# Patient Record
Sex: MALE | Race: WHITE | NOT HISPANIC OR LATINO | Employment: FULL TIME | URBAN - METROPOLITAN AREA
[De-identification: names, ages, dates, MRNs, and addresses within clinical notes are randomized per-mention and may not be internally consistent; named-entity substitution may affect disease eponyms.]

---

## 2017-06-14 ENCOUNTER — GENERIC CONVERSION - ENCOUNTER (OUTPATIENT)
Dept: OTHER | Facility: OTHER | Age: 57
End: 2017-06-14

## 2017-06-14 ENCOUNTER — TRANSCRIBE ORDERS (OUTPATIENT)
Dept: ADMINISTRATIVE | Facility: HOSPITAL | Age: 57
End: 2017-06-14

## 2017-06-14 ENCOUNTER — ALLSCRIPTS OFFICE VISIT (OUTPATIENT)
Dept: OTHER | Facility: OTHER | Age: 57
End: 2017-06-14

## 2017-06-14 DIAGNOSIS — E78.5 HYPERLIPIDEMIA: ICD-10-CM

## 2017-06-14 DIAGNOSIS — I31.9 DISEASE OF PERICARDIUM: ICD-10-CM

## 2017-06-14 DIAGNOSIS — I35.0 NODULAR CALCIFIC AORTIC VALVE STENOSIS: Primary | ICD-10-CM

## 2017-06-14 DIAGNOSIS — I35.0 NONRHEUMATIC AORTIC VALVE STENOSIS: ICD-10-CM

## 2017-06-14 DIAGNOSIS — I44.7 LEFT BUNDLE-BRANCH BLOCK: ICD-10-CM

## 2017-06-14 DIAGNOSIS — Z95.3 PRESENCE OF XENOGENIC HEART VALVE: ICD-10-CM

## 2017-06-14 DIAGNOSIS — I10 ESSENTIAL (PRIMARY) HYPERTENSION: ICD-10-CM

## 2017-06-21 ENCOUNTER — HOSPITAL ENCOUNTER (OUTPATIENT)
Dept: NON INVASIVE DIAGNOSTICS | Facility: HOSPITAL | Age: 57
Discharge: HOME | End: 2017-06-21
Attending: INTERNAL MEDICINE
Payer: COMMERCIAL

## 2017-06-21 DIAGNOSIS — I35.0 NODULAR CALCIFIC AORTIC VALVE STENOSIS: ICD-10-CM

## 2017-06-21 PROCEDURE — 93306 TTE W/DOPPLER COMPLETE: CPT

## 2017-06-27 ENCOUNTER — TRANSCRIBE ORDERS (OUTPATIENT)
Dept: LAB | Facility: MEDICAL CENTER | Age: 57
End: 2017-06-27

## 2017-06-27 ENCOUNTER — APPOINTMENT (OUTPATIENT)
Dept: LAB | Facility: MEDICAL CENTER | Age: 57
End: 2017-06-27
Payer: COMMERCIAL

## 2017-06-27 DIAGNOSIS — I35.0 NODULAR CALCIFIC AORTIC VALVE STENOSIS: ICD-10-CM

## 2017-06-27 DIAGNOSIS — I35.0 NODULAR CALCIFIC AORTIC VALVE STENOSIS: Primary | ICD-10-CM

## 2017-06-27 DIAGNOSIS — E78.5 OTHER AND UNSPECIFIED HYPERLIPIDEMIA: ICD-10-CM

## 2017-06-27 DIAGNOSIS — I44.7 OTHER LEFT BUNDLE BRANCH BLOCK: ICD-10-CM

## 2017-06-27 DIAGNOSIS — Z95.3 STATUS POST AORTIC VALVE REPLACEMENT WITH TISSUE: ICD-10-CM

## 2017-06-27 DIAGNOSIS — I31.9 UNSPECIFIED DISEASE OF PERICARDIUM: ICD-10-CM

## 2017-06-27 DIAGNOSIS — I10 UNSPECIFIED ESSENTIAL HYPERTENSION: ICD-10-CM

## 2017-06-27 PROCEDURE — 85027 COMPLETE CBC AUTOMATED: CPT | Performed by: INTERNAL MEDICINE

## 2017-06-27 PROCEDURE — 36415 COLL VENOUS BLD VENIPUNCTURE: CPT | Performed by: INTERNAL MEDICINE

## 2017-06-27 PROCEDURE — 80061 LIPID PANEL: CPT | Performed by: INTERNAL MEDICINE

## 2017-06-27 PROCEDURE — 80053 COMPREHEN METABOLIC PANEL: CPT | Performed by: INTERNAL MEDICINE

## 2017-07-05 ENCOUNTER — GENERIC CONVERSION - ENCOUNTER (OUTPATIENT)
Dept: OTHER | Facility: OTHER | Age: 57
End: 2017-07-05

## 2017-07-17 ENCOUNTER — GENERIC CONVERSION - ENCOUNTER (OUTPATIENT)
Dept: OTHER | Facility: OTHER | Age: 57
End: 2017-07-17

## 2017-07-24 ENCOUNTER — ALLSCRIPTS OFFICE VISIT (OUTPATIENT)
Dept: OTHER | Facility: OTHER | Age: 57
End: 2017-07-24

## 2017-09-14 ENCOUNTER — ALLSCRIPTS OFFICE VISIT (OUTPATIENT)
Dept: OTHER | Facility: OTHER | Age: 57
End: 2017-09-14

## 2017-12-20 ENCOUNTER — GENERIC CONVERSION - ENCOUNTER (OUTPATIENT)
Dept: OTHER | Facility: OTHER | Age: 57
End: 2017-12-20

## 2017-12-20 ENCOUNTER — ALLSCRIPTS OFFICE VISIT (OUTPATIENT)
Dept: OTHER | Facility: OTHER | Age: 57
End: 2017-12-20

## 2018-01-12 VITALS
HEART RATE: 83 BPM | WEIGHT: 309 LBS | BODY MASS INDEX: 40.95 KG/M2 | DIASTOLIC BLOOD PRESSURE: 71 MMHG | SYSTOLIC BLOOD PRESSURE: 133 MMHG | HEIGHT: 73 IN

## 2018-01-14 VITALS
WEIGHT: 307 LBS | SYSTOLIC BLOOD PRESSURE: 142 MMHG | BODY MASS INDEX: 40.69 KG/M2 | HEIGHT: 73 IN | DIASTOLIC BLOOD PRESSURE: 80 MMHG

## 2018-01-15 VITALS
SYSTOLIC BLOOD PRESSURE: 130 MMHG | WEIGHT: 218.38 LBS | HEIGHT: 73 IN | DIASTOLIC BLOOD PRESSURE: 68 MMHG | BODY MASS INDEX: 28.94 KG/M2

## 2018-01-22 VITALS
SYSTOLIC BLOOD PRESSURE: 158 MMHG | HEIGHT: 73 IN | BODY MASS INDEX: 41.75 KG/M2 | WEIGHT: 315 LBS | DIASTOLIC BLOOD PRESSURE: 78 MMHG | HEART RATE: 85 BPM

## 2018-02-14 DIAGNOSIS — I10 HYPERTENSION, UNSPECIFIED TYPE: Primary | ICD-10-CM

## 2018-02-14 DIAGNOSIS — E87.6 HYPOKALEMIA: ICD-10-CM

## 2018-02-14 DIAGNOSIS — E78.5 HYPERLIPIDEMIA, UNSPECIFIED HYPERLIPIDEMIA TYPE: ICD-10-CM

## 2018-02-14 RX ORDER — TORSEMIDE 20 MG/1
1 TABLET ORAL DAILY
COMMUNITY
End: 2018-02-14 | Stop reason: SDUPTHER

## 2018-02-14 RX ORDER — TORSEMIDE 20 MG/1
20 TABLET ORAL DAILY
Qty: 30 TABLET | Refills: 5 | Status: SHIPPED | OUTPATIENT
Start: 2018-02-14 | End: 2018-06-21 | Stop reason: DRUGHIGH

## 2018-02-14 RX ORDER — POTASSIUM CHLORIDE 20 MEQ/1
20 TABLET, EXTENDED RELEASE ORAL DAILY
Qty: 30 TABLET | Refills: 5 | Status: SHIPPED | OUTPATIENT
Start: 2018-02-14 | End: 2018-11-16 | Stop reason: SDUPTHER

## 2018-02-14 RX ORDER — AMLODIPINE BESYLATE 5 MG/1
5 TABLET ORAL DAILY
Refills: 11 | COMMUNITY
Start: 2018-02-01 | End: 2018-02-14 | Stop reason: SDUPTHER

## 2018-02-14 RX ORDER — METOPROLOL TARTRATE 100 MG/1
1 TABLET ORAL 2 TIMES DAILY
COMMUNITY
Start: 2014-08-04 | End: 2018-02-14 | Stop reason: SDUPTHER

## 2018-02-14 RX ORDER — METOPROLOL TARTRATE 100 MG/1
100 TABLET ORAL 2 TIMES DAILY
Qty: 60 TABLET | Refills: 5 | Status: SHIPPED | OUTPATIENT
Start: 2018-02-14 | End: 2018-10-09 | Stop reason: SDUPTHER

## 2018-02-14 RX ORDER — PRAVASTATIN SODIUM 40 MG
40 TABLET ORAL DAILY
Refills: 6 | COMMUNITY
Start: 2018-02-01 | End: 2018-02-14 | Stop reason: SDUPTHER

## 2018-02-14 RX ORDER — AMLODIPINE BESYLATE 5 MG/1
5 TABLET ORAL DAILY
Qty: 30 TABLET | Refills: 5 | Status: SHIPPED | OUTPATIENT
Start: 2018-02-14 | End: 2018-11-16 | Stop reason: SDUPTHER

## 2018-02-14 RX ORDER — POTASSIUM CHLORIDE 20 MEQ/1
1 TABLET, EXTENDED RELEASE ORAL DAILY
COMMUNITY
Start: 2014-08-06 | End: 2018-02-14 | Stop reason: SDUPTHER

## 2018-02-14 RX ORDER — PRAVASTATIN SODIUM 40 MG
40 TABLET ORAL DAILY
Qty: 30 TABLET | Refills: 5 | Status: SHIPPED | OUTPATIENT
Start: 2018-02-14 | End: 2018-11-16 | Stop reason: SDUPTHER

## 2018-04-06 RX ORDER — ASCORBIC ACID 500 MG
1 TABLET ORAL 2 TIMES DAILY
COMMUNITY
End: 2019-03-16 | Stop reason: HOSPADM

## 2018-04-06 RX ORDER — ASPIRIN 325 MG
1 TABLET ORAL DAILY
COMMUNITY
Start: 2014-08-04 | End: 2018-11-16 | Stop reason: SDUPTHER

## 2018-04-06 RX ORDER — FLUTICASONE PROPIONATE 50 MCG
1 SPRAY, SUSPENSION (ML) NASAL 2 TIMES DAILY
COMMUNITY
Start: 2015-02-19 | End: 2019-03-16 | Stop reason: HOSPADM

## 2018-04-06 RX ORDER — LORATADINE 10 MG/1
1 TABLET ORAL
COMMUNITY
End: 2019-03-16 | Stop reason: HOSPADM

## 2018-04-11 ENCOUNTER — OFFICE VISIT (OUTPATIENT)
Dept: CARDIOLOGY CLINIC | Facility: HOSPITAL | Age: 58
End: 2018-04-11
Payer: COMMERCIAL

## 2018-04-11 VITALS
DIASTOLIC BLOOD PRESSURE: 82 MMHG | WEIGHT: 315 LBS | SYSTOLIC BLOOD PRESSURE: 136 MMHG | BODY MASS INDEX: 41.75 KG/M2 | HEART RATE: 72 BPM | HEIGHT: 73 IN

## 2018-04-11 DIAGNOSIS — I10 ESSENTIAL HYPERTENSION: ICD-10-CM

## 2018-04-11 DIAGNOSIS — I35.0 AORTIC STENOSIS, MILD: Primary | ICD-10-CM

## 2018-04-11 DIAGNOSIS — I44.7 LEFT BUNDLE BRANCH BLOCK: ICD-10-CM

## 2018-04-11 DIAGNOSIS — E78.2 MIXED HYPERLIPIDEMIA: ICD-10-CM

## 2018-04-11 PROCEDURE — 99214 OFFICE O/P EST MOD 30 MIN: CPT | Performed by: INTERNAL MEDICINE

## 2018-04-11 NOTE — PROGRESS NOTES
Cardiology Follow Up    Nona Beckman  1960  [id number]  [address] [address]    1  Aortic stenosis, mild  Echo complete with contrast if indicated    CBC and Platelet    Comprehensive metabolic panel    Lipid panel   2  Essential hypertension  CBC and Platelet    Comprehensive metabolic panel    Lipid panel   3  Left bundle branch block     4  Mixed hyperlipidemia  CBC and Platelet    Comprehensive metabolic panel    Lipid panel       Discussion/Summary:  Overall he is doing well from a cardiac standpoint  He is due for a yearly echocardiogram to follow mean gradients across his bioprosthetic AVR  Gradients have been rising slowly over time  Blood pressure is well controlled  He is due for a fasting lipid profile to assess the adequacy of his statin dose  He does have some mild edema on exam I have asked him to take an extra torsemide 1 day a week  Interval History:  Routine follow-up visit  Denies any chest pain, shortness of breath, palpitations, lightheadedness, dizziness, or syncope  Functional capacity is good he is still working 5 days a week at [employer]  Needs yearly echoes to follow elevated mean gradient on bioprosthetic valve  Denies PND orthopnea  He has had some mild lower extremity edema  Dietary intake of salt has been slightly increased  Problem List     Aortic stenosis, mild    Overview Signed 4/11/2018  7:59 AM by Rafa Coley DO     Description: Severe  ECHO DONE 6-2014  SEVERE AS  PEAK GRADIENT-65mmHg  mean gradient was 38mmHg  MARY-0 7cm2  mild AI           Essential hypertension    Hyperlipidemia    Left bundle branch block        Past Medical History:   Diagnosis Date    Hyperlipidemia     Hypertension      Social History     Social History    Marital status: /Civil Union     Spouse name: N/A    Number of children: N/A    Years of education: N/A     Occupational History    Not on file  Social History Main Topics    Smoking status: Never Smoker    Smokeless tobacco: Never Used    Alcohol use Yes      Comment: ocassion    Drug use: No    Sexual activity: Not on file     Other Topics Concern    Not on file     Social History Narrative    No narrative on file      No family history on file  No past surgical history on file      Current Outpatient Prescriptions:     amLODIPine (NORVASC) 5 mg tablet, Take 1 tablet (5 mg total) by mouth daily, Disp: 30 tablet, Rfl: 5    ascorbic acid (VITAMIN C) 500 MG tablet, Take 1 tablet by mouth 2 (two) times a day, Disp: , Rfl:     aspirin 325 mg tablet, Take 1 tablet by mouth daily, Disp: , Rfl:     metoprolol tartrate (LOPRESSOR) 100 mg tablet, Take 1 tablet (100 mg total) by mouth 2 (two) times a day, Disp: 60 tablet, Rfl: 5    potassium chloride (K-DUR,KLOR-CON) 20 mEq tablet, Take 1 tablet (20 mEq total) by mouth daily, Disp: 30 tablet, Rfl: 5    pravastatin (PRAVACHOL) 40 mg tablet, Take 1 tablet (40 mg total) by mouth daily, Disp: 30 tablet, Rfl: 5    torsemide (DEMADEX) 20 mg tablet, Take 1 tablet (20 mg total) by mouth daily, Disp: 30 tablet, Rfl: 5    fluticasone (FLONASE) 50 mcg/act nasal spray, 1 spray into each nostril 2 (two) times a day, Disp: , Rfl:     loratadine (CLARITIN) 10 mg tablet, Take 1 tablet by mouth, Disp: , Rfl:   Allergies   Allergen Reactions    Penicillins Rash       Labs:     Chemistry        Component Value Date/Time     06/27/2017 0920     04/09/2015 1141    K 4 6 06/27/2017 0920    K 4 3 04/09/2015 1141     06/27/2017 0920     04/09/2015 1141    CO2 29 06/27/2017 0920    CO2 27 3 04/09/2015 1141    BUN 16 06/27/2017 0920    BUN 17 04/09/2015 1141    CREATININE 0 69 06/27/2017 0920    CREATININE 0 67 04/09/2015 1141        Component Value Date/Time    CALCIUM 8 8 06/27/2017 0920    CALCIUM 8 4 04/09/2015 1141    ALKPHOS 86 06/27/2017 0920    ALKPHOS 104 10/30/2014 1600    AST 26 06/27/2017 0920    AST 15 10/30/2014 1600    ALT 50 06/27/2017 0920    ALT 22 10/30/2014 1600    BILITOT 0 46 06/27/2017 0920    BILITOT 0 4 10/30/2014 1600            Lab Results   Component Value Date    CHOL 195 06/27/2017    CHOL 146 07/18/2014    CHOL 145 02/21/2014     Lab Results   Component Value Date    HDL 52 06/27/2017    HDL 51 07/18/2014    HDL 42 02/21/2014     Lab Results   Component Value Date    LDLCALC 129 (H) 06/27/2017    LDLCALC 81 07/18/2014    LDLCALC 85 02/21/2014     Lab Results   Component Value Date    TRIG 71 06/27/2017    TRIG 70 07/18/2014    TRIG 91 02/21/2014     No components found for: CHOLHDL    Imaging: No results found  ECG:        Review of Systems   Constitution: Negative  HENT: Negative  Eyes: Negative  Cardiovascular: Positive for leg swelling  Respiratory: Negative  Endocrine: Negative  Hematologic/Lymphatic: Negative  Skin: Negative  Musculoskeletal: Negative  Gastrointestinal: Negative  Genitourinary: Negative  Neurological: Negative  Psychiatric/Behavioral: Negative  Vitals:    04/11/18 1328   BP: 136/82   Pulse: 72     Vitals:    04/11/18 1328   Weight: (!) 155 kg (341 lb)     Height: 6' 1" (185 4 cm)   Body mass index is 44 99 kg/m²  Physical Exam:  Vital signs reviewed    General appearance:  Appears stated age, alert, well appearing and in no distress  HEENT:  PERRLA, EOMI, no scleral icterus, no conjunctival pallor  NECK:  Supple, No elevated JVP, no thyromegaly, no carotid bruits  HEART:  Regular rate and rhythm, normal S1/S2, no S3/S4, no murmur or rub  LUNGS:  Clear to auscultation bilaterally, no wheezes rales or rhonchi  ABDOMEN:  Soft, non-tender, positive bowel sounds, no rebound or guarding, no organomegaly   EXTREMITIES:  +1 edema, normal range of motion  VASCULAR:  Normal pedal pulses, good pulse volume   SKIN: No lesions or rashes on exposed skin  NEURO:  CN II-XII intact, no focal deficits

## 2018-05-02 ENCOUNTER — CLINICAL SUPPORT (OUTPATIENT)
Dept: FAMILY MEDICINE CLINIC | Facility: CLINIC | Age: 58
End: 2018-05-02
Payer: COMMERCIAL

## 2018-05-02 DIAGNOSIS — Z23 NEED FOR IMMUNIZATION AGAINST TYPHOID: Primary | ICD-10-CM

## 2018-05-02 PROCEDURE — 90471 IMMUNIZATION ADMIN: CPT | Performed by: FAMILY MEDICINE

## 2018-05-02 PROCEDURE — 90691 TYPHOID VACCINE IM: CPT

## 2018-06-02 ENCOUNTER — TRANSCRIBE ORDERS (OUTPATIENT)
Dept: LAB | Facility: MEDICAL CENTER | Age: 58
End: 2018-06-02

## 2018-06-02 ENCOUNTER — APPOINTMENT (OUTPATIENT)
Dept: LAB | Facility: MEDICAL CENTER | Age: 58
End: 2018-06-02
Payer: COMMERCIAL

## 2018-06-02 DIAGNOSIS — E78.5 HYPERLIPIDEMIA, UNSPECIFIED HYPERLIPIDEMIA TYPE: ICD-10-CM

## 2018-06-02 DIAGNOSIS — E78.2 MIXED HYPERLIPIDEMIA: ICD-10-CM

## 2018-06-02 DIAGNOSIS — E78.5 HYPERLIPIDEMIA, UNSPECIFIED HYPERLIPIDEMIA TYPE: Primary | ICD-10-CM

## 2018-06-02 LAB
ALBUMIN SERPL BCP-MCNC: 3.6 G/DL (ref 3.5–5)
ALP SERPL-CCNC: 90 U/L (ref 46–116)
ALT SERPL W P-5'-P-CCNC: 25 U/L (ref 12–78)
ANION GAP SERPL CALCULATED.3IONS-SCNC: 6 MMOL/L (ref 4–13)
AST SERPL W P-5'-P-CCNC: 12 U/L (ref 5–45)
BASOPHILS # BLD AUTO: 0.08 THOUSANDS/ΜL (ref 0–0.1)
BASOPHILS NFR BLD AUTO: 2 % (ref 0–1)
BILIRUB SERPL-MCNC: 0.41 MG/DL (ref 0.2–1)
BUN SERPL-MCNC: 20 MG/DL (ref 5–25)
CALCIUM SERPL-MCNC: 8.4 MG/DL (ref 8.3–10.1)
CHLORIDE SERPL-SCNC: 105 MMOL/L (ref 100–108)
CHOLEST SERPL-MCNC: 118 MG/DL (ref 50–200)
CO2 SERPL-SCNC: 27 MMOL/L (ref 21–32)
CREAT SERPL-MCNC: 0.75 MG/DL (ref 0.6–1.3)
EOSINOPHIL # BLD AUTO: 0.15 THOUSAND/ΜL (ref 0–0.61)
EOSINOPHIL NFR BLD AUTO: 3 % (ref 0–6)
ERYTHROCYTE [DISTWIDTH] IN BLOOD BY AUTOMATED COUNT: 16.3 % (ref 11.6–15.1)
GFR SERPL CREATININE-BSD FRML MDRD: 100 ML/MIN/1.73SQ M
GLUCOSE P FAST SERPL-MCNC: 102 MG/DL (ref 65–99)
HCT VFR BLD AUTO: 42.5 % (ref 36.5–49.3)
HDLC SERPL-MCNC: 41 MG/DL (ref 40–60)
HGB BLD-MCNC: 12.9 G/DL (ref 12–17)
IMM GRANULOCYTES # BLD AUTO: 0.03 THOUSAND/UL (ref 0–0.2)
IMM GRANULOCYTES NFR BLD AUTO: 1 % (ref 0–2)
LDLC SERPL CALC-MCNC: 57 MG/DL (ref 0–100)
LYMPHOCYTES # BLD AUTO: 1.05 THOUSANDS/ΜL (ref 0.6–4.47)
LYMPHOCYTES NFR BLD AUTO: 20 % (ref 14–44)
MCH RBC QN AUTO: 25.7 PG (ref 26.8–34.3)
MCHC RBC AUTO-ENTMCNC: 30.4 G/DL (ref 31.4–37.4)
MCV RBC AUTO: 85 FL (ref 82–98)
MONOCYTES # BLD AUTO: 0.54 THOUSAND/ΜL (ref 0.17–1.22)
MONOCYTES NFR BLD AUTO: 10 % (ref 4–12)
NEUTROPHILS # BLD AUTO: 3.39 THOUSANDS/ΜL (ref 1.85–7.62)
NEUTS SEG NFR BLD AUTO: 64 % (ref 43–75)
NONHDLC SERPL-MCNC: 77 MG/DL
NRBC BLD AUTO-RTO: 0 /100 WBCS
PLATELET # BLD AUTO: 187 THOUSANDS/UL (ref 149–390)
PMV BLD AUTO: 11.9 FL (ref 8.9–12.7)
POTASSIUM SERPL-SCNC: 4.2 MMOL/L (ref 3.5–5.3)
PROT SERPL-MCNC: 7 G/DL (ref 6.4–8.2)
RBC # BLD AUTO: 5.01 MILLION/UL (ref 3.88–5.62)
SODIUM SERPL-SCNC: 138 MMOL/L (ref 136–145)
TRIGL SERPL-MCNC: 102 MG/DL
WBC # BLD AUTO: 5.24 THOUSAND/UL (ref 4.31–10.16)

## 2018-06-02 PROCEDURE — 80061 LIPID PANEL: CPT

## 2018-06-02 PROCEDURE — 85025 COMPLETE CBC W/AUTO DIFF WBC: CPT

## 2018-06-02 PROCEDURE — 80053 COMPREHEN METABOLIC PANEL: CPT

## 2018-06-02 PROCEDURE — 36415 COLL VENOUS BLD VENIPUNCTURE: CPT

## 2018-06-07 ENCOUNTER — HOSPITAL ENCOUNTER (OUTPATIENT)
Dept: NON INVASIVE DIAGNOSTICS | Facility: HOSPITAL | Age: 58
Discharge: HOME | End: 2018-06-07
Attending: INTERNAL MEDICINE
Payer: COMMERCIAL

## 2018-06-07 DIAGNOSIS — I35.0 AORTIC STENOSIS, MILD: ICD-10-CM

## 2018-06-07 PROCEDURE — 93306 TTE W/DOPPLER COMPLETE: CPT

## 2018-06-07 PROCEDURE — 93306 TTE W/DOPPLER COMPLETE: CPT | Performed by: INTERNAL MEDICINE

## 2018-06-11 ENCOUNTER — OFFICE VISIT (OUTPATIENT)
Dept: FAMILY MEDICINE CLINIC | Facility: CLINIC | Age: 58
End: 2018-06-11
Payer: COMMERCIAL

## 2018-06-11 ENCOUNTER — TELEPHONE (OUTPATIENT)
Dept: FAMILY MEDICINE CLINIC | Facility: CLINIC | Age: 58
End: 2018-06-11

## 2018-06-11 ENCOUNTER — HOSPITAL ENCOUNTER (OUTPATIENT)
Dept: ULTRASOUND IMAGING | Facility: HOSPITAL | Age: 58
Discharge: HOME | End: 2018-06-11
Payer: COMMERCIAL

## 2018-06-11 VITALS
SYSTOLIC BLOOD PRESSURE: 140 MMHG | WEIGHT: 315 LBS | DIASTOLIC BLOOD PRESSURE: 80 MMHG | BODY MASS INDEX: 41.75 KG/M2 | HEIGHT: 73 IN

## 2018-06-11 DIAGNOSIS — I87.2 VENOUS INSUFFICIENCY: ICD-10-CM

## 2018-06-11 DIAGNOSIS — M79.604 BILATERAL LEG PAIN: ICD-10-CM

## 2018-06-11 DIAGNOSIS — R60.0 BILATERAL LEG EDEMA: ICD-10-CM

## 2018-06-11 DIAGNOSIS — M79.605 BILATERAL LEG PAIN: ICD-10-CM

## 2018-06-11 DIAGNOSIS — R60.0 BILATERAL LEG EDEMA: Primary | ICD-10-CM

## 2018-06-11 PROBLEM — R63.8 INCREASED BMI: Status: ACTIVE | Noted: 2017-09-14

## 2018-06-11 PROBLEM — H25.9 AGE-RELATED CATARACT OF RIGHT EYE: Status: ACTIVE | Noted: 2017-09-14

## 2018-06-11 PROCEDURE — 1036F TOBACCO NON-USER: CPT | Performed by: PHYSICIAN ASSISTANT

## 2018-06-11 PROCEDURE — 93970 EXTREMITY STUDY: CPT

## 2018-06-11 PROCEDURE — 93970 EXTREMITY STUDY: CPT | Performed by: SURGERY

## 2018-06-11 PROCEDURE — 3008F BODY MASS INDEX DOCD: CPT | Performed by: PHYSICIAN ASSISTANT

## 2018-06-11 PROCEDURE — 99214 OFFICE O/P EST MOD 30 MIN: CPT | Performed by: PHYSICIAN ASSISTANT

## 2018-06-11 NOTE — PROGRESS NOTES
Assessment/Plan:    Will send Michel for stat bilateral venous dopplers today at his request  If negative, would consider compression hose  Diagnoses and all orders for this visit:    Bilateral leg edema  -     VAS lower limb venous duplex study, complete bilateral; Future    Bilateral leg pain  -     VAS lower limb venous duplex study, complete bilateral; Future    Venous insufficiency  -     VAS lower limb venous duplex study, complete bilateral; Future          Subjective:      Patient ID: Roxie Montaño is a 60 y o  male  Fletcher Wong is here today complaining of pain and swelling in bilateral legs  He is a poor historian  He tells me pain in his ankles started 20+ years ago after a fall  Recently, however, legs have become more swollen and "hard " He is requesting ultrasounds on his legs today as he states he is worried he may have DVTs  He has a history of venous insufficiency and is morbidly obese  The following portions of the patient's history were reviewed and updated as appropriate:   He  has a past medical history of Allergic rhinitis; Finger fracture, right; Hemorrhoids; Hyperlipidemia; and Hypertension  He   Patient Active Problem List    Diagnosis Date Noted    Bilateral leg edema 06/11/2018    Bilateral leg pain 06/11/2018    Increased BMI 09/14/2017    Age-related cataract of right eye 09/14/2017    Hypokalemia 03/04/2016    Pericardial disease 11/03/2014    Left bundle branch block 08/20/2014    Venous insufficiency 08/11/2014    Sciatica 02/10/2014    Osteoarthritis of both knees 01/07/2014    Aortic stenosis, mild 12/17/2013    Murmur 11/26/2013    Essential hypertension 06/28/2012    Hyperlipidemia 06/28/2012     He  has a past surgical history that includes Aortic valve replacement (07/30/2014); Cardiac catheterization (07/18/2014); Knee cartilage surgery (Left); Tooth extraction; Testicle surgery; and Tonsillectomy and adenoidectomy    His family history includes Cancer in his family; Coronary artery disease in his father; Heart attack in his father; Heart disease in his mother; Hypertension in his father; Lung cancer in his mother  He  reports that he has never smoked  He has never used smokeless tobacco  He reports that he drinks alcohol  He reports that he does not use drugs  Current Outpatient Prescriptions   Medication Sig Dispense Refill    amLODIPine (NORVASC) 5 mg tablet Take 1 tablet (5 mg total) by mouth daily 30 tablet 5    ascorbic acid (VITAMIN C) 500 MG tablet Take 1 tablet by mouth 2 (two) times a day      aspirin 325 mg tablet Take 1 tablet by mouth daily      fluticasone (FLONASE) 50 mcg/act nasal spray 1 spray into each nostril 2 (two) times a day      loratadine (CLARITIN) 10 mg tablet Take 1 tablet by mouth      metoprolol tartrate (LOPRESSOR) 100 mg tablet Take 1 tablet (100 mg total) by mouth 2 (two) times a day 60 tablet 5    potassium chloride (K-DUR,KLOR-CON) 20 mEq tablet Take 1 tablet (20 mEq total) by mouth daily 30 tablet 5    pravastatin (PRAVACHOL) 40 mg tablet Take 1 tablet (40 mg total) by mouth daily 30 tablet 5    torsemide (DEMADEX) 20 mg tablet Take 1 tablet (20 mg total) by mouth daily 30 tablet 5     No current facility-administered medications for this visit        Current Outpatient Prescriptions on File Prior to Visit   Medication Sig    amLODIPine (NORVASC) 5 mg tablet Take 1 tablet (5 mg total) by mouth daily    ascorbic acid (VITAMIN C) 500 MG tablet Take 1 tablet by mouth 2 (two) times a day    aspirin 325 mg tablet Take 1 tablet by mouth daily    fluticasone (FLONASE) 50 mcg/act nasal spray 1 spray into each nostril 2 (two) times a day    loratadine (CLARITIN) 10 mg tablet Take 1 tablet by mouth    metoprolol tartrate (LOPRESSOR) 100 mg tablet Take 1 tablet (100 mg total) by mouth 2 (two) times a day    potassium chloride (K-DUR,KLOR-CON) 20 mEq tablet Take 1 tablet (20 mEq total) by mouth daily    pravastatin (PRAVACHOL) 40 mg tablet Take 1 tablet (40 mg total) by mouth daily    torsemide (DEMADEX) 20 mg tablet Take 1 tablet (20 mg total) by mouth daily     No current facility-administered medications on file prior to visit  He is allergic to penicillins       Review of Systems   Constitutional: Negative for chills and fever  HENT: Negative for congestion, rhinorrhea, sinus pain, sinus pressure and sore throat  Respiratory: Positive for shortness of breath (baseline)  Negative for cough, chest tightness and wheezing  Cardiovascular: Positive for leg swelling  Negative for chest pain and palpitations  Gastrointestinal: Negative for abdominal pain, constipation, diarrhea, nausea and vomiting  Musculoskeletal: Positive for gait problem  Skin: Negative for rash  Objective:      /80 (BP Location: Left arm, Patient Position: Sitting)   Ht 6' 1" (1 854 m)   Wt (!) 155 kg (341 lb)   BMI 44 99 kg/m²          Physical Exam   Constitutional: He is oriented to person, place, and time  He appears well-developed and well-nourished  No distress  HENT:   Head: Normocephalic and atraumatic  Right Ear: Hearing, tympanic membrane, external ear and ear canal normal    Left Ear: Hearing, tympanic membrane, external ear and ear canal normal    Mouth/Throat: Uvula is midline, oropharynx is clear and moist and mucous membranes are normal    Eyes: Right eye exhibits no discharge  Left eye exhibits no discharge  No scleral icterus  Cardiovascular: Normal rate, regular rhythm and normal heart sounds  Pulmonary/Chest: Effort normal and breath sounds normal    Abdominal:   Morbidly obese   Musculoskeletal: He exhibits edema  biltateral LE ++edema, pain, skin pink, no increased warmth  Bilateral LE are TTP, negative Lars's test bilaterally  Neurological: He is alert and oriented to person, place, and time  Skin: Skin is warm and dry  He is not diaphoretic  No erythema     Psychiatric: He has a normal mood and affect  His behavior is normal  Judgment and thought content normal    Vitals reviewed

## 2018-06-21 ENCOUNTER — TRANSCRIBE ORDERS (OUTPATIENT)
Dept: LAB | Facility: MEDICAL CENTER | Age: 58
End: 2018-06-21

## 2018-06-21 ENCOUNTER — APPOINTMENT (OUTPATIENT)
Dept: LAB | Facility: MEDICAL CENTER | Age: 58
End: 2018-06-21
Payer: COMMERCIAL

## 2018-06-21 DIAGNOSIS — R60.0 LOCALIZED EDEMA: Primary | ICD-10-CM

## 2018-06-21 DIAGNOSIS — E87.6 HYPOKALEMIA: ICD-10-CM

## 2018-06-21 DIAGNOSIS — E87.6 HYPOKALEMIA: Primary | ICD-10-CM

## 2018-06-21 LAB
ANION GAP SERPL CALCULATED.3IONS-SCNC: 6 MMOL/L (ref 4–13)
BUN SERPL-MCNC: 25 MG/DL (ref 5–25)
CALCIUM SERPL-MCNC: 8.7 MG/DL (ref 8.3–10.1)
CHLORIDE SERPL-SCNC: 104 MMOL/L (ref 100–108)
CO2 SERPL-SCNC: 29 MMOL/L (ref 21–32)
CREAT SERPL-MCNC: 0.98 MG/DL (ref 0.6–1.3)
GFR SERPL CREATININE-BSD FRML MDRD: 84 ML/MIN/1.73SQ M
GLUCOSE SERPL-MCNC: 84 MG/DL (ref 65–140)
POTASSIUM SERPL-SCNC: 4 MMOL/L (ref 3.5–5.3)
SODIUM SERPL-SCNC: 139 MMOL/L (ref 136–145)

## 2018-06-21 PROCEDURE — 80048 BASIC METABOLIC PNL TOTAL CA: CPT

## 2018-06-21 PROCEDURE — 36415 COLL VENOUS BLD VENIPUNCTURE: CPT

## 2018-06-22 RX ORDER — TORSEMIDE 20 MG/1
20 TABLET ORAL DAILY
Qty: 30 TABLET | Refills: 3 | Status: SHIPPED | OUTPATIENT
Start: 2018-06-22 | End: 2018-09-09 | Stop reason: SDUPTHER

## 2018-09-09 DIAGNOSIS — R60.0 LOCALIZED EDEMA: ICD-10-CM

## 2018-09-10 RX ORDER — TORSEMIDE 20 MG/1
TABLET ORAL
Qty: 90 TABLET | Refills: 2 | Status: SHIPPED | OUTPATIENT
Start: 2018-09-10 | End: 2018-11-16 | Stop reason: SDUPTHER

## 2018-10-09 DIAGNOSIS — I10 HYPERTENSION, UNSPECIFIED TYPE: ICD-10-CM

## 2018-10-09 RX ORDER — METOPROLOL TARTRATE 100 MG/1
100 TABLET ORAL 2 TIMES DAILY
Qty: 60 TABLET | Refills: 5 | Status: SHIPPED | OUTPATIENT
Start: 2018-10-09 | End: 2018-11-16 | Stop reason: SDUPTHER

## 2018-11-16 DIAGNOSIS — E87.6 HYPOKALEMIA: ICD-10-CM

## 2018-11-16 DIAGNOSIS — I10 HYPERTENSION, UNSPECIFIED TYPE: ICD-10-CM

## 2018-11-16 DIAGNOSIS — R60.0 LOCALIZED EDEMA: ICD-10-CM

## 2018-11-16 DIAGNOSIS — E78.5 HYPERLIPIDEMIA, UNSPECIFIED HYPERLIPIDEMIA TYPE: ICD-10-CM

## 2018-11-16 DIAGNOSIS — I35.0 AORTIC VALVE STENOSIS, ETIOLOGY OF CARDIAC VALVE DISEASE UNSPECIFIED: Primary | ICD-10-CM

## 2018-11-16 RX ORDER — TORSEMIDE 20 MG/1
20 TABLET ORAL DAILY
Qty: 30 TABLET | Refills: 11 | Status: SHIPPED | OUTPATIENT
Start: 2018-11-16 | End: 2019-03-16 | Stop reason: HOSPADM

## 2018-11-16 RX ORDER — METOPROLOL TARTRATE 100 MG/1
100 TABLET ORAL 2 TIMES DAILY
Qty: 60 TABLET | Refills: 11 | Status: SHIPPED | OUTPATIENT
Start: 2018-11-16 | End: 2019-03-16 | Stop reason: HOSPADM

## 2018-11-16 RX ORDER — PRAVASTATIN SODIUM 40 MG
40 TABLET ORAL
Qty: 30 TABLET | Refills: 11 | Status: SHIPPED | OUTPATIENT
Start: 2018-11-16 | End: 2019-03-16 | Stop reason: HOSPADM

## 2018-11-16 RX ORDER — ASPIRIN 325 MG
325 TABLET, DELAYED RELEASE (ENTERIC COATED) ORAL DAILY
Qty: 30 TABLET | Refills: 11 | Status: SHIPPED | OUTPATIENT
Start: 2018-11-16 | End: 2019-03-16 | Stop reason: HOSPADM

## 2018-11-16 RX ORDER — POTASSIUM CHLORIDE 20 MEQ/1
20 TABLET, EXTENDED RELEASE ORAL DAILY
Qty: 30 TABLET | Refills: 11 | Status: SHIPPED | OUTPATIENT
Start: 2018-11-16 | End: 2019-03-16 | Stop reason: HOSPADM

## 2018-11-16 RX ORDER — AMLODIPINE BESYLATE 5 MG/1
5 TABLET ORAL DAILY
Qty: 30 TABLET | Refills: 11 | Status: SHIPPED | OUTPATIENT
Start: 2018-11-16 | End: 2019-03-16 | Stop reason: HOSPADM

## 2019-01-23 ENCOUNTER — HOSPITAL ENCOUNTER (INPATIENT)
Facility: HOSPITAL | Age: 59
LOS: 1 days | DRG: 871 | End: 2019-01-24
Attending: EMERGENCY MEDICINE | Admitting: INTERNAL MEDICINE
Payer: COMMERCIAL

## 2019-01-23 ENCOUNTER — APPOINTMENT (EMERGENCY)
Dept: CT IMAGING | Facility: HOSPITAL | Age: 59
DRG: 871 | End: 2019-01-23
Payer: COMMERCIAL

## 2019-01-23 ENCOUNTER — APPOINTMENT (INPATIENT)
Dept: RADIOLOGY | Facility: HOSPITAL | Age: 59
DRG: 871 | End: 2019-01-23
Payer: COMMERCIAL

## 2019-01-23 ENCOUNTER — APPOINTMENT (INPATIENT)
Dept: CT IMAGING | Facility: HOSPITAL | Age: 59
DRG: 871 | End: 2019-01-23
Payer: COMMERCIAL

## 2019-01-23 ENCOUNTER — APPOINTMENT (EMERGENCY)
Dept: RADIOLOGY | Facility: HOSPITAL | Age: 59
DRG: 871 | End: 2019-01-23
Payer: COMMERCIAL

## 2019-01-23 DIAGNOSIS — R93.0 ABNORMAL HEAD CT: ICD-10-CM

## 2019-01-23 DIAGNOSIS — R77.8 ELEVATED TROPONIN: ICD-10-CM

## 2019-01-23 DIAGNOSIS — A41.9 SEVERE SEPSIS (HCC): Primary | ICD-10-CM

## 2019-01-23 DIAGNOSIS — R65.20 SEVERE SEPSIS (HCC): Primary | ICD-10-CM

## 2019-01-23 DIAGNOSIS — I21.4 NSTEMI (NON-ST ELEVATION MYOCARDIAL INFARCTION) (HCC): ICD-10-CM

## 2019-01-23 DIAGNOSIS — R79.89 ELEVATED BRAIN NATRIURETIC PEPTIDE (BNP) LEVEL: ICD-10-CM

## 2019-01-23 DIAGNOSIS — I47.1 SVT (SUPRAVENTRICULAR TACHYCARDIA) (HCC): ICD-10-CM

## 2019-01-23 DIAGNOSIS — G93.40 ENCEPHALOPATHY ACUTE: ICD-10-CM

## 2019-01-23 DIAGNOSIS — J96.90 RESPIRATORY FAILURE (HCC): ICD-10-CM

## 2019-01-23 DIAGNOSIS — N17.9 AKI (ACUTE KIDNEY INJURY) (HCC): ICD-10-CM

## 2019-01-23 DIAGNOSIS — R74.01 TRANSAMINITIS: ICD-10-CM

## 2019-01-23 DIAGNOSIS — N39.0 UTI (URINARY TRACT INFECTION): ICD-10-CM

## 2019-01-23 PROBLEM — R06.03 RESPIRATORY DISTRESS: Status: ACTIVE | Noted: 2019-01-23

## 2019-01-23 LAB
ALBUMIN SERPL BCP-MCNC: 3.3 G/DL (ref 3.5–5)
ALP SERPL-CCNC: 89 U/L (ref 46–116)
ALT SERPL W P-5'-P-CCNC: 90 U/L (ref 12–78)
AMMONIA PLAS-SCNC: 17 UMOL/L (ref 11–35)
ANION GAP SERPL CALCULATED.3IONS-SCNC: 13 MMOL/L (ref 4–13)
ANION GAP SERPL CALCULATED.3IONS-SCNC: 14 MMOL/L (ref 4–13)
ANION GAP SERPL CALCULATED.3IONS-SCNC: 17 MMOL/L (ref 4–13)
APTT PPP: 34 SECONDS (ref 26–38)
ARTERIAL PATENCY WRIST A: YES
ARTERIAL PATENCY WRIST A: YES
AST SERPL W P-5'-P-CCNC: 242 U/L (ref 5–45)
BACTERIA UR QL AUTO: ABNORMAL /HPF
BASE EXCESS BLDA CALC-SCNC: -2.1 MMOL/L
BASE EXCESS BLDA CALC-SCNC: -2.8 MMOL/L
BASOPHILS # BLD AUTO: 0.03 THOUSANDS/ΜL (ref 0–0.1)
BASOPHILS NFR BLD AUTO: 0 % (ref 0–1)
BILIRUB SERPL-MCNC: 1.4 MG/DL (ref 0.2–1)
BILIRUB UR QL STRIP: ABNORMAL
BUN SERPL-MCNC: 34 MG/DL (ref 5–25)
BUN SERPL-MCNC: 36 MG/DL (ref 5–25)
BUN SERPL-MCNC: 41 MG/DL (ref 5–25)
CALCIUM SERPL-MCNC: 7.4 MG/DL (ref 8.3–10.1)
CALCIUM SERPL-MCNC: 8 MG/DL (ref 8.3–10.1)
CALCIUM SERPL-MCNC: 8.2 MG/DL (ref 8.3–10.1)
CHLORIDE SERPL-SCNC: 102 MMOL/L (ref 100–108)
CHLORIDE SERPL-SCNC: 103 MMOL/L (ref 100–108)
CHLORIDE SERPL-SCNC: 99 MMOL/L (ref 100–108)
CLARITY UR: ABNORMAL
CO2 SERPL-SCNC: 21 MMOL/L (ref 21–32)
CO2 SERPL-SCNC: 25 MMOL/L (ref 21–32)
CO2 SERPL-SCNC: 25 MMOL/L (ref 21–32)
COLOR UR: ABNORMAL
CREAT SERPL-MCNC: 2.29 MG/DL (ref 0.6–1.3)
CREAT SERPL-MCNC: 2.63 MG/DL (ref 0.6–1.3)
CREAT SERPL-MCNC: 2.87 MG/DL (ref 0.6–1.3)
EOSINOPHIL # BLD AUTO: 0 THOUSAND/ΜL (ref 0–0.61)
EOSINOPHIL NFR BLD AUTO: 0 % (ref 0–6)
ERYTHROCYTE [DISTWIDTH] IN BLOOD BY AUTOMATED COUNT: 15.8 % (ref 11.6–15.1)
ERYTHROCYTE [DISTWIDTH] IN BLOOD BY AUTOMATED COUNT: 16 % (ref 11.6–15.1)
GFR SERPL CREATININE-BSD FRML MDRD: 23 ML/MIN/1.73SQ M
GFR SERPL CREATININE-BSD FRML MDRD: 25 ML/MIN/1.73SQ M
GFR SERPL CREATININE-BSD FRML MDRD: 30 ML/MIN/1.73SQ M
GLUCOSE SERPL-MCNC: 133 MG/DL (ref 65–140)
GLUCOSE SERPL-MCNC: 136 MG/DL (ref 65–140)
GLUCOSE SERPL-MCNC: 146 MG/DL (ref 65–140)
GLUCOSE UR STRIP-MCNC: NEGATIVE MG/DL
HCO3 BLDA-SCNC: 21.3 MMOL/L (ref 22–28)
HCO3 BLDA-SCNC: 21.9 MMOL/L (ref 22–28)
HCT VFR BLD AUTO: 46.4 % (ref 36.5–49.3)
HCT VFR BLD AUTO: 47 % (ref 36.5–49.3)
HGB BLD-MCNC: 14.4 G/DL (ref 12–17)
HGB BLD-MCNC: 14.6 G/DL (ref 12–17)
HGB UR QL STRIP.AUTO: ABNORMAL
HOROWITZ INDEX BLDA+IHG-RTO: 100 MM[HG]
IMM GRANULOCYTES # BLD AUTO: 0.19 THOUSAND/UL (ref 0–0.2)
IMM GRANULOCYTES NFR BLD AUTO: 1 % (ref 0–2)
INR PPP: 1.45 (ref 0.86–1.17)
KETONES UR STRIP-MCNC: NEGATIVE MG/DL
LACTATE SERPL-SCNC: 2.2 MMOL/L (ref 0.5–2)
LACTATE SERPL-SCNC: 3.3 MMOL/L (ref 0.5–2)
LACTATE SERPL-SCNC: 5.5 MMOL/L (ref 0.5–2)
LEUKOCYTE ESTERASE UR QL STRIP: NEGATIVE
LYMPHOCYTES # BLD AUTO: 0.25 THOUSANDS/ΜL (ref 0.6–4.47)
LYMPHOCYTES NFR BLD AUTO: 2 % (ref 14–44)
MAGNESIUM SERPL-MCNC: 2 MG/DL (ref 1.6–2.6)
MCH RBC QN AUTO: 26.4 PG (ref 26.8–34.3)
MCH RBC QN AUTO: 26.5 PG (ref 26.8–34.3)
MCHC RBC AUTO-ENTMCNC: 31 G/DL (ref 31.4–37.4)
MCHC RBC AUTO-ENTMCNC: 31.1 G/DL (ref 31.4–37.4)
MCV RBC AUTO: 85 FL (ref 82–98)
MCV RBC AUTO: 85 FL (ref 82–98)
MONOCYTES # BLD AUTO: 0.51 THOUSAND/ΜL (ref 0.17–1.22)
MONOCYTES NFR BLD AUTO: 3 % (ref 4–12)
NEUTROPHILS # BLD AUTO: 15.03 THOUSANDS/ΜL (ref 1.85–7.62)
NEUTS SEG NFR BLD AUTO: 94 % (ref 43–75)
NITRITE UR QL STRIP: POSITIVE
NON VENT TYPE- NRB: 100
NON-SQ EPI CELLS URNS QL MICRO: ABNORMAL /HPF
NRBC BLD AUTO-RTO: 0 /100 WBCS
NT-PROBNP SERPL-MCNC: ABNORMAL PG/ML
O2 CT BLDA-SCNC: 20.2 ML/DL (ref 16–23)
O2 CT BLDA-SCNC: 21.5 ML/DL (ref 16–23)
OXYHGB MFR BLDA: 97.9 % (ref 94–97)
OXYHGB MFR BLDA: 98.7 % (ref 94–97)
PCO2 BLDA: 35.1 MM HG (ref 36–44)
PCO2 BLDA: 35.5 MM HG (ref 36–44)
PEEP RESPIRATORY: 7 CM[H2O]
PH BLDA: 7.4 [PH] (ref 7.35–7.45)
PH BLDA: 7.41 [PH] (ref 7.35–7.45)
PH UR STRIP.AUTO: 5.5 [PH] (ref 4.5–8)
PLATELET # BLD AUTO: 112 THOUSANDS/UL (ref 149–390)
PLATELET # BLD AUTO: 83 THOUSANDS/UL (ref 149–390)
PMV BLD AUTO: 11.4 FL (ref 8.9–12.7)
PMV BLD AUTO: 11.7 FL (ref 8.9–12.7)
PO2 BLDA: 148.2 MM HG (ref 75–129)
PO2 BLDA: 261.7 MM HG (ref 75–129)
POTASSIUM SERPL-SCNC: 3.9 MMOL/L (ref 3.5–5.3)
POTASSIUM SERPL-SCNC: 3.9 MMOL/L (ref 3.5–5.3)
POTASSIUM SERPL-SCNC: 4 MMOL/L (ref 3.5–5.3)
PROT SERPL-MCNC: 7.4 G/DL (ref 6.4–8.2)
PROT UR STRIP-MCNC: >=300 MG/DL
PROTHROMBIN TIME: 16.9 SECONDS (ref 11.8–14.2)
RBC # BLD AUTO: 5.45 MILLION/UL (ref 3.88–5.62)
RBC # BLD AUTO: 5.51 MILLION/UL (ref 3.88–5.62)
RBC #/AREA URNS AUTO: ABNORMAL /HPF
SODIUM SERPL-SCNC: 138 MMOL/L (ref 136–145)
SODIUM SERPL-SCNC: 140 MMOL/L (ref 136–145)
SODIUM SERPL-SCNC: 141 MMOL/L (ref 136–145)
SP GR UR STRIP.AUTO: 1.02 (ref 1–1.03)
SPECIMEN SOURCE: ABNORMAL
SPECIMEN SOURCE: ABNORMAL
TROPONIN I SERPL-MCNC: 14.2 NG/ML
TROPONIN I SERPL-MCNC: 24.29 NG/ML
TROPONIN I SERPL-MCNC: 36.61 NG/ML
UROBILINOGEN UR QL STRIP.AUTO: 2 E.U./DL
VENT AC: 18
VENT- AC: AC
VT SETTING VENT: 500 ML
WBC # BLD AUTO: 12.27 THOUSAND/UL (ref 4.31–10.16)
WBC # BLD AUTO: 16.01 THOUSAND/UL (ref 4.31–10.16)
WBC #/AREA URNS AUTO: ABNORMAL /HPF

## 2019-01-23 PROCEDURE — 99291 CRITICAL CARE FIRST HOUR: CPT

## 2019-01-23 PROCEDURE — 85025 COMPLETE CBC W/AUTO DIFF WBC: CPT | Performed by: PHYSICIAN ASSISTANT

## 2019-01-23 PROCEDURE — 80053 COMPREHEN METABOLIC PANEL: CPT | Performed by: PHYSICIAN ASSISTANT

## 2019-01-23 PROCEDURE — 93005 ELECTROCARDIOGRAM TRACING: CPT

## 2019-01-23 PROCEDURE — 83605 ASSAY OF LACTIC ACID: CPT | Performed by: PHYSICIAN ASSISTANT

## 2019-01-23 PROCEDURE — 87186 SC STD MICRODIL/AGAR DIL: CPT | Performed by: PHYSICIAN ASSISTANT

## 2019-01-23 PROCEDURE — 71045 X-RAY EXAM CHEST 1 VIEW: CPT

## 2019-01-23 PROCEDURE — 84484 ASSAY OF TROPONIN QUANT: CPT | Performed by: PHYSICIAN ASSISTANT

## 2019-01-23 PROCEDURE — 83605 ASSAY OF LACTIC ACID: CPT | Performed by: NURSE PRACTITIONER

## 2019-01-23 PROCEDURE — 96375 TX/PRO/DX INJ NEW DRUG ADDON: CPT

## 2019-01-23 PROCEDURE — 36415 COLL VENOUS BLD VENIPUNCTURE: CPT | Performed by: PHYSICIAN ASSISTANT

## 2019-01-23 PROCEDURE — 71250 CT THORAX DX C-: CPT

## 2019-01-23 PROCEDURE — 5A1935Z RESPIRATORY VENTILATION, LESS THAN 24 CONSECUTIVE HOURS: ICD-10-PCS | Performed by: EMERGENCY MEDICINE

## 2019-01-23 PROCEDURE — 85730 THROMBOPLASTIN TIME PARTIAL: CPT | Performed by: PHYSICIAN ASSISTANT

## 2019-01-23 PROCEDURE — 83735 ASSAY OF MAGNESIUM: CPT | Performed by: PHYSICIAN ASSISTANT

## 2019-01-23 PROCEDURE — 94760 N-INVAS EAR/PLS OXIMETRY 1: CPT

## 2019-01-23 PROCEDURE — 87147 CULTURE TYPE IMMUNOLOGIC: CPT | Performed by: PHYSICIAN ASSISTANT

## 2019-01-23 PROCEDURE — 94660 CPAP INITIATION&MGMT: CPT

## 2019-01-23 PROCEDURE — 87086 URINE CULTURE/COLONY COUNT: CPT | Performed by: PHYSICIAN ASSISTANT

## 2019-01-23 PROCEDURE — 87631 RESP VIRUS 3-5 TARGETS: CPT | Performed by: INTERNAL MEDICINE

## 2019-01-23 PROCEDURE — 84484 ASSAY OF TROPONIN QUANT: CPT | Performed by: INTERNAL MEDICINE

## 2019-01-23 PROCEDURE — 82805 BLOOD GASES W/O2 SATURATION: CPT | Performed by: PHYSICIAN ASSISTANT

## 2019-01-23 PROCEDURE — 99291 CRITICAL CARE FIRST HOUR: CPT | Performed by: INTERNAL MEDICINE

## 2019-01-23 PROCEDURE — 87040 BLOOD CULTURE FOR BACTERIA: CPT | Performed by: PHYSICIAN ASSISTANT

## 2019-01-23 PROCEDURE — 96374 THER/PROPH/DIAG INJ IV PUSH: CPT

## 2019-01-23 PROCEDURE — 74176 CT ABD & PELVIS W/O CONTRAST: CPT

## 2019-01-23 PROCEDURE — 70450 CT HEAD/BRAIN W/O DYE: CPT

## 2019-01-23 PROCEDURE — 94002 VENT MGMT INPAT INIT DAY: CPT

## 2019-01-23 PROCEDURE — 85610 PROTHROMBIN TIME: CPT | Performed by: PHYSICIAN ASSISTANT

## 2019-01-23 PROCEDURE — 96365 THER/PROPH/DIAG IV INF INIT: CPT

## 2019-01-23 PROCEDURE — 36600 WITHDRAWAL OF ARTERIAL BLOOD: CPT

## 2019-01-23 PROCEDURE — 80048 BASIC METABOLIC PNL TOTAL CA: CPT | Performed by: NURSE PRACTITIONER

## 2019-01-23 PROCEDURE — 85027 COMPLETE CBC AUTOMATED: CPT | Performed by: NURSE PRACTITIONER

## 2019-01-23 PROCEDURE — 81001 URINALYSIS AUTO W/SCOPE: CPT | Performed by: PHYSICIAN ASSISTANT

## 2019-01-23 PROCEDURE — 80048 BASIC METABOLIC PNL TOTAL CA: CPT | Performed by: INTERNAL MEDICINE

## 2019-01-23 PROCEDURE — 96361 HYDRATE IV INFUSION ADD-ON: CPT

## 2019-01-23 PROCEDURE — 84484 ASSAY OF TROPONIN QUANT: CPT | Performed by: NURSE PRACTITIONER

## 2019-01-23 PROCEDURE — 87449 NOS EACH ORGANISM AG IA: CPT | Performed by: NURSE PRACTITIONER

## 2019-01-23 PROCEDURE — 83605 ASSAY OF LACTIC ACID: CPT | Performed by: INTERNAL MEDICINE

## 2019-01-23 PROCEDURE — 0BH18EZ INSERTION OF ENDOTRACHEAL AIRWAY INTO TRACHEA, VIA NATURAL OR ARTIFICIAL OPENING ENDOSCOPIC: ICD-10-PCS | Performed by: EMERGENCY MEDICINE

## 2019-01-23 PROCEDURE — 83880 ASSAY OF NATRIURETIC PEPTIDE: CPT | Performed by: PHYSICIAN ASSISTANT

## 2019-01-23 PROCEDURE — 82140 ASSAY OF AMMONIA: CPT | Performed by: INTERNAL MEDICINE

## 2019-01-23 RX ORDER — HEPARIN SODIUM 1000 [USP'U]/ML
4000 INJECTION, SOLUTION INTRAVENOUS; SUBCUTANEOUS ONCE
Status: COMPLETED | OUTPATIENT
Start: 2019-01-23 | End: 2019-01-23

## 2019-01-23 RX ORDER — SUCCINYLCHOLINE CHLORIDE 20 MG/ML
INJECTION INTRAMUSCULAR; INTRAVENOUS CODE/TRAUMA/SEDATION MEDICATION
Status: COMPLETED | OUTPATIENT
Start: 2019-01-23 | End: 2019-01-23

## 2019-01-23 RX ORDER — HEPARIN SODIUM 10000 [USP'U]/100ML
3-20 INJECTION, SOLUTION INTRAVENOUS
Status: DISCONTINUED | OUTPATIENT
Start: 2019-01-23 | End: 2019-01-24 | Stop reason: HOSPADM

## 2019-01-23 RX ORDER — HEPARIN SODIUM 1000 [USP'U]/ML
2000 INJECTION, SOLUTION INTRAVENOUS; SUBCUTANEOUS AS NEEDED
Status: DISCONTINUED | OUTPATIENT
Start: 2019-01-23 | End: 2019-01-24 | Stop reason: HOSPADM

## 2019-01-23 RX ORDER — CEFEPIME HYDROCHLORIDE 2 G/50ML
2000 INJECTION, SOLUTION INTRAVENOUS EVERY 12 HOURS
Status: DISCONTINUED | OUTPATIENT
Start: 2019-01-23 | End: 2019-01-23

## 2019-01-23 RX ORDER — ASPIRIN 325 MG
325 TABLET, DELAYED RELEASE (ENTERIC COATED) ORAL DAILY
Status: DISCONTINUED | OUTPATIENT
Start: 2019-01-24 | End: 2019-01-24 | Stop reason: HOSPADM

## 2019-01-23 RX ORDER — PROPOFOL 10 MG/ML
INJECTION, EMULSION INTRAVENOUS
Status: COMPLETED
Start: 2019-01-23 | End: 2019-01-23

## 2019-01-23 RX ORDER — LORAZEPAM 2 MG/ML
2 INJECTION INTRAMUSCULAR ONCE
Status: COMPLETED | OUTPATIENT
Start: 2019-01-23 | End: 2019-01-23

## 2019-01-23 RX ORDER — ACETAMINOPHEN 650 MG/1
650 SUPPOSITORY RECTAL EVERY 6 HOURS PRN
Status: DISCONTINUED | OUTPATIENT
Start: 2019-01-23 | End: 2019-01-24 | Stop reason: HOSPADM

## 2019-01-23 RX ORDER — OSELTAMIVIR PHOSPHATE 6 MG/ML
30 FOR SUSPENSION ORAL EVERY 12 HOURS SCHEDULED
Status: DISCONTINUED | OUTPATIENT
Start: 2019-01-23 | End: 2019-01-24

## 2019-01-23 RX ORDER — OSELTAMIVIR PHOSPHATE 30 MG/1
30 CAPSULE ORAL EVERY 12 HOURS SCHEDULED
Status: DISCONTINUED | OUTPATIENT
Start: 2019-01-23 | End: 2019-01-23

## 2019-01-23 RX ORDER — FUROSEMIDE 10 MG/ML
60 INJECTION INTRAMUSCULAR; INTRAVENOUS ONCE
Status: COMPLETED | OUTPATIENT
Start: 2019-01-23 | End: 2019-01-23

## 2019-01-23 RX ORDER — PROPOFOL 10 MG/ML
5-50 INJECTION, EMULSION INTRAVENOUS
Status: DISCONTINUED | OUTPATIENT
Start: 2019-01-23 | End: 2019-01-24

## 2019-01-23 RX ORDER — HEPARIN SODIUM 1000 [USP'U]/ML
4000 INJECTION, SOLUTION INTRAVENOUS; SUBCUTANEOUS AS NEEDED
Status: DISCONTINUED | OUTPATIENT
Start: 2019-01-23 | End: 2019-01-24 | Stop reason: HOSPADM

## 2019-01-23 RX ORDER — ETOMIDATE 2 MG/ML
INJECTION INTRAVENOUS CODE/TRAUMA/SEDATION MEDICATION
Status: COMPLETED | OUTPATIENT
Start: 2019-01-23 | End: 2019-01-23

## 2019-01-23 RX ORDER — METOPROLOL TARTRATE 5 MG/5ML
5 INJECTION INTRAVENOUS ONCE
Status: COMPLETED | OUTPATIENT
Start: 2019-01-23 | End: 2019-01-23

## 2019-01-23 RX ADMIN — ACETAMINOPHEN 650 MG: 650 SUPPOSITORY RECTAL at 23:35

## 2019-01-23 RX ADMIN — ETOMIDATE 20 MG: 2 INJECTION INTRAVENOUS at 19:51

## 2019-01-23 RX ADMIN — CEFEPIME 2000 MG: 2 INJECTION, POWDER, FOR SOLUTION INTRAVENOUS at 17:42

## 2019-01-23 RX ADMIN — METOPROLOL TARTRATE 5 MG: 5 INJECTION, SOLUTION INTRAVENOUS at 17:49

## 2019-01-23 RX ADMIN — LORAZEPAM 2 MG: 2 INJECTION, SOLUTION INTRAMUSCULAR; INTRAVENOUS at 19:57

## 2019-01-23 RX ADMIN — VANCOMYCIN HYDROCHLORIDE 1500 MG: 1 INJECTION, POWDER, LYOPHILIZED, FOR SOLUTION INTRAVENOUS at 23:39

## 2019-01-23 RX ADMIN — HEPARIN SODIUM 4000 UNITS: 1000 INJECTION, SOLUTION INTRAVENOUS; SUBCUTANEOUS at 18:17

## 2019-01-23 RX ADMIN — SUCCINYLCHOLINE CHLORIDE 100 MG: 20 INJECTION, SOLUTION INTRAMUSCULAR; INTRAVENOUS at 19:52

## 2019-01-23 RX ADMIN — FUROSEMIDE 60 MG: 10 INJECTION, SOLUTION INTRAMUSCULAR; INTRAVENOUS at 17:04

## 2019-01-23 RX ADMIN — HEPARIN SODIUM AND DEXTROSE 11.1 UNITS/KG/HR: 10000; 5 INJECTION INTRAVENOUS at 18:15

## 2019-01-23 RX ADMIN — LORAZEPAM 2 MG: 2 INJECTION, SOLUTION INTRAMUSCULAR; INTRAVENOUS at 22:05

## 2019-01-23 RX ADMIN — SODIUM CHLORIDE 2397 ML: 0.9 INJECTION, SOLUTION INTRAVENOUS at 17:02

## 2019-01-23 RX ADMIN — PROPOFOL 5 MCG/KG/MIN: 10 INJECTION, EMULSION INTRAVENOUS at 20:04

## 2019-01-23 NOTE — ED PROVIDER NOTES
History  Chief Complaint   Patient presents with    Chest Pain     Patient states chest pain since yesterday  EMS states patient was in SVT at arrival, gave 6mg of Adenosine IV, and SVT broke  Patient denies any pain at this time  A/P:  Critically ill 60 yr white male  1  Severe sepsis- uti +/- influenza rule out  IVF ideal weight 30ml/kg bolus  Cefepime 2g  Normotensive in ED however pt with high likelihood to develop shock (septic vs cardiogenic)  2  Respiratory failure- ABG is OK  On vapotherm, though without hypoxia continued WOB did not improve, Pt intubated by me and mechanically ventilated in ED prior to admission  3  PATRICK- bo in for I&Os  Baseline normal renal function  Minimal UOP in ED  UA is nitrite positive, possible source, cefepime should cover typical organisms  CK sent and pending also  4  NSTEMI- s/t sepsis or SVT or CHF not likely to be primary AMI  SVT s/p adenosine 6mg prehospital without recurrence of svt  In ED- Heparin, Lopressor, Lasix, trend CXR and EKGs  Maintain telemetry  LBBB is chronic  AVR in 2014 but normal coronaries  5  Transaminitis- denies etoh  Possible s/t sepsis or s/t MI  6  Encephalopathy - s/t acute illness  Possible baseline cognitive impairment  Ammonia negative  CTH no convincing abnormality      History:  60 yr male BIBA from home for evaluation of chest pain sob  Pt not feeling well since yesterday, left work early because suspected fever (felt warm and skin was red) per wife  Felt fine after taking a nap yesterday evening, ate dinner normal  Did not c/o any pain yesterday however pt states he had chest pain yesterday too just did not say anything  Today c/o chest pain and trouble breathing  Called EMS  Per EMS pt was kneeling forward on all 4s on his front porch upon their arrival in the cold for about 5-10 minutes when they got him inside the ambulance his pulse ox was 80% on room air and HR was 170s   ALS met them, 12 lead confirmed SVT, was treated with 6mg adenosine x 1 with resolution of SVT rate 174 to a sinus tachycardia rate 120  BPs were good 140s/80s throughout  Continued to c/o chest pain, repeat EKG showed no ST changes, just LBBB  Given 1 NTG sublingual  Pt hypoxia resolved quickly with application of NRB oxygen high flow  On arrival to ED patient now reports chest pain is resolved  Still sob  Denies cough or congestion  Denies difficulty swallowing however repeatedly states his mouth is so dry he can't talk  Denies any pain  Wife then arrived to give additional history only thing she adds is that thought had fever yesterday because his face was flushed and skin felt warm, did not measure temperature  Wife cannot give clear answer as to whether patient's mental status is at his baseline  She does say his voice/speech sounds normal and that he always stutters and she seems to be able to understand what he is saying and that it sounds normal  Pt had AVR 4-5 yrs ago at [facility], no cabg or stents  Follows annually with dr Rich Schreiber here at [facility] cardiology, last echo was  was told all "OK " PCP sridevi but hasn't had any recent visits  Never been hospitalized before other than his valve surgery  He works fulltime as a [employer] for [employer]   History provided by:  Patient, medical records, EMS personnel and spouse      Prior to Admission Medications   Prescriptions Last Dose Informant Patient Reported? Taking?    amLODIPine (NORVASC) 5 mg tablet   No Yes   Sig: Take 1 tablet (5 mg total) by mouth daily   ascorbic acid (VITAMIN C) 500 MG tablet   Yes Yes   Sig: Take 1 tablet by mouth 2 (two) times a day   aspirin (ECOTRIN) 325 mg EC tablet   No Yes   Sig: Take 1 tablet (325 mg total) by mouth daily   fluticasone (FLONASE) 50 mcg/act nasal spray   Yes Yes   Si spray into each nostril 2 (two) times a day   loratadine (CLARITIN) 10 mg tablet   Yes Yes   Sig: Take 1 tablet by mouth   metoprolol tartrate (LOPRESSOR) 100 mg tablet   No Yes   Sig: Take 1 tablet (100 mg total) by mouth 2 (two) times a day   potassium chloride (K-DUR,KLOR-CON) 20 mEq tablet   No Yes   Sig: Take 1 tablet (20 mEq total) by mouth daily   pravastatin (PRAVACHOL) 40 mg tablet   No Yes   Sig: Take 1 tablet (40 mg total) by mouth daily at bedtime   torsemide (DEMADEX) 20 mg tablet   No Yes   Sig: Take 1 tablet (20 mg total) by mouth daily      Facility-Administered Medications: None       Past Medical History:   Diagnosis Date    Allergic rhinitis     last assessed 9/12/12    Finger fracture, right     Closed fx of the middle phalanx of the right 5th finger  last assessed 1/30/14    Hemorrhoids     last assessed 2/10/14    Hyperlipidemia     Hypertension        Past Surgical History:   Procedure Laterality Date    AORTIC VALVE REPLACEMENT  07/30/2014    AVR with 25mm [facility] Ease bovine pericardial valve    CARDIAC CATHETERIZATION  07/18/2014    SLB left main-normal, circumflex-normal, ramus intermedius-normal, RCA was large and dominant giving rise to the PDA & a large posterolateral branch  No disease  last assessed 8/19/14     KNEE CARTILAGE SURGERY Left     medial meniscus tear     TESTICLE SURGERY      TONSILLECTOMY AND ADENOIDECTOMY      TOOTH EXTRACTION         Family History   Problem Relation Age of Onset    Lung cancer Mother     Heart disease Mother         pacemaker placement     Coronary artery disease Father     Hypertension Father     Heart attack Father     Cancer Family         bladder      I have reviewed and agree with the history as documented  Social History   Substance Use Topics    Smoking status: Never Smoker    Smokeless tobacco: Never Used    Alcohol use No      Comment: ocassion /never drank alcohol per [employer]         Review of Systems   Constitutional: Positive for activity change, fatigue and fever  Negative for chills and diaphoresis  HENT: Negative for congestion, ear pain, postnasal drip, rhinorrhea and sore throat  Respiratory: Positive for chest tightness and shortness of breath  Negative for apnea, cough and wheezing  Cardiovascular: Positive for leg swelling (chronic 20 yrs)  Negative for chest pain and palpitations  Gastrointestinal: Negative for abdominal pain, constipation, diarrhea, nausea and vomiting  Genitourinary: Negative for decreased urine volume, difficulty urinating, flank pain, frequency, hematuria and testicular pain  Musculoskeletal: Negative for arthralgias, back pain, myalgias and neck pain  Skin: Negative for rash and wound  Allergic/Immunologic: Negative for immunocompromised state  Neurological: Positive for speech difficulty ("chronic stuttering" per wife)  Negative for dizziness, tremors, seizures, syncope, facial asymmetry, weakness, light-headedness, numbness and headaches  Hematological: Does not bruise/bleed easily  Psychiatric/Behavioral: Negative for confusion and hallucinations  Physical Exam  Physical Exam   Constitutional: He is oriented to person, place, and time  He appears well-developed and well-nourished  He is cooperative  He has a sickly appearance  He appears ill  He appears distressed  Nasal cannula in place  HENT:   Head: Normocephalic and atraumatic  Right Ear: Tympanic membrane and external ear normal    Left Ear: Tympanic membrane and external ear normal    Nose: Nose normal  No mucosal edema  Mouth/Throat: Oropharynx is clear and moist  Mucous membranes are dry  No posterior oropharyngeal erythema  Eyes: Pupils are equal, round, and reactive to light  Conjunctivae are normal  Right eye exhibits no discharge  Left eye exhibits no discharge  Neck: Neck supple  Cardiovascular: Regular rhythm, normal heart sounds and intact distal pulses  Tachycardia present  Pulses:       Dorsalis pedis pulses are 1+ on the right side, and 1+ on the left side  Posterior tibial pulses are 1+ on the right side, and 1+ on the left side  Pulmonary/Chest: Accessory muscle usage present  Tachypnea noted  He is in respiratory distress  He has decreased breath sounds  He has no wheezes  He has no rhonchi  He has no rales  He exhibits no tenderness  Abdominal: Soft  Bowel sounds are normal  There is no tenderness  There is no CVA tenderness  Musculoskeletal: He exhibits edema  He exhibits no tenderness  Feet:   Right Foot:   Skin Integrity: Positive for dry skin  Negative for skin breakdown or erythema  Left Foot:   Skin Integrity: Positive for dry skin  Negative for skin breakdown or erythema  Neurological: He is alert and oriented to person, place, and time  He has normal strength  He displays no tremor  No cranial nerve deficit or sensory deficit  He displays no seizure activity  GCS eye subscore is 4  GCS verbal subscore is 4  GCS motor subscore is 6  Skin: Skin is warm and dry  Capillary refill takes less than 2 seconds  Rash (scabs on right shoulder, lower abdominal wall) noted  He is not diaphoretic  No erythema  No pallor  Nursing note and vitals reviewed        Vital Signs  ED Triage Vitals   Temperature Pulse Respirations Blood Pressure SpO2   01/23/19 1533 01/23/19 1533 01/23/19 1533 01/23/19 1533 01/23/19 1533   99 °F (37 2 °C) (!) 116 (!) 26 128/79 94 %      Temp Source Heart Rate Source Patient Position - Orthostatic VS BP Location FiO2 (%)   01/23/19 1533 01/23/19 1533 01/23/19 1600 01/23/19 1600 01/23/19 1750   Temporal Monitor Sitting Right arm 50      Pain Score       01/23/19 1533       No Pain           Vitals:    01/24/19 0925 01/24/19 1014 01/24/19 1110 01/24/19 1200   BP: 98/65 (!) 86/58 114/70 93/54   Pulse: 92 86 91 90   Patient Position - Orthostatic VS: Lying Lying Lying        Visual Acuity  Visual Acuity      Most Recent Value   L Pupil Size (mm)  2   R Pupil Size (mm)  2   L Pupil Shape  Round   R Pupil Shape  Round          ED Medications  Medications   heparin (porcine) 25,000 units in 250 mL infusion (premix) (15 1 Units/kg/hr × 90 kg (Order-Specific) Intravenous Rate/Dose Change 1/24/19 0847)   heparin (porcine) injection 4,000 Units (not administered)   heparin (porcine) injection 2,000 Units (2,000 Units Intravenous Given 1/24/19 0846)   acetaminophen (TYLENOL) rectal suppository 650 mg (650 mg Rectal Given 1/24/19 0652)   aspirin (ECOTRIN) EC tablet 325 mg (325 mg Oral Not Given 1/24/19 1011)   thiamine (VITAMIN B1) 200 mg in sodium chloride 0 9 % 50 mL IVPB (200 mg Intravenous New Bag 1/24/19 0921)   dexmedetomidine (PRECEDEX) 200 mcg in sodium chloride 0 9 % 50 mL infusion (0 7 mcg/kg/hr × 154 kg Intravenous New Bag 1/24/19 1057)   albuterol inhalation solution 2 5 mg (not administered)   norepinephrine (LEVOPHED) 4 mg (STANDARD CONCENTRATION) IV in sodium chloride 0 9% 250 mL (24 mcg/min Intravenous New Bag 1/24/19 1022)   vasopressin (PITRESSIN) 20 Units in sodium chloride 0 9 % 100 mL infusion (0 04 Units/min Intravenous New Bag 1/24/19 1018)   vancomycin (VANCOCIN) 2,000 mg in sodium chloride 0 9 % 500 mL IVPB (not administered)   ceFAZolin (ANCEF) IVPB (premix) 1,000 mg (not administered)   metroNIDAZOLE (FLAGYL) IVPB (premix) 500 mg (not administered)   sodium bicarbonate 150 mEq in dextrose 5 % 1,000 mL infusion (not administered)    EMS REPLENISHMENT MED ( Does not apply Given to EMS 1/23/19 1548)   sodium chloride 0 9 % bolus 2,397 mL (0 mL/kg × 79 9 kg (Ideal) Intravenous Stopped 1/23/19 1950)   furosemide (LASIX) injection 60 mg (60 mg Intravenous Given 1/23/19 1704)   heparin (porcine) injection 4,000 Units (4,000 Units Intravenous Given 1/23/19 1817)   cefepime (MAXIPIME) 2,000 mg in dextrose 5 % 50 mL IVPB (0 mg Intravenous Stopped 1/23/19 1812)   metoprolol (LOPRESSOR) injection 5 mg (5 mg Intravenous Given 1/23/19 1749)   etomidate (AMIDATE) 2 mg/mL injection (20 mg Intravenous Given 1/23/19 1951)   succinylcholine (ANECTINE) 20 mg/mL injection (100 mg Intravenous Given 1/23/19 1952) LORazepam (ATIVAN) 2 mg/mL injection 2 mg (2 mg Intravenous Given 1/23/19 1957)   LORazepam (ATIVAN) 2 mg/mL injection 2 mg (2 mg Intravenous Given 1/23/19 2205)   sodium chloride 0 9 % bolus 500 mL (0 mL Intravenous Stopped 1/24/19 0300)   fentanyl citrate (PF) 100 MCG/2ML 50 mcg (50 mcg Intravenous Given 1/24/19 0252)   sodium bicarbonate 8 4 % injection 50 mEq (50 mEq Intravenous Given 1/24/19 0532)   diltiazem (CARDIZEM) injection 10 mg (10 mg Intravenous Given 1/24/19 0716)   sodium chloride 0 9 % bolus 500 mL (0 mL Intravenous Stopped 1/24/19 0425)   sodium chloride 0 9 % bolus 1,000 mL (0 mL Intravenous Stopped 1/24/19 0425)   perflutren lipid microsphere (DEFINITY) injection (8 mL/min Intravenous Given 1/24/19 0906)       Diagnostic Studies  Results Reviewed     Procedure Component Value Units Date/Time    Sputum culture and Gram stain [[id number]] Collected:  01/24/19 1008    Lab Status:   In process Specimen:  Sputum from Induced Sputum Updated:  01/24/19 1015    Blood culture [[id number]] Collected:  01/23/19 1654    Lab Status:  Preliminary result Specimen:  Blood from Arm, Left Updated:  01/24/19 0958     Gram Stain Result Gram positive cocci in clusters    Blood culture [[id number]] Collected:  01/23/19 1600    Lab Status:  Preliminary result Specimen:  Blood from Arm, Right Updated:  01/24/19 0515     Gram Stain Result Gram positive cocci in clusters    Influenza A/B and RSV by PCR [[id number]]  (Normal) Collected:  01/23/19 1850    Lab Status:  Final result Specimen:  Nasopharyngeal from Nasopharyngeal Swab Updated:  01/24/19 0332     INFLU A PCR None Detected     INFLU B PCR None Detected     RSV PCR None Detected    Blood gas, arterial [[id number]]  (Abnormal) Collected:  01/23/19 2109    Lab Status:  Final result Specimen:  Blood, Arterial from Radial, Left Updated:  01/23/19 2115     pH, Arterial 7 400     pCO2, Arterial 35 1 (L) mm Hg      pO2, Arterial 261 7 (H) mm Hg      HCO3, Arterial 21 3 (L) mmol/L      Base Excess, Arterial -2 8 mmol/L      O2 Content, Arterial 21 5 mL/dL      O2 HGB,Arterial  98 7 (H) %      SOURCE Radial, Left     MARLON TEST Yes     Vent Type- AC AC     AC Rate 18     Tidal Volume 500 ml      Inspired Air (FIO2) 100     PEEP 7    Troponin I [[id number]]  (Abnormal) Collected:  01/23/19 1850    Lab Status:  Final result Specimen:  Blood from Hand, Left Updated:  01/23/19 1942     Troponin I 24 29 (HH) ng/mL     Ammonia [[id number]]  (Normal) Collected:  01/23/19 1850    Lab Status:  Final result Specimen:  Blood from Hand, Left Updated:  01/23/19 1924     Ammonia 17 umol/L     Lactic acid, plasma [[id number]]  (Abnormal) Collected:  01/23/19 1850    Lab Status:  Final result Specimen:  Blood from Hand, Left Updated:  01/23/19 1923     LACTIC ACID 3 3 (HH) mmol/L     Narrative:         Result may be elevated if tourniquet was used during collection  Basic metabolic panel [[id number]]  (Abnormal) Collected:  01/23/19 1850    Lab Status:  Final result Specimen:  Blood from Hand, Left Updated:  01/23/19 1921     Sodium 140 mmol/L      Potassium 3 9 mmol/L      Chloride 102 mmol/L      CO2 25 mmol/L      ANION GAP 13 mmol/L      BUN 36 (H) mg/dL      Creatinine 2 29 (H) mg/dL      Glucose 136 mg/dL      Calcium 8 2 (L) mg/dL      eGFR 30 ml/min/1 73sq m     Narrative:         National Kidney Disease Education Program recommendations are as follows:  GFR calculation is accurate only with a steady state creatinine  Chronic Kidney disease less than 60 ml/min/1 73 sq  meters  Kidney failure less than 15 ml/min/1 73 sq  meters  [place] draw [[id number]] Collected:  01/23/19 1600    Lab Status:  Final result Specimen:  Blood Updated:  01/23/19 1801    Narrative: The following orders were created for panel order [place] draw    Procedure                               Abnormality         Status                     ---------                               -----------         ------ Gold top on UKQT[[id number]]                                 Final result               Lavender Top 7ml on hold[[id number]]                         Final result                 Please view results for these tests on the individual orders  Blood gas, arterial [[id number]]  (Abnormal) Collected:  01/23/19 1736    Lab Status:  Final result Specimen:  Blood, Arterial from Radial, Left Updated:  01/23/19 1751     pH, Arterial 7 408     pCO2, Arterial 35 5 (L) mm Hg      pO2, Arterial 148 2 (H) mm Hg      HCO3, Arterial 21 9 (L) mmol/L      Base Excess, Arterial -2 1 mmol/L      O2 Content, Arterial 20 2 mL/dL      O2 HGB,Arterial  97 9 (H) %      SOURCE Radial, Left     MARLON TEST Yes     Non Vent type-     Urine Microscopic [[id number]]  (Abnormal) Collected:  01/23/19 1730    Lab Status:  Final result Specimen:  Urine from Urine, Indwelling Turner Catheter Updated:  01/23/19 1744     RBC, UA Innumerable (A) /hpf      WBC, UA       Field obscured, unable to enumerate (A)     /hpf     Epithelial Cells       Field obscured, unable to enumerate (A)     /hpf     Bacteria, UA       Field obscured, unable to enumerate (A)     /hpf    Urine culture [[id number]] Collected:  01/23/19 1730    Lab Status:   In process Specimen:  Urine from Urine, Indwelling Turner Catheter Updated:  01/23/19 1744    UA w Reflex to Microscopic w Reflex to Culture [[id number]]  (Abnormal) Collected:  01/23/19 1730    Lab Status:  Final result Specimen:  Urine from Urine, Indwelling Turner Catheter Updated:  01/23/19 1742     Color, UA Neda     Clarity, UA Cloudy     Specific Gravity, UA 1 025     pH, UA 5 5     Leukocytes, UA Negative     Nitrite, UA Positive (A)     Protein, UA >=300 (A) mg/dl      Glucose, UA Negative mg/dl      Ketones, UA Negative mg/dl      Urobilinogen, UA 2 0 (A) E U /dl      Bilirubin, UA Interference- unable to analyze (A)     Blood, UA Large (A)    Troponin I [[id number]]  (Abnormal) Collected:  01/23/19 1600 Lab Status:  Final result Specimen:  Blood from Arm, Right Updated:  01/23/19 1650     Troponin I 14 20 (HH) ng/mL     Comprehensive metabolic panel [[id number]]  (Abnormal) Collected:  01/23/19 1600    Lab Status:  Final result Specimen:  Blood from Arm, Right Updated:  01/23/19 1636     Sodium 138 mmol/L      Potassium 3 9 mmol/L      Chloride 99 (L) mmol/L      CO2 25 mmol/L      ANION GAP 14 (H) mmol/L      BUN 34 (H) mg/dL      Creatinine 2 63 (H) mg/dL      Glucose 146 (H) mg/dL      Calcium 8 0 (L) mg/dL       (H) U/L      ALT 90 (H) U/L      Alkaline Phosphatase 89 U/L      Total Protein 7 4 g/dL      Albumin 3 3 (L) g/dL      Total Bilirubin 1 40 (H) mg/dL      eGFR 25 ml/min/1 73sq m     Narrative:         National Kidney Disease Education Program recommendations are as follows:  GFR calculation is accurate only with a steady state creatinine  Chronic Kidney disease less than 60 ml/min/1 73 sq  meters  Kidney failure less than 15 ml/min/1 73 sq  meters      Magnesium [[id number]]  (Normal) Collected:  01/23/19 1600    Lab Status:  Final result Specimen:  Blood from Arm, Right Updated:  01/23/19 1636     Magnesium 2 0 mg/dL     B-type natriuretic peptide [[id number]]  (Abnormal) Collected:  01/23/19 1600    Lab Status:  Final result Specimen:  Blood from Arm, Right Updated:  01/23/19 1636     NT-proBNP 25,548 (H) pg/mL     CBC and differential [[id number]]  (Abnormal) Collected:  01/23/19 1600    Lab Status:  Final result Specimen:  Blood from Arm, Right Updated:  01/23/19 1635     WBC 16 01 (H) Thousand/uL      RBC 5 51 Million/uL      Hemoglobin 14 6 g/dL      Hematocrit 47 0 %      MCV 85 fL      MCH 26 5 (L) pg      MCHC 31 1 (L) g/dL      RDW 15 8 (H) %      MPV 11 7 fL      Platelets 991 (L) Thousands/uL      nRBC 0 /100 WBCs      Neutrophils Relative 94 (H) %      Immat GRANS % 1 %      Lymphocytes Relative 2 (L) %      Monocytes Relative 3 (L) %      Eosinophils Relative 0 %      Basophils Relative 0 %      Neutrophils Absolute 15 03 (H) Thousands/µL      Immature Grans Absolute 0 19 Thousand/uL      Lymphocytes Absolute 0 25 (L) Thousands/µL      Monocytes Absolute 0 51 Thousand/µL      Eosinophils Absolute 0 00 Thousand/µL      Basophils Absolute 0 03 Thousands/µL     Narrative: This is an appended report  These results have been appended to a previously verified report  Lactic acid, plasma [[id number]]  (Abnormal) Collected:  01/23/19 1600    Lab Status:  Final result Specimen:  Blood from Arm, Right Updated:  01/23/19 1633     LACTIC ACID 5 5 (HH) mmol/L     Narrative:         Result may be elevated if tourniquet was used during collection  Protime-INR [[id number]]  (Abnormal) Collected:  01/23/19 1600    Lab Status:  Final result Specimen:  Blood from Arm, Right Updated:  01/23/19 1630     Protime 16 9 (H) seconds      INR 1 45 (H)    APTT [[id number]]  (Normal) Collected:  01/23/19 1600    Lab Status:  Final result Specimen:  Blood from Arm, Right Updated:  01/23/19 1630     PTT 34 seconds                  XR chest portable   Final Result by Jordyn Roach MD (01/24 0038)      1  Appropriately positioned left IJ catheter  No pneumothorax on either side  2   Otherwise, unchanged lines and tubes  3   Persistent pulmonary vascular congestion and atelectasis in the lingula and left lower lobe  Workstation performed: [id number]         XR chest portable   Final Result by Jordyn Roach MD (01/24 0378)   FINDINGS/IMPRESSION:      1  Right IJ catheter tip is superficial in location projecting over the region of the inferior right jugular vein  Recommend replacement  2   Appropriately positioned endotracheal tube  3   Unchanged enteric tube with side-port at the distal esophagus  4   Unchanged low-grade alveolar edema and left midlung platelike atelectasis  5   Unchanged heart and megaly and pulmonary vascular congestion        The study was marked in EPIC for significant notification  Workstation performed: [id number]         XR chest portable   Final Result by Elton Gilliland MD (01/24 0871)      1  Endotracheal tube tip appears slightly retracted from prior  However, tip remains below the clavicles in the thoracic trachea  2   Alveolar edema appears decreased, but there are new platelike opacities in the left midlung field with atelectasis  3   Enteric tube tip in the stomach with side-port in distal esophagus  If the tube is being used for decompression, positioning is adequate  If the tube is being used for feeding, recommend advancing by 12 cm  4   No pneumothorax  Workstation performed: [id number]         XR chest 1 view portable   Final Result by Elton Gilliland MD (01/24 1152)      1  Increasing symmetric consolidative opacities  This most likely represents increasing alveolar edema  Correlate with BNP, if needed  Multifocal pneumonia is felt to be less likely given presence of pulmonary vascular congestion  2   Appropriately positioned endotracheal tube  Workstation performed: [id number]         CT chest abdomen pelvis wo contrast   Final Result by Juanita Calix MD (01/23 2230)      Patchy consolidative changes at the bilateral lung bases, likely secondary to atelectasis, or alternatively, in the appropriate clinical context, acute infiltrate, possibly secondary to aspiration                Workstation performed: [id number]         CT head without contrast   Final Result by Carlyle White MD (01/23 8941)      Area of low density/diminished gray-white distinction in the inferior right frontal lobe which is probably artifactual, however, 12 hour follow-up reassessment suggested as this could be subacute ischemia  No hemorrhage  Dilated intraorbital vessels bilaterally perhaps venous varicosities  No gross evidence of pathology of the cavernous sinus  Workstation performed: [id number]         XR chest 1 view portable   Final Result by Nicholas Munguia MD (01/23 1620)      Findings of volume overload including pulmonary vascular congestion and mild alveolar edema  No effusions demonstrated              Workstation performed: [id number]                    Procedures  ECG 12 Lead Documentation  Date/Time: 1/23/2019 3:27 PM  Performed by: Masha Garcia  Authorized by: Masha Garcia     Indications / Diagnosis:  Cp/sob  ECG reviewed by me, the ED Provider: yes    Patient location:  ED  Rate:     ECG rate:  113  Rhythm:     Rhythm: sinus tachycardia    Ectopy:     Ectopy: none    QRS:     QRS axis:  Normal  Conduction:     Conduction: abnormal      Abnormal conduction: complete LBBB    ST segments:     ST segments:  Normal  T waves:     T waves: normal    CriticalCare Time  Performed by: Masha Garcia  Authorized by: Masha Garcia     Critical care provider statement:     Critical care time (minutes):  75    Critical care was necessary to treat or prevent imminent or life-threatening deterioration of the following conditions:  Cardiac failure, dehydration, renal failure, respiratory failure and sepsis    Critical care was time spent personally by me on the following activities:  Obtaining history from patient or surrogate, development of treatment plan with patient or surrogate, discussions with consultants, evaluation of patient's response to treatment, examination of patient, review of old charts, re-evaluation of patient's condition, ordering and review of radiographic studies, ordering and review of laboratory studies and ordering and performing treatments and interventions    I assumed direction of critical care for this patient from another provider in my specialty: yes    Comments:      I have discussed this case in its entirety with my cosigning/attending ED physician  [facility] including H&P, Imaging studies, Lab Values, procedures, any consults and final disposition including discussion of critical care assessment and plans as above  Phone Contacts  ED Phone Contact    ED Course  ED Course as of Jan 24 1327 Wed Jan 23, 2019   1644 NT-proBNP: (!) 63,874   1644 Magnesium: 2 0   1644 WBC: (!) 16 01   1644 Hemoglobin: 14 6   1644 HCT: 47 0   1644 Platelet Count: (!) 755   1644 Sodium: 138   1644 Potassium: 3 9   1644 Chloride: (!) 99   1644 CO2: 25   1644 Anion Gap: (!) 14   1644 BUN: (!) 34   1644 Creatinine: (!) 2 63   1644 Glucose, Random: (!) 146   1644 eGFR: 25   1644 LACTIC ACID: (!!) 5 5   1652 Cefepime, ideal weight fluid bolus for sepsis measures; also heparin for nstemi pending negative head CT  New heart failure cxr congestion and bnp 25k  Lasix 60mg IV x1  Fluids for renal failure  BIPAP if need  1655 Chest pain sob since yesterday, near syncope with svt pre-hospital is s/p adenosine  HR 120s sinus now  /80s     1655 Paged cardiology    1744 Color, UA: Neda   1746 Clarity, UA: Cloudy   1746 Nitrite, UA: (!) Positive   1746 Leukocytes, UA: Negative   1746 POCT URINE PROTEIN: (!) >=300   1746 Blood, UA: (!) Large   1754 pH, Arterial: 7 408   1754 pCO2, Arterial: (!) 35 5   1754 pO2, Arterial: (!) 148  2   1754 D/w SLIM dr samuel will come see patient at bedside to help decide pt admission destination/transfer  1756 Hold remaining fluid bolus, completed 1L, lasix given, watch urine output and work of breathing as complicated by heart failure    1800 Dr Yohan Marcelino at bedside  New orders in  [facility]  1813 Proceed with IVF sepsis bolus  Further discussion likely sepsis driven illness not primary cardiac     1821 Called to pacs can cancel transfer pt staying at [address] at bedside WOB continues with tachypnea despite vapotherm  Sats OK  HR remains elevated despite vapotherm and lopressor   Mentation still confused but awake and eyes open and will answer and follow commands mostly saying "yes OK yes" to questions  Decision to intubate patient at this time, discussed again with wife and patient including risks benefits and how it will alter treatment plan, both agree to same  RT called to bedside  RN x 2 present  Myself and attending present and ready for procedure  See procedure note 19:51 for endotracheal intubation for respiratory  failure    2017 Updated CARLOS samuel of status update  He will be down to see patient  ICU bed assignment still not ready yet  House supervisor aware  2019 Dr Peter Burgess at bedside    2019 Wife leaving now to go home will return in AM  Will be reachable by phone  #s listed  [id number] Verbal report given to brandyn GONZALEZ by me at pt's bedside at this time          HEART Risk Score      Most Recent Value   History  1 Filed at: 01/23/2019 1821   ECG  1 Filed at: 01/23/2019 1821   Age  1 Filed at: 01/23/2019 1821   Risk Factors  2 Filed at: 01/23/2019 1821   Troponin  2 Filed at: 01/23/2019 1821   Heart Score Risk Calculator   History  1 Filed at: 01/23/2019 1821   ECG  1 Filed at: 01/23/2019 1821   Age  1 Filed at: 01/23/2019 1821   Risk Factors  2 Filed at: 01/23/2019 1821   Troponin  2 Filed at: 01/23/2019 1821   HEART Score  7 Filed at: 01/23/2019 1821   HEART Score  7 Filed at: 01/23/2019 1821        MDM  Number of Diagnoses or Management Options  Abnormal head CT: new and requires workup  PATRICK (acute kidney injury) ([facility] ): new and requires workup  Elevated brain natriuretic peptide (BNP) level: new and requires workup  Elevated troponin: new and requires workup  Encephalopathy acute: new and requires workup  Respiratory failure ([facility] ): new and requires workup  Severe sepsis [facility]): new and requires workup  SVT (supraventricular tachycardia) ([facility] ): new and requires workup  Transaminitis: new and requires workup  UTI (urinary tract infection): new and requires workup  Diagnosis management comments:     1  Severe sepsis- uti +/- influenza rule out   IVF ideal weight 30ml/kg bolus  Cefepime 2g  Normotensive in ED however pt with high likelihood to develop shock (septic vs cardiogenic)  2  Respiratory failure- ABG is OK  On vapotherm, though without hypoxia continued WOB did not improve, Pt intubated by me and mechanically ventilated in ED prior to admission  3  PATRICK- bo in for I&Os  Baseline normal renal function  Minimal UOP in ED  UA is nitrite positive, possible source, cefepime should cover typical organisms  4  NSTEMI- s/t sepsis or SVT or CHF not likely to be primary AMI  SVT s/p adenosine 6mg prehospital without recurrence of svt  In ED- Heparin, Lopressor, Lasix, trend CXR and EKGs  Maintain telemetry  LBBB is chronic  AVR in 2014 but normal coronaries  5  Transaminitis- denies etoh  Possible s/t sepsis or s/t MI  6  Encephalopathy - s/t acute illness  Possible baseline cognitive impairment  Total time providing critical care: 75 minutes- see attached procedure note          Disposition  Final diagnoses:   Severe sepsis ([facility] )   PATRICK (acute kidney injury) ([facility] 75 )   Elevated troponin   Respiratory failure (HCC)   Elevated brain natriuretic peptide (BNP) level   Transaminitis   UTI (urinary tract infection)   Abnormal head CT   SVT (supraventricular tachycardia) (HCC)   Encephalopathy acute     Time reflects when diagnosis was documented in both MDM as applicable and the Disposition within this note     Time User Action Codes Description Comment    1/23/2019  6:23 PM Cam Foots Add [A41 9,  R65 20] Severe sepsis ([facility] )     1/23/2019  6:24 PM Cam Foots Add [N17 9] PATRICK (acute kidney injury) ([facility] )     1/23/2019  6:25 PM Cam Foots Add [R74 8] Elevated troponin     1/23/2019  6:25 PM Cam Foots Add [J96 90] Respiratory failure ([facility] )     1/23/2019  6:25 PM Cam Foots Add [R79 89] Elevated brain natriuretic peptide (BNP) level     1/23/2019  6:25 PM Cam Foots Add [R74 0] Transaminitis     1/23/2019  6:25 PM Cam Foots Add [N39 0] UTI (urinary tract infection)     1/23/2019  6:25 PM Yolande Rhea Add [R93 0] Abnormal head CT     1/23/2019  6:26 PM Yolande Rhea Add [I47 1] SVT (supraventricular tachycardia) ([address] )     1/23/2019  6:28 PM Yolande Rhea Add [G93 40] Encephalopathy acute     1/23/2019 10:52 PM Miroslava Nam Tunde Janinaallison Add [I21 4] NSTEMI (non-ST elevation myocardial infarction) [facility])       ED Disposition     ED Disposition Condition Comment    Admit  Case was discussed with CARLOS and the patient's admission status was agreed to be Admission Status: inpatient status to the service of Dr Lesley Holley   Follow-up Information    None         Discharge Medication List as of 1/24/2019  1:22 PM      CONTINUE these medications which have NOT CHANGED    Details   amLODIPine (NORVASC) 5 mg tablet Take 1 tablet (5 mg total) by mouth daily, Starting Fri 11/16/2018, Normal      ascorbic acid (VITAMIN C) 500 MG tablet Take 1 tablet by mouth 2 (two) times a day, Historical Med      aspirin (ECOTRIN) 325 mg EC tablet Take 1 tablet (325 mg total) by mouth daily, Starting Fri 11/16/2018, Normal      fluticasone (FLONASE) 50 mcg/act nasal spray 1 spray into each nostril 2 (two) times a day, Starting Thu 2/19/2015, Historical Med      loratadine (CLARITIN) 10 mg tablet Take 1 tablet by mouth, Historical Med      metoprolol tartrate (LOPRESSOR) 100 mg tablet Take 1 tablet (100 mg total) by mouth 2 (two) times a day, Starting Fri 11/16/2018, Normal      potassium chloride (K-DUR,KLOR-CON) 20 mEq tablet Take 1 tablet (20 mEq total) by mouth daily, Starting Fri 11/16/2018, Normal      pravastatin (PRAVACHOL) 40 mg tablet Take 1 tablet (40 mg total) by mouth daily at bedtime, Starting Fri 11/16/2018, Normal      torsemide (DEMADEX) 20 mg tablet Take 1 tablet (20 mg total) by mouth daily, Starting Fri 11/16/2018, Normal           No discharge procedures on file      ED Provider  Electronically Signed by           Melania Hoffman PA-C  01/23/19 Addi Mckeon Jose Miguel Turner PA-C  01/23/19 9099       Gabriela Mahmood PA-C  01/24/19 [address], [place]  01/24/19 1325

## 2019-01-23 NOTE — ED NOTES
Patient transported to [address] [place] [address], [address] [place] [address]  01/23/19 7313

## 2019-01-24 ENCOUNTER — APPOINTMENT (INPATIENT)
Dept: RADIOLOGY | Facility: HOSPITAL | Age: 59
DRG: 871 | End: 2019-01-24
Payer: COMMERCIAL

## 2019-01-24 ENCOUNTER — HOSPITAL ENCOUNTER (INPATIENT)
Facility: HOSPITAL | Age: 59
LOS: 51 days | Discharge: NURSING, REHAB OR LONG-TERM CARE | DRG: 871 | End: 2019-03-16
Attending: EMERGENCY MEDICINE | Admitting: INTERNAL MEDICINE
Payer: COMMERCIAL

## 2019-01-24 ENCOUNTER — APPOINTMENT (INPATIENT)
Dept: NON INVASIVE DIAGNOSTICS | Facility: HOSPITAL | Age: 59
DRG: 871 | End: 2019-01-24
Payer: COMMERCIAL

## 2019-01-24 VITALS
DIASTOLIC BLOOD PRESSURE: 54 MMHG | SYSTOLIC BLOOD PRESSURE: 93 MMHG | BODY MASS INDEX: 44.79 KG/M2 | TEMPERATURE: 99.8 F | OXYGEN SATURATION: 94 % | RESPIRATION RATE: 21 BRPM | WEIGHT: 315 LBS | HEART RATE: 90 BPM

## 2019-01-24 DIAGNOSIS — R21 SKIN RASH: ICD-10-CM

## 2019-01-24 DIAGNOSIS — R57.0 CARDIOGENIC SHOCK (HCC): ICD-10-CM

## 2019-01-24 DIAGNOSIS — R65.21 SEVERE SEPSIS WITH SEPTIC SHOCK (CODE) (HCC): ICD-10-CM

## 2019-01-24 DIAGNOSIS — N17.9 AKI (ACUTE KIDNEY INJURY) (HCC): ICD-10-CM

## 2019-01-24 DIAGNOSIS — G92.8 TOXIC METABOLIC ENCEPHALOPATHY: ICD-10-CM

## 2019-01-24 DIAGNOSIS — M72.6: ICD-10-CM

## 2019-01-24 DIAGNOSIS — R65.21 SEPTIC SHOCK (HCC): ICD-10-CM

## 2019-01-24 DIAGNOSIS — N18.9 ACUTE RENAL FAILURE WITH ACUTE TUBULAR NECROSIS SUPERIMPOSED ON CHRONIC KIDNEY DISEASE, ON CHRONIC DIALYSIS (HCC): ICD-10-CM

## 2019-01-24 DIAGNOSIS — M60.022: ICD-10-CM

## 2019-01-24 DIAGNOSIS — Z99.2 ACUTE RENAL FAILURE WITH ACUTE TUBULAR NECROSIS SUPERIMPOSED ON CHRONIC KIDNEY DISEASE, ON CHRONIC DIALYSIS (HCC): ICD-10-CM

## 2019-01-24 DIAGNOSIS — M79.89 LEFT ARM SWELLING: ICD-10-CM

## 2019-01-24 DIAGNOSIS — R78.81 STAPHYLOCOCCUS AUREUS BACTEREMIA: ICD-10-CM

## 2019-01-24 DIAGNOSIS — N17.0 ACUTE RENAL FAILURE WITH ACUTE TUBULAR NECROSIS SUPERIMPOSED ON CHRONIC KIDNEY DISEASE, ON CHRONIC DIALYSIS (HCC): ICD-10-CM

## 2019-01-24 DIAGNOSIS — J96.01 ACUTE RESPIRATORY FAILURE WITH HYPOXIA (HCC): Primary | ICD-10-CM

## 2019-01-24 DIAGNOSIS — K92.2 GI BLEEDING: ICD-10-CM

## 2019-01-24 DIAGNOSIS — A41.9 SEPTIC SHOCK (HCC): ICD-10-CM

## 2019-01-24 DIAGNOSIS — T14.8XXA DEEP TISSUE INJURY: ICD-10-CM

## 2019-01-24 DIAGNOSIS — R41.82 CHANGE IN MENTAL STATUS: ICD-10-CM

## 2019-01-24 DIAGNOSIS — R57.1 HYPOVOLEMIC SHOCK (HCC): ICD-10-CM

## 2019-01-24 DIAGNOSIS — I21.4 NSTEMI (NON-ST ELEVATION MYOCARDIAL INFARCTION) (HCC): ICD-10-CM

## 2019-01-24 DIAGNOSIS — Z09 FOLLOW UP: ICD-10-CM

## 2019-01-24 DIAGNOSIS — L60.8 BLACK NAILS: ICD-10-CM

## 2019-01-24 DIAGNOSIS — B95.61 STAPHYLOCOCCUS AUREUS BACTEREMIA: ICD-10-CM

## 2019-01-24 DIAGNOSIS — F32.A DEPRESSION: ICD-10-CM

## 2019-01-24 DIAGNOSIS — S99.922D TOE INJURY, LEFT, SUBSEQUENT ENCOUNTER: ICD-10-CM

## 2019-01-24 PROBLEM — R34 OLIGURIA: Status: ACTIVE | Noted: 2019-01-24

## 2019-01-24 PROBLEM — I51.81 STRESS-INDUCED CARDIOMYOPATHY: Status: ACTIVE | Noted: 2019-01-24

## 2019-01-24 PROBLEM — M62.82 NON-TRAUMATIC RHABDOMYOLYSIS: Status: ACTIVE | Noted: 2019-01-24

## 2019-01-24 PROBLEM — I48.91 ATRIAL FIBRILLATION (HCC): Status: ACTIVE | Noted: 2019-01-24

## 2019-01-24 PROBLEM — Z95.3 HISTORY OF AORTIC VALVE REPLACEMENT WITH BIOPROSTHETIC VALVE: Chronic | Status: ACTIVE | Noted: 2019-01-24

## 2019-01-24 LAB
ALBUMIN SERPL BCP-MCNC: 2.4 G/DL (ref 3.5–5)
ALP SERPL-CCNC: 73 U/L (ref 46–116)
ALT SERPL W P-5'-P-CCNC: 344 U/L (ref 12–78)
ANION GAP SERPL CALCULATED.3IONS-SCNC: 14 MMOL/L (ref 4–13)
APTT PPP: 46 SECONDS (ref 26–38)
APTT PPP: 48 SECONDS (ref 26–38)
APTT PPP: 51 SECONDS (ref 26–38)
ARTERIAL PATENCY WRIST A: YES
AST SERPL W P-5'-P-CCNC: 1513 U/L (ref 5–45)
ATRIAL RATE: 103 BPM
ATRIAL RATE: 117 BPM
ATRIAL RATE: 125 BPM
ATRIAL RATE: 132 BPM
ATRIAL RATE: 97 BPM
BACTERIA UR CULT: ABNORMAL
BASE EX.OXY STD BLDV CALC-SCNC: 52.5 % (ref 60–80)
BASE EX.OXY STD BLDV CALC-SCNC: 84.1 % (ref 60–80)
BASE EXCESS BLDA CALC-SCNC: -6.9 MMOL/L
BASE EXCESS BLDA CALC-SCNC: -7.7 MMOL/L
BASE EXCESS BLDV CALC-SCNC: -7.4 MMOL/L
BASE EXCESS BLDV CALC-SCNC: -7.6 MMOL/L
BILIRUB SERPL-MCNC: 0.73 MG/DL (ref 0.2–1)
BLD SMEAR INTERP: NORMAL
BUN SERPL-MCNC: 44 MG/DL (ref 5–25)
BUN SERPL-MCNC: 45 MG/DL (ref 5–25)
BUN SERPL-MCNC: 52 MG/DL (ref 5–25)
CA-I BLD-SCNC: 0.91 MMOL/L (ref 1.12–1.32)
CALCIUM SERPL-MCNC: 6.6 MG/DL (ref 8.3–10.1)
CALCIUM SERPL-MCNC: 7 MG/DL (ref 8.3–10.1)
CALCIUM SERPL-MCNC: 7.5 MG/DL (ref 8.3–10.1)
CHLORIDE SERPL-SCNC: 104 MMOL/L (ref 100–108)
CHLORIDE SERPL-SCNC: 106 MMOL/L (ref 100–108)
CHLORIDE SERPL-SCNC: 107 MMOL/L (ref 100–108)
CK MB SERPL-MCNC: 273.4 NG/ML (ref 0–5)
CK MB SERPL-MCNC: 341.2 NG/ML (ref 0–5)
CK MB SERPL-MCNC: <1 % (ref 0–2.5)
CK MB SERPL-MCNC: <1 % (ref 0–2.5)
CK SERPL-CCNC: ABNORMAL U/L (ref 39–308)
CK SERPL-CCNC: ABNORMAL U/L (ref 39–308)
CO2 SERPL-SCNC: 17 MMOL/L (ref 21–32)
CO2 SERPL-SCNC: 22 MMOL/L (ref 21–32)
CO2 SERPL-SCNC: 22 MMOL/L (ref 21–32)
CREAT SERPL-MCNC: 3.06 MG/DL (ref 0.6–1.3)
CREAT SERPL-MCNC: 3.7 MG/DL (ref 0.6–1.3)
CREAT SERPL-MCNC: 4.03 MG/DL (ref 0.6–1.3)
ERYTHROCYTE [DISTWIDTH] IN BLOOD BY AUTOMATED COUNT: 16.4 % (ref 11.6–15.1)
ERYTHROCYTE [DISTWIDTH] IN BLOOD BY AUTOMATED COUNT: 16.8 % (ref 11.6–15.1)
FIBRINOGEN PPP-MCNC: 709 MG/DL (ref 227–495)
FLUAV AG SPEC QL: NORMAL
FLUBV AG SPEC QL: NORMAL
GFR SERPL CREATININE-BSD FRML MDRD: 15 ML/MIN/1.73SQ M
GFR SERPL CREATININE-BSD FRML MDRD: 17 ML/MIN/1.73SQ M
GFR SERPL CREATININE-BSD FRML MDRD: 21 ML/MIN/1.73SQ M
GLUCOSE SERPL-MCNC: 109 MG/DL (ref 65–140)
GLUCOSE SERPL-MCNC: 111 MG/DL (ref 65–140)
GLUCOSE SERPL-MCNC: 206 MG/DL (ref 65–140)
HCO3 BLDA-SCNC: 16.9 MMOL/L (ref 22–28)
HCO3 BLDA-SCNC: 17 MMOL/L (ref 22–28)
HCO3 BLDV-SCNC: 18 MMOL/L (ref 24–30)
HCO3 BLDV-SCNC: 20.4 MMOL/L (ref 24–30)
HCT VFR BLD AUTO: 43.4 % (ref 36.5–49.3)
HCT VFR BLD AUTO: 44.2 % (ref 36.5–49.3)
HGB BLD-MCNC: 13.3 G/DL (ref 12–17)
HGB BLD-MCNC: 13.8 G/DL (ref 12–17)
HOROWITZ INDEX BLDA+IHG-RTO: 40 MM[HG]
HOROWITZ INDEX BLDA+IHG-RTO: 40 MM[HG]
INR PPP: 1.04 (ref 0.86–1.17)
L PNEUMO1 AG UR QL IA.RAPID: NEGATIVE
LACTATE SERPL-SCNC: 1.3 MMOL/L (ref 0.5–2)
LACTATE SERPL-SCNC: 2.2 MMOL/L (ref 0.5–2)
LACTATE SERPL-SCNC: 2.5 MMOL/L (ref 0.5–2)
MAGNESIUM SERPL-MCNC: 2 MG/DL (ref 1.6–2.6)
MAGNESIUM SERPL-MCNC: 2.2 MG/DL (ref 1.6–2.6)
MCH RBC QN AUTO: 26.4 PG (ref 26.8–34.3)
MCH RBC QN AUTO: 26.5 PG (ref 26.8–34.3)
MCHC RBC AUTO-ENTMCNC: 30.6 G/DL (ref 31.4–37.4)
MCHC RBC AUTO-ENTMCNC: 31.2 G/DL (ref 31.4–37.4)
MCV RBC AUTO: 85 FL (ref 82–98)
MCV RBC AUTO: 87 FL (ref 82–98)
O2 CT BLDA-SCNC: 19 ML/DL (ref 16–23)
O2 CT BLDA-SCNC: 19.4 ML/DL (ref 16–23)
O2 CT BLDV-SCNC: 11.1 ML/DL
O2 CT BLDV-SCNC: 17.4 ML/DL
OXYHGB MFR BLDA: 95 % (ref 94–97)
OXYHGB MFR BLDA: 96.1 % (ref 94–97)
P AXIS: -4 DEGREES
P AXIS: 38 DEGREES
P AXIS: 40 DEGREES
PCO2 BLDA: 30.1 MM HG (ref 36–44)
PCO2 BLDA: 32.1 MM HG (ref 36–44)
PCO2 BLDV: 37.1 MM HG (ref 42–50)
PCO2 BLDV: 49.8 MM HG (ref 42–50)
PEEP RESPIRATORY: 5 CM[H2O]
PEEP RESPIRATORY: 5 CM[H2O]
PH BLDA: 7.34 [PH] (ref 7.35–7.45)
PH BLDA: 7.37 [PH] (ref 7.35–7.45)
PH BLDV: 7.23 [PH] (ref 7.3–7.4)
PH BLDV: 7.3 [PH] (ref 7.3–7.4)
PHOSPHATE SERPL-MCNC: 4.4 MG/DL (ref 2.7–4.5)
PHOSPHATE SERPL-MCNC: 4.6 MG/DL (ref 2.7–4.5)
PLATELET # BLD AUTO: 64 THOUSANDS/UL (ref 149–390)
PLATELET # BLD AUTO: 81 THOUSANDS/UL (ref 149–390)
PMV BLD AUTO: 12.6 FL (ref 8.9–12.7)
PMV BLD AUTO: 13.3 FL (ref 8.9–12.7)
PO2 BLDA: 83.8 MM HG (ref 75–129)
PO2 BLDA: 92.9 MM HG (ref 75–129)
PO2 BLDV: 31.9 MM HG (ref 35–45)
PO2 BLDV: 53.9 MM HG (ref 35–45)
POTASSIUM SERPL-SCNC: 3.6 MMOL/L (ref 3.5–5.3)
POTASSIUM SERPL-SCNC: 3.7 MMOL/L (ref 3.5–5.3)
POTASSIUM SERPL-SCNC: 3.9 MMOL/L (ref 3.5–5.3)
PR INTERVAL: 156 MS
PR INTERVAL: 176 MS
PROCALCITONIN SERPL-MCNC: 340.67 NG/ML
PROCALCITONIN SERPL-MCNC: 369.91 NG/ML
PROT SERPL-MCNC: 6.1 G/DL (ref 6.4–8.2)
PROTHROMBIN TIME: 13.7 SECONDS (ref 11.8–14.2)
QRS AXIS: 105 DEGREES
QRS AXIS: 125 DEGREES
QRS AXIS: 41 DEGREES
QRS AXIS: 48 DEGREES
QRS AXIS: 55 DEGREES
QRSD INTERVAL: 138 MS
QRSD INTERVAL: 142 MS
QRSD INTERVAL: 144 MS
QRSD INTERVAL: 144 MS
QRSD INTERVAL: 146 MS
QT INTERVAL: 325 MS
QT INTERVAL: 358 MS
QT INTERVAL: 376 MS
QT INTERVAL: 384 MS
QT INTERVAL: 388 MS
QTC INTERVAL: 482 MS
QTC INTERVAL: 492 MS
QTC INTERVAL: 493 MS
QTC INTERVAL: 499 MS
QTC INTERVAL: 554 MS
RBC # BLD AUTO: 5.02 MILLION/UL (ref 3.88–5.62)
RBC # BLD AUTO: 5.23 MILLION/UL (ref 3.88–5.62)
RSV B RNA SPEC QL NAA+PROBE: NORMAL
S PNEUM AG UR QL: NEGATIVE
SODIUM SERPL-SCNC: 138 MMOL/L (ref 136–145)
SODIUM SERPL-SCNC: 140 MMOL/L (ref 136–145)
SODIUM SERPL-SCNC: 142 MMOL/L (ref 136–145)
SPECIMEN SOURCE: ABNORMAL
T WAVE AXIS: -80 DEGREES
T WAVE AXIS: -89 DEGREES
T WAVE AXIS: 165 DEGREES
T WAVE AXIS: 176 DEGREES
T WAVE AXIS: 202 DEGREES
TROPONIN I SERPL-MCNC: 34.9 NG/ML
TROPONIN I SERPL-MCNC: 36.2 NG/ML
TROPONIN I SERPL-MCNC: 36.3 NG/ML
TROPONIN I SERPL-MCNC: >40 NG/ML
TROPONIN I SERPL-MCNC: >40 NG/ML
VENT AC: 20
VENT AC: 20
VENT- AC: AC
VENT- AC: AC
VENTRICULAR RATE: 103 BPM
VENTRICULAR RATE: 117 BPM
VENTRICULAR RATE: 125 BPM
VENTRICULAR RATE: 132 BPM
VENTRICULAR RATE: 97 BPM
VT SETTING VENT: 500 ML
VT SETTING VENT: 500 ML
WBC # BLD AUTO: 12.1 THOUSAND/UL (ref 4.31–10.16)
WBC # BLD AUTO: 12.29 THOUSAND/UL (ref 4.31–10.16)

## 2019-01-24 PROCEDURE — 87077 CULTURE AEROBIC IDENTIFY: CPT | Performed by: PHYSICIAN ASSISTANT

## 2019-01-24 PROCEDURE — 4A133J1 MONITORING OF ARTERIAL PULSE, PERIPHERAL, PERCUTANEOUS APPROACH: ICD-10-PCS | Performed by: INTERNAL MEDICINE

## 2019-01-24 PROCEDURE — 83735 ASSAY OF MAGNESIUM: CPT | Performed by: INTERNAL MEDICINE

## 2019-01-24 PROCEDURE — 87040 BLOOD CULTURE FOR BACTERIA: CPT | Performed by: INTERNAL MEDICINE

## 2019-01-24 PROCEDURE — 87633 RESP VIRUS 12-25 TARGETS: CPT | Performed by: INTERNAL MEDICINE

## 2019-01-24 PROCEDURE — 82805 BLOOD GASES W/O2 SATURATION: CPT | Performed by: PHYSICIAN ASSISTANT

## 2019-01-24 PROCEDURE — 84100 ASSAY OF PHOSPHORUS: CPT | Performed by: NURSE PRACTITIONER

## 2019-01-24 PROCEDURE — 03HY32Z INSERTION OF MONITORING DEVICE INTO UPPER ARTERY, PERCUTANEOUS APPROACH: ICD-10-PCS | Performed by: INTERNAL MEDICINE

## 2019-01-24 PROCEDURE — 84484 ASSAY OF TROPONIN QUANT: CPT | Performed by: NURSE PRACTITIONER

## 2019-01-24 PROCEDURE — 05HM33Z INSERTION OF INFUSION DEVICE INTO RIGHT INTERNAL JUGULAR VEIN, PERCUTANEOUS APPROACH: ICD-10-PCS | Performed by: SURGERY

## 2019-01-24 PROCEDURE — 85730 THROMBOPLASTIN TIME PARTIAL: CPT | Performed by: INTERNAL MEDICINE

## 2019-01-24 PROCEDURE — 85610 PROTHROMBIN TIME: CPT | Performed by: PHYSICIAN ASSISTANT

## 2019-01-24 PROCEDURE — 4A133B1 MONITORING OF ARTERIAL PRESSURE, PERIPHERAL, PERCUTANEOUS APPROACH: ICD-10-PCS | Performed by: INTERNAL MEDICINE

## 2019-01-24 PROCEDURE — 99292 CRITICAL CARE ADDL 30 MIN: CPT | Performed by: INTERNAL MEDICINE

## 2019-01-24 PROCEDURE — 82553 CREATINE MB FRACTION: CPT | Performed by: PHYSICIAN ASSISTANT

## 2019-01-24 PROCEDURE — 83735 ASSAY OF MAGNESIUM: CPT | Performed by: NURSE PRACTITIONER

## 2019-01-24 PROCEDURE — 99291 CRITICAL CARE FIRST HOUR: CPT | Performed by: INTERNAL MEDICINE

## 2019-01-24 PROCEDURE — 86665 EPSTEIN-BARR CAPSID VCA: CPT | Performed by: INTERNAL MEDICINE

## 2019-01-24 PROCEDURE — 94003 VENT MGMT INPAT SUBQ DAY: CPT

## 2019-01-24 PROCEDURE — 84100 ASSAY OF PHOSPHORUS: CPT | Performed by: INTERNAL MEDICINE

## 2019-01-24 PROCEDURE — 86663 EPSTEIN-BARR ANTIBODY: CPT | Performed by: INTERNAL MEDICINE

## 2019-01-24 PROCEDURE — 93005 ELECTROCARDIOGRAM TRACING: CPT

## 2019-01-24 PROCEDURE — 87186 SC STD MICRODIL/AGAR DIL: CPT | Performed by: PHYSICIAN ASSISTANT

## 2019-01-24 PROCEDURE — 71045 X-RAY EXAM CHEST 1 VIEW: CPT

## 2019-01-24 PROCEDURE — 87070 CULTURE OTHR SPECIMN AEROBIC: CPT | Performed by: PHYSICIAN ASSISTANT

## 2019-01-24 PROCEDURE — 99254 IP/OBS CNSLTJ NEW/EST MOD 60: CPT | Performed by: INTERNAL MEDICINE

## 2019-01-24 PROCEDURE — 94760 N-INVAS EAR/PLS OXIMETRY 1: CPT

## 2019-01-24 PROCEDURE — 36556 INSERT NON-TUNNEL CV CATH: CPT | Performed by: INTERNAL MEDICINE

## 2019-01-24 PROCEDURE — 80048 BASIC METABOLIC PNL TOTAL CA: CPT | Performed by: NURSE PRACTITIONER

## 2019-01-24 PROCEDURE — 99253 IP/OBS CNSLTJ NEW/EST LOW 45: CPT | Performed by: INTERNAL MEDICINE

## 2019-01-24 PROCEDURE — 87147 CULTURE TYPE IMMUNOLOGIC: CPT | Performed by: PHYSICIAN ASSISTANT

## 2019-01-24 PROCEDURE — 84145 PROCALCITONIN (PCT): CPT | Performed by: PHYSICIAN ASSISTANT

## 2019-01-24 PROCEDURE — 93010 ELECTROCARDIOGRAM REPORT: CPT | Performed by: INTERNAL MEDICINE

## 2019-01-24 PROCEDURE — 36556 INSERT NON-TUNNEL CV CATH: CPT | Performed by: PHYSICIAN ASSISTANT

## 2019-01-24 PROCEDURE — 36556 INSERT NON-TUNNEL CV CATH: CPT | Performed by: SURGERY

## 2019-01-24 PROCEDURE — 90945 DIALYSIS ONE EVALUATION: CPT

## 2019-01-24 PROCEDURE — 86664 EPSTEIN-BARR NUCLEAR ANTIGEN: CPT | Performed by: INTERNAL MEDICINE

## 2019-01-24 PROCEDURE — 93306 TTE W/DOPPLER COMPLETE: CPT

## 2019-01-24 PROCEDURE — 82550 ASSAY OF CK (CPK): CPT | Performed by: INTERNAL MEDICINE

## 2019-01-24 PROCEDURE — 87205 SMEAR GRAM STAIN: CPT | Performed by: PHYSICIAN ASSISTANT

## 2019-01-24 PROCEDURE — 94002 VENT MGMT INPAT INIT DAY: CPT

## 2019-01-24 PROCEDURE — 85027 COMPLETE CBC AUTOMATED: CPT | Performed by: NURSE PRACTITIONER

## 2019-01-24 PROCEDURE — 82553 CREATINE MB FRACTION: CPT | Performed by: INTERNAL MEDICINE

## 2019-01-24 PROCEDURE — 82550 ASSAY OF CK (CPK): CPT | Performed by: PHYSICIAN ASSISTANT

## 2019-01-24 PROCEDURE — 85730 THROMBOPLASTIN TIME PARTIAL: CPT | Performed by: PHYSICIAN ASSISTANT

## 2019-01-24 PROCEDURE — 85730 THROMBOPLASTIN TIME PARTIAL: CPT | Performed by: NURSE PRACTITIONER

## 2019-01-24 PROCEDURE — 85027 COMPLETE CBC AUTOMATED: CPT | Performed by: PHYSICIAN ASSISTANT

## 2019-01-24 PROCEDURE — 93306 TTE W/DOPPLER COMPLETE: CPT | Performed by: INTERNAL MEDICINE

## 2019-01-24 PROCEDURE — 82805 BLOOD GASES W/O2 SATURATION: CPT | Performed by: INTERNAL MEDICINE

## 2019-01-24 PROCEDURE — 87147 CULTURE TYPE IMMUNOLOGIC: CPT | Performed by: INTERNAL MEDICINE

## 2019-01-24 PROCEDURE — G0427 INPT/ED TELECONSULT70: HCPCS | Performed by: INTERNAL MEDICINE

## 2019-01-24 PROCEDURE — 83605 ASSAY OF LACTIC ACID: CPT | Performed by: PHYSICIAN ASSISTANT

## 2019-01-24 PROCEDURE — 84145 PROCALCITONIN (PCT): CPT | Performed by: NURSE PRACTITIONER

## 2019-01-24 PROCEDURE — 84484 ASSAY OF TROPONIN QUANT: CPT | Performed by: PHYSICIAN ASSISTANT

## 2019-01-24 PROCEDURE — 80074 ACUTE HEPATITIS PANEL: CPT | Performed by: INTERNAL MEDICINE

## 2019-01-24 PROCEDURE — 80053 COMPREHEN METABOLIC PANEL: CPT | Performed by: PHYSICIAN ASSISTANT

## 2019-01-24 PROCEDURE — 02HV33Z INSERTION OF INFUSION DEVICE INTO SUPERIOR VENA CAVA, PERCUTANEOUS APPROACH: ICD-10-PCS | Performed by: INTERNAL MEDICINE

## 2019-01-24 PROCEDURE — 85384 FIBRINOGEN ACTIVITY: CPT | Performed by: PHYSICIAN ASSISTANT

## 2019-01-24 PROCEDURE — 82330 ASSAY OF CALCIUM: CPT | Performed by: NURSE PRACTITIONER

## 2019-01-24 PROCEDURE — 83605 ASSAY OF LACTIC ACID: CPT | Performed by: NURSE PRACTITIONER

## 2019-01-24 RX ORDER — SODIUM CHLORIDE 9 MG/ML
200 INJECTION, SOLUTION INTRAVENOUS CONTINUOUS
Status: DISCONTINUED | OUTPATIENT
Start: 2019-01-24 | End: 2019-01-24

## 2019-01-24 RX ORDER — CEFAZOLIN SODIUM 1 G/50ML
1000 SOLUTION INTRAVENOUS EVERY 12 HOURS
Status: DISCONTINUED | OUTPATIENT
Start: 2019-01-24 | End: 2019-01-24 | Stop reason: HOSPADM

## 2019-01-24 RX ORDER — NYSTATIN 100000 [USP'U]/G
POWDER TOPICAL 2 TIMES DAILY
Status: DISCONTINUED | OUTPATIENT
Start: 2019-01-24 | End: 2019-03-16 | Stop reason: HOSPADM

## 2019-01-24 RX ORDER — FENTANYL CITRATE 50 UG/ML
25 INJECTION, SOLUTION INTRAMUSCULAR; INTRAVENOUS
Status: DISCONTINUED | OUTPATIENT
Start: 2019-01-24 | End: 2019-01-24

## 2019-01-24 RX ORDER — ALBUTEROL SULFATE 2.5 MG/3ML
2.5 SOLUTION RESPIRATORY (INHALATION) EVERY 4 HOURS PRN
Status: DISCONTINUED | OUTPATIENT
Start: 2019-01-24 | End: 2019-01-24 | Stop reason: HOSPADM

## 2019-01-24 RX ORDER — HEPARIN SODIUM 1000 [USP'U]/ML
2000 INJECTION, SOLUTION INTRAVENOUS; SUBCUTANEOUS AS NEEDED
Status: DISCONTINUED | OUTPATIENT
Start: 2019-01-24 | End: 2019-01-24

## 2019-01-24 RX ORDER — HEPARIN SODIUM 10000 [USP'U]/100ML
3-20 INJECTION, SOLUTION INTRAVENOUS
Status: DISCONTINUED | OUTPATIENT
Start: 2019-01-24 | End: 2019-01-24

## 2019-01-24 RX ORDER — ACETAMINOPHEN 160 MG/5ML
650 SUSPENSION, ORAL (FINAL DOSE FORM) ORAL EVERY 6 HOURS PRN
Status: DISCONTINUED | OUTPATIENT
Start: 2019-01-24 | End: 2019-02-07

## 2019-01-24 RX ORDER — ALBUTEROL SULFATE 2.5 MG/3ML
2.5 SOLUTION RESPIRATORY (INHALATION) EVERY 4 HOURS PRN
Status: CANCELLED | OUTPATIENT
Start: 2019-01-24

## 2019-01-24 RX ORDER — DILTIAZEM HYDROCHLORIDE 5 MG/ML
INJECTION INTRAVENOUS
Status: COMPLETED
Start: 2019-01-24 | End: 2019-01-24

## 2019-01-24 RX ORDER — ALBUTEROL SULFATE 2.5 MG/3ML
2.5 SOLUTION RESPIRATORY (INHALATION) EVERY 4 HOURS PRN
Status: DISCONTINUED | OUTPATIENT
Start: 2019-01-24 | End: 2019-02-10

## 2019-01-24 RX ORDER — ASPIRIN 325 MG
325 TABLET, DELAYED RELEASE (ENTERIC COATED) ORAL DAILY
Status: DISCONTINUED | OUTPATIENT
Start: 2019-01-25 | End: 2019-01-25

## 2019-01-24 RX ORDER — HEPARIN SODIUM 1000 [USP'U]/ML
4000 INJECTION, SOLUTION INTRAVENOUS; SUBCUTANEOUS AS NEEDED
Status: CANCELLED | OUTPATIENT
Start: 2019-01-24

## 2019-01-24 RX ORDER — HEPARIN SODIUM 5000 [USP'U]/ML
5000 INJECTION, SOLUTION INTRAVENOUS; SUBCUTANEOUS EVERY 8 HOURS SCHEDULED
Status: DISCONTINUED | OUTPATIENT
Start: 2019-01-24 | End: 2019-01-24

## 2019-01-24 RX ORDER — HEPARIN SODIUM 1000 [USP'U]/ML
4000 INJECTION, SOLUTION INTRAVENOUS; SUBCUTANEOUS AS NEEDED
Status: DISCONTINUED | OUTPATIENT
Start: 2019-01-24 | End: 2019-01-24

## 2019-01-24 RX ORDER — FUROSEMIDE 10 MG/ML
80 INJECTION INTRAMUSCULAR; INTRAVENOUS ONCE
Status: COMPLETED | OUTPATIENT
Start: 2019-01-24 | End: 2019-01-24

## 2019-01-24 RX ORDER — HEPARIN SODIUM 1000 [USP'U]/ML
2000 INJECTION, SOLUTION INTRAVENOUS; SUBCUTANEOUS AS NEEDED
Status: CANCELLED | OUTPATIENT
Start: 2019-01-24

## 2019-01-24 RX ORDER — DILTIAZEM HYDROCHLORIDE 5 MG/ML
10 INJECTION INTRAVENOUS ONCE
Status: COMPLETED | OUTPATIENT
Start: 2019-01-24 | End: 2019-01-24

## 2019-01-24 RX ORDER — MILRINONE LACTATE 0.2 MG/ML
0.13 INJECTION, SOLUTION INTRAVENOUS CONTINUOUS
Status: DISCONTINUED | OUTPATIENT
Start: 2019-01-24 | End: 2019-01-28

## 2019-01-24 RX ORDER — DEXTROSE MONOHYDRATE 50 MG/ML
125 INJECTION, SOLUTION INTRAVENOUS CONTINUOUS
Status: DISCONTINUED | OUTPATIENT
Start: 2019-01-24 | End: 2019-01-24

## 2019-01-24 RX ORDER — CEFAZOLIN SODIUM 1 G/50ML
1000 SOLUTION INTRAVENOUS EVERY 12 HOURS
Status: CANCELLED | OUTPATIENT
Start: 2019-01-24

## 2019-01-24 RX ORDER — FENTANYL CITRATE 50 UG/ML
50 INJECTION, SOLUTION INTRAMUSCULAR; INTRAVENOUS ONCE
Status: COMPLETED | OUTPATIENT
Start: 2019-01-24 | End: 2019-01-24

## 2019-01-24 RX ORDER — ACETAMINOPHEN 650 MG/1
650 SUPPOSITORY RECTAL EVERY 6 HOURS PRN
Status: CANCELLED | OUTPATIENT
Start: 2019-01-24

## 2019-01-24 RX ORDER — HEPARIN SODIUM 10000 [USP'U]/100ML
3-20 INJECTION, SOLUTION INTRAVENOUS
Status: CANCELLED | OUTPATIENT
Start: 2019-01-24

## 2019-01-24 RX ORDER — FENTANYL CITRATE 50 UG/ML
50 INJECTION, SOLUTION INTRAMUSCULAR; INTRAVENOUS
Status: DISCONTINUED | OUTPATIENT
Start: 2019-01-24 | End: 2019-02-01

## 2019-01-24 RX ORDER — CEFAZOLIN SODIUM 2 G/50ML
2000 SOLUTION INTRAVENOUS EVERY 8 HOURS
Status: DISCONTINUED | OUTPATIENT
Start: 2019-01-24 | End: 2019-01-25

## 2019-01-24 RX ORDER — CHLORHEXIDINE GLUCONATE 0.12 MG/ML
15 RINSE ORAL EVERY 12 HOURS SCHEDULED
Status: DISCONTINUED | OUTPATIENT
Start: 2019-01-24 | End: 2019-02-07

## 2019-01-24 RX ORDER — ASPIRIN 325 MG
325 TABLET, DELAYED RELEASE (ENTERIC COATED) ORAL DAILY
Status: CANCELLED | OUTPATIENT
Start: 2019-01-25

## 2019-01-24 RX ORDER — LORAZEPAM 2 MG/ML
0.5 INJECTION INTRAMUSCULAR ONCE
Status: DISCONTINUED | OUTPATIENT
Start: 2019-01-24 | End: 2019-01-24

## 2019-01-24 RX ORDER — ACETAMINOPHEN 650 MG/1
650 SUPPOSITORY RECTAL EVERY 6 HOURS PRN
Status: DISCONTINUED | OUTPATIENT
Start: 2019-01-24 | End: 2019-01-24

## 2019-01-24 RX ADMIN — DILTIAZEM HYDROCHLORIDE 10 MG: 5 INJECTION INTRAVENOUS at 07:16

## 2019-01-24 RX ADMIN — NOREPINEPHRINE BITARTRATE 24 MCG/MIN: 1 INJECTION INTRAVENOUS at 10:22

## 2019-01-24 RX ADMIN — Medication 20000 ML: at 22:30

## 2019-01-24 RX ADMIN — CHLORHEXIDINE GLUCONATE 0.12% ORAL RINSE 15 ML: 1.2 LIQUID ORAL at 20:40

## 2019-01-24 RX ADMIN — CEFAZOLIN SODIUM 2000 MG: 2 SOLUTION INTRAVENOUS at 16:25

## 2019-01-24 RX ADMIN — PERFLUTREN 8 ML/MIN: 6.52 INJECTION, SUSPENSION INTRAVENOUS at 09:06

## 2019-01-24 RX ADMIN — Medication 0.7 MCG/KG/HR: at 10:57

## 2019-01-24 RX ADMIN — VASOPRESSIN 0.04 UNITS/MIN: 20 INJECTION INTRAVENOUS at 15:00

## 2019-01-24 RX ADMIN — DEXMEDETOMIDINE 0.6 MCG/KG/HR: 100 INJECTION, SOLUTION, CONCENTRATE INTRAVENOUS at 22:55

## 2019-01-24 RX ADMIN — SODIUM CHLORIDE 500 ML: 0.9 INJECTION, SOLUTION INTRAVENOUS at 03:25

## 2019-01-24 RX ADMIN — FUROSEMIDE 80 MG: 10 INJECTION, SOLUTION INTRAMUSCULAR; INTRAVENOUS at 20:45

## 2019-01-24 RX ADMIN — DEXMEDETOMIDINE 0.6 MCG/KG/HR: 100 INJECTION, SOLUTION, CONCENTRATE INTRAVENOUS at 20:10

## 2019-01-24 RX ADMIN — NOREPINEPHRINE BITARTRATE 24 MCG/MIN: 1 INJECTION INTRAVENOUS at 15:00

## 2019-01-24 RX ADMIN — ACETAMINOPHEN 650 MG: 160 SUSPENSION ORAL at 19:46

## 2019-01-24 RX ADMIN — VASOPRESSIN 0.04 UNITS/MIN: 20 INJECTION INTRAVENOUS at 10:18

## 2019-01-24 RX ADMIN — MILRINONE LACTATE IN DEXTROSE 0.25 MCG/KG/MIN: 200 INJECTION, SOLUTION INTRAVENOUS at 17:28

## 2019-01-24 RX ADMIN — DEXMEDETOMIDINE 0.4 MCG/KG/HR: 100 INJECTION, SOLUTION, CONCENTRATE INTRAVENOUS at 15:00

## 2019-01-24 RX ADMIN — SODIUM CHLORIDE 1000 ML: 0.9 INJECTION, SOLUTION INTRAVENOUS at 03:25

## 2019-01-24 RX ADMIN — HEPARIN SODIUM 2000 UNITS: 1000 INJECTION, SOLUTION INTRAVENOUS; SUBCUTANEOUS at 08:46

## 2019-01-24 RX ADMIN — Medication 0.6 MCG/KG/HR: at 06:10

## 2019-01-24 RX ADMIN — SODIUM BICARBONATE 100 ML/HR: 84 INJECTION, SOLUTION INTRAVENOUS at 19:23

## 2019-01-24 RX ADMIN — NOREPINEPHRINE BITARTRATE 17 MCG/MIN: 1 INJECTION INTRAVENOUS at 22:25

## 2019-01-24 RX ADMIN — SODIUM BICARBONATE 100 ML/HR: 84 INJECTION, SOLUTION INTRAVENOUS at 18:30

## 2019-01-24 RX ADMIN — SODIUM CHLORIDE 150 ML/HR: 0.9 INJECTION, SOLUTION INTRAVENOUS at 02:18

## 2019-01-24 RX ADMIN — HEPARIN SODIUM 2000 UNITS: 1000 INJECTION, SOLUTION INTRAVENOUS; SUBCUTANEOUS at 01:37

## 2019-01-24 RX ADMIN — Medication 0.2 MCG/KG/HR: at 02:06

## 2019-01-24 RX ADMIN — HYDROCORTISONE SODIUM SUCCINATE 100 MG: 100 INJECTION, POWDER, FOR SOLUTION INTRAMUSCULAR; INTRAVENOUS at 19:04

## 2019-01-24 RX ADMIN — NOREPINEPHRINE BITARTRATE 5 MCG/MIN: 1 INJECTION INTRAVENOUS at 03:39

## 2019-01-24 RX ADMIN — Medication 0.7 MCG/KG/HR: at 08:28

## 2019-01-24 RX ADMIN — METRONIDAZOLE 500 MG: 500 INJECTION, SOLUTION INTRAVENOUS at 17:21

## 2019-01-24 RX ADMIN — FENTANYL CITRATE 25 MCG: 50 INJECTION INTRAMUSCULAR; INTRAVENOUS at 19:20

## 2019-01-24 RX ADMIN — NOREPINEPHRINE BITARTRATE 21 MCG/MIN: 1 INJECTION INTRAVENOUS at 07:19

## 2019-01-24 RX ADMIN — METRONIDAZOLE 500 MG: 500 INJECTION, SOLUTION INTRAVENOUS at 23:15

## 2019-01-24 RX ADMIN — FENTANYL CITRATE 50 MCG: 50 INJECTION INTRAMUSCULAR; INTRAVENOUS at 02:52

## 2019-01-24 RX ADMIN — SODIUM CHLORIDE 200 ML/HR: 0.9 INJECTION, SOLUTION INTRAVENOUS at 10:22

## 2019-01-24 RX ADMIN — THIAMINE HYDROCHLORIDE 200 MG: 100 INJECTION, SOLUTION INTRAMUSCULAR; INTRAVENOUS at 09:21

## 2019-01-24 RX ADMIN — Medication 0.6 MCG/KG/HR: at 03:36

## 2019-01-24 RX ADMIN — VANCOMYCIN HYDROCHLORIDE 2000 MG: 1 INJECTION, POWDER, LYOPHILIZED, FOR SOLUTION INTRAVENOUS at 18:00

## 2019-01-24 RX ADMIN — NYSTATIN: 100000 POWDER TOPICAL at 17:22

## 2019-01-24 RX ADMIN — SODIUM BICARBONATE 50 MEQ: 84 INJECTION, SOLUTION INTRAVENOUS at 05:32

## 2019-01-24 RX ADMIN — NOREPINEPHRINE BITARTRATE 20 MCG/MIN: 1 INJECTION INTRAVENOUS at 18:03

## 2019-01-24 RX ADMIN — ACETAMINOPHEN 650 MG: 650 SUPPOSITORY RECTAL at 06:52

## 2019-01-24 RX ADMIN — CALCIUM CHLORIDE 1 G: 100 INJECTION, SOLUTION INTRAVENOUS; INTRAVENTRICULAR at 19:22

## 2019-01-24 RX ADMIN — FENTANYL CITRATE 50 MCG: 50 INJECTION, SOLUTION INTRAMUSCULAR; INTRAVENOUS at 20:21

## 2019-01-24 RX ADMIN — SODIUM CHLORIDE 500 ML: 0.9 INJECTION, SOLUTION INTRAVENOUS at 00:59

## 2019-01-24 NOTE — H&P
H&P- Yulissa Riana 1960, 60 y o  male MRN: [id number]    Unit/Bed#:  Encounter: [id number]    Primary Care Provider: Matt Persaud DO   Date and time admitted to hospital: 1/23/2019  3:26 PM        PATRICK (acute kidney injury) [facility])   Assessment & Plan    Creatinine currently elevated at 2 29 baseline 0 98  Trend BMP  Provide Nephro toxic agents  Folding prior to admission Demadex     NSTEMI (non-ST elevated myocardial infarction) [facility])   Assessment & Plan    NSTEMI type 2  Troponin elevated 14 20 trend troponin with EKG  EKG sinus tachycardia with a left bundle-branch block-consider Cardizem drip  Heparin drip infusing per protocol  Cardiology consulted       Acute encephalopathy   Assessment & Plan    Acute confusion prior to intubation  Most likely secondary to sepsis  Ct Head Without Contrast-- 1/23/2019-"Impression: Area of low density/diminished gray-white distinction in the inferior right frontal lobe which is probably artifactual, however, 12 hour follow-up reassessment suggested as this could be subacute ischemia  No hemorrhage  Dilated intraorbital vessels bilaterally perhaps venous varicosities  No gross evidence of pathology of the cavernous sinus "  Ammonia level - WDL       Sepsis (HCC)   Assessment & Plan    Elevated lactic acid  WBC 16 01  Blood cultures pending  Urine cultures pending  Urine antigens ordered  Flu swab sent  Vancomycin and cefepime ordered  Tamiflu ordered  Admit to ICU-continuous cardiopulmonary monitoring      Essential hypertension   Assessment & Plan    Blood pressure currently stable  Monitor per protocol  Admitted to the ICU  Holding prior to admission medications           VTE Prophylaxis: Heparin Drip  / sequential compression device   Code Status: FULL  POLST: POLST is not applicable to this patient    Anticipated Length of Stay:  Patient will be admitted on an Inpatient basis with an anticipated length of stay of  Greater than  2 midnights     Justification for Hospital Stay:  Sepsis, acute encephalopathy, non STEMI, PATRICK and respiratory distress requiring mechanical ventilation    Total Time for Visit, including Counseling / Coordination of Care: 1 hour  Greater than 50% of this total time spent on direct patient counseling and coordination of care  Chief Complaint:   Chest pain    History of Present Illness:    Mervat Galloway is a 60 y o  male who presents via EMS for evaluation of chest pain that began Tuesday  Chart indicates that when EMS arrived the patient was in SVT and was given adenosine broke to sinus tachycardia  Upon my assessment of the patient he was intubated and sedated and no family present at bedside the following HPI was gathered from information provided by chart review  Emergency per room patient continued to have chest pain repeat EKGs showed no ST changes just lunch left bundle branch block  Patient was given 1 nitro sublingual   Patient was hypoxic and hypoxia improved with non-rebreather  Patient was experience shortness of breath  Patient's mental status was not at his base line and was thought to be from Respiratory distress, ultimately through the process patient was intubated and was mechanically ventilated  Blood cultures urine cultures empiric antibiotics were initiated fluid boluses were initiated  Patient did have a history of aortic valve repair in 2014  Images and labs were collected in the emergency room patient was found to have leukocytosis and elevated troponin  Heparin drip was initiated in the emergency room  Last stress test was in 2014 prior to valve replacement  Patient was admitted by emergency room doctor  Patient will be admitted to the ICU          Review of Systems:    Review of Systems   Unable to perform ROS: Acuity of condition       Past Medical and Surgical History:     Past Medical History:   Diagnosis Date    Allergic rhinitis     last assessed 9/12/12    Finger fracture, right     Closed fx of the middle phalanx of the right 5th finger  last assessed 1/30/14    Hemorrhoids     last assessed 2/10/14    Hyperlipidemia     Hypertension        Past Surgical History:   Procedure Laterality Date    AORTIC VALVE REPLACEMENT  07/30/2014    AVR with 25mm [facility] Ease bovine pericardial valve    CARDIAC CATHETERIZATION  07/18/2014    SLB left main-normal, circumflex-normal, ramus intermedius-normal, RCA was large and dominant giving rise to the PDA & a large posterolateral branch  No disease  last assessed 8/19/14     KNEE CARTILAGE SURGERY Left     medial meniscus tear     TESTICLE SURGERY      TONSILLECTOMY AND ADENOIDECTOMY      TOOTH EXTRACTION         Meds/Allergies:    Prior to Admission medications    Medication Sig Start Date End Date Taking? Authorizing Provider   amLODIPine (NORVASC) 5 mg tablet Take 1 tablet (5 mg total) by mouth daily 11/16/18  Yes Steve Gupta MD   ascorbic acid (VITAMIN C) 500 MG tablet Take 1 tablet by mouth 2 (two) times a day   Yes Historical Provider, MD   aspirin (ECOTRIN) 325 mg EC tablet Take 1 tablet (325 mg total) by mouth daily 11/16/18  Yes Steve Gupta MD   fluticasone (FLONASE) 50 mcg/act nasal spray 1 spray into each nostril 2 (two) times a day 2/19/15  Yes Historical Provider, MD   loratadine (CLARITIN) 10 mg tablet Take 1 tablet by mouth   Yes Historical Provider, MD   metoprolol tartrate (LOPRESSOR) 100 mg tablet Take 1 tablet (100 mg total) by mouth 2 (two) times a day 11/16/18  Yes Steve Gupta MD   potassium chloride (K-DUR,KLOR-CON) 20 mEq tablet Take 1 tablet (20 mEq total) by mouth daily 11/16/18  Yes Steve Gupta MD   pravastatin (PRAVACHOL) 40 mg tablet Take 1 tablet (40 mg total) by mouth daily at bedtime 11/16/18  Yes Steve Gupta MD   torsemide (DEMADEX) 20 mg tablet Take 1 tablet (20 mg total) by mouth daily 11/16/18  Yes Steve Gupta MD     I have reviewed home medications using allscripts  Allergies:    Allergies   Allergen Reactions    Penicillins Rash       Social History:     Marital Status: /Civil Union   Occupation:   Patient Pre-hospital Living Situation: independent  Patient Pre-hospital Level of Mobility: independent  Patient Pre-hospital Diet Restrictions: none  Substance Use History:   History   Alcohol Use No     Comment: ocassion /never drank alcohol per Allscripts      History   Smoking Status    Never Smoker   Smokeless Tobacco    Never Used     History   Drug Use No       Family History:    Family History   Problem Relation Age of Onset    Lung cancer Mother     Heart disease Mother         pacemaker placement     Coronary artery disease Father     Hypertension Father     Heart attack Father     Cancer Family         bladder        Physical Exam:     Vitals:   Blood Pressure: 91/56 (01/23/19 2253)  Pulse: (!) 125 (01/23/19 2253)  Temperature: (!) 103 1 °F (39 5 °C) (01/23/19 2253)  Temp Source: Tympanic (01/23/19 2253)  Respirations: (!) 26 (01/23/19 2253)  Weight - Scale: (!) 154 kg (338 lb 10 oz) (01/23/19 1533)  SpO2: 98 % (01/23/19 2253)    Physical Exam   Constitutional: He appears well-developed and well-nourished  He has a sickly appearance  HENT:   Head: Normocephalic and atraumatic  Eyes: Pupils are equal, round, and reactive to light  Right eye exhibits no discharge  Left eye exhibits no discharge  No scleral icterus  Neck: Normal range of motion  Neck supple  No JVD present  No tracheal deviation present  No thyromegaly present  Cardiovascular: S1 normal and S2 normal   Tachycardia present  Exam reveals no gallop, no distant heart sounds and no friction rub  Pulmonary/Chest: No stridor  He is in respiratory distress  Abdominal: Soft  Bowel sounds are normal  He exhibits no distension  There is no tenderness  There is no rebound  Musculoskeletal: He exhibits edema  He exhibits no tenderness or deformity     Neurological:   Patient is sedated and intubated unable to assess fully   Skin: Skin is warm and dry  Psychiatric:   Patient is sedated intubated unable to fully assess right now           Additional Data:     Lab Results: I have personally reviewed pertinent reports  Results from last 7 days  Lab Units 01/23/19  2303 01/23/19  1600   WBC Thousand/uL 12 27* 16 01*   HEMOGLOBIN g/dL 14 4 14 6   HEMATOCRIT % 46 4 47 0   PLATELETS Thousands/uL 83* 112*   NEUTROS PCT %  --  94*   LYMPHS PCT %  --  2*   MONOS PCT %  --  3*   EOS PCT %  --  0       Results from last 7 days  Lab Units 01/23/19  2251  01/23/19  1600   POTASSIUM mmol/L 4 0  < > 3 9   CHLORIDE mmol/L 103  < > 99*   CO2 mmol/L 21  < > 25   BUN mg/dL 41*  < > 34*   CREATININE mg/dL 2 87*  < > 2 63*   CALCIUM mg/dL 7 4*  < > 8 0*   ALK PHOS U/L  --   --  89   ALT U/L  --   --  90*   AST U/L  --   --  242*   < > = values in this interval not displayed  Results from last 7 days  Lab Units 01/23/19  1600   INR  1 45*       Imaging: I have personally reviewed pertinent reports  Ct Chest Abdomen Pelvis Wo Contrast    Result Date: 1/23/2019  Narrative: CT CHEST, ABDOMEN AND PELVIS WITHOUT IV CONTRAST INDICATION:   Sepsis  COMPARISON:  None  TECHNIQUE: CT examination of the chest, abdomen and pelvis was performed without intravenous contrast   Axial, sagittal, and coronal 2D reformatted images were created from the source data and submitted for interpretation  Radiation dose length product (DLP) for this visit:  2085 mGy-cm   This examination, like all CT scans performed in the [facility], was performed utilizing techniques to minimize radiation dose exposure, including the use of iterative reconstruction and automated exposure control  Enteric contrast was administered  FINDINGS: CHEST LUNGS:  Patchy consolidative changes seen at the bilateral lung bases, consistent with atelectasis although infiltrate would have to be excluded clinically    Additional segmental atelectasis along the right major fissure     There is no tracheal or endobronchial lesion  PLEURA:  Unremarkable  HEART/GREAT VESSELS:  Atherosclerotic changes are noted in thoracic aorta noted  Status post valve replacement  MEDIASTINUM AND JAMAR:  Unremarkable  CHEST WALL AND LOWER NECK:   Unremarkable  ABDOMEN LIVER/BILIARY TREE:  Indeterminate 3 3 cm hypodensity in the left hepatic lobe  Additional vague 1 6 cm hypodense focus more inferiorly within the left hepatic lobe  GALLBLADDER:  No calcified gallstones  No pericholecystic inflammatory change  SPLEEN:  Unremarkable  PANCREAS:  Unremarkable  ADRENAL GLANDS:  Unremarkable  KIDNEYS/URETERS:  One or more simple renal cyst(s) is noted  Otherwise unremarkable kidneys  No hydronephrosis  STOMACH AND BOWEL:  Unremarkable  APPENDIX:  No findings to suggest appendicitis  ABDOMINOPELVIC CAVITY:  No ascites or free intraperitoneal air  No lymphadenopathy  VESSELS:  Atherosclerotic changes are present  No evidence of aneurysm  PELVIS REPRODUCTIVE ORGANS:  Unremarkable for patient's age  URINARY BLADDER:  Turner catheter is in place, which limits evaluation    ABDOMINAL WALL/INGUINAL REGIONS:  Unremarkable  OSSEOUS STRUCTURES:  No acute fracture or destructive osseous lesion  Impression: Patchy consolidative changes at the bilateral lung bases, likely secondary to atelectasis, or alternatively, in the appropriate clinical context, acute infiltrate, possibly secondary to aspiration Workstation performed: [id number]     Xr Chest 1 View Portable    Result Date: 1/23/2019  Narrative: CHEST INDICATION:   Chest pain, shortness of breath, and SVT  COMPARISON:  10/31/2014 EXAM PERFORMED/VIEWS:  XR CHEST PORTABLE FINDINGS:  Intact median sternotomy wires without dehiscence  Dilated cardiomyopathy  Pulmonary vascular engorgement and cephalization  Patchy central opacities suggesting alveolar edema  No pneumothorax or effusion  Bones are unremarkable       Impression: Findings of volume overload including pulmonary vascular congestion and mild alveolar edema  No effusions demonstrated  Workstation performed: [id number]     Ct Head Without Contrast    Result Date: 1/23/2019  Narrative: CT BRAIN - WITHOUT CONTRAST INDICATION:   abnormal mental status  COMPARISON:  None  TECHNIQUE:  CT examination of the brain was performed  In addition to axial images, coronal 2D reformatted images were created and submitted for interpretation  Radiation dose length product (DLP) for this visit:  966 93 mGy-cm   This examination, like all CT scans performed in the [facility], was performed utilizing techniques to minimize radiation dose exposure, including the use of iterative  reconstruction and automated exposure control  IMAGE QUALITY:  Image quality is limited through the posterior fossa  FINDINGS: PARENCHYMA:  There is no visible parenchymal mass or hemorrhage or midline shift  In the right frontal lobe, inferiorly, on image 27 series 2 there seems to be an area of diminished distinction between gray matter and white matter which might signify an  area of subacute ischemia, however, in this location it might simply be artifact  Suggest short interval follow-up in about 12 hours for reassessment  Overall, the finding is favored to be an artifact  An MRI, if possible, would presumably be much more definitive  VENTRICLES AND EXTRA-AXIAL SPACES:  Normal for the patient's age  VISUALIZED ORBITS AND PARANASAL SINUSES:  There is prominence of the intraorbital vessels bilaterally  These may simply be varicosities  There does not appear to be any evidence of acute pathology in the cavernous sinus  Sinuses are clear  CALVARIUM AND EXTRACRANIAL SOFT TISSUES:  No acute findings  Small amount of right mastoid fluid noted incidentally       Impression: Area of low density/diminished gray-white distinction in the inferior right frontal lobe which is probably artifactual, however, 12 hour follow-up reassessment suggested as this could be subacute ischemia  No hemorrhage  Dilated intraorbital vessels bilaterally perhaps venous varicosities  No gross evidence of pathology of the cavernous sinus  Workstation performed: [id number]       EKG, Pathology, and Other Studies Reviewed on Admission:   · EKG: reviewed    Allscripts / Epic Records Reviewed: Yes     ** Please Note: This note has been constructed using a voice recognition system   **

## 2019-01-24 NOTE — ED PROCEDURE NOTE
Procedure  Intubation  Date/Time: 1/23/2019 7:51 PM  Performed by: Joanne Bonds  Authorized by: Joanne Bodns     Patient location:  ED  Other Assisting Provider: Yes (comment) (dr swartz present at bedside for procedure)    Consent:     Consent obtained:  Verbal    Consent given by:  Patient and spouse    Risks discussed:  Aspiration, bleeding, brain injury, death and hypoxia    Alternatives discussed:  Alternative treatment (pt not responding/improving on alternative tx including supplemental oxygen and vapotherm hi/flow)  Universal protocol:     Procedure explained and questions answered to patient or proxy's satisfaction: yes      Patient identity confirmed:  Verbally with patient and arm band  Pre-procedure details:     Patient status:  Altered mental status    Mallampati score:  3    Pretreatment medications:  Etomidate    Paralytics:  Succinylcholine  Procedure details:     Preoxygenation:  Nonrebreather mask    CPR in progress: no      Intubation method:  Oral    Oral intubation technique:  Glidescope    Laryngoscope blade: Mac 4    Tube size (mm):  7 5    Tube type:  Cuffed    Number of attempts:  1    Cricoid pressure: no      Tube visualized through cords: yes    Placement assessment:     ETT to lip:  24cm    Tube secured with:  ETT rosales    Breath sounds:  Equal and absent over the epigastrium    Placement verification: chest rise, condensation, CXR verification, direct visualization, equal breath sounds and ETCO2 detector      CXR findings:  ETT in proper place    Ventilator settings:  18/500/100%/7 peep  Post-procedure details:     Patient tolerance of procedure:   Tolerated well, no immediate complications                     Mraleni Gutiérrez PA-C  01/23/19 [id number]

## 2019-01-24 NOTE — ASSESSMENT & PLAN NOTE
Possible component of ATN, patient also with markedly elevated CPK level consistent with non traumatic rhabdomyolysis

## 2019-01-24 NOTE — PROGRESS NOTES
Norepinephrine drip initiated at 5 mcg per/min per verbal order from [address]  Continued titrating per order until 0402  Pt BP dropped with a MAP in the 50's  Per verbal order from [address] made the following changes to drip rate: at 0402 increased drip rate to 30 mcg/min, at  0408 decreased to 10 mcg/min, at 0414 increased drip rate to 15 mcg/min, at 0423 increased drip rate to 20 mcg/min  Subsequent titrations administered per order in [facility]

## 2019-01-24 NOTE — ASSESSMENT & PLAN NOTE
Patient developed increasing confusion, increased work of breathing in the emergency room and was intubated yesterday evening on 1/23/2019  Continue ventilator support at current settings

## 2019-01-24 NOTE — SOCIAL WORK
Pt is going to be transferred to [facility] to the Service of Dr Keegan Desai  Family was notified by physician

## 2019-01-24 NOTE — ASSESSMENT & PLAN NOTE
Creatinine currently elevated at 2 29 baseline 0 98  Trend BMP  Provide Nephro toxic agents  Folding prior to admission Demadex

## 2019-01-24 NOTE — PROGRESS NOTES
Vancomycin Assessment    Veronica Burkett is a 60 y o  male who is currently receiving vancomycin 2000mg IV Q24H for MRSA suspected, bacteremia     Relevant clinical data and objective history reviewed:  Creatinine   Date Value Ref Range Status   01/24/2019 3 70 (H) 0 60 - 1 30 mg/dL Final     Comment:     Standardized to IDMS reference method   01/24/2019 3 06 (H) 0 60 - 1 30 mg/dL Final     Comment:     Standardized to IDMS reference method   01/23/2019 2 87 (H) 0 60 - 1 30 mg/dL Final     Comment:     Standardized to IDMS reference method   04/09/2015 0 67 0 60 - 1 30 mg/dL Final     Comment:     Standardized to IDMS reference method   11/15/2014 0 70 0 60 - 1 30 mg/dL Final     Comment:     Standardized to IDMS reference method   11/02/2014 0 86 0 60 - 1 30 mg/dL Final     Comment:     Standardized to IDMS reference method     SpO2 98%   No intake/output data recorded  Lab Results   Component Value Date/Time    BUN 45 (H) 01/24/2019 05:02 AM    BUN 17 04/09/2015 11:41 AM    WBC 12 10 (H) 01/24/2019 05:02 AM    WBC 6 71 11/02/2014 05:56 AM    HGB 13 3 01/24/2019 05:02 AM    HGB 11 9 (L) 11/02/2014 05:56 AM    HCT 43 4 01/24/2019 05:02 AM    HCT 39 3 11/02/2014 05:56 AM    MCV 87 01/24/2019 05:02 AM    MCV 81 (L) 11/02/2014 05:56 AM    PLT 81 (L) 01/24/2019 05:02 AM     11/02/2014 05:56 AM     Temp Readings from Last 3 Encounters:   01/24/19 99 8 °F (37 7 °C) (Tympanic)   06/23/15 97 8 °F (36 6 °C)   02/19/15 97 8 °F (36 6 °C)     Vancomycin Days of Therapy: 2    Assessment/Plan  The patient is currently on vancomycin utilizing scheduled dosing based on adjusted body weight (due to obesity)  Baseline risks associated with therapy include: PATRICK  Patient has PATRICK (Baseline Scr 0 98)  The patient is currently receiving 2000mg IV Q24H and is clinically appropriate and dose will be continued          Pharmacy will also follow closely for s/sx of nephrotoxicity, infusion reactions and appropriateness of therapy  BMP and CBC will be ordered per protocol  Plan for trough as patient approaches steady state, prior to the 3rd  dose tomorrow, 1/25/19 (received 1500mg last night x 1 dose)  at approximately 15:30 due to poor renal function  If level >20 will hold dose  Due to infection severity, will target a trough of 15-20 (appropriate for most indications)   Pharmacy will continue to follow the patients culture results and clinical progress daily      Suresh Heard, Pharmacist

## 2019-01-24 NOTE — ASSESSMENT & PLAN NOTE
Blood pressure currently stable  Monitor per protocol  Admitted to the ICU  Holding prior to admission medications

## 2019-01-24 NOTE — PROCEDURES
Ultrasound guided placement of right internal jugular triple-lumen catheter  Verbal consent was obtained from the patient's significant other Sherin Ho via telephone, please refer to medical chart for documentation  Ultrasound guidance was used to obtain access into the right internal jugular vein, triple-lumen catheter placed after 1 needle stick, good blood return from all 3 ports, all 3 ports flushed  Maximal sterile precautions were followed during placement  Postprocedure chest x-ray pending  Minimal blood loss  No complications

## 2019-01-24 NOTE — ASSESSMENT & PLAN NOTE
Acute confusion prior to intubation    Ct Head Without Contrast-- 1/23/2019-"Impression: Area of low density/diminished gray-white distinction in the inferior right frontal lobe which is probably artifactual, however, 12 hour follow-up reassessment suggested as this could be subacute ischemia  No hemorrhage  Dilated intraorbital vessels bilaterally perhaps venous varicosities    No gross evidence of pathology of the cavernous sinus "  Ammonia level is normal

## 2019-01-24 NOTE — CONSULTS
Consultation - Cardiology   Lilia Pace 60 y o  male MRN: [id number]  [facility] Encounter: [id number]      Assessment:  Active Problems:    * No active hospital problems  *      Plan:  1  HFrEF- LVEF-30%, LVIDd-6 5 cm- Cardiomyopathy in the setting of Septic shock with possible component of cardiogenic shock  - Clinically does not appear to be hypervolemic and is cold  - SVO2- on VBG at [address]  5- will repeat one here and if still low- start Milrinone-0 25 mcg/kg/min  - on levophed and vasopressin for sepsis  - Echo reviewed- Upper normal LV size with LVEF-30%, Concentric hypertrophy, Dilated RV with reduced function, Dyssynergic septum  Bioprosthetic aortic valve- not very well visualized- mean gradient of 13  - His cardiomyopathy is predominantly sepsis mediated myocardial dysfunction    2  Septic shock- with gram positive bacteremia  - On broad spectrum antibiotics- Cefazolin, Flagyl and Vancomycin  - CT chest reviewed- basilar consolidative lung changes not impressive to explain the degree of his septic shock and he is not high requirements on the vent itself  - He does have coin shaped excoriations on the upper back- wife reports scratching his upper back- possible seeding from this to the aortic valve  No recent dental work or respiratory workup or recent travel  - Will get a CHON tomorrow if stable enough to evaluate for bioprosthetic aortic valve endocarditis  - On Vasopressin and Levophed to maintain MAP>65    3  Bioprosthetic AVR for severe degenerative AS- 25 mm Champagne Magna in July 2014  - Gradients across the valve in the past have been around 21, current gradients are 13- CHON to evaluate for endocarditis    4  Troponin elevation  - Troponins elevated to above 40, this is in the setting of septic shock  - EKG with no ischemic changes  - Normal cath in 2014  - This is not a NSTEMI- discontinue IV heparin  Platelet count is also low- with ongoing Sepsis    5   PATRICK- in the setting of septic shock    6  Transaminitis      History of Present Illness   Physician Requesting Consult: Josselyn Page DO  Reason for Consult / Principal Problem: Cardiomyopathy  HPI: Delfino Raymond is a 60y o  year old male with severe aortic stenosis (biopsy-degenerative) s/p Bioprosthetic AVR with 25 mm Deitra Baptise in July 2014 with post op pericardial effusion requiring drainage in October 2014, Hypertension, Dyslipidemia  Patient was in his usual state of health, was bought in by ambulance yesterday evening for symptoms of shortness of breath, fevers and chest pain  EMS at home found him cold, SpO2-80% on RA and tachycardic- given 6mg Adenosine with repeat sinus tachycardia in 120s  Put on NRB and transported to the ED at [facility]  Speaking to his wife, he has been doing well recently- reports she though he appeared to have a fever in the past two days  No symptoms of chest pain or pressure or shortness of breath in the past few days that she could recall of  No recent travels or sick contacts  No recent dental procedures  Patient with excoriations in the back- wife reports she scratches him at times  In the ED at [facility], he was given IV fluids, broad spectrum antibiotics, one time dose of Cardizem, Troponins were elevated at 14 and was also started on heparin drip  He quickly decompensated and was intubated in the ED and became hypotensive- started on pressors   Echo at [facility] showed depressed LVEF at 30%, blood cultures with gram positive cocci and patient was transferred      Outpatient Cardiologist- Dr Sol Mcguire    Cath- July 2014- Normal coronaries      Multiple EKGs since yesterday reviewed- Sinus tachycardia with LBBB (old), no Sgarbossa criteria       Inpatient consult to Cardiology     Performed by  Julisa Donis by Alma ROBIN              Review of Systems:  Review of Systems   Unable to perform ROS: Intubated       14 systems reviewed and negative with the exception of the above and the following    Historical Information   Past Medical History:   Diagnosis Date    Allergic rhinitis     last assessed 9/12/12    Finger fracture, right     Closed fx of the middle phalanx of the right 5th finger  last assessed 1/30/14    Hemorrhoids     last assessed 2/10/14    Hyperlipidemia     Hypertension      Past Surgical History:   Procedure Laterality Date    AORTIC VALVE REPLACEMENT  07/30/2014    AVR with 25mm [facility] Ease bovine pericardial valve    CARDIAC CATHETERIZATION  07/18/2014    SLB left main-normal, circumflex-normal, ramus intermedius-normal, RCA was large and dominant giving rise to the PDA & a large posterolateral branch  No disease  last assessed 8/19/14     KNEE CARTILAGE SURGERY Left     medial meniscus tear     TESTICLE SURGERY      TONSILLECTOMY AND ADENOIDECTOMY      TOOTH EXTRACTION       History   Alcohol Use No     Comment: ocassion /never drank alcohol per [employer]      History   Drug Use No     History   Smoking Status    Never Smoker   Smokeless Tobacco    Never Used     Family History: non-contributory    Meds/Allergies   all current active meds have been reviewed  Allergies   Allergen Reactions    Penicillins Rash       Objective   Vitals: SpO2 98 %  , There is no height or weight on file to calculate BMI ,     No intake or output data in the 24 hours ending 01/24/19 1544    Invasive Devices     Central Venous Catheter Line            CVC Central Lines 01/24/19 Triple Left Internal jugular less than 1 day          Peripheral Intravenous Line            Peripheral IV 01/23/19 Left Antecubital less than 1 day    Peripheral IV 01/23/19 Right Antecubital less than 1 day    Peripheral IV 01/24/19 Right Hand less than 1 day          Drain            NG/OG/Enteral Tube Orogastric 14 Fr Right mouth less than 1 day    Urethral Catheter Latex less than 1 day          Airway            ETT  Cuffed 7 5 mm less than 1 day                    Physical Exam:  Physical Exam   Constitutional:   Obese  Intubated and sedated   HENT:   Head: Normocephalic and atraumatic  Eyes: Conjunctivae are normal  No scleral icterus  Pupils- small, reactive    Neck: Neck supple  No JVD present  Cardiovascular: Normal rate, regular rhythm, normal heart sounds and intact distal pulses  Exam reveals no gallop and no friction rub  No murmur heard  Pulmonary/Chest: He has no wheezes  He has rales  Intubated, limited anterior exam  Diminished at the bases   Abdominal: Soft  Bowel sounds are normal  There is no tenderness  Musculoskeletal: He exhibits no edema     Bluish discoloration of the toes   Skin:   Cold upper and lower extremities  Multiple (6-8) coin shaped excoriations on the upper back           Lab Results:     Lab Results   Component Value Date    CKTOTAL 60,717 (H) 01/24/2019    CKMB 341 2 (H) 01/24/2019    CKMBINDEX <1 0 01/24/2019    TROPONINI >40 00 (HH) 01/24/2019    TROPONINI >40 00 (HH) 01/24/2019    TROPONINI 36 61 (HH) 01/23/2019       Lab Results   Component Value Date    GLUCOSE 92 04/09/2015    CALCIUM 7 0 (L) 01/24/2019     04/09/2015    K 3 7 01/24/2019    CO2 22 01/24/2019     01/24/2019    BUN 45 (H) 01/24/2019    CREATININE 3 70 (H) 01/24/2019       Lab Results   Component Value Date    WBC 12 10 (H) 01/24/2019    HGB 13 3 01/24/2019    HCT 43 4 01/24/2019    MCV 87 01/24/2019    PLT 81 (L) 01/24/2019       Lab Results   Component Value Date    CHOL 146 07/18/2014    CHOL 145 02/21/2014     Lab Results   Component Value Date    HDL 41 06/02/2018    HDL 52 06/27/2017    HDL 51 07/18/2014     Lab Results   Component Value Date    LDLCALC 57 06/02/2018    LDLCALC 129 (H) 06/27/2017    LDLCALC 81 07/18/2014     Lab Results   Component Value Date    TRIG 102 06/02/2018    TRIG 71 06/27/2017    TRIG 70 07/18/2014       Lab Results   Component Value Date    ALT 90 (H) 01/23/2019     (H) 01/23/2019         Results from last 7 days  Lab Units 19  1600   INR  1 45*       Cardiac testing:   Results for orders placed during the hospital encounter of 19   Echo complete with contrast if indicated    Narrative [address]  [address]  [phone]    Transthoracic Echocardiogram  2D, Doppler, and Color Doppler    Study date:  2019    Patient: Arcadio Villanueva  MR number: [id number]  Account number: [de-identified]  : 1960  Age: 60 years  Gender: Male  Status: Inpatient  Location: Bedside  Height: 72 in  Weight: 339 lb  BP: 89/ 54 mmHg    Indications: chest pain    Diagnoses: R07 9 - Chest pain, unspecified    Sonographer:  Natalee Dueñas RDCS, CCT  Primary Physician:  Erma Marrero DO  Referring Physician:  Maurisio Jimenez DO  Group:  [id number]  Interpreting Physician:  Vamshi Cool DO    SUMMARY    PROCEDURE INFORMATION:  This was a technically difficult study despite the administration of echo contrast     LEFT VENTRICLE:  Systolic function was markedly reduced  Ejection fraction was estimated to be 30 %  There was severe diffuse hypokinesis with no obvious identifiable regional variations  Wall thickness was moderately increased  Concentric hypertrophy was present  VENTRICULAR SEPTUM:  There was dyssynergic motion  RIGHT VENTRICLE:  The ventricle was mildly dilated  Systolic function was moderately to markedly reduced  LEFT ATRIUM:  The atrium was mildly dilated  RIGHT ATRIUM:  The atrium was mildly dilated  AORTIC VALVE:  A bioprosthesis was present  It was not well visualized  Mean transvalvular gradient 13 mmHg  TRICUSPID VALVE:  There was mild regurgitation  PERICARDIUM:  A small, partially loculated pericardial effusion was identified along the left ventricular free wall  HISTORY: PRIOR HISTORY: Aortic valve prosthesis    PROCEDURE: The procedure was performed at the bedside  This was a routine study   The transthoracic approach was used  The study included complete 2D imaging, complete spectral Doppler, and color Doppler  The heart rate was 80 bpm, at the  start of the study  Intravenous contrast (Definity solution [1 3 ml Definity/8 7ml normal saline solution]) was administered  Intravenous contrast (Definity solution [1 3 ml Definity/8 7ml normal saline solution]) was administered to  opacify the left ventricle and enhance Doppler signals  Echocardiographic views were limited due to low windows and patient on mechanical ventilator  This was a technically difficult study despite the administration of echo contrast     LEFT VENTRICLE: Size was normal  Systolic function was markedly reduced  Ejection fraction was estimated to be 30 %  There was severe diffuse hypokinesis with no obvious identifiable regional variations  Wall thickness was moderately  increased  Concentric hypertrophy was present  DOPPLER: Normal sinus rhythm was absent  The study was not technically sufficient to allow evaluation of LV diastolic function  VENTRICULAR SEPTUM: There was dyssynergic motion  RIGHT VENTRICLE: The ventricle was mildly dilated  Systolic function was moderately to markedly reduced  LEFT ATRIUM: The atrium was mildly dilated  RIGHT ATRIUM: The atrium was mildly dilated  MITRAL VALVE: Not well visualized  DOPPLER: The transmitral velocity was within the normal range  There was no evidence for stenosis  There was no significant regurgitation  AORTIC VALVE: A bioprosthesis was present  It was not well visualized  Mean transvalvular gradient 13 mmHg  DOPPLER: There was no significant regurgitation  TRICUSPID VALVE: The valve structure was normal  There was normal leaflet separation  DOPPLER: The transtricuspid velocity was within the normal range  There was no evidence for stenosis  There was mild regurgitation   The tricuspid jet  envelope definition was inadequate for estimation of RV systolic pressure  PULMONIC VALVE: Leaflets exhibited normal thickness, no calcification, and normal cuspal separation  DOPPLER: The transpulmonic velocity was within the normal range  There was no significant regurgitation  PERICARDIUM: A small, partially loculated pericardial effusion was identified along the left ventricular free wall  The pericardium was normal in appearance  AORTA: The root exhibited normal size  SYSTEM MEASUREMENT TABLES    2D  %FS: 15 83 %  Ao Diam: 3 09 cm  EDV(Teich): 221 88 ml  EF(Teich): 32 54 %  ESV(Teich): 149 69 ml  IVSd: 1 59 cm  LA Diam: 5 18 cm  LVIDd: 6 58 cm  LVIDs: 5 54 cm  LVPWd: 1 43 cm  SV(Teich): 72 19 ml    CW  AV Env  Ti: 233 56 ms  AV VTI: 49 77 cm  AV Vmax: 2 64 m/s  AV Vmax: 2 67 m/s  AV Vmean: 2 13 m/s  AV maxP 95 mmHg  AV maxP 53 mmHg  AV meanP 73 mmHg    PW  LVOT Env  Ti: 215 22 ms  LVOT VTI: 11 41 cm  LVOT Vmax: 0 59 m/s  LVOT Vmax: 0 7 m/s  LVOT Vmean: 0 52 m/s  LVOT maxP 78 mmHg  LVOT maxP mmHg  LVOT meanP 24 mmHg  MV E Deep: 1 01 m/s    [employer] Accredited Echocardiography Laboratory    Prepared and electronically signed by    Gardenia Harper DO  Signed 2019 10:14:55       No results found for this or any previous visit  No procedure found  No results found for this or any previous visit  Imaging: I have personally reviewed pertinent reports  Ct Chest Abdomen Pelvis Wo Contrast    Result Date: 2019  Narrative: CT CHEST, ABDOMEN AND PELVIS WITHOUT IV CONTRAST INDICATION:   Sepsis  COMPARISON:  None  TECHNIQUE: CT examination of the chest, abdomen and pelvis was performed without intravenous contrast   Axial, sagittal, and coronal 2D reformatted images were created from the source data and submitted for interpretation  Radiation dose length product (DLP) for this visit:  2085 mGy-cm     This examination, like all CT scans performed in the [place], was performed utilizing techniques to minimize radiation dose exposure, including the use of iterative reconstruction and automated exposure control  Enteric contrast was administered  FINDINGS: CHEST LUNGS:  Patchy consolidative changes seen at the bilateral lung bases, consistent with atelectasis although infiltrate would have to be excluded clinically  Additional segmental atelectasis along the right major fissure     There is no tracheal or endobronchial lesion  PLEURA:  Unremarkable  HEART/GREAT VESSELS:  Atherosclerotic changes are noted in thoracic aorta noted  Status post valve replacement  MEDIASTINUM AND JAMAR:  Unremarkable  CHEST WALL AND LOWER NECK:   Unremarkable  ABDOMEN LIVER/BILIARY TREE:  Indeterminate 3 3 cm hypodensity in the left hepatic lobe  Additional vague 1 6 cm hypodense focus more inferiorly within the left hepatic lobe  GALLBLADDER:  No calcified gallstones  No pericholecystic inflammatory change  SPLEEN:  Unremarkable  PANCREAS:  Unremarkable  ADRENAL GLANDS:  Unremarkable  KIDNEYS/URETERS:  One or more simple renal cyst(s) is noted  Otherwise unremarkable kidneys  No hydronephrosis  STOMACH AND BOWEL:  Unremarkable  APPENDIX:  No findings to suggest appendicitis  ABDOMINOPELVIC CAVITY:  No ascites or free intraperitoneal air  No lymphadenopathy  VESSELS:  Atherosclerotic changes are present  No evidence of aneurysm  PELVIS REPRODUCTIVE ORGANS:  Unremarkable for patient's age  URINARY BLADDER:  Turner catheter is in place, which limits evaluation    ABDOMINAL WALL/INGUINAL REGIONS:  Unremarkable  OSSEOUS STRUCTURES:  No acute fracture or destructive osseous lesion  Impression: Patchy consolidative changes at the bilateral lung bases, likely secondary to atelectasis, or alternatively, in the appropriate clinical context, acute infiltrate, possibly secondary to aspiration Workstation performed: [id number]     Xr Chest Portable    Result Date: 1/24/2019  Narrative: CHEST INDICATION:   Line placement   COMPARISON: 1/24/2019  EXAM PERFORMED/VIEWS:  XR CHEST PORTABLE FINDINGS:  Right IJ with tip inferior aspect of the jugular vein--unchanged  Soft tissue capping at the right apex is smooth and was present before right IJ placement  New left IJ catheter tip is positioned appropriately with tip at the cavoatrial junction  Unchanged endotracheal tube, intact median sternotomy wires, and aortic valvular replacement  Unchanged enteric tube with side-port at the distal esophagus  Unchanged cardiomegaly and pulmonary vascular congestion  Platelike atelectasis in the left midlung and base  No new consolidations  Bones are unremarkable  Impression: 1  Appropriately positioned left IJ catheter  No pneumothorax on either side  2   Otherwise, unchanged lines and tubes  3   Persistent pulmonary vascular congestion and atelectasis in the lingula and left lower lobe  Workstation performed: [id number]     Xr Chest Portable    Result Date: 1/24/2019  Narrative: CHEST INDICATION:   line placement  COMPARISON:  1/24/2019 EXAM PERFORMED/VIEWS:  XR CHEST PORTABLE     Impression: FINDINGS/IMPRESSION: 1  Right IJ catheter tip is superficial in location projecting over the region of the inferior right jugular vein  Recommend replacement  2   Appropriately positioned endotracheal tube  3   Unchanged enteric tube with side-port at the distal esophagus  4   Unchanged low-grade alveolar edema and left midlung platelike atelectasis  5   Unchanged heart and megaly and pulmonary vascular congestion  The study was marked in EPIC for significant notification  Workstation performed: [id number]     Xr Chest Portable    Result Date: 1/24/2019  Narrative: CHEST INDICATION:   pneumothorax r/o  COMPARISON:  1/23/2019 EXAM PERFORMED/VIEWS:  XR CHEST PORTABLE FINDINGS:  Endotracheal tube tip appears to have been slightly retracted, measuring 5 6 cm above the raad  The tip is still below the level of the clavicles    Difference could be accentuated by increasingly lordotic position  Intact median sternotomy  wires  Aortic valvular prosthesis  New nasogastric tube with tip at the stomach and side-port at the distal esophagus  Heart shadow is enlarged but unchanged from prior exam  Central patchy opacities appear decreased  However, there is no platelike consolidation in the left midlung field  No pneumothorax or effusion  Bones are unremarkable  Impression: 1  Endotracheal tube tip appears slightly retracted from prior  However, tip remains below the clavicles in the thoracic trachea  2   Alveolar edema appears decreased, but there are new platelike opacities in the left midlung field with atelectasis  3   Enteric tube tip in the stomach with side-port in distal esophagus  If the tube is being used for decompression, positioning is adequate  If the tube is being used for feeding, recommend advancing by 12 cm  4   No pneumothorax  Workstation performed: [id number]     Xr Chest 1 View Portable    Result Date: 1/24/2019  Narrative: CHEST INDICATION:   Postintubation  COMPARISON:  1/23/2019 EXAM PERFORMED/VIEWS:  XR CHEST PORTABLE FINDINGS:  Appropriately positioned endotracheal tube tip measuring 3 cm from the raad  Intact median sternotomy wires  Aortic valvular replacement  Heart shadow is enlarged but unchanged from prior exam   Persistent pulmonary vascular engorgement and cephalization  Increasing patchy bilateral consolidations  No pneumothorax or effusion  Bones are unremarkable  Impression: 1  Increasing symmetric consolidative opacities  This most likely represents increasing alveolar edema  Correlate with BNP, if needed  Multifocal pneumonia is felt to be less likely given presence of pulmonary vascular congestion  2   Appropriately positioned endotracheal tube  Workstation performed: [id number]     Xr Chest 1 View Portable    Result Date: 1/23/2019  Narrative: CHEST INDICATION:   Chest pain, shortness of breath, and SVT  COMPARISON:  10/31/2014 EXAM PERFORMED/VIEWS:  XR CHEST PORTABLE FINDINGS:  Intact median sternotomy wires without dehiscence  Dilated cardiomyopathy  Pulmonary vascular engorgement and cephalization  Patchy central opacities suggesting alveolar edema  No pneumothorax or effusion  Bones are unremarkable  Impression: Findings of volume overload including pulmonary vascular congestion and mild alveolar edema  No effusions demonstrated  Workstation performed: [id number]     Ct Head Without Contrast    Result Date: 1/23/2019  Narrative: CT BRAIN - WITHOUT CONTRAST INDICATION:   abnormal mental status  COMPARISON:  None  TECHNIQUE:  CT examination of the brain was performed  In addition to axial images, coronal 2D reformatted images were created and submitted for interpretation  Radiation dose length product (DLP) for this visit:  966 93 mGy-cm   This examination, like all CT scans performed in the [facility], was performed utilizing techniques to minimize radiation dose exposure, including the use of iterative  reconstruction and automated exposure control  IMAGE QUALITY:  Image quality is limited through the posterior fossa  FINDINGS: PARENCHYMA:  There is no visible parenchymal mass or hemorrhage or midline shift  In the right frontal lobe, inferiorly, on image 27 series 2 there seems to be an area of diminished distinction between gray matter and white matter which might signify an  area of subacute ischemia, however, in this location it might simply be artifact  Suggest short interval follow-up in about 12 hours for reassessment  Overall, the finding is favored to be an artifact  An MRI, if possible, would presumably be much more definitive  VENTRICLES AND EXTRA-AXIAL SPACES:  Normal for the patient's age  VISUALIZED ORBITS AND PARANASAL SINUSES:  There is prominence of the intraorbital vessels bilaterally  These may simply be varicosities    There does not appear to be any evidence of acute pathology in the cavernous sinus  Sinuses are clear  CALVARIUM AND EXTRACRANIAL SOFT TISSUES:  No acute findings  Small amount of right mastoid fluid noted incidentally  Impression: Area of low density/diminished gray-white distinction in the inferior right frontal lobe which is probably artifactual, however, 12 hour follow-up reassessment suggested as this could be subacute ischemia  No hemorrhage  Dilated intraorbital vessels bilaterally perhaps venous varicosities  No gross evidence of pathology of the cavernous sinus   Workstation performed: [id number]         EKG: Multiple EKGs since yesterday reviewed- Sinus tachycardia with LBBB (old), no Sgarbossa criteria

## 2019-01-24 NOTE — PLAN OF CARE
CARDIOVASCULAR - ADULT     Maintains optimal cardiac output and hemodynamic stability Not Progressing     Absence of cardiac dysrhythmias or at baseline rhythm Not Progressing        DISCHARGE PLANNING     Discharge to home or other facility with appropriate resources Not Progressing        GENITOURINARY - ADULT     Maintains or returns to baseline urinary function Not Progressing        INFECTION - ADULT     Absence or prevention of progression during hospitalization Not Progressing        Knowledge Deficit     Patient/family/caregiver demonstrates understanding of disease process, treatment plan, medications, and discharge instructions Not Progressing        SAFETY ADULT     Maintain or return to baseline ADL function Not Progressing     Maintain or return mobility status to optimal level Not Progressing          Pt admitted to room 207, unable to complete admission navigator at this time due to intubation and sedation

## 2019-01-24 NOTE — RESPIRATORY THERAPY NOTE
RT Protocol Note  Greta Nolan 60 y o  male MRN: [id number]  Unit/Bed#:  Encounter: [id number]    Assessment    Principal Problem:    NSTEMI (non-ST elevated myocardial infarction) [facility])  Active Problems:    Essential hypertension    Sepsis ([facility] [address] )    Acute encephalopathy    PATRICK (acute kidney injury) ([facility] )    Respiratory distress      Home Pulmonary Medications:  Patient does not take any respiratory medicines       Past Medical History:   Diagnosis Date    Allergic rhinitis     last assessed 9/12/12    Finger fracture, right     Closed fx of the middle phalanx of the right 5th finger  last assessed 1/30/14    Hemorrhoids     last assessed 2/10/14    Hyperlipidemia     Hypertension      Social History     Social History    Marital status: /Civil Union     Spouse name: N/A    Number of children: N/A    Years of education: N/A     Social History Main Topics    Smoking status: Never Smoker    Smokeless tobacco: Never Used    Alcohol use No      Comment: ocassion /never drank alcohol per [employer]     Drug use: No    Sexual activity: Not Asked     Other Topics Concern    None     Social History Narrative    None       Subjective    Subjective Data: patient unable to answer questions at this time, due to being sedated    Objective  Continue mechanical ventilation as needed, with suctioning to improve bronchial hygiene, prn albuteral treatment    Physical Exam:   Assessment Type: During-treatment  General Appearance: Sedated  Respiratory Pattern: Assisted  Chest Assessment: Chest expansion symmetrical  Bilateral Breath Sounds: Diminished  Suction: ET Tube  O2 Device: mechanical ventilation    Vitals:  Blood pressure (!) 74/48, pulse (!) 114, temperature (!) 101 6 °F (38 7 °C), temperature source Tympanic, resp  rate 22, weight (!) 154 kg (338 lb 10 oz), SpO2 94 %        Results from last 7 days  Lab Units 01/23/19  2109   PH ART  7 400   PCO2 ART mm Hg 35 1*   PO2 ART mm Hg 261 7*   HCO3 ART mmol/L 21 3*   BASE EXC ART mmol/L -2 8   O2 CONTENT ART mL/dL 21 5   O2 HGB, ARTERIAL % 98 7*   ABG SOURCE  Radial, Left   MARLON TEST  Yes       Imaging and other studies: I have personally reviewed pertinent reports  O2 Device: mechanical ventilation     Plan    Respiratory Plan: Vent/NIV/HFNC  Airway Clearance Plan: Incentive Spirometer     Resp Comments: Received patient in the ED, he was on Vapotherm, he then got intubated and placed on ventilator  We transported him to CAT scan and then to the ICU  He is presently sedated  Suctioning of his ETT tube, thick, yellow secretions, sputum sent for culture  ETT tube placement checked via auscultation, end tidal CO2 adapter, and xray and cat scan  ABGs were drawn on the ventilator

## 2019-01-24 NOTE — ASSESSMENT & PLAN NOTE
NSTEMI type 2 in the setting of severe sepsis/septic shock, possible cardiogenic shock  Continue with IV heparin  Patient had normal coronary anatomy in 2014 prior to aortic valve replacement

## 2019-01-24 NOTE — TELEMEDICINE
Consultation - Infectious Disease   Veronica Burkett 60 y o  male MRN: [id number]  Unit/Bed#:  Encounter: [id number]      Inpatient consult to Infectious Diseases  Consult performed by: Peri Roa ordered by: Cole Case DOCUMENTATION:     1  This service was provided via Telemedicine  2  Provider located at Home  3  TeleMed provider: Tayla Yadav MD   4  Identify all parties in room with patient during tele consult:  n/a  5  After connecting through [employer]ht[employer], patient was identified by name and date of birth and assistant checked wristband  Patient was then informed that this was a Telemedicine visit and that the exam was being conducted confidentially over secure lines  My office door was closed  No one else was in the room  Patient acknowledged consent and understanding of privacy and security of the Telemedicine visit, and gave us permission to have the assistant stay in the room in order to assist with the history and to conduct the exam   I informed the patient that I have reviewed their record in Epic and presented the opportunity for them to ask any questions regarding the visit today  The patient agreed to participate  Assessment/Recommendations     50-year-old male presents with septic shock, gram positive bacteremia, NSTEMI, acute kidney injury and acute encephalopathy    1  Septic shock, present on admission  - Suspect cause of sepsis to be gram positive bacteremia   Less likely to be pneumonia as patient had no preceding respiratory symptoms but cannot rule out aspiration pneumonia given lung base opacities on chest imaging    - Remains critically ill, febrile, on pressors    · Continue Vancomycin, dosing per pharmacy protocol  · Follow blood cultures, repeat blood cultures  · Discontinue Cefepime and switch to Cefazolin, renally dosed, 1g q12h for the possibility of MSSA sepsis  · Add metronidazole 500 mg iv q8h  · Recommend CHON when feasible  · Continue supportive management per critical care team    2  Gram positive bacteremia, suspect S aureus  - Portal of entry may have been skin wounds over upper back, although none are open at present or appear acutely infected  - CT chest/abdomen shows no metastatic focus of infection  TTE notes no vegetation    · Management as above    3  Possible aspiration pneumonia    · Recommend adding metronidazole    4  NSTEMI, bioprosthetic AVR, acute cardiomyopathy, new onset a fib  - NSTEMI thought to be secondary to acute illness, patient had a normal cardiac cath in 2014  No evidence of vegetation on TTE   - Cause of cardiomyopathy may be acute myocarditis (bacterial myocarditis would be rare and a concomitant viral myocarditis would be unusual) v/s stress cardiomyopathy     · Continue supportive care, management per cardiology team    5  Acute kidney injury, likely septic ATN  - Creatinine continues to rise and urine output is declining    · Supportive care, recommend nephrology consult    6  Acute encephalopathy, present on admission, likely multifactorial  - No focal neurologic or meningeal signs    · Continue supportive care    Given patient's critical illness, agree with decision to transfer for higher level critical care management  Discussed with the primary service  History     Reason for Consult: Severe sepsis  HPI: Lilia Pace is a 60y o  year old male with a history of hyperlipidemia and hypertension and is s/p bioprosthetic AVR in 2014 with an episode of pericarditis/pericardial effusion  Patient is currently intubated and history is obtained mainly from the medical records  He was in his usual state of health until 2 days ago when his wife noticed that he appeared flushed and hot to touch  One day prior to presentation he also began to note some nonspecific chest pain  On 01/23 he had acute onset chest pain and shortness of breath    EMS was summoned, he was noted to be hypoxic with SVT, heart rate in the 170s but normotensive  In the ER, he appeared acutely ill, tachycardic and in respiratory distress  He was confused and not responding to questioning  Labs were notable for lactic acidosis, leukocytosis, elevated troponin and elevated creatinine  EKG noted no acute ST changes  CXR showed no infiltrate but noted pulmonary vascular congestion and CT chest/abdomen showed no focus of infection except for patchy opacities in the bilateral lung bases which could represent atelectasis or aspiration pneumonia  He was admitted and started on vancomycin and cefepime  He remains febrile  Cultures are pending  He required intubation and is on pressors  TTE was a limited study but notes significant biventricular dysfunction, ER of 30 % with severe diffuse wall motion abnormalities  Blood cultures from admission, both sets, are growing gram positive cocci in clusters  Flu panel was negative  Infectious disease is being consulted for diagnostic work up and antibiotic management  Review of Systems  A [id number] point system-based review of systems is otherwise negative  PAST MEDICAL HISTORY:  Past Medical History:   Diagnosis Date    Allergic rhinitis     last assessed 9/12/12    Finger fracture, right     Closed fx of the middle phalanx of the right 5th finger  last assessed 1/30/14    Hemorrhoids     last assessed 2/10/14    Hyperlipidemia     Hypertension      Past Surgical History:   Procedure Laterality Date    AORTIC VALVE REPLACEMENT  07/30/2014    AVR with 25mm [facility] Ease bovine pericardial valve    CARDIAC CATHETERIZATION  07/18/2014    SLB left main-normal, circumflex-normal, ramus intermedius-normal, RCA was large and dominant giving rise to the PDA & a large posterolateral branch  No disease    last assessed 8/19/14     KNEE CARTILAGE SURGERY Left     medial meniscus tear     TESTICLE SURGERY      TONSILLECTOMY AND ADENOIDECTOMY      TOOTH EXTRACTION FAMILY HISTORY:  Non-contributory    SOCIAL HISTORY:  Social History   /Civil Union  History   Alcohol Use No     Comment: ocassion /never drank alcohol per [employer]      History   Drug Use No     History   Smoking Status    Never Smoker   Smokeless Tobacco    Never Used       ALLERGIES:  Allergies   Allergen Reactions    Penicillins Rash       MEDICATIONS:  All current active medications have been reviewed  Physical Exam     Temp:  [99 °F (37 2 °C)-103 5 °F (39 7 °C)] 100 2 °F (37 9 °C)  HR:  [] 86  Resp:  [18-59] 20  BP: ()/() 86/58  SpO2:  [93 %-100 %] 95 %  Temp (24hrs), Av 5 °F (38 6 °C), Min:99 °F (37 2 °C), Max:103 5 °F (39 7 °C)  Current: Temperature: 100 2 °F (37 9 °C)    Intake/Output Summary (Last 24 hours) at 19 1104  Last data filed at 19 1022   Gross per 24 hour   Intake          4012 83 ml   Output              471 ml   Net          3541 83 ml       Physical exam findings reported by bedside and primary medical team staff    General Appearance:  Intubated, sedated   Head:  Normocephalic, without obvious abnormality, atraumatic   Eyes:  PERRL, conjunctiva pink and sclera anicteric, both eyes   Nose: Nares normal, mucosa normal, no drainage   Throat: Oropharynx moist without lesions; lips, mucosa, and tongue normal; teeth and gums normal   Neck: Supple, symmetrical, trachea midline, no adenopathy, no tenderness/mass/nodules   Back:   Symmetric, no curvature, ROM normal, no CVA tenderness   Lungs:   Coarse breath sounds   Chest Wall:  No tenderness or deformity   Heart:  Tachycardic, no murmurs   Abdomen:   Soft, non-tender, non-distended, positive bowel sounds, no masses, no organomegaly    No CVA tenderness   Extremities: B/L pedal edema   Skin: Scabbed lesions over upper back, arms and abdomen, no open wounds     Lymph nodes: Cervical, supraclavicular, and axillary nodes normal   Neurologic: Non focal       Invasive Devices:   CVC Central Lines 01/24/19 Triple Right Internal jugular (Active)       Peripheral IV 01/23/19 Right Antecubital (Active)   Site Assessment Clean;Dry; Intact 1/23/2019  4:01 PM   Dressing Type Transparent 1/23/2019  4:01 PM   Line Status Blood return noted; Flushed;Saline locked 1/23/2019  4:01 PM   Dressing Status Clean;Dry; Intact 1/23/2019  4:01 PM       Peripheral IV 01/23/19 Left Antecubital (Active)   Site Assessment Clean;Dry; Intact 1/23/2019 11:00 PM   Dressing Type Transparent;Securing device 1/23/2019 11:00 PM   Line Status Blood return noted; Flushed;Saline locked 1/23/2019 11:00 PM   Dressing Status Clean;Dry; Intact 1/23/2019 11:00 PM       Peripheral IV 01/24/19 Right Hand (Active)   Site Assessment Clean;Dry; Intact 1/24/2019  2:00 AM   Dressing Type Transparent;Securing device 1/24/2019  2:00 AM   Line Status Blood return noted; Flushed;Saline locked 1/24/2019  2:00 AM   Dressing Status Clean;Dry; Intact 1/24/2019  2:00 AM       NG/OG/Enteral Tube Orogastric 14 Fr Right mouth (Active)   Placement Reverification Auscultation 1/24/2019  3:00 AM   Site Assessment Clean;Dry; Intact;Drainage 1/23/2019  8:28 PM   Status Suction-low intermittent 1/23/2019  8:28 PM   Drainage Appearance Bile 1/24/2019  3:00 AM       Urethral Catheter Latex (Active)   Amt returned on insertion(mL) 80 mL 1/23/2019  5:24 PM   Site Assessment Clean;Skin intact 1/23/2019  5:24 PM   Collection Container Standard drainage bag 1/23/2019  5:24 PM   Securement Method Securing device (Describe) 1/23/2019  5:24 PM   Output (mL) 11 mL 1/24/2019  6:20 AM       ETT  Cuffed 7 5 mm (Active)   Secured at (cm) 24 1/24/2019  7:38 AM   Measured from Lips 1/24/2019  7:38 AM   Secured Location Left 1/24/2019  7:38 AM   Repositioned Right to Left 1/24/2019  7:38 AM   Secured by Commercial tube rosales 1/24/2019  7:38 AM   Site Condition Dry 1/24/2019  7:38 AM   Cuff Pressure (cm H2O) 24 cm H2O 1/24/2019  7:38 AM   HI-KESHAWN Suction  Intermittent suction 1/24/2019  7:38 AM   CECILY Secretions Moderate; Thick; Yellow 1/24/2019  2:46 AM   HI-LO Intervention Patent 1/24/2019  7:38 AM       Labs, Imaging, & Other Studies     Lab Results:    I have personally reviewed pertinent labs  Results from last 7 days  Lab Units 01/24/19  0502 01/23/19  2303 01/23/19  1600   WBC Thousand/uL 12 10* 12 27* 16 01*   HEMOGLOBIN g/dL 13 3 14 4 14 6   PLATELETS Thousands/uL 81* 83* 112*       Results from last 7 days  Lab Units 01/24/19  0502  01/23/19  1600   POTASSIUM mmol/L 3 7  < > 3 9   CHLORIDE mmol/L 106  < > 99*   CO2 mmol/L 22  < > 25   BUN mg/dL 45*  < > 34*   CREATININE mg/dL 3 70*  < > 2 63*   EGFR ml/min/1 73sq m 17  < > 25   CALCIUM mg/dL 7 0*  < > 8 0*   AST U/L  --   --  242*   ALT U/L  --   --  90*   ALK PHOS U/L  --   --  89   < > = values in this interval not displayed  Results from last 7 days  Lab Units 01/23/19  2301 01/23/19  1850 01/23/19  1654 01/23/19  1600   GRAM STAIN RESULT   --   --  Gram positive cocci in clusters Gram positive cocci in clusters   INFLUENZA B PCR   --  None Detected  --   --    RSV PCR   --  None Detected  --   --    LEGIONELLA URINARY ANTIGEN  Negative  --   --   --        Imaging Studies:   I have personally reviewed pertinent imaging study reports and images in PACS  EKG, Pathology, and Other Studies:   I have personally reviewed pertinent reports  Counseling/Coordination of care: Total 70 minutes communication with the patient via telehealth  Labs, medical tests and imaging studies were independently reviewed by me as noted above in HPI and old records were obtained and summarized as noted above in HPI

## 2019-01-24 NOTE — ASSESSMENT & PLAN NOTE
Patient with reduced ejection fraction of approximately 20% compared to baseline of 50% on prior echo  Case discussed with Cardiology, continue supportive care, no inotropic agents are recommended at this time  Continue with heparin drip  This is most likely a stress-induced cardiomyopathy from underlying infection, plan is transfer to higher level of care, continue supportive care, IV pressors to maintain mean arterial pressure greater than 65  Possible component of cardiogenic shock

## 2019-01-24 NOTE — ASSESSMENT & PLAN NOTE
Patient with preliminary blood culture showing gram-positive cocci in clusters, suspicious for Staphylococcus aureus  Case discussed with Infectious Disease, change antibiotics to IV cefazolin 1 g Q 12, add Flagyl 500 mg IV Q 8  Continue vancomycin  Acute hepatitis panel ordered, check EBV serology

## 2019-01-24 NOTE — H&P
History and Physical - Critical Care   Sander Langford 60 y o  male MRN: [id number]  Unit/Bed#: MICU 11 Encounter: [id number]    Reason for Admission / Chief Complaint: SOB and Chest Pain    History of Present Illness: PER WIFE  Sander Langford is a 60 y o  male who called EMS after experiencing 2 days of worsening malaise and weakness, Wednesday afternoon the patient became extremely confused  EMS arrived and once evaluated realized the patient was in SVT, given adenosine which was successful  The patient was taken to [facility] ED where he was seen having shortness of breath, EKGs showed no ST changes just a known left bundle branch block  Patient was given 1 nitro sublingual  Pt developed severe respiratory distress and was intubated  Pt was pan cultured and empiric antibiotics were initiated along with fluid boluses  Labs at [facility] showed leukocytosis, troponin spill >14 x 2 and metabolic acidosis  Pt became hypotensive started on pressors and bicarb infusion  Bedside echo showed EF of 30%  H/o LBBB and an Aortic valve repair in 2014, recent Echo in 2018 showed EF of 50%  Patient was then transferred to [facility] MICU for further evaluation  Of note pts wife described the patients posterior neck and scalp as being bright red and hot on Monday  Today he has well defined scratches in those areas that broke skin  History obtained from chart review and the patients wife      Past Medical History:  Past Medical History:   Diagnosis Date    Allergic rhinitis     last assessed 9/12/12    Finger fracture, right     Closed fx of the middle phalanx of the right 5th finger  last assessed 1/30/14    Hemorrhoids     last assessed 2/10/14    Hyperlipidemia     Hypertension        Past Surgical History:  Past Surgical History:   Procedure Laterality Date    AORTIC VALVE REPLACEMENT  07/30/2014    AVR with 25mm [facility] Ease bovine pericardial valve    CARDIAC CATHETERIZATION  07/18/2014    SLB left main-normal, circumflex-normal, ramus intermedius-normal, RCA was large and dominant giving rise to the PDA & a large posterolateral branch  No disease    last assessed 8/19/14     KNEE CARTILAGE SURGERY Left     medial meniscus tear     TESTICLE SURGERY      TONSILLECTOMY AND ADENOIDECTOMY      TOOTH EXTRACTION         Past Family History:  Family History   Problem Relation Age of Onset    Lung cancer Mother     Heart disease Mother         pacemaker placement     Coronary artery disease Father     Hypertension Father     Heart attack Father     Cancer Family         bladder        Social History:  History   Smoking Status    Never Smoker   Smokeless Tobacco    Never Used     History   Alcohol Use No     Comment: ocassion /never drank alcohol per [employer]      History   Drug Use No     Marital Status: /Civil Union  Exercise History: able to walk up a flight of stairs    Medications:  Current Facility-Administered Medications   Medication Dose Route Frequency    acetaminophen (TYLENOL) rectal suppository 650 mg  650 mg Rectal Q6H PRN    albuterol inhalation solution 2 5 mg  2 5 mg Nebulization Q4H PRN    [START ON 1/25/2019] aspirin (ECOTRIN) EC tablet 325 mg  325 mg Oral Daily    ceFAZolin (ANCEF) IVPB (premix) 2,000 mg  2,000 mg Intravenous Q8H    chlorhexidine (PERIDEX) 0 12 % oral rinse 15 mL  15 mL Swish & Spit Q12H [facility]    dexmedetomidine (PRECEDEX) 200 mcg in sodium chloride 0 9 % 50 mL infusion  0 1-0 7 mcg/kg/hr Intravenous Titrated    metroNIDAZOLE (FLAGYL) IVPB (premix) 500 mg  500 mg Intravenous Q8H    norepinephrine (LEVOPHED) 4 mg (STANDARD CONCENTRATION) IV in sodium chloride 0 9% 250 mL  1-30 mcg/min Intravenous Titrated    nystatin (MYCOSTATIN) powder   Topical BID    [START ON 1/25/2019] thiamine (VITAMIN B1) 200 mg in sodium chloride 0 9 % 50 mL IVPB  200 mg Intravenous Daily    vancomycin (VANCOCIN) 2,000 mg in sodium chloride 0 9 % 500 mL IVPB  2,000 mg Intravenous Q24H    vasopressin (PITRESSIN) 20 Units in sodium chloride 0 9 % 100 mL infusion  0 04 Units/min Intravenous Continuous     Home medications:  Prior to Admission medications    Medication Sig Start Date End Date Taking? Authorizing Provider   amLODIPine (NORVASC) 5 mg tablet Take 1 tablet (5 mg total) by mouth daily 18   Omaira Deluna MD   ascorbic acid (VITAMIN C) 500 MG tablet Take 1 tablet by mouth 2 (two) times a day    Historical Provider, MD   aspirin (ECOTRIN) 325 mg EC tablet Take 1 tablet (325 mg total) by mouth daily 18   Omaira Deluna MD   fluticasone (FLONASE) 50 mcg/act nasal spray 1 spray into each nostril 2 (two) times a day 2/19/15   Historical Provider, MD   loratadine (CLARITIN) 10 mg tablet Take 1 tablet by mouth    Historical Provider, MD   metoprolol tartrate (LOPRESSOR) 100 mg tablet Take 1 tablet (100 mg total) by mouth 2 (two) times a day 18   Omaira Deluna MD   potassium chloride (K-DUR,KLOR-CON) 20 mEq tablet Take 1 tablet (20 mEq total) by mouth daily 18   Omaira Deluna MD   pravastatin (PRAVACHOL) 40 mg tablet Take 1 tablet (40 mg total) by mouth daily at bedtime 18   Omaira Deluna MD   torsemide (DEMADEX) 20 mg tablet Take 1 tablet (20 mg total) by mouth daily 18   Omaira Deluna MD     Allergies: Allergies   Allergen Reactions    Penicillins Rash       ROS:   Review of Systems   Unable to perform ROS: Intubated       Vitals:  Vitals:    19 1500   SpO2: 98%     Temperature:   Temp (24hrs), Av 5 °F (38 6 °C), Min:99 8 °F (37 7 °C), Max:103 5 °F (39 7 °C)    Current      Weights: There is no height or weight on file to calculate BMI      Hemodynamic Monitoring:  PAP:  , CVP:       Non-Invasive/Invasive Ventilation Settings:  Respiratory    Lab Data (Last 4 hours)    None         O2/Vent Data (Last 4 hours)       1500           Vent Mode AC/VC       Resp Rate (BPM) (BPM) 20       Vt (mL) (mL) 500       FIO2 (%) (%) 40       PEEP (cmH2O) (cmH2O) 5       MV 10 1                 Lab Results   Component Value Date    PHART 7 371 01/24/2019    [id number] 30 1 (L) 01/24/2019    PO2ART 92 9 01/24/2019    TAS5BOF 17 0 (L) 01/24/2019    BEART -6 9 01/24/2019    SOURCE Radial, Left 01/23/2019     SpO2: SpO2: 98 %     Physical Exam:  Physical Exam   Constitutional: He appears well-developed  No distress  HENT:   Head: Normocephalic  Eyes: Pupils are equal, round, and reactive to light  Conjunctivae are normal  Right eye exhibits no discharge  Left eye exhibits no discharge  Neck: Normal range of motion  Neck supple  Cardiovascular: Normal rate and normal heart sounds  Heart sounds distant   Pulmonary/Chest: Effort normal and breath sounds normal  No respiratory distress  He has no wheezes  CTA B/L   Abdominal: Soft  He exhibits no distension     No bowel sounds heard   Genitourinary:   Genitourinary Comments: anupam   Neurological:   Sedated intubated   Skin:            Labs:    Results from last 7 days  Lab Units 01/24/19  0502 01/23/19  2303 01/23/19  1600   WBC Thousand/uL 12 10* 12 27* 16 01*   HEMOGLOBIN g/dL 13 3 14 4 14 6   HEMATOCRIT % 43 4 46 4 47 0   PLATELETS Thousands/uL 81* 83* 112*   NEUTROS PCT %  --   --  94*   MONOS PCT %  --   --  3*       Results from last 7 days  Lab Units 01/24/19  0502 01/24/19  0207 01/23/19  2251 01/23/19  1850 01/23/19  1600   SODIUM mmol/L 142 140 141 140 138   POTASSIUM mmol/L 3 7 3 6 4 0 3 9 3 9   CHLORIDE mmol/L 106 104 103 102 99*   CO2 mmol/L 22 22 21 25 25   ANION GAP mmol/L 14* 14* 17* 13 14*   BUN mg/dL 45* 44* 41* 36* 34*   CREATININE mg/dL 3 70* 3 06* 2 87* 2 29* 2 63*   CALCIUM mg/dL 7 0* 7 5* 7 4* 8 2* 8 0*   ALT U/L  --   --   --   --  90*   AST U/L  --   --   --   --  242*   ALK PHOS U/L  --   --   --   --  89   ALBUMIN g/dL  --   --   --   --  3 3*   TOTAL BILIRUBIN mg/dL  --   --   --   --  1 40*       Results from last 7 days  Lab Units 01/24/19  0502 01/23/19  1600   MAGNESIUM mg/dL 2 0 2 0   PHOSPHORUS mg/dL 4 4  --         Results from last 7 days  Lab Units 19  0753 19  0041 19  1600   INR   --   --  1 45*   PTT seconds 51* 48* 34       Results from last 7 days  Lab Units 19  0502 19  0207 19  2251 19  1850 19  1600   TROPONIN I ng/mL >40 00* >40 00* 36 61* 24 29* 14 20*       Results from last 7 days  Lab Units 19  0502 19  0132 19  2251 19  1850 19  1600   LACTIC ACID mmol/L 2 5* 2 2* 2 2* 3 3* 5 5*     ABG:  Lab Results   Component Value Date    PHART 7 371 2019    WKU2UFB 30 1 (L) 2019    PO2ART 92 9 2019    MKX8WMZ 17 0 (L) 2019    BEART -6 9 2019    SOURCE Radial, Left 2019     VBG:  Results from last 7 days  Lab Units 19  1115 19  2109   PH IVONNE  7 230*  --    PCO2 IVONNE mm Hg 49 8  --    PO2 IVONNE mm Hg 31 9*  --    HCO3 IVONNE mmol/L 20 4*  --    BASE EXC IVONNE mmol/L -7 4  --    ABG SOURCE   --  Radial, Left             Imagin/24:   CXR: Vascular congestion   CT head: No hemorrhage, Diminished gray/white distinction in frontal lobe  Recommended 12 hour follow up  Could mean ischemic changes  CT C/A/P: Pulmonary vascular congestion      I have personally reviewed pertinent reports      Micro:   :   Bcx x 2: Gram positive cocci    Ucx: pending   Influenza/lgeionella/Strep Pneumo Ag: negative   Resp Panel: pending   Scx: pending   Bcx x2: pending    Results from last 7 days  Lab Units 19  2301 19  1850 19  1654 19  1600   GRAM STAIN RESULT   --   --  Gram positive cocci in clusters Gram positive cocci in clusters   INFLUENZA B PCR   --  None Detected  --   --    RSV PCR   --  None Detected  --   --    LEGIONELLA URINARY ANTIGEN  Negative  --   --   --    STREP PNEUMONIAE ANTIGEN, URINE  Negative  --   --   --        ______________________________________________________________________    Assessment:   Septic Shock  Cardiogenic Shock  PATRICK  Non-oliguric renal failure  H/O AVR  Elevated Troponins  Rhabdomylosis  CHF LVEF 30%  Transaminitis      Plan:      See attending attestatione    Counseling / Coordination of Care  Total Critical Care time spent 120 minutes excluding procedures, teaching and family updates  ______________________________________________________________________    Invasive lines and devices: Invasive Devices     Central Venous Catheter Line            CVC Central Lines 01/24/19 Triple Left Internal jugular less than 1 day          Peripheral Intravenous Line            Peripheral IV 01/23/19 Right Antecubital 1 day    Peripheral IV 01/23/19 Left Antecubital less than 1 day    Peripheral IV 01/24/19 Right Hand less than 1 day          Drain            NG/OG/Enteral Tube Orogastric 14 Fr Right mouth less than 1 day    Urethral Catheter Latex less than 1 day          Airway            ETT  Cuffed 7 5 mm less than 1 day                Code Status: Level 1 - Full Code  POA:    POLST:      Given critical illness, patient length of stay will require greater than two midnights  Portions of the record may have been created with voice recognition software  Occasional wrong word or "sound a like" substitutions may have occurred due to the inherent limitations of voice recognition software  Read the chart carefully and recognize, using context, where substitutions have occurred        HERBERT Estes

## 2019-01-24 NOTE — ASSESSMENT & PLAN NOTE
Elevated lactic acid  WBC 16 01  Blood cultures pending  Urine cultures pending  Urine antigens ordered  Flu swab sent  Vancomycin and cefepime ordered  Tamiflu ordered  Admit to ICU-continuous cardiopulmonary monitoring

## 2019-01-24 NOTE — RESPIRATORY THERAPY NOTE
RT Ventilator Management Note  Georges Recio 60 y o  male MRN: [id number]  Unit/Bed#: [facility] 207 Encounter: [id number]      Daily Screen       1/23/2019 2002 1/24/2019 4747          Patient safety screen outcome[de-identified] Failed Failed      Not Ready for Weaning due to[de-identified] Hemodynamically unstable;Recieving paralytics Hemodynamically unstable; Underline problem not resolved;Poor inspiratory effort              Physical Exam:   Pt taken off the vent by flight crew, pt being transferred to [facility]

## 2019-01-24 NOTE — RESPIRATORY THERAPY NOTE
RT Ventilator Management Note  Delfino Raymond 60 y o  male MRN: [id number]  Unit/Bed#: [facility] 207 Encounter: [id number]      Daily Screen       1/23/2019 2002 1/24/2019 4699          Patient safety screen outcome[de-identified] Failed Failed      Not Ready for Weaning due to[de-identified] Hemodynamically unstable;Recieving paralytics Hemodynamically unstable; Underline problem not resolved;Poor inspiratory effort              Physical Exam:   Respiratory called to pt's bedside due to the "vent alarming"  Respiratory entered the room to find the pt's RR 40-50 on AC settings  CRNP notified when arrived to the bedside and ordered more medications to help calm the pt down  Pt remains on AC settings of [id number]%  Pt's inspiratory effort changed from flow trigger to pressure trigger  Plan to maintain on current vent settings  Will continue to monitor

## 2019-01-24 NOTE — ASSESSMENT & PLAN NOTE
Acute confusion prior to intubation  Most likely secondary to sepsis  Ct Head Without Contrast-- 1/23/2019-"Impression: Area of low density/diminished gray-white distinction in the inferior right frontal lobe which is probably artifactual, however, 12 hour follow-up reassessment suggested as this could be subacute ischemia  No hemorrhage  Dilated intraorbital vessels bilaterally perhaps venous varicosities    No gross evidence of pathology of the cavernous sinus "  Ammonia level - WDL

## 2019-01-24 NOTE — PROGRESS NOTES
Initial 500 ml NSS bolus started at 0059 (bag #1)  At 0218 a 2nd bag was hung at 150 ml/hr per verbal order from CRNP which was changed to 200 ml/hr by verbal order from CRNP (bag #2)  At 0325 CRNP ordered 1st bag to be increased to 950 ml/hr and to also increase bag #2 to 950 ml/hr  This was done  Approx 0425 both bags were administered and a continuous NSS infusion was initiated at 200 ml/hr

## 2019-01-24 NOTE — RESPIRATORY THERAPY NOTE
RT Protocol Note  Maryjo Mezar 60 y o  male MRN: [id number]  Unit/Bed#: MICU 11 Encounter: [id number]    Assessment    Active Problems:    * No active hospital problems  *      Home Pulmonary Medications:  none       Past Medical History:   Diagnosis Date    Allergic rhinitis     last assessed 9/12/12    Finger fracture, right     Closed fx of the middle phalanx of the right 5th finger  last assessed 1/30/14    Hemorrhoids     last assessed 2/10/14    Hyperlipidemia     Hypertension      Social History     Social History    Marital status: /Civil Union     Spouse name: N/A    Number of children: N/A    Years of education: N/A     Social History Main Topics    Smoking status: Never Smoker    Smokeless tobacco: Never Used    Alcohol use No      Comment: ocassion /never drank alcohol per [employer]     Drug use: No    Sexual activity: Not on file     Other Topics Concern    Not on file     Social History Narrative    No narrative on file       Subjective         Objective    Physical Exam:   Assessment Type: (P) Assess only  General Appearance: (P) Sedated  Respiratory Pattern: (P) Assisted  Chest Assessment: (P) Chest expansion symmetrical  Bilateral Breath Sounds: (P) Diminished, Clear  R Breath Sounds: (P) Diminished  L Breath Sounds: (P) Diminished    Vitals:  SpO2 98 %  Results from last 7 days  Lab Units 01/24/19  0504 01/23/19  2109   PH ART  7 371 7 400   PCO2 ART mm Hg 30 1* 35 1*   PO2 ART mm Hg 92 9 261 7*   HCO3 ART mmol/L 17 0* 21 3*   BASE EXC ART mmol/L -6 9 -2 8   O2 CONTENT ART mL/dL 19 0 21 5   O2 HGB, ARTERIAL % 96 1 98 7*   ABG SOURCE   --  Radial, Left   MARLON TEST  Yes Yes       Imaging and other studies: I have personally reviewed pertinent reports  Plan    Respiratory Plan: (P) Vent/NIV/HFNC        Resp Comments: (P) Pt admitted as [facility] and placed on vent on AC  BS are clear and diminshed @ this time   There is no pulmonary hx found in pt's med rec  Currently bronchodilators are not indicated  Will keep pt on resp protocol as per our vented pt policy

## 2019-01-24 NOTE — PROGRESS NOTES
Vancomycin Assessment    Gisela Leary is a 60 y o  male who is currently receiving vancomycin 1500mg Q12H for bacteremia     Relevant clinical data and objective history reviewed:  Creatinine   Date Value Ref Range Status   01/24/2019 3 70 (H) 0 60 - 1 30 mg/dL Final     Comment:     Standardized to IDMS reference method   01/24/2019 3 06 (H) 0 60 - 1 30 mg/dL Final     Comment:     Standardized to IDMS reference method   01/23/2019 2 87 (H) 0 60 - 1 30 mg/dL Final     Comment:     Standardized to IDMS reference method   04/09/2015 0 67 0 60 - 1 30 mg/dL Final     Comment:     Standardized to IDMS reference method   11/15/2014 0 70 0 60 - 1 30 mg/dL Final     Comment:     Standardized to IDMS reference method   11/02/2014 0 86 0 60 - 1 30 mg/dL Final     Comment:     Standardized to IDMS reference method     /70 (BP Location: Left arm)   Pulse 91   Temp 99 8 °F (37 7 °C) (Tympanic)   Resp 21   Wt (!) 154 kg (339 lb 8 1 oz)   SpO2 96%   BMI 44 79 kg/m²   I/O last 3 completed shifts: In: 2447 [IV Piggyback:2447]  Out: 471 [Urine:471]  Lab Results   Component Value Date/Time    BUN 45 (H) 01/24/2019 05:02 AM    BUN 17 04/09/2015 11:41 AM    WBC 12 10 (H) 01/24/2019 05:02 AM    WBC 6 71 11/02/2014 05:56 AM    HGB 13 3 01/24/2019 05:02 AM    HGB 11 9 (L) 11/02/2014 05:56 AM    HCT 43 4 01/24/2019 05:02 AM    HCT 39 3 11/02/2014 05:56 AM    MCV 87 01/24/2019 05:02 AM    MCV 81 (L) 11/02/2014 05:56 AM    PLT 81 (L) 01/24/2019 05:02 AM     11/02/2014 05:56 AM     Temp Readings from Last 3 Encounters:   01/24/19 99 8 °F (37 7 °C) (Tympanic)   06/23/15 97 8 °F (36 6 °C)   02/19/15 97 8 °F (36 6 °C)     Vancomycin Days of Therapy: 1    Assessment/Plan  The patient is currently on vancomycin utilizing scheduled dosing based on adjusted body weight (due to obesity)  Baseline risks associated with therapy include: pre-existing renal impairment and dehydration    The patient is currently receiving 1500mg Q12H and after clinical evaluation will be changed to 2000mg Q24H  Pharmacy will also follow closely for s/sx of nephrotoxicity, infusion reactions and appropriateness of therapy  BMP and CBC will be ordered per protocol  Plan for trough as patient approaches steady state, prior to the 3rd  dose at approximately 17:30 on 01/26/2019  Due to infection severity, will target a trough of 15-20 (appropriate for most indications)   Pharmacy will continue to follow the patients culture results and clinical progress daily      Rafa France, Pharmacist

## 2019-01-24 NOTE — DISCHARGE SUMMARY
Discharge- Wilberto Suggs 1960, 60 y o  male MRN: [id number]    Unit/Bed#:  Encounter: [id number]    Primary Care Provider: Kirstin Shen DO   Date and time admitted to hospital: 1/23/2019  3:26 PM        * Severe sepsis with septic shock (CODE) [facility])   Assessment & Plan    Patient with preliminary blood culture showing gram-positive cocci in clusters, suspicious for Staphylococcus aureus  Case discussed with Infectious Disease, change antibiotics to IV cefazolin 1 g Q 12, add Flagyl 500 mg IV Q 8  Continue vancomycin  Acute hepatitis panel ordered, check EBV serology  Stress-induced cardiomyopathy   Assessment & Plan    Patient with reduced ejection fraction of approximately 20% compared to baseline of 50% on prior echo  Case discussed with Cardiology, continue supportive care, no inotropic agents are recommended at this time  Continue with heparin drip  This is most likely a stress-induced cardiomyopathy from underlying infection, plan is transfer to higher level of care, continue supportive care, IV pressors to maintain mean arterial pressure greater than 65  Possible component of cardiogenic shock  Acute respiratory failure with hypoxia [facility])   Assessment & Plan    Patient developed increasing confusion, increased work of breathing in the emergency room and was intubated yesterday evening on 1/23/2019  Continue ventilator support at current settings  NSTEMI (non-ST elevated myocardial infarction) [facility])   Assessment & Plan    NSTEMI type 2 in the setting of severe sepsis/septic shock, possible cardiogenic shock  Continue with IV heparin  Patient had normal coronary anatomy in 2014 prior to aortic valve replacement  Non-traumatic rhabdomyolysis   Assessment & Plan    Supportive care, IV fluid hydration, monitor renal status, Nephrology consult when available       APTRICK (acute kidney injury) [facility])   Assessment & Plan    Possible component of ATN, patient also with markedly elevated CPK level consistent with non traumatic rhabdomyolysis  Acute encephalopathy   Assessment & Plan    Acute confusion prior to intubation    Ct Head Without Contrast-- 1/23/2019-"Impression: Area of low density/diminished gray-white distinction in the inferior right frontal lobe which is probably artifactual, however, 12 hour follow-up reassessment suggested as this could be subacute ischemia  No hemorrhage  Dilated intraorbital vessels bilaterally perhaps venous varicosities  No gross evidence of pathology of the cavernous sinus "  Ammonia level is normal        Atrial fibrillation [facility])   Assessment & Plan    New diagnosis of AFib, no prior history, patient currently on heparin drip due to NSTEMI type 2, continue with heparin  History of aortic valve replacement with bioprosthetic valve   Assessment & Plan    Follow-up with Cardiology recs     Increased BMI   Assessment & Plan    BMI 44 79     Left bundle branch block   Assessment & Plan    Patient with prior history of left bundle branch block         Discharging Physician / Practitioner: Adalgisa Castañeda DO  PCP: Vj Hardin DO  Admission Date:   Admission Orders     Ordered        01/23/19 1828  Inpatient Admission (expected length of stay for this patient is greater than two midnights)  Once             Discharge Date: 01/24/19    Resolved Problems  Date Reviewed: 1/24/2019    None        VTE Pharmacologic Prophylaxis:   Pharmacologic: Heparin Drip  Mechanical VTE Prophylaxis in Place: Yes    Patient Centered Rounds: I have performed bedside rounds with nursing staff today      Discussions with Specialists or Other Care Team Provider:  Case discussed today with Infectious Disease, General surgical team, plan of care discussed with Cardiology, plan of care discussed with excepting physician Dr Tavo Alvarado    Education and Discussions with Family / Patient:  Family updated today via telephone    Time Spent for Care: Critical care time today 70 min, not including time for procedures  Current Length of Stay: 1 day(s)    Current Patient Status: Inpatient         Reason for Admission:  Please refer to admitting history physical    Hospital Course:     Kofi Heath is a 60 y o  male patient who originally presented to the hospital on 1/23/2019 due to acute illness as described in admitting history and physical     Please see above list of diagnoses and related plan for additional information  Condition at Discharge: critical     Discharge Day Visit / Exam:     Subjective:  Patient currently sedated on ventilator  Vitals: Blood Pressure: 114/70 (01/24/19 1110)  Pulse: 91 (01/24/19 1110)  Temperature: 99 8 °F (37 7 °C) (01/24/19 1110)  Temp Source: Tympanic (01/24/19 1110)  Respirations: 21 (01/24/19 1110)  Weight - Scale: (!) 154 kg (339 lb 8 1 oz) (01/24/19 0542)  SpO2: 96 % (01/24/19 1110)  Exam:   Physical Exam   Constitutional: He appears well-developed and well-nourished  No distress  Pulmonary/Chest: Effort normal    Currently sedated on ventilator, lungs slightly diminished but no wheezes rales or rhonchi   Abdominal: Soft  Bowel sounds are normal  He exhibits no distension  There is no tenderness  Musculoskeletal: He exhibits edema  He exhibits no tenderness or deformity  Skin: No rash noted  No erythema  There is pallor     Patient has some scabbed areas on bilateral upper arms, also with scabbed areas without erythema purulent drainage on posterior thorax     Family notified of transfer    Disposition:     [address] Transfer to [facility], excepting physician Dr Basurto Self    For Discharges to [facility]:   · Not Applicable to this Patient - Not Applicable to this Patient    Planned Readmission: no     ** Please Note: This note has been constructed using a voice recognition system **

## 2019-01-24 NOTE — PROCEDURES
Assistants: n/s     Anesthesia: Meds; anesthesia local: 1% lidocaine     Procedure:  Ultrasound-guided left IJ triple-lumen catheter placement     Blood Loss:  5 cc     Complications: none     Specimen: none     Description of Procedure:     General surgery was consulted to place a triple-lumen central lying due to the need for pressors and a patient with septic shock  A previous right IJ catheter was attempted by the medical service but unfortunately line was not working and on x-ray appeared to be too superficial      At bedside the patient was placed in  Trendelenburg position and ultrasound probe was used on the left side of the neck which clearly visualized the left internal jugular  This area was marked with a surgical marking pen  The patient was prepped and draped in usual sterile fashion  A timeout was performed verifying the correct patient, procedure, position, and site  Under ultrasonic guidance a finder needle was inserted the right internal jugular vein and dark venous blood was aspirated  A guidewire was inserted and the needle removed  A 11 blade scalpel was then used to make a small skin incision above the wire  A dilator was then placed to dilate the tract  The central venous catheter triple-lumen was then placed using Seldinger technique over the guidewire  All ports were then checked to ensure the aspirate and flush appropriately  The triple lumen catheter was then sewn to the skin using nylon suture  A dry sterile dressing was then placed  Patient tolerated procedure well  A chest x-ray was then ordered to verify correct position and rule out possible pneumothorax

## 2019-01-24 NOTE — ASSESSMENT & PLAN NOTE
New diagnosis of AFib, no prior history, patient currently on heparin drip due to NSTEMI type 2, continue with heparin

## 2019-01-24 NOTE — ASSESSMENT & PLAN NOTE
NSTEMI type 2  Troponin elevated 14 20 trend troponin with EKG  EKG sinus tachycardia with a left bundle-branch block-consider Cardizem drip  Heparin drip infusing per protocol  Cardiology consulted

## 2019-01-24 NOTE — UTILIZATION REVIEW
Initial Clinical Review  Admission: Date/Time/Statement: 1/23/19 @ 1828   Orders Placed This Encounter   Procedures    Inpatient Admission (expected length of stay for this patient is greater than two midnights)     Standing Status:   Standing     Number of Occurrences:   1     Order Specific Question:   Admitting Physician     Answer:   Giorgio Swanson     Order Specific Question:   Level of Care     Answer:   Critical Care [15]     Order Specific Question:   Estimated length of stay     Answer:   More than 2 Midnights     Order Specific Question:   Certification     Answer:   I certify that inpatient services are medically necessary for this patient for a duration of greater than two midnights  See H&P and MD Progress Notes for additional information about the patient's course of treatment  ED: Date/Time/Mode of Arrival:   ED Arrival Information     Expected Arrival Acuity Means of Arrival Escorted By Service Admission Type    1/23/2019 1/23/2019 15:26 Emergent [address] [place] [address] Drive Ambulance General Medicine Emergency    Arrival Complaint    shortness of breath      Chief Complaint:   Chief Complaint   Patient presents with    Chest Pain     Patient states chest pain since yesterday  EMS states patient was in SVT at arrival, gave 6mg of Adenosine IV, and SVT broke  Patient denies any pain at this time  History of Illness:   60 yr male BIBA from home for evaluation of chest pain sob  Pt not feeling well since yesterday, left work early because suspected fever (felt warm and skin was red) per wife  Felt fine after taking a nap yesterday evening, ate dinner normal  Did not c/o any pain yesterday however pt states he had chest pain yesterday too just did not say anything  Today c/o chest pain and trouble breathing  Called EMS   Per EMS pt was kneeling forward on all 4s on his front porch upon their arrival in the cold for about 5-10 minutes when they got him inside the ambulance his pulse ox was 80% on room air and HR was 170s  ALS met them, 12 lead confirmed SVT, was treated with 6mg adenosine x 1 with resolution of SVT rate 174 to a sinus tachycardia rate 120  ED Vital Signs:   ED Triage Vitals   Temperature Pulse Respirations Blood Pressure SpO2   01/23/19 1533 01/23/19 1533 01/23/19 1533 01/23/19 1533 01/23/19 1533   99 °F (37 2 °C) (!) 116 (!) 26 128/79 94 %      Temp Source Heart Rate Source Patient Position - Orthostatic VS BP Location FiO2 (%)   01/23/19 1533 01/23/19 1533 01/23/19 1600 01/23/19 1600 01/23/19 1750   Temporal Monitor Sitting Right arm 50      Pain Score       01/23/19 1533       No Pain        Wt Readings from Last 1 Encounters:   01/24/19 (!) 154 kg (339 lb 8 1 oz)   Vital Signs (abnormal):   T 103 1  , 123, 125, 125, 129  RR 26, 20, 24, 22, 24, 59  Pertinent Labs/Diagnostic Test Results:   WBC 16 01  PT 16 9 INR 1 45 ANION GAP 14 BUN 34 CR 2 63 GLUC 146 CA 8 0  ALT 90 ALB 3 3 TBILI 1 40 GFR 25 LACTIC ACID 5 5 BNP 25,548  TROP 14 20, 24 29, 36 61, >40  U/A+NITRITE, PROT, UROBILINOGEN, BLOOD, BACTERIA  pH, Arterial 7 350 - 7 450 7 400     pCO2, Arterial 36 0 - 44 0 mm Hg 35 1   L    pO2, Arterial 75 0 - 129 0 mm Hg 261 7   H    HCO3, Arterial 22 0 - 28 0 mmol/L 21 3   L    Base Excess, Arterial mmol/L -2 8     O2 Content, Arterial 16 0 - 23 0 mL/dL 21 5     O2 HGB,Arterial  94 0 - 97 0 % 98 7   H    SOURCE  Radial, Left     MARLON TEST  Yes     Vent Type- AC  AC     AC Rate  18     Tidal Volume ml 500     Inspired Air (FIO2)  100     PEEP  7     CXR=Findings of volume overload including pulmonary vascular congestion and mild alveolar edema  No effusions demonstrated  CT HEAD=Dilated intraorbital vessels bilaterally perhaps venous varicosities  No gross evidence of pathology of the cavernous sinus  CXR=1  Increasing symmetric consolidative opacities  This most likely represents increasing alveolar edema  Correlate with BNP, if needed    Multifocal pneumonia is felt to be less likely given presence of pulmonary vascular congestion  2   Appropriately positioned endotracheal tube    CT CHEST, A/P=Patchy consolidative changes at the bilateral lung bases, likely secondary to atelectasis, or alternatively, in the appropriate clinical context, acute infiltrate, possibly secondary to aspiration   EKG=Rhythm: sinus tachycardia    Ectopy:     Ectopy: none    QRS:     QRS axis:  Normal  Conduction:     Conduction: abnormal      Abnormal conduction: complete LBBB    ST segments:     ST segments:  Normal  T waves:     T waves: normal    ED Treatment:   Medication Administration from 01/23/2019 1525 to 01/23/2019 2214       Date/Time Order Dose Route Action Action by Comments     01/23/2019 1548  EMS REPLENISHMENT MED 0  Does not apply Given to EMS Dea Burciaga RN      01/23/2019 1807 cefepime (MAXIPIME) IVPB (premix) 2,000 mg   Intravenous Canceled Entry Dea Burciaga RN      01/23/2019 1950 sodium chloride 0 9 % bolus 2,397 mL 0 mL/kg Intravenous Stopped Dea Burciaga RN      01/23/2019 1702 sodium chloride 0 9 % bolus 2,397 mL 2,397 mL Intravenous New [address] [facility]      01/23/2019 1704 furosemide (LASIX) injection 60 mg 60 mg Intravenous Given Eliel Moffett RN      01/23/2019 1817 heparin (porcine) injection 4,000 Units 4,000 Units Intravenous Given Dea Burciaga RN      01/23/2019 1815 heparin (porcine) 25,000 units in 250 mL infusion (premix) 11 1 Units/kg/hr Intravenous [address] Dea Burciaga RN      01/23/2019 1812 cefepime (MAXIPIME) 2,000 mg in dextrose 5 % 50 mL IVPB 0 mg Intravenous Stopped Dea Burciaga RN      01/23/2019 1742 cefepime (MAXIPIME) 2,000 mg in dextrose 5 % 50 mL IVPB 2,000 mg Intravenous New Bag Eliel Moffett RN      01/23/2019 1749 metoprolol (LOPRESSOR) injection 5 mg 5 mg Intravenous Given Eliel Moffett RN      01/23/2019 1951 etomidate (AMIDATE) 2 mg/mL injection 20 mg Intravenous Given Germain Gitelman, RN      01/23/2019 1952 succinylcholine (ANECTINE) 20 mg/mL injection 100 mg Intravenous Given Gerry Moran RN      01/23/2019 1957 LORazepam (ATIVAN) 2 mg/mL injection 2 mg 2 mg Intravenous Given Jeannette Schuster RN      01/23/2019 2020 propofol (DIPRIVAN) 1000 mg in 100 mL infusion (premix) 15 mcg/kg/min Intravenous Rate/Dose Change Gerry Moran RN      01/23/2019 2010 propofol (DIPRIVAN) 1000 mg in 100 mL infusion (premix) 10 mcg/kg/min Intravenous Rate/Dose Change Gerry Moran RN      01/23/2019 2004 propofol (DIPRIVAN) 1000 mg in 100 mL infusion (premix) 5 mcg/kg/min Intravenous New [address] [place] [address]      01/23/2019 2205 LORazepam (ATIVAN) 2 mg/mL injection 2 mg 2 mg Intravenous Given Willie Card RN       Past Medical/Surgical History:   Past Medical History:   Diagnosis Date    Allergic rhinitis     Finger fracture, right     Hemorrhoids     Hyperlipidemia     Hypertension    Admitting Diagnosis: Res[facility] failure ([facility] ) [J96 90]  SVT (supraventricular tachycardia) ([facility]) [I47 1]  UTI (urinary tract infection) [N39 0]  SOB (shortness of breath) [R06 02]  Encephalopathy acute [G93 40]  Transaminitis [R74 0]  Elevated troponin [R74 8]  Abnormal head CT [R93 0]  PATRICK (acute kidney injury) ([facility] ) [N17 9]  Elevated brain natriuretic peptide (BNP) level [R79 89]  Severe sepsis ([place] [address] ) [A41 9, R65 20]  Age/Sex: 60 y o  male  Assessment/Plan:   NSTEMI (non-ST elevated myocardial infarction) ([facility] )   Assessment & Plan     NSTEMI type 2  Troponin elevated 14 20 trend troponin with EKG  EKG sinus tachycardia with a left bundle-branch block-consider Cardizem drip  Heparin drip infusing per protocol  Cardiology consulted     Acute encephalopathy   Assessment & Plan     Acute confusion prior to intubation  Most likely secondary to sepsis  Ammonia level - WDL     Sepsis ([facility])   Assessment & Plan     Elevated lactic acid  WBC 16 01  Blood cultures pending  Urine cultures pending  Urine antigens ordered  Flu swab sent  Vancomycin and cefepime ordered  Tamiflu ordered  Admit to ICU-continuous cardiopulmonary monitoring    Admission Orders:  ICU  VENTED  LABS PER IV HEPARIN GTT PROTOCOL  CONSULT CARDIO  ASPIRATION PRECAUTIONS  DROPLET ISOLATION  VENODYNES  MACIAS CATH  CONSULT SURGERY  Scheduled Meds:   Current Facility-Administered Medications:  acetaminophen 650 mg Rectal Q6H PRN   albuterol 2 5 mg Nebulization Q4H PRN   aspirin 325 mg Oral Daily   cefepime 2 g (MAXIPIME) 50 mL IVPB 2,000 mg Intravenous Q24H   LORazepam 0 5 mg Intravenous Once   oseltamivir 30 mg Oral Q12H [address]   thiamine 200 mg Intravenous Daily   vancomycin 12 5 mg/kg (Adjusted) Intravenous Q12H   Continuous Infusions:   dexmedetomidine 0 1-0 7 mcg/kg/hr Last Rate: 0 7 mcg/kg/hr (01/24/19 0828)   heparin (porcine) 3-20 Units/kg/hr (Order-Specific) Last Rate: 13 1 Units/kg/hr (01/24/19 0743)   norepinephrine 1-30 mcg/min Last Rate: 24 mcg/min (01/24/19 0743)   sodium chloride 200 mL/hr Last Rate: 200 mL/hr (01/24/19 0315)   PRN Meds:   acetaminophen    albuterol    heparin (porcine)    heparin (porcine)  Network Utilization Review Department  Phone: [phone]; Fax [phone]  [email or web address]  ATTENTION: Please call with any questions or concerns to [phone] and carefully listen to the prompts so that you are directed to the right person  Send all requests for admission clinical reviews, approved or denied determinations and any other requests to fax [phone]   All voicemails are confidential

## 2019-01-24 NOTE — PROCEDURES
Temporary HD Catheter  Date/Time: 1/24/2019 6:19 PM  Performed by: Brian Urena  Authorized by: Silvia ROBIN     Patient location:  Bedside  Other Assisting Provider: Yes (comment)    Consent:     Consent obtained:  Written    Consent given by:  Spouse    Risks discussed:  Arterial puncture, bleeding, infection and pneumothorax    Alternatives discussed:  No treatment  Universal protocol:     Procedure explained and questions answered to patient or proxy's satisfaction: yes      Relevant documents present and verified: yes      Test results available and properly labeled: yes      Radiology Images displayed and confirmed  If images not available, report reviewed: yes      Required blood products, implants, devices, and special equipment available: yes      Site/side marked: yes      Immediately prior to procedure, a time out was called: yes      Patient identity confirmed:  Arm band and hospital-assigned identification number  Pre-procedure details:     Hand hygiene: Hand hygiene performed prior to insertion      Sterile barrier technique: All elements of maximal sterile technique followed      Skin preparation:  2% chlorhexidine, alcohol and ChloraPrep    Skin preparation agent: Skin preparation agent completely dried prior to procedure    Indications:     Central line indications: dialysis      Site selection rationale:  Open  Sedation:     Sedation type: Anxiolysis  Anesthesia (see MAR for exact dosages):      Anesthesia method:  Local infiltration    Local anesthetic:  Lidocaine 1% w/o epi  Procedure details:     Location:  Right internal jugular    Vessel type: vein      Laterality:  Right    Approach: percutaneous technique used      Patient position:  Trendelenburg    Catheter type:  Double lumen    Catheter size:  13 5 Fr    Catheter length:  16 cm    Landmarks identified: yes      Ultrasound guidance: yes      Sterile ultrasound techniques: Sterile gel and sterile probe covers were used      Number of attempts:  1    Successful placement: yes      Vessel of catheter tip end:  SVC  Post-procedure details:     Post-procedure:  Line sutured and dressing applied    Assessment:  Blood return through all ports, free fluid flow, no pneumothorax on x-ray and placement verified by x-ray    Post-procedure complications: none      Patient tolerance of procedure:   Tolerated well, no immediate complications    Observer: Yes      Observer name:  [facility]

## 2019-01-24 NOTE — CONSULTS
Consultation - Cardiology   Maryjo Alfredo 60 y o  male MRN: [id number]  Unit/Bed#:  Encounter: [id number]    Consults      Physician Requesting Consult: Kate Araujo DO  Reason for Consult / Principal Problem: NSTEMI    1  Type II Myocardial infarction  2  Shock, likely multifactorial: septic and cardiogenic  3  Biventricular failure   4  Sepsis, unclear etiology with gram positive bacteremia   5  New onset Atrial fibrillation  6  PATRICK  7  Acute hypoxemic respiratory failure   8  LBBB  9  Aortic stenosis S/p bioprosthetic AVR  10  HTN  11  Hyperlipidemia   12  Normal coronary arteries by left heart cath 2014    Suspect this is likely a type II MI given his acute illness  He had normal coronary arteries on cardiac catheterization in 2014  He had no EKG changes that were suggestive of ischemia  His LBBB is chronic  An echo was performed and read at bedside by cardiologist Dr Prasanna Corderor  There is significant left ventricular and right ventricular dysfunction  There was diffuse hypokinesis which suggests an ischemic origin is less likely  It does not appear that the gradient across his aortic valve has changed much since last echo in June 2018  It is unclear if his shock is cardiac or septic in origin  His estimated cardiac output via echo was determined to be somewhat low  His extremities are also cool and his urine output has been poor overnight  He does have gram positive cocci on preliminary blood cultures for which he is receiving IV antibiotics  Due to his history of a bioprosthetic valve, he may require CHON to rule out endocarditis as the source of sepsis  He has no prior history of atrial fibrillation  Onset likely in the setting of acute illness which may resolve  Further management for this can be discussed once patient is stabilized  He is currently maintaining a systolic BP of 879  He has been receiving levophed       Given his multifactorial illness, he should be transferred to [facility]  [facility]  History of Present Illness   HPI: Pauline Dos Santos is a 60y o  year old male who has a history of aortic stenosis s/p bioprosthetic AVR, HTN, hyperlipidemia, and LBBB who follows with cardiologist Dr Bob Heredia at [facility]  The following history is based prior notes as the patient is currently intubated due to respiratory failure  Patient apparently presented to the emergency department yesterday via EMS with a chief complaint of chest pain and shortness of breath  The day prior patient left work early due to suspected fever  He apparently began to develop chest 2 days ago as well once he got home  According take EMS on arrival the patient was kneeling forward on all fours outside on his porch " He was found to be in SVT and was treated with 6 mg of adenosine once and converted to sinus tachycardia  EKG at this time showed a left bundle branch block with no ST-T changes  He was given high-flow oxygen and sublingual nitroglycerin with some improvement in his breathing and resolution of his chest pain  Once admitted the patient was found to be septic and was placed on empiric antibiotics  Overnight the patient's respiratory status continued to decline and he was placed on a ventilator  He became hypotensive and now requires vasopressors  His troponin was markedly elevated and the last 2 sets have been above 40  He was also found to be in atrial fibrillation for which he does not have a previous history  He follows regularly with [facility] [address] [facility] for his history of aortic stenosis and prior bioprosthetic valve replacement  Last echocardiogram showed grade 2 diastolic dysfunction and an EF of 50%  He had a cardiac catheterization in 2014 which revealed no coronary artery disease        Review of Systems   Unable to perform ROS: intubated     Historical Information   Past Medical History:   Diagnosis Date    Allergic rhinitis last assessed 9/12/12    Finger fracture, right     Closed fx of the middle phalanx of the right 5th finger  last assessed 1/30/14    Hemorrhoids     last assessed 2/10/14    Hyperlipidemia     Hypertension      Past Surgical History:   Procedure Laterality Date    AORTIC VALVE REPLACEMENT  07/30/2014    AVR with 25mm [facility] Ease bovine pericardial valve    CARDIAC CATHETERIZATION  07/18/2014    SLB left main-normal, circumflex-normal, ramus intermedius-normal, RCA was large and dominant giving rise to the PDA & a large posterolateral branch  No disease  last assessed 8/19/14     KNEE CARTILAGE SURGERY Left     medial meniscus tear     TESTICLE SURGERY      TONSILLECTOMY AND ADENOIDECTOMY      TOOTH EXTRACTION       History   Alcohol Use No     Comment: ocassion /never drank alcohol per [employer]      History   Drug Use No     History   Smoking Status    Never Smoker   Smokeless Tobacco    Never Used     Family History: non-contributory    Meds/Allergies   all current active meds have been reviewed    dexmedetomidine 0 1-0 7 mcg/kg/hr Last Rate: 0 7 mcg/kg/hr (01/24/19 0828)   heparin (porcine) 3-20 Units/kg/hr (Order-Specific) Last Rate: 13 1 Units/kg/hr (01/24/19 0743)   norepinephrine 1-30 mcg/min Last Rate: 24 mcg/min (01/24/19 0743)   sodium chloride 200 mL/hr Last Rate: 200 mL/hr (01/24/19 0315)       Allergies   Allergen Reactions    Penicillins Rash       Objective   Vitals: Blood pressure (!) 89/55, pulse 97, temperature (!) 102 8 °F (39 3 °C), temperature source Tympanic, resp  rate (!) 23, weight (!) 154 kg (339 lb 8 1 oz), SpO2 95 %  , Body mass index is 44 79 kg/m² , Orthostatic Blood Pressures      Most Recent Value   Blood Pressure   89/55 filed at 01/24/2019 0800   Patient Position - Orthostatic VS  Lying filed at 01/24/2019 4849        Systolic (99BPC), AXC:806 , Min:58 , BYN:266     Diastolic (55JOH), TWE:98, Min:38, Max:162      Intake/Output Summary (Last 24 hours) at 01/24/19 0841  Last data filed at 01/24/19 0620   Gross per 24 hour   Intake             2447 ml   Output              471 ml   Net             1976 ml       Weight (last 2 days)     Date/Time   Weight    01/24/19 0542  (!)  154 (339 51)    01/23/19 1533  (!)  154 (442 63)              Invasive Devices     Peripheral Intravenous Line            Peripheral IV 01/23/19 Left Antecubital less than 1 day    Peripheral IV 01/23/19 Right Antecubital less than 1 day    Peripheral IV 01/24/19 Right Hand less than 1 day          Drain            NG/OG/Enteral Tube Orogastric 14 Fr Right mouth less than 1 day    Urethral Catheter Latex less than 1 day          Airway            ETT  Cuffed 7 5 mm less than 1 day                  Physical Exam   Constitutional: He appears well-developed  No distress  Ill appearing, intubated   HENT:   Head: Normocephalic and atraumatic  Neck: Normal range of motion  Carotid bruit is not present  Cardiovascular: Normal rate, S1 normal and S2 normal   An irregularly irregular rhythm present  Exam reveals decreased pulses  No murmur heard  Pulses:       Radial pulses are 1+ on the left side  Dorsalis pedis pulses are 2+ on the right side, and 1+ on the left side  Pulmonary/Chest: No respiratory distress  He has no wheezes  He has no rales  Intubated    Musculoskeletal: He exhibits no edema  Skin: Skin is dry  He is not diaphoretic  No erythema  Lower extremities cool to touch  Patient is actively being cooled due to fever  Psychiatric: He has a normal mood and affect  His behavior is normal    Vitals reviewed            Laboratory Results:    Results from last 7 days  Lab Units 01/24/19  0502 01/24/19  0207 01/23/19  2251   TROPONIN I ng/mL >40 00* >40 00* 36 61*       CBC with diff:   Results from last 7 days  Lab Units 01/24/19  0502 01/23/19  2303 01/23/19  1600   WBC Thousand/uL 12 10* 12 27* 16 01*   HEMOGLOBIN g/dL 13 3 14 4 14 6   HEMATOCRIT % 43 4 46 4 47 0   MCV fL 87 85 85   PLATELETS Thousands/uL 81* 83* 112*   MCH pg 26 5* 26 4* 26 5*   MCHC g/dL 30 6* 31 0* 31 1*   RDW % 16 4* 16 0* 15 8*   MPV fL 13 3* 11 4 11 7   NRBC AUTO /100 WBCs  --   --  0         CMP:  Results from last 7 days  Lab Units 19  0502 19  02019  1600   POTASSIUM mmol/L 3 7 3 6 4 0 3 9 3 9   CHLORIDE mmol/L 106 104 103 102 99*   CO2 mmol/L 22 22 21 25 25   BUN mg/dL 45* 44* 41* 36* 34*   CREATININE mg/dL 3 70* 3 06* 2 87* 2 29* 2 63*   CALCIUM mg/dL 7 0* 7 5* 7 4* 8 2* 8 0*   AST U/L  --   --   --   --  242*   ALT U/L  --   --   --   --  90*   ALK PHOS U/L  --   --   --   --  89   EGFR ml/min/1 73sq m 17 21 23 30 25         BMP:  Results from last 7 days  Lab Units 19  0502 19  02019  1600   POTASSIUM mmol/L 3 7 3 6 4 0 3 9 3 9   CHLORIDE mmol/L 106 104 103 102 99*   CO2 mmol/L 22 22 21 25 25   BUN mg/dL 45* 44* 41* 36* 34*   CREATININE mg/dL 3 70* 3 06* 2 87* 2 29* 2 63*   CALCIUM mg/dL 7 0* 7 5* 7 4* 8 2* 8 0*       BNP:  No results for input(s): BNP in the last 72 hours      Magnesium:   Results from last 7 days  Lab Units 19  0502 19  1600   MAGNESIUM mg/dL 2 0 2 0       Coags:   Results from last 7 days  Lab Units 19  0753 19  0041 19  1600   PTT seconds 51* 48* 34   INR   --   --  1 45*       TSH:       Hemoglobin A1C       Lipid Profile:         Cardiac testing:   Results for orders placed during the hospital encounter of 18   Echo complete with contrast if indicated    Narrative [address] [address]  [phone]    Transthoracic Echocardiogram  2D, M-mode, Doppler, and Color Doppler    Study date:  2018    Patient: Dakotah Coleman  MR number: [id number]  Account number: [de-identified]  : 1960  Age: 60 years  Gender: Male  Status: Outpatient  Location: Echo lab  Height: 73 in  Weight: 340 3 lb  BP: 72/ 341 mmHg    Indications: aortic valve disorder Shortness of breath  Diagnoses: 424 1 - AORTIC VALVE DISORDER    Sonographer:  Cecille Doss RDCS  Primary Physician:  Juan J Pacheco DO  Referring Physician:  Remberto Harper DO  Group:  [id number]  Interpreting Physician:  Remberto Harper DO    SUMMARY    LEFT VENTRICLE:  Systolic function was at the lower limits of normal  Ejection fraction was estimated to be 50 %  This study was inadequate for the evaluation of regional wall motion  Features were consistent with a pseudonormal left ventricular filling pattern, with concomitant abnormal relaxation and increased filling pressure (grade 2 diastolic dysfunction)  VENTRICULAR SEPTUM:  There was "bounce" motion  RIGHT VENTRICLE:  The ventricle was mildly to moderately dilated  LEFT ATRIUM:  The atrium was mildly dilated  RIGHT ATRIUM:  The atrium was mildly dilated  MITRAL VALVE:  There was mild regurgitation  AORTIC VALVE:  A bioprosthesis was present  Mean gradient 21mmHg, simialr to 2017    TRICUSPID VALVE:  There was mild regurgitation  Estimated peak PA pressure was 44 mmHg  HISTORY: PRIOR HISTORY: AVR    PROCEDURE: The procedure was performed in the echo lab  This was a routine study  The transthoracic approach was used  The study included complete 2D imaging, M-mode, complete spectral Doppler, and color Doppler  The heart rate was 80 bpm,  at the start of the study  Image quality was adequate  LEFT VENTRICLE: Size was normal  Systolic function was at the lower limits of normal  Ejection fraction was estimated to be 50 %  This study was inadequate for the evaluation of regional wall motion  DOPPLER: Features were consistent with  a pseudonormal left ventricular filling pattern, with concomitant abnormal relaxation and increased filling pressure (grade 2 diastolic dysfunction)  VENTRICULAR SEPTUM: There was "bounce" motion      RIGHT VENTRICLE: The ventricle was mildly to moderately dilated  Systolic function was normal  Wall thickness was normal     LEFT ATRIUM: The atrium was mildly dilated  RIGHT ATRIUM: The atrium was mildly dilated  MITRAL VALVE: Valve structure was normal  There was normal leaflet separation  DOPPLER: The transmitral velocity was within the normal range  There was no evidence for stenosis  There was mild regurgitation  AORTIC VALVE: A bioprosthesis was present  Mean gradient 21mmHg, simialr to 2017 DOPPLER: There was no significant regurgitation  TRICUSPID VALVE: The valve structure was normal  There was normal leaflet separation  DOPPLER: The transtricuspid velocity was within the normal range  There was no evidence for stenosis  There was mild regurgitation  Estimated peak PA  pressure was 44 mmHg  PULMONIC VALVE: Leaflets exhibited normal thickness, no calcification, and normal cuspal separation  DOPPLER: The transpulmonic velocity was within the normal range  There was no regurgitation  PERICARDIUM: There was no pericardial effusion  The pericardium was normal in appearance  AORTA: The root exhibited normal size  SYSTEMIC VEINS: IVC: The inferior vena cava was not well visualized      SYSTEM MEASUREMENT TABLES    2D  %FS: 43 08 %  AV Diam: 3 38 cm  EDV(Teich): 144 72 ml  EF(Teich): 73 68 %  ESV(Teich): 38 08 ml  IVSd: 1 65 cm  LA Area: 25 05 cm2  LA Diam: 4 3 cm  LVEDV MOD A4C: 215 06 ml  LVEF MOD A4C: 44 93 %  LVESV MOD A4C: 118 43 ml  LVIDd: 5 46 cm  LVIDs: 3 11 cm  LVLd A4C: 9 93 cm  LVLs A4C: 8 33 cm  LVOT Diam: 2 15 cm  LVPWd: 1 44 cm  RA Area: 22 59 cm2  RV DIAM: 4 66 cm  SV MOD A4C: 96 63 ml  SV(Teich): 106 63 ml    CW  AV VTI: 68 41 cm  AV Vmax: 3 06 m/s  AV Vmax: 3 m/s  AV Vmean: 2 09 m/s  AV maxP 45 mmHg  AV maxP 05 mmHg  AV meanP 89 mmHg  PV Vmax: 1 23 m/s  PV maxP 05 mmHg  TR Vmax: 3 37 m/s  TR maxP 38 mmHg    MM  TAPSE: 1 87 cm    PW  MARY (VTI): 0 91 cm2  MARY Vmax: 1 cm2  MARY Vmax: 0 98 cm2  MARY Vmax, Pt: 0 98 cm2  MARY Vmax, Pt: 0 96 cm2  E': 0 09 m/s  E/E': 13 28  LVOT VTI: 17 22 cm  LVOT Vmax: 0 83 m/s  LVOT Vmax: 0 81 m/s  LVOT Vmean: 0 57 m/s  LVOT maxP 76 mmHg  LVOT maxP 62 mmHg  LVOT meanP 5 mmHg  MV A Deep: 1 03 m/s  MV Dec Armstrong: 5 27 m/s2  MV DecT: 216 84 ms  MV E Deep: 1 14 m/s  MV E/A Ratio: 1 11  RVOT Vmax: 0 52 m/s  RVOT maxP 08 mmHg    [facility] Accredited Echocardi[facility] Laboratory    Prepared and electronically signed by    Jayne Jasso DO  Signed 2018 14:50:13       No results found for this or any previous visit  No results found for this or any previous visit  No results found for this or any previous visit  Imaging: I have personally reviewed pertinent reports  Ct Chest Abdomen Pelvis Wo Contrast    Result Date: 2019  Narrative: CT CHEST, ABDOMEN AND PELVIS WITHOUT IV CONTRAST INDICATION:   Sepsis  COMPARISON:  None  TECHNIQUE: CT examination of the chest, abdomen and pelvis was performed without intravenous contrast   Axial, sagittal, and coronal 2D reformatted images were created from the source data and submitted for interpretation  Radiation dose length product (DLP) for this visit:  2085 mGy-cm   This examination, like all CT scans performed in the [facility], was performed utilizing techniques to minimize radiation dose exposure, including the use of iterative reconstruction and automated exposure control  Enteric contrast was administered  FINDINGS: CHEST LUNGS:  Patchy consolidative changes seen at the bilateral lung bases, consistent with atelectasis although infiltrate would have to be excluded clinically  Additional segmental atelectasis along the right major fissure     There is no tracheal or endobronchial lesion  PLEURA:  Unremarkable  HEART/GREAT VESSELS:  Atherosclerotic changes are noted in thoracic aorta noted  Status post valve replacement   MEDIASTINUM AND JAMAR:  Unremarkable  CHEST WALL AND LOWER NECK:   Unremarkable  ABDOMEN LIVER/BILIARY TREE:  Indeterminate 3 3 cm hypodensity in the left hepatic lobe  Additional vague 1 6 cm hypodense focus more inferiorly within the left hepatic lobe  GALLBLADDER:  No calcified gallstones  No pericholecystic inflammatory change  SPLEEN:  Unremarkable  PANCREAS:  Unremarkable  ADRENAL GLANDS:  Unremarkable  KIDNEYS/URETERS:  One or more simple renal cyst(s) is noted  Otherwise unremarkable kidneys  No hydronephrosis  STOMACH AND BOWEL:  Unremarkable  APPENDIX:  No findings to suggest appendicitis  ABDOMINOPELVIC CAVITY:  No ascites or free intraperitoneal air  No lymphadenopathy  VESSELS:  Atherosclerotic changes are present  No evidence of aneurysm  PELVIS REPRODUCTIVE ORGANS:  Unremarkable for patient's age  URINARY BLADDER:  Turner catheter is in place, which limits evaluation    ABDOMINAL WALL/INGUINAL REGIONS:  Unremarkable  OSSEOUS STRUCTURES:  No acute fracture or destructive osseous lesion  Impression: Patchy consolidative changes at the bilateral lung bases, likely secondary to atelectasis, or alternatively, in the appropriate clinical context, acute infiltrate, possibly secondary to aspiration Workstation performed: [id number]     Xr Chest 1 View Portable    Result Date: 1/24/2019  Narrative: CHEST INDICATION:   Postintubation  COMPARISON:  1/23/2019 EXAM PERFORMED/VIEWS:  XR CHEST PORTABLE FINDINGS:  Appropriately positioned endotracheal tube tip measuring 3 cm from the raad  Intact median sternotomy wires  Aortic valvular replacement  Heart shadow is enlarged but unchanged from prior exam   Persistent pulmonary vascular engorgement and cephalization  Increasing patchy bilateral consolidations  No pneumothorax or effusion  Bones are unremarkable  Impression: 1  Increasing symmetric consolidative opacities  This most likely represents increasing alveolar edema  Correlate with BNP, if needed  Multifocal pneumonia is felt to be less likely given presence of pulmonary vascular congestion  2   Appropriately positioned endotracheal tube  Workstation performed: [id number]     Xr Chest 1 View Portable    Result Date: 1/23/2019  Narrative: CHEST INDICATION:   Chest pain, shortness of breath, and SVT  COMPARISON:  10/31/2014 EXAM PERFORMED/VIEWS:  XR CHEST PORTABLE FINDINGS:  Intact median sternotomy wires without dehiscence  Dilated cardiomyopathy  Pulmonary vascular engorgement and cephalization  Patchy central opacities suggesting alveolar edema  No pneumothorax or effusion  Bones are unremarkable  Impression: Findings of volume overload including pulmonary vascular congestion and mild alveolar edema  No effusions demonstrated  Workstation performed: [id number]     Ct Head Without Contrast    Result Date: 1/23/2019  Narrative: CT BRAIN - WITHOUT CONTRAST INDICATION:   abnormal mental status  COMPARISON:  None  TECHNIQUE:  CT examination of the brain was performed  In addition to axial images, coronal 2D reformatted images were created and submitted for interpretation  Radiation dose length product (DLP) for this visit:  966 93 mGy-cm   This examination, like all CT scans performed in the [facility], was performed utilizing techniques to minimize radiation dose exposure, including the use of iterative  reconstruction and automated exposure control  IMAGE QUALITY:  Image quality is limited through the posterior fossa  FINDINGS: PARENCHYMA:  There is no visible parenchymal mass or hemorrhage or midline shift  In the right frontal lobe, inferiorly, on image 27 series 2 there seems to be an area of diminished distinction between gray matter and white matter which might signify an  area of subacute ischemia, however, in this location it might simply be artifact  Suggest short interval follow-up in about 12 hours for reassessment  Overall, the finding is favored to be an artifact  An MRI, if possible, would presumably be much more definitive  VENTRICLES AND EXTRA-AXIAL SPACES:  Normal for the patient's age  VISUALIZED ORBITS AND PARANASAL SINUSES:  There is prominence of the intraorbital vessels bilaterally  These may simply be varicosities  There does not appear to be any evidence of acute pathology in the cavernous sinus  Sinuses are clear  CALVARIUM AND EXTRACRANIAL SOFT TISSUES:  No acute findings  Small amount of right mastoid fluid noted incidentally  Impression: Area of low density/diminished gray-white distinction in the inferior right frontal lobe which is probably artifactual, however, 12 hour follow-up reassessment suggested as this could be subacute ischemia  No hemorrhage  Dilated intraorbital vessels bilaterally perhaps venous varicosities  No gross evidence of pathology of the cavernous sinus   Workstation performed: [id number]       EKG reviewed personally: unable to review EKG from ED  Telemetry reviewed personally: atrial fibrillation, rate controlled    Assessment:  Principal Problem:    NSTEMI (non-ST elevated myocardial infarction) ([facility] [address] )  Active Problems:    Essential hypertension    Sepsis ([facility] [address] )    Acute encephalopathy    PATRICK (acute kidney injury) ([facility] [address] )    Respiratory distress        Code Status: Level 1 - Full Code

## 2019-01-25 ENCOUNTER — APPOINTMENT (INPATIENT)
Dept: NON INVASIVE DIAGNOSTICS | Facility: HOSPITAL | Age: 59
DRG: 871 | End: 2019-01-25
Payer: COMMERCIAL

## 2019-01-25 PROBLEM — R78.81 GRAM-POSITIVE BACTEREMIA: Status: ACTIVE | Noted: 2019-01-25

## 2019-01-25 PROBLEM — R74.01 TRANSAMINITIS: Status: ACTIVE | Noted: 2019-01-25

## 2019-01-25 PROBLEM — N17.9 ACUTE RENAL FAILURE (ARF) (HCC): Status: ACTIVE | Noted: 2019-01-25

## 2019-01-25 LAB
ADENOVIRUS: NOT DETECTED
ALBUMIN SERPL BCP-MCNC: 2.5 G/DL (ref 3.5–5)
ALP SERPL-CCNC: 80 U/L (ref 46–116)
ALT SERPL W P-5'-P-CCNC: 323 U/L (ref 12–78)
ANION GAP SERPL CALCULATED.3IONS-SCNC: 12 MMOL/L (ref 4–13)
ANION GAP SERPL CALCULATED.3IONS-SCNC: 13 MMOL/L (ref 4–13)
ANION GAP SERPL CALCULATED.3IONS-SCNC: 9 MMOL/L (ref 4–13)
AST SERPL W P-5'-P-CCNC: 1114 U/L (ref 5–45)
ATRIAL RATE: 113 BPM
BACTERIA BLD CULT: ABNORMAL
BACTERIA BLD CULT: ABNORMAL
BASE EX.OXY STD BLDV CALC-SCNC: 74.6 % (ref 60–80)
BASE EXCESS BLDA CALC-SCNC: -2.7 MMOL/L
BASE EXCESS BLDV CALC-SCNC: -4.4 MMOL/L
BASOPHILS # BLD AUTO: 0.03 THOUSANDS/ΜL (ref 0–0.1)
BASOPHILS NFR BLD AUTO: 0 % (ref 0–1)
BILIRUB DIRECT SERPL-MCNC: 0.39 MG/DL (ref 0–0.2)
BILIRUB SERPL-MCNC: 0.71 MG/DL (ref 0.2–1)
BODY TEMPERATURE: 98 DEGREES FEHRENHEIT
BODY TEMPERATURE: 99.5 DEGREES FEHRENHEIT
BUN SERPL-MCNC: 39 MG/DL (ref 5–25)
BUN SERPL-MCNC: 40 MG/DL (ref 5–25)
BUN SERPL-MCNC: 45 MG/DL (ref 5–25)
C PNEUM DNA SPEC QL NAA+PROBE: NOT DETECTED
CA-I BLD-SCNC: 1.01 MMOL/L (ref 1.12–1.32)
CA-I BLD-SCNC: 1.1 MMOL/L (ref 1.12–1.32)
CALCIUM SERPL-MCNC: 7.5 MG/DL (ref 8.3–10.1)
CALCIUM SERPL-MCNC: 7.9 MG/DL (ref 8.3–10.1)
CALCIUM SERPL-MCNC: 8.2 MG/DL (ref 8.3–10.1)
CHLORIDE SERPL-SCNC: 104 MMOL/L (ref 100–108)
CHLORIDE SERPL-SCNC: 105 MMOL/L (ref 100–108)
CHLORIDE SERPL-SCNC: 106 MMOL/L (ref 100–108)
CK MB SERPL-MCNC: 139.8 NG/ML (ref 0–5)
CK MB SERPL-MCNC: <1 % (ref 0–2.5)
CK SERPL-CCNC: ABNORMAL U/L (ref 39–308)
CO2 SERPL-SCNC: 20 MMOL/L (ref 21–32)
CO2 SERPL-SCNC: 20 MMOL/L (ref 21–32)
CO2 SERPL-SCNC: 21 MMOL/L (ref 21–32)
CREAT SERPL-MCNC: 3.14 MG/DL (ref 0.6–1.3)
CREAT SERPL-MCNC: 3.55 MG/DL (ref 0.6–1.3)
CREAT SERPL-MCNC: 3.64 MG/DL (ref 0.6–1.3)
EBV EA IGG SER-ACNC: <9 U/ML (ref 0–8.9)
EBV NA IGG SER IA-ACNC: <18 U/ML (ref 0–17.9)
EBV PATRN SPEC IB-IMP: ABNORMAL
EBV VCA IGG SER IA-ACNC: 187 U/ML (ref 0–17.9)
EBV VCA IGM SER IA-ACNC: <36 U/ML (ref 0–35.9)
EOSINOPHIL # BLD AUTO: 0 THOUSAND/ΜL (ref 0–0.61)
EOSINOPHIL NFR BLD AUTO: 0 % (ref 0–6)
ERYTHROCYTE [DISTWIDTH] IN BLOOD BY AUTOMATED COUNT: 16.4 % (ref 11.6–15.1)
FLUAV H1 RNA SPEC QL NAA+PROBE: NOT DETECTED
FLUAV H3 RNA SPEC QL NAA+PROBE: NOT DETECTED
FLUAV RNA SPEC QL NAA+PROBE: NOT DETECTED
FLUBV RNA SPEC QL NAA+PROBE: NOT DETECTED
GFR SERPL CREATININE-BSD FRML MDRD: 17 ML/MIN/1.73SQ M
GFR SERPL CREATININE-BSD FRML MDRD: 18 ML/MIN/1.73SQ M
GFR SERPL CREATININE-BSD FRML MDRD: 21 ML/MIN/1.73SQ M
GLUCOSE SERPL-MCNC: 155 MG/DL (ref 65–140)
GLUCOSE SERPL-MCNC: 155 MG/DL (ref 65–140)
GLUCOSE SERPL-MCNC: 173 MG/DL (ref 65–140)
GLUCOSE SERPL-MCNC: 188 MG/DL (ref 65–140)
GLUCOSE SERPL-MCNC: 197 MG/DL (ref 65–140)
GLUCOSE SERPL-MCNC: 205 MG/DL (ref 65–140)
GRAM STN SPEC: ABNORMAL
GRAM STN SPEC: ABNORMAL
HAV IGM SER QL: NORMAL
HBOV DNA SPEC QL NAA+PROBE: NOT DETECTED
HBV CORE IGM SER QL: NORMAL
HBV SURFACE AG SER QL: NORMAL
HCO3 BLDA-SCNC: 21.3 MMOL/L (ref 22–28)
HCO3 BLDV-SCNC: 19.5 MMOL/L (ref 24–30)
HCOV 229E RNA SPEC QL NAA+PROBE: NOT DETECTED
HCOV HKU1 RNA SPEC QL NAA+PROBE: NOT DETECTED
HCOV NL63 RNA SPEC QL NAA+PROBE: NOT DETECTED
HCOV OC43 RNA SPEC QL NAA+PROBE: NOT DETECTED
HCT VFR BLD AUTO: 42.5 % (ref 36.5–49.3)
HCV AB SER QL: NORMAL
HGB BLD-MCNC: 13.5 G/DL (ref 12–17)
HOROWITZ INDEX BLDA+IHG-RTO: 40 MM[HG]
HOROWITZ INDEX BLDA+IHG-RTO: 40 MM[HG]
HPIV1 RNA SPEC QL NAA+PROBE: NOT DETECTED
HPIV2 RNA SPEC QL NAA+PROBE: NOT DETECTED
HPIV3 RNA SPEC QL NAA+PROBE: NOT DETECTED
HPIV4 RNA SPEC QL NAA+PROBE: NOT DETECTED
IMM GRANULOCYTES # BLD AUTO: 0.2 THOUSAND/UL (ref 0–0.2)
IMM GRANULOCYTES NFR BLD AUTO: 2 % (ref 0–2)
INR PPP: 0.94 (ref 0.86–1.17)
LYMPHOCYTES # BLD AUTO: 0.35 THOUSANDS/ΜL (ref 0.6–4.47)
LYMPHOCYTES NFR BLD AUTO: 3 % (ref 14–44)
M PNEUMO DNA SPEC QL NAA+PROBE: NOT DETECTED
MAGNESIUM SERPL-MCNC: 2 MG/DL (ref 1.6–2.6)
MAGNESIUM SERPL-MCNC: 2.1 MG/DL (ref 1.6–2.6)
MAGNESIUM SERPL-MCNC: 2.1 MG/DL (ref 1.6–2.6)
MCH RBC QN AUTO: 26.3 PG (ref 26.8–34.3)
MCHC RBC AUTO-ENTMCNC: 31.8 G/DL (ref 31.4–37.4)
MCV RBC AUTO: 83 FL (ref 82–98)
METAPNEUMOVIRUS: NOT DETECTED
MONOCYTES # BLD AUTO: 0.95 THOUSAND/ΜL (ref 0.17–1.22)
MONOCYTES NFR BLD AUTO: 9 % (ref 4–12)
NEUTROPHILS # BLD AUTO: 9.59 THOUSANDS/ΜL (ref 1.85–7.62)
NEUTS SEG NFR BLD AUTO: 86 % (ref 43–75)
NRBC BLD AUTO-RTO: 0 /100 WBCS
O2 CT BLDA-SCNC: 18.5 ML/DL (ref 16–23)
O2 CT BLDV-SCNC: 14.7 ML/DL
OXYHGB MFR BLDA: 93.3 % (ref 94–97)
P AXIS: 49 DEGREES
PCO2 BLDA: 34.8 MM HG (ref 36–44)
PCO2 BLDV: 32.8 MM HG (ref 42–50)
PEEP RESPIRATORY: 5 CM[H2O]
PEEP RESPIRATORY: 5 CM[H2O]
PH BLDA: 7.41 [PH] (ref 7.35–7.45)
PH BLDV: 7.39 [PH] (ref 7.3–7.4)
PHOSPHATE SERPL-MCNC: 2.3 MG/DL (ref 2.7–4.5)
PHOSPHATE SERPL-MCNC: 2.9 MG/DL (ref 2.7–4.5)
PHOSPHATE SERPL-MCNC: 3.1 MG/DL (ref 2.7–4.5)
PLATELET # BLD AUTO: 57 THOUSANDS/UL (ref 149–390)
PO2 BLDA: 70 MM HG (ref 75–129)
PO2 BLDV: 40.4 MM HG (ref 35–45)
POTASSIUM SERPL-SCNC: 3.7 MMOL/L (ref 3.5–5.3)
POTASSIUM SERPL-SCNC: 3.9 MMOL/L (ref 3.5–5.3)
POTASSIUM SERPL-SCNC: 4.7 MMOL/L (ref 3.5–5.3)
PR INTERVAL: 170 MS
PROCALCITONIN SERPL-MCNC: 255.4 NG/ML
PROT SERPL-MCNC: 6.1 G/DL (ref 6.4–8.2)
PROTHROMBIN TIME: 12.7 SECONDS (ref 11.8–14.2)
QRS AXIS: 50 DEGREES
QRSD INTERVAL: 156 MS
QT INTERVAL: 350 MS
QTC INTERVAL: 480 MS
RBC # BLD AUTO: 5.14 MILLION/UL (ref 3.88–5.62)
RHINOVIRUS RNA SPEC QL NAA+PROBE: NOT DETECTED
RSV A RNA SPEC QL NAA+PROBE: NOT DETECTED
RSV B RNA SPEC QL NAA+PROBE: NOT DETECTED
SODIUM SERPL-SCNC: 136 MMOL/L (ref 136–145)
SODIUM SERPL-SCNC: 136 MMOL/L (ref 136–145)
SODIUM SERPL-SCNC: 138 MMOL/L (ref 136–145)
SPECIMEN SOURCE: ABNORMAL
T WAVE AXIS: 88 DEGREES
VENT AC: 20
VENT AC: 20
VENT- AC: AC
VENT- AC: AC
VENTRICULAR RATE: 113 BPM
VT SETTING VENT: 500 ML
VT SETTING VENT: 500 ML
WBC # BLD AUTO: 11.12 THOUSAND/UL (ref 4.31–10.16)

## 2019-01-25 PROCEDURE — 84100 ASSAY OF PHOSPHORUS: CPT | Performed by: INTERNAL MEDICINE

## 2019-01-25 PROCEDURE — 82553 CREATINE MB FRACTION: CPT | Performed by: PHYSICIAN ASSISTANT

## 2019-01-25 PROCEDURE — 99254 IP/OBS CNSLTJ NEW/EST MOD 60: CPT | Performed by: INTERNAL MEDICINE

## 2019-01-25 PROCEDURE — 90945 DIALYSIS ONE EVALUATION: CPT

## 2019-01-25 PROCEDURE — 82948 REAGENT STRIP/BLOOD GLUCOSE: CPT

## 2019-01-25 PROCEDURE — 80076 HEPATIC FUNCTION PANEL: CPT | Performed by: ANESTHESIOLOGY

## 2019-01-25 PROCEDURE — 84145 PROCALCITONIN (PCT): CPT | Performed by: PHYSICIAN ASSISTANT

## 2019-01-25 PROCEDURE — 93010 ELECTROCARDIOGRAM REPORT: CPT | Performed by: INTERNAL MEDICINE

## 2019-01-25 PROCEDURE — 83735 ASSAY OF MAGNESIUM: CPT | Performed by: INTERNAL MEDICINE

## 2019-01-25 PROCEDURE — 85025 COMPLETE CBC W/AUTO DIFF WBC: CPT | Performed by: ANESTHESIOLOGY

## 2019-01-25 PROCEDURE — 90945 DIALYSIS ONE EVALUATION: CPT | Performed by: INTERNAL MEDICINE

## 2019-01-25 PROCEDURE — 93312 ECHO TRANSESOPHAGEAL: CPT

## 2019-01-25 PROCEDURE — 93320 DOPPLER ECHO COMPLETE: CPT | Performed by: INTERNAL MEDICINE

## 2019-01-25 PROCEDURE — 85610 PROTHROMBIN TIME: CPT | Performed by: ANESTHESIOLOGY

## 2019-01-25 PROCEDURE — 82330 ASSAY OF CALCIUM: CPT | Performed by: INTERNAL MEDICINE

## 2019-01-25 PROCEDURE — 99291 CRITICAL CARE FIRST HOUR: CPT | Performed by: INTERNAL MEDICINE

## 2019-01-25 PROCEDURE — 93312 ECHO TRANSESOPHAGEAL: CPT | Performed by: INTERNAL MEDICINE

## 2019-01-25 PROCEDURE — 82805 BLOOD GASES W/O2 SATURATION: CPT | Performed by: ANESTHESIOLOGY

## 2019-01-25 PROCEDURE — 80048 BASIC METABOLIC PNL TOTAL CA: CPT | Performed by: INTERNAL MEDICINE

## 2019-01-25 PROCEDURE — 93325 DOPPLER ECHO COLOR FLOW MAPG: CPT | Performed by: INTERNAL MEDICINE

## 2019-01-25 PROCEDURE — 82805 BLOOD GASES W/O2 SATURATION: CPT | Performed by: PHYSICIAN ASSISTANT

## 2019-01-25 PROCEDURE — 99292 CRITICAL CARE ADDL 30 MIN: CPT | Performed by: INTERNAL MEDICINE

## 2019-01-25 PROCEDURE — 94003 VENT MGMT INPAT SUBQ DAY: CPT

## 2019-01-25 PROCEDURE — 82550 ASSAY OF CK (CPK): CPT | Performed by: PHYSICIAN ASSISTANT

## 2019-01-25 PROCEDURE — 99233 SBSQ HOSP IP/OBS HIGH 50: CPT | Performed by: INTERNAL MEDICINE

## 2019-01-25 PROCEDURE — 94760 N-INVAS EAR/PLS OXIMETRY 1: CPT

## 2019-01-25 PROCEDURE — 36620 INSERTION CATHETER ARTERY: CPT | Performed by: INTERNAL MEDICINE

## 2019-01-25 RX ORDER — FAMOTIDINE 40 MG/5ML
20 POWDER, FOR SUSPENSION ORAL 2 TIMES DAILY
Status: DISCONTINUED | OUTPATIENT
Start: 2019-01-25 | End: 2019-01-25

## 2019-01-25 RX ORDER — PROPOFOL 10 MG/ML
5-50 INJECTION, EMULSION INTRAVENOUS
Status: DISCONTINUED | OUTPATIENT
Start: 2019-01-25 | End: 2019-01-25

## 2019-01-25 RX ORDER — POTASSIUM CHLORIDE 29.8 MG/ML
40 INJECTION INTRAVENOUS ONCE
Status: COMPLETED | OUTPATIENT
Start: 2019-01-25 | End: 2019-01-25

## 2019-01-25 RX ORDER — SENNOSIDES 8.8 MG/5ML
8.8 LIQUID ORAL
Status: DISCONTINUED | OUTPATIENT
Start: 2019-01-25 | End: 2019-02-03 | Stop reason: ALTCHOICE

## 2019-01-25 RX ORDER — CEFAZOLIN SODIUM 2 G/50ML
2000 SOLUTION INTRAVENOUS EVERY 12 HOURS
Status: DISCONTINUED | OUTPATIENT
Start: 2019-01-25 | End: 2019-02-01

## 2019-01-25 RX ORDER — BISACODYL 10 MG
10 SUPPOSITORY, RECTAL RECTAL DAILY PRN
Status: DISCONTINUED | OUTPATIENT
Start: 2019-01-25 | End: 2019-03-16 | Stop reason: HOSPADM

## 2019-01-25 RX ORDER — FAMOTIDINE 40 MG/5ML
20 POWDER, FOR SUSPENSION ORAL DAILY
Status: DISCONTINUED | OUTPATIENT
Start: 2019-01-25 | End: 2019-01-27

## 2019-01-25 RX ADMIN — HYDROCORTISONE SODIUM SUCCINATE 50 MG: 100 INJECTION, POWDER, FOR SOLUTION INTRAMUSCULAR; INTRAVENOUS at 12:35

## 2019-01-25 RX ADMIN — Medication 20000 ML: at 05:12

## 2019-01-25 RX ADMIN — NOREPINEPHRINE BITARTRATE 17 MCG/MIN: 1 INJECTION INTRAVENOUS at 10:48

## 2019-01-25 RX ADMIN — DEXMEDETOMIDINE 0.6 MCG/KG/HR: 100 INJECTION, SOLUTION, CONCENTRATE INTRAVENOUS at 11:11

## 2019-01-25 RX ADMIN — NYSTATIN: 100000 POWDER TOPICAL at 08:35

## 2019-01-25 RX ADMIN — CHLORHEXIDINE GLUCONATE 0.12% ORAL RINSE 15 ML: 1.2 LIQUID ORAL at 08:35

## 2019-01-25 RX ADMIN — MILRINONE LACTATE IN DEXTROSE 0.25 MCG/KG/MIN: 200 INJECTION, SOLUTION INTRAVENOUS at 10:02

## 2019-01-25 RX ADMIN — INSULIN LISPRO 5 UNITS: 100 INJECTION, SOLUTION INTRAVENOUS; SUBCUTANEOUS at 18:11

## 2019-01-25 RX ADMIN — VASOPRESSIN 0.04 UNITS/MIN: 20 INJECTION INTRAVENOUS at 20:03

## 2019-01-25 RX ADMIN — DEXMEDETOMIDINE 0.6 MCG/KG/HR: 100 INJECTION, SOLUTION, CONCENTRATE INTRAVENOUS at 06:21

## 2019-01-25 RX ADMIN — PROPOFOL 5 MCG/KG/MIN: 10 INJECTION, EMULSION INTRAVENOUS at 08:45

## 2019-01-25 RX ADMIN — DEXMEDETOMIDINE 0.6 MCG/KG/HR: 100 INJECTION, SOLUTION, CONCENTRATE INTRAVENOUS at 03:45

## 2019-01-25 RX ADMIN — DEXMEDETOMIDINE 0.6 MCG/KG/HR: 100 INJECTION, SOLUTION, CONCENTRATE INTRAVENOUS at 20:03

## 2019-01-25 RX ADMIN — DEXMEDETOMIDINE 0.6 MCG/KG/HR: 100 INJECTION, SOLUTION, CONCENTRATE INTRAVENOUS at 20:52

## 2019-01-25 RX ADMIN — MILRINONE LACTATE IN DEXTROSE 0.25 MCG/KG/MIN: 200 INJECTION, SOLUTION INTRAVENOUS at 20:04

## 2019-01-25 RX ADMIN — DEXMEDETOMIDINE 0.6 MCG/KG/HR: 100 INJECTION, SOLUTION, CONCENTRATE INTRAVENOUS at 19:02

## 2019-01-25 RX ADMIN — DEXMEDETOMIDINE 0.6 MCG/KG/HR: 100 INJECTION, SOLUTION, CONCENTRATE INTRAVENOUS at 09:12

## 2019-01-25 RX ADMIN — NOREPINEPHRINE BITARTRATE 17 MCG/MIN: 1 INJECTION INTRAVENOUS at 05:50

## 2019-01-25 RX ADMIN — FENTANYL CITRATE 50 MCG: 50 INJECTION, SOLUTION INTRAMUSCULAR; INTRAVENOUS at 20:16

## 2019-01-25 RX ADMIN — CEFAZOLIN SODIUM 2000 MG: 2 SOLUTION INTRAVENOUS at 00:00

## 2019-01-25 RX ADMIN — Medication 20000 ML: at 21:28

## 2019-01-25 RX ADMIN — DEXMEDETOMIDINE 0.6 MCG/KG/HR: 100 INJECTION, SOLUTION, CONCENTRATE INTRAVENOUS at 16:20

## 2019-01-25 RX ADMIN — NOREPINEPHRINE BITARTRATE 19 MCG/MIN: 1 INJECTION INTRAVENOUS at 02:09

## 2019-01-25 RX ADMIN — VASOPRESSIN 0.04 UNITS/MIN: 20 INJECTION INTRAVENOUS at 10:04

## 2019-01-25 RX ADMIN — SENNOSIDES A AND B 8.8 MG: 415.36 LIQUID ORAL at 23:00

## 2019-01-25 RX ADMIN — NOREPINEPHRINE BITARTRATE 14.13 MCG/MIN: 1 INJECTION INTRAVENOUS at 15:00

## 2019-01-25 RX ADMIN — HYDROCORTISONE SODIUM SUCCINATE 50 MG: 100 INJECTION, POWDER, FOR SOLUTION INTRAMUSCULAR; INTRAVENOUS at 00:30

## 2019-01-25 RX ADMIN — Medication 20000 ML: at 15:12

## 2019-01-25 RX ADMIN — INSULIN LISPRO 5 UNITS: 100 INJECTION, SOLUTION INTRAVENOUS; SUBCUTANEOUS at 08:53

## 2019-01-25 RX ADMIN — CHLORHEXIDINE GLUCONATE 0.12% ORAL RINSE 15 ML: 1.2 LIQUID ORAL at 20:01

## 2019-01-25 RX ADMIN — NYSTATIN: 100000 POWDER TOPICAL at 17:39

## 2019-01-25 RX ADMIN — ALTEPLASE 2 MG: 2.2 INJECTION, POWDER, LYOPHILIZED, FOR SOLUTION INTRAVENOUS at 10:40

## 2019-01-25 RX ADMIN — MILRINONE LACTATE IN DEXTROSE 0.25 MCG/KG/MIN: 200 INJECTION, SOLUTION INTRAVENOUS at 00:25

## 2019-01-25 RX ADMIN — METRONIDAZOLE 500 MG: 500 INJECTION, SOLUTION INTRAVENOUS at 07:56

## 2019-01-25 RX ADMIN — CALCIUM GLUCONATE 2 G: 98 INJECTION, SOLUTION INTRAVENOUS at 14:22

## 2019-01-25 RX ADMIN — VASOPRESSIN 0.04 UNITS/MIN: 20 INJECTION INTRAVENOUS at 00:10

## 2019-01-25 RX ADMIN — DEXMEDETOMIDINE 0.6 MCG/KG/HR: 100 INJECTION, SOLUTION, CONCENTRATE INTRAVENOUS at 01:23

## 2019-01-25 RX ADMIN — NOREPINEPHRINE BITARTRATE 12 MCG/MIN: 1 INJECTION INTRAVENOUS at 20:49

## 2019-01-25 RX ADMIN — FAMOTIDINE 20 MG: 40 POWDER, FOR SUSPENSION ORAL at 11:12

## 2019-01-25 RX ADMIN — HYDROCORTISONE SODIUM SUCCINATE 50 MG: 100 INJECTION, POWDER, FOR SOLUTION INTRAMUSCULAR; INTRAVENOUS at 17:31

## 2019-01-25 RX ADMIN — CALCIUM GLUCONATE 1 G: 98 INJECTION, SOLUTION INTRAVENOUS at 20:01

## 2019-01-25 RX ADMIN — INSULIN LISPRO 5 UNITS: 100 INJECTION, SOLUTION INTRAVENOUS; SUBCUTANEOUS at 12:32

## 2019-01-25 RX ADMIN — POTASSIUM CHLORIDE 40 MEQ: 400 INJECTION, SOLUTION INTRAVENOUS at 04:55

## 2019-01-25 RX ADMIN — SODIUM PHOSPHATE, MONOBASIC, MONOHYDRATE 6 MMOL: 276; 142 INJECTION, SOLUTION INTRAVENOUS at 20:01

## 2019-01-25 RX ADMIN — INSULIN LISPRO 5 UNITS: 100 INJECTION, SOLUTION INTRAVENOUS; SUBCUTANEOUS at 23:59

## 2019-01-25 RX ADMIN — CEFAZOLIN SODIUM 2000 MG: 2 SOLUTION INTRAVENOUS at 21:08

## 2019-01-25 RX ADMIN — CEFAZOLIN SODIUM 2000 MG: 2 SOLUTION INTRAVENOUS at 08:35

## 2019-01-25 RX ADMIN — CALCIUM GLUCONATE 2 G: 98 INJECTION, SOLUTION INTRAVENOUS at 05:05

## 2019-01-25 RX ADMIN — POTASSIUM CHLORIDE 40 MEQ: 400 INJECTION, SOLUTION INTRAVENOUS at 14:11

## 2019-01-25 RX ADMIN — HYDROCORTISONE SODIUM SUCCINATE 50 MG: 100 INJECTION, POWDER, FOR SOLUTION INTRAMUSCULAR; INTRAVENOUS at 06:29

## 2019-01-25 RX ADMIN — FENTANYL CITRATE 50 MCG: 50 INJECTION, SOLUTION INTRAMUSCULAR; INTRAVENOUS at 03:20

## 2019-01-25 RX ADMIN — HYDROCORTISONE SODIUM SUCCINATE 50 MG: 100 INJECTION, POWDER, FOR SOLUTION INTRAMUSCULAR; INTRAVENOUS at 23:20

## 2019-01-25 RX ADMIN — DEXMEDETOMIDINE 0.6 MCG/KG/HR: 100 INJECTION, SOLUTION, CONCENTRATE INTRAVENOUS at 13:45

## 2019-01-25 NOTE — RESPIRATORY THERAPY NOTE
RT Ventilator Management Note  Jenifer Mon 60 y o  male MRN: [id number]  Unit/Bed#: [facility] 11 Encounter: [id number]      Daily Screen       1/23/2019 2002 1/24/2019 3345          Patient safety screen outcome[de-identified] Failed Failed      Not Ready for Weaning due to[de-identified] Hemodynamically unstable;Recieving paralytics Hemodynamically unstable; Underline problem not resolved;Poor inspiratory effort              Physical Exam:   Assessment Type: Assess only  General Appearance: Eyes open/responds to stimulus  Respiratory Pattern: Assisted, Normal  Chest Assessment: Chest expansion symmetrical  Bilateral Breath Sounds: Diminished, Clear  R Breath Sounds: Diminished, Clear  L Breath Sounds: Diminished, Clear  Cough: None  Suction: ET Tube  O2 Device: ventilator      Resp Comments: Pt tolerating current vent settings and appears to be resting comfortably  No vent changes at this time  Will continue to monitor and assess per respiratory protocol

## 2019-01-25 NOTE — PHYSICAL THERAPY NOTE
Physical Therapy Cancellation Note    PT CONSULT RECEIVED  PATIENT IS INTUBATED/SEDATED AT THIS TIME AND UNABLE TO PARTICIPATE IN SKILLED MOBILITY  PT WILL CONTINUE TO FOLLOW ON CASELOAD AND PERFORM INITIAL EVALUATION AS MEDICALLY APPROPRIATE      Lesley Hogan, PT

## 2019-01-25 NOTE — PROGRESS NOTES
Cardiology Progress Note - Sander Langford 60 y o  male MRN: [id number]    Unit/Bed#: [facility] 11 Encounter: [id number]      Assessment:  Principal Problem:    Cardiogenic shock ([facility] )  Active Problems:    Acute encephalopathy    NSTEMI (non-ST elevated myocardial infarction) ([facility] )    PATRICK (acute kidney injury) ([facility] 75 )    Stress-induced cardiomyopathy    Acute respiratory failure with hypoxia ([facility] 75 )    History of aortic valve replacement with bioprosthetic valve    Septic shock (HCC)    Oliguria    Gram-positive bacteremia    Acute renal failure (ARF) ([facility])    Transaminitis      Plan:  1  HFrEF- LVEF-30%, LVIDd-6 5 cm- Cardiomyopathy in the setting of Septic shock with possible component of cardiogenic shock- Biventricular failure  - Clinically does not appear to be hypervolemic and is warm  - On Milrinone 0 25 mcg/kg/min to aid with cardiac contractility  SVO2- on VBG this AM-74 6  - on levophed and vasopressin for sepsis  - Echo 1/25- Upper normal LV size with LVEF-30%, Concentric hypertrophy, Dilated RV with reduced function, Dyssynergic septum  Bioprosthetic aortic valve- not very well visualized- mean gradient of 13  - His cardiomyopathy is predominantly sepsis mediated myocardial dysfunction     2  Septic shock- with gram positive bacteremia  - On broad spectrum antibiotics- Cefazolin, Flagyl and Vancomycin and stress dose steroids  - CT chest 1/23- basilar consolidative lung changes not impressive to explain the degree of his septic shock and he is not high requirements on the vent itself  - He does have coin shaped excoriations on the upper back- wife reports scratching his upper back- possible seeding from this to the aortic valve  No recent dental work or respiratory workup or recent travel  - Plan for CHON at 10 AM  - On Vasopressin and Levophed to maintain MAP>65     3   Bioprosthetic AVR for severe degenerative AS- 25 mm [facility] in July 2014  - Gradients across the valve in the past have been around 21, current gradients are 13- CHON to evaluate for endocarditis     4  Troponin elevation  - Troponins elevated to above 40, this is in the setting of septic shock  - EKG with no ischemic changes  - Normal cath in 2014  - This is not a NSTEMI- discontinue IV heparin  Platelet count is also low- with ongoing Sepsis    5  New onset Atrial fibrillation in the setting of sepsis  - Hold anticoagulation for now as well given dropping platelets     6  PATRICK- in the setting of septic shock  - on CRRT     7  Transaminitis          Subjective:   Patient seen and examined  Initiated on CRRT and Milrinone to aid with cardiac contractility  Pressor requirements have been improving    Telemetry- Atrial fibrillation with ventricular rates of 100-125    Objective:     Vitals: Blood pressure 124/72, pulse (!) 114, temperature 97 9 °F (36 6 °C), resp  rate (!) 23, height 5' 9" (1 753 m), weight (!) 145 kg (319 lb 3 6 oz), SpO2 96 %  , Body mass index is 47 14 kg/m² , Orthostatic Blood Pressures      Most Recent Value   Blood Pressure  124/72 filed at 01/24/2019 1530   Patient Position - Orthostatic VS  Lying filed at 01/24/2019 1530            Intake/Output Summary (Last 24 hours) at 01/25/19 0856  Last data filed at 01/25/19 0800   Gross per 24 hour   Intake          4238 39 ml   Output             2036 ml   Net          2202 39 ml         Physical Exam:    GEN: Abel Almeida intubated and sedated  HEENT: anicteric, mucous membranes moist  NECK: no jvd, no carotid bruits, right and left central lines  HEART: Irregular rhythm, normal S1 and S2, no murmurs, clicks, gallops or rubs   LUNGS: clear to auscultation bilaterally in the anterior lung fields   ABDOMEN: normal bowel sounds, soft, no tenderness, no distention  EXTREMITIES: peripheral pulses hard to feel; bluish discoloration of the toes, cold feet  NEURO: no focal findings   SKIN: coin shaped excoriations of the upper back      Current Facility-Administered Medications:    acetaminophen (TYLENOL) oral suspension 650 mg, 650 mg, Oral, Q6H PRN, Amberly Teresa MD, 650 mg at 01/24/19 1946    albuterol inhalation solution 2 5 mg, 2 5 mg, Nebulization, Q4H PRN, Wilhemenia Spatz, PA-C    bisacodyl (DULCOLAX) rectal suppository 10 mg, 10 mg, Rectal, Daily PRN, Wilhemenia Spatz, PA-C    ceFAZolin (ANCEF) IVPB (premix) 2,000 mg, 2,000 mg, Intravenous, Q12H, Donny Mejia PA-C    chlorhexidine (PERIDEX) 0 12 % oral rinse 15 mL, 15 mL, [employer] [place], Q12H [facility], Wilhemenia Spatz, PA-C, 15 mL at 01/25/19 2226    dexmedetomidine (PRECEDEX) 200 mcg in sodium chloride 0 9 % 50 mL infusion, 0 1-0 7 mcg/kg/hr, Intravenous, Titrated, Wilhemenia Spatz, PA-C, Last Rate: 23 1 mL/hr at 01/25/19 0621, 0 6 mcg/kg/hr at 01/25/19 0621    famotidine (PEPCID) oral suspension 20 mg, 20 mg, Oral, Daily, Donny Mejia PA-C    fentanyl citrate (PF) 100 MCG/2ML 50 mcg, 50 mcg, Intravenous, Q1H PRN, Amberly Teresa MD, 50 mcg at 01/25/19 0320    hydrocortisone sodium succinate (PF) (Solu-CORTEF) injection 50 mg, 50 mg, Intravenous, Q6H [facility], Margarita Kelly DO, 50 mg at 01/25/19 0629    insulin lispro (HumaLOG) 100 units/mL subcutaneous injection 5-25 Units, 5-25 Units, Subcutaneous, Q6H [facility], 5 Units at 01/25/19 0853 **AND** Fingerstick Glucose (POCT), , , Q6H, Wilhemenia Spatz, PA-C    metroNIDAZOLE (FLAGYL) IVPB (premix) 500 mg, 500 mg, Intravenous, Q8H, Wilhemenia Spatz, PA-C, Last Rate: 200 mL/hr at 01/25/19 0756, 500 mg at 01/25/19 0756    Milrinone Lactate in Dextrose (PRIMACOR) 20 MG/100 ML infusion (premix), 0 25 mcg/kg/min, Intravenous, Continuous, Wilhemenia Spatz, PA-C, Last Rate: 10 9 mL/hr at 01/25/19 0025, 0 25 mcg/kg/min at 01/25/19 0025    norepinephrine (LEVOPHED) 4 mg (STANDARD CONCENTRATION) IV in sodium chloride 0 9% 250 mL, 1-30 mcg/min, Intravenous, Titrated, Wilhemenia Spatz, PA-C, Last Rate: 56 3 mL/hr at 01/25/19 0635, 15 mcg/min at 01/25/19 8355    NxStage K 4/Ca 3 dialysis solution (RFP-401) 20,000 mL, 20,000 mL, Dialysis, Continuous, Elisa El MD, Last Rate: 0 mL/hr at 01/25/19 0511, 20,000 mL at 01/25/19 7461    nystatin (MYCOSTATIN) powder, , Topical, BID, Alford , PA-C    propofol (DIPRIVAN) 1000 mg in 100 mL infusion (premix), 5-50 mcg/kg/min, Intravenous, Titrated, Niko Milner PA-C, Last Rate: 4 4 mL/hr at 01/25/19 0845, 5 mcg/kg/min at 01/25/19 0845    senna 8 8 mg/5 mL oral syrup 8 8 mg, 8 8 mg, Oral, HS, Alford , PA-C    vancomycin (VANCOCIN) 2,000 mg in sodium chloride 0 9 % 500 mL IVPB, 2,000 mg, Intravenous, Q24H, Alford , PA-C, Stopped at 01/24/19 2005    vasopressin (PITRESSIN) 20 Units in sodium chloride 0 9 % 100 mL infusion, 0 04 Units/min, Intravenous, Continuous, Alford , PA-C, Last Rate: 12 mL/hr at 01/25/19 0010, 0 04 Units/min at 01/25/19 0010    Labs & Results:    Lab Results   Component Value Date    CKTOTAL 49,175 (H) 01/24/2019    CKTOTAL 60,717 (H) 01/24/2019    CKMB 273 4 (H) 01/24/2019    CKMB 341 2 (H) 01/24/2019    CKMBINDEX <1 0 01/24/2019    CKMBINDEX <1 0 01/24/2019    TROPONINI 36 20 (H) 01/24/2019    TROPONINI 36 30 (H) 01/24/2019    TROPONINI 34 90 (H) 01/24/2019       Lab Results   Component Value Date    GLUCOSE 92 04/09/2015    CALCIUM 7 5 (L) 01/25/2019     04/09/2015    K 3 7 01/25/2019    CO2 20 (L) 01/25/2019     01/25/2019    BUN 45 (H) 01/25/2019    CREATININE 3 64 (H) 01/25/2019       Lab Results   Component Value Date    WBC 11 12 (H) 01/25/2019    HGB 13 5 01/25/2019    HCT 42 5 01/25/2019    MCV 83 01/25/2019    PLT 57 (L) 01/25/2019       Results from last 7 days  Lab Units 01/25/19  0410   INR  0 94       Lab Results   Component Value Date    CHOL 146 07/18/2014    CHOL 145 02/21/2014     Lab Results   Component Value Date    HDL 41 06/02/2018    HDL 52 06/27/2017     Lab Results   Component Value Date    LDLCALC 57 06/02/2018    LDLCALC 129 (H) 06/27/2017     Lab Results   Component Value Date TRIG 102 06/02/2018    TRIG 71 06/27/2017       Lab Results   Component Value Date     (H) 01/25/2019    AST 1,114 (H) 01/25/2019

## 2019-01-25 NOTE — PROCEDURES
Arterial Line Insertion  Date/Time: 1/24/2019 5:30 PM  Performed by: Jamarcus Jane by: Sheryle Kil     Patient location:  Bedside  Consent:     Consent obtained:  Emergent situation  Universal protocol:     Relevant documents present and verified: yes      Test results available and properly labeled: yes      Required blood products, implants, devices, and special equipment available: yes      Immediately prior to procedure a time out was called: yes      Patient identity confirmed:  Arm band  Indications:     Indications: hemodynamic monitoring and multiple ABGs    Pre-procedure details:     Skin preparation:  Chlorhexidine    Preparation: Patient was prepped and draped in sterile fashion    Anesthesia (see MAR for exact dosages): Anesthesia method:  None  Procedure details:     Location / Tip of Catheter:  Radial    Laterality:  Right    Jeison's test performed: no (cold, mottled, unable to perform)      Needle gauge:  20 G    Placement technique:  Hanna    Number of attempts:  2    Successful placement: yes      Transducer: waveform confirmed    Post-procedure details:     Post-procedure:  Sutured and sterile dressing applied    CMS:  Unchanged    Patient tolerance of procedure: Tolerated well, no immediate complications  Comments:      Initial attempt by Gia Stephen unsuccessful, placed successfully by me

## 2019-01-25 NOTE — UTILIZATION REVIEW
Initial Clinical Review    Admission: Date/Time/Statement: 1/24/19 @ 1442   Orders Placed This Encounter   Procedures    Inpatient Admission     Standing Status:   Standing     Number of Occurrences:   1     Order Specific Question:   Admitting Physician     Answer:   Hattie Kc [[id number]]     Order Specific Question:   Level of Care     Answer:   Critical Care [15]     Order Specific Question:   Bed Type     Answer:   Bariatric [1]     Order Specific Question:   Estimated length of stay     Answer:   More than 2 Midnights     Order Specific Question:   Certification     Answer:   I certify that inpatient services are medically necessary for this patient for a duration of greater than two midnights  See H&P and MD Progress Notes for additional information about the patient's course of treatment  [address] ICU  Chief Complaint: Rachid Shore is a 60 y o  male who called EMS after experiencing 2 days of worsening malaise and weakness, Wednesday afternoon the patient became extremely confused  EMS arrived and once evaluated realized the patient was in SVT, given adenosine which was successful  The patient was taken to [facility] ED where he was seen having shortness of breath, EKGs showed no ST changes just a known left bundle branch block   Patient was given 1 nitro sublingual  Pt developed severe respiratory distress and was intubated  Pt was pan cultured and empiric antibiotics were initiated along with fluid boluses  Labs at [facility] showed leukocytosis, troponin spill >24 x 2 and metabolic acidosis  Pt became hypotensive started on pressors and bicarb infusion  Bedside echo showed EF of 30%  H/o LBBB and an Aortic valve repair in 2014, recent Echo in 2018 showed EF of 50%  Patient was then transferred to [facility] for further evaluation    TEMPORARY HD CATHETER INSERTED        ED Vital Signs:   ED Triage Vitals   Temperature Pulse Respirations Blood Pressure SpO2   01/24/19 1530 01/24/19 1530 01/24/19 1530 01/24/19 1530 01/24/19 1500   99 °F (37 2 °C) 94 20 124/72 98 %      Temp Source Heart Rate Source Patient Position - Orthostatic VS BP Location FiO2 (%)   01/24/19 1530 -- 01/24/19 1530 01/24/19 1530 --   Rectal  Lying Left arm       Pain Score       01/24/19 1631       No Pain        Wt Readings from Last 1 Encounters:   01/24/19 (!) 145 kg (319 lb 3 6 oz)       ) Acute kidney injury  -most likely secondary to ischemic acute tubular necrosis in the setting of septic shock  -no history of chronic kidney disease  Baseline creatinine appears to be around 0 7-0 8 mg/dL  -currently oligo-anuric  -renal function continues to worsen  -currently on a sodium bicarbonate drip, there is some evidence of pulmonary congestion on chest x-ray  -patient was having a worsening metabolic acidosis but the pH was still acceptable at 7 3 done on blood work at 4:00 p m  Earlier today  -currently being treated for septic shock, resulting in a degree of cardiomyopathy  -maintain mean arterial pressure greater than 65  -given furosemide 80 mg IV x1  -consent for CVVHD was obtained by me and discussed with the wife, all questions were answered   -patient already has an hemodialysis catheter in place  -will plan to start CVVHD given the patient's critical condition  -will monitor the urine output closely, if the patient does have a response to the Lasix bolus, we could start a continuous furosemide infusion and monitor even off CVVHD if needed  -will start a 4 K/3 calcium bath, 2 9 L/hr therapy fluid, blood flow rate 250 cc/minute, and run even at this time  -monitor urine output closely after the Lasix bolus     Past Medical/Surgical History:    Active Ambulatory Problems     Diagnosis Date Noted    Aortic stenosis, mild 12/17/2013    Essential hypertension 06/28/2012    Hyperlipidemia 06/28/2012    Left bundle branch block 08/20/2014    Hypokalemia 03/04/2016    Increased BMI 09/14/2017    Murmur 11/26/2013    Osteoarthritis of both knees 01/07/2014    Pericardial disease 11/03/2014    Sciatica 02/10/2014    Age-related cataract of right eye 09/14/2017    Venous insufficiency 08/11/2014    Bilateral leg edema 06/11/2018    Bilateral leg pain 06/11/2018    Severe sepsis with septic shock (CODE) ([facility] ) 01/23/2019    Acute encephalopathy 01/23/2019    NSTEMI (non-ST elevated myocardial infarction) ([facility] ) 01/23/2019    PATRICK (acute kidney injury) ([place] [id number] ) 01/23/2019    Respiratory distress 01/23/2019    Stress-induced cardiomyopathy 01/24/2019    Acute respiratory failure with hypoxia (HCC) 01/24/2019    History of aortic valve replacement with bioprosthetic valve 01/24/2019    Rhabdomyolysis 01/24/2019    Atrial fibrillation ([facility] ) 01/24/2019     Resolved Ambulatory Problems     Diagnosis Date Noted    No Resolved Ambulatory Problems     Past Medical History:   Diagnosis Date    Allergic rhinitis     Finger fracture, right     Hemorrhoids     Hyperlipidemia     Hypertension      Admitting Diagnosis: Cardiogenic shock [R57 [id number]]  Age/Sex: 60 y o  male  BUN 45  CREATININE 3 64  WBC 11 12 PLATELETS 57  Admission Orders:  Scheduled Meds:   Current Facility-Administered Medications:  acetaminophen 650 mg Oral Q6H PRN Arnold Hurtado MD    albuterol 2 5 mg Nebulization Q4H PRN HERBERT Estes    bisacodyl 10 mg Rectal Daily PRN HERBERT Estes    cefazolin 2,000 mg Intravenous Q12H Donny Mejia PA-C    chlorhexidine 15 mL [employer] Q12H [address] HERBERT Estes    dexmedetomidine 0 1-0 7 mcg/kg/hr Intravenous Titrated HERBERT Estes Last Rate: 0 6 mcg/kg/hr (01/25/19 0912)   famotidine 20 mg Oral Daily Juan Manuel Cata Mejia PA-C    fentanyl citrate (PF) 50 mcg Intravenous Q1H PRN Arnold Hurtado MD    hydrocortisone sodium succinate 50 mg Intravenous Q6H [address] Yrn Santos DO    insulin lispro 5-25 Units Subcutaneous Q6H [address] Dena Ellington PA-C    metroNIDAZOLE 500 mg Intravenous Q8H Franklyn, PA-C Last Rate: 500 mg (01/25/19 0756)   milrinone (PRIMACOR) infusion 0 25 mcg/kg/min Intravenous Continuous Franklyn, PA-C Last Rate: 0 25 mcg/kg/min (01/25/19 0025)   norepinephrine 1-30 mcg/min Intravenous Titrated Franklyn, PA-C Last Rate: 15 mcg/min (01/25/19 0635)   NxStage K 4/Ca 3 20,000 mL Dialysis Continuous Zayda Mcintosh MD Last Rate: 0 mL (01/25/19 0511)   nystatin  Topical BID Franklyn, PA-C    propofol 5-50 mcg/kg/min Intravenous Titrated Shila Ore Cross, PA-C Last Rate: 10 mcg/kg/min (01/25/19 0911)   senna 8 8 mg Oral HS Franklyn, PA-C    vancomycin 2,000 mg Intravenous Q24H Franklyn, PA-C Last Rate: Stopped (01/24/19 2005)   vasopressin (PITRESSIN) in 0 9 % sodium chloride 100 mL 0 04 Units/min Intravenous Continuous Franklyn, PA-C Last Rate: 0 04 Units/min (01/25/19 0010)     Continuous Infusions:   dexmedetomidine 0 1-0 7 mcg/kg/hr Last Rate: 0 6 mcg/kg/hr (01/25/19 0912)   milrinone (PRIMACOR) infusion 0 25 mcg/kg/min Last Rate: 0 25 mcg/kg/min (01/25/19 0025)   norepinephrine 1-30 mcg/min Last Rate: 15 mcg/min (01/25/19 0635)   NxStage K 4/Ca 3 20,000 mL Last Rate: 0 mL (01/25/19 0511)   propofol 5-50 mcg/kg/min Last Rate: 10 mcg/kg/min (01/25/19 0911)   vasopressin (PITRESSIN) in 0 9 % sodium chloride 100 mL 0 04 Units/min Last Rate: 0 04 Units/min (01/25/19 0010)     PRN Meds:   acetaminophen    albuterol    bisacodyl    fentanyl citrate (PF)

## 2019-01-25 NOTE — CONSULTS
Full evaluation documented in flow sheet;  Recommendations: advance TF as medically approrpriate, progressing to goal of 52ml/hr to provide 2246 total kcal; 101 gm pro, 911ml free water

## 2019-01-25 NOTE — CONSULTS
[address] 60 y o  male MRN: [id number]  Unit/Bed#: MICU 11 Encounter: [id number]    ASSESSMENT and PLAN:    1 ) Acute kidney injury  -most likely secondary to ischemic acute tubular necrosis in the setting of septic shock  -no history of chronic kidney disease  Baseline creatinine appears to be around 0 7-0 8 mg/dL  -currently oligo-anuric  -renal function continues to worsen  -currently on a sodium bicarbonate drip, there is some evidence of pulmonary congestion on chest x-ray  -patient was having a worsening metabolic acidosis but the pH was still acceptable at 7 3 done on blood work at 4:00 p m   Earlier today  -currently being treated for septic shock, resulting in a degree of cardiomyopathy  -maintain mean arterial pressure greater than 65  -given furosemide 80 mg IV x1  -consent for CVVHD was obtained by me and discussed with the wife, all questions were answered   -patient already has an hemodialysis catheter in place  -will plan to start CVVHD given the patient's critical condition  -will monitor the urine output closely, if the patient does have a response to the Lasix bolus, we could start a continuous furosemide infusion and monitor even off CVVHD if needed  -will start a 4 K/3 calcium bath, 2 9 L/hr therapy fluid, blood flow rate 250 cc/minute, and run even at this time  -monitor urine output closely after the Lasix bolus    2 ) Blood pressure/volume status  -septic shock, currently on 2 pressors in the form of vasopressin and norepinephrine  -multifactorial shock, primarily septic shock with now underlying cardiogenic shock  -shock also lead to a degree of cardiomyopathy, call at least started on Milrinone 0 25 micrograms/kilogram per minute  -maintain mean arterial pressure greater than 65  -will run even on CVVHD  -I have given a dose of IV Lasix 80 mg x1  -chest x-ray shows so evidence of pulmonary edema  -would recommend discontinuing IV fluids after he has 1 hour into CVVHD  As not to compromise his volume status any further specially since he is now oliguric     3 ) Metabolic acidosis  -patient has a mild anion gap when corrected for the albumin, as well as a non gap metabolic acidosis  -appropriate respiratory compensation based on the last arterial blood gas  -lactic acid was elevated but trending down  -likely a type A lactic acidosis with superimposed acute kidney injury    4 ) Ventilatory dependent respiratory failure    5 ) Congestive heart failure  -ejection fraction 30%  -cardiomyopathy in the setting of septic shock with possible component of underlying cardiogenic shock  -started on a milrinone infusion  -currently on norepinephrine and vasopressin  -elevated troponins in the setting of sepsis    6 ) Gram-positive bacteremia/septic shock  -currently on broad-spectrum antibiotics  -plan for possible CHON to rule out a bioprosthetic aortic valve endocarditis    7 ) Severe aortic stenosis  -status post bioprosthetic AVR in July of 2014    SUMMARY OF RECOMMENDATIONS:    · Consent for CVVHD was obtained by the wife Leroy Street over the phone all risks were discussed  Patient is wife agrees to CVVHD  · Dialysis catheter has already been placed earlier today  · Discontinue sodium bicarbonate drip 1 hour after start of CVVHD  · Check lab work every 8 hours starting at 4:00 a m    · Electrolyte replacement protocol  · Repeat an arterial blood gas in the morning  · Furosemide 80 mg IV x1  · Monitor urine response to the IV Lasix  · Maintain mean arterial pressure greater than 65  · If there is improvement in the urine output and hemodynamics are stable would recommend starting a continuous furosemide infusion and possibly even holding CVVHD and monitoring    HISTORY OF PRESENT ILLNESS:  Requesting Physician: Charmaine Baekr DO  Reason for Consult:  Acute renal failure    Terrence Hawkins is a 60 y o  male who was admitted to [facility] on January 24th after presenting with shortness of breath, fever and chest pain  A renal consultation is requested today for assistance in the management of acute renal failure metabolic acidosis  Most history is obtained from the chart  Was reported the patient was in his usual state of health when he presented for shortness of breath fevers and chest pain  He was found to be cold and clammy and hypoxic as well as tachycardic  He was given 6 mg of adenosine which again showed sinus tachycardia with a heart rate in the 120s  He was found to be hypotensive and is currently in the intensive care unit  He has started on 2 pressors  Complicated by cardiomyopathy and now on milrinone infusion as well  Patient has had worsening renal function during the course of the last 24 hours  His baseline creatinine is normal     PAST MEDICAL HISTORY:  Past Medical History:   Diagnosis Date    Allergic rhinitis     last assessed 9/12/12    Finger fracture, right     Closed fx of the middle phalanx of the right 5th finger  last assessed 1/30/14    Hemorrhoids     last assessed 2/10/14    Hyperlipidemia     Hypertension        PAST SURGICAL HISTORY:  Past Surgical History:   Procedure Laterality Date    AORTIC VALVE REPLACEMENT  07/30/2014    AVR with 25mm [facility] Ease bovine pericardial valve    CARDIAC CATHETERIZATION  07/18/2014    SLB left main-normal, circumflex-normal, ramus intermedius-normal, RCA was large and dominant giving rise to the PDA & a large posterolateral branch  No disease    last assessed 8/19/14     KNEE CARTILAGE SURGERY Left     medial meniscus tear     TESTICLE SURGERY      TONSILLECTOMY AND ADENOIDECTOMY      TOOTH EXTRACTION         ALLERGIES:  Allergies   Allergen Reactions    Penicillins Rash       SOCIAL HISTORY:  History   Alcohol Use No     Comment: ocassion /never drank alcohol per [employer]      History   Drug Use No     History   Smoking Status    Never Smoker   Smokeless Tobacco    Never Used       FAMILY HISTORY:  Family History   Problem Relation Age of Onset    Lung cancer Mother     Heart disease Mother         pacemaker placement     Coronary artery disease Father     Hypertension Father     Heart attack Father     Cancer Family         bladder        MEDICATIONS:    Current Facility-Administered Medications:     acetaminophen (TYLENOL) oral suspension 650 mg, 650 mg, Oral, Q6H PRN, Sterling Herrera MD, 650 mg at 01/24/19 1946    albuterol inhalation solution 2 5 mg, 2 5 mg, Nebulization, Q4H PRN, Francy Landa PA-C    [START ON 1/25/2019] aspirin (ECOTRIN) EC tablet 325 mg, 325 mg, Oral, Daily, Francy Landa PA-C    ceFAZolin (ANCEF) IVPB (premix) 2,000 mg, 2,000 mg, Intravenous, Q8H, Vimal Melvin DO, Last Rate: 100 mL/hr at 01/24/19 1625, 2,000 mg at 01/24/19 1625    chlorhexidine (PERIDEX) 0 12 % oral rinse 15 mL, 15 mL, Swish & Spit, Q12H [address], JIM StuartC, 15 mL at 01/24/19 2040    dexmedetomidine (PRECEDEX) 200 mcg in sodium chloride 0 9 % 50 mL infusion, 0 1-0 7 mcg/kg/hr, Intravenous, Titrated, Francy Landa PA-C, Last Rate: 23 1 mL/hr at 01/24/19 2010, 0 6 mcg/kg/hr at 01/24/19 2010    fentanyl citrate (PF) 100 MCG/2ML 50 mcg, 50 mcg, Intravenous, Q1H PRN, Sterling Herrera MD, 50 mcg at 01/24/19 2021    [START ON 1/25/2019] hydrocortisone sodium succinate (PF) (Solu-CORTEF) injection 50 mg, 50 mg, Intravenous, Q6H [address], Get Evans DO    metroNIDAZOLE (FLAGYL) IVPB (premix) 500 mg, 500 mg, Intravenous, Q8H, Francy Landa PA-C, Last Rate: 200 mL/hr at 01/24/19 1721, 500 mg at 01/24/19 1721    Milrinone Lactate in Dextrose (PRIMACOR) 20 MG/100 ML infusion (premix), 0 25 mcg/kg/min, Intravenous, Continuous, Francy Landa PA-C, Last Rate: 10 9 mL/hr at 01/24/19 1855, 0 25 mcg/kg/min at 01/24/19 1855    norepinephrine (LEVOPHED) 4 mg (STANDARD CONCENTRATION) IV in sodium chloride 0 9% 250 mL, 1-30 mcg/min, Intravenous, Titrated, Franklyn PA-C, Last Rate: 67 5 mL/hr at 01/24/19 2050, 18 mcg/min at 01/24/19 2050    NxStage K 4/Ca 3 dialysis solution (RFP-401) 20,000 mL, 20,000 mL, Dialysis, Continuous, Pillo Duckworth MD    nystatin (MYCOSTATIN) powder, , Topical, BID, CECILE Estes-ANA    sodium bicarbonate 150 mEq in dextrose 5 % 1,000 mL infusion, 100 mL/hr, Intravenous, Continuous, CECILE Estes-C, Last Rate: 100 mL/hr at 01/24/19 1923, 100 mL/hr at 01/24/19 1923    [START ON 1/25/2019] thiamine (VITAMIN B1) 200 mg in sodium chloride 0 9 % 50 mL IVPB, 200 mg, Intravenous, Daily, HERBERT Estes    vancomycin (VANCOCIN) 2,000 mg in sodium chloride 0 9 % 500 mL IVPB, 2,000 mg, Intravenous, Q24H, CECILE Estes-C, Last Rate: 250 mL/hr at 01/24/19 1800, 2,000 mg at 01/24/19 1800    vasopressin (PITRESSIN) 20 Units in sodium chloride 0 9 % 100 mL infusion, 0 04 Units/min, Intravenous, Continuous, CECILE Estes-C, Last Rate: 12 mL/hr at 01/24/19 1500, 0 04 Units/min at 01/24/19 1500    REVIEW OF SYSTEMS:  Unable to obtain    PHYSICAL EXAM:  Current Weight: Weight - Scale: (!) 145 kg (319 lb 3 6 oz)  First Weight: Weight - Scale: (!) 145 kg (319 lb 3 6 oz)  Vitals:    01/24/19 2040 01/24/19 2050 01/24/19 2100 01/24/19 2115   BP:       BP Location:       Pulse: (!) 120 (!) 108 (!) 114 (!) 112   Resp: (!) 24 (!) 24 (!) 24 (!) 25   Temp: (!) 102 2 °F (39 °C) (!) 102 1 °F (38 9 °C) (!) 102 2 °F (39 °C) (!) 102 1 °F (38 9 °C)   TempSrc:       SpO2: 94% 94% 94%    Weight:       Height:           Intake/Output Summary (Last 24 hours) at 01/24/19 2146  Last data filed at 01/24/19 2100   Gross per 24 hour   Intake          1039 28 ml   Output               30 ml   Net          1009 28 ml     Physical Exam   Constitutional: No distress  HENT:   Head: Normocephalic and atraumatic  Eyes: Pupils are equal, round, and reactive to light  No scleral icterus  Neck: Normal range of motion  Neck supple     Cardiovascular: Normal rate, regular rhythm and normal heart sounds  Exam reveals no gallop and no friction rub  No murmur heard  Pulmonary/Chest: Effort normal  No respiratory distress  He has no wheezes  He has rales  He exhibits no tenderness  Abdominal: Soft  Bowel sounds are normal  He exhibits no distension  There is no tenderness  There is no rebound  Musculoskeletal: Normal range of motion  He exhibits edema  Skin: No rash noted  He is not diaphoretic  Psychiatric: He has a normal mood and affect  Nursing note and vitals reviewed  Invasive Devices:   Urethral Catheter Latex (Active)   Amt returned on insertion(mL) 80 mL 1/23/2019  5:24 PM   Site Assessment Clean;Skin intact; Patent 1/24/2019  7:20 PM   Collection Container Standard drainage bag 1/24/2019  7:20 PM   Securement Method Securing device (Describe) 1/24/2019  7:20 PM   Output (mL) 5 mL 1/24/2019  7:20 PM     Lab Results:     Results from last 7 days  Lab Units 01/24/19  1613 01/24/19  0502 01/24/19  0207 01/23/19  2303  01/23/19  1600   WBC Thousand/uL 12 29* 12 10*  --  12 27*  --  16 01*   HEMOGLOBIN g/dL 13 8 13 3  --  14 4  --  14 6   HEMATOCRIT % 44 2 43 4  --  46 4  --  47 0   PLATELETS Thousands/uL 64* 81*  --  83*  --  112*   POTASSIUM mmol/L 3 9 3 7 3 6  --   < > 3 9   CHLORIDE mmol/L 107 106 104  --   < > 99*   CO2 mmol/L 17* 22 22  --   < > 25   BUN mg/dL 52* 45* 44*  --   < > 34*   CREATININE mg/dL 4 03* 3 70* 3 06*  --   < > 2 63*   CALCIUM mg/dL 6 6* 7 0* 7 5*  --   < > 8 0*   MAGNESIUM mg/dL 2 2 2 0  --   --   --  2 0   PHOSPHORUS mg/dL 4 6* 4 4  --   --   --   --    ALK PHOS U/L 73  --   --   --   --  89   ALT U/L 344*  --   --   --   --  90*   AST U/L 1,513*  --   --   --   --  242*   < > = values in this interval not displayed

## 2019-01-25 NOTE — PLAN OF CARE
CARDIOVASCULAR - ADULT     Maintains optimal cardiac output and hemodynamic stability Progressing     Absence of cardiac dysrhythmias or at baseline rhythm Progressing        METABOLIC, FLUID AND ELECTROLYTES - ADULT     Electrolytes maintained within normal limits Progressing     Fluid balance maintained Progressing        Potential for Falls     Patient will remain free of falls Progressing        Prexisting or High Potential for Compromised Skin Integrity     Skin integrity is maintained or improved Progressing        SAFETY,RESTRAINT: NV/NON-SELF DESTRUCTIVE BEHAVIOR     Remains free of harm/injury (restraint for non violent/non self-detsructive behavior) Progressing     Returns to optimal restraint-free functioning Progressing          Nutrition/Hydration-ADULT     Nutrient/Hydration intake appropriate for improving, restoring or maintaining nutritional needs Not Progressing

## 2019-01-25 NOTE — CONSULTS
Consultation - Infectious Disease   Juan Alberto Ch 60 y o  male MRN: [id number]  [facility]: [id number]      IMPRESSION & RECOMMENDATIONS:   Impression/Recommendations:  1  Septic shock  Fever, tachycardia, leukocytosis, hypotension  Also with component of cardiogenic shock  Likely precipitated by MSSA bacteremia  No other clear evidence of acute infection identified  Fevers and leukocytosis trending downward  Patient remains on multiple vasopressors     -antibiotic plan as below  -monitor temperatures and hemodynamics  -check CBC in a m   -supportive care    2  MSSA bacteremia  Strong possibility of endocarditis in the setting of bioprosthetic AVR  CHON with no evidence of valvular vegetations  Initial portal of entry may have been related to multiple upper back wounds, which appear scabbed over with no overt evidence of acute infection on exam   CT chest/abdomen/pelvis with no other evidence of acute infection  Repeat blood cultures drawn on 01/24 remain positive     -continue with renally dosed IV cefazolin  -no further need for vancomycin  -recheck blood cultures x2 sets in the a m     3  History of bioprosthetic AVR  Secondary to degenerative AS  Placed in July 2014  4  Acute kidney injury  Likely ischemic/septic ATN  Currently on CRRT  Nephrology following closely  Will dose adjust antibiotic with CRRT  5  Acute hypoxic respiratory failure  Likely in the setting of septic shock as well as acute systolic cardiomyopathy/volume overload  Recent CT chest with no evidence to suggest pulmonary infection  -wean off ventilator as able  -monitor secretions  -monitor O2 requirements    6  Elevated CPK  Unclear etiology  Patient with no reported recent fall or traumatic injury  CPK level trending down  Continue to monitor for now  7  Abnormal LFTs  Likely secondary to shock state  LFT slowly trending down    Recheck CMP in a m       Antibiotics:  Vancomycin/cefazolin/Flagyl    Discussed above plan with critical care team   Thank you for this consultation  We will follow along with you  HISTORY OF PRESENT ILLNESS:  Reason for Consult:  Bacteremia    HPI: Juan Alberto Ch is a 60 y o  male with history of bioprosthetic AVR in 2014 who initially presented to [address] [place] [address],[address] with complain have nonspecific chest pain, shortness of breath  Noted to be hypoxic with SVT  Patient was started empirically on vancomycin and cefepime  Ultimately required intubation and initiation have vasopressors  Also noted to have acute renal failure requiring CVVH  Ultimately blood cultures drawn on admission were positive for GPCs in clusters  Antibiotic was changed to vancomycin and cefazolin  Patient was transferred to [facility] for higher level of care  He remains intubated, sedated, on multiple vasopressors  Remains on CVVH  Preliminary CHON checked today with no valvular vegetations  CPK was noted to be elevated at 60,000 thousand and now trending downward  Patient was not found down  Reportedly patient's wife states that he was stumbling around and appeared to have lower extremity weakness prior to hospitalization  REVIEW OF SYSTEMS:  A complete system-based review of systems is otherwise negative      PAST MEDICAL HISTORY:  Past Medical History:   Diagnosis Date    Allergic rhinitis     last assessed 9/12/12    Finger fracture, right     Closed fx of the middle phalanx of the right 5th finger  last assessed 1/30/14    Hemorrhoids     last assessed 2/10/14    Hyperlipidemia     Hypertension      Past Surgical History:   Procedure Laterality Date    AORTIC VALVE REPLACEMENT  07/30/2014    AVR with 25mm [facility] Ease bovine pericardial valve    CARDIAC CATHETERIZATION  07/18/2014    SLB left main-normal, circumflex-normal, ramus intermedius-normal, RCA was large and dominant giving rise to the PDA & a large posterolateral branch  No disease  last assessed 14     KNEE CARTILAGE SURGERY Left     medial meniscus tear     TESTICLE SURGERY      TONSILLECTOMY AND ADENOIDECTOMY      TOOTH EXTRACTION         FAMILY HISTORY:  Non-contributory    SOCIAL HISTORY:  History   Alcohol Use No     Comment: ocassion /never drank alcohol per [employer]      History   Drug Use No     History   Smoking Status    Never Smoker   Smokeless Tobacco    Never Used       ALLERGIES:  Allergies   Allergen Reactions    Penicillins Rash     However, has subsequently tolerated Cefazolin and Cefepime       MEDICATIONS:  All current active medications have been reviewed  PHYSICAL EXAM:  Vitals:  Temp:  [97 9 °F (36 6 °C)-102 2 °F (39 °C)] 98 2 °F (36 8 °C)  HR:  [] 112  Resp:  [18-34] 20  BP: ()/(54-72) 124/72  SpO2:  [93 %-98 %] 96 %  Temp (24hrs), Av 6 °F (37 6 °C), Min:97 9 °F (36 6 °C), Max:102 2 °F (39 °C)  Current: Temperature: 98 2 °F (36 8 °C)     Physical Exam:  General:  Intubated, sedated  Eyes:  Conjunctive clear with no hemorrhages or effusions  Oropharynx:  ET tube in place  Neck:  Supple, no lymphadenopathy  Lungs:  Scattered coarse bilateral breath sounds  Cardiac:  Regular rate and rhythm, systolic click  Abdomen:  Soft, non-tender, non-distended  Extremities:  No peripheral cyanosis, clubbing, or edema  Skin:  Multiple scabbed over lesions on upper back  Neurological:  Sedated      LABS, IMAGING, & OTHER STUDIES:  Lab Results:  I have personally reviewed pertinent labs      Results from last 7 days  Lab Units 19  0410 19  1613 19  0502  19  1600   POTASSIUM mmol/L 3 7 3 9 3 7  < > 3 9   CHLORIDE mmol/L 104 107 106  < > 99*   CO2 mmol/L 20* 17* 22  < > 25   BUN mg/dL 45* 52* 45*  < > 34*   CREATININE mg/dL 3 64* 4 03* 3 70*  < > 2 63*   EGFR ml/min/1 73sq m 17 15 17  < > 25   CALCIUM mg/dL 7 5* 6 6* 7 0*  < > 8 0*   AST U/L 1,114* 1,513*  --   --  242*   ALT U/L 323* 344*  --   -- 90*   ALK PHOS U/L 80 73  --   --  89   < > = values in this interval not displayed  Results from last 7 days  Lab Units 01/25/19  0410 01/24/19  1613 01/24/19  0502   WBC Thousand/uL 11 12* 12 29* 12 10*   HEMOGLOBIN g/dL 13 5 13 8 13 3   PLATELETS Thousands/uL 57* 64* 81*       Results from last 7 days  Lab Units 01/24/19  1255 01/24/19  1008 01/23/19  2301 01/23/19  1850 01/23/19  1730 01/23/19  1654 01/23/19  1600   BLOOD CULTURE   --   --   --   --   --  Staphylococcus aureus* Staphylococcus aureus*   SPUTUM CULTURE   --  Culture too young- will reincubate  --   --   --   --   --    GRAM STAIN RESULT  Gram positive cocci in clusters  Gram positive cocci in clusters No Polys or Bacteria seen  --   --   --  Gram positive cocci in clusters Gram positive cocci in clusters   URINE CULTURE   --   --   --   --  7267-9030 cfu/ml Gram Positive Cocci*  --   --    INFLUENZA B PCR   --   --   --  None Detected  --   --   --    RSV PCR   --   --   --  None Detected  --   --   --    LEGIONELLA URINARY ANTIGEN   --   --  Negative  --   --   --   --          Imaging Studies:   I have personally reviewed pertinent imaging study reports and images in PACS  CT chest/abdomen/pelvis shows patchy consolidative changes at bilateral lung bases likely atelectasis    CT head shows area of low density/diminished gray-white distinction in the inferior right frontal lobe likely artifactual    EKG, Pathology, and Other Studies:   I have personally reviewed pertinent reports  2D echocardiogram with EF of 30%  No overt valvular vegetations  Neeraj with no evidence of endocarditis

## 2019-01-25 NOTE — MALNUTRITION/BMI
This medical record reflects one or more clinical indicators suggestive of  morbid obesity  Body mass index is 47 14 kg/m²  See Nutrition note dated 1/25/19 for additional details  Completed nutrition assessment is viewable in the nutrition documentation

## 2019-01-25 NOTE — PROGRESS NOTES
NEPHROLOGY PROGRESS NOTE   Juan Alberto Ch 60 y o  male MRN: [id number]  [facility]: [id number]  Reason for Consult: PATRICK    ASSESSMENT AND PLAN:  Patient is 50-year-old male with CHF, EF 30%, status post bioprosthetic AVR initially presented after having episode of fever, becoming confused and significant dyspnea  Was transferred to [facility] due to further do tried eating medical condition requiring intubation, multiple pressors due to significant hypotension, septic shock  We are consulted for ST  DAVID ADAM  Oligo anuric PATRICK, baseline serum creatinine 0 7 to 0 8  -PATRICK most likely secondary to ischemic/septic ATN  -due to progressive worsening renal failure, concern for mild volume overload, worsening acidosis, patient was started on CVVHD on 1/24/19  Patient seen and examined on CVVHD at 7:50 a m  [place] Patient had one time filter change overnight  Currently otherwise tolerating CRRT well at the time of my encounter  Remaining even UF  -continue current CRRT prescription  Goal UF removal 0 to 50 mL/hour as blood pressure tolerated  -continue to closely monitor intake and output  Avoid nephrotoxins or NSAIDs  -urinalysis showed very concentrated sample, greater than 3+ proteinuria, innumerable RBCs, filled obscured four WBCs/bacteria  Positive nitrate   -recent CT scan without contrast shows no hydronephrosis, otherwise unremarkable kidneys    Septic shock/component of cardiogenic shock, MSSA bacteremia  -plan for CHON to evaluate for endocarditis in the setting of bioprosthetic aortic valve  0 antibiotic as per ICU team   Currently remains on Levophed and vasopressin  Metabolic acidosis  -lactic acid improved to 1 3   -likely secondary to worsened renal failure in the setting of septic shock   -bicarb is slowly improving at 20  PH 7 40  Continue to monitor closely on CRRT  Now remains off bicarb drip      Mild volume overload,  -UF removal as above as blood pressure tolerated  -CHF with EF 30%  Close cardiology follow-up  -urine output remains poor  History of aortic stenosis, status post bioprosthetic AVR in 2014  VDRF, 40% FiO2    Discussed with ICU team    SUBJECTIVE:  Patient seen and examined at bedside  Remains critically ill and intubated  Patient had one filter changed on CRRT although otherwise tolerating CRRT well  Still requiring Levophed and vasopressin  40% FiO2 on ventilator       OBJECTIVE:  Current Weight: Weight - Scale: (!) 145 kg (319 lb 3 6 oz)  Vitals:    01/25/19 0915   BP:    Pulse:    Resp:    Temp:    SpO2: 96%       Intake/Output Summary (Last 24 hours) at 01/25/19 7816  Last data filed at 01/25/19 0800   Gross per 24 hour   Intake          4238 39 ml   Output             2036 ml   Net          2202 39 ml     Physical Examination:  General:  Lying in bed, intubated   Eyes:  Mild conjunctival pallor present  ENT:  ET tube present  Neck:  Supple  Respiratory:  Bilateral coarse breath sound present  CVS:  S1, S2 present  GI:  Soft, nontender, nondistended  CNS:  Intubated, sedated, does not follow commands  Extremities:  Trace to 1+ edema in legs  Skin:  No new rash in legs    Medications:    Current Facility-Administered Medications:     acetaminophen (TYLENOL) oral suspension 650 mg, 650 mg, Oral, Q6H PRN, Oxana Jefferson MD, 650 mg at 01/24/19 1946    albuterol inhalation solution 2 5 mg, 2 5 mg, Nebulization, Q4H PRN, Winsome Ruiz, PA-C    bisacodyl (DULCOLAX) rectal suppository 10 mg, 10 mg, Rectal, Daily PRN, Winsome Ruiz, PA-C    ceFAZolin (ANCEF) IVPB (premix) 2,000 mg, 2,000 mg, Intravenous, Q12H, Donny Mejia PA-C    chlorhexidine (PERIDEX) 0 12 % oral rinse 15 mL, 15 mL, [place], Q12H [address], Winsome Ruiz, PA-C, 15 mL at 01/25/19 0835    dexmedetomidine (PRECEDEX) 200 mcg in sodium chloride 0 9 % 50 mL infusion, 0 1-0 7 mcg/kg/hr, Intravenous, Titrated, Winsome Ruiz, PA-C, Last Rate: 23 1 mL/hr at 01/25/19 0912, 0 6 mcg/kg/hr at 01/25/19 0912    famotidine (PEPCID) oral suspension 20 mg, 20 mg, Oral, Daily, Donny Mejia PA-C    fentanyl citrate (PF) 100 MCG/2ML 50 mcg, 50 mcg, Intravenous, Q1H PRN, Horacio Benton MD, 50 mcg at 01/25/19 0320    hydrocortisone sodium succinate (PF) (Solu-CORTEF) injection 50 mg, 50 mg, Intravenous, Q6H [facility], Karyle Horns, DO, 50 mg at 01/25/19 0629    insulin lispro (HumaLOG) 100 units/mL subcutaneous injection 5-25 Units, 5-25 Units, Subcutaneous, Q6H [facility], 5 Units at 01/25/19 0853 **AND** Fingerstick Glucose (POCT), , , Q6H, Vita Raines PA-C    metroNIDAZOLE (FLAGYL) IVPB (premix) 500 mg, 500 mg, Intravenous, Q8H, Vita Raines PA-C, Last Rate: 200 mL/hr at 01/25/19 0756, 500 mg at 01/25/19 0756    Milrinone Lactate in Dextrose (PRIMACOR) 20 MG/100 ML infusion (premix), 0 25 mcg/kg/min, Intravenous, Continuous, Vita Raines PA-C, Last Rate: 10 9 mL/hr at 01/25/19 0025, 0 25 mcg/kg/min at 01/25/19 0025    norepinephrine (LEVOPHED) 4 mg (STANDARD CONCENTRATION) IV in sodium chloride 0 9% 250 mL, 1-30 mcg/min, Intravenous, Titrated, Vita Raines PA-C, Last Rate: 56 3 mL/hr at 01/25/19 0635, 15 mcg/min at 01/25/19 1051    NxStage K 4/Ca 3 dialysis solution (RFP-401) 20,000 mL, 20,000 mL, Dialysis, Continuous, Mannie Jerez MD, Last Rate: 0 mL/hr at 01/25/19 0511, 20,000 mL at 01/25/19 9331    nystatin (MYCOSTATIN) powder, , Topical, BID, Vita Raines PA-C    propofol (DIPRIVAN) 1000 mg in 100 mL infusion (premix), 5-50 mcg/kg/min, Intravenous, Titrated, Leanord Councilman, PA-C, Last Rate: 17 4 mL/hr at 01/25/19 0941, 20 mcg/kg/min at 01/25/19 0941    senna 8 8 mg/5 mL oral syrup 8 8 mg, 8 8 mg, Oral, HS, Vita Raines PA-C    vancomycin (VANCOCIN) 2,000 mg in sodium chloride 0 9 % 500 mL IVPB, 2,000 mg, Intravenous, Q24H, Vita Raines PA-C, Stopped at 01/24/19 2005    vasopressin (PITRESSIN) 20 Units in sodium chloride 0 9 % 100 mL infusion, 0 04 Units/min, Intravenous, Continuous, [place], [place], Last Rate: 12 mL/hr at 01/25/19 0010, 0 04 Units/min at 01/25/19 0010    Laboratory Results:    Results from last 7 days  Lab Units 01/25/19  0410 01/24/19  1613 01/24/19  0502 01/24/19  0207 01/23/19  2303 01/23/19  2251 01/23/19  1850 01/23/19  1600   WBC Thousand/uL 11 12* 12 29* 12 10*  --  12 27*  --   --  16 01*   HEMOGLOBIN g/dL 13 5 13 8 13 3  --  14 4  --   --  14 6   HEMATOCRIT % 42 5 44 2 43 4  --  46 4  --   --  47 0   PLATELETS Thousands/uL 57* 64* 81*  --  83*  --   --  112*   POTASSIUM mmol/L 3 7 3 9 3 7 3 6  --  4 0 3 9 3 9   CHLORIDE mmol/L 104 107 106 104  --  103 102 99*   CO2 mmol/L 20* 17* 22 22  --  21 25 25   BUN mg/dL 45* 52* 45* 44*  --  41* 36* 34*   CREATININE mg/dL 3 64* 4 03* 3 70* 3 06*  --  2 87* 2 29* 2 63*   CALCIUM mg/dL 7 5* 6 6* 7 0* 7 5*  --  7 4* 8 2* 8 0*   MAGNESIUM mg/dL 2 0 2 2 2 0  --   --   --   --  2 0   PHOSPHORUS mg/dL 3 1 4 6* 4 4  --   --   --   --   --        Results for orders placed during the hospital encounter of 01/24/19   XR chest portable    Narrative CHEST     INDICATION:   ETT check  COMPARISON:  January 24, 2019  EXAM PERFORMED/VIEWS:  XR CHEST PORTABLE      FINDINGS:      Endotracheal tube is present, in satisfactory position with its tip above the level of the raad  Enteric tube is present with its tip extending below the left hemidiaphragm  Right and left internal jugular central venous catheters are noted  Sternotomy wires are present  Arch is mildly enlarged  Prosthetic cardiac valve is noted  Mild pulmonary vascular congestive changes are seen  No significant pleural effusion  No pneumothorax  No acute osseous abnormality  Impression Expected positioning of endotracheal and enteric tubes  A right internal jugular central venous catheter has been changed since previous examination with a new one being larger in caliber  No pneumothorax          Workstation performed: [id number]       No results found for this or any previous visit  No results found for this or any previous visit  No results found for this or any previous visit  No results found for this or any previous visit  No results found for this or any previous visit  Portions of the record may have been created with voice recognition software  Occasional wrong word or "sound a like" substitutions may have occurred due to the inherent limitations of voice recognition software  Read the chart carefully and recognize, using context, where substitutions have occurred

## 2019-01-25 NOTE — PROGRESS NOTES
Progress Note - Critical Care   Juan Alberto Ch 60 y o  male MRN: [id number]  [facility]: [id number]    Assessment:   Principal Problem:    Cardi[facility] ([facility] )  Active Problems:    Acute encephalopathy    NSTEMI (non-ST elevated myocardial infarction) ([facility] )    PATRICK (acute kidney injury) ([facility] )    Stress-induced cardiomyopathy    Acute respiratory failure with hypoxia (HCC)    History of aortic valve replacement with bioprosthetic valve    Septic shock ([facility])    Oliguria    Gram-positive bacteremia    Acute renal failure (ARF) ([facility])    Transaminitis  Resolved Problems:    * No resolved hospital problems  *      Plan  Neuro:   · PAD  · Pain controlled with: prn Fentanyl  · Sedation plan/Daily sedation holiday: Continue Dex  · RASS goal: -5 Unarousable and -2 Light Sedation  · Delirium Precautions  · CAM ICU per protocol  · Regulate sleep/wake cycle+  · Trend neuro exam  · Repeat Head CT when pt more HD stable    CV:   · Hemodynamic infusions: Primacor,  25 mcg/kg/min, Levophed, 16 mcg/min, Vasopressin,  04 Units/min  · Wean Levo as able  · MAP goal > 65  · Rhythm: Atrial Fibrillation  · Follow rhythm on telemetry  · Continue stress dose steroids Hydrocortisone 50 q6h  · Cardiology planning CHON for today    Lung:   · Daily SBT assessment in coordination with SAT per protocol  ·  Chlorhexidine/HOB>30degrees ordered: yes  · Pulmonary toileting  · Wean ventsupport as able    GI:   · Stress ulcer prophylaxis: for stress ulcer prophylaxis PO Pepcid   · Bowel regimen: Sennakot, Miralax and Dulcolax Suppository  · Transaminitis: trend LFT's  · Continue NPO until pressors requirements reduced      FEN:   · Goal 24 hour fluid balance: Keep pt even until pressor requirements reduced   Fluid/Diuretic plan: D/C Bicarb infusion  · Nutrition/diet plan: NPO  · Replete electrolytes with goals: K >4 0, Mag >2 0, and Phos >3 0  · K repleted, Ca repleted  :   · Indwelling Turner present: yes   · Trend UOP and BUN/creat  · Strict I and O  · ARF: CRRT initiated and running, UF: run even    ID:   · Abx ordered: Ancef, vancomycin and metronidazole  · 1/24 Bcx x2: (+) GPC, Ucx: (+) GPC  · Trend temps and WBC count  · Continue to follow cultures and resp  Panel  Heme:   · Trend hgb and plts  · Transfuse as needed for goal hgb >7  · Thrombocytopenia: trend cbc    Endo:   · Glycemic control plan: Blood glucose controlled on current regimen and Blood glucose not controlled  Start SQ insulin  Algorithm 6  · Monitor POCT BS q6h    MSK/Skin:  · Mobility goal: Bedrest  · PT consult: yes  · OT consult: yes  · Frequent turning and pressure off-loading  · Neck/Back excoriations/scratches: mupirocin ointment qd    Family:  · Family updated within 24 hours: yes   · Family meeting planned today: yes   Lines:  · Central venous access: LIJ triple lumen catheter - 20 cm RIJ Dialysis Catheter 16 cm  · Keep central line today for meds requiring central line, hemodynamic monitoring, dialysis  · Arterial line: Assessed  Continued for the following reasons HD monitoring, frequent ABG  VTE Prophylaxis:  · Pharmacologic Prophylaxis:Heparin  · Mechanical Prophylaxis: sequential compression device    Disposition: Continue ICU care      ______________________________________________________________________  Chief Complaint: AMS, Fevers    HPI  Yue Martínez is a 60 y o  male who called EMS after experiencing 2 days of worsening malaise and weakness, Wednesday afternoon the patient became extremely confused  EMS arrived and once evaluated realized the patient was in SVT, given adenosine which was successful  The patient was taken to [facility] [place] [facility] where he was seen having shortness of breath, EKGs showed no ST changes just a known left bundle branch block   Patient was given 1 nitro sublingual  Pt developed severe respiratory distress and was intubated  Pt was pan cultured and empiric antibiotics were initiated along with fluid boluses   Labs at [facility] showed leukocytosis, troponin spill >30 x 2 and metabolic acidosis  Pt became hypotensive started on pressors and bicarb infusion  Bedside echo showed EF of 30%  H/o LBBB and an Aortic valve repair in , recent Echo in 2018 showed EF of 50%  Patient was then [facility] for further evaluation      Of note pts wife described the patients posterior neck and scalp as being bright red and hot on Monday  Today he has well defined scratches in those areas that broke skin  24hr events: Cardiology, Renal consulted  Renal gave 80 mg Lasix without effect, CRRT initiated and pt kept even  Bicarb infusion continued at 100 ml/hr through the night, Levo titrated down remained on Milrinone and Vaso  Tmax of 102 2 and pt has been in A  Fib with RVR  Review of Systems   Unable to perform ROS: Intubated     ______________________________________________________________________  Temperature:   Temp (24hrs), Av 9 °F (37 7 °C), Min:97 9 °F (36 6 °C), Max:102 8 °F (39 3 °C)    Current Temperature: 98 °F (36 7 °C)    Vitals:    19 0600 19 0615 19 0625 19 0635   BP:       BP Location:       Pulse: (!) 120 (!) 106 104 103   Resp: 22 22 (!) 24 (!) 24   Temp: 97 9 °F (36 6 °C) 97 9 °F (36 6 °C) 97 9 °F (36 6 °C) 98 °F (36 7 °C)   TempSrc:       SpO2: 98% 98% 97% 96%   Weight:       Height:         Arterial Line BP: 116/60  Arterial Line MAP (mmHg): 76 mmHg     Weights:   IBW: 70 7 kg    Body mass index is 47 14 kg/m²    Weight (last 2 days)     Date/Time   Weight    19 1530  (!)  145 (319 23)            Height: 5' 9" (175 3 cm)  Hemodynamic Monitoring:  PAP:  , CVP:  , SvO2:        Noninvasive/Ventilator Settings:  Ventilator Settings:   Vent Mode: AC/VC    Settings  Resp Rate (BPM): 20 BPM  Vt (mL): 500 mL  FIO2 (%): 40 %  PEEP (cmH2O): 5 cmH2O  Flow (LPM): 60 L/min    Observed  Resp Rate (BPM): 24 BPM  VT (mL): 569  MV: 12 8  Peak Pressure (cmH2O): 22 cmH2O  Plateau Pressure (cm H2O): 20 cm H2O  I/E Ratio (Obs): 1/2   Static Compliance (mL/cmH20): 0 mL/cmH2O  Lab Results   Component Value Date    PHART 7 405 01/25/2019    XCS2ZTJ 34 8 (L) 01/25/2019    PO2ART 70 0 (L) 01/25/2019    WJV0SRL 21 3 (L) 01/25/2019    BEART -2 7 01/25/2019    SOURCE Line, Arterial 01/25/2019     SpO2: SpO2: 96 %  ______________________________________________________________________  Physical Exam:  Sheikh Agitation Sedation Scale (RASS): Light sedation  Physical Exam   Constitutional: He appears well-developed  No distress  Morbidly obese   HENT:   Head: Normocephalic and atraumatic  Eyes: Pupils are equal, round, and reactive to light  Conjunctivae are normal  Right eye exhibits no discharge  Left eye exhibits no discharge  Neck: Normal range of motion  Neck supple  Cardiovascular:   afib w/ RVR, -130   Abdominal: Soft  He exhibits no distension  No bowel sounds heard   Genitourinary:   Genitourinary Comments: bo   Musculoskeletal: He exhibits edema  Neurological:   Sedated/intubated   Skin:          ______________________________________________________________________  Intake and Outputs:  I/O       01/23 0701 - 01/24 0700 01/24 0701 - 01/25 0700    I V  (mL/kg)  2759 5 (19)    NG/GT  50    IV Piggyback  965    Total Intake(mL/kg)  3774 5 (26)    Urine (mL/kg/hr)  40    Other  1711    Total Output   1751    Net   +2023 5              UOP: anuric   Nutrition:        Diet Orders            Start     Ordered    01/24/19 1512  Diet NPO  Diet effective now     Question Answer Comment   Diet Type NPO    RD to adjust diet per protocol?  Yes        01/24/19 1511        Labs:     Results from last 7 days  Lab Units 01/24/19  1613 01/24/19  0502 01/23/19  2303 01/23/19  1600   WBC Thousand/uL 12 29* 12 10* 12 27* 16 01*   HEMOGLOBIN g/dL 13 8 13 3 14 4 14 6   HEMATOCRIT % 44 2 43 4 46 4 47 0   PLATELETS Thousands/uL 64* 81* 83* 112*   NEUTROS PCT %  --   --   --  94*   MONOS PCT %  -- --   --  3*      Results from last 7 days  Lab Units 01/25/19  0410 01/24/19  1613 01/24/19  0502  01/23/19  1600   SODIUM mmol/L 136 138 142  < > 138   POTASSIUM mmol/L 3 7 3 9 3 7  < > 3 9   CHLORIDE mmol/L 104 107 106  < > 99*   CO2 mmol/L 20* 17* 22  < > 25   BUN mg/dL 45* 52* 45*  < > 34*   CREATININE mg/dL 3 64* 4 03* 3 70*  < > 2 63*   CALCIUM mg/dL 7 5* 6 6* 7 0*  < > 8 0*   ALK PHOS U/L 80 73  --   --  89   ALT U/L 323* 344*  --   --  90*   AST U/L 1,114* 1,513*  --   --  242*   < > = values in this interval not displayed  Results from last 7 days  Lab Units 01/25/19  0410 01/24/19  1613 01/24/19  0502   MAGNESIUM mg/dL 2 0 2 2 2 0       Results from last 7 days  Lab Units 01/25/19  0410 01/24/19  1613 01/24/19  0502   PHOSPHORUS mg/dL 3 1 4 6* 4 4        Results from last 7 days  Lab Units 01/25/19  0410 01/24/19  1613 01/24/19  0753 01/24/19  0041 01/23/19  1600   INR  0 94 1 04  --   --  1 45*   PTT seconds  --  46* 51* 48* 34       Results from last 7 days  Lab Units 01/24/19  1613   LACTIC ACID mmol/L 1 3       0  Lab Value Date/Time   TROPONINI 36 20 (H) 01/24/2019 2137   TROPONINI 36 30 (H) 01/24/2019 1831   TROPONINI 34 90 (H) 01/24/2019 1613   TROPONINI >40 00 (HH) 01/24/2019 0502   TROPONINI >40 00 () 01/24/2019 0207   TROPONINI 36 61 () 01/23/2019 2251   TROPONINI 24 29 ([facility]) 01/23/2019 1850   TROPONINI 14 20 () 01/23/2019 1600     Imaging: CXR - 01/24 - T/L good position, no PTX, bilateral central alveolar infiltrates, blunted CP angles, cardiomegaly, sternal wires     CT Head 1/23 - no acute mass, bleed, shift, artifactual vs subacute ischemic region in right frontal low density region     CT C/A/P - patchy GGO and regions of basilar atelectasis or infiltrate, no PTX, no acute intraabdominal pathology, no soft tissue fluid collections     TTE - EF 30%, severe diffuse hypokinesis, mod RV dilation I have personally reviewed pertinent reports      EKG: Atrial fibrillation with rapid ventricular response  Right axis deviation  Non-specific intra-ventricular conduction block  T wave abnormality, consider inferior ischemia or digitalis effect    Micro:  1/24:              Bcx x 2: Gram positive cocci               Ucx: GPC (+)              Influenza/lgeionella/Strep Pneumo Ag: negative              Resp Panel: pending              Scx: pending              Bcx x2: pending  Lab Results   Component Value Date    URINECX 9004-6855 cfu/ml Gram Positive Cocci (A) 01/23/2019     Allergies:    Allergies   Allergen Reactions    Penicillins Rash     Medications:   Scheduled Meds:  Current Facility-Administered Medications:  acetaminophen 650 mg Oral Q6H PRN Cristofer Powell MD    albuterol 2 5 mg Nebulization Q4H PRN Tara Martin PA-C    aspirin 325 mg Oral Daily Tara Martin PA-C    calcium gluconate 2 G 100 mL IVPB 2 g Intravenous Once Brice Flores MD Last Rate: 2 g (01/25/19 0505)   cefazolin 2,000 mg Intravenous Q8H Vimal Melvin DO Last Rate: Stopped (01/25/19 0035)   chlorhexidine 15 mL Swish & [place]it Q12H [address] Tara Martin PA-C    dexmedetomidine 0 1-0 7 mcg/kg/hr Intravenous Titrated Tara Martin PA-C Last Rate: 0 6 mcg/kg/hr (01/25/19 3143)   famotidine 20 mg Oral BID HERBERT LERMA    fentanyl citrate (PF) 50 mcg Intravenous Q1H PRN Cristofer Powell MD    hydrocortisone sodium succinate 50 mg Intravenous Q6H [address] Yrn Santos DO    insulin lispro 5-25 Units Subcutaneous Q6H [address] Patrick Ellington PA-C    metroNIDAZOLE 500 mg Intravenous Q8H Tara Martin PA-C Last Rate: Stopped (01/24/19 2350)   milrinone (PRIMACOR) infusion 0 25 mcg/kg/min Intravenous Continuous Tara Martin PA-C Last Rate: 0 25 mcg/kg/min (01/25/19 0025)   norepinephrine 1-30 mcg/min Intravenous Titrated Tara Martin PA-C Last Rate: 15 mcg/min (01/25/19 0635)   NxStage K 4/Ca 3 20,000 mL Dialysis Continuous Brice Flores MD Last Rate: 0 mL (01/25/19 0511)   nystatin  Topical BID Tara Martin, HERBERT    potassium chloride 40 mEq Intravenous Once Elisa El MD Last Rate: 40 mEq (01/25/19 0455)   thiamine 200 mg Intravenous Daily Prashanth Kiser PA-C    vancomycin 2,000 mg Intravenous Q24H Prashanth Kiser PA-C Last Rate: Stopped (01/24/19 2005)   vasopressin (PITRESSIN) in 0 9 % sodium chloride 100 mL 0 04 Units/min Intravenous Continuous Prashanth Kiser PA-C Last Rate: 0 04 Units/min (01/25/19 0010)     Continuous Infusions:  dexmedetomidine 0 1-0 7 mcg/kg/hr Last Rate: 0 6 mcg/kg/hr (01/25/19 0621)   milrinone (PRIMACOR) infusion 0 25 mcg/kg/min Last Rate: 0 25 mcg/kg/min (01/25/19 0025)   norepinephrine 1-30 mcg/min Last Rate: 15 mcg/min (01/25/19 0635)   NxStage K 4/Ca 3 20,000 mL Last Rate: 0 mL (01/25/19 0511)   vasopressin (PITRESSIN) in 0 9 % sodium chloride 100 mL 0 04 Units/min Last Rate: 0 04 Units/min (01/25/19 0010)     PRN Meds:    acetaminophen 650 mg Q6H PRN   albuterol 2 5 mg Q4H PRN   fentanyl citrate (PF) 50 mcg Q1H PRN       Invasive lines and devices: Invasive Devices     Central Venous Catheter Line            CVC Central Lines 01/24/19 Triple Left Internal jugular less than 1 day          Peripheral Intravenous Line            Peripheral IV 01/23/19 Left Antecubital 1 day    Peripheral IV 01/23/19 Right Antecubital 1 day    Peripheral IV 01/24/19 Right Hand 1 day          Arterial Line            Arterial Line 01/24/19 Right Radial less than 1 day          Line            Temporary HD Catheter less than 1 day          Drain            NG/OG/Enteral Tube Orogastric 14 Fr Right mouth 1 day    Urethral Catheter Latex 1 day          Airway            ETT  Cuffed 7 5 mm 1 day                   Counseling / Coordination of Care  Total Critical Care time spent 120 minutes excluding procedures, teaching and family updates  Code Status: Level 1 - Full Code    Portions of the record may have been created with voice recognition software    Occasional wrong word or "sound a like" substitutions may have occurred due to the inherent limitations of voice recognition software  Read the chart carefully and recognize, using context, where substitutions have occurred      Lisa Santos PA-C

## 2019-01-25 NOTE — RESPIRATORY THERAPY NOTE
RT Ventilator Management Note  Yue Martínez 60 y o  male MRN: [id number]  Unit/Bed#: [facility] 11 Encounter: [id number]      Daily Screen       1/23/2019 2002 1/24/2019 8065          Patient safety screen outcome[de-identified] Failed Failed      Not Ready for Weaning due to[de-identified] Hemodynamically unstable;Recieving paralytics Hemodynamically unstable; Underline problem not resolved;Poor inspiratory effort              Physical Exam:   Assessment Type: Assess only  General Appearance: Sedated  Respiratory Pattern: Assisted  Chest Assessment: Chest expansion symmetrical  Bilateral Breath Sounds: Clear, Diminished  Cough: None  Suction: ET Tube  O2 Device: vent      Resp Comments: con't on a/c settings 20/500/40%/+5 as ordered, blanca well, no resp distress, VS stable, no changes at this time, will con't to monitor

## 2019-01-25 NOTE — CONSULTS
Vancomycin Sign Off Note    Pauline Dos Santos is a 60 y o  male who was receiving vancomycin therapy  Vancomycin has been discontinued by Infectious Disease  Patient with MSSA bacteremia  Pharmacy will sign off at this point  Please call pharmacy with additional questions  Thank you,  Julia Finn, PharmD, [facility] Pharmacist

## 2019-01-25 NOTE — RESPIRATORY THERAPY NOTE
RT Ventilator Management Note  Caleb [address] 60 y o  male MRN: [id number]  Unit/Bed#: [facility] 11 Encounter: [id number]      Daily Screen       1/24/2019 0738 1/25/2019 0915          Patient safety screen outcome[de-identified] Failed Failed      Not Ready for Weaning due to[de-identified] Hemodynamically unstable; Underline problem not resolved;Poor inspiratory effort Underline problem not resolved              Physical Exam:   Assessment Type: Assess only  General Appearance: Sedated  Respiratory Pattern: Assisted  Chest Assessment: Chest expansion symmetrical  Bilateral Breath Sounds: Clear, Diminished  Suction: ET Tube  O2 Device: vent      Resp Comments: con't on a/c settings 20/500/40%/+5 as ordered, blanca well, no resp distress, VS stable, no wean at this time, underlying condtion not resolved  Will con;'t to monitor as per resp protocol  No prn tx needed at this time

## 2019-01-26 ENCOUNTER — APPOINTMENT (INPATIENT)
Dept: RADIOLOGY | Facility: HOSPITAL | Age: 59
DRG: 871 | End: 2019-01-26
Payer: COMMERCIAL

## 2019-01-26 PROBLEM — M79.605 BILATERAL LEG PAIN: Status: RESOLVED | Noted: 2018-06-11 | Resolved: 2019-01-26

## 2019-01-26 PROBLEM — R34 OLIGURIA: Status: RESOLVED | Noted: 2019-01-24 | Resolved: 2019-01-26

## 2019-01-26 PROBLEM — R65.21 SEVERE SEPSIS WITH SEPTIC SHOCK (CODE) (HCC): Status: RESOLVED | Noted: 2019-01-23 | Resolved: 2019-01-26

## 2019-01-26 PROBLEM — R06.03 RESPIRATORY DISTRESS: Status: RESOLVED | Noted: 2019-01-23 | Resolved: 2019-01-26

## 2019-01-26 PROBLEM — R63.8 INCREASED BMI: Chronic | Status: ACTIVE | Noted: 2017-09-14

## 2019-01-26 PROBLEM — D69.6 THROMBOCYTOPENIA (HCC): Status: ACTIVE | Noted: 2019-01-26

## 2019-01-26 PROBLEM — R60.0 BILATERAL LEG EDEMA: Chronic | Status: ACTIVE | Noted: 2018-06-11

## 2019-01-26 PROBLEM — N17.9 ACUTE RENAL FAILURE (ARF) (HCC): Status: RESOLVED | Noted: 2019-01-25 | Resolved: 2019-01-26

## 2019-01-26 PROBLEM — G93.40 ACUTE ENCEPHALOPATHY: Status: RESOLVED | Noted: 2019-01-23 | Resolved: 2019-01-26

## 2019-01-26 PROBLEM — M79.604 BILATERAL LEG PAIN: Status: RESOLVED | Noted: 2018-06-11 | Resolved: 2019-01-26

## 2019-01-26 PROBLEM — H25.9 AGE-RELATED CATARACT OF RIGHT EYE: Chronic | Status: ACTIVE | Noted: 2017-09-14

## 2019-01-26 LAB
ALBUMIN SERPL BCP-MCNC: 2.2 G/DL (ref 3.5–5)
ALP SERPL-CCNC: 79 U/L (ref 46–116)
ALT SERPL W P-5'-P-CCNC: 159 U/L (ref 12–78)
ANION GAP SERPL CALCULATED.3IONS-SCNC: 8 MMOL/L (ref 4–13)
ANION GAP SERPL CALCULATED.3IONS-SCNC: 9 MMOL/L (ref 4–13)
APTT PPP: 27 SECONDS (ref 26–38)
AST SERPL W P-5'-P-CCNC: 551 U/L (ref 5–45)
BACTERIA BLD CULT: ABNORMAL
BACTERIA BLD CULT: ABNORMAL
BASE EX.OXY STD BLDV CALC-SCNC: 88.5 % (ref 60–80)
BASE EX.OXY STD BLDV CALC-SCNC: 88.7 % (ref 60–80)
BASE EXCESS BLDA CALC-SCNC: -1.1 MMOL/L
BASE EXCESS BLDV CALC-SCNC: -0.5 MMOL/L
BASE EXCESS BLDV CALC-SCNC: 0 MMOL/L
BASOPHILS # BLD AUTO: 0.02 THOUSANDS/ΜL (ref 0–0.1)
BASOPHILS NFR BLD AUTO: 0 % (ref 0–1)
BILIRUB DIRECT SERPL-MCNC: 0.24 MG/DL (ref 0–0.2)
BILIRUB SERPL-MCNC: 0.56 MG/DL (ref 0.2–1)
BODY TEMPERATURE: 99.1 DEGREES FEHRENHEIT
BUN SERPL-MCNC: 32 MG/DL (ref 5–25)
BUN SERPL-MCNC: 34 MG/DL (ref 5–25)
BUN SERPL-MCNC: 35 MG/DL (ref 5–25)
BUN SERPL-MCNC: 36 MG/DL (ref 5–25)
CA-I BLD-SCNC: 1.11 MMOL/L (ref 1.12–1.32)
CA-I BLD-SCNC: 1.13 MMOL/L (ref 1.12–1.32)
CA-I BLD-SCNC: 1.14 MMOL/L (ref 1.12–1.32)
CA-I BLD-SCNC: 1.15 MMOL/L (ref 1.12–1.32)
CALCIUM SERPL-MCNC: 8.1 MG/DL (ref 8.3–10.1)
CALCIUM SERPL-MCNC: 8.2 MG/DL (ref 8.3–10.1)
CALCIUM SERPL-MCNC: 8.2 MG/DL (ref 8.3–10.1)
CALCIUM SERPL-MCNC: 8.4 MG/DL (ref 8.3–10.1)
CHLORIDE SERPL-SCNC: 105 MMOL/L (ref 100–108)
CHLORIDE SERPL-SCNC: 105 MMOL/L (ref 100–108)
CHLORIDE SERPL-SCNC: 107 MMOL/L (ref 100–108)
CHLORIDE SERPL-SCNC: 107 MMOL/L (ref 100–108)
CK MB SERPL-MCNC: 38.7 NG/ML (ref 0–5)
CK MB SERPL-MCNC: <1 % (ref 0–2.5)
CK SERPL-CCNC: ABNORMAL U/L (ref 39–308)
CO2 SERPL-SCNC: 21 MMOL/L (ref 21–32)
CO2 SERPL-SCNC: 22 MMOL/L (ref 21–32)
CO2 SERPL-SCNC: 23 MMOL/L (ref 21–32)
CO2 SERPL-SCNC: 23 MMOL/L (ref 21–32)
CREAT SERPL-MCNC: 2.68 MG/DL (ref 0.6–1.3)
CREAT SERPL-MCNC: 2.68 MG/DL (ref 0.6–1.3)
CREAT SERPL-MCNC: 2.88 MG/DL (ref 0.6–1.3)
CREAT SERPL-MCNC: 2.89 MG/DL (ref 0.6–1.3)
EOSINOPHIL # BLD AUTO: 0 THOUSAND/ΜL (ref 0–0.61)
EOSINOPHIL NFR BLD AUTO: 0 % (ref 0–6)
ERYTHROCYTE [DISTWIDTH] IN BLOOD BY AUTOMATED COUNT: 16.3 % (ref 11.6–15.1)
GFR SERPL CREATININE-BSD FRML MDRD: 23 ML/MIN/1.73SQ M
GFR SERPL CREATININE-BSD FRML MDRD: 23 ML/MIN/1.73SQ M
GFR SERPL CREATININE-BSD FRML MDRD: 25 ML/MIN/1.73SQ M
GFR SERPL CREATININE-BSD FRML MDRD: 25 ML/MIN/1.73SQ M
GLUCOSE SERPL-MCNC: 158 MG/DL (ref 65–140)
GLUCOSE SERPL-MCNC: 160 MG/DL (ref 65–140)
GLUCOSE SERPL-MCNC: 163 MG/DL (ref 65–140)
GLUCOSE SERPL-MCNC: 167 MG/DL (ref 65–140)
GLUCOSE SERPL-MCNC: 170 MG/DL (ref 65–140)
GLUCOSE SERPL-MCNC: 186 MG/DL (ref 65–140)
GLUCOSE SERPL-MCNC: 186 MG/DL (ref 65–140)
GLUCOSE SERPL-MCNC: 194 MG/DL (ref 65–140)
GRAM STN SPEC: ABNORMAL
GRAM STN SPEC: ABNORMAL
HCO3 BLDA-SCNC: 21.4 MMOL/L (ref 22–28)
HCO3 BLDV-SCNC: 23.5 MMOL/L (ref 24–30)
HCO3 BLDV-SCNC: 24.3 MMOL/L (ref 24–30)
HCT VFR BLD AUTO: 36.8 % (ref 36.5–49.3)
HGB BLD-MCNC: 11.8 G/DL (ref 12–17)
HOROWITZ INDEX BLDA+IHG-RTO: 40 MM[HG]
IMM GRANULOCYTES # BLD AUTO: 0.18 THOUSAND/UL (ref 0–0.2)
IMM GRANULOCYTES NFR BLD AUTO: 1 % (ref 0–2)
LYMPHOCYTES # BLD AUTO: 0.45 THOUSANDS/ΜL (ref 0.6–4.47)
LYMPHOCYTES NFR BLD AUTO: 3 % (ref 14–44)
MAGNESIUM SERPL-MCNC: 2 MG/DL (ref 1.6–2.6)
MAGNESIUM SERPL-MCNC: 2.1 MG/DL (ref 1.6–2.6)
MCH RBC QN AUTO: 25.9 PG (ref 26.8–34.3)
MCHC RBC AUTO-ENTMCNC: 32.1 G/DL (ref 31.4–37.4)
MCV RBC AUTO: 81 FL (ref 82–98)
MONOCYTES # BLD AUTO: 1.36 THOUSAND/ΜL (ref 0.17–1.22)
MONOCYTES NFR BLD AUTO: 10 % (ref 4–12)
NEUTROPHILS # BLD AUTO: 11.47 THOUSANDS/ΜL (ref 1.85–7.62)
NEUTS SEG NFR BLD AUTO: 86 % (ref 43–75)
NRBC BLD AUTO-RTO: 0 /100 WBCS
O2 CT BLDA-SCNC: 17.1 ML/DL (ref 16–23)
O2 CT BLDV-SCNC: 16.2 ML/DL
O2 CT BLDV-SCNC: 16.3 ML/DL
OXYHGB MFR BLDA: 94 % (ref 94–97)
PCO2 BLDA: 29.6 MM HG (ref 36–44)
PCO2 BLDV: 36.4 MM HG (ref 42–50)
PCO2 BLDV: 38.4 MM HG (ref 42–50)
PCO2 TEMP ADJ BLDA: 30 MM HG (ref 36–44)
PEEP RESPIRATORY: 5 CM[H2O]
PH BLD: 7.47 [PH] (ref 7.35–7.45)
PH BLDA: 7.48 [PH] (ref 7.35–7.45)
PH BLDV: 7.42 [PH] (ref 7.3–7.4)
PH BLDV: 7.43 [PH] (ref 7.3–7.4)
PHOSPHATE SERPL-MCNC: 2.2 MG/DL (ref 2.7–4.5)
PHOSPHATE SERPL-MCNC: 2.4 MG/DL (ref 2.7–4.5)
PHOSPHATE SERPL-MCNC: 2.4 MG/DL (ref 2.7–4.5)
PHOSPHATE SERPL-MCNC: 2.5 MG/DL (ref 2.7–4.5)
PLATELET # BLD AUTO: 46 THOUSANDS/UL (ref 149–390)
PO2 BLD: 73.2 MM HG (ref 75–129)
PO2 BLDA: 71.7 MM HG (ref 75–129)
PO2 BLDV: 59 MM HG (ref 35–45)
PO2 BLDV: 59.5 MM HG (ref 35–45)
POTASSIUM SERPL-SCNC: 3.9 MMOL/L (ref 3.5–5.3)
POTASSIUM SERPL-SCNC: 3.9 MMOL/L (ref 3.5–5.3)
POTASSIUM SERPL-SCNC: 4.1 MMOL/L (ref 3.5–5.3)
POTASSIUM SERPL-SCNC: 4.2 MMOL/L (ref 3.5–5.3)
PROT SERPL-MCNC: 5.7 G/DL (ref 6.4–8.2)
RBC # BLD AUTO: 4.55 MILLION/UL (ref 3.88–5.62)
SODIUM SERPL-SCNC: 136 MMOL/L (ref 136–145)
SODIUM SERPL-SCNC: 138 MMOL/L (ref 136–145)
SPECIMEN SOURCE: ABNORMAL
VENT AC: 16
VENT AC: 20
VENT AC: 20
VENT- AC: AC
VT SETTING VENT: 500 ML
WBC # BLD AUTO: 13.48 THOUSAND/UL (ref 4.31–10.16)

## 2019-01-26 PROCEDURE — 82805 BLOOD GASES W/O2 SATURATION: CPT | Performed by: NURSE PRACTITIONER

## 2019-01-26 PROCEDURE — 87147 CULTURE TYPE IMMUNOLOGIC: CPT | Performed by: INTERNAL MEDICINE

## 2019-01-26 PROCEDURE — 94003 VENT MGMT INPAT SUBQ DAY: CPT

## 2019-01-26 PROCEDURE — 85730 THROMBOPLASTIN TIME PARTIAL: CPT | Performed by: INTERNAL MEDICINE

## 2019-01-26 PROCEDURE — 90945 DIALYSIS ONE EVALUATION: CPT

## 2019-01-26 PROCEDURE — 71045 X-RAY EXAM CHEST 1 VIEW: CPT

## 2019-01-26 PROCEDURE — 99232 SBSQ HOSP IP/OBS MODERATE 35: CPT | Performed by: INTERNAL MEDICINE

## 2019-01-26 PROCEDURE — 82805 BLOOD GASES W/O2 SATURATION: CPT | Performed by: PHYSICIAN ASSISTANT

## 2019-01-26 PROCEDURE — 82330 ASSAY OF CALCIUM: CPT | Performed by: INTERNAL MEDICINE

## 2019-01-26 PROCEDURE — 87040 BLOOD CULTURE FOR BACTERIA: CPT | Performed by: INTERNAL MEDICINE

## 2019-01-26 PROCEDURE — 80048 BASIC METABOLIC PNL TOTAL CA: CPT | Performed by: INTERNAL MEDICINE

## 2019-01-26 PROCEDURE — 90945 DIALYSIS ONE EVALUATION: CPT | Performed by: INTERNAL MEDICINE

## 2019-01-26 PROCEDURE — 94760 N-INVAS EAR/PLS OXIMETRY 1: CPT

## 2019-01-26 PROCEDURE — 99291 CRITICAL CARE FIRST HOUR: CPT | Performed by: NURSE PRACTITIONER

## 2019-01-26 PROCEDURE — 82553 CREATINE MB FRACTION: CPT | Performed by: PHYSICIAN ASSISTANT

## 2019-01-26 PROCEDURE — 82948 REAGENT STRIP/BLOOD GLUCOSE: CPT

## 2019-01-26 PROCEDURE — 84100 ASSAY OF PHOSPHORUS: CPT | Performed by: INTERNAL MEDICINE

## 2019-01-26 PROCEDURE — 80076 HEPATIC FUNCTION PANEL: CPT | Performed by: PHYSICIAN ASSISTANT

## 2019-01-26 PROCEDURE — 85025 COMPLETE CBC W/AUTO DIFF WBC: CPT | Performed by: PHYSICIAN ASSISTANT

## 2019-01-26 PROCEDURE — 83735 ASSAY OF MAGNESIUM: CPT | Performed by: INTERNAL MEDICINE

## 2019-01-26 PROCEDURE — 82550 ASSAY OF CK (CPK): CPT | Performed by: PHYSICIAN ASSISTANT

## 2019-01-26 RX ORDER — POTASSIUM CHLORIDE 29.8 MG/ML
40 INJECTION INTRAVENOUS ONCE
Status: COMPLETED | OUTPATIENT
Start: 2019-01-26 | End: 2019-01-26

## 2019-01-26 RX ORDER — POLYETHYLENE GLYCOL 3350 17 G/17G
17 POWDER, FOR SOLUTION ORAL DAILY
Status: DISCONTINUED | OUTPATIENT
Start: 2019-01-26 | End: 2019-02-21

## 2019-01-26 RX ORDER — HEPARIN SODIUM 10000 [USP'U]/100ML
1000 INJECTION, SOLUTION INTRAVENOUS CONTINUOUS
Status: DISCONTINUED | OUTPATIENT
Start: 2019-01-26 | End: 2019-01-29

## 2019-01-26 RX ADMIN — DEXMEDETOMIDINE 0.7 MCG/KG/HR: 100 INJECTION, SOLUTION, CONCENTRATE INTRAVENOUS at 17:20

## 2019-01-26 RX ADMIN — CEFAZOLIN SODIUM 2000 MG: 2 SOLUTION INTRAVENOUS at 20:00

## 2019-01-26 RX ADMIN — INSULIN LISPRO 5 UNITS: 100 INJECTION, SOLUTION INTRAVENOUS; SUBCUTANEOUS at 12:55

## 2019-01-26 RX ADMIN — HYDROCORTISONE SODIUM SUCCINATE 50 MG: 100 INJECTION, POWDER, FOR SOLUTION INTRAMUSCULAR; INTRAVENOUS at 11:58

## 2019-01-26 RX ADMIN — DEXMEDETOMIDINE 0.7 MCG/KG/HR: 100 INJECTION, SOLUTION, CONCENTRATE INTRAVENOUS at 12:54

## 2019-01-26 RX ADMIN — FENTANYL CITRATE 50 MCG/HR: 50 INJECTION, SOLUTION INTRAMUSCULAR; INTRAVENOUS at 09:11

## 2019-01-26 RX ADMIN — POLYETHYLENE GLYCOL 3350 17 G: 17 POWDER, FOR SOLUTION ORAL at 11:58

## 2019-01-26 RX ADMIN — FENTANYL CITRATE 50 MCG: 50 INJECTION, SOLUTION INTRAMUSCULAR; INTRAVENOUS at 23:12

## 2019-01-26 RX ADMIN — DEXMEDETOMIDINE 0.7 MCG/KG/HR: 100 INJECTION, SOLUTION, CONCENTRATE INTRAVENOUS at 02:52

## 2019-01-26 RX ADMIN — POTASSIUM CHLORIDE 40 MEQ: 400 INJECTION, SOLUTION INTRAVENOUS at 15:00

## 2019-01-26 RX ADMIN — CALCIUM GLUCONATE 1 G: 98 INJECTION, SOLUTION INTRAVENOUS at 21:00

## 2019-01-26 RX ADMIN — INSULIN LISPRO 5 UNITS: 100 INJECTION, SOLUTION INTRAVENOUS; SUBCUTANEOUS at 17:31

## 2019-01-26 RX ADMIN — DEXMEDETOMIDINE 0.7 MCG/KG/HR: 100 INJECTION, SOLUTION, CONCENTRATE INTRAVENOUS at 07:51

## 2019-01-26 RX ADMIN — CEFAZOLIN SODIUM 2000 MG: 2 SOLUTION INTRAVENOUS at 07:34

## 2019-01-26 RX ADMIN — FENTANYL CITRATE 50 MCG: 50 INJECTION, SOLUTION INTRAMUSCULAR; INTRAVENOUS at 07:56

## 2019-01-26 RX ADMIN — FENTANYL CITRATE 50 MCG: 50 INJECTION, SOLUTION INTRAMUSCULAR; INTRAVENOUS at 08:47

## 2019-01-26 RX ADMIN — SODIUM PHOSPHATE, MONOBASIC, MONOHYDRATE 6 MMOL: 276; 142 INJECTION, SOLUTION INTRAVENOUS at 01:54

## 2019-01-26 RX ADMIN — NYSTATIN: 100000 POWDER TOPICAL at 09:15

## 2019-01-26 RX ADMIN — SODIUM PHOSPHATE, MONOBASIC, MONOHYDRATE 6 MMOL: 276; 142 INJECTION, SOLUTION INTRAVENOUS at 21:00

## 2019-01-26 RX ADMIN — CALCIUM GLUCONATE 1 G: 98 INJECTION, SOLUTION INTRAVENOUS at 15:49

## 2019-01-26 RX ADMIN — CHLORHEXIDINE GLUCONATE 0.12% ORAL RINSE 15 ML: 1.2 LIQUID ORAL at 20:00

## 2019-01-26 RX ADMIN — DEXMEDETOMIDINE 0.7 MCG/KG/HR: 100 INJECTION, SOLUTION, CONCENTRATE INTRAVENOUS at 23:45

## 2019-01-26 RX ADMIN — DEXMEDETOMIDINE 0.7 MCG/KG/HR: 100 INJECTION, SOLUTION, CONCENTRATE INTRAVENOUS at 19:12

## 2019-01-26 RX ADMIN — Medication 20000 ML: at 19:44

## 2019-01-26 RX ADMIN — MILRINONE LACTATE IN DEXTROSE 0.25 MCG/KG/MIN: 200 INJECTION, SOLUTION INTRAVENOUS at 06:23

## 2019-01-26 RX ADMIN — DEXMEDETOMIDINE 0.7 MCG/KG/HR: 100 INJECTION, SOLUTION, CONCENTRATE INTRAVENOUS at 10:19

## 2019-01-26 RX ADMIN — FAMOTIDINE 20 MG: 40 POWDER, FOR SUSPENSION ORAL at 09:15

## 2019-01-26 RX ADMIN — DEXMEDETOMIDINE 0.7 MCG/KG/HR: 100 INJECTION, SOLUTION, CONCENTRATE INTRAVENOUS at 00:15

## 2019-01-26 RX ADMIN — CALCIUM GLUCONATE 1 G: 98 INJECTION, SOLUTION INTRAVENOUS at 01:54

## 2019-01-26 RX ADMIN — NYSTATIN: 100000 POWDER TOPICAL at 17:30

## 2019-01-26 RX ADMIN — NOREPINEPHRINE BITARTRATE 11 MCG/MIN: 1 INJECTION INTRAVENOUS at 10:47

## 2019-01-26 RX ADMIN — CHLORHEXIDINE GLUCONATE 0.12% ORAL RINSE 15 ML: 1.2 LIQUID ORAL at 08:41

## 2019-01-26 RX ADMIN — MILRINONE LACTATE IN DEXTROSE 0.25 MCG/KG/MIN: 200 INJECTION, SOLUTION INTRAVENOUS at 19:19

## 2019-01-26 RX ADMIN — ALTEPLASE 2 MG: 2.2 INJECTION, POWDER, LYOPHILIZED, FOR SOLUTION INTRAVENOUS at 13:09

## 2019-01-26 RX ADMIN — DEXMEDETOMIDINE 0.7 MCG/KG/HR: 100 INJECTION, SOLUTION, CONCENTRATE INTRAVENOUS at 15:40

## 2019-01-26 RX ADMIN — HYDROCORTISONE SODIUM SUCCINATE 50 MG: 100 INJECTION, POWDER, FOR SOLUTION INTRAMUSCULAR; INTRAVENOUS at 05:21

## 2019-01-26 RX ADMIN — Medication 20000 ML: at 03:36

## 2019-01-26 RX ADMIN — HYDROCORTISONE SODIUM SUCCINATE 50 MG: 100 INJECTION, POWDER, FOR SOLUTION INTRAMUSCULAR; INTRAVENOUS at 17:30

## 2019-01-26 RX ADMIN — MILRINONE LACTATE IN DEXTROSE 0.25 MCG/KG/MIN: 200 INJECTION, SOLUTION INTRAVENOUS at 15:45

## 2019-01-26 RX ADMIN — CALCIUM GLUCONATE 1 G: 98 INJECTION, SOLUTION INTRAVENOUS at 07:46

## 2019-01-26 RX ADMIN — DEXMEDETOMIDINE 0.7 MCG/KG/HR: 100 INJECTION, SOLUTION, CONCENTRATE INTRAVENOUS at 05:23

## 2019-01-26 RX ADMIN — SODIUM PHOSPHATE, MONOBASIC, MONOHYDRATE 6 MMOL: 276; 142 INJECTION, SOLUTION INTRAVENOUS at 08:42

## 2019-01-26 RX ADMIN — Medication 20000 ML: at 10:48

## 2019-01-26 RX ADMIN — INSULIN LISPRO 5 UNITS: 100 INJECTION, SOLUTION INTRAVENOUS; SUBCUTANEOUS at 05:21

## 2019-01-26 RX ADMIN — HEPARIN SODIUM AND DEXTROSE 300 UNITS/HR: 10000; 5 INJECTION INTRAVENOUS at 14:19

## 2019-01-26 RX ADMIN — DEXMEDETOMIDINE 0.7 MCG/KG/HR: 100 INJECTION, SOLUTION, CONCENTRATE INTRAVENOUS at 21:16

## 2019-01-26 RX ADMIN — HYDROCORTISONE SODIUM SUCCINATE 50 MG: 100 INJECTION, POWDER, FOR SOLUTION INTRAMUSCULAR; INTRAVENOUS at 23:53

## 2019-01-26 RX ADMIN — SODIUM PHOSPHATE, MONOBASIC, MONOHYDRATE 6 MMOL: 276; 142 INJECTION, SOLUTION INTRAVENOUS at 15:50

## 2019-01-26 RX ADMIN — VASOPRESSIN 0.04 UNITS/MIN: 20 INJECTION INTRAVENOUS at 19:20

## 2019-01-26 RX ADMIN — POTASSIUM CHLORIDE 40 MEQ: 400 INJECTION, SOLUTION INTRAVENOUS at 01:54

## 2019-01-26 NOTE — PROGRESS NOTES
Renal addendum:    CRRT filter clotting again  Will start patient on heparin pre filter with CRR T  Will give 500 units of heparin bolus followed by 300 units/hour continuous infusion pre filter  Continue to monitor APTT every 6 hr with goal APTT around 45  Noted progressive worsening thrombocytopenia  Discussed with Margaret Amado from ICU team and okay to start heparin with CRRT

## 2019-01-26 NOTE — PROGRESS NOTES
Progress Note - Critical Care   Yulissa Mayers 60 y o  male MRN: [id number]  [facility] Encounter: [id number]    Attending Physician: Jose Sprague, DO      ______________________________________________________________________  Assessment and Plan:   Principal Problem:    [facility]  Active Problems:    Increased BMI    NSTEMI (non-ST elevated myocardial infarction) ([facility] 75 )    PATRICK (acute kidney injury) ([place] [address] )    Stress-induced cardiomyopathy    Acute respiratory failure with hypoxia ([place] [address] )    History of aortic valve replacement with bioprosthetic valve    Rhabdomyolysis    Atrial fibrillation (HCC)    Septic shock (HCC)    Gram-positive bacteremia    Transaminitis    Thrombocytopenia (HCC)  Resolved Problems:    Acute encephalopathy    Neuro:   · Continue Precedex drip 0 7  · Goal RASS 0 to -1 currently 0  · CAM ICU, sleep hygiene, delirium precautions  · P r n   Fentanyl x4 doses in the last 24 hours    · CV:  Shock septic/cardiogenic/acute systolic CHF with acute on chronic diastolic CHF/ PFO/AFib/NSTEMI/bioprosthetic AVR 07/2014/left bundle branch blockTee 1/25 with an EF of 40% hypertrophy, diffuse hypokinesis, mild RV decreased function, small PFO, no vegetation  · Troponin peaked greater than 40, no EKG changes, likely will need eventual ischemic evaluation  · Milrinone added 1/24 VBG SVO2 increased from 52-84 than 74  · Maintain a line day 1/triple-lumen catheter day 2  · Wean Levophed for goal map greater than 65, continue vasopressin if increasing pressor requirements would increase vasopressin back to 0 04  · Continue stress dose steroids  · AFib is not rate controlled however patient is clinically volume overloaded on pressors will attempt -25 with advancement to -50 cc and monitor vasopressor requirements, goal would be -100 cc given patient is grossly overloaded and see if that improves with rate control if not may need to consider amiodarone with transaminitis improving    Pulm: Acute hypoxic respiratory failure  · Maintain mechanical ventilation  · Attempt pressure support ventilation with no plans for extubation due to grossly volume overloaded/pressor requirements  · VAP bundle  · Oxygen for saturations greater than 92%  · ET tube day 3    GI:  Transaminitis  · Improving likely secondary to shock liver  · No acute pathology on CT of the abdomen  · Patted function panel in the a m     :  A KI on CRT/rhabdo  · Nephrology following  · CRT changed to CVVH from CVVHD secondary to clotting yesterday  · Filter down x1 this a m  Due to clot  · If continues consider heparin given negative workup and potentially consumptive with CRT  · Attempt negative fluid balance given gross volume overload  · CRT protocol  · Daily CKs trending down unclear cause  · DC Turner catheter bladder scans daily    F/E/N:   · Advance tube feeds to goal  · Continue Pepcid  · Escalate bowel regimen    ID:  MSSA bacteremia persistent  · Id following  · Neeraj negative for vegetation yesterday  · First 2 sets of blood cultures 4/4 positive for MSSA  · Third set of repeat blood cultures pending  · Procalcitonin 369-3  down trending repeat in a m  · Continue Ancef day 3 antibiotic day 4 unknown length at this time    Heme:  Thrombocytopenia  · Hemolysis smear negative, fibrinogen 709  · Likely consumptive with CRT  · Consider resuming DVT prophylaxis    Endo:  Hyperglycemia on steroids  · A1c 5 8  · Continue sliding scale insulin     Msk/Skin:  T/P q 2 hours PT    Disposition:  Maintain medical critical care, attempt volume removal with CRT consider pressure support ventilation with no plans for extubation given volume overload/pressor requirements at this time    Code Status: Level 1 - Full Code    Counseling / Coordination of Care  Total Critical Care time spent 45 minutes excluding procedures, teaching and family updates      ______________________________________________________________________    Chief Complaint: Patient denies complaints of pain/chest pain  Shakes head yes when asked if feeling some shortness of breath  Unable to expand given intubated status    24 Hour Events:  Filter went down patient change from CVVHD to CVVH  This a m  Filter with clotting x1  Levophed titrated down to 7 and vasopressin cut in half to 0 02    Review of Systems   Unable to perform ROS: Intubated     ______________________________________________________________________    Physical Exam:     Physical Exam   Constitutional: He is intubated and restrained  HENT:   Head: Normocephalic and atraumatic  Mouth/Throat: Oropharynx is clear and moist    Eyes: Pupils are equal, round, and reactive to light  No scleral icterus  Neck: Neck supple  JVD present  Cardiovascular: Normal heart sounds and intact distal pulses  An irregular rhythm present  Tachycardia present  Exam reveals no gallop and no friction rub  No murmur heard  Pulmonary/Chest: Effort normal  Tachypnea noted  He is intubated  No respiratory distress  He has decreased breath sounds  He has no wheezes  He has no rales  Abdominal: Soft  He exhibits no distension  Bowel sounds are decreased  There is no tenderness  obese   Musculoskeletal: He exhibits edema  ue 3/5 le 2/5 strength   Neurological: GCS eye subscore is 4  GCS verbal subscore is 1  GCS motor subscore is 6  Skin: Skin is warm and dry  No rash noted  No erythema  No pallor       ______________________________________________________________________  Vitals:    19 0557 19 0600 19 0700 19 0746   BP:       BP Location:       Pulse: (!) 108 (!) 110 (!) 116    Resp: (!) 24 (!) 24 (!) 25    Temp: 99 °F (37 2 °C) 99 °F (37 2 °C) 98 6 °F (37 °C)    TempSrc:       SpO2: 95% 95% 94% 95%   Weight:       Height:           Temperature:   Temp (24hrs), Av 6 °F (37 °C), Min:97 9 °F (36 6 °C), Max:99 3 °F (37 4 °C)    Current Temperature: 98 6 °F (37 °C)  Weights:   IBW: 70 7 kg    Body mass index is 47 14 kg/m²  Weight (last 2 days)     Date/Time   Weight    01/24/19 1530  (!)  145 (319 23)            Hemodynamic Monitoring:  N/A     Non-Invasive/Invasive Ventilation Settings:  Respiratory    Lab Data (Last 4 hours)      01/26 0515            pH, Arterial       (!)7 477           pCO2, Arterial       (!)29 6           pO2, Arterial       (!)71 7           HCO3, Arterial       (!)21 4           Base Excess, Arterial       -1 1                O2/Vent Data       01/26 0502   Most Recent         Vent Mode AC/VC  AC/VC      Resp Rate (BPM) (BPM) 20  20      Vt (mL) (mL) 500  500      FIO2 (%) (%) 40  40      PEEP (cmH2O) (cmH2O) 5  5      MV 12 2  13 6                Lab Results   Component Value Date    PHART 7 477 (H) 01/26/2019    TYA3SQU 29 6 (LL) 01/26/2019    PO2ART 71 7 (L) 01/26/2019    KQQ8LUI 21 4 (L) 01/26/2019    BEART -1 1 01/26/2019    SOURCE Line, Arterial 01/26/2019     SpO2: SpO2: 95 %  Intake and Outputs:  I/O       01/24 0701 - 01/25 0700 01/25 0701 - 01/26 0700 01/26 0701 - 01/27 0700    I V  (mL/kg) 2920 2 (20 1) 2135 6 (14 7)     NG/GT 50 25     IV Piggyback 1100 3 928 9     Feedings  266     Total Intake(mL/kg) 4070 5 (28 1) 3355 5 (23 1)     Urine (mL/kg/hr) 40 20 (0)     Other 1720 3359     Total Output 1760 3379      Net +2310 5 -23 5                   Nutrition:        Diet Orders            Start     Ordered    01/25/19 1139  Diet Enteral/Parenteral; Tube Feeding No Oral Diet; Nepro; Continuous; 20  Diet effective now     Question Answer Comment   Diet Type Enteral/Parenteral    Enteral/Parenteral Tube Feeding No Oral Diet    Tube Feeding Formula: Nepro    Bolus/Cyclic/Continuous Continuous    Tube Feeding Goal Rate (mL/hr): 20    RD to adjust diet per protocol?  Yes        01/25/19 1138          Labs:     Results from last 7 days  Lab Units 01/26/19  0515 01/25/19  0410 01/24/19  1613 01/24/19  0502 01/23/19  2303 01/23/19  1600   WBC Thousand/uL 13 48* 11 12* 12 29* 12 10* 12 27* 16 01*   HEMOGLOBIN g/dL 11 8* 13 5 13 8 13 3 14 4 14 6   HEMATOCRIT % 36 8 42 5 44 2 43 4 46 4 47 0   PLATELETS Thousands/uL 46* 57* 64* 81* 83* 112*   NEUTROS PCT % 86* 86*  --   --   --  94*   MONOS PCT % 10 9  --   --   --  3*       Results from last 7 days  Lab Units 01/26/19  0601 01/26/19  0000 01/25/19  1801 01/25/19  1222 01/25/19  0410 01/24/19  1613 01/24/19  0502  01/23/19  1600   SODIUM mmol/L 136 136 136 138 136 138 142  < > 138   POTASSIUM mmol/L 4 2 3 9 4 7 3 9 3 7 3 9 3 7  < > 3 9   CHLORIDE mmol/L 107 105 106 105 104 107 106  < > 99*   CO2 mmol/L 21 23 21 20* 20* 17* 22  < > 25   ANION GAP mmol/L 8 8 9 13 12 14* 14*  < > 14*   BUN mg/dL 36* 34* 39* 40* 45* 52* 45*  < > 34*   CREATININE mg/dL 2 89* 2 88* 3 14* 3 55* 3 64* 4 03* 3 70*  < > 2 63*   CALCIUM mg/dL 8 2* 8 1* 8 2* 7 9* 7 5* 6 6* 7 0*  < > 8 0*   ALT U/L 159*  --   --   --  323* 344*  --   --  90*   AST U/L 551*  --   --   --  1,114* 1,513*  --   --  242*   ALK PHOS U/L 79  --   --   --  80 73  --   --  89   ALBUMIN g/dL 2 2*  --   --   --  2 5* 2 4*  --   --  3 3*   TOTAL BILIRUBIN mg/dL 0 56  --   --   --  0 71 0 73  --   --  1 40*   < > = values in this interval not displayed      Results from last 7 days  Lab Units 01/26/19  0601 01/26/19  0000 01/25/19  1801 01/25/19  1222 01/25/19  0410 01/24/19  1613 01/24/19  0502   MAGNESIUM mg/dL 2 1 2 0 2 1 2 1 2 0 2 2 2 0   PHOSPHORUS mg/dL 2 4* 2 5* 2 3* 2 9 3 1 4 6* 4 4        Results from last 7 days  Lab Units 01/25/19  0410 01/24/19  1613 01/24/19  0753 01/24/19  0041 01/23/19  1600   INR  0 94 1 04  --   --  1 45*   PTT seconds  --  46* 51* 48* 34       Results from last 7 days  Lab Units 01/24/19  2137 01/24/19  1831 01/24/19  1613 01/24/19  0502 01/24/19  0207 01/23/19  2251 01/23/19  1850   TROPONIN I ng/mL 36 20* 36 30* 34 90* >40 00* >40 00* 36 61* 24 29*       Results from last 7 days  Lab Units 01/24/19  1613 01/24/19  0502 01/24/19  0132 01/23/19  2251 01/23/19  1850 01/23/19  1600   LACTIC ACID mmol/L 1 3 2 5* 2 2* 2 2* 3 3* 5 5*     ABG:  Lab Results   Component Value Date    PHART 7 477 (H) 01/26/2019    XOP5TWM 29 6 (LL) 01/26/2019    PO2ART 71 7 (L) 01/26/2019    AJH5TED 21 4 (L) 01/26/2019    BEART -1 1 01/26/2019    SOURCE Line, Arterial 01/26/2019     VBG:  Results from last 7 days  Lab Units 01/26/19  0515  01/25/19  0048   PH IVONNE   --   --  7 393   PCO2 IVONNE mm Hg  --   --  32 8*   PO2 IVONNE mm Hg  --   --  40 4   HCO3 IVONNE mmol/L  --   --  19 5*   BASE EXC IVONNE mmol/L  --   --  -4 4   ABG SOURCE  Line, Arterial  < >  --    < > = values in this interval not displayed  Results from last 7 days  Lab Units 01/25/19  0410 01/24/19  1613 01/24/19  0335   PROCALCITONIN ng/ml 255 40* 340 67* 369 91*     No results found for: [facility]   Imaging:  I have personally reviewed pertinent reports  and I have personally reviewed pertinent films in PACS  EKG: tele reviewed    Micro:    Results from last 7 days  Lab Units 01/24/19  1255 01/24/19  1008 01/23/19  2301 01/23/19  1850 01/23/19  1730 01/23/19  1654 01/23/19  1600   BLOOD CULTURE  Staphylococcus aureus*  Staphylococcus aureus*  --   --   --   --  Staphylococcus aureus* Staphylococcus aureus*   SPUTUM CULTURE   --  Culture too young- will reincubate  --   --   --   --   --    GRAM STAIN RESULT  Gram positive cocci in clusters  Gram positive cocci in clusters No Polys or Bacteria seen  --   --   --  Gram positive cocci in clusters Gram positive cocci in clusters   URINE CULTURE   --   --   --   --  8007-7836 cfu/ml Gram Positive Cocci*  --   --    INFLUENZA B PCR   --   --   --  None Detected  --   --   --    RSV PCR   --   --   --  None Detected  --   --   --    LEGIONELLA URINARY ANTIGEN   --   --  Negative  --   --   --   --    STREP PNEUMONIAE ANTIGEN, URINE   --   --  Negative  --   --   --   --      Allergies:    Allergies   Allergen Reactions    Penicillins Rash     However, has subsequently tolerated Cefazolin and Cefepime     Medications:   Scheduled Meds:    Current Facility-Administered Medications:  acetaminophen 650 mg Oral Q6H PRN Randy Sidhu MD    albuterol 2 5 mg Nebulization Q4H PRN HERBERT Estes    bisacodyl 10 mg Rectal Daily PRN HERBERT Estes    calcium gluconate 1 G  100 mL IVPB 1 g Intravenous Once Trevin Roy MD Last Rate: 1 g (01/26/19 0709)   cefazolin 2,000 mg Intravenous Q12H Tony Mejia PA-C Last Rate: 2,000 mg (01/26/19 0744)   chlorhexidine 15 mL Swish [employer] Spit Q12H [address] HERBERT Estes    dexmedetomidine 0 1-0 7 mcg/kg/hr Intravenous Titrated Randy Sidhu MD Last Rate: 0 7 mcg/kg/hr (01/26/19 5072)   famotidine 20 mg Oral Daily Tony Mejia PA-C    fentanyl citrate (PF) 50 mcg Intravenous Q1H PRN Randy Sidhu MD    hydrocortisone sodium succinate 50 mg Intravenous Q6H [address] Yrn Santos DO    insulin lispro 5-25 Units Subcutaneous Q6H [address] Priyank Ellington PA-C    [facility] infusion 0 25 mcg/kg/min Intravenous Continuous Randy Sidhu MD Last Rate: 0 25 mcg/kg/min (01/26/19 0508)   norepinephrine 1-30 mcg/min Intravenous Titrated HERBERT Estes Last Rate: 8 mcg/min (01/26/19 0558)   NxStage K 4/Ca 3 20,000 mL Dialysis Continuous Ismael Cain MD Last Rate: 0 mL (01/25/19 6460)   nystatin  Topical BID HERBERT Estes    senna 8 8 mg Oral HS Priyank Ellington PA-C    sodium phosphate 6 mmol Intravenous Once Trevin Roy MD    vasopressin (PITRESSIN) in 0 9 % sodium chloride 100 mL 0 02 Units/min Intravenous Continuous Randy Sidhu MD Last Rate: 0 02 Units/min (01/25/19 0681)     Continuous Infusions:    dexmedetomidine 0 1-0 7 mcg/kg/hr Last Rate: 0 7 mcg/kg/hr (01/26/19 0751)   milrinone (PRIMACOR) infusion 0 25 mcg/kg/min Last Rate: 0 25 mcg/kg/min (01/26/19 0697)   norepinephrine 1-30 mcg/min Last Rate: 8 mcg/min (01/26/19 8276)   NxStage K 4/Ca 3 20,000 mL Last Rate: 0 mL (01/25/19 6305)   vasopressin (PITRESSIN) in 0 9 % sodium chloride 100 mL 0 02 Units/min Last Rate: 0 02 Units/min (01/25/19 0807)     PRN Meds:    acetaminophen 650 mg Q6H PRN   albuterol 2 5 mg Q4H PRN   bisacodyl 10 mg Daily PRN   fentanyl citrate (PF) 50 mcg Q1H PRN     VTE Pharmacologic Prophylaxis: None secondary to thrombocytopenia/shock liver  VTE Mechanical Prophylaxis: sequential compression device  Invasive lines and devices: Invasive Devices     Central Venous Catheter Line            CVC Central Lines 01/24/19 Triple Left Internal jugular 1 day          Peripheral Intravenous Line            Peripheral IV 01/23/19 Left Antecubital 2 days    Peripheral IV 01/23/19 Right Antecubital 2 days    Peripheral IV 01/24/19 Right Hand 2 days          Arterial Line            Arterial Line 01/24/19 Right Radial 1 day          Line            Temporary HD Catheter 1 day          Drain            NG/OG/Enteral Tube Orogastric 14 Fr Right mouth 2 days    Urethral Catheter Latex 2 days          Airway            ETT  Cuffed 7 5 mm 2 days                     Portions of the record may have been created with voice recognition software  Occasional wrong word or "sound a like" substitutions may have occurred due to the inherent limitations of voice recognition software  Read the chart carefully and recognize, using context, where substitutions have occurred      KATHY Bates

## 2019-01-26 NOTE — RESPIRATORY THERAPY NOTE
RT Ventilator Management Note  Gisela Leary 60 y o  male MRN: [id number]  Unit/Bed#: [facility] 11 Encounter: [id number]      Daily Screen       1/25/2019 0915 1/26/2019 0746          Patient safety screen outcome[de-identified] Failed -      Not Ready for Weaning due to[de-identified] Underline problem not resolved Underline problem not resolved              Physical Exam:   Assessment Type: Assess only  General Appearance: Awake, Alert  Respiratory Pattern: Assisted  Chest Assessment: Chest expansion symmetrical  Bilateral Breath Sounds: Diminished  R Breath Sounds: Diminished, Clear  L Breath Sounds: Diminished, Clear  Suction: ET Tube  O2 Device: vent      Resp Comments: pt cont to blanca current AC settings no distress noted no changes at this time will cont to monitor

## 2019-01-26 NOTE — PROGRESS NOTES
Cardiology Progress Note - Riley Espinoza 60 y o  male MRN: [id number]    [facility]: [id number]      Assessment:  Principal Problem:    Cardiogenic shock ([facility] )  Active Problems:    Increased BMI    NSTEMI (non-ST elevated myocardial infarction) ([facility] )    PATRICK (acute kidney injury) ([place] [address] )    Stress-induced cardiomyopathy    Acute respiratory failure with hypoxia ([place] [address] )    History of aortic valve replacement with bioprosthetic valve    Rhabdomyolysis    Atrial fibrillation (HCC)    Septic shock (HCC)    Gram-positive bacteremia    Transaminitis    Thrombocytopenia ([place] [address] )      Plan:  Patient continues on a ventilator  Transesophageal echocardiogram done Friday was negative for vegetation  He is receiving full supportive therapy  Decline in ejection fraction felt to be likely related to sepsis given prior documentation of normal coronary anatomy at the time of cardiac catheterization 2014  Potassium today is 4 2 with a creatinine of 2 89  I agree with his current medical regimen  Subjective:   Patient seen and examined  No significant events overnight   negative  Objective:     Vitals: Blood pressure 124/72, pulse (!) 108, temperature 99 °F (37 2 °C), resp  rate 22, height 5' 9" (1 753 m), weight (!) 145 kg (319 lb 3 6 oz), SpO2 95 %  , Body mass index is 47 14 kg/m² , Orthostatic Blood Pressures      Most Recent Value   Blood Pressure  124/72 filed at 01/24/2019 1530   Patient Position - Orthostatic VS  Lying filed at 01/24/2019 1530      ,      Intake/Output Summary (Last 24 hours) at 01/26/19 1014  Last data filed at 01/26/19 1001   Gross per 24 hour   Intake          3501 81 ml   Output             3049 ml   Net           452 81 ml       No significant arrhythmias seen on telemetry review         Physical Exam:    GEN: Riley Espinoza    NECK: supple, no carotid bruits, no JVD or HJR  HEART: normal rate, regular rhythm, normal S1 and S2, no murmurs, clicks, gallops or rubs   LUNGS: clear to auscultation bilaterally; no wheezes, rales, or rhonchi   ABDOMEN: normal bowel sounds, soft, no tenderness, no distention  EXTREMITIES:edema    Labs & Results:    Admission on 01/24/2019   No results displayed because visit has over 200 results  Ct Chest Abdomen Pelvis Wo Contrast    Result Date: 1/23/2019  Narrative: CT CHEST, ABDOMEN AND PELVIS WITHOUT IV CONTRAST INDICATION:   Sepsis  COMPARISON:  None  TECHNIQUE: CT examination of the chest, abdomen and pelvis was performed without intravenous contrast   Axial, sagittal, and coronal 2D reformatted images were created from the source data and submitted for interpretation  Radiation dose length product (DLP) for this visit:  2085 mGy-cm   This examination, like all CT scans performed in the [facility], was performed utilizing techniques to minimize radiation dose exposure, including the use of iterative reconstruction and automated exposure control  Enteric contrast was administered  FINDINGS: CHEST LUNGS:  Patchy consolidative changes seen at the bilateral lung bases, consistent with atelectasis although infiltrate would have to be excluded clinically  Additional segmental atelectasis along the right major fissure     There is no tracheal or endobronchial lesion  PLEURA:  Unremarkable  HEART/GREAT VESSELS:  Atherosclerotic changes are noted in thoracic aorta noted  Status post valve replacement  MEDIASTINUM AND JAMAR:  Unremarkable  CHEST WALL AND LOWER NECK:   Unremarkable  ABDOMEN LIVER/BILIARY TREE:  Indeterminate 3 3 cm hypodensity in the left hepatic lobe  Additional vague 1 6 cm hypodense focus more inferiorly within the left hepatic lobe  GALLBLADDER:  No calcified gallstones  No pericholecystic inflammatory change  SPLEEN:  Unremarkable  PANCREAS:  Unremarkable  ADRENAL GLANDS:  Unremarkable  KIDNEYS/URETERS:  One or more simple renal cyst(s) is noted  Otherwise unremarkable kidneys  No hydronephrosis   STOMACH AND BOWEL: Unremarkable  APPENDIX:  No findings to suggest appendicitis  ABDOMINOPELVIC CAVITY:  No ascites or free intraperitoneal air  No lymphadenopathy  VESSELS:  Atherosclerotic changes are present  No evidence of aneurysm  PELVIS REPRODUCTIVE ORGANS:  Unremarkable for patient's age  URINARY BLADDER:  Turner catheter is in place, which limits evaluation    ABDOMINAL WALL/INGUINAL REGIONS:  Unremarkable  OSSEOUS STRUCTURES:  No acute fracture or destructive osseous lesion  Impression: Patchy consolidative changes at the bilateral lung bases, likely secondary to atelectasis, or alternatively, in the appropriate clinical context, acute infiltrate, possibly secondary to aspiration Workstation performed: [id number]     Xr Chest Portable    Result Date: 1/25/2019  Narrative: CHEST INDICATION:   ETT check  COMPARISON:  January 24, 2019  EXAM PERFORMED/VIEWS:  XR CHEST PORTABLE FINDINGS:  Endotracheal tube is present, in satisfactory position with its tip above the level of the raad  Enteric tube is present with its tip extending below the left hemidiaphragm  Right and left internal jugular central venous catheters are noted  Sternotomy wires are present  Arch is mildly enlarged  Prosthetic cardiac valve is noted  Mild pulmonary vascular congestive changes are seen  No significant pleural effusion  No pneumothorax  No acute osseous abnormality  Impression: Expected positioning of endotracheal and enteric tubes  A right internal jugular central venous catheter has been changed since previous examination with a new one being larger in caliber  No pneumothorax  Workstation performed: [id number]     Xr Chest Portable    Result Date: 1/24/2019  Narrative: CHEST INDICATION:   Line placement  COMPARISON:  1/24/2019  EXAM PERFORMED/VIEWS:  XR CHEST PORTABLE FINDINGS:  Right IJ with tip inferior aspect of the jugular vein--unchanged    Soft tissue capping at the right apex is smooth and was present before right IJ placement  New left IJ catheter tip is positioned appropriately with tip at the cavoatrial junction  Unchanged endotracheal tube, intact median sternotomy wires, and aortic valvular replacement  Unchanged enteric tube with side-port at the distal esophagus  Unchanged cardiomegaly and pulmonary vascular congestion  Platelike atelectasis in the left midlung and base  No new consolidations  Bones are unremarkable  Impression: 1  Appropriately positioned left IJ catheter  No pneumothorax on either side  2   Otherwise, unchanged lines and tubes  3   Persistent pulmonary vascular congestion and atelectasis in the lingula and left lower lobe  Workstation performed: [id number]     Xr Chest Portable    Result Date: 1/24/2019  Narrative: CHEST INDICATION:   line placement  COMPARISON:  1/24/2019 EXAM PERFORMED/VIEWS:  XR CHEST PORTABLE     Impression: FINDINGS/IMPRESSION: 1  Right IJ catheter tip is superficial in location projecting over the region of the inferior right jugular vein  Recommend replacement  2   Appropriately positioned endotracheal tube  3   Unchanged enteric tube with side-port at the distal esophagus  4   Unchanged low-grade alveolar edema and left midlung platelike atelectasis  5   Unchanged heart and megaly and pulmonary vascular congestion  The study was marked in EPIC for significant notification  Workstation performed: [id number]     Xr Chest Portable    Result Date: 1/24/2019  Narrative: CHEST INDICATION:   pneumothorax r/o  COMPARISON:  1/23/2019 EXAM PERFORMED/VIEWS:  XR CHEST PORTABLE FINDINGS:  Endotracheal tube tip appears to have been slightly retracted, measuring 5 6 cm above the raad  The tip is still below the level of the clavicles  Difference could be accentuated by increasingly lordotic position  Intact median sternotomy  wires  Aortic valvular prosthesis  New nasogastric tube with tip at the stomach and side-port at the distal esophagus   Heart shadow is enlarged but unchanged from prior exam  Central patchy opacities appear decreased  However, there is no platelike consolidation in the left midlung field  No pneumothorax or effusion  Bones are unremarkable  Impression: 1  Endotracheal tube tip appears slightly retracted from prior  However, tip remains below the clavicles in the thoracic trachea  2   Alveolar edema appears decreased, but there are new platelike opacities in the left midlung field with atelectasis  3   Enteric tube tip in the stomach with side-port in distal esophagus  If the tube is being used for decompression, positioning is adequate  If the tube is being used for feeding, recommend advancing by 12 cm  4   No pneumothorax  Workstation performed: [id number]     Xr Chest 1 View Portable    Result Date: 1/24/2019  Narrative: CHEST INDICATION:   Postintubation  COMPARISON:  1/23/2019 EXAM PERFORMED/VIEWS:  XR CHEST PORTABLE FINDINGS:  Appropriately positioned endotracheal tube tip measuring 3 cm from the raad  Intact median sternotomy wires  Aortic valvular replacement  Heart shadow is enlarged but unchanged from prior exam   Persistent pulmonary vascular engorgement and cephalization  Increasing patchy bilateral consolidations  No pneumothorax or effusion  Bones are unremarkable  Impression: 1  Increasing symmetric consolidative opacities  This most likely represents increasing alveolar edema  Correlate with BNP, if needed  Multifocal pneumonia is felt to be less likely given presence of pulmonary vascular congestion  2   Appropriately positioned endotracheal tube  Workstation performed: [id number]     Xr Chest 1 View Portable    Result Date: 1/23/2019  Narrative: CHEST INDICATION:   Chest pain, shortness of breath, and SVT  COMPARISON:  10/31/2014 EXAM PERFORMED/VIEWS:  XR CHEST PORTABLE FINDINGS:  Intact median sternotomy wires without dehiscence  Dilated cardiomyopathy  Pulmonary vascular engorgement and cephalization    Patchy central opacities suggesting alveolar edema  No pneumothorax or effusion  Bones are unremarkable  Impression: Findings of volume overload including pulmonary vascular congestion and mild alveolar edema  No effusions demonstrated  Workstation performed: [id number]     Ct Head Without Contrast    Result Date: 1/23/2019  Narrative: CT BRAIN - WITHOUT CONTRAST INDICATION:   abnormal mental status  COMPARISON:  None  TECHNIQUE:  CT examination of the brain was performed  In addition to axial images, coronal 2D reformatted images were created and submitted for interpretation  Radiation dose length product (DLP) for this visit:  966 93 mGy-cm   This examination, like all CT scans performed in the [facility], was performed utilizing techniques to minimize radiation dose exposure, including the use of iterative  reconstruction and automated exposure control  IMAGE QUALITY:  Image quality is limited through the posterior fossa  FINDINGS: PARENCHYMA:  There is no visible parenchymal mass or hemorrhage or midline shift  In the right frontal lobe, inferiorly, on image 27 series 2 there seems to be an area of diminished distinction between gray matter and white matter which might signify an  area of subacute ischemia, however, in this location it might simply be artifact  Suggest short interval follow-up in about 12 hours for reassessment  Overall, the finding is favored to be an artifact  An MRI, if possible, would presumably be much more definitive  VENTRICLES AND EXTRA-AXIAL SPACES:  Normal for the patient's age  VISUALIZED ORBITS AND PARANASAL SINUSES:  There is prominence of the intraorbital vessels bilaterally  These may simply be varicosities  There does not appear to be any evidence of acute pathology in the cavernous sinus  Sinuses are clear  CALVARIUM AND EXTRACRANIAL SOFT TISSUES:  No acute findings  Small amount of right mastoid fluid noted incidentally       Impression: Area of low density/diminished gray-white distinction in the inferior right frontal lobe which is probably artifactual, however, 12 hour follow-up reassessment suggested as this could be subacute ischemia  No hemorrhage  Dilated intraorbital vessels bilaterally perhaps venous varicosities  No gross evidence of pathology of the cavernous sinus  Workstation performed: [id number]       EKG personally reviewed by Beny Poe MD      Counseling / Coordination of Care  Total floor / unit time spent today 30 minutes  Greater than 50% of total time was spent with the patient and / or family counseling and / or coordination of care

## 2019-01-26 NOTE — RESPIRATORY THERAPY NOTE
RT Ventilator Management Note  Kira Borja 60 y o  male MRN: [id number]  Unit/Bed#: [facility] 11 Encounter: [id number]      Daily Screen       1/24/2019 0738 1/25/2019 0915          Patient safety screen outcome[de-identified] Failed Failed      Not Ready for Weaning due to[de-identified] Hemodynamically unstable; Underline problem not resolved;Poor inspiratory effort Underline problem not resolved              Physical Exam:   Assessment Type: (P) Assess only  General Appearance: (P) Awake  Respiratory Pattern: (P) Assisted  Chest Assessment: (P) Chest expansion symmetrical  Bilateral Breath Sounds: (P) Diminished  R Breath Sounds: (P) Diminished, Clear  L Breath Sounds: (P) Diminished, Clear  Suction: ET Tube  O2 Device: vent      Resp Comments: (P) Pt remains on AC settings and is awake and able to follow commands  VS are stable and pt appears comfortable  Will continue to monitor

## 2019-01-26 NOTE — PROGRESS NOTES
Progress Note - Infectious Disease   Veronica Burkett 60 y o  male MRN: [id number]  Unit/Bed#: [facility] 11 Encounter: [id number]      Impression/Recommendations:  1  Septic shock  Fever, tachycardia, leukocytosis, hypotension  Also with component of cardiogenic shock  Likely precipitated by MSSA bacteremia  No other clear evidence of acute infection identified  Fevers and leukocytosis much improved  Patient remains on multiple vasopressors, which are slowly being weaned down      -antibiotic plan as below  -monitor temperatures and hemodynamics  -check CBC in a m   -supportive care     2  MSSA bacteremia  Strong possibility of endocarditis in the setting of bioprosthetic AVR  CHON with no evidence of valvular vegetations  Initial portal of entry may have been related to multiple upper back wounds, which appear scabbed over with no overt evidence of acute infection on exam   CT chest/abdomen/pelvis with no other evidence of acute infection  Repeat blood cultures drawn on 01/24 remain positive       -continue with renally dosed IV cefazolin  -followup repeat blood cultures drawn this morning to ensure clearance of bacteremia     3  History of bioprosthetic AVR  Secondary to degenerative AS  Placed in July 2014       4  Acute kidney injury  Likely ischemic/septic ATN  Currently on CRRT  Nephrology following closely  Will dose adjust antibiotic with CRRT      5  Acute hypoxic respiratory failure  Likely in the setting of septic shock as well as acute systolic cardiomyopathy/volume overload  Recent CT chest with no evidence to suggest pulmonary infection      -wean off ventilator as able  -monitor secretions  -monitor O2 requirements     6  Elevated CPK  Unclear etiology  Patient with no reported recent fall or traumatic injury  CPK level continues to improve  Continue to monitor for now      7  Abnormal LFTs  Likely secondary to shock state  LFTs much improved        Antibiotics:  Cefazolin     Discussed above plan with critical care team and with patient's family at bedside  Subjective:  No fevers  Vasopressors being slowly weaned off  Remains on CRRT  Remains intubated  No other acute overnight events  Objective:  Vitals:  Temp:  [97 9 °F (36 6 °C)-99 3 °F (37 4 °C)] 98 1 °F (36 7 °C)  HR:  [102-120] 110  Resp:  [22-34] 22  SpO2:  [94 %-97 %] 95 %  Temp (24hrs), Av 6 °F (37 °C), Min:97 9 °F (36 6 °C), Max:99 3 °F (37 4 °C)  Current: Temperature: 98 1 °F (36 7 °C)    Physical Exam:   General:  Awake on ventilator  Eyes:  Conjunctive clear with no hemorrhages or effusions  Oropharynx:  ET tube in place  Neck:  Supple, no lymphadenopathy  Lungs:  Ventilated breath sounds  Cardiac:  Regular rate and rhythm, systolic click  Abdomen:  Soft, non-tender, non-distented  Extremities:  Stable edema  Skin:  Stable scabbed over lesions on upper back  Neurological:  Follow some commands    Lab Results:  I have personally reviewed pertinent labs  Results from last 7 days  Lab Units 19  0601 19  0000 19  1801  19  0410 19  1613   POTASSIUM mmol/L 4 2 3 9 4 7  < > 3 7 3 9   CHLORIDE mmol/L 107 105 106  < > 104 107   CO2 mmol/L 21 23 21  < > 20* 17*   BUN mg/dL 36* 34* 39*  < > 45* 52*   CREATININE mg/dL 2 89* 2 88* 3 14*  < > 3 64* 4 03*   EGFR ml/min/1 73sq m 23 23 21  < > 17 15   CALCIUM mg/dL 8 2* 8 1* 8 2*  < > 7 5* 6 6*   AST U/L 551*  --   --   --  1,114* 1,513*   ALT U/L 159*  --   --   --  323* 344*   ALK PHOS U/L 79  --   --   --  80 73   < > = values in this interval not displayed      Results from last 7 days  Lab Units 19  0515 19  0410 19  1613   WBC Thousand/uL 13 48* 11 12* 12 29*   HEMOGLOBIN g/dL 11 8* 13 5 13 8   PLATELETS Thousands/uL 46* 57* 64*       Results from last 7 days  Lab Units 19  1255 19  1008 19  2301 19  1850 19  1730 19  1654 19  1600   BLOOD CULTURE  Staphylococcus aureus*  Staphylococcus aureus*  --   --   --   --  Staphylococcus aureus* Staphylococcus aureus*   SPUTUM CULTURE   --  Culture results to follow  --   --   --   --   --    GRAM STAIN RESULT  Gram positive cocci in clusters  Gram positive cocci in clusters No Polys or Bacteria seen  --   --   --  Gram positive cocci in clusters Gram positive cocci in clusters   URINE CULTURE   --   --   --   --  5839-8305 cfu/ml Gram Positive Cocci*  --   --    INFLUENZA B PCR   --   --   --  None Detected  --   --   --    RSV PCR   --   --   --  None Detected  --   --   --    LEGIONELLA URINARY ANTIGEN   --   --  Negative  --   --   --   --        Imaging Studies:   I have personally reviewed pertinent imaging study reports and images in PACS  Chest x-ray shows stable mild vascular congestion    EKG, Pathology, and Other Studies:   I have personally reviewed pertinent reports

## 2019-01-26 NOTE — PLAN OF CARE
GENITOURINARY - ADULT     Maintains or returns to baseline urinary function Not Progressing          CARDIOVASCULAR - ADULT     Maintains optimal cardiac output and hemodynamic stability Progressing     Absence of cardiac dysrhythmias or at baseline rhythm Progressing        GENITOURINARY - ADULT     Absence of urinary retention Progressing     Urinary catheter remains patent Progressing        INFECTION - ADULT     Absence or prevention of progression during hospitalization Progressing        Knowledge Deficit     Patient/family/caregiver demonstrates understanding of disease process, treatment plan, medications, and discharge instructions Progressing        METABOLIC, FLUID AND ELECTROLYTES - ADULT     Electrolytes maintained within normal limits Progressing     Fluid balance maintained Progressing        Nutrition/Hydration-ADULT     Nutrient/Hydration intake appropriate for improving, restoring or maintaining nutritional needs Progressing        PAIN - ADULT     Verbalizes/displays adequate comfort level or baseline comfort level Progressing        Potential for Falls     Patient will remain free of falls Progressing        Prexisting or High Potential for Compromised Skin Integrity     Skin integrity is maintained or improved Progressing        SAFETY ADULT     Maintain or return to baseline ADL function Progressing     Maintain or return mobility status to optimal level Progressing     Patient will remain free of falls Progressing        SAFETY,RESTRAINT: NV/NON-SELF DESTRUCTIVE BEHAVIOR     Remains free of harm/injury (restraint for non violent/non self-detsructive behavior) Progressing     Returns to optimal restraint-free functioning Progressing

## 2019-01-26 NOTE — PROGRESS NOTES
NEPHROLOGY PROGRESS NOTE   Yue Martínez 60 y o  male MRN: [id number]  [facility] En[facility]: [id number]  Reason for Consult: PATRICK    ASSESSMENT AND PLAN:  Patient is 59-year-old male with CHF, EF 30%, status post bioprosthetic AVR initially presented after having episode of fever, becoming confused and significant dyspnea  Was transferred to [facility] [place] [facility] [place] due to further do tried eating medical condition requiring intubation, multiple pressors due to significant hypotension, septic shock  We are consulted for [facility] management      Oligo anuric PATRICK, baseline serum creatinine 0 7 to 0 8  -PATRICK most likely secondary to ischemic/septic ATN  -due to progressive worsening renal failure, concern for mild volume overload, worsening acidosis, patient was started on CVVHD on 1/24/19  CVVHD was changed to CVVH pre filter yesterday  Patient seen and examined on CVVH at 6:20 a m  See Velasquez Patient had one time filter change overnight  Currently otherwise tolerating CRRT well at the time of my encounter  -FF 18, will increase blood flow 330 mL/minute  -Goal UF removal 0 to 50 mL/hour as blood pressure tolerated  -continue to closely monitor intake and output  Avoid nephrotoxins or NSAIDs  -urinalysis showed very concentrated sample, greater than 3+ proteinuria, innumerable RBCs, filled obscured four WBCs/bacteria  Positive nitrate   -recent CT scan without contrast shows no hydronephrosis, otherwise unremarkable kidneys     Septic shock/component of cardiogenic shock, MSSA bacteremia  -CHON did not show evidence of valvular vegetations    -antibiotic as per ICU team   Currently remains on Levophed and vasopressin      Metabolic acidosis  -lactic acid improved to 1 3   -likely secondary to worsened renal failure in the setting of septic shock   -bicarb is overall improved and stable  Continue to monitor closely on CRRT    Now remains off bicarb drip      Mild volume overload,  -UF removal as above as blood pressure tolerated  -CHF with EF 30%  Close cardiology follow-up  -urine output remains poor      History of aortic stenosis, status post bioprosthetic AVR in 2014  VDRF, 40% FiO2     Discussed with ICU team    SUBJECTIVE:  Patient seen and examined at bedside  Remains critically ill and intubated  Continue to require Levophed and vasopressin  Patient had one time filter clotted overnight       OBJECTIVE:  Current Weight: Weight - Scale: (!) 145 kg (319 lb 3 6 oz)  Vitals:    01/26/19 0600   BP:    Pulse: (!) 110   Resp: (!) 24   Temp: 99 °F (37 2 °C)   SpO2: 95%       Intake/Output Summary (Last 24 hours) at 01/26/19 1854  Last data filed at 01/26/19 0600   Gross per 24 hour   Intake          3291 51 ml   Output             3080 ml   Net           211 51 ml       Physical Examination:  General:  Lying in bed, intubated   Eyes:  Mild conjunctival pallor present  ENT:  ET tube present  Neck:  Supple  Respiratory:  Bilateral coarse breath sound  CVS:  S1, S2 present  GI:  Soft, nontender, nondistended  CNS:  Intubated, sedated, does not follow commands  Extremities:  Trace to 1+ edema in lower legs  Skin:  No new rash in legs    Medications:    Current Facility-Administered Medications:     acetaminophen (TYLENOL) oral suspension 650 mg, 650 mg, Oral, Q6H PRN, Yunior Aguilar MD, 650 mg at 01/24/19 1946    albuterol inhalation solution 2 5 mg, 2 5 mg, Nebulization, Q4H PRN, Yany Paul PA-C    bisacodyl (DULCOLAX) rectal suppository 10 mg, 10 mg, Rectal, Daily PRN, Yany Paul PA-C    calcium gluconate 1 g in sodium chloride 0 9 % 100 mL IVPB, 1 g, Intravenous, Once, Nic Espino MD    ceFAZolin (ANCEF) IVPB (premix) 2,000 mg, 2,000 mg, Intravenous, Q12H, Donny Mejia PA-C, Stopped at 01/25/19 2211    chlorhexidine (PERIDEX) 0 12 % oral rinse 15 mL, 15 mL, Swish & Spit, Q12H [facility], Yany Paul PA-C, 15 mL at 01/25/19 2001    dexmedetomidine (PRECEDEX) 200 mcg in sodium chloride 0 9 % 50 mL infusion, 0 1-0 7 mcg/kg/hr, Intravenous, Titrated, Randy Sidhu MD, Last Rate: 25 4 mL/hr at 01/26/19 0523, 0 7 mcg/kg/hr at 01/26/19 0523    famotidine (PEPCID) oral suspension 20 mg, 20 mg, Oral, Daily, Tony Mejia PA-C, 20 mg at 01/25/19 1112    fentanyl citrate (PF) 100 MCG/2ML 50 mcg, 50 mcg, Intravenous, Q1H PRN, Randy Sidhu MD, 50 mcg at 01/25/19 2016    hydrocortisone sodium succinate (PF) (Solu-CORTEF) injection 50 mg, 50 mg, Intravenous, Q6H [address], Yrn Tom, DO, 50 mg at 01/26/19 0521    insulin lispro (HumaLOG) 100 units/mL subcutaneous injection 5-25 Units, 5-25 Units, Subcutaneous, Q6H [address], 5 Units at 01/26/19 0521 **AND** Fingerstick Glucose (POCT), , , Q6H, Riley Osuna PA-C    Milrinone Lactate in Dextrose (PRIMACOR) 20 MG/100 ML infusion (premix), 0 25 mcg/kg/min, Intravenous, Continuous, Randy Sidhu MD, Last Rate: 10 9 mL/hr at 01/26/19 0623, 0 25 mcg/kg/min at 01/26/19 9286    norepinephrine (LEVOPHED) 8 mg (DOUBLE CONCENTRATION) IV in sodium chloride 0 9% 250 mL, 1-30 mcg/min, Intravenous, Titrated, Riley Osuna PA-C, Last Rate: 15 mL/hr at 01/26/19 0558, 8 mcg/min at 01/26/19 0558    NxStage K 4/Ca 3 dialysis solution (RFP-401) 20,000 mL, 20,000 mL, Dialysis, Continuous, Ismael Cain MD, Last Rate: 0 mL/hr at 01/25/19 1510, 20,000 mL at 01/26/19 7081    nystatin (MYCOSTATIN) powder, , Topical, BID, Riley Osuna PA-C    senna 8 8 mg/5 mL oral syrup 8 8 mg, 8 8 mg, Oral, HS, Riley Osuna PA-C, 8 8 mg at 01/25/19 2300    sodium phosphate 6 mmol in sodium chloride 0 9 % 100 mL Infusion, 6 mmol, Intravenous, Once, Trevin Roy MD    vasopressin (PITRESSIN) 20 Units in sodium chloride 0 9 % 100 mL infusion, 0 02 Units/min, Intravenous, Continuous, Randy Sidhu MD, Last Rate: 6 mL/hr at 01/25/19 2156, 0 02 Units/min at 01/25/19 2156    Laboratory Results:    Results from last 7 days  Lab Units 01/26/19  0601 01/26/19  0000 01/25/19  1801 01/25/19  1222 01/25/19  0410 01/24/19  1613 01/24/19  0502  01/23/19  2303  01/23/19  1600   WBC Thousand/uL  --   --   --   --  11 12* 12 29* 12 10*  --  12 27*  --  16 01*   HEMOGLOBIN g/dL  --   --   --   --  13 5 13 8 13 3  --  14 4  --  14 6   HEMATOCRIT %  --   --   --   --  42 5 44 2 43 4  --  46 4  --  47 0   PLATELETS Thousands/uL  --   --   --   --  57* 64* 81*  --  83*  --  112*   POTASSIUM mmol/L 4 2 3 9 4 7 3 9 3 7 3 9 3 7  < >  --   < > 3 9   CHLORIDE mmol/L 107 105 106 105 104 107 106  < >  --   < > 99*   CO2 mmol/L 21 23 21 20* 20* 17* 22  < >  --   < > 25   BUN mg/dL 36* 34* 39* 40* 45* 52* 45*  < >  --   < > 34*   CREATININE mg/dL 2 89* 2 88* 3 14* 3 55* 3 64* 4 03* 3 70*  < >  --   < > 2 63*   CALCIUM mg/dL 8 2* 8 1* 8 2* 7 9* 7 5* 6 6* 7 0*  < >  --   < > 8 0*   MAGNESIUM mg/dL 2 1 2 0 2 1 2 1 2 0 2 2 2 0  --   --   --  2 0   PHOSPHORUS mg/dL 2 4* 2 5* 2 3* 2 9 3 1 4 6* 4 4  --   --   --   --    < > = values in this interval not displayed  Results for orders placed during the hospital encounter of 01/24/19   XR chest portable    Narrative CHEST     INDICATION:   ETT check  COMPARISON:  January 24, 2019  EXAM PERFORMED/VIEWS:  XR CHEST PORTABLE      FINDINGS:      Endotracheal tube is present, in satisfactory position with its tip above the level of the raad  Enteric tube is present with its tip extending below the left hemidiaphragm  Right and left internal jugular central venous catheters are noted  Sternotomy wires are present  Arch is mildly enlarged  Prosthetic cardiac valve is noted  Mild pulmonary vascular congestive changes are seen  No significant pleural effusion  No pneumothorax  No acute osseous abnormality  Impression Expected positioning of endotracheal and enteric tubes  A right internal jugular central venous catheter has been changed since previous examination with a new one being larger in caliber  No pneumothorax          Workstation performed: [id number] No results found for this or any previous visit  No results found for this or any previous visit  No results found for this or any previous visit  No results found for this or any previous visit  No results found for this or any previous visit  Portions of the record may have been created with voice recognition software  Occasional wrong word or "sound a like" substitutions may have occurred due to the inherent limitations of voice recognition software  Read the chart carefully and recognize, using context, where substitutions have occurred

## 2019-01-27 LAB
ALBUMIN SERPL BCP-MCNC: 2.2 G/DL (ref 3.5–5)
ALBUMIN SERPL BCP-MCNC: 2.3 G/DL (ref 3.5–5)
ALP SERPL-CCNC: 88 U/L (ref 46–116)
ALP SERPL-CCNC: 90 U/L (ref 46–116)
ALT SERPL W P-5'-P-CCNC: 59 U/L (ref 12–78)
ALT SERPL W P-5'-P-CCNC: 64 U/L (ref 12–78)
ANION GAP SERPL CALCULATED.3IONS-SCNC: 5 MMOL/L (ref 4–13)
ANION GAP SERPL CALCULATED.3IONS-SCNC: 7 MMOL/L (ref 4–13)
ANION GAP SERPL CALCULATED.3IONS-SCNC: 8 MMOL/L (ref 4–13)
ANION GAP SERPL CALCULATED.3IONS-SCNC: 9 MMOL/L (ref 4–13)
APTT PPP: 27 SECONDS (ref 26–38)
APTT PPP: 28 SECONDS (ref 26–38)
APTT PPP: 28 SECONDS (ref 26–38)
AST SERPL W P-5'-P-CCNC: 277 U/L (ref 5–45)
AST SERPL W P-5'-P-CCNC: 279 U/L (ref 5–45)
BACTERIA SPT RESP CULT: ABNORMAL
BASE EX.OXY STD BLDV CALC-SCNC: 62 % (ref 60–80)
BASE EX.OXY STD BLDV CALC-SCNC: 83.1 % (ref 60–80)
BASE EX.OXY STD BLDV CALC-SCNC: 85.6 % (ref 60–80)
BASE EXCESS BLDV CALC-SCNC: -0.1 MMOL/L
BASE EXCESS BLDV CALC-SCNC: -1.7 MMOL/L
BASE EXCESS BLDV CALC-SCNC: -2.8 MMOL/L
BASOPHILS # BLD AUTO: 0.04 THOUSANDS/ΜL (ref 0–0.1)
BASOPHILS NFR BLD AUTO: 0 % (ref 0–1)
BILIRUB DIRECT SERPL-MCNC: 0.17 MG/DL (ref 0–0.2)
BILIRUB SERPL-MCNC: 0.4 MG/DL (ref 0.2–1)
BILIRUB SERPL-MCNC: 0.42 MG/DL (ref 0.2–1)
BUN SERPL-MCNC: 32 MG/DL (ref 5–25)
BUN SERPL-MCNC: 33 MG/DL (ref 5–25)
CA-I BLD-SCNC: 1.14 MMOL/L (ref 1.12–1.32)
CA-I BLD-SCNC: 1.15 MMOL/L (ref 1.12–1.32)
CA-I BLD-SCNC: 1.17 MMOL/L (ref 1.12–1.32)
CA-I BLD-SCNC: 1.18 MMOL/L (ref 1.12–1.32)
CA-I BLD-SCNC: 1.24 MMOL/L (ref 1.12–1.32)
CALCIUM SERPL-MCNC: 7.9 MG/DL (ref 8.3–10.1)
CALCIUM SERPL-MCNC: 8.2 MG/DL (ref 8.3–10.1)
CALCIUM SERPL-MCNC: 8.3 MG/DL (ref 8.3–10.1)
CALCIUM SERPL-MCNC: 8.5 MG/DL (ref 8.3–10.1)
CHLORIDE SERPL-SCNC: 106 MMOL/L (ref 100–108)
CHLORIDE SERPL-SCNC: 106 MMOL/L (ref 100–108)
CHLORIDE SERPL-SCNC: 107 MMOL/L (ref 100–108)
CHLORIDE SERPL-SCNC: 108 MMOL/L (ref 100–108)
CK MB SERPL-MCNC: 14.9 NG/ML (ref 0–5)
CK MB SERPL-MCNC: <1 % (ref 0–2.5)
CK SERPL-CCNC: 3180 U/L (ref 39–308)
CO2 SERPL-SCNC: 22 MMOL/L (ref 21–32)
CO2 SERPL-SCNC: 24 MMOL/L (ref 21–32)
CO2 SERPL-SCNC: 24 MMOL/L (ref 21–32)
CO2 SERPL-SCNC: 25 MMOL/L (ref 21–32)
CREAT SERPL-MCNC: 2.41 MG/DL (ref 0.6–1.3)
CREAT SERPL-MCNC: 2.48 MG/DL (ref 0.6–1.3)
CREAT SERPL-MCNC: 2.54 MG/DL (ref 0.6–1.3)
CREAT SERPL-MCNC: 2.58 MG/DL (ref 0.6–1.3)
EOSINOPHIL # BLD AUTO: 0 THOUSAND/ΜL (ref 0–0.61)
EOSINOPHIL NFR BLD AUTO: 0 % (ref 0–6)
ERYTHROCYTE [DISTWIDTH] IN BLOOD BY AUTOMATED COUNT: 16.5 % (ref 11.6–15.1)
GFR SERPL CREATININE-BSD FRML MDRD: 26 ML/MIN/1.73SQ M
GFR SERPL CREATININE-BSD FRML MDRD: 27 ML/MIN/1.73SQ M
GFR SERPL CREATININE-BSD FRML MDRD: 27 ML/MIN/1.73SQ M
GFR SERPL CREATININE-BSD FRML MDRD: 28 ML/MIN/1.73SQ M
GLUCOSE SERPL-MCNC: 183 MG/DL (ref 65–140)
GLUCOSE SERPL-MCNC: 191 MG/DL (ref 65–140)
GLUCOSE SERPL-MCNC: 195 MG/DL (ref 65–140)
GLUCOSE SERPL-MCNC: 198 MG/DL (ref 65–140)
GLUCOSE SERPL-MCNC: 201 MG/DL (ref 65–140)
GLUCOSE SERPL-MCNC: 202 MG/DL (ref 65–140)
GLUCOSE SERPL-MCNC: 204 MG/DL (ref 65–140)
GLUCOSE SERPL-MCNC: 207 MG/DL (ref 65–140)
GLUCOSE SERPL-MCNC: 208 MG/DL (ref 65–140)
GRAM STN SPEC: ABNORMAL
HCO3 BLDV-SCNC: 21.7 MMOL/L (ref 24–30)
HCO3 BLDV-SCNC: 22.8 MMOL/L (ref 24–30)
HCO3 BLDV-SCNC: 24.9 MMOL/L (ref 24–30)
HCT VFR BLD AUTO: 35.1 % (ref 36.5–49.3)
HGB BLD-MCNC: 11.1 G/DL (ref 12–17)
HOROWITZ INDEX BLDA+IHG-RTO: 40 MM[HG]
IMM GRANULOCYTES # BLD AUTO: >0.5 THOUSAND/UL (ref 0–0.2)
IMM GRANULOCYTES NFR BLD AUTO: 5 % (ref 0–2)
INR PPP: 0.95 (ref 0.86–1.17)
LYMPHOCYTES # BLD AUTO: 0.62 THOUSANDS/ΜL (ref 0.6–4.47)
LYMPHOCYTES NFR BLD AUTO: 4 % (ref 14–44)
MAGNESIUM SERPL-MCNC: 2 MG/DL (ref 1.6–2.6)
MAGNESIUM SERPL-MCNC: 2.1 MG/DL (ref 1.6–2.6)
MCH RBC QN AUTO: 25.9 PG (ref 26.8–34.3)
MCHC RBC AUTO-ENTMCNC: 31.6 G/DL (ref 31.4–37.4)
MCV RBC AUTO: 82 FL (ref 82–98)
MONOCYTES # BLD AUTO: 1.4 THOUSAND/ΜL (ref 0.17–1.22)
MONOCYTES NFR BLD AUTO: 9 % (ref 4–12)
NEUTROPHILS # BLD AUTO: 12.62 THOUSANDS/ΜL (ref 1.85–7.62)
NEUTS SEG NFR BLD AUTO: 82 % (ref 43–75)
NRBC BLD AUTO-RTO: 0 /100 WBCS
O2 CT BLDV-SCNC: 10.7 ML/DL
O2 CT BLDV-SCNC: 14.4 ML/DL
O2 CT BLDV-SCNC: 14.9 ML/DL
PCO2 BLDV: 36.8 MM HG (ref 42–50)
PCO2 BLDV: 37.5 MM HG (ref 42–50)
PCO2 BLDV: 41.8 MM HG (ref 42–50)
PEEP RESPIRATORY: 5 CM[H2O]
PH BLDV: 7.39 [PH] (ref 7.3–7.4)
PH BLDV: 7.39 [PH] (ref 7.3–7.4)
PH BLDV: 7.4 [PH] (ref 7.3–7.4)
PHOSPHATE SERPL-MCNC: 2.3 MG/DL (ref 2.7–4.5)
PHOSPHATE SERPL-MCNC: 2.4 MG/DL (ref 2.7–4.5)
PHOSPHATE SERPL-MCNC: 2.5 MG/DL (ref 2.7–4.5)
PHOSPHATE SERPL-MCNC: 2.5 MG/DL (ref 2.7–4.5)
PLATELET # BLD AUTO: 46 THOUSANDS/UL (ref 149–390)
PO2 BLDV: 33 MM HG (ref 35–45)
PO2 BLDV: 51.1 MM HG (ref 35–45)
PO2 BLDV: 54.2 MM HG (ref 35–45)
POTASSIUM SERPL-SCNC: 4 MMOL/L (ref 3.5–5.3)
POTASSIUM SERPL-SCNC: 4.1 MMOL/L (ref 3.5–5.3)
POTASSIUM SERPL-SCNC: 4.2 MMOL/L (ref 3.5–5.3)
POTASSIUM SERPL-SCNC: 4.2 MMOL/L (ref 3.5–5.3)
PROCALCITONIN SERPL-MCNC: 78.86 NG/ML
PROT SERPL-MCNC: 5.6 G/DL (ref 6.4–8.2)
PROT SERPL-MCNC: 5.8 G/DL (ref 6.4–8.2)
PROTHROMBIN TIME: 12.8 SECONDS (ref 11.8–14.2)
RBC # BLD AUTO: 4.29 MILLION/UL (ref 3.88–5.62)
SODIUM SERPL-SCNC: 136 MMOL/L (ref 136–145)
SODIUM SERPL-SCNC: 138 MMOL/L (ref 136–145)
SODIUM SERPL-SCNC: 138 MMOL/L (ref 136–145)
SODIUM SERPL-SCNC: 139 MMOL/L (ref 136–145)
VENT AC: 16
VENT- AC: AC
VT SETTING VENT: 500 ML
WBC # BLD AUTO: 15.47 THOUSAND/UL (ref 4.31–10.16)

## 2019-01-27 PROCEDURE — 87040 BLOOD CULTURE FOR BACTERIA: CPT | Performed by: PHYSICIAN ASSISTANT

## 2019-01-27 PROCEDURE — 84145 PROCALCITONIN (PCT): CPT | Performed by: PHYSICIAN ASSISTANT

## 2019-01-27 PROCEDURE — 83735 ASSAY OF MAGNESIUM: CPT | Performed by: INTERNAL MEDICINE

## 2019-01-27 PROCEDURE — 94003 VENT MGMT INPAT SUBQ DAY: CPT

## 2019-01-27 PROCEDURE — 82948 REAGENT STRIP/BLOOD GLUCOSE: CPT

## 2019-01-27 PROCEDURE — 85025 COMPLETE CBC W/AUTO DIFF WBC: CPT | Performed by: PHYSICIAN ASSISTANT

## 2019-01-27 PROCEDURE — 82330 ASSAY OF CALCIUM: CPT | Performed by: INTERNAL MEDICINE

## 2019-01-27 PROCEDURE — 80048 BASIC METABOLIC PNL TOTAL CA: CPT | Performed by: INTERNAL MEDICINE

## 2019-01-27 PROCEDURE — 85730 THROMBOPLASTIN TIME PARTIAL: CPT | Performed by: INTERNAL MEDICINE

## 2019-01-27 PROCEDURE — 84100 ASSAY OF PHOSPHORUS: CPT | Performed by: INTERNAL MEDICINE

## 2019-01-27 PROCEDURE — 85610 PROTHROMBIN TIME: CPT | Performed by: NURSE PRACTITIONER

## 2019-01-27 PROCEDURE — 80053 COMPREHEN METABOLIC PANEL: CPT | Performed by: NURSE PRACTITIONER

## 2019-01-27 PROCEDURE — 90945 DIALYSIS ONE EVALUATION: CPT | Performed by: INTERNAL MEDICINE

## 2019-01-27 PROCEDURE — 82805 BLOOD GASES W/O2 SATURATION: CPT | Performed by: NURSE PRACTITIONER

## 2019-01-27 PROCEDURE — 99232 SBSQ HOSP IP/OBS MODERATE 35: CPT | Performed by: INTERNAL MEDICINE

## 2019-01-27 PROCEDURE — 82805 BLOOD GASES W/O2 SATURATION: CPT | Performed by: PHYSICIAN ASSISTANT

## 2019-01-27 PROCEDURE — 80076 HEPATIC FUNCTION PANEL: CPT | Performed by: PHYSICIAN ASSISTANT

## 2019-01-27 PROCEDURE — 82805 BLOOD GASES W/O2 SATURATION: CPT | Performed by: ANESTHESIOLOGY

## 2019-01-27 PROCEDURE — 90945 DIALYSIS ONE EVALUATION: CPT

## 2019-01-27 PROCEDURE — 93005 ELECTROCARDIOGRAM TRACING: CPT

## 2019-01-27 PROCEDURE — 82553 CREATINE MB FRACTION: CPT | Performed by: PHYSICIAN ASSISTANT

## 2019-01-27 PROCEDURE — 94760 N-INVAS EAR/PLS OXIMETRY 1: CPT

## 2019-01-27 PROCEDURE — 82550 ASSAY OF CK (CPK): CPT | Performed by: PHYSICIAN ASSISTANT

## 2019-01-27 PROCEDURE — 99291 CRITICAL CARE FIRST HOUR: CPT | Performed by: INTERNAL MEDICINE

## 2019-01-27 RX ORDER — INSULIN GLARGINE 100 [IU]/ML
15 INJECTION, SOLUTION SUBCUTANEOUS ONCE
Status: COMPLETED | OUTPATIENT
Start: 2019-01-27 | End: 2019-01-27

## 2019-01-27 RX ADMIN — INSULIN LISPRO 5 UNITS: 100 INJECTION, SOLUTION INTRAVENOUS; SUBCUTANEOUS at 18:04

## 2019-01-27 RX ADMIN — INSULIN LISPRO 10 UNITS: 100 INJECTION, SOLUTION INTRAVENOUS; SUBCUTANEOUS at 00:15

## 2019-01-27 RX ADMIN — Medication 20000 ML: at 21:29

## 2019-01-27 RX ADMIN — NOREPINEPHRINE BITARTRATE 6 MCG/MIN: 1 INJECTION INTRAVENOUS at 04:23

## 2019-01-27 RX ADMIN — SODIUM PHOSPHATE, MONOBASIC, MONOHYDRATE 6 MMOL: 276; 142 INJECTION, SOLUTION INTRAVENOUS at 21:04

## 2019-01-27 RX ADMIN — CHLORHEXIDINE GLUCONATE 0.12% ORAL RINSE 15 ML: 1.2 LIQUID ORAL at 08:11

## 2019-01-27 RX ADMIN — DEXMEDETOMIDINE 0.7 MCG/KG/HR: 100 INJECTION, SOLUTION, CONCENTRATE INTRAVENOUS at 15:30

## 2019-01-27 RX ADMIN — MILRINONE LACTATE IN DEXTROSE 0.13 MCG/KG/MIN: 200 INJECTION, SOLUTION INTRAVENOUS at 19:20

## 2019-01-27 RX ADMIN — INSULIN LISPRO 10 UNITS: 100 INJECTION, SOLUTION INTRAVENOUS; SUBCUTANEOUS at 05:13

## 2019-01-27 RX ADMIN — VASOPRESSIN 0.04 UNITS/MIN: 20 INJECTION INTRAVENOUS at 21:09

## 2019-01-27 RX ADMIN — DEXMEDETOMIDINE 0.7 MCG/KG/HR: 100 INJECTION, SOLUTION, CONCENTRATE INTRAVENOUS at 13:59

## 2019-01-27 RX ADMIN — INSULIN GLARGINE 15 UNITS: 100 INJECTION, SOLUTION SUBCUTANEOUS at 10:18

## 2019-01-27 RX ADMIN — FENTANYL CITRATE 50 MCG/HR: 50 INJECTION, SOLUTION INTRAMUSCULAR; INTRAVENOUS at 06:17

## 2019-01-27 RX ADMIN — SODIUM PHOSPHATE, MONOBASIC, MONOHYDRATE 6 MMOL: 276; 142 INJECTION, SOLUTION INTRAVENOUS at 15:00

## 2019-01-27 RX ADMIN — NYSTATIN: 100000 POWDER TOPICAL at 08:11

## 2019-01-27 RX ADMIN — SODIUM PHOSPHATE, MONOBASIC, MONOHYDRATE 6 MMOL: 276; 142 INJECTION, SOLUTION INTRAVENOUS at 07:37

## 2019-01-27 RX ADMIN — VASOPRESSIN 0.04 UNITS/MIN: 20 INJECTION INTRAVENOUS at 02:33

## 2019-01-27 RX ADMIN — CHLORHEXIDINE GLUCONATE 0.12% ORAL RINSE 15 ML: 1.2 LIQUID ORAL at 20:00

## 2019-01-27 RX ADMIN — HYDROCORTISONE SODIUM SUCCINATE 50 MG: 100 INJECTION, POWDER, FOR SOLUTION INTRAMUSCULAR; INTRAVENOUS at 17:44

## 2019-01-27 RX ADMIN — SODIUM PHOSPHATE, MONOBASIC, MONOHYDRATE 6 MMOL: 276; 142 INJECTION, SOLUTION INTRAVENOUS at 01:44

## 2019-01-27 RX ADMIN — HYDROCORTISONE SODIUM SUCCINATE 50 MG: 100 INJECTION, POWDER, FOR SOLUTION INTRAMUSCULAR; INTRAVENOUS at 23:11

## 2019-01-27 RX ADMIN — CEFAZOLIN SODIUM 2000 MG: 2 SOLUTION INTRAVENOUS at 08:35

## 2019-01-27 RX ADMIN — DEXMEDETOMIDINE 0.7 MCG/KG/HR: 100 INJECTION, SOLUTION, CONCENTRATE INTRAVENOUS at 19:15

## 2019-01-27 RX ADMIN — Medication 20000 ML: at 00:44

## 2019-01-27 RX ADMIN — MILRINONE LACTATE IN DEXTROSE 0.13 MCG/KG/MIN: 200 INJECTION, SOLUTION INTRAVENOUS at 04:46

## 2019-01-27 RX ADMIN — DEXMEDETOMIDINE 0.7 MCG/KG/HR: 100 INJECTION, SOLUTION, CONCENTRATE INTRAVENOUS at 04:23

## 2019-01-27 RX ADMIN — DEXMEDETOMIDINE 0.7 MCG/KG/HR: 100 INJECTION, SOLUTION, CONCENTRATE INTRAVENOUS at 11:39

## 2019-01-27 RX ADMIN — NYSTATIN: 100000 POWDER TOPICAL at 17:44

## 2019-01-27 RX ADMIN — CALCIUM GLUCONATE 1 G: 98 INJECTION, SOLUTION INTRAVENOUS at 21:04

## 2019-01-27 RX ADMIN — FENTANYL CITRATE 50 MCG: 50 INJECTION, SOLUTION INTRAMUSCULAR; INTRAVENOUS at 22:45

## 2019-01-27 RX ADMIN — DEXMEDETOMIDINE 0.7 MCG/KG/HR: 100 INJECTION, SOLUTION, CONCENTRATE INTRAVENOUS at 07:17

## 2019-01-27 RX ADMIN — VASOPRESSIN 0.04 UNITS/MIN: 20 INJECTION INTRAVENOUS at 11:40

## 2019-01-27 RX ADMIN — HYDROCORTISONE SODIUM SUCCINATE 50 MG: 100 INJECTION, POWDER, FOR SOLUTION INTRAMUSCULAR; INTRAVENOUS at 11:54

## 2019-01-27 RX ADMIN — CEFAZOLIN SODIUM 2000 MG: 2 SOLUTION INTRAVENOUS at 19:33

## 2019-01-27 RX ADMIN — FAMOTIDINE 20 MG: 40 POWDER, FOR SUSPENSION ORAL at 08:11

## 2019-01-27 RX ADMIN — INSULIN LISPRO 5 UNITS: 100 INJECTION, SOLUTION INTRAVENOUS; SUBCUTANEOUS at 11:54

## 2019-01-27 RX ADMIN — DEXMEDETOMIDINE 0.7 MCG/KG/HR: 100 INJECTION, SOLUTION, CONCENTRATE INTRAVENOUS at 23:00

## 2019-01-27 RX ADMIN — Medication 20000 ML: at 08:26

## 2019-01-27 RX ADMIN — DEXMEDETOMIDINE 0.7 MCG/KG/HR: 100 INJECTION, SOLUTION, CONCENTRATE INTRAVENOUS at 02:09

## 2019-01-27 RX ADMIN — CALCIUM GLUCONATE 1 G: 98 INJECTION, SOLUTION INTRAVENOUS at 07:37

## 2019-01-27 RX ADMIN — HYDROCORTISONE SODIUM SUCCINATE 50 MG: 100 INJECTION, POWDER, FOR SOLUTION INTRAMUSCULAR; INTRAVENOUS at 05:15

## 2019-01-27 RX ADMIN — DEXMEDETOMIDINE 0.7 MCG/KG/HR: 100 INJECTION, SOLUTION, CONCENTRATE INTRAVENOUS at 09:40

## 2019-01-27 RX ADMIN — CALCIUM GLUCONATE 1 G: 98 INJECTION, SOLUTION INTRAVENOUS at 01:44

## 2019-01-27 RX ADMIN — DEXMEDETOMIDINE 0.7 MCG/KG/HR: 100 INJECTION, SOLUTION, CONCENTRATE INTRAVENOUS at 20:27

## 2019-01-27 NOTE — RESPIRATORY THERAPY NOTE
RT Ventilator Management Note  Rachid Shore 60 y o  male MRN: [id number]  Unit/Bed#: [facility] 11 Encounter: [id number]      Daily Screen       1/25/2019 0915 1/26/2019 0746          Patient safety screen outcome[de-identified] Failed -      Not Ready for Weaning due to[de-identified] Underline problem not resolved Underline problem not resolved              Physical Exam:   Assessment Type: Assess only  Respiratory Pattern: Assisted  Chest Assessment: Chest expansion symmetrical  Bilateral Breath Sounds: Clear  Suction: ET Tube      Resp Comments: Pt continues on AC mode  No events noted this shift  Continues on same vent settings  BS essentially clear  SpO2 94%  Will continue full vent support

## 2019-01-27 NOTE — PROGRESS NOTES
NEPHROLOGY PROGRESS NOTE   Rell Moran 60 y o  male MRN: [id number]  [facility]: [id number]  Reason for Consult: PATRICK    ASSESSMENT AND PLAN:  Patient is 30-year-old male with CHF, EF 30%, status post bioprosthetic AVR initially presented after having episode of fever, becoming confused and significant dyspnea   Was transferred to [facility] [place] due to further do tried eating medical condition requiring intubation, multiple pressors due to significant hypotension, septic shock   We are consulted for [facility] management      anuric PATRICK, baseline serum creatinine 0 7 to 0 8  -PATRICK most likely secondary to ischemic/septic ATN, rhabdomyolysis  -due to progressive worsening renal failure, concern for mild volume overload, worsening acidosis, patient was started on CVVHD on 1/24/19    CVVHD was changed to CVVH pre filter  Patient seen and examined on CVVH at 6:20 a m  [facility] Due to recurrent filter clotting issues, patient was started on pre filter heparin drip yesterday  Last PTT 28 and despite being on heparin drip patient had one time filter clotting again overnight   -will increase heparin drip to 500 units/hour  With goal PTT in mid 40s     -FF 15  -Goal UF removal 0 to 50 mL/hour as blood pressure tolerated    -continue to closely monitor intake and output   Avoid nephrotoxins or NSAIDs  -urinalysis showed very concentrated sample, greater than 3+ proteinuria, innumerable RBCs, filled obscured four WBCs/bacteria   Positive nitrate   -recent CT scan without contrast shows no hydronephrosis, otherwise unremarkable kidneys  -CK level continued to improve at 3180 from peak level 60,717     Septic shock/component of cardiogenic shock, MSSA bacteremia  -CHON did not show evidence of valvular vegetations    -antibiotic as per ICU team   Currently remains on Levophed, Milrinone and vasopressin      Metabolic acidosis  -lactic acid improved to 1 3   -likely secondary to worsened renal failure in the setting of septic shock   -bicarb is overall improved and stable   Continue to monitor closely on CRRT   Now remains off bicarb drip      Mild volume overload,  -UF removal as above as blood pressure tolerated  -CHF with EF 30%   Close cardiology follow-up  -urine output remains poor      History of aortic stenosis, status post bioprosthetic AVR in 2014  VDRF, 40% FiO2     Discussed with ICU team    SUBJECTIVE:  Patient seen and examined at bedside  He remains critical urine remains on multiple vasopressors  Now started on heparin pre filter drip  Patient had one time filter clotting despite being on heparin drip  Suri Thakur Urine output remains poor       OBJECTIVE:  Current Weight: Weight - Scale: (!) 145 kg (319 lb 3 6 oz)  Vitals:    01/27/19 0454   BP:    Pulse: 98   Resp: 20   Temp:    SpO2: 93%       Intake/Output Summary (Last 24 hours) at 01/27/19 9406  Last data filed at 01/27/19 0600   Gross per 24 hour   Intake           3867 2 ml   Output             4283 ml   Net           -415 8 ml       Physical Examination:  General:  Lying in bed, intubated   Eyes:  Mild conjunctival pallor present  ENT:  ET tube present  Neck:  Supple  Respiratory:  Bilateral coarse breath sound present  CVS:  S1, S2 present  GI:  Soft, nondistended  CNS:  Intubated, sedated  Extremities:  Trace edema in legs  Skin:  No new rash in legs    Medications:    Current Facility-Administered Medications:     acetaminophen (TYLENOL) oral suspension 650 mg, 650 mg, Oral, Q6H PRN, Dilshad Ayers MD, 650 mg at 01/24/19 1946    albuterol inhalation solution 2 5 mg, 2 5 mg, Nebulization, Q4H PRN, Guzman Izquierdo PA-C    bisacodyl (DULCOLAX) rectal suppository 10 mg, 10 mg, Rectal, Daily PRN, Guzman Izquierdo PA-C    calcium gluconate 1 g in sodium chloride 0 9 % 100 mL IVPB, 1 g, Intravenous, Once, Yuli Bullock MD    ceFAZolin (ANCEF) IVPB (premix) 2,000 mg, 2,000 mg, Intravenous, Q12H, Dominique Walker PA-C, Stopped at 01/26/19 2100    chlorhexidine (PERIDEX) 0 12 % oral rinse 15 mL, 15 mL, Swish & Spit, Q12H [facility], HERBERT Estes, 15 mL at 01/26/19 2000    dexmedetomidine (PRECEDEX) 200 mcg in sodium chloride 0 9 % 50 mL infusion, 0 1-0 7 mcg/kg/hr, Intravenous, Titrated, Rhianna Low MD, Last Rate: 25 4 mL/hr at 01/27/19 0423, 0 7 mcg/kg/hr at 01/27/19 0423    famotidine (PEPCID) oral suspension 20 mg, 20 mg, Oral, Daily, Annia Mejia PA-C, 20 mg at 01/26/19 0915    fentaNYL 1250 mcg in sodium chloride 0 9% 125mL drip, 50 mcg/hr, Intravenous, Titrated, KATHY Mathias, Last Rate: 5 mL/hr at 01/27/19 0617, 50 mcg/hr at 01/27/19 0617    fentanyl citrate (PF) 100 MCG/2ML 50 mcg, 50 mcg, Intravenous, Q1H PRN, Rhianna Low MD, 50 mcg at 01/26/19 2312    heparin (porcine) 25,000 units in 250 mL infusion (premix), 400 Units/hr, Intravenous, Continuous, Endy Gautam MD, Last Rate: 4 mL/hr at 01/26/19 2240, 400 Units/hr at 01/26/19 2240    hydrocortisone sodium succinate (PF) (Solu-CORTEF) injection 50 mg, 50 mg, Intravenous, Q6H [facility], Daniel Mahoney DO, 50 mg at 01/27/19 0515    insulin lispro (HumaLOG) 100 units/mL subcutaneous injection 5-25 Units, 5-25 Units, Subcutaneous, Q6H [facility], 10 Units at 01/27/19 0513 **AND** Fingerstick Glucose (POCT), , , Q6H, HERBERT Estes    Milrinone Lactate in Dextrose (PRIMACOR) 20 MG/100 ML infusion (premix), 0 13 mcg/kg/min, Intravenous, Continuous, Javad May DO, Last Rate: 5 7 mL/hr at 01/27/19 0446, 0 13 mcg/kg/min at 01/27/19 0446    norepinephrine (LEVOPHED) 8 mg (DOUBLE CONCENTRATION) IV in sodium chloride 0 9% 250 mL, 1-30 mcg/min, Intravenous, Titrated, HERBERT Estes, Last Rate: 9 4 mL/hr at 01/27/19 0454, 5 mcg/min at 01/27/19 0454    NxStage K 4/Ca 3 dialysis solution (RFP-401) 20,000 mL, 20,000 mL, Dialysis, Continuous, Giorgi Jackman MD, Last Rate: 0 mL/hr at 01/25/19 1510, 20,000 mL at 01/27/19 0044    nystatin (MYCOSTATIN) powder, , Topical, BID, Lorice Mercy Leopoldo Grumbling, PA-C    polyethylene glycol (MIRALAX) packet 17 g, 17 g, Oral, Daily, KATHY Mathias, 17 g at 01/26/19 1158    senna 8 8 mg/5 mL oral syrup 8 8 mg, 8 8 mg, Oral, HS, Manisha Tolentino PA-C, 8 8 mg at 01/25/19 2300    vasopressin (PITRESSIN) 20 Units in sodium chloride 0 9 % 100 mL infusion, 0 04 Units/min, Intravenous, Continuous, KATHY Mathias, Last Rate: 12 mL/hr at 01/27/19 0233, 0 04 Units/min at 01/27/19 0233    Laboratory Results:    Results from last 7 days  Lab Units 01/27/19  0555 01/27/19  0512 01/27/19  0000 01/26/19  1914 01/26/19  1202 01/26/19  0601 01/26/19  0515 01/26/19  0000 01/25/19  1801  01/25/19  0410 01/24/19  1613 01/24/19  0502  01/23/19  2303  01/23/19  1600   WBC Thousand/uL  --  15 47*  --   --   --   --  13 48*  --   --   --  11 12* 12 29* 12 10*  --  12 27*  --  16 01*   HEMOGLOBIN g/dL  --  11 1*  --   --   --   --  11 8*  --   --   --  13 5 13 8 13 3  --  14 4  --  14 6   HEMATOCRIT %  --  35 1*  --   --   --   --  36 8  --   --   --  42 5 44 2 43 4  --  46 4  --  47 0   PLATELETS Thousands/uL  --  46*  --   --   --   --  46*  --   --   --  57* 64* 81*  --  83*  --  112*   POTASSIUM mmol/L  --  4 1 4 0 4 1 3 9 4 2  --  3 9 4 7  < > 3 7 3 9 3 7  < >  --   < > 3 9   CHLORIDE mmol/L  --  107 108 107 105 107  --  105 106  < > 104 107 106  < >  --   < > 99*   CO2 mmol/L  --  22 24 22 23 21  --  23 21  < > 20* 17* 22  < >  --   < > 25   BUN mg/dL  --  33* 33* 32* 35* 36*  --  34* 39*  < > 45* 52* 45*  < >  --   < > 34*   CREATININE mg/dL  --  2 54* 2 58* 2 68* 2 68* 2 89*  --  2 88* 3 14*  < > 3 64* 4 03* 3 70*  < >  --   < > 2 63*   CALCIUM mg/dL  --  8 3 8 2* 8 2* 8 4 8 2*  --  8 1* 8 2*  < > 7 5* 6 6* 7 0*  < >  --   < > 8 0*   MAGNESIUM mg/dL 2 1  --  2 1 2 1 2 1 2 1  --  2 0 2 1  < > 2 0 2 2 2 0  --   --   --  2 0   PHOSPHORUS mg/dL 2 5*  --  2 5* 2 2* 2 4* 2 4*  --  2 5* 2 3*  < > 3 1 4 6* 4 4  < >  --   --   --    < > = values in this interval not displayed  Results for orders placed during the hospital encounter of 01/24/19   XR chest portable    Narrative CHEST     INDICATION:   ETT check  COMPARISON:  January 24, 2019  EXAM PERFORMED/VIEWS:  XR CHEST PORTABLE      FINDINGS:      Endotracheal tube is present, in satisfactory position with its tip above the level of the raad  Enteric tube is present with its tip extending below the left hemidiaphragm  Right and left internal jugular central venous catheters are noted  Sternotomy wires are present  Arch is mildly enlarged  Prosthetic cardiac valve is noted  Mild pulmonary vascular congestive changes are seen  No significant pleural effusion  No pneumothorax  No acute osseous abnormality  Impression Expected positioning of endotracheal and enteric tubes  A right internal jugular central venous catheter has been changed since previous examination with a new one being larger in caliber  No pneumothorax  Workstation performed: [id number]       No results found for this or any previous visit  No results found for this or any previous visit  No results found for this or any previous visit  No results found for this or any previous visit  No results found for this or any previous visit  Portions of the record may have been created with voice recognition software  Occasional wrong word or "sound a like" substitutions may have occurred due to the inherent limitations of voice recognition software  Read the chart carefully and recognize, using context, where substitutions have occurred

## 2019-01-27 NOTE — PROGRESS NOTES
Progress Note - Critical Care   [address]aleb [address] 60 y o  male MRN: [id number]  [facility]: [id number]    Assessment:   Principal Problem:    Cardiogenic shock ([facility] )  Active Problems:    Increased BMI    NSTEMI (non-ST elevated myocardial infarction) ([facility] )    PATRICK (acute kidney injury) ([facility] )    Stress-induced cardiomyopathy    Acute respiratory failure with hypoxia (HCC)    History of aortic valve replacement with bioprosthetic valve    Rhabdomyolysis    Atrial fibrillation (HCC)    Septic shock (HCC)    Gram-positive bacteremia    Transaminitis    Thrombocytopenia (HCC)  Resolved Problems:    Acute encephalopathy    Plan:     Neuro:   · Toxic metabolic encephalopathy  · Sedation: Precedex  · RASS goal: 0 Alert and Calm  · Analgesia with: fentanyl gtt with PRN fentanyl (3/24hrs)  · Delirium Precautions: CAM ICU daily, regulate sleep/wake cycle, avoid benzos and opiates as able  · Trend neuro exam    CV:   · Multifactorial shock- Cardiogenic/septic  · Cardiac infusions: Levophed, 5 mcg/min, Vasopressin, 0 04 Units/min, Milrinone 0 13  · Continue hydocortisone 50mg Q6  · Wean as able  · MAP goal > 65   · Keep Arterial line  · New onset afib with RVR  · Rhythm: Atrial Fibrillation  · No AC secondary to thrombocytopenia   · Follow rhythm on telemetry  · NSTEMI type 2 secondary to shock state  · ASA/statin/BBlocker on hold secondary to shock/thrombocytopenia   · Bioprostetic AVR  Lung:   · Acute hypoxic resp failure  · SBT plan: Trial today  · Chlorhexidine ordered: yes  · SpO2 goal >92%    GI:   · Shock liver vs hepatic congestion  · Nearly resolved   · PPI Not Indicated  · Bowel regiment Colace    FEN:   · Nutrition/diet plan: TF at goal  · Replete electrolytes with goals: K >4 0, Mag >2 0, and Phos >3 0    :   · Acute renal failure on CKD/ rhabdo on CVVH  · CVVH running -25 with heparin infusion secondary to filter clotting goal PTT 40 per renal  · Indwelling Turner present: no   · Trend UOP and BUN/creat  · Bladder scan Q shift for renal recovery  · CK improving    ID:   · Source of infection: MSSA bacteremia, unclear source possible upper back wounds  · Abx ordered: Ancef  · CHON negative for vegetation, continues with persistent bacteremia  · Day # 4 of unclear course timing   · ID following   · Trend temps and WBC count    Heme:   · Thrombocyotpenia- likely consumptive   · Trend hgb and plts  · Transfuse as needed for goal hgb >7 0  · VTE Pharmacologic Prophylaxis: Reason for no pharmacologic prophylaxis thrombocytopenia  · VTE Mechanical Prophylaxis: sequential compression device    Endo:   · Hyperglycemia on steroids  · Glycemic control plan: SSI      MSK/Skin:  · Frequent turning and pressure off-loading  · Local wound care as needed    Disposition:  Continue ICU care    ______________________________________________________________________    HPI/24hr events: Overnight milrinone decreased and were able to wean levo  Opens eyes this morning and shakes head no to pain/nausea/shorntess of breath  CVVH filter continues to go down every 12 hours  ______________________________________________________________________  Physical Exam:  Sheikh Agitation Sedation Scale (RASS): Drowsy  Physical Exam   Constitutional: He appears well-nourished  No distress  HENT:   Head: Normocephalic and atraumatic  Eyes: Pupils are equal, round, and reactive to light  Neck: No JVD present  No tracheal deviation present  Cardiovascular: Normal rate  Irregular rhythm    Pulmonary/Chest: Effort normal and breath sounds normal  No respiratory distress  He has no wheezes  He has no rales  He exhibits no tenderness  Abdominal: Bowel sounds are normal  There is no tenderness  Obese   Musculoskeletal: He exhibits edema  Neurological:   Examined on sedation  Lethargic  Follows 1 step commands slowly  Nonfocal exam    Skin: Skin is warm and dry  ______________________________________________________________________  Temperature:   Temp (24hrs), Av 3 °F (36 8 °C), Min:97 5 °F (36 4 °C), Max:99 °F (37 2 °C)    Current      Vitals:    19 0500 19 0600 19 0700 19 0705   BP:       BP Location:       Pulse: 94 (!) 112 90    Resp: 18 (!) 23 21    Temp:       TempSrc:       SpO2: 93% 92% 93% 92%   Weight:       Height:         Arterial Line BP: 110/56       Weights:   IBW: 70 7 kg    Body mass index is 47 14 kg/m²  Weight (last 2 days)     None        Height: 5' 9" (175 3 cm)  Hemodynamic Monitoring:  N/A       Ventilator Settings:   Vent Mode: AC/VC    Settings  Resp Rate (BPM): 16 BPM  Vt (mL): 500 mL  FIO2 (%): 40 %  PEEP (cmH2O): 5 cmH2O  Flow (LPM): 60 L/min    Observed  Resp Rate (BPM): 20 BPM  VT (mL): 510  MV: 10 9  Peak Pressure (cmH2O): 29 cmH2O  Plateau Pressure (cm H2O): 19 cm H2O  I/E Ratio (Obs): 1/2 6   Static Compliance (mL/cmH20): 30 mL/cmH2O      No results found for: PHART, ZOH2LLE, PO2ART, CYW3VVD, H0YSIFMY, BEART, SOURCE    Intake and Outputs:  Intake/Output Summary (Last 24 hours) at 19 0720  Last data filed at 19 0700   Gross per 24 hour   Intake           3866 2 ml   Output             4102 ml   Net           -235 8 ml     I/O last 24 hours: In: 3866 2 [I V :1700 6; NG/GT:180; IV Piggyback:1134 6; Feedings:851]  Out: 4102 [Other:4102]    UOP: 0/hour     Nutrition:        Diet Orders            Start     Ordered    19 0848  Diet Enteral/Parenteral; Tube Feeding No Oral Diet; Nepro; Continuous; 52  Diet effective now     Question Answer Comment   Diet Type Enteral/Parenteral    Enteral/Parenteral Tube Feeding No Oral Diet    Tube Feeding Formula: Nepro    Bolus/Cyclic/Continuous Continuous    Tube Feeding Goal Rate (mL/hr): 52    RD to adjust diet per protocol?  Yes        19 0848          Labs:     Results from last 7 days  Lab Units 19  0512 19  0515 19  0410 01/24/19  1613 01/24/19  0502 01/23/19  2303 01/23/19  1600   WBC Thousand/uL 15 47* 13 48* 11 12* 12 29* 12 10* 12 27* 16 01*   HEMOGLOBIN g/dL 11 1* 11 8* 13 5 13 8 13 3 14 4 14 6   HEMATOCRIT % 35 1* 36 8 42 5 44 2 43 4 46 4 47 0   PLATELETS Thousands/uL 46* 46* 57* 64* 81* 83* 112*   NEUTROS PCT %  --  86* 86*  --   --   --  94*   MONOS PCT %  --  10 9  --   --   --  3*       Results from last 7 days  Lab Units 01/27/19  0512 01/27/19  0000 01/26/19  1914  01/26/19  0601  01/25/19  0410   SODIUM mmol/L 138 139 138  < > 136  < > 136   POTASSIUM mmol/L 4 1 4 0 4 1  < > 4 2  < > 3 7   CHLORIDE mmol/L 107 108 107  < > 107  < > 104   CO2 mmol/L 22 24 22  < > 21  < > 20*   BUN mg/dL 33* 33* 32*  < > 36*  < > 45*   CREATININE mg/dL 2 54* 2 58* 2 68*  < > 2 89*  < > 3 64*   CALCIUM mg/dL 8 3 8 2* 8 2*  < > 8 2*  < > 7 5*   ALBUMIN g/dL 2 2*  2 3*  --   --   --  2 2*  --  2 5*   ALK PHOS U/L 90  88  --   --   --  79  --  80   ALT U/L 59  64  --   --   --  159*  --  323*   AST U/L 277*  279*  --   --   --  551*  --  1,114*   < > = values in this interval not displayed      Results from last 7 days  Lab Units 01/27/19  0555 01/27/19  0000 01/26/19  1914   MAGNESIUM mg/dL 2 1 2 1 2 1   PHOSPHORUS mg/dL 2 5* 2 5* 2 2*        Results from last 7 days  Lab Units 01/27/19  0555 01/26/19  2100 01/25/19  0410 01/24/19  1613   INR  0 95  --  0 94 1 04   PTT seconds 28 27  --  46*       Results from last 7 days  Lab Units 01/24/19  2137 01/24/19  1831 01/24/19  1613   TROPONIN I ng/mL 36 20* 36 30* 34 90*       Results from last 7 days  Lab Units 01/24/19  1613 01/24/19  0502 01/24/19  0132   LACTIC ACID mmol/L 1 3 2 5* 2 2*     ABG:  Lab Results   Component Value Date    PHART 7 477 (H) 01/26/2019    TOL7VQT 29 6 (LL) 01/26/2019    PO2ART 71 7 (L) 01/26/2019    YAT4LNR 21 4 (L) 01/26/2019    BEART -1 1 01/26/2019    SOURCE Line, Arterial 01/26/2019     VBG:  Results from last 7 days  Lab Units 01/27/19  0555  01/26/19  0515 PH IVONNE  7 388  < >  --    PCO2 IVONNE mm Hg 36 8*  < >  --    PO2 IVONNE mm Hg 51 1*  < >  --    HCO3 IVONNE mmol/L 21 7*  < >  --    BASE EXC IVONNE mmol/L -2 8  < >  --    ABG SOURCE   --   --  Line, Arterial   < > = values in this interval not displayed  Imaging: No new     EKG: No new      Micro:  Blood Culture:   Lab Results   Component Value Date    BLOODCX Staphylococcus aureus (A) 01/24/2019    BLOODCX Staphylococcus aureus (A) 01/24/2019    BLOODCX Staphylococcus aureus (A) 01/23/2019     Urine Culture:   Lab Results   Component Value Date    URINECX 4105-2963 cfu/ml Gram Positive Cocci (A) 01/23/2019     Sputum Culture: No components found for: SPUTUMCX  Wound Culure: No results found for: [facility]     Lab Results   Component Value Date    BLOODCX Staphylococcus aureus (A) 01/24/2019    BLOODCX Staphylococcus aureus (A) 01/24/2019    BLOODCX Staphylococcus aureus (A) 01/23/2019    URINECX 6480-3095 cfu/ml Gram Positive Cocci (A) 01/23/2019    SPUTUMCULTUR Culture results to follow  01/24/2019       Allergies:    Allergies   Allergen Reactions    Penicillins Rash     However, has subsequently tolerated Cefazolin and Cefepime       Medications:   Scheduled Meds:  Current Facility-Administered Medications:  acetaminophen 650 mg Oral Q6H PRN Blane Wood MD    albuterol 2 5 mg Nebulization Q4H PRN Jill Acharya PA-C    bisacodyl 10 mg Rectal Daily PRN Jill HERBERT Acharya    calcium gluconate 1 G  100 mL IVPB 1 g Intravenous Once Niecy Brown MD    cefazolin 2,000 mg Intravenous Q12H Bruna Mejia PA-C Last Rate: Stopped (01/26/19 2100)   chlorhexidine 15 mL Swish & Spit Q12H [facility] Jill Acharya PA-C    dexmedetomidine 0 1-0 7 mcg/kg/hr Intravenous Titrated Blane Wood MD Last Rate: 0 7 mcg/kg/hr (01/27/19 0717)   famotidine 20 mg Oral Daily Bruna Mejia PA-C    fentaNYL 50 mcg/hr Intravenous Titrated KATHY Segura Last Rate: 50 mcg/hr (01/27/19 0617)   fentanyl citrate (PF) 50 mcg Intravenous Q1H PRN Angi Giron MD    heparin (porcine) 500 Units/hr Intravenous Continuous Caroline Camarena MD Last Rate: 500 Units/hr (01/27/19 0700)   hydrocortisone sodium succinate 50 mg Intravenous Q6H [address] Yrn Santos DO    insulin lispro 5-25 Units Subcutaneous Q6H [address] Priyank Ellington PA-C    milrinone [facility]) infusion 0 13 mcg/kg/min Intravenous Continuous Vinetta Grapes, DO Last Rate: 0 13 mcg/kg/min (01/27/19 0446)   norepinephrine 1-30 mcg/min Intravenous Titrated JIM LiC Last Rate: 5 mcg/min (01/27/19 0454)   NxStage K 4/Ca 3 20,000 mL Dialysis Continuous Caroline Camarena MD Last Rate: 0 mL (01/25/19 1510)   nystatin  Topical BID Masha Bahena PA-C    polyethylene glycol 17 g Oral Daily KATHY Mathias    senna 8 8 mg Oral HS Priyank Ellington PA-C    sodium phosphate 6 mmol Intravenous Once Caroline Camarena MD    vasopressin (PITRESSIN) in 0 9 % sodium chloride 100 mL 0 04 Units/min Intravenous Continuous KATHY Aguero Last Rate: 0 04 Units/min (01/27/19 0233)     Continuous Infusions:  dexmedetomidine 0 1-0 7 mcg/kg/hr Last Rate: 0 7 mcg/kg/hr (01/27/19 0717)   fentaNYL 50 mcg/hr Last Rate: 50 mcg/hr (01/27/19 0617)   heparin (porcine) 500 Units/hr Last Rate: 500 Units/hr (01/27/19 0700)   milrinone (PRIMACOR) infusion 0 13 mcg/kg/min Last Rate: 0 13 mcg/kg/min (01/27/19 0446)   norepinephrine 1-30 mcg/min Last Rate: 5 mcg/min (01/27/19 0454)   NxStage K 4/Ca 3 20,000 mL Last Rate: 0 mL (01/25/19 1510)   vasopressin (PITRESSIN) in 0 9 % sodium chloride 100 mL 0 04 Units/min Last Rate: 0 04 Units/min (01/27/19 0233)     PRN Meds:    acetaminophen 650 mg Q6H PRN   albuterol 2 5 mg Q4H PRN   bisacodyl 10 mg Daily PRN   fentanyl citrate (PF) 50 mcg Q1H PRN       Invasive lines and devices:   Invasive Devices     Central Venous Catheter Line            CVC Central Lines 01/24/19 Triple Left Internal jugular 2 days          Peripheral Intravenous Line Peripheral IV 01/23/19 Left Antecubital 3 days    Peripheral IV 01/23/19 Right Antecubital 3 days    Peripheral IV 01/24/19 Right Hand 3 days          Arterial Line            Arterial Line 01/24/19 Right Radial 2 days          Line            Temporary HD Catheter 2 days          Drain            NG/OG/Enteral Tube Orogastric 14 Fr Right mouth 3 days          Airway            ETT  Cuffed 7 5 mm 3 days                   Code Status: Level 1 - Full Code    Portions of the record may have been created with voice recognition software  Occasional wrong word or "sound a like" substitutions may have occurred due to the inherent limitations of voice recognition software  Read the chart carefully and recognize, using context, where substitutions have occurred      Mary Evans PA-C

## 2019-01-27 NOTE — PROGRESS NOTES
Cardiology Progress Note - Rell Moran 60 y o  male MRN: [id number]    [facility] Encounter: [id number]      Assessment:  Principal Problem:    Cardiogenic shock ([place] [id number] )  Active Problems:    Increased BMI    NSTEMI (non-ST elevated myocardial infarction) ([place] [address] )    PATRICK (acute kidney injury) ([place] [address] )    Stress-induced cardiomyopathy    Acute respiratory failure with hypoxia ([facility])    History of aortic valve replacement with bioprosthetic valve    Rhabdomyolysis    Atrial fibrillation (HCC)    Septic shock ([facility])    Gram-positive bacteremia    Transaminitis    Thrombocytopenia ([place] [address] )      Plan:  Patient continues on ventilator care  He is in atrial fibrillation with a controlled ventricular response  The nephrology note is appreciated  Patient has ongoing supportive care in reference to septic shock with probable component of cardiogenic shock  Transesophageal echo showed no signs of valvular vegetation  Patient continues on CVVH  Potassium today is 4 1 with a creatinine of 2 54  Caution with anticoagulation given thrombocytopenia  Subjective:   Patient seen and examined  No significant events overnight  Objective:     Vitals: Blood pressure 124/72, pulse 90, temperature 97 6 °F (36 4 °C), temperature source Oral, resp  rate 21, height 5' 9" (1 753 m), weight (!) 145 kg (319 lb 3 6 oz), SpO2 92 %  , Body mass index is 47 14 kg/m² , Orthostatic Blood Pressures      Most Recent Value   Blood Pressure  124/72 filed at 01/24/2019 1530   Patient Position - Orthostatic VS  Lying filed at 01/24/2019 1530      ,      Intake/Output Summary (Last 24 hours) at 01/27/19 3419  Last data filed at 01/27/19 0700   Gross per 24 hour   Intake           3866 2 ml   Output             4102 ml   Net           -235 8 ml       No significant arrhythmias seen on telemetry review    Atrial fibrillation with a controlled ventricular response      Physical Exam:    GEN: Rell Moran   NECK: supple, no carotid bruits, no JVD or HJR  HEART: normal rate, regular rhythm, normal S1 and S2, no murmurs, clicks, gallops or rubs   LUNGS: clear to auscultation bilaterally; no wheezes, rales, or rhonchi   ABDOMEN: normal bowel sounds, soft, no tenderness, no distention  EXTREMITIES:  edema      Labs & Results:    Admission on 01/24/2019   No results displayed because visit has over 200 results  Ct Chest Abdomen Pelvis Wo Contrast    Result Date: 1/23/2019  Narrative: CT CHEST, ABDOMEN AND PELVIS WITHOUT IV CONTRAST INDICATION:   Sepsis  COMPARISON:  None  TECHNIQUE: CT examination of the chest, abdomen and pelvis was performed without intravenous contrast   Axial, sagittal, and coronal 2D reformatted images were created from the source data and submitted for interpretation  Radiation dose length product (DLP) for this visit:  2085 mGy-cm   This examination, like all CT scans performed in the [facility], was performed utilizing techniques to minimize radiation dose exposure, including the use of iterative reconstruction and automated exposure control  Enteric contrast was administered  FINDINGS: CHEST LUNGS:  Patchy consolidative changes seen at the bilateral lung bases, consistent with atelectasis although infiltrate would have to be excluded clinically  Additional segmental atelectasis along the right major fissure     There is no tracheal or endobronchial lesion  PLEURA:  Unremarkable  HEART/GREAT VESSELS:  Atherosclerotic changes are noted in thoracic aorta noted  Status post valve replacement  MEDIASTINUM AND JAMAR:  Unremarkable  CHEST WALL AND LOWER NECK:   Unremarkable  ABDOMEN LIVER/BILIARY TREE:  Indeterminate 3 3 cm hypodensity in the left hepatic lobe  Additional vague 1 6 cm hypodense focus more inferiorly within the left hepatic lobe  GALLBLADDER:  No calcified gallstones  No pericholecystic inflammatory change  SPLEEN:  Unremarkable  PANCREAS:  Unremarkable  ADRENAL GLANDS:  Unremarkable   KIDNEYS/URETERS: One or more simple renal cyst(s) is noted  Otherwise unremarkable kidneys  No hydronephrosis  STOMACH AND BOWEL:  Unremarkable  APPENDIX:  No findings to suggest appendicitis  ABDOMINOPELVIC CAVITY:  No ascites or free intraperitoneal air  No lymphadenopathy  VESSELS:  Atherosclerotic changes are present  No evidence of aneurysm  PELVIS REPRODUCTIVE ORGANS:  Unremarkable for patient's age  URINARY BLADDER:  Turner catheter is in place, which limits evaluation    ABDOMINAL WALL/INGUINAL REGIONS:  Unremarkable  OSSEOUS STRUCTURES:  No acute fracture or destructive osseous lesion  Impression: Patchy consolidative changes at the bilateral lung bases, likely secondary to atelectasis, or alternatively, in the appropriate clinical context, acute infiltrate, possibly secondary to aspiration Workstation performed: [id number]     Xr Chest Portable    Result Date: 1/25/2019  Narrative: CHEST INDICATION:   ETT check  COMPARISON:  January 24, 2019  EXAM PERFORMED/VIEWS:  XR CHEST PORTABLE FINDINGS:  Endotracheal tube is present, in satisfactory position with its tip above the level of the raad  Enteric tube is present with its tip extending below the left hemidiaphragm  Right and left internal jugular central venous catheters are noted  Sternotomy wires are present  Arch is mildly enlarged  Prosthetic cardiac valve is noted  Mild pulmonary vascular congestive changes are seen  No significant pleural effusion  No pneumothorax  No acute osseous abnormality  Impression: Expected positioning of endotracheal and enteric tubes  A right internal jugular central venous catheter has been changed since previous examination with a new one being larger in caliber  No pneumothorax  Workstation performed: [id number]     Xr Chest Portable    Result Date: 1/24/2019  Narrative: CHEST INDICATION:   Line placement  COMPARISON:  1/24/2019   EXAM PERFORMED/VIEWS:  XR CHEST PORTABLE FINDINGS:  Right IJ with tip inferior aspect of the jugular vein--unchanged  Soft tissue capping at the right apex is smooth and was present before right IJ placement  New left IJ catheter tip is positioned appropriately with tip at the cavoatrial junction  Unchanged endotracheal tube, intact median sternotomy wires, and aortic valvular replacement  Unchanged enteric tube with side-port at the distal esophagus  Unchanged cardiomegaly and pulmonary vascular congestion  Platelike atelectasis in the left midlung and base  No new consolidations  Bones are unremarkable  Impression: 1  Appropriately positioned left IJ catheter  No pneumothorax on either side  2   Otherwise, unchanged lines and tubes  3   Persistent pulmonary vascular congestion and atelectasis in the lingula and left lower lobe  Workstation performed: [id number]     Xr Chest Portable    Result Date: 1/24/2019  Narrative: CHEST INDICATION:   line placement  COMPARISON:  1/24/2019 EXAM PERFORMED/VIEWS:  XR CHEST PORTABLE     Impression: FINDINGS/IMPRESSION: 1  Right IJ catheter tip is superficial in location projecting over the region of the inferior right jugular vein  Recommend replacement  2   Appropriately positioned endotracheal tube  3   Unchanged enteric tube with side-port at the distal esophagus  4   Unchanged low-grade alveolar edema and left midlung platelike atelectasis  5   Unchanged heart and megaly and pulmonary vascular congestion  The study was marked in EPIC for significant notification  Workstation performed: [id number]     Xr Chest Portable    Result Date: 1/24/2019  Narrative: CHEST INDICATION:   pneumothorax r/o  COMPARISON:  1/23/2019 EXAM PERFORMED/VIEWS:  XR CHEST PORTABLE FINDINGS:  Endotracheal tube tip appears to have been slightly retracted, measuring 5 6 cm above the raad  The tip is still below the level of the clavicles  Difference could be accentuated by increasingly lordotic position  Intact median sternotomy  wires  Aortic valvular prosthesis  New nasogastric tube with tip at the stomach and side-port at the distal esophagus  Heart shadow is enlarged but unchanged from prior exam  Central patchy opacities appear decreased  However, there is no platelike consolidation in the left midlung field  No pneumothorax or effusion  Bones are unremarkable  Impression: 1  Endotracheal tube tip appears slightly retracted from prior  However, tip remains below the clavicles in the thoracic trachea  2   Alveolar edema appears decreased, but there are new platelike opacities in the left midlung field with atelectasis  3   Enteric tube tip in the stomach with side-port in distal esophagus  If the tube is being used for decompression, positioning is adequate  If the tube is being used for feeding, recommend advancing by 12 cm  4   No pneumothorax  Workstation performed: [id number]     Xr Chest 1 View Portable    Result Date: 1/24/2019  Narrative: CHEST INDICATION:   Postintubation  COMPARISON:  1/23/2019 EXAM PERFORMED/VIEWS:  XR CHEST PORTABLE FINDINGS:  Appropriately positioned endotracheal tube tip measuring 3 cm from the raad  Intact median sternotomy wires  Aortic valvular replacement  Heart shadow is enlarged but unchanged from prior exam   Persistent pulmonary vascular engorgement and cephalization  Increasing patchy bilateral consolidations  No pneumothorax or effusion  Bones are unremarkable  Impression: 1  Increasing symmetric consolidative opacities  This most likely represents increasing alveolar edema  Correlate with BNP, if needed  Multifocal pneumonia is felt to be less likely given presence of pulmonary vascular congestion  2   Appropriately positioned endotracheal tube  Workstation performed: [id number]     Xr Chest 1 View Portable    Result Date: 1/23/2019  Narrative: CHEST INDICATION:   Chest pain, shortness of breath, and SVT   COMPARISON:  10/31/2014 EXAM PERFORMED/VIEWS:  XR CHEST PORTABLE FINDINGS:  Intact median sternotomy wires without dehiscence  Dilated cardiomyopathy  Pulmonary vascular engorgement and cephalization  Patchy central opacities suggesting alveolar edema  No pneumothorax or effusion  Bones are unremarkable  Impression: Findings of volume overload including pulmonary vascular congestion and mild alveolar edema  No effusions demonstrated  Workstation performed: [id number]     Ct Head Without Contrast    Result Date: 1/23/2019  Narrative: CT BRAIN - WITHOUT CONTRAST INDICATION:   abnormal mental status  COMPARISON:  None  TECHNIQUE:  CT examination of the brain was performed  In addition to axial images, coronal 2D reformatted images were created and submitted for interpretation  Radiation dose length product (DLP) for this visit:  966 93 mGy-cm   This examination, like all CT scans performed in the [facility], was performed utilizing techniques to minimize radiation dose exposure, including the use of iterative  reconstruction and automated exposure control  IMAGE QUALITY:  Image quality is limited through the posterior fossa  FINDINGS: PARENCHYMA:  There is no visible parenchymal mass or hemorrhage or midline shift  In the right frontal lobe, inferiorly, on image 27 series 2 there seems to be an area of diminished distinction between gray matter and white matter which might signify an  area of subacute ischemia, however, in this location it might simply be artifact  Suggest short interval follow-up in about 12 hours for reassessment  Overall, the finding is favored to be an artifact  An MRI, if possible, would presumably be much more definitive  VENTRICLES AND EXTRA-AXIAL SPACES:  Normal for the patient's age  VISUALIZED ORBITS AND PARANASAL SINUSES:  There is prominence of the intraorbital vessels bilaterally  These may simply be varicosities  There does not appear to be any evidence of acute pathology in the cavernous sinus  Sinuses are clear   CALVARIUM AND EXTRACRANIAL SOFT TISSUES:  No acute findings  Small amount of right mastoid fluid noted incidentally  Impression: Area of low density/diminished gray-white distinction in the inferior right frontal lobe which is probably artifactual, however, 12 hour follow-up reassessment suggested as this could be subacute ischemia  No hemorrhage  Dilated intraorbital vessels bilaterally perhaps venous varicosities  No gross evidence of pathology of the cavernous sinus  Workstation performed: [id number]       EKG personally reviewed by Lola Porter MD      Counseling / Coordination of Care  Total floor / unit time spent today 30 minutes  Greater than 50% of total time was spent with the patient and / or family counseling and / or coordination of care

## 2019-01-27 NOTE — PLAN OF CARE
GENITOURINARY - ADULT     Urinary catheter remains patent Completed          GENITOURINARY - ADULT     Maintains or returns to baseline urinary function Not Progressing          CARDIOVASCULAR - ADULT     Maintains optimal cardiac output and hemodynamic stability Progressing     Absence of cardiac dysrhythmias or at baseline rhythm Progressing        INFECTION - ADULT     Absence or prevention of progression during hospitalization Progressing        Knowledge Deficit     Patient/family/caregiver demonstrates understanding of disease process, treatment plan, medications, and discharge instructions Progressing        METABOLIC, FLUID AND ELECTROLYTES - ADULT     Electrolytes maintained within normal limits Progressing     Fluid balance maintained Progressing        Nutrition/Hydration-ADULT     Nutrient/Hydration intake appropriate for improving, restoring or maintaining nutritional needs Progressing        PAIN - ADULT     Verbalizes/displays adequate comfort level or baseline comfort level Progressing        Potential for Falls     Patient will remain free of falls Progressing        Prexisting or High Potential for Compromised Skin Integrity     Skin integrity is maintained or improved Progressing        SAFETY ADULT     Maintain or return to baseline ADL function Progressing     Maintain or return mobility status to optimal level Progressing     Patient will remain free of falls Progressing        SAFETY,RESTRAINT: NV/NON-SELF DESTRUCTIVE BEHAVIOR     Remains free of harm/injury (restraint for non violent/non self-detsructive behavior) Progressing     Returns to optimal restraint-free functioning Progressing

## 2019-01-27 NOTE — PROGRESS NOTES
Progress Note - Infectious Disease   Deepak Reeves 60 y o  male MRN: [id number]  Unit/Bed#: [facility] 11 Encounter: [id number]      Impression/Recommendations:  1  Septic shock   Fever, tachycardia, leukocytosis, hypotension   Also with component of cardiogenic shock  Likely precipitated by MSSA bacteremia   No other clear evidence of acute infection identified   Fevers, leukocytosis, procalcitonin  much improved    Patient remains on multiple vasopressors, which are slowly being weaned down      -antibiotic plan as below  -monitor temperatures and hemodynamics  -check CBC in a m   -supportive care     2  MSSA bacteremia   Strong possibility of endocarditis in the setting of bioprosthetic AVR   CHON with no evidence of valvular vegetations   Initial portal of entry may have been related to multiple upper back wounds, which appear scabbed over with no overt evidence of acute infection on exam   CT chest/abdomen/pelvis with no other evidence of acute infection   Repeat blood cultures drawn on 01/24 remain positive  Again 1 of 2 blood cultures from 01/26 are positive      -continue with renally dosed IV cefazolin as patient continues to clinically improve  -follow-up repeat blood cultures from 01/27  If remain positive, will plan to switch to IV nafcillin   -may require further imaging including spinal imaging, however, no focal neurologic deficits on exam      3  History of bioprosthetic AVR   Secondary to degenerative AS   Placed in July 2014       4  Acute kidney injury   Likely ischemic/septic ATN   Currently on CRRT   Nephrology following closely   Will dose adjust antibiotic with CRRT      5  Acute hypoxic respiratory failure   Likely in the setting of septic shock as well as acute systolic cardiomyopathy/volume overload   Recent CT chest with no evidence to suggest pulmonary infection      -wean off ventilator as able  -monitor secretions  -monitor O2 requirements     6  Elevated CPK    Unclear etiology   Patient with no reported recent fall or traumatic injury   CPK level continues to improve   Continue to monitor for now      7  Abnormal LFTs   Likely secondary to shock state   LFTs much improved          Antibiotics:  Cefazolin     Discussed above plan with critical care service  Subjective:  Currently sedated and intubated  Off sedation, patient is following commands and moving all of his extremities  No fevers  Remains on CRRT  Vasopressors slowly weaning down  Objective:  Vitals:  Temp:  [96 8 °F (36 °C)-98 6 °F (37 °C)] 97 6 °F (36 4 °C)  HR:  [] 98  Resp:  [18-26] 21  BP: (113)/(65) 113/65  SpO2:  [87 %-96 %] 96 %  Temp (24hrs), Av 2 °F (36 8 °C), Min:96 8 °F (36 °C), Max:98 6 °F (37 °C)  Current: Temperature: 97 6 °F (36 4 °C)    Physical Exam:   General:  Currently sedated on ventilator  Eyes:  Conjunctive clear with no hemorrhages or effusions  Oropharynx:  ET tube in place  Neck:  Supple, no lymphadenopathy  Lungs:  Ventilated breath sounds  Cardiac:  Regular rate and rhythm, systolic click  Abdomen:  Soft, non-tender, non-distented  Extremities:  Stable edema  Skin:  No rashes, no ulcers  Neurological:  Sedated      Lab Results:  I have personally reviewed pertinent labs  Results from last 7 days  Lab Units 19  1148 19  0512 19  0000  19  0601  19  0410   POTASSIUM mmol/L 4 2 4 1 4 0  < > 4 2  < > 3 7   CHLORIDE mmol/L 106 107 108  < > 107  < > 104   CO2 mmol/L 25 22 24  < > 21  < > 20*   BUN mg/dL 32* 33* 33*  < > 36*  < > 45*   CREATININE mg/dL 2 41* 2 54* 2 58*  < > 2 89*  < > 3 64*   EGFR ml/min/1 73sq m 28 27 26  < > 23  < > 17   CALCIUM mg/dL 8 5 8 3 8 2*  < > 8 2*  < > 7 5*   AST U/L  --  277*  279*  --   --  551*  --  1,114*   ALT U/L  --  59  64  --   --  159*  --  323*   ALK PHOS U/L  --  90  88  --   --  79  --  80   < > = values in this interval not displayed      Results from last 7 days  Lab Units 19  0512 19  0515 19  0414 WBC Thousand/uL 15 47* 13 48* 11 12*   HEMOGLOBIN g/dL 11 1* 11 8* 13 5   PLATELETS Thousands/uL 46* 46* 57*       Results from last 7 days  Lab Units 01/26/19  0502 01/24/19  1255 01/24/19  1008 01/23/19  2301 01/23/19  1850 01/23/19  1730 01/23/19  1654 01/23/19  1600   BLOOD CULTURE  No Growth at 24 hrs  Staphylococcus aureus*  Staphylococcus aureus*  --   --   --   --  Staphylococcus aureus* Staphylococcus aureus*   SPUTUM CULTURE   --   --  1+ Growth of Staphylococcus aureus*  --   --   --   --   --    GRAM STAIN RESULT  Gram positive cocci in clusters Gram positive cocci in clusters  Gram positive cocci in clusters No Polys or Bacteria seen  --   --   --  Gram positive cocci in clusters Gram positive cocci in clusters   URINE CULTURE   --   --   --   --   --  9626-1423 cfu/ml Gram Positive Cocci*  --   --    INFLUENZA B PCR   --   --   --   --  None Detected  --   --   --    RSV PCR   --   --   --   --  None Detected  --   --   --    LEGIONELLA URINARY ANTIGEN   --   --   --  Negative  --   --   --   --        Imaging Studies:   I have personally reviewed pertinent imaging study reports and images in PACS  Chest x-ray shows stable vascular congestion  EKG, Pathology, and Other Studies:   I have personally reviewed pertinent reports

## 2019-01-27 NOTE — RESPIRATORY THERAPY NOTE
RT Ventilator Management Note  Maryjo Alfredo 60 y o  male MRN: [id number]  Unit/Bed#: [facility] 11 Encounter: [id number]      Daily Screen       1/26/2019 0746 1/27/2019 0705          Patient safety screen outcome[de-identified] - Failed      Not Ready for Weaning due to[de-identified] Underline problem not resolved Underline problem not resolved              Physical Exam:   Assessment Type: Assess only  General Appearance: Sleeping  Respiratory Pattern: Assisted  Chest Assessment: Chest expansion symmetrical  Bilateral Breath Sounds: Diminished  Suction: ET Tube  O2 Device: vent      Resp Comments: pt cont to blanca current AC settings pt had no incidents during the night, no changes at this time will cont to monitor

## 2019-01-28 LAB
ANION GAP SERPL CALCULATED.3IONS-SCNC: 7 MMOL/L (ref 4–13)
ANION GAP SERPL CALCULATED.3IONS-SCNC: 7 MMOL/L (ref 4–13)
ANION GAP SERPL CALCULATED.3IONS-SCNC: 8 MMOL/L (ref 4–13)
ANION GAP SERPL CALCULATED.3IONS-SCNC: 8 MMOL/L (ref 4–13)
ANISOCYTOSIS BLD QL SMEAR: PRESENT
APTT PPP: 29 SECONDS (ref 26–38)
APTT PPP: 32 SECONDS (ref 26–38)
APTT PPP: 33 SECONDS (ref 26–38)
ATRIAL RATE: 145 BPM
ATRIAL RATE: 148 BPM
BACTERIA BLD CULT: ABNORMAL
BASE EX.OXY STD BLDV CALC-SCNC: 60.9 % (ref 60–80)
BASE EX.OXY STD BLDV CALC-SCNC: 79.9 % (ref 60–80)
BASE EXCESS BLDV CALC-SCNC: -0.6 MMOL/L
BASE EXCESS BLDV CALC-SCNC: 1.6 MMOL/L
BASOPHILS # BLD MANUAL: 0 THOUSAND/UL (ref 0–0.1)
BASOPHILS NFR MAR MANUAL: 0 % (ref 0–1)
BUN SERPL-MCNC: 31 MG/DL (ref 5–25)
BUN SERPL-MCNC: 31 MG/DL (ref 5–25)
BUN SERPL-MCNC: 32 MG/DL (ref 5–25)
BUN SERPL-MCNC: 32 MG/DL (ref 5–25)
CA-I BLD-SCNC: 1.16 MMOL/L (ref 1.12–1.32)
CA-I BLD-SCNC: 1.17 MMOL/L (ref 1.12–1.32)
CA-I BLD-SCNC: 1.18 MMOL/L (ref 1.12–1.32)
CALCIUM SERPL-MCNC: 8.2 MG/DL (ref 8.3–10.1)
CALCIUM SERPL-MCNC: 8.3 MG/DL (ref 8.3–10.1)
CALCIUM SERPL-MCNC: 8.4 MG/DL (ref 8.3–10.1)
CALCIUM SERPL-MCNC: 8.4 MG/DL (ref 8.3–10.1)
CHLORIDE SERPL-SCNC: 105 MMOL/L (ref 100–108)
CHLORIDE SERPL-SCNC: 106 MMOL/L (ref 100–108)
CO2 SERPL-SCNC: 24 MMOL/L (ref 21–32)
CO2 SERPL-SCNC: 24 MMOL/L (ref 21–32)
CO2 SERPL-SCNC: 25 MMOL/L (ref 21–32)
CO2 SERPL-SCNC: 25 MMOL/L (ref 21–32)
CREAT SERPL-MCNC: 2.23 MG/DL (ref 0.6–1.3)
CREAT SERPL-MCNC: 2.29 MG/DL (ref 0.6–1.3)
CREAT SERPL-MCNC: 2.31 MG/DL (ref 0.6–1.3)
CREAT SERPL-MCNC: 2.32 MG/DL (ref 0.6–1.3)
EOSINOPHIL # BLD MANUAL: 0 THOUSAND/UL (ref 0–0.4)
EOSINOPHIL NFR BLD MANUAL: 0 % (ref 0–6)
ERYTHROCYTE [DISTWIDTH] IN BLOOD BY AUTOMATED COUNT: 16.7 % (ref 11.6–15.1)
GFR SERPL CREATININE-BSD FRML MDRD: 30 ML/MIN/1.73SQ M
GFR SERPL CREATININE-BSD FRML MDRD: 31 ML/MIN/1.73SQ M
GIANT PLATELETS BLD QL SMEAR: PRESENT
GLUCOSE SERPL-MCNC: 158 MG/DL (ref 65–140)
GLUCOSE SERPL-MCNC: 167 MG/DL (ref 65–140)
GLUCOSE SERPL-MCNC: 181 MG/DL (ref 65–140)
GLUCOSE SERPL-MCNC: 183 MG/DL (ref 65–140)
GLUCOSE SERPL-MCNC: 187 MG/DL (ref 65–140)
GLUCOSE SERPL-MCNC: 189 MG/DL (ref 65–140)
GLUCOSE SERPL-MCNC: 195 MG/DL (ref 65–140)
GRAM STN SPEC: ABNORMAL
HCO3 BLDV-SCNC: 24.8 MMOL/L (ref 24–30)
HCO3 BLDV-SCNC: 27.3 MMOL/L (ref 24–30)
HCT VFR BLD AUTO: 33.2 % (ref 36.5–49.3)
HGB BLD-MCNC: 10.7 G/DL (ref 12–17)
HOROWITZ INDEX BLDA+IHG-RTO: 40 MM[HG]
HOROWITZ INDEX BLDA+IHG-RTO: 40 MM[HG]
LYMPHOCYTES # BLD AUTO: 0.16 THOUSAND/UL (ref 0.6–4.47)
LYMPHOCYTES # BLD AUTO: 1 % (ref 14–44)
MAGNESIUM SERPL-MCNC: 2 MG/DL (ref 1.6–2.6)
MAGNESIUM SERPL-MCNC: 2.1 MG/DL (ref 1.6–2.6)
MCH RBC QN AUTO: 26.7 PG (ref 26.8–34.3)
MCHC RBC AUTO-ENTMCNC: 32.2 G/DL (ref 31.4–37.4)
MCV RBC AUTO: 83 FL (ref 82–98)
METAMYELOCYTES NFR BLD MANUAL: 4 % (ref 0–1)
MONOCYTES # BLD AUTO: 0.65 THOUSAND/UL (ref 0–1.22)
MONOCYTES NFR BLD: 4 % (ref 4–12)
NEUTROPHILS # BLD MANUAL: 14.69 THOUSAND/UL (ref 1.85–7.62)
NEUTS BAND NFR BLD MANUAL: 3 % (ref 0–8)
NEUTS SEG NFR BLD AUTO: 88 % (ref 43–75)
NRBC BLD AUTO-RTO: 0 /100 WBCS
O2 CT BLDV-SCNC: 13 ML/DL
O2 CT BLDV-SCNC: 9.8 ML/DL
PCO2 BLDV: 43.8 MM HG (ref 42–50)
PCO2 BLDV: 47.7 MM HG (ref 42–50)
PEEP RESPIRATORY: 5 CM[H2O]
PEEP RESPIRATORY: 5 CM[H2O]
PH BLDV: 7.37 [PH] (ref 7.3–7.4)
PH BLDV: 7.38 [PH] (ref 7.3–7.4)
PHOSPHATE SERPL-MCNC: 2.3 MG/DL (ref 2.7–4.5)
PHOSPHATE SERPL-MCNC: 2.4 MG/DL (ref 2.7–4.5)
PHOSPHATE SERPL-MCNC: 2.5 MG/DL (ref 2.7–4.5)
PHOSPHATE SERPL-MCNC: 2.5 MG/DL (ref 2.7–4.5)
PLATELET # BLD AUTO: 57 THOUSANDS/UL (ref 149–390)
PLATELET BLD QL SMEAR: ABNORMAL
PO2 BLDV: 32.7 MM HG (ref 35–45)
PO2 BLDV: 47.8 MM HG (ref 35–45)
POIKILOCYTOSIS BLD QL SMEAR: PRESENT
POTASSIUM SERPL-SCNC: 4 MMOL/L (ref 3.5–5.3)
POTASSIUM SERPL-SCNC: 4 MMOL/L (ref 3.5–5.3)
POTASSIUM SERPL-SCNC: 4.1 MMOL/L (ref 3.5–5.3)
POTASSIUM SERPL-SCNC: 4.2 MMOL/L (ref 3.5–5.3)
QRS AXIS: 91 DEGREES
QRS AXIS: 99 DEGREES
QRSD INTERVAL: 154 MS
QRSD INTERVAL: 154 MS
QT INTERVAL: 325 MS
QT INTERVAL: 333 MS
QTC INTERVAL: 510 MS
QTC INTERVAL: 518 MS
RBC # BLD AUTO: 4.01 MILLION/UL (ref 3.88–5.62)
RBC MORPH BLD: PRESENT
SODIUM SERPL-SCNC: 137 MMOL/L (ref 136–145)
SODIUM SERPL-SCNC: 138 MMOL/L (ref 136–145)
T WAVE AXIS: -76 DEGREES
T WAVE AXIS: -77 DEGREES
TARGETS BLD QL SMEAR: PRESENT
VENT AC: 16
VENT AC: 16
VENT- AC: AC
VENT- AC: AC
VENTRICULAR RATE: 145 BPM
VENTRICULAR RATE: 148 BPM
VT SETTING VENT: 500 ML
VT SETTING VENT: 500 ML
WBC # BLD AUTO: 16.14 THOUSAND/UL (ref 4.31–10.16)

## 2019-01-28 PROCEDURE — 85730 THROMBOPLASTIN TIME PARTIAL: CPT | Performed by: INTERNAL MEDICINE

## 2019-01-28 PROCEDURE — 93005 ELECTROCARDIOGRAM TRACING: CPT

## 2019-01-28 PROCEDURE — 99233 SBSQ HOSP IP/OBS HIGH 50: CPT | Performed by: INTERNAL MEDICINE

## 2019-01-28 PROCEDURE — 90945 DIALYSIS ONE EVALUATION: CPT

## 2019-01-28 PROCEDURE — 82805 BLOOD GASES W/O2 SATURATION: CPT | Performed by: PHYSICIAN ASSISTANT

## 2019-01-28 PROCEDURE — 99291 CRITICAL CARE FIRST HOUR: CPT | Performed by: INTERNAL MEDICINE

## 2019-01-28 PROCEDURE — 84100 ASSAY OF PHOSPHORUS: CPT | Performed by: INTERNAL MEDICINE

## 2019-01-28 PROCEDURE — 80048 BASIC METABOLIC PNL TOTAL CA: CPT | Performed by: INTERNAL MEDICINE

## 2019-01-28 PROCEDURE — 94003 VENT MGMT INPAT SUBQ DAY: CPT

## 2019-01-28 PROCEDURE — 93010 ELECTROCARDIOGRAM REPORT: CPT | Performed by: INTERNAL MEDICINE

## 2019-01-28 PROCEDURE — 82948 REAGENT STRIP/BLOOD GLUCOSE: CPT

## 2019-01-28 PROCEDURE — 83735 ASSAY OF MAGNESIUM: CPT | Performed by: INTERNAL MEDICINE

## 2019-01-28 PROCEDURE — 99232 SBSQ HOSP IP/OBS MODERATE 35: CPT | Performed by: INTERNAL MEDICINE

## 2019-01-28 PROCEDURE — 94760 N-INVAS EAR/PLS OXIMETRY 1: CPT

## 2019-01-28 PROCEDURE — 85007 BL SMEAR W/DIFF WBC COUNT: CPT | Performed by: PHYSICIAN ASSISTANT

## 2019-01-28 PROCEDURE — 82330 ASSAY OF CALCIUM: CPT | Performed by: INTERNAL MEDICINE

## 2019-01-28 PROCEDURE — 82805 BLOOD GASES W/O2 SATURATION: CPT | Performed by: INTERNAL MEDICINE

## 2019-01-28 PROCEDURE — 90945 DIALYSIS ONE EVALUATION: CPT | Performed by: INTERNAL MEDICINE

## 2019-01-28 PROCEDURE — 85027 COMPLETE CBC AUTOMATED: CPT | Performed by: PHYSICIAN ASSISTANT

## 2019-01-28 RX ORDER — MIDODRINE HYDROCHLORIDE 5 MG/1
5 TABLET ORAL
Status: DISCONTINUED | OUTPATIENT
Start: 2019-01-28 | End: 2019-01-29

## 2019-01-28 RX ORDER — METOPROLOL TARTRATE 5 MG/5ML
2.5 INJECTION INTRAVENOUS ONCE
Status: COMPLETED | OUTPATIENT
Start: 2019-01-28 | End: 2019-01-28

## 2019-01-28 RX ADMIN — HEPARIN SODIUM AND DEXTROSE 1000 UNITS/HR: 10000; 5 INJECTION INTRAVENOUS at 20:15

## 2019-01-28 RX ADMIN — FENTANYL CITRATE 50 MCG: 50 INJECTION, SOLUTION INTRAMUSCULAR; INTRAVENOUS at 02:38

## 2019-01-28 RX ADMIN — DEXMEDETOMIDINE 0.7 MCG/KG/HR: 100 INJECTION, SOLUTION, CONCENTRATE INTRAVENOUS at 06:03

## 2019-01-28 RX ADMIN — AMIODARONE HYDROCHLORIDE 150 MG: 50 INJECTION, SOLUTION INTRAVENOUS at 15:04

## 2019-01-28 RX ADMIN — HYDROCORTISONE SODIUM SUCCINATE 50 MG: 100 INJECTION, POWDER, FOR SOLUTION INTRAMUSCULAR; INTRAVENOUS at 23:33

## 2019-01-28 RX ADMIN — DEXMEDETOMIDINE 0.7 MCG/KG/HR: 100 INJECTION, SOLUTION, CONCENTRATE INTRAVENOUS at 20:50

## 2019-01-28 RX ADMIN — Medication 20000 ML: at 09:10

## 2019-01-28 RX ADMIN — DEXMEDETOMIDINE 0.7 MCG/KG/HR: 100 INJECTION, SOLUTION, CONCENTRATE INTRAVENOUS at 03:37

## 2019-01-28 RX ADMIN — CHLORHEXIDINE GLUCONATE 0.12% ORAL RINSE 15 ML: 1.2 LIQUID ORAL at 08:47

## 2019-01-28 RX ADMIN — Medication 20000 ML: at 15:55

## 2019-01-28 RX ADMIN — INSULIN LISPRO 5 UNITS: 100 INJECTION, SOLUTION INTRAVENOUS; SUBCUTANEOUS at 18:11

## 2019-01-28 RX ADMIN — METOPROLOL TARTRATE 2.5 MG: 1 INJECTION, SOLUTION INTRAVENOUS at 09:23

## 2019-01-28 RX ADMIN — SODIUM PHOSPHATE, MONOBASIC, MONOHYDRATE 6 MMOL: 276; 142 INJECTION, SOLUTION INTRAVENOUS at 21:30

## 2019-01-28 RX ADMIN — CALCIUM GLUCONATE 1 G: 98 INJECTION, SOLUTION INTRAVENOUS at 20:00

## 2019-01-28 RX ADMIN — CALCIUM GLUCONATE 1 G: 98 INJECTION, SOLUTION INTRAVENOUS at 07:26

## 2019-01-28 RX ADMIN — CEFAZOLIN SODIUM 2000 MG: 2 SOLUTION INTRAVENOUS at 20:50

## 2019-01-28 RX ADMIN — FENTANYL CITRATE 75 MCG/HR: 50 INJECTION, SOLUTION INTRAMUSCULAR; INTRAVENOUS at 06:04

## 2019-01-28 RX ADMIN — DEXMEDETOMIDINE 0.7 MCG/KG/HR: 100 INJECTION, SOLUTION, CONCENTRATE INTRAVENOUS at 23:10

## 2019-01-28 RX ADMIN — DEXMEDETOMIDINE 0.7 MCG/KG/HR: 100 INJECTION, SOLUTION, CONCENTRATE INTRAVENOUS at 18:19

## 2019-01-28 RX ADMIN — CALCIUM GLUCONATE 1 G: 98 INJECTION, SOLUTION INTRAVENOUS at 14:55

## 2019-01-28 RX ADMIN — NOREPINEPHRINE BITARTRATE 5 MCG/MIN: 1 INJECTION INTRAVENOUS at 15:56

## 2019-01-28 RX ADMIN — SODIUM PHOSPHATE, MONOBASIC, MONOHYDRATE 6 MMOL: 276; 142 INJECTION, SOLUTION INTRAVENOUS at 08:15

## 2019-01-28 RX ADMIN — HYDROCORTISONE SODIUM SUCCINATE 50 MG: 100 INJECTION, POWDER, FOR SOLUTION INTRAMUSCULAR; INTRAVENOUS at 12:20

## 2019-01-28 RX ADMIN — VASOPRESSIN 0.04 UNITS/MIN: 20 INJECTION INTRAVENOUS at 15:56

## 2019-01-28 RX ADMIN — CHLORHEXIDINE GLUCONATE 0.12% ORAL RINSE 15 ML: 1.2 LIQUID ORAL at 21:26

## 2019-01-28 RX ADMIN — DEXMEDETOMIDINE 0.7 MCG/KG/HR: 100 INJECTION, SOLUTION, CONCENTRATE INTRAVENOUS at 01:15

## 2019-01-28 RX ADMIN — CEFAZOLIN SODIUM 2000 MG: 2 SOLUTION INTRAVENOUS at 08:47

## 2019-01-28 RX ADMIN — VASOPRESSIN 0.04 UNITS/MIN: 20 INJECTION INTRAVENOUS at 07:08

## 2019-01-28 RX ADMIN — HYDROCORTISONE SODIUM SUCCINATE 50 MG: 100 INJECTION, POWDER, FOR SOLUTION INTRAMUSCULAR; INTRAVENOUS at 17:44

## 2019-01-28 RX ADMIN — DEXMEDETOMIDINE 0.7 MCG/KG/HR: 100 INJECTION, SOLUTION, CONCENTRATE INTRAVENOUS at 11:27

## 2019-01-28 RX ADMIN — FENTANYL CITRATE 50 MCG: 50 INJECTION, SOLUTION INTRAMUSCULAR; INTRAVENOUS at 06:41

## 2019-01-28 RX ADMIN — MIDODRINE HYDROCHLORIDE 5 MG: 5 TABLET ORAL at 16:12

## 2019-01-28 RX ADMIN — HYDROCORTISONE SODIUM SUCCINATE 50 MG: 100 INJECTION, POWDER, FOR SOLUTION INTRAMUSCULAR; INTRAVENOUS at 05:02

## 2019-01-28 RX ADMIN — DEXMEDETOMIDINE 0.7 MCG/KG/HR: 100 INJECTION, SOLUTION, CONCENTRATE INTRAVENOUS at 16:41

## 2019-01-28 RX ADMIN — INSULIN LISPRO 5 UNITS: 100 INJECTION, SOLUTION INTRAVENOUS; SUBCUTANEOUS at 06:22

## 2019-01-28 RX ADMIN — DEXMEDETOMIDINE 0.7 MCG/KG/HR: 100 INJECTION, SOLUTION, CONCENTRATE INTRAVENOUS at 13:33

## 2019-01-28 RX ADMIN — FENTANYL CITRATE 75 MCG/HR: 50 INJECTION, SOLUTION INTRAMUSCULAR; INTRAVENOUS at 23:45

## 2019-01-28 RX ADMIN — NYSTATIN: 100000 POWDER TOPICAL at 17:58

## 2019-01-28 RX ADMIN — CALCIUM GLUCONATE 1 G: 98 INJECTION, SOLUTION INTRAVENOUS at 01:14

## 2019-01-28 RX ADMIN — AMIODARONE HYDROCHLORIDE 1 MG/MIN: 50 INJECTION, SOLUTION INTRAVENOUS at 15:29

## 2019-01-28 RX ADMIN — SODIUM PHOSPHATE, MONOBASIC, MONOHYDRATE 6 MMOL: 276; 142 INJECTION, SOLUTION INTRAVENOUS at 14:54

## 2019-01-28 RX ADMIN — INSULIN LISPRO 10 UNITS: 100 INJECTION, SOLUTION INTRAVENOUS; SUBCUTANEOUS at 00:19

## 2019-01-28 RX ADMIN — SODIUM PHOSPHATE, MONOBASIC, MONOHYDRATE 6 MMOL: 276; 142 INJECTION, SOLUTION INTRAVENOUS at 01:14

## 2019-01-28 RX ADMIN — NYSTATIN: 100000 POWDER TOPICAL at 08:47

## 2019-01-28 RX ADMIN — Medication 20000 ML: at 01:32

## 2019-01-28 RX ADMIN — INSULIN LISPRO 5 UNITS: 100 INJECTION, SOLUTION INTRAVENOUS; SUBCUTANEOUS at 12:20

## 2019-01-28 RX ADMIN — Medication 20000 ML: at 23:25

## 2019-01-28 NOTE — PROGRESS NOTES
Cardiology Progress Note - Sander Langford 60 y o  male MRN: [id number]    [facility]: [id number]      Assessment:  Principal Problem:    Cardiogenic shock ([facility] )  Active Problems:    Increased BMI    NSTEMI (non-ST elevated myocardial infarction) ([facility] )    [facility]ICK (acute kidney [facility]) ([facility]    Stress-induced cardiomyopathy    Acute respiratory failure with hypoxia ([facility] )    History of aortic valve replacement with bioprosthetic valve    Rhabdomyolysis    Atrial fibrillation (HCC)    Septic shock (HCC)    Gram-positive bacteremia    Transaminitis    Thrombocytopenia (HCC)      Plan:  1  HFrEF- LVEF-30%, LVIDd-6 5 cm- Cardiomyopathy in the setting of Septic shock with possible component of cardiogenic shock- Biventricular failure  - Clinically does not appear to be hypervolemic and is warm  - On Milrinone 0 15 mcg/kg/min to aid with cardiac contractility  SVO2- on VBG this AM-79 9- can consider discontinuing Milrinone as now he is also in Afib with RVR  - on vasopressin for sepsis, off levo since this morning  - Echo 1/25- Upper normal LV size with LVEF-30%, Concentric hypertrophy, Dilated RV with reduced function, Dyssynergic septum  Bioprosthetic aortic valve- not very well visualized- mean gradient of 13  - His cardiomyopathy is predominantly sepsis mediated myocardial dysfunction     2  Septic shock- with gram positive bacteremia- MSSA- unclear source  - On Cefazolin and stress dose steroids  - CT chest 1/23- basilar consolidative lung changes not impressive to explain the degree of his septic shock and he is not high requirements on the vent itself  - CHON with no evidence of endocarditis  - Plan for eventual spine imaging given persistent bacteremia  He does have coin shaped excoriations on the upper back- wife reports scratching his upper back- possible seeding from  - On Vasopressin and Levophed to maintain MAP>65, off levo since this morning     3   Bioprosthetic AVR for severe degenerative AS- 25 mm [facility] in July 2014  - Gradients across the valve in the past have been around 21, current gradients are 13- CHON- no endocarditis     4  Troponin elevation  - Troponins elevated to above 40, this is in the setting of septic shock  - EKG with no ischemic changes  - Normal cath in 2014  - This is not a NSTEMI  Platelet count is also low- with ongoing Sepsis- heparin drip was discontinued    5  New onset Atrial fibrillation in the setting of sepsis  - Hold anticoagulation for now as well given dropping platelets  - Can consider IV Amiodarone bolus and drip given RVR  CHON with no evidence of LUIS FERNANDO thrombus     6  PATRICK- in the setting of septic shock  - on CRRT     7  Transaminitis          Subjective:   Patient seen and examined  On CRRT and Milrinone to aid with cardiac contractility  Pressor requirements have been improving  CHON on Friday with no evidence of endocarditis    Telemetry- Atrial fibrillation with ventricular rates of 130-150    Objective:     Vitals: Blood pressure 113/65, pulse (!) 118, temperature (!) 97 4 °F (36 3 °C), temperature source Oral, resp  rate 18, height 5' 9" (1 753 m), weight (!) 166 kg (366 lb 2 9 oz), SpO2 92 %  , Body mass index is 54 08 kg/m² ,   Orthostatic Blood Pressures      Most Recent Value   Blood Pressure  113/65 filed at 01/27/2019 1130   Patient Position - Orthostatic VS  Lying filed at 01/27/2019 1130            Intake/Output Summary (Last 24 hours) at 01/28/19 5854  Last data filed at 01/28/19 0900   Gross per 24 hour   Intake          3549 48 ml   Output             3816 ml   Net          -266 52 ml         Physical Exam:    GEN: Abel Almeida intubated and sedated, follows commands  HEENT: anicteric, mucous membranes moist  NECK: no jvd, no carotid bruits, right and left central lines  HEART: Irregular rhythm, tachycardic, normal S1 and S2, no murmurs, clicks, gallops or rubs   LUNGS: clear to auscultation bilaterally in the anterior lung fields   ABDOMEN: normal bowel sounds, soft, no tenderness, no distention  EXTREMITIES: peripheral pulses hard to feel; bluish discoloration of the toes, warm feet  NEURO: no focal findings   SKIN: coin shaped excoriations of the upper back      Current Facility-Administered Medications:     acetaminophen (TYLENOL) oral suspension 650 mg, 650 mg, Oral, Q6H PRN, Shana Rowland MD, 650 mg at 01/24/19 1946    albuterol inhalation solution 2 5 mg, 2 5 mg, Nebulization, Q4H PRN, Kayce Jorge PA-C    bisacodyl (DULCOLAX) rectal suppository 10 mg, 10 mg, Rectal, Daily PRN, Kayce Jorge PA-C    ceFAZolin (ANCEF) IVPB (premix) 2,000 mg, 2,000 mg, Intravenous, Q12H, Bonnye Gosselin, PA-C, Last Rate: 100 mL/hr at 01/28/19 0847, 2,000 mg at 01/28/19 0847    chlorhexidine (PERIDEX) 0 12 % oral rinse 15 mL, 15 mL, Swish [employer] [place], Q12H [facility] LOBITO Ellington PA-C, 15 mL at 01/28/19 0847    dexmedetomidine (PRECEDEX) 200 mcg in sodium chloride 0 9 % 50 mL infusion, 0 1-0 7 mcg/kg/hr, Intravenous, Titrated, Shana Rowland MD, Last Rate: 25 4 mL/hr at 01/28/19 0603, 0 7 mcg/kg/hr at 01/28/19 0603    fentaNYL 1250 mcg in sodium chloride 0 9% 125mL drip, 75 mcg/hr, Intravenous, Titrated, Vimal Melvin DO, Last Rate: 7 5 mL/hr at 01/28/19 0604, 75 mcg/hr at 01/28/19 0604    fentanyl citrate (PF) 100 MCG/2ML 50 mcg, 50 mcg, Intravenous, Q1H PRN, Shana Rowland MD, 50 mcg at 01/28/19 0641    heparin (porcine) 25,000 units in 250 mL infusion (premix), 1,000 Units/hr, Intravenous, Continuous, Jos Abel MD, Last Rate: 10 mL/hr at 01/28/19 0600, 1,000 Units/hr at 01/28/19 0600    hydrocortisone sodium succinate (PF) (Solu-CORTEF) injection 50 mg, 50 mg, Intravenous, Q6H [facility], Yrn Santos, , 50 mg at 01/28/19 0502    insulin lispro (HumaLOG) 100 units/mL subcutaneous injection 5-25 Units, 5-25 Units, Subcutaneous, Q6H [facility], 5 Units at 01/28/19 0622 **AND** Fingerstick Glucose (POCT), , , Q6H, Kayce Jorge PA-C   Milrinone Lactate in Dextrose (PRIMACOR) 20 MG/100 ML infusion (premix), 0 13 mcg/kg/min, Intravenous, Continuous, Olive Umanzor PA-C, Last Rate: 5 7 mL/hr at 01/27/19 1920, 0 13 mcg/kg/min at 01/27/19 1920    norepinephrine (LEVOPHED) 8 mg (DOUBLE CONCENTRATION) IV in sodium chloride 0 9% 250 mL, 1-30 mcg/min, Intravenous, Titrated, Lisa Santos PA-C, Stopped at 01/28/19 0800    NxStage K 4/Ca 3 dialysis solution (RFP-401) 20,000 mL, 20,000 mL, Dialysis, Continuous, Kirt Azevedo MD, Last Rate: 0 mL/hr at 01/25/19 1510, 20,000 mL at 01/28/19 0910    nystatin (MYCOSTATIN) powder, , Topical, BID, Lisa Santos PA-C    polyethylene glycol (MIRALAX) packet 17 g, 17 g, Oral, Daily, KATHY Mathias, 17 g at 01/26/19 1158    senna 8 8 mg/5 mL oral syrup 8 8 mg, 8 8 mg, Oral, HS, [place] LOBITO Ellington PA-C, 8 8 mg at 01/25/19 2300    sodium phosphate 6 mmol in sodium chloride 0 9 % 100 mL Infusion, 6 mmol, Intravenous, Once, Jose M Anderson MD, Last Rate: 50 mL/hr at 01/28/19 0815, 6 mmol at 01/28/19 0815    vasopressin (PITRESSIN) 20 Units in sodium chloride 0 9 % 100 mL infusion, 0 04 Units/min, Intravenous, Continuous, KATHY Mathias, Last Rate: 12 mL/hr at 01/28/19 0708, 0 04 Units/min at 01/28/19 0708    Labs & Results:    Lab Results   Component Value Date    CKTOTAL 3,180 (H) 01/27/2019    CKTOTAL 10,421 (H) 01/26/2019    CKTOTAL 38,028 (H) 01/25/2019    CKMB 14 9 (H) 01/27/2019    CKMB 38 7 (H) 01/26/2019    CKMB 139 8 (H) 01/25/2019    CKMBINDEX <1 0 01/27/2019    CKMBINDEX <1 0 01/26/2019    CKMBINDEX <1 0 01/25/2019    TROPONINI 36 20 (H) 01/24/2019    TROPONINI 36 30 (H) 01/24/2019    TROPONINI 34 90 (H) 01/24/2019       Lab Results   Component Value Date    GLUCOSE 92 04/09/2015    CALCIUM 8 2 (L) 01/28/2019     04/09/2015    K 4 0 01/28/2019    CO2 25 01/28/2019     01/28/2019    BUN 31 (H) 01/28/2019    CREATININE 2 32 (H) 01/28/2019       Lab Results   Component Value Date    WBC 16 14 (H) 01/28/2019    HGB 10 7 (L) 01/28/2019    HCT 33 2 (L) 01/28/2019    MCV 83 01/28/2019    PLT 57 (L) 01/28/2019       Results from last 7 days  Lab Units 01/27/19  0555   INR  0 95       Lab Results   Component Value Date    CHOL 146 07/18/2014    CHOL 145 02/21/2014     Lab Results   Component Value Date    HDL 41 06/02/2018    HDL 52 06/27/2017     Lab Results   Component Value Date    LDLCALC 57 06/02/2018    LDLCALC 129 (H) 06/27/2017     Lab Results   Component Value Date    TRIG 102 06/02/2018    TRIG 71 06/27/2017       Lab Results   Component Value Date    ALT 59 01/27/2019    ALT 64 01/27/2019     (H) 01/27/2019     (H) 01/27/2019

## 2019-01-28 NOTE — RESPIRATORY THERAPY NOTE
01/28/19 1524   Vent Information   Vent ID [id number]   Vent type     Vent Mode AC/VC   AC/VC Settings   Resp Rate (BPM) 16 BPM   Vt (mL) 500 mL   FIO2 (%) 40 %   PEEP (cmH2O) 5 cmH2O   Flow (LPM) 60 L/min   Trigger Sensitivity Pressure (cm H2O)  3 %   Humidification Heater   Heater Temperature (Set) 98 4 °F (36 9 °C)   AC/VC Actuals   Resp Rate (BPM) 25 BPM   VT (mL) 562   MV 13   MAP (cmH2O) 12 cmH2O   Peak Pressure (cmH2O) 25 cmH2O   I/E Ratio (Obs) 1:2 3   Heater Temperature (Obs) 98 8 °F (37 1 °C)   Static Compliance (mL/cmH20) 26 mL/cmH2O   Plateau Pressure (cm H2O) 24 cm H2O   AC/VC Alarms   High Peak Pressure (cmH2O) 45   High Resp Rate (BPM) 45 BPM   MV High (L/min) 20 L/min   MV Low (L/min) 4 L/min   Vt High (mL) 1000 mL   Vt Low (mL) 400 mL   AC/VC Apnea Settings   Resp Rate (BPM) 16 BPM   VT (mL) 500 mL   FIO2 (%) 100 %   Apnea Time (s) 20 S   Apnea Flow (L/min) 60 L/min   Maintenance   Alarm (pink) cable attached Yes   Resuscitation bag with peep valve at bedside Yes   Water bag changed No   Circuit changed No   IHI Ventilator Associated Pneumonia Bundle   Head of Bed Elevated HOB 30   ETT  Cuffed 7 5 mm   Placement Date/Time: 01/23/19 1953   Preoxygenated: Yes  Type: Cuffed  Tube Size: 7 5 mm  Laryngoscope:  Mac  Insertion attempts: 1  Secured at (cm): 24 at lip  Placed By: Jose R Guy at (cm) 25   Measured from [facility] by [employer] tube rosales   HI-LO Suction  Intermittent suction   RT Ventilator Management Note  Juan Alberto Blmae 60 y o  male MRN: [id number]  Unit/Bed#: [facility] Encounter: [id number]      Daily Screen       1/26/2019 0746 1/27/2019 0705          Patient safety screen outcome[de-identified] - Failed      Not Ready for Weaning due to[de-identified] Underline problem not resolved Underline problem not resolved              Physical Exam:   Assessment Type: Assess only  General Appearance: Sedated  Respiratory Pattern: Assisted  Chest Assessment: Chest expansion symmetrical  Bilateral Breath Sounds: Clear, Diminished  Cough: None      Resp Comments: pt  remained on A/C 16/500/40%O2/+5 Peep settings,,no wean today,resting comfortably, will continue to monitor

## 2019-01-28 NOTE — PROGRESS NOTES
Progress Note - Infectious Disease   Deepak Reeves 60 y o  male MRN: [id number]  Unit/Bed#: [facility] 11 Encounter: [id number]      Impression/Recommendations:  1  Septic shock   Fever, tachycardia, leukocytosis, hypotension   Also with component of cardiogenic shock  Likely precipitated by MSSA bacteremia   No other clear evidence of acute infection identified   Fevers, procalcitonin  much improved  WBC count remains elevated at 16  Remains on vasopressors but pressor requirements continue to improve      -antibiotic plan as below  -monitor temperatures and hemodynamics  -check CBC in a m   -supportive care     2  MSSA bacteremia   Strong possibility of endocarditis in the setting of bioprosthetic AVR   CHON with no evidence of valvular vegetations   Initial portal of entry may have been related to multiple upper back wounds, which appear scabbed over with no overt evidence of acute infection on exam   CT chest/abdomen/pelvis with no other evidence of acute infection   Repeat blood cultures drawn on 01/24 remain positive  Again 1 of 2 blood cultures from 01/26 are positive      -continue with renally dosed IV cefazolin as patient continues to clinically improve  -follow-up the most recent blood cultures drawn on 01/27 to ensure clearance of bacteremia  If remain positive, will plan to switch to IV nafcillin   -may require further imaging including spinal imaging, however, no focal neurologic deficits on exam      3  History of bioprosthetic AVR   Secondary to degenerative AS   Placed in July 2014  CHON with no evidence of endocarditis      4  Acute kidney injury   Likely ischemic/septic ATN   Currently on CRRT   Nephrology following closely   Will continue with dose adjusted antibiotic with CRRT      5   Acute hypoxic respiratory failure   Likely in the setting of septic shock as well as acute systolic cardiomyopathy/volume overload   Recent CT chest with no evidence to suggest pulmonary infection      -wean off ventilator as able  -monitor secretions  -monitor O2 requirements     6  Elevated CPK   Unclear etiology   Patient with no reported recent fall or traumatic injury  CPK level much improved      7  Abnormal LFTs   Likely secondary to shock state   LFTs much improved          Antibiotics:  Cefazolin     Discussed above plan with critical care service  Subjective:  Remains on very low-dose Levophed and vasopressin but overall improving pressor requirement  No fevers  Remains on CRRT  Looser bowel movements  No fevers  WBC count up to 16  Objective:  Vitals:  Temp:  [97 4 °F (36 3 °C)-98 2 °F (36 8 °C)] 98 2 °F (36 8 °C)  HR:  [] 108  Resp:  [17-28] 21  BP: (87)/(49) 87/49  SpO2:  [91 %-97 %] 93 %  Temp (24hrs), Av 7 °F (36 5 °C), Min:97 4 °F (36 3 °C), Max:98 2 °F (36 8 °C)  Current: Temperature: 98 2 °F (36 8 °C)    Physical Exam:   General:  Sedated, intubated  Eyes:  Conjunctive clear with no hemorrhages or effusions  Oropharynx:  ET tube in place  Neck:  Supple, no lymphadenopathy  Lungs:  Ventilated breath sounds  Cardiac:  Regular rate and rhythm, systolic click  Abdomen:  Soft, non-tender, non-distented  Extremities:  Stable edema  Skin:  No rashes, no ulcers  Neurological:  Sedated    Lab Results:  I have personally reviewed pertinent labs      Results from last 7 days  Lab Units 19  0615 19  2345 19  1801  19  0512  19  0601  19  0410   POTASSIUM mmol/L 4 0 4 0 4 2  < > 4 1  < > 4 2  < > 3 7   CHLORIDE mmol/L 105 106 106  < > 107  < > 107  < > 104   CO2 mmol/L 25 25 24  < > 22  < > 21  < > 20*   BUN mg/dL 31* 32* 33*  < > 33*  < > 36*  < > 45*   CREATININE mg/dL 2 32* 2 31* 2 48*  < > 2 54*  < > 2 89*  < > 3 64*   EGFR ml/min/1 73sq m 30 30 27  < > 27  < > 23  < > 17   CALCIUM mg/dL 8 2* 8 3 7 9*  < > 8 3  < > 8 2*  < > 7 5*   AST U/L  --   --   --   --  277*  279*  --  551*  --  1,114*   ALT U/L  --   --   --   --  59  64  --  159*  --  323*   ALK PHOS U/L  --   --   --   --  90  88  --  79  --  80   < > = values in this interval not displayed  Results from last 7 days  Lab Units 01/28/19  0500 01/27/19  0512 01/26/19  0515   WBC Thousand/uL 16 14* 15 47* 13 48*   HEMOGLOBIN g/dL 10 7* 11 1* 11 8*   PLATELETS Thousands/uL 57* 46* 46*       Results from last 7 days  Lab Units 01/26/19  0502 01/24/19  1255 01/24/19  1008 01/23/19  2301 01/23/19  1850 01/23/19  1730 01/23/19  1654 01/23/19  1600   BLOOD CULTURE  No Growth at 48 hrs  Staphylococcus aureus* Staphylococcus aureus*  Staphylococcus aureus*  --   --   --   --  Staphylococcus aureus* Staphylococcus aureus*   SPUTUM CULTURE   --   --  1+ Growth of Staphylococcus aureus*  --   --   --   --   --    GRAM STAIN RESULT  Gram positive cocci in clusters Gram positive cocci in clusters  Gram positive cocci in clusters No Polys or Bacteria seen  --   --   --  Gram positive cocci in clusters Gram positive cocci in clusters   URINE CULTURE   --   --   --   --   --  6593-0593 cfu/ml Gram Positive Cocci*  --   --    INFLUENZA B PCR   --   --   --   --  None Detected  --   --   --    RSV PCR   --   --   --   --  None Detected  --   --   --    LEGIONELLA URINARY ANTIGEN   --   --   --  Negative  --   --   --   --        Imaging Studies:   I have personally reviewed pertinent imaging study reports and images in PACS  Chest x-ray shows cardiomegaly with stable vascular congestion and mildly increased left basilar opacity    EKG, Pathology, and Other Studies:   I have personally reviewed pertinent reports

## 2019-01-28 NOTE — PLAN OF CARE
GENITOURINARY - ADULT     Maintains or returns to baseline urinary function Not Progressing          CARDIOVASCULAR - ADULT     Maintains optimal cardiac output and hemodynamic stability Progressing     Absence of cardiac dysrhythmias or at baseline rhythm Progressing        INFECTION - ADULT     Absence or prevention of progression during hospitalization Progressing        Knowledge Deficit     Patient/family/caregiver demonstrates understanding of disease process, treatment plan, medications, and discharge instructions Progressing        METABOLIC, FLUID AND ELECTROLYTES - ADULT     Electrolytes maintained within normal limits Progressing     Fluid balance maintained Progressing        Nutrition/Hydration-ADULT     Nutrient/Hydration intake appropriate for improving, restoring or maintaining nutritional needs Progressing        PAIN - ADULT     Verbalizes/displays adequate comfort level or baseline comfort level Progressing        Potential for Falls     Patient will remain free of falls Progressing        Prexisting or High Potential for Compromised Skin Integrity     Skin integrity is maintained or improved Progressing        SAFETY ADULT     Maintain or return to baseline ADL function Progressing     Maintain or return mobility status to optimal level Progressing     Patient will remain free of falls Progressing        SAFETY,RESTRAINT: NV/NON-SELF DESTRUCTIVE BEHAVIOR     Remains free of harm/injury (restraint for non violent/non self-detsructive behavior) Progressing     Returns to optimal restraint-free functioning Progressing

## 2019-01-28 NOTE — SOCIAL WORK
Pt intubated  CM spoke with pt wife, Vincenzo Garrison, via phone  CM introduced self and role with dcp  Pt resides with his wife and mother in law in a 2 story home with 5 BRENT and flight of stairs to bedroom  Pt has half bathroom on 2nd floor and full bathroom is on first  Pt independent with ADLs  As per yvonne she would help pt put his boots on  As per Vincenzo Heart pt is independent with ambulation but will use a RW as needed  Vincenzo Heart reports pt works full time and able to drive  Vincenzo Garrison reports she does not drive and is home but helps her mother  Pt has hx of SL VNA    No hx of STR, MH, or drug/alcohol abuse  Pharmacy - [facility] in [place]  No POA  CM to follow for dcp  CM reviewed d/c planning process including the following: identifying help at home, patient preference for d/c planning needs, Discharge Lounge, [facility] Meds to Bed program, availability of treatment team to discuss questions or concerns patient and/or family may have regarding understanding medications and recognizing signs and symptoms once discharged  CM also encouraged patient to follow up with all recommended appointments after discharge  Patient advised of importance for patient and family to participate in managing patients medical well being

## 2019-01-28 NOTE — RESPIRATORY THERAPY NOTE
RT Ventilator Management Note  Simi Osorio 60 y o  male MRN: [id number]  Unit/Bed#: [facility] 11 Encounter: [id number]      Daily Screen       1/26/2019 0746 1/27/2019 0705          Patient safety screen outcome[de-identified] - Failed      Not Ready for Weaning due to[de-identified] Underline problem not resolved Underline problem not resolved              Physical Exam:   Assessment Type: Assess only  General Appearance: Sedated  Respiratory Pattern: Assisted  Bilateral Breath Sounds: Coarse (Slightly )  Suction: ET Tube      Resp Comments: Pt continues on AC mode  Kailey well  Stable on vent  No events this evening  Will continue full vent support

## 2019-01-28 NOTE — PROGRESS NOTES
Progress Note - Critical Care   Sander Langford 60 y o  male MRN: [id number]  [facility] Encounter: [id number]    Attending Physician: Sid Phelps DO      ______________________________________________________________________  Assessment and Plan:   Principal Problem:    Cardiogenic shock ([facility] 75 )  Active Problems:    Increased BMI    NSTEMI (non-ST elevated myocardial infarction) ([facility] )    PATRICK (acute kidney injury) ([place] [address] )    Stress-induced cardiomyopathy    Acute respiratory failure with hypoxia ([place] [address] )    History of aortic valve replacement with bioprosthetic valve    Rhabdomyolysis    Atrial fibrillation ([facility] )    Septic shock (HCC)    Gram-positive bacteremia    Transaminitis    Thrombocytopenia (HCC)  Resolved Problems:    Acute encephalopathy        Neuro:   Toxic metabolic encephalopathy      -Sedation & Analgesia: Precedex and Fentanyl   Neuro checks as per unit protocol  Take precautions to prevent ICU delirium  Monitor GCS      CV:   Shock, likely multifactorial  -Currently on levophed 2 mcg/min, Vasopressin 0 04, Milrinone 0 13  -Continue Hydrocortisone  -Wean as tolerated, with MAP goal of > 65    New onset A fib with RVR   - A fib   - No AC 2/2 low plts @ 57 today  - continue tele monitoring    NSTEMI type 2 2/2 shock  - Continue to hold ASA, statin, BB 2/2 shock and thrombocytopenia    Continuous telemetry  Pulm:   Acute hypoxic respiratory failure  - Intubated on vent settings 16/500/40/5  - SPO2 goal > 92%        GI:   Shock liver vs hepatic congestion  · GI ppx: not indicated, pt on TF  · Bowel regimen colace    :   Acute renal failure on CKD and Rhabdo on CVVH  CVVH running even with heaprin infusion @500 units/hr per nephrowith goal of PTT 29  · UOP -anuric   · Monitor I&Os, net negative 268 5mL over past 24H  · Cr 2 32, GFR 30  · CK improving    F/E/N:  1   Fluids/Electrolytes  · Will continue  Electrolytes and Replete as needed       2   Nutrition: TF at goal       ID:   Unknown source of infection: MSSA bacteremia , likely from wounds on the back  Bcx pending  CHON negative for vegetation, continues with persistent bacteremia  Day # 5 of Ancef   Trend fever curve and WBC        Heme:   Thrombocytopenia likely consumptive, improved slightly  Hbg- 10 7, will continue to trend CBC  tranfuse as needed with goal >7  DVT ppx: Held 2/2 low plts  Continue SCDs    Endo:   Blood sugars not well controlled, on steroids  SSI     · Msk/Skin:   · Turn/position 2 hourly  · Wound care   · PT/OT when appropriate    Disposition:Continue ICU stay    Code Status: Level 1 - Full Code    Counseling / Coordination of Care  Total Critical Care time spent 30 minutes excluding procedures, teaching and family updates  ______________________________________________________________________    Chief Complaint: CVVH flite went down overnight    24 Hour Events:   Milirinone dose at 0 13, still on Levo and vasopressin     ______________________________________________________________________    Physical Exam:     Physical Exam   HENT:   Head: Normocephalic and atraumatic  Eyes: Pupils are equal, round, and reactive to light  Cardiovascular:   Tachycardia, irregular   Pulmonary/Chest: Breath sounds normal  He has no wheezes  He has no rales  Intubated on vent 16/500/40/5    Decreased BS   Abdominal: Soft  Bowel sounds are normal    Musculoskeletal: He exhibits edema  Neurological:   GCS  Sedated, barely follows commands  RASS -3 , reaction but not eye contact to voice   Skin: Skin is warm and dry  Vitals reviewed        ______________________________________________________________________  Vitals:    19 0430 19 0500 19 0600 19 0700   BP:       BP Location:       Pulse: 104 (!) 112 100 100   Resp: (!) 23 19 20 18   Temp: (!) 97 4 °F (36 3 °C)      TempSrc: Oral      SpO2: 93% 94% 93% 92%   Weight:       Height:           Temperature:   Temp (24hrs), Av 5 °F (36 4 °C), Min:96 8 °F (36 °C), Max:98 °F (36 7 °C)    Current Temperature: (!) 97 4 °F (36 3 °C)  Weights:   IBW: 70 7 kg    Body mass index is 47 14 kg/m²  Weight (last 2 days)     None        Hemodynamic Monitoring:  SvO2:        Non-Invasive/Invasive Ventilation Settings:  Respiratory    Lab Data (Last 4 hours)    None         O2/Vent Data (Last 4 hours)    None              No results found for: PHART, OQV5MYH, PO2ART, XFO8WWF, O4KRUPFM, BEART, SOURCE    Intake and Outputs:  I/O       01/26 0701 - 01/27 0700 01/27 0701 - 01/28 0700 01/28 0701 - 01/29 0700    I V  (mL/kg) 1696 6 (11 7) 1524 9 (10 5)     NG/ 40     IV Piggyback 1134 6 1020 6     Feedings 903 1052     Total Intake(mL/kg) 3914 2 (27) 3637 5 (25 1)     Other 4102 3906     Total Output 4102 3906      Net -187 8 -268 5             Unmeasured Stool Occurrence 4 x 5 x         UOP: anuric  Nutrition:        Diet Orders            Start     Ordered    01/26/19 0848  Diet Enteral/Parenteral; Tube Feeding No Oral Diet; Nepro; Continuous; 52  Diet effective now     Question Answer Comment   Diet Type Enteral/Parenteral    Enteral/Parenteral Tube Feeding No Oral Diet    Tube Feeding Formula: Nepro    Bolus/Cyclic/Continuous Continuous    Tube Feeding Goal Rate (mL/hr): 52    RD to adjust diet per protocol?  Yes        01/26/19 0848          Labs:     Results from last 7 days  Lab Units 01/28/19  0500 01/27/19  0512 01/26/19  0515 01/25/19  0410   WBC Thousand/uL 16 14* 15 47* 13 48* 11 12*   HEMOGLOBIN g/dL 10 7* 11 1* 11 8* 13 5   HEMATOCRIT % 33 2* 35 1* 36 8 42 5   PLATELETS Thousands/uL 57* 46* 46* 57*   NEUTROS PCT %  --  82* 86* 86*   MONOS PCT %  --  9 10 9   MONO PCT % 4  --   --   --        Results from last 7 days  Lab Units 01/28/19  0615 01/27/19  2345 01/27/19  1801  01/27/19  0512  01/26/19  0601  01/25/19  0410   POTASSIUM mmol/L 4 0 4 0 4 2  < > 4 1  < > 4 2  < > 3 7   CHLORIDE mmol/L 105 106 106  < > 107  < > 107  < > 104   CO2 mmol/L 25 25 24  < > 22  < > 21  < > 20* BUN mg/dL 31* 32* 33*  < > 33*  < > 36*  < > 45*   CREATININE mg/dL 2 32* 2 31* 2 48*  < > 2 54*  < > 2 89*  < > 3 64*   CALCIUM mg/dL 8 2* 8 3 7 9*  < > 8 3  < > 8 2*  < > 7 5*   ALK PHOS U/L  --   --   --   --  90  88  --  79  --  80   ALT U/L  --   --   --   --  59  64  --  159*  --  323*   AST U/L  --   --   --   --  277*  279*  --  551*  --  1,114*   < > = values in this interval not displayed  Results from last 7 days  Lab Units 01/28/19  0615 01/27/19  2345 01/27/19  1801   MAGNESIUM mg/dL 2 1 2 0 2 0     Lab Results   Component Value Date    PHOS 2 5 (L) 01/28/2019    PHOS 2 5 (L) 01/27/2019    PHOS 2 3 (L) 01/27/2019        Results from last 7 days  Lab Units 01/28/19  0500 01/27/19  2120 01/27/19  1306 01/27/19  0555  01/25/19  0410 01/24/19  1613   INR   --   --   --  0 95  --  0 94 1 04   PTT seconds 29 28 27 28  < >  --  46*   < > = values in this interval not displayed      0  Lab Value Date/Time   TROPONINI 36 20 (H) 01/24/2019 2137   TROPONINI 36 30 (H) 01/24/2019 1831   TROPONINI 34 90 (H) 01/24/2019 1613   TROPONINI >40 00 (HH) 01/24/2019 0502   TROPONINI >40 00 (HH) 01/24/2019 0207   TROPONINI 36 61 (HH) 01/23/2019 2251   TROPONINI 24 29 (HH) 01/23/2019 1850   TROPONINI 14 20 (HH) 01/23/2019 1600       Results from last 7 days  Lab Units 01/24/19  1613 01/24/19  0502 01/24/19  0132   LACTIC ACID mmol/L 1 3 2 5* 2 2*     ABG:  Lab Results   Component Value Date    PHART 7 477 (H) 01/26/2019    UPV8JLO 29 6 (LL) 01/26/2019    PO2ART 71 7 (L) 01/26/2019    XCQ6ICA 21 4 (L) 01/26/2019    BEART -1 1 01/26/2019    SOURCE Line, Arterial 01/26/2019     Imaging: CXR 1/26:Cardiomegaly with stable vascular congestion and mildly increased left basilar opacity, likely atelectasis/pleural effusion        Micro:  Lab Results   Component Value Date    BLOODCX No Growth at 24 hrs  01/26/2019    BLOODCX Staphylococcus aureus (A) 01/26/2019    BLOODCX Staphylococcus aureus (A) 01/24/2019    BLOODCX Staphylococcus aureus (A) 01/24/2019    URINECX 1766-6307 cfu/ml Gram Positive Cocci (A) 01/23/2019    SPUTUMCULTUR 1+ Growth of Staphylococcus aureus (A) 01/24/2019     Allergies:    Allergies   Allergen Reactions    Penicillins Rash     However, has subsequently tolerated Cefazolin and Cefepime     Medications:   Scheduled Meds:  Current Facility-Administered Medications:  acetaminophen 650 mg Oral Q6H PRN Alba Vogel MD    albuterol 2 5 mg Nebulization Q4H PRN Prashanth Kiser PA-C    bisacodyl 10 mg Rectal Daily PRN Prashanth Kiser PA-C    calcium gluconate 1 G  100 mL IVPB 1 g Intravenous Once Elisa El MD Last Rate: 1 g (01/28/19 0726)   cefazolin 2,000 mg Intravenous Q12H [place] Rashid Mejia PA-C Last Rate: Stopped (01/27/19 2100)   chlorhexidine 15 mL Swish & Spit Q12H [facility] Prashanth Kiser PA-C    dexmedetomidine 0 1-0 7 mcg/kg/hr Intravenous Titrated Alba Vogel MD Last Rate: 0 7 mcg/kg/hr (01/28/19 0603)   fentaNYL 75 mcg/hr Intravenous Titrated Doneta PolDO coty Last Rate: 75 mcg/hr (01/28/19 0604)   fentanyl citrate (PF) 50 mcg Intravenous Q1H PRN Alba Vogel MD    heparin (porcine) 1,000 Units/hr Intravenous Continuous Minetta Romberg, MD Last Rate: 1,000 Units/hr (01/28/19 0600)   hydrocortisone sodium succinate 50 mg Intravenous Q6H [facility] Yrn Santos DO    insulin lispro 5-25 Units Subcutaneous Q6H [facility] Priyank Ellington PA-C    milrinone [facility]) infusion 0 13 mcg/kg/min Intravenous Continuous Early HERBERT Fernandes Last Rate: 0 13 mcg/kg/min (01/27/19 1920)   norepinephrine 1-30 mcg/min Intravenous Titrated Prashanth Kiser PA-C Last Rate: 2 mcg/min (01/28/19 0430)   NxStage K 4/Ca 3 20,000 mL Dialysis Continuous Giorgi Jackman MD Last Rate: 0 mL (01/25/19 1510)   nystatin  Topical BID Prashanth Kiser PA-C    polyethylene glycol 17 g Oral Daily KATHY Mathias    senna 8 8 mg Oral HS Priyank Ellington PA-C    sodium phosphate 6 mmol Intravenous Once Elisa El MD vasopressin (PITRESSIN) in 0 9 % sodium chloride 100 mL 0 04 Units/min Intravenous Continuous KATHY Zamarripa Last Rate: 0 04 Units/min (01/28/19 0708)     Continuous Infusions:  dexmedetomidine 0 1-0 7 mcg/kg/hr Last Rate: 0 7 mcg/kg/hr (01/28/19 0603)   fentaNYL 75 mcg/hr Last Rate: 75 mcg/hr (01/28/19 0604)   heparin (porcine) 1,000 Units/hr Last Rate: 1,000 Units/hr (01/28/19 0600)   milrinone (PRIMACOR) infusion 0 13 mcg/kg/min Last Rate: 0 13 mcg/kg/min (01/27/19 1920)   norepinephrine 1-30 mcg/min Last Rate: 2 mcg/min (01/28/19 0430)   NxStage K 4/Ca 3 20,000 mL Last Rate: 0 mL (01/25/19 1510)   vasopressin (PITRESSIN) in 0 9 % sodium chloride 100 mL 0 04 Units/min Last Rate: 0 04 Units/min (01/28/19 0708)     PRN Meds:    acetaminophen 650 mg Q6H PRN   albuterol 2 5 mg Q4H PRN   bisacodyl 10 mg Daily PRN   fentanyl citrate (PF) 50 mcg Q1H PRN     VTE Pharmacologic Prophylaxis: None  VTE Mechanical Prophylaxis: sequential compression device  Invasive lines and devices: Invasive Devices     Central Venous Catheter Line            CVC Central Lines 01/24/19 Triple Left Internal jugular 3 days          Peripheral Intravenous Line            Peripheral IV 01/27/19 Left;Upper Arm less than 1 day    Peripheral IV 01/27/19 Right;Medial Antecubital less than 1 day          Arterial Line            Arterial Line 01/24/19 Right Radial 3 days          Line            Temporary HD Catheter 3 days          Drain            NG/OG/Enteral Tube Orogastric 14 Fr Right mouth 4 days          Airway            ETT  Cuffed 7 5 mm 4 days                     Portions of the record may have been created with voice recognition software  Occasional wrong word or "sound a like" substitutions may have occurred due to the inherent limitations of voice recognition software  Read the chart carefully and recognize, using context, where substitutions have occurred      Javier Smith MD

## 2019-01-28 NOTE — PROGRESS NOTES
Progress Note - Nephrology   Pauline Dos Santos 60 y o  male MRN: [id number]  Unit/Bed#: [facility] 11 Encounter: [id number]      Assessment / Plan:  1  anuric PATRICK, baseline serum creatinine 0 7 -0 8  -PATRICK most likely secondary to ischemic/septic ATN, rhabdomyolysis  -due to progressive worsening renal failure, concern for mild volume overload, worsening acidosis, patient was started on CVVHD on 1/24/19  Price Remak was changed to CVVH pre filter   Patient seen and examined on CVVH by me today    - Due to recurrent filter clotting issues, patient was started on pre filter heparin drip 1/26/19  -will continue heparin drip to 1000 units/hour with goal PTT in mid 40s     -FF improved  -continue CVVH 4 potassium/3 calcium, Q sub 2 9 L/hr, 0 to 50 mL/hr UF as tolerated, Qb 330ml/min  -continue to closely monitor intake and output   Avoid nephrotoxins or NSAIDs  -urinalysis showed very concentrated sample, greater than 3+ proteinuria, innumerable RBCs, filled obscured four WBCs/bacteria   Positive nitrate   -recent CT scan without contrast shows no hydronephrosis, otherwise unremarkable kidneys  -CK improving     2  Septic shock/component of cardiogenic shock, MSSA bacteremia  -CHON did not show evidence of valvular vegetations    -antibiotic as per ICU team   Currently remains on Levophed, Milrinone and vasopressin  Could start midodrine if able to wean off levophed for BP support      3  Metabolic acidosis/lactic acidosis - resolving on CRRT  -likely secondary to worsened renal failure in the setting of septic shock      4  Mild volume overload  -UF removal as above as blood pressure tolerated  -CHF with EF 30%   Close cardiology follow-up  -urine output remains poor  On milrinone gtt      5  History of aortic stenosis, status post bioprosthetic AVR in 2014    6  VDRF s/p intubation - 40% FiO2    7  Hyperphosphatemia-likely due to CRRT, replete per protocol    8   Anemia of critical illness - Hgb stable in high 10s, continue to monitor  Platelets improving a bit today  Doubt TMA      Discussed with ICU team        Subjective:   Patient is intubated but awake  He is on Levophed, vasopressin, Milrinone drip  He is also on heparin drip  He is tachycardic  Not making much urine  Patient seen examined by me on CRRT at approximately 9:15 a m  Balwinder Manifold UF goal even  Objective:     Vitals: Blood pressure (!) 87/49, pulse 100, temperature 98 2 °F (36 8 °C), temperature source Oral, resp  rate 19, height 5' 9" (1 753 m), weight (!) 166 kg (366 lb 2 9 oz), SpO2 94 %  ,Body mass index is 54 08 kg/m²  Temp (24hrs), Av 7 °F (36 5 °C), Min:97 4 °F (36 3 °C), Max:98 2 °F (36 8 °C)      Weight (last 2 days)     Date/Time   Weight    19 1130  (!)  166 (366 18)                Intake/Output Summary (Last 24 hours) at 19 1159  Last data filed at 19 1100   Gross per 24 hour   Intake          3534 48 ml   Output             3684 ml   Net          -149 52 ml     I/O last 24 hours: In: 4316 5 [I V :1753 9; NG/GT:40; IV Piggyback:1277 6; Feedings:1245]  Out: 4481 [Other:4481]        Physical Exam:   Physical Exam   Constitutional: He appears well-developed and well-nourished  No distress  HENT:   Head: Normocephalic and atraumatic  Mouth/Throat: No oropharyngeal exudate  +ETT   Eyes: Right eye exhibits no discharge  Left eye exhibits no discharge  No scleral icterus  Neck: Neck supple  Cardiovascular: Regular rhythm and normal heart sounds  tachycardic   Pulmonary/Chest: Effort normal  He has no wheezes  He has rales  Abdominal: Soft  Bowel sounds are normal  He exhibits no distension  There is no tenderness  Musculoskeletal: He exhibits edema  Neurological: He is alert  awake   Skin: Skin is warm and dry  No rash noted  He is not diaphoretic  Psychiatric:   Flat affect   Vitals reviewed        Invasive Devices     Central Venous Catheter Line            CVC Central Lines 19 Triple Left Internal jugular 3 days Peripheral Intravenous Line            Peripheral IV 01/27/19 Left;Upper Arm 1 day    Peripheral IV 01/27/19 Right;Medial Antecubital 1 day          Arterial Line            Arterial Line 01/24/19 Right Radial 3 days          Line            Temporary HD Catheter 3 days          Drain            NG/OG/Enteral Tube Orogastric 14 Fr Right mouth 4 days          Airway            ETT  Cuffed 7 5 mm 4 days                Medications:    Scheduled Meds:  Current Facility-Administered Medications:  acetaminophen 650 mg Oral Q6H PRN Blane Wood MD    albuterol 2 5 mg Nebulization Q4H PRN Jill Acharya PA-C    bisacodyl 10 mg Rectal Daily PRN Jill Acharya PA-C    cefazolin 2,000 mg Intravenous Q12H Bruna Mejia PA-C Last Rate: Stopped (01/28/19 1059)   chlorhexidine 15 mL Swish & Spit Q12H [address] Jillnatalie Acharya PA-C    dexmedetomidine 0 1-0 7 mcg/kg/hr Intravenous Titrated Blane Wood MD Last Rate: 0 7 mcg/kg/hr (01/28/19 1127)   fentaNYL 75 mcg/hr Intravenous Titrated Milton Shaikh DO Last Rate: 75 mcg/hr (01/28/19 0604)   fentanyl citrate (PF) 50 mcg Intravenous Q1H PRN Blane Wood MD    heparin (porcine) 1,000 Units/hr Intravenous Continuous Paola Ramírez MD Last Rate: 1,000 Units/hr (01/28/19 0600)   hydrocortisone sodium succinate 50 mg Intravenous Q6H [address] Yrn Santos DO    insulin lispro 5-25 Units Subcutaneous Q6H [address] Priyank Ellington PA-C    milrinone [facility]) infusion 0 13 mcg/kg/min Intravenous Continuous Ileene CanHERBERT claudio Last Rate: 0 13 mcg/kg/min (01/27/19 1920)   norepinephrine 1-30 mcg/min Intravenous Titrated Jill Acharya PA-C Last Rate: 2 mcg/min (01/28/19 0950)   NxStage K 4/Ca 3 20,000 mL Dialysis Continuous Giorgi Jackman MD Last Rate: 0 mL (01/25/19 1510)   nystatin  Topical BID Jill Acharya PA-C    polyethylene glycol 17 g Oral Daily KATHY Mathias 8 8 mg Oral HS Priyank Ellington PA-C    vasopressin (PITRESSIN) in 0 9 % sodium chloride 100 mL 0 04 Units/min Intravenous Continuous KATHY Patel Last Rate: 0 04 Units/min (01/28/19 0708)       PRN Meds:   acetaminophen    albuterol    bisacodyl    fentanyl citrate (PF)    Continuous Infusions:  dexmedetomidine 0 1-0 7 mcg/kg/hr Last Rate: 0 7 mcg/kg/hr (01/28/19 1127)   fentaNYL 75 mcg/hr Last Rate: 75 mcg/hr (01/28/19 0604)   heparin (porcine) 1,000 Units/hr Last Rate: 1,000 Units/hr (01/28/19 0600)   milrinone (PRIMACOR) infusion 0 13 mcg/kg/min Last Rate: 0 13 mcg/kg/min (01/27/19 1920)   norepinephrine 1-30 mcg/min Last Rate: 2 mcg/min (01/28/19 0950)   NxStage K 4/Ca 3 20,000 mL Last Rate: 0 mL (01/25/19 1510)   vasopressin (PITRESSIN) in 0 9 % sodium chloride 100 mL 0 04 Units/min Last Rate: 0 04 Units/min (01/28/19 0708)           LAB RESULTS:        Results from last 7 days  Lab Units 01/28/19  0615 01/28/19  0500 01/27/19  2345 01/27/19  1801 01/27/19  1148 01/27/19  0555 01/27/19  0512 01/27/19  0000 01/26/19  1914  01/26/19  0601 01/26/19  0515  01/25/19  0410 01/24/19  1613 01/24/19  0502  01/23/19  2303  01/23/19  1600   WBC Thousand/uL  --  16 14*  --   --   --   --  15 47*  --   --   --   --  13 48*  --  11 12* 12 29* 12 10*  --  12 27*  --  16 01*   HEMOGLOBIN g/dL  --  10 7*  --   --   --   --  11 1*  --   --   --   --  11 8*  --  13 5 13 8 13 3  --  14 4  --  14 6   HEMATOCRIT %  --  33 2*  --   --   --   --  35 1*  --   --   --   --  36 8  --  42 5 44 2 43 4  --  46 4  --  47 0   PLATELETS Thousands/uL  --  57*  --   --   --   --  46*  --   --   --   --  46*  --  57* 64* 81*  --  83*  --  112*   NEUTROS PCT %  --   --   --   --   --   --  82*  --   --   --   --  86*  --  86*  --   --   --   --   --  94*   LYMPHS PCT %  --   --   --   --   --   --  4*  --   --   --   --  3*  --  3*  --   --   --   --   --  2*   LYMPHO PCT %  --  1*  --   --   --   --   --   --   --   --   --   --   --   --   --   --   --   --   --   --    MONOS PCT %  --   --   --   --   -- --  9  --   --   --   --  10  --  9  --   --   --   --   --  3*   MONO PCT %  --  4  --   --   --   --   --   --   --   --   --   --   --   --   --   --   --   --   --   --    EOS PCT %  --  0  --   --   --   --  0  --   --   --   --  0  --  0  --   --   --   --   --  0   POTASSIUM mmol/L 4 0  --  4 0 4 2 4 2  --  4 1 4 0 4 1  < > 4 2  --   < > 3 7 3 9 3 7  < >  --   < > 3 9   CHLORIDE mmol/L 105  --  106 106 106  --  107 108 107  < > 107  --   < > 104 107 106  < >  --   < > 99*   CO2 mmol/L 25  --  25 24 25  --  22 24 22  < > 21  --   < > 20* 17* 22  < >  --   < > 25   BUN mg/dL 31*  --  32* 33* 32*  --  33* 33* 32*  < > 36*  --   < > 45* 52* 45*  < >  --   < > 34*   CREATININE mg/dL 2 32*  --  2 31* 2 48* 2 41*  --  2 54* 2 58* 2 68*  < > 2 89*  --   < > 3 64* 4 03* 3 70*  < >  --   < > 2 63*   CALCIUM mg/dL 8 2*  --  8 3 7 9* 8 5  --  8 3 8 2* 8 2*  < > 8 2*  --   < > 7 5* 6 6* 7 0*  < >  --   < > 8 0*   ALK PHOS U/L  --   --   --   --   --   --  90  88  --   --   --  79  --   --  80 73  --   --   --   --  89   ALT U/L  --   --   --   --   --   --  59  64  --   --   --  159*  --   --  323* 344*  --   --   --   --  90*   AST U/L  --   --   --   --   --   --  277*  279*  --   --   --  551*  --   --  1,114* 1,513*  --   --   --   --  242*   MAGNESIUM mg/dL 2 1  --  2 0 2 0 2 1 2 1  --  2 1 2 1  < > 2 1  --   < > 2 0 2 2 2 0  --   --   --  2 0   PHOSPHORUS mg/dL 2 5*  --  2 5* 2 3* 2 4* 2 5*  --  2 5* 2 2*  < > 2 4*  --   < > 3 1 4 6* 4 4  < >  --   --   --    < > = values in this interval not displayed      CUTURES:  Lab Results   Component Value Date    BLOODCX No Growth at 48 hrs  01/26/2019    BLOODCX Staphylococcus aureus (A) 01/26/2019    BLOODCX Staphylococcus aureus (A) 01/24/2019    BLOODCX Staphylococcus aureus (A) 01/24/2019    BLOODCX Staphylococcus aureus (A) 01/23/2019    BLOODCX Staphylococcus aureus (A) 01/23/2019    URINECX 9945-7713 cfu/ml Gram Positive Cocci (A) 01/23/2019 Portions of the record may have been created with voice recognition software  Occasional wrong word or "sound a like" substitutions may have occurred due to the inherent limitations of voice recognition software  Read the chart carefully and recognize, using context, where substitutions have occurred  If you have any questions, please contact the dictating provider

## 2019-01-28 NOTE — TREATMENT PLAN
The patient's PTT is 33 and there have been no recent issues with clotting of the filter  Will change PTT goal 30-35 and continue current rate of heparin gtt at 1000u/hr  Have d/w nursing  Continue to monitor PTT q6hr

## 2019-01-28 NOTE — RESPIRATORY THERAPY NOTE
RT Ventilator Management Note  Wilberto Suggs 60 y o  male MRN: [id number]  Unit/Bed#: [facility] 11 Encounter: [id number]      Daily Screen       1/26/2019 0746 1/27/2019 0705          Patient safety screen outcome[de-identified] - Failed      Not Ready for Weaning due to[de-identified] Underline problem not resolved Underline problem not resolved              Physical Exam:   Assessment Type: Assess only  General Appearance: Sedated  Respiratory Pattern: Assisted  Chest Assessment: Chest expansion symmetrical  Suction: ET Tube  O2 Device: vent      Resp Comments: con't on A/C settings 16/500/40%/+5 as ordered, blanca well, no resp distress, VS stable, no changes at this itme, will con't to monitor

## 2019-01-29 ENCOUNTER — APPOINTMENT (INPATIENT)
Dept: RADIOLOGY | Facility: HOSPITAL | Age: 59
DRG: 871 | End: 2019-01-29
Payer: COMMERCIAL

## 2019-01-29 PROBLEM — M62.82 RHABDOMYOLYSIS: Status: RESOLVED | Noted: 2019-01-24 | Resolved: 2019-01-29

## 2019-01-29 PROBLEM — D64.9 ANEMIA: Status: ACTIVE | Noted: 2019-01-29

## 2019-01-29 PROBLEM — B95.61 STAPHYLOCOCCUS AUREUS BACTEREMIA: Status: ACTIVE | Noted: 2019-01-25

## 2019-01-29 LAB
ANION GAP SERPL CALCULATED.3IONS-SCNC: 7 MMOL/L (ref 4–13)
ANION GAP SERPL CALCULATED.3IONS-SCNC: 9 MMOL/L (ref 4–13)
APTT PPP: 26 SECONDS (ref 26–38)
APTT PPP: 30 SECONDS (ref 26–38)
APTT PPP: 31 SECONDS (ref 26–38)
APTT PPP: 33 SECONDS (ref 26–38)
BASE EX.OXY STD BLDV CALC-SCNC: 54.7 % (ref 60–80)
BASE EXCESS BLDV CALC-SCNC: 1.3 MMOL/L
BASOPHILS # BLD MANUAL: 0 THOUSAND/UL (ref 0–0.1)
BASOPHILS NFR MAR MANUAL: 0 % (ref 0–1)
BUN SERPL-MCNC: 32 MG/DL (ref 5–25)
BUN SERPL-MCNC: 33 MG/DL (ref 5–25)
BUN SERPL-MCNC: 33 MG/DL (ref 5–25)
BUN SERPL-MCNC: 34 MG/DL (ref 5–25)
CA-I BLD-SCNC: 1.15 MMOL/L (ref 1.12–1.32)
CA-I BLD-SCNC: 1.15 MMOL/L (ref 1.12–1.32)
CA-I BLD-SCNC: 1.16 MMOL/L (ref 1.12–1.32)
CALCIUM SERPL-MCNC: 8.1 MG/DL (ref 8.3–10.1)
CALCIUM SERPL-MCNC: 8.1 MG/DL (ref 8.3–10.1)
CALCIUM SERPL-MCNC: 8.2 MG/DL (ref 8.3–10.1)
CALCIUM SERPL-MCNC: 8.2 MG/DL (ref 8.3–10.1)
CHLORIDE SERPL-SCNC: 105 MMOL/L (ref 100–108)
CHLORIDE SERPL-SCNC: 106 MMOL/L (ref 100–108)
CHLORIDE SERPL-SCNC: 106 MMOL/L (ref 100–108)
CHLORIDE SERPL-SCNC: 107 MMOL/L (ref 100–108)
CO2 SERPL-SCNC: 24 MMOL/L (ref 21–32)
CO2 SERPL-SCNC: 24 MMOL/L (ref 21–32)
CO2 SERPL-SCNC: 25 MMOL/L (ref 21–32)
CO2 SERPL-SCNC: 25 MMOL/L (ref 21–32)
CREAT SERPL-MCNC: 2.22 MG/DL (ref 0.6–1.3)
CREAT SERPL-MCNC: 2.24 MG/DL (ref 0.6–1.3)
CREAT SERPL-MCNC: 2.24 MG/DL (ref 0.6–1.3)
CREAT SERPL-MCNC: 2.26 MG/DL (ref 0.6–1.3)
EOSINOPHIL # BLD MANUAL: 0 THOUSAND/UL (ref 0–0.4)
EOSINOPHIL NFR BLD MANUAL: 0 % (ref 0–6)
ERYTHROCYTE [DISTWIDTH] IN BLOOD BY AUTOMATED COUNT: 16.9 % (ref 11.6–15.1)
GFR SERPL CREATININE-BSD FRML MDRD: 31 ML/MIN/1.73SQ M
GLUCOSE SERPL-MCNC: 124 MG/DL (ref 65–140)
GLUCOSE SERPL-MCNC: 132 MG/DL (ref 65–140)
GLUCOSE SERPL-MCNC: 136 MG/DL (ref 65–140)
GLUCOSE SERPL-MCNC: 141 MG/DL (ref 65–140)
GLUCOSE SERPL-MCNC: 168 MG/DL (ref 65–140)
GLUCOSE SERPL-MCNC: 169 MG/DL (ref 65–140)
GLUCOSE SERPL-MCNC: 171 MG/DL (ref 65–140)
GLUCOSE SERPL-MCNC: 173 MG/DL (ref 65–140)
HCO3 BLDV-SCNC: 27.9 MMOL/L (ref 24–30)
HCT VFR BLD AUTO: 32.5 % (ref 36.5–49.3)
HGB BLD-MCNC: 10.2 G/DL (ref 12–17)
INR PPP: 1.07 (ref 0.86–1.17)
LYMPHOCYTES # BLD AUTO: 0.21 THOUSAND/UL (ref 0.6–4.47)
LYMPHOCYTES # BLD AUTO: 1 % (ref 14–44)
MAGNESIUM SERPL-MCNC: 1.9 MG/DL (ref 1.6–2.6)
MAGNESIUM SERPL-MCNC: 2 MG/DL (ref 1.6–2.6)
MAGNESIUM SERPL-MCNC: 2.1 MG/DL (ref 1.6–2.6)
MAGNESIUM SERPL-MCNC: 2.1 MG/DL (ref 1.6–2.6)
MCH RBC QN AUTO: 26.3 PG (ref 26.8–34.3)
MCHC RBC AUTO-ENTMCNC: 31.4 G/DL (ref 31.4–37.4)
MCV RBC AUTO: 84 FL (ref 82–98)
METAMYELOCYTES NFR BLD MANUAL: 5 % (ref 0–1)
MONOCYTES # BLD AUTO: 1.04 THOUSAND/UL (ref 0–1.22)
MONOCYTES NFR BLD: 5 % (ref 4–12)
MYELOCYTES NFR BLD MANUAL: 7 % (ref 0–1)
NEUTROPHILS # BLD MANUAL: 16.77 THOUSAND/UL (ref 1.85–7.62)
NEUTS BAND NFR BLD MANUAL: 6 % (ref 0–8)
NEUTS SEG NFR BLD AUTO: 75 % (ref 43–75)
NRBC BLD AUTO-RTO: 0 /100 WBCS
O2 CT BLDV-SCNC: 8.4 ML/DL
PCO2 BLDV: 53.6 MM HG (ref 42–50)
PH BLDV: 7.33 [PH] (ref 7.3–7.4)
PHOSPHATE SERPL-MCNC: 2.4 MG/DL (ref 2.7–4.5)
PHOSPHATE SERPL-MCNC: 2.4 MG/DL (ref 2.7–4.5)
PHOSPHATE SERPL-MCNC: 2.5 MG/DL (ref 2.7–4.5)
PHOSPHATE SERPL-MCNC: 2.6 MG/DL (ref 2.7–4.5)
PLATELET # BLD AUTO: 87 THOUSANDS/UL (ref 149–390)
PLATELET BLD QL SMEAR: ABNORMAL
PMV BLD AUTO: 13.6 FL (ref 8.9–12.7)
PO2 BLDV: 30.4 MM HG (ref 35–45)
POIKILOCYTOSIS BLD QL SMEAR: PRESENT
POLYCHROMASIA BLD QL SMEAR: PRESENT
POTASSIUM SERPL-SCNC: 3.7 MMOL/L (ref 3.5–5.3)
POTASSIUM SERPL-SCNC: 4 MMOL/L (ref 3.5–5.3)
POTASSIUM SERPL-SCNC: 4.1 MMOL/L (ref 3.5–5.3)
POTASSIUM SERPL-SCNC: 4.3 MMOL/L (ref 3.5–5.3)
PROMYELOCYTES NFR BLD MANUAL: 1 % (ref 0–0)
PROTHROMBIN TIME: 14 SECONDS (ref 11.8–14.2)
RBC # BLD AUTO: 3.88 MILLION/UL (ref 3.88–5.62)
RBC MORPH BLD: PRESENT
SODIUM SERPL-SCNC: 137 MMOL/L (ref 136–145)
SODIUM SERPL-SCNC: 137 MMOL/L (ref 136–145)
SODIUM SERPL-SCNC: 139 MMOL/L (ref 136–145)
SODIUM SERPL-SCNC: 139 MMOL/L (ref 136–145)
WBC # BLD AUTO: 20.7 THOUSAND/UL (ref 4.31–10.16)

## 2019-01-29 PROCEDURE — 85027 COMPLETE CBC AUTOMATED: CPT | Performed by: PHYSICIAN ASSISTANT

## 2019-01-29 PROCEDURE — 85007 BL SMEAR W/DIFF WBC COUNT: CPT | Performed by: PHYSICIAN ASSISTANT

## 2019-01-29 PROCEDURE — 82948 REAGENT STRIP/BLOOD GLUCOSE: CPT

## 2019-01-29 PROCEDURE — 85610 PROTHROMBIN TIME: CPT | Performed by: PHYSICIAN ASSISTANT

## 2019-01-29 PROCEDURE — 80048 BASIC METABOLIC PNL TOTAL CA: CPT | Performed by: INTERNAL MEDICINE

## 2019-01-29 PROCEDURE — 99233 SBSQ HOSP IP/OBS HIGH 50: CPT | Performed by: INTERNAL MEDICINE

## 2019-01-29 PROCEDURE — 85730 THROMBOPLASTIN TIME PARTIAL: CPT | Performed by: INTERNAL MEDICINE

## 2019-01-29 PROCEDURE — 82330 ASSAY OF CALCIUM: CPT | Performed by: INTERNAL MEDICINE

## 2019-01-29 PROCEDURE — 71045 X-RAY EXAM CHEST 1 VIEW: CPT

## 2019-01-29 PROCEDURE — 83735 ASSAY OF MAGNESIUM: CPT | Performed by: INTERNAL MEDICINE

## 2019-01-29 PROCEDURE — 94760 N-INVAS EAR/PLS OXIMETRY 1: CPT

## 2019-01-29 PROCEDURE — 90945 DIALYSIS ONE EVALUATION: CPT

## 2019-01-29 PROCEDURE — 94003 VENT MGMT INPAT SUBQ DAY: CPT

## 2019-01-29 PROCEDURE — 84100 ASSAY OF PHOSPHORUS: CPT | Performed by: INTERNAL MEDICINE

## 2019-01-29 PROCEDURE — 99291 CRITICAL CARE FIRST HOUR: CPT | Performed by: INTERNAL MEDICINE

## 2019-01-29 PROCEDURE — 90945 DIALYSIS ONE EVALUATION: CPT | Performed by: INTERNAL MEDICINE

## 2019-01-29 PROCEDURE — 82805 BLOOD GASES W/O2 SATURATION: CPT | Performed by: INTERNAL MEDICINE

## 2019-01-29 PROCEDURE — 99232 SBSQ HOSP IP/OBS MODERATE 35: CPT | Performed by: INTERNAL MEDICINE

## 2019-01-29 RX ORDER — MAGNESIUM SULFATE 1 G/100ML
1 INJECTION INTRAVENOUS ONCE
Status: COMPLETED | OUTPATIENT
Start: 2019-01-29 | End: 2019-01-29

## 2019-01-29 RX ORDER — POTASSIUM CHLORIDE 29.8 MG/ML
40 INJECTION INTRAVENOUS ONCE
Status: COMPLETED | OUTPATIENT
Start: 2019-01-29 | End: 2019-01-29

## 2019-01-29 RX ORDER — HEPARIN SODIUM 10000 [USP'U]/100ML
3-30 INJECTION, SOLUTION INTRAVENOUS
Status: DISCONTINUED | OUTPATIENT
Start: 2019-01-29 | End: 2019-02-09

## 2019-01-29 RX ADMIN — CHLORHEXIDINE GLUCONATE 0.12% ORAL RINSE 15 ML: 1.2 LIQUID ORAL at 08:51

## 2019-01-29 RX ADMIN — DEXMEDETOMIDINE 0.7 MCG/KG/HR: 100 INJECTION, SOLUTION, CONCENTRATE INTRAVENOUS at 16:39

## 2019-01-29 RX ADMIN — FENTANYL CITRATE 50 MCG: 50 INJECTION, SOLUTION INTRAMUSCULAR; INTRAVENOUS at 20:00

## 2019-01-29 RX ADMIN — SODIUM PHOSPHATE, MONOBASIC, MONOHYDRATE 6 MMOL: 276; 142 INJECTION, SOLUTION INTRAVENOUS at 19:43

## 2019-01-29 RX ADMIN — CALCIUM GLUCONATE 1 G: 98 INJECTION, SOLUTION INTRAVENOUS at 13:24

## 2019-01-29 RX ADMIN — MIDODRINE HYDROCHLORIDE 5 MG: 5 TABLET ORAL at 06:00

## 2019-01-29 RX ADMIN — DEXMEDETOMIDINE 0.7 MCG/KG/HR: 100 INJECTION, SOLUTION, CONCENTRATE INTRAVENOUS at 09:14

## 2019-01-29 RX ADMIN — AMIODARONE HYDROCHLORIDE 0.5 MG/MIN: 50 INJECTION, SOLUTION INTRAVENOUS at 15:44

## 2019-01-29 RX ADMIN — CHLORHEXIDINE GLUCONATE 0.12% ORAL RINSE 15 ML: 1.2 LIQUID ORAL at 20:06

## 2019-01-29 RX ADMIN — DEXMEDETOMIDINE 0.7 MCG/KG/HR: 100 INJECTION, SOLUTION, CONCENTRATE INTRAVENOUS at 04:12

## 2019-01-29 RX ADMIN — Medication 20000 ML: at 05:52

## 2019-01-29 RX ADMIN — INSULIN LISPRO 5 UNITS: 100 INJECTION, SOLUTION INTRAVENOUS; SUBCUTANEOUS at 00:15

## 2019-01-29 RX ADMIN — FENTANYL CITRATE 50 MCG: 50 INJECTION, SOLUTION INTRAMUSCULAR; INTRAVENOUS at 17:40

## 2019-01-29 RX ADMIN — FENTANYL CITRATE 50 MCG/HR: 50 INJECTION, SOLUTION INTRAMUSCULAR; INTRAVENOUS at 17:15

## 2019-01-29 RX ADMIN — NYSTATIN: 100000 POWDER TOPICAL at 08:51

## 2019-01-29 RX ADMIN — CEFAZOLIN SODIUM 2000 MG: 2 SOLUTION INTRAVENOUS at 08:49

## 2019-01-29 RX ADMIN — VASOPRESSIN 0.04 UNITS/MIN: 20 INJECTION INTRAVENOUS at 01:20

## 2019-01-29 RX ADMIN — FENTANYL CITRATE 50 MCG: 50 INJECTION, SOLUTION INTRAMUSCULAR; INTRAVENOUS at 23:30

## 2019-01-29 RX ADMIN — HEPARIN SODIUM AND DEXTROSE 11.1 UNITS/KG/HR: 10000; 5 INJECTION INTRAVENOUS at 11:10

## 2019-01-29 RX ADMIN — DIBASIC SODIUM PHOSPHATE, MONOBASIC POTASSIUM PHOSPHATE AND MONOBASIC SODIUM PHOSPHATE 2 TABLET: 852; 155; 130 TABLET ORAL at 11:40

## 2019-01-29 RX ADMIN — SENNOSIDES A AND B 8.8 MG: 415.36 LIQUID ORAL at 21:32

## 2019-01-29 RX ADMIN — HYDROCORTISONE SODIUM SUCCINATE 50 MG: 100 INJECTION, POWDER, FOR SOLUTION INTRAMUSCULAR; INTRAVENOUS at 21:33

## 2019-01-29 RX ADMIN — INSULIN LISPRO 5 UNITS: 100 INJECTION, SOLUTION INTRAVENOUS; SUBCUTANEOUS at 06:07

## 2019-01-29 RX ADMIN — CALCIUM GLUCONATE 1 G: 98 INJECTION, SOLUTION INTRAVENOUS at 08:55

## 2019-01-29 RX ADMIN — CEFAZOLIN SODIUM 2000 MG: 2 SOLUTION INTRAVENOUS at 22:00

## 2019-01-29 RX ADMIN — Medication 20000 ML: at 13:21

## 2019-01-29 RX ADMIN — Medication 20000 ML: at 21:00

## 2019-01-29 RX ADMIN — CALCIUM GLUCONATE 1 G: 98 INJECTION, SOLUTION INTRAVENOUS at 01:15

## 2019-01-29 RX ADMIN — SODIUM PHOSPHATE, MONOBASIC, MONOHYDRATE 6 MMOL: 276; 142 INJECTION, SOLUTION INTRAVENOUS at 13:30

## 2019-01-29 RX ADMIN — MAGNESIUM SULFATE HEPTAHYDRATE 1 G: 1 INJECTION, SOLUTION INTRAVENOUS at 13:24

## 2019-01-29 RX ADMIN — CALCIUM GLUCONATE 1 G: 98 INJECTION, SOLUTION INTRAVENOUS at 19:43

## 2019-01-29 RX ADMIN — POTASSIUM CHLORIDE 40 MEQ: 400 INJECTION, SOLUTION INTRAVENOUS at 19:43

## 2019-01-29 RX ADMIN — DEXMEDETOMIDINE 0.7 MCG/KG/HR: 100 INJECTION, SOLUTION, CONCENTRATE INTRAVENOUS at 22:00

## 2019-01-29 RX ADMIN — DEXMEDETOMIDINE 0.7 MCG/KG/HR: 100 INJECTION, SOLUTION, CONCENTRATE INTRAVENOUS at 06:36

## 2019-01-29 RX ADMIN — DEXMEDETOMIDINE 0.7 MCG/KG/HR: 100 INJECTION, SOLUTION, CONCENTRATE INTRAVENOUS at 01:45

## 2019-01-29 RX ADMIN — HYDROCORTISONE SODIUM SUCCINATE 50 MG: 100 INJECTION, POWDER, FOR SOLUTION INTRAMUSCULAR; INTRAVENOUS at 14:27

## 2019-01-29 RX ADMIN — DEXMEDETOMIDINE 0.7 MCG/KG/HR: 100 INJECTION, SOLUTION, CONCENTRATE INTRAVENOUS at 11:17

## 2019-01-29 RX ADMIN — NYSTATIN 1 APPLICATION: 100000 POWDER TOPICAL at 17:11

## 2019-01-29 RX ADMIN — HYDROCORTISONE SODIUM SUCCINATE 50 MG: 100 INJECTION, POWDER, FOR SOLUTION INTRAMUSCULAR; INTRAVENOUS at 05:23

## 2019-01-29 RX ADMIN — DIBASIC SODIUM PHOSPHATE, MONOBASIC POTASSIUM PHOSPHATE AND MONOBASIC SODIUM PHOSPHATE 2 TABLET: 852; 155; 130 TABLET ORAL at 09:21

## 2019-01-29 RX ADMIN — DEXMEDETOMIDINE 0.7 MCG/KG/HR: 100 INJECTION, SOLUTION, CONCENTRATE INTRAVENOUS at 14:09

## 2019-01-29 NOTE — PHYSICAL THERAPY NOTE
Physical Therapy Cancellation Note- pt not appropriate for PT due to cvvh lines and intubation  Will follow    Liliana Lezama PT

## 2019-01-29 NOTE — UTILIZATION REVIEW
Continued Stay Review    Date: 1/29/19   REMAINS IN MICU AND IS MORE TACHYPNIC TODAY AND REMAINS ON THE VENTILATOR  IS ON PRESSORS AND ON CVVH  REMAINS ON IV CEFAZOLIN   NOW IN A FIB    Vital Signs: BP (!) 87/49 (BP Location: Left arm)   Pulse 100   Temp 98 1 °F (36 7 °C) (Oral)   Resp 20   Ht 5' 9" (1 753 m)   Wt (!) 166 kg (366 lb 2 9 oz)   SpO2 93%   BMI 54 08 kg/m²      Assessment/Plan:   1/29 Nephrology Progress Note  1  anuric PATRICK, baseline serum creatinine 0 7 -0 8  -PATRICK most likely secondary to ischemic/septic ATN, rhabdomyolysis  -due to progressive worsening renal failure, concern for mild volume overload, worsening acidosis, patient was started on CVVHD on 1/24/19  Dorthey Oiler was changed to CVVH pre filter   Patient seen and examined on CVVH by me today   - Due to recurrent filter clotting issues, patient was started on pre filter heparin drip 1/26/19  -recommend systemic heparin gtt with discontinuation of prefilter heparin  Have discussed with critical care team  -FF 18%  -continue CVVH 4 potassium/3 calcium, Q sub 2 9 L/hr, 0 to 50 mL/hr UF as tolerated, Qb 330ml/min  -continue to closely monitor intake and output   Avoid nephrotoxins or NSAIDs  -urinalysis showed very concentrated sample, greater than 3+ proteinuria, innumerable RBCs, filled obscured four WBCs/bacteria   Positive nitrate   -recent CT scan without contrast shows no hydronephrosis, otherwise unremarkable kidneys  -CK improving  -as patient anuric, I doubt diuretic would be helpful at this time      2  Septic shock/component of cardiogenic shock, MSSA bacteremia  -CHON did not show evidence of valvular vegetations    -antibiotic as per ICU team   Currently remains on vasopressin  BP improved on midodrine, off levophed gtt     3   Metabolic acidosis/lactic acidosis - resolving on CRRT  -likely secondary to worsened renal failure in the setting of septic shock      4  Mild volume overload  -UF removal as above as blood pressure tolerated  -CHF with EF 30%   Close cardiology follow-up  -urine output remains poor  __________________________  1/29 Cardiology Progress Note  Plan:  --Continue to hold milrinone  --Wean inotropes as able w/ goal MAP>65 mmHg; consider holding midodrine  --Keep CI>2 2     # Mixed shock: Primarily driven by sepsis w/ cardiogenic component  MvO2 60% from SVC (likely underestimating MvO2 given location) -> correlates to CO/CI of 6 6/2 3  Actual CO/CI likely slightly higher  CvO2 decreased today to 54 7%; now off levophed, on midodrine, and vasopressin      # BiV HFrEF, LVEF 30%, LVIDd 6 5 cm: TTE shows LVEF 25-30%, cLVH, dilated RV with reduced RVSF  BioAVR with normal function  ? myocardial depression from sepsis  Unclear at this time  Once clinically improved, would consider repeat TTE  If continues to be low, then will consider further workup for alternative etiologies at that time  --Plan as per fellow note below     # Bio AVR: CHON without vegetation  # NSTEMI type II  # New onset AFib on long term AC for CVA ppx  # PATRICK on CVVH  # Shock liver  _____________________  1/29 Critical Care Progress Note  Neuro:   Toxic metabolic encephalopathy  - patient is more awake today, follows commands    -Sedation & Analgesia: Precedex and Fentanyl   Neuro checks as per unit protocol  Take precautions to prevent ICU delirium   Monitor GCS      CV:   Shock, likely multifactorial, pressor requirements improving  - MIlrinone discontinued yesterday  -Currently weaned of Levophed, on Vasopressin 0 04  -Continue Hydrocortisone  -Wean as tolerated, with MAP goal of > 65      New onset A fib with RVR   - Amiodarone drip started yesterday  - A fib   - No AC 2/2 low plts @ 87 today, improved from yesterday  - Will consider start of anticoagulation  - per Cardiology, consider a repeat TTE when clinically improved  - continue tele monitoring     NSTEMI type 2 2/2 shock  - Continue to hold ASA, statin, BB 2/2 shock and thrombocytopenia   Continuous telemetry      Pulm:   Acute hypoxic respiratory failure  - Intubated on vent settings 16/500/40/5  - SPO2 goal > 92%   -will try to wean off ventilator as tolerated  -monitor secretions      GI:   Shock liver vs hepatic congestion  · GI ppx: not indicated, pt on TF  · Bowel regimen: colace, senna     : Acute renal failure on CKD and Rhabdo on CVVH  CVVH running even with heaprin infusion @1000 units/hr per nephro with goal of PTT 30-35  · UOP -anuric   · Monitor I&Os, net negative 268 5mL over past 24H  · Cr 2 32, GFR 30  · CK improving     F/E/N:  1   Fluids/Electrolytes     Medications:   Scheduled Meds:   Current Facility-Administered Medications:  acetaminophen 650 mg Oral Q6H PRN    albuterol 2 5 mg Nebulization Q4H PRN    amiodarone 0 5 mg/min Intravenous Continuous Last Rate: 0 5 mg/min (01/29/19 0740)   bisacodyl 10 mg Rectal Daily PRN    calcium gluconate 1 G  100 mL IVPB 1 g Intravenous Once    cefazolin 2,000 mg Intravenous Q12H Last Rate: Stopped (01/29/19 1000)   chlorhexidine 15 mL Swish & Spit Q12H [facility]    dexmedetomidine 0 1-0 7 mcg/kg/hr Intravenous Titrated Last Rate: 0 5 mcg/kg/hr (01/29/19 1244)   fentaNYL 50 mcg/hr Intravenous Titrated Last Rate: 50 mcg/hr (01/29/19 1255)   fentanyl citrate (PF) 50 mcg Intravenous Q1H PRN    heparin (porcine) 3-20 Units/kg/hr (Order-Specific) Intravenous Titrated Last Rate: 11 1 Units/kg/hr (01/29/19 1110)   hydrocortisone sodium succinate 50 mg Intravenous Q8H [facility]    insulin lispro 5-25 Units Subcutaneous Q6H [facility]    magnesium sulfate 1 g Intravenous Once    norepinephrine 1-30 mcg/min Intravenous Titrated Last Rate: Stopped (01/29/19 5665)   NxStage K 4/Ca 3 20,000 mL Dialysis Continuous Last Rate: 0 mL (01/29/19 4158)   nystatin  Topical BID    polyethylene glycol 17 g Oral Daily    potassium-sodium phosphateS 2 tablet Oral BID With Meals    senna 8 8 mg Oral HS    sodium phosphate 6 mmol Intravenous Once    vasopressin (PITRESSIN) in 0 9 % sodium chloride 100 mL 0 04 Units/min Intravenous Continuous Last Rate: Stopped (01/29/19 1000)     Continuous Infusions:   amiodarone 0 5 mg/min Last Rate: 0 5 mg/min (01/29/19 0740)   dexmedetomidine 0 1-0 7 mcg/kg/hr Last Rate: 0 5 mcg/kg/hr (01/29/19 1244)   fentaNYL 50 mcg/hr Last Rate: 50 mcg/hr (01/29/19 1255)   heparin (porcine) 3-20 Units/kg/hr (Order-Specific) Last Rate: 11 1 Units/kg/hr (01/29/19 1110)   norepinephrine 1-30 mcg/min Last Rate: Stopped (01/29/19 0435)   NxStage K 4/Ca 3 20,000 mL Last Rate: 0 mL (01/29/19 0551)   vasopressin (PITRESSIN) in 0 9 % sodium chloride 100 mL 0 04 Units/min Last Rate: Stopped (01/29/19 1000)     PRN Meds:   acetaminophen    albuterol    bisacodyl    fentanyl citrate (PF)     Pertinent Labs/Diagnostic Results:    01/29/19 0908   pH, Landon 7 334    pCO2, Landon 53 6     pO2, Landon 30 4     HCO3, Landon 27 9    Base Excess, Landon 1 3    O2 Content, Landon 8 4    O2 HGB, VENOUS 54 7       BUN/Cr 33/2 26  GLUCOSE 169  KIM 8 1  PHOS 2 6  WBC 20 70 TRENDING UP  H/H 10 2/32 5  PLATELETS 87  POC GLUCOSE RANGE 173-132    1/28 ECG - Atrial fibrillation with rapid ventricular response  Rightward axis  Left bundle branch block  Abnormal ECG  When compared with ECG of 27-JAN-2019 22:44, (unconfirmed)  No significant change was found    Age/Sex: 60 y o  male     Discharge Plan: D     Network Utilization Review Department  Phone: [phone]; Fax [phone]  [email or web address]  ATTENTION: Please call with any questions or concerns to [phone]  and carefully listen to the prompts so that you are directed to the right person  Send all requests for admission clinical reviews, approved or denied determinations and any other requests to fax [phone]   All voicemails are confidential

## 2019-01-29 NOTE — PROGRESS NOTES
Cardiology Progress Note - [address] 60 y o  male MRN: [id number]    [facility]: [id number]      Assessment:  Principal Problem:    Cardiogenic shock ([facility] )  Active Problems:    Increased BMI    NSTEMI (non-ST elevated myocardial infarction) ([facility] )    [facility]RICK (acute kidney injury) ([facility] )    Stress-induced cardiomyopathy    Acute respiratory failure with hypoxia ([facility] )    History of aortic valve replacement with bioprosthetic valve    Atrial fibrillation (HCC)    Septic shock (HCC)    Staphylococcus aureus bacteremia    Transaminitis    Thrombocytopenia (HCC)    Anemia      Plan:  1  HFrEF- LVEF-35%, LVIDd-6 5 cm- Cardiomyopathy in the setting of Septic shock with possible component of cardiogenic shock- Biventricular failure  - Clinically does not appear to be hypervolemic and is warm  - Been off Milrinone since yesterday morning  - SVO2-54 7 from 60 9 yesterday- CO/CI by Duncan-6 34/2 23  - on vasopressin for sepsis, off levo since this morning  Recommend wean off Midodrine to discontinuing it - as this is an alpha agonist which will increase SVR and reduce CO/CI  - [id number]/[id number] with negative 111  - Echo 1/25- Upper normal LV size with LVEF-30%, Concentric hypertrophy, Dilated RV with reduced function, Dyssynergic septum  Bioprosthetic aortic valve- not very well visualized- mean gradient of 13  - His cardiomyopathy is predominantly sepsis mediated myocardial dysfunction     2  Septic shock- with gram positive bacteremia- MSSA- unclear source  - On Cefazolin and stress dose steroids  - CT chest 1/23- basilar consolidative lung changes not impressive to explain the degree of his septic shock and he is not high requirements on the vent itself  - CHON with no evidence of endocarditis  - Plan for eventual spine imaging given persistent bacteremia  He does have coin shaped excoriations on the upper back- wife reports scratching his upper back- possible seeding from    - On Vasopressin to maintain MAP>65, off levo since this morning     3  Bioprosthetic AVR for severe degenerative AS- 25 mm Champagne Magna in July 2014  - Gradients across the valve in the past have been around 21, current gradients are 13- CHON- no endocarditis     4  Troponin elevation  - Troponins elevated to above 40, this is in the setting of septic shock  - EKG with no ischemic changes  - Normal cath in 2014  - This is not a NSTEMI  Platelet count is also low- with ongoing Sepsis- heparin drip was discontinued    5  New onset Atrial fibrillation in the setting of sepsis  - Hold anticoagulation for now, will consider anticoagulation once platelets stabilized  - On IV Amiodarone at 0 5mg/hr     6  PATRICK- in the setting of septic shock  - on CRRT     7  Transaminitis          Subjective:   Patient seen and examined  On CRRT   Off Levophed since this morning, on vaso 0 04, was started on Midodrine 5mg TID    Follows commands off sedation    Telemetry- Atrial fibrillation with ventricular rates of 70-90, intermittently upto 130-150    Objective:     Vitals: Blood pressure (!) 87/49, pulse 80, temperature 98 1 °F (36 7 °C), temperature source Oral, resp  rate (!) 24, height 5' 9" (1 753 m), weight (!) 166 kg (366 lb 2 9 oz), SpO2 94 %  , Body mass index is 54 08 kg/m² ,   Orthostatic Blood Pressures      Most Recent Value   Blood Pressure   87/49 filed at 01/28/2019 0941   Patient Position - Orthostatic VS  Lying filed at 01/28/2019 0941            Intake/Output Summary (Last 24 hours) at 01/29/19 1033  Last data filed at 01/29/19 1000   Gross per 24 hour   Intake          3979 14 ml   Output             3676 ml   Net           303 14 ml         Physical Exam:    GEN: Yue Martínez intubated and sedated, follows commands  HEENT: anicteric, mucous membranes moist  NECK: no jvd, no carotid bruits, right and left central lines  HEART: Irregular rhythm, normal S1 and S2, no murmurs, clicks, gallops or rubs   LUNGS: clear to auscultation bilaterally in the anterior lung fields   ABDOMEN: normal bowel sounds, soft, no tenderness, no distention  EXTREMITIES: peripheral pulses 1+; bluish discoloration of the toes, warm feet  NEURO: no focal findings   SKIN: coin shaped excoriations of the upper back      Current Facility-Administered Medications:     acetaminophen (TYLENOL) oral suspension 650 mg, 650 mg, Oral, Q6H PRN, Randy Sidhu MD, 650 mg at 19 1946    albuterol inhalation solution 2 5 mg, 2 5 mg, Nebulization, Q4H PRN, Riley Osuna PA-C    [COMPLETED] amiodarone 150 mg in dextrose 5 % 100 mL IV bolus, 150 mg, Intravenous, Once, Stopped at 19 1519 **FOLLOWED BY** [] amiodarone (CORDARONE) 900 mg in dextrose 5 % 500 mL infusion, 1 mg/min, Intravenous, Continuous, Stopped at 199 **FOLLOWED BY** amiodarone (CORDARONE) 900 mg in dextrose 5 % 500 mL infusion, 0 5 mg/min, Intravenous, Continuous, Olive Umanzor PA-C, Last Rate: 16 7 mL/hr at 19 0740, 0 5 mg/min at 19 0740    bisacodyl (DULCOLAX) rectal suppository 10 mg, 10 mg, Rectal, Daily PRN, Riley Osuna PA-C    calcium gluconate 1 g in sodium chloride 0 9 % 100 mL IVPB, 1 g, Intravenous, Once, Trevin Roy MD, Last Rate: 50 mL/hr at 19 0855, 1 g at 19 0855    ceFAZolin (ANCEF) IVPB (premix) 2,000 mg, 2,000 mg, Intravenous, Q12H, Tony Mejia PA-C, Stopped at 19 1000    chlorhexidine (PERIDEX) 0 12 % oral rinse 15 mL, 15 mL, Swish & Spit, Q12H [address], [place] LOBITO Ellington PA-C, 15 mL at 19 0851    dexmedetomidine (PRECEDEX) 200 mcg in sodium chloride 0 9 % 50 mL infusion, 0 1-0 7 mcg/kg/hr, Intravenous, Titrated, Randy Sidhu MD, Last Rate: 25 4 mL/hr at 1914, 0 7 mcg/kg/hr at 1914    fentaNYL 1250 mcg in sodium chloride 0 9% 125mL drip, 75 mcg/hr, Intravenous, Titrated, Vimal Melvin DO, Last Rate: 7 5 mL/hr at 19 0740, 75 mcg/hr at 19    fentanyl citrate (PF) 100 MCG/2ML 50 mcg, 50 mcg, Intravenous, Q1H PRN, Wilian Vo MD, 50 mcg at 01/28/19 0641    heparin (porcine) 25,000 units in 250 mL infusion (premix), 3-20 Units/kg/hr (Order-Specific), Intravenous, Titrated, Dat Powell PA-C    hydrocortisone sodium succinate (PF) (Solu-CORTEF) injection 50 mg, 50 mg, Intravenous, Q8H [facility], Olive Umanzor PA-C    insulin lispro (HumaLOG) 100 units/mL subcutaneous injection 5-25 Units, 5-25 Units, Subcutaneous, Q6H [facility], 5 Units at 01/29/19 0607 **AND** Fingerstick Glucose (POCT), , , Q6H, Sara Blanton PA-C    midodrine (PROAMATINE) tablet 5 mg, 5 mg, Oral, TID AC, Olive Umanzor PA-C, 5 mg at 01/29/19 0600    norepinephrine (LEVOPHED) 8 mg (DOUBLE CONCENTRATION) IV in sodium chloride 0 9% 250 mL, 1-30 mcg/min, Intravenous, Titrated, Sara Blanton PA-C, Stopped at 01/29/19 9743    NxStage K 4/Ca 3 dialysis solution (RFP-401) 20,000 mL, 20,000 mL, Dialysis, Continuous, Marilia Man DO, Last Rate: 0 mL/hr at 01/29/19 0551, 20,000 mL at 01/29/19 4852    nystatin (MYCOSTATIN) powder, , Topical, BID, Sara Blanton PA-C    polyethylene glycol (MIRALAX) packet 17 g, 17 g, Oral, Daily, KATHY Mathias, 17 g at 01/26/19 1158    potassium-sodium phosphateS (K-PHOS,PHOSPHA 250) -250 mg tablet 2 tablet, 2 tablet, Oral, BID With Meals, Aylin Rosado MD, 2 tablet at 01/29/19 0921    senna 8 8 mg/5 mL oral syrup 8 8 mg, 8 8 mg, Oral, HS, [place] LOBITO Ellington PA-C, 8 8 mg at 01/25/19 2300    vasopressin (PITRESSIN) 20 Units in sodium chloride 0 9 % 100 mL infusion, 0 04 Units/min, Intravenous, Continuous, KATHY Mathias, Stopped at 01/29/19 1000    Labs & Results:    Lab Results   Component Value Date    CKTOTAL 3,180 (H) 01/27/2019    CKTOTAL 10,421 (H) 01/26/2019    CKTOTAL 38,028 (H) 01/25/2019    CKMB 14 9 (H) 01/27/2019    CKMB 38 7 (H) 01/26/2019    CKMB 139 8 (H) 01/25/2019    CKMBINDEX <1 0 01/27/2019    CKMBINDEX <1 0 01/26/2019    CKMBINDEX <1 0 01/25/2019 TROPONINI 36 20 (H) 01/24/2019    TROPONINI 36 30 (H) 01/24/2019    TROPONINI 34 90 (H) 01/24/2019       Lab Results   Component Value Date    GLUCOSE 92 04/09/2015    CALCIUM 8 2 (L) 01/29/2019     04/09/2015    K 4 3 01/29/2019    CO2 25 01/29/2019     01/29/2019    BUN 34 (H) 01/29/2019    CREATININE 2 24 (H) 01/29/2019       Lab Results   Component Value Date    WBC 20 70 (H) 01/29/2019    HGB 10 2 (L) 01/29/2019    HCT 32 5 (L) 01/29/2019    MCV 84 01/29/2019    PLT 87 (L) 01/29/2019       Results from last 7 days  Lab Units 01/27/19  0555   INR  0 95       Lab Results   Component Value Date    CHOL 146 07/18/2014    CHOL 145 02/21/2014     Lab Results   Component Value Date    HDL 41 06/02/2018    HDL 52 06/27/2017     Lab Results   Component Value Date    LDLCALC 57 06/02/2018    LDLCALC 129 (H) 06/27/2017     Lab Results   Component Value Date    TRIG 102 06/02/2018    TRIG 71 06/27/2017       Lab Results   Component Value Date    ALT 59 01/27/2019    ALT 64 01/27/2019     (H) 01/27/2019     (H) 01/27/2019

## 2019-01-29 NOTE — NUTRITION
Pt will continue to tolerate current TF Rx goal rate of Nepro at 52 ml/hr  Replete Phosphorus  Monitor renal parameters

## 2019-01-29 NOTE — PROGRESS NOTES
Progress Note - Nephrology   Rell Moran 60 y o  male MRN: [id number]  [facility] Encounter: [id number]      Assessment / Plan:  1  anuric PATRICK, baseline serum creatinine 0 7 -0 8  -PATRICK most likely secondary to ischemic/septic ATN, rhabdomyolysis  -due to progressive worsening renal failure, concern for mild volume overload, worsening acidosis, patient was started on CVVHD on 1/24/19  Ebbie Popeye was changed to CVVH pre filter   Patient seen and examined on CVVH by me today   - Due to recurrent filter clotting issues, patient was started on pre filter heparin drip 1/26/19  -recommend systemic heparin gtt with discontinuation of prefilter heparin  Have discussed with critical care team  -FF 18%  -continue CVVH 4 potassium/3 calcium, Q sub 2 9 L/hr, 0 to 50 mL/hr UF as tolerated, Qb 330ml/min  -continue to closely monitor intake and output   Avoid nephrotoxins or NSAIDs  -urinalysis showed very concentrated sample, greater than 3+ proteinuria, innumerable RBCs, filled obscured four WBCs/bacteria   Positive nitrate   -recent CT scan without contrast shows no hydronephrosis, otherwise unremarkable kidneys  -CK improving  -as patient anuric, I doubt diuretic would be helpful at this time      2  Septic shock/component of cardiogenic shock, MSSA bacteremia  -CHON did not show evidence of valvular vegetations    -antibiotic as per ICU team   Currently remains on vasopressin  BP improved on midodrine, off levophed gtt     3  Metabolic acidosis/lactic acidosis - resolving on CRRT  -likely secondary to worsened renal failure in the setting of septic shock      4  Mild volume overload  -UF removal as above as blood pressure tolerated  -CHF with EF 30%   Close cardiology follow-up  -urine output remains poor       5  History of aortic stenosis, status post bioprosthetic AVR in 2014     6  VDRF s/p intubation      7  Hypophosphatemia-likely due to CRRT, replete per protocol     8   Anemia of critical illness - Hgb stable in high 10s, continue to monitor  Platelets improving today  Doubt TMA      Discussed with ICU team        Subjective:   Unable to elicit HPI or review of systems as patient is intubated and sedated  Patient seen examined by me on CRRT this morning at 9:50 a m  Ulisses Moon Patient's target UF -50 mL/hr  He is on heparin drip, vasopressin drip, amiodarone drip as well as midodrine  I note that the patient's filtration fraction is 18%  The nurse states that the filter lasted for only 7 hr   He is on 1000 units heparin per hour  No urine output noted  Objective:     Vitals: Blood pressure (!) 87/49, pulse 100, temperature 98 1 °F (36 7 °C), temperature source Oral, resp  rate 20, height 5' 9" (1 753 m), weight (!) 166 kg (366 lb 2 9 oz), SpO2 93 %  ,Body mass index is 54 08 kg/m²  Temp (24hrs), Av 4 °F (36 9 °C), Min:97 5 °F (36 4 °C), Max:99 1 °F (37 3 °C)      Weight (last 2 days)     Date/Time   Weight    19 1130  (!)  166 (366 18)                Intake/Output Summary (Last 24 hours) at 19 1235  Last data filed at 19 1200   Gross per 24 hour   Intake          3849 14 ml   Output             4182 ml   Net          -332 86 ml     I/O last 24 hours: In: 4645 1 [I V :2106  1; NG/GT:50; IV Piggyback:1172; Feedings:1317]  Out: 4986 [Other:4986]        Physical Exam:   Physical Exam   Constitutional: He appears well-developed and well-nourished  No distress  HENT:   Head: Normocephalic and atraumatic  Mouth/Throat: No oropharyngeal exudate  +ETT   Eyes: Right eye exhibits no discharge  Left eye exhibits no discharge  No scleral icterus  Neck: Neck supple  Cardiovascular: Normal rate, regular rhythm and normal heart sounds  Pulmonary/Chest: Effort normal  He has no wheezes  He has no rales  Coarse BS b/l   Abdominal: Soft  Bowel sounds are normal  He exhibits no distension  There is no tenderness  Musculoskeletal: He exhibits edema  Neurological: He is alert     awake   Skin: Skin is warm and dry  No rash noted  He is not diaphoretic  Psychiatric: He has a normal mood and affect  His behavior is normal    Vitals reviewed        Invasive Devices     Central Venous Catheter Line            CVC Central Lines 01/24/19 Triple Left Internal jugular 5 days          Peripheral Intravenous Line            Peripheral IV 01/27/19 Left;Upper Arm 2 days    Peripheral IV 01/27/19 Right;Medial Antecubital 2 days          Arterial Line            Arterial Line 01/24/19 Right Radial 4 days          Line            Temporary HD Catheter 4 days          Drain            NG/OG/Enteral Tube Orogastric 14 Fr Right mouth 5 days          Airway            ETT  Cuffed 7 5 mm 5 days                Medications:    Scheduled Meds:  Current Facility-Administered Medications:  acetaminophen 650 mg Oral Q6H PRN Noe Salgado MD    albuterol 2 5 mg Nebulization Q4H PRN Alexandra Kenney PA-C    amiodarone 0 5 mg/min Intravenous Continuous Celestine Hassan PA-C Last Rate: 0 5 mg/min (01/29/19 0740)   bisacodyl 10 mg Rectal Daily PRN Alexandra Kenney PA-C    cefazolin 2,000 mg Intravenous Q12H Nick Mejia PA-C Last Rate: Stopped (01/29/19 1000)   chlorhexidine 15 mL Swish & Spit Q12H [facility] Alexandra Kenney PA-C    dexmedetomidine 0 1-0 7 mcg/kg/hr Intravenous Titrated Noe Salgado MD Last Rate: 0 7 mcg/kg/hr (01/29/19 1117)   fentaNYL 75 mcg/hr Intravenous Titrated Vimal Melvin DO Last Rate: 75 mcg/hr (01/29/19 0740)   fentanyl citrate (PF) 50 mcg Intravenous Q1H PRN Noe Salgado MD    heparin (porcine) 3-20 Units/kg/hr (Order-Specific) Intravenous Titrated Celestine Hassan PA-C Last Rate: 11 1 Units/kg/hr (01/29/19 1110)   hydrocortisone sodium succinate 50 mg Intravenous Q8H [facility] Olive Umanzor PA-C    insulin lispro 5-25 Units Subcutaneous Q6H [facility] Alexandra Kenney PA-C    norepinephrine 1-30 mcg/min Intravenous Titrated Alexandra Kenney PA-C Last Rate: Stopped (01/29/19 0435)   NxStage K 4/Ca 3 20,000 mL Dialysis Continuous Marilia Andreina Davenport DO Last Rate: 0 mL (01/29/19 0551)   nystatin  Topical BID Juvencio Teran PA-C    polyethylene glycol 17 g Oral Daily Malu Nailer, CRNP    potassium-sodium phosphateS 2 tablet Oral BID With Meals Tarik Ruffin MD    senruby 8 8 mg Oral HS Juvencio Teran PA-C    vasopressin (PITRESSIN) in 0 9 % sodium chloride 100 mL 0 04 Units/min Intravenous Continuous Malu Nailer, CRNP Last Rate: Stopped (01/29/19 1000)       PRN Meds:   acetaminophen    albuterol    bisacodyl    fentanyl citrate (PF)    Continuous Infusions:  amiodarone 0 5 mg/min Last Rate: 0 5 mg/min (01/29/19 0740)   dexmedetomidine 0 1-0 7 mcg/kg/hr Last Rate: 0 7 mcg/kg/hr (01/29/19 1117)   fentaNYL 75 mcg/hr Last Rate: 75 mcg/hr (01/29/19 0740)   heparin (porcine) 3-20 Units/kg/hr (Order-Specific) Last Rate: 11 1 Units/kg/hr (01/29/19 1110)   norepinephrine 1-30 mcg/min Last Rate: Stopped (01/29/19 0435)   NxStage K 4/Ca 3 20,000 mL Last Rate: 0 mL (01/29/19 0551)   vasopressin (PITRESSIN) in 0 9 % sodium chloride 100 mL 0 04 Units/min Last Rate: Stopped (01/29/19 1000)           LAB RESULTS:        Results from last 7 days  Lab Units 01/29/19  1136 01/29/19  0605 01/29/19  0425 01/29/19  0005 01/28/19  1802 01/28/19  1153 01/28/19  0615 01/28/19  0500 01/27/19  2345  01/27/19  0512  01/26/19  0601 01/26/19  0515  01/25/19  0410 01/24/19  1613 01/24/19  0502  01/23/19  1600   WBC Thousand/uL  --   --  20 70*  --   --   --   --  16 14*  --   --  15 47*  --   --  13 48*  --  11 12* 12 29* 12 10*  < > 16 01*   HEMOGLOBIN g/dL  --   --  10 2*  --   --   --   --  10 7*  --   --  11 1*  --   --  11 8*  --  13 5 13 8 13 3  < > 14 6   HEMATOCRIT %  --   --  32 5*  --   --   --   --  33 2*  --   --  35 1*  --   --  36 8  --  42 5 44 2 43 4  < > 47 0   PLATELETS Thousands/uL  --   --  87*  --   --   --   --  57*  --   --  46*  --   --  46*  --  57* 64* 81*  < > 112*   NEUTROS PCT %  --   --   --   -- --   --   --   --   --   --  82*  --   --  86*  --  86*  --   --   --  94*   LYMPHS PCT %  --   --   --   --   --   --   --   --   --   --  4*  --   --  3*  --  3*  --   --   --  2*   LYMPHO PCT %  --   --  1*  --   --   --   --  1*  --   --   --   --   --   --   --   --   --   --   --   --    MONOS PCT %  --   --   --   --   --   --   --   --   --   --  9  --   --  10  --  9  --   --   --  3*   MONO PCT %  --   --  5  --   --   --   --  4  --   --   --   --   --   --   --   --   --   --   --   --    EOS PCT %  --   --  0  --   --   --   --  0  --   --  0  --   --  0  --  0  --   --   --  0   POTASSIUM mmol/L 4 0 4 3  --  4 1 4 2 4 1 4 0  --  4 0  < > 4 1  < > 4 2  --   < > 3 7 3 9 3 7  < > 3 9   CHLORIDE mmol/L 106 107  --  105 106 106 105  --  106  < > 107  < > 107  --   < > 104 107 106  < > 99*   CO2 mmol/L 24 25  --  25 24 24 25  --  25  < > 22  < > 21  --   < > 20* 17* 22  < > 25   BUN mg/dL 33* 34*  --  33* 31* 32* 31*  --  32*  < > 33*  < > 36*  --   < > 45* 52* 45*  < > 34*   CREATININE mg/dL 2 22* 2 24*  --  2 26* 2 23* 2 29* 2 32*  --  2 31*  < > 2 54*  < > 2 89*  --   < > 3 64* 4 03* 3 70*  < > 2 63*   CALCIUM mg/dL 8 2* 8 2*  --  8 1* 8 4 8 4 8 2*  --  8 3  < > 8 3  < > 8 2*  --   < > 7 5* 6 6* 7 0*  < > 8 0*   ALK PHOS U/L  --   --   --   --   --   --   --   --   --   --  90  88  --  79  --   --  80 73  --   --  89   ALT U/L  --   --   --   --   --   --   --   --   --   --  59  64  --  159*  --   --  323* 344*  --   --  90*   AST U/L  --   --   --   --   --   --   --   --   --   --  277*  279*  --  551*  --   --  1,114* 1,513*  --   --  242*   MAGNESIUM mg/dL 1 9 2 1  --  2 0 2 1 2 1 2 1  --  2 0  < >  --   < > 2 1  --   < > 2 0 2 2 2 0  --  2 0   PHOSPHORUS mg/dL 2 4* 2 4*  --  2 6* 2 4* 2 3* 2 5*  --  2 5*  < >  --   < > 2 4*  --   < > 3 1 4 6* 4 4  < >  --    < > = values in this interval not displayed      CUTURES:  Lab Results   Component Value Date    BLOODCX No Growth at 24 hrs  01/27/2019 BLOODCX No Growth at 24 hrs  01/27/2019    BLOODCX No Growth at 72 hrs  01/26/2019    BLOODCX Staphylococcus aureus (A) 01/26/2019    BLOODCX Staphylococcus aureus (A) 01/24/2019    BLOODCX Staphylococcus aureus (A) 01/24/2019    BLOODCX Staphylococcus aureus (A) 01/23/2019    URINECX 6765-3423 cfu/ml Gram Positive Cocci (A) 01/23/2019                 Portions of the record may have been created with voice recognition software  Occasional wrong word or "sound a like" substitutions may have occurred due to the inherent limitations of voice recognition software  Read the chart carefully and recognize, using context, where substitutions have occurred  If you have any questions, please contact the dictating provider

## 2019-01-29 NOTE — RESPIRATORY THERAPY NOTE
RT Ventilator Management Note  Juan Alberto Ch 60 y o  male MRN: [id number]  Unit/Bed#: [facility] 11 Encounter: [id number]      Daily Screen       1/26/2019 0746 1/27/2019 0705          Patient safety screen outcome[de-identified] - Failed      Not Ready for Weaning due to[de-identified] Underline problem not resolved Underline problem not resolved              Physical Exam:   Assessment Type: (P) Assess only  Respiratory Pattern: (P) Assisted  Chest Assessment: (P) Chest expansion symmetrical  Bilateral Breath Sounds: (P) Clear, Diminished  R Breath Sounds: (P) Diminished, Clear  L Breath Sounds: (P) Diminished, Clear      Resp Comments: (P) Pt remains on AC settings with no changes @ this time  VS are currently stable and pt appears comfortable  Will continue to monitor

## 2019-01-29 NOTE — PROGRESS NOTES
Progress Note - Critical Care   Juju Thomas 60 y o  male MRN: [id number]  Unit/Bed#: [facility] 11 Encounter: [id number]    Attending Physician: Margarita Kelly, DO      ______________________________________________________________________  Assessment and Plan:   Principal Problem:    Cardiogenic shock ([facility] [address] )  Active Problems:    Increased BMI    [facility]non[facility]    PATRICK (acute kidney injury) ([facility] )    Stress-induced cardiomyopathy    Acute respiratory failure with hypoxia ([facility] )    History of aortic valve replacement with bioprosthetic valve    Rhabdomyolysis    Atrial fibrillation ([facility] [address] )    Septic shock (HCC)    Gram-positive bacteremia    Transaminitis    Thrombocytopenia (HCC)  Resolved Problems:    Acute encephalopathy    48-year-old admitted with shock of multifactorial etiology and newly diagnosed AFib with RVR    Neuro:   Toxic metabolic encephalopathy  - patient is more awake today, follows commands    -Sedation & Analgesia: Precedex and Fentanyl   Neuro checks as per unit protocol  Take precautions to prevent ICU delirium   Monitor GCS        CV:   Shock, likely multifactorial, pressor requirements improving  - MIlrinone discontinued yesterday  -Currently weaned of Levophed, on Vasopressin 0 04  -Continue Hydrocortisone  -Wean as tolerated, with MAP goal of > 65       New onset A fib with RVR   - Amiodarone drip started yesterday  - A fib   - No AC 2/2 low plts @ 87 today, improved from yesterday  - Will consider start of anticoagulation  - per Cardiology, consider a repeat TTE when clinically improved  - continue tele monitoring     NSTEMI type 2 2/2 shock  - Continue to hold ASA, statin, BB 2/2 shock and thrombocytopenia   Continuous telemetry      Pulm:   Acute hypoxic respiratory failure  - Intubated on vent settings 16/500/40/5  - SPO2 goal > 92%   -will try to wean off ventilator as tolerated  -monitor secretions        GI:   Shock liver vs hepatic congestion  · GI ppx: not indicated, pt on TF  · Bowel regimen: colace, senna     : Acute renal failure on CKD and Rhabdo on CVVH  CVVH running even with heaprin infusion @1000 units/hr per nephro with goal of PTT 30-35  · UOP -anuric   · Monitor I&Os, net negative 268 5mL over past 24H  · Cr 2 32, GFR 30  · CK improving     F/E/N:  1  Fluids/Electrolytes  · Will continue  Electrolytes and Replete as needed       2   Nutrition: TF at goal         ID:   Unknown source of infection: MSSA bacteremia , likely from wounds on the back  WBC slightly increased likely in the setting of infection versus steroid use  Bcx 1/27 negative at 24 hr  CHON negative for vegetation, continues with persistent bacteremia     Day # 6 of Ancef, renally dosed   Trend fever curve and WBC           Heme:   Thrombocytopenia likely consumptive, improving  Hbg- 10 2, will continue to trend CBC  tranfuse as needed with goal >7  DVT ppx: Held 2/2 low plts, will consider start of anticoagulation  Continue SCDs     Endo:   Blood sugars 158-187 over the last 24 hrs  Blood sugars not well controlled, on steroids  Goal of 140-180  Will continue to monitor  SSI                · Msk/Skin:   · Turn/position 2 hourly  · Wound care   · PT/OT when appropriate      Disposition:  Continued ICU stay    Code Status: Level 1 - Full Code    Counseling / Coordination of Care  Total Critical Care time spent 30 minutes excluding procedures, teaching and family updates  ______________________________________________________________________    Chief Complaint:  Intubated    24 Hour Events:   No overnight events     ______________________________________________________________________    Physical Exam:   Physical Exam   Constitutional: He appears well-developed and well-nourished  HENT:   Head: Normocephalic and atraumatic  Eyes: Pupils are equal, round, and reactive to light  Cardiovascular:   Irregularly irregular   Pulmonary/Chest: Breath sounds normal    Abdominal: Soft  Bowel sounds are normal    Neurological: He is alert  RASS of 0  Follows commands   Skin: Skin is warm and dry  Vitals reviewed  ______________________________________________________________________  Vitals:    19 0430 19 0435 19 0445 19 0500   BP:       BP Location:       Pulse: 88 98 90 102   Resp:  20 18   Temp:       TempSrc:       SpO2: 98% 98% 98% 98%   Weight:       Height:           Temperature:   Temp (24hrs), Av 4 °F (36 9 °C), Min:97 5 °F (36 4 °C), Max:99 1 °F (37 3 °C)    Current Temperature: 98 5 °F (36 9 °C)  Weights:   IBW: 70 7 kg    Body mass index is 54 08 kg/m²  Weight (last 2 days)     Date/Time   Weight    19 1130  (!)  166 (366 18)            Hemodynamic Monitoring:  N/A     Non-Invasive/Invasive Ventilation Settings:  Respiratory    Lab Data (Last 4 hours)    None         O2/Vent Data (Last 4 hours)       0335           Vent Mode AC/VC       Resp Rate (BPM) (BPM) 16       Vt (mL) (mL) 500       FIO2 (%) (%) 40       PEEP (cmH2O) (cmH2O) 5       MV 10 5                 No results found for: PHART, IHE4ZTE, PO2ART, SAW1MNG, B9CSMVQU, BEART, SOURCE    Intake and Outputs:  I/O        07 -  0700  07 -  0700    I V  (mL/kg) 1524 9 (9 2) 1650 3 (9 9)    NG/GT 40     IV Piggyback 1020 6 994    Feedings 1052 1028    Total Intake(mL/kg) 3637 5 (21 9) 3672 3 (22 1)    Other 3906 3803    Total Output 3906 3803    Net -268 5 -130 7          Unmeasured Stool Occurrence 5 x 4 x          Nutrition:        Diet Orders            Start     Ordered    19 0848  Diet Enteral/Parenteral; Tube Feeding No Oral Diet; Nepro; Continuous; 52  Diet effective now     Question Answer Comment   Diet Type Enteral/Parenteral    Enteral/Parenteral Tube Feeding No Oral Diet    Tube Feeding Formula: Nepro    Bolus/Cyclic/Continuous Continuous    Tube Feeding Goal Rate (mL/hr): 52    RD to adjust diet per protocol?  Yes        19 0848 Labs:     Results from last 7 days  Lab Units 01/29/19  0425 01/28/19  0500 01/27/19  0512 01/26/19  0515 01/25/19  0410   WBC Thousand/uL 20 70* 16 14* 15 47* 13 48* 11 12*   HEMOGLOBIN g/dL 10 2* 10 7* 11 1* 11 8* 13 5   HEMATOCRIT % 32 5* 33 2* 35 1* 36 8 42 5   PLATELETS Thousands/uL 87* 57* 46* 46* 57*   NEUTROS PCT %  --   --  82* 86* 86*   MONOS PCT %  --   --  9 10 9   MONO PCT %  --  4  --   --   --        Results from last 7 days  Lab Units 01/29/19 0005 01/28/19 1802 01/28/19  1153  01/27/19  0512  01/26/19  0601  01/25/19  0410   POTASSIUM mmol/L 4 1 4 2 4 1  < > 4 1  < > 4 2  < > 3 7   CHLORIDE mmol/L 105 106 106  < > 107  < > 107  < > 104   CO2 mmol/L 25 24 24  < > 22  < > 21  < > 20*   BUN mg/dL 33* 31* 32*  < > 33*  < > 36*  < > 45*   CREATININE mg/dL 2 26* 2 23* 2 29*  < > 2 54*  < > 2 89*  < > 3 64*   CALCIUM mg/dL 8 1* 8 4 8 4  < > 8 3  < > 8 2*  < > 7 5*   ALK PHOS U/L  --   --   --   --  90  88  --  79  --  80   ALT U/L  --   --   --   --  59  64  --  159*  --  323*   AST U/L  --   --   --   --  277*  279*  --  551*  --  1,114*   < > = values in this interval not displayed  Results from last 7 days  Lab Units 01/29/19 0005 01/28/19 1802 01/28/19  1153   MAGNESIUM mg/dL 2 0 2 1 2 1     Lab Results   Component Value Date    PHOS 2 6 (L) 01/29/2019    PHOS 2 4 (L) 01/28/2019    PHOS 2 3 (L) 01/28/2019        Results from last 7 days  Lab Units 01/29/19 0005 01/28/19 1802 01/28/19  1153  01/27/19  0555  01/25/19  0410 01/24/19  1613   INR   --   --   --   --  0 95  --  0 94 1 04   PTT seconds 30 32 33  < > 28  < >  --  46*   < > = values in this interval not displayed      0  Lab Value Date/Time   TROPONINI 36 20 (H) 01/24/2019 2137   TROPONINI 36 30 (H) 01/24/2019 1831   TROPONINI 34 90 (H) 01/24/2019 1613   TROPONINI >40 00 () 01/24/2019 0502   TROPONINI >40 00 () 01/24/2019 0207   TROPONINI 36 61 () 01/23/2019 2251   TROPONINI 24 29 () 01/23/2019 1850   TROPONINI 14 20 (formerly [facility]) 01/23/2019 1600       Results from last 7 days  Lab Units 01/24/19  1613 01/24/19  0502 01/24/19  0132   LACTIC ACID mmol/L 1 3 2 5* 2 2*     ABG:  Lab Results   Component Value Date    PHART 7 477 (H) 01/26/2019    WKU3JHY 29 6 (LL) 01/26/2019    PO2ART 71 7 (L) 01/26/2019    QBM9CAP 21 4 (L) 01/26/2019    BEART -1 1 01/26/2019    SOURCE Line, Arterial 01/26/2019     Imaging:  No new imaging      Micro:  Lab Results   Component Value Date    BLOODCX No Growth at 24 hrs  01/27/2019    BLOODCX No Growth at 24 hrs  01/27/2019    BLOODCX No Growth at 48 hrs  01/26/2019    BLOODCX Staphylococcus aureus (A) 01/26/2019    URINECX 9525-0811 cfu/ml Gram Positive Cocci (A) 01/23/2019    SPUTUMCULTUR 1+ Growth of Staphylococcus aureus (A) 01/24/2019     Allergies:    Allergies   Allergen Reactions    Penicillins Rash     However, has subsequently tolerated Cefazolin and Cefepime     Medications:   Scheduled Meds:  Current Facility-Administered Medications:  acetaminophen 650 mg Oral Q6H PRN Kiet Kim MD    albuterol 2 5 mg Nebulization Q4H PRN HERBERT Estes    amiodarone 0 5 mg/min Intravenous Continuous Angie Malone PA-C Last Rate: 0 5 mg/min (01/28/19 2130)   bisacodyl 10 mg Rectal Daily PRN HERBERT Estes    cefazolin 2,000 mg Intravenous Q12H Vilma Mejia PA-C Last Rate: Stopped (01/28/19 2135)   chlorhexidine 15 mL Swish & Spit Q12H [address] HERBERT Estes    dexmedetomidine 0 1-0 7 mcg/kg/hr Intravenous Titrated Kiet Kim MD Last Rate: 0 7 mcg/kg/hr (01/29/19 0412)   fentaNYL 75 mcg/hr Intravenous Titrated Monserrat Chavez DO Last Rate: 75 mcg/hr (01/28/19 9914)   fentanyl citrate (PF) 50 mcg Intravenous Q1H PRN Kiet Kim MD    heparin (porcine) 1,000 Units/hr Intravenous Continuous Marilia K DO Magda Last Rate: 1,000 Units/hr (01/28/19 2015)   hydrocortisone sodium succinate 50 mg Intravenous Q6H [address] Yrn Santos DO    insulin lispro 5-25 Units Subcutaneous Q6H [address] J Luis Blackman PA-C    midodrine 5 mg Oral TID AC Kae Lewis PA-C    norepinephrine 1-30 mcg/min Intravenous Titrated J Luis Blackman PA-C Last Rate: Stopped (01/29/19 0435)   NxStage K 4/Ca 3 20,000 mL Dialysis Continuous Marilia K Magda, DO Last Rate: 0 mL (01/28/19 2324)   nystatin  Topical BID J Luis Blackman PA-C    polyethylene glycol 17 g Oral Daily KATHY Mathias    senna 8 8 mg Oral HS J Luis Blackman PA-C    vasopressin (PITRESSIN) in 0 9 % sodium chloride 100 mL 0 04 Units/min Intravenous Continuous KATHY Merrill Last Rate: 0 04 Units/min (01/29/19 0120)     Continuous Infusions:  amiodarone 0 5 mg/min Last Rate: 0 5 mg/min (01/28/19 2130)   dexmedetomidine 0 1-0 7 mcg/kg/hr Last Rate: 0 7 mcg/kg/hr (01/29/19 0412)   fentaNYL 75 mcg/hr Last Rate: 75 mcg/hr (01/28/19 2345)   heparin (porcine) 1,000 Units/hr Last Rate: 1,000 Units/hr (01/28/19 2015)   norepinephrine 1-30 mcg/min Last Rate: Stopped (01/29/19 0435)   NxStage K 4/Ca 3 20,000 mL Last Rate: 0 mL (01/28/19 2324)   vasopressin (PITRESSIN) in 0 9 % sodium chloride 100 mL 0 04 Units/min Last Rate: 0 04 Units/min (01/29/19 0120)     PRN Meds:    acetaminophen 650 mg Q6H PRN   albuterol 2 5 mg Q4H PRN   bisacodyl 10 mg Daily PRN   fentanyl citrate (PF) 50 mcg Q1H PRN     VTE Pharmacologic Prophylaxis: None  VTE Mechanical Prophylaxis: sequential compression device  Invasive lines and devices:   Invasive Devices     Central Venous Catheter Line            CVC Central Lines 01/24/19 Triple Left Internal jugular 4 days          Peripheral Intravenous Line            Peripheral IV 01/27/19 Left;Upper Arm 1 day    Peripheral IV 01/27/19 Right;Medial Antecubital 1 day          Arterial Line            Arterial Line 01/24/19 Right Radial 4 days          Line            Temporary HD Catheter 4 days          Drain            NG/OG/Enteral Tube Orogastric 14 Fr Right mouth 5 days          Airway            ETT  Cuffed 7 5 mm 5 days                     Portions of the record may have been created with voice recognition software  Occasional wrong word or "sound a like" substitutions may have occurred due to the inherent limitations of voice recognition software  Read the chart carefully and recognize, using context, where substitutions have occurred      Brook Howard MD

## 2019-01-29 NOTE — PROGRESS NOTES
Progress Note - Infectious Disease   Pauline Dos Santos 60 y o  male MRN: [id number]  [facility] Encounter: [id number]         Impression/Recommendations:  1  Septic shock   Fever, tachycardia, leukocytosis, hypotension   Also with component of cardiogenic shock  Likely precipitated by MSSA bacteremia   No other clear evidence of acute infection identified   Fevers, procalcitonin  much improved  WBC count remains elevated but otherwise patient clinically seems to be improving  Off of vasopressors this morning      -antibiotic plan as below  -monitor temperatures and hemodynamics  -check CBC in a m   -supportive care     2  MSSA bacteremia   Strong possibility of endocarditis in the setting of bioprosthetic AVR   CHON with no evidence of valvular vegetations   Initial portal of entry may have been related to multiple upper back wounds, which appear scabbed over with no overt evidence of acute infection on exam   CT chest/abdomen/pelvis with no other evidence of acute infection   Bacteremia has been prolonged above fortunately repeat blood cultures show no growth      -continue with renally dosed IV cefazolin as patient continues to clinically improve  -follow-up the most recent blood cultures drawn on 01/27 to ensure clearance of bacteremia   -may require further imaging including spinal imaging, however, no focal neurologic deficits on exam      3  History of bioprosthetic AVR   Secondary to degenerative AS   Placed in July 2014  CHON with no evidence of endocarditis      4  Acute kidney injury   Likely ischemic/septic ATN   Currently on CRRT   Nephrology following closely   Will continue with dose adjusted antibiotic with CRRT      5  Acute hypoxic respiratory failure   Likely in the setting of septic shock as well as acute systolic cardiomyopathy/volume overload   Recent CT chest with no evidence to suggest pulmonary infection    Most recent chest x-ray continues to suggest significant volume overload      -wean off ventilator as able  -monitor secretions  -monitor O2 requirements     6  Elevated CPK   Unclear etiology   Patient with no reported recent fall or traumatic injury  CPK level much improved      7  Abnormal LFTs   Likely secondary to shock state   LFTs much improved          Antibiotics:  Cefazolin     Discussed above plan with critical care service  Subjective:  Patient remains sedated and intubated  No fevers  WBC count up to 20  Off of vasopressors currently  Tolerating volume removal on CRRT  Objective:  Vitals:  Temp:  [97 5 °F (36 4 °C)-99 1 °F (37 3 °C)] 98 1 °F (36 7 °C)  HR:  [] 90  Resp:  [16-33] 22  SpO2:  [91 %-98 %] 93 %  Temp (24hrs), Av 4 °F (36 9 °C), Min:97 5 °F (36 4 °C), Max:99 1 °F (37 3 °C)  Current: Temperature: 98 1 °F (36 7 °C)    Physical Exam:   General:  Sedated, intubated  Eyes:  Conjunctive clear with no hemorrhages or effusions  Oropharynx:  ET tube in place  Neck:  Supple, no lymphadenopathy  Lungs:  Ventilated breath  Cardiac:  Regular rate and rhythm, no murmurs  Abdomen:  Soft, non-tender, non-distented  Extremities:  Stable edema  Skin:  No rashes, no ulcers  Neurological:  Sedated    Lab Results:  I have personally reviewed pertinent labs      Results from last 7 days  Lab Units 19  0605 19  0005 19  1802  19  0512  19  0601  19  0410   POTASSIUM mmol/L 4 3 4 1 4 2  < > 4 1  < > 4 2  < > 3 7   CHLORIDE mmol/L 107 105 106  < > 107  < > 107  < > 104   CO2 mmol/L 25 25 24  < > 22  < > 21  < > 20*   BUN mg/dL 34* 33* 31*  < > 33*  < > 36*  < > 45*   CREATININE mg/dL 2 24* 2 26* 2 23*  < > 2 54*  < > 2 89*  < > 3 64*   EGFR ml/min/1 73sq m 31 31 31  < > 27  < > 23  < > 17   CALCIUM mg/dL 8 2* 8 1* 8 4  < > 8 3  < > 8 2*  < > 7 5*   AST U/L  --   --   --   --  277*  279*  --  551*  --  1,114*   ALT U/L  --   --   --   --  59  64  --  159*  --  323*   ALK PHOS U/L  --   --   --   --  90  88  --  79  --  80 < > = values in this interval not displayed  Results from last 7 days  Lab Units 01/29/19  0425 01/28/19  0500 01/27/19  0512   WBC Thousand/uL 20 70* 16 14* 15 47*   HEMOGLOBIN g/dL 10 2* 10 7* 11 1*   PLATELETS Thousands/uL 87* 57* 46*       Results from last 7 days  Lab Units 01/27/19  1055 01/27/19  1039 01/26/19  0502 01/24/19  1255 01/24/19  1008 01/23/19  2301 01/23/19  1850 01/23/19  1730 01/23/19  1654 01/23/19  1600   BLOOD CULTURE  No Growth at 24 hrs  No Growth at 24 hrs  No Growth at 72 hrs  Staphylococcus aureus* Staphylococcus aureus*  Staphylococcus aureus*  --   --   --   --  Staphylococcus aureus* Staphylococcus aureus*   SPUTUM CULTURE   --   --   --   --  1+ Growth of Staphylococcus aureus*  --   --   --   --   --    GRAM STAIN RESULT   --   --  Gram positive cocci in clusters Gram positive cocci in clusters  Gram positive cocci in clusters No Polys or Bacteria seen  --   --   --  Gram positive cocci in clusters Gram positive cocci in clusters   URINE CULTURE   --   --   --   --   --   --   --  6623-7473 cfu/ml Gram Positive Cocci*  --   --    INFLUENZA B PCR   --   --   --   --   --   --  None Detected  --   --   --    RSV PCR   --   --   --   --   --   --  None Detected  --   --   --    LEGIONELLA URINARY ANTIGEN   --   --   --   --   --  Negative  --   --   --   --        Imaging Studies:   I have personally reviewed pertinent imaging study reports and images in PACS  Chest x-ray from today shows bilateral pulmonary edema    EKG, Pathology, and Other Studies:   I have personally reviewed pertinent reports

## 2019-01-29 NOTE — PLAN OF CARE
CARDIOVASCULAR - ADULT     Maintains optimal cardiac output and hemodynamic stability Progressing     Absence of cardiac dysrhythmias or at baseline rhythm Progressing        GENITOURINARY - ADULT     Absence of urinary retention Progressing        INFECTION - ADULT     Absence or prevention of progression during hospitalization Progressing     Absence of fever/infection during neutropenic period Progressing        Knowledge Deficit     Patient/family/caregiver demonstrates understanding of disease process, treatment plan, medications, and discharge instructions Progressing        METABOLIC, FLUID AND ELECTROLYTES - ADULT     Electrolytes maintained within normal limits Progressing     Fluid balance maintained Progressing        Nutrition/Hydration-ADULT     Nutrient/Hydration intake appropriate for improving, restoring or maintaining nutritional needs Progressing        PAIN - ADULT     Verbalizes/displays adequate comfort level or baseline comfort level Progressing        Potential for Falls     Patient will remain free of falls Progressing        Prexisting or High Potential for Compromised Skin Integrity     Skin integrity is maintained or improved Progressing        SAFETY,RESTRAINT: NV/NON-SELF DESTRUCTIVE BEHAVIOR     Remains free of harm/injury (restraint for non violent/non self-detsructive behavior) Progressing     Returns to optimal restraint-free functioning Progressing          GENITOURINARY - ADULT     Maintains or returns to baseline urinary function Not Progressing        SAFETY ADULT     Maintain or return to baseline ADL function Not Progressing     Maintain or return mobility status to optimal level Not Progressing

## 2019-01-30 PROBLEM — D72.829 LEUKOCYTOSIS: Status: ACTIVE | Noted: 2019-01-30

## 2019-01-30 LAB
ANION GAP SERPL CALCULATED.3IONS-SCNC: 7 MMOL/L (ref 4–13)
ANION GAP SERPL CALCULATED.3IONS-SCNC: 7 MMOL/L (ref 4–13)
ANION GAP SERPL CALCULATED.3IONS-SCNC: 9 MMOL/L (ref 4–13)
ANION GAP SERPL CALCULATED.3IONS-SCNC: 9 MMOL/L (ref 4–13)
APTT PPP: 42 SECONDS (ref 26–38)
APTT PPP: 50 SECONDS (ref 26–38)
APTT PPP: 58 SECONDS (ref 26–38)
APTT PPP: 65 SECONDS (ref 26–38)
BACTERIA BLD CULT: ABNORMAL
BASOPHILS # BLD MANUAL: 0 THOUSAND/UL (ref 0–0.1)
BASOPHILS NFR MAR MANUAL: 0 % (ref 0–1)
BUN SERPL-MCNC: 30 MG/DL (ref 5–25)
BUN SERPL-MCNC: 30 MG/DL (ref 5–25)
BUN SERPL-MCNC: 31 MG/DL (ref 5–25)
BUN SERPL-MCNC: 32 MG/DL (ref 5–25)
CA-I BLD-SCNC: 1.14 MMOL/L (ref 1.12–1.32)
CA-I BLD-SCNC: 1.14 MMOL/L (ref 1.12–1.32)
CA-I BLD-SCNC: 1.17 MMOL/L (ref 1.12–1.32)
CA-I BLD-SCNC: 1.17 MMOL/L (ref 1.12–1.32)
CALCIUM SERPL-MCNC: 8.2 MG/DL (ref 8.3–10.1)
CALCIUM SERPL-MCNC: 8.3 MG/DL (ref 8.3–10.1)
CALCIUM SERPL-MCNC: 8.4 MG/DL (ref 8.3–10.1)
CALCIUM SERPL-MCNC: 8.5 MG/DL (ref 8.3–10.1)
CHLORIDE SERPL-SCNC: 104 MMOL/L (ref 100–108)
CHLORIDE SERPL-SCNC: 105 MMOL/L (ref 100–108)
CHLORIDE SERPL-SCNC: 106 MMOL/L (ref 100–108)
CHLORIDE SERPL-SCNC: 106 MMOL/L (ref 100–108)
CO2 SERPL-SCNC: 24 MMOL/L (ref 21–32)
CO2 SERPL-SCNC: 25 MMOL/L (ref 21–32)
CREAT SERPL-MCNC: 2.15 MG/DL (ref 0.6–1.3)
CREAT SERPL-MCNC: 2.18 MG/DL (ref 0.6–1.3)
CREAT SERPL-MCNC: 2.23 MG/DL (ref 0.6–1.3)
CREAT SERPL-MCNC: 2.23 MG/DL (ref 0.6–1.3)
EOSINOPHIL # BLD MANUAL: 0 THOUSAND/UL (ref 0–0.4)
EOSINOPHIL NFR BLD MANUAL: 0 % (ref 0–6)
ERYTHROCYTE [DISTWIDTH] IN BLOOD BY AUTOMATED COUNT: 17.6 % (ref 11.6–15.1)
GFR SERPL CREATININE-BSD FRML MDRD: 31 ML/MIN/1.73SQ M
GFR SERPL CREATININE-BSD FRML MDRD: 31 ML/MIN/1.73SQ M
GFR SERPL CREATININE-BSD FRML MDRD: 32 ML/MIN/1.73SQ M
GFR SERPL CREATININE-BSD FRML MDRD: 33 ML/MIN/1.73SQ M
GLUCOSE SERPL-MCNC: 116 MG/DL (ref 65–140)
GLUCOSE SERPL-MCNC: 123 MG/DL (ref 65–140)
GLUCOSE SERPL-MCNC: 127 MG/DL (ref 65–140)
GLUCOSE SERPL-MCNC: 128 MG/DL (ref 65–140)
GLUCOSE SERPL-MCNC: 129 MG/DL (ref 65–140)
GLUCOSE SERPL-MCNC: 132 MG/DL (ref 65–140)
GLUCOSE SERPL-MCNC: 135 MG/DL (ref 65–140)
GLUCOSE SERPL-MCNC: 152 MG/DL (ref 65–140)
GRAM STN SPEC: ABNORMAL
HCT VFR BLD AUTO: 35.6 % (ref 36.5–49.3)
HGB BLD-MCNC: 11.1 G/DL (ref 12–17)
LYMPHOCYTES # BLD AUTO: 1.04 THOUSAND/UL (ref 0.6–4.47)
LYMPHOCYTES # BLD AUTO: 4 % (ref 14–44)
MAGNESIUM SERPL-MCNC: 1.9 MG/DL (ref 1.6–2.6)
MAGNESIUM SERPL-MCNC: 2 MG/DL (ref 1.6–2.6)
MAGNESIUM SERPL-MCNC: 2.1 MG/DL (ref 1.6–2.6)
MAGNESIUM SERPL-MCNC: 2.2 MG/DL (ref 1.6–2.6)
MCH RBC QN AUTO: 26.3 PG (ref 26.8–34.3)
MCHC RBC AUTO-ENTMCNC: 31.2 G/DL (ref 31.4–37.4)
MCV RBC AUTO: 84 FL (ref 82–98)
METAMYELOCYTES NFR BLD MANUAL: 2 % (ref 0–1)
MONOCYTES # BLD AUTO: 0.26 THOUSAND/UL (ref 0–1.22)
MONOCYTES NFR BLD: 1 % (ref 4–12)
MYELOCYTES NFR BLD MANUAL: 3 % (ref 0–1)
NEUTROPHILS # BLD MANUAL: 22.45 THOUSAND/UL (ref 1.85–7.62)
NEUTS BAND NFR BLD MANUAL: 2 % (ref 0–8)
NEUTS SEG NFR BLD AUTO: 84 % (ref 43–75)
NRBC BLD AUTO-RTO: 0 /100 WBCS
PHOSPHATE SERPL-MCNC: 2.6 MG/DL (ref 2.7–4.5)
PHOSPHATE SERPL-MCNC: 2.8 MG/DL (ref 2.7–4.5)
PLATELET # BLD AUTO: 131 THOUSANDS/UL (ref 149–390)
PLATELET BLD QL SMEAR: ABNORMAL
PMV BLD AUTO: 12.3 FL (ref 8.9–12.7)
POLYCHROMASIA BLD QL SMEAR: PRESENT
POTASSIUM SERPL-SCNC: 4 MMOL/L (ref 3.5–5.3)
POTASSIUM SERPL-SCNC: 4.1 MMOL/L (ref 3.5–5.3)
POTASSIUM SERPL-SCNC: 4.2 MMOL/L (ref 3.5–5.3)
POTASSIUM SERPL-SCNC: 4.4 MMOL/L (ref 3.5–5.3)
RBC # BLD AUTO: 4.22 MILLION/UL (ref 3.88–5.62)
RBC MORPH BLD: PRESENT
SODIUM SERPL-SCNC: 136 MMOL/L (ref 136–145)
SODIUM SERPL-SCNC: 137 MMOL/L (ref 136–145)
SODIUM SERPL-SCNC: 138 MMOL/L (ref 136–145)
SODIUM SERPL-SCNC: 139 MMOL/L (ref 136–145)
VARIANT LYMPHS # BLD AUTO: 4 %
WBC # BLD AUTO: 26.1 THOUSAND/UL (ref 4.31–10.16)

## 2019-01-30 PROCEDURE — 86706 HEP B SURFACE ANTIBODY: CPT | Performed by: INTERNAL MEDICINE

## 2019-01-30 PROCEDURE — 94760 N-INVAS EAR/PLS OXIMETRY 1: CPT

## 2019-01-30 PROCEDURE — 82948 REAGENT STRIP/BLOOD GLUCOSE: CPT

## 2019-01-30 PROCEDURE — 80048 BASIC METABOLIC PNL TOTAL CA: CPT | Performed by: INTERNAL MEDICINE

## 2019-01-30 PROCEDURE — 84100 ASSAY OF PHOSPHORUS: CPT | Performed by: INTERNAL MEDICINE

## 2019-01-30 PROCEDURE — 83735 ASSAY OF MAGNESIUM: CPT | Performed by: INTERNAL MEDICINE

## 2019-01-30 PROCEDURE — 90945 DIALYSIS ONE EVALUATION: CPT

## 2019-01-30 PROCEDURE — 87340 HEPATITIS B SURFACE AG IA: CPT | Performed by: INTERNAL MEDICINE

## 2019-01-30 PROCEDURE — 90945 DIALYSIS ONE EVALUATION: CPT | Performed by: INTERNAL MEDICINE

## 2019-01-30 PROCEDURE — 99231 SBSQ HOSP IP/OBS SF/LOW 25: CPT | Performed by: INTERNAL MEDICINE

## 2019-01-30 PROCEDURE — 85007 BL SMEAR W/DIFF WBC COUNT: CPT | Performed by: PHYSICIAN ASSISTANT

## 2019-01-30 PROCEDURE — 86704 HEP B CORE ANTIBODY TOTAL: CPT | Performed by: INTERNAL MEDICINE

## 2019-01-30 PROCEDURE — 85027 COMPLETE CBC AUTOMATED: CPT | Performed by: PHYSICIAN ASSISTANT

## 2019-01-30 PROCEDURE — 85730 THROMBOPLASTIN TIME PARTIAL: CPT | Performed by: INTERNAL MEDICINE

## 2019-01-30 PROCEDURE — 86803 HEPATITIS C AB TEST: CPT | Performed by: INTERNAL MEDICINE

## 2019-01-30 PROCEDURE — 86705 HEP B CORE ANTIBODY IGM: CPT | Performed by: INTERNAL MEDICINE

## 2019-01-30 PROCEDURE — 90945 DIALYSIS ONE EVALUATION: CPT | Performed by: NURSE ANESTHETIST, CERTIFIED REGISTERED

## 2019-01-30 PROCEDURE — 94003 VENT MGMT INPAT SUBQ DAY: CPT

## 2019-01-30 PROCEDURE — 82330 ASSAY OF CALCIUM: CPT | Performed by: INTERNAL MEDICINE

## 2019-01-30 PROCEDURE — 99233 SBSQ HOSP IP/OBS HIGH 50: CPT | Performed by: INTERNAL MEDICINE

## 2019-01-30 PROCEDURE — 99291 CRITICAL CARE FIRST HOUR: CPT | Performed by: INTERNAL MEDICINE

## 2019-01-30 RX ORDER — MAGNESIUM SULFATE 1 G/100ML
1 INJECTION INTRAVENOUS ONCE
Status: COMPLETED | OUTPATIENT
Start: 2019-01-30 | End: 2019-01-30

## 2019-01-30 RX ORDER — ALBUMIN, HUMAN INJ 5% 5 %
12.5 SOLUTION INTRAVENOUS ONCE
Status: COMPLETED | OUTPATIENT
Start: 2019-01-30 | End: 2019-01-30

## 2019-01-30 RX ORDER — OXYCODONE HCL 5 MG/5 ML
5 SOLUTION, ORAL ORAL EVERY 4 HOURS PRN
Status: DISCONTINUED | OUTPATIENT
Start: 2019-01-30 | End: 2019-02-01

## 2019-01-30 RX ORDER — GABAPENTIN 100 MG/1
100 CAPSULE ORAL
Status: DISCONTINUED | OUTPATIENT
Start: 2019-01-30 | End: 2019-02-06

## 2019-01-30 RX ORDER — GABAPENTIN 250 MG/5ML
300 SOLUTION ORAL DAILY
Status: DISCONTINUED | OUTPATIENT
Start: 2019-01-30 | End: 2019-01-30

## 2019-01-30 RX ORDER — OXYCODONE HCL 5 MG/5 ML
10 SOLUTION, ORAL ORAL ONCE
Status: COMPLETED | OUTPATIENT
Start: 2019-01-30 | End: 2019-01-30

## 2019-01-30 RX ADMIN — CALCIUM GLUCONATE 1 G: 98 INJECTION, SOLUTION INTRAVENOUS at 12:46

## 2019-01-30 RX ADMIN — AMIODARONE HYDROCHLORIDE 0.5 MG/MIN: 50 INJECTION, SOLUTION INTRAVENOUS at 18:46

## 2019-01-30 RX ADMIN — DEXMEDETOMIDINE 0.6 MCG/KG/HR: 100 INJECTION, SOLUTION, CONCENTRATE INTRAVENOUS at 11:29

## 2019-01-30 RX ADMIN — MAGNESIUM SULFATE HEPTAHYDRATE 1 G: 1 INJECTION, SOLUTION INTRAVENOUS at 01:24

## 2019-01-30 RX ADMIN — DEXMEDETOMIDINE 0.7 MCG/KG/HR: 100 INJECTION, SOLUTION, CONCENTRATE INTRAVENOUS at 19:23

## 2019-01-30 RX ADMIN — NYSTATIN: 100000 POWDER TOPICAL at 08:04

## 2019-01-30 RX ADMIN — FENTANYL CITRATE 50 MCG: 50 INJECTION, SOLUTION INTRAMUSCULAR; INTRAVENOUS at 04:30

## 2019-01-30 RX ADMIN — Medication 20000 ML: at 12:48

## 2019-01-30 RX ADMIN — CALCIUM GLUCONATE 1 G: 98 INJECTION, SOLUTION INTRAVENOUS at 20:00

## 2019-01-30 RX ADMIN — DEXMEDETOMIDINE 0.7 MCG/KG/HR: 100 INJECTION, SOLUTION, CONCENTRATE INTRAVENOUS at 02:47

## 2019-01-30 RX ADMIN — DIBASIC SODIUM PHOSPHATE, MONOBASIC POTASSIUM PHOSPHATE AND MONOBASIC SODIUM PHOSPHATE 2 TABLET: 852; 155; 130 TABLET ORAL at 07:51

## 2019-01-30 RX ADMIN — NYSTATIN: 100000 POWDER TOPICAL at 20:00

## 2019-01-30 RX ADMIN — HEPARIN SODIUM AND DEXTROSE 19.1 UNITS/KG/HR: 10000; 5 INJECTION INTRAVENOUS at 05:00

## 2019-01-30 RX ADMIN — HYDROCORTISONE SODIUM SUCCINATE 50 MG: 100 INJECTION, POWDER, FOR SOLUTION INTRAMUSCULAR; INTRAVENOUS at 17:14

## 2019-01-30 RX ADMIN — CHLORHEXIDINE GLUCONATE 0.12% ORAL RINSE 15 ML: 1.2 LIQUID ORAL at 21:30

## 2019-01-30 RX ADMIN — DEXMEDETOMIDINE 0.7 MCG/KG/HR: 100 INJECTION, SOLUTION, CONCENTRATE INTRAVENOUS at 23:59

## 2019-01-30 RX ADMIN — HEPARIN SODIUM AND DEXTROSE 23.1 UNITS/KG/HR: 10000; 5 INJECTION INTRAVENOUS at 19:25

## 2019-01-30 RX ADMIN — FENTANYL CITRATE 50 MCG: 50 INJECTION, SOLUTION INTRAMUSCULAR; INTRAVENOUS at 21:00

## 2019-01-30 RX ADMIN — CALCIUM GLUCONATE 1 G: 98 INJECTION, SOLUTION INTRAVENOUS at 07:51

## 2019-01-30 RX ADMIN — Medication 20000 ML: at 04:00

## 2019-01-30 RX ADMIN — GABAPENTIN 100 MG: 100 CAPSULE ORAL at 21:40

## 2019-01-30 RX ADMIN — DIBASIC SODIUM PHOSPHATE, MONOBASIC POTASSIUM PHOSPHATE AND MONOBASIC SODIUM PHOSPHATE 2 TABLET: 852; 155; 130 TABLET ORAL at 11:19

## 2019-01-30 RX ADMIN — FENTANYL CITRATE 50 MCG/HR: 50 INJECTION, SOLUTION INTRAMUSCULAR; INTRAVENOUS at 13:30

## 2019-01-30 RX ADMIN — INSULIN LISPRO 5 UNITS: 100 INJECTION, SOLUTION INTRAVENOUS; SUBCUTANEOUS at 23:55

## 2019-01-30 RX ADMIN — CHLORHEXIDINE GLUCONATE 0.12% ORAL RINSE 15 ML: 1.2 LIQUID ORAL at 08:04

## 2019-01-30 RX ADMIN — ALBUMIN (HUMAN) 12.5 G: 12.5 SOLUTION INTRAVENOUS at 20:10

## 2019-01-30 RX ADMIN — DEXMEDETOMIDINE 0.7 MCG/KG/HR: 100 INJECTION, SOLUTION, CONCENTRATE INTRAVENOUS at 05:00

## 2019-01-30 RX ADMIN — DEXMEDETOMIDINE 0.7 MCG/KG/HR: 100 INJECTION, SOLUTION, CONCENTRATE INTRAVENOUS at 21:37

## 2019-01-30 RX ADMIN — CALCIUM GLUCONATE 1 G: 98 INJECTION, SOLUTION INTRAVENOUS at 01:24

## 2019-01-30 RX ADMIN — DEXMEDETOMIDINE 0.6 MCG/KG/HR: 100 INJECTION, SOLUTION, CONCENTRATE INTRAVENOUS at 16:17

## 2019-01-30 RX ADMIN — FENTANYL CITRATE 50 MCG: 50 INJECTION, SOLUTION INTRAMUSCULAR; INTRAVENOUS at 12:43

## 2019-01-30 RX ADMIN — POLYETHYLENE GLYCOL 3350 17 G: 17 POWDER, FOR SOLUTION ORAL at 08:04

## 2019-01-30 RX ADMIN — DEXMEDETOMIDINE 0.6 MCG/KG/HR: 100 INJECTION, SOLUTION, CONCENTRATE INTRAVENOUS at 13:29

## 2019-01-30 RX ADMIN — DEXMEDETOMIDINE 0.7 MCG/KG/HR: 100 INJECTION, SOLUTION, CONCENTRATE INTRAVENOUS at 00:00

## 2019-01-30 RX ADMIN — FENTANYL CITRATE 50 MCG: 50 INJECTION, SOLUTION INTRAMUSCULAR; INTRAVENOUS at 17:16

## 2019-01-30 RX ADMIN — CEFAZOLIN SODIUM 2000 MG: 2 SOLUTION INTRAVENOUS at 19:32

## 2019-01-30 RX ADMIN — Medication 20000 ML: at 16:45

## 2019-01-30 RX ADMIN — DEXMEDETOMIDINE 0.7 MCG/KG/HR: 100 INJECTION, SOLUTION, CONCENTRATE INTRAVENOUS at 08:05

## 2019-01-30 RX ADMIN — Medication 20000 ML: at 10:09

## 2019-01-30 RX ADMIN — SENNOSIDES A AND B 8.8 MG: 415.36 LIQUID ORAL at 21:30

## 2019-01-30 RX ADMIN — OXYCODONE HYDROCHLORIDE 10 MG: 5 SOLUTION ORAL at 01:45

## 2019-01-30 RX ADMIN — Medication 20000 ML: at 23:03

## 2019-01-30 RX ADMIN — HYDROCORTISONE SODIUM SUCCINATE 50 MG: 100 INJECTION, POWDER, FOR SOLUTION INTRAMUSCULAR; INTRAVENOUS at 05:00

## 2019-01-30 RX ADMIN — CEFAZOLIN SODIUM 2000 MG: 2 SOLUTION INTRAVENOUS at 07:51

## 2019-01-30 NOTE — RESPIRATORY THERAPY NOTE
01/30/19 0713   AC/VC Settings   Resp Rate (BPM) 16 BPM   Vt (mL) 500 mL   FIO2 (%) 40 %   PEEP (cmH2O) 5 cmH2O   Flow (LPM) 60 L/min   Trigger Sensitivity Pressure (cm H2O)  3 %   Humidification Heater   Heater Temperature (Set) 98 6 °F (37 °C)   AC/VC Actuals   Resp Rate (BPM) 22 BPM   VT (mL) 528   MV 12 5   MAP (cmH2O) 17 cmH2O   Peak Pressure (cmH2O) 35 cmH2O   I/E Ratio (Obs) 1:1 5   Heater Temperature (Obs) 98 4 °F (36 9 °C)   AC/VC Alarms   High Peak Pressure (cmH2O) 50   High Resp Rate (BPM) 45 BPM   MV High (L/min) 20 L/min   MV Low (L/min) 4 L/min   Vt High (mL) 1000 mL   Vt Low (mL) 400 mL   AC/VC Apnea Settings   Resp Rate (BPM) 16 BPM   VT (mL) 500 mL   FIO2 (%) 100 %   Apnea Time (s) 20 S   Apnea Flow (L/min) 60 L/min   Maintenance   Alarm (pink) cable attached Yes   Resuscitation bag with peep valve at bedside Yes   Water bag changed No   Circuit changed No   Daily Screen   Spont breathing trial outcome: Failed   Spont breathing trial reason failed RR > 35 bpm   Previous settings resumed Yes   Name of Medical Team Notified: R N  NOTIFIED  IHI Ventilator Associated Pneumonia Bundle   Head of Bed Elevated HOB 30   ETT  Cuffed 7 5 mm   Placement Date/Time: 01/23/19 1953   Preoxygenated: Yes  Type: Cuffed  Tube Size: 7 5 mm  Laryngoscope:  Mac  Insertion attempts: 1  Secured at (cm): 24 at lip  Placed By: [employer]   Secured at (cm) 25   Measured from [address] [place] [address] Location Left   Secured by [employer] tube [employer]sales   Site Condition Dry   HI-LO Suction  Intermittent suction   HI-LO Intervention Patent   RT Ventilator Management Note  Juju Thomas 60 y o  male MRN: [id number]  [facility] [id number]      Daily Screen       1/30/2019 0730 1/30/2019 3905          Patient safety screen outcome[de-identified] Passed -      Spont breathing trial outcome[de-identified] - (P)  Failed      Spont breathing trial reason failed: - (P)  RR > 35 bpm      Previous settings resumed: - (P)  Yes      Name of Medical Team Notified[de-identified] - (P)  R N  NOTIFIED  Physical Exam:   Assessment Type: Assess only  General Appearance: Sedated  Respiratory Pattern: Assisted  Chest Assessment: Chest expansion symmetrical  Bilateral Breath Sounds: Clear  Cough: None  Suction: ET Tube  O2 Device: vent      Resp Comments: pt  RR @ 40  placed pt  back on A/C VENT   settings

## 2019-01-30 NOTE — PHYSICAL THERAPY NOTE
PT Cancellation    PT orders received and chart reviewed  Per RN, pt is currently on CVVH and not appropriate to participate in PT evaluation at this time  Will follow and see as able      Thea Snider, PT, DPT

## 2019-01-30 NOTE — PROGRESS NOTES
Progress Note - Infectious Disease   Juan Alberto Ch 60 y o  male MRN: [id number]  Unit/Bed#: [facility] 11 Encounter: [id number]      Impression/Recommendations:  1  Septic shock   Fever, tachycardia, leukocytosis, hypotension   Also with component of cardiogenic shock  Likely precipitated by MSSA bacteremia   No other clear evidence of acute infection identified   Fevers, procalcitonin  much improved  Leukocytosis remains persistent but otherwise patient clinically much improved with clearance of bacteremia  Remains off of vasopressors  No fevers     -antibiotic plan as below  -monitor temperatures and hemodynamics  -check CBC in a m   -supportive care     2  MSSA bacteremia   Strong possibility of endocarditis in the setting of bioprosthetic AVR   CHON with no evidence of valvular vegetations   Initial portal of entry may have been related to multiple upper back wounds, which appear scabbed over with no overt evidence of acute infection on exam   CT chest/abdomen/pelvis with no other evidence of acute infection   Bacteremia has been prolonged  Fortunately, most recent blood cultures remain negative      -continue with renally dosed IV cefazolin as patient continues to clinically improve  -follow-up the most recent blood cultures drawn on 01/27 to ensure clearance of bacteremia   -may require further imaging including spinal imaging, however, no focal neurologic deficits on exam   -will require prolonged course of IV antibiotic of likely 6 weeks in the setting of prolonged bacteremia and bioprosthetic AVR      3  History of bioprosthetic AVR   Secondary to degenerative AS   Placed in July 2014  Mal Early no evidence of endocarditis      4  Acute kidney injury   Likely ischemic/septic ATN   Currently on CRRT which is running negative   Nephrology following closely  Kanu Ray continue with dose adjusted antibiotic with CRRT      5   Acute hypoxic respiratory failure   Likely in the setting of septic shock as well as acute systolic cardiomyopathy/volume overload   Recent CT chest with no evidence to suggest pulmonary infection  Most recent chest x-ray continues to suggest significant volume overload      -wean off ventilator as able  -monitor secretions  -monitor O2 requirements  -continue with volume removal     6  Elevated CPK   Unclear etiology   Patient with no reported recent fall or traumatic injury   CPK level much improved      7  Abnormal LFTs   Likely secondary to shock state   LFTs much improved          Antibiotics:  Cefazolin     Discussed above plan with critical care service  Subjective:  Patient awake on ventilator and answering questions appropriately  Does have burning sensation in the lower extremities  Has been weaned off of vasopressors  Remains on CRRT  No fevers  No diarrhea  WBC count up to 26 today  Objective:  Vitals:  Temp:  [97 9 °F (36 6 °C)-98 8 °F (37 1 °C)] 98 8 °F (37 1 °C)  HR:  [] 92  Resp:  [17-39] 19  SpO2:  [92 %-100 %] 98 %  Temp (24hrs), Av 4 °F (36 9 °C), Min:97 9 °F (36 6 °C), Max:98 8 °F (37 1 °C)  Current: Temperature: 98 8 °F (37 1 °C)    Physical Exam:   General:  Awake on ventilator  Eyes:  Conjunctive clear with no hemorrhages or effusions  Oropharynx:  ET tube in place  Neck:  Supple, no lymphadenopathy  Lungs:  Ventilated breath sounds  Cardiac:  Regular rate and rhythm, no murmurs  Abdomen:  Soft, non-tender, non-distented  Extremities:  Stable edema  Skin:  No rashes, no ulcers  Neurological:  Awake on ventilator, moves all extremities    Lab Results:  I have personally reviewed pertinent labs      Results from last 7 days  Lab Units 19  0607 19  2335 19  1749  19  0512  19  0601  19  0410   POTASSIUM mmol/L 4 4 4 1 3 7  < > 4 1  < > 4 2  < > 3 7   CHLORIDE mmol/L 106 106 106  < > 107  < > 107  < > 104   CO2 mmol/L 24 24 24  < > 22  < > 21  < > 20*   BUN mg/dL 32* 30* 32*  < > 33*  < > 36*  < > 45*   CREATININE mg/dL 2 23* 2 23* 2 24*  < > 2 54*  < > 2 89*  < > 3 64*   EGFR ml/min/1 73sq m 31 31 31  < > 27  < > 23  < > 17   CALCIUM mg/dL 8 4 8 2* 8 1*  < > 8 3  < > 8 2*  < > 7 5*   AST U/L  --   --   --   --  277*  279*  --  551*  --  1,114*   ALT U/L  --   --   --   --  59  64  --  159*  --  323*   ALK PHOS U/L  --   --   --   --  90  88  --  79  --  80   < > = values in this interval not displayed  Results from last 7 days  Lab Units 01/30/19  0607 01/29/19  0425 01/28/19  0500   WBC Thousand/uL 26 10* 20 70* 16 14*   HEMOGLOBIN g/dL 11 1* 10 2* 10 7*   PLATELETS Thousands/uL 131* 87* 57*       Results from last 7 days  Lab Units 01/27/19  1055 01/27/19  1039 01/26/19  0502 01/24/19  1255 01/24/19  1008 01/23/19  2301 01/23/19  1850 01/23/19  1730 01/23/19  1654 01/23/19  1600   BLOOD CULTURE  No Growth at 48 hrs  No Growth at 48 hrs  Staphylococcus aureus*  Staphylococcus aureus* Staphylococcus aureus*  Staphylococcus aureus*  --   --   --   --  Staphylococcus aureus* Staphylococcus aureus*   SPUTUM CULTURE   --   --   --   --  1+ Growth of Staphylococcus aureus*  --   --   --   --   --    GRAM STAIN RESULT   --   --  Gram positive cocci in clusters  Gram positive cocci in clusters Gram positive cocci in clusters  Gram positive cocci in clusters No Polys or Bacteria seen  --   --   --  Gram positive cocci in clusters Gram positive cocci in clusters   URINE CULTURE   --   --   --   --   --   --   --  1533-2102 cfu/ml Gram Positive Cocci*  --   --    INFLUENZA B PCR   --   --   --   --   --   --  None Detected  --   --   --    RSV PCR   --   --   --   --   --   --  None Detected  --   --   --    LEGIONELLA URINARY ANTIGEN   --   --   --   --   --  Negative  --   --   --   --        Imaging Studies:   I have personally reviewed pertinent imaging study reports and images in PACS  Chest x-ray shows slight progression of congestive heart failure      EKG, Pathology, and Other Studies:   I have personally reviewed pertinent reports

## 2019-01-30 NOTE — RESPIRATORY THERAPY NOTE
RT Ventilator Management Note  Juan Alberto Ch 60 y o  male MRN: [id number]  Unit/Bed#: [facility] 11 Encounter: [id number]      Daily Screen       1/27/2019 0705 1/29/2019 0850          Patient safety screen outcome[de-identified] Failed Passed      Not Ready for Weaning due to[de-identified] Underline problem not resolved -      Spont breathing trial % for 30 min: - -      RSBI: - 65              Physical Exam:   Assessment Type: Assess only  General Appearance: Sedated  Respiratory Pattern: Assisted  Chest Assessment: Chest expansion symmetrical  Bilateral Breath Sounds: Diminished  Suction: ET Tube  O2 Device: vent      Resp Comments: con't on a/c settings 16/500/40%/+5 as ordered, blanca well, no resp distress, VS stable, will con't to monitor

## 2019-01-30 NOTE — PROGRESS NOTES
Cardiology Progress Note - Lilia Pace 60 y o  male MRN: [id number]    [facility] En[facility]: [id number]      Assessment:  Principal Problem:    Cardiogenic shock ([facility] 75 )  Active Problems:    Increased BMI    NSTEMI (non-ST elevated myocardial infarction) ([facility] )    PATRICK (acute kidney injury) ([facility] 75 )    Stress-induced cardiomyopathy    Acute respiratory failure with hypoxia ([place] [address] )    History of aortic valve replacement with bioprosthetic valve    Atrial fibrillation (HCC)    Septic shock (HCC)    Staphylococcus aureus bacteremia    Transaminitis    Thrombocytopenia (HCC)    Anemia    Leukocytosis    Plan for today  - Continue IV Amiodaron 0 5mg/hr and IV Heparin drip      Plan:  1  HFrEF- LVEF-35%, LVIDd-6 5 cm- Cardiomyopathy in the setting of Septic shock with possible component of cardiogenic shock- Biventricular failure  - Clinically does not appear to be hypervolemic and is warm  - Stable off Milrinone  - I/O-3869/5255 with net negative 1386  - Echo 1/25- Upper normal LV size with LVEF-30%, Concentric hypertrophy, Dilated RV with reduced function, Dyssynergic septum  Bioprosthetic aortic valve- not very well visualized- mean gradient of 13  - His cardiomyopathy is predominantly sepsis mediated myocardial dysfunction     2  Septic shock- with gram positive bacteremia- MSSA- unclear source  - On Cefazolin and stress dose steroids- being weaned down  - CT chest 1/23- basilar consolidative lung changes not impressive to explain the degree of his septic shock and he is not high requirements on the vent itself  - CHON with no evidence of endocarditis  - Plan for eventual spine imaging given persistent bacteremia  He does have coin shaped excoriations on the upper back- wife reports scratching his upper back- possible seeding from      3   Bioprosthetic AVR for severe degenerative AS- 25 mm Champagne Magna in July 2014  - Gradients across the valve in the past have been around 21, current gradients are 13- CHON- no endocarditis     4  Troponin elevation  - Troponins elevated to above 40, this is in the setting of septic shock  - EKG with no ischemic changes  - Normal cath in 2014  5  New onset Atrial fibrillation in the setting of sepsis  - on IV Amiodarone 0 5mg/hr- continue for today- will change to oral dosing tomorrow  - on anticoagulation with IV Heparin       6  PATRICK- in the setting of septic shock  - on CRRT     7  Transaminitis          Subjective:   Patient seen and examined  On CRRT   Off pressors since yesterday morning  Failed SBT this morning    Follows commands off sedation    Telemetry- Atrial fibrillation with ventricular rates of 70-90, intermittently upto 130-150    Objective:     Vitals: Blood pressure (!) 87/49, pulse 92, temperature 98 8 °F (37 1 °C), temperature source Oral, resp  rate 19, height 5' 9" (1 753 m), weight (!) 168 kg (371 lb 7 6 oz), SpO2 98 %  , Body mass index is 54 86 kg/m² ,   Orthostatic Blood Pressures      Most Recent Value   Blood Pressure   87/49 filed at 01/28/2019 0941   Patient Position - Orthostatic VS  Lying filed at 01/28/2019 0941            Intake/Output Summary (Last 24 hours) at 01/30/19 1116  Last data filed at 01/30/19 1100   Gross per 24 hour   Intake           3830 5 ml   Output             5742 ml   Net          -1911 5 ml         Physical Exam:    GEN: Terrence Hawkins intubated and sedated, follows commands  HEENT: anicteric, mucous membranes moist  NECK: no jvd, no carotid bruits, right and left central lines  HEART: Irregular rhythm, normal S1 and S2, no murmurs, clicks, gallops or rubs   LUNGS: clear to auscultation bilaterally in the anterior lung fields   ABDOMEN: normal bowel sounds, soft, no tenderness, no distention  EXTREMITIES: peripheral pulses 1+; bluish discoloration of the toes, warm feet  NEURO: no focal findings   SKIN: coin shaped excoriations of the upper back      Current Facility-Administered Medications:     acetaminophen (TYLENOL) oral suspension 650 mg, 650 mg, Oral, Q6H PRN, Sterling Herrera MD, 650 mg at 19 1946    albuterol inhalation solution 2 5 mg, 2 5 mg, Nebulization, Q4H PRN, Francy Landa PA-C    [COMPLETED] amiodarone 150 mg in dextrose 5 % 100 mL IV bolus, 150 mg, Intravenous, Once, Stopped at 19 1519 **FOLLOWED BY** [] amiodarone (CORDARONE) 900 mg in dextrose 5 % 500 mL infusion, 1 mg/min, Intravenous, Continuous, Stopped at 199 **FOLLOWED BY** amiodarone (CORDARONE) 900 mg in dextrose 5 % 500 mL infusion, 0 5 mg/min, Intravenous, Continuous, Alma Umanzor PA-C, Last Rate: 16 7 mL/hr at 19 0740, 0 5 mg/min at 19 0740    bisacodyl (DULCOLAX) rectal suppository 10 mg, 10 mg, Rectal, Daily PRN, Francy Landa PA-C    ceFAZolin (ANCEF) IVPB (premix) 2,000 mg, 2,000 mg, Intravenous, Q12H, Rafael Gudino PA-C, Stopped at 19 8888    chlorhexidine (PERIDEX) 0 12 % oral rinse 15 mL, 15 mL, Swish & Spit, Q12H [address], Francy Landa PA-C, 15 mL at 19 0804    dexmedetomidine (PRECEDEX) 200 mcg in sodium chloride 0 9 % 50 mL infusion, 0 1-0 7 mcg/kg/hr, Intravenous, Titrated, Sterling Herrera MD, Last Rate: 21 8 mL/hr at 19 0933, 0 6 mcg/kg/hr at 19 0933    fentaNYL 1250 mcg in sodium chloride 0 9% 125mL drip, 50 mcg/hr, Intravenous, Titrated, Olive Umanzor PA-C, Last Rate: 5 mL/hr at 19 0740, 50 mcg/hr at 19 0740    fentanyl citrate (PF) 100 MCG/2ML 50 mcg, 50 mcg, Intravenous, Q1H PRN, Sterling Herrera MD, 50 mcg at 19 0430    gabapentin (NEURONTIN) capsule 100 mg, 100 mg, Oral, HS, Donny Mejia PA-C    heparin (porcine) 25,000 units in 250 mL infusion (premix), 3-30 Units/kg/hr (Order-Specific), Intravenous, Titrated, Rafael Gudino PA-C, Last Rate: 19 mL/hr at 19 0951, 21 1 Units/kg/hr at 19 0951    hydrocortisone sodium succinate (PF) (Solu-CORTEF) injection 50 mg, 50 mg, Intravenous, Q8H [address], Olive Umanzor PA-C, 50 mg at 01/30/19 0500    hydrocortisone sodium succinate (PF) (Solu-CORTEF) injection 50 mg, 50 mg, Intravenous, Once, Donny Mejia PA-C    [START ON 1/31/2019] hydrocortisone sodium succinate (PF) (Solu-CORTEF) injection 50 mg, 50 mg, Intravenous, Q12H [address], Donny Mejia PA-C    insulin lispro (HumaLOG) 100 units/mL subcutaneous injection 5-25 Units, 5-25 Units, Subcutaneous, Q6H [address], 5 Units at 01/29/19 0607 **AND** Fingerstick Glucose (POCT), , , Q6H, Lisa Santos PA-C    NxStage K 4/Ca 3 dialysis solution (RFP-401) 20,000 mL, 20,000 mL, Dialysis, Continuous, Marilia K Magda, DO, Last Rate: 0 mL/hr at 01/30/19 1008, 20,000 mL at 01/30/19 1009    nystatin (MYCOSTATIN) powder, , Topical, BID, Lisa Santos PA-C    polyethylene glycol (MIRALAX) packet 17 g, 17 g, Oral, Daily, KATHY Mathias, 17 g at 01/30/19 0706    potassium-sodium phosphateS (K-PHOS,PHOSPHA 250) -250 mg tablet 2 tablet, 2 tablet, Oral, BID With Meals, Amanda Frazier MD, 2 tablet at 01/30/19 0751    senna 8 8 mg/5 mL oral syrup 8 8 mg, 8 8 mg, Oral, HS, Lisa Santos PA-C, 8 8 mg at 01/29/19 2132    Labs & Results:    Lab Results   Component Value Date    CKTOTAL 3,180 (H) 01/27/2019    CKTOTAL 10,421 (H) 01/26/2019    CKTOTAL 38,028 (H) 01/25/2019    CKMB 14 9 (H) 01/27/2019    CKMB 38 7 (H) 01/26/2019    CKMB 139 8 (H) 01/25/2019    CKMBINDEX <1 0 01/27/2019    CKMBINDEX <1 0 01/26/2019    CKMBINDEX <1 0 01/25/2019    TROPONINI 36 20 (H) 01/24/2019    TROPONINI 36 30 (H) 01/24/2019    TROPONINI 34 90 (H) 01/24/2019       Lab Results   Component Value Date    GLUCOSE 92 04/09/2015    CALCIUM 8 4 01/30/2019     04/09/2015    K 4 4 01/30/2019    CO2 24 01/30/2019     01/30/2019    BUN 32 (H) 01/30/2019    CREATININE 2 23 (H) 01/30/2019       Lab Results   Component Value Date    WBC 26 10 (H) 01/30/2019    HGB 11 1 (L) 01/30/2019    HCT 35 6 (L) 01/30/2019    MCV 84 01/30/2019     (L) 01/30/2019 Results from last 7 days  Lab Units 01/29/19  1057   INR  1 07       Lab Results   Component Value Date    CHOL 146 07/18/2014    CHOL 145 02/21/2014     Lab Results   Component Value Date    HDL 41 06/02/2018    HDL 52 06/27/2017     Lab Results   Component Value Date    LDLCALC 57 06/02/2018    LDLCALC 129 (H) 06/27/2017     Lab Results   Component Value Date    TRIG 102 06/02/2018    TRIG 71 06/27/2017       Lab Results   Component Value Date    ALT 59 01/27/2019    ALT 64 01/27/2019     (H) 01/27/2019     (H) 01/27/2019

## 2019-01-30 NOTE — PROGRESS NOTES
Progress Note - Nephrology   Lilia Pace 60 y o  male MRN: [id number]  [facility] En[facility]: [id number]      Assessment / Plan:  1  anuric PATRICK, baseline serum creatinine 0 7 -0 8  -PATRICK most likely secondary to ischemic/septic ATN, rhabdomyolysis  -due to progressive worsening renal failure, concern for mild volume overload, worsening acidosis, patient was started on CVVHD on 1/24/19  Hosie Hilts was changed to CVVH pre filter    -Patient seen and examined on CVVH by me today   -no further clotting issues now that patient is on systemic heparin gtt  -continue CVVH 4 potassium/3 calcium, Q sub 2 9 L/hr, 150ml/hr UF as tolerated, Qb 330ml/min  -Avoid nephrotoxins or NSAIDs  -as patient anuric, I doubt diuretic would be helpful at this time      2  Septic shock/component of cardiogenic shock, MSSA bacteremia  -CHON did not show evidence of valvular vegetations    -antibiotic as per ICU team   Now off pressors  BP improved on midodrine     3  Metabolic acidosis/lactic acidosis - resolving on CRRT  -likely secondary to worsened renal failure in the setting of septic shock      4  Mild volume overload  -UF removal as above as blood pressure tolerates  -CHF with EF 30%   Close cardiology follow-up  -urine output remains poor       5  History of aortic stenosis, status post bioprosthetic AVR in 2014     6  VDRF s/p intubation      7  Hypophosphatemia-likely due to CRRT, replete per protocol     8  Anemia of critical illness - Hgb stable in 11s, continue to monitor  Platelets improving today  Doubt TMA  Subjective:   Unable to elicit HPI or review of systems as patient is intubated  Patient seen and examined by me on CRRT at approximately 9:55 a m     Blood pressure 104/61, UF goal 150 mL/hr  Patient is on amiodarone and heparin drip  There have been no further issues with clotting on the CRRT machine        Objective:     Vitals: Blood pressure (!) 87/49, pulse 92, temperature 98 8 °F (37 1 °C), temperature source Oral, resp  rate 19, height 5' 9" (1 753 m), weight (!) 168 kg (371 lb 7 6 oz), SpO2 98 %  ,Body mass index is 54 86 kg/m²  Temp (24hrs), Av 4 °F (36 9 °C), Min:97 9 °F (36 6 °C), Max:98 8 °F (37 1 °C)      Weight (last 2 days)     Date/Time   Weight    19 0600  (!)  168 (371 48)                Intake/Output Summary (Last 24 hours) at 19 1200  Last data filed at 19 1100   Gross per 24 hour   Intake           3695 5 ml   Output             5499 ml   Net          -1803 5 ml     I/O last 24 hours: In: 4369 5 [I V :1593 5; IV Piggyback:1510; Feedings:1266]  Out: 6403 [ABOEQ:8009]        Physical Exam:   Physical Exam   Constitutional: He appears well-developed and well-nourished  No distress  HENT:   Head: Normocephalic and atraumatic  Mouth/Throat: No oropharyngeal exudate  +ETT   Eyes: Right eye exhibits no discharge  Left eye exhibits no discharge  No scleral icterus  Neck: Neck supple  Cardiovascular: Normal rate, regular rhythm and normal heart sounds  Pulmonary/Chest: Effort normal  He has no wheezes  He has no rales  Coarse BS b/l   Abdominal: Soft  Bowel sounds are normal  He exhibits no distension  There is no tenderness  Musculoskeletal: Normal range of motion  He exhibits edema  Neurological: He is alert  awake   Skin: Skin is warm and dry  No rash noted  He is not diaphoretic  Psychiatric: He has a normal mood and affect  His behavior is normal    Vitals reviewed        Invasive Devices     Central Venous Catheter Line            CVC Central Lines 19 Triple Left Internal jugular 5 days          Peripheral Intravenous Line            Peripheral IV 19 Left;Upper Arm 3 days          Arterial Line            Arterial Line 19 Right Radial 5 days          Line            Temporary HD Catheter 5 days          Drain            NG/OG/Enteral Tube Orogastric 14 Fr Right mouth 6 days          Airway            ETT  Cuffed 7 5 mm 6 days Medications:    Scheduled Meds:  Current Facility-Administered Medications:  acetaminophen 650 mg Oral Q6H PRN Cristina Smith MD    albuterol 2 5 mg Nebulization Q4H PRN Saba Peterson PA-C    amiodarone 0 5 mg/min Intravenous Continuous Robyn Dunne PA-C Last Rate: 0 5 mg/min (01/30/19 0740)   bisacodyl 10 mg Rectal Daily PRN Saba Peterson PA-C    calcium gluconate 1 G  100 mL IVPB 1 g Intravenous Once Heather Gee MD    cefazolin 2,000 mg Intravenous Q12H Rociogrady Etienne PA-C Last Rate: Stopped (01/30/19 5604)   chlorhexidine 15 mL Swish & Spit Q12H [address] Saba Peterson PA-C    dexmedetomidine 0 1-0 7 mcg/kg/hr Intravenous Titrated Cristina Smith MD Last Rate: 0 6 mcg/kg/hr (01/30/19 1129)   fentaNYL 50 mcg/hr Intravenous Titrated Robyn Dunne PA-C Last Rate: 50 mcg/hr (01/30/19 0740)   fentanyl citrate (PF) 50 mcg Intravenous Q1H PRN Cristina Smith MD    gabapentin 100 mg Oral HS Aimee Mejia PA-C    heparin (porcine) 3-30 Units/kg/hr (Order-Specific) Intravenous Titrated Rocio Etienne PA-C Last Rate: 21 1 Units/kg/hr (01/30/19 0951)   hydrocortisone sodium succinate 50 mg Intravenous Q8H [address] Olive Umanzor PA-C    hydrocortisone sodium succinate 50 mg Intravenous Once Rocio Etienne PA-C    [START ON 1/31/2019] hydrocortisone sodium succinate 50 mg Intravenous Q12H [address] Donny Mejia PA-C    insulin lispro 5-25 Units Subcutaneous Q6H [address] Alba Butler    NxStage K 4/Ca 3 20,000 mL Dialysis Continuous Ramsey Victoria DO Last Rate: 0 mL (01/30/19 1008)   nystatin  Topical BID Saba Peterson PA-C    polyethylene glycol 17 g Oral Daily KATHY Burroughs    potassium-sodium phosphateS 2 tablet Oral BID With Meals Merleen Jeans, MD senna 8 8 mg Oral HS Saba Peterson PA-C        PRN Meds:   acetaminophen    albuterol    bisacodyl    fentanyl citrate (PF)    Continuous Infusions:  amiodarone 0 5 mg/min Last Rate: 0 5 mg/min (01/30/19 0740)   dexmedetomidine 0 1-0 7 mcg/kg/hr Last Rate: 0 6 mcg/kg/hr (01/30/19 1129)   fentaNYL 50 mcg/hr Last Rate: 50 mcg/hr (01/30/19 0740)   heparin (porcine) 3-30 Units/kg/hr (Order-Specific) Last Rate: 21 1 Units/kg/hr (01/30/19 0951)   NxStage K 4/Ca 3 20,000 mL Last Rate: 0 mL (01/30/19 1008)           LAB RESULTS:        Results from last 7 days  Lab Units 01/30/19  1114 01/30/19  0607 01/29/19  2335 01/29/19  1749 01/29/19  1136 01/29/19  0605 01/29/19  0425 01/29/19  0005  01/28/19  0500  01/27/19  0512  01/26/19  0601 01/26/19  0515  01/25/19  0410 01/24/19  1613  01/23/19  1600   WBC Thousand/uL  --  26 10*  --   --   --   --  20 70*  --   --  16 14*  --  15 47*  --   --  13 48*  --  11 12* 12 29*  < > 16 01*   HEMOGLOBIN g/dL  --  11 1*  --   --   --   --  10 2*  --   --  10 7*  --  11 1*  --   --  11 8*  --  13 5 13 8  < > 14 6   HEMATOCRIT %  --  35 6*  --   --   --   --  32 5*  --   --  33 2*  --  35 1*  --   --  36 8  --  42 5 44 2  < > 47 0   PLATELETS Thousands/uL  --  131*  --   --   --   --  87*  --   --  57*  --  46*  --   --  46*  --  57* 64*  < > 112*   NEUTROS PCT %  --   --   --   --   --   --   --   --   --   --   --  82*  --   --  86*  --  86*  --   --  94*   LYMPHS PCT %  --   --   --   --   --   --   --   --   --   --   --  4*  --   --  3*  --  3*  --   --  2*   LYMPHO PCT %  --  4*  --   --   --   --  1*  --   --  1*  --   --   --   --   --   --   --   --   --   --    MONOS PCT %  --   --   --   --   --   --   --   --   --   --   --  9  --   --  10  --  9  --   --  3*   MONO PCT %  --  1*  --   --   --   --  5  --   --  4  --   --   --   --   --   --   --   --   --   --    EOS PCT %  --  0  --   --   --   --  0  --   --  0  --  0  --   --  0  --  0  --   --  0   POTASSIUM mmol/L 4 2 4 4 4 1 3 7 4 0 4 3  --  4 1  < >  --   < > 4 1  < > 4 2  --   < > 3 7 3 9  < > 3 9   CHLORIDE mmol/L 105 106 106 106 106 107  --  105  < >  --   < > 107  < > 107  --   < > 104 107  < > 99*   CO2 mmol/L 24 24 24 24 24 25  --  25  < >  --   < > 22  < > 21  --   < > 20* 17*  < > 25   BUN mg/dL 31* 32* 30* 32* 33* 34*  --  33*  < >  --   < > 33*  < > 36*  --   < > 45* 52*  < > 34*   CREATININE mg/dL 2 18* 2 23* 2 23* 2 24* 2 22* 2 24*  --  2 26*  < >  --   < > 2 54*  < > 2 89*  --   < > 3 64* 4 03*  < > 2 63*   CALCIUM mg/dL 8 3 8 4 8 2* 8 1* 8 2* 8 2*  --  8 1*  < >  --   < > 8 3  < > 8 2*  --   < > 7 5* 6 6*  < > 8 0*   ALK PHOS U/L  --   --   --   --   --   --   --   --   --   --   --  90  88  --  79  --   --  80 73  --  89   ALT U/L  --   --   --   --   --   --   --   --   --   --   --  59  64  --  159*  --   --  323* 344*  --  90*   AST U/L  --   --   --   --   --   --   --   --   --   --   --  277*  279*  --  551*  --   --  1,114* 1,513*  --  242*   MAGNESIUM mg/dL 2 1 2 2 1 9 2 1 1 9 2 1  --  2 0  < >  --   < >  --   < > 2 1  --   < > 2 0 2 2  < > 2 0   PHOSPHORUS mg/dL 2 8 2 8 2 6* 2 5* 2 4* 2 4*  --  2 6*  < >  --   < >  --   < > 2 4*  --   < > 3 1 4 6*  < >  --    < > = values in this interval not displayed  CUTURES:  Lab Results   Component Value Date    BLOODCX No Growth at 48 hrs  01/27/2019    BLOODCX No Growth at 48 hrs  01/27/2019    BLOODCX Staphylococcus aureus (A) 01/26/2019    BLOODCX Staphylococcus aureus (A) 01/26/2019    BLOODCX Staphylococcus aureus (A) 01/24/2019    BLOODCX Staphylococcus aureus (A) 01/24/2019    BLOODCX Staphylococcus aureus (A) 01/23/2019    URINECX 6511-1039 cfu/ml Gram Positive Cocci (A) 01/23/2019                 Portions of the record may have been created with voice recognition software  Occasional wrong word or "sound a like" substitutions may have occurred due to the inherent limitations of voice recognition software  Read the chart carefully and recognize, using context, where substitutions have occurred  If you have any questions, please contact the dictating provider

## 2019-01-30 NOTE — RESPIRATORY THERAPY NOTE
RT Ventilator Management Note  Terrence Hawkins 60 y o  male MRN: [id number]  Unit/Bed#: [facility] 11 Encounter: [id number]      Daily Screen       1/27/2019 0705 1/29/2019 0850          Patient safety screen outcome[de-identified] Failed Passed      Not Ready for Weaning due to[de-identified] Underline problem not resolved -      Spont breathing trial % for 30 min: - -      RSBI: - 65              Physical Exam:   Assessment Type: (P) Assess only  General Appearance: (P) Sedated  Respiratory Pattern: (P) Assisted  Chest Assessment: (P) Chest expansion symmetrical  Bilateral Breath Sounds: (P) Clear, Diminished  R Breath Sounds: (P) Clear  L Breath Sounds: Diminished, Clear      Resp Comments: (P) Pt remains on AC settings and appears to be comfortable  VS are currently stable  Will continue to monitor

## 2019-01-30 NOTE — RESPIRATORY THERAPY NOTE
RT Ventilator Management Note  Lilia Pace 60 y o  male MRN: [id number]  Unit/Bed#: [facility] 11 Encounter: [id number]      Daily Screen       1/30/2019 0730 1/30/2019 4036          Patient safety screen outcome[de-identified] Passed -      Spont breathing trial outcome[de-identified] - Failed      Spont breathing trial reason failed: - RR > 35 bpm      Previous settings resumed: - Yes      Name of Medical Team Notified[de-identified] - R N  NOTIFIED  Physical Exam:   Assessment Type: Assess only  General Appearance: Awake  Respiratory Pattern: Assisted, Tachypneic  Chest Assessment: Chest expansion symmetrical  Bilateral Breath Sounds: Diminished, Coarse  R Breath Sounds: Diminished, Coarse  L Breath Sounds: Diminished, Coarse  Cough: None  Suction: ET Tube  O2 Device: ventilator      Resp Comments: Pt tachypneic at times on current vent settings  No vent changes at this time  Will continue to monitor and assess per respiratory protocol

## 2019-01-30 NOTE — PROGRESS NOTES
Progress Note - Critical Care   Juju Thomas 60 y o  male MRN: [id number]  [facility] En[facility]: [id number]    Attending Physician: Margarita Kelly, DO      ______________________________________________________________________  Assessment and Plan:   Principal Problem:    Cardiogenic shock ([facility] )  Active Problems:    Increased BMI    NSTEMI (non-ST elevated myocardial infarction) ([facility] )    PATRICK (acute kidney injury) ([facility] )    Stress-induced cardiomyopathy    [facility]    History of aortic valve replacement with bioprosthetic valve    Atrial fibrillation ([facility] )    Septic shock (HCC)    Staphylococcus aureus bacteremia    Transaminitis    Thrombocytopenia (HCC)    Anemia  Resolved Problems:    Acute encephalopathy    Rhabdomyolysis    48-year-old admitted with shock of multifactorial etiology with PATRICK and newly diagnosed AFib with RVR     Neuro:   Toxic metabolic encephalopathy  - patient is awake today, following commands    -Sedation & Analgesia: Precedex and Fentanyl   Will try weaning of fentanyl and continue sedation with precedex  Neuro checks as per unit protocol  Take precautions to prevent ICU delirium   Monitor GCS        CV:   Shock, likely multifactorial, pressor requirements improved  - Off Milrinone, Levophed and Vasopressin  - Hydrocortisone decreased to 50 mg q 8 with MAP  > 65   -Continue to monitor with MAP goal >65      New onset A fib with RVR   - Amiodarone drip   - A fib with HR is the mid 90's to 120's   - On Heparin drip for [facility]  - per Cardiology, consider a repeat TTE when clinically improved  - continue tele monitoring     NSTEMI type 2 2/2 shock  - Continue to hold ASA, statin, BB 2/2 shock and thrombocytopenia   Continuous telemetry      Pulm:   Acute hypoxic respiratory failure  - Intubated on vent settings 16/500/40%/5  - SPO2 goal > 92%   -will try to wean off ventilator as tolerated  -monitor secretions        GI:   Shock liver vs hepatic congestion  · GI ppx: not indicated, pt on TF  · Bowel regimen: Dulcolax, senna     :   PATRICK likely 2/2 ischemia vs Septic ATN vs Rhabdo  · CVVH running @ -100   · UOP -anuric   · Monitor I&Os, net positive 340 over past 24H  · Cr 2 23, GFR 31  · CK improving will repeat in 3 days  · Nephrology following        F/E/N:  1  Fluids/Electrolytes  · Will continue  Electrolytes and Replete as needed       2   Nutrition: TF at goal of 52        ID:   Unknown source of infection: MSSA bacteremia , likely from wounds on the back  WBC  increased likely in the setting of steroid use vs infection  Bcx 1/27 negative at 48 hr  CHON negative for vegetation, EF  30%  Day # 7 of Ancef, renally dosed   Trend fever curve and WBC   Per ID, will consider spine imaging         Heme:   Thrombocytopenia likely consumptive, improving  Hbg- 11 1, will continue to trend CBC  tranfuse as needed with goal >7  DVT ppx: On heparin drip for AC  Continue SCDs     Endo:   Blood sugars well controlled, 116-141 over the last 24 hrs  Goal of 140-180  Will continue to monitor  SSI     · Msk/Skin:   · Turn/position 2 hourly  · Wound care   · PT/OT when appropriate      Disposition:Continue ICU stay    Code Status: Level 1 - Full Code    Counseling / Coordination of Care  Total Critical Care time spent 30 minutes excluding procedures, teaching and family updates  ______________________________________________________________________    Chief Complaint: Intubated    24 Hour Events:   Pt complained of pain and required a dose of Oxycodone  He also reports burning pain in his extremities  ______________________________________________________________________    Physical Exam:   Physical Exam   Constitutional: He appears well-developed and well-nourished  HENT:   Head: Normocephalic and atraumatic  Eyes: Pupils are equal, round, and reactive to light   Conjunctivae are normal    Neck:   R IJ   Cardiovascular:   Irregularly irregular Pulmonary/Chest:   Decreased BS   Abdominal: Soft  Bowel sounds are normal  There is no tenderness  Musculoskeletal: He exhibits edema  Neurological: He is alert  Follows commands  GCS 11- E4/V1/M6   Skin: Skin is warm and dry  scabbed wounds lateral R leg, ecchymosis left arm   Vitals reviewed  ______________________________________________________________________  Vitals:    19 0400 19 0500 19 0600 19 0700   BP:       BP Location:       Pulse: 88 92 104 92   Resp:    Temp: 98 7 °F (37 1 °C)      TempSrc: Oral      SpO2: 98% 94% 92% 96%   Weight:   (!) 168 kg (371 lb 7 6 oz)    Height:           Temperature:   Temp (24hrs), Av 2 °F (36 8 °C), Min:97 9 °F (36 6 °C), Max:98 7 °F (37 1 °C)    Current Temperature: 98 7 °F (37 1 °C)  Weights:   IBW: 70 7 kg    Body mass index is 54 86 kg/m²    Weight (last 2 days)     Date/Time   Weight    19 0600  (!)  168 (371 48)            Hemodynamic Monitoring:  N/A     Non-Invasive/Invasive Ventilation Settings:  Respiratory    Lab Data (Last 4 hours)    None         O2/Vent Data (Last 4 hours)       0349           Vent Mode AC/VC       Resp Rate (BPM) (BPM) 16       Vt (mL) (mL) 500       FIO2 (%) (%) 40       PEEP (cmH2O) (cmH2O) 5       MV 10 6                 No results found for: PHART, OJV5WVE, PO2ART, EKL2ETJ, S9OYBRBQ, BEART, SOURCE    Intake and Outputs:  I/O       701 -  07 -  07    I V  (mL/kg) 1794 1 (10 8) 1340 5 (8)    NG/GT 50     IV Piggyback 994 1339    Feedings 1133 479    Total Intake(mL/kg) 3971 1 (23 9) 3158 5 (18 8)    Other 4082 5045    Total Output 4082 5045    Net -110 9 -1886 5          Unmeasured Stool Occurrence 4 x 1 x          Nutrition:        Diet Orders            Start     Ordered    19 0848  Diet Enteral/Parenteral; Tube Feeding No Oral Diet; Nepro; Continuous; 52  Diet effective now     Question Answer Comment   Diet Type Enteral/Parenteral    Enteral/Parenteral Tube Feeding No Oral Diet    Tube Feeding Formula: Nepro    Bolus/Cyclic/Continuous Continuous    Tube Feeding Goal Rate (mL/hr): 52    RD to adjust diet per protocol? Yes        01/26/19 0848          Labs:     Results from last 7 days  Lab Units 01/30/19  0607 01/29/19  0425 01/28/19  0500 01/27/19  0512 01/26/19  0515 01/25/19  0410   WBC Thousand/uL 26 10* 20 70* 16 14* 15 47* 13 48* 11 12*   HEMOGLOBIN g/dL 11 1* 10 2* 10 7* 11 1* 11 8* 13 5   HEMATOCRIT % 35 6* 32 5* 33 2* 35 1* 36 8 42 5   PLATELETS Thousands/uL 131* 87* 57* 46* 46* 57*   NEUTROS PCT %  --   --   --  82* 86* 86*   MONOS PCT %  --   --   --  9 10 9   MONO PCT %  --  5 4  --   --   --        Results from last 7 days  Lab Units 01/30/19  0607 01/29/19  2335 01/29/19  1749  01/27/19  0512  01/26/19  0601  01/25/19  0410   POTASSIUM mmol/L 4 4 4 1 3 7  < > 4 1  < > 4 2  < > 3 7   CHLORIDE mmol/L 106 106 106  < > 107  < > 107  < > 104   CO2 mmol/L 24 24 24  < > 22  < > 21  < > 20*   BUN mg/dL 32* 30* 32*  < > 33*  < > 36*  < > 45*   CREATININE mg/dL 2 23* 2 23* 2 24*  < > 2 54*  < > 2 89*  < > 3 64*   CALCIUM mg/dL 8 4 8 2* 8 1*  < > 8 3  < > 8 2*  < > 7 5*   ALK PHOS U/L  --   --   --   --  90  88  --  79  --  80   ALT U/L  --   --   --   --  59  64  --  159*  --  323*   AST U/L  --   --   --   --  277*  279*  --  551*  --  1,114*   < > = values in this interval not displayed  Results from last 7 days  Lab Units 01/30/19  0607 01/29/19  2335 01/29/19  1749   MAGNESIUM mg/dL 2 2 1 9 2 1     Lab Results   Component Value Date    PHOS 2 8 01/30/2019    PHOS 2 6 (L) 01/29/2019    PHOS 2 5 (L) 01/29/2019        Results from last 7 days  Lab Units 01/30/19  0607 01/29/19  2335 01/29/19  1652 01/29/19  1057  01/27/19  0555  01/25/19  0410   INR   --   --   --  1 07  --  0 95  --  0 94   PTT seconds 50* 42* 31 33  < > 28  < >  --    < > = values in this interval not displayed      0  Lab Value Date/Time TROPONINI 36 20 (H) 01/24/2019 2137   TROPONINI 36 30 (H) 01/24/2019 1831   TROPONINI 34 90 (H) 01/24/2019 1613   TROPONINI >40 00 (HH) 01/24/2019 0502   TROPONINI >40 00 (HH) 01/24/2019 0207   TROPONINI 36 61 (HH) 01/23/2019 2251   TROPONINI 24 29 (HH) 01/23/2019 1850   TROPONINI 14 20 (HH) 01/23/2019 1600       Results from last 7 days  Lab Units 01/24/19  1613 01/24/19  0502 01/24/19  0132   LACTIC ACID mmol/L 1 3 2 5* 2 2*     ABG:  Lab Results   Component Value Date    PHART 7 477 (H) 01/26/2019    ELA0HRE 29 6 (LL) 01/26/2019    PO2ART 71 7 (L) 01/26/2019    BTF7CJQ 21 4 (L) 01/26/2019    BEART -1 1 01/26/2019    SOURCE Line, Arterial 01/26/2019     Imaging:  CXR 1/29:Slight progression of congestive heart failure    Fluid and/or infiltrate left lower lobe  Micro:  Lab Results   Component Value Date    BLOODCX No Growth at 48 hrs  01/27/2019    BLOODCX No Growth at 48 hrs  01/27/2019    BLOODCX Staphylococcus aureus (A) 01/26/2019    URINECX 5330-2216 cfu/ml Gram Positive Cocci (A) 01/23/2019    SPUTUMCULTUR 1+ Growth of Staphylococcus aureus (A) 01/24/2019     Allergies:    Allergies   Allergen Reactions    Penicillins Rash     However, has subsequently tolerated Cefazolin and Cefepime     Medications:   Scheduled Meds:    Current Facility-Administered Medications:  acetaminophen 650 mg Oral Q6H PRN Wilian Vo MD    albuterol 2 5 mg Nebulization Q4H PRN CECILE Dacosta-C    amiodarone 0 5 mg/min Intravenous Continuous Dat Powell PA-ANA Last Rate: 0 5 mg/min (01/29/19 1900)   bisacodyl 10 mg Rectal Daily PRN CECILE Dacosta-ANA    calcium gluconate 1 G  100 mL IVPB 1 g Intravenous Once Vignesh Guo MD    cefazolin 2,000 mg Intravenous Q12H [place] Jerad PA-C Last Rate: Stopped (01/29/19 7665)   chlorhexidine 15 mL Swish & Spit Q12H [facility] Sara Blanton PA-C    dexmedetomidine 0 1-0 7 mcg/kg/hr Intravenous Titrated Wilian Vo MD Last Rate: 0 7 mcg/kg/hr (01/30/19 4967) fentaNYL 50 mcg/hr Intravenous Titrated Tammie Thurman PA-C Last Rate: 50 mcg/hr (01/29/19 1900)   fentanyl citrate (PF) 50 mcg Intravenous Q1H PRN Horacio Benton MD    heparin (porcine) 3-20 Units/kg/hr (Order-Specific) Intravenous Titrated Tammie Thurman PA-C Last Rate: 19 1 Units/kg/hr (01/30/19 0500)   hydrocortisone sodium succinate 50 mg Intravenous Q8H [address] Olive Umanzor PA-C    insulin lispro 5-25 Units Subcutaneous Q6H [address] Vita Raines PA-C    norepinephrine 1-30 mcg/min Intravenous Titrated Vita Raines PA-C Last Rate: Stopped (01/29/19 0435)   NxStage K 4/Ca 3 20,000 mL Dialysis Continuous Marilia K Magda, DO Last Rate: 0 mL (01/29/19 1320)   nystatin  Topical BID Vita Raines PA-C    polyethylene glycol 17 g Oral Daily KATHY Li    potassium-sodium phosphateS 2 tablet Oral BID With Meals Grabiel Niño MD    senna 8 8 mg Oral HS Vita Raines PA-C    vasopressin (PITRESSIN) in 0 9 % sodium chloride 100 mL 0 04 Units/min Intravenous Continuous KATHY Li Last Rate: Stopped (01/29/19 1000)     Continuous Infusions:    amiodarone 0 5 mg/min Last Rate: 0 5 mg/min (01/29/19 1900)   dexmedetomidine 0 1-0 7 mcg/kg/hr Last Rate: 0 7 mcg/kg/hr (01/30/19 0500)   fentaNYL 50 mcg/hr Last Rate: 50 mcg/hr (01/29/19 1900)   heparin (porcine) 3-20 Units/kg/hr (Order-Specific) Last Rate: 19 1 Units/kg/hr (01/30/19 0500)   norepinephrine 1-30 mcg/min Last Rate: Stopped (01/29/19 0435)   NxStage K 4/Ca 3 20,000 mL Last Rate: 0 mL (01/29/19 1320)   vasopressin (PITRESSIN) in 0 9 % sodium chloride 100 mL 0 04 Units/min Last Rate: Stopped (01/29/19 1000)     PRN Meds:    acetaminophen 650 mg Q6H PRN   albuterol 2 5 mg Q4H PRN   bisacodyl 10 mg Daily PRN   fentanyl citrate (PF) 50 mcg Q1H PRN     VTE Pharmacologic Prophylaxis: Heparin Drip  VTE Mechanical Prophylaxis: sequential compression device  Invasive lines and devices:   Invasive Devices     Central Venous Catheter Line            CVC Central Lines 01/24/19 Triple Left Internal jugular 5 days          Peripheral Intravenous Line            Peripheral IV 01/27/19 Left;Upper Arm 2 days    Peripheral IV 01/27/19 Right;Medial Antecubital 2 days          Arterial Line            Arterial Line 01/24/19 Right Radial 5 days          Line            Temporary HD Catheter 5 days          Drain            NG/OG/Enteral Tube Orogastric 14 Fr Right mouth 6 days          Airway            ETT  Cuffed 7 5 mm 6 days                     Portions of the record may have been created with voice recognition software  Occasional wrong word or "sound a like" substitutions may have occurred due to the inherent limitations of voice recognition software  Read the chart carefully and recognize, using context, where substitutions have occurred      Anna Mai MD

## 2019-01-30 NOTE — RESPIRATORY THERAPY NOTE
01/30/19 0730   Vent Information   Vent ID [id number]   Vent type     Vent Mode CPAP/PS Spont  (M D PLACED PT  ON PS8)   $ Vent Daily Charge-Subsequent Yes   CPAP/PS Spont Settings   FIO2 (%) 40 %   PEEP (cmH2O) 5 cmH2O   Pressure Support (cmH2O) 8 cmH20   Trigger Sensitivity Pressure (cm H2O)  3 cm H2O   Rise Time (%) 50 %   Esens % 25 %   Humidification Heater   Heater Temp 98 6 °F (37 °C)   CPAP/PS Spont Actuals   Resp Rate (BPM) 20 BPM   VT (mL) 509 mL   MV (Obs) 10 4   MAP (cmH2O) 7 4 cmH2O   Peak Pressure (cmH2O) 14 cmH2O   I/E Ratio (Obs) 1:2 7   Heater Temperature (Obs) 98 6 °F (37 °C)   CPAP/PS Spont Alarms   High Peak Pressure (cmH20) 50 cmH2O   High Resp Rate (BPM) 45 BPM   High MV (L/min) 20 L/min   Low MV (L/min) 4 L/min   High Aster VTE (mL) 1000 mL   Low Aster VTE (mL) 400 mL   High SPONT VTE (mL) 1000 mL   Low Spont VTE (mL) 300 mL   CPAP/PS Spont Apnea Settings   Resp Rate (BPM) 16 BPM   VT (mL) 500 mL   FIO2 (%) 100 %   Apnea Time (s) 20 S   Apnea Flow (LPM) 60 LPM   Maintenance   Alarm (pink) cable attached Yes   Resuscitation bag with peep valve at bedside No   Water bag changed No   Circuit changed No   Daily Screen   Patient safety screen outcome: Passed   IHI Ventilator Associated Pneumonia Bundle   Daily Assessment of Readiness to Extubate Yes   Head of Bed Elevated HOB 30   ETT  Cuffed 7 5 mm   Placement Date/Time: 01/23/19 1953   Preoxygenated: Yes  Type: Cuffed  Tube Size: 7 5 mm  Laryngoscope:  Mac  Insertion attempts: 1  Secured at (cm): 24 at lip  Placed By: [employer]er   Secured at (cm) 25   Measured from [facility] Location Left   Repositioned Center to Left   Secured by Commercial tube rosales   Site Condition Dry   HI-LO Suction  Intermittent suction   HI-LO Secretions Scant   HI-LO Intervention Patent   RT Ventilator Management Note  Ancil Hams 60 y o  male MRN: [id number]  Unit/Bed#: [facility] 11 Encounter: [id number]      Daily Screen       1/29/2019 0850 1/30/2019 0730          Patient safety screen outcome[de-identified] Passed Passed      Spont breathing trial % for 30 min: - -      RSBI: 65 -              Physical Exam:   Assessment Type: Assess only  General Appearance: Sedated  Respiratory Pattern: Assisted  Chest Assessment: Chest expansion symmetrical  Bilateral Breath Sounds: Clear  Cough: None  Suction: ET Tube  O2 Device: vent      Resp Comments: M D  PLACED PT  ON PS8

## 2019-01-31 LAB
ANION GAP SERPL CALCULATED.3IONS-SCNC: 6 MMOL/L (ref 4–13)
ANION GAP SERPL CALCULATED.3IONS-SCNC: 7 MMOL/L (ref 4–13)
ANION GAP SERPL CALCULATED.3IONS-SCNC: 7 MMOL/L (ref 4–13)
ANION GAP SERPL CALCULATED.3IONS-SCNC: 8 MMOL/L (ref 4–13)
ANISOCYTOSIS BLD QL SMEAR: PRESENT
APTT PPP: 82 SECONDS (ref 26–38)
APTT PPP: 89 SECONDS (ref 26–38)
ATRIAL RATE: 146 BPM
ATRIAL RATE: 92 BPM
BASOPHILS # BLD MANUAL: 0 THOUSAND/UL (ref 0–0.1)
BASOPHILS NFR MAR MANUAL: 0 % (ref 0–1)
BUN SERPL-MCNC: 30 MG/DL (ref 5–25)
BUN SERPL-MCNC: 31 MG/DL (ref 5–25)
BUN SERPL-MCNC: 33 MG/DL (ref 5–25)
BUN SERPL-MCNC: 33 MG/DL (ref 5–25)
CA-I BLD-SCNC: 1.1 MMOL/L (ref 1.12–1.32)
CA-I BLD-SCNC: 1.13 MMOL/L (ref 1.12–1.32)
CA-I BLD-SCNC: 1.14 MMOL/L (ref 1.12–1.32)
CALCIUM SERPL-MCNC: 8.1 MG/DL (ref 8.3–10.1)
CALCIUM SERPL-MCNC: 8.2 MG/DL (ref 8.3–10.1)
CALCIUM SERPL-MCNC: 8.3 MG/DL (ref 8.3–10.1)
CALCIUM SERPL-MCNC: 8.5 MG/DL (ref 8.3–10.1)
CHLORIDE SERPL-SCNC: 105 MMOL/L (ref 100–108)
CHLORIDE SERPL-SCNC: 105 MMOL/L (ref 100–108)
CHLORIDE SERPL-SCNC: 106 MMOL/L (ref 100–108)
CHLORIDE SERPL-SCNC: 106 MMOL/L (ref 100–108)
CO2 SERPL-SCNC: 21 MMOL/L (ref 21–32)
CO2 SERPL-SCNC: 24 MMOL/L (ref 21–32)
CO2 SERPL-SCNC: 25 MMOL/L (ref 21–32)
CO2 SERPL-SCNC: 25 MMOL/L (ref 21–32)
CREAT SERPL-MCNC: 2.1 MG/DL (ref 0.6–1.3)
CREAT SERPL-MCNC: 2.13 MG/DL (ref 0.6–1.3)
CREAT SERPL-MCNC: 2.2 MG/DL (ref 0.6–1.3)
CREAT SERPL-MCNC: 2.25 MG/DL (ref 0.6–1.3)
EOSINOPHIL # BLD MANUAL: 0 THOUSAND/UL (ref 0–0.4)
EOSINOPHIL NFR BLD MANUAL: 0 % (ref 0–6)
ERYTHROCYTE [DISTWIDTH] IN BLOOD BY AUTOMATED COUNT: 18.1 % (ref 11.6–15.1)
GFR SERPL CREATININE-BSD FRML MDRD: 31 ML/MIN/1.73SQ M
GFR SERPL CREATININE-BSD FRML MDRD: 32 ML/MIN/1.73SQ M
GFR SERPL CREATININE-BSD FRML MDRD: 33 ML/MIN/1.73SQ M
GFR SERPL CREATININE-BSD FRML MDRD: 33 ML/MIN/1.73SQ M
GLUCOSE SERPL-MCNC: 106 MG/DL (ref 65–140)
GLUCOSE SERPL-MCNC: 113 MG/DL (ref 65–140)
GLUCOSE SERPL-MCNC: 113 MG/DL (ref 65–140)
GLUCOSE SERPL-MCNC: 128 MG/DL (ref 65–140)
GLUCOSE SERPL-MCNC: 130 MG/DL (ref 65–140)
GLUCOSE SERPL-MCNC: 131 MG/DL (ref 65–140)
GLUCOSE SERPL-MCNC: 136 MG/DL (ref 65–140)
GLUCOSE SERPL-MCNC: 154 MG/DL (ref 65–140)
GLUCOSE SERPL-MCNC: 156 MG/DL (ref 65–140)
HCT VFR BLD AUTO: 34.1 % (ref 36.5–49.3)
HGB BLD-MCNC: 10.5 G/DL (ref 12–17)
HYPERCHROMIA BLD QL SMEAR: PRESENT
LYMPHOCYTES # BLD AUTO: 10 % (ref 14–44)
LYMPHOCYTES # BLD AUTO: 2.1 THOUSAND/UL (ref 0.6–4.47)
MAGNESIUM SERPL-MCNC: 1.9 MG/DL (ref 1.6–2.6)
MAGNESIUM SERPL-MCNC: 1.9 MG/DL (ref 1.6–2.6)
MAGNESIUM SERPL-MCNC: 2 MG/DL (ref 1.6–2.6)
MAGNESIUM SERPL-MCNC: 2.2 MG/DL (ref 1.6–2.6)
MCH RBC QN AUTO: 26.2 PG (ref 26.8–34.3)
MCHC RBC AUTO-ENTMCNC: 30.8 G/DL (ref 31.4–37.4)
MCV RBC AUTO: 85 FL (ref 82–98)
METAMYELOCYTES NFR BLD MANUAL: 3 % (ref 0–1)
MICROCYTES BLD QL AUTO: PRESENT
MONOCYTES # BLD AUTO: 1.47 THOUSAND/UL (ref 0–1.22)
MONOCYTES NFR BLD: 7 % (ref 4–12)
MYELOCYTES NFR BLD MANUAL: 3 % (ref 0–1)
NEUTROPHILS # BLD MANUAL: 16.2 THOUSAND/UL (ref 1.85–7.62)
NEUTS BAND NFR BLD MANUAL: 3 % (ref 0–8)
NEUTS SEG NFR BLD AUTO: 74 % (ref 43–75)
NRBC BLD AUTO-RTO: 0 /100 WBCS
P AXIS: 43 DEGREES
PHOSPHATE SERPL-MCNC: 2.8 MG/DL (ref 2.7–4.5)
PHOSPHATE SERPL-MCNC: 2.9 MG/DL (ref 2.7–4.5)
PHOSPHATE SERPL-MCNC: 3.1 MG/DL (ref 2.7–4.5)
PHOSPHATE SERPL-MCNC: 3.3 MG/DL (ref 2.7–4.5)
PLATELET # BLD AUTO: 149 THOUSANDS/UL (ref 149–390)
PLATELET BLD QL SMEAR: ADEQUATE
PMV BLD AUTO: 12.3 FL (ref 8.9–12.7)
POTASSIUM SERPL-SCNC: 4.3 MMOL/L (ref 3.5–5.3)
POTASSIUM SERPL-SCNC: 4.5 MMOL/L (ref 3.5–5.3)
POTASSIUM SERPL-SCNC: 4.5 MMOL/L (ref 3.5–5.3)
POTASSIUM SERPL-SCNC: 4.6 MMOL/L (ref 3.5–5.3)
PR INTERVAL: 213 MS
QRS AXIS: 102 DEGREES
QRS AXIS: 95 DEGREES
QRSD INTERVAL: 158 MS
QRSD INTERVAL: 158 MS
QT INTERVAL: 358 MS
QT INTERVAL: 571 MS
QTC INTERVAL: 558 MS
QTC INTERVAL: 707 MS
RBC # BLD AUTO: 4.01 MILLION/UL (ref 3.88–5.62)
RBC MORPH BLD: PRESENT
SODIUM SERPL-SCNC: 135 MMOL/L (ref 136–145)
SODIUM SERPL-SCNC: 136 MMOL/L (ref 136–145)
SODIUM SERPL-SCNC: 136 MMOL/L (ref 136–145)
SODIUM SERPL-SCNC: 138 MMOL/L (ref 136–145)
T WAVE AXIS: -65 DEGREES
T WAVE AXIS: 69 DEGREES
VENTRICULAR RATE: 146 BPM
VENTRICULAR RATE: 92 BPM
WBC # BLD AUTO: 21.04 THOUSAND/UL (ref 4.31–10.16)

## 2019-01-31 PROCEDURE — 94760 N-INVAS EAR/PLS OXIMETRY 1: CPT

## 2019-01-31 PROCEDURE — 90945 DIALYSIS ONE EVALUATION: CPT | Performed by: INTERNAL MEDICINE

## 2019-01-31 PROCEDURE — 85007 BL SMEAR W/DIFF WBC COUNT: CPT | Performed by: PHYSICIAN ASSISTANT

## 2019-01-31 PROCEDURE — 83735 ASSAY OF MAGNESIUM: CPT | Performed by: INTERNAL MEDICINE

## 2019-01-31 PROCEDURE — 85027 COMPLETE CBC AUTOMATED: CPT | Performed by: PHYSICIAN ASSISTANT

## 2019-01-31 PROCEDURE — 82948 REAGENT STRIP/BLOOD GLUCOSE: CPT

## 2019-01-31 PROCEDURE — 85730 THROMBOPLASTIN TIME PARTIAL: CPT | Performed by: INTERNAL MEDICINE

## 2019-01-31 PROCEDURE — 93005 ELECTROCARDIOGRAM TRACING: CPT

## 2019-01-31 PROCEDURE — 90945 DIALYSIS ONE EVALUATION: CPT

## 2019-01-31 PROCEDURE — 99291 CRITICAL CARE FIRST HOUR: CPT | Performed by: INTERNAL MEDICINE

## 2019-01-31 PROCEDURE — 99232 SBSQ HOSP IP/OBS MODERATE 35: CPT | Performed by: INTERNAL MEDICINE

## 2019-01-31 PROCEDURE — 94003 VENT MGMT INPAT SUBQ DAY: CPT

## 2019-01-31 PROCEDURE — 93010 ELECTROCARDIOGRAM REPORT: CPT | Performed by: INTERNAL MEDICINE

## 2019-01-31 PROCEDURE — 99233 SBSQ HOSP IP/OBS HIGH 50: CPT | Performed by: INTERNAL MEDICINE

## 2019-01-31 PROCEDURE — 84100 ASSAY OF PHOSPHORUS: CPT | Performed by: INTERNAL MEDICINE

## 2019-01-31 PROCEDURE — 82330 ASSAY OF CALCIUM: CPT | Performed by: INTERNAL MEDICINE

## 2019-01-31 PROCEDURE — 80048 BASIC METABOLIC PNL TOTAL CA: CPT | Performed by: INTERNAL MEDICINE

## 2019-01-31 RX ORDER — METOPROLOL TARTRATE 5 MG/5ML
5 INJECTION INTRAVENOUS EVERY 6 HOURS
Status: DISCONTINUED | OUTPATIENT
Start: 2019-01-31 | End: 2019-01-31

## 2019-01-31 RX ORDER — POLYVINYL ALCOHOL 14 MG/ML
1 SOLUTION/ DROPS OPHTHALMIC
Status: DISCONTINUED | OUTPATIENT
Start: 2019-01-31 | End: 2019-03-16 | Stop reason: HOSPADM

## 2019-01-31 RX ORDER — METOPROLOL TARTRATE 5 MG/5ML
2.5 INJECTION INTRAVENOUS EVERY 6 HOURS
Status: DISCONTINUED | OUTPATIENT
Start: 2019-01-31 | End: 2019-01-31

## 2019-01-31 RX ORDER — MAGNESIUM SULFATE 1 G/100ML
1 INJECTION INTRAVENOUS ONCE
Status: COMPLETED | OUTPATIENT
Start: 2019-01-31 | End: 2019-01-31

## 2019-01-31 RX ORDER — METOPROLOL TARTRATE 5 MG/5ML
2.5 INJECTION INTRAVENOUS EVERY 6 HOURS
Status: DISCONTINUED | OUTPATIENT
Start: 2019-01-31 | End: 2019-02-01

## 2019-01-31 RX ADMIN — CALCIUM GLUCONATE 1 G: 98 INJECTION, SOLUTION INTRAVENOUS at 12:59

## 2019-01-31 RX ADMIN — FENTANYL CITRATE 50 MCG: 50 INJECTION, SOLUTION INTRAMUSCULAR; INTRAVENOUS at 20:50

## 2019-01-31 RX ADMIN — CHLORHEXIDINE GLUCONATE 0.12% ORAL RINSE 15 ML: 1.2 LIQUID ORAL at 08:11

## 2019-01-31 RX ADMIN — DIBASIC SODIUM PHOSPHATE, MONOBASIC POTASSIUM PHOSPHATE AND MONOBASIC SODIUM PHOSPHATE 2 TABLET: 852; 155; 130 TABLET ORAL at 11:10

## 2019-01-31 RX ADMIN — FENTANYL CITRATE 50 MCG: 50 INJECTION, SOLUTION INTRAMUSCULAR; INTRAVENOUS at 22:35

## 2019-01-31 RX ADMIN — CEFAZOLIN SODIUM 2000 MG: 2 SOLUTION INTRAVENOUS at 20:35

## 2019-01-31 RX ADMIN — NYSTATIN 1 APPLICATION: 100000 POWDER TOPICAL at 08:11

## 2019-01-31 RX ADMIN — POLYETHYLENE GLYCOL 3350 17 G: 17 POWDER, FOR SOLUTION ORAL at 08:11

## 2019-01-31 RX ADMIN — AMIODARONE HYDROCHLORIDE 1 MG/MIN: 50 INJECTION, SOLUTION INTRAVENOUS at 16:36

## 2019-01-31 RX ADMIN — Medication 20000 ML: at 17:19

## 2019-01-31 RX ADMIN — CALCIUM GLUCONATE 1 G: 98 INJECTION, SOLUTION INTRAVENOUS at 07:12

## 2019-01-31 RX ADMIN — DIBASIC SODIUM PHOSPHATE, MONOBASIC POTASSIUM PHOSPHATE AND MONOBASIC SODIUM PHOSPHATE 2 TABLET: 852; 155; 130 TABLET ORAL at 07:34

## 2019-01-31 RX ADMIN — NYSTATIN: 100000 POWDER TOPICAL at 18:04

## 2019-01-31 RX ADMIN — AMIODARONE HYDROCHLORIDE 150 MG: 50 INJECTION, SOLUTION INTRAVENOUS at 09:38

## 2019-01-31 RX ADMIN — METOPROLOL TARTRATE 2.5 MG: 5 INJECTION, SOLUTION INTRAVENOUS at 11:19

## 2019-01-31 RX ADMIN — DEXMEDETOMIDINE 0.5 MCG/KG/HR: 100 INJECTION, SOLUTION, CONCENTRATE INTRAVENOUS at 03:25

## 2019-01-31 RX ADMIN — CEFAZOLIN SODIUM 2000 MG: 2 SOLUTION INTRAVENOUS at 07:15

## 2019-01-31 RX ADMIN — Medication 20000 ML: at 10:38

## 2019-01-31 RX ADMIN — DEXMEDETOMIDINE 0.7 MCG/KG/HR: 100 INJECTION, SOLUTION, CONCENTRATE INTRAVENOUS at 19:15

## 2019-01-31 RX ADMIN — DEXMEDETOMIDINE 0.7 MCG/KG/HR: 100 INJECTION, SOLUTION, CONCENTRATE INTRAVENOUS at 16:37

## 2019-01-31 RX ADMIN — FENTANYL CITRATE 50 MCG: 50 INJECTION, SOLUTION INTRAMUSCULAR; INTRAVENOUS at 03:44

## 2019-01-31 RX ADMIN — HEPARIN SODIUM AND DEXTROSE 23.1 UNITS/KG/HR: 10000; 5 INJECTION INTRAVENOUS at 08:35

## 2019-01-31 RX ADMIN — CALCIUM GLUCONATE 1 G: 98 INJECTION, SOLUTION INTRAVENOUS at 01:00

## 2019-01-31 RX ADMIN — DEXMEDETOMIDINE 0.7 MCG/KG/HR: 100 INJECTION, SOLUTION, CONCENTRATE INTRAVENOUS at 13:45

## 2019-01-31 RX ADMIN — Medication 20000 ML: at 05:13

## 2019-01-31 RX ADMIN — GABAPENTIN 100 MG: 100 CAPSULE ORAL at 21:17

## 2019-01-31 RX ADMIN — DEXMEDETOMIDINE 0.7 MCG/KG/HR: 100 INJECTION, SOLUTION, CONCENTRATE INTRAVENOUS at 20:36

## 2019-01-31 RX ADMIN — HYDROCORTISONE SODIUM SUCCINATE 50 MG: 100 INJECTION, POWDER, FOR SOLUTION INTRAMUSCULAR; INTRAVENOUS at 08:14

## 2019-01-31 RX ADMIN — DEXMEDETOMIDINE 0.7 MCG/KG/HR: 100 INJECTION, SOLUTION, CONCENTRATE INTRAVENOUS at 06:06

## 2019-01-31 RX ADMIN — CALCIUM GLUCONATE 1 G: 98 INJECTION, SOLUTION INTRAVENOUS at 19:10

## 2019-01-31 RX ADMIN — DEXMEDETOMIDINE 0.7 MCG/KG/HR: 100 INJECTION, SOLUTION, CONCENTRATE INTRAVENOUS at 11:07

## 2019-01-31 RX ADMIN — DEXMEDETOMIDINE 0.7 MCG/KG/HR: 100 INJECTION, SOLUTION, CONCENTRATE INTRAVENOUS at 23:14

## 2019-01-31 RX ADMIN — FENTANYL CITRATE 50 MCG/HR: 50 INJECTION, SOLUTION INTRAMUSCULAR; INTRAVENOUS at 11:07

## 2019-01-31 RX ADMIN — Medication 20000 ML: at 10:56

## 2019-01-31 RX ADMIN — HEPARIN SODIUM AND DEXTROSE 23.1 UNITS/KG/HR: 10000; 5 INJECTION INTRAVENOUS at 21:00

## 2019-01-31 RX ADMIN — SENNOSIDES A AND B 8.8 MG: 415.36 LIQUID ORAL at 21:17

## 2019-01-31 RX ADMIN — FENTANYL CITRATE 50 MCG: 50 INJECTION, SOLUTION INTRAMUSCULAR; INTRAVENOUS at 16:20

## 2019-01-31 RX ADMIN — MAGNESIUM SULFATE HEPTAHYDRATE 1 G: 1 INJECTION, SOLUTION INTRAVENOUS at 19:15

## 2019-01-31 RX ADMIN — FENTANYL CITRATE 50 MCG: 50 INJECTION, SOLUTION INTRAMUSCULAR; INTRAVENOUS at 08:44

## 2019-01-31 RX ADMIN — POLYVINYL ALCOHOL 1 DROP: 14 SOLUTION/ DROPS OPHTHALMIC at 16:49

## 2019-01-31 RX ADMIN — DEXMEDETOMIDINE 0.7 MCG/KG/HR: 100 INJECTION, SOLUTION, CONCENTRATE INTRAVENOUS at 08:34

## 2019-01-31 RX ADMIN — CHLORHEXIDINE GLUCONATE 0.12% ORAL RINSE 15 ML: 1.2 LIQUID ORAL at 20:04

## 2019-01-31 RX ADMIN — OXYCODONE HYDROCHLORIDE 5 MG: 5 SOLUTION ORAL at 12:53

## 2019-01-31 NOTE — PROGRESS NOTES
Cardiology Progress Note - Yue Martínez 60 y o  male MRN: [id number]    [facility] [id number]      Assessment:  Principal Problem:    Cardiogenic shock ([facility] )  Active Problems:    Increased BMI    NSTEMI (non-ST elevated myocardial infarction) ([facility] )    PATRICK (acute kidney injury) ([place] [address] )    Stress-induced cardiomyopathy    Acute respiratory failure with hypoxia ([place] [address] )    History of aortic valve replacement with bioprosthetic valve    Atrial fibrillation (HCC)    Septic shock (HCC)    Staphylococcus aureus bacteremia    Transaminitis    Thrombocytopenia (HCC)    Anemia    Leukocytosis    Plan for today  - on IV Amiodarone 1mg/hr- given another 150mg bolus and drip increased to 1mg/hr- continue this  - Add low dose IV Metoprolol 2 5mg q8h prn  - on anticoagulation with IV Heparin  - recommend weaning off the steroids      Plan:  1  HFrEF- LVEF-35%, LVIDd-6 5 cm- Cardiomyopathy in the setting of Septic shock with possible component of cardiogenic shock- Biventricular failure  - Clinically warm  - Stable off Milrinone  - I/O-3489/5199 with net negative 1710  - Echo 1/25- Upper normal LV size with LVEF-30%, Concentric hypertrophy, Dilated RV with reduced function, Dyssynergic septum  Bioprosthetic aortic valve- not very well visualized- mean gradient of 13  - His cardiomyopathy is predominantly sepsis mediated myocardial dysfunction     2  Septic shock- with gram positive bacteremia- MSSA- unclear source  - On Cefazolin and stress dose steroids- being weaned down  - CT chest 1/23- basilar consolidative lung changes not impressive to explain the degree of his septic shock and he is not high requirements on the vent itself  - CHON with no evidence of endocarditis  - Plan for eventual spine imaging given persistent bacteremia  He does have coin shaped excoriations on the upper back- wife reports scratching his upper back- possible seeding from      3   Bioprosthetic AVR for severe degenerative AS- 25 mm Michael Finley Magna in July 2014  - Gradients across the valve in the past have been around 21, current gradients are 13- CHON- no endocarditis     4  Troponin elevation  - Troponins elevated to above 40, this is in the setting of septic shock  - EKG with no ischemic changes  - Normal cath in 2014  5  New onset Atrial fibrillation in the setting of sepsis  - on IV Amiodarone 1mg/hr- given another 150mg bolus and drip increased to 1mg/hr- continue this  - Add low dose IV Metoprolol 2 5mg q8h prn  - on anticoagulation with IV Heparin  - recommend weaning off the steroids       6  PATRICK- in the setting of septic shock  - on CRRT- transition to HD tomorrow     7  Transaminitis          Subjective:   Patient seen and examined  On CRRT   Off pressors, failed SBT  Afib with RVR this morning which prompted another 150mg IV bolus and drip increased to 1mg/hr    Follows commands off sedation    Telemetry- Atrial fibrillation with ventricular rates 110-130 with intermittently upto 150    Objective:     Vitals: Blood pressure (!) 87/49, pulse 104, temperature 98 9 °F (37 2 °C), temperature source Oral, resp  rate (!) 25, height 5' 9" (1 753 m), weight (!) 167 kg (368 lb 13 3 oz), SpO2 96 %  , Body mass index is 54 47 kg/m² ,   Orthostatic Blood Pressures      Most Recent Value   Blood Pressure   87/49 filed at 01/28/2019 0941   Patient Position - Orthostatic VS  Lying filed at 01/28/2019 0941            Intake/Output Summary (Last 24 hours) at 01/31/19 1141  Last data filed at 01/31/19 1100   Gross per 24 hour   Intake           3839 9 ml   Output             4317 ml   Net           -477 1 ml         Physical Exam:    GEN: Greta Nolan intubated and sedated, follows commands  HEENT: anicteric, mucous membranes moist  NECK: no jvd, no carotid bruits, right and left central lines  HEART: Irregular rhythm, normal S1 and S2, tachycardic, no murmurs, clicks, gallops or rubs   LUNGS: clear to auscultation bilaterally in the anterior lung fields   ABDOMEN: normal bowel sounds, soft, no tenderness, no distention  EXTREMITIES: peripheral pulses 1+; bluish discoloration of the toes, warm feet  NEURO: no focal findings   SKIN: coin shaped excoriations of the upper back      Current Facility-Administered Medications:     acetaminophen (TYLENOL) oral suspension 650 mg, 650 mg, Oral, Q6H PRN, Forrest Grimaldo MD, 650 mg at 19 1946    albuterol inhalation solution 2 5 mg, 2 5 mg, Nebulization, Q4H PRN, Brett Kaur PA-C    [COMPLETED] amiodarone 150 mg in dextrose 5 % 100 mL IV bolus, 150 mg, Intravenous, Once, Stopped at 19 1519 **FOLLOWED BY** [] amiodarone (CORDARONE) 900 mg in dextrose 5 % 500 mL infusion, 1 mg/min, Intravenous, Continuous, Stopped at 19 2129 **FOLLOWED BY** amiodarone (CORDARONE) 900 mg in dextrose 5 % 500 mL infusion, 1 mg/min, Intravenous, Continuous, Yamila Olea MD, Last Rate: 33 3 mL/hr at 19 0744, 1 mg/min at 19 0744    bisacodyl (DULCOLAX) rectal suppository 10 mg, 10 mg, Rectal, Daily PRN, Brett Kaur PA-C    ceFAZolin (ANCEF) IVPB (premix) 2,000 mg, 2,000 mg, Intravenous, Q12H, Zachery Mejia PA-C, Stopped at 19 6947    chlorhexidine (PERIDEX) 0 12 % oral rinse 15 mL, 15 mL, Swish & Spit, Q12H [address], Brett Kaur PA-C, 15 mL at 19 8255    dexmedetomidine (PRECEDEX) 200 mcg in sodium chloride 0 9 % 50 mL infusion, 0 1-0 7 mcg/kg/hr, Intravenous, Titrated, Forrest Grimaldo MD, Last Rate: 25 4 mL/hr at 19 1107, 0 7 mcg/kg/hr at 19 1107    fentaNYL 1250 mcg in sodium chloride 0 9% 125mL drip, 50 mcg/hr, Intravenous, Titrated, Olive Umanzor PA-C, Last Rate: 5 mL/hr at 19 1107, 50 mcg/hr at 19 1107    fentanyl citrate (PF) 100 MCG/2ML 50 mcg, 50 mcg, Intravenous, Q1H PRN, Forrest Grimaldo MD, 50 mcg at 19 0843    gabapentin (NEURONTIN) capsule 100 mg, 100 mg, Oral, HS, Zachery Mejia PA-C, 100 mg at 19 2140    heparin (porcine) 25,000 units in 250 mL infusion (premix), 3-30 Units/kg/hr (Order-Specific), Intravenous, Titrated, Damien Smyth PA-C, Last Rate: 20 8 mL/hr at 01/31/19 0855, 23 1 Units/kg/hr at 01/31/19 0855    hydrocortisone sodium succinate (PF) (Solu-CORTEF) injection 50 mg, 50 mg, Intravenous, Q12H [facility], 50 mg at 01/31/19 0814    insulin lispro (HumaLOG) 100 units/mL subcutaneous injection 5-25 Units, 5-25 Units, Subcutaneous, Q6H [facility], 5 Units at 01/30/19 2355 **AND** Fingerstick Glucose (POCT), , , Q6H, HREBERT Estes    metoprolol (LOPRESSOR) injection 2 5 mg, 2 5 mg, Intravenous, Q6H, Michael Kendrick MD, 2 5 mg at 01/31/19 1119    NxStage K 4/Ca 3 dialysis solution (RFP-401) 20,000 mL, 20,000 mL, Dialysis, Continuous, Marilia K Magda, DO, 20,000 mL at 01/31/19 1056    nystatin (MYCOSTATIN) powder, , Topical, BID, HERBERT Estes, 1 application at 27/11/52 6101    oxyCODONE (ROXICODONE) oral solution 5 mg, 5 mg, Oral, Q4H PRN, Michael Kendrick MD    polyethylene glycol (MIRALAX) packet 17 g, 17 g, Oral, Daily, KATHY Mathias, 17 g at 01/31/19 2413    potassium-sodium phosphateS (K-PHOS,PHOSPHA 250) -250 mg tablet 2 tablet, 2 tablet, Oral, BID With Meals, Michael Kendrick MD, 2 tablet at 01/31/19 1110    senna 8 8 mg/5 mL oral syrup 8 8 mg, 8 8 mg, Oral, HS, HERBERT Estes, 8 8 mg at 01/30/19 2130    Labs & Results:    Lab Results   Component Value Date    CKTOTAL 3,180 (H) 01/27/2019    CKTOTAL 10,421 (H) 01/26/2019    CKTOTAL 38,028 (H) 01/25/2019    CKMB 14 9 (H) 01/27/2019    CKMB 38 7 (H) 01/26/2019    CKMB 139 8 (H) 01/25/2019    CKMBINDEX <1 0 01/27/2019    CKMBINDEX <1 0 01/26/2019    CKMBINDEX <1 0 01/25/2019    TROPONINI 36 20 (H) 01/24/2019    TROPONINI 36 30 (H) 01/24/2019    TROPONINI 34 90 (H) 01/24/2019       Lab Results   Component Value Date    GLUCOSE 92 04/09/2015    CALCIUM 8 5 01/31/2019     04/09/2015    K 4 5 01/31/2019    CO2 21 01/31/2019     01/31/2019    BUN 31 (H) 01/31/2019    CREATININE 2 10 (H) 01/31/2019       Lab Results   Component Value Date    WBC 21 04 (H) 01/31/2019    HGB 10 5 (L) 01/31/2019    HCT 34 1 (L) 01/31/2019    MCV 85 01/31/2019     01/31/2019       Results from last 7 days  Lab Units 01/29/19  1057   INR  1 07       Lab Results   Component Value Date    CHOL 146 07/18/2014    CHOL 145 02/21/2014     Lab Results   Component Value Date    HDL 41 06/02/2018    HDL 52 06/27/2017     Lab Results   Component Value Date    LDLCALC 57 06/02/2018    LDLCALC 129 (H) 06/27/2017     Lab Results   Component Value Date    TRIG 102 06/02/2018    TRIG 71 06/27/2017       Lab Results   Component Value Date    ALT 59 01/27/2019    ALT 64 01/27/2019     (H) 01/27/2019     (H) 01/27/2019

## 2019-01-31 NOTE — PROGRESS NOTES
Progress Note - Nephrology   Wilberto Suggs 60 y o  male MRN: [id number]  [facility]: [id number]      Assessment / Plan:  1  anuric PATRICK, baseline serum creatinine 0 7 -0 8  -PATRICK most likely secondary to ischemic/septic ATN, rhabdomyolysis  -due to progressive worsening renal failure, concern for mild volume overload, worsening acidosis, patient was started on CVVHD on 1/24/19  Skye Goo was changed to CVVH pre filter    -Patient seen and examined on CVVH by me today   -no further clotting issues now that patient is on systemic heparin gtt  -continue CVVH 4 potassium/3 calcium, Q sub 2 9 L/hr, even to -25ml/hr UF as tolerated, Qb 330ml/min  -Avoid nephrotoxins or NSAIDs  -patient anuric  Monitor for signs of renal recovery  -plan for transition to IHD tomorrow, 2/1     2  Septic shock/component of cardiogenic shock, MSSA bacteremia  -CHON did not show evidence of valvular vegetations    -antibiotic as per ICU team   Now off pressors  BP improved on midodrine  Off pressors      3  Metabolic acidosis/lactic acidosis - resolving on CRRT  -likely secondary to worsened renal failure in the setting of septic shock      4  Mild volume overload  -UF removal as above as blood pressure tolerates  Did have tachycardia today  May need pressor added back on if BP remains low  -CHF with EF 30%   Close cardiology follow-up  -urine output remains poor       5  History of aortic stenosis, status post bioprosthetic AVR in 2014     6  VDRF s/p intubation      7  Hyponatremia, mild - likely dilutional, correct on CRRT     8  Anemia of critical illness - Hgb stable in 10s, continue to monitor  Platelets normal range    Subjective:   Patient seen and examined by me at approximately 9:25 a m  On CRRT  UF goal even as patient's BP time tachycardic this morning with heart rate in the 150s  SBP 80s-90s  Still anuric        Objective:     Vitals: Blood pressure (!) 87/49, pulse (!) 124, temperature 98 3 °F (36 8 °C), temperature source Oral, resp  rate (!) 26, height 5' 9" (1 753 m), weight (!) 167 kg (368 lb 13 3 oz), SpO2 100 %  ,Body mass index is 54 47 kg/m²  Temp (24hrs), Av 5 °F (36 9 °C), Min:98 1 °F (36 7 °C), Max:98 9 °F (37 2 °C)      Weight (last 2 days)     Date/Time   Weight    19 0600  (!)  167 (368 83)    19 0600  (!)  168 (371 48)                Intake/Output Summary (Last 24 hours) at 19 1038  Last data filed at 19 1020   Gross per 24 hour   Intake           3849 9 ml   Output             4608 ml   Net           -758 1 ml     I/O last 24 hours: In: 4199 9 [I V :1710 9; NG/GT:30; IV Piggyback:1231; Feedings:1228]  Out: 5465 [Other:5465]        Physical Exam:   Physical Exam   Constitutional: He appears well-developed and well-nourished  No distress  obese   HENT:   Head: Normocephalic and atraumatic  Mouth/Throat: No oropharyngeal exudate  +ETT   Eyes: Right eye exhibits no discharge  Left eye exhibits no discharge  No scleral icterus  Neck: Neck supple  Cardiovascular: Regular rhythm and normal heart sounds  tachycardic   Pulmonary/Chest: Effort normal and breath sounds normal  He has no wheezes  He has no rales  Abdominal: Soft  Bowel sounds are normal  He exhibits no distension  There is no tenderness  Musculoskeletal: He exhibits edema  Neurological:   Intubated/sedated   Skin: Skin is warm and dry  No rash noted  He is not diaphoretic  Psychiatric:   Unable to assess as patient Intubated/sedated   Vitals reviewed        Invasive Devices     Central Venous Catheter Line            CVC Central Lines 19 Triple Left Internal jugular 6 days          Peripheral Intravenous Line            Peripheral IV 19 Left;Upper Arm 3 days    Peripheral IV 19 Right Antecubital less than 1 day          Arterial Line            Arterial Line 19 Right Radial 6 days          Line            Temporary HD Catheter 6 days          Drain            NG/OG/Enteral Tube Orogastric 14 Fr Right mouth 7 days          Airway            ETT  Cuffed 7 5 mm 7 days                Medications:    Scheduled Meds:  Current Facility-Administered Medications:  acetaminophen 650 mg Oral Q6H PRN Temo Uribe MD    albuterol 2 5 mg Nebulization Q4H PRN HERBERT Estes    amiodarone 1 mg/min Intravenous Continuous Lilia Masters MD Last Rate: 1 mg/min (01/31/19 0744)   bisacodyl 10 mg Rectal Daily PRN HERBERT Estes    cefazolin 2,000 mg Intravenous Q12H Lavinia Rosa PA-C Last Rate: Stopped (01/31/19 9131)   chlorhexidine 15 mL Swish & Spit Q12H [address] HERBERT Estes    dexmedetomidine 0 1-0 7 mcg/kg/hr Intravenous Titrated Temo Uribe MD Last Rate: 0 7 mcg/kg/hr (01/31/19 0834)   fentaNYL 50 mcg/hr Intravenous Titrated Gi Hartley PA-C Last Rate: 50 mcg/hr (01/31/19 0744)   fentanyl citrate (PF) 50 mcg Intravenous Q1H PRN Temo Uribe MD    gabapentin 100 mg Oral HS Darby Mejia PA-C    heparin (porcine) 3-30 Units/kg/hr (Order-Specific) Intravenous Titrated Darby Mejia PA-C Last Rate: 23 1 Units/kg/hr (01/31/19 0855)   hydrocortisone sodium succinate 50 mg Intravenous Q12H [address] Donny Mejia PA-C    insulin lispro 5-25 Units Subcutaneous Q6H [address] HERBERT Estes    metoprolol 5 mg Intravenous Q6H Ángela HERBERT Ybarra    NxStage K 4/Ca 3 20,000 mL Dialysis Continuous Marilia Jansen DO Last Rate: Stopped (01/31/19 1002)   nystatin  Topical BID HERBERT Estes    oxyCODONE 5 mg Oral Q4H PRN Lilia Masters MD    polyethylene glycol 17 g Oral Daily KATHY Gage    potassium-sodium phosphateS 2 tablet Oral BID With Meals Lilia Masters MD    senna 8 8 mg Oral HS HERBERT Estes        PRN Meds:   acetaminophen    albuterol    bisacodyl    fentanyl citrate (PF)    oxyCODONE    Continuous Infusions:  amiodarone 1 mg/min Last Rate: 1 mg/min (01/31/19 0744)   dexmedetomidine 0 1-0 7 mcg/kg/hr Last Rate: 0 7 mcg/kg/hr (01/31/19 1187)   fentaNYL 50 mcg/hr Last Rate: 50 mcg/hr (01/31/19 0744)   heparin (porcine) 3-30 Units/kg/hr (Order-Specific) Last Rate: 23 1 Units/kg/hr (01/31/19 0855)   NxStage K 4/Ca 3 20,000 mL Last Rate: Stopped (01/31/19 1002)           LAB RESULTS:        Results from last 7 days  Lab Units 01/31/19  0545 01/30/19  2345 01/30/19  1741 01/30/19  1114 01/30/19  0607 01/29/19  2335 01/29/19  1749  01/29/19  0425  01/28/19  0500  01/27/19  0512  01/26/19  0601 01/26/19  0515  01/25/19  0410 01/24/19  1613   WBC Thousand/uL 21 04*  --   --   --  26 10*  --   --   --  20 70*  --  16 14*  --  15 47*  --   --  13 48*  --  11 12* 12 29*   HEMOGLOBIN g/dL 10 5*  --   --   --  11 1*  --   --   --  10 2*  --  10 7*  --  11 1*  --   --  11 8*  --  13 5 13 8   HEMATOCRIT % 34 1*  --   --   --  35 6*  --   --   --  32 5*  --  33 2*  --  35 1*  --   --  36 8  --  42 5 44 2   PLATELETS Thousands/uL 149  --   --   --  131*  --   --   --  87*  --  57*  --  46*  --   --  46*  --  57* 64*   NEUTROS PCT %  --   --   --   --   --   --   --   --   --   --   --   --  82*  --   --  86*  --  86*  --    LYMPHS PCT %  --   --   --   --   --   --   --   --   --   --   --   --  4*  --   --  3*  --  3*  --    LYMPHO PCT % 10*  --   --   --  4*  --   --   --  1*  --  1*  --   --   --   --   --   --   --   --    MONOS PCT %  --   --   --   --   --   --   --   --   --   --   --   --  9  --   --  10  --  9  --    MONO PCT % 7  --   --   --  1*  --   --   --  5  --  4  --   --   --   --   --   --   --   --    EOS PCT % 0  --   --   --  0  --   --   --  0  --  0  --  0  --   --  0  --  0  --    POTASSIUM mmol/L 4 5 4 3 4 0 4 2 4 4 4 1 3 7  < >  --   < >  --   < > 4 1  < > 4 2  --   < > 3 7 3 9   CHLORIDE mmol/L 106 106 104 105 106 106 106  < >  --   < >  --   < > 107  < > 107  --   < > 104 107   CO2 mmol/L 21 25 25 24 24 24 24  < >  --   < >  --   < > 22  < > 21  --   < > 20* 17*   BUN mg/dL 31* 30* 30* 31* 32* 30* 32*  < >  --   < >  --   < > 33*  < > 36*  --   < > 45* 52*   CREATININE mg/dL 2 10* 2 13* 2 15* 2 18* 2 23* 2 23* 2 24*  < >  --   < >  --   < > 2 54*  < > 2 89*  --   < > 3 64* 4 03*   CALCIUM mg/dL 8 5 8 1* 8 5 8 3 8 4 8 2* 8 1*  < >  --   < >  --   < > 8 3  < > 8 2*  --   < > 7 5* 6 6*   ALK PHOS U/L  --   --   --   --   --   --   --   --   --   --   --   --  90  88  --  79  --   --  80 73   ALT U/L  --   --   --   --   --   --   --   --   --   --   --   --  59  64  --  159*  --   --  323* 344*   AST U/L  --   --   --   --   --   --   --   --   --   --   --   --  277*  279*  --  551*  --   --  1,114* 1,513*   MAGNESIUM mg/dL 2 2 1 9 2 0 2 1 2 2 1 9 2 1  < >  --   < >  --   < >  --   < > 2 1  --   < > 2 0 2 2   PHOSPHORUS mg/dL 2 8 2 9 2 8 2 8 2 8 2 6* 2 5*  < >  --   < >  --   < >  --   < > 2 4*  --   < > 3 1 4 6*   < > = values in this interval not displayed  CUTURES:  Lab Results   Component Value Date    BLOODCX No Growth at 72 hrs  01/27/2019    BLOODCX No Growth at 72 hrs  01/27/2019    BLOODCX Staphylococcus aureus (A) 01/26/2019    BLOODCX Staphylococcus aureus (A) 01/26/2019    BLOODCX Staphylococcus aureus (A) 01/24/2019    BLOODCX Staphylococcus aureus (A) 01/24/2019    BLOODCX Staphylococcus aureus (A) 01/23/2019    URINECX 1581-4795 cfu/ml Gram Positive Cocci (A) 01/23/2019                 Portions of the record may have been created with voice recognition software  Occasional wrong word or "sound a like" substitutions may have occurred due to the inherent limitations of voice recognition software  Read the chart carefully and recognize, using context, where substitutions have occurred  If you have any questions, please contact the dictating provider

## 2019-01-31 NOTE — PROGRESS NOTES
Progress Note - Critical Care   Naseem Lombardi 60 y o  male MRN: [id number]  Unit/Bed#: [facility] 11 Encounter: [id number]    Attending Physician: Alanna Pham, DO      ______________________________________________________________________  Assessment and Plan:   Principal Problem:    Cardiogenic shock ([facility] [place] [address] )  Active Problems:    Increased BMI    NSTEMI (non-ST elevated myocardial infarction) ([facility] [place] [address] )    [facility]ICK [facility]acute [facility]) [facility]    Stress-induced cardiomyopathy    Acute respiratory failure with hypoxia [facility] )    History of aortic valve replacement with bioprosthetic valve    Atrial fibrillation ([facility] [place] [address] )    Septic shock (HCC)    Staphylococcus aureus bacteremia    Transaminitis    Thrombocytopenia (HCC)    Anemia    Leukocytosis  Resolved Problems:    Acute encephalopathy    Rhabdomyolysis    68-year-old admitted with shock of multifactorial etiology with PATRICK and newly diagnosed AFib with RVR     Neuro:   Toxic metabolic encephalopathy  - patient is somnolent taoday    -Sedation & Analgesia: Precedex and Fentanyl   Will try weaning off fentanyl and continue sedation with precedex  Neuro checks as per unit protocol  Take precautions to prevent ICU delirium   Monitor GCS        CV:   Shock, likely multifactorial, pressor requirements improved  - Off pressors  - Hydrocortisone decreased to 50 mg q 12 with MAP  > 65   -Continue to monitor with MAP goal >65      New onset A fib with RVR   - Amiodarone drip   - A fib with HR is the mid 90's to 140's   - On Heparin drip for [facility]  - per Cardiology, consider a repeat TTE when clinically improved, consider switching to Oral Amiodarone today  - continue tele monitoring     NSTEMI type 2 2/2 shock  - Continue to hold ASA, statin, BB 2/2 shock and thrombocytopenia   Continuous telemetry      Pulm:   Acute hypoxic respiratory failure  - Intubated on vent settings 16/500/40%/5  - SPO2 goal > 92%   -will try to wean off ventilator as tolerated  -monitor secretions        GI:   Shock liver vs hepatic congestion  · GI ppx: not indicated, pt on TF  · Bowel regimen: Dulcolax, senna     :   PATRICK likely 2/2 ischemia vs Septic ATN vs Rhabdo  · CVVH running even  · UOP -anuric   · Monitor I&Os, net -1 6L over past 24H and -1 3L since admission  · Cr stable at 2  1(BL 0 7-0 8), GFR 33  · CK improving will repeat in 2 days  · Nephrology following         F/E/N:  1  Fluids/Electrolytes  · Will monitor  Electrolytes and Replete as needed       2   Nutrition: TF at goal of 52        ID:   Unknown source of infection: MSSA bacteremia , likely from wounds on the back  WBC  increased likely in the setting of steroid use vs infection  Bcx 1/27 negative at 48 hr  CHON negative for vegetation, EF  30%  Day # 7 of Ancef, renally dosed ,will treat for at least 6 weeks  Trend fever curve and WBC   Per ID, will consider spine imaging         Heme:   Thrombocytopenia likely consumptive, improving  Hbg- 11 1, will continue to trend CBC  tranfuse as needed with goal >7  DVT ppx: On heparin drip for AC, PTT at goal at 89  Continue SCDs     Endo:   Blood sugars well controlled, 106-156 over the last 24 hrs  Goal of 140-180  Will continue to monitor  SSI     · Msk/Skin:   · Turn/position 2 hourly  · Wound care   · PT/OT when appropriate            Disposition: Continued ICU stay  Code Status: Level 1 - Full Code    Counseling / Coordination of Care  Total Critical Care time spent 30 minutes excluding procedures, teaching and family updates  ______________________________________________________________________    Chief Complaint: Intubated    24 Hour Events:   Pt noted to be tachy in the 140's overnight   CVVH decreased to -25 cc/hr    ______________________________________________________________________    Physical Exam:     Physical Exam   HENT:   intubated   Eyes: Conjunctivae are normal    Cardiovascular:   Irregularly irregular   Pulmonary/Chest:   Decreased BS b/l   Abdominal: Bowel sounds are normal  There is no tenderness  Tense on palpation upper adbomen   Neurological:   Somnolent, follows commands   Skin:   Scabbed wounds left arm, lateral R leg  Vitals reviewed  ______________________________________________________________________  Vitals:    19 0500 19 0530 19 0600 19 0610   BP:       BP Location:       Pulse: (!) 108 (!) 114 (!) 140 (!) 118   Resp: (!) 24 (!) 23 (!) 34 22   Temp:       TempSrc:       SpO2: 97% 97% 91% 99%   Weight:   (!) 167 kg (368 lb 13 3 oz)    Height:           Temperature:   Temp (24hrs), Av 5 °F (36 9 °C), Min:98 1 °F (36 7 °C), Max:98 9 °F (37 2 °C)    Current Temperature: 98 7 °F (37 1 °C)  Weights:   IBW: 70 7 kg    Body mass index is 54 47 kg/m²    Weight (last 2 days)     Date/Time   Weight    19 06  (!)  167 (368 83)    19 06  (!)  168 (371 48)            Hemodynamic Monitoring:  N/A     Non-Invasive/Invasive Ventilation Settings:  Respiratory    Lab Data (Last 4 hours)    None         O2/Vent Data (Last 4 hours)       0344           Vent Mode AC/VC       Resp Rate (BPM) (BPM) 16       Vt (mL) (mL) 500       FIO2 (%) (%) 40       PEEP (cmH2O) (cmH2O) 5       MV 12 5       FIO2 (%) (%) 40       PEEP (cmH2O) (cmH2O) 5       Pressure Support (cmH2O) (cmH20) 8                 No results found for: PHART, IOS8TEM, PO2ART, JCH7FEV, B8XWUHRY, BEART, SOURCE    Intake and Outputs:  I/O       701 -  0700 701 -  0700    I V  (mL/kg) 1402 5 (8 3) 1414 9 (8 5)    NG/GT 0 30    IV Piggyback 1339 898    Feedings 1128 1028    Total Intake(mL/kg) 3869 5 (23) 3370 9 (20 2)    Emesis/NG output 0     Other 5255 5041    Total Output 5255 5041    Net -1385 5 -1670 1          Unmeasured Stool Occurrence 1 x 2 x          Nutrition:        Diet Orders            Start     Ordered    19 0848  Diet Enteral/Parenteral; Tube Feeding No Oral Diet; Nepro; Continuous; 52  Diet effective now     Question Answer Comment   Diet Type Enteral/Parenteral    Enteral/Parenteral Tube Feeding No Oral Diet    Tube Feeding Formula: Nepro    Bolus/Cyclic/Continuous Continuous    Tube Feeding Goal Rate (mL/hr): 52    RD to adjust diet per protocol? Yes        01/26/19 0848          Labs:     Results from last 7 days  Lab Units 01/31/19  0545 01/30/19  0607 01/29/19  0425 01/28/19  0500 01/27/19  0512 01/26/19  0515 01/25/19  0410   WBC Thousand/uL 21 04* 26 10* 20 70* 16 14* 15 47* 13 48* 11 12*   HEMOGLOBIN g/dL 10 5* 11 1* 10 2* 10 7* 11 1* 11 8* 13 5   HEMATOCRIT % 34 1* 35 6* 32 5* 33 2* 35 1* 36 8 42 5   PLATELETS Thousands/uL 149 131* 87* 57* 46* 46* 57*   NEUTROS PCT %  --   --   --   --  82* 86* 86*   MONOS PCT %  --   --   --   --  9 10 9   MONO PCT %  --  1* 5 4  --   --   --        Results from last 7 days  Lab Units 01/31/19  0545 01/30/19  2345 01/30/19  1741  01/27/19  0512  01/26/19  0601  01/25/19  0410   POTASSIUM mmol/L 4 5 4 3 4 0  < > 4 1  < > 4 2  < > 3 7   CHLORIDE mmol/L 106 106 104  < > 107  < > 107  < > 104   CO2 mmol/L 21 25 25  < > 22  < > 21  < > 20*   BUN mg/dL 31* 30* 30*  < > 33*  < > 36*  < > 45*   CREATININE mg/dL 2 10* 2 13* 2 15*  < > 2 54*  < > 2 89*  < > 3 64*   CALCIUM mg/dL 8 5 8 1* 8 5  < > 8 3  < > 8 2*  < > 7 5*   ALK PHOS U/L  --   --   --   --  90  88  --  79  --  80   ALT U/L  --   --   --   --  59  64  --  159*  --  323*   AST U/L  --   --   --   --  277*  279*  --  551*  --  1,114*   < > = values in this interval not displayed      Results from last 7 days  Lab Units 01/31/19  0545 01/30/19  2345 01/30/19  1741   MAGNESIUM mg/dL 2 2 1 9 2 0     Lab Results   Component Value Date    PHOS 2 8 01/31/2019    PHOS 2 9 01/30/2019    PHOS 2 8 01/30/2019        Results from last 7 days  Lab Units 01/31/19  0205 01/30/19  2010 01/30/19  1315  01/29/19  1057  01/27/19  0555  01/25/19  0410   INR   --   --   --   --  1 07  --  0 95  --  0 94   PTT seconds 89* 65* 58*  < > 33  < > 28  < >  --    < > = values in this interval not displayed  0  Lab Value Date/Time   TROPONINI 36 20 (H) 01/24/2019 2137   TROPONINI 36 30 (H) 01/24/2019 1831   TROPONINI 34 90 (H) 01/24/2019 1613   TROPONINI >40 00 (HH) 01/24/2019 0502   TROPONINI >40 00 (HH) 01/24/2019 0207   TROPONINI 36 61 (HH) 01/23/2019 2251   TROPONINI 24 29 (HH) 01/23/2019 1850   TROPONINI 14 20 (HH) 01/23/2019 1600       Results from last 7 days  Lab Units 01/24/19  1613   LACTIC ACID mmol/L 1 3     ABG:  Lab Results   Component Value Date    PHART 7 477 (H) 01/26/2019    CWP6RXD 29 6 (LL) 01/26/2019    PO2ART 71 7 (L) 01/26/2019    ALQ7YKA 21 4 (L) 01/26/2019    BEART -1 1 01/26/2019    SOURCE Line, Arterial 01/26/2019     Imaging: No new imaging    Micro:  Lab Results   Component Value Date    BLOODCX No Growth at 72 hrs  01/27/2019    BLOODCX No Growth at 72 hrs  01/27/2019    BLOODCX Staphylococcus aureus (A) 01/26/2019    BLOODCX Staphylococcus aureus (A) 01/26/2019    URINECX 4619-2812 cfu/ml Gram Positive Cocci (A) 01/23/2019    SPUTUMCULTUR 1+ Growth of Staphylococcus aureus (A) 01/24/2019     Allergies:    Allergies   Allergen Reactions    Penicillins Rash     However, has subsequently tolerated Cefazolin and Cefepime     Medications:   Scheduled Meds:  Current Facility-Administered Medications:  acetaminophen 650 mg Oral Q6H PRN Cristina Smith MD    albuterol 2 5 mg Nebulization Q4H PRN HERBERT Estes    amiodarone 0 5 mg/min Intravenous Continuous Robyn Dunne PA-C Last Rate: 0 5 mg/min (01/30/19 1846)   bisacodyl 10 mg Rectal Daily PRN HERBERT Estes    calcium gluconate 1 G  100 mL IVPB 1 g Intravenous Once Liana Win MD    cefazolin 2,000 mg Intravenous Q12H Aimee Mejia PA-C Last Rate: Stopped (01/30/19 2018)   chlorhexidine 15 mL Swish & Spit Q12H [address] HERBERT Estes    dexmedetomidine 0 1-0 7 mcg/kg/hr Intravenous Titrated Cristina Smith MD Last Rate: 0 7 mcg/kg/hr (01/31/19 0606)   fentaNYL 50 mcg/hr Intravenous Titrated Temo García PA-C Last Rate: 50 mcg/hr (01/30/19 1330)   fentanyl citrate (PF) 50 mcg Intravenous Q1H PRN Blane Wood MD    gabapentin 100 mg Oral HS Bruna Mejia PA-C    heparin (porcine) 3-30 Units/kg/hr (Order-Specific) Intravenous Titrated Bruna Hunt PA-C Last Rate: 23 1 Units/kg/hr (01/30/19 1925)   hydrocortisone sodium succinate 50 mg Intravenous Q12H [address] Donny Mejia PA-C    insulin lispro 5-25 Units Subcutaneous Q6H [address] [place], [place]    NxStage K 4/Ca 3 20,000 mL Dialysis Continuous Mila Larger, DO Last Rate: 0 mL (01/31/19 0512)   nystatin  Topical BID Jill Acharya PA-C    oxyCODONE 5 mg Oral Q4H PRN Chantal Delgado MD    polyethylene glycol 17 g Oral Daily KATHY Segura    potassium-sodium phosphateS 2 tablet Oral BID With Meals Chantal Delgado MD    senna 8 8 mg Oral HS Jill Acharya PA-C      Continuous Infusions:  amiodarone 0 5 mg/min Last Rate: 0 5 mg/min (01/30/19 1846)   dexmedetomidine 0 1-0 7 mcg/kg/hr Last Rate: 0 7 mcg/kg/hr (01/31/19 0606)   fentaNYL 50 mcg/hr Last Rate: 50 mcg/hr (01/30/19 1330)   heparin (porcine) 3-30 Units/kg/hr (Order-Specific) Last Rate: 23 1 Units/kg/hr (01/30/19 1925)   NxStage K 4/Ca 3 20,000 mL Last Rate: 0 mL (01/31/19 0512)     PRN Meds:    acetaminophen 650 mg Q6H PRN   albuterol 2 5 mg Q4H PRN   bisacodyl 10 mg Daily PRN   fentanyl citrate (PF) 50 mcg Q1H PRN   oxyCODONE 5 mg Q4H PRN     VTE Pharmacologic Prophylaxis: Heparin Drip  VTE Mechanical Prophylaxis: sequential compression device  Invasive lines and devices:   Invasive Devices     Central Venous Catheter Line            CVC Central Lines 01/24/19 Triple Left Internal jugular 6 days          Peripheral Intravenous Line            Peripheral IV 01/27/19 Left;Upper Arm 3 days    Peripheral IV 01/31/19 Right Antecubital less than 1 day          Arterial Line            Arterial Line 01/24/19 Right Radial 6 days          Line            Temporary HD Catheter 6 days          Drain            NG/OG/Enteral Tube Orogastric 14 Fr Right mouth 7 days          Airway            ETT  Cuffed 7 5 mm 7 days                     Portions of the record may have been created with voice recognition software  Occasional wrong word or "sound a like" substitutions may have occurred due to the inherent limitations of voice recognition software  Read the chart carefully and recognize, using context, where substitutions have occurred      Michael Kendrick MD

## 2019-01-31 NOTE — PROGRESS NOTES
Progress Note - Infectious Disease   Wilberto Suggs 60 y o  male MRN: [id number]  Unit/Bed#: MICU 11 Encounter: [id number]      Impression/Recommendations:  1  Severe sepsis/septic shock   Fever, tachycardia, leukocytosis, hypotension   Also with component of cardiogenic shock  Likely precipitated by MSSA bacteremia   No other clear evidence of acute infection identified   Fevers, procalcitonin  much improved  Leukocytosis remains persistent but otherwise patient clinically much improved with clearance of bacteremia  Remains off of vasopressors  No fevers      -antibiotic plan as below  -monitor temperatures and hemodynamics  -check CBC in a m   -supportive care     2  MSSA bacteremia   Strong possibility of endocarditis in the setting of bioprosthetic AVR   CHON with no evidence of valvular vegetations   Initial portal of entry may have been related to multiple upper back wounds, which appear scabbed over with no overt evidence of acute infection on exam   CT chest/abdomen/pelvis with no other evidence of acute infection  Possible developing pneumonia as sputum culture was positive for MSSA   Bacteremia has been prolonged but fortunately, most recent blood cultures remain negative     -continue with renally dosed IV cefazolin as patient continues to clinically improve  -may require further imaging including spinal imaging, however, no focal neurologic deficits on exam   -will require prolonged course of IV antibiotic of likely 6 weeks in the setting of prolonged bacteremia and bioprosthetic AVR      3  History of bioprosthetic AVR   Secondary to degenerative AS   Placed in July 2014  Melanie Roach no evidence of endocarditis      4  Acute kidney injury   Likely ischemic/septic ATN   Currently on CRRT which is running negative   Nephrology following closely  Landon Huerta continue with dose adjusted antibiotic with CRRT      5   Acute hypoxic respiratory failure   Likely in the setting of septic shock as well as acute systolic cardiomyopathy/volume overload   Recent CT chest with no evidence to suggest pulmonary infection   Most recent chest x-ray continues to suggest significant volume overload      -wean off ventilator as able  -monitor secretions  -monitor O2 requirements  -continue with volume removal     6  Elevated CPK   Unclear etiology   Patient with no reported recent fall or traumatic injury   CPK level much improved      7  Abnormal LFTs   Likely secondary to shock state   LFTs much improved          Antibiotics:  Cefazolin     Discussed above plan with Dr Jovita Espino from [address] [place] [address]  Subjective:  Patient remains awake on ventilator  Remains off of vasopressors  Remains on CRRT  No fevers  WBC count down to 21 today  Objective:  Vitals:  Temp:  [98 1 °F (36 7 °C)-98 9 °F (37 2 °C)] 98 9 °F (37 2 °C)  HR:  [] 142  Resp:  [18-37] 33  SpO2:  [91 %-100 %] 94 %  Temp (24hrs), Av 5 °F (36 9 °C), Min:98 1 °F (36 7 °C), Max:98 9 °F (37 2 °C)  Current: Temperature: 98 9 °F (37 2 °C)    Physical Exam:   General:  Remains awake on ventilator  Eyes:  Conjunctive clear with no hemorrhages or effusions  Oropharynx:  ET tube in place  Neck:  Supple, no lymphadenopathy  Lungs:  Ventilated breath sounds  Cardiac:  Regular rate and rhythm, no murmurs  Abdomen:  Soft, non-tender, non-distented  Extremities:  Stable edema  Skin:  No rashes, no ulcers  Neurological:  Awake on ventilator, moves all extremities    Lab Results:  I have personally reviewed pertinent labs      Results from last 7 days  Lab Units 19  1136 19  0545 19  2345  19  0512  19  0601  19  0410   POTASSIUM mmol/L 4 5 4 5 4 3  < > 4 1  < > 4 2  < > 3 7   CHLORIDE mmol/L 105 106 106  < > 107  < > 107  < > 104   CO2 mmol/L 25 21 25  < > 22  < > 21  < > 20*   BUN mg/dL 33* 31* 30*  < > 33*  < > 36*  < > 45*   CREATININE mg/dL 2 25* 2 10* 2 13*  < > 2 54*  < > 2 89*  < > 3 64*   EGFR ml/min/1 73sq m 31 33 33  < > 27  < > 23 < > 17   CALCIUM mg/dL 8 2* 8 5 8 1*  < > 8 3  < > 8 2*  < > 7 5*   AST U/L  --   --   --   --  277*  279*  --  551*  --  1,114*   ALT U/L  --   --   --   --  59  64  --  159*  --  323*   ALK PHOS U/L  --   --   --   --  90  88  --  79  --  80   < > = values in this interval not displayed  Results from last 7 days  Lab Units 01/31/19  0545 01/30/19  0607 01/29/19  0425   WBC Thousand/uL 21 04* 26 10* 20 70*   HEMOGLOBIN g/dL 10 5* 11 1* 10 2*   PLATELETS Thousands/uL 149 131* 87*       Results from last 7 days  Lab Units 01/27/19  1055 01/27/19  1039 01/26/19  0502   BLOOD CULTURE  No Growth After 4 Days  No Growth After 4 Days  Staphylococcus aureus*  Staphylococcus aureus*   GRAM STAIN RESULT   --   --  Gram positive cocci in clusters  Gram positive cocci in clusters       Imaging Studies:   I have personally reviewed pertinent imaging study reports and images in PACS  Chest x-ray shows slight progression of congestive changes    EKG, Pathology, and Other Studies:   I have personally reviewed pertinent reports

## 2019-01-31 NOTE — PHYSICAL THERAPY NOTE
Physical Therapy Cancellation Note    PT CONSULT RECEIVED  PATIENT IS INTUBATED/SEDATED AND NOT MEDICALLY APPROPRIATE FOR PARTICIPATION IN SKILLED PT  PT WILL CONTINUE TO FOLLOW ON CASELOAD AND PERFORM INITIAL EVALUATION AS MEDICALLY APPROPRIATE      Paul Villagran, PT

## 2019-02-01 ENCOUNTER — APPOINTMENT (INPATIENT)
Dept: DIALYSIS | Facility: HOSPITAL | Age: 59
DRG: 871 | End: 2019-02-01
Payer: COMMERCIAL

## 2019-02-01 LAB
ALBUMIN SERPL BCP-MCNC: 2.2 G/DL (ref 3.5–5)
ALP SERPL-CCNC: 108 U/L (ref 46–116)
ALT SERPL W P-5'-P-CCNC: 19 U/L (ref 12–78)
ANION GAP SERPL CALCULATED.3IONS-SCNC: 5 MMOL/L (ref 4–13)
ANION GAP SERPL CALCULATED.3IONS-SCNC: 7 MMOL/L (ref 4–13)
ANION GAP SERPL CALCULATED.3IONS-SCNC: 8 MMOL/L (ref 4–13)
ANISOCYTOSIS BLD QL SMEAR: PRESENT
APTT PPP: 77 SECONDS (ref 26–38)
APTT PPP: 88 SECONDS (ref 26–38)
APTT PPP: 92 SECONDS (ref 26–38)
AST SERPL W P-5'-P-CCNC: 80 U/L (ref 5–45)
BACTERIA BLD CULT: NORMAL
BACTERIA BLD CULT: NORMAL
BASOPHILS # BLD MANUAL: 0 THOUSAND/UL (ref 0–0.1)
BASOPHILS NFR MAR MANUAL: 0 % (ref 0–1)
BILIRUB DIRECT SERPL-MCNC: 0.19 MG/DL (ref 0–0.2)
BILIRUB SERPL-MCNC: 0.45 MG/DL (ref 0.2–1)
BUN SERPL-MCNC: 32 MG/DL (ref 5–25)
BUN SERPL-MCNC: 35 MG/DL (ref 5–25)
BUN SERPL-MCNC: 36 MG/DL (ref 5–25)
CA-I BLD-SCNC: 1.08 MMOL/L (ref 1.12–1.32)
CA-I BLD-SCNC: 1.16 MMOL/L (ref 1.12–1.32)
CA-I BLD-SCNC: 1.16 MMOL/L (ref 1.12–1.32)
CALCIUM SERPL-MCNC: 8.1 MG/DL (ref 8.3–10.1)
CALCIUM SERPL-MCNC: 8.3 MG/DL (ref 8.3–10.1)
CALCIUM SERPL-MCNC: 8.4 MG/DL (ref 8.3–10.1)
CHLORIDE SERPL-SCNC: 104 MMOL/L (ref 100–108)
CHLORIDE SERPL-SCNC: 105 MMOL/L (ref 100–108)
CHLORIDE SERPL-SCNC: 105 MMOL/L (ref 100–108)
CK MB SERPL-MCNC: 3.9 NG/ML (ref 0–5)
CK MB SERPL-MCNC: <1 % (ref 0–2.5)
CK SERPL-CCNC: 463 U/L (ref 39–308)
CO2 SERPL-SCNC: 23 MMOL/L (ref 21–32)
CO2 SERPL-SCNC: 24 MMOL/L (ref 21–32)
CO2 SERPL-SCNC: 25 MMOL/L (ref 21–32)
CREAT SERPL-MCNC: 2.13 MG/DL (ref 0.6–1.3)
CREAT SERPL-MCNC: 2.13 MG/DL (ref 0.6–1.3)
CREAT SERPL-MCNC: 2.26 MG/DL (ref 0.6–1.3)
EOSINOPHIL # BLD MANUAL: 0 THOUSAND/UL (ref 0–0.4)
EOSINOPHIL NFR BLD MANUAL: 0 % (ref 0–6)
ERYTHROCYTE [DISTWIDTH] IN BLOOD BY AUTOMATED COUNT: 18.1 % (ref 11.6–15.1)
GFR SERPL CREATININE-BSD FRML MDRD: 31 ML/MIN/1.73SQ M
GFR SERPL CREATININE-BSD FRML MDRD: 33 ML/MIN/1.73SQ M
GFR SERPL CREATININE-BSD FRML MDRD: 33 ML/MIN/1.73SQ M
GLUCOSE SERPL-MCNC: 113 MG/DL (ref 65–140)
GLUCOSE SERPL-MCNC: 121 MG/DL (ref 65–140)
GLUCOSE SERPL-MCNC: 126 MG/DL (ref 65–140)
GLUCOSE SERPL-MCNC: 126 MG/DL (ref 65–140)
GLUCOSE SERPL-MCNC: 127 MG/DL (ref 65–140)
GLUCOSE SERPL-MCNC: 128 MG/DL (ref 65–140)
HBV CORE AB SER QL: NORMAL
HBV CORE IGM SER QL: NORMAL
HBV SURFACE AB SER-ACNC: <3.1 MIU/ML
HBV SURFACE AG SER QL: NORMAL
HCT VFR BLD AUTO: 32.8 % (ref 36.5–49.3)
HCV AB SER QL: NORMAL
HGB BLD-MCNC: 10.1 G/DL (ref 12–17)
LYMPHOCYTES # BLD AUTO: 0.21 THOUSAND/UL (ref 0.6–4.47)
LYMPHOCYTES # BLD AUTO: 1 % (ref 14–44)
MAGNESIUM SERPL-MCNC: 2 MG/DL (ref 1.6–2.6)
MAGNESIUM SERPL-MCNC: 2 MG/DL (ref 1.6–2.6)
MAGNESIUM SERPL-MCNC: 2.1 MG/DL (ref 1.6–2.6)
MCH RBC QN AUTO: 26.6 PG (ref 26.8–34.3)
MCHC RBC AUTO-ENTMCNC: 30.8 G/DL (ref 31.4–37.4)
MCV RBC AUTO: 86 FL (ref 82–98)
METAMYELOCYTES NFR BLD MANUAL: 4 % (ref 0–1)
MONOCYTES # BLD AUTO: 0.63 THOUSAND/UL (ref 0–1.22)
MONOCYTES NFR BLD: 3 % (ref 4–12)
MYELOCYTES NFR BLD MANUAL: 6 % (ref 0–1)
NEUTROPHILS # BLD MANUAL: 18.04 THOUSAND/UL (ref 1.85–7.62)
NEUTS SEG NFR BLD AUTO: 86 % (ref 43–75)
NRBC BLD AUTO-RTO: 0 /100 WBCS
PHOSPHATE SERPL-MCNC: 3 MG/DL (ref 2.7–4.5)
PHOSPHATE SERPL-MCNC: 3.2 MG/DL (ref 2.7–4.5)
PHOSPHATE SERPL-MCNC: 3.4 MG/DL (ref 2.7–4.5)
PLATELET # BLD AUTO: 140 THOUSANDS/UL (ref 149–390)
PLATELET BLD QL SMEAR: ABNORMAL
PMV BLD AUTO: 12.6 FL (ref 8.9–12.7)
POIKILOCYTOSIS BLD QL SMEAR: PRESENT
POLYCHROMASIA BLD QL SMEAR: PRESENT
POTASSIUM SERPL-SCNC: 4.5 MMOL/L (ref 3.5–5.3)
POTASSIUM SERPL-SCNC: 4.6 MMOL/L (ref 3.5–5.3)
POTASSIUM SERPL-SCNC: 4.7 MMOL/L (ref 3.5–5.3)
PROT SERPL-MCNC: 5.9 G/DL (ref 6.4–8.2)
RBC # BLD AUTO: 3.8 MILLION/UL (ref 3.88–5.62)
RBC MORPH BLD: PRESENT
SODIUM SERPL-SCNC: 134 MMOL/L (ref 136–145)
SODIUM SERPL-SCNC: 135 MMOL/L (ref 136–145)
SODIUM SERPL-SCNC: 137 MMOL/L (ref 136–145)
WBC # BLD AUTO: 20.98 THOUSAND/UL (ref 4.31–10.16)

## 2019-02-01 PROCEDURE — 82948 REAGENT STRIP/BLOOD GLUCOSE: CPT

## 2019-02-01 PROCEDURE — 80048 BASIC METABOLIC PNL TOTAL CA: CPT | Performed by: INTERNAL MEDICINE

## 2019-02-01 PROCEDURE — 85007 BL SMEAR W/DIFF WBC COUNT: CPT | Performed by: PHYSICIAN ASSISTANT

## 2019-02-01 PROCEDURE — 84100 ASSAY OF PHOSPHORUS: CPT | Performed by: INTERNAL MEDICINE

## 2019-02-01 PROCEDURE — 90945 DIALYSIS ONE EVALUATION: CPT

## 2019-02-01 PROCEDURE — 0HBRXZZ EXCISION OF TOE NAIL, EXTERNAL APPROACH: ICD-10-PCS | Performed by: STUDENT IN AN ORGANIZED HEALTH CARE EDUCATION/TRAINING PROGRAM

## 2019-02-01 PROCEDURE — 82550 ASSAY OF CK (CPK): CPT | Performed by: PHYSICIAN ASSISTANT

## 2019-02-01 PROCEDURE — 90945 DIALYSIS ONE EVALUATION: CPT | Performed by: INTERNAL MEDICINE

## 2019-02-01 PROCEDURE — 99233 SBSQ HOSP IP/OBS HIGH 50: CPT | Performed by: INTERNAL MEDICINE

## 2019-02-01 PROCEDURE — 94003 VENT MGMT INPAT SUBQ DAY: CPT

## 2019-02-01 PROCEDURE — 83735 ASSAY OF MAGNESIUM: CPT | Performed by: INTERNAL MEDICINE

## 2019-02-01 PROCEDURE — 99291 CRITICAL CARE FIRST HOUR: CPT | Performed by: INTERNAL MEDICINE

## 2019-02-01 PROCEDURE — 82553 CREATINE MB FRACTION: CPT | Performed by: PHYSICIAN ASSISTANT

## 2019-02-01 PROCEDURE — 85730 THROMBOPLASTIN TIME PARTIAL: CPT | Performed by: INTERNAL MEDICINE

## 2019-02-01 PROCEDURE — 80076 HEPATIC FUNCTION PANEL: CPT | Performed by: PHYSICIAN ASSISTANT

## 2019-02-01 PROCEDURE — 94760 N-INVAS EAR/PLS OXIMETRY 1: CPT

## 2019-02-01 PROCEDURE — 82330 ASSAY OF CALCIUM: CPT | Performed by: INTERNAL MEDICINE

## 2019-02-01 PROCEDURE — 85027 COMPLETE CBC AUTOMATED: CPT | Performed by: PHYSICIAN ASSISTANT

## 2019-02-01 PROCEDURE — 93005 ELECTROCARDIOGRAM TRACING: CPT

## 2019-02-01 PROCEDURE — 99232 SBSQ HOSP IP/OBS MODERATE 35: CPT | Performed by: INTERNAL MEDICINE

## 2019-02-01 RX ORDER — HYDROMORPHONE HCL/PF 1 MG/ML
1 SYRINGE (ML) INJECTION
Status: DISCONTINUED | OUTPATIENT
Start: 2019-02-01 | End: 2019-02-21

## 2019-02-01 RX ORDER — ALBUMIN (HUMAN) 12.5 G/50ML
25 SOLUTION INTRAVENOUS EVERY 6 HOURS
Status: COMPLETED | OUTPATIENT
Start: 2019-02-01 | End: 2019-02-02

## 2019-02-01 RX ORDER — MIDODRINE HYDROCHLORIDE 5 MG/1
10 TABLET ORAL
Status: DISCONTINUED | OUTPATIENT
Start: 2019-02-01 | End: 2019-02-03

## 2019-02-01 RX ORDER — METOPROLOL TARTRATE 5 MG/5ML
2.5 INJECTION INTRAVENOUS EVERY 6 HOURS PRN
Status: DISCONTINUED | OUTPATIENT
Start: 2019-02-01 | End: 2019-02-04

## 2019-02-01 RX ORDER — OXYCODONE HCL 5 MG/5 ML
10 SOLUTION, ORAL ORAL EVERY 4 HOURS PRN
Status: DISCONTINUED | OUTPATIENT
Start: 2019-02-01 | End: 2019-02-07

## 2019-02-01 RX ADMIN — DEXMEDETOMIDINE 0.7 MCG/KG/HR: 100 INJECTION, SOLUTION, CONCENTRATE INTRAVENOUS at 16:42

## 2019-02-01 RX ADMIN — CALCIUM GLUCONATE 2 G: 98 INJECTION, SOLUTION INTRAVENOUS at 01:10

## 2019-02-01 RX ADMIN — DEXMEDETOMIDINE 0.7 MCG/KG/HR: 100 INJECTION, SOLUTION, CONCENTRATE INTRAVENOUS at 19:21

## 2019-02-01 RX ADMIN — DEXMEDETOMIDINE 0.7 MCG/KG/HR: 100 INJECTION, SOLUTION, CONCENTRATE INTRAVENOUS at 08:35

## 2019-02-01 RX ADMIN — HYDROMORPHONE HYDROCHLORIDE 1 MG: 1 INJECTION, SOLUTION INTRAMUSCULAR; INTRAVENOUS; SUBCUTANEOUS at 17:00

## 2019-02-01 RX ADMIN — HEPARIN SODIUM AND DEXTROSE 21.1 UNITS/KG/HR: 10000; 5 INJECTION INTRAVENOUS at 22:00

## 2019-02-01 RX ADMIN — GABAPENTIN 100 MG: 100 CAPSULE ORAL at 22:00

## 2019-02-01 RX ADMIN — DEXMEDETOMIDINE 0.7 MCG/KG/HR: 100 INJECTION, SOLUTION, CONCENTRATE INTRAVENOUS at 13:41

## 2019-02-01 RX ADMIN — Medication 20000 ML: at 07:11

## 2019-02-01 RX ADMIN — AMIODARONE HYDROCHLORIDE 1 MG/MIN: 50 INJECTION, SOLUTION INTRAVENOUS at 08:34

## 2019-02-01 RX ADMIN — NYSTATIN: 100000 POWDER TOPICAL at 08:19

## 2019-02-01 RX ADMIN — CALCIUM GLUCONATE 1 G: 98 INJECTION, SOLUTION INTRAVENOUS at 13:38

## 2019-02-01 RX ADMIN — DEXMEDETOMIDINE 0.7 MCG/KG/HR: 100 INJECTION, SOLUTION, CONCENTRATE INTRAVENOUS at 11:32

## 2019-02-01 RX ADMIN — DEXMEDETOMIDINE 0.7 MCG/KG/HR: 100 INJECTION, SOLUTION, CONCENTRATE INTRAVENOUS at 01:10

## 2019-02-01 RX ADMIN — FENTANYL CITRATE 50 MCG: 50 INJECTION, SOLUTION INTRAMUSCULAR; INTRAVENOUS at 05:05

## 2019-02-01 RX ADMIN — FENTANYL CITRATE 50 MCG/HR: 50 INJECTION, SOLUTION INTRAMUSCULAR; INTRAVENOUS at 13:57

## 2019-02-01 RX ADMIN — ALBUMIN (HUMAN) 25 G: 0.25 INJECTION, SOLUTION INTRAVENOUS at 17:51

## 2019-02-01 RX ADMIN — MIDODRINE HYDROCHLORIDE 10 MG: 5 TABLET ORAL at 17:51

## 2019-02-01 RX ADMIN — SENNOSIDES A AND B 8.8 MG: 415.36 LIQUID ORAL at 22:00

## 2019-02-01 RX ADMIN — CEFAZOLIN SODIUM 2000 MG: 2 SOLUTION INTRAVENOUS at 08:11

## 2019-02-01 RX ADMIN — DEXMEDETOMIDINE 0.7 MCG/KG/HR: 100 INJECTION, SOLUTION, CONCENTRATE INTRAVENOUS at 22:01

## 2019-02-01 RX ADMIN — HEPARIN SODIUM AND DEXTROSE 21.1 UNITS/KG/HR: 10000; 5 INJECTION INTRAVENOUS at 08:50

## 2019-02-01 RX ADMIN — ALBUMIN (HUMAN) 25 G: 0.25 INJECTION, SOLUTION INTRAVENOUS at 23:52

## 2019-02-01 RX ADMIN — CHLORHEXIDINE GLUCONATE 0.12% ORAL RINSE 15 ML: 1.2 LIQUID ORAL at 08:19

## 2019-02-01 RX ADMIN — NYSTATIN: 100000 POWDER TOPICAL at 22:01

## 2019-02-01 RX ADMIN — Medication 20000 ML: at 00:12

## 2019-02-01 RX ADMIN — METOPROLOL TARTRATE 2.5 MG: 1 INJECTION, SOLUTION INTRAVENOUS at 17:54

## 2019-02-01 RX ADMIN — CHLORHEXIDINE GLUCONATE 0.12% ORAL RINSE 15 ML: 1.2 LIQUID ORAL at 22:01

## 2019-02-01 RX ADMIN — DEXMEDETOMIDINE 0.7 MCG/KG/HR: 100 INJECTION, SOLUTION, CONCENTRATE INTRAVENOUS at 03:55

## 2019-02-01 RX ADMIN — CALCIUM GLUCONATE 1 G: 98 INJECTION, SOLUTION INTRAVENOUS at 08:11

## 2019-02-01 RX ADMIN — DEXMEDETOMIDINE 0.7 MCG/KG/HR: 100 INJECTION, SOLUTION, CONCENTRATE INTRAVENOUS at 06:19

## 2019-02-01 NOTE — PROGRESS NOTES
Progress Note - Nephrology   Georges Recio 60 y o  male MRN: [id number]  [facility]: [id number]      Assessment / Plan:  1  anuric PATRICK, baseline serum creatinine 0 7 -0 8  -PATRICK most likely secondary to ischemic/septic ATN, rhabdomyolysis  -due to progressive worsening renal failure, concern for mild volume overload, worsening acidosis, patient was started on CRRT on 19      -Patient seen and examined on CVVH by me today   -will d/c CRRT and transition to IHD for volume management today in setting of anuric renal failure and volume overload  -Avoid nephrotoxins or NSAIDs  -patient anuric  Monitor for signs of renal recovery   -assess daily for IHD needs  -transition temporary HD catheter to permacath by IR when able     2  Septic shock/component of cardiogenic shock, MSSA bacteremia  -CHON did not show evidence of valvular vegetations    -antibiotic as per ICU team   Now off pressors  BP improved on midodrine       3  Metabolic acidosis/lactic acidosis - resolving on CRRT  -likely secondary to worsened renal failure in the setting of septic shock      4  Mild volume overload  -UF removal as tolerated with HD today  -CHF with EF 30%  [facility] cardiology follow-up  -still anuric     5  History of aortic stenosis, status post bioprosthetic AVR in      6  VDRF s/p intubation      7  Hyponatremia, mild - resolved     8  Anemia of critical illness - Hgb stable in 10s, continue to monitor  Subjective:   Patient seen and examined by me on CRRT at approximately 9:30 a m  Rafaela Shah UF goal -25 mL/hr  Patient is on amiodarone drip  No significant urine output  On 40% FiO2  BP improved  Off pressors  Objective:     Vitals: Blood pressure (!) 87/49, pulse 88, temperature 98 2 °F (36 8 °C), temperature source Oral, resp  rate (!) 24, height 5' 9" (1 753 m), weight (!) 167 kg (368 lb 6 2 oz), SpO2 97 %  ,Body mass index is 54 4 kg/m²  Temp (24hrs), Av 9 °F (37 2 °C), Min:98 2 °F (36 8 °C), Max:99 5 °F (37 5 °C)      Weight (last 2 days)     Date/Time   Weight    02/01/19 0500  (!)  167 (368 39)    01/31/19 0600  (!)  167 (368 83)    01/30/19 0600  (!)  168 (371 48)                Intake/Output Summary (Last 24 hours) at 02/01/19 1323  Last data filed at 02/01/19 1200   Gross per 24 hour   Intake           3811 8 ml   Output             3971 ml   Net           -159 2 ml     I/O last 24 hours: In: 4869 8 [I V :2430 8; NG/GT:30; IV Piggyback:1033; Feedings:1376]  Out: 6731 [Other:4530]        Physical Exam:   Physical Exam   Constitutional: He appears well-developed and well-nourished  No distress  obese   HENT:   Head: Normocephalic and atraumatic  Mouth/Throat: No oropharyngeal exudate  +ETT   Eyes: Right eye exhibits no discharge  Left eye exhibits no discharge  No scleral icterus  Neck: Neck supple  Cardiovascular: Regular rhythm and normal heart sounds  tachycardic   Pulmonary/Chest: Effort normal  He has no wheezes  He has no rales  Coarse BS b/l   Abdominal: Soft  Bowel sounds are normal  He exhibits no distension  There is no tenderness  Musculoskeletal: He exhibits edema  Neurological:   intubated/sedated   Skin: Skin is warm and dry  No rash noted  He is not diaphoretic  Psychiatric:   Unable to assess as patient intubated/sedated   Vitals reviewed        Invasive Devices     Central Venous Catheter Line            CVC Central Lines 01/24/19 Triple Left Internal jugular 8 days          Peripheral Intravenous Line            Peripheral IV 01/31/19 Right Antecubital 1 day          Arterial Line            Arterial Line 01/24/19 Right Radial 7 days          Line            Temporary HD Catheter 7 days          Drain            NG/OG/Enteral Tube Orogastric 14 Fr Right mouth 8 days          Airway            ETT  Cuffed 7 5 mm 8 days                Medications:    Scheduled Meds:  Current Facility-Administered Medications:  acetaminophen 650 mg Oral Q6H PRN Forrest Grimaldo MD    albuterol 2 5 mg Nebulization Q4H PRN Saba ePterson PA-C    amiodarone 1 mg/min Intravenous Continuous Merleen Jeans, MD Last Rate: 1 mg/min (02/01/19 0834)   bisacodyl 10 mg Rectal Daily PRN Saba Peterson PA-C    calcium gluconate 1 G  100 mL IVPB 1 g Intravenous Once Manus MD Audelia    [START ON 2/2/2019] cefazolin 1,000 mg Intravenous Q24H Merleen Jeans, MD    chlorhexidine 15 mL Swish & Spit Q12H [address] Saba Peterson PA-C    dexmedetomidine 0 1-0 7 mcg/kg/hr Intravenous Titrated Cristina Smith MD Last Rate: 0 7 mcg/kg/hr (02/01/19 1132)   fentaNYL 50 mcg/hr Intravenous Titrated Robyn Dunne PA-C Last Rate: 50 mcg/hr (02/01/19 0811)   gabapentin 100 mg Oral HS Rociojerri Etienne PA-C    heparin (porcine) 3-30 Units/kg/hr (Order-Specific) Intravenous Titrated Rocio Lowell, PA-C Last Rate: 21 1 Units/kg/hr (02/01/19 0850)   HYDROmorphone 1 mg Intravenous Q3H PRN Lidya Martinez    insulin lispro 5-25 Units Subcutaneous Q6H [address] Saba Peterson PA-C    metoprolol 2 5 mg Intravenous Q6H PRN Merleen Jeans, MD    NxStage K 4/Ca 3 20,000 mL Dialysis Continuous Marilia Letitia Huerta DO Last Rate: 0 mL (02/01/19 0011)   nystatin  Topical BID Saba Peterson PA-C    oxyCODONE 10 mg Oral Q4H PRN Lidya Martinez    polyethylene glycol 17 g Oral Daily KATHY Mathias    polyvinyl alcohol 1 drop Both Eyes Q3H PRN Birtha Skiff, PA-C    potassium-sodium phosphateS 2 tablet Oral BID With Meals Merleen Jeans, MD    senna 8 8 mg Oral HS Saba Peterson PA-C        PRN Meds:   acetaminophen    albuterol    bisacodyl    HYDROmorphone    metoprolol    oxyCODONE    polyvinyl alcohol    Continuous Infusions:  amiodarone 1 mg/min Last Rate: 1 mg/min (02/01/19 0834)   dexmedetomidine 0 1-0 7 mcg/kg/hr Last Rate: 0 7 mcg/kg/hr (02/01/19 1132)   fentaNYL 50 mcg/hr Last Rate: 50 mcg/hr (02/01/19 0811)   heparin (porcine) 3-30 Units/kg/hr (Order-Specific) Last Rate: 21 1 Units/kg/hr (02/01/19 0850) NxStage K 4/Ca 3 20,000 mL Last Rate: 0 mL (02/01/19 0011)           LAB RESULTS:        Results from last 7 days  Lab Units 02/01/19  1139 02/01/19  0605 02/01/19  0005 01/31/19  1802 01/31/19  1136 01/31/19  0545 01/30/19  2345  01/30/19  0607  01/29/19  0425  01/28/19  0500  01/27/19  0512  01/26/19  0601 01/26/19  0515   WBC Thousand/uL  --  20 98*  --   --   --  21 04*  --   --  26 10*  --  20 70*  --  16 14*  --  15 47*  --   --  13 48*   HEMOGLOBIN g/dL  --  10 1*  --   --   --  10 5*  --   --  11 1*  --  10 2*  --  10 7*  --  11 1*  --   --  11 8*   HEMATOCRIT %  --  32 8*  --   --   --  34 1*  --   --  35 6*  --  32 5*  --  33 2*  --  35 1*  --   --  36 8   PLATELETS Thousands/uL  --  140*  --   --   --  149  --   --  131*  --  87*  --  57*  --  46*  --   --  46*   NEUTROS PCT %  --   --   --   --   --   --   --   --   --   --   --   --   --   --  82*  --   --  86*   LYMPHS PCT %  --   --   --   --   --   --   --   --   --   --   --   --   --   --  4*  --   --  3*   LYMPHO PCT %  --  1*  --   --   --  10*  --   --  4*  --  1*  --  1*  --   --   --   --   --    MONOS PCT %  --   --   --   --   --   --   --   --   --   --   --   --   --   --  9  --   --  10   MONO PCT %  --  3*  --   --   --  7  --   --  1*  --  5  --  4  --   --   --   --   --    EOS PCT %  --  0  --   --   --  0  --   --  0  --  0  --  0  --  0  --   --  0   POTASSIUM mmol/L 4 6 4 5 4 7 4 6 4 5 4 5 4 3  < > 4 4  < >  --   < >  --   < > 4 1  < > 4 2  --    CHLORIDE mmol/L 105 104 105 105 105 106 106  < > 106  < >  --   < >  --   < > 107  < > 107  --    CO2 mmol/L 25 23 24 24 25 21 25  < > 24  < >  --   < >  --   < > 22  < > 21  --    BUN mg/dL 36* 35* 32* 33* 33* 31* 30*  < > 32*  < >  --   < >  --   < > 33*  < > 36*  --    CREATININE mg/dL 2 13* 2 13* 2 26* 2 20* 2 25* 2 10* 2 13*  < > 2 23*  < >  --   < >  --   < > 2 54*  < > 2 89*  --    CALCIUM mg/dL 8 3 8 4 8 1* 8 3 8 2* 8 5 8 1*  < > 8 4  < >  --   < >  --   < > 8 3  < > 8 2*  -- ALK PHOS U/L  --  108  --   --   --   --   --   --   --   --   --   --   --   --  90  88  --  79  --    ALT U/L  --  19  --   --   --   --   --   --   --   --   --   --   --   --  59  64  --  159*  --    AST U/L  --  80*  --   --   --   --   --   --   --   --   --   --   --   --  277*  279*  --  551*  --    MAGNESIUM mg/dL 2 0 2 1 2 0 1 9 2 0 2 2 1 9  < > 2 2  < >  --   < >  --   < >  --   < > 2 1  --    PHOSPHORUS mg/dL 3 0 3 2 3 4 3 3 3 1 2 8 2 9  < > 2 8  < >  --   < >  --   < >  --   < > 2 4*  --    < > = values in this interval not displayed  CUTURES:  Lab Results   Component Value Date    BLOODCX No Growth After 4 Days  01/27/2019    BLOODCX No Growth After 4 Days  01/27/2019    BLOODCX Staphylococcus aureus (A) 01/26/2019    BLOODCX Staphylococcus aureus (A) 01/26/2019    BLOODCX Staphylococcus aureus (A) 01/24/2019    BLOODCX Staphylococcus aureus (A) 01/24/2019    BLOODCX Staphylococcus aureus (A) 01/23/2019    URINECX 5124-1170 cfu/ml Gram Positive Cocci (A) 01/23/2019                 Portions of the record may have been created with voice recognition software  Occasional wrong word or "sound a like" substitutions may have occurred due to the inherent limitations of voice recognition software  Read the chart carefully and recognize, using context, where substitutions have occurred  If you have any questions, please contact the dictating provider

## 2019-02-01 NOTE — RESPIRATORY THERAPY NOTE
RT Ventilator Management Note  Simi Osorio 60 y o  male MRN: [id number]  Unit/Bed#: [facility] 11 Encounter: [id number]      Daily Screen       1/30/2019 0822 1/31/2019 0710          Patient safety screen outcome[de-identified] - Failed      Not Ready for Weaning due to[de-identified] - Underline problem not resolved      Spont breathing trial outcome[de-identified] Failed -      Spont breathing trial reason failed: RR > 35 bpm -      Previous settings resumed: Yes -      Name of Medical Team Notified[de-identified] R N  NOTIFIED  -              Physical Exam:   Assessment Type: Assess only  General Appearance: Sedated  Respiratory Pattern: Assisted  Bilateral Breath Sounds: Coarse (slightly)  Suction: ET Tube      Resp Comments: Pt back at baseline prior to recent episode  Airway pressure stable  SpO2 97%  BS slightly coarse

## 2019-02-01 NOTE — CONSULTS
Consult - [address] [place] [address] Veronica 60 y o  male MRN: [id number]  Unit/Bed#: [facility] 11 Encounter: [id number]    Assessment/Plan     Assessment:  1  Onychomycosis  2  DTI to left hallux    Plan:  - Toenails times 10 trimmed with N[employer]pper  No incidents noted  - recommend strict offloading of heels to bed  - spoke with nurse about left hallux; instructed to notify Podiatry if changes are noted at any time during treatment  - thank you for consultation podiatry will sign off at this time  Patient podiatry pager for any questions or concerns, or if DTI to left hallux worsens    History of Present Illness     HPI:  Rachid Shore is a 60 y o  male who presents as consult for elongated toenails  Toenails times 10 are elongated, thickened, dystrophic  Patient is currently intubated thus verbal history is not obtained  Consults  Review of Systems   Constitutional: Negative  HENT: Negative  Eyes: Negative  Respiratory: Negative  Cardiovascular: Negative  Gastrointestinal: Negative  Musculoskeletal:  Negative  Skin:  Toenails times 10 elongated   Neurological: Negative  Psych: negative  Historical Information   Past Medical History:   Diagnosis Date    Allergic rhinitis     last assessed 9/12/12    Finger fracture, right     Closed fx of the middle phalanx of the right 5th finger  last assessed 1/30/14    Hemorrhoids     last assessed 2/10/14    Hyperlipidemia     Hypertension      Past Surgical History:   Procedure Laterality Date    AORTIC VALVE REPLACEMENT  07/30/2014    AVR with 25mm [facility] Ease bovine pericardial valve    CARDIAC CATHETERIZATION  07/18/2014    SLB left main-normal, circumflex-normal, ramus intermedius-normal, RCA was large and dominant giving rise to the PDA & a large posterolateral branch  No disease    last assessed 8/19/14     KNEE CARTILAGE SURGERY Left     medial meniscus tear     TESTICLE SURGERY      TONSILLECTOMY AND ADENOIDECTOMY      TOOTH EXTRACTION       Social History   History   Alcohol Use No     Comment: ocassion /never drank alcohol per [employer]      History   Drug Use No     History   Smoking Status    Never Smoker   Smokeless Tobacco    Never Used     Family History:   Family History   Problem Relation Age of Onset    Lung cancer Mother     Heart disease Mother         pacemaker placement     Coronary artery disease Father     Hypertension Father     Heart attack Father     Cancer Family         bladder        Meds/Allergies   Prescriptions Prior to Admission   Medication    amLODIPine (NORVASC) 5 mg tablet    ascorbic acid (VITAMIN C) 500 MG tablet    aspirin (ECOTRIN) 325 mg EC tablet    fluticasone (FLONASE) 50 mcg/act nasal spray    loratadine (CLARITIN) 10 mg tablet    metoprolol tartrate (LOPRESSOR) 100 mg tablet    potassium chloride (K-DUR,KLOR-CON) 20 mEq tablet    pravastatin (PRAVACHOL) 40 mg tablet    torsemide (DEMADEX) 20 mg tablet     Allergies   Allergen Reactions    Penicillins Rash     However, has subsequently tolerated Cefazolin and Cefepime       Objective   First Vitals:   Blood Pressure: 124/72 (01/24/19 1530)  Pulse: 94 (01/24/19 1530)  Temperature: 99 °F (37 2 °C) (01/24/19 1530)  Temp Source: Rectal (01/24/19 1530)  Respirations: 20 (01/24/19 1530)  Height: 5' 9" (175 3 cm) (01/24/19 1530)  Weight - Scale: (!) 145 kg (319 lb 3 6 oz) (01/24/19 1530)  SpO2: 98 % (01/24/19 1500)    Current Vitals:   Blood Pressure: (!) 87/49 (01/28/19 0941)  Pulse: 94 (02/01/19 1500)  Temperature: 98 2 °F (36 8 °C) (02/01/19 0800)  Temp Source: Oral (02/01/19 0800)  Respirations: (!) 24 (02/01/19 1500)  Height: 5' 9" (175 3 cm) (01/24/19 1530)  Weight - Scale: (!) 167 kg (368 lb 6 2 oz) (02/01/19 0500)  SpO2: 99 % (02/01/19 1453)        BP (!) 87/49 (BP Location: Left arm)   Pulse 94   Temp 98 2 °F (36 8 °C) (Oral)   Resp (!) 24   Ht 5' 9" (1 753 m)   Wt (!) 167 kg (368 lb 6 2 oz)   SpO2 99%   BMI 54 40 kg/m² Abdomen:     Soft, non-tender   Extremities:   Manual muscle testing unable to be assessed  Bilateral lower extremities edematous, toes times 10 and foot mildly erythematous due to reperfusion   Pulses:  Nonpalpable DP and PT bilaterally however dopplerable  Toes are bluish-red however warm  Skin:  Toenails times 10 elongated, thickened, with notable subungual debris  Left hallux with distal deep tissue injury  No open lesions noted  Neurologic:   Gross sensation is intact, protective sensation unable to be assessed  Lab Results:   Admission on 01/24/2019   No results displayed because visit has over 200 results  Results from last 7 days  Lab Units 01/26/19  0502   GRAM STAIN RESULT  Gram positive cocci in clusters  Gram positive cocci in clusters         Results from last 7 days  Lab Units 01/27/19  1055 01/27/19  1039 01/26/19  0502   BLOOD CULTURE  No Growth After 5 Days  No Growth After 5 Days  Staphylococcus aureus*  Staphylococcus aureus*       Invalid input(s): LABAEARO            Imaging: I have personally reviewed pertinent films in PACS  EKG, Pathology, and Other Studies: I have personally reviewed pertinent reports        Code Status: Level 1 - Full Code  Advance Directive and Living Will:      Power of :    POLST:

## 2019-02-01 NOTE — RESPIRATORY THERAPY NOTE
RT Ventilator Management Note  Riley Espinoza 60 y o  male MRN: [id number]  Unit/Bed#: [facility] 11 Encounter: [id number]      Daily Screen       1/30/2019 0822 1/31/2019 0710          Patient safety screen outcome[de-identified] - Failed      Not Ready for Weaning due to[de-identified] - Underline problem not resolved      Spont breathing trial outcome[de-identified] Failed -      Spont breathing trial reason failed: RR > 35 bpm -      Previous settings resumed: Yes -      Name of Medical Team Notified[de-identified] R N  NOTIFIED  -              Physical Exam:   Assessment Type: (P) Assess only  General Appearance: (P) Awake  Respiratory Pattern: (P) Assisted, Normal  Chest Assessment: (P) Chest expansion symmetrical  Bilateral Breath Sounds: (P) Clear  R Breath Sounds: (P) Clear  L Breath Sounds: (P) Clear  Cough: (P) None  Suction: (P) ET Tube  O2 Device: (P) ventilator      Resp Comments: (P) Pt tolerating current vent settings and appears to be resting comfortably  No vent changes at this time  Will continue to monitor and assess per respiratory protocol

## 2019-02-01 NOTE — PROGRESS NOTES
Progress Note - Critical Care   Lilia Pace 60 y o  male MRN: [id number]  [facility]: [id number]    Attending Physician: Elida Colon, DO      ______________________________________________________________________  Assessment and Plan:   Principal Problem:    Cardiogenic shock ([facility] )  Active Problems:    Increased BMI    [facility] (non-ST elevated [facility] infarction) ([facility]    PATRICK (acute kidney injury) ([facility] )    Stress-induced cardiomyopathy    Acute respiratory failure with hypoxia ([facility] )    History of aortic valve replacement with bioprosthetic valve    Atrial fibrillation ([facility] )    Septic shock (HCC)    Staphylococcus aureus bacteremia    Transaminitis    Thrombocytopenia (HCC)    Anemia    Leukocytosis  Resolved Problems:    Acute encephalopathy    Rhabdomyolysis    53-year-old admitted with shock of multifactorial etiology with PATRICK and newly diagnosed AFib with RVR     Neuro:   · Patient is alert, following commands today  · Sedation & Analgesia: Precedex, Fentanyl, Oxycodone  · Neuro checks as per unit protocol  · Take precautions to prevent ICU delirium   Monitor GCS     Peripheral Neuropathy  · Gabapentin 100 daily    Conjunctivitis  · Artificial tears    CV:   Shock, likely multifactorial,off pressors  Hydrocortisone d/c'd  Continue to monitor with MAP goal >65      New onset A fib with RVR   - Amiodarone drip s/p Amiodarone bolus   - A fib with HR is the mid 80's to 130's   - Metoprolol 2 5 mg Q6 prn   - On Heparin drip for [facility]  - per Cardiology, consider a repeat TTE when clinically improved  - continue tele monitoring  - Echo 1/25: EF 40% with moderate diffuse hypokinesis, no evidence of vegetations  - History of Bioprosthetic AoVR     NSTEMI type 2 2/2 shock   Continuous telemetry      Pulm:   Acute hypoxic respiratory failure  - Intubated on vent settings 16/450/40%/5  - SPO2 goal > 92%   -will try to wean off ventilator as tolerated  -monitor secretions        GI:   Shock liver vs hepatic congestion  · GI ppx: not indicated, pt on TF  · Bowel regimen: Miralax, senna     :   PATRICK likely 2/2 ischemia vs Septic ATN vs Rhabdo  · CRRT running even  · UOP -anuric   · Monitor I&Os, net +459 mls over past 24H and -916 mls since admission  · Cr stable at 2  2(BL 0 7-0 8), GFR 31  · CK improved markedly  · Nephrology following and will transition to IHD today        F/E/N:  1  Fluids/Electrolytes  · Will monitor  Electrolytes and Replete as needed       2   Nutrition: TF at goal of 52        ID:   MSSA bacteremia , likely 2/2 MSSA PNA in the setting of positive Sputum Culture  WBC gradually improving  Bcx 1/27 negative at 4 days  CHON negative for vegetation, EF  30%  Day # 8 of Ancef, renally dosed ,will treat for at least 6 weeks, per ID  Trend fever curve and WBC   Per ID, will consider spine imaging         Heme:   Thrombocytopenia likely consumptive, improving  Hbg- stable at 10 1, will continue to trend CBC  tranfuse as needed with goal >7  DVT ppx: On heparin drip for AC, PTT- 92, at therapeutic goal   Continue SCDs     Endo:   Blood sugars well controlled, 113-136 over the last 24 hrs  Goal of 140-180  Will continue to monitor  SSI     · Msk/Skin:   · Turn/position 2 hourly  · Wound care   · PT/OT when appropriate             Disposition: Continued ICU stay    Code Status: Level 1 - Full Code    Counseling / Coordination of Care  Total Critical Care time spent 30 minutes excluding procedures, teaching and family updates  ______________________________________________________________________    Chief Complaint: Intubated    24 Hour Events:   Overnight, pt had an episode of airway obstruction from mucous in ETT  Pt lavaged and bagged several times  BP have been less labile, HR in the 90's      ______________________________________________________________________    Physical Exam:   Physical Exam   HENT:   Head: Normocephalic and atraumatic     Eyes: Conjunctivae are normal    Cardiovascular: Irregularly irregular   Pulmonary/Chest:   Diminished BS bilaterally   Abdominal: Bowel sounds are normal    Neurological: He is alert  Intubated, follows commands   Skin:   Scabbed wound left forearm   Vitals reviewed  ______________________________________________________________________  Vitals:    19 0500 19 0505 19 0530 19 0600   BP:       BP Location:       Pulse: (!) 132 (!) 132 94 86   Resp: (!) 30 (!) 35 (!) 24 22   Temp:       TempSrc:       SpO2: 97% 95% 97% 98%   Weight: (!) 167 kg (368 lb 6 2 oz)      Height:           Temperature:   Temp (24hrs), Av 9 °F (37 2 °C), Min:98 3 °F (36 8 °C), Max:99 5 °F (37 5 °C)    Current Temperature: 98 7 °F (37 1 °C)  Weights:   IBW: 70 7 kg    Body mass index is 54 4 kg/m²    Weight (last 2 days)     Date/Time   Weight    19 0500  (!)  167 (368 39)    19 0600  (!)  167 (368 83)    19 0600  (!)  168 (371 48)            Hemodynamic Monitoring:  N/A     Non-Invasive/Invasive Ventilation Settings:  Respiratory    Lab Data (Last 4 hours)    None         O2/Vent Data (Last 4 hours)       0338           Vent Mode AC/VC       Resp Rate (BPM) (BPM) 16       Vt (mL) (mL) 450       FIO2 (%) (%) 40       PEEP (cmH2O) (cmH2O) 5       MV 13 5       FIO2 (%) (%) 40       PEEP (cmH2O) (cmH2O) 5       Pressure Support (cmH2O) (cmH20) 8                 No results found for: PHART, YYM4UQF, PO2ART, VTB0DLL, Q6BJOBIZ, BEART, SOURCE    Intake and Outputs:  I/O       701 -  0700  07 -  0700    I V  (mL/kg) 1480 9 (8 9) 1884 4 (11 3)    NG/GT 30 30    IV Piggyback 898 886    Feedings 1080 1069    Total Intake(mL/kg) 3488 9 (20 9) 3869 4 (23 2)    Other 5199 3410    Total Output 5199 3410    Net -1710 1 +459 4          Unmeasured Stool Occurrence 2 x 1 x          Nutrition:        Diet Orders            Start     Ordered    19 0848  Diet Enteral/Parenteral; Tube Feeding No Oral Diet; Nepro; Continuous; 52  Diet effective now     Question Answer Comment   Diet Type Enteral/Parenteral    Enteral/Parenteral Tube Feeding No Oral Diet    Tube Feeding Formula: Nepro    Bolus/Cyclic/Continuous Continuous    Tube Feeding Goal Rate (mL/hr): 52    RD to adjust diet per protocol? Yes        01/26/19 0848          Labs:     Results from last 7 days  Lab Units 01/31/19  0545 01/30/19  0607 01/29/19  0425  01/27/19  0512 01/26/19  0515   WBC Thousand/uL 21 04* 26 10* 20 70*  < > 15 47* 13 48*   HEMOGLOBIN g/dL 10 5* 11 1* 10 2*  < > 11 1* 11 8*   HEMATOCRIT % 34 1* 35 6* 32 5*  < > 35 1* 36 8   PLATELETS Thousands/uL 149 131* 87*  < > 46* 46*   NEUTROS PCT %  --   --   --   --  82* 86*   MONOS PCT %  --   --   --   --  9 10   MONO PCT % 7 1* 5  < >  --   --    < > = values in this interval not displayed  Results from last 7 days  Lab Units 02/01/19 0005 01/31/19 1802 01/31/19  1136  01/27/19  0512  01/26/19  0601   POTASSIUM mmol/L 4 7 4 6 4 5  < > 4 1  < > 4 2   CHLORIDE mmol/L 105 105 105  < > 107  < > 107   CO2 mmol/L 24 24 25  < > 22  < > 21   BUN mg/dL 32* 33* 33*  < > 33*  < > 36*   CREATININE mg/dL 2 26* 2 20* 2 25*  < > 2 54*  < > 2 89*   CALCIUM mg/dL 8 1* 8 3 8 2*  < > 8 3  < > 8 2*   ALK PHOS U/L  --   --   --   --  90  88  --  79   ALT U/L  --   --   --   --  59  64  --  159*   AST U/L  --   --   --   --  277*  279*  --  551*   < > = values in this interval not displayed  Results from last 7 days  Lab Units 02/01/19  0005 01/31/19  1802 01/31/19  1136   MAGNESIUM mg/dL 2 0 1 9 2 0     Lab Results   Component Value Date    PHOS 3 4 02/01/2019    PHOS 3 3 01/31/2019    PHOS 3 1 01/31/2019        Results from last 7 days  Lab Units 01/31/19  0808 01/31/19  0205 01/30/19 2010 01/29/19  1057  01/27/19  0555   INR   --   --   --   --  1 07  --  0 95   PTT seconds 82* 89* 65*  < > 33  < > 28   < > = values in this interval not displayed      0  Lab Value Date/Time   TROPONINI 36 20 (H) 01/24/2019 2137   TROPONINI 36 30 (H) 01/24/2019 1831   TROPONINI 34 90 (H) 01/24/2019 1613   TROPONINI >40 00 (HH) 01/24/2019 0502   TROPONINI >40 00 (HH) 01/24/2019 0207   TROPONINI 36 61 (HH) 01/23/2019 2251   TROPONINI 24 29 (HH) 01/23/2019 1850   TROPONINI 14 20 (HH) 01/23/2019 1600         ABG:  Lab Results   Component Value Date    PHART 7 477 (H) 01/26/2019    SJW6TRY 29 6 (LL) 01/26/2019    PO2ART 71 7 (L) 01/26/2019    CNZ7DQT 21 4 (L) 01/26/2019    BEART -1 1 01/26/2019    SOURCE Line, Arterial 01/26/2019     Imaging: No new imaging        Micro:  Lab Results   Component Value Date    BLOODCX No Growth After 4 Days  01/27/2019    BLOODCX No Growth After 4 Days  01/27/2019    BLOODCX Staphylococcus aureus (A) 01/26/2019    BLOODCX Staphylococcus aureus (A) 01/26/2019    URINECX 9437-7414 cfu/ml Gram Positive Cocci (A) 01/23/2019    SPUTUMCULTUR 1+ Growth of Staphylococcus aureus (A) 01/24/2019     Allergies:    Allergies   Allergen Reactions    Penicillins Rash     However, has subsequently tolerated Cefazolin and Cefepime     Medications:   Scheduled Meds:  Current Facility-Administered Medications:  acetaminophen 650 mg Oral Q6H PRN Lisa Milner MD    albuterol 2 5 mg Nebulization Q4H PRN Drew Tang PA-C    amiodarone 1 mg/min Intravenous Continuous Robson Lr MD Last Rate: 1 mg/min (01/31/19 1636)   bisacodyl 10 mg Rectal Daily PRN Drew Tang PA-C    cefazolin 2,000 mg Intravenous Q12H Sarabjit Hunt PA-C Last Rate: Stopped (01/31/19 2110)   chlorhexidine 15 mL Swish & Spit Q12H [facility] Drew Tang PA-C    dexmedetomidine 0 1-0 7 mcg/kg/hr Intravenous Titrated Lisa Milner MD Last Rate: 0 7 mcg/kg/hr (02/01/19 0494)   fentaNYL 50 mcg/hr Intravenous Titrated Lonnie Reese PA-C Last Rate: 50 mcg/hr (01/31/19 1107)   fentanyl citrate (PF) 50 mcg Intravenous Q1H PRN Lisa Milner MD    gabapentin 100 mg Oral HS Sarabjit Mejia PA-C    heparin (porcine) 3-30 Units/kg/hr (Order-Specific) Intravenous Titrated Talita Elizabeth PA-C Last Rate: 23 1 Units/kg/hr (01/31/19 2100)   insulin lispro 5-25 Units Subcutaneous Q6H [address] Riley Osuna PA-C    metoprolol 2 5 mg Intravenous Q6H PRN Javier Smith MD    NxStage K 4/Ca 3 20,000 mL Dialysis Continuous Marilia Diane Brownlee DO Last Rate: 0 mL (02/01/19 0011)   nystatin  Topical BID Riley sOuna PA-C    oxyCODONE 5 mg Oral Q4H PRN Javier Smith MD    polyethylene glycol 17 g Oral Daily KATHY Mathias    polyvinyl alcohol 1 drop Both Eyes Q3H PRN Jeanne Romo PA-C    potassium-sodium phosphateS 2 tablet Oral BID With Meals Javier Smith MD    senna 8 8 mg Oral HS Riley Osuna PA-C      Continuous Infusions:  amiodarone 1 mg/min Last Rate: 1 mg/min (01/31/19 1636)   dexmedetomidine 0 1-0 7 mcg/kg/hr Last Rate: 0 7 mcg/kg/hr (02/01/19 0619)   fentaNYL 50 mcg/hr Last Rate: 50 mcg/hr (01/31/19 1107)   heparin (porcine) 3-30 Units/kg/hr (Order-Specific) Last Rate: 23 1 Units/kg/hr (01/31/19 2100)   NxStage K 4/Ca 3 20,000 mL Last Rate: 0 mL (02/01/19 0011)     PRN Meds:    acetaminophen 650 mg Q6H PRN   albuterol 2 5 mg Q4H PRN   bisacodyl 10 mg Daily PRN   fentanyl citrate (PF) 50 mcg Q1H PRN   metoprolol 2 5 mg Q6H PRN   oxyCODONE 5 mg Q4H PRN   polyvinyl alcohol 1 drop Q3H PRN     VTE Pharmacologic Prophylaxis: Heparin Drip  VTE Mechanical Prophylaxis: sequential compression device     Invasive lines and devices:   Invasive Devices     Central Venous Catheter Line            CVC Central Lines 01/24/19 Triple Left Internal jugular 7 days          Peripheral Intravenous Line            Peripheral IV 01/31/19 Right Antecubital 1 day          Arterial Line            Arterial Line 01/24/19 Right Radial 7 days          Line            Temporary HD Catheter 7 days          Drain            NG/OG/Enteral Tube Orogastric 14 Fr Right mouth 8 days          Airway            ETT  Cuffed 7 5 mm 8 days                     Portions of the record may have been created with voice recognition software  Occasional wrong word or "sound a like" substitutions may have occurred due to the inherent limitations of voice recognition software  Read the chart carefully and recognize, using context, where substitutions have occurred      Allan Mayers MD

## 2019-02-01 NOTE — PROGRESS NOTES
Progress Note - Infectious Disease   Caleb Street 60 y o  male MRN: [id number]  Unit/Bed#: [facility] 11 Encounter: [id number]      Impression/Plan:  1  Severe sepsis/septic shock   Fever, tachycardia, leukocytosis, hypotension   Also with component of cardiogenic shock  Likely precipitated by MSSA bacteremia   No other clear evidence of acute infection identified   Fevers, procalcitonin  much improved   Leukocytosis remains persistent, but patient is also on stress dose steroids  Otherwise patient clinically much improved with clearance of bacteremia   Remains off of vasopressors   No fevers      -antibiotic plan as below  -monitor temperatures and hemodynamics  -check CBC in a m   -supportive care      2  MSSA bacteremia   Strong possibility of endocarditis in the setting of bioprosthetic AVR   CHON with no evidence of valvular vegetations   Initial portal of entry may have been related to multiple upper back wounds, which appear scabbed over with no overt evidence of acute infection on exam   CT chest/abdomen/pelvis with no other evidence of acute infection  Possible developing pneumonia as sputum culture was positive for MSSA   Bacteremia has been prolonged but fortunately, most recent blood cultures remain negative      -continue with renally dosed IV cefazolin as patient continues to clinically improve  -will require prolonged course of IV antibiotic of likely 6 weeks in the setting of prolonged bacteremia and bioprosthetic AVR, which can be dosed with hemodialysis if he remains on it  -given repeat cultures are without growth and patient has clinically improved there is no ID contraindication at this time for PermCath placement for continued dialysis needs      3  History of bioprosthetic AVR   Secondary to degenerative AS   Placed in July 2014  Edith Link no evidence of endocarditis      4  Acute kidney injury   Likely ischemic/septic ATN   Currently on CRRT and is now plan for transitioned to hemodialysis   Nephrology following closely  Antibiotics have been dose adjusted for HD and patient is cleared from an ID standpoint for PermCath placement      5  Acute hypoxic respiratory failure   Likely in the setting of septic shock as well as acute systolic cardiomyopathy/volume overload   Recent CT chest with no evidence to suggest pulmonary infection   Most recent chest x-ray continues to suggest significant volume overload      -wean off ventilator as able  -monitor secretions  -monitor O2 requirements  -continue with volume removal     6  Elevated CPK   Unclear etiology   Patient with no reported recent fall or traumatic injury   CPK level much improved      7  Abnormal LFTs   Likely secondary to shock state   LFTs much improved  Above plan discussed in detail with critical care resident  ID consult service will formally re-evaluate the patient again on Monday  Please contact on-call attending if any additional questions or concerns over the weekend  Antibiotics:  Cefazolin    24 hr events:  Patient episode of airway obstruction from mucus plugging overnight  Currently afebrile  White blood cell count 20 9  Repeat cultures without growth  No new images overnight  Patient's other vitals stable    Subjective:  Patient remains intubated  Eyes are open on the vent and he follows simple commands  He remains on CRRT with plans transitioned to hemodialysis  Discussed with ICU resident and major issues overnight have been controlling his heart rate  Otherwise he has been relatively stable  Objective:  Vitals:  Temp:  [98 3 °F (36 8 °C)-99 5 °F (37 5 °C)] 98 7 °F (37 1 °C)  HR:  [] 84  Resp:  [20-47] 25  SpO2:  [93 %-100 %] 96 %  Temp (24hrs), Av 9 °F (37 2 °C), Min:98 3 °F (36 8 °C), Max:99 5 °F (37 5 °C)  Current: Temperature: 98 7 °F (37 1 °C)    Physical Exam:   General Appearance:  He is intubated but is awake and will follow simple commands    He appears older than stated age and has diffuse edema in his extremities  Throat: ET tube in place with only thin secretions noted  Lungs:   Vented breath sounds appreciated throughout; no wheezes, rhonchi or rales; respirations unlabored on ventilator   Heart:  Irregular rhythm; no murmur, rub or gallop though distant heart sounds   Abdomen:   Soft, non-tender, non-distended, minimal bowel sounds  Obese abdomen  Extremities: Patient has nonpitting edema in both the upper and lower extremities  There is no overt cyanosis or clubbing  Neuro: Again patient is awake and is able to nod yes or no to certain questions  He is able to move his fingers and wiggle his toes on command  Skin: No new rashes or lesions  No new draining wounds noted  Patient's central line sites appear clear and intact  He has ongoing wounds noted on his feet bilaterally and toes are unkept  Labs, Imaging, & Other studies:   All pertinent labs and imaging studies were personally reviewed    Results from last 7 days  Lab Units 02/01/19  0605 01/31/19  0545 01/30/19  0607   WBC Thousand/uL 20 98* 21 04* 26 10*   HEMOGLOBIN g/dL 10 1* 10 5* 11 1*   PLATELETS Thousands/uL 140* 149 131*       Results from last 7 days  Lab Units 02/01/19  0605  01/27/19  0512  01/26/19  0601   POTASSIUM mmol/L 4 5  < > 4 1  < > 4 2   CHLORIDE mmol/L 104  < > 107  < > 107   CO2 mmol/L 23  < > 22  < > 21   BUN mg/dL 35*  < > 33*  < > 36*   CREATININE mg/dL 2 13*  < > 2 54*  < > 2 89*   EGFR ml/min/1 73sq m 33  < > 27  < > 23   CALCIUM mg/dL 8 4  < > 8 3  < > 8 2*   AST U/L 80*  --  277*  279*  --  551*   ALT U/L 19  --  59  64  --  159*   ALK PHOS U/L 108  --  90  88  --  79   < > = values in this interval not displayed  Results from last 7 days  Lab Units 01/27/19  1055 01/27/19  1039 01/26/19  0502   BLOOD CULTURE  No Growth After 4 Days  No Growth After 4 Days   Staphylococcus aureus*  Staphylococcus aureus*   GRAM STAIN RESULT   --   --  Gram positive cocci in clusters  Gram positive cocci in clusters

## 2019-02-01 NOTE — RESPIRATORY THERAPY NOTE
Respiratory Care      01/31/19 3666   Respiratory Assessment   Assessment Type Assess only   General Appearance Sedated   Respiratory Pattern Assisted   Bilateral Breath Sounds Rhonchi   Suction ET Tube   Resp Comments Pt w/episode of airway obstruction from mucous in ETT  Pt lavaged and bagged several times  Clear to white secretions removed  No obvious mucous 'plug' seen  Vent variables returned to baseline  No desaturations during episode     Airway Suctioning/Secretions   Suction Type Endotracheal tube   Suction Device Inline   Secretion Amount Large   Secretion Color White   Secretion Consistency Thick

## 2019-02-01 NOTE — MALNUTRITION/BMI
This medical record reflects one or more clinical indicators suggestive of morbid obesity  Malnutrition Findings:              BMI Findings:  BMI Classifications: Morbid Obesity 50-59 9     Body mass index is 54 4 kg/m²  See Nutrition note dated 2/1/2019 for additional details  Completed nutrition assessment is viewable in the nutrition documentation

## 2019-02-01 NOTE — PROGRESS NOTES
Cardiology Progress Note - Janelle Kim 60 y o  male MRN: [id number]    [facility] En[facility]: [id number]      Assessment:  Principal Problem:    Cardiogenic shock ([facility] 75 )  Active Problems:    Increased BMI    NSTEMI (non-ST elevated myocardial infarction) ([facility] )    PATRICK (acute kidney injury) ([facility] 75 )    Stress-induced cardiomyopathy    Acute respiratory failure with hypoxia ([place] [address] )    History of aortic valve replacement with bioprosthetic valve    Atrial fibrillation (HCC)    Septic shock (HCC)    Staphylococcus aureus bacteremia    Transaminitis    Thrombocytopenia (HCC)    Anemia    Leukocytosis    Plan for today  - Continue IV Amiodarone 1mg/hr- transition to oral tomorrow  - On IV Metoprolol 2 5mg q6h prn  - on anticoagulation with IV Heparin        Plan:  1  HFrEF- LVEF-35%, LVIDd-6 5 cm- Cardiomyopathy in the setting of Septic shock with possible component of cardiogenic shock- Biventricular failure  - Clinically warm  - Stable off Milrinone  - I/O-4014/3536 with net positive 478  - Echo 1/25- Upper normal LV size with LVEF-30%, Concentric hypertrophy, Dilated RV with reduced function, Dyssynergic septum  Bioprosthetic aortic valve- not very well visualized- mean gradient of 13  - His cardiomyopathy is predominantly sepsis mediated myocardial dysfunction     2  Septic shock- with gram positive bacteremia- MSSA- unclear source  - On Cefazolin   - CT chest 1/23- basilar consolidative lung changes not impressive to explain the degree of his septic shock and he is not high requirements on the vent itself  - CHON with no evidence of endocarditis  - Plan for eventual spine imaging given persistent bacteremia  He does have coin shaped excoriations on the upper back- wife reports scratching his upper back- possible seeding from      3   Bioprosthetic AVR for severe degenerative AS- 25 mm Champagne Magna in July 2014  - Gradients across the valve in the past have been around 21, current gradients are 13- CHON- no endocarditis     4  Troponin elevation  - Troponins elevated to above 40, this is in the setting of septic shock  - EKG with no ischemic changes  - Normal cath in 2014  5  New onset Atrial fibrillation in the setting of sepsis  - continue IV amiodarone -1 milligram/hour  - on p r n  metoprolol 2 5 mg q 6 hours  - on anticoagulation with IV Heparin       6  PATRICK- in the setting of septic shock  - on CRRT- being transitioned to HD     7  Transaminitis          Subjective:   Patient seen and examined  On CRRT- being transitioned to HD  Off pressors, failed SBT- became tachypneic and tachycardic  Off sedation, following commands  Telemetry- Atrial fibrillation with ventricular rates of 70-90 with intermittent episodes of heart rate up to 130    Objective:     Vitals: Blood pressure (!) 87/49, pulse 72, temperature 98 2 °F (36 8 °C), temperature source Oral, resp  rate (!) 24, height 5' 9" (1 753 m), weight (!) 167 kg (368 lb 6 2 oz), SpO2 97 %  , Body mass index is 54 4 kg/m² ,   Orthostatic Blood Pressures      Most Recent Value   Blood Pressure   87/49 filed at 01/28/2019 0941   Patient Position - Orthostatic VS  Lying filed at 02/01/2019 0800            Intake/Output Summary (Last 24 hours) at 02/01/19 1422  Last data filed at 02/01/19 1400   Gross per 24 hour   Intake           3910 2 ml   Output             4158 ml   Net           -247 8 ml         Physical Exam:    GEN: Juan Alberto Blush intubated and sedated, follows commands  HEENT: anicteric, mucous membranes moist  NECK: no jvd, no carotid bruits, right and left central lines  HEART: Irregular rhythm, normal S1 and S2, tachycardic, no murmurs, clicks, gallops or rubs   LUNGS: rhonchi in the anterior lung fields   ABDOMEN: normal bowel sounds, soft, no tenderness, no distention  EXTREMITIES: peripheral pulses 1+; bluish discoloration of the toes, warm feet  NEURO: no focal findings   SKIN: coin shaped excoriations of the upper back      Current Facility-Administered Medications:     acetaminophen (TYLENOL) oral suspension 650 mg, 650 mg, Oral, Q6H PRN, Era Saenz MD, 650 mg at 19 194    albuterol inhalation solution 2 5 mg, 2 5 mg, Nebulization, Q4H PRN, Rivas Negron PA-C    [COMPLETED] amiodarone 150 mg in dextrose 5 % 100 mL IV bolus, 150 mg, Intravenous, Once, Stopped at 19 1519 **FOLLOWED BY** [] amiodarone (CORDARONE) 900 mg in dextrose 5 % 500 mL infusion, 1 mg/min, Intravenous, Continuous, Stopped at 19 2129 **FOLLOWED BY** amiodarone (CORDARONE) 900 mg in dextrose 5 % 500 mL infusion, 1 mg/min, Intravenous, Continuous, Olga Gallardo MD, Last Rate: 33 3 mL/hr at 19 0834, 1 mg/min at 19 0834    bisacodyl (DULCOLAX) rectal suppository 10 mg, 10 mg, Rectal, Daily PRN, Rivas Negron PA-C    calcium gluconate 1 g in sodium chloride 0 9 % 100 mL IVPB, 1 g, Intravenous, Once, Veronica Porras MD, Last Rate: 50 mL/hr at 19 1338, 1 g at 19 1338    [START ON 2019] ceFAZolin (ANCEF) 1 g in sodium chloride 0 9% 50 ml IVPB, 1,000 mg, Intravenous, Q24H, Olga Gallardo MD    chlorhexidine (PERIDEX) 0 12 % oral rinse 15 mL, 15 mL, Swish & Spit, Q12H [address], Rivas Negron PA-C, 15 mL at 19 7581    dexmedetomidine (PRECEDEX) 200 mcg in sodium chloride 0 9 % 50 mL infusion, 0 1-0 7 mcg/kg/hr, Intravenous, Titrated, Era Saenz MD, Last Rate: 25 4 mL/hr at 19 1341, 0 7 mcg/kg/hr at 19 1341    fentaNYL 1250 mcg in sodium chloride 0 9% 125mL drip, 50 mcg/hr, Intravenous, Titrated, Olive Umanzor PA-C, Last Rate: 5 mL/hr at 19 1357, 50 mcg/hr at 19 1357    gabapentin (NEURONTIN) capsule 100 mg, 100 mg, Oral, HS, Eduardo Mejia PA-C, 100 mg at 19 2117    heparin (porcine) 25,000 units in 250 mL infusion (premix), 3-30 Units/kg/hr (Order-Specific), Intravenous, Titrated, Eduardo Mejia PA-C, Last Rate: 19 mL/hr at 19 0850, 21 1 Units/kg/hr at 02/01/19 0850    HYDROmorphone (DILAUDID) injection 1 mg, 1 mg, Intravenous, Q3H PRN, Lidya Martinez    insulin lispro (HumaLOG) 100 units/mL subcutaneous injection 5-25 Units, 5-25 Units, Subcutaneous, Q6H [address], 5 Units at 01/30/19 2355 **AND** Fingerstick Glucose (POCT), , , Q6H, Winsome Melchor PA-C    metoprolol (LOPRESSOR) injection 2 5 mg, 2 5 mg, Intravenous, Q6H PRN, Brook Howard MD    nystatin (MYCOSTATIN) powder, , Topical, BID, Winsome Melchor PA-C    oxyCODONE (ROXICODONE) oral solution 10 mg, 10 mg, Oral, Q4H PRN, Lidya Martinez    polyethylene glycol (MIRALAX) packet 17 g, 17 g, Oral, Daily, KATHY Mathias, 17 g at 01/31/19 9414    polyvinyl alcohol (LIQUIFILM TEARS) 1 4 % ophthalmic solution 1 drop, 1 drop, Both Eyes, Q3H PRN, Annia Durán PA-C, 1 drop at 01/31/19 1649    potassium-sodium phosphateS (K-PHOS,PHOSPHA 250) -250 mg tablet 2 tablet, 2 tablet, Oral, BID With Meals, Brook Howard MD, 2 tablet at 01/31/19 1110    senna 8 8 mg/5 mL oral syrup 8 8 mg, 8 8 mg, Oral, HS, Winsome Melchor PA-C, 8 8 mg at 01/31/19 2117    Labs & Results:    Lab Results   Component Value Date    CKTOTAL 463 (H) 02/01/2019    CKTOTAL 3,180 (H) 01/27/2019    CKTOTAL 10,421 (H) 01/26/2019    CKMB 3 9 02/01/2019    CKMB 14 9 (H) 01/27/2019    CKMB 38 7 (H) 01/26/2019    CKMBINDEX <1 0 02/01/2019    CKMBINDEX <1 0 01/27/2019    CKMBINDEX <1 0 01/26/2019    TROPONINI 36 20 (H) 01/24/2019    TROPONINI 36 30 (H) 01/24/2019    TROPONINI 34 90 (H) 01/24/2019       Lab Results   Component Value Date    GLUCOSE 92 04/09/2015    CALCIUM 8 3 02/01/2019     04/09/2015    K 4 6 02/01/2019    CO2 25 02/01/2019     02/01/2019    BUN 36 (H) 02/01/2019    CREATININE 2 13 (H) 02/01/2019       Lab Results   Component Value Date    WBC 20 98 (H) 02/01/2019    HGB 10 1 (L) 02/01/2019    HCT 32 8 (L) 02/01/2019    MCV 86 02/01/2019     (L) 02/01/2019       Results from last 7 days  Lab Units 01/29/19  1057   INR  1 07       Lab Results   Component Value Date    CHOL 146 07/18/2014    CHOL 145 02/21/2014     Lab Results   Component Value Date    HDL 41 06/02/2018    HDL 52 06/27/2017     Lab Results   Component Value Date    LDLCALC 57 06/02/2018    LDLCALC 129 (H) 06/27/2017     Lab Results   Component Value Date    TRIG 102 06/02/2018    TRIG 71 06/27/2017       Lab Results   Component Value Date    ALT 19 02/01/2019    AST 80 (H) 02/01/2019

## 2019-02-01 NOTE — NUTRITION
Patient will continue to tolerate current TF Rx goal rate of Nepro @ 52 ml/hr  Monitor LFTs and renal parameters

## 2019-02-02 ENCOUNTER — APPOINTMENT (INPATIENT)
Dept: DIALYSIS | Facility: HOSPITAL | Age: 59
DRG: 871 | End: 2019-02-02
Payer: COMMERCIAL

## 2019-02-02 LAB
ANION GAP SERPL CALCULATED.3IONS-SCNC: 7 MMOL/L (ref 4–13)
ANISOCYTOSIS BLD QL SMEAR: PRESENT
APTT PPP: 86 SECONDS (ref 26–38)
ATRIAL RATE: 132 BPM
BASOPHILS # BLD MANUAL: 0 THOUSAND/UL (ref 0–0.1)
BASOPHILS NFR MAR MANUAL: 0 % (ref 0–1)
BUN SERPL-MCNC: 43 MG/DL (ref 5–25)
CA-I BLD-SCNC: 1.1 MMOL/L (ref 1.12–1.32)
CALCIUM SERPL-MCNC: 8 MG/DL (ref 8.3–10.1)
CHLORIDE SERPL-SCNC: 102 MMOL/L (ref 100–108)
CO2 SERPL-SCNC: 24 MMOL/L (ref 21–32)
CREAT SERPL-MCNC: 2.61 MG/DL (ref 0.6–1.3)
EOSINOPHIL # BLD MANUAL: 0 THOUSAND/UL (ref 0–0.4)
EOSINOPHIL NFR BLD MANUAL: 0 % (ref 0–6)
ERYTHROCYTE [DISTWIDTH] IN BLOOD BY AUTOMATED COUNT: 18.2 % (ref 11.6–15.1)
GFR SERPL CREATININE-BSD FRML MDRD: 26 ML/MIN/1.73SQ M
GLUCOSE SERPL-MCNC: 107 MG/DL (ref 65–140)
GLUCOSE SERPL-MCNC: 119 MG/DL (ref 65–140)
GLUCOSE SERPL-MCNC: 131 MG/DL (ref 65–140)
GLUCOSE SERPL-MCNC: 133 MG/DL (ref 65–140)
HCT VFR BLD AUTO: 28.3 % (ref 36.5–49.3)
HGB BLD-MCNC: 8.7 G/DL (ref 12–17)
HYPERCHROMIA BLD QL SMEAR: PRESENT
LG PLATELETS BLD QL SMEAR: PRESENT
LYMPHOCYTES # BLD AUTO: 0.72 THOUSAND/UL (ref 0.6–4.47)
LYMPHOCYTES # BLD AUTO: 4 % (ref 14–44)
MAGNESIUM SERPL-MCNC: 2.1 MG/DL (ref 1.6–2.6)
MCH RBC QN AUTO: 26.4 PG (ref 26.8–34.3)
MCHC RBC AUTO-ENTMCNC: 30.7 G/DL (ref 31.4–37.4)
MCV RBC AUTO: 86 FL (ref 82–98)
METAMYELOCYTES NFR BLD MANUAL: 2 % (ref 0–1)
MONOCYTES # BLD AUTO: 0.72 THOUSAND/UL (ref 0–1.22)
MONOCYTES NFR BLD: 4 % (ref 4–12)
MYELOCYTES NFR BLD MANUAL: 1 % (ref 0–1)
NEUTROPHILS # BLD MANUAL: 16.09 THOUSAND/UL (ref 1.85–7.62)
NEUTS SEG NFR BLD AUTO: 89 % (ref 43–75)
NRBC BLD AUTO-RTO: 0 /100 WBCS
PHOSPHATE SERPL-MCNC: 4.1 MG/DL (ref 2.7–4.5)
PLATELET # BLD AUTO: 131 THOUSANDS/UL (ref 149–390)
PLATELET BLD QL SMEAR: ABNORMAL
PMV BLD AUTO: 12.2 FL (ref 8.9–12.7)
POLYCHROMASIA BLD QL SMEAR: PRESENT
POTASSIUM SERPL-SCNC: 4.3 MMOL/L (ref 3.5–5.3)
QRS AXIS: 108 DEGREES
QRSD INTERVAL: 163 MS
QT INTERVAL: 400 MS
QTC INTERVAL: 593 MS
RBC # BLD AUTO: 3.3 MILLION/UL (ref 3.88–5.62)
RBC MORPH BLD: PRESENT
SODIUM SERPL-SCNC: 133 MMOL/L (ref 136–145)
T WAVE AXIS: -52 DEGREES
TARGETS BLD QL SMEAR: PRESENT
VENTRICULAR RATE: 132 BPM
WBC # BLD AUTO: 18.08 THOUSAND/UL (ref 4.31–10.16)

## 2019-02-02 PROCEDURE — 94760 N-INVAS EAR/PLS OXIMETRY 1: CPT

## 2019-02-02 PROCEDURE — 93010 ELECTROCARDIOGRAM REPORT: CPT | Performed by: INTERNAL MEDICINE

## 2019-02-02 PROCEDURE — 85730 THROMBOPLASTIN TIME PARTIAL: CPT | Performed by: INTERNAL MEDICINE

## 2019-02-02 PROCEDURE — 82330 ASSAY OF CALCIUM: CPT | Performed by: INTERNAL MEDICINE

## 2019-02-02 PROCEDURE — 83735 ASSAY OF MAGNESIUM: CPT | Performed by: INTERNAL MEDICINE

## 2019-02-02 PROCEDURE — 85007 BL SMEAR W/DIFF WBC COUNT: CPT | Performed by: EMERGENCY MEDICINE

## 2019-02-02 PROCEDURE — 99291 CRITICAL CARE FIRST HOUR: CPT | Performed by: INTERNAL MEDICINE

## 2019-02-02 PROCEDURE — 84100 ASSAY OF PHOSPHORUS: CPT | Performed by: INTERNAL MEDICINE

## 2019-02-02 PROCEDURE — 82948 REAGENT STRIP/BLOOD GLUCOSE: CPT

## 2019-02-02 PROCEDURE — 99233 SBSQ HOSP IP/OBS HIGH 50: CPT | Performed by: INTERNAL MEDICINE

## 2019-02-02 PROCEDURE — 94003 VENT MGMT INPAT SUBQ DAY: CPT

## 2019-02-02 PROCEDURE — 80048 BASIC METABOLIC PNL TOTAL CA: CPT | Performed by: INTERNAL MEDICINE

## 2019-02-02 PROCEDURE — 85027 COMPLETE CBC AUTOMATED: CPT | Performed by: EMERGENCY MEDICINE

## 2019-02-02 RX ORDER — AMIODARONE HYDROCHLORIDE 200 MG/1
200 TABLET ORAL
Status: DISCONTINUED | OUTPATIENT
Start: 2019-02-02 | End: 2019-02-07

## 2019-02-02 RX ADMIN — DEXMEDETOMIDINE 0.7 MCG/KG/HR: 100 INJECTION, SOLUTION, CONCENTRATE INTRAVENOUS at 15:49

## 2019-02-02 RX ADMIN — DEXMEDETOMIDINE 0.7 MCG/KG/HR: 100 INJECTION, SOLUTION, CONCENTRATE INTRAVENOUS at 13:17

## 2019-02-02 RX ADMIN — DEXMEDETOMIDINE 0.7 MCG/KG/HR: 100 INJECTION, SOLUTION, CONCENTRATE INTRAVENOUS at 23:13

## 2019-02-02 RX ADMIN — DEXMEDETOMIDINE 0.7 MCG/KG/HR: 100 INJECTION, SOLUTION, CONCENTRATE INTRAVENOUS at 08:00

## 2019-02-02 RX ADMIN — GABAPENTIN 100 MG: 100 CAPSULE ORAL at 21:30

## 2019-02-02 RX ADMIN — MIDODRINE HYDROCHLORIDE 10 MG: 5 TABLET ORAL at 18:22

## 2019-02-02 RX ADMIN — POLYETHYLENE GLYCOL 3350 17 G: 17 POWDER, FOR SOLUTION ORAL at 08:45

## 2019-02-02 RX ADMIN — DEXMEDETOMIDINE 0.7 MCG/KG/HR: 100 INJECTION, SOLUTION, CONCENTRATE INTRAVENOUS at 18:22

## 2019-02-02 RX ADMIN — DEXMEDETOMIDINE 0.7 MCG/KG/HR: 100 INJECTION, SOLUTION, CONCENTRATE INTRAVENOUS at 02:50

## 2019-02-02 RX ADMIN — DEXMEDETOMIDINE 0.7 MCG/KG/HR: 100 INJECTION, SOLUTION, CONCENTRATE INTRAVENOUS at 20:54

## 2019-02-02 RX ADMIN — AMIODARONE HYDROCHLORIDE 1 MG/MIN: 50 INJECTION, SOLUTION INTRAVENOUS at 00:12

## 2019-02-02 RX ADMIN — DEXMEDETOMIDINE 0.7 MCG/KG/HR: 100 INJECTION, SOLUTION, CONCENTRATE INTRAVENOUS at 00:21

## 2019-02-02 RX ADMIN — DEXMEDETOMIDINE 0.7 MCG/KG/HR: 100 INJECTION, SOLUTION, CONCENTRATE INTRAVENOUS at 10:39

## 2019-02-02 RX ADMIN — NYSTATIN: 100000 POWDER TOPICAL at 21:30

## 2019-02-02 RX ADMIN — AMIODARONE HYDROCHLORIDE 200 MG: 200 TABLET ORAL at 11:10

## 2019-02-02 RX ADMIN — OXYCODONE HYDROCHLORIDE 10 MG: 5 SOLUTION ORAL at 08:45

## 2019-02-02 RX ADMIN — HEPARIN SODIUM AND DEXTROSE 21.1 UNITS/KG/HR: 10000; 5 INJECTION INTRAVENOUS at 11:10

## 2019-02-02 RX ADMIN — DEXMEDETOMIDINE 0.7 MCG/KG/HR: 100 INJECTION, SOLUTION, CONCENTRATE INTRAVENOUS at 05:07

## 2019-02-02 RX ADMIN — MIDODRINE HYDROCHLORIDE 10 MG: 5 TABLET ORAL at 06:10

## 2019-02-02 RX ADMIN — CHLORHEXIDINE GLUCONATE 0.12% ORAL RINSE 15 ML: 1.2 LIQUID ORAL at 21:30

## 2019-02-02 RX ADMIN — NYSTATIN: 100000 POWDER TOPICAL at 08:45

## 2019-02-02 RX ADMIN — MIDODRINE HYDROCHLORIDE 10 MG: 5 TABLET ORAL at 11:10

## 2019-02-02 RX ADMIN — CHLORHEXIDINE GLUCONATE 0.12% ORAL RINSE 15 ML: 1.2 LIQUID ORAL at 08:45

## 2019-02-02 RX ADMIN — CEFAZOLIN SODIUM 1000 MG: 10 INJECTION, POWDER, FOR SOLUTION INTRAVENOUS at 18:22

## 2019-02-02 NOTE — PLAN OF CARE
GENITOURINARY - ADULT     Maintains or returns to baseline urinary function Not Progressing        SAFETY ADULT     Maintain or return to baseline ADL function Not Progressing     Maintain or return mobility status to optimal level Not Progressing          CARDIOVASCULAR - ADULT     Maintains optimal cardiac output and hemodynamic stability Progressing     Absence of cardiac dysrhythmias or at baseline rhythm Progressing        DISCHARGE PLANNING     Discharge to home or other facility with appropriate resources Progressing        DISCHARGE PLANNING - CARE MANAGEMENT     Discharge to post-acute care or home with appropriate resources Progressing        GENITOURINARY - ADULT     Absence of urinary retention Progressing        INFECTION - ADULT     Absence or prevention of progression during hospitalization Progressing        Knowledge Deficit     Patient/family/caregiver demonstrates understanding of disease process, treatment plan, medications, and discharge instructions Progressing        METABOLIC, FLUID AND ELECTROLYTES - ADULT     Electrolytes maintained within normal limits Progressing     Fluid balance maintained Progressing        Nutrition/Hydration-ADULT     Nutrient/Hydration intake appropriate for improving, restoring or maintaining nutritional needs Progressing        PAIN - ADULT     Verbalizes/displays adequate comfort level or baseline comfort level Progressing        Potential for Falls     Patient will remain free of falls Progressing        Prexisting or High Potential for Compromised Skin Integrity     Skin integrity is maintained or improved Progressing        SAFETY ADULT     Patient will remain free of falls Progressing        SAFETY,RESTRAINT: NV/NON-SELF DESTRUCTIVE BEHAVIOR     Remains free of harm/injury (restraint for non violent/non self-detsructive behavior) Progressing     Returns to optimal restraint-free functioning Progressing

## 2019-02-02 NOTE — RESPIRATORY THERAPY NOTE
RT Ventilator Management Note  Mervat Galloway 60 y o  male MRN: [id number]  [facility] [id number]      Daily Screen       1/30/2019 0822 1/31/2019 0710          Patient safety screen outcome[de-identified] - Failed      Not Ready for Weaning due to[de-identified] - Underline problem not resolved      Spont breathing trial outcome[de-identified] Failed -      Spont breathing trial reason failed: RR > 35 bpm -      Previous settings resumed: Yes -      Name of Medical Team Notified[de-identified] R N  NOTIFIED  -              Physical Exam:   Assessment Type: Assess only  General Appearance: Sleeping  Respiratory Pattern: Assisted  Chest Assessment: Chest expansion symmetrical  Bilateral Breath Sounds: Diminished, Coarse  Cough: None  Suction: ET Tube  O2 Device: vent      Resp Comments: con't on a/c settings 16/450/40%/+5 as ordered, blanca well, no resp distress, vs stable, will con't to monitor

## 2019-02-02 NOTE — PROGRESS NOTES
Progress Note - Nephrology   Deepak Reeves 60 y o  male MRN: [id number]  Unit/Bed#: [facility] 11 Encounter: [id number]      Assessment / Plan:  1  anuric PATRICK, baseline serum creatinine 0 7 -0 8  -PATRICK most likely secondary to ischemic/septic ATN, rhabdomyolysis  -due to progressive worsening renal failure, concern for mild volume overload, worsening acidosis, patient was started on CRRT on 19      -off CRRT , plan for IHD today  Did receive IHD  as well  -Avoid nephrotoxins or NSAIDs  -patient anuric  Monitor for signs of renal recovery   -assess daily for IHD needs  -transition temporary HD catheter to permacath by IR when able     2  Septic shock/component of cardiogenic shock, MSSA bacteremia  -CHON did not show evidence of valvular vegetations    -antibiotic as per ICU team   Now off pressors  BP improved on midodrine       3  Metabolic acidosis/lactic acidosis - resolving on CRRT  -likely secondary to worsened renal failure in the setting of septic shock      4  Volume overload  -UF removal as tolerated with HD today  -CHF with EF 30%  [facility] cardiology follow-up  -still anuric     5  History of aortic stenosis, status post bioprosthetic AVR in      6  VDRF s/p intubation - on 40% FIO2     7  Hyponatremia, mild - likely dilutional in setting of volume overload, correct with UF on HD     8  Anemia of critical illness - Hgb stable in 10-->8s, ? Dilutional, continue to monitor  Have discussed with critical care  Subjective:   Patient denies any pain  He is intubated state HPI review of systems is limited  He is on amiodarone drip  Objective:     Vitals: Blood pressure 97/57, pulse 92, temperature 98 8 °F (37 1 °C), temperature source Oral, resp  rate (!) 26, height 5' 9" (1 753 m), weight (!) 168 kg (370 lb 2 4 oz), SpO2 94 %  ,Body mass index is 54 66 kg/m²  Temp (24hrs), Av 1 °F (37 3 °C), Min:98 8 °F (37 1 °C), Max:99 6 °F (37 6 °C)      Weight (last 2 days)     Date/Time Weight    02/02/19 0541  (!)  168 (370 15)    02/01/19 0500  (!)  167 (368 39)    01/31/19 0600  (!)  167 (368 83)                Intake/Output Summary (Last 24 hours) at 02/02/19 1003  Last data filed at 02/02/19 8956   Gross per 24 hour   Intake          3477 63 ml   Output             1086 ml   Net          2391 63 ml     I/O last 24 hours: In: 4036 5 [I V :2488 4; IV Piggyback:476 1; Feedings:1072]  Out: 1651 [Other:1651]        Physical Exam:   Physical Exam   Constitutional: He appears well-developed and well-nourished  No distress  obese   HENT:   Head: Normocephalic and atraumatic  Mouth/Throat: No oropharyngeal exudate  +ETT   Eyes: Right eye exhibits no discharge  Left eye exhibits no discharge  No scleral icterus  Neck: Neck supple  Cardiovascular: Normal rate and normal heart sounds  Irregular rhythm   Pulmonary/Chest: Effort normal and breath sounds normal  He has no wheezes  He has no rales  Abdominal: Soft  Bowel sounds are normal  He exhibits no distension  There is no tenderness  Musculoskeletal: He exhibits edema (+anasarca)  Neurological: He is alert  awake   Skin: Skin is warm and dry  No rash noted  He is not diaphoretic  Psychiatric:   Flat affect   Vitals reviewed        Invasive Devices     Central Venous Catheter Line            CVC Central Lines 01/24/19 Triple Left Internal jugular 8 days          Peripheral Intravenous Line            Peripheral IV 01/31/19 Right Antecubital 2 days          Arterial Line            Arterial Line 01/24/19 Right Radial 8 days          Line            Temporary HD Catheter 8 days          Drain            NG/OG/Enteral Tube Orogastric less than 1 day          Airway            ETT  Cuffed 7 5 mm 9 days                Medications:    Scheduled Meds:  Current Facility-Administered Medications:  acetaminophen 650 mg Oral Q6H PRN Yunior Aguilar MD    albuterol 2 5 mg Nebulization Q4H PRN Yany Paul PA-C    amiodarone 1 mg/min Intravenous Continuous Olga Gallardo MD Last Rate: 1 mg/min (02/02/19 0012)   bisacodyl 10 mg Rectal Daily PRN Rivas Negron PA-C    cefazolin 1,000 mg Intravenous Q24H Olga Gallardo MD    chlorhexidine 15 mL Swish & Spit Q12H [place], [place]    dexmedetomidine 0 1-0 7 mcg/kg/hr Intravenous Titrated Era Saenz MD Last Rate: 0 7 mcg/kg/hr (02/02/19 0800)   fentaNYL 50 mcg/hr Intravenous Titrated Clary Rich PA-C Last Rate: 50 mcg/hr (02/01/19 1357)   gabapentin 100 mg Oral HS Eduardo Mejia PA-C    heparin (porcine) 3-30 Units/kg/hr (Order-Specific) Intravenous Titrated Eduardo Mejia PA-C Last Rate: 21 1 Units/kg/hr (02/01/19 2200)   HYDROmorphone 1 mg Intravenous Q3H PRN Lidya Martinez    insulin lispro 5-25 Units Subcutaneous Q6H [facility] Rivas Negron PA-C    metoprolol 2 5 mg Intravenous Q6H PRN Olga Gallardo MD    midodrine 10 mg Oral TID AC Myriam Rosa PA-C    nystatin  Topical BID Rivas Negron PA-C    oxyCODONE 10 mg Oral Q4H PRN Lidya Martinez    polyethylene glycol 17 g Oral Daily KATHY Mathias    polyvinyl alcohol 1 drop Both Eyes Q3H PRN Myriam Rosa PA-C    potassium-sodium phosphateS 2 tablet Oral BID With Meals Olga Gallardo MD    senna 8 8 mg Oral HS Rivas Negron PA-C        PRN Meds:   acetaminophen    albuterol    bisacodyl    HYDROmorphone    metoprolol    oxyCODONE    polyvinyl alcohol    Continuous Infusions:  amiodarone 1 mg/min Last Rate: 1 mg/min (02/02/19 0012)   dexmedetomidine 0 1-0 7 mcg/kg/hr Last Rate: 0 7 mcg/kg/hr (02/02/19 0800)   fentaNYL 50 mcg/hr Last Rate: 50 mcg/hr (02/01/19 1357)   heparin (porcine) 3-30 Units/kg/hr (Order-Specific) Last Rate: 21 1 Units/kg/hr (02/01/19 2200)           LAB RESULTS:        Results from last 7 days  Lab Units 02/02/19  0507 02/01/19  1139 02/01/19  0605 02/01/19  0005 01/31/19  1802 01/31/19  1136 01/31/19  0545  01/30/19  0607  01/29/19  0425  01/28/19  0500 01/27/19  0512   WBC Thousand/uL 18 08*  --  20 98*  --   --   --  21 04*  --  26 10*  --  20 70*  --  16 14*  --  15 47*   HEMOGLOBIN g/dL 8 7*  --  10 1*  --   --   --  10 5*  --  11 1*  --  10 2*  --  10 7*  --  11 1*   HEMATOCRIT % 28 3*  --  32 8*  --   --   --  34 1*  --  35 6*  --  32 5*  --  33 2*  --  35 1*   PLATELETS Thousands/uL 131*  --  140*  --   --   --  149  --  131*  --  87*  --  57*  --  46*   NEUTROS PCT %  --   --   --   --   --   --   --   --   --   --   --   --   --   --  82*   LYMPHS PCT %  --   --   --   --   --   --   --   --   --   --   --   --   --   --  4*   LYMPHO PCT % 4*  --  1*  --   --   --  10*  --  4*  --  1*  --  1*  --   --    MONOS PCT %  --   --   --   --   --   --   --   --   --   --   --   --   --   --  9   MONO PCT % 4  --  3*  --   --   --  7  --  1*  --  5  --  4  --   --    EOS PCT % 0  --  0  --   --   --  0  --  0  --  0  --  0  --  0   POTASSIUM mmol/L 4 3 4 6 4 5 4 7 4 6 4 5 4 5  < > 4 4  < >  --   < >  --   < > 4 1   CHLORIDE mmol/L 102 105 104 105 105 105 106  < > 106  < >  --   < >  --   < > 107   CO2 mmol/L 24 25 23 24 24 25 21  < > 24  < >  --   < >  --   < > 22   BUN mg/dL 43* 36* 35* 32* 33* 33* 31*  < > 32*  < >  --   < >  --   < > 33*   CREATININE mg/dL 2 61* 2 13* 2 13* 2 26* 2 20* 2 25* 2 10*  < > 2 23*  < >  --   < >  --   < > 2 54*   CALCIUM mg/dL 8 0* 8 3 8 4 8 1* 8 3 8 2* 8 5  < > 8 4  < >  --   < >  --   < > 8 3   ALK PHOS U/L  --   --  108  --   --   --   --   --   --   --   --   --   --   --  90  88   ALT U/L  --   --  19  --   --   --   --   --   --   --   --   --   --   --  59  64   AST U/L  --   --  80*  --   --   --   --   --   --   --   --   --   --   --  277*  279*   MAGNESIUM mg/dL 2 1 2 0 2 1 2 0 1 9 2 0 2 2  < > 2 2  < >  --   < >  --   < >  --    PHOSPHORUS mg/dL 4 1 3 0 3 2 3 4 3 3 3 1 2 8  < > 2 8  < >  --   < >  --   < >  --    < > = values in this interval not displayed      CUTURES:  Lab Results   Component Value Date    BLOODCX No Growth After 5 Days  01/27/2019    BLOODCX No Growth After 5 Days  01/27/2019    BLOODCX Staphylococcus aureus (A) 01/26/2019    BLOODCX Staphylococcus aureus (A) 01/26/2019    BLOODCX Staphylococcus aureus (A) 01/24/2019    BLOODCX Staphylococcus aureus (A) 01/24/2019    BLOODCX Staphylococcus aureus (A) 01/23/2019    URINECX 3783-7797 cfu/ml Gram Positive Cocci (A) 01/23/2019                 Portions of the record may have been created with voice recognition software  Occasional wrong word or "sound a like" substitutions may have occurred due to the inherent limitations of voice recognition software  Read the chart carefully and recognize, using context, where substitutions have occurred  If you have any questions, please contact the dictating provider

## 2019-02-02 NOTE — PROGRESS NOTES
Progress Note - Critical Care   Pauline Dos Santos 60 y o  male MRN: [id number]  Unit/Bed#: [facility] 11 Encounter: [id number]    Attending Physician: Cristian Cardozo, DO      ______________________________________________________________________  Assessment and Plan:   Principal Problem:    Cardiogenic shock ([facility] )  Active Problems:    Increased BMI    NSTEMI (non-ST elevated myocardial infarction) ([facility] )    PATRICK (acute kidney injury) ([facility] )    Stress-induced cardiomyopathy    Acute respiratory failure with hypoxia ([facility] )    History of aortic valve replacement with bioprosthetic valve    Atrial fibrillation ([facility] )    Septic shock (HCC)    Staphylococcus aureus bacteremia    Transaminitis    Thrombocytopenia (HCC)    Anemia    Leukocytosis  Resolved Problems:    Acute encephalopathy    Rhabdomyolysis    60year-old, PMHx of  HTN, bioprosthetic valve admitted with shock of multifactorial etiology with PATRICK and newly diagnosed AFib with RVR     Neuro:   · Patient is alert, following commands today  · Sedation & Analgesia: Precedex, Fentanyl, Dilaudid  · Neuro checks as per unit protocol  · Take precautions to prevent ICU delirium   Monitor GCS     Peripheral Neuropathy  · Gabapentin 100 daily     Conjunctivitis  · Artificial tears     CV:   Shock, likely multifactorial,off pressors  Hydrocortisone d/c'd  Continue to monitor with MAP goal >65      New onset A fib with RVR   - Amiodarone drip    - A fib with HR is the mid 68 to 100, with intermittent HR in the 120's - 140's  - Metoprolol 2 5 mg Q6 prn   - On Heparin drip for [facility]  - per Cardiology, consider a repeat TTE when clinically improved  - continue tele monitoring  - Echo 1/25: EF 40% with moderate diffuse hypokinesis, no evidence of vegetations  - History of Bioprosthetic AoVR     NSTEMI type 2 2/2 shock   Continuous telemetry      Pulm:   Acute hypoxic respiratory failure  - Intubated on vent settings 16/450/40%/5  - SPO2 goal > 92%   -will try to wean off ventilator as tolerated  -monitor secretions        GI:   Shock liver vs hepatic congestion  · GI ppx: not indicated, pt on TF  · Bowel regimen: Miralax, senna     :   PATRICK likely 2/2 ischemia vs Septic ATN vs Rhabdo  · CRRT dc'd transitioned to IHD yesterday  · UOP -anuric   · Monitor I&Os, net + 2 3L over past 24H and  + 1 4 L since admission  · Cr stable at 2 6 (BL 0 7-0 8), GFR 26  · CK improved markedly, will stop trending  · Nephrology following         F/E/N:  1  Fluids/Electrolytes  · Will monitor  Electrolytes and Replete as needed       2   Nutrition: TF Nepro at goal of 52        ID:   MSSA bacteremia , likely 2/2 MSSA PNA in the setting of positive Sputum Culture  WBC gradually improving  Bcx 1/27 negative at  5 days  CHON negative for vegetation, EF  30%  Day # 9 of Ancef, renally dosed ,will treat for at least 6 weeks, per ID  Trend fever curve and WBC   Per ID, may consider spine imaging         Heme:   Thrombocytopenia likely consumptive, improving  Hbg- stable at 8 7, dropped from 10 1, pt has no acute bleeding, will continue to trend CBC  tranfuse as needed with goal >7  DVT ppx: On heparin drip for AC, PTT- 86, at therapeutic goal   Continue SCDs     Endo:   Blood sugars well controlled, 119-133 over the last 24 hrs  Goal of 140-180  Will continue to monitor  SSI     · Msk/Skin:  · Podiatry: trimmed toe nails and recommends strict off loading of heels to bed  Recommends reconsulting if DTI to L hallux worsens  · Turn/position 2 hourly  · Wound care   · PT/OT when appropriate      Disposition: Continued ICU stay  Code Status: Level 1 - Full Code    Counseling / Coordination of Care  Total Critical Care time spent 30 minutes excluding procedures, teaching and family updates  ______________________________________________________________________    Chief Complaint: Intubated    24 Hour Events:   Noted to be hypotensive with MAPs in the 60's  Albumin given that helped    HD with even exchange yesterday      ______________________________________________________________________    Physical Exam:   Physical Exam   Constitutional:   obese   HENT:   Head: Normocephalic and atraumatic  Eyes: EOM are normal    Cardiovascular:   Irregularly irregular   Pulmonary/Chest:   Decreased b/l   Abdominal: Bowel sounds are normal    [place] abdomen, non tender to palpation   Musculoskeletal: He exhibits edema  Neurological: He is alert  Follows commands    RASS 0    GCS 11(E4/V1/M6)   Skin:   Scabbed wound left fore arm, Left great to hallux erythema, non tender to palpation   Vitals reviewed  ______________________________________________________________________  Vitals:    19 0400 19 0500 19 0541 19 0600   BP:       BP Location:       Pulse: 80 78  92   Resp: (!) 28 (!) 24  (!) 26   Temp: 98 8 °F (37 1 °C)      TempSrc: Oral      SpO2: 96% 96%  96%   Weight:   (!) 168 kg (370 lb 2 4 oz)    Height:           Temperature:   Temp (24hrs), Av 9 °F (37 2 °C), Min:98 2 °F (36 8 °C), Max:99 6 °F (37 6 °C)    Current Temperature: 98 8 °F (37 1 °C)  Weights:   IBW: 70 7 kg    Body mass index is 54 66 kg/m²    Weight (last 2 days)     Date/Time   Weight    19 0541  (!)  168 (370 15)    19 0500  (!)  167 (368 39)    19 0600  (!)  167 (368 83)            Hemodynamic Monitoring:  N/A     Non-Invasive/Invasive Ventilation Settings:  Respiratory    Lab Data (Last 4 hours)    None         O2/Vent Data (Last 4 hours)       0347           Vent Mode AC/VC       Resp Rate (BPM) (BPM) 16       Vt (mL) (mL) 450       FIO2 (%) (%) 40       PEEP (cmH2O) (cmH2O) 5       MV 12 4                 No results found for: PHART, BFX8GJN, PO2ART, IIT1UMH, F0INTXGL, BEART, SOURCE    Intake and Outputs:  I/O       701 -  0700 701 -  0700    I V  (mL/kg) 1972 8 (11 8) 2488 4 (14 8)    NG/GT 30     IV Piggyback 886 476 1    Feedings 1125 1072    Total Intake(mL/kg) 4013 8 (24) 4036 5 (24)    Urine (mL/kg/hr)  0 (0)    Other 3536 1651    Total Output 3536 1651    Net +477 8 +2385 5          Unmeasured Stool Occurrence 1 x           Nutrition:        Diet Orders            Start     Ordered    01/26/19 0848  Diet Enteral/Parenteral; Tube Feeding No Oral Diet; Nepro; Continuous; 52  Diet effective now     Question Answer Comment   Diet Type Enteral/Parenteral    Enteral/Parenteral Tube Feeding No Oral Diet    Tube Feeding Formula: Nepro    Bolus/Cyclic/Continuous Continuous    Tube Feeding Goal Rate (mL/hr): 52    RD to adjust diet per protocol? Yes        01/26/19 0848          Labs:     Results from last 7 days  Lab Units 02/02/19  0507 02/01/19  0605 01/31/19  0545 01/30/19  0607  01/27/19  0512   WBC Thousand/uL 18 08* 20 98* 21 04* 26 10*  < > 15 47*   HEMOGLOBIN g/dL 8 7* 10 1* 10 5* 11 1*  < > 11 1*   HEMATOCRIT % 28 3* 32 8* 34 1* 35 6*  < > 35 1*   PLATELETS Thousands/uL 131* 140* 149 131*  < > 46*   NEUTROS PCT %  --   --   --   --   --  82*   MONOS PCT %  --   --   --   --   --  9   MONO PCT %  --  3* 7 1*  < >  --    < > = values in this interval not displayed  Results from last 7 days  Lab Units 02/02/19  0507 02/01/19  1139 02/01/19  0605  01/27/19  0512   POTASSIUM mmol/L 4 3 4 6 4 5  < > 4 1   CHLORIDE mmol/L 102 105 104  < > 107   CO2 mmol/L 24 25 23  < > 22   BUN mg/dL 43* 36* 35*  < > 33*   CREATININE mg/dL 2 61* 2 13* 2 13*  < > 2 54*   CALCIUM mg/dL 8 0* 8 3 8 4  < > 8 3   ALK PHOS U/L  --   --  108  --  90  88   ALT U/L  --   --  19  --  59  64   AST U/L  --   --  80*  --  277*  279*   < > = values in this interval not displayed      Results from last 7 days  Lab Units 02/02/19  0507 02/01/19  1139 02/01/19  0605   MAGNESIUM mg/dL 2 1 2 0 2 1     Lab Results   Component Value Date    PHOS 4 1 02/02/2019    PHOS 3 0 02/01/2019    PHOS 3 2 02/01/2019        Results from last 7 days  Lab Units 02/02/19  0507 02/01/19 2008 02/01/19  1341 01/29/19  1057  01/27/19  0555   INR   --   --   --   --  1 07  --  0 95   PTT seconds 86* 77* 88*  < > 33  < > 28   < > = values in this interval not displayed  0  Lab Value Date/Time   TROPONINI 36 20 (H) 01/24/2019 2137   TROPONINI 36 30 (H) 01/24/2019 1831   TROPONINI 34 90 (H) 01/24/2019 1613   TROPONINI >40 00 (HH) 01/24/2019 0502   TROPONINI >40 00 (HH) 01/24/2019 0207   TROPONINI 36 61 (HH) 01/23/2019 2251   TROPONINI 24 29 (HH) 01/23/2019 1850   TROPONINI 14 20 (HH) 01/23/2019 1600         ABG:  Lab Results   Component Value Date    PHART 7 477 (H) 01/26/2019    CVG2JUB 29 6 (LL) 01/26/2019    PO2ART 71 7 (L) 01/26/2019    JLK4NOQ 21 4 (L) 01/26/2019    BEART -1 1 01/26/2019    SOURCE Line, Arterial 01/26/2019     Imaging: No new imaging,       Micro:  Lab Results   Component Value Date    BLOODCX No Growth After 5 Days  01/27/2019    BLOODCX No Growth After 5 Days  01/27/2019    BLOODCX Staphylococcus aureus (A) 01/26/2019    BLOODCX Staphylococcus aureus (A) 01/26/2019    URINECX 1856-3920 cfu/ml Gram Positive Cocci (A) 01/23/2019    SPUTUMCULTUR 1+ Growth of Staphylococcus aureus (A) 01/24/2019     Allergies:    Allergies   Allergen Reactions    Penicillins Rash     However, has subsequently tolerated Cefazolin and Cefepime     Medications:   Scheduled Meds:    Current Facility-Administered Medications:  acetaminophen 650 mg Oral Q6H PRN Randy Sidhu MD    albuterol 2 5 mg Nebulization Q4H PRN Riley Osuna PA-C    amiodarone 1 mg/min Intravenous Continuous Javier Smith MD Last Rate: 1 mg/min (02/02/19 0012)   bisacodyl 10 mg Rectal Daily PRN Riley Osuna PA-C    cefazolin 1,000 mg Intravenous Q24H Javier Smith MD    chlorhexidine 15 mL Swish & Spit Q12H [address] Riley Osuna PA-C    dexmedetomidine 0 1-0 7 mcg/kg/hr Intravenous Titrated Randy Sidhu MD Last Rate: 0 7 mcg/kg/hr (02/02/19 0507)   fentaNYL 50 mcg/hr Intravenous Titrated Alexys Holloway PA-C Last Rate: 50 mcg/hr (02/01/19 1357)   gabapentin 100 mg Oral HS Kaveh Mejia PA-C    heparin (porcine) 3-30 Units/kg/hr (Order-Specific) Intravenous Titrated Kaveh Mejia PA-C Last Rate: 21 1 Units/kg/hr (02/01/19 2200)   HYDROmorphone 1 mg Intravenous Q3H PRN Lidya Martinez    insulin lispro 5-25 Units Subcutaneous Q6H [address] Sara Blanton PA-C    metoprolol 2 5 mg Intravenous Q6H PRN Aylin Rosado MD    midodrine 10 mg Oral TID AC Fabiana Nguyen PA-C    nystatin  Topical BID Sara Blanton PA-C    oxyCODONE 10 mg Oral Q4H PRN Lidya Martinez    polyethylene glycol 17 g Oral Daily KATHY Mathias    polyvinyl alcohol 1 drop Both Eyes Q3H PRN Fabiana Nguyen PA-C    potassium-sodium phosphateS 2 tablet Oral BID With Meals Aylin Rosado MD    senna 8 8 mg Oral HS Sara Blanton PA-C      Continuous Infusions:    amiodarone 1 mg/min Last Rate: 1 mg/min (02/02/19 0012)   dexmedetomidine 0 1-0 7 mcg/kg/hr Last Rate: 0 7 mcg/kg/hr (02/02/19 0507)   fentaNYL 50 mcg/hr Last Rate: 50 mcg/hr (02/01/19 1357)   heparin (porcine) 3-30 Units/kg/hr (Order-Specific) Last Rate: 21 1 Units/kg/hr (02/01/19 2200)     PRN Meds:    acetaminophen 650 mg Q6H PRN   albuterol 2 5 mg Q4H PRN   bisacodyl 10 mg Daily PRN   HYDROmorphone 1 mg Q3H PRN   metoprolol 2 5 mg Q6H PRN   oxyCODONE 10 mg Q4H PRN   polyvinyl alcohol 1 drop Q3H PRN     VTE Pharmacologic Prophylaxis: Heparin Drip  VTE Mechanical Prophylaxis: sequential compression device  Invasive lines and devices:   Invasive Devices     Central Venous Catheter Line            CVC Central Lines 01/24/19 Triple Left Internal jugular 8 days          Peripheral Intravenous Line            Peripheral IV 01/31/19 Right Antecubital 2 days          Arterial Line            Arterial Line 01/24/19 Right Radial 8 days          Line            Temporary HD Catheter 8 days          Drain            NG/OG/Enteral Tube Orogastric less than 1 day          Airway            ETT  Cuffed 7 5 mm 9 days                     Portions of the record may have been created with voice recognition software  Occasional wrong word or "sound a like" substitutions may have occurred due to the inherent limitations of voice recognition software  Read the chart carefully and recognize, using context, where substitutions have occurred      Anna Mai MD

## 2019-02-02 NOTE — RESPIRATORY THERAPY NOTE
RT Ventilator Management Note  Mervat Galloway 60 y o  male MRN: [id number]  Unit/Bed#: [facility] 11 Encounter: [id number]      Daily Screen       2/2/2019 0931 2/2/2019 1248          Patient safety screen outcome[de-identified] Passed -      Spont breathing trial % for 30 min: Yes -      Spont breathing trial outcome[de-identified] - Failed      Spont breathing trial reason failed: - RR > 35 bpm;Tidal volume < 4ml/Kg of ideal body weight or VE <5 or  >15 LPM      Previous settings resumed: - Yes              Physical Exam:   Assessment Type: (P) Assess only  General Appearance: (P) Awake  Respiratory Pattern: (P) Assisted, Normal  Chest Assessment: (P) Chest expansion symmetrical  Bilateral Breath Sounds: (P) Diminished, Clear  R Breath Sounds: (P) Diminished, Clear  L Breath Sounds: (P) Diminished, Clear  Cough: (P) None  Suction: (P) ET Tube  O2 Device: (P) ventilator      Resp Comments: (P) Pt tolerating current vent settings and appears to be resting comfortably  No vent changes at this time  Will continue to monitor and assess per respiratory protocol

## 2019-02-03 ENCOUNTER — APPOINTMENT (INPATIENT)
Dept: DIALYSIS | Facility: HOSPITAL | Age: 59
DRG: 871 | End: 2019-02-03
Payer: COMMERCIAL

## 2019-02-03 LAB
ANION GAP SERPL CALCULATED.3IONS-SCNC: 9 MMOL/L (ref 4–13)
ANISOCYTOSIS BLD QL SMEAR: PRESENT
APTT PPP: 54 SECONDS (ref 26–38)
APTT PPP: 57 SECONDS (ref 26–38)
APTT PPP: 61 SECONDS (ref 26–38)
BASOPHILS # BLD MANUAL: 0.48 THOUSAND/UL (ref 0–0.1)
BASOPHILS NFR MAR MANUAL: 3 % (ref 0–1)
BUN SERPL-MCNC: 51 MG/DL (ref 5–25)
CALCIUM SERPL-MCNC: 7.9 MG/DL (ref 8.3–10.1)
CHLORIDE SERPL-SCNC: 100 MMOL/L (ref 100–108)
CO2 SERPL-SCNC: 24 MMOL/L (ref 21–32)
CREAT SERPL-MCNC: 3.28 MG/DL (ref 0.6–1.3)
EOSINOPHIL # BLD MANUAL: 0 THOUSAND/UL (ref 0–0.4)
EOSINOPHIL NFR BLD MANUAL: 0 % (ref 0–6)
ERYTHROCYTE [DISTWIDTH] IN BLOOD BY AUTOMATED COUNT: 18.3 % (ref 11.6–15.1)
GFR SERPL CREATININE-BSD FRML MDRD: 20 ML/MIN/1.73SQ M
GLUCOSE SERPL-MCNC: 113 MG/DL (ref 65–140)
GLUCOSE SERPL-MCNC: 118 MG/DL (ref 65–140)
GLUCOSE SERPL-MCNC: 125 MG/DL (ref 65–140)
GLUCOSE SERPL-MCNC: 127 MG/DL (ref 65–140)
GLUCOSE SERPL-MCNC: 133 MG/DL (ref 65–140)
HCT VFR BLD AUTO: 26.7 % (ref 36.5–49.3)
HGB BLD-MCNC: 8.5 G/DL (ref 12–17)
LYMPHOCYTES # BLD AUTO: 0 % (ref 14–44)
LYMPHOCYTES # BLD AUTO: 0 THOUSAND/UL (ref 0.6–4.47)
MCH RBC QN AUTO: 27 PG (ref 26.8–34.3)
MCHC RBC AUTO-ENTMCNC: 31.8 G/DL (ref 31.4–37.4)
MCV RBC AUTO: 85 FL (ref 82–98)
MONOCYTES # BLD AUTO: 0.79 THOUSAND/UL (ref 0–1.22)
MONOCYTES NFR BLD: 5 % (ref 4–12)
MYELOCYTES NFR BLD MANUAL: 1 % (ref 0–1)
NEUTROPHILS # BLD MANUAL: 14.46 THOUSAND/UL (ref 1.85–7.62)
NEUTS BAND NFR BLD MANUAL: 1 % (ref 0–8)
NEUTS SEG NFR BLD AUTO: 90 % (ref 43–75)
NRBC BLD AUTO-RTO: 0 /100 WBCS
PLATELET # BLD AUTO: 131 THOUSANDS/UL (ref 149–390)
PLATELET BLD QL SMEAR: ABNORMAL
PMV BLD AUTO: 12.1 FL (ref 8.9–12.7)
POIKILOCYTOSIS BLD QL SMEAR: PRESENT
POLYCHROMASIA BLD QL SMEAR: PRESENT
POTASSIUM SERPL-SCNC: 4.6 MMOL/L (ref 3.5–5.3)
RBC # BLD AUTO: 3.15 MILLION/UL (ref 3.88–5.62)
RBC MORPH BLD: PRESENT
SODIUM SERPL-SCNC: 133 MMOL/L (ref 136–145)
TARGETS BLD QL SMEAR: PRESENT
WBC # BLD AUTO: 15.89 THOUSAND/UL (ref 4.31–10.16)

## 2019-02-03 PROCEDURE — 82948 REAGENT STRIP/BLOOD GLUCOSE: CPT

## 2019-02-03 PROCEDURE — 94760 N-INVAS EAR/PLS OXIMETRY 1: CPT

## 2019-02-03 PROCEDURE — 99233 SBSQ HOSP IP/OBS HIGH 50: CPT | Performed by: INTERNAL MEDICINE

## 2019-02-03 PROCEDURE — 94003 VENT MGMT INPAT SUBQ DAY: CPT

## 2019-02-03 PROCEDURE — 85730 THROMBOPLASTIN TIME PARTIAL: CPT | Performed by: INTERNAL MEDICINE

## 2019-02-03 PROCEDURE — 99291 CRITICAL CARE FIRST HOUR: CPT | Performed by: INTERNAL MEDICINE

## 2019-02-03 PROCEDURE — 85027 COMPLETE CBC AUTOMATED: CPT | Performed by: INTERNAL MEDICINE

## 2019-02-03 PROCEDURE — 85007 BL SMEAR W/DIFF WBC COUNT: CPT | Performed by: INTERNAL MEDICINE

## 2019-02-03 PROCEDURE — 80048 BASIC METABOLIC PNL TOTAL CA: CPT | Performed by: INTERNAL MEDICINE

## 2019-02-03 RX ORDER — SENNOSIDES 8.8 MG/5ML
17.6 LIQUID ORAL 2 TIMES DAILY
Status: DISCONTINUED | OUTPATIENT
Start: 2019-02-03 | End: 2019-02-07

## 2019-02-03 RX ORDER — MIDODRINE HYDROCHLORIDE 5 MG/1
5 TABLET ORAL
Status: DISCONTINUED | OUTPATIENT
Start: 2019-02-03 | End: 2019-02-08

## 2019-02-03 RX ADMIN — OXYCODONE HYDROCHLORIDE 10 MG: 5 SOLUTION ORAL at 13:06

## 2019-02-03 RX ADMIN — HEPARIN SODIUM AND DEXTROSE 23.1 UNITS/KG/HR: 10000; 5 INJECTION INTRAVENOUS at 14:57

## 2019-02-03 RX ADMIN — DEXMEDETOMIDINE 0.7 MCG/KG/HR: 100 INJECTION, SOLUTION, CONCENTRATE INTRAVENOUS at 01:47

## 2019-02-03 RX ADMIN — OXYCODONE HYDROCHLORIDE 10 MG: 5 SOLUTION ORAL at 09:07

## 2019-02-03 RX ADMIN — CHLORHEXIDINE GLUCONATE 0.12% ORAL RINSE 15 ML: 1.2 LIQUID ORAL at 09:06

## 2019-02-03 RX ADMIN — MIDODRINE HYDROCHLORIDE 10 MG: 5 TABLET ORAL at 06:31

## 2019-02-03 RX ADMIN — NYSTATIN: 100000 POWDER TOPICAL at 09:07

## 2019-02-03 RX ADMIN — DEXMEDETOMIDINE 0.7 MCG/KG/HR: 100 INJECTION, SOLUTION, CONCENTRATE INTRAVENOUS at 06:27

## 2019-02-03 RX ADMIN — GABAPENTIN 100 MG: 100 CAPSULE ORAL at 21:51

## 2019-02-03 RX ADMIN — SENNOSIDES A AND B 17.6 MG: 415.36 LIQUID ORAL at 11:59

## 2019-02-03 RX ADMIN — POLYETHYLENE GLYCOL 3350 17 G: 17 POWDER, FOR SOLUTION ORAL at 09:07

## 2019-02-03 RX ADMIN — DEXMEDETOMIDINE 0.7 MCG/KG/HR: 100 INJECTION, SOLUTION, CONCENTRATE INTRAVENOUS at 09:08

## 2019-02-03 RX ADMIN — DEXMEDETOMIDINE 0.7 MCG/KG/HR: 100 INJECTION, SOLUTION, CONCENTRATE INTRAVENOUS at 04:15

## 2019-02-03 RX ADMIN — NYSTATIN: 100000 POWDER TOPICAL at 20:16

## 2019-02-03 RX ADMIN — DEXMEDETOMIDINE 0.7 MCG/KG/HR: 100 INJECTION, SOLUTION, CONCENTRATE INTRAVENOUS at 14:54

## 2019-02-03 RX ADMIN — HEPARIN SODIUM AND DEXTROSE 21.1 UNITS/KG/HR: 10000; 5 INJECTION INTRAVENOUS at 01:45

## 2019-02-03 RX ADMIN — AMIODARONE HYDROCHLORIDE 200 MG: 200 TABLET ORAL at 09:07

## 2019-02-03 RX ADMIN — DEXMEDETOMIDINE 0.5 MCG/KG/HR: 100 INJECTION, SOLUTION, CONCENTRATE INTRAVENOUS at 23:43

## 2019-02-03 RX ADMIN — SENNOSIDES A AND B 17.6 MG: 415.36 LIQUID ORAL at 18:06

## 2019-02-03 RX ADMIN — DEXMEDETOMIDINE 0.7 MCG/KG/HR: 100 INJECTION, SOLUTION, CONCENTRATE INTRAVENOUS at 12:21

## 2019-02-03 RX ADMIN — MIDODRINE HYDROCHLORIDE 5 MG: 5 TABLET ORAL at 12:08

## 2019-02-03 RX ADMIN — OXYCODONE HYDROCHLORIDE 10 MG: 5 SOLUTION ORAL at 00:25

## 2019-02-03 RX ADMIN — DEXMEDETOMIDINE 0.6 MCG/KG/HR: 100 INJECTION, SOLUTION, CONCENTRATE INTRAVENOUS at 16:34

## 2019-02-03 RX ADMIN — HYDROMORPHONE HYDROCHLORIDE 1 MG: 1 INJECTION, SOLUTION INTRAMUSCULAR; INTRAVENOUS; SUBCUTANEOUS at 20:06

## 2019-02-03 RX ADMIN — DEXMEDETOMIDINE 0.5 MCG/KG/HR: 100 INJECTION, SOLUTION, CONCENTRATE INTRAVENOUS at 20:23

## 2019-02-03 RX ADMIN — CEFAZOLIN SODIUM 1000 MG: 10 INJECTION, POWDER, FOR SOLUTION INTRAVENOUS at 18:06

## 2019-02-03 RX ADMIN — CHLORHEXIDINE GLUCONATE 0.12% ORAL RINSE 15 ML: 1.2 LIQUID ORAL at 20:20

## 2019-02-03 RX ADMIN — MIDODRINE HYDROCHLORIDE 5 MG: 5 TABLET ORAL at 16:32

## 2019-02-03 NOTE — PROGRESS NOTES
Progress Note - Nephrology   Santy Wasserman 60 y o  male MRN: [id number]  [facility] Encounter: [id number]      Assessment / Plan:  1  anuric PATRICK, baseline serum creatinine 0 7 -0 8  -PATRICK most likely secondary to ischemic/septic ATN, rhabdomyolysis  -due to progressive worsening renal failure, concern for mild volume overload, worsening acidosis, patient was started on CRRT on 1/24/19      -off CRRT 2/1, s/p IHD 2/1, 2/2 and will plan for further UF on HD today, 2/3  -Avoid nephrotoxins or NSAIDs  -patient anuric  Monitor for signs of renal recovery  -plan for next HD Monday, 2/4  -transition temporary HD catheter to permacath by IR when able  Consult for IR already ordered      2  Septic shock/component of cardiogenic shock, MSSA bacteremia  -CHON did not show evidence of valvular vegetations    -antibiotic as per ICU team   Now off pressors  BP improved on midodrine       3  Metabolic acidosis/lactic acidosis - resolved on IHD  -likely secondary to worsened renal failure in the setting of septic shock      4  Volume overload  -UF removal as tolerated with HD today  -CHF with EF 30%  [facility] cardiology follow-up  -still anuric     5  History of aortic stenosis, status post bioprosthetic AVR in 2014     6  VDRF s/p intubation - on 40% FIO2     7  Hyponatremia, mild - likely dilutional in setting of volume overload, correct with UF on HD     8  Anemia of critical illness - Hgb stable in 10-->8s, ? Dilutional, continue to monitor  Check CBC prior to HD today       Have discussed with critical care  Plan for IHD today at bedside  Goal UF 2L as tolerated over 2 5hours  Subjective:   He denies any chest pain but motions to have his ET tube removed  He is on 40% FiO2  He is off pressors  He is still anuric  Objective:     Vitals: Blood pressure (!) 106/49, pulse (!) 116, temperature 99 9 °F (37 7 °C), temperature source Oral, resp   rate (!) 29, height 5' 9" (1 753 m), weight (!) 168 kg (370 lb 2 4 oz), SpO2 94 %  ,Body mass index is 54 66 kg/m²  Temp (24hrs), Av 5 °F (37 5 °C), Min:98 9 °F (37 2 °C), Max:99 9 °F (37 7 °C)      Weight (last 2 days)     Date/Time   Weight    19 0541  (!)  168 (370 15)    19 0500  (!)  167 (368 39)                Intake/Output Summary (Last 24 hours) at 19 1101  Last data filed at 19 0800   Gross per 24 hour   Intake          2578 27 ml   Output             2390 ml   Net           188 27 ml     I/O last 24 hours: In: 2880 1 [I V :1634 1; Feedings:1246]  Out: 2390 [Other:2390]        Physical Exam:   Physical Exam   Constitutional: He appears well-developed and well-nourished  No distress  obese   HENT:   Head: Normocephalic and atraumatic  Mouth/Throat: No oropharyngeal exudate  +ETT   Eyes: Right eye exhibits no discharge  Left eye exhibits no discharge  No scleral icterus  Neck: Neck supple  Cardiovascular: Normal rate, regular rhythm and normal heart sounds  Pulmonary/Chest: Effort normal and breath sounds normal  He has no wheezes  He has no rales  Abdominal: Soft  Bowel sounds are normal  He exhibits no distension  There is no tenderness  Musculoskeletal: He exhibits edema  Neurological: He is alert  awake   Skin: Skin is warm and dry  No rash noted  He is not diaphoretic  Psychiatric:   Unable to elicit as patient intubated   Vitals reviewed        Invasive Devices     Central Venous Catheter Line            CVC Central Lines 19 Triple Left Internal jugular 9 days          Peripheral Intravenous Line            Peripheral IV 19 Right Antecubital 3 days          Arterial Line            Arterial Line 19 Right Radial 9 days          Line            Temporary HD Catheter 9 days          Drain            NG/OG/Enteral Tube Orogastric 1 day          Airway            ETT  Cuffed 7 5 mm 10 days                Medications:    Scheduled Meds:  Current Facility-Administered Medications:  acetaminophen 650 mg Oral Q6H PRN Kiet Kim MD    albuterol 2 5 mg Nebulization Q4H PRN Garrick Reaves PA-C    amiodarone 200 mg Oral Daily With Breakfast Garrick Reaves PA-C    bisacodyl 10 mg Rectal Daily PRN Garrick Reaves PA-C    cefazolin 1,000 mg Intravenous Q24H [address], [place] Last Rate: 1,000 mg (02/02/19 1822)   chlorhexidine 15 mL Swish & Spit Q12H [facility] Garrick Reaves PA-C    dexmedetomidine 0 1-0 7 mcg/kg/hr Intravenous Titrated Kiet Kim MD Last Rate: 0 7 mcg/kg/hr (02/03/19 0908)   gabapentin 100 mg Oral HS Vilma Mejia PA-C    heparin (porcine) 3-30 Units/kg/hr (Order-Specific) Intravenous Titrated Janeth Martinez PA-C Last Rate: 23 1 Units/kg/hr (02/03/19 0702)   HYDROmorphone 1 mg Intravenous Q3H PRN Lidya Martinez    insulin lispro 5-25 Units Subcutaneous Q6H [facility] Garrick Reaves PA-C    metoprolol 2 5 mg Intravenous Q6H PRN [address], [place]    midodrine 5 mg Oral TID AC KATHY Palencia    nystatin  Topical BID Garrick Reaves PA-C    oxyCODONE 10 mg Oral Q4H PRN Lidya Martinez    polyethylene glycol 17 g Oral Daily KATHY Mathias    polyvinyl alcohol 1 drop Both Eyes Q3H PRN Ashkan Birmingham PA-C    potassium-sodium phosphateS 2 tablet Oral BID With Meals [address], [place]    senna 17 6 mg Oral BID KATHY Palencia        PRN Meds:   acetaminophen    albuterol    bisacodyl    HYDROmorphone    metoprolol    oxyCODONE    polyvinyl alcohol    Continuous Infusions:  dexmedetomidine 0 1-0 7 mcg/kg/hr Last Rate: 0 7 mcg/kg/hr (02/03/19 0908)   heparin (porcine) 3-30 Units/kg/hr (Order-Specific) Last Rate: 23 1 Units/kg/hr (02/03/19 0702)           LAB RESULTS:        Results from last 7 days  Lab Units 02/03/19  0449 02/02/19  0507 02/01/19  1139 02/01/19  0605 02/01/19  0005 01/31/19  1802 01/31/19  1136 01/31/19  0545  01/30/19  0607  01/29/19  0425  01/28/19  0500   WBC Thousand/uL  --  18 08*  --  20 98*  --   --   --  21 04*  --  26 10*  --  20 70*  -- 16 14*   HEMOGLOBIN g/dL  --  8 7*  --  10 1*  --   --   --  10 5*  --  11 1*  --  10 2*  --  10 7*   HEMATOCRIT %  --  28 3*  --  32 8*  --   --   --  34 1*  --  35 6*  --  32 5*  --  33 2*   PLATELETS Thousands/uL  --  131*  --  140*  --   --   --  149  --  131*  --  87*  --  57*   LYMPHO PCT %  --  4*  --  1*  --   --   --  10*  --  4*  --  1*  --  1*   MONO PCT %  --  4  --  3*  --   --   --  7  --  1*  --  5  --  4   EOS PCT %  --  0  --  0  --   --   --  0  --  0  --  0  --  0   POTASSIUM mmol/L 4 6 4 3 4 6 4 5 4 7 4 6 4 5 4 5  < > 4 4  < >  --   < >  --    CHLORIDE mmol/L 100 102 105 104 105 105 105 106  < > 106  < >  --   < >  --    CO2 mmol/L 24 24 25 23 24 24 25 21  < > 24  < >  --   < >  --    BUN mg/dL 51* 43* 36* 35* 32* 33* 33* 31*  < > 32*  < >  --   < >  --    CREATININE mg/dL 3 28* 2 61* 2 13* 2 13* 2 26* 2 20* 2 25* 2 10*  < > 2 23*  < >  --   < >  --    CALCIUM mg/dL 7 9* 8 0* 8 3 8 4 8 1* 8 3 8 2* 8 5  < > 8 4  < >  --   < >  --    ALK PHOS U/L  --   --   --  108  --   --   --   --   --   --   --   --   --   --    ALT U/L  --   --   --  19  --   --   --   --   --   --   --   --   --   --    AST U/L  --   --   --  80*  --   --   --   --   --   --   --   --   --   --    MAGNESIUM mg/dL  --  2 1 2 0 2 1 2 0 1 9 2 0 2 2  < > 2 2  < >  --   < >  --    PHOSPHORUS mg/dL  --  4 1 3 0 3 2 3 4 3 3 3 1 2 8  < > 2 8  < >  --   < >  --    < > = values in this interval not displayed  CUTURES:  Lab Results   Component Value Date    BLOODCX No Growth After 5 Days  01/27/2019    BLOODCX No Growth After 5 Days  01/27/2019    BLOODCX Staphylococcus aureus (A) 01/26/2019    BLOODCX Staphylococcus aureus (A) 01/26/2019    BLOODCX Staphylococcus aureus (A) 01/24/2019    BLOODCX Staphylococcus aureus (A) 01/24/2019    BLOODCX Staphylococcus aureus (A) 01/23/2019    URINECX 4199-6603 cfu/ml Gram Positive Cocci (A) 01/23/2019                 Portions of the record may have been created with voice recognition software  Occasional wrong word or "sound a like" substitutions may have occurred due to the inherent limitations of voice recognition software  Read the chart carefully and recognize, using context, where substitutions have occurred  If you have any questions, please contact the dictating provider

## 2019-02-03 NOTE — RESPIRATORY THERAPY NOTE
RT Ventilator Management Note  Jenifer Mon 60 y o  male MRN: [id number]  [facility]: [id number]      Daily Screen       2/2/2019 1248 2/3/2019 0921          Patient safety screen outcome[de-identified] - Passed      Spont breathing trial % for 30 min: - Yes      Spont breathing trial outcome[de-identified] Failed Failed      Spont breathing trial reason failed: RR > 35 bpm;Tidal volume < 4ml/Kg of ideal body weight or VE <5 or  >15 LPM RR > 35 bpm;Tidal volume < 4ml/Kg of ideal body weight or VE <5 or  >15 LPM      Previous settings resumed: Yes Yes      Name of Medical Team Notified[de-identified] - rn aware              Physical Exam:   Assessment Type: Assess only  General Appearance: Sedated  Respiratory Pattern: Assisted  Chest Assessment: Chest expansion symmetrical  Bilateral Breath Sounds: Diminished, Clear  R Breath Sounds: Diminished, Clear  L Breath Sounds: Diminished, Clear  Cough: None  Suction: ET Tube  O2 Device: ventilator      Resp Comments: Pt tolerating current vent settings and appeas to be resting comfortably  No vent changes at this time  Will continue to monitor and assess per resp protocol

## 2019-02-03 NOTE — PROGRESS NOTES
Progress Note - Critical Care   Ginny Bellamy 60 y o  male MRN: [id number]  Unit/Bed#: MICU 11 Encounter: [id number]    Attending Physician: Evelyne Lezama, DO  ______________________________________________________________________  Assessment and Plan:   1  Acute hypoxic respiratory failure  2  Severe sepsis  3  MSSA bacteremia  4  Acute on chronic systolic heart failure  5  PATRICK requiring hemodialysis  6  Atrial fibrillation  7  Anemia of critical illness  8  DTI of left foot  9  Type 2 NSTEMI    Neuro: Frequent neurologic monitoring, continue fentanyl/Precedex for ventilatory comfort with PRN oxycodone and Dilaudid    CV: Close hemodynamic monitoring, continue oral amiodarone with PRN metoprolol, gently reduce midodrine, appreciate cardiology recommendations     Pulm: Perform daily spontaneous breathing trial, if unable to liberate may need to begin discussing tracheostomy    GI: Serial abdominal exams, continue bowel regimen    : Close intake and output, daily weights, trend serum creatinine, will reassess need for dialysis today per nephrology, will need to discuss with ID placement of permacath    F/E/N: Continue enteral nutrition, replete electrolytes as needed    ID: Day 12 total of antibiotics, presently on cefazolin, follow temperature/white count, appreciate ID recommendations    Heme: Continue heparin infusion    Endo: Continue sliding scale insulin     Msk/Skin: Frequent turning and repositioning    Disposition: Remain ICU    Code Status: Level 1 - Full Code    Counseling / Coordination of Care  Total Critical Care time spent 33 minutes excluding procedures, teaching and family updates      ______________________________________________________________________    Chief Complaint: Reports shortness of breath secondary to ETT, denies pain    24 Hour Events: Patient tolerated HD yesterday with removal of 2 4 L, transitioned to oral amiodarone  ______________________________________________________________________    Physical Exam:   Gen: Arousable, sedated/intubated  Neuro: GCS 10T E3 V1T M6  HEENT: Atraumatic, mild periorbital edema, PERRL, conjugate gaze, trachea midline, no JVD  CV: Regular rate and rhythm  Pulm: Lung sounds diminished at bases, no inline secretions  GI: Abdomen soft, non-tender, non-distended with bowel sounds present  MSK: 3+ lower extremity, left large toe with DTI  Skin: Warm, dry  ______________________________________________________________________  Vitals:    19 0300 19 0347 19 0400 19 0500   BP:       Pulse: 70  70 72   Resp: (!) 26  (!) 25 22   Temp:       TempSrc:       SpO2: 94% 94% 94% 93%   Weight:       Height:           Temperature:   Temp (24hrs), Av 4 °F (37 4 °C), Min:98 9 °F (37 2 °C), Max:99 9 °F (37 7 °C)    Current Temperature: 99 9 °F (37 7 °C)  Weights:   IBW: 70 7 kg    Body mass index is 54 66 kg/m²    Weight (last 2 days)     Date/Time   Weight    19 0541  (!)  168 (370 15)    19 0500  (!)  167 (368 39)            Hemodynamic Monitoring:  N/A     Non-Invasive/Invasive Ventilation Settings:  Respiratory    Lab Data (Last 4 hours)    None         O2/Vent Data (Last 4 hours)       0347           Vent Mode AC/VC       Resp Rate (BPM) (BPM) 16       Vt (mL) (mL) 450       FIO2 (%) (%) 40       PEEP (cmH2O) (cmH2O) 5       MV 11                 No results found for: PHART, AWX4ONF, PO2ART, ZKS6WUH, F9YUSZKI, BEART, SOURCE  SpO2: SpO2: 94 %, SpO2 Activity: SpO2 Activity: At Rest, SpO2 Device: O2 Device: Other (comment) (ac on vent)  Intake and Outputs:  I/O        07 -  0700  07 -  0700    I V  (mL/kg) 2488 4 (14 8) 1455 6 (8 7)    IV Piggyback 476 1     Feedings 1072 1038    Total Intake(mL/kg) 4036 5 (24) 2493 6 (14 8)    Urine (mL/kg/hr) 0 (0) 0 (0)    Other 1651 2390    Total Output 1651 2390    Net +2385 5 +103 6 Unmeasured Stool Occurrence  1 x          UOP: 0/hour     Nutrition:        Diet Orders            Start     Ordered    01/26/19 0848  Diet Enteral/Parenteral; Tube Feeding No Oral Diet; Nepro; Continuous; 52  Diet effective now     Question Answer Comment   Diet Type Enteral/Parenteral    Enteral/Parenteral Tube Feeding No Oral Diet    Tube Feeding Formula: Nepro    Bolus/Cyclic/Continuous Continuous    Tube Feeding Goal Rate (mL/hr): 52    RD to adjust diet per protocol? Yes        01/26/19 0848          TF currently running at 52/hour with a goal of 52  Formula: Nepro    Labs:     Results from last 7 days  Lab Units 02/02/19  0507 02/01/19  0605 01/31/19  0545   WBC Thousand/uL 18 08* 20 98* 21 04*   HEMOGLOBIN g/dL 8 7* 10 1* 10 5*   HEMATOCRIT % 28 3* 32 8* 34 1*   PLATELETS Thousands/uL 131* 140* 149   MONO PCT % 4 3* 7       Results from last 7 days  Lab Units 02/03/19  0449 02/02/19  0507 02/01/19  1139 02/01/19  0605   POTASSIUM mmol/L 4 6 4 3 4 6 4 5   CHLORIDE mmol/L 100 102 105 104   CO2 mmol/L 24 24 25 23   BUN mg/dL 51* 43* 36* 35*   CREATININE mg/dL 3 28* 2 61* 2 13* 2 13*   CALCIUM mg/dL 7 9* 8 0* 8 3 8 4   ALK PHOS U/L  --   --   --  108   ALT U/L  --   --   --  19   AST U/L  --   --   --  80*       Results from last 7 days  Lab Units 02/02/19  0507 02/01/19  1139 02/01/19  0605   MAGNESIUM mg/dL 2 1 2 0 2 1     Lab Results   Component Value Date    PHOS 4 1 02/02/2019    PHOS 3 0 02/01/2019    PHOS 3 2 02/01/2019        Results from last 7 days  Lab Units 02/03/19 0449 02/02/19  0507 02/01/19 2008 01/29/19  1057   INR   --   --   --   --  1 07   PTT seconds 54* 86* 77*  < > 33   < > = values in this interval not displayed      0  Lab Value Date/Time   TROPONINI 36 20 (H) 01/24/2019 2137   TROPONINI 36 30 (H) 01/24/2019 1831   TROPONINI 34 90 (H) 01/24/2019 1613   TROPONINI >40 00 () 01/24/2019 0502   TROPONINI >40 00 () 01/24/2019 0207   TROPONINI 36 61 () 01/23/2019 2251   TROPONINI 24 29 (HH) 01/23/2019 1850   TROPONINI 14 20 (HH) 01/23/2019 1600         ABG:  Lab Results   Component Value Date    PHART 7 477 (H) 01/26/2019    WEH1WSU 29 6 (LL) 01/26/2019    PO2ART 71 7 (L) 01/26/2019    VXK5TIO 21 4 (L) 01/26/2019    BEART -1 1 01/26/2019    SOURCE Line, Arterial 01/26/2019       Imaging:  I have personally reviewed pertinent reports  EKG: Review of telemetry demonstrates atrial flutter    Micro:  Lab Results   Component Value Date    BLOODCX No Growth After 5 Days  01/27/2019    BLOODCX No Growth After 5 Days  01/27/2019    BLOODCX Staphylococcus aureus (A) 01/26/2019    BLOODCX Staphylococcus aureus (A) 01/26/2019    URINECX 9910-4819 cfu/ml Gram Positive Cocci (A) 01/23/2019    SPUTUMCULTUR 1+ Growth of Staphylococcus aureus (A) 01/24/2019       Allergies:    Allergies   Allergen Reactions    Penicillins Rash     However, has subsequently tolerated Cefazolin and Cefepime       Medications:   Scheduled Meds:  Current Facility-Administered Medications:  acetaminophen 650 mg Oral Q6H PRN Eugenia Cisneros MD    albuterol 2 5 mg Nebulization Q4H PRN Juvencio Teran PA-C    amiodarone 200 mg Oral Daily With Breakfast CECILE Grace-ANA    bisacodyl 10 mg Rectal Daily PRN CECILE Grace-ANA    cefazolin 1,000 mg Intravenous Q24H Tarik Ruffin MD Last Rate: 1,000 mg (02/02/19 1822)   chlorhexidine 15 mL Swish & Spit Q12H [address] Juvencio Teran PA-C    dexmedetomidine 0 1-0 7 mcg/kg/hr Intravenous Titrated Eugenia Cisneros MD Last Rate: 0 7 mcg/kg/hr (02/03/19 2457)   fentaNYL 50 mcg/hr Intravenous Titrated Thermon Tammy PA-C Last Rate: Stopped (02/02/19 1110)   gabapentin 100 mg Oral HS Kourtney Castor Cross, PA-C    heparin (porcine) 3-30 Units/kg/hr (Order-Specific) Intravenous Titrated Kourtney Castor Cross, PA-C Last Rate: 21 1 Units/kg/hr (02/03/19 0145)   HYDROmorphone 1 mg Intravenous Q3H PRN Lidya Martinez    insulin lispro 5-25 Units Subcutaneous Q6H [address] Juvencio Teran PA-C    metoprolol 2 5 mg Intravenous Q6H PRN Olga Gallardo MD    midodrine 10 mg Oral TID AC Myriam Rosa PA-C    nystatin  Topical BID Rivas Negron PA-C    oxyCODONE 10 mg Oral Q4H PRN Lidya Martinez    polyethylene glycol 17 g Oral Daily KATHY Mathias    polyvinyl alcohol 1 drop Both Eyes Q3H PRN Myriam Rosa PA-C    potassium-sodium phosphateS 2 tablet Oral BID With Meals Olga Gallardo MD    senna 8 8 mg Oral HS Rivas Negron PA-C      Continuous Infusions:  dexmedetomidine 0 1-0 7 mcg/kg/hr Last Rate: 0 7 mcg/kg/hr (02/03/19 0627)   fentaNYL 50 mcg/hr Last Rate: Stopped (02/02/19 1110)   heparin (porcine) 3-30 Units/kg/hr (Order-Specific) Last Rate: 21 1 Units/kg/hr (02/03/19 0145)     PRN Meds:    acetaminophen 650 mg Q6H PRN   albuterol 2 5 mg Q4H PRN   bisacodyl 10 mg Daily PRN   HYDROmorphone 1 mg Q3H PRN   metoprolol 2 5 mg Q6H PRN   oxyCODONE 10 mg Q4H PRN   polyvinyl alcohol 1 drop Q3H PRN       VTE Pharmacologic Prophylaxis: Heparin  VTE Mechanical Prophylaxis: sequential compression device    Invasive lines and devices: Invasive Devices     Central Venous Catheter Line            CVC Central Lines 01/24/19 Triple Left Internal jugular 9 days          Peripheral Intravenous Line            Peripheral IV 01/31/19 Right Antecubital 3 days          Arterial Line            Arterial Line 01/24/19 Right Radial 9 days          Line            Temporary HD Catheter 9 days          Drain            NG/OG/Enteral Tube Orogastric 1 day          Airway            ETT  Cuffed 7 5 mm 10 days                     Portions of the record may have been created with voice recognition software  Occasional wrong word or "sound a like" substitutions may have occurred due to the inherent limitations of voice recognition software  Read the chart carefully and recognize, using context, where substitutions have occurred      KATHY Kimble

## 2019-02-03 NOTE — RESPIRATORY THERAPY NOTE
RT Ventilator Management Note  Ginny Bellamy 60 y o  male MRN: [id number]  Unit/Bed#: [facility] 11 Encounter: [id number]      Daily Screen       2/2/2019 1248 2/3/2019 0921          Patient safety screen outcome[de-identified] - Passed      Spont breathing trial % for 30 min: - Yes      Spont breathing trial outcome[de-identified] Failed Failed      Spont breathing trial reason failed: RR > 35 bpm;Tidal volume < 4ml/Kg of ideal body weight or VE <5 or  >15 LPM RR > 35 bpm;Tidal volume < 4ml/Kg of ideal body weight or VE <5 or  >15 LPM      Previous settings resumed: Yes Yes      Name of Medical Team Notified[de-identified] - rn aware              Physical Exam:   Assessment Type: Assess only  General Appearance: Alert, Awake  Respiratory Pattern: Assisted  Chest Assessment: Chest expansion symmetrical  Bilateral Breath Sounds: Diminished  Cough: Non-productive  Suction: ET Tube  O2 Device: vent      Resp Comments: attempted to wean on PSV- RR in high 40's with low VT's  Will try again later in the day  Con't on A/C settings 16/450/40%/+5 as ordered, blanca well, no resp distress, VS stable, will con't to monitor

## 2019-02-03 NOTE — RESPIRATORY THERAPY NOTE
RT Ventilator Management Note  Greta Nolan 60 y o  male MRN: [id number]  Unit/Bed#: [facility] 11 Encounter: [id number]      Daily Screen       2/2/2019 0931 2/2/2019 1248          Patient safety screen outcome[de-identified] Passed -      Spont breathing trial % for 30 min: Yes -      Spont breathing trial outcome[de-identified] - Failed      Spont breathing trial reason failed: - RR > 35 bpm;Tidal volume < 4ml/Kg of ideal body weight or VE <5 or  >15 LPM      Previous settings resumed: - Yes              Physical Exam:   Assessment Type: Assess only  General Appearance: Sleeping  Respiratory Pattern: Assisted  Chest Assessment: Chest expansion symmetrical  Bilateral Breath Sounds: Diminished, Clear  Cough: None  Suction: ET Tube  O2 Device: vent      Resp Comments: con't on a/c settings 16/450/40%/+5 as ordered, blanca well, no resp distress, Vs remain stable, no changes at this time, will con't to monitor

## 2019-02-04 ENCOUNTER — APPOINTMENT (INPATIENT)
Dept: RADIOLOGY | Facility: HOSPITAL | Age: 59
DRG: 871 | End: 2019-02-04
Payer: COMMERCIAL

## 2019-02-04 ENCOUNTER — APPOINTMENT (INPATIENT)
Dept: DIALYSIS | Facility: HOSPITAL | Age: 59
DRG: 871 | End: 2019-02-04
Payer: COMMERCIAL

## 2019-02-04 LAB
ANION GAP SERPL CALCULATED.3IONS-SCNC: 8 MMOL/L (ref 4–13)
ANISOCYTOSIS BLD QL SMEAR: PRESENT
APTT PPP: 48 SECONDS (ref 26–38)
APTT PPP: 58 SECONDS (ref 26–38)
APTT PPP: 71 SECONDS (ref 26–38)
ATRIAL RATE: 143 BPM
BASOPHILS # BLD MANUAL: 0.16 THOUSAND/UL (ref 0–0.1)
BASOPHILS NFR MAR MANUAL: 1 % (ref 0–1)
BUN SERPL-MCNC: 56 MG/DL (ref 5–25)
CA-I BLD-SCNC: 1.05 MMOL/L (ref 1.12–1.32)
CALCIUM SERPL-MCNC: 7.9 MG/DL (ref 8.3–10.1)
CHLORIDE SERPL-SCNC: 99 MMOL/L (ref 100–108)
CO2 SERPL-SCNC: 25 MMOL/L (ref 21–32)
CREAT SERPL-MCNC: 3.94 MG/DL (ref 0.6–1.3)
EOSINOPHIL # BLD MANUAL: 0 THOUSAND/UL (ref 0–0.4)
EOSINOPHIL NFR BLD MANUAL: 0 % (ref 0–6)
ERYTHROCYTE [DISTWIDTH] IN BLOOD BY AUTOMATED COUNT: 18.2 % (ref 11.6–15.1)
GFR SERPL CREATININE-BSD FRML MDRD: 16 ML/MIN/1.73SQ M
GLUCOSE SERPL-MCNC: 109 MG/DL (ref 65–140)
GLUCOSE SERPL-MCNC: 114 MG/DL (ref 65–140)
GLUCOSE SERPL-MCNC: 134 MG/DL (ref 65–140)
GLUCOSE SERPL-MCNC: 156 MG/DL (ref 65–140)
GLUCOSE SERPL-MCNC: 93 MG/DL (ref 65–140)
GLUCOSE SERPL-MCNC: 97 MG/DL (ref 65–140)
HCT VFR BLD AUTO: 26 % (ref 36.5–49.3)
HGB BLD-MCNC: 8.2 G/DL (ref 12–17)
HYPERCHROMIA BLD QL SMEAR: PRESENT
LYMPHOCYTES # BLD AUTO: 1.25 THOUSAND/UL (ref 0.6–4.47)
LYMPHOCYTES # BLD AUTO: 8 % (ref 14–44)
MAGNESIUM SERPL-MCNC: 2.2 MG/DL (ref 1.6–2.6)
MCH RBC QN AUTO: 27.1 PG (ref 26.8–34.3)
MCHC RBC AUTO-ENTMCNC: 31.5 G/DL (ref 31.4–37.4)
MCV RBC AUTO: 86 FL (ref 82–98)
METAMYELOCYTES NFR BLD MANUAL: 1 % (ref 0–1)
MONOCYTES # BLD AUTO: 0.78 THOUSAND/UL (ref 0–1.22)
MONOCYTES NFR BLD: 5 % (ref 4–12)
MYELOCYTES NFR BLD MANUAL: 2 % (ref 0–1)
NEUTROPHILS # BLD MANUAL: 12.98 THOUSAND/UL (ref 1.85–7.62)
NEUTS BAND NFR BLD MANUAL: 1 % (ref 0–8)
NEUTS SEG NFR BLD AUTO: 82 % (ref 43–75)
NRBC BLD AUTO-RTO: 0 /100 WBCS
PHOSPHATE SERPL-MCNC: 5.1 MG/DL (ref 2.7–4.5)
PHOSPHATE SERPL-MCNC: 6.2 MG/DL (ref 2.7–4.5)
PLATELET # BLD AUTO: 140 THOUSANDS/UL (ref 149–390)
PLATELET BLD QL SMEAR: ABNORMAL
PMV BLD AUTO: 12.2 FL (ref 8.9–12.7)
POTASSIUM SERPL-SCNC: 4.7 MMOL/L (ref 3.5–5.3)
QRS AXIS: 66 DEGREES
QRSD INTERVAL: 158 MS
QT INTERVAL: 333 MS
QTC INTERVAL: 514 MS
RBC # BLD AUTO: 3.03 MILLION/UL (ref 3.88–5.62)
RBC MORPH BLD: PRESENT
SODIUM SERPL-SCNC: 132 MMOL/L (ref 136–145)
T WAVE AXIS: 252 DEGREES
VENTRICULAR RATE: 143 BPM
WBC # BLD AUTO: 15.64 THOUSAND/UL (ref 4.31–10.16)

## 2019-02-04 PROCEDURE — 99152 MOD SED SAME PHYS/QHP 5/>YRS: CPT

## 2019-02-04 PROCEDURE — 99232 SBSQ HOSP IP/OBS MODERATE 35: CPT | Performed by: INTERNAL MEDICINE

## 2019-02-04 PROCEDURE — 85027 COMPLETE CBC AUTOMATED: CPT | Performed by: INTERNAL MEDICINE

## 2019-02-04 PROCEDURE — 94760 N-INVAS EAR/PLS OXIMETRY 1: CPT

## 2019-02-04 PROCEDURE — 85007 BL SMEAR W/DIFF WBC COUNT: CPT | Performed by: INTERNAL MEDICINE

## 2019-02-04 PROCEDURE — 84100 ASSAY OF PHOSPHORUS: CPT | Performed by: INTERNAL MEDICINE

## 2019-02-04 PROCEDURE — 36580 REPLACE CVAD CATH: CPT

## 2019-02-04 PROCEDURE — 77001 FLUOROGUIDE FOR VEIN DEVICE: CPT

## 2019-02-04 PROCEDURE — 77001 FLUOROGUIDE FOR VEIN DEVICE: CPT | Performed by: RADIOLOGY

## 2019-02-04 PROCEDURE — 02H633Z INSERTION OF INFUSION DEVICE INTO RIGHT ATRIUM, PERCUTANEOUS APPROACH: ICD-10-PCS | Performed by: RADIOLOGY

## 2019-02-04 PROCEDURE — 93005 ELECTROCARDIOGRAM TRACING: CPT

## 2019-02-04 PROCEDURE — 02HV33Z INSERTION OF INFUSION DEVICE INTO SUPERIOR VENA CAVA, PERCUTANEOUS APPROACH: ICD-10-PCS | Performed by: RADIOLOGY

## 2019-02-04 PROCEDURE — 99152 MOD SED SAME PHYS/QHP 5/>YRS: CPT | Performed by: RADIOLOGY

## 2019-02-04 PROCEDURE — C1751 CATH, INF, PER/CENT/MIDLINE: HCPCS

## 2019-02-04 PROCEDURE — 99153 MOD SED SAME PHYS/QHP EA: CPT

## 2019-02-04 PROCEDURE — 93010 ELECTROCARDIOGRAM REPORT: CPT | Performed by: INTERNAL MEDICINE

## 2019-02-04 PROCEDURE — 36558 INSERT TUNNELED CV CATH: CPT

## 2019-02-04 PROCEDURE — 71045 X-RAY EXAM CHEST 1 VIEW: CPT

## 2019-02-04 PROCEDURE — 5A1D70Z PERFORMANCE OF URINARY FILTRATION, INTERMITTENT, LESS THAN 6 HOURS PER DAY: ICD-10-PCS | Performed by: INTERNAL MEDICINE

## 2019-02-04 PROCEDURE — 76937 US GUIDE VASCULAR ACCESS: CPT | Performed by: RADIOLOGY

## 2019-02-04 PROCEDURE — 82948 REAGENT STRIP/BLOOD GLUCOSE: CPT

## 2019-02-04 PROCEDURE — 83735 ASSAY OF MAGNESIUM: CPT | Performed by: INTERNAL MEDICINE

## 2019-02-04 PROCEDURE — 85730 THROMBOPLASTIN TIME PARTIAL: CPT | Performed by: INTERNAL MEDICINE

## 2019-02-04 PROCEDURE — 84100 ASSAY OF PHOSPHORUS: CPT | Performed by: FAMILY MEDICINE

## 2019-02-04 PROCEDURE — NC001 PR NO CHARGE: Performed by: RADIOLOGY

## 2019-02-04 PROCEDURE — 82330 ASSAY OF CALCIUM: CPT | Performed by: INTERNAL MEDICINE

## 2019-02-04 PROCEDURE — 36558 INSERT TUNNELED CV CATH: CPT | Performed by: RADIOLOGY

## 2019-02-04 PROCEDURE — 36580 REPLACE CVAD CATH: CPT | Performed by: RADIOLOGY

## 2019-02-04 PROCEDURE — C1750 CATH, HEMODIALYSIS,LONG-TERM: HCPCS

## 2019-02-04 PROCEDURE — 94003 VENT MGMT INPAT SUBQ DAY: CPT

## 2019-02-04 PROCEDURE — 99291 CRITICAL CARE FIRST HOUR: CPT | Performed by: INTERNAL MEDICINE

## 2019-02-04 PROCEDURE — 80048 BASIC METABOLIC PNL TOTAL CA: CPT | Performed by: INTERNAL MEDICINE

## 2019-02-04 PROCEDURE — 90935 HEMODIALYSIS ONE EVALUATION: CPT | Performed by: INTERNAL MEDICINE

## 2019-02-04 PROCEDURE — C1894 INTRO/SHEATH, NON-LASER: HCPCS

## 2019-02-04 RX ORDER — LEVALBUTEROL INHALATION SOLUTION 0.63 MG/3ML
0.63 SOLUTION RESPIRATORY (INHALATION) EVERY 6 HOURS
Status: DISCONTINUED | OUTPATIENT
Start: 2019-02-04 | End: 2019-02-04

## 2019-02-04 RX ORDER — METOPROLOL TARTRATE 5 MG/5ML
2.5 INJECTION INTRAVENOUS EVERY 6 HOURS PRN
Status: DISCONTINUED | OUTPATIENT
Start: 2019-02-04 | End: 2019-02-07

## 2019-02-04 RX ORDER — MIDAZOLAM HYDROCHLORIDE 1 MG/ML
INJECTION INTRAMUSCULAR; INTRAVENOUS CODE/TRAUMA/SEDATION MEDICATION
Status: COMPLETED | OUTPATIENT
Start: 2019-02-04 | End: 2019-02-04

## 2019-02-04 RX ORDER — FENTANYL CITRATE 50 UG/ML
INJECTION, SOLUTION INTRAMUSCULAR; INTRAVENOUS CODE/TRAUMA/SEDATION MEDICATION
Status: COMPLETED | OUTPATIENT
Start: 2019-02-04 | End: 2019-02-04

## 2019-02-04 RX ORDER — LORAZEPAM 2 MG/ML
2 INJECTION INTRAMUSCULAR ONCE
Status: COMPLETED | OUTPATIENT
Start: 2019-02-04 | End: 2019-02-04

## 2019-02-04 RX ORDER — PREDNISONE 20 MG/1
40 TABLET ORAL DAILY
Status: COMPLETED | OUTPATIENT
Start: 2019-02-04 | End: 2019-02-08

## 2019-02-04 RX ADMIN — MIDAZOLAM 0.5 MG: 1 INJECTION INTRAMUSCULAR; INTRAVENOUS at 11:43

## 2019-02-04 RX ADMIN — FENTANYL CITRATE 50 MCG: 50 INJECTION INTRAMUSCULAR; INTRAVENOUS at 11:59

## 2019-02-04 RX ADMIN — MIDODRINE HYDROCHLORIDE 5 MG: 5 TABLET ORAL at 17:19

## 2019-02-04 RX ADMIN — NYSTATIN: 100000 POWDER TOPICAL at 17:20

## 2019-02-04 RX ADMIN — MIDAZOLAM 1 MG: 1 INJECTION INTRAMUSCULAR; INTRAVENOUS at 11:31

## 2019-02-04 RX ADMIN — DEXMEDETOMIDINE 0.6 MCG/KG/HR: 100 INJECTION, SOLUTION, CONCENTRATE INTRAVENOUS at 14:39

## 2019-02-04 RX ADMIN — LORAZEPAM 2 MG: 2 INJECTION, SOLUTION INTRAMUSCULAR; INTRAVENOUS at 12:58

## 2019-02-04 RX ADMIN — PREDNISONE 40 MG: 20 TABLET ORAL at 17:19

## 2019-02-04 RX ADMIN — DEXMEDETOMIDINE 0.6 MCG/KG/HR: 100 INJECTION, SOLUTION, CONCENTRATE INTRAVENOUS at 20:35

## 2019-02-04 RX ADMIN — HEPARIN SODIUM AND DEXTROSE 25.1 UNITS/KG/HR: 10000; 5 INJECTION INTRAVENOUS at 02:09

## 2019-02-04 RX ADMIN — DEXMEDETOMIDINE 0.5 MCG/KG/HR: 100 INJECTION, SOLUTION, CONCENTRATE INTRAVENOUS at 03:27

## 2019-02-04 RX ADMIN — MIDAZOLAM 0.5 MG: 1 INJECTION INTRAMUSCULAR; INTRAVENOUS at 11:55

## 2019-02-04 RX ADMIN — HYDROMORPHONE HYDROCHLORIDE 1 MG: 1 INJECTION, SOLUTION INTRAMUSCULAR; INTRAVENOUS; SUBCUTANEOUS at 04:10

## 2019-02-04 RX ADMIN — METOPROLOL TARTRATE 12.5 MG: 25 TABLET ORAL at 08:54

## 2019-02-04 RX ADMIN — DEXMEDETOMIDINE 0.6 MCG/KG/HR: 100 INJECTION, SOLUTION, CONCENTRATE INTRAVENOUS at 13:09

## 2019-02-04 RX ADMIN — NYSTATIN: 100000 POWDER TOPICAL at 08:17

## 2019-02-04 RX ADMIN — MIDAZOLAM 0.5 MG: 1 INJECTION INTRAMUSCULAR; INTRAVENOUS at 12:01

## 2019-02-04 RX ADMIN — MIDODRINE HYDROCHLORIDE 5 MG: 5 TABLET ORAL at 12:58

## 2019-02-04 RX ADMIN — DEXMEDETOMIDINE 0.6 MCG/KG/HR: 100 INJECTION, SOLUTION, CONCENTRATE INTRAVENOUS at 17:21

## 2019-02-04 RX ADMIN — POLYETHYLENE GLYCOL 3350 17 G: 17 POWDER, FOR SOLUTION ORAL at 08:16

## 2019-02-04 RX ADMIN — FENTANYL CITRATE 50 MCG: 50 INJECTION INTRAMUSCULAR; INTRAVENOUS at 11:31

## 2019-02-04 RX ADMIN — OXYCODONE HYDROCHLORIDE 10 MG: 5 SOLUTION ORAL at 08:21

## 2019-02-04 RX ADMIN — CHLORHEXIDINE GLUCONATE 0.12% ORAL RINSE 15 ML: 1.2 LIQUID ORAL at 20:18

## 2019-02-04 RX ADMIN — MIDODRINE HYDROCHLORIDE 5 MG: 5 TABLET ORAL at 06:13

## 2019-02-04 RX ADMIN — DEXMEDETOMIDINE 0.6 MCG/KG/HR: 100 INJECTION, SOLUTION, CONCENTRATE INTRAVENOUS at 23:20

## 2019-02-04 RX ADMIN — AMIODARONE HYDROCHLORIDE 200 MG: 200 TABLET ORAL at 08:16

## 2019-02-04 RX ADMIN — SENNOSIDES A AND B 17.6 MG: 415.36 LIQUID ORAL at 17:20

## 2019-02-04 RX ADMIN — GABAPENTIN 100 MG: 100 CAPSULE ORAL at 21:06

## 2019-02-04 RX ADMIN — CEFAZOLIN SODIUM 1000 MG: 10 INJECTION, POWDER, FOR SOLUTION INTRAVENOUS at 17:19

## 2019-02-04 RX ADMIN — DEXMEDETOMIDINE 0.5 MCG/KG/HR: 100 INJECTION, SOLUTION, CONCENTRATE INTRAVENOUS at 06:50

## 2019-02-04 RX ADMIN — HEPARIN SODIUM AND DEXTROSE 25.1 UNITS/KG/HR: 10000; 5 INJECTION INTRAVENOUS at 14:40

## 2019-02-04 RX ADMIN — METOPROLOL TARTRATE 12.5 MG: 25 TABLET ORAL at 21:06

## 2019-02-04 RX ADMIN — CHLORHEXIDINE GLUCONATE 0.12% ORAL RINSE 15 ML: 1.2 LIQUID ORAL at 08:16

## 2019-02-04 RX ADMIN — DEXMEDETOMIDINE 0.6 MCG/KG/HR: 100 INJECTION, SOLUTION, CONCENTRATE INTRAVENOUS at 10:15

## 2019-02-04 RX ADMIN — HYDROMORPHONE HYDROCHLORIDE 1 MG: 1 INJECTION, SOLUTION INTRAMUSCULAR; INTRAVENOUS; SUBCUTANEOUS at 18:19

## 2019-02-04 RX ADMIN — SENNOSIDES A AND B 17.6 MG: 415.36 LIQUID ORAL at 08:21

## 2019-02-04 NOTE — PROGRESS NOTES
Heart Failure Service Progress Note - Janelle Kim 60 y o  male MRN: [id number]    Unit/Bed#: [facility] 11 Encounter: [id number]      Assessment:    Principal Problem:    Cardiogenic shock ([facility] 75 )  Active Problems:    Increased BMI    NSTEMI (non-ST elevated myocardial infarction) ([facility] )    PATRICK (acute kidney injury) ([facility] 75 )    Stress-induced cardiomyopathy    Acute respiratory failure with hypoxia ([place] [address] )    History of aortic valve replacement with bioprosthetic valve    Atrial fibrillation (HCC)    Septic shock (HCC)    Staphylococcus aureus bacteremia    Transaminitis    Thrombocytopenia (HCC)    Anemia    Leukocytosis      Subjective:   Patient seen and examined  No significant events overnight  Objective: Intake/ Output: 2359/2500 (HD)  Weight:   Tele:     # Mixed shock: Primarily driven by sepsis w/ cardiogenic component  MSSA bacteremia  Off vasopressors  # Hypoxic respiratory failure, Day 11 of intubation    # BiV Systolic HF    Echocardiogram 1/24/19  LVEF: 30%  LVIDd: 6 6 cm  RV: moderately reduced  MR:  PASP:  RVOT:   Other: bioprosthetic AV, mean gradient of 13 mmHg    Neurohormonal Blockade: BP precludes  --Beta-Blocker:  --ACEi, ARB or ARNi:    (or SVR reduction)  --Aldosterone Receptor Blocker:  --Diuretic:    Sudden Cardiac Death Risk Reduction:  --ICD:   Interrogation:     # Bio AVR: CHON without vegetation  # NSTEMI type II  # hypotension, on midodrine  # New onset AFib on long term AC for CVA ppx: amiodarone 200 mg daily  # PATRICK on CVVH  # Shock liver  # anemia of chronic disease    Plan:   this am, paf, flutter  Start metoprolol tartrate 12 5 mg BID- should be able to tolerate    [employer] (day, reason): Turner catheter (day, reason):    Vitals: Blood pressure 111/67, pulse 72, temperature 99 9 °F (37 7 °C), temperature source Oral, resp  rate 20, height 5' 9" (1 753 m), weight (!) 168 kg (371 lb 0 6 oz), SpO2 97 %  , Body mass index is 54 79 kg/m² , I/O last 3 completed shifts:   In: 3394 5 [I V :1959 5; NG/GT:60; IV Piggyback:50; Feedings:1325]  Out: 2500 [Other:2500]  No intake/output data recorded  Wt Readings from Last 3 Encounters:   02/04/19 (!) 168 kg (371 lb 0 6 oz)   01/24/19 (!) 154 kg (339 lb 8 1 oz)   06/11/18 (!) 155 kg (341 lb)       Intake/Output Summary (Last 24 hours) at 02/04/19 0834  Last data filed at 02/04/19 0400   Gross per 24 hour   Intake          2165 44 ml   Output             2500 ml   Net          -334 56 ml     I/O last 3 completed shifts: In: 3394 5 [I V :1959 5; NG/GT:60; IV Piggyback:50; Feedings:1325]  Out: 2500 [Other:2500]    No significant arrhythmias seen on telemetry review         Physical Exam:  Vitals:    02/04/19 0500 02/04/19 0600 02/04/19 0700 02/04/19 0800   BP:  117/59 111/60 111/67   Pulse: 82 98 96 72   Resp: 18 20 21 20   Temp:       TempSrc:       SpO2: 96% 96% 96% 97%   Weight:  (!) 168 kg (371 lb 0 6 oz)     Height:           GEN: Santy Razor intubated  HEENT: pupils equal, round, and reactive to light; extraocular muscles intact  NECK: supple, no carotid bruits   HEART: irregular rhythm, normal S1 and S2, no murmurs, clicks, gallops or rubs, JVP is  Difficult to assess  LUNGS: clear to auscultation bilaterally; no wheezes, rales, or rhonchi   ABDOMEN: morbidly obese normal bowel sounds, soft, no tenderness, no distention  EXTREMITIES: peripheral pulses normal; no clubbing, cyanosis, positive for diffuse edema  NEURO: no focal findings   SKIN: normal without suspicious lesions on exposed skin      Current Facility-Administered Medications:     acetaminophen (TYLENOL) oral suspension 650 mg, 650 mg, Oral, Q6H PRN, Eugenia Cisneros MD, 650 mg at 01/24/19 1946    albuterol inhalation solution 2 5 mg, 2 5 mg, Nebulization, Q4H PRN, Juevncio Teran PA-C    amiodarone tablet 200 mg, 200 mg, Oral, Daily With Breakfast, Juvencio Teran PA-C, 200 mg at 02/04/19 0543    bisacodyl (DULCOLAX) rectal suppository 10 mg, 10 mg, Rectal, Daily PRN, [facility] LOBITO Ellington PA-C    ceFAZolin (ANCEF) 1 g in sodium chloride 0 9% 50 ml IVPB, 1,000 mg, Intravenous, Q24H, Tonya Luna MD, Stopped at 02/03/19 1835    chlorhexidine (PERIDEX) 0 12 % oral rinse 15 mL, 15 mL, Swish & Spit, Q12H [address], HERBERT Estes, 15 mL at 02/04/19 0816    dexmedetomidine (PRECEDEX) 200 mcg in sodium chloride 0 9 % 50 mL infusion, 0 1-0 7 mcg/kg/hr, Intravenous, Titrated, Shirlene Sandoval MD, Last Rate: 18 1 mL/hr at 02/04/19 0650, 0 5 mcg/kg/hr at 02/04/19 0650    gabapentin (NEURONTIN) capsule 100 mg, 100 mg, Oral, HS, Franky Mejia PA-C, 100 mg at 02/03/19 2151    heparin (porcine) 25,000 units in 250 mL infusion (premix), 3-30 Units/kg/hr (Order-Specific), Intravenous, Titrated, Jackie Shaw PA-C, Last Rate: 22 6 mL/hr at 02/04/19 0209, 25 1 Units/kg/hr at 02/04/19 0209    HYDROmorphone (DILAUDID) injection 1 mg, 1 mg, Intravenous, Q3H PRN, Lidya Martinez, 1 mg at 02/04/19 0410    insulin lispro (HumaLOG) 100 units/mL subcutaneous injection 5-25 Units, 5-25 Units, Subcutaneous, Q6H [address], 5 Units at 01/30/19 2355 **AND** Fingerstick Glucose (POCT), , , Q6H, HERBERT Estes    metoprolol (LOPRESSOR) injection 2 5 mg, 2 5 mg, Intravenous, Q6H PRN, Tonya Luna MD, 2 5 mg at 02/01/19 1754    midodrine (PROAMATINE) tablet 5 mg, 5 mg, Oral, TID AC, Jodene Severance, KATHY, 5 mg at 02/04/19 7684    nystatin (MYCOSTATIN) powder, , Topical, BID, Franklyn, PA-C    oxyCODONE (ROXICODONE) oral solution 10 mg, 10 mg, Oral, Q4H PRN, Lidya Martinez, 10 mg at 02/04/19 0821    polyethylene glycol (MIRALAX) packet 17 g, 17 g, Oral, Daily, KATHY Mathias, 17 g at 02/04/19 0816    polyvinyl alcohol (LIQUIFILM TEARS) 1 4 % ophthalmic solution 1 drop, 1 drop, Both Eyes, Q3H PRN, Burt Easley PA-C, 1 drop at 01/31/19 1649    potassium-sodium phosphateS (K-PHOS,PHOSPHA 250) -250 mg tablet 2 tablet, 2 tablet, Oral, BID With Meals, Tonya Luna MD, 2 tablet at 01/31/19 1110    senna 8 8 mg/5 mL oral syrup 17 6 mg, 17 6 mg, Oral, BID, KATHY Kimble, 17 6 mg at 02/04/19 1762      Labs & Results:      Results from last 7 days  Lab Units 02/01/19  0605   CK TOTAL U/L 463*   CK MB INDEX % <1 0     Results from last 7 days  Lab Units 02/04/19  0514 02/03/19  1208 02/02/19  0507   WBC Thousand/uL 15 64* 15 89* 18 08*   HEMOGLOBIN g/dL 8 2* 8 5* 8 7*   HEMATOCRIT % 26 0* 26 7* 28 3*   PLATELETS Thousands/uL 140* 131* 131*           Results from last 7 days  Lab Units 02/04/19  0514 02/03/19  0449 02/02/19  0507  02/01/19  0605   POTASSIUM mmol/L 4 7 4 6 4 3  < > 4 5   CHLORIDE mmol/L 99* 100 102  < > 104   CO2 mmol/L 25 24 24  < > 23   BUN mg/dL 56* 51* 43*  < > 35*   CREATININE mg/dL 3 94* 3 28* 2 61*  < > 2 13*   CALCIUM mg/dL 7 9* 7 9* 8 0*  < > 8 4   ALK PHOS U/L  --   --   --   --  108   ALT U/L  --   --   --   --  19   AST U/L  --   --   --   --  80*   < > = values in this interval not displayed  Results from last 7 days  Lab Units 01/29/19  1057   INR  1 07       Counseling / Coordination of Care  Total floor / unit time spent today 25 minutes  Greater than 50% of total time was spent with the patient and / or family counseling and / or coordination of care  A description of the counseling / coordination of care: 15      Thank you for the opportunity to participate in the care of this patient  Cristin CAUSEY    Director Heart Failure/ Medical Director [address] [place] [address]

## 2019-02-04 NOTE — RESPIRATORY THERAPY NOTE
RT Protocol Note  Juan Alberto Ch 60 y o  male MRN: [id number]  Unit/Bed#: [facility] 11 Encounter: [id number]    Assessment    Principal Problem:    Cardiogenic shock ([facility] )  Active Problems:    Increased BMI    NSTEMI (non-ST elevated myocardial infarction) ([facility] )    PATRICK (acute kidney injury) ([facility] 75 )    Stress-induced cardiomyopathy    Acute respiratory failure with hypoxia ([facility] )    History of aortic valve replacement with bioprosthetic valve    Atrial fibrillation (HCC)    Septic shock (HCC)    Staphylococcus aureus bacteremia    Transaminitis    Thrombocytopenia (HCC)    Anemia    Leukocytosis      Home Pulmonary Medications:  none       Past Medical History:   Diagnosis Date    Allergic rhinitis     last assessed 9/12/12    Finger fracture, right     Closed fx of the middle phalanx of the right 5th finger  last assessed 1/30/14    Hemorrhoids     last assessed 2/10/14    Hyperlipidemia     Hypertension      Social History     Social History    Marital status: /Civil Union     Spouse name: N/A    Number of children: N/A    Years of education: N/A     Social History Main Topics    Smoking status: Never Smoker    Smokeless tobacco: Never Used    Alcohol use No      Comment: ocassion /never drank alcohol per [employer]     Drug use: No    Sexual activity: Not Asked     Other Topics Concern    None     Social History Narrative    None       Subjective         Objective    Physical Exam:   Assessment Type: Assess only  General Appearance: Alert, Awake  Respiratory Pattern: Assisted  Chest Assessment: Chest expansion symmetrical  Bilateral Breath Sounds: Diminished  R Breath Sounds: Coarse  Suction: ET Tube  O2 Device: vent    Vitals:  Blood pressure 117/71, pulse 72, temperature 100 5 °F (38 1 °C), resp  rate 20, height 5' 9" (1 753 m), weight (!) 168 kg (371 lb 0 6 oz), SpO2 94 %  Imaging and other studies: I have personally reviewed pertinent reports        O2 Device: vent     Plan    Respiratory Plan: Vent/NIV/HFNC        Resp Comments: (P) Patient with no pulmonary history and takes no respiratory meds at home  CXR N/A  Patient was previously protocol'd  Bronchodilators will be D/C'd at this time

## 2019-02-04 NOTE — PRE-PROCEDURE INSTRUCTIONS
Treatment terminated after 3:30 hours, high venous pressure, clots noted in venous chamber  Total fluid removed 2673 ml  Dr Hernandez made aware  ICU primary nurse updated

## 2019-02-04 NOTE — PROGRESS NOTES
H and P from admission reviewed  Admitted with multifactorial shock  Still intubated though off pressors  Acute kidney injury for which she has been on hemodialysis with a temporary line  Referred for tunneled hemodialysis catheter placement and left triple-lumen catheter line exchange  Prior imaging was reviewed  Consent obtained from the patient's wife by telephone  All questions answered  Will plan to removed a right non tunneled line and place a right-sided tunneled hemodialysis catheter  Will plan to exchange left triple-lumen over wire under image guidance        Mayito Bliss MD  02/04/19  11:12 AM

## 2019-02-04 NOTE — UTILIZATION REVIEW
Continued Stay Review    Date: 2/3/19   REMAINS IN MICU ON THE VENTILATOR WITH PEEP AFTER PSV TRIAL  DEVELOPED TACHYPNEA AND TACHYCARDIA    Vital Signs:   02/03/19 2100  --   108   23  --  --  130/52  72 mmHg  95 %    02/03/19 2000  100 2 °F (37 9 °C)   112   29  --  --  140/58  88 mmHg  98 %    02/03/19 1900  --  100   23  --  --  158/60  82 mmHg  93 %    02/03/19 1300  --  98   25  --  --  134/56  76 mmHg  94 %    02/03/19 1200  99 9 °F (37 7 °C)  100   25  --  --  132/62  80 mmHg  94 %    02/03/19 1000  --  90   23  --  --  136/64  82 mmHg  95 %    02/03/19 0900  --   116   29  --  --  128/66  88 mmHg  93 %    02/03/19 0800  99 9 °F (37 7 °C)  102   35  --  --  142/64  86 mmHg  92 %    02/03/19 0600  --  96   29  --  --  140/64  84 mmHg  94 %    02/03/19 0400  --  70   25  --  --  124/68  84 mmHg  94 %    02/03/19 0300  --  70   26  --  --  100/58  74 mmHg  94 %    02/03/19 0200  --  70   24  --  --  104/58  74 mmHg  93 %    02/03/19 0000  99 9 °F (37 7 °C)  70   26  --  --  128/66  86 mmHg  94 %      Assessment/Plan:   2/3 Critical Care Progress Note  1  Acute hypoxic respiratory failure currently on mechanical ventilation with volume assist control   Patient had mucus plugs this morning with hypoxia that improved with suctioning   Day# 10 of intubation  2  Severe sepsis/septic shock POA secondary to bacteremia,  off vasopressors  3  MSSA bacteremia POA,  Suspect secondary to MSSA pneumonia POA based on sputum culture from 01/24  4  Status post bioprosthetic AVR with no evidence of agitation on CHON  5  Acute on chronic systolic CHF with EF 39%, still with severe volume overload/anasarca  6  PATRICK, on hemodialysis currently  7  Improved rhabdomyolysis, POA  8  Elevated transaminitis/shock liver  9  Atrial fibrillation with RVR, recent CHON did not show appendage thrombus, on amiodarone  10   left bundle branch block at baseline  11  Improved thrombocytopenia secondary to sepsis  12   Anemia with drop of hemoglobin     P/  · Again I tried patient on pressure support 12/5, he tolerated for brief time but had some tachypnea and tachycardia  Switch back to volume assist control and continue SBT every day  · Continue mild sedation with Precedex  · Decreased midodrine to 5 mg t i d  And monitor blood pressure  · Discussed with Nephrology, patient will need volume removal today either with hemodialysis but if not possible then will start again on CRRT until tomorrow with goal of volume removal more than 150 mL/hour  · Consult IR for PermCath and PICC line placement tomorrow  · Continue antibiotics as per ID, he will need long-term antibiotics  · Continue amiodarone p o   · Continue heparin infusion for now  _______________________  2/3 Nephrology Progress Note  1  anuric PATRICK, baseline serum creatinine 0 7 -0 8  -PATRICK most likely secondary to ischemic/septic ATN, rhabdomyolysis  -due to progressive worsening renal failure, concern for mild volume overload, worsening acidosis, patient was started on CRRT on 1/24/19      -off CRRT 2/1, s/p IHD 2/1, 2/2 and will plan for further UF on HD today, 2/3  -Avoid nephrotoxins or NSAIDs  -patient anuric  Monitor for signs of renal recovery  -plan for next HD Monday, 2/4  -transition temporary HD catheter to permacath by IR when able  Consult for IR already ordered      2  Septic shock/component of cardiogenic shock, MSSA bacteremia  -CHON did not show evidence of valvular vegetations    -antibiotic as per ICU team   Now off pressors  BP improved on midodrine       3   Metabolic acidosis/lactic acidosis - resolved on IHD  -likely secondary to worsened renal failure in the setting of septic shock      Medications:   Scheduled Meds:   Current Facility-Administered Medications:  acetaminophen 650 mg Oral Q6H PRN    albuterol 2 5 mg Nebulization Q4H PRN    amiodarone 200 mg Oral Daily With Breakfast    bisacodyl 10 mg Rectal Daily PRN    cefazolin 1,000 mg Intravenous Q24H Last Rate: Stopped (02/03/19 1835)   chlorhexidine 15 mL Swish & Spit Q12H [address]    dexmedetomidine 0 1-0 7 mcg/kg/hr Intravenous Titrated Last Rate: 0 6 mcg/kg/hr (02/04/19 1309)   gabapentin 100 mg Oral HS    heparin (porcine) 3-30 Units/kg/hr (Order-Specific) Intravenous Titrated Last Rate: Stopped (02/04/19 0856)   HYDROmorphone 1 mg Intravenous Q3H PRN    insulin lispro 5-25 Units Subcutaneous Q6H [address]    metoprolol 2 5 mg Intravenous Q6H PRN    metoprolol tartrate 12 5 mg Oral Q12H ARACELIS    midodrine 5 mg Oral TID AC    nystatin  Topical BID    oxyCODONE 10 mg Oral Q4H PRN    polyethylene glycol 17 g Oral Daily    polyvinyl alcohol 1 drop Both Eyes Q3H PRN    senna 17 6 mg Oral BID      Continuous Infusions:   dexmedetomidine 0 1-0 7 mcg/kg/hr Last Rate: 0 6 mcg/kg/hr (02/04/19 1309)   heparin (porcine) 3-30 Units/kg/hr (Order-Specific) Last Rate: Stopped (02/04/19 0856)     PRN Meds:   acetaminophen    albuterol    bisacodyl    HYDROmorphone x1    metoprolol    oxyCODONE x3    polyvinyl alcohol     Pertinent Labs/Diagnostic Results:   Na 133  BUN/Cr 51/3 28  Denny 7 9  WBC 15 89  H/H 8 5/26 7  Platelets 639  PTT 54, 61    Age/Sex: 60 y o  male     Discharge Plan: D     Network Utilization Review Department  Phone: [phone]; Fax [phone]  [email or web address]  ATTENTION: Please call with any questions or concerns to [phone]  and carefully listen to the prompts so that you are directed to the right person  Send all requests for admission clinical reviews, approved or denied determinations and any other requests to fax [phone]   All voicemails are confidential

## 2019-02-04 NOTE — PLAN OF CARE
Problem: Potential for Falls  Goal: Patient will remain free of falls  INTERVENTIONS:  - Assess patient frequently for physical needs  -  Identify cognitive and physical deficits and behaviors that affect risk of falls    -  [place] fall precautions as indicated by assessment   - Educate patient/family on patient safety including physical limitations  - Instruct patient to call for assistance with activity based on assessment  - Modify environment to reduce risk of injury  - Consider OT/PT consult to assist with strengthening/mobility    Outcome: Progressing      Problem: Prexisting or High Potential for Compromised Skin Integrity  Goal: Skin integrity is maintained or improved  INTERVENTIONS:  - Identify patients at risk for skin breakdown  - Assess and monitor skin integrity  - Assess and monitor nutrition and hydration status  - Monitor labs (i e  albumin)  - Assess for incontinence   - Turn and reposition patient  - Assist with mobility/ambulation  - Relieve pressure over bony prominences  - Avoid friction and shearing  - Provide appropriate hygiene as needed including keeping skin clean and dry  - Evaluate need for skin moisturizer/barrier cream  - Collaborate with interdisciplinary team (i e  Nutrition, Rehabilitation, etc )   - Patient/family teaching   Outcome: Progressing      Problem: SAFETY,RESTRAINT: NV/NON-SELF DESTRUCTIVE BEHAVIOR  Goal: Remains free of harm/injury (restraint for non violent/non self-detsructive behavior)  INTERVENTIONS:  - Instruct patient/family regarding restraint use   - Assess and monitor physiologic and psychological status   - Provide interventions and comfort measures to meet assessed patient needs   - Identify and implement measures to help patient regain control  - Assess readiness for release of restraint    Outcome: Progressing    Goal: Returns to optimal restraint-free functioning  INTERVENTIONS:  - Assess the patient's behavior and symptoms that indicate continued need for restraint  - Identify and implement measures to help patient regain control  - Assess readiness for release of restraint    Outcome: Progressing      Problem: Nutrition/Hydration-ADULT  Goal: Nutrient/Hydration intake appropriate for improving, restoring or maintaining nutritional needs  Monitor and assess patient's nutrition/hydration status for malnutrition (ex- brittle hair, bruises, dry skin, pale skin and conjunctiva, muscle wasting, smooth red tongue, and disorientation)  Collaborate with interdisciplinary team and initiate plan and interventions as ordered  Monitor patient's weight and dietary intake as ordered or per policy  Utilize nutrition screening tool and intervene per policy  Determine patient's food preferences and provide high-protein, high-caloric foods as appropriate       INTERVENTIONS:  - Monitor oral intake, urinary output, labs, and treatment plans  - Assess nutrition and hydration status and recommend course of action  - Evaluate amount of meals eaten  - Assist patient with eating if necessary   - Allow adequate time for meals  - Recommend/ encourage appropriate diets, oral nutritional supplements, and vitamin/mineral supplements  - Order, calculate, and assess calorie counts as needed  - Recommend, monitor, and adjust tube feedings and TPN/PPN based on assessed needs  - Assess need for intravenous fluids  - Provide specific nutrition/hydration education as appropriate  - Include patient/family/caregiver in decisions related to nutrition   Outcome: Progressing      Problem: CARDIOVASCULAR - ADULT  Goal: Maintains optimal cardiac output and hemodynamic stability  INTERVENTIONS:  - Monitor I/O, vital signs and rhythm  - Monitor for S/S and trends of decreased cardiac output i e  bleeding, hypotension  - Administer and titrate ordered vasoactive medications to optimize hemodynamic stability  - Assess quality of pulses, skin color and temperature  - Assess for signs of decreased coronary artery perfusion - ex   Angina  - Instruct patient to report change in severity of symptoms   Outcome: Progressing    Goal: Absence of cardiac dysrhythmias or at baseline rhythm  INTERVENTIONS:  - Continuous cardiac monitoring, monitor vital signs, obtain 12 lead EKG if indicated  - Administer antiarrhythmic and heart rate control medications as ordered  - Monitor electrolytes and administer replacement therapy as ordered   Outcome: Progressing      Problem: METABOLIC, FLUID AND ELECTROLYTES - ADULT  Goal: Electrolytes maintained within normal limits  INTERVENTIONS:  - Monitor labs and assess patient for signs and symptoms of electrolyte imbalances  - Administer electrolyte replacement as ordered  - Monitor response to electrolyte replacements, including repeat lab results as appropriate  - Instruct patient on fluid and nutrition as appropriate   Outcome: Progressing    Goal: Fluid balance maintained  INTERVENTIONS:  - Monitor labs and assess for signs and symptoms of volume excess or deficit  - Monitor I/O and WT  - Instruct patient on fluid and nutrition as appropriate   Outcome: Progressing      Problem: PAIN - ADULT  Goal: Verbalizes/displays adequate comfort level or baseline comfort level  Interventions:  - Encourage patient to monitor pain and request assistance  - Assess pain using appropriate pain scale  - Administer analgesics based on type and severity of pain and evaluate response  - Implement non-pharmacological measures as appropriate and evaluate response  - Consider cultural and social influences on pain and pain management  - Notify physician/advanced practitioner if interventions unsuccessful or patient reports new pain   Outcome: Progressing      Problem: INFECTION - ADULT  Goal: Absence or prevention of progression during hospitalization  INTERVENTIONS:  - Assess and monitor for signs and symptoms of infection  - Monitor lab/diagnostic results  - Monitor all insertion sites, i e  indwelling lines, tubes, and drains  - Monitor endotracheal (as able) and nasal secretions for changes in amount and color  - [place] appropriate cooling/warming therapies per order  - Administer medications as ordered  - Instruct and encourage patient and family to use good hand hygiene technique  - Identify and instruct in appropriate isolation precautions for identified infection/condition    Outcome: Progressing    Goal: Absence of fever/infection during neutropenic period  INTERVENTIONS:  - Monitor WBC  - Implement neutropenic guidelines   Outcome: Progressing      Problem: SAFETY ADULT  Goal: Maintain or return to baseline ADL function  INTERVENTIONS:  -  Assess patient's ability to carry out ADLs; assess patient's baseline for ADL function and identify physical deficits which impact ability to perform ADLs (bathing, care of mouth/teeth, toileting, grooming, dressing, etc )  - Assess/evaluate cause of self-care deficits   - Assess range of motion  - Assess patient's mobility; develop plan if impaired  - Assess patient's need for assistive devices and provide as appropriate  - Encourage maximum independence but intervene and supervise when necessary  ¯ Involve family in performance of ADLs  ¯ Assess for home care needs following discharge   ¯ Request OT consult to assist with ADL evaluation and planning for discharge  ¯ Provide patient education as appropriate    Outcome: Progressing    Goal: Maintain or return mobility status to optimal level  INTERVENTIONS:  - Assess patient's baseline mobility status (ambulation, transfers, stairs, etc )    - Identify cognitive and physical deficits and behaviors that affect mobility  - Identify mobility aids required to assist with transfers and/or ambulation (gait belt, sit-to-stand, lift, walker, cane, etc )  - [place] fall precautions as indicated by assessment  - Record patient progress and toleration of activity level on Mobility SBAR; progress patient to next Phase/Stage  - Instruct patient to call for assistance with activity based on assessment  - Request Rehabilitation consult to assist with strengthening/weightbearing, etc     Outcome: Progressing    Goal: Patient will remain free of falls  INTERVENTIONS:  - Assess patient frequently for physical needs  -  Identify cognitive and physical deficits and behaviors that affect risk of falls  -  [place] fall precautions as indicated by assessment   - Educate patient/family on patient safety including physical limitations  - Instruct patient to call for assistance with activity based on assessment  - Modify environment to reduce risk of injury  - Consider OT/PT consult to assist with strengthening/mobility    Outcome: Progressing      Problem: DISCHARGE PLANNING  Goal: Discharge to home or other facility with appropriate resources  INTERVENTIONS:  - Identify barriers to discharge w/patient and caregiver  - Arrange for needed discharge resources and transportation as appropriate  - Identify discharge learning needs (meds, wound care, etc )  - Arrange for interpretive services to assist at discharge as needed  - Refer to Case Management Department for coordinating discharge planning if the patient needs post-hospital services based on physician/advanced practitioner order or complex needs related to functional status, cognitive ability, or social support system   Outcome: Progressing      Problem: Knowledge Deficit  Goal: Patient/family/caregiver demonstrates understanding of disease process, treatment plan, medications, and discharge instructions  Complete learning assessment and assess knowledge base    Interventions:  - Provide teaching at level of understanding  - Provide teaching via preferred learning methods   Outcome: Progressing      Problem: GENITOURINARY - ADULT  Goal: Maintains or returns to baseline urinary function  INTERVENTIONS:  - Assess urinary function  - Encourage oral fluids to ensure adequate hydration  - Administer IV fluids as ordered to ensure adequate hydration  - Administer ordered medications as needed  - Offer frequent toileting  - Follow urinary retention protocol if ordered   Outcome: Progressing    Goal: Absence of urinary retention  INTERVENTIONS:  - Assess patients ability to void and empty bladder  - Monitor I/O  - Bladder scan as needed  - Discuss with physician/AP medications to alleviate retention as needed  - Discuss catheterization for long term situations as appropriate   Outcome: Progressing      Problem: DISCHARGE PLANNING - CARE MANAGEMENT  Goal: Discharge to post-acute care or home with appropriate resources  INTERVENTIONS:  - Conduct assessment to determine patient/family and health care team treatment goals, and need for post-acute services based on payer coverage, community resources, and patient preferences, and barriers to discharge  - Address psychosocial, clinical, and financial barriers to discharge as identified in assessment in conjunction with the patient/family and health care team  - Arrange appropriate level of post-acute services according to patient's   needs and preference and payer coverage in collaboration with the physician and health care team  - Communicate with and update the patient/family, physician, and health care team regarding progress on the discharge plan  - Arrange appropriate transportation to post-acute venues  - Pt to d/c with appropriate resources when medically stable     Outcome: Progressing

## 2019-02-04 NOTE — PROCEDURES
Central Line Insertion  Date/Time: 2/4/2019 4:59 PM  Performed by: Zari Lezama  Authorized by: Zari Lezama     Patient location:  [facility] protocol:     Procedure explained and questions answered to patient or proxy's satisfaction: yes      Required blood products, implants, devices, and special equipment available: yes      Immediately prior to procedure, a time out was called: yes      Patient identity confirmed:  Verbally with patient, arm band and provided demographic data  Pre-procedure details:     Hand hygiene: Hand hygiene performed prior to insertion      Sterile barrier technique: All elements of maximal sterile technique followed      Skin preparation:  2% chlorhexidine    Skin preparation agent: Skin preparation agent completely dried prior to procedure    Indications:     Central line indications: medications requiring central line    Anesthesia (see MAR for exact dosages): Anesthesia method:  Local infiltration    Local anesthetic:  Lidocaine 1% w/o epi  Procedure details:     Location:  Left internal jugular    Approach: percutaneous technique used      Catheter type:  Triple lumen 20cm    Catheter size:  7 Fr    Successful placement: yes      Vessel of catheter tip end:  SVC  Post-procedure details:     Post-procedure:  Line sutured and dressing applied    Assessment:  Blood return through all ports    Post-procedure complications: none      Patient tolerance of procedure:   Tolerated well, no immediate complications  Comments:      Image guided exchange of existing left IJ central venous line

## 2019-02-04 NOTE — PROCEDURES
Central Line Insertion  Date/Time: 2/4/2019 4:58 PM  Performed by: Bernard Kinsey  Authorized by: Valeria FIELD     Patient location:  IR  Consent:     Consent obtained:  Verbal    Consent given by:  Spouse  Universal protocol:     Procedure explained and questions answered to patient or proxy's satisfaction: yes      Relevant documents present and verified: yes      Immediately prior to procedure, a time out was called: yes      Patient identity confirmed:  Verbally with patient, arm band and provided demographic data  Pre-procedure details:     Hand hygiene: Hand hygiene performed prior to insertion      Sterile barrier technique: All elements of maximal sterile technique followed      Skin preparation:  2% chlorhexidine    Skin preparation agent: Skin preparation agent completely dried prior to procedure    Sedation:     Sedation type: Moderate (conscious) sedation  Anesthesia (see MAR for exact dosages): Anesthesia method:  Local infiltration    Local anesthetic:  Lidocaine 1% WITH epi  Procedure details:     Location:  Right internal jugular    Vessel type: vein      Approach: percutaneous technique used      Catheter type:  Double lumen    Catheter size:  14 Fr    Ultrasound guidance: yes      Sterile ultrasound techniques: Sterile gel and sterile probe covers were used      Successful placement: yes      Vessel of catheter tip end:  RA  Post-procedure details:     Post-procedure:  Dressing applied and line sutured    Assessment:  Blood return through all ports    Post-procedure complications: none      Patient tolerance of procedure:   Tolerated well, no immediate complications  Comments:      32 cm

## 2019-02-04 NOTE — RESPIRATORY THERAPY NOTE
RT Ventilator Management Note  Juan Alberto Ch 60 y o  male MRN: [id number]  Unit/Bed#: [facility] 11 Encounter: [id number]      Daily Screen       2/3/2019 0921 2/4/2019 0832          Patient safety screen outcome[de-identified] Passed Passed      Not Ready for Weaning due to[de-identified] - Going on Transport intubated      Spont breathing trial % for 30 min: Yes -      Spont breathing trial outcome[de-identified] Failed -      Spont breathing trial reason failed: RR > 35 bpm;Tidal volume < 4ml/Kg of ideal body weight or VE <5 or  >15 LPM -      Previous settings resumed: Yes -      Name of Medical Team Notified[de-identified] rn aware -              Physical Exam:   Assessment Type: Assess only  General Appearance: Alert, Awake  Respiratory Pattern: Assisted  Chest Assessment: Chest expansion symmetrical  Bilateral Breath Sounds: Diminished  R Breath Sounds: Coarse  Suction: ET Tube  O2 Device: vent      Resp Comments: pt remains on ventilator at this time on ac/vc settings no changes made will continue to monitor

## 2019-02-04 NOTE — RESPIRATORY THERAPY NOTE
RT Ventilator Management Note  Wilberto Suggs 60 y o  male MRN: [id number]  Unit/Bed#: [facility] 11 Encounter: [id number]      Daily Screen       2/3/2019 0921 2/4/2019 0832          Patient safety screen outcome[de-identified] Passed Passed      Not Ready for Weaning due to[de-identified] - Going on Transport intubated      Spont breathing trial % for 30 min: Yes -      Spont breathing trial outcome[de-identified] Failed -      Spont breathing trial reason failed: RR > 35 bpm;Tidal volume < 4ml/Kg of ideal body weight or VE <5 or  >15 LPM -      Previous settings resumed: Yes -      Name of Medical Team Notified[de-identified] rn aware -              Physical Exam:   Assessment Type: Assess only  General Appearance: Alert, Awake  Respiratory Pattern: Assisted  Chest Assessment: Chest expansion symmetrical  Bilateral Breath Sounds: Diminished  R Breath Sounds: Coarse  L Breath Sounds: Diminished, Clear  Cough: None  Suction: ET Tube  O2 Device: vent      Resp Comments: Patient remains on AC/VC with RR in high 20s-30s  Patient is going for procedure today so weaning was not attempted at this time

## 2019-02-04 NOTE — PROGRESS NOTES
NEPHROLOGY PROGRESS NOTE    Marielena Sanches 60 y o  male MRN: [id number]  [facility] Encounter: [id number]  Reason for Consult:  Acute renal failure    The patient presently is intubated but awake and alert and nods appropriately to questioning  Other than that patient is off pressor support  Remains on ventilator  ASSESSMENT/PLAN:  1  Renal  The patient has acute renal failure due to ATN with baseline normal renal function  He has been undergoing hemodialysis since he has been taken off continuous renal replacement therapy  He also underwent extra ultrafiltration yesterday  At this point will plan for dialysis today and continue  treatment outline below  Patient will be given a PermCath in interventional radiology  No significant urine output recorded so no signs of recovery at the present time  HEMODIALYSIS PROCEDURE NOTE  The patient was seen and examined on hemodialysis  Time: 4 hours  Sodium: 138 Blood flow: 400   Dialyzer: F160 Potassium: 3 Dialysate flow: 600   Access: catheter Bicarbonate: 30 Ultrafiltration goal: 3          SUBJECTIVE:  ROS  Patient will nod when asked some questions but unable to get complete review of systems due to his current clinical state  OBJECTIVE:  Current Weight: Weight - Scale: (!) 168 kg (371 lb 0 6 oz)  Vitals:Temp (24hrs), Av 1 °F (37 8 °C), Min:99 8 °F (37 7 °C), Max:100 5 °F (38 1 °C)  Current: Temperature: 100 5 °F (38 1 °C)   Blood pressure 127/57, pulse (!) 106, temperature 100 5 °F (38 1 °C), resp  rate 22, height 5' 9" (1 753 m), weight (!) 168 kg (371 lb 0 6 oz), SpO2 95 %  , Body mass index is 54 79 kg/m²        Intake/Output Summary (Last 24 hours) at 19 0926  Last data filed at 19 0400   Gross per 24 hour   Intake          2165 44 ml   Output             2500 ml   Net          -334 56 ml       Physical Exam: /57   Pulse (!) 106   Temp 100 5 °F (38 1 °C)   Resp 22   Ht 5' 9" (1 753 m)   Wt (!) 168 kg (371 lb 0 6 oz)   SpO2 95%   BMI 54 79 kg/m²   Physical Exam   Constitutional: No distress  HENT:   Intubated  Neck: No JVD present  Cardiovascular: Normal rate  Exam reveals no friction rub  Irregular rhythm  Pulmonary/Chest: No respiratory distress  He has no rales  Abdominal: Soft  He exhibits distension  There is no tenderness         Medications:    Current Facility-Administered Medications:     acetaminophen (TYLENOL) oral suspension 650 mg, 650 mg, Oral, Q6H PRN, Loreta Curry MD, 650 mg at 01/24/19 1946    albuterol inhalation solution 2 5 mg, 2 5 mg, Nebulization, Q4H PRN, Tatianna Dubon PA-C    amiodarone tablet 200 mg, 200 mg, Oral, Daily With Breakfast, Tatianna Dubon PA-C, 200 mg at 02/04/19 0494    bisacodyl (DULCOLAX) rectal suppository 10 mg, 10 mg, Rectal, Daily PRN, Tatianna Dubon PA-C    ceFAZolin (ANCEF) 1 g in sodium chloride 0 9% 50 ml IVPB, 1,000 mg, Intravenous, Q24H, Lorena Ryan MD, Stopped at 02/03/19 1835    chlorhexidine (PERIDEX) 0 12 % oral rinse 15 mL, 15 mL, Swish & Spit, Q12H [address], Tatianna Dubon PA-C, 15 mL at 02/04/19 0816    dexmedetomidine (PRECEDEX) 200 mcg in sodium chloride 0 9 % 50 mL infusion, 0 1-0 7 mcg/kg/hr, Intravenous, Titrated, Loreta Curry MD, Last Rate: 21 8 mL/hr at 02/04/19 0856, 0 6 mcg/kg/hr at 02/04/19 0856    gabapentin (NEURONTIN) capsule 100 mg, 100 mg, Oral, HS, Jazzy Mejia PA-C, 100 mg at 02/03/19 2151    heparin (porcine) 25,000 units in 250 mL infusion (premix), 3-30 Units/kg/hr (Order-Specific), Intravenous, Titrated, Gm Oh PA-C, Stopped at 02/04/19 5173    HYDROmorphone (DILAUDID) injection 1 mg, 1 mg, Intravenous, Q3H PRN, Lidya Martinez, 1 mg at 02/04/19 0410    insulin lispro (HumaLOG) 100 units/mL subcutaneous injection 5-25 Units, 5-25 Units, Subcutaneous, Q6H [address], 5 Units at 01/30/19 7005 **AND** Fingerstick Glucose (POCT), , , Q6H, Tatianna Dubon PA-C    metoprolol (LOPRESSOR) injection 2 5 mg, 2 5 mg, Intravenous, Q6H PRN, Michael Kendrick MD, 2 5 mg at 02/01/19 1754    metoprolol tartrate (LOPRESSOR) partial tablet 12 5 mg, 12 5 mg, Oral, Q12H [address], Clement Dach, DO, 12 5 mg at 02/04/19 0854    midodrine (PROAMATINE) tablet 5 mg, 5 mg, Oral, TID AC, KATHY Amado, 5 mg at 02/04/19 9862    nystatin (MYCOSTATIN) powder, , Topical, BID, Alexandra Kenney PA-C    oxyCODONE (ROXICODONE) oral solution 10 mg, 10 mg, Oral, Q4H PRN, Lidya Martinez, 10 mg at 02/04/19 0821    polyethylene glycol (MIRALAX) packet 17 g, 17 g, Oral, Daily, KATHY Mathias, 17 g at 02/04/19 0816    polyvinyl alcohol (LIQUIFILM TEARS) 1 4 % ophthalmic solution 1 drop, 1 drop, Both Eyes, Q3H PRN, Janalyn Oppenheim, PA-C, 1 drop at 01/31/19 1649    potassium-sodium phosphateS (K-PHOS,PHOSPHA 250) -250 mg tablet 2 tablet, 2 tablet, Oral, BID With Meals, Michael Kendrick MD, 2 tablet at 01/31/19 1110    senna 8 8 mg/5 mL oral syrup 17 6 mg, 17 6 mg, Oral, BID, KATHY Amado, 17 6 mg at 02/04/19 8941    Laboratory Results:  Lab Results   Component Value Date    WBC 15 64 (H) 02/04/2019    HGB 8 2 (L) 02/04/2019    HCT 26 0 (L) 02/04/2019    MCV 86 02/04/2019     (L) 02/04/2019     Lab Results   Component Value Date    GLUCOSE 92 04/09/2015    CALCIUM 7 9 (L) 02/04/2019     04/09/2015    K 4 7 02/04/2019    CO2 25 02/04/2019    CL 99 (L) 02/04/2019    BUN 56 (H) 02/04/2019    CREATININE 3 94 (H) 02/04/2019     Lab Results   Component Value Date    CALCIUM 7 9 (L) 02/04/2019    PHOS 6 2 (H) 02/04/2019     No results found for: LABPROT

## 2019-02-04 NOTE — PROGRESS NOTES
Progress Note - Critical Care   Deepak Reeves 60 y o  male MRN: [id number]  Unit/Bed#: [facility] 11 Encounter: [id number]    Attending Physician: Daniel Mahoney DO      ______________________________________________________________________  Assessment and Plan:   Principal Problem:    Cardiogenic shock ([facility] [address] )  Active Problems:    Increased BMI    NSTEMI (non-ST elevated myocardial infarction) ([email or web address] )    PATRICK (acute kidney injury) ([email or web address] )    Stress-induced cardiomyopathy    Acute respiratory failure with hypoxia ([facility] )    History of aortic valve replacement with bioprosthetic valve    Atrial fibrillation ([email or web address] )    Septic shock (HCC)    Staphylococcus aureus bacteremia    Transaminitis    Thrombocytopenia (HCC)    Anemia    Leukocytosis  Resolved Problems:    Acute encephalopathy    Rhabdomyolysis    60year-old, PMHx of  HTN, bioprosthetic valve admitted with shock of multifactorial etiology with PATRICK and newly diagnosed AFib with RVR     Neuro:   · Patient is alert, following commands today  · Sedation & Analgesia: Precedex  · Neuro checks as per unit protocol  · Take precautions to prevent ICU delirium   Monitor GCS     Peripheral Neuropathy  · Gabapentin 100 daily     Conjunctivitis  · Artificial tears     CV:   Shock, likely multifactorial,off pressors  Midodrine decreased 5 mg 3x daily  Continue to monitor with MAP goal >65      New onset A fib with RVR   - Amiodarone oral   - A fib with HR is the mid 80 to 100, with intermittent HR in the 108-116  - Metoprolol 12 5 mg bid , per HF team  - On Heparin drip for [facility]  - per Cardiology, consider a repeat TTE when clinically improved  - continue tele monitoring  - Echo 1/25: EF 30% with moderate diffuse hypokinesis, no evidence of vegetations  - History of Bioprosthetic AoVR     NSTEMI type 2 2/2 shock   Continuous telemetry      Pulm:   Acute hypoxic respiratory failure  - Intubated on vent settings 16/450/40%/5  - SPO2 goal > 92%   -will try to wean off ventilator as tolerated  -monitor secretions        GI:   Shock liver vs hepatic congestion  · GI ppx: not indicated, pt on TF  · Bowel regimen: Miralax, senna     :   PATRICK likely 2/2 ischemia vs Septic ATN vs Rhabdo  · CRRT dc'd transitioned to IHD 02/01  · UOP -anuric   · Monitor I&Os, net + 2 3L over past 24H and  + 1 4 L since admission  · Cr stable at 3 9 (BL 0 7-0 8), GFR 16  · CK improved markedly, will stop trending  · PermaCath today by IR  · Nephrology following         F/E/N:  1  Fluids/Electrolytes  · Will monitor  Electrolytes and Replete as needed       2   Nutrition: TF Nepro at goal of 52        ID:   MSSA bacteremia , likely 2/2 MSSA PNA in the setting of positive Sputum Culture  WBC gradually improving  Bcx 1/27 negative so far  CHON negative for vegetation, EF  30%  Day # 11 of Ancef, renally dosed ,will treat for at least 6 weeks, per ID  Trend fever curve and WBC   Per ID, may consider spine imaging         Heme:   Thrombocytopenia likely consumptive, improving  Hbg- stable at 8 2, likely dilutional, pt has no acute bleeding, will continue to trend CBC  tranfuse as needed with goal >7  DVT ppx: On heparin drip for AC, PTT- 71, at therapeutic goal   Continue SCDs     Endo:   Blood sugars well controlled, 109-134 over the last 24 hrs  Goal of 140-180  Will continue to monitor  SSI     · Msk/Skin:  · Podiatry: trimmed toe nails and recommends strict off loading of heels to bed  Recommends reconsulting if DTI to L hallux worsens  · Turn/position 2 hourly  · Wound care   · PT/OT when appropriate            Disposition: Continued ICU stay    Code Status: Level 1 - Full Code    Counseling / Coordination of Care  Total Critical Care time spent 30 minutes excluding procedures, teaching and family updates      ______________________________________________________________________    Chief Complaint: Intubated    24 Hour Events:   No overnight events    ______________________________________________________________________    Physical Exam:   Physical Exam   HENT:   Head: Normocephalic and atraumatic  Eyes: EOM are normal    Cardiovascular:   Irregular irregular   Pulmonary/Chest:   Decreased BS   Abdominal: Bowel sounds are normal    Neurological: He is alert  GCS-11   Skin: Skin is warm and dry          ______________________________________________________________________  Vitals:    19 0410 19 0436 19 0500 19 0600   BP:    117/59   Pulse: 86 92 82 98   Resp: 21 18 18 20   Temp:       TempSrc:       SpO2: 96% 96% 96% 96%   Weight:    (!) 168 kg (371 lb 0 6 oz)   Height:           Temperature:   Temp (24hrs), Av °F (37 8 °C), Min:99 8 °F (37 7 °C), Max:100 2 °F (37 9 °C)    Current Temperature: 99 9 °F (37 7 °C)  Weights:   IBW: 70 7 kg    Body mass index is 54 79 kg/m²    Weight (last 2 days)     Date/Time   Weight    19 0600  (!)  168 (371 03)    19 0541  (!)  168 (370 15)            Hemodynamic Monitoring:  N/A     Non-Invasive/Invasive Ventilation Settings:  Respiratory    Lab Data (Last 4 hours)    None         O2/Vent Data (Last 4 hours)       0350           Vent Mode AC/VC       Resp Rate (BPM) (BPM) 16       Vt (mL) (mL) 450       FIO2 (%) (%) 40       PEEP (cmH2O) (cmH2O) 5       MV 10 6                 No results found for: PHART, OGV9IJW, PO2ART, QJH2ENP, N9ZEXDNU, BEART, SOURCE    Intake and Outputs:  I/O        07 -  0700  07 -  0700    I V  (mL/kg) 1544 (9 2) 1444 6 (8 6)    NG/GT  60    IV Piggyback  50    Feedings 1142 805    Total Intake(mL/kg) 2686 (16) 2359 6 (14)    Urine (mL/kg/hr) 0 (0)     Other 2390 2500    Total Output 2390 2500    Net +296 -140 4          Unmeasured Stool Occurrence 1 x           Nutrition:        Diet Orders            Start     Ordered    19 0848  Diet Enteral/Parenteral; Tube Feeding No Oral Diet; Nepro; Continuous; 52  Diet effective now     Question Answer Comment   Diet Type Enteral/Parenteral    Enteral/Parenteral Tube Feeding No Oral Diet    Tube Feeding Formula: Nepro    Bolus/Cyclic/Continuous Continuous    Tube Feeding Goal Rate (mL/hr): 52    RD to adjust diet per protocol? Yes        01/26/19 0848          Labs:     Results from last 7 days  Lab Units 02/04/19  0514 02/03/19  1208 02/02/19  0507 02/01/19  0605   WBC Thousand/uL 15 64* 15 89* 18 08* 20 98*   HEMOGLOBIN g/dL 8 2* 8 5* 8 7* 10 1*   HEMATOCRIT % 26 0* 26 7* 28 3* 32 8*   PLATELETS Thousands/uL 140* 131* 131* 140*   MONO PCT %  --  5 4 3*       Results from last 7 days  Lab Units 02/04/19  0514 02/03/19  0449 02/02/19  0507  02/01/19  0605   POTASSIUM mmol/L 4 7 4 6 4 3  < > 4 5   CHLORIDE mmol/L 99* 100 102  < > 104   CO2 mmol/L 25 24 24  < > 23   BUN mg/dL 56* 51* 43*  < > 35*   CREATININE mg/dL 3 94* 3 28* 2 61*  < > 2 13*   CALCIUM mg/dL 7 9* 7 9* 8 0*  < > 8 4   ALK PHOS U/L  --   --   --   --  108   ALT U/L  --   --   --   --  19   AST U/L  --   --   --   --  80*   < > = values in this interval not displayed  Results from last 7 days  Lab Units 02/04/19  0514 02/02/19  0507 02/01/19  1139   MAGNESIUM mg/dL 2 2 2 1 2 0     Lab Results   Component Value Date    PHOS 6 2 (H) 02/04/2019    PHOS 4 1 02/02/2019    PHOS 3 0 02/01/2019        Results from last 7 days  Lab Units 02/04/19  0211 02/03/19  1916 02/03/19  1208  01/29/19  1057   INR   --   --   --   --  1 07   PTT seconds 71* 57* 61*  < > 33   < > = values in this interval not displayed      0  Lab Value Date/Time   TROPONINI 36 20 (H) 01/24/2019 2137   TROPONINI 36 30 (H) 01/24/2019 1831   TROPONINI 34 90 (H) 01/24/2019 1613   TROPONINI >40 00 (HH) 01/24/2019 0502   TROPONINI >40 00 (HH) 01/24/2019 0207   TROPONINI 36 61 () 01/23/2019 2251   TROPONINI 24 29 (HH) 01/23/2019 1850   TROPONINI 14 20 () 01/23/2019 1600         ABG:  Lab Results   Component Value Date    PHART 7 477 (H) 01/26/2019 [id number] 29 6 (LL) 01/26/2019    PO2ART 71 7 (L) 01/26/2019    [id number] 21 4 (L) 01/26/2019    BEART -1 1 01/26/2019    SOURCE Line, Arterial 01/26/2019     Imaging:No new imaging    Micro:  Lab Results   Component Value Date    BLOODCX No Growth After 5 Days  01/27/2019    BLOODCX No Growth After 5 Days  01/27/2019    BLOODCX Staphylococcus aureus (A) 01/26/2019    BLOODCX Staphylococcus aureus (A) 01/26/2019    URINECX 8174-3622 cfu/ml Gram Positive Cocci (A) 01/23/2019    SPUTUMCULTUR 1+ Growth of Staphylococcus aureus (A) 01/24/2019     Allergies:    Allergies   Allergen Reactions    Penicillins Rash     However, has subsequently tolerated Cefazolin and Cefepime     Medications:   Scheduled Meds:  Current Facility-Administered Medications:  acetaminophen 650 mg Oral Q6H PRN Cristofer Powell MD    albuterol 2 5 mg Nebulization Q4H PRN HERBERT Estes    amiodarone 200 mg Oral Daily With Breakfast FranklynHERBERT moscoso    bisacodyl 10 mg Rectal Daily PRN HERBERT Estes    cefazolin 1,000 mg Intravenous Q24H Shemar Lobo MD Last Rate: Stopped (02/03/19 1835)   chlorhexidine 15 mL Swish & Spit Q12H [address] FranklynHERBERT moscoso    dexmedetomidine 0 1-0 7 mcg/kg/hr Intravenous Titrated Cristofer Powell MD Last Rate: 0 5 mcg/kg/hr (02/04/19 0327)   gabapentin 100 mg Oral HS Dotson Kenji HERBERT Mejia    heparin (porcine) 3-30 Units/kg/hr (Order-Specific) Intravenous Titrated Elayne Mei PA-C Last Rate: 25 1 Units/kg/hr (02/04/19 0209)   HYDROmorphone 1 mg Intravenous Q3H PRN Lidya Martinez    insulin lispro 5-25 Units Subcutaneous Q6H [address] FranklynHERBERT moscoso    metoprolol 2 5 mg Intravenous Q6H PRN Shemar oLbo MD    midodrine 5 mg Oral TID AC Colan Second, CRNP    nystatin  Topical BID FranklynCECILE moscoso-ANA    oxyCODONE 10 mg Oral Q4H PRN Lidya Martinez    polyethylene glycol 17 g Oral Daily KATHY Mathias    polyvinyl alcohol 1 drop Both Eyes Q3H PRN Petar Rivero PA-C potassium-sodium phosphateS 2 tablet Oral BID With Meals Michael Kendrick MD    senna 17 6 mg Oral BID Puentes KATHY Sawyer      Continuous Infusions:  dexmedetomidine 0 1-0 7 mcg/kg/hr Last Rate: 0 5 mcg/kg/hr (02/04/19 0327)   heparin (porcine) 3-30 Units/kg/hr (Order-Specific) Last Rate: 25 1 Units/kg/hr (02/04/19 0209)     PRN Meds:    acetaminophen 650 mg Q6H PRN   albuterol 2 5 mg Q4H PRN   bisacodyl 10 mg Daily PRN   HYDROmorphone 1 mg Q3H PRN   metoprolol 2 5 mg Q6H PRN   oxyCODONE 10 mg Q4H PRN   polyvinyl alcohol 1 drop Q3H PRN     VTE Pharmacologic Prophylaxis: Heparin Drip  VTE Mechanical Prophylaxis: sequential compression device  Invasive lines and devices: Invasive Devices     Central Venous Catheter Line            CVC Central Lines 01/24/19 Triple Left Internal jugular 10 days          Peripheral Intravenous Line            Peripheral IV 01/31/19 Right Antecubital 4 days          Line            Temporary HD Catheter 10 days          Drain            NG/OG/Enteral Tube Orogastric 2 days          Airway            ETT  Cuffed 7 5 mm 11 days                     Portions of the record may have been created with voice recognition software  Occasional wrong word or "sound a like" substitutions may have occurred due to the inherent limitations of voice recognition software  Read the chart carefully and recognize, using context, where substitutions have occurred      Michael Kendrick MD

## 2019-02-05 LAB
ANION GAP SERPL CALCULATED.3IONS-SCNC: 8 MMOL/L (ref 4–13)
APTT PPP: 73 SECONDS (ref 26–38)
APTT PPP: 77 SECONDS (ref 26–38)
ATRIAL RATE: 72 BPM
BASOPHILS # BLD MANUAL: 0 THOUSAND/UL (ref 0–0.1)
BASOPHILS NFR MAR MANUAL: 0 % (ref 0–1)
BUN SERPL-MCNC: 58 MG/DL (ref 5–25)
CA-I BLD-SCNC: 1.05 MMOL/L (ref 1.12–1.32)
CALCIUM SERPL-MCNC: 7.8 MG/DL (ref 8.3–10.1)
CHLORIDE SERPL-SCNC: 100 MMOL/L (ref 100–108)
CO2 SERPL-SCNC: 25 MMOL/L (ref 21–32)
CREAT SERPL-MCNC: 4.06 MG/DL (ref 0.6–1.3)
EOSINOPHIL # BLD MANUAL: 0 THOUSAND/UL (ref 0–0.4)
EOSINOPHIL NFR BLD MANUAL: 0 % (ref 0–6)
ERYTHROCYTE [DISTWIDTH] IN BLOOD BY AUTOMATED COUNT: 18 % (ref 11.6–15.1)
GFR SERPL CREATININE-BSD FRML MDRD: 15 ML/MIN/1.73SQ M
GIANT PLATELETS BLD QL SMEAR: PRESENT
GLUCOSE SERPL-MCNC: 141 MG/DL (ref 65–140)
GLUCOSE SERPL-MCNC: 143 MG/DL (ref 65–140)
GLUCOSE SERPL-MCNC: 170 MG/DL (ref 65–140)
GLUCOSE SERPL-MCNC: 181 MG/DL (ref 65–140)
HCT VFR BLD AUTO: 25 % (ref 36.5–49.3)
HGB BLD-MCNC: 7.7 G/DL (ref 12–17)
LYMPHOCYTES # BLD AUTO: 0.1 THOUSAND/UL (ref 0.6–4.47)
LYMPHOCYTES # BLD AUTO: 1 % (ref 14–44)
MAGNESIUM SERPL-MCNC: 2.4 MG/DL (ref 1.6–2.6)
MCH RBC QN AUTO: 26.7 PG (ref 26.8–34.3)
MCHC RBC AUTO-ENTMCNC: 30.8 G/DL (ref 31.4–37.4)
MCV RBC AUTO: 87 FL (ref 82–98)
METAMYELOCYTES NFR BLD MANUAL: 2 % (ref 0–1)
MONOCYTES # BLD AUTO: 0.31 THOUSAND/UL (ref 0–1.22)
MONOCYTES NFR BLD: 3 % (ref 4–12)
NEUTROPHILS # BLD MANUAL: 9.49 THOUSAND/UL (ref 1.85–7.62)
NEUTS SEG NFR BLD AUTO: 93 % (ref 43–75)
NRBC BLD AUTO-RTO: 0 /100 WBCS
P AXIS: 59 DEGREES
PHOSPHATE SERPL-MCNC: 6.9 MG/DL (ref 2.7–4.5)
PLATELET # BLD AUTO: 145 THOUSANDS/UL (ref 149–390)
PLATELET BLD QL SMEAR: ABNORMAL
PMV BLD AUTO: 12.8 FL (ref 8.9–12.7)
POLYCHROMASIA BLD QL SMEAR: PRESENT
POTASSIUM SERPL-SCNC: 4.6 MMOL/L (ref 3.5–5.3)
PR INTERVAL: 192 MS
QRS AXIS: 67 DEGREES
QRSD INTERVAL: 167 MS
QT INTERVAL: 417 MS
QTC INTERVAL: 457 MS
RBC # BLD AUTO: 2.88 MILLION/UL (ref 3.88–5.62)
RBC MORPH BLD: PRESENT
SODIUM SERPL-SCNC: 133 MMOL/L (ref 136–145)
T WAVE AXIS: 247 DEGREES
VARIANT LYMPHS # BLD AUTO: 1 %
VENTRICULAR RATE: 72 BPM
WBC # BLD AUTO: 10.2 THOUSAND/UL (ref 4.31–10.16)

## 2019-02-05 PROCEDURE — 80048 BASIC METABOLIC PNL TOTAL CA: CPT | Performed by: FAMILY MEDICINE

## 2019-02-05 PROCEDURE — 85027 COMPLETE CBC AUTOMATED: CPT | Performed by: FAMILY MEDICINE

## 2019-02-05 PROCEDURE — 93010 ELECTROCARDIOGRAM REPORT: CPT | Performed by: INTERNAL MEDICINE

## 2019-02-05 PROCEDURE — 99253 IP/OBS CNSLTJ NEW/EST LOW 45: CPT | Performed by: STUDENT IN AN ORGANIZED HEALTH CARE EDUCATION/TRAINING PROGRAM

## 2019-02-05 PROCEDURE — 99232 SBSQ HOSP IP/OBS MODERATE 35: CPT | Performed by: INTERNAL MEDICINE

## 2019-02-05 PROCEDURE — 85730 THROMBOPLASTIN TIME PARTIAL: CPT | Performed by: INTERNAL MEDICINE

## 2019-02-05 PROCEDURE — G8982 BODY POS GOAL STATUS: HCPCS

## 2019-02-05 PROCEDURE — 82948 REAGENT STRIP/BLOOD GLUCOSE: CPT

## 2019-02-05 PROCEDURE — 82330 ASSAY OF CALCIUM: CPT | Performed by: FAMILY MEDICINE

## 2019-02-05 PROCEDURE — 94760 N-INVAS EAR/PLS OXIMETRY 1: CPT

## 2019-02-05 PROCEDURE — 85007 BL SMEAR W/DIFF WBC COUNT: CPT | Performed by: FAMILY MEDICINE

## 2019-02-05 PROCEDURE — 97163 PT EVAL HIGH COMPLEX 45 MIN: CPT

## 2019-02-05 PROCEDURE — 83735 ASSAY OF MAGNESIUM: CPT | Performed by: FAMILY MEDICINE

## 2019-02-05 PROCEDURE — 94003 VENT MGMT INPAT SUBQ DAY: CPT

## 2019-02-05 PROCEDURE — 84100 ASSAY OF PHOSPHORUS: CPT | Performed by: FAMILY MEDICINE

## 2019-02-05 PROCEDURE — 94640 AIRWAY INHALATION TREATMENT: CPT

## 2019-02-05 PROCEDURE — 93005 ELECTROCARDIOGRAM TRACING: CPT

## 2019-02-05 PROCEDURE — 99291 CRITICAL CARE FIRST HOUR: CPT | Performed by: INTERNAL MEDICINE

## 2019-02-05 PROCEDURE — G8981 BODY POS CURRENT STATUS: HCPCS

## 2019-02-05 RX ORDER — LORAZEPAM 2 MG/ML
INJECTION INTRAMUSCULAR
Status: COMPLETED
Start: 2019-02-05 | End: 2019-02-05

## 2019-02-05 RX ORDER — QUETIAPINE FUMARATE 25 MG/1
25 TABLET, FILM COATED ORAL 2 TIMES DAILY
Status: DISCONTINUED | OUTPATIENT
Start: 2019-02-05 | End: 2019-02-06

## 2019-02-05 RX ORDER — MAGNESIUM SULFATE HEPTAHYDRATE 40 MG/ML
2 INJECTION, SOLUTION INTRAVENOUS ONCE
Status: DISCONTINUED | OUTPATIENT
Start: 2019-02-05 | End: 2019-02-05

## 2019-02-05 RX ORDER — LORAZEPAM 2 MG/ML
1 INJECTION INTRAMUSCULAR ONCE
Status: COMPLETED | OUTPATIENT
Start: 2019-02-05 | End: 2019-02-05

## 2019-02-05 RX ORDER — LEVALBUTEROL 1.25 MG/.5ML
1.25 SOLUTION, CONCENTRATE RESPIRATORY (INHALATION)
Status: DISCONTINUED | OUTPATIENT
Start: 2019-02-05 | End: 2019-02-09

## 2019-02-05 RX ADMIN — MIDODRINE HYDROCHLORIDE 5 MG: 5 TABLET ORAL at 17:43

## 2019-02-05 RX ADMIN — NYSTATIN 1 APPLICATION: 100000 POWDER TOPICAL at 08:22

## 2019-02-05 RX ADMIN — DEXMEDETOMIDINE 0.4 MCG/KG/HR: 100 INJECTION, SOLUTION, CONCENTRATE INTRAVENOUS at 08:23

## 2019-02-05 RX ADMIN — PREDNISONE 40 MG: 20 TABLET ORAL at 08:22

## 2019-02-05 RX ADMIN — QUETIAPINE FUMARATE 25 MG: 25 TABLET ORAL at 17:43

## 2019-02-05 RX ADMIN — CEFAZOLIN SODIUM 1000 MG: 10 INJECTION, POWDER, FOR SOLUTION INTRAVENOUS at 17:43

## 2019-02-05 RX ADMIN — IPRATROPIUM BROMIDE 0.5 MG: 0.5 SOLUTION RESPIRATORY (INHALATION) at 14:09

## 2019-02-05 RX ADMIN — INSULIN LISPRO 5 UNITS: 100 INJECTION, SOLUTION INTRAVENOUS; SUBCUTANEOUS at 05:49

## 2019-02-05 RX ADMIN — DEXMEDETOMIDINE 0.6 MCG/KG/HR: 100 INJECTION, SOLUTION, CONCENTRATE INTRAVENOUS at 02:31

## 2019-02-05 RX ADMIN — INSULIN LISPRO 5 UNITS: 100 INJECTION, SOLUTION INTRAVENOUS; SUBCUTANEOUS at 00:41

## 2019-02-05 RX ADMIN — QUETIAPINE FUMARATE 25 MG: 25 TABLET ORAL at 11:19

## 2019-02-05 RX ADMIN — GABAPENTIN 100 MG: 100 CAPSULE ORAL at 21:01

## 2019-02-05 RX ADMIN — DEXMEDETOMIDINE 0.4 MCG/KG/HR: 100 INJECTION, SOLUTION, CONCENTRATE INTRAVENOUS at 23:36

## 2019-02-05 RX ADMIN — DEXMEDETOMIDINE 0.6 MCG/KG/HR: 100 INJECTION, SOLUTION, CONCENTRATE INTRAVENOUS at 05:23

## 2019-02-05 RX ADMIN — CHLORHEXIDINE GLUCONATE 0.12% ORAL RINSE 15 ML: 1.2 LIQUID ORAL at 20:17

## 2019-02-05 RX ADMIN — DEXMEDETOMIDINE 0.4 MCG/KG/HR: 100 INJECTION, SOLUTION, CONCENTRATE INTRAVENOUS at 19:21

## 2019-02-05 RX ADMIN — DEXMEDETOMIDINE 0.6 MCG/KG/HR: 100 INJECTION, SOLUTION, CONCENTRATE INTRAVENOUS at 11:46

## 2019-02-05 RX ADMIN — MIDODRINE HYDROCHLORIDE 5 MG: 5 TABLET ORAL at 11:19

## 2019-02-05 RX ADMIN — LEVALBUTEROL 1.25 MG: 1.25 SOLUTION, CONCENTRATE RESPIRATORY (INHALATION) at 19:46

## 2019-02-05 RX ADMIN — CHLORHEXIDINE GLUCONATE 0.12% ORAL RINSE 15 ML: 1.2 LIQUID ORAL at 08:21

## 2019-02-05 RX ADMIN — NYSTATIN: 100000 POWDER TOPICAL at 17:44

## 2019-02-05 RX ADMIN — SENNOSIDES A AND B 17.6 MG: 415.36 LIQUID ORAL at 20:19

## 2019-02-05 RX ADMIN — DEXMEDETOMIDINE 0.4 MCG/KG/HR: 100 INJECTION, SOLUTION, CONCENTRATE INTRAVENOUS at 15:13

## 2019-02-05 RX ADMIN — IPRATROPIUM BROMIDE 0.5 MG: 0.5 SOLUTION RESPIRATORY (INHALATION) at 19:46

## 2019-02-05 RX ADMIN — POLYETHYLENE GLYCOL 3350 17 G: 17 POWDER, FOR SOLUTION ORAL at 08:22

## 2019-02-05 RX ADMIN — LEVALBUTEROL 1.25 MG: 1.25 SOLUTION, CONCENTRATE RESPIRATORY (INHALATION) at 14:09

## 2019-02-05 RX ADMIN — HEPARIN SODIUM AND DEXTROSE 27.1 UNITS/KG/HR: 10000; 5 INJECTION INTRAVENOUS at 01:46

## 2019-02-05 RX ADMIN — AMIODARONE HYDROCHLORIDE 200 MG: 200 TABLET ORAL at 06:34

## 2019-02-05 RX ADMIN — LORAZEPAM 1 MG: 2 INJECTION INTRAMUSCULAR at 11:15

## 2019-02-05 RX ADMIN — INSULIN LISPRO 5 UNITS: 100 INJECTION, SOLUTION INTRAVENOUS; SUBCUTANEOUS at 17:44

## 2019-02-05 RX ADMIN — MIDODRINE HYDROCHLORIDE 5 MG: 5 TABLET ORAL at 06:34

## 2019-02-05 RX ADMIN — LORAZEPAM 1 MG: 2 INJECTION, SOLUTION INTRAMUSCULAR; INTRAVENOUS at 11:15

## 2019-02-05 RX ADMIN — METOPROLOL TARTRATE 12.5 MG: 25 TABLET ORAL at 08:22

## 2019-02-05 RX ADMIN — CALCIUM ACETATE 1334 MG: 667 CAPSULE ORAL at 17:43

## 2019-02-05 RX ADMIN — METOPROLOL TARTRATE 12.5 MG: 25 TABLET ORAL at 20:17

## 2019-02-05 RX ADMIN — HEPARIN SODIUM AND DEXTROSE 27.1 UNITS/KG/HR: 10000; 5 INJECTION INTRAVENOUS at 13:21

## 2019-02-05 NOTE — NUTRITION
Patient will continue to tolerate current TF Rx goal rate of Nepro @ 52 ml/hr  Monitor LFTs and renal parameters  Adjust Phosphorus

## 2019-02-05 NOTE — PLAN OF CARE
GENITOURINARY - ADULT     Maintains or returns to baseline urinary function Not Progressing    Bladder scan every shift to prevent urinary retention    SAFETY ADULT     Maintain or return to baseline ADL function Not Progressing     Maintain or return mobility status to optimal level Not Progressing    PT consulted        CARDIOVASCULAR - ADULT     Maintains optimal cardiac output and hemodynamic stability Progressing     Absence of cardiac dysrhythmias or at baseline rhythm Progressing        DISCHARGE PLANNING     Discharge to home or other facility with appropriate resources Progressing        DISCHARGE PLANNING - CARE MANAGEMENT     Discharge to post-acute care or home with appropriate resources Progressing        GENITOURINARY - ADULT     Absence of urinary retention Progressing        INFECTION - ADULT     Absence or prevention of progression during hospitalization Progressing     Absence of fever/infection during neutropenic period Progressing        Knowledge Deficit     Patient/family/caregiver demonstrates understanding of disease process, treatment plan, medications, and discharge instructions Progressing        METABOLIC, FLUID AND ELECTROLYTES - ADULT     Electrolytes maintained within normal limits Progressing     Fluid balance maintained Progressing        Nutrition/Hydration-ADULT     Nutrient/Hydration intake appropriate for improving, restoring or maintaining nutritional needs Progressing        PAIN - ADULT     Verbalizes/displays adequate comfort level or baseline comfort level Progressing        Potential for Falls     Patient will remain free of falls Progressing        Prexisting or High Potential for Compromised Skin Integrity     Skin integrity is maintained or improved Progressing        SAFETY ADULT     Patient will remain free of falls Progressing        SAFETY,RESTRAINT: NV/NON-SELF DESTRUCTIVE BEHAVIOR     Remains free of harm/injury (restraint for non violent/non self-detsructive behavior) Progressing     Returns to optimal restraint-free functioning Progressing

## 2019-02-05 NOTE — PROGRESS NOTES
NEPHROLOGY PROGRESS NOTE    Georges Recio 60 y o  male MRN: [id number]  Unit/Bed#: [facility] [place] 11 Encounter: [id number]  Reason for Consult:  Acute renal failure    Patient remains intubated but is hemodynamically stable  Tolerated dialysis yesterday  Patient remains on the ventilator  He is awake and answers questions  ASSESSMENT/PLAN:  1  Renal    Patient's acute renal failure due to ATN is dialysis dependent with no significant urine output recorded  Hemodialysis is being done on  and he now has PermCath in place  Plan for dialysis tomorrow  Will continue to monitor for any signs of improvement over the ensuing days to weeks  Hemodialysis     2  Cardiac  Patient is status post cardiogenic shock as respiratory failure ventilator dependent  SUBJECTIVE:  ROS  Patient is currently ventilated was awake eyes open not it to some questioning  He is in no distress but unable do complete review of systems  OBJECTIVE:  Current Weight: Weight - Scale: (!) 165 kg (362 lb 14 oz)  Vitals:Temp (24hrs), Av 4 °F (37 4 °C), Min:98 5 °F (36 9 °C), Max:100 1 °F (37 8 °C)  Current: Temperature: 99 1 °F (37 3 °C)   Blood pressure 92/60, pulse 72, temperature 99 1 °F (37 3 °C), temperature source Oral, resp  rate 22, height 5' 9" (1 753 m), weight (!) 165 kg (362 lb 14 oz), SpO2 99 %  , Body mass index is 53 59 kg/m²  Intake/Output Summary (Last 24 hours) at 19 0907  Last data filed at 19 0815   Gross per 24 hour   Intake          2235 83 ml   Output             2673 ml   Net          -437 17 ml       Physical Exam: BP 92/60   Pulse 72   Temp 99 1 °F (37 3 °C) (Oral)   Resp 22   Ht 5' 9" (1 753 m)   Wt (!) 165 kg (362 lb 14 oz)   SpO2 99%   BMI 53 59 kg/m²   Physical Exam   Constitutional: No distress  HENT:   Orally intubated  Neck: No JVD present  Cardiovascular: Normal rate  Exam reveals no friction rub      Pulmonary/Chest: Effort normal  No respiratory distress  He has no rales  Abdominal: Soft  Bowel sounds are normal  There is no tenderness         Medications:    Current Facility-Administered Medications:     acetaminophen (TYLENOL) oral suspension 650 mg, 650 mg, Oral, Q6H PRN, Temo Uribe MD, 650 mg at 01/24/19 1946    albuterol inhalation solution 2 5 mg, 2 5 mg, Nebulization, Q4H PRN, Jose Russell PA-C    amiodarone tablet 200 mg, 200 mg, Oral, Daily With Breakfast, Jose Russell PA-C, 200 mg at 02/05/19 3349    bisacodyl (DULCOLAX) rectal suppository 10 mg, 10 mg, Rectal, Daily PRN, Jose Russell PA-C    ceFAZolin (ANCEF) 1 g in sodium chloride 0 9% 50 ml IVPB, 1,000 mg, Intravenous, Q24H, Lilia Masters MD, Last Rate: 100 mL/hr at 02/04/19 1719, 1,000 mg at 02/04/19 1719    chlorhexidine (PERIDEX) 0 12 % oral rinse 15 mL, 15 mL, Swish & [place], Q12H [facility], Jose Russell PA-C, 15 mL at 02/05/19 8220    dexmedetomidine (PRECEDEX) 200 mcg in sodium chloride 0 9 % 50 mL infusion, 0 1-0 7 mcg/kg/hr, Intravenous, Titrated, Temo Uribe MD, Last Rate: 14 5 mL/hr at 02/05/19 0823, 0 4 mcg/kg/hr at 02/05/19 3178    gabapentin (NEURONTIN) capsule 100 mg, 100 mg, Oral, HS, Darby Mejia PA-C, 100 mg at 02/04/19 2106    heparin (porcine) 25,000 units in 250 mL infusion (premix), 3-30 Units/kg/hr (Order-Specific), Intravenous, Titrated, Lavinia Rosa PA-C, Last Rate: 24 4 mL/hr at 02/05/19 0707, 27 1 Units/kg/hr at 02/05/19 0707    HYDROmorphone (DILAUDID) injection 1 mg, 1 mg, Intravenous, Q3H PRN, Lidya Martinez, 1 mg at 02/04/19 1819    insulin lispro (HumaLOG) 100 units/mL subcutaneous injection 5-25 Units, 5-25 Units, Subcutaneous, Q6H [facility], 5 Units at 02/05/19 0549 **AND** Fingerstick Glucose (POCT), , , Q6H, Jose Russell PA-C    metoprolol (LOPRESSOR) injection 2 5 mg, 2 5 mg, Intravenous, Q6H PRN, Lilia Masters MD    metoprolol tartrate (LOPRESSOR) partial tablet 12 5 mg, 12 5 mg, Oral, Q12H [facility], Randy Aguero DO Jeison, 12 5 mg at 02/05/19 1357    midodrine (PROAMATINE) tablet 5 mg, 5 mg, Oral, TID AC, KATHY Martinez, 5 mg at 02/05/19 2435    nystatin (MYCOSTATIN) powder, , Topical, BID, Lisa HERBERT Santos, 1 application at 96/08/45 0997    oxyCODONE (ROXICODONE) oral solution 10 mg, 10 mg, Oral, Q4H PRN, iLdya Shawkh, 10 mg at 02/04/19 0821    polyethylene glycol (MIRALAX) packet 17 g, 17 g, Oral, Daily, KATHY Mathias, 17 g at 02/05/19 0721    polyvinyl alcohol (LIQUIFILM TEARS) 1 4 % ophthalmic solution 1 drop, 1 drop, Both Eyes, Q3H PRN, Lorrie Hernandez PA-C, 1 drop at 01/31/19 1649    predniSONE tablet 40 mg, 40 mg, Oral, Daily, [employer], PA-C, 40 mg at 02/05/19 8481    senna 8 8 mg/5 mL oral syrup 17 6 mg, 17 6 mg, Oral, BID, KATHY Martinez, 17 6 mg at 02/04/19 1720    Laboratory Results:  Lab Results   Component Value Date    WBC 10 20 (H) 02/05/2019    HGB 7 7 (L) 02/05/2019    HCT 25 0 (L) 02/05/2019    MCV 87 02/05/2019     (L) 02/05/2019     Lab Results   Component Value Date    GLUCOSE 92 04/09/2015    CALCIUM 7 8 (L) 02/05/2019     04/09/2015    K 4 6 02/05/2019    CO2 25 02/05/2019     02/05/2019    BUN 58 (H) 02/05/2019    CREATININE 4 06 (H) 02/05/2019     Lab Results   Component Value Date    CALCIUM 7 8 (L) 02/05/2019    PHOS 6 9 (H) 02/05/2019     No results found for: LABPROT

## 2019-02-05 NOTE — RESPIRATORY THERAPY NOTE
RT Ventilator Management Note  Wes Stallings 60 y o  male MRN: [id number]  Unit/Bed#: [facility] 11 Encounter: [id number]      Daily Screen       2/4/2019 0832 2/5/2019 0740          Patient safety screen outcome[de-identified] Passed Passed      Not Ready for Weaning due to[de-identified] Going on Transport intubated -      Spont breathing trial % for 30 min: - Yes      Spont breathing trial outcome[de-identified] - Passed      Spont breathing trial reason failed: - RR > 35 bpm      Previous settings resumed: - Yes      Name of Medical Team Notified[de-identified] - RN              Physical Exam:   Assessment Type: Assess only  General Appearance: Sedated  Respiratory Pattern: Assisted  Chest Assessment: Chest expansion symmetrical  Bilateral Breath Sounds: Diminished      Resp Comments: Placed back on AC  Pt did not tolerate wean  increased wob, Weaned CPAP/PS for about 45min   Placed back on AC due to increased wob

## 2019-02-05 NOTE — CONSULTS
Consultation - General Surgery  Delgado Amador 60 y o  male MRN: [id number]  Unit/Bed#: [facility] 11 Encounter: [id number]        Assessment/Plan     Assessment:  60year old male with MSSA sepsis and PATRICK, Afib with RVR    Plan:  Plan to proceed with Trach/PEG  Hold heparin gtt 6 hours prior to OR  Will plan on Thursday 2/7 if not extubated by then  Continue supportive care per MICU    History of Present Illness     HPI:  Delgado Amador is a 60 y o  male who is admitted to the hospital with sepsis, MSSA bacteremia, AI, and AFib with RVR  He has a history of obesity, hypertension, and a bioprosthetic aortic valve replacement  He initially presented to moderate with MSSA bacteremia and renal failure  He presented with confusion and dyspnea and was intubated in the ED on 01/24/2019  He was placed on vasopressors for presumed mixed septic and cardiogenic shock  He was transferred to [facility] for further management  He was also found to have an PATRICK with oligo/anuria and started on CVVH  Currently he remains in the medical Intensive Care unit with ventilator dependent respiratory failure  He has been weaned off pressors and transient intermittent hemodialysis  However he has had difficulty weaning from the ventilator  Per report, he becomes tachycardic and tachypneic on sedation trials  Today is day 12 of intubation  He has been tolerating tube feeds at goal and remains on stable ventilator settings  He does have a new diagnosis of Afib for which he is on heparin  Per chart review he does not have any history of prior neck surgery or radiation  No known prior abdominal surgeries        Review of Systems   Unable to perform ROS: Intubated       Historical Information   Past Medical History:   Diagnosis Date    Allergic rhinitis     last assessed 9/12/12    Finger fracture, right     Closed fx of the middle phalanx of the right 5th finger  last assessed 1/30/14    Hemorrhoids     last assessed 2/10/14  Hyperlipidemia     Hypertension      Past Surgical History:   Procedure Laterality Date    AORTIC VALVE REPLACEMENT  2014    AVR with 25mm [facility] Ease bovine pericardial valve    CARDIAC CATHETERIZATION  2014    SLB left main-normal, circumflex-normal, ramus intermedius-normal, RCA was large and dominant giving rise to the PDA & a large posterolateral branch  No disease  last assessed 14     LUPE MINA LAST HSPTL PLACEMENT  2019    KNEE CARTILAGE SURGERY Left     medial meniscus tear     TESTICLE SURGERY      TONSILLECTOMY AND ADENOIDECTOMY      TOOTH EXTRACTION       Social History   History   Alcohol Use No     Comment: ocassion /never drank alcohol per [employer]      History   Drug Use No     History   Smoking Status    Never Smoker   Smokeless Tobacco    Never Used     Family History:   Family History   Problem Relation Age of Onset    Lung cancer Mother     Heart disease Mother         pacemaker placement     Coronary artery disease Father     Hypertension Father     Heart attack Father     Cancer Family         bladder        Meds/Allergies   PTA meds:   Prior to Admission Medications   Prescriptions Last Dose Informant Patient Reported? Taking?    amLODIPine (NORVASC) 5 mg tablet   No No   Sig: Take 1 tablet (5 mg total) by mouth daily   ascorbic acid (VITAMIN C) 500 MG tablet   Yes No   Sig: Take 1 tablet by mouth 2 (two) times a day   aspirin (ECOTRIN) 325 mg EC tablet   No No   Sig: Take 1 tablet (325 mg total) by mouth daily   fluticasone (FLONASE) 50 mcg/act nasal spray   Yes No   Si spray into each nostril 2 (two) times a day   loratadine (CLARITIN) 10 mg tablet   Yes No   Sig: Take 1 tablet by mouth   metoprolol tartrate (LOPRESSOR) 100 mg tablet   No No   Sig: Take 1 tablet (100 mg total) by mouth 2 (two) times a day   potassium chloride (K-DUR,KLOR-CON) 20 mEq tablet   No No   Sig: Take 1 tablet (20 mEq total) by mouth daily   pravastatin (PRAVACHOL) 40 mg tablet   No No   Sig: Take 1 tablet (40 mg total) by mouth daily at bedtime   torsemide (DEMADEX) 20 mg tablet   No No   Sig: Take 1 tablet (20 mg total) by mouth daily      Facility-Administered Medications: None     Allergies   Allergen Reactions    Penicillins Rash     However, has subsequently tolerated Cefazolin and Cefepime       Objective   First Vitals:   Blood Pressure: 124/72 (01/24/19 1530)  Pulse: 94 (01/24/19 1530)  Temperature: 99 °F (37 2 °C) (01/24/19 1530)  Temp Source: Rectal (01/24/19 1530)  Respirations: 20 (01/24/19 1530)  Height: 5' 9" (175 3 cm) (01/24/19 1530)  Weight - Scale: (!) 145 kg (319 lb 3 6 oz) (01/24/19 1530)  SpO2: 98 % (01/24/19 1500)    Current Vitals:   Blood Pressure: 108/68 (02/05/19 1405)  Pulse: 72 (02/05/19 1405)  Temperature: 99 6 °F (37 6 °C) (02/05/19 1130)  Temp Source: Oral (02/05/19 1130)  Respirations: 20 (02/05/19 1405)  Height: 5' 9" (175 3 cm) (01/24/19 1530)  Weight - Scale: (!) 165 kg (362 lb 14 oz) (02/05/19 0415)  SpO2: 95 % (02/05/19 1412)      Intake/Output Summary (Last 24 hours) at 02/05/19 1522  Last data filed at 02/05/19 1340   Gross per 24 hour   Intake          2365 98 ml   Output             2673 ml   Net          -307 02 ml       Invasive Devices     Central Venous Catheter Line            CVC Central Lines 02/04/19 Triple Left Internal jugular 1 day          Hemodialysis Catheter            Permanent HD Catheter  1 day          Drain            NG/OG/Enteral Tube Orogastric 3 days          Airway            ETT  Cuffed 7 5 mm 12 days                Physical Exam   Constitutional: He appears well-developed and well-nourished  HENT:   Head: Normocephalic and atraumatic  Eyes: Pupils are equal, round, and reactive to light  Neck: No tracheal deviation present  Cardiovascular: Normal rate      Pulmonary/Chest:   Mechanically ventilated   Abdominal:   Soft, obese, non-distended, no rebound or guarding   Musculoskeletal:   Generalized 2+ pitting edema   Neurological:   sedated   Skin: Skin is warm and dry  Lab Results:   CBC:   Lab Results   Component Value Date    WBC 10 20 (H) 02/05/2019    HGB 7 7 (L) 02/05/2019    HCT 25 0 (L) 02/05/2019    MCV 87 02/05/2019     (L) 02/05/2019    MCH 26 7 (L) 02/05/2019    MCHC 30 8 (L) 02/05/2019    RDW 18 0 (H) 02/05/2019    MPV 12 8 (H) 02/05/2019    NRBC 0 02/05/2019   , CMP:   Lab Results   Component Value Date    SODIUM 133 (L) 02/05/2019    K 4 6 02/05/2019     02/05/2019    CO2 25 02/05/2019    BUN 58 (H) 02/05/2019    CREATININE 4 06 (H) 02/05/2019    CALCIUM 7 8 (L) 02/05/2019    EGFR 15 02/05/2019     Imaging: I have personally reviewed pertinent reports  EKG, Pathology, and Other Studies: I have personally reviewed pertinent reports        Code Status: Level 1 - Full Code  Advance Directive and Living Will:      Power of :    POLST:

## 2019-02-05 NOTE — PROGRESS NOTES
Cardiology Progress Note - Kofi Heath 60 y o  male MRN: [id number]    [facility] Encounter: [id number]      Assessment:  Principal Problem:    Cardiogenic shock ([facility] )  Active Problems:    Increased BMI    NSTEMI (non-ST elevated myocardial infarction) ([facility] )    PATRICK (acute kidney injury) ([facility] )    Stress-induced cardiomyopathy    Acute respiratory failure with hypoxia ([place] [id number] )    History of aortic valve replacement with bioprosthetic valve    Atrial fibrillation (HCC)    Septic shock (HCC)    Staphylococcus aureus bacteremia    Transaminitis    Thrombocytopenia (HCC)    Anemia    Leukocytosis        Plan:  1  HFrEF- LVEF-35%, LVIDd-6 5 cm- Cardiomyopathy in the setting of Septic shock with possible component of cardiogenic shock- Biventricular failure  - Clinically warm with dependent edema  - Stable off Milrinone  - I/O-2411/2673 with net negative 254, continue volume removal with dialysis  - Echo 1/25- Upper normal LV size with LVEF-30%, Concentric hypertrophy, Dilated RV with reduced function, Dyssynergic septum  Bioprosthetic aortic valve- not very well visualized- mean gradient of 13  - His cardiomyopathy is predominantly sepsis mediated myocardial dysfunction     2  MSSSA bacteremia with possible source being pneumonia  - On Cefazolin   - Sputum with staph aureus, 1/27- blood cultures- no growth  - CHON with no evidence of endocarditis       3  Bioprosthetic AVR for severe degenerative AS- 25 mm Champagne Magna in July 2014  - Gradients across the valve in the past have been around 21, current gradients are 13- CHON- no endocarditis     4  Troponin elevation  - Troponins elevated to above 40, this is in the setting of septic shock  - EKG with no ischemic changes  - Normal cath in 2014  5  New onset Atrial fibrillation in the setting of sepsis  - on oral amiodarone 200 mg daily and metoprolol tartrate 12 5 mg twice daily  - anticoagulation with IV heparin- continue for now       6   PATRICK- in the setting of septic shock  - on  HD    7  Microcytic anemia  - Drop in H&H in the last four days  8  Acute hypoxic respiratory failure with failure to wean         Subjective:   Patient seen and examined  Remains intubated, keeps failing SBT  Following commands, appears comfortable      Telemetry- Atrial fibrillation/flutter with controlled ventricular rates in the 70-80    Objective:     Vitals: Blood pressure 92/60, pulse 72, temperature 99 1 °F (37 3 °C), temperature source Oral, resp  rate 22, height 5' 9" (1 753 m), weight (!) 165 kg (362 lb 14 oz), SpO2 99 %  , Body mass index is 53 59 kg/m² ,   Orthostatic Blood Pressures      Most Recent Value   Blood Pressure  92/60 filed at 02/05/2019 0705   Patient Position - Orthostatic VS  Lying filed at 02/05/2019 0415            Intake/Output Summary (Last 24 hours) at 02/05/19 0912  Last data filed at 02/05/19 0815   Gross per 24 hour   Intake          2235 83 ml   Output             2673 ml   Net          -437 17 ml         Physical Exam:    GEN: Delfino Raymond intubated, follows commands  HEENT: anicteric, mucous membranes moist  NECK: no jvd, no carotid bruits, right and left central lines  HEART: Irregular rhythm, normal S1 and S2,  no murmurs, clicks, gallops or rubs   LUNGS: rhonchi in the anterior lung fields   ABDOMEN: normal bowel sounds, soft, no tenderness, no distention  EXTREMITIES: peripheral pulses 1+; bluish discoloration of the toes, warm feet, dependent edema of bilateral upper and lower extremities  NEURO: no focal findings   SKIN: coin shaped excoriations of the upper back      Current Facility-Administered Medications:     acetaminophen (TYLENOL) oral suspension 650 mg, 650 mg, Oral, Q6H PRN, Cristina Smith MD, 650 mg at 01/24/19 1946    albuterol inhalation solution 2 5 mg, 2 5 mg, Nebulization, Q4H PRN, Saba Peterson PA-C    amiodarone tablet 200 mg, 200 mg, Oral, Daily With Breakfast, Saba Peterson PA-C, 200 mg at 02/05/19 1258    bisacodyl (DULCOLAX) rectal suppository 10 mg, 10 mg, Rectal, Daily PRN, Lianne Perez PA-C    ceFAZolin (ANCEF) 1 g in sodium chloride 0 9% 50 ml IVPB, 1,000 mg, Intravenous, Q24H, Carrington Pompa MD, Last Rate: 100 mL/hr at 02/04/19 1719, 1,000 mg at 02/04/19 1719    chlorhexidine (PERIDEX) 0 12 % oral rinse 15 mL, 15 mL, Swish & [place]it, Q12H [address], Lianne Perez PA-C, 15 mL at 02/05/19 7687    dexmedetomidine (PRECEDEX) 200 mcg in sodium chloride 0 9 % 50 mL infusion, 0 1-0 7 mcg/kg/hr, Intravenous, Titrated, Mar Post MD, Last Rate: 14 5 mL/hr at 02/05/19 0823, 0 4 mcg/kg/hr at 02/05/19 9206    gabapentin (NEURONTIN) capsule 100 mg, 100 mg, Oral, HS, Misael Mejia PA-C, 100 mg at 02/04/19 2106    heparin (porcine) 25,000 units in 250 mL infusion (premix), 3-30 Units/kg/hr (Order-Specific), Intravenous, Titrated, Jaquelin Botello PA-C, Last Rate: 24 4 mL/hr at 02/05/19 0707, 27 1 Units/kg/hr at 02/05/19 0707    HYDROmorphone (DILAUDID) injection 1 mg, 1 mg, Intravenous, Q3H PRN, [facility], 1 mg at 02/04/19 1819    insulin lispro (HumaLOG) 100 units/mL subcutaneous injection 5-25 Units, 5-25 Units, Subcutaneous, Q6H [address], 5 Units at 02/05/19 0549 **AND** Fingerstick Glucose (POCT), , , Q6H, Lianne Perez PA-C    metoprolol (LOPRESSOR) injection 2 5 mg, 2 5 mg, Intravenous, Q6H PRN, Carrington Pompa MD    metoprolol tartrate (LOPRESSOR) partial tablet 12 5 mg, 12 5 mg, Oral, Q12H [address], Rebecca Robins, DO, 12 5 mg at 02/05/19 5990    midodrine (PROAMATINE) tablet 5 mg, 5 mg, Oral, TID AC, KATHY Jennings, 5 mg at 02/05/19 9191    nystatin (MYCOSTATIN) powder, , Topical, BID, Lianne Perez PA-C, 1 application at 87/83/23 0095    oxyCODONE (ROXICODONE) oral solution 10 mg, 10 mg, Oral, Q4H PRN, [facility]id[facility], 10 mg at 02/04/19 0821    polyethylene glycol (MIRALAX) packet 17 g, 17 g, Oral, Daily, KATHY Mathias, 17 g at 02/05/19 8886    polyvinyl alcohol (LIQUIFILM TEARS) 1 4 % ophthalmic solution 1 drop, 1 drop, Both Eyes, Q3H PRN, Ángela Night, PA-C, 1 drop at 01/31/19 1649    predniSONE tablet 40 mg, 40 mg, Oral, Daily, [employer], PA-C, 40 mg at 02/05/19 0946    senna 8 8 mg/5 mL oral syrup 17 6 mg, 17 6 mg, Oral, BID, Sayda Wade, KENIANP, 17 6 mg at 02/04/19 1720    Labs & Results:    Lab Results   Component Value Date    CKTOTAL 463 (H) 02/01/2019    CKTOTAL 3,180 (H) 01/27/2019    CKTOTAL 10,421 (H) 01/26/2019    CKMB 3 9 02/01/2019    CKMB 14 9 (H) 01/27/2019    CKMB 38 7 (H) 01/26/2019    CKMBINDEX <1 0 02/01/2019    CKMBINDEX <1 0 01/27/2019    CKMBINDEX <1 0 01/26/2019    TROPONINI 36 20 (H) 01/24/2019    TROPONINI 36 30 (H) 01/24/2019    TROPONINI 34 90 (H) 01/24/2019       Lab Results   Component Value Date    GLUCOSE 92 04/09/2015    CALCIUM 7 8 (L) 02/05/2019     04/09/2015    K 4 6 02/05/2019    CO2 25 02/05/2019     02/05/2019    BUN 58 (H) 02/05/2019    CREATININE 4 06 (H) 02/05/2019       Lab Results   Component Value Date    WBC 10 20 (H) 02/05/2019    HGB 7 7 (L) 02/05/2019    HCT 25 0 (L) 02/05/2019    MCV 87 02/05/2019     (L) 02/05/2019       Results from last 7 days  Lab Units 01/29/19  1057   INR  1 07       Lab Results   Component Value Date    CHOL 146 07/18/2014    CHOL 145 02/21/2014     Lab Results   Component Value Date    HDL 41 06/02/2018    HDL 52 06/27/2017     Lab Results   Component Value Date    LDLCALC 57 06/02/2018    LDLCALC 129 (H) 06/27/2017     Lab Results   Component Value Date    TRIG 102 06/02/2018    TRIG 71 06/27/2017       Lab Results   Component Value Date    ALT 19 02/01/2019    AST 80 (H) 02/01/2019

## 2019-02-05 NOTE — PLAN OF CARE
Problem: PHYSICAL THERAPY ADULT  Goal: Performs mobility at highest level of function for planned discharge setting  See evaluation for individualized goals  Treatment/Interventions: LE strengthening/ROM, Therapeutic exercise, Endurance training, Patient/family training, Cognitive reorientation, Equipment eval/education, Bed mobility, Spoke to advanced practitioner, Spoke to case management, Spoke to nursing  Equipment Recommended:  (TBD- may benefit from [facility] bed)       See flowsheet documentation for full assessment, interventions and recommendations  Prognosis: Guarded (medical status)  Problem List: Decreased strength, Decreased range of motion, Decreased endurance, Impaired balance, Decreased mobility, Decreased coordination, Decreased cognition, Impaired judgement, Decreased safety awareness, Obesity, Decreased skin integrity  Assessment: Pt is 60 y o  male seen for PT evaluation s/p admit to [facility] on 1/24/2019  Pt was transferred from [place] for higher level of care  Pt where he initially presented w/ fevers and back neck redness; confusion and dyspnea- spouse called EMS  Current dx/ problem list includes: cardiogenic shock; NSTEMI; PATRICK; cardiomyopathy; acute respiratory failure w/ hypoxia; afib; sepsis; thrombocytopenia; anemia; and leukocytosis  PT now consulted for assessment of mobility and d/c needs- slow to wean off vent- cleared for act as tolerated     Comorbidities affecting pt's physical performance at time of assessment listed above and significant for morbid obesity- OA of B/L knees; cataracts; HTN; AS; PAD- pt currently being managed on vent/ not sedated and w/ orders for act as tolerated- cleared for initiation of therapies Personal factors affecting pt at time of IE include: steps to enter environment, limited home support (spouse unable to physically assist pt),  inability to perform IADLs, inability to perform ADLs, inability to ambulate household distances, limited insight into impairments Prior to admission, pt was living at home w/ spouse  And MIL in a [facility] w/ 5 BRENT and main bed and bath on 2nd floor  Pt was  working FT as as  and driving- would occasionally use RW for knee pain- had A w/ LB dressing 2* obesity  (obtained from [employer]); as pt intubated and    Upon evaluation, pt currently is intubated; opens eyes to name and nods yes and no to questioning appropriately- moves all 4 extremities on command w/ delay and is able to give thumbs up on L and R- pt is extremely weak proximally and required DEP Ax3 for attempt at long sit- fatigues quickly and closes eyes/ lethargic by conclusion of minimal activity and AAROM of all limbs- VSS throughout on vent- pt reposition for comfort post session w/ DEP A x3 and [employer] updated- PT tos ee for progression as able and attempt at progression to EOB when more awake and alert- Pt will require extensive rehab on d/c prior to home  Pt presents functioning significantly  below baseline and currently w/ overall mobility deficits 2* to: vented; multiple lines and management in MICU; morbid obesity; LE wounds;  decreased LE strength/AROM; limited flexibility; Severe weakness/ deconditioning; decreased endurance; decreased activity tolerance; decreased coordination; limited insight into current deficitsPlease find additional objective findings from PT assessment regarding body systems outlined above ) Pt        Recommendation: Post acute IP rehab     PT - OK to Discharge: No    See flowsheet documentation for full assessment

## 2019-02-05 NOTE — PROGRESS NOTES
Progress Note - Critical Care   Yulissa Mayers 60 y o  male MRN: [id number]  Unit/Bed#: [facility] Encounter: [id number]    Attending Physician: Jose Sprague, DO      ______________________________________________________________________  Assessment and Plan:   Principal Problem:    Cardiogenic shock ([facility] )  Active Problems:    Increased BMI    NSTEMI (non-ST elevated myocardial infarction) ([facility] )    PATRICK (acute kidney injury) ([facility] )    Stress-induced cardiomyopathy    Acute respiratory failure with hypoxia ([facility] )    History of aortic valve replacement with bioprosthetic valve    Atrial fibrillation ([facility] )    Septic shock (HCC)    Staphylococcus aureus bacteremia    Transaminitis    Thrombocytopenia (HCC)    Anemia    Leukocytosis  Resolved Problems:    Acute encephalopathy    Rhabdomyolysis    79-year-old, PMHx of  HTN, bioprosthetic valve admitted with shock of multifactorial etiology with PATRICK and newly diagnosed AFib with RVR     Neuro:   · Patient is alert, following commands today  · Sedation & Analgesia: Precedex  · Neuro checks as per unit protocol  · Take precautions to prevent ICU delirium   Monitor GCS     Peripheral Neuropathy  · Gabapentin 100 daily     Conjunctivitis  · Artificial tears     CV:   Shock, likely multifactorial,off pressors  Midodrine decreased 5 mg 3x daily  Continue to monitor with MAP goal >65      New onset A fib with RVR   - Amiodarone oral   - A fib with HR is the mid 80 to 100, with intermittent HR in the 108-116  - Metoprolol 12 5 mg bid , per HF team  - On Heparin drip for [facility]  - per Cardiology, consider a repeat TTE when clinically improved  - continue tele monitoring  - Echo 1/25: EF 30% with moderate diffuse hypokinesis, no evidence of vegetations  - History of Bioprosthetic AoVR     NSTEMI type 2 2/2 shock   Continuous telemetry      Pulm:   Acute hypoxic respiratory failure  - Intubated on vent settings 16/450/40%/5- day 12  - will consider Trach and Peg today if pt fails SBT today  - SPO2 goal > 92%   -will try to wean off ventilator as tolerated  - Prednisone 40 mg daily  - Albuterol nebs prn  -monitor secretions        GI:   Shock liver vs hepatic congestion  · GI ppx: not indicated, pt on TF  · Bowel regimen: Miralax, senna, Dulcolax ( will hold) pt had watery stool tyoday     :   PATRICK likely 2/2 ischemia vs Septic ATN vs Rhabdo  · CRRT dc'd transitioned to IHD 02/01  · HD yesterday with 3 6 L removed  · UOP -anuric   · Monitor I&Os, net - 427 mls over past 24H and  + 1 2 L since admission  · Cr 4 06(BL 0 7-0 8),  · CK improved markedly, will stop trending  · PermaCath placed by IR r subclavian ab=nd LIJ exchange  · Nephrology following and recommend HD M/W/F        F/E/N:  1  Fluids/Electrolytes  · Will monitor  Electrolytes and Replete as needed       2   Nutrition: TF Nepro at goal of 52        ID:   MSSA bacteremia , likely 2/2 MSSA PNA in the setting of positive Sputum Culture  WBC gradually improving  Bcx 1/27 negative so far  CHON negative for vegetation, EF  30%  Day # 11 of Ancef, renally dosed ,will treat for at least 6 weeks, per ID  Trend fever curve and WBC   Per ID, may consider spine imaging         Heme:   Thrombocytopenia likely consumptive, improving  Hbg- stable at 8 2, likely dilutional, pt has no acute bleeding, will continue to trend CBC  tranfuse as needed with goal >7  DVT ppx:  On heparin drip for AC, PTT- 71, at therapeutic goal   Continue SCDs     Endo:   Blood sugars well controlled, 109-134 over the last 24 hrs  Goal of 140-180  Will continue to monitor  SSI     · Msk/Skin:  · Podiatry: trimmed toe nails and recommends strict off loading of heels to bed                        Recommends reconsulting if DTI to L hallux worsens    · Turn/position 2 hourly  · Wound care   · PT/OT when appropriate               Disposition: continued ICU stay  Code Status: Level 1 - Full Code    Counseling / Coordination of Care  Total Critical Care time spent 30 minutes excluding procedures, teaching and family updates  ______________________________________________________________________    Chief Complaint: intubated    24 Hour Events:   Concerns with ETT out further, other no major issues  ______________________________________________________________________    Physical Exam:     Physical Exam   Constitutional:   obese   HENT:   Head: Normocephalic and atraumatic  Eyes: Conjunctivae are normal    Cardiovascular:   Irregular irregular   Pulmonary/Chest:   Diminished BS bl   Abdominal: Bowel sounds are normal    Neurological: He is alert  Follows commands   Skin:   Scabbed wounds healing well       ______________________________________________________________________  Vitals:    19 4792 19 0415 19 0500 19 0501   BP:  96/60 93/57    BP Location:  Right arm     Pulse: 70 72 72    Resp: (!) 24 21 20    Temp: 99 5 °F (37 5 °C)      TempSrc: Oral      SpO2: 100% 97% 97% 97%   Weight:  (!) 165 kg (362 lb 14 oz)     Height:           Temperature:   Temp (24hrs), Av 8 °F (37 7 °C), Min:98 5 °F (36 9 °C), Max:100 5 °F (38 1 °C)    Current Temperature: 99 5 °F (37 5 °C)  Weights:   IBW: 70 7 kg    Body mass index is 53 59 kg/m²    Weight (last 2 days)     Date/Time   Weight    19 0415  (!)  165 (362 88)    19 0600  (!)  168 (371 03)            Hemodynamic Monitoring:  N/A     Non-Invasive/Invasive Ventilation Settings:  Respiratory    Lab Data (Last 4 hours)    None         O2/Vent Data (Last 4 hours)       0501           Vent Mode AC/VC       Resp Rate (BPM) (BPM) 16       Vt (mL) (mL) 450       FIO2 (%) (%) 40       PEEP (cmH2O) (cmH2O) 5       MV 9 7                 No results found for: PHART, UGR4PBX, PO2ART, NJD0KTH, Z9LBMCDF, BEART, SOURCE     Intake and Outputs:  I/O        07 -  07 07 -  0700    I V  (mL/kg) 1444 6 (8 6) 1475 3 (8 9)    NG/GT 60 280    IV Piggyback 50     Feedings 805 664 Total Intake(mL/kg) 2359 6 (14) 2419 3 (14 7)    Other 2500 2673    Total Output 2500 2673    Net -140 4 -253 7          Unmeasured Stool Occurrence  1 x        Nutrition:        Diet Orders            Start     Ordered    01/26/19 0848  Diet Enteral/Parenteral; Tube Feeding No Oral Diet; Nepro; Continuous; 52  Diet effective now     Question Answer Comment   Diet Type Enteral/Parenteral    Enteral/Parenteral Tube Feeding No Oral Diet    Tube Feeding Formula: Nepro    Bolus/Cyclic/Continuous Continuous    Tube Feeding Goal Rate (mL/hr): 52    RD to adjust diet per protocol? Yes        01/26/19 0848          Labs:     Results from last 7 days  Lab Units 02/05/19  0541 02/04/19  0514 02/03/19  1208 02/02/19  0507   WBC Thousand/uL 10 20* 15 64* 15 89* 18 08*   HEMOGLOBIN g/dL 7 7* 8 2* 8 5* 8 7*   HEMATOCRIT % 25 0* 26 0* 26 7* 28 3*   PLATELETS Thousands/uL 145* 140* 131* 131*   MONO PCT %  --  5 5 4       Results from last 7 days  Lab Units 02/04/19  0514 02/03/19  0449 02/02/19  0507  02/01/19  0605   POTASSIUM mmol/L 4 7 4 6 4 3  < > 4 5   CHLORIDE mmol/L 99* 100 102  < > 104   CO2 mmol/L 25 24 24  < > 23   BUN mg/dL 56* 51* 43*  < > 35*   CREATININE mg/dL 3 94* 3 28* 2 61*  < > 2 13*   CALCIUM mg/dL 7 9* 7 9* 8 0*  < > 8 4   ALK PHOS U/L  --   --   --   --  108   ALT U/L  --   --   --   --  19   AST U/L  --   --   --   --  80*   < > = values in this interval not displayed  Results from last 7 days  Lab Units 02/04/19  0514 02/02/19  0507 02/01/19  1139   MAGNESIUM mg/dL 2 2 2 1 2 0     Lab Results   Component Value Date    PHOS 5 1 (H) 02/04/2019    PHOS 6 2 (H) 02/04/2019    PHOS 4 1 02/02/2019        Results from last 7 days  Lab Units 02/05/19  0414 02/04/19  2050 02/04/19  0834  01/29/19  1057   INR   --   --   --   --  1 07   PTT seconds 73* 48* 58*  < > 33   < > = values in this interval not displayed      0  Lab Value Date/Time   TROPONINI 36 20 (H) 01/24/2019 2137   TROPONINI 36 30 (H) 01/24/2019 1831 TROPONINI 34 90 (H) 01/24/2019 1613   TROPONINI >40 00 (HH) 01/24/2019 0502   TROPONINI >40 00 (HH) 01/24/2019 0207   TROPONINI 36 61 (HH) 01/23/2019 2251   TROPONINI 24 29 (HH) 01/23/2019 1850   TROPONINI 14 20 (HH) 01/23/2019 1600         ABG:  Lab Results   Component Value Date    PHART 7 477 (H) 01/26/2019    MYV6OZY 29 6 (LL) 01/26/2019    PO2ART 71 7 (L) 01/26/2019    [id number] 21 4 (L) 01/26/2019    BEART -1 1 01/26/2019    SOURCE Line, Arterial 01/26/2019     Imaging: CXR: ET tube projects peripherally  NG tube extends below the GE junction, below the scope of this exam   Left internal jugular central line and right-sided central line are unchanged in position  ;    Micro:  Lab Results   Component Value Date    BLOODCX No Growth After 5 Days  01/27/2019    BLOODCX No Growth After 5 Days  01/27/2019    BLOODCX Staphylococcus aureus (A) 01/26/2019    BLOODCX Staphylococcus aureus (A) 01/26/2019    URINECX 3988-1293 cfu/ml Gram Positive Cocci (A) 01/23/2019    SPUTUMCULTUR 1+ Growth of Staphylococcus aureus (A) 01/24/2019     Allergies:    Allergies   Allergen Reactions    Penicillins Rash     However, has subsequently tolerated Cefazolin and Cefepime     Medications:   Scheduled Meds:  Current Facility-Administered Medications:  acetaminophen 650 mg Oral Q6H PRN Era Saenz MD    albuterol 2 5 mg Nebulization Q4H PRN Rivas Negron PA-C    amiodarone 200 mg Oral Daily With Breakfast Rivas Negron PA-C    bisacodyl 10 mg Rectal Daily PRN Dorhazel Negron PA-C    cefazolin 1,000 mg Intravenous Q24H Olga Gallardo MD Last Rate: 1,000 mg (02/04/19 9565)   chlorhexidine 15 mL Swish & Spit Q12H [facility] Rivas Negron PA-C    dexmedetomidine 0 1-0 7 mcg/kg/hr Intravenous Titrated Era Saenz MD Last Rate: 0 6 mcg/kg/hr (02/05/19 5927)   gabapentin 100 mg Oral HS Eduardo [place], PA-C    heparin (porcine) 3-30 Units/kg/hr (Order-Specific) Intravenous Titrated Olimpia Maria PA-C Last Rate: 27 1 Units/kg/hr (02/05/19 0146)   HYDROmorphone 1 mg Intravenous Q3H PRN Lidya Martinez    insulin lispro 5-25 Units Subcutaneous Q6H [address] Janeth Sánchez PA-C    metoprolol 2 5 mg Intravenous Q6H PRN Allan Mayers MD    metoprolol tartrate 12 5 mg Oral Q12H [address] [place], DO    midodrine 5 mg Oral TID AC KATHY Dejesus    nystatin  Topical BID Janeth Sánchez PA-C    oxyCODONE 10 mg Oral Q4H PRN Lidya Martinez    polyethylene glycol 17 g Oral Daily KATHY Mathias    polyvinyl alcohol 1 drop Both Eyes Q3H PRN Adolph Martin PA-C    predniSONE 40 mg Oral Daily [address] [facility] Gerry Hernandez PA-C    senna 17 6 mg Oral BID KATHY Dejesus      Continuous Infusions:  dexmedetomidine 0 1-0 7 mcg/kg/hr Last Rate: 0 6 mcg/kg/hr (02/05/19 0523)   heparin (porcine) 3-30 Units/kg/hr (Order-Specific) Last Rate: 27 1 Units/kg/hr (02/05/19 0146)     PRN Meds:    acetaminophen 650 mg Q6H PRN   albuterol 2 5 mg Q4H PRN   bisacodyl 10 mg Daily PRN   HYDROmorphone 1 mg Q3H PRN   metoprolol 2 5 mg Q6H PRN   oxyCODONE 10 mg Q4H PRN   polyvinyl alcohol 1 drop Q3H PRN     VTE Pharmacologic Prophylaxis: Heparin Drip  VTE Mechanical Prophylaxis: sequential compression device  Invasive lines and devices: Invasive Devices     Central Venous Catheter Line            CVC Central Lines 02/04/19 Triple Left Internal jugular less than 1 day          Hemodialysis Catheter            Permanent HD Catheter  less than 1 day          Drain            NG/OG/Enteral Tube Orogastric 3 days          Airway            ETT  Cuffed 7 5 mm 12 days                     Portions of the record may have been created with voice recognition software  Occasional wrong word or "sound a like" substitutions may have occurred due to the inherent limitations of voice recognition software  Read the chart carefully and recognize, using context, where substitutions have occurred      Allan Mayers MD

## 2019-02-05 NOTE — PHYSICAL THERAPY NOTE
Physical Therapy Evaluation     Patient Name: Yulissa Mayers    GFOVX'Q Date: 2/5/2019     Problem List  Patient Active Problem List   Diagnosis    Aortic stenosis, mild    Essential hypertension    Hyperlipidemia    Left bundle branch block    Increased BMI    Murmur    Osteoarthritis of both knees    Pericardial disease    Sciatica    Age-related cataract of right eye    Venous insufficiency    Bilateral leg edema    [facility]STEMI [facility]non-ST elevated myocardial infarction) [facility]    PATRICK (acute kidney injury) ([facility]    Stress-induced cardiomyopathy    Acute respiratory failure with hypoxia ([facility]    History of aortic valve replacement with bioprosthetic valve    Atrial fibrillation (HCC)    Cardiogenic shock (HCC)    Septic shock (HCC)    Staphylococcus aureus bacteremia    Transaminitis    Thrombocytopenia (HCC)    Anemia    Leukocytosis        Past Medical History  Past Medical History:   Diagnosis Date    Allergic rhinitis     last assessed 9/12/12    Finger fracture, right     Closed fx of the middle phalanx of the right 5th finger  last assessed 1/30/14    Hemorrhoids     last assessed 2/10/14    Hyperlipidemia     Hypertension         Past Surgical History  Past Surgical History:   Procedure Laterality Date    AORTIC VALVE REPLACEMENT  07/30/2014    [facility]ard[facility]    CARDIAC CATHETERIZATION  07/18/2014    SLB left main-normal, circumflex-normal, ramus intermedius-normal, RCA was large and dominant giving rise to the PDA & a large posterolateral branch  No disease    last assessed 8/19/14     LUPE LAST HSPTL PLACEMENT  2/4/2019    KNEE CARTILAGE SURGERY Left     medial meniscus tear     TESTICLE SURGERY      TONSILLECTOMY AND ADENOIDECTOMY      TOOTH EXTRACTION           02/05/19 1300   Note Type   Note type Eval only   Pain Assessment   Pain Assessment FLACC   Pain Score No Pain  (shakeshead "no" )   Pain Rating: FLACC (Rest) - Face 0   Pain Rating: FLACC (Rest) - Legs 0   Pain Rating: FLACC (Rest) - Activity 0   Pain Rating: FLACC (Rest) - Cry 0   Pain Rating: FLACC (Rest) - Consolability 0   Score: FLACC (Rest) 0   Pain Rating: FLACC (Activity) - Face 0   Pain Rating: FLACC (Activity) - Legs 0   Pain Rating: FLACC (Activity) - Activity 0   Pain Rating: FLACC (Activity) - Cry 0   Pain Rating: FLACC (Activity) - Consolability 0   Score: FLACC (Activity) 0   Home Living   Type of Home House   Home Layout Multi-level  (5 BRENT)   Home Equipment Walker   Additional Comments info taken from CM notes as pt vented and unable to provide    Prior Function   Level of Kin[place] Independent with ADLs and functional mobility  (A w/ donning boots and socks by spouse )   Lives With Spouse  (and MIL- )   Receives Help From Family   ADL Assistance (A w/ LB dressing bathing 2* obesity)   IADLs Independent  (drives and works FT-  )   Vocational Full time employment   Comments occasional use of RW at home- works FT as a  and drives (from [employer] notes[employer] pt unable to provide    Restrictions/Precautions   Other Precautions Cognitive; Bed Alarm; Restraints;Aspiration;Multiple lines;Telemetry; Fall Risk;Pain  (obesity- risk for skin compromise; vent; PEG)   General   Additional Pertinent History cleared for particiaption in PT/OT- vented- intermittantly off sedation    Family/Caregiver Present No   Cognition   Orientation Level Oriented to person;Oriented to place;Oriented to time   Following Commands Follows one step commands with increased time or repetition   Comments pt gives thumbs up on B/L hands and is moving B/L toes/ ankles and B/L hands and elbows on command  -    RUE Assessment   RUE Assessment X  (elbow/ 2-2/5- 1/5 in shoulder- profound weakn proximally)   LUE Assessment   LUE Assessment X  (elbow/ 2-2/5- 1/5 in shoulder- profound weakn proximally)   RLE Assessment   RLE Assessment X   Strength RLE RLE Overall Strength 2-/5  (knee/ ankles- hip limited by body habitus)   LLE Assessment   LLE Assessment X   Strength LLE   LLE Overall Strength 2-/5  (knee/ ankles- hip limited by body habitus)   Coordination   Movements are Fluid and Coordinated 0   Sensation (appears intact- pt shakes head yes" to lt touch B/L LE's )   Bed Mobility   Rolling R 1  Dependent  (x3)   Rolling L 1  Dependent  (x3)   Additional Comments atempt at long sit in be requiring DEP A x3 and use of flat sheet assist- pt attempts to grab at rail w/ L UE- however unable 2* weakness and fatigue- VSS stable on vent throughout- - pt w/ eyes open- tracks PT- cignificant fatigue post attempt - further attempts terminated  Transfers   Sit to Stand Unable to assess   Balance   Static Sitting Zero  (attempted long sit)   Endurance Deficit   Endurance Deficit Yes   Activity Tolerance   Activity Tolerance Patient limited by fatigue;Treatment limited secondary to medical complications (Comment); Other (Comment)  (vent; lines; PEG; morbid obesity)   Nurse Made Aware yes- Tori   Assessment   Prognosis Guarded  (medical status)   Problem List Decreased strength;Decreased range of motion;Decreased endurance; Impaired balance;Decreased mobility; Decreased coordination;Decreased cognition; Impaired judgement;Decreased safety awareness; Obesity; Decreased skin integrity   Assessment Pt is 60 y o  male seen for PT evaluation s/p admit to [facility] on 1/24/2019  Pt was transferred from [facility] for higher level of care  Pt where he initially presented w/ fevers and back neck redness; confusion and dyspnea- spouse called EMS  Current dx/ problem list includes: cardiogenic shock; NSTEMI; PATRICK; cardiomyopathy; acute respiratory failure w/ hypoxia; afib; sepsis; thrombocytopenia; anemia; and leukocytosis  PT now consulted for assessment of mobility and d/c needs- slow to wean off vent- cleared for act as tolerated     Comorbidities affecting pt's physical performance at time of assessment listed above and significant for morbid obesity- OA of B/L knees; cataracts; HTN; AS; PAD- pt currently being managed on vent/ not sedated and w/ orders for act as tolerated- cleared for initiation of therapies Personal factors affecting pt at time of IE include: steps to enter environment, limited home support (spouse unable to physically assist pt),  inability to perform IADLs, inability to perform ADLs, inability to ambulate household distances, limited insight into impairments Prior to admission, pt was living at home w/ spouse  And MIL in a [facility] w/ 5 BRENT and main bed and bath on 2nd floor  Pt was  working FT as as  and driving- would occasionally use RW for knee pain- had A w/ LB dressing 2* obesity  (obtained from [employer]); as pt intubated and    Upon evaluation, pt currently is intubated; opens eyes to name and nods yes and no to questioning appropriately- moves all 4 extremities on command w/ delay and is able to give thumbs up on L and R- pt is extremely weak proximally and required DEP Ax3 for attempt at long sit- fatigues quickly and closes eyes/ lethargic by conclusion of minimal activity and AAROM of all limbs- VSS throughout on vent- pt reposition for comfort post session w/ DEP A x3 and YUDITH Randle updated- PT tos ee for progression as able and attempt at progression to EOB when more awake and alert- Pt will require extensive rehab on d/c prior to home  Pt presents functioning significantly  below baseline and currently w/ overall mobility deficits 2* to: vented; multiple lines and management in MICU; morbid obesity; LE wounds;  decreased LE strength/AROM; limited flexibility;   Severe weakness/ deconditioning; decreased endurance; decreased activity tolerance; decreased coordination; limited insight into current deficitsPlease find additional objective findings from PT assessment regarding body systems outlined above ) Pt   Goals   Patient Goals none expressed- vented    STG Expiration Date 02/19/19   Short Term Goal #1 In 14 days pt will perform rolling and repositioning in bed w/ modA x2; pull to  long sit in bed w/ modA x2; increase LE strength 1/2 grade for increased indep w/ functional mobility; PT tos ee for assessment of supine<>sit and updated STG as appropriate  Treatment Day 0   Plan   Treatment/Interventions LE strengthening/ROM; Therapeutic exercise; Endurance training;Patient/family training;Cognitive reorientation;Equipment eval/education; Bed mobility;Spoke to advanced practitioner;Spoke to case management;Spoke to nursing   PT Frequency (3-5x/wk )   Recommendation   Recommendation Post acute IP rehab   Equipment Recommended (TBD- may benefit from [facility] bed)   PT - OK to Discharge No   Modified Loup Scale   Modified Loup Scale 5   Barthel Index   Feeding 0   Bathing 0   Grooming Score 0   Dressing Score 0   Bladder Score 0   Bowels Score 0   Toilet Use Score 0   Transfers (Bed/Chair) Score 0   Mobility (Level Surface) Score 0   Stairs Score 0   Barthel Index Score 0     John Search, PT

## 2019-02-05 NOTE — PROGRESS NOTES
Progress Note - Infectious Disease   Jenifer Mon 60 y o  male MRN: [id number]  Unit/Bed#: MICU 11 Encounter: [id number]      Impression/Plan:  1  Severe sepsis/septic shock   Fever, tachycardia, leukocytosis, hypotension   Also with component of cardiogenic shock  Likely precipitated by MSSA bacteremia   No other clear evidence of acute infection identified   Fevers, procalcitonin improved   Leukocytosis remains persistent, but patient was also on stress dose steroids  Otherwise patient clinically much improved with clearance of bacteremia   Remains off of vasopressors   No fevers      -antibiotic plan as below  -monitor temperatures and hemodynamics  -check CBC in a m   -supportive care as per ICU     2  MSSA bacteremia   Strong possibility of endocarditis in the setting of bioprosthetic AVR   CHON with no evidence of valvular vegetations   Initial portal of entry may have been related to multiple upper back wounds, which appear scabbed over with no overt evidence of acute infection on exam   CT chest/abdomen/pelvis with no other evidence of acute infection   Possible developing pneumonia as sputum culture was positive for MSSA   Bacteremia has been prolonged but fortunately, most recent blood cultures negative      -continue with renally dosed IV cefazolin   -will require prolonged course of IV antibiotic of likely 6 weeks in the setting of prolonged bacteremia and bioprosthetic AVR, which can be dosed with hemodialysis if he remains on it; through 3/10     3  History of bioprosthetic AVR   Secondary to degenerative AS   Placed in July 2014  Reyna Barrera no evidence of endocarditis      4  Acute kidney injury   Likely ischemic/septic ATN  Patient remains on hemodialysis  He is status post PermCath placement   -antibiotics dosed for renal dysfunction  -ongoing follow-up by Nephrology  -will potentially dose antibiotics with hemodialysis at discharge      5   Acute hypoxic respiratory failure   Likely in the setting of septic shock as well as acute systolic cardiomyopathy/volume overload   Recent CT chest with no evidence to suggest pulmonary infection   Most recent chest x-ray continues to suggest significant volume overload      -surgery consult for trach and PEG  -monitor secretions  -monitor O2 requirements  -ongoing supportive care as per ICU        Above plan discussed in detail with critical care resident  ID consult service will continue to follow  Antibiotics:  Ancef    24 hour events:  Patient is now s/p Permcath placement  He is afebrile  WBC at 10 2  No new culture data  CXR noted today  Patient's other vitals stable  Continues on IHD    Subjective:  Patient remains intubated and on sedation  He does not interact at all on exam   Discussed with ICU resident and patient has had no significant events overnight and remains on hemodialysis  Surgery has been consulted for trach and PEG  Objective:  Vitals:  Temp:  [98 5 °F (36 9 °C)-100 1 °F (37 8 °C)] 99 1 °F (37 3 °C)  HR:  [] 72  Resp:  [18-29] 22  BP: ()/() 92/60  SpO2:  [80 %-100 %] 99 %  Temp (24hrs), Av 4 °F (37 4 °C), Min:98 5 °F (36 9 °C), Max:100 1 °F (37 8 °C)  Current: Temperature: 99 1 °F (37 3 °C)    Physical Exam:   General Appearance:  Patient does not interact on exam   He remains intubated and on sedation  Throat: ET tube in place with thin secretions  Lungs:   Vented breath sounds heard throughout; no wheezes, rhonchi or rales; respirations unlabored on vent   Heart:  RRR; no murmur, rub or gallop though distant heart sounds   Abdomen:   Soft, non-tender, non-distended, minimal bowel sounds  Obese abdomen     Extremities: No clubbing, cyanosis; ongoing edema in the upper lower extremities bilaterally  Skin: No new rashes or lesions  No new draining wounds noted         Labs, Imaging, & Other studies:   All pertinent labs and imaging studies were personally reviewed    Results from last 7 days  Lab Units 02/05/19  0541 02/04/19  0514 02/03/19  1208   WBC Thousand/uL 10 20* 15 64* 15 89*   HEMOGLOBIN g/dL 7 7* 8 2* 8 5*   PLATELETS Thousands/uL 145* 140* 131*       Results from last 7 days  Lab Units 02/05/19  0541  02/01/19  0605   POTASSIUM mmol/L 4 6  < > 4 5   CHLORIDE mmol/L 100  < > 104   CO2 mmol/L 25  < > 23   BUN mg/dL 58*  < > 35*   CREATININE mg/dL 4 06*  < > 2 13*   EGFR ml/min/1 73sq m 15  < > 33   CALCIUM mg/dL 7 8*  < > 8 4   AST U/L  --   --  80*   ALT U/L  --   --  19   ALK PHOS U/L  --   --  108   < > = values in this interval not displayed

## 2019-02-05 NOTE — RESPIRATORY THERAPY NOTE
RT Ventilator Management Note  Delgado Amador 60 y o  male MRN: [id number]  Unit/Bed#: [facility] 11 Encounter: [id number]      Daily Screen       2/3/2019 0921 2/4/2019 0832          Patient safety screen outcome[de-identified] Passed Passed      Not Ready for Weaning due to[de-identified] - Going on Transport intubated      Spont breathing trial % for 30 min: Yes -      Spont breathing trial outcome[de-identified] Failed -      Spont breathing trial reason failed: RR > 35 bpm;Tidal volume < 4ml/Kg of ideal body weight or VE <5 or  >15 LPM -      Previous settings resumed: Yes -      Name of Medical Team Notified[de-identified] rn aware -              Physical Exam:   Assessment Type: (P) Assess only  General Appearance: (P) Sedated  Respiratory Pattern: (P) Assisted  Chest Assessment: (P) Chest expansion symmetrical  Bilateral Breath Sounds: (P) Diminished      Resp Comments: (P) Pt remained oo a/c settings without incident throughout the night

## 2019-02-06 ENCOUNTER — APPOINTMENT (INPATIENT)
Dept: DIALYSIS | Facility: HOSPITAL | Age: 59
DRG: 871 | End: 2019-02-06
Attending: INTERNAL MEDICINE
Payer: COMMERCIAL

## 2019-02-06 LAB
ANION GAP SERPL CALCULATED.3IONS-SCNC: 12 MMOL/L (ref 4–13)
ANISOCYTOSIS BLD QL SMEAR: PRESENT
APTT PPP: 78 SECONDS (ref 26–38)
BASOPHILS # BLD MANUAL: 0 THOUSAND/UL (ref 0–0.1)
BASOPHILS NFR MAR MANUAL: 0 % (ref 0–1)
BUN SERPL-MCNC: 79 MG/DL (ref 5–25)
CA-I BLD-SCNC: 1.04 MMOL/L (ref 1.12–1.32)
CALCIUM SERPL-MCNC: 8 MG/DL (ref 8.3–10.1)
CHLORIDE SERPL-SCNC: 98 MMOL/L (ref 100–108)
CO2 SERPL-SCNC: 23 MMOL/L (ref 21–32)
CREAT SERPL-MCNC: 5.33 MG/DL (ref 0.6–1.3)
EOSINOPHIL # BLD MANUAL: 0 THOUSAND/UL (ref 0–0.4)
EOSINOPHIL NFR BLD MANUAL: 0 % (ref 0–6)
ERYTHROCYTE [DISTWIDTH] IN BLOOD BY AUTOMATED COUNT: 18 % (ref 11.6–15.1)
GFR SERPL CREATININE-BSD FRML MDRD: 11 ML/MIN/1.73SQ M
GIANT PLATELETS BLD QL SMEAR: PRESENT
GLUCOSE SERPL-MCNC: 113 MG/DL (ref 65–140)
GLUCOSE SERPL-MCNC: 131 MG/DL (ref 65–140)
GLUCOSE SERPL-MCNC: 138 MG/DL (ref 65–140)
GLUCOSE SERPL-MCNC: 141 MG/DL (ref 65–140)
GLUCOSE SERPL-MCNC: 142 MG/DL (ref 65–140)
GLUCOSE SERPL-MCNC: 145 MG/DL (ref 65–140)
HCT VFR BLD AUTO: 24.6 % (ref 36.5–49.3)
HGB BLD-MCNC: 7.8 G/DL (ref 12–17)
LYMPHOCYTES # BLD AUTO: 0.66 THOUSAND/UL (ref 0.6–4.47)
LYMPHOCYTES # BLD AUTO: 6 % (ref 14–44)
MAGNESIUM SERPL-MCNC: 2.5 MG/DL (ref 1.6–2.6)
MCH RBC QN AUTO: 27.4 PG (ref 26.8–34.3)
MCHC RBC AUTO-ENTMCNC: 31.7 G/DL (ref 31.4–37.4)
MCV RBC AUTO: 86 FL (ref 82–98)
MONOCYTES # BLD AUTO: 0.88 THOUSAND/UL (ref 0–1.22)
MONOCYTES NFR BLD: 8 % (ref 4–12)
NEUTROPHILS # BLD MANUAL: 9.51 THOUSAND/UL (ref 1.85–7.62)
NEUTS SEG NFR BLD AUTO: 86 % (ref 43–75)
NRBC BLD AUTO-RTO: 0 /100 WBCS
NT-PROBNP SERPL-MCNC: ABNORMAL PG/ML
PHOSPHATE SERPL-MCNC: 8.1 MG/DL (ref 2.7–4.5)
PLATELET # BLD AUTO: 172 THOUSANDS/UL (ref 149–390)
PLATELET BLD QL SMEAR: ADEQUATE
PMV BLD AUTO: 12.6 FL (ref 8.9–12.7)
POTASSIUM SERPL-SCNC: 4.8 MMOL/L (ref 3.5–5.3)
RBC # BLD AUTO: 2.85 MILLION/UL (ref 3.88–5.62)
SODIUM SERPL-SCNC: 133 MMOL/L (ref 136–145)
TOTAL CELLS COUNTED SPEC: 100
WBC # BLD AUTO: 11.06 THOUSAND/UL (ref 4.31–10.16)

## 2019-02-06 PROCEDURE — 85007 BL SMEAR W/DIFF WBC COUNT: CPT | Performed by: PHYSICIAN ASSISTANT

## 2019-02-06 PROCEDURE — 99232 SBSQ HOSP IP/OBS MODERATE 35: CPT | Performed by: INTERNAL MEDICINE

## 2019-02-06 PROCEDURE — 94760 N-INVAS EAR/PLS OXIMETRY 1: CPT

## 2019-02-06 PROCEDURE — 83880 ASSAY OF NATRIURETIC PEPTIDE: CPT | Performed by: FAMILY MEDICINE

## 2019-02-06 PROCEDURE — 83735 ASSAY OF MAGNESIUM: CPT | Performed by: PHYSICIAN ASSISTANT

## 2019-02-06 PROCEDURE — 85730 THROMBOPLASTIN TIME PARTIAL: CPT | Performed by: INTERNAL MEDICINE

## 2019-02-06 PROCEDURE — 94660 CPAP INITIATION&MGMT: CPT

## 2019-02-06 PROCEDURE — 82948 REAGENT STRIP/BLOOD GLUCOSE: CPT

## 2019-02-06 PROCEDURE — 84100 ASSAY OF PHOSPHORUS: CPT | Performed by: FAMILY MEDICINE

## 2019-02-06 PROCEDURE — 5A1D70Z PERFORMANCE OF URINARY FILTRATION, INTERMITTENT, LESS THAN 6 HOURS PER DAY: ICD-10-PCS | Performed by: INTERNAL MEDICINE

## 2019-02-06 PROCEDURE — 82330 ASSAY OF CALCIUM: CPT | Performed by: FAMILY MEDICINE

## 2019-02-06 PROCEDURE — 85027 COMPLETE CBC AUTOMATED: CPT | Performed by: PHYSICIAN ASSISTANT

## 2019-02-06 PROCEDURE — 94640 AIRWAY INHALATION TREATMENT: CPT

## 2019-02-06 PROCEDURE — 90935 HEMODIALYSIS ONE EVALUATION: CPT | Performed by: INTERNAL MEDICINE

## 2019-02-06 PROCEDURE — 99231 SBSQ HOSP IP/OBS SF/LOW 25: CPT | Performed by: SURGERY

## 2019-02-06 PROCEDURE — 94003 VENT MGMT INPAT SUBQ DAY: CPT

## 2019-02-06 PROCEDURE — 99291 CRITICAL CARE FIRST HOUR: CPT | Performed by: INTERNAL MEDICINE

## 2019-02-06 PROCEDURE — 80048 BASIC METABOLIC PNL TOTAL CA: CPT | Performed by: PHYSICIAN ASSISTANT

## 2019-02-06 RX ORDER — OLANZAPINE 10 MG/1
5 INJECTION, POWDER, LYOPHILIZED, FOR SOLUTION INTRAMUSCULAR ONCE
Status: COMPLETED | OUTPATIENT
Start: 2019-02-06 | End: 2019-02-06

## 2019-02-06 RX ORDER — QUETIAPINE FUMARATE 25 MG/1
25 TABLET, FILM COATED ORAL
Status: DISCONTINUED | OUTPATIENT
Start: 2019-02-06 | End: 2019-02-21

## 2019-02-06 RX ADMIN — LEVALBUTEROL 1.25 MG: 1.25 SOLUTION, CONCENTRATE RESPIRATORY (INHALATION) at 01:51

## 2019-02-06 RX ADMIN — METOPROLOL TARTRATE 12.5 MG: 25 TABLET ORAL at 08:11

## 2019-02-06 RX ADMIN — QUETIAPINE FUMARATE 25 MG: 25 TABLET ORAL at 08:11

## 2019-02-06 RX ADMIN — IPRATROPIUM BROMIDE 0.5 MG: 0.5 SOLUTION RESPIRATORY (INHALATION) at 13:27

## 2019-02-06 RX ADMIN — CHLORHEXIDINE GLUCONATE 0.12% ORAL RINSE 15 ML: 1.2 LIQUID ORAL at 21:09

## 2019-02-06 RX ADMIN — WATER 2.1 ML: 1 INJECTION INTRAMUSCULAR; INTRAVENOUS; SUBCUTANEOUS at 12:45

## 2019-02-06 RX ADMIN — HEPARIN SODIUM AND DEXTROSE 27.1 UNITS/KG/HR: 10000; 5 INJECTION INTRAVENOUS at 11:40

## 2019-02-06 RX ADMIN — DEXMEDETOMIDINE 0.4 MCG/KG/HR: 100 INJECTION, SOLUTION, CONCENTRATE INTRAVENOUS at 13:30

## 2019-02-06 RX ADMIN — POLYETHYLENE GLYCOL 3350 17 G: 17 POWDER, FOR SOLUTION ORAL at 08:11

## 2019-02-06 RX ADMIN — HEPARIN SODIUM AND DEXTROSE 27.1 UNITS/KG/HR: 10000; 5 INJECTION INTRAVENOUS at 23:15

## 2019-02-06 RX ADMIN — MIDODRINE HYDROCHLORIDE 5 MG: 5 TABLET ORAL at 06:31

## 2019-02-06 RX ADMIN — CHLORHEXIDINE GLUCONATE 0.12% ORAL RINSE 15 ML: 1.2 LIQUID ORAL at 08:12

## 2019-02-06 RX ADMIN — NYSTATIN: 100000 POWDER TOPICAL at 21:09

## 2019-02-06 RX ADMIN — AMIODARONE HYDROCHLORIDE 200 MG: 200 TABLET ORAL at 06:31

## 2019-02-06 RX ADMIN — HEPARIN SODIUM AND DEXTROSE 27.1 UNITS/KG/HR: 10000; 5 INJECTION INTRAVENOUS at 00:39

## 2019-02-06 RX ADMIN — DEXMEDETOMIDINE 0.4 MCG/KG/HR: 100 INJECTION, SOLUTION, CONCENTRATE INTRAVENOUS at 04:01

## 2019-02-06 RX ADMIN — LEVALBUTEROL 1.25 MG: 1.25 SOLUTION, CONCENTRATE RESPIRATORY (INHALATION) at 19:31

## 2019-02-06 RX ADMIN — OLANZAPINE 5 MG: 10 INJECTION, POWDER, FOR SOLUTION INTRAMUSCULAR at 12:38

## 2019-02-06 RX ADMIN — METOPROLOL TARTRATE 2.5 MG: 1 INJECTION, SOLUTION INTRAVENOUS at 12:21

## 2019-02-06 RX ADMIN — DEXMEDETOMIDINE 0.4 MCG/KG/HR: 100 INJECTION, SOLUTION, CONCENTRATE INTRAVENOUS at 21:51

## 2019-02-06 RX ADMIN — CALCIUM ACETATE 1334 MG: 667 CAPSULE ORAL at 08:11

## 2019-02-06 RX ADMIN — IPRATROPIUM BROMIDE 0.5 MG: 0.5 SOLUTION RESPIRATORY (INHALATION) at 01:51

## 2019-02-06 RX ADMIN — DEXMEDETOMIDINE 0.6 MCG/KG/HR: 100 INJECTION, SOLUTION, CONCENTRATE INTRAVENOUS at 08:26

## 2019-02-06 RX ADMIN — LEVALBUTEROL 1.25 MG: 1.25 SOLUTION, CONCENTRATE RESPIRATORY (INHALATION) at 07:31

## 2019-02-06 RX ADMIN — NYSTATIN: 100000 POWDER TOPICAL at 08:11

## 2019-02-06 RX ADMIN — DEXMEDETOMIDINE 0.5 MCG/KG/HR: 100 INJECTION, SOLUTION, CONCENTRATE INTRAVENOUS at 17:24

## 2019-02-06 RX ADMIN — IPRATROPIUM BROMIDE 0.5 MG: 0.5 SOLUTION RESPIRATORY (INHALATION) at 19:31

## 2019-02-06 RX ADMIN — LEVALBUTEROL 1.25 MG: 1.25 SOLUTION, CONCENTRATE RESPIRATORY (INHALATION) at 13:27

## 2019-02-06 RX ADMIN — CEFAZOLIN SODIUM 1000 MG: 10 INJECTION, POWDER, FOR SOLUTION INTRAVENOUS at 17:24

## 2019-02-06 RX ADMIN — IPRATROPIUM BROMIDE 0.5 MG: 0.5 SOLUTION RESPIRATORY (INHALATION) at 07:31

## 2019-02-06 RX ADMIN — PREDNISONE 40 MG: 20 TABLET ORAL at 08:12

## 2019-02-06 NOTE — OCCUPATIONAL THERAPY NOTE
OT CANCEL NOTE    OT orders received  Chart reviewed  Pt is currently undergoing dialysis  Will hold initial OT evaluation  Will continue to follow pt on caseload and see pt when medically stable and as clinically appropriate      Radha LIZARRAGA, OTR/L

## 2019-02-06 NOTE — HEMODIALYSIS
Patient received 4 hr HD tx today  HR elevated to 140's - 150's at beginning of tx  Patient reinfused Dr Magaly Nguyễn notified critical care team at bedside  Pt was given meds per critical care, HR decreased  Tx resumed per Dr Magaly Nguyễn, UF goal decreased  Pt removed 2 kg

## 2019-02-06 NOTE — PROGRESS NOTES
Pt denied feeling short of breath, having pain, being uncomfortable or having chest pain; did nod "yes" when asked if he was upset and had clenched eyes; Precedex restarted and Zyprexa given

## 2019-02-06 NOTE — SOCIAL WORK
CM spoke with pt wife, Silvia Sheppard, and informed her that [facility] paperwork was completed and faxed  CM briefly discussed dcp with Silvia Sheppard reports that she is aware if pt does not do well extubated he will require a tracheostomy  CM briefly discussed options and will discuss further pending pt needs  Silvia Sheppard aware either way pt will require rehab and preference would be for pt to remain close to home  CM continues to follow

## 2019-02-06 NOTE — PLAN OF CARE
CARDIOVASCULAR - ADULT     Absence of cardiac dysrhythmias or at baseline rhythm Not Progressing        SAFETY ADULT     Maintain or return to baseline ADL function Not Progressing     Maintain or return mobility status to optimal level Not Progressing          CARDIOVASCULAR - ADULT     Maintains optimal cardiac output and hemodynamic stability Progressing        DISCHARGE PLANNING     Discharge to home or other facility with appropriate resources Progressing        DISCHARGE PLANNING - CARE MANAGEMENT     Discharge to post-acute care or home with appropriate resources Progressing        GENITOURINARY - ADULT     Maintains or returns to baseline urinary function Progressing     Absence of urinary retention Progressing        INFECTION - ADULT     Absence or prevention of progression during hospitalization Progressing     Absence of fever/infection during neutropenic period Progressing        Knowledge Deficit     Patient/family/caregiver demonstrates understanding of disease process, treatment plan, medications, and discharge instructions Progressing        METABOLIC, FLUID AND ELECTROLYTES - ADULT     Electrolytes maintained within normal limits Progressing     Fluid balance maintained Progressing        Nutrition/Hydration-ADULT     Nutrient/Hydration intake appropriate for improving, restoring or maintaining nutritional needs Progressing        PAIN - ADULT     Verbalizes/displays adequate comfort level or baseline comfort level Progressing        Potential for Falls     Patient will remain free of falls Progressing        Prexisting or High Potential for Compromised Skin Integrity     Skin integrity is maintained or improved Progressing        SAFETY ADULT     Patient will remain free of falls Progressing        SAFETY,RESTRAINT: NV/NON-SELF DESTRUCTIVE BEHAVIOR     Remains free of harm/injury (restraint for non violent/non self-detsructive behavior) Progressing     Returns to optimal restraint-free functioning Progressing

## 2019-02-06 NOTE — PROGRESS NOTES
NEPHROLOGY PROGRESS NOTE    Wilberto Suggs 60 y o  male MRN: [id number]  Unit/Bed#: MICU 11 Encounter: [id number]  Reason for Consult:  Acute renal failure    The patient is relatively stable  Stable hemodynamically  He has been extubated this morning  ASSESSMENT/PLAN:  1  Renal    The patient has acute renal failure and presently is oliguric with dialysis dependence  Dialysis has been done  and he is scheduled undergo dialysis today as outlined below  Will try to reduce volume as much as tolerated  HEMODIALYSIS PROCEDURE NOTE  The patient was seen and examined on hemodialysis  Time: 4 hours  Sodium: 138 Blood flow: 350   Dialyzer: F160 Potassium: 3 Dialysate flow: 800   Access: catheter Bicarbonate: 35 Ultrafiltration goal: 3        2  Cardiac  Status post cardiogenic shock  Cardiology following  3  Bacteremia  SUBJECTIVE:  ROS  Patient was seen when he was still intubated by myself so no complete review of system available  OBJECTIVE:  Current Weight: Weight - Scale: (!) 167 kg (367 lb 8 1 oz)  Vitals:Temp (24hrs), Av 7 °F (37 1 °C), Min:97 9 °F (36 6 °C), Max:99 6 °F (37 6 °C)  Current: Temperature: 98 °F (36 7 °C)   Blood pressure 115/63, pulse 100, temperature 98 °F (36 7 °C), temperature source Oral, resp  rate (!) 24, height 5' 9" (1 753 m), weight (!) 167 kg (367 lb 8 1 oz), SpO2 96 %  , Body mass index is 54 27 kg/m²  Intake/Output Summary (Last 24 hours) at 19 0945  Last data filed at 19 0850   Gross per 24 hour   Intake          2093 25 ml   Output                0 ml   Net          2093 25 ml       Physical Exam: /63   Pulse 100   Temp 98 °F (36 7 °C) (Oral)   Resp (!) 24   Ht 5' 9" (1 753 m)   Wt (!) 167 kg (367 lb 8 1 oz)   SpO2 96%   BMI 54 27 kg/m²   Physical Exam   Constitutional: No distress  Neck: No JVD present  Cardiovascular: Normal rate  Exam reveals no friction rub      Irregular rhythm   Pulmonary/Chest: No respiratory distress  Abdominal: Soft  Bowel sounds are normal  He exhibits no distension         Medications:    Current Facility-Administered Medications:     acetaminophen (TYLENOL) oral suspension 650 mg, 650 mg, Oral, Q6H PRN, Horacio Benton MD, 650 mg at 01/24/19 1946    albuterol inhalation solution 2 5 mg, 2 5 mg, Nebulization, Q4H PRN, Vita Raines PA-C    amiodarone tablet 200 mg, 200 mg, Oral, Daily With Breakfast, Vita Raines PA-C, 200 mg at 02/06/19 0631    bisacodyl (DULCOLAX) rectal suppository 10 mg, 10 mg, Rectal, Daily PRN, Vita Raines PA-C    calcium acetate (PHOSLO) capsule 1,334 mg, 1,334 mg, Oral, BID, Marce Pallas, MD, 1,334 mg at 02/06/19 1362    ceFAZolin (ANCEF) 1 g in sodium chloride 0 9% 50 ml IVPB, 1,000 mg, Intravenous, Q24H, Grabiel Niño MD, Stopped at 02/05/19 1921    chlorhexidine (PERIDEX) 0 12 % oral rinse 15 mL, 15 mL, [employer], Q12H [facility], Vita Raines PA-C, 15 mL at 02/06/19 8649    dexmedetomidine (PRECEDEX) 200 mcg in sodium chloride 0 9 % 50 mL infusion, 0 1-0 7 mcg/kg/hr, Intravenous, Titrated, Horacio Benton MD, Last Rate: 18 1 mL/hr at 02/06/19 0936, 0 5 mcg/kg/hr at 02/06/19 0936    heparin (porcine) 25,000 units in 250 mL infusion (premix), 3-30 Units/kg/hr (Order-Specific), Intravenous, Titrated, Leanord Councilman, PA-C, Last Rate: 24 4 mL/hr at 02/06/19 0039, 27 1 Units/kg/hr at 02/06/19 0039    HYDROmorphone (DILAUDID) injection 1 mg, 1 mg, Intravenous, Q3H PRN, Lidya Martinez, 1 mg at 02/04/19 1819    insulin lispro (HumaLOG) 100 units/mL subcutaneous injection 5-25 Units, 5-25 Units, Subcutaneous, Q6H [facility], 5 Units at 02/05/19 1744 **AND** Fingerstick Glucose (POCT), , , Q6H, Vita Raines PA-C    ipratropium (ATROVENT) 0 02 % inhalation solution 0 5 mg, 0 5 mg, Nebulization, Q6H, Angelica Doll PA-C, 0 5 mg at 02/06/19 0731    levalbuterol (XOPENEX) inhalation solution 1 25 mg, 1 25 mg, Nebulization, Q6H, Eva Meléndez HERBERT Parrish, 1 25 mg at 02/06/19 0731    metoprolol (LOPRESSOR) injection 2 5 mg, 2 5 mg, Intravenous, Q6H PRN, Epifanio Lawson MD    metoprolol tartrate (LOPRESSOR) partial tablet 12 5 mg, 12 5 mg, Oral, Q12H [address], Trude Hashimoto, DO, 12 5 mg at 02/06/19 0811    midodrine (PROAMATINE) tablet 5 mg, 5 mg, Oral, TID AC, Dwight Pearce, KENIANP, 5 mg at 02/06/19 0631    nystatin (MYCOSTATIN) powder, , Topical, BID, Yany Paul PA-C    OLANZapine (ZyPREXA) IM injection 5 mg, 5 mg, Intramuscular, Once, Toña Lucio PA-C    oxyCODONE (ROXICODONE) oral solution 10 mg, 10 mg, Oral, Q4H PRN, Lidya Shawkh, 10 mg at 02/04/19 0821    polyethylene glycol (MIRALAX) packet 17 g, 17 g, Oral, Daily, KATHY Mathias, 17 g at 02/06/19 4724    polyvinyl alcohol (LIQUIFILM TEARS) 1 4 % ophthalmic solution 1 drop, 1 drop, Both Eyes, Q3H PRN, Toña Lucio PA-C, 1 drop at 01/31/19 1649    predniSONE tablet 40 mg, 40 mg, Oral, Daily, [employer], PA-C, 40 mg at 02/06/19 3916    QUEtiapine (SEROquel) tablet 25 mg, 25 mg, Oral, HS, Toña Lucio PA-C    senna 8 8 mg/5 mL oral syrup 17 6 mg, 17 6 mg, Oral, BID, KATHY Wade, 17 6 mg at 02/05/19 2019    Laboratory Results:  Lab Results   Component Value Date    WBC 11 06 (H) 02/06/2019    HGB 7 8 (L) 02/06/2019    HCT 24 6 (L) 02/06/2019    MCV 86 02/06/2019     02/06/2019     Lab Results   Component Value Date    GLUCOSE 92 04/09/2015    CALCIUM 8 0 (L) 02/06/2019     04/09/2015    K 4 8 02/06/2019    CO2 23 02/06/2019    CL 98 (L) 02/06/2019    BUN 79 (H) 02/06/2019    CREATININE 5 33 (H) 02/06/2019     Lab Results   Component Value Date    CALCIUM 8 0 (L) 02/06/2019    PHOS 8 1 (H) 02/06/2019     No results found for: LABPROT

## 2019-02-06 NOTE — PHYSICAL THERAPY NOTE
PT Cancellation    Pt chart reviewed  Attempted to see  Pt is currently receiving bedside HD  Will follow and see as able      Frankie Moreno, PT, DPT

## 2019-02-06 NOTE — PROGRESS NOTES
Progress Note - Infectious Disease   Juju Thomas 60 y o  male MRN: [id number]  [facility] En[facility]: [id number]      Impression/Plan:  1  Severe sepsis/septic shock   Fever, tachycardia, leukocytosis, hypotension   Also with component of cardiogenic shock  Likely precipitated by MSSA bacteremia   No other clear evidence of acute infection identified   Fevers, procalcitonin improved   Leukocytosis remains persistent, but patient was also on stress dose steroids  Otherwise patient clinically much improved with clearance of bacteremia  No fevers      -antibiotic plan as below  -monitor temperatures and hemodynamics  -check CBC in a m   -supportive care as per ICU      2  MSSA bacteremia   Strong possibility of endocarditis in the setting of bioprosthetic AVR   CHON with no evidence of valvular vegetations   Initial portal of entry may have been related to multiple upper back wounds, which appear scabbed over with no overt evidence of acute infection on exam   CT chest/abdomen/pelvis with no other evidence of acute infection   Possible from pneumonia as sputum culture was positive for MSSA   Bacteremia eventually cleared     -continue with renally dosed IV cefazolin   -will require prolonged course of IV antibiotic of likely 6 weeks in the setting of prolonged bacteremia and bioprosthetic AVR, which can be dosed with hemodialysis if he remains on it; through 3/10     3  History of bioprosthetic AVR   Secondary to degenerative AS   Placed in July 2014  Shonda De Leon no evidence of endocarditis      4  Acute kidney injury   Likely ischemic/septic ATN  Patient remains on hemodialysis  He is status post PermCath placement   -antibiotics dosed for renal dysfunction  -ongoing follow-up by Nephrology  -antibiotics can be dosed with hemodialysis if he remains on it at discharge     5  Acute hypoxic respiratory failure   Likely in the setting of septic shock as well as acute systolic cardiomyopathy/volume overload   Recent CT chest with no evidence to suggest pulmonary infection   Most recent chest x-ray continues to suggest significant volume overload  He continues to be followed closely by surgery for possible trach and PEG     -monitor secretions  -monitor O2 requirements  -ongoing supportive care as per ICU      Above plan discussed in detail with critical care resident      ID consult service will continue to follow  Antibiotics:  Ancef    24 hour events:  No acute events noted overnight on chart review  Patient plan for trach and PEG tomorrow  Patient currently afebrile  White blood cell count 11  No new culture data  Images over    Subjective:  Patient evaluate on BiPAP this morning  He will open his eyes briefly and then fall back asleep  Does not participate in exam are answer many questions at this time  Objective:  Vitals:  Temp:  [97 9 °F (36 6 °C)-99 6 °F (37 6 °C)] 97 9 °F (36 6 °C)  HR:  [] 74  Resp:  [19-36] 22  BP: ()/(60-99) 110/66  SpO2:  [92 %-100 %] 99 %  Temp (24hrs), Av 8 °F (37 1 °C), Min:97 9 °F (36 6 °C), Max:99 6 °F (37 6 °C)  Current: Temperature: 97 9 °F (36 6 °C)    Physical Exam:   General Appearance:  Patient is on BiPAP and does not appear in significant distress but is minimally interactive  Throat: Unable to examine while the patient is on BiPAP  Lungs:   Decreased breath sounds throughout; no wheezes, rhonchi or rales; respirations unlabored on BiPAP   Heart:  RRR; no murmur, rub or gallop though distant heart sounds   Abdomen:   Soft, non-tender, non-distended, positive bowel sounds  Obese abdomen    Extremities: No clubbing, cyanosis; ongoing edema in the upper and lower extremities bilaterally   Skin: No new rashes or lesions  No new draining wounds noted         Labs, Imaging, & Other studies:   All pertinent labs and imaging studies were personally reviewed    Results from last 7 days  Lab Units 19  0458 19  0541 19  0514   WBC Thousand/uL 11 06* 10 20* 15 64*   HEMOGLOBIN g/dL 7 8* 7 7* 8 2*   PLATELETS Thousands/uL 172 145* 140*       Results from last 7 days  Lab Units 02/06/19  0458  02/01/19  0605   POTASSIUM mmol/L 4 8  < > 4 5   CHLORIDE mmol/L 98*  < > 104   CO2 mmol/L 23  < > 23   BUN mg/dL 79*  < > 35*   CREATININE mg/dL 5 33*  < > 2 13*   EGFR ml/min/1 73sq m 11  < > 33   CALCIUM mg/dL 8 0*  < > 8 4   AST U/L  --   --  80*   ALT U/L  --   --  19   ALK PHOS U/L  --   --  108   < > = values in this interval not displayed

## 2019-02-06 NOTE — PROGRESS NOTES
Cardiology Progress Note - Simi Osorio 60 y o  male MRN: [id number]    [facility]: [id number]      Assessment:  Principal Problem:    Cardiogenic shock ([facility] )  Active Problems:    Increased BMI    NSTEMI (non-ST elevated myocardial infarction) ([facility] )    PATRICK (acute kidney injury) ([facility] )    Stress-induced cardiomyopathy    Acute respiratory failure with hypoxia ([place] [address] )    History of aortic valve replacement with bioprosthetic valve    Atrial fibrillation (HCC)    Septic shock (HCC)    Staphylococcus aureus bacteremia    Transaminitis    Thrombocytopenia (HCC)    Anemia    Leukocytosis        Plan:  1  HFrEF- LVEF-35%, LVIDd-6 5 cm- Cardiomyopathy in the setting of Septic shock with possible component of cardiogenic shock- Biventricular failure  - Clinically warm with dependent edema  - Stable off Milrinone  - I/O-0310/2673 with net negative 254, continue volume removal with dialysis  - Echo 1/25- Upper normal LV size with LVEF-30%, Concentric hypertrophy, Dilated RV with reduced function, Dyssynergic septum  Bioprosthetic aortic valve- not very well visualized- mean gradient of 13  - His cardiomyopathy is predominantly sepsis mediated myocardial dysfunction     2  MSSSA bacteremia with possible source being pneumonia  - On Cefazolin   - Sputum with staph aureus, 1/27- blood cultures- no growth  - CHON with no evidence of endocarditis       3  Bioprosthetic AVR for severe degenerative AS- 25 mm Champagne Magna in July 2014  - Gradients across the valve in the past have been around 21, current gradients are 13- CHON- no endocarditis     4  Troponin elevation  - Troponins elevated to above 40, this is in the setting of septic shock  - EKG with no ischemic changes  - Normal cath in 2014  May need outpatient stress test    5   New onset Atrial fibrillation in the setting of sepsis  - on oral amiodarone 200 mg daily and metoprolol tartrate 12 5 mg twice daily  - anticoagulation with IV heparin- continue for now, will transition to coumadin if he remains stable in the next 24 hours       6  PATRICK- in the setting of septic shock  - on  HD    7  Microcytic anemia  - Drop in H&H in the last four days  8  Acute hypoxic respiratory failure   - Extubated this morning         Subjective:   Patient seen and examined  Extubated this morning  Awake, follows commands  Appears tired      Telemetry- Atrial fibrillation/flutter with controlled ventricular rates in the 70-80 with RVR upto 140s this morning    Objective:     Vitals: Blood pressure 115/63, pulse 100, temperature 98 °F (36 7 °C), temperature source Oral, resp  rate (!) 24, height 5' 9" (1 753 m), weight (!) 167 kg (367 lb 8 1 oz), SpO2 96 %  , Body mass index is 54 27 kg/m² ,   Orthostatic Blood Pressures      Most Recent Value   Blood Pressure  115/63 filed at 02/06/2019 0905   Patient Position - Orthostatic VS  Lying filed at 02/06/2019 0400            Intake/Output Summary (Last 24 hours) at 02/06/19 3731  Last data filed at 02/06/19 0850   Gross per 24 hour   Intake          2093 25 ml   Output                0 ml   Net          2093 25 ml         Physical Exam:    GEN: [place] extubated, follows commands  HEENT: anicteric, mucous membranes moist  NECK: no jvd, no carotid bruits, right and left central lines  HEART: Irregular rhythm, normal S1 and S2,  no murmurs, clicks, gallops or rubs   LUNGS: rhonchi in the anterior lung fields, diminished breath sounds in the left lung  ABDOMEN: normal bowel sounds, soft, no tenderness, no distention  EXTREMITIES: peripheral pulses 1+; bluish discoloration of the toes, warm feet, dependent edema of bilateral upper and lower extremities  NEURO: no focal findings   SKIN: coin shaped excoriations of the upper back      Current Facility-Administered Medications:     acetaminophen (TYLENOL) oral suspension 650 mg, 650 mg, Oral, Q6H PRN, Dilshad Ayers MD, 650 mg at 01/24/19 1946    albuterol inhalation solution 2 5 mg, 2 5 mg, Nebulization, Q4H PRN, Guzman Izquierdo PA-C    amiodarone tablet 200 mg, 200 mg, Oral, Daily With Breakfast, Guzman Izquierdo PA-C, 200 mg at 02/06/19 0631    bisacodyl (DULCOLAX) rectal suppository 10 mg, 10 mg, Rectal, Daily PRN, Guzman Izquierdo PA-C    calcium acetate (PHOSLO) capsule 1,334 mg, 1,334 mg, Oral, BID, Tru Wesley MD, 1,334 mg at 02/06/19 0811    ceFAZolin (ANCEF) 1 g in sodium chloride 0 9% 50 ml IVPB, 1,000 mg, Intravenous, Q24H, Behzad Roque MD, Stopped at 02/05/19 1921    chlorhexidine (PERIDEX) 0 12 % oral rinse 15 mL, 15 mL, [employer], Q12H [address], Guzman Izquierdo PA-C, 15 mL at 02/06/19 6188    dexmedetomidine (PRECEDEX) 200 mcg in sodium chloride 0 9 % 50 mL infusion, 0 1-0 7 mcg/kg/hr, Intravenous, Titrated, Dilshad Ayers MD, Last Rate: 21 8 mL/hr at 02/06/19 0826, 0 6 mcg/kg/hr at 02/06/19 0826    heparin (porcine) 25,000 units in 250 mL infusion (premix), 3-30 Units/kg/hr (Order-Specific), Intravenous, Titrated, Dominique Walker PA-C, Last Rate: 24 4 mL/hr at 02/06/19 0039, 27 1 Units/kg/hr at 02/06/19 0039    HYDROmorphone (DILAUDID) injection 1 mg, 1 mg, Intravenous, Q3H PRN, [facility], 1 mg at 02/04/19 1819    insulin lispro (HumaLOG) 100 units/mL subcutaneous injection 5-25 Units, 5-25 Units, Subcutaneous, Q6H [address], 5 Units at 02/05/19 1744 **AND** Fingerstick Glucose (POCT), , , Q6H, Guzman Izquierdo PA-C    ipratropium (ATROVENT) 0 02 % inhalation solution 0 5 mg, 0 5 mg, Nebulization, Q6H, Alverto Whitney PA-C, 0 5 mg at 02/06/19 0731    levalbuterol (XOPENEX) inhalation solution 1 25 mg, 1 25 mg, Nebulization, Q6H, Alverto Whitney PA-C, 1 25 mg at 02/06/19 0731    metoprolol (LOPRESSOR) injection 2 5 mg, 2 5 mg, Intravenous, Q6H PRN, Behzad Roque MD    metoprolol tartrate (LOPRESSOR) partial tablet 12 5 mg, 12 5 mg, Oral, Q12H [address], Damari Saint, DO, 12 5 mg at 02/06/19 0811    midodrine (PROAMATINE) tablet 5 mg, 5 mg, Oral, TID Altagracia Garland KATHY Milan, 5 mg at 02/06/19 0631    nystatin (MYCOSTATIN) powder, , Topical, BID, Saba Peterson PA-C    OLANZapine (ZyPREXA) IM injection 5 mg, 5 mg, Intramuscular, Once, Birtha Skiff, PA-C    oxyCODONE (ROXICODONE) oral solution 10 mg, 10 mg, Oral, Q4H PRN, Lidya Gallardohaikh, 10 mg at 02/04/19 0821    polyethylene glycol (MIRALAX) packet 17 g, 17 g, Oral, Daily, KATHY Mathias, 17 g at 02/06/19 9441    polyvinyl alcohol (LIQUIFILM TEARS) 1 4 % ophthalmic solution 1 drop, 1 drop, Both Eyes, Q3H PRN, Birtha Skiff, PA-C, 1 drop at 01/31/19 1649    predniSONE tablet 40 mg, 40 mg, Oral, Daily, [employer], PA-ANA, 40 mg at 02/06/19 6744    QUEtiapine (SEROquel) tablet 25 mg, 25 mg, Oral, HS, Birtha Skiff, PA-C    senna 8 8 mg/5 mL oral syrup 17 6 mg, 17 6 mg, Oral, BID, KATHY Barker, 17 6 mg at 02/05/19 2019    Labs & Results:    Lab Results   Component Value Date    CKTOTAL 463 (H) 02/01/2019    CKTOTAL 3,180 (H) 01/27/2019    CKTOTAL 10,421 (H) 01/26/2019    CKMB 3 9 02/01/2019    CKMB 14 9 (H) 01/27/2019    CKMB 38 7 (H) 01/26/2019    CKMBINDEX <1 0 02/01/2019    CKMBINDEX <1 0 01/27/2019    CKMBINDEX <1 0 01/26/2019    TROPONINI 36 20 (H) 01/24/2019    TROPONINI 36 30 (H) 01/24/2019    TROPONINI 34 90 (H) 01/24/2019       Lab Results   Component Value Date    GLUCOSE 92 04/09/2015    CALCIUM 8 0 (L) 02/06/2019     04/09/2015    K 4 8 02/06/2019    CO2 23 02/06/2019    CL 98 (L) 02/06/2019    BUN 79 (H) 02/06/2019    CREATININE 5 33 (H) 02/06/2019       Lab Results   Component Value Date    WBC 11 06 (H) 02/06/2019    HGB 7 8 (L) 02/06/2019    HCT 24 6 (L) 02/06/2019    MCV 86 02/06/2019     02/06/2019           Lab Results   Component Value Date    CHOL 146 07/18/2014    CHOL 145 02/21/2014     Lab Results   Component Value Date    HDL 41 06/02/2018    HDL 52 06/27/2017     Lab Results   Component Value Date    LDLCALC 57 06/02/2018    [facility] 129 (H) 06/27/2017     Lab Results   Component Value Date    TRIG 102 06/02/2018    TRIG 71 06/27/2017       Lab Results   Component Value Date    ALT 19 02/01/2019    AST 80 (H) 02/01/2019

## 2019-02-06 NOTE — RESPIRATORY THERAPY NOTE
RT Ventilator Management Note  Greta Nolan 60 y o  male MRN: [id number]  Unit/Bed#: [facility] 11 Encounter: [id number]      Daily Screen       2/5/2019 0740 2/6/2019 0734          Patient safety screen outcome[de-identified] Passed Passed      Spont breathing trial % for 30 min: Yes -      Spont breathing trial outcome[de-identified] Passed -      Spont breathing trial reason failed: RR > 35 bpm -      Previous settings resumed: Yes -      Name of Medical Team Notified[de-identified] RN -              Physical Exam:   Assessment Type: Assess only  General Appearance: Awake, Alert  Respiratory Pattern: Labored  Chest Assessment: Chest expansion symmetrical  Bilateral Breath Sounds: Diminished      Resp Comments: pt extubated without problem  No distress or stridor noted at this time  BIPAP applied Pt tolerating BIPAP   Will cont as tolerated

## 2019-02-06 NOTE — RESPIRATORY THERAPY NOTE
RT Ventilator Management Note  Yulissa Mayers 60 y o  male MRN: [id number]  Unit/Bed#: [facility] 11 Encounter: [id number]      Daily Screen       2/4/2019 0832 2/5/2019 0740          Patient safety screen outcome[de-identified] Passed Passed      Not Ready for Weaning due to[de-identified] Going on Transport intubated -      Spont breathing trial % for 30 min: - Yes      Spont breathing trial outcome[de-identified] - Passed      Spont breathing trial reason failed: - RR > 35 bpm      Previous settings resumed: - Yes      Name of Medical Team Notified[de-identified] - RN              Physical Exam:   Assessment Type: (P) Assess only  General Appearance: (P) Sedated  Respiratory Pattern: (P) Assisted  Chest Assessment: (P) Chest expansion symmetrical  Bilateral Breath Sounds: (P) Diminished      Resp Comments: (P) Pt remained on a/c settings throughout the night without incident

## 2019-02-06 NOTE — PROGRESS NOTES
Progress Note - General Surgery   Greta Nolan 60 y o  male MRN: [id number]  Unit/Bed#: [facility] 11 Encounter: [id number]    Assessment:  61 yo M with Acute Respiratory failure secondary to sepsis and cardiogenic shock    Plan:  Trach/PEG tomorrow 2/7 if unable to be extubated  Continue vent SBT trials per MICU with goal of extubation  TF at goal  If OR tomorrow, heparin gtt will need to be held 6 hrs preop  NPO @ Mn    Subjective/Objective   Chief Complaint:     Subjective: No issues  Stable on vent AC 16/450/40/5  Objective:     Blood pressure 110/66, pulse 74, temperature 97 9 °F (36 6 °C), temperature source Oral, resp  rate 22, height 5' 9" (1 753 m), weight (!) 167 kg (367 lb 8 1 oz), SpO2 98 %  ,Body mass index is 54 27 kg/m²        Intake/Output Summary (Last 24 hours) at 02/06/19 8909  Last data filed at 02/06/19 2721   Gross per 24 hour   Intake          2236 08 ml   Output                0 ml   Net          2236 08 ml       Invasive Devices     Central Venous Catheter Line            CVC Central Lines 02/04/19 Triple Left Internal jugular 1 day          Hemodialysis Catheter            Permanent HD Catheter  1 day          Drain            NG/OG/Enteral Tube Orogastric 4 days          Airway            ETT  Cuffed 7 5 mm 13 days                Physical Exam:   Gen: Intubated, sedated  Cardio: RRR  Lungs: CTAB  Abd: Obese, soft, non distended, non tender      Lab, Imaging and other studies:  CBC:   Lab Results   Component Value Date    WBC 11 06 (H) 02/06/2019    HGB 7 8 (L) 02/06/2019    HCT 24 6 (L) 02/06/2019    MCV 86 02/06/2019     02/06/2019    MCH 27 4 02/06/2019    MCHC 31 7 02/06/2019    RDW 18 0 (H) 02/06/2019    MPV 12 6 02/06/2019   , CMP:   Lab Results   Component Value Date    SODIUM 133 (L) 02/06/2019    K 4 8 02/06/2019    CL 98 (L) 02/06/2019    CO2 23 02/06/2019    BUN 79 (H) 02/06/2019    CREATININE 5 33 (H) 02/06/2019    CALCIUM 8 0 (L) 02/06/2019    EGFR 11 02/06/2019   , Coagulation: No results found for: PT, INR, APTT  VTE Pharmacologic Prophylaxis: Heparin  VTE Mechanical Prophylaxis: sequential compression device

## 2019-02-07 ENCOUNTER — APPOINTMENT (INPATIENT)
Dept: DIALYSIS | Facility: HOSPITAL | Age: 59
DRG: 871 | End: 2019-02-07
Attending: INTERNAL MEDICINE
Payer: COMMERCIAL

## 2019-02-07 LAB
ANION GAP SERPL CALCULATED.3IONS-SCNC: 10 MMOL/L (ref 4–13)
ANISOCYTOSIS BLD QL SMEAR: PRESENT
APTT PPP: 87 SECONDS (ref 26–38)
BASOPHILS # BLD MANUAL: 0 THOUSAND/UL (ref 0–0.1)
BASOPHILS NFR MAR MANUAL: 0 % (ref 0–1)
BUN SERPL-MCNC: 62 MG/DL (ref 5–25)
CA-I BLD-SCNC: 1.03 MMOL/L (ref 1.12–1.32)
CALCIUM SERPL-MCNC: 7.7 MG/DL (ref 8.3–10.1)
CHLORIDE SERPL-SCNC: 99 MMOL/L (ref 100–108)
CO2 SERPL-SCNC: 26 MMOL/L (ref 21–32)
CREAT SERPL-MCNC: 4.43 MG/DL (ref 0.6–1.3)
EOSINOPHIL # BLD MANUAL: 0 THOUSAND/UL (ref 0–0.4)
EOSINOPHIL NFR BLD MANUAL: 0 % (ref 0–6)
ERYTHROCYTE [DISTWIDTH] IN BLOOD BY AUTOMATED COUNT: 18 % (ref 11.6–15.1)
GFR SERPL CREATININE-BSD FRML MDRD: 14 ML/MIN/1.73SQ M
GLUCOSE SERPL-MCNC: 125 MG/DL (ref 65–140)
GLUCOSE SERPL-MCNC: 79 MG/DL (ref 65–140)
GLUCOSE SERPL-MCNC: 97 MG/DL (ref 65–140)
GLUCOSE SERPL-MCNC: 98 MG/DL (ref 65–140)
HCT VFR BLD AUTO: 24.8 % (ref 36.5–49.3)
HGB BLD-MCNC: 7.6 G/DL (ref 12–17)
LYMPHOCYTES # BLD AUTO: 0.19 THOUSAND/UL (ref 0.6–4.47)
LYMPHOCYTES # BLD AUTO: 2 % (ref 14–44)
MAGNESIUM SERPL-MCNC: 2.5 MG/DL (ref 1.6–2.6)
MCH RBC QN AUTO: 26.7 PG (ref 26.8–34.3)
MCHC RBC AUTO-ENTMCNC: 30.6 G/DL (ref 31.4–37.4)
MCV RBC AUTO: 87 FL (ref 82–98)
MONOCYTES # BLD AUTO: 0.94 THOUSAND/UL (ref 0–1.22)
MONOCYTES NFR BLD: 10 % (ref 4–12)
MYELOCYTES NFR BLD MANUAL: 1 % (ref 0–1)
NEUTROPHILS # BLD MANUAL: 8.15 THOUSAND/UL (ref 1.85–7.62)
NEUTS SEG NFR BLD AUTO: 87 % (ref 43–75)
NRBC BLD AUTO-RTO: 0 /100 WBCS
PHOSPHATE SERPL-MCNC: 7.3 MG/DL (ref 2.7–4.5)
PLATELET # BLD AUTO: 191 THOUSANDS/UL (ref 149–390)
PLATELET BLD QL SMEAR: ADEQUATE
PMV BLD AUTO: 12.8 FL (ref 8.9–12.7)
POIKILOCYTOSIS BLD QL SMEAR: PRESENT
POLYCHROMASIA BLD QL SMEAR: PRESENT
POTASSIUM SERPL-SCNC: 4.4 MMOL/L (ref 3.5–5.3)
RBC # BLD AUTO: 2.85 MILLION/UL (ref 3.88–5.62)
RBC MORPH BLD: PRESENT
SODIUM SERPL-SCNC: 135 MMOL/L (ref 136–145)
WBC # BLD AUTO: 9.37 THOUSAND/UL (ref 4.31–10.16)

## 2019-02-07 PROCEDURE — 99232 SBSQ HOSP IP/OBS MODERATE 35: CPT | Performed by: INTERNAL MEDICINE

## 2019-02-07 PROCEDURE — 97167 OT EVAL HIGH COMPLEX 60 MIN: CPT

## 2019-02-07 PROCEDURE — 99233 SBSQ HOSP IP/OBS HIGH 50: CPT | Performed by: INTERNAL MEDICINE

## 2019-02-07 PROCEDURE — 82948 REAGENT STRIP/BLOOD GLUCOSE: CPT

## 2019-02-07 PROCEDURE — 82330 ASSAY OF CALCIUM: CPT | Performed by: PHYSICIAN ASSISTANT

## 2019-02-07 PROCEDURE — 80048 BASIC METABOLIC PNL TOTAL CA: CPT | Performed by: PHYSICIAN ASSISTANT

## 2019-02-07 PROCEDURE — 85007 BL SMEAR W/DIFF WBC COUNT: CPT | Performed by: PHYSICIAN ASSISTANT

## 2019-02-07 PROCEDURE — 84100 ASSAY OF PHOSPHORUS: CPT | Performed by: PHYSICIAN ASSISTANT

## 2019-02-07 PROCEDURE — 94640 AIRWAY INHALATION TREATMENT: CPT

## 2019-02-07 PROCEDURE — 97110 THERAPEUTIC EXERCISES: CPT | Performed by: PHYSICAL THERAPIST

## 2019-02-07 PROCEDURE — 94760 N-INVAS EAR/PLS OXIMETRY 1: CPT

## 2019-02-07 PROCEDURE — 83735 ASSAY OF MAGNESIUM: CPT | Performed by: PHYSICIAN ASSISTANT

## 2019-02-07 PROCEDURE — 90935 HEMODIALYSIS ONE EVALUATION: CPT | Performed by: INTERNAL MEDICINE

## 2019-02-07 PROCEDURE — 85730 THROMBOPLASTIN TIME PARTIAL: CPT | Performed by: INTERNAL MEDICINE

## 2019-02-07 PROCEDURE — 85027 COMPLETE CBC AUTOMATED: CPT | Performed by: PHYSICIAN ASSISTANT

## 2019-02-07 PROCEDURE — 94660 CPAP INITIATION&MGMT: CPT

## 2019-02-07 PROCEDURE — G8988 SELF CARE GOAL STATUS: HCPCS

## 2019-02-07 PROCEDURE — G8987 SELF CARE CURRENT STATUS: HCPCS

## 2019-02-07 RX ORDER — DIGOXIN 0.25 MG/ML
250 INJECTION INTRAMUSCULAR; INTRAVENOUS ONCE
Status: COMPLETED | OUTPATIENT
Start: 2019-02-07 | End: 2019-02-07

## 2019-02-07 RX ORDER — ACETAMINOPHEN 325 MG/1
650 TABLET ORAL EVERY 6 HOURS PRN
Status: DISCONTINUED | OUTPATIENT
Start: 2019-02-07 | End: 2019-03-16 | Stop reason: HOSPADM

## 2019-02-07 RX ORDER — METOPROLOL TARTRATE 5 MG/5ML
5 INJECTION INTRAVENOUS EVERY 6 HOURS PRN
Status: DISCONTINUED | OUTPATIENT
Start: 2019-02-07 | End: 2019-02-21

## 2019-02-07 RX ORDER — METOPROLOL TARTRATE 5 MG/5ML
5 INJECTION INTRAVENOUS ONCE
Status: COMPLETED | OUTPATIENT
Start: 2019-02-07 | End: 2019-02-07

## 2019-02-07 RX ORDER — METOPROLOL TARTRATE 5 MG/5ML
2.5 INJECTION INTRAVENOUS ONCE
Status: COMPLETED | OUTPATIENT
Start: 2019-02-07 | End: 2019-02-07

## 2019-02-07 RX ORDER — OXYCODONE HYDROCHLORIDE 10 MG/1
10 TABLET ORAL EVERY 4 HOURS PRN
Status: DISCONTINUED | OUTPATIENT
Start: 2019-02-07 | End: 2019-02-21

## 2019-02-07 RX ORDER — METOPROLOL TARTRATE 5 MG/5ML
2.5 INJECTION INTRAVENOUS EVERY 6 HOURS PRN
Status: DISCONTINUED | OUTPATIENT
Start: 2019-02-07 | End: 2019-02-07

## 2019-02-07 RX ORDER — GUAIFENESIN 600 MG
600 TABLET, EXTENDED RELEASE 12 HR ORAL EVERY 12 HOURS SCHEDULED
Status: DISCONTINUED | OUTPATIENT
Start: 2019-02-07 | End: 2019-02-27

## 2019-02-07 RX ORDER — LORAZEPAM 2 MG/ML
0.5 INJECTION INTRAMUSCULAR ONCE
Status: COMPLETED | OUTPATIENT
Start: 2019-02-07 | End: 2019-02-07

## 2019-02-07 RX ORDER — METOPROLOL TARTRATE 5 MG/5ML
INJECTION INTRAVENOUS
Status: COMPLETED
Start: 2019-02-07 | End: 2019-02-07

## 2019-02-07 RX ORDER — SENNOSIDES 8.6 MG
1 TABLET ORAL
Status: DISCONTINUED | OUTPATIENT
Start: 2019-02-07 | End: 2019-02-12

## 2019-02-07 RX ORDER — ACETAMINOPHEN 325 MG/1
TABLET ORAL
Status: COMPLETED
Start: 2019-02-07 | End: 2019-02-07

## 2019-02-07 RX ADMIN — QUETIAPINE FUMARATE 25 MG: 25 TABLET ORAL at 22:03

## 2019-02-07 RX ADMIN — IPRATROPIUM BROMIDE 0.5 MG: 0.5 SOLUTION RESPIRATORY (INHALATION) at 19:18

## 2019-02-07 RX ADMIN — HEPARIN SODIUM AND DEXTROSE 27.1 UNITS/KG/HR: 10000; 5 INJECTION INTRAVENOUS at 19:42

## 2019-02-07 RX ADMIN — LEVALBUTEROL 1.25 MG: 1.25 SOLUTION, CONCENTRATE RESPIRATORY (INHALATION) at 00:36

## 2019-02-07 RX ADMIN — MIDODRINE HYDROCHLORIDE 5 MG: 5 TABLET ORAL at 15:21

## 2019-02-07 RX ADMIN — NYSTATIN 1 APPLICATION: 100000 POWDER TOPICAL at 08:10

## 2019-02-07 RX ADMIN — METOPROLOL TARTRATE 5 MG: 5 INJECTION, SOLUTION INTRAVENOUS at 22:04

## 2019-02-07 RX ADMIN — OXYCODONE HYDROCHLORIDE 10 MG: 10 TABLET ORAL at 14:31

## 2019-02-07 RX ADMIN — IPRATROPIUM BROMIDE 0.5 MG: 0.5 SOLUTION RESPIRATORY (INHALATION) at 13:46

## 2019-02-07 RX ADMIN — METOPROLOL TARTRATE 12.5 MG: 25 TABLET ORAL at 08:09

## 2019-02-07 RX ADMIN — METOPROLOL TARTRATE 5 MG: 5 INJECTION, SOLUTION INTRAVENOUS at 15:21

## 2019-02-07 RX ADMIN — MIDODRINE HYDROCHLORIDE 5 MG: 5 TABLET ORAL at 08:02

## 2019-02-07 RX ADMIN — DEXMEDETOMIDINE 0.4 MCG/KG/HR: 100 INJECTION, SOLUTION, CONCENTRATE INTRAVENOUS at 02:08

## 2019-02-07 RX ADMIN — METOPROLOL TARTRATE 2.5 MG: 5 INJECTION, SOLUTION INTRAVENOUS at 08:30

## 2019-02-07 RX ADMIN — METOPROLOL TARTRATE 12.5 MG: 25 TABLET ORAL at 08:33

## 2019-02-07 RX ADMIN — ACETAMINOPHEN 650 MG: 325 TABLET, FILM COATED ORAL at 11:49

## 2019-02-07 RX ADMIN — CEFAZOLIN SODIUM 1000 MG: 10 INJECTION, POWDER, FOR SOLUTION INTRAVENOUS at 17:51

## 2019-02-07 RX ADMIN — METOPROLOL TARTRATE 25 MG: 25 TABLET, FILM COATED ORAL at 20:43

## 2019-02-07 RX ADMIN — HEPARIN SODIUM AND DEXTROSE 27.1 UNITS/KG/HR: 10000; 5 INJECTION INTRAVENOUS at 09:57

## 2019-02-07 RX ADMIN — LEVALBUTEROL 1.25 MG: 1.25 SOLUTION, CONCENTRATE RESPIRATORY (INHALATION) at 07:43

## 2019-02-07 RX ADMIN — IPRATROPIUM BROMIDE 0.5 MG: 0.5 SOLUTION RESPIRATORY (INHALATION) at 00:36

## 2019-02-07 RX ADMIN — GUAIFENESIN 600 MG: 600 TABLET, EXTENDED RELEASE ORAL at 20:42

## 2019-02-07 RX ADMIN — NYSTATIN: 100000 POWDER TOPICAL at 17:49

## 2019-02-07 RX ADMIN — LORAZEPAM 0.5 MG: 2 INJECTION INTRAMUSCULAR; INTRAVENOUS at 10:30

## 2019-02-07 RX ADMIN — LEVALBUTEROL 1.25 MG: 1.25 SOLUTION, CONCENTRATE RESPIRATORY (INHALATION) at 13:46

## 2019-02-07 RX ADMIN — METOPROLOL TARTRATE 2.5 MG: 1 INJECTION, SOLUTION INTRAVENOUS at 08:06

## 2019-02-07 RX ADMIN — CALCIUM GLUCONATE 1 G: 98 INJECTION, SOLUTION INTRAVENOUS at 08:12

## 2019-02-07 RX ADMIN — DIGOXIN 250 MCG: 0.25 INJECTION INTRAMUSCULAR; INTRAVENOUS at 15:21

## 2019-02-07 RX ADMIN — LEVALBUTEROL 1.25 MG: 1.25 SOLUTION, CONCENTRATE RESPIRATORY (INHALATION) at 19:18

## 2019-02-07 RX ADMIN — CALCIUM ACETATE 1334 MG: 667 CAPSULE ORAL at 17:49

## 2019-02-07 RX ADMIN — METOPROLOL TARTRATE 2.5 MG: 5 INJECTION, SOLUTION INTRAVENOUS at 09:39

## 2019-02-07 RX ADMIN — IPRATROPIUM BROMIDE 0.5 MG: 0.5 SOLUTION RESPIRATORY (INHALATION) at 07:43

## 2019-02-07 RX ADMIN — POLYVINYL ALCOHOL 1 DROP: 14 SOLUTION/ DROPS OPHTHALMIC at 08:13

## 2019-02-07 RX ADMIN — AMIODARONE HYDROCHLORIDE 1 MG/MIN: 50 INJECTION, SOLUTION INTRAVENOUS at 11:36

## 2019-02-07 RX ADMIN — PREDNISONE 40 MG: 20 TABLET ORAL at 08:09

## 2019-02-07 RX ADMIN — CALCIUM ACETATE 1334 MG: 667 CAPSULE ORAL at 08:04

## 2019-02-07 RX ADMIN — AMIODARONE HYDROCHLORIDE 200 MG: 200 TABLET ORAL at 08:02

## 2019-02-07 NOTE — PROGRESS NOTES
Progress Note - Critical Care   Rell Moran 60 y o  male MRN: [id number]  Unit/Bed#: MICU 11 Encounter: [id number]    Attending Physician: Deep Dey DO      ______________________________________________________________________  Assessment and Plan:   Principal Problem:    Cardiogenic shock ([facility] )  Active Problems:    Increased BMI    NSTEMI (non-ST elevated myocardial infarction) ([facility] )    PATRICK (acute kidney injury) ([facility] )    Stress-induced cardiomyopathy    Acute respiratory failure with hypoxia ([facility] )    History of aortic valve replacement with bioprosthetic valve    Atrial fibrillation ([facility] )    Septic shock (HCC)    Staphylococcus aureus bacteremia    Transaminitis    Thrombocytopenia (HCC)    Anemia    Leukocytosis  Resolved Problems:    Acute encephalopathy    Rhabdomyolysis    60year-old, PMHx of  HTN, bioprosthetic valve admitted with shock of multifactorial etiology with PATRICK and newly diagnosed AFib with RVR     Neuro:   · Patient is alert and interractive   · Sedation & Analgesia: Precedex, consider gradually weaning off  · Seroquel 25 mg PO QHS  · Neuro checks as per unit protocol  · Take precautions to prevent ICU delirium  Monitor GCS     Peripheral Neuropathy  · Gabapentin 100 daily dc'd 02/06, consider restarting if symtomatic     Conjunctivitis  · Artificial tears prn     CV:   · Shock, likely of multifactorial etiology,off pressors  · BNP elevated  · Midodrine 5 mg 3x daily, consider discontinuing it    · Continue to monitor with MAP goal >65      New onset A fib with RVR   - Amiodarone oral   - A fib rate controlled with HR between 70 to 86, has persistently stayed at 144 this morning, per cardiology start amio drip s/p 7 5 mg total pushes of Lopressor and 25 mg PO    - Metoprolol increased to 25 mg bid , per HF team  - On Heparin drip for [facility]  - per Cardiology, consider a repeat TTE when clinically improved  - May transition to coumadin per Cards, if stable  - continue tele monitoring  - Echo 1/25: EF 30% with moderate diffuse hypokinesis, no evidence of vegetations  - History of Bioprosthetic AoVR 2014     NSTEMI type 2 2/2 shock   Continuous telemetry      Pulm:   Acute hypoxic respiratory failure  - Extubated 02/06  - Did well on Bipap and an hour on HF, will monitor on HF today and consider Bipap at night   - SPO2 goal > 92%  - Prednisone 40 mg daily  - Albuterol nebs Q 6  -monitor secretions        GI:   Shock liver vs hepatic congestion  Extubated yesterday, trial on HF with bedside swallow eval today  Nutrition consult    Bowel regimen: Miralax, senna, Dulcolax         :   PATRICK likely 2/2 ischemia vs Septic ATN vs Rhabdo  · CRRT dc'd transitioned to IHD 02/01  · HD M/W/F  · HD 02/06 with 2 L removed  · Pt is fluid over loaded, to have 2 5L removed through UF, per Nephro  · UOP -anuric   · Monitor I&Os  · Cr 4 43 from 5 3 (BL 0 7-0 8),  · CK improved markedly, will stop trending  · PermaCath placed by IR R subclavian and LIJ exchange (02/04)  · Nephrology following and recommend HD M/W/F        F/E/N:  1  Fluids/Electrolytes  · Will monitor  Electrolytes and Replete as needed  · Persistently high Phos, started PhosLo by Nephro     2   Nutrition: consult         ID:   MSSA bacteremia , likely 2/2 MSSA PNA in the setting of positive Sputum Culture  WBC stable gradually improving  Bcx 1/27 negative so far  CHON negative for vegetation, EF  30%  Day # 13 of Ancef, renally dosed ,will treat for at least 6 weeks, per ID  Trend fever curve and WBC   Per ID, may consider spine imaging         Heme:   Thrombocytopenia likely consumptive, improved  Hbg- stable at 8 2, likely dilutional, pt has no acute bleeding, will continue to trend CBC  tranfuse as needed with goal >7  DVT ppx:  On heparin drip for stroke ppx, PTT- 87,  Continue SCDs     Endo:   Blood sugars well controlled,  over the last 24 hrs  Goal of 140-180  Will continue to monitor  SSI     · Msk/Skin:  · Painful left great toe with DTI, will re consult podiatry  · Turn/position 2 hourly  · Wound care   · PT/OT following, recommends Post acute IP rehab         Disposition: Continued ICU stay    Code Status: Level 1 - Full Code    Counseling / Coordination of Care  Total Critical Care time spent 30 minutes excluding procedures, teaching and family updates  ______________________________________________________________________    Chief Complaint: On bipap, follows commands  24 Hour Events:   No overnight events    ______________________________________________________________________    Physical Exam:     Physical Exam   Constitutional:    obese   HENT:   Head: Normocephalic and atraumatic  Neck: Normal range of motion  Cardiovascular:   irregular   Pulmonary/Chest:   Diminished BS b/l lower L bases   Abdominal: Bowel sounds are normal    [place] abdomen    Musculoskeletal: He exhibits edema  Neurological: He is alert  On bipap   Skin: Skin is warm and dry  Deep tissue injury left great toe   Psychiatric:   Sad affect       ______________________________________________________________________  Vitals:    19 0541 19 0600 19 0705 19 0743   BP: 113/72 116/75 152/67    BP Location:       Pulse: 76 82 (!) 112    Resp: (!) 24 22 (!) 27    Temp:       TempSrc:       SpO2: 100% 100% 100% 100%   Weight: (!) 172 kg (379 lb 3 1 oz)      Height:           Temperature:   Temp (24hrs), Av 1 °F (36 7 °C), Min:97 5 °F (36 4 °C), Max:98 5 °F (36 9 °C)    Current Temperature: 97 5 °F (36 4 °C)  Weights:   IBW: 70 7 kg    Body mass index is 56 kg/m²    Weight (last 2 days)     Date/Time   Weight    19 0541  (!)  172 (379 19)    19 0444  (!)  167 (367 51)    19 0415  (!)  165 (362 88)            Hemodynamic Monitoring:  N/A     Non-Invasive/Invasive Ventilation Settings:  Respiratory    Lab Data (Last 4 hours)    None         O2/Vent Data (Last 4 hours)    None              No results found for: PHART, GDW9FZA, Jose Rafal, X8CGTOBC, BEART, SOURCE    Intake and Outputs:  I/O       02/05 0701 - 02/06 0700 02/06 0701 - 02/07 0700    I V  (mL/kg) 991 1 (5 9) 1410 7 (8 2)    IV Piggyback 50     Feedings 1195 57    Total Intake(mL/kg) 2236 1 (13 4) 1467 7 (8 5)    Other  2000    Total Output   2000    Net +2236 1 -532 3          Unmeasured Stool Occurrence 1 x 1 x           Nutrition:        Diet Orders            Start     Ordered    01/26/19 0848  Diet Enteral/Parenteral; Tube Feeding No Oral Diet; Nepro; Continuous; 52  Diet effective now     Question Answer Comment   Diet Type Enteral/Parenteral    Enteral/Parenteral Tube Feeding No Oral Diet    Tube Feeding Formula: Nepro    Bolus/Cyclic/Continuous Continuous    Tube Feeding Goal Rate (mL/hr): 52    RD to adjust diet per protocol? Yes        01/26/19 0848          Labs:     Results from last 7 days  Lab Units 02/07/19 0455 02/06/19 0458 02/05/19  0541 02/04/19  0514   WBC Thousand/uL 9 37 11 06* 10 20* 15 64*   HEMOGLOBIN g/dL 7 6* 7 8* 7 7* 8 2*   HEMATOCRIT % 24 8* 24 6* 25 0* 26 0*   PLATELETS Thousands/uL 191 172 145* 140*   MONO PCT %  --  8 3* 5       Results from last 7 days  Lab Units 02/07/19 0455 02/06/19 0458 02/05/19  0541  02/01/19  0605   POTASSIUM mmol/L 4 4 4 8 4 6  < > 4 5   CHLORIDE mmol/L 99* 98* 100  < > 104   CO2 mmol/L 26 23 25  < > 23   BUN mg/dL 62* 79* 58*  < > 35*   CREATININE mg/dL 4 43* 5 33* 4 06*  < > 2 13*   CALCIUM mg/dL 7 7* 8 0* 7 8*  < > 8 4   ALK PHOS U/L  --   --   --   --  108   ALT U/L  --   --   --   --  19   AST U/L  --   --   --   --  80*   < > = values in this interval not displayed      Results from last 7 days  Lab Units 02/07/19 0455 02/06/19 0458 02/05/19  0541   MAGNESIUM mg/dL 2 5 2 5 2 4     Lab Results   Component Value Date    PHOS 7 3 (H) 02/07/2019    PHOS 8 1 (H) 02/06/2019    PHOS 6 9 (H) 02/05/2019        Results from last 7 days  Lab Units 02/07/19  0455 02/06/19  0458 02/05/19  1128   PTT seconds 87* 78* 77*       0  Lab Value Date/Time   TROPONINI 36 20 (H) 01/24/2019 2137   TROPONINI 36 30 (H) 01/24/2019 1831   TROPONINI 34 90 (H) 01/24/2019 1613   TROPONINI >40 00 (HH) 01/24/2019 0502   TROPONINI >40 00 (HH) 01/24/2019 0207   TROPONINI 36 61 (HH) 01/23/2019 2251   TROPONINI 24 29 (HH) 01/23/2019 1850   TROPONINI 14 20 (HH) 01/23/2019 1600         ABG:  Lab Results   Component Value Date    PHART 7 477 (H) 01/26/2019    HQE6PPZ 29 6 (LL) 01/26/2019    PO2ART 71 7 (L) 01/26/2019    XGD4EID 21 4 (L) 01/26/2019    BEART -1 1 01/26/2019    SOURCE Line, Arterial 01/26/2019     Imaging: No new imaging      Micro:  Lab Results   Component Value Date    BLOODCX No Growth After 5 Days  01/27/2019    BLOODCX No Growth After 5 Days  01/27/2019    BLOODCX Staphylococcus aureus (A) 01/26/2019    BLOODCX Staphylococcus aureus (A) 01/26/2019    URINECX 9364-2785 cfu/ml Gram Positive Cocci (A) 01/23/2019    SPUTUMCULTUR 1+ Growth of Staphylococcus aureus (A) 01/24/2019     Allergies:    Allergies   Allergen Reactions    Penicillins Rash     However, has subsequently tolerated Cefazolin and Cefepime     Medications:   Scheduled Meds:    Current Facility-Administered Medications:  acetaminophen 650 mg Oral Q6H PRN Temo Uribe MD    albuterol 2 5 mg Nebulization Q4H PRN HERBERT Estes    amiodarone 200 mg Oral Daily With Breakfast HERBERT Estes    bisacodyl 10 mg Rectal Daily PRN HERBERT Estes    calcium acetate 1,334 mg Oral BID Lmua Alvarado MD    calcium gluconate 1 g Intravenous Once Lilia Masters MD    cefazolin 1,000 mg Intravenous Q24H Lilia Masters MD Last Rate: 1,000 mg (02/06/19 1724)   chlorhexidine 15 mL Swish & Spit Q12H [address] HERBERT Estes    dexmedetomidine 0 1-0 7 mcg/kg/hr Intravenous Titrated Temo Uribe MD Last Rate: Stopped (02/07/19 0606)   heparin (porcine) 3-30 Units/kg/hr (Order-Specific) Intravenous Titrated Lavinia Rosa PA-C Last Rate: 27 1 Units/kg/hr (02/06/19 2315)   HYDROmorphone 1 mg Intravenous Q3H PRN Lidya Martinez    insulin lispro 5-25 Units Subcutaneous Q6H [facility] HERBERT Estes    ipratropium 0 5 mg Nebulization Q6H Charmainerey Santana PA-C    levalbuterol 1 25 mg Nebulization Q6H Charmainerey Santana PA-C    metoprolol 2 5 mg Intravenous Q6H PRN Ilene Llamas MD    metoprolol tartrate 12 5 mg Oral Q12H [facility] Earnesteen Peace, DO    midodrine 5 mg Oral TID AC KATHY Javier    nystatin  Topical BID HERBERT Estes    oxyCODONE 10 mg Oral Q4H PRN Lidya Martinez    polyethylene glycol 17 g Oral Daily KATHY Mathias    polyvinyl alcohol 1 drop Both Eyes Q3H PRN Charmaine Santana PA-C    [facility]SONE 40 [facility] Daily [address] [facility]    QUEtiapine 25 mg Oral HS Charmainerey Santana PA-C    senna 17 6 mg Oral BID KATHY Javier      Continuous Infusions:    dexmedetomidine 0 1-0 7 mcg/kg/hr Last Rate: Stopped (02/07/19 0606)   heparin (porcine) 3-30 Units/kg/hr (Order-Specific) Last Rate: 27 1 Units/kg/hr (02/06/19 2315)     PRN Meds:    acetaminophen 650 mg Q6H PRN   albuterol 2 5 mg Q4H PRN   bisacodyl 10 mg Daily PRN   HYDROmorphone 1 mg Q3H PRN   metoprolol 2 5 mg Q6H PRN   oxyCODONE 10 mg Q4H PRN   polyvinyl alcohol 1 drop Q3H PRN     VTE Pharmacologic Prophylaxis: Heparin Drip  VTE Mechanical Prophylaxis: sequential compression device  Invasive lines and devices: Invasive Devices     Central Venous Catheter Line            CVC Central Lines 02/04/19 Triple Left Internal jugular 2 days          Hemodialysis Catheter            Permanent HD Catheter  2 days                     Portions of the record may have been created with voice recognition software  Occasional wrong word or "sound a like" substitutions may have occurred due to the inherent limitations of voice recognition software  Read the chart carefully and recognize, using context, where substitutions have occurred      Ilene Llamas MD

## 2019-02-07 NOTE — PROGRESS NOTES
Heart Failure Service Progress Note - Yue Martínez 60 y o  male MRN: [id number]    [facility] En[facility]: [id number]      Assessment:    Principal Problem:    Cardiogenic shock ([facility] )  Active Problems:    Increased BMI    NSTEMI (non-ST elevated myocardial infarction) ([facility] )    PATRICK (acute kidney injury) ([facility] )    Stress-induced cardiomyopathy    Acute respiratory failure with hypoxia ([place] [address] )    History of aortic valve replacement with bioprosthetic valve    Atrial fibrillation (HCC)    Septic shock (HCC)    Staphylococcus aureus bacteremia    Transaminitis    Thrombocytopenia (HCC)    Anemia    Leukocytosis      Subjective:   Patient seen and examined  No significant events overnight  Objective: Intake/ Output: 1467/2000  Weight:   Tele: heart rates     # Mixed shock: Primarily driven by sepsis w/ cardiogenic component   MSSA bacteremia  Off vasopressors  # Hypoxic respiratory failure, extubated 2/6     # BiV Systolic HF     Echocardiogram 1/24/19  LVEF: 30%  LVIDd: 6 6 cm  RV: moderately reduced  MR:  PASP:  RVOT:   Other: bioprosthetic AV, mean gradient of 13 mmHg     Neurohormonal Blockade: BP precludes  --Beta-Blocker: metoprolol tartrate 25 mg BID  --ACEi, ARB or ARNi:    (or SVR reduction)  --Aldosterone Receptor Blocker:  --Diuretic:     Sudden Cardiac Death Risk Reduction:  --ICD:   Interrogation:      # Bio AVR: CHON without vegetation  # NSTEMI type II  # hypotension, on midodrine  # New onset AFib on long term AC for CVA ppx: amiodarone 200 mg daily, metoprolol 25 mg Q12  # ESRD- on HD  # Shock liver  # anemia of chronic disease    Plan:  Amio  Increase metoprolol (as you are) to 25 mg Q12 to help HR control    [employer] (day, reason): Turner catheter (day, reason):    Vitals: Blood pressure 152/67, pulse (!) 112, temperature 97 5 °F (36 4 °C), temperature source Oral, resp  rate (!) 27, height 5' 9" (1 753 m), weight (!) 172 kg (379 lb 3 1 oz), SpO2 100 %  , Body mass index is 56 kg/m²  , I/O last 3 completed shifts: In: 2926 9 [I V :1993 9; IV Piggyback:50; Feedings:883]  Out: 2000 [Other:2000]  No intake/output data recorded  Wt Readings from Last 3 Encounters:   02/07/19 (!) 172 kg (379 lb 3 1 oz)   01/24/19 (!) 154 kg (339 lb 8 1 oz)   06/11/18 (!) 155 kg (341 lb)       Intake/Output Summary (Last 24 hours) at 02/07/19 0840  Last data filed at 02/07/19 0606   Gross per 24 hour   Intake           1430 7 ml   Output             2000 ml   Net           -569 3 ml     I/O last 3 completed shifts: In: 2926 9 [I V :1993 9; IV Piggyback:50; Feedings:883]  Out: 2000 [Other:2000]    No significant arrhythmias seen on telemetry review         Physical Exam:  Vitals:    02/07/19 0541 02/07/19 0600 02/07/19 0705 02/07/19 0743   BP: 113/72 116/75 152/67    BP Location:       Pulse: 76 82 (!) 112    Resp: (!) 24 22 (!) 27    Temp:       TempSrc:       SpO2: 100% 100% 100% 100%   Weight: (!) 172 kg (379 lb 3 1 oz)      Height:           GEN: Lilia Pace appears well, alert and oriented x 3, pleasant and cooperative   HEENT: pupils equal, round, and reactive to light; extraocular muscles intact  NECK: supple, no carotid bruits   HEART: regular rhythm, normal S1 and S2, no murmurs, clicks, gallops or rubs, JVP is    LUNGS: clear to auscultation bilaterally; no wheezes, rales, or rhonchi   ABDOMEN: normal bowel sounds, soft, no tenderness, no distention  EXTREMITIES: peripheral pulses normal; no clubbing, cyanosis, or edema  NEURO: no focal findings   SKIN: normal without suspicious lesions on exposed skin      Current Facility-Administered Medications:     acetaminophen (TYLENOL) oral suspension 650 mg, 650 mg, Oral, Q6H PRN, Noe Salgado MD, 650 mg at 01/24/19 1946    albuterol inhalation solution 2 5 mg, 2 5 mg, Nebulization, Q4H PRN, Alexandra Kenney PA-C    amiodarone tablet 200 mg, 200 mg, Oral, Daily With Breakfast, Alexandra Kenney PA-C, 200 mg at 02/07/19 0802    bisacodyl (DULCOLAX) rectal suppository 10 mg, 10 mg, Rectal, Daily PRN, Prashanth Kiser PA-C    calcium acetate (PHOSLO) capsule 1,334 mg, 1,334 mg, Oral, BID, Mercedes Mcintosh MD, 1,334 mg at 02/07/19 0804    calcium gluconate 1 g in sodium chloride 0 9 % 100 mL IVPB, 1 g, Intravenous, Once, Clement Pedraza MD, Last Rate: 100 mL/hr at 02/07/19 0812, 1 g at 02/07/19 0812    ceFAZolin (ANCEF) 1 g in sodium chloride 0 9% 50 ml IVPB, 1,000 mg, Intravenous, Q24H, Clement Pedraza MD, Last Rate: 100 mL/hr at 02/06/19 1724, 1,000 mg at 02/06/19 1724    chlorhexidine (PERIDEX) 0 12 % oral rinse 15 mL, 15 mL, [place] [employer] [place], Q12H [address], Prashanth Kiser PA-C, 15 mL at 02/06/19 2109    dexmedetomidine (PRECEDEX) 200 mcg in sodium chloride 0 9 % 50 mL infusion, 0 1-0 7 mcg/kg/hr, Intravenous, Titrated, Alba Vogel MD, Stopped at 02/07/19 0606    heparin (porcine) 25,000 units in 250 mL infusion (premix), 3-30 Units/kg/hr (Order-Specific), Intravenous, Titrated, Niko Milner PA-C, Last Rate: 24 4 mL/hr at 02/06/19 2315, 27 1 Units/kg/hr at 02/06/19 2315    HYDROmorphone (DILAUDID) injection 1 mg, 1 mg, Intravenous, Q3H PRN, Lidya Martinez, 1 mg at 02/04/19 1819    insulin lispro (HumaLOG) 100 units/mL subcutaneous injection 5-25 Units, 5-25 Units, Subcutaneous, Q6H [address], 5 Units at 02/05/19 1744 **AND** Fingerstick Glucose (POCT), , , Q6H, Prashanth Kiser PA-C    ipratropium (ATROVENT) 0 02 % inhalation solution 0 5 mg, 0 5 mg, Nebulization, Q6H, Andrew Hamilton PA-C, 0 5 mg at 02/07/19 0743    levalbuterol (XOPENEX) inhalation solution 1 25 mg, 1 25 mg, Nebulization, Q6H, Andrew Hamilton PA-C, 1 25 mg at 02/07/19 0743    metoprolol (LOPRESSOR) injection 2 5 mg, 2 5 mg, Intravenous, Q6H PRN, Clement Pedraza MD, 2 5 mg at 02/07/19 0806    metoprolol tartrate (LOPRESSOR) tablet 25 mg, 25 mg, Oral, Q12H [address], Olive Umanzor PA-C    midodrine (PROAMATINE) tablet 5 mg, 5 mg, Oral, TID AC, KATHY South, 5 mg at 02/07/19 0802    nystatin (MYCOSTATIN) powder, , Topical, BID, Sara Blanton PA-C, 1 application at 50/60/08 0810    oxyCODONE (ROXICODONE) oral solution 10 mg, 10 mg, Oral, Q4H PRN, Lidya Martinez, 10 mg at 02/04/19 2627    polyethylene glycol (MIRALAX) packet 17 g, 17 g, Oral, Daily, KATHY Mathias, 17 g at 02/06/19 3566    polyvinyl alcohol (LIQUIFILM TEARS) 1 4 % ophthalmic solution 1 drop, 1 drop, Both Eyes, Q3H PRN, Fabiana Nguyen PA-C, 1 drop at 02/07/19 0813    predniSONE tablet 40 mg, 40 mg, Oral, Daily, Fluor CECILE Colon-C, 40 mg at 02/07/19 0809    QUEtiapine (SEROquel) tablet 25 mg, 25 mg, Oral, HS, JIM TerrellC, Stopped at 02/06/19 2106    senna 8 8 mg/5 mL oral syrup 17 6 mg, 17 6 mg, Oral, BID, JusKATHY Valdivia, Stopped at 02/06/19 2106      Labs & Results:      Results from last 7 days  Lab Units 02/01/19  0605   CK TOTAL U/L 463*   CK MB INDEX % <1 0     Results from last 7 days  Lab Units 02/07/19  0455 02/06/19  0458 02/05/19  0541   WBC Thousand/uL 9 37 11 06* 10 20*   HEMOGLOBIN g/dL 7 6* 7 8* 7 7*   HEMATOCRIT % 24 8* 24 6* 25 0*   PLATELETS Thousands/uL 191 172 145*           Results from last 7 days  Lab Units 02/07/19  0455 02/06/19  0458 02/05/19  0541  02/01/19  0605   POTASSIUM mmol/L 4 4 4 8 4 6  < > 4 5   CHLORIDE mmol/L 99* 98* 100  < > 104   CO2 mmol/L 26 23 25  < > 23   BUN mg/dL 62* 79* 58*  < > 35*   CREATININE mg/dL 4 43* 5 33* 4 06*  < > 2 13*   CALCIUM mg/dL 7 7* 8 0* 7 8*  < > 8 4   ALK PHOS U/L  --   --   --   --  108   ALT U/L  --   --   --   --  19   AST U/L  --   --   --   --  80*   < > = values in this interval not displayed  Counseling / Coordination of Care  Total floor / unit time spent today 25 minutes  Greater than 50% of total time was spent with the patient and / or family counseling and / or coordination of care  A description of the counseling / coordination of care: 15        Thank you for the opportunity to participate in the care of this patient  Cherylene Cleaver D O    Director Heart Failure/ Medical Director [address] [place] [address]

## 2019-02-07 NOTE — CONSULTS
Consult - [facility] 60 y o  male MRN: [id number]  [facility] En[facility]: [id number]    Assessment/Plan     Assessment:  1) Deep Tissue Injury of left great toe   - no open wounds, toe is warm to touch, ecchymotic in appearance, triphasic pedal pulses   - likely 2/2 cardiogenic shock due to lack of perfusion    Plan:  - continue to monitor areas of ecchymosis, there are no open wounds, no dressings are needed at this time, if toes become gangrenous or cool to touch, will require further workup  - discussed with primary team, podiatry will sign off for now, please do not hesitate to reconsult or call with questions/concerns    History of Present Illness     HPI:  Greta Nolan is a 60 y o  male is a patient currently admitted for cardiogeic shock  Podiatry is re-consulted to due concern of color changes to left toes  Patient was unable to speak during examination however able to nod during questioning  Patient is currently undergoing hemodialysis  with RNs at bedside  Patient reports pain to left toes upon palpation  He also nodded in response that he may have bumped his toes prior to admission  Consults  Review of Systems   Constitutional: Negative  HENT: Negative  Eyes: Negative  Respiratory: Negative  Cardiovascular: Negative  Gastrointestinal: Negative  Musculoskeletal: negative   Skin:ecchymotic left toes   Neurological: Negative  Psych: negative         Historical Information   Past Medical History:   Diagnosis Date    Allergic rhinitis     last assessed 9/12/12    Finger fracture, right     Closed fx of the middle phalanx of the right 5th finger  last assessed 1/30/14    Hemorrhoids     last assessed 2/10/14    Hyperlipidemia     Hypertension      Past Surgical History:   Procedure Laterality Date    AORTIC VALVE REPLACEMENT  07/30/2014    AVR with 25mm [facility] Ease bovine pericardial valve    CARDIAC CATHETERIZATION  07/18/2014    [facility] left main-normal, circumflex-normal, ramus intermedius-normal, RCA was large and dominant giving rise to the PDA & a large posterolateral branch  No disease    last assessed 8/19/14     LUPE MINA LAST HSPTL PLACEMENT  2/4/2019    KNEE CARTILAGE SURGERY Left     medial meniscus tear     TESTICLE SURGERY      TONSILLECTOMY AND ADENOIDECTOMY      TOOTH EXTRACTION       Social History   History   Alcohol Use No     Comment: ocassion /never drank alcohol per [employer]      History   Drug Use No     History   Smoking Status    Never Smoker   Smokeless Tobacco    Never Used     Family History:   Family History   Problem Relation Age of Onset    Lung cancer Mother     Heart disease Mother         pacemaker placement     Coronary artery disease Father     Hypertension Father     Heart attack Father     Cancer Family         bladder        Meds/Allergies   Prescriptions Prior to Admission   Medication    amLODIPine (NORVASC) 5 mg tablet    ascorbic acid (VITAMIN C) 500 MG tablet    aspirin (ECOTRIN) 325 mg EC tablet    fluticasone (FLONASE) 50 mcg/act nasal spray    loratadine (CLARITIN) 10 mg tablet    metoprolol tartrate (LOPRESSOR) 100 mg tablet    potassium chloride (K-DUR,KLOR-CON) 20 mEq tablet    pravastatin (PRAVACHOL) 40 mg tablet    torsemide (DEMADEX) 20 mg tablet     Allergies   Allergen Reactions    Penicillins Rash     However, has subsequently tolerated Cefazolin and Cefepime       Objective   First Vitals:   Blood Pressure: 124/72 (01/24/19 1530)  Pulse: 94 (01/24/19 1530)  Temperature: 99 °F (37 2 °C) (01/24/19 1530)  Temp Source: Rectal (01/24/19 1530)  Respirations: 20 (01/24/19 1530)  Height: 5' 9" (175 3 cm) (01/24/19 1530)  Weight - Scale: (!) 145 kg (319 lb 3 6 oz) (01/24/19 1530)  SpO2: 98 % (01/24/19 1500)    Current Vitals:   Blood Pressure: 139/87 (02/07/19 1200)  Pulse: (!) 144 (02/07/19 1200)  Temperature: 99 1 °F (37 3 °C) (02/07/19 1158)  Temp Source: Axillary (02/07/19 1158)  Respirations: (!) 32 (02/07/19 1200)  Height: 5' 9" (175 3 cm) (01/24/19 1530)  Weight - Scale: (!) 172 kg (379 lb 3 1 oz) (02/07/19 0541)  SpO2: 100 % (02/07/19 1200)        /87   Pulse (!) 144   Temp 99 1 °F (37 3 °C) (Axillary)   Resp (!) 32   Ht 5' 9" (1 753 m)   Wt (!) 172 kg (379 lb 3 1 oz)   SpO2 100%   BMI 56 00 kg/m²      General Appearance:    Alert, cooperative, no distress   Head:    Normocephalic, without obvious abnormality, atraumatic   Eyes:    PERRL, conjunctiva/corneas clear, EOM's intact        Nose:   Moist mucous membranes   Neck:   Supple, symmetrical, trachea midline   Back:     Symmetric   Lungs:     Respirations unlabored   Heart:    Regular rate and rhythm, S1 and S2 normal, no murmur, rub   or gallop   Abdomen:     Soft, non-tender   Extremities:   MSK function WNL   Vascular:   Pedal pulses triphasic upon doppler; pitting edema noted to bilateral lower extremities   Skin:   Ecchymotic to distal aspect of left hallux   Neurologic:   Gross sensation is intact  Protective sensation is intact  Lab Results:   Admission on 01/24/2019   No results displayed because visit has over 200 results  Invalid input(s): LABAEARO            Imaging: I have personally reviewed pertinent films in PACS  EKG, Pathology, and Other Studies: I have personally reviewed pertinent reports        Code Status: Level 1 - Full Code  Advance Directive and Living Will:      Power of :    POLST:

## 2019-02-07 NOTE — PLAN OF CARE
Problem: OCCUPATIONAL THERAPY ADULT  Goal: Performs self-care activities at highest level of function for planned discharge setting  See evaluation for individualized goals  Treatment Interventions: ADL retraining, Functional transfer training, UE strengthening/ROM, Endurance training, Cognitive reorientation, Patient/family training, Equipment evaluation/education, Fine motor coordination activities, Compensatory technique education, Continued evaluation, Energy conservation, Activityengagement          See flowsheet documentation for full assessment, interventions and recommendations  Limitation: Decreased ADL status, Decreased UE ROM, Decreased UE strength, Decreased Safe judgement during ADL, Decreased cognition, Decreased endurance, Decreased fine motor control, Decreased self-care trans, Decreased high-level ADLs  Prognosis: Fair  Assessment: Pt is a 59 y/o male seen for OT eval s/p adm to [facility] w/ worsening malaise and weakness  Pt developed severe respiratory distress and was intubated  Pt is dx'd w/ cardiogenic shock, NSTEMI, PATRICK, cardiomyopathy, acute respiratory failure w/ hypoxia, afib, sepsis, thrombocytopenia, anemia and leukocytosis  Comorbidities include a h/o allergic rhinitis, finger fracture, hemorrhoids, HLD, HTN  Pt with active OT orders  Pt lives with spouse and mother in law in 1 SH, 5 BRENT, main bath 1st floor, bed 2nd floor w/ 1/2 bath  Pt was I w/ ADLS (except for donning shoes) and IADLS, drove, & required no use of DME PTA but has a RW if needed  Pt is currently demonstrating the following occupational deficits: Max A UB ADLS, Total A LB ADLS, Total A bed mobility  Not appropriate for transfers/functional mobility at this time   These deficits that are impacting pt's baseline areas of occupation are a result of the following impairments: pain, endurance, activity tolerance, functional mobility, forward functional reach, balance, trunk control, functional standing tolerance, unsupportive home environment, decreased I w/ ADLS/IADLS, strength, ROM, cognitive impairments, decreased safety awareness, decreased insight into deficits and impaired fine motor skills  The following Occupational Performance Areas to address include: eating, grooming, bathing/shower, toilet hygiene, dressing, medication management, socialization, health maintenance, functional mobility, community mobility, clothing management, cleaning, household maintenance and job performance/volunteering  Pt scored overall 5/100 on the Barthel Index  Based on the aforementioned OT evaluation, functional performance deficits, and assessments, pt has been identified as a high complexity evaluation  Recommend STR upon D/C, when medically stable   Pt to continue to benefit from acute immediate OT services to address the following goals 3-5x/week to  w/in 10-14 days:      OT Discharge Recommendation: Short Term Rehab  OT - OK to Discharge: Yes (when medically stable)      Comments: Radha Gomez MS, OTR/L

## 2019-02-07 NOTE — PLAN OF CARE
SAFETY ADULT     Maintain or return to baseline ADL function Not Progressing     Maintain or return mobility status to optimal level Not Progressing          CARDIOVASCULAR - ADULT     Maintains optimal cardiac output and hemodynamic stability Progressing     Absence of cardiac dysrhythmias or at baseline rhythm Progressing        DISCHARGE PLANNING     Discharge to home or other facility with appropriate resources Progressing        DISCHARGE PLANNING - CARE MANAGEMENT     Discharge to post-acute care or home with appropriate resources Progressing        GENITOURINARY - ADULT     Maintains or returns to baseline urinary function Progressing     Absence of urinary retention Progressing        INFECTION - ADULT     Absence or prevention of progression during hospitalization Progressing     Absence of fever/infection during neutropenic period Progressing        Knowledge Deficit     Patient/family/caregiver demonstrates understanding of disease process, treatment plan, medications, and discharge instructions Progressing        METABOLIC, FLUID AND ELECTROLYTES - ADULT     Electrolytes maintained within normal limits Progressing     Fluid balance maintained Progressing        Nutrition/Hydration-ADULT     Nutrient/Hydration intake appropriate for improving, restoring or maintaining nutritional needs Progressing        PAIN - ADULT     Verbalizes/displays adequate comfort level or baseline comfort level Progressing        Potential for Falls     Patient will remain free of falls Progressing        Prexisting or High Potential for Compromised Skin Integrity     Skin integrity is maintained or improved Progressing        SAFETY ADULT     Patient will remain free of falls Progressing        SAFETY,RESTRAINT: NV/NON-SELF DESTRUCTIVE BEHAVIOR     Remains free of harm/injury (restraint for non violent/non self-detsructive behavior) Progressing     Returns to optimal restraint-free functioning Progressing

## 2019-02-07 NOTE — PROGRESS NOTES
Progress Note - Infectious Disease   Santy Wasserman 60 y o  male MRN: [id number]  [id number]      Impression/Plan:  1  Severe sepsis/septic shock   Fever, tachycardia, leukocytosis, hypotension   Also with component of cardiogenic shock  Likely precipitated by MSSA bacteremia   No other clear evidence of acute infection identified   Fevers, procalcitonin improved  Otherwise patient clinically much improved with clearance of bacteremia  Leukocytosis normalized on labs today      -antibiotic plan as below  -monitor temperatures and hemodynamics  -supportive care as per ICU       2  MSSA bacteremia   Strong possibility of endocarditis in the setting of bioprosthetic AVR   CHON with no evidence of valvular vegetations   Initial portal of entry may have been related to multiple upper back wounds   CT chest/abdomen/pelvis with no other evidence of acute infection   Possible from pneumonia as sputum culture was positive for MSSA   Bacteremia eventually cleared  Patient's toes noted to be tender to palpation with discoloration on exam   Appreciate podiatry re-evaluation, no concern for ongoing infection in the toes     -continue with renally dosed IV cefazolin   -will require prolonged course of IV antibiotic of likely 6 weeks in the setting of prolonged bacteremia and bioprosthetic AVR, which can be dosed with hemodialysis if he remains on it; through 3/10     3  History of bioprosthetic AVR   Secondary to degenerative AS   Placed in July 2014  Justina Maki no evidence of endocarditis      4  Acute kidney injury   Likely ischemic/septic ATN    Patient remains on hemodialysis   He is status post PermCath placement   -antibiotics dosed for renal dysfunction  -ongoing follow-up by Nephrology  -antibiotics can be dosed with hemodialysis if he remains on it at discharge     5   Acute hypoxic respiratory failure   Likely in the setting of septic shock as well as acute systolic cardiomyopathy/volume overload   Recent CT chest with no evidence to suggest pulmonary infection   Most recent chest x-ray continues to suggest significant volume overload  Patient has been transitioned to high-flow nasal cannula and appears to be doing well      -monitor secretions  -monitor O2 requirements  -ongoing supportive care as per ICU      Above plan discussed in detail with critical care AP     ID consult service will continue to follow  Antibiotics:  Ancef    24 hr events:  Patient remained on BiPAP  No other acute events noted overnight on chart review  White blood cell count has normalized to 9 3  Patient is afebrile  No new micro data  No new images overnight  Patient's other vitals are stable    Subjective:  Patient seen earlier this morning  He is awake and on high-flow nasal cannula  He is tearful on exam and he is concerned that he may be dying  He can't recall how long he has been here but knows that he became very sick and has been in the ICU for very long  Objective:  Vitals:  Temp:  [97 5 °F (36 4 °C)-98 5 °F (36 9 °C)] 97 5 °F (36 4 °C)  HR:  [] 112  Resp:  [20-36] 27  BP: ()/(56-79) 152/67  SpO2:  [48 %-100 %] 100 %  Temp (24hrs), Av 2 °F (36 8 °C), Min:97 5 °F (36 4 °C), Max:98 5 °F (36 9 °C)  Current: Temperature: 97 5 °F (36 4 °C)    Physical Exam:   General Appearance:  Alert, interactive, nontoxic, no acute distress  Throat: Oropharynx moist without lesions  Lungs:   Decreased breath sounds throughout; no wheezes, rhonchi or rales; respirations unlabored on high-flow nasal cannula   Heart:  RRR; no murmur, rub or gallop though distant heart sounds   Abdomen:   Soft, non-tender, non-distended, positive bowel sounds  Obese abdomen   Extremities: No clubbing, cyanosis; ongoing edema in the upper lower extremities  Skin: No new rashes or lesions  No new draining wounds noted         Labs, Imaging, & Other studies:   All pertinent labs and imaging studies were personally reviewed    Results from last 7 days  Lab Units 02/07/19  0455 02/06/19  0458 02/05/19  0541   WBC Thousand/uL 9 37 11 06* 10 20*   HEMOGLOBIN g/dL 7 6* 7 8* 7 7*   PLATELETS Thousands/uL 191 172 145*       Results from last 7 days  Lab Units 02/07/19  0455  02/01/19  0605   POTASSIUM mmol/L 4 4  < > 4 5   CHLORIDE mmol/L 99*  < > 104   CO2 mmol/L 26  < > 23   BUN mg/dL 62*  < > 35*   CREATININE mg/dL 4 43*  < > 2 13*   EGFR ml/min/1 73sq m 14  < > 33   CALCIUM mg/dL 7 7*  < > 8 4   AST U/L  --   --  80*   ALT U/L  --   --  19   ALK PHOS U/L  --   --  108   < > = values in this interval not displayed

## 2019-02-07 NOTE — PHYSICAL THERAPY NOTE
Physical Therapy Progress Note     02/07/19 0905   Pain Assessment   Pain Assessment No/denies pain   Restrictions/Precautions   Other Precautions Cognitive;Multiple lines;Telemetry;O2;Fall Risk   General   Chart Reviewed Yes   Additional Pertinent History /87, pulse range from 110's at rest to 145 with exercise  spO2 100%, resp rate at rest high 20's and with exercise low 40's   Family/Caregiver Present No   Cognition   Overall Cognitive Status Impaired   Orientation Level Oriented to person;Oriented to place   Following Commands Follows one step commands with increased time or repetition   Subjective   Subjective tearful about current medical situation and prolonged illness  Bed Mobility   Rolling R 1  Dependent   Rolling L 1  Dependent   Endurance Deficit   Endurance Deficit Yes   Endurance Deficit Description tachycardia and tachypnea   Activity Tolerance   Activity Tolerance Treatment limited secondary to medical complications (Comment)   Nurse Made Aware yes   Exercises   UE Exercise (sh elevation and biceps/triceps,   3 reps each)   TKR Supine;AAROM; Bilateral  (3 reps with rest between sets)   Neuro re-ed reach across body to initiate rolling   Assessment   Prognosis Guarded   Problem List Decreased strength;Decreased range of motion;Decreased endurance; Impaired balance;Decreased mobility; Decreased cognition; Impaired judgement;Decreased safety awareness; Obesity; Decreased skin integrity   Assessment pt currenlty on hfnc, supine in bed with hob elevated  pt performed 3 reps ther ex bue and ble  pt needed mod to max assist with all exercies  pt noted to have elevatin in heart rate and resp rate with each set of 3 reps requiring prlonged recovery  pt is moderately edematous  pt not ready to attempt sitting on edge of bed due to cardiovascular response to ther ex  pt will need continued PT and rehab     Barriers to Discharge (medical status )   Goals   Patient Goals get strength back   STG Expiration Date 02/19/19   Treatment Day 1   Plan   Treatment/Interventions Functional transfer training;LE strengthening/ROM; Therapeutic exercise; Endurance training;Cognitive reorientation;Patient/family training;Equipment eval/education; Bed mobility;Spoke to nursing   Progress Slow progress, medical status limitations   PT Frequency (3-5x/wk)   Recommendation   Recommendation Post acute IP rehab   PT - OK to Discharge Elise Marquez, PT

## 2019-02-07 NOTE — PLAN OF CARE
Problem: PHYSICAL THERAPY ADULT  Goal: Performs mobility at highest level of function for planned discharge setting  See evaluation for individualized goals  Treatment/Interventions: LE strengthening/ROM, Therapeutic exercise, Endurance training, Patient/family training, Cognitive reorientation, Equipment eval/education, Bed mobility, Spoke to advanced practitioner, Spoke to case management, Spoke to nursing  Equipment Recommended:  (TBD- may benefit from [facility] bed)       See flowsheet documentation for full assessment, interventions and recommendations  Outcome: Not Progressing  Prognosis: Guarded  Problem List: Decreased strength, Decreased range of motion, Decreased endurance, Impaired balance, Decreased mobility, Decreased cognition, Impaired judgement, Decreased safety awareness, Obesity, Decreased skin integrity  Assessment: pt currenlty on hfnc, supine in bed with hob elevated  pt performed 3 reps ther ex bue and ble  pt needed mod to max assist with all exercies  pt noted to have elevatin in heart rate and resp rate with each set of 3 reps requiring prlonged recovery  pt is moderately edematous  pt not ready to attempt sitting on edge of bed due to cardiovascular response to ther ex  pt will need continued PT and rehab  Barriers to Discharge:  (medical status )     Recommendation: Post acute IP rehab     PT - OK to Discharge: No    See flowsheet documentation for full assessment

## 2019-02-07 NOTE — OCCUPATIONAL THERAPY NOTE
[address]  Evaluation     Patient Name: Marielena GALVAN Date: 2/7/2019  Problem List  Patient Active Problem List   Diagnosis    Aortic stenosis, mild    Essential hypertension    Hyperlipidemia    Left bundle branch block    Increased BMI    Murmur    Osteoarthritis of both knees    Pericardial disease    Sciatica    Age-related cataract of right eye    Venous insufficiency    Bilateral leg edema    [facility]STEMI [facility]non-ST elevated [facility] infarction) [facility]    PATRICK (acute kidney injury) ([facility]    Stress-induced cardiomyopathy    Acute respiratory failure with hypoxia ([facility] )    History of aortic valve replacement with bioprosthetic valve    Atrial fibrillation (HCC)    Cardiogenic shock (HCC)    Septic shock (HCC)    Staphylococcus aureus bacteremia    Transaminitis    Thrombocytopenia (HCC)    Anemia    Leukocytosis     Past Medical History  Past Medical History:   Diagnosis Date    Allergic rhinitis     last assessed 9/12/12    Finger fracture, right     Closed fx of the middle phalanx of the right 5th finger  last assessed 1/30/14    Hemorrhoids     last assessed 2/10/14    Hyperlipidemia     Hypertension      Past Surgical History  Past Surgical History:   Procedure Laterality Date    AORTIC VALVE REPLACEMENT  07/30/2014    AVR with 25mm [facility] Ease bovine pericardial valve    CARDIAC CATHETERIZATION  07/18/2014    SLB left main-normal, circumflex-normal, ramus intermedius-normal, RCA was large and dominant giving rise to the PDA & a large posterolateral branch  No disease  last assessed 8/19/14     LUPE LAST HSPTL PLACEMENT  2/4/2019    KNEE CARTILAGE SURGERY Left     medial meniscus tear     TESTICLE SURGERY      TONSILLECTOMY AND ADENOIDECTOMY      TOOTH EXTRACTION             02/07/19 1403   Note Type   Note type Eval/Treat   Restrictions/Precautions   Weight Bearing Precautions Per Order No   Other Precautions Cognitive; Bed Alarm;Multiple lines;Telemetry;O2;Fall Risk;Pain;Aspiration   Pain Assessment   Pain Assessment 0-10   Pain Score 5   Pain Type Acute pain   Pain Location Leg   Pain Orientation Bilateral   Pain Descriptors Aching;Discomfort   Pain Rating: FLACC (Rest) - Face 0   Pain Rating: FLACC (Rest) - Legs 0   Pain Rating: FLACC (Rest) - Activity 0   Pain Rating: FLACC (Rest) - Cry 0   Pain Rating: FLACC (Rest) - Consolability 0   Score: FLACC (Rest) 0   Pain Rating: FLACC (Activity) - Face 0   Pain Rating: FLACC (Activity) - Legs 0   Pain Rating: FLACC (Activity) - Activity 0   Pain Rating: FLACC (Activity) - Cry 0   Pain Rating: FLACC (Activity) - Consolability 0   Score: FLACC (Activity) 0   Home Living   Type of Home House   Home Layout Other (Comment); Multi-level;Bed/bath upstairs  (5 BRENT; full bath on main floor)   Bathroom Shower/Tub Tub/shower unit   Bathroom Toilet Standard   Bathroom Equipment Grab bars in [place] [id number]  (used as needed)   Additional Comments Pt reports living in 2 SH, 5 BRENT, 1/2 bath 2nd floor, main bath 1st floor   Prior Function   Level of [place] Independent with ADLs and functional mobility  (except for B/L LE boots)   Lives With Spouse; Other (Comment)  (MIL)   Receives Help From Family   ADL Assistance Independent   IADLs Independent   Falls in the last 6 months 0   Vocational Full time employment   Comments Pt reports being I w/ ADLS (except boots), I IADLS, and Mod I / I transfers and functional mobility w/ occasional use of RW   Lifestyle   Autonomy I ADLS, IADLS, transfers and functional mobility PTA   Reciprocal Relationships Pt lives w/ spouse and mother in law   Service to Others Pt works full time as a     Intrinsic Gratification pt likes trains   Psychosocial   Psychosocial (WDL) WDL   Length of Time/Family Visitation 16-30 min  (spouse, mother in law, and dght in law)   ADL   Eating Assistance 4  [address]  (spouse feeding pt upon entrance into room)   Grooming Assistance 2  Maximal Assistance   UB Bathing Assistance 2  Maximal Assistance   LB Bathing Assistance 1  Total Assistance   UB Dressing Assistance 2  Maximal Assistance   LB Dressing Assistance 1  Total [address]  1  Total Assistance   Functional Assistance 1  Total Assistance   Bed Mobility   Rolling R 1  Dependent   Rolling L 1  Dependent   Supine to Sit 1  Dependent   Additional Comments Pt rolled L and R w/ total A; and required total A for attempted long sit in bed  Pt attempted to utilize bed rails for assist into sitting but pt unable to reach 2* to weakness/fatigue   Transfers   Sit to Stand Unable to assess   Stand to Sit Unable to assess   Additional Comments not appropriate at this time   Functional Mobility   Additional Comments Unable to assess- not appropriate at this time   Balance   Static Sitting Zero   Activity Tolerance   Activity Tolerance Patient limited by fatigue;Patient limited by pain   Nurse Made Aware yes, [facility]   RUE Assessment   RUE Assessment X  (1/5 shlder; elbow 2-/5,  2/5)   LUE Assessment   LUE Assessment X  (1/5 shlder; elbow 2-/5,  2/5)   Hand Function   Gross Motor Coordination Impaired   Fine Motor Coordination Impaired   Cognition   Overall Cognitive Status Impaired   Arousal/Participation Responsive; Cooperative   Attention Attends with cues to redirect   Orientation Level Oriented to person;Oriented to place;Oriented to time;Disoriented to situation   Memory Decreased short term memory;Decreased recall of recent events;Decreased recall of precautions   Following Commands Follows one step commands with increased time or repetition   Comments pt is pleasant and cooperative; requries +VC for safety and has decreased insight into deficits   Assessment   Limitation Decreased ADL status; Decreased UE ROM; Decreased UE strength;Decreased Safe judgement during ADL;Decreased cognition;Decreased endurance;Decreased fine motor control;Decreased self-care trans;Decreased high-level ADLs   Prognosis Fair   Assessment Pt is a 61 y/o male seen for OT eval s/p adm to SLB w/ worsening malaise and weakness  Pt developed severe respiratory distress and was intubated  Pt is dx'd w/ cardiogenic shock, NSTEMI, PATRICK, cardiomyopathy, acute respiratory failure w/ hypoxia, afib, sepsis, thrombocytopenia, anemia and leukocytosis  Comorbidities include a h/o allergic rhinitis, finger fracture, hemorrhoids, HLD, HTN  Pt with active OT orders  Pt lives with spouse and mother in law in 1 SH, 5 BRENT, main bath 1st floor, bed 2nd floor w/ 1/2 bath  Pt was I w/ ADLS (except for donning shoes) and IADLS, drove, & required no use of DME PTA but has a RW if needed  Pt is currently demonstrating the following occupational deficits: Max A UB ADLS, Total A LB ADLS, Total A bed mobility  Not appropriate for transfers/functional mobility at this time  These deficits that are impacting pt's baseline areas of occupation are a result of the following impairments: pain, endurance, activity tolerance, functional mobility, forward functional reach, balance, trunk control, functional standing tolerance, unsupportive home environment, decreased I w/ ADLS/IADLS, strength, ROM, cognitive impairments, decreased safety awareness, decreased insight into deficits and impaired fine motor skills  The following Occupational Performance Areas to address include: eating, grooming, bathing/shower, toilet hygiene, dressing, medication management, socialization, health maintenance, functional mobility, community mobility, clothing management, cleaning, household maintenance and job performance/volunteering  Pt scored overall 5/100 on the Barthel Index  Based on the aforementioned OT evaluation, functional performance deficits, and assessments, pt has been identified as a high complexity evaluation  Recommend STR upon D/C, when medically stable   Pt to continue to benefit from acute immediate OT services to address the following goals 3-5x/week to  w/in 10-14 days:    Goals   Patient Goals to get stronger to be able to go back to work   LTG Time Frame 10-14   Long Term Goal #1 see below listed goals   Plan   Treatment Interventions ADL retraining;Functional transfer training;UE strengthening/ROM; Endurance training;Cognitive reorientation;Patient/family training;Equipment evaluation/education; Fine motor coordination activities; Compensatory technique education;Continued evaluation; Energy conservation; Activityengagement   Goal Expiration Date 19   OT Frequency 3-5x/wk   Recommendation   OT Discharge Recommendation Short Term Rehab   OT - OK to Discharge Yes  (when medically stable)   Barthel Index   Feeding 5   Bathing 0   Grooming Score 0   Dressing Score 0   Bladder Score 0   Bowels Score 0   Toilet Use Score 0   Transfers (Bed/Chair) Score 0   Mobility (Level Surface) Score 0   Stairs Score 0   Barthel Index Score 5   Modified [place] Scale   Modified [place] Scale 5      GOALS    1) Pt will complete rolling left/right in bed with Mod assist, as prerequisite for further engagement in ADLS   2) Pt will complete supine to sit transfer with Mod A using B/L UEs to initiate bed mobility   3) Pt will tolerate sitting at EOB 20 minutes with Mod assist and stable vital signs, as prerequisite for further engagement in ADLS   4) Pt will complete grooming task with Mod assist and increased time to increase independence in functional tasks  5) Pt will increase B/L UE ROM 1/2 MMT to increase functional UB use during ADLS   6) Pt will complete UB ADLS with Mod A and use of AD/DME as needed to increase independence in functional tasks  7) Pt will complete LB ADLS with Mod A and use of AD/DME as needed to increase independence in functional tasks  8) Pt will follow 100% simple one step verbal commands and be A/Ox4 consistently t/o use of external environmental cues to increased awareness for functional tasks  9) Pt will demonstrate 75% carryover of E C  techniques t/o fx'l I/ADL/ leisure tasks w/o cues s/p skilled education  10) Pt will tolerate PROM/AROM/AAROM in all planes w/ G participation to improve fx'l UB use as prerequisite for I/ADL/leisure to increase strength for functional tasks    ** OTR TO ASSESS TRANSFERS AND FUNCTIONAL MOBILITY AS APPROPRIATE    Radha Gomez MS, OTR/L

## 2019-02-07 NOTE — PROGRESS NOTES
NEPHROLOGY PROGRESS NOTE    Rell Moran 60 y o  male MRN: [id number]  Unit/Bed#: MICU 11 Encounter: [id number]  Reason for Consult:  Acute renal failure    Patient remains extubated and has little bit of anxious  No distress  ASSESSMENT/PLAN:  1  Renal    The patient has acute renal failure due to ATN in the still dialysis dependent  He remains extubated and has some gross volume overload  I am going to plan extra ultrafiltration today for volume removal and continue with dialysis   Hopefully helping reduce volume will help maintain good respiratory status with improvement as well  As of now no signs of improvement with no significant urine output  Plan ultrafiltration for 2 5 L negative as tolerated  Hemodialysis tomorrow    2  Status post cardiogenic shock  SUBJECTIVE:  Review of Systems   Constitution: Positive for weakness  Negative for chills and fever  HENT: Negative  Eyes: Negative  Cardiovascular: Positive for leg swelling and orthopnea  Negative for chest pain  Respiratory: Negative for cough and shortness of breath  Gastrointestinal: Negative  OBJECTIVE:  Current Weight: Weight - Scale: (!) 172 kg (379 lb 3 1 oz)  Vitals:Temp (24hrs), Av 2 °F (36 8 °C), Min:97 5 °F (36 4 °C), Max:98 5 °F (36 9 °C)  Current: Temperature: 97 5 °F (36 4 °C)   Blood pressure 131/84, pulse (!) 144, temperature 97 5 °F (36 4 °C), temperature source Oral, resp  rate (!) 33, height 5' 9" (1 753 m), weight (!) 172 kg (379 lb 3 1 oz), SpO2 100 %  , Body mass index is 56 kg/m²        Intake/Output Summary (Last 24 hours) at 19 1117  Last data filed at 19 1030   Gross per 24 hour   Intake          1947 07 ml   Output             2000 ml   Net           -52 93 ml       Physical Exam: /84   Pulse (!) 144   Temp 97 5 °F (36 4 °C) (Oral)   Resp (!) 33   Ht 5' 9" (1 753 m)   Wt (!) 172 kg (379 lb 3 1 oz)   SpO2 100%   BMI 56 00 kg/m²   Physical Exam Constitutional: No distress  HENT:   Mouth/Throat: No oropharyngeal exudate  Neck: No JVD present  Cardiovascular: Normal rate  Exam reveals no friction rub  Irregular rhythm   Pulmonary/Chest:   Decreased breath sounds bases  Abdominal: Soft  Bowel sounds are normal  He exhibits no distension  There is no tenderness  There is no rebound         Medications:    Current Facility-Administered Medications:     acetaminophen (TYLENOL) oral suspension 650 mg, 650 mg, Oral, Q6H PRN, Cindy George MD, 650 mg at 01/24/19 1946    albuterol inhalation solution 2 5 mg, 2 5 mg, Nebulization, Q4H PRN, Kiki Celis PA-C    amiodarone (CORDARONE) 900 mg in dextrose 5 % 500 mL infusion, 1 mg/min, Intravenous, Continuous, Heather Hernandez DO    bisacodyl (DULCOLAX) rectal suppository 10 mg, 10 mg, Rectal, Daily PRN, Kiki Celis PA-C    calcium acetate (PHOSLO) capsule 1,334 mg, 1,334 mg, Oral, BID, Ayanna Anderson MD, 1,334 mg at 02/07/19 0804    ceFAZolin (ANCEF) 1 g in sodium chloride 0 9% 50 ml IVPB, 1,000 mg, Intravenous, Q24H, Oneyda Anne MD, Last Rate: 100 mL/hr at 02/06/19 1724, 1,000 mg at 02/06/19 1724    chlorhexidine (PERIDEX) 0 12 % oral rinse 15 mL, 15 mL, [employer], Q12H [address], Kiki Celis PA-C, 15 mL at 02/06/19 2109    dexmedetomidine (PRECEDEX) 200 mcg in sodium chloride 0 9 % 50 mL infusion, 0 1-0 7 mcg/kg/hr, Intravenous, Titrated, Cindy George MD, Stopped at 02/07/19 0606    heparin (porcine) 25,000 units in 250 mL infusion (premix), 3-30 Units/kg/hr (Order-Specific), Intravenous, Titrated, Sabra Rogers PA-C, Last Rate: 24 4 mL/hr at 02/07/19 0957, 27 1 Units/kg/hr at 02/07/19 0957    HYDROmorphone (DILAUDID) injection 1 mg, 1 mg, Intravenous, Q3H PRN, [facility], 1 mg at 02/04/19 1819    insulin lispro (HumaLOG) 100 units/mL subcutaneous injection 5-25 Units, 5-25 Units, Subcutaneous, Q6H [address], 5 Units at 02/05/19 1744 **AND** Fingerstick Glucose (POCT), , , Q6H, Yany Paul PA-C    ipratropium (ATROVENT) 0 02 % inhalation solution 0 5 mg, 0 5 mg, Nebulization, Q6H, Toña Lucio PA-C, 0 5 mg at 02/07/19 0743    levalbuterol Kim Guard) inhalation solution 1 25 mg, 1 25 mg, Nebulization, Q6H, Toña Lucio PA-C, 1 25 mg at 02/07/19 0743    metoprolol (LOPRESSOR) injection 2 5 mg, 2 5 mg, Intravenous, Q6H PRN, Epifanio Lawson MD, 2 5 mg at 02/07/19 0806    metoprolol tartrate (LOPRESSOR) tablet 25 mg, 25 mg, Oral, Q12H [address], Nav Ferguson PA-C    midodrine (PROAMATINE) tablet 5 mg, 5 mg, Oral, TID AC, KATHY South, 5 mg at 02/07/19 0802    nystatin (MYCOSTATIN) powder, , Topical, BID, Yany Paul PA-C, 1 application at 22/24/40 0810    oxyCODONE (ROXICODONE) oral solution 10 mg, 10 mg, Oral, Q4H PRN, Lidya Martinez, 10 mg at 02/04/19 0821    polyethylene glycol (MIRALAX) packet 17 g, 17 g, Oral, Daily, KATHY Mathias, 17 g at 02/06/19 7688    polyvinyl alcohol (LIQUIFILM TEARS) 1 4 % ophthalmic solution 1 drop, 1 drop, Both Eyes, Q3H PRN, Toña Lucio PA-C, 1 drop at 02/07/19 0813    predniSONE tablet 40 mg, 40 mg, Oral, Daily, [employer], PA-C, 40 mg at 02/07/19 0809    QUEtiapine (SEROquel) tablet 25 mg, 25 mg, Oral, HS, Toña Lucio PA-C, Stopped at 02/06/19 2106    senna 8 8 mg/5 mL oral syrup 17 6 mg, 17 6 mg, Oral, BID, KATHY Wade, Stopped at 02/06/19 2106    Laboratory Results:  Lab Results   Component Value Date    WBC 9 37 02/07/2019    HGB 7 6 (L) 02/07/2019    HCT 24 8 (L) 02/07/2019    MCV 87 02/07/2019     02/07/2019     Lab Results   Component Value Date    GLUCOSE 92 04/09/2015    CALCIUM 7 7 (L) 02/07/2019     04/09/2015    K 4 4 02/07/2019    CO2 26 02/07/2019    CL 99 (L) 02/07/2019    BUN 62 (H) 02/07/2019    CREATININE 4 43 (H) 02/07/2019     Lab Results   Component Value Date    CALCIUM 7 7 (L) 02/07/2019    PHOS 7 3 (H) 02/07/2019     No results found for:  LABPROT

## 2019-02-07 NOTE — HEMODIALYSIS
Patient had 2 hour UF only HD treatment today  Heart rate elevated in 140's, started on Amiodarone  Removed 2500mL  Patient tolerated treatment well

## 2019-02-08 ENCOUNTER — APPOINTMENT (INPATIENT)
Dept: RADIOLOGY | Facility: HOSPITAL | Age: 59
DRG: 871 | End: 2019-02-08
Payer: COMMERCIAL

## 2019-02-08 ENCOUNTER — APPOINTMENT (INPATIENT)
Dept: DIALYSIS | Facility: HOSPITAL | Age: 59
DRG: 871 | End: 2019-02-08
Attending: INTERNAL MEDICINE
Payer: COMMERCIAL

## 2019-02-08 PROBLEM — R57.0 CARDIOGENIC SHOCK (HCC): Status: RESOLVED | Noted: 2019-01-24 | Resolved: 2019-02-08

## 2019-02-08 PROBLEM — N18.6 ESRD (END STAGE RENAL DISEASE) (HCC): Status: ACTIVE | Noted: 2019-01-23

## 2019-02-08 PROBLEM — A41.9 SEPTIC SHOCK (HCC): Status: RESOLVED | Noted: 2019-01-24 | Resolved: 2019-02-08

## 2019-02-08 PROBLEM — R65.21 SEPTIC SHOCK (HCC): Status: RESOLVED | Noted: 2019-01-24 | Resolved: 2019-02-08

## 2019-02-08 LAB
ANION GAP SERPL CALCULATED.3IONS-SCNC: 13 MMOL/L (ref 4–13)
ANISOCYTOSIS BLD QL SMEAR: PRESENT
APTT PPP: 110 SECONDS (ref 26–38)
APTT PPP: 79 SECONDS (ref 26–38)
APTT PPP: 81 SECONDS (ref 26–38)
BASOPHILS # BLD MANUAL: 0 THOUSAND/UL (ref 0–0.1)
BASOPHILS NFR MAR MANUAL: 0 % (ref 0–1)
BUN SERPL-MCNC: 87 MG/DL (ref 5–25)
CALCIUM SERPL-MCNC: 8.1 MG/DL (ref 8.3–10.1)
CHLORIDE SERPL-SCNC: 94 MMOL/L (ref 100–108)
CO2 SERPL-SCNC: 23 MMOL/L (ref 21–32)
CREAT SERPL-MCNC: 5.84 MG/DL (ref 0.6–1.3)
EOSINOPHIL # BLD MANUAL: 0 THOUSAND/UL (ref 0–0.4)
EOSINOPHIL NFR BLD MANUAL: 0 % (ref 0–6)
ERYTHROCYTE [DISTWIDTH] IN BLOOD BY AUTOMATED COUNT: 18.2 % (ref 11.6–15.1)
GFR SERPL CREATININE-BSD FRML MDRD: 10 ML/MIN/1.73SQ M
GLUCOSE SERPL-MCNC: 102 MG/DL (ref 65–140)
GLUCOSE SERPL-MCNC: 108 MG/DL (ref 65–140)
GLUCOSE SERPL-MCNC: 115 MG/DL (ref 65–140)
GLUCOSE SERPL-MCNC: 122 MG/DL (ref 65–140)
GLUCOSE SERPL-MCNC: 126 MG/DL (ref 65–140)
HCT VFR BLD AUTO: 25.8 % (ref 36.5–49.3)
HGB BLD-MCNC: 7.8 G/DL (ref 12–17)
INR PPP: 1.34 (ref 0.86–1.17)
LYMPHOCYTES # BLD AUTO: 0.26 THOUSAND/UL (ref 0.6–4.47)
LYMPHOCYTES # BLD AUTO: 2 % (ref 14–44)
MAGNESIUM SERPL-MCNC: 2.8 MG/DL (ref 1.6–2.6)
MCH RBC QN AUTO: 26.4 PG (ref 26.8–34.3)
MCHC RBC AUTO-ENTMCNC: 30.2 G/DL (ref 31.4–37.4)
MCV RBC AUTO: 88 FL (ref 82–98)
MONOCYTES # BLD AUTO: 0.39 THOUSAND/UL (ref 0–1.22)
MONOCYTES NFR BLD: 3 % (ref 4–12)
NEUTROPHILS # BLD MANUAL: 12.39 THOUSAND/UL (ref 1.85–7.62)
NEUTS SEG NFR BLD AUTO: 95 % (ref 43–75)
NRBC BLD AUTO-RTO: 0 /100 WBCS
PLATELET # BLD AUTO: 260 THOUSANDS/UL (ref 149–390)
PLATELET BLD QL SMEAR: ADEQUATE
PMV BLD AUTO: 11.8 FL (ref 8.9–12.7)
POTASSIUM SERPL-SCNC: 4.6 MMOL/L (ref 3.5–5.3)
PROTHROMBIN TIME: 16.7 SECONDS (ref 11.8–14.2)
RBC # BLD AUTO: 2.95 MILLION/UL (ref 3.88–5.62)
RBC MORPH BLD: PRESENT
SODIUM SERPL-SCNC: 130 MMOL/L (ref 136–145)
WBC # BLD AUTO: 13.04 THOUSAND/UL (ref 4.31–10.16)

## 2019-02-08 PROCEDURE — 90935 HEMODIALYSIS ONE EVALUATION: CPT | Performed by: INTERNAL MEDICINE

## 2019-02-08 PROCEDURE — 97110 THERAPEUTIC EXERCISES: CPT

## 2019-02-08 PROCEDURE — 71045 X-RAY EXAM CHEST 1 VIEW: CPT

## 2019-02-08 PROCEDURE — 99232 SBSQ HOSP IP/OBS MODERATE 35: CPT | Performed by: INTERNAL MEDICINE

## 2019-02-08 PROCEDURE — 82948 REAGENT STRIP/BLOOD GLUCOSE: CPT

## 2019-02-08 PROCEDURE — 85730 THROMBOPLASTIN TIME PARTIAL: CPT | Performed by: INTERNAL MEDICINE

## 2019-02-08 PROCEDURE — 97112 NEUROMUSCULAR REEDUCATION: CPT | Performed by: PHYSICAL THERAPIST

## 2019-02-08 PROCEDURE — 85027 COMPLETE CBC AUTOMATED: CPT | Performed by: PHYSICIAN ASSISTANT

## 2019-02-08 PROCEDURE — 85610 PROTHROMBIN TIME: CPT | Performed by: PHYSICIAN ASSISTANT

## 2019-02-08 PROCEDURE — 94640 AIRWAY INHALATION TREATMENT: CPT

## 2019-02-08 PROCEDURE — 85007 BL SMEAR W/DIFF WBC COUNT: CPT | Performed by: PHYSICIAN ASSISTANT

## 2019-02-08 PROCEDURE — 97530 THERAPEUTIC ACTIVITIES: CPT | Performed by: PHYSICAL THERAPIST

## 2019-02-08 PROCEDURE — 83735 ASSAY OF MAGNESIUM: CPT | Performed by: PHYSICIAN ASSISTANT

## 2019-02-08 PROCEDURE — 97535 SELF CARE MNGMENT TRAINING: CPT

## 2019-02-08 PROCEDURE — 97110 THERAPEUTIC EXERCISES: CPT | Performed by: PHYSICAL THERAPIST

## 2019-02-08 PROCEDURE — 80048 BASIC METABOLIC PNL TOTAL CA: CPT | Performed by: PHYSICIAN ASSISTANT

## 2019-02-08 PROCEDURE — 94760 N-INVAS EAR/PLS OXIMETRY 1: CPT

## 2019-02-08 RX ORDER — METOPROLOL TARTRATE 50 MG/1
50 TABLET, FILM COATED ORAL EVERY 12 HOURS SCHEDULED
Status: DISCONTINUED | OUTPATIENT
Start: 2019-02-08 | End: 2019-02-09

## 2019-02-08 RX ORDER — WARFARIN SODIUM 5 MG/1
5 TABLET ORAL
Status: COMPLETED | OUTPATIENT
Start: 2019-02-08 | End: 2019-02-08

## 2019-02-08 RX ORDER — AMIODARONE HYDROCHLORIDE 200 MG/1
200 TABLET ORAL
Status: DISCONTINUED | OUTPATIENT
Start: 2019-02-08 | End: 2019-02-20

## 2019-02-08 RX ORDER — WARFARIN SODIUM 5 MG/1
5 TABLET ORAL
Status: DISCONTINUED | OUTPATIENT
Start: 2019-02-08 | End: 2019-02-08

## 2019-02-08 RX ADMIN — OXYCODONE HYDROCHLORIDE 10 MG: 10 TABLET ORAL at 19:47

## 2019-02-08 RX ADMIN — CALCIUM ACETATE 1334 MG: 667 CAPSULE ORAL at 18:22

## 2019-02-08 RX ADMIN — METOPROLOL TARTRATE 5 MG: 5 INJECTION, SOLUTION INTRAVENOUS at 17:06

## 2019-02-08 RX ADMIN — GUAIFENESIN 600 MG: 600 TABLET, EXTENDED RELEASE ORAL at 09:36

## 2019-02-08 RX ADMIN — HEPARIN SODIUM AND DEXTROSE 27.1 UNITS/KG/HR: 10000; 5 INJECTION INTRAVENOUS at 05:21

## 2019-02-08 RX ADMIN — IPRATROPIUM BROMIDE 0.5 MG: 0.5 SOLUTION RESPIRATORY (INHALATION) at 21:09

## 2019-02-08 RX ADMIN — AMIODARONE HYDROCHLORIDE 1 MG/MIN: 50 INJECTION, SOLUTION INTRAVENOUS at 03:20

## 2019-02-08 RX ADMIN — LEVALBUTEROL 1.25 MG: 1.25 SOLUTION, CONCENTRATE RESPIRATORY (INHALATION) at 13:42

## 2019-02-08 RX ADMIN — METOPROLOL TARTRATE 50 MG: 50 TABLET, FILM COATED ORAL at 22:09

## 2019-02-08 RX ADMIN — AMIODARONE HYDROCHLORIDE 200 MG: 200 TABLET ORAL at 10:47

## 2019-02-08 RX ADMIN — HEPARIN SODIUM AND DEXTROSE 25.1 UNITS/KG/HR: 10000; 5 INJECTION INTRAVENOUS at 15:13

## 2019-02-08 RX ADMIN — CEFAZOLIN SODIUM 1000 MG: 10 INJECTION, POWDER, FOR SOLUTION INTRAVENOUS at 17:56

## 2019-02-08 RX ADMIN — LEVALBUTEROL 1.25 MG: 1.25 SOLUTION, CONCENTRATE RESPIRATORY (INHALATION) at 21:08

## 2019-02-08 RX ADMIN — STANDARDIZED SENNA CONCENTRATE 8.6 MG: 8.6 TABLET ORAL at 22:09

## 2019-02-08 RX ADMIN — IPRATROPIUM BROMIDE 0.5 MG: 0.5 SOLUTION RESPIRATORY (INHALATION) at 01:07

## 2019-02-08 RX ADMIN — NYSTATIN: 100000 POWDER TOPICAL at 09:38

## 2019-02-08 RX ADMIN — CALCIUM ACETATE 1334 MG: 667 CAPSULE ORAL at 09:36

## 2019-02-08 RX ADMIN — NYSTATIN 1 APPLICATION: 100000 POWDER TOPICAL at 18:00

## 2019-02-08 RX ADMIN — GUAIFENESIN 600 MG: 600 TABLET, EXTENDED RELEASE ORAL at 22:09

## 2019-02-08 RX ADMIN — PREDNISONE 40 MG: 20 TABLET ORAL at 09:36

## 2019-02-08 RX ADMIN — QUETIAPINE FUMARATE 25 MG: 25 TABLET ORAL at 22:09

## 2019-02-08 RX ADMIN — IPRATROPIUM BROMIDE 0.5 MG: 0.5 SOLUTION RESPIRATORY (INHALATION) at 07:19

## 2019-02-08 RX ADMIN — WARFARIN SODIUM 5 MG: 5 TABLET ORAL at 18:22

## 2019-02-08 RX ADMIN — LEVALBUTEROL 1.25 MG: 1.25 SOLUTION, CONCENTRATE RESPIRATORY (INHALATION) at 01:07

## 2019-02-08 RX ADMIN — POLYETHYLENE GLYCOL 3350 17 G: 17 POWDER, FOR SOLUTION ORAL at 09:36

## 2019-02-08 RX ADMIN — IPRATROPIUM BROMIDE 0.5 MG: 0.5 SOLUTION RESPIRATORY (INHALATION) at 13:42

## 2019-02-08 RX ADMIN — LEVALBUTEROL 1.25 MG: 1.25 SOLUTION, CONCENTRATE RESPIRATORY (INHALATION) at 07:19

## 2019-02-08 NOTE — OCCUPATIONAL THERAPY NOTE
Occupational Therapy Treatment Note      Gisela Leary    2/8/2019    Patient Active Problem List   Diagnosis    Aortic stenosis, mild    Essential hypertension    Hyperlipidemia    Left bundle branch block    Increased BMI    Murmur    Osteoarthritis of both knees    Pericardial disease    Sciatica    Age-related cataract of right eye    Venous insufficiency    Bilateral leg edema    [facility]STEMI [facility]non-ST elevated [facility] infarction) [facility]    ESRD (end stage renal disease) ([facility] )    Stress-induced cardiomyopathy    [facility]    History of aortic valve replacement with bioprosthetic valve    Atrial fibrillation (HCC)    Staphylococcus aureus bacteremia    Transaminitis    Thrombocytopenia (HCC)    Anemia    Leukocytosis       Past Medical History:   Diagnosis Date    Allergic rhinitis     last assessed 9/12/12    Finger fracture, right     Closed fx of the middle phalanx of the right 5th finger  last assessed 1/30/14    Hemorrhoids     last assessed 2/10/14    Hyperlipidemia     Hypertension        Past Surgical History:   Procedure Laterality Date    AORTIC VALVE REPLACEMENT  07/30/2014    AVR with 25mm [facility] Ease bovine pericardial valve    CARDIAC CATHETERIZATION  07/18/2014    SLB left main-normal, circumflex-normal, ramus intermedius-normal, RCA was large and dominant giving rise to the PDA & a large posterolateral branch  No disease  last assessed 8/19/14     LUPE LAST HSPTL PLACEMENT  2/4/2019    KNEE CARTILAGE SURGERY Left     medial meniscus tear     TESTICLE SURGERY      TONSILLECTOMY AND ADENOIDECTOMY      TOOTH EXTRACTION        02/08/19 1417   Restrictions/Precautions   Weight Bearing Precautions Per Order No   Other Precautions Cognitive;Aspiration; Fall Risk;Multiple lines;Telemetry;Pain;O2   Lifestyle   Autonomy I ADLS, IADLS, transfers and functional mobility PTA   Reciprocal Relationships Pt lives w/ spouse and mother in law Service to Others Pt works full time as a     Intrinsic Gratification pt likes trains   Pain Assessment   Pain Assessment FLACC   Pain Type Acute pain   Pain Location Shoulder   Pain Orientation Left   Pain Descriptors Aching;Discomfort   Hospital Pain Intervention(s) Repositioned; Emotional support   Pain Rating: FLACC (Rest) - Face 1   Pain Rating: FLACC (Rest) - Legs 0   Pain Rating: FLACC (Rest) - Activity 0   Pain Rating: FLACC (Rest) - Cry 0   Pain Rating: FLACC (Rest) - Consolability 0   Score: FLACC (Rest) 1   Pain Rating: FLACC (Activity) - Face 0   Pain Rating: FLACC (Activity) - Legs 0   Pain Rating: FLACC (Activity) - Activity 0   Pain Rating: FLACC (Activity) - Cry 0   Pain Rating: FLACC (Activity) - Consolability 0   Score: FLACC (Activity) 0   ADL   Eating Assistance 4  Minimal Assistance   Eating Deficit Setup;Supervision/safety; Increased time to complete; Beverage management   Eating Comments Pt required hand over hand assist to bring soda can w/ straw to mouth to sip w/ RUE   Toileting Assistance  1  Total Assistance   Toileting Deficit Perineal hygiene   Toileting Comments pt incontinent of bowel in bed; required total A for perineal hygiene and total Ax 3for rolling L and R    Bed Mobility   Rolling R 1  Dependent   Additional items Assist x 3; Increased time required;Verbal cues;LE management   Rolling L 1  Dependent   Additional items Assist x 3; Increased time required;Verbal cues;LE management   Supine to Sit 1  Dependent   Additional items Assist x 3; Increased time required;Verbal cues;LE management;HOB elevated   Sit to Supine 1  Dependent   Additional items Assist x 3; Increased time required;Verbal cues;LE management;HOB elevated   Additional Comments pt went from supine to sit w/ total Ax3 for LE management, UB support, HOB elevated for assist  Once seated EOB, pt went into V-tach and rapid A-fib  RN, present and aware  Pt returned to supine safely and monitored   Pt rolled L and R in bed for cleansing of perineal hygiene after BM; required total x3 for initiation into rolling and assist in side lying   Transfers   Sit to Stand Unable to assess   Therapeutic Exercise - ROM   UE-ROM Yes   ROM- Right Upper Extremities   R Shoulder AAROM; Flexion   R Elbow AAROM;Elbow flexion;Elbow extension   R Hand AAROM; Thumb; Index finger; Long finger;Ring finger;Little finger   R Position Supine  (seated upright)   R Weight/Reps/Sets x5   RUE ROM Comment pt completed 1 set of 5 bicep curls and shoulder flexion to increased UE strength for functional tasks; pt also completed 10 hand squeezes  pt able to verbally count exercises but required AAROM and cueing for proper technique   ROM - Left Upper Extremities    L Shoulder AAROM; Flexion   L Elbow AAROM;Elbow flexion;Elbow extension   L Hand AAROM; Index finger; Thumb; Long finger;Ring finger;Little finger   L Position Seated  (HOB elevated)   L Weight/Reps/Sets x5 each   LUE ROM Comment pt performed 1 set of 5 bicep curls and shoulder flexion (AAROM w/ increased time; pt able to count exercises out loud); pt performed 10 hand squeezes; L UE weaker than RUE   Cognition   Overall Cognitive Status Impaired   Arousal/Participation Responsive; Cooperative   Attention Attends with cues to redirect   Orientation Level Oriented to person;Oriented to time; Other (Comment)  (oriented to general place; reported [facility])   Memory Within functional limits   Following Commands Follows one step commands without difficulty   Comments pt is pleasant and cooperative; has decreased understanding of deficits; requires occasional re-direction to task   Activity Tolerance   Activity Tolerance Patient limited by fatigue;Treatment limited secondary to medical complications (Comment)  (a-fib)   Medical Staff Made Aware PT, Anitha Lee and RN, Mitra   Assessment   Assessment Patient participated in Skilled OT session 2/8/19 with interventions consisting of ADL re training with the use of correct body mechnaics, Energy Conservation techniques, deep breathing technique, safety awareness and fall prevention techniques, therapeutic exercise to: increase functional use of BUEs, increase BUE muscle strength ,  therapeutic activities to: increase activity tolerance, increase dynamic sit/ stand balance during functional activity , increase postural control, increase trunk control and increase OOB/ sitting tolerance   Patient agreeable to OT treatment session, upon arrival patient was found supine in bed  In comparison to previous session, patient with improvements in bed mobility, activity tolerance, increased alertness/arousal and UE exercise  Patient requiring verbal cues for safety, verbal cues for correct technique, verbal cues for pacing thru activity steps, cognitive assistance to anticipate next step and frequent rest periods  Patient continues to be functioning below baseline level, occupational performance remains limited secondary to factors listed above and increased risk for falls and injury  From OT standpoint, recommendation at time of d/c would be Short Term Rehab when medically stable  Patient to benefit from continued Occupational Therapy treatment while in the hospital to address deficits as defined above and maximize level of functional independence with ADLs and functional mobility  Plan   Treatment Interventions ADL retraining;Functional transfer training;UE strengthening/ROM; Endurance training;Cognitive reorientation;Patient/family training;Equipment evaluation/education; Fine motor coordination activities; Compensatory technique education;Continued evaluation; Energy conservation; Activityengagement   Goal Expiration Date 02/21/19   Treatment Day 1   OT Frequency 3-5x/wk   Recommendation   OT Discharge Recommendation Short Term Rehab   OT - OK to Discharge Yes  (when medically stable)   Barthel Index   Feeding 5   Bathing 0   Grooming Score 0   Dressing Score 0   Bladder Score 0   Bowels Score 0   Toilet Use Score 0   Transfers (Bed/Chair) Score 0   Mobility (Level Surface) Score 0   Stairs Score 0   Barthel Index Score 5   Modified [place] Scale   Modified [place] Scale 5       Radha Gomez MS, OTR/L

## 2019-02-08 NOTE — UTILIZATION REVIEW
Continued Stay Review    Date: 2/8/19    Vital Signs: /74 (BP Location: Right arm)   Pulse 104   Temp 97 7 °F (36 5 °C) (Oral)   Resp (!) 27   Ht 5' 9" (1 753 m)   Wt (!) 172 kg (379 lb 3 1 oz)   SpO2 96%   BMI 56 00 kg/m² '    Assessment/Plan: 59 YO MALE W/ PMH  HTN, BIOPROSTHETIC VALVE ADMITTED WITH SHOCK OF MULTIFACTORIAL ETIOLOGY WITH PATRICK AND NEWLY DIAGNOSED A FIB W/ RVR  PT IS CURRENTLY AWAKE AND OFF THE VENTILATOR AS OF 2/6  HE IS OFF PRESSORS AND BEING TREATED WITH MIDODRINE 5 MG 3X DAILY  BNP IS ELEVATED  ECG IS SHOWING A FLUTTER WITH HER BETWEEN   RECEIVED AN ADDITIONAL DOSE OF METROPOLOL 5 MG OVERNIGHT AND DAILY DOSE INCREASED TO 25 MG BID  ON HEPARIN DRIP AND LOOKING TO CONVERT TO COUMADIN SOON  CARDIOLOGY WANTS TO REPEAT CHON WHEN PT IS MORE STABLE  RECEIVING HEMODIALYSIS  WBC SLOWING IMPROVING, BLOOD CULTURES NEGATIVE SO FAR  BLOOD SUGARS ARE WELL CONTROLLED  PT HAS A DTI TO L GREAT TOE AND WOUND CARE IS FOLLOWING  WILL NEED ACUTE IP REHAB POST DISCHARGE        Medications:   Scheduled Meds:   Current Facility-Administered Medications:  acetaminophen 650 mg Oral Q6H PRN    albuterol 2 5 mg Nebulization Q4H PRN    amiodarone 200 mg Oral Daily With Breakfast    bisacodyl 10 mg Rectal Daily PRN    calcium acetate 1,334 mg Oral BID    cefazolin 1,000 mg Intravenous Q24H Last Rate: Stopped (02/08/19 0015)   guaiFENesin 600 mg Oral Q12H [address]    heparin (porcine) 3-30 Units/kg/hr (Order-Specific) Intravenous Titrated Last Rate: 25 1 Units/kg/hr (02/08/19 1513)   HYDROmorphone 1 mg Intravenous Q3H PRN    ipratropium 0 5 mg Nebulization Q6H    levalbuterol 1 25 mg Nebulization Q6H    metoprolol 5 mg Intravenous Q6H PRN    metoprolol tartrate 50 mg Oral Q12H ARACELIS    nystatin  Topical BID    oxyCODONE 10 mg Oral Q4H PRN    polyethylene glycol 17 g Oral Daily    polyvinyl alcohol 1 drop Both Eyes Q3H PRN    QUEtiapine 25 mg Oral HS    senna 1 tablet Oral HS    warfarin 5 mg Oral Once (warfarin)      Continuous Infusions:     AMIODARONE DRIP   STOPPED 2/8 @ 0844     heparin (porcine) 3-30 Units/kg/hr (Order-Specific) Last Rate: 25 1 Units/kg/hr (02/08/19 1513)     PRN Meds:   acetaminophen    albuterol    bisacodyl    HYDROmorphone    metoprolol    oxyCODONE    polyvinyl alcohol     Pertinent Labs/Diagnostic Results:     CL 94  BUN/CR 87/5 84  KIM 8 1  MAG 2 8  WBC 13 04 TRENDING UPWARD  H/H 7 8/25 8  PT  INR 16 7/1 34  , 81    Age/Sex: 60 y o  male     Discharge Plan: [address] Utilization Review Department  Phone: [phone]; Fax [phone]  [email or web address]  ATTENTION: Please call with any questions or concerns to [phone]  and carefully listen to the prompts so that you are directed to the right person  Send all requests for admission clinical reviews, approved or denied determinations and any other requests to fax [phone]   All voicemails are confidential

## 2019-02-08 NOTE — PHYSICAL THERAPY NOTE
Physical Therapy Progress Note     02/08/19 1419   Pain Assessment   Pain Assessment No/denies pain   Restrictions/Precautions   Weight Bearing Precautions Per Order No   Other Precautions Cognitive;Multiple lines;Telemetry;O2;Fall Risk   General   Chart Reviewed Yes   Family/Caregiver Present No   Cognition   Overall Cognitive Status Impaired   Orientation Level Oriented to person   Subjective   Subjective happy that he did better today, realizes that it will take a long time to recover   Bed Mobility   Rolling R 1  Dependent   Additional items Assist x 3; Increased time required;Verbal cues;LE management   Rolling L 1  Dependent   Additional items Assist x 3; Increased time required;Verbal cues;LE management   Supine to Sit 1  Dependent   Additional items Assist x 3; Increased time required;Verbal cues;LE management   Sit to Supine 1  Dependent   Additional items Assist x 3; Increased time required;Verbal cues;LE management   Transfers   Sit to Stand Unable to assess   Balance   Static Sitting Poor -   Endurance Deficit   Endurance Deficit Yes   Endurance Deficit Description tachycardia  rapid a fib, HR > 145   Activity Tolerance   Activity Tolerance Treatment limited secondary to medical complications (Comment)   Nurse Made Aware yes- jaqueline   Exercises   TKR Supine;5 reps;AAROM; Bilateral  (slight improvement in strength)   Balance training  sitting edge of bed, less than 1 minute due to rapid escalation in HR, appearing like v tach, nursing present, returned to supine  no distress noted by pt   Assessment   Prognosis Guarded   Problem List Decreased strength;Decreased range of motion;Decreased endurance; Impaired balance;Decreased mobility; Decreased cognition; Impaired judgement;Decreased safety awareness; Obesity; Decreased skin integrity   Assessment pt now on NC O2 at 6L  pt breathing comfortably for duration of PT session   pt continues to be dependent for all mobility, assist of 3  pt sat on edge of bed for < 30 seconds  pt developed high heart rate, returned to supine  pt worked on bed mobiliyt, rolling to repositioning and cleaning  then able to perform ther ex ble, continueing to need maximal assist but demonstrating improved strength  pt will need continued PT, rehab , will likely need slower paced program due to heart and lung compromises  Barriers to Discharge (medical status)   Goals   Patient Goals get strength back   STG Expiration Date 02/19/19   Treatment Day 2   Plan   Treatment/Interventions Functional transfer training;LE strengthening/ROM; Therapeutic exercise; Endurance training;Cognitive reorientation;Patient/family training;Equipment eval/education; Bed mobility;Spoke to nursing   Progress Slow progress, medical status limitations   Recommendation   Recommendation Post acute IP rehab   PT - OK to Discharge Yes  (when medically stable)   Additional Comments rehab     Fernanda Yee, PT

## 2019-02-08 NOTE — PROGRESS NOTES
Cardiology Progress Note - Pradip Barnes 60 y o  male MRN: [id number]    Unit/Bed#: [facility] Encounter: [id number]      Assessment:  Principal Problem:    Cardiogenic shock ([facility] )  Active Problems:    Increased BMI    NSTEMI (non-ST elevated [facility] infarction) ([facility] )    PATRICK (acute kidney injury) ([facility] )    Stress-induced cardiomyopathy    Acute respiratory failure with hypoxia ([place] [address] )    History of aortic valve replacement with bioprosthetic valve    Atrial fibrillation (HCC)    Septic shock (HCC)    Staphylococcus aureus bacteremia    Transaminitis    Thrombocytopenia (HCC)    Anemia    Leukocytosis        Plan:  1  HFrEF- LVEF-35-40%, LVIDd-6 5 cm- Cardiomyopathy in the setting of Septic shock with possible component of cardiogenic shock- Biventricular failure  - Clinically warm with dependent edema  - Stable off Milrinone  - I/O-2753/2500 with net positive 253, recommend volume removal with dialysis  - Echo 1/25- Upper normal LV size with LVEF-30%, Concentric hypertrophy, Dilated RV with reduced function, Dyssynergic septum  Bioprosthetic aortic valve- not very well visualized- mean gradient of 13  - His cardiomyopathy is predominantly sepsis mediated myocardial dysfunction    2  New onset Atrial fibrillation in the setting of sepsis  - Change IV to oral amiodarone 200mg daily  - Increase metoprolol tartrate to 50mg twice daily  - anticoagulation with IV heparin- continue for now, can consider transition to Apixaban (need to check cost with insurance) vs coumadin     3  MSSSA bacteremia with possible source being pneumonia  - On Cefazolin   - Sputum with staph aureus, 1/27- blood cultures- no growth  - CHON with no evidence of endocarditis       4  Bioprosthetic AVR for severe degenerative AS- 25 mm Champagne Magna in July 2014  - Gradients across the valve in the past have been around 21, current gradients are 13- CHON- no endocarditis     5   Troponin elevation  - Troponins elevated to above 40, this is in the setting of septic shock  - EKG with no ischemic changes  - Normal cath in 2014  May need outpatient stress test      6  PATRICK- in the setting of septic shock  - on  HD    7  Microcytic anemia         Subjective:   Patient seen and examined  Awake, follows commands  Appears tired  No chest pain or pressure, no shortness of breath at rest, no palpitations    Telemetry- Atrial fibrillation/flutter with controlled ventricular rates in the 70-80 since yesterday afternoon    Objective:     Vitals: Blood pressure 133/74, pulse 70, temperature 98 9 °F (37 2 °C), temperature source Axillary, resp  rate 22, height 5' 9" (1 753 m), weight (!) 172 kg (379 lb 3 1 oz), SpO2 99 %  , Body mass index is 56 kg/m² ,   Orthostatic Blood Pressures      Most Recent Value   Blood Pressure  133/74 filed at 02/08/2019 0600   Patient Position - Orthostatic VS  Lying filed at 02/07/2019 0431            Intake/Output Summary (Last 24 hours) at 02/08/19 0834  Last data filed at 02/08/19 0656   Gross per 24 hour   Intake          2698 49 ml   Output             2500 ml   Net           198 49 ml         Physical Exam:    GEN: Deepak Reeves on nasal cannula, follows commands, awake and alert  HEENT: anicteric, mucous membranes moist  NECK: no jvd, no carotid bruits, right and left central lines  HEART: Irregular rhythm, normal S1 and S2,  no murmurs, clicks, gallops or rubs   LUNGS: rhonchi in the anterior lung fields, diminished breath sounds in the left lung  ABDOMEN: normal bowel sounds, soft, no tenderness, no distention  EXTREMITIES: peripheral pulses 1+; bluish discoloration of the toes, warm feet, dependent edema of bilateral upper and lower extremities  NEURO: no focal findings   SKIN: coin shaped excoriations of the upper back      Current Facility-Administered Medications:     acetaminophen (TYLENOL) tablet 650 mg, 650 mg, Oral, Q6H PRN, Teresita Crawford MD, 650 mg at 02/07/19 1149    albuterol inhalation solution 2 5 mg, 2 5 mg, Nebulization, Q4H PRN, Lisa Santos PA-C    amiodarone (CORDARONE) 900 mg in dextrose 5 % 500 mL infusion, 1 mg/min, Intravenous, Continuous, Matilda Figueroa DO, Last Rate: 33 3 mL/hr at 02/08/19 0320, 1 mg/min at 02/08/19 0320    bisacodyl (DULCOLAX) rectal suppository 10 mg, 10 mg, Rectal, Daily PRN, Lisa Santos PA-C    calcium acetate (PHOSLO) capsule 1,334 mg, 1,334 mg, Oral, BID, Amy Iqbal MD, 1,334 mg at 02/07/19 1749    ceFAZolin (ANCEF) 1 g in sodium chloride 0 9% 50 ml IVPB, 1,000 mg, Intravenous, Q24H, Amanda Frazier MD, Stopped at 02/08/19 0015    guaiFENesin (MUCINEX) 12 hr tablet 600 mg, 600 mg, Oral, Q12H [address], Hi HERBERT Vega, 600 mg at 02/07/19 2042    heparin (porcine) 25,000 units in 250 mL infusion (premix), 3-30 Units/kg/hr (Order-Specific), Intravenous, Titrated, Luigi Mir PA-C, Last Rate: 22 6 mL/hr at 02/08/19 0656, 25 1 Units/kg/hr at 02/08/19 0656    HYDROmorphone (DILAUDID) injection 1 mg, 1 mg, Intravenous, Q3H PRN, Lidya Martinez, 1 mg at 02/04/19 1819    insulin lispro (HumaLOG) 100 units/mL subcutaneous injection 5-25 Units, 5-25 Units, Subcutaneous, Q6H [address], 5 Units at 02/05/19 1744 **AND** Fingerstick Glucose (POCT), , , Q6H, Lisa Santos PA-C    ipratropium (ATROVENT) 0 02 % inhalation solution 0 5 mg, 0 5 mg, Nebulization, Q6H, Lorrie Hernandez PA-C, 0 5 mg at 02/08/19 0719    levalbuterol (XOPENEX) inhalation solution 1 25 mg, 1 25 mg, Nebulization, Q6H, Lorrie Hernandez PA-C, 1 25 mg at 02/08/19 0719    metoprolol (LOPRESSOR) injection 5 mg, 5 mg, Intravenous, Q6H PRN, Oleg Mcleod, DO, 5 mg at 02/07/19 2204    metoprolol tartrate (LOPRESSOR) tablet 25 mg, 25 mg, Oral, Q12H [address], Olive Umanzor PA-C, 25 mg at 02/07/19 2043    midodrine (PROAMATINE) tablet 5 mg, 5 mg, Oral, TID AC, KATHY South, 5 mg at 02/07/19 1521    nystatin (MYCOSTATIN) powder, , Topical, BID, Lisa Santos PA-C    oxyCODONE (ROXICODONE) immediate release tablet 10 mg, 10 mg, Oral, Q4H PRN, Tarik Ruffin MD, 10 mg at 02/07/19 1431    polyethylene glycol (MIRALAX) packet 17 g, 17 g, Oral, Daily, KATHY Mathias, 17 g at 02/06/19 2867    polyvinyl alcohol (LIQUIFILM TEARS) 1 4 % ophthalmic solution 1 drop, 1 drop, Both Eyes, Q3H PRN, Adenikeailyn Hernandez PA-C, 1 drop at 02/07/19 0813    predniSONE tablet 40 mg, 40 mg, Oral, Daily, [employer], PA-C, 40 mg at 02/07/19 0809    QUEtiapine (SEROquel) tablet 25 mg, 25 mg, Oral, HS, Adenike Clubs, PA-C, 25 mg at 02/07/19 2203    senna (SENOKOT) tablet 8 6 mg, 1 tablet, Oral, HS, Tarik Ruffin MD    Labs & Results:    Lab Results   Component Value Date    CKTOTAL 463 (H) 02/01/2019    CKTOTAL 3,180 (H) 01/27/2019    CKTOTAL 10,421 (H) 01/26/2019    CKMB 3 9 02/01/2019    CKMB 14 9 (H) 01/27/2019    CKMB 38 7 (H) 01/26/2019    CKMBINDEX <1 0 02/01/2019    CKMBINDEX <1 0 01/27/2019    CKMBINDEX <1 0 01/26/2019    TROPONINI 36 20 (H) 01/24/2019    TROPONINI 36 30 (H) 01/24/2019    TROPONINI 34 90 (H) 01/24/2019       Lab Results   Component Value Date    GLUCOSE 92 04/09/2015    CALCIUM 8 1 (L) 02/08/2019     04/09/2015    K 4 6 02/08/2019    CO2 23 02/08/2019    CL 94 (L) 02/08/2019    BUN 87 (H) 02/08/2019    CREATININE 5 84 (H) 02/08/2019       Lab Results   Component Value Date    WBC 13 04 (H) 02/08/2019    HGB 7 8 (L) 02/08/2019    HCT 25 8 (L) 02/08/2019    MCV 88 02/08/2019     02/08/2019           Lab Results   Component Value Date    CHOL 146 07/18/2014    CHOL 145 02/21/2014     Lab Results   Component Value Date    HDL 41 06/02/2018    HDL 52 06/27/2017     Lab Results   Component Value Date    LDLCALC 57 06/02/2018    LDLCALC 129 (H) 06/27/2017     Lab Results   Component Value Date    TRIG 102 06/02/2018    TRIG 71 06/27/2017       Lab Results   Component Value Date    ALT 19 02/01/2019    AST 80 (H) 02/01/2019

## 2019-02-08 NOTE — PLAN OF CARE
Problem: PHYSICAL THERAPY ADULT  Goal: Performs mobility at highest level of function for planned discharge setting  See evaluation for individualized goals  Treatment/Interventions: LE strengthening/ROM, Therapeutic exercise, Endurance training, Patient/family training, Cognitive reorientation, Equipment eval/education, Bed mobility, Spoke to advanced practitioner, Spoke to case management, Spoke to nursing  Equipment Recommended:  (TBD- may benefit from [facility] bed)       See flowsheet documentation for full assessment, interventions and recommendations  Outcome: Progressing  Prognosis: Guarded  Problem List: Decreased strength, Decreased range of motion, Decreased endurance, Impaired balance, Decreased mobility, Decreased cognition, Impaired judgement, Decreased safety awareness, Obesity, Decreased skin integrity  Assessment: pt now on NC O2 at 6L  pt breathing comfortably for duration of PT session  pt continues to be dependent for all mobility, assist of 3  pt sat on edge of bed for < 30 seconds  pt developed high heart rate, returned to supine  pt worked on bed mobiliyt, rolling to repositioning and cleaning  then able to perform ther ex ble, continueing to need maximal assist but demonstrating improved strength  pt will need continued PT, rehab , will likely need slower paced program due to heart and lung compromises  Barriers to Discharge:  (medical status)     Recommendation: Post acute IP rehab     PT - OK to Discharge: Yes (when medically stable)    See flowsheet documentation for full assessment

## 2019-02-08 NOTE — PROGRESS NOTES
Progress Note - Infectious Disease   Wilberto Suggs 60 y o  male MRN: [id number]  Unit/Bed#: MICU 11 Encounter: [id number]      Impression/Plan:  1  Severe sepsis/septic shock   Fever, tachycardia, leukocytosis, hypotension   Also with component of cardiogenic shock  Likely precipitated by MSSA bacteremia   No other clear evidence of acute infection identified   Fevers, procalcitonin improved  Otherwise patient clinically much improved with clearance of bacteremia  Mild leukocytosis noted today      -antibiotic plan as below  -monitor temperatures and hemodynamics  -supportive care as per ICU        2  MSSA bacteremia   Strong possibility of endocarditis in the setting of bioprosthetic AVR   CHON with no evidence of valvular vegetations   Initial portal of entry may have been related to multiple upper back wounds   CT chest/abdomen/pelvis with no other evidence of acute infection   Possible from pneumonia as sputum culture was positive for MSSA   Bacteremia eventually cleared  Podiatry evaluated patient's feet and no acute issues  No new findings on patient's skin exam per nursing notes      -continue with renally dosed IV cefazolin   -will require prolonged course of IV antibiotic of 6 weeks through 3/10  -antibiotics will likely be dosed with hemodialysis at discharge     3  History of bioprosthetic AVR   Secondary to degenerative AS   Placed in July 2014  Melanie Roach no evidence of endocarditis      4  Acute kidney injury   Likely ischemic/septic ATN    Patient remains on hemodialysis   He is status post PermCath placement   -antibiotics dosed for renal dysfunction  -ongoing follow-up by Nephrology  -antibiotics can be dosed with hemodialysis at discharge     5   Acute hypoxic respiratory failure   Likely in the setting of septic shock as well as acute systolic cardiomyopathy/volume overload   Recent CT chest with no evidence to suggest pulmonary infection   Most recent chest x-ray continues to suggest volume overload  Patient appears well on nasal cannula      -monitor secretions  -monitor O2 requirements  -ongoing supportive care as per ICU      ID consult service will formally re-evaluate the patient again on Monday  Please contact on-call attending if any additional questions over the weekend  Antibiotics:  Ancef    24 hour events:  No acute events noted overnight on chart review  Patient currently afebrile  White blood cell count [id number]  No new culture data  Chest x-ray noted this morning  Patient's other vitals appear stable    Subjective:  Patient currently on nasal cannula and appears comfortable  He is also receiving hemodialysis at this time  He reports feeling tired  He denies having any nausea, vomiting, chest pain or shortness of breath  He is reporting feeling constipated  Reviewed with nursing and patient has no significant breakdown on his back or other new wounds  Objective:  Vitals:  Temp:  [98 9 °F (37 2 °C)-99 1 °F (37 3 °C)] 98 9 °F (37 2 °C)  HR:  [] 70  Resp:  [21-40] 22  BP: (100-146)/() 133/74  SpO2:  [93 %-100 %] 99 %  Temp (24hrs), Av °F (37 2 °C), Min:98 9 °F (37 2 °C), Max:99 1 °F (37 3 °C)  Current: Temperature: 98 9 °F (37 2 °C)    Physical Exam:   General Appearance: Will intermittently falls asleep on exam, nontoxic, chronically ill-appearing, obese   Throat: Oropharynx moist without lesions  Lungs:   Decreased breath sounds anteriorly; no wheezes, rhonchi or rales; respirations unlabored on nasal cannula   Heart:  RRR; no murmur, rub or gallop though distant heart sounds   Abdomen:   Soft, non-tender, non-distended, minimal bowel sounds  Obese abdomen    Extremities: No clubbing, cyanosis; 2 to 3+ pitting edema in the upper and lower extremities bilaterally  Skin: No new rashes or lesions on exposed skin  No new draining wounds noted on exposed skin  Per nursing notes the patient has excoriations in the folds of his groin and under his abdomen  Previous site of central line on the patient's right neck has no erythema but the skin is tough         Labs, Imaging, & Other studies:   All pertinent labs and imaging studies were personally reviewed    Results from last 7 days  Lab Units 02/08/19  0525 02/07/19  0455 02/06/19  0458   WBC Thousand/uL 13 04* 9 37 11 06*   HEMOGLOBIN g/dL 7 8* 7 6* 7 8*   PLATELETS Thousands/uL 260 191 172       Results from last 7 days  Lab Units 02/08/19  0525   POTASSIUM mmol/L 4 6   CHLORIDE mmol/L 94*   CO2 mmol/L 23   BUN mg/dL 87*   CREATININE mg/dL 5 84*   EGFR ml/min/1 73sq m 10   CALCIUM mg/dL 8 1*

## 2019-02-08 NOTE — PROGRESS NOTES
Progress Note - Critical Care   Kira Borja 60 y o  male MRN: [id number]  [facility]: [id number]    Attending Physician: Ariadne Burden, DO      ______________________________________________________________________  Assessment and Plan:   Principal Problem:    Cardiogenic shock ([facility] )  Active Problems:    Increased BMI    NSTEMI (non-ST elevated myocardial infarction) ([facility] )    PATRICK (acute kidney injury) ([facility] )    Stress-induced cardiomyopathy    Acute respiratory failure with hypoxia ([facility] )    History of aortic valve replacement with bioprosthetic valve    Atrial fibrillation ([facility] )    Septic shock (HCC)    Staphylococcus aureus bacteremia    Transaminitis    Thrombocytopenia (HCC)    Anemia    Leukocytosis  Resolved Problems:    Acute encephalopathy    Rhabdomyolysis    60year-old, PMHx of  HTN, bioprosthetic valve admitted with shock of multifactorial etiology with PATRICK and newly diagnosed AFib with RVR     Neuro:   · Patient is awake,alert and interractive   · Sedation & Analgesia: Off Sedation  · Seroquel 25 mg PO QHS  · Neuro checks as per unit protocol  · Take precautions to prevent ICU delirium   Monitor GCS     Peripheral Neuropathy  · Gabapentin 100 daily dc'd 02/06, consider restarting if symtomatic     Conjunctivitis  · Artificial tears prn     CV:   · Shock, likely of multifactorial etiology,off pressors  · BNP elevated  · Midodrine 5 mg 3x daily  · Continue to monitor with MAP goal >65      New onset A fib with RVR   · A flutter with HR between 70 to 132 today, was given additional 5 mg metoprolol overnight  · Per cardiology, pt was started on  amio drip s/p 12 5 mg total pushes of Lopressor and 25 mg PO given during the day yesterday and required additional 250 mcg of Digoxin and 5 mg metoprolol  ·  Metoprolol increased to 25 mg bid , per HF team  · On Heparin drip for [facility]  · Per Cardiology, consider a repeat TTE when clinically improved  · May transition to coumadin per Cards, if stable  · Continue tele monitoring  ·  Echo 1/25: EF 30% with moderate diffuse hypokinesis, no evidence of vegetations        History of Bioprosthetic AoVR 2014     NSTEMI type 2 2/2 shock  ·  Continuous telemetry      Pulm:   · Acute hypoxic respiratory failure  · Extubated 02/06  · Did well on Bipap overnight, will monitor on oxygen via NC today and consider Bipap at night  · SPO2 goal > 92%  · Mild wheezing   · Prednisone 40 mg daily( 5/5) today  · Albuterol nebs Q 6  · Mucinex  · Monitor secretions  · CXR 02/08: shows some improvement, pending final read        GI:   · Shock liver vs hepatic congestion  · Extubated 02/06,on a cardiac diet  · Bowel regimen: Miralax, senna, Dulcolax         :   PATRICK likely 2/2 ischemia vs Septic ATN vs Rhabdo  · CRRT dc'd transitioned to IHD 02/01  · HD 02/07 with 2 5 L removed, Had HD today  · UOP -anuric   · Monitor I&Os  · Cr 5 84 from 4 43 (BL 0 7-0 8),  · CK improved markedly, will stop trending  · PermaCath placed by IR R subclavian and LIJ exchange (02/04)  · Nephrology following and recommend HD M/W/F        F/E/N:  1  Fluids/Electrolytes  · Will monitor  Electrolytes and Replete as needed  · HYperphosphatemia, started PhosLo by Nephro     2   Nutrition: On cardiac diet         ID:   · MSSA bacteremia , likely 2/2 MSSA PNA in the setting of positive Sputum Culture  · WBC stable gradually improving  · Bcx 1/27 negative so far  · CHON negative for vegetation, EF  30%  · Day # 14 of Ancef, renally dosed ,will treat for at least 6 weeks, per ID  · Trend fever curve and WBC  ·  Per ID, may consider spine imaging         Heme:   · Thrombocytopenia likely consumptive, improved  · Hbg- stable at 7 8, likely dilutional vs ACD from renal dysfunction, will continue to trend CBC  · tranfuse as needed with goal >7  · DVT ppx:  On heparin drip for stroke ppx, PTT- 110,  · Continue SCDs     Endo:   · Blood sugars well controlled,  over the last 24 hrs  · Goal of 140-180  · Will continue to monitor  · SSI     · Msk/Skin:  · Painful left great toe with DTI,  Podiatry re- consulted recommend monitoring  · Turn/position 2 hourly  · Wound care   · PT/OT following, recommends Post acute IP rehab            Disposition: Continued ICU stay     Code Status: Level 1 - Full Code    Counseling / Coordination of Care  Total Critical Care time spent 30 minutes excluding procedures, teaching and family updates  ______________________________________________________________________    Chief Complaint: Pt reports he is "feeling ok"    24 Hour Events:   No acute events overnight  Pt required additional 5 mg Iv dose of metoprolol 2/2 elevated HR    ______________________________________________________________________    Physical Exam:   Physical Exam   HENT:   Head: Normocephalic and atraumatic  Cardiovascular:   Irregularly irregular   Pulmonary/Chest: He has wheezes  Abdominal: There is no tenderness  Obese abdomen, tensed on palpation   Musculoskeletal: He exhibits edema  Neurological: He is alert  On Bipap   Skin: Skin is warm and dry  Medial aspect of left great toe ecchymosis    Peripheral pulse 2+   Vitals reviewed  ______________________________________________________________________  Vitals:    19 0400 19 0404 19 0500 19 0600   BP: 121/78  134/71 133/74   Pulse: 72  80 70   Resp: 21  (!) 24 22   Temp: 98 9 °F (37 2 °C)      TempSrc: Axillary      SpO2: 98% 98% 98% 99%   Weight:       Height:           Temperature:   Temp (24hrs), Av 9 °F (37 2 °C), Min:98 4 °F (36 9 °C), Max:99 1 °F (37 3 °C)    Current Temperature: 98 9 °F (37 2 °C)  Weights:   IBW: 70 7 kg    Body mass index is 56 kg/m²    Weight (last 2 days)     Date/Time   Weight    19 0541  (!)  172 (379 19)    19 0444  (!)  167 (367 51)            Hemodynamic Monitoring:  N/A     Non-Invasive/Invasive Ventilation Settings:  Respiratory    Lab Data (Last 4 hours)    None O2/Vent Data (Last 4 hours)      02/08 0404          Non-Invasive Ventilation Mode BiPAP                 No results found for: PHART, LUN1VIA, PO2ART, UNY6WEH, P0ONNGSH, BEART, SOURCE    Intake and Outputs:  I/O       02/06 0701 - 02/07 0700 02/07 0701 - 02/08 0700 02/08 0701 - 02/09 0700    P  O   770     I V  (mL/kg) 1410 7 (8 2) 1782 6 (10 4)     IV Piggyback  200     Feedings 57      Total Intake(mL/kg) 1467 7 (8 5) 2752 6 (16)     Other 2000 2500     Total Output 2000 2500      Net -532 3 +252 6             Unmeasured Stool Occurrence 1 x 2 x           Nutrition:        Diet Orders            Start     Ordered    02/07/19 0814  Diet Regular; Regular House; Cardiac  Diet effective now     Question Answer Comment   Diet Type Regular    Regular Regular House    Other Restriction(s): Cardiac    RD to adjust diet per protocol?  Yes        02/07/19 0814          Labs:     Results from last 7 days  Lab Units 02/08/19 0525 02/07/19 0455 02/06/19 0458 02/05/19  0541   WBC Thousand/uL 13 04* 9 37 11 06* 10 20*   HEMOGLOBIN g/dL 7 8* 7 6* 7 8* 7 7*   HEMATOCRIT % 25 8* 24 8* 24 6* 25 0*   PLATELETS Thousands/uL 260 191 172 145*   MONO PCT %  --  10 8 3*       Results from last 7 days  Lab Units 02/08/19 0525 02/07/19 0455 02/06/19  0458   POTASSIUM mmol/L 4 6 4 4 4 8   CHLORIDE mmol/L 94* 99* 98*   CO2 mmol/L 23 26 23   BUN mg/dL 87* 62* 79*   CREATININE mg/dL 5 84* 4 43* 5 33*   CALCIUM mg/dL 8 1* 7 7* 8 0*       Results from last 7 days  Lab Units 02/08/19 0525 02/07/19 0455 02/06/19  0458   MAGNESIUM mg/dL 2 8* 2 5 2 5     Lab Results   Component Value Date    PHOS 7 3 (H) 02/07/2019    PHOS 8 1 (H) 02/06/2019    PHOS 6 9 (H) 02/05/2019        Results from last 7 days  Lab Units 02/08/19 0525 02/07/19  0455 02/06/19  0458   PTT seconds 110* 87* 78*       0  Lab Value Date/Time   TROPONINI 36 20 (H) 01/24/2019 2137   TROPONINI 36 30 (H) 01/24/2019 1831   TROPONINI 34 90 (H) 01/24/2019 1613   TROPONINI >40 00 (HH) 01/24/2019 0502   TROPONINI >40 00 (HH) 01/24/2019 0207   TROPONINI 36 61 (HH) 01/23/2019 2251   TROPONINI 24 29 (HH) 01/23/2019 1850   TROPONINI 14 20 (HH) 01/23/2019 1600         ABG:  Lab Results   Component Value Date    PHART 7 477 (H) 01/26/2019    NUJ9NUT 29 6 (LL) 01/26/2019    PO2ART 71 7 (L) 01/26/2019    RZJ4IOF 21 4 (L) 01/26/2019    BEART -1 1 01/26/2019    SOURCE Line, Arterial 01/26/2019     Imaging: CXR    Micro:  Lab Results   Component Value Date    BLOODCX No Growth After 5 Days  01/27/2019    BLOODCX No Growth After 5 Days  01/27/2019    BLOODCX Staphylococcus aureus (A) 01/26/2019    BLOODCX Staphylococcus aureus (A) 01/26/2019    URINECX 2872-1094 cfu/ml Gram Positive Cocci (A) 01/23/2019    SPUTUMCULTUR 1+ Growth of Staphylococcus aureus (A) 01/24/2019     Allergies:    Allergies   Allergen Reactions    Penicillins Rash     However, has subsequently tolerated Cefazolin and Cefepime     Medications:   Scheduled Meds:  Current Facility-Administered Medications:  acetaminophen 650 mg Oral Q6H PRN Tarik Ruffin MD    albuterol 2 5 mg Nebulization Q4H PRN Juvencio Teran PA-C    amiodarone 1 mg/min Intravenous Continuous Macey East, DO Last Rate: 1 mg/min (02/08/19 0320)   bisacodyl 10 mg Rectal Daily PRN Juvencio Teran PA-C    calcium acetate 1,334 mg Oral BID Niko Amaro MD    cefazolin 1,000 mg Intravenous Q24H Tarik Ruffin MD Last Rate: Stopped (02/08/19 0015)   guaiFENesin 600 mg Oral Q12H [address] Olive Umanzor PA-C    heparin (porcine) 3-30 Units/kg/hr (Order-Specific) Intravenous Titrated Mervat Shaw PA-C Last Rate: 25 1 Units/kg/hr (02/08/19 0656)   HYDROmorphone 1 mg Intravenous Q3H PRN Lidya Martinez    insulin lispro 5-25 Units Subcutaneous Q6H [address] Juvencio Teran, PA-ANA    ipratropium 0 5 mg Nebulization Q6H [employer], PA-C    levalbuterol 1 25 mg Nebulization Q6H [employer], PA-C    metoprolol 5 mg Intravenous Q6H PRN Kendrick Jamil DO metoprolol tartrate 25 mg Oral Q12H [address] Olive Umanzor PA-C    midodrine 5 mg Oral TID AC KATHY Walsh    nystatin  Topical BID HERBERT Estes    oxyCODONE 10 mg Oral Q4H PRN Javier Smith MD    polyethylene glycol 17 g Oral Daily KATHY Mathias    polyvinyl alcohol 1 drop Both Eyes Q3H PRN Jeanne Romo PA-C    predniSONE 40 mg Oral Daily [facility] PA-C    QUEtiapine 25 mg Oral HS Jeanne Romo PA-C    senna 1 tablet Oral HS Javier Smith MD      Continuous Infusions:  amiodarone 1 mg/min Last Rate: 1 mg/min (02/08/19 0320)   heparin (porcine) 3-30 Units/kg/hr (Order-Specific) Last Rate: 25 1 Units/kg/hr (02/08/19 0656)     PRN Meds:    acetaminophen 650 mg Q6H PRN   albuterol 2 5 mg Q4H PRN   bisacodyl 10 mg Daily PRN   HYDROmorphone 1 mg Q3H PRN   metoprolol 5 mg Q6H PRN   oxyCODONE 10 mg Q4H PRN   polyvinyl alcohol 1 drop Q3H PRN     VTE Pharmacologic Prophylaxis: Heparin Drip  VTE Mechanical Prophylaxis: sequential compression device  Invasive lines and devices: Invasive Devices     Central Venous Catheter Line            CVC Central Lines 02/04/19 Triple Left Internal jugular 3 days          Hemodialysis Catheter            Permanent HD Catheter  3 days                     Portions of the record may have been created with voice recognition software  Occasional wrong word or "sound a like" substitutions may have occurred due to the inherent limitations of voice recognition software  Read the chart carefully and recognize, using context, where substitutions have occurred      Javier Smith MD

## 2019-02-08 NOTE — PLAN OF CARE
INFECTION - ADULT     Absence of fever/infection during neutropenic period Completed        SAFETY,RESTRAINT: NV/NON-SELF DESTRUCTIVE BEHAVIOR     Remains free of harm/injury (restraint for non violent/non self-detsructive behavior) Completed     Returns to optimal restraint-free functioning Completed          CARDIOVASCULAR - ADULT     Absence of cardiac dysrhythmias or at baseline rhythm Not Progressing        GENITOURINARY - ADULT     Maintains or returns to baseline urinary function Not Progressing        SAFETY ADULT     Maintain or return to baseline ADL function Not Progressing     Maintain or return mobility status to optimal level Not Progressing          CARDIOVASCULAR - ADULT     Maintains optimal cardiac output and hemodynamic stability Progressing        DISCHARGE PLANNING     Discharge to home or other facility with appropriate resources Progressing        DISCHARGE PLANNING - CARE MANAGEMENT     Discharge to post-acute care or home with appropriate resources Progressing        GENITOURINARY - ADULT     Absence of urinary retention Progressing        INFECTION - ADULT     Absence or prevention of progression during hospitalization Progressing        Knowledge Deficit     Patient/family/caregiver demonstrates understanding of disease process, treatment plan, medications, and discharge instructions Progressing        METABOLIC, FLUID AND ELECTROLYTES - ADULT     Electrolytes maintained within normal limits Progressing     Fluid balance maintained Progressing        Nutrition/Hydration-ADULT     Nutrient/Hydration intake appropriate for improving, restoring or maintaining nutritional needs Progressing        PAIN - ADULT     Verbalizes/displays adequate comfort level or baseline comfort level Progressing        Potential for Falls     Patient will remain free of falls Progressing        Prexisting or High Potential for Compromised Skin Integrity     Skin integrity is maintained or improved Progressing        SAFETY ADULT     Patient will remain free of falls Progressing

## 2019-02-08 NOTE — PROGRESS NOTES
NEPHROLOGY PROGRESS NOTE    [address] 60 y o  male MRN: [id number]  Unit/Bed#: MICU 11 Encounter: [id number]  Reason for Consult:  Acute renal failure    The patient is in bed extubated able to talk a little bit better today little bit stronger  He becomes tearful and emotional when talking to him  He is in no distress  ASSESSMENT/PLAN:  1  Renal  The patient has acute renal failure due to ATN from shock and cardiogenic factors  Still remains dialysis dependent  Had extra ultrafiltration yesterday with volume removal and for dialysis today on normal schedule and will outline below  Patient's is relatively stable so monitor hemodynamics closely as remove volume  HEMODIALYSIS PROCEDURE NOTE  The patient was seen and examined on hemodialysis  Time: 4 hours  Sodium: 138 Blood flow: 350   Dialyzer: F160 Potassium: 3 Dialysate flow: 800   Access: catheter Bicarbonate: 35 Ultrafiltration goal: 3        2  Status post cardiogenic shock  Also with some dysrhythmia on amiodarone cardiology following  SUBJECTIVE:  Review of Systems   Constitution: Negative for chills and fever  HENT: Negative  Eyes: Negative  Cardiovascular: Positive for leg swelling and orthopnea  Negative for chest pain  Respiratory: Negative for cough and shortness of breath  Gastrointestinal: Negative  Psychiatric/Behavioral: The patient is nervous/anxious  Tearful  OBJECTIVE:  Current Weight: Weight - Scale: (!) 172 kg (379 lb 3 1 oz)  Vitals:Temp (24hrs), Av °F (37 2 °C), Min:98 9 °F (37 2 °C), Max:99 1 °F (37 3 °C)  Current: Temperature: 98 9 °F (37 2 °C)   Blood pressure 124/72, pulse 96, temperature 98 9 °F (37 2 °C), temperature source Axillary, resp  rate (!) 26, height 5' 9" (1 753 m), weight (!) 172 kg (379 lb 3 1 oz), SpO2 97 %  , Body mass index is 56 kg/m²        Intake/Output Summary (Last 24 hours) at 19 0957  Last data filed at 19 0656   Gross per 24 hour   Intake 2338 49 ml   Output             2500 ml   Net          -161 51 ml       Physical Exam: /72   Pulse 96   Temp 98 9 °F (37 2 °C) (Axillary)   Resp (!) 26   Ht 5' 9" (1 753 m)   Wt (!) 172 kg (379 lb 3 1 oz)   SpO2 97%   BMI 56 00 kg/m²   Physical Exam   Constitutional: No distress  HENT:   Mouth/Throat: No oropharyngeal exudate  Eyes: No scleral icterus  Neck: No JVD present  Cardiovascular: Normal rate  Exam reveals no friction rub  Pulmonary/Chest: Effort normal  No respiratory distress  He has no wheezes  Decreased breath sounds bases  Abdominal: Soft  Bowel sounds are normal  There is no tenderness         Medications:    Current Facility-Administered Medications:     acetaminophen (TYLENOL) tablet 650 mg, 650 mg, Oral, Q6H PRN, Carrington Pompa MD, 650 mg at 02/07/19 1149    albuterol inhalation solution 2 5 mg, 2 5 mg, Nebulization, Q4H PRN, Lianne Perez PA-C    amiodarone tablet 200 mg, 200 mg, Oral, Daily With Breakfast, Gene Dior MD    bisacodyl (DULCOLAX) rectal suppository 10 mg, 10 mg, Rectal, Daily PRN, Lianne Perez PA-C    calcium acetate (PHOSLO) capsule 1,334 mg, 1,334 mg, Oral, BID, Aura Aviles MD, 1,334 mg at 02/08/19 0936    ceFAZolin (ANCEF) 1 g in sodium chloride 0 9% 50 ml IVPB, 1,000 mg, Intravenous, Q24H, Carrington Pompa MD, Stopped at 02/08/19 0015    guaiFENesin (MUCINEX) 12 hr tablet 600 mg, 600 mg, Oral, Q12H [address], Olive Umanzor PA-C, 600 mg at 02/08/19 0936    heparin (porcine) 25,000 units in 250 mL infusion (premix), 3-30 Units/kg/hr (Order-Specific), Intravenous, Titrated, Jaquelin Botello PA-C, Last Rate: 22 6 mL/hr at 02/08/19 0656, 25 1 Units/kg/hr at 02/08/19 0656    HYDROmorphone (DILAUDID) injection 1 mg, 1 mg, Intravenous, Q3H PRN, Lidya Martinez, 1 mg at 02/04/19 1819    ipratropium (ATROVENT) 0 02 % inhalation solution 0 5 mg, 0 5 mg, Nebulization, Q6H, Etelvina Smyth PA-C, 0 5 mg at 02/08/19 0719   levalbuterol (XOPENEX) inhalation solution 1 25 mg, 1 25 mg, Nebulization, Q6H, Birtha Skiff, PA-C, 1 25 mg at 02/08/19 0719    metoprolol (LOPRESSOR) injection 5 mg, 5 mg, Intravenous, Q6H PRN, Ronaldo Ochoa DO, 5 mg at 02/07/19 2204    metoprolol tartrate (LOPRESSOR) tablet 50 mg, 50 mg, Oral, Q12H [facility], Lucille Cobb MD    nystatin (MYCOSTATIN) powder, , Topical, BID, Saba Peterson PA-C    oxyCODONE (ROXICODONE) immediate release tablet 10 mg, 10 mg, Oral, Q4H PRN, Merleen Jeans, MD, 10 mg at 02/07/19 1431    polyethylene glycol (MIRALAX) packet 17 g, 17 g, Oral, Daily, KATHY Mathias, 17 g at 02/08/19 2424    polyvinyl alcohol (LIQUIFILM TEARS) 1 4 % ophthalmic solution 1 drop, 1 drop, Both Eyes, Q3H PRN, Birtha Skiff, PA-C, 1 drop at 02/07/19 0813    QUEtiapine (SEROquel) tablet 25 mg, 25 mg, Oral, HS, Birtha Skiff, PA-C, 25 mg at 02/07/19 2203    senna (SENOKOT) tablet 8 6 mg, 1 tablet, Oral, HS, Merleen Jeans, MD    warfarin (COUMADIN) tablet 5 mg, 5 mg, Oral, Once (warfarin), Radha Hills PA-C    Laboratory Results:  Lab Results   Component Value Date    WBC 13 04 (H) 02/08/2019    HGB 7 8 (L) 02/08/2019    HCT 25 8 (L) 02/08/2019    MCV 88 02/08/2019     02/08/2019     Lab Results   Component Value Date    GLUCOSE 92 04/09/2015    CALCIUM 8 1 (L) 02/08/2019     04/09/2015    K 4 6 02/08/2019    CO2 23 02/08/2019    CL 94 (L) 02/08/2019    BUN 87 (H) 02/08/2019    CREATININE 5 84 (H) 02/08/2019     Lab Results   Component Value Date    CALCIUM 8 1 (L) 02/08/2019    PHOS 7 3 (H) 02/07/2019     No results found for: LABPROT

## 2019-02-08 NOTE — NUTRITION
As per RD Diet Protocol changed diet to Renal Restrictive  Will re-evaluate Diet Rx on follow-up  Adjust Mg and Phosphorus  Monitor renal parameters

## 2019-02-08 NOTE — PROGRESS NOTES
Progress Note - ICU Transfer to SD/MS tele   Pauline Dos Santos 60 y o  male MRN: [id number]  [facility]   Unit/Bed#: MICU 53 Encounter: [id number]    Code Status: Level 1 - Full Code  POA:    POLST:      Reason for ICU admission: cardiogenic shock with PATRICK    Active problems:   Principal Problem (Resolved):    Cardiogenic shock ([facility] )  Active Problems:    Increased BMI    NSTEMI (non-ST elevated myocardial infarction) ([facility] [address] )    ESRD (end stage renal disease) ([facility] [address] )    Stress-induced cardiomyopathy    Acute respiratory failure with hypoxia ([facility] )    History of aortic valve replacement with bioprosthetic valve    Atrial fibrillation (HCC)    Staphylococcus aureus bacteremia    Transaminitis    Thrombocytopenia (HCC)    Anemia    Leukocytosis  Resolved Problems:    Acute encephalopathy    Rhabdomyolysis    Septic shock ([facility] )      Consultants:   Cardiology/ Heart Failure  Neprology  ID  Podiatry    History of Present Illness:  60year old man with obesity, HTN, bioprosthetic AoVR in 2014 presented from [place] [facility] for shock, bacteremia and renal failure  Per chart review, pt was sent home from work with fever and back/neck redness  Had taken APAP and felt improved the following day, however that evening became confused with significant dyspnea  EMS contacted and taken to [facility] ED  Per report SVT given adenosine, likely afib and respiratory distress requiring intubation  Numerous metabolic abnormalities seen  Multiple vasopressors required, low EF on TTE and progressive renal decline prompted transfer, placed on bicarb gtt prior to transfer, with minimal UOP  Summary of clinical course: On admission, patient required pressors, he had MSSA bacteremia with lung as the likely source of infection due to positive MSSA sputum culture  He was found to be in A fib with LBBB and Cardiology was consulted  A CHON was negative  for vegetations with an EF of 30-40%   He required Midodrine to maintain blood pressures that was gradually weaned off  He was started on Amiodarone, Metoprolol and Heparin drip for stroke prophylaxis  He was found to be in A  Flutter and required a one time dose of Digoxin(per Cardiology recs) that improved his HR  He presented with ATN likely from shock and cardiogenic factors  He was on treatment with CVVH, that currently is transitioned to IHD managed by nephrology  He is on the M/W/F schedule  ID is following and recommends Ancef for a total of 6 weeks s/p negative Bcx on 01/27  Podiatry was consulted for deep tissue injury of his R great toe, with no current acute issues  Acute hypoxic respiratory failure was likely in the setting of septic shock as well as acute systolic cardiomyopathy vs volume overload   Most recent chest x-ray suggests improvement  He was extubated on 02/06 and has been doing well on HFNC during the day and Bipap at night  Assessment/Plan    Neuro:   · Seroquel 25 mg PO QHS (started during this admission)       CV:   New onset A fib with RVR   · Metoprolol 50 mg bid  · Amiodarone 200 mg daily  · Coumadin 5 mg daily  · Consider a repeat TTE at some point  · Continue tele monitoring     NSTEMI type 2 2/2 shock      Pulm:   · Acute hypoxic respiratory failure  · Pt will require Bipap at night   · Albuterol nebs Q 6  · Mucinex     GI:   · Bowel regimen: Miralax, senna, Dulcolax         :   PATRICK likely 2/2 ischemia vs Septic ATN vs Rhabdo  · Nephrology following and recommend HD M/W/F        F/E/N:  1   Hyperphosphatemia, started PhosLo by Nephro     2   Nutrition: On cardiac diet         ID:   · MSSA bacteremia , likely 2/2 MSSA PNA in the setting of positive Sputum Culture  · Day # 14 of Ancef, renally dosed, treat for at least 6 weeks, per ID     Heme:   · Thrombocytopenia improved  · Coumadin 5 mg daily  · Continue SCDs     Endo:   Blood sugars well controlled     Msk/Skin:  · Painful left great toe with DTI  · PT/OT following, recommends Post acute IP rehab      Nutrition Plan:   Cardiac Diet    VTE Pharmacologic Prophylaxis: Warfarin (Coumadin)  VTE Mechanical Prophylaxis: sequential compression device    Discharge Plan:   Per primary team               Spoke with Dr Klaus Shankar regarding transfer  Portions of the record may have been created with voice recognition software  Occasional wrong word or "sound a like" substitutions may have occurred due to the inherent limitations of voice recognition software  Read the chart carefully and recognize, using context, where substitutions have occurred      Amanda Frazier MD

## 2019-02-08 NOTE — HEMODIALYSIS
Dialysis treatment went well  BP 94//58  3L removed without difficulty   Was able to maintain a 350BFR

## 2019-02-08 NOTE — PLAN OF CARE
Problem: OCCUPATIONAL THERAPY ADULT  Goal: Performs self-care activities at highest level of function for planned discharge setting  See evaluation for individualized goals  Treatment Interventions: ADL retraining, Functional transfer training, UE strengthening/ROM, Endurance training, Cognitive reorientation, Patient/family training, Equipment evaluation/education, Fine motor coordination activities, Compensatory technique education, Continued evaluation, Energy conservation, Activityengagement          See flowsheet documentation for full assessment, interventions and recommendations  Outcome: Progressing  Limitation: Decreased ADL status, Decreased UE ROM, Decreased UE strength, Decreased Safe judgement during ADL, Decreased cognition, Decreased endurance, Decreased fine motor control, Decreased self-care trans, Decreased high-level ADLs  Prognosis: Fair  Assessment: Patient participated in Skilled OT session 2/8/19 with interventions consisting of ADL re training with the use of correct body mechnaics, Energy Conservation techniques, deep breathing technique, safety awareness and fall prevention techniques, therapeutic exercise to: increase functional use of BUEs, increase BUE muscle strength ,  therapeutic activities to: increase activity tolerance, increase dynamic sit/ stand balance during functional activity , increase postural control, increase trunk control and increase OOB/ sitting tolerance   Patient agreeable to OT treatment session, upon arrival patient was found supine in bed  In comparison to previous session, patient with improvements in bed mobility, activity tolerance, increased alertness/arousal and UE exercise  Patient requiring verbal cues for safety, verbal cues for correct technique, verbal cues for pacing thru activity steps, cognitive assistance to anticipate next step and frequent rest periods   Patient continues to be functioning below baseline level, occupational performance remains limited secondary to factors listed above and increased risk for falls and injury  From OT standpoint, recommendation at time of d/c would be Short Term Rehab when medically stable  Patient to benefit from continued Occupational Therapy treatment while in the hospital to address deficits as defined above and maximize level of functional independence with ADLs and functional mobility       OT Discharge Recommendation: Short Term Rehab  OT - OK to Discharge: Yes (when medically stable)      Comments: Radha Gomez MS, OTR/L

## 2019-02-09 LAB
ALBUMIN SERPL BCP-MCNC: 2.6 G/DL (ref 3.5–5)
ALP SERPL-CCNC: 71 U/L (ref 46–116)
ALT SERPL W P-5'-P-CCNC: 10 U/L (ref 12–78)
ANION GAP SERPL CALCULATED.3IONS-SCNC: 10 MMOL/L (ref 4–13)
ANION GAP SERPL CALCULATED.3IONS-SCNC: 14 MMOL/L (ref 4–13)
ANISOCYTOSIS BLD QL SMEAR: PRESENT
AST SERPL W P-5'-P-CCNC: 23 U/L (ref 5–45)
BASOPHILS # BLD MANUAL: 0 THOUSAND/UL (ref 0–0.1)
BASOPHILS NFR MAR MANUAL: 0 % (ref 0–1)
BILIRUB SERPL-MCNC: 0.42 MG/DL (ref 0.2–1)
BUN SERPL-MCNC: 73 MG/DL (ref 5–25)
BUN SERPL-MCNC: 82 MG/DL (ref 5–25)
CALCIUM SERPL-MCNC: 8 MG/DL (ref 8.3–10.1)
CALCIUM SERPL-MCNC: 8.1 MG/DL (ref 8.3–10.1)
CHLORIDE SERPL-SCNC: 95 MMOL/L (ref 100–108)
CHLORIDE SERPL-SCNC: 95 MMOL/L (ref 100–108)
CO2 SERPL-SCNC: 24 MMOL/L (ref 21–32)
CO2 SERPL-SCNC: 25 MMOL/L (ref 21–32)
CREAT SERPL-MCNC: 5.29 MG/DL (ref 0.6–1.3)
CREAT SERPL-MCNC: 5.66 MG/DL (ref 0.6–1.3)
EOSINOPHIL # BLD MANUAL: 0 THOUSAND/UL (ref 0–0.4)
EOSINOPHIL NFR BLD MANUAL: 0 % (ref 0–6)
ERYTHROCYTE [DISTWIDTH] IN BLOOD BY AUTOMATED COUNT: 17.9 % (ref 11.6–15.1)
GFR SERPL CREATININE-BSD FRML MDRD: 10 ML/MIN/1.73SQ M
GFR SERPL CREATININE-BSD FRML MDRD: 11 ML/MIN/1.73SQ M
GLUCOSE SERPL-MCNC: 119 MG/DL (ref 65–140)
GLUCOSE SERPL-MCNC: 88 MG/DL (ref 65–140)
GLUCOSE SERPL-MCNC: 96 MG/DL (ref 65–140)
HCT VFR BLD AUTO: 25 % (ref 36.5–49.3)
HGB BLD-MCNC: 7.5 G/DL (ref 12–17)
INR PPP: 1.68 (ref 0.86–1.17)
LYMPHOCYTES # BLD AUTO: 0.46 THOUSAND/UL (ref 0.6–4.47)
LYMPHOCYTES # BLD AUTO: 4 % (ref 14–44)
MAGNESIUM SERPL-MCNC: 2.6 MG/DL (ref 1.6–2.6)
MCH RBC QN AUTO: 26.1 PG (ref 26.8–34.3)
MCHC RBC AUTO-ENTMCNC: 30 G/DL (ref 31.4–37.4)
MCV RBC AUTO: 87 FL (ref 82–98)
MONOCYTES # BLD AUTO: 0.58 THOUSAND/UL (ref 0–1.22)
MONOCYTES NFR BLD: 5 % (ref 4–12)
NEUTROPHILS # BLD MANUAL: 10.44 THOUSAND/UL (ref 1.85–7.62)
NEUTS BAND NFR BLD MANUAL: 1 % (ref 0–8)
NEUTS SEG NFR BLD AUTO: 89 % (ref 43–75)
NRBC BLD AUTO-RTO: 0 /100 WBCS
PHOSPHATE SERPL-MCNC: 8.6 MG/DL (ref 2.7–4.5)
PLATELET # BLD AUTO: 234 THOUSANDS/UL (ref 149–390)
PLATELET BLD QL SMEAR: ADEQUATE
PMV BLD AUTO: 11.9 FL (ref 8.9–12.7)
POLYCHROMASIA BLD QL SMEAR: PRESENT
POTASSIUM SERPL-SCNC: 4.2 MMOL/L (ref 3.5–5.3)
POTASSIUM SERPL-SCNC: 4.2 MMOL/L (ref 3.5–5.3)
PROT SERPL-MCNC: 6.6 G/DL (ref 6.4–8.2)
PROTHROMBIN TIME: 19.9 SECONDS (ref 11.8–14.2)
RBC # BLD AUTO: 2.87 MILLION/UL (ref 3.88–5.62)
RBC MORPH BLD: PRESENT
SODIUM SERPL-SCNC: 130 MMOL/L (ref 136–145)
SODIUM SERPL-SCNC: 133 MMOL/L (ref 136–145)
VARIANT LYMPHS # BLD AUTO: 1 %
WBC # BLD AUTO: 11.6 THOUSAND/UL (ref 4.31–10.16)

## 2019-02-09 PROCEDURE — 99232 SBSQ HOSP IP/OBS MODERATE 35: CPT | Performed by: INTERNAL MEDICINE

## 2019-02-09 PROCEDURE — 94760 N-INVAS EAR/PLS OXIMETRY 1: CPT

## 2019-02-09 PROCEDURE — 84100 ASSAY OF PHOSPHORUS: CPT | Performed by: PHYSICIAN ASSISTANT

## 2019-02-09 PROCEDURE — 85027 COMPLETE CBC AUTOMATED: CPT | Performed by: PHYSICIAN ASSISTANT

## 2019-02-09 PROCEDURE — 85730 THROMBOPLASTIN TIME PARTIAL: CPT | Performed by: INTERNAL MEDICINE

## 2019-02-09 PROCEDURE — 85007 BL SMEAR W/DIFF WBC COUNT: CPT | Performed by: PHYSICIAN ASSISTANT

## 2019-02-09 PROCEDURE — 82948 REAGENT STRIP/BLOOD GLUCOSE: CPT

## 2019-02-09 PROCEDURE — 80053 COMPREHEN METABOLIC PANEL: CPT | Performed by: PHYSICIAN ASSISTANT

## 2019-02-09 PROCEDURE — 94660 CPAP INITIATION&MGMT: CPT

## 2019-02-09 PROCEDURE — 94640 AIRWAY INHALATION TREATMENT: CPT

## 2019-02-09 PROCEDURE — 83735 ASSAY OF MAGNESIUM: CPT | Performed by: PHYSICIAN ASSISTANT

## 2019-02-09 PROCEDURE — 80048 BASIC METABOLIC PNL TOTAL CA: CPT | Performed by: INTERNAL MEDICINE

## 2019-02-09 PROCEDURE — 85610 PROTHROMBIN TIME: CPT | Performed by: PHYSICIAN ASSISTANT

## 2019-02-09 RX ORDER — LEVALBUTEROL 1.25 MG/.5ML
1.25 SOLUTION, CONCENTRATE RESPIRATORY (INHALATION)
Status: DISCONTINUED | OUTPATIENT
Start: 2019-02-10 | End: 2019-02-10

## 2019-02-09 RX ORDER — WARFARIN SODIUM 5 MG/1
5 TABLET ORAL
Status: DISCONTINUED | OUTPATIENT
Start: 2019-02-09 | End: 2019-02-10

## 2019-02-09 RX ADMIN — METOPROLOL TARTRATE 75 MG: 50 TABLET, FILM COATED ORAL at 21:05

## 2019-02-09 RX ADMIN — IPRATROPIUM BROMIDE 0.5 MG: 0.5 SOLUTION RESPIRATORY (INHALATION) at 07:10

## 2019-02-09 RX ADMIN — LEVALBUTEROL 1.25 MG: 1.25 SOLUTION, CONCENTRATE RESPIRATORY (INHALATION) at 01:23

## 2019-02-09 RX ADMIN — QUETIAPINE FUMARATE 25 MG: 25 TABLET ORAL at 21:05

## 2019-02-09 RX ADMIN — CALCIUM ACETATE 1334 MG: 667 CAPSULE ORAL at 17:04

## 2019-02-09 RX ADMIN — NYSTATIN: 100000 POWDER TOPICAL at 11:05

## 2019-02-09 RX ADMIN — GUAIFENESIN 600 MG: 600 TABLET, EXTENDED RELEASE ORAL at 08:29

## 2019-02-09 RX ADMIN — LEVALBUTEROL 1.25 MG: 1.25 SOLUTION, CONCENTRATE RESPIRATORY (INHALATION) at 13:15

## 2019-02-09 RX ADMIN — IPRATROPIUM BROMIDE 0.5 MG: 0.5 SOLUTION RESPIRATORY (INHALATION) at 01:23

## 2019-02-09 RX ADMIN — WARFARIN SODIUM 5 MG: 5 TABLET ORAL at 17:04

## 2019-02-09 RX ADMIN — LEVALBUTEROL 1.25 MG: 1.25 SOLUTION, CONCENTRATE RESPIRATORY (INHALATION) at 07:10

## 2019-02-09 RX ADMIN — IPRATROPIUM BROMIDE 0.5 MG: 0.5 SOLUTION RESPIRATORY (INHALATION) at 19:34

## 2019-02-09 RX ADMIN — NYSTATIN 1 APPLICATION: 100000 POWDER TOPICAL at 17:06

## 2019-02-09 RX ADMIN — STANDARDIZED SENNA CONCENTRATE 8.6 MG: 8.6 TABLET ORAL at 21:04

## 2019-02-09 RX ADMIN — CEFAZOLIN SODIUM 1000 MG: 10 INJECTION, POWDER, FOR SOLUTION INTRAVENOUS at 17:03

## 2019-02-09 RX ADMIN — CALCIUM ACETATE 1334 MG: 667 CAPSULE ORAL at 08:29

## 2019-02-09 RX ADMIN — AMIODARONE HYDROCHLORIDE 200 MG: 200 TABLET ORAL at 08:29

## 2019-02-09 RX ADMIN — METOPROLOL TARTRATE 50 MG: 50 TABLET, FILM COATED ORAL at 08:29

## 2019-02-09 RX ADMIN — METOPROLOL TARTRATE 25 MG: 25 TABLET, FILM COATED ORAL at 11:05

## 2019-02-09 RX ADMIN — LEVALBUTEROL 1.25 MG: 1.25 SOLUTION, CONCENTRATE RESPIRATORY (INHALATION) at 19:34

## 2019-02-09 RX ADMIN — GUAIFENESIN 600 MG: 600 TABLET, EXTENDED RELEASE ORAL at 21:05

## 2019-02-09 RX ADMIN — IPRATROPIUM BROMIDE 0.5 MG: 0.5 SOLUTION RESPIRATORY (INHALATION) at 13:15

## 2019-02-09 RX ADMIN — POLYETHYLENE GLYCOL 3350 17 G: 17 POWDER, FOR SOLUTION ORAL at 08:29

## 2019-02-09 NOTE — SOCIAL WORK
TCF wife Anastacio Jesus, discussed DCP and STR    She would like referral to [facility] and [facility], referrals entered in [facility]

## 2019-02-09 NOTE — PLAN OF CARE
CARDIOVASCULAR - ADULT     Absence of cardiac dysrhythmias or at baseline rhythm Not Progressing        GENITOURINARY - ADULT     Maintains or returns to baseline urinary function Not Progressing        SAFETY ADULT     Maintain or return to baseline ADL function Not Progressing          CARDIOVASCULAR - ADULT     Maintains optimal cardiac output and hemodynamic stability Progressing        DISCHARGE PLANNING     Discharge to home or other facility with appropriate resources Progressing        DISCHARGE PLANNING - CARE MANAGEMENT     Discharge to post-acute care or home with appropriate resources Progressing        GENITOURINARY - ADULT     Absence of urinary retention Progressing        INFECTION - ADULT     Absence or prevention of progression during hospitalization Progressing        Knowledge Deficit     Patient/family/caregiver demonstrates understanding of disease process, treatment plan, medications, and discharge instructions Progressing        METABOLIC, FLUID AND ELECTROLYTES - ADULT     Electrolytes maintained within normal limits Progressing     Fluid balance maintained Progressing        Nutrition/Hydration-ADULT     Nutrient/Hydration intake appropriate for improving, restoring or maintaining nutritional needs Progressing        PAIN - ADULT     Verbalizes/displays adequate comfort level or baseline comfort level Progressing        Potential for Falls     Patient will remain free of falls Progressing        Prexisting or High Potential for Compromised Skin Integrity     Skin integrity is maintained or improved Progressing        SAFETY ADULT     Maintain or return mobility status to optimal level Progressing     Patient will remain free of falls Progressing

## 2019-02-09 NOTE — PROGRESS NOTES
Cardiology Progress Note - Veronica Burkett 60 y o  male MRN: [id number]    Unit/Bed#: [facility] 507-01 Encounter: [id number]      Assessment:  Active Problems:    Increased BMI    NSTEMI (non-ST elevated myocardial infarction) ([address] )    ESRD (end stage renal disease) ([facility])    Stress-induced cardiomyopathy    Acute respiratory failure with hypoxia ([facility])    History of aortic valve replacement with bioprosthetic valve    Atrial fibrillation (HCC)    Staphylococcus aureus bacteremia    Transaminitis    Thrombocytopenia ([facility])    Anemia    Leukocytosis        Plan:  1  HFrEF- LVEF-35-40%, LVIDd-6 5 cm- Cardiomyopathy in the setting of Septic shock with possible component of cardiogenic shock- Biventricular failure  - Clinically warm with dependent edema  - Stable off Milrinone  - no accurate ins and outs  Recommend volume removal with dialysis  3 L removed yesterday  - Echo 1/25- Upper normal LV size with LVEF-30%, Concentric hypertrophy, Dilated RV with reduced function, Dyssynergic septum  Bioprosthetic aortic valve- not very well visualized- mean gradient of 13  - His cardiomyopathy is predominantly sepsis mediated myocardial dysfunction    2  New onset Atrial fibrillation in the setting of sepsis  - On oral amiodarone 200mg daily  - increase metoprolol tartrate to 75 mg twice daily (home doses 100 mg twice daily)  - anticoagulation with IV heparin-with transition to Coumadin- INR yesterday 1 34, recheck INR tomorrow- also had drop in hemoglobin since 02/02- need to monitor closely with workup for anemia    3  MSSSA bacteremia with possible source being pneumonia  - On Cefazolin   - Sputum with staph aureus, 1/27- blood cultures- no growth  - CHON with no evidence of endocarditis       4  Bioprosthetic AVR for severe degenerative AS- 25 mm Champagne Magna in July 2014  - Gradients across the valve in the past have been around 21, current gradients are 13- CHON- no endocarditis     5   Troponin elevation  - Troponins elevated to above 40, this is in the setting of septic shock  - EKG with no ischemic changes  - Normal cath in 2014  May need outpatient stress test      6  PATRICK- in the setting of septic shock  - on  HD    7  Microcytic anemia         Subjective:   Patient seen and examined  Transferred out of the unit to the floor today  Resting in bed  Denies any symptoms of chest pain or pressure  No shortness of breath at rest   No palpitations  Continues to have swelling of the upper and lower extremities  Discussed new onset AFib and requirement of anticoagulation with the patient and risk benefits of anticoagulation in detail  Telemetry- Atrial fibrillation with ventricular rates of 70-80 however this morning had RVR up to 130    Objective:     Vitals: Blood pressure 153/71, pulse (!) 137, temperature 98 1 °F (36 7 °C), temperature source Axillary, resp  rate 16, height 5' 9" (1 753 m), weight (!) 167 kg (367 lb 4 6 oz), SpO2 99 %  , Body mass index is 54 24 kg/m² ,   Orthostatic Blood Pressures      Most Recent Value   Blood Pressure  153/71 filed at 02/09/2019 8735   Patient Position - Orthostatic VS  Lying filed at 02/09/2019 0300            Intake/Output Summary (Last 24 hours) at 02/09/19 1028  Last data filed at 02/09/19 0851   Gross per 24 hour   Intake           885 98 ml   Output                0 ml   Net           885 98 ml         Physical Exam:    GEN: Wilberto Suggs on nasal cannula,  awake and alert, pleasant and cooperative  HEENT: anicteric, mucous membranes moist  NECK: no jvd, no carotid bruits, left central line  HEART: Irregular rhythm, normal S1 and S2,  no murmurs, clicks, gallops or rubs   LUNGS: rhonchi in the anterior lung fields  ABDOMEN: normal bowel sounds, soft, no tenderness, no distention  EXTREMITIES: peripheral pulses 1+; bluish discoloration of the toes, warm feet, 2+ dependent edema of bilateral upper and lower extremities  NEURO: no focal findings   SKIN: warm      Current Facility-Administered Medications:     acetaminophen (TYLENOL) tablet 650 mg, 650 mg, Oral, Q6H PRN, Tarik Ruffin MD, 650 mg at 02/07/19 1149    albuterol inhalation solution 2 5 mg, 2 5 mg, Nebulization, Q4H PRN, Juvencio Teran PA-C    amiodarone tablet 200 mg, 200 mg, Oral, Daily With Breakfast, Katy Leiva MD, 200 mg at 02/09/19 0511    bisacodyl (DULCOLAX) rectal suppository 10 mg, 10 mg, Rectal, Daily PRN, Juvencio Teran PA-C    calcium acetate (PHOSLO) capsule 1,334 mg, 1,334 mg, Oral, BID, Niko Amaro MD, 1,334 mg at 02/09/19 0829    ceFAZolin (ANCEF) 1 g in sodium chloride 0 9% 50 ml IVPB, 1,000 mg, Intravenous, Q24H, Tarik Ruffin MD, Last Rate: 100 mL/hr at 02/08/19 1756, 1,000 mg at 02/08/19 1756    guaiFENesin (MUCINEX) 12 hr tablet 600 mg, 600 mg, Oral, Q12H [facility], Olive Umanzor PA-C, 600 mg at 02/09/19 0829    HYDROmorphone (DILAUDID) injection 1 mg, 1 mg, Intravenous, Q3H PRN, Lidya Martinez, 1 mg at 02/04/19 1819    ipratropium (ATROVENT) 0 02 % inhalation solution 0 5 mg, 0 5 mg, Nebulization, Q6H, Adenike Hernandez PA-C, 0 5 mg at 02/09/19 0710    levalbuterol (XOPENEX) inhalation solution 1 25 mg, 1 25 mg, Nebulization, Q6H, Adenike Hernandez PA-C, 1 25 mg at 02/09/19 0710    metoprolol (LOPRESSOR) injection 5 mg, 5 mg, Intravenous, Q6H PRN, Savannah Oro DO, 5 mg at 02/08/19 1706    metoprolol tartrate (LOPRESSOR) tablet 50 mg, 50 mg, Oral, Q12H [facility], Katy Leiva MD, 50 mg at 02/09/19 9227    nystatin (MYCOSTATIN) powder, , Topical, BID, Juvencio Teran PA-C, 1 application at 87/76/07 1800    oxyCODONE (ROXICODONE) immediate release tablet 10 mg, 10 mg, Oral, Q4H PRN, Tarik Ruffin MD, 10 mg at 02/08/19 1947    polyethylene glycol (MIRALAX) packet 17 g, 17 g, Oral, Daily, KATHY Mathias, 17 g at 02/09/19 9647    polyvinyl alcohol (LIQUIFILM TEARS) 1 4 % ophthalmic solution 1 drop, 1 drop, Both Eyes, Q3H PRN, Francisco Holloway HERBERT Parrish, 1 drop at 02/07/19 0813    QUEtiapine (SEROquel) tablet 25 mg, 25 mg, Oral, HS, Karina Chaves PA-C, 25 mg at 02/08/19 2209    senna (SENOKOT) tablet 8 6 mg, 1 tablet, Oral, HS, Sheldon Ordaz MD, 8 6 mg at 02/08/19 2209    warfarin (COUMADIN) tablet 5 mg, 5 mg, Oral, Daily (warfarin), Paresh Marc MD    Labs & Results:    Lab Results   Component Value Date    CKTOTAL 463 (H) 02/01/2019    CKTOTAL 3,180 (H) 01/27/2019    CKTOTAL 10,421 (H) 01/26/2019    CKMB 3 9 02/01/2019    CKMB 14 9 (H) 01/27/2019    CKMB 38 7 (H) 01/26/2019    CKMBINDEX <1 0 02/01/2019    CKMBINDEX <1 0 01/27/2019    CKMBINDEX <1 0 01/26/2019    TROPONINI 36 20 (H) 01/24/2019    TROPONINI 36 30 (H) 01/24/2019    TROPONINI 34 90 (H) 01/24/2019       Lab Results   Component Value Date    GLUCOSE 92 04/09/2015    CALCIUM 8 0 (L) 02/09/2019     04/09/2015    K 4 2 02/09/2019    CO2 24 02/09/2019    CL 95 (L) 02/09/2019    BUN 73 (H) 02/09/2019    CREATININE 5 29 (H) 02/09/2019       Lab Results   Component Value Date    WBC 11 60 (H) 02/09/2019    HGB 7 5 (L) 02/09/2019    HCT 25 0 (L) 02/09/2019    MCV 87 02/09/2019     02/09/2019       Results from last 7 days  Lab Units 02/09/19  0548   INR  1 68*       Lab Results   Component Value Date    CHOL 146 07/18/2014    CHOL 145 02/21/2014     Lab Results   Component Value Date    HDL 41 06/02/2018    HDL 52 06/27/2017     Lab Results   Component Value Date    LDLCALC 57 06/02/2018    LDLCALC 129 (H) 06/27/2017     Lab Results   Component Value Date    TRIG 102 06/02/2018    TRIG 71 06/27/2017       Lab Results   Component Value Date    ALT 10 (L) 02/09/2019    AST 23 02/09/2019

## 2019-02-09 NOTE — PROGRESS NOTES
NEPHROLOGY PROGRESS NOTE    Rell Moran 60 y o  male MRN: [id number]  Unit/Bed#: [facility] 507-01 Encounter: [id number]  Reason for Consult:  Acute renal failure    Patient has been transferred out of the unit and is relatively stable tolerated dialysis with volume removal yesterday  ASSESSMENT/PLAN:  1  Renal  The patient has acute renal failure due to cardiogenic shock and ATN  At this point is dialysis dependent was done 3 days in a row with volume removal   He remains relatively stable from a respiratory standpoint  Will continue with hemodialysis  with volume removal as tolerated  2  Cardiac  The patient has congestive heart failure and history of aortic valve replacement  Also being managed for atrial fibrillation by Cardiology  3  Staph aureus bacteremia  On antibiotic therapy  SUBJECTIVE:  Review of Systems   Constitution: Positive for weakness  Negative for chills and fever  HENT: Negative  Eyes: Negative  Cardiovascular: Positive for orthopnea  Negative for chest pain  Respiratory: Negative for cough and shortness of breath  Gastrointestinal: Negative  OBJECTIVE:  Current Weight: Weight - Scale: (!) 167 kg (367 lb 4 6 oz)  Vitals:Temp (24hrs), Av 9 °F (36 6 °C), Min:97 4 °F (36 3 °C), Max:98 3 °F (36 8 °C)  Current: Temperature: 98 3 °F (36 8 °C)   Blood pressure 117/68, pulse 67, temperature 98 3 °F (36 8 °C), resp  rate 22, height 5' 9" (1 753 m), weight (!) 167 kg (367 lb 4 6 oz), SpO2 96 %  , Body mass index is 54 24 kg/m²  Intake/Output Summary (Last 24 hours) at 19 1341  Last data filed at 19 1051   Gross per 24 hour   Intake           498 32 ml   Output                0 ml   Net           498 32 ml       Physical Exam: /68   Pulse 67   Temp 98 3 °F (36 8 °C)   Resp 22   Ht 5' 9" (1 753 m)   Wt (!) 167 kg (367 lb 4 6 oz)   SpO2 96%   BMI 54 24 kg/m²   Physical Exam   Constitutional: No distress     HENT: Mouth/Throat: No oropharyngeal exudate  Neck: No JVD present  Cardiovascular: Normal rate  Exam reveals no friction rub  Pulmonary/Chest: Effort normal  No respiratory distress  Decreased breath sounds bases  Abdominal: Soft  Bowel sounds are normal  There is no tenderness         Medications:    Current Facility-Administered Medications:     acetaminophen (TYLENOL) tablet 650 mg, 650 mg, Oral, Q6H PRN, Sheldon Ordaz MD, 650 mg at 02/07/19 1149    albuterol inhalation solution 2 5 mg, 2 5 mg, Nebulization, Q4H PRN, Masha Bahena PA-C    amiodarone tablet 200 mg, 200 mg, Oral, Daily With Breakfast, Adella Cranker, MD, 200 mg at 02/09/19 1967    bisacodyl (DULCOLAX) rectal suppository 10 mg, 10 mg, Rectal, Daily PRN, Masha Bahena PA-C    calcium acetate (PHOSLO) capsule 1,334 mg, 1,334 mg, Oral, BID, Cade Bender MD, 1,334 mg at 02/09/19 0829    ceFAZolin (ANCEF) 1 g in sodium chloride 0 9% 50 ml IVPB, 1,000 mg, Intravenous, Q24H, Sheldon Ordaz MD, Last Rate: 100 mL/hr at 02/08/19 1756, 1,000 mg at 02/08/19 1756    guaiFENesin (MUCINEX) 12 hr tablet 600 mg, 600 mg, Oral, Q12H [facility], Olive Umanzor PA-C, 600 mg at 02/09/19 0829    HYDROmorphone (DILAUDID) injection 1 mg, 1 mg, Intravenous, Q3H PRN, Lidya Martinez, 1 mg at 02/04/19 1819    ipratropium (ATROVENT) 0 02 % inhalation solution 0 5 mg, 0 5 mg, Nebulization, Q6H, Karina Chaves PA-C, 0 5 mg at 02/09/19 1315    levalbuterol (XOPENEX) inhalation solution 1 25 mg, 1 25 mg, Nebulization, Q6H, Karina Chaves PA-C, 1 25 mg at 02/09/19 1315    metoprolol (LOPRESSOR) injection 5 mg, 5 mg, Intravenous, Q6H PRN, Savannah Oro DO, 5 mg at 02/08/19 1706    metoprolol tartrate (LOPRESSOR) tablet 75 mg, 75 mg, Oral, Q12H [facility], Adella Cranker, MD    nystatin (MYCOSTATIN) powder, , Topical, BID, Masha Bahena PA-C    oxyCODONE (ROXICODONE) immediate release tablet 10 mg, 10 mg, Oral, Q4H PRN, Anise Olman MD Rubina, 10 mg at 02/08/19 1947    polyethylene glycol (MIRALAX) packet 17 g, 17 g, Oral, Daily, KATHY Mathias, 17 g at 02/09/19 8914    polyvinyl alcohol (LIQUIFILM TEARS) 1 4 % ophthalmic solution 1 drop, 1 drop, Both Eyes, Q3H PRN, Leonila Fischer PA-C, 1 drop at 02/07/19 0813    QUEtiapine (SEROquel) tablet 25 mg, 25 mg, Oral, HS, Leonila Fischer PA-C, 25 mg at 02/08/19 2209    senna (SENOKOT) tablet 8 6 mg, 1 tablet, Oral, HS, Amilcar Santos MD, 8 6 mg at 02/08/19 2209    warfarin (COUMADIN) tablet 5 mg, 5 mg, Oral, Daily (warfarin), Josee Livingston MD    Laboratory Results:  Lab Results   Component Value Date    WBC 11 60 (H) 02/09/2019    HGB 7 5 (L) 02/09/2019    HCT 25 0 (L) 02/09/2019    MCV 87 02/09/2019     02/09/2019     Lab Results   Component Value Date    GLUCOSE 92 04/09/2015    CALCIUM 8 1 (L) 02/09/2019     04/09/2015    K 4 2 02/09/2019    CO2 25 02/09/2019    CL 95 (L) 02/09/2019    BUN 82 (H) 02/09/2019    CREATININE 5 66 (H) 02/09/2019     Lab Results   Component Value Date    CALCIUM 8 1 (L) 02/09/2019    PHOS 8 6 (H) 02/09/2019     No results found for: LABPROT

## 2019-02-09 NOTE — PROGRESS NOTES
[address] Hospitalist Service - Internal Medicine Progress Note       PATIENT INFORMATION      Patient: Jenifer Mon 60 y o  male   MRN: [id number]  PCP: Eliz Jackson DO  Unit/Bed#: [facility] 507-01 Encounter: [id number]  Date Of Visit: 02/09/19       ASSESSMENTS & PLAN       1  Multifactorial shock (Cardiogenic/Septic)  · S/p transfer out of ICU yesterday - hemodynamically stable   · See plans for cardiomyopathy/bacteremia below  · Initially presented with high-grade fever coupled with tachycardia/leukocytosis, lactic acidosis, and hypotension (component of cardiogenic shock attributed as well) requiring multiple vasopressors for hemodynamic stability - initial troponin was markedly elevated at 369 9 and progressively downtrended    2  MSSA bacteremia  · Last repeat blood cultures from 1/27 were negative - continue IV Ancef regimen per Infectious Disease for a six-week course through 3/10  · CHON negative for evidence of endocarditis     3  Acute renal failure - Hyperphosphatemia  · Currently dialysis dependent - nephrology following  · Continue PhosLo for his hyperphosphatemia  · Limit/avoid nephrotoxins as possible - renally dose antibiotic regimen    4  Acute systolic CHF - Cardiomyopathy  · Status post transfer out of ICU yesterday - currently dialysis dependent for fluid removal   · Titrated off Milrinone drip previously - continue beta-blockade with Lopressor  · EF of 30% with severe diffuse LV hypokinesis and mild TR  · Cardiology following - attributes cardiomyopathy to shock/tachycardia    5  Acute respiratory failure with hypoxia  · Status post extubation currently saturating on 4 L via nasal cannula at rest  · Continue to maintain oxygenation and titrate downwards as tolerated    6  Abnormal LFTs  · Normalized - likely an initial sequela of shock   · Hepatitis panel negative      7  Morbid obesity  · BMI of 54 24  · Lifestyle/diet modifications    8    New onset atrial fibrillation  · Appreciate cardiology input - currently back in normal sinus rhythm on Lopressor/Amiodarone  · Continue Coumadin for anticoagulation   · Likely triggered by septic/cardiogenic shock    9  Non-MI troponin elevation  · Troponin peak was > 40   · Multifactorial secondary to shock coupled with acute renal failure and new onset atrial fibrillation     10  History of bioprosthetic AVR  · CHON negative for vegetation in the setting of septic shock      VTE Prophylaxis:  Coumadin      SUBJECTIVE     Seen/examined earlier in the day  No significant overnight events status post transfer out of ICU yesterday  Remains generally weak/fatigued  The patient self denies any new complaints at this time  He expresses gratitude for the care he has received thus far  OBJECTIVE     Vitals:   Temp (24hrs), Av 9 °F (36 6 °C), Min:97 4 °F (36 3 °C), Max:98 3 °F (36 8 °C)    Temp:  [97 4 °F (36 3 °C)-98 3 °F (36 8 °C)] 98 3 °F (36 8 °C)  HR:  [] 73  Resp:  [16-34] 22  BP: (117-153)/(63-99) 117/68  SpO2:  [95 %-100 %] 96 %  Body mass index is 54 24 kg/m²  Input and Output Summary (last 24 hours):        Intake/Output Summary (Last 24 hours) at 19 1457  Last data filed at 19 1455   Gross per 24 hour   Intake           648 94 ml   Output                0 ml   Net           648 94 ml       Physical Exam:     GENERAL:  Obese  HEAD:  Normocephalic - atraumatic  EYES: PERRL - EOMI   MOUTH:  Mucosa moist  NECK:  Supple - full range of motion  CARDIAC:  Regular rate/rhythm - S1/S2 positive  PULMONARY:  Diminished bibasilar breath sounds - decreased respiratory effort due to body habitus  ABDOMEN:  Soft - nontender/nondistended - active bowel sounds  MUSCULOSKELETAL:  Motor strength/range of motion markedly deconditioned - diffuse extremity pitting edema  NEUROLOGIC:  Alert/awake - weak/fatigued  SKIN:  Chronic wrinkles/blemishes - left hallux deep tissue injury noted      ADDITIONAL DATA       Labs & Recent Cultures:       Results from last 7 days  Lab Units 02/09/19  0548   WBC Thousand/uL 11 60*   HEMOGLOBIN g/dL 7 5*   HEMATOCRIT % 25 0*   PLATELETS Thousands/uL 234   LYMPHO PCT % 4*   MONO PCT % 5   EOS PCT % 0       Results from last 7 days  Lab Units 02/09/19  1113 02/09/19  0548   SODIUM mmol/L 130* 133*   POTASSIUM mmol/L 4 2 4 2   CHLORIDE mmol/L 95* 95*   CO2 mmol/L 25 24   BUN mg/dL 82* 73*   CREATININE mg/dL 5 66* 5 29*   CALCIUM mg/dL 8 1* 8 0*   ALK PHOS U/L  --  71   ALT U/L  --  10*   AST U/L  --  23       Results from last 7 days  Lab Units 02/09/19  0548   INR  1 68*         Last 24 Hours Medication List:     Current Facility-Administered Medications:  acetaminophen 650 mg Oral Q6H PRN Ilene Llamas MD    albuterol 2 5 mg Nebulization Q4H PRN Christiano Kruse PA-C    amiodarone 200 mg Oral Daily With Breakfast Kade Cadena MD    bisacodyl 10 mg Rectal Daily PRN Christiano Kruse PA-C    calcium acetate 1,334 mg Oral BID Eliz Rodriguez MD    cefazolin 1,000 mg Intravenous Q24H Ilene Llamas MD Last Rate: 1,000 mg (02/08/19 1756)   guaiFENesin 600 mg Oral Q12H [facility] Olive Umanzor PA-C    HYDROmorphone 1 mg Intravenous Q3H PRN Lidya Martinez    ipratropium 0 5 mg Nebulization Q6H Charmaine Santana PA-C    levalbuterol 1 25 mg Nebulization Q6H Charmaine Santana PA-C    metoprolol 5 mg Intravenous Q6H PRN Savannah Oro DO    metoprolol tartrate 75 mg Oral Q12H [facility] Kade Cadena MD    nystatin  Topical BID Christiano Kruse PA-C    oxyCODONE 10 mg Oral Q4H PRN Ilene Llamas MD    polyethylene glycol 17 g Oral Daily KATHY Mathias    polyvinyl alcohol 1 drop Both Eyes Q3H PRN Charmaine Max, PA-C    QUEtiapine 25 mg Oral HS CECILE Vogt-C    senna 1 tablet Oral HS Ilene Llamas MD    warfarin 5 mg Oral Daily (warfarin) Karissa oHbbs MD           Time Spent for Care: 33 minutes    More than 50% of total time spent on counseling and coordination of care as described above  Current Length of Stay: 16 day(s)      Code Status: Level 1 - Full Code         ** Please Note: This note is constructed using a voice recognition dictation system   **

## 2019-02-10 ENCOUNTER — APPOINTMENT (INPATIENT)
Dept: RADIOLOGY | Facility: HOSPITAL | Age: 59
DRG: 871 | End: 2019-02-10
Payer: COMMERCIAL

## 2019-02-10 LAB
BASOPHILS # BLD AUTO: 0.02 THOUSANDS/ΜL (ref 0–0.1)
BASOPHILS NFR BLD AUTO: 0 % (ref 0–1)
EOSINOPHIL # BLD AUTO: 0.03 THOUSAND/ΜL (ref 0–0.61)
EOSINOPHIL NFR BLD AUTO: 0 % (ref 0–6)
ERYTHROCYTE [DISTWIDTH] IN BLOOD BY AUTOMATED COUNT: 18.1 % (ref 11.6–15.1)
HCT VFR BLD AUTO: 24.7 % (ref 36.5–49.3)
HGB BLD-MCNC: 7.5 G/DL (ref 12–17)
IMM GRANULOCYTES # BLD AUTO: 0.2 THOUSAND/UL (ref 0–0.2)
IMM GRANULOCYTES NFR BLD AUTO: 2 % (ref 0–2)
INR PPP: 3.61 (ref 0.86–1.17)
LYMPHOCYTES # BLD AUTO: 0.44 THOUSANDS/ΜL (ref 0.6–4.47)
LYMPHOCYTES NFR BLD AUTO: 4 % (ref 14–44)
MAGNESIUM SERPL-MCNC: 2.8 MG/DL (ref 1.6–2.6)
MCH RBC QN AUTO: 26.7 PG (ref 26.8–34.3)
MCHC RBC AUTO-ENTMCNC: 30.4 G/DL (ref 31.4–37.4)
MCV RBC AUTO: 88 FL (ref 82–98)
MONOCYTES # BLD AUTO: 0.88 THOUSAND/ΜL (ref 0.17–1.22)
MONOCYTES NFR BLD AUTO: 8 % (ref 4–12)
NEUTROPHILS # BLD AUTO: 9.45 THOUSANDS/ΜL (ref 1.85–7.62)
NEUTS SEG NFR BLD AUTO: 86 % (ref 43–75)
NRBC BLD AUTO-RTO: 0 /100 WBCS
PHOSPHATE SERPL-MCNC: 8.9 MG/DL (ref 2.7–4.5)
PLATELET # BLD AUTO: 234 THOUSANDS/UL (ref 149–390)
PMV BLD AUTO: 12 FL (ref 8.9–12.7)
PROTHROMBIN TIME: 36 SECONDS (ref 11.8–14.2)
RBC # BLD AUTO: 2.81 MILLION/UL (ref 3.88–5.62)
WBC # BLD AUTO: 11.02 THOUSAND/UL (ref 4.31–10.16)

## 2019-02-10 PROCEDURE — 94660 CPAP INITIATION&MGMT: CPT

## 2019-02-10 PROCEDURE — 83735 ASSAY OF MAGNESIUM: CPT | Performed by: INTERNAL MEDICINE

## 2019-02-10 PROCEDURE — 99232 SBSQ HOSP IP/OBS MODERATE 35: CPT | Performed by: INTERNAL MEDICINE

## 2019-02-10 PROCEDURE — 70450 CT HEAD/BRAIN W/O DYE: CPT

## 2019-02-10 PROCEDURE — 84100 ASSAY OF PHOSPHORUS: CPT | Performed by: INTERNAL MEDICINE

## 2019-02-10 PROCEDURE — 85610 PROTHROMBIN TIME: CPT | Performed by: INTERNAL MEDICINE

## 2019-02-10 PROCEDURE — 85025 COMPLETE CBC W/AUTO DIFF WBC: CPT | Performed by: INTERNAL MEDICINE

## 2019-02-10 RX ORDER — LEVALBUTEROL 1.25 MG/.5ML
1.25 SOLUTION, CONCENTRATE RESPIRATORY (INHALATION) EVERY 6 HOURS PRN
Status: DISCONTINUED | OUTPATIENT
Start: 2019-02-10 | End: 2019-02-18

## 2019-02-10 RX ADMIN — GUAIFENESIN 600 MG: 600 TABLET, EXTENDED RELEASE ORAL at 21:40

## 2019-02-10 RX ADMIN — AMIODARONE HYDROCHLORIDE 200 MG: 200 TABLET ORAL at 08:04

## 2019-02-10 RX ADMIN — NYSTATIN: 100000 POWDER TOPICAL at 17:44

## 2019-02-10 RX ADMIN — STANDARDIZED SENNA CONCENTRATE 8.6 MG: 8.6 TABLET ORAL at 21:40

## 2019-02-10 RX ADMIN — GUAIFENESIN 600 MG: 600 TABLET, EXTENDED RELEASE ORAL at 08:04

## 2019-02-10 RX ADMIN — CALCIUM ACETATE 1334 MG: 667 CAPSULE ORAL at 08:04

## 2019-02-10 RX ADMIN — QUETIAPINE FUMARATE 25 MG: 25 TABLET ORAL at 21:40

## 2019-02-10 RX ADMIN — NYSTATIN: 100000 POWDER TOPICAL at 08:26

## 2019-02-10 RX ADMIN — CEFAZOLIN SODIUM 1000 MG: 10 INJECTION, POWDER, FOR SOLUTION INTRAVENOUS at 17:44

## 2019-02-10 RX ADMIN — CALCIUM ACETATE 1334 MG: 667 CAPSULE ORAL at 17:44

## 2019-02-10 RX ADMIN — POLYETHYLENE GLYCOL 3350 17 G: 17 POWDER, FOR SOLUTION ORAL at 08:06

## 2019-02-10 RX ADMIN — OXYCODONE HYDROCHLORIDE 10 MG: 10 TABLET ORAL at 10:17

## 2019-02-10 RX ADMIN — METOPROLOL TARTRATE 75 MG: 50 TABLET, FILM COATED ORAL at 08:04

## 2019-02-10 RX ADMIN — OXYCODONE HYDROCHLORIDE 10 MG: 10 TABLET ORAL at 19:36

## 2019-02-10 RX ADMIN — METOPROLOL TARTRATE 75 MG: 50 TABLET, FILM COATED ORAL at 21:40

## 2019-02-10 NOTE — PROGRESS NOTES
[address] Hospitalist Service - Internal Medicine Progress Note       PATIENT INFORMATION      Patient: Sander Langford 60 y o  male   MRN: [id number]  PCP: Biagio Closs, DO  Unit/Bed#: [facility] 507-01 Encounter: [id number]  Date Of Visit: 02/10/19       ASSESSMENTS & PLAN       1  Multifactorial shock (Cardiogenic/Septic)  · S/p transfer out of ICU on 2/8 - hemodynamically stable   · See plans for cardiomyopathy/bacteremia below  · Initially presented with high-grade fever coupled with tachycardia/leukocytosis, lactic acidosis, and hypotension (component of cardiogenic shock attributed as well) requiring multiple vasopressors for hemodynamic stability - initial troponin was markedly elevated at 369 9 and progressively downtrended    2  Toxic metabolic encephalopathy  · Confusion waxing/waning over the last day - sluggish to responses - pending CT of head  · Likely multifactorial secondary to multiple medical issues  · Limit/avoid analgesics/sedatives unless absolutely necessary  · Continue clinical monitoring    3  MSSA bacteremia  · Last repeat blood cultures from 1/27 were negative - continue IV Ancef regimen per Infectious Disease for a six-week course through 3/10  · CHON negative for evidence of endocarditis     4  Acute renal failure - Hyperphosphatemia  · Currently dialysis dependent - nephrology following  · Continue PhosLo for his hyperphosphatemia  · Limit/avoid nephrotoxins as possible - renally dose antibiotic regimen    5  Acute systolic CHF - Cardiomyopathy  · Status post transfer out of ICU on 2/8 - currently dialysis dependent for fluid removal   · Titrated off Milrinone drip previously - continue beta-blockade with Lopressor  · EF of 30% with severe diffuse LV hypokinesis and mild TR  · Cardiology following - attributes cardiomyopathy to shock/tachycardia    6    Acute respiratory failure with hypoxia  · Status post extubation currently saturating on 4 L via nasal cannula at rest  · Continue to maintain oxygenation and titrate downwards if possible  · BIPAP QHS as necessary     7  Abnormal LFTs  · Normalized - likely an initial sequela of shock   · Hepatitis panel negative      8  Morbid obesity  · BMI of 54 24  · Lifestyle/diet modifications    9  New onset atrial fibrillation  · Appreciate cardiology input - currently back in normal sinus rhythm on Lopressor/Amiodarone  · Continue Coumadin for anticoagulation for INR target of 2-3 - continue to hold dosing as INR supratherapeutic in the setting of continued antibiotic use (Ancef)   · Likely triggered by septic/cardiogenic shock    10  Non-MI troponin elevation  · Troponin peak was > 40   · Multifactorial secondary to shock coupled with acute renal failure and new onset atrial fibrillation     11  History of bioprosthetic AVR  · CHON negative for vegetation in the setting of septic shock      VTE Prophylaxis:  Supratherapeutic INR      SUBJECTIVE     Seen/examined earlier this morning during nursing rounds  Though he responds appropriately to questions his responses are sluggish/delayed  Complains of generalized weakness/fatigue  OBJECTIVE     Vitals:   Temp (24hrs), Av 2 °F (36 8 °C), Min:97 6 °F (36 4 °C), Max:98 6 °F (37 °C)    Temp:  [97 6 °F (36 4 °C)-98 6 °F (37 °C)] 97 8 °F (36 6 °C)  HR:  [] 78  Resp:  [16-22] 16  BP: (120-151)/(61-97) 131/64  SpO2:  [96 %-99 %] 96 %  Body mass index is 53 98 kg/m²  Input and Output Summary (last 24 hours):        Intake/Output Summary (Last 24 hours) at 2/10/2019 1433  Last data filed at 2/10/2019 1216  Gross per 24 hour   Intake 700 ml   Output 0 ml   Net 700 ml       Physical Exam:     GENERAL:  Obese  HEAD:  Normocephalic - atraumatic  EYES: PERRL - EOMI   MOUTH:  Mucosa moist  NECK:  Supple - full range of motion  CARDIAC:  Regular rate/rhythm - S1/S2 positive  PULMONARY:  Diminished but fairly clear to auscultation bilaterally - decreased respiratory effort due to body habitus  ABDOMEN:  Soft - nontender/nondistended - active bowel sounds  MUSCULOSKELETAL:  Motor strength/range of motion markedly deconditioned - diffuse extremity pitting edema  NEUROLOGIC:  Alert/awake - weak/fatigued  SKIN:  Chronic wrinkles/blemishes - left hallux deep tissue injury present      ADDITIONAL DATA       Labs & Recent Cultures:     Results from last 7 days   Lab Units 02/10/19  0456   WBC Thousand/uL 11 02*   HEMOGLOBIN g/dL 7 5*   HEMATOCRIT % 24 7*   PLATELETS Thousands/uL 234   NEUTROS PCT % 86*   LYMPHS PCT % 4*   MONOS PCT % 8   EOS PCT % 0     Results from last 7 days   Lab Units 02/09/19  1113 02/09/19  0548   SODIUM mmol/L 130* 133*   POTASSIUM mmol/L 4 2 4 2   CHLORIDE mmol/L 95* 95*   CO2 mmol/L 25 24   BUN mg/dL 82* 73*   CREATININE mg/dL 5 66* 5 29*   CALCIUM mg/dL 8 1* 8 0*   ALK PHOS U/L  --  71   ALT U/L  --  10*   AST U/L  --  23     Results from last 7 days   Lab Units 02/10/19  0456   INR  3 61*         Last 24 Hours Medication List:     Current Facility-Administered Medications:  acetaminophen 650 mg Oral Q6H PRN Nahun Evans MD    amiodarone 200 mg Oral Daily With Breakfast Essie Cr MD    bisacodyl 10 mg Rectal Daily PRN Cassandra Werner PA-C    calcium acetate 1,334 mg Oral BID Bianca Glez MD    cefazolin 1,000 mg Intravenous Q24H Nahun Evans MD Last Rate: 1,000 mg (02/09/19 1703)   guaiFENesin 600 mg Oral Q12H [facility] Olive Umanzor PA-C    HYDROmorphone 1 mg Intravenous Q3H PRN Lidya Martinez    ipratropium 0 5 mg Nebulization Q6H PRN Aixa Latham MD    levalbuterol 1 25 mg Nebulization Q6H PRN Aixa Latham MD    metoprolol 5 mg Intravenous Q6H PRN Savannah Oro DO    metoprolol tartrate 75 mg Oral Q12H [facility] Essie Cr MD    nystatin  Topical BID Cassandra Werner PA-C    oxyCODONE 10 mg Oral Q4H PRN Nahun Evans MD    polyethylene glycol 17 g Oral Daily KATHY Mathias    polyvinyl alcohol 1 drop Both Eyes Q3H PRN Janalyn Oppenheim, PA-C    QUEtiapine 25 mg Oral HS Janalyn Oppenheim, PA-C    senna 1 tablet Oral HS Michael Kendrick MD           Time Spent for Care: 33 minutes  More than 50% of total time spent on counseling and coordination of care as described above  Current Length of Stay: 17 day(s)      Code Status: Level 1 - Full Code         ** Please Note: This note is constructed using a voice recognition dictation system   **

## 2019-02-10 NOTE — PROGRESS NOTES
Cardiology Progress Note - Yulissa Mayers 60 y o  male MRN: [id number]    Unit/Bed#: [facility] 507-01 Encounter: [id number]        Subjective:    No significant events overnight  NO chest pain  He complains of fatigue  ROS    Objective:   Vitals: Blood pressure 126/66, pulse 70, temperature 98 5 °F (36 9 °C), temperature source Axillary, resp  rate 22, height 5' 9" (1 753 m), weight (!) 166 kg (365 lb 8 4 oz), SpO2 99 %  , Body mass index is 53 98 kg/m² ,   Orthostatic Blood Pressures      Most Recent Value   Blood Pressure  126/66 filed at 02/10/2019 0805   Patient Position - Orthostatic VS  Lying filed at 02/10/2019 5961         Systolic (49JCI), HOF:732 , Min:117 , WVP:387     Diastolic (52HSQ), WLI:32, Min:61, Max:97      Intake/Output Summary (Last 24 hours) at 2/10/2019 0926  Last data filed at 2/10/2019 0805  Gross per 24 hour   Intake 600 ml   Output 0 ml   Net 600 ml     Weight (last 2 days)     Date/Time   Weight    02/10/19 0600   166 (365 52)  (Abnormal)     02/09/19 0537   167 (367 29)  (Abnormal)                 Telemetry Review: No significant arrhythmias seen on telemetry review  afib    Physical Exam   Constitutional: He is oriented to person, place, and time  No distress  HENT:   Mouth/Throat: No oropharyngeal exudate  Eyes: No scleral icterus  Neck: No JVD present  Cardiovascular: Normal rate and regular rhythm  No murmur heard  Pulmonary/Chest: Effort normal and breath sounds normal  No respiratory distress  He has no wheezes  He has no rales  Abdominal: Soft  Bowel sounds are normal  He exhibits no distension  There is no tenderness  Musculoskeletal: He exhibits edema  Neurological: He is alert and oriented to person, place, and time  No cranial nerve deficit  Skin: Skin is warm and dry  He is not diaphoretic           Laboratory Results:        CBC with diff:   Results from last 7 days   Lab Units 02/10/19  0456 02/09/19  3657 02/08/19  5082 02/07/19  0455 02/06/19  4236 02/05/19  0541 02/04/19  0514   WBC Thousand/uL 11 02* 11 60* 13 04* 9 37 11 06* 10 20* 15 64*   HEMOGLOBIN g/dL 7 5* 7 5* 7 8* 7 6* 7 8* 7 7* 8 2*   HEMATOCRIT % 24 7* 25 0* 25 8* 24 8* 24 6* 25 0* 26 0*   MCV fL 88 87 88 87 86 87 86   PLATELETS Thousands/uL 234 234 260 191 172 145* 140*   MCH pg 26 7* 26 1* 26 4* 26 7* 27 4 26 7* 27 1   MCHC g/dL 30 4* 30 0* 30 2* 30 6* 31 7 30 8* 31 5   RDW % 18 1* 17 9* 18 2* 18 0* 18 0* 18 0* 18 2*   MPV fL 12 0 11 9 11 8 12 8* 12 6 12 8* 12 2   NRBC AUTO /100 WBCs 0 0 0 0 0 0 0         CMP:  Results from last 7 days   Lab Units 02/09/19  1113 02/09/19  0548 02/08/19  0525 02/07/19  0455 02/06/19  0458 02/05/19  0541 02/04/19  0514   POTASSIUM mmol/L 4 2 4 2 4 6 4 4 4 8 4 6 4 7   CHLORIDE mmol/L 95* 95* 94* 99* 98* 100 99*   CO2 mmol/L 25 24 23 26 23 25 25   BUN mg/dL 82* 73* 87* 62* 79* 58* 56*   CREATININE mg/dL 5 66* 5 29* 5 84* 4 43* 5 33* 4 06* 3 94*   CALCIUM mg/dL 8 1* 8 0* 8 1* 7 7* 8 0* 7 8* 7 9*   AST U/L  --  23  --   --   --   --   --    ALT U/L  --  10*  --   --   --   --   --    ALK PHOS U/L  --  71  --   --   --   --   --    EGFR ml/min/1 73sq m 10 11 10 14 11 15 16         BMP:  Results from last 7 days   Lab Units 02/09/19  1113 02/09/19  0548 02/08/19  0525 02/07/19  0455 02/06/19  0458 02/05/19  0541 02/04/19  0514   POTASSIUM mmol/L 4 2 4 2 4 6 4 4 4 8 4 6 4 7   CHLORIDE mmol/L 95* 95* 94* 99* 98* 100 99*   CO2 mmol/L 25 24 23 26 23 25 25   BUN mg/dL 82* 73* 87* 62* 79* 58* 56*   CREATININE mg/dL 5 66* 5 29* 5 84* 4 43* 5 33* 4 06* 3 94*   CALCIUM mg/dL 8 1* 8 0* 8 1* 7 7* 8 0* 7 8* 7 9*       BNP: No results for input(s): BNP in the last 72 hours      Magnesium:   Results from last 7 days   Lab Units 02/10/19  0456 02/09/19  0548 02/08/19  0525 02/07/19  0455 02/06/19  0458 02/05/19  0541 02/04/19  0514   MAGNESIUM mg/dL 2 8* 2 6 2 8* 2 5 2 5 2 4 2 2       Coags:   Results from last 7 days   Lab Units 02/10/19  0456 02/09/19  0548 02/08/19  2248 19  1432 19  0945 19  0525 19  0455 19  0458 19  1128 19  0414   PTT seconds  --   --  79* 81*  --  110* 87* 78* 77* 73*   INR  3 61* 1 68*  --   --  1 34*  --   --   --   --   --        TSH: No results found for: TSH    Hemoglobin A1C       Lipid Profile:       Cardiac testing:   Results for orders placed during the hospital encounter of 19   Echo complete with contrast if indicated    Narrative [address], [place] [address]  [phone]    Transthoracic Echocardiogram  2D, Doppler, and Color Doppler    Study date:  2019    Patient: Alan Rogers  MR number: [id number]  Account number: [de-identified]  : 1960  Age: 60 years  Gender: Male  Status: Inpatient  Location: Bedside  Height: 72 in  Weight: 339 lb  BP: 89/ 54 mmHg    Indications: chest pain    Diagnoses: R07 9 - Chest pain, unspecified    Sonographer:  MADHU Stewart  Primary Physician:  Mildred Gomez DO  Referring Physician:  Aly Cuevas DO  Group:  [facility]  Interpreting Physician:  Daniel French DO    SUMMARY    PROCEDURE INFORMATION:  This was a technically difficult study despite the administration of echo contrast     LEFT VENTRICLE:  Systolic function was markedly reduced  Ejection fraction was estimated to be 30 %  There was severe diffuse hypokinesis with no obvious identifiable regional variations  Wall thickness was moderately increased  Concentric hypertrophy was present  VENTRICULAR SEPTUM:  There was dyssynergic motion  RIGHT VENTRICLE:  The ventricle was mildly dilated  Systolic function was moderately to markedly reduced  LEFT ATRIUM:  The atrium was mildly dilated  RIGHT ATRIUM:  The atrium was mildly dilated  AORTIC VALVE:  A bioprosthesis was present  It was not well visualized  Mean transvalvular gradient 13 mmHg      TRICUSPID VALVE:  There was mild regurgitation  PERICARDIUM:  A small, partially loculated pericardial effusion was identified along the left ventricular free wall  HISTORY: PRIOR HISTORY: Aortic valve prosthesis    PROCEDURE: The procedure was performed at the bedside  This was a routine study  The transthoracic approach was used  The study included complete 2D imaging, complete spectral Doppler, and color Doppler  The heart rate was 80 bpm, at the  start of the study  Intravenous contrast (Definity solution [1 3 ml Definity/8 7ml normal saline solution]) was administered  Intravenous contrast (Definity solution [1 3 ml Definity/8 7ml normal saline solution]) was administered to  opacify the left ventricle and enhance Doppler signals  Echocardiographic views were limited due to low windows and patient on mechanical ventilator  This was a technically difficult study despite the administration of echo contrast     LEFT VENTRICLE: Size was normal  Systolic function was markedly reduced  Ejection fraction was estimated to be 30 %  There was severe diffuse hypokinesis with no obvious identifiable regional variations  Wall thickness was moderately  increased  Concentric hypertrophy was present  DOPPLER: Normal sinus rhythm was absent  The study was not technically sufficient to allow evaluation of LV diastolic function  VENTRICULAR SEPTUM: There was dyssynergic motion  RIGHT VENTRICLE: The ventricle was mildly dilated  Systolic function was moderately to markedly reduced  LEFT ATRIUM: The atrium was mildly dilated  RIGHT ATRIUM: The atrium was mildly dilated  MITRAL VALVE: Not well visualized  DOPPLER: The transmitral velocity was within the normal range  There was no evidence for stenosis  There was no significant regurgitation  AORTIC VALVE: A bioprosthesis was present  It was not well visualized  Mean transvalvular gradient 13 mmHg  DOPPLER: There was no significant regurgitation      TRICUSPID VALVE: The valve structure was normal  There was normal leaflet separation  DOPPLER: The transtricuspid velocity was within the normal range  There was no evidence for stenosis  There was mild regurgitation  The tricuspid jet  envelope definition was inadequate for estimation of RV systolic pressure  PULMONIC VALVE: Leaflets exhibited normal thickness, no calcification, and normal cuspal separation  DOPPLER: The transpulmonic velocity was within the normal range  There was no significant regurgitation  PERICARDIUM: A small, partially loculated pericardial effusion was identified along the left ventricular free wall  The pericardium was normal in appearance  AORTA: The root exhibited normal size  SYSTEM MEASUREMENT TABLES    2D  %FS: 15 83 %  Ao Diam: 3 09 cm  EDV(Teich): 221 88 ml  EF(Teich): 32 54 %  ESV(Teich): 149 69 ml  IVSd: 1 59 cm  LA Diam: 5 18 cm  LVIDd: 6 58 cm  LVIDs: 5 54 cm  LVPWd: 1 43 cm  SV(Teich): 72 19 ml    CW  AV Env  Ti: 233 56 ms  AV VTI: 49 77 cm  AV Vmax: 2 64 m/s  AV Vmax: 2 67 m/s  AV Vmean: 2 13 m/s  AV maxP 95 mmHg  AV maxP 53 mmHg  AV meanP 73 mmHg    PW  LVOT Env  Ti: 215 22 ms  LVOT VTI: 11 41 cm  LVOT Vmax: 0 59 m/s  LVOT Vmax: 0 7 m/s  LVOT Vmean: 0 52 m/s  LVOT maxP 78 mmHg  LVOT maxP mmHg  LVOT meanP 24 mmHg  MV E Deep: 1 01 m/s    [facility] Accredited Echocardiography Laboratory    Prepared and electronically signed by    Daniel French DO  Signed 2019 10:14:55       Results for orders placed during the hospital encounter of 19   CHON    Narrative [address]  [address]  [phone]    Transesophageal Echocardiogram  2D, Doppler, and Color Doppler    Study date:  2019    Patient: Alan Rogers  MR number: [id number]  Account number: [de-identified]  : 1960  Age: 60 years  Gender: Male  Status: Inpatient  Location: Bedside  Height: 69 in  Weight: 319 lb  BP: 101/ 54 mmHg    Indications: Bacteremia  Diagnoses: R78 81 - Bacteremia    Sonographer:  Pankaj Orlando RDCS  Interpreting Physician:  Denise Velasco DO  Primary Physician:  Allyn Hackett DO  Referring Physician:  Kody Raines DO  Group:  [facility]  Cardiology Fellow:  Zayda Garzon MD    IMPRESSIONS:  There was no echocardiographic evidence for valvular vegetation  SUMMARY    LEFT VENTRICLE:  Systolic function was moderately reduced  Ejection fraction was estimated to be 40 %  There was moderate diffuse hypokinesis  Wall thickness was mildly increased  There was mild concentric hypertrophy  RIGHT VENTRICLE:  The size was normal   Systolic function was mildly reduced  LEFT ATRIUM:  The atrium was mildly dilated  ATRIAL SEPTUM:  There was a small patent foramen ovale with left to right shunt identified by color Doppler  RIGHT ATRIUM:  The atrium was mildly dilated  MITRAL VALVE:  There was trace regurgitation  There was no echocardiographic evidence of vegetation  AORTIC VALVE:  A bioprosthesis was present  It exhibited normal function  There was no echocardiographic evidence of vegetation  TRICUSPID VALVE:  There was mild regurgitation  There was no echocardiographic evidence of vegetation  HISTORY: PRIOR HISTORY: Aortic valve replacement, NSTEMI, Cardiomyopathy, Acute kidney injury  PROCEDURE: The procedure was performed at the bedside  This was a routine study  The risks and alternatives of the procedure were explained to the patient's next of kin and informed consent was obtained  The transesophageal approach was  used  The study included complete 2D imaging, complete spectral Doppler, and color Doppler  The heart rate was 113 bpm, at the start of the study  An adult omniplane probe was inserted by the cardiology fellow under direct supervision of  the attending cardiologist  Intubated with ease  One intubation attempt(s)   There was no blood detected on the probe  Image quality was adequate  There were no complications during the procedure  MEDICATIONS: Sedation administered by  bedside nurse  LEFT VENTRICLE: Size was normal  Systolic function was moderately reduced  Ejection fraction was estimated to be 40 %  There was moderate diffuse hypokinesis  Wall thickness was mildly increased  There was mild concentric hypertrophy  DOPPLER: The study was not technically sufficient to allow evaluation of LV diastolic function  RIGHT VENTRICLE: The size was normal  Systolic function was mildly reduced  Wall thickness was normal     LEFT ATRIUM: The atrium was mildly dilated  No thrombus was identified  APPENDAGE: The appendage was small  No thrombus was identified  DOPPLER: The function was normal (normal emptying velocity)  ATRIAL SEPTUM: There was a small patent foramen ovale with left to right shunt identified by color Doppler  RIGHT ATRIUM: The atrium was mildly dilated  No thrombus was identified  MITRAL VALVE: Valve structure was normal  There was normal leaflet separation  There was no echocardiographic evidence of vegetation  DOPPLER: There was trace regurgitation  AORTIC VALVE: A bioprosthesis was present  It exhibited normal function  There was no echocardiographic evidence of vegetation  TRICUSPID VALVE: The valve structure was normal  There was normal leaflet separation  There was no echocardiographic evidence of vegetation  DOPPLER: There was mild regurgitation  PULMONIC VALVE: Leaflets exhibited normal thickness, no calcification, and normal cuspal separation  There was no echocardiographic evidence of vegetation  DOPPLER: There was no significant regurgitation  PERICARDIUM: There was no pericardial effusion seen in the images obtained  AORTA: The root exhibited normal size  There was no atheroma  There was no evidence for dissection  There was no evidence for aneurysm      [facility]    Prepared and electronically signed by    Nargis Barillas   Signed 25-Jan-2019 14:01:14       No results found for this or any previous visit  No results found for this or any previous visit      Meds/Allergies   current meds:   Current Facility-Administered Medications   Medication Dose Route Frequency    acetaminophen (TYLENOL) tablet 650 mg  650 mg Oral Q6H PRN    amiodarone tablet 200 mg  200 mg Oral Daily With Breakfast    bisacodyl (DULCOLAX) rectal suppository 10 mg  10 mg Rectal Daily PRN    calcium acetate (PHOSLO) capsule 1,334 mg  1,334 mg Oral BID    ceFAZolin (ANCEF) 1 g in sodium chloride 0 9% 50 ml IVPB  1,000 mg Intravenous Q24H    guaiFENesin (MUCINEX) 12 hr tablet 600 mg  600 mg Oral Q12H ARACELIS    HYDROmorphone (DILAUDID) injection 1 mg  1 mg Intravenous Q3H PRN    ipratropium (ATROVENT) 0 02 % inhalation solution 0 5 mg  0 5 mg Nebulization Q6H PRN    levalbuterol (XOPENEX) inhalation solution 1 25 mg  1 25 mg Nebulization Q6H PRN    metoprolol (LOPRESSOR) injection 5 mg  5 mg Intravenous Q6H PRN    metoprolol tartrate (LOPRESSOR) tablet 75 mg  75 mg Oral Q12H ARACELIS    nystatin (MYCOSTATIN) powder   Topical BID    oxyCODONE (ROXICODONE) immediate release tablet 10 mg  10 mg Oral Q4H PRN    polyethylene glycol (MIRALAX) packet 17 g  17 g Oral Daily    polyvinyl alcohol (LIQUIFILM TEARS) 1 4 % ophthalmic solution 1 drop  1 drop Both Eyes Q3H PRN    QUEtiapine (SEROquel) tablet 25 mg  25 mg Oral HS    senna (SENOKOT) tablet 8 6 mg  1 tablet Oral HS     Medications Prior to Admission   Medication    amLODIPine (NORVASC) 5 mg tablet    ascorbic acid (VITAMIN C) 500 MG tablet    aspirin (ECOTRIN) 325 mg EC tablet    fluticasone (FLONASE) 50 mcg/act nasal spray    loratadine (CLARITIN) 10 mg tablet    metoprolol tartrate (LOPRESSOR) 100 mg tablet    potassium chloride (K-DUR,KLOR-CON) 20 mEq tablet    pravastatin (PRAVACHOL) 40 mg tablet    torsemide (DEMADEX) 20 mg tablet          Assessment:  Active Problems:    Increased BMI    NSTEMI (non-ST elevated myocardial infarction) ([facility] [address] )    ESRD (end stage renal disease) ([facility])    Stress-induced cardiomyopathy    Acute respiratory failure with hypoxia (HCC)    History of aortic valve replacement with bioprosthetic valve    Atrial fibrillation (HCC)    Staphylococcus aureus bacteremia    Transaminitis    Thrombocytopenia (HCC)    Anemia    Leukocytosis    Plan:    Chronic Systolic CHF / ESRD on HD / Atrial fibrillation     On anticoagulation with warfarin  INR 3 6  Hold tonights coumadin  Currently on metoprolol tartrate for rate control and on amiodarone  Counseling / Coordination of Care  Total floor / unit time spent today 25 minutes  Greater than 50% of total time was spent with the patient and / or family counseling and / or coordination of care  A description of the counseling / coordination of care

## 2019-02-10 NOTE — PROGRESS NOTES
NEPHROLOGY PROGRESS NOTE    Rachid Shore 60 y o  male MRN: [id number]  Unit/Bed#: [facility] 507-01 Encounter: [id number]  Reason for Consult:  Acute renal failure    Patient relatively stable with no acute complaints  Gets emotional when I talk to him but other than that no complaints of shortness of breath  ASSESSMENT/PLAN:  1  Renal  Patient developed acute renal failure and ATN related to shock from cardiogenic shock  Patient is presently dialysis dependent next dialysis is   Hemodialysis   Reduce dry weight as tolerated    2  Cardiac  Cardiology following  History of cardiomyopathy history of aortic valve replacement as well as ischemic cardiomyopathy and MI recently  3  MSSA bacteremia  Treated with cefazolin  Surveillance cultures have been negative  SUBJECTIVE:  Review of Systems   Constitution: Negative for chills  HENT: Negative  Eyes: Negative  Cardiovascular: Negative for chest pain and orthopnea  Respiratory: Negative for cough and shortness of breath  Gastrointestinal: Negative  OBJECTIVE:  Current Weight: Weight - Scale: (!) 166 kg (365 lb 8 4 oz)  Vitals:Temp (24hrs), Av 2 °F (36 8 °C), Min:97 6 °F (36 4 °C), Max:98 6 °F (37 °C)  Current: Temperature: 97 8 °F (36 6 °C)   Blood pressure 131/64, pulse 78, temperature 97 8 °F (36 6 °C), resp  rate 16, height 5' 9" (1 753 m), weight (!) 166 kg (365 lb 8 4 oz), SpO2 96 %  , Body mass index is 53 98 kg/m²  Intake/Output Summary (Last 24 hours) at 2/10/2019 1327  Last data filed at 2/10/2019 1216  Gross per 24 hour   Intake 600 ml   Output 0 ml   Net 600 ml       Physical Exam: /64   Pulse 78   Temp 97 8 °F (36 6 °C)   Resp 16   Ht 5' 9" (1 753 m)   Wt (!) 166 kg (365 lb 8 4 oz)   SpO2 96%   BMI 53 98 kg/m²   Physical Exam   Constitutional: No distress  Neck: No JVD present  Cardiovascular: Normal rate  Exam reveals no friction rub     Pulmonary/Chest: Effort normal and breath sounds normal  No respiratory distress  He has no rales  Abdominal: Soft  Bowel sounds are normal  He exhibits no distension         Medications:    Current Facility-Administered Medications:     acetaminophen (TYLENOL) tablet 650 mg, 650 mg, Oral, Q6H PRN, Patience Barney MD, 650 mg at 02/07/19 1149    amiodarone tablet 200 mg, 200 mg, Oral, Daily With Breakfast, Anil Prescott MD, 200 mg at 02/10/19 0804    bisacodyl (DULCOLAX) rectal suppository 10 mg, 10 mg, Rectal, Daily PRN, Parvin Marquez PA-C    calcium acetate (PHOSLO) capsule 1,334 mg, 1,334 mg, Oral, BID, Edel Suresh MD, 1,334 mg at 02/10/19 0804    ceFAZolin (ANCEF) 1 g in sodium chloride 0 9% 50 ml IVPB, 1,000 mg, Intravenous, Q24H, Patience Barney MD, Last Rate: 100 mL/hr at 02/09/19 1703, 1,000 mg at 02/09/19 1703    guaiFENesin (MUCINEX) 12 hr tablet 600 mg, 600 mg, Oral, Q12H [facility], Olive Umanzor PA-C, 600 mg at 02/10/19 0804    HYDROmorphone (DILAUDID) injection 1 mg, 1 mg, Intravenous, Q3H PRN, Lidya Martinez, 1 mg at 02/04/19 1819    ipratropium (ATROVENT) 0 02 % inhalation solution 0 5 mg, 0 5 mg, Nebulization, Q6H PRN, Sharon Douglas MD    levalbuterol [facility]) inhalation solution 1 25 mg, 1 25 mg, Nebulization, Q6H PRN, Sharon Douglas MD    metoprolol (LOPRESSOR) injection 5 mg, 5 mg, Intravenous, Q6H PRN, Savannah Oro DO, 5 mg at 02/08/19 1706    metoprolol tartrate (LOPRESSOR) tablet 75 mg, 75 mg, Oral, Q12H [facility], Anil Prescott MD, 75 mg at 02/10/19 0804    nystatin (MYCOSTATIN) powder, , Topical, BID, Parvni Marquez PA-C    oxyCODONE (ROXICODONE) immediate release tablet 10 mg, 10 mg, Oral, Q4H PRN, Patience Barney MD, 10 mg at 02/10/19 1017    polyethylene glycol (MIRALAX) packet 17 g, 17 g, Oral, Daily, KATHY Mathias, 17 g at 02/10/19 0806    polyvinyl alcohol (LIQUIFILM TEARS) 1 4 % ophthalmic solution 1 drop, 1 drop, Both Eyes, Q3H PRN, Carlotta Ballesteros HERBERT Parrish, 1 drop at 02/07/19 0813    QUEtiapine (SEROquel) tablet 25 mg, 25 mg, Oral, HS, Jonathan Elder PA-C, 25 mg at 02/09/19 2105    senna (SENOKOT) tablet 8 6 mg, 1 tablet, Oral, HS, Madelin Opitz, MD, 8 6 mg at 02/09/19 2104    Laboratory Results:  Lab Results   Component Value Date    WBC 11 02 (H) 02/10/2019    HGB 7 5 (L) 02/10/2019    HCT 24 7 (L) 02/10/2019    MCV 88 02/10/2019     02/10/2019     Lab Results   Component Value Date    GLUCOSE 92 04/09/2015    CALCIUM 8 1 (L) 02/09/2019     04/09/2015    K 4 2 02/09/2019    CO2 25 02/09/2019    CL 95 (L) 02/09/2019    BUN 82 (H) 02/09/2019    CREATININE 5 66 (H) 02/09/2019     Lab Results   Component Value Date    CALCIUM 8 1 (L) 02/09/2019    PHOS 8 9 (H) 02/10/2019     No results found for: LABPROT

## 2019-02-10 NOTE — QUICK NOTE
ADDENDUM:    Result of CT of head as follows: "Interval development of subtle areas of decreased attenuation along the gray matter white matter interface in the bilateral parieto-occipital regions  Differential considerations include infectious etiologies such as abscess or septic emboli, metastatic disease or ischemia  No intracranial hemorrhage or significant mass effect  Further characterization with brain MRI should be considered "    Neurology input to be appreciated

## 2019-02-10 NOTE — PLAN OF CARE
Problem: Potential for Falls  Goal: Patient will remain free of falls  Description  INTERVENTIONS:  - Assess patient frequently for physical needs  -  Identify cognitive and physical deficits and behaviors that affect risk of falls  -  [place] fall precautions as indicated by assessment   - Educate patient/family on patient safety including physical limitations  - Instruct patient to call for assistance with activity based on assessment  - Modify environment to reduce risk of injury  - Consider OT/PT consult to assist with strengthening/mobility    Outcome: Progressing     Problem: Prexisting or High Potential for Compromised Skin Integrity  Goal: Skin integrity is maintained or improved  Description  INTERVENTIONS:  - Identify patients at risk for skin breakdown  - Assess and monitor skin integrity  - Assess and monitor nutrition and hydration status  - Monitor labs (i e  albumin)  - Assess for incontinence   - Turn and reposition patient  - Assist with mobility/ambulation  - Relieve pressure over bony prominences  - Avoid friction and shearing  - Provide appropriate hygiene as needed including keeping skin clean and dry  - Evaluate need for skin moisturizer/barrier cream  - Collaborate with interdisciplinary team (i e  Nutrition, Rehabilitation, etc )   - Patient/family teaching   Outcome: Progressing     Problem: Nutrition/Hydration-ADULT  Goal: Nutrient/Hydration intake appropriate for improving, restoring or maintaining nutritional needs  Description  Monitor and assess patient's nutrition/hydration status for malnutrition (ex- brittle hair, bruises, dry skin, pale skin and conjunctiva, muscle wasting, smooth red tongue, and disorientation)  Collaborate with interdisciplinary team and initiate plan and interventions as ordered  Monitor patient's weight and dietary intake as ordered or per policy  Utilize nutrition screening tool and intervene per policy   Determine patient's food preferences and provide high-protein, high-caloric foods as appropriate  INTERVENTIONS:  - Monitor oral intake, urinary output, labs, and treatment plans  - Assess nutrition and hydration status and recommend course of action  - Evaluate amount of meals eaten  - Assist patient with eating if necessary   - Allow adequate time for meals  - Recommend/ encourage appropriate diets, oral nutritional supplements, and vitamin/mineral supplements  - Order, calculate, and assess calorie counts as needed  - Recommend, monitor, and adjust tube feedings and TPN/PPN based on assessed needs  - Assess need for intravenous fluids  - Provide specific nutrition/hydration education as appropriate  - Include patient/family/caregiver in decisions related to nutrition   Outcome: Progressing     Problem: CARDIOVASCULAR - ADULT  Goal: Maintains optimal cardiac output and hemodynamic stability  Description  INTERVENTIONS:  - Monitor I/O, vital signs and rhythm  - Monitor for S/S and trends of decreased cardiac output i e  bleeding, hypotension  - Administer and titrate ordered vasoactive medications to optimize hemodynamic stability  - Assess quality of pulses, skin color and temperature  - Assess for signs of decreased coronary artery perfusion - ex   Angina  - Instruct patient to report change in severity of symptoms   Outcome: Progressing  Goal: Absence of cardiac dysrhythmias or at baseline rhythm  Description  INTERVENTIONS:  - Continuous cardiac monitoring, monitor vital signs, obtain 12 lead EKG if indicated  - Administer antiarrhythmic and heart rate control medications as ordered  - Monitor electrolytes and administer replacement therapy as ordered   Outcome: Progressing     Problem: METABOLIC, FLUID AND ELECTROLYTES - ADULT  Goal: Electrolytes maintained within normal limits  Description  INTERVENTIONS:  - Monitor labs and assess patient for signs and symptoms of electrolyte imbalances  - Administer electrolyte replacement as ordered  - Monitor response to electrolyte replacements, including repeat lab results as appropriate  - Instruct patient on fluid and nutrition as appropriate   Outcome: Progressing  Goal: Fluid balance maintained  Description  INTERVENTIONS:  - Monitor labs and assess for signs and symptoms of volume excess or deficit  - Monitor I/O and WT  - Instruct patient on fluid and nutrition as appropriate   Outcome: Progressing     Problem: PAIN - ADULT  Goal: Verbalizes/displays adequate comfort level or baseline comfort level  Description  Interventions:  - Encourage patient to monitor pain and request assistance  - Assess pain using appropriate pain scale  - Administer analgesics based on type and severity of pain and evaluate response  - Implement non-pharmacological measures as appropriate and evaluate response  - Consider cultural and social influences on pain and pain management  - Notify physician/advanced practitioner if interventions unsuccessful or patient reports new pain   Outcome: Progressing     Problem: INFECTION - ADULT  Goal: Absence or prevention of progression during hospitalization  Description  INTERVENTIONS:  - Assess and monitor for signs and symptoms of infection  - Monitor lab/diagnostic results  - Monitor all insertion sites, i e  indwelling lines, tubes, and drains  - Monitor endotracheal (as able) and nasal secretions for changes in amount and color  - Malcolm appropriate cooling/warming therapies per order  - Administer medications as ordered  - Instruct and encourage patient and family to use good hand hygiene technique  - Identify and instruct in appropriate isolation precautions for identified infection/condition    Outcome: Progressing     Problem: SAFETY ADULT  Goal: Patient will remain free of falls  Description  INTERVENTIONS:  - Assess patient frequently for physical needs  -  Identify cognitive and physical deficits and behaviors that affect risk of falls    -  [place] fall precautions as indicated by assessment   - Educate patient/family on patient safety including physical limitations  - Instruct patient to call for assistance with activity based on assessment  - Modify environment to reduce risk of injury  - Consider OT/PT consult to assist with strengthening/mobility    Outcome: Progressing  Goal: Maintain or return to baseline ADL function  Description  INTERVENTIONS:  -  Assess patient's ability to carry out ADLs; assess patient's baseline for ADL function and identify physical deficits which impact ability to perform ADLs (bathing, care of mouth/teeth, toileting, grooming, dressing, etc )  - Assess/evaluate cause of self-care deficits   - Assess range of motion  - Assess patient's mobility; develop plan if impaired  - Assess patient's need for assistive devices and provide as appropriate  - Encourage maximum independence but intervene and supervise when necessary  ¯ Involve family in performance of ADLs  ¯ Assess for home care needs following discharge   ¯ Request OT consult to assist with ADL evaluation and planning for discharge  ¯ Provide patient education as appropriate    Outcome: Progressing  Goal: Maintain or return mobility status to optimal level  Description  INTERVENTIONS:  - Assess patient's baseline mobility status (ambulation, transfers, stairs, etc )    - Identify cognitive and physical deficits and behaviors that affect mobility  - Identify mobility aids required to assist with transfers and/or ambulation (gait belt, sit-to-stand, lift, walker, cane, etc )  - [place] fall precautions as indicated by assessment  - Record patient progress and toleration of activity level on Mobility SBAR; progress patient to next Phase/Stage  - Instruct patient to call for assistance with activity based on assessment  - Request Rehabilitation consult to assist with strengthening/weightbearing, etc     Outcome: Progressing     Problem: DISCHARGE PLANNING  Goal: Discharge to home or other facility with appropriate resources  Description  INTERVENTIONS:  - Identify barriers to discharge w/patient and caregiver  - Arrange for needed discharge resources and transportation as appropriate  - Identify discharge learning needs (meds, wound care, etc )  - Arrange for interpretive services to assist at discharge as needed  - Refer to Case Management Department for coordinating discharge planning if the patient needs post-hospital services based on physician/advanced practitioner order or complex needs related to functional status, cognitive ability, or social support system   Outcome: Progressing     Problem: Knowledge Deficit  Goal: Patient/family/caregiver demonstrates understanding of disease process, treatment plan, medications, and discharge instructions  Description  Complete learning assessment and assess knowledge base    Interventions:  - Provide teaching at level of understanding  - Provide teaching via preferred learning methods   Outcome: Progressing     Problem: GENITOURINARY - ADULT  Goal: Maintains or returns to baseline urinary function  Description  INTERVENTIONS:  - Assess urinary function  - Encourage oral fluids to ensure adequate hydration  - Administer IV fluids as ordered to ensure adequate hydration  - Administer ordered medications as needed  - Offer frequent toileting  - Follow urinary retention protocol if ordered   Outcome: Progressing  Goal: Absence of urinary retention  Description  INTERVENTIONS:  - Assess patient?s ability to void and empty bladder  - Monitor I/O  - Bladder scan as needed  - Discuss with physician/AP medications to alleviate retention as needed  - Discuss catheterization for long term situations as appropriate   Outcome: Progressing     Problem: DISCHARGE PLANNING - CARE MANAGEMENT  Goal: Discharge to post-acute care or home with appropriate resources  Description  INTERVENTIONS:  - Conduct assessment to determine patient/family and health care team treatment goals, and need for post-acute services based on payer coverage, community resources, and patient preferences, and barriers to discharge  - Address psychosocial, clinical, and financial barriers to discharge as identified in assessment in conjunction with the patient/family and health care team  - Arrange appropriate level of post-acute services according to patient's   needs and preference and payer coverage in collaboration with the physician and health care team  - Communicate with and update the patient/family, physician, and health care team regarding progress on the discharge plan  - Arrange appropriate transportation to post-acute venues  - Pt to d/c with appropriate resources when medically stable     Outcome: Progressing

## 2019-02-11 ENCOUNTER — APPOINTMENT (INPATIENT)
Dept: DIALYSIS | Facility: HOSPITAL | Age: 59
DRG: 871 | End: 2019-02-11
Payer: COMMERCIAL

## 2019-02-11 LAB
ANION GAP SERPL CALCULATED.3IONS-SCNC: 14 MMOL/L (ref 4–13)
BASOPHILS # BLD AUTO: 0.02 THOUSANDS/ΜL (ref 0–0.1)
BASOPHILS NFR BLD AUTO: 0 % (ref 0–1)
BUN SERPL-MCNC: 118 MG/DL (ref 5–25)
CALCIUM SERPL-MCNC: 7.9 MG/DL (ref 8.3–10.1)
CHLORIDE SERPL-SCNC: 95 MMOL/L (ref 100–108)
CO2 SERPL-SCNC: 22 MMOL/L (ref 21–32)
CREAT SERPL-MCNC: 7.87 MG/DL (ref 0.6–1.3)
EOSINOPHIL # BLD AUTO: 0.06 THOUSAND/ΜL (ref 0–0.61)
EOSINOPHIL NFR BLD AUTO: 1 % (ref 0–6)
ERYTHROCYTE [DISTWIDTH] IN BLOOD BY AUTOMATED COUNT: 17.6 % (ref 11.6–15.1)
GFR SERPL CREATININE-BSD FRML MDRD: 7 ML/MIN/1.73SQ M
GLUCOSE SERPL-MCNC: 93 MG/DL (ref 65–140)
HCT VFR BLD AUTO: 24.7 % (ref 36.5–49.3)
HGB BLD-MCNC: 7.7 G/DL (ref 12–17)
IMM GRANULOCYTES # BLD AUTO: 0.15 THOUSAND/UL (ref 0–0.2)
IMM GRANULOCYTES NFR BLD AUTO: 1 % (ref 0–2)
INR PPP: 5.71 (ref 0.86–1.17)
LYMPHOCYTES # BLD AUTO: 0.41 THOUSANDS/ΜL (ref 0.6–4.47)
LYMPHOCYTES NFR BLD AUTO: 4 % (ref 14–44)
MAGNESIUM SERPL-MCNC: 2.8 MG/DL (ref 1.6–2.6)
MCH RBC QN AUTO: 27.2 PG (ref 26.8–34.3)
MCHC RBC AUTO-ENTMCNC: 31.2 G/DL (ref 31.4–37.4)
MCV RBC AUTO: 87 FL (ref 82–98)
MONOCYTES # BLD AUTO: 0.95 THOUSAND/ΜL (ref 0.17–1.22)
MONOCYTES NFR BLD AUTO: 8 % (ref 4–12)
NEUTROPHILS # BLD AUTO: 9.83 THOUSANDS/ΜL (ref 1.85–7.62)
NEUTS SEG NFR BLD AUTO: 86 % (ref 43–75)
NRBC BLD AUTO-RTO: 0 /100 WBCS
PHOSPHATE SERPL-MCNC: 10.6 MG/DL (ref 2.7–4.5)
PLATELET # BLD AUTO: 215 THOUSANDS/UL (ref 149–390)
PMV BLD AUTO: 11.6 FL (ref 8.9–12.7)
POTASSIUM SERPL-SCNC: 5.1 MMOL/L (ref 3.5–5.3)
PROTHROMBIN TIME: 51.3 SECONDS (ref 11.8–14.2)
RBC # BLD AUTO: 2.83 MILLION/UL (ref 3.88–5.62)
SODIUM SERPL-SCNC: 131 MMOL/L (ref 136–145)
WBC # BLD AUTO: 11.42 THOUSAND/UL (ref 4.31–10.16)

## 2019-02-11 PROCEDURE — 85610 PROTHROMBIN TIME: CPT | Performed by: INTERNAL MEDICINE

## 2019-02-11 PROCEDURE — 80048 BASIC METABOLIC PNL TOTAL CA: CPT | Performed by: INTERNAL MEDICINE

## 2019-02-11 PROCEDURE — 85025 COMPLETE CBC W/AUTO DIFF WBC: CPT | Performed by: INTERNAL MEDICINE

## 2019-02-11 PROCEDURE — 99232 SBSQ HOSP IP/OBS MODERATE 35: CPT | Performed by: INTERNAL MEDICINE

## 2019-02-11 PROCEDURE — 99233 SBSQ HOSP IP/OBS HIGH 50: CPT | Performed by: INTERNAL MEDICINE

## 2019-02-11 PROCEDURE — 83735 ASSAY OF MAGNESIUM: CPT | Performed by: INTERNAL MEDICINE

## 2019-02-11 PROCEDURE — 90935 HEMODIALYSIS ONE EVALUATION: CPT | Performed by: INTERNAL MEDICINE

## 2019-02-11 PROCEDURE — 84100 ASSAY OF PHOSPHORUS: CPT | Performed by: INTERNAL MEDICINE

## 2019-02-11 PROCEDURE — 99254 IP/OBS CNSLTJ NEW/EST MOD 60: CPT | Performed by: PHYSICIAN ASSISTANT

## 2019-02-11 RX ADMIN — NYSTATIN: 100000 POWDER TOPICAL at 17:06

## 2019-02-11 RX ADMIN — AMIODARONE HYDROCHLORIDE 200 MG: 200 TABLET ORAL at 13:48

## 2019-02-11 RX ADMIN — GUAIFENESIN 600 MG: 600 TABLET, EXTENDED RELEASE ORAL at 21:24

## 2019-02-11 RX ADMIN — CALCIUM ACETATE 1334 MG: 667 CAPSULE ORAL at 17:06

## 2019-02-11 RX ADMIN — NYSTATIN: 100000 POWDER TOPICAL at 08:00

## 2019-02-11 RX ADMIN — CEFAZOLIN SODIUM 1000 MG: 10 INJECTION, POWDER, FOR SOLUTION INTRAVENOUS at 17:06

## 2019-02-11 RX ADMIN — QUETIAPINE FUMARATE 25 MG: 25 TABLET ORAL at 21:24

## 2019-02-11 RX ADMIN — CALCIUM ACETATE 1334 MG: 667 CAPSULE ORAL at 13:47

## 2019-02-11 RX ADMIN — STANDARDIZED SENNA CONCENTRATE 8.6 MG: 8.6 TABLET ORAL at 21:24

## 2019-02-11 RX ADMIN — OXYCODONE HYDROCHLORIDE 10 MG: 10 TABLET ORAL at 21:32

## 2019-02-11 RX ADMIN — METOPROLOL TARTRATE 75 MG: 50 TABLET, FILM COATED ORAL at 21:24

## 2019-02-11 RX ADMIN — HYDROMORPHONE HYDROCHLORIDE 1 MG: 1 INJECTION, SOLUTION INTRAMUSCULAR; INTRAVENOUS; SUBCUTANEOUS at 07:47

## 2019-02-11 RX ADMIN — METOPROLOL TARTRATE 75 MG: 50 TABLET, FILM COATED ORAL at 13:47

## 2019-02-11 RX ADMIN — METOPROLOL TARTRATE 5 MG: 5 INJECTION, SOLUTION INTRAVENOUS at 15:59

## 2019-02-11 RX ADMIN — GUAIFENESIN 600 MG: 600 TABLET, EXTENDED RELEASE ORAL at 13:50

## 2019-02-11 RX ADMIN — HYDROMORPHONE HYDROCHLORIDE 1 MG: 1 INJECTION, SOLUTION INTRAMUSCULAR; INTRAVENOUS; SUBCUTANEOUS at 16:00

## 2019-02-11 NOTE — OCCUPATIONAL THERAPY NOTE
Occupational Therapy Cancellation Note        Patient Name: Caleb WHEATXATJessiJ Date: 2/11/2019    Chart reviewed  Attempted to see pt  Pt off the floor at dialysis  OT will continue to follow to see as able      Ynes Moffett MS, OTR/L

## 2019-02-11 NOTE — PROGRESS NOTES
Heart Failure Service Progress Note - Georges Recio 60 y o  male MRN: [id number]    Unit/Bed#: [facility] 507-01 Encounter: [id number]      Assessment:    Active Problems:    Increased BMI    NSTEMI (non-ST elevated myocardial infarction) ([address] )    ESRD (end stage renal disease) (HCC)    Stress-induced cardiomyopathy    Acute respiratory failure with hypoxia (HCC)    History of aortic valve replacement with bioprosthetic valve    Atrial fibrillation (HCC)    Staphylococcus aureus bacteremia    Transaminitis    Thrombocytopenia (HCC)    Anemia    Leukocytosis    Plan:  --Continue to hold milrinone  --Keep CI>2 2  --Continue heparin gtt  --Continue PO amiodarone  --Uptitrate metoprolol tartrate as tolerated; then transition to succinate prior to D/C  --Hep gtt bridge to coumadin     # Mixed shock: Primarily driven by sepsis w/ cardiogenic component  Now resolved  MvO2 60% from SVC (likely underestimating MvO2 given location) -> correlates to CO/CI of 6 6/2 3  Actual CO/CI likely slightly higher      # BiV HFrEF, LVEF 30%, LVIDd 6 5 cm: TTE shows LVEF 25-30%, cLVH, dilated RV with reduced RVSF  BioAVR with normal function  ? myocardial depression from sepsis  Unclear at this time  Once clinically improved, would consider repeat TTE  If continues to be low, then will consider further workup for alternative etiologies at that time  --Plan as above     # Bio AVR: CHON without vegetation  # NSTEMI type II  # New onset AFib on long term AC for CVA ppx: amiodarone as above  # PATRICK on HD  # Shock liver: Resolved    Subjective:   Patient seen and examined  No significant events overnight  Objective:       [employer] (day, reason): Turner catheter (day, reason):    Vitals: Blood pressure 140/90, pulse 102, temperature 98 1 °F (36 7 °C), temperature source Oral, resp  rate 18, height 5' 9" (1 753 m), weight (!) 140 kg (309 lb 8 4 oz), SpO2 96 %  , Body mass index is 45 71 kg/m² , I/O last 3 completed shifts:   In: 56 [P O :440; IV Piggyback:50]  Out: 0   I/O this shift: In: 760 [P O :460; I V :300]  Out: 3300 [Other:3300]  Wt Readings from Last 3 Encounters:   02/11/19 (!) 140 kg (309 lb 8 4 oz)   01/24/19 (!) 154 kg (339 lb 8 1 oz)   06/11/18 (!) 155 kg (341 lb)       Intake/Output Summary (Last 24 hours) at 2/11/2019 1653  Last data filed at 2/11/2019 1300  Gross per 24 hour   Intake 1050 ml   Output 3300 ml   Net -2250 ml     I/O last 3 completed shifts: In: 56 [P O :440; IV Piggyback:50]  Out: 0     No significant arrhythmias seen on telemetry review         Physical Exam:  Vitals:    02/11/19 1300 02/11/19 1347 02/11/19 1349 02/11/19 1525   BP: 143/73 138/91 138/91 140/90   BP Location: Right arm  Left arm Left arm   Pulse: 57 (!) 129 (!) 145 102   Resp:   (!) 23 18   Temp:   98 2 °F (36 8 °C) 98 1 °F (36 7 °C)   TempSrc:   Oral Oral   SpO2:   96% 96%   Weight:       Height:           GEN: Wes Stallings appears well, alert and oriented x 3, pleasant and cooperative   HEENT: pupils equal, round, and reactive to light; extraocular muscles intact  NECK: supple, no carotid bruits   HEART: regular rhythm, normal S1 and S2, no murmurs, clicks, gallops or rubs, JVP is    LUNGS: clear to auscultation bilaterally; no wheezes, rales, or rhonchi   ABDOMEN: normal bowel sounds, soft, no tenderness, no distention  EXTREMITIES: peripheral pulses normal; no clubbing, cyanosis, or edema  NEURO: no focal findings   SKIN: normal without suspicious lesions on exposed skin      Current Facility-Administered Medications:     acetaminophen (TYLENOL) tablet 650 mg, 650 mg, Oral, Q6H PRN, Tonya Luna MD, 650 mg at 02/07/19 1149    amiodarone tablet 200 mg, 200 mg, Oral, Daily With Breakfast, Romaine Lind MD, 200 mg at 02/11/19 1348    bisacodyl (DULCOLAX) rectal suppository 10 mg, 10 mg, Rectal, Daily PRN, J Luis Blackman PA-C    calcium acetate (PHOSLO) capsule 1,334 mg, 1,334 mg, Oral, BID, Adali Allen MD, 1,334 mg at 02/11/19 1347    ceFAZolin (ANCEF) 1 g in sodium chloride 0 9% 50 ml IVPB, 1,000 mg, Intravenous, Q24H, Anna Mai MD, Stopped at 02/10/19 1901    guaiFENesin (MUCINEX) 12 hr tablet 600 mg, 600 mg, Oral, Q12H [address], Olive Umanzor PA-C, 600 mg at 02/11/19 1350    HYDROmorphone (DILAUDID) injection 1 mg, 1 mg, Intravenous, Q3H PRN, Lidya ElsAdventHealth Manchesterkh, 1 mg at 02/11/19 1600    ipratropium (ATROVENT) 0 02 % inhalation solution 0 5 mg, 0 5 mg, Nebulization, Q6H PRN, Gaby Gold MD    levalbuterol (XOPENEX) inhalation solution 1 25 mg, 1 25 mg, Nebulization, Q6H PRN, Gaby Gold MD    metoprolol (LOPRESSOR) injection 5 mg, 5 mg, Intravenous, Q6H PRN, Savannah Oro, , 5 mg at 02/11/19 1559    metoprolol tartrate (LOPRESSOR) tablet 75 mg, 75 mg, Oral, Q12H [address], Lesa Cantu MD, 75 mg at 02/11/19 1347    nystatin (MYCOSTATIN) powder, , Topical, BID, Wilhemenia Spatz, PA-C    oxyCODONE (ROXICODONE) immediate release tablet 10 mg, 10 mg, Oral, Q4H PRN, Anna Mai MD, 10 mg at 02/10/19 1936    polyethylene glycol (MIRALAX) packet 17 g, 17 g, Oral, Daily, KATHY Mathias, 17 g at 02/10/19 0806    polyvinyl alcohol (LIQUIFILM TEARS) 1 4 % ophthalmic solution 1 drop, 1 drop, Both Eyes, Q3H PRN, Lara Barron PA-C, 1 drop at 02/07/19 0813    QUEtiapine (SEROquel) tablet 25 mg, 25 mg, Oral, HS, Lara Barron PA-C, 25 mg at 02/10/19 2140    senna (SENOKOT) tablet 8 6 mg, 1 tablet, Oral, HS, Anna Mai MD, 8 6 mg at 02/10/19 2140      Labs & Results:        Results from last 7 days   Lab Units 02/11/19  0525 02/10/19  0456 02/09/19  0548   WBC Thousand/uL 11 42* 11 02* 11 60*   HEMOGLOBIN g/dL 7 7* 7 5* 7 5*   HEMATOCRIT % 24 7* 24 7* 25 0*   PLATELETS Thousands/uL 215 234 234         Results from last 7 days   Lab Units 02/11/19  0525 02/09/19  1113 02/09/19  0548   POTASSIUM mmol/L 5 1 4 2 4 2   CHLORIDE mmol/L 95* 95* 95*   CO2 mmol/L 22 25 24   BUN mg/dL 118* 82* 73*   CREATININE mg/dL 7 87* 5 66* 5 29*   CALCIUM mg/dL 7 9* 8 1* 8 0*   ALK PHOS U/L  --   --  71   ALT U/L  --   --  10*   AST U/L  --   --  23     Results from last 7 days   Lab Units 02/11/19  0525 02/10/19  0456 02/09/19  0548   INR  5 71* 3 61* 1 68*     EKG personally reviewed by David Sanchez MD      Counseling / Coordination of Care  Total floor / unit time spent today 40 minutes  Greater than 50% of total time was spent with the patient and / or family counseling and / or coordination of care  A description of the counseling / coordination of care: 20 min  Thank you for the opportunity to participate in the care of this patient      David Sanchez MD, PhD   Leonides Joseph

## 2019-02-11 NOTE — PROGRESS NOTES
[address] Hospitalist Service - Internal Medicine Progress Note       PATIENT INFORMATION      Patient: Gisela Leary 60 y o  male   MRN: [id number]  PCP: Theo Chaudhari DO  Unit/Bed#: [facility] 507-01 Encounter: [id number]  Date Of Visit: 02/11/19       ASSESSMENTS & PLAN       1  Multifactorial shock (Cardiogenic/Septic)  · S/p transfer out of ICU on 2/8 - hemodynamically stable   · See plans for cardiomyopathy/bacteremia below  · Initially presented with high-grade fever coupled with tachycardia/leukocytosis, lactic acidosis, and hypotension (component of cardiogenic shock attributed as well) requiring multiple vasopressors for hemodynamic stability - initial troponin was markedly elevated at 369 9 and progressively downtrended    2  Toxic metabolic encephalopathy  · Confusion waxing/waning improved today - sluggish to verbal responses - CT of head with nonspecific findings (appreciate neurology input)  · Likely multifactorial secondary to multiple medical issues  · Limit/avoid analgesics/sedatives unless absolutely necessary  · Continue clinical monitoring     3  MSSA bacteremia  · Last repeat blood cultures from 1/27 were negative - continue IV Ancef regimen per Infectious Disease for a six-week course through 3/10  · CHON negative for evidence of endocarditis     4  Acute renal failure - Hyperphosphatemia  · Currently dialysis dependent - nephrology following  · Continue PhosLo for his hyperphosphatemia (increased dosing today)  · Limit/avoid nephrotoxins as possible - renally dose antibiotic regimen    5  Acute systolic CHF - Cardiomyopathy  · Status post transfer out of ICU on 2/8 - currently dialysis dependent for fluid removal   · Titrated off Milrinone drip previously - continue beta-blockade with Lopressor  · EF of 30% with severe diffuse LV hypokinesis and mild TR  · Cardiology following - attributes cardiomyopathy to shock/tachycardia    6    Acute respiratory failure with hypoxia  · Status post extubation currently saturating on nasal cannula at rest - continue to wean down to room air as tolerated  · Continue to maintain oxygenation and titrate downwards if possible  · BIPAP QHS as necessary     7  Abnormal LFTs  · Normalized - likely an initial sequela of shock   · Hepatitis panel negative      8  Morbid obesity  · BMI of 54 24  · Lifestyle/diet modifications    9  New onset atrial fibrillation  · Appreciate cardiology input - currently back in normal sinus rhythm on Lopressor/Amiodarone  · Continue Coumadin for anticoagulation for INR target of 2-3 - continue to hold dosing as INR supratherapeutic in the setting of continued antibiotic use (Ancef) - last Coumadin dose was on the night of   · Likely triggered by septic/cardiogenic shock    10  Non-MI troponin elevation  · Troponin peak was > 40   · Multifactorial secondary to shock coupled with acute renal failure and new onset atrial fibrillation     11  History of bioprosthetic AVR  · CHON negative for vegetation in the setting of septic shock      VTE Prophylaxis:  Supratherapeutic INR      SUBJECTIVE     Seen/examined earlier this afternoon  More alert/awake today and states "I miss my dog"  Although he denies fever/chills at this time, his generalized weakness/fatigue persists  OBJECTIVE     Vitals:   Temp (24hrs), Av °F (36 7 °C), Min:97 5 °F (36 4 °C), Max:98 3 °F (36 8 °C)    Temp:  [97 5 °F (36 4 °C)-98 3 °F (36 8 °C)] 98 1 °F (36 7 °C)  HR:  [] 102  Resp:  [15-23] 18  BP: (108-143)/(51-91) 140/90  SpO2:  [95 %-97 %] 96 %  Body mass index is 45 71 kg/m²  Input and Output Summary (last 24 hours):        Intake/Output Summary (Last 24 hours) at 2019 1530  Last data filed at 2019 1300  Gross per 24 hour   Intake 810 ml   Output 3300 ml   Net -2490 ml       Physical Exam:     GENERAL:  Obese  HEAD:  Normocephalic - atraumatic  EYES: PERRL - EOMI   MOUTH:  Mucosa moist  NECK:  Supple - full range of motion  CARDIAC:  Regular rate/rhythm - S1/S2 positive  PULMONARY:  clear to auscultation bilaterally - decreased respiratory effort due to body habitus  ABDOMEN:  Soft - nontender/nondistended - active bowel sounds  MUSCULOSKELETAL:  Motor strength/range of motion markedly deconditioned - diffuse extremity pitting edema  NEUROLOGIC:  Alert/awake - weak/fatigued  SKIN:  Chronic wrinkles/blemishes - left hallux deep tissue injury noted      ADDITIONAL DATA       Labs & Recent Cultures:     Results from last 7 days   Lab Units 02/11/19  0525   WBC Thousand/uL 11 42*   HEMOGLOBIN g/dL 7 7*   HEMATOCRIT % 24 7*   PLATELETS Thousands/uL 215   NEUTROS PCT % 86*   LYMPHS PCT % 4*   MONOS PCT % 8   EOS PCT % 1     Results from last 7 days   Lab Units 02/11/19  0525  02/09/19  0548   SODIUM mmol/L 131*   < > 133*   POTASSIUM mmol/L 5 1   < > 4 2   CHLORIDE mmol/L 95*   < > 95*   CO2 mmol/L 22   < > 24   BUN mg/dL 118*   < > 73*   CREATININE mg/dL 7 87*   < > 5 29*   CALCIUM mg/dL 7 9*   < > 8 0*   ALK PHOS U/L  --   --  71   ALT U/L  --   --  10*   AST U/L  --   --  23    < > = values in this interval not displayed       Results from last 7 days   Lab Units 02/11/19  0525   INR  5 71*         Last 24 Hours Medication List:     Current Facility-Administered Medications:  acetaminophen 650 mg Oral Q6H PRN Mary Kay De Jesus MD    amiodarone 200 mg Oral Daily With Breakfast Fatuma Madden MD    bisacodyl 10 mg Rectal Daily PRN Garrick Reaves PA-C    calcium acetate 1,334 mg Oral BID Oscar Loyd MD    cefazolin 1,000 mg Intravenous Q24H Mary Kay De Jesus MD Last Rate: Stopped (02/10/19 1901)   guaiFENesin 600 mg Oral Q12H [address] Olive Umanzor PA-C    HYDROmorphone 1 mg Intravenous Q3H PRN Lidya Martinez    ipratropium 0 5 mg Nebulization Q6H PRN Jahaira Wright MD    levalbuterol 1 25 mg Nebulization Q6H PRN Jahaira Wright MD    metoprolol 5 mg Intravenous Q6H PRN Savannah Ptakowski,     metoprolol tartrate 75 mg Oral Q12H [address] [address] [facility] [address], MD    nystatin  Topical BID Christiano Kruse PA-C    oxyCODONE 10 mg Oral Q4H PRN Ilene Llamas MD    polyethylene glycol 17 g Oral Daily KATHY Mathias    polyvinyl alcohol 1 drop Both Eyes Q3H PRN Charmaine Santana PA-C    QUEtiapine 25 mg Oral HS Charmaine Santana PA-C    senna 1 tablet Oral HS Ilene Llamas MD           Time Spent for Care: 33 minutes  More than 50% of total time spent on counseling and coordination of care as described above  Current Length of Stay: 18 day(s)      Code Status: Level 1 - Full Code         ** Please Note: This note is constructed using a voice recognition dictation system   **

## 2019-02-11 NOTE — CONSULTS
Consultation - Neurology   Mervat Galloway 60 y o  male MRN: [id number]  Unit/Bed#: [facility] 507-01 Encounter: [id number]      Assessment/Plan   Assessment:  1  Abnormal CT brain which demonstrated decreased attenuation along with gray-white matter interface in the bilateral parietal occipital regions concerning for abscess, septic emboli, metastasis, and or ischemia  Radiology recommends follow-up MRI brain for clarification  2  Waxing/waning mental status, likely multifactorial including MSSA bacteremia, PATRICK with metabolic derangement, and possible contribution from changes demonstrated on his abnormal CT brain  Plan:  1  MRI brain to clarify findings on CT brain  Would like to obtain study with and without contrast   This would need to be set up on a dialysis day  2  Continued supportive care as per primary service  3  Patient also being followed by Cardiology, Infectious Disease, and Nephrology  4  Should patient continue to have fluctuating changes in mental status without identifiable cause, would consider EEG  5  Consider checking TSH  Would also check hemoglobin A1c and lipid panel  Discussed with attending neurologist   Neurology will remain available to advise  History of Present Illness     Reason for Consult / Principal Problem:  Encephalopathy; abnormal CT brain  Hx and PE limited by:  Patient's fatigue and inability to fully cooperate with history and exam   Patient currently undergoing hemodialysis  HPI: Mervat Galloway is a 60 y o  right handed male with past medical history of hypertension and dyslipidemia, status post AVR bioprosthesis, who has had a complicated hospital course thus far including MSSA bacteremia currently on cefazolin, cardiogenic/septic shock requiring the use of pressors, new onset AFib with RVR with currently supratherapeutic INR, PATRICK now on hemodialysis, NSTEMI, and waxing waning mentation    Neurology being asked to further evaluate patient due to abnormal CT brain demonstrating decreased attenuation along the gray-white matter interface in the bilateral parieto-occipital regions  The patient was unable to offer much in the way of history  As result, history was taken from the medical record  Per the record, he has had varied levels of mental orientation during his prolonged hospital stay  He was transferred from the ICU to the medical floor on 02/08/2019  Over the past couple of days, he has had waxing and waning confusion and has been sluggish to answer questions at times  He has been treated for MSSA bacteremia during his hospital stay with negative blood cultures on 01/27/2019 with recommendation to continue Ancef for a 6 week course through 03/10/2019  His CHON from 01/25/2019 did not demonstrate any valvular vegetations and EF was estimated at 40%  There was moderate diffuse hypokinesis  No thrombus identified  Patient does not appear to be reliable historian at this time but denies any prior history of stroke, TIA, or seizure  Inpatient consult to Neurology  Consult performed by: Lisa Hamilton PA-C  Consult ordered by: Daniel Landaverde MD          Review of Systems   Constitutional: Negative  HENT: Negative  Eyes: Negative  Respiratory: Negative  Cardiovascular: Negative  Gastrointestinal: Positive for diarrhea  Endocrine: Negative  Genitourinary: Positive for decreased urine volume (on HD)  Musculoskeletal: Positive for arthralgias  Skin: Negative for rash  Allergic/Immunologic: Positive for environmental allergies (hay fever)  Neurological: Positive for weakness (generalized)  As above  Hematological:        Supratherapeutic INR   Psychiatric/Behavioral: Positive for dysphoric mood         Historical Information   Past Medical History:   Diagnosis Date    Allergic rhinitis     last assessed 9/12/12    Finger fracture, right     Closed fx of the middle phalanx of the right 5th finger  last assessed 1/30/14  Hemorrhoids     last assessed 2/10/14    Hyperlipidemia     Hypertension      Past Surgical History:   Procedure Laterality Date    AORTIC VALVE REPLACEMENT  07/30/2014    AVR with 25mm [facility] Ease bovine pericardial valve    CARDIAC CATHETERIZATION  07/18/2014    SLB left main-normal, circumflex-normal, ramus intermedius-normal, RCA was large and dominant giving rise to the PDA & a large posterolateral branch  No disease  last assessed 8/19/14     IR [address] PLACEMENT  2/4/2019    KNEE CARTILAGE SURGERY Left     medial meniscus tear     TESTICLE SURGERY      TONSILLECTOMY AND ADENOIDECTOMY      TOOTH EXTRACTION       Social History   Social History     Substance and Sexual Activity   Alcohol Use No    Comment: ocassion /never drank alcohol per [employer]      Social History     Substance and Sexual Activity   Drug Use No     Social History     Tobacco Use   Smoking Status Never Smoker   Smokeless Tobacco Never Used     Family History:   Family History   Problem Relation Age of Onset    Lung cancer Mother     Heart disease Mother         pacemaker placement     Coronary artery disease Father     Hypertension Father     Heart attack Father     Cancer Family         bladder        Review of previous medical records was completed       Meds/Allergies   current meds:   Current Facility-Administered Medications   Medication Dose Route Frequency    acetaminophen (TYLENOL) tablet 650 mg  650 mg Oral Q6H PRN    amiodarone tablet 200 mg  200 mg Oral Daily With Breakfast    bisacodyl (DULCOLAX) rectal suppository 10 mg  10 mg Rectal Daily PRN    calcium acetate (PHOSLO) capsule 1,334 mg  1,334 mg Oral BID    ceFAZolin (ANCEF) 1 g in sodium chloride 0 9% 50 ml IVPB  1,000 mg Intravenous Q24H    guaiFENesin (MUCINEX) 12 hr tablet 600 mg  600 mg Oral Q12H ARACELIS    HYDROmorphone (DILAUDID) injection 1 mg  1 mg Intravenous Q3H PRN    ipratropium (ATROVENT) 0 02 % inhalation solution 0 5 mg  0 5 mg Nebulization Q6H PRN    levalbuterol (XOPENEX) inhalation solution 1 25 mg  1 25 mg Nebulization Q6H PRN    metoprolol (LOPRESSOR) injection 5 mg  5 mg Intravenous Q6H PRN    metoprolol tartrate (LOPRESSOR) tablet 75 mg  75 mg Oral Q12H ARACELIS    nystatin (MYCOSTATIN) powder   Topical BID    oxyCODONE (ROXICODONE) immediate release tablet 10 mg  10 mg Oral Q4H PRN    polyethylene glycol (MIRALAX) packet 17 g  17 g Oral Daily    polyvinyl alcohol (LIQUIFILM TEARS) 1 4 % ophthalmic solution 1 drop  1 drop Both Eyes Q3H PRN    QUEtiapine (SEROquel) tablet 25 mg  25 mg Oral HS    senna (SENOKOT) tablet 8 6 mg  1 tablet Oral HS       Allergies   Allergen Reactions    Penicillins Rash     However, has subsequently tolerated Cefazolin and Cefepime       Objective   Vitals:Blood pressure 139/73, pulse (!) 49, temperature 97 5 °F (36 4 °C), temperature source Tympanic, resp  rate 18, height 5' 9" (1 753 m), weight (!) 140 kg (309 lb 8 4 oz), SpO2 96 %  ,Body mass index is 45 71 kg/m²  Intake/Output Summary (Last 24 hours) at 2/11/2019 0945  Last data filed at 2/11/2019 0806  Gross per 24 hour   Intake 610 ml   Output 0 ml   Net 610 ml       Invasive Devices: Invasive Devices     Central Venous Catheter Line            CVC Central Lines 02/04/19 Triple Left Internal jugular 6 days          Hemodialysis Catheter            Permanent HD Catheter  6 days                Physical Exam   Patient is alert but when will fall asleep when left unstimulated  HEENT:  Head normocephalic  Eyes anicteric  Bilateral cataract lenses noted  Heart:  With irregular rhythm  Lungs:  Grossly clear to auscultation with poor inspiratory effort  Abdomen:  Obese soft nontender  Extremities:  Mild pitting edema all 4 distal extremities  Neurologic Exam:  Extremely limited neurologic exam as patient significantly fatigued and unable/unwilling to fully participate  Patient alerts to voice    Oriented to self and location but could not state the year (said I do not know even with encouragement)  Correctly identified the current president  Easily distracted  Could not participate with full mental status testing and had difficulty with simple calculations  Accurate with naming  Pupils equal round reactive to light  Patient tended to stare straight ahead and had difficulty with EOM testing  Did appear to have full leftward gaze and downward gaze but could not or would not participate with right would upward gaze testing  Able to finger count bilaterally and blinked to threat bilaterally  Visual fields appeared full to confrontation  Symmetrical smile  Tongue and palate midline  Gait was not assessed as patient currently undergoing dialysis  Would not or could not participate with finger-to-nose or heel-to-shin maneuvers  Was able to spontaneously move all 4 extremities  Able to lift bilateral upper extremities against gravity  Would not/could not elevate lower extremities against gravity but could wiggle toes bilaterally  Sensory testing unreliable  Did appear intact to light touch throughout  Reflexes 2 throughout the right upper extremity, 1 throughout the left upper extremity, and trace to absent at the bilateral knees and ankles  Toe responses were downgoing bilaterally      Lab Results:   CBC:   Results from last 7 days   Lab Units 02/11/19  0525 02/10/19  0456 02/09/19  0548   WBC Thousand/uL 11 42* 11 02* 11 60*   RBC Million/uL 2 83* 2 81* 2 87*   HEMOGLOBIN g/dL 7 7* 7 5* 7 5*   HEMATOCRIT % 24 7* 24 7* 25 0*   MCV fL 87 88 87   PLATELETS Thousands/uL 215 234 234   , BMP/CMP:   Results from last 7 days   Lab Units 02/11/19  0525 02/09/19  1113 02/09/19  0548   SODIUM mmol/L 131* 130* 133*   POTASSIUM mmol/L 5 1 4 2 4 2   CHLORIDE mmol/L 95* 95* 95*   CO2 mmol/L 22 25 24   BUN mg/dL 118* 82* 73*   CREATININE mg/dL 7 87* 5 66* 5 29*   CALCIUM mg/dL 7 9* 8 1* 8 0*   AST U/L  --   --  23   ALT U/L  --   --  10*   ALK PHOS U/L  --   --  71   EGFR ml/min/1 73sq m 7 10 11   , HgBA1C:   , TSH:   , Coagulation:   Results from last 7 days   Lab Units 02/11/19  0525   INR  5 71*   , Lipid Profile:     Imaging Studies: I have personally reviewed pertinent reports  and I have personally reviewed pertinent films in PACS  EKG, Pathology, and Other Studies: I have personally reviewed pertinent reports  VTE Prophylaxis: Reason for no pharmacologic prophylaxis Supratherapeutic INR    Code Status: Level 1 - Full Code  Advance Directive and Living Will:      Power of :    POLST:      Counseling / Coordination of Care  Total time spent today 55 minutes  Greater than 50% of total time was spent with the patient and / or family counseling and / or coordination of care  A description of the counseling / coordination of care: Discussed with attending neurologist; personally reviewed imaging in laboratory studies; discussed with patient plan of care

## 2019-02-11 NOTE — PLAN OF CARE
Problem: Potential for Falls  Goal: Patient will remain free of falls  Description  INTERVENTIONS:  - Assess patient frequently for physical needs  -  Identify cognitive and physical deficits and behaviors that affect risk of falls  -  [place] fall precautions as indicated by assessment   - Educate patient/family on patient safety including physical limitations  - Instruct patient to call for assistance with activity based on assessment  - Modify environment to reduce risk of injury  - Consider OT/PT consult to assist with strengthening/mobility    Outcome: Progressing     Problem: Prexisting or High Potential for Compromised Skin Integrity  Goal: Skin integrity is maintained or improved  Description  INTERVENTIONS:  - Identify patients at risk for skin breakdown  - Assess and monitor skin integrity  - Assess and monitor nutrition and hydration status  - Monitor labs (i e  albumin)  - Assess for incontinence   - Turn and reposition patient  - Assist with mobility/ambulation  - Relieve pressure over bony prominences  - Avoid friction and shearing  - Provide appropriate hygiene as needed including keeping skin clean and dry  - Evaluate need for skin moisturizer/barrier cream  - Collaborate with interdisciplinary team (i e  Nutrition, Rehabilitation, etc )   - Patient/family teaching   Outcome: Progressing     Problem: Nutrition/Hydration-ADULT  Goal: Nutrient/Hydration intake appropriate for improving, restoring or maintaining nutritional needs  Description  Monitor and assess patient's nutrition/hydration status for malnutrition (ex- brittle hair, bruises, dry skin, pale skin and conjunctiva, muscle wasting, smooth red tongue, and disorientation)  Collaborate with interdisciplinary team and initiate plan and interventions as ordered  Monitor patient's weight and dietary intake as ordered or per policy  Utilize nutrition screening tool and intervene per policy   Determine patient's food preferences and provide high-protein, high-caloric foods as appropriate  INTERVENTIONS:  - Monitor oral intake, urinary output, labs, and treatment plans  - Assess nutrition and hydration status and recommend course of action  - Evaluate amount of meals eaten  - Assist patient with eating if necessary   - Allow adequate time for meals  - Recommend/ encourage appropriate diets, oral nutritional supplements, and vitamin/mineral supplements  - Order, calculate, and assess calorie counts as needed  - Recommend, monitor, and adjust tube feedings and TPN/PPN based on assessed needs  - Assess need for intravenous fluids  - Provide specific nutrition/hydration education as appropriate  - Include patient/family/caregiver in decisions related to nutrition   Outcome: Progressing     Problem: CARDIOVASCULAR - ADULT  Goal: Maintains optimal cardiac output and hemodynamic stability  Description  INTERVENTIONS:  - Monitor I/O, vital signs and rhythm  - Monitor for S/S and trends of decreased cardiac output i e  bleeding, hypotension  - Administer and titrate ordered vasoactive medications to optimize hemodynamic stability  - Assess quality of pulses, skin color and temperature  - Assess for signs of decreased coronary artery perfusion - ex   Angina  - Instruct patient to report change in severity of symptoms   Outcome: Progressing  Goal: Absence of cardiac dysrhythmias or at baseline rhythm  Description  INTERVENTIONS:  - Continuous cardiac monitoring, monitor vital signs, obtain 12 lead EKG if indicated  - Administer antiarrhythmic and heart rate control medications as ordered  - Monitor electrolytes and administer replacement therapy as ordered   Outcome: Progressing     Problem: METABOLIC, FLUID AND ELECTROLYTES - ADULT  Goal: Electrolytes maintained within normal limits  Description  INTERVENTIONS:  - Monitor labs and assess patient for signs and symptoms of electrolyte imbalances  - Administer electrolyte replacement as ordered  - Monitor response to electrolyte replacements, including repeat lab results as appropriate  - Instruct patient on fluid and nutrition as appropriate   Outcome: Progressing  Goal: Fluid balance maintained  Description  INTERVENTIONS:  - Monitor labs and assess for signs and symptoms of volume excess or deficit  - Monitor I/O and WT  - Instruct patient on fluid and nutrition as appropriate   Outcome: Progressing     Problem: PAIN - ADULT  Goal: Verbalizes/displays adequate comfort level or baseline comfort level  Description  Interventions:  - Encourage patient to monitor pain and request assistance  - Assess pain using appropriate pain scale  - Administer analgesics based on type and severity of pain and evaluate response  - Implement non-pharmacological measures as appropriate and evaluate response  - Consider cultural and social influences on pain and pain management  - Notify physician/advanced practitioner if interventions unsuccessful or patient reports new pain   Outcome: Progressing     Problem: INFECTION - ADULT  Goal: Absence or prevention of progression during hospitalization  Description  INTERVENTIONS:  - Assess and monitor for signs and symptoms of infection  - Monitor lab/diagnostic results  - Monitor all insertion sites, i e  indwelling lines, tubes, and drains  - Monitor endotracheal (as able) and nasal secretions for changes in amount and color  - [place] appropriate cooling/warming therapies per order  - Administer medications as ordered  - Instruct and encourage patient and family to use good hand hygiene technique  - Identify and instruct in appropriate isolation precautions for identified infection/condition    Outcome: Progressing     Problem: SAFETY ADULT  Goal: Patient will remain free of falls  Description  INTERVENTIONS:  - Assess patient frequently for physical needs  -  Identify cognitive and physical deficits and behaviors that affect risk of falls    -  [place] fall precautions as indicated by assessment   - Educate patient/family on patient safety including physical limitations  - Instruct patient to call for assistance with activity based on assessment  - Modify environment to reduce risk of injury  - Consider OT/PT consult to assist with strengthening/mobility    Outcome: Progressing  Goal: Maintain or return to baseline ADL function  Description  INTERVENTIONS:  -  Assess patient's ability to carry out ADLs; assess patient's baseline for ADL function and identify physical deficits which impact ability to perform ADLs (bathing, care of mouth/teeth, toileting, grooming, dressing, etc )  - Assess/evaluate cause of self-care deficits   - Assess range of motion  - Assess patient's mobility; develop plan if impaired  - Assess patient's need for assistive devices and provide as appropriate  - Encourage maximum independence but intervene and supervise when necessary  ¯ Involve family in performance of ADLs  ¯ Assess for home care needs following discharge   ¯ Request OT consult to assist with ADL evaluation and planning for discharge  ¯ Provide patient education as appropriate    Outcome: Progressing  Goal: Maintain or return mobility status to optimal level  Description  INTERVENTIONS:  - Assess patient's baseline mobility status (ambulation, transfers, stairs, etc )    - Identify cognitive and physical deficits and behaviors that affect mobility  - Identify mobility aids required to assist with transfers and/or ambulation (gait belt, sit-to-stand, lift, walker, cane, etc )  - [place] fall precautions as indicated by assessment  - Record patient progress and toleration of activity level on Mobility SBAR; progress patient to next Phase/Stage  - Instruct patient to call for assistance with activity based on assessment  - Request Rehabilitation consult to assist with strengthening/weightbearing, etc     Outcome: Progressing     Problem: DISCHARGE PLANNING  Goal: Discharge to home or other facility with appropriate resources  Description  INTERVENTIONS:  - Identify barriers to discharge w/patient and caregiver  - Arrange for needed discharge resources and transportation as appropriate  - Identify discharge learning needs (meds, wound care, etc )  - Arrange for interpretive services to assist at discharge as needed  - Refer to Case Management Department for coordinating discharge planning if the patient needs post-hospital services based on physician/advanced practitioner order or complex needs related to functional status, cognitive ability, or social support system   Outcome: Progressing     Problem: Knowledge Deficit  Goal: Patient/family/caregiver demonstrates understanding of disease process, treatment plan, medications, and discharge instructions  Description  Complete learning assessment and assess knowledge base    Interventions:  - Provide teaching at level of understanding  - Provide teaching via preferred learning methods   Outcome: Progressing     Problem: GENITOURINARY - ADULT  Goal: Maintains or returns to baseline urinary function  Description  INTERVENTIONS:  - Assess urinary function  - Encourage oral fluids to ensure adequate hydration  - Administer IV fluids as ordered to ensure adequate hydration  - Administer ordered medications as needed  - Offer frequent toileting  - Follow urinary retention protocol if ordered   Outcome: Progressing  Goal: Absence of urinary retention  Description  INTERVENTIONS:  - Assess patient?s ability to void and empty bladder  - Monitor I/O  - Bladder scan as needed  - Discuss with physician/AP medications to alleviate retention as needed  - Discuss catheterization for long term situations as appropriate   Outcome: Progressing     Problem: DISCHARGE PLANNING - CARE MANAGEMENT  Goal: Discharge to post-acute care or home with appropriate resources  Description  INTERVENTIONS:  - Conduct assessment to determine patient/family and health care team treatment goals, and need for post-acute services based on payer coverage, community resources, and patient preferences, and barriers to discharge  - Address psychosocial, clinical, and financial barriers to discharge as identified in assessment in conjunction with the patient/family and health care team  - Arrange appropriate level of post-acute services according to patient's   needs and preference and payer coverage in collaboration with the physician and health care team  - Communicate with and update the patient/family, physician, and health care team regarding progress on the discharge plan  - Arrange appropriate transportation to post-acute venues  - Pt to d/c with appropriate resources when medically stable     Outcome: Progressing

## 2019-02-11 NOTE — PHYSICAL THERAPY NOTE
PT Cancellation    Pt chart reviewed  Pt is currently off the floor in HD  Will follow and see as able      Masha Alcantara, PT, DPT

## 2019-02-11 NOTE — PROGRESS NOTES
Progress Note - Infectious Disease   Ginny Bellamy 60 y o  male MRN: [id number]  Unit/Bed#: [facility] 507-01 Encounter: [id number]      Impression/Plan:  1  Severe sepsis/septic shock   Fever, tachycardia, leukocytosis, hypotension   Also with component of cardiogenic shock  Likely precipitated by MSSA bacteremia   No other clear evidence of acute infection identified   Fevers, procalcitonin improved  Leukocytosis persists      -antibiotic plan as below  -monitor temperatures and hemodynamics  -ongoing supportive care as per primary     2  MSSA bacteremia   Possibility of endocarditis considered in the setting of bioprosthetic AVR   CHON with no evidence of valvular vegetations   Initial portal of entry may have been related to multiple upper back wounds   CT chest/abdomen/pelvis with no other evidence of acute infection   Possible from pneumonia as sputum culture was positive for MSSA   Bacteremia eventually cleared  Podiatry evaluated patient's feet and no acute issues  The patient recently though seems to have waxing and waning mental status  CT head was done which was abnormal      -continue with renally dosed IV cefazolin  -neurology evaluation appreciated  -agree with obtaining MRI of the brain, have asked primary service to include imaging of the neck given changes at prior line site  -if findings are consistent with septic emboli then will add rifampin to patient's current regimen  -will also need to consider repeat transesophageal echo in the setting of bioprosthetic valve and to rule out the need of any surgical intervention  -patient has past the 1st 2 weeks of treatment for bacteremia and so if MRI is positive for septic emboli there is no role for gentamicin at this time  -patient will ultimately require prolonged course of IV antibiotics of at least 6 weeks through 3/10  -antibiotics will likely be dosed with hemodialysis at discharge     3   History of bioprosthetic AVR   Secondary to degenerative AS   Placed in July 2014  David Villalobos no evidence of endocarditis      4  Acute kidney injury   Likely ischemic/septic ATN    Patient remains on hemodialysis   He is status post PermCath placement     -antibiotics dosed for renal dysfunction  -ongoing follow-up by Nephrology  -antibiotics can be dosed with hemodialysis at discharge     5  Acute hypoxic respiratory failure   Likely in the setting of septic shock as well as acute systolic cardiomyopathy/volume overload   Recent CT chest with no evidence to suggest pulmonary infection   Patient remains  well on nasal cannula      -monitor secretions  -monitor O2 requirements  -ongoing supportive care as per primary    6  Leukocytosis:  Patient continues to have low level leukocytosis though he remains otherwise hemodynamically stable  As above he has been having recent changes in his mental status and CT of the head was noted to be abnormal   Would also question if there is a developing process in the patient's neck as prior central line site   -continue to monitor fever curve/vitals  -continue to trend CBC  -patient plan for MRI as above, have asked for imaging of the neck as well  -patient may require additional workup as above depending on imaging results  -continue patient on Ancef at this time  -patient spikes fever please send repeat cultures    Above plan discussed in detail with the primary service  ID consult service will continue to follow  Antibiotics:  Ancef    Recent events:  Patient was transferred to medical floor  Patient is dialysis dependent per nephrology note  Patient was noted to respond sluggishly on exam yesterday  CT of the head was done  He is currently afebrile  White blood cell count 11 4  No new culture data  His other vitals remained stable  Neurology evaluation pending    Subjective:  Patient is awake on exam but appears confused    He is able to tell me that he is at [facility] but he does not recall why he is here   He is aware that he has been here for over 2 weeks  He then will intermittently starts rambling about his family, his dog and then other unrelated topics  He has pain in his joints would being adjusted in the bed per nursing  Objective:  Vitals:  Temp:  [97 8 °F (36 6 °C)-98 8 °F (37 1 °C)] 98 3 °F (36 8 °C)  HR:  [] 75  Resp:  [15-20] 15  BP: (108-135)/(56-88) 132/72  SpO2:  [95 %-97 %] 96 %  Temp (24hrs), Av 2 °F (36 8 °C), Min:97 8 °F (36 6 °C), Max:98 8 °F (37 1 °C)  Current: Temperature: 98 3 °F (36 8 °C)    Physical Exam:   General Appearance:  Alert, distracted on exam, nontoxic, no acute distress  Throat: Oropharynx moist without lesions  Lungs:   Decreased breath sounds throughout; no wheezes, rhonchi or rales; respirations unlabored on room air   Heart:  RRR; no murmur, rub or gallop though distant heart sounds   Abdomen:   Soft, non-tender, non-distended, positive bowel sounds  Obese abdomen    Extremities: No clubbing, cyanosis; patient has ongoing edema in all of his extremities  Ecchymotic changes still present in his toes  He is noted to have some erythema along the right neck more so than prior  Neuro: Patient is oriented to self and to place  He is disoriented to time  No cranial nerve deficits on exam   He is able to move his fingers and toes on exam is mobility seems to be limited either by generalized dilatation or swelling  Skin: No new rashes or lesions  No new draining wounds noted         Labs, Imaging, & Other studies:   All pertinent labs and imaging studies were personally reviewed  Results from last 7 days   Lab Units 19  0525 02/10/19  0456 19  0548   WBC Thousand/uL 11 42* 11 02* 11 60*   HEMOGLOBIN g/dL 7 7* 7 5* 7 5*   PLATELETS Thousands/uL 215 234 234     Results from last 7 days   Lab Units 19  0525  19  0548   POTASSIUM mmol/L 5 1   < > 4 2   CHLORIDE mmol/L 95*   < > 95*   CO2 mmol/L 22   < > 24   BUN mg/dL 118*   < > 73* CREATININE mg/dL 7 87*   < > 5 29*   EGFR ml/min/1 73sq m 7   < > 11   CALCIUM mg/dL 7 9*   < > 8 0*   AST U/L  --   --  23   ALT U/L  --   --  10*   ALK PHOS U/L  --   --  71    < > = values in this interval not displayed

## 2019-02-11 NOTE — PROGRESS NOTES
NEPHROLOGY PROGRESS NOTE   Juan Alberto Ch 60 y o  male MRN: [id number]  Unit/Bed#: [facility] 507-01 Encounter: [id number]  Reason for Consult:  Acute kidney injury    ASSESSMENT/PLAN:  1  Acute kidney injury, likely secondary to ATN, remains hemodialysis dependent  2  Recent shock, likely multifactorial with cardiogenic/septic  3  MSSA bacteremia, 6 weeks of antibiotic treatment  4  Encephalopathy, waxing and waning  5  Anemia of chronic disease  6  Hyperphosphatemia, adjust phosphate binders  7  Cardiomyopathy, ejection fraction 30%  8  Atrial fibrillation with history of aortic valve replacement    PLAN:  · Continue with hemodialysis, Monday Wednesday Friday  · Ultrafiltrate 1-2 L  · Increase PhosLo to 3 tablets t i d   · No signs of renal recovery at this time    SUBJECTIVE:  Seen and examined  Patient awake, responsive, currently on hemodialysis  No acute distress  Review of Systems    OBJECTIVE:  Current Weight: Weight - Scale: (!) 140 kg (309 lb 8 4 oz)  Vitals:    02/11/19 0900 02/11/19 0930 02/11/19 1000 02/11/19 1030   BP: 112/70 139/73 137/70 114/68   BP Location: Right arm Right arm     Pulse: 62 (!) 49 (!) 46 66   Resp:       Temp:       TempSrc:       SpO2:       Weight:       Height:           Intake/Output Summary (Last 24 hours) at 2/11/2019 1037  Last data filed at 2/11/2019 0806  Gross per 24 hour   Intake 610 ml   Output 0 ml   Net 610 ml       Physical Exam   Constitutional: Vital signs are normal  No distress  Currently on hemodialysis   HENT:   Head: Normocephalic  Eyes: No scleral icterus  Neck: Neck supple  No JVD present  Cardiovascular: Normal rate  An irregular rhythm present  Pulmonary/Chest: Effort normal and breath sounds normal  No respiratory distress  Abdominal: Soft  He exhibits distension  There is no tenderness  Musculoskeletal: He exhibits edema (2+ edema)  Neurological: He is alert  Skin: Skin is warm         Medications:    Current Facility-Administered Medications:     acetaminophen (TYLENOL) tablet 650 mg, 650 mg, Oral, Q6H PRN, Chantal Delgado MD, 650 mg at 02/07/19 1149    amiodarone tablet 200 mg, 200 mg, Oral, Daily With Breakfast, Bonnie Coley MD, 200 mg at 02/10/19 0804    bisacodyl (DULCOLAX) rectal suppository 10 mg, 10 mg, Rectal, Daily PRN, Jill Acharya PA-C    calcium acetate (PHOSLO) capsule 1,334 mg, 1,334 mg, Oral, BID, Isrrael Heck MD, 1,334 mg at 02/10/19 1744    ceFAZolin (ANCEF) 1 g in sodium chloride 0 9% 50 ml IVPB, 1,000 mg, Intravenous, Q24H, Chantal Delgado MD, Stopped at 02/10/19 1901    guaiFENesin (MUCINEX) 12 hr tablet 600 mg, 600 mg, Oral, Q12H [address], Olive Umanzor PA-C, 600 mg at 02/10/19 2140    HYDROmorphone (DILAUDID) injection 1 mg, 1 mg, Intravenous, Q3H PRN, [facility], 1 mg at 02/11/19 0747    ipratropium (ATROVENT) 0 02 % inhalation solution 0 5 mg, 0 5 mg, Nebulization, Q6H PRN, Kian Varela MD    [facility] inhalation solution 1 25 mg, 1 25 mg, Nebulization, Q6H PRN, Kian Varela MD    metoprolol (LOPRESSOR) injection 5 mg, 5 mg, Intravenous, Q6H PRN, Savannah Oro DO, 5 mg at 02/08/19 1706    metoprolol tartrate (LOPRESSOR) tablet 75 mg, 75 mg, Oral, Q12H [address], Bonnie Coley MD, 75 mg at 02/10/19 2140    nystatin (MYCOSTATIN) powder, , Topical, BID, Jill Acharya PA-C    oxyCODONE (ROXICODONE) immediate release tablet 10 mg, 10 mg, Oral, Q4H PRN, Chantal Delgado MD, 10 mg at 02/10/19 1936    polyethylene glycol (MIRALAX) packet 17 g, 17 g, Oral, Daily, KATHY Mathias, 17 g at 02/10/19 0806    polyvinyl alcohol (LIQUIFILM TEARS) 1 4 % ophthalmic solution 1 drop, 1 drop, Both Eyes, Q3H PRN, Valentino Fordyce, PA-C, 1 drop at 02/07/19 0813    QUEtiapine (SEROquel) tablet 25 mg, 25 mg, Oral, HS, Valentino Fordyce, PA-C, 25 mg at 02/10/19 2140    senna (SENOKOT) tablet 8 6 mg, 1 tablet, Oral, HS, Chantal Delgado MD, 8 6 mg at 02/10/19 2140    Laboratory Results:  Results from last 7 days   Lab Units 02/11/19  0525 02/10/19  0456 02/09/19  1113 02/09/19  0548 02/08/19  0525 02/07/19  0455 02/06/19  0458 02/05/19  0541 02/04/19  1845   WBC Thousand/uL 11 42* 11 02*  --  11 60* 13 04* 9 37 11 06* 10 20*  --    HEMOGLOBIN g/dL 7 7* 7 5*  --  7 5* 7 8* 7 6* 7 8* 7 7*  --    HEMATOCRIT % 24 7* 24 7*  --  25 0* 25 8* 24 8* 24 6* 25 0*  --    PLATELETS Thousands/uL 215 234  --  234 260 191 172 145*  --    POTASSIUM mmol/L 5 1  --  4 2 4 2 4 6 4 4 4 8 4 6  --    CHLORIDE mmol/L 95*  --  95* 95* 94* 99* 98* 100  --    CO2 mmol/L 22  --  25 24 23 26 23 25  --    BUN mg/dL 118*  --  82* 73* 87* 62* 79* 58*  --    CREATININE mg/dL 7 87*  --  5 66* 5 29* 5 84* 4 43* 5 33* 4 06*  --    CALCIUM mg/dL 7 9*  --  8 1* 8 0* 8 1* 7 7* 8 0* 7 8*  --    MAGNESIUM mg/dL 2 8* 2 8*  --  2 6 2 8* 2 5 2 5 2 4  --    PHOSPHORUS mg/dL 10 6* 8 9*  --  8 6*  --  7 3* 8 1* 6 9* 5 1*

## 2019-02-12 PROBLEM — N17.9 AKI (ACUTE KIDNEY INJURY) (HCC): Status: ACTIVE | Noted: 2019-02-12

## 2019-02-12 PROBLEM — N18.6 ESRD (END STAGE RENAL DISEASE) (HCC): Status: RESOLVED | Noted: 2019-01-23 | Resolved: 2019-02-12

## 2019-02-12 PROBLEM — I21.4 NSTEMI (NON-ST ELEVATED MYOCARDIAL INFARCTION) (HCC): Status: RESOLVED | Noted: 2019-01-23 | Resolved: 2019-02-12

## 2019-02-12 LAB
ANION GAP SERPL CALCULATED.3IONS-SCNC: 14 MMOL/L (ref 4–13)
BASOPHILS # BLD AUTO: 0.02 THOUSANDS/ΜL (ref 0–0.1)
BASOPHILS NFR BLD AUTO: 0 % (ref 0–1)
BUN SERPL-MCNC: 86 MG/DL (ref 5–25)
CALCIUM SERPL-MCNC: 7.7 MG/DL (ref 8.3–10.1)
CHLORIDE SERPL-SCNC: 94 MMOL/L (ref 100–108)
CO2 SERPL-SCNC: 24 MMOL/L (ref 21–32)
CREAT SERPL-MCNC: 6.64 MG/DL (ref 0.6–1.3)
EOSINOPHIL # BLD AUTO: 0.08 THOUSAND/ΜL (ref 0–0.61)
EOSINOPHIL NFR BLD AUTO: 1 % (ref 0–6)
ERYTHROCYTE [DISTWIDTH] IN BLOOD BY AUTOMATED COUNT: 17.7 % (ref 11.6–15.1)
GFR SERPL CREATININE-BSD FRML MDRD: 8 ML/MIN/1.73SQ M
GLUCOSE SERPL-MCNC: 91 MG/DL (ref 65–140)
HCT VFR BLD AUTO: 24.3 % (ref 36.5–49.3)
HGB BLD-MCNC: 7.4 G/DL (ref 12–17)
IMM GRANULOCYTES # BLD AUTO: 0.14 THOUSAND/UL (ref 0–0.2)
IMM GRANULOCYTES NFR BLD AUTO: 1 % (ref 0–2)
INR PPP: 3.93 (ref 0.86–1.17)
LYMPHOCYTES # BLD AUTO: 0.37 THOUSANDS/ΜL (ref 0.6–4.47)
LYMPHOCYTES NFR BLD AUTO: 4 % (ref 14–44)
MAGNESIUM SERPL-MCNC: 2.7 MG/DL (ref 1.6–2.6)
MCH RBC QN AUTO: 26.2 PG (ref 26.8–34.3)
MCHC RBC AUTO-ENTMCNC: 30.5 G/DL (ref 31.4–37.4)
MCV RBC AUTO: 86 FL (ref 82–98)
MONOCYTES # BLD AUTO: 0.81 THOUSAND/ΜL (ref 0.17–1.22)
MONOCYTES NFR BLD AUTO: 8 % (ref 4–12)
NEUTROPHILS # BLD AUTO: 8.71 THOUSANDS/ΜL (ref 1.85–7.62)
NEUTS SEG NFR BLD AUTO: 86 % (ref 43–75)
NRBC BLD AUTO-RTO: 0 /100 WBCS
PHOSPHATE SERPL-MCNC: 8.9 MG/DL (ref 2.7–4.5)
PLATELET # BLD AUTO: 212 THOUSANDS/UL (ref 149–390)
PMV BLD AUTO: 11 FL (ref 8.9–12.7)
POTASSIUM SERPL-SCNC: 4.6 MMOL/L (ref 3.5–5.3)
PROTHROMBIN TIME: 38.4 SECONDS (ref 11.8–14.2)
RBC # BLD AUTO: 2.82 MILLION/UL (ref 3.88–5.62)
SODIUM SERPL-SCNC: 132 MMOL/L (ref 136–145)
WBC # BLD AUTO: 10.13 THOUSAND/UL (ref 4.31–10.16)

## 2019-02-12 PROCEDURE — 85610 PROTHROMBIN TIME: CPT | Performed by: INTERNAL MEDICINE

## 2019-02-12 PROCEDURE — 99232 SBSQ HOSP IP/OBS MODERATE 35: CPT | Performed by: INTERNAL MEDICINE

## 2019-02-12 PROCEDURE — 84100 ASSAY OF PHOSPHORUS: CPT | Performed by: INTERNAL MEDICINE

## 2019-02-12 PROCEDURE — 85025 COMPLETE CBC W/AUTO DIFF WBC: CPT | Performed by: INTERNAL MEDICINE

## 2019-02-12 PROCEDURE — 80048 BASIC METABOLIC PNL TOTAL CA: CPT | Performed by: INTERNAL MEDICINE

## 2019-02-12 PROCEDURE — 94760 N-INVAS EAR/PLS OXIMETRY 1: CPT

## 2019-02-12 PROCEDURE — 99232 SBSQ HOSP IP/OBS MODERATE 35: CPT | Performed by: PHYSICIAN ASSISTANT

## 2019-02-12 PROCEDURE — 83735 ASSAY OF MAGNESIUM: CPT | Performed by: INTERNAL MEDICINE

## 2019-02-12 PROCEDURE — 94660 CPAP INITIATION&MGMT: CPT

## 2019-02-12 RX ORDER — TORSEMIDE 20 MG/1
80 TABLET ORAL DAILY
Status: DISCONTINUED | OUTPATIENT
Start: 2019-02-12 | End: 2019-02-21

## 2019-02-12 RX ORDER — AMOXICILLIN 250 MG
1 CAPSULE ORAL 2 TIMES DAILY
Status: DISCONTINUED | OUTPATIENT
Start: 2019-02-12 | End: 2019-02-21

## 2019-02-12 RX ADMIN — CEFAZOLIN SODIUM 1000 MG: 10 INJECTION, POWDER, FOR SOLUTION INTRAVENOUS at 18:00

## 2019-02-12 RX ADMIN — GUAIFENESIN 600 MG: 600 TABLET, EXTENDED RELEASE ORAL at 10:44

## 2019-02-12 RX ADMIN — GUAIFENESIN 600 MG: 600 TABLET, EXTENDED RELEASE ORAL at 22:34

## 2019-02-12 RX ADMIN — SENNOSIDES AND DOCUSATE SODIUM 1 TABLET: 8.6; 5 TABLET ORAL at 10:44

## 2019-02-12 RX ADMIN — OXYCODONE HYDROCHLORIDE 10 MG: 10 TABLET ORAL at 20:11

## 2019-02-12 RX ADMIN — QUETIAPINE FUMARATE 25 MG: 25 TABLET ORAL at 22:34

## 2019-02-12 RX ADMIN — METOPROLOL TARTRATE 75 MG: 50 TABLET, FILM COATED ORAL at 10:43

## 2019-02-12 RX ADMIN — SENNOSIDES AND DOCUSATE SODIUM 1 TABLET: 8.6; 5 TABLET ORAL at 17:57

## 2019-02-12 RX ADMIN — CALCIUM ACETATE 1334 MG: 667 CAPSULE ORAL at 17:57

## 2019-02-12 RX ADMIN — METOPROLOL TARTRATE 75 MG: 50 TABLET, FILM COATED ORAL at 22:33

## 2019-02-12 RX ADMIN — CALCIUM ACETATE 1334 MG: 667 CAPSULE ORAL at 10:44

## 2019-02-12 RX ADMIN — TORSEMIDE 80 MG: 20 TABLET ORAL at 14:50

## 2019-02-12 RX ADMIN — NYSTATIN: 100000 POWDER TOPICAL at 10:51

## 2019-02-12 RX ADMIN — NYSTATIN: 100000 POWDER TOPICAL at 18:00

## 2019-02-12 RX ADMIN — AMIODARONE HYDROCHLORIDE 200 MG: 200 TABLET ORAL at 10:50

## 2019-02-12 RX ADMIN — OXYCODONE HYDROCHLORIDE 10 MG: 10 TABLET ORAL at 10:44

## 2019-02-12 NOTE — NUTRITION
02/12/19 1336   Recommendations/Interventions   Interventions Diet: continued as ordered   Nutrition Recommendations Continue diet as ordered; Other (specify)  (Rec: Nephrocaps and adjust PhosLo)

## 2019-02-12 NOTE — PROGRESS NOTES
Progress Note - Neurology   Juuj Thomas 60 y o  male MRN: [id number]  Unit/Bed#: [facility] 507-01 Encounter: [id number]    Assessment:  1  Abnormal CT brain with decreased attenuation in the bilateral parietal occipital areas, felt to represent ischemic infarcts, likely related to the patient's new onset AFib  Patient now with supratherapeutic INR  Exam does demonstrate weakness, moreso on the left as compared to right  2  Waxing and waning mental status, likely metabolic in origin  Today, patient appears alert and keenly responsive  Plan:  1  Do not feel it is necessary for patient to have MRI brain at this time  Will have patient follow-up with our stroke clinically as an outpatient and consider follow-up CT brain  Will send communication to clerical team to set up outpatient appointment  2  Continued supportive care as per primary service  Discussed with attending neurologist and with Radiology  Neurology will remain available to advise and assist with outpatient follow-up         Subjective:   Patient encountered with CPAP machine on this morning  Did not voice any complaints and was looking forward to eating his breakfast   Dr Krystyna Mae and I were able to review the patient's brain CTs with Radiology  It was felt that the hypodensities in the bilateral parietal occipital areas represented areas of ischemia and not likely abscess or septic emboli  The areas appeared similar when comparing the scans from 02/10/2019 and 01/23/2019  ROS:12 point review of systems reviewed and negative except as indicated above  Vitals: Blood pressure 138/80, pulse 84, temperature 98 8 °F (37 1 °C), temperature source Axillary, resp  rate 22, height 5' 9" (1 753 m), weight (!) 161 kg (354 lb 8 oz), SpO2 91 %  ,Body mass index is 52 35 kg/m²  Physical Exam:   General:  Patient is alert and pleasantly interactive  Appears to be in no acute distress  HEENT:  Head normocephalic  Eyes anicteric    Heart:  Rhythm irregular  Neurologic:  Alert  Accurate with left-right orientation  PERRLA  Bilateral cataract lenses noted  Patient had difficulty following instructions with EOM testing but did appear to have intact EOMs  Symmetrical smile  Tongue and palate midline  Gait not assessed for safety  Able to spontaneously move all 4 extremities but did appear to have increased weakness left upper and left lower extremities as compared to right upper and lower extremities  Sensory testing intact to light touch in the bilateral upper extremities but not in the lower extremities  Reflexes 2 throughout the right upper extremity, 1 throughout the left upper extremity, and trace to absent at the bilateral knees and ankles  Toe response on the right was downgoing on the left appeared to be upgoing      Lab, Imaging and other studies:   CBC:   Results from last 7 days   Lab Units 02/12/19  0501 02/11/19  0525 02/10/19  0456   WBC Thousand/uL 10 13 11 42* 11 02*   RBC Million/uL 2 82* 2 83* 2 81*   HEMOGLOBIN g/dL 7 4* 7 7* 7 5*   HEMATOCRIT % 24 3* 24 7* 24 7*   MCV fL 86 87 88   PLATELETS Thousands/uL 212 215 234   , BMP/CMP:   Results from last 7 days   Lab Units 02/12/19  0501 02/11/19  0525 02/09/19  1113 02/09/19  0548   SODIUM mmol/L 132* 131* 130* 133*   POTASSIUM mmol/L 4 6 5 1 4 2 4 2   CHLORIDE mmol/L 94* 95* 95* 95*   CO2 mmol/L 24 22 25 24   BUN mg/dL 86* 118* 82* 73*   CREATININE mg/dL 6 64* 7 87* 5 66* 5 29*   CALCIUM mg/dL 7 7* 7 9* 8 1* 8 0*   AST U/L  --   --   --  23   ALT U/L  --   --   --  10*   ALK PHOS U/L  --   --   --  71   EGFR ml/min/1 73sq m 8 7 10 11   , Coagulation:   Results from last 7 days   Lab Units 02/12/19  0501   INR  3 93*     VTE Prophylaxis: Sequential compression device (Venodyne)  and Reason for no pharmacologic prophylaxis Supratherapeutic INR

## 2019-02-12 NOTE — PROGRESS NOTES
Cardiology Progress Note - Kofi Heath 60 y o  male MRN: [id number]    Unit/Bed#: [facility] 507-01 Encounter: [id number]      Assessment:  1  Heart failure with reduced EF, LVEF 35-40%, LVIDd 6 5 cm  · [facility] - 7/2014 - normal coronaries  2  New-onset atrial fibrillation  3  Sepsis, possibly from skin ulcer / pneumonia  · Shock - resolved  · Blood culture initially 6/6+ for staph aureus, last blood cx 1/27/19 - 2/2 negative  · Sputum Cx - Staph aureus  4  MSSA bacteremia  5  Bioprosthetic AVR #25 (7/2014) with mild bioprosthetic AS  6  NSTEMI type 2  · Troponins peaked at >40, ECG - old LBBB  7  Acute renal failure/ATN  · new dialysis initiation this admission, currently dependent  8  Anemia, Hb 7 4  9  Morbid Obesity, Body mass index is 52 35 kg/m²  Outpatient cardiologist: Jordan Saxena  Heart failure with reduced EF, LVEF 35-40%  · EF drop from 50 to 35% in the setting of septic shock + atrial fibrillation  · Was on milrinone for a few days, but now remains off it for over 2 weeks  · Hemodynamically stable  · Still very volume overloaded  · Has been anuric, and completely dialysis dependent  · Got a trial dose of torsemide 80 today, to see if he has any response now  · Continue metoprolol 75 bid, amiodarone 200  · Repeat limited echo to evaluate change in LV function    Atrial fibrillation, new onset  · Occurred in setting of sepsis  · Now back in NSR  · On amiodarone 200, metoprolol 75 bid  · On coumadin for AC  · INR supratherapeutic since 2/9 - coumadin dose held        Subjective:   No significant events overnight    Has been mostly bed bound    Review of Systems  No c/o chest pain, No c/o palpitations, No c/o shortness of breath, No c/o dizziness or light-headedness, No c/o abdominal pain, no c/o nausea/vomitting  All other systems negative    Telemetry Review: No significant arrhythmias seen on telemetry review    Objective:   Vitals: Blood pressure 138/80, pulse 84, temperature 98 8 °F (37 1 °C), temperature source Axillary, resp  rate 22, height 5' 9" (1 753 m), weight (!) 161 kg (354 lb 8 oz), SpO2 91 % , Body mass index is 52 35 kg/m² ,   Orthostatic Blood Pressures      Most Recent Value   Blood Pressure  138/80 filed at 02/12/2019 0730   Patient Position - Orthostatic VS  Lying filed at 02/11/2019 9942         Systolic (23BUN), JVO:787 , Min:114 , PYW:712     Diastolic (59OVT), EYW:62, Min:51, Max:91    Wt Readings from Last 5 Encounters:   02/12/19 (!) 161 kg (354 lb 8 oz)   01/24/19 (!) 154 kg (339 lb 8 1 oz)   06/11/18 (!) 155 kg (341 lb)   04/11/18 (!) 155 kg (341 lb)   12/20/17 (!) 148 kg (325 lb 8 oz)     I/O       02/10 0701 - 02/11 0700 02/11 0701 - 02/12 0700 02/12 0701 - 02/13 0700    P  O  440 820 0    I V  (mL/kg)  300 (1 9)     IV Piggyback 50      Total Intake(mL/kg) 490 (3 5) 1120 (7) 0 (0)    Urine (mL/kg/hr) 0 (0) 0 (0)     Other  3300     Total Output 0 3300     Net +490 -2180 0                     Physical Exam    Constitutional:  Dicky Miami appears alert and oriented x 3, pleasant and cooperative  No acute distress   HEENT:    Normocephalic, atraumatic   Neck:  Supple, JVD elevated, obese   Lungs:  Clear to auscultation bilaterally, respirations unlabored    Chest Wall:  No tenderness or deformity    Heart::  Regular rate, Regular rhythm, S1 and S2 normal, no murmur, rub or gallop    Abdomen:  Soft, non-tender, morbidly obese   Neurological:  Awake, alert, oriented x3   Non-focal exam    Extremities:  3-4+ edema all 4 extremities, No calf tenderness         Laboratory Results:        CBC with diff:   Results from last 7 days   Lab Units 02/12/19  0501 02/11/19  0525 02/10/19  0456 02/09/19  0548 02/08/19  0525 02/07/19  0455 02/06/19  0458   WBC Thousand/uL 10 13 11 42* 11 02* 11 60* 13 04* 9 37 11 06*   HEMOGLOBIN g/dL 7 4* 7 7* 7 5* 7 5* 7 8* 7 6* 7 8*   HEMATOCRIT % 24 3* 24 7* 24 7* 25 0* 25 8* 24 8* 24 6*   MCV fL 86 87 88 87 88 87 86   PLATELETS Thousands/uL 212 215 234 234 260 191 172   MCH pg 26 2* 27 2 26 7* 26 1* 26 4* 26 7* 27 4   MCHC g/dL 30 5* 31 2* 30 4* 30 0* 30 2* 30 6* 31 7   RDW % 17 7* 17 6* 18 1* 17 9* 18 2* 18 0* 18 0*   MPV fL 11 0 11 6 12 0 11 9 11 8 12 8* 12 6   NRBC AUTO /100 WBCs 0 0 0 0 0 0 0         CMP:  Results from last 7 days   Lab Units 02/12/19  0501 02/11/19  0525 02/09/19  1113 02/09/19  0548 02/08/19  0525 02/07/19  0455 02/06/19  0458   POTASSIUM mmol/L 4 6 5 1 4 2 4 2 4 6 4 4 4 8   CHLORIDE mmol/L 94* 95* 95* 95* 94* 99* 98*   CO2 mmol/L 24 22 25 24 23 26 23   BUN mg/dL 86* 118* 82* 73* 87* 62* 79*   CREATININE mg/dL 6 64* 7 87* 5 66* 5 29* 5 84* 4 43* 5 33*   CALCIUM mg/dL 7 7* 7 9* 8 1* 8 0* 8 1* 7 7* 8 0*   AST U/L  --   --   --  23  --   --   --    ALT U/L  --   --   --  10*  --   --   --    ALK PHOS U/L  --   --   --  71  --   --   --    EGFR ml/min/1 73sq m 8 7 10 11 10 14 11         BMP:  Results from last 7 days   Lab Units 02/12/19  0501 02/11/19  0525 02/09/19  1113 02/09/19  0548 02/08/19  0525 02/07/19  0455 02/06/19  0458   POTASSIUM mmol/L 4 6 5 1 4 2 4 2 4 6 4 4 4 8   CHLORIDE mmol/L 94* 95* 95* 95* 94* 99* 98*   CO2 mmol/L 24 22 25 24 23 26 23   BUN mg/dL 86* 118* 82* 73* 87* 62* 79*   CREATININE mg/dL 6 64* 7 87* 5 66* 5 29* 5 84* 4 43* 5 33*   CALCIUM mg/dL 7 7* 7 9* 8 1* 8 0* 8 1* 7 7* 8 0*       BNP: No results for input(s): BNP in the last 72 hours      Magnesium:   Results from last 7 days   Lab Units 02/12/19  0501 02/11/19  0525 02/10/19  0456 02/09/19  0548 02/08/19  0525 02/07/19  0455 02/06/19  0458   MAGNESIUM mg/dL 2 7* 2 8* 2 8* 2 6 2 8* 2 5 2 5       Coags:   Results from last 7 days   Lab Units 02/12/19  0501 02/11/19  0525 02/10/19  0456 02/09/19  0548 02/08/19  2248 02/08/19  1432 02/08/19  0945 02/08/19  0525 02/07/19  0455 02/06/19  0458 02/05/19  1128   PTT seconds  --   --   --   --  79* 81*  --  110* 87* 78* 77*   INR  3 93* 5 71* 3 61* 1 68*  --   --  1 34*  --   --   --   --        TSH:        Hemoglobin A1C Lipid Profile:       Meds/Allergies   all current active meds have been reviewed and current meds:   Current Facility-Administered Medications   Medication Dose Route Frequency    acetaminophen (TYLENOL) tablet 650 mg  650 mg Oral Q6H PRN    amiodarone tablet 200 mg  200 mg Oral Daily With Breakfast    bisacodyl (DULCOLAX) rectal suppository 10 mg  10 mg Rectal Daily PRN    calcium acetate (PHOSLO) capsule 1,334 mg  1,334 mg Oral BID    ceFAZolin (ANCEF) 1 g in sodium chloride 0 9% 50 ml IVPB  1,000 mg Intravenous Q24H    guaiFENesin (MUCINEX) 12 hr tablet 600 mg  600 mg Oral Q12H ARACELIS    HYDROmorphone (DILAUDID) injection 1 mg  1 mg Intravenous Q3H PRN    ipratropium (ATROVENT) 0 02 % inhalation solution 0 5 mg  0 5 mg Nebulization Q6H PRN    levalbuterol (XOPENEX) inhalation solution 1 25 mg  1 25 mg Nebulization Q6H PRN    metoprolol (LOPRESSOR) injection 5 mg  5 mg Intravenous Q6H PRN    metoprolol tartrate (LOPRESSOR) tablet 75 mg  75 mg Oral Q12H [facility]    nystatin (MYCOSTATIN) powder   Topical BID    oxyCODONE (ROXICODONE) immediate release tablet 10 mg  10 mg Oral Q4H PRN    polyethylene glycol (MIRALAX) packet 17 g  17 g Oral Daily    polyvinyl alcohol (LIQUIFILM TEARS) 1 4 % ophthalmic solution 1 drop  1 drop Both Eyes Q3H PRN    QUEtiapine (SEROquel) tablet 25 mg  25 mg Oral HS    senna-docusate sodium (SENOKOT S) 8 6-50 mg per tablet 1 tablet  1 tablet Oral BID     Medications Prior to Admission   Medication    amLODIPine (NORVASC) 5 mg tablet    ascorbic acid (VITAMIN C) 500 MG tablet    aspirin (ECOTRIN) 325 mg EC tablet    fluticasone (FLONASE) 50 mcg/act nasal spray    loratadine (CLARITIN) 10 mg tablet    metoprolol tartrate (LOPRESSOR) 100 mg tablet    potassium chloride (K-DUR,KLOR-CON) 20 mEq tablet    pravastatin (PRAVACHOL) 40 mg tablet    torsemide (DEMADEX) 20 mg tablet            Cardiac testing:   Results for orders placed during the hospital encounter of 01/23/19 Echo complete with contrast if indicated    Narrative [address] [facility] [address] [facility] [address]  [phone]    Transthoracic Echocardiogram  2D, Doppler, and Color Doppler    Study date:  2019    Patient: Sindy De La Paz  MR number: [id number]  Account number: [de-identified]  : 1960  Age: 60 years  Gender: Male  Status: Inpatient  Location: Bedside  Height: 72 in  Weight: 339 lb  BP: 89/ 54 mmHg    Indications: chest pain    Diagnoses: R07 9 - Chest pain, unspecified    Sonographer:  MADHU Avila  Primary Physician:  Ovidio Dozier DO  Referring Physician:  Genoveva Lozano DO  Group:  [id number]  Interpreting Physician:  Cristine Bañuelos DO    SUMMARY    PROCEDURE INFORMATION:  This was a technically difficult study despite the administration of echo contrast     LEFT VENTRICLE:  Systolic function was markedly reduced  Ejection fraction was estimated to be 30 %  There was severe diffuse hypokinesis with no obvious identifiable regional variations  Wall thickness was moderately increased  Concentric hypertrophy was present  VENTRICULAR SEPTUM:  There was dyssynergic motion  RIGHT VENTRICLE:  The ventricle was mildly dilated  Systolic function was moderately to markedly reduced  LEFT ATRIUM:  The atrium was mildly dilated  RIGHT ATRIUM:  The atrium was mildly dilated  AORTIC VALVE:  A bioprosthesis was present  It was not well visualized  Mean transvalvular gradient 13 mmHg  TRICUSPID VALVE:  There was mild regurgitation  PERICARDIUM:  A small, partially loculated pericardial effusion was identified along the left ventricular free wall  HISTORY: PRIOR HISTORY: Aortic valve prosthesis    PROCEDURE: The procedure was performed at the bedside  This was a routine study  The transthoracic approach was used  The study included complete 2D imaging, complete spectral Doppler, and color Doppler   The heart rate was 80 bpm, at the  start of the study  Intravenous contrast (Definity solution [1 3 ml Definity/8 7ml normal saline solution]) was administered  Intravenous contrast (Definity solution [1 3 ml Definity/8 7ml normal saline solution]) was administered to  opacify the left ventricle and enhance Doppler signals  Echocardiographic views were limited due to low windows and patient on mechanical ventilator  This was a technically difficult study despite the administration of echo contrast     LEFT VENTRICLE: Size was normal  Systolic function was markedly reduced  Ejection fraction was estimated to be 30 %  There was severe diffuse hypokinesis with no obvious identifiable regional variations  Wall thickness was moderately  increased  Concentric hypertrophy was present  DOPPLER: Normal sinus rhythm was absent  The study was not technically sufficient to allow evaluation of LV diastolic function  VENTRICULAR SEPTUM: There was dyssynergic motion  RIGHT VENTRICLE: The ventricle was mildly dilated  Systolic function was moderately to markedly reduced  LEFT ATRIUM: The atrium was mildly dilated  RIGHT ATRIUM: The atrium was mildly dilated  MITRAL VALVE: Not well visualized  DOPPLER: The transmitral velocity was within the normal range  There was no evidence for stenosis  There was no significant regurgitation  AORTIC VALVE: A bioprosthesis was present  It was not well visualized  Mean transvalvular gradient 13 mmHg  DOPPLER: There was no significant regurgitation  TRICUSPID VALVE: The valve structure was normal  There was normal leaflet separation  DOPPLER: The transtricuspid velocity was within the normal range  There was no evidence for stenosis  There was mild regurgitation  The tricuspid jet  envelope definition was inadequate for estimation of RV systolic pressure  PULMONIC VALVE: Leaflets exhibited normal thickness, no calcification, and normal cuspal separation   DOPPLER: The transpulmonic velocity was within the normal range  There was no significant regurgitation  PERICARDIUM: A small, partially loculated pericardial effusion was identified along the left ventricular free wall  The pericardium was normal in appearance  AORTA: The root exhibited normal size  SYSTEM MEASUREMENT TABLES    2D  %FS: 15 83 %  Ao Diam: 3 09 cm  EDV(Teich): 221 88 ml  EF(Teich): 32 54 %  ESV(Teich): 149 69 ml  IVSd: 1 59 cm  LA Diam: 5 18 cm  LVIDd: 6 58 cm  LVIDs: 5 54 cm  LVPWd: 1 43 cm  SV(Teich): 72 19 ml    CW  AV Env  Ti: 233 56 ms  AV VTI: 49 77 cm  AV Vmax: 2 64 m/s  AV Vmax: 2 67 m/s  AV Vmean: 2 13 m/s  AV maxP 95 mmHg  AV maxP 53 mmHg  AV meanP 73 mmHg    PW  LVOT Env  Ti: 215 22 ms  LVOT VTI: 11 41 cm  LVOT Vmax: 0 59 m/s  LVOT Vmax: 0 7 m/s  LVOT Vmean: 0 52 m/s  LVOT maxP 78 mmHg  LVOT maxP mmHg  LVOT meanP 24 mmHg  MV E Deep: 1 01 m/s    [facility]    Prepared and electronically signed by    Janak Jasmine DO  Signed 2019 10:14:55       Results for orders placed during the hospital encounter of 19   CHON    Narrative [facility]  [address]  [phone]    Transesophageal Echocardiogram  2D, Doppler, and Color Doppler    Study date:  2019    Patient: Delmi House  MR number: [id number]  Account number: [de-identified]  : 1960  Age: 60 years  Gender: Male  Status: Inpatient  Location: Bedside  Height: 69 in  Weight: 319 lb  BP: 101/ 54 mmHg    Indications: Bacteremia  Diagnoses: R78 81 - Bacteremia    Sonographer:  Simi Aguero RDCS  Interpreting Physician:  Eulogio Khoury DO  Primary Physician:  Biagio Closs, DO  Referring Physician:  Cheo Arana DO  Group:  [id number]  Cardiology Fellow:  Yaa Frazier MD    IMPRESSIONS:  There was no echocardiographic evidence for valvular vegetation      SUMMARY    LEFT VENTRICLE:  Systolic function was moderately reduced  Ejection fraction was estimated to be 40 %  There was moderate diffuse hypokinesis  Wall thickness was mildly increased  There was mild concentric hypertrophy  RIGHT VENTRICLE:  The size was normal   Systolic function was mildly reduced  LEFT ATRIUM:  The atrium was mildly dilated  ATRIAL SEPTUM:  There was a small patent foramen ovale with left to right shunt identified by color Doppler  RIGHT ATRIUM:  The atrium was mildly dilated  MITRAL VALVE:  There was trace regurgitation  There was no echocardiographic evidence of vegetation  AORTIC VALVE:  A bioprosthesis was present  It exhibited normal function  There was no echocardiographic evidence of vegetation  TRICUSPID VALVE:  There was mild regurgitation  There was no echocardiographic evidence of vegetation  HISTORY: PRIOR HISTORY: Aortic valve replacement, NSTEMI, Cardiomyopathy, Acute kidney injury  PROCEDURE: The procedure was performed at the bedside  This was a routine study  The risks and alternatives of the procedure were explained to the patient's next of kin and informed consent was obtained  The transesophageal approach was  used  The study included complete 2D imaging, complete spectral Doppler, and color Doppler  The heart rate was 113 bpm, at the start of the study  An adult omniplane probe was inserted by the cardiology fellow under direct supervision of  the attending cardiologist  Intubated with ease  One intubation attempt(s)  There was no blood detected on the probe  Image quality was adequate  There were no complications during the procedure  MEDICATIONS: Sedation administered by  bedside nurse  LEFT VENTRICLE: Size was normal  Systolic function was moderately reduced  Ejection fraction was estimated to be 40 %  There was moderate diffuse hypokinesis  Wall thickness was mildly increased  There was mild concentric hypertrophy    DOPPLER: The study was not technically sufficient to allow evaluation of LV diastolic function  RIGHT VENTRICLE: The size was normal  Systolic function was mildly reduced  Wall thickness was normal     LEFT ATRIUM: The atrium was mildly dilated  No thrombus was identified  APPENDAGE: The appendage was small  No thrombus was identified  DOPPLER: The function was normal (normal emptying velocity)  ATRIAL SEPTUM: There was a small patent foramen ovale with left to right shunt identified by color Doppler  RIGHT ATRIUM: The atrium was mildly dilated  No thrombus was identified  MITRAL VALVE: Valve structure was normal  There was normal leaflet separation  There was no echocardiographic evidence of vegetation  DOPPLER: There was trace regurgitation  AORTIC VALVE: A bioprosthesis was present  It exhibited normal function  There was no echocardiographic evidence of vegetation  TRICUSPID VALVE: The valve structure was normal  There was normal leaflet separation  There was no echocardiographic evidence of vegetation  DOPPLER: There was mild regurgitation  PULMONIC VALVE: Leaflets exhibited normal thickness, no calcification, and normal cuspal separation  There was no echocardiographic evidence of vegetation  DOPPLER: There was no significant regurgitation  PERICARDIUM: There was no pericardial effusion seen in the images obtained  AORTA: The root exhibited normal size  There was no atheroma  There was no evidence for dissection  There was no evidence for aneurysm  [facility]    Prepared and electronically signed by    Jayne Jasso DO  Signed 25-Jan-2019 14:01:14       No results found for this or any previous visit  No results found for this or any previous visit  Counseling / Coordination of Care  Total floor / unit time spent today 30 minutes    Greater than 50% of total time was spent with the patient and / or family counseling and / or coordination of care  A description of the counseling / coordination of care: Obtained history, performed physical examination, discussed laboratory and imaging results, and explained further plan of care  Carlene Hurtado

## 2019-02-12 NOTE — PLAN OF CARE
Problem: Nutrition/Hydration-ADULT  Goal: Nutrient/Hydration intake appropriate for improving, restoring or maintaining nutritional needs  Description  Monitor and assess patient's nutrition/hydration status for malnutrition (ex- brittle hair, bruises, dry skin, pale skin and conjunctiva, muscle wasting, smooth red tongue, and disorientation)  Collaborate with interdisciplinary team and initiate plan and interventions as ordered  Monitor patient's weight and dietary intake as ordered or per policy  Utilize nutrition screening tool and intervene per policy  Determine patient's food preferences and provide high-protein, high-caloric foods as appropriate       INTERVENTIONS:  - Monitor oral intake, urinary output, labs, and treatment plans  - Assess nutrition and hydration status and recommend course of action  - Evaluate amount of meals eaten  - Assist patient with eating if necessary   - Allow adequate time for meals  - Recommend/ encourage appropriate diets, oral nutritional supplements, and vitamin/mineral supplements  - Order, calculate, and assess calorie counts as needed  - Provide specific nutrition/hydration education as appropriate  - Include patient/family/caregiver in decisions related to nutrition   Outcome: Progressing

## 2019-02-12 NOTE — PROGRESS NOTES
[address] Internal Medicine Progress Note  Patient: Yue Martínez 60 y o  male   MRN: [id number]  PCP: Bairon Christianson DO  Unit/Bed#: [facility] 507-01 Encounter: [id number]  Date Of Visit: 02/12/19    Assessment:    Active Problems:    Increased BMI    NSTEMI (non-ST elevated myocardial infarction) ([facility] )    ESRD (end stage renal disease) ([facility] )    Stress-induced cardiomyopathy    Acute respiratory failure with hypoxia ([facility] )    History of aortic valve replacement with bioprosthetic valve    Atrial fibrillation (HCC)    Staphylococcus aureus bacteremia    Transaminitis    Thrombocytopenia (HCC)    Anemia    Leukocytosis      Plan:    1  MSSA bacteremia, negative CHON, negative repeat blood culture, per ID, plan for 6 weeks of antibiotic through 3/10  2  Recent shock, likely multifactorial,  cardiogenic/septic, resolved  3  PATRICK secondary to ATN, no sign of renal recovery,  now HD dependent on MWF, nephrology is following  4  Acute systolic CHF/cardiomyopathy with EF 30%, cardiology is following, on Lopressor   5  S/p  bio AVR, negative CHON for vegetation  6  Acute hypoxic respiratory failure secondary to above, resolved status post extubation, continue supportive care  7  New onset AFib with supratherapeutic INR, on amiodarone,  Coumadin on hold, continue to monitor  8  Non MI- elevated troponin  9  Toxic metabolic encephalopathy with waxing and waning,  abnormal head CT scan, Neurology is following  10  Anemia, check anemia panel, monitor   11  Morbid obesity          VTE Pharmacologic Prophylaxis:   Pharmacologic: Warfarin (Coumadin)  Mechanical VTE Prophylaxis in Place: Yes    Patient Centered Rounds: I have performed bedside rounds with nursing staff today  Discussions with Specialists or Other Care Team Provider:     Education and Discussions with Family / Patient:  Patient    Time Spent for Care: 30 minutes  More than 50% of total time spent on counseling and coordination of care as described above      Current Length of Stay: 19 day(s)    Current Patient Status: Inpatient   Certification Statement: The patient will continue to require additional inpatient hospital stay due to Management of PATRICK    Discharge Plan / Estimated Discharge Date: not ready yet    Code Status: Level 1 - Full Code      Subjective:   Patient seen and examined  Comfortable in bed  Somewhat confused  Denied pain    Objective:     Vitals:   Temp (24hrs), Av 3 °F (36 8 °C), Min:97 9 °F (36 6 °C), Max:99 °F (37 2 °C)    Temp:  [97 9 °F (36 6 °C)-99 °F (37 2 °C)] 98 8 °F (37 1 °C)  HR:  [] 84  Resp:  [15-23] 22  BP: (114-143)/(51-91) 138/80  SpO2:  [91 %-100 %] 91 %  Body mass index is 52 35 kg/m²  Input and Output Summary (last 24 hours): Intake/Output Summary (Last 24 hours) at 2019 0930  Last data filed at 2019 0709  Gross per 24 hour   Intake 900 ml   Output 3300 ml   Net -2400 ml       Physical Exam:     Physical Exam  Patient is awake in no acute distress  Nurse aide is feeding him  Lung with decreased breath sounds bilateral  Heart positive S1-S2 irregular,  no murmur  Abdomen soft obese distended nontender  Lower extremities edema    Additional Data:     Labs:    Results from last 7 days   Lab Units 19  0501   WBC Thousand/uL 10 13   HEMOGLOBIN g/dL 7 4*   HEMATOCRIT % 24 3*   PLATELETS Thousands/uL 212   NEUTROS PCT % 86*   LYMPHS PCT % 4*   MONOS PCT % 8   EOS PCT % 1     Results from last 7 days   Lab Units 19  0501  19  0548   POTASSIUM mmol/L 4 6   < > 4 2   CHLORIDE mmol/L 94*   < > 95*   CO2 mmol/L 24   < > 24   BUN mg/dL 86*   < > 73*   CREATININE mg/dL 6 64*   < > 5 29*   CALCIUM mg/dL 7 7*   < > 8 0*   ALK PHOS U/L  --   --  71   ALT U/L  --   --  10*   AST U/L  --   --  23    < > = values in this interval not displayed  Results from last 7 days   Lab Units 19  0501   INR  3 93*       * I Have Reviewed All Lab Data Listed Above  * Additional Pertinent Lab Tests Reviewed:  All Labs For Current Hospital Admission Reviewed    Imaging:    Imaging Reports Reviewed Today Include:   Imaging Personally Reviewed by Myself Includes:     Recent Cultures (last 7 days):           Last 24 Hours Medication List:     Current Facility-Administered Medications:  acetaminophen 650 mg Oral Q6H PRN Ziyad Aguilar MD    amiodarone 200 mg Oral Daily With Breakfast Syd Botello MD    bisacodyl 10 mg Rectal Daily PRN Lele Borja PA-C    calcium acetate 1,334 mg Oral BID Emily Spencer MD    cefazolin 1,000 mg Intravenous Q24H Ziyad Aguilar MD Last Rate: 1,000 mg (02/11/19 1706)   guaiFENesin 600 mg Oral Q12H [address] Olive Umanzor PA-C    HYDROmorphone 1 mg Intravenous Q3H PRN Lidya Martinez    ipratropium 0 5 mg Nebulization Q6H PRN Leisa Helms MD    levalbuterol 1 25 mg Nebulization Q6H PRN Leisa Helms MD    metoprolol 5 mg Intravenous Q6H PRN Savannah Oro DO    metoprolol tartrate 75 mg Oral Q12H [address] Syd Botello MD    nystatin  Topical BID Lele Borja PA-C    oxyCODONE 10 mg Oral Q4H PRN Ziyad Aguilar MD    polyethylene glycol 17 g Oral Daily KATHY Mathias    polyvinyl alcohol 1 drop Both Eyes Q3H PRN Rj Evangelista PA-C    QUEtiapine 25 mg Oral HS Rj Evangelista PA-C    senna 1 tablet Oral HS Ziyad Aguilar MD         Today, Patient Was Seen By: Adry Teran DO    ** Please Note: This note has been constructed using a voice recognition system   **

## 2019-02-12 NOTE — PROGRESS NOTES
Progress Note - Infectious Disease   Yue Martínez 60 y o  male MRN: [id number]  Unit/Bed#: [facility] 507-01 Encounter: [id number]      Impression/Plan:  1  Severe sepsis/septic shock   Fever, tachycardia, leukocytosis, hypotension   Also with component of cardiogenic shock  Likely precipitated by MSSA bacteremia   No other clear evidence of acute infection identified   Fevers, procalcitonin improved  Leukocytosis normalized today     -antibiotic plan as below   -monitor temperatures and hemodynamics  -ongoing supportive care as per primary     2  MSSA bacteremia   Possibility of endocarditis considered in the setting of bioprosthetic AVR   CHON with no evidence of valvular vegetations   Initial portal of entry may have been related to multiple upper back wounds   CT chest/abdomen/pelvis with no other evidence of acute infection   Possible from pneumonia as sputum culture was positive for MSSA   Bacteremia eventually cleared   Podiatry evaluated patient's feet and no acute issues  Neurology evaluation noted with patient's recent changes in mental status, imaging abnormalities felt to be ischemic and similar compared to prior studies  No plan for MRI      -continue with renally dosed IV cefazolin  -neurology evaluation appreciated  -ongoing follow-up by Cardiology  -patient will ultimately require prolonged course of IV antibiotics of at least 6 weeks through 3/10  -antibiotics will likely be dosed with hemodialysis at discharge     3  History of bioprosthetic AVR   Secondary to degenerative AS   Placed in July 2014  CHON with no evidence of endocarditis  Ongoing follow-up by Cardiology      4  Acute kidney injury   Likely ischemic/septic ATN    Patient remains on hemodialysis   He is status post PermCath placement      -antibiotics dosed for renal dysfunction  -ongoing follow-up by Nephrology  -antibiotics can be dosed with hemodialysis at discharge     5   Acute hypoxic respiratory failure   Likely in the setting of septic shock as well as acute systolic cardiomyopathy/volume overload   Recent CT chest with no evidence to suggest pulmonary infection   Patient remains  well on nasal cannula      -monitor secretions  -monitor O2 requirements  -ongoing supportive care as per primary     6  Leukocytosis:  Patient continues to have low level leukocytosis though he remains otherwise hemodynamically stable  White blood cell count normalized on labs today  Patient remains otherwise hemodynamically stable and neurology evaluation noted above     -continue to monitor fever curve/vitals  -continue to trend CBC  -continue patient on Ancef at this time  -if patient spikes fever please send repeat cultures  -ongoing follow-up by Cardiology     Above plan discussed in detail with the patient      ID consult service will continue to follow  Antibiotics:  Ancef    24 hour events:  No acute events noted overnight on chart review  Patient is currently afebrile  White blood cell count 10 1  No new micro data  MRI pending  Patient's other vitals are stable    Subjective:  Patient currently denies having any nausea, vomiting, chest pain or shortness of breath  He is oriented to person and to place but has difficulty with time  He is able to recall for me that he had penicillin allergy he believes more than 10 years ago that led to rash  He otherwise denies having any new symptoms  He does report pain all over his body with movement  Objective:  Vitals:  Temp:  [97 9 °F (36 6 °C)-99 °F (37 2 °C)] 98 8 °F (37 1 °C)  HR:  [] 69  Resp:  [15-23] 15  BP: (114-143)/(51-91) 138/80  SpO2:  [94 %-100 %] 100 %  Temp (24hrs), Av 3 °F (36 8 °C), Min:97 9 °F (36 6 °C), Max:99 °F (37 2 °C)  Current: Temperature: 98 8 °F (37 1 °C)    Physical Exam:   General Appearance:  Alert, interactive, nontoxic, no acute distress  Throat: Oropharynx moist without lesions      Lungs:   Decreased breath sounds throughout all lung fields anteriorly; no wheezes, rhonchi or rales; respirations unlabored   Heart:  Irregular; no murmur, rub or gallop though distant heart sounds   Abdomen:   Soft, non-tender, non-distended, positive bowel sounds  Extremities: No clubbing, cyanosis; patient has ongoing edema in all of his extremities  Skin: No new rashes or lesions on exposed skin  No new draining wounds noted on exposed skin  Per nursing he has no new lesions on his back or significant breakdown  Labs, Imaging, & Other studies:   All pertinent labs and imaging studies were personally reviewed  Results from last 7 days   Lab Units 02/12/19  0501 02/11/19  0525 02/10/19  0456   WBC Thousand/uL 10 13 11 42* 11 02*   HEMOGLOBIN g/dL 7 4* 7 7* 7 5*   PLATELETS Thousands/uL 212 215 234     Results from last 7 days   Lab Units 02/12/19  0501  02/09/19  0548   POTASSIUM mmol/L 4 6   < > 4 2   CHLORIDE mmol/L 94*   < > 95*   CO2 mmol/L 24   < > 24   BUN mg/dL 86*   < > 73*   CREATININE mg/dL 6 64*   < > 5 29*   EGFR ml/min/1 73sq m 8   < > 11   CALCIUM mg/dL 7 7*   < > 8 0*   AST U/L  --   --  23   ALT U/L  --   --  10*   ALK PHOS U/L  --   --  71    < > = values in this interval not displayed

## 2019-02-12 NOTE — PROGRESS NOTES
NEPHROLOGY PROGRESS NOTE   Greta Nolan 60 y o  male MRN: [id number]  Unit/Bed#: [facility] 507-01 Encounter: [id number]      ASSESSMENT/PLAN:  1  Acute kidney injury:  Most likely due to ATN from shock    -currently hemodialysis dependent as he is volume overloaded and anuric   -continue dialysis Monday, Wednesday, Friday  2  Cardiogenic and septic shock  3  MSSA bacteremia:  On IV Ancef for 6 weeks (last dose 3/10)   4  Anemia of CKD  5  Hyperphosphatemia:  PhosLo was increased to 3 tablets t i d  Yesterday  6  Cardiomyopathy with ejection fraction 30%: will continue to UF with dialysis     -continue low sodium diet and fluid restriction  7  A fib with history AVR         SUBJECTIVE:  Feeling ok today  Complained of diarrhea overnight  Also feeling thirsty but on fluid restriction       OBJECTIVE:  Current Weight: Weight - Scale: (!) 161 kg (354 lb 8 oz)  Vitals:    02/12/19 0921   BP:    Pulse: 84   Resp: 22   Temp:    SpO2: 91%       Intake/Output Summary (Last 24 hours) at 2/12/2019 1166  Last data filed at 2/12/2019 4660  Gross per 24 hour   Intake 900 ml   Output 3300 ml   Net -2400 ml     General: no acute distress   Skin: no rash   Eyes: sclera anicteric   ENT: moist mucous membranes   Neck: supple, symmetric   Chest: decreased breath sounds    CVS: regular rate and rhythm  Abdomen: obese, non-tender   Extremities: bilateral edema   Neuro: awake and alert  Psych: appropriate affect     Medications:  Scheduled Meds:  Current Facility-Administered Medications:  acetaminophen 650 mg Oral Q6H PRN Aylin Rosado MD    amiodarone 200 mg Oral Daily With Breakfast Chelsea Guillen MD    bisacodyl 10 mg Rectal Daily PRN Sara Blanton PA-C    calcium acetate 1,334 mg Oral BID Beny Villareal MD    cefazolin 1,000 mg Intravenous Q24H Aylin Rosado MD Last Rate: 1,000 mg (02/11/19 1706)   guaiFENesin 600 mg Oral Q12H [address] Olive Umanzor PA-C    HYDROmorphone 1 mg Intravenous Q3H PRN Asaf Meneses ipratropium 0 5 mg Nebulization Q6H PRN Ami Griffith MD    levalbuterol 1 25 mg Nebulization Q6H PRN Ami Griffith MD    metoprolol 5 mg Intravenous Q6H PRN Miguelito Mayers DO    metoprolol tartrate 75 mg Oral Q12H [address] Raquel Avendaño MD    nystatin  Topical BID Vita Raines PA-C    oxyCODONE 10 mg Oral Q4H PRN Grabiel Niño MD    polyethylene glycol 17 g Oral Daily KATHY Mathias    polyvinyl alcohol 1 drop Both Eyes Q3H PRN Angelica Doll PA-C    QUEtiapine 25 mg Oral HS Angelica Doll PA-C    senna 1 tablet Oral HS Grabiel Niño MD        PRN Meds:   acetaminophen    bisacodyl    HYDROmorphone    ipratropium    levalbuterol    metoprolol    oxyCODONE    polyvinyl alcohol    Laboratory Results:  Results from last 7 days   Lab Units 02/12/19  0501 02/11/19  0525 02/10/19  0456 02/09/19  1113 02/09/19  0548   WBC Thousand/uL 10 13 11 42* 11 02*  --  11 60*   HEMOGLOBIN g/dL 7 4* 7 7* 7 5*  --  7 5*   HEMATOCRIT % 24 3* 24 7* 24 7*  --  25 0*   PLATELETS Thousands/uL 212 215 234  --  234   POTASSIUM mmol/L 4 6 5 1  --  4 2 4 2   CHLORIDE mmol/L 94* 95*  --  95* 95*   CO2 mmol/L 24 22  --  25 24   BUN mg/dL 86* 118*  --  82* 73*   CREATININE mg/dL 6 64* 7 87*  --  5 66* 5 29*   CALCIUM mg/dL 7 7* 7 9*  --  8 1* 8 0*   MAGNESIUM mg/dL 2 7* 2 8* 2 8*  --  2 6   PHOSPHORUS mg/dL 8 9* 10 6* 8 9*  --  8 6*   ALK PHOS U/L  --   --   --   --  71   ALT U/L  --   --   --   --  10*   AST U/L  --   --   --   --  23

## 2019-02-13 ENCOUNTER — APPOINTMENT (INPATIENT)
Dept: DIALYSIS | Facility: HOSPITAL | Age: 59
DRG: 871 | End: 2019-02-13
Payer: COMMERCIAL

## 2019-02-13 LAB
ANION GAP SERPL CALCULATED.3IONS-SCNC: 13 MMOL/L (ref 4–13)
BUN SERPL-MCNC: 101 MG/DL (ref 5–25)
CALCIUM SERPL-MCNC: 7.6 MG/DL (ref 8.3–10.1)
CHLORIDE SERPL-SCNC: 92 MMOL/L (ref 100–108)
CHOLEST SERPL-MCNC: 147 MG/DL (ref 50–200)
CO2 SERPL-SCNC: 22 MMOL/L (ref 21–32)
CREAT SERPL-MCNC: 7.74 MG/DL (ref 0.6–1.3)
EST. AVERAGE GLUCOSE BLD GHB EST-MCNC: 131 MG/DL
FERRITIN SERPL-MCNC: 107 NG/ML (ref 8–388)
GFR SERPL CREATININE-BSD FRML MDRD: 7 ML/MIN/1.73SQ M
GLUCOSE SERPL-MCNC: 85 MG/DL (ref 65–140)
HBA1C MFR BLD: 6.2 % (ref 4.2–6.3)
HDLC SERPL-MCNC: 44 MG/DL (ref 40–60)
INR PPP: 4.06 (ref 0.86–1.17)
IRON SATN MFR SERPL: 9 %
IRON SERPL-MCNC: 18 UG/DL (ref 65–175)
LDLC SERPL CALC-MCNC: 77 MG/DL (ref 0–100)
POTASSIUM SERPL-SCNC: 4.9 MMOL/L (ref 3.5–5.3)
PROTHROMBIN TIME: 39.4 SECONDS (ref 11.8–14.2)
SODIUM SERPL-SCNC: 127 MMOL/L (ref 136–145)
TIBC SERPL-MCNC: 194 UG/DL (ref 250–450)
TRIGL SERPL-MCNC: 129 MG/DL
TSH SERPL DL<=0.05 MIU/L-ACNC: 4 UIU/ML (ref 0.36–3.74)

## 2019-02-13 PROCEDURE — 99232 SBSQ HOSP IP/OBS MODERATE 35: CPT | Performed by: INTERNAL MEDICINE

## 2019-02-13 PROCEDURE — 82728 ASSAY OF FERRITIN: CPT | Performed by: INTERNAL MEDICINE

## 2019-02-13 PROCEDURE — 84443 ASSAY THYROID STIM HORMONE: CPT | Performed by: INTERNAL MEDICINE

## 2019-02-13 PROCEDURE — 94660 CPAP INITIATION&MGMT: CPT

## 2019-02-13 PROCEDURE — 80048 BASIC METABOLIC PNL TOTAL CA: CPT | Performed by: INTERNAL MEDICINE

## 2019-02-13 PROCEDURE — 80061 LIPID PANEL: CPT | Performed by: PHYSICIAN ASSISTANT

## 2019-02-13 PROCEDURE — G8981 BODY POS CURRENT STATUS: HCPCS

## 2019-02-13 PROCEDURE — 83036 HEMOGLOBIN GLYCOSYLATED A1C: CPT | Performed by: PHYSICIAN ASSISTANT

## 2019-02-13 PROCEDURE — G8982 BODY POS GOAL STATUS: HCPCS

## 2019-02-13 PROCEDURE — 83540 ASSAY OF IRON: CPT | Performed by: INTERNAL MEDICINE

## 2019-02-13 PROCEDURE — 83550 IRON BINDING TEST: CPT | Performed by: INTERNAL MEDICINE

## 2019-02-13 PROCEDURE — 85610 PROTHROMBIN TIME: CPT | Performed by: INTERNAL MEDICINE

## 2019-02-13 PROCEDURE — 97530 THERAPEUTIC ACTIVITIES: CPT

## 2019-02-13 PROCEDURE — 90935 HEMODIALYSIS ONE EVALUATION: CPT | Performed by: INTERNAL MEDICINE

## 2019-02-13 RX ORDER — ASPIRIN 81 MG/1
81 TABLET ORAL DAILY
Status: DISCONTINUED | OUTPATIENT
Start: 2019-02-13 | End: 2019-02-21

## 2019-02-13 RX ORDER — ATORVASTATIN CALCIUM 40 MG/1
40 TABLET, FILM COATED ORAL
Status: DISCONTINUED | OUTPATIENT
Start: 2019-02-13 | End: 2019-03-16 | Stop reason: HOSPADM

## 2019-02-13 RX ADMIN — POLYVINYL ALCOHOL 1 DROP: 14 SOLUTION/ DROPS OPHTHALMIC at 17:24

## 2019-02-13 RX ADMIN — CALCIUM ACETATE 1334 MG: 667 CAPSULE ORAL at 13:07

## 2019-02-13 RX ADMIN — NYSTATIN: 100000 POWDER TOPICAL at 13:09

## 2019-02-13 RX ADMIN — SENNOSIDES AND DOCUSATE SODIUM 1 TABLET: 8.6; 5 TABLET ORAL at 13:08

## 2019-02-13 RX ADMIN — AMIODARONE HYDROCHLORIDE 200 MG: 200 TABLET ORAL at 13:13

## 2019-02-13 RX ADMIN — GUAIFENESIN 600 MG: 600 TABLET, EXTENDED RELEASE ORAL at 21:42

## 2019-02-13 RX ADMIN — METOPROLOL TARTRATE 75 MG: 50 TABLET, FILM COATED ORAL at 21:42

## 2019-02-13 RX ADMIN — METOPROLOL TARTRATE 75 MG: 50 TABLET, FILM COATED ORAL at 13:08

## 2019-02-13 RX ADMIN — ATORVASTATIN CALCIUM 40 MG: 40 TABLET, FILM COATED ORAL at 17:24

## 2019-02-13 RX ADMIN — SENNOSIDES AND DOCUSATE SODIUM 1 TABLET: 8.6; 5 TABLET ORAL at 17:26

## 2019-02-13 RX ADMIN — NYSTATIN: 100000 POWDER TOPICAL at 17:25

## 2019-02-13 RX ADMIN — ACETAMINOPHEN 650 MG: 325 TABLET, FILM COATED ORAL at 17:32

## 2019-02-13 RX ADMIN — TORSEMIDE 80 MG: 20 TABLET ORAL at 13:08

## 2019-02-13 RX ADMIN — OXYCODONE HYDROCHLORIDE 10 MG: 10 TABLET ORAL at 20:22

## 2019-02-13 RX ADMIN — ACETAMINOPHEN 650 MG: 325 TABLET, FILM COATED ORAL at 13:09

## 2019-02-13 RX ADMIN — QUETIAPINE FUMARATE 25 MG: 25 TABLET ORAL at 21:42

## 2019-02-13 RX ADMIN — ASPIRIN 81 MG: 81 TABLET, COATED ORAL at 16:30

## 2019-02-13 RX ADMIN — CALCIUM ACETATE 1334 MG: 667 CAPSULE ORAL at 17:24

## 2019-02-13 RX ADMIN — GUAIFENESIN 600 MG: 600 TABLET, EXTENDED RELEASE ORAL at 13:08

## 2019-02-13 NOTE — UTILIZATION REVIEW
Continued Stay Review    Date:2-13-19    60year old male with blood cultures positive for staphylococcus aureus infection and abnormal Ct brain showing  Decreased attenuation in the bilateral parietal occipital areas  Likely representing ischemic infarcts caused by new onset atrial fibrillation and not infectious  Patient currently has bilateral weakness  Left greater than right and  Waxing and waning mental status         Remains volume overloaded        Vital Signs: /58   Pulse 57   Temp 98 6 °F (37 °C) (Axillary)   Resp 20   Ht 5' 9" (1 753 m)   Wt (!) 165 kg (364 lb 6 7 oz)   SpO2 93%   BMI 53 82 kg/m²      02/12/19 (!) 161 kg (354 lb 8 oz)   01/24/19 (!) 154 kg (339 lb 8 1 oz)   06/11/18 (!) 155 kg (341 lb)   04/11/18 (!) 155 kg (341 lb)   12/20/17 (!) 148 kg (325 lb 8 oz)       Assessment/Plan:    Atrial fibrillation:  Titrate metoprolol to increasing doses as tolerated then transition to succinate prior to dc  Heparin bridge to coumadin  Continue to hole milirinone   Keep cl >2 2   Continue amiodarone      Staph aureus  From skin ulcer or pneumonia   Continue iv antibiotics    Repeat blood cultures  Negative     Acute renal failure    Remains volume overloaded  - had trial dose of torsemide 80 mg  On 2-12   Monitor bmp   Continue dialysis     bipap during the night  Mostly bed bound   2Lo2nc    Renal diet   Telemetry  PT and OT continue to treat when patient medical status permits         Medications:     acetaminophen 650 mg Oral Q6H PRN   amiodarone 200 mg Oral Daily With Breakfast   bisacodyl 10 mg Rectal Daily PRN   calcium acetate 1,334 mg Oral BID   cefazolin 1,000 mg Intravenous Q24H   guaiFENesin 600 mg Oral Q12H ARACELIS   HYDROmorphone 1 mg Intravenous Q3H PRN   ipratropium 0 5 mg Nebulization Q6H PRN   levalbuterol 1 25 mg Nebulization Q6H PRN   metoprolol 5 mg Intravenous Q6H PRN   metoprolol tartrate 75 mg Oral Q12H ARACELIS   nystatin  Topical BID   oxyCODONE 10 mg Oral Q4H PRN polyethylene glycol 17 g Oral Daily   polyvinyl alcohol 1 drop Both Eyes Q3H PRN   QUEtiapine 25 mg Oral HS   senna-docusate sodium 1 tablet Oral BID   torsemide 80 mg Oral Daily       Discharge Plan: to be determined

## 2019-02-13 NOTE — PLAN OF CARE
Problem: Potential for Falls  Goal: Patient will remain free of falls  Description  INTERVENTIONS:  - Assess patient frequently for physical needs  -  Identify cognitive and physical deficits and behaviors that affect risk of falls  -  [place] fall precautions as indicated by assessment   - Educate patient/family on patient safety including physical limitations  - Instruct patient to call for assistance with activity based on assessment  - Modify environment to reduce risk of injury  - Consider OT/PT consult to assist with strengthening/mobility    Outcome: Progressing     Problem: Prexisting or High Potential for Compromised Skin Integrity  Goal: Skin integrity is maintained or improved  Description  INTERVENTIONS:  - Identify patients at risk for skin breakdown  - Assess and monitor skin integrity  - Assess and monitor nutrition and hydration status  - Monitor labs (i e  albumin)  - Assess for incontinence   - Turn and reposition patient  - Assist with mobility/ambulation  - Relieve pressure over bony prominences  - Avoid friction and shearing  - Provide appropriate hygiene as needed including keeping skin clean and dry  - Evaluate need for skin moisturizer/barrier cream  - Collaborate with interdisciplinary team (i e  Nutrition, Rehabilitation, etc )   - Patient/family teaching   Outcome: Progressing     Problem: Nutrition/Hydration-ADULT  Goal: Nutrient/Hydration intake appropriate for improving, restoring or maintaining nutritional needs  Description  Monitor and assess patient's nutrition/hydration status for malnutrition (ex- brittle hair, bruises, dry skin, pale skin and conjunctiva, muscle wasting, smooth red tongue, and disorientation)  Collaborate with interdisciplinary team and initiate plan and interventions as ordered  Monitor patient's weight and dietary intake as ordered or per policy  Utilize nutrition screening tool and intervene per policy   Determine patient's food preferences and provide high-protein, high-caloric foods as appropriate  INTERVENTIONS:  - Monitor oral intake, urinary output, labs, and treatment plans  - Assess nutrition and hydration status and recommend course of action  - Evaluate amount of meals eaten  - Assist patient with eating if necessary   - Allow adequate time for meals  - Recommend/ encourage appropriate diets, oral nutritional supplements, and vitamin/mineral supplements  - Order, calculate, and assess calorie counts as needed  - Recommend, monitor, and adjust tube feedings and TPN/PPN based on assessed needs  - Assess need for intravenous fluids  - Provide specific nutrition/hydration education as appropriate  - Include patient/family/caregiver in decisions related to nutrition   Outcome: Progressing     Problem: CARDIOVASCULAR - ADULT  Goal: Maintains optimal cardiac output and hemodynamic stability  Description  INTERVENTIONS:  - Monitor I/O, vital signs and rhythm  - Monitor for S/S and trends of decreased cardiac output i e  bleeding, hypotension  - Administer and titrate ordered vasoactive medications to optimize hemodynamic stability  - Assess quality of pulses, skin color and temperature  - Assess for signs of decreased coronary artery perfusion - ex   Angina  - Instruct patient to report change in severity of symptoms   Outcome: Progressing  Goal: Absence of cardiac dysrhythmias or at baseline rhythm  Description  INTERVENTIONS:  - Continuous cardiac monitoring, monitor vital signs, obtain 12 lead EKG if indicated  - Administer antiarrhythmic and heart rate control medications as ordered  - Monitor electrolytes and administer replacement therapy as ordered   Outcome: Progressing     Problem: METABOLIC, FLUID AND ELECTROLYTES - ADULT  Goal: Electrolytes maintained within normal limits  Description  INTERVENTIONS:  - Monitor labs and assess patient for signs and symptoms of electrolyte imbalances  - Administer electrolyte replacement as ordered  - Monitor response to electrolyte replacements, including repeat lab results as appropriate  - Instruct patient on fluid and nutrition as appropriate   Outcome: Progressing  Goal: Fluid balance maintained  Description  INTERVENTIONS:  - Monitor labs and assess for signs and symptoms of volume excess or deficit  - Monitor I/O and WT  - Instruct patient on fluid and nutrition as appropriate   Outcome: Progressing     Problem: PAIN - ADULT  Goal: Verbalizes/displays adequate comfort level or baseline comfort level  Description  Interventions:  - Encourage patient to monitor pain and request assistance  - Assess pain using appropriate pain scale  - Administer analgesics based on type and severity of pain and evaluate response  - Implement non-pharmacological measures as appropriate and evaluate response  - Consider cultural and social influences on pain and pain management  - Notify physician/advanced practitioner if interventions unsuccessful or patient reports new pain   Outcome: Progressing     Problem: INFECTION - ADULT  Goal: Absence or prevention of progression during hospitalization  Description  INTERVENTIONS:  - Assess and monitor for signs and symptoms of infection  - Monitor lab/diagnostic results  - Monitor all insertion sites, i e  indwelling lines, tubes, and drains  - Monitor endotracheal (as able) and nasal secretions for changes in amount and color  - [place] appropriate cooling/warming therapies per order  - Administer medications as ordered  - Instruct and encourage patient and family to use good hand hygiene technique  - Identify and instruct in appropriate isolation precautions for identified infection/condition    Outcome: Progressing     Problem: SAFETY ADULT  Goal: Patient will remain free of falls  Description  INTERVENTIONS:  - Assess patient frequently for physical needs  -  Identify cognitive and physical deficits and behaviors that affect risk of falls    -  [place] fall precautions as indicated by assessment   - Educate patient/family on patient safety including physical limitations  - Instruct patient to call for assistance with activity based on assessment  - Modify environment to reduce risk of injury  - Consider OT/PT consult to assist with strengthening/mobility    Outcome: Progressing  Goal: Maintain or return to baseline ADL function  Description  INTERVENTIONS:  -  Assess patient's ability to carry out ADLs; assess patient's baseline for ADL function and identify physical deficits which impact ability to perform ADLs (bathing, care of mouth/teeth, toileting, grooming, dressing, etc )  - Assess/evaluate cause of self-care deficits   - Assess range of motion  - Assess patient's mobility; develop plan if impaired  - Assess patient's need for assistive devices and provide as appropriate  - Encourage maximum independence but intervene and supervise when necessary  ¯ Involve family in performance of ADLs  ¯ Assess for home care needs following discharge   ¯ Request OT consult to assist with ADL evaluation and planning for discharge  ¯ Provide patient education as appropriate    Outcome: Progressing  Goal: Maintain or return mobility status to optimal level  Description  INTERVENTIONS:  - Assess patient's baseline mobility status (ambulation, transfers, stairs, etc )    - Identify cognitive and physical deficits and behaviors that affect mobility  - Identify mobility aids required to assist with transfers and/or ambulation (gait belt, sit-to-stand, lift, walker, cane, etc )  - [place] fall precautions as indicated by assessment  - Record patient progress and toleration of activity level on Mobility SBAR; progress patient to next Phase/Stage  - Instruct patient to call for assistance with activity based on assessment  - Request Rehabilitation consult to assist with strengthening/weightbearing, etc     Outcome: Progressing     Problem: DISCHARGE PLANNING  Goal: Discharge to home or other facility with appropriate resources  Description  INTERVENTIONS:  - Identify barriers to discharge w/patient and caregiver  - Arrange for needed discharge resources and transportation as appropriate  - Identify discharge learning needs (meds, wound care, etc )  - Arrange for interpretive services to assist at discharge as needed  - Refer to Case Management Department for coordinating discharge planning if the patient needs post-hospital services based on physician/advanced practitioner order or complex needs related to functional status, cognitive ability, or social support system   Outcome: Progressing     Problem: Knowledge Deficit  Goal: Patient/family/caregiver demonstrates understanding of disease process, treatment plan, medications, and discharge instructions  Description  Complete learning assessment and assess knowledge base    Interventions:  - Provide teaching at level of understanding  - Provide teaching via preferred learning methods   Outcome: Progressing     Problem: GENITOURINARY - ADULT  Goal: Maintains or returns to baseline urinary function  Description  INTERVENTIONS:  - Assess urinary function  - Encourage oral fluids to ensure adequate hydration  - Administer IV fluids as ordered to ensure adequate hydration  - Administer ordered medications as needed  - Offer frequent toileting  - Follow urinary retention protocol if ordered   Outcome: Progressing  Goal: Absence of urinary retention  Description  INTERVENTIONS:  - Assess patient?s ability to void and empty bladder  - Monitor I/O  - Bladder scan as needed  - Discuss with physician/AP medications to alleviate retention as needed  - Discuss catheterization for long term situations as appropriate   Outcome: Progressing     Problem: DISCHARGE PLANNING - CARE MANAGEMENT  Goal: Discharge to post-acute care or home with appropriate resources  Description  INTERVENTIONS:  - Conduct assessment to determine patient/family and health care team treatment goals, and need for post-acute services based on payer coverage, community resources, and patient preferences, and barriers to discharge  - Address psychosocial, clinical, and financial barriers to discharge as identified in assessment in conjunction with the patient/family and health care team  - Arrange appropriate level of post-acute services according to patient's   needs and preference and payer coverage in collaboration with the physician and health care team  - Communicate with and update the patient/family, physician, and health care team regarding progress on the discharge plan  - Arrange appropriate transportation to post-acute venues  - Pt to d/c with appropriate resources when medically stable     Outcome: Progressing

## 2019-02-13 NOTE — PLAN OF CARE
Problem: PHYSICAL THERAPY ADULT  Goal: Performs mobility at highest level of function for planned discharge setting  See evaluation for individualized goals  Description  Treatment/Interventions: LE strengthening/ROM, Therapeutic exercise, Endurance training, Patient/family training, Cognitive reorientation, Equipment eval/education, Bed mobility, Spoke to advanced practitioner, Spoke to case management, Spoke to nursing  Equipment Recommended:  (TBD- may benefit from [facility] bed)       See flowsheet documentation for full assessment, interventions and recommendations  Note:   Prognosis: Fair  Problem List: Decreased strength, Decreased range of motion, Decreased endurance, Impaired balance, Decreased mobility, Decreased coordination, Decreased cognition, Impaired judgement, Decreased safety awareness, Pain, Obesity  Assessment: Pt was agreeable to PT treatment session  Pt demonstrated knee flexion contracture and external rotation of the LLE and would benefit from a multipodus boot  It was noted throughout session, that he also demonstrated LUE weakness and was unable to grasp or lift against gravity, but tolerated drinking and dynamic reaching with his RUE  RN was notified of potential left sided weakness throughout LUE and LLE  Pt tolerated sitting EOB for 9 minutes with varying degrees of assist from min A for bilateral knee block for safety to max A x1 for retropulsion with fatigue  Pt would continue to benefit from skilled PT to improve strength, endurance, balance, and mobility to return to OF  Barriers to Discharge: (medical status)     Recommendation: Short-term skilled PT(rehab)     PT - OK to Discharge: Yes    See flowsheet documentation for full assessment

## 2019-02-13 NOTE — PHYSICAL THERAPY NOTE
PT Treatment       02/13/19 1746   Pain Assessment   Pain Assessment FLACC   Pain Rating: FLACC (Rest) - Face 0   Pain Rating: FLACC (Rest) - Legs 0   Pain Rating: FLACC (Rest) - Activity 0   Pain Rating: FLACC (Rest) - Cry 0   Pain Rating: FLACC (Rest) - Consolability 0   Score: FLACC (Rest) 0   Pain Rating: FLACC (Activity) - Face 1   Pain Rating: FLACC (Activity) - Legs 1   Pain Rating: FLACC (Activity) - Activity 0   Pain Rating: FLACC (Activity) - Cry 1   Pain Rating: FLACC (Activity) - Consolability 1   Score: FLACC (Activity) 4   Restrictions/Precautions   Weight Bearing Precautions Per Order No   Other Precautions Cognitive; Bed Alarm;Telemetry; Fall Risk;O2;Pain   General   Chart Reviewed Yes   Additional Pertinent History transfer from MICU   Response to Previous Treatment Patient with no complaints from previous session  Family/Caregiver Present No   Cognition   Overall Cognitive Status Impaired   Arousal/Participation Responsive; Cooperative   Attention Attends with cues to redirect   Orientation Level Oriented to person;Oriented to place; Disoriented to time;Disoriented to situation  (Reported April of 2014 and unaware of situation)   Memory Decreased recall of precautions   Following Commands Follows multistep commands with increased time or repetition   Comments Pt is pleasant and cooperative   Subjective   Subjective "I'm going to die"   Bed Mobility   Rolling R 3  Moderate assistance   Additional items Assist x 2; Increased time required;LE management   Rolling L 2  Maximal assistance   Additional items Assist x 2; Increased time required;LE management   Supine to Sit 2  Maximal assistance   Additional items Assist x 2; Increased time required;LE management   Sit to Supine 1  Dependent   Additional items Assist x 3;LE management; Increased time required;Verbal cues   Additional Comments Pt sat EOB for 9 minutes   Transfers   Sit to Stand Unable to assess  (Unsafe at this time)   Ambulation/Elevation Gait pattern Not appropriate   Balance   Static Sitting Fair -   Dynamic Sitting Poor +   Endurance Deficit   Endurance Deficit Yes   Endurance Deficit Description fatigue   Activity Tolerance   Activity Tolerance Patient tolerated treatment well   Nurse Made Aware Yes, RN cleared pt for PT and present during session   Exercises   Balance training  Sitting edge of bed with dynamic reaching of BUE   Assessment   Prognosis Fair   Problem List Decreased strength;Decreased range of motion;Decreased endurance; Impaired balance;Decreased mobility; Decreased coordination;Decreased cognition; Impaired judgement;Decreased safety awareness;Pain;Obesity   Assessment Pt was agreeable to PT treatment session  Pt demonstrated knee flexion contracture and external rotation of the LLE and would benefit from a multipodus boot  It was noted throughout session, that he also demonstrated LUE weakness and was unable to grasp or lift against gravity, but tolerated drinking and dynamic reaching with his RUE  RN was notified of potential left sided weakness throughout LUE and LLE  Pt tolerated sitting EOB for 9 minutes with varying degrees of assist from min A for bilateral knee block for safety to max A x1 for retropulsion with fatigue  Pt would continue to benefit from skilled PT to improve strength, endurance, balance, and mobility to return to PLOF     Goals   Patient Goals To get stronger   [facility] Expiration Date 02/19/19   Short Term Goal #1 In 14 days pt will perform rolling and repositioning in bed w/ modA x2; pull to  long sit in bed w/ modA x2; increase LE strength 1/2 grade for increased indep w/ functional mobility; supine to sit EOB with </=mod A x2; tolerate sitting EOB >/= 20 minutes for ADLs and therex; sit pivot transfer to bedside chair using sliding board or drop arm recliner with </=max A x2; further evaluation required for standing goals   Treatment Day 3   Plan   Treatment/Interventions Functional transfer training;LE strengthening/ROM; Therapeutic exercise; Endurance training;Cognitive reorientation;Patient/family training;Equipment eval/education; Bed mobility;Spoke to nursing;OT   PT Frequency   (4-6x/wk)   Recommendation   Recommendation Short-term skilled PT  (rehab)   Equipment Recommended   (TBD; may benefit from multipodus boot for LLE)   PT - OK to Discharge Yes   Additional Comments When medically stable     Vick Torrez, PT, DPT

## 2019-02-13 NOTE — SOCIAL WORK
Spoke to Dr Igor Sweet and about need for HD at discharge  Patient  Is still acute [place]  He can follow up with Michelle Dumas with Dr Edouard Benson  [facility] admission intake form and clinical records were faxed to [facility] central intake  Spoke to RN and Nissa Godfrey- Inpatient HD unit and patient is transported to HD in bed

## 2019-02-13 NOTE — PROGRESS NOTES
Cardiology Progress Note - Veronica Burkett 60 y o  male MRN: [id number]    Unit/Bed#: [facility] 507-01 Encounter: [id number]      Assessment:  1  Heart failure with reduced EF, LVEF 35-40%, LVIDd 6 5 cm  · [facility] - 7/2014 - normal coronaries  2  New-onset atrial fibrillation  3  Sepsis, possibly from skin ulcer / pneumonia  · Shock - resolved  · Blood culture initially 6/6+ for staph aureus, last blood cx 1/27/19 - 2/2 negative  · Sputum Cx - Staph aureus  4  MSSA bacteremia  5  Bioprosthetic AVR #25 (7/2014) with mild bioprosthetic AS  6  NSTEMI type 2  · Troponins peaked at >40, ECG - old LBBB  7  Acute renal failure/ATN  · new dialysis initiation this admission, currently dependent  8  Anemia, Hb 7 4  9  Morbid Obesity, Body mass index is 53 82 kg/m²  Outpatient cardiologist: Vandana Hankins    Plan  Heart failure with reduced EF, LVEF 35-40%  · EF drop from 50 to 35% in the setting of septic shock + atrial fibrillation  · Was on milrinone for a few days, but now remains off it for over 2 weeks  · Hemodynamically stable  · Still very volume overloaded  · Has been anuric, and completely dialysis dependent  · Got a trial dose of torsemide 80 yesterday, but remained anuric - likely will be long-term dialysis dependent  · Continue metoprolol 75 bid, amiodarone 200  · Repeat limited echo to evaluate change in LV function    Atrial fibrillation, new onset  · Occurred in setting of sepsis  · Now back in NSR  · On amiodarone 200, metoprolol 75 bid  · On coumadin for AC  · INR supratherapeutic since 2/9 - coumadin dose held        Subjective:   No significant events overnight    Has been mostly bed bound    Review of Systems  No c/o chest pain, No c/o palpitations, No c/o shortness of breath, No c/o dizziness or light-headedness, No c/o abdominal pain, no c/o nausea/vomitting  All other systems negative    Telemetry Review: No significant arrhythmias seen on telemetry review    Objective:   Vitals: Blood pressure 120/60, pulse 104, temperature 98 6 °F (37 °C), temperature source Axillary, resp  rate 20, height 5' 9" (1 753 m), weight (!) 165 kg (364 lb 6 7 oz), SpO2 93 %  , Body mass index is 53 82 kg/m² ,   Orthostatic Blood Pressures      Most Recent Value   Blood Pressure  120/60 filed at 02/13/2019 1130   Patient Position - Orthostatic VS  Lying filed at 02/13/2019 2957         Systolic (74CZO), NXH:520 , Min:103 , ZTH:589     Diastolic (14NFL), SPK:08, Min:55, Max:85    Wt Readings from Last 5 Encounters:   02/13/19 (!) 165 kg (364 lb 6 7 oz)   01/24/19 (!) 154 kg (339 lb 8 1 oz)   06/11/18 (!) 155 kg (341 lb)   04/11/18 (!) 155 kg (341 lb)   12/20/17 (!) 148 kg (325 lb 8 oz)     I/O       02/10 0701 - 02/11 0700 02/11 0701 - 02/12 0700 02/12 0701 - 02/13 0700    P  O  440 820 0    I V  (mL/kg)  300 (1 9)     IV Piggyback 50      Total Intake(mL/kg) 490 (3 5) 1120 (7) 0 (0)    Urine (mL/kg/hr) 0 (0) 0 (0)     Other  3300     Total Output 0 3300     Net +490 -2180 0                     Physical Exam   Constitutional: He is oriented to person, place, and time  He appears well-developed and well-nourished  No distress  Neck: Neck supple  JVD present  Cardiovascular: Normal rate, regular rhythm and normal heart sounds  Exam reveals no gallop and no friction rub  No murmur heard  Pulmonary/Chest: Effort normal and breath sounds normal  No respiratory distress  He has no wheezes  He has no rales  He exhibits no tenderness  Abdominal: Soft  He exhibits no distension  There is no tenderness  Musculoskeletal: He exhibits edema  He exhibits no tenderness  Neurological: He is alert and oriented to person, place, and time  Skin: Skin is warm  Psychiatric: He has a normal mood and affect  Vitals reviewed        Laboratory Results:        CBC with diff:   Results from last 7 days   Lab Units 02/12/19  0501 02/11/19  0525 02/10/19  0456 02/09/19  0548 02/08/19  0525 02/07/19  0455   WBC Thousand/uL 10 13 11 42* 11 02* 11 60* 13 04* 9 37 HEMOGLOBIN g/dL 7 4* 7 7* 7 5* 7 5* 7 8* 7 6*   HEMATOCRIT % 24 3* 24 7* 24 7* 25 0* 25 8* 24 8*   MCV fL 86 87 88 87 88 87   PLATELETS Thousands/uL 212 215 234 234 260 191   MCH pg 26 2* 27 2 26 7* 26 1* 26 4* 26 7*   MCHC g/dL 30 5* 31 2* 30 4* 30 0* 30 2* 30 6*   RDW % 17 7* 17 6* 18 1* 17 9* 18 2* 18 0*   MPV fL 11 0 11 6 12 0 11 9 11 8 12 8*   NRBC AUTO /100 WBCs 0 0 0 0 0 0         CMP:  Results from last 7 days   Lab Units 02/13/19  0509 02/12/19  0501 02/11/19  0525 02/09/19  1113 02/09/19  0548 02/08/19  0525 02/07/19  0455   POTASSIUM mmol/L 4 9 4 6 5 1 4 2 4 2 4 6 4 4   CHLORIDE mmol/L 92* 94* 95* 95* 95* 94* 99*   CO2 mmol/L 22 24 22 25 24 23 26   BUN mg/dL 101* 86* 118* 82* 73* 87* 62*   CREATININE mg/dL 7 74* 6 64* 7 87* 5 66* 5 29* 5 84* 4 43*   CALCIUM mg/dL 7 6* 7 7* 7 9* 8 1* 8 0* 8 1* 7 7*   AST U/L  --   --   --   --  23  --   --    ALT U/L  --   --   --   --  10*  --   --    ALK PHOS U/L  --   --   --   --  71  --   --    EGFR ml/min/1 73sq m 7 8 7 10 11 10 14         BMP:  Results from last 7 days   Lab Units 02/13/19  0509 02/12/19  0501 02/11/19  0525 02/09/19  1113 02/09/19  0548 02/08/19  0525 02/07/19  0455   POTASSIUM mmol/L 4 9 4 6 5 1 4 2 4 2 4 6 4 4   CHLORIDE mmol/L 92* 94* 95* 95* 95* 94* 99*   CO2 mmol/L 22 24 22 25 24 23 26   BUN mg/dL 101* 86* 118* 82* 73* 87* 62*   CREATININE mg/dL 7 74* 6 64* 7 87* 5 66* 5 29* 5 84* 4 43*   CALCIUM mg/dL 7 6* 7 7* 7 9* 8 1* 8 0* 8 1* 7 7*       BNP: No results for input(s): BNP in the last 72 hours      Magnesium:   Results from last 7 days   Lab Units 02/12/19  0501 02/11/19  0525 02/10/19  0456 02/09/19  0548 02/08/19  0525 02/07/19  0455   MAGNESIUM mg/dL 2 7* 2 8* 2 8* 2 6 2 8* 2 5       Coags:   Results from last 7 days   Lab Units 02/13/19  0509 02/12/19  0501 02/11/19  0525 02/10/19  0456 02/09/19  0548 02/08/19  2248 02/08/19  1432 02/08/19  0945 02/08/19  0525 02/07/19  0455   PTT seconds  --   --   --   --   --  79* 81*  --  110* 87* INR  4 06* 3 93* 5 71* 3 61* 1 68*  --   --  1 34*  --   --        TSH:        Hemoglobin A1C   Results from last 7 days   Lab Units 02/13/19  0509   HEMOGLOBIN A1C % 6 2       Lipid Profile:   Results from last 7 days   Lab Units 02/13/19  0509   TRIGLYCERIDES mg/dL 129   HDL mg/dL 44       Meds/Allergies   all current active meds have been reviewed and current meds:   Current Facility-Administered Medications   Medication Dose Route Frequency    acetaminophen (TYLENOL) tablet 650 mg  650 mg Oral Q6H PRN    amiodarone tablet 200 mg  200 mg Oral Daily With Breakfast    bisacodyl (DULCOLAX) rectal suppository 10 mg  10 mg Rectal Daily PRN    calcium acetate (PHOSLO) capsule 1,334 mg  1,334 mg Oral BID    ceFAZolin (ANCEF) 1 g in sodium chloride 0 9% 50 ml IVPB  1,000 mg Intravenous Q24H    guaiFENesin (MUCINEX) 12 hr tablet 600 mg  600 mg Oral Q12H ARAECLIS    HYDROmorphone (DILAUDID) injection 1 mg  1 mg Intravenous Q3H PRN    ipratropium (ATROVENT) 0 02 % inhalation solution 0 5 mg  0 5 mg Nebulization Q6H PRN    levalbuterol (XOPENEX) inhalation solution 1 25 mg  1 25 mg Nebulization Q6H PRN    metoprolol (LOPRESSOR) injection 5 mg  5 mg Intravenous Q6H PRN    metoprolol tartrate (LOPRESSOR) tablet 75 mg  75 mg Oral Q12H [address]    nystatin (MYCOSTATIN) powder   Topical BID    oxyCODONE (ROXICODONE) immediate release tablet 10 mg  10 mg Oral Q4H PRN    polyethylene glycol (MIRALAX) packet 17 g  17 g Oral Daily    polyvinyl alcohol (LIQUIFILM TEARS) 1 4 % ophthalmic solution 1 drop  1 drop Both Eyes Q3H PRN    QUEtiapine (SEROquel) tablet 25 mg  25 mg Oral HS    senna-docusate sodium (SENOKOT S) 8 6-50 mg per tablet 1 tablet  1 tablet Oral BID    torsemide (DEMADEX) tablet 80 mg  80 mg Oral Daily     Medications Prior to Admission   Medication    amLODIPine (NORVASC) 5 mg tablet    ascorbic acid (VITAMIN C) 500 MG tablet    aspirin (ECOTRIN) 325 mg EC tablet    fluticasone (FLONASE) 50 mcg/act nasal spray    loratadine (CLARITIN) 10 mg tablet    metoprolol tartrate (LOPRESSOR) 100 mg tablet    potassium chloride (K-DUR,KLOR-CON) 20 mEq tablet    pravastatin (PRAVACHOL) 40 mg tablet    torsemide (DEMADEX) 20 mg tablet            Cardiac testing:   Results for orders placed during the hospital encounter of 19   Echo complete with contrast if indicated    Narrative [address] [address]  [phone]    Transthoracic Echocardiogram  2D, Doppler, and Color Doppler    Study date:  2019    Patient: Liliana Suarez  MR number: [id number]  Account number: [de-identified]  : 1960  Age: 60 years  Gender: Male  Status: Inpatient  Location: Bedside  Height: 72 in  Weight: 339 lb  BP: 89/ 54 mmHg    Indications: chest pain    Diagnoses: R07 9 - Chest pain, unspecified    Sonographer:  Lori Quinones CCT  Primary Physician:  Lois Martínez DO  Referring Physician:  Eladio Trevino DO  Group:  [facility]  Interpreting Physician:  Efren Walton DO    SUMMARY    PROCEDURE INFORMATION:  This was a technically difficult study despite the administration of echo contrast     LEFT VENTRICLE:  Systolic function was markedly reduced  Ejection fraction was estimated to be 30 %  There was severe diffuse hypokinesis with no obvious identifiable regional variations  Wall thickness was moderately increased  Concentric hypertrophy was present  VENTRICULAR SEPTUM:  There was dyssynergic motion  RIGHT VENTRICLE:  The ventricle was mildly dilated  Systolic function was moderately to markedly reduced  LEFT ATRIUM:  The atrium was mildly dilated  RIGHT ATRIUM:  The atrium was mildly dilated  AORTIC VALVE:  A bioprosthesis was present  It was not well visualized  Mean transvalvular gradient 13 mmHg  TRICUSPID VALVE:  There was mild regurgitation      PERICARDIUM:  A small, partially loculated pericardial effusion was identified along the left ventricular free wall  HISTORY: PRIOR HISTORY: Aortic valve prosthesis    PROCEDURE: The procedure was performed at the bedside  This was a routine study  The transthoracic approach was used  The study included complete 2D imaging, complete spectral Doppler, and color Doppler  The heart rate was 80 bpm, at the  start of the study  Intravenous contrast (Definity solution [1 3 ml Definity/8 7ml normal saline solution]) was administered  Intravenous contrast (Definity solution [1 3 ml Definity/8 7ml normal saline solution]) was administered to  opacify the left ventricle and enhance Doppler signals  Echocardiographic views were limited due to low windows and patient on mechanical ventilator  This was a technically difficult study despite the administration of echo contrast     LEFT VENTRICLE: Size was normal  Systolic function was markedly reduced  Ejection fraction was estimated to be 30 %  There was severe diffuse hypokinesis with no obvious identifiable regional variations  Wall thickness was moderately  increased  Concentric hypertrophy was present  DOPPLER: Normal sinus rhythm was absent  The study was not technically sufficient to allow evaluation of LV diastolic function  VENTRICULAR SEPTUM: There was dyssynergic motion  RIGHT VENTRICLE: The ventricle was mildly dilated  Systolic function was moderately to markedly reduced  LEFT ATRIUM: The atrium was mildly dilated  RIGHT ATRIUM: The atrium was mildly dilated  MITRAL VALVE: Not well visualized  DOPPLER: The transmitral velocity was within the normal range  There was no evidence for stenosis  There was no significant regurgitation  AORTIC VALVE: A bioprosthesis was present  It was not well visualized  Mean transvalvular gradient 13 mmHg  DOPPLER: There was no significant regurgitation  TRICUSPID VALVE: The valve structure was normal  There was normal leaflet separation   DOPPLER: The transtricuspid velocity was within the normal range  There was no evidence for stenosis  There was mild regurgitation  The tricuspid jet  envelope definition was inadequate for estimation of RV systolic pressure  PULMONIC VALVE: Leaflets exhibited normal thickness, no calcification, and normal cuspal separation  DOPPLER: The transpulmonic velocity was within the normal range  There was no significant regurgitation  PERICARDIUM: A small, partially loculated pericardial effusion was identified along the left ventricular free wall  The pericardium was normal in appearance  AORTA: The root exhibited normal size  SYSTEM MEASUREMENT TABLES    2D  %FS: 15 83 %  Ao Diam: 3 09 cm  EDV(Teich): 221 88 ml  EF(Teich): 32 54 %  ESV(Teich): 149 69 ml  IVSd: 1 59 cm  LA Diam: 5 18 cm  LVIDd: 6 58 cm  LVIDs: 5 54 cm  LVPWd: 1 43 cm  SV(Teich): 72 19 ml    CW  AV Env  Ti: 233 56 ms  AV VTI: 49 77 cm  AV Vmax: 2 64 m/s  AV Vmax: 2 67 m/s  AV Vmean: 2 13 m/s  AV maxP 95 mmHg  AV maxP 53 mmHg  AV meanP 73 mmHg    PW  LVOT Env  Ti: 215 22 ms  LVOT VTI: 11 41 cm  LVOT Vmax: 0 59 m/s  LVOT Vmax: 0 7 m/s  LVOT Vmean: 0 52 m/s  LVOT maxP 78 mmHg  LVOT maxP mmHg  LVOT meanP 24 mmHg  MV E Deep: 1 01 m/s    [facility] Accredited Echocardiography Laboratory    Prepared and electronically signed by    Gabbie Alcantara DO  Signed 2019 10:14:55       Results for orders placed during the hospital encounter of 19   HCON    Narrative [facility]  [facility], [address] [facility]  [phone]    Transesophageal Echocardiogram  2D, Doppler, and Color Doppler    Study date:  2019    Patient: Tori Fuchs  MR number: [id number]  Account number: [de-identified]  : 1960  Age: 60 years  Gender: Male  Status: Inpatient  Location: Bedside  Height: 69 in  Weight: 319 lb  BP: 101/ 54 mmHg    Indications: Bacteremia      Diagnoses: R78 81 - Bacteremia    Sonographer:  Baltazar Jones RDCS  Interpreting Physician:  Joby Chowdhury DO  Primary Physician:  Simone Greenwood DO  Referring Physician:  Rochelle Lucio DO  Group:  [facility]  Cardiology Fellow:  Ellen Murphy MD    IMPRESSIONS:  There was no echocardiographic evidence for valvular vegetation  SUMMARY    LEFT VENTRICLE:  Systolic function was moderately reduced  Ejection fraction was estimated to be 40 %  There was moderate diffuse hypokinesis  Wall thickness was mildly increased  There was mild concentric hypertrophy  RIGHT VENTRICLE:  The size was normal   Systolic function was mildly reduced  LEFT ATRIUM:  The atrium was mildly dilated  ATRIAL SEPTUM:  There was a small patent foramen ovale with left to right shunt identified by color Doppler  RIGHT ATRIUM:  The atrium was mildly dilated  MITRAL VALVE:  There was trace regurgitation  There was no echocardiographic evidence of vegetation  AORTIC VALVE:  A bioprosthesis was present  It exhibited normal function  There was no echocardiographic evidence of vegetation  TRICUSPID VALVE:  There was mild regurgitation  There was no echocardiographic evidence of vegetation  HISTORY: PRIOR HISTORY: Aortic valve replacement, NSTEMI, Cardiomyopathy, Acute kidney injury  PROCEDURE: The procedure was performed at the bedside  This was a routine study  The risks and alternatives of the procedure were explained to the patient's next of kin and informed consent was obtained  The transesophageal approach was  used  The study included complete 2D imaging, complete spectral Doppler, and color Doppler  The heart rate was 113 bpm, at the start of the study  An adult omniplane probe was inserted by the cardiology fellow under direct supervision of  the attending cardiologist  Intubated with ease  One intubation attempt(s)  There was no blood detected on the probe  Image quality was adequate  There were no complications during the procedure  MEDICATIONS: Sedation administered by  bedside nurse  LEFT VENTRICLE: Size was normal  Systolic function was moderately reduced  Ejection fraction was estimated to be 40 %  There was moderate diffuse hypokinesis  Wall thickness was mildly increased  There was mild concentric hypertrophy  DOPPLER: The study was not technically sufficient to allow evaluation of LV diastolic function  RIGHT VENTRICLE: The size was normal  Systolic function was mildly reduced  Wall thickness was normal     LEFT ATRIUM: The atrium was mildly dilated  No thrombus was identified  APPENDAGE: The appendage was small  No thrombus was identified  DOPPLER: The function was normal (normal emptying velocity)  ATRIAL SEPTUM: There was a small patent foramen ovale with left to right shunt identified by color Doppler  RIGHT ATRIUM: The atrium was mildly dilated  No thrombus was identified  MITRAL VALVE: Valve structure was normal  There was normal leaflet separation  There was no echocardiographic evidence of vegetation  DOPPLER: There was trace regurgitation  AORTIC VALVE: A bioprosthesis was present  It exhibited normal function  There was no echocardiographic evidence of vegetation  TRICUSPID VALVE: The valve structure was normal  There was normal leaflet separation  There was no echocardiographic evidence of vegetation  DOPPLER: There was mild regurgitation  PULMONIC VALVE: Leaflets exhibited normal thickness, no calcification, and normal cuspal separation  There was no echocardiographic evidence of vegetation  DOPPLER: There was no significant regurgitation  PERICARDIUM: There was no pericardial effusion seen in the images obtained  AORTA: The root exhibited normal size  There was no atheroma  There was no evidence for dissection  There was no evidence for aneurysm      [facility] [facility]    Prepared and electronically signed by    DO Tiburcio Keating 25-Jan-2019 14:01:14       No results found for this or any previous visit  No results found for this or any previous visit  Counseling / Coordination of Care  Total floor / unit time spent today 20 minutes  Greater than 50% of total time was spent with the patient and / or family counseling and / or coordination of care  A description of the counseling / coordination of care: Obtained history, performed physical examination, discussed laboratory and imaging results, and explained further plan of care  Jeannette Pacheco

## 2019-02-13 NOTE — PROGRESS NOTES
[address] Internal Medicine Progress Note  Patient: Yue Martínez 60 y o  male   MRN: [id number]  PCP: Bairon Christianson DO  Unit/Bed#: [facility] 507-01 Encounter: [id number]  Date Of Visit: 02/13/19    Assessment:    Principal Problem:    Staphylococcus aureus bacteremia  Active Problems:    Increased BMI    Stress-induced cardiomyopathy    Acute respiratory failure with hypoxia ([facility] )    History of aortic valve replacement with bioprosthetic valve    Atrial fibrillation (HCC)    Transaminitis    Thrombocytopenia (HCC)    Anemia    Leukocytosis    Cerebrovascular accident ([facility] )    PATRICK (acute kidney injury) ([facility] )    Hypervolemia      Plan:    1  MSSA bacteremia, negative CHON, negative repeat blood culture, per ID, plan for 6 weeks of antibiotic through 3/10  2  Recent shock, likely multifactorial,  cardiogenic/septic, resolved  3  PATRICK secondary to ATN, no sign of renal recovery,  now HD dependent on MWF, nephrology is following  4  Acute systolic CHF/cardiomyopathy with EF 30%, cardiology is following, on Lopressor and Demadex, volume status managed by HD  5  S/p bioprosthetic AVR, negative CHON for vegetation  6  Acute hypoxic respiratory failure secondary to above, resolved s/p  extubation, continue supportive care  7  New onset AFib with supratherapeutic INR, continue to hold Coumadin, continue amiodarone  8  Non MI- elevated troponin  9  Toxic metabolic encephalopathy with abnormal head CT scan, evaluated by Neurology likely ischemic event bilaterally recommendation to repeat head CT scan in 2 weeks, add aspirin and  statin   10  Anemia of chronic disease, monitor, check R83 and folic acid  11   Morbid obesity    Discussed with ID, no need for PICC line  Antibiotics will likely be dosed with hemodialysis at discharge  Discussed with nursing staff to find a peripheral site so we can discontinue the central line    VTE Pharmacologic Prophylaxis:   Pharmacologic: Warfarin (Coumadin)  Mechanical VTE Prophylaxis in Place: Yes    Patient Centered Rounds: I have performed bedside rounds with nursing staff today  Discussions with Specialists or Other Care Team Provider:     Education and Discussions with Family / Patient:  Patient    Time Spent for Care: 30 minutes  More than 50% of total time spent on counseling and coordination of care as described above  Current Length of Stay: 20 day(s)    Current Patient Status: Inpatient   Certification Statement: The patient will continue to require additional inpatient hospital stay due to Management of PATRICK    Discharge Plan / Estimated Discharge Date:  Awaiting rehab placement    Code Status: Level 1 - Full Code      Subjective:   Patient seen and examined  Comfortable in bed  Somewhat confused  Denied pain    Objective:     Vitals:   Temp (24hrs), Av 6 °F (37 °C), Min:98 1 °F (36 7 °C), Max:98 8 °F (37 1 °C)    Temp:  [98 1 °F (36 7 °C)-98 8 °F (37 1 °C)] 98 6 °F (37 °C)  HR:  [] 104  Resp:  [18-23] 20  BP: (103-163)/(55-85) 150/64  SpO2:  [93 %-98 %] 93 %  Body mass index is 53 82 kg/m²  Input and Output Summary (last 24 hours):        Intake/Output Summary (Last 24 hours) at 2019 1449  Last data filed at 2019 1323  Gross per 24 hour   Intake 1850 ml   Output 2500 ml   Net -650 ml       Physical Exam:     Physical Exam  Patient is awake in no acute distress  Nurse aide is feeding him  Lung with decreased breath sounds bilateral  Heart positive S1-S2 irregular,  no murmur  Abdomen soft obese distended nontender  Lower extremities edema    Additional Data:     Labs:    Results from last 7 days   Lab Units 19  0501   WBC Thousand/uL 10 13   HEMOGLOBIN g/dL 7 4*   HEMATOCRIT % 24 3*   PLATELETS Thousands/uL 212   NEUTROS PCT % 86*   LYMPHS PCT % 4*   MONOS PCT % 8   EOS PCT % 1     Results from last 7 days   Lab Units 19  0509  19  0548   POTASSIUM mmol/L 4 9   < > 4 2   CHLORIDE mmol/L 92*   < > 95*   CO2 mmol/L 22   < > 24   BUN mg/dL 101*   < > 73*   CREATININE mg/dL 7 74*   < > 5 29*   CALCIUM mg/dL 7 6*   < > 8 0*   ALK PHOS U/L  --   --  71   ALT U/L  --   --  10*   AST U/L  --   --  23    < > = values in this interval not displayed  Results from last 7 days   Lab Units 02/13/19  0509   INR  4 06*       * I Have Reviewed All Lab Data Listed Above  * Additional Pertinent Lab Tests Reviewed: [address] Admission Reviewed    Imaging:    Imaging Reports Reviewed Today Include:   Imaging Personally Reviewed by Myself Includes:     Recent Cultures (last 7 days):           Last 24 Hours Medication List:     Current Facility-Administered Medications:  acetaminophen 650 mg Oral Q6H PRN Patience Barney MD   amiodarone 200 mg Oral Daily With Breakfast Anil Prescott MD   bisacodyl 10 mg Rectal Daily PRN HERBERT Estes   calcium acetate 1,334 mg Oral BID Edel Suresh MD   cefazolin 2,000 mg Intravenous After Dialysis Ladonna Rees MD   cefazolin 3,000 mg Intravenous After Dialysis Ladonna Rees MD   guaiFENesin 600 mg Oral Q12H [address] Lashanda Hall PA-C   HYDROmorphone 1 mg Intravenous Q3H PRN Lidya Martinez   ipratropium 0 5 mg Nebulization Q6H PRN Sharon Douglas MD   levalbuterol 1 25 mg Nebulization Q6H PRN Sharon Douglas MD   metoprolol 5 mg Intravenous Q6H PRN Savannah Oro DO   metoprolol tartrate 75 mg Oral Q12H [address] Anil Prescott MD   nystatin  Topical BID HERBERT Estes   oxyCODONE 10 mg Oral Q4H PRN Patience Barney MD   polyethylene glycol 17 g Oral Daily KATHY Mathias   polyvinyl alcohol 1 drop Both Eyes Q3H PRN Ary Lee PA-C   QUEtiapine 25 mg Oral HS Ary Lee PA-C   senna-docusate sodium 1 tablet Oral BID Alfredo Bella DO   torsemide 80 mg Oral Daily Juan Thomas DO        Today, Patient Was Seen By: Alfredo Bella DO    ** Please Note: This note has been constructed using a voice recognition system   **

## 2019-02-13 NOTE — PROGRESS NOTES
NEPHROLOGY PROGRESS NOTE   Yulissa Mayers 60 y o  male MRN: [id number]  Unit/Bed#: [facility] 507-01 Encounter: [id number]  Reason for Consult:  End-stage renal disease    ASSESSMENT/PLAN:  1  Acute kidney injury, most likely secondary to ATN plus cardiorenal syndrome  2  Recent cardiogenic shock  3  Cardiomyopathy, ejection fraction 30%  4  Anemia of chronic disease  5  Hyperphosphatemia, continue with phosphate binders  6  Atrial fibrillation     PLAN:  · Unfortunate no signs of renal recovery  · Continue with maintenance hemodialysis Monday Wednesday Friday  · Ultrafiltrate approximately 2 L  · Blood pressure appears stable    SUBJECTIVE:  Seen examined  Patient awake alert  Denies any chest pain  At time short of breath expression with exertion  No abdominal pain  Review of Systems    OBJECTIVE:  Current Weight: Weight - Scale: (!) 165 kg (364 lb 6 7 oz)  Vitals:    02/13/19 0825 02/13/19 0900 02/13/19 0930 02/13/19 1000   BP: 122/55 118/58 146/60 141/66   BP Location:       Pulse: 101 57 101 97   Resp:       Temp:       TempSrc:       SpO2:       Weight:       Height:           Intake/Output Summary (Last 24 hours) at 2/13/2019 1035  Last data filed at 2/13/2019 0824  Gross per 24 hour   Intake 1430 ml   Output 0 ml   Net 1430 ml       Physical Exam   Constitutional: Vital signs are normal  No distress  Currently on hemodialysis   HENT:   Head: Normocephalic  Eyes: No scleral icterus  Neck: Neck supple  JVD present  Cardiovascular: Normal rate and regular rhythm  Pulmonary/Chest: Breath sounds normal  No respiratory distress  Abdominal: Soft  He exhibits distension  There is no tenderness  Musculoskeletal: He exhibits edema  Neurological: He is alert  Skin: Skin is warm         Medications:    Current Facility-Administered Medications:     acetaminophen (TYLENOL) tablet 650 mg, 650 mg, Oral, Q6H PRN, Yamila Olea MD, 650 mg at 02/07/19 1149    amiodarone tablet 200 mg, 200 mg, Oral, Daily With Breakfast, Georgie Umana MD, 200 mg at 02/12/19 1050    bisacodyl (DULCOLAX) rectal suppository 10 mg, 10 mg, Rectal, Daily PRN, Winsome Melchor PA-C    calcium acetate (PHOSLO) capsule 1,334 mg, 1,334 mg, Oral, BID, Nilo Jesus MD, 1,334 mg at 02/12/19 1757    ceFAZolin (ANCEF) 1 g in sodium chloride 0 9% 50 ml IVPB, 1,000 mg, Intravenous, Q24H, Brook Howard MD, Stopped at 02/12/19 1900    guaiFENesin (MUCINEX) 12 hr tablet 600 mg, 600 mg, Oral, Q12H [address], Olive Umanzor PA-C, 600 mg at 02/12/19 2234    HYDROmorphone (DILAUDID) injection 1 mg, 1 mg, Intravenous, Q3H PRN, Lidya Martinez, 1 mg at 02/11/19 1600    ipratropium (ATROVENT) 0 02 % inhalation solution 0 5 mg, 0 5 mg, Nebulization, Q6H PRN, Bienvenido Gregory MD    levalbuterol Alvarado Fruits) inhalation solution 1 25 mg, 1 25 mg, Nebulization, Q6H PRN, Bienvenido Gregory MD    metoprolol (LOPRESSOR) injection 5 mg, 5 mg, Intravenous, Q6H PRN, Savannah Oro DO, 5 mg at 02/11/19 1559    metoprolol tartrate (LOPRESSOR) tablet 75 mg, 75 mg, Oral, Q12H [address], Georgie Umana MD, 75 mg at 02/12/19 2233    nystatin (MYCOSTATIN) powder, , Topical, BID, Winsome Melchor PA-C    oxyCODONE (ROXICODONE) immediate release tablet 10 mg, 10 mg, Oral, Q4H PRN, Brook Howard MD, 10 mg at 02/12/19 2011    polyethylene glycol (MIRALAX) packet 17 g, 17 g, Oral, Daily, KATHY Mathias, 17 g at 02/10/19 0806    polyvinyl alcohol (LIQUIFILM TEARS) 1 4 % ophthalmic solution 1 drop, 1 drop, Both Eyes, Q3H PRN, Annia Durán PA-C, 1 drop at 02/07/19 0813    QUEtiapine (SEROquel) tablet 25 mg, 25 mg, Oral, HS, Annia Durán PA-C, 25 mg at 02/12/19 2234    senna-docusate sodium (SENOKOT S) 8 6-50 mg per tablet 1 tablet, 1 tablet, Oral, BID, Darol DO Junito, 1 tablet at 02/12/19 1757    torsemide (DEMADEX) tablet 80 mg, 80 mg, Oral, Daily, Jonny Quintanilla DO, 80 mg at 02/12/19 1450    Laboratory Results:  Results from last 7 days   Lab Units 02/13/19  0509 02/12/19  0501 02/11/19  0525 02/10/19  0456 02/09/19  1113 02/09/19  0548 02/08/19  0525 02/07/19  0455   WBC Thousand/uL  --  10 13 11 42* 11 02*  --  11 60* 13 04* 9 37   HEMOGLOBIN g/dL  --  7 4* 7 7* 7 5*  --  7 5* 7 8* 7 6*   HEMATOCRIT %  --  24 3* 24 7* 24 7*  --  25 0* 25 8* 24 8*   PLATELETS Thousands/uL  --  212 215 234  --  234 260 191   POTASSIUM mmol/L 4 9 4 6 5 1  --  4 2 4 2 4 6 4 4   CHLORIDE mmol/L 92* 94* 95*  --  95* 95* 94* 99*   CO2 mmol/L 22 24 22  --  25 24 23 26   BUN mg/dL 101* 86* 118*  --  82* 73* 87* 62*   CREATININE mg/dL 7 74* 6 64* 7 87*  --  5 66* 5 29* 5 84* 4 43*   CALCIUM mg/dL 7 6* 7 7* 7 9*  --  8 1* 8 0* 8 1* 7 7*   MAGNESIUM mg/dL  --  2 7* 2 8* 2 8*  --  2 6 2 8* 2 5   PHOSPHORUS mg/dL  --  8 9* 10 6* 8 9*  --  8 6*  --  7 3*   '

## 2019-02-13 NOTE — QUICK NOTE
Was unable to evaluate patient earlier today as he was at HD  Discussed case with primary service and plan is for removal of his central line once PIVs are obtained  Will change antibiotics to be dosed with HD going forward on MWF  Contacted patient's nurse to let her know of the change and to plan to give the patient a 2g dose of Ancef today as he has received HD  Will formally re-evaluate the patient again tomorrow

## 2019-02-14 ENCOUNTER — APPOINTMENT (INPATIENT)
Dept: NON INVASIVE DIAGNOSTICS | Facility: HOSPITAL | Age: 59
DRG: 871 | End: 2019-02-14
Payer: COMMERCIAL

## 2019-02-14 DIAGNOSIS — R78.81 MSSA BACTEREMIA: Primary | ICD-10-CM

## 2019-02-14 DIAGNOSIS — B95.61 MSSA BACTEREMIA: Primary | ICD-10-CM

## 2019-02-14 LAB
ATRIAL RATE: 286 BPM
FOLATE SERPL-MCNC: 15.7 NG/ML (ref 3.1–17.5)
FOLATE SERPL-MCNC: 16.1 NG/ML (ref 3.1–17.5)
INR PPP: 3.41 (ref 0.86–1.17)
INR PPP: 3.47 (ref 0.86–1.17)
PROTHROMBIN TIME: 34.4 SECONDS (ref 11.8–14.2)
PROTHROMBIN TIME: 34.9 SECONDS (ref 11.8–14.2)
QRS AXIS: 52 DEGREES
QRSD INTERVAL: 158 MS
QT INTERVAL: 334 MS
QTC INTERVAL: 437 MS
T WAVE AXIS: 192 DEGREES
VENTRICULAR RATE: 103 BPM
VIT B12 SERPL-MCNC: 555 PG/ML (ref 100–900)

## 2019-02-14 PROCEDURE — 97110 THERAPEUTIC EXERCISES: CPT

## 2019-02-14 PROCEDURE — 97530 THERAPEUTIC ACTIVITIES: CPT

## 2019-02-14 PROCEDURE — 99024 POSTOP FOLLOW-UP VISIT: CPT | Performed by: SURGERY

## 2019-02-14 PROCEDURE — 99232 SBSQ HOSP IP/OBS MODERATE 35: CPT | Performed by: INTERNAL MEDICINE

## 2019-02-14 PROCEDURE — 93880 EXTRACRANIAL BILAT STUDY: CPT

## 2019-02-14 PROCEDURE — 93005 ELECTROCARDIOGRAM TRACING: CPT

## 2019-02-14 PROCEDURE — 82607 VITAMIN B-12: CPT | Performed by: INTERNAL MEDICINE

## 2019-02-14 PROCEDURE — 93010 ELECTROCARDIOGRAM REPORT: CPT | Performed by: INTERNAL MEDICINE

## 2019-02-14 PROCEDURE — 85610 PROTHROMBIN TIME: CPT | Performed by: INTERNAL MEDICINE

## 2019-02-14 PROCEDURE — 94660 CPAP INITIATION&MGMT: CPT

## 2019-02-14 PROCEDURE — 82746 ASSAY OF FOLIC ACID SERUM: CPT | Performed by: INTERNAL MEDICINE

## 2019-02-14 RX ADMIN — CEFAZOLIN SODIUM 2000 MG: 10 INJECTION, POWDER, FOR SOLUTION INTRAVENOUS at 16:16

## 2019-02-14 RX ADMIN — CALCIUM ACETATE 1334 MG: 667 CAPSULE ORAL at 09:50

## 2019-02-14 RX ADMIN — GUAIFENESIN 600 MG: 600 TABLET, EXTENDED RELEASE ORAL at 09:50

## 2019-02-14 RX ADMIN — NYSTATIN: 100000 POWDER TOPICAL at 09:52

## 2019-02-14 RX ADMIN — OXYCODONE HYDROCHLORIDE 10 MG: 10 TABLET ORAL at 16:35

## 2019-02-14 RX ADMIN — NYSTATIN 1 APPLICATION: 100000 POWDER TOPICAL at 17:09

## 2019-02-14 RX ADMIN — AMIODARONE HYDROCHLORIDE 200 MG: 200 TABLET ORAL at 09:49

## 2019-02-14 RX ADMIN — GUAIFENESIN 600 MG: 600 TABLET, EXTENDED RELEASE ORAL at 22:42

## 2019-02-14 RX ADMIN — QUETIAPINE FUMARATE 25 MG: 25 TABLET ORAL at 22:42

## 2019-02-14 RX ADMIN — CALCIUM ACETATE 1334 MG: 667 CAPSULE ORAL at 16:35

## 2019-02-14 RX ADMIN — ATORVASTATIN CALCIUM 40 MG: 40 TABLET, FILM COATED ORAL at 16:35

## 2019-02-14 RX ADMIN — ASPIRIN 81 MG: 81 TABLET, COATED ORAL at 09:50

## 2019-02-14 RX ADMIN — METOPROLOL SUCCINATE 75 MG: 50 TABLET, EXTENDED RELEASE ORAL at 11:58

## 2019-02-14 RX ADMIN — TORSEMIDE 80 MG: 20 TABLET ORAL at 09:50

## 2019-02-14 RX ADMIN — METOPROLOL TARTRATE 75 MG: 50 TABLET, FILM COATED ORAL at 09:49

## 2019-02-14 NOTE — PROGRESS NOTES
Progress Note - Infectious Disease   Sushila Martines 60 y o  male MRN: [id number]  Unit/Bed#: [facility] 507-01 Encounter: [id number]      Impression/Plan:  1  Severe sepsis/septic shock   Fever, tachycardia, leukocytosis, hypotension   Also with component of cardiogenic shock  Likely precipitated by MSSA bacteremia   No other clear evidence of acute infection identified   Fevers, procalcitonin improved   Leukocytosis normalized      -antibiotic plan as below   -monitor temperatures and hemodynamics  -ongoing supportive care as per primary     2  MSSA bacteremia   Possibility of endocarditis considered in the setting of bioprosthetic AVR   CHON with no evidence of valvular vegetations   Initial portal of entry may have been related to multiple upper back wounds   CT chest/abdomen/pelvis with no other evidence of acute infection   Possible from pneumonia as sputum culture was positive for MSSA   Bacteremia eventually cleared   Podiatry evaluated patient's feet and no acute issues   Neurology evaluation noted with patient's recent changes in mental status, imaging abnormalities felt to be ischemic and similar compared to prior studies  No plan for MRI      -Ancef dosed with dialysis MWF (2g/2g/3g)  -nursing to request dose today of 2 g  -neurology evaluation appreciated  -ongoing follow-up by Cardiology  -patient will ultimately require prolonged course of IV antibiotics of at least 6 weeks through 3/10  -patient will not require PICC line at discharge his antibiotics can be dosed with dialysis  -will plan to obtain surveillance cultures 1 week after completing antibiotics  -patient will require weekly labs including CBC with differential while on antibiotics  -he will need follow-up in the ID office 2 weeks after discharge  -unclear discharge plans at this time   -would repeat CBC with dialysis tomorrow     3  History of bioprosthetic AVR   Secondary to degenerative AS   Placed in July 2014   Keisha Kelly no evidence of endocarditis  Ongoing follow-up by Cardiology      4  Acute kidney injury   Likely ischemic/septic ATN    Patient remains on hemodialysis   He is status post PermCath placement      -ongoing follow-up by Nephrology  -antibiotics currently dosed with dialysis     5  Demarco Man continues to have low level leukocytosis though he remains otherwise hemodynamically stable  White blood cell count normalized on labs  Patient remains otherwise hemodynamically stable and neurology evaluation noted above      -continue to monitor fever curve/vitals  -repeat CBC with dialysis tomorrow  -continue patient on Ancef as above  -if patient spikes fever please send repeat cultures     ID consult service will continue to follow  Antibiotics:  Ancef dosed with HD    24 hr events:  No acute events noted overnight on chart review  Patient is currently afebrile  No new micro data  Patient's other vitals are stable  No new labs this morning    Subjective:  Patient currently denies any nausea, vomiting, chest pain or shortness of breath  He reports feeling fatigued and having generalized weakness  On exam he has limited range of mobility which he notes this is due to profound weakness  Again he can't tell me where he is an who he is but does not remember the details of his admission or how long he has been here  Objective:  Vitals:  Temp:  [98 5 °F (36 9 °C)-99 3 °F (37 4 °C)] 98 8 °F (37 1 °C)  HR:  [] 83  Resp:  [18-20] 18  BP: (118-150)/(55-77) 118/65  SpO2:  [90 %-96 %] 93 %  Temp (24hrs), Av 8 °F (37 1 °C), Min:98 5 °F (36 9 °C), Max:99 3 °F (37 4 °C)  Current: Temperature: 98 8 °F (37 1 °C)    Physical Exam:   General Appearance:  Alert, interactive, nontoxic, no acute distress  Chronically ill appearing  Throat: Oropharynx moist without lesions      Lungs:   Decreased breath sounds throughout anteriorly; respirations unlabored on nasal cannula   Heart:  Irregular rhythm; no murmur, rub or gallop though distant heart sounds   Abdomen:   Soft, non-tender, non-distended, positive bowel sounds  Extremities: No clubbing, cyanosis; decreasing edema in the upper extremity and ongoing edema lower extremities  Skin: No new rashes or lesions on exposed skin  No new draining wounds noted on exposed  Labs, Imaging, & Other studies:   All pertinent labs and imaging studies were personally reviewed  Results from last 7 days   Lab Units 02/12/19  0501 02/11/19  0525 02/10/19  0456   WBC Thousand/uL 10 13 11 42* 11 02*   HEMOGLOBIN g/dL 7 4* 7 7* 7 5*   PLATELETS Thousands/uL 212 215 234     Results from last 7 days   Lab Units 02/13/19  0509  02/09/19  0548   POTASSIUM mmol/L 4 9   < > 4 2   CHLORIDE mmol/L 92*   < > 95*   CO2 mmol/L 22   < > 24   BUN mg/dL 101*   < > 73*   CREATININE mg/dL 7 74*   < > 5 29*   EGFR ml/min/1 73sq m 7   < > 11   CALCIUM mg/dL 7 6*   < > 8 0*   AST U/L  --   --  23   ALT U/L  --   --  10*   ALK PHOS U/L  --   --  71    < > = values in this interval not displayed

## 2019-02-14 NOTE — OCCUPATIONAL THERAPY NOTE
Occupational Therapy Treatment Note:       02/14/19 1258   Restrictions/Precautions   Other Precautions Cognitive; Bed Alarm;Telemetry;O2;Fall Risk;Pain   Pain Assessment   Pain Assessment FLACC   Pain Rating: FLACC (Rest) - Face 0   Pain Rating: FLACC (Rest) - Legs 0   Pain Rating: FLACC (Rest) - Activity 0   Pain Rating: FLACC (Rest) - Cry 1   Pain Rating: FLACC (Rest) - Consolability 1   Score: FLACC (Rest) 2   Pain Rating: FLACC (Activity) - Face 1   Pain Rating: FLACC (Activity) - Legs 1   Pain Rating: FLACC (Activity) - Activity 1   Pain Rating: FLACC (Activity) - Cry 1   Pain Rating: FLACC (Activity) - Consolability 1   Score: FLACC (Activity) 5   ADL   Where Assessed Supine, bed   Grooming Assistance 3  Moderate Assistance   Grooming Deficit Setup;Verbal cueing; Increased time to complete;Brushing hair   UB Dressing Assistance 2  Maximal Assistance   UB Dressing Deficit Setup;Verbal cueing;Supervision/safety; Increased time to complete; Thread RUE; Thread LUE;Pull around back   UB Dressing Comments   (hospital gown)   LB Dressing Assistance 1  Total Assistance   LB Dressing Deficit Don/doff R sock; Don/doff L sock   Bed Mobility   Rolling R 3  Moderate assistance   Additional items Assist x 2; Increased time required;Verbal cues;LE management   Rolling L 3  Moderate assistance   Additional items Assist x 2; Increased time required;Verbal cues;LE management   Transfers   Other 2  Maximal assistance   Additional items Assist x 3;Bedrails; Increased time required;Verbal cues  (slide board in supine)   Additional Comments   (transfer bed>transport bed to lab)   Functional Mobility   Functional Mobility   (not assessed)   Therapeutic Exercise - ROM   UE-ROM Yes   ROM- Right Upper Extremities   R Shoulder AAROM; Flexion   R Elbow AAROM;Elbow flexion   R Hand AAROM; Thumb; Index finger; Long finger;Ring finger;Little finger   R Weight/Reps/Sets 5 reps   ROM - Left Upper Extremities    L Shoulder AAROM; Flexion   L Elbow AAROM;Elbow flexion   L Hand AAROM; Thumb; Index finger; Long finger;Ring finger;Little finger   L Weight/Reps/Sets 5 reps   Cognition   Overall Cognitive Status Impaired   Arousal/Participation Responsive; Cooperative   Attention Attends with cues to redirect   Orientation Level Oriented to person;Oriented to place;Oriented to time   Memory Decreased recall of precautions   Following Commands Follows multistep commands with increased time or repetition   Comments pleasant and cooperative   Activity Tolerance   Activity Tolerance Patient limited by fatigue;Treatment limited secondary to medical complications (Comment)   Medical Staff Made Aware ok to see per RN Leidy   Assessment   Assessment Patient participated in skilled OT with focus on UE ROM for carryover into functional task performance skills, breathing exercises for energy conservation techniques, dressing, hygiene, ADL skills, transfer from bed to [facility] for transport to cardiology lab  Patient is motivated requiring mod vc's to remain focused due to sometimes closing eyes and verbalizing "being tired"  Patient given sufficient rest periods with consistent education in breathing techniques while on O2  Patient motivated within his physical limitations  Patient performed AAROM both UE's for carryover into functional task performance skills  Patient requires max vc's for awarenss of left UE  Patient provided with education on positioning of both UE's for control of swelling both hands  Patient would benefit from [address] with focus on increasing functional strength, increasing functional use of both UEs' for carryover into her daily routine ADL tasks, increase independence with functional transfer skills for carryover into his daily routine  Plan   Treatment Interventions ADL retraining;Functional transfer training;UE strengthening/ROM; Energy conservation   Goal Expiration Date 02/21/19   Treatment Day 2   OT Frequency 3-5x/wk   Recommendation   OT Discharge Recommendation Short Term Rehab   OT - OK to Discharge   (when medically cleared)   Jovani Queen

## 2019-02-14 NOTE — PROGRESS NOTES
Per PT Thea Snider pt  had notable left sided upper and lower weakness during sit and dangling on side of bed  Intermittently used left arm, but seemed to favor right  Inconsistent with using on request  Made CECILE Porter aware  Up to assess pt reports b/l weak but equal   Will continue to monitor

## 2019-02-14 NOTE — PLAN OF CARE
Problem: OCCUPATIONAL THERAPY ADULT  Goal: Performs self-care activities at highest level of function for planned discharge setting  See evaluation for individualized goals  Description  Treatment Interventions: ADL retraining, Functional transfer training, UE strengthening/ROM, Endurance training, Cognitive reorientation, Patient/family training, Equipment evaluation/education, Fine motor coordination activities, Compensatory technique education, Continued evaluation, Energy conservation, Activityengagement          See flowsheet documentation for full assessment, interventions and recommendations      Outcome: 47 Jerede Ben Umana

## 2019-02-14 NOTE — PHYSICAL THERAPY NOTE
Physical Therapy Progress Note     02/14/19 1425   Pain Assessment   Pain Assessment FLACC   Pain Rating: FLACC (Rest) - Face 0   Pain Rating: FLACC (Rest) - Legs 0   Pain Rating: FLACC (Rest) - Activity 0   Pain Rating: FLACC (Rest) - Cry 0   Pain Rating: FLACC (Rest) - Consolability 0   Score: FLACC (Rest) 0   Pain Rating: FLACC (Activity) - Face 1   Pain Rating: FLACC (Activity) - Legs 1   Pain Rating: FLACC (Activity) - Activity 0   Pain Rating: FLACC (Activity) - Cry 1   Pain Rating: FLACC (Activity) - Consolability 1   Score: FLACC (Activity) 4   Restrictions/Precautions   Other Precautions Cognitive; Bed Alarm;Telemetry;O2;Fall Risk;Pain   Subjective   Subjective The pt  states that he is very tired, and that he is not up for sitting at the edge of the bed  Bed Mobility   Rolling R 3  Moderate assistance   Additional items Assist x 2; Increased time required;Verbal cues;LE management   Rolling L 3  Moderate assistance   Additional items Assist x 2; Increased time required;Verbal cues;LE management   Activity Tolerance   Activity Tolerance Patient tolerated treatment well;Patient limited by fatigue   Exercises   TKR Supine;Bilateral;AAROM;15 reps   Assessment   Prognosis Fair   Problem List Decreased strength;Decreased range of motion;Decreased endurance; Impaired balance;Decreased mobility; Decreased coordination;Decreased cognition; Impaired judgement;Decreased safety awareness;Pain;Obesity   Assessment The pt  was limited due to fatigue, decreased strength, and limited activity tolerance  He was able to complete therapeutic exercise with extensive assistance  He will benefit from inpatient rehab at discharge  Barriers to Discharge Inaccessible home environment;Decreased caregiver support   Goals   Patient Goals To rest    STG Expiration Date 02/19/19   Treatment Day 4   Plan   Treatment/Interventions Functional transfer training;LE strengthening/ROM; Therapeutic exercise; Endurance training;Patient/family training;Bed mobility   Progress Slow progress, decreased activity tolerance   PT Frequency   (4-6x a week )   Recommendation   Recommendation Short-term skilled PT     Sophia Negrete, PTA

## 2019-02-14 NOTE — PROGRESS NOTES
NEPHROLOGY PROGRESS NOTE   Maryjo Mezar 60 y o  male MRN: [id number]  Unit/Bed#: [facility] 507-01 Encounter: [id number]      ASSESSMENT/PLAN:  1  Acute kidney injury:  Most likely due to ATN from shock    -currently hemodialysis dependent as he is volume overloaded and anuric   -continue dialysis Monday, Wednesday, Friday  2  Cardiogenic and septic shock  3  MSSA bacteremia:  On IV Ancef for 6 weeks (last dose 3/10)   4  Anemia of CKD:  Iron saturation 9% and ferritin 107   -avoiding IV iron for now in setting of active infection  5  Hyperphosphatemia:  Continue PhosLo with meals  6  Cardiomyopathy with ejection fraction 30%: will continue to UF with dialysis     -continue low sodium diet and fluid restriction  7  Hyponatremia:  Due to volume overload and sodium should improve with volume removal on dialysis   -continue oral fluid restriction   8  Access:  Right PermCath        SUBJECTIVE:  Feeling well with no chest pain or shortness of Breath  He thinks that his swelling is slowly starting to improve  No urine output yet      OBJECTIVE:  Current Weight: Weight - Scale: (!) 166 kg (365 lb 11 9 oz)  Vitals:    02/14/19 0714   BP: 118/65   Pulse: 83   Resp: 18   Temp: 98 8 °F (37 1 °C)   SpO2: 93%       Intake/Output Summary (Last 24 hours) at 2/14/2019 9940  Last data filed at 2/14/2019 0844  Gross per 24 hour   Intake 950 ml   Output 2500 ml   Net -1550 ml     General:  No acute distress  Skin:  No rash  Eyes:  Sclerae anicteric  ENT:  Moist mucous membranes  Neck:  Supple, symmetric  Chest:  Decreased breath sounds bilaterally   CVS:  Regular rate and rhythm  Abdomen:  Soft, nontender, nondistended  Extremities:  Bilateral edema   Neuro:  Awake and alert  Psych:  Appropriate affect    Medications:  Scheduled Meds:    Current Facility-Administered Medications:  acetaminophen 650 mg Oral Q6H PRN Carrington Pompa MD   amiodarone 200 mg Oral Daily With Breakfast Gene Dior MD   aspirin 81 mg Oral Daily Javad Su, DO   atorvastatin 40 mg Oral Daily With [employer], DO   bisacodyl 10 mg Rectal Daily PRN Carol Ga PA-C   calcium acetate 1,334 mg Oral BID Marco Hayden MD   cefazolin 2,000 mg Intravenous After Dialysis Carole Bush MD   cefazolin 3,000 mg Intravenous After Dialysis Carole Bush MD   guaiFENesin 600 mg Oral Q12H [facility] Mary Evans PA-C   HYDROmorphone 1 mg Intravenous Q3H PRN Lidya Martinez   ipratropium 0 5 mg Nebulization Q6H PRN Michael Greenberg MD   levalbuterol 1 25 mg Nebulization Q6H PRN Michael Greenberg MD   metoprolol 5 mg Intravenous Q6H PRN Kevin Alfaro, DO   metoprolol tartrate 75 mg Oral Q12H [facility] Nidhi Mandujano MD   nystatin  Topical BID Carol Ga PA-C   oxyCODONE 10 mg Oral Q4H PRN Lorrie Duncan MD   polyethylene glycol 17 g Oral Daily KATHY Mathias   polyvinyl alcohol 1 drop Both Eyes Q3H PRN Lucía Soto PA-C   QUEtiapine 25 mg Oral HS Lucía Soto PA-C   senna-docusate sodium 1 tablet Oral BID Javad Su, DO   torsemide 80 mg Oral Daily Andreina Quintanilla, DO       PRN Meds:   acetaminophen    bisacodyl    cefazolin    cefazolin    HYDROmorphone    ipratropium    levalbuterol    metoprolol    oxyCODONE    polyvinyl alcohol    Laboratory Results:  Results from last 7 days   Lab Units 02/13/19  0509 02/12/19  0501 02/11/19  0525 02/10/19  0456  02/09/19  0548   WBC Thousand/uL  --  10 13 11 42* 11 02*  --  11 60*   HEMOGLOBIN g/dL  --  7 4* 7 7* 7 5*  --  7 5*   HEMATOCRIT %  --  24 3* 24 7* 24 7*  --  25 0*   PLATELETS Thousands/uL  --  212 215 234  --  234   POTASSIUM mmol/L 4 9 4 6 5 1  --    < > 4 2   CHLORIDE mmol/L 92* 94* 95*  --    < > 95*   CO2 mmol/L 22 24 22  --    < > 24   BUN mg/dL 101* 86* 118*  --    < > 73*   CREATININE mg/dL 7 74* 6 64* 7 87*  --    < > 5 29*   CALCIUM mg/dL 7 6* 7 7* 7 9*  --    < > 8 0*   MAGNESIUM mg/dL  --  2 7* 2 8* 2 8*  --  2 6   PHOSPHORUS mg/dL  --  8 9* 10 6* 8 9*  --  8 6*   ALK PHOS U/L  --   --   --   --   --  71   ALT U/L  --   --   --   --   --  10*   AST U/L  --   --   --   --   --  23    < > = values in this interval not displayed

## 2019-02-14 NOTE — QUICK NOTE
Called by nursing with some concerns of worsening left-sided weakness suggested by physical therapy  I came to see patient and did modified neurologic exam   I could not appreciate any focal deficits  He was able to raise his left arm to his head to tell me where his head hurt  He was able to follow commands without difficulty  He is weak in all extremities but this seems to be symmetric at this time  I see no acute focal deficits at this time  Nurse was provided with my assessment

## 2019-02-14 NOTE — SOCIAL WORK
Discussed patient with [employer] and Adelina Jones at [facility],  [phone], ext [phone]  Completed high acuity checklist and  faxed it with records to [facility]  They request therapy address if patient can do SPT and can tolerate sitting in recliner chair for 3-4 hours for HD treatment  Sent message to PT and OT

## 2019-02-14 NOTE — PLAN OF CARE
Problem: Potential for Falls  Goal: Patient will remain free of falls  Description  INTERVENTIONS:  - Assess patient frequently for physical needs  -  Identify cognitive and physical deficits and behaviors that affect risk of falls  -  [place] fall precautions as indicated by assessment   - Educate patient/family on patient safety including physical limitations  - Instruct patient to call for assistance with activity based on assessment  - Modify environment to reduce risk of injury  - Consider OT/PT consult to assist with strengthening/mobility    Outcome: Progressing     Problem: Prexisting or High Potential for Compromised Skin Integrity  Goal: Skin integrity is maintained or improved  Description  INTERVENTIONS:  - Identify patients at risk for skin breakdown  - Assess and monitor skin integrity  - Assess and monitor nutrition and hydration status  - Monitor labs (i e  albumin)  - Assess for incontinence   - Turn and reposition patient  - Assist with mobility/ambulation  - Relieve pressure over bony prominences  - Avoid friction and shearing  - Provide appropriate hygiene as needed including keeping skin clean and dry  - Evaluate need for skin moisturizer/barrier cream  - Collaborate with interdisciplinary team (i e  Nutrition, Rehabilitation, etc )   - Patient/family teaching   Outcome: Progressing     Problem: Nutrition/Hydration-ADULT  Goal: Nutrient/Hydration intake appropriate for improving, restoring or maintaining nutritional needs  Description  Monitor and assess patient's nutrition/hydration status for malnutrition (ex- brittle hair, bruises, dry skin, pale skin and conjunctiva, muscle wasting, smooth red tongue, and disorientation)  Collaborate with interdisciplinary team and initiate plan and interventions as ordered  Monitor patient's weight and dietary intake as ordered or per policy  Utilize nutrition screening tool and intervene per policy   Determine patient's food preferences and provide high-protein, high-caloric foods as appropriate  INTERVENTIONS:  - Monitor oral intake, urinary output, labs, and treatment plans  - Assess nutrition and hydration status and recommend course of action  - Evaluate amount of meals eaten  - Assist patient with eating if necessary   - Allow adequate time for meals  - Recommend/ encourage appropriate diets, oral nutritional supplements, and vitamin/mineral supplements  - Order, calculate, and assess calorie counts as needed  - Recommend, monitor, and adjust tube feedings and TPN/PPN based on assessed needs  - Assess need for intravenous fluids  - Provide specific nutrition/hydration education as appropriate  - Include patient/family/caregiver in decisions related to nutrition   Outcome: Progressing     Problem: CARDIOVASCULAR - ADULT  Goal: Maintains optimal cardiac output and hemodynamic stability  Description  INTERVENTIONS:  - Monitor I/O, vital signs and rhythm  - Monitor for S/S and trends of decreased cardiac output i e  bleeding, hypotension  - Administer and titrate ordered vasoactive medications to optimize hemodynamic stability  - Assess quality of pulses, skin color and temperature  - Assess for signs of decreased coronary artery perfusion - ex   Angina  - Instruct patient to report change in severity of symptoms   Outcome: Progressing  Goal: Absence of cardiac dysrhythmias or at baseline rhythm  Description  INTERVENTIONS:  - Continuous cardiac monitoring, monitor vital signs, obtain 12 lead EKG if indicated  - Administer antiarrhythmic and heart rate control medications as ordered  - Monitor electrolytes and administer replacement therapy as ordered   Outcome: Progressing     Problem: METABOLIC, FLUID AND ELECTROLYTES - ADULT  Goal: Electrolytes maintained within normal limits  Description  INTERVENTIONS:  - Monitor labs and assess patient for signs and symptoms of electrolyte imbalances  - Administer electrolyte replacement as ordered  - Monitor response to electrolyte replacements, including repeat lab results as appropriate  - Instruct patient on fluid and nutrition as appropriate   Outcome: Progressing  Goal: Fluid balance maintained  Description  INTERVENTIONS:  - Monitor labs and assess for signs and symptoms of volume excess or deficit  - Monitor I/O and WT  - Instruct patient on fluid and nutrition as appropriate   Outcome: Progressing     Problem: PAIN - ADULT  Goal: Verbalizes/displays adequate comfort level or baseline comfort level  Description  Interventions:  - Encourage patient to monitor pain and request assistance  - Assess pain using appropriate pain scale  - Administer analgesics based on type and severity of pain and evaluate response  - Implement non-pharmacological measures as appropriate and evaluate response  - Consider cultural and social influences on pain and pain management  - Notify physician/advanced practitioner if interventions unsuccessful or patient reports new pain   Outcome: Progressing     Problem: INFECTION - ADULT  Goal: Absence or prevention of progression during hospitalization  Description  INTERVENTIONS:  - Assess and monitor for signs and symptoms of infection  - Monitor lab/diagnostic results  - Monitor all insertion sites, i e  indwelling lines, tubes, and drains  - Monitor endotracheal (as able) and nasal secretions for changes in amount and color  - [place] appropriate cooling/warming therapies per order  - Administer medications as ordered  - Instruct and encourage patient and family to use good hand hygiene technique  - Identify and instruct in appropriate isolation precautions for identified infection/condition    Outcome: Progressing     Problem: SAFETY ADULT  Goal: Patient will remain free of falls  Description  INTERVENTIONS:  - Assess patient frequently for physical needs  -  Identify cognitive and physical deficits and behaviors that affect risk of falls    -  [place] fall precautions as indicated by assessment   - Educate patient/family on patient safety including physical limitations  - Instruct patient to call for assistance with activity based on assessment  - Modify environment to reduce risk of injury  - Consider OT/PT consult to assist with strengthening/mobility    Outcome: Progressing  Goal: Maintain or return to baseline ADL function  Description  INTERVENTIONS:  -  Assess patient's ability to carry out ADLs; assess patient's baseline for ADL function and identify physical deficits which impact ability to perform ADLs (bathing, care of mouth/teeth, toileting, grooming, dressing, etc )  - Assess/evaluate cause of self-care deficits   - Assess range of motion  - Assess patient's mobility; develop plan if impaired  - Assess patient's need for assistive devices and provide as appropriate  - Encourage maximum independence but intervene and supervise when necessary  ¯ Involve family in performance of ADLs  ¯ Assess for home care needs following discharge   ¯ Request OT consult to assist with ADL evaluation and planning for discharge  ¯ Provide patient education as appropriate    Outcome: Progressing  Goal: Maintain or return mobility status to optimal level  Description  INTERVENTIONS:  - Assess patient's baseline mobility status (ambulation, transfers, stairs, etc )    - Identify cognitive and physical deficits and behaviors that affect mobility  - Identify mobility aids required to assist with transfers and/or ambulation (gait belt, sit-to-stand, lift, walker, cane, etc )  - [place] fall precautions as indicated by assessment  - Record patient progress and toleration of activity level on Mobility SBAR; progress patient to next Phase/Stage  - Instruct patient to call for assistance with activity based on assessment  - Request Rehabilitation consult to assist with strengthening/weightbearing, etc     Outcome: Progressing     Problem: DISCHARGE PLANNING  Goal: Discharge to home or other facility with appropriate resources  Description  INTERVENTIONS:  - Identify barriers to discharge w/patient and caregiver  - Arrange for needed discharge resources and transportation as appropriate  - Identify discharge learning needs (meds, wound care, etc )  - Arrange for interpretive services to assist at discharge as needed  - Refer to Case Management Department for coordinating discharge planning if the patient needs post-hospital services based on physician/advanced practitioner order or complex needs related to functional status, cognitive ability, or social support system   Outcome: Progressing     Problem: Knowledge Deficit  Goal: Patient/family/caregiver demonstrates understanding of disease process, treatment plan, medications, and discharge instructions  Description  Complete learning assessment and assess knowledge base    Interventions:  - Provide teaching at level of understanding  - Provide teaching via preferred learning methods   Outcome: Progressing     Problem: GENITOURINARY - ADULT  Goal: Maintains or returns to baseline urinary function  Description  INTERVENTIONS:  - Assess urinary function  - Encourage oral fluids to ensure adequate hydration  - Administer IV fluids as ordered to ensure adequate hydration  - Administer ordered medications as needed  - Offer frequent toileting  - Follow urinary retention protocol if ordered   Outcome: Progressing  Goal: Absence of urinary retention  Description  INTERVENTIONS:  - Assess patient?s ability to void and empty bladder  - Monitor I/O  - Bladder scan as needed  - Discuss with physician/AP medications to alleviate retention as needed  - Discuss catheterization for long term situations as appropriate   Outcome: Progressing     Problem: DISCHARGE PLANNING - CARE MANAGEMENT  Goal: Discharge to post-acute care or home with appropriate resources  Description  INTERVENTIONS:  - Conduct assessment to determine patient/family and health care team treatment goals, and need for post-acute services based on payer coverage, community resources, and patient preferences, and barriers to discharge  - Address psychosocial, clinical, and financial barriers to discharge as identified in assessment in conjunction with the patient/family and health care team  - Arrange appropriate level of post-acute services according to patient's   needs and preference and payer coverage in collaboration with the physician and health care team  - Communicate with and update the patient/family, physician, and health care team regarding progress on the discharge plan  - Arrange appropriate transportation to post-acute venues  - Pt to d/c with appropriate resources when medically stable     Outcome: Progressing

## 2019-02-14 NOTE — PLAN OF CARE
Problem: PHYSICAL THERAPY ADULT  Goal: Performs mobility at highest level of function for planned discharge setting  See evaluation for individualized goals  Description  Treatment/Interventions: LE strengthening/ROM, Therapeutic exercise, Endurance training, Patient/family training, Cognitive reorientation, Equipment eval/education, Bed mobility, Spoke to advanced practitioner, Spoke to case management, Spoke to nursing  Equipment Recommended:  (TBD- may benefit from [facility] bed)       See flowsheet documentation for full assessment, interventions and recommendations     Outcome: Progressing

## 2019-02-14 NOTE — PROGRESS NOTES
[address] Internal Medicine Progress Note  Patient: Naseem Lombardi 60 y o  male   MRN: [id number]  PCP: Vj Hardin DO  Unit/Bed#: [facility] 507-01 Encounter: [id number]  Date Of Visit: 02/14/19    Assessment:    Principal Problem:    Staphylococcus aureus bacteremia  Active Problems:    Increased BMI    Stress-induced cardiomyopathy    Acute respiratory failure with hypoxia ([facility] [address] )    History of aortic valve replacement with bioprosthetic valve    Atrial fibrillation (HCC)    Transaminitis    Thrombocytopenia (HCC)    Anemia    Leukocytosis    Cerebrovascular accident ([facility] [address] )    PTARICK (acute kidney injury) ([facility] )    Hypervolemia      Plan:    1  MSSA bacteremia, negative CHON, negative repeat blood culture, per ID, plan for 6 weeks of antibiotic through 3/10  2  Recent shock, likely multifactorial,  cardiogenic/septic, resolved  3  PATRICK secondary to ATN, no sign of renal recovery,  now HD dependent on MWF, nephrology is following  4  Acute systolic CHF/cardiomyopathy with EF 30%, Still with volume overload,  cardiology is following, on Lopressor and Demadex, volume status managed by HD  5  S/p bioprosthetic AVR, negative CHON for vegetation  6  Acute hypoxic respiratory failure secondary to above, resolved s/p extubation, continue supportive care  7  New onset AFib, Still with supratherapeutic INR,  heart rate controlled, continue to hold Coumadin, continue amiodarone  8  Non MI- elevated troponin  9  Toxic metabolic encephalopathy with abnormal head CT scan, evaluated by Neurology,  likely ischemic event bilaterally recommendation to repeat head CT scan in 2 weeks, continue aspirin and  Statin, check bilateral carotid ultrasound  10  Anemia of chronic disease, monitor  11   Morbid obesity    Discontinue central line  DC tele    VTE Pharmacologic Prophylaxis:   Pharmacologic: Warfarin (Coumadin)  Mechanical VTE Prophylaxis in Place: Yes    Patient Centered Rounds: I have performed bedside rounds with nursing staff today     Discussions with Specialists or Other Care Team Provider:     Education and Discussions with Family / Patient:  Patient,  discussed with patient's wife    Time Spent for Care: 30 minutes  More than 50% of total time spent on counseling and coordination of care as described above  Current Length of Stay: 21 day(s)    Current Patient Status: Inpatient   Certification Statement: The patient will continue to require additional inpatient hospital stay due to Management of PATRICK    Discharge Plan / Estimated Discharge Date:  Awaiting rehab placement    Code Status: Level 1 - Full Code      Subjective:   Patient seen and examined  Comfortable in bed  Somewhat confused  Denied pain  No nausea vomiting or diarrhea    Objective:     Vitals:   Temp (24hrs), Av 8 °F (37 1 °C), Min:98 5 °F (36 9 °C), Max:99 3 °F (37 4 °C)    Temp:  [98 5 °F (36 9 °C)-99 3 °F (37 4 °C)] 98 5 °F (36 9 °C)  HR:  [] 90  Resp:  [18-22] 22  BP: (104-150)/(60-77) 104/63  SpO2:  [90 %-96 %] 90 %  Body mass index is 54 01 kg/m²  Input and Output Summary (last 24 hours):        Intake/Output Summary (Last 24 hours) at 2019 1151  Last data filed at 2019 0844  Gross per 24 hour   Intake 950 ml   Output 2500 ml   Net -1550 ml       Physical Exam:     Physical Exam     Patient is awake in no acute distress  Disoriented  Left IJ central line  Lung with decreased breath sounds bilateral  Heart positive S1-S2 irregular,  no murmur  Abdomen soft obese distended nontender  Lower extremities edema    Additional Data:     Labs:    Results from last 7 days   Lab Units 19  0501   WBC Thousand/uL 10 13   HEMOGLOBIN g/dL 7 4*   HEMATOCRIT % 24 3*   PLATELETS Thousands/uL 212   NEUTROS PCT % 86*   LYMPHS PCT % 4*   MONOS PCT % 8   EOS PCT % 1     Results from last 7 days   Lab Units 19  0509  19  0548   POTASSIUM mmol/L 4 9   < > 4 2   CHLORIDE mmol/L 92*   < > 95*   CO2 mmol/L 22   < > 24   BUN mg/dL 101*   < > 73* CREATININE mg/dL 7 74*   < > 5 29*   CALCIUM mg/dL 7 6*   < > 8 0*   ALK PHOS U/L  --   --  71   ALT U/L  --   --  10*   AST U/L  --   --  23    < > = values in this interval not displayed  Results from last 7 days   Lab Units 02/14/19  0754   INR  3 41*       * I Have Reviewed All Lab Data Listed Above  * Additional Pertinent Lab Tests Reviewed:  [address] Admission Reviewed    Imaging:    Imaging Reports Reviewed Today Include:   Imaging Personally Reviewed by Myself Includes:     Recent Cultures (last 7 days):           Last 24 Hours Medication List:     Current Facility-Administered Medications:  acetaminophen 650 mg Oral Q6H PRN Horacio Yee MD   amiodarone 200 mg Oral Daily With Breakfast Aris Borja MD   aspirin 81 mg Oral Daily Cathryne Sicard, DO   atorvastatin 40 mg Oral Daily With Extend Labs,    bisacodyl 10 mg Rectal Daily PRN Kayce Jorge PA-C   calcium acetate 1,334 mg Oral BID Tony Croft MD   cefazolin 2,000 mg Intravenous After Dialysis Cynthia Gtz MD   cefazolin 3,000 mg Intravenous After Dialysis Cynthia Gtz MD   guaiFENesin 600 mg Oral Q12H [facility] Aissatou Schreiber PA-C   HYDROmorphone 1 mg Intravenous Q3H PRN Lidya Martinez   ipratropium 0 5 mg Nebulization Q6H PRN Ira Galloway MD   levalbuterol 1 25 mg Nebulization Q6H PRN Ira Galloway MD   metoprolol 5 mg Intravenous Q6H PRN Savannah Oro DO   metoprolol succinate 75 mg Oral Q12H Raman Adkins MD   nystatin  Topical BID Kayce Jorge PA-C   oxyCODONE 10 mg Oral Q4H PRN Horacio Yee MD   polyethylene glycol 17 g Oral Daily KATHY Mathias   polyvinyl alcohol 1 drop Both Eyes Q3H PRN Aylin Uriarte PA-C   QUEtiapine 25 mg Oral HS Aylin Uriarte PA-C   senna-docusate sodium 1 tablet Oral BID Cathryne Sicard, DO   torsemide 80 mg Oral Daily Corrina Sheehan DO        Today, Patient Was Seen By: Cathryne Sicard, DO    ** Please Note: This note has been constructed using a voice recognition system   **

## 2019-02-14 NOTE — PROGRESS NOTES
Cardiology Progress Note - Lisa Tang 60 y o  male MRN: [id number]    Unit/Bed#: [facility] 507-01 Encounter: [id number]      Assessment:  1  Heart failure with reduced EF, LVEF 35-40%, LVIDd 6 5 cm, BiV failure  · LHC - 7/2014 - normal coronaries  · Echo 1/2014 - LVEF 30%, severe diffuse hypokinesis, moderate concentric LVH, mildly dilated RV and moderate-severely reduced RV systolic function,         2  New-onset atrial fibrillation / Atrial flutter  3  Sepsis, possibly from skin ulcer / pneumonia  · Shock - resolved  · Blood culture initially 6/6+ for staph aureus, last blood cx 1/27/19 - 2/2 negative  · Sputum Cx - Staph aureus  4  MSSA bacteremia  5  Bioprosthetic AVR #25 (7/2014) with moderate bioprosthetic AS  6  NSTEMI type 2  · Troponins peaked at >40, ECG - old LBBB  7  Acute renal failure/ATN  · new dialysis initiation this admission, currently dependent  8  Anemia, Hb 7 4  9  Morbid Obesity, Body mass index is 54 01 kg/m²       Outpatient cardiologist: Jean Carlos Saxena  Heart failure with reduced EF, LVEF 35-40%  · EF drop from 50 to 35% in the setting of septic shock + atrial fibrillation/flutter  · Was on milrinone for a few days, but now remains off it for over 2 weeks  · Hemodynamically stable  · Still very volume overloaded  · Remains anuric, and completely dialysis dependent  · Got a trial dose of torsemide 80 (2/12/19), but remained anuric --> likely will be long-term dialysis dependent  · Continue metoprolol 75 bid, amiodarone 200  · Repeat limited echo to evaluate change in LV function    Atrial fibrillation/flutter, new onset  · Occurred in setting of sepsis  · On amiodarone 200, metoprolol tartarate 75 bid  · Back in Afib/flutter on telemetry, with short periods of rapid rates in 130s  · Change to metoprolol succinate 75 bid  · Check ECG  · On coumadin for [facility]  · Will benefit from cardioversion once a bit more euvolemic  · INR supratherapeutic since 2/9 - coumadin dose held        Subjective: No significant events overnight  Has been mostly bed bound    Review of Systems  No c/o chest pain, No c/o palpitations, No c/o shortness of breath, No c/o dizziness or light-headedness, No c/o abdominal pain, no c/o nausea/vomitting  All other systems negative    Telemetry Review: No significant arrhythmias seen on telemetry review    Objective:   Vitals: Blood pressure 118/65, pulse 83, temperature 98 8 °F (37 1 °C), temperature source Oral, resp  rate 18, height 5' 9" (1 753 m), weight (!) 166 kg (365 lb 11 9 oz), SpO2 93 %  , Body mass index is 54 01 kg/m² ,   Orthostatic Blood Pressures      Most Recent Value   Blood Pressure  118/65 filed at 02/14/2019 0714   Patient Position - Orthostatic VS  Lying filed at 02/14/2019 3789         Systolic (40JAE), GBJ:122 , Min:118 , UJA:311     Diastolic (53FXM), KCA:91, Min:58, Max:77    Wt Readings from Last 5 Encounters:   02/14/19 (!) 166 kg (365 lb 11 9 oz)   01/24/19 (!) 154 kg (339 lb 8 1 oz)   06/11/18 (!) 155 kg (341 lb)   04/11/18 (!) 155 kg (341 lb)   12/20/17 (!) 148 kg (325 lb 8 oz)     I/O       02/10 0701 - 02/11 0700 02/11 0701 - 02/12 0700 02/12 0701 - 02/13 0700    P  O  440 820 0    I V  (mL/kg)  300 (1 9)     IV Piggyback 50      Total Intake(mL/kg) 490 (3 5) 1120 (7) 0 (0)    Urine (mL/kg/hr) 0 (0) 0 (0)     Other  3300     Total Output 0 3300     Net +490 -2180 0                     Physical Exam   Constitutional: He is oriented to person, place, and time  He appears well-developed and well-nourished  No distress  Neck: Neck supple  JVD present  Cardiovascular: Normal rate, regular rhythm and normal heart sounds  Exam reveals no gallop and no friction rub  No murmur heard  Pulmonary/Chest: Effort normal and breath sounds normal  No respiratory distress  He has no wheezes  He has no rales  He exhibits no tenderness  Abdominal: Soft  He exhibits no distension  There is no tenderness  Musculoskeletal: He exhibits edema   He exhibits no tenderness  Neurological: He is alert and oriented to person, place, and time  Skin: Skin is warm  Psychiatric: He has a normal mood and affect  Vitals reviewed  Laboratory Results:        CBC with diff:   Results from last 7 days   Lab Units 02/12/19  0501 02/11/19  0525 02/10/19  0456 02/09/19  0548 02/08/19  0525   WBC Thousand/uL 10 13 11 42* 11 02* 11 60* 13 04*   HEMOGLOBIN g/dL 7 4* 7 7* 7 5* 7 5* 7 8*   HEMATOCRIT % 24 3* 24 7* 24 7* 25 0* 25 8*   MCV fL 86 87 88 87 88   PLATELETS Thousands/uL 212 215 234 234 260   MCH pg 26 2* 27 2 26 7* 26 1* 26 4*   MCHC g/dL 30 5* 31 2* 30 4* 30 0* 30 2*   RDW % 17 7* 17 6* 18 1* 17 9* 18 2*   MPV fL 11 0 11 6 12 0 11 9 11 8   NRBC AUTO /100 WBCs 0 0 0 0 0         CMP:  Results from last 7 days   Lab Units 02/13/19  0509 02/12/19  0501 02/11/19  0525 02/09/19  1113 02/09/19  0548 02/08/19  0525   POTASSIUM mmol/L 4 9 4 6 5 1 4 2 4 2 4 6   CHLORIDE mmol/L 92* 94* 95* 95* 95* 94*   CO2 mmol/L 22 24 22 25 24 23   BUN mg/dL 101* 86* 118* 82* 73* 87*   CREATININE mg/dL 7 74* 6 64* 7 87* 5 66* 5 29* 5 84*   CALCIUM mg/dL 7 6* 7 7* 7 9* 8 1* 8 0* 8 1*   AST U/L  --   --   --   --  23  --    ALT U/L  --   --   --   --  10*  --    ALK PHOS U/L  --   --   --   --  71  --    EGFR ml/min/1 73sq m 7 8 7 10 11 10         BMP:  Results from last 7 days   Lab Units 02/13/19  0509 02/12/19  0501 02/11/19  0525 02/09/19  1113 02/09/19  0548 02/08/19  0525   POTASSIUM mmol/L 4 9 4 6 5 1 4 2 4 2 4 6   CHLORIDE mmol/L 92* 94* 95* 95* 95* 94*   CO2 mmol/L 22 24 22 25 24 23   BUN mg/dL 101* 86* 118* 82* 73* 87*   CREATININE mg/dL 7 74* 6 64* 7 87* 5 66* 5 29* 5 84*   CALCIUM mg/dL 7 6* 7 7* 7 9* 8 1* 8 0* 8 1*       BNP: No results for input(s): BNP in the last 72 hours      Magnesium:   Results from last 7 days   Lab Units 02/12/19  0501 02/11/19  0525 02/10/19  0456 02/09/19  0548 02/08/19  0525   MAGNESIUM mg/dL 2 7* 2 8* 2 8* 2 6 2 8*       Coags:   Results from last 7 days Lab Units 02/14/19  0754 02/14/19  7793 02/13/19  0509 02/12/19  0501 02/11/19  0525 02/10/19  0456 02/09/19  0548 02/08/19  2248 02/08/19  1432  02/08/19  0525   PTT seconds  --   --   --   --   --   --   --  79* 81*  --  110*   INR  3 41* 3 47* 4 06* 3 93* 5 71* 3 61* 1 68*  --   --    < >  --     < > = values in this interval not displayed         TSH:        Hemoglobin A1C   Results from last 7 days   Lab Units 02/13/19  0509   HEMOGLOBIN A1C % 6 2       Lipid Profile:   Results from last 7 days   Lab Units 02/13/19  0509   TRIGLYCERIDES mg/dL 129   HDL mg/dL 44       Meds/Allergies   all current active meds have been reviewed and current meds:   Current Facility-Administered Medications   Medication Dose Route Frequency    acetaminophen (TYLENOL) tablet 650 mg  650 mg Oral Q6H PRN    amiodarone tablet 200 mg  200 mg Oral Daily With Breakfast    aspirin (ECOTRIN LOW STRENGTH) EC tablet 81 mg  81 mg Oral Daily    atorvastatin (LIPITOR) tablet 40 mg  40 mg Oral Daily With Dinner    bisacodyl (DULCOLAX) rectal suppository 10 mg  10 mg Rectal Daily PRN    calcium acetate (PHOSLO) capsule 1,334 mg  1,334 mg Oral BID    ceFAZolin (ANCEF) 2,000 mg in sodium chloride 0 9 % 50 mL IVPB  2,000 mg Intravenous After Dialysis    ceFAZolin (ANCEF) 3,000 mg in dextrose 5 % 100 mL IVPB  3,000 mg Intravenous After Dialysis    guaiFENesin (MUCINEX) 12 hr tablet 600 mg  600 mg Oral Q12H ARACELIS    HYDROmorphone (DILAUDID) injection 1 mg  1 mg Intravenous Q3H PRN    ipratropium (ATROVENT) 0 02 % inhalation solution 0 5 mg  0 5 mg Nebulization Q6H PRN    levalbuterol (XOPENEX) inhalation solution 1 25 mg  1 25 mg Nebulization Q6H PRN    metoprolol (LOPRESSOR) injection 5 mg  5 mg Intravenous Q6H PRN    metoprolol tartrate (LOPRESSOR) tablet 75 mg  75 mg Oral Q12H ARACELIS    nystatin (MYCOSTATIN) powder   Topical BID    oxyCODONE (ROXICODONE) immediate release tablet 10 mg  10 mg Oral Q4H PRN    polyethylene glycol (MIRALAX) packet 17 g  17 g Oral Daily    polyvinyl alcohol (LIQUIFILM TEARS) 1 4 % ophthalmic solution 1 drop  1 drop Both Eyes Q3H PRN    QUEtiapine (SEROquel) tablet 25 mg  25 mg Oral HS    senna-docusate sodium (SENOKOT S) 8 6-50 mg per tablet 1 tablet  1 tablet Oral BID    torsemide (DEMADEX) tablet 80 mg  80 mg Oral Daily     Medications Prior to Admission   Medication    amLODIPine (NORVASC) 5 mg tablet    ascorbic acid (VITAMIN C) 500 MG tablet    aspirin (ECOTRIN) 325 mg EC tablet    fluticasone (FLONASE) 50 mcg/act nasal spray    loratadine (CLARITIN) 10 mg tablet    metoprolol tartrate (LOPRESSOR) 100 mg tablet    potassium chloride (K-DUR,KLOR-CON) 20 mEq tablet    pravastatin (PRAVACHOL) 40 mg tablet    torsemide (DEMADEX) 20 mg tablet            Cardiac testing:   Results for orders placed during the hospital encounter of 19   Echo complete with contrast if indicated    Narrative [address] [facility] [address] [facility], [facility] 34  [phone]    Transthoracic Echocardiogram  2D, Doppler, and Color Doppler    Study date:  2019    Patient: Alfreda Rodriguez  MR number: [id number]  Account number: [de-identified]  : 1960  Age: 60 years  Gender: Male  Status: Inpatient  Location: Bedside  Height: 72 in  Weight: 339 lb  BP: 89/ 54 mmHg    Indications: chest pain    Diagnoses: R07 9 - Chest pain, unspecified    Sonographer:  Yonathan Noble RDCS, CCT  Primary Physician:  Bairon Christianson DO  Referring Physician:  Anabel Navarro DO  Group:  [facility]  Interpreting Physician:  Elba Madrid DO    SUMMARY    PROCEDURE INFORMATION:  This was a technically difficult study despite the administration of echo contrast     LEFT VENTRICLE:  Systolic function was markedly reduced  Ejection fraction was estimated to be 30 %  There was severe diffuse hypokinesis with no obvious identifiable regional variations    Wall thickness was moderately increased  Concentric hypertrophy was present  VENTRICULAR SEPTUM:  There was dyssynergic motion  RIGHT VENTRICLE:  The ventricle was mildly dilated  Systolic function was moderately to markedly reduced  LEFT ATRIUM:  The atrium was mildly dilated  RIGHT ATRIUM:  The atrium was mildly dilated  AORTIC VALVE:  A bioprosthesis was present  It was not well visualized  Mean transvalvular gradient 13 mmHg  TRICUSPID VALVE:  There was mild regurgitation  PERICARDIUM:  A small, partially loculated pericardial effusion was identified along the left ventricular free wall  HISTORY: PRIOR HISTORY: Aortic valve prosthesis    PROCEDURE: The procedure was performed at the bedside  This was a routine study  The transthoracic approach was used  The study included complete 2D imaging, complete spectral Doppler, and color Doppler  The heart rate was 80 bpm, at the  start of the study  Intravenous contrast (Definity solution [1 3 ml Definity/8 7ml normal saline solution]) was administered  Intravenous contrast (Definity solution [1 3 ml Definity/8 7ml normal saline solution]) was administered to  opacify the left ventricle and enhance Doppler signals  Echocardiographic views were limited due to low windows and patient on mechanical ventilator  This was a technically difficult study despite the administration of echo contrast     LEFT VENTRICLE: Size was normal  Systolic function was markedly reduced  Ejection fraction was estimated to be 30 %  There was severe diffuse hypokinesis with no obvious identifiable regional variations  Wall thickness was moderately  increased  Concentric hypertrophy was present  DOPPLER: Normal sinus rhythm was absent  The study was not technically sufficient to allow evaluation of LV diastolic function  VENTRICULAR SEPTUM: There was dyssynergic motion  RIGHT VENTRICLE: The ventricle was mildly dilated  Systolic function was moderately to markedly reduced      LEFT ATRIUM: The atrium was mildly dilated  RIGHT ATRIUM: The atrium was mildly dilated  MITRAL VALVE: Not well visualized  DOPPLER: The transmitral velocity was within the normal range  There was no evidence for stenosis  There was no significant regurgitation  AORTIC VALVE: A bioprosthesis was present  It was not well visualized  Mean transvalvular gradient 13 mmHg  DOPPLER: There was no significant regurgitation  TRICUSPID VALVE: The valve structure was normal  There was normal leaflet separation  DOPPLER: The transtricuspid velocity was within the normal range  There was no evidence for stenosis  There was mild regurgitation  The tricuspid jet  envelope definition was inadequate for estimation of RV systolic pressure  PULMONIC VALVE: Leaflets exhibited normal thickness, no calcification, and normal cuspal separation  DOPPLER: The transpulmonic velocity was within the normal range  There was no significant regurgitation  PERICARDIUM: A small, partially loculated pericardial effusion was identified along the left ventricular free wall  The pericardium was normal in appearance  AORTA: The root exhibited normal size  SYSTEM MEASUREMENT TABLES    2D  %FS: 15 83 %  Ao Diam: 3 09 cm  EDV(Teich): 221 88 ml  EF(Teich): 32 54 %  ESV(Teich): 149 69 ml  IVSd: 1 59 cm  LA Diam: 5 18 cm  LVIDd: 6 58 cm  LVIDs: 5 54 cm  LVPWd: 1 43 cm  SV(Teich): 72 19 ml    CW  AV Env  Ti: 233 56 ms  AV VTI: 49 77 cm  AV Vmax: 2 64 m/s  AV Vmax: 2 67 m/s  AV Vmean: 2 13 m/s  AV maxP 95 mmHg  AV maxP 53 mmHg  AV meanP 73 mmHg    PW  LVOT Env  Ti: 215 22 ms  LVOT VTI: 11 41 cm  LVOT Vmax: 0 59 m/s  LVOT Vmax: 0 7 m/s  LVOT Vmean: 0 52 m/s  LVOT maxP 78 mmHg  LVOT maxP mmHg  LVOT meanP 24 mmHg  MV E Deep: 1 01 m/s    [employer] Accredited Echocardiography Laboratory    Prepared and electronically signed by    Mine Elaine DO  Signed 2019 10:14:55       Results for orders placed during the hospital encounter of 19   CHON    Narrative [address]  [facility], [address] [facility]  [phone]    Transesophageal Echocardiogram  2D, Doppler, and Color Doppler    Study date:  2019    Patient: Nicole Lemon  MR number: [id number]  Account number: [de-identified]  : 1960  Age: 60 years  Gender: Male  Status: Inpatient  Location: Bedside  Height: 69 in  Weight: 319 lb  BP: 101/ 54 mmHg    Indications: Bacteremia  Diagnoses: R78 81 - Bacteremia    Sonographer:  Funmi Solis RDCS  Interpreting Physician:  Yakelin Montoya DO  Primary Physician:  Thierry Carney DO  Referring Physician:  Subhash Valle DO  Group:  [id number]  Cardiology Fellow:  John Santiago MD    IMPRESSIONS:  There was no echocardiographic evidence for valvular vegetation  SUMMARY    LEFT VENTRICLE:  Systolic function was moderately reduced  Ejection fraction was estimated to be 40 %  There was moderate diffuse hypokinesis  Wall thickness was mildly increased  There was mild concentric hypertrophy  RIGHT VENTRICLE:  The size was normal   Systolic function was mildly reduced  LEFT ATRIUM:  The atrium was mildly dilated  ATRIAL SEPTUM:  There was a small patent foramen ovale with left to right shunt identified by color Doppler  RIGHT ATRIUM:  The atrium was mildly dilated  MITRAL VALVE:  There was trace regurgitation  There was no echocardiographic evidence of vegetation  AORTIC VALVE:  A bioprosthesis was present  It exhibited normal function  There was no echocardiographic evidence of vegetation  TRICUSPID VALVE:  There was mild regurgitation  There was no echocardiographic evidence of vegetation  HISTORY: PRIOR HISTORY: Aortic valve replacement, NSTEMI, Cardiomyopathy, Acute kidney injury  PROCEDURE: The procedure was performed at the bedside  This was a routine study   The risks and alternatives of the procedure were explained to the patient's next of kin and informed consent was obtained  The transesophageal approach was  used  The study included complete 2D imaging, complete spectral Doppler, and color Doppler  The heart rate was 113 bpm, at the start of the study  An adult omniplane probe was inserted by the cardiology fellow under direct supervision of  the attending cardiologist  Intubated with ease  One intubation attempt(s)  There was no blood detected on the probe  Image quality was adequate  There were no complications during the procedure  MEDICATIONS: Sedation administered by  bedside nurse  LEFT VENTRICLE: Size was normal  Systolic function was moderately reduced  Ejection fraction was estimated to be 40 %  There was moderate diffuse hypokinesis  Wall thickness was mildly increased  There was mild concentric hypertrophy  DOPPLER: The study was not technically sufficient to allow evaluation of LV diastolic function  RIGHT VENTRICLE: The size was normal  Systolic function was mildly reduced  Wall thickness was normal     LEFT ATRIUM: The atrium was mildly dilated  No thrombus was identified  APPENDAGE: The appendage was small  No thrombus was identified  DOPPLER: The function was normal (normal emptying velocity)  ATRIAL SEPTUM: There was a small patent foramen ovale with left to right shunt identified by color Doppler  RIGHT ATRIUM: The atrium was mildly dilated  No thrombus was identified  MITRAL VALVE: Valve structure was normal  There was normal leaflet separation  There was no echocardiographic evidence of vegetation  DOPPLER: There was trace regurgitation  AORTIC VALVE: A bioprosthesis was present  It exhibited normal function  There was no echocardiographic evidence of vegetation  TRICUSPID VALVE: The valve structure was normal  There was normal leaflet separation  There was no echocardiographic evidence of vegetation  DOPPLER: There was mild regurgitation      PULMONIC VALVE: Leaflets exhibited normal thickness, no calcification, and normal cuspal separation  There was no echocardiographic evidence of vegetation  DOPPLER: There was no significant regurgitation  PERICARDIUM: There was no pericardial effusion seen in the images obtained  AORTA: The root exhibited normal size  There was no atheroma  There was no evidence for dissection  There was no evidence for aneurysm  [facility]    Prepared and electronically signed by    Alli Perez DO  Signed 25-Jan-2019 14:01:14       No results found for this or any previous visit  No results found for this or any previous visit  Counseling / Coordination of Care  Total floor / unit time spent today 20 minutes  Greater than 50% of total time was spent with the patient and / or family counseling and / or coordination of care  A description of the counseling / coordination of care: Obtained history, performed physical examination, discussed laboratory and imaging results, and explained further plan of care  Rafaela Shah

## 2019-02-15 ENCOUNTER — APPOINTMENT (INPATIENT)
Dept: DIALYSIS | Facility: HOSPITAL | Age: 59
DRG: 871 | End: 2019-02-15
Payer: COMMERCIAL

## 2019-02-15 LAB
ALBUMIN SERPL BCP-MCNC: 2.3 G/DL (ref 3.5–5)
ALP SERPL-CCNC: 72 U/L (ref 46–116)
ALT SERPL W P-5'-P-CCNC: 7 U/L (ref 12–78)
ANION GAP SERPL CALCULATED.3IONS-SCNC: 14 MMOL/L (ref 4–13)
AST SERPL W P-5'-P-CCNC: 21 U/L (ref 5–45)
BILIRUB SERPL-MCNC: 0.6 MG/DL (ref 0.2–1)
BUN SERPL-MCNC: 93 MG/DL (ref 5–25)
CALCIUM SERPL-MCNC: 8.2 MG/DL (ref 8.3–10.1)
CHLORIDE SERPL-SCNC: 93 MMOL/L (ref 100–108)
CO2 SERPL-SCNC: 25 MMOL/L (ref 21–32)
CREAT SERPL-MCNC: 7.14 MG/DL (ref 0.6–1.3)
GFR SERPL CREATININE-BSD FRML MDRD: 8 ML/MIN/1.73SQ M
GLUCOSE SERPL-MCNC: 100 MG/DL (ref 65–140)
INR PPP: 3.29 (ref 0.86–1.17)
POTASSIUM SERPL-SCNC: 4.2 MMOL/L (ref 3.5–5.3)
PROT SERPL-MCNC: 6.5 G/DL (ref 6.4–8.2)
PROTHROMBIN TIME: 33.5 SECONDS (ref 11.8–14.2)
SODIUM SERPL-SCNC: 132 MMOL/L (ref 136–145)

## 2019-02-15 PROCEDURE — 99232 SBSQ HOSP IP/OBS MODERATE 35: CPT | Performed by: INTERNAL MEDICINE

## 2019-02-15 PROCEDURE — 85610 PROTHROMBIN TIME: CPT | Performed by: INTERNAL MEDICINE

## 2019-02-15 PROCEDURE — 80053 COMPREHEN METABOLIC PANEL: CPT | Performed by: INTERNAL MEDICINE

## 2019-02-15 RX ORDER — DIPHENHYDRAMINE HCL 25 MG
25 TABLET ORAL EVERY 6 HOURS PRN
Status: DISCONTINUED | OUTPATIENT
Start: 2019-02-15 | End: 2019-02-21

## 2019-02-15 RX ADMIN — ATORVASTATIN CALCIUM 40 MG: 40 TABLET, FILM COATED ORAL at 18:13

## 2019-02-15 RX ADMIN — CALCIUM ACETATE 1334 MG: 667 CAPSULE ORAL at 18:13

## 2019-02-15 RX ADMIN — OXYCODONE HYDROCHLORIDE 10 MG: 10 TABLET ORAL at 08:01

## 2019-02-15 RX ADMIN — NYSTATIN: 100000 POWDER TOPICAL at 18:13

## 2019-02-15 RX ADMIN — POLYETHYLENE GLYCOL 3350 17 G: 17 POWDER, FOR SOLUTION ORAL at 08:06

## 2019-02-15 RX ADMIN — SENNOSIDES AND DOCUSATE SODIUM 1 TABLET: 8.6; 5 TABLET ORAL at 08:01

## 2019-02-15 RX ADMIN — CALCIUM ACETATE 1334 MG: 667 CAPSULE ORAL at 12:04

## 2019-02-15 RX ADMIN — METOPROLOL SUCCINATE 75 MG: 50 TABLET, EXTENDED RELEASE ORAL at 09:46

## 2019-02-15 RX ADMIN — AMIODARONE HYDROCHLORIDE 200 MG: 200 TABLET ORAL at 08:01

## 2019-02-15 RX ADMIN — GUAIFENESIN 600 MG: 600 TABLET, EXTENDED RELEASE ORAL at 21:38

## 2019-02-15 RX ADMIN — QUETIAPINE FUMARATE 25 MG: 25 TABLET ORAL at 21:38

## 2019-02-15 RX ADMIN — DIPHENHYDRAMINE HCL 25 MG: 25 TABLET ORAL at 18:13

## 2019-02-15 RX ADMIN — NYSTATIN: 100000 POWDER TOPICAL at 08:13

## 2019-02-15 RX ADMIN — SENNOSIDES AND DOCUSATE SODIUM 1 TABLET: 8.6; 5 TABLET ORAL at 18:13

## 2019-02-15 RX ADMIN — CALCIUM ACETATE 1334 MG: 667 CAPSULE ORAL at 08:01

## 2019-02-15 RX ADMIN — ASPIRIN 81 MG: 81 TABLET, COATED ORAL at 08:01

## 2019-02-15 RX ADMIN — TORSEMIDE 80 MG: 20 TABLET ORAL at 08:01

## 2019-02-15 RX ADMIN — CEFAZOLIN SODIUM 3000 MG: 1 INJECTION, POWDER, FOR SOLUTION INTRAMUSCULAR; INTRAVENOUS at 17:04

## 2019-02-15 RX ADMIN — GUAIFENESIN 600 MG: 600 TABLET, EXTENDED RELEASE ORAL at 08:01

## 2019-02-15 NOTE — PROGRESS NOTES
NEPHROLOGY PROGRESS NOTE   Terrence Hawkins 60 y o  male MRN: [id number]  Unit/Bed#: [facility] 507-01 Encounter: [id number]  Reason for Consult:  Acute kidney injury    ASSESSMENT/PLAN:  1  Acute kidney injury, likely secondary to ATN, patient remains oliguric, no signs of recovery, continue with hemodialysis Monday Wednesday Friday  2  MSSA bacteremia to complete 6 weeks of antibiotic treatment  3  History of bioprosthetic valve, aortic, no evidence of vegetation  4  Atrial fibrillation  5  Encephalopathy with waxing and waning features  6  Anemia of chronic disease  7  Cardiomyopathy with an ejection fraction of 30%  8  Mineral bone disorder, continue with PhosLo     PLAN:  · Maintenance hemodialysis Monday Wednesday Friday  · Continue to challenge dry weight with hemodialysis  · No signs of recovery    Addendum  Patient unfortunately had removed his hemodialysis catheter the evening prior  Placed IR consult for replacement of PermCath  SUBJECTIVE:  Seen examined  Patient currently on hemodialysis  Denies any chest pain or shortness of Breath  Appears slightly more awake alert today  No abdominal pain  Review of Systems    OBJECTIVE:  Current Weight: Weight - Scale: (!) 159 kg (350 lb 1 5 oz)  Vitals:    02/15/19 1321 02/15/19 1339 02/15/19 1345 02/15/19 1400   BP: 127/68 132/59 121/65 124/58   BP Location:       Pulse: 65 67 65 72   Resp: (!) 23      Temp: 97 8 °F (36 6 °C)      TempSrc: Tympanic      SpO2:       Weight:       Height:           Intake/Output Summary (Last 24 hours) at 2/15/2019 1420  Last data filed at 2/15/2019 1339  Gross per 24 hour   Intake 773 ml   Output 0 ml   Net 773 ml       Physical Exam   Constitutional: No distress  HENT:   Head: Normocephalic  Eyes: No scleral icterus  Neck: Neck supple  Cardiovascular: Normal rate and regular rhythm  Pulmonary/Chest: Effort normal  He has decreased breath sounds in the right lower field and the left lower field     Musculoskeletal: He exhibits edema (2+ edema)  Neurological: He is alert  Skin: Skin is warm         Medications:    Current Facility-Administered Medications:     acetaminophen (TYLENOL) tablet 650 mg, 650 mg, Oral, Q6H PRN, Concepcion Santoyo MD, 650 mg at 02/13/19 1732    amiodarone tablet 200 mg, 200 mg, Oral, Daily With Breakfast, Miki Shin MD, 200 mg at 02/15/19 0801    aspirin (ECOTRIN LOW STRENGTH) EC tablet 81 mg, 81 mg, Oral, Daily, Doristine Area, DO, 81 mg at 02/15/19 0801    atorvastatin (LIPITOR) tablet 40 mg, 40 mg, Oral, Daily With Kyung Quails, DO, 40 mg at 02/14/19 1635    bisacodyl (DULCOLAX) rectal suppository 10 mg, 10 mg, Rectal, Daily PRN, Francy Landa PA-C    calcium acetate (PHOSLO) capsule 1,334 mg, 1,334 mg, Oral, TID With Meals, Jesus Quintanilla DO, 1,334 mg at 02/15/19 1204    [START ON 2/18/2019] ceFAZolin (ANCEF) 2,000 mg in sodium chloride 0 9 % 50 mL IVPB, 2,000 mg, Intravenous, Once per day on Mon Wed **AND** ceFAZolin (ANCEF) 3,000 mg in dextrose 5 % 100 mL IVPB, 3,000 mg, Intravenous, Once per day on Fri, Kalani Cerrato MD    [facility]) 12 hr tablet 600 mg, 600 mg, Oral, Q12H [address], Olive Umanzor PA-C, 600 mg at 02/15/19 0801    HYDROmorphone (DILAUDID) injection 1 mg, 1 mg, Intravenous, Q3H PRN, Lidya Martinez, 1 mg at 02/11/19 1600    ipratropium (ATROVENT) 0 02 % inhalation solution 0 5 mg, 0 5 mg, Nebulization, Q6H PRN, Antoinette Cabral MD    levalbuterol [facility]) inhalation solution 1 25 mg, 1 25 mg, Nebulization, Q6H PRN, Antoinette Cabral MD    metoprolol (LOPRESSOR) injection 5 mg, 5 mg, Intravenous, Q6H PRN, Savannah Oro DO, 5 mg at 02/11/19 1559    metoprolol succinate (TOPROL-XL) 24 hr tablet 75 mg, 75 mg, Oral, Q12H, Raman Hinojosa MD, 75 mg at 02/15/19 0946    nystatin (MYCOSTATIN) powder, , Topical, BID, Francy Landa PA-C    oxyCODONE (ROXICODONE) immediate release tablet 10 mg, 10 mg, Oral, Q4H PRN, Hoang Angry MD Rubina, 10 mg at 02/15/19 0801    polyethylene glycol (MIRALAX) packet 17 g, 17 g, Oral, Daily, KATHY Mathias, 17 g at 02/15/19 0806    polyvinyl alcohol (LIQUIFILM TEARS) 1 4 % ophthalmic solution 1 drop, 1 drop, Both Eyes, Q3H PRN, Ross Little PA-C, 1 drop at 02/13/19 1724    QUEtiapine (SEROquel) tablet 25 mg, 25 mg, Oral, HS, Ross Little PA-C, 25 mg at 02/14/19 2242    senna-docusate sodium (SENOKOT S) 8 6-50 mg per tablet 1 tablet, 1 tablet, Oral, BID, Gendanny Briones DO, 1 tablet at 02/15/19 0801    torsemide [facility]) tablet 80 mg, 80 mg, Oral, Daily, Nyasia Quintanilla DO, 80 mg at 02/15/19 0801    Laboratory Results:  Results from last 7 days   Lab Units 02/13/19  0509 02/12/19  0501 02/11/19  0525 02/10/19  0456 02/09/19  1113 02/09/19  0548   WBC Thousand/uL  --  10 13 11 42* 11 02*  --  11 60*   HEMOGLOBIN g/dL  --  7 4* 7 7* 7 5*  --  7 5*   HEMATOCRIT %  --  24 3* 24 7* 24 7*  --  25 0*   PLATELETS Thousands/uL  --  212 215 234  --  234   POTASSIUM mmol/L 4 9 4 6 5 1  --  4 2 4 2   CHLORIDE mmol/L 92* 94* 95*  --  95* 95*   CO2 mmol/L 22 24 22  --  25 24   BUN mg/dL 101* 86* 118*  --  82* 73*   CREATININE mg/dL 7 74* 6 64* 7 87*  --  5 66* 5 29*   CALCIUM mg/dL 7 6* 7 7* 7 9*  --  8 1* 8 0*   MAGNESIUM mg/dL  --  2 7* 2 8* 2 8*  --  2 6   PHOSPHORUS mg/dL  --  8 9* 10 6* 8 9*  --  8 6*

## 2019-02-15 NOTE — PLAN OF CARE
Problem: Potential for Falls  Goal: Patient will remain free of falls  Description  INTERVENTIONS:  - Assess patient frequently for physical needs  -  Identify cognitive and physical deficits and behaviors that affect risk of falls  -  [place] fall precautions as indicated by assessment   - Educate patient/family on patient safety including physical limitations  - Instruct patient to call for assistance with activity based on assessment  - Modify environment to reduce risk of injury  - Consider OT/PT consult to assist with strengthening/mobility    Outcome: Progressing     Problem: Prexisting or High Potential for Compromised Skin Integrity  Goal: Skin integrity is maintained or improved  Description  INTERVENTIONS:  - Identify patients at risk for skin breakdown  - Assess and monitor skin integrity  - Assess and monitor nutrition and hydration status  - Monitor labs (i e  albumin)  - Assess for incontinence   - Turn and reposition patient  - Assist with mobility/ambulation  - Relieve pressure over bony prominences  - Avoid friction and shearing  - Provide appropriate hygiene as needed including keeping skin clean and dry  - Evaluate need for skin moisturizer/barrier cream  - Collaborate with interdisciplinary team (i e  Nutrition, Rehabilitation, etc )   - Patient/family teaching   Outcome: Progressing     Problem: Nutrition/Hydration-ADULT  Goal: Nutrient/Hydration intake appropriate for improving, restoring or maintaining nutritional needs  Description  Monitor and assess patient's nutrition/hydration status for malnutrition (ex- brittle hair, bruises, dry skin, pale skin and conjunctiva, muscle wasting, smooth red tongue, and disorientation)  Collaborate with interdisciplinary team and initiate plan and interventions as ordered  Monitor patient's weight and dietary intake as ordered or per policy  Utilize nutrition screening tool and intervene per policy   Determine patient's food preferences and provide high-protein, high-caloric foods as appropriate  INTERVENTIONS:  - Monitor oral intake, urinary output, labs, and treatment plans  - Assess nutrition and hydration status and recommend course of action  - Evaluate amount of meals eaten  - Assist patient with eating if necessary   - Allow adequate time for meals  - Recommend/ encourage appropriate diets, oral nutritional supplements, and vitamin/mineral supplements  - Order, calculate, and assess calorie counts as needed  - Recommend, monitor, and adjust tube feedings and TPN/PPN based on assessed needs  - Assess need for intravenous fluids  - Provide specific nutrition/hydration education as appropriate  - Include patient/family/caregiver in decisions related to nutrition   Outcome: Progressing     Problem: CARDIOVASCULAR - ADULT  Goal: Maintains optimal cardiac output and hemodynamic stability  Description  INTERVENTIONS:  - Monitor I/O, vital signs and rhythm  - Monitor for S/S and trends of decreased cardiac output i e  bleeding, hypotension  - Administer and titrate ordered vasoactive medications to optimize hemodynamic stability  - Assess quality of pulses, skin color and temperature  - Assess for signs of decreased coronary artery perfusion - ex   Angina  - Instruct patient to report change in severity of symptoms   Outcome: Progressing  Goal: Absence of cardiac dysrhythmias or at baseline rhythm  Description  INTERVENTIONS:  - Continuous cardiac monitoring, monitor vital signs, obtain 12 lead EKG if indicated  - Administer antiarrhythmic and heart rate control medications as ordered  - Monitor electrolytes and administer replacement therapy as ordered   Outcome: Progressing     Problem: METABOLIC, FLUID AND ELECTROLYTES - ADULT  Goal: Electrolytes maintained within normal limits  Description  INTERVENTIONS:  - Monitor labs and assess patient for signs and symptoms of electrolyte imbalances  - Administer electrolyte replacement as ordered  - Monitor response to electrolyte replacements, including repeat lab results as appropriate  - Instruct patient on fluid and nutrition as appropriate   Outcome: Progressing  Goal: Fluid balance maintained  Description  INTERVENTIONS:  - Monitor labs and assess for signs and symptoms of volume excess or deficit  - Monitor I/O and WT  - Instruct patient on fluid and nutrition as appropriate   Outcome: Progressing     Problem: PAIN - ADULT  Goal: Verbalizes/displays adequate comfort level or baseline comfort level  Description  Interventions:  - Encourage patient to monitor pain and request assistance  - Assess pain using appropriate pain scale  - Administer analgesics based on type and severity of pain and evaluate response  - Implement non-pharmacological measures as appropriate and evaluate response  - Consider cultural and social influences on pain and pain management  - Notify physician/advanced practitioner if interventions unsuccessful or patient reports new pain   Outcome: Progressing     Problem: INFECTION - ADULT  Goal: Absence or prevention of progression during hospitalization  Description  INTERVENTIONS:  - Assess and monitor for signs and symptoms of infection  - Monitor lab/diagnostic results  - Monitor all insertion sites, i e  indwelling lines, tubes, and drains  - Monitor endotracheal (as able) and nasal secretions for changes in amount and color  - [place] appropriate cooling/warming therapies per order  - Administer medications as ordered  - Instruct and encourage patient and family to use good hand hygiene technique  - Identify and instruct in appropriate isolation precautions for identified infection/condition    Outcome: Progressing     Problem: SAFETY ADULT  Goal: Patient will remain free of falls  Description  INTERVENTIONS:  - Assess patient frequently for physical needs  -  Identify cognitive and physical deficits and behaviors that affect risk of falls    -  [place] fall precautions as indicated by assessment   - Educate patient/family on patient safety including physical limitations  - Instruct patient to call for assistance with activity based on assessment  - Modify environment to reduce risk of injury  - Consider OT/PT consult to assist with strengthening/mobility    Outcome: Progressing  Goal: Maintain or return to baseline ADL function  Description  INTERVENTIONS:  -  Assess patient's ability to carry out ADLs; assess patient's baseline for ADL function and identify physical deficits which impact ability to perform ADLs (bathing, care of mouth/teeth, toileting, grooming, dressing, etc )  - Assess/evaluate cause of self-care deficits   - Assess range of motion  - Assess patient's mobility; develop plan if impaired  - Assess patient's need for assistive devices and provide as appropriate  - Encourage maximum independence but intervene and supervise when necessary  ¯ Involve family in performance of ADLs  ¯ Assess for home care needs following discharge   ¯ Request OT consult to assist with ADL evaluation and planning for discharge  ¯ Provide patient education as appropriate    Outcome: Progressing  Goal: Maintain or return mobility status to optimal level  Description  INTERVENTIONS:  - Assess patient's baseline mobility status (ambulation, transfers, stairs, etc )    - Identify cognitive and physical deficits and behaviors that affect mobility  - Identify mobility aids required to assist with transfers and/or ambulation (gait belt, sit-to-stand, lift, walker, cane, etc )  - [place] fall precautions as indicated by assessment  - Record patient progress and toleration of activity level on Mobility SBAR; progress patient to next Phase/Stage  - Instruct patient to call for assistance with activity based on assessment  - Request Rehabilitation consult to assist with strengthening/weightbearing, etc     Outcome: Progressing     Problem: DISCHARGE PLANNING  Goal: Discharge to home or other facility with appropriate resources  Description  INTERVENTIONS:  - Identify barriers to discharge w/patient and caregiver  - Arrange for needed discharge resources and transportation as appropriate  - Identify discharge learning needs (meds, wound care, etc )  - Arrange for interpretive services to assist at discharge as needed  - Refer to Case Management Department for coordinating discharge planning if the patient needs post-hospital services based on physician/advanced practitioner order or complex needs related to functional status, cognitive ability, or social support system   Outcome: Progressing     Problem: GENITOURINARY - ADULT  Goal: Maintains or returns to baseline urinary function  Description  INTERVENTIONS:  - Assess urinary function  - Encourage oral fluids to ensure adequate hydration  - Administer IV fluids as ordered to ensure adequate hydration  - Administer ordered medications as needed  - Offer frequent toileting  - Follow urinary retention protocol if ordered   Outcome: Progressing  Goal: Absence of urinary retention  Description  INTERVENTIONS:  - Assess patient?s ability to void and empty bladder  - Monitor I/O  - Bladder scan as needed  - Discuss with physician/AP medications to alleviate retention as needed  - Discuss catheterization for long term situations as appropriate   Outcome: Progressing     Problem: DISCHARGE PLANNING - CARE MANAGEMENT  Goal: Discharge to post-acute care or home with appropriate resources  Description  INTERVENTIONS:  - Conduct assessment to determine patient/family and health care team treatment goals, and need for post-acute services based on payer coverage, community resources, and patient preferences, and barriers to discharge  - Address psychosocial, clinical, and financial barriers to discharge as identified in assessment in conjunction with the patient/family and health care team  - Arrange appropriate level of post-acute services according to patient's   needs and preference and payer coverage in collaboration with the physician and health care team  - Communicate with and update the patient/family, physician, and health care team regarding progress on the discharge plan  - Arrange appropriate transportation to post-acute venues  - Pt to d/c with appropriate resources when medically stable  Outcome: Progressing     Problem: Knowledge Deficit  Goal: Patient/family/caregiver demonstrates understanding of disease process, treatment plan, medications, and discharge instructions  Description  Complete learning assessment and assess knowledge base    Interventions:  - Provide teaching at level of understanding  - Provide teaching via preferred learning methods   Outcome: Not Progressing

## 2019-02-15 NOTE — SOCIAL WORK
Discussed patient with Donnell Jean Baptiste at [facility] admission [phone], ext [phone]  Reviewed therapy notes and explained patient is unable to transfer OOB or sit in recliner for HD treatment of 3-4 hours  Michelle Dumas has no bed treatments and case has been sent to Mary Guerrier for review  [facility] from [facility] admissions will follow

## 2019-02-15 NOTE — PROGRESS NOTES
[facility] Internal Medicine Progress Note  Patient: Brennon Baptiste 60 y o  male   MRN: [id number]  PCP: Author Seferino DO  [facility] Encounter: [id number]  Date Of Visit: 02/15/19    Assessment:    Principal Problem:    Staphylococcus aureus bacteremia  Active Problems:    Increased BMI    Stress-induced cardiomyopathy    Acute respiratory failure with hypoxia ([facility] [address] )    History of aortic valve replacement with bioprosthetic valve    Atrial fibrillation (HCC)    Transaminitis    Thrombocytopenia (HCC)    Anemia    Leukocytosis    Cerebrovascular accident ([facility] [address] )    [facility]    Hypervolemia      Plan:    1  MSSA bacteremia, negative CHON, negative repeat blood culture, per ID, plan for 6 weeks of antibiotic through 3/10  2  Recent shock, likely multifactorial,  cardiogenic/septic, resolved  3  PATRICK secondary to ATN, no sign of renal recovery,  now HD dependent on MWF, nephrology is following  4  Acute systolic CHF/cardiomyopathy with EF 30%, Still with volume overload,  cardiology is following, on Lopressor and Demadex, volume status managed by HD  5  S/p bioprosthetic AVR, negative CHON for vegetation  6  Acute hypoxic respiratory failure secondary to above, resolved,  s/p extubation, continue supportive care  7  New onset AFib, with fluctuating heart rate, supratherapeutic INR, coumadin still on hold, cardiology is following, continue amiodarone, follow on today's lab, restart tele  8  Non MI- elevated troponin  9  Toxic metabolic encephalopathy with abnormal head CT scan, evaluated by Neurology,  likely ischemic event bilaterally,  recommendation to repeat head CT scan in 2 weeks, continue aspirin and  Statin, check bilateral carotid ultrasound  10  Anemia of chronic disease, monitor  11   Morbid obesity      VTE Pharmacologic Prophylaxis:   Pharmacologic: Warfarin (Coumadin)  Mechanical VTE Prophylaxis in Place: Yes    Patient Centered Rounds: I have performed bedside rounds with nursing staff today  Discussions with Specialists or Other Care Team Provider:     Education and Discussions with Family / Patient:  Patient    Time Spent for Care: 30 minutes  More than 50% of total time spent on counseling and coordination of care as described above  Current Length of Stay: 22 day(s)    Current Patient Status: Inpatient   Certification Statement: The patient will continue to require additional inpatient hospital stay due to Management of PATRICK    Discharge Plan / Estimated Discharge Date:  Awaiting rehab placement    Code Status: Level 1 - Full Code      Subjective:   Patient seen and examined  Comfortable in bed  Fluctuating heart rate  Denied pain  Had a bowel movement  No nausea vomiting or diarrhea    Objective:     Vitals:   Temp (24hrs), Av 3 °F (36 8 °C), Min:97 2 °F (36 2 °C), Max:99 1 °F (37 3 °C)    Temp:  [97 2 °F (36 2 °C)-99 1 °F (37 3 °C)] 99 1 °F (37 3 °C)  HR:  [] 105  Resp:  [18-22] 18  BP: (104-134)/(59-92) 130/92  SpO2:  [86 %-99 %] 95 %  Body mass index is 51 7 kg/m²  Input and Output Summary (last 24 hours):        Intake/Output Summary (Last 24 hours) at 2/15/2019 0839  Last data filed at 2/15/2019 0805  Gross per 24 hour   Intake 1053 ml   Output 0 ml   Net 1053 ml       Physical Exam:     Physical Exam     Patient is awake in no acute distress  Disoriented  Obese  Lung with decreased breath sounds bilateral  Heart positive S1-S2 irregular,  no murmur  Abdomen soft obese distended nontender  Lower extremities edema    Additional Data:     Labs:    Results from last 7 days   Lab Units 19  0501   WBC Thousand/uL 10 13   HEMOGLOBIN g/dL 7 4*   HEMATOCRIT % 24 3*   PLATELETS Thousands/uL 212   NEUTROS PCT % 86*   LYMPHS PCT % 4*   MONOS PCT % 8   EOS PCT % 1     Results from last 7 days   Lab Units 19  0509  19  0548   POTASSIUM mmol/L 4 9   < > 4 2   CHLORIDE mmol/L 92*   < > 95*   CO2 mmol/L 22   < > 24   BUN mg/dL 101*   < > 73* CREATININE mg/dL 7 74*   < > 5 29*   CALCIUM mg/dL 7 6*   < > 8 0*   ALK PHOS U/L  --   --  71   ALT U/L  --   --  10*   AST U/L  --   --  23    < > = values in this interval not displayed  Results from last 7 days   Lab Units 02/14/19  0754   INR  3 41*       * I Have Reviewed All Lab Data Listed Above  * Additional Pertinent Lab Tests Reviewed:  [facility] Admission Reviewed    Imaging:    Imaging Reports Reviewed Today Include:   Imaging Personally Reviewed by Myself Includes:     Recent Cultures (last 7 days):           Last 24 Hours Medication List:     Current Facility-Administered Medications:  acetaminophen 650 mg Oral Q6H PRN Tarik Ruffin MD    amiodarone 200 mg Oral Daily With Breakfast Katy Leiva MD    aspirin 81 mg Oral Daily Tiny Redo, DO    atorvastatin 40 mg Oral Daily With [employer], DO    bisacodyl 10 mg Rectal Daily PRN Juvencio Teran PA-C    calcium acetate 1,334 mg Oral TID With Meals Jeffrey Quintanilla,     cefazolin 2,000 mg Intravenous After Dialysis Mary Raygoza MD Last Rate: 2,000 mg (02/14/19 1616)   cefazolin 3,000 mg Intravenous After Dialysis Mary Raygoza MD    guaiFENesin 600 mg Oral Q12H [facility] Olive Umanzor PA-C    HYDROmorphone 1 mg Intravenous Q3H PRN Lidya Martinez    ipratropium 0 5 mg Nebulization Q6H PRN Leigh Matos MD    levalbuterol 1 25 mg Nebulization Q6H PRN Leigh Matos MD    metoprolol 5 mg Intravenous Q6H PRN Savannah Oro,     metoprolol succinate 75 mg Oral Q12H Raman Sierra MD    nystatin  Topical BID Juvencio Teran PA-C    oxyCODONE 10 mg Oral Q4H PRN Tarik Ruffin MD    polyethylene glycol 17 g Oral Daily KATHY Mathias    polyvinyl alcohol 1 drop Both Eyes Q3H PRN [employer] ClubsHERBERT    QUEtiapine 25 mg Oral HS [employer], HERBERT    senna-docusate sodium 1 tablet Oral BID Tiny Redo, DO    torsemide 80 mg Oral Daily Morenita Ranks, DO Today, Patient Was Seen By: Ariana Hinton DO    ** Please Note: This note has been constructed using a voice recognition system   **

## 2019-02-15 NOTE — PROGRESS NOTES
Progress Note - Infectious Disease   Georges Recio 60 y o  male MRN: [id number]  Unit/Bed#: [facility] 507-01 Encounter: [id number]      Impression/Plan:  1  Severe sepsis/septic shock   Fever, tachycardia, leukocytosis, hypotension   Also with component of cardiogenic shock  Likely precipitated by MSSA bacteremia   No other clear evidence of acute infection identified   Fevers, procalcitonin improved   Leukocytosis normalized      -antibiotic plan as below   -monitor temperatures and hemodynamics  -ongoing supportive care as per primary     2  MSSA bacteremia   Possibility of endocarditis considered in the setting of bioprosthetic AVR   CHON with no evidence of valvular vegetations   Initial portal of entry may have been related to multiple upper back wounds   CT chest/abdomen/pelvis with no other evidence of acute infection   Possible from pneumonia as sputum culture was positive for MSSA   Bacteremia eventually cleared   Podiatry evaluated patient's feet and no acute issues   Neurology evaluation noted with patient's recent changes in mental status, imaging abnormalities felt to be ischemic and similar compared to prior studies   No plan for MRI      -Ancef dosed with dialysis MWF (2g/2g/3g)  -neurology evaluation appreciated  -ongoing follow-up by Cardiology  -patient will ultimately require prolonged course of IV antibiotics of at least 6 weeks through 3/10  -patient will not require PICC line at discharge his antibiotics can be dosed with dialysis  -will plan to obtain surveillance cultures 1 week after completing antibiotics  -patient will require weekly labs including CBC with differential while on antibiotics  -he will need follow-up in the ID office 2 weeks after discharge  -unclear discharge plans at this time   -would repeat CBC with dialysis today  -office staff has been updated of the above      3  History of bioprosthetic AVR   Secondary to degenerative AS   Placed in July 2014   Joanne Garcia no evidence of endocarditis   Ongoing follow-up by Cardiology      4  Acute kidney injury   Likely ischemic/septic ATN    Patient remains on hemodialysis   He is status post PermCath placement      -ongoing follow-up by Nephrology  -antibiotics currently dosed with dialysis     5  Yash Oropeza had to have low level leukocytosis though he remains otherwise hemodynamically stable   White blood cell count normalized on labs   Patient remains otherwise hemodynamically stable and neurology evaluation noted above      -continue to monitor fever curve/vitals  -repeat CBC with dialysis today  -continue patient on Ancef as above  -if patient spikes fever please send repeat cultures     ID consult service will formally re-evaluate the patient again on Monday  Please call over the weekend if any additional questions or concerns  Antibiotics:  Ancef dosed with HD    24 hr events:  No acute events noted overnight on chart review  Patient is currently afebrile  No new micro data  No new images overnight  Patient noted to have episode of tachycardia and hypoxia  No new labs this morning    Subjective:  Patient seems awake and interactive today  He is with his family who is at bedside  Reviewed his culture results with his family along with course of therapy  Patient again reports inability to bend his knees move his arms and legs to generalized weakness  Objective:  Vitals:  Temp:  [97 2 °F (36 2 °C)-99 1 °F (37 3 °C)] 99 1 °F (37 3 °C)  HR:  [] 131  Resp:  [18-22] 18  BP: (104-134)/(59-92) 130/92  SpO2:  [86 %-99 %] 86 %  Temp (24hrs), Av 3 °F (36 8 °C), Min:97 2 °F (36 2 °C), Max:99 1 °F (37 3 °C)  Current: Temperature: 99 1 °F (37 3 °C)    Physical Exam:   General Appearance:  Alert, interactive, nontoxic, no acute distress  Throat: Oropharynx moist without lesions      Lungs:   Decreased breath sounds throughout all lung fields; no wheezes, rhonchi or rales; respirations unlabored   Heart:  Irregular rhythm; no murmur, rub or gallop though distant heart sound   Abdomen:   Soft, non-tender, non-distended, positive bowel sounds  Extremities: No clubbing, cyanosis; ongoing edema in upper and lower extremities   Skin: No new rashes or lesions  No new draining wounds noted  Central line has been removed  Labs, Imaging, & Other studies:   All pertinent labs and imaging studies were personally reviewed  Results from last 7 days   Lab Units 02/12/19  0501 02/11/19  0525 02/10/19  0456   WBC Thousand/uL 10 13 11 42* 11 02*   HEMOGLOBIN g/dL 7 4* 7 7* 7 5*   PLATELETS Thousands/uL 212 215 234     Results from last 7 days   Lab Units 02/13/19  0509  02/09/19  0548   POTASSIUM mmol/L 4 9   < > 4 2   CHLORIDE mmol/L 92*   < > 95*   CO2 mmol/L 22   < > 24   BUN mg/dL 101*   < > 73*   CREATININE mg/dL 7 74*   < > 5 29*   EGFR ml/min/1 73sq m 7   < > 11   CALCIUM mg/dL 7 6*   < > 8 0*   AST U/L  --   --  23   ALT U/L  --   --  10*   ALK PHOS U/L  --   --  71    < > = values in this interval not displayed

## 2019-02-15 NOTE — HEMODIALYSIS
Patient completed scheduled hemodialysis with no issues  Access function within normal limit  Removed 3328 ml  Patient tolerated tx and ultrafiltration with no issues

## 2019-02-15 NOTE — PROGRESS NOTES
Cardiology Progress Note - Santy Wasserman 60 y o  male MRN: [id number]    Unit/Bed#: [facility] 507-01 Encounter: [id number]      Assessment:  1  Heart failure with reduced EF, LVEF 35-40%, LVIDd 6 5 cm, BiV failure  · LHC - 7/2014 - normal coronaries  · Echo 1/2014 - LVEF 30%, severe diffuse hypokinesis, moderate concentric LVH, mildly dilated RV and moderate-severely reduced RV systolic function,         2  New-onset atrial fibrillation / Atrial flutter  3  Sepsis, possibly from skin ulcer / pneumonia  · Shock - resolved  · Blood culture initially 6/6+ for staph aureus, last blood cx 1/27/19 - 2/2 negative  · Sputum Cx - Staph aureus  4  MSSA bacteremia  5  Bioprosthetic AVR #25 (7/2014) with moderate bioprosthetic AS  6  NSTEMI type 2  · Troponins peaked at >40, ECG - old LBBB  7  Acute renal failure/ATN  · new dialysis initiation this admission, currently dependent  8  Anemia, Hb 7 4  9  Morbid Obesity, Body mass index is 51 7 kg/m²       Outpatient cardiologist: Reyes Simas    Plan  Heart failure with reduced EF, LVEF 35-40%  · EF drop from 50 to 35% in the setting of septic shock + atrial fibrillation/flutter  · Was on milrinone for a few days, but now remains off it for over 2 weeks  · Hemodynamically stable  · Still very volume overloaded  · Remains anuric, and completely dialysis dependent  · Got a trial dose of torsemide 80 (2/12/19), but remained anuric --> likely will be long-term dialysis dependent  · Continue metoprolol 75 bid, amiodarone 200  · For dialysis today    Atrial fibrillation/flutter, new onset  · Occurred in setting of sepsis  · On amiodarone 200, metoprolol succinate 75 bid  · In Afib/flutter on telemetry, with short periods of rapid rates in 130s  · Check ECG  · On coumadin for [facility]  · Will benefit from cardioversion once a bit more euvolemic  · INR supratherapeutic since 2/9 - coumadin dose held  · Dosing per primary team        Subjective:   Appears very weak, restricted to bed    Review of Systems  No c/o chest pain, No c/o palpitations, No c/o shortness of breath, No c/o dizziness or light-headedness, No c/o abdominal pain, no c/o nausea/vomitting  All other systems negative    Telemetry Review: No significant arrhythmias seen on telemetry review    Objective:   Vitals: Blood pressure 136/64, pulse 102, temperature 98 9 °F (37 2 °C), temperature source Axillary, resp  rate 19, height 5' 9" (1 753 m), weight (!) 159 kg (350 lb 1 5 oz), SpO2 91 % , Body mass index is 51 7 kg/m² ,   Orthostatic Blood Pressures      Most Recent Value   Blood Pressure  136/64 filed at 02/15/2019 1050   Patient Position - Orthostatic VS  Lying filed at 02/15/2019 3860         Systolic (19KSI), OLX:660 , Min:108 , SKN:693     Diastolic (89KOX), FKW:94, Min:59, Max:92    Wt Readings from Last 5 Encounters:   02/15/19 (!) 159 kg (350 lb 1 5 oz)   01/24/19 (!) 154 kg (339 lb 8 1 oz)   06/11/18 (!) 155 kg (341 lb)   04/11/18 (!) 155 kg (341 lb)   12/20/17 (!) 148 kg (325 lb 8 oz)     I/O       02/10 0701 - 02/11 0700 02/11 0701 - 02/12 0700 02/12 0701 - 02/13 0700    P  O  440 820 0    I V  (mL/kg)  300 (1 9)     IV Piggyback 50      Total Intake(mL/kg) 490 (3 5) 1120 (7) 0 (0)    Urine (mL/kg/hr) 0 (0) 0 (0)     Other  3300     Total Output 0 3300     Net +490 -2180 0                     Physical Exam   Constitutional: He is oriented to person, place, and time  He appears well-developed and well-nourished  No distress  Neck: Neck supple  JVD present  Cardiovascular: Normal rate, regular rhythm and normal heart sounds  Exam reveals no gallop and no friction rub  No murmur heard  Pulmonary/Chest: Effort normal and breath sounds normal  No respiratory distress  He has no wheezes  He has no rales  He exhibits no tenderness  Abdominal: Soft  He exhibits no distension  There is no tenderness  Musculoskeletal: He exhibits edema  He exhibits no tenderness  Neurological: He is alert and oriented to person, place, and time  Skin: Skin is warm  Psychiatric: He has a normal mood and affect  Vitals reviewed  Laboratory Results:        CBC with diff:   Results from last 7 days   Lab Units 02/12/19  0501 02/11/19  0525 02/10/19  0456 02/09/19  0548   WBC Thousand/uL 10 13 11 42* 11 02* 11 60*   HEMOGLOBIN g/dL 7 4* 7 7* 7 5* 7 5*   HEMATOCRIT % 24 3* 24 7* 24 7* 25 0*   MCV fL 86 87 88 87   PLATELETS Thousands/uL 212 215 234 234   MCH pg 26 2* 27 2 26 7* 26 1*   MCHC g/dL 30 5* 31 2* 30 4* 30 0*   RDW % 17 7* 17 6* 18 1* 17 9*   MPV fL 11 0 11 6 12 0 11 9   NRBC AUTO /100 WBCs 0 0 0 0         CMP:  Results from last 7 days   Lab Units 02/13/19  0509 02/12/19  0501 02/11/19  0525 02/09/19  1113 02/09/19  0548   POTASSIUM mmol/L 4 9 4 6 5 1 4 2 4 2   CHLORIDE mmol/L 92* 94* 95* 95* 95*   CO2 mmol/L 22 24 22 25 24   BUN mg/dL 101* 86* 118* 82* 73*   CREATININE mg/dL 7 74* 6 64* 7 87* 5 66* 5 29*   CALCIUM mg/dL 7 6* 7 7* 7 9* 8 1* 8 0*   AST U/L  --   --   --   --  23   ALT U/L  --   --   --   --  10*   ALK PHOS U/L  --   --   --   --  71   EGFR ml/min/1 73sq m 7 8 7 10 11         BMP:  Results from last 7 days   Lab Units 02/13/19  0509 02/12/19  0501 02/11/19  0525 02/09/19  1113 02/09/19  0548   POTASSIUM mmol/L 4 9 4 6 5 1 4 2 4 2   CHLORIDE mmol/L 92* 94* 95* 95* 95*   CO2 mmol/L 22 24 22 25 24   BUN mg/dL 101* 86* 118* 82* 73*   CREATININE mg/dL 7 74* 6 64* 7 87* 5 66* 5 29*   CALCIUM mg/dL 7 6* 7 7* 7 9* 8 1* 8 0*       BNP: No results for input(s): BNP in the last 72 hours      Magnesium:   Results from last 7 days   Lab Units 02/12/19  0501 02/11/19  0525 02/10/19  0456 02/09/19  0548   MAGNESIUM mg/dL 2 7* 2 8* 2 8* 2 6       Coags:   Results from last 7 days   Lab Units 02/14/19  0754 02/14/19  6738 02/13/19  0509 02/12/19  0501 02/11/19  0525 02/10/19  0456 02/09/19  0548 02/08/19  2248 02/08/19  1432   PTT seconds  --   --   --   --   --   --   --  79* 81*   INR  3 41* 3 47* 4 06* 3 93* 5 71* 3 61* 1 68*  --   -- TSH:        Hemoglobin A1C   Results from last 7 days   Lab Units 02/13/19  0509   HEMOGLOBIN A1C % 6 2       Lipid Profile:   Results from last 7 days   Lab Units 02/13/19  0509   TRIGLYCERIDES mg/dL 129   HDL mg/dL 44       Meds/Allergies   all current active meds have been reviewed and current meds:   Current Facility-Administered Medications   Medication Dose Route Frequency    acetaminophen (TYLENOL) tablet 650 mg  650 mg Oral Q6H PRN    amiodarone tablet 200 mg  200 mg Oral Daily With Breakfast    aspirin (ECOTRIN LOW STRENGTH) EC tablet 81 mg  81 mg Oral Daily    atorvastatin (LIPITOR) tablet 40 mg  40 mg Oral Daily With Dinner    bisacodyl (DULCOLAX) rectal suppository 10 mg  10 mg Rectal Daily PRN    calcium acetate (PHOSLO) capsule 1,334 mg  1,334 mg Oral TID With Meals    [START ON 2/18/2019] ceFAZolin (ANCEF) 2,000 mg in sodium chloride 0 9 % 50 mL IVPB  2,000 mg Intravenous Once per day on Mon Wed    And    ceFAZolin (ANCEF) 3,000 mg in dextrose 5 % 100 mL IVPB  3,000 mg Intravenous Once per day on Fri    guaiFENesin (MUCINEX) 12 hr tablet 600 mg  600 mg Oral Q12H ARACELIS    HYDROmorphone (DILAUDID) injection 1 mg  1 mg Intravenous Q3H PRN    ipratropium (ATROVENT) 0 02 % inhalation solution 0 5 mg  0 5 mg Nebulization Q6H PRN    levalbuterol (XOPENEX) inhalation solution 1 25 mg  1 25 mg Nebulization Q6H PRN    metoprolol (LOPRESSOR) injection 5 mg  5 mg Intravenous Q6H PRN    metoprolol succinate (TOPROL-XL) 24 hr tablet 75 mg  75 mg Oral Q12H    nystatin (MYCOSTATIN) powder   Topical BID    oxyCODONE (ROXICODONE) immediate release tablet 10 mg  10 mg Oral Q4H PRN    polyethylene glycol (MIRALAX) packet 17 g  17 g Oral Daily    polyvinyl alcohol (LIQUIFILM TEARS) 1 4 % ophthalmic solution 1 drop  1 drop Both Eyes Q3H PRN    QUEtiapine (SEROquel) tablet 25 mg  25 mg Oral HS    senna-docusate sodium (SENOKOT S) 8 6-50 mg per tablet 1 tablet  1 tablet Oral BID    torsemide [facility]) tablet 80 mg  80 mg Oral Daily     Medications Prior to Admission   Medication    amLODIPine (NORVASC) 5 mg tablet    ascorbic acid (VITAMIN C) 500 MG tablet    aspirin (ECOTRIN) 325 mg EC tablet    fluticasone (FLONASE) 50 mcg/act nasal spray    loratadine (CLARITIN) 10 mg tablet    metoprolol tartrate (LOPRESSOR) 100 mg tablet    potassium chloride (K-DUR,KLOR-CON) 20 mEq tablet    pravastatin (PRAVACHOL) 40 mg tablet    torsemide (DEMADEX) 20 mg tablet            Cardiac testing:   Results for orders placed during the hospital encounter of 19   Echo complete with contrast if indicated    Narrative [address]  [address], [address]  [phone]    Transthoracic Echocardiogram  2D, Doppler, and Color Doppler    Study date:  2019    Patient: Alfreda Rodriguez  MR number: [id number]  Account number: [de-identified]  : 1960  Age: 60 years  Gender: Male  Status: Inpatient  Location: Bedside  Height: 72 in  Weight: 339 lb  BP: 89/ 54 mmHg    Indications: chest pain    Diagnoses: R07 9 - Chest pain, unspecified    Sonographer:  Yonathan Noble RDCS, CCT  Primary Physician:  Bairon Christianson DO  Referring Physician:  Anabel Navarro DO  Group:  [facility]  Interpreting Physician:  Elba Madrid DO    SUMMARY    PROCEDURE INFORMATION:  This was a technically difficult study despite the administration of echo contrast     LEFT VENTRICLE:  Systolic function was markedly reduced  Ejection fraction was estimated to be 30 %  There was severe diffuse hypokinesis with no obvious identifiable regional variations  Wall thickness was moderately increased  Concentric hypertrophy was present  VENTRICULAR SEPTUM:  There was dyssynergic motion  RIGHT VENTRICLE:  The ventricle was mildly dilated  Systolic function was moderately to markedly reduced  LEFT ATRIUM:  The atrium was mildly dilated      RIGHT ATRIUM:  The atrium was mildly dilated  AORTIC VALVE:  A bioprosthesis was present  It was not well visualized  Mean transvalvular gradient 13 mmHg  TRICUSPID VALVE:  There was mild regurgitation  PERICARDIUM:  A small, partially loculated pericardial effusion was identified along the left ventricular free wall  HISTORY: PRIOR HISTORY: Aortic valve prosthesis    PROCEDURE: The procedure was performed at the bedside  This was a routine study  The transthoracic approach was used  The study included complete 2D imaging, complete spectral Doppler, and color Doppler  The heart rate was 80 bpm, at the  start of the study  Intravenous contrast (Definity solution [1 3 ml Definity/8 7ml normal saline solution]) was administered  Intravenous contrast (Definity solution [1 3 ml Definity/8 7ml normal saline solution]) was administered to  opacify the left ventricle and enhance Doppler signals  Echocardiographic views were limited due to low windows and patient on mechanical ventilator  This was a technically difficult study despite the administration of echo contrast     LEFT VENTRICLE: Size was normal  Systolic function was markedly reduced  Ejection fraction was estimated to be 30 %  There was severe diffuse hypokinesis with no obvious identifiable regional variations  Wall thickness was moderately  increased  Concentric hypertrophy was present  DOPPLER: Normal sinus rhythm was absent  The study was not technically sufficient to allow evaluation of LV diastolic function  VENTRICULAR SEPTUM: There was dyssynergic motion  RIGHT VENTRICLE: The ventricle was mildly dilated  Systolic function was moderately to markedly reduced  LEFT ATRIUM: The atrium was mildly dilated  RIGHT ATRIUM: The atrium was mildly dilated  MITRAL VALVE: Not well visualized  DOPPLER: The transmitral velocity was within the normal range  There was no evidence for stenosis  There was no significant regurgitation  AORTIC VALVE: A bioprosthesis was present   It was not well visualized  Mean transvalvular gradient 13 mmHg  DOPPLER: There was no significant regurgitation  TRICUSPID VALVE: The valve structure was normal  There was normal leaflet separation  DOPPLER: The transtricuspid velocity was within the normal range  There was no evidence for stenosis  There was mild regurgitation  The tricuspid jet  envelope definition was inadequate for estimation of RV systolic pressure  PULMONIC VALVE: Leaflets exhibited normal thickness, no calcification, and normal cuspal separation  DOPPLER: The transpulmonic velocity was within the normal range  There was no significant regurgitation  PERICARDIUM: A small, partially loculated pericardial effusion was identified along the left ventricular free wall  The pericardium was normal in appearance  AORTA: The root exhibited normal size  SYSTEM MEASUREMENT TABLES    2D  %FS: 15 83 %  Ao Diam: 3 09 cm  EDV(Teich): 221 88 ml  EF(Teich): 32 54 %  ESV(Teich): 149 69 ml  IVSd: 1 59 cm  LA Diam: 5 18 cm  LVIDd: 6 58 cm  LVIDs: 5 54 cm  LVPWd: 1 43 cm  SV(Teich): 72 19 ml    CW  AV Env  Ti: 233 56 ms  AV VTI: 49 77 cm  AV Vmax: 2 64 m/s  AV Vmax: 2 67 m/s  AV Vmean: 2 13 m/s  AV maxP 95 mmHg  AV maxP 53 mmHg  AV meanP 73 mmHg    PW  LVOT Env  Ti: 215 22 ms  LVOT VTI: 11 41 cm  LVOT Vmax: 0 59 m/s  LVOT Vmax: 0 7 m/s  LVOT Vmean: 0 52 m/s  LVOT maxP 78 mmHg  LVOT maxP mmHg  LVOT meanP 24 mmHg  MV E Deep: 1 01 m/s    [employer] Accredited Echocardiography Laboratory    Prepared and electronically signed by    Efren Walton DO  Signed 2019 10:14:55       Results for orders placed during the hospital encounter of 19   CHON    Narrative [address]  [facility], [address] [place] [address]  [phone]    Transesophageal Echocardiogram  2D, Doppler, and Color Doppler    Study date:  2019    Patient: Liliana Suarez  MR number: [id number]  Account number: [id number]  : 1960  Age: 60 years  Gender: Male  Status: Inpatient  Location: Bedside  Height: 69 in  Weight: 319 lb  BP: 101/ 54 mmHg    Indications: Bacteremia  Diagnoses: R78 81 - Bacteremia    Sonographer:  Pankaj Orlando RDCS  Interpreting Physician:  Denise Velasco DO  Primary Physician:  Allyn Hackett DO  Referring Physician:  Kody Raines DO  Group:  [facility]  Cardiology Fellow:  Zayda Garzon MD    IMPRESSIONS:  There was no echocardiographic evidence for valvular vegetation  SUMMARY    LEFT VENTRICLE:  Systolic function was moderately reduced  Ejection fraction was estimated to be 40 %  There was moderate diffuse hypokinesis  Wall thickness was mildly increased  There was mild concentric hypertrophy  RIGHT VENTRICLE:  The size was normal   Systolic function was mildly reduced  LEFT ATRIUM:  The atrium was mildly dilated  ATRIAL SEPTUM:  There was a small patent foramen ovale with left to right shunt identified by color Doppler  RIGHT ATRIUM:  The atrium was mildly dilated  MITRAL VALVE:  There was trace regurgitation  There was no echocardiographic evidence of vegetation  AORTIC VALVE:  A bioprosthesis was present  It exhibited normal function  There was no echocardiographic evidence of vegetation  TRICUSPID VALVE:  There was mild regurgitation  There was no echocardiographic evidence of vegetation  HISTORY: PRIOR HISTORY: Aortic valve replacement, NSTEMI, Cardiomyopathy, Acute kidney injury  PROCEDURE: The procedure was performed at the bedside  This was a routine study  The risks and alternatives of the procedure were explained to the patient's next of kin and informed consent was obtained  The transesophageal approach was  used  The study included complete 2D imaging, complete spectral Doppler, and color Doppler  The heart rate was 113 bpm, at the start of the study   An adult omniplane probe was inserted by the cardiology fellow under direct supervision of  the attending cardiologist  Intubated with ease  One intubation attempt(s)  There was no blood detected on the probe  Image quality was adequate  There were no complications during the procedure  MEDICATIONS: Sedation administered by  bedside nurse  LEFT VENTRICLE: Size was normal  Systolic function was moderately reduced  Ejection fraction was estimated to be 40 %  There was moderate diffuse hypokinesis  Wall thickness was mildly increased  There was mild concentric hypertrophy  DOPPLER: The study was not technically sufficient to allow evaluation of LV diastolic function  RIGHT VENTRICLE: The size was normal  Systolic function was mildly reduced  Wall thickness was normal     LEFT ATRIUM: The atrium was mildly dilated  No thrombus was identified  APPENDAGE: The appendage was small  No thrombus was identified  DOPPLER: The function was normal (normal emptying velocity)  ATRIAL SEPTUM: There was a small patent foramen ovale with left to right shunt identified by color Doppler  RIGHT ATRIUM: The atrium was mildly dilated  No thrombus was identified  MITRAL VALVE: Valve structure was normal  There was normal leaflet separation  There was no echocardiographic evidence of vegetation  DOPPLER: There was trace regurgitation  AORTIC VALVE: A bioprosthesis was present  It exhibited normal function  There was no echocardiographic evidence of vegetation  TRICUSPID VALVE: The valve structure was normal  There was normal leaflet separation  There was no echocardiographic evidence of vegetation  DOPPLER: There was mild regurgitation  PULMONIC VALVE: Leaflets exhibited normal thickness, no calcification, and normal cuspal separation  There was no echocardiographic evidence of vegetation  DOPPLER: There was no significant regurgitation  PERICARDIUM: There was no pericardial effusion seen in the images obtained  AORTA: The root exhibited normal size  There was no atheroma  There was no evidence for dissection  There was no evidence for aneurysm  [address] [facility]    Prepared and electronically signed by    Haylee Lewis DO  Signed 25-Jan-2019 14:01:14       No results found for this or any previous visit  No results found for this or any previous visit  Counseling / Coordination of Care  Total floor / unit time spent today 20 minutes  Greater than 50% of total time was spent with the patient and / or family counseling and / or coordination of care  A description of the counseling / coordination of care: Obtained history, performed physical examination, discussed laboratory and imaging results, and explained further plan of care  Tr Kebede

## 2019-02-16 PROBLEM — D69.6 THROMBOCYTOPENIA (HCC): Status: RESOLVED | Noted: 2019-01-26 | Resolved: 2019-02-16

## 2019-02-16 PROBLEM — R21 SKIN RASH: Status: ACTIVE | Noted: 2019-02-16

## 2019-02-16 PROBLEM — I50.21 ACUTE SYSTOLIC CHF (CONGESTIVE HEART FAILURE) (HCC): Status: ACTIVE | Noted: 2019-02-16

## 2019-02-16 PROBLEM — G92.8 TOXIC METABOLIC ENCEPHALOPATHY: Status: ACTIVE | Noted: 2019-02-16

## 2019-02-16 LAB
INR PPP: 3.73 (ref 0.86–1.17)
PROTHROMBIN TIME: 36.9 SECONDS (ref 11.8–14.2)

## 2019-02-16 PROCEDURE — 85610 PROTHROMBIN TIME: CPT | Performed by: INTERNAL MEDICINE

## 2019-02-16 PROCEDURE — 99232 SBSQ HOSP IP/OBS MODERATE 35: CPT | Performed by: INTERNAL MEDICINE

## 2019-02-16 PROCEDURE — 94760 N-INVAS EAR/PLS OXIMETRY 1: CPT

## 2019-02-16 PROCEDURE — 94660 CPAP INITIATION&MGMT: CPT

## 2019-02-16 RX ORDER — DIAPER,BRIEF,INFANT-TODD,DISP
EACH MISCELLANEOUS 2 TIMES DAILY
Status: DISCONTINUED | OUTPATIENT
Start: 2019-02-16 | End: 2019-03-12

## 2019-02-16 RX ADMIN — DIPHENHYDRAMINE HCL 25 MG: 25 TABLET ORAL at 08:54

## 2019-02-16 RX ADMIN — GUAIFENESIN 600 MG: 600 TABLET, EXTENDED RELEASE ORAL at 22:48

## 2019-02-16 RX ADMIN — QUETIAPINE FUMARATE 25 MG: 25 TABLET ORAL at 22:48

## 2019-02-16 RX ADMIN — CALCIUM ACETATE 1334 MG: 667 CAPSULE ORAL at 08:54

## 2019-02-16 RX ADMIN — CALCIUM ACETATE 1334 MG: 667 CAPSULE ORAL at 18:22

## 2019-02-16 RX ADMIN — HYDROCORTISONE: 1 CREAM TOPICAL at 18:23

## 2019-02-16 RX ADMIN — CALCIUM ACETATE 1334 MG: 667 CAPSULE ORAL at 13:06

## 2019-02-16 RX ADMIN — OXYCODONE HYDROCHLORIDE 10 MG: 10 TABLET ORAL at 18:22

## 2019-02-16 RX ADMIN — GUAIFENESIN 600 MG: 600 TABLET, EXTENDED RELEASE ORAL at 08:54

## 2019-02-16 RX ADMIN — METOPROLOL SUCCINATE 75 MG: 50 TABLET, EXTENDED RELEASE ORAL at 08:55

## 2019-02-16 RX ADMIN — AMIODARONE HYDROCHLORIDE 200 MG: 200 TABLET ORAL at 08:54

## 2019-02-16 RX ADMIN — HYDROCORTISONE: 1 CREAM TOPICAL at 13:05

## 2019-02-16 RX ADMIN — TORSEMIDE 80 MG: 20 TABLET ORAL at 08:54

## 2019-02-16 RX ADMIN — SENNOSIDES AND DOCUSATE SODIUM 1 TABLET: 8.6; 5 TABLET ORAL at 08:54

## 2019-02-16 RX ADMIN — ATORVASTATIN CALCIUM 40 MG: 40 TABLET, FILM COATED ORAL at 18:22

## 2019-02-16 RX ADMIN — ASPIRIN 81 MG: 81 TABLET, COATED ORAL at 08:54

## 2019-02-16 RX ADMIN — NYSTATIN: 100000 POWDER TOPICAL at 08:55

## 2019-02-16 RX ADMIN — NYSTATIN: 100000 POWDER TOPICAL at 18:24

## 2019-02-16 RX ADMIN — METOPROLOL SUCCINATE 75 MG: 50 TABLET, EXTENDED RELEASE ORAL at 22:48

## 2019-02-16 RX ADMIN — DIPHENHYDRAMINE HCL 25 MG: 25 TABLET ORAL at 22:48

## 2019-02-16 RX ADMIN — SENNOSIDES AND DOCUSATE SODIUM 1 TABLET: 8.6; 5 TABLET ORAL at 18:22

## 2019-02-16 NOTE — PROGRESS NOTES
NEPHROLOGY PROGRESS NOTE   Simi Osorio 60 y o  male MRN: [id number]  Unit/Bed#: [facility] 507-01 Encounter: [id number]  Reason for Consult: PATRICK    ASSESSMENT/PLAN:  1  Acute kidney injury, likely secondary to ATN, patient remains oliguric, no signs of recovery, continue with hemodialysis Monday Wednesday Friday  2  MSSA bacteremia to complete 6 weeks of antibiotic treatment  3  History of bioprosthetic valve, aortic, no evidence of vegetation  4  Atrial fibrillation  5  Encephalopathy with waxing and waning features  6  Anemia of chronic disease  7  Cardiomyopathy with an ejection fraction of 30%  8  Mineral bone disorder, continue with PhosLo      PLAN:  · Continue maintenance hemodialysis, Monday Wednesday Friday  · Challenge dry weight on hemodialysis  · Start IV iron, saturation of 18%  · No signs of renal recovery, remains hemodialysis dependent    SUBJECTIVE:  Seen examined  Patient appears more awake alert today  Conversive  Does not appear short of breath  No active chest pain  Denies any abdominal pain  Review of Systems    OBJECTIVE:  Current Weight: Weight - Scale: (!) 165 kg (363 lb 5 1 oz)  Vitals:    02/16/19 0500 02/16/19 0711 02/16/19 0828 02/16/19 1100   BP:  135/69  142/76   BP Location:       Pulse: 73 75  77   Resp:  18  18   Temp:  98 5 °F (36 9 °C)  98 5 °F (36 9 °C)   TempSrc:       SpO2:  90% 94% 94%   Weight:       Height:           Intake/Output Summary (Last 24 hours) at 2/16/2019 1131  Last data filed at 2/16/2019 0828  Gross per 24 hour   Intake 1200 ml   Output 3328 ml   Net -2128 ml       Physical Exam   Constitutional: No distress  HENT:   Head: Normocephalic  Neck: Neck supple  Cardiovascular: Normal rate  An irregular rhythm present  Pulmonary/Chest: Effort normal and breath sounds normal    Abdominal: Soft  He exhibits distension  Musculoskeletal: He exhibits edema (2+ edema)  Neurological: He is alert  Skin: Skin is warm and dry         Medications:    Current Facility-Administered Medications:     acetaminophen (TYLENOL) tablet 650 mg, 650 mg, Oral, Q6H PRN, Michael Kendrick MD, 650 mg at 02/13/19 1732    amiodarone tablet 200 mg, 200 mg, Oral, Daily With Breakfast, Charles Rodriguez MD, 200 mg at 02/16/19 0854    aspirin (ECOTRIN LOW STRENGTH) EC tablet 81 mg, 81 mg, Oral, Daily, Ralph Alicea DO, 81 mg at 02/16/19 0854    atorvastatin (LIPITOR) tablet 40 mg, 40 mg, Oral, Daily With Shoozy, DO, 40 mg at 02/15/19 1813    bisacodyl (DULCOLAX) rectal suppository 10 mg, 10 mg, Rectal, Daily PRN, Alexandra Kenney PA-C    calcium acetate (PHOSLO) capsule 1,334 mg, 1,334 mg, Oral, TID With Meals, Daniella Quintanilla DO, 1,334 mg at 02/16/19 0854    [START ON 2/18/2019] ceFAZolin (ANCEF) 2,000 mg in sodium chloride 0 9 % 50 mL IVPB, 2,000 mg, Intravenous, Once per day on Mon Wed **AND** ceFAZolin (ANCEF) 3,000 mg in dextrose 5 % 100 mL IVPB, 3,000 mg, Intravenous, Once per day on Fri, Carmen Cardona MD, Stopped at 02/15/19 1734    diphenhydrAMINE (BENADRYL) tablet 25 mg, 25 mg, Oral, Q6H PRN, Ralph Alicea DO, 25 mg at 02/16/19 0854    guaiFENesin (MUCINEX) 12 hr tablet 600 mg, 600 mg, Oral, Q12H [facility], Olive Umanzor PA-C, 600 mg at 02/16/19 0854    hydrocortisone 1 % cream, , Topical, BID, Ralph Alicea DO    HYDROmorphone (DILAUDID) injection 1 mg, 1 mg, Intravenous, Q3H PRN, Lidya Martinez, 1 mg at 02/11/19 1600    ipratropium (ATROVENT) 0 02 % inhalation solution 0 5 mg, 0 5 mg, Nebulization, Q6H PRN, Jam Johnson MD    levalbuterol Doraine Edjazmin) inhalation solution 1 25 mg, 1 25 mg, Nebulization, Q6H PRN, Jam Johnson MD    metoprolol (LOPRESSOR) injection 5 mg, 5 mg, Intravenous, Q6H PRN, Savannah Oro DO, 5 mg at 02/11/19 1559    metoprolol succinate (TOPROL-XL) 24 hr tablet 75 mg, 75 mg, Oral, Q12H, Raman De Dios MD, 75 mg at 02/16/19 0855    nystatin (MYCOSTATIN) powder, , Topical, BID, Alexandra Kenney, HERBERT    oxyCODONE (ROXICODONE) immediate release tablet 10 mg, 10 mg, Oral, Q4H PRN, Lorrie Duncan MD, 10 mg at 02/15/19 0801    polyethylene glycol (MIRALAX) packet 17 g, 17 g, Oral, Daily, KATHY Mathias, 17 g at 02/15/19 3423    polyvinyl alcohol (LIQUIFILM TEARS) 1 4 % ophthalmic solution 1 drop, 1 drop, Both Eyes, Q3H PRN, Lucía Soto PA-C, 1 drop at 02/13/19 1724    QUEtiapine (SEROquel) tablet 25 mg, 25 mg, Oral, HS, Lucía Soto PA-C, 25 mg at 02/15/19 2138    senna-docusate sodium (SENOKOT S) 8 6-50 mg per tablet 1 tablet, 1 tablet, Oral, BID, Javad Su DO, 1 tablet at 02/16/19 0854    torsemide (DEMADEX) tablet 80 mg, 80 mg, Oral, Daily, Andreina Quintanilla DO, 80 mg at 02/16/19 9398    Laboratory Results:  Results from last 7 days   Lab Units 02/15/19  1358 02/13/19  0509 02/12/19  0501 02/11/19  0525 02/10/19  0456   WBC Thousand/uL  --   --  10 13 11 42* 11 02*   HEMOGLOBIN g/dL  --   --  7 4* 7 7* 7 5*   HEMATOCRIT %  --   --  24 3* 24 7* 24 7*   PLATELETS Thousands/uL  --   --  212 215 234   POTASSIUM mmol/L 4 2 4 9 4 6 5 1  --    CHLORIDE mmol/L 93* 92* 94* 95*  --    CO2 mmol/L 25 22 24 22  --    BUN mg/dL 93* 101* 86* 118*  --    CREATININE mg/dL 7 14* 7 74* 6 64* 7 87*  --    CALCIUM mg/dL 8 2* 7 6* 7 7* 7 9*  --    MAGNESIUM mg/dL  --   --  2 7* 2 8* 2 8*   PHOSPHORUS mg/dL  --   --  8 9* 10 6* 8 9*

## 2019-02-16 NOTE — PROGRESS NOTES
During shift assessment, the patient's HD cath found to be completely removed with catheter intact and no signs of bleeding  [facility]  Dempsey Fabry, RN, from P8 came to bedside to assess HD cath site  Pressure was applied to the area, and a clean gauze and tegaderm was applied  Area is clean, dry, and intact

## 2019-02-16 NOTE — PLAN OF CARE
Problem: Potential for Falls  Goal: Patient will remain free of falls  Description  INTERVENTIONS:  - Assess patient frequently for physical needs  -  Identify cognitive and physical deficits and behaviors that affect risk of falls  -  [place] fall precautions as indicated by assessment   - Educate patient/family on patient safety including physical limitations  - Instruct patient to call for assistance with activity based on assessment  - Modify environment to reduce risk of injury  - Consider OT/PT consult to assist with strengthening/mobility    2/16/2019 0540 by Danyel Lizarraga RN  Outcome: Progressing  2/16/2019 0540 by Danyel Lizarraga RN  Outcome: Progressing     Problem: Prexisting or High Potential for Compromised Skin Integrity  Goal: Skin integrity is maintained or improved  Description  INTERVENTIONS:  - Identify patients at risk for skin breakdown  - Assess and monitor skin integrity  - Assess and monitor nutrition and hydration status  - Monitor labs (i e  albumin)  - Assess for incontinence   - Turn and reposition patient  - Assist with mobility/ambulation  - Relieve pressure over bony prominences  - Avoid friction and shearing  - Provide appropriate hygiene as needed including keeping skin clean and dry  - Evaluate need for skin moisturizer/barrier cream  - Collaborate with interdisciplinary team (i e  Nutrition, Rehabilitation, etc )   - Patient/family teaching   2/16/2019 0540 by Danyel Lizarraga RN  Outcome: Progressing  2/16/2019 0540 by Danyel Lizarraga RN  Outcome: Progressing     Problem: Nutrition/Hydration-ADULT  Goal: Nutrient/Hydration intake appropriate for improving, restoring or maintaining nutritional needs  Description  Monitor and assess patient's nutrition/hydration status for malnutrition (ex- brittle hair, bruises, dry skin, pale skin and conjunctiva, muscle wasting, smooth red tongue, and disorientation)   Collaborate with interdisciplinary team and initiate plan and interventions as ordered  Monitor patient's weight and dietary intake as ordered or per policy  Utilize nutrition screening tool and intervene per policy  Determine patient's food preferences and provide high-protein, high-caloric foods as appropriate  INTERVENTIONS:  - Monitor oral intake, urinary output, labs, and treatment plans  - Assess nutrition and hydration status and recommend course of action  - Evaluate amount of meals eaten  - Assist patient with eating if necessary   - Allow adequate time for meals  - Recommend/ encourage appropriate diets, oral nutritional supplements, and vitamin/mineral supplements  - Order, calculate, and assess calorie counts as needed  - Recommend, monitor, and adjust tube feedings and TPN/PPN based on assessed needs  - Assess need for intravenous fluids  - Provide specific nutrition/hydration education as appropriate  - Include patient/family/caregiver in decisions related to nutrition   2/16/2019 0540 by Osvaldo Brito RN  Outcome: Progressing  2/16/2019 0540 by Osvaldo Brito RN  Outcome: Progressing     Problem: CARDIOVASCULAR - ADULT  Goal: Maintains optimal cardiac output and hemodynamic stability  Description  INTERVENTIONS:  - Monitor I/O, vital signs and rhythm  - Monitor for S/S and trends of decreased cardiac output i e  bleeding, hypotension  - Administer and titrate ordered vasoactive medications to optimize hemodynamic stability  - Assess quality of pulses, skin color and temperature  - Assess for signs of decreased coronary artery perfusion - ex   Angina  - Instruct patient to report change in severity of symptoms   2/16/2019 0540 by Osvaldo Brito RN  Outcome: Progressing  2/16/2019 0540 by Osvaldo Brito RN  Outcome: Progressing  Goal: Absence of cardiac dysrhythmias or at baseline rhythm  Description  INTERVENTIONS:  - Continuous cardiac monitoring, monitor vital signs, obtain 12 lead EKG if indicated  - Administer antiarrhythmic and heart rate control medications as ordered  - Monitor electrolytes and administer replacement therapy as ordered   2/16/2019 0540 by Osvaldo Brito RN  Outcome: Progressing  2/16/2019 0540 by Osvaldo Brito RN  Outcome: Progressing     Problem: METABOLIC, FLUID AND ELECTROLYTES - ADULT  Goal: Electrolytes maintained within normal limits  Description  INTERVENTIONS:  - Monitor labs and assess patient for signs and symptoms of electrolyte imbalances  - Administer electrolyte replacement as ordered  - Monitor response to electrolyte replacements, including repeat lab results as appropriate  - Instruct patient on fluid and nutrition as appropriate   2/16/2019 0540 by Osvaldo Brito RN  Outcome: Progressing  2/16/2019 0540 by Osvaldo Brito RN  Outcome: Progressing  Goal: Fluid balance maintained  Description  INTERVENTIONS:  - Monitor labs and assess for signs and symptoms of volume excess or deficit  - Monitor I/O and WT  - Instruct patient on fluid and nutrition as appropriate   2/16/2019 0540 by Osvaldo Brito RN  Outcome: Progressing  2/16/2019 0540 by Osvaldo Brito RN  Outcome: Progressing     Problem: PAIN - ADULT  Goal: Verbalizes/displays adequate comfort level or baseline comfort level  Description  Interventions:  - Encourage patient to monitor pain and request assistance  - Assess pain using appropriate pain scale  - Administer analgesics based on type and severity of pain and evaluate response  - Implement non-pharmacological measures as appropriate and evaluate response  - Consider cultural and social influences on pain and pain management  - Notify physician/advanced practitioner if interventions unsuccessful or patient reports new pain   2/16/2019 0540 by Osvaldo Brito RN  Outcome: Progressing  2/16/2019 0540 by Osvaldo Brito RN  Outcome: Progressing     Problem: INFECTION - ADULT  Goal: Absence or prevention of progression during hospitalization  Description  INTERVENTIONS:  - Assess and monitor for signs and symptoms of infection  - Monitor lab/diagnostic results  - Monitor all insertion sites, i e  indwelling lines, tubes, and drains  - Monitor endotracheal (as able) and nasal secretions for changes in amount and color  - [place] appropriate cooling/warming therapies per order  - Administer medications as ordered  - Instruct and encourage patient and family to use good hand hygiene technique  - Identify and instruct in appropriate isolation precautions for identified infection/condition    2/16/2019 0540 by Radu Hurtado RN  Outcome: Progressing  2/16/2019 0540 by Radu Hurtado RN  Outcome: Progressing     Problem: SAFETY ADULT  Goal: Patient will remain free of falls  Description  INTERVENTIONS:  - Assess patient frequently for physical needs  -  Identify cognitive and physical deficits and behaviors that affect risk of falls    -  [place] fall precautions as indicated by assessment   - Educate patient/family on patient safety including physical limitations  - Instruct patient to call for assistance with activity based on assessment  - Modify environment to reduce risk of injury  - Consider OT/PT consult to assist with strengthening/mobility    2/16/2019 0540 by Radu Hurtado RN  Outcome: Progressing  2/16/2019 0540 by Radu Hurtado RN  Outcome: Progressing  Goal: Maintain or return to baseline ADL function  Description  INTERVENTIONS:  -  Assess patient's ability to carry out ADLs; assess patient's baseline for ADL function and identify physical deficits which impact ability to perform ADLs (bathing, care of mouth/teeth, toileting, grooming, dressing, etc )  - Assess/evaluate cause of self-care deficits   - Assess range of motion  - Assess patient's mobility; develop plan if impaired  - Assess patient's need for assistive devices and provide as appropriate  - Encourage maximum independence but intervene and supervise when necessary  ¯ Involve family in performance of ADLs  ¯ Assess for home care needs following discharge   ¯ Request OT consult to assist with ADL evaluation and planning for discharge  ¯ Provide patient education as appropriate    2/16/2019 0540 by Stacia Hammond RN  Outcome: Progressing  2/16/2019 0540 by Stacia Hammond RN  Outcome: Progressing  Goal: Maintain or return mobility status to optimal level  Description  INTERVENTIONS:  - Assess patient's baseline mobility status (ambulation, transfers, stairs, etc )    - Identify cognitive and physical deficits and behaviors that affect mobility  - Identify mobility aids required to assist with transfers and/or ambulation (gait belt, sit-to-stand, lift, walker, cane, etc )  - [place] fall precautions as indicated by assessment  - Record patient progress and toleration of activity level on Mobility SBAR; progress patient to next Phase/Stage  - Instruct patient to call for assistance with activity based on assessment  - Request Rehabilitation consult to assist with strengthening/weightbearing, etc     2/16/2019 0540 by Stacia Hammond RN  Outcome: Progressing  2/16/2019 0540 by Stacia Hammond RN  Outcome: Progressing     Problem: DISCHARGE PLANNING  Goal: Discharge to home or other facility with appropriate resources  Description  INTERVENTIONS:  - Identify barriers to discharge w/patient and caregiver  - Arrange for needed discharge resources and transportation as appropriate  - Identify discharge learning needs (meds, wound care, etc )  - Arrange for interpretive services to assist at discharge as needed  - Refer to Case Management Department for coordinating discharge planning if the patient needs post-hospital services based on physician/advanced practitioner order or complex needs related to functional status, cognitive ability, or social support system   2/16/2019 0540 by Stacia Hammond RN  Outcome: Progressing  2/16/2019 0540 by Stacia Hammond RN  Outcome: Progressing     Problem: Knowledge Deficit  Goal: Patient/family/caregiver demonstrates understanding of disease process, treatment plan, medications, and discharge instructions  Description  Complete learning assessment and assess knowledge base    Interventions:  - Provide teaching at level of understanding  - Provide teaching via preferred learning methods   2/16/2019 0540 by Osvaldo Brito RN  Outcome: Progressing  2/16/2019 0540 by Osvaldo Brito RN  Outcome: Progressing     Problem: GENITOURINARY - ADULT  Goal: Maintains or returns to baseline urinary function  Description  INTERVENTIONS:  - Assess urinary function  - Encourage oral fluids to ensure adequate hydration  - Administer IV fluids as ordered to ensure adequate hydration  - Administer ordered medications as needed  - Offer frequent toileting  - Follow urinary retention protocol if ordered   2/16/2019 0540 by Osvaldo Brito RN  Outcome: Progressing  2/16/2019 0540 by Osvaldo Brito RN  Outcome: Progressing  Goal: Absence of urinary retention  Description  INTERVENTIONS:  - Assess patient?s ability to void and empty bladder  - Monitor I/O  - Bladder scan as needed  - Discuss with physician/AP medications to alleviate retention as needed  - Discuss catheterization for long term situations as appropriate   2/16/2019 0540 by Osvaldo Brito RN  Outcome: Progressing  2/16/2019 0540 by Osvaldo Brito RN  Outcome: Progressing     Problem: DISCHARGE PLANNING - CARE MANAGEMENT  Goal: Discharge to post-acute care or home with appropriate resources  Description  INTERVENTIONS:  - Conduct assessment to determine patient/family and health care team treatment goals, and need for post-acute services based on payer coverage, community resources, and patient preferences, and barriers to discharge  - Address psychosocial, clinical, and financial barriers to discharge as identified in assessment in conjunction with the patient/family and health care team  - Arrange appropriate level of post-acute services according to patient's   needs and preference and payer coverage in collaboration with the physician and health care team  - Communicate with and update the patient/family, physician, and health care team regarding progress on the discharge plan  - Arrange appropriate transportation to post-acute venues  - Pt to d/c with appropriate resources when medically stable     2/16/2019 0540 by Christina Lowry RN  Outcome: Progressing  2/16/2019 0540 by Christina Lowry RN  Outcome: Progressing

## 2019-02-16 NOTE — PROGRESS NOTES
[address] Internal Medicine Progress Note  Patient: Yue Martínez 60 y o  male   MRN: [id number]  PCP: Bairon Christianson DO  Unit/Bed#: [facility] 507-01 Encounter: [id number]  Date Of Visit: 02/16/19    Assessment:    Principal Problem:    Staphylococcus aureus bacteremia  Active Problems:    Increased BMI    Stress-induced cardiomyopathy    Acute respiratory failure with hypoxia ([facility] [address] [facility] )    History of aortic valve replacement with bioprosthetic valve    Atrial fibrillation (HCC)    Transaminitis    Thrombocytopenia (HCC)    Anemia    Leukocytosis    Cerebrovascular accident ([facility] [address] )    PATRICK (acute kidney injury) ([facility] )    Hypervolemia      Plan:    1  MSSA bacteremia, negative CHON, negative repeat blood culture, per ID, plan for 6 weeks of antibiotic through 3/10  2  Recent shock, likely multifactorial,  cardiogenic/septic, resolved  3  PATRICK secondary to ATN, no sign of renal recovery,  now HD dependent on MWF, nephrology is following, need a new IV access, patient pulled HD catheter out  4  Acute systolic CHF/cardiomyopathy with EF 30%, Still with volume overload,  cardiology is following, on Lopressor and Demadex, volume status managed by HD  5  S/p bioprosthetic AVR, negative CHON for vegetation  6  Acute hypoxic respiratory failure secondary to above, resolved,  s/p extubation, continue supportive care  7  New onset AFib, with fluctuating heart rate, continue to have supratherapeutic INR, coumadin still on hold, cardiology is following, on amiodarone  8  Non MI- elevated troponin  9  Toxic metabolic encephalopathy with abnormal head CT scan, evaluated by Neurology,  likely ischemic event bilaterally,  recommendation to repeat head CT scan in 2 weeks, continue aspirin and  Statin, bilateral carotid ultrasound without significant stenosis  10  Anemia of chronic disease, stable,  monitor  11  Morbid obesity  12  Skin rash, likely rule out Xerosis/contact irritation, start hydrocortisone cream, p r n     Benadryl, monitor      VTE Pharmacologic Prophylaxis:   Pharmacologic: Warfarin (Coumadin) elevated INR  Mechanical VTE Prophylaxis in Place: Yes    Patient Centered Rounds: I have performed bedside rounds with nursing staff today  Discussions with Specialists or Other Care Team Provider:     Education and Discussions with Family / Patient:  Patient, unable to reach wife by phone    Time Spent for Care: 30 minutes  More than 50% of total time spent on counseling and coordination of care as described above  Current Length of Stay: 23 day(s)    Current Patient Status: Inpatient   Certification Statement: The patient will continue to require additional inpatient hospital stay due to Management of PATRICK    Discharge Plan / Estimated Discharge Date:  Awaiting rehab placement    Code Status: Level 1 - Full Code      Subjective:   Patient seen and examined  Comfortable in bed  Mild itching skin rash on the left side and back  Patient pulled HD catheter yesterday  No bleeding    Objective:     Vitals:   Temp (24hrs), Av 5 °F (36 9 °C), Min:97 8 °F (36 6 °C), Max:98 9 °F (37 2 °C)    Temp:  [97 8 °F (36 6 °C)-98 9 °F (37 2 °C)] 98 5 °F (36 9 °C)  HR:  [] 75  Resp:  [18-23] 18  BP: (107-144)/(57-80) 135/69  SpO2:  [90 %-94 %] 94 %  Body mass index is 53 65 kg/m²  Input and Output Summary (last 24 hours):        Intake/Output Summary (Last 24 hours) at 2019 9455  Last data filed at 2019 8439  Gross per 24 hour   Intake 1200 ml   Output 3328 ml   Net -2128 ml       Physical Exam:     Physical Exam     Patient is awake in no acute distress  Somewhat disoriented  Left-sided back left flank and anterior abdomen diffuse erythema no vesicles  Lung with decreased breath sounds bilateral  Heart positive S1-S2 irregular,  no murmur  Abdomen soft obese distended nontender  Lower extremities edema    Additional Data:     Labs:    Results from last 7 days   Lab Units 19  0501   WBC Thousand/uL 10 13   HEMOGLOBIN g/dL 7 4* HEMATOCRIT % 24 3*   PLATELETS Thousands/uL 212   NEUTROS PCT % 86*   LYMPHS PCT % 4*   MONOS PCT % 8   EOS PCT % 1     Results from last 7 days   Lab Units 02/15/19  1358   POTASSIUM mmol/L 4 2   CHLORIDE mmol/L 93*   CO2 mmol/L 25   BUN mg/dL 93*   CREATININE mg/dL 7 14*   CALCIUM mg/dL 8 2*   ALK PHOS U/L 72   ALT U/L 7*   AST U/L 21     Results from last 7 days   Lab Units 02/16/19  0443   INR  3 73*       * I Have Reviewed All Lab Data Listed Above  * Additional Pertinent Lab Tests Reviewed:  [address] Admission Reviewed    Imaging:    Imaging Reports Reviewed Today Include:   Imaging Personally Reviewed by Myself Includes:     Recent Cultures (last 7 days):           Last 24 Hours Medication List:     Current Facility-Administered Medications:  acetaminophen 650 mg Oral Q6H PRN Lorena Ryan MD    amiodarone 200 mg Oral Daily With Breakfast Ida Matthews MD    aspirin 81 mg Oral Daily Alejandra Figueroa DO    atorvastatin 40 mg Oral Daily With Envysion, DO    bisacodyl 10 mg Rectal Daily PRN Tatianna Dubon PA-C    calcium acetate 1,334 mg Oral TID With Meals Caroline Payne DO    [START ON 2/18/2019] cefazolin 2,000 mg Intravenous Once per day on Mon Wed Caitlin Gaston MD    And        cefazolin 3,000 mg Intravenous Once per day on Fri Caitlin Gaston MD Last Rate: Stopped (02/15/19 9020)   diphenhydrAMINE 25 mg Oral Q6H PRN Alejandra Figueroa DO    guaiFENesin 600 mg Oral Q12H [facility] Olive Umanzor PA-C    HYDROmorphone 1 mg Intravenous Q3H PRN Lidya Martinez    ipratropium 0 5 mg Nebulization Q6H PRN Mehreen Nguyen MD    levalbuterol 1 25 mg Nebulization Q6H PRN Mehreen Nguyne MD    metoprolol 5 mg Intravenous Q6H PRN Savannah Oro DO    metoprolol succinate 75 mg Oral Q12H Elvia Monzon MD    nystatin  Topical BID Tatianna Dubon PA-C    oxyCODONE 10 mg Oral Q4H PRN Lorena Ryan MD    polyethylene glycol 17 g Oral Daily Stefanie Beard, CRNP    polyvinyl alcohol 1 drop Both Eyes Q3H PRN Charles Ledezma PA-C    QUEtiapine 25 mg Oral HS Charles Ledezma PA-C    senna-docusate sodium 1 tablet Oral BID Chinedu Merritt DO    torsemide 80 mg Oral Daily Sara Rosenthal DO         Today, Patient Was Seen By: Chinedu Merritt DO    ** Please Note: This note has been constructed using a voice recognition system   **

## 2019-02-17 LAB
BASOPHILS # BLD AUTO: 0.02 THOUSANDS/ΜL (ref 0–0.1)
BASOPHILS NFR BLD AUTO: 0 % (ref 0–1)
EOSINOPHIL # BLD AUTO: 0.49 THOUSAND/ΜL (ref 0–0.61)
EOSINOPHIL NFR BLD AUTO: 5 % (ref 0–6)
ERYTHROCYTE [DISTWIDTH] IN BLOOD BY AUTOMATED COUNT: 17.2 % (ref 11.6–15.1)
HCT VFR BLD AUTO: 25.3 % (ref 36.5–49.3)
HGB BLD-MCNC: 7.5 G/DL (ref 12–17)
IMM GRANULOCYTES # BLD AUTO: 0.18 THOUSAND/UL (ref 0–0.2)
IMM GRANULOCYTES NFR BLD AUTO: 2 % (ref 0–2)
INR PPP: 4.27 (ref 0.86–1.17)
LYMPHOCYTES # BLD AUTO: 0.43 THOUSANDS/ΜL (ref 0.6–4.47)
LYMPHOCYTES NFR BLD AUTO: 5 % (ref 14–44)
MCH RBC QN AUTO: 25.8 PG (ref 26.8–34.3)
MCHC RBC AUTO-ENTMCNC: 29.6 G/DL (ref 31.4–37.4)
MCV RBC AUTO: 87 FL (ref 82–98)
MONOCYTES # BLD AUTO: 0.7 THOUSAND/ΜL (ref 0.17–1.22)
MONOCYTES NFR BLD AUTO: 7 % (ref 4–12)
NEUTROPHILS # BLD AUTO: 7.6 THOUSANDS/ΜL (ref 1.85–7.62)
NEUTS SEG NFR BLD AUTO: 81 % (ref 43–75)
NRBC BLD AUTO-RTO: 0 /100 WBCS
PLATELET # BLD AUTO: 239 THOUSANDS/UL (ref 149–390)
PMV BLD AUTO: 11.3 FL (ref 8.9–12.7)
PROTHROMBIN TIME: 41 SECONDS (ref 11.8–14.2)
RBC # BLD AUTO: 2.91 MILLION/UL (ref 3.88–5.62)
WBC # BLD AUTO: 9.42 THOUSAND/UL (ref 4.31–10.16)

## 2019-02-17 PROCEDURE — 85025 COMPLETE CBC W/AUTO DIFF WBC: CPT | Performed by: INTERNAL MEDICINE

## 2019-02-17 PROCEDURE — 94760 N-INVAS EAR/PLS OXIMETRY 1: CPT

## 2019-02-17 PROCEDURE — 85610 PROTHROMBIN TIME: CPT | Performed by: INTERNAL MEDICINE

## 2019-02-17 PROCEDURE — 99232 SBSQ HOSP IP/OBS MODERATE 35: CPT | Performed by: INTERNAL MEDICINE

## 2019-02-17 RX ORDER — PHYTONADIONE 5 MG/1
5 TABLET ORAL ONCE
Status: COMPLETED | OUTPATIENT
Start: 2019-02-17 | End: 2019-02-17

## 2019-02-17 RX ADMIN — HYDROCORTISONE: 1 CREAM TOPICAL at 10:36

## 2019-02-17 RX ADMIN — CALCIUM ACETATE 1334 MG: 667 CAPSULE ORAL at 10:34

## 2019-02-17 RX ADMIN — CALCIUM ACETATE 1334 MG: 667 CAPSULE ORAL at 12:49

## 2019-02-17 RX ADMIN — HYDROCORTISONE: 1 CREAM TOPICAL at 17:58

## 2019-02-17 RX ADMIN — DIPHENHYDRAMINE HCL 25 MG: 25 TABLET ORAL at 10:35

## 2019-02-17 RX ADMIN — AMIODARONE HYDROCHLORIDE 200 MG: 200 TABLET ORAL at 10:35

## 2019-02-17 RX ADMIN — NYSTATIN: 100000 POWDER TOPICAL at 10:36

## 2019-02-17 RX ADMIN — OXYCODONE HYDROCHLORIDE 10 MG: 10 TABLET ORAL at 10:36

## 2019-02-17 RX ADMIN — GUAIFENESIN 600 MG: 600 TABLET, EXTENDED RELEASE ORAL at 10:35

## 2019-02-17 RX ADMIN — CALCIUM ACETATE 1334 MG: 667 CAPSULE ORAL at 15:46

## 2019-02-17 RX ADMIN — POLYETHYLENE GLYCOL 3350 17 G: 17 POWDER, FOR SOLUTION ORAL at 10:36

## 2019-02-17 RX ADMIN — GUAIFENESIN 600 MG: 600 TABLET, EXTENDED RELEASE ORAL at 21:53

## 2019-02-17 RX ADMIN — NYSTATIN: 100000 POWDER TOPICAL at 17:58

## 2019-02-17 RX ADMIN — QUETIAPINE FUMARATE 25 MG: 25 TABLET ORAL at 21:52

## 2019-02-17 RX ADMIN — METOPROLOL SUCCINATE 75 MG: 50 TABLET, EXTENDED RELEASE ORAL at 10:35

## 2019-02-17 RX ADMIN — ATORVASTATIN CALCIUM 40 MG: 40 TABLET, FILM COATED ORAL at 15:46

## 2019-02-17 RX ADMIN — SENNOSIDES AND DOCUSATE SODIUM 1 TABLET: 8.6; 5 TABLET ORAL at 10:35

## 2019-02-17 RX ADMIN — OXYCODONE HYDROCHLORIDE 10 MG: 10 TABLET ORAL at 18:07

## 2019-02-17 RX ADMIN — TORSEMIDE 80 MG: 20 TABLET ORAL at 10:35

## 2019-02-17 RX ADMIN — PHYTONADIONE 5 MG: 5 TABLET ORAL at 17:58

## 2019-02-17 RX ADMIN — ASPIRIN 81 MG: 81 TABLET, COATED ORAL at 10:35

## 2019-02-17 RX ADMIN — METOPROLOL SUCCINATE 75 MG: 50 TABLET, EXTENDED RELEASE ORAL at 21:52

## 2019-02-17 NOTE — PROGRESS NOTES
[address] Internal Medicine Progress Note  Patient: Juan Alberto Ch 60 y o  male   MRN: [id number]  PCP: Michi Child DO  Unit/Bed#: [facility] 507-01 Encounter: [id number]  Date Of Visit: 02/17/19    Assessment:    Principal Problem:    Staphylococcus aureus bacteremia  Active Problems:    Increased BMI    Stress-induced cardiomyopathy    Acute respiratory failure with hypoxia ([facility] [address] )    History of aortic valve replacement with bioprosthetic valve    Atrial fibrillation (HCC)    Transaminitis    Anemia    Leukocytosis    Cerebrovascular accident ([facility] [address] )    [facility]    Hypervolemia    Acute systolic CHF (congestive heart failure) ([facility])    Toxic metabolic encephalopathy    Skin rash      Plan:    1  MSSA bacteremia, negative CHON, negative repeat BC, per ID, plan for 6 weeks of antibiotic through 3/10  2  Recent shock, likely multifactorial,  cardiogenic/septic, resolved  3  PATRICK secondary to ATN, no sign of renal recovery,  now HD dependent on MWF, nephrology is following, need a new IV access, patient pulled HD catheter out, IR consulted  4  Acute systolic CHF/cardiomyopathy with EF 30%, Still with volume overload,  cardiology is following, on Lopressor and Demadex, volume status managed by HD  5  S/p bioprosthetic AVR, negative CHON for vegetation  6  Acute hypoxic respiratory failure secondary to above, resolved,  s/p extubation, continue supportive care  7  New onset AFib, with fluctuating heart rate, continue to have supratherapeutic INR, no bleeding,  coumadin still on hold, cardiology is following, on amiodarone  8  Non MI- elevated troponin  9  Toxic metabolic encephalopathy with abnormal head CT scan, evaluated by Neurology,  likely ischemic event bilaterally,  recommendation to repeat head CT scan in 2 weeks, continue aspirin and  Statin, bilateral carotid ultrasound without significant stenosis  10  Anemia of chronic disease, stable,  monitor  11  Morbid obesity  12   Skin rash,  rule out Xerosis/contact irritation, continue hydrocortisone cream, p r n     Benadryl, monitor  13  Worsening abdominal distension, rule out ascites    Check abdominal ultrasound rule out ascites  Vitamin K x1 in preparation for HD  placement by IR  Check INR in a m  VTE Pharmacologic Prophylaxis:   Pharmacologic: Warfarin (Coumadin) elevated INR  Mechanical VTE Prophylaxis in Place: Yes    Patient Centered Rounds: I have performed bedside rounds with nursing staff today  Discussions with Specialists or Other Care Team Provider:     Education and Discussions with Family / Patient:  Patient    Time Spent for Care: 30 minutes  More than 50% of total time spent on counseling and coordination of care as described above  Current Length of Stay: 24 day(s)    Current Patient Status: Inpatient   Certification Statement: The patient will continue to require additional inpatient hospital stay due to Management of PATRICK    Discharge Plan / Estimated Discharge Date:  Awaiting rehab placement    Code Status: Level 1 - Full Code      Subjective:   Patient seen and examined  Comfortable in bed  Mild abdominal bloating and distention  Patient reported having bowel movements and flatus  Worsening INR today, no bleeding    Objective:     Vitals:   Temp (24hrs), Av 4 °F (36 9 °C), Min:98 3 °F (36 8 °C), Max:98 6 °F (37 °C)    Temp:  [98 3 °F (36 8 °C)-98 6 °F (37 °C)] 98 6 °F (37 °C)  HR:  [] 86  Resp:  [18-20] 18  BP: (107-147)/(56-70) 108/67  SpO2:  [91 %-100 %] 95 %  Body mass index is 54 3 kg/m²  Input and Output Summary (last 24 hours):        Intake/Output Summary (Last 24 hours) at 2019 1335  Last data filed at 2019 3295  Gross per 24 hour   Intake 1407 ml   Output 0 ml   Net 1407 ml       Physical Exam:     Physical Exam     Patient is awake in no acute distress  Somewhat disoriented  Lung with decreased breath sounds bilateral  Heart positive S1-S2 irregular, no murmur  Abdomen soft, more distended, tender to deep palpation, positive bowel sounds  Lower extremities edema    Additional Data:     Labs:    Results from last 7 days   Lab Units 02/17/19  0704   WBC Thousand/uL 9 42   HEMOGLOBIN g/dL 7 5*   HEMATOCRIT % 25 3*   PLATELETS Thousands/uL 239   NEUTROS PCT % 81*   LYMPHS PCT % 5*   MONOS PCT % 7   EOS PCT % 5     Results from last 7 days   Lab Units 02/15/19  1358   POTASSIUM mmol/L 4 2   CHLORIDE mmol/L 93*   CO2 mmol/L 25   BUN mg/dL 93*   CREATININE mg/dL 7 14*   CALCIUM mg/dL 8 2*   ALK PHOS U/L 72   ALT U/L 7*   AST U/L 21     Results from last 7 days   Lab Units 02/17/19  1027   INR  4 27*       * I Have Reviewed All Lab Data Listed Above  * Additional Pertinent Lab Tests Reviewed:  [address] Admission Reviewed    Imaging:    Imaging Reports Reviewed Today Include:   Imaging Personally Reviewed by Myself Includes:     Recent Cultures (last 7 days):           Last 24 Hours Medication List:     Current Facility-Administered Medications:  acetaminophen 650 mg Oral Q6H PRN Nahun Evans MD    amiodarone 200 mg Oral Daily With Breakfast Essie Cr MD    aspirin 81 mg Oral Daily Earl Cabrales DO    atorvastatin 40 mg Oral Daily With [employer], DO    bisacodyl 10 mg Rectal Daily PRN Cassandra Werner PA-C    calcium acetate 1,334 mg Oral TID With Meals Bibiana Link DO    [START ON 2/18/2019] cefazolin 2,000 mg Intravenous Once per day on Mon Wed Annmarie Tavera MD    And        cefazolin 3,000 mg Intravenous Once per day on Fri Annmarie Tavera MD Last Rate: Stopped (02/15/19 2626)   diphenhydrAMINE 25 mg Oral Q6H PRN Earl Cabrales DO    guaiFENesin 600 mg Oral Q12H [address] Rj Martinez PA-C    hydrocortisone  Topical BID Earl Cabrales DO    HYDROmorphone 1 mg Intravenous Q3H PRN Lidya Martinez    ipratropium 0 5 mg Nebulization Q6H PRN Aixa Latham MD    [START ON 2/18/2019] iron sucrose 200 mg Intravenous Once per day on Mon Wed Fri Ernie Smith DO [place]    levalbuterol 1 25 mg Nebulization Q6H PRN Bienvenido Gregory MD    metoprolol 5 mg Intravenous Q6H PRN Savannah Oro DO    metoprolol succinate 75 mg Oral Q12H Farhan Trinh MD    nystatin  Topical BID Winsome Melchor PA-C    oxyCODONE 10 mg Oral Q4H PRN Brook Howard MD    phytonadione 5 mg Oral Once Izabela Wild DO    polyethylene glycol 17 g Oral Daily KATHY Mathias    polyvinyl alcohol 1 drop Both Eyes Q3H PRN Anina Durán PA-C    QUEtiapine 25 mg Oral HS Annia Durán PA-C    senna-docusate sodium 1 tablet Oral BID Izabela Wild DO    torsemide 80 mg Oral Daily Neptali Cedeno DO         Today, Patient Was Seen By: Izabela Wild DO    ** Please Note: This note has been constructed using a voice recognition system   **

## 2019-02-18 ENCOUNTER — APPOINTMENT (INPATIENT)
Dept: RADIOLOGY | Facility: HOSPITAL | Age: 59
DRG: 871 | End: 2019-02-18
Attending: INTERNAL MEDICINE
Payer: COMMERCIAL

## 2019-02-18 ENCOUNTER — APPOINTMENT (INPATIENT)
Dept: DIALYSIS | Facility: HOSPITAL | Age: 59
DRG: 871 | End: 2019-02-18
Payer: COMMERCIAL

## 2019-02-18 LAB
ABO GROUP BLD: NORMAL
ANISOCYTOSIS BLD QL SMEAR: PRESENT
BASOPHILS # BLD MANUAL: 0.11 THOUSAND/UL (ref 0–0.1)
BASOPHILS NFR MAR MANUAL: 1 % (ref 0–1)
BLD GP AB SCN SERPL QL: NEGATIVE
BURR CELLS BLD QL SMEAR: PRESENT
EOSINOPHIL # BLD MANUAL: 0.42 THOUSAND/UL (ref 0–0.4)
EOSINOPHIL NFR BLD MANUAL: 4 % (ref 0–6)
ERYTHROCYTE [DISTWIDTH] IN BLOOD BY AUTOMATED COUNT: 17.3 % (ref 11.6–15.1)
HCT VFR BLD AUTO: 23.1 % (ref 36.5–49.3)
HGB BLD-MCNC: 7 G/DL (ref 12–17)
INR PPP: 1.73 (ref 0.86–1.17)
LYMPHOCYTES # BLD AUTO: 0.11 THOUSAND/UL (ref 0.6–4.47)
LYMPHOCYTES # BLD AUTO: 1 % (ref 14–44)
MCH RBC QN AUTO: 26.3 PG (ref 26.8–34.3)
MCHC RBC AUTO-ENTMCNC: 29.9 G/DL (ref 31.4–37.4)
MCV RBC AUTO: 88 FL (ref 82–98)
MONOCYTES # BLD AUTO: 0.32 THOUSAND/UL (ref 0–1.22)
MONOCYTES NFR BLD: 3 % (ref 4–12)
NEUTROPHILS # BLD MANUAL: 9.59 THOUSAND/UL (ref 1.85–7.62)
NEUTS SEG NFR BLD AUTO: 91 % (ref 43–75)
NRBC BLD AUTO-RTO: 0 /100 WBCS
PLATELET # BLD AUTO: 238 THOUSANDS/UL (ref 149–390)
PLATELET BLD QL SMEAR: ADEQUATE
PMV BLD AUTO: 10.8 FL (ref 8.9–12.7)
POIKILOCYTOSIS BLD QL SMEAR: PRESENT
POLYCHROMASIA BLD QL SMEAR: PRESENT
PROTHROMBIN TIME: 20.3 SECONDS (ref 11.8–14.2)
RBC # BLD AUTO: 2.62 MILLION/UL (ref 3.88–5.62)
RBC MORPH BLD: PRESENT
RH BLD: POSITIVE
SPECIMEN EXPIRATION DATE: NORMAL
WBC # BLD AUTO: 10.54 THOUSAND/UL (ref 4.31–10.16)

## 2019-02-18 PROCEDURE — 99233 SBSQ HOSP IP/OBS HIGH 50: CPT | Performed by: INTERNAL MEDICINE

## 2019-02-18 PROCEDURE — 86901 BLOOD TYPING SEROLOGIC RH(D): CPT | Performed by: INTERNAL MEDICINE

## 2019-02-18 PROCEDURE — 99232 SBSQ HOSP IP/OBS MODERATE 35: CPT | Performed by: INTERNAL MEDICINE

## 2019-02-18 PROCEDURE — C1750 CATH, HEMODIALYSIS,LONG-TERM: HCPCS

## 2019-02-18 PROCEDURE — P9016 RBC LEUKOCYTES REDUCED: HCPCS

## 2019-02-18 PROCEDURE — 86900 BLOOD TYPING SEROLOGIC ABO: CPT | Performed by: INTERNAL MEDICINE

## 2019-02-18 PROCEDURE — 77001 FLUOROGUIDE FOR VEIN DEVICE: CPT

## 2019-02-18 PROCEDURE — 85007 BL SMEAR W/DIFF WBC COUNT: CPT | Performed by: INTERNAL MEDICINE

## 2019-02-18 PROCEDURE — C1894 INTRO/SHEATH, NON-LASER: HCPCS

## 2019-02-18 PROCEDURE — 86923 COMPATIBILITY TEST ELECTRIC: CPT

## 2019-02-18 PROCEDURE — 86850 RBC ANTIBODY SCREEN: CPT | Performed by: INTERNAL MEDICINE

## 2019-02-18 PROCEDURE — 36558 INSERT TUNNELED CV CATH: CPT | Performed by: RADIOLOGY

## 2019-02-18 PROCEDURE — 99152 MOD SED SAME PHYS/QHP 5/>YRS: CPT | Performed by: RADIOLOGY

## 2019-02-18 PROCEDURE — 76937 US GUIDE VASCULAR ACCESS: CPT

## 2019-02-18 PROCEDURE — 76937 US GUIDE VASCULAR ACCESS: CPT | Performed by: RADIOLOGY

## 2019-02-18 PROCEDURE — 85610 PROTHROMBIN TIME: CPT | Performed by: INTERNAL MEDICINE

## 2019-02-18 PROCEDURE — 77001 FLUOROGUIDE FOR VEIN DEVICE: CPT | Performed by: RADIOLOGY

## 2019-02-18 PROCEDURE — 94660 CPAP INITIATION&MGMT: CPT

## 2019-02-18 PROCEDURE — 02HV33Z INSERTION OF INFUSION DEVICE INTO SUPERIOR VENA CAVA, PERCUTANEOUS APPROACH: ICD-10-PCS | Performed by: RADIOLOGY

## 2019-02-18 PROCEDURE — 85027 COMPLETE CBC AUTOMATED: CPT | Performed by: INTERNAL MEDICINE

## 2019-02-18 PROCEDURE — 36558 INSERT TUNNELED CV CATH: CPT

## 2019-02-18 PROCEDURE — 99152 MOD SED SAME PHYS/QHP 5/>YRS: CPT

## 2019-02-18 PROCEDURE — 94760 N-INVAS EAR/PLS OXIMETRY 1: CPT

## 2019-02-18 RX ORDER — METOPROLOL SUCCINATE 50 MG/1
100 TABLET, EXTENDED RELEASE ORAL EVERY 12 HOURS
Status: DISCONTINUED | OUTPATIENT
Start: 2019-02-18 | End: 2019-02-21

## 2019-02-18 RX ORDER — FENTANYL CITRATE 50 UG/ML
INJECTION, SOLUTION INTRAMUSCULAR; INTRAVENOUS CODE/TRAUMA/SEDATION MEDICATION
Status: COMPLETED | OUTPATIENT
Start: 2019-02-18 | End: 2019-02-18

## 2019-02-18 RX ORDER — MIDAZOLAM HYDROCHLORIDE 1 MG/ML
INJECTION INTRAMUSCULAR; INTRAVENOUS CODE/TRAUMA/SEDATION MEDICATION
Status: COMPLETED | OUTPATIENT
Start: 2019-02-18 | End: 2019-02-18

## 2019-02-18 RX ORDER — WARFARIN SODIUM 1 MG/1
2 TABLET ORAL
Status: DISCONTINUED | OUTPATIENT
Start: 2019-02-18 | End: 2019-02-21

## 2019-02-18 RX ORDER — VANCOMYCIN HYDROCHLORIDE 1 G/200ML
10 INJECTION, SOLUTION INTRAVENOUS
Status: DISCONTINUED | OUTPATIENT
Start: 2019-02-18 | End: 2019-02-22

## 2019-02-18 RX ADMIN — CALCIUM ACETATE 1334 MG: 667 CAPSULE ORAL at 15:46

## 2019-02-18 RX ADMIN — ASPIRIN 81 MG: 81 TABLET, COATED ORAL at 15:47

## 2019-02-18 RX ADMIN — AMIODARONE HYDROCHLORIDE 200 MG: 200 TABLET ORAL at 11:51

## 2019-02-18 RX ADMIN — METOPROLOL SUCCINATE 100 MG: 100 TABLET, EXTENDED RELEASE ORAL at 23:09

## 2019-02-18 RX ADMIN — FENTANYL CITRATE 25 MCG: 50 INJECTION, SOLUTION INTRAMUSCULAR; INTRAVENOUS at 10:53

## 2019-02-18 RX ADMIN — ATORVASTATIN CALCIUM 40 MG: 40 TABLET, FILM COATED ORAL at 15:47

## 2019-02-18 RX ADMIN — DIPHENHYDRAMINE HCL 25 MG: 25 TABLET ORAL at 15:46

## 2019-02-18 RX ADMIN — TORSEMIDE 80 MG: 20 TABLET ORAL at 15:46

## 2019-02-18 RX ADMIN — MIDAZOLAM 0.5 MG: 1 INJECTION INTRAMUSCULAR; INTRAVENOUS at 10:52

## 2019-02-18 RX ADMIN — CEFAZOLIN SODIUM 2000 MG: 10 INJECTION, POWDER, FOR SOLUTION INTRAVENOUS at 13:18

## 2019-02-18 RX ADMIN — GUAIFENESIN 600 MG: 600 TABLET, EXTENDED RELEASE ORAL at 23:09

## 2019-02-18 RX ADMIN — HYDROCORTISONE: 1 CREAM TOPICAL at 15:46

## 2019-02-18 RX ADMIN — IRON SUCROSE 200 MG: 20 INJECTION, SOLUTION INTRAVENOUS at 12:31

## 2019-02-18 RX ADMIN — MIDAZOLAM 0.5 MG: 1 INJECTION INTRAMUSCULAR; INTRAVENOUS at 11:03

## 2019-02-18 RX ADMIN — WARFARIN SODIUM 2 MG: 2 TABLET ORAL at 18:31

## 2019-02-18 RX ADMIN — OXYCODONE HYDROCHLORIDE 10 MG: 10 TABLET ORAL at 10:06

## 2019-02-18 RX ADMIN — QUETIAPINE FUMARATE 25 MG: 25 TABLET ORAL at 23:09

## 2019-02-18 RX ADMIN — NYSTATIN 1 APPLICATION: 100000 POWDER TOPICAL at 18:31

## 2019-02-18 RX ADMIN — VANCOMYCIN HYDROCHLORIDE 2000 MG: 10 INJECTION, POWDER, LYOPHILIZED, FOR SOLUTION INTRAVENOUS at 23:09

## 2019-02-18 NOTE — UTILIZATION REVIEW
Continued Stay Review    Date: 2-18-19    60year old male underwent tunneled hemodialysis catheter placement on 2/4/2019 which was completely dislodged        ASSESSMEMT AND PLAN     Worsening abdominal distension, rule out ascites  Check abdominal ultrasound rule out ascites  Vitamin K x1 in preparation for HD  placement by IR  Check INR   Return to IR today for  New tunneled hemodialysis catheter placement   MSSA bacteremia, negative CHON, negative repeat BC, per ID, plan for 6 weeks of antibiotic through 5/51  Toxic metabolic encephalopathy with abnormal head CT scan, evaluated by Neurology,  likely ischemic event bilaterally,  recommendation to repeat head CT scan in 2 weeks, continue aspirin and  Statin, bilateral carotid ultrasound without significant stenosis        Vital Signs: BP (!) 103/26   Pulse 70   Temp 98 6 °F (37 °C) (Axillary)   Resp 20   Ht 5' 9" (1 753 m)   Wt (!) 162 kg (357 lb 9 4 oz)   SpO2 96%   BMI 52 81 kg/m²          Medications:     acetaminophen 650 mg Oral Q6H PRN   amiodarone 200 mg Oral Daily With Breakfast   aspirin 81 mg Oral Daily   atorvastatin 40 mg Oral Daily With Dinner   bisacodyl 10 mg Rectal Daily PRN   calcium acetate 1,334 mg Oral TID With Meals   cefazolin 2,000 mg Intravenous Once per day on Mon Wed   And      cefazolin 3,000 mg Intravenous Once per day on Fri   diphenhydrAMINE 25 mg Oral Q6H PRN   guaiFENesin 600 mg Oral Q12H ARACELIS   hydrocortisone  Topical BID   HYDROmorphone 1 mg Intravenous Q3H PRN   iron sucrose 200 mg Intravenous Once per day on Mon Wed Fri   metoprolol 5 mg Intravenous Q6H PRN   metoprolol succinate 75 mg Oral Q12H   nystatin  Topical BID   oxyCODONE 10 mg Oral Q4H PRN   polyethylene glycol 17 g Oral Daily   polyvinyl alcohol 1 drop Both Eyes Q3H PRN   QUEtiapine 25 mg Oral HS   senna-docusate sodium 1 tablet Oral BID   torsemide 80 mg Oral Daily   warfarin 2 mg Oral Daily (warfarin)     Discharge Plan  TO BE DETERMINED

## 2019-02-18 NOTE — PROGRESS NOTES
[address] Internal Medicine Progress Note  Patient: Greta Nolan 60 y o  male   MRN: [id number]  PCP: Jacques Fragoso DO  Unit/Bed#: [facility] 507-01 Encounter: [id number]  Date Of Visit: 02/18/19    Assessment:    Principal Problem:    Staphylococcus aureus bacteremia  Active Problems:    Increased BMI    Stress-induced cardiomyopathy    Acute respiratory failure with hypoxia ([facility] [address] )    History of aortic valve replacement with bioprosthetic valve    Atrial fibrillation (HCC)    Transaminitis    Anemia    Leukocytosis    [facility] )    PATRICK [facility]) ([facility] )    Hypervolemia    Acute systolic CHF (congestive heart failure) ([facility])    Toxic metabolic encephalopathy    Skin rash      Plan:    1  MSSA bacteremia, negative CHON, negative repeat BC, per ID, plan for 6 weeks of antibiotic through 3/10  2  Recent shock, likely multifactorial,  cardiogenic/septic, resolved  3  PATRICK secondary to ATN, no sign of renal recovery,  now HD dependent on MWF, nephrology is following,  patient pulled HD catheter out, status post catheter placement today by IR   4  Acute systolic CHF/cardiomyopathy with EF 30%, Still with volume overload,  cardiology is following, on Lopressor and Demadex, volume status managed by HD  5  S/p bioprosthetic AVR, negative CHON for vegetation  6  Acute hypoxic respiratory failure secondary to above, resolved,  s/p extubation, continue supportive care  7  New onset AFib/ A flutter, with fluctuating heart rate, Toprol XL  was increased today, restart small dose of Coumadin, continue amiodarone, monitor INR, follow on cardiac echo  8  Non MI- elevated troponin  9  Toxic metabolic encephalopathy with abnormal head CT scan, evaluated by Neurology,  likely ischemic event bilaterally,  recommendation to repeat head CT scan in 2 weeks, continue aspirin and  Statin, bilateral carotid ultrasound without significant stenosis  10   Anemia of chronic disease, with drop in hemoglobin, transfuse 1 unit of blood today   monitor  11  Morbid obesity  12  Skin rash,  rule out Xerosis/contact irritation, continue hydrocortisone cream, p r n     Benadryl, monitor, ID is following  13  Worsening abdominal distension, rule out ascites, follow on abdominal ultrasound    Per , in order to accommodate rehab at the facility, need to change HD to TTS  Discussed with Nephrology, plan for half session today and half session tomorrow    VTE Pharmacologic Prophylaxis:   Pharmacologic: Warfarin (Coumadin)   Mechanical VTE Prophylaxis in Place: Yes    Patient Centered Rounds: I have performed bedside rounds with nursing staff today  Discussions with Specialists or Other Care Team Provider: ID, Cardiology    Education and Discussions with Family / Patient:  Patient, discussed with patient's wife    Time Spent for Care: 30 minutes  More than 50% of total time spent on counseling and coordination of care as described above  Current Length of Stay: 25 day(s)    Current Patient Status: Inpatient   Certification Statement: The patient will continue to require additional inpatient hospital stay due to Management of PATRICK    Discharge Plan / Estimated Discharge Date:  Awaiting rehab placement    Code Status: Level 1 - Full Code      Subjective:   Patient seen and examined  Comfortable in bed  Still with skin itching  Hemoglobin down to 7  INR is better      Objective:     Vitals:   Temp (24hrs), Av 3 °F (36 8 °C), Min:97 6 °F (36 4 °C), Max:98 7 °F (37 1 °C)    Temp:  [97 6 °F (36 4 °C)-98 7 °F (37 1 °C)] 98 7 °F (37 1 °C)  HR:  [] 112  Resp:  [18-20] 18  BP: ()/(26-95) 133/73  SpO2:  [90 %-99 %] 97 %  Body mass index is 52 81 kg/m²  Input and Output Summary (last 24 hours):        Intake/Output Summary (Last 24 hours) at 2019 1538  Last data filed at 2019 1400  Gross per 24 hour   Intake 620 ml   Output 2500 ml   Net -1880 ml       Physical Exam:     Physical Exam     Patient is awake in no acute distress  Somewhat disoriented  Lung with decreased breath sounds bilateral  Heart positive S1-S2 irregular, no murmur  Abdomen soft, more distended, tender to deep palpation, positive bowel sounds  Abdominal skin erythema extending to left groin and thigh  Lower extremities edema    Additional Data:     Labs:    Results from last 7 days   Lab Units 02/18/19  0909 02/17/19  0704   WBC Thousand/uL 10 54* 9 42   HEMOGLOBIN g/dL 7 0* 7 5*   HEMATOCRIT % 23 1* 25 3*   PLATELETS Thousands/uL 238 239   NEUTROS PCT %  --  81*   LYMPHS PCT %  --  5*   LYMPHO PCT % 1*  --    MONOS PCT %  --  7   MONO PCT % 3*  --    EOS PCT % 4 5     Results from last 7 days   Lab Units 02/15/19  1358   POTASSIUM mmol/L 4 2   CHLORIDE mmol/L 93*   CO2 mmol/L 25   BUN mg/dL 93*   CREATININE mg/dL 7 14*   CALCIUM mg/dL 8 2*   ALK PHOS U/L 72   ALT U/L 7*   AST U/L 21     Results from last 7 days   Lab Units 02/18/19  0909   INR  1 73*       * I Have Reviewed All Lab Data Listed Above  * Additional Pertinent Lab Tests Reviewed:  [address] Admission Reviewed    Imaging:    Imaging Reports Reviewed Today Include:   Imaging Personally Reviewed by Myself Includes:     Recent Cultures (last 7 days):           Last 24 Hours Medication List:     Current Facility-Administered Medications:  acetaminophen 650 mg Oral Q6H PRN Allan Mayers MD    amiodarone 200 mg Oral Daily With Breakfast Rick Blount MD    aspirin 81 mg Oral Daily Jennifer Button, DO    atorvastatin 40 mg Oral Daily With Aura XM, DO    bisacodyl 10 mg Rectal Daily PRN Janeth Sánchez PA-C    calcium acetate 1,334 mg Oral TID With Meals Gabriel Quintanilla, DO    cefazolin 2,000 mg Intravenous Once per day on Mon Wed Sebastián Mathis MD Last Rate: 2,000 mg (02/18/19 1318)   And        cefazolin 3,000 mg Intravenous Once per day on Fri Sebastián Mathis MD Last Rate: Stopped (02/15/19 8634)   diphenhydrAMINE 25 mg Oral Q6H PRN Jennifer Button, DO guaiFENesin 600 mg Oral Q12H [address] Olive Umanzor PA-C    hydrocortisone  Topical BID Janette Harmon DO    HYDROmorphone 1 mg Intravenous Q3H PRN Lauramichelle Michelle    iron sucrose 200 mg Intravenous Once per day on Mon Wed Fri Ronal Verduzco DO Last Rate: 200 mg (02/18/19 1231)   metoprolol 5 mg Intravenous Q6H PRN Savannah Oro DO    metoprolol succinate 100 mg Oral Q12H Sukhjinder Perry DO    nystatin  Topical BID Wilhemenia Spatz, PA-C    oxyCODONE 10 mg Oral Q4H PRN Anna Mai MD    polyethylene glycol 17 g Oral Daily KATHY Mathias    polyvinyl alcohol 1 drop Both Eyes Q3H PRN Lara Barron PA-C    QUEtiapine 25 mg Oral HS Lara Barron PA-C    senna-docusate sodium 1 tablet Oral BID Janette Harmon DO    torsemide 80 mg Oral Daily Michael Quintanilla DO    warfarin 2 mg Oral Daily (warfarin) Janette Harmon DO         Today, Patient Was Seen By: Janette Harmon DO    ** Please Note: This note has been constructed using a voice recognition system   **

## 2019-02-18 NOTE — PHYSICAL THERAPY NOTE
Physical Therapy Cancellation Note    The pt  Is off of the floor at interventional radiology for HD cath  Will continue to follow and re-attempt as able      Nicole Alston PTA

## 2019-02-18 NOTE — RESPIRATORY THERAPY NOTE
resp care      02/18/19 9727   Respiratory Assessment   Assessment Type Assess only   General Appearance Awake;Drowsy   Respiratory Pattern Normal   Chest Assessment Chest expansion symmetrical   Bilateral Breath Sounds Diminished;Clear   Subjective Data Pt has no resp c/o  bs clear  Pt taken off bipap and placed on nc  Pt uses no pulm meds at home  Has not needed prn udn  Will d/c prn udn and d/c pt from resp protocol

## 2019-02-18 NOTE — PROGRESS NOTES
NEPHROLOGY PROGRESS NOTE   Pradip Barnes 60 y o  male MRN: [id number]  Unit/Bed#: [facility] 507-01 Encounter: [id number]      ASSESSMENT & PLAN:    1  Acute kidney injury secondary to ATN will be in acute as an outpatient Tuesday Thursday Saturday  2  Access: Tunneled dialysis catheter replaced by IR today  3  MSSA bacteremia on 6 weeks of antibiotics  4  Bioprosthetic valve with no evidence of vegetation  5  Atrial fibrillation  6  Anemia of chronic kidney disease  7  Ischemic cardiomyopathy with an EF 30%  8  CKD bone mineral disease    -seen on hemodialysis at approximately 12:30 p m   Tolerating treatment  -blood pressure is acceptable  -will need to ultrafilter further given volume overload  -heart rate increased into the 130s today  -ongoing antibiotics for bacteremia  -status post blood transfusion  -encephalopathy with concerns of a CVA would hold KIMBERLY   -continue Calcium Acetate last phosphorus was 8 9    SUBJECTIVE:    Patient seen denies any chest pain shortness of breath fevers or chills    OBJECTIVE:  Current Weight: Weight - Scale: (!) 162 kg (357 lb 9 4 oz)  Vitals:    02/18/19 1230   BP: 118/63   Pulse: 57   Resp:    Temp:    SpO2:        Intake/Output Summary (Last 24 hours) at 2/18/2019 1244  Last data filed at 2/18/2019 1130  Gross per 24 hour   Intake 320 ml   Output 0 ml   Net 320 ml       General: conscious, cooperative, in not acute distress  Eyes: conjunctivae pink, anicteric sclerae  ENT: lips and mucous membranes moist  Neck: supple, no JVD  Chest: clear breath sounds bilateral, no crackles, ronchus or wheezings  CVS: distinct S1 & S2, normal rate, regular rhythm  Abdomen: soft, non-tender, non-distended, normoactive bowel sounds  Extremities:  2+ edema  Skin: no rash  Neuro: awake, alert, oriented        Medications:    Current Facility-Administered Medications:     acetaminophen (TYLENOL) tablet 650 mg, 650 mg, Oral, Q6H PRN, 4401A Mk Faust MD, 650 mg at 02/13/19 1732    amiodarone tablet 200 mg, 200 mg, Oral, Daily With Breakfast, Bonnie Coley MD, 200 mg at 02/18/19 1151    aspirin (ECOTRIN LOW STRENGTH) EC tablet 81 mg, 81 mg, Oral, Daily, Dorjeison Patel, DO, 81 mg at 02/17/19 1035    atorvastatin (LIPITOR) tablet 40 mg, 40 mg, Oral, Daily With Louann Bamberger, DO, 40 mg at 02/17/19 1546    bisacodyl (DULCOLAX) rectal suppository 10 mg, 10 mg, Rectal, Daily PRN, Jill Acharya PA-C    calcium acetate (PHOSLO) capsule 1,334 mg, 1,334 mg, Oral, TID With Meals, Reg Quintanilla DO, 1,334 mg at 02/17/19 1546    ceFAZolin (ANCEF) 2,000 mg in sodium chloride 0 9 % 50 mL IVPB, 2,000 mg, Intravenous, Once per day on Mon Wed **AND** ceFAZolin (ANCEF) 3,000 mg in dextrose 5 % 100 mL IVPB, 3,000 mg, Intravenous, Once per day on Fri, Lisa Lutz MD, Stopped at 02/15/19 1734    diphenhydrAMINE (BENADRYL) tablet 25 mg, 25 mg, Oral, Q6H PRN, Dorjeison Distance, DO, 25 mg at 02/17/19 1035    guaiFENesin (MUCINEX) 12 hr tablet 600 mg, 600 mg, Oral, Q12H [address], Ileene Canshanon, HERBERT, 600 mg at 02/17/19 2153    hydrocortisone 1 % cream, , Topical, BID, Dorena Distance, DO    HYDROmorphone (DILAUDID) injection 1 mg, 1 mg, Intravenous, Q3H PRN, Lidya Elshaikh, 1 mg at 02/11/19 1600    iron sucrose (VENOFER) 200 mg in sodium chloride 0 9 % 100 mL IVPB, 200 mg, Intravenous, Once per day on Mon Wed Fri, Alexy Jones DO, Last Rate: 110 mL/hr at 02/18/19 1231, 200 mg at 02/18/19 1231    metoprolol (LOPRESSOR) injection 5 mg, 5 mg, Intravenous, Q6H PRN, Savannah Oro DO, 5 mg at 02/11/19 1559    metoprolol succinate (TOPROL-XL) 24 hr tablet 75 mg, 75 mg, Oral, Q12H, Raman Royal MD, 75 mg at 02/17/19 2152    nystatin (MYCOSTATIN) powder, , Topical, BID, Jillnatalie Acharya PA-C    oxyCODONE (ROXICODONE) immediate release tablet 10 mg, 10 mg, Oral, Q4H PRN, Chantal Delgado MD, 10 mg at 02/18/19 1006    polyethylene glycol (MIRALAX) packet 17 g, 17 g, Oral, Daily, KATHY Mathias, 17 g at 02/17/19 1036    polyvinyl alcohol (LIQUIFILM TEARS) 1 4 % ophthalmic solution 1 drop, 1 drop, Both Eyes, Q3H PRN, Fabiana Nguyen PA-C, 1 drop at 02/13/19 1724    QUEtiapine (SEROquel) tablet 25 mg, 25 mg, Oral, HS, Fabiana Nguyen PA-C, 25 mg at 02/17/19 2152    senna-docusate sodium (SENOKOT S) 8 6-50 mg per tablet 1 tablet, 1 tablet, Oral, BID, Zurdo DO Jayda, 1 tablet at 02/17/19 1035    torsemide (DEMADEX) tablet 80 mg, 80 mg, Oral, Daily, Genette Eis Quintanilla DO, 80 mg at 02/17/19 1035    Invasive Devices:      Lab Results:   Results from last 7 days   Lab Units 02/18/19  0909 02/17/19  0704 02/15/19  1358 02/13/19  0509 02/12/19  0501   WBC Thousand/uL 10 54* 9 42  --   --  10 13   HEMOGLOBIN g/dL 7 0* 7 5*  --   --  7 4*   HEMATOCRIT % 23 1* 25 3*  --   --  24 3*   PLATELETS Thousands/uL 238 239  --   --  212   POTASSIUM mmol/L  --   --  4 2 4 9 4 6   CHLORIDE mmol/L  --   --  93* 92* 94*   CO2 mmol/L  --   --  25 22 24   BUN mg/dL  --   --  93* 101* 86*   CREATININE mg/dL  --   --  7 14* 7 74* 6 64*   CALCIUM mg/dL  --   --  8 2* 7 6* 7 7*   MAGNESIUM mg/dL  --   --   --   --  2 7*   PHOSPHORUS mg/dL  --   --   --   --  8 9*   ALK PHOS U/L  --   --  72  --   --    ALT U/L  --   --  7*  --   --    AST U/L  --   --  21  --   --        Previous work up:  Please see previous notes

## 2019-02-18 NOTE — PROGRESS NOTES
Cardiology Progress Note - Yulissa Mayers 60 y o  male MRN: [id number]    Unit/Bed#: [facility] 507-01 Encounter: [id number]  Assessment and plan  1  Mixed shock primarily sepsis  2  New cardiomyopathy suspect stress-induced  3  Bioprosthetic AVR  4  Type 2 myocardial infarction without ST elevation  5  Acute kidney injury now on dialysis  6  Atrial flutter    Recommendations:  Overall patient slowly improving  Heart rates were fast today in atrial flutter increase Toprol  mg q 12  Continue anticoagulation  Patient needs to be more stable before considering cardioversion  Check limited echo      Subjective:    No significant events overnight  Resting comfortably had temporary tunneled catheter placed for    ROS    Objective:   Vitals: Blood pressure 133/73, pulse (!) 40, temperature 98 7 °F (37 1 °C), temperature source Oral, resp  rate 18, height 5' 9" (1 753 m), weight (!) 162 kg (357 lb 9 4 oz), SpO2 97 %  , Body mass index is 52 81 kg/m² ,   Orthostatic Blood Pressures      Most Recent Value   Blood Pressure  133/73 filed at 02/18/2019 1500   Patient Position - Orthostatic VS  Lying filed at 02/18/2019 5979         Systolic (91WFD), FUQ:942 , Min:98 , LFS:701     Diastolic (58DGR), AIL:43, Min:26, Max:95      Intake/Output Summary (Last 24 hours) at 2/18/2019 1536  Last data filed at 2/18/2019 1400  Gross per 24 hour   Intake 620 ml   Output 2500 ml   Net -1880 ml     Weight (last 2 days)     Date/Time   Weight    02/18/19 0437   162 (357 59)  (Abnormal)     02/17/19 0600   167 (367 73)  (Abnormal)                 Telemetry Review: No significant arrhythmias seen on telemetry review  EKG personally reviewed by Samira Thornton DO  Physical Exam   Constitutional: He is oriented to person, place, and time  He appears well-nourished  No distress  HENT:   Head: Atraumatic  Eyes: Pupils are equal, round, and reactive to light  Conjunctivae are normal    Neck: Neck supple     Cardiovascular: Normal rate and regular rhythm  Exam reveals no friction rub  Murmur heard  Pulmonary/Chest: Effort normal  No respiratory distress  He has decreased breath sounds  He has no wheezes  He has no rhonchi  He has no rales  Abdominal: Bowel sounds are normal  He exhibits no distension  There is no tenderness  There is no rebound  Musculoskeletal: He exhibits no edema  Neurological: He is alert and oriented to person, place, and time  No cranial nerve deficit  Skin: Skin is warm and dry  No erythema  Nursing note and vitals reviewed  Laboratory Results:        CBC with diff:   Results from last 7 days   Lab Units 02/18/19  0909 02/17/19  0704 02/12/19  0501   WBC Thousand/uL 10 54* 9 42 10 13   HEMOGLOBIN g/dL 7 0* 7 5* 7 4*   HEMATOCRIT % 23 1* 25 3* 24 3*   MCV fL 88 87 86   PLATELETS Thousands/uL 238 239 212   MCH pg 26 3* 25 8* 26 2*   MCHC g/dL 29 9* 29 6* 30 5*   RDW % 17 3* 17 2* 17 7*   MPV fL 10 8 11 3 11 0   NRBC AUTO /100 WBCs 0 0 0         CMP:  Results from last 7 days   Lab Units 02/15/19  1358 02/13/19  0509 02/12/19  0501   POTASSIUM mmol/L 4 2 4 9 4 6   CHLORIDE mmol/L 93* 92* 94*   CO2 mmol/L 25 22 24   BUN mg/dL 93* 101* 86*   CREATININE mg/dL 7 14* 7 74* 6 64*   CALCIUM mg/dL 8 2* 7 6* 7 7*   AST U/L 21  --   --    ALT U/L 7*  --   --    ALK PHOS U/L 72  --   --    EGFR ml/min/1 73sq m 8 7 8         BMP:  Results from last 7 days   Lab Units 02/15/19  1358 02/13/19  0509 02/12/19  0501   POTASSIUM mmol/L 4 2 4 9 4 6   CHLORIDE mmol/L 93* 92* 94*   CO2 mmol/L 25 22 24   BUN mg/dL 93* 101* 86*   CREATININE mg/dL 7 14* 7 74* 6 64*   CALCIUM mg/dL 8 2* 7 6* 7 7*       BNP: No results for input(s): BNP in the last 72 hours      Magnesium:   Results from last 7 days   Lab Units 02/12/19  0501   MAGNESIUM mg/dL 2 7*       Coags:   Results from last 7 days   Lab Units 02/18/19  0909 02/17/19  1027 02/16/19  0443 02/15/19  1358 02/14/19  0754 02/14/19  0635 02/13/19  0509   INR  1 73* 4 27* 3 73* 3 29* 3 41* 3 47* 4 06*       TSH:        Hemoglobin A1C   Results from last 7 days   Lab Units 19  0509   HEMOGLOBIN A1C % 6 2       Lipid Profile:   Results from last 7 days   Lab Units 19  0509   TRIGLYCERIDES mg/dL 129   HDL mg/dL 44       Cardiac testing:   Results for orders placed during the hospital encounter of 19   Echo complete with contrast if indicated    Narrative [address]  [address], [address]  [phone]    Transthoracic Echocardiogram  2D, Doppler, and Color Doppler    Study date:  2019    Patient: Aleja Kendall  MR number: [id number]  Account number: [de-identified]  : 1960  Age: 60 years  Gender: Male  Status: Inpatient  Location: Bedside  Height: 72 in  Weight: 339 lb  BP: 89/ 54 mmHg    Indications: chest pain    Diagnoses: R07 9 - Chest pain, unspecified    Sonographer:  Sharon 61, CCT  Primary Physician:  Frances Aguilar DO  Referring Physician:  Osorio De Jesus DO  Group:  [id number]  Interpreting Physician:  Greg Painting DO    SUMMARY    PROCEDURE INFORMATION:  This was a technically difficult study despite the administration of echo contrast     LEFT VENTRICLE:  Systolic function was markedly reduced  Ejection fraction was estimated to be 30 %  There was severe diffuse hypokinesis with no obvious identifiable regional variations  Wall thickness was moderately increased  Concentric hypertrophy was present  VENTRICULAR SEPTUM:  There was dyssynergic motion  RIGHT VENTRICLE:  The ventricle was mildly dilated  Systolic function was moderately to markedly reduced  LEFT ATRIUM:  The atrium was mildly dilated  RIGHT ATRIUM:  The atrium was mildly dilated  AORTIC VALVE:  A bioprosthesis was present  It was not well visualized  Mean transvalvular gradient 13 mmHg  TRICUSPID VALVE:  There was mild regurgitation      PERICARDIUM:  A small, partially loculated pericardial effusion was identified along the left ventricular free wall  HISTORY: PRIOR HISTORY: Aortic valve prosthesis    PROCEDURE: The procedure was performed at the bedside  This was a routine study  The transthoracic approach was used  The study included complete 2D imaging, complete spectral Doppler, and color Doppler  The heart rate was 80 bpm, at the  start of the study  Intravenous contrast (Definity solution [1 3 ml Definity/8 7ml normal saline solution]) was administered  Intravenous contrast (Definity solution [1 3 ml Definity/8 7ml normal saline solution]) was administered to  opacify the left ventricle and enhance Doppler signals  Echocardiographic views were limited due to low windows and patient on mechanical ventilator  This was a technically difficult study despite the administration of echo contrast     LEFT VENTRICLE: Size was normal  Systolic function was markedly reduced  Ejection fraction was estimated to be 30 %  There was severe diffuse hypokinesis with no obvious identifiable regional variations  Wall thickness was moderately  increased  Concentric hypertrophy was present  DOPPLER: Normal sinus rhythm was absent  The study was not technically sufficient to allow evaluation of LV diastolic function  VENTRICULAR SEPTUM: There was dyssynergic motion  RIGHT VENTRICLE: The ventricle was mildly dilated  Systolic function was moderately to markedly reduced  LEFT ATRIUM: The atrium was mildly dilated  RIGHT ATRIUM: The atrium was mildly dilated  MITRAL VALVE: Not well visualized  DOPPLER: The transmitral velocity was within the normal range  There was no evidence for stenosis  There was no significant regurgitation  AORTIC VALVE: A bioprosthesis was present  It was not well visualized  Mean transvalvular gradient 13 mmHg  DOPPLER: There was no significant regurgitation  TRICUSPID VALVE: The valve structure was normal  There was normal leaflet separation   DOPPLER: The transtricuspid velocity was within the normal range  There was no evidence for stenosis  There was mild regurgitation  The tricuspid jet  envelope definition was inadequate for estimation of RV systolic pressure  PULMONIC VALVE: Leaflets exhibited normal thickness, no calcification, and normal cuspal separation  DOPPLER: The transpulmonic velocity was within the normal range  There was no significant regurgitation  PERICARDIUM: A small, partially loculated pericardial effusion was identified along the left ventricular free wall  The pericardium was normal in appearance  AORTA: The root exhibited normal size  SYSTEM MEASUREMENT TABLES    2D  %FS: 15 83 %  Ao Diam: 3 09 cm  EDV(Teich): 221 88 ml  EF(Teich): 32 54 %  ESV(Teich): 149 69 ml  IVSd: 1 59 cm  LA Diam: 5 18 cm  LVIDd: 6 58 cm  LVIDs: 5 54 cm  LVPWd: 1 43 cm  SV(Teich): 72 19 ml    CW  AV Env  Ti: 233 56 ms  AV VTI: 49 77 cm  AV Vmax: 2 64 m/s  AV Vmax: 2 67 m/s  AV Vmean: 2 13 m/s  AV maxP 95 mmHg  AV maxP 53 mmHg  AV meanP 73 mmHg    PW  LVOT Env  Ti: 215 22 ms  LVOT VTI: 11 41 cm  LVOT Vmax: 0 59 m/s  LVOT Vmax: 0 7 m/s  LVOT Vmean: 0 52 m/s  LVOT maxP 78 mmHg  LVOT maxP mmHg  LVOT meanP 24 mmHg  MV E Deep: 1 01 m/s    [facility]    Prepared and electronically signed by    Yuliana Alejandra DO  Signed 2019 10:14:55       Results for orders placed during the hospital encounter of 19   CHON    Narrative [address]  [facility], [address] [place] [address]  [phone]    Transesophageal Echocardiogram  2D, Doppler, and Color Doppler    Study date:  2019    Patient: Rita Olivier  MR number: [id number]  Account number: [de-identified]  : 1960  Age: 60 years  Gender: Male  Status: Inpatient  Location: Bedside  Height: 69 in  Weight: 319 lb  BP: 101/ 54 mmHg    Indications: Bacteremia      Diagnoses: R78 81 - Bacteremia    Sonographer: Rickey Lombardi, [facility]  Interpreting Physician:  Levi Kamara DO  Primary Physician:  Eliz [place] DO  Referring Physician:  Berenice Crawford DO  Group:  [id number]  Cardiology Fellow:  Nyla Khanna MD    IMPRESSIONS:  There was no echocardiographic evidence for valvular vegetation  SUMMARY    LEFT VENTRICLE:  Systolic function was moderately reduced  Ejection fraction was estimated to be 40 %  There was moderate diffuse hypokinesis  Wall thickness was mildly increased  There was mild concentric hypertrophy  RIGHT VENTRICLE:  The size was normal   Systolic function was mildly reduced  LEFT ATRIUM:  The atrium was mildly dilated  ATRIAL SEPTUM:  There was a small patent foramen ovale with left to right shunt identified by color Doppler  RIGHT ATRIUM:  The atrium was mildly dilated  MITRAL VALVE:  There was trace regurgitation  There was no echocardiographic evidence of vegetation  AORTIC VALVE:  A bioprosthesis was present  It exhibited normal function  There was no echocardiographic evidence of vegetation  TRICUSPID VALVE:  There was mild regurgitation  There was no echocardiographic evidence of vegetation  HISTORY: PRIOR HISTORY: Aortic valve replacement, NSTEMI, Cardiomyopathy, Acute kidney injury  PROCEDURE: The procedure was performed at the bedside  This was a routine study  The risks and alternatives of the procedure were explained to the patient's next of kin and informed consent was obtained  The transesophageal approach was  used  The study included complete 2D imaging, complete spectral Doppler, and color Doppler  The heart rate was 113 bpm, at the start of the study  An adult omniplane probe was inserted by the cardiology fellow under direct supervision of  the attending cardiologist  Intubated with ease  One intubation attempt(s)  There was no blood detected on the probe  Image quality was adequate   There were no complications during the procedure  MEDICATIONS: Sedation administered by  bedside nurse  LEFT VENTRICLE: Size was normal  Systolic function was moderately reduced  Ejection fraction was estimated to be 40 %  There was moderate diffuse hypokinesis  Wall thickness was mildly increased  There was mild concentric hypertrophy  DOPPLER: The study was not technically sufficient to allow evaluation of LV diastolic function  RIGHT VENTRICLE: The size was normal  Systolic function was mildly reduced  Wall thickness was normal     LEFT ATRIUM: The atrium was mildly dilated  No thrombus was identified  APPENDAGE: The appendage was small  No thrombus was identified  DOPPLER: The function was normal (normal emptying velocity)  ATRIAL SEPTUM: There was a small patent foramen ovale with left to right shunt identified by color Doppler  RIGHT ATRIUM: The atrium was mildly dilated  No thrombus was identified  MITRAL VALVE: Valve structure was normal  There was normal leaflet separation  There was no echocardiographic evidence of vegetation  DOPPLER: There was trace regurgitation  AORTIC VALVE: A bioprosthesis was present  It exhibited normal function  There was no echocardiographic evidence of vegetation  TRICUSPID VALVE: The valve structure was normal  There was normal leaflet separation  There was no echocardiographic evidence of vegetation  DOPPLER: There was mild regurgitation  PULMONIC VALVE: Leaflets exhibited normal thickness, no calcification, and normal cuspal separation  There was no echocardiographic evidence of vegetation  DOPPLER: There was no significant regurgitation  PERICARDIUM: There was no pericardial effusion seen in the images obtained  AORTA: The root exhibited normal size  There was no atheroma  There was no evidence for dissection  There was no evidence for aneurysm      [facility]    Prepared and electronically signed by    Clifford Sorenson DO  Signed 25-Jan-2019 14:01:14       No results found for this or any previous visit  No results found for this or any previous visit      Meds/Allergies   all current active meds have been reviewed  Medications Prior to Admission   Medication    amLODIPine (NORVASC) 5 mg tablet    ascorbic acid (VITAMIN C) 500 MG tablet    aspirin (ECOTRIN) 325 mg EC tablet    fluticasone (FLONASE) 50 mcg/act nasal spray    loratadine (CLARITIN) 10 mg tablet    metoprolol tartrate (LOPRESSOR) 100 mg tablet    potassium chloride (K-DUR,KLOR-CON) 20 mEq tablet    pravastatin (PRAVACHOL) 40 mg tablet    torsemide (DEMADEX) 20 mg tablet          Assessment:  Principal Problem:    Staphylococcus aureus bacteremia  Active Problems:    Increased BMI    Stress-induced cardiomyopathy    Acute respiratory failure with hypoxia (HCC)    History of aortic valve replacement with bioprosthetic valve    Atrial fibrillation ([facility])    Transaminitis    Anemia    Leukocytosis    Cerebrovascular accident ([address] )    PATRICK (acute kidney injury) ([facility] [address] )    Hypervolemia    Acute systolic CHF (congestive heart failure) ([facility])    Toxic metabolic encephalopathy    Skin rash

## 2019-02-18 NOTE — PLAN OF CARE
Problem: Potential for Falls  Goal: Patient will remain free of falls  Description  INTERVENTIONS:  - Assess patient frequently for physical needs  -  Identify cognitive and physical deficits and behaviors that affect risk of falls  -  [place] fall precautions as indicated by assessment   - Educate patient/family on patient safety including physical limitations  - Instruct patient to call for assistance with activity based on assessment  - Modify environment to reduce risk of injury  - Consider OT/PT consult to assist with strengthening/mobility    Outcome: Progressing     Problem: Prexisting or High Potential for Compromised Skin Integrity  Goal: Skin integrity is maintained or improved  Description  INTERVENTIONS:  - Identify patients at risk for skin breakdown  - Assess and monitor skin integrity  - Assess and monitor nutrition and hydration status  - Monitor labs (i e  albumin)  - Assess for incontinence   - Turn and reposition patient  - Assist with mobility/ambulation  - Relieve pressure over bony prominences  - Avoid friction and shearing  - Provide appropriate hygiene as needed including keeping skin clean and dry  - Evaluate need for skin moisturizer/barrier cream  - Collaborate with interdisciplinary team (i e  Nutrition, Rehabilitation, etc )   - Patient/family teaching   Outcome: Progressing     Problem: Nutrition/Hydration-ADULT  Goal: Nutrient/Hydration intake appropriate for improving, restoring or maintaining nutritional needs  Description  Monitor and assess patient's nutrition/hydration status for malnutrition (ex- brittle hair, bruises, dry skin, pale skin and conjunctiva, muscle wasting, smooth red tongue, and disorientation)  Collaborate with interdisciplinary team and initiate plan and interventions as ordered  Monitor patient's weight and dietary intake as ordered or per policy  Utilize nutrition screening tool and intervene per policy   Determine patient's food preferences and provide high-protein, high-caloric foods as appropriate  INTERVENTIONS:  - Monitor oral intake, urinary output, labs, and treatment plans  - Assess nutrition and hydration status and recommend course of action  - Evaluate amount of meals eaten  - Assist patient with eating if necessary   - Allow adequate time for meals  - Recommend/ encourage appropriate diets, oral nutritional supplements, and vitamin/mineral supplements  - Order, calculate, and assess calorie counts as needed  - Recommend, monitor, and adjust tube feedings and TPN/PPN based on assessed needs  - Assess need for intravenous fluids  - Provide specific nutrition/hydration education as appropriate  - Include patient/family/caregiver in decisions related to nutrition   Outcome: Progressing     Problem: CARDIOVASCULAR - ADULT  Goal: Maintains optimal cardiac output and hemodynamic stability  Description  INTERVENTIONS:  - Monitor I/O, vital signs and rhythm  - Monitor for S/S and trends of decreased cardiac output i e  bleeding, hypotension  - Administer and titrate ordered vasoactive medications to optimize hemodynamic stability  - Assess quality of pulses, skin color and temperature  - Assess for signs of decreased coronary artery perfusion - ex   Angina  - Instruct patient to report change in severity of symptoms   Outcome: Progressing  Goal: Absence of cardiac dysrhythmias or at baseline rhythm  Description  INTERVENTIONS:  - Continuous cardiac monitoring, monitor vital signs, obtain 12 lead EKG if indicated  - Administer antiarrhythmic and heart rate control medications as ordered  - Monitor electrolytes and administer replacement therapy as ordered   Outcome: Progressing     Problem: METABOLIC, FLUID AND ELECTROLYTES - ADULT  Goal: Electrolytes maintained within normal limits  Description  INTERVENTIONS:  - Monitor labs and assess patient for signs and symptoms of electrolyte imbalances  - Administer electrolyte replacement as ordered  - Monitor response to electrolyte replacements, including repeat lab results as appropriate  - Instruct patient on fluid and nutrition as appropriate   Outcome: Progressing  Goal: Fluid balance maintained  Description  INTERVENTIONS:  - Monitor labs and assess for signs and symptoms of volume excess or deficit  - Monitor I/O and WT  - Instruct patient on fluid and nutrition as appropriate   Outcome: Progressing     Problem: PAIN - ADULT  Goal: Verbalizes/displays adequate comfort level or baseline comfort level  Description  Interventions:  - Encourage patient to monitor pain and request assistance  - Assess pain using appropriate pain scale  - Administer analgesics based on type and severity of pain and evaluate response  - Implement non-pharmacological measures as appropriate and evaluate response  - Consider cultural and social influences on pain and pain management  - Notify physician/advanced practitioner if interventions unsuccessful or patient reports new pain   Outcome: Progressing     Problem: INFECTION - ADULT  Goal: Absence or prevention of progression during hospitalization  Description  INTERVENTIONS:  - Assess and monitor for signs and symptoms of infection  - Monitor lab/diagnostic results  - Monitor all insertion sites, i e  indwelling lines, tubes, and drains  - Monitor endotracheal (as able) and nasal secretions for changes in amount and color  - [place] appropriate cooling/warming therapies per order  - Administer medications as ordered  - Instruct and encourage patient and family to use good hand hygiene technique  - Identify and instruct in appropriate isolation precautions for identified infection/condition    Outcome: Progressing     Problem: SAFETY ADULT  Goal: Patient will remain free of falls  Description  INTERVENTIONS:  - Assess patient frequently for physical needs  -  Identify cognitive and physical deficits and behaviors that affect risk of falls    -  [place] fall precautions as indicated by assessment   - Educate patient/family on patient safety including physical limitations  - Instruct patient to call for assistance with activity based on assessment  - Modify environment to reduce risk of injury  - Consider OT/PT consult to assist with strengthening/mobility    Outcome: Progressing  Goal: Maintain or return to baseline ADL function  Description  INTERVENTIONS:  -  Assess patient's ability to carry out ADLs; assess patient's baseline for ADL function and identify physical deficits which impact ability to perform ADLs (bathing, care of mouth/teeth, toileting, grooming, dressing, etc )  - Assess/evaluate cause of self-care deficits   - Assess range of motion  - Assess patient's mobility; develop plan if impaired  - Assess patient's need for assistive devices and provide as appropriate  - Encourage maximum independence but intervene and supervise when necessary  ¯ Involve family in performance of ADLs  ¯ Assess for home care needs following discharge   ¯ Request OT consult to assist with ADL evaluation and planning for discharge  ¯ Provide patient education as appropriate    Outcome: Progressing  Goal: Maintain or return mobility status to optimal level  Description  INTERVENTIONS:  - Assess patient's baseline mobility status (ambulation, transfers, stairs, etc )    - Identify cognitive and physical deficits and behaviors that affect mobility  - Identify mobility aids required to assist with transfers and/or ambulation (gait belt, sit-to-stand, lift, walker, cane, etc )  - [place] fall precautions as indicated by assessment  - Record patient progress and toleration of activity level on Mobility SBAR; progress patient to next Phase/Stage  - Instruct patient to call for assistance with activity based on assessment  - Request Rehabilitation consult to assist with strengthening/weightbearing, etc     Outcome: Progressing     Problem: DISCHARGE PLANNING  Goal: Discharge to home or other facility with appropriate resources  Description  INTERVENTIONS:  - Identify barriers to discharge w/patient and caregiver  - Arrange for needed discharge resources and transportation as appropriate  - Identify discharge learning needs (meds, wound care, etc )  - Arrange for interpretive services to assist at discharge as needed  - Refer to Case Management Department for coordinating discharge planning if the patient needs post-hospital services based on physician/advanced practitioner order or complex needs related to functional status, cognitive ability, or social support system   Outcome: Progressing     Problem: Knowledge Deficit  Goal: Patient/family/caregiver demonstrates understanding of disease process, treatment plan, medications, and discharge instructions  Description  Complete learning assessment and assess knowledge base    Interventions:  - Provide teaching at level of understanding  - Provide teaching via preferred learning methods   Outcome: Progressing     Problem: GENITOURINARY - ADULT  Goal: Maintains or returns to baseline urinary function  Description  INTERVENTIONS:  - Assess urinary function  - Encourage oral fluids to ensure adequate hydration  - Administer IV fluids as ordered to ensure adequate hydration  - Administer ordered medications as needed  - Offer frequent toileting  - Follow urinary retention protocol if ordered   Outcome: Progressing  Goal: Absence of urinary retention  Description  INTERVENTIONS:  - Assess patient?s ability to void and empty bladder  - Monitor I/O  - Bladder scan as needed  - Discuss with physician/AP medications to alleviate retention as needed  - Discuss catheterization for long term situations as appropriate   Outcome: Progressing     Problem: DISCHARGE PLANNING - CARE MANAGEMENT  Goal: Discharge to post-acute care or home with appropriate resources  Description  INTERVENTIONS:  - Conduct assessment to determine patient/family and health care team treatment goals, and need for post-acute services based on payer coverage, community resources, and patient preferences, and barriers to discharge  - Address psychosocial, clinical, and financial barriers to discharge as identified in assessment in conjunction with the patient/family and health care team  - Arrange appropriate level of post-acute services according to patient's   needs and preference and payer coverage in collaboration with the physician and health care team  - Communicate with and update the patient/family, physician, and health care team regarding progress on the discharge plan  - Arrange appropriate transportation to post-acute venues  - Pt to d/c with appropriate resources when medically stable     Outcome: Progressing

## 2019-02-18 NOTE — SOCIAL WORK
Message received from Rickey, Radha Sykes 1154 admissions that patient has schedule at [facility], TTS 10:45am  He is approved at [facility] and will need insurance auth for snf

## 2019-02-18 NOTE — PROGRESS NOTES
Progress Note - Infectious Disease   Deepak Reeves 60 y o  male MRN: [id number]  Unit/Bed#: [facility] 507-01 Encounter: [id number]      Impression/Plan:  1  Severe sepsis/septic shock   Fever, tachycardia, leukocytosis, hypotension   Also with component of cardiogenic shock  Likely precipitated by MSSA bacteremia   No other clear evidence of acute infection identified   Fevers, procalcitonin improved   Leukocytosis mild      -antibiotic plan as below   -monitor temperatures and hemodynamics  -ongoing supportive care as per primary     2  MSSA bacteremia   Possibility of endocarditis considered in the setting of bioprosthetic AVR   CHON with no evidence of valvular vegetations   Initial portal of entry may have been related to multiple upper back wounds   CT chest/abdomen/pelvis with no other evidence of acute infection   Possible from pneumonia as sputum culture was positive for MSSA   Bacteremia eventually cleared   Podiatry evaluated patient's feet and no acute issues   Neurology evaluation noted with patient's recent changes in mental status, imaging abnormalities felt to be ischemic and similar compared to prior studies   No plan for MRI      -will change patient to vancomycin given development of rash  -neurology evaluation appreciated  -ongoing follow-up by Cardiology  -prolonged course of IV antibiotics of at least 6 weeks through 3/10  -patient will not require PICC line at discharge his antibiotics can be dosed with dialysis  -will plan to obtain surveillance cultures 1 week after completing antibiotics  -patient will require weekly labs including CBC with differential vancomycin trough while on antibiotics  -he will need follow-up in the ID office 2 weeks after discharge  -would repeat CBC with dialysis today  -office staff has been updated of the above      3  Rash: Per primary service patient started to develop rash on Sunday  It is noted to be flat and pruritic  Unclear etiology    Absolute eosinophil count slowly rising  Unclear if it is any particular medication and would question if this is a delayed response to Ancef     -will continue to monitor rash  -will change patient to vancomycin  -repeat CBC tomorrow  -pharmacy consult for vancomycin monitoring  -continue to trend fever curve/vitals    4  History of bioprosthetic AVR   Secondary to degenerative AS   Placed in 2014  Edith Link no evidence of endocarditis   Ongoing follow-up by Cardiology      5  Acute kidney injury   Likely ischemic/septic ATN    Patient remains on hemodialysis   He is status post PermCath placement      -ongoing follow-up by Nephrology  -antibiotics currently dosed with dialysis     6  Dorota Abbott had to have low level leukocytosis though he remains otherwise hemodynamically stable      -continue to monitor fever curve/vitals  -repeat CBC with dialysis today  -continue patient on Ancef as above  -if patient spikes fever please send repeat cultures     Above discussed with primary service  ID consult service will continue to follow  Antibiotics:  Ancef dosed with HD     24 hr events:  No acute events noted overnight on chart review  Patient remains afebrile  White blood cell count 10 5  No new culture data  No new images  Patient's other vitals are stable  No significant stool output noted    Subjective:  Patient seen at bedside today he continues to only be oriented x2  Continues to ramble on on exam   Was called to evaluate rash on the patient's abdomen  Her primary service this is been present since yesterday  The patient cannot tell me the timeline of his rash      Objective:  Vitals:  Temp:  [97 6 °F (36 4 °C)-98 6 °F (37 °C)] 98 6 °F (37 °C)  HR:  [55-86] 61  Resp:  [16-20] 20  BP: ()/(63-71) 119/68  SpO2:  [90 %-99 %] 99 %  Temp (24hrs), Av 1 °F (36 7 °C), Min:97 6 °F (36 4 °C), Max:98 6 °F (37 °C)  Current: Temperature: 98 6 °F (37 °C)    Physical Exam:   General Appearance:  Alert, interactive, nontoxic, no acute distress  Pleasantly confused   Throat: Oropharynx moist without lesions  Lungs:   Clear to auscultation bilaterally; no wheezes, rhonchi or rales; respirations unlabored   Heart:  Irregular rhythm; no murmur, rub or gallop   Abdomen:   Soft, non-tender, non-distended, positive bowel sounds  Extremities: No clubbing, cyanosis; ongoing edema in all of his extremities   Skin: Patient noted to have a flat patchy rash along the abdomen down to the groin  There red patches that are not connected but on the legs bilaterally  He has some erythema present on the back  He reports the lesions on his abdomen or itchy         Labs, Imaging, & Other studies:   All pertinent labs and imaging studies were personally reviewed  Results from last 7 days   Lab Units 02/18/19  0909 02/17/19  0704 02/12/19  0501   WBC Thousand/uL 10 54* 9 42 10 13   HEMOGLOBIN g/dL 7 0* 7 5* 7 4*   PLATELETS Thousands/uL 238 239 212     Results from last 7 days   Lab Units 02/15/19  1358   POTASSIUM mmol/L 4 2   CHLORIDE mmol/L 93*   CO2 mmol/L 25   BUN mg/dL 93*   CREATININE mg/dL 7 14*   EGFR ml/min/1 73sq m 8   CALCIUM mg/dL 8 2*   AST U/L 21   ALT U/L 7*   ALK PHOS U/L 72

## 2019-02-18 NOTE — OCCUPATIONAL THERAPY NOTE
Occupational Therapy Cancellation Note        Patient Name: Terrence ORTEGA Date: 2/18/2019      Chart reviewed  Pt off the floor at IR  OT will continue to follow as able      Colin Burnett MS, OTR/L

## 2019-02-18 NOTE — BRIEF OP NOTE (RAD/CATH)
Tunneled hemodialysis catheter placement Procedure Note    PATIENT NAME: Deepak Reeves  : 1960  MRN: [id number]     Pre-op Diagnosis:   1  Acute respiratory failure with hypoxia ([address] )    2  NSTEMI (non-ST elevation myocardial infarction) ([facility] [address] )    3  Severe sepsis with septic shock (CODE) ([facility] [address] )    4  PATRICK (acute kidney injury) ([facility] [address] )    5  Cardiogenic shock ([address] )    6  Septic shock ([address] )    7  Black nails    8  Deep tissue injury    9  Staphylococcus aureus bacteremia    10  Toxic metabolic encephalopathy    11  Change in mental status      Post-op Diagnosis:   1  Acute respiratory failure with hypoxia ([address] )    2  NSTEMI (non-ST elevation myocardial infarction) ([facility] [address] )    3  Severe sepsis with septic shock (CODE) ([facility] [address] )    4  PATRICK (acute kidney injury) ([facility] [address] )    5  Cardiogenic shock ([address] )    6  Septic shock ([address] )    7  Black nails    8  Deep tissue injury    9  Staphylococcus aureus bacteremia    10  Toxic metabolic encephalopathy    11  Change in mental status        Surgeon:   Dieudonne Vicente MD    Estimated Blood Loss: 3 cc    Findings: Successful tunneled hemodialysis catheter placement through right IJV access using 14 5 Fr 32 cm catheter      Specimens: None    Complications:  None    Anesthesia: Conscious sedation and Local    Dieudonne Vicente MD     Date: 2019  Time: 11:15 AM

## 2019-02-19 ENCOUNTER — APPOINTMENT (INPATIENT)
Dept: RADIOLOGY | Facility: HOSPITAL | Age: 59
DRG: 871 | End: 2019-02-19
Payer: COMMERCIAL

## 2019-02-19 ENCOUNTER — APPOINTMENT (INPATIENT)
Dept: NON INVASIVE DIAGNOSTICS | Facility: HOSPITAL | Age: 59
DRG: 871 | End: 2019-02-19
Payer: COMMERCIAL

## 2019-02-19 PROBLEM — E87.1 HYPONATREMIA: Status: ACTIVE | Noted: 2019-02-19

## 2019-02-19 PROBLEM — E83.9 CHRONIC KIDNEY DISEASE-MINERAL AND BONE DISORDER: Status: ACTIVE | Noted: 2019-02-19

## 2019-02-19 PROBLEM — N18.9 CHRONIC KIDNEY DISEASE-MINERAL AND BONE DISORDER: Status: ACTIVE | Noted: 2019-02-19

## 2019-02-19 PROBLEM — M89.9 CHRONIC KIDNEY DISEASE-MINERAL AND BONE DISORDER: Status: ACTIVE | Noted: 2019-02-19

## 2019-02-19 LAB
ABO GROUP BLD BPU: NORMAL
BPU ID: NORMAL
HCT VFR BLD AUTO: 24.6 % (ref 36.5–49.3)
HGB BLD-MCNC: 7.6 G/DL (ref 12–17)
UNIT DISPENSE STATUS: NORMAL
UNIT PRODUCT CODE: NORMAL
UNIT RH: NORMAL

## 2019-02-19 PROCEDURE — 99233 SBSQ HOSP IP/OBS HIGH 50: CPT | Performed by: INTERNAL MEDICINE

## 2019-02-19 PROCEDURE — 97535 SELF CARE MNGMENT TRAINING: CPT

## 2019-02-19 PROCEDURE — 97112 NEUROMUSCULAR REEDUCATION: CPT

## 2019-02-19 PROCEDURE — 97530 THERAPEUTIC ACTIVITIES: CPT

## 2019-02-19 PROCEDURE — 93308 TTE F-UP OR LMTD: CPT | Performed by: INTERNAL MEDICINE

## 2019-02-19 PROCEDURE — 99232 SBSQ HOSP IP/OBS MODERATE 35: CPT | Performed by: INTERNAL MEDICINE

## 2019-02-19 PROCEDURE — 85018 HEMOGLOBIN: CPT | Performed by: INTERNAL MEDICINE

## 2019-02-19 PROCEDURE — 93325 DOPPLER ECHO COLOR FLOW MAPG: CPT | Performed by: INTERNAL MEDICINE

## 2019-02-19 PROCEDURE — 85014 HEMATOCRIT: CPT | Performed by: INTERNAL MEDICINE

## 2019-02-19 PROCEDURE — 93321 DOPPLER ECHO F-UP/LMTD STD: CPT | Performed by: INTERNAL MEDICINE

## 2019-02-19 PROCEDURE — 94760 N-INVAS EAR/PLS OXIMETRY 1: CPT

## 2019-02-19 PROCEDURE — 93308 TTE F-UP OR LMTD: CPT

## 2019-02-19 PROCEDURE — 94660 CPAP INITIATION&MGMT: CPT

## 2019-02-19 PROCEDURE — 76705 ECHO EXAM OF ABDOMEN: CPT

## 2019-02-19 RX ADMIN — GUAIFENESIN 600 MG: 600 TABLET, EXTENDED RELEASE ORAL at 08:25

## 2019-02-19 RX ADMIN — ASPIRIN 81 MG: 81 TABLET, COATED ORAL at 08:24

## 2019-02-19 RX ADMIN — HYDROCORTISONE: 1 CREAM TOPICAL at 17:33

## 2019-02-19 RX ADMIN — CALCIUM ACETATE 1334 MG: 667 CAPSULE ORAL at 08:22

## 2019-02-19 RX ADMIN — OXYCODONE HYDROCHLORIDE 10 MG: 10 TABLET ORAL at 08:46

## 2019-02-19 RX ADMIN — GUAIFENESIN 600 MG: 600 TABLET, EXTENDED RELEASE ORAL at 21:02

## 2019-02-19 RX ADMIN — CALCIUM ACETATE 1334 MG: 667 CAPSULE ORAL at 15:40

## 2019-02-19 RX ADMIN — AMIODARONE HYDROCHLORIDE 200 MG: 200 TABLET ORAL at 08:25

## 2019-02-19 RX ADMIN — METOPROLOL SUCCINATE 100 MG: 100 TABLET, EXTENDED RELEASE ORAL at 08:25

## 2019-02-19 RX ADMIN — TORSEMIDE 80 MG: 20 TABLET ORAL at 08:23

## 2019-02-19 RX ADMIN — QUETIAPINE FUMARATE 25 MG: 25 TABLET ORAL at 21:02

## 2019-02-19 RX ADMIN — METOPROLOL SUCCINATE 100 MG: 100 TABLET, EXTENDED RELEASE ORAL at 21:02

## 2019-02-19 RX ADMIN — ATORVASTATIN CALCIUM 40 MG: 40 TABLET, FILM COATED ORAL at 15:40

## 2019-02-19 RX ADMIN — WARFARIN SODIUM 2 MG: 2 TABLET ORAL at 17:32

## 2019-02-19 RX ADMIN — SENNOSIDES AND DOCUSATE SODIUM 1 TABLET: 8.6; 5 TABLET ORAL at 17:32

## 2019-02-19 RX ADMIN — HYDROCORTISONE: 1 CREAM TOPICAL at 08:28

## 2019-02-19 RX ADMIN — OXYCODONE HYDROCHLORIDE 10 MG: 10 TABLET ORAL at 13:32

## 2019-02-19 RX ADMIN — CALCIUM ACETATE 1334 MG: 667 CAPSULE ORAL at 11:13

## 2019-02-19 RX ADMIN — NYSTATIN: 100000 POWDER TOPICAL at 17:33

## 2019-02-19 RX ADMIN — NYSTATIN: 100000 POWDER TOPICAL at 08:28

## 2019-02-19 RX ADMIN — DIPHENHYDRAMINE HCL 25 MG: 25 TABLET ORAL at 17:32

## 2019-02-19 NOTE — SOCIAL WORK
Spoke to Dr Cornel Ocampo, renal, who reports patient is not cleared for dc to snf  From renal standpoint  Update was sent to [facility]

## 2019-02-19 NOTE — PHYSICAL THERAPY NOTE
Physical Therapy Progress Note     02/19/19 3735   Pain Assessment   Pain Assessment 0-10   Pain Score 9   Pain Location Leg   Pain Orientation Bilateral   Hospital Pain Intervention(s) Repositioned; Emotional support   Response to Interventions Tolerated  Restrictions/Precautions   Other Precautions Telemetry;O2;Fall Risk;Pain   Subjective   Subjective The pt  notes that he is itchy everywhere, and that his legs are very painful today  He is agreeable to work with therapy  Bed Mobility   Supine to Sit 2  Maximal assistance   Additional items Assist x 3; Increased time required;Verbal cues;LE management   Sit to Supine 2  Maximal assistance   Additional items Assist x 3; Increased time required;Verbal cues;LE management   Transfers   Sit to Stand 1  Dependent   Additional items Assist x 3; Increased time required;Verbal cues; Other  (Only able to clear his buttocks )   Stand to Sit 1  Dependent   Additional items Assist x 3; Increased time required;Verbal cues   Balance   Static Sitting Fair -  (9 minutes and 37 seconds )   Dynamic Sitting Poor +   Activity Tolerance   Activity Tolerance Patient tolerated treatment well;Patient limited by pain   Nurse [address] [place], RN  Exercises   Knee AROM Short Arc Quad Supine;Left;Right;AAROM;10 reps;5 reps  (10 reps LLE, 5 reps RLE )   Knee AROM Long Arc Quad Sitting;Bilateral;AAROM;5 reps   Assessment   Prognosis Fair   Problem List Decreased strength;Decreased range of motion;Decreased endurance; Impaired balance;Decreased mobility; Decreased coordination;Decreased cognition; Impaired judgement;Decreased safety awareness;Pain;Obesity   Assessment The pt  was able to sit up at the edge of the bed for nearly ten minutes, but he requires significant assistance for any positional changes  He was able to clear his buttocks with maximal assistance of three persons, but he was unable to maintain this for more than a few seconds   He was notably limited in pain-free range of motion in BLE, and more notably in the LLE  He will require inpatient rehab at discharge in order to safely progress his functional mobility  Barriers to Discharge Inaccessible home environment;Decreased caregiver support   Goals   Patient Goals To drive his blazer again  STG Expiration Date 02/19/19   Treatment Day 5   Plan   Treatment/Interventions Functional transfer training;LE strengthening/ROM; Therapeutic exercise; Endurance training;Patient/family training;Bed mobility   Progress Slow progress, decreased activity tolerance   PT Frequency   (3-5x a week )   Recommendation   Recommendation Short-term skilled PT     Sophia Wong, PTA

## 2019-02-19 NOTE — PLAN OF CARE
Problem: OCCUPATIONAL THERAPY ADULT  Goal: Performs self-care activities at highest level of function for planned discharge setting  See evaluation for individualized goals  Description  Treatment Interventions: ADL retraining, Functional transfer training, UE strengthening/ROM, Endurance training, Cognitive reorientation, Patient/family training, Equipment evaluation/education, Fine motor coordination activities, Compensatory technique education, Continued evaluation, Energy conservation, Activityengagement          See flowsheet documentation for full assessment, interventions and recommendations  Note:   Limitation: Decreased ADL status, Decreased UE ROM, Decreased UE strength, Decreased Safe judgement during ADL, Decreased cognition, Decreased endurance, Decreased fine motor control, Decreased self-care trans, Decreased high-level ADLs  Prognosis: Fair  Assessment: Patient participated in Skilled OT session this date with interventions consisting of ADL re training with the use of correct body mechnaics, safety awareness and fall prevention techniques, therapeutic exercise to: increase functional use of BUEs, increase BUE muscle strength ,  therapeutic activities to: increase activity tolerance, increase postural control, increase trunk control and increase OOB/ sitting tolerance   Patient agreeable to OT treatment session, upon arrival patient was found supine in bed  In comparison to previous session, patient with improvements in ability to maintain seated position EOB w/ Min A*   Patient requiring frequent re direction, verbal cues for safety, verbal cues for correct technique and verbal cues for pacing thru activity steps  Patient continues to be functioning below baseline level, occupational performance remains limited secondary to factors listed above and increased risk for falls and injury  From OT standpoint, recommendation at time of d/c would be Short Term Rehab     Patient to benefit from continued Occupational Therapy treatment while in the hospital to address deficits as defined above and maximize level of functional independence with ADLs and functional mobility  OT Discharge Recommendation: Short Term Rehab  OT - OK to Discharge:  Yes

## 2019-02-19 NOTE — PROGRESS NOTES
Progress Note - Infectious Disease   Abel Almeida 60 y o  male MRN: [id number]  Unit/Bed#: [facility] 507-01 Encounter: [id number]      Impression/Plan:  1  MSSA bacteremia   Possibility of endocarditis considered in the setting of bioprosthetic AVR  Inessa Linen no evidence of valvular vegetations   Initial portal of entry may have been related to multiple upper back wounds   CT chest/abdomen/pelvis with no other evidence of acute infection   Possible from pneumonia as sputum culture was positive for MSSA   Bacteremia eventually cleared   Podiatry evaluated patient's feet and no acute issues   Neurology evaluation noted with patient's recent changes in mental status, imaging abnormalities felt to be ischemic and similar compared to prior studies   No plan for MRI      -continue vancomycin given development of rash  -prolonged course of IV antibiotics of at least 6 weeks through 3/10  -patient will not require PICC line at discharge his antibiotics can be dosed with dialysis  -will plan to obtain surveillance cultures 1 week after completing antibiotics  -patient will require weekly labs including CBC with differential and vancomycin trough  -he will need follow-up in the ID office 2 weeks after discharge  -office staff has been updated of the above  -repeat CBC tomorrow     2  Rash: Per primary service patient started to develop rash on Sunday  It is noted to be flat and pruritic  Unclear etiology  Absolute eosinophil count slowly rising  Unclear if it is any particular medication and would question if this is a delayed response to Ancef      -will continue to monitor rash  -age patient vancomycin  -repeat CBC tomorrow  -pharmacy consult for vancomycin monitoring  -continue to trend fever curve/vitals     3  History of bioprosthetic AVR   Secondary to degenerative AS   Placed in July 2014   Inessa Linen no evidence of endocarditis   Ongoing follow-up by Cardiology      4  Acute kidney injury   Likely ischemic/septic ATN   Patient remains on hemodialysis   He is status post PermCath placement      -ongoing follow-up by Nephrology  -antibiotics currently dosed with dialysis     5  Leukocytosis:  Patient had to have low level leukocytosis though he remains otherwise hemodynamically stable      -continue to monitor fever curve/vitals  -repeat CBC tomorrow  -continue antibiotics as above     ID consult service will continue to follow  Antibiotics:  Vancomycin    24 hr events:  Patient remains afebrile  No new micro data  No new images overnight  No acute events noted overnight on chart review  Patient's other vitals are stable    Subjective:  Patient currently denies having any nausea, vomiting, chest pain or shortness of breath  He continues to have ongoing rash diffusely across his abdomen on in his upper thigh  He reports ongoing itching which is bothering him  Objective:  Vitals:  Temp:  [98 1 °F (36 7 °C)-99 2 °F (37 3 °C)] 98 3 °F (36 8 °C)  HR:  [] 66  Resp:  [17-20] 17  BP: ()/(26-95) 112/60  SpO2:  [95 %-99 %] 99 %  Temp (24hrs), Av 6 °F (37 °C), Min:98 1 °F (36 7 °C), Max:99 2 °F (37 3 °C)  Current: Temperature: 98 3 °F (36 8 °C)    Physical Exam:   General Appearance:  Alert, interactive, nontoxic, no acute distress  Remains confused  Throat: Oropharynx moist without lesions  Lungs:   Decreased breath sounds throughout all lung fields; no wheezes, rhonchi or rales; respirations unlabored   Heart:  Irregular rhythm; no murmur, rub or gallop   Abdomen:   Soft, non-tender, non-distended, positive bowel sounds  Extremities: No clubbing, cyanosis; ongoing edema in all of his extremities   Skin: Patient continues to have flat, red and pruritic rash across his abdomen along his chest and on his upper thighs  It has not improved since yesterday         Labs, Imaging, & Other studies:   All pertinent labs and imaging studies were personally reviewed  Results from last 7 days   Lab Units 02/18/19  0909 02/17/19  0704   WBC Thousand/uL 10 54* 9 42   HEMOGLOBIN g/dL 7 0* 7 5*   PLATELETS Thousands/uL 238 239     Results from last 7 days   Lab Units 02/15/19  1358   POTASSIUM mmol/L 4 2   CHLORIDE mmol/L 93*   CO2 mmol/L 25   BUN mg/dL 93*   CREATININE mg/dL 7 14*   EGFR ml/min/1 73sq m 8   CALCIUM mg/dL 8 2*   AST U/L 21   ALT U/L 7*   ALK PHOS U/L 72

## 2019-02-19 NOTE — OCCUPATIONAL THERAPY NOTE
OccupationalTherapy Progress Note     Patient Name: Delgado Amador  RTEIB'K Date: 2/19/2019  Problem List  Patient Active Problem List   Diagnosis    Aortic stenosis, mild    Essential hypertension    Hyperlipidemia    Left bundle branch block    Increased BMI    Murmur    Osteoarthritis of both knees    Pericardial disease    Sciatica    Age-related cataract of right eye    Venous insufficiency    Bilateral leg edema    Stress-induced cardiomyopathy    Acute respiratory failure with hypoxia ([facility] [address] )    History of aortic valve replacement with bioprosthetic valve    Atrial fibrillation (HCC)    Staphylococcus aureus bacteremia    Transaminitis    Anemia    Leukocytosis    Cerebrovascular accident ([facility] [address] [facility]    PATRICK (acute kidney injury) ([facility] [address] [facility]    Hypervolemia    Acute systolic CH[facility]congestive heart failure) ([facility])    Toxic metabolic encephalopathy    Skin rash    Chronic kidney disease-mineral and bone disorder    Hyponatremia         02/19/19 1342   Restrictions/Precautions   Weight Bearing Precautions Per Order No   Other Precautions Telemetry; Fall Risk;Pain;Multiple lines;O2   General   Response to Previous Treatment Patient with no complaints from previous session   Lifestyle   Autonomy I ADLS, IADLS, transfers and functional mobility PTA   Reciprocal Relationships Pt lives w/ spouse and mother in law   Service to Others Pt works full time as a     Intrinsic Gratification pt likes trains   Pain Assessment   Pain Assessment 0-10   Pain Score 9   Pain Location Leg   Pain Orientation Bilateral   Hospital Pain Intervention(s) Repositioned; Emotional support   Response to Interventions worse upon standing   ADL   Where Assessed Supine, bed   Grooming Assistance 5  Supervision/Setup   Grooming Deficit Wash/dry hands; Wash/dry face   LB Dressing Assistance 2  Maximal Assistance   LB Dressing Deficit Don/doff R sock; Don/doff L sock   Bed Mobility   Supine to Sit 2  Maximal assistance Additional items Assist x 3; Increased time required;LE management;Verbal cues   Sit to Supine 2  Maximal assistance   Additional items Assist x 3; Increased time required;Verbal cues   Transfers   Sit to Stand 1  Dependent   Additional items Assist x 3; Increased time required   Stand to Sit 1  Dependent   Additional items Assist x 3; Increased time required   Functional Mobility   Additional Comments not appropriate at this time   Therapeutic Exercise - ROM   UE-ROM Yes   ROM- Right Upper Extremities   R Shoulder AROM   R Elbow AROM   R Wrist AROM   R Hand AROM   ROM - Left Upper Extremities    L Shoulder AROM   L Elbow AROM   L Wrist AAROM   L Hand AAROM   Cognition   Overall Cognitive Status Impaired   Arousal/Participation Cooperative   Attention Attends with cues to redirect   Orientation Level Oriented to person;Oriented to place; Disoriented to time;Disoriented to situation   Memory Decreased recall of precautions;Decreased recall of recent events;Decreased recall of biographical information  (Unable to recall home phone)   Following Commands Follows one step commands with increased time or repetition   Comments Pt required cues for redirect   Additional Activities   Additional Activities Other (Comment)  (Sat EOB w/ Min A for ~9 minutes)   Activity Tolerance   Activity Tolerance Patient limited by fatigue   Medical Staff Made Aware PT Sophia present, restorative Demetrius Leroy present, restorative kelly White   Assessment   Assessment Patient participated in Skilled OT session this date with interventions consisting of ADL re training with the use of correct body mechnaics, safety awareness and fall prevention techniques, therapeutic exercise to: increase functional use of BUEs, increase BUE muscle strength ,  therapeutic activities to: increase activity tolerance, increase postural control, increase trunk control and increase OOB/ sitting tolerance    Patient agreeable to OT treatment session, upon arrival patient was found supine in bed  In comparison to previous session, patient with improvements in ability to maintain seated position EOB w/ Min A*   Patient requiring frequent re direction, verbal cues for safety, verbal cues for correct technique and verbal cues for pacing thru activity steps  Patient continues to be functioning below baseline level, occupational performance remains limited secondary to factors listed above and increased risk for falls and injury  From OT standpoint, recommendation at time of d/c would be Short Term Rehab  Patient to benefit from continued Occupational Therapy treatment while in the hospital to address deficits as defined above and maximize level of functional independence with ADLs and functional mobility  Plan   Treatment Interventions ADL retraining;Functional transfer training; Endurance training;Cognitive reorientation; Compensatory technique education; Energy conservation; Activityengagement   Goal Expiration Date 02/21/19   Treatment Day 4   OT Frequency 3-5x/wk   Recommendation   OT Discharge Recommendation Short Term Rehab   OT - OK to Discharge Yes   Barthel Index   Feeding 10   Bathing 0   Grooming Score 5   Dressing Score 0   Bladder Score 0   Bowels Score 10   Toilet Use Score 0   Transfers (Bed/Chair) Score 5   Mobility (Level Surface) Score 0   Stairs Score 0   Barthel Index Score 30   Modified [place] Scale   Modified [place] Scale 5     Melyssa Rasheed MS, OTR/L

## 2019-02-19 NOTE — PLAN OF CARE
Problem: Potential for Falls  Goal: Patient will remain free of falls  Description  INTERVENTIONS:  - Assess patient frequently for physical needs  -  Identify cognitive and physical deficits and behaviors that affect risk of falls  -  [place] fall precautions as indicated by assessment   - Educate patient/family on patient safety including physical limitations  - Instruct patient to call for assistance with activity based on assessment  - Modify environment to reduce risk of injury  - Consider OT/PT consult to assist with strengthening/mobility    Outcome: Progressing     Problem: Prexisting or High Potential for Compromised Skin Integrity  Goal: Skin integrity is maintained or improved  Description  INTERVENTIONS:  - Identify patients at risk for skin breakdown  - Assess and monitor skin integrity  - Assess and monitor nutrition and hydration status  - Monitor labs (i e  albumin)  - Assess for incontinence   - Turn and reposition patient  - Assist with mobility/ambulation  - Relieve pressure over bony prominences  - Avoid friction and shearing  - Provide appropriate hygiene as needed including keeping skin clean and dry  - Evaluate need for skin moisturizer/barrier cream  - Collaborate with interdisciplinary team (i e  Nutrition, Rehabilitation, etc )   - Patient/family teaching   Outcome: Progressing     Problem: Nutrition/Hydration-ADULT  Goal: Nutrient/Hydration intake appropriate for improving, restoring or maintaining nutritional needs  Description  Monitor and assess patient's nutrition/hydration status for malnutrition (ex- brittle hair, bruises, dry skin, pale skin and conjunctiva, muscle wasting, smooth red tongue, and disorientation)  Collaborate with interdisciplinary team and initiate plan and interventions as ordered  Monitor patient's weight and dietary intake as ordered or per policy  Utilize nutrition screening tool and intervene per policy   Determine patient's food preferences and provide high-protein, high-caloric foods as appropriate  INTERVENTIONS:  - Monitor oral intake, urinary output, labs, and treatment plans  - Assess nutrition and hydration status and recommend course of action  - Evaluate amount of meals eaten  - Assist patient with eating if necessary   - Allow adequate time for meals  - Recommend/ encourage appropriate diets, oral nutritional supplements, and vitamin/mineral supplements  - Order, calculate, and assess calorie counts as needed  - Recommend, monitor, and adjust tube feedings and TPN/PPN based on assessed needs  - Assess need for intravenous fluids  - Provide specific nutrition/hydration education as appropriate  - Include patient/family/caregiver in decisions related to nutrition   Outcome: Progressing     Problem: CARDIOVASCULAR - ADULT  Goal: Maintains optimal cardiac output and hemodynamic stability  Description  INTERVENTIONS:  - Monitor I/O, vital signs and rhythm  - Monitor for S/S and trends of decreased cardiac output i e  bleeding, hypotension  - Administer and titrate ordered vasoactive medications to optimize hemodynamic stability  - Assess quality of pulses, skin color and temperature  - Assess for signs of decreased coronary artery perfusion - ex   Angina  - Instruct patient to report change in severity of symptoms   Outcome: Progressing  Goal: Absence of cardiac dysrhythmias or at baseline rhythm  Description  INTERVENTIONS:  - Continuous cardiac monitoring, monitor vital signs, obtain 12 lead EKG if indicated  - Administer antiarrhythmic and heart rate control medications as ordered  - Monitor electrolytes and administer replacement therapy as ordered   Outcome: Progressing     Problem: METABOLIC, FLUID AND ELECTROLYTES - ADULT  Goal: Electrolytes maintained within normal limits  Description  INTERVENTIONS:  - Monitor labs and assess patient for signs and symptoms of electrolyte imbalances  - Administer electrolyte replacement as ordered  - Monitor response to electrolyte replacements, including repeat lab results as appropriate  - Instruct patient on fluid and nutrition as appropriate   Outcome: Progressing  Goal: Fluid balance maintained  Description  INTERVENTIONS:  - Monitor labs and assess for signs and symptoms of volume excess or deficit  - Monitor I/O and WT  - Instruct patient on fluid and nutrition as appropriate   Outcome: Progressing     Problem: PAIN - ADULT  Goal: Verbalizes/displays adequate comfort level or baseline comfort level  Description  Interventions:  - Encourage patient to monitor pain and request assistance  - Assess pain using appropriate pain scale  - Administer analgesics based on type and severity of pain and evaluate response  - Implement non-pharmacological measures as appropriate and evaluate response  - Consider cultural and social influences on pain and pain management  - Notify physician/advanced practitioner if interventions unsuccessful or patient reports new pain   Outcome: Progressing     Problem: INFECTION - ADULT  Goal: Absence or prevention of progression during hospitalization  Description  INTERVENTIONS:  - Assess and monitor for signs and symptoms of infection  - Monitor lab/diagnostic results  - Monitor all insertion sites, i e  indwelling lines, tubes, and drains  - Monitor endotracheal (as able) and nasal secretions for changes in amount and color  - [place] appropriate cooling/warming therapies per order  - Administer medications as ordered  - Instruct and encourage patient and family to use good hand hygiene technique  - Identify and instruct in appropriate isolation precautions for identified infection/condition    Outcome: Progressing     Problem: SAFETY ADULT  Goal: Patient will remain free of falls  Description  INTERVENTIONS:  - Assess patient frequently for physical needs  -  Identify cognitive and physical deficits and behaviors that affect risk of falls    -  [place] fall precautions as indicated by assessment   - Educate patient/family on patient safety including physical limitations  - Instruct patient to call for assistance with activity based on assessment  - Modify environment to reduce risk of injury  - Consider OT/PT consult to assist with strengthening/mobility    Outcome: Progressing  Goal: Maintain or return to baseline ADL function  Description  INTERVENTIONS:  -  Assess patient's ability to carry out ADLs; assess patient's baseline for ADL function and identify physical deficits which impact ability to perform ADLs (bathing, care of mouth/teeth, toileting, grooming, dressing, etc )  - Assess/evaluate cause of self-care deficits   - Assess range of motion  - Assess patient's mobility; develop plan if impaired  - Assess patient's need for assistive devices and provide as appropriate  - Encourage maximum independence but intervene and supervise when necessary  ¯ Involve family in performance of ADLs  ¯ Assess for home care needs following discharge   ¯ Request OT consult to assist with ADL evaluation and planning for discharge  ¯ Provide patient education as appropriate    Outcome: Progressing  Goal: Maintain or return mobility status to optimal level  Description  INTERVENTIONS:  - Assess patient's baseline mobility status (ambulation, transfers, stairs, etc )    - Identify cognitive and physical deficits and behaviors that affect mobility  - Identify mobility aids required to assist with transfers and/or ambulation (gait belt, sit-to-stand, lift, walker, cane, etc )  - [place] fall precautions as indicated by assessment  - Record patient progress and toleration of activity level on Mobility SBAR; progress patient to next Phase/Stage  - Instruct patient to call for assistance with activity based on assessment  - Request Rehabilitation consult to assist with strengthening/weightbearing, etc     Outcome: Progressing     Problem: DISCHARGE PLANNING  Goal: Discharge to home or other facility with appropriate resources  Description  INTERVENTIONS:  - Identify barriers to discharge w/patient and caregiver  - Arrange for needed discharge resources and transportation as appropriate  - Identify discharge learning needs (meds, wound care, etc )  - Arrange for interpretive services to assist at discharge as needed  - Refer to Case Management Department for coordinating discharge planning if the patient needs post-hospital services based on physician/advanced practitioner order or complex needs related to functional status, cognitive ability, or social support system   Outcome: Progressing     Problem: Knowledge Deficit  Goal: Patient/family/caregiver demonstrates understanding of disease process, treatment plan, medications, and discharge instructions  Description  Complete learning assessment and assess knowledge base    Interventions:  - Provide teaching at level of understanding  - Provide teaching via preferred learning methods   Outcome: Progressing     Problem: GENITOURINARY - ADULT  Goal: Maintains or returns to baseline urinary function  Description  INTERVENTIONS:  - Assess urinary function  - Encourage oral fluids to ensure adequate hydration  - Administer IV fluids as ordered to ensure adequate hydration  - Administer ordered medications as needed  - Offer frequent toileting  - Follow urinary retention protocol if ordered   Outcome: Progressing  Goal: Absence of urinary retention  Description  INTERVENTIONS:  - Assess patient?s ability to void and empty bladder  - Monitor I/O  - Bladder scan as needed  - Discuss with physician/AP medications to alleviate retention as needed  - Discuss catheterization for long term situations as appropriate   Outcome: Progressing     Problem: DISCHARGE PLANNING - CARE MANAGEMENT  Goal: Discharge to post-acute care or home with appropriate resources  Description  INTERVENTIONS:  - Conduct assessment to determine patient/family and health care team treatment goals, and need for post-acute services based on payer coverage, community resources, and patient preferences, and barriers to discharge  - Address psychosocial, clinical, and financial barriers to discharge as identified in assessment in conjunction with the patient/family and health care team  - Arrange appropriate level of post-acute services according to patient's   needs and preference and payer coverage in collaboration with the physician and health care team  - Communicate with and update the patient/family, physician, and health care team regarding progress on the discharge plan  - Arrange appropriate transportation to post-acute venues  - Pt to d/c with appropriate resources when medically stable     Outcome: Progressing

## 2019-02-19 NOTE — PLAN OF CARE
Problem: PHYSICAL THERAPY ADULT  Goal: Performs mobility at highest level of function for planned discharge setting  See evaluation for individualized goals  Description  Treatment/Interventions: LE strengthening/ROM, Therapeutic exercise, Endurance training, Patient/family training, Cognitive reorientation, Equipment eval/education, Bed mobility, Spoke to advanced practitioner, Spoke to case management, Spoke to nursing  Equipment Recommended:  (TBD- may benefit from [facility] bed)       See flowsheet documentation for full assessment, interventions and recommendations     Outcome: Progressing

## 2019-02-19 NOTE — PROGRESS NOTES
General Cardiology   Progress Note -  Team One   Veronica Burkett 60 y o  male MRN: [id number]    Unit/Bed#: [facility] 507-01 Encounter: [id number]    Assessment    1  New cardiomyopathy CHON 1/25/19 showed EF 40%  2  Mixed shock cardiogenic/septic  3  Atrial flutter rate controlled   4  Non MI elevated troponin peaked > 40  5  S/p bio AVR   6  PATRICK on HD creatinine 7 1 (followed by Nephrology) On HD   7  MSSA bacteremia on 6 weeks antibiotics     Plan    Follow up echocardiogram today pending   CHON 1/25/19 showed no evidence of valvular vegetation   Reviewed telemetry showed rate controlled atrial flutter   On amiodarone   Metoprolol XL increased to 100 mg PO Q 12 yesterday  Continue OAC: coumadin  INR 1 73 (2/18)  Consider Cardioversion once clinically improved  On torsemide 80 mg PO daily   Will continue to follow     Subjective  Patient has no complaints from a cardiac standpoint  He denies chest pain, SOB or palpitations  He is resting in bed  He reports mild neck pain  No fever or chills  Review of Systems   Constitution: Negative for chills and fever  Cardiovascular: Positive for leg swelling  Negative for chest pain, orthopnea and palpitations  Respiratory: Negative for shortness of breath  Musculoskeletal: Negative for falls  Gastrointestinal: Negative for nausea and vomiting  Neurological: Negative for dizziness and light-headedness  Psychiatric/Behavioral: Negative for altered mental status  Objective:   Vitals: Blood pressure 116/62, pulse 67, temperature 98 3 °F (36 8 °C), temperature source Axillary, resp  rate 18, height 5' 9" (1 753 m), weight (!) 155 kg (342 lb 6 oz), SpO2 99 %  ,       Body mass index is 50 56 kg/m²  ,     Systolic (20REY), BJI:607 , Min:98 , KEK:190     Diastolic (65EUV), YSB:36, Min:26, Max:95      Intake/Output Summary (Last 24 hours) at 2/19/2019 1044  Last data filed at 2/19/2019 0800  Gross per 24 hour   Intake 1100 ml   Output 2500 ml   Net -1400 ml Weight (last 2 days)     Date/Time   Weight    02/19/19 0531   155 (342 37)  (Abnormal)     02/18/19 0437   162 (357 59)  (Abnormal)     02/17/19 0600   167 (367 73)  (Abnormal)             Telemetry Review: Atrial flutter HR 60s     Physical Exam   Constitutional: He is oriented to person, place, and time  No distress  Neck: JVD present  Cardiovascular: Normal rate, regular rhythm and intact distal pulses  Pulmonary/Chest: Effort normal  No respiratory distress  He has no wheezes  Decreased throughout  3 L NC    Abdominal: Soft  Bowel sounds are normal    Morbidly obese    Musculoskeletal: He exhibits edema  + 3 edema bilateral L E   Neurological: He is alert and oriented to person, place, and time  Skin: Skin is warm and dry  He is not diaphoretic  Bilateral toe wounds LAURENT    Psychiatric: He has a normal mood and affect         LABORATORY RESULTS      CBC with diff:   Results from last 7 days   Lab Units 02/18/19  0909 02/17/19  0704   WBC Thousand/uL 10 54* 9 42   HEMOGLOBIN g/dL 7 0* 7 5*   HEMATOCRIT % 23 1* 25 3*   MCV fL 88 87   PLATELETS Thousands/uL 238 239   MCH pg 26 3* 25 8*   MCHC g/dL 29 9* 29 6*   RDW % 17 3* 17 2*   MPV fL 10 8 11 3   NRBC AUTO /100 WBCs 0 0       CMP:  Results from last 7 days   Lab Units 02/15/19  1358 02/13/19  0509   POTASSIUM mmol/L 4 2 4 9   CHLORIDE mmol/L 93* 92*   CO2 mmol/L 25 22   BUN mg/dL 93* 101*   CREATININE mg/dL 7 14* 7 74*   CALCIUM mg/dL 8 2* 7 6*   AST U/L 21  --    ALT U/L 7*  --    ALK PHOS U/L 72  --    EGFR ml/min/1 73sq m 8 7       BMP:  Results from last 7 days   Lab Units 02/15/19  1358 02/13/19  0509   POTASSIUM mmol/L 4 2 4 9   CHLORIDE mmol/L 93* 92*   CO2 mmol/L 25 22   BUN mg/dL 93* 101*   CREATININE mg/dL 7 14* 7 74*   CALCIUM mg/dL 8 2* 7 6*       Lab Results   Component Value Date    NTBNP 25,015 (H) 02/06/2019    NTBNP 25,548 (H) 01/23/2019     Results from last 7 days   Lab Units 02/13/19  0509   HEMOGLOBIN A1C % 6 2     Results from last 7 days   Lab Units 19  0509   TSH 3RD GENERATON uIU/mL 4 000*       Results from last 7 days   Lab Units 19  0909 19  1027 19  0443 02/15/19  1358 19  0754 19  0635 19  0509   INR  1 73* 4 27* 3 73* 3 29* 3 41* 3 47* 4 06*       Lipid Profile:   Lab Results   Component Value Date    CHOL 146 2014    CHOL 145 2014     Lab Results   Component Value Date    HDL 44 2019    HDL 41 2018    HDL 52 2017     Lab Results   Component Value Date    LDLCALC 77 2019    LDLCALC 57 2018    LDLCALC 129 (H) 2017     Lab Results   Component Value Date    TRIG 129 2019    TRIG 102 2018    TRIG 71 2017       Cardiac testing:   Results for orders placed during the hospital encounter of 19   Echo complete with contrast if indicated    Narrative [address] [facility] [address] [facility] [address], [facility]  [phone]    Transthoracic Echocardiogram  2D, Doppler, and Color Doppler    Study date:  2019    Patient: Kristina Mars  MR number: [id number]  Account number: [de-identified]  : 1960  Age: 60 years  Gender: Male  Status: Inpatient  Location: Bedside  Height: 72 in  Weight: 339 lb  BP: 89/ 54 mmHg    Indications: chest pain    Diagnoses: R07 9 - Chest pain, unspecified    Sonographer:  MADHU Rocha  Primary Physician:  Clair Olsen DO  Referring Physician:  Cristina Bai DO  Group:  [facility]  Interpreting Physician:  Rocio Robins DO    SUMMARY    PROCEDURE INFORMATION:  This was a technically difficult study despite the administration of echo contrast     LEFT VENTRICLE:  Systolic function was markedly reduced  Ejection fraction was estimated to be 30 %  There was severe diffuse hypokinesis with no obvious identifiable regional variations  Wall thickness was moderately increased  Concentric hypertrophy was present      VENTRICULAR SEPTUM:  There was dyssynergic motion  RIGHT VENTRICLE:  The ventricle was mildly dilated  Systolic function was moderately to markedly reduced  LEFT ATRIUM:  The atrium was mildly dilated  RIGHT ATRIUM:  The atrium was mildly dilated  AORTIC VALVE:  A bioprosthesis was present  It was not well visualized  Mean transvalvular gradient 13 mmHg  TRICUSPID VALVE:  There was mild regurgitation  PERICARDIUM:  A small, partially loculated pericardial effusion was identified along the left ventricular free wall  HISTORY: PRIOR HISTORY: Aortic valve prosthesis    PROCEDURE: The procedure was performed at the bedside  This was a routine study  The transthoracic approach was used  The study included complete 2D imaging, complete spectral Doppler, and color Doppler  The heart rate was 80 bpm, at the  start of the study  Intravenous contrast (Definity solution [1 3 ml Definity/8 7ml normal saline solution]) was administered  Intravenous contrast (Definity solution [1 3 ml Definity/8 7ml normal saline solution]) was administered to  opacify the left ventricle and enhance Doppler signals  Echocardiographic views were limited due to low windows and patient on mechanical ventilator  This was a technically difficult study despite the administration of echo contrast     LEFT VENTRICLE: Size was normal  Systolic function was markedly reduced  Ejection fraction was estimated to be 30 %  There was severe diffuse hypokinesis with no obvious identifiable regional variations  Wall thickness was moderately  increased  Concentric hypertrophy was present  DOPPLER: Normal sinus rhythm was absent  The study was not technically sufficient to allow evaluation of LV diastolic function  VENTRICULAR SEPTUM: There was dyssynergic motion  RIGHT VENTRICLE: The ventricle was mildly dilated  Systolic function was moderately to markedly reduced  LEFT ATRIUM: The atrium was mildly dilated      RIGHT ATRIUM: The atrium was mildly dilated  MITRAL VALVE: Not well visualized  DOPPLER: The transmitral velocity was within the normal range  There was no evidence for stenosis  There was no significant regurgitation  AORTIC VALVE: A bioprosthesis was present  It was not well visualized  Mean transvalvular gradient 13 mmHg  DOPPLER: There was no significant regurgitation  TRICUSPID VALVE: The valve structure was normal  There was normal leaflet separation  DOPPLER: The transtricuspid velocity was within the normal range  There was no evidence for stenosis  There was mild regurgitation  The tricuspid jet  envelope definition was inadequate for estimation of RV systolic pressure  PULMONIC VALVE: Leaflets exhibited normal thickness, no calcification, and normal cuspal separation  DOPPLER: The transpulmonic velocity was within the normal range  There was no significant regurgitation  PERICARDIUM: A small, partially loculated pericardial effusion was identified along the left ventricular free wall  The pericardium was normal in appearance  AORTA: The root exhibited normal size  SYSTEM MEASUREMENT TABLES    2D  %FS: 15 83 %  Ao Diam: 3 09 cm  EDV(Teich): 221 88 ml  EF(Teich): 32 54 %  ESV(Teich): 149 69 ml  IVSd: 1 59 cm  LA Diam: 5 18 cm  LVIDd: 6 58 cm  LVIDs: 5 54 cm  LVPWd: 1 43 cm  SV(Teich): 72 19 ml    CW  AV Env  Ti: 233 56 ms  AV VTI: 49 77 cm  AV Vmax: 2 64 m/s  AV Vmax: 2 67 m/s  AV Vmean: 2 13 m/s  AV maxP 95 mmHg  AV maxP 53 mmHg  AV meanP 73 mmHg    PW  LVOT Env  Ti: 215 22 ms  LVOT VTI: 11 41 cm  LVOT Vmax: 0 59 m/s  LVOT Vmax: 0 7 m/s  LVOT Vmean: 0 52 m/s  LVOT maxP 78 mmHg  LVOT maxP mmHg  LVOT meanP 24 mmHg  MV E Deep: 1 01 m/s    [employer] Accredited Echocardiography Laboratory    Prepared and electronically signed by    Cristine Bañuelos DO  Signed 2019 10:14:55       Results for orders placed during the hospital encounter of 19   CHON    Narrative [facility], [address] [place] [address]  [phone]    Transesophageal Echocardiogram  2D, Doppler, and Color Doppler    Study date:  2019    Patient: Kita Ormond  MR number: [id number]  Account number: [de-identified]  : 1960  Age: 60 years  Gender: Male  Status: Inpatient  Location: Bedside  Height: 69 in  Weight: 319 lb  BP: 101/ 54 mmHg    Indications: Bacteremia  Diagnoses: R78 81 - Bacteremia    Sonographer:  Mary Ledbetter, [facility]  Interpreting Physician:  Jami Navarro DO  Primary Physician:  Igor Andersen DO  Referring Physician:  Alondra Hull DO  Group:  [id number]  Cardiology Fellow:  Roy Apgar, MD    IMPRESSIONS:  There was no echocardiographic evidence for valvular vegetation  SUMMARY    LEFT VENTRICLE:  Systolic function was moderately reduced  Ejection fraction was estimated to be 40 %  There was moderate diffuse hypokinesis  Wall thickness was mildly increased  There was mild concentric hypertrophy  RIGHT VENTRICLE:  The size was normal   Systolic function was mildly reduced  LEFT ATRIUM:  The atrium was mildly dilated  ATRIAL SEPTUM:  There was a small patent foramen ovale with left to right shunt identified by color Doppler  RIGHT ATRIUM:  The atrium was mildly dilated  MITRAL VALVE:  There was trace regurgitation  There was no echocardiographic evidence of vegetation  AORTIC VALVE:  A bioprosthesis was present  It exhibited normal function  There was no echocardiographic evidence of vegetation  TRICUSPID VALVE:  There was mild regurgitation  There was no echocardiographic evidence of vegetation  HISTORY: PRIOR HISTORY: Aortic valve replacement, NSTEMI, Cardiomyopathy, Acute kidney injury  PROCEDURE: The procedure was performed at the bedside  This was a routine study   The risks and alternatives of the procedure were explained to the patient's next of kin and informed consent was obtained  The transesophageal approach was  used  The study included complete 2D imaging, complete spectral Doppler, and color Doppler  The heart rate was 113 bpm, at the start of the study  An adult omniplane probe was inserted by the cardiology fellow under direct supervision of  the attending cardiologist  Intubated with ease  One intubation attempt(s)  There was no blood detected on the probe  Image quality was adequate  There were no complications during the procedure  MEDICATIONS: Sedation administered by  bedside nurse  LEFT VENTRICLE: Size was normal  Systolic function was moderately reduced  Ejection fraction was estimated to be 40 %  There was moderate diffuse hypokinesis  Wall thickness was mildly increased  There was mild concentric hypertrophy  DOPPLER: The study was not technically sufficient to allow evaluation of LV diastolic function  RIGHT VENTRICLE: The size was normal  Systolic function was mildly reduced  Wall thickness was normal     LEFT ATRIUM: The atrium was mildly dilated  No thrombus was identified  APPENDAGE: The appendage was small  No thrombus was identified  DOPPLER: The function was normal (normal emptying velocity)  ATRIAL SEPTUM: There was a small patent foramen ovale with left to right shunt identified by color Doppler  RIGHT ATRIUM: The atrium was mildly dilated  No thrombus was identified  MITRAL VALVE: Valve structure was normal  There was normal leaflet separation  There was no echocardiographic evidence of vegetation  DOPPLER: There was trace regurgitation  AORTIC VALVE: A bioprosthesis was present  It exhibited normal function  There was no echocardiographic evidence of vegetation  TRICUSPID VALVE: The valve structure was normal  There was normal leaflet separation  There was no echocardiographic evidence of vegetation  DOPPLER: There was mild regurgitation      PULMONIC VALVE: Leaflets exhibited normal thickness, no calcification, and normal cuspal separation  There was no echocardiographic evidence of vegetation  DOPPLER: There was no significant regurgitation  PERICARDIUM: There was no pericardial effusion seen in the images obtained  AORTA: The root exhibited normal size  There was no atheroma  There was no evidence for dissection  There was no evidence for aneurysm      [address] Accredited Echocardiography Laboratory    Prepared and electronically signed by    Kaycee Nash DO  Signed 25-Jan-2019 14:01:14       Meds/Allergies   all current active meds have been reviewed and current meds:   Current Facility-Administered Medications   Medication Dose Route Frequency    acetaminophen (TYLENOL) tablet 650 mg  650 mg Oral Q6H PRN    amiodarone tablet 200 mg  200 mg Oral Daily With Breakfast    aspirin (ECOTRIN LOW STRENGTH) EC tablet 81 mg  81 mg Oral Daily    atorvastatin (LIPITOR) tablet 40 mg  40 mg Oral Daily With Dinner    bisacodyl (DULCOLAX) rectal suppository 10 mg  10 mg Rectal Daily PRN    calcium acetate (PHOSLO) capsule 1,334 mg  1,334 mg Oral TID With Meals    diphenhydrAMINE (BENADRYL) tablet 25 mg  25 mg Oral Q6H PRN    guaiFENesin (MUCINEX) 12 hr tablet 600 mg  600 mg Oral Q12H [address]    hydrocortisone 1 % cream   Topical BID    HYDROmorphone (DILAUDID) injection 1 mg  1 mg Intravenous Q3H PRN    iron sucrose (VENOFER) 200 mg in sodium chloride 0 9 % 100 mL IVPB  200 mg Intravenous Once per day on Mon Wed Fri    metoprolol (LOPRESSOR) injection 5 mg  5 mg Intravenous Q6H PRN    metoprolol succinate (TOPROL-XL) 24 hr tablet 100 mg  100 mg Oral Q12H    nystatin (MYCOSTATIN) powder   Topical BID    oxyCODONE (ROXICODONE) immediate release tablet 10 mg  10 mg Oral Q4H PRN    polyethylene glycol (MIRALAX) packet 17 g  17 g Oral Daily    polyvinyl alcohol (LIQUIFILM TEARS) 1 4 % ophthalmic solution 1 drop  1 drop Both Eyes Q3H PRN    QUEtiapine (SEROquel) tablet 25 mg  25 mg Oral HS    senna-docusate sodium (SENOKOT S) 8 6-50 mg per tablet 1 tablet  1 tablet Oral BID    torsemide (DEMADEX) tablet 80 mg  80 mg Oral Daily    vancomycin (VANCOCIN) IVPB (premix) 1,000 mg  10 mg/kg (Adjusted) Intravenous After Dialysis    warfarin (COUMADIN) tablet 2 mg  2 mg Oral Daily (warfarin)     Medications Prior to Admission   Medication    amLODIPine (NORVASC) 5 mg tablet    ascorbic acid (VITAMIN C) 500 MG tablet    aspirin (ECOTRIN) 325 mg EC tablet    fluticasone (FLONASE) 50 mcg/act nasal spray    loratadine (CLARITIN) 10 mg tablet    metoprolol tartrate (LOPRESSOR) 100 mg tablet    potassium chloride (K-DUR,KLOR-CON) 20 mEq tablet    pravastatin (PRAVACHOL) 40 mg tablet    torsemide (DEMADEX) 20 mg tablet       Counseling / Coordination of Care  Total floor / unit time spent today 20 minutes  Greater than 50% of total time was spent with the patient and / or family counseling and / or coordination of care  ** Please Note: Dragon 360 Dictation voice to text software may have been used in the creation of this document   **

## 2019-02-19 NOTE — RESTORATIVE TECHNICIAN NOTE
Restorative Specialist Mobility Note       Activity: Bedrest, Dangle, Stand at bedside     Assistive Device: Other (Comment)(HHA x 2-3)     Ambulation Response: Tolerated fairly well  Repositioned: Supine                          Assisted therapy during session  For all/additional information please see therapy note(s)        Mary Ellen Thomas Restorative Technician, BS

## 2019-02-19 NOTE — PROGRESS NOTES
Vancomycin Assessment    Wes Stallings is a 60 y o  male who is currently receiving vancomycin 2000mg IV Q24H for MRSA suspected, bacteremia     Relevant clinical data and objective history reviewed:  Creatinine   Date Value Ref Range Status   02/15/2019 7 14 (H) 0 60 - 1 30 mg/dL Final     Comment:     Standardized to IDMS reference method   02/13/2019 7 74 (H) 0 60 - 1 30 mg/dL Final     Comment:     Standardized to IDMS reference method   02/12/2019 6 64 (H) 0 60 - 1 30 mg/dL Final     Comment:     Standardized to IDMS reference method   04/09/2015 0 67 0 60 - 1 30 mg/dL Final     Comment:     Standardized to IDMS reference method   11/15/2014 0 70 0 60 - 1 30 mg/dL Final     Comment:     Standardized to IDMS reference method   11/02/2014 0 86 0 60 - 1 30 mg/dL Final     Comment:     Standardized to IDMS reference method     /61   Pulse 95   Temp 98 3 °F (36 8 °C) (Oral)   Resp 18   Ht 5' 9" (1 753 m)   Wt (!) 162 kg (357 lb 9 4 oz)   SpO2 96%   BMI 52 81 kg/m²   I/O last 3 completed shifts: In: 8848 [P O :967; I V :500]  Out: 2500 [Other:2500]  Lab Results   Component Value Date/Time    BUN 93 (H) 02/15/2019 01:58 PM    BUN 17 04/09/2015 11:41 AM    WBC 10 54 (H) 02/18/2019 09:09 AM    WBC 6 71 11/02/2014 05:56 AM    HGB 7 0 (L) 02/18/2019 09:09 AM    HGB 11 9 (L) 11/02/2014 05:56 AM    HCT 23 1 (L) 02/18/2019 09:09 AM    HCT 39 3 11/02/2014 05:56 AM    MCV 88 02/18/2019 09:09 AM    MCV 81 (L) 11/02/2014 05:56 AM     02/18/2019 09:09 AM     11/02/2014 05:56 AM     Temp Readings from Last 3 Encounters:   02/18/19 98 3 °F (36 8 °C) (Oral)   01/24/19 99 8 °F (37 7 °C) (Tympanic)   06/23/15 97 8 °F (36 6 °C)     Vancomycin Days of Therapy:1     Assessment/Plan  The patient is currently on vancomycin utilizing pulse dosing based on adjusted body weight (due to obesity)  Baseline risks associated with therapy include: pre-existing renal impairment    The patient is currently receiving 2000mg IV Q24H and after clinical evaluation will be changed to a 2000 mg loading dose and then 10 mg/kg after dialysis  Pharmacy will also follow closely for s/sx of nephrotoxicity, infusion reactions and appropriateness of therapy  BMP and CBC will be ordered per protocol  Plan for random level as  prior to the 3rd  dose with morning labs 2/21 pre-HD  Due to infection severity, will target a trough of 15-20 (appropriate for most indications)   Pharmacy will continue to follow the patient?s culture results and clinical progress daily      Milana Marcelino, Pharmacist

## 2019-02-20 ENCOUNTER — APPOINTMENT (INPATIENT)
Dept: DIALYSIS | Facility: HOSPITAL | Age: 59
DRG: 871 | End: 2019-02-20
Payer: COMMERCIAL

## 2019-02-20 LAB
ERYTHROCYTE [DISTWIDTH] IN BLOOD BY AUTOMATED COUNT: 16.9 % (ref 11.6–15.1)
HCT VFR BLD AUTO: 24.1 % (ref 36.5–49.3)
HGB BLD-MCNC: 7.4 G/DL (ref 12–17)
INR PPP: 1.49 (ref 0.86–1.17)
MCH RBC QN AUTO: 26.7 PG (ref 26.8–34.3)
MCHC RBC AUTO-ENTMCNC: 30.7 G/DL (ref 31.4–37.4)
MCV RBC AUTO: 87 FL (ref 82–98)
PLATELET # BLD AUTO: 226 THOUSANDS/UL (ref 149–390)
PMV BLD AUTO: 10 FL (ref 8.9–12.7)
PROTHROMBIN TIME: 18.1 SECONDS (ref 11.8–14.2)
RBC # BLD AUTO: 2.77 MILLION/UL (ref 3.88–5.62)
WBC # BLD AUTO: 11.91 THOUSAND/UL (ref 4.31–10.16)

## 2019-02-20 PROCEDURE — 85610 PROTHROMBIN TIME: CPT | Performed by: INTERNAL MEDICINE

## 2019-02-20 PROCEDURE — 97530 THERAPEUTIC ACTIVITIES: CPT

## 2019-02-20 PROCEDURE — 99232 SBSQ HOSP IP/OBS MODERATE 35: CPT | Performed by: INTERNAL MEDICINE

## 2019-02-20 PROCEDURE — 85027 COMPLETE CBC AUTOMATED: CPT | Performed by: INTERNAL MEDICINE

## 2019-02-20 PROCEDURE — 94760 N-INVAS EAR/PLS OXIMETRY 1: CPT

## 2019-02-20 PROCEDURE — 94660 CPAP INITIATION&MGMT: CPT

## 2019-02-20 PROCEDURE — 90935 HEMODIALYSIS ONE EVALUATION: CPT | Performed by: INTERNAL MEDICINE

## 2019-02-20 RX ADMIN — NYSTATIN: 100000 POWDER TOPICAL at 17:20

## 2019-02-20 RX ADMIN — DIPHENHYDRAMINE HCL 25 MG: 25 TABLET ORAL at 22:06

## 2019-02-20 RX ADMIN — ATORVASTATIN CALCIUM 40 MG: 40 TABLET, FILM COATED ORAL at 16:03

## 2019-02-20 RX ADMIN — HYDROCORTISONE: 1 CREAM TOPICAL at 17:20

## 2019-02-20 RX ADMIN — HYDROCORTISONE: 1 CREAM TOPICAL at 11:52

## 2019-02-20 RX ADMIN — OXYCODONE HYDROCHLORIDE 10 MG: 10 TABLET ORAL at 09:16

## 2019-02-20 RX ADMIN — GUAIFENESIN 600 MG: 600 TABLET, EXTENDED RELEASE ORAL at 20:06

## 2019-02-20 RX ADMIN — QUETIAPINE FUMARATE 25 MG: 25 TABLET ORAL at 22:06

## 2019-02-20 RX ADMIN — IRON SUCROSE 100 MG: 20 INJECTION, SOLUTION INTRAVENOUS at 09:35

## 2019-02-20 RX ADMIN — METOPROLOL SUCCINATE 100 MG: 100 TABLET, EXTENDED RELEASE ORAL at 11:50

## 2019-02-20 RX ADMIN — ACETAMINOPHEN 650 MG: 325 TABLET, FILM COATED ORAL at 20:07

## 2019-02-20 RX ADMIN — ACETAMINOPHEN 650 MG: 325 TABLET, FILM COATED ORAL at 12:05

## 2019-02-20 RX ADMIN — NYSTATIN: 100000 POWDER TOPICAL at 11:52

## 2019-02-20 RX ADMIN — CALCIUM ACETATE 1334 MG: 667 CAPSULE ORAL at 16:03

## 2019-02-20 RX ADMIN — METOPROLOL SUCCINATE 100 MG: 100 TABLET, EXTENDED RELEASE ORAL at 20:06

## 2019-02-20 RX ADMIN — DIPHENHYDRAMINE HCL 25 MG: 25 TABLET ORAL at 11:50

## 2019-02-20 RX ADMIN — GUAIFENESIN 600 MG: 600 TABLET, EXTENDED RELEASE ORAL at 11:51

## 2019-02-20 RX ADMIN — WARFARIN SODIUM 2 MG: 2 TABLET ORAL at 17:18

## 2019-02-20 RX ADMIN — TORSEMIDE 80 MG: 20 TABLET ORAL at 11:50

## 2019-02-20 RX ADMIN — ASPIRIN 81 MG: 81 TABLET, COATED ORAL at 11:51

## 2019-02-20 RX ADMIN — AMIODARONE HYDROCHLORIDE 200 MG: 200 TABLET ORAL at 11:51

## 2019-02-20 RX ADMIN — VANCOMYCIN HYDROCHLORIDE 1000 MG: 1 INJECTION, SOLUTION INTRAVENOUS at 10:01

## 2019-02-20 RX ADMIN — CALCIUM ACETATE 1334 MG: 667 CAPSULE ORAL at 11:55

## 2019-02-20 NOTE — HEMODIALYSIS
Patient received 2 5 hours of HD today during which catheter flow was good & prescribed BFR was maintained  The goal was decreased once for a drop in BP  Overall 1 5L of fluid were removed  Halina vail

## 2019-02-20 NOTE — PHYSICAL THERAPY NOTE
Physical Therapy Evaluation     Patient's Name: Deepak Reeves    Admitting Diagnosis  Cardiogenic shock [R57 0]    Problem List  Patient Active Problem List   Diagnosis    Aortic stenosis, mild    Essential hypertension    Hyperlipidemia    Left bundle branch block    Increased BMI    Murmur    Osteoarthritis of both knees    Pericardial disease    Sciatica    Age-related cataract of right eye    Venous insufficiency    Bilateral leg edema    Stress-induced cardiomyopathy    Acute respiratory failure with hypoxia ([facility] [address] )    History of aortic valve replacement with bioprosthetic valve    Atrial fibrillation (HCC)    Staphylococcus aureus bacteremia    Transaminitis    Anemia    Leukocytosis    Cerebrovascular accident ([facility] [address] [facility]    PATRICK (acute kidney injury) ([facility] [address] [facility]    Hypervolemia    Acute systolic [facility]congestive heart failure) ([facility])    Toxic metabolic encephalopathy    Skin rash    Chronic kidney disease-mineral and bone disorder    Hyponatremia       Past Medical History  Past Medical History:   Diagnosis Date    Allergic rhinitis     last assessed 9/12/12    Finger fracture, right     Closed fx of the middle phalanx of the right 5th finger  last assessed 1/30/14    Hemorrhoids     last assessed 2/10/14    Hyperlipidemia     Hypertension        Past Surgical History  Past Surgical History:   Procedure Laterality Date    AORTIC VALVE REPLACEMENT  07/30/2014    AVR with 25mm [facility] Ease bovine pericardial valve    CARDIAC CATHETERIZATION  07/18/2014    SLB left main-normal, circumflex-normal, ramus intermedius-normal, RCA was large and dominant giving rise to the PDA & a large posterolateral branch  No disease    last assessed 8/19/14     IR [address] PLACEMENT  2/4/2019    IR [address] PLACEMENT  2/18/2019    KNEE CARTILAGE SURGERY Left     medial meniscus tear     TESTICLE SURGERY      TONSILLECTOMY AND ADENOIDECTOMY      TOOTH EXTRACTION              02/20/19 1433   Pain Assessment   Pain Assessment No/denies pain   Pain Rating: FLACC (Rest) - Face 0   Pain Rating: FLACC (Rest) - Legs 0   Pain Rating: FLACC (Rest) - Activity 0   Pain Rating: FLACC (Rest) - Cry 0   Pain Rating: FLACC (Rest) - Consolability 0   Score: FLACC (Rest) 0   Pain Rating: FLACC (Activity) - Face 1   Pain Rating: FLACC (Activity) - Legs 1   Pain Rating: FLACC (Activity) - Activity 0   Pain Rating: FLACC (Activity) - Cry 1   Pain Rating: FLACC (Activity) - Consolability 1   Score: FLACC (Activity) 4   Restrictions/Precautions   Weight Bearing Precautions Per Order No   Other Precautions O2;Fall Risk;Pain   General   Chart Reviewed Yes   Response to Previous Treatment Patient with no complaints from previous session  Family/Caregiver Present No   Cognition   Overall Cognitive Status Impaired   Arousal/Participation Cooperative   Attention Attends with cues to redirect   Orientation Level Oriented to person;Oriented to place;Oriented to time;Disoriented to situation   Memory Decreased recall of precautions;Decreased recall of recent events;Decreased recall of biographical information   Following Commands Follows one step commands with increased time or repetition   Subjective   Subjective "I'm itchy"   Bed Mobility   Rolling R 3  Moderate assistance   Additional items Assist x 2; Increased time required   Rolling L 3  Moderate assistance   Additional items Assist x 2; Increased time required   Supine to Sit 2  Maximal assistance   Additional items Assist x 3; Increased time required;Verbal cues;LE management;HOB elevated   Sit to Supine 2  Maximal assistance   Additional items Assist x 3; Increased time required;LE management   Transfers   Sit to Stand 1  Dependent   Additional items Assist x 3; Increased time required   Stand to Sit 1  Dependent   Additional items Assist x 3; Increased time required   Additional Comments Unable to full clear surface with sit to stand transfer   Ambulation/Elevation   Gait pattern Not appropriate   Balance   Static Sitting Fair -   Dynamic Sitting Poor +   Static Standing Zero   Endurance Deficit   Endurance Deficit Yes   Endurance Deficit Description fatigue, pain   Activity Tolerance   Activity Tolerance Patient limited by fatigue   Nurse Made Aware Yes, RN cleared pt for PT    Assessment   Prognosis Fair   Problem List Decreased strength;Decreased range of motion;Decreased endurance; Impaired balance;Decreased mobility; Decreased coordination;Decreased cognition; Impaired judgement;Decreased safety awareness;Pain;Obesity   Assessment Pt was agreeable to PT treatment session  He tolerated sitting EOB for 12 minutes and 30 seconds with close S to min A x1 for stability  Pt performed dynamic reaching while seated EOB with min A for stability  Pt would continue to benefit from skilled PT to improve strength endurance, balance, and mobility to return to OF  Barriers to Discharge Inaccessible home environment;Decreased caregiver support   Goals   Patient Goals To scratch    STG Expiration Date 03/02/19   Short Term Goal #1 In 14 days pt will perform rolling and repositioning in bed w/ Nath[place] x2; pull to  long sit in bed w/ modA x2; increase LE strength 1/2 grade for increased indep w/ functional mobility; supine to sit EOB with </=mod A x2; tolerate sitting EOB >/= 20 minutes for ADLs and therex; sit pivot transfer to bedside chair using sliding board or drop arm recliner with </=max A x2; sit to stand with </= max A x2; tolerated standing for >/=2 minutes for preparation of gait   Treatment Day 6   Plan   Treatment/Interventions Functional transfer training;LE strengthening/ROM; Therapeutic exercise; Endurance training;Cognitive reorientation;Patient/family training;Equipment eval/education; Bed mobility;Gait training;Spoke to nursing;OT   Progress Slow progress, medical status limitations   PT Frequency   (3-5x/wk)   Recommendation   Recommendation Short-term skilled PT  (rehab)   PT - OK to Discharge Yes   Additional Comments When medically stable       Blanka Acevedo, PT, DPT

## 2019-02-20 NOTE — PROGRESS NOTES
NEPHROLOGY PROGRESS NOTE   Santy Wasserman 60 y o  male MRN: [id number]  Unit/Bed#: [facility] 507-01 Encounter: [id number]      ASSESSMENT & PLAN:    1  Acute kidney injury secondary to ATN will be in acute as an outpatient Tuesday Thursday Saturday  -patient seen no urine output  -has a tunneled dialysis catheter  -continue to monitor for renal recovery  -continued keep mean arterial pressure greater than 65  -UF as tolerated  -mental status slowly improving  -seen on dialysis at approximately 10:40 a m   -2 hour treatment today than will have 2 hour treatment tomorrow in will transition to Tuesday Thursday Saturday schedule  -continue torsemide 80 mg daily    2  Access: Tunneled dialysis catheter replaced by IR  -tunneled dialysis catheter remains in place and in use    3  MSSA bacteremia on 6 weeks of antibiotics  -continues on vancomycin S patient had development of a rash  -will be checking trough levels tomorrow has days or changing for Vancomycin dosing  -infectious Disease is following    4  Bioprosthetic valve  -continue to monitor cardiac function    5  Atrial fibrillation  -currently heart rates are stable at 71 on dialysis    6  Anemia of chronic kidney disease  -S not appear to have any contraindications to Epogen  -continue Venofer on dialysis  -will continue to evaluate hemoglobin consider Epogen    7  Ischemic cardiomyopathy with an EF 30%  -continue ultrafiltration as tolerated    8   CKD bone mineral disease  -continue PhosLo for hyperphosphatemia    9  atrial fibrillation  -on Coumadin for anticoagulation       SUBJECTIVE:    Patient seen today denies any chest pain shortness of breath fevers chills mental status this slightly better than yesterday weight is improving    OBJECTIVE:  Current Weight: Weight - Scale: (!) 161 kg (354 lb 15 oz)  Vitals:    02/20/19 1030   BP: (!) 126/45   Pulse: 77   Resp:    Temp:    SpO2:        Intake/Output Summary (Last 24 hours) at 2/20/2019 1036  Last data filed at 2/20/2019 0823  Gross per 24 hour   Intake 400 ml   Output 0 ml   Net 400 ml       General: conscious, cooperative, in not acute distress  Eyes: conjunctivae pink, anicteric sclerae  ENT: lips and mucous membranes moist  Neck: supple, no JVD  Chest: clear breath sounds bilateral, no crackles, ronchus or wheezings  CVS: distinct S1 & S2, normal rate, regular rhythm  Abdomen: soft, non-tender, non-distended, normoactive bowel sounds  Extremities:  2+ edema  Skin: no rash  Neuro: awake, alert, oriented        Medications:    Current Facility-Administered Medications:     acetaminophen (TYLENOL) tablet 650 mg, 650 mg, Oral, Q6H PRN, Epifanio Lawson MD, 650 mg at 02/13/19 1732    amiodarone tablet 200 mg, 200 mg, Oral, Daily With Breakfast, Riccardo Bird MD, 200 mg at 02/19/19 0825    aspirin (ECOTRIN LOW STRENGTH) EC tablet 81 mg, 81 mg, Oral, Daily, [facility], DO, 81 mg at 02/19/19 0824    atorvastatin (LIPITOR) tablet 40 mg, 40 mg, Oral, Daily With Duglas Orange, DO, 40 mg at 02/19/19 1540    bisacodyl (DULCOLAX) rectal suppository 10 mg, 10 mg, Rectal, Daily PRN, Yany Paul PA-C    calcium acetate (PHOSLO) capsule 1,334 mg, 1,334 mg, Oral, TID With Meals, Sid Quintanilla, DO, 1,334 mg at 02/19/19 1540    diphenhydrAMINE (BENADRYL) tablet 25 mg, 25 mg, Oral, Q6H PRN, [facility], DO, 25 mg at 02/19/19 1732    guaiFENesin (MUCINEX) 12 hr tablet 600 mg, 600 mg, Oral, Q12H [address], Leos Liliana Umanzor PA-C, 600 mg at 02/19/19 2102    hydrocortisone 1 % cream, , Topical, BID, [facility], DO    HYDROmorphone (DILAUDID) injection 1 mg, 1 mg, Intravenous, Q3H PRN, Lidya Shawkh, 1 mg at 02/11/19 1600    iron sucrose (VENOFER) 100 mg in sodium chloride 0 9 % 100 mL IVPB, 100 mg, Intravenous, Once per day on Mon Wed Fri, Dimitry Barboza DO, Stopped at 02/20/19 0922    metoprolol (LOPRESSOR) injection 5 mg, 5 mg, Intravenous, Q6H PRN, Savannah Oro DO, 5 mg at 02/11/19 6212   metoprolol succinate (TOPROL-XL) 24 hr tablet 100 mg, 100 mg, Oral, Q12H, Sukhjinder Perry, DO, 100 mg at 02/19/19 2102    nystatin (MYCOSTATIN) powder, , Topical, BID, Lianne Perez PA-C    oxyCODONE (ROXICODONE) immediate release tablet 10 mg, 10 mg, Oral, Q4H PRN, Carrington Pompa MD, 10 mg at 02/20/19 0916    polyethylene glycol (MIRALAX) packet 17 g, 17 g, Oral, Daily, KATHY Mathias, 17 g at 02/17/19 1036    polyvinyl alcohol (LIQUIFILM TEARS) 1 4 % ophthalmic solution 1 drop, 1 drop, Both Eyes, Q3H PRN, Etelvina Smyth PA-C, 1 drop at 02/13/19 1724    QUEtiapine (SEROquel) tablet 25 mg, 25 mg, Oral, HS, Etelvina Smyth PA-C, 25 mg at 02/19/19 2102    senna-docusate sodium (SENOKOT S) 8 6-50 mg per tablet 1 tablet, 1 tablet, Oral, BID, Mickeal Chip, DO, 1 tablet at 02/19/19 1732    torsemide (DEMADEX) tablet 80 mg, 80 mg, Oral, Daily, Irma Quintanilla DO, 80 mg at 02/19/19 1793    vancomycin (VANCOCIN) IVPB (premix) 1,000 mg, 10 mg/kg (Adjusted), Intravenous, After Dialysis, Jomar Almaguer MD, Last Rate: 200 mL/hr at 02/20/19 1001, 1,000 mg at 02/20/19 1001    warfarin (COUMADIN) tablet 2 mg, 2 mg, Oral, Daily (warfarin), Mickeal Chip, DO, 2 mg at 02/19/19 1732    Invasive Devices:      Lab Results:   Results from last 7 days   Lab Units 02/20/19  0831 02/19/19  1724 02/18/19  0909 02/17/19  0704 02/15/19  1358   WBC Thousand/uL 11 91*  --  10 54* 9 42  --    HEMOGLOBIN g/dL 7 4* 7 6* 7 0* 7 5*  --    HEMATOCRIT % 24 1* 24 6* 23 1* 25 3*  --    PLATELETS Thousands/uL 226  --  238 239  --    POTASSIUM mmol/L  --   --   --   --  4 2   CHLORIDE mmol/L  --   --   --   --  93*   CO2 mmol/L  --   --   --   --  25   BUN mg/dL  --   --   --   --  93*   CREATININE mg/dL  --   --   --   --  7 14*   CALCIUM mg/dL  --   --   --   --  8 2*   ALK PHOS U/L  --   --   --   --  72   ALT U/L  --   --   --   --  7*   AST U/L  --   --   --   --  21       Previous work up:  Please see previous notes

## 2019-02-20 NOTE — PLAN OF CARE
Problem: Potential for Falls  Goal: Patient will remain free of falls  Description  INTERVENTIONS:  - Assess patient frequently for physical needs  -  Identify cognitive and physical deficits and behaviors that affect risk of falls  -  [place] fall precautions as indicated by assessment   - Educate patient/family on patient safety including physical limitations  - Instruct patient to call for assistance with activity based on assessment  - Modify environment to reduce risk of injury  - Consider OT/PT consult to assist with strengthening/mobility    Outcome: Progressing     Problem: Prexisting or High Potential for Compromised Skin Integrity  Goal: Skin integrity is maintained or improved  Description  INTERVENTIONS:  - Identify patients at risk for skin breakdown  - Assess and monitor skin integrity  - Assess and monitor nutrition and hydration status  - Monitor labs (i e  albumin)  - Assess for incontinence   - Turn and reposition patient  - Assist with mobility/ambulation  - Relieve pressure over bony prominences  - Avoid friction and shearing  - Provide appropriate hygiene as needed including keeping skin clean and dry  - Evaluate need for skin moisturizer/barrier cream  - Collaborate with interdisciplinary team (i e  Nutrition, Rehabilitation, etc )   - Patient/family teaching   Outcome: Progressing     Problem: Nutrition/Hydration-ADULT  Goal: Nutrient/Hydration intake appropriate for improving, restoring or maintaining nutritional needs  Description  Monitor and assess patient's nutrition/hydration status for malnutrition (ex- brittle hair, bruises, dry skin, pale skin and conjunctiva, muscle wasting, smooth red tongue, and disorientation)  Collaborate with interdisciplinary team and initiate plan and interventions as ordered  Monitor patient's weight and dietary intake as ordered or per policy  Utilize nutrition screening tool and intervene per policy   Determine patient's food preferences and provide high-protein, high-caloric foods as appropriate  INTERVENTIONS:  - Monitor oral intake, urinary output, labs, and treatment plans  - Assess nutrition and hydration status and recommend course of action  - Evaluate amount of meals eaten  - Assist patient with eating if necessary   - Allow adequate time for meals  - Recommend/ encourage appropriate diets, oral nutritional supplements, and vitamin/mineral supplements  - Order, calculate, and assess calorie counts as needed  - Recommend, monitor, and adjust tube feedings and TPN/PPN based on assessed needs  - Assess need for intravenous fluids  - Provide specific nutrition/hydration education as appropriate  - Include patient/family/caregiver in decisions related to nutrition   Outcome: Progressing     Problem: CARDIOVASCULAR - ADULT  Goal: Maintains optimal cardiac output and hemodynamic stability  Description  INTERVENTIONS:  - Monitor I/O, vital signs and rhythm  - Monitor for S/S and trends of decreased cardiac output i e  bleeding, hypotension  - Administer and titrate ordered vasoactive medications to optimize hemodynamic stability  - Assess quality of pulses, skin color and temperature  - Assess for signs of decreased coronary artery perfusion - ex   Angina  - Instruct patient to report change in severity of symptoms   Outcome: Progressing  Goal: Absence of cardiac dysrhythmias or at baseline rhythm  Description  INTERVENTIONS:  - Continuous cardiac monitoring, monitor vital signs, obtain 12 lead EKG if indicated  - Administer antiarrhythmic and heart rate control medications as ordered  - Monitor electrolytes and administer replacement therapy as ordered   Outcome: Progressing     Problem: METABOLIC, FLUID AND ELECTROLYTES - ADULT  Goal: Electrolytes maintained within normal limits  Description  INTERVENTIONS:  - Monitor labs and assess patient for signs and symptoms of electrolyte imbalances  - Administer electrolyte replacement as ordered  - Monitor response to electrolyte replacements, including repeat lab results as appropriate  - Instruct patient on fluid and nutrition as appropriate   Outcome: Progressing  Goal: Fluid balance maintained  Description  INTERVENTIONS:  - Monitor labs and assess for signs and symptoms of volume excess or deficit  - Monitor I/O and WT  - Instruct patient on fluid and nutrition as appropriate   Outcome: Progressing     Problem: GENITOURINARY - ADULT  Goal: Maintains or returns to baseline urinary function  Description  INTERVENTIONS:  - Assess urinary function  - Encourage oral fluids to ensure adequate hydration  - Administer IV fluids as ordered to ensure adequate hydration  - Administer ordered medications as needed  - Offer frequent toileting  - Follow urinary retention protocol if ordered   Outcome: Progressing  Goal: Absence of urinary retention  Description  INTERVENTIONS:  - Assess patient?s ability to void and empty bladder  - Monitor I/O  - Bladder scan as needed  - Discuss with physician/AP medications to alleviate retention as needed  - Discuss catheterization for long term situations as appropriate   Outcome: Progressing     Problem: PAIN - ADULT  Goal: Verbalizes/displays adequate comfort level or baseline comfort level  Description  Interventions:  - Encourage patient to monitor pain and request assistance  - Assess pain using appropriate pain scale  - Administer analgesics based on type and severity of pain and evaluate response  - Implement non-pharmacological measures as appropriate and evaluate response  - Consider cultural and social influences on pain and pain management  - Notify physician/advanced practitioner if interventions unsuccessful or patient reports new pain   Outcome: Progressing     Problem: INFECTION - ADULT  Goal: Absence or prevention of progression during hospitalization  Description  INTERVENTIONS:  - Assess and monitor for signs and symptoms of infection  - Monitor lab/diagnostic results  - Monitor all insertion sites, i e  indwelling lines, tubes, and drains  - Monitor endotracheal (as able) and nasal secretions for changes in amount and color  - [place] appropriate cooling/warming therapies per order  - Administer medications as ordered  - Instruct and encourage patient and family to use good hand hygiene technique  - Identify and instruct in appropriate isolation precautions for identified infection/condition    Outcome: Progressing     Problem: SAFETY ADULT  Goal: Patient will remain free of falls  Description  INTERVENTIONS:  - Assess patient frequently for physical needs  -  Identify cognitive and physical deficits and behaviors that affect risk of falls  -  [place] fall precautions as indicated by assessment   - Educate patient/family on patient safety including physical limitations  - Instruct patient to call for assistance with activity based on assessment  - Modify environment to reduce risk of injury  - Consider OT/PT consult to assist with strengthening/mobility    Outcome: Progressing  Goal: Maintain or return to baseline ADL function  Description  INTERVENTIONS:  - Assess patient frequently for physical needs  -  Identify cognitive and physical deficits and behaviors that affect risk of falls    -  [place] fall precautions as indicated by assessment   - Educate patient/family on patient safety including physical limitations  - Instruct patient to call for assistance with activity based on assessment  - Modify environment to reduce risk of injury  - Consider OT/PT consult to assist with strengthening/mobility    Outcome: Progressing  Goal: Maintain or return mobility status to optimal level  Description  INTERVENTIONS:  -  Assess patient's ability to carry out ADLs; assess patient's baseline for ADL function and identify physical deficits which impact ability to perform ADLs (bathing, care of mouth/teeth, toileting, grooming, dressing, etc )  - Assess/evaluate cause of self-care deficits   - Assess range of motion  - Assess patient's mobility; develop plan if impaired  - Assess patient's need for assistive devices and provide as appropriate  - Encourage maximum independence but intervene and supervise when necessary  ¯ Involve family in performance of ADLs  ¯ Assess for home care needs following discharge   ¯ Request OT consult to assist with ADL evaluation and planning for discharge  ¯ Provide patient education as appropriate    Outcome: Progressing     Problem: DISCHARGE PLANNING  Goal: Discharge to home or other facility with appropriate resources  Description  INTERVENTIONS:  - Identify barriers to discharge w/patient and caregiver  - Arrange for needed discharge resources and transportation as appropriate  - Identify discharge learning needs (meds, wound care, etc )  - Arrange for interpretive services to assist at discharge as needed  - Refer to Case Management Department for coordinating discharge planning if the patient needs post-hospital services based on physician/advanced practitioner order or complex needs related to functional status, cognitive ability, or social support system   Outcome: Progressing     Problem: Knowledge Deficit  Goal: Patient/family/caregiver demonstrates understanding of disease process, treatment plan, medications, and discharge instructions  Description  Complete learning assessment and assess knowledge base    Interventions:  - Provide teaching at level of understanding  - Provide teaching via preferred learning methods   Outcome: Progressing     Problem: DISCHARGE PLANNING - CARE MANAGEMENT  Goal: Discharge to post-acute care or home with appropriate resources  Description  INTERVENTIONS:  - Conduct assessment to determine patient/family and health care team treatment goals, and need for post-acute services based on payer coverage, community resources, and patient preferences, and barriers to discharge  - Address psychosocial, clinical, and financial barriers to discharge as identified in assessment in conjunction with the patient/family and health care team  - Arrange appropriate level of post-acute services according to patient's   needs and preference and payer coverage in collaboration with the physician and health care team  - Communicate with and update the patient/family, physician, and health care team regarding progress on the discharge plan  - Arrange appropriate transportation to post-acute venues  - Pt to d/c with appropriate resources when medically stable     Outcome: Progressing

## 2019-02-20 NOTE — PLAN OF CARE
Problem: PHYSICAL THERAPY ADULT  Goal: Performs mobility at highest level of function for planned discharge setting  See evaluation for individualized goals  Description  Treatment/Interventions: LE strengthening/ROM, Therapeutic exercise, Endurance training, Patient/family training, Cognitive reorientation, Equipment eval/education, Bed mobility, Spoke to advanced practitioner, Spoke to case management, Spoke to nursing  Equipment Recommended:  (TBD- may benefit from [facility])       See flowsheet documentation for full assessment, interventions and recommendations  Note:   Prognosis: Fair  Problem List: Decreased strength, Decreased range of motion, Decreased endurance, Impaired balance, Decreased mobility, Decreased coordination, Decreased cognition, Impaired judgement, Decreased safety awareness, Pain, Obesity  Assessment: Pt was agreeable to PT treatment session  He tolerated sitting EOB for 12 minutes and 30 seconds with close S to min A x1 for stability  Pt performed dynamic reaching while seated EOB with min A for stability  Pt would continue to benefit from skilled PT to improve strength endurance, balance, and mobility to return to OF  Barriers to Discharge: Inaccessible home environment, Decreased caregiver support     Recommendation: Short-term skilled PT(rehab)     PT - OK to Discharge: Yes    See flowsheet documentation for full assessment

## 2019-02-20 NOTE — PROGRESS NOTES
General Cardiology   Progress Note -  [employer] One   Wes Stallings 60 y o  male MRN: [id number]    Unit/Bed#: [facility] 507-01 Encounter: [id number]    Assessment     1  New cardiomyopathy CHON 1/25/19 showed EF 40% now improved to 55% on echo 2/19/19  2  Mixed shock cardiogenic/septic  3  Atrial flutter rate controlled on telemetry   4  Non MI elevated troponin peaked > 40  5  S/p bio AVR   6  PATRICK on HD followed by Nephrology On HD   7  MSSA bacteremia on 6 weeks antibiotics      Plan     Follow up echocardiogram reviewed showing EF now improved to 55%  CHON 1/25/19 showed no evidence of valvular vegetation   Reviewed telemetry showed rate controlled atrial flutter   On amiodarone and Metoprolol  mg PO Q 12   Continue OAC: coumadin  INR 1 73 (2/18)  Consider Cardioversion once clinically improved  On torsemide 80 mg PO daily   Will continue to follow     Subjective  Patient currently in HD unit  He offers no cardiac complaints  No chest pain, palpitations or SOB  He reports breathing feels better  Review of Systems   Constitution: Negative for chills and fever  Cardiovascular: Positive for leg swelling  Negative for chest pain and orthopnea  Respiratory: Negative for shortness of breath  Musculoskeletal:        Leg pain bilaterally    Gastrointestinal: Negative for nausea and vomiting  Neurological: Negative for dizziness and light-headedness  Psychiatric/Behavioral: Negative for altered mental status  Objective:   Vitals: Blood pressure 132/72, pulse 69, temperature 97 6 °F (36 4 °C), temperature source Oral, resp  rate 18, height 5' 9" (1 753 m), weight (!) 161 kg (354 lb 15 oz), SpO2 94 %  ,       Body mass index is 52 42 kg/m²  ,     Systolic (28IBQ), RGS:785 , Min:116 , GDS:885     Diastolic (42TGR), SQD:11, Min:55, Max:76      Intake/Output Summary (Last 24 hours) at 2/20/2019 0828  Last data filed at 2/20/2019 0823  Gross per 24 hour   Intake 400 ml   Output 0 ml   Net 400 ml     Weight (last 2 days)     Date/Time   Weight    02/20/19 0600   161 (354 94)  (Abnormal)     02/20/19 0545   161 (354 94)  (Abnormal)     02/19/19 0531   155 (342 37)  (Abnormal)     02/18/19 0437   162 (357 59)  (Abnormal)             Telemetry Review: Atrial flutter HR 60s, no ectopy     Physical Exam   Constitutional: He is oriented to person, place, and time  No distress  Neck: Neck supple  Cardiovascular: Normal rate and normal heart sounds  Pulmonary/Chest: Effort normal    Decreased in bases   3 L NC    Abdominal: Soft  Bowel sounds are normal    Morbidly obese    Musculoskeletal: He exhibits edema  + 2 edema bilateral LE    Neurological: He is alert and oriented to person, place, and time  Skin: Skin is warm and dry  He is not diaphoretic     Bilateral toes wounds    Psychiatric:   bizarre affect        LABORATORY RESULTS      CBC with diff:   Results from last 7 days   Lab Units 02/19/19  1724 02/18/19  0909 02/17/19  0704   WBC Thousand/uL  --  10 54* 9 42   HEMOGLOBIN g/dL 7 6* 7 0* 7 5*   HEMATOCRIT % 24 6* 23 1* 25 3*   MCV fL  --  88 87   PLATELETS Thousands/uL  --  238 239   MCH pg  --  26 3* 25 8*   MCHC g/dL  --  29 9* 29 6*   RDW %  --  17 3* 17 2*   MPV fL  --  10 8 11 3   NRBC AUTO /100 WBCs  --  0 0       CMP:  Results from last 7 days   Lab Units 02/15/19  1358   POTASSIUM mmol/L 4 2   CHLORIDE mmol/L 93*   CO2 mmol/L 25   BUN mg/dL 93*   CREATININE mg/dL 7 14*   CALCIUM mg/dL 8 2*   AST U/L 21   ALT U/L 7*   ALK PHOS U/L 72   EGFR ml/min/1 73sq m 8       BMP:  Results from last 7 days   Lab Units 02/15/19  1358   POTASSIUM mmol/L 4 2   CHLORIDE mmol/L 93*   CO2 mmol/L 25   BUN mg/dL 93*   CREATININE mg/dL 7 14*   CALCIUM mg/dL 8 2*       Lab Results   Component Value Date    NTBNP 25,015 (H) 02/06/2019    NTBNP 25,548 (H) 01/23/2019       Results from last 7 days   Lab Units 02/18/19  0909 02/17/19  1027 02/16/19  0443 02/15/19  1358 02/14/19  0754 02/14/19  0635   INR  1 73* 4 27* 3 73* 3 29* 3 41* 3 47*       Lipid Profile:   Lab Results   Component Value Date    CHOL 146 2014    CHOL 145 2014     Lab Results   Component Value Date    HDL 44 2019    HDL 41 2018    HDL 52 2017     Lab Results   Component Value Date    LDLCALC 77 2019    LDLCALC 57 2018    LDLCALC 129 (H) 2017     Lab Results   Component Value Date    TRIG 129 2019    TRIG 102 2018    TRIG 71 2017       Cardiac testing:   Results for orders placed during the hospital encounter of 19   Echo complete with contrast if indicated    Narrative [facility] [address]  [address] [place] [address], [place] [address]  [phone]    Transthoracic Echocardiogram  2D, Doppler, and Color Doppler    Study date:  2019    Patient: Maddy Salinas  MR number: [id number]  Account number: [de-identified]  : 1960  Age: 60 years  Gender: Male  Status: Inpatient  Location: Bedside  Height: 72 in  Weight: 339 lb  BP: 89/ 54 mmHg    Indications: chest pain    Diagnoses: R07 9 - Chest pain, unspecified    Sonographer:  MADHU Patterson  Primary Physician:  Vee Albarran DO  Referring Physician:  Yanci Bhardwaj DO  Group:  [facility]  Interpreting Physician:  Christopher Ribeiro DO    SUMMARY    PROCEDURE INFORMATION:  This was a technically difficult study despite the administration of echo contrast     LEFT VENTRICLE:  Systolic function was markedly reduced  Ejection fraction was estimated to be 30 %  There was severe diffuse hypokinesis with no obvious identifiable regional variations  Wall thickness was moderately increased  Concentric hypertrophy was present  VENTRICULAR SEPTUM:  There was dyssynergic motion  RIGHT VENTRICLE:  The ventricle was mildly dilated  Systolic function was moderately to markedly reduced  LEFT ATRIUM:  The atrium was mildly dilated  RIGHT ATRIUM:  The atrium was mildly dilated      AORTIC VALVE:  A bioprosthesis was present  It was not well visualized  Mean transvalvular gradient 13 mmHg  TRICUSPID VALVE:  There was mild regurgitation  PERICARDIUM:  A small, partially loculated pericardial effusion was identified along the left ventricular free wall  HISTORY: PRIOR HISTORY: Aortic valve prosthesis    PROCEDURE: The procedure was performed at the bedside  This was a routine study  The transthoracic approach was used  The study included complete 2D imaging, complete spectral Doppler, and color Doppler  The heart rate was 80 bpm, at the  start of the study  Intravenous contrast (Definity solution [1 3 ml Definity/8 7ml normal saline solution]) was administered  Intravenous contrast (Definity solution [1 3 ml Definity/8 7ml normal saline solution]) was administered to  opacify the left ventricle and enhance Doppler signals  Echocardiographic views were limited due to low windows and patient on mechanical ventilator  This was a technically difficult study despite the administration of echo contrast     LEFT VENTRICLE: Size was normal  Systolic function was markedly reduced  Ejection fraction was estimated to be 30 %  There was severe diffuse hypokinesis with no obvious identifiable regional variations  Wall thickness was moderately  increased  Concentric hypertrophy was present  DOPPLER: Normal sinus rhythm was absent  The study was not technically sufficient to allow evaluation of LV diastolic function  VENTRICULAR SEPTUM: There was dyssynergic motion  RIGHT VENTRICLE: The ventricle was mildly dilated  Systolic function was moderately to markedly reduced  LEFT ATRIUM: The atrium was mildly dilated  RIGHT ATRIUM: The atrium was mildly dilated  MITRAL VALVE: Not well visualized  DOPPLER: The transmitral velocity was within the normal range  There was no evidence for stenosis  There was no significant regurgitation  AORTIC VALVE: A bioprosthesis was present  It was not well visualized   Mean transvalvular gradient 13 mmHg  DOPPLER: There was no significant regurgitation  TRICUSPID VALVE: The valve structure was normal  There was normal leaflet separation  DOPPLER: The transtricuspid velocity was within the normal range  There was no evidence for stenosis  There was mild regurgitation  The tricuspid jet  envelope definition was inadequate for estimation of RV systolic pressure  PULMONIC VALVE: Leaflets exhibited normal thickness, no calcification, and normal cuspal separation  DOPPLER: The transpulmonic velocity was within the normal range  There was no significant regurgitation  PERICARDIUM: A small, partially loculated pericardial effusion was identified along the left ventricular free wall  The pericardium was normal in appearance  AORTA: The root exhibited normal size  SYSTEM MEASUREMENT TABLES    2D  %FS: 15 83 %  Ao Diam: 3 09 cm  EDV(Teich): 221 88 ml  EF(Teich): 32 54 %  ESV(Teich): 149 69 ml  IVSd: 1 59 cm  LA Diam: 5 18 cm  LVIDd: 6 58 cm  LVIDs: 5 54 cm  LVPWd: 1 43 cm  SV(Teich): 72 19 ml    CW  AV Env  Ti: 233 56 ms  AV VTI: 49 77 cm  AV Vmax: 2 64 m/s  AV Vmax: 2 67 m/s  AV Vmean: 2 13 m/s  AV maxP 95 mmHg  AV maxP 53 mmHg  AV meanP 73 mmHg    PW  LVOT Env  Ti: 215 22 ms  LVOT VTI: 11 41 cm  LVOT Vmax: 0 59 m/s  LVOT Vmax: 0 7 m/s  LVOT Vmean: 0 52 m/s  LVOT maxP 78 mmHg  LVOT maxP mmHg  LVOT meanP 24 mmHg  MV E Deep: 1 01 m/s    [employer] Accredited Echocardiography Laboratory    Prepared and electronically signed by    Greg Painting DO  Signed 2019 10:14:55       Results for orders placed during the hospital encounter of 19   CHON    Narrative [facility]  [address]  [phone]    Transesophageal Echocardiogram  2D, Doppler, and Color Doppler    Study date:  2019    Patient: Aleja Kendall  MR number: [id number]  Account number: [de-identified]  : 1960  Age: 60 years  Gender: Male  Status: Inpatient  Location: Bedside  Height: 69 in  Weight: 319 lb  BP: 101/ 54 mmHg    Indications: Bacteremia  Diagnoses: R78 81 - Bacteremia    Sonographer:  Franky Laboy RDCS  Interpreting Physician:  Todd Lorenz DO  Primary Physician:  Lola Tsang DO  Referring Physician:  Baldemar Hay DO  Group:  [facility]  Cardiology Fellow:  Cyndie Blandon MD    IMPRESSIONS:  There was no echocardiographic evidence for valvular vegetation  SUMMARY    LEFT VENTRICLE:  Systolic function was moderately reduced  Ejection fraction was estimated to be 40 %  There was moderate diffuse hypokinesis  Wall thickness was mildly increased  There was mild concentric hypertrophy  RIGHT VENTRICLE:  The size was normal   Systolic function was mildly reduced  LEFT ATRIUM:  The atrium was mildly dilated  ATRIAL SEPTUM:  There was a small patent foramen ovale with left to right shunt identified by color Doppler  RIGHT ATRIUM:  The atrium was mildly dilated  MITRAL VALVE:  There was trace regurgitation  There was no echocardiographic evidence of vegetation  AORTIC VALVE:  A bioprosthesis was present  It exhibited normal function  There was no echocardiographic evidence of vegetation  TRICUSPID VALVE:  There was mild regurgitation  There was no echocardiographic evidence of vegetation  HISTORY: PRIOR HISTORY: Aortic valve replacement, NSTEMI, Cardiomyopathy, Acute kidney injury  PROCEDURE: The procedure was performed at the bedside  This was a routine study  The risks and alternatives of the procedure were explained to the patient's next of kin and informed consent was obtained  The transesophageal approach was  used  The study included complete 2D imaging, complete spectral Doppler, and color Doppler  The heart rate was 113 bpm, at the start of the study   An adult omniplane probe was inserted by the cardiology fellow under direct supervision of  the attending cardiologist  Intubated with ease  One intubation attempt(s)  There was no blood detected on the probe  Image quality was adequate  There were no complications during the procedure  MEDICATIONS: Sedation administered by  bedside nurse  LEFT VENTRICLE: Size was normal  Systolic function was moderately reduced  Ejection fraction was estimated to be 40 %  There was moderate diffuse hypokinesis  Wall thickness was mildly increased  There was mild concentric hypertrophy  DOPPLER: The study was not technically sufficient to allow evaluation of LV diastolic function  RIGHT VENTRICLE: The size was normal  Systolic function was mildly reduced  Wall thickness was normal     LEFT ATRIUM: The atrium was mildly dilated  No thrombus was identified  APPENDAGE: The appendage was small  No thrombus was identified  DOPPLER: The function was normal (normal emptying velocity)  ATRIAL SEPTUM: There was a small patent foramen ovale with left to right shunt identified by color Doppler  RIGHT ATRIUM: The atrium was mildly dilated  No thrombus was identified  MITRAL VALVE: Valve structure was normal  There was normal leaflet separation  There was no echocardiographic evidence of vegetation  DOPPLER: There was trace regurgitation  AORTIC VALVE: A bioprosthesis was present  It exhibited normal function  There was no echocardiographic evidence of vegetation  TRICUSPID VALVE: The valve structure was normal  There was normal leaflet separation  There was no echocardiographic evidence of vegetation  DOPPLER: There was mild regurgitation  PULMONIC VALVE: Leaflets exhibited normal thickness, no calcification, and normal cuspal separation  There was no echocardiographic evidence of vegetation  DOPPLER: There was no significant regurgitation  PERICARDIUM: There was no pericardial effusion seen in the images obtained  AORTA: The root exhibited normal size  There was no atheroma   There was no evidence for dissection  There was no evidence for aneurysm      [facility] [facility]    Prepared and electronically signed by    Santa Parisi DO  Signed 25-Jan-2019 14:01:14       Meds/Allergies   all current active meds have been reviewed and current meds:   Current Facility-Administered Medications   Medication Dose Route Frequency    acetaminophen (TYLENOL) tablet 650 mg  650 mg Oral Q6H PRN    amiodarone tablet 200 mg  200 mg Oral Daily With Breakfast    aspirin (ECOTRIN LOW STRENGTH) EC tablet 81 mg  81 mg Oral Daily    atorvastatin (LIPITOR) tablet 40 mg  40 mg Oral Daily With Dinner    bisacodyl (DULCOLAX) rectal suppository 10 mg  10 mg Rectal Daily PRN    calcium acetate (PHOSLO) capsule 1,334 mg  1,334 mg Oral TID With Meals    diphenhydrAMINE (BENADRYL) tablet 25 mg  25 mg Oral Q6H PRN    guaiFENesin (MUCINEX) 12 hr tablet 600 mg  600 mg Oral Q12H [facility]    hydrocortisone 1 % cream   Topical BID    HYDROmorphone (DILAUDID) injection 1 mg  1 mg Intravenous Q3H PRN    iron sucrose (VENOFER) 100 mg in sodium chloride 0 9 % 100 mL IVPB  100 mg Intravenous Once per day on Mon Wed Fri    metoprolol (LOPRESSOR) injection 5 mg  5 mg Intravenous Q6H PRN    metoprolol succinate (TOPROL-XL) 24 hr tablet 100 mg  100 mg Oral Q12H    nystatin (MYCOSTATIN) powder   Topical BID    oxyCODONE (ROXICODONE) immediate release tablet 10 mg  10 mg Oral Q4H PRN    polyethylene glycol (MIRALAX) packet 17 g  17 g Oral Daily    polyvinyl alcohol (LIQUIFILM TEARS) 1 4 % ophthalmic solution 1 drop  1 drop Both Eyes Q3H PRN    QUEtiapine (SEROquel) tablet 25 mg  25 mg Oral HS    senna-docusate sodium (SENOKOT S) 8 6-50 mg per tablet 1 tablet  1 tablet Oral BID    torsemide (DEMADEX) tablet 80 mg  80 mg Oral Daily    vancomycin (VANCOCIN) IVPB (premix) 1,000 mg  10 mg/kg (Adjusted) Intravenous After Dialysis    warfarin (COUMADIN) tablet 2 mg  2 mg Oral Daily (warfarin)     Medications Prior to Admission   Medication    amLODIPine (NORVASC) 5 mg tablet    ascorbic acid (VITAMIN C) 500 MG tablet    aspirin (ECOTRIN) 325 mg EC tablet    fluticasone (FLONASE) 50 mcg/act nasal spray    loratadine (CLARITIN) 10 mg tablet    metoprolol tartrate (LOPRESSOR) 100 mg tablet    potassium chloride (K-DUR,KLOR-CON) 20 mEq tablet    pravastatin (PRAVACHOL) 40 mg tablet    torsemide (DEMADEX) 20 mg tablet       Counseling / Coordination of Care  Total floor / unit time spent today 20 minutes  Greater than 50% of total time was spent with the patient and / or family counseling and / or coordination of care  ** Please Note: Dragon 360 Dictation voice to text software may have been used in the creation of this document   **

## 2019-02-20 NOTE — PROGRESS NOTES
[address] [facility] Progress Note  Patient: Sushila Martines 60 y o  male   MRN: [id number]  PCP: Saskia Roberts DO  Unit/Bed#: [facility] 507-01 Encounter: [id number]  Date Of Visit: 02/20/19    Assessment:    Principal Problem:    Staphylococcus aureus bacteremia  Active Problems:    Increased BMI    Stress-induced cardiomyopathy    Acute respiratory failure with hypoxia ([facility] [address] )    History of aortic valve replacement with bioprosthetic valve    Atrial fibrillation (HCC)    Transaminitis    Anemia    Leukocytosis    Cerebrovascular accident ([facility] [address] )    PATRICK (acute kidney injury) ([facility] [address] )    Hypervolemia    Acute systolic CHF (congestive heart failure) ([facility])    Toxic metabolic encephalopathy    Skin rash    Chronic kidney disease-mineral and bone disorder    Hyponatremia      Plan:    Plan:     1  MSSA bacteremia, negative CHON, negative repeat BC, per ID, plan for 6 weeks of antibiotic through 3/10    2  Recent shock, likely multifactorial,  cardiogenic/septic, resolved    3  PATRICK secondary to ATN,  now HD dependent on MWF, nephrology is following,  patient pulled HD catheter out, status post catheter placement by IR again on 2/18/2019  Plan is to due to our sessions today and tomorrow and then switch to Tuesday Thursday Saturday schedule for dialysis on discharge  4  Acute systolic CHF/cardiomyopathy with EF 30%,  cardiology is following, on Lopressor and Demadex, volume status managed by HD  Repeat Echo shows EF 55% with Gr 2 diastolic dysfunction    5  S/p bioprosthetic AVR, negative CHON for vegetation    6  Acute hypoxic respiratory failure secondary to above, resolved,  s/p extubation, continue supplemental oxygen to keep O2 saturation greater than 90 percent  Wean off as tolerated  7  New onset AFib/ A flutter, with fluctuating heart rate, Toprol XL  was increased 2/18, cont Coumadin  Amiodarone stopped  8  Type 2 MI- in the setting of atrial fibrillation      9  Toxic metabolic encephalopathy with abnormal head CT scan, evaluated by Neurology,  likely ischemic event bilaterally,  recommendation to repeat head CT scan in 2 weeks, continue aspirin and  Statin, bilateral carotid ultrasound without significant stenosis  Mental status stable and back to baseline as per patient and wife yesterday  10  Anemia of chronic disease, with drop in hemoglobin on 2019 requiring transfusion  hemoglobin stable  11  Morbid obesity    12  Skin rash, possibly  Xerosis/contact irritation, continue hydrocortisone cream, p r n  Naomie Harris, monitor  Amiodarone stopped by Cardiology  13  Abdominal distention, patient feeling better today, ultrasound abdomen and negative for ascites               VTE Pharmacologic Prophylaxis:   Pharmacologic: Warfarin (Coumadin)  Mechanical VTE Prophylaxis in Place: Yes   Patient Centered Rounds: I have performed bedside rounds with nursing staff today    Discussions with Specialists or Other Care Team Provider: cardio   Education and Discussions with Family / Patient: pt  Tried calling wife - left message   Time Spent for Care: 30 minutes  More than 50% of total time spent on counseling and coordination of care as described above    Current Length of Stay: 27 day(s)   Current Patient Status: Inpatient   Certification Statement: The patient will continue to require additional inpatient hospital stay due to [facility]  Discharge Plan / Estimated Discharge Date: possibly Friday  Code Status: Level 1 - Full Code        Subjective:   Pt seen and examined by me this morning  Pt denies any specific complaints  [place] he is feeling much better  No shortness of breath, chest pain or palpitations  No dizziness or lightheadedness      Objective:     Vitals:   Temp (24hrs), Av °F (36 7 °C), Min:97 6 °F (36 4 °C), Max:98 5 °F (36 9 °C)    Temp:  [97 6 °F (36 4 °C)-98 5 °F (36 9 °C)] 98 3 °F (36 8 °C)  HR:  [63-91] 69  Resp:  [17-21] 21  BP: (103-134)/(45-76) 126/62  SpO2:  [94 %-97 %] 94 %  Body mass index is 52 42 kg/m²  Input and Output Summary (last 24 hours): Intake/Output Summary (Last 24 hours) at 2/20/2019 1510  Last data filed at 2/20/2019 1057  Gross per 24 hour   Intake 500 ml   Output 2000 ml   Net -1500 ml       Physical Exam:     Physical Exam    Constitutional: Pt appears well-developed and well-nourished  Not in any acute distress  Morbidly obese  Cardiovascular: Normal rate, irregular rhythm, normal heart sounds  Exam reveals no gallop and no friction rub  No murmur heard  Pulmonary/Chest: Effort normal and breath sounds normal - decreased air entry at bases bilaterally  No respiratory distress  Pt has no wheezes or rales  Abdominal: Soft  Non-distended, Non-tender  Bowel sounds are normal    Musculoskeletal: Normal range of motion  Neurological:  Awake and alert  Moving all extremities spontaneously  Psychiatric: normal mood and affect  Additional Data:     Labs:    Results from last 7 days   Lab Units 02/20/19  0831  02/18/19  0909 02/17/19  0704   WBC Thousand/uL 11 91*  --  10 54* 9 42   HEMOGLOBIN g/dL 7 4*   < > 7 0* 7 5*   HEMATOCRIT % 24 1*   < > 23 1* 25 3*   PLATELETS Thousands/uL 226  --  238 239   NEUTROS PCT %  --   --   --  81*   LYMPHS PCT %  --   --   --  5*   LYMPHO PCT %  --   --  1*  --    MONOS PCT %  --   --   --  7   MONO PCT %  --   --  3*  --    EOS PCT %  --   --  4 5    < > = values in this interval not displayed  Results from last 7 days   Lab Units 02/15/19  1358   POTASSIUM mmol/L 4 2   CHLORIDE mmol/L 93*   CO2 mmol/L 25   BUN mg/dL 93*   CREATININE mg/dL 7 14*   CALCIUM mg/dL 8 2*   ALK PHOS U/L 72   ALT U/L 7*   AST U/L 21     Results from last 7 days   Lab Units 02/20/19  0831   INR  1 49*       * I Have Reviewed All Lab Data Listed Above  * Additional Pertinent Lab Tests Reviewed:  All Labs For Current Hospital Admission Reviewed    Imaging:    Imaging Reports Reviewed Today Include:   Imaging Personally Reviewed by Myself Includes:      Recent Cultures (last 7 days):           Last 24 Hours Medication List:     Current Facility-Administered Medications:  acetaminophen 650 mg Oral Q6H PRN Anna Mai MD    aspirin 81 mg Oral Daily Janette Harmon,     atorvastatin 40 mg Oral Daily With Rewarder, DO    bisacodyl 10 mg Rectal Daily PRN HERBERT Estes    calcium acetate 1,334 mg Oral TID With Meals Michael Quintanilla,     diphenhydrAMINE 25 mg Oral Q6H PRN Janette Harmon, DO    guaiFENesin 600 mg Oral Q12H [facility] Rossy Pearl PA-C    hydrocortisone  Topical BID Janette Harmon, DO    HYDROmorphone 1 mg Intravenous Q3H PRN Lidya Martinez    [START ON 2/23/2019] iron sucrose 100 mg Intravenous Once per day on Tue Thu Sat Sharmon Denver, DO    metoprolol 5 mg Intravenous Q6H PRN Vita Dakins, DO    metoprolol succinate 100 mg Oral Q12H Sukhjinder Perry, DO    nystatin  Topical BID HERBERT Estes    oxyCODONE 10 mg Oral Q4H PRN Anna Mai MD    polyethylene glycol 17 g Oral Daily KATHY Mathias    polyvinyl alcohol 1 drop Both Eyes Q3H PRN Lara Barron PA-C    QUEtiapine 25 mg Oral HS Lara Barron PA-C    senna-docusate sodium 1 tablet Oral BID Janette Harmon DO    torsemide 80 mg Oral Daily Michael Quintanilla DO    vancomycin 10 mg/kg (Adjusted) Intravenous After Dialysis Sarah Dowling MD Last Rate: 1,000 mg (02/20/19 1001)   warfarin 2 mg Oral Daily (warfarin) Janette Harmon DO         Today, Patient Was Seen By: Nayely Ga MD    ** Please Note: This note has been constructed using a voice recognition system   **

## 2019-02-20 NOTE — OCCUPATIONAL THERAPY NOTE
Occupational Therapy Cancellation Note        Patient Name: Wilberto Suggs  CAMERONS[facility]MARIUM Date: 2/20/2019    Chart reviewed  Attempted to see pt  Pt off the floor at dialysis  OT will continue to follow to see as able        Faheem Barnes MS, OTR/L

## 2019-02-20 NOTE — PHYSICAL THERAPY NOTE
PT Cancellation    Attempted to see  Pt is currently off the floor in HD  Will follow and see as able      Oscar Escudero, PT, DPT

## 2019-02-20 NOTE — RESTORATIVE TECHNICIAN NOTE
Restorative Specialist Mobility Note       Activity: Bedrest, Dangle, Stand at bedside     Assistive Device: Other (Comment)(HHA x 2-3)     Ambulation Response: Tolerated fairly well  Repositioned: Supine                          Assisted therapy during session  For all/additional information please see therapy note(s)        Oliverio Mathur Restorative Technician, BS

## 2019-02-21 ENCOUNTER — APPOINTMENT (INPATIENT)
Dept: DIALYSIS | Facility: HOSPITAL | Age: 59
DRG: 871 | End: 2019-02-21
Payer: COMMERCIAL

## 2019-02-21 ENCOUNTER — APPOINTMENT (INPATIENT)
Dept: RADIOLOGY | Facility: HOSPITAL | Age: 59
DRG: 871 | End: 2019-02-21
Payer: COMMERCIAL

## 2019-02-21 LAB
ABO GROUP BLD: NORMAL
ALBUMIN SERPL BCP-MCNC: 1.9 G/DL (ref 3.5–5)
ALP SERPL-CCNC: 57 U/L (ref 46–116)
ALT SERPL W P-5'-P-CCNC: <6 U/L (ref 12–78)
ANION GAP SERPL CALCULATED.3IONS-SCNC: 9 MMOL/L (ref 4–13)
AST SERPL W P-5'-P-CCNC: 12 U/L (ref 5–45)
BASE EXCESS BLDA CALC-SCNC: -1 MMOL/L (ref -2–3)
BILIRUB SERPL-MCNC: 0.65 MG/DL (ref 0.2–1)
BLD GP AB SCN SERPL QL: NEGATIVE
BUN SERPL-MCNC: 74 MG/DL (ref 5–25)
CA-I BLD-SCNC: 1.05 MMOL/L (ref 1.12–1.32)
CALCIUM SERPL-MCNC: 7.3 MG/DL (ref 8.3–10.1)
CHLORIDE SERPL-SCNC: 100 MMOL/L (ref 100–108)
CO2 SERPL-SCNC: 25 MMOL/L (ref 21–32)
CREAT SERPL-MCNC: 5.16 MG/DL (ref 0.6–1.3)
ERYTHROCYTE [DISTWIDTH] IN BLOOD BY AUTOMATED COUNT: 16.5 % (ref 11.6–15.1)
FIO2 GAS DIL.REBREATH: 0.4 L
GFR SERPL CREATININE-BSD FRML MDRD: 11 ML/MIN/1.73SQ M
GLUCOSE SERPL-MCNC: 117 MG/DL (ref 65–140)
GLUCOSE SERPL-MCNC: 118 MG/DL (ref 65–140)
HCO3 BLDA-SCNC: 24.7 MMOL/L (ref 22–28)
HCT VFR BLD AUTO: 18.1 % (ref 36.5–49.3)
HCT VFR BLD AUTO: 20.3 % (ref 36.5–49.3)
HCT VFR BLD AUTO: 23.7 % (ref 36.5–49.3)
HCT VFR BLD CALC: 21 % (ref 36.5–49.3)
HGB BLD-MCNC: 5.3 G/DL (ref 12–17)
HGB BLD-MCNC: 6.4 G/DL (ref 12–17)
HGB BLD-MCNC: 7.7 G/DL (ref 12–17)
HGB BLDA-MCNC: 7.1 G/DL (ref 12–17)
INR PPP: 1.89 (ref 0.86–1.17)
INR PPP: 2.65 (ref 0.86–1.17)
MAGNESIUM SERPL-MCNC: 2.6 MG/DL (ref 1.6–2.6)
MCH RBC QN AUTO: 28.3 PG (ref 26.8–34.3)
MCHC RBC AUTO-ENTMCNC: 31.5 G/DL (ref 31.4–37.4)
MCV RBC AUTO: 90 FL (ref 82–98)
PCO2 BLD: 26 MMOL/L (ref 21–32)
PCO2 BLD: 45.1 MM HG (ref 36–44)
PH BLD: 7.35 [PH] (ref 7.35–7.45)
PHOSPHATE SERPL-MCNC: 3.9 MG/DL (ref 2.7–4.5)
PLATELET # BLD AUTO: 188 THOUSANDS/UL (ref 149–390)
PMV BLD AUTO: 10.4 FL (ref 8.9–12.7)
PO2 BLD: 30 MM HG (ref 75–129)
POTASSIUM BLD-SCNC: 4 MMOL/L (ref 3.5–5.3)
POTASSIUM SERPL-SCNC: 4.1 MMOL/L (ref 3.5–5.3)
PROT SERPL-MCNC: 4.7 G/DL (ref 6.4–8.2)
PROTHROMBIN TIME: 21.8 SECONDS (ref 11.8–14.2)
PROTHROMBIN TIME: 28.3 SECONDS (ref 11.8–14.2)
RBC # BLD AUTO: 2.26 MILLION/UL (ref 3.88–5.62)
RH BLD: POSITIVE
SAO2 % BLD FROM PO2: 54 % (ref 95–98)
SODIUM BLD-SCNC: 132 MMOL/L (ref 136–145)
SODIUM SERPL-SCNC: 134 MMOL/L (ref 136–145)
SPECIMEN EXPIRATION DATE: NORMAL
SPECIMEN SOURCE: ABNORMAL
VANCOMYCIN TROUGH SERPL-MCNC: 22.8 UG/ML (ref 10–20)
WBC # BLD AUTO: 22.95 THOUSAND/UL (ref 4.31–10.16)

## 2019-02-21 PROCEDURE — 36620 INSERTION CATHETER ARTERY: CPT | Performed by: PHYSICIAN ASSISTANT

## 2019-02-21 PROCEDURE — 94660 CPAP INITIATION&MGMT: CPT

## 2019-02-21 PROCEDURE — 85014 HEMATOCRIT: CPT | Performed by: PHYSICIAN ASSISTANT

## 2019-02-21 PROCEDURE — 82947 ASSAY GLUCOSE BLOOD QUANT: CPT

## 2019-02-21 PROCEDURE — 0BH17EZ INSERTION OF ENDOTRACHEAL AIRWAY INTO TRACHEA, VIA NATURAL OR ARTIFICIAL OPENING: ICD-10-PCS | Performed by: EMERGENCY MEDICINE

## 2019-02-21 PROCEDURE — 30233N1 TRANSFUSION OF NONAUTOLOGOUS RED BLOOD CELLS INTO PERIPHERAL VEIN, PERCUTANEOUS APPROACH: ICD-10-PCS | Performed by: INTERNAL MEDICINE

## 2019-02-21 PROCEDURE — 99232 SBSQ HOSP IP/OBS MODERATE 35: CPT | Performed by: INTERNAL MEDICINE

## 2019-02-21 PROCEDURE — 82803 BLOOD GASES ANY COMBINATION: CPT

## 2019-02-21 PROCEDURE — 84132 ASSAY OF SERUM POTASSIUM: CPT

## 2019-02-21 PROCEDURE — 5A1945Z RESPIRATORY VENTILATION, 24-96 CONSECUTIVE HOURS: ICD-10-PCS | Performed by: EMERGENCY MEDICINE

## 2019-02-21 PROCEDURE — 36556 INSERT NON-TUNNEL CV CATH: CPT | Performed by: PHYSICIAN ASSISTANT

## 2019-02-21 PROCEDURE — 99291 CRITICAL CARE FIRST HOUR: CPT | Performed by: EMERGENCY MEDICINE

## 2019-02-21 PROCEDURE — 97530 THERAPEUTIC ACTIVITIES: CPT

## 2019-02-21 PROCEDURE — 85018 HEMOGLOBIN: CPT | Performed by: PHYSICIAN ASSISTANT

## 2019-02-21 PROCEDURE — 97110 THERAPEUTIC EXERCISES: CPT

## 2019-02-21 PROCEDURE — 4A133B1 MONITORING OF ARTERIAL PRESSURE, PERIPHERAL, PERCUTANEOUS APPROACH: ICD-10-PCS | Performed by: EMERGENCY MEDICINE

## 2019-02-21 PROCEDURE — 5A2204Z RESTORATION OF CARDIAC RHYTHM, SINGLE: ICD-10-PCS | Performed by: EMERGENCY MEDICINE

## 2019-02-21 PROCEDURE — 85014 HEMATOCRIT: CPT

## 2019-02-21 PROCEDURE — 86923 COMPATIBILITY TEST ELECTRIC: CPT

## 2019-02-21 PROCEDURE — 85014 HEMATOCRIT: CPT | Performed by: INTERNAL MEDICINE

## 2019-02-21 PROCEDURE — 90935 HEMODIALYSIS ONE EVALUATION: CPT | Performed by: INTERNAL MEDICINE

## 2019-02-21 PROCEDURE — 4A133J1 MONITORING OF ARTERIAL PULSE, PERIPHERAL, PERCUTANEOUS APPROACH: ICD-10-PCS | Performed by: EMERGENCY MEDICINE

## 2019-02-21 PROCEDURE — 83735 ASSAY OF MAGNESIUM: CPT | Performed by: PHYSICIAN ASSISTANT

## 2019-02-21 PROCEDURE — 80202 ASSAY OF VANCOMYCIN: CPT | Performed by: FAMILY MEDICINE

## 2019-02-21 PROCEDURE — 82330 ASSAY OF CALCIUM: CPT

## 2019-02-21 PROCEDURE — 80053 COMPREHEN METABOLIC PANEL: CPT | Performed by: PHYSICIAN ASSISTANT

## 2019-02-21 PROCEDURE — 05HN33Z INSERTION OF INFUSION DEVICE INTO LEFT INTERNAL JUGULAR VEIN, PERCUTANEOUS APPROACH: ICD-10-PCS | Performed by: EMERGENCY MEDICINE

## 2019-02-21 PROCEDURE — 84295 ASSAY OF SERUM SODIUM: CPT

## 2019-02-21 PROCEDURE — P9016 RBC LEUKOCYTES REDUCED: HCPCS

## 2019-02-21 PROCEDURE — 85610 PROTHROMBIN TIME: CPT | Performed by: INTERNAL MEDICINE

## 2019-02-21 PROCEDURE — 71045 X-RAY EXAM CHEST 1 VIEW: CPT

## 2019-02-21 PROCEDURE — 84100 ASSAY OF PHOSPHORUS: CPT | Performed by: PHYSICIAN ASSISTANT

## 2019-02-21 PROCEDURE — 85027 COMPLETE CBC AUTOMATED: CPT | Performed by: PHYSICIAN ASSISTANT

## 2019-02-21 PROCEDURE — 97535 SELF CARE MNGMENT TRAINING: CPT

## 2019-02-21 PROCEDURE — 94760 N-INVAS EAR/PLS OXIMETRY 1: CPT

## 2019-02-21 PROCEDURE — C9113 INJ PANTOPRAZOLE SODIUM, VIA: HCPCS | Performed by: PHYSICIAN ASSISTANT

## 2019-02-21 PROCEDURE — 86901 BLOOD TYPING SEROLOGIC RH(D): CPT | Performed by: INTERNAL MEDICINE

## 2019-02-21 PROCEDURE — 86900 BLOOD TYPING SEROLOGIC ABO: CPT | Performed by: INTERNAL MEDICINE

## 2019-02-21 PROCEDURE — 03HY32Z INSERTION OF MONITORING DEVICE INTO UPPER ARTERY, PERCUTANEOUS APPROACH: ICD-10-PCS | Performed by: EMERGENCY MEDICINE

## 2019-02-21 PROCEDURE — 85610 PROTHROMBIN TIME: CPT | Performed by: PHYSICIAN ASSISTANT

## 2019-02-21 PROCEDURE — 85018 HEMOGLOBIN: CPT | Performed by: INTERNAL MEDICINE

## 2019-02-21 PROCEDURE — 86850 RBC ANTIBODY SCREEN: CPT | Performed by: INTERNAL MEDICINE

## 2019-02-21 PROCEDURE — 93005 ELECTROCARDIOGRAM TRACING: CPT

## 2019-02-21 RX ORDER — NOREPINEPHRINE BITARTRATE 1 MG/ML
INJECTION, SOLUTION INTRAVENOUS
Status: DISPENSED
Start: 2019-02-21 | End: 2019-02-22

## 2019-02-21 RX ORDER — MIDAZOLAM HYDROCHLORIDE 1 MG/ML
2 INJECTION INTRAMUSCULAR; INTRAVENOUS ONCE
Status: COMPLETED | OUTPATIENT
Start: 2019-02-21 | End: 2019-02-21

## 2019-02-21 RX ORDER — MAGNESIUM SULFATE HEPTAHYDRATE 40 MG/ML
2 INJECTION, SOLUTION INTRAVENOUS ONCE
Status: COMPLETED | OUTPATIENT
Start: 2019-02-21 | End: 2019-02-21

## 2019-02-21 RX ORDER — FENTANYL CITRATE 50 UG/ML
INJECTION, SOLUTION INTRAMUSCULAR; INTRAVENOUS
Status: COMPLETED
Start: 2019-02-21 | End: 2019-02-21

## 2019-02-21 RX ORDER — MIDAZOLAM HYDROCHLORIDE 1 MG/ML
INJECTION INTRAMUSCULAR; INTRAVENOUS
Status: COMPLETED
Start: 2019-02-21 | End: 2019-02-21

## 2019-02-21 RX ORDER — SODIUM CHLORIDE, SODIUM GLUCONATE, SODIUM ACETATE, POTASSIUM CHLORIDE, MAGNESIUM CHLORIDE, SODIUM PHOSPHATE, DIBASIC, AND POTASSIUM PHOSPHATE .53; .5; .37; .037; .03; .012; .00082 G/100ML; G/100ML; G/100ML; G/100ML; G/100ML; G/100ML; G/100ML
500 INJECTION, SOLUTION INTRAVENOUS ONCE
Status: COMPLETED | OUTPATIENT
Start: 2019-02-21 | End: 2019-02-21

## 2019-02-21 RX ORDER — MAGNESIUM SULFATE 1 G/100ML
1 INJECTION INTRAVENOUS ONCE
Status: COMPLETED | OUTPATIENT
Start: 2019-02-21 | End: 2019-02-21

## 2019-02-21 RX ORDER — FENTANYL CITRATE 50 UG/ML
50 INJECTION, SOLUTION INTRAMUSCULAR; INTRAVENOUS ONCE
Status: COMPLETED | OUTPATIENT
Start: 2019-02-21 | End: 2019-02-21

## 2019-02-21 RX ORDER — AMIODARONE HYDROCHLORIDE 50 MG/ML
INJECTION, SOLUTION INTRAVENOUS
Status: COMPLETED
Start: 2019-02-21 | End: 2019-02-21

## 2019-02-21 RX ORDER — FENTANYL CITRATE 50 UG/ML
100 INJECTION, SOLUTION INTRAMUSCULAR; INTRAVENOUS ONCE
Status: COMPLETED | OUTPATIENT
Start: 2019-02-21 | End: 2019-02-21

## 2019-02-21 RX ORDER — ALBUMIN (HUMAN) 12.5 G/50ML
25 SOLUTION INTRAVENOUS ONCE
Status: COMPLETED | OUTPATIENT
Start: 2019-02-21 | End: 2019-02-21

## 2019-02-21 RX ADMIN — CALCIUM ACETATE 1334 MG: 667 CAPSULE ORAL at 11:46

## 2019-02-21 RX ADMIN — TORSEMIDE 80 MG: 20 TABLET ORAL at 11:45

## 2019-02-21 RX ADMIN — MIDAZOLAM HYDROCHLORIDE 2 MG: 1 INJECTION, SOLUTION INTRAMUSCULAR; INTRAVENOUS at 22:38

## 2019-02-21 RX ADMIN — SODIUM CHLORIDE 8 MG/HR: 9 INJECTION, SOLUTION INTRAVENOUS at 20:16

## 2019-02-21 RX ADMIN — HYDROCORTISONE 1 APPLICATION: 1 CREAM TOPICAL at 11:48

## 2019-02-21 RX ADMIN — GUAIFENESIN 600 MG: 600 TABLET, EXTENDED RELEASE ORAL at 11:45

## 2019-02-21 RX ADMIN — FENTANYL CITRATE 50 MCG: 50 INJECTION, SOLUTION INTRAMUSCULAR; INTRAVENOUS at 22:38

## 2019-02-21 RX ADMIN — LIDOCAINE HYDROCHLORIDE 100 MG: 20 INJECTION INTRAVENOUS at 22:48

## 2019-02-21 RX ADMIN — AMIODARONE HYDROCHLORIDE: 50 INJECTION, SOLUTION INTRAVENOUS at 21:16

## 2019-02-21 RX ADMIN — AMIODARONE HYDROCHLORIDE 1 MG/MIN: 50 INJECTION, SOLUTION INTRAVENOUS at 21:17

## 2019-02-21 RX ADMIN — LIDOCAINE HYDROCHLORIDE 100 MG: 20 INJECTION INTRAVENOUS at 20:15

## 2019-02-21 RX ADMIN — SODIUM CHLORIDE 80 MG: 9 INJECTION, SOLUTION INTRAVENOUS at 20:16

## 2019-02-21 RX ADMIN — FENTANYL CITRATE 100 MCG: 50 INJECTION, SOLUTION INTRAMUSCULAR; INTRAVENOUS at 20:55

## 2019-02-21 RX ADMIN — MAGNESIUM SULFATE IN WATER 2 G: 40 INJECTION, SOLUTION INTRAVENOUS at 20:15

## 2019-02-21 RX ADMIN — MIDAZOLAM 2 MG: 1 INJECTION INTRAMUSCULAR; INTRAVENOUS at 20:55

## 2019-02-21 RX ADMIN — DEXTROSE MONOHYDRATE 150 MG: 5 INJECTION INTRAVENOUS at 22:35

## 2019-02-21 RX ADMIN — METOPROLOL SUCCINATE 100 MG: 100 TABLET, EXTENDED RELEASE ORAL at 11:45

## 2019-02-21 RX ADMIN — SODIUM CHLORIDE, SODIUM GLUCONATE, SODIUM ACETATE, POTASSIUM CHLORIDE, MAGNESIUM CHLORIDE, SODIUM PHOSPHATE, DIBASIC, AND POTASSIUM PHOSPHATE 500 ML: .53; .5; .37; .037; .03; .012; .00082 INJECTION, SOLUTION INTRAVENOUS at 20:15

## 2019-02-21 RX ADMIN — LIDOCAINE HYDROCHLORIDE 100 MG: 20 INJECTION, SOLUTION INTRAVENOUS at 20:48

## 2019-02-21 RX ADMIN — MAGNESIUM SULFATE HEPTAHYDRATE 1 G: 1 INJECTION, SOLUTION INTRAVENOUS at 21:11

## 2019-02-21 RX ADMIN — ALBUMIN HUMAN 25 G: 0.25 SOLUTION INTRAVENOUS at 08:51

## 2019-02-21 RX ADMIN — ATORVASTATIN CALCIUM 40 MG: 40 TABLET, FILM COATED ORAL at 16:21

## 2019-02-21 RX ADMIN — NYSTATIN: 100000 POWDER TOPICAL at 16:22

## 2019-02-21 RX ADMIN — CALCIUM ACETATE 1334 MG: 667 CAPSULE ORAL at 16:21

## 2019-02-21 RX ADMIN — PHYTONADIONE 10 MG: 10 INJECTION, EMULSION INTRAMUSCULAR; INTRAVENOUS; SUBCUTANEOUS at 20:55

## 2019-02-21 RX ADMIN — MIDAZOLAM HYDROCHLORIDE 2 MG: 1 INJECTION INTRAMUSCULAR; INTRAVENOUS at 20:55

## 2019-02-21 RX ADMIN — AMIODARONE HYDROCHLORIDE 150 MG: 50 INJECTION, SOLUTION INTRAVENOUS at 21:17

## 2019-02-21 RX ADMIN — OXYCODONE HYDROCHLORIDE 10 MG: 10 TABLET ORAL at 07:55

## 2019-02-21 RX ADMIN — NOREPINEPHRINE BITARTRATE 7 MCG/MIN: 1 INJECTION INTRAVENOUS at 21:14

## 2019-02-21 RX ADMIN — ASPIRIN 81 MG: 81 TABLET, COATED ORAL at 11:45

## 2019-02-21 RX ADMIN — VANCOMYCIN HYDROCHLORIDE 1000 MG: 1 INJECTION, SOLUTION INTRAVENOUS at 09:25

## 2019-02-21 NOTE — HEMODIALYSIS
Patient had a loose and watery brown BM  Patient cleaned up and repositioned prior to being transported back to room

## 2019-02-21 NOTE — PLAN OF CARE
Problem: Potential for Falls  Goal: Patient will remain free of falls  Description  INTERVENTIONS:  - Assess patient frequently for physical needs  -  Identify cognitive and physical deficits and behaviors that affect risk of falls  -  [place] fall precautions as indicated by assessment   - Educate patient/family on patient safety including physical limitations  - Instruct patient to call for assistance with activity based on assessment  - Modify environment to reduce risk of injury  - Consider OT/PT consult to assist with strengthening/mobility    Outcome: Progressing     Problem: Prexisting or High Potential for Compromised Skin Integrity  Goal: Skin integrity is maintained or improved  Description  INTERVENTIONS:  - Identify patients at risk for skin breakdown  - Assess and monitor skin integrity  - Assess and monitor nutrition and hydration status  - Monitor labs (i e  albumin)  - Assess for incontinence   - Turn and reposition patient  - Assist with mobility/ambulation  - Relieve pressure over bony prominences  - Avoid friction and shearing  - Provide appropriate hygiene as needed including keeping skin clean and dry  - Evaluate need for skin moisturizer/barrier cream  - Collaborate with interdisciplinary team (i e  Nutrition, Rehabilitation, etc )   - Patient/family teaching   Outcome: Progressing     Problem: Nutrition/Hydration-ADULT  Goal: Nutrient/Hydration intake appropriate for improving, restoring or maintaining nutritional needs  Description  Monitor and assess patient's nutrition/hydration status for malnutrition (ex- brittle hair, bruises, dry skin, pale skin and conjunctiva, muscle wasting, smooth red tongue, and disorientation)  Collaborate with interdisciplinary team and initiate plan and interventions as ordered  Monitor patient's weight and dietary intake as ordered or per policy  Utilize nutrition screening tool and intervene per policy   Determine patient's food preferences and provide high-protein, high-caloric foods as appropriate  INTERVENTIONS:  - Monitor oral intake, urinary output, labs, and treatment plans  - Assess nutrition and hydration status and recommend course of action  - Evaluate amount of meals eaten  - Assist patient with eating if necessary   - Allow adequate time for meals  - Recommend/ encourage appropriate diets, oral nutritional supplements, and vitamin/mineral supplements  - Order, calculate, and assess calorie counts as needed  - Recommend, monitor, and adjust tube feedings and TPN/PPN based on assessed needs  - Assess need for intravenous fluids  - Provide specific nutrition/hydration education as appropriate  - Include patient/family/caregiver in decisions related to nutrition   Outcome: Progressing     Problem: CARDIOVASCULAR - ADULT  Goal: Maintains optimal cardiac output and hemodynamic stability  Description  INTERVENTIONS:  - Monitor I/O, vital signs and rhythm  - Monitor for S/S and trends of decreased cardiac output i e  bleeding, hypotension  - Administer and titrate ordered vasoactive medications to optimize hemodynamic stability  - Assess quality of pulses, skin color and temperature  - Assess for signs of decreased coronary artery perfusion - ex   Angina  - Instruct patient to report change in severity of symptoms   Outcome: Progressing  Goal: Absence of cardiac dysrhythmias or at baseline rhythm  Description  INTERVENTIONS:  - Continuous cardiac monitoring, monitor vital signs, obtain 12 lead EKG if indicated  - Administer antiarrhythmic and heart rate control medications as ordered  - Monitor electrolytes and administer replacement therapy as ordered   Outcome: Progressing     Problem: METABOLIC, FLUID AND ELECTROLYTES - ADULT  Goal: Electrolytes maintained within normal limits  Description  INTERVENTIONS:  - Monitor labs and assess patient for signs and symptoms of electrolyte imbalances  - Administer electrolyte replacement as ordered  - Monitor response to electrolyte replacements, including repeat lab results as appropriate  - Instruct patient on fluid and nutrition as appropriate   Outcome: Progressing  Goal: Fluid balance maintained  Description  INTERVENTIONS:  - Monitor labs and assess for signs and symptoms of volume excess or deficit  - Monitor I/O and WT  - Instruct patient on fluid and nutrition as appropriate   Outcome: Progressing     Problem: PAIN - ADULT  Goal: Verbalizes/displays adequate comfort level or baseline comfort level  Description  Interventions:  - Encourage patient to monitor pain and request assistance  - Assess pain using appropriate pain scale  - Administer analgesics based on type and severity of pain and evaluate response  - Implement non-pharmacological measures as appropriate and evaluate response  - Consider cultural and social influences on pain and pain management  - Notify physician/advanced practitioner if interventions unsuccessful or patient reports new pain   Outcome: Progressing     Problem: INFECTION - ADULT  Goal: Absence or prevention of progression during hospitalization  Description  INTERVENTIONS:  - Assess and monitor for signs and symptoms of infection  - Monitor lab/diagnostic results  - Monitor all insertion sites, i e  indwelling lines, tubes, and drains  - Monitor endotracheal (as able) and nasal secretions for changes in amount and color  - Declo appropriate cooling/warming therapies per order  - Administer medications as ordered  - Instruct and encourage patient and family to use good hand hygiene technique  - Identify and instruct in appropriate isolation precautions for identified infection/condition    Outcome: Progressing     Problem: SAFETY ADULT  Goal: Patient will remain free of falls  Description  INTERVENTIONS:  - Assess patient frequently for physical needs  -  Identify cognitive and physical deficits and behaviors that affect risk of falls    -  [place] fall precautions as indicated by assessment   - Educate patient/family on patient safety including physical limitations  - Instruct patient to call for assistance with activity based on assessment  - Modify environment to reduce risk of injury  - Consider OT/PT consult to assist with strengthening/mobility    Outcome: Progressing  Goal: Maintain or return to baseline ADL function  Description  INTERVENTIONS:  -  Assess patient's ability to carry out ADLs; assess patient's baseline for ADL function and identify physical deficits which impact ability to perform ADLs (bathing, care of mouth/teeth, toileting, grooming, dressing, etc )  - Assess/evaluate cause of self-care deficits   - Assess range of motion  - Assess patient's mobility; develop plan if impaired  - Assess patient's need for assistive devices and provide as appropriate  - Encourage maximum independence but intervene and supervise when necessary  ¯ Involve family in performance of ADLs  ¯ Assess for home care needs following discharge   ¯ Request OT consult to assist with ADL evaluation and planning for discharge  ¯ Provide patient education as appropriate    Outcome: Progressing  Goal: Maintain or return mobility status to optimal level  Description  INTERVENTIONS:  - Assess patient's baseline mobility status (ambulation, transfers, stairs, etc )    - Identify cognitive and physical deficits and behaviors that affect mobility  - Identify mobility aids required to assist with transfers and/or ambulation (gait belt, sit-to-stand, lift, walker, cane, etc )  - [place] fall precautions as indicated by assessment  - Record patient progress and toleration of activity level on Mobility SBAR; progress patient to next Phase/Stage  - Instruct patient to call for assistance with activity based on assessment  - Request Rehabilitation consult to assist with strengthening/weightbearing, etc     Outcome: Progressing     Problem: DISCHARGE PLANNING  Goal: Discharge to home or other facility with appropriate resources  Description  INTERVENTIONS:  - Identify barriers to discharge w/patient and caregiver  - Arrange for needed discharge resources and transportation as appropriate  - Identify discharge learning needs (meds, wound care, etc )  - Arrange for interpretive services to assist at discharge as needed  - Refer to Case Management Department for coordinating discharge planning if the patient needs post-hospital services based on physician/advanced practitioner order or complex needs related to functional status, cognitive ability, or social support system   Outcome: Progressing     Problem: Knowledge Deficit  Goal: Patient/family/caregiver demonstrates understanding of disease process, treatment plan, medications, and discharge instructions  Description  Complete learning assessment and assess knowledge base    Interventions:  - Provide teaching at level of understanding  - Provide teaching via preferred learning methods   Outcome: Progressing     Problem: GENITOURINARY - ADULT  Goal: Maintains or returns to baseline urinary function  Description  INTERVENTIONS:  - Assess urinary function  - Encourage oral fluids to ensure adequate hydration  - Administer IV fluids as ordered to ensure adequate hydration  - Administer ordered medications as needed  - Offer frequent toileting  - Follow urinary retention protocol if ordered   Outcome: Progressing  Goal: Absence of urinary retention  Description  INTERVENTIONS:  - Assess patient?s ability to void and empty bladder  - Monitor I/O  - Bladder scan as needed  - Discuss with physician/AP medications to alleviate retention as needed  - Discuss catheterization for long term situations as appropriate   Outcome: Progressing     Problem: DISCHARGE PLANNING - CARE MANAGEMENT  Goal: Discharge to post-acute care or home with appropriate resources  Description  INTERVENTIONS:  - Conduct assessment to determine patient/family and health care team treatment goals, and need for post-acute services based on payer coverage, community resources, and patient preferences, and barriers to discharge  - Address psychosocial, clinical, and financial barriers to discharge as identified in assessment in conjunction with the patient/family and health care team  - Arrange appropriate level of post-acute services according to patient's   needs and preference and payer coverage in collaboration with the physician and health care team  - Communicate with and update the patient/family, physician, and health care team regarding progress on the discharge plan  - Arrange appropriate transportation to post-acute venues  - Pt to d/c with appropriate resources when medically stable     Outcome: Progressing

## 2019-02-21 NOTE — QUICK NOTE
Called and updated spoke to Pt's wife Jerman Okeefe  Updated her about pt's condition and that he is being moved to ICU  She said she will come in tomorrow

## 2019-02-21 NOTE — PHYSICAL THERAPY NOTE
Physical Therapy Progress Note     02/21/19 1215   Pain Assessment   Pain Assessment 0-10   Pain Score 6   Pain Type Chronic pain   Pain Location Leg   Pain Orientation Left   Pain Descriptors Aching   Pain Frequency Intermittent  (With pressure or contact )   Hospital Pain Intervention(s) Repositioned; Emotional support   Response to Interventions Tolerated  Restrictions/Precautions   Other Precautions Pain; Fall Risk;O2;Telemetry   Subjective   Subjective The pt  notes that he is less itchy today, but that he does not feel well  Bed Mobility   Rolling R 2  Maximal assistance   Additional items Assist x 1;Assist x 2; Increased time required;Verbal cues;LE management   Rolling L 2  Maximal assistance   Additional items Assist x 1;Assist x 2; Increased time required;Verbal cues;LE management   Activity Tolerance   Activity Tolerance Patient limited by fatigue;Patient limited by pain   Nurse [address]UD[address] Saunders  Exercises   THR Supine;Bilateral;AAROM;10 reps   Assessment   Prognosis Fair   Problem List Decreased strength;Decreased range of motion;Decreased endurance; Impaired balance;Decreased mobility; Decreased coordination;Decreased cognition; Impaired judgement;Decreased safety awareness;Pain;Obesity   Assessment The pt  was much more fatigued, and he required more time and instruction to follow one-step commands  He was incontinent of bowel twice, and he required continued assistance in order to roll and maintain rolling while being cleaned up  He was then able to complete therapeutic exercise with assistance and continued instruction  Deferred sitting upright due to the pt's continued incontinence, lethargy, and difficulty following commands  Barriers to Discharge Inaccessible home environment;Decreased caregiver support   Goals   Patient Goals To rest    STG Expiration Date 03/02/19   Treatment Day 7   Plan   Treatment/Interventions Functional transfer training;LE strengthening/ROM; Therapeutic exercise; Endurance training;Patient/family training;Bed mobility   Progress Slow progress, decreased activity tolerance   PT Frequency   (3-5x a week )   Recommendation   Recommendation Short-term skilled PT     Sophia Yang, PTA

## 2019-02-21 NOTE — PLAN OF CARE
Problem: OCCUPATIONAL THERAPY ADULT  Goal: Performs self-care activities at highest level of function for planned discharge setting  See evaluation for individualized goals  Description  Treatment Interventions: ADL retraining, Functional transfer training, UE strengthening/ROM, Endurance training, Cognitive reorientation, Patient/family training, Equipment evaluation/education, Fine motor coordination activities, Compensatory technique education, Continued evaluation, Energy conservation, Activityengagement          See flowsheet documentation for full assessment, interventions and recommendations  Outcome: Progressing  Note:   Limitation: Decreased ADL status, Decreased UE ROM, Decreased UE strength, Decreased Safe judgement during ADL, Decreased cognition, Decreased endurance, Decreased fine motor control, Decreased self-care trans, Decreased high-level ADLs  Prognosis: Fair  Assessment: Patient participated in Skilled OT session this date with interventions consisting of bed mobility, ADL re-training, cognitive reorientation, UE ROM  Patient agreeable to OT treatment session, upon arrival patient was found supine in bed  Patient requiring frequent re direction, verbal cues for safety and verbal cues for correct technique  Pt performed grooming supine in bed with HOB elevated requiring SUP/set up and VCs for redirection to task, UB dressing with MAX A  Pt incontinent of bowels during session; MOD A x2 to roll and assist of 3rd for bj care  OTR to extend goals as goals are still appropriate, with the following change: Pt will complete grooming task MOD IND after set up to increase independence in functional tasks  OT to continue to follow 3-5x/week for 14 days  Patient continues to be functioning below baseline level, occupational performance remains lirmited secondary to factors listed above and increased risk for falls and injury     From OT standpoint, recommendation at time of d/c would be Short Term Rehab  Patient to benefit from continued Occupational Therapy treatment while in the hospital to address deficits as defined above and maximize level of functional independence with ADLs and functional mobility        OT Discharge Recommendation: Short Term Rehab  OT - OK to Discharge: Yes(when medically appropriate)

## 2019-02-21 NOTE — PROGRESS NOTES
[address] Internal Medicine Progress Note  Patient: Terrence Hawkins 60 y o  male   MRN: [id number]  PCP: Tavo Eugene DO  [facility] Encounter: [id number]  Date Of Visit: 02/21/19    Assessment:    Principal Problem:    Staphylococcus aureus bacteremia  Active Problems:    Increased BMI    Stress-induced cardiomyopathy    Acute respiratory failure with hypoxia ([facility] [address] )    History of aortic valve replacement with bioprosthetic valve    Atrial fibrillation (HCC)    Transaminitis    Anemia    Leukocytosis    Cerebrovascular accident ([facility] [address] )    PATRICK (acute kidney injury) ([facility] [address] )    Hypervolemia    Acute systolic CHF (congestive heart failure) ([facility])    Toxic metabolic encephalopathy    Skin rash    Chronic kidney disease-mineral and bone disorder    Hyponatremia      Plan:     1  MSSA bacteremia, negative CHON, negative repeat BC, per ID, plan for 6 weeks of antibiotic through 3/10     2  Recent shock, likely multifactorial,  cardiogenic/septic, resolved     3  PATRICK secondary to ATN,  now HD dependent on MWF, nephrology is following,  patient pulled HD catheter out, status post catheter placement by IR again on 2/18/2019  Prema Tijerina is to due to our sessions today and tomorrow and then switch to Tuesday Thursday Saturday schedule for dialysis on discharge      4  Acute systolic CHF/cardiomyopathy with EF 30%,  cardiology is following, on Lopressor and Demadex, volume status managed by HD  Repeat Echo shows EF 55% with Gr 2 diastolic dysfunction     5  S/p bioprosthetic AVR, negative CHON for vegetation     6  Acute hypoxic respiratory failure secondary to above, resolved,  s/p extubation, continue supplemental oxygen to keep O2 saturation greater than 90 percent   Wean off as tolerated      7  New onset AFib/ A flutter, with fluctuating heart rate, Toprol XL  was increased 2/18, cont Coumadin  Amiodarone stopped      8  Type 2 MI- in the setting of atrial fibrillation      9   Toxic metabolic encephalopathy with abnormal head CT scan, evaluated by Neurology,  likely ischemic event bilaterally,  recommendation to repeat head CT scan in 2 weeks, continue aspirin and  Statin, bilateral carotid ultrasound without significant stenosis  Mental status stable and back to baseline as per patient and wife yesterday      10  Anemia of chronic disease, with drop in hemoglobin on 2/18/2019 requiring transfusion    hemoglobin stable       11  Morbid obesity     12  Skin rash, possibly  Xerosis/contact irritation, continue hydrocortisone cream, p r n  Leigh Coler, monitor  Amiodarone stopped by Cardiology      13  Abdominal distention, patient feeling better today, ultrasound abdomen and negative for ascites       14  Loose stools, 2 episodes since morning  Stop stool softeners and monitor        ADDENDUM:  Called by RN that patient having bloody bowel movement this afternoon  Will check stat H&H and type and screen  Will stop Coumadin  Follow up on H&H and transfuse as needed  Also consult GI  Tried calling patient's wife to update her  Unable to reach her  Also unable to leave a message  Will try again later  Will make patient level 2 step-down          VTE Pharmacologic Prophylaxis:   Pharmacologic: Warfarin (Coumadin)  Mechanical VTE Prophylaxis in Place: Yes   Patient Centered Rounds: I have performed bedside rounds with nursing staff today    Discussions with Specialists or Other Care Team Provider: cardio   Education and Discussions with Family / Patient: pt   Tried calling wife - left message   Time Spent for Care: 30 minutes   More than 50% of total time spent on counseling and coordination of care as described above    Current Length of Stay: 28 day(s)   Current Patient Status: Inpatient   Certification Statement: The patient will continue to require additional inpatient hospital stay due Critical access hospital  Discharge Plan / Estimated Discharge Date: possibly Friday pending cardiology and renal clearance  Code Status: Level 1 - Full Code          Subjective:   Pt seen and examined by me this morning  Pt complained of diarrhea since this morning  He is on stool softeners  he denies any other complaints  Objective:     Vitals:   Temp (24hrs), Av 1 °F (36 7 °C), Min:97 °F (36 1 °C), Max:98 6 °F (37 °C)    Temp:  [97 °F (36 1 °C)-98 6 °F (37 °C)] 98 6 °F (37 °C)  HR:  [60-97] 75  Resp:  [16-22] 20  BP: ()/(41-63) 91/55  SpO2:  [87 %-100 %] 87 %  Body mass index is 54 01 kg/m²  Input and Output Summary (last 24 hours): Intake/Output Summary (Last 24 hours) at 2019 1546  Last data filed at 2019 1336  Gross per 24 hour   Intake 1340 ml   Output 260 ml   Net 1080 ml       Physical Exam:     Physical Exam    Constitutional: Pt appears well-developed and well-nourished  Not in any acute distress  Morbidly obese  Cardiovascular: Normal rate, irregular rhythm, normal heart sounds   Exam reveals no gallop and no friction rub   No murmur heard  Pulmonary/Chest: Effort normal and breath sounds normal - decreased air entry at bases bilaterally   No respiratory distress  Pt has no wheezes or rales  Abdominal: Soft  Non-distended, Non-tender  Bowel sounds are normal    Musculoskeletal: Normal range of motion  Neurological:  Awake and alert   Moving all extremities spontaneously  Psychiatric: normal mood and affect           Additional Data:     Labs:    Results from last 7 days   Lab Units 19  0831  19  0909 19  0704   WBC Thousand/uL 11 91*  --  10 54* 9 42   HEMOGLOBIN g/dL 7 4*   < > 7 0* 7 5*   HEMATOCRIT % 24 1*   < > 23 1* 25 3*   PLATELETS Thousands/uL 226  --  238 239   NEUTROS PCT %  --   --   --  81*   LYMPHS PCT %  --   --   --  5*   LYMPHO PCT %  --   --  1*  --    MONOS PCT %  --   --   --  7   MONO PCT %  --   --  3*  --    EOS PCT %  --   --  4 5    < > = values in this interval not displayed       Results from last 7 days   Lab Units 02/15/19  1358   POTASSIUM mmol/L 4 2   CHLORIDE mmol/L 93*   CO2 mmol/L 25   BUN mg/dL 93*   CREATININE mg/dL 7 14*   CALCIUM mg/dL 8 2*   ALK PHOS U/L 72   ALT U/L 7*   AST U/L 21     Results from last 7 days   Lab Units 02/21/19  0511   INR  1 89*       * I Have Reviewed All Lab Data Listed Above  * Additional Pertinent Lab Tests Reviewed: [address] Admission Reviewed    Imaging:    Imaging Reports Reviewed Today Include:   Imaging Personally Reviewed by Myself Includes:      Recent Cultures (last 7 days):           Last 24 Hours Medication List:     Current Facility-Administered Medications:  acetaminophen 650 mg Oral Q6H PRN Yamila Olea MD    aspirin 81 mg Oral Daily Percilla Files, DO    atorvastatin 40 mg Oral Daily With [employer], DO    bisacodyl 10 mg Rectal Daily PRN Brett Kaur PA-C    calcium acetate 1,334 mg Oral TID With Meals Felicia Quintanilla, DO    diphenhydrAMINE 25 mg Oral Q6H PRN Percilla Files, DO    guaiFENesin 600 mg Oral Q12H [address] [place]    hydrocortisone  Topical BID Percilla Files, DO    HYDROmorphone 1 mg Intravenous Q3H PRN Lidya Martinez    [START ON 2/23/2019] iron sucrose 100 mg Intravenous Once per day on Tue Thu Sat Pegge Milks, DO    metoprolol 5 mg Intravenous Q6H PRN Savannah Oro, DO    metoprolol succinate 100 mg Oral Q12H Sukhjinder Cutjacinto, DO    nystatin  Topical BID Brett Kaur PA-C    oxyCODONE 10 mg Oral Q4H PRN Yamila Olea MD    polyvinyl alcohol 1 drop Both Eyes Q3H PRN Emma Hoover PA-C    QUEtiapine 25 mg Oral HS Emma Hoover PA-C    torsemide 80 mg Oral Daily Felicia Quintanilla, DO    vancomycin 10 mg/kg (Adjusted) Intravenous After Dialysis Alyssa Carey MD Last Rate: Stopped (02/21/19 1041)   warfarin 2 mg Oral Daily (warfarin) Percilla Files, DO         Today, Patient Was Seen By: Shannon Roy MD    ** Please Note: This note has been constructed using a voice recognition system   **

## 2019-02-21 NOTE — PROGRESS NOTES
Cardiology Progress Note - Ginny Bellamy 60 y o  male MRN: [id number]    Unit/Bed#: [facility] 507-01 Encounter: [id number]  Assessment and plan  1  Mixed shock primarily sepsis  2  Normalized LV function 55%  3  Bioprosthetic AVR  4  Type 2 myocardial infarction without ST elevation  5  Acute kidney injury now on dialysis  6  Atrial flutter    Recommendations:  Overall patient slowly improving  He had a short dialysis treatment today secondary to hypotension  Think a lot of his fluid is 3rd spacing  Continue with nutritional support he was given albumin on dialysis  Case discussed with Nephrology  For now continue current medications heart rate is controlled  Subjective:    No significant events overnight  A little more confused today  Denies chest pain or dyspnea  Bilateral lower extremity pain noted      little more Objective:   Vitals: Blood pressure 91/55, pulse 75, temperature 98 6 °F (37 °C), resp  rate 20, height 5' 9" (1 753 m), weight (!) 166 kg (365 lb 11 9 oz), SpO2 (!) 87 %  , Body mass index is 54 01 kg/m² ,   Orthostatic Blood Pressures      Most Recent Value   Blood Pressure  91/55 filed at 02/21/2019 1044   Patient Position - Orthostatic VS  Lying filed at 02/21/2019 6614         Systolic (64DAW), PRL:96 , Min:82 , EMR:938     Diastolic (53XBB), LRV:68, Min:41, Max:63      Intake/Output Summary (Last 24 hours) at 2/21/2019 1308  Last data filed at 2/21/2019 0920  Gross per 24 hour   Intake 1340 ml   Output 260 ml   Net 1080 ml     Weight (last 2 days)     Date/Time   Weight    02/21/19 0535   166 (365 74)  (Abnormal)     02/20/19 0600   161 (354 94)  (Abnormal)     02/20/19 0545   161 (354 94)  (Abnormal)     02/19/19 0531   155 (342 37)  (Abnormal)                 Telemetry Review: No significant arrhythmias seen on telemetry review  EKG personally reviewed by Haylee Lewis DO  Physical Exam   Constitutional: He is oriented to person, place, and time  He appears well-nourished   No distress  HENT:   Head: Atraumatic  Eyes: Pupils are equal, round, and reactive to light  Conjunctivae are normal    Neck: Neck supple  Cardiovascular: Normal rate and regular rhythm  Exam reveals no friction rub  Murmur heard  Pulmonary/Chest: Effort normal  No respiratory distress  He has decreased breath sounds  He has no wheezes  He has no rhonchi  He has no rales  Abdominal: Bowel sounds are normal  He exhibits no distension  There is no tenderness  There is no rebound  Musculoskeletal: He exhibits edema  He exhibits no deformity  Neurological: He is alert and oriented to person, place, and time  No cranial nerve deficit  Skin: Skin is warm and dry  No erythema  Nursing note and vitals reviewed  Laboratory Results:        CBC with diff:   Results from last 7 days   Lab Units 02/20/19  0831 02/19/19  1724 02/18/19  0909 02/17/19  0704   WBC Thousand/uL 11 91*  --  10 54* 9 42   HEMOGLOBIN g/dL 7 4* 7 6* 7 0* 7 5*   HEMATOCRIT % 24 1* 24 6* 23 1* 25 3*   MCV fL 87  --  88 87   PLATELETS Thousands/uL 226  --  238 239   MCH pg 26 7*  --  26 3* 25 8*   MCHC g/dL 30 7*  --  29 9* 29 6*   RDW % 16 9*  --  17 3* 17 2*   MPV fL 10 0  --  10 8 11 3   NRBC AUTO /100 WBCs  --   --  0 0         CMP:  Results from last 7 days   Lab Units 02/15/19  1358   POTASSIUM mmol/L 4 2   CHLORIDE mmol/L 93*   CO2 mmol/L 25   BUN mg/dL 93*   CREATININE mg/dL 7 14*   CALCIUM mg/dL 8 2*   AST U/L 21   ALT U/L 7*   ALK PHOS U/L 72   EGFR ml/min/1 73sq m 8         BMP:  Results from last 7 days   Lab Units 02/15/19  1358   POTASSIUM mmol/L 4 2   CHLORIDE mmol/L 93*   CO2 mmol/L 25   BUN mg/dL 93*   CREATININE mg/dL 7 14*   CALCIUM mg/dL 8 2*       BNP: No results for input(s): BNP in the last 72 hours      Magnesium:         Coags:   Results from last 7 days   Lab Units 02/21/19  0511 02/20/19  0831 02/18/19  0909 02/17/19  1027 02/16/19  0443 02/15/19  1358   INR  1 89* 1 49* 1 73* 4 27* 3 73* 3 29*       TSH: Hemoglobin A1C         Lipid Profile:         Cardiac testing:   Results for orders placed during the hospital encounter of 19   Echo complete with contrast if indicated    Narrative [address]  [address], [address]  [phone]    Transthoracic Echocardiogram  2D, Doppler, and Color Doppler    Study date:  2019    Patient: Tony Rider  MR number: [id number]  Account number: [de-identified]  : 1960  Age: 60 years  Gender: Male  Status: Inpatient  Location: Bedside  Height: 72 in  Weight: 339 lb  BP: 89/ 54 mmHg    Indications: chest pain    Diagnoses: R07 9 - Chest pain, unspecified    Sonographer:  Bard Yadav RDCS, CCT  Primary Physician:  Author Seferino DO  Referring Physician:  Debria Hatchet, DO  Group:  [facility]  Interpreting Physician:  Quique Thompson DO    SUMMARY    PROCEDURE INFORMATION:  This was a technically difficult study despite the administration of echo contrast     LEFT VENTRICLE:  Systolic function was markedly reduced  Ejection fraction was estimated to be 30 %  There was severe diffuse hypokinesis with no obvious identifiable regional variations  Wall thickness was moderately increased  Concentric hypertrophy was present  VENTRICULAR SEPTUM:  There was dyssynergic motion  RIGHT VENTRICLE:  The ventricle was mildly dilated  Systolic function was moderately to markedly reduced  LEFT ATRIUM:  The atrium was mildly dilated  RIGHT ATRIUM:  The atrium was mildly dilated  AORTIC VALVE:  A bioprosthesis was present  It was not well visualized  Mean transvalvular gradient 13 mmHg  TRICUSPID VALVE:  There was mild regurgitation  PERICARDIUM:  A small, partially loculated pericardial effusion was identified along the left ventricular free wall  HISTORY: PRIOR HISTORY: Aortic valve prosthesis    PROCEDURE: The procedure was performed at the bedside  This was a routine study   The transthoracic approach was used  The study included complete 2D imaging, complete spectral Doppler, and color Doppler  The heart rate was 80 bpm, at the  start of the study  Intravenous contrast (Definity solution [1 3 ml Definity/8 7ml normal saline solution]) was administered  Intravenous contrast (Definity solution [1 3 ml Definity/8 7ml normal saline solution]) was administered to  opacify the left ventricle and enhance Doppler signals  Echocardiographic views were limited due to low windows and patient on mechanical ventilator  This was a technically difficult study despite the administration of echo contrast     LEFT VENTRICLE: Size was normal  Systolic function was markedly reduced  Ejection fraction was estimated to be 30 %  There was severe diffuse hypokinesis with no obvious identifiable regional variations  Wall thickness was moderately  increased  Concentric hypertrophy was present  DOPPLER: Normal sinus rhythm was absent  The study was not technically sufficient to allow evaluation of LV diastolic function  VENTRICULAR SEPTUM: There was dyssynergic motion  RIGHT VENTRICLE: The ventricle was mildly dilated  Systolic function was moderately to markedly reduced  LEFT ATRIUM: The atrium was mildly dilated  RIGHT ATRIUM: The atrium was mildly dilated  MITRAL VALVE: Not well visualized  DOPPLER: The transmitral velocity was within the normal range  There was no evidence for stenosis  There was no significant regurgitation  AORTIC VALVE: A bioprosthesis was present  It was not well visualized  Mean transvalvular gradient 13 mmHg  DOPPLER: There was no significant regurgitation  TRICUSPID VALVE: The valve structure was normal  There was normal leaflet separation  DOPPLER: The transtricuspid velocity was within the normal range  There was no evidence for stenosis  There was mild regurgitation   The tricuspid jet  envelope definition was inadequate for estimation of RV systolic pressure  PULMONIC VALVE: Leaflets exhibited normal thickness, no calcification, and normal cuspal separation  DOPPLER: The transpulmonic velocity was within the normal range  There was no significant regurgitation  PERICARDIUM: A small, partially loculated pericardial effusion was identified along the left ventricular free wall  The pericardium was normal in appearance  AORTA: The root exhibited normal size  SYSTEM MEASUREMENT TABLES    2D  %FS: 15 83 %  Ao Diam: 3 09 cm  EDV(Teich): 221 88 ml  EF(Teich): 32 54 %  ESV(Teich): 149 69 ml  IVSd: 1 59 cm  LA Diam: 5 18 cm  LVIDd: 6 58 cm  LVIDs: 5 54 cm  LVPWd: 1 43 cm  SV(Teich): 72 19 ml    CW  AV Env  Ti: 233 56 ms  AV VTI: 49 77 cm  AV Vmax: 2 64 m/s  AV Vmax: 2 67 m/s  AV Vmean: 2 13 m/s  AV maxP 95 mmHg  AV maxP 53 mmHg  AV meanP 73 mmHg    PW  LVOT Env  Ti: 215 22 ms  LVOT VTI: 11 41 cm  LVOT Vmax: 0 59 m/s  LVOT Vmax: 0 7 m/s  LVOT Vmean: 0 52 m/s  LVOT maxP 78 mmHg  LVOT maxP mmHg  LVOT meanP 24 mmHg  MV E Deep: 1 01 m/s    [employer] Accredited Echocardiography Laboratory    Prepared and electronically signed by    Mine Elaine DO  Signed 2019 10:14:55       Results for orders placed during the hospital encounter of 19   CHON    Narrative [facility]  [facility] [address] [facility]  [phone]    Transesophageal Echocardiogram  2D, Doppler, and Color Doppler    Study date:  2019    Patient: Chucky Gann  MR number: [id number]  Account number: [de-identified]  : 1960  Age: 60 years  Gender: Male  Status: Inpatient  Location: Bedside  Height: 69 in  Weight: 319 lb  BP: 101/ 54 mmHg    Indications: Bacteremia      Diagnoses: R78 81 - Bacteremia    Sonographer:  Leila Infante RDCS  Interpreting Physician:  John Abel DO  Primary Physician:  Jenaro Lester DO  Referring Physician:  Lissy Marroquin DO  Group:  [facility]  Cardiology Fellow:  Kyrie Rivers MD    IMPRESSIONS:  There was no echocardiographic evidence for valvular vegetation  SUMMARY    LEFT VENTRICLE:  Systolic function was moderately reduced  Ejection fraction was estimated to be 40 %  There was moderate diffuse hypokinesis  Wall thickness was mildly increased  There was mild concentric hypertrophy  RIGHT VENTRICLE:  The size was normal   Systolic function was mildly reduced  LEFT ATRIUM:  The atrium was mildly dilated  ATRIAL SEPTUM:  There was a small patent foramen ovale with left to right shunt identified by color Doppler  RIGHT ATRIUM:  The atrium was mildly dilated  MITRAL VALVE:  There was trace regurgitation  There was no echocardiographic evidence of vegetation  AORTIC VALVE:  A bioprosthesis was present  It exhibited normal function  There was no echocardiographic evidence of vegetation  TRICUSPID VALVE:  There was mild regurgitation  There was no echocardiographic evidence of vegetation  HISTORY: PRIOR HISTORY: Aortic valve replacement, NSTEMI, Cardiomyopathy, Acute kidney injury  PROCEDURE: The procedure was performed at the bedside  This was a routine study  The risks and alternatives of the procedure were explained to the patient's next of kin and informed consent was obtained  The transesophageal approach was  used  The study included complete 2D imaging, complete spectral Doppler, and color Doppler  The heart rate was 113 bpm, at the start of the study  An adult omniplane probe was inserted by the cardiology fellow under direct supervision of  the attending cardiologist  Intubated with ease  One intubation attempt(s)  There was no blood detected on the probe  Image quality was adequate  There were no complications during the procedure  MEDICATIONS: Sedation administered by  bedside nurse  LEFT VENTRICLE: Size was normal  Systolic function was moderately reduced   Ejection fraction was estimated to be 40 %  There was moderate diffuse hypokinesis  Wall thickness was mildly increased  There was mild concentric hypertrophy  DOPPLER: The study was not technically sufficient to allow evaluation of LV diastolic function  RIGHT VENTRICLE: The size was normal  Systolic function was mildly reduced  Wall thickness was normal     LEFT ATRIUM: The atrium was mildly dilated  No thrombus was identified  APPENDAGE: The appendage was small  No thrombus was identified  DOPPLER: The function was normal (normal emptying velocity)  ATRIAL SEPTUM: There was a small patent foramen ovale with left to right shunt identified by color Doppler  RIGHT ATRIUM: The atrium was mildly dilated  No thrombus was identified  MITRAL VALVE: Valve structure was normal  There was normal leaflet separation  There was no echocardiographic evidence of vegetation  DOPPLER: There was trace regurgitation  AORTIC VALVE: A bioprosthesis was present  It exhibited normal function  There was no echocardiographic evidence of vegetation  TRICUSPID VALVE: The valve structure was normal  There was normal leaflet separation  There was no echocardiographic evidence of vegetation  DOPPLER: There was mild regurgitation  PULMONIC VALVE: Leaflets exhibited normal thickness, no calcification, and normal cuspal separation  There was no echocardiographic evidence of vegetation  DOPPLER: There was no significant regurgitation  PERICARDIUM: There was no pericardial effusion seen in the images obtained  AORTA: The root exhibited normal size  There was no atheroma  There was no evidence for dissection  There was no evidence for aneurysm  [address] [facility] Laboratory    Prepared and electronically signed by    Bessie Torres DO  Signed 25-Jan-2019 14:01:14       No results found for this or any previous visit  No results found for this or any previous visit      Meds/Allergies   all current active meds have been reviewed  Medications Prior to Admission   Medication    amLODIPine (NORVASC) 5 mg tablet    ascorbic acid (VITAMIN C) 500 MG tablet    aspirin (ECOTRIN) 325 mg EC tablet    fluticasone (FLONASE) 50 mcg/act nasal spray    loratadine (CLARITIN) 10 mg tablet    metoprolol tartrate (LOPRESSOR) 100 mg tablet    potassium chloride (K-DUR,KLOR-CON) 20 mEq tablet    pravastatin (PRAVACHOL) 40 mg tablet    torsemide (DEMADEX) 20 mg tablet          Assessment:  Principal Problem:    Staphylococcus aureus bacteremia  Active Problems:    Increased BMI    Stress-induced cardiomyopathy    Acute respiratory failure with hypoxia (HCC)    History of aortic valve replacement with bioprosthetic valve    Atrial fibrillation (HCC)    Transaminitis    Anemia    Leukocytosis    Cerebrovascular accident ([facility] [address] )    PATRICK (acute kidney injury) ([facility] [address] )    Hypervolemia    Acute systolic CHF (congestive heart failure) ([facility])    Toxic metabolic encephalopathy    Skin rash    Chronic kidney disease-mineral and bone disorder    Hyponatremia

## 2019-02-21 NOTE — HEMODIALYSIS
Patient's BP was 85/41 at the start of HD treatment  Albumin 25g given, but SBP continued in the 80s  Patient c/o feeling dizzy and diaphoretic  Treatment ended early per Dr Kesha Nickerson  Patient received 50 minutes of HD without fluid removal   300 ml ns rinse back given and last BP is 96/47  Patient states he is feeling better  Report given to primary RNMomo  Vancomycin 1,000mg started and Momo Hoffman is aware to stop infusion when complete

## 2019-02-21 NOTE — PROGRESS NOTES
NEPHROLOGY PROGRESS NOTE   Yulissa Mayers 60 y o  male MRN: [id number]  Unit/Bed#: [facility] 507-01 Encounter: [id number]      ASSESSMENT & PLAN:    1  Acute kidney injury secondary to ATN will be in acute as an outpatient Tuesday Thursday Saturday  -patient seen on dialysis at approximately 9:10 a m   -diarrhea, blood pressure is low today, given albumin 25 g and rinsed back blood pressure better a 95 systolic rate will terminate dialysis early today  -continue Tuesday Thursday Saturday schedule    2  Access: Tunneled dialysis catheter replaced by IR  -tunneled dialysis catheter remains in place and in use    3  MSSA bacteremia on 6 weeks of antibiotics  -continues on vancomycin patient had development of a rash  -will be checking trough levels tomorrow has days or changing for Vancomycin dosing  -infectious Disease is following  -would consider ruling out C diff    4  Bioprosthetic valve  -continue to monitor cardiac function    5  Atrial fibrillation  -currently heart rates are acceptable    6  Anemia of chronic kidney disease  -based on most recent studies unclear whether this was an ischemic event, acute CVA regarding his encephalopathy therefore will hold off on Epogen  -continue Venofer on dialysis    7  Ischemic cardiomyopathy with an EF 30%  -continue ultrafiltration as tolerated    8   CKD bone mineral disease  -continue PhosLo for hyperphosphatemia    9  atrial fibrillation  -on Coumadin for anticoagulation       SUBJECTIVE:    Patient seen complaining of some dizziness on dialysis has had 4 episodes of diarrhea today      OBJECTIVE:  Current Weight: Weight - Scale: (!) 166 kg (365 lb 11 9 oz)  Vitals:    02/21/19 0840   BP: (!) 88/42   Pulse: 63   Resp:    Temp:    SpO2:        Intake/Output Summary (Last 24 hours) at 2/21/2019 0947  Last data filed at 2/21/2019 0830  Gross per 24 hour   Intake 1340 ml   Output 2000 ml   Net -660 ml       General: conscious, cooperative, in not acute distress  Eyes: conjunctivae pink, anicteric sclerae  ENT: lips and mucous membranes moist  Neck: supple, no JVD  Chest: clear breath sounds bilateral, no crackles, ronchus or wheezings, tunneled dialysis catheter  CVS: distinct S1 & S2, normal rate, regular rhythm  Abdomen:  Soft nontender nondistended  Extremities:  2+ edema  Skin: no rash  Neuro: awake, alert, oriented        Medications:    Current Facility-Administered Medications:     acetaminophen (TYLENOL) tablet 650 mg, 650 mg, Oral, Q6H PRN, Clement Pedraza MD, 650 mg at 02/20/19 2007    aspirin (ECOTRIN LOW STRENGTH) EC tablet 81 mg, 81 mg, Oral, Daily, Eulice Erichsen, DO, 81 mg at 02/20/19 1151    atorvastatin (LIPITOR) tablet 40 mg, 40 mg, Oral, Daily With Lio Balderas, DO, 40 mg at 02/20/19 1603    bisacodyl (DULCOLAX) rectal suppository 10 mg, 10 mg, Rectal, Daily PRN, Prashanth Kiser PA-C    calcium acetate (PHOSLO) capsule 1,334 mg, 1,334 mg, Oral, TID With Meals, Gerardorupali Nieves Quintanilla, DO, 1,334 mg at 02/20/19 1603    diphenhydrAMINE (BENADRYL) tablet 25 mg, 25 mg, Oral, Q6H PRN, Eulice Erichsen, DO, 25 mg at 02/20/19 2206    guaiFENesin (MUCINEX) 12 hr tablet 600 mg, 600 mg, Oral, Q12H [facility] Michele Umanzor PA-C, 600 mg at 02/20/19 2006    hydrocortisone 1 % cream, , Topical, BID, Eulice Erichsen, DO    HYDROmorphone (DILAUDID) injection 1 mg, 1 mg, Intravenous, Q3H PRN, Lidya Martinez, 1 mg at 02/11/19 1600    [START ON 2/23/2019] iron sucrose (VENOFER) 100 mg in sodium chloride 0 9 % 100 mL IVPB, 100 mg, Intravenous, Once per day on Tue Thu Sat, Dimitry Barboza DO    metoprolol (LOPRESSOR) injection 5 mg, 5 mg, Intravenous, Q6H PRN, Akila Oro DO, 5 mg at 02/11/19 1559    metoprolol succinate (TOPROL-XL) 24 hr tablet 100 mg, 100 mg, Oral, Q12H, Sukhjinder Perry DO, 100 mg at 02/20/19 2006    nystatin (MYCOSTATIN) powder, , Topical, BID, Prashanth Kiser PA-C    oxyCODONE (ROXICODONE) immediate release tablet 10 mg, 10 mg, Oral, Q4H PRN, Carrington Pompa MD, 10 mg at 02/21/19 0755    polyethylene glycol (MIRALAX) packet 17 g, 17 g, Oral, Daily, KATHY Mathias, 17 g at 02/17/19 1036    polyvinyl alcohol (LIQUIFILM TEARS) 1 4 % ophthalmic solution 1 drop, 1 drop, Both Eyes, Q3H PRN, Etelvina Smyth PA-C, 1 drop at 02/13/19 1724    QUEtiapine (SEROquel) tablet 25 mg, 25 mg, Oral, HS, Etelvina Smyth PA-C, 25 mg at 02/20/19 2206    senna-docusate sodium (SENOKOT S) 8 6-50 mg per tablet 1 tablet, 1 tablet, Oral, BID, Mickeal Chip, DO, 1 tablet at 02/19/19 1732    torsemide (DEMADEX) tablet 80 mg, 80 mg, Oral, Daily, Irma Quintanilla, , 80 mg at 02/20/19 1150    vancomycin (VANCOCIN) IVPB (premix) 1,000 mg, 10 mg/kg (Adjusted), Intravenous, After Dialysis, Jomar Almaguer MD, Last Rate: 200 mL/hr at 02/21/19 0925, 1,000 mg at 02/21/19 7002    warfarin (COUMADIN) tablet 2 mg, 2 mg, Oral, Daily (warfarin), Mickeal Chip, DO, 2 mg at 02/20/19 1718    Invasive Devices:      Lab Results:   Results from last 7 days   Lab Units 02/20/19  0831 02/19/19  1724 02/18/19  0909 02/17/19  0704 02/15/19  1358   WBC Thousand/uL 11 91*  --  10 54* 9 42  --    HEMOGLOBIN g/dL 7 4* 7 6* 7 0* 7 5*  --    HEMATOCRIT % 24 1* 24 6* 23 1* 25 3*  --    PLATELETS Thousands/uL 226  --  238 239  --    POTASSIUM mmol/L  --   --   --   --  4 2   CHLORIDE mmol/L  --   --   --   --  93*   CO2 mmol/L  --   --   --   --  25   BUN mg/dL  --   --   --   --  93*   CREATININE mg/dL  --   --   --   --  7 14*   CALCIUM mg/dL  --   --   --   --  8 2*   ALK PHOS U/L  --   --   --   --  72   ALT U/L  --   --   --   --  7*   AST U/L  --   --   --   --  21       Previous work up:  Please see previous notes

## 2019-02-21 NOTE — PROGRESS NOTES
Pt  Had 2 episodes of diarrhea, rerported to dr Merissa Edmondson  Does not want specimen sent for c-diff at this time, will monitor  Pt  Also noted to be more confused today, cardiology and dr Merissa Edmondson aware of same

## 2019-02-21 NOTE — OCCUPATIONAL THERAPY NOTE
[address] Progress Note     Patient Name: Georges Recio  [id number] Date: 2/21/2019  Problem List  Patient Active Problem List   Diagnosis    Aortic stenosis, mild    Essential hypertension    Hyperlipidemia    Left bundle branch block    Increased BMI    Murmur    Osteoarthritis of both knees    Pericardial disease    Sciatica    Age-related cataract of right eye    Venous insufficiency    Bilateral leg edema    Stress-induced cardiomyopathy    Acute respiratory failure with hypoxia ([facility] [address] )    History of aortic valve replacement with bioprosthetic valve    Atrial fibrillation (HCC)    Staphylococcus aureus bacteremia    Transaminitis    Anemia    Leukocytosis    Cerebrovascular accident ([facility] [address] )    PATRICK (acute kidney injury) ([facility] [address] )    Hypervolemia    Acute systolic [facility]congestive heart failure) [facility])    Toxic metabolic encephalopathy    Skin rash    Chronic kidney disease-mineral and bone disorder    Hyponatremia             02/21/19 1536   Restrictions/Precautions   Weight Bearing Precautions Per Order No   Other Precautions O2;Fall Risk;Pain;Telemetry   General   Response to Previous Treatment Patient with no complaints from previous session   Lifestyle   Autonomy I ADLS, IADLS, transfers and functional mobility PTA   Reciprocal Relationships Pt lives w/ spouse and mother in law   Service to Others Pt works full time as a     Intrinsic Gratification pt likes trains   Pain Assessment   Pain Assessment FLACC   Pain Location Abdomen   Pain Rating: FLACC (Rest) - Face 0   Pain Rating: FLACC (Rest) - Legs 0   Pain Rating: FLACC (Rest) - Activity 0   Pain Rating: FLACC (Rest) - Cry 0   Pain Rating: FLACC (Rest) - Consolability 0   Score: FLACC (Rest) 0   Pain Rating: FLACC (Activity) - Face 1   Pain Rating: FLACC (Activity) - Legs 0   Pain Rating: FLACC (Activity) - Activity 0   Pain Rating: FLACC (Activity) - Cry 1   Pain Rating: FLACC (Activity) - Consolability 0   Score: FLACC (Activity) 2   ADL   Grooming Assistance 5  Supervision/Setup   Grooming Deficit Setup;Verbal cueing;Supervision/safety; Increased time to complete  (VCs to redirect to activity)   Grooming Comments Washed/dried face supine in bed w/ HOB elevated   UB Dressing Assistance 2  Maximal Assistance   UB Dressing Deficit Increased time to complete; Thread RUE; Thread LUE;Pull around back   [address]  Total Assistance   LB Dressing Deficit Don/doff R sock; Don/doff L sock   Toileting Assistance  1  Total Assistance   Toileting Deficit Perineal hygiene;Clothing management up;Clothing management down   Toileting Comments Pt incontinent of bowels during session; MOD Ax2 to roll and MAX A of a 3rd for bj care   Bed Mobility   Rolling R 3  Moderate assistance   Additional items Assist x 2;Bedrails; Increased time required;Verbal cues   Rolling L 3  Moderate assistance   Additional items Assist x 2;Bedrails; Increased time required;Verbal cues   ROM- Right Upper Extremities   R Shoulder AROM   R Elbow AROM   R Wrist AROM   R Hand AROM   R Position Supine   R Weight/Reps/Sets 10 reps   ROM - Left Upper Extremities    L Shoulder AAROM   L Elbow AROM   L Wrist AAROM   L Hand AROM   L Position Supine   L Weight/Reps/Sets 10 reps   Cognition   Overall Cognitive Status Impaired   Arousal/Participation Cooperative;Responsive   Attention Attends with cues to redirect   Orientation Level Oriented to person;Oriented to place; Disoriented to time;Disoriented to situation  (Pt responded season was "summer" when given choices)   Memory Decreased recall of biographical information;Decreased short term memory;Decreased recall of recent events;Decreased recall of precautions   Following Commands Follows one step commands without difficulty  (Followed ~75% of one step commands w/ increased time)   Comments Pt reporting "I'm confused today" throughout session   Activity Tolerance   Activity Tolerance Patient limited by fatigue;Patient limited by pain   Medical Staff Made Aware Spoke to RN - pt appropriate to be seen; nursing assist for bed mobility/bj care   Assessment   Assessment Patient participated in Skilled OT session this date with interventions consisting of bed mobility, ADL re-training, cognitive reorientation, UE ROM  Patient agreeable to OT treatment session, upon arrival patient was found supine in bed  Patient requiring frequent re direction, verbal cues for safety and verbal cues for correct technique  Pt performed grooming supine in bed with HOB elevated requiring SUP/set up and VCs for redirection to task, UB dressing with MAX A  Pt incontinent of bowels during session; MOD A x2 to roll and assist of 3rd for bj care  OTR to extend goals as goals are still appropriate, with the following change: Pt will complete grooming task MOD IND after set up to increase independence in functional tasks  OT to continue to follow 3-5x/week for 14 days  Patient continues to be functioning below baseline level, occupational performance remains lirmited secondary to factors listed above and increased risk for falls and injury  From OT standpoint, recommendation at time of d/c would be Short Term Rehab  Patient to benefit from continued Occupational Therapy treatment while in the hospital to address deficits as defined above and maximize level of functional independence with ADLs and functional mobility  Plan   Treatment Interventions ADL retraining;Functional transfer training;UE strengthening/ROM; Cognitive reorientation;Patient/family training   Goal Expiration Date 03/07/19   Treatment Day 5   OT Frequency 3-5x/wk   Recommendation   OT Discharge Recommendation Short Term Rehab   OT - OK to Discharge Yes  (when medically appropriate)   Barthel Index   Feeding 10   Bathing 0   Grooming Score 5   Dressing Score 0   Bladder Score 0   Bowels Score 5   Toilet Use Score 0   Transfers (Bed/Chair) Score 0 Mobility (Level Surface) Score 0   Stairs Score 0   Barthel Index Score 20       Dariela Toledo, OT

## 2019-02-21 NOTE — PLAN OF CARE
Problem: PHYSICAL THERAPY ADULT  Goal: Performs mobility at highest level of function for planned discharge setting  See evaluation for individualized goals  Description  Treatment/Interventions: LE strengthening/ROM, Therapeutic exercise, Endurance training, Patient/family training, Cognitive reorientation, Equipment eval/education, Bed mobility, Spoke to advanced practitioner, Spoke to case management, Spoke to nursing  Equipment Recommended:  (TBD- may benefit from [facility] bed)       See flowsheet documentation for full assessment, interventions and recommendations     Outcome: Progressing

## 2019-02-22 ENCOUNTER — APPOINTMENT (INPATIENT)
Dept: RADIOLOGY | Facility: HOSPITAL | Age: 59
DRG: 871 | End: 2019-02-22
Payer: COMMERCIAL

## 2019-02-22 PROBLEM — D62 ACUTE BLOOD LOSS ANEMIA: Status: ACTIVE | Noted: 2019-01-29

## 2019-02-22 PROBLEM — N17.9 ACUTE ON CHRONIC RENAL FAILURE (HCC): Status: ACTIVE | Noted: 2019-02-19

## 2019-02-22 PROBLEM — D68.9 COAGULOPATHY (HCC): Status: ACTIVE | Noted: 2019-02-22

## 2019-02-22 PROBLEM — R57.1 HYPOVOLEMIC SHOCK (HCC): Status: ACTIVE | Noted: 2019-02-22

## 2019-02-22 PROBLEM — K92.2 GI BLEED: Status: ACTIVE | Noted: 2019-02-22

## 2019-02-22 PROBLEM — R74.01 TRANSAMINITIS: Status: RESOLVED | Noted: 2019-01-25 | Resolved: 2019-02-22

## 2019-02-22 PROBLEM — K92.2 GI BLEEDING: Status: ACTIVE | Noted: 2019-01-24

## 2019-02-22 LAB
ABO GROUP BLD BPU: NORMAL
ALBUMIN SERPL BCP-MCNC: 1.9 G/DL (ref 3.5–5)
ALP SERPL-CCNC: 54 U/L (ref 46–116)
ALT SERPL W P-5'-P-CCNC: 8 U/L (ref 12–78)
ANION GAP SERPL CALCULATED.3IONS-SCNC: 13 MMOL/L (ref 4–13)
ANION GAP SERPL CALCULATED.3IONS-SCNC: 13 MMOL/L (ref 4–13)
ANION GAP SERPL CALCULATED.3IONS-SCNC: 14 MMOL/L (ref 4–13)
ANION GAP SERPL CALCULATED.3IONS-SCNC: 14 MMOL/L (ref 4–13)
ANISOCYTOSIS BLD QL SMEAR: PRESENT
AST SERPL W P-5'-P-CCNC: 12 U/L (ref 5–45)
ATRIAL RATE: 102 BPM
ATRIAL RATE: 72 BPM
ATRIAL RATE: 76 BPM
ATRIAL RATE: 80 BPM
BASE EX.OXY STD BLDV CALC-SCNC: 51.7 % (ref 60–80)
BASE EXCESS BLDA CALC-SCNC: 0 MMOL/L (ref -2–3)
BASE EXCESS BLDV CALC-SCNC: -3.7 MMOL/L
BASOPHILS # BLD MANUAL: 0 THOUSAND/UL (ref 0–0.1)
BASOPHILS NFR MAR MANUAL: 0 % (ref 0–1)
BILIRUB SERPL-MCNC: 0.74 MG/DL (ref 0.2–1)
BODY TEMPERATURE: 99.6 DEGREES FEHRENHEIT
BPU ID: NORMAL
BUN SERPL-MCNC: 74 MG/DL (ref 5–25)
BUN SERPL-MCNC: 77 MG/DL (ref 5–25)
BUN SERPL-MCNC: 82 MG/DL (ref 5–25)
BUN SERPL-MCNC: 88 MG/DL (ref 5–25)
BURR CELLS BLD QL SMEAR: PRESENT
CA-I BLD-SCNC: 1.04 MMOL/L (ref 1.12–1.32)
CA-I BLD-SCNC: 1.05 MMOL/L (ref 1.12–1.32)
CA-I BLD-SCNC: 1.06 MMOL/L (ref 1.12–1.32)
CALCIUM SERPL-MCNC: 6.7 MG/DL (ref 8.3–10.1)
CALCIUM SERPL-MCNC: 7 MG/DL (ref 8.3–10.1)
CALCIUM SERPL-MCNC: 7.4 MG/DL (ref 8.3–10.1)
CALCIUM SERPL-MCNC: 7.6 MG/DL (ref 8.3–10.1)
CHLORIDE SERPL-SCNC: 100 MMOL/L (ref 100–108)
CHLORIDE SERPL-SCNC: 100 MMOL/L (ref 100–108)
CHLORIDE SERPL-SCNC: 101 MMOL/L (ref 100–108)
CHLORIDE SERPL-SCNC: 104 MMOL/L (ref 100–108)
CO2 SERPL-SCNC: 19 MMOL/L (ref 21–32)
CO2 SERPL-SCNC: 20 MMOL/L (ref 21–32)
CO2 SERPL-SCNC: 20 MMOL/L (ref 21–32)
CO2 SERPL-SCNC: 23 MMOL/L (ref 21–32)
CREAT SERPL-MCNC: 4.25 MG/DL (ref 0.6–1.3)
CREAT SERPL-MCNC: 4.93 MG/DL (ref 0.6–1.3)
CREAT SERPL-MCNC: 4.96 MG/DL (ref 0.6–1.3)
CREAT SERPL-MCNC: 5.26 MG/DL (ref 0.6–1.3)
EOSINOPHIL # BLD MANUAL: 0.88 THOUSAND/UL (ref 0–0.4)
EOSINOPHIL NFR BLD MANUAL: 4 % (ref 0–6)
ERYTHROCYTE [DISTWIDTH] IN BLOOD BY AUTOMATED COUNT: 15.2 % (ref 11.6–15.1)
ERYTHROCYTE [DISTWIDTH] IN BLOOD BY AUTOMATED COUNT: 15.8 % (ref 11.6–15.1)
FIBRINOGEN PPP-MCNC: 279 MG/DL (ref 227–495)
FIO2 GAS DIL.REBREATH: 0.28 L
GFR SERPL CREATININE-BSD FRML MDRD: 11 ML/MIN/1.73SQ M
GFR SERPL CREATININE-BSD FRML MDRD: 12 ML/MIN/1.73SQ M
GFR SERPL CREATININE-BSD FRML MDRD: 12 ML/MIN/1.73SQ M
GFR SERPL CREATININE-BSD FRML MDRD: 14 ML/MIN/1.73SQ M
GLUCOSE SERPL-MCNC: 115 MG/DL (ref 65–140)
GLUCOSE SERPL-MCNC: 123 MG/DL (ref 65–140)
GLUCOSE SERPL-MCNC: 136 MG/DL (ref 65–140)
GLUCOSE SERPL-MCNC: 147 MG/DL (ref 65–140)
GLUCOSE SERPL-MCNC: 188 MG/DL (ref 65–140)
HCO3 BLDA-SCNC: 23.8 MMOL/L (ref 22–28)
HCO3 BLDV-SCNC: 21.7 MMOL/L (ref 24–30)
HCT VFR BLD AUTO: 21.7 % (ref 36.5–49.3)
HCT VFR BLD AUTO: 23.3 % (ref 36.5–49.3)
HCT VFR BLD AUTO: 23.3 % (ref 36.5–49.3)
HCT VFR BLD AUTO: 24.9 % (ref 36.5–49.3)
HCT VFR BLD CALC: 17 % (ref 36.5–49.3)
HGB BLD-MCNC: 7 G/DL (ref 12–17)
HGB BLD-MCNC: 7.8 G/DL (ref 12–17)
HGB BLD-MCNC: 8 G/DL (ref 12–17)
HGB BLD-MCNC: 8.1 G/DL (ref 12–17)
HGB BLDA-MCNC: 5.8 G/DL (ref 12–17)
HOROWITZ INDEX BLDA+IHG-RTO: 40 MM[HG]
INR PPP: 1.36 (ref 0.86–1.17)
INR PPP: 1.48 (ref 0.86–1.17)
LYMPHOCYTES # BLD AUTO: 0.88 THOUSAND/UL (ref 0.6–4.47)
LYMPHOCYTES # BLD AUTO: 4 % (ref 14–44)
MAGNESIUM SERPL-MCNC: 2.4 MG/DL (ref 1.6–2.6)
MAGNESIUM SERPL-MCNC: 2.5 MG/DL (ref 1.6–2.6)
MAGNESIUM SERPL-MCNC: 2.5 MG/DL (ref 1.6–2.6)
MAGNESIUM SERPL-MCNC: 2.8 MG/DL (ref 1.6–2.6)
MCH RBC QN AUTO: 28.5 PG (ref 26.8–34.3)
MCH RBC QN AUTO: 29.2 PG (ref 26.8–34.3)
MCHC RBC AUTO-ENTMCNC: 32.3 G/DL (ref 31.4–37.4)
MCHC RBC AUTO-ENTMCNC: 32.5 G/DL (ref 31.4–37.4)
MCV RBC AUTO: 88 FL (ref 82–98)
MCV RBC AUTO: 90 FL (ref 82–98)
METAMYELOCYTES NFR BLD MANUAL: 1 % (ref 0–1)
MONOCYTES # BLD AUTO: 0.88 THOUSAND/UL (ref 0–1.22)
MONOCYTES NFR BLD: 4 % (ref 4–12)
MYELOCYTES NFR BLD MANUAL: 1 % (ref 0–1)
NEUTROPHILS # BLD MANUAL: 18.95 THOUSAND/UL (ref 1.85–7.62)
NEUTS BAND NFR BLD MANUAL: 2 % (ref 0–8)
NEUTS SEG NFR BLD AUTO: 84 % (ref 43–75)
NRBC BLD AUTO-RTO: 1 /100 WBC (ref 0–2)
NRBC BLD AUTO-RTO: 1 /100 WBCS
O2 CT BLDV-SCNC: 5.8 ML/DL
P AXIS: 47 DEGREES
P AXIS: 54 DEGREES
P AXIS: 62 DEGREES
PCO2 BLD: 25 MMOL/L (ref 21–32)
PCO2 BLD: 35.4 MM HG (ref 36–44)
PCO2 BLDV: 41.2 MM HG (ref 42–50)
PEEP RESPIRATORY: 8 CM[H2O]
PH BLD: 7.43 [PH] (ref 7.35–7.45)
PH BLDV: 7.34 [PH] (ref 7.3–7.4)
PHOSPHATE SERPL-MCNC: 2.6 MG/DL (ref 2.7–4.5)
PHOSPHATE SERPL-MCNC: 3.7 MG/DL (ref 2.7–4.5)
PHOSPHATE SERPL-MCNC: 3.7 MG/DL (ref 2.7–4.5)
PHOSPHATE SERPL-MCNC: 3.9 MG/DL (ref 2.7–4.5)
PLATELET # BLD AUTO: 121 THOUSANDS/UL (ref 149–390)
PLATELET # BLD AUTO: 150 THOUSANDS/UL (ref 149–390)
PLATELET BLD QL SMEAR: ABNORMAL
PMV BLD AUTO: 10.1 FL (ref 8.9–12.7)
PMV BLD AUTO: 10.9 FL (ref 8.9–12.7)
PO2 BLD: 72 MM HG (ref 75–129)
PO2 BLDV: 29.5 MM HG (ref 35–45)
POIKILOCYTOSIS BLD QL SMEAR: PRESENT
POLYCHROMASIA BLD QL SMEAR: PRESENT
POTASSIUM BLD-SCNC: 4.1 MMOL/L (ref 3.5–5.3)
POTASSIUM SERPL-SCNC: 3.5 MMOL/L (ref 3.5–5.3)
POTASSIUM SERPL-SCNC: 3.9 MMOL/L (ref 3.5–5.3)
POTASSIUM SERPL-SCNC: 3.9 MMOL/L (ref 3.5–5.3)
POTASSIUM SERPL-SCNC: 4 MMOL/L (ref 3.5–5.3)
PR INTERVAL: 242 MS
PR INTERVAL: 246 MS
PR INTERVAL: 250 MS
PROT SERPL-MCNC: 4.7 G/DL (ref 6.4–8.2)
PROTHROMBIN TIME: 16.9 SECONDS (ref 11.8–14.2)
PROTHROMBIN TIME: 18.1 SECONDS (ref 11.8–14.2)
QRS AXIS: 221 DEGREES
QRS AXIS: 238 DEGREES
QRS AXIS: 246 DEGREES
QRS AXIS: 255 DEGREES
QRSD INTERVAL: 167 MS
QRSD INTERVAL: 171 MS
QRSD INTERVAL: 175 MS
QRSD INTERVAL: 183 MS
QT INTERVAL: 392 MS
QT INTERVAL: 413 MS
QT INTERVAL: 421 MS
QT INTERVAL: 513 MS
QTC INTERVAL: 474 MS
QTC INTERVAL: 477 MS
QTC INTERVAL: 511 MS
QTC INTERVAL: 562 MS
RBC # BLD AUTO: 2.46 MILLION/UL (ref 3.88–5.62)
RBC # BLD AUTO: 2.77 MILLION/UL (ref 3.88–5.62)
RBC MORPH BLD: PRESENT
SAO2 % BLD FROM PO2: 95 % (ref 95–98)
SODIUM BLD-SCNC: 132 MMOL/L (ref 136–145)
SODIUM SERPL-SCNC: 134 MMOL/L (ref 136–145)
SODIUM SERPL-SCNC: 134 MMOL/L (ref 136–145)
SODIUM SERPL-SCNC: 136 MMOL/L (ref 136–145)
SODIUM SERPL-SCNC: 137 MMOL/L (ref 136–145)
SPECIMEN SOURCE: ABNORMAL
T WAVE AXIS: 34 DEGREES
T WAVE AXIS: 39 DEGREES
T WAVE AXIS: 55 DEGREES
T WAVE AXIS: 70 DEGREES
UNIT DISPENSE STATUS: NORMAL
UNIT PRODUCT CODE: NORMAL
UNIT RH: NORMAL
VENT AC: 12
VENT- AC: AC
VENTRICULAR RATE: 102 BPM
VENTRICULAR RATE: 72 BPM
VENTRICULAR RATE: 76 BPM
VENTRICULAR RATE: 80 BPM
VT SETTING VENT: 500 ML
WBC # BLD AUTO: 22.04 THOUSAND/UL (ref 4.31–10.16)
WBC # BLD AUTO: 22.28 THOUSAND/UL (ref 4.31–10.16)

## 2019-02-22 PROCEDURE — 71045 X-RAY EXAM CHEST 1 VIEW: CPT

## 2019-02-22 PROCEDURE — 85014 HEMATOCRIT: CPT | Performed by: PHYSICIAN ASSISTANT

## 2019-02-22 PROCEDURE — C9113 INJ PANTOPRAZOLE SODIUM, VIA: HCPCS | Performed by: PHYSICIAN ASSISTANT

## 2019-02-22 PROCEDURE — 74174 CTA ABD&PLVS W/CONTRAST: CPT

## 2019-02-22 PROCEDURE — 80048 BASIC METABOLIC PNL TOTAL CA: CPT | Performed by: PHYSICIAN ASSISTANT

## 2019-02-22 PROCEDURE — 94760 N-INVAS EAR/PLS OXIMETRY 1: CPT | Performed by: SOCIAL WORKER

## 2019-02-22 PROCEDURE — 43255 EGD CONTROL BLEEDING ANY: CPT | Performed by: INTERNAL MEDICINE

## 2019-02-22 PROCEDURE — 43241 EGD TUBE/CATH INSERTION: CPT | Performed by: INTERNAL MEDICINE

## 2019-02-22 PROCEDURE — 99233 SBSQ HOSP IP/OBS HIGH 50: CPT | Performed by: INTERNAL MEDICINE

## 2019-02-22 PROCEDURE — 99232 SBSQ HOSP IP/OBS MODERATE 35: CPT | Performed by: INTERNAL MEDICINE

## 2019-02-22 PROCEDURE — 82330 ASSAY OF CALCIUM: CPT | Performed by: STUDENT IN AN ORGANIZED HEALTH CARE EDUCATION/TRAINING PROGRAM

## 2019-02-22 PROCEDURE — 93010 ELECTROCARDIOGRAM REPORT: CPT | Performed by: INTERNAL MEDICINE

## 2019-02-22 PROCEDURE — 99292 CRITICAL CARE ADDL 30 MIN: CPT | Performed by: EMERGENCY MEDICINE

## 2019-02-22 PROCEDURE — P9017 PLASMA 1 DONOR FRZ W/IN 8 HR: HCPCS

## 2019-02-22 PROCEDURE — 80053 COMPREHEN METABOLIC PANEL: CPT | Performed by: PHYSICIAN ASSISTANT

## 2019-02-22 PROCEDURE — 85384 FIBRINOGEN ACTIVITY: CPT | Performed by: STUDENT IN AN ORGANIZED HEALTH CARE EDUCATION/TRAINING PROGRAM

## 2019-02-22 PROCEDURE — 30233L1 TRANSFUSION OF NONAUTOLOGOUS FRESH PLASMA INTO PERIPHERAL VEIN, PERCUTANEOUS APPROACH: ICD-10-PCS | Performed by: INTERNAL MEDICINE

## 2019-02-22 PROCEDURE — 94760 N-INVAS EAR/PLS OXIMETRY 1: CPT

## 2019-02-22 PROCEDURE — P9016 RBC LEUKOCYTES REDUCED: HCPCS

## 2019-02-22 PROCEDURE — 80048 BASIC METABOLIC PNL TOTAL CA: CPT | Performed by: INTERNAL MEDICINE

## 2019-02-22 PROCEDURE — 93005 ELECTROCARDIOGRAM TRACING: CPT

## 2019-02-22 PROCEDURE — 84100 ASSAY OF PHOSPHORUS: CPT | Performed by: PHYSICIAN ASSISTANT

## 2019-02-22 PROCEDURE — 85018 HEMOGLOBIN: CPT | Performed by: PHYSICIAN ASSISTANT

## 2019-02-22 PROCEDURE — 85027 COMPLETE CBC AUTOMATED: CPT | Performed by: PHYSICIAN ASSISTANT

## 2019-02-22 PROCEDURE — 83735 ASSAY OF MAGNESIUM: CPT | Performed by: PHYSICIAN ASSISTANT

## 2019-02-22 PROCEDURE — 99291 CRITICAL CARE FIRST HOUR: CPT | Performed by: EMERGENCY MEDICINE

## 2019-02-22 PROCEDURE — 99255 IP/OBS CONSLTJ NEW/EST HI 80: CPT | Performed by: INTERNAL MEDICINE

## 2019-02-22 PROCEDURE — 94002 VENT MGMT INPAT INIT DAY: CPT

## 2019-02-22 PROCEDURE — 85610 PROTHROMBIN TIME: CPT | Performed by: PHYSICIAN ASSISTANT

## 2019-02-22 PROCEDURE — 31500 INSERT EMERGENCY AIRWAY: CPT | Performed by: PHYSICIAN ASSISTANT

## 2019-02-22 PROCEDURE — 99254 IP/OBS CNSLTJ NEW/EST MOD 60: CPT | Performed by: SURGERY

## 2019-02-22 PROCEDURE — 0DC68ZZ EXTIRPATION OF MATTER FROM STOMACH, VIA NATURAL OR ARTIFICIAL OPENING ENDOSCOPIC: ICD-10-PCS | Performed by: INTERNAL MEDICINE

## 2019-02-22 PROCEDURE — 90945 DIALYSIS ONE EVALUATION: CPT

## 2019-02-22 PROCEDURE — 82330 ASSAY OF CALCIUM: CPT | Performed by: INTERNAL MEDICINE

## 2019-02-22 PROCEDURE — 84100 ASSAY OF PHOSPHORUS: CPT | Performed by: INTERNAL MEDICINE

## 2019-02-22 PROCEDURE — 85007 BL SMEAR W/DIFF WBC COUNT: CPT | Performed by: PHYSICIAN ASSISTANT

## 2019-02-22 PROCEDURE — 0W3P8ZZ CONTROL BLEEDING IN GASTROINTESTINAL TRACT, VIA NATURAL OR ARTIFICIAL OPENING ENDOSCOPIC: ICD-10-PCS | Performed by: INTERNAL MEDICINE

## 2019-02-22 PROCEDURE — 30233N1 TRANSFUSION OF NONAUTOLOGOUS RED BLOOD CELLS INTO PERIPHERAL VEIN, PERCUTANEOUS APPROACH: ICD-10-PCS | Performed by: INTERNAL MEDICINE

## 2019-02-22 PROCEDURE — 94003 VENT MGMT INPAT SUBQ DAY: CPT

## 2019-02-22 PROCEDURE — 82805 BLOOD GASES W/O2 SATURATION: CPT | Performed by: PHYSICIAN ASSISTANT

## 2019-02-22 PROCEDURE — 83735 ASSAY OF MAGNESIUM: CPT | Performed by: INTERNAL MEDICINE

## 2019-02-22 RX ORDER — MAGNESIUM SULFATE HEPTAHYDRATE 40 MG/ML
2 INJECTION, SOLUTION INTRAVENOUS ONCE
Status: COMPLETED | OUTPATIENT
Start: 2019-02-22 | End: 2019-02-22

## 2019-02-22 RX ORDER — SUCRALFATE ORAL 1 G/10ML
1000 SUSPENSION ORAL
Status: DISCONTINUED | OUTPATIENT
Start: 2019-02-22 | End: 2019-03-04

## 2019-02-22 RX ORDER — METOCLOPRAMIDE HYDROCHLORIDE 5 MG/ML
10 INJECTION INTRAMUSCULAR; INTRAVENOUS EVERY 6 HOURS SCHEDULED
Status: DISCONTINUED | OUTPATIENT
Start: 2019-02-22 | End: 2019-02-26

## 2019-02-22 RX ORDER — MIDAZOLAM HYDROCHLORIDE 1 MG/ML
4 INJECTION INTRAMUSCULAR; INTRAVENOUS ONCE
Status: DISCONTINUED | OUTPATIENT
Start: 2019-02-22 | End: 2019-02-22

## 2019-02-22 RX ORDER — METOCLOPRAMIDE HYDROCHLORIDE 5 MG/ML
10 INJECTION INTRAMUSCULAR; INTRAVENOUS ONCE
Status: COMPLETED | OUTPATIENT
Start: 2019-02-22 | End: 2019-02-22

## 2019-02-22 RX ORDER — PROPOFOL 10 MG/ML
5-50 INJECTION, EMULSION INTRAVENOUS
Status: DISCONTINUED | OUTPATIENT
Start: 2019-02-22 | End: 2019-02-26

## 2019-02-22 RX ORDER — FENTANYL CITRATE 50 UG/ML
50 INJECTION, SOLUTION INTRAMUSCULAR; INTRAVENOUS EVERY 2 HOUR PRN
Status: DISCONTINUED | OUTPATIENT
Start: 2019-02-22 | End: 2019-02-27

## 2019-02-22 RX ORDER — FENTANYL CITRATE 50 UG/ML
INJECTION, SOLUTION INTRAMUSCULAR; INTRAVENOUS
Status: COMPLETED
Start: 2019-02-22 | End: 2019-02-22

## 2019-02-22 RX ORDER — FENTANYL CITRATE 50 UG/ML
INJECTION, SOLUTION INTRAMUSCULAR; INTRAVENOUS
Status: DISPENSED
Start: 2019-02-22 | End: 2019-02-22

## 2019-02-22 RX ORDER — FENTANYL CITRATE 50 UG/ML
50 INJECTION, SOLUTION INTRAMUSCULAR; INTRAVENOUS ONCE
Status: COMPLETED | OUTPATIENT
Start: 2019-02-22 | End: 2019-02-22

## 2019-02-22 RX ORDER — SUCCINYLCHOLINE/SOD CL,ISO/PF 100 MG/5ML
100 SYRINGE (ML) INTRAVENOUS ONCE
Status: COMPLETED | OUTPATIENT
Start: 2019-02-22 | End: 2019-02-22

## 2019-02-22 RX ORDER — NOREPINEPHRINE BITARTRATE 1 MG/ML
INJECTION, SOLUTION INTRAVENOUS
Status: DISPENSED
Start: 2019-02-22 | End: 2019-02-22

## 2019-02-22 RX ORDER — POTASSIUM CHLORIDE 29.8 MG/ML
40 INJECTION INTRAVENOUS ONCE
Status: COMPLETED | OUTPATIENT
Start: 2019-02-22 | End: 2019-02-22

## 2019-02-22 RX ORDER — ETOMIDATE 2 MG/ML
0.3 INJECTION INTRAVENOUS ONCE
Status: COMPLETED | OUTPATIENT
Start: 2019-02-22 | End: 2019-02-22

## 2019-02-22 RX ORDER — MIDAZOLAM HYDROCHLORIDE 1 MG/ML
INJECTION INTRAMUSCULAR; INTRAVENOUS
Status: DISPENSED
Start: 2019-02-22 | End: 2019-02-22

## 2019-02-22 RX ORDER — PROPOFOL 10 MG/ML
INJECTION, EMULSION INTRAVENOUS
Status: COMPLETED
Start: 2019-02-22 | End: 2019-02-22

## 2019-02-22 RX ADMIN — VASOPRESSIN 0.04 UNITS/MIN: 20 INJECTION INTRAVENOUS at 08:48

## 2019-02-22 RX ADMIN — PROPOFOL 20 MCG/KG/MIN: 10 INJECTION, EMULSION INTRAVENOUS at 08:45

## 2019-02-22 RX ADMIN — SUCRALFATE 1000 MG: 1 SUSPENSION ORAL at 09:39

## 2019-02-22 RX ADMIN — VASOPRESSIN 0.04 UNITS/MIN: 20 INJECTION INTRAVENOUS at 18:17

## 2019-02-22 RX ADMIN — Medication 100 MG: at 05:21

## 2019-02-22 RX ADMIN — Medication 20000 ML: at 15:20

## 2019-02-22 RX ADMIN — CEFAZOLIN SODIUM 1000 MG: 10 INJECTION, POWDER, FOR SOLUTION INTRAVENOUS at 14:12

## 2019-02-22 RX ADMIN — NYSTATIN 1 APPLICATION: 100000 POWDER TOPICAL at 09:50

## 2019-02-22 RX ADMIN — SODIUM CHLORIDE 8 MG/HR: 9 INJECTION, SOLUTION INTRAVENOUS at 18:18

## 2019-02-22 RX ADMIN — MAGNESIUM SULFATE IN WATER 2 G: 40 INJECTION, SOLUTION INTRAVENOUS at 06:36

## 2019-02-22 RX ADMIN — METOCLOPRAMIDE 10 MG: 5 INJECTION, SOLUTION INTRAMUSCULAR; INTRAVENOUS at 23:42

## 2019-02-22 RX ADMIN — PHYTONADIONE 10 MG: 10 INJECTION, EMULSION INTRAMUSCULAR; INTRAVENOUS; SUBCUTANEOUS at 00:24

## 2019-02-22 RX ADMIN — NOREPINEPHRINE BITARTRATE 24 MCG/MIN: 1 INJECTION INTRAVENOUS at 06:35

## 2019-02-22 RX ADMIN — PROPOFOL 20 MCG/KG/MIN: 10 INJECTION, EMULSION INTRAVENOUS at 05:21

## 2019-02-22 RX ADMIN — CALCIUM GLUCONATE 2 G: 98 INJECTION, SOLUTION INTRAVENOUS at 18:19

## 2019-02-22 RX ADMIN — NOREPINEPHRINE BITARTRATE 15 MCG/MIN: 1 INJECTION INTRAVENOUS at 02:31

## 2019-02-22 RX ADMIN — CEFAZOLIN SODIUM 2000 MG: 10 INJECTION, POWDER, FOR SOLUTION INTRAVENOUS at 18:20

## 2019-02-22 RX ADMIN — IODIXANOL 100 ML: 320 INJECTION, SOLUTION INTRAVASCULAR at 03:56

## 2019-02-22 RX ADMIN — POTASSIUM CHLORIDE 40 MEQ: 400 INJECTION, SOLUTION INTRAVENOUS at 20:34

## 2019-02-22 RX ADMIN — NYSTATIN 1 APPLICATION: 100000 POWDER TOPICAL at 18:20

## 2019-02-22 RX ADMIN — NOREPINEPHRINE BITARTRATE 16 MCG/MIN: 1 INJECTION INTRAVENOUS at 14:15

## 2019-02-22 RX ADMIN — SUCRALFATE 1000 MG: 1 SUSPENSION ORAL at 16:08

## 2019-02-22 RX ADMIN — Medication 20000 ML: at 20:04

## 2019-02-22 RX ADMIN — NOREPINEPHRINE BITARTRATE 12 MCG/MIN: 1 INJECTION INTRAVENOUS at 18:17

## 2019-02-22 RX ADMIN — FENTANYL CITRATE 50 MCG: 50 INJECTION, SOLUTION INTRAMUSCULAR; INTRAVENOUS at 05:23

## 2019-02-22 RX ADMIN — HYDROCORTISONE 1 APPLICATION: 1 CREAM TOPICAL at 18:20

## 2019-02-22 RX ADMIN — NOREPINEPHRINE BITARTRATE 17 MCG/MIN: 1 INJECTION INTRAVENOUS at 01:07

## 2019-02-22 RX ADMIN — METOCLOPRAMIDE 10 MG: 5 INJECTION, SOLUTION INTRAMUSCULAR; INTRAVENOUS at 06:17

## 2019-02-22 RX ADMIN — HYDROCORTISONE 1 APPLICATION: 1 CREAM TOPICAL at 09:50

## 2019-02-22 RX ADMIN — PROPOFOL 15 MCG/KG/MIN: 10 INJECTION, EMULSION INTRAVENOUS at 23:11

## 2019-02-22 RX ADMIN — METOCLOPRAMIDE 10 MG: 5 INJECTION, SOLUTION INTRAMUSCULAR; INTRAVENOUS at 12:09

## 2019-02-22 RX ADMIN — ETOMIDATE 49.8 MG: 20 INJECTION, SOLUTION INTRAVENOUS at 05:00

## 2019-02-22 RX ADMIN — NOREPINEPHRINE BITARTRATE 20 MCG/MIN: 1 INJECTION INTRAVENOUS at 09:53

## 2019-02-22 RX ADMIN — VASOPRESSIN 0.04 UNITS/MIN: 20 INJECTION INTRAVENOUS at 01:43

## 2019-02-22 RX ADMIN — METOCLOPRAMIDE 10 MG: 5 INJECTION, SOLUTION INTRAMUSCULAR; INTRAVENOUS at 05:26

## 2019-02-22 RX ADMIN — POTASSIUM CHLORIDE 40 MEQ: 400 INJECTION, SOLUTION INTRAVENOUS at 16:05

## 2019-02-22 RX ADMIN — PROPOFOL 15 MCG/KG/MIN: 10 INJECTION, EMULSION INTRAVENOUS at 14:19

## 2019-02-22 RX ADMIN — PROPOFOL 15 MCG/KG/MIN: 10 INJECTION, EMULSION INTRAVENOUS at 18:18

## 2019-02-22 RX ADMIN — CALCIUM GLUCONATE 2 G: 98 INJECTION, SOLUTION INTRAVENOUS at 08:50

## 2019-02-22 RX ADMIN — SODIUM CHLORIDE 8 MG/HR: 9 INJECTION, SOLUTION INTRAVENOUS at 07:20

## 2019-02-22 RX ADMIN — METOCLOPRAMIDE 10 MG: 5 INJECTION, SOLUTION INTRAMUSCULAR; INTRAVENOUS at 18:20

## 2019-02-22 NOTE — CONSULTS
Vancomycin IV Pharmacy-to-Dose Consultation    Janelle Kim is a 60 y o  male who was receiving Vancomycin IV with management by the Pharmacy Consult service for treatment of bacteremia    The patient?s Vancomycin therapy has been discontinued  Thank you for allowing us to take part in this patient's care  Pharmacy will sign-off now; please call or re-consult if there are any questions        Mag Basilio, PharmD, [address] [facility]  Critical Care Clinical Pharmacist

## 2019-02-22 NOTE — PHYSICAL THERAPY NOTE
Physical Therapy Cancellation Note    PT W/ MEDICAL DECOMPENSATION/ DECLINE OVERNIGHT REQUIRING INTUBATION AND 10 UNITS OF BLOOD- PT CURRENTLY ON VENT/SEDATED AND NOT APPROPRIATE FOR SKILLED PT INTERVENTIONS AT PRESENT  PT WILL REQUIRE PHYSICAL THERAPY RE-EVALUATION WHEN MEDICALLY STABLE- WILL D/C PT FOR NOW AND PLEASE RE CONSULT WHEN MEDICALLY APPROPRIATE FOR RESUMPTION OF PHYSICAL THERAPY SERVICES  THANK YOU

## 2019-02-22 NOTE — PROGRESS NOTES
NEPHROLOGY PROGRESS NOTE   Georges Recio 60 y o  male MRN: [id number]  Unit/Bed#: [facility] 733-09 Encounter: [id number]      ASSESSMENT & PLAN:    1  Acute kidney injury secondary to ATN will be in acute as an outpatient Tuesday Thursday Saturday  -dialysis terminated early was having diarrhea the events noted over last 12 hours patient with a GI bleed which likely was the cause of the hypotension    2  Access: Tunneled dialysis catheter replaced by IR  -tunneled dialysis catheter remains in place and in use    3  MSSA bacteremia on 6 weeks of antibiotics  -continues on vancomycin for bacteremia    4  Bioprosthetic valve  -continue to monitor cardiac function    5  Atrial fibrillation  -currently heart rates are acceptable  -currently no anticoagulation given GI bleed    6  Anemia of chronic kidney disease  -based on most recent studies unclear whether this was an ischemic event, acute CVA regarding his encephalopathy therefore will hold off on Epogen  -hemoglobin dropping because of GI bleed events noted had urgent endoscopy ongoing care by critical care and GI remains on pressors transfuse as needed    7  Ischemic cardiomyopathy with an EF 30%  -continue ultrafiltration as tolerated    8  CKD bone mineral disease  -continue PhosLo for hyperphosphatemia    9  atrial fibrillation  -on Coumadin for anticoagulation now on hold    ADDENDUM:  Discussed with critical care regarding volume status concern for volume overload as respiratory requirements increased, intubated no recent blood transfusion weight up to 165 kg and remains on levophed and vasopressin  WIll initiate CVVHD-run even for next 12-24 hours to avoid further volume overload  If pressor requirement decrease can attempt net negative balance      SUBJECTIVE:    Events noted overnight patient intubated and on pressors in the ICU large GI bleed requiring endoscopic treatment      OBJECTIVE:  Current Weight: Weight - Scale: (!) 165 kg (363 lb 12 1 oz)  Vitals: 02/22/19 0846   BP:    Pulse: 72   Resp: 19   Temp:    SpO2: 100%     Vitals:    02/22/19 0715 02/22/19 0800 02/22/19 0836 02/22/19 0846   BP: 138/61  107/55    Pulse: 72 72 72 72   Resp: 20 16 21 19   Temp: 98 °F (36 7 °C)      TempSrc: Axillary      SpO2: 100% 100% 100% 100%   Weight:       Height:               Intake/Output Summary (Last 24 hours) at 2/22/2019 0901  Last data filed at 2/22/2019 0800  Gross per 24 hour   Intake 6774 39 ml   Output 285 ml   Net 6489 39 ml       General: conscious, cooperative, in not acute distress  Eyes: conjunctivae pink, anicteric sclerae  ENT: lips and mucous membranes moist  Neck: supple, no JVD  Chest: clear breath sounds bilateral, no crackles, ronchus or wheezings, tunneled dialysis catheter  CVS: distinct S1 & S2, normal rate, regular rhythm  Abdomen:  Soft nontender nondistended  Extremities:  2+ edema  Skin: no rash  Neuro: awake, alert, oriented        Medications:    Current Facility-Administered Medications:     acetaminophen (TYLENOL) tablet 650 mg, 650 mg, Oral, Q6H PRN, Aram Meek PA-ANA, 650 mg at 02/20/19 2007    amiodarone (CORDARONE) 900 mg in dextrose 5 % 500 mL infusion, 1 mg/min, Intravenous, Continuous, Lizy Hannah PA-C, Last Rate: 33 3 mL/hr at 02/21/19 2117, 1 mg/min at 02/21/19 2117    atorvastatin (LIPITOR) tablet 40 mg, 40 mg, Oral, Daily [facility]il [facility], 40 mg at 02/21/19 1621    bisacodyl (DULCOLAX) rectal suppository 10 mg, 10 mg, Rectal, Daily PRN, Aram Meek PA-C    calcium acetate (PHOSLO) capsule 1,334 mg, 1,334 mg, Oral, TID With Meals, CECILE Samuels-ANA, 1,334 mg at 02/21/19 1621    calcium gluconate 2 g in sodium chloride 0 9 % 100 mL IVPB, 2 g, Intravenous, Once, Cammie Chaudhary MD, Last Rate: 100 mL/hr at 02/22/19 0850, 2 g at 02/22/19 0850    fentanyl citrate (PF) 100 MCG/2ML **ADS Override Pull**, , , ,     fentanyl citrate (PF) 100 MCG/2ML 50 mcg, 50 mcg, Intravenous, Q2H PRN, Marsha Costa, HERBERT    guaiFENesin (MUCINEX) 12 hr tablet 600 mg, 600 mg, Oral, Q12H [facility], Rubin Ogden PA-C, 600 mg at 02/21/19 1145    hydrocortisone 1 % cream, , Topical, BID, Rubin Ogden PA-C, 1 application at 25/56/05 1148    [START ON 2/23/2019] iron sucrose (VENOFER) 100 mg in sodium chloride 0 9 % 100 mL IVPB, 100 mg, Intravenous, Once per day on Tue Thu Sat, Rubin Ogden PA-C    metoclopramide (REGLAN) injection 10 mg, 10 mg, Intravenous, Q6H [facility], Fern Hannah PA-C    midazolam (VERSED) 2 mg/2 mL injection **ADS Override Pull**, , , ,     norepinephrine (LEVOPHED) 1 mg/mL injection **ADS Override Pull**, , , ,     norepinephrine (LEVOPHED) 4 mg (STANDARD CONCENTRATION) IV in sodium chloride 0 9% 250 mL, 1-30 mcg/min, Intravenous, Titrated, Nena Hannah PA-C, Last Rate: 75 mL/hr at 02/22/19 0846, 20 mcg/min at 02/22/19 0846    nystatin (MYCOSTATIN) powder, , Topical, BID, Rubin Ogden PA-C    pantoprazole (PROTONIX) 80 mg in sodium chloride 0 9 % 100 mL infusion, 8 mg/hr, Intravenous, Continuous, Rubin Ogden PA-C, Last Rate: 10 mL/hr at 02/22/19 0720, 8 mg/hr at 02/22/19 0720    polyvinyl alcohol (LIQUIFILM TEARS) 1 4 % ophthalmic solution 1 drop, 1 drop, Both Eyes, Q3H PRN, Rubin Ogden PA-C, 1 drop at 02/13/19 1724    propofol (DIPRIVAN) 1000 mg in 100 mL infusion (premix), 5-50 mcg/kg/min, Intravenous, Titrated, Nena Hannah PA-C, Last Rate: 19 9 mL/hr at 02/22/19 0845, 20 mcg/kg/min at 02/22/19 0845    sucralfate (CARAFATE) oral suspension 1,000 mg, 1,000 mg, Oral, BID AC, Fernsameer Hannah PA-C    vancomycin (VANCOCIN) IVPB (premix) 1,000 mg, 10 mg/kg (Adjusted), Intravenous, After Dialysis, Rubin Ogden PA-C, Stopped at 02/21/19 1041    vasopressin (PITRESSIN) 20 Units in sodium chloride 0 9 % 100 mL infusion, 0 04 Units/min, Intravenous, Continuous, Fern Hannah PA-C, Last Rate: 12 mL/hr at 02/22/19 0848, 0 04 Units/min at 02/22/19 0848    Invasive Devices:      Lab Results:   Results from last 7 days   Lab Units 02/22/19  0637 02/22/19  0137 02/21/19  2243 02/21/19  2235 02/21/19 2120 02/21/19  2040  02/20/19  0831  02/18/19  0909  02/15/19  1358   WBC Thousand/uL 22 04* 22 28*  --   --  22 95*  --   --  11 91*  --  10 54*   < >  --    HEMOGLOBIN g/dL 8 1* 7 0*  --  7 7* 6 4*  --    < > 7 4*   < > 7 0*   < >  --    I STAT HEMOGLOBIN g/dl  --   --  7 1*  --   --  5 8*  --   --   --   --   --   --    HEMATOCRIT % 24 9* 21 7*  --  23 7* 20 3*  --    < > 24 1*   < > 23 1*   < >  --    HEMATOCRIT, ISTAT %  --   --  21*  --   --  17*  --   --   --   --   --   --    PLATELETS Thousands/uL 121* 150  --   --  188  --   --  226  --  238   < >  --    POTASSIUM mmol/L 4 0 3 9  --   --  4 1  --   --   --   --   --   --  4 2   CHLORIDE mmol/L 100 100  --   --  100  --   --   --   --   --   --  93*   CO2 mmol/L 20* 23  --   --  25  --   --   --   --   --   --  25   CO2, I-STAT mmol/L  --   --  26  --   --  25  --   --   --   --   --   --    BUN mg/dL 82* 77*  --   --  74*  --   --   --   --   --   --  93*   CREATININE mg/dL 4 96* 4 93*  --   --  5 16*  --   --   --   --   --   --  7 14*   CALCIUM mg/dL 6 7* 7 0*  --   --  7 3*  --   --   --   --   --   --  8 2*   MAGNESIUM mg/dL 2 5 2 4  --   --  2 6  --   --   --   --   --   --   --    PHOSPHORUS mg/dL 3 9 3 7  --   --  3 9  --   --   --   --   --   --   --    ALK PHOS U/L  --  54  --   --  57  --   --   --   --   --   --  72   ALT U/L  --  8*  --   --  <6*  --   --   --   --   --   --  7*   AST U/L  --  12  --   --  12  --   --   --   --   --   --  21   GLUCOSE, ISTAT mg/dl  --   --  117  --   --  115  --   --   --   --   --   --     < > = values in this interval not displayed         Previous work up:  Please see previous notes

## 2019-02-22 NOTE — RESPIRATORY THERAPY NOTE
RT Protocol Note  Riley Espinoza 60 y o  male MRN: [id number]  Unit/Bed#: [facility] 518-01 Encounter: [id number]    Assessment    Principal Problem:    Hypovolemic shock ([facility] [address] )  Active Problems:    Stress-induced cardiomyopathy    Acute respiratory failure with hypoxia ([facility] [address] )    History of aortic valve replacement with bioprosthetic valve    Atrial fibrillation (HCC)    Staphylococcus aureus bacteremia    Acute blood loss anemia    Leukocytosis    Cerebrovascular accident ([facility] [address] )    Acute systolic CHF (congestive heart failure) ([facility])    Toxic metabolic encephalopathy    Skin rash    Acute on chronic renal failure (HCC)    Hyponatremia    GI bleed    Coagulopathy ([facility])    GI bleeding      Home Pulmonary Medications:  None       Past Medical History:   Diagnosis Date    Allergic rhinitis     last assessed 9/12/12    Finger fracture, right     Closed fx of the middle phalanx of the right 5th finger  last assessed 1/30/14    Hemorrhoids     last assessed 2/10/14    Hyperlipidemia     Hypertension      Social History     Socioeconomic History    Marital status: /Civil Union     Spouse name: None    Number of children: None    Years of education: None    Highest education level: None   Occupational History    None   Social Needs    Financial resource strain: None    Food insecurity:     Worry: None     Inability: None    Transportation needs:     Medical: None     Non-medical: None   Tobacco Use    Smoking status: Never Smoker    Smokeless tobacco: Never Used   Substance and Sexual Activity    Alcohol use: Never     Frequency: Never     Comment: ocassion /never drank alcohol per [employer]     Drug use: No    Sexual activity: None   Lifestyle    Physical activity:     Days per week: None     Minutes per session: None    Stress: None   Relationships    Social connections:     Talks on phone: None     Gets together: None     Attends Latter day service: None     Active member of club or organization: None Attends meetings of clubs or organizations: None     Relationship status: None    Intimate partner violence:     Fear of current or ex partner: None     Emotionally abused: None     Physically abused: None     Forced sexual activity: None   Other Topics Concern    None   Social History Narrative    None       Subjective    Subjective Data: Pt is intubated    Objective    Physical Exam:   Assessment Type: Assess only  General Appearance: Sedated  Respiratory Pattern: Assisted  Chest Assessment: Chest expansion symmetrical  Bilateral Breath Sounds: Diminished, Clear  O2 Device: Vent    Vitals:  Blood pressure (!) 85/46, pulse 82, temperature 98 5 °F (36 9 °C), temperature source Axillary, resp  rate 18, height 5' 9" (1 753 m), weight (!) 166 kg (365 lb 11 9 oz), SpO2 100 %  Imaging and other studies: I have personally reviewed pertinent reports  O2 Device: Vent     Plan    Respiratory Plan: Vent/NIV/HFNC        Resp Comments: Pt intubated at bedside at this time by CECILE Hannah for emergency endoscopy procedure  Bilateral breath sounds noted and positive color change on end tidal  Pt placed on 840 with approved physican settings  Per chart pt does not have any pulmonary history nor does pt take any respiratory meds at home  Will continue to monitor under protocol

## 2019-02-22 NOTE — CONSULTS
Consultation - General Surgery  Abel Almeida 60 y o  male MRN: [id number]  Unit/Bed#: [facility] 518-01 Encounter: [id number]        Assessment/Plan     Assessment:  60year old with GI bleeding    Plan:  NPO  Will follow-up CTA  Suspect with need endoscopic control of GI bleeding  Protonix gtt  Coagulopathy reversal  Consider [facility] if coagulopathy fails to correct    History of Present Illness     HPI:  Abel Almeida is a 60 y o  male is admitted to the hospital with sepsis and MSSA bacteremia likely secondary to pneumonia  He was in critical condition and developed renal failure and is now dialysis dependent, as well as new onset atrial fibrillation for which he was anticoagulated with Coumadin  Over the last few weeks he is notably improved the 2 days ago he acutely decompensated  He started with bloody bowel movements yesterday  Today he had more melena  This evening he went into VTach requiring cardioversion and lidocaine  He has received 6 units packed red blood cells with an unresponsive hemoglobin remaining 7 0  His coagulopathy secondary to Coumadin use has been reversed with FFP and vitamin K  General surgery is consulted for possible surgical intervention for his GI bleed  He states his most recent colonoscopy was 5 years ago which was apparently normal   Per report there are no masses polyps or diverticula  He states he has never had an endoscopy      Review of Systems   Unable to perform ROS: Unstable vital signs       Historical Information   Past Medical History:   Diagnosis Date    Allergic rhinitis     last assessed 9/12/12    Finger fracture, right     Closed fx of the middle phalanx of the right 5th finger  last assessed 1/30/14    Hemorrhoids     last assessed 2/10/14    Hyperlipidemia     Hypertension      Past Surgical History:   Procedure Laterality Date    AORTIC VALVE REPLACEMENT  07/30/2014    AVR with 25mm [facility] Ease bovine pericardial valve    CARDIAC CATHETERIZATION 2014    SLB left main-normal, circumflex-normal, ramus intermedius-normal, RCA was large and dominant giving rise to the PDA & a large posterolateral branch  No disease  last assessed 14     IR [address] PLACEMENT  2019    IR [address] PLACEMENT  2019    KNEE CARTILAGE SURGERY Left     medial meniscus tear     TESTICLE SURGERY      TONSILLECTOMY AND ADENOIDECTOMY      TOOTH EXTRACTION       Social History   Social History     Substance and Sexual Activity   Alcohol Use Never    Frequency: Never    Comment: ocassion /never drank alcohol per [employer]      Social History     Substance and Sexual Activity   Drug Use No     Social History     Tobacco Use   Smoking Status Never Smoker   Smokeless Tobacco Never Used     Family History:   Family History   Problem Relation Age of Onset    Lung cancer Mother     Heart disease Mother         pacemaker placement     Coronary artery disease Father     Hypertension Father     Heart attack Father     Cancer Family         bladder        Meds/Allergies   PTA meds:   Prior to Admission Medications   Prescriptions Last Dose Informant Patient Reported? Taking?    amLODIPine (NORVASC) 5 mg tablet   No No   Sig: Take 1 tablet (5 mg total) by mouth daily   ascorbic acid (VITAMIN C) 500 MG tablet   Yes No   Sig: Take 1 tablet by mouth 2 (two) times a day   aspirin (ECOTRIN) 325 mg EC tablet   No No   Sig: Take 1 tablet (325 mg total) by mouth daily   fluticasone (FLONASE) 50 mcg/act nasal spray   Yes No   Si spray into each nostril 2 (two) times a day   loratadine (CLARITIN) 10 mg tablet   Yes No   Sig: Take 1 tablet by mouth   metoprolol tartrate (LOPRESSOR) 100 mg tablet   No No   Sig: Take 1 tablet (100 mg total) by mouth 2 (two) times a day   potassium chloride (K-DUR,KLOR-CON) 20 mEq tablet   No No   Sig: Take 1 tablet (20 mEq total) by mouth daily   pravastatin (PRAVACHOL) 40 mg tablet   No No   Sig: Take 1 tablet (40 mg total) by mouth daily at bedtime   torsemide (DEMADEX) 20 mg tablet   No No   Sig: Take 1 tablet (20 mg total) by mouth daily      Facility-Administered Medications: None     Allergies   Allergen Reactions    Penicillins Rash     However, has subsequently tolerated Cefazolin and Cefepime       Objective   First Vitals:   Blood Pressure: 124/72 (01/24/19 1530)  Pulse: 94 (01/24/19 1530)  Temperature: 99 °F (37 2 °C) (01/24/19 1530)  Temp Source: Rectal (01/24/19 1530)  Respirations: 20 (01/24/19 1530)  Height: 5' 9" (175 3 cm) (01/24/19 1530)  Weight - Scale: (!) 145 kg (319 lb 3 6 oz) (01/24/19 1530)  SpO2: 98 % (01/24/19 1500)    Current Vitals:   Blood Pressure: 90/52 (02/22/19 0230)  Pulse: 74 (02/22/19 0230)  Temperature: 99 3 °F (37 4 °C) (02/22/19 0230)  Temp Source: Axillary (02/22/19 0230)  Respirations: 20 (02/22/19 0230)  Height: 5' 9" (175 3 cm) (01/24/19 1530)  Weight - Scale: (!) 166 kg (365 lb 11 9 oz) (02/21/19 0535)  SpO2: 99 % (02/22/19 0230)      Intake/Output Summary (Last 24 hours) at 2/22/2019 0319  Last data filed at 2/22/2019 0204  Gross per 24 hour   Intake 4871 86 ml   Output 260 ml   Net 4611 86 ml       Invasive Devices     Central Venous Catheter Line            CVC Central Lines 02/21/19 less than 1 day          Peripheral Intravenous Line            Peripheral IV 02/19/19 Right Antecubital 2 days    Peripheral IV 02/21/19 Left Antecubital less than 1 day          Arterial Line            Arterial Line 02/21/19 Radial less than 1 day          Hemodialysis Catheter            Permanent HD Catheter  3 days                Physical Exam   Constitutional: He appears well-developed and well-nourished  HENT:   Head: Normocephalic  Eyes: Pupils are equal, round, and reactive to light  Neck: Normal range of motion  Cardiovascular: Normal rate and regular rhythm  Pulmonary/Chest:   BiPAP in place   Abdominal:   Soft, obese, non-tender, non-distended   Musculoskeletal: Normal range of motion     Neurological: He is alert  Skin: Skin is warm and dry  Psychiatric: He has a normal mood and affect  Lab Results:   CBC:   Lab Results   Component Value Date    WBC 22 28 (H) 02/22/2019    HGB 7 0 (L) 02/22/2019    HCT 21 7 (L) 02/22/2019    MCV 88 02/22/2019     02/22/2019    MCH 28 5 02/22/2019    MCHC 32 3 02/22/2019    RDW 15 2 (H) 02/22/2019    MPV 10 1 02/22/2019   , CMP:   Lab Results   Component Value Date    SODIUM 136 02/22/2019    K 3 9 02/22/2019     02/22/2019    CO2 23 02/22/2019    CO2 26 02/21/2019    BUN 77 (H) 02/22/2019    CREATININE 4 93 (H) 02/22/2019    GLUCOSE 117 02/21/2019    CALCIUM 7 0 (L) 02/22/2019    AST 12 02/22/2019    ALT 8 (L) 02/22/2019    ALKPHOS 54 02/22/2019    EGFR 12 02/22/2019   , Coagulation:   Lab Results   Component Value Date    INR 1 48 (H) 02/22/2019     Imaging: I have personally reviewed pertinent reports  EKG, Pathology, and Other Studies: I have personally reviewed pertinent reports        Code Status: Level 1 - Full Code  Advance Directive and Living Will:      Power of :    POLST:

## 2019-02-22 NOTE — PROGRESS NOTES
02/22/19 1400   Spiritual Beliefs/Perceptions   Support Systems Spouse/significant other;Family members   Plan of Care   Comments Consult with nursing staff about pt  no family presence during visit     Assessment Completed by: Unit visit

## 2019-02-22 NOTE — CONSULTS
Consultation - [facility]  Santy Wasserman 60 y o  male MRN: [id number]  Unit/Bed#: [facility] 376-98 Encounter: [id number]             Inpatient consult to gastroenterology     Performed by  Harpreet Casiano MD     Authorized by Radha Oneal PA-C              Reason for Consult / Principal Problem:     Anemia  Hemorrhagic shock  Melena      ASSESSMENT AND PLAN:      Anemia  Hemorrhagic shock  Melena  -suspect acute upper GI bleeding based off of melanic bowel movements and CTA with extravasation at gastric antrum/pylorus  -patient received 10 units PRBCs without appropriate response  Hemoglobin 5 3-->7  -patient requiring increased vasopressor support  -continues on PPI drip  -will plan on IV Reglan 10 mg now  -given earlier supratherapeutic INR of 2 65 patient received 2 units FFP and 20 units of IV vitamin K, INR to 1 48, will give additional 2 units FFP stat  Patient's last dose of warfarin was on 02/20  -PRBCs on hold  -appreciate surgery evaluation  -discussed with critical care team will plan for emergent bedside EGD to evaluate for most likely acute bleeding ulcer versus Dieulafoy's lesion  -patient is currently intubated and critical care team will provide sedation  -if no definitive lesion is identified that correlates with CTA or if we are unable to achieve adequate hemostasis may need to consider interventional radiology or surgical intervention   -updated patient's wife Caron Johnson at [phone] who also provided consent for EGD      _____________________________________________________________________    HPI:  Patient is currently intubated so information obtained from chart  68-year-old male seen in consultation for anemia, hemorrhagic shock, melena  Patient initially admitted on 01/24 as transfer from [address] [place] [address],[address] secondary to septic shock from MSSA bacteremia likely from pneumonia     Patient developed acute kidney failure requiring CVVH secondary to refractory acidosis  He required prolonged ICU stay where he required ventilator, vasopressor and HD  Patient had progressed well with clearance of his bacteremia and was stable for discharge with outpatient IV antibiotics and dialysis however he developed 2 large volume bowel movements which were dark on 02/20  On 02/21 he had 2 episodes of melena and his hemoglobin dropped from 5 4  He was placed on Protonix drip and transferred to ICU  Overnight he required large volume blood transfusions without adequate hemoglobin response  He also developed episodes of ventral tachycardia requiring defibrillation as well as antiarrhythmic medications  As per chart colonoscopy was about 5 years ago which was apparently normal however he has never had an EGD  REVIEW OF SYSTEMS:    Unable to determine as patient is intubated and sedated      Historical Information   Past Medical History:   Diagnosis Date    Allergic rhinitis     last assessed 9/12/12    Finger fracture, right     Closed fx of the middle phalanx of the right 5th finger  last assessed 1/30/14    Hemorrhoids     last assessed 2/10/14    Hyperlipidemia     Hypertension      Past Surgical History:   Procedure Laterality Date    AORTIC VALVE REPLACEMENT  07/30/2014    AVR with 25mm [facility] Ease bovine pericardial valve    CARDIAC CATHETERIZATION  07/18/2014    SLB left main-normal, circumflex-normal, ramus intermedius-normal, RCA was large and dominant giving rise to the PDA & a large posterolateral branch  No disease    last assessed 8/19/14     IR [facility] LAST [facility]TL PLACEMENT  2/4/2019    IR [facility] PLACEMENT  2/18/2019    KNEE CARTILAGE SURGERY Left     medial meniscus tear     TESTICLE SURGERY      TONSILLECTOMY AND ADENOIDECTOMY      TOOTH EXTRACTION       Social History   Social History     Substance and Sexual Activity   Alcohol Use Never    Frequency: Never    Comment: ocassion /never drank alcohol per [employer]      Social History     Substance and Sexual Activity   Drug Use No     Social History     Tobacco Use   Smoking Status Never Smoker   Smokeless Tobacco Never Used     Family History   Problem Relation Age of Onset    Lung cancer Mother     Heart disease Mother         pacemaker placement     Coronary artery disease Father     Hypertension Father     Heart attack Father     Cancer Family         bladder        Meds/Allergies     Medications Prior to Admission   Medication    amLODIPine (NORVASC) 5 mg tablet    ascorbic acid (VITAMIN C) 500 MG tablet    aspirin (ECOTRIN) 325 mg EC tablet    fluticasone (FLONASE) 50 mcg/act nasal spray    loratadine (CLARITIN) 10 mg tablet    metoprolol tartrate (LOPRESSOR) 100 mg tablet    potassium chloride (K-DUR,KLOR-CON) 20 mEq tablet    pravastatin (PRAVACHOL) 40 mg tablet    torsemide (DEMADEX) 20 mg tablet     Current Facility-Administered Medications   Medication Dose Route Frequency    acetaminophen (TYLENOL) tablet 650 mg  650 mg Oral Q6H PRN    amiodarone (CORDARONE) 900 mg in dextrose 5 % 500 mL infusion  1 mg/min Intravenous Continuous    atorvastatin (LIPITOR) tablet 40 mg  40 mg Oral Daily With Dinner    bisacodyl (DULCOLAX) rectal suppository 10 mg  10 mg Rectal Daily PRN    calcium acetate (PHOSLO) capsule 1,334 mg  1,334 mg Oral TID With Meals    guaiFENesin (MUCINEX) 12 hr tablet 600 mg  600 mg Oral Q12H ARACELIS    hydrocortisone 1 % cream   Topical BID    [START ON 2/23/2019] iron sucrose (VENOFER) 100 mg in sodium chloride 0 9 % 100 mL IVPB  100 mg Intravenous Once per day on Tue Thu Sat    norepinephrine (LEVOPHED) 1 mg/mL injection **ADS Override Pull**        norepinephrine (LEVOPHED) 4 mg (STANDARD CONCENTRATION) IV in sodium chloride 0 9% 250 mL  1-30 mcg/min Intravenous Titrated    nystatin (MYCOSTATIN) powder   Topical BID    pantoprazole (PROTONIX) 80 mg in sodium chloride 0 9 % 100 mL infusion  8 mg/hr Intravenous Continuous    polyvinyl alcohol (LIQUIFILM TEARS) 1 4 % ophthalmic solution 1 drop  1 drop Both Eyes Q3H PRN    propofol (DIPRIVAN) 1000 mg in 100 mL infusion (premix)  5-50 mcg/kg/min Intravenous Titrated    vancomycin (VANCOCIN) IVPB (premix) 1,000 mg  10 mg/kg (Adjusted) Intravenous After Dialysis    vasopressin (PITRESSIN) 20 Units in sodium chloride 0 9 % 100 mL infusion  0 04 Units/min Intravenous Continuous       Allergies   Allergen Reactions    Penicillins Rash     However, has subsequently tolerated Cefazolin and Cefepime           Objective     Blood pressure (!) 85/46, pulse 82, temperature 98 5 °F (36 9 °C), temperature source Axillary, resp  rate 18, height 5' 9" (1 753 m), weight (!) 166 kg (365 lb 11 9 oz), SpO2 100 %  Body mass index is 54 01 kg/m²  Intake/Output Summary (Last 24 hours) at 2/22/2019 4009  Last data filed at 2/22/2019 0204  Gross per 24 hour   Intake 4651 86 ml   Output 260 ml   Net 4391 86 ml         PHYSICAL EXAM:      General Appearance:   Sedated, intubated   HEENT:   Normocephalic, atraumatic, anicteric      Neck:  Supple, symmetrical, trachea midline   Lungs:   Clear to auscultation bilaterally; no rales, rhonchi or wheezing; respirations unlabored    Heart[de-identified]   Regular rate and rhythm; no murmur appreciated   Abdomen:   Soft, non-tender, + distended; decreased bowel sounds    Genitalia:   Deferred    Rectal:   Deferred, as gross melena was visualized    Extremities:  +edema        Skin:  No jaundice, rashes, or lesions          Lab Results:   Admission on 01/24/2019   No results displayed because visit has over 200 results  Imaging Studies: I have personally reviewed pertinent imaging studies

## 2019-02-22 NOTE — PROGRESS NOTES
CTA completed, discussed with radiology  Contrast in the antrum of the stomach with blood throughout the colon  GI contacted and plan for emergent endoscopy  Patient will be intubated for procedure due to hemodynamic lability and ANGELINA       Patient wife will be contacted    Cc time 30min  CC time does not include procedures

## 2019-02-22 NOTE — PLAN OF CARE
Problem: Potential for Falls  Goal: Patient will remain free of falls  Description  INTERVENTIONS:  - Assess patient frequently for physical needs  -  Identify cognitive and physical deficits and behaviors that affect risk of falls  -  [place] fall precautions as indicated by assessment   - Educate patient/family on patient safety including physical limitations  - Instruct patient to call for assistance with activity based on assessment  - Modify environment to reduce risk of injury  - Consider OT/PT consult to assist with strengthening/mobility    Outcome: Progressing     Problem: Prexisting or High Potential for Compromised Skin Integrity  Goal: Skin integrity is maintained or improved  Description  INTERVENTIONS:  - Identify patients at risk for skin breakdown  - Assess and monitor skin integrity  - Assess and monitor nutrition and hydration status  - Monitor labs (i e  albumin)  - Assess for incontinence   - Turn and reposition patient  - Assist with mobility/ambulation  - Relieve pressure over bony prominences  - Avoid friction and shearing  - Provide appropriate hygiene as needed including keeping skin clean and dry  - Evaluate need for skin moisturizer/barrier cream  - Collaborate with interdisciplinary team (i e  Nutrition, Rehabilitation, etc )   - Patient/family teaching   Outcome: Progressing     Problem: Nutrition/Hydration-ADULT  Goal: Nutrient/Hydration intake appropriate for improving, restoring or maintaining nutritional needs  Description  Monitor and assess patient's nutrition/hydration status for malnutrition (ex- brittle hair, bruises, dry skin, pale skin and conjunctiva, muscle wasting, smooth red tongue, and disorientation)  Collaborate with interdisciplinary team and initiate plan and interventions as ordered  Monitor patient's weight and dietary intake as ordered or per policy  Utilize nutrition screening tool and intervene per policy   Determine patient's food preferences and provide high-protein, high-caloric foods as appropriate  INTERVENTIONS:  - Monitor oral intake, urinary output, labs, and treatment plans  - Assess nutrition and hydration status and recommend course of action  - Evaluate amount of meals eaten  - Assist patient with eating if necessary   - Allow adequate time for meals  - Recommend/ encourage appropriate diets, oral nutritional supplements, and vitamin/mineral supplements  - Order, calculate, and assess calorie counts as needed  - Recommend, monitor, and adjust tube feedings and TPN/PPN based on assessed needs  - Assess need for intravenous fluids  - Provide specific nutrition/hydration education as appropriate  - Include patient/family/caregiver in decisions related to nutrition   Outcome: Progressing     Problem: CARDIOVASCULAR - ADULT  Goal: Maintains optimal cardiac output and hemodynamic stability  Description  INTERVENTIONS:  - Monitor I/O, vital signs and rhythm  - Monitor for S/S and trends of decreased cardiac output i e  bleeding, hypotension  - Administer and titrate ordered vasoactive medications to optimize hemodynamic stability  - Assess quality of pulses, skin color and temperature  - Assess for signs of decreased coronary artery perfusion - ex   Angina  - Instruct patient to report change in severity of symptoms   Outcome: Progressing  Goal: Absence of cardiac dysrhythmias or at baseline rhythm  Description  INTERVENTIONS:  - Continuous cardiac monitoring, monitor vital signs, obtain 12 lead EKG if indicated  - Administer antiarrhythmic and heart rate control medications as ordered  - Monitor electrolytes and administer replacement therapy as ordered   Outcome: Progressing     Problem: METABOLIC, FLUID AND ELECTROLYTES - ADULT  Goal: Electrolytes maintained within normal limits  Description  INTERVENTIONS:  - Monitor labs and assess patient for signs and symptoms of electrolyte imbalances  - Administer electrolyte replacement as ordered  - Monitor response to electrolyte replacements, including repeat lab results as appropriate  - Instruct patient on fluid and nutrition as appropriate   Outcome: Progressing  Goal: Fluid balance maintained  Description  INTERVENTIONS:  - Monitor labs and assess for signs and symptoms of volume excess or deficit  - Monitor I/O and WT  - Instruct patient on fluid and nutrition as appropriate   Outcome: Progressing     Problem: PAIN - ADULT  Goal: Verbalizes/displays adequate comfort level or baseline comfort level  Description  Interventions:  - Encourage patient to monitor pain and request assistance  - Assess pain using appropriate pain scale  - Administer analgesics based on type and severity of pain and evaluate response  - Implement non-pharmacological measures as appropriate and evaluate response  - Consider cultural and social influences on pain and pain management  - Notify physician/advanced practitioner if interventions unsuccessful or patient reports new pain   Outcome: Progressing     Problem: INFECTION - ADULT  Goal: Absence or prevention of progression during hospitalization  Description  INTERVENTIONS:  - Assess and monitor for signs and symptoms of infection  - Monitor lab/diagnostic results  - Monitor all insertion sites, i e  indwelling lines, tubes, and drains  - Monitor endotracheal (as able) and nasal secretions for changes in amount and color  - [place] appropriate cooling/warming therapies per order  - Administer medications as ordered  - Instruct and encourage patient and family to use good hand hygiene technique  - Identify and instruct in appropriate isolation precautions for identified infection/condition    Outcome: Progressing     Problem: SAFETY ADULT  Goal: Patient will remain free of falls  Description  INTERVENTIONS:  - Assess patient frequently for physical needs  -  Identify cognitive and physical deficits and behaviors that affect risk of falls    -  [place] fall precautions as indicated by assessment   - Educate patient/family on patient safety including physical limitations  - Instruct patient to call for assistance with activity based on assessment  - Modify environment to reduce risk of injury  - Consider OT/PT consult to assist with strengthening/mobility    Outcome: Progressing  Goal: Maintain or return to baseline ADL function  Description  INTERVENTIONS:  -  Assess patient's ability to carry out ADLs; assess patient's baseline for ADL function and identify physical deficits which impact ability to perform ADLs (bathing, care of mouth/teeth, toileting, grooming, dressing, etc )  - Assess/evaluate cause of self-care deficits   - Assess range of motion  - Assess patient's mobility; develop plan if impaired  - Assess patient's need for assistive devices and provide as appropriate  - Encourage maximum independence but intervene and supervise when necessary  ¯ Involve family in performance of ADLs  ¯ Assess for home care needs following discharge   ¯ Request OT consult to assist with ADL evaluation and planning for discharge  ¯ Provide patient education as appropriate    Outcome: Progressing  Goal: Maintain or return mobility status to optimal level  Description  INTERVENTIONS:  - Assess patient's baseline mobility status (ambulation, transfers, stairs, etc )    - Identify cognitive and physical deficits and behaviors that affect mobility  - Identify mobility aids required to assist with transfers and/or ambulation (gait belt, sit-to-stand, lift, walker, cane, etc )  - [place] fall precautions as indicated by assessment  - Record patient progress and toleration of activity level on Mobility SBAR; progress patient to next Phase/Stage  - Instruct patient to call for assistance with activity based on assessment  - Request Rehabilitation consult to assist with strengthening/weightbearing, etc     Outcome: Progressing     Problem: DISCHARGE PLANNING  Goal: Discharge to home or other facility with appropriate resources  Description  INTERVENTIONS:  - Identify barriers to discharge w/patient and caregiver  - Arrange for needed discharge resources and transportation as appropriate  - Identify discharge learning needs (meds, wound care, etc )  - Arrange for interpretive services to assist at discharge as needed  - Refer to Case Management Department for coordinating discharge planning if the patient needs post-hospital services based on physician/advanced practitioner order or complex needs related to functional status, cognitive ability, or social support system   Outcome: Progressing     Problem: Knowledge Deficit  Goal: Patient/family/caregiver demonstrates understanding of disease process, treatment plan, medications, and discharge instructions  Description  Complete learning assessment and assess knowledge base    Interventions:  - Provide teaching at level of understanding  - Provide teaching via preferred learning methods   Outcome: Progressing     Problem: GENITOURINARY - ADULT  Goal: Maintains or returns to baseline urinary function  Description  INTERVENTIONS:  - Assess urinary function  - Encourage oral fluids to ensure adequate hydration  - Administer IV fluids as ordered to ensure adequate hydration  - Administer ordered medications as needed  - Offer frequent toileting  - Follow urinary retention protocol if ordered   Outcome: Progressing  Goal: Absence of urinary retention  Description  INTERVENTIONS:  - Assess patient?s ability to void and empty bladder  - Monitor I/O  - Bladder scan as needed  - Discuss with physician/AP medications to alleviate retention as needed  - Discuss catheterization for long term situations as appropriate   Outcome: Progressing     Problem: DISCHARGE PLANNING - CARE MANAGEMENT  Goal: Discharge to post-acute care or home with appropriate resources  Description  INTERVENTIONS:  - Conduct assessment to determine patient/family and health care team treatment goals, and need for post-acute services based on payer coverage, community resources, and patient preferences, and barriers to discharge  - Address psychosocial, clinical, and financial barriers to discharge as identified in assessment in conjunction with the patient/family and health care team  - Arrange appropriate level of post-acute services according to patient's   needs and preference and payer coverage in collaboration with the physician and health care team  - Communicate with and update the patient/family, physician, and health care team regarding progress on the discharge plan  - Arrange appropriate transportation to post-acute venues  - Pt to d/c with appropriate resources when medically stable     Outcome: Progressing

## 2019-02-22 NOTE — PROGRESS NOTES
02/22/19 1400   Clinical Encounter Type   Visited With Health care provider   Routine Visit Follow-up

## 2019-02-22 NOTE — PROGRESS NOTES
Cardiology Progress Note - aSnty Wasserman 60 y o  male MRN: [id number]    Unit/Bed#: [facility] 044-56 Encounter: [id number]  Assessment and plan  1  Mixed shock primarily hypovolemic at this time  2  Normalized LV function 55%  3  Bioprosthetic AVR  4  Type 2 myocardial infarction without ST elevation  5  Acute kidney injury now on dialysis  6  Atrial flutter  7  GI ulcer with significant bleeding    Recommendations:  Had significant gastrointestinal bleeding last night underwent EGD which showed large antral ulcer which was clipped and injected  His rhythm has been atrial flutter he was originally fibrillation with the amiodarone organized into flutter  This was not ventricular tachycardia was 2-1 flutter  He has preserved LV systolic function  Continue with current amiodarone infusion to try to maintain sinus rhythm at this point time  Pressors managed per critical care  Obviously anticoagulation on held at this point time  Subjective:      Sedated on vent nights events were noted  lit Objective:   Vitals: Blood pressure 107/55, pulse 72, temperature 98 °F (36 7 °C), temperature source Axillary, resp  rate 19, height 5' 9" (1 753 m), weight (!) 165 kg (363 lb 12 1 oz), SpO2 100 %  , Body mass index is 53 72 kg/m² ,   Orthostatic Blood Pressures      Most Recent Value   Blood Pressure  107/55 filed at 02/22/2019 0836   Patient Position - Orthostatic VS  Lying filed at 02/21/2019 2093         Systolic (74QKR), AOB:92 , Min:70 , QFQ:469     Diastolic (82IGW), ESY:89, Min:32, Max:82      Intake/Output Summary (Last 24 hours) at 2/22/2019 0933  Last data filed at 2/22/2019 0800  Gross per 24 hour   Intake 6474 39 ml   Output 25 ml   Net 6449 39 ml     Weight (last 2 days)     Date/Time   Weight    02/22/19 0515   165 (363 76)  (Abnormal)     02/21/19 0535   166 (365 74)  (Abnormal)     02/20/19 0600   161 (354 94)  (Abnormal)     02/20/19 0545   161 (354 94)  (Abnormal)                 Telemetry Review: No significant arrhythmias seen on telemetry review  EKG personally reviewed by Jasmeet Dueñas DO  Physical Exam   Constitutional: He appears well-nourished  No distress  He is intubated  HENT:   Head: Atraumatic  Eyes: Pupils are equal, round, and reactive to light  Conjunctivae are normal    Neck: Neck supple  Cardiovascular: Normal rate and regular rhythm  Exam reveals no friction rub  Murmur heard  Pulmonary/Chest: Effort normal  He is intubated  No respiratory distress  He has decreased breath sounds  He has no wheezes  He has rhonchi  He has no rales  Abdominal: Bowel sounds are normal  He exhibits no distension  There is no tenderness  There is no rebound  Musculoskeletal: He exhibits edema  He exhibits no deformity  Neurological: He is unresponsive  No cranial nerve deficit  Skin: Skin is warm and dry  No erythema  Nursing note and vitals reviewed          Laboratory Results:        CBC with diff:   Results from last 7 days   Lab Units 02/22/19  0637 02/22/19  0137 02/21/19  2243 02/21/19  2235 02/21/19  2120 02/21/19  2040 02/21/19  1710 02/20/19  0831  02/18/19  0909 02/17/19  0704   WBC Thousand/uL 22 04* 22 28*  --   --  22 95*  --   --  11 91*  --  10 54* 9 42   HEMOGLOBIN g/dL 8 1* 7 0*  --  7 7* 6 4*  --  5 3* 7 4*   < > 7 0* 7 5*   I STAT HEMOGLOBIN g/dl  --   --  7 1*  --   --  5 8*  --   --   --   --   --    HEMATOCRIT % 24 9* 21 7*  --  23 7* 20 3*  --  18 1* 24 1*   < > 23 1* 25 3*   HEMATOCRIT, ISTAT %  --   --  21*  --   --  17*  --   --   --   --   --    MCV fL 90 88  --   --  90  --   --  87  --  88 87   PLATELETS Thousands/uL 121* 150  --   --  188  --   --  226  --  238 239   MCH pg 29 2 28 5  --   --  28 3  --   --  26 7*  --  26 3* 25 8*   MCHC g/dL 32 5 32 3  --   --  31 5  --   --  30 7*  --  29 9* 29 6*   RDW % 15 8* 15 2*  --   --  16 5*  --   --  16 9*  --  17 3* 17 2*   MPV fL 10 9 10 1  --   --  10 4  --   --  10 0  --  10 8 11 3   NRBC AUTO /100 WBCs 1 --   --   --   --   --   --   --   --  0 0   NRBC /100 WBC 1  --   --   --   --   --   --   --   --   --   --     < > = values in this interval not displayed  CMP:  Results from last 7 days   Lab Units 02/22/19  0637 02/22/19  0137 02/21/19  2243 02/21/19  2120 02/21/19  2040 02/15/19  1358   POTASSIUM mmol/L 4 0 3 9  --  4 1  --  4 2   CHLORIDE mmol/L 100 100  --  100  --  93*   CO2 mmol/L 20* 23  --  25  --  25   CO2, I-STAT mmol/L  --   --  26  --  25  --    BUN mg/dL 82* 77*  --  74*  --  93*   CREATININE mg/dL 4 96* 4 93*  --  5 16*  --  7 14*   GLUCOSE, ISTAT mg/dl  --   --  117  --  115  --    CALCIUM mg/dL 6 7* 7 0*  --  7 3*  --  8 2*   AST U/L  --  12  --  12  --  21   ALT U/L  --  8*  --  <6*  --  7*   ALK PHOS U/L  --  54  --  57  --  72   EGFR ml/min/1 73sq m 12 12  --  11  --  8         BMP:  Results from last 7 days   Lab Units 02/22/19  0637 02/22/19  0137 02/21/19  2243 02/21/19  2120 02/21/19  2040  02/15/19  1358   POTASSIUM mmol/L 4 0 3 9  --  4 1  --   --  4 2   CHLORIDE mmol/L 100 100  --  100  --   --  93*   CO2 mmol/L 20* 23  --  25  --   --  25   CO2, I-STAT mmol/L  --   --  26  --  25  --   --    BUN mg/dL 82* 77*  --  74*  --   --  93*   CREATININE mg/dL 4 96* 4 93*  --  5 16*  --   --  7 14*   GLUCOSE, ISTAT mg/dl  --   --  117  --  115   < >  --    CALCIUM mg/dL 6 7* 7 0*  --  7 3*  --   --  8 2*    < > = values in this interval not displayed  BNP: No results for input(s): BNP in the last 72 hours      Magnesium:   Results from last 7 days   Lab Units 02/22/19  0637 02/22/19  0137 02/21/19 2120   MAGNESIUM mg/dL 2 5 2 4 2 6       Coags:   Results from last 7 days   Lab Units 02/22/19  0637 02/22/19  0137 02/21/19  2120 02/21/19  0511 02/20/19  0831 02/18/19  0909 02/17/19  1027   INR  1 36* 1 48* 2 65* 1 89* 1 49* 1 73* 4 27*       TSH:        Hemoglobin A1C         Lipid Profile:         Cardiac testing:   Results for orders placed during the hospital encounter of 01/23/19 Echo complete with contrast if indicated    Narrative [address] [facility] [address]  [address], [address]  [phone]    Transthoracic Echocardiogram  2D, Doppler, and Color Doppler    Study date:  2019    Patient: Ethan Harris  MR number: [id number]  Account number: [de-identified]  : 1960  Age: 60 years  Gender: Male  Status: Inpatient  Location: Bedside  Height: 72 in  Weight: 339 lb  BP: 89/ 54 mmHg    Indications: chest pain    Diagnoses: R07 9 - Chest pain, unspecified    Sonographer:  MADHU Bajwa  Primary Physician:  Juan J Pacheco DO  Referring Physician:  Lamin Singh DO  Group:  [facility]  Interpreting Physician:  González Sánchez DO    SUMMARY    PROCEDURE INFORMATION:  This was a technically difficult study despite the administration of echo contrast     LEFT VENTRICLE:  Systolic function was markedly reduced  Ejection fraction was estimated to be 30 %  There was severe diffuse hypokinesis with no obvious identifiable regional variations  Wall thickness was moderately increased  Concentric hypertrophy was present  VENTRICULAR SEPTUM:  There was dyssynergic motion  RIGHT VENTRICLE:  The ventricle was mildly dilated  Systolic function was moderately to markedly reduced  LEFT ATRIUM:  The atrium was mildly dilated  RIGHT ATRIUM:  The atrium was mildly dilated  AORTIC VALVE:  A bioprosthesis was present  It was not well visualized  Mean transvalvular gradient 13 mmHg  TRICUSPID VALVE:  There was mild regurgitation  PERICARDIUM:  A small, partially loculated pericardial effusion was identified along the left ventricular free wall  HISTORY: PRIOR HISTORY: Aortic valve prosthesis    PROCEDURE: The procedure was performed at the bedside  This was a routine study  The transthoracic approach was used  The study included complete 2D imaging, complete spectral Doppler, and color Doppler   The heart rate was 80 bpm, at the  start of the study  Intravenous contrast (Definity solution [1 3 ml Definity/8 7ml normal saline solution]) was administered  Intravenous contrast (Definity solution [1 3 ml Definity/8 7ml normal saline solution]) was administered to  opacify the left ventricle and enhance Doppler signals  Echocardiographic views were limited due to low windows and patient on mechanical ventilator  This was a technically difficult study despite the administration of echo contrast     LEFT VENTRICLE: Size was normal  Systolic function was markedly reduced  Ejection fraction was estimated to be 30 %  There was severe diffuse hypokinesis with no obvious identifiable regional variations  Wall thickness was moderately  increased  Concentric hypertrophy was present  DOPPLER: Normal sinus rhythm was absent  The study was not technically sufficient to allow evaluation of LV diastolic function  VENTRICULAR SEPTUM: There was dyssynergic motion  RIGHT VENTRICLE: The ventricle was mildly dilated  Systolic function was moderately to markedly reduced  LEFT ATRIUM: The atrium was mildly dilated  RIGHT ATRIUM: The atrium was mildly dilated  MITRAL VALVE: Not well visualized  DOPPLER: The transmitral velocity was within the normal range  There was no evidence for stenosis  There was no significant regurgitation  AORTIC VALVE: A bioprosthesis was present  It was not well visualized  Mean transvalvular gradient 13 mmHg  DOPPLER: There was no significant regurgitation  TRICUSPID VALVE: The valve structure was normal  There was normal leaflet separation  DOPPLER: The transtricuspid velocity was within the normal range  There was no evidence for stenosis  There was mild regurgitation  The tricuspid jet  envelope definition was inadequate for estimation of RV systolic pressure  PULMONIC VALVE: Leaflets exhibited normal thickness, no calcification, and normal cuspal separation   DOPPLER: The transpulmonic velocity was within the normal range  There was no significant regurgitation  PERICARDIUM: A small, partially loculated pericardial effusion was identified along the left ventricular free wall  The pericardium was normal in appearance  AORTA: The root exhibited normal size  SYSTEM MEASUREMENT TABLES    2D  %FS: 15 83 %  Ao Diam: 3 09 cm  EDV(Teich): 221 88 ml  EF(Teich): 32 54 %  ESV(Teich): 149 69 ml  IVSd: 1 59 cm  LA Diam: 5 18 cm  LVIDd: 6 58 cm  LVIDs: 5 54 cm  LVPWd: 1 43 cm  SV(Teich): 72 19 ml    CW  AV Env  Ti: 233 56 ms  AV VTI: 49 77 cm  AV Vmax: 2 64 m/s  AV Vmax: 2 67 m/s  AV Vmean: 2 13 m/s  AV maxP 95 mmHg  AV maxP 53 mmHg  AV meanP 73 mmHg    PW  LVOT Env  Ti: 215 22 ms  LVOT VTI: 11 41 cm  LVOT Vmax: 0 59 m/s  LVOT Vmax: 0 7 m/s  LVOT Vmean: 0 52 m/s  LVOT maxP 78 mmHg  LVOT maxP mmHg  LVOT meanP 24 mmHg  MV E Deep: 1 01 m/s    [facility]    Prepared and electronically signed by    Ko Davis DO  Signed 2019 10:14:55       Results for orders placed during the hospital encounter of 19   CHON    Narrative [facility] [address]  [facility] [place], [address] [place] [address]  [phone]    Transesophageal Echocardiogram  2D, Doppler, and Color Doppler    Study date:  2019    Patient: Mike Patel  MR number: [id number]  Account number: [de-identified]  : 1960  Age: 60 years  Gender: Male  Status: Inpatient  Location: Bedside  Height: 69 in  Weight: 319 lb  BP: 101/ 54 mmHg    Indications: Bacteremia  Diagnoses: R78 81 - Bacteremia    Sonographer:  Kendra Lake RDCS  Interpreting Physician:  Tyler Blum DO  Primary Physician:  Tavo Eugene DO  Referring Physician:  uJan Aviles DO  Group:  [facility]  Cardiology Fellow:  Nisha Benito MD    IMPRESSIONS:  There was no echocardiographic evidence for valvular vegetation      SUMMARY    LEFT VENTRICLE:  Systolic function was moderately reduced  Ejection fraction was estimated to be 40 %  There was moderate diffuse hypokinesis  Wall thickness was mildly increased  There was mild concentric hypertrophy  RIGHT VENTRICLE:  The size was normal   Systolic function was mildly reduced  LEFT ATRIUM:  The atrium was mildly dilated  ATRIAL SEPTUM:  There was a small patent foramen ovale with left to right shunt identified by color Doppler  RIGHT ATRIUM:  The atrium was mildly dilated  MITRAL VALVE:  There was trace regurgitation  There was no echocardiographic evidence of vegetation  AORTIC VALVE:  A bioprosthesis was present  It exhibited normal function  There was no echocardiographic evidence of vegetation  TRICUSPID VALVE:  There was mild regurgitation  There was no echocardiographic evidence of vegetation  HISTORY: PRIOR HISTORY: Aortic valve replacement, NSTEMI, Cardiomyopathy, Acute kidney injury  PROCEDURE: The procedure was performed at the bedside  This was a routine study  The risks and alternatives of the procedure were explained to the patient's next of kin and informed consent was obtained  The transesophageal approach was  used  The study included complete 2D imaging, complete spectral Doppler, and color Doppler  The heart rate was 113 bpm, at the start of the study  An adult omniplane probe was inserted by the cardiology fellow under direct supervision of  the attending cardiologist  Intubated with ease  One intubation attempt(s)  There was no blood detected on the probe  Image quality was adequate  There were no complications during the procedure  MEDICATIONS: Sedation administered by  bedside nurse  LEFT VENTRICLE: Size was normal  Systolic function was moderately reduced  Ejection fraction was estimated to be 40 %  There was moderate diffuse hypokinesis  Wall thickness was mildly increased  There was mild concentric hypertrophy    DOPPLER: The study was not technically sufficient to allow evaluation of LV diastolic function  RIGHT VENTRICLE: The size was normal  Systolic function was mildly reduced  Wall thickness was normal     LEFT ATRIUM: The atrium was mildly dilated  No thrombus was identified  APPENDAGE: The appendage was small  No thrombus was identified  DOPPLER: The function was normal (normal emptying velocity)  ATRIAL SEPTUM: There was a small patent foramen ovale with left to right shunt identified by color Doppler  RIGHT ATRIUM: The atrium was mildly dilated  No thrombus was identified  MITRAL VALVE: Valve structure was normal  There was normal leaflet separation  There was no echocardiographic evidence of vegetation  DOPPLER: There was trace regurgitation  AORTIC VALVE: A bioprosthesis was present  It exhibited normal function  There was no echocardiographic evidence of vegetation  TRICUSPID VALVE: The valve structure was normal  There was normal leaflet separation  There was no echocardiographic evidence of vegetation  DOPPLER: There was mild regurgitation  PULMONIC VALVE: Leaflets exhibited normal thickness, no calcification, and normal cuspal separation  There was no echocardiographic evidence of vegetation  DOPPLER: There was no significant regurgitation  PERICARDIUM: There was no pericardial effusion seen in the images obtained  AORTA: The root exhibited normal size  There was no atheroma  There was no evidence for dissection  There was no evidence for aneurysm  [facility]    Prepared and electronically signed by    Lea Montgomery DO  Signed 25-Jan-2019 14:01:14       No results found for this or any previous visit  No results found for this or any previous visit      Meds/Allergies   all current active meds have been reviewed  Medications Prior to Admission   Medication    amLODIPine (NORVASC) 5 mg tablet    ascorbic acid (VITAMIN C) 500 MG tablet    aspirin (ECOTRIN) 325 mg EC tablet    fluticasone (FLONASE) 50 mcg/act nasal spray    loratadine (CLARITIN) 10 mg tablet    metoprolol tartrate (LOPRESSOR) 100 mg tablet    potassium chloride (K-DUR,KLOR-CON) 20 mEq tablet    pravastatin (PRAVACHOL) 40 mg tablet    torsemide (DEMADEX) 20 mg tablet         amiodarone 1 mg/min Last Rate: 1 mg/min (02/21/19 2117)   norepinephrine 1-30 mcg/min Last Rate: 20 mcg/min (02/22/19 0846)   pantoprozole (PROTONIX) infusion (Continuous) 8 mg/hr Last Rate: 8 mg/hr (02/22/19 0720)   propofol 5-50 mcg/kg/min Last Rate: 20 mcg/kg/min (02/22/19 0845)   vasopressin (PITRESSIN) in 0 9 % sodium chloride 100 mL 0 04 Units/min Last Rate: 0 04 Units/min (02/22/19 0848)     Assessment:  Principal Problem:    Hypovolemic shock (HCC)  Active Problems:    Stress-induced cardiomyopathy    Acute respiratory failure with hypoxia ([facility])    History of aortic valve replacement with bioprosthetic valve    Atrial fibrillation ([facility])    Staphylococcus aureus bacteremia    Acute blood loss anemia    Leukocytosis    Cerebrovascular accident ([facility] [address] )    Acute systolic CHF (congestive heart failure) ([facility])    Toxic metabolic encephalopathy    Skin rash    Acute on chronic renal failure (HCC)    Hyponatremia    GI bleed    Coagulopathy (HCC)    GI bleeding            ROS

## 2019-02-22 NOTE — PROGRESS NOTES
Progress Note - Critical Care   Ancil Hams 60 y o  male MRN: [id number]  Unit/Bed#: [facility] 518-01 Encounter: [id number]    Assessment: 59 y/o male who was initially admitted on 1/24 as a transfer from [facility] with septic shock 2/2 MSSA bacteremia who now presents with hypovolemic shock, ABLA 2/2 GIB  Patient was initially transferred to [facility] for CVVH 2/2 refractory acidosis  He had a prolonged MICU course during which he was weaned from the ventilator, vasopressors, and was transitioned to IHD  The patient has had clear blood cultures and was being set up for d/c to home with course of vancomycin to be dosed with dialysis  On 2/20 the patient had 2 large volume bowel movements which were described as "dark"  On 2/21 he had 2 episodes of melena associated with a Hgb of 5 4  He was placed on a protonix drip and transfused 2u PRBC, his INR was reversed with 10 vit K  He then went into v-tach requiring a total of 100 lidox3, 150 amiox2, amio infusion, and defibrillation @ 300J x2  The patient returned to NSR however was persistently hypotensive requiring additional 4u PRBC and levophed   His INR was also found to be elevated requiring an additional 10 Vit K and 2 FFP    2/21 L radial A-Line, LIJ Cordis  2/21 Defibrillationx2, lido, amio   2/21 10u PRBC, 4 FFP, 20 vit K  2/22 EGD: Cautery and epi injection of large gastric ulcer in the antrum, clipping of smaller superficial ulcer      Principal Problem:    Hypovolemic shock ([address] )  Active Problems:    Stress-induced cardiomyopathy    Acute respiratory failure with hypoxia ([address] )    History of aortic valve replacement with bioprosthetic valve    Atrial fibrillation (HCC)    Staphylococcus aureus bacteremia    Acute blood loss anemia    Leukocytosis    Cerebrovascular accident ([address] )    Acute systolic CHF (congestive heart failure) (HCC)    Toxic metabolic encephalopathy    Skin rash    Acute on chronic renal failure (HCC)    Hyponatremia    GI bleed    Coagulopathy [facility])    GI bleeding  Resolved Problems:    Acute encephalopathy    NSTEMI (non-ST elevated myocardial infarction) ([facility] [address] )    ESRD (end stage renal disease) (HCC)    Rhabdomyolysis    Cardiogenic shock (HCC)    Septic shock (HCC)    Transaminitis    Thrombocytopenia (HCC)    Hypervolemia    Plan:   · Neuro: AAO to person place and situation prior to intubation, now GCS 11T  · Analgesia: PRN fentanyl 25  · Sedation: Propofol  · Delirium PPX: CAM-ICU, sleep hygiene   · Acute Metabolic Encephalopathy: likely toxic in nature  · Overall appears improved from MICU   · CV:   · Hypovolemic Shock: titrate down vasopressors as able   · Received total 10u PRBC, 2 FFP  · V-tach: s/p de-fibx2, amio 150x2, lido 100x3, and amio infusion   · Now in NSR w/ frequent PVCs  · Cardiology to see today, continue amio infusion  · New onset A-fib: hold BB 2/2 hypotension, hold coumadin 2/2 GIB  · Rhythm control as above  · Hx HPTN: hold antihypertensives  · Systolic Heart Failure  · Lung:   · Acute Respiratory Failure with Hypoxia: continue mechanical ventilation, intubated for airway protection  · Wean to extubate if repeat Hgb stable this afternoon  · GI:   · Melena: 2/2 UGIB  · Large gastric ulcer in antrum of stomach cauterized and injected with epi, smaller adjacent ulcer clipped  · Continue protonix infusion, scheduled reglan, scheduled carafate  · Q6hr Hgb  · NPO  · FEN:   · Fluids: no maintenance  · Electrolytes - Monitor/trend - replete based on deficiencies   · Nutrition: NPO  · :   · PATRICK on CKD: dialysis dependent, did not tolerate dialysis yesterday 2/2 hypotension   · AM labs pending, may require CRRT today 2/2 hypotension   · Nephrology appreciated  · ID:   · MSSA bacteremia: likely pulmonary source  · Cleared blood cx  · Continue vanco, to be dosed after dialysis, last dose 3/10  · Heme:   · ABLA: s/p 10u PRBC  · Coagulopathy: 20 vit K, 4 FFP  · DVT PPX: hold chemoprophylaxis, SCDs  · Endo:   · No active issues  · Msk/Skin: · Turn frequently and reposition  · Disposition: ICU level care     ______________________________________________________________________    HPI/24hr events: Hypotensive requiring 10u PRBC, vasopressors  Refractory v-tach requiring defib, amio, and lido  EGD this AM with large gastric ulcer in antrum, cauterized and injected with epi  Smaller adjacent ulcer clipped    Lines  LIJ Cordis  R SC Permacath  L radial A-line  ETT  Turner    Infusions  Levo 15  Vaso 0 04  Protonix  Propofol 20  ______________________________________________________________________  Physical Exam:  Sheikh Agitation Sedation Scale (RASS): Drowsy  Physical Exam   Constitutional: He appears well-developed and well-nourished  No distress  HENT:   Head: Normocephalic and atraumatic  Mouth/Throat: No oropharyngeal exudate  Eyes: Pupils are equal, round, and reactive to light  EOM are normal  No scleral icterus  Neck: Normal range of motion  Neck supple  No JVD present  No tracheal deviation present  Cardiovascular: Normal rate and normal heart sounds  No murmur heard  NSR w/ frequent PVCs   Pulmonary/Chest: Effort normal and breath sounds normal  No respiratory distress  Abdominal: Soft  He exhibits distension  There is no tenderness  Musculoskeletal: Normal range of motion  He exhibits edema (3+ pitting edema) and deformity (L great toe ecchymotic)  Neurological: He is alert  GCS eye subscore is 4  GCS verbal subscore is 1  GCS motor subscore is 6  Skin: Skin is warm and dry  Capillary refill takes less than 2 seconds       ______________________________________________________________________  Temperature:   Temp (24hrs), Av 5 °F (36 9 °C), Min:97 °F (36 1 °C), Max:99 4 °F (37 4 °C)    Current Temperature: 98 5 °F (36 9 °C)    Vitals:    19 0515 19 0545 19 0623 19 0644   BP: (!) 85/46 93/55 139/73 149/68   Pulse: 82 68 71 70   Resp: 18 17 18 18   Temp: 98 5 °F (36 9 °C) 99 3 °F (37 4 °C) 98 °F (36 7 °C) 98 5 °F (36 9 °C)   TempSrc: Axillary Axillary  Axillary   SpO2: 100% 100%  100%   Weight: (!) 165 kg (363 lb 12 1 oz)      Height:         Arterial Line BP: 96/56       Weights:   IBW: 70 7 kg    Body mass index is 53 72 kg/m²  Weight (last 2 days)     Date/Time   Weight    02/22/19 0515   165 (363 76)  (Abnormal)     02/21/19 0535   166 (365 74)  (Abnormal)     02/20/19 0600   161 (354 94)  (Abnormal)     02/20/19 0545   161 (354 94)  (Abnormal)             Height: 5' 9" (175 3 cm)  Hemodynamic Monitoring:  N/A       Ventilator Settings:   Vent Mode: AC/VC              FiO2 (%): 40       No results found for: PHART, TXJ9YMQ, PO2ART, ATJ3FZX, N8GEBTLF, BEART, SOURCE    Intake and Outputs:  I/O       02/20 0701 - 02/21 0700 02/21 0701 - 02/22 0700    P  O  660 180    I V  (mL/kg) 500 (3) 1047 1 (6 3)    Blood  1800    IV Piggyback  400    Total Intake(mL/kg) 1160 (7) 3427 1 (20 6)    Urine (mL/kg/hr) 0 (0)     Other 2000 260    Stool  0    Total Output 2000 260    Net -840 +3167 1          Unmeasured Stool Occurrence  2 x        UOP: 0-5cc/hour   Nutrition:        Diet Orders   (From admission, onward)            Start     Ordered    02/22/19 0221  Diet NPO  Diet effective now     Question Answer Comment   Diet Type NPO    RD to adjust diet per protocol?  Yes        02/22/19 0220          Labs:   Results from last 7 days   Lab Units 02/22/19  0137 02/21/19  2243 02/21/19  2235 02/21/19  2120  02/20/19  0831  02/18/19  0909 02/17/19  0704   WBC Thousand/uL 22 28*  --   --  22 95*  --  11 91*  --  10 54* 9 42   HEMOGLOBIN g/dL 7 0*  --  7 7* 6 4*   < > 7 4*   < > 7 0* 7 5*   I STAT HEMOGLOBIN g/dl  --  7 1*  --   --   --   --   --   --   --    HEMATOCRIT % 21 7*  --  23 7* 20 3*   < > 24 1*   < > 23 1* 25 3*   HEMATOCRIT, ISTAT %  --  21*  --   --   --   --   --   --   --    PLATELETS Thousands/uL 150  --   --  188  --  226  --  238 239   NEUTROS PCT %  --   --   --   --   --   --   --   --  81*   MONOS PCT %  --   --   --   --   --   --   --   --  7   MONO PCT %  --   --   --   --   --   --   --  3*  --     < > = values in this interval not displayed  Results from last 7 days   Lab Units 02/22/19 0137 02/21/19 2243 02/21/19  2120 02/15/19  1358   POTASSIUM mmol/L 3 9  --  4 1 4 2   CHLORIDE mmol/L 100  --  100 93*   CO2 mmol/L 23  --  25 25   CO2, I-STAT mmol/L  --  26  --   --    BUN mg/dL 77*  --  74* 93*   CREATININE mg/dL 4 93*  --  5 16* 7 14*   CALCIUM mg/dL 7 0*  --  7 3* 8 2*   ALK PHOS U/L 54  --  57 72   ALT U/L 8*  --  <6* 7*   AST U/L 12  --  12 21   GLUCOSE, ISTAT mg/dl  --  117  --   --      Results from last 7 days   Lab Units 02/22/19 0137 02/21/19  2120   MAGNESIUM mg/dL 2 4 2 6     Results from last 7 days   Lab Units 02/22/19  0137 02/21/19  2120   PHOSPHORUS mg/dL 3 7 3 9      Results from last 7 days   Lab Units 02/22/19 0137 02/21/19 2120 02/21/19  0511   INR  1 48* 2 65* 1 89*         0   Lab Value Date/Time    TROPONINI 36 20 (H) 01/24/2019 2137    TROPONINI 36 30 (H) 01/24/2019 1831    TROPONINI 34 90 (H) 01/24/2019 1613    TROPONINI >40 00 (HH) 01/24/2019 0502    TROPONINI >40 00 (HH) 01/24/2019 0207    TROPONINI 36 61 (HH) 01/23/2019 2251    TROPONINI 24 29 ([facility]) 01/23/2019 1850    TROPONINI 14 20 () 01/23/2019 1600     Imaging:   CTA GIB: 1   Small foci of hyperdensity within the gastric antrum/pylorus area concerning for primary site of active hemorrhage given history of melanoma  In addition, there is a small focus of increased density within the rectum where additional active   hemorrhage is not excluded  2   Extensive distention of the entire colon with liquid stool and gas with loss of haustra within the descending colon and rectum  There are areas of pneumatosis seen within the splenic flexure and sigmoid colon  Correlate clinically for C  difficile   colitis, findings may be seen in the setting of toxic megacolon    3   Retroperitoneal stranding along the inferior iliopsoas muscle on the left, correlate for recent instrumentation  Underlying vasculature remains patent  I have personally reviewed pertinent reports  and I have personally reviewed pertinent films in PACS  EKG: NSR, frequent PVCs  Micro:  Blood Culture:   Lab Results   Component Value Date    BLOODCX No Growth After 5 Days  01/27/2019    BLOODCX No Growth After 5 Days  01/27/2019    BLOODCX Staphylococcus aureus (A) 01/26/2019    BLOODCX Staphylococcus aureus (A) 01/26/2019    BLOODCX Staphylococcus aureus (A) 01/24/2019    BLOODCX Staphylococcus aureus (A) 01/24/2019    BLOODCX Staphylococcus aureus (A) 01/23/2019     Urine Culture:   Lab Results   Component Value Date    URINECX 1906-2251 cfu/ml Gram Positive Cocci (A) 01/23/2019     Sputum Culture: No components found for: SPUTUMCX  Wound Culure: No results found for: WOUNDCULT    Lab Results   Component Value Date    BLOODCX No Growth After 5 Days  01/27/2019    BLOODCX No Growth After 5 Days  01/27/2019    BLOODCX Staphylococcus aureus (A) 01/26/2019    BLOODCX Staphylococcus aureus (A) 01/26/2019    URINECX 8826-7015 cfu/ml Gram Positive Cocci (A) 01/23/2019    SPUTUMCULTUR 1+ Growth of Staphylococcus aureus (A) 01/24/2019     Allergies:    Allergies   Allergen Reactions    Penicillins Rash     However, has subsequently tolerated Cefazolin and Cefepime     Medications:   Scheduled Meds:    Current Facility-Administered Medications:  norepinephrine        acetaminophen 650 mg Oral Q6H PRN Dimple Lopez, PA-C    amiodarone 1 mg/min Intravenous Continuous Willodean Ashley, PADANIELLE Last Rate: 1 mg/min (02/21/19 2117)   atorvastatin 40 mg Oral Daily With [facility], PA-C    bisacodyl 10 mg Rectal Daily PRN Dimple Lopez, PA-C    calcium acetate 1,334 mg Oral TID With Meals Dimple Lopez, PA-C    fentanyl citrate (PF)        fentanyl citrate (PF) 50 mcg Intravenous Q2H PRN Willodean Ashley, PA-C    guaiFENesin 600 mg Oral Q12H [facility] [place], [place] hydrocortisone  Topical BID Will HERBERT Jean    [START ON 2/23/2019] iron sucrose 100 mg Intravenous Once per day on Tue Thu Sat Will HERBERT Jean    magnesium sulfate 2 g Intravenous Once Aminata Jordan PA-C    metoclopramide 10 mg Intravenous Q6H [facility] Maeve Florecita Hannah PA-C    midazolam        norepinephrine        norepinephrine 1-30 mcg/min Intravenous Titrated Aminata Jordan PA-C Last Rate: 24 mcg/min (02/22/19 6940)   nystatin  Topical BID Will HERBERT Jean    pantoprozole (PROTONIX) infusion (Continuous) 8 mg/hr Intravenous Continuous Will HERBERT Jean Last Rate: 8 mg/hr (02/21/19 2016)   polyvinyl alcohol 1 drop Both Eyes Q3H PRN Will HERBERT Jean    propofol 5-50 mcg/kg/min Intravenous Titrated Aminata Jordan PA-C Last Rate: 30 mcg/kg/min (02/22/19 0235)   sucralfate 1,000 mg Oral BID AC Fern RASHIDA Hannah PA-C    vancomycin 10 mg/kg (Adjusted) Intravenous After Dialysis Will HERBERT Jean Last Rate: Stopped (02/21/19 1041)   vasopressin (PITRESSIN) in 0 9 % sodium chloride 100 mL 0 04 Units/min Intravenous Continuous Aminata Jordan PA-C Last Rate: 0 04 Units/min (02/22/19 0143)     Continuous Infusions:    amiodarone 1 mg/min Last Rate: 1 mg/min (02/21/19 2117)   norepinephrine 1-30 mcg/min Last Rate: 24 mcg/min (02/22/19 0635)   pantoprozole (PROTONIX) infusion (Continuous) 8 mg/hr Last Rate: 8 mg/hr (02/21/19 2016)   propofol 5-50 mcg/kg/min Last Rate: 30 mcg/kg/min (02/22/19 0643)   vasopressin (PITRESSIN) in 0 9 % sodium chloride 100 mL 0 04 Units/min Last Rate: 0 04 Units/min (02/22/19 0143)     PRN Meds:    acetaminophen 650 mg Q6H PRN   bisacodyl 10 mg Daily PRN   fentanyl citrate (PF) 50 mcg Q2H PRN   polyvinyl alcohol 1 drop Q3H PRN   vancomycin 10 mg/kg (Adjusted) After Dialysis     VTE Pharmacologic Prophylaxis: Reason for no pharmacologic prophylaxis UGIB  VTE Mechanical Prophylaxis: sequential compression device  Invasive lines and devices:   Invasive Devices     Central Venous Catheter Line            CVC Central Lines 02/21/19 less than 1 day          Peripheral Intravenous Line            Peripheral IV 02/19/19 Right Antecubital 2 days    Peripheral IV 02/21/19 Left Antecubital less than 1 day          Arterial Line            Arterial Line 02/21/19 Radial less than 1 day          Hemodialysis Catheter            Permanent HD Catheter  3 days          Airway            ETT  Cuffed 8 mm less than 1 day                   Code Status: Level 1 - Full Code    Portions of the record may have been created with voice recognition software  Occasional wrong word or "sound a like" substitutions may have occurred due to the inherent limitations of voice recognition software  Read the chart carefully and recognize, using context, where substitutions have occurred      Craig Yarbrough PA-C

## 2019-02-22 NOTE — PROGRESS NOTES
Patient has subsequent episode of ventricular tachycardia with hypotension requiring cardioversion with 300 joules x1  Patient transitioned to sinus rhythm with left bundle branch block  Laboratory data returned after receiving 4 units of packed red blood cells with hemoglobin of 7 7  Patient's INR was also noted to be elevated at 2 65  Patient was ordered an additional 2 units of packed red blood cells as well as 2 units of FFP and vitamin K  the patient has not had ongoing melanotic stools  Gastroenterology was contacted regarding need for additional blood transfusions at this time due to patient's hemodynamic lability and cardiac arrhythmias plan to continue to resuscitate with re-evaluation for possible endoscopy in the a m  pending cardiac status  If patient continues with sustained ventricular tachycardia events plan to intubate and sedate to decrease and her to drive  Patient did receive additional 100 mg of lidocaine  Lidocaine infusion will be initiated if patient continues to have ventricular tachycardia  Critical care time 42 minutes

## 2019-02-22 NOTE — PROGRESS NOTES
Called to patient's bedside secondary to ventricular tachycardia that had initially been intermittent and now progressed to sustained with hypotension  Patient is awake and able to answer questions appropriately during this event  He denied shortness of breath or chest pain  Patient had been administered 100 mg of lidocaine without improvement in ventricular tachycardia  Amiodarone had not been administered as patient has a reported allergy of a rash  Patient had been on amiodarone up until the 16th, medication was discontinued secondary to possible rash  Amiodarone 150 mg administered x1 as a bolus without significant improvement in ventricular tachycardia  Patient is also being administered blood during this time secondary to melanotic stool and hemoglobin of 5 3  I-STAT obtained during this time showed an appropriate ABG with hemoglobin of 5 8 while receiving 2nd unit of packed red blood cells  When patient transitioned to sustained ventricular tachycardia additional 100 mg of lidocaine as well as additional magnesium administered  A decision was made to cardiovert patient with 300 joules x1  Patient received fentanyl and Versed for sedation inappropriately converted to normal sinus rhythm with left bundle branch block after administration of shock  Patient was to be initiated on amiodarone infusion  Additional 2 units of blood sent for and levophed initiated presently at 7mcg/min  Patient is to receive IV access with Cordis as he is difficult to obtain peripheral IVs and only has 20 gauge at this time  BiPAP was restarted as patient was administered sedating medications  EKG reviewed with sinus rhythm left bundle branch block  Bedside ultrasound limited secondary to patient's body habitus, no pericardial effusion identified  Patients brandy Almanza updated, does not include cc time  Critical care time 55 minutes

## 2019-02-22 NOTE — RESPIRATORY THERAPY NOTE
respiratory care      02/22/19 0987   Respiratory Assessment   Assessment Type Assess only   General Appearance Sedated   Respiratory Pattern Assisted   Chest Assessment Chest expansion symmetrical   Bilateral Breath Sounds Diminished;Clear   Suction ET Tube   Resp Comments Pt resting comfortably on ACVC settings; ventilator settings changed at this time per order  12bpm/500mL/50%/ + 8 of peep  BS diminished, pt suctioned for scant amounts  Pt not weaned at this time due to underlying issue  Airway Suctioning/Secretions   Suction Type Endotracheal tube   Suction Device Inline   Secretion Amount Scant   Secretion Color Clear; White   Secretion Consistency Thin   Suction Tolerance Tolerated well   Suctioning Adverse Effects None   ETT  Cuffed 8 mm   Placement Date/Time: 02/22/19 (c) 9404   Type: Cuffed  Tube Size: 8 mm  Location: Oral  Insertion attempts: 1  Placement Verification: Chest x-ray;End tidal CO2;Symmetrical chest wall movement  Secured at (cm): 22   Secured at (cm) 23   Measured from Lips   Secured Location Right   Secured by Commercial tube rosales   Site Condition Dry   HI-LO Suction  Intermittent suction   HI-LO Secretions Scant   HI-LO Intervention Patent

## 2019-02-22 NOTE — PROGRESS NOTES
Progress Note - Infectious Disease   Delfino Raymond 60 y o  male MRN: [id number]  Unit/Bed#: [facility] 518-01 Encounter: [id number]      Impression/Plan:  1  MSSA bacteremia   Possibility of endocarditis considered in the setting of bioprosthetic AVR  Gila Hooper no evidence of valvular vegetations   Initial portal of entry may have been related to multiple upper back wounds   CT chest/abdomen/pelvis with no other evidence of acute infection   Possible from pneumonia as sputum culture was positive for MSSA   Bacteremia eventually cleared   Podiatry evaluated patient's feet and no acute issues   Neurology evaluation noted with patient's recent changes in mental status, imaging abnormalities felt to be ischemic and similar compared to prior studies   No plan for MRI  Patient had acute decompensation overnight as below      -will rechallenge patient with Ancef as below  -will dose at 1 g Q 24; if patient is started on CRRT please increase dose to 2 g q 8  -prolonged course of IV antibiotics of at least 6 weeks through 3/10  -repeat CBC and chemistry tomorrow  -continue to monitor fever curve/vitals  -with send blood cultures if patient spikes fever  -ongoing monitoring in the ICU     2  Rash:  Patient recently developed rash over this past week  It is noted to be flat and pruritic  Unclear etiology   Absolute eosinophil count slowly rising   Unclear if it is any particular medication and would question if this is a delayed response to Ancef  Patient's amiodarone was stopped given concern for his rash  Discussed with ICU team and would like to rechallenge with Ancef      -continue to monitor rash on exam  -changed patient back to Ancef as above  -repeat CBC differential tomorrow  -continue to trend fever curve/vitals     3  Acute GI bleed:  Patient had an acute episode of bleeding overnight  He was transferred to the ICU and had emergent endoscopy by GI    He remains intubated on pressors at this time     -ongoing follow-up by GI  -ongoing supportive care as per ICU  -continue antibiotics as above for now  -additional lab monitoring as per ICU      4  History of bioprosthetic AVR   Secondary to degenerative AS   Placed in 2014  Ruben Mendez no evidence of endocarditis   Ongoing follow-up by Cardiology      5  Acute kidney injury   Likely ischemic/septic ATN    Patient remains on hemodialysis   He is status post PermCath placement      -ongoing follow-up by Nephrology  -antibiotics currently dosed with dialysis     6  Leukocytosis:  Patient noted with significantly elevated white blood cell count today and this is likely due to problem 3        -continue to monitor fever curve/vitals  -repeat CBC tomorrow  -continue antibiotics as above     Above plan discussed with critical care attending  ID consult service will formally re-evaluate the patient again over the weekend  Please contact ID attending on call if any additional questions or concerns  Antibiotics:  Vancomycin    24 hour events:  Patient developed bloody stools overnight  He had episode of V-tach, required chemical and electrical cardioversion  Central lines were placed  Emergent endoscopy done overnight, antral ulcer noted  He remains in the ICU  White count elevated at 22  No new micro data  Patient's other vitals are stable    Subjective:  Patient intubated and sedated at this time  Pains on pressors and on ventilator  Objective:  Vitals:  Temp:  [97 °F (36 1 °C)-99 4 °F (37 4 °C)] 98 °F (36 7 °C)  HR:  [] 72  Resp:  [16-29] 20  BP: ()/(32-82) 138/61  SpO2:  [87 %-100 %] 100 %  Temp (24hrs), Av 5 °F (36 9 °C), Min:97 °F (36 1 °C), Max:99 4 °F (37 4 °C)  Current: Temperature: 98 °F (36 7 °C)    Physical Exam:   General Appearance:  Intubated and sedated   Throat: ET tube in place and no lesions noted on patient's lip     Lungs:   Rhonchi noted in all lung fields anteriorly; no wheezes or rales; respirations unlabored   Heart:  Irregular; no murmur, rub or gallop due to body habitus   Abdomen:   Soft, non-tender, non-distended, no bowel sounds  Extremities: No clubbing, cyanosis; stable edema in all the patient's extremities   Skin: No new rashes or lesions on exposed  No new draining wounds noted on exposed  Patient's previously noted rash is slightly less erythematous but remains across the abdomen and to the upper thighs  Labs, Imaging, & Other studies:   All pertinent labs and imaging studies were personally reviewed  Results from last 7 days   Lab Units 02/22/19 0637 02/22/19 0137 02/21/19 2243 02/21/19 2120   WBC Thousand/uL 22 04* 22 28*  --   --  22 95*   HEMOGLOBIN g/dL 8 1* 7 0*  --    < > 6 4*   I STAT HEMOGLOBIN g/dl  --   --  7 1*  --   --    PLATELETS Thousands/uL 121* 150  --   --  188    < > = values in this interval not displayed       Results from last 7 days   Lab Units 02/22/19  0637 02/22/19  0137 02/21/19  2243 02/21/19  2120 02/15/19  1358   POTASSIUM mmol/L 4 0 3 9  --  4 1 4 2   CHLORIDE mmol/L 100 100  --  100 93*   CO2 mmol/L 20* 23  --  25 25   CO2, I-STAT mmol/L  --   --  26  --   --    BUN mg/dL 82* 77*  --  74* 93*   CREATININE mg/dL 4 96* 4 93*  --  5 16* 7 14*   EGFR ml/min/1 73sq m 12 12  --  11 8   GLUCOSE, ISTAT mg/dl  --   --  117  --   --    CALCIUM mg/dL 6 7* 7 0*  --  7 3* 8 2*   AST U/L  --  12  --  12 21   ALT U/L  --  8*  --  <6* 7*   ALK PHOS U/L  --  54  --  57 72

## 2019-02-22 NOTE — PROCEDURES
Intubation  Date/Time: 2/22/2019 5:05 AM  Performed by: Alphonzo Bumpers, PA-C  Authorized by: Alphonzo Bumpers, PA-C     Patient location:  Bedside  Other Assisting Provider: Yes (comment) (Dr Gonsalo Teresa)    Consent:     Consent obtained:  Emergent situation    Consent given by:  Patient    Risks discussed:  Aspiration, dental trauma, hypoxia and death    Alternatives discussed:  No treatment and delayed treatment  Universal protocol:     Procedure explained and questions answered to patient or proxy's satisfaction: yes      Relevant documents present and verified: yes      Test results available and properly labeled: yes      Radiology Images displayed and confirmed  If images not available, report reviewed: yes      Required blood products, implants, devices, and special equipment available: yes      Site/side marked: yes      Immediately prior to procedure, a time out was called: yes      Patient identity confirmed:  Arm band, verbally with patient and hospital-assigned identification number  Pre-procedure details:     Patient status:  Awake    Mallampati score:  1    Pretreatment medications:  Etomidate    Paralytics:  Succinylcholine  Indications:     Indications for intubation: airway protection    Procedure details:     Preoxygenation:  BiPAP    CPR in progress: no      Intubation method:  Oral    Oral intubation technique:  Glidescope    Laryngoscope blade: Mac 4    Tube size (mm):  8 0    Tube type:  Cuffed    Number of attempts:  1    Ventilation between attempts: no      Cricoid pressure: no      Tube visualized through cords: yes    Placement assessment:     ETT to lip:  22    Tube secured with:  ETT rosales    Breath sounds:  Equal and absent over the epigastrium    Placement verification: chest rise, CXR verification, equal breath sounds and ETCO2 detector      CXR findings:  ETT in proper place  Post-procedure details:     Patient tolerance of procedure:   Tolerated well, no immediate complications

## 2019-02-22 NOTE — PROGRESS NOTES
Patient received 6 units rbc with repeat Hb 7 0, 4 additional units rbc on hold plan to transfuse 2 units  Patient did have melanotic stool  Plan to obtain CTA to evaluated source bleed  Patient may need emergent endoscopy despite VT events vs IR vs surgery  Pending CTA plan of intervention will be addressed  Discussed case with acute care surgery, Dr Bao Gallo  will consult       Cc time 37min

## 2019-02-22 NOTE — RESPIRATORY THERAPY NOTE
RT Ventilator Management Note  Ginny Bellamy 60 y o  male MRN: [id number]  Unit/Bed#: [facility] 820-45 Encounter: [id number]      Daily Screen       2/6/2019 0734 2/22/2019 0922          Patient safety screen outcome[de-identified]  Passed  Failed      Not Ready for Weaning due to[de-identified]    Underline problem not resolved              Physical Exam:   Assessment Type: Assess only  General Appearance: Sedated  Respiratory Pattern: Assisted  Chest Assessment: Chest expansion symmetrical  Bilateral Breath Sounds: Diminished, Clear  Suction: ET Tube      Resp Comments: Pt more awake at this time  Will cont to titrate 02 by sats, No abg ordered   Will cont to assess ability to wean qam

## 2019-02-22 NOTE — OP NOTE
OPERATIVE REPORT  PATIENT NAME: Rachid Shore    :  1960  MRN: [id number]  Pt Location: [facility] [address] SHOW ROOM    SURGERY DATE: 2019    Surgeon(s) and Role:     * [facility], DO - Primary     * Bernadette Roman MD - Fellow    Preop Diagnosis:  GI bleeding [K92 2]  Hypovolemic shock ([facility] [address] ) [R57 1]    Post-Op Diagnosis Codes:     * GI bleeding [K92 2]     * Hypovolemic shock ([facility] [address] ) [R57 1]     * Gastric ulcer [531]    Procedure(s) (LRB):  ESOPHAGOGASTRODUODENOSCOPY (EGD)-[facility] (N/A)    Specimen(s):  * No specimens in log *    Estimated Blood Loss:   Minimal    ESOPHAGOGASTRODUODENOSCOPY    PROCEDURE: EGD    SEDATION: Monitored anesthesia care, check anesthesia records    ASA Class: 4    INDICATIONS:  Melena, hemorrhagic shock    CONSENT:  Informed consent was obtained for the procedure, including sedation after explaining the risks and benefits of the procedure  Risks including but not limited to bleeding, perforation, infection, and missed lesion  PREPARATION:   Telemetry, pulse oximetry, blood pressure were monitored throughout the procedure  Patient was identified by myself both verbally and by visual inspection of ID band  DESCRIPTION:   Patient was placed in the left lateral decubitus position and was sedated with the above medication  The gastroscope was introduced in to the oropharynx and the esophagus was intubated under direct visualization  Scope was passed down the esophagus up to 2nd part of the duodenum  A careful inspection was made as the gastroscope was withdrawn, including a retroflexed view of the stomach; findings and interventions are described below  FINDINGS:    #1  Esophagus- LA Class D esophagitis    #2  Stomach- large amounts of old blood and blood clots seen particularly at antrum/pylorus and fundus  Suspected site of bleeding was likely ulcer at pylorus    Large adherent blood clot at antrum/pylorus was not able to be removed with aggressive lavage therefore we proceeded with piecemeal removal of blood clot via rat tooth forceps  No active bleeding visualized  A total of 5 mL of 1:10,000 epinephrine was injected in a circumferential manner around adherent blood clot and ulcer bed  Multiple bipolar probe applications performed to this area  One metallic hemoclip successfully deployed to adjacent area that was oozing  The large ulcer bed with adherent clot was likely the site of his profuse GI bleeding  Area in fundus was not able to be lavaged and this area of the stomach was not completely visualized  Successful placement of OG tube with placement confirmation via auscultation  #3  Duodenum- 1st and 2nd portions of visualized duodenum appeared normal         IMPRESSIONS:    Acute GI bleeding secondary to bleeding ulcer in the antrum/pre pyloric region which is likely stress related due to recent prolonged hospitalization with bleeding brought on by anticoagulation  However other differential includes H pylori  Successful hemostasis achieved utilizing epinephrine, bipolar probe and hemoclip  Placement of orogastric tube placement post procedure confirmation via auscultation    RECOMMENDATIONS:   Keep NPO today  Continue PPI drip for 72 hours  Continue to hold anticoagulation given life-threatening bleed, resumption will need to be determined by risks versus benefits and not today  Continue to monitor H&H and transfuse as needed, hemodynamic support as per critical care team  Consider continued intubation until hemoglobin levels and hemodynamics stabilize  If patient continues to exhibit signs of GI bleeding may need to consider second look EGD later today vs IR/surgical intervention  Will require repeat outpatient EGD in 2-3 months to document healing  Plan discussed with critical care and surgical team who were at the bedside  Family (wife) updated post procedure        COMPLICATIONS:  None; patient tolerated the procedure well     SPECIMENS:  * No specimens in log *    ESTIMATED BLOOD LOSS:  Minimal      Sondra Perrytonjosé antonio Schumacher DO was present throughout the entire procedure from insertion to complete withdrawal of the scope  I performed all interventions myself or oversaw the fellow  Surgical service also at the bedside and they were updated as well as critical care service      [employer], DO

## 2019-02-22 NOTE — PROCEDURES
Arterial Line Insertion  Date/Time: 2/21/2019 10:02 PM  Performed by: Magalis Livingston PA-C  Authorized by: Maglais Livingston PA-C     Patient location:  Bedside  Other Assisting Provider: No    Consent:     Consent obtained:  Emergent situation  Universal protocol:     Patient identity confirmed:  Arm band and hospital-assigned identification number  Indications:     Indications: hemodynamic monitoring and multiple ABGs    Pre-procedure details:     Skin preparation:  Chlorhexidine    Preparation: Patient was prepped and draped in sterile fashion    Anesthesia (see MAR for exact dosages): Anesthesia method:  Local infiltration    Local anesthetic:  Lidocaine 1% w/o epi  Procedure details:     Location / Tip of Catheter:  Radial    Laterality:  Left    Needle gauge:  20 G    Placement technique:  Percutaneous    Number of attempts:  1    Successful placement: yes      Transducer: waveform confirmed    Post-procedure details:     Post-procedure:  Sutured and sterile dressing applied    CMS:  Normal    Patient tolerance of procedure:   Tolerated well, no immediate complications

## 2019-02-23 PROBLEM — E87.1 HYPONATREMIA: Status: RESOLVED | Noted: 2019-02-19 | Resolved: 2019-02-23

## 2019-02-23 LAB
ABO GROUP BLD BPU: NORMAL
ANION GAP SERPL CALCULATED.3IONS-SCNC: 10 MMOL/L (ref 4–13)
ANION GAP SERPL CALCULATED.3IONS-SCNC: 11 MMOL/L (ref 4–13)
ANION GAP SERPL CALCULATED.3IONS-SCNC: 9 MMOL/L (ref 4–13)
BPU ID: NORMAL
BUN SERPL-MCNC: 35 MG/DL (ref 5–25)
BUN SERPL-MCNC: 40 MG/DL (ref 5–25)
BUN SERPL-MCNC: 46 MG/DL (ref 5–25)
BUN SERPL-MCNC: 48 MG/DL (ref 5–25)
BUN SERPL-MCNC: 60 MG/DL (ref 5–25)
CA-I BLD-SCNC: 1.1 MMOL/L (ref 1.12–1.32)
CA-I BLD-SCNC: 1.11 MMOL/L (ref 1.12–1.32)
CA-I BLD-SCNC: 1.14 MMOL/L (ref 1.12–1.32)
CALCIUM SERPL-MCNC: 7.7 MG/DL (ref 8.3–10.1)
CALCIUM SERPL-MCNC: 7.7 MG/DL (ref 8.3–10.1)
CALCIUM SERPL-MCNC: 7.8 MG/DL (ref 8.3–10.1)
CALCIUM SERPL-MCNC: 7.9 MG/DL (ref 8.3–10.1)
CALCIUM SERPL-MCNC: 8 MG/DL (ref 8.3–10.1)
CHLORIDE SERPL-SCNC: 106 MMOL/L (ref 100–108)
CHLORIDE SERPL-SCNC: 106 MMOL/L (ref 100–108)
CHLORIDE SERPL-SCNC: 107 MMOL/L (ref 100–108)
CHLORIDE SERPL-SCNC: 107 MMOL/L (ref 100–108)
CHLORIDE SERPL-SCNC: 108 MMOL/L (ref 100–108)
CO2 SERPL-SCNC: 21 MMOL/L (ref 21–32)
CO2 SERPL-SCNC: 22 MMOL/L (ref 21–32)
CO2 SERPL-SCNC: 23 MMOL/L (ref 21–32)
CO2 SERPL-SCNC: 23 MMOL/L (ref 21–32)
CO2 SERPL-SCNC: 24 MMOL/L (ref 21–32)
CORTIS SERPL-MCNC: 30.5 UG/DL
CREAT SERPL-MCNC: 2.29 MG/DL (ref 0.6–1.3)
CREAT SERPL-MCNC: 2.57 MG/DL (ref 0.6–1.3)
CREAT SERPL-MCNC: 2.88 MG/DL (ref 0.6–1.3)
CREAT SERPL-MCNC: 2.94 MG/DL (ref 0.6–1.3)
CREAT SERPL-MCNC: 3.43 MG/DL (ref 0.6–1.3)
ERYTHROCYTE [DISTWIDTH] IN BLOOD BY AUTOMATED COUNT: 15.6 % (ref 11.6–15.1)
ERYTHROCYTE [DISTWIDTH] IN BLOOD BY AUTOMATED COUNT: 15.9 % (ref 11.6–15.1)
FIBRINOGEN PPP-MCNC: 266 MG/DL (ref 227–495)
GFR SERPL CREATININE-BSD FRML MDRD: 18 ML/MIN/1.73SQ M
GFR SERPL CREATININE-BSD FRML MDRD: 22 ML/MIN/1.73SQ M
GFR SERPL CREATININE-BSD FRML MDRD: 23 ML/MIN/1.73SQ M
GFR SERPL CREATININE-BSD FRML MDRD: 26 ML/MIN/1.73SQ M
GFR SERPL CREATININE-BSD FRML MDRD: 30 ML/MIN/1.73SQ M
GLUCOSE SERPL-MCNC: 104 MG/DL (ref 65–140)
GLUCOSE SERPL-MCNC: 91 MG/DL (ref 65–140)
GLUCOSE SERPL-MCNC: 92 MG/DL (ref 65–140)
GLUCOSE SERPL-MCNC: 97 MG/DL (ref 65–140)
GLUCOSE SERPL-MCNC: 97 MG/DL (ref 65–140)
HCT VFR BLD AUTO: 20.1 % (ref 36.5–49.3)
HCT VFR BLD AUTO: 20.5 % (ref 36.5–49.3)
HCT VFR BLD AUTO: 20.5 % (ref 36.5–49.3)
HCT VFR BLD AUTO: 21.2 % (ref 36.5–49.3)
HGB BLD-MCNC: 6.5 G/DL (ref 12–17)
HGB BLD-MCNC: 6.8 G/DL (ref 12–17)
HGB BLD-MCNC: 7 G/DL (ref 12–17)
HGB BLD-MCNC: 7.1 G/DL (ref 12–17)
HGB BLD-MCNC: 7.7 G/DL (ref 12–17)
HGB BLD-MCNC: 8 G/DL (ref 12–17)
INR PPP: 1.26 (ref 0.86–1.17)
INR PPP: 1.27 (ref 0.86–1.17)
LACTATE SERPL-SCNC: 0.7 MMOL/L (ref 0.5–2)
MAGNESIUM SERPL-MCNC: 1.9 MG/DL (ref 1.6–2.6)
MAGNESIUM SERPL-MCNC: 2.1 MG/DL (ref 1.6–2.6)
MAGNESIUM SERPL-MCNC: 2.2 MG/DL (ref 1.6–2.6)
MCH RBC QN AUTO: 29.5 PG (ref 26.8–34.3)
MCH RBC QN AUTO: 29.7 PG (ref 26.8–34.3)
MCHC RBC AUTO-ENTMCNC: 33.2 G/DL (ref 31.4–37.4)
MCHC RBC AUTO-ENTMCNC: 33.5 G/DL (ref 31.4–37.4)
MCV RBC AUTO: 88 FL (ref 82–98)
MCV RBC AUTO: 90 FL (ref 82–98)
PHOSPHATE SERPL-MCNC: 2 MG/DL (ref 2.7–4.5)
PHOSPHATE SERPL-MCNC: 2 MG/DL (ref 2.7–4.5)
PHOSPHATE SERPL-MCNC: 2.1 MG/DL (ref 2.7–4.5)
PHOSPHATE SERPL-MCNC: 2.2 MG/DL (ref 2.7–4.5)
PHOSPHATE SERPL-MCNC: 2.5 MG/DL (ref 2.7–4.5)
PLATELET # BLD AUTO: 82 THOUSANDS/UL (ref 149–390)
PLATELET # BLD AUTO: 97 THOUSANDS/UL (ref 149–390)
PMV BLD AUTO: 10.2 FL (ref 8.9–12.7)
PMV BLD AUTO: 10.7 FL (ref 8.9–12.7)
POTASSIUM SERPL-SCNC: 3.8 MMOL/L (ref 3.5–5.3)
POTASSIUM SERPL-SCNC: 3.9 MMOL/L (ref 3.5–5.3)
POTASSIUM SERPL-SCNC: 3.9 MMOL/L (ref 3.5–5.3)
POTASSIUM SERPL-SCNC: 4.1 MMOL/L (ref 3.5–5.3)
POTASSIUM SERPL-SCNC: 4.1 MMOL/L (ref 3.5–5.3)
PROTHROMBIN TIME: 15.9 SECONDS (ref 11.8–14.2)
PROTHROMBIN TIME: 16 SECONDS (ref 11.8–14.2)
RBC # BLD AUTO: 2.29 MILLION/UL (ref 3.88–5.62)
RBC # BLD AUTO: 2.41 MILLION/UL (ref 3.88–5.62)
SODIUM SERPL-SCNC: 137 MMOL/L (ref 136–145)
SODIUM SERPL-SCNC: 139 MMOL/L (ref 136–145)
SODIUM SERPL-SCNC: 139 MMOL/L (ref 136–145)
SODIUM SERPL-SCNC: 140 MMOL/L (ref 136–145)
SODIUM SERPL-SCNC: 140 MMOL/L (ref 136–145)
UNIT DISPENSE STATUS: NORMAL
UNIT PRODUCT CODE: NORMAL
UNIT RH: NORMAL
WBC # BLD AUTO: 15.22 THOUSAND/UL (ref 4.31–10.16)
WBC # BLD AUTO: 18.9 THOUSAND/UL (ref 4.31–10.16)

## 2019-02-23 PROCEDURE — 85027 COMPLETE CBC AUTOMATED: CPT | Performed by: STUDENT IN AN ORGANIZED HEALTH CARE EDUCATION/TRAINING PROGRAM

## 2019-02-23 PROCEDURE — 85018 HEMOGLOBIN: CPT | Performed by: PHYSICIAN ASSISTANT

## 2019-02-23 PROCEDURE — 82330 ASSAY OF CALCIUM: CPT | Performed by: INTERNAL MEDICINE

## 2019-02-23 PROCEDURE — 82533 TOTAL CORTISOL: CPT | Performed by: STUDENT IN AN ORGANIZED HEALTH CARE EDUCATION/TRAINING PROGRAM

## 2019-02-23 PROCEDURE — 83735 ASSAY OF MAGNESIUM: CPT | Performed by: INTERNAL MEDICINE

## 2019-02-23 PROCEDURE — 85027 COMPLETE CBC AUTOMATED: CPT | Performed by: PHYSICIAN ASSISTANT

## 2019-02-23 PROCEDURE — 99233 SBSQ HOSP IP/OBS HIGH 50: CPT | Performed by: INTERNAL MEDICINE

## 2019-02-23 PROCEDURE — 94760 N-INVAS EAR/PLS OXIMETRY 1: CPT | Performed by: SOCIAL WORKER

## 2019-02-23 PROCEDURE — 94760 N-INVAS EAR/PLS OXIMETRY 1: CPT

## 2019-02-23 PROCEDURE — 85610 PROTHROMBIN TIME: CPT | Performed by: STUDENT IN AN ORGANIZED HEALTH CARE EDUCATION/TRAINING PROGRAM

## 2019-02-23 PROCEDURE — 99232 SBSQ HOSP IP/OBS MODERATE 35: CPT | Performed by: INTERNAL MEDICINE

## 2019-02-23 PROCEDURE — P9037 PLATE PHERES LEUKOREDU IRRAD: HCPCS

## 2019-02-23 PROCEDURE — 99292 CRITICAL CARE ADDL 30 MIN: CPT | Performed by: STUDENT IN AN ORGANIZED HEALTH CARE EDUCATION/TRAINING PROGRAM

## 2019-02-23 PROCEDURE — P9016 RBC LEUKOCYTES REDUCED: HCPCS

## 2019-02-23 PROCEDURE — 83605 ASSAY OF LACTIC ACID: CPT | Performed by: STUDENT IN AN ORGANIZED HEALTH CARE EDUCATION/TRAINING PROGRAM

## 2019-02-23 PROCEDURE — 84100 ASSAY OF PHOSPHORUS: CPT | Performed by: INTERNAL MEDICINE

## 2019-02-23 PROCEDURE — 80048 BASIC METABOLIC PNL TOTAL CA: CPT | Performed by: PHYSICIAN ASSISTANT

## 2019-02-23 PROCEDURE — P9017 PLASMA 1 DONOR FRZ W/IN 8 HR: HCPCS

## 2019-02-23 PROCEDURE — 90945 DIALYSIS ONE EVALUATION: CPT | Performed by: INTERNAL MEDICINE

## 2019-02-23 PROCEDURE — 83735 ASSAY OF MAGNESIUM: CPT | Performed by: PHYSICIAN ASSISTANT

## 2019-02-23 PROCEDURE — 85014 HEMATOCRIT: CPT | Performed by: PHYSICIAN ASSISTANT

## 2019-02-23 PROCEDURE — P9100 PATHOGEN TEST FOR PLATELETS: HCPCS

## 2019-02-23 PROCEDURE — 0DJ08ZZ INSPECTION OF UPPER INTESTINAL TRACT, VIA NATURAL OR ARTIFICIAL OPENING ENDOSCOPIC: ICD-10-PCS | Performed by: INTERNAL MEDICINE

## 2019-02-23 PROCEDURE — C9113 INJ PANTOPRAZOLE SODIUM, VIA: HCPCS | Performed by: PHYSICIAN ASSISTANT

## 2019-02-23 PROCEDURE — 30233L1 TRANSFUSION OF NONAUTOLOGOUS FRESH PLASMA INTO PERIPHERAL VEIN, PERCUTANEOUS APPROACH: ICD-10-PCS | Performed by: INTERNAL MEDICINE

## 2019-02-23 PROCEDURE — 99291 CRITICAL CARE FIRST HOUR: CPT | Performed by: STUDENT IN AN ORGANIZED HEALTH CARE EDUCATION/TRAINING PROGRAM

## 2019-02-23 PROCEDURE — 84100 ASSAY OF PHOSPHORUS: CPT | Performed by: PHYSICIAN ASSISTANT

## 2019-02-23 PROCEDURE — 85384 FIBRINOGEN ACTIVITY: CPT | Performed by: STUDENT IN AN ORGANIZED HEALTH CARE EDUCATION/TRAINING PROGRAM

## 2019-02-23 PROCEDURE — 94003 VENT MGMT INPAT SUBQ DAY: CPT | Performed by: SOCIAL WORKER

## 2019-02-23 PROCEDURE — 90945 DIALYSIS ONE EVALUATION: CPT

## 2019-02-23 PROCEDURE — 80048 BASIC METABOLIC PNL TOTAL CA: CPT | Performed by: INTERNAL MEDICINE

## 2019-02-23 RX ORDER — POTASSIUM CHLORIDE 14.9 MG/ML
20 INJECTION INTRAVENOUS
Status: COMPLETED | OUTPATIENT
Start: 2019-02-23 | End: 2019-02-23

## 2019-02-23 RX ORDER — FENTANYL CITRATE 50 UG/ML
200 INJECTION, SOLUTION INTRAMUSCULAR; INTRAVENOUS ONCE
Status: COMPLETED | OUTPATIENT
Start: 2019-02-23 | End: 2019-02-23

## 2019-02-23 RX ORDER — MIDAZOLAM HYDROCHLORIDE 1 MG/ML
4 INJECTION INTRAMUSCULAR; INTRAVENOUS ONCE
Status: COMPLETED | OUTPATIENT
Start: 2019-02-23 | End: 2019-02-23

## 2019-02-23 RX ORDER — MAGNESIUM SULFATE 1 G/100ML
1 INJECTION INTRAVENOUS ONCE
Status: COMPLETED | OUTPATIENT
Start: 2019-02-23 | End: 2019-02-23

## 2019-02-23 RX ORDER — POTASSIUM CHLORIDE 29.8 MG/ML
40 INJECTION INTRAVENOUS ONCE
Status: COMPLETED | OUTPATIENT
Start: 2019-02-23 | End: 2019-02-23

## 2019-02-23 RX ADMIN — POTASSIUM CHLORIDE 40 MEQ: 400 INJECTION, SOLUTION INTRAVENOUS at 09:00

## 2019-02-23 RX ADMIN — FENTANYL CITRATE 50 MCG: 50 INJECTION, SOLUTION INTRAMUSCULAR; INTRAVENOUS at 20:48

## 2019-02-23 RX ADMIN — Medication 20000 ML: at 09:25

## 2019-02-23 RX ADMIN — METOCLOPRAMIDE 10 MG: 5 INJECTION, SOLUTION INTRAMUSCULAR; INTRAVENOUS at 17:31

## 2019-02-23 RX ADMIN — POTASSIUM CHLORIDE 20 MEQ: 200 INJECTION, SOLUTION INTRAVENOUS at 21:06

## 2019-02-23 RX ADMIN — Medication 20000 ML: at 05:15

## 2019-02-23 RX ADMIN — POTASSIUM CHLORIDE 20 MEQ: 200 INJECTION, SOLUTION INTRAVENOUS at 20:07

## 2019-02-23 RX ADMIN — CEFAZOLIN SODIUM 2000 MG: 10 INJECTION, POWDER, FOR SOLUTION INTRAVENOUS at 02:22

## 2019-02-23 RX ADMIN — SODIUM PHOSPHATE, MONOBASIC, MONOHYDRATE 6 MMOL: 276; 142 INJECTION, SOLUTION INTRAVENOUS at 10:31

## 2019-02-23 RX ADMIN — SUCRALFATE 1000 MG: 1 SUSPENSION ORAL at 17:00

## 2019-02-23 RX ADMIN — HYDROCORTISONE: 1 CREAM TOPICAL at 08:28

## 2019-02-23 RX ADMIN — Medication 20000 ML: at 00:37

## 2019-02-23 RX ADMIN — METOCLOPRAMIDE 10 MG: 5 INJECTION, SOLUTION INTRAMUSCULAR; INTRAVENOUS at 05:06

## 2019-02-23 RX ADMIN — METOCLOPRAMIDE 10 MG: 5 INJECTION, SOLUTION INTRAMUSCULAR; INTRAVENOUS at 12:27

## 2019-02-23 RX ADMIN — SODIUM PHOSPHATE, MONOBASIC, MONOHYDRATE 6 MMOL: 276; 142 INJECTION, SOLUTION INTRAVENOUS at 15:37

## 2019-02-23 RX ADMIN — CALCIUM GLUCONATE 1 G: 98 INJECTION, SOLUTION INTRAVENOUS at 15:09

## 2019-02-23 RX ADMIN — CEFAZOLIN SODIUM 2000 MG: 10 INJECTION, POWDER, FOR SOLUTION INTRAVENOUS at 18:42

## 2019-02-23 RX ADMIN — SUCRALFATE 1000 MG: 1 SUSPENSION ORAL at 06:09

## 2019-02-23 RX ADMIN — GUAIFENESIN 600 MG: 600 TABLET, EXTENDED RELEASE ORAL at 08:27

## 2019-02-23 RX ADMIN — MIDAZOLAM 4 MG: 1 INJECTION INTRAMUSCULAR; INTRAVENOUS at 16:18

## 2019-02-23 RX ADMIN — PROPOFOL 15 MCG/KG/MIN: 10 INJECTION, EMULSION INTRAVENOUS at 10:27

## 2019-02-23 RX ADMIN — SODIUM CHLORIDE 8 MG/HR: 9 INJECTION, SOLUTION INTRAVENOUS at 05:06

## 2019-02-23 RX ADMIN — SODIUM PHOSPHATE, MONOBASIC, MONOHYDRATE 6 MMOL: 276; 142 INJECTION, SOLUTION INTRAVENOUS at 21:05

## 2019-02-23 RX ADMIN — MAGNESIUM SULFATE HEPTAHYDRATE 1 G: 1 INJECTION, SOLUTION INTRAVENOUS at 20:30

## 2019-02-23 RX ADMIN — CEFAZOLIN SODIUM 2000 MG: 10 INJECTION, POWDER, FOR SOLUTION INTRAVENOUS at 09:39

## 2019-02-23 RX ADMIN — PROPOFOL 30 MCG/KG/MIN: 10 INJECTION, EMULSION INTRAVENOUS at 15:45

## 2019-02-23 RX ADMIN — POLYETHYLENE GLYCOL 3350, SODIUM SULFATE ANHYDROUS, SODIUM BICARBONATE, SODIUM CHLORIDE, POTASSIUM CHLORIDE 4000 ML: 236; 22.74; 6.74; 5.86; 2.97 POWDER, FOR SOLUTION ORAL at 19:46

## 2019-02-23 RX ADMIN — PROPOFOL 20 MCG/KG/MIN: 10 INJECTION, EMULSION INTRAVENOUS at 19:35

## 2019-02-23 RX ADMIN — CALCIUM GLUCONATE 1 G: 98 INJECTION, SOLUTION INTRAVENOUS at 01:19

## 2019-02-23 RX ADMIN — FENTANYL CITRATE 200 MCG: 50 INJECTION, SOLUTION INTRAMUSCULAR; INTRAVENOUS at 16:17

## 2019-02-23 RX ADMIN — GUAIFENESIN 600 MG: 600 TABLET, EXTENDED RELEASE ORAL at 21:34

## 2019-02-23 RX ADMIN — PROPOFOL 15 MCG/KG/MIN: 10 INJECTION, EMULSION INTRAVENOUS at 05:22

## 2019-02-23 RX ADMIN — IRON SUCROSE 100 MG: 20 INJECTION, SOLUTION INTRAVENOUS at 08:46

## 2019-02-23 RX ADMIN — CALCIUM GLUCONATE 1 G: 98 INJECTION, SOLUTION INTRAVENOUS at 10:00

## 2019-02-23 RX ADMIN — HYDROCORTISONE: 1 CREAM TOPICAL at 18:28

## 2019-02-23 RX ADMIN — SODIUM CHLORIDE 8 MG/HR: 9 INJECTION, SOLUTION INTRAVENOUS at 22:30

## 2019-02-23 RX ADMIN — ATORVASTATIN CALCIUM 40 MG: 40 TABLET, FILM COATED ORAL at 16:00

## 2019-02-23 RX ADMIN — SODIUM PHOSPHATE, MONOBASIC, MONOHYDRATE 6 MMOL: 276; 142 INJECTION, SOLUTION INTRAVENOUS at 02:10

## 2019-02-23 RX ADMIN — CALCIUM GLUCONATE 1 G: 94 INJECTION, SOLUTION INTRAVENOUS at 20:17

## 2019-02-23 RX ADMIN — Medication 20000 ML: at 14:30

## 2019-02-23 RX ADMIN — NYSTATIN: 100000 POWDER TOPICAL at 08:28

## 2019-02-23 RX ADMIN — CALCIUM GLUCONATE 1 G: 98 INJECTION, SOLUTION INTRAVENOUS at 18:18

## 2019-02-23 RX ADMIN — Medication 20000 ML: at 19:23

## 2019-02-23 RX ADMIN — NYSTATIN: 100000 POWDER TOPICAL at 18:28

## 2019-02-23 RX ADMIN — Medication 20000 ML: at 23:24

## 2019-02-23 NOTE — PROGRESS NOTES
Progress Note - General Surgery   Santy Wasserman 60 y o  male MRN: [id number]  Unit/Bed#: [facility] 518-01 Encounter: [id number]    Assessment:  61 yo M with prolonged hospital course including cardiogenic & septi shock, MSSA bactermiea, NSTEMI, and renal failure now with GI bleed  S/P massive transfusion and EGD w/ antral ulcer clipping    Hb: 7 --> repeat pending s/p prbc transfusion    Plan:  F/U AM labs, Hb  Check INR  Recommend DDAVP given renal failure  Continue serial Hb, transfuse as needed  May need re-scope if Hb persistently drops  Continue to resuscitate, wean pressors as able  Sedation holidays daily  Protonix gtt  Hold chemical VTE ppx  Surgery on board if operative intervention required    Subjective/Objective   Chief Complaint:     Subjective: Off vaso, levo down to 4  Has had a lot of stool output, appears to be old blood  1 L in rectal tube + 4 x   Received 1 u pRBC overnight for Hb 7, repeat pending    Objective:     Blood pressure 113/52, pulse 84, temperature 98 9 °F (37 2 °C), temperature source Oral, resp  rate (!) 8, height 5' 9" (1 753 m), weight (!) 166 kg (365 lb 1 3 oz), SpO2 99 %  ,Body mass index is 53 91 kg/m²        Intake/Output Summary (Last 24 hours) at 2/23/2019 0618  Last data filed at 2/23/2019 0600  Gross per 24 hour   Intake 5436 87 ml   Output 2933 ml   Net 2503 87 ml       Invasive Devices     Central Venous Catheter Line            CVC Central Lines 02/21/19 1 day          Peripheral Intravenous Line            Peripheral IV 02/21/19 Left Antecubital 1 day    Peripheral IV 02/23/19 Left;Medial Antecubital less than 1 day    Peripheral IV 02/23/19 Right;Upper Arm less than 1 day          Arterial Line            Arterial Line 02/21/19 Radial 1 day          Hemodialysis Catheter            Permanent HD Catheter  4 days          Drain            NG/OG/Enteral Tube 1 day    Rectal Tube With balloon less than 1 day          Airway            ETT  Cuffed 8 mm 1 day Physical Exam:   Gen: Intubated, sedated  Cardio: RRR  Lungs: CTAB  Abd: Soft, non distended, non tender  Rectum; rectal tube in place with dark brown/maroon blood        Lab, Imaging and other studies:  CBC:   Lab Results   Component Value Date    WBC 22 04 (H) 02/22/2019    HGB 7 0 (L) 02/22/2019    HCT 20 5 (L) 02/22/2019    MCV 90 02/22/2019     (L) 02/22/2019    MCH 29 2 02/22/2019    MCHC 32 5 02/22/2019    RDW 15 8 (H) 02/22/2019    MPV 10 9 02/22/2019    NRBC 1 02/22/2019    NRBC 1 02/22/2019   , CMP:   Lab Results   Component Value Date    SODIUM 137 02/22/2019    K 4 1 02/22/2019     02/22/2019    CO2 21 02/22/2019    BUN 60 (H) 02/22/2019    CREATININE 3 43 (H) 02/22/2019    CALCIUM 7 8 (L) 02/22/2019    EGFR 18 02/22/2019     VTE Pharmacologic Prophylaxis: Reason for no pharmacologic prophylaxis GI bleed  VTE Mechanical Prophylaxis: sequential compression device

## 2019-02-23 NOTE — PROGRESS NOTES
Progress Note - Infectious Disease   Lilia Pace 60 y o  male MRN: [id number]  Unit/Bed#: [facility] 518-01 Encounter: [id number]      Impression/Plan:  1  MSSA bacteremia   Possibility of endocarditis considered in the setting of bioprosthetic AVR  Herlene Burn no evidence of valvular vegetations   Initial portal of entry may have been related to multiple upper back wounds   CT chest/abdomen/pelvis with no other evidence of acute infection   Possible from pneumonia as sputum culture was positive for MSSA   Bacteremia eventually cleared   Podiatry evaluated patient's feet and no acute issues   Neurology evaluation noted with patient's recent changes in mental status, imaging abnormalities felt to be ischemic and similar compared to prior studies   No plan for MRI        -continue IV cefazolin  -prolonged course of IV antibiotics of at least 6 weeks through 3/10  -monitor temperature/WBC  -with send blood cultures if patient spikes fever  -ongoing monitoring in the ICU     2  Rash:  Patient recently developed rash over this past week  It is noted to be flat and pruritic  Unclear etiology   Absolute eosinophil count slowly rising   Unclear if it is any particular medication and would question if this is a delayed response to Ancef  Patient's amiodarone was stopped given concern for his rash  Rash is felt to be secondary to amiodarone  IV cefazolin was restarted yesterday  Thus far, rash appears stable       -continue to monitor rash on exam  -monitor WBC  -continue to trend fever curve/vitals     3  Acute GI bleed:  Patient had an acute episode of bleeding overnight  He was transferred to the ICU and had emergent endoscopy by GI  He remains intubated on pressors at this time      -ongoing follow-up by GI  -ongoing supportive care as per ICU  -continue antibiotics as above for now  -additional lab monitoring as per ICU       4  History of bioprosthetic AVR   Secondary to degenerative AS   Placed in July 2014   Herlene Burn no evidence of endocarditis   Ongoing follow-up by Cardiology      5  Acute kidney injury   Likely ischemic/septic ATN    Patient remains on hemodialysis   He is status post PermCath placement      -ongoing follow-up by Nephrology  -antibiotics currently dosed with dialysis     6  Shock  This is most likely secondary to GI bleed and hemorrhagic shock  Clinically, this is not septic shock     -antibiotic plan as in above  -monitor hemodynamics  -pressor support per Critical Care Medicine Service    7  Leukocytosis:  Patient noted with significantly elevated white blood cell count today and this is likely due to problem 3        -continue to monitor fever curve/vitals  -repeat CBC tomorrow  -continue antibiotics as above     Discussed with Critical Care Medicine Service       Antibiotics:  Cefazolin     Subjective:  Patient  remains in ICU  He remains intubated and sedated  Temperature low-grade  Overall status stable  Objective:  Vitals:  Temp:  [98 °F (36 7 °C)-99 6 °F (37 6 °C)] 98 8 °F (37 1 °C)  HR:  [76-99] 90  Resp:  [6-32] 18  BP: (103-126)/(52-61) 103/54  SpO2:  [97 %-100 %] 100 %  Temp (24hrs), Av 8 °F (37 1 °C), Min:98 °F (36 7 °C), Max:99 6 °F (37 6 °C)  Current: Temperature: 98 8 °F (37 1 °C)    Physical Exam:     General: Sedated on ventilator  No response to verbal stimuli  Comfortable  Nontoxic  Neck:  Supple  No mass  No lymphadenopathy  Lungs: Expansion symmetric, stable rhonchi, no rales, no wheezing, respirations unlabored  Heart:  Tachycardic with regular rhythm, S1 and S2 normal, no murmur  Abdomen: Soft, nondistended, non-tender, bowel sounds active all four quadrants,        no masses, no organomegaly  Extremities: Trace edema  No erythema/warmth  No ulcer  Nontender to palpation  Skin:  Diffuse erythematous papular rash  Neuro: Not assessable       Invasive Devices     Central Venous Catheter Line            CVC Central Lines 19 1 day          Peripheral Intravenous Line            Peripheral IV 02/21/19 Left Antecubital 1 day    Peripheral IV 02/23/19 Left;Medial Antecubital less than 1 day    Peripheral IV 02/23/19 Right;Upper Arm less than 1 day          Arterial Line            Arterial Line 02/21/19 Radial 1 day          Hemodialysis Catheter            Permanent HD Catheter  5 days          Drain            NG/OG/Enteral Tube 1 day    Rectal Tube With balloon less than 1 day          Airway            ETT  Cuffed 8 mm 1 day                Labs studies:   I have personally reviewed pertinent labs  Results from last 7 days   Lab Units 02/23/19  1223 02/23/19  0550 02/22/19  2344  02/22/19  0137 02/21/19  2243 02/21/19  2120  02/21/19  2040   POTASSIUM mmol/L 4 1 3 9  3 9 4 1   < > 3 9  --  4 1   < >  --    CHLORIDE mmol/L 108 106  107 106   < > 100  --  100   < >  --    CO2 mmol/L 23 22  23 21   < > 23  --  25   < >  --    CO2, I-STAT mmol/L  --   --   --   --   --  26  --   --  25   BUN mg/dL 40* 46*  48* 60*   < > 77*  --  74*   < >  --    CREATININE mg/dL 2 57* 2 94*  2 88* 3 43*   < > 4 93*  --  5 16*   < >  --    EGFR ml/min/1 73sq m 26 22  23 18   < > 12  --  11   < >  --    GLUCOSE, ISTAT mg/dl  --   --   --   --   --  117  --   --  115   CALCIUM mg/dL 7 7* 7 7*  8 0* 7 8*   < > 7 0*  --  7 3*   < >  --    AST U/L  --   --   --   --  12  --  12  --   --    ALT U/L  --   --   --   --  8*  --  <6*  --   --    ALK PHOS U/L  --   --   --   --  54  --  57  --   --     < > = values in this interval not displayed  Results from last 7 days   Lab Units 02/23/19  0550 02/22/19  2342 02/22/19  1823  02/22/19  0637 02/22/19  0137   WBC Thousand/uL 18 90*  --   --   --  22 04* 22 28*   HEMOGLOBIN g/dL 7 1* 7 0* 8 0*   < > 8 1* 7 0*   PLATELETS Thousands/uL 82*  --   --   --  121* 150    < > = values in this interval not displayed  Imaging Studies:   I have personally reviewed pertinent imaging study reports and images in PACS      EKG, Pathology, and Other Studies:   I have personally reviewed pertinent reports

## 2019-02-23 NOTE — RESPIRATORY THERAPY NOTE
RT Ventilator Management Note  Georges Recio 60 y o  male MRN: [id number]  Unit/Bed#: [facility] 518-01 Encounter: [id number]      Daily Screen       2/23/2019 0821 2/23/2019 0828          Patient safety screen outcome[de-identified]  Failed  Passed      Not Ready for Weaning due to[de-identified]  Underline problem not resolved        Spont breathing trial % for 30 min:    Yes      Spont breathing trial outcome[de-identified]    Passed      RSBI:    51              Physical Exam:   Assessment Type: Assess only  General Appearance: Sleeping  Respiratory Pattern: Assisted  Chest Assessment: Chest expansion symmetrical  Bilateral Breath Sounds: Diminished, Clear      Resp Comments: Pt has clear bs  Sx by RN for scant secretions  Pt placed on psv with the ok from adv  prac  Pt will not be extubated until hgb  increased  Cont to monitor on psv  Will keep peep at 8 for pulm  Vascular congestion on cxr

## 2019-02-23 NOTE — PROGRESS NOTES
Cardiology Progress Note - [place] Oliva 60 y o  male MRN: [id number]    Unit/Bed#: [facility] 935-69 Encounter: [id number]  Assessment and plan  1  Mixed shock primarily hypovolemic at this time  2  Normalized LV function 55%  3  Bioprosthetic AVR  4  Type 2 myocardial infarction without ST elevation  5  Acute kidney injury now on dialysis  6  Atrial flutter  7  GI ulcer with significant bleeding    Recommendations:  Hemoglobin remains stable below he did get another unit of blood  He is maintaining sinus rhythm at this point time  Amiodarone was discontinued secondary to rash suspect due to iodine component  LV function has been preserved on recent echo  Continue to wean pressors as blood pressure will tolerate  Then resume low-dose beta-blocker at that point time  Obviously anticoagulation on held at this point time  Subjective:      Sedated on vent nights events were noted  Patient opens eyes and follows commands  No significant events on telemetry  lit Objective:   Vitals: Blood pressure 119/52, pulse 96, temperature 98 7 °F (37 1 °C), temperature source Oral, resp  rate 17, height 5' 9" (1 753 m), weight (!) 166 kg (365 lb 1 3 oz), SpO2 100 %  , Body mass index is 53 91 kg/m² ,   Orthostatic Blood Pressures      Most Recent Value   Blood Pressure  119/52 filed at 02/23/2019 1017   Patient Position - Orthostatic VS  Lying filed at 02/22/2019 5439         Systolic (22GUM), YPV:718 , Min:113 , PMC:698     Diastolic (83AAG), XPH:83, Min:52, Max:61      Intake/Output Summary (Last 24 hours) at 2/23/2019 1218  Last data filed at 2/23/2019 1130  Gross per 24 hour   Intake 4566 64 ml   Output 3849 ml   Net 717 64 ml     Weight (last 2 days)     Date/Time   Weight    02/23/19 0600   166 (365 08)  (Abnormal)     02/22/19 0515   165 (363 76)  (Abnormal)     02/21/19 0535   166 (365 74)  (Abnormal)                 Telemetry Review: No significant arrhythmias seen on telemetry review     EKG personally reviewed by Denise Díaz, DO  Physical Exam   Constitutional: He appears well-nourished  No distress  He is intubated  HENT:   Head: Atraumatic  Eyes: Pupils are equal, round, and reactive to light  Conjunctivae are normal    Neck: Neck supple  Cardiovascular: Normal rate and regular rhythm  Exam reveals no friction rub  Murmur heard  Pulmonary/Chest: Effort normal  He is intubated  No respiratory distress  He has decreased breath sounds  He has no wheezes  He has rhonchi  He has no rales  Abdominal: Bowel sounds are normal  He exhibits no distension  There is no tenderness  There is no rebound  Musculoskeletal: He exhibits edema  Neurological: He is unresponsive  No cranial nerve deficit  Skin: Skin is warm and dry  No erythema  Nursing note and vitals reviewed          Laboratory Results:        CBC with diff:   Results from last 7 days   Lab Units 02/23/19  0550 02/22/19  2342 02/22/19  1823 02/22/19  1218 02/22/19  3055 02/22/19  0137 02/21/19  2243  02/21/19  2120  02/20/19  0831  02/18/19  0909 02/17/19  0704   WBC Thousand/uL 18 90*  --   --   --  22 04* 22 28*  --   --  22 95*  --  11 91*  --  10 54* 9 42   HEMOGLOBIN g/dL 7 1* 7 0* 8 0* 7 8* 8 1* 7 0*  --    < > 6 4*   < > 7 4*   < > 7 0* 7 5*   I STAT HEMOGLOBIN g/dl  --   --   --   --   --   --  7 1*  --   --    < >  --   --   --   --    HEMATOCRIT % 21 2* 20 5* 23 3* 23 3* 24 9* 21 7*  --    < > 20 3*   < > 24 1*   < > 23 1* 25 3*   HEMATOCRIT, ISTAT %  --   --   --   --   --   --  21*  --   --    < >  --   --   --   --    MCV fL 88  --   --   --  90 88  --   --  90  --  87  --  88 87   PLATELETS Thousands/uL 82*  --   --   --  121* 150  --   --  188  --  226  --  238 239   MCH pg 29 5  --   --   --  29 2 28 5  --   --  28 3  --  26 7*  --  26 3* 25 8*   MCHC g/dL 33 5  --   --   --  32 5 32 3  --   --  31 5  --  30 7*  --  29 9* 29 6*   RDW % 15 6*  --   --   --  15 8* 15 2*  --   --  16 5*  --  16 9*  --  17 3* 17 2*   MPV fL 10 7 --   --   --  10 9 10 1  --   --  10 4  --  10 0  --  10 8 11 3   NRBC AUTO /100 WBCs  --   --   --   --  1  --   --   --   --   --   --   --  0 0   NRBC /100 WBC  --   --   --   --  1  --   --   --   --   --   --   --   --   --     < > = values in this interval not displayed  CMP:  Results from last 7 days   Lab Units 02/23/19  0550 02/22/19  2344 02/22/19  1823 02/22/19  1213 02/22/19  9990 02/22/19  0137 02/21/19 2243 02/21/19 2120 02/21/19  2040   POTASSIUM mmol/L 3 9  3 9 4 1 3 9 3 5 4 0 3 9  --  4 1  --    CHLORIDE mmol/L 106  107 106 104 101 100 100  --  100  --    CO2 mmol/L 22  23 21 20* 19* 20* 23  --  25  --    CO2, I-STAT mmol/L  --   --   --   --   --   --  26  --  25   BUN mg/dL 46*  48* 60* 74* 88* 82* 77*  --  74*  --    CREATININE mg/dL 2 94*  2 88* 3 43* 4 25* 5 26* 4 96* 4 93*  --  5 16*  --    GLUCOSE, ISTAT mg/dl  --   --   --   --   --   --  117  --  115   CALCIUM mg/dL 7 7*  8 0* 7 8* 7 6* 7 4* 6 7* 7 0*  --  7 3*  --    AST U/L  --   --   --   --   --  12  --  12  --    ALT U/L  --   --   --   --   --  8*  --  <6*  --    ALK PHOS U/L  --   --   --   --   --  54  --  57  --    EGFR ml/min/1 73sq m 22  23 18 14 11 12 12  --  11  --          BMP:  Results from last 7 days   Lab Units 02/23/19  0550 02/22/19  2344 02/22/19  1823 02/22/19  1213 02/22/19  0637 02/22/19  0137 02/21/19  2243 02/21/19  2120   POTASSIUM mmol/L 3 9  3 9 4 1 3 9 3 5 4 0 3 9  --  4 1   CHLORIDE mmol/L 106  107 106 104 101 100 100  --  100   CO2 mmol/L 22  23 21 20* 19* 20* 23  --  25   CO2, I-STAT mmol/L  --   --   --   --   --   --  26  --    BUN mg/dL 46*  48* 60* 74* 88* 82* 77*  --  74*   CREATININE mg/dL 2 94*  2 88* 3 43* 4 25* 5 26* 4 96* 4 93*  --  5 16*   GLUCOSE, ISTAT mg/dl  --   --   --   --   --   --  117  --    CALCIUM mg/dL 7 7*  8 0* 7 8* 7 6* 7 4* 6 7* 7 0*  --  7 3*       BNP: No results for input(s): BNP in the last 72 hours      Magnesium:   Results from last 7 days   Lab Units 19  0550 19  2344 19  1823 19  1213 19  0637 19  0137 19  2120   MAGNESIUM mg/dL 2 1  2 1 2 2 2 5 2 8* 2 5 2 4 2 6       Coags:   Results from last 7 days   Lab Units 19  0826 19  6440 19  0137 19  2120 19  0511 19  0831 19  0909   INR  1 27* 1 36* 1 48* 2 65* 1 89* 1 49* 1 73*       TSH:        Hemoglobin A1C         Lipid Profile:         Cardiac testing:   Results for orders placed during the hospital encounter of 19   Echo complete with contrast if indicated    Narrative [address] [place] [address]  [phone]    Transthoracic Echocardiogram  2D, Doppler, and Color Doppler    Study date:  2019    Patient: Randy Benavides  MR number: [id number]  Account number: [de-identified]  : 1960  Age: 60 years  Gender: Male  Status: Inpatient  Location: Bedside  Height: 72 in  Weight: 339 lb  BP: 89/ 54 mmHg    Indications: chest pain    Diagnoses: R07 9 - Chest pain, unspecified    Sonographer:  MADHU Murray  Primary Physician:  Kirstin Shen DO  Referring Physician:  Carlos Maddox DO  Group:  [facility]  Interpreting Physician:  Zhao Donato DO    SUMMARY    PROCEDURE INFORMATION:  This was a technically difficult study despite the administration of echo contrast     LEFT VENTRICLE:  Systolic function was markedly reduced  Ejection fraction was estimated to be 30 %  There was severe diffuse hypokinesis with no obvious identifiable regional variations  Wall thickness was moderately increased  Concentric hypertrophy was present  VENTRICULAR SEPTUM:  There was dyssynergic motion  RIGHT VENTRICLE:  The ventricle was mildly dilated  Systolic function was moderately to markedly reduced  LEFT ATRIUM:  The atrium was mildly dilated  RIGHT ATRIUM:  The atrium was mildly dilated  AORTIC VALVE:  A bioprosthesis was present   It was not well visualized  Mean transvalvular gradient 13 mmHg  TRICUSPID VALVE:  There was mild regurgitation  PERICARDIUM:  A small, partially loculated pericardial effusion was identified along the left ventricular free wall  HISTORY: PRIOR HISTORY: Aortic valve prosthesis    PROCEDURE: The procedure was performed at the bedside  This was a routine study  The transthoracic approach was used  The study included complete 2D imaging, complete spectral Doppler, and color Doppler  The heart rate was 80 bpm, at the  start of the study  Intravenous contrast (Definity solution [1 3 ml Definity/8 7ml normal saline solution]) was administered  Intravenous contrast (Definity solution [1 3 ml Definity/8 7ml normal saline solution]) was administered to  opacify the left ventricle and enhance Doppler signals  Echocardiographic views were limited due to low windows and patient on mechanical ventilator  This was a technically difficult study despite the administration of echo contrast     LEFT VENTRICLE: Size was normal  Systolic function was markedly reduced  Ejection fraction was estimated to be 30 %  There was severe diffuse hypokinesis with no obvious identifiable regional variations  Wall thickness was moderately  increased  Concentric hypertrophy was present  DOPPLER: Normal sinus rhythm was absent  The study was not technically sufficient to allow evaluation of LV diastolic function  VENTRICULAR SEPTUM: There was dyssynergic motion  RIGHT VENTRICLE: The ventricle was mildly dilated  Systolic function was moderately to markedly reduced  LEFT ATRIUM: The atrium was mildly dilated  RIGHT ATRIUM: The atrium was mildly dilated  MITRAL VALVE: Not well visualized  DOPPLER: The transmitral velocity was within the normal range  There was no evidence for stenosis  There was no significant regurgitation  AORTIC VALVE: A bioprosthesis was present  It was not well visualized   Mean transvalvular gradient 13 mmHg  DOPPLER: There was no significant regurgitation  TRICUSPID VALVE: The valve structure was normal  There was normal leaflet separation  DOPPLER: The transtricuspid velocity was within the normal range  There was no evidence for stenosis  There was mild regurgitation  The tricuspid jet  envelope definition was inadequate for estimation of RV systolic pressure  PULMONIC VALVE: Leaflets exhibited normal thickness, no calcification, and normal cuspal separation  DOPPLER: The transpulmonic velocity was within the normal range  There was no significant regurgitation  PERICARDIUM: A small, partially loculated pericardial effusion was identified along the left ventricular free wall  The pericardium was normal in appearance  AORTA: The root exhibited normal size  SYSTEM MEASUREMENT TABLES    2D  %FS: 15 83 %  Ao Diam: 3 09 cm  EDV(Teich): 221 88 ml  EF(Teich): 32 54 %  ESV(Teich): 149 69 ml  IVSd: 1 59 cm  LA Diam: 5 18 cm  LVIDd: 6 58 cm  LVIDs: 5 54 cm  LVPWd: 1 43 cm  SV(Teich): 72 19 ml    CW  AV Env  Ti: 233 56 ms  AV VTI: 49 77 cm  AV Vmax: 2 64 m/s  AV Vmax: 2 67 m/s  AV Vmean: 2 13 m/s  AV maxP 95 mmHg  AV maxP 53 mmHg  AV meanP 73 mmHg    PW  LVOT Env  Ti: 215 22 ms  LVOT VTI: 11 41 cm  LVOT Vmax: 0 59 m/s  LVOT Vmax: 0 7 m/s  LVOT Vmean: 0 52 m/s  LVOT maxP 78 mmHg  LVOT maxP mmHg  LVOT meanP 24 mmHg  MV E Deep: 1 01 m/s    [facility]    Prepared and electronically signed by    Shira Acuña DO  Signed 2019 10:14:55       Results for orders placed during the hospital encounter of 19   CHON    Narrative [facility]  [address]  [phone]    Transesophageal Echocardiogram  2D, Doppler, and Color Doppler    Study date:  2019    Patient: Lona Lares  MR number: [id number]  Account number: [de-identified]  : 1960  Age: 60 years  Gender: Male  Status: Inpatient  Location: Bedside  Height: 69 in  Weight: 319 lb  BP: 101/ 54 mmHg    Indications: Bacteremia  Diagnoses: R78 81 - Bacteremia    Sonographer:  Pankaj Orlando RDCS  Interpreting Physician:  Denise Velasco DO  Primary Physician:  Allyn Hackett DO  Referring Physician:  Kody Raines DO  Group:  [facility]  Cardiology Fellow:  Zayda Garzon MD    IMPRESSIONS:  There was no echocardiographic evidence for valvular vegetation  SUMMARY    LEFT VENTRICLE:  Systolic function was moderately reduced  Ejection fraction was estimated to be 40 %  There was moderate diffuse hypokinesis  Wall thickness was mildly increased  There was mild concentric hypertrophy  RIGHT VENTRICLE:  The size was normal   Systolic function was mildly reduced  LEFT ATRIUM:  The atrium was mildly dilated  ATRIAL SEPTUM:  There was a small patent foramen ovale with left to right shunt identified by color Doppler  RIGHT ATRIUM:  The atrium was mildly dilated  MITRAL VALVE:  There was trace regurgitation  There was no echocardiographic evidence of vegetation  AORTIC VALVE:  A bioprosthesis was present  It exhibited normal function  There was no echocardiographic evidence of vegetation  TRICUSPID VALVE:  There was mild regurgitation  There was no echocardiographic evidence of vegetation  HISTORY: PRIOR HISTORY: Aortic valve replacement, NSTEMI, Cardiomyopathy, Acute kidney injury  PROCEDURE: The procedure was performed at the bedside  This was a routine study  The risks and alternatives of the procedure were explained to the patient's next of kin and informed consent was obtained  The transesophageal approach was  used  The study included complete 2D imaging, complete spectral Doppler, and color Doppler  The heart rate was 113 bpm, at the start of the study   An adult omniplane probe was inserted by the cardiology fellow under direct supervision of  the attending cardiologist  Intubated with ease  One intubation attempt(s)  There was no blood detected on the probe  Image quality was adequate  There were no complications during the procedure  MEDICATIONS: Sedation administered by  bedside nurse  LEFT VENTRICLE: Size was normal  Systolic function was moderately reduced  Ejection fraction was estimated to be 40 %  There was moderate diffuse hypokinesis  Wall thickness was mildly increased  There was mild concentric hypertrophy  DOPPLER: The study was not technically sufficient to allow evaluation of LV diastolic function  RIGHT VENTRICLE: The size was normal  Systolic function was mildly reduced  Wall thickness was normal     LEFT ATRIUM: The atrium was mildly dilated  No thrombus was identified  APPENDAGE: The appendage was small  No thrombus was identified  DOPPLER: The function was normal (normal emptying velocity)  ATRIAL SEPTUM: There was a small patent foramen ovale with left to right shunt identified by color Doppler  RIGHT ATRIUM: The atrium was mildly dilated  No thrombus was identified  MITRAL VALVE: Valve structure was normal  There was normal leaflet separation  There was no echocardiographic evidence of vegetation  DOPPLER: There was trace regurgitation  AORTIC VALVE: A bioprosthesis was present  It exhibited normal function  There was no echocardiographic evidence of vegetation  TRICUSPID VALVE: The valve structure was normal  There was normal leaflet separation  There was no echocardiographic evidence of vegetation  DOPPLER: There was mild regurgitation  PULMONIC VALVE: Leaflets exhibited normal thickness, no calcification, and normal cuspal separation  There was no echocardiographic evidence of vegetation  DOPPLER: There was no significant regurgitation  PERICARDIUM: There was no pericardial effusion seen in the images obtained  AORTA: The root exhibited normal size  There was no atheroma  There was no evidence for dissection  There was no evidence for aneurysm  [facility]    Prepared and electronically signed by    Art Jason DO  Signed 25-Jan-2019 14:01:14       No results found for this or any previous visit  No results found for this or any previous visit      Meds/Allergies   all current active meds have been reviewed  Medications Prior to Admission   Medication    amLODIPine (NORVASC) 5 mg tablet    ascorbic acid (VITAMIN C) 500 MG tablet    aspirin (ECOTRIN) 325 mg EC tablet    fluticasone (FLONASE) 50 mcg/act nasal spray    loratadine (CLARITIN) 10 mg tablet    metoprolol tartrate (LOPRESSOR) 100 mg tablet    potassium chloride (K-DUR,KLOR-CON) 20 mEq tablet    pravastatin (PRAVACHOL) 40 mg tablet    torsemide (DEMADEX) 20 mg tablet         norepinephrine 1-30 mcg/min Last Rate: Stopped (02/23/19 1028)   NxStage K 4/Ca 3 20,000 mL Last Rate: 0 mL (02/23/19 0514)   pantoprozole (PROTONIX) infusion (Continuous) 8 mg/hr Last Rate: 8 mg/hr (02/23/19 0506)   propofol 5-50 mcg/kg/min Last Rate: 15 mcg/kg/min (02/23/19 1027)   vasopressin (PITRESSIN) in 0 9 % sodium chloride 100 mL 0 04 Units/min Last Rate: Stopped (02/22/19 2232)     Assessment:  Principal Problem:    Hypovolemic shock (HCC)  Active Problems:    Stress-induced cardiomyopathy    Acute respiratory failure with hypoxia (HCC)    History of aortic valve replacement with bioprosthetic valve    Atrial fibrillation (HCC)    Staphylococcus aureus bacteremia    Acute blood loss anemia    Leukocytosis    Cerebrovascular accident ([facility] [address] )    Acute systolic CHF (congestive heart failure) (HCC)    Toxic metabolic encephalopathy    Skin rash    Acute on chronic renal failure (HCC)    GI bleed    Coagulopathy (HCC)            ROS

## 2019-02-23 NOTE — PROGRESS NOTES
Asked to see the patient regarding ongoing maroon liquid stool in the rectal bag  Hemoglobin 7 0 last night  The patient received 2 units of packed red blood cells  Hemoglobin decreased to 6 5  Concern for ongoing GI bleeding from antral ulcer  For a repeat endoscopy

## 2019-02-23 NOTE — PROGRESS NOTES
Progress Note - Critical Care   Maryjo Alfredo 60 y o  male MRN: [id number]  Unit/Bed#: [facility] 674-53 Encounter: [id number]    Attending Physician: Sommer Yeager MD      ______________________________________________________________________  Assessment and Plan:   Principal Problem:    Hypovolemic shock [facility])  Active Problems:    GI bleed    Acute blood loss anemia    Stress-induced cardiomyopathy    Acute respiratory failure with hypoxia ([facility] )    History of aortic valve replacement with bioprosthetic valve    Atrial fibrillation ([place] [address] )    Staphylococcus aureus bacteremia    Leukocytosis    Cerebrovascular accident ([place] [address] )    Acute systolic CHF (congestive heart failure) ([facility])    Toxic metabolic encephalopathy    Skin rash    Acute on chronic renal failure (formerly [facility])    Coagulopathy (formerly [facility])  Resolved Problems:    Acute encephalopathy    NSTEMI (non-ST elevated myocardial infarction) ([place] [address] )    ESRD (end stage renal disease) (formerly [facility])    Rhabdomyolysis    Cardiogenic shock (formerly [facility])    Septic shock (formerly [facility])    Transaminitis    Thrombocytopenia (formerly [facility])    Hypervolemia    Hyponatremia    Plan:    Neuro:   · Sedation plan/Daily sedation holiday: propofol  · RASS goal: -1 Drowsy and 0 Alert and Calm  · Pain controlled with:   · Tylenol PRN  · Fentanyl 50 mcg IV q2h PRN (0 doses/24h)  · Delirium Precautions  · CAM ICU per protocol  · Regulate sleep/wake cycle+  · Trend neuro exam  CV:   · Hemodynamic infusions: Levophed, 2 mcg/min  · Wean levophed as able  · MAP goal > 65  · Rhythm: NSR  · Follow rhythm on telemetry  Lung:   · Daily SBT assessment per protocol: attempt today  · Chlorhexidine/HOB>30degrees ordered: yes  · Pulmonary toileting  · Wean vent as able  GI:   · Acute GI bleed 2/2 gastric ulcer: protonix drip/carafate/reglan per GI, consider repeat EGD prior to extubation given down trend in hgb  FEN:   · Goal 24 hour fluid balance: even  · Nutrition/diet plan: NPO  · Replete electrolytes with goals: K >4 0, Mag >2 0, and Phos >3 0  : · Indwelling Turner present: no   · Strict I and O  · CRRT running even, appreciate nephrology's recommendations, can likely d/c tomorrow  ID:   · Abx ordered: Ancef  · MSSA bacteremia  · ID following   · Trend temps and WBC count  Heme:   · Trend hgb and plts  · Transfuse as needed for goal hgb >7  · Continue to trend q6h, transfusing 1 prbcs now  Endo:   · Glycemic control plan: Blood glucose controlled on current regimen  MSK/Skin:  · Frequent turning and pressure off-loading  Lines:  · Central venous access: Cordis catheter  · Keep central line today for no peripheral access  · Arterial line: Assessed  Continued for the following reasons hemodynamic monitoring  VTE Prophylaxis:  · Pharmacologic Prophylaxis: Sequential compression device (Venodyne)   · Mechanical Prophylaxis: reason for no mechanical VTE prophylaxis GI bleed    Disposition: Continue ICU care      Code Status: Level 1 - Full Code    ______________________________________________________________________    Chief Complaint: Patient intubated, denies any pain or difficulty breathing     24 Hour Events: Improved hemodynamics overnight, vasopressin discontinued and levophed weaned to 6 mcg/min  Received 1 unit PRBCs overnight  Review of Systems  ______________________________________________________________________    Physical Exam:   Physical Exam   Constitutional: He appears well-developed and well-nourished  HENT:   Head: Normocephalic and atraumatic  Eyes: Pupils are equal, round, and reactive to light  Neck: Neck supple  Cardiovascular: Normal rate and regular rhythm  Pulmonary/Chest: Effort normal and breath sounds normal    Abdominal: Soft  Bowel sounds are normal    Obese  Rectal tube in place with melanotic stool   Musculoskeletal: He exhibits edema (1+)  Neurological: GCS eye subscore is 3  GCS verbal subscore is 1  GCS motor subscore is 6  Grossly nonfocal neuro exam    Skin: Skin is warm     Diffuse macular rash ______________________________________________________________________  Vitals:    19 0900 19 0930 19 0945 19 1017   BP:    119/52   BP Location:       Pulse:    84   Resp: 12 16 16 14   Temp:    99 °F (37 2 °C)   TempSrc:       SpO2:       Weight:       Height:           Temperature:   Temp (24hrs), Av 9 °F (37 2 °C), Min:98 °F (36 7 °C), Max:99 6 °F (37 6 °C)    Current Temperature: 99 °F (37 2 °C)  Weights:   IBW: 70 7 kg    Body mass index is 53 91 kg/m²  Weight (last 2 days)     Date/Time   Weight    19 0600   166 (365 08)  (Abnormal)     19 0515   165 (363 76)  (Abnormal)     19 0535   166 (365 74)  (Abnormal)                Non-Invasive/Invasive Ventilation Settings:  Respiratory    Lab Data (Last 4 hours)    None         O2/Vent Data (Last 4 hours)       0821  0828         Vent Mode AC/VC CPAP/PS Spont      Resp Rate (BPM) (BPM) 12       Vt (mL) (mL) 500       FIO2 (%) (%) 40       PEEP (cmH2O) (cmH2O) 8       Patient safety screen outcome: Failed Passed      RSBI  51      MV 13 7       FIO2 (%) (%)  40      PEEP (cmH2O) (cmH2O)  8      Pressure Support (cmH2O) (cmH20)  5      MV (Obs)  11      RSBI  57                Intake and Outputs:  I/O        07 -  0700 701 -  07 -  0700    P  O  180      I V  (mL/kg) 1743 8 (10 6) 3190 9 (19 2)     Blood 4100 647     NG/GT  100     IV Piggyback 450 946     Total Intake(mL/kg) 6473 8 (39 2) 4883 9 (29 4)     Urine (mL/kg/hr)  0 (0)     Emesis/NG output  50     Other 260 2007 224    Stool 0 1000     Total Output 260 3057 224    Net +6213 8 +1826 9 -224           Unmeasured Stool Occurrence 3 x 4 x         Nutrition:        Diet Orders   (From admission, onward)            Start     Ordered    19  Diet NPO  Diet effective now     Question Answer Comment   Diet Type NPO    RD to adjust diet per protocol?  Yes        19        Labs:   Results from last 7 days   Lab Units 02/23/19  0550 02/22/19  2342 02/22/19 1823 02/22/19  1218 02/22/19  4981 02/22/19 0137 02/21/19 2243 02/21/19 2120 02/20/19  0831  02/18/19  0909 02/17/19  0704   WBC Thousand/uL 18 90*  --   --   --  22 04* 22 28*  --   --  22 95*  --  11 91*  --  10 54* 9 42   HEMOGLOBIN g/dL 7 1* 7 0* 8 0* 7 8* 8 1* 7 0*  --    < > 6 4*   < > 7 4*   < > 7 0* 7 5*   I STAT HEMOGLOBIN g/dl  --   --   --   --   --   --  7 1*  --   --    < >  --   --   --   --    HEMATOCRIT % 21 2* 20 5* 23 3* 23 3* 24 9* 21 7*  --    < > 20 3*   < > 24 1*   < > 23 1* 25 3*   HEMATOCRIT, ISTAT %  --   --   --   --   --   --  21*  --   --    < >  --   --   --   --    PLATELETS Thousands/uL 82*  --   --   --  121* 150  --   --  188  --  226  --  238 239   NEUTROS PCT %  --   --   --   --   --   --   --   --   --   --   --   --   --  81*   BANDS PCT %  --   --   --   --  2  --   --   --   --   --   --   --   --   --    MONOS PCT %  --   --   --   --   --   --   --   --   --   --   --   --   --  7   MONO PCT %  --   --   --   --  4  --   --   --   --   --   --   --  3*  --     < > = values in this interval not displayed       Results from last 7 days   Lab Units 02/23/19  0550 02/22/19 2344 02/22/19 1823 02/22/19 1213 02/22/19  2189 02/22/19 0137 02/21/19 2243 02/21/19 2120   SODIUM mmol/L 139  139 137 137 134* 134* 136  --  134*   POTASSIUM mmol/L 3 9  3 9 4 1 3 9 3 5 4 0 3 9  --  4 1   CHLORIDE mmol/L 106  107 106 104 101 100 100  --  100   CO2 mmol/L 22  23 21 20* 19* 20* 23  --  25   CO2, I-STAT mmol/L  --   --   --   --   --   --  26  --    ANION GAP mmol/L 11  9 10 13 14* 14* 13  --  9   BUN mg/dL 46*  48* 60* 74* 88* 82* 77*  --  74*   CREATININE mg/dL 2 94*  2 88* 3 43* 4 25* 5 26* 4 96* 4 93*  --  5 16*   CALCIUM mg/dL 7 7*  8 0* 7 8* 7 6* 7 4* 6 7* 7 0*  --  7 3*   ALT U/L  --   --   --   --   --  8*  --  <6*   AST U/L  --   --   --   --   --  12  --  12   ALK PHOS U/L  --   --   --   --   --  54 --  57   ALBUMIN g/dL  --   --   --   --   --  1 9*  --  1 9*   TOTAL BILIRUBIN mg/dL  --   --   --   --   --  0 74  --  0 65     Results from last 7 days   Lab Units 02/23/19  0550 02/22/19  2344 02/22/19  1823 02/22/19  1213 02/22/19  0637 02/22/19  0137 02/21/19  2120   MAGNESIUM mg/dL 2 1  2 1 2 2 2 5 2 8* 2 5 2 4 2 6   PHOSPHORUS mg/dL 2 2*  2 0* 2 0* 2 6* 3 7 3 9 3 7 3 9      Results from last 7 days   Lab Units 02/23/19  0826 02/22/19  0637 02/22/19  0137 02/21/19  2120 02/21/19  0511 02/20/19  0831 02/18/19  0909   INR  1 27* 1 36* 1 48* 2 65* 1 89* 1 49* 1 73*     Micro: Allergies:    Allergies   Allergen Reactions    Penicillins Rash     However, has subsequently tolerated Cefazolin and Cefepime     Medications:   Scheduled Meds:  Current Facility-Administered Medications:  acetaminophen 650 mg Oral Q6H PRN Scott Nash PA-C    atorvastatin 40 mg Oral Daily With Riley Shark HERBERT Foss    bisacodyl 10 mg Rectal Daily PRN Scott Nash PA-C    calcium acetate 1,334 mg Oral TID With Meals Scott Nash PA-C    calcium gluconate 1 G  100 mL IVPB 1 g Intravenous Once Kathy Hunter MD Last Rate: 1 g (02/23/19 1000)   cefazolin 2,000 mg Intravenous Q8H Chele Bernal PA-C Last Rate: 2,000 mg (02/23/19 0939)   fentanyl citrate (PF) 50 mcg Intravenous Q2H PRN Magalis Livingston PA-C    guaiFENesin 600 mg Oral Q12H [facility] Scott Nash PA-C    hydrocortisone  Topical BID Scott Nash PA-C    iron sucrose 100 mg Intravenous Once per day on Tue Thu Sat Scott Nash PA-C Last Rate: 100 mg (02/23/19 0846)   metoclopramide 10 mg Intravenous Q6H [facility] Syd Hannah PA-C    norepinephrine 1-30 mcg/min Intravenous Titrated Magalis Livingston PA-C Last Rate: 4 mcg/min (02/23/19 0520)   NxStage K 4/Ca 3 20,000 mL Dialysis Continuous Jessica Rubio DO Last Rate: 0 mL (02/23/19 0514)   nystatin  Topical BID Scott Nash PA-C    pantoprozole (PROTONIX) infusion (Continuous) 8 mg/hr Intravenous Continuous Maday Hernandez HERBERT Foss Last Rate: 8 mg/hr (02/23/19 0506)   polyvinyl alcohol 1 drop Both Eyes Q3H PRN Nasir Xavier PA-C    potassium chloride 40 mEq Intravenous Once Deisi Mayfield MD Last Rate: 40 mEq (02/23/19 0900)   propofol 5-50 mcg/kg/min Intravenous Titrated Craig Yarbrough PA-C Last Rate: 15 mcg/kg/min (02/23/19 0522)   sodium phosphate 6 mmol Intravenous Once Deisi Mayfield MD    sucralfate 1,000 mg Oral BID AC Fern R HERBERT Hannah    vasopressin (PITRESSIN) in 0 9 % sodium chloride 100 mL 0 04 Units/min Intravenous Continuous Craig Yarbrough PA-C Last Rate: Stopped (02/22/19 2232)     Continuous Infusions:  norepinephrine 1-30 mcg/min Last Rate: 4 mcg/min (02/23/19 0520)   NxStage K 4/Ca 3 20,000 mL Last Rate: 0 mL (02/23/19 0514)   pantoprozole (PROTONIX) infusion (Continuous) 8 mg/hr Last Rate: 8 mg/hr (02/23/19 0506)   propofol 5-50 mcg/kg/min Last Rate: 15 mcg/kg/min (02/23/19 0522)   vasopressin (PITRESSIN) in 0 9 % sodium chloride 100 mL 0 04 Units/min Last Rate: Stopped (02/22/19 2232)     PRN Meds:    acetaminophen 650 mg Q6H PRN   bisacodyl 10 mg Daily PRN   fentanyl citrate (PF) 50 mcg Q2H PRN   polyvinyl alcohol 1 drop Q3H PRN     VTE Pharmacologic Prophylaxis: Pharmacologic VTE Prophylaxis contraindicated due to GI bleed  VTE Mechanical Prophylaxis: sequential compression device  Invasive lines and devices:   Invasive Devices     Central Venous Catheter Line            CVC Central Lines 02/21/19 1 day          Peripheral Intravenous Line            Peripheral IV 02/21/19 Left Antecubital 1 day    Peripheral IV 02/23/19 Left;Medial Antecubital less than 1 day    Peripheral IV 02/23/19 Right;Upper Arm less than 1 day          Arterial Line            Arterial Line 02/21/19 Radial 1 day          Hemodialysis Catheter            Permanent HD Catheter  4 days          Drain            NG/OG/Enteral Tube 1 day    Rectal Tube With balloon less than 1 day          Airway            ETT  Cuffed 8 mm 1 day                     Portions of the record may have been created with voice recognition software  Occasional wrong word or "sound a like" substitutions may have occurred due to the inherent limitations of voice recognition software  Read the chart carefully and recognize, using context, where substitutions have occurred      Elsa Vick PA-C

## 2019-02-23 NOTE — OP NOTE
OPERATIVE REPORT  PATIENT NAME: Rachid Shore    :  1960  MRN: [id number]  Pt Location:  GI ROAD SHOW ROOM    SURGERY DATE: 2019    Surgeon(s) and Role:   Karrie Guzman MD      Preop Diagnosis:  GI bleeding [K92 2]  Hypovolemic shock ([address] ) [R57 1]    Post-Op Diagnosis Codes:     * GI bleeding [K92 2]     * Hypovolemic shock ([address] ) [R57 1]     * Gastric ulcer [531]    Procedure(s) (LRB):  ESOPHAGOGASTRODUODENOSCOPY (EGD)-roads\[facility] [place] [facility]\"" overnight (N/A)    Drains:  NG/OG/Enteral Tube (Active)   Placement Reverification Auscultation 2019  4:00 AM   Site Assessment Clean;Dry; Intact 2019  4:00 AM   Status Suction-low continuous 2019  4:00 AM   Drainage Appearance None 2019  8:00 PM   Intake (mL) 0 mL 2019  3:01 PM   Output (mL) 0 mL 2019  3:01 PM   Number of days: 1       Rectal Tube With balloon (Active)   Rectal Tube Output 100 mL 2019  2:00 PM   Number of days: 1       [REMOVED] NG/OG/Enteral Tube Orogastric 14 Fr Right mouth (Removed)   Placement Reverification Auscultation 2019  4:00 PM   Site Assessment Clean;Dry; Intact 2019  4:00 PM   Status Tube feed infusing 2019  4:00 PM   Drainage Appearance None 2019  7:00 PM   Intake (mL) 30 mL 2019 10:00 PM   Output (mL) 0 mL 2019  7:00 PM   Number of days: 9       [REMOVED] NG/OG/Enteral Tube Orogastric (Removed)   Placement Reverification Auscultation 2019  8:50 AM   Site Assessment Dry; Intact 2019  8:50 AM   Status Clamped 2019  8:50 AM   Drainage Appearance None 2019  3:57 AM   Intake (mL) 100 mL 2019  2:30 PM   Number of days: 5       [REMOVED] Urethral Catheter Latex (Removed)   Amt returned on insertion(mL) 80 mL 2019  5:24 PM   Reasons to continue Urinary Catheter  Accurate I&O assessment in critically ill patients (48 hr  max) 2019  6:05 AM   Goal for Removal No longer needed- Will place order to discontinue 2019  8:04 AM   Site Assessment Clean;Skin intact 1/26/2019  3:45 AM   Collection Container Standard drainage bag 1/26/2019  3:45 AM   Securement Method Securing device (Describe) 1/26/2019  3:45 AM   Output (mL) 0 mL 1/26/2019  7:00 AM   Number of days: 3       Anesthesia Type:   IV Sedation with Anesthesia    Operative Indications:  GI bleeding [K92 2]  Hypovolemic shock (HCC) [R57 1]      Operative Findings:  Esophagus:  Superficial linear abrasions in the mid esophagus caused by an OG tube trauma  Otherwise normal esophagus  Stomach: There was about 1 5 cm deep ulcer in the pyloric area  The ulcer was not bleeding and there were no stigmata of recent bleeding  The previously placed Endoclip was visualized at the ulcer rim  The rest of the stomach was normal   There was no blood and no clots in the stomach  Duodenum:  Normal duodenum  Complications: There were no complications  Procedure and Technique:  Verbal consent was obtained from the patient's wife  The endoscope was easily advanced into the 2nd portion of the duodenum  The views were excellent  The patient's toleration of the procedure was excellent  Impressions: Worsening anemia, ongoing maroon bowel movements  Possibly related to recent major GI bleeding from an antral ulcer  Concomitant bleeding from a lower GI (diverticuli, ischemic bowel) disease should also be considered  Patient Disposition:  Continue to monitor  GoLYTELY through the OG tube for possible colonoscopy if bleeding continues        SIGNATURE: Caroline Rosas MD  DATE: February 23, 2019  TIME: 4:21 PM

## 2019-02-23 NOTE — PLAN OF CARE
Problem: Potential for Falls  Goal: Patient will remain free of falls  Description  INTERVENTIONS:  - Assess patient frequently for physical needs  -  Identify cognitive and physical deficits and behaviors that affect risk of falls  -  [place] fall precautions as indicated by assessment   - Educate patient/family on patient safety including physical limitations  - Instruct patient to call for assistance with activity based on assessment  - Modify environment to reduce risk of injury  - Consider OT/PT consult to assist with strengthening/mobility    Outcome: Progressing     Problem: Prexisting or High Potential for Compromised Skin Integrity  Goal: Skin integrity is maintained or improved  Description  INTERVENTIONS:  - Identify patients at risk for skin breakdown  - Assess and monitor skin integrity  - Assess and monitor nutrition and hydration status  - Monitor labs (i e  albumin)  - Assess for incontinence   - Turn and reposition patient  - Assist with mobility/ambulation  - Relieve pressure over bony prominences  - Avoid friction and shearing  - Provide appropriate hygiene as needed including keeping skin clean and dry  - Evaluate need for skin moisturizer/barrier cream  - Collaborate with interdisciplinary team (i e  Nutrition, Rehabilitation, etc )   - Patient/family teaching   Outcome: Progressing     Problem: Nutrition/Hydration-ADULT  Goal: Nutrient/Hydration intake appropriate for improving, restoring or maintaining nutritional needs  Description  Monitor and assess patient's nutrition/hydration status for malnutrition (ex- brittle hair, bruises, dry skin, pale skin and conjunctiva, muscle wasting, smooth red tongue, and disorientation)  Collaborate with interdisciplinary team and initiate plan and interventions as ordered  Monitor patient's weight and dietary intake as ordered or per policy  Utilize nutrition screening tool and intervene per policy   Determine patient's food preferences and provide high-protein, high-caloric foods as appropriate  INTERVENTIONS:  - Monitor oral intake, urinary output, labs, and treatment plans  - Assess nutrition and hydration status and recommend course of action  - Evaluate amount of meals eaten  - Assist patient with eating if necessary   - Allow adequate time for meals  - Recommend/ encourage appropriate diets, oral nutritional supplements, and vitamin/mineral supplements  - Order, calculate, and assess calorie counts as needed  - Recommend, monitor, and adjust tube feedings and TPN/PPN based on assessed needs  - Assess need for intravenous fluids  - Provide specific nutrition/hydration education as appropriate  - Include patient/family/caregiver in decisions related to nutrition   Outcome: Progressing     Problem: CARDIOVASCULAR - ADULT  Goal: Maintains optimal cardiac output and hemodynamic stability  Description  INTERVENTIONS:  - Monitor I/O, vital signs and rhythm  - Monitor for S/S and trends of decreased cardiac output i e  bleeding, hypotension  - Administer and titrate ordered vasoactive medications to optimize hemodynamic stability  - Assess quality of pulses, skin color and temperature  - Assess for signs of decreased coronary artery perfusion - ex   Angina  - Instruct patient to report change in severity of symptoms   Outcome: Progressing  Goal: Absence of cardiac dysrhythmias or at baseline rhythm  Description  INTERVENTIONS:  - Continuous cardiac monitoring, monitor vital signs, obtain 12 lead EKG if indicated  - Administer antiarrhythmic and heart rate control medications as ordered  - Monitor electrolytes and administer replacement therapy as ordered   Outcome: Progressing     Problem: METABOLIC, FLUID AND ELECTROLYTES - ADULT  Goal: Electrolytes maintained within normal limits  Description  INTERVENTIONS:  - Monitor labs and assess patient for signs and symptoms of electrolyte imbalances  - Administer electrolyte replacement as ordered  - Monitor response to electrolyte replacements, including repeat lab results as appropriate  - Instruct patient on fluid and nutrition as appropriate   Outcome: Progressing  Goal: Fluid balance maintained  Description  INTERVENTIONS:  - Monitor labs and assess for signs and symptoms of volume excess or deficit  - Monitor I/O and WT  - Instruct patient on fluid and nutrition as appropriate   Outcome: Progressing     Problem: PAIN - ADULT  Goal: Verbalizes/displays adequate comfort level or baseline comfort level  Description  Interventions:  - Encourage patient to monitor pain and request assistance  - Assess pain using appropriate pain scale  - Administer analgesics based on type and severity of pain and evaluate response  - Implement non-pharmacological measures as appropriate and evaluate response  - Consider cultural and social influences on pain and pain management  - Notify physician/advanced practitioner if interventions unsuccessful or patient reports new pain   Outcome: Progressing     Problem: INFECTION - ADULT  Goal: Absence or prevention of progression during hospitalization  Description  INTERVENTIONS:  - Assess and monitor for signs and symptoms of infection  - Monitor lab/diagnostic results  - Monitor all insertion sites, i e  indwelling lines, tubes, and drains  - Monitor endotracheal (as able) and nasal secretions for changes in amount and color  - [place] appropriate cooling/warming therapies per order  - Administer medications as ordered  - Instruct and encourage patient and family to use good hand hygiene technique  - Identify and instruct in appropriate isolation precautions for identified infection/condition    Outcome: Progressing     Problem: SAFETY ADULT  Goal: Patient will remain free of falls  Description  INTERVENTIONS:  - Assess patient frequently for physical needs  -  Identify cognitive and physical deficits and behaviors that affect risk of falls    -  [place] fall precautions as indicated by assessment   - Educate patient/family on patient safety including physical limitations  - Instruct patient to call for assistance with activity based on assessment  - Modify environment to reduce risk of injury  - Consider OT/PT consult to assist with strengthening/mobility    Outcome: Progressing  Goal: Maintain or return to baseline ADL function  Description  INTERVENTIONS:  -  Assess patient's ability to carry out ADLs; assess patient's baseline for ADL function and identify physical deficits which impact ability to perform ADLs (bathing, care of mouth/teeth, toileting, grooming, dressing, etc )  - Assess/evaluate cause of self-care deficits   - Assess range of motion  - Assess patient's mobility; develop plan if impaired  - Assess patient's need for assistive devices and provide as appropriate  - Encourage maximum independence but intervene and supervise when necessary  ¯ Involve family in performance of ADLs  ¯ Assess for home care needs following discharge   ¯ Request OT consult to assist with ADL evaluation and planning for discharge  ¯ Provide patient education as appropriate    Outcome: Progressing  Goal: Maintain or return mobility status to optimal level  Description  INTERVENTIONS:  - Assess patient's baseline mobility status (ambulation, transfers, stairs, etc )    - Identify cognitive and physical deficits and behaviors that affect mobility  - Identify mobility aids required to assist with transfers and/or ambulation (gait belt, sit-to-stand, lift, walker, cane, etc )  - [place] fall precautions as indicated by assessment  - Record patient progress and toleration of activity level on Mobility SBAR; progress patient to next Phase/Stage  - Instruct patient to call for assistance with activity based on assessment  - Request Rehabilitation consult to assist with strengthening/weightbearing, etc     Outcome: Progressing     Problem: DISCHARGE PLANNING  Goal: Discharge to home or other facility with appropriate resources  Description  INTERVENTIONS:  - Identify barriers to discharge w/patient and caregiver  - Arrange for needed discharge resources and transportation as appropriate  - Identify discharge learning needs (meds, wound care, etc )  - Arrange for interpretive services to assist at discharge as needed  - Refer to Case Management Department for coordinating discharge planning if the patient needs post-hospital services based on physician/advanced practitioner order or complex needs related to functional status, cognitive ability, or social support system   Outcome: Progressing     Problem: Knowledge Deficit  Goal: Patient/family/caregiver demonstrates understanding of disease process, treatment plan, medications, and discharge instructions  Description  Complete learning assessment and assess knowledge base    Interventions:  - Provide teaching at level of understanding  - Provide teaching via preferred learning methods   Outcome: Progressing     Problem: GENITOURINARY - ADULT  Goal: Maintains or returns to baseline urinary function  Description  INTERVENTIONS:  - Assess urinary function  - Encourage oral fluids to ensure adequate hydration  - Administer IV fluids as ordered to ensure adequate hydration  - Administer ordered medications as needed  - Offer frequent toileting  - Follow urinary retention protocol if ordered   Outcome: Progressing  Goal: Absence of urinary retention  Description  INTERVENTIONS:  - Assess patient?s ability to void and empty bladder  - Monitor I/O  - Bladder scan as needed  - Discuss with physician/AP medications to alleviate retention as needed  - Discuss catheterization for long term situations as appropriate   Outcome: Progressing     Problem: DISCHARGE PLANNING - CARE MANAGEMENT  Goal: Discharge to post-acute care or home with appropriate resources  Description  INTERVENTIONS:  - Conduct assessment to determine patient/family and health care team treatment goals, and need for post-acute services based on payer coverage, community resources, and patient preferences, and barriers to discharge  - Address psychosocial, clinical, and financial barriers to discharge as identified in assessment in conjunction with the patient/family and health care team  - Arrange appropriate level of post-acute services according to patient's   needs and preference and payer coverage in collaboration with the physician and health care team  - Communicate with and update the patient/family, physician, and health care team regarding progress on the discharge plan  - Arrange appropriate transportation to post-acute venues  - Pt to d/c with appropriate resources when medically stable     Outcome: Progressing

## 2019-02-23 NOTE — RESPIRATORY THERAPY NOTE
RT Ventilator Management Note  Brennon Baptiste 60 y o  male MRN: [id number]  Unit/Bed#: [facility] 704-59 Encounter: [id number]      Daily Screen       2/6/2019 0734 2/22/2019 0922          Patient safety screen outcome[de-identified]  Passed  Failed      Not Ready for Weaning due to[de-identified]    Underline problem not resolved              Physical Exam:   Assessment Type: (P) Assess only  General Appearance: (P) Alert, Awake  Respiratory Pattern: (P) Assisted, Normal  Chest Assessment: (P) Chest expansion symmetrical  Bilateral Breath Sounds: (P) Diminished      Resp Comments: (P) Pt remained on a/c settings throughout the night without incident

## 2019-02-23 NOTE — PROGRESS NOTES
NEPHROLOGY PROGRESS NOTE   Wilberto Suggs 60 y o  male MRN: [id number]  Unit/Bed#: [facility] 562-82 Encounter: [id number]      ASSESSMENT & PLAN:    1  Acute kidney injury secondary to ATN will be in acute as an outpatient Tuesday Thursday Saturday  -seen on CVVHD at 7:45 a m   -run even for now, okay to be positive as well in the setting of GI bleed if still active  -if cartridge clots or the next 24 hours will discontinue CRRT depending on hemodynamics S and transition to hemodialysis  -would favor volume expansion in the setting of GI bleed monitor respiratory status    2  Access: Tunneled dialysis catheter replaced by IR  -tunneled dialysis catheter remains in place and in use currently on CVVH    3  MSSA bacteremia on 6 weeks of antibiotics  -continues on vancomycin for bacteremia    4  Bioprosthetic valve  -continue to monitor cardiac function    5  Atrial fibrillation  -currently heart rates are acceptable  -currently no anticoagulation given GI bleed    6  Anemia of chronic kidney disease  -patient had a GI bleed requiring urgent endoscopy now hemoglobin remained stable still remains on pressors monitor H&H  -hemoglobin did drop again today may require further blood transfusion    7  Ischemic cardiomyopathy with an EF 30%  -continue ultrafiltration as tolerated and keep even eyes and nose given cardiomyopathy    8   CKD bone mineral disease  -continue PhosLo for hyperphosphatemia    9  atrial fibrillation  -on Coumadin for anticoagulation now on hold because of GI bleed      SUBJECTIVE:    Events noted overnight patient seen ET tube in place abdomen soft anasarca    OBJECTIVE:  Current Weight: Weight - Scale: (!) 166 kg (365 lb 1 3 oz)  Vitals:    02/23/19 0700   BP:    Pulse: 84   Resp: (!) 4   Temp:    SpO2: 99%     Vitals:    02/23/19 0424 02/23/19 0500 02/23/19 0600 02/23/19 0700   BP:       BP Location:       Pulse:  84 84 84   Resp:  (!) 8 (!) 6 (!) 4   Temp:       TempSrc:       SpO2: 100% 99% 100% 99% Weight:   (!) 166 kg (365 lb 1 3 oz)    Height:               Intake/Output Summary (Last 24 hours) at 2/23/2019 0816  Last data filed at 2/23/2019 0700  Gross per 24 hour   Intake 4203 24 ml   Output 3032 ml   Net 1171 24 ml       General:  Critically ill  Eyes:  P[place]  ENT:  ET tube in place  Neck: supple, no JVD  Chest:  Coarse breath sounds  CVS: distinct S1 & S2, normal rate, regular rhythm  Abdomen: soft, non-tender, non-distended, normoactive bowel sounds  Extremities:  Trace edema  Skin: no rash  Neuro:  Sedated      Medications:    Current Facility-Administered Medications:     acetaminophen (TYLENOL) tablet 650 mg, 650 mg, Oral, Q6H PRN, Sally Donis PA-C, 650 mg at 02/20/19 2007    atorvastatin (LIPITOR) tablet 40 mg, 40 mg, Oral, Daily With Techoz Solders, HERBERT, 40 mg at 02/21/19 1621    bisacodyl (DULCOLAX) rectal suppository 10 mg, 10 mg, Rectal, Daily PRN, Sally Donis PA-C    calcium acetate (PHOSLO) capsule 1,334 mg, 1,334 mg, Oral, TID With Meals, Sally Donis PA-C, 1,334 mg at 02/21/19 1621    ceFAZolin (ANCEF) 2,000 mg in sodium chloride 0 9 % 50 mL IVPB, 2,000 mg, Intravenous, Q8H, Joey Newton PA-C, Stopped at 02/23/19 0300    fentanyl citrate (PF) 100 MCG/2ML 50 mcg, 50 mcg, Intravenous, Q2H PRN, Elliott Hannah PA-C    guaiFENesin (MUCINEX) 12 hr tablet 600 mg, 600 mg, Oral, Q12H [facility], Sally Donis PA-C, 600 mg at 02/21/19 1145    hydrocortisone 1 % cream, , Topical, BID, Sally Donis PA-C, 1 application at 04/18/01 1820    iron sucrose (VENOFER) 100 mg in sodium chloride 0 9 % 100 mL IVPB, 100 mg, Intravenous, Once per day on Tue Thu Sat, Sally Donis PA-C    metoclopramide (REGLAN) injection 10 mg, 10 mg, Intravenous, Q6H [facility], Elliott Hannah PA-C, 10 mg at 02/23/19 0506    norepinephrine (LEVOPHED) 4 mg (STANDARD CONCENTRATION) IV in sodium chloride 0 9% 250 mL, 1-30 mcg/min, Intravenous, Titrated, Elliott Hannah PA-C, Last Rate: 15 mL/hr at 02/23/19 0520, 4 mcg/min at 02/23/19 0520    NxStage K 4/Ca 3 dialysis solution (RFP-401) 20,000 mL, 20,000 mL, Dialysis, Continuous, Petar Leggett DO, Last Rate: 0 mL/hr at 02/23/19 0514, 20,000 mL at 02/23/19 0515    nystatin (MYCOSTATIN) powder, , Topical, BID, Sushila Henderson PA-C, 1 application at 18/59/31 1820    pantoprazole (PROTONIX) 80 mg in sodium chloride 0 9 % 100 mL infusion, 8 mg/hr, Intravenous, Continuous, Sushila Henderson PA-C, Last Rate: 10 mL/hr at 02/23/19 0506, 8 mg/hr at 02/23/19 0506    polyvinyl alcohol (LIQUIFILM TEARS) 1 4 % ophthalmic solution 1 drop, 1 drop, Both Eyes, Q3H PRN, Sushila Henderson PA-C, 1 drop at 02/13/19 1724    propofol (DIPRIVAN) 1000 mg in 100 mL infusion (premix), 5-50 mcg/kg/min, Intravenous, Titrated, Agueda Hannah PA-C, Last Rate: 14 9 mL/hr at 02/23/19 0522, 15 mcg/kg/min at 02/23/19 0522    sucralfate (CARAFATE) oral suspension 1,000 mg, 1,000 mg, Oral, BID AC, Agueda Hannah PA-C, 1,000 mg at 02/23/19 0609    vasopressin (PITRESSIN) 20 Units in sodium chloride 0 9 % 100 mL infusion, 0 04 Units/min, Intravenous, Continuous, Agueda Hannah PA-C, Stopped at 02/22/19 2232    Invasive Devices:      Lab Results:   Results from last 7 days   Lab Units 02/23/19  0550 02/22/19  2344 02/22/19  2342 02/22/19  1823 02/22/19  1218 02/22/19  1213 02/22/19  0637 02/22/19  0137 02/21/19  2243  02/21/19  2120  02/21/19  2040  02/20/19  0831   WBC Thousand/uL 18 90*  --   --   --   --   --  22 04* 22 28*  --   --  22 95*  --   --   --  11 91*   HEMOGLOBIN g/dL 7 1*  --  7 0* 8 0* 7 8*  --  8 1* 7 0*  --    < > 6 4*  --   --    < > 7 4*   I STAT HEMOGLOBIN g/dl  --   --   --   --   --   --   --   --  7 1*  --   --   --  5 8*   < >  --    HEMATOCRIT % 21 2*  --  20 5* 23 3* 23 3*  --  24 9* 21 7*  --    < > 20 3*  --   --    < > 24 1*   HEMATOCRIT, ISTAT %  --   --   --   --   --   --   --   --  21*  --   --   --  17*   < >  --    PLATELETS Thousands/uL 82*  --   --   --   -- --  121* 150  --   --  188  --   --   --  226   POTASSIUM mmol/L 3 9  3 9 4 1  --  3 9  --  3 5 4 0 3 9  --   --  4 1   < >  --   --   --    CHLORIDE mmol/L 106  107 106  --  104  --  101 100 100  --   --  100   < >  --   --   --    CO2 mmol/L 22  23 21  --  20*  --  19* 20* 23  --   --  25   < >  --   --   --    CO2, I-STAT mmol/L  --   --   --   --   --   --   --   --  26  --   --   --  25   < >  --    BUN mg/dL 46*  48* 60*  --  74*  --  88* 82* 77*  --   --  74*   < >  --   --   --    CREATININE mg/dL 2 94*  2 88* 3 43*  --  4 25*  --  5 26* 4 96* 4 93*  --   --  5 16*   < >  --   --   --    CALCIUM mg/dL 7 7*  8 0* 7 8*  --  7 6*  --  7 4* 6 7* 7 0*  --   --  7 3*   < >  --   --   --    MAGNESIUM mg/dL 2 1  2 1 2 2  --  2 5  --  2 8* 2 5 2 4  --   --  2 6   < >  --   --   --    PHOSPHORUS mg/dL 2 2*  2 0* 2 0*  --  2 6*  --  3 7 3 9 3 7  --   --  3 9   < >  --   --   --    ALK PHOS U/L  --   --   --   --   --   --   --  54  --   --  57  --   --   --   --    ALT U/L  --   --   --   --   --   --   --  8*  --   --  <6*  --   --   --   --    AST U/L  --   --   --   --   --   --   --  12  --   --  12  --   --   --   --    GLUCOSE, ISTAT mg/dl  --   --   --   --   --   --   --   --  117  --   --   --  115  --   --     < > = values in this interval not displayed         Previous work up:  Please see previous notes

## 2019-02-23 NOTE — PROGRESS NOTES
~14:30 Patient's H/H resulted at 6 5/20, down from 7 1/21 2 just 6 hours prior, despite receiving 1 unit PRBCs  High volume maroon colored stool over the course of the day  No bloody output from OGT, no increase in pressor requirement  Although expected some degree of ongoing maroon stool after UGIB and reglan over past day, timing of H/H drop >24H since initial EGD with apparent hemostasis is concerning for recurrent bleeding  Is overall positive over past two days but has been running even on CRRT over the course of the day, which does not explain why H/H declined since this AM despite 2 units PRBCs since 6am   CVVH did not go down at any point over past 24H to explain blood loss  Abdomen soft, no grimace to deep palpation  GI contacted regarding repeat endoscopic evaluation  Will transfuse additional 2 units PRBCs, 2 units FFP, and 1 unit platelets as well as replace calcium  ~16:30 EGD at bedside negative for signs of recurrent or active bleeding  Discussed colonoscopy with GI given ongoing liquid maroon stool, but concerned they would not be able to achieve adequate visualization at present  CTA from 2/22 done at time of initial decompensation from GIB showed area within rectum of potential additional active hemorrhage as well as areas of pneumatosis  Will repeat CBC, INR, fibrinogen, and lactate after transfusions as above have completed  Patient briefly back on norepi to counteract additional sedation with EGD but otherwise without worsening hemodynamics  Per GI will start go lytely prep  General surgery made aware of patient at time of initial decline 2/21        Critical care time 25 minutes

## 2019-02-24 LAB
ABO GROUP BLD BPU: NORMAL
ANION GAP SERPL CALCULATED.3IONS-SCNC: 10 MMOL/L (ref 4–13)
ANION GAP SERPL CALCULATED.3IONS-SCNC: 8 MMOL/L (ref 4–13)
BASOPHILS # BLD AUTO: 0.08 THOUSANDS/ΜL (ref 0–0.1)
BASOPHILS NFR BLD AUTO: 1 % (ref 0–1)
BPU ID: NORMAL
BUN SERPL-MCNC: 20 MG/DL (ref 5–25)
BUN SERPL-MCNC: 25 MG/DL (ref 5–25)
BUN SERPL-MCNC: 28 MG/DL (ref 5–25)
BUN SERPL-MCNC: 32 MG/DL (ref 5–25)
CA-I BLD-SCNC: 1.13 MMOL/L (ref 1.12–1.32)
CA-I BLD-SCNC: 1.13 MMOL/L (ref 1.12–1.32)
CA-I BLD-SCNC: 1.14 MMOL/L (ref 1.12–1.32)
CA-I BLD-SCNC: 1.14 MMOL/L (ref 1.12–1.32)
CALCIUM SERPL-MCNC: 7.9 MG/DL (ref 8.3–10.1)
CALCIUM SERPL-MCNC: 8 MG/DL (ref 8.3–10.1)
CALCIUM SERPL-MCNC: 8 MG/DL (ref 8.3–10.1)
CALCIUM SERPL-MCNC: 8.1 MG/DL (ref 8.3–10.1)
CHLORIDE SERPL-SCNC: 107 MMOL/L (ref 100–108)
CHLORIDE SERPL-SCNC: 108 MMOL/L (ref 100–108)
CO2 SERPL-SCNC: 23 MMOL/L (ref 21–32)
CO2 SERPL-SCNC: 24 MMOL/L (ref 21–32)
CO2 SERPL-SCNC: 25 MMOL/L (ref 21–32)
CO2 SERPL-SCNC: 25 MMOL/L (ref 21–32)
CREAT SERPL-MCNC: 1.66 MG/DL (ref 0.6–1.3)
CREAT SERPL-MCNC: 1.94 MG/DL (ref 0.6–1.3)
CREAT SERPL-MCNC: 2.1 MG/DL (ref 0.6–1.3)
CREAT SERPL-MCNC: 2.12 MG/DL (ref 0.6–1.3)
EOSINOPHIL # BLD AUTO: 1.79 THOUSAND/ΜL (ref 0–0.61)
EOSINOPHIL NFR BLD AUTO: 12 % (ref 0–6)
ERYTHROCYTE [DISTWIDTH] IN BLOOD BY AUTOMATED COUNT: 16.3 % (ref 11.6–15.1)
FIBRINOGEN PPP-MCNC: 294 MG/DL (ref 227–495)
GFR SERPL CREATININE-BSD FRML MDRD: 33 ML/MIN/1.73SQ M
GFR SERPL CREATININE-BSD FRML MDRD: 33 ML/MIN/1.73SQ M
GFR SERPL CREATININE-BSD FRML MDRD: 37 ML/MIN/1.73SQ M
GFR SERPL CREATININE-BSD FRML MDRD: 44 ML/MIN/1.73SQ M
GLUCOSE SERPL-MCNC: 76 MG/DL (ref 65–140)
GLUCOSE SERPL-MCNC: 78 MG/DL (ref 65–140)
GLUCOSE SERPL-MCNC: 84 MG/DL (ref 65–140)
GLUCOSE SERPL-MCNC: 86 MG/DL (ref 65–140)
HCT VFR BLD AUTO: 25.5 % (ref 36.5–49.3)
HGB BLD-MCNC: 7.5 G/DL (ref 12–17)
HGB BLD-MCNC: 7.7 G/DL (ref 12–17)
HGB BLD-MCNC: 8.1 G/DL (ref 12–17)
HGB BLD-MCNC: 8.3 G/DL (ref 12–17)
IMM GRANULOCYTES # BLD AUTO: >0.5 THOUSAND/UL (ref 0–0.2)
IMM GRANULOCYTES NFR BLD AUTO: 10 % (ref 0–2)
INR PPP: 1.26 (ref 0.86–1.17)
LYMPHOCYTES # BLD AUTO: 0.83 THOUSANDS/ΜL (ref 0.6–4.47)
LYMPHOCYTES NFR BLD AUTO: 5 % (ref 14–44)
MAGNESIUM SERPL-MCNC: 1.9 MG/DL (ref 1.6–2.6)
MAGNESIUM SERPL-MCNC: 2 MG/DL (ref 1.6–2.6)
MAGNESIUM SERPL-MCNC: 2.1 MG/DL (ref 1.6–2.6)
MAGNESIUM SERPL-MCNC: 2.1 MG/DL (ref 1.6–2.6)
MCH RBC QN AUTO: 29.5 PG (ref 26.8–34.3)
MCHC RBC AUTO-ENTMCNC: 32.5 G/DL (ref 31.4–37.4)
MCV RBC AUTO: 91 FL (ref 82–98)
MONOCYTES # BLD AUTO: 1.2 THOUSAND/ΜL (ref 0.17–1.22)
MONOCYTES NFR BLD AUTO: 8 % (ref 4–12)
NEUTROPHILS # BLD AUTO: 10.08 THOUSANDS/ΜL (ref 1.85–7.62)
NEUTS SEG NFR BLD AUTO: 64 % (ref 43–75)
NRBC BLD AUTO-RTO: 0 /100 WBCS
PHOSPHATE SERPL-MCNC: 2.2 MG/DL (ref 2.7–4.5)
PHOSPHATE SERPL-MCNC: 2.2 MG/DL (ref 2.7–4.5)
PHOSPHATE SERPL-MCNC: 2.4 MG/DL (ref 2.7–4.5)
PHOSPHATE SERPL-MCNC: 2.5 MG/DL (ref 2.7–4.5)
PLATELET # BLD AUTO: 86 THOUSANDS/UL (ref 149–390)
PMV BLD AUTO: 10.9 FL (ref 8.9–12.7)
POTASSIUM SERPL-SCNC: 3.6 MMOL/L (ref 3.5–5.3)
POTASSIUM SERPL-SCNC: 3.8 MMOL/L (ref 3.5–5.3)
POTASSIUM SERPL-SCNC: 3.9 MMOL/L (ref 3.5–5.3)
POTASSIUM SERPL-SCNC: 4.1 MMOL/L (ref 3.5–5.3)
PROTHROMBIN TIME: 15.9 SECONDS (ref 11.8–14.2)
RBC # BLD AUTO: 2.81 MILLION/UL (ref 3.88–5.62)
SODIUM SERPL-SCNC: 140 MMOL/L (ref 136–145)
SODIUM SERPL-SCNC: 140 MMOL/L (ref 136–145)
SODIUM SERPL-SCNC: 141 MMOL/L (ref 136–145)
SODIUM SERPL-SCNC: 141 MMOL/L (ref 136–145)
UNIT DISPENSE STATUS: NORMAL
UNIT PRODUCT CODE: NORMAL
UNIT RH: NORMAL
WBC # BLD AUTO: 15.57 THOUSAND/UL (ref 4.31–10.16)

## 2019-02-24 PROCEDURE — 80048 BASIC METABOLIC PNL TOTAL CA: CPT | Performed by: INTERNAL MEDICINE

## 2019-02-24 PROCEDURE — 90945 DIALYSIS ONE EVALUATION: CPT

## 2019-02-24 PROCEDURE — 83735 ASSAY OF MAGNESIUM: CPT | Performed by: INTERNAL MEDICINE

## 2019-02-24 PROCEDURE — 84100 ASSAY OF PHOSPHORUS: CPT | Performed by: INTERNAL MEDICINE

## 2019-02-24 PROCEDURE — 94760 N-INVAS EAR/PLS OXIMETRY 1: CPT

## 2019-02-24 PROCEDURE — 99232 SBSQ HOSP IP/OBS MODERATE 35: CPT | Performed by: INTERNAL MEDICINE

## 2019-02-24 PROCEDURE — 85018 HEMOGLOBIN: CPT | Performed by: PHYSICIAN ASSISTANT

## 2019-02-24 PROCEDURE — 85384 FIBRINOGEN ACTIVITY: CPT | Performed by: PHYSICIAN ASSISTANT

## 2019-02-24 PROCEDURE — P9016 RBC LEUKOCYTES REDUCED: HCPCS

## 2019-02-24 PROCEDURE — 90945 DIALYSIS ONE EVALUATION: CPT | Performed by: INTERNAL MEDICINE

## 2019-02-24 PROCEDURE — 85610 PROTHROMBIN TIME: CPT | Performed by: PHYSICIAN ASSISTANT

## 2019-02-24 PROCEDURE — 85025 COMPLETE CBC W/AUTO DIFF WBC: CPT | Performed by: PHYSICIAN ASSISTANT

## 2019-02-24 PROCEDURE — P9100 PATHOGEN TEST FOR PLATELETS: HCPCS

## 2019-02-24 PROCEDURE — 94760 N-INVAS EAR/PLS OXIMETRY 1: CPT | Performed by: SOCIAL WORKER

## 2019-02-24 PROCEDURE — C9113 INJ PANTOPRAZOLE SODIUM, VIA: HCPCS | Performed by: PHYSICIAN ASSISTANT

## 2019-02-24 PROCEDURE — 82330 ASSAY OF CALCIUM: CPT | Performed by: INTERNAL MEDICINE

## 2019-02-24 PROCEDURE — 99233 SBSQ HOSP IP/OBS HIGH 50: CPT | Performed by: STUDENT IN AN ORGANIZED HEALTH CARE EDUCATION/TRAINING PROGRAM

## 2019-02-24 PROCEDURE — 93005 ELECTROCARDIOGRAM TRACING: CPT

## 2019-02-24 PROCEDURE — P9035 PLATELET PHERES LEUKOREDUCED: HCPCS

## 2019-02-24 RX ORDER — POTASSIUM CHLORIDE 29.8 MG/ML
40 INJECTION INTRAVENOUS ONCE
Status: COMPLETED | OUTPATIENT
Start: 2019-02-24 | End: 2019-02-24

## 2019-02-24 RX ORDER — CHLORHEXIDINE GLUCONATE 0.12 MG/ML
15 RINSE ORAL EVERY 12 HOURS SCHEDULED
Status: DISCONTINUED | OUTPATIENT
Start: 2019-02-24 | End: 2019-02-26

## 2019-02-24 RX ORDER — MAGNESIUM SULFATE 1 G/100ML
1 INJECTION INTRAVENOUS ONCE
Status: COMPLETED | OUTPATIENT
Start: 2019-02-24 | End: 2019-02-24

## 2019-02-24 RX ADMIN — POTASSIUM CHLORIDE 40 MEQ: 400 INJECTION, SOLUTION INTRAVENOUS at 00:57

## 2019-02-24 RX ADMIN — CEFAZOLIN SODIUM 2000 MG: 10 INJECTION, POWDER, FOR SOLUTION INTRAVENOUS at 18:55

## 2019-02-24 RX ADMIN — SODIUM PHOSPHATE, MONOBASIC, MONOHYDRATE 6 MMOL: 276; 142 INJECTION, SOLUTION INTRAVENOUS at 01:36

## 2019-02-24 RX ADMIN — METOCLOPRAMIDE 10 MG: 5 INJECTION, SOLUTION INTRAMUSCULAR; INTRAVENOUS at 05:13

## 2019-02-24 RX ADMIN — CALCIUM GLUCONATE 1 G: 98 INJECTION, SOLUTION INTRAVENOUS at 15:07

## 2019-02-24 RX ADMIN — SODIUM PHOSPHATE, MONOBASIC, MONOHYDRATE 6 MMOL: 276; 142 INJECTION, SOLUTION INTRAVENOUS at 15:29

## 2019-02-24 RX ADMIN — POTASSIUM CHLORIDE 40 MEQ: 400 INJECTION, SOLUTION INTRAVENOUS at 19:37

## 2019-02-24 RX ADMIN — CHLORHEXIDINE GLUCONATE 0.12% ORAL RINSE 15 ML: 1.2 LIQUID ORAL at 08:28

## 2019-02-24 RX ADMIN — HYDROCORTISONE: 1 CREAM TOPICAL at 08:22

## 2019-02-24 RX ADMIN — Medication 20000 ML: at 12:45

## 2019-02-24 RX ADMIN — PROPOFOL 20 MCG/KG/MIN: 10 INJECTION, EMULSION INTRAVENOUS at 05:13

## 2019-02-24 RX ADMIN — METOCLOPRAMIDE 10 MG: 5 INJECTION, SOLUTION INTRAMUSCULAR; INTRAVENOUS at 00:40

## 2019-02-24 RX ADMIN — CALCIUM GLUCONATE 1 G: 94 INJECTION, SOLUTION INTRAVENOUS at 19:37

## 2019-02-24 RX ADMIN — METOCLOPRAMIDE 10 MG: 5 INJECTION, SOLUTION INTRAMUSCULAR; INTRAVENOUS at 17:34

## 2019-02-24 RX ADMIN — PROPOFOL 30 MCG/KG/MIN: 10 INJECTION, EMULSION INTRAVENOUS at 22:15

## 2019-02-24 RX ADMIN — SODIUM CHLORIDE 8 MG/HR: 9 INJECTION, SOLUTION INTRAVENOUS at 20:21

## 2019-02-24 RX ADMIN — POTASSIUM CHLORIDE 40 MEQ: 400 INJECTION, SOLUTION INTRAVENOUS at 15:07

## 2019-02-24 RX ADMIN — HYDROCORTISONE: 1 CREAM TOPICAL at 17:34

## 2019-02-24 RX ADMIN — CEFAZOLIN SODIUM 2000 MG: 10 INJECTION, POWDER, FOR SOLUTION INTRAVENOUS at 01:55

## 2019-02-24 RX ADMIN — SUCRALFATE 1000 MG: 1 SUSPENSION ORAL at 16:44

## 2019-02-24 RX ADMIN — NYSTATIN: 100000 POWDER TOPICAL at 17:34

## 2019-02-24 RX ADMIN — Medication 20000 ML: at 03:38

## 2019-02-24 RX ADMIN — NYSTATIN: 100000 POWDER TOPICAL at 08:22

## 2019-02-24 RX ADMIN — ATORVASTATIN CALCIUM 40 MG: 40 TABLET, FILM COATED ORAL at 16:45

## 2019-02-24 RX ADMIN — CHLORHEXIDINE GLUCONATE 0.12% ORAL RINSE 15 ML: 1.2 LIQUID ORAL at 20:06

## 2019-02-24 RX ADMIN — Medication 20000 ML: at 08:10

## 2019-02-24 RX ADMIN — CEFAZOLIN SODIUM 2000 MG: 10 INJECTION, POWDER, FOR SOLUTION INTRAVENOUS at 10:14

## 2019-02-24 RX ADMIN — PROPOFOL 20 MCG/KG/MIN: 10 INJECTION, EMULSION INTRAVENOUS at 09:05

## 2019-02-24 RX ADMIN — GUAIFENESIN 600 MG: 600 TABLET, EXTENDED RELEASE ORAL at 20:31

## 2019-02-24 RX ADMIN — Medication 20000 ML: at 21:54

## 2019-02-24 RX ADMIN — Medication 20000 ML: at 17:30

## 2019-02-24 RX ADMIN — SODIUM PHOSPHATE, MONOBASIC, MONOHYDRATE 6 MMOL: 276; 142 INJECTION, SOLUTION INTRAVENOUS at 20:44

## 2019-02-24 RX ADMIN — PROPOFOL 20 MCG/KG/MIN: 10 INJECTION, EMULSION INTRAVENOUS at 00:44

## 2019-02-24 RX ADMIN — METOCLOPRAMIDE 10 MG: 5 INJECTION, SOLUTION INTRAMUSCULAR; INTRAVENOUS at 23:05

## 2019-02-24 RX ADMIN — METOCLOPRAMIDE 10 MG: 5 INJECTION, SOLUTION INTRAMUSCULAR; INTRAVENOUS at 12:25

## 2019-02-24 RX ADMIN — CALCIUM GLUCONATE 1 G: 98 INJECTION, SOLUTION INTRAVENOUS at 06:40

## 2019-02-24 RX ADMIN — MAGNESIUM SULFATE HEPTAHYDRATE 1 G: 1 INJECTION, SOLUTION INTRAVENOUS at 15:18

## 2019-02-24 RX ADMIN — CALCIUM GLUCONATE 1 G: 94 INJECTION, SOLUTION INTRAVENOUS at 00:04

## 2019-02-24 RX ADMIN — PROPOFOL 30 MCG/KG/MIN: 10 INJECTION, EMULSION INTRAVENOUS at 19:14

## 2019-02-24 RX ADMIN — GUAIFENESIN 600 MG: 600 TABLET, EXTENDED RELEASE ORAL at 08:28

## 2019-02-24 RX ADMIN — SODIUM PHOSPHATE, MONOBASIC, MONOHYDRATE 6 MMOL: 276; 142 INJECTION, SOLUTION INTRAVENOUS at 08:00

## 2019-02-24 NOTE — PROGRESS NOTES
The patient passes liquid stool which is becoming more brown  Melena/maroon stool  is clearing  GoLYTELY prep given through the OG tube  Spoke with the Blood Bank last evening, received about 10 units of packed red blood cells over the last 3 days  Today hemoglobin is 8 3  The patient is off pressors  Lactic acid 0 7, normal electrolytes, no acidosis  WBC 42973, improving  Blood pressure 131/67, heart rate 84  Abdomen is soft, distended  CTA reviewed:  Small foci of increased density in the antrum and in the rectum  Colonic distention with areas of pneumatosis within the splenic flexure and sigmoid colon  Impressions/recommendations:    1  GI bleeding, most likely from the antral/pyloric ulcer  Status post endoscopic therapy  Bleeding hopefully resolving  Repeat endoscopy yesterday after known showed clear based ulcer without bleeding and without stigmata of bleeding  Possibly another source of blood loss, rule out ischemic colitis  Would hold colonoscopy at present  Keep NPO for another 24 hours

## 2019-02-24 NOTE — RESPIRATORY THERAPY NOTE
RT Ventilator Management Note  Riley Espinoza 60 y o  male MRN: [id number]  Unit/Bed#: [facility] 955-33 Encounter: [id number]      Daily Screen       2/23/2019 0821 2/23/2019 0828          Patient safety screen outcome[de-identified]  Failed  Passed      Not Ready for Weaning due to[de-identified]  Underline problem not resolved        Spont breathing trial % for 30 min:    Yes      Spont breathing trial outcome[de-identified]    Passed      RSBI:    51              Physical Exam:   Assessment Type: (P) Assess only  General Appearance: (P) Sedated  Respiratory Pattern: (P) Assisted, Normal  Chest Assessment: (P) Chest expansion symmetrical  Bilateral Breath Sounds: (P) Coarse      Resp Comments: (P) Pt remained on a/c settings throughout the night without incident

## 2019-02-24 NOTE — PROGRESS NOTES
Cardiology Progress Note - Delfino Raymond 60 y o  male MRN: [id number]    Unit/Bed#: [facility] 267-47 Encounter: [id number]  Assessment and plan  1  Mixed shock primarily hypovolemic at this time  2  Normalized LV function 55%  3  Bioprosthetic AVR  4  Type 2 myocardial infarction without ST elevation  5  Acute kidney injury now on dialysis  6  Atrial flutter  7  GI ulcer with significant bleeding    Recommendations:  Blood pressures been more stable  He is off pressors at this time  Would consider removing some volume with CVVH given significant overload  Amiodarone was discontinued secondary to rash suspect due to iodine component  He is maintaining sinus rhythm with baseline left bundle-branch block  LV function has been preserved on recent echo  If he remains off pressors through tomorrow could restart low-dose beta-blocker       Subjective:     Sedated on vent  No significant events overnight  lit Objective:   Vitals: Blood pressure 150/70, pulse 96, temperature 98 6 °F (37 °C), resp  rate 20, height 5' 9" (1 753 m), weight (!) 165 kg (364 lb 13 8 oz), SpO2 99 %  , Body mass index is 53 88 kg/m² ,   Orthostatic Blood Pressures      Most Recent Value   Blood Pressure  150/70 filed at 02/23/2019 2019   Patient Position - Orthostatic VS  Lying filed at 02/22/2019 2774         Systolic (26XCE), AAM:002 , Min:103 , BRX:758     Diastolic (17ACM), JJN:71, Min:48, Max:70      Intake/Output Summary (Last 24 hours) at 2/24/2019 1112  Last data filed at 2/24/2019 1000  Gross per 24 hour   Intake 9267 5 ml   Output 7892 ml   Net 1375 5 ml     Weight (last 2 days)     Date/Time   Weight    02/24/19 0600   165 (364 86)  (Abnormal)     02/23/19 0600   166 (365 08)  (Abnormal)     02/22/19 0515   165 (363 76)  (Abnormal)                 Telemetry Review: No significant arrhythmias seen on telemetry review  EKG personally reviewed by Staci Lee DO       Physical Exam   Constitutional: He appears well-nourished  No distress  He is intubated  HENT:   Head: Atraumatic  Eyes: Pupils are equal, round, and reactive to light  Conjunctivae are normal    Neck: Neck supple  Cardiovascular: Normal rate and regular rhythm  Exam reveals no friction rub  Murmur heard  Pulmonary/Chest: Effort normal  He is intubated  No respiratory distress  He has decreased breath sounds  He has no wheezes  He has rhonchi  He has no rales  Abdominal: Bowel sounds are normal  He exhibits no distension  There is no tenderness  There is no rebound  Musculoskeletal: He exhibits edema  He exhibits no tenderness or deformity  Neurological: He is unresponsive  No cranial nerve deficit  Skin: Skin is warm and dry  No erythema  Nursing note and vitals reviewed          Laboratory Results:        CBC with diff:   Results from last 7 days   Lab Units 02/24/19  0559 02/23/19  2343 02/23/19  2059 02/23/19  1838 02/23/19  1223 02/23/19  0550 02/22/19  2342 02/22/19  1823 02/22/19  1218 02/22/19  9692 02/22/19  0137  02/21/19  2120  02/20/19  0831  02/18/19  0909   WBC Thousand/uL 15 57*  --   --  15 22*  --  18 90*  --   --   --  22 04* 22 28*  --  22 95*  --  11 91*  --  10 54*   HEMOGLOBIN g/dL 8 3* 7 7* 8 0* 6 8* 6 5* 7 1* 7 0* 8 0* 7 8* 8 1* 7 0*   < > 6 4*   < > 7 4*   < > 7 0*   I STAT HEMOGLOBIN   --   --   --   --   --   --   --   --   --   --   --    < >  --    < >  --   --   --    HEMATOCRIT % 25 5*  --   --  20 5* 20 1* 21 2* 20 5* 23 3* 23 3* 24 9* 21 7*   < > 20 3*   < > 24 1*   < > 23 1*   HEMATOCRIT, ISTAT   --   --   --   --   --   --   --   --   --   --   --    < >  --    < >  --   --   --    MCV fL 91  --   --  90  --  88  --   --   --  90 88  --  90  --  87  --  88   PLATELETS Thousands/uL 86*  --   --  97*  --  82*  --   --   --  121* 150  --  188  --  226  --  238   MCH pg 29 5  --   --  29 7  --  29 5  --   --   --  29 2 28 5  --  28 3  --  26 7*  --  26 3*   MCHC g/dL 32 5  --   --  33 2  --  33 5  --   --   -- 32 5 32 3  --  31 5  --  30 7*  --  29 9*   RDW % 16 3*  --   --  15 9*  --  15 6*  --   --   --  15 8* 15 2*  --  16 5*  --  16 9*  --  17 3*   MPV fL 10 9  --   --  10 2  --  10 7  --   --   --  10 9 10 1  --  10 4  --  10 0  --  10 8   NRBC AUTO /100 WBCs 0  --   --   --   --   --   --   --   --  1  --   --   --   --   --   --  0   NRBC /100 WBC  --   --   --   --   --   --   --   --   --  1  --   --   --   --   --   --   --     < > = values in this interval not displayed  CMP:  Results from last 7 days   Lab Units 02/24/19  0559 02/23/19  2343 02/23/19  1839 02/23/19  1223 02/23/19  0550 02/22/19  2344 02/22/19  1823  02/22/19  0137 02/21/19  2243 02/21/19  2120  02/21/19  2040   POTASSIUM mmol/L 4 1 3 8 3 8 4 1 3 9  3 9 4 1 3 9   < > 3 9  --  4 1   < >  --    CHLORIDE mmol/L 108 107 107 108 106  107 106 104   < > 100  --  100   < >  --    CO2 mmol/L 24 23 24 23 22  23 21 20*   < > 23  --  25   < >  --    CO2, I-STAT mmol/L  --   --   --   --   --   --   --   --   --  26  --   --  25   BUN mg/dL 28* 32* 35* 40* 46*  48* 60* 74*   < > 77*  --  74*   < >  --    CREATININE mg/dL 2 10* 2 12* 2 29* 2 57* 2 94*  2 88* 3 43* 4 25*   < > 4 93*  --  5 16*   < >  --    GLUCOSE, ISTAT mg/dl  --   --   --   --   --   --   --   --   --  117  --   --  115   CALCIUM mg/dL 8 1* 8 0* 7 9* 7 7* 7 7*  8 0* 7 8* 7 6*   < > 7 0*  --  7 3*   < >  --    AST U/L  --   --   --   --   --   --   --   --  12  --  12  --   --    ALT U/L  --   --   --   --   --   --   --   --  8*  --  <6*  --   --    ALK PHOS U/L  --   --   --   --   --   --   --   --  54  --  57  --   --    EGFR ml/min/1 73sq m 33 33 30 26 22  23 18 14   < > 12  --  11   < >  --     < > = values in this interval not displayed           BMP:  Results from last 7 days   Lab Units 02/24/19  0559 02/23/19  2343 02/23/19  1839 02/23/19  1223 02/23/19  0550 02/22/19  2344 02/22/19  1823  02/21/19  2243   POTASSIUM mmol/L 4 1 3 8 3 8 4 1 3 9  3 9 4 1 3 9   < >  -- CHLORIDE mmol/L 108 107 107 108 106  107 106 104   < >  --    CO2 mmol/L 24 23 24 23 22  23 21 20*   < >  --    CO2, I-STAT mmol/L  --   --   --   --   --   --   --   --  26   BUN mg/dL 28* 32* 35* 40* 46*  48* 60* 74*   < >  --    CREATININE mg/dL 2 10* 2 12* 2 29* 2 57* 2 94*  2 88* 3 43* 4 25*   < >  --    GLUCOSE, ISTAT mg/dl  --   --   --   --   --   --   --   --  117   CALCIUM mg/dL 8 1* 8 0* 7 9* 7 7* 7 7*  8 0* 7 8* 7 6*   < >  --     < > = values in this interval not displayed  BNP: No results for input(s): BNP in the last 72 hours      Magnesium:   Results from last 7 days   Lab Units 19  0559 19  2343 19  1839 19  1223 19  0550 19  2344 19  1823   MAGNESIUM mg/dL 2 1 2 1 1 9 2 1 2 1  2 1 2 2 2 5       Coags:   Results from last 7 days   Lab Units 19  0559 19  1839 19  0826 19  0637 19  0137 19  2120 19  0511   INR  1 26* 1 26* 1 27* 1 36* 1 48* 2 65* 1 89*       TSH:        Hemoglobin A1C         Lipid Profile:         Cardiac testing:   Results for orders placed during the hospital encounter of 19   Echo complete with contrast if indicated    Narrative [address] [facility] [address] [facility], [facility]  [phone]    Transthoracic Echocardiogram  2D, Doppler, and Color Doppler    Study date:  2019    Patient: Konstantin Cardoso  MR number: [id number]  Account number: [de-identified]  : 1960  Age: 60 years  Gender: Male  Status: Inpatient  Location: Bedside  Height: 72 in  Weight: 339 lb  BP: 89/ 54 mmHg    Indications: chest pain    Diagnoses: R07 9 - Chest pain, unspecified    Sonographer:  MADHU Alonso  Primary Physician:  Van Hardin DO  Referring Physician:  Bird Arredondo DO  Group:  [facility]  Interpreting Physician:  Freda Sorto DO    SUMMARY    PROCEDURE INFORMATION:  This was a technically difficult study despite the administration of echo contrast     LEFT VENTRICLE:  Systolic function was markedly reduced  Ejection fraction was estimated to be 30 %  There was severe diffuse hypokinesis with no obvious identifiable regional variations  Wall thickness was moderately increased  Concentric hypertrophy was present  VENTRICULAR SEPTUM:  There was dyssynergic motion  RIGHT VENTRICLE:  The ventricle was mildly dilated  Systolic function was moderately to markedly reduced  LEFT ATRIUM:  The atrium was mildly dilated  RIGHT ATRIUM:  The atrium was mildly dilated  AORTIC VALVE:  A bioprosthesis was present  It was not well visualized  Mean transvalvular gradient 13 mmHg  TRICUSPID VALVE:  There was mild regurgitation  PERICARDIUM:  A small, partially loculated pericardial effusion was identified along the left ventricular free wall  HISTORY: PRIOR HISTORY: Aortic valve prosthesis    PROCEDURE: The procedure was performed at the bedside  This was a routine study  The transthoracic approach was used  The study included complete 2D imaging, complete spectral Doppler, and color Doppler  The heart rate was 80 bpm, at the  start of the study  Intravenous contrast (Definity solution [1 3 ml Definity/8 7ml normal saline solution]) was administered  Intravenous contrast (Definity solution [1 3 ml Definity/8 7ml normal saline solution]) was administered to  opacify the left ventricle and enhance Doppler signals  Echocardiographic views were limited due to low windows and patient on mechanical ventilator  This was a technically difficult study despite the administration of echo contrast     LEFT VENTRICLE: Size was normal  Systolic function was markedly reduced  Ejection fraction was estimated to be 30 %  There was severe diffuse hypokinesis with no obvious identifiable regional variations  Wall thickness was moderately  increased  Concentric hypertrophy was present  DOPPLER: Normal sinus rhythm was absent   The study was not technically sufficient to allow evaluation of LV diastolic function  VENTRICULAR SEPTUM: There was dyssynergic motion  RIGHT VENTRICLE: The ventricle was mildly dilated  Systolic function was moderately to markedly reduced  LEFT ATRIUM: The atrium was mildly dilated  RIGHT ATRIUM: The atrium was mildly dilated  MITRAL VALVE: Not well visualized  DOPPLER: The transmitral velocity was within the normal range  There was no evidence for stenosis  There was no significant regurgitation  AORTIC VALVE: A bioprosthesis was present  It was not well visualized  Mean transvalvular gradient 13 mmHg  DOPPLER: There was no significant regurgitation  TRICUSPID VALVE: The valve structure was normal  There was normal leaflet separation  DOPPLER: The transtricuspid velocity was within the normal range  There was no evidence for stenosis  There was mild regurgitation  The tricuspid jet  envelope definition was inadequate for estimation of RV systolic pressure  PULMONIC VALVE: Leaflets exhibited normal thickness, no calcification, and normal cuspal separation  DOPPLER: The transpulmonic velocity was within the normal range  There was no significant regurgitation  PERICARDIUM: A small, partially loculated pericardial effusion was identified along the left ventricular free wall  The pericardium was normal in appearance  AORTA: The root exhibited normal size  SYSTEM MEASUREMENT TABLES    2D  %FS: 15 83 %  Ao Diam: 3 09 cm  EDV(Teich): 221 88 ml  EF(Teich): 32 54 %  ESV(Teich): 149 69 ml  IVSd: 1 59 cm  LA Diam: 5 18 cm  LVIDd: 6 58 cm  LVIDs: 5 54 cm  LVPWd: 1 43 cm  SV(Teich): 72 19 ml    CW  AV Env  Ti: 233 56 ms  AV VTI: 49 77 cm  AV Vmax: 2 64 m/s  AV Vmax: 2 67 m/s  AV Vmean: 2 13 m/s  AV maxP 95 mmHg  AV maxP 53 mmHg  AV meanP 73 mmHg    PW  LVOT Env  Ti: 215 22 ms  LVOT VTI: 11 41 cm  LVOT Vmax: 0 59 m/s  LVOT Vmax: 0 7 m/s  LVOT Vmean: 0 52 m/s  LVOT maxP 78 mmHg  LVOT maxP mmHg  LVOT meanP 24 mmHg  MV E Deep: 1 01 m/s    [facility]    Prepared and electronically signed by    Greg Painting DO  Signed 2019 10:14:55       Results for orders placed during the hospital encounter of 19   CHON    Narrative [address]  [facility], [address] [facility]  [phone]    Transesophageal Echocardiogram  2D, Doppler, and Color Doppler    Study date:  2019    Patient: Aleja Kendall  MR number: [id number]  Account number: [de-identified]  : 1960  Age: 60 years  Gender: Male  Status: Inpatient  Location: Bedside  Height: 69 in  Weight: 319 lb  BP: 101/ 54 mmHg    Indications: Bacteremia  Diagnoses: R78 81 - Bacteremia    Sonographer:  Harvey Andersen RDCS  Interpreting Physician:  Staci Lee DO  Primary Physician:  Frances Aguilar DO  Referring Physician:  Osorio De Jesus DO  Group:  [facility]  Cardiology Fellow:  Porsche Winter MD    IMPRESSIONS:  There was no echocardiographic evidence for valvular vegetation  SUMMARY    LEFT VENTRICLE:  Systolic function was moderately reduced  Ejection fraction was estimated to be 40 %  There was moderate diffuse hypokinesis  Wall thickness was mildly increased  There was mild concentric hypertrophy  RIGHT VENTRICLE:  The size was normal   Systolic function was mildly reduced  LEFT ATRIUM:  The atrium was mildly dilated  ATRIAL SEPTUM:  There was a small patent foramen ovale with left to right shunt identified by color Doppler  RIGHT ATRIUM:  The atrium was mildly dilated  MITRAL VALVE:  There was trace regurgitation  There was no echocardiographic evidence of vegetation  AORTIC VALVE:  A bioprosthesis was present  It exhibited normal function  There was no echocardiographic evidence of vegetation  TRICUSPID VALVE:  There was mild regurgitation    There was no echocardiographic evidence of vegetation  HISTORY: PRIOR HISTORY: Aortic valve replacement, NSTEMI, Cardiomyopathy, Acute kidney injury  PROCEDURE: The procedure was performed at the bedside  This was a routine study  The risks and alternatives of the procedure were explained to the patient's next of kin and informed consent was obtained  The transesophageal approach was  used  The study included complete 2D imaging, complete spectral Doppler, and color Doppler  The heart rate was 113 bpm, at the start of the study  An adult omniplane probe was inserted by the cardiology fellow under direct supervision of  the attending cardiologist  Intubated with ease  One intubation attempt(s)  There was no blood detected on the probe  Image quality was adequate  There were no complications during the procedure  MEDICATIONS: Sedation administered by  bedside nurse  LEFT VENTRICLE: Size was normal  Systolic function was moderately reduced  Ejection fraction was estimated to be 40 %  There was moderate diffuse hypokinesis  Wall thickness was mildly increased  There was mild concentric hypertrophy  DOPPLER: The study was not technically sufficient to allow evaluation of LV diastolic function  RIGHT VENTRICLE: The size was normal  Systolic function was mildly reduced  Wall thickness was normal     LEFT ATRIUM: The atrium was mildly dilated  No thrombus was identified  APPENDAGE: The appendage was small  No thrombus was identified  DOPPLER: The function was normal (normal emptying velocity)  ATRIAL SEPTUM: There was a small patent foramen ovale with left to right shunt identified by color Doppler  RIGHT ATRIUM: The atrium was mildly dilated  No thrombus was identified  MITRAL VALVE: Valve structure was normal  There was normal leaflet separation  There was no echocardiographic evidence of vegetation  DOPPLER: There was trace regurgitation  AORTIC VALVE: A bioprosthesis was present  It exhibited normal function   There was no echocardiographic evidence of vegetation  TRICUSPID VALVE: The valve structure was normal  There was normal leaflet separation  There was no echocardiographic evidence of vegetation  DOPPLER: There was mild regurgitation  PULMONIC VALVE: Leaflets exhibited normal thickness, no calcification, and normal cuspal separation  There was no echocardiographic evidence of vegetation  DOPPLER: There was no significant regurgitation  PERICARDIUM: There was no pericardial effusion seen in the images obtained  AORTA: The root exhibited normal size  There was no atheroma  There was no evidence for dissection  There was no evidence for aneurysm  [address] [facility]    Prepared and electronically signed by    Santa Parisi DO  Signed 25-Jan-2019 14:01:14       No results found for this or any previous visit  No results found for this or any previous visit      Meds/Allergies   all current active meds have been reviewed  Medications Prior to Admission   Medication    amLODIPine (NORVASC) 5 mg tablet    ascorbic acid (VITAMIN C) 500 MG tablet    aspirin (ECOTRIN) 325 mg EC tablet    fluticasone (FLONASE) 50 mcg/act nasal spray    loratadine (CLARITIN) 10 mg tablet    metoprolol tartrate (LOPRESSOR) 100 mg tablet    potassium chloride (K-DUR,KLOR-CON) 20 mEq tablet    pravastatin (PRAVACHOL) 40 mg tablet    torsemide (DEMADEX) 20 mg tablet         norepinephrine 1-30 mcg/min Last Rate: Stopped (02/23/19 1640)   NxStage K 4/Ca 3 20,000 mL    pantoprozole (PROTONIX) infusion (Continuous) 8 mg/hr Last Rate: 8 mg/hr (02/23/19 2230)   propofol 5-50 mcg/kg/min Last Rate: 20 mcg/kg/min (02/24/19 0905)   vasopressin (PITRESSIN) in 0 9 % sodium chloride 100 mL 0 04 Units/min Last Rate: Stopped (02/22/19 2232)     Assessment:  Principal Problem:    GI bleed  Active Problems:    Stress-induced cardiomyopathy    Acute respiratory failure with hypoxia (HCC)    History of aortic valve replacement with bioprosthetic valve    Atrial fibrillation (HCC)    Staphylococcus aureus bacteremia    Thrombocytopenia (HCC)    Acute blood loss anemia    Leukocytosis    Cerebrovascular accident ([address] )    Acute systolic CHF (congestive heart failure) (HCC)    Toxic metabolic encephalopathy    Skin rash    Acute on chronic renal failure (HCC)    Hypovolemic shock (HCC)    Coagulopathy (HCC)            ROS

## 2019-02-24 NOTE — PROGRESS NOTES
Progress Note - Critical Care   Naseem Lombardi 60 y o  male MRN: [id number]  Unit/Bed#: [facility] 959-18 Encounter: [id number]    Attending Physician: Carmen Joshi MD      ______________________________________________________________________  Assessment and Plan:   Principal Problem:    GI bleed  Active Problems:    Acute respiratory failure with hypoxia ([facility])    Acute blood loss anemia    Hypovolemic shock ([facility])    Staphylococcus aureus bacteremia    Stress-induced cardiomyopathy    History of aortic valve replacement with bioprosthetic valve    Atrial fibrillation ([facility])    Leukocytosis    Cerebrovascular accident ([facility] )    Acute systolic CHF (congestive heart failure) ([facility])    Toxic metabolic encephalopathy    Skin rash    Acute on chronic renal failure (HCC)    Coagulopathy ([facility])  Resolved Problems:    Acute encephalopathy    NSTEMI (non-ST elevated myocardial infarction) ([facility] )    ESRD (end stage renal disease) ([facility])    Rhabdomyolysis    Cardiogenic shock ([facility])    Septic shock ([facility])    Transaminitis    Thrombocytopenia ([facility])    Hypervolemia    Hyponatremia    Plan:    Neuro:   · Sedation plan/Daily sedation holiday: propofol  · RASS goal: -1 Drowsy and 0 Alert and Calm  · Pain controlled with:   · Fentanyl 50 mcg IV q2h PRN (1 dose/24h)  · Delirium Precautions  · CAM ICU per protocol  · Regulate sleep/wake cycle+  · Trend neuro exam  CV:   · Hemodynamic infusions: None  · MAP goal > 65  · Rhythm: NSR  · Follow rhythm on telemetry  Lung:   · Daily SBT assessment per protocol: until hgb stable, not appropriate for extubation   ·  Chlorhexidine/HOB>30degrees ordered: yes  · Pulmonary toileting  GI:   · GI bleed 2/2 gastric ulcer, potentially additional etiology given negative EGD yesterday: continue protonix drip, reglan/carafate, GI deferring colonoscopy today, CTA vs IR if continues to have drop in hgb, continue to trend hgb q6h  FEN:   · Goal 24 hour fluid balance: even to slightly positive   · Nutrition/diet plan: NPO  · Replete electrolytes with goals: K >4 0, Mag >2 0, and Phos >3 0  :   · Indwelling Turner present: no   · Strict I and O  · CRRT running even, attempt gentle volume removal   ID:   · Abx ordered: Ancef  · MSSA bacteremia, continue through 3/10, appreciate ID's management   · Trend temps and WBC count  Heme:   · Trend hgb and plts  · Transfuse as needed for goal hgb >7  Endo:   · Glycemic control plan: Blood glucose controlled on current regimen  MSK/Skin:  · Frequent turning and pressure off-loading  · Skin rash, possibly related to amiodarone  Family:  · Family updated within 24 hours: yes   · Family meeting planned today: no   Lines:  · Central venous access: Cordis catheter  · Keep central line today for IV access  · Arterial line: Assessed  Continued for the following reasons hemodynamic monitoring  VTE Prophylaxis:  · Pharmacologic Prophylaxis: Reason for no pharmacologic prophylaxis active GI bleed  · Mechanical Prophylaxis: sequential compression device    Disposition: Continue ICU care    Code Status: Level 1 - Full Code    ______________________________________________________________________    Chief Complaint: Patient intubated/sedated but denies any pain or difficulty breathing  24 Hour Events: Continued drop in hgb yesterday, received 4 PRBCs, 2 FFP, 1 plts and underwent repeat EGD  This did not identify a source of bleeding  Patient received an additional unit PRBCs overnight for continued anemia  Bowel prep initiated for colonoscopy today  Patient remained hemodynamically stable off vasopressor support  Review of Systems  ______________________________________________________________________    Physical Exam:   Physical Exam   Constitutional: He appears well-developed  He is intubated  HENT:   Head: Normocephalic and atraumatic  Eyes: Pupils are equal, round, and reactive to light  Neck: Neck supple  Cardiovascular: Normal rate and regular rhythm     Pulses:       Radial pulses are 2+ on the right side, and 2+ on the left side  Dorsalis pedis pulses are 2+ on the right side, and 2+ on the left side  Pulmonary/Chest: He is intubated  No respiratory distress  Abdominal: Soft  Bowel sounds are normal  There is no tenderness  Neurological: He is alert  GCS eye subscore is 4  GCS verbal subscore is 1  GCS motor subscore is 6  Briskly follows commands in all four extremities   Skin: Skin is warm  Rash (mostly on left leg) noted  Ischemic changes to left big toe     ______________________________________________________________________  Vitals:    19 0338 19 0400 19 0500 19 0600   BP:       Pulse:  90 88 96   Resp:  12 (!) 10 21   Temp:       TempSrc:       SpO2: 100% 100% 100% 99%   Weight:    (!) 165 kg (364 lb 13 8 oz)   Height:         /66  MAP 82    Temperature:   Temp (24hrs), Av 6 °F (37 °C), Min:97 8 °F (36 6 °C), Max:99 °F (37 2 °C)    Current Temperature: 98 5 °F (36 9 °C)  Weights:   IBW: 70 7 kg    Body mass index is 53 88 kg/m²    Weight (last 2 days)     Date/Time   Weight    19 0600   165 (364 86)  (Abnormal)     19 0600   166 (365 08)  (Abnormal)     19 0515   165 (363 76)  (Abnormal)                Non-Invasive/Invasive Ventilation Settings:  Respiratory    Lab Data (Last 4 hours)    None         O2/Vent Data (Last 4 hours)       0338           Vent Mode AC/VC       Resp Rate (BPM) (BPM) 12       Vt (mL) (mL) 500       FIO2 (%) (%) 40       PEEP (cmH2O) (cmH2O) 8       MV 13 2                 Intake and Outputs:  I/O        0701 -  0700  07 -  0700    I V  (mL/kg) 3190 9 (19 2) 1836 (11 1)    Blood 647 2615    NG/ 2097    IV Piggyback 946 2150 5    Total Intake(mL/kg) 4883 9 (29 4) 8698 5 (52 7)    Urine (mL/kg/hr) 0 (0) 0 (0)    Emesis/NG output 50 150    Other  6058    Stool 1000 800    Total Output 3057 7008    Net +1826 9 +1690 5          Unmeasured Stool Occurrence 4 x 7 x        CVVH:  Treatment Type: Continuous veno-venous hemodialysis  Goal-Desired Fluid Removal : 0 mL    Nutrition:        Diet Orders   (From admission, onward)            Start     Ordered    02/22/19 0221  Diet NPO  Diet effective now     Question Answer Comment   Diet Type NPO    RD to adjust diet per protocol? Yes        02/22/19 0220        Labs:   Results from last 7 days   Lab Units 02/23/19  2343 02/23/19  2059 02/23/19  1838 02/23/19  1223 02/23/19  0550 02/22/19  2342 02/22/19  1823 02/22/19  1218 02/22/19  6331 02/22/19  0137  02/21/19  2120  02/20/19  0831  02/18/19  0909 02/17/19  0704   WBC Thousand/uL  --   --  15 22*  --  18 90*  --   --   --  22 04* 22 28*  --  22 95*  --  11 91*  --  10 54* 9 42   HEMOGLOBIN g/dL 7 7* 8 0* 6 8* 6 5* 7 1* 7 0* 8 0* 7 8* 8 1* 7 0*   < > 6 4*   < > 7 4*   < > 7 0* 7 5*   I STAT HEMOGLOBIN   --   --   --   --   --   --   --   --   --   --    < >  --    < >  --   --   --   --    HEMATOCRIT %  --   --  20 5* 20 1* 21 2* 20 5* 23 3* 23 3* 24 9* 21 7*   < > 20 3*   < > 24 1*   < > 23 1* 25 3*   HEMATOCRIT, ISTAT   --   --   --   --   --   --   --   --   --   --    < >  --    < >  --   --   --   --    PLATELETS Thousands/uL  --   --  97*  --  82*  --   --   --  121* 150  --  188  --  226  --  238 239   NEUTROS PCT %  --   --   --   --   --   --   --   --   --   --   --   --   --   --   --   --  81*   BANDS PCT %  --   --   --   --   --   --   --   --  2  --   --   --   --   --   --   --   --    MONOS PCT %  --   --   --   --   --   --   --   --   --   --   --   --   --   --   --   --  7   MONO PCT %  --   --   --   --   --   --   --   --  4  --   --   --   --   --   --  3*  --     < > = values in this interval not displayed       Results from last 7 days   Lab Units 02/24/19  0559 02/23/19  2343 02/23/19  1839 02/23/19  1223 02/23/19  0550 02/22/19  2344 02/22/19  1823  02/22/19  0137  02/21/19  2120   SODIUM mmol/L 140 140 140 140 139  139 137 137   < > 136  --  134*   POTASSIUM mmol/L 4 1 3 8 3 8 4 1 3 9  3 9 4 1 3 9   < > 3 9  --  4 1   CHLORIDE mmol/L 108 107 107 108 106  107 106 104   < > 100  --  100   CO2 mmol/L 24 23 24 23 22  23 21 20*   < > 23  --  25   CO2, I-STAT   --   --   --   --   --   --   --   --   --    < >  --    ANION GAP mmol/L 8 10 9 9 11  9 10 13   < > 13  --  9   BUN mg/dL 28* 32* 35* 40* 46*  48* 60* 74*   < > 77*  --  74*   CREATININE mg/dL 2 10* 2 12* 2 29* 2 57* 2 94*  2 88* 3 43* 4 25*   < > 4 93*  --  5 16*   CALCIUM mg/dL 8 1* 8 0* 7 9* 7 7* 7 7*  8 0* 7 8* 7 6*   < > 7 0*  --  7 3*   ALT U/L  --   --   --   --   --   --   --   --  8*  --  <6*   AST U/L  --   --   --   --   --   --   --   --  12  --  12   ALK PHOS U/L  --   --   --   --   --   --   --   --  54  --  57   ALBUMIN g/dL  --   --   --   --   --   --   --   --  1 9*  --  1 9*   TOTAL BILIRUBIN mg/dL  --   --   --   --   --   --   --   --  0 74  --  0 65    < > = values in this interval not displayed  Results from last 7 days   Lab Units 02/24/19  0559 02/23/19  2343 02/23/19  1839 02/23/19  1223 02/23/19  0550 02/22/19  2344 02/22/19  1823   MAGNESIUM mg/dL 2 1 2 1 1 9 2 1 2 1  2 1 2 2 2 5   PHOSPHORUS mg/dL 2 4* 2 2* 2 5* 2 1* 2 2*  2 0* 2 0* 2 6*      Results from last 7 days   Lab Units 02/23/19  1839 02/23/19  0826 02/22/19  0637 02/22/19  0137 02/21/19 2120 02/21/19  0511 02/20/19  0831   INR  1 26* 1 27* 1 36* 1 48* 2 65* 1 89* 1 49*         Results from last 7 days   Lab Units 02/23/19  1839   LACTIC ACID mmol/L 0 7     Micro: Allergies:    Allergies   Allergen Reactions    Penicillins Rash     However, has subsequently tolerated Cefazolin and Cefepime     Medications:   Scheduled Meds:  Current Facility-Administered Medications:  acetaminophen 650 mg Oral Q6H PRN Lieutenant Elvira PA-C    atorvastatin 40 mg Oral Daily With Cheryle Moots HERBERT Foss    bisacodyl 10 mg Rectal Daily PRN Lieutenant Elvira PA-C    calcium gluconate 1 G  100 mL IVPB 1 g Intravenous Once Emry Amis, DO Last Rate: 1 g (02/24/19 0640)   cefazolin 2,000 mg Intravenous Q8H Sumit Winchester PA-C Last Rate: Stopped (02/24/19 0400)   fentanyl citrate (PF) 50 mcg Intravenous Q2H PRN Johan Fermin PA-C    guaiFENesin 600 mg Oral Q12H [facility] Sabina Kehr, PA-C    hydrocortisone  Topical BID Sabina Kehr, PA-C    iron sucrose 100 mg Intravenous Once per day on Tue Thu Sat Sabina Kehr, PA-C Last Rate: Stopped (02/23/19 1100)   metoclopramide 10 mg Intravenous Q6H [facility] Radhaaline Hannah PA-C    norepinephrine 1-30 mcg/min Intravenous Titrated Johan Fermin PA-C Last Rate: Stopped (02/23/19 1640)   NxStage K 4/Ca 3 20,000 mL Dialysis Continuous Emry Amis, DO    nystatin  Topical BID Sabina Kehr, PA-C    pantoprozole (PROTONIX) infusion (Continuous) 8 mg/hr Intravenous Continuous Sabina Kehr, PA-C Last Rate: 8 mg/hr (02/23/19 2230)   polyvinyl alcohol 1 drop Both Eyes Q3H PRN Sabina Kehr, PA-C    propofol 5-50 mcg/kg/min Intravenous Titrated Johan Fermin PA-C Last Rate: 20 mcg/kg/min (02/24/19 0513)   sodium phosphate 6 mmol Intravenous Once Emry Amis, DO    sucralfate 1,000 mg Oral BID AC Fern RASHIDA Hannah PA-C    vasopressin (PITRESSIN) in 0 9 % sodium chloride 100 mL 0 04 Units/min Intravenous Continuous Johan Fermin PA-C Last Rate: Stopped (02/22/19 2232)     Continuous Infusions:  norepinephrine 1-30 mcg/min Last Rate: Stopped (02/23/19 1640)   NxStage K 4/Ca 3 20,000 mL    pantoprozole (PROTONIX) infusion (Continuous) 8 mg/hr Last Rate: 8 mg/hr (02/23/19 2230)   propofol 5-50 mcg/kg/min Last Rate: 20 mcg/kg/min (02/24/19 0513)   vasopressin (PITRESSIN) in 0 9 % sodium chloride 100 mL 0 04 Units/min Last Rate: Stopped (02/22/19 2232)     PRN Meds:    acetaminophen 650 mg Q6H PRN   bisacodyl 10 mg Daily PRN   fentanyl citrate (PF) 50 mcg Q2H PRN   polyvinyl alcohol 1 drop Q3H PRN     VTE Pharmacologic Prophylaxis: Pharmacologic VTE Prophylaxis contraindicated due to active GI bleed  VTE Mechanical Prophylaxis: sequential compression device  Invasive lines and devices: Invasive Devices     Central Venous Catheter Line            Introducer 02/21/19 Internal jugular Left 2 days          Peripheral Intravenous Line            Peripheral IV 02/21/19 Left Antecubital 2 days    Peripheral IV 02/23/19 Left;Medial Antecubital 1 day    Peripheral IV 02/23/19 Right;Upper Arm 1 day          Arterial Line            Arterial Line 02/21/19 Radial 2 days          Hemodialysis Catheter            Permanent HD Catheter  5 days          Drain            NG/OG/Enteral Tube 2 days    Rectal Tube With balloon less than 1 day          Airway            ETT  Cuffed 8 mm 2 days                     Portions of the record may have been created with voice recognition software  Occasional wrong word or "sound a like" substitutions may have occurred due to the inherent limitations of voice recognition software  Read the chart carefully and recognize, using context, where substitutions have occurred      Radha Barron PA-C

## 2019-02-24 NOTE — PROGRESS NOTES
Progress Note - Infectious Disease   Yulissa Mayers 60 y o  male MRN: [id number]  Unit/Bed#: [facility] 518-01 Encounter: [id number]      Impression/Plan:  1  MSSA bacteremia   Possibility of endocarditis considered in the setting of bioprosthetic AVR  Silver Pro no evidence of valvular vegetations   Initial portal of entry may have been related to multiple upper back wounds   CT chest/abdomen/pelvis with no other evidence of acute infection   Possible from pneumonia as sputum culture was positive for MSSA   Bacteremia eventually cleared   Podiatry evaluated patient's feet and no acute issues   Neurology evaluation noted with patient's recent changes in mental status, imaging abnormalities felt to be ischemic and similar compared to prior studies   No plan for MRI        -continue IV cefazolin  -prolonged course of IV antibiotics of at least 6 weeks through 3/10  -monitor temperature/WBC  -with send blood cultures if patient spikes fever  -ongoing monitoring in the ICU     2  Rash:  Patient recently developed rash over this past week  It is noted to be flat and pruritic  Unclear etiology   Absolute eosinophil count slowly rising   Unclear if it is any particular medication and would question if this is a delayed response to Ancef   Patient's amiodarone was stopped given concern for his rash  El Flack is felt to be secondary to amiodarone  IV cefazolin was restarted  Rash improving despite cefazolin restart       -continue to monitor rash  -monitor WBC  -continue to trend fever curve/vitals     3  Acute GI bleed:  Patient had an acute episode of bleeding overnight   He was transferred to the ICU and had emergent endoscopy by GI   He remains intubated on pressors at this time      -ongoing follow-up by GI  -ongoing supportive care as per ICU  -continue antibiotics as above for now  -additional lab monitoring as per ICU       4  History of bioprosthetic AVR   Secondary to degenerative AS   Placed in July 2014   [facility] with no evidence of endocarditis   Ongoing follow-up by Cardiology      5  Acute kidney injury   Likely ischemic/septic ATN    Patient remains on hemodialysis   He is status post PermCath placement      -ongoing follow-up by Nephrology  -antibiotics currently dosed with dialysis     6  Shock  This is most likely secondary to GI bleed and hemorrhagic shock  Clinically, this is not septic shock  Hypotension improved      -antibiotic plan as in above  -monitor hemodynamics  -pressor support per Critical Care Medicine Service     7  Leukocytosis:  WBC stable  Patient continues to improve clinically        -continue to monitor fever curve/vitals  -repeat CBC tomorrow  -continue antibiotics as above     Discussed with patient       Antibiotics:  Cefazolin     Subjective:  Patient  remains in ICU  He remains intubated and sedated  Temperature low-grade  Overall status stable  Objective:  Vitals:  Temp:  [97 8 °F (36 6 °C)-98 9 °F (37 2 °C)] 98 6 °F (37 °C)  HR:  [] 92  Resp:  [5-30] 26  BP: (109-150)/(48-70) 150/70  SpO2:  [95 %-100 %] 100 %  Temp (24hrs), Av 6 °F (37 °C), Min:97 8 °F (36 6 °C), Max:98 9 °F (37 2 °C)  Current: Temperature: 98 6 °F (37 °C)    Physical Exam:     General: Awake, alert on ventilator, cooperative, no distress  Neck:  Supple  No mass  No lymphadenopathy  Lungs: Expansion symmetric, sparse basilar rhonchi and rales, no wheezing, respirations unlabored  Heart:  Of tachycardic with regular rhythm, S1 and S2 normal, no murmur  Abdomen: Soft, nondistended, non-tender, bowel sounds active all four quadrants,        no masses, no organomegaly  Extremities: Stable leg edema  No erythema/warmth  No ulcer  Nontender to palpation  Skin:  Improving rash  Neuro: Moves all extremities       Invasive Devices     Central Venous Catheter Line            Introducer 19 Internal jugular Left 2 days          Peripheral Intravenous Line            Peripheral IV 19 Left Antecubital 2 days    Peripheral IV 02/23/19 Left;Medial Antecubital 1 day    Peripheral IV 02/23/19 Right;Upper Arm 1 day          Arterial Line            Arterial Line 02/21/19 Radial 2 days          Hemodialysis Catheter            Permanent HD Catheter  6 days          Drain            NG/OG/Enteral Tube 2 days    Rectal Tube With balloon less than 1 day          Airway            ETT  Cuffed 8 mm 2 days                Labs studies:   I have personally reviewed pertinent labs  Results from last 7 days   Lab Units 02/24/19  1224 02/24/19  0559 02/23/19 2343 02/22/19  0137 02/21/19  2243 02/21/19 2120 02/21/19 2040   POTASSIUM mmol/L 3 6 4 1 3 8   < > 3 9  --  4 1   < >  --    CHLORIDE mmol/L 108 108 107   < > 100  --  100   < >  --    CO2 mmol/L 25 24 23   < > 23  --  25   < >  --    CO2, I-STAT mmol/L  --   --   --   --   --  26  --   --  25   BUN mg/dL 25 28* 32*   < > 77*  --  74*   < >  --    CREATININE mg/dL 1 94* 2 10* 2 12*   < > 4 93*  --  5 16*   < >  --    EGFR ml/min/1 73sq m 37 33 33   < > 12  --  11   < >  --    GLUCOSE, ISTAT mg/dl  --   --   --   --   --  117  --   --  115   CALCIUM mg/dL 7 9* 8 1* 8 0*   < > 7 0*  --  7 3*   < >  --    AST U/L  --   --   --   --  12  --  12  --   --    ALT U/L  --   --   --   --  8*  --  <6*  --   --    ALK PHOS U/L  --   --   --   --  54  --  57  --   --     < > = values in this interval not displayed  Results from last 7 days   Lab Units 02/24/19  1224 02/24/19  0559 02/23/19 2343 02/23/19  1838  02/23/19  0550   WBC Thousand/uL  --  15 57*  --   --  15 22*  --  18 90*   HEMOGLOBIN g/dL 7 7* 8 3* 7 7*   < > 6 8*   < > 7 1*   PLATELETS Thousands/uL  --  86*  --   --  97*  --  82*    < > = values in this interval not displayed  Imaging Studies:   I have personally reviewed pertinent imaging study reports and images in PACS  EKG, Pathology, and Other Studies:   I have personally reviewed pertinent reports

## 2019-02-24 NOTE — PROGRESS NOTES
Dr Kristie Dixon at bedside to perform EGD  CECILE Bey CCm present following Hmb 6 5 following 1u RBC  Previously 7 0  Administered Fentanyl and versed per orders  Time out performed  Pt hemodynamically stable prior and during procedure  VSS AC on Vent for procedure  Plan is to consider colonoscopy tomorrow following bowel prep and monitor labs and patient closely  Provided emotional support to pt

## 2019-02-24 NOTE — RESPIRATORY THERAPY NOTE
RT Ventilator Management Note  Martha Roth 60 y o  male MRN: [id number]  Unit/Bed#: [facility] 518-01 Encounter: [id number]      Daily Screen       2/23/2019 0828 2/24/2019 0846          Patient safety screen outcome[de-identified]  Passed  Failed      Not Ready for Weaning due to[de-identified]    Underline problem not resolved      Spont breathing trial % for 30 min:  Yes        Spont breathing trial outcome[de-identified]  Passed        RSBI:  51                Physical Exam:   Assessment Type: Assess only  General Appearance: Drowsy  Respiratory Pattern: Assisted  Chest Assessment: Chest expansion symmetrical  Bilateral Breath Sounds: Diminished      Resp Comments: poss procedure today  attempted psv wean  Pt has increased resp rate at this time  Will try again later today if no procedure and pt more awake

## 2019-02-24 NOTE — RESPIRATORY THERAPY NOTE
resp care      02/24/19 1049   Respiratory Assessment   Resp Comments Pt awake and cooperative  Vent mode change to psv  Pt appears comfortable  Will cont to monitor      Additional Assessments   SpO2 100 %

## 2019-02-24 NOTE — PROGRESS NOTES
NEPHROLOGY PROGRESS NOTE   Sushila Martines 60 y o  male MRN: [id number]  Unit/Bed#: [facility] 232-09 Encounter: [id number]      ASSESSMENT & PLAN:    1  Acute kidney injury secondary to ATN will be in acute as an outpatient Tuesday Thursday Saturday  -seen on CVVHD at 8:20 a m   -run even for now, okay to be positive as well in the setting of GI bleed if still active  -given his need for ongoing blood products and continues drop of hemoglobin will continue CVVHD given his cardiomyopathy to avoid volume overload but would continue to run him even over the next 24 hours-it appears that right now his hemoglobin is stable  -would favor volume expansion in the setting of GI bleed monitor respiratory status    2  Access: Tunneled dialysis catheter replaced by IR  -tunneled dialysis catheter remains in place and in use currently on CVVH    3  MSSA bacteremia on 6 weeks of antibiotics  -continues on vancomycin for bacteremia    4  Bioprosthetic valve  -continue to monitor cardiac function    5  Atrial fibrillation  -currently heart rates are acceptable  -currently no anticoagulation given GI bleed    6  Anemia of chronic kidney disease  -patient had a GI bleed requiring urgent endoscopy now hemoglobin remained stable still remains on pressors monitor H&H  -hemoglobin did drop again today may require further blood transfusion    7  Ischemic cardiomyopathy with an EF 30%  -continue ultrafiltration as tolerated and keep even eyes and nose given cardiomyopathy    8   CKD bone mineral disease  -continue PhosLo for hyperphosphatemia    9  atrial fibrillation  -on Coumadin for anticoagulation now on hold because of GI bleed      SUBJECTIVE:    Overnight required further blood transfusions blood pressures remained stable off pressors currently    OBJECTIVE:  Current Weight: Weight - Scale: (!) 165 kg (364 lb 13 8 oz)  Vitals:    02/24/19 0800   BP:    Pulse: 82   Resp: 16   Temp:    SpO2: 100%     Vitals:    02/24/19 0500 02/24/19 0600 02/24/19 0700 02/24/19 0800   BP:       Pulse: 88 96 82 82   Resp: (!) 10 21 16 16   Temp:   98 4 °F (36 9 °C)    TempSrc:   Oral    SpO2: 100% 99% 100% 100%   Weight:  (!) 165 kg (364 lb 13 8 oz)     Height:               Intake/Output Summary (Last 24 hours) at 2/24/2019 0940  Last data filed at 2/24/2019 0800  Gross per 24 hour   Intake 9333 5 ml   Output 7695 ml   Net 1638 5 ml       General: conscious, cooperative, in not acute distress  Eyes: conjunctivae pink, anicteric sclerae  ENT:  ET tube in place  Neck: supple, no JVD  Chest: clear breath sounds bilateral, no crackles, ronchus or wheezings  CVS: distinct S1 & S2, normal rate, regular rhythm  Abdomen: soft, non-tender, non-distended, normoactive bowel sounds  Extremities:  2+ edema  Skin: no rash  Neuro:  Sedated          Medications:    Current Facility-Administered Medications:     acetaminophen (TYLENOL) tablet 650 mg, 650 mg, Oral, Q6H PRN, CECILE Awan-C, 650 mg at 02/20/19 2007    atorvastatin (LIPITOR) tablet 40 mg, 40 mg, Oral, Daily With Dearl CaneHERBERT, 40 mg at 02/23/19 1600    bisacodyl (DULCOLAX) rectal suppository 10 mg, 10 mg, Rectal, Daily PRN, Gisselle Banegas PA-C    ceFAZolin (ANCEF) 2,000 mg in sodium chloride 0 9 % 50 mL IVPB, 2,000 mg, Intravenous, Q8H, CECILE Jara-C, Stopped at 02/24/19 0400    chlorhexidine (PERIDEX) 0 12 % oral rinse 15 mL, 15 mL, Swish & Spit, Q12H [address], Mode Hagan PA-C, 15 mL at 02/24/19 3153    fentanyl citrate (PF) 100 MCG/2ML 50 mcg, 50 mcg, Intravenous, Q2H PRN, CECILE Pineda-C, 50 mcg at 02/23/19 2048    guaiFENesin (MUCINEX) 12 hr tablet 600 mg, 600 mg, Oral, Q12H [address], Lamona Ekalaka, PA-C, 600 mg at 02/24/19 0051    hydrocortisone 1 % cream, , Topical, BID, Gisselle Banegas PA-C    iron sucrose (VENOFER) 100 mg in sodium chloride 0 9 % 100 mL IVPB, 100 mg, Intravenous, Once per day on Tue Thu Sat, Gisselle Banegas PA-C, Stopped at 02/23/19 1100    metoclopramide (REGLAN) injection 10 mg, 10 mg, Intravenous, Q6H [facility], Fern CECILE Castrejon-C, 10 mg at 02/24/19 0513    norepinephrine (LEVOPHED) 4 mg (STANDARD CONCENTRATION) IV in sodium chloride 0 9% 250 mL, 1-30 mcg/min, Intravenous, Titrated, JIM LeoneC, Stopped at 02/23/19 1640    NxStage K 4/Ca 3 dialysis solution (RFP-401) 20,000 mL, 20,000 mL, Dialysis, Continuous, Dimitry Barboza, DO, 20,000 mL at 02/24/19 0810    nystatin (MYCOSTATIN) powder, , Topical, BID, Sabattus Macandres PA-C    pantoprazole (PROTONIX) 80 mg in sodium chloride 0 9 % 100 mL infusion, 8 mg/hr, Intravenous, Continuous, Tonia Maclemeek PA-C, Last Rate: 10 mL/hr at 02/23/19 2230, 8 mg/hr at 02/23/19 2230    polyvinyl alcohol (LIQUIFILM TEARS) 1 4 % ophthalmic solution 1 drop, 1 drop, Both Eyes, Q3H PRN, JIM ZayasC, 1 drop at 02/13/19 1724    propofol (DIPRIVAN) 1000 mg in 100 mL infusion (premix), 5-50 mcg/kg/min, Intravenous, Titrated, Andres Hannah PA-C, Last Rate: 19 9 mL/hr at 02/24/19 0905, 20 mcg/kg/min at 02/24/19 0905    sodium phosphate 6 mmol in sodium chloride 0 9 % 100 mL Infusion, 6 mmol, Intravenous, Once, Dimitry Barboza DO, Last Rate: 50 mL/hr at 02/24/19 0800, 6 mmol at 02/24/19 0800    sucralfate (CARAFATE) oral suspension 1,000 mg, 1,000 mg, Oral, BID AC, Fern R Vernmuson, PA-C, 1,000 mg at 02/23/19 1700    vasopressin (PITRESSIN) 20 Units in sodium chloride 0 9 % 100 mL infusion, 0 04 Units/min, Intravenous, Continuous, FernCECILE Talamantes-C, Stopped at 02/22/19 2232    Invasive Devices:      Lab Results:   Results from last 7 days   Lab Units 02/24/19  0559 02/23/19  2343 02/23/19  2059 02/23/19  1839 02/23/19  1838 02/23/19  1223 02/23/19  0550  02/22/19  2342  02/22/19  0637 02/22/19  0137 02/21/19  2243  02/21/19  2120  02/21/19  2040   WBC Thousand/uL 15 57*  --   --   --  15 22*  --  18 90*  --   --   --  22 04* 22 28*  --   --  22 95*  --   --    HEMOGLOBIN g/dL 8 3* 7 7* 8 0*  --  6 8* 6 5* 7 1*  --  7 0* < > 8 1* 7 0*  --    < > 6 4*  --   --    I STAT HEMOGLOBIN g/dl  --   --   --   --   --   --   --   --   --   --   --   --  7 1*  --   --   --  5 8*   HEMATOCRIT % 25 5*  --   --   --  20 5* 20 1* 21 2*  --  20 5*   < > 24 9* 21 7*  --    < > 20 3*  --   --    HEMATOCRIT, ISTAT %  --   --   --   --   --   --   --   --   --   --   --   --  21*  --   --   --  17*   PLATELETS Thousands/uL 86*  --   --   --  97*  --  82*  --   --   --  121* 150  --   --  188  --   --    POTASSIUM mmol/L 4 1 3 8  --  3 8  --  4 1 3 9  3 9   < >  --    < > 4 0 3 9  --   --  4 1   < >  --    CHLORIDE mmol/L 108 107  --  107  --  108 106  107   < >  --    < > 100 100  --   --  100   < >  --    CO2 mmol/L 24 23  --  24  --  23 22  23   < >  --    < > 20* 23  --   --  25   < >  --    CO2, I-STAT mmol/L  --   --   --   --   --   --   --   --   --   --   --   --  26  --   --   --  25   BUN mg/dL 28* 32*  --  35*  --  40* 46*  48*   < >  --    < > 82* 77*  --   --  74*   < >  --    CREATININE mg/dL 2 10* 2 12*  --  2 29*  --  2 57* 2 94*  2 88*   < >  --    < > 4 96* 4 93*  --   --  5 16*   < >  --    CALCIUM mg/dL 8 1* 8 0*  --  7 9*  --  7 7* 7 7*  8 0*   < >  --    < > 6 7* 7 0*  --   --  7 3*   < >  --    MAGNESIUM mg/dL 2 1 2 1  --  1 9  --  2 1 2 1  2 1   < >  --    < > 2 5 2 4  --   --  2 6   < >  --    PHOSPHORUS mg/dL 2 4* 2 2*  --  2 5*  --  2 1* 2 2*  2 0*   < >  --    < > 3 9 3 7  --   --  3 9   < >  --    ALK PHOS U/L  --   --   --   --   --   --   --   --   --   --   --  54  --   --  57  --   --    ALT U/L  --   --   --   --   --   --   --   --   --   --   --  8*  --   --  <6*  --   --    AST U/L  --   --   --   --   --   --   --   --   --   --   --  12  --   --  12  --   --    GLUCOSE, ISTAT mg/dl  --   --   --   --   --   --   --   --   --   --   --   --  117  --   --   --  115    < > = values in this interval not displayed         Previous work up:  Please see previous notes

## 2019-02-24 NOTE — PROGRESS NOTES
Patient was received a total of 13 units of packed red blood cells since onset of GI bleeding  Friday at 4:00 a m  Endoscopy performed and identified a large amount of blood in the stomach as well as an ulcer in the antrum/pre-pyloric region  Friday during the day patient required ongoing vasopressors for hemodynamic support  Early morning on Saturday patient had a hemoglobin noted to be 7 0  He received 1 unit of packed red blood cells without improvement in his hemoglobin  Repeat hemoglobin 7 0 then decreased to 6 5, additional 2 units of packed red blood cells administered  Repeat hemoglobin after 6 5 reported hemoglobin increased to 6 8  Patient appears to have ongoing blood loss  Additional unit of packed red blood cells ordered  Gastroenterology contacted regarding concern for ongoing bleeding  Patient did have an endoscopy done at for 4:00 p m  Today which did not show any acute bleeding or stigmata of recent bleeding  GI recommends obtaining CTA as they were not impressed with endoscopy and plan for colonoscopy tomorrow after patient has received bowel prep  Discussed case with surgery and interventional Radiology regarding plan of care, patient may undergo interventional radiology embolization of left gastric if he does not respond to this next unit of packed red blood cells  Patient's wife is updated as to plan of care  Interventional Radiology will discuss with surgery interventions      Critical care time 45 minutes, critical care time does not include family update

## 2019-02-24 NOTE — PROGRESS NOTES
Progress Note - General Surgery   Mervat Galloway 60 y o  male MRN: [id number]  Unit/Bed#: [facility] 518-01 Encounter: [id number]    Assessment:  60 y o  M w/ GI bleeding of unclear etiology suspected UGI 2/2 antral ulcer s/p EGD w/ injection and clipping    Required continuing transfusions yesterday w/ bowel prep for possible Cscope    Off pressors and Hgb appears stable this AM, rectal tube output still bloody    Plan:  Cont trend Hgb  Consider IR if continuing upper GI bleed without resolution  Appreciate GI recommendations regarding need for Cscope    Subjective/Objective   Chief Complaint:     Subjective:     Objective:     Blood pressure 150/70, pulse 82, temperature 98 4 °F (36 9 °C), temperature source Oral, resp  rate 16, height 5' 9" (1 753 m), weight (!) 165 kg (364 lb 13 8 oz), SpO2 100 %  ,Body mass index is 53 88 kg/m²        Intake/Output Summary (Last 24 hours) at 2/24/2019 0958  Last data filed at 2/24/2019 7867  Gross per 24 hour   Intake 9690 5 ml   Output 8093 ml   Net 1597 5 ml       Invasive Devices     Central Venous Catheter Line            Introducer 02/21/19 Internal jugular Left 2 days          Peripheral Intravenous Line            Peripheral IV 02/21/19 Left Antecubital 2 days    Peripheral IV 02/23/19 Left;Medial Antecubital 1 day    Peripheral IV 02/23/19 Right;Upper Arm 1 day          Arterial Line            Arterial Line 02/21/19 Radial 2 days          Hemodialysis Catheter            Permanent HD Catheter  5 days          Drain            NG/OG/Enteral Tube 2 days    Rectal Tube With balloon less than 1 day          Airway            ETT  Cuffed 8 mm 2 days                Physical Exam:   Intubated, sedated  ABd soft, NTND  Rectal tube output w/ melenotic output    Lab, Imaging and other studies:  CBC:   Lab Results   Component Value Date    WBC 15 57 (H) 02/24/2019    HGB 8 3 (L) 02/24/2019    HCT 25 5 (L) 02/24/2019    MCV 91 02/24/2019    PLT 86 (L) 02/24/2019    MCH 29 5 02/24/2019 MCHC 32 5 02/24/2019    RDW 16 3 (H) 02/24/2019    MPV 10 9 02/24/2019    NRBC 0 02/24/2019   , CMP:   Lab Results   Component Value Date    SODIUM 140 02/24/2019    K 4 1 02/24/2019     02/24/2019    CO2 24 02/24/2019    BUN 28 (H) 02/24/2019    CREATININE 2 10 (H) 02/24/2019    CALCIUM 8 1 (L) 02/24/2019    EGFR 33 02/24/2019   , Coagulation:   Lab Results   Component Value Date    INR 1 26 (H) 02/24/2019

## 2019-02-25 PROBLEM — K92.2 GI BLEEDING: Status: ACTIVE | Noted: 2019-01-24

## 2019-02-25 LAB
ABO GROUP BLD BPU: NORMAL
ANION GAP SERPL CALCULATED.3IONS-SCNC: 6 MMOL/L (ref 4–13)
ANION GAP SERPL CALCULATED.3IONS-SCNC: 6 MMOL/L (ref 4–13)
ANION GAP SERPL CALCULATED.3IONS-SCNC: 8 MMOL/L (ref 4–13)
ANION GAP SERPL CALCULATED.3IONS-SCNC: 8 MMOL/L (ref 4–13)
ATRIAL RATE: 105 BPM
ATRIAL RATE: 121 BPM
BASOPHILS # BLD AUTO: 0.05 THOUSANDS/ΜL (ref 0–0.1)
BASOPHILS NFR BLD AUTO: 0 % (ref 0–1)
BPU ID: NORMAL
BUN SERPL-MCNC: 10 MG/DL (ref 5–25)
BUN SERPL-MCNC: 10 MG/DL (ref 5–25)
BUN SERPL-MCNC: 13 MG/DL (ref 5–25)
BUN SERPL-MCNC: 17 MG/DL (ref 5–25)
CA-I BLD-SCNC: 1.13 MMOL/L (ref 1.12–1.32)
CA-I BLD-SCNC: 1.14 MMOL/L (ref 1.12–1.32)
CA-I BLD-SCNC: 1.16 MMOL/L (ref 1.12–1.32)
CALCIUM SERPL-MCNC: 7.8 MG/DL (ref 8.3–10.1)
CALCIUM SERPL-MCNC: 7.9 MG/DL (ref 8.3–10.1)
CALCIUM SERPL-MCNC: 8 MG/DL (ref 8.3–10.1)
CALCIUM SERPL-MCNC: 8.1 MG/DL (ref 8.3–10.1)
CHLORIDE SERPL-SCNC: 107 MMOL/L (ref 100–108)
CHLORIDE SERPL-SCNC: 107 MMOL/L (ref 100–108)
CHLORIDE SERPL-SCNC: 108 MMOL/L (ref 100–108)
CHLORIDE SERPL-SCNC: 109 MMOL/L (ref 100–108)
CO2 SERPL-SCNC: 25 MMOL/L (ref 21–32)
CO2 SERPL-SCNC: 26 MMOL/L (ref 21–32)
CREAT SERPL-MCNC: 0.95 MG/DL (ref 0.6–1.3)
CREAT SERPL-MCNC: 1.01 MG/DL (ref 0.6–1.3)
CREAT SERPL-MCNC: 1.14 MG/DL (ref 0.6–1.3)
CREAT SERPL-MCNC: 1.38 MG/DL (ref 0.6–1.3)
EOSINOPHIL # BLD AUTO: 1.85 THOUSAND/ΜL (ref 0–0.61)
EOSINOPHIL NFR BLD AUTO: 15 % (ref 0–6)
ERYTHROCYTE [DISTWIDTH] IN BLOOD BY AUTOMATED COUNT: 16.4 % (ref 11.6–15.1)
GFR SERPL CREATININE-BSD FRML MDRD: 56 ML/MIN/1.73SQ M
GFR SERPL CREATININE-BSD FRML MDRD: 70 ML/MIN/1.73SQ M
GFR SERPL CREATININE-BSD FRML MDRD: 81 ML/MIN/1.73SQ M
GFR SERPL CREATININE-BSD FRML MDRD: 87 ML/MIN/1.73SQ M
GLUCOSE SERPL-MCNC: 76 MG/DL (ref 65–140)
GLUCOSE SERPL-MCNC: 77 MG/DL (ref 65–140)
GLUCOSE SERPL-MCNC: 78 MG/DL (ref 65–140)
GLUCOSE SERPL-MCNC: 78 MG/DL (ref 65–140)
HCT VFR BLD AUTO: 25.6 % (ref 36.5–49.3)
HGB BLD-MCNC: 8.2 G/DL (ref 12–17)
HGB BLD-MCNC: 8.2 G/DL (ref 12–17)
HGB BLD-MCNC: 8.4 G/DL (ref 12–17)
HGB BLD-MCNC: 8.6 G/DL (ref 12–17)
IMM GRANULOCYTES # BLD AUTO: >0.5 THOUSAND/UL (ref 0–0.2)
IMM GRANULOCYTES NFR BLD AUTO: 9 % (ref 0–2)
LYMPHOCYTES # BLD AUTO: 0.64 THOUSANDS/ΜL (ref 0.6–4.47)
LYMPHOCYTES NFR BLD AUTO: 5 % (ref 14–44)
MAGNESIUM SERPL-MCNC: 1.9 MG/DL (ref 1.6–2.6)
MAGNESIUM SERPL-MCNC: 1.9 MG/DL (ref 1.6–2.6)
MAGNESIUM SERPL-MCNC: 2 MG/DL (ref 1.6–2.6)
MAGNESIUM SERPL-MCNC: 2.1 MG/DL (ref 1.6–2.6)
MCH RBC QN AUTO: 29.6 PG (ref 26.8–34.3)
MCHC RBC AUTO-ENTMCNC: 32 G/DL (ref 31.4–37.4)
MCV RBC AUTO: 92 FL (ref 82–98)
MONOCYTES # BLD AUTO: 0.92 THOUSAND/ΜL (ref 0.17–1.22)
MONOCYTES NFR BLD AUTO: 7 % (ref 4–12)
NEUTROPHILS # BLD AUTO: 7.92 THOUSANDS/ΜL (ref 1.85–7.62)
NEUTS SEG NFR BLD AUTO: 64 % (ref 43–75)
NRBC BLD AUTO-RTO: 0 /100 WBCS
P AXIS: 66 DEGREES
PHOSPHATE SERPL-MCNC: 1.9 MG/DL (ref 2.7–4.5)
PLATELET # BLD AUTO: 92 THOUSANDS/UL (ref 149–390)
PMV BLD AUTO: 10.6 FL (ref 8.9–12.7)
POTASSIUM SERPL-SCNC: 3.6 MMOL/L (ref 3.5–5.3)
POTASSIUM SERPL-SCNC: 3.9 MMOL/L (ref 3.5–5.3)
POTASSIUM SERPL-SCNC: 4 MMOL/L (ref 3.5–5.3)
POTASSIUM SERPL-SCNC: 4.1 MMOL/L (ref 3.5–5.3)
PR INTERVAL: 208 MS
QRS AXIS: 106 DEGREES
QRS AXIS: 112 DEGREES
QRSD INTERVAL: 171 MS
QRSD INTERVAL: 171 MS
QT INTERVAL: 392 MS
QT INTERVAL: 433 MS
QTC INTERVAL: 557 MS
QTC INTERVAL: 573 MS
RBC # BLD AUTO: 2.77 MILLION/UL (ref 3.88–5.62)
SODIUM SERPL-SCNC: 139 MMOL/L (ref 136–145)
SODIUM SERPL-SCNC: 140 MMOL/L (ref 136–145)
SODIUM SERPL-SCNC: 140 MMOL/L (ref 136–145)
SODIUM SERPL-SCNC: 141 MMOL/L (ref 136–145)
T WAVE AXIS: -66 DEGREES
T WAVE AXIS: -69 DEGREES
UNIT DISPENSE STATUS: NORMAL
UNIT PRODUCT CODE: NORMAL
UNIT RH: NORMAL
VENTRICULAR RATE: 105 BPM
VENTRICULAR RATE: 121 BPM
WBC # BLD AUTO: 12.51 THOUSAND/UL (ref 4.31–10.16)

## 2019-02-25 PROCEDURE — 90945 DIALYSIS ONE EVALUATION: CPT | Performed by: INTERNAL MEDICINE

## 2019-02-25 PROCEDURE — 84100 ASSAY OF PHOSPHORUS: CPT | Performed by: INTERNAL MEDICINE

## 2019-02-25 PROCEDURE — 43239 EGD BIOPSY SINGLE/MULTIPLE: CPT | Performed by: INTERNAL MEDICINE

## 2019-02-25 PROCEDURE — 94003 VENT MGMT INPAT SUBQ DAY: CPT

## 2019-02-25 PROCEDURE — 45380 COLONOSCOPY AND BIOPSY: CPT | Performed by: INTERNAL MEDICINE

## 2019-02-25 PROCEDURE — C9113 INJ PANTOPRAZOLE SODIUM, VIA: HCPCS | Performed by: PHYSICIAN ASSISTANT

## 2019-02-25 PROCEDURE — 82330 ASSAY OF CALCIUM: CPT | Performed by: INTERNAL MEDICINE

## 2019-02-25 PROCEDURE — 94760 N-INVAS EAR/PLS OXIMETRY 1: CPT

## 2019-02-25 PROCEDURE — 93010 ELECTROCARDIOGRAM REPORT: CPT | Performed by: INTERNAL MEDICINE

## 2019-02-25 PROCEDURE — 0DBM8ZX EXCISION OF DESCENDING COLON, VIA NATURAL OR ARTIFICIAL OPENING ENDOSCOPIC, DIAGNOSTIC: ICD-10-PCS | Performed by: INTERNAL MEDICINE

## 2019-02-25 PROCEDURE — 85018 HEMOGLOBIN: CPT | Performed by: PHYSICIAN ASSISTANT

## 2019-02-25 PROCEDURE — 99232 SBSQ HOSP IP/OBS MODERATE 35: CPT | Performed by: INTERNAL MEDICINE

## 2019-02-25 PROCEDURE — 83735 ASSAY OF MAGNESIUM: CPT | Performed by: INTERNAL MEDICINE

## 2019-02-25 PROCEDURE — 88305 TISSUE EXAM BY PATHOLOGIST: CPT | Performed by: PATHOLOGY

## 2019-02-25 PROCEDURE — 85025 COMPLETE CBC W/AUTO DIFF WBC: CPT | Performed by: PHYSICIAN ASSISTANT

## 2019-02-25 PROCEDURE — 90945 DIALYSIS ONE EVALUATION: CPT

## 2019-02-25 PROCEDURE — 0DBN8ZX EXCISION OF SIGMOID COLON, VIA NATURAL OR ARTIFICIAL OPENING ENDOSCOPIC, DIAGNOSTIC: ICD-10-PCS | Performed by: INTERNAL MEDICINE

## 2019-02-25 PROCEDURE — 0DB68ZX EXCISION OF STOMACH, VIA NATURAL OR ARTIFICIAL OPENING ENDOSCOPIC, DIAGNOSTIC: ICD-10-PCS | Performed by: INTERNAL MEDICINE

## 2019-02-25 PROCEDURE — 99291 CRITICAL CARE FIRST HOUR: CPT | Performed by: EMERGENCY MEDICINE

## 2019-02-25 PROCEDURE — 80048 BASIC METABOLIC PNL TOTAL CA: CPT | Performed by: INTERNAL MEDICINE

## 2019-02-25 PROCEDURE — 93005 ELECTROCARDIOGRAM TRACING: CPT

## 2019-02-25 RX ORDER — MIDAZOLAM HYDROCHLORIDE 1 MG/ML
2 INJECTION INTRAMUSCULAR; INTRAVENOUS ONCE
Status: DISCONTINUED | OUTPATIENT
Start: 2019-02-25 | End: 2019-02-25

## 2019-02-25 RX ORDER — PANTOPRAZOLE SODIUM 40 MG/1
40 INJECTION, POWDER, FOR SOLUTION INTRAVENOUS EVERY 12 HOURS SCHEDULED
Status: DISCONTINUED | OUTPATIENT
Start: 2019-02-25 | End: 2019-02-27

## 2019-02-25 RX ORDER — MIDAZOLAM HYDROCHLORIDE 1 MG/ML
1 INJECTION INTRAMUSCULAR; INTRAVENOUS ONCE
Status: COMPLETED | OUTPATIENT
Start: 2019-02-25 | End: 2019-02-25

## 2019-02-25 RX ORDER — FENTANYL CITRATE 50 UG/ML
25 INJECTION, SOLUTION INTRAMUSCULAR; INTRAVENOUS ONCE
Status: COMPLETED | OUTPATIENT
Start: 2019-02-25 | End: 2019-02-25

## 2019-02-25 RX ORDER — MIDAZOLAM HYDROCHLORIDE 1 MG/ML
INJECTION INTRAMUSCULAR; INTRAVENOUS
Status: DISPENSED
Start: 2019-02-25 | End: 2019-02-25

## 2019-02-25 RX ORDER — METOPROLOL TARTRATE 5 MG/5ML
2.5 INJECTION INTRAVENOUS ONCE
Status: COMPLETED | OUTPATIENT
Start: 2019-02-25 | End: 2019-02-25

## 2019-02-25 RX ORDER — METOPROLOL TARTRATE 5 MG/5ML
5 INJECTION INTRAVENOUS ONCE
Status: COMPLETED | OUTPATIENT
Start: 2019-02-25 | End: 2019-02-25

## 2019-02-25 RX ORDER — MAGNESIUM SULFATE 1 G/100ML
1 INJECTION INTRAVENOUS ONCE
Status: COMPLETED | OUTPATIENT
Start: 2019-02-25 | End: 2019-02-25

## 2019-02-25 RX ORDER — POTASSIUM CHLORIDE 29.8 MG/ML
40 INJECTION INTRAVENOUS ONCE
Status: COMPLETED | OUTPATIENT
Start: 2019-02-25 | End: 2019-02-25

## 2019-02-25 RX ORDER — FENTANYL CITRATE 50 UG/ML
100 INJECTION, SOLUTION INTRAMUSCULAR; INTRAVENOUS EVERY 2 HOUR PRN
Status: DISCONTINUED | OUTPATIENT
Start: 2019-02-25 | End: 2019-02-27

## 2019-02-25 RX ADMIN — HYDROCORTISONE 1 APPLICATION: 1 CREAM TOPICAL at 08:22

## 2019-02-25 RX ADMIN — MAGNESIUM SULFATE HEPTAHYDRATE 1 G: 1 INJECTION, SOLUTION INTRAVENOUS at 16:04

## 2019-02-25 RX ADMIN — SODIUM PHOSPHATE, MONOBASIC, MONOHYDRATE 9 MMOL: 276; 142 INJECTION, SOLUTION INTRAVENOUS at 16:15

## 2019-02-25 RX ADMIN — METOPROLOL TARTRATE 25 MG: 25 TABLET, FILM COATED ORAL at 20:18

## 2019-02-25 RX ADMIN — CEFAZOLIN SODIUM 2000 MG: 10 INJECTION, POWDER, FOR SOLUTION INTRAVENOUS at 17:30

## 2019-02-25 RX ADMIN — PROPOFOL 20 MCG/KG/MIN: 10 INJECTION, EMULSION INTRAVENOUS at 16:09

## 2019-02-25 RX ADMIN — CHLORHEXIDINE GLUCONATE 0.12% ORAL RINSE 15 ML: 1.2 LIQUID ORAL at 20:18

## 2019-02-25 RX ADMIN — MIDAZOLAM 1 MG: 1 INJECTION INTRAMUSCULAR; INTRAVENOUS at 11:30

## 2019-02-25 RX ADMIN — DEXMEDETOMIDINE 0.2 MCG/KG/HR: 100 INJECTION, SOLUTION, CONCENTRATE INTRAVENOUS at 18:40

## 2019-02-25 RX ADMIN — PROPOFOL 20 MCG/KG/MIN: 10 INJECTION, EMULSION INTRAVENOUS at 08:50

## 2019-02-25 RX ADMIN — METOCLOPRAMIDE 10 MG: 5 INJECTION, SOLUTION INTRAMUSCULAR; INTRAVENOUS at 17:30

## 2019-02-25 RX ADMIN — Medication 20000 ML: at 21:19

## 2019-02-25 RX ADMIN — PROPOFOL 30 MCG/KG/MIN: 10 INJECTION, EMULSION INTRAVENOUS at 04:08

## 2019-02-25 RX ADMIN — CHLORHEXIDINE GLUCONATE 0.12% ORAL RINSE 15 ML: 1.2 LIQUID ORAL at 08:15

## 2019-02-25 RX ADMIN — NYSTATIN: 100000 POWDER TOPICAL at 17:28

## 2019-02-25 RX ADMIN — SODIUM CHLORIDE 8 MG/HR: 9 INJECTION, SOLUTION INTRAVENOUS at 03:58

## 2019-02-25 RX ADMIN — Medication 20000 ML: at 15:06

## 2019-02-25 RX ADMIN — PANTOPRAZOLE SODIUM 40 MG: 40 INJECTION, POWDER, FOR SOLUTION INTRAVENOUS at 20:18

## 2019-02-25 RX ADMIN — Medication 20000 ML: at 02:05

## 2019-02-25 RX ADMIN — SODIUM CHLORIDE 8 MG/HR: 9 INJECTION, SOLUTION INTRAVENOUS at 14:15

## 2019-02-25 RX ADMIN — METOPROLOL TARTRATE 2.5 MG: 1 INJECTION, SOLUTION INTRAVENOUS at 03:18

## 2019-02-25 RX ADMIN — CALCIUM GLUCONATE 1 G: 98 INJECTION, SOLUTION INTRAVENOUS at 16:19

## 2019-02-25 RX ADMIN — METOPROLOL TARTRATE 5 MG: 1 INJECTION, SOLUTION INTRAVENOUS at 18:19

## 2019-02-25 RX ADMIN — SUCRALFATE 1000 MG: 1 SUSPENSION ORAL at 16:22

## 2019-02-25 RX ADMIN — CALCIUM GLUCONATE 1 G: 98 INJECTION, SOLUTION INTRAVENOUS at 20:18

## 2019-02-25 RX ADMIN — METOCLOPRAMIDE 10 MG: 5 INJECTION, SOLUTION INTRAMUSCULAR; INTRAVENOUS at 23:40

## 2019-02-25 RX ADMIN — GUAIFENESIN 600 MG: 600 TABLET, EXTENDED RELEASE ORAL at 08:15

## 2019-02-25 RX ADMIN — ATORVASTATIN CALCIUM 40 MG: 40 TABLET, FILM COATED ORAL at 16:03

## 2019-02-25 RX ADMIN — METOCLOPRAMIDE 10 MG: 5 INJECTION, SOLUTION INTRAMUSCULAR; INTRAVENOUS at 13:44

## 2019-02-25 RX ADMIN — MAGNESIUM SULFATE HEPTAHYDRATE 1 G: 1 INJECTION, SOLUTION INTRAVENOUS at 01:11

## 2019-02-25 RX ADMIN — POTASSIUM CHLORIDE 40 MEQ: 400 INJECTION, SOLUTION INTRAVENOUS at 16:10

## 2019-02-25 RX ADMIN — Medication 20000 ML: at 06:20

## 2019-02-25 RX ADMIN — Medication 20000 ML: at 10:54

## 2019-02-25 RX ADMIN — GUAIFENESIN 600 MG: 600 TABLET, EXTENDED RELEASE ORAL at 20:18

## 2019-02-25 RX ADMIN — POTASSIUM CHLORIDE 40 MEQ: 400 INJECTION, SOLUTION INTRAVENOUS at 07:31

## 2019-02-25 RX ADMIN — SODIUM PHOSPHATE, MONOBASIC, MONOHYDRATE 9 MMOL: 276; 142 INJECTION, SOLUTION INTRAVENOUS at 21:07

## 2019-02-25 RX ADMIN — METOPROLOL TARTRATE 2.5 MG: 1 INJECTION, SOLUTION INTRAVENOUS at 01:22

## 2019-02-25 RX ADMIN — FENTANYL CITRATE 25 MCG: 50 INJECTION, SOLUTION INTRAMUSCULAR; INTRAVENOUS at 11:30

## 2019-02-25 RX ADMIN — FENTANYL CITRATE 50 MCG: 50 INJECTION, SOLUTION INTRAMUSCULAR; INTRAVENOUS at 11:50

## 2019-02-25 RX ADMIN — HYDROCORTISONE: 1 CREAM TOPICAL at 17:28

## 2019-02-25 RX ADMIN — METOPROLOL TARTRATE 25 MG: 25 TABLET, FILM COATED ORAL at 08:15

## 2019-02-25 RX ADMIN — SODIUM PHOSPHATE, MONOBASIC, MONOHYDRATE 9 MMOL: 276; 142 INJECTION, SOLUTION INTRAVENOUS at 08:23

## 2019-02-25 RX ADMIN — METOCLOPRAMIDE 10 MG: 5 INJECTION, SOLUTION INTRAMUSCULAR; INTRAVENOUS at 05:30

## 2019-02-25 RX ADMIN — CEFAZOLIN SODIUM 2000 MG: 10 INJECTION, POWDER, FOR SOLUTION INTRAVENOUS at 10:23

## 2019-02-25 RX ADMIN — CEFAZOLIN SODIUM 2000 MG: 10 INJECTION, POWDER, FOR SOLUTION INTRAVENOUS at 01:06

## 2019-02-25 RX ADMIN — CALCIUM GLUCONATE 1 G: 94 INJECTION, SOLUTION INTRAVENOUS at 00:00

## 2019-02-25 RX ADMIN — CALCIUM GLUCONATE 1 G: 98 INJECTION, SOLUTION INTRAVENOUS at 07:27

## 2019-02-25 RX ADMIN — NYSTATIN 1 APPLICATION: 100000 POWDER TOPICAL at 08:23

## 2019-02-25 RX ADMIN — PROPOFOL 30 MCG/KG/MIN: 10 INJECTION, EMULSION INTRAVENOUS at 01:05

## 2019-02-25 RX ADMIN — PROPOFOL 30 MCG/KG/MIN: 10 INJECTION, EMULSION INTRAVENOUS at 12:32

## 2019-02-25 RX ADMIN — SUCRALFATE 1000 MG: 1 SUSPENSION ORAL at 06:08

## 2019-02-25 RX ADMIN — DEXMEDETOMIDINE 0.4 MCG/KG/HR: 100 INJECTION, SOLUTION, CONCENTRATE INTRAVENOUS at 21:00

## 2019-02-25 RX ADMIN — SODIUM PHOSPHATE, MONOBASIC, MONOHYDRATE 9 MMOL: 276; 142 INJECTION, SOLUTION INTRAVENOUS at 01:42

## 2019-02-25 NOTE — RESPIRATORY THERAPY NOTE
RT Ventilator Management Note  Georges Recio 60 y o  male MRN: [id number]  Unit/Bed#: [facility] 993-33 Encounter: [id number]      Daily Screen       2/24/2019 0846 2/24/2019 1143          Patient safety screen outcome[de-identified]  Failed  Passed      Not Ready for Weaning due to[de-identified]  Underline problem not resolved        Spont breathing trial % for 30 min:    Yes      RSBI:    69              Physical Exam:   Assessment Type: (P) Assess only  General Appearance: (P) Sleeping  Respiratory Pattern: (P) Spontaneous, Assisted  Chest Assessment: (P) Chest expansion symmetrical  Bilateral Breath Sounds: (P) Coarse, Diminished      Resp Comments: (P) Pt remained on cpap/ps  throughout the night without incident

## 2019-02-25 NOTE — NUTRITION
02/25/19 1259   Recommendations/Interventions   Interventions Diet: continued as ordered   Nutrition Recommendations Continue diet as ordered; Other (specify)  (Replete Phos)

## 2019-02-25 NOTE — PROGRESS NOTES
Summary of Events    Drop in serial hgb levels yesterday required blood transfusion x2  Platelets transfused also for plt ct of 86k  No physical manifestations of bleeding  Also, rhythm change last pm to Atrial flutter at rate of 126 bpm steadily, Lopressor 2,5mg IV x2 doses given without rate control  No Amiodarone due to possible allergic reaction (rash)  Metoprolol po ordered q12

## 2019-02-25 NOTE — UTILIZATION REVIEW
Continued Stay Review    Date: 2/25    Vital Signs: /67   Pulse (!) 122   Temp 97 9 °F (36 6 °C)   Resp (!) 23   Ht 5' 9" (1 753 m)   Wt (!) 166 kg (366 lb 2 9 oz)   SpO2 98%   BMI 54 08 kg/m²      02/25/19 1205  97 2 °F (36 2 °C)Abnormal   120 Abnormal   21    112/62  82 mmHg  97 %   02/25/19 1200  97 5 °F (36 4 °C)  120 Abnormal   15    104/58  78 mmHg  97 %   02/25/19 1155  97 5 °F (36 4 °C)  120 Abnormal   12    94/56  72 mmHg  98 %   02/25/19 1150  97 2 °F (36 2 °C)Abnormal   120 Abnormal   27Abnormal     94/56  74 mmHg  94 %   02/25/19 1145  97 2 °F (36 2 °C)Abnormal   122 Abnormal   18               Assessment/Plan:   2/25 Progress notes:  Pt admitted with GI bleed/ Acute respiratory failure with hypoxia/ Acute blood loss anemia/ Hypovolemic shock/ Acute on chronic renal failure/ Acute systolic CHF    Intubated/ Vent    GI bleed secondary to pyloric junction ulcer status post EGD x2  Gastroenterology to evaluate today  Plan to discuss with GI utility of repeat endoscopy EGD and colonoscopy versus IR intervention with ongoing need for transfusion of blood products  Continue PPI infusion       Continue ventilatory support, pending plan with Gastroenterology assess for appropriateness of spontaneous breathing trial      Patient is more tachycardic today  For hemoglobin presently 8 2  Continue to trend patient's hemoglobin  Tachycardia may be related to beta-blocker withdrawal as well  Pending patient's it GI evaluation and need for ongoing patient is received procedure may initiate beta-blocker later this afternoon      CVVH presently being run even     Continue IV Ancef as per Infectious Disease  Completion date 03/10/2019    If patient develops fevers plan to recheck blood cultures        Medications:   Scheduled Meds:   Current Facility-Administered Medications:  atorvastatin 40 mg Oral Daily With Dinner   calcium gluconate 1 G  100 mL IVPB 1 g Intravenous Once   cefazolin 2,000 mg Intravenous Q8H   chlorhexidine 15 mL Swish & Spit Q12H [facility]   guaiFENesin 600 mg Oral Q12H ARACELIS   hydrocortisone  Topical BID   iron sucrose 100 mg Intravenous Once per day on Tue Thu Sat   metoclopramide 10 mg Intravenous Q6H [facility]   midazolam      nystatin  Topical BID   sucralfate 1,000 mg Oral BID AC     Continuous Infusions:   NxStage K 4/Ca 3 20,000 mL Last Rate: 0 mL (02/25/19 0619)   pantoprozole (PROTONIX) infusion (Continuous) 8 mg/hr Last Rate: 8 mg/hr (02/25/19 1415)   propofol 5-50 mcg/kg/min Last Rate: 30 mcg/kg/min (02/25/19 1401)     PRN Meds:   acetaminophen    bisacodyl    fentanyl citrate (PF)    polyvinyl alcohol     Pertinent Labs/Diagnostic Results:   Wbc = 12 51  H/H = 8 2/ 25 6  Phos = 1 9    Age/Sex: 60 y o  male     Discharge Plan: TBD

## 2019-02-25 NOTE — PROGRESS NOTES
Follow up Consultation    Nephrology   Mervat Galloway 60 y o  male MRN: [id number]  Unit/Bed#: [facility] 737-82 Encounter: [id number]      Physician Requesting Consult: Lay Dunne MD  Reason for Consult:  Evaluation and management of acute kidney injury       ASSESSMENT/PLAN:  65-year-old male past medical history of ischemic cardiomyopathy EF of 25% CKD stage 3 admitted with altered mental status and encephalopathy  Patient with GI bleed  Nephrology following for acute kidney injury  1)Acute kidney injury (POA):  - PATRICK most likely secondary to ischemic injury + questionable immune complex GN (MSSA bacteremia) now likely with ATN  - patient needing dialysis firs acute kidney injury  Is on CVVHD was running net even  - clinically patient appears to be hypervolemic and hemodynamically stable  - will start fluid removal at net -50 cc an hour today  Will titrate up to 100 cc an hour in the next 24-48 hours as tolerated  Then may transition over to IHD  - has tunneled dialysis catheter in place  - After review of records it appears that the patient has a baseline Creatinine of 0 9mg/dL  - patient was admitted with a creatinine of 2 63 mg/dL  - peak creatinine during this admission thus far was 7 74 mg/dL on February 13, 2019   - patient's creatinine today is at 1 14 mg/dL while on CVVH  - Avoid nephrotoxins, adjust meds to appropriate GFR   - Acid base and lytes stable   - Await renal recovery  - BMP, magnesium, phosphorus as per protocol with dialysis  - continues CRRT for now  - Place on a renal diet when allowed diet order    - Strict I/O  2)Blood pressure:  - Optimize hemodynamics   - Maintain MAP > 65mmHg  - Avoid BP fluctuations    - currently off of pressors    3)H/H / anemia:  - of low hemoglobin likely secondary to GI bleed  - for EGD and colonoscopy repeat today  - most recent hemoglobin at 8 2 grams/deciliter  - Management as per primary team   - recommend hold IV iron due to infection    4)Volume status:  - Clinically patient appears to be hypervolemic   - start UF at net -50 cc an hour today    5)Hypokalemia:  - Likely due to dialysis  - stable and replacement protocol    6)Hypophosphatemia:  - Likely due to dialysis  - on replacement protocol  - Continue to monitor for now    7) AFib:  - management as per Primary team  - anticoagulation held due to GI bleed  - on metoprolol 25 mg p o  B i d     8) Ischemic cardiomyopathy with EF of 30%:  - Management as per primary team  - increase UF to -50 today    9) acute hypoxic respiratory failure:  - remains on the vent  - possible extubation today  - management as per primary team    10) MSSA bacteremia:  - follow-up with ID  - on Ancef would need antibiotics until March 10, 2019  - significant eosinophilia  Thought to be initially secondary to amiodarone which was stopped patient back on Ancef  Reconsider use of Ancef  Thanks for the consult  Will continue to follow  Please call with questions/ concerns  Plan was discussed with the team in [address] MD, [facility], 2019, 10:19 AM              Objective :   Patient seen and examined in the ICU while on CRRT at 10:21 a m  [facility] Patient remains off of pressors  Only drip is running are Protonix and Propofol  Remains afebrile hemodynamically stable UF in the last 24 hours of 3 8 L  Patient is for EGD and colonoscopy later today  And possible extubation  PHYSICAL EXAM  /67   Pulse (!) 118   Temp (!) 97 2 °F (36 2 °C)   Resp 21   Ht 5' 9" (1 753 m)   Wt (!) 166 kg (366 lb 2 9 oz)   SpO2 100%   BMI 54 08 kg/m²   Temp (24hrs), Av 9 °F (36 6 °C), Min:96 8 °F (36 °C), Max:99 1 °F (37 3 °C)        Intake/Output Summary (Last 24 hours) at 2019 1019  Last data filed at 2019 1000  Gross per 24 hour   Intake 3945 ml   Output 3276 ml   Net 669 ml       I/O last 24 hours:   In: 4524 [I V :1123; Blood:937; NG/GT:928; IV KDUZHXKDS:8187]  Out: 4468 [Emesis/NG output:60; XAPQN:6060; Stool:510]      Current Weight: Weight - Scale: (!) 166 kg (366 lb 2 9 oz)  First Weight: Weight - Scale: (!) 145 kg (319 lb 3 6 oz)  Physical Exam   Constitutional: He appears well-developed and well-nourished  No distress  Obese male   HENT:   Head: Normocephalic and atraumatic  Intubated   Eyes: No scleral icterus  Neck: Neck supple  JVD present  No tracheal deviation present  Cardiovascular: Regular rhythm and normal heart sounds  Exam reveals no friction rub  No murmur heard  Pulmonary/Chest: Effort normal  He has no wheezes  Course breath sounds bilaterally   Abdominal: Soft  He exhibits no mass  Musculoskeletal: He exhibits edema  Plus two edema bilateral upper lower extremities, bilateral heel dressings in place   Skin: Skin is warm  No rash noted  He is not diaphoretic  Psychiatric:   Intubated   Nursing note and vitals reviewed            Review of Systems   Unable to perform ROS: Intubated       Scheduled Meds:  Current Facility-Administered Medications:  acetaminophen 650 mg Oral Q6H PRN Aram Meek PA-C    atorvastatin 40 mg Oral Daily With Fluvannaольга Foss PA-C    bisacodyl 10 mg Rectal Daily PRN Aram Meek PA-C    cefazolin 2,000 mg Intravenous Q8H Venita Arguelles PA-C Last Rate: Stopped (02/25/19 0200)   chlorhexidine 15 mL [employer] Q12H [id number] [address]    fentanyl citrate (PF) 50 mcg Intravenous Q2H PRN Marsha Costa PA-C    guaiFENesin 600 mg Oral Q12H [address] Tiljose Meek PA-C    hydrocortisone  Topical BID Aram Meek PA-C    iron sucrose 100 mg Intravenous Once per day on Tue Thu Sat Aram Meek PA-C Last Rate: Stopped (02/23/19 1100)   metoclopramide 10 mg Intravenous Q6H [address] Fern RASHIDA Hannah PA-C    metoprolol tartrate 25 mg Oral BID Cammie Chaudhary MD    NxStage K 4/Ca 3 20,000 mL Dialysis Continuous Marsha Costa PA-C Last Rate: 0 mL (02/25/19 9793)   nystatin  Topical BID Aram Meek PA-C    pantoprozole (PROTONIX) infusion (Continuous) 8 mg/hr Intravenous Continuous Hugo Palm PA-C Last Rate: 8 mg/hr (02/25/19 0358)   polyvinyl alcohol 1 drop Both Eyes Q3H PRN Hugo Palm PA-C    propofol 5-50 mcg/kg/min Intravenous Titrated Jingsarah Yoo PA-C Last Rate: 20 mcg/kg/min (02/25/19 0850)   sucralfate 1,000 mg Oral BID AC Fern R HERBERT Hannah        PRN Meds:   acetaminophen    bisacodyl    fentanyl citrate (PF)    polyvinyl alcohol    Continuous Infusions:  NxStage K 4/Ca 3 20,000 mL Last Rate: 0 mL (02/25/19 0619)   pantoprozole (PROTONIX) infusion (Continuous) 8 mg/hr Last Rate: 8 mg/hr (02/25/19 0358)   propofol 5-50 mcg/kg/min Last Rate: 20 mcg/kg/min (02/25/19 0850)         Invasive Devices:      Invasive Devices     Central Venous Catheter Line            Introducer 02/21/19 Internal jugular Left 3 days          Peripheral Intravenous Line            Peripheral IV 02/21/19 Left Antecubital 3 days    Peripheral IV 02/23/19 Right;Upper Arm 2 days    Peripheral IV 02/25/19 Right Antecubital less than 1 day          Arterial Line            Arterial Line 02/21/19 Radial 3 days          Hemodialysis Catheter            Permanent HD Catheter  6 days          Drain            NG/OG/Enteral Tube 3 days    Rectal Tube With balloon 1 day          Airway            ETT  Cuffed 8 mm 3 days                  LABORATORY:    Results from last 7 days   Lab Units 02/25/19  0530 02/25/19  0050 02/24/19  2343 02/24/19 2006 02/24/19  1833 02/24/19  1224 02/24/19  0559 02/23/19  2343  02/23/19  1839 02/23/19  1838 02/23/19  1223 02/23/19  0550  02/22/19  2342 02/22/19  1823  02/22/19  0637 02/22/19  0137 02/21/19  2243  02/21/19  2120  02/21/19  2040   WBC Thousand/uL 12 51*  --   --   --   --   --  15 57*  --   --   --  15 22*  --  18 90*  --   --   --   --  22 04* 22 28*  --   --  22 95*  --   --    HEMOGLOBIN g/dL 8 2* 8 2* 8 1* 7 5*  --  7 7* 8 3* 7 7*   < >  --  6 8* 6 5* 7 1*  --  7 0* 8 0*   < > 8 1* 7 0*  -- < > 6 4*  --   --    I STAT HEMOGLOBIN g/dl  --   --   --   --   --   --   --   --   --   --   --   --   --   --   --   --   --   --   --  7 1*  --   --   --  5 8*   HEMATOCRIT % 25 6*  --   --   --   --   --  25 5*  --   --   --  20 5* 20 1* 21 2*  --  20 5* 23 3*   < > 24 9* 21 7*  --    < > 20 3*  --   --    HEMATOCRIT, ISTAT %  --   --   --   --   --   --   --   --   --   --   --   --   --   --   --   --   --   --   --  21*  --   --   --  17*   PLATELETS Thousands/uL 92*  --   --   --   --   --  86*  --   --   --  97*  --  82*  --   --   --   --  121* 150  --   --  188  --   --    POTASSIUM mmol/L 3 6  --  4 0  --  3 9 3 6 4 1 3 8  --  3 8  --  4 1 3 9  3 9   < >  --  3 9   < > 4 0 3 9  --   --  4 1   < >  --    CHLORIDE mmol/L 107  --  109*  --  108 108 108 107  --  107  --  108 106  107   < >  --  104   < > 100 100  --   --  100   < >  --    CO2 mmol/L 25  --  26  --  25 25 24 23  --  24  --  23 22  23   < >  --  20*   < > 20* 23  --   --  25   < >  --    CO2, I-STAT mmol/L  --   --   --   --   --   --   --   --   --   --   --   --   --   --   --   --   --   --   --  26  --   --   --  25   BUN mg/dL 13  --  17  --  20 25 28* 32*  --  35*  --  40* 46*  48*   < >  --  74*   < > 82* 77*  --   --  74*   < >  --    CREATININE mg/dL 1 14  --  1 38*  --  1 66* 1 94* 2 10* 2 12*  --  2 29*  --  2 57* 2 94*  2 88*   < >  --  4 25*   < > 4 96* 4 93*  --   --  5 16*   < >  --    CALCIUM mg/dL 8 0*  --  7 8*  --  8 0* 7 9* 8 1* 8 0*  --  7 9*  --  7 7* 7 7*  8 0*   < >  --  7 6*   < > 6 7* 7 0*  --   --  7 3*   < >  --    MAGNESIUM mg/dL 2 0  --  1 9  --  2 0 1 9 2 1 2 1  --  1 9  --  2 1 2 1  2 1   < >  --  2 5   < > 2 5 2 4  --   --  2 6   < >  --    PHOSPHORUS mg/dL 1 9*  --  1 9*  --  2 2* 2 5* 2 4* 2 2*  --  2 5*  --  2 1* 2 2*  2 0*   < >  --  2 6*   < > 3 9 3 7  --   --  3 9   < >  --    GLUCOSE, ISTAT mg/dl  --   --   --   --   --   --   --   --   --   --   --   --   --   --   --   --   --   --   --  117 --   --   --  115    < > = values in this interval not displayed  rest all reviewed    RADIOLOGY:  XR chest portable   Final Result by Kathrine Cr MD (02/22 1974)      Endotracheal tube in appropriate position  Cardiomegaly with stable mild pulmonary vascular congestion  Workstation performed: [id number]         CTA abdomen pelvis w wo contrast   Final Result by Mick Rene MD (02/22 0445)      1  Small foci of hyperdensity within the gastric antrum/pylorus area concerning for primary site of active hemorrhage given history of melanoma  In addition, there is a small focus of increased density within the rectum where additional active    hemorrhage is not excluded  2   Extensive distention of the entire colon with liquid stool and gas with loss of haustra within the descending colon and rectum  There are areas of pneumatosis seen within the splenic flexure and sigmoid colon  Correlate clinically for C  difficile    colitis, findings may be seen in the setting of toxic megacolon  3   Retroperitoneal stranding along the inferior iliopsoas muscle on the left, correlate for recent instrumentation  Underlying vasculature remains patent  I personally discussed the location of bleeding with Dr Regi Carlisle on 2/22/2019 at 4:35 AM    I personally discussed the possibility for C  difficile colitis and pneumatosis with [employer] Moctezuma on 2/22/2019 at 4:43 AM                   Workstation performed: [id number]         XR chest portable   Final Result by Arely Fuchs MD (02/22 0850)   Tubes and lines as above without pneumothorax  Mild central congestion and left trace basilar effusion  Workstation performed: [id number]         US abdomen limited   Final Result by Moris Jamison MD (02/19 1813)   No ascites            Workstation performed: [id number]         IR permacath placement   Final Result by Bipin Yi MD (02/18 1150)   Impression:    Successful placement of tunneled hemodialysis catheter through right IJV access  Workstation performed: [id number]         VAS carotid complete study   Final Result by Delpha Romberg Doctor, MD (02/14 1446)      CT head wo contrast   Final Result by Екатерина Tavera MD (02/10 1534)   Interval development of subtle areas of decreased attenuation along the gray matter white matter interface in the bilateral parieto-occipital regions  Differential considerations include infectious etiologies such as abscess or septic    emboli, metastatic disease or ischemia  No intracranial hemorrhage or significant mass effect  Further characterization with brain MRI should be considered  The study was marked in [facility] for immediate notification  Workstation performed: [id number]         XR chest portable   Final Result by Cindy Cuevas DO (02/08 4208)      Moderate pulmonary vascular congestion with small left pleural effusion  Stable cardiomegaly  No pneumothorax  Workstation performed: [id number]         XR chest portable   Final Result by Ayleen Hamilton DO (02/05 0809)      Stable pulmonary edema with left basilar subsegmental atelectasis and possible effusion  Workstation performed: [id number]         XR chest portable   Final Result by Andie Bates MD (02/04 1637)      Stable congestion likely on the basis of heart failure with left basilar opacity, likely atelectasis and pleural fluid  Workstation performed: [id number]         IR permacath placement   Final Result by Daryn Spain MD (02/04 1559)   Impression:    1  Successful image guided placement of right internal jugular tunneled central venous hemodialysis catheter   2  Successful image guided exchange of left internal jugular central venous catheter         Workstation performed: [id number]         XR chest portable   Final Result by Doron Mendieta DO (01/30 0536)   Slight progression of congestive heart failure        Fluid and/or infiltrate left lower lobe  Workstation performed: [id number]         XR chest portable   Final Result by Frances Nieves MD (01/27 1056)      Cardiomegaly with stable vascular congestion and mildly increased left basilar opacity, likely atelectasis/pleural effusion  Workstation performed: [id number]         XR chest portable   Final Result by Glory Beard MD (01/25 9701)      Expected positioning of endotracheal and enteric tubes  A right internal jugular central venous catheter has been changed since previous examination with a new one being larger in caliber  No pneumothorax  Workstation performed: [id number]         IR central line placement    (Results Pending)     Rest all reviewed    Portions of the record may have been created with voice recognition software  Occasional wrong word or "sound a like" substitutions may have occurred due to the inherent limitations of voice recognition software  Read the chart carefully and recognize, using context, where substitutions have occurred  If you have any questions, please contact the dictating provider

## 2019-02-25 NOTE — PLAN OF CARE
Problem: Nutrition/Hydration-ADULT  Goal: Nutrient/Hydration intake appropriate for improving, restoring or maintaining nutritional needs  Description  Monitor and assess patient's nutrition/hydration status for malnutrition (ex- brittle hair, bruises, dry skin, pale skin and conjunctiva, muscle wasting, smooth red tongue, and disorientation)  Collaborate with interdisciplinary team and initiate plan and interventions as ordered  Monitor patient's weight and dietary intake as ordered or per policy  Utilize nutrition screening tool and intervene per policy  Determine patient's food preferences and provide high-protein, high-caloric foods as appropriate       INTERVENTIONS:  - Monitor oral intake, urinary output, labs, and treatment plans  - Assess nutrition and hydration status and recommend course of action  - Evaluate amount of meals eaten  - Assist patient with eating if necessary   - Allow adequate time for meals  - Recommend/ encourage appropriate diets, oral nutritional supplements, and vitamin/mineral supplements  - Order, calculate, and assess calorie counts as needed  - Recommend, monitor, and adjust tube feedings and TPN/PPN based on assessed needs  - Assess need for intravenous fluids  - Provide specific nutrition/hydration education as appropriate  - Include patient/family/caregiver in decisions related to nutrition   Outcome: Not Progressing   Pt NPO for procedure

## 2019-02-25 NOTE — PROGRESS NOTES
Progress Note - Cardiology   Caleb Street 60 y o  male MRN: [id number]  Encounter: [id number]  02/25/19  10:36 AM        Assessment/Plan:    1  Paroxysmal afib/flutter  Started back on Metoprolol tartrate 25 bid this AM, had been up to 100 bid prior to GI bleed  Anticoagulation (warfarin) held due to recent GI bleeding  HR currently controlled, in aflutter, titrate up beta blocker as able  2  Stress cardiomyopathy when originally admitted in setting of bacteremia, resolved by echo 2/19/19 showing EF 55%  Currently low dose beta blocker resumed  3  PATRICK requiring CVVH/ESRD  Currently on CVVH, nephrology now planning to start volume removal (previously was running net even)  Has significant net body overload  4  LBBB  Chronic, stable  5  BioAVR  No vegetation by CHON  On IV Kefzol for MSSA bacteremia  6  HTN  Currently hypotensive  7  MSSA bacteremia  On Kefzol 2 g tid  Follows with Dr Tito Torres as outpt  Subjective/Objective   Chief Complaint: No chief complaint on file  Subjective: 60 y o  with a history of AS s/p bioAVR, HTN, HL, LBBB, originally admitted 1/24/19 to [facility] with chest pain and SOB, found to be in SVT by EMS, on initial assessment also found to be septic requiring pressors, as well as acute hypoxic respiratory failure requiring intubation  He was found to have MSSA bacteremia, was transferred to [facility] for further management  Echo showed decline in LV function to 30%, from prior 50%  He was treated with milrinone as well as pressors as it was felt he had mixed cardiogenic/septic shock  He required CVVH for PATRICK  CHON showed no vegetation  He also developed new afib/flutter which was treated with amiodarone  Milrinone was eventually able to be weaned off, as were pressors  Metoprolol was started in early 2/19  He was able to be extubated 2/6/19  Metoprolol was titrated up as tolerated for rate control  Repeat echo 2/19/19 showed EF normalized to 55%   He developed a diffuse body rash 2/20/19, so amiodarone was stopped due to concern that it was causing rash  He had issues with hypotension 2/21 requiring shorter dialysis session, then developed significant GI bleeding overnight 2/21-2/22, EGD showed large antral ulcer which was treated with clipping/injection  In setting of GI bleed required reintubation and pressor support, pressors were able to be stopped once bleeding resolved  Remains intubated, about to undergo repeat EGD  No significant fevers       Patient Active Problem List   Diagnosis    Aortic stenosis, mild    Essential hypertension    Hyperlipidemia    Left bundle branch block    Murmur    Osteoarthritis of both knees    Pericardial disease    Sciatica    Age-related cataract of right eye    Venous insufficiency    Bilateral leg edema    Stress-induced cardiomyopathy    Acute respiratory failure with hypoxia ([facility] )    History of aortic valve replacement with bioprosthetic valve    At[facility] fibrillation [facility])    Staphylococcus aureus bacteremia    Thrombocytopenia ([facility])    Acute blood loss anemia    Leukocytosis    Cerebrovascular accident ([facility]    Acute systolic CHF (congestive heart failure) ([facility])    Toxic metabolic encephalopathy    Skin rash    Acute on chronic renal failure ([facility])    Hypovolemic shock ([facility])    GI bleed    Coagulopathy ([facility] )    GI bleeding     Past Medical History:   Diagnosis Date    Allergic rhinitis     last assessed 9/12/12    Finger fracture, right     Closed fx of the middle phalanx of the right 5th finger  last assessed 1/30/14    Hemorrhoids     last assessed 2/10/14    Hyperlipidemia     Hypertension        Allergies   Allergen Reactions    Amiodarone      Developed Rash    Penicillins Rash     However, has subsequently tolerated Cefazolin and Cefepime       Current Facility-Administered Medications   Medication Dose Route Frequency Provider Last Rate Last Dose    acetaminophen (TYLENOL) tablet 650 mg 650 mg Oral Q6H PRN Verónica MarchHERBERT   650 mg at 02/20/19 2007    atorvastatin (LIPITOR) tablet 40 mg  40 mg Oral Daily With HERBERT Ventura   40 mg at 02/24/19 1645    bisacodyl (DULCOLAX) rectal suppository 10 mg  10 mg Rectal Daily PRN [facility] MarchHERBERT        ceFAZolin (ANCEF) 2,000 mg in sodium chloride 0 9 % 50 mL IVPB  2,000 mg Intravenous Q8H Kendal Varghese PA-C 100 mL/hr at 02/25/19 1023 2,000 mg at 02/25/19 1023    chlorhexidine (PERIDEX) 0 12 % oral rinse 15 mL  15 mL [place]   15 mL at 02/25/19 0815    fentanyl citrate (PF) 100 MCG/2ML 50 mcg  50 mcg Intravenous Q2H PRN Guicho Lutz PA-C   50 mcg at 02/23/19 2048    guaiFENesin (MUCINEX) 12 hr tablet 600 mg  600 mg Oral [facility] MarchHERBERT   600 mg at 02/25/19 0815    hydrocortisone 1 % cream   Topical BID Verónica MarchHERBERT   1 application at 59/58/88 7148    iron sucrose (VENOFER) 100 mg in sodium chloride 0 9 % 100 mL IVPB  100 mg Intravenous Once per day on Tue Thu Sat Verónica MarchHERBERT   Stopped at 02/23/19 1100    metoclopramide (REGLAN) injection 10 mg  10 mg Intravenous Q6H [facility] Fern Hannah PA-C   10 mg at 02/25/19 0530    metoprolol tartrate (LOPRESSOR) tablet 25 mg  25 mg Oral BID Olga Watson MD   25 mg at 02/25/19 0815    NxStage K 4/Ca 3 dialysis solution (RFP-401) 20,000 mL  20,000 mL Dialysis Continuous Guicho Lutz PA-C 0 mL/hr at 02/25/19 0619 20,000 mL at 02/25/19 6639    nystatin (MYCOSTATIN) powder   Topical BID [facility] MarchHERBERT   1 application at 94/55/38 0836    pantoprazole (PROTONIX) 80 mg in sodium chloride 0 9 % 100 mL infusion  8 mg/hr Intravenous Continuous Verónica MarchHERBERT 10 mL/hr at 02/25/19 0358 8 mg/hr at 02/25/19 0358    polyvinyl alcohol (LIQUIFILM TEARS) 1 4 % ophthalmic solution 1 drop  1 drop Both Eyes Q3H PRN Verónica MarchHERBERT   1 drop at 02/13/19 1724    propofol (DIPRIVAN) 1000 mg in 100 mL infusion (premix)  5-50 mcg/kg/min Intravenous Titrated Beau HERBERT Noel 19 9 mL/hr at 02/25/19 0850 20 mcg/kg/min at 02/25/19 0850    sucralfate (CARAFATE) oral suspension 1,000 mg  1,000 mg Oral BID AC Fern R HERBERT Hannah   1,000 mg at 02/25/19 7852       Vitals: /67   Pulse 78   Temp 97 5 °F (36 4 °C)   Resp (!) 25   Ht 5' 9" (1 753 m)   Wt (!) 166 kg (366 lb 2 9 oz)   SpO2 100%   BMI 54 08 kg/m²     Intake/Output Summary (Last 24 hours) at 2/25/2019 1036  Last data filed at 2/25/2019 1000  Gross per 24 hour   Intake 3975 ml   Output 3276 ml   Net 699 ml     Wt Readings from Last 3 Encounters:   02/25/19 (!) 166 kg (366 lb 2 9 oz)   01/24/19 (!) 154 kg (339 lb 8 1 oz)   06/11/18 (!) 155 kg (341 lb)       Body mass index is 54 08 kg/m²  ,     Vitals:    02/25/19 0800 02/25/19 0815 02/25/19 0900 02/25/19 1000   BP:  126/67     Pulse: (!) 120 (!) 120 (!) 118 78   Patient Position - Orthostatic VS:           Physical Exam:     GEN: intubated, sedated, in no acute distress  HEENT: Sclera anicteric, conjunctivae pink, mucous membranes moist   NECK: Supple, no carotid bruits, no significant JVD  HEART: Irregularly irregular rhythm, HR controlled, II/VI systolic murmur, no clicks, gallops or rubs  LUNGS: Clear to auscultation bilaterally; no wheezes, rales, or rhonchi   ABDOMEN: Soft, nontender, nondistended, normoactive bowel sounds  EXTREMITIES: Skin warm and well perfused, no clubbing, cyanosis, positive for edema  NEURO: No focal findings  SKIN: Normal without suspicious lesions on exposed skin        Lab Results:     BMP:  Results from last 7 days   Lab Units 02/25/19  0530 02/24/19  2343 02/24/19  1833 02/24/19  1224 02/24/19  0559 02/23/19  2343 02/23/19  1839  02/21/19  2243   POTASSIUM mmol/L 3 6 4 0 3 9 3 6 4 1 3 8 3 8   < >  --    CHLORIDE mmol/L 107 109* 108 108 108 107 107   < >  --    CO2 mmol/L 25 26 25 25 24 23 24   < >  --    CO2, I-STAT mmol/L  --   --   --   --   --   --   --   --  26 BUN mg/dL 13 17 20 25 28* 32* 35*   < >  --    CREATININE mg/dL 1 14 1 38* 1 66* 1 94* 2 10* 2 12* 2 29*   < >  --    GLUCOSE, ISTAT mg/dl  --   --   --   --   --   --   --   --  117   CALCIUM mg/dL 8 0* 7 8* 8 0* 7 9* 8 1* 8 0* 7 9*   < >  --     < > = values in this interval not displayed         CBC:   Results from last 7 days   Lab Units 02/25/19  0530 02/25/19  0050 02/24/19  2343 02/24/19 2006 02/24/19  1224 02/24/19  0559 02/23/19  2343  02/23/19  1838 02/23/19  1223 02/23/19  0550 02/22/19  2342 02/22/19  1823  02/22/19  0637 02/22/19  0137  02/21/19  2120   WBC Thousand/uL 12 51*  --   --   --   --  15 57*  --   --  15 22*  --  18 90*  --   --   --  22 04* 22 28*  --  22 95*   HEMOGLOBIN g/dL 8 2* 8 2* 8 1* 7 5* 7 7* 8 3* 7 7*   < > 6 8* 6 5* 7 1* 7 0* 8 0*   < > 8 1* 7 0*   < > 6 4*   I STAT HEMOGLOBIN   --   --   --   --   --   --   --   --   --   --   --   --   --   --   --   --    < >  --    HEMATOCRIT % 25 6*  --   --   --   --  25 5*  --   --  20 5* 20 1* 21 2* 20 5* 23 3*   < > 24 9* 21 7*   < > 20 3*   HEMATOCRIT, ISTAT   --   --   --   --   --   --   --   --   --   --   --   --   --   --   --   --    < >  --    MCV fL 92  --   --   --   --  91  --   --  90  --  88  --   --   --  90 88  --  90   PLATELETS Thousands/uL 92*  --   --   --   --  86*  --   --  97*  --  82*  --   --   --  121* 150  --  188   MCH pg 29 6  --   --   --   --  29 5  --   --  29 7  --  29 5  --   --   --  29 2 28 5  --  28 3   MCHC g/dL 32 0  --   --   --   --  32 5  --   --  33 2  --  33 5  --   --   --  32 5 32 3  --  31 5   RDW % 16 4*  --   --   --   --  16 3*  --   --  15 9*  --  15 6*  --   --   --  15 8* 15 2*  --  16 5*   MPV fL 10 6  --   --   --   --  10 9  --   --  10 2  --  10 7  --   --   --  10 9 10 1  --  10 4   NRBC AUTO /100 WBCs 0  --   --   --   --  0  --   --   --   --   --   --   --   --  1  --   --   --    NRBC /100 WBC  --   --   --   --   --   --   --   --   --   --   --   --   --   --  1  --   -- --     < > = values in this interval not displayed  Results from last 7 days   Lab Units 02/25/19  0530 02/24/19  2343 02/24/19  1833   MAGNESIUM mg/dL 2 0 1 9 2 0       INR:   Results from last 7 days   Lab Units 02/24/19  0559 02/23/19  1839 02/23/19  0826 02/22/19  0637 02/22/19  0137 02/21/19  2120 02/21/19  0511   INR  1 26* 1 26* 1 27* 1 36* 1 48* 2 65* 1 89*       Lipid Profile:   Lab Results   Component Value Date    CHOL 146 07/18/2014    CHOL 145 02/21/2014     Lab Results   Component Value Date    HDL 44 02/13/2019    HDL 41 06/02/2018    HDL 52 06/27/2017     Lab Results   Component Value Date    LDLCALC 77 02/13/2019    LDLCALC 57 06/02/2018    LDLCALC 129 (H) 06/27/2017     Lab Results   Component Value Date    TRIG 129 02/13/2019    TRIG 102 06/02/2018    TRIG 71 06/27/2017         Hgb A1c:         Telemetry: personally reviewed, aflutter HR in 70-80s

## 2019-02-25 NOTE — PROGRESS NOTES
Critical Care Interval Progress Note     Pauline Dos Santos 60 y o  male MRN: [id number]    Unit/Bed#: [facility] 518-01 Encounter: [id number]    Impression:  Principal Problem:    GI bleed  Active Problems:    Acute respiratory failure with hypoxia (HCC)    Acute blood loss anemia    Hypovolemic shock (HCC)    Staphylococcus aureus bacteremia    Thrombocytopenia (HCC)    Stress-induced cardiomyopathy    History of aortic valve replacement with bioprosthetic valve    Atrial fibrillation (HCC)    Leukocytosis    Cerebrovascular accident ([address] )    Acute systolic CHF (congestive heart failure) (HCC)    Toxic metabolic encephalopathy    Skin rash    Acute on chronic renal failure (HCC)    Coagulopathy (HCC)  Resolved Problems:    Acute encephalopathy    NSTEMI (non-ST elevated myocardial infarction) (HCC)    ESRD (end stage renal disease) (HCC)    Rhabdomyolysis    Cardiogenic shock (HCC)    Septic shock (HCC)    Transaminitis    Hypervolemia    Hyponatremia    Acute hypoxic respiratory failure  GI bleed secondary to pyloric junction ulcer status post EGD x2  Max shock  Acute blood loss anemia  Thrombocytopenia secondary to blood loss  Coagulopathy secondary blood loss  AFib/flutter  NSTEMI type 2 ejection fraction 55%  End-stage renal disease/Acute kidney injury now on renal replacement therapy was on HD prior to acute illness  MSSA bacteremia with history of bioprosthetic AVR no vegetations appreciated on CHON  Rash-possibly medication reaction does not appear to be worsening    Total transfusion count since 02/21/2019  RBCs 16  FFP 6  Platelets 2    Plan:    Gastroenterology to evaluate today  Plan to discuss with GI utility of repeat endoscopy EGD and colonoscopy versus IR intervention with ongoing need for transfusion of blood products  Continue PPI infusion  Continue ventilatory support, pending plan with Gastroenterology assess for appropriateness of spontaneous breathing trial     Patient is more tachycardic today  For hemoglobin presently 8 2  Continue to trend patient's hemoglobin  Tachycardia may be related to beta-blocker withdrawal as well  Pending patient's it GI evaluation and need for ongoing patient is received procedure may initiate beta-blocker later this afternoon  CVVH presently being run even    Continue IV Ancef as per Infectious Disease  Completion date 03/10/2019  If patient develops fevers plan to recheck blood cultures  Counseling / Coordination of Care: Total Critical Care time spent 42 minutes excluding procedures, teaching and family updates  ______________________________________________________________________    Chief Complaint:  GI bleed    Recent Events / Nursing Concern:  Patient had EGD performed which identified ulcer at pyloric junction  Patient had repeat EGD on Saturday he did not appear to be acute bleeding despite ongoing need for blood transfusion  Patient was supposed to have colonoscopy performed yesterday which was not completed      Vitals:   Vitals:    19 0500 19 0503 19 0600 19 0700   BP:       Pulse: (!) 120  (!) 120 86   Resp: 14  18 (!) 23   Temp: (!) 96 8 °F (36 °C)  (!) 96 8 °F (36 °C) (!) 96 8 °F (36 °C)   TempSrc: Oral  Oral Oral   SpO2: 100% 100% 93% 99%   Weight:   (!) 166 kg (366 lb 2 9 oz)    Height:         Arterial Line BP: 132/66  Arterial Line MAP (mmHg): 84 mmHg    Temperature: Temp (24hrs), Av °F (36 7 °C), Min:96 8 °F (36 °C), Max:99 1 °F (37 3 °C)  Current: Temperature: (!) 96 8 °F (36 °C)    Hemodynamic Monitoring:  CVP: CVP (mean): 21 mmHg       Respiratory:  SpO2: SpO2: 99 %, SpO2 Activity: SpO2 Activity: At Rest, SpO2 Device: O2 Device: Other (comment)  O2 Flow Rate (L/min): 2 L/min    Respiratory    Lab Data (Last 4 hours)    None         O2/Vent Data (Last 4 hours)       0503           Vent Mode CPAP/PS Spont       FIO2 (%) (%) 40       PEEP (cmH2O) (cmH2O) 5       Pressure Support (cmH2O) (cmH20) 5       MV (Obs) 9 8       RSBI 60                   Physical Exam:  Physical Exam   Constitutional: He is oriented to person, place, and time  He appears well-developed and well-nourished  HENT:   Head: Normocephalic and atraumatic  Mouth/Throat: Oropharynx is clear and moist    Eyes: Pupils are equal, round, and reactive to light  EOM are normal    Neck: Neck supple  No JVD present  No tracheal deviation present  Cardiovascular: Regular rhythm, normal heart sounds and intact distal pulses  Tachycardia   Pulmonary/Chest: Effort normal  No respiratory distress  Coarse breath sounds bilateral   Abdominal: Soft  He exhibits distension  Rectal tube with black liquid stool   Musculoskeletal: He exhibits edema  He exhibits no deformity  Neurological: He is alert and oriented to person, place, and time  Skin: Skin is warm and dry  No rash noted  Allergies:    Allergies   Allergen Reactions    Penicillins Rash     However, has subsequently tolerated Cefazolin and Cefepime       Medications:   Scheduled Meds:  Current Facility-Administered Medications:  acetaminophen 650 mg Oral Q6H PRN Rocio Gomes PA-C    atorvastatin 40 mg Oral Daily With Mar Foss PA-C    bisacodyl 10 mg Rectal Daily PRN Rocio Gomes PA-C    calcium gluconate 1 G  100 mL IVPB 1 g Intravenous Once Alexis Pepper, DO Last Rate: 1 g (02/25/19 0727)   cefazolin 2,000 mg Intravenous Q8H Mirela Deluna PA-C Last Rate: Stopped (02/25/19 0200)   chlorhexidine 15 mL [employer] [facility] Q12H [id number] [address]    fentanyl citrate (PF) 50 mcg Intravenous Q2H PRN Sinan Ellis PA-C    guaiFENesin 600 mg Oral Q12H [facility] Rocio Gomes PA-C    hydrocortisone  Topical BID Rocio Gomes PA-C    iron sucrose 100 mg Intravenous Once per day on Tue Thu Sat Rocio Gomes PA-C Last Rate: Stopped (02/23/19 1100)   metoclopramide 10 mg Intravenous Q6H [facility] Fern Hannah PA-C    metoprolol tartrate 25 mg Oral BID Franklin De Leon MD    NxStage K 4/Ca 3 20,000 mL Dialysis Continuous Jerrald Grave, DO Last Rate: 0 mL (02/25/19 9862)   nystatin  Topical BID Sushila Henderson PA-C    pantoprozole (PROTONIX) infusion (Continuous) 8 mg/hr Intravenous Continuous Sushila Henderson PA-C Last Rate: 8 mg/hr (02/25/19 0358)   polyvinyl alcohol 1 drop Both Eyes Q3H PRN Sushila Henderson PA-C    potassium chloride 40 mEq Intravenous Once Jerrald Grave, DO Last Rate: 40 mEq (02/25/19 0731)   propofol 5-50 mcg/kg/min Intravenous Titrated Milvia Singh PA-C Last Rate: 20 mcg/kg/min (02/25/19 3496)   sodium phosphate 9 mmol Intravenous Once Jerrald Grave, DO    sucralfate 1,000 mg Oral BID AC Fern R HERBERT Hannah      Continuous Infusions:  NxStage K 4/Ca 3 20,000 mL Last Rate: 0 mL (02/25/19 0619)   pantoprozole (PROTONIX) infusion (Continuous) 8 mg/hr Last Rate: 8 mg/hr (02/25/19 0358)   propofol 5-50 mcg/kg/min Last Rate: 20 mcg/kg/min (02/25/19 0605)     PRN Meds:    acetaminophen 650 mg Q6H PRN   bisacodyl 10 mg Daily PRN   fentanyl citrate (PF) 50 mcg Q2H PRN   polyvinyl alcohol 1 drop Q3H PRN       Labs:   Results from last 7 days   Lab Units 02/25/19  0530 02/25/19  0050 02/24/19  2343 02/24/19 2006 02/24/19  1224 02/24/19  0559 02/23/19  2343  02/23/19  1838 02/23/19  1223 02/23/19  0550 02/22/19  2342 02/22/19  1823  02/22/19  0637 02/22/19  0137  02/21/19  2120  02/18/19  0909   WBC Thousand/uL 12 51*  --   --   --   --  15 57*  --   --  15 22*  --  18 90*  --   --   --  22 04* 22 28*  --  22 95*   < > 10 54*   HEMOGLOBIN g/dL 8 2* 8 2* 8 1* 7 5* 7 7* 8 3* 7 7*   < > 6 8* 6 5* 7 1* 7 0* 8 0*   < > 8 1* 7 0*   < > 6 4*   < > 7 0*   I STAT HEMOGLOBIN   --   --   --   --   --   --   --   --   --   --   --   --   --   --   --   --    < >  --    < >  --    HEMATOCRIT % 25 6*  --   --   --   --  25 5*  --   --  20 5* 20 1* 21 2* 20 5* 23 3*   < > 24 9* 21 7*   < > 20 3*   < > 23 1*   HEMATOCRIT, ISTAT   --   --   --   --   --   --   --   --   --   --   --   --   --   --   -- --    < >  --    < >  --    PLATELETS Thousands/uL 92*  --   --   --   --  86*  --   --  97*  --  82*  --   --   --  121* 150  --  188   < > 238   NEUTROS PCT % 64  --   --   --   --  64  --   --   --   --   --   --   --   --   --   --   --   --   --   --    BANDS PCT %  --   --   --   --   --   --   --   --   --   --   --   --   --   --  2  --   --   --   --   --    MONOS PCT % 7  --   --   --   --  8  --   --   --   --   --   --   --   --   --   --   --   --   --   --    MONO PCT %  --   --   --   --   --   --   --   --   --   --   --   --   --   --  4  --   --   --   --  3*    < > = values in this interval not displayed  Results from last 7 days   Lab Units 02/25/19  0530 02/24/19  2343 02/24/19  1833 02/24/19  1224 02/24/19  0559 02/23/19  2343 02/23/19  1839  02/22/19  0137  02/21/19  2120   SODIUM mmol/L 140 141 141 141 140 140 140   < > 136  --  134*   POTASSIUM mmol/L 3 6 4 0 3 9 3 6 4 1 3 8 3 8   < > 3 9  --  4 1   CHLORIDE mmol/L 107 109* 108 108 108 107 107   < > 100  --  100   CO2 mmol/L 25 26 25 25 24 23 24   < > 23  --  25   CO2, I-STAT   --   --   --   --   --   --   --   --   --    < >  --    ANION GAP mmol/L 8 6 8 8 8 10 9   < > 13  --  9   BUN mg/dL 13 17 20 25 28* 32* 35*   < > 77*  --  74*   CREATININE mg/dL 1 14 1 38* 1 66* 1 94* 2 10* 2 12* 2 29*   < > 4 93*  --  5 16*   CALCIUM mg/dL 8 0* 7 8* 8 0* 7 9* 8 1* 8 0* 7 9*   < > 7 0*  --  7 3*   ALT U/L  --   --   --   --   --   --   --   --  8*  --  <6*   AST U/L  --   --   --   --   --   --   --   --  12  --  12   ALK PHOS U/L  --   --   --   --   --   --   --   --  54  --  57   ALBUMIN g/dL  --   --   --   --   --   --   --   --  1 9*  --  1 9*   TOTAL BILIRUBIN mg/dL  --   --   --   --   --   --   --   --  0 74  --  0 65    < > = values in this interval not displayed       Results from last 7 days   Lab Units 02/25/19  0530 02/24/19  2343 02/24/19  1833 02/24/19  1224 02/24/19  0559 02/23/19  2343 02/23/19  1839   MAGNESIUM mg/dL 2 0 1 9 2 0 1 9 2 1 2 1 1 9   PHOSPHORUS mg/dL 1 9* 1 9* 2 2* 2 5* 2 4* 2 2* 2 5*      Results from last 7 days   Lab Units 02/24/19  0559 02/23/19  1839 02/23/19  0826 02/22/19  0637 02/22/19  0137 02/21/19  2120 02/21/19  0511   INR  1 26* 1 26* 1 27* 1 36* 1 48* 2 65* 1 89*         Results from last 7 days   Lab Units 02/23/19  1839   LACTIC ACID mmol/L 0 7     ABG:  Lab Results   Component Value Date    PHART 7 477 (H) 01/26/2019    NUS1BMQ 29 6 (LL) 01/26/2019    PO2ART 71 7 (L) 01/26/2019    LFU4UOL 21 4 (L) 01/26/2019    BEART -1 1 01/26/2019    SOURCE Line, Arterial 01/26/2019     VBG:  Results from last 7 days   Lab Units 02/22/19  1948   PH IVONNE  7 340   PCO2 IVONEN mm Hg 41 2*   PO2 IVONNE mm Hg 29 5*   HCO3 IVONNE mmol/L 21 7*   BASE EXC IVONNE mmol/L -3 7         Vancomycin Tr   Date Value Ref Range Status   02/21/2019 22 8 (HH) 10 0 - 20 0 ug/mL Final        Diagnostic Imaging / Data: I have personally reviewed pertinent reports  and I have personally reviewed pertinent films in PACS with a Radiologist      No new imaging since 02/22/2019    EKG:  Sinus tachycardia with left bundle branch block  Code Status: Level 1 - Full Code    Portions of the record may have been created with voice recognition software  Occasional wrong word or "sound a like" substitutions may have occurred due to the inherent limitations of voice recognition software  Read the chart carefully and recognize, using context, where substitutions have occurred      SIGNATURE: Marvella Apley, DO  DATE: February 25, 2019  TIME: 8:03 AM

## 2019-02-25 NOTE — OP NOTE
OPERATIVE REPORT  PATIENT NAME: Rell Moran    :  1960  MRN: [id number]  Pt Location: [facility] ROAD SHOW ROOM    SURGERY DATE: 2019    Surgeon(s) and Role:     * Lory Lomax DO - Primary    Preop Diagnosis:  GI bleeding [K92 2]    Post-Op Diagnosis Codes:     * GI bleeding [K92 2]    Procedure(s) (LRB):  ESOPHAGOGASTRODUODENOSCOPY (EGD) (N/A)  COLONOSCOPY (N/A)    Specimen(s):  * No specimens in log *    Estimated Blood Loss:   Minimal    Drains:  NG/OG/Enteral Tube (Active)   Placement Reverification Auscultation 2019  4:00 AM   Site Assessment Clean;Dry; Intact 2019  4:00 AM   Status Suction-low continuous 2019  4:00 AM   Drainage Appearance Bile;Brown 2019  4:00 AM   Intake (mL) 30 mL 2019  9:00 AM   Output (mL) 0 mL 2019  8:00 AM   Number of days: 3       Rectal Tube With balloon (Active)   Rectal Tube Output 0 mL 2019 11:00 AM   Number of days: 1       [REMOVED] NG/OG/Enteral Tube Orogastric 14 Fr Right mouth (Removed)   Placement Reverification Auscultation 2019  4:00 PM   Site Assessment Clean;Dry; Intact 2019  4:00 PM   Status Tube feed infusing 2019  4:00 PM   Drainage Appearance None 2019  7:00 PM   Intake (mL) 30 mL 2019 10:00 PM   Output (mL) 0 mL 2019  7:00 PM   Number of days: 9       [REMOVED] NG/OG/Enteral Tube Orogastric (Removed)   Placement Reverification Auscultation 2019  8:50 AM   Site Assessment Dry; Intact 2019  8:50 AM   Status Clamped 2019  8:50 AM   Drainage Appearance None 2019  3:57 AM   Intake (mL) 100 mL 2019  2:30 PM   Number of days: 5       [REMOVED] Rectal Tube With balloon (Removed)   Rectal Tube Output 0 mL 2019  6:00 AM   Number of days: 2       [REMOVED] Urethral Catheter Latex (Removed)   Amt returned on insertion(mL) 80 mL 2019  5:24 PM   Reasons to continue Urinary Catheter  Accurate I&O assessment in critically ill patients (48 hr  max) 2019  6:05 AM   Goal for Removal No longer needed- Will place order to discontinue 1/26/2019  8:04 AM   Site Assessment Clean;Skin intact 1/26/2019  3:45 AM   Collection Container Standard drainage bag 1/26/2019  3:45 AM   Securement Method Securing device (Describe) 1/26/2019  3:45 AM   Output (mL) 0 mL 1/26/2019  7:00 AM   Number of days: 3       ESOPHAGOGASTRODUODENOSCOPY(EGD) and COLONOSCOPY    SEDATION: Monitored anesthesia care, check anesthesia records    ASA Class: 4    INDICATIONS: GI bleeding    CONSENT:  Informed consent was obtained for the procedure, including sedation after explaining the risks and benefits of the procedure  Risks including but not limited to bleeding, perforation, infection, and missed lesion  PREPARATION:   Telemetry, pulse oximetry, blood pressure were monitored throughout the procedure  Patient was identified by myself both verbally and by visual inspection of ID band  PROCEDURE: EGD    DESCRIPTION:   Patient was placed in the left lateral decubitus position and was sedated with the above medication  The gastroscope was introduced in to the oropharynx and the esophagus was intubated under direct visualization  Scope was passed down the esophagus up to 2nd part of the duodenum  A careful inspection was made as the gastroscope was withdrawn, including a retroflexed view of the stomach; findings and interventions are described below  FINDINGS:    #1  Esophagus- proximal and mid esophagus looked normal except for class B esophagitis    #2  Stomach- fundus, body looked normal, there was a clean based ulcer in the antrum that was the prior source of large volume bleeding but currently appeared clean based without stigmata of recent bleeding,  cold forceps biopsies were taken to rule out H pylori from the antrum and the body, the prior placed clip had fallen off but was seen in the first part of the duodenum    #3   Duodenum- duodenal bulb and 2nd portion of the duodenum looked normal, except for clip freely floating as it had fallen off         PROCEDURE: COLONOSCOPY    DESCRIPTION:   Informed consent was obtained for the procedure, including sedation  Risks of perforation, hemorrhage, adverse drug reaction and aspiration were discussed  The patient was placed in the left lateral decubitus position  Based on the pre-procedure assessment, including review of the patient's medical history, medications, allergies, and review of systems, he had been deemed to be an appropriate candidate for conscious sedation; he was therefore sedated with the medications listed below  The patient was monitored continuously with telemetry, pulse oximetry, blood pressure monitoring, and direct observations  A rectal examination was performed  The colonoscope was inserted into the rectum and advanced under direct vision to the ascending colon  The quality of the colonic preparation was poor  A careful inspection was made as the colonoscope was withdrawn, including a retroflexed view of the rectum; findings and interventions are described below  FINDINGS:    Poor prep, farthest extent reached was the ascending colon  Ischemic colitis involving descending and sigmoid colon, cold forceps biopsies were taken from these areas           COMPLICATIONS:   None; patient tolerated the procedure well  IMPRESSION:   Class B esophagitis  Clean based ulcer in the gastric antrum, clip had fallen off   Ischemic colitis in the sigmoid and descending colon, this was likely the source of recent (over the weekend) bleeding      No active bleeding identified on EGD or colonoscopy    RECOMMENDATIONS:  Supportive ICU care with avoiding hypotension, colon should recover  Will need repeat colonoscopy in 6-8 weeks post discharge as did not get to cecum due to poor prep and to make sure icas well as repeat EGD in 6-8 weeks post discharge to document ulcer healing   Start tube feeding tomorrow but can give PO for meds today   Continue PPI BID for 8 weeks    Follow-up biopsies for h pylori status  Continue to avoid anticoagulation given recent large volume bleeding  Discussed with critical care team      SPECIMENS:  * No specimens in log *    ESTIMATED BLOOD LOSS:  Minimal    SIGNATURE: Mariana Noel MD  DATE: February 25, 2019  TIME: 12:12 PM     Luis Rodriguez DO was present throughout the entire procedure from insertion to complete withdrawal of the scope  I performed all interventions myself or oversaw the fellow       Luis Rodriguez DO

## 2019-02-25 NOTE — PROGRESS NOTES
Pt HR acutely increased to 120's-150's  CCM CECILE Thurman aware and at bedside  EKG obtained and reviewed  New orders to obtain another hgb at 0100

## 2019-02-25 NOTE — PROGRESS NOTES
Progress Note - General Surgery   Lisa Tang 60 y o  male MRN: [id number]  Unit/Bed#: [facility] 518-01 Encounter: [id number]    Assessment:  60 y o  M w/ GI bleeding of unclear etiology suspected UGI 2/2 antral ulcer s/p EGD w/ injection and clipping    Hgb w/ transient response to transfusion  STools grossly nonbloody  HDS without pressor requirement    Plan:  Cont to monitor  Trend Hgb  Appreciate GI recs; ?colonic source 2/2 ischemic colitis, however this is less likely as abd soft and endpoints improved     Subjective/Objective   Chief Complaint:     Subjective:     Objective:     Blood pressure 126/67, pulse (!) 118, temperature (!) 97 2 °F (36 2 °C), resp  rate 21, height 5' 9" (1 753 m), weight (!) 166 kg (366 lb 2 9 oz), SpO2 100 %  ,Body mass index is 54 08 kg/m²        Intake/Output Summary (Last 24 hours) at 2/25/2019 1001  Last data filed at 2/25/2019 0900  Gross per 24 hour   Intake 3655 ml   Output 3053 ml   Net 602 ml       Invasive Devices     Central Venous Catheter Line            Introducer 02/21/19 Internal jugular Left 3 days          Peripheral Intravenous Line            Peripheral IV 02/21/19 Left Antecubital 3 days    Peripheral IV 02/23/19 Right;Upper Arm 2 days    Peripheral IV 02/25/19 Right Antecubital less than 1 day          Arterial Line            Arterial Line 02/21/19 Radial 3 days          Hemodialysis Catheter            Permanent HD Catheter  6 days          Drain            NG/OG/Enteral Tube 3 days    Rectal Tube With balloon 1 day          Airway            ETT  Cuffed 8 mm 3 days                Physical Exam:   Intubated, sedated  ABd soft, NTND  Rectal tube output w/ brown stool    Lab, Imaging and other studies:  CBC:   Lab Results   Component Value Date    WBC 12 51 (H) 02/25/2019    HGB 8 2 (L) 02/25/2019    HCT 25 6 (L) 02/25/2019    MCV 92 02/25/2019    PLT 92 (L) 02/25/2019    MCH 29 6 02/25/2019    MCHC 32 0 02/25/2019    RDW 16 4 (H) 02/25/2019    MPV 10 6 02/25/2019 NRBC 0 02/25/2019   , CMP:   Lab Results   Component Value Date    SODIUM 140 02/25/2019    K 3 6 02/25/2019     02/25/2019    CO2 25 02/25/2019    BUN 13 02/25/2019    CREATININE 1 14 02/25/2019    CALCIUM 8 0 (L) 02/25/2019    EGFR 70 02/25/2019   , Coagulation:   No results found for: PT, INR, APTT

## 2019-02-26 LAB
ANION GAP SERPL CALCULATED.3IONS-SCNC: 3 MMOL/L (ref 4–13)
ANION GAP SERPL CALCULATED.3IONS-SCNC: 5 MMOL/L (ref 4–13)
ANION GAP SERPL CALCULATED.3IONS-SCNC: 6 MMOL/L (ref 4–13)
ANION GAP SERPL CALCULATED.3IONS-SCNC: 7 MMOL/L (ref 4–13)
ANISOCYTOSIS BLD QL SMEAR: PRESENT
ATRIAL RATE: 125 BPM
ATRIAL RATE: 126 BPM
ATRIAL RATE: 134 BPM
BASOPHILS # BLD MANUAL: 0.11 THOUSAND/UL (ref 0–0.1)
BASOPHILS NFR MAR MANUAL: 1 % (ref 0–1)
BUN SERPL-MCNC: 6 MG/DL (ref 5–25)
BUN SERPL-MCNC: 6 MG/DL (ref 5–25)
BUN SERPL-MCNC: 7 MG/DL (ref 5–25)
BUN SERPL-MCNC: 9 MG/DL (ref 5–25)
BURR CELLS BLD QL SMEAR: PRESENT
CA-I BLD-SCNC: 1.16 MMOL/L (ref 1.12–1.32)
CA-I BLD-SCNC: 1.17 MMOL/L (ref 1.12–1.32)
CA-I BLD-SCNC: 1.18 MMOL/L (ref 1.12–1.32)
CA-I BLD-SCNC: 1.19 MMOL/L (ref 1.12–1.32)
CALCIUM SERPL-MCNC: 7.9 MG/DL (ref 8.3–10.1)
CALCIUM SERPL-MCNC: 7.9 MG/DL (ref 8.3–10.1)
CALCIUM SERPL-MCNC: 8.1 MG/DL (ref 8.3–10.1)
CALCIUM SERPL-MCNC: 8.3 MG/DL (ref 8.3–10.1)
CHLORIDE SERPL-SCNC: 107 MMOL/L (ref 100–108)
CHLORIDE SERPL-SCNC: 108 MMOL/L (ref 100–108)
CO2 SERPL-SCNC: 26 MMOL/L (ref 21–32)
CO2 SERPL-SCNC: 27 MMOL/L (ref 21–32)
CREAT SERPL-MCNC: 0.76 MG/DL (ref 0.6–1.3)
CREAT SERPL-MCNC: 0.86 MG/DL (ref 0.6–1.3)
CREAT SERPL-MCNC: 0.88 MG/DL (ref 0.6–1.3)
CREAT SERPL-MCNC: 0.9 MG/DL (ref 0.6–1.3)
EOSINOPHIL # BLD MANUAL: 0.33 THOUSAND/UL (ref 0–0.4)
EOSINOPHIL NFR BLD MANUAL: 3 % (ref 0–6)
ERYTHROCYTE [DISTWIDTH] IN BLOOD BY AUTOMATED COUNT: 16.5 % (ref 11.6–15.1)
GFR SERPL CREATININE-BSD FRML MDRD: 100 ML/MIN/1.73SQ M
GFR SERPL CREATININE-BSD FRML MDRD: 93 ML/MIN/1.73SQ M
GFR SERPL CREATININE-BSD FRML MDRD: 94 ML/MIN/1.73SQ M
GFR SERPL CREATININE-BSD FRML MDRD: 95 ML/MIN/1.73SQ M
GLUCOSE SERPL-MCNC: 75 MG/DL (ref 65–140)
GLUCOSE SERPL-MCNC: 76 MG/DL (ref 65–140)
GLUCOSE SERPL-MCNC: 77 MG/DL (ref 65–140)
GLUCOSE SERPL-MCNC: 78 MG/DL (ref 65–140)
HCT VFR BLD AUTO: 24.3 % (ref 36.5–49.3)
HGB BLD-MCNC: 7.6 G/DL (ref 12–17)
HGB BLD-MCNC: 7.7 G/DL (ref 12–17)
HGB BLD-MCNC: 7.9 G/DL (ref 12–17)
HGB BLD-MCNC: 8.3 G/DL (ref 12–17)
HGB BLD-MCNC: 9 G/DL (ref 12–17)
HYPERCHROMIA BLD QL SMEAR: PRESENT
LYMPHOCYTES # BLD AUTO: 0.11 THOUSAND/UL (ref 0.6–4.47)
LYMPHOCYTES # BLD AUTO: 1 % (ref 14–44)
MAGNESIUM SERPL-MCNC: 1.9 MG/DL (ref 1.6–2.6)
MAGNESIUM SERPL-MCNC: 1.9 MG/DL (ref 1.6–2.6)
MAGNESIUM SERPL-MCNC: 2 MG/DL (ref 1.6–2.6)
MAGNESIUM SERPL-MCNC: 2 MG/DL (ref 1.6–2.6)
MCH RBC QN AUTO: 29.7 PG (ref 26.8–34.3)
MCHC RBC AUTO-ENTMCNC: 31.3 G/DL (ref 31.4–37.4)
MCV RBC AUTO: 95 FL (ref 82–98)
METAMYELOCYTES NFR BLD MANUAL: 3 % (ref 0–1)
MONOCYTES # BLD AUTO: 0.22 THOUSAND/UL (ref 0–1.22)
MONOCYTES NFR BLD: 2 % (ref 4–12)
MYELOCYTES NFR BLD MANUAL: 3 % (ref 0–1)
NEUTROPHILS # BLD MANUAL: 9.57 THOUSAND/UL (ref 1.85–7.62)
NEUTS BAND NFR BLD MANUAL: 1 % (ref 0–8)
NEUTS SEG NFR BLD AUTO: 86 % (ref 43–75)
NRBC BLD AUTO-RTO: 0 /100 WBCS
NRBC BLD AUTO-RTO: 1 /100 WBC (ref 0–2)
PHOSPHATE SERPL-MCNC: 1.6 MG/DL (ref 2.7–4.5)
PHOSPHATE SERPL-MCNC: 1.7 MG/DL (ref 2.7–4.5)
PHOSPHATE SERPL-MCNC: 1.7 MG/DL (ref 2.7–4.5)
PHOSPHATE SERPL-MCNC: 2.3 MG/DL (ref 2.7–4.5)
PLATELET # BLD AUTO: 81 THOUSANDS/UL (ref 149–390)
PLATELET BLD QL SMEAR: ABNORMAL
PMV BLD AUTO: 11.3 FL (ref 8.9–12.7)
POIKILOCYTOSIS BLD QL SMEAR: PRESENT
POLYCHROMASIA BLD QL SMEAR: PRESENT
POTASSIUM SERPL-SCNC: 3.9 MMOL/L (ref 3.5–5.3)
POTASSIUM SERPL-SCNC: 4.4 MMOL/L (ref 3.5–5.3)
QRS AXIS: 127 DEGREES
QRS AXIS: 132 DEGREES
QRS AXIS: 133 DEGREES
QRSD INTERVAL: 154 MS
QRSD INTERVAL: 163 MS
QRSD INTERVAL: 163 MS
QT INTERVAL: 338 MS
QT INTERVAL: 363 MS
QT INTERVAL: 371 MS
QTC INTERVAL: 505 MS
QTC INTERVAL: 524 MS
QTC INTERVAL: 538 MS
RBC # BLD AUTO: 2.56 MILLION/UL (ref 3.88–5.62)
RBC MORPH BLD: PRESENT
SODIUM SERPL-SCNC: 138 MMOL/L (ref 136–145)
SODIUM SERPL-SCNC: 139 MMOL/L (ref 136–145)
SODIUM SERPL-SCNC: 140 MMOL/L (ref 136–145)
SODIUM SERPL-SCNC: 140 MMOL/L (ref 136–145)
T WAVE AXIS: -23 DEGREES
T WAVE AXIS: -39 DEGREES
T WAVE AXIS: -46 DEGREES
VENTRICULAR RATE: 125 BPM
VENTRICULAR RATE: 126 BPM
VENTRICULAR RATE: 134 BPM
WBC # BLD AUTO: 11 THOUSAND/UL (ref 4.31–10.16)

## 2019-02-26 PROCEDURE — 93010 ELECTROCARDIOGRAM REPORT: CPT | Performed by: INTERNAL MEDICINE

## 2019-02-26 PROCEDURE — 90945 DIALYSIS ONE EVALUATION: CPT | Performed by: INTERNAL MEDICINE

## 2019-02-26 PROCEDURE — 94760 N-INVAS EAR/PLS OXIMETRY 1: CPT

## 2019-02-26 PROCEDURE — 99232 SBSQ HOSP IP/OBS MODERATE 35: CPT | Performed by: INTERNAL MEDICINE

## 2019-02-26 PROCEDURE — 85018 HEMOGLOBIN: CPT | Performed by: PHYSICIAN ASSISTANT

## 2019-02-26 PROCEDURE — 80048 BASIC METABOLIC PNL TOTAL CA: CPT | Performed by: INTERNAL MEDICINE

## 2019-02-26 PROCEDURE — 93005 ELECTROCARDIOGRAM TRACING: CPT

## 2019-02-26 PROCEDURE — 99233 SBSQ HOSP IP/OBS HIGH 50: CPT | Performed by: INTERNAL MEDICINE

## 2019-02-26 PROCEDURE — 82330 ASSAY OF CALCIUM: CPT | Performed by: INTERNAL MEDICINE

## 2019-02-26 PROCEDURE — 85007 BL SMEAR W/DIFF WBC COUNT: CPT | Performed by: STUDENT IN AN ORGANIZED HEALTH CARE EDUCATION/TRAINING PROGRAM

## 2019-02-26 PROCEDURE — 99231 SBSQ HOSP IP/OBS SF/LOW 25: CPT | Performed by: INTERNAL MEDICINE

## 2019-02-26 PROCEDURE — C9113 INJ PANTOPRAZOLE SODIUM, VIA: HCPCS | Performed by: PHYSICIAN ASSISTANT

## 2019-02-26 PROCEDURE — 83735 ASSAY OF MAGNESIUM: CPT | Performed by: INTERNAL MEDICINE

## 2019-02-26 PROCEDURE — 94003 VENT MGMT INPAT SUBQ DAY: CPT

## 2019-02-26 PROCEDURE — 85027 COMPLETE CBC AUTOMATED: CPT | Performed by: STUDENT IN AN ORGANIZED HEALTH CARE EDUCATION/TRAINING PROGRAM

## 2019-02-26 PROCEDURE — 90945 DIALYSIS ONE EVALUATION: CPT

## 2019-02-26 PROCEDURE — 84100 ASSAY OF PHOSPHORUS: CPT | Performed by: INTERNAL MEDICINE

## 2019-02-26 RX ORDER — METOPROLOL TARTRATE 5 MG/5ML
5 INJECTION INTRAVENOUS ONCE
Status: COMPLETED | OUTPATIENT
Start: 2019-02-26 | End: 2019-02-26

## 2019-02-26 RX ORDER — MAGNESIUM SULFATE 1 G/100ML
1 INJECTION INTRAVENOUS ONCE
Status: COMPLETED | OUTPATIENT
Start: 2019-02-26 | End: 2019-02-26

## 2019-02-26 RX ORDER — POTASSIUM CHLORIDE 29.8 MG/ML
40 INJECTION INTRAVENOUS ONCE
Status: COMPLETED | OUTPATIENT
Start: 2019-02-26 | End: 2019-02-26

## 2019-02-26 RX ORDER — METOPROLOL TARTRATE 50 MG/1
50 TABLET, FILM COATED ORAL EVERY 12 HOURS SCHEDULED
Status: DISCONTINUED | OUTPATIENT
Start: 2019-02-26 | End: 2019-02-26

## 2019-02-26 RX ORDER — METOPROLOL TARTRATE 5 MG/5ML
10 INJECTION INTRAVENOUS EVERY 4 HOURS
Status: DISCONTINUED | OUTPATIENT
Start: 2019-02-26 | End: 2019-02-27

## 2019-02-26 RX ORDER — METOPROLOL TARTRATE 5 MG/5ML
5 INJECTION INTRAVENOUS EVERY 6 HOURS
Status: DISCONTINUED | OUTPATIENT
Start: 2019-02-26 | End: 2019-02-26

## 2019-02-26 RX ADMIN — Medication 20000 ML: at 20:54

## 2019-02-26 RX ADMIN — CALCIUM GLUCONATE 1 G: 98 INJECTION, SOLUTION INTRAVENOUS at 14:08

## 2019-02-26 RX ADMIN — METOCLOPRAMIDE 10 MG: 5 INJECTION, SOLUTION INTRAMUSCULAR; INTRAVENOUS at 05:09

## 2019-02-26 RX ADMIN — HYDROCORTISONE: 1 CREAM TOPICAL at 18:11

## 2019-02-26 RX ADMIN — POTASSIUM CHLORIDE 40 MEQ: 400 INJECTION, SOLUTION INTRAVENOUS at 13:57

## 2019-02-26 RX ADMIN — NYSTATIN 1 APPLICATION: 100000 POWDER TOPICAL at 08:23

## 2019-02-26 RX ADMIN — GUAIFENESIN 600 MG: 600 TABLET, EXTENDED RELEASE ORAL at 08:20

## 2019-02-26 RX ADMIN — NYSTATIN: 100000 POWDER TOPICAL at 18:11

## 2019-02-26 RX ADMIN — DEXMEDETOMIDINE 0.4 MCG/KG/HR: 100 INJECTION, SOLUTION, CONCENTRATE INTRAVENOUS at 10:47

## 2019-02-26 RX ADMIN — METOPROLOL TARTRATE 10 MG: 5 INJECTION, SOLUTION INTRAVENOUS at 21:51

## 2019-02-26 RX ADMIN — Medication 20000 ML: at 11:29

## 2019-02-26 RX ADMIN — METOPROLOL TARTRATE 5 MG: 5 INJECTION, SOLUTION INTRAVENOUS at 18:08

## 2019-02-26 RX ADMIN — Medication 20000 ML: at 01:58

## 2019-02-26 RX ADMIN — METOPROLOL TARTRATE 50 MG: 50 TABLET ORAL at 08:48

## 2019-02-26 RX ADMIN — DEXMEDETOMIDINE 0.4 MCG/KG/HR: 100 INJECTION, SOLUTION, CONCENTRATE INTRAVENOUS at 05:09

## 2019-02-26 RX ADMIN — METOPROLOL TARTRATE 5 MG: 5 INJECTION, SOLUTION INTRAVENOUS at 19:15

## 2019-02-26 RX ADMIN — DEXMEDETOMIDINE 0.5 MCG/KG/HR: 100 INJECTION, SOLUTION, CONCENTRATE INTRAVENOUS at 00:11

## 2019-02-26 RX ADMIN — SUCRALFATE 1000 MG: 1 SUSPENSION ORAL at 06:09

## 2019-02-26 RX ADMIN — FENTANYL CITRATE 50 MCG: 50 INJECTION, SOLUTION INTRAMUSCULAR; INTRAVENOUS at 08:54

## 2019-02-26 RX ADMIN — PANTOPRAZOLE SODIUM 40 MG: 40 INJECTION, POWDER, FOR SOLUTION INTRAVENOUS at 08:23

## 2019-02-26 RX ADMIN — CHLORHEXIDINE GLUCONATE 0.12% ORAL RINSE 15 ML: 1.2 LIQUID ORAL at 08:20

## 2019-02-26 RX ADMIN — Medication 20000 ML: at 06:37

## 2019-02-26 RX ADMIN — POTASSIUM CHLORIDE 40 MEQ: 400 INJECTION, SOLUTION INTRAVENOUS at 00:59

## 2019-02-26 RX ADMIN — CEFAZOLIN SODIUM 2000 MG: 10 INJECTION, POWDER, FOR SOLUTION INTRAVENOUS at 10:46

## 2019-02-26 RX ADMIN — POTASSIUM CHLORIDE 40 MEQ: 400 INJECTION, SOLUTION INTRAVENOUS at 06:47

## 2019-02-26 RX ADMIN — IRON SUCROSE 100 MG: 20 INJECTION, SOLUTION INTRAVENOUS at 10:46

## 2019-02-26 RX ADMIN — CALCIUM GLUCONATE 1 G: 98 INJECTION, SOLUTION INTRAVENOUS at 06:36

## 2019-02-26 RX ADMIN — CEFAZOLIN SODIUM 2000 MG: 10 INJECTION, POWDER, FOR SOLUTION INTRAVENOUS at 01:12

## 2019-02-26 RX ADMIN — CEFAZOLIN SODIUM 2000 MG: 10 INJECTION, POWDER, FOR SOLUTION INTRAVENOUS at 18:09

## 2019-02-26 RX ADMIN — SODIUM PHOSPHATE, MONOBASIC, MONOHYDRATE 9 MMOL: 276; 142 INJECTION, SOLUTION INTRAVENOUS at 19:59

## 2019-02-26 RX ADMIN — HYDROCORTISONE 1 APPLICATION: 1 CREAM TOPICAL at 08:20

## 2019-02-26 RX ADMIN — SODIUM PHOSPHATE, MONOBASIC, MONOHYDRATE 9 MMOL: 276; 142 INJECTION, SOLUTION INTRAVENOUS at 14:12

## 2019-02-26 RX ADMIN — SODIUM PHOSPHATE, MONOBASIC, MONOHYDRATE 6 MMOL: 276; 142 INJECTION, SOLUTION INTRAVENOUS at 01:50

## 2019-02-26 RX ADMIN — CALCIUM GLUCONATE 1 G: 98 INJECTION, SOLUTION INTRAVENOUS at 19:59

## 2019-02-26 RX ADMIN — PANTOPRAZOLE SODIUM 40 MG: 40 INJECTION, POWDER, FOR SOLUTION INTRAVENOUS at 21:50

## 2019-02-26 RX ADMIN — METOCLOPRAMIDE 10 MG: 5 INJECTION, SOLUTION INTRAMUSCULAR; INTRAVENOUS at 11:31

## 2019-02-26 RX ADMIN — SODIUM PHOSPHATE, MONOBASIC, MONOHYDRATE 9 MMOL: 276; 142 INJECTION, SOLUTION INTRAVENOUS at 08:23

## 2019-02-26 RX ADMIN — MAGNESIUM SULFATE HEPTAHYDRATE 1 G: 1 INJECTION, SOLUTION INTRAVENOUS at 14:08

## 2019-02-26 RX ADMIN — CALCIUM GLUCONATE 1 G: 98 INJECTION, SOLUTION INTRAVENOUS at 01:50

## 2019-02-26 RX ADMIN — MAGNESIUM SULFATE HEPTAHYDRATE 1 G: 1 INJECTION, SOLUTION INTRAVENOUS at 01:50

## 2019-02-26 NOTE — PROGRESS NOTES
Progress Note - Critical Care   Greta Nolan 60 y o  male MRN: [id number]  Unit/Bed#: [facility] 518-01 Encounter: [id number]    Assessment:   Principal Problem:    GI bleed  Active Problems:    Left bundle branch block    Stress-induced cardiomyopathy    Acute respiratory failure with hypoxia (HCC)    History of aortic valve replacement with bioprosthetic valve    Atrial fibrillation (HCC)    Staphylococcus aureus bacteremia    Thrombocytopenia (HCC)    Acute blood loss anemia    Leukocytosis    Cerebrovascular accident ([facility] [address] )    Toxic metabolic encephalopathy    Skin rash    Acute on chronic renal failure (HCC)    Hypovolemic shock (HCC)    Coagulopathy (HCC)    GI bleeding  Resolved Problems:    Acute encephalopathy    NSTEMI (non-ST elevated myocardial infarction) ([facility] [address] )    ESRD (end stage renal disease) ([facility])    Rhabdomyolysis    Cardiogenic shock (HCC)    Septic shock (HCC)    Transaminitis    Hypervolemia    Hyponatremia    Plan:   Neuro:  · Analgesia:fentanyl 50 mcgIV Q 2H Sedation Precedx drip   · Delirium PPX: CAM-ICU,  Regulate sleep/wake cycle  ·   CV:   Paroxysmal Aflutter  · One episode of SVTin the 160's secondary to patient's irritation with ETT, this am  · EKG  · Currently HR controlled in high 60's  · Lopressor increased to 50 mg BID  · Anticoagulation held due to GI bleed  · Continue to monitor rhythm on telemetry    Lung:  Acute hypoxic respiratory failure  · Patient tolerating pressure support, no dyspnea or increased WOB  · Maintaining own TV of 480's and rate of 24 with sats 98%  · Possible extubation today, if HGB stable    GI:   GI bleed secondary to pyloric junction ulcer and ischemic colitis  · Colonoscopy yesterday-ischemic colitis  · GI following  · Continue protonix, carafate, reglan  · Hgb 7 9, continue to trend along with platelets  · Transfuse if HGB< 7 5      FEN:   · Nutrition/diet: Npo   Restart tube feeds if okay with GI  · Replete electrolytes with goals of  K > 4 0 , Mg > 2 0 , Phos >3 0    :   PATRICK on CVVD  · HX of CKD stage 3, baseline creat 0 9  · Nephrology following  · CVVH running at -50cc/hr   · Avoid nephrotoxic meds  · Strict I and O's       ID:   MSSA bacteremia  · ID following  · Initial blood cultures positive for Staph Auerus, repeat BC from 1/27/19 negative  · Continue Ancef 2 grams IV per ID Until 3/10/19  · Normothermic,continue to trend  ·  if elevated temp >100 4 repeat Blood cultures  ·   Heme:  Anemia secondary to GI bleed   · H and H down to 7 9 from 8 3 this am    · Continue to trend Q 6 H  · Transfuse if H < 7 5   Endo:  · Glycemic control plan: Blood glucose controlled on current regime    Msk/Skin:   Rash  · Resolving, suspect likely from reaction to Amiodarone   Turn and reposition Q 2 Hr   Offload pressure points    VTE Prophylaxis:  · Pharmacologic Prophylaxis: Reason for no pharmacologic prophylaxis GI bleed  · Mechanical prophylaxis: sequential compression device    Disposition: Continue ICU care    ______________________________________________________________________  Chief complaint  GI bleed    HPI/24hr events:  Patient hemodynamically stable overnight  Tolerating CVVH overnight  Tolerated pressure support of 5/5 without any WOB or SOB  Had a BM that was greenish-brown overnight, no blood noted  ______________________________________________________________________  Physical Exam:  Sheikh Agitation Sedation Scale (RASS): Light sedation  Physical Exam   Constitutional: He is oriented to person, place, and time  He appears well-developed  No distress  HENT:   Head: Normocephalic and atraumatic  Eyes: Pupils are equal, round, and reactive to light  Neck: Normal range of motion  Neck supple  Cardiovascular: An irregular rhythm present  Pulmonary/Chest: Effort normal and breath sounds normal  No respiratory distress  He has no wheezes  Abdominal: Soft  Bowel sounds are normal  He exhibits no distension  There is no tenderness  Musculoskeletal: He exhibits edema (general + 3-4 edema of upper and lower extremitites bilaterall)  Neurological: He is oriented to person, place, and time  Skin: Skin is warm and dry  Capillary refill takes 2 to 3 seconds  Rash noted  Noted to trunk and arms   Psychiatric: He has a normal mood and affect  His behavior is normal      ______________________________________________________________________  Temperature:   Temp (24hrs), Av 1 °F (36 7 °C), Min:96 4 °F (35 8 °C), Max:99 3 °F (37 4 °C)    Current Temperature: 99 °F (37 2 °C)    Vitals:    19 0840 19 0848 19 0900 19 0930   BP:  99/58     Pulse:  (!) 130 (!) 128 (!) 106   Resp:   (!) 30 (!) 24   Temp:   99 °F (37 2 °C) 99 °F (37 2 °C)   TempSrc:       SpO2: 98%  97% 97%   Weight:       Height:         Arterial Line BP: 100/54       Weights:   IBW: 70 7 kg    Body mass index is 53 56 kg/m²    Weight (last 2 days)     Date/Time   Weight    19 0600   165 (362 66)  (Abnormal)     19 0600   166 (366 18)  (Abnormal)     19 0600   165 (364 86)  (Abnormal)             Height: 5' 9" (175 3 cm)  Hemodynamic Monitoring:  CVP: CVP (mean): 21 mmHg       Ventilator Settings:   Vent Mode: CPAP/PS Spont              FiO2 (%): 40       No results found for: PHART, UIN8DPS, PO2ART, BZV2QXB, D1EDLRYI, BEART, SOURCE    Intake and Outputs:  I/O       701 -  07 -  07 07 07    I V  (mL/kg) 952 (5 7) 783 (4 8) 33 (0 2)    Blood 937      NG/ 220 0    IV Piggyback 1750 1627 100    Total Intake(mL/kg) 4567 (27 5) 2630 (16) 133 (0 8)    Urine (mL/kg/hr) 0 (0) 0 (0) 0 (0)    Emesis/NG output 60 50 0    Other 3516 3282 351    Stool 510 0     Total Output 4086 3332 351    Net +481 -702 -218           Unmeasured Stool Occurrence 3 x 2 x         UOP: 0/hour   Nutrition:        Diet Orders   (From admission, onward)            Start     Ordered    19 0221  Diet NPO  Diet effective now     Question Answer Comment   Diet Type NPO    RD to adjust diet per protocol? Yes        02/22/19 0220          Labs:   Results from last 7 days   Lab Units 02/26/19  0632 02/26/19  0530 02/25/19  2340  02/25/19  0530  02/24/19  0559  02/22/19  0637   WBC Thousand/uL 11 00*  --   --   --  12 51*  --  15 57*   < > 22 04*   HEMOGLOBIN g/dL 7 6* 7 9* 8 3*   < > 8 2*   < > 8 3*   < > 8 1*   HEMATOCRIT % 24 3*  --   --   --  25 6*  --  25 5*   < > 24 9*   PLATELETS Thousands/uL 81*  --   --   --  92*  --  86*   < > 121*   NEUTROS PCT %  --   --   --   --  64  --  64  --   --    MONOS PCT %  --   --   --   --  7  --  8  --   --    MONO PCT %  --   --   --   --   --   --   --   --  4    < > = values in this interval not displayed  Results from last 7 days   Lab Units 02/26/19  0530 02/25/19  2340 02/25/19  1824  02/22/19  0137 02/21/19  2243 02/21/19 2120  02/21/19  2040   POTASSIUM mmol/L 3 9 3 9 4 1   < > 3 9  --  4 1   < >  --    CHLORIDE mmol/L 108 107 107   < > 100  --  100   < >  --    CO2 mmol/L 26 26 25   < > 23  --  25   < >  --    CO2, I-STAT mmol/L  --   --   --   --   --  26  --   --  25   BUN mg/dL 7 9 10   < > 77*  --  74*   < >  --    CREATININE mg/dL 0 88 0 90 1 01   < > 4 93*  --  5 16*   < >  --    CALCIUM mg/dL 8 1* 7 9* 7 9*   < > 7 0*  --  7 3*   < >  --    ALK PHOS U/L  --   --   --   --  54  --  57  --   --    ALT U/L  --   --   --   --  8*  --  <6*  --   --    AST U/L  --   --   --   --  12  --  12  --   --    GLUCOSE, ISTAT mg/dl  --   --   --   --   --  117  --   --  115    < > = values in this interval not displayed       Results from last 7 days   Lab Units 02/26/19  0530 02/25/19  2340 02/25/19  1824   MAGNESIUM mg/dL 2 0 1 9 2 1     Results from last 7 days   Lab Units 02/26/19  0530 02/25/19  2340 02/25/19  1824   PHOSPHORUS mg/dL 1 7* 2 3* 1 9*      Results from last 7 days   Lab Units 02/24/19  0559 02/23/19  1839 02/23/19  0826   INR  1 26* 1 26* 1 27*     Results from last 7 days   Lab Units 02/23/19  1839   LACTIC ACID mmol/L 0 7     0   Lab Value Date/Time    TROPONINI 36 20 (H) 01/24/2019 2137    TROPONINI 36 30 (H) 01/24/2019 1831    TROPONINI 34 90 (H) 01/24/2019 1613    TROPONINI >40 00 (HH) 01/24/2019 0502    TROPONINI >40 00 (HH) 01/24/2019 0207    TROPONINI 36 61 (HH) 01/23/2019 2251    TROPONINI 24 29 (HH) 01/23/2019 1850    TROPONINI 14 20 (HH) 01/23/2019 1600     Imaging:  I have personally reviewed pertinent films in PACS  EKG:   Micro:  Blood Culture:   Lab Results   Component Value Date    BLOODCX No Growth After 5 Days  01/27/2019    BLOODCX No Growth After 5 Days  01/27/2019    BLOODCX Staphylococcus aureus (A) 01/26/2019    BLOODCX Staphylococcus aureus (A) 01/26/2019    BLOODCX Staphylococcus aureus (A) 01/24/2019    BLOODCX Staphylococcus aureus (A) 01/24/2019    BLOODCX Staphylococcus aureus (A) 01/23/2019     Urine Culture:   Lab Results   Component Value Date    URINECX 4358-7962 cfu/ml Gram Positive Cocci (A) 01/23/2019     Sputum Culture: No components found for: SPUTUMCX  Wound Culure: No results found for: WOUNDCULT    Lab Results   Component Value Date    BLOODCX No Growth After 5 Days  01/27/2019    BLOODCX No Growth After 5 Days  01/27/2019    BLOODCX Staphylococcus aureus (A) 01/26/2019    BLOODCX Staphylococcus aureus (A) 01/26/2019    URINECX 2458-4401 cfu/ml Gram Positive Cocci (A) 01/23/2019    SPUTUMCULTUR 1+ Growth of Staphylococcus aureus (A) 01/24/2019     Allergies:    Allergies   Allergen Reactions    Amiodarone      Developed Rash    Penicillins Rash     However, has subsequently tolerated Cefazolin and Cefepime     Medications:   Scheduled Meds:  Current Facility-Administered Medications:  acetaminophen 650 mg Oral Q6H PRN Will HERBERT Jean    atorvastatin 40 mg Oral Daily With Elliot Hair HERBERT Foss    bisacodyl 10 mg Rectal Daily PRN Will HERBERT Jean    cefazolin 2,000 mg Intravenous Q8H Chichi HERBERT Acharya Last Rate: 2,000 mg (02/26/19 0112)   chlorhexidine 15 mL Swish & Spit Q12H [place], [place]    dexmedetomidine 0 1-0 7 mcg/kg/hr Intravenous Titrated Momo Skelton PA-C Last Rate: 0 3 mcg/kg/hr (02/26/19 0855)   fentanyl citrate (PF) 100 mcg Intravenous Q2H PRN Brandi Hannah PA-C    fentanyl citrate (PF) 50 mcg Intravenous Q2H PRN Momo Skelton PA-C    guaiFENesin 600 mg Oral Q12H [facility] Isabelle Romano PA-C    hydrocortisone  Topical BID Isabelle Romano PA-C    iron sucrose 100 mg Intravenous Once per day on Tue Thu Sat Isabelle Romano PA-C Last Rate: Stopped (02/23/19 1100)   metoclopramide 10 mg Intravenous Q6H [facility] Fern Hannah PA-C    metoprolol tartrate 50 mg Oral Q12H [facility] Momo Skelton PA-C    NxStage K 4/Ca 3 20,000 mL Dialysis Continuous Momo Skelton PA-C Last Rate: 0 mL (02/26/19 0636)   nystatin  Topical BID Isabelle Romano PA-C    pantoprazole 40 mg Intravenous Q12H [facility] Brandi Hannah PA-C    polyvinyl alcohol 1 drop Both Eyes Q3H PRN Isabelle Romano PA-C    sodium phosphate 9 mmol Intravenous Once Jude Peña DO Last Rate: 9 mmol (02/26/19 0823)   sucralfate 1,000 mg Oral BID AC Fern Hannah PA-C      Continuous Infusions:  dexmedetomidine 0 1-0 7 mcg/kg/hr Last Rate: 0 3 mcg/kg/hr (02/26/19 0855)   NxStage K 4/Ca 3 20,000 mL Last Rate: 0 mL (02/26/19 0636)     PRN Meds:    acetaminophen 650 mg Q6H PRN   bisacodyl 10 mg Daily PRN   fentanyl citrate (PF) 100 mcg Q2H PRN   fentanyl citrate (PF) 50 mcg Q2H PRN   polyvinyl alcohol 1 drop Q3H PRN     Invasive lines and devices:   Invasive Devices     Central Venous Catheter Line            Introducer 02/21/19 Internal jugular Left 4 days          Peripheral Intravenous Line            Peripheral IV 02/23/19 Right;Upper Arm 3 days    Peripheral IV 02/25/19 Right Antecubital 1 day          Arterial Line            Arterial Line 02/21/19 Radial 4 days          Hemodialysis Catheter            Permanent HD Catheter  7 days          Drain NG/OG/Enteral Tube 4 days          Airway            ETT  Cuffed 8 mm 4 days                   Code Status: Level 1 - Full Code    Portions of the record may have been created with voice recognition software  Occasional wrong word or "sound a like" substitutions may have occurred due to the inherent limitations of voice recognition software  Read the chart carefully and recognize, using context, where substitutions have occurred      [id number] [facility] Yves Render

## 2019-02-26 NOTE — PROGRESS NOTES
NEPHROLOGY PROGRESS NOTE   Kofi Heath 60 y o  male MRN: [id number]  Unit/Bed#: [facility] 247-48 Encounter: [id number]      ASSESSMENT & PLAN:  1  Acute kidney injury suspected secondary to ATN in the setting of septic shock  Initially started on CVVH on , then was transitioned to IHD on  and was kept on a MWF schedule, patient became hemodynamically unstable and was transitioned back to CVVHD on   Currently hemodynamically stable, continue with CVVHD for now until current filter  or clots, then will transition to intermittent hemodialysis in the next 24-48 hours  Continue in the meantime with 4 K/3 calcium bath, continue with -50 cc per hour UF  2  Stress cardiomyopathy in the setting of bacteremia  Resolved by last echo  3  Acute hypoxemic respiratory failure, ventilator management per critical care team   Continue with UF -50 cc/hour  4  GI bleeding secondary to pyloric junction ulcer status post EGD x2   Status post injection and clipping    5  Acute blood loss anemia, monitor H&H and transfusion as needed  6  MSSA bacteremia, ID on board, antibiotics as per ID     7  Access right IJ PermCath in place with good blood flow    Discussed with critical care team PA  SUBJECTIVE:  Patient seen and examined, intubated and awake, off inotropics/pressor support  Patient seen during CRRT, HD catheter with good blood flow  Tolerated CVVHD with negative UF of 50 cc/hour        OBJECTIVE:  Current Weight: Weight - Scale: (!) 165 kg (362 lb 10 5 oz)  Vitals:    19 1000   BP:    Pulse: 76   Resp: 22   Temp: 99 °F (37 2 °C)   SpO2: 98%       Intake/Output Summary (Last 24 hours) at 2019 1042  Last data filed at 2019 1000  Gross per 24 hour   Intake 2466 ml   Output 3444 ml   Net -978 ml     General:  Intubated, awake, in not acute distress  Eyes: conjunctivae pale, anicteric sclerae  ENT:  ET and OG tube in place  Neck: supple, no JVD  Chest:  Coarse breath sounds bilateral, on ventilator  CVS: distinct S1 & S2, normal rate, regular rhythm  Abdomen: soft, non-tender, non-distended, normoactive bowel sounds  Extremities: + edema of both legs  Skin: no rash  Neuro: awake, intubated and awake      Medications:    Current Facility-Administered Medications:     acetaminophen (TYLENOL) tablet 650 mg, 650 mg, Oral, Q6H PRN, Rocio Gomes PA-C, 650 mg at 02/20/19 2007    atorvastatin (LIPITOR) tablet 40 mg, 40 mg, Oral, Daily With Vijaya Ambriz PA-C, 40 mg at 02/25/19 1603    bisacodyl (DULCOLAX) rectal suppository 10 mg, 10 mg, Rectal, Daily PRN, Rocio Gomes PA-C    ceFAZolin (ANCEF) 2,000 mg in sodium chloride 0 9 % 50 mL IVPB, 2,000 mg, Intravenous, Q8H, Mirela Deluna PA-C, Last Rate: 100 mL/hr at 02/26/19 0112, 2,000 mg at 02/26/19 0112    chlorhexidine (PERIDEX) 0 12 % oral rinse 15 mL, 15 mL, Swish & Spit, Q12H [facility], Mirela Deluna PA-C, 15 mL at 02/26/19 0820    dexmedetomidine (PRECEDEX) 200 mcg in sodium chloride 0 9 % 50 mL infusion, 0 1-0 7 mcg/kg/hr, Intravenous, Titrated, Mary Hannah PA-C, Last Rate: 12 5 mL/hr at 02/26/19 0855, 0 3 mcg/kg/hr at 02/26/19 0855    fentanyl citrate (PF) 100 MCG/2ML 100 mcg, 100 mcg, Intravenous, Q2H PRN, Mary Hannah PA-C    fentanyl citrate (PF) 100 MCG/2ML 50 mcg, 50 mcg, Intravenous, Q2H PRN, Mary Hannah PA-C, 50 mcg at 02/26/19 0854    guaiFENesin (MUCINEX) 12 hr tablet 600 mg, 600 mg, Oral, Q12H [facility], Rocio Gomes PA-C, 600 mg at 02/26/19 0820    hydrocortisone 1 % cream, , Topical, BID, Rocio Gomes PA-C, 1 application at 00/18/67 0820    iron sucrose (VENOFER) 100 mg in sodium chloride 0 9 % 100 mL IVPB, 100 mg, Intravenous, Once per day on Tue Thu Sat, Rocio Gomes PA-C, Stopped at 02/23/19 1100    metoclopramide (REGLAN) injection 10 mg, 10 mg, Intravenous, Q6H [facility], Fern Hannah PA-C, 10 mg at 02/26/19 0509    metoprolol tartrate (LOPRESSOR) tablet 50 mg, 50 mg, Oral, Q12H [facility], Helen Ny Hannah PA-C, 50 mg at 02/26/19 0848    NxStage K 4/Ca 3 dialysis solution (RFP-401) 20,000 mL, 20,000 mL, Dialysis, Continuous, Carson Troncoso MD, Last Rate: 0 mL/hr at 02/26/19 0636, 20,000 mL at 02/26/19 1053    nystatin (MYCOSTATIN) powder, , Topical, BID, Joann Lagunas PA-C, 1 application at 44/77/77 9826    pantoprazole (PROTONIX) injection 40 mg, 40 mg, Intravenous, Q12H [facility], Helen Hannah PA-C, 40 mg at 02/26/19 2051    polyvinyl alcohol (LIQUIFILM TEARS) 1 4 % ophthalmic solution 1 drop, 1 drop, Both Eyes, Q3H PRN, Joann Lagunas PA-C, 1 drop at 02/13/19 1724    sucralfate (CARAFATE) oral suspension 1,000 mg, 1,000 mg, Oral, BID AC, Fern Hannah PA-C, 1,000 mg at 02/26/19 6690    Invasive Devices:        Lab Results:   Results from last 7 days   Lab Units 02/26/19  0632 02/26/19  0530 02/25/19  2340 02/25/19  1824  02/25/19  0530  02/24/19  0559  02/22/19  0137 02/21/19  2243  02/21/19  2120  02/21/19  2040   WBC Thousand/uL 11 00*  --   --   --   --  12 51*  --  15 57*   < > 22 28*  --   --  22 95*  --   --    HEMOGLOBIN g/dL 7 6* 7 9* 8 3* 8 6*   < > 8 2*   < > 8 3*   < > 7 0*  --    < > 6 4*  --   --    I STAT HEMOGLOBIN g/dl  --   --   --   --   --   --   --   --   --   --  7 1*  --   --   --  5 8*   HEMATOCRIT % 24 3*  --   --   --   --  25 6*  --  25 5*   < > 21 7*  --    < > 20 3*  --   --    HEMATOCRIT, ISTAT %  --   --   --   --   --   --   --   --   --   --  21*  --   --   --  17*   PLATELETS Thousands/uL 81*  --   --   --   --  92*  --  86*   < > 150  --   --  188  --   --    SODIUM mmol/L  --  140 140 140   < > 140   < > 140   < > 136  --   --  134*   < >  --    POTASSIUM mmol/L  --  3 9 3 9 4 1   < > 3 6   < > 4 1   < > 3 9  --   --  4 1   < >  --    CHLORIDE mmol/L  --  108 107 107   < > 107   < > 108   < > 100  --   --  100   < >  --    CO2 mmol/L  --  26 26 25   < > 25   < > 24   < > 23  --   --  25   < >  --    CO2, I-STAT mmol/L  --   --   --   --   --   -- --   --   --   --  26  --   --   --  25   BUN mg/dL  --  7 9 10   < > 13   < > 28*   < > 77*  --   --  74*   < >  --    CREATININE mg/dL  --  0 88 0 90 1 01   < > 1 14   < > 2 10*   < > 4 93*  --   --  5 16*   < >  --    CALCIUM mg/dL  --  8 1* 7 9* 7 9*   < > 8 0*   < > 8 1*   < > 7 0*  --   --  7 3*   < >  --    MAGNESIUM mg/dL  --  2 0 1 9 2 1   < > 2 0   < > 2 1   < > 2 4  --   --  2 6   < >  --    PHOSPHORUS mg/dL  --  1 7* 2 3* 1 9*   < > 1 9*   < > 2 4*   < > 3 7  --   --  3 9   < >  --    ALK PHOS U/L  --   --   --   --   --   --   --   --   --  54  --   --  57  --   --    ALT U/L  --   --   --   --   --   --   --   --   --  8*  --   --  <6*  --   --    AST U/L  --   --   --   --   --   --   --   --   --  12  --   --  12  --   --    GLUCOSE, ISTAT mg/dl  --   --   --   --   --   --   --   --   --   --  117  --   --   --  115    < > = values in this interval not displayed  Portions of the record may have been created with voice recognition software  Occasional wrong word or "sound a like" substitutions may have occurred due to the inherent limitations of voice recognition software  Read the chart carefully and recognize, using context, where substitutions have occurred  If you have any questions, please contact the dictating provider

## 2019-02-26 NOTE — RESPIRATORY THERAPY NOTE
RT Ventilator Management Note  Deepak Reeves 60 y o  male MRN: [id number]  Unit/Bed#: [facility] 518-01 Encounter: [id number]      Daily Screen       2/24/2019 1143 2/25/2019 1000          Patient safety screen outcome[de-identified]  Passed  Passed      Spont breathing trial % for 30 min:  Yes  Yes continues +5 cms PS      RSBI:  69  60              Physical Exam:   Assessment Type: (P) Assess only      Resp Comments: (P) Pt  doing well on CPAP with no increased WOB or SOB throughout the night

## 2019-02-26 NOTE — PROGRESS NOTES
Progress Note - Infectious Disease   Kofi Heath 60 y o  male MRN: [id number]  Unit/Bed#: [facility] 518-01 Encounter: [id number]      Impression/Plan:  1  MSSA bacteremia   Possibility of endocarditis considered in the setting of bioprosthetic AVR  Levie Smoker no evidence of valvular vegetations   Initial portal of entry may have been related to multiple upper back wounds   CT chest/abdomen/pelvis with no other evidence of acute infection   Possible from pneumonia as sputum culture was positive for MSSA   Bacteremia eventually cleared   Podiatry evaluated patient's feet and no acute issues   Neurology evaluation noted with prior changes in mental status, imaging abnormalities felt to be ischemic and similar compared to prior studies   No plan for MRI   Patient continues to hemodynamically improved after recent GI bleed      -continue IV cefazolin  -prolonged course of IV antibiotics of at least 6 weeks through 3/10  -monitor temperature/WBC  -with send blood cultures if patient spikes fever  -ongoing monitoring in the ICU  -if patient is removed off CRRT please re-dose Ancef at 1 g Q 24 hours in the evenings     2  Rash:  Patient recently developed rash over this past week  It was noted to be flat and pruritic  Unclear etiology   Absolute eosinophil count slowly rising   Unclear if it is any particular medication and would question if this is a delayed response to Ancef   Patient's amiodarone was stopped given concern for his rash   Patient's rash is improving while he has been restarted/rechallenge with Ancef  Suspected was due to amiodarone       -continue to monitor rash  -monitor WBC  -continue to trend fever curve/vitals     3  Acute GI bleed:  Patient had an acute episode of bleeding this past weekend  Evelyn Norris was transferred to the ICU and had emergent endoscopy by GI  He has clinically improved and is off pressors and extubated    He is also noted with ischemic colitis on scope      -ongoing follow-up by GI  -ongoing supportive care as per ICU  -continue antibiotics as above for now  -additional lab monitoring as per ICU       4  History of bioprosthetic AVR   Secondary to degenerative AS   Placed in 2014  Kesha Negrete no evidence of endocarditis   Ongoing follow-up by Cardiology      5  Acute kidney injury   Likely ischemic/septic ATN    Patient remains on hemodialysis   He is status post PermCath placement      -ongoing follow-up by Nephrology  -antibiotics currently dosed with CRRT     6  Shock   This is most likely secondary to GI bleed and hemorrhagic shock   Clinically, this is not septic shock  Patient has improved and is off pressors      -antibiotic plan as in above     7  Leukocytosis:  WBC down trending  Patient continues to improve clinically        -continue to monitor fever curve/vitals  -repeat CBC tomorrow  -continue antibiotics as above     Above plan discussed in detail with patient and nursing  ID consult service will continue to follow  Antibiotics:  Ancef    24 hour events:  Patient had repeat colonoscopy yesterday and was noted to have ischemic colitis  He remains on CVVH  No fevers  White blood cell count 11  No new culture data  Patient noted with intermittent tachycardia    Subjective:  Patient seen earlier this morning at bedside  He was extubated and appeared comfortable  Per nursing staff patient is to stop CRRT today  He has no significant skin breakdown rash is slowly improving  No other acute events were noted  Objective:  Vitals:  Temp:  [96 4 °F (35 8 °C)-99 3 °F (37 4 °C)] 99 °F (37 2 °C)  HR:  [] 76  Resp:  [8-30] 22  BP: ()/(51-58) 99/58  SpO2:  [87 %-100 %] 98 %  Temp (24hrs), Av 1 °F (36 7 °C), Min:96 4 °F (35 8 °C), Max:99 3 °F (37 4 °C)  Current: Temperature: 99 °F (37 2 °C)    Physical Exam:   General Appearance:  Alert, interactive, nontoxic, no acute distress     Throat: Unable to examine oropharynx at this time   Lungs:   Decreased breath sounds throughout anteriorly; no wheezes, rhonchi or rales; respirations unlabored on room air   Heart:  Tachycardic; no murmur, rub or gallop appreciated due to body habitus   Abdomen:   Soft, non-tender, non-distended, positive bowel sounds  Extremities: No clubbing, cyanosis; ongoing anasarca in the upper and lower extremities   Skin: No new rashes or lesions  No new draining wounds noted  Per nursing no significant breakdown of the patient's back  Labs, Imaging, & Other studies:   All pertinent labs and imaging studies were personally reviewed  Results from last 7 days   Lab Units 02/26/19  0632 02/26/19  0530 02/25/19  2340  02/25/19  0530  02/24/19  0559   WBC Thousand/uL 11 00*  --   --   --  12 51*  --  15 57*   HEMOGLOBIN g/dL 7 6* 7 9* 8 3*   < > 8 2*   < > 8 3*   PLATELETS Thousands/uL 81*  --   --   --  92*  --  86*    < > = values in this interval not displayed  Results from last 7 days   Lab Units 02/26/19  0530  02/22/19  0137 02/21/19  2243 02/21/19  2120   POTASSIUM mmol/L 3 9   < > 3 9  --  4 1   CHLORIDE mmol/L 108   < > 100  --  100   CO2 mmol/L 26   < > 23  --  25   CO2, I-STAT mmol/L  --   --   --  26  --    BUN mg/dL 7   < > 77*  --  74*   CREATININE mg/dL 0 88   < > 4 93*  --  5 16*   EGFR ml/min/1 73sq m 94   < > 12  --  11   GLUCOSE, ISTAT mg/dl  --   --   --  117  --    CALCIUM mg/dL 8 1*   < > 7 0*  --  7 3*   AST U/L  --   --  12  --  12   ALT U/L  --   --  8*  --  <6*   ALK PHOS U/L  --   --  54  --  57    < > = values in this interval not displayed

## 2019-02-26 NOTE — PROGRESS NOTES
Progress Note - General Surgery   Veronica Burkett 60 y o  male MRN: [id number]  Unit/Bed#: [facility] 518-01 Encounter: [id number]    Assessment:  60 y o  M w/ GI bleeding of unclear etiology suspected UGI 2/2 antral ulcer s/p EGD w/ injection and clipping    Repeat EGD w/o active bleeding; Cscope w/ poor prep however ischemic colitis seen at distal colon  Abd soft  Hgb grossly stable  Stool nonbloody  WBC improving      Plan:  Cont to monitor  Serial exams  No need for acute surgical intervention at this time    Subjective/Objective   Chief Complaint:     Subjective:     Objective:     Blood pressure 99/58, pulse 68, temperature 98 9 °F (37 2 °C), temperature source Rectal, resp  rate 22, height 5' 9" (1 753 m), weight (!) 165 kg (362 lb 10 5 oz), SpO2 97 %  ,Body mass index is 53 56 kg/m²        Intake/Output Summary (Last 24 hours) at 2/26/2019 1327  Last data filed at 2/26/2019 1200  Gross per 24 hour   Intake 2475 ml   Output 3481 ml   Net -1006 ml       Invasive Devices     Central Venous Catheter Line            Introducer 02/21/19 Internal jugular Left 4 days          Peripheral Intravenous Line            Peripheral IV 02/23/19 Right;Upper Arm 3 days    Peripheral IV 02/25/19 Right Antecubital 1 day          Arterial Line            Arterial Line 02/21/19 Radial 4 days          Hemodialysis Catheter            Permanent HD Catheter  8 days          Drain            NG/OG/Enteral Tube 4 days          Airway            ETT  Cuffed 8 mm 4 days                Physical Exam:   Intubated, sedated  Abdomen soft, NTND    Lab, Imaging and other studies:  CBC:   Lab Results   Component Value Date    WBC 11 00 (H) 02/26/2019    HGB 7 7 (L) 02/26/2019    HCT 24 3 (L) 02/26/2019    MCV 95 02/26/2019    PLT 81 (L) 02/26/2019    MCH 29 7 02/26/2019    MCHC 31 3 (L) 02/26/2019    RDW 16 5 (H) 02/26/2019    MPV 11 3 02/26/2019    NRBC 0 02/26/2019    NRBC 1 02/26/2019   , CMP:   Lab Results   Component Value Date    SODIUM 138 02/26/2019    K 3 9 02/26/2019     02/26/2019    CO2 27 02/26/2019    BUN 6 02/26/2019    CREATININE 0 76 02/26/2019    CALCIUM 7 9 (L) 02/26/2019    EGFR 100 02/26/2019   , Coagulation:   No results found for: PT, INR, APTT

## 2019-02-26 NOTE — PROGRESS NOTES
Progress Note - Pauline Dos Santos 60 y o  male MRN: [id number]    Unit/Bed#: [facility] 518-01 Encounter: [id number]      ASSESSMENT/ PLAN:     1  GI bleeding: s/p EGD x 3 and colonoscopy 2/25  Acute GI bleeding secondary to antral ulcer in setting of anticoagulation seen on EGD 2/22  Repeat EGD 2/23 and 2/25 with clean based ulcer in the antrum with no stigmata of recent bleeding  Given continued drop in Hgb, colonoscopy was also performed  This revealed ischemic colitis in sigmoid and descending colon and biopsy was taken  No signs of active GI bleeding on EGD or colonoscopy 2/25  Nursing reports green stool  They deny melena or hematochezia  His Hgb decreased slightly today from 8 3 to 7 6, likely secondary to ischemic colitis  -monitor Hgb  -transfuse as necessary  -monitor color of stool     2  Ischemic Colitis:  -f/u biopsies   -avoid hypotension  -given poor prep, repeat colonoscopy in 6/8 weeks     3  Esophagitis and PUD:   -f/u EGD biopsy to rule out h pylori  -continue PPI BID x 8 weeks, then daily  -repeat EGD in 6-8 weeks to ensure healing of ulcer     Subjective:     Patient seen and examined     Objective:     Vitals: Blood pressure 99/58, pulse 76, temperature 99 °F (37 2 °C), resp  rate 22, height 5' 9" (1 753 m), weight (!) 165 kg (362 lb 10 5 oz), SpO2 98 %  ,Body mass index is 53 56 kg/m²        Intake/Output Summary (Last 24 hours) at 2/26/2019 1035  Last data filed at 2/26/2019 1000  Gross per 24 hour   Intake 2466 ml   Output 3444 ml   Net -978 ml       Physical Exam:     General Appearance: Alert, appears stated age and cooperative  Lungs: Clear to auscultation bilaterally, intubated on vent  Heart: Regular rate and rhythm, S1, S2 normal, no murmur, click, rub or gallop  Abdomen: Soft, non-tender, non-distended; bowel sounds normal; no masses or no organomegaly  Extremities: No cyanosis, clubbing, edema    Invasive Devices     Central Venous Catheter Line            Introducer 02/21/19 Internal jugular Left 4 days          Peripheral Intravenous Line            Peripheral IV 02/23/19 Right;Upper Arm 3 days    Peripheral IV 02/25/19 Right Antecubital 1 day          Arterial Line            Arterial Line 02/21/19 Radial 4 days          Hemodialysis Catheter            Permanent HD Catheter  7 days          Drain            NG/OG/Enteral Tube 4 days          Airway            ETT  Cuffed 8 mm 4 days                Lab Results:    Results from last 7 days   Lab Units 02/26/19  0632  02/25/19  0530   WBC Thousand/uL 11 00*  --  12 51*   HEMOGLOBIN g/dL 7 6*   < > 8 2*   HEMATOCRIT % 24 3*  --  25 6*   PLATELETS Thousands/uL 81*  --  92*   NEUTROS PCT %  --   --  64   LYMPHS PCT %  --   --  5*   MONOS PCT %  --   --  7   EOS PCT %  --   --  15*    < > = values in this interval not displayed  Results from last 7 days   Lab Units 02/26/19  0530  02/22/19  0137 02/21/19  2243   POTASSIUM mmol/L 3 9   < > 3 9  --    CHLORIDE mmol/L 108   < > 100  --    CO2 mmol/L 26   < > 23  --    CO2, I-STAT mmol/L  --   --   --  26   BUN mg/dL 7   < > 77*  --    CREATININE mg/dL 0 88   < > 4 93*  --    CALCIUM mg/dL 8 1*   < > 7 0*  --    ALK PHOS U/L  --   --  54  --    ALT U/L  --   --  8*  --    AST U/L  --   --  12  --    GLUCOSE, ISTAT mg/dl  --   --   --  117    < > = values in this interval not displayed  Results from last 7 days   Lab Units 02/24/19  0559   INR  1 26*           Imaging Studies: I have personally reviewed pertinent imaging studies  Xr Chest Portable    Result Date: 1/30/2019  Impression: Slight progression of congestive heart failure  Fluid and/or infiltrate left lower lobe  Xr Chest Portable    Result Date: 1/27/2019  Impression: Cardiomegaly with stable vascular congestion and mildly increased left basilar opacity, likely atelectasis/pleural effusion  Xr Chest Portable    Result Date: 1/25/2019  Impression: Expected positioning of endotracheal and enteric tubes    A right internal jugular central venous catheter has been changed since previous examination with a new one being larger in caliber  No pneumothorax  Xr Chest Portable    Result Date: 1/24/2019  Impression: 1  Appropriately positioned left IJ catheter  No pneumothorax on either side  2   Otherwise, unchanged lines and tubes  3   Persistent pulmonary vascular congestion and atelectasis in the lingula and left lower lobe  Xr Chest Portable    Result Date: 1/24/2019  Impression: FINDINGS/IMPRESSION: 1  Right IJ catheter tip is superficial in location projecting over the region of the inferior right jugular vein  Recommend replacement  2   Appropriately positioned endotracheal tube  3   Unchanged enteric tube with side-port at the distal esophagus  4   Unchanged low-grade alveolar edema and left midlung platelike atelectasis  5   Unchanged heart and megaly and pulmonary vascular congestion  Xr Chest Portable    Result Date: 1/24/2019  Impression: 1  Endotracheal tube tip appears slightly retracted from prior  However, tip remains below the clavicles in the thoracic trachea  2   Alveolar edema appears decreased, but there are new platelike opacities in the left midlung field with atelectasis  3   Enteric tube tip in the stomach with side-port in distal esophagus  If the tube is being used for decompression, positioning is adequate  If the tube is being used for feeding, recommend advancing by 12 cm  4   No pneumothorax

## 2019-02-26 NOTE — PROGRESS NOTES
Progress Note - Cardiology   Kofi Heath 60 y o  male MRN: [id number]  Encounter: [id number]  02/26/19  11:31 AM        Assessment/Plan:    1  Paroxysmal afib/flutter  Started back on Metoprolol tartrate 25 bid 2/25, increased to 50 bid now  Had been up to 100 bid prior to GI bleed  Anticoagulation (warfarin) held due to recent GI bleeding and persistent anemia  HR currently controlled, in aflutter, episodes of HR up to 160s earlier today, titrate back up beta blocker as able  2  Stress cardiomyopathy when originally admitted in setting of bacteremia, resolved by echo 2/19/19 showing EF 55%  Currently beta blocker resumed  3  PATRICK requiring CVVH/ESRD  Currently on CVVH, transitioning to IHD when CVVH filter clots  Has significant net body overload  4  LBBB  Chronic, stable  5  BioAVR  No vegetation by CHON  On IV Kefzol for MSSA bacteremia  6  HTN  No longer requiring pressors, BP borderline low, monitor  7  MSSA bacteremia  On Kefzol 2 g tid  Follows with Dr Claritza Milligan as outpt  Subjective/Objective   Chief Complaint: No chief complaint on file  Subjective: 60 y o  with a history of AS s/p bioAVR, HTN, HL, LBBB, originally admitted 1/24/19 to [address] with chest pain and SOB, found to be in SVT by EMS, on initial assessment also found to be septic requiring pressors, as well as acute hypoxic respiratory failure requiring intubation  He was found to have MSSA bacteremia, was transferred to [facility] for further management  Echo showed decline in LV function to 30%, from prior 50%  He was treated with milrinone as well as pressors as it was felt he had mixed cardiogenic/septic shock  He required CVVH for PATRICK  CHON showed no vegetation  He also developed new afib/flutter which was treated with amiodarone  Milrinone was eventually able to be weaned off, as were pressors  Metoprolol was started in early 2/19  He was able to be extubated 2/6/19   Metoprolol was titrated up as tolerated for rate control  Repeat echo 2/19/19 showed EF normalized to 55%  He developed a diffuse body rash 2/20/19, so amiodarone was stopped due to concern that it was causing rash  He had issues with hypotension 2/21 requiring shorter dialysis session, then developed significant GI bleeding overnight 2/21-2/22, EGD showed large antral ulcer which was treated with clipping/injection  In setting of GI bleed required reintubation and pressor support, pressors were able to be stopped once bleeding resolved  Remains intubated, had colonoscopy 2/25 showing ischemic colitis  Remains on CVVH, getting iron infusion  No significant fevers  Not currently requiring pressors, but BP on lower side, Hgb remains in 7s, trending down slowly       Patient Active Problem List   Diagnosis    Aortic stenosis, mild    Essential hypertension    Hyperlipidemia    Left bundle branch block    Murmur    Osteoarthritis of both knees    Pericardial disease    Sciatica    Age-related cataract of right eye    Venous insufficiency    Bilateral leg edema    Stress-induced cardiomyopathy    Acute respiratory failure with hypoxia ([address] )    History of aortic valve replacement with bioprosthetic valve    Atrial fibrillation (HCC)    Staphylococcus aureus bacteremia    Thrombocytopenia (HCC)    Acute blood loss anemia    Leukocytosis    Cerebrovascular accident ([address] )    Toxic metabolic encephalopathy    Skin rash    Acute on chronic renal failure (HCC)    Hypovolemic shock (HCC)    GI bleed    Coagulopathy ([address] )    GI bleeding     Past Medical History:   Diagnosis Date    Allergic rhinitis     last assessed 9/12/12    Finger fracture, right     Closed fx of the middle phalanx of the right 5th finger  last assessed 1/30/14    Hemorrhoids     last assessed 2/10/14    Hyperlipidemia     Hypertension        Allergies   Allergen Reactions    Amiodarone      Developed Rash    Penicillins Rash     However, has subsequently tolerated Cefazolin and Cefepime       Current Facility-Administered Medications   Medication Dose Route Frequency Provider Last Rate Last Dose    acetaminophen (TYLENOL) tablet 650 mg  650 mg Oral Q6H PRN Lieutenant Felix PA-C   650 mg at 02/20/19 2007    atorvastatin (LIPITOR) tablet 40 mg  40 mg Oral Daily With JIM VenturaC   40 mg at 02/25/19 1603    bisacodyl (DULCOLAX) rectal suppository 10 mg  10 mg Rectal Daily PRN Lieutenant Felix PA-C        ceFAZolin (ANCEF) 2,000 mg in sodium chloride 0 9 % 50 mL IVPB  2,000 mg Intravenous Q8H Alois Necessary, PA-C 100 mL/hr at 02/26/19 1046 2,000 mg at 02/26/19 1046    chlorhexidine (PERIDEX) 0 12 % oral rinse 15 mL  15 mL Swish & Spit Q12H [id number] [address]   15 mL at 02/26/19 0820    dexmedetomidine (PRECEDEX) 200 mcg in sodium chloride 0 9 % 50 mL infusion  0 1-0 7 mcg/kg/hr Intravenous Titrated Veronica Hannah PA-C 16 6 mL/hr at 02/26/19 1047 0 4 mcg/kg/hr at 02/26/19 1047    fentanyl citrate (PF) 100 MCG/2ML 100 mcg  100 mcg Intravenous Q2H PRN Veronica Hannah PA-C        fentanyl citrate (PF) 100 MCG/2ML 50 mcg  50 mcg Intravenous Q2H PRN Bridger Patel PA-C   50 mcg at 02/26/19 0854    guaiFENesin (MUCINEX) 12 hr tablet 600 mg  600 mg Oral [facility] Lieutenant Felix, PA-C   600 mg at 02/26/19 0820    hydrocortisone 1 % cream   Topical BID Lieutenant Felix, PA-C   1 application at 76/25/26 0820    iron sucrose (VENOFER) 100 mg in sodium chloride 0 9 % 100 mL IVPB  100 mg Intravenous Once per day on Tue Thu Sat Lieutenant Felix, PA-C 105 mL/hr at 02/26/19 1046 100 mg at 02/26/19 1046    metoclopramide (REGLAN) injection 10 mg  10 mg Intravenous Q6H [facility] Fern Hannah PA-C   10 mg at 02/26/19 1131    metoprolol tartrate (LOPRESSOR) tablet 50 mg  50 mg Oral Q12H [facility] Fern Hannah PA-C   50 mg at 02/26/19 0848    NxStage K 4/Ca 3 dialysis solution (RFP-401) 20,000 mL  20,000 mL Dialysis Continuous Brendan Terrazas MD   20,000 mL at 02/26/19 1129  nystatin (MYCOSTATIN) powder   Topical BID Sally Donis PA-C   1 application at 98/94/27 6228    pantoprazole (PROTONIX) injection 40 mg  40 mg Intravenous Q12H [facility] Elliott Hu HERBERT Hannah   40 mg at 02/26/19 0024    polyvinyl alcohol (LIQUIFILM TEARS) 1 4 % ophthalmic solution 1 drop  1 drop Both Eyes Q3H PRN Sally Donis PA-C   1 drop at 02/13/19 1724    sucralfate (CARAFATE) oral suspension 1,000 mg  1,000 mg Oral BID AC Fern RASHIDA HERBERT Hannah   1,000 mg at 02/26/19 9016       Vitals: BP 99/58 Comment: Instructed to give below sbp parameter by Efra DOVER  Pulse 76   Temp 99 °F (37 2 °C)   Resp 22   Ht 5' 9" (1 753 m)   Wt (!) 165 kg (362 lb 10 5 oz)   SpO2 98%   BMI 53 56 kg/m²     Intake/Output Summary (Last 24 hours) at 2/26/2019 1131  Last data filed at 2/26/2019 1000  Gross per 24 hour   Intake 2360 ml   Output 3311 ml   Net -951 ml     Wt Readings from Last 3 Encounters:   02/26/19 (!) 165 kg (362 lb 10 5 oz)   01/24/19 (!) 154 kg (339 lb 8 1 oz)   06/11/18 (!) 155 kg (341 lb)       Body mass index is 53 56 kg/m²  ,     Vitals:    02/26/19 0848 02/26/19 0900 02/26/19 0930 02/26/19 1000   BP: 99/58      Pulse: (!) 130 (!) 128 (!) 106 76   Patient Position - Orthostatic VS:           Physical Exam:     GEN: intubated, sedated, in no acute distress  HEENT: Sclera anicteric, conjunctivae pink, mucous membranes moist   NECK: Supple, no carotid bruits, no significant JVD  HEART: regular rhythm, HR controlled, II/VI systolic murmur, no clicks, gallops or rubs  LUNGS: Clear to auscultation anteriorly bilaterally; no wheezes, rales, or rhonchi   ABDOMEN: Soft, nontender, nondistended, normoactive bowel sounds  EXTREMITIES: Skin warm and well perfused, no clubbing, cyanosis, positive for edema  NEURO: No focal findings  SKIN: Normal without suspicious lesions on exposed skin        Lab Results:     BMP:  Results from last 7 days   Lab Units 02/26/19  0530 02/25/19  7848 02/25/19  1824 02/25/19  1336 02/25/19  0530 02/24/19  2343 02/24/19  1833  02/21/19  2243   POTASSIUM mmol/L 3 9 3 9 4 1 3 9 3 6 4 0 3 9   < >  --    CHLORIDE mmol/L 108 107 107 108 107 109* 108   < >  --    CO2 mmol/L 26 26 25 25 25 26 25   < >  --    CO2, I-STAT mmol/L  --   --   --   --   --   --   --   --  26   BUN mg/dL 7 9 10 10 13 17 20   < >  --    CREATININE mg/dL 0 88 0 90 1 01 0 95 1 14 1 38* 1 66*   < >  --    GLUCOSE, ISTAT mg/dl  --   --   --   --   --   --   --   --  117   CALCIUM mg/dL 8 1* 7 9* 7 9* 8 1* 8 0* 7 8* 8 0*   < >  --     < > = values in this interval not displayed         CBC:   Results from last 7 days   Lab Units 02/26/19  0632 02/26/19  0530 02/25/19  2340 02/25/19  1824 02/25/19  1336 02/25/19  0530 02/25/19  0050  02/24/19  0559  02/23/19  1838 02/23/19  1223 02/23/19  0550 02/22/19  2342  02/22/19  0637 02/22/19  0137   WBC Thousand/uL 11 00*  --   --   --   --  12 51*  --   --  15 57*  --  15 22*  --  18 90*  --   --  22 04* 22 28*   HEMOGLOBIN g/dL 7 6* 7 9* 8 3* 8 6* 8 4* 8 2* 8 2*   < > 8 3*   < > 6 8* 6 5* 7 1* 7 0*   < > 8 1* 7 0*   HEMATOCRIT % 24 3*  --   --   --   --  25 6*  --   --  25 5*  --  20 5* 20 1* 21 2* 20 5*   < > 24 9* 21 7*   MCV fL 95  --   --   --   --  92  --   --  91  --  90  --  88  --   --  90 88   PLATELETS Thousands/uL 81*  --   --   --   --  92*  --   --  86*  --  97*  --  82*  --   --  121* 150   MCH pg 29 7  --   --   --   --  29 6  --   --  29 5  --  29 7  --  29 5  --   --  29 2 28 5   MCHC g/dL 31 3*  --   --   --   --  32 0  --   --  32 5  --  33 2  --  33 5  --   --  32 5 32 3   RDW % 16 5*  --   --   --   --  16 4*  --   --  16 3*  --  15 9*  --  15 6*  --   --  15 8* 15 2*   MPV fL 11 3  --   --   --   --  10 6  --   --  10 9  --  10 2  --  10 7  --   --  10 9 10 1   NRBC AUTO /100 WBCs 0  --   --   --   --  0  --   --  0  --   --   --   --   --   --  1  --    NRBC /100 WBC 1  --   --   --   --   --   --   --   --   --   --   --   --   --   -- 1  --     < > = values in this interval not displayed  Results from last 7 days   Lab Units 02/26/19  0530 02/25/19  2340 02/25/19  1824   MAGNESIUM mg/dL 2 0 1 9 2 1       INR:   Results from last 7 days   Lab Units 02/24/19  0559 02/23/19  1839 02/23/19  0826 02/22/19  0637 02/22/19  0137 02/21/19  2120 02/21/19  0511   INR  1 26* 1 26* 1 27* 1 36* 1 48* 2 65* 1 89*       Lipid Profile:   Lab Results   Component Value Date    CHOL 146 07/18/2014    CHOL 145 02/21/2014     Lab Results   Component Value Date    HDL 44 02/13/2019    HDL 41 06/02/2018    HDL 52 06/27/2017     Lab Results   Component Value Date    LDLCALC 77 02/13/2019    LDLCALC 57 06/02/2018    LDLCALC 129 (H) 06/27/2017     Lab Results   Component Value Date    TRIG 129 02/13/2019    TRIG 102 06/02/2018    TRIG 71 06/27/2017         Hgb A1c:         Telemetry: personally reviewed, aflutter HR in 60-70s currently, has been as high as 160s earlier this morning

## 2019-02-27 LAB
ANION GAP SERPL CALCULATED.3IONS-SCNC: 8 MMOL/L (ref 4–13)
BASOPHILS # BLD AUTO: 0.08 THOUSANDS/ΜL (ref 0–0.1)
BASOPHILS NFR BLD AUTO: 1 % (ref 0–1)
BUN SERPL-MCNC: 8 MG/DL (ref 5–25)
CA-I BLD-SCNC: 1.17 MMOL/L (ref 1.12–1.32)
CALCIUM SERPL-MCNC: 8.2 MG/DL (ref 8.3–10.1)
CHLORIDE SERPL-SCNC: 108 MMOL/L (ref 100–108)
CO2 SERPL-SCNC: 24 MMOL/L (ref 21–32)
CREAT SERPL-MCNC: 1.21 MG/DL (ref 0.6–1.3)
EOSINOPHIL # BLD AUTO: 2.28 THOUSAND/ΜL (ref 0–0.61)
EOSINOPHIL NFR BLD AUTO: 15 % (ref 0–6)
ERYTHROCYTE [DISTWIDTH] IN BLOOD BY AUTOMATED COUNT: 16.6 % (ref 11.6–15.1)
GFR SERPL CREATININE-BSD FRML MDRD: 65 ML/MIN/1.73SQ M
GLUCOSE SERPL-MCNC: 75 MG/DL (ref 65–140)
HCT VFR BLD AUTO: 26.6 % (ref 36.5–49.3)
HGB BLD-MCNC: 8.2 G/DL (ref 12–17)
IMM GRANULOCYTES # BLD AUTO: >0.5 THOUSAND/UL (ref 0–0.2)
IMM GRANULOCYTES NFR BLD AUTO: 13 % (ref 0–2)
LYMPHOCYTES # BLD AUTO: 0.96 THOUSANDS/ΜL (ref 0.6–4.47)
LYMPHOCYTES NFR BLD AUTO: 6 % (ref 14–44)
MAGNESIUM SERPL-MCNC: 2.1 MG/DL (ref 1.6–2.6)
MCH RBC QN AUTO: 29.9 PG (ref 26.8–34.3)
MCHC RBC AUTO-ENTMCNC: 30.8 G/DL (ref 31.4–37.4)
MCV RBC AUTO: 97 FL (ref 82–98)
MONOCYTES # BLD AUTO: 1.07 THOUSAND/ΜL (ref 0.17–1.22)
MONOCYTES NFR BLD AUTO: 7 % (ref 4–12)
NEUTROPHILS # BLD AUTO: 8.91 THOUSANDS/ΜL (ref 1.85–7.62)
NEUTS SEG NFR BLD AUTO: 58 % (ref 43–75)
NRBC BLD AUTO-RTO: 0 /100 WBCS
PHOSPHATE SERPL-MCNC: 2.4 MG/DL (ref 2.7–4.5)
PLATELET # BLD AUTO: 131 THOUSANDS/UL (ref 149–390)
PMV BLD AUTO: 11.7 FL (ref 8.9–12.7)
POTASSIUM SERPL-SCNC: 3.9 MMOL/L (ref 3.5–5.3)
RBC # BLD AUTO: 2.74 MILLION/UL (ref 3.88–5.62)
SODIUM SERPL-SCNC: 140 MMOL/L (ref 136–145)
WBC # BLD AUTO: 15.35 THOUSAND/UL (ref 4.31–10.16)

## 2019-02-27 PROCEDURE — 94762 N-INVAS EAR/PLS OXIMTRY CONT: CPT

## 2019-02-27 PROCEDURE — G8996 SWALLOW CURRENT STATUS: HCPCS

## 2019-02-27 PROCEDURE — 99232 SBSQ HOSP IP/OBS MODERATE 35: CPT | Performed by: INTERNAL MEDICINE

## 2019-02-27 PROCEDURE — 97530 THERAPEUTIC ACTIVITIES: CPT

## 2019-02-27 PROCEDURE — 99233 SBSQ HOSP IP/OBS HIGH 50: CPT | Performed by: INTERNAL MEDICINE

## 2019-02-27 PROCEDURE — 97535 SELF CARE MNGMENT TRAINING: CPT

## 2019-02-27 PROCEDURE — 83735 ASSAY OF MAGNESIUM: CPT | Performed by: PHYSICIAN ASSISTANT

## 2019-02-27 PROCEDURE — 92610 EVALUATE SWALLOWING FUNCTION: CPT

## 2019-02-27 PROCEDURE — G8988 SELF CARE GOAL STATUS: HCPCS

## 2019-02-27 PROCEDURE — 82330 ASSAY OF CALCIUM: CPT | Performed by: PHYSICIAN ASSISTANT

## 2019-02-27 PROCEDURE — G8987 SELF CARE CURRENT STATUS: HCPCS

## 2019-02-27 PROCEDURE — 85025 COMPLETE CBC W/AUTO DIFF WBC: CPT | Performed by: PHYSICIAN ASSISTANT

## 2019-02-27 PROCEDURE — G8982 BODY POS GOAL STATUS: HCPCS

## 2019-02-27 PROCEDURE — 97164 PT RE-EVAL EST PLAN CARE: CPT

## 2019-02-27 PROCEDURE — 99231 SBSQ HOSP IP/OBS SF/LOW 25: CPT | Performed by: INTERNAL MEDICINE

## 2019-02-27 PROCEDURE — 97110 THERAPEUTIC EXERCISES: CPT

## 2019-02-27 PROCEDURE — 97168 OT RE-EVAL EST PLAN CARE: CPT

## 2019-02-27 PROCEDURE — C9113 INJ PANTOPRAZOLE SODIUM, VIA: HCPCS | Performed by: PHYSICIAN ASSISTANT

## 2019-02-27 PROCEDURE — G8997 SWALLOW GOAL STATUS: HCPCS

## 2019-02-27 PROCEDURE — 94760 N-INVAS EAR/PLS OXIMETRY 1: CPT

## 2019-02-27 PROCEDURE — G8981 BODY POS CURRENT STATUS: HCPCS

## 2019-02-27 PROCEDURE — 84100 ASSAY OF PHOSPHORUS: CPT | Performed by: PHYSICIAN ASSISTANT

## 2019-02-27 PROCEDURE — 80048 BASIC METABOLIC PNL TOTAL CA: CPT | Performed by: PHYSICIAN ASSISTANT

## 2019-02-27 RX ORDER — MIDAZOLAM HYDROCHLORIDE 1 MG/ML
INJECTION INTRAMUSCULAR; INTRAVENOUS
Status: DISPENSED
Start: 2019-02-27 | End: 2019-02-27

## 2019-02-27 RX ORDER — METOPROLOL TARTRATE 50 MG/1
50 TABLET, FILM COATED ORAL ONCE
Status: DISCONTINUED | OUTPATIENT
Start: 2019-02-27 | End: 2019-02-27

## 2019-02-27 RX ORDER — LANOLIN ALCOHOL/MO/W.PET/CERES
6 CREAM (GRAM) TOPICAL
Status: DISCONTINUED | OUTPATIENT
Start: 2019-02-27 | End: 2019-03-16 | Stop reason: HOSPADM

## 2019-02-27 RX ORDER — PANTOPRAZOLE SODIUM 40 MG/1
40 TABLET, DELAYED RELEASE ORAL
Status: DISCONTINUED | OUTPATIENT
Start: 2019-02-27 | End: 2019-02-27

## 2019-02-27 RX ORDER — METOPROLOL TARTRATE 50 MG/1
50 TABLET, FILM COATED ORAL EVERY 12 HOURS SCHEDULED
Status: DISCONTINUED | OUTPATIENT
Start: 2019-02-27 | End: 2019-02-27

## 2019-02-27 RX ORDER — METOPROLOL TARTRATE 50 MG/1
100 TABLET, FILM COATED ORAL EVERY 12 HOURS SCHEDULED
Status: DISCONTINUED | OUTPATIENT
Start: 2019-02-27 | End: 2019-02-27

## 2019-02-27 RX ORDER — METOPROLOL TARTRATE 50 MG/1
100 TABLET, FILM COATED ORAL EVERY 12 HOURS SCHEDULED
Status: DISCONTINUED | OUTPATIENT
Start: 2019-02-27 | End: 2019-02-28

## 2019-02-27 RX ORDER — METOPROLOL TARTRATE 5 MG/5ML
10 INJECTION INTRAVENOUS ONCE
Status: COMPLETED | OUTPATIENT
Start: 2019-02-27 | End: 2019-02-27

## 2019-02-27 RX ADMIN — NYSTATIN: 100000 POWDER TOPICAL at 18:32

## 2019-02-27 RX ADMIN — CEFAZOLIN SODIUM 1000 MG: 10 INJECTION, POWDER, FOR SOLUTION INTRAVENOUS at 18:30

## 2019-02-27 RX ADMIN — Medication 20 MG: at 16:19

## 2019-02-27 RX ADMIN — NYSTATIN: 100000 POWDER TOPICAL at 10:32

## 2019-02-27 RX ADMIN — ATORVASTATIN CALCIUM 40 MG: 40 TABLET, FILM COATED ORAL at 16:20

## 2019-02-27 RX ADMIN — METOPROLOL TARTRATE 10 MG: 5 INJECTION, SOLUTION INTRAVENOUS at 06:29

## 2019-02-27 RX ADMIN — SUCRALFATE 1000 MG: 1 SUSPENSION ORAL at 16:19

## 2019-02-27 RX ADMIN — METOPROLOL TARTRATE 10 MG: 1 INJECTION, SOLUTION INTRAVENOUS at 08:46

## 2019-02-27 RX ADMIN — HYDROCORTISONE: 1 CREAM TOPICAL at 10:32

## 2019-02-27 RX ADMIN — MELATONIN 6 MG: at 23:06

## 2019-02-27 RX ADMIN — METOPROLOL TARTRATE 100 MG: 50 TABLET, FILM COATED ORAL at 12:00

## 2019-02-27 RX ADMIN — PANTOPRAZOLE SODIUM 40 MG: 40 INJECTION, POWDER, FOR SOLUTION INTRAVENOUS at 10:31

## 2019-02-27 RX ADMIN — METOPROLOL TARTRATE 100 MG: 50 TABLET, FILM COATED ORAL at 20:49

## 2019-02-27 RX ADMIN — HYDROCORTISONE: 1 CREAM TOPICAL at 18:32

## 2019-02-27 RX ADMIN — METOPROLOL TARTRATE 10 MG: 5 INJECTION, SOLUTION INTRAVENOUS at 03:21

## 2019-02-27 RX ADMIN — METOPROLOL TARTRATE 10 MG: 5 INJECTION, SOLUTION INTRAVENOUS at 10:31

## 2019-02-27 NOTE — PROGRESS NOTES
NEPHROLOGY PROGRESS NOTE   Marielena Sanches 60 y o  male MRN: [id number]  Unit/Bed#: [facility] 294-32 Encounter: [id number]      ASSESSMENT & PLAN:  1  Acute kidney injury suspected secondary to ATN in the setting of septic shock  Initially started on CVVH on 01/24, transitioned to IHD on 02/01 and was kept on a MWF schedule, patient became hemodynamically unstable and was transitioned back to CVVHD on 02/22  Currently off CVVHD  Plan for intermittent HD tomorrow  2  Stress cardiomyopathy in the setting of bacteremia  Resolved by last echo  3  Acute hypoxemic respiratory failure, extubated, improving  4  GI bleeding secondary to pyloric junction ulcer status post EGD x2   Status post injection and clipping    5  Acute blood loss anemia, monitor H&H and transfusion as needed  6  MSSA bacteremia, ID on board, antibiotics as per ID     7  Access right IJ PermCath in place  Discussed with critical care team     SUBJECTIVE:  Patient seen and examined, currently off CVVH after filter clotted overnight  Patient extubated  Patient denies any chest pain or shortness of Breath, blood pressure is stable    Patient complaining of feeling thirsty      OBJECTIVE:  Current Weight: Weight - Scale: (!) 162 kg (357 lb 2 3 oz)  Vitals:    02/27/19 0900   BP:    Pulse: (!) 114   Resp: (!) 24   Temp:    SpO2: 94%       Intake/Output Summary (Last 24 hours) at 2/27/2019 1138  Last data filed at 2/27/2019 1000  Gross per 24 hour   Intake 1008 ml   Output 1733 ml   Net -725 ml     General: conscious, cooperative, in not acute distress  Eyes: conjunctivae pale, anicteric sclerae  ENT: lips and mucous membranes moist, oxygen via nasal cannula  Neck: supple, no JVD  Chest:  Slightly decreased breath sounds bases, no crackles, ronchus or wheezings  CVS:  Irregularly regular, tachycardia  Abdomen: soft, non-tender, non-distended, normoactive bowel sounds  Extremities: + edema of both legs  Skin: no rash  Neuro: awake, alert, oriented  Right IJ PermCath in place      Medications:    Current Facility-Administered Medications:     acetaminophen (TYLENOL) tablet 650 mg, 650 mg, Oral, Q6H PRN, Aram Meek PA-C, 650 mg at 02/20/19 2007    atorvastatin (LIPITOR) tablet 40 mg, 40 mg, Oral, Daily With Quique Plaza PA-C, 40 mg at 02/25/19 1603    bisacodyl (DULCOLAX) rectal suppository 10 mg, 10 mg, Rectal, Daily PRN, Aram Meek PA-C    ceFAZolin (ANCEF) 1 g in sodium chloride 0 9% 50 ml IVPB, 1,000 mg, Intravenous, Q24H, Hayder Bernabe PA-C    guaiFENesin (MUCINEX) 12 hr tablet 600 mg, 600 mg, Oral, Q12H [address], Aram Meek PA-C, 600 mg at 02/26/19 0820    hydrocortisone 1 % cream, , Topical, BID, Aram Meek PA-C    iron sucrose (VENOFER) 100 mg in sodium chloride 0 9 % 100 mL IVPB, 100 mg, Intravenous, Once per day on Tue Thu Sat, Aram Meek PA-C, Last Rate: 105 mL/hr at 02/26/19 1046, 100 mg at 02/26/19 1046    metoprolol tartrate (LOPRESSOR) tablet 50 mg, 50 mg, Oral, Q12H [address], Venita Arguelles PA-C    midazolam (VERSED) 2 mg/2 mL injection **ADS Override Pull**, , , ,     NxStage K 4/Ca 3 dialysis solution (RFP-401) 20,000 mL, 20,000 mL, Dialysis, Continuous, Trevin Roy MD, Last Rate: 0 mL/hr at 02/26/19 2052, 20,000 mL at 02/26/19 2054    nystatin (MYCOSTATIN) powder, , Topical, BID, Aram Meek PA-C    pantoprazole (PROTONIX) EC tablet 40 mg, 40 mg, Oral, BID AC, Venita Arguelles PA-C    polyvinyl alcohol (LIQUIFILM TEARS) 1 4 % ophthalmic solution 1 drop, 1 drop, Both Eyes, Q3H PRN, Aram Meek PA-C, 1 drop at 02/13/19 1724    sucralfate (CARAFATE) oral suspension 1,000 mg, 1,000 mg, Oral, BID Fern ZARAGOZA PA-C, 1,000 mg at 02/26/19 9997    Invasive Devices:        Lab Results:   Results from last 7 days   Lab Units 02/27/19  0602 02/26/19  1829 02/26/19  1213 02/26/19  0632  02/25/19  0530  02/22/19  0137 02/21/19  2243  02/21/19  2120  02/21/19  2040   WBC Thousand/uL 15 35*  --   --  11 00* --  12 51*   < > 22 28*  --   --  22 95*   < >  --    HEMOGLOBIN g/dL 8 2* 9 0* 7 7* 7 6*   < > 8 2*   < > 7 0*  --    < > 6 4*  --   --    I STAT HEMOGLOBIN g/dl  --   --   --   --   --   --   --   --  7 1*  --   --   --  5 8*   HEMATOCRIT % 26 6*  --   --  24 3*  --  25 6*   < > 21 7*  --    < > 20 3*  --   --    HEMATOCRIT, ISTAT %  --   --   --   --   --   --   --   --  21*  --   --   --  17*   PLATELETS Thousands/uL 131*  --   --  81*  --  92*   < > 150  --   --  188   < >  --    SODIUM mmol/L 140 139 138  --    < > 140   < > 136  --   --  134*   < >  --    POTASSIUM mmol/L 3 9 4 4 3 9  --    < > 3 6   < > 3 9  --   --  4 1   < >  --    CHLORIDE mmol/L 108 108 108  --    < > 107   < > 100  --   --  100   < >  --    CO2 mmol/L 24 26 27  --    < > 25   < > 23  --   --  25   < >  --    CO2, I-STAT mmol/L  --   --   --   --   --   --   --   --  26  --   --   --  25   BUN mg/dL 8 6 6  --    < > 13   < > 77*  --   --  74*   < >  --    CREATININE mg/dL 1 21 0 86 0 76  --    < > 1 14   < > 4 93*  --   --  5 16*   < >  --    CALCIUM mg/dL 8 2* 8 3 7 9*  --    < > 8 0*   < > 7 0*  --   --  7 3*   < >  --    MAGNESIUM mg/dL 2 1 2 0 1 9  --    < > 2 0   < > 2 4  --   --  2 6   < >  --    PHOSPHORUS mg/dL 2 4* 1 6* 1 7*  --    < > 1 9*   < > 3 7  --   --  3 9   < >  --    ALK PHOS U/L  --   --   --   --   --   --   --  54  --   --  57  --   --    ALT U/L  --   --   --   --   --   --   --  8*  --   --  <6*  --   --    AST U/L  --   --   --   --   --   --   --  12  --   --  12  --   --    GLUCOSE, ISTAT mg/dl  --   --   --   --   --   --   --   --  117  --   --   --  115    < > = values in this interval not displayed  Portions of the record may have been created with voice recognition software  Occasional wrong word or "sound a like" substitutions may have occurred due to the inherent limitations of voice recognition software  Read the chart carefully and recognize, using context, where substitutions have occurred  If you have any questions, please contact the dictating provider

## 2019-02-27 NOTE — PROGRESS NOTES
Progress Note - General Surgery   Yue Martínez 60 y o  male MRN: [id number]  Unit/Bed#: [facility] 464-93 Encounter: [id number]    Assessment:  Mr Joey Francisco is a 59 y/o male patient with history of GI bleeding secondary to antral ulcer s/p EGD x2, injection and clipping  Distal colon with ischemic colitis  Abdomen soft, bowel movements non-bloody, Hemoglobin grossly stable  Tachycardia due to Atrial fibrillation  Extubated  Plan:  Recommend rectal tube removal  Benign abdominal exam  No acute surgical intervention required at this time; will sign off  Subjective/Objective   Chief Complaint: As above    Subjective: "I'm thirsty"    Objective:     Blood pressure 99/58, pulse (!) 132, temperature 98 3 °F (36 8 °C), temperature source Oral, resp  rate (!) 44, height 5' 9" (1 753 m), weight (!) 162 kg (357 lb 2 3 oz), SpO2 95 %  ,Body mass index is 52 74 kg/m²  Intake/Output Summary (Last 24 hours) at 2/27/2019 0630  Last data filed at 2/27/2019 0400  Gross per 24 hour   Intake 1504 ml   Output 2362 ml   Net -858 ml       Invasive Devices     Central Venous Catheter Line            Introducer 02/21/19 Internal jugular Left 5 days          Peripheral Intravenous Line            Peripheral IV 02/23/19 Right;Upper Arm 4 days    Peripheral IV 02/25/19 Right Antecubital 2 days          Arterial Line            Arterial Line 02/21/19 Radial 5 days          Hemodialysis Catheter            Permanent HD Catheter  8 days          Drain            Rectal Tube With balloon less than 1 day          Airway            ETT  Cuffed 8 mm 5 days                Physical Exam:  Gen: Alert, awake and oriented  CV: Irregularly irregular rhythm, tachycardic  Lungs: clear to auscultation bilaterally  Abd: Soft and depressible, bowel sounds present, non tender  Ext: No cyanosis or edema    Lab, Imaging and other studies:  I have personally reviewed pertinent lab results    , CBC:   Lab Results   Component Value Date    WBC 11 00 (H) 02/26/2019    HGB 9 0 (L) 02/26/2019    HCT 24 3 (L) 02/26/2019    MCV 95 02/26/2019    PLT 81 (L) 02/26/2019    MCH 29 7 02/26/2019    MCHC 31 3 (L) 02/26/2019    RDW 16 5 (H) 02/26/2019    MPV 11 3 02/26/2019    NRBC 0 02/26/2019    NRBC 1 02/26/2019   , CMP:   Lab Results   Component Value Date    SODIUM 139 02/26/2019    K 4 4 02/26/2019     02/26/2019    CO2 26 02/26/2019    BUN 6 02/26/2019    CREATININE 0 86 02/26/2019    CALCIUM 8 3 02/26/2019    EGFR 95 02/26/2019     VTE Pharmacologic Prophylaxis: Reason for no pharmacologic prophylaxis GI Bleeding  VTE Mechanical Prophylaxis: sequential compression device

## 2019-02-27 NOTE — PHYSICAL THERAPY NOTE
PHYSICAL THERAPY REeval and Tx note          Patient Name: Veronica GA Date: 2/27/2019 02/27/19 1324   Note Type   Note type Re-eval   Pain Assessment   Pain Assessment No/denies pain   Home Living   Type of [address]   Additional Comments please see initial eval for home set up    Prior Function   Level of [place] Independent with ADLs and functional mobility   Lives With Spouse   Receives Help From Family   ADL Assistance Independent   Falls in the last 6 months 0   Vocational Full time employment   Comments Please see initial eval for PLOF    Restrictions/Precautions   Weight Bearing Precautions Per Order No   Other Precautions O2;Fall Risk;Telemetry;Multiple lines;Cognitive   Cognition   Overall Cognitive Status Impaired   Arousal/Participation Responsive   Attention Attends with cues to redirect   Orientation Level Oriented to person;Oriented to place   Memory Decreased recall of recent events   Following Commands Follows one step commands with increased time or repetition   RUE Assessment   RUE Assessment X   LUE Assessment   LUE Assessment X   RLE Assessment   RLE Assessment X   Strength RLE   RLE Overall Strength 2/5   LLE Assessment   LLE Assessment X   Strength LLE   LLE Overall Strength 2/5   Bed Mobility   Rolling R 3  Moderate assistance   Additional items Assist x 2; Increased time required;Verbal cues   Rolling L 3  Moderate assistance   Additional items Assist x 2; Increased time required;Verbal cues   Supine to Sit 2  Maximal assistance   Additional items Increased time required;Verbal cues; Assist x 3   Sit to Supine 2  Maximal assistance   Additional items Increased time required;Verbal cues; Assist x 3   Transfers   Additional Comments Pt sat with min- max A w/ lateral lean noted   Standing not appropriate at this time   Ambulation/Elevation   Gait pattern Not appropriate   Balance   Static Sitting Poor   Endurance Deficit   Endurance Deficit Yes   Endurance Deficit Description fatigue and pain    Activity Tolerance   Activity Tolerance Patient limited by fatigue   Medical Staff Todd Valerio 20, OT    Nurse Made Aware Pt appropriate to be seen and mobilize per nsg   Assessment   Prognosis Fair   Problem List Decreased strength;Decreased range of motion;Decreased endurance; Impaired balance;Decreased mobility; Decreased cognition; Impaired judgement;Decreased safety awareness; Obesity   Assessment Pt [facility] on 1/24/2019  Pt seen for reeval due to change in medical status  Pt required ventilation and sedation as well as 10 units of blood  Pt seen S/p multiple EGDs as well as colonoscopy  Pt now extubated  Pt noted to have cog deficits this session and required constant ques to redirect attention  Pt able perform rolling in bed with mod A x 2 and supine to sit with max A x 2 which is increased A required compared to previous PT session  Pt currently limited at this time due to BLE weakness, decreased BLE ROM, functional mobility deficits, decreased balance, cog status, decreased safety awareness  Pt able to tolerate sitting EOB this session x 15 min, however noted to have L lateral lean and required min- Max A to correct  Pt not appropriate to attempt standing at this time due to decreased sitting balance and cog status  Pt agreeable to take part in PT treatment session at conclusion of PT reeval  PT will continue to follow  D/C recommendation when medically cleared is rehab due to decreased functional mobility compared to baseline and increased A needed from caregiver at current time      Barriers to Discharge Inaccessible home environment   Goals   Patient Goals " to get out of bed"   [facility] Expiration Date 03/09/19   Short Term Goal #1 In 10 days pt will complete: 1) Bed mobility skills (supine to sit) with mod A x2 to increase safety and independence as well as decrease caregiver burden  2) Improve balance grades to fair to increase safety with all mobility and decrease fall risk  3) Improve BLE strength by 1/2 grade to help increase overall functional mobility and decrease fall risk  4) PT for ongoing pt and family education; DME needs and D/C planning to promote highest level of function in least restrictive environment  Transfers and ambulation to be assessed when appropriate  PT to see at this time  Treatment Day 1   Plan   Treatment/Interventions LE strengthening/ROM; Therapeutic exercise; Endurance training;Patient/family training;Equipment eval/education; Bed mobility;Continued evaluation;Spoke to nursing;OT   PT Frequency Other (Comment)  (3-5x a week )   Recommendation   Recommendation Short-term skilled PT   Equipment Recommended   (continue to assess )   PT - OK to Discharge Yes  (to rehab when medically cleared )   Modified [place] Scale   Modified [place] Scale 4   Barthel Index   Feeding 5   Bathing 0   Grooming Score 0   Dressing Score 0   Bladder Score 0   Bowels Score 0   Toilet Use Score 0   Transfers (Bed/Chair) Score 5   Mobility (Level Surface) Score 0   Stairs Score 0   Barthel Index Score 10   Tx Note:  Time In: 1301  Time Out:1324  Total Time:23min  S: Pt willing to participate in PT session post PT reeval  O: Pt performed LAQ, Ankle pumps, Marches 2 sets of 10 reps for each exercise  Additional bed mobility performed due to BM noted while going sit to supine  Pt able to roll R and L with mod A x 2 and TC and VC required for hand placement and safety  A: Pt agreeable to take part in PT treatment following reevaluation  PT treatment to follow to facilitate therex due to strength deficits/ functional mobility deficits noted during reevaluation  Pt was able to perform LAQ, Ankle pumps, Marches  All exercises were performed 2 sets of 10 reps  Pt continues to require VC for proper form and pacing with all exercises   Pt denies any new complaints with therex  Pt noted to have BM while going to sit to supine and required mod A x 2 to roll L and R while nsg and OT performed pericare  Pt required frequent cues for hand placement during bed mobility as well as to redirect attention  At conclusion of PT session pt returned BTB with all needs within reach  Pt denies any further questions at this time  PT will continue to follow  D/C recommendation when medically cleared is rehab    P: Continue with POC as outlined in pts evaluation    Oda Mortimer, PT

## 2019-02-27 NOTE — RESTORATIVE TECHNICIAN NOTE
Restorative Specialist Mobility Note       Activity: Bedrest, Dangle     Assistive Device: None     Ambulation Response: Tolerated fairly well  Repositioned: Supine              Anti-Embolism Device On: Bilateral, Foot pump           Assisted therapy during session  For all/additional information please see therapy note(s)        Sherine Olivo Restorative Technician, BS

## 2019-02-27 NOTE — RESPIRATORY THERAPY NOTE
RT Protocol Note  Lilia Pace 60 y o  male MRN: [id number]  Unit/Bed#: [facility] 518-01 Encounter: [id number]    Assessment    Principal Problem:    GI bleed  Active Problems:    Left bundle branch block    Stress-induced cardiomyopathy    Acute respiratory failure with hypoxia (HCC)    History of aortic valve replacement with bioprosthetic valve    Atrial fibrillation (HCC)    Staphylococcus aureus bacteremia    Thrombocytopenia (HCC)    Acute blood loss anemia    Leukocytosis    Cerebrovascular accident ([address] )    Toxic metabolic encephalopathy    Skin rash    Acute on chronic renal failure (HCC)    Hypovolemic shock (HCC)    Coagulopathy (HCC)    GI bleeding      Home Pulmonary Medications:  none       Past Medical History:   Diagnosis Date    Allergic rhinitis     last assessed 9/12/12    Finger fracture, right     Closed fx of the middle phalanx of the right 5th finger  last assessed 1/30/14    Hemorrhoids     last assessed 2/10/14    Hyperlipidemia     Hypertension      Social History     Socioeconomic History    Marital status: /Civil Union     Spouse name: None    Number of children: None    Years of education: None    Highest education level: None   Occupational History    None   Social Needs    Financial resource strain: None    Food insecurity:     Worry: None     Inability: None    Transportation needs:     Medical: None     Non-medical: None   Tobacco Use    Smoking status: Never Smoker    Smokeless tobacco: Never Used   Substance and Sexual Activity    Alcohol use: Never     Frequency: Never     Comment: ocassion /never drank alcohol per [employer]     Drug use: No    Sexual activity: None   Lifestyle    Physical activity:     Days per week: None     Minutes per session: None    Stress: None   Relationships    Social connections:     Talks on phone: None     Gets together: None     Attends Anabaptism service: None     Active member of club or organization: None     Attends meetings of clubs or organizations: None     Relationship status: None    Intimate partner violence:     Fear of current or ex partner: None     Emotionally abused: None     Physically abused: None     Forced sexual activity: None   Other Topics Concern    None   Social History Narrative    None       Subjective    Subjective Data: denies dyspnea    Objective    Physical Exam:   Assessment Type: Assess only  General Appearance: Alert, Awake  Respiratory Pattern: Normal  Chest Assessment: Chest expansion symmetrical  Bilateral Breath Sounds: Clear, Diminished(very decreased at bases)  Cough: Non-productive    Vitals:  Blood pressure 109/69, pulse (!) 114, temperature 97 8 °F (36 6 °C), temperature source Oral, resp  rate (!) 24, height 5' 9" (1 753 m), weight (!) 162 kg (357 lb 2 3 oz), SpO2 94 %  Imaging and other studies: I have personally reviewed pertinent reports        O2 Device:      Plan    Respiratory Plan: Discontinue Protocol        Resp Comments: no resp problems since extubation yesterday; no pulm hx nor pulm home meds; BS clear but decreased, more so at bases; cont' with IS--dc protocol

## 2019-02-27 NOTE — PROGRESS NOTES
Progress Note - Abel Almeida 60 y o  male MRN: [id number]    Unit/Bed#: [facility] 518-01 Encounter: [id number]      ASSESSMENT/ PLAN:   1  GI bleeding: s/p EGD x 3 and colonoscopy 2/25  Acute GI bleeding secondary to antral ulcer in setting of anticoagulation seen on EGD 2/22  Repeat EGD 2/23 and 2/25 with clean based ulcer in the antrum with no stigmata of recent bleeding  Given continued drop in Hgb, colonoscopy was also performed  This revealed ischemic colitis in sigmoid and descending colon  Biopsy revealed inflammation and confirmed ischemic colitis  No signs of active GI bleeding on EGD or colonoscopy 2/25  Nursing reports green stool  He is having brown stool and his Hgb increased to 8 2   -monitor Hgb  -transfuse as necessary  -monitor color of stool      2  Ischemic Colitis: biopsy consistent with ischemic colitis   -avoid hypotension  -given poor prep, repeat colonoscopy in 6/8 weeks      3  Esophagitis and PUD:   -biopsy with inflammation, no h pylori   -continue PPI BID x 8 weeks, then daily  -repeat EGD in 6-8 weeks to ensure healing of ulcer     GI will sign off for now, please call with any further questions       Subjective:     Patient seen and examined     Objective:     Vitals: Blood pressure 109/69, pulse (!) 114, temperature 98 5 °F (36 9 °C), temperature source Oral, resp  rate (!) 24, height 5' 9" (1 753 m), weight (!) 162 kg (357 lb 2 3 oz), SpO2 94 %  ,Body mass index is 52 74 kg/m²        Intake/Output Summary (Last 24 hours) at 2/27/2019 1331  Last data filed at 2/27/2019 1000  Gross per 24 hour   Intake 861 ml   Output 1491 ml   Net -630 ml       Physical Exam:     General Appearance: A&Ox3, appears stated age and cooperative  Lungs: Clear to auscultation bilaterally, no rales or rhonchi  Heart: Regular rate and rhythm, S1, S2 normal, no murmur, click, rub or gallop  Abdomen: Soft, non-tender, non-distended; bowel sounds normal; no masses or no organomegaly, rectal tube in place with Sina Gordillo stool   Extremities: No cyanosis, clubbing, edema    Invasive Devices     Central Venous Catheter Line            Introducer 02/21/19 Internal jugular Left 5 days          Peripheral Intravenous Line            Peripheral IV 02/23/19 Right;Upper Arm 4 days    Peripheral IV 02/25/19 Right Antecubital 2 days          Arterial Line            Arterial Line 02/21/19 Radial 5 days          Hemodialysis Catheter            Permanent HD Catheter  9 days          Drain            Rectal Tube With balloon less than 1 day          Airway            ETT  Cuffed 8 mm 5 days                Lab Results:    Results from last 7 days   Lab Units 02/27/19  0602   WBC Thousand/uL 15 35*   HEMOGLOBIN g/dL 8 2*   HEMATOCRIT % 26 6*   PLATELETS Thousands/uL 131*   NEUTROS PCT % 58   LYMPHS PCT % 6*   MONOS PCT % 7   EOS PCT % 15*     Results from last 7 days   Lab Units 02/27/19  0602  02/22/19  0137 02/21/19  2243   POTASSIUM mmol/L 3 9   < > 3 9  --    CHLORIDE mmol/L 108   < > 100  --    CO2 mmol/L 24   < > 23  --    CO2, I-STAT mmol/L  --   --   --  26   BUN mg/dL 8   < > 77*  --    CREATININE mg/dL 1 21   < > 4 93*  --    CALCIUM mg/dL 8 2*   < > 7 0*  --    ALK PHOS U/L  --   --  54  --    ALT U/L  --   --  8*  --    AST U/L  --   --  12  --    GLUCOSE, ISTAT mg/dl  --   --   --  117    < > = values in this interval not displayed  Results from last 7 days   Lab Units 02/24/19  0559   INR  1 26*           Imaging Studies: I have personally reviewed pertinent imaging studies  Xr Chest Portable    Result Date: 1/30/2019  Impression: Slight progression of congestive heart failure  Fluid and/or infiltrate left lower lobe  Xr Chest Portable    Result Date: 1/27/2019  Impression: Cardiomegaly with stable vascular congestion and mildly increased left basilar opacity, likely atelectasis/pleural effusion  Xr Chest Portable    Result Date: 1/25/2019  Impression: Expected positioning of endotracheal and enteric tubes    A right internal jugular central venous catheter has been changed since previous examination with a new one being larger in caliber  No pneumothorax  Xr Chest Portable    Result Date: 1/24/2019  Impression: 1  Appropriately positioned left IJ catheter  No pneumothorax on either side  2   Otherwise, unchanged lines and tubes  3   Persistent pulmonary vascular congestion and atelectasis in the lingula and left lower lobe  Xr Chest Portable    Result Date: 1/24/2019  Impression: FINDINGS/IMPRESSION: 1  Right IJ catheter tip is superficial in location projecting over the region of the inferior right jugular vein  Recommend replacement  2   Appropriately positioned endotracheal tube  3   Unchanged enteric tube with side-port at the distal esophagus  4   Unchanged low-grade alveolar edema and left midlung platelike atelectasis  5   Unchanged heart and megaly and pulmonary vascular congestion  The study was marked in EPIC for significant notification  Xr Chest Portable    Result Date: 1/24/2019  Impression: 1  Endotracheal tube tip appears slightly retracted from prior  However, tip remains below the clavicles in the thoracic trachea  2   Alveolar edema appears decreased, but there are new platelike opacities in the left midlung field with atelectasis  3   Enteric tube tip in the stomach with side-port in distal esophagus  If the tube is being used for decompression, positioning is adequate  If the tube is being used for feeding, recommend advancing by 12 cm  4   No pneumothorax

## 2019-02-27 NOTE — PROGRESS NOTES
Progress Note - Cardiology   Lilia Pace 60 y o  male MRN: [id number]  Encounter: [id number]  02/27/19  11:37 AM        Assessment/Plan:    1  Paroxysmal afib/flutter  Started back on Metoprolol tartrate 25 bid 2/25, increased to 50 bid, but not given 2/26 PM or 2/27 AM due to lower BPs, getting intermittent IV metoprolol instead  Resume standing PO when able  Had been up to 100 bid prior to GI bleed  Anticoagulation (warfarin) held due to recent GI bleeding and persistent anemia  HR currently elevated, titrate back up beta blocker as able  2  Stress cardiomyopathy when originally admitted in setting of bacteremia, resolved by echo 2/19/19 showing EF 55%  3  PATRICK requiring CVVH/ESRD  Plan for IHD 2/28  Has significant net body overload  4  LBBB  Chronic, stable  5  BioAVR  No vegetation by CHON  On IV Kefzol for MSSA bacteremia  6  HTN  No longer requiring pressors, BP borderline low, monitor  7  MSSA bacteremia  On Kefzol 2 g tid  Follows with Dr Je Hollins as outpt  Subjective/Objective   Chief Complaint: No chief complaint on file  Subjective: 60 y o  with a history of AS s/p bioAVR, HTN, HL, LBBB, originally admitted 1/24/19 to [facility] with chest pain and SOB, found to be in SVT by EMS, on initial assessment also found to be septic requiring pressors, as well as acute hypoxic respiratory failure requiring intubation  He was found to have MSSA bacteremia, was transferred to [facility] for further management  Echo showed decline in LV function to 30%, from prior 50%  He was treated with milrinone as well as pressors as it was felt he had mixed cardiogenic/septic shock  He required CVVH for PATRICK  CHON showed no vegetation  He also developed new afib/flutter which was treated with amiodarone  Milrinone was eventually able to be weaned off, as were pressors  Metoprolol was started in early 2/19  He was able to be extubated 2/6/19  Metoprolol was titrated up as tolerated for rate control  Repeat echo 2/19/19 showed EF normalized to 55%  He developed a diffuse body rash 2/20/19, so amiodarone was stopped due to concern that it was causing rash  He had issues with hypotension 2/21 requiring shorter dialysis session, then developed significant GI bleeding overnight 2/21-2/22, EGD showed large antral ulcer which was treated with clipping/injection  In setting of GI bleed required reintubation and pressor support, pressors were able to be stopped once bleeding resolved  Colonoscopy 2/25 showing ischemic colitis  He has been extubated, currently awake and talking, hungry and wants to eat  No significant fevers  HR tachycardic now that not sedated  Denies SOB or CP, no palpitations       Patient Active Problem List   Diagnosis    Aortic stenosis, mild    Essential hypertension    Hyperlipidemia    Left bundle branch block    Murmur    Osteoarthritis of both knees    Pericardial disease    Sciatica    Age-related cataract of right eye    Venous insufficiency    Bilateral leg edema    Stress-induced cardiomyopathy    Acute respiratory failure with hypoxia ([address] )    History of aortic valve replacement with bioprosthetic valve    Atrial fibrillation (HCC)    Staphylococcus aureus bacteremia    Thrombocytopenia (HCC)    Acute blood loss anemia    Leukocytosis    Cerebrovascular accident ([address] )    Toxic metabolic encephalopathy    Skin rash    Acute on chronic renal failure (HCC)    Hypovolemic shock (HCC)    GI bleed    Coagulopathy ([address] )    GI bleeding     Past Medical History:   Diagnosis Date    Allergic rhinitis     last assessed 9/12/12    Finger fracture, right     Closed fx of the middle phalanx of the right 5th finger  last assessed 1/30/14    Hemorrhoids     last assessed 2/10/14    Hyperlipidemia     Hypertension        Allergies   Allergen Reactions    Amiodarone      Developed Rash    Penicillins Rash     However, has subsequently tolerated Cefazolin and Cefepime Current Facility-Administered Medications   Medication Dose Route Frequency Provider Last Rate Last Dose    acetaminophen (TYLENOL) tablet 650 mg  650 mg Oral Q6H PRN Aram Meek PA-C   650 mg at 02/20/19 2007    atorvastatin (LIPITOR) tablet 40 mg  40 mg Oral Daily With HERBERT Ventura   40 mg at 02/25/19 1603    bisacodyl (DULCOLAX) rectal suppository 10 mg  10 mg Rectal Daily PRN Aram Meek PA-C        ceFAZolin (ANCEF) 1 g in sodium chloride 0 9% 50 ml IVPB  1,000 mg Intravenous Q24H [place] ProstHERBERT        fentanyl citrate (PF) 100 MCG/2ML 100 mcg  100 mcg Intravenous Q2H PRN Marsha JulissaHERBERT        fentanyl citrate (PF) 100 MCG/2ML 50 mcg  50 mcg Intravenous Q2H PRN MarshaCECILE Harris-C   50 mcg at 02/26/19 0854    guaiFENesin (MUCINEX) 12 hr tablet 600 mg  600 mg Oral [facility] Aram Meek PA-C   600 mg at 02/26/19 0820    hydrocortisone 1 % cream   Topical BID Aram Meek PA-C        iron sucrose (VENOFER) 100 mg in sodium chloride 0 9 % 100 mL IVPB  100 mg Intravenous Once per day on Tue Thu Sat Aram Meek PA-C 105 mL/hr at 02/26/19 1046 100 mg at 02/26/19 1046    metoprolol (LOPRESSOR) injection 10 mg  10 mg Intravenous Q4H [place] ProstHERBERT   10 mg at 02/27/19 1031    midazolam (VERSED) 2 mg/2 mL injection **ADS Override Pull**             NxStage K 4/Ca 3 dialysis solution (RFP-401) 20,000 mL  20,000 mL Dialysis Continuous Trevin Roy MD 0 mL/hr at 02/26/19 2052 20,000 mL at 02/26/19 2054    nystatin (MYCOSTATIN) powder   Topical BID JIM SamuelsC        pantoprazole (PROTONIX) injection 40 mg  40 mg Intravenous Q12H [address] HERBERT Hannah   40 mg at 02/27/19 1031    polyvinyl alcohol (LIQUIFILM TEARS) 1 4 % ophthalmic solution 1 drop  1 drop Both Eyes Q3H PRN Aram Meek PA-C   1 drop at 02/13/19 1724    sucralfate (CARAFATE) oral suspension 1,000 mg  1,000 mg Oral BID AC Fern Hannah PA-C   1,000 mg at 02/26/19 2751 Vitals: /69   Pulse (!) 114   Temp 98 5 °F (36 9 °C) (Oral)   Resp (!) 24   Ht 5' 9" (1 753 m)   Wt (!) 162 kg (357 lb 2 3 oz)   SpO2 94%   BMI 52 74 kg/m²     Intake/Output Summary (Last 24 hours) at 2/27/2019 1137  Last data filed at 2/27/2019 1000  Gross per 24 hour   Intake 1008 ml   Output 1733 ml   Net -725 ml     Wt Readings from Last 3 Encounters:   02/27/19 (!) 162 kg (357 lb 2 3 oz)   01/24/19 (!) 154 kg (339 lb 8 1 oz)   06/11/18 (!) 155 kg (341 lb)       Body mass index is 52 74 kg/m²  ,     Vitals:    02/27/19 0600 02/27/19 0700 02/27/19 0800 02/27/19 0900   BP:   109/69    Pulse: (!) 132 (!) 118 (!) 136 (!) 114   Patient Position - Orthostatic VS:           Physical Exam:     GEN: awake, alert, in no acute distress  HEENT: Sclera anicteric, conjunctivae pink, mucous membranes moist   NECK: Supple, no carotid bruits, no significant JVD  HEART: tachycardic, irregular rhythm, HR mildly elevated, II/VI systolic murmur, no clicks, gallops or rubs  LUNGS: Clear to auscultation anteriorly bilaterally; no wheezes, rales, or rhonchi   ABDOMEN: Soft, nontender, nondistended, normoactive bowel sounds  EXTREMITIES: Skin warm and well perfused, no clubbing, cyanosis, positive for edema  NEURO: No focal findings  SKIN: Normal without suspicious lesions on exposed skin        Lab Results:     BMP:  Results from last 7 days   Lab Units 02/27/19  0602 02/26/19  1829 02/26/19  1213 02/26/19  0530 02/25/19  2340 02/25/19  1824 02/25/19  1336  02/21/19  2243   POTASSIUM mmol/L 3 9 4 4 3 9 3 9 3 9 4 1 3 9   < >  --    CHLORIDE mmol/L 108 108 108 108 107 107 108   < >  --    CO2 mmol/L 24 26 27 26 26 25 25   < >  --    CO2, I-STAT mmol/L  --   --   --   --   --   --   --   --  26   BUN mg/dL 8 6 6 7 9 10 10   < >  --    CREATININE mg/dL 1 21 0 86 0 76 0 88 0 90 1 01 0 95   < >  --    GLUCOSE, ISTAT mg/dl  --   --   --   --   --   --   --   --  117   CALCIUM mg/dL 8 2* 8 3 7 9* 8 1* 7 9* 7 9* 8 1*   < >  --     < > = values in this interval not displayed  CBC:   Results from last 7 days   Lab Units 02/27/19  0602 02/26/19  1829 02/26/19  1213 02/26/19  6210 02/26/19  0530 02/25/19  2340 02/25/19  1824  02/25/19  0530  02/24/19  0559  02/23/19  1838 02/23/19  1223 02/23/19  0550  02/22/19  0637   WBC Thousand/uL 15 35*  --   --  11 00*  --   --   --   --  12 51*  --  15 57*  --  15 22*  --  18 90*  --  22 04*   HEMOGLOBIN g/dL 8 2* 9 0* 7 7* 7 6* 7 9* 8 3* 8 6*   < > 8 2*   < > 8 3*   < > 6 8* 6 5* 7 1*   < > 8 1*   HEMATOCRIT % 26 6*  --   --  24 3*  --   --   --   --  25 6*  --  25 5*  --  20 5* 20 1* 21 2*   < > 24 9*   MCV fL 97  --   --  95  --   --   --   --  92  --  91  --  90  --  88  --  90   PLATELETS Thousands/uL 131*  --   --  81*  --   --   --   --  92*  --  86*  --  97*  --  82*  --  121*   MCH pg 29 9  --   --  29 7  --   --   --   --  29 6  --  29 5  --  29 7  --  29 5  --  29 2   MCHC g/dL 30 8*  --   --  31 3*  --   --   --   --  32 0  --  32 5  --  33 2  --  33 5  --  32 5   RDW % 16 6*  --   --  16 5*  --   --   --   --  16 4*  --  16 3*  --  15 9*  --  15 6*  --  15 8*   MPV fL 11 7  --   --  11 3  --   --   --   --  10 6  --  10 9  --  10 2  --  10 7  --  10 9   NRBC AUTO /100 WBCs 0  --   --  0  --   --   --   --  0  --  0  --   --   --   --   --  1   NRBC /100 WBC  --   --   --  1  --   --   --   --   --   --   --   --   --   --   --   --  1    < > = values in this interval not displayed                         Results from last 7 days   Lab Units 02/27/19  0602 02/26/19  1829 02/26/19  1213   MAGNESIUM mg/dL 2 1 2 0 1 9       INR:   Results from last 7 days   Lab Units 02/24/19  0559 02/23/19  1839 02/23/19  0826 02/22/19  0637 02/22/19  0137 02/21/19  2120 02/21/19  0511   INR  1 26* 1 26* 1 27* 1 36* 1 48* 2 65* 1 89*       Lipid Profile:   Lab Results   Component Value Date    CHOL 146 07/18/2014    CHOL 145 02/21/2014     Lab Results   Component Value Date    HDL 44 02/13/2019    HDL 41 06/02/2018    HDL 52 06/27/2017     Lab Results   Component Value Date    LDLCALC 77 02/13/2019    LDLCALC 57 06/02/2018    LDLCALC 129 (H) 06/27/2017     Lab Results   Component Value Date    TRIG 129 02/13/2019    TRIG 102 06/02/2018    TRIG 71 06/27/2017         Hgb A1c:         Telemetry: personally reviewed, afib, HR currently in low 100s

## 2019-02-27 NOTE — PROGRESS NOTES
Progress Note - Critical Care   Delfino Raymond 60 y o  male MRN: [id number]  Unit/Bed#: [facility] 518-01 Encounter: [id number]    Assessment:   Principal Problem:    GI bleed  Active Problems:    Left bundle branch block    Acute respiratory failure with hypoxia (HCC)    Acute blood loss anemia    Hypovolemic shock (HCC)    Staphylococcus aureus bacteremia    Thrombocytopenia (HCC)    Stress-induced cardiomyopathy    History of aortic valve replacement with bioprosthetic valve    Atrial fibrillation (HCC)    Leukocytosis    Cerebrovascular accident ([facility] [address] )    Toxic metabolic encephalopathy    Skin rash    Acute on chronic renal failure (HCC)    Coagulopathy (HCC)    GI bleeding  Resolved Problems:    Acute encephalopathy    NSTEMI (non-ST elevated myocardial infarction) ([facility] )    ESRD (end stage renal disease) ([facility])    Rhabdomyolysis    Cardiogenic shock (HCC)    Septic shock ([facility])    Transaminitis    Hypervolemia    Hyponatremia    Plan:     Neuro:   · Analgesia with: tylenol prn   · Delirium Precautions: CAM ICU daily, regulate sleep/wake cycle, avoid benzos and opiates as able  · Trend neuro exam    CV:   · Paroxysmal Atrial fibrillation - lopressor 10mg IV q 6 hours to lopressor 100 BID   No anticoagulation 2/2 GI Bleed   · MAP goal > 65   · Discontinue Arterial line  · Rhythm: Afib 130-150  · Follow rhythm on telemetry  · Cont Lipitor   Lung:   Acute hypoxic respiratory failure - resolved  · Extubated 2/26 to NC - remains hoarse, however voice is audible today  · Encourage deep breathing, coughing, and incentive spirometry  · Wean Nasal Cannula as tolerated  · SpO2 goal >94%  · Aggressive pulmonary toileting     GI:   GI bleed 2/2 to pyloric junction disorder and ischemic colitis  · Omeprazole BID PPI for stress ulcer prophylaxis  · GI following   · Bowel regimen - dulcolax   · Continue carafate   · Transfuse if Hgb < 7 5    FEN:   · Nutrition/diet plan: cleared for dysphagia - pureed, nectar thick liquid   · Replete electrolytes with goals: K >4 0, Mag >2 0, and Phos >3 0  · Fluid plan: negative   · Strict I/Os       :   Acute kidney injury suspected secondary to ATN in a setting of hypovolemic shock  · CVVH d/c'd on 2/26  He will transition back to IHD 2/27  · Trend UOP and BUN/creat  · Nephrology following, recommendations appreciated   · Avoid nephrotoxic medications    ID:   MSSA bacteremia  · Infectious disease following   · Continue Ancef 2 g IV per ID until 03/10/2019  · Goal normothermic  · Trend temps and WBC count  · Initial blood cultures positive for Staph Auerus, repeat BC from 1/27/19 negative  · If elevated temp greater 100 4 repeat blood cultures    Heme:   Acute blood loss anemia secondary to GI bleed  · Trend hgb and plts  · Transfuse as needed for goal hgb >7 5    Endo:   · Glycemic control plan: monitor blood glucose   · Monitor on daily BMP     MSK/Skin:  · Mobility goal:   · PT consult: yes  · OT consult: yes  · Frequent turning and pressure off-loading  · Local wound care as needed  · Daily Skin assessment     Disposition:  Transfer to SD level 2    ______________________________________________________________________    HPI/24hr events:  CVVH filter went down overnight, CVVH kept off  Plan to transition to IHD tomorrow  Offers no complaints  Denies chest pain/tendernes, SOB, abdominal pain, nausea or vomiting  Reports that he slept well and has appropriate appetite  No further episodes of melenoic  ______________________________________________________________________  Physical Exam:  Sheikh Agitation Sedation Scale (RASS): Alert and calm  Physical Exam   General: VSS, NAD  Head: NCAT, NT  Eyes: PERRL, EOMI    ENT: MMM, Pharynx normal    Neck: Trachea midline  No JVD  CV:  Irregular RR  No M/R/G  Lungs: CTAB,  No wheezes, rales  Abd: +BS, soft, NT/ND   No guarding/rebound   MSK:  Range of motion limited in all extremities by edema +3 to 4 in bilateral upper and lower extremities  Skin: Dry, intact  No abrasions, lacerations  Neuro: AAOx3, GCS 15, no focal neuro deficits    ______________________________________________________________________  Temperature:   Temp (24hrs), Av 3 °F (36 8 °C), Min:98 °F (36 7 °C), Max:98 5 °F (36 9 °C)    Current Temperature: 98 5 °F (36 9 °C)    Vitals:    19 0800 19 0845 19 0900 19 1100   BP: 109/69      Pulse: (!) 136  (!) 114    Resp:   (!) 24    Temp:    98 5 °F (36 9 °C)   TempSrc:       SpO2: 98% 95% 94%    Weight:       Height:         Arterial Line BP: 126/70       Weights:   IBW: 70 7 kg    Body mass index is 52 74 kg/m²  Weight (last 2 days)     Date/Time   Weight    19 0600   162 (357 15)  (Abnormal)     19 0600   165 (362 66)  (Abnormal)     19 0600   166 (366 18)  (Abnormal)             Height: 5' 9" (175 3 cm)  Hemodynamic Monitoring:  N/A       Ventilator Settings:   Vent Mode: CPAP/PS Spont              FiO2 (%): 40       No results found for: PHART, JKQ5VDE, PO2ART, HUF9LFD, X9HEOOSC, BEART, SOURCE    Intake and Outputs:  I/O       701 -  0700  07 0700    P  O   0 0    I V  (mL/kg) 783 (4 8) 437 (2 7)     Blood       NG/ 60     IV Piggyback 1627 891     Total Intake(mL/kg) 2630 (16) 1388 (8 6) 0 (0)    Urine (mL/kg/hr) 0 (0) 0 (0) 0 (0)    Emesis/NG output 50 0     Other 3282 1995     Stool 0 300 100    Total Output 3332 2295 100    Net -702 -907 -100           Unmeasured Stool Occurrence 2 x 2 x 1 x            Nutrition:        Diet Orders   (From admission, onward)            Start     Ordered    19 1244  Diet Dysphagia/Modified Consistency; Dysphagia 1-Pureed; Nectar Thick Liquid  Diet effective now     Question Answer Comment   Diet Type Dysphagia/Modified Consistency    Dysphagia/Modified Consistency Dysphagia 1-Pureed    Liquid Modifier Nectar Thick Liquid    RD to adjust diet per protocol?  Yes        19 1243          Labs: Results from last 7 days   Lab Units 02/27/19  0602 02/26/19 1829 02/26/19 1213 02/26/19  3312 02/26/19  0530 02/25/19  2340 02/25/19 1824 02/25/19  0530  02/24/19  0559  02/23/19  1838 02/23/19  1223 02/23/19  0550  02/22/19  0637   WBC Thousand/uL 15 35*  --   --  11 00*  --   --   --   --  12 51*  --  15 57*  --  15 22*  --  18 90*  --  22 04*   HEMOGLOBIN g/dL 8 2* 9 0* 7 7* 7 6* 7 9* 8 3* 8 6*   < > 8 2*   < > 8 3*   < > 6 8* 6 5* 7 1*   < > 8 1*   HEMATOCRIT % 26 6*  --   --  24 3*  --   --   --   --  25 6*  --  25 5*  --  20 5* 20 1* 21 2*   < > 24 9*   PLATELETS Thousands/uL 131*  --   --  81*  --   --   --   --  92*  --  86*  --  97*  --  82*  --  121*   NEUTROS PCT % 58  --   --   --   --   --   --   --  64  --  64  --   --   --   --   --   --    BANDS PCT %  --   --   --  1  --   --   --   --   --   --   --   --   --   --   --   --  2   MONOS PCT % 7  --   --   --   --   --   --   --  7  --  8  --   --   --   --   --   --    MONO PCT %  --   --   --  2*  --   --   --   --   --   --   --   --   --   --   --   --  4    < > = values in this interval not displayed       Results from last 7 days   Lab Units 02/27/19 0602 02/26/19 1829 02/26/19 1213 02/26/19  0530 02/25/19  2340 02/25/19 1824 02/25/19  1336  02/22/19  0137 02/21/19  2243 02/21/19  2120 02/21/19 2040   POTASSIUM mmol/L 3 9 4 4 3 9 3 9 3 9 4 1 3 9   < > 3 9  --  4 1   < >  --    CHLORIDE mmol/L 108 108 108 108 107 107 108   < > 100  --  100   < >  --    CO2 mmol/L 24 26 27 26 26 25 25   < > 23  --  25   < >  --    CO2, I-STAT mmol/L  --   --   --   --   --   --   --   --   --  26  --   --  25   BUN mg/dL 8 6 6 7 9 10 10   < > 77*  --  74*   < >  --    CREATININE mg/dL 1 21 0 86 0 76 0 88 0 90 1 01 0 95   < > 4 93*  --  5 16*   < >  --    CALCIUM mg/dL 8 2* 8 3 7 9* 8 1* 7 9* 7 9* 8 1*   < > 7 0*  --  7 3*   < >  --    ALBUMIN g/dL  --   --   --   --   --   --   --   --  1 9*  --  1 9*  --   --    ALK PHOS U/L  --   --   --   --   -- --   --   --  54  --  57  --   --    ALT U/L  --   --   --   --   --   --   --   --  8*  --  <6*  --   --    AST U/L  --   --   --   --   --   --   --   --  12  --  12  --   --    GLUCOSE, ISTAT mg/dl  --   --   --   --   --   --   --   --   --  117  --   --  115    < > = values in this interval not displayed  Results from last 7 days   Lab Units 02/27/19  0602 02/26/19  1829 02/26/19  1213 02/26/19  0530 02/25/19  2340 02/25/19  1824 02/25/19  1336   MAGNESIUM mg/dL 2 1 2 0 1 9 2 0 1 9 2 1 1 9   PHOSPHORUS mg/dL 2 4* 1 6* 1 7* 1 7* 2 3* 1 9* 1 9*      Results from last 7 days   Lab Units 02/24/19  0559 02/23/19  1839 02/23/19  0826 02/22/19  0637 02/22/19  0137 02/21/19  2120 02/21/19  0511   INR  1 26* 1 26* 1 27* 1 36* 1 48* 2 65* 1 89*         Results from last 7 days   Lab Units 02/23/19  1839   LACTIC ACID mmol/L 0 7     ABG:  Lab Results   Component Value Date    PHART 7 477 (H) 01/26/2019    POX6TNV 29 6 (LL) 01/26/2019    PO2ART 71 7 (L) 01/26/2019    BWX3OKF 21 4 (L) 01/26/2019    BEART -1 1 01/26/2019    SOURCE Line, Arterial 01/26/2019     VBG:  Results from last 7 days   Lab Units 02/22/19  1948   PH IVONNE  7 340   PCO2 IVONNE mm Hg 41 2*   PO2 IVONNE mm Hg 29 5*   HCO3 IVONNE mmol/L 21 7*   BASE EXC IVONNE mmol/L -3 7       Imaging: Xr Chest Portable    Result Date: 1/30/2019  Impression: Slight progression of congestive heart failure  Fluid and/or infiltrate left lower lobe  Workstation performed: [id number]     Xr Chest Portable    Result Date: 1/27/2019  Impression: Cardiomegaly with stable vascular congestion and mildly increased left basilar opacity, likely atelectasis/pleural effusion  Workstation performed: [id number]     Xr Chest Portable    Result Date: 1/25/2019  Impression: Expected positioning of endotracheal and enteric tubes  A right internal jugular central venous catheter has been changed since previous examination with a new one being larger in caliber  No pneumothorax   Workstation performed: [id number]     Xr Chest Portable    Result Date: 1/24/2019  Impression: 1  Appropriately positioned left IJ catheter  No pneumothorax on either side  2   Otherwise, unchanged lines and tubes  3   Persistent pulmonary vascular congestion and atelectasis in the lingula and left lower lobe  Workstation performed: [id number]     Xr Chest Portable    Result Date: 1/24/2019  Impression: FINDINGS/IMPRESSION: 1  Right IJ catheter tip is superficial in location projecting over the region of the inferior right jugular vein  Recommend replacement  2   Appropriately positioned endotracheal tube  3   Unchanged enteric tube with side-port at the distal esophagus  4   Unchanged low-grade alveolar edema and left midlung platelike atelectasis  5   Unchanged heart and megaly and pulmonary vascular congestion  The study was marked in EPIC for significant notification  Workstation performed: [id number]     Xr Chest Portable    Result Date: 1/24/2019  Impression: 1  Endotracheal tube tip appears slightly retracted from prior  However, tip remains below the clavicles in the thoracic trachea  2   Alveolar edema appears decreased, but there are new platelike opacities in the left midlung field with atelectasis  3   Enteric tube tip in the stomach with side-port in distal esophagus  If the tube is being used for decompression, positioning is adequate  If the tube is being used for feeding, recommend advancing by 12 cm  4   No pneumothorax  Workstation performed: [id number]  I have personally reviewed pertinent reports  Micro:  Blood Culture:   Lab Results   Component Value Date    BLOODCX No Growth After 5 Days  01/27/2019    BLOODCX No Growth After 5 Days   01/27/2019    BLOODCX Staphylococcus aureus (A) 01/26/2019    BLOODCX Staphylococcus aureus (A) 01/26/2019    BLOODCX Staphylococcus aureus (A) 01/24/2019    BLOODCX Staphylococcus aureus (A) 01/24/2019    BLOODCX Staphylococcus aureus (A) 01/23/2019     Urine Culture: Lab Results   Component Value Date    URINECX 1248-7480 cfu/ml Gram Positive Cocci (A) 01/23/2019     Sputum Culture: No components found for: SPUTUMCX  Wound Culure: No results found for: WOUNDCULT    Lab Results   Component Value Date    BLOODCX No Growth After 5 Days  01/27/2019    BLOODCX No Growth After 5 Days  01/27/2019    BLOODCX Staphylococcus aureus (A) 01/26/2019    BLOODCX Staphylococcus aureus (A) 01/26/2019    URINECX 2493-5006 cfu/ml Gram Positive Cocci (A) 01/23/2019    SPUTUMCULTUR 1+ Growth of Staphylococcus aureus (A) 01/24/2019       Allergies: Allergies   Allergen Reactions    Amiodarone      Developed Rash    Penicillins Rash     However, has subsequently tolerated Cefazolin and Cefepime     Medications:   Scheduled Meds:    Current Facility-Administered Medications:  acetaminophen 650 mg Oral Q6H PRN Joann Lagunas PA-C    atorvastatin 40 mg Oral Daily With [employer] HERBERT Foss    bisacodyl 10 mg Rectal Daily PRN Joann Lagunas PA-C    cefazolin 1,000 mg Intravenous Q24H Beata Acosta PA-C    hydrocortisone  Topical BID Joann Lagunas PA-C    iron sucrose 100 mg Intravenous Once per day on Tue Thu Sat Joann Lagunas PA-C Last Rate: 100 mg (02/26/19 1046)   metoprolol tartrate 100 mg Oral Q12H [address] Kristal Bond, CRNP    nystatin  Topical BID Joann Lagunas PA-C    omeprazole (PRILOSEC) suspension 2 mg/mL 20 mg Oral BID AC Jesus Mas PA-C    polyvinyl alcohol 1 drop Both Eyes Q3H PRN Joann Lagunas PA-C    sucralfate 1,000 mg Oral BID AC Fern RASHIDA Hannah PA-C      Continuous Infusions:   PRN Meds:    acetaminophen 650 mg Q6H PRN   bisacodyl 10 mg Daily PRN   polyvinyl alcohol 1 drop Q3H PRN     VTE Pharmacologic Prophylaxis: Reason for no pharmacologic prophylaxis GI bleed  VTE Mechanical Prophylaxis: sequential compression device  Invasive lines and devices:   Invasive Devices     Central Venous Catheter Line            Introducer 02/21/19 Internal jugular Left 5 days Peripheral Intravenous Line            Peripheral IV 02/23/19 Right;Upper Arm 4 days    Peripheral IV 02/25/19 Right Antecubital 2 days          Arterial Line            Arterial Line 02/21/19 Radial 5 days          Hemodialysis Catheter            Permanent HD Catheter  9 days          Drain            Rectal Tube With balloon less than 1 day          Airway            ETT  Cuffed 8 mm 5 days                   Code Status: Level 1 - Full Code    Portions of the record may have been created with voice recognition software  Occasional wrong word or "sound a like" substitutions may have occurred due to the inherent limitations of voice recognition software  Read the chart carefully and recognize, using context, where substitutions have occurred      [employer], [address] [place] [address] St

## 2019-02-27 NOTE — OCCUPATIONAL THERAPY NOTE
OccupationalTherapy Re-Evaluation & [address] [place] [address]     Patient Name: Terrence Hawkins  RWVIV'T Date: 2/27/2019  Problem List  Patient Active Problem List   Diagnosis    Aortic stenosis, mild    Essential hypertension    Hyperlipidemia    Left bundle branch block    Murmur    Osteoarthritis of both knees    Pericardial disease    Sciatica    Age-related cataract of right eye    Venous insufficiency    Bilateral leg edema    Stress-induced cardiomyopathy    Acute respiratory failure with hypoxia ([address] )    History of aortic valve replacement with bioprosthetic valve    Atrial fibrillation (HCC)    Staphylococcus aureus bacteremia    Thrombocytopenia (HCC)    Acute blood loss anemia    Leukocytosis    Cerebrovascular accident ([address] )    Toxic metabolic encephalopathy    Skin rash    Acute on chronic renal failure (HCC)    Hypovolemic shock (HCC)    GI bleed    Coagulopathy [facility] )    GI bleeding     Past Medical History  Past Medical History:   Diagnosis Date    Allergic rhinitis     last assessed 9/12/12    Finger fracture, right     Closed fx of the middle phalanx of the right 5th finger  last assessed 1/30/14    Hemorrhoids     last assessed 2/10/14    Hyperlipidemia     Hypertension      Past Surgical History  Past Surgical History:   Procedure Laterality Date    AORTIC VALVE REPLACEMENT  07/30/2014    AVR with 25mm [facility] Ease bovine pericardial valve    CARDIAC CATHETERIZATION  07/18/2014    SLB left main-normal, circumflex-normal, ramus intermedius-normal, RCA was large and dominant giving rise to the PDA & a large posterolateral branch  No disease  last assessed 8/19/14     COLONOSCOPY N/A 2/25/2019    Procedure: COLONOSCOPY;  Surgeon: Caprice Jones DO;  [facility]: [facility]; Service: Gastroenterology    ESOPHAGOGASTRODUODENOSCOPY N/A 2/22/2019    Procedure: ESOPHAGOGASTRODUODENOSCOPY (EGD)-roadshow overnight;  Surgeon: Caprice Jones DO;  [facility];   Service: Gastroenterology    ESOPHAGOGASTRODUODENOSCOPY N/A 2/23/2019    Procedure: ESOPHAGOGASTRODUODENOSCOPY (EGD); Surgeon: Rowena Capellan MD;  [facility]; Service: Gastroenterology    ESOPHAGOGASTRODUODENOSCOPY N/A 2/25/2019    Procedure: ESOPHAGOGASTRODUODENOSCOPY (EGD); Surgeon: Davon Diego DO;  [facility]; Service: Gastroenterology    IR [address]  2/4/2019    IR MINA LAST HSPTL PLACEMENT  2/18/2019    KNEE CARTILAGE SURGERY Left     medial meniscus tear     TESTICLE SURGERY      TONSILLECTOMY AND ADENOIDECTOMY      TOOTH EXTRACTION           02/27/19 1323   Note Type   Note type Re-eval   Restrictions/Precautions   Weight Bearing Precautions Per Order No   Other Precautions O2;Fall Risk;Telemetry;Multiple lines   Pain Assessment   Pain Assessment No/denies pain   Pain Score No Pain   Hospital Pain Intervention(s) Repositioned; Ambulation/increased activity   Response to Interventions tolerated   Home Living   Type of Home House   Home Layout Multi-level   Bathroom Shower/Tub Tub/shower unit   Bathroom Toilet Standard   Bathroom Equipment Grab bars in shower   Prior Function   Level of [place] Independent with ADLs and functional mobility  (assist w/ LB dressing)   Lives With Spouse   Receives Help From Family   ADL Assistance Independent   IADLs Independent   Falls in the last 6 months 0   Vocational Full time employment   Lifestyle   Autonomy I ADLS, IADLS, transfers and functional mobility PTA   Reciprocal Relationships Pt lives w/ spouse and mother in law   Service to Others Pt works full time as a     Intrinsic Gratification pt likes trains   Psychosocial   Psychosocial ([place]) [address] "Can I have some water?  I won't tell anyone"   ADL   Where Assessed Supine, bed   Eating Assistance 4  Minimal Assistance   Eating Deficit Thickened liquids   Grooming Assistance 2  Maximal Assistance   UB Bathing Assistance 2  Maximal Assistance   LB Bathing Assistance 1  Total Assistance   UB Dressing Assistance 2  Maximal Assistance   LB Dressing Assistance 1  Total Assistance   LB Dressing Deficit Don/doff R sock; Don/doff L sock   Bed Mobility   Rolling R 3  Moderate assistance   Additional items Assist x 2; Increased time required;LE management   Rolling L 3  Moderate assistance   Additional items Assist x 2; Increased time required;Verbal cues;LE management   Supine to Sit 2  Maximal assistance   Additional items Assist x 3; Increased time required;Verbal cues;LE management   Sit to Supine 2  Maximal assistance   Additional items Assist x 3; Increased time required;Verbal cues;LE management   Transfers   Additional Comments pt sat EOB w/ Min --> Max support w/ lateral lean to L side even w/ cueing  Not appropriate to stand at this time  Balance   Static Sitting Poor  (ranging from poor + to poor -)   Activity Tolerance   Activity Tolerance Patient limited by fatigue   Medical Staff Made Aware PT [facility] present, restorative [place]   Nurse Made Aware RN Mariusz Moeller present for inspection of rectal tube 2* soiled linens   RUE Assessment   RUE Assessment X  (pt w/ edema in bilateral extremities decreased ROM)   LUE Assessment   LUE Assessment X  (pt w/ edema in bilateral extremities decreased ROM)   Hand Function   Gross Motor Coordination Impaired   Fine Motor Coordination Impaired   Cognition   Overall Cognitive Status Impaired   Arousal/Participation Arousable   Attention Attends with cues to redirect   Orientation Level Oriented to person;Oriented to place; Disoriented to time;Disoriented to situation   Memory Decreased recall of precautions;Decreased recall of recent events;Decreased short term memory   Following Commands Follows one step commands with increased time or repetition   Comments pt requiring increased verbal cueing   Assessment   Limitation Decreased ADL status; Decreased Safe judgement during ADL;Decreased cognition;Decreased endurance;Decreased fine motor control;Decreased self-care trans;Decreased high-level ADLs; Decreased UE ROM; Decreased UE strength   Prognosis Fair   Assessment Pt seen for re-eval 2* change in status and subsequent ventilation/ sedation and 10 units of blood  Pt is s/p EGD x 3 and colonoscopy   Pt now extubated and off sedation  Pt impaired cognitively t/o session and required redirection to task  Pt is Mod A x 2 for rolling, Max A x 3 for bed mobility, total assist for LB ADLs, and Max A for UB ADLs  Pt is limited at this time 2*: endurance, activity tolerance, functional mobility, balance, trunk control, functional standing tolerance, unsupportive home environment, decreased I w/ ADLS/IADLS, cognitive impairments, decreased safety awareness and decreased insight into deficits  The following Occupational Performance Areas to address include: grooming, bathing/shower, toilet hygiene, dressing, medication management, functional mobility, community mobility and clothing management  Pt scored overall  10/100 on the Barthel Index  From OT standpoint, anticipate d/c STR  Pt to continue to benefit from acute immediate OT services to address the following goals 3-5x/week to  w/in 10-14 days: Isatu Collazo Goals   Patient Goals To get out of bed   LTG Time Frame 10-   Plan   Treatment Interventions ADL retraining; Endurance training;UE strengthening/ROM; Cognitive reorientation;Patient/family training; Compensatory technique education;Continued evaluation; Energy conservation; Activityengagement;Equipment evaluation/education; Functional transfer training   Goal Expiration Date 19   Treatment Day 1   OT Frequency 3-5x/wk   Additional Treatment Session   Start Time 1300   End Time 1323   Treatment Assessment Pt seen for additional tx session 2* soiled bed linens  Pt required Mod A x 2 for rolling and total assist for perineal hygiene  Pt is limited 2* decreased activity tolerance, decreased ADL status, and impaired cognition  Continue to recommend STR  OT will continue to follow  Recommendation   OT Discharge Recommendation Short Term Rehab   OT - OK to Discharge Yes   Barthel Index   Feeding 5   Bathing 0   Grooming Score 0   Dressing Score 0   Bladder Score 0   Bowels Score 0   Toilet Use Score 0   Transfers (Bed/Chair) Score 5   Mobility (Level Surface) Score 0   Stairs Score 0   Barthel Index Score 10   Modified [place] Scale   Modified [place] Scale 4     1) Pt will improve activity tolerance to G for min 30 min txment sessions    2) Pt will complete ADLs/self care w/ Mod A    3) Pt will complete toileting w/ Mod A w/ G hygiene/thoroughness using DME PRN    4) Pt will improve fx'l tfers on/off all surfaces using dME PRN w/ G balance/safety including toileting w/  Mod A     5) Functional mobility to be assessed by OTR  6) Pt will engage in ongoing cognitive assessment w/ G participation to A w/ safe d/c planning/recommendations    7) Pt will demonstrate G carryover of pt/caregiver education and training as appropriate w/ mod I w/o cues w/ G tolerance    8) Pt will improve UB fx'l use/ROM to [facility] and strength 1/2 MMT via AROM/AAROM/PROM in all planes as tolerated s/p skilled education of HEP w/ mod I w/o cues for carryover    9) Pt will follow 100% simple one step verbal commands and be A/Ox4 consistently t/o w use of external environmental cues

## 2019-02-27 NOTE — PROGRESS NOTES
Transfer Note - ICU Transfer to SD/MS tele   Ginny Rodriguezr 60 y o  male MRN: [id number]  [facility]   Unit/Bed#: [address] 162-73 Encounter: [id number]    Code Status: Level 1 - Full Code    Reason for ICU adm:  Hypovolemic shock    Active problems:   Principal Problem:    GI bleed  Active Problems:    Left bundle branch block    Acute respiratory failure with hypoxia (HCC)    Acute blood loss anemia    Hypovolemic shock (HCC)    Staphylococcus aureus bacteremia    Thrombocytopenia (HCC)    Stress-induced cardiomyopathy    History of aortic valve replacement with bioprosthetic valve    Atrial fibrillation (HCC)    Leukocytosis    Cerebrovascular accident ([facility] [address] )    Toxic metabolic encephalopathy    Skin rash    Acute on chronic renal failure ([facility] )    Coagulopathy ([facility] [address] )    GI bleeding  Resolved Problems:    Acute encephalopathy    NSTEMI (non-ST elevated myocardial infarction) ([facility] [address] )    ESRD (end stage renal disease) ([facility] [address] )    Rhabdomyolysis    Cardiogenic shock ([facility] [address] )    Septic shock ([facility] [address] )    Transaminitis    Hypervolemia    Hyponatremia      Consultants:  Nephrology, gastroenterology and cardiology    History of Present Illness: 60year old man with obesity, HTN, bioprosthetic AoVR presented from [facility] for shock, bacteremia and renal failure  Details obtained from chart and his wife at the bedside  USOH until Monday, sent home from work with fever and back/neck redness  Had taken APAP and felt improved the following day, however that evening became confused and significant dyspnea  EMS contacted and taken to [facility] ED  Per report SVT given adenosine, likely afib and respiratory distress requiring intubation  Numerous metabolic abnormalities seen  Multiple vasopressors required, low EF on TTE and progressive renal decline prompted transfer, placed on bicarb gtt prior to transfer, minimal UOP  ID consultation placed on ancef and flagyl given bacteremia        Summary of clinical course:   1/23 CXR: Findings of volume overload including pulmonary vascular congestion and mild alveolar edema  1/23 CTH: 12 hour follow-up reassessment suggested as there could be subacute ischemia  No hemorrhage  1/23 CT CH/AB/P: Patchy consolidative changes at the bilateral lung bases, likely secondary to atelectasis, or  acute infiltrate, possibly secondary to aspiration   1/23 [facility] MSSA  1/24 R IJ temp HD catheter and L IJ TLC  1/24 CRRT  1/24 BC staph aureus  1/25: CHON: no vegetation appreciated EF 40% PFO  1/27 Repeat BC negative  2/06 Extubated  2/21: L radial A-line, LIJ Cordis  2/21: V-tach, defibrillation x2, lido 100x3, amio 150x2, amio infusion   2/21: 9u PRBC, 2 FFP, 10 vit Kx2  2/22: Intubation  2/22 CTA a/p: Small foci of hyperdensity within the gastric antrum/pylorus area concerning for primary site of active hemorrhage given history of melanoma  Extensive distention of the entire colon with liquid stool and gas with loss of haustra within the descending colon and rectum  There are areas of pneumatosis seen within the splenic flexure and sigmoid colon  2/22 EGD large gastric ulcer cauterized and injected with epi, smaller adjacent ulcer clipped  2/23 EGD - no intervention   2/23 5 PRBCs, 2 FFP, 1 plt  2/24 1 PRBCs  2/25 EGD clean margins of ulcer  2/25 C-scope: patchy ischemic colitis, decompressive colonoscopy  2/26: Extubation     Patient extubated to nasal cannula O2 to 4 L for mild respiratory insufficiency  Currently all critical care needs are addressed  Nephrology following patient will start I HD tomorrow  He has been transition from IV Lopressor to home dose of Lopressor 100 mg b i d  For management of his paroxysmal atrial fibrillation, currently holding Coumadin in the setting of recent GI bleed  Cardiology is following  Remains on PPI b i d , GI following  All critical care needs addressed at this time will transfer patient to step-down 2 on General Medicine service      Recent or scheduled procedures:    GI recommends colonoscopy follow-up in 6-8 weeks    Outstanding/pending diagnostics: None    Cultures: None       Mobilization Plan: Rosalind ferraro, PT/OT     Nutrition Plan: dysphagia diet, speech following     Discharge Plan:     Initial /Plan: following dispo      Specific Diagnosis Plan:    CV:   · Paroxysmal Atrial fibrillation - lopressor 10mg IV q 6 hours to lopressor 100 BID  No anticoagulation 2/2 GI Bleed   ? MAP goal > 65   ? Discontinue Arterial line  · Rhythm: Afib 130-150  ? Follow rhythm on telemetry  ? Cont Lipitor     GI:   GI bleed 2/2 to pyloric junction disorder and ischemic colitis  · Omeprazole BID PPI for stress ulcer prophylaxis  · GI following   · Bowel regimen - dulcolax   · Continue carafate   · Transfuse if Hgb < 7 5      :   Acute kidney injury suspected secondary to ATN in a setting of hypovolemic shock  · CVVH d/c'd on 2/26  He will transition back to IHD 2/27  · Trend UOP and BUN/creat  · Nephrology following, recommendations appreciated   · Avoid nephrotoxic medications    ID:   MSSA bacteremia  · Infectious disease following   ? Continue Ancef 2 g IV per ID until 03/10/2019  ? Goal normothermic  · Trend temps and WBC count  · Initial blood cultures positive for Staph Auerus, repeat BC from 1/27/19 negative  · If elevated temp greater 100 4 repeat blood cultures        Spoke with Dr Leana William  regarding transfer  Please call ext [phone] with any questions or concerns  Portions of the record may have been created with voice recognition software  Occasional wrong word or "sound a like" substitutions may have occurred due to the inherent limitations of voice recognition software  Read the chart carefully and recognize, using context, where substitutions have occurred      Tee Alan, Manish Lee

## 2019-02-27 NOTE — PLAN OF CARE
Problem: PHYSICAL THERAPY ADULT  Goal: Performs mobility at highest level of function for planned discharge setting  See evaluation for individualized goals  Description  Treatment/Interventions: LE strengthening/ROM, Therapeutic exercise, Endurance training, Patient/family training, Cognitive reorientation, Equipment eval/education, Bed mobility, Spoke to advanced practitioner, Spoke to case management, Spoke to nursing  Equipment Recommended:  (TBD- may benefit from [facility] bed)       See flowsheet documentation for full assessment, interventions and recommendations  Outcome: Progressing  Note:   Prognosis: Fair  Problem List: Decreased strength, Decreased range of motion, Decreased endurance, Impaired balance, Decreased mobility, Decreased cognition, Impaired judgement, Decreased safety awareness, Obesity  Assessment: Pt originally presented at [facility] on 1/24/2019  Pt seen for reeval due to change in medical status  Pt required ventilation and sedation as well as 10 units of blood  Pt seen S/p multiple EGDs as well as colonoscopy  Pt now extubated  Pt noted to have cog deficits this session and required constant ques to redirect attention  Pt able perform rolling in bed with mod A x 2 and supine to sit with max A x 2 which is increased A required compared to previous PT session  Pt currently limited at this time due to BLE weakness, decreased BLE ROM, functional mobility deficits, decreased balance, cog status, decreased safety awareness  Pt able to tolerate sitting EOB this session x 15 min, however noted to have L lateral lean and required min- Max A to correct  Pt not appropriate to attempt standing at this time due to decreased sitting balance and cog status  Pt agreeable to take part in PT treatment session at conclusion of PT reeval  PT will continue to follow   D/C recommendation when medically cleared is rehab due to decreased functional mobility compared to baseline and increased A needed from caregiver at current time  Barriers to Discharge: Inaccessible home environment     Recommendation: Short-term skilled PT     PT - OK to Discharge: Yes(to rehab when medically cleared )    See flowsheet documentation for full assessment

## 2019-02-27 NOTE — SPEECH THERAPY NOTE
Bedside Swallow Evaluation:    Summary:  Pt presents w/ mild pharyngeal dysphagia characterized by some increased work of breathing and minor drop in O2 sats with ice chips and small amounts of thin liquids  The patient is a 59 y/o male with obesity, HTN, bioprosthetic AoVR presented from [facility] for shock, bacteremia and renal failure  Details obtained from chart and his wife at the bedside  USOH until Monday, sent home from work with fever and back/neck redness  Had taken APAP and felt improved the following day, however that evening became confused and significant dyspnea  EMS contacted and taken to [facility] ED  Per report SVT given adenosine, likely afib and respiratory distress requiring intubation  Numerous metabolic abnormalities seen  Multiple vasopressors required, low EF on TTE and progressive renal decline prompted transfer, placed on bicarb gtt prior to transfer, minimal UOP  ID consultation placed on ancef and flagyl given bacteremia  Patient underwent EGD on 2/25 which indicates an antral ulcer in the setting of anticoagulation, but no active bleeding  Patient extubated yesterday and dysphagia consult placed to assess for PO diet  Recommendations:  Diet: Puree  Liquid: Nectar thick liquids   Meds: Crushed in puree   Supervision: 1:1  Positioning:Upright  Strategies: Pt to take PO/Meds only when fully alert and upright     Oral care: As needed     Aspiration precautions    Therapy Prognosis: Good  Prognosis considerations: Complicated recent hospital events   Frequency: 3-5x week    Patient's goal: "I'd love some ice cream"     Consider consult w/:  None at this time     Reason for consult:  R/o aspiration  Determine safest and least restrictive diet  Failed nursing dysphagia assessment  respiratory compromise  Nursing reported cough w/ PO    Precautions:  None    Current diet: NPO    Premorbid diet::Regular with thin liquids     Previous VBS: None    O2 requirement: Patient on RA    Voice/Speech: Decreased volume     Social: Lives with wife     Follows commands: WFL                          Cognitive Status: WFL    [facility]:  Dentition: Edentulous   Labial strength and ROM: WFL  Lingual strength and ROM: WFL  Mandibular strength and ROM: WFL  Velum: WFL  Secretion management: WFL    Items administered:  Puree solids with honey thick liquid, nectar thick liquid and thin liquids  Liquids were taken by straw/tsp  Oral stage: WFL  Lip closure: WFL  Mastication: N/A  Bolus formation: WFL  Bolus control: WFL  Transfer: WFL  Oral residue: No  Pocketing: No    Pharyngeal stage: Mild  Swallow promptness: WFL  Laryngeal rise: Min reduced   Wet voice: No  Throat clear: No  Cough: No  Secondary swallows: No  Audible swallows: No  Min drop in O2 with thin liquids (from 92-89%)    Esophageal stage:  No s/s reported    Aspiration precautions posted    Results d/w:  Pt, nursing    Goal(s):  Pt will tolerate least restrictive diet w/out s/s aspiration or oral/pharyngeal difficulties

## 2019-02-27 NOTE — PLAN OF CARE
Problem: OCCUPATIONAL THERAPY ADULT  Goal: Performs self-care activities at highest level of function for planned discharge setting  See evaluation for individualized goals  Description  Treatment Interventions: ADL retraining, Functional transfer training, UE strengthening/ROM, Endurance training, Cognitive reorientation, Patient/family training, Equipment evaluation/education, Fine motor coordination activities, Compensatory technique education, Continued evaluation, Energy conservation, Activityengagement          See flowsheet documentation for full assessment, interventions and recommendations  Note:   Limitation: Decreased ADL status, Decreased Safe judgement during ADL, Decreased cognition, Decreased endurance, Decreased fine motor control, Decreased self-care trans, Decreased high-level ADLs, Decreased UE ROM, Decreased UE strength  Prognosis: Fair  Assessment: Pt seen for re-eval 2* change in status and subsequent ventilation/ sedation and 10 units of blood  Pt is s/p EGD x 3 and colonoscopy   Pt now extubated and off sedation  Pt impaired cognitively t/o session and required redirection to task  Pt is Mod A x 2 for rolling, Max A x 3 for bed mobility, total assist for LB ADLs, and Max A for UB ADLs  Pt is limited at this time 2*: endurance, activity tolerance, functional mobility, balance, trunk control, functional standing tolerance, unsupportive home environment, decreased I w/ ADLS/IADLS, cognitive impairments, decreased safety awareness and decreased insight into deficits  The following Occupational Performance Areas to address include: grooming, bathing/shower, toilet hygiene, dressing, medication management, functional mobility, community mobility and clothing management  Pt scored overall  10/100 on the Barthel Index  From OT standpoint, anticipate d/c STR  Pt to continue to benefit from acute immediate OT services to address the following goals 3-5x/week to  w/in 10-14 days:   OT Discharge Recommendation: Short Term Rehab  OT - OK to Discharge:  Yes

## 2019-02-27 NOTE — PROGRESS NOTES
Progress Note - Infectious Disease   Hereford Oliva 60 y o  male MRN: [id number]  Unit/Bed#: [place] 518-01 Encounter: [id number]      Impression/Plan:  1  MSSA bacteremia   Possibility of endocarditis considered in the setting of bioprosthetic AVR  [place] no evidence of valvular vegetations   Initial portal of entry may have been related to multiple upper back wounds   CT chest/abdomen/pelvis with no other evidence of acute infection   Possible from pneumonia as sputum culture was positive for MSSA   Bacteremia eventually cleared   Podiatry evaluated patient's feet and no acute issues   Neurology evaluation noted with prior changes in mental status, imaging abnormalities felt to be ischemic and similar compared to prior studies   No plan for MRI   Patient continues to hemodynamically improved after recent GI bleed      -continue IV cefazolin  -prolonged course of IV antibiotics of at least 6 weeks through 3/10  -monitor temperature/WBC  -send blood cultures if patient spikes fever  -ongoing monitoring in the ICU  -Ancef re-dosed now the patient is off CRRT     2  Rash:  Patient recently developed rash over this past week  It was noted to be flat and pruritic  Unclear etiology   Absolute eosinophil count slowly rising   Unclear if it is any particular medication and would question if this is a delayed response to Ancef   Patient's amiodarone was stopped given concern for his rash   Patient's rash is improving while he has been restarted/rechallenge with Ancef  Suspected was due to amiodarone  Patient has significant eosinophilia on labs       -continue to monitor rash  -monitor WBC  -continue to trend fever curve/vitals     3  Acute GI bleed:  Patient had an acute episode of bleeding this past weekend  [facility] was transferred to the ICU and had emergent endoscopy by GI  He has clinically improved and is off pressors and extubated    He is also noted with ischemic colitis on scope      -ongoing follow-up by GI  -ongoing supportive care as per ICU  -continue antibiotics as above for now  -additional lab monitoring as per ICU       4  History of bioprosthetic AVR   Secondary to degenerative AS   Placed in 2014  Keisha Kelly no evidence of endocarditis   Ongoing follow-up by Cardiology      5  Acute kidney injury   Likely ischemic/septic ATN    Patient remains on hemodialysis   He is status post PermCath placement      -ongoing follow-up by Nephrology  -antibiotics re-dose per intermittent HD      6  Shock   This is most likely secondary to GI bleed and hemorrhagic shock   Clinically, this is not septic shock   Patient has improved and is off pressors      -antibiotic plan as in above     7  Leukocytosis:  WBC  elevated   Patient continues to improve clinically        -continue to monitor fever curve/vitals  -repeat CBC tomorrow  -continue antibiotics as above     Above plan discussed in detail with patient      ID consult service will continue to follow  Antibiotics:  Ancef    24 hour events:  No acute events noted overnight on chart review  Patient is currently afebrile  White blood cell count 15 3  Patient with ongoing tachycardia  Absolute eosinophilia noted    Subjective:  Patient seen at bedside in ICU and currently denies having any nausea, vomiting, chest pain or shortness of breath  He reports that he is hungry  He denies having any pain  Objective:  Vitals:  Temp:  [98 °F (36 7 °C)-99 °F (37 2 °C)] 98 5 °F (36 9 °C)  HR:  [] 118  Resp:  [14-44] 22  BP: (99)/(58) 99/58  SpO2:  [94 %-100 %] 98 %  Temp (24hrs), Av 7 °F (37 1 °C), Min:98 °F (36 7 °C), Max:99 °F (37 2 °C)  Current: Temperature: 98 5 °F (36 9 °C)    Physical Exam:   General Appearance:  Alert, interactive, nontoxic, no acute distress  Throat: Oropharynx moist without lesions      Lungs:   Clear to auscultation anteriorly bilaterally; no wheezes, rhonchi or rales; respirations unlabored on room air   Heart:  Irregular; no murmur, rub or gallop appreciated   Abdomen:   Soft, non-tender, non-distended, positive bowel sounds  Extremities: No clubbing, cyanosis; patient has anasarca all of his extremities  Skin: No new rashes or lesions on exposed skin  No new draining wounds noted on exposed skin  His previously noted rash has largely dissipated the patient denies having any pruritus  Labs, Imaging, & Other studies:   All pertinent labs and imaging studies were personally reviewed  Results from last 7 days   Lab Units 02/27/19  0602 02/26/19  1829 02/26/19  1213 02/26/19  0632  02/25/19  0530   WBC Thousand/uL 15 35*  --   --  11 00*  --  12 51*   HEMOGLOBIN g/dL 8 2* 9 0* 7 7* 7 6*   < > 8 2*   PLATELETS Thousands/uL 131*  --   --  81*  --  92*    < > = values in this interval not displayed  Results from last 7 days   Lab Units 02/27/19  0602  02/22/19  0137 02/21/19  2243 02/21/19  2120   POTASSIUM mmol/L 3 9   < > 3 9  --  4 1   CHLORIDE mmol/L 108   < > 100  --  100   CO2 mmol/L 24   < > 23  --  25   CO2, I-STAT mmol/L  --   --   --  26  --    BUN mg/dL 8   < > 77*  --  74*   CREATININE mg/dL 1 21   < > 4 93*  --  5 16*   EGFR ml/min/1 73sq m 65   < > 12  --  11   GLUCOSE, ISTAT mg/dl  --   --   --  117  --    CALCIUM mg/dL 8 2*   < > 7 0*  --  7 3*   AST U/L  --   --  12  --  12   ALT U/L  --   --  8*  --  <6*   ALK PHOS U/L  --   --  54  --  57    < > = values in this interval not displayed

## 2019-02-28 ENCOUNTER — APPOINTMENT (INPATIENT)
Dept: DIALYSIS | Facility: HOSPITAL | Age: 59
DRG: 871 | End: 2019-02-28
Payer: COMMERCIAL

## 2019-02-28 ENCOUNTER — APPOINTMENT (INPATIENT)
Dept: NON INVASIVE DIAGNOSTICS | Facility: HOSPITAL | Age: 59
DRG: 871 | End: 2019-02-28
Payer: COMMERCIAL

## 2019-02-28 PROBLEM — Z96.1 PRESENCE OF INTRAOCULAR LENS: Status: ACTIVE | Noted: 2019-02-28

## 2019-02-28 PROBLEM — H25.13 AGE-RELATED NUCLEAR CATARACT, BILATERAL: Status: ACTIVE | Noted: 2019-02-28

## 2019-02-28 PROBLEM — M79.89 LEFT ARM SWELLING: Status: ACTIVE | Noted: 2019-02-28

## 2019-02-28 PROBLEM — T14.8XXA DEEP TISSUE INJURY: Status: ACTIVE | Noted: 2019-02-28

## 2019-02-28 PROBLEM — I48.19 PERSISTENT ATRIAL FIBRILLATION (HCC): Status: ACTIVE | Noted: 2019-01-24

## 2019-02-28 PROBLEM — H43.813 VITREOUS DEGENERATION OF BOTH EYES: Status: ACTIVE | Noted: 2019-02-28

## 2019-02-28 LAB
ANION GAP SERPL CALCULATED.3IONS-SCNC: 8 MMOL/L (ref 4–13)
BUN SERPL-MCNC: 16 MG/DL (ref 5–25)
CALCIUM SERPL-MCNC: 7.7 MG/DL (ref 8.3–10.1)
CHLORIDE SERPL-SCNC: 110 MMOL/L (ref 100–108)
CO2 SERPL-SCNC: 24 MMOL/L (ref 21–32)
CREAT SERPL-MCNC: 2.18 MG/DL (ref 0.6–1.3)
ERYTHROCYTE [DISTWIDTH] IN BLOOD BY AUTOMATED COUNT: 16.3 % (ref 11.6–15.1)
GFR SERPL CREATININE-BSD FRML MDRD: 32 ML/MIN/1.73SQ M
GLUCOSE SERPL-MCNC: 91 MG/DL (ref 65–140)
HCT VFR BLD AUTO: 26.8 % (ref 36.5–49.3)
HGB BLD-MCNC: 8 G/DL (ref 12–17)
MAGNESIUM SERPL-MCNC: 2.2 MG/DL (ref 1.6–2.6)
MCH RBC QN AUTO: 29.7 PG (ref 26.8–34.3)
MCHC RBC AUTO-ENTMCNC: 29.9 G/DL (ref 31.4–37.4)
MCV RBC AUTO: 100 FL (ref 82–98)
PHOSPHATE SERPL-MCNC: 2.8 MG/DL (ref 2.7–4.5)
PLATELET # BLD AUTO: 134 THOUSANDS/UL (ref 149–390)
PMV BLD AUTO: 11 FL (ref 8.9–12.7)
POTASSIUM SERPL-SCNC: 3.2 MMOL/L (ref 3.5–5.3)
RBC # BLD AUTO: 2.69 MILLION/UL (ref 3.88–5.62)
SODIUM SERPL-SCNC: 142 MMOL/L (ref 136–145)
WBC # BLD AUTO: 13.55 THOUSAND/UL (ref 4.31–10.16)

## 2019-02-28 PROCEDURE — 93005 ELECTROCARDIOGRAM TRACING: CPT

## 2019-02-28 PROCEDURE — 99232 SBSQ HOSP IP/OBS MODERATE 35: CPT | Performed by: INTERNAL MEDICINE

## 2019-02-28 PROCEDURE — 85027 COMPLETE CBC AUTOMATED: CPT | Performed by: NURSE PRACTITIONER

## 2019-02-28 PROCEDURE — 80048 BASIC METABOLIC PNL TOTAL CA: CPT | Performed by: NURSE PRACTITIONER

## 2019-02-28 PROCEDURE — 90935 HEMODIALYSIS ONE EVALUATION: CPT | Performed by: INTERNAL MEDICINE

## 2019-02-28 PROCEDURE — 84100 ASSAY OF PHOSPHORUS: CPT | Performed by: NURSE PRACTITIONER

## 2019-02-28 PROCEDURE — 83735 ASSAY OF MAGNESIUM: CPT | Performed by: NURSE PRACTITIONER

## 2019-02-28 PROCEDURE — 99231 SBSQ HOSP IP/OBS SF/LOW 25: CPT | Performed by: INTERNAL MEDICINE

## 2019-02-28 PROCEDURE — 94762 N-INVAS EAR/PLS OXIMTRY CONT: CPT

## 2019-02-28 RX ORDER — METOPROLOL TARTRATE 5 MG/5ML
5 INJECTION INTRAVENOUS EVERY 6 HOURS PRN
Status: DISCONTINUED | OUTPATIENT
Start: 2019-02-28 | End: 2019-03-16 | Stop reason: HOSPADM

## 2019-02-28 RX ORDER — FUROSEMIDE 10 MG/ML
80 INJECTION INTRAMUSCULAR; INTRAVENOUS
Status: DISCONTINUED | OUTPATIENT
Start: 2019-02-28 | End: 2019-03-04

## 2019-02-28 RX ORDER — ALBUMIN (HUMAN) 12.5 G/50ML
12.5 SOLUTION INTRAVENOUS ONCE
Status: DISCONTINUED | OUTPATIENT
Start: 2019-02-28 | End: 2019-02-28

## 2019-02-28 RX ORDER — METOPROLOL TARTRATE 5 MG/5ML
5 INJECTION INTRAVENOUS ONCE
Status: DISCONTINUED | OUTPATIENT
Start: 2019-02-28 | End: 2019-02-28

## 2019-02-28 RX ORDER — METOPROLOL TARTRATE 50 MG/1
100 TABLET, FILM COATED ORAL EVERY 12 HOURS SCHEDULED
Status: DISCONTINUED | OUTPATIENT
Start: 2019-02-28 | End: 2019-03-02

## 2019-02-28 RX ADMIN — ACETAMINOPHEN 650 MG: 325 TABLET, FILM COATED ORAL at 16:40

## 2019-02-28 RX ADMIN — ATORVASTATIN CALCIUM 40 MG: 40 TABLET, FILM COATED ORAL at 18:16

## 2019-02-28 RX ADMIN — HYDROCORTISONE: 1 CREAM TOPICAL at 08:55

## 2019-02-28 RX ADMIN — SUCRALFATE 1000 MG: 1 SUSPENSION ORAL at 18:16

## 2019-02-28 RX ADMIN — FUROSEMIDE 80 MG: 10 INJECTION, SOLUTION INTRAMUSCULAR; INTRAVENOUS at 18:15

## 2019-02-28 RX ADMIN — HYDROCORTISONE: 1 CREAM TOPICAL at 18:16

## 2019-02-28 RX ADMIN — NYSTATIN: 100000 POWDER TOPICAL at 08:55

## 2019-02-28 RX ADMIN — SUCRALFATE 1000 MG: 1 SUSPENSION ORAL at 06:42

## 2019-02-28 RX ADMIN — Medication 20 MG: at 08:54

## 2019-02-28 RX ADMIN — Medication 20 MG: at 18:14

## 2019-02-28 RX ADMIN — METOPROLOL TARTRATE 100 MG: 50 TABLET, FILM COATED ORAL at 08:54

## 2019-02-28 RX ADMIN — NYSTATIN: 100000 POWDER TOPICAL at 18:16

## 2019-02-28 RX ADMIN — CEFAZOLIN SODIUM 1000 MG: 10 INJECTION, POWDER, FOR SOLUTION INTRAVENOUS at 18:15

## 2019-02-28 RX ADMIN — MELATONIN 6 MG: at 21:09

## 2019-02-28 RX ADMIN — IRON SUCROSE 100 MG: 20 INJECTION, SOLUTION INTRAVENOUS at 08:54

## 2019-02-28 RX ADMIN — METOPROLOL TARTRATE 100 MG: 50 TABLET, FILM COATED ORAL at 18:15

## 2019-02-28 NOTE — SPEECH THERAPY NOTE
Patient off the floor for dialysis  Will f/u and assess swallow function and diet tolerance when able

## 2019-02-28 NOTE — PROGRESS NOTES
Progress Note - Janelle Kim 1960, 60 y o  male MRN: [id number]    Unit/Bed#: [facility] 805-01 Encounter: [id number]    Primary Care Provider: Ronaldo Venegas DO   Date and time admitted to hospital: 1/24/2019  2:42 PM        Deep tissue injury  Assessment & Plan  First toe on the left  Appreciated Podiatry input, improving over time, continue with clinical monitoring  No further podiatry needs while in the hospital    Left arm swelling  Assessment & Plan  Extensive bruising and swelling of the left upper extremity, will obtain vascular Doppler to rule out DVT    Hypovolemic shock [facility])  Assessment & Plan  Resolved    Acute on chronic renal failure [facility])  Assessment & Plan  Present on admission, ongoing  Patient required CC RT, currently on hemodialysis Tuesday, Thursday, Saturday  Nephrology continues to follow  Right PermCath in place  He has already dialysis set up as an outpatient once stable for discharge to rehabilitation    Skin rash  Assessment & Plan  Likely related to amiodarone, resolved    Toxic metabolic encephalopathy  Assessment & Plan  Resolved    Cerebrovascular accident [facility])  [address] [place] [address]  Currently only on atorvastatin, antiplatelets on hold secondary to recent GI bleed  Will discussed with GI time to resumption    Acute blood loss anemia  Assessment & Plan  Secondary to upper GI bleed, please refer to principal plan    Thrombocytopenia ([facility] [address] )  Assessment & Plan  Resolving, will continue to monitor, last count 134    MSSA bacteremia  Assessment & Plan  Remains on IV cefazolin as per Infectious Disease recommendation, last dose of antibiotics March 10  MSSA bacteremia was present on admission at the beginning of hospital stay, he underwent extensive workup, including transesophageal echocardiogram without evidence of endocarditis  Infectious Disease continues to follow    Persistent atrial fibrillation [facility])  Assessment & Plan  With episode of atrial flutter while in the ICU  He was started on amiodarone which was discontinued secondary to drug rash  He remains on beta-blocker  Not a candidate for digoxin secondary to acute kidney injury  No anticoagulation at this time secondary to recent GI bleed  Cardiology continues to follow    Acute respiratory failure with hypoxia [facility])  Assessment & Plan  Resolved, currently off oxygen  Likely multifactorial secondary to hemorrhagic shock, acute kidney injury requiring dialysis, MSSA bacteremia, obesity, prolonged immobilization  Will continue to monitor closely    Stress-induced cardiomyopathy  Assessment & Plan  Volume controlled with hemodialysis, remains a beta-blocker, cardiology continues to follow    * GI bleed  Assessment & Plan  Patient transfer from ICU on February 27  GI bleed with hypovolemic/hemorrhagic shock requiring also intubation with extubation on February 26  He underwent EGD on February 22nd positive for large gastric ulcer which was cauterized, injected with epinephrine, and also clip was applied  He underwent another endoscopy without intervention on February 23 and again on February 25 without new signs of bleeding  He underwent colonoscopy on February 25th revealing small area of ischemic colitis and he underwent also colonic decompression at that time  He will remain on b i d  PPI for 8 weeks  He is status post transfusion with now hemoglobin stable around 8  GI has signed off the case, to be reconsulted p r n  If any issues    VTE Pharmacologic Prophylaxis: no  Pharmacologic: Recent GI bleed  Mechanical VTE Prophylaxis in Place: Yes    Patient Centered Rounds: I have performed bedside rounds with nursing staff today  Discussions with Specialists or Other Care Team Provider:  Nephrology    Education and Discussions with Family / Patient:  Patient, wife    Time Spent for Care: 1 hour  More than 50% of total time spent on counseling and coordination of care as described above      Current Length of Stay: 35 day(s)    Current Patient Status: Inpatient   Certification Statement: The patient will continue to require additional inpatient hospital stay due to Please refer to above    Discharge Plan: To be determined    Code Status: Level 1 - Full Code      Subjective:   Feels much better today, complains of left upper extremity swelling and pain    Objective:     Vitals:   Temp (24hrs), Av 2 °F (36 8 °C), Min:97 8 °F (36 6 °C), Max:98 5 °F (36 9 °C)    Temp:  [97 8 °F (36 6 °C)-98 5 °F (36 9 °C)] 98 4 °F (36 9 °C)  HR:  [] 74  Resp:  [18-29] 28  BP: (115-138)/(58-82) 115/61  SpO2:  [92 %-97 %] 92 %  Body mass index is 52 74 kg/m²  Input and Output Summary (last 24 hours): Intake/Output Summary (Last 24 hours) at 2019 1338  Last data filed at 2019 0836  Gross per 24 hour   Intake 240 ml   Output 0 ml   Net 240 ml       Physical Exam:     Physical Exam   Constitutional: He is oriented to person, place, and time  He appears well-developed  Cardiovascular: Normal rate, regular rhythm and normal heart sounds  Exam reveals no friction rub  No murmur heard  Pulmonary/Chest: Effort normal and breath sounds normal    Anterior auscultation secondary to limited mobility, grossly without abnormalities   Abdominal: Soft  Bowel sounds are normal  He exhibits no distension  There is no tenderness  Musculoskeletal: He exhibits edema (Plus one edema right upper extremity, +3 left upper extremity, +2 bilateral lower extremities)  Neurological: He is alert and oriented to person, place, and time  Skin:   Diffuse bilateral bruises of the upper extremities   Psychiatric: He has a normal mood and affect   Judgment and thought content normal        Additional Data:     Labs:    Results from last 7 days   Lab Units 19  0511 19  0602   WBC Thousand/uL 13 55* 15 35*   HEMOGLOBIN g/dL 8 0* 8 2*   HEMATOCRIT % 26 8* 26 6*   PLATELETS Thousands/uL 134* 131*   NEUTROS PCT %  --  58   LYMPHS PCT %  --  6*   MONOS PCT %  --  7 EOS PCT %  --  15*     Results from last 7 days   Lab Units 02/28/19  0511  02/22/19  0137 02/21/19  2243   POTASSIUM mmol/L 3 2*   < > 3 9  --    CHLORIDE mmol/L 110*   < > 100  --    CO2 mmol/L 24   < > 23  --    CO2, I-STAT mmol/L  --   --   --  26   BUN mg/dL 16   < > 77*  --    CREATININE mg/dL 2 18*   < > 4 93*  --    CALCIUM mg/dL 7 7*   < > 7 0*  --    ALK PHOS U/L  --   --  54  --    ALT U/L  --   --  8*  --    AST U/L  --   --  12  --    GLUCOSE, ISTAT mg/dl  --   --   --  117    < > = values in this interval not displayed  Results from last 7 days   Lab Units 02/24/19  0559   INR  1 26*       * I Have Reviewed All Lab Data Listed Above  * Additional Pertinent Lab Tests Reviewed: All Labs Within Last 24 Hours Reviewed    Imaging:    Imaging Reports Reviewed Today Include:  Recent images  Imaging Personally Reviewed by Myself Includes:  None    Recent Cultures (last 7 days):           Last 24 Hours Medication List:     Current Facility-Administered Medications:  acetaminophen 650 mg Oral Q6H PRN [place] [facility], CRNP    atorvastatin 40 mg Oral Daily With Delbert Fox, CRNP    bisacodyl 10 mg Rectal Daily PRN [facility], CRNP    cefazolin 1,000 mg Intravenous Q24H [facility], CRNP Last Rate: 1,000 mg (02/27/19 1830)   hydrocortisone  Topical BID [facility], CRNP    melatonin 6 mg Oral HS Leidy Martinez PA-C    metoprolol tartrate 100 mg Oral Q12H [address] Kristal Bond, CRNP    nystatin  Topical BID [facility], CRNP    omeprazole (PRILOSEC) suspension 2 mg/mL 20 mg Oral BID AC Kristal Bond, CRNP    polyvinyl alcohol 1 drop Both Eyes Q3H PRN [facility], CRNP    sucralfate 1,000 mg Oral BID AC [place] [facility], CRNP         Today, Patient Was Seen By: Sita Chavez MD    ** Please Note: Dragon 360 Dictation voice to text software may have been used in the creation of this document   **

## 2019-02-28 NOTE — OCCUPATIONAL THERAPY NOTE
Occupational Therapy Cancel Note    Chart reviewed, pt currently off floor at dialysis and not available for OT tx at this time  Continue to see pt as able      Gemma Standing

## 2019-02-28 NOTE — ASSESSMENT & PLAN NOTE
Patient transfer from ICU on February 27  GI bleed with hypovolemic/hemorrhagic shock requiring also intubation with extubation on February 26  He underwent EGD on February 22nd positive for large gastric ulcer which was cauterized, injected with epinephrine, and also clip was applied  He underwent another endoscopy without intervention on February 23 and again on February 25 without new signs of bleeding  He underwent colonoscopy on February 25th revealing small area of ischemic colitis and he underwent also colonic decompression at that time  He will remain on b i d  PPI for 8 weeks  He is status post transfusion with now hemoglobin stable around 8  GI has signed off the case, to be reconsulted p r n   If any issues

## 2019-02-28 NOTE — ASSESSMENT & PLAN NOTE
Present on admission, ongoing  Patient required CC RT, currently on hemodialysis Tuesday, Thursday, Saturday  Nephrology continues to follow  Right PermCath in place  He has already dialysis set up as an outpatient once stable for discharge to rehabilitation

## 2019-02-28 NOTE — SOCIAL WORK
Cm reviewed patient during care coordination rounds with Dr Margot Pavon  Patient not stable for discharge today  Patient anticipated for possible discharge Sunday pending medical clearance  Patient presented with GI bleed  Patient is new start HD and is established with [facility] [place] [facility] 10:45AM   Patient was accepted to [facility] and will need BLS arranged for HD transfers  Cm following and provided [place] with anticipated discharge date

## 2019-02-28 NOTE — PROGRESS NOTES
Follow up Consultation    Nephrology   Lilia Pace 60 y o  male MRN: [id number]  Unit/Bed#: [facility] 805-01 Encounter: [id number]      Physician Requesting Consult: Jovon Gomez MD  Reason for Consult:  Evaluation and management of acute kidney injury       ASSESSMENT/PLAN:  43-year-old male past medical history of ischemic cardiomyopathy EF of 25% CKD stage 3 admitted with altered mental status and encephalopathy  Patient with GI bleed  Nephrology following for acute kidney injury  1)Acute kidney injury (POA):  - PATRICK most likely secondary to ischemic injury + questionable immune complex GN (MSSA bacteremia) now likely with ATN  - patient needing dialysis for his acute kidney injury  Was on CVVHD  - clinically patient appears to be hypervolemic and hemodynamically stable  - has tunneled dialysis catheter in place  - After review of records it appears that the patient has a baseline Creatinine of 0 9mg/dL  - patient was admitted with a creatinine of 2 63 mg/dL  - peak creatinine during this admission thus far was 7 74 mg/dL on February 13, 2019   - patient's creatinine today is at 2 18 mg/dL  - patient was last on CVVH on February 27, 2019  - Avoid nephrotoxins, adjust meds to appropriate GFR   - Acid base and lytes stable except hypokalemia  - Await renal recovery  - BMP, magnesium, phosphorus daily  - doing dialysis today  Goal UF of at least 2-1/2 to 3 kilos as tolerated  For now will keep on a Tuesday Thursday Saturday schedule while monitoring for renal recovery  - Place on a renal diet when allowed diet order    - Strict I/O  - will place on Lasix 80 mg IV b i d     2)Blood pressure:  - Optimize hemodynamics   - Maintain MAP > 65mmHg  - Avoid BP fluctuations    - currently off of pressors    3)H/H / anemia:  - of low hemoglobin likely secondary to GI bleed  - most recent hemoglobin at 8 0 grams/deciliter  - Management as per primary team   - recommend hold IV iron due to infection    4)Volume status:  - Clinically patient appears to be hypervolemic   - aim for UF of 2 5-3 kilos with dialysis today    5)Hypokalemia:  - to be corrected with dialysis  - Likely due to GI losses    6)Hypophosphatemia:  - Continue to monitor for now  - stable for now    7) AFib:  - management as per Primary team  - anticoagulation held due to GI bleed  - on metoprolol 25 mg p o  B i d     8) Ischemic cardiomyopathy with EF of 30%:  - Management as per primary team    9) acute hypoxic respiratory failure:  - on nasal cannula extubated  - management as per primary team    10) MSSA bacteremia:  - follow-up with ID  - on Ancef would need antibiotics until March 10, 2019  - significant eosinophilia  Thought to be initially secondary to amiodarone which was stopped patient back on Ancef  Reconsider use of Ancef  Is also now on Protonix  Thanks for the consult  Will continue to follow  Please call with questions/ concerns  Plan was discussed with the team in Maty E Foreign Lee MD, [facility], 2019, 1:43 PM              Objective :   Patient seen and examined in his room earlier and then again while on dialysis At 2:10 p m  Hemodynamically stable remains afebrile no adequate urine output documentation  However patient does report that he did have some urine output  PHYSICAL EXAM  /61 (BP Location: Right arm)   Pulse 74   Temp 98 4 °F (36 9 °C) (Axillary)   Resp (!) 28   Ht 5' 9" (1 753 m)   Wt (!) 162 kg (357 lb 2 3 oz)   SpO2 92%   BMI 52 74 kg/m²   Temp (24hrs), Av 2 °F (36 8 °C), Min:97 8 °F (36 6 °C), Max:98 5 °F (36 9 °C)        Intake/Output Summary (Last 24 hours) at 2019 1343  Last data filed at 2019 0836  Gross per 24 hour   Intake 240 ml   Output 0 ml   Net 240 ml       I/O last 24 hours:   In: 240 [P O :240]  Out: 100 [Stool:100]      Current Weight: Weight - Scale: (!) 162 kg (357 lb 2 3 oz)  First Weight: Weight - Scale: (!) 145 kg (319 lb 3 6 oz)  Physical Exam Constitutional: He is oriented to person, place, and time  He appears well-developed and well-nourished  No distress  HENT:   Head: Normocephalic and atraumatic  Mouth/Throat: Oropharynx is clear and moist  No oropharyngeal exudate  Eyes: No scleral icterus  Neck: Neck supple  No JVD present  No tracheal deviation present  Cardiovascular: Regular rhythm and normal heart sounds  Exam reveals no friction rub  No murmur heard  Pulmonary/Chest: He has no wheezes  Course breath sounds   Abdominal: Soft  He exhibits no mass  There is no tenderness  There is no rebound  Plus one edema   Musculoskeletal: He exhibits edema  Plus two edema bilateral upper lower extremities   Lymphadenopathy:     He has no cervical adenopathy  Neurological: He is alert and oriented to person, place, and time  Skin: Skin is warm  He is not diaphoretic  Psychiatric: He has a normal mood and affect  Nursing note and vitals reviewed  Review of Systems   Constitutional: Positive for fatigue  Negative for fever  HENT: Negative for congestion and sore throat  Respiratory: Negative for cough, shortness of breath and wheezing  Gastrointestinal: Negative for abdominal pain, constipation, diarrhea, nausea and vomiting  Genitourinary: Negative for difficulty urinating and flank pain  Musculoskeletal: Negative for back pain  Skin: Negative for rash  Neurological: Negative for dizziness and headaches  Psychiatric/Behavioral: Negative for agitation and confusion  All other systems reviewed and are negative        Scheduled Meds:    Current Facility-Administered Medications:  acetaminophen 650 mg Oral Q6H PRN KATHY Maza    atorvastatin 40 mg Oral Daily With KATHY Baker    bisacodyl 10 mg Rectal Daily PRN Konstantineayeyoa KATHY Acharya    cefazolin 1,000 mg Intravenous Q24H KATHY Maza Last Rate: 1,000 mg (02/27/19 1830)   hydrocortisone  Topical BID KATHY Lugo    melatonin 6 mg Oral HS Leidy Martinez PA-C    metoprolol tartrate 100 mg Oral Q12H [facility] KATHY Lugo    nystatin  Topical BID KATHY Crawford    omeprazole (PRILOSEC) suspension 2 mg/mL 20 mg Oral BID AC KATHY Lugo    polyvinyl alcohol 1 drop Both Eyes Q3H PRN KATHY Crawford    sucralfate 1,000 mg Oral BID KATHY García        PRN Meds:   acetaminophen    bisacodyl    polyvinyl alcohol    Continuous Infusions:       Invasive Devices:      Invasive Devices     Hemodialysis Catheter            Permanent HD Catheter  10 days          Drain            Rectal Tube With balloon 1 day                  LABORATORY:    Results from last 7 days   Lab Units 02/28/19  0511 02/27/19  0602 02/26/19  1829 02/26/19  1213 02/26/19  6705 02/26/19  0530 02/25/19  2340 02/25/19  1824  02/25/19  0530  02/24/19  0559  02/23/19  1838 02/23/19  1223 02/23/19  0550  02/21/19  2243  02/21/19  2040   WBC Thousand/uL 13 55* 15 35*  --   --  11 00*  --   --   --   --  12 51*  --  15 57*  --  15 22*  --  18 90*   < >  --    < >  --    HEMOGLOBIN g/dL 8 0* 8 2* 9 0* 7 7* 7 6* 7 9* 8 3* 8 6*   < > 8 2*   < > 8 3*   < > 6 8* 6 5* 7 1*   < >  --    < >  --    I STAT HEMOGLOBIN g/dl  --   --   --   --   --   --   --   --   --   --   --   --   --   --   --   --   --  7 1*  --  5 8*   HEMATOCRIT % 26 8* 26 6*  --   --  24 3*  --   --   --   --  25 6*  --  25 5*  --  20 5* 20 1* 21 2*   < >  --    < >  --    HEMATOCRIT, ISTAT %  --   --   --   --   --   --   --   --   --   --   --   --   --   --   --   --   --  21*  --  17*   PLATELETS Thousands/uL 134* 131*  --   --  81*  --   --   --   --  92*  --  86*  --  97*  --  82*   < >  --    < >  --    POTASSIUM mmol/L 3 2* 3 9 4 4 3 9  --  3 9 3 9 4 1   < > 3 6   < > 4 1   < >  --  4 1 3 9  3 9   < >  --    < >  --    CHLORIDE mmol/L 110* 108 108 108  --  108 107 107   < > 107   < > 108   < >  --  108 106  107   < >  --    < >  --    CO2 mmol/L 24 24 26 27  --  26 26 25   < > 25   < > 24   < > --  23 22  23   < >  --    < >  --    CO2, I-STAT mmol/L  --   --   --   --   --   --   --   --   --   --   --   --   --   --   --   --   --  26  --  25   BUN mg/dL 16 8 6 6  --  7 9 10   < > 13   < > 28*   < >  --  40* 46*  48*   < >  --    < >  --    CREATININE mg/dL 2 18* 1 21 0 86 0 76  --  0 88 0 90 1 01   < > 1 14   < > 2 10*   < >  --  2 57* 2 94*  2 88*   < >  --    < >  --    CALCIUM mg/dL 7 7* 8 2* 8 3 7 9*  --  8 1* 7 9* 7 9*   < > 8 0*   < > 8 1*   < >  --  7 7* 7 7*  8 0*   < >  --    < >  --    MAGNESIUM mg/dL 2 2 2 1 2 0 1 9  --  2 0 1 9 2 1   < > 2 0   < > 2 1   < >  --  2 1 2 1  2 1   < >  --    < >  --    PHOSPHORUS mg/dL 2 8 2 4* 1 6* 1 7*  --  1 7* 2 3* 1 9*   < > 1 9*   < > 2 4*   < >  --  2 1* 2 2*  2 0*   < >  --    < >  --    GLUCOSE, ISTAT mg/dl  --   --   --   --   --   --   --   --   --   --   --   --   --   --   --   --   --  117  --  115    < > = values in this interval not displayed  rest all reviewed    RADIOLOGY:  XR chest portable   Final Result by Che Amin MD (02/22 1445)      Endotracheal tube in appropriate position  Cardiomegaly with stable mild pulmonary vascular congestion  Workstation performed: [id number]         CTA abdomen pelvis w wo contrast   Final Result by Farhat Del Angel MD (02/22 2706)      1  Small foci of hyperdensity within the gastric antrum/pylorus area concerning for primary site of active hemorrhage given history of melanoma  In addition, there is a small focus of increased density within the rectum where additional active    hemorrhage is not excluded  2   Extensive distention of the entire colon with liquid stool and gas with loss of haustra within the descending colon and rectum  There are areas of pneumatosis seen within the splenic flexure and sigmoid colon  Correlate clinically for C  difficile    colitis, findings may be seen in the setting of toxic megacolon     3   Retroperitoneal stranding along the inferior iliopsoas muscle on the left, correlate for recent instrumentation  Underlying vasculature remains patent  I personally discussed the location of bleeding with Dr Farhat Gupta on 2/22/2019 at 4:35 AM    I personally discussed the possibility for C  difficile colitis and pneumatosis with Jewel Briscoe on 2/22/2019 at 4:43 AM                   Workstation performed: [id number]         XR chest portable   Final Result by Karon Holter, MD (02/22 0850)   Tubes and lines as above without pneumothorax  Mild central congestion and left trace basilar effusion  Workstation performed: [id number]         US abdomen limited   Final Result by Araceli Villela MD (02/19 1813)   No ascites  Workstation performed: [id number]         IR permacath placement   Final Result by Florinda Sanabria MD (02/18 1150)   Impression:    Successful placement of tunneled hemodialysis catheter through right IJV access  Workstation performed: [id number]         VAS carotid complete study   Final Result by Huma Stringer MD (02/14 1446)      CT head wo contrast   Final Result by Lizabeth Patel MD (02/10 1534)   Interval development of subtle areas of decreased attenuation along the gray matter white matter interface in the bilateral parieto-occipital regions  Differential considerations include infectious etiologies such as abscess or septic    emboli, metastatic disease or ischemia  No intracranial hemorrhage or significant mass effect  Further characterization with brain MRI should be considered  The study was marked in [facility] for immediate notification  Workstation performed: [id number]         XR chest portable   Final Result by Addy Redd DO (02/08 6737)      Moderate pulmonary vascular congestion with small left pleural effusion  Stable cardiomegaly  No pneumothorax              Workstation performed: [id number]         XR chest portable   Final Result by Jai Jasso DO (02/05 0809)      Stable pulmonary edema with left basilar subsegmental atelectasis and possible effusion  Workstation performed: [id number]         XR chest portable   Final Result by Gloria Narayanan MD (02/04 1637)      Stable congestion likely on the basis of heart failure with left basilar opacity, likely atelectasis and pleural fluid  Workstation performed: [id number]         IR permacath placement   Final Result by Tracey Akins MD (02/04 5039)   Impression:    1  Successful image guided placement of right internal jugular tunneled central venous hemodialysis catheter   2  Successful image guided exchange of left internal jugular central venous catheter         Workstation performed: [id number]         XR chest portable   Final Result by Holley Luu DO (01/30 0536)   Slight progression of congestive heart failure  Fluid and/or infiltrate left lower lobe  Workstation performed: [id number]         XR chest portable   Final Result by Gloria Narayanan MD (01/27 9596)      Cardiomegaly with stable vascular congestion and mildly increased left basilar opacity, likely atelectasis/pleural effusion  Workstation performed: [id number]         XR chest portable   Final Result by Adeline Orozco MD (01/25 9223)      Expected positioning of endotracheal and enteric tubes  A right internal jugular central venous catheter has been changed since previous examination with a new one being larger in caliber  No pneumothorax  Workstation performed: [id number]         IR central line placement    (Results Pending)   VAS upper limb venous duplex scan, unilateral/limited    (Results Pending)     Rest all reviewed    Portions of the record may have been created with voice recognition software  Occasional wrong word or "sound a like" substitutions may have occurred due to the inherent limitations of voice recognition software   Read the chart carefully and recognize, using context, where substitutions have occurred  If you have any questions, please contact the dictating provider

## 2019-02-28 NOTE — SOCIAL WORK
Late entry for 2/28/19: Call received from Kvng Morrissey at [facility] that she will need updates on patient to review referral since it has been so long that original referral  Was made  Call received from Vlad Fay at Health system, [phone]  Patient has been approved for HD schedule TTS 10:45 in a bed  Gave her update and CM will inform her of anticipated dc date

## 2019-02-28 NOTE — NUTRITION
02/28/19 1318   Recommendations/Interventions   Summary Patient's appetite remains fair on current diet  Patient is agreeable to honeyshake supplements  Generalized +3 edema noted, dialysis to continue  Awaiting placement  Malnutrition type Acute illness  (Related to medical condition as evidenced by Generalized +3 edema and <75% energy intake needs met >5 days treated with Honeyshake supplements)   Degree of Malnutrition Malnutrition of mild degree   Malnutrition Characteristics Inadequate energy; Fluid accumulation   Interventions Diet: order adjustment;Supplement initiate  (Diet adjusted to 2 gm Na, Level 1 dysphagia, NTL with Honeyshake BID  )   Nutrition Recommendations Continue diet as ordered; Other (specify); Lab - consider order (specify)  (Suggest add 1000 ml daily fluid restriction to current diet order   Monitor electrolytes and phosphorus  )

## 2019-02-28 NOTE — ASSESSMENT & PLAN NOTE
With episode of atrial flutter while in the ICU  He was started on amiodarone which was discontinued secondary to drug rash  He remains on beta-blocker  Not a candidate for digoxin secondary to acute kidney injury  No anticoagulation at this time secondary to recent GI bleed  Cardiology continues to follow

## 2019-02-28 NOTE — CONSULTS
Consult - Podiatry   Ginny Bellamy 60 y o  male MRN: [id number]  Unit/Bed#: [facility] 805-01 Encounter: [id number]    Assessment/Plan     Assessment:  1) Deep Tissue Injury of left great toe  - no open wounds, toe is warm to touch, ecchymosis present on plantar distal toe, likely from lack of perfusion from cardiogenic shock    Plan:  - continue to monitor ecchymosis, there's significant improvement since last assessment will likely to continue to improve on its own, no local wound care is needed  - podiatry will sign off, thank you for the consultation, please do not hesitate to call with questions/concerns    History of Present Illness     HPI:  Ginny Bellamy is a 60 y o  male with left great toe deep tissue injury  Podiatry is reconsulted for left great toe  Patient is well known to us for current admission  Last assessment was on 02/07/2019 and at that time patient was intubated and admitted to ICU  He had deep tissue injury to mostly left great toe was some changes in the lesser digits likely due to lack of perfusion  from cardiogenic shock  Today patient was resting comfortably in bed and was able to have conversation with me  Denies any pain to his feet  Consults  Review of Systems   Constitutional: Negative  HENT: Negative  Eyes: Negative  Respiratory: Negative  Cardiovascular: Negative  Gastrointestinal: Negative  Musculoskeletal: negative   Skin:bruising on left great toe   Neurological: Negative  Psych: negative         Historical Information   Past Medical History:   Diagnosis Date    Allergic rhinitis     last assessed 9/12/12    Finger fracture, right     Closed fx of the middle phalanx of the right 5th finger  last assessed 1/30/14    Hemorrhoids     last assessed 2/10/14    Hyperlipidemia     Hypertension      Past Surgical History:   Procedure Laterality Date    AORTIC VALVE REPLACEMENT  07/30/2014    AVR with 25mm [facility] Ease bovine pericardial valve    CARDIAC CATHETERIZATION  07/18/2014    SLB left main-normal, circumflex-normal, ramus intermedius-normal, RCA was large and dominant giving rise to the PDA & a large posterolateral branch  No disease  last assessed 8/19/14     COLONOSCOPY N/A 2/25/2019    Procedure: COLONOSCOPY;  Surgeon: Fabi Martin DO;  Location: [facility]; Service: Gastroenterology    ESOPHAGOGASTRODUODENOSCOPY N/A 2/22/2019    Procedure: ESOPHAGOGASTRODUODENOSCOPY (EGD)-roadshow overnight;  Surgeon: Fabi Martin DO;  Location: [facility]; Service: Gastroenterology    ESOPHAGOGASTRODUODENOSCOPY N/A 2/23/2019    Procedure: ESOPHAGOGASTRODUODENOSCOPY (EGD); Surgeon: Nick Garza MD;  [facility]: [facility]; Service: Gastroenterology    ESOPHAGOGASTRODUODENOSCOPY N/A 2/25/2019    Procedure: ESOPHAGOGASTRODUODENOSCOPY (EGD); Surgeon: Fabi Martin DO;  Location[facility];   Service: Gastroenterology    [facility] PLACEMENT  2/4/2019    [facility] PLACEMENT  2/18/2019    KNEE CARTILAGE SURGERY Left     medial meniscus tear     TESTICLE SURGERY      TONSILLECTOMY AND ADENOIDECTOMY      TOOTH EXTRACTION       Social History   Social History     Substance and Sexual Activity   Alcohol Use Never    Frequency: Never    Comment: ocassion /never drank alcohol per [employer]      Social History     Substance and Sexual Activity   Drug Use No     Social History     Tobacco Use   Smoking Status Never Smoker   Smokeless Tobacco Never Used     Family History:   Family History   Problem Relation Age of Onset    Lung cancer Mother     Heart disease Mother         pacemaker placement     Coronary artery disease Father     Hypertension Father     Heart attack Father     Cancer Family         bladder        Meds/Allergies   Medications Prior to Admission   Medication    amLODIPine (NORVASC) 5 mg tablet    ascorbic acid (VITAMIN C) 500 MG tablet    aspirin (ECOTRIN) 325 mg EC tablet    fluticasone (FLONASE) 50 mcg/act nasal spray  loratadine (CLARITIN) 10 mg tablet    metoprolol tartrate (LOPRESSOR) 100 mg tablet    potassium chloride (K-DUR,KLOR-CON) 20 mEq tablet    pravastatin (PRAVACHOL) 40 mg tablet    torsemide (DEMADEX) 20 mg tablet     Allergies   Allergen Reactions    Amiodarone      Developed Rash    Penicillins Rash     However, has subsequently tolerated Cefazolin and Cefepime       Objective   First Vitals:   Blood Pressure: 124/72 (01/24/19 1530)  Pulse: 94 (01/24/19 1530)  Temperature: 99 °F (37 2 °C) (01/24/19 1530)  Temp Source: Rectal (01/24/19 1530)  Respirations: 20 (01/24/19 1530)  Height: 5' 9" (175 3 cm) (01/24/19 1530)  Weight - Scale: (!) 145 kg (319 lb 3 6 oz) (01/24/19 1530)  SpO2: 98 % (01/24/19 1500)    Current Vitals:   Blood Pressure: 115/61 (02/28/19 1117)  Pulse: 74 (02/28/19 1117)  Temperature: 98 4 °F (36 9 °C) (02/28/19 1117)  Temp Source: Axillary (02/28/19 1117)  Respirations: (!) 28 (02/28/19 1117)  Height: 5' 9" (175 3 cm) (01/24/19 1530)  Weight - Scale: (!) 162 kg (357 lb 2 3 oz) (02/27/19 0600)  SpO2: 92 % (02/28/19 1117)        /61 (BP Location: Right arm)   Pulse 74   Temp 98 4 °F (36 9 °C) (Axillary)   Resp (!) 28   Ht 5' 9" (1 753 m)   Wt (!) 162 kg (357 lb 2 3 oz)   SpO2 92%   BMI 52 74 kg/m²      General Appearance:    Alert, cooperative, no distress   Head:    Normocephalic, without obvious abnormality, atraumatic   Eyes:    PERRL, conjunctiva/corneas clear, EOM's intact        Nose:   Moist mucous membranes   Neck:   Supple, symmetrical, trachea midline   Back:     Symmetric   Lungs:     Respirations unlabored   Heart:    Regular rate and rhythm, S1 and S2 normal, no murmur, rub   or gallop   Abdomen:     Soft, non-tender   Extremities:   MSK function not assessed   Pulses:   Pedal pulses palpable   Skin:   Warm to touch, left great toe with ecchymosis   Neurologic:   Gross sensation is intact  Protective sensation is diminished                 Lab Results:   Admission on 01/24/2019   No results displayed because visit has over 200 results  Invalid input(s): LABAEARO            Imaging: I have personally reviewed pertinent films in PACS  EKG, Pathology, and Other Studies: I have personally reviewed pertinent reports        Code Status: Level 1 - Full Code  Advance Directive and Living Will:      Power of :    POLST:

## 2019-02-28 NOTE — ASSESSMENT & PLAN NOTE
Currently only on atorvastatin, antiplatelets on hold secondary to recent GI bleed  Will discussed with GI time to resumption

## 2019-02-28 NOTE — PROGRESS NOTES
Progress Note - Infectious Disease   Abel Almeida 60 y o  male MRN: [id number]  Unit/Bed#: [facility] 805-01 Encounter: [id number]      Impression/Plan:  1  MSSA bacteremia   Possibility of endocarditis considered in the setting of bioprosthetic AVR  Inessa Linen no evidence of valvular vegetations   Initial portal of entry may have been related to multiple upper back wounds   CT chest/abdomen/pelvis with no other evidence of acute infection   Possible from pneumonia as sputum culture was positive for MSSA   Bacteremia eventually cleared   Podiatry evaluated patient's feet and no acute issues   Neurology evaluation noted with prior changes in mental status, imaging abnormalities felt to be ischemic and similar compared to prior studies   No plan for MRI   Patient improved significantly after recent GI bleed       -continue IV cefazolin at 1 g Q 24  -prolonged course of IV antibiotics of at least 6 weeks through 3/10  -monitor temperature/WBC  -send blood cultures if patient spikes fever  -ongoing monitoring in the ICU  -can dose Ancef with HD once patient is on stable schedule     2  Rash:  Patient recently developed rash over this past week  It was noted to be flat and pruritic  Unclear etiology   Absolute eosinophil count slowly rising   Unclear if it is any particular medication and would question if this is a delayed response to Ancef   Patient's amiodarone was stopped given concern for his rash   Patient's rash is improving while he has been restarted/rechallenge with Ancef   Suspected was due to amiodarone  Patient has significant eosinophilia on labs       -continue to monitor rash  -monitor WBC  -continue to trend fever curve/vitals     3  Acute GI bleed:  Patient had an acute episode of bleeding this past weekend  Alfredo Elias was transferred to the ICU and had emergent endoscopy by Leon Crespo has clinically improved and is off pressors and extubated  Alfredo Elias is also noted with ischemic colitis on scope      -continue antibiotics as above for now  -ongoing care as per primary     4  History of bioprosthetic AVR   Secondary to degenerative AS   Placed in 2014  Mily Winkler no evidence of endocarditis   Ongoing follow-up by Cardiology      5  Acute kidney injury   Likely ischemic/septic ATN    Patient remains on hemodialysis   He is status post PermCath placement      -ongoing follow-up by Nephrology  -antibiotics re-dose for intermittent HD      6  Shock   This is most likely secondary to GI bleed and hemorrhagic shock   Clinically, this is not septic shock   Patient has improved and is off pressors      -antibiotic plan as in above     7  Leukocytosis:  WBC  elevated   Patient continues to improve clinically        -continue to monitor fever curve/vitals  -repeat CBC tomorrow  -continue antibiotics as above     Above plan discussed in detail with patient      ID consult service will continue to follow  Antibiotics:  Ancef    24 hour events:  No acute events noted overnight on chart review  White blood cell count 13 5  Patient is currently afebrile  Patient's other vitals are stable    Subjective:  Patient seen at bedside this morning  He currently denies having any nausea, vomiting, chest pain or shortness of breath  He denies having any itchiness from his rash which is now largely dissipated  He seems much more awake and animated than from previous encounters  He largely cannot recall a majority of his hospital stay  Objective:  Vitals:  Temp:  [97 8 °F (36 6 °C)-98 5 °F (36 9 °C)] 98 3 °F (36 8 °C)  HR:  [] 111  Resp:  [18-30] 24  BP: (117-138)/(58-82) 128/73  SpO2:  [90 %-97 %] 97 %  Temp (24hrs), Av 2 °F (36 8 °C), Min:97 8 °F (36 6 °C), Max:98 5 °F (36 9 °C)  Current: Temperature: 98 3 °F (36 8 °C)    Physical Exam:   General Appearance:  Alert, interactive, nontoxic, no acute distress  Throat: Oropharynx moist without lesions      Lungs:   Clear to auscultation anteriorly bilaterally; no wheezes, rhonchi or rales; respirations unlabored on nasal cannula   Heart:  RRR; no murmur, rub or gallop appreciated   Abdomen:   Soft, non-tender, non-distended, positive bowel sounds  Extremities: No clubbing, cyanosis; patient has ongoing edema in his upper and lower extremities  Skin: No new rashes or lesions on exposed skin  No new draining wounds noted on exposed skin  Labs, Imaging, & Other studies:   All pertinent labs and imaging studies were personally reviewed  Results from last 7 days   Lab Units 02/28/19  0511 02/27/19  0602 02/26/19  1829  02/26/19  0632   WBC Thousand/uL 13 55* 15 35*  --   --  11 00*   HEMOGLOBIN g/dL 8 0* 8 2* 9 0*   < > 7 6*   PLATELETS Thousands/uL 134* 131*  --   --  81*    < > = values in this interval not displayed  Results from last 7 days   Lab Units 02/28/19  0511  02/22/19  0137 02/21/19  2243 02/21/19  2120   POTASSIUM mmol/L 3 2*   < > 3 9  --  4 1   CHLORIDE mmol/L 110*   < > 100  --  100   CO2 mmol/L 24   < > 23  --  25   CO2, I-STAT mmol/L  --   --   --  26  --    BUN mg/dL 16   < > 77*  --  74*   CREATININE mg/dL 2 18*   < > 4 93*  --  5 16*   EGFR ml/min/1 73sq m 32   < > 12  --  11   GLUCOSE, ISTAT mg/dl  --   --   --  117  --    CALCIUM mg/dL 7 7*   < > 7 0*  --  7 3*   AST U/L  --   --  12  --  12   ALT U/L  --   --  8*  --  <6*   ALK PHOS U/L  --   --  54  --  57    < > = values in this interval not displayed

## 2019-02-28 NOTE — ASSESSMENT & PLAN NOTE
Resolved, currently off oxygen  Likely multifactorial secondary to hemorrhagic shock, acute kidney injury requiring dialysis, MSSA bacteremia, obesity, prolonged immobilization  Will continue to monitor closely

## 2019-02-28 NOTE — UTILIZATION REVIEW
Karen Clark RN  Registered Nurse  Utilization Review  Utilization Review  Signed  Date of Service:  2/25/2019  2:31 PM  Continued Stay Review     Date: 2/25     Vital Signs: /67   Pulse (!) 122   Temp 97 9 °F (36 6 °C)   Resp (!) 23   Ht 5' 9" (1 753 m)   Wt (!) 166 kg (366 lb 2 9 oz)   SpO2 98%   BMI 54 08 kg/m²         02/25/19 1205   97 2 °F (36 2 °C)Abnormal    120 Abnormal    21      112/62   82 mmHg   97 %  02/25/19 1200   97 5 °F (36 4 °C)   120 Abnormal    15      104/58   78 mmHg   97 %  02/25/19 1155   97 5 °F (36 4 °C)   120 Abnormal    12      94/56   72 mmHg   98 %  02/25/19 1150   97 2 °F (36 2 °C)Abnormal    120 Abnormal    27Abnormal       94/56   74 mmHg   94 %  02/25/19 1145   97 2 °F (36 2 °C)Abnormal    122 Abnormal    18                        Assessment/Plan:   2/25 Progress notes:  Pt admitted with GI bleed/ Acute respiratory failure with hypoxia/ Acute blood loss anemia/ Hypovolemic shock/ Acute on chronic renal failure/ Acute systolic CHF     Intubated/ Vent     GI bleed secondary to pyloric junction ulcer status post EGD x2  Gastroenterology to evaluate today   Plan to discuss with GI utility of repeat endoscopy EGD and colonoscopy versus IR intervention with ongoing need for transfusion of blood products   Continue PPI infusion       Continue ventilatory support, pending plan with Gastroenterology assess for appropriateness of spontaneous breathing trial      Patient is more tachycardic today   For hemoglobin presently 8 2   Continue to trend patient's hemoglobin   Tachycardia may be related to beta-blocker withdrawal as well   Pending patient's it GI evaluation and need for ongoing patient is received procedure may initiate beta-blocker later this afternoon      CVVH presently being run even     Continue IV Ancef as per Infectious Disease   Completion date 03/10/2019  Lisa Yee patient develops fevers plan to recheck blood cultures         Medications:   Scheduled Meds: Current Facility-Administered Medications:    atorvastatin 40 mg Oral Daily With Dinner  calcium gluconate 1 G  100 mL IVPB 1 g Intravenous Once  cefazolin 2,000 mg Intravenous Q8H  chlorhexidine 15 mL Swish & Spit Q12H [address]  guaiFENesin 600 mg Oral Q12H ARACELIS  hydrocortisone   Topical BID  iron sucrose 100 mg Intravenous Once per day on Tue Thu Sat  metoclopramide 10 mg Intravenous Q6H [address]  midazolam        nystatin   Topical BID  sucralfate 1,000 mg Oral BID AC     Continuous Infusions:     NxStage K 4/Ca 3 20,000 mL Last Rate: 0 mL (02/25/19 0619)  pantoprozole (PROTONIX) infusion (Continuous) 8 mg/hr Last Rate: 8 mg/hr (02/25/19 1415)  propofol 5-50 mcg/kg/min Last Rate: 30 mcg/kg/min (02/25/19 1401)     PRN Meds:   acetaminophen    bisacodyl    fentanyl citrate (PF)    polyvinyl alcohol      Pertinent Labs/Diagnostic Results:   Wbc = 12 51  H/H = 8 2/ 25 6  Phos = 1 9     Age/Sex: 60 y o  male      Discharge Plan: TBD

## 2019-02-28 NOTE — ASSESSMENT & PLAN NOTE
Extensive bruising and swelling of the left upper extremity, will obtain vascular Doppler to rule out DVT

## 2019-02-28 NOTE — PROGRESS NOTES
Progress Note - Cardiology   Terrence Hawkins 60 y o  male MRN: [id number]  Encounter: [id number]  02/28/19  8:35 AM        Assessment/Plan:    1  Paroxysmal afib/flutter  Started back on Metoprolol tartrate 25 bid 2/25, increased to 50 bid, then 100 bid 2/27 for rate control  Had been up to 100 bid prior to GI bleed  Overall HR controlled, but elevated episodes at times  Amiodarone felt to cause a rash, so it was stopped  Not a good candidate for digoxin due to PATRICK requiring HD  Will not add CCB at this time, monitor BPs, overall BPs appear to be trending up, but will be undergoing HD later today  Anticoagulation (warfarin) held due to recent GI bleeding and persistent anemia  Consider resuming warfarin if Hgb remains stable  Last transfusion 2/26      2  Stress cardiomyopathy when originally admitted in setting of bacteremia, resolved by echo 2/19/19 showing EF 55%  3  PATRICK requiring CVVH/ESRD  Plan for IHD 2/28  Has significant net body overload  4  LBBB  Chronic, stable  5  BioAVR  No vegetation by CHON  On IV Kefzol for MSSA bacteremia  6  HTN  BP controlled on PO Metoprolol  7  MSSA bacteremia  On Kefzol IV  Follows with Dr Vimal Cabrera as outpt  Subjective/Objective   Chief Complaint: No chief complaint on file  Subjective: 60 y o  with a history of AS s/p bioAVR, HTN, HL, LBBB, originally admitted 1/24/19 to [facility] with chest pain and SOB, found to be in SVT by EMS, on initial assessment also found to be septic requiring pressors, as well as acute hypoxic respiratory failure requiring intubation  He was found to have MSSA bacteremia, was transferred to [facility] for further management  Echo showed decline in LV function to 30%, from prior 50%  He was treated with milrinone as well as pressors as it was felt he had mixed cardiogenic/septic shock  He required CVVH for PATRICK  CHON showed no vegetation  He also developed new afib/flutter which was treated with amiodarone   Milrinone was eventually able to be weaned off, as were pressors  Metoprolol was started in early 2/19  He was able to be extubated 2/6/19  Metoprolol was titrated up as tolerated for rate control  Repeat echo 2/19/19 showed EF normalized to 55%  He developed a diffuse body rash 2/20/19, so amiodarone was stopped due to concern that it was causing rash  He had issues with hypotension 2/21 requiring shorter dialysis session, then developed significant GI bleeding overnight 2/21-2/22, EGD showed large antral ulcer which was treated with clipping/injection  In setting of GI bleed required reintubation and pressor support, pressors were able to be stopped once bleeding resolved  Colonoscopy 2/25 showing ischemic colitis  Feels more tired today  No significant fevers  Denies SOB at rest or CP, no palpitations       Patient Active Problem List   Diagnosis    Aortic stenosis, mild    Essential hypertension    Hyperlipidemia    Left bundle branch block    Murmur    Osteoarthritis of both knees    Pericardial disease    Sciatica    Age-related cataract of right eye    Venous insufficiency    Bilateral leg edema    Stress-induced cardiomyopathy    Acute respiratory failure with hypoxia ([address] )    History of aortic valve replacement with bioprosthetic valve    Atrial fibrillation (HCC)    Staphylococcus aureus bacteremia    Thrombocytopenia (HCC)    Acute blood loss anemia    Leukocytosis    Cerebrovascular accident ([address] )    Toxic metabolic encephalopathy    Skin rash    Acute on chronic renal failure (HCC)    Hypovolemic shock (HCC)    GI bleed    Coagulopathy ([address] )    GI bleeding     Past Medical History:   Diagnosis Date    Allergic rhinitis     last assessed 9/12/12    Finger fracture, right     Closed fx of the middle phalanx of the right 5th finger  last assessed 1/30/14    Hemorrhoids     last assessed 2/10/14    Hyperlipidemia     Hypertension        Allergies   Allergen Reactions    Amiodarone Developed Rash    Penicillins Rash     However, has subsequently tolerated Cefazolin and Cefepime       Current Facility-Administered Medications   Medication Dose Route Frequency Provider Last Rate Last Dose    acetaminophen (TYLENOL) tablet 650 mg  650 mg Oral Q6H PRN Yaneth Yinka, CRNP   650 mg at 02/20/19 2007    atorvastatin (LIPITOR) tablet 40 mg  40 mg Oral Daily With KENIA BakerNP   40 mg at 02/27/19 1620    bisacodyl (DULCOLAX) rectal suppository 10 mg  10 mg Rectal Daily PRN Yaneth Dress, CRNP        ceFAZolin (ANCEF) 1 g in sodium chloride 0 9% 50 ml IVPB  1,000 mg Intravenous Q24H Yaneth Dress, CRNP 100 mL/hr at 02/27/19 1830 1,000 mg at 02/27/19 1830    hydrocortisone 1 % cream   Topical BID Yaneth Dress, CRNP        iron sucrose (VENOFER) 100 mg in sodium chloride 0 9 % 100 mL IVPB  100 mg Intravenous Once per day on Tue Thu Sat Yaneth Honeycutt, CRNP 105 mL/hr at 02/26/19 1046 100 mg at 02/26/19 1046    melatonin tablet 6 mg  6 mg Oral HS Leidy Martinez PA-C   6 mg at 02/27/19 2306    metoprolol tartrate (LOPRESSOR) tablet 100 mg  100 mg Oral Q12H Richie CRNP   100 mg at 02/27/19 2049    nystatin (MYCOSTATIN) powder   Topical BID Yaneth Dress, CRNP        omeprazole (PRILOSEC) suspension 2 mg/mL  20 mg Oral BID AC Kristal Bond, CRNP   20 mg at 02/27/19 1619    polyvinyl alcohol (LIQUIFILM TEARS) 1 4 % ophthalmic solution 1 drop  1 drop Both Eyes Q3H PRN Yaneth Dress, CRNP   1 drop at 02/13/19 1724    sucralfate (CARAFATE) oral suspension 1,000 mg  1,000 mg Oral BID AC Kristal Bond, CRNP   1,000 mg at 02/28/19 1117       Vitals: /73 (BP Location: Right arm)   Pulse (!) 111   Temp 98 3 °F (36 8 °C) (Oral)   Resp (!) 24   Ht 5' 9" (1 753 m)   Wt (!) 162 kg (357 lb 2 3 oz)   SpO2 95%   BMI 52 74 kg/m²     Intake/Output Summary (Last 24 hours) at 2/28/2019 0835  Last data filed at 2/27/2019 2238  Gross per 24 hour   Intake 120 ml   Output 0 ml   Net 120 ml Wt Readings from Last 3 Encounters:   02/27/19 (!) 162 kg (357 lb 2 3 oz)   01/24/19 (!) 154 kg (339 lb 8 1 oz)   06/11/18 (!) 155 kg (341 lb)       Body mass index is 52 74 kg/m²  ,     Vitals:    02/27/19 2049 02/27/19 2300 02/28/19 0300 02/28/19 0707   BP: 120/82 135/58 138/80 128/73   Pulse: (!) 114 93 104 (!) 111   Patient Position - Orthostatic VS:  Lying Lying Lying       Physical Exam:     GEN: awake, alert, in no acute distress  HEENT: Sclera anicteric, conjunctivae pink, mucous membranes moist   NECK: Supple, no carotid bruits, no significant JVD  HEART: irregular rhythm, HR currently controlled at rest, II/VI systolic murmur, no clicks, gallops or rubs  LUNGS: Clear to auscultation anteriorly bilaterally; no wheezes, rales, or rhonchi   ABDOMEN: Obese, soft, nontender, nondistended, normoactive bowel sounds  EXTREMITIES: Skin warm and well perfused, no clubbing, cyanosis, positive for edema of legs/arms  NEURO: No focal findings  SKIN: Normal without suspicious lesions on exposed skin  Lab Results:     BMP:  Results from last 7 days   Lab Units 02/28/19  0511 02/27/19  0602 02/26/19  1829 02/26/19  1213 02/26/19  0530 02/25/19  2340 02/25/19  1824  02/21/19  2243   POTASSIUM mmol/L 3 2* 3 9 4 4 3 9 3 9 3 9 4 1   < >  --    CHLORIDE mmol/L 110* 108 108 108 108 107 107   < >  --    CO2 mmol/L 24 24 26 27 26 26 25   < >  --    CO2, I-STAT mmol/L  --   --   --   --   --   --   --   --  26   BUN mg/dL 16 8 6 6 7 9 10   < >  --    CREATININE mg/dL 2 18* 1 21 0 86 0 76 0 88 0 90 1 01   < >  --    GLUCOSE, ISTAT mg/dl  --   --   --   --   --   --   --   --  117   CALCIUM mg/dL 7 7* 8 2* 8 3 7 9* 8 1* 7 9* 7 9*   < >  --     < > = values in this interval not displayed         CBC:   Results from last 7 days   Lab Units 02/28/19  0511 02/27/19  0602 02/26/19  1829 02/26/19  1213 02/26/19  0485 02/26/19  0530 02/25/19  2340  02/25/19  0530  02/24/19  0559  02/23/19  1838 02/23/19  1223 02/23/19  0550  02/22/19  0637   WBC Thousand/uL 13 55* 15 35*  --   --  11 00*  --   --   --  12 51*  --  15 57*  --  15 22*  --  18 90*  --  22 04*   HEMOGLOBIN g/dL 8 0* 8 2* 9 0* 7 7* 7 6* 7 9* 8 3*   < > 8 2*   < > 8 3*   < > 6 8* 6 5* 7 1*   < > 8 1*   HEMATOCRIT % 26 8* 26 6*  --   --  24 3*  --   --   --  25 6*  --  25 5*  --  20 5* 20 1* 21 2*   < > 24 9*   MCV fL 100* 97  --   --  95  --   --   --  92  --  91  --  90  --  88  --  90   PLATELETS Thousands/uL 134* 131*  --   --  81*  --   --   --  92*  --  86*  --  97*  --  82*  --  121*   MCH pg 29 7 29 9  --   --  29 7  --   --   --  29 6  --  29 5  --  29 7  --  29 5  --  29 2   MCHC g/dL 29 9* 30 8*  --   --  31 3*  --   --   --  32 0  --  32 5  --  33 2  --  33 5  --  32 5   RDW % 16 3* 16 6*  --   --  16 5*  --   --   --  16 4*  --  16 3*  --  15 9*  --  15 6*  --  15 8*   MPV fL 11 0 11 7  --   --  11 3  --   --   --  10 6  --  10 9  --  10 2  --  10 7  --  10 9   NRBC AUTO /100 WBCs  --  0  --   --  0  --   --   --  0  --  0  --   --   --   --   --  1   NRBC /100 WBC  --   --   --   --  1  --   --   --   --   --   --   --   --   --   --   --  1    < > = values in this interval not displayed          Results from last 7 days   Lab Units 02/28/19  0511 02/27/19  0602 02/26/19  1829   MAGNESIUM mg/dL 2 2 2 1 2 0       INR:   Results from last 7 days   Lab Units 02/24/19  0559 02/23/19  1839 02/23/19  0826 02/22/19  0637 02/22/19  0137 02/21/19  2120   INR  1 26* 1 26* 1 27* 1 36* 1 48* 2 65*       Lipid Profile:   Lab Results   Component Value Date    CHOL 146 07/18/2014    CHOL 145 02/21/2014     Lab Results   Component Value Date    HDL 44 02/13/2019    HDL 41 06/02/2018    HDL 52 06/27/2017     Lab Results   Component Value Date    LDLCALC 77 02/13/2019    LDLCALC 57 06/02/2018    LDLCALC 129 (H) 06/27/2017     Lab Results   Component Value Date    TRIG 129 02/13/2019    TRIG 102 06/02/2018    TRIG 71 06/27/2017         Hgb A1c:         Telemetry: personally reviewed, afib, HR currently in 70-80s, episodes up to 130s at times

## 2019-02-28 NOTE — QUICK NOTE
Patient returning from vascular Doppler ultrasound, found to have an art rate of 150  EKG with atrial flutter with variable block and heart rate around 120  Patient is currently remote overload 100 mg b i d  He is allergic to amiodarone  Discussed with Cardiology, agrees with continuing metoprolol b i d , metoprolol IV p r n  5 mg for sustained heart rate 135 minutes above  He also agrees with adding diltiazem 30 mg p  O  Q 8 hours  Give p o  Metoprolol as well as p o  Diltiazem now continue with telemetry monitoring      If despite maximal medical therapy heart rate remains uncontrolled consideration for electrophysiology consultation

## 2019-02-28 NOTE — ASSESSMENT & PLAN NOTE
First toe on the left  Appreciated Podiatry input, improving over time, continue with clinical monitoring  No further podiatry needs while in the hospital

## 2019-03-01 ENCOUNTER — APPOINTMENT (INPATIENT)
Dept: NON INVASIVE DIAGNOSTICS | Facility: HOSPITAL | Age: 59
DRG: 871 | End: 2019-03-01
Payer: COMMERCIAL

## 2019-03-01 ENCOUNTER — TELEPHONE (OUTPATIENT)
Dept: GASTROENTEROLOGY | Facility: CLINIC | Age: 59
End: 2019-03-01

## 2019-03-01 ENCOUNTER — APPOINTMENT (INPATIENT)
Dept: RADIOLOGY | Facility: HOSPITAL | Age: 59
DRG: 871 | End: 2019-03-01
Attending: INTERNAL MEDICINE
Payer: COMMERCIAL

## 2019-03-01 PROBLEM — R13.10 DYSPHAGIA: Status: ACTIVE | Noted: 2019-03-01

## 2019-03-01 LAB
ANION GAP SERPL CALCULATED.3IONS-SCNC: 4 MMOL/L (ref 4–13)
APTT PPP: 35 SECONDS (ref 26–38)
ATRIAL RATE: 308 BPM
BASOPHILS # BLD AUTO: 0.07 THOUSANDS/ΜL (ref 0–0.1)
BASOPHILS NFR BLD AUTO: 1 % (ref 0–1)
BUN SERPL-MCNC: 14 MG/DL (ref 5–25)
CALCIUM SERPL-MCNC: 7.1 MG/DL (ref 8.3–10.1)
CHLORIDE SERPL-SCNC: 106 MMOL/L (ref 100–108)
CO2 SERPL-SCNC: 29 MMOL/L (ref 21–32)
CREAT SERPL-MCNC: 2.19 MG/DL (ref 0.6–1.3)
EOSINOPHIL # BLD AUTO: 2.49 THOUSAND/ΜL (ref 0–0.61)
EOSINOPHIL NFR BLD AUTO: 20 % (ref 0–6)
ERYTHROCYTE [DISTWIDTH] IN BLOOD BY AUTOMATED COUNT: 16.2 % (ref 11.6–15.1)
GFR SERPL CREATININE-BSD FRML MDRD: 32 ML/MIN/1.73SQ M
GLUCOSE SERPL-MCNC: 86 MG/DL (ref 65–140)
HCT VFR BLD AUTO: 25.9 % (ref 36.5–49.3)
HCT VFR BLD AUTO: 26.8 % (ref 36.5–49.3)
HGB BLD-MCNC: 7.7 G/DL (ref 12–17)
HGB BLD-MCNC: 8.2 G/DL (ref 12–17)
IMM GRANULOCYTES # BLD AUTO: >0.5 THOUSAND/UL (ref 0–0.2)
IMM GRANULOCYTES NFR BLD AUTO: 11 % (ref 0–2)
INR PPP: 1.28 (ref 0.86–1.17)
LYMPHOCYTES # BLD AUTO: 0.65 THOUSANDS/ΜL (ref 0.6–4.47)
LYMPHOCYTES NFR BLD AUTO: 5 % (ref 14–44)
MAGNESIUM SERPL-MCNC: 2 MG/DL (ref 1.6–2.6)
MCH RBC QN AUTO: 30.1 PG (ref 26.8–34.3)
MCHC RBC AUTO-ENTMCNC: 30.6 G/DL (ref 31.4–37.4)
MCV RBC AUTO: 99 FL (ref 82–98)
MONOCYTES # BLD AUTO: 0.99 THOUSAND/ΜL (ref 0.17–1.22)
MONOCYTES NFR BLD AUTO: 8 % (ref 4–12)
NEUTROPHILS # BLD AUTO: 7.2 THOUSANDS/ΜL (ref 1.85–7.62)
NEUTS SEG NFR BLD AUTO: 55 % (ref 43–75)
NRBC BLD AUTO-RTO: 0 /100 WBCS
P AXIS: 55 DEGREES
PHOSPHATE SERPL-MCNC: 2.4 MG/DL (ref 2.7–4.5)
PLATELET # BLD AUTO: 143 THOUSANDS/UL (ref 149–390)
PMV BLD AUTO: 11.7 FL (ref 8.9–12.7)
POTASSIUM SERPL-SCNC: 3.2 MMOL/L (ref 3.5–5.3)
PROTHROMBIN TIME: 16.1 SECONDS (ref 11.8–14.2)
QRS AXIS: 113 DEGREES
QRSD INTERVAL: 158 MS
QT INTERVAL: 392 MS
QTC INTERVAL: 533 MS
RBC # BLD AUTO: 2.72 MILLION/UL (ref 3.88–5.62)
SODIUM SERPL-SCNC: 139 MMOL/L (ref 136–145)
T WAVE AXIS: -44 DEGREES
VENTRICULAR RATE: 111 BPM
WBC # BLD AUTO: 12.78 THOUSAND/UL (ref 4.31–10.16)

## 2019-03-01 PROCEDURE — 84100 ASSAY OF PHOSPHORUS: CPT | Performed by: INTERNAL MEDICINE

## 2019-03-01 PROCEDURE — 76937 US GUIDE VASCULAR ACCESS: CPT

## 2019-03-01 PROCEDURE — 93010 ELECTROCARDIOGRAM REPORT: CPT | Performed by: INTERNAL MEDICINE

## 2019-03-01 PROCEDURE — 85610 PROTHROMBIN TIME: CPT | Performed by: INTERNAL MEDICINE

## 2019-03-01 PROCEDURE — 99232 SBSQ HOSP IP/OBS MODERATE 35: CPT | Performed by: INTERNAL MEDICINE

## 2019-03-01 PROCEDURE — 85014 HEMATOCRIT: CPT | Performed by: INTERNAL MEDICINE

## 2019-03-01 PROCEDURE — C1751 CATH, INF, PER/CENT/MIDLINE: HCPCS

## 2019-03-01 PROCEDURE — 99233 SBSQ HOSP IP/OBS HIGH 50: CPT | Performed by: INTERNAL MEDICINE

## 2019-03-01 PROCEDURE — 80048 BASIC METABOLIC PNL TOTAL CA: CPT | Performed by: INTERNAL MEDICINE

## 2019-03-01 PROCEDURE — 02HV33Z INSERTION OF INFUSION DEVICE INTO SUPERIOR VENA CAVA, PERCUTANEOUS APPROACH: ICD-10-PCS | Performed by: INTERNAL MEDICINE

## 2019-03-01 PROCEDURE — 36556 INSERT NON-TUNNEL CV CATH: CPT | Performed by: RADIOLOGY

## 2019-03-01 PROCEDURE — 93971 EXTREMITY STUDY: CPT | Performed by: SURGERY

## 2019-03-01 PROCEDURE — 87493 C DIFF AMPLIFIED PROBE: CPT | Performed by: INTERNAL MEDICINE

## 2019-03-01 PROCEDURE — 93971 EXTREMITY STUDY: CPT

## 2019-03-01 PROCEDURE — 85025 COMPLETE CBC W/AUTO DIFF WBC: CPT | Performed by: INTERNAL MEDICINE

## 2019-03-01 PROCEDURE — 36556 INSERT NON-TUNNEL CV CATH: CPT

## 2019-03-01 PROCEDURE — 94762 N-INVAS EAR/PLS OXIMTRY CONT: CPT

## 2019-03-01 PROCEDURE — 92526 ORAL FUNCTION THERAPY: CPT

## 2019-03-01 PROCEDURE — 85018 HEMOGLOBIN: CPT | Performed by: INTERNAL MEDICINE

## 2019-03-01 PROCEDURE — 83735 ASSAY OF MAGNESIUM: CPT | Performed by: INTERNAL MEDICINE

## 2019-03-01 PROCEDURE — 77001 FLUOROGUIDE FOR VEIN DEVICE: CPT

## 2019-03-01 PROCEDURE — 85730 THROMBOPLASTIN TIME PARTIAL: CPT | Performed by: INTERNAL MEDICINE

## 2019-03-01 PROCEDURE — 99231 SBSQ HOSP IP/OBS SF/LOW 25: CPT | Performed by: INTERNAL MEDICINE

## 2019-03-01 PROCEDURE — 76937 US GUIDE VASCULAR ACCESS: CPT | Performed by: RADIOLOGY

## 2019-03-01 PROCEDURE — 77001 FLUOROGUIDE FOR VEIN DEVICE: CPT | Performed by: RADIOLOGY

## 2019-03-01 RX ORDER — OXYCODONE HYDROCHLORIDE 5 MG/1
2.5 TABLET ORAL EVERY 4 HOURS PRN
Status: DISCONTINUED | OUTPATIENT
Start: 2019-03-01 | End: 2019-03-02

## 2019-03-01 RX ORDER — HEPARIN SODIUM 1000 [USP'U]/ML
5000 INJECTION, SOLUTION INTRAVENOUS; SUBCUTANEOUS AS NEEDED
Status: DISCONTINUED | OUTPATIENT
Start: 2019-03-01 | End: 2019-03-05

## 2019-03-01 RX ORDER — OXYCODONE HYDROCHLORIDE 5 MG/1
5 TABLET ORAL EVERY 4 HOURS PRN
Status: DISCONTINUED | OUTPATIENT
Start: 2019-03-01 | End: 2019-03-02

## 2019-03-01 RX ORDER — POTASSIUM CHLORIDE 20 MEQ/1
40 TABLET, EXTENDED RELEASE ORAL ONCE
Status: COMPLETED | OUTPATIENT
Start: 2019-03-01 | End: 2019-03-01

## 2019-03-01 RX ORDER — HEPARIN SODIUM 1000 [USP'U]/ML
2500 INJECTION, SOLUTION INTRAVENOUS; SUBCUTANEOUS AS NEEDED
Status: DISCONTINUED | OUTPATIENT
Start: 2019-03-01 | End: 2019-03-05

## 2019-03-01 RX ORDER — HEPARIN SODIUM 1000 [USP'U]/ML
10000 INJECTION, SOLUTION INTRAVENOUS; SUBCUTANEOUS AS NEEDED
Status: DISCONTINUED | OUTPATIENT
Start: 2019-03-01 | End: 2019-03-01

## 2019-03-01 RX ORDER — HEPARIN SODIUM 10000 [USP'U]/100ML
3-30 INJECTION, SOLUTION INTRAVENOUS
Status: DISCONTINUED | OUTPATIENT
Start: 2019-03-01 | End: 2019-03-05

## 2019-03-01 RX ORDER — HEPARIN SODIUM 1000 [USP'U]/ML
5000 INJECTION, SOLUTION INTRAVENOUS; SUBCUTANEOUS AS NEEDED
Status: DISCONTINUED | OUTPATIENT
Start: 2019-03-01 | End: 2019-03-01

## 2019-03-01 RX ADMIN — HEPARIN SODIUM 18 UNITS/KG/HR: 10000 INJECTION, SOLUTION INTRAVENOUS at 17:31

## 2019-03-01 RX ADMIN — FUROSEMIDE 80 MG: 10 INJECTION, SOLUTION INTRAMUSCULAR; INTRAVENOUS at 08:30

## 2019-03-01 RX ADMIN — DIBASIC SODIUM PHOSPHATE, MONOBASIC POTASSIUM PHOSPHATE AND MONOBASIC SODIUM PHOSPHATE 1 TABLET: 852; 155; 130 TABLET ORAL at 10:53

## 2019-03-01 RX ADMIN — Medication 20 MG: at 17:43

## 2019-03-01 RX ADMIN — NYSTATIN: 100000 POWDER TOPICAL at 08:32

## 2019-03-01 RX ADMIN — OXYCODONE HYDROCHLORIDE 5 MG: 5 TABLET ORAL at 11:16

## 2019-03-01 RX ADMIN — Medication 20 MG: at 05:48

## 2019-03-01 RX ADMIN — ACETAMINOPHEN 650 MG: 325 TABLET, FILM COATED ORAL at 06:25

## 2019-03-01 RX ADMIN — POTASSIUM CHLORIDE 40 MEQ: 1500 TABLET, EXTENDED RELEASE ORAL at 10:53

## 2019-03-01 RX ADMIN — SUCRALFATE 1000 MG: 1 SUSPENSION ORAL at 05:48

## 2019-03-01 RX ADMIN — HYDROCORTISONE: 1 CREAM TOPICAL at 17:37

## 2019-03-01 RX ADMIN — OXYCODONE HYDROCHLORIDE 5 MG: 5 TABLET ORAL at 20:46

## 2019-03-01 RX ADMIN — CEFAZOLIN SODIUM 1000 MG: 10 INJECTION, POWDER, FOR SOLUTION INTRAVENOUS at 17:45

## 2019-03-01 RX ADMIN — METOPROLOL TARTRATE 100 MG: 50 TABLET, FILM COATED ORAL at 08:25

## 2019-03-01 RX ADMIN — NYSTATIN: 100000 POWDER TOPICAL at 17:37

## 2019-03-01 RX ADMIN — HYDROCORTISONE: 1 CREAM TOPICAL at 08:38

## 2019-03-01 RX ADMIN — ATORVASTATIN CALCIUM 40 MG: 40 TABLET, FILM COATED ORAL at 17:26

## 2019-03-01 RX ADMIN — SUCRALFATE 1000 MG: 1 SUSPENSION ORAL at 17:26

## 2019-03-01 RX ADMIN — FUROSEMIDE 80 MG: 10 INJECTION, SOLUTION INTRAMUSCULAR; INTRAVENOUS at 17:26

## 2019-03-01 RX ADMIN — DILTIAZEM HYDROCHLORIDE 30 MG: 30 TABLET, FILM COATED ORAL at 17:47

## 2019-03-01 RX ADMIN — MELATONIN 6 MG: at 22:13

## 2019-03-01 NOTE — PROGRESS NOTES
Progress Note - Cardiology   Gisela Leary 60 y o  male MRN: [id number]  Encounter: [id number]  03/01/19  10:15 AM        Assessment/Plan:    1  Paroxysmal afib/flutter  Started back on Metoprolol tartrate 25 bid 2/25, increased to 50 bid, then 100 bid 2/27 for rate control  Had been up to 100 bid prior to GI bleed  Overall HR controlled, but elevated episodes at times  Amiodarone felt to cause a rash, so it was stopped  Not a good candidate for digoxin due to PATRICK requiring HD  Will attempt to add CCB at this time, monitor BPs    Anticoagulation (warfarin) held due to recent GI bleeding and persistent anemia  As per discussion between GI and patient's outpt cardiologist Dr Oksana Harris, will not resume Coumadin at this time  Last transfusion 2/26      2  Stress cardiomyopathy when originally admitted in setting of bacteremia, resolved by echo 2/19/19 showing EF 55%  3  PATRICK requiring CVVH/ESRD  Underwent HD 2/28  Has significant net body overload  Renal following, for now T/Th/Sat HD as needed  4  LBBB  Chronic, stable  5  BioAVR  No vegetation by CHON  On IV Kefzol for MSSA bacteremia  6  HTN  BP controlled on PO Metoprolol  7  MSSA bacteremia  On Kefzol IV  Follows with Dr Oksana Harris as outpt  Subjective/Objective   Chief Complaint: No chief complaint on file  Subjective: 60 y o  with a history of AS s/p bioAVR, HTN, HL, LBBB, originally admitted 1/24/19 to [address] with chest pain and SOB, found to be in SVT by EMS, on initial assessment also found to be septic requiring pressors, as well as acute hypoxic respiratory failure requiring intubation  He was found to have MSSA bacteremia, was transferred to [facility] for further management  Echo showed decline in LV function to 30%, from prior 50%  He was treated with milrinone as well as pressors as it was felt he had mixed cardiogenic/septic shock  He required CVVH for PATRICK  CHON showed no vegetation   He also developed new afib/flutter which was treated with amiodarone  Milrinone was eventually able to be weaned off, as were pressors  Metoprolol was started in early 2/19  He was able to be extubated 2/6/19  Metoprolol was titrated up as tolerated for rate control  Repeat echo 2/19/19 showed EF normalized to 55%  He developed a diffuse body rash 2/20/19, so amiodarone was stopped due to concern that it was causing rash  He had issues with hypotension 2/21 requiring shorter dialysis session, then developed significant GI bleeding overnight 2/21-2/22, EGD showed large antral ulcer which was treated with clipping/injection  In setting of GI bleed required reintubation and pressor support, pressors were able to be stopped once bleeding resolved  Colonoscopy 2/25 showing ischemic colitis  Denies feeling short of breath, but has not gotten out of bed  Still has rectal tube in place due to loose stool  No significant fevers  Denies CP, no palpitations  Overall body edema       Patient Active Problem List   Diagnosis    Aortic stenosis, mild    Essential hypertension    Hyperlipidemia    Left bundle branch block    Murmur    Osteoarthritis of both knees    Pericardial disease    Sciatica    Age-related cataract of right eye    Venous insufficiency    Bilateral leg edema    Stress-induced cardiomyopathy    Acute respiratory failure with hypoxia ([address] )    History of aortic valve replacement with bioprosthetic valve    Persistent atrial fibrillation (HCC)    MSSA bacteremia    Thrombocytopenia (HCC)    Acute blood loss anemia    Leukocytosis    Cerebrovascular accident ([address] )    Toxic metabolic encephalopathy    Skin rash    Acute on chronic renal failure (HCC)    Hypovolemic shock (HCC)    GI bleed    Coagulopathy (HCC)    GI bleeding    Age-related nuclear cataract, bilateral    Open angle with borderline findings, low risk, bilateral    Presence of intraocular lens    Vitreous degeneration of both eyes    Left arm swelling    Deep tissue injury     Past Medical History:   Diagnosis Date    Allergic rhinitis     last assessed 9/12/12    Finger fracture, right     Closed fx of the middle phalanx of the right 5th finger  last assessed 1/30/14    Hemorrhoids     last assessed 2/10/14    Hyperlipidemia     Hypertension        Allergies   Allergen Reactions    Amiodarone      Developed Rash    Penicillins Rash     However, has subsequently tolerated Cefazolin and Cefepime       Current Facility-Administered Medications   Medication Dose Route Frequency Provider Last Rate Last Dose    acetaminophen (TYLENOL) tablet 650 mg  650 mg Oral Q6H PRN KATHY Crawford   650 mg at 03/01/19 4164    atorvastatin (LIPITOR) tablet 40 mg  40 mg Oral Daily With KATHY Baker   40 mg at 02/28/19 1816    bisacodyl (DULCOLAX) rectal suppository 10 mg  10 mg Rectal Daily PRN KATHY Crawford        ceFAZolin (ANCEF) 1 g in sodium chloride 0 9% 50 ml IVPB  1,000 mg Intravenous Q24H KATHY Crawford   Stopped at 02/28/19 1845    furosemide (LASIX) injection 80 mg  80 mg Intravenous BID (diuretic) Leonardo Bowen MD   80 mg at 03/01/19 0830    hydrocortisone 1 % cream   Topical BID KATHY Crawford        melatonin tablet 6 mg  6 mg Oral HS Leidy Martinez PA-C   6 mg at 02/28/19 2109    metoprolol (LOPRESSOR) injection 5 mg  5 mg Intravenous Q6H PRN Jovon Gomez MD        metoprolol tartrate (LOPRESSOR) tablet 100 mg  100 mg Oral Q12H [facility] Jovon Gomez MD   100 mg at 03/01/19 0825    nystatin (MYCOSTATIN) powder   Topical BID KATHY Crawford        omeprazole (PRILOSEC) suspension 2 mg/mL  20 mg Oral BID AC Kristal KAHTY Bond   20 mg at 03/01/19 0548    polyvinyl alcohol (LIQUIFILM TEARS) 1 4 % ophthalmic solution 1 drop  1 drop Both Eyes Q3H PRN KATHY Crawford   1 drop at 02/13/19 1724    potassium chloride (K-DUR,KLOR-CON) CR tablet 40 mEq  40 mEq Oral Once Jovon Gomez MD        potassium-sodium phosphateS (K-PHOS,PHOSPHA 250) 45298-250 mg tablet 1 tablet  1 tablet Oral Once Stacia Mann MD        sucralfate (CARAFATE) oral suspension 1,000 mg  1,000 mg Oral BID AC KristalKATHY Tadeo   1,000 mg at 03/01/19 0548       Vitals: /74 (BP Location: Left arm)   Pulse 92   Temp (!) 97 4 °F (36 3 °C) (Oral)   Resp (!) 24   Ht 5' 9" (1 753 m)   Wt (!) 162 kg (357 lb 9 4 oz)   SpO2 92%   BMI 52 81 kg/m²     Intake/Output Summary (Last 24 hours) at 3/1/2019 1015  Last data filed at 3/1/2019 0854  Gross per 24 hour   Intake 1810 ml   Output 3414 ml   Net -1604 ml     Wt Readings from Last 3 Encounters:   03/01/19 (!) 162 kg (357 lb 9 4 oz)   01/24/19 (!) 154 kg (339 lb 8 1 oz)   06/11/18 (!) 155 kg (341 lb)       Body mass index is 52 81 kg/m²  ,     Vitals:    02/28/19 1947 02/28/19 2334 03/01/19 0300 03/01/19 0730   BP: 98/53 111/76 116/62 123/74   Pulse: 90 82 79 92   Patient Position - Orthostatic VS:  Lying Lying Lying       Physical Exam:     GEN: awake, alert, in no acute distress  HEENT: Sclera anicteric, conjunctivae pink, mucous membranes moist   NECK: Supple, no carotid bruits, no significant JVD  HEART: irregular rhythm, HR currently controlled at rest, II/VI systolic murmur, no clicks, gallops or rubs  LUNGS: Clear to auscultation anteriorly bilaterally; no wheezes, rales, or rhonchi   ABDOMEN: Obese, soft, nontender, nondistended, normoactive bowel sounds  EXTREMITIES: Skin warm and well perfused, no clubbing, cyanosis, positive for edema of legs/arms  NEURO: No focal findings  SKIN: Normal without suspicious lesions on exposed skin      Lab Results:     BMP:  Results from last 7 days   Lab Units 03/01/19  0618 02/28/19  0511 02/27/19  0602 02/26/19  1829 02/26/19  1213 02/26/19  0530 02/25/19  2340   POTASSIUM mmol/L 3 2* 3 2* 3 9 4 4 3 9 3 9 3 9   CHLORIDE mmol/L 106 110* 108 108 108 108 107   CO2 mmol/L 29 24 24 26 27 26 26   BUN mg/dL 14 16 8 6 6 7 9   CREATININE mg/dL 2 19* 2 18* 1  21 0 86 0 76 0 88 0 90   CALCIUM mg/dL 7 1* 7 7* 8 2* 8 3 7 9* 8 1* 7 9*       CBC:   Results from last 7 days   Lab Units 03/01/19 0618 02/28/19  0511 02/27/19  0602 02/26/19  1829 02/26/19  1213 02/26/19  0632 02/26/19  0530  02/25/19  0530  02/24/19  0559  02/23/19  1838   WBC Thousand/uL 12 78* 13 55* 15 35*  --   --  11 00*  --   --  12 51*  --  15 57*  --  15 22*   HEMOGLOBIN g/dL 8 2* 8 0* 8 2* 9 0* 7 7* 7 6* 7 9*   < > 8 2*   < > 8 3*   < > 6 8*   HEMATOCRIT % 26 8* 26 8* 26 6*  --   --  24 3*  --   --  25 6*  --  25 5*  --  20 5*   MCV fL 99* 100* 97  --   --  95  --   --  92  --  91  --  90   PLATELETS Thousands/uL 143* 134* 131*  --   --  81*  --   --  92*  --  86*  --  97*   MCH pg 30 1 29 7 29 9  --   --  29 7  --   --  29 6  --  29 5  --  29 7   MCHC g/dL 30 6* 29 9* 30 8*  --   --  31 3*  --   --  32 0  --  32 5  --  33 2   RDW % 16 2* 16 3* 16 6*  --   --  16 5*  --   --  16 4*  --  16 3*  --  15 9*   MPV fL 11 7 11 0 11 7  --   --  11 3  --   --  10 6  --  10 9  --  10 2   NRBC AUTO /100 WBCs 0  --  0  --   --  0  --   --  0  --  0  --   --    NRBC /100 WBC  --   --   --   --   --  1  --   --   --   --   --   --   --     < > = values in this interval not displayed          Results from last 7 days   Lab Units 03/01/19 0618 02/28/19  0511 02/27/19  0602   MAGNESIUM mg/dL 2 0 2 2 2 1       INR:   Results from last 7 days   Lab Units 02/24/19  0559 02/23/19  1839 02/23/19  0826   INR  1 26* 1 26* 1 27*       Lipid Profile:   Lab Results   Component Value Date    CHOL 146 07/18/2014    CHOL 145 02/21/2014     Lab Results   Component Value Date    HDL 44 02/13/2019    HDL 41 06/02/2018    HDL 52 06/27/2017     Lab Results   Component Value Date    LDLCALC 77 02/13/2019    LDLCALC 57 06/02/2018    LDLCALC 129 (H) 06/27/2017     Lab Results   Component Value Date    TRIG 129 02/13/2019    TRIG 102 06/02/2018    TRIG 71 06/27/2017         Hgb A1c:         Telemetry: personally reviewed, afib, HR currently in 70-80s, episodes up to 150s at times

## 2019-03-01 NOTE — HEMODIALYSIS
Pt HR increased on monitor 120's - 140's with 25 min remaining in HD treatment  Pt asymptomatic  BP's stable during treatment  Pt was reinfused and HD discontinued  Dr Lacho Costa and pt primary nurse Austin Rhodes notified

## 2019-03-01 NOTE — SOCIAL WORK
Cm reviewed patient during care coordination rounds with Dr Prasanna Pino  Patient not stable for discharge today and no anticipated weekend discharge  Patient experiencing diarrhea and GI to clear before coumadin can be started  Patient's HR being monitored  Cm updated Kayla Pickett and Garima with Bianca Yee [phone] on patient's status and no anticipated discharge date at this time  Cm to update once known in order to confirm start date for patient's new start chair time TTS 10:45AM   Cm following

## 2019-03-01 NOTE — CONSULTS
Consultation - Vascular Surgery   Deepak Reeves 60 y o  male MRN: [id number]  Unit/Bed#: [facility] 805-01 Encounter: [id number]    Assessment and Plan:  58-year-old male with recent GI bleed now with left internal jugular nonocclusive thrombosis in setting of recent left internal jugular vein central line    Recommend systemic anticoagulation if cleared by GI  Warm compresses to L upper extremity  Mobilize patient with PT/OT  Rest of care per primary service      History of Present Illness   HPI:  Deepak Reeves is a 60 y o  male who presented on 01/23/2019 to [address] [place] [address] with sepsis and was subsequently transferred to [address] [place] [address] on 01/24/2019  He has had a prolonged and complicated hospital course including MRSA bacteremia for which he completed a course of antibiotics per ID recommendations, recent GI bleed in the setting of Coumadin for history of atrial fibrillation requiring upper and lower endoscopies which revealed antral ulcer and ischemic colitis  These conditions are being managed by the GI service  His Coumadin is being held in the setting of recent GI bleed  The patient has been noticed to have worsening left upper extremity swelling for the past several days  He previously during this admission had a left internal jugular vein central line placed on 02/04 which was removed on 2/14 and then proceeded to have another left internal jugular vein central line placed on 2/21 which was removed on 02/27  Given the worsening left upper extremity swelling, the primary service obtained left upper extremity venous duplex study today which revealed nonocclusive left internal jugular vein thrombus  Vascular surgery has thus been consulted for assistance with management of left IJ thrombus  Hasn't had any more bleeding per rectum           Inpatient consult to Vascular Surgery     Performed by  Cathy Laboy MD     Authorized by Stacia Mann MD              Review of Systems Constitutional: Positive for appetite change and fatigue  Negative for chills  HENT: Negative for congestion, sore throat and trouble swallowing  Eyes: Negative for pain, discharge, redness and itching  Respiratory: Negative for apnea, cough, shortness of breath and wheezing  Cardiovascular: Positive for leg swelling  Negative for chest pain and palpitations  Gastrointestinal: Positive for blood in stool and diarrhea  Negative for abdominal distention, abdominal pain, constipation, nausea and vomiting  Endocrine: Negative for cold intolerance and heat intolerance  Genitourinary: Negative for difficulty urinating, dysuria and frequency  Musculoskeletal: Negative for arthralgias, back pain and joint swelling  Skin: Negative for color change, pallor and rash  Allergic/Immunologic: Negative for environmental allergies and food allergies  Neurological: Negative for dizziness, seizures, facial asymmetry, numbness and headaches  Hematological: Negative for adenopathy  Does not bruise/bleed easily  Psychiatric/Behavioral: Negative for agitation, behavioral problems and hallucinations  Historical Information   Past Medical History:   Diagnosis Date    Allergic rhinitis     last assessed 9/12/12    Finger fracture, right     Closed fx of the middle phalanx of the right 5th finger  last assessed 1/30/14    Hemorrhoids     last assessed 2/10/14    Hyperlipidemia     Hypertension      Past Surgical History:   Procedure Laterality Date    AORTIC VALVE REPLACEMENT  07/30/2014    AVR with 25mm [facility] Ease bovine pericardial valve    CARDIAC CATHETERIZATION  07/18/2014    SLB left main-normal, circumflex-normal, ramus intermedius-normal, RCA was large and dominant giving rise to the PDA & a large posterolateral branch  No disease  last assessed 8/19/14     COLONOSCOPY N/A 2/25/2019    Procedure: COLONOSCOPY;  Surgeon: Priscilla Ponce DO;  Location: [facility];   Service: Gastroenterology    ESOPHAGOGASTRODUODENOSCOPY N/A 2/22/2019    Procedure: ESOPHAGOGASTRODUODENOSCOPY (EGD)-roadshow overnight;  Surgeon: Yeni Ramon DO;  Location: [facility]; Service: Gastroenterology    ESOPHAGOGASTRODUODENOSCOPY N/A 2/23/2019    Procedure: ESOPHAGOGASTRODUODENOSCOPY (EGD); Surgeon: Felix Yun MD;  Location: [facility]; Service: Gastroenterology    ESOPHAGOGASTRODUODENOSCOPY N/A 2/25/2019    Procedure: ESOPHAGOGASTRODUODENOSCOPY (EGD); Surgeon: Yeni Ramon DO;  [facility]: [facility];   Service: Gastroenterology    [facility] PLACEMENT  2/4/2019    [facility] PLACEMENT  2/18/2019    KNEE CARTILAGE SURGERY Left     medial meniscus tear     TESTICLE SURGERY      TONSILLECTOMY AND ADENOIDECTOMY      TOOTH EXTRACTION       Social History   Social History     Substance and Sexual Activity   Alcohol Use Never    Frequency: Never    Comment: ocassion /never drank alcohol per [employer]      Social History     Substance and Sexual Activity   Drug Use No     Social History     Tobacco Use   Smoking Status Never Smoker   Smokeless Tobacco Never Used     Family History   Problem Relation Age of Onset    Lung cancer Mother     Heart disease Mother         pacemaker placement     Coronary artery disease Father     Hypertension Father     Heart attack Father     Cancer Family         bladder        Meds/Allergies   current meds:   Current Facility-Administered Medications   Medication Dose Route Frequency    acetaminophen (TYLENOL) tablet 650 mg  650 mg Oral Q6H PRN    atorvastatin (LIPITOR) tablet 40 mg  40 mg Oral Daily With Dinner    bisacodyl (DULCOLAX) rectal suppository 10 mg  10 mg Rectal Daily PRN    ceFAZolin (ANCEF) 1 g in sodium chloride 0 9% 50 ml IVPB  1,000 mg Intravenous Q24H    diltiazem (CARDIZEM) tablet 30 mg  30 mg Oral Q6H [address]    furosemide (LASIX) injection 80 mg  80 mg Intravenous BID (diuretic)    hydrocortisone 1 % cream   Topical BID    melatonin tablet 6 mg  6 mg Oral HS    metoprolol (LOPRESSOR) injection 5 mg  5 mg Intravenous Q6H PRN    metoprolol tartrate (LOPRESSOR) tablet 100 mg  100 mg Oral Q12H [address]    nystatin (MYCOSTATIN) powder   Topical BID    omeprazole (PRILOSEC) suspension 2 mg/mL  20 mg Oral BID AC    oxyCODONE (ROXICODONE) IR tablet 2 5 mg  2 5 mg Oral Q4H PRN    oxyCODONE (ROXICODONE) IR tablet 5 mg  5 mg Oral Q4H PRN    polyvinyl alcohol (LIQUIFILM TEARS) 1 4 % ophthalmic solution 1 drop  1 drop Both Eyes Q3H PRN    sucralfate (CARAFATE) oral suspension 1,000 mg  1,000 mg Oral BID AC     Allergies   Allergen Reactions    Amiodarone      Developed Rash    Penicillins Rash     However, has subsequently tolerated Cefazolin and Cefepime       Objective   First Vitals:   Blood Pressure: 124/72 (01/24/19 1530)  Pulse: 94 (01/24/19 1530)  Temperature: 99 °F (37 2 °C) (01/24/19 1530)  Temp Source: Rectal (01/24/19 1530)  Respirations: 20 (01/24/19 1530)  Height: 5' 9" (175 3 cm) (01/24/19 1530)  Weight - Scale: (!) 145 kg (319 lb 3 6 oz) (01/24/19 1530)  SpO2: 98 % (01/24/19 1500)    Current Vitals:   Blood Pressure: 103/56 (03/01/19 1100)  Pulse: 102(telemetry) (03/01/19 1119)  Temperature: 98 8 °F (37 1 °C) (03/01/19 1100)  Temp Source: Axillary (03/01/19 1100)  Respirations: 22 (03/01/19 1100)  Height: 5' 9" (175 3 cm) (01/24/19 1530)  Weight - Scale: (!) 162 kg (357 lb 9 4 oz) (03/01/19 0600)  SpO2: 100 % (03/01/19 1100)      Intake/Output Summary (Last 24 hours) at 3/1/2019 1513  Last data filed at 3/1/2019 0854  Gross per 24 hour   Intake 1130 ml   Output 3414 ml   Net -2284 ml       Invasive Devices     Peripheral Intravenous Line            Peripheral IV 02/28/19 Right Forearm less than 1 day          Hemodialysis Catheter            Permanent HD Catheter  11 days                Physical Exam   Constitutional: He is oriented to person, place, and time  He appears well-developed and well-nourished     Obese   HENT: Head: Normocephalic and atraumatic  Eyes: Pupils are equal, round, and reactive to light  Conjunctivae and EOM are normal    Neck: Normal range of motion  Neck supple  Cardiovascular: Normal rate and normal heart sounds  Irregularly irregular rhythm  Palpable left upper extremity radial pulse   Pulmonary/Chest: Effort normal and breath sounds normal    Abdominal: Soft  Bowel sounds are normal  He exhibits no distension  Abdomen obese   Musculoskeletal: Normal range of motion  He exhibits edema  Left upper extremity swelling, compartments are soft, no phlegmasia   Neurological: He is alert and oriented to person, place, and time  Skin: Skin is warm and dry  Scattered ecchymosis L arm   Vitals reviewed  Lab Results:   CBC:   Lab Results   Component Value Date    WBC 12 78 (H) 03/01/2019    HGB 8 2 (L) 03/01/2019    HCT 26 8 (L) 03/01/2019    MCV 99 (H) 03/01/2019     (L) 03/01/2019    MCH 30 1 03/01/2019    MCHC 30 6 (L) 03/01/2019    RDW 16 2 (H) 03/01/2019    MPV 11 7 03/01/2019    NRBC 0 03/01/2019   , CMP:   Lab Results   Component Value Date    SODIUM 139 03/01/2019    K 3 2 (L) 03/01/2019     03/01/2019    CO2 29 03/01/2019    BUN 14 03/01/2019    CREATININE 2 19 (H) 03/01/2019    CALCIUM 7 1 (L) 03/01/2019    EGFR 32 03/01/2019   , Coagulation: No results found for: PT, INR, APTT, Urinalysis: No results found for: Yael Comber, SPECGRAV, PHUR, LEUKOCYTESUR, NITRITE, PROTEINUA, GLUCOSEU, KETONESU, BILIRUBINUR, BLOODU, Amylase: No results found for: AMYLASE, Lipase: No results found for: LIPASE  Imaging: I have personally reviewed pertinent films in PACS  EKG, Pathology, and Other Studies: I have personally reviewed pertinent films in PACS    Counseling / Coordination of Care  Total floor / unit time spent today 25 minutes  Greater than 50% of total time was spent with the patient and / or family counseling and / or coordination of care      Louie Weber MD  3/1/2019 3:13 PM

## 2019-03-01 NOTE — ASSESSMENT & PLAN NOTE
Currently only on atorvastatin, as per GI okay to resume antiplatelets with low-dose aspirin  Although, with discussed with family the new finding of left IJ thrombus, if okay for them to start low-dose heparin will hold on into restart aspirin the same time

## 2019-03-01 NOTE — NURSING NOTE
Pt tachycardiac into the 150s  Pt asymptomatic and BP stable  EKG complete Dr Natividad Naranjo aware and at bedside  New orders entered  Will give medication and continue to monitor closely

## 2019-03-01 NOTE — ASSESSMENT & PLAN NOTE
With episode of atrial flutter while in the ICU  He was started on amiodarone which was discontinued secondary to drug rash  He remains on beta-blocker, Cardizem was added today by Cardiology given episode of ventricular response yesterday night, 30 mg q 6 hours scheduled for now  Not a candidate for digoxin secondary to acute kidney injury  No anticoagulation at this time secondary to recent GI bleed  Cardiology continues to follow

## 2019-03-01 NOTE — PROGRESS NOTES
Progress Note - Infectious Disease   Santy Wasserman 60 y o  male MRN: [id number]  Unit/Bed#: [facility] 805-01 Encounter: [id number]      Impression/Plan:  1  MSSA bacteremia   Possibility of endocarditis considered in the setting of bioprosthetic AVR  Justina Maki no evidence of valvular vegetations   Initial portal of entry may have been related to multiple upper back wounds   CT chest/abdomen/pelvis with no other evidence of acute infection   Possible from pneumonia as sputum culture was positive for MSSA   Bacteremia eventually cleared   Podiatry evaluated patient's feet and no acute issues   Neurology evaluation noted with prior changes in mental status, imaging abnormalities felt to be ischemic and similar compared to prior studies   No plan for MRI   Patient improved significantly after recent GI bleed       -continue IV cefazolin at 1 g Q 24  -prolonged course of IV antibiotics of at least 6 weeks through 3/10  -monitor temperature/WBC  -send blood cultures if patient spikes fever  -can dose Ancef (2g/2g/3g) with HD once patient is on stable schedule     2  Rash:  Patient recently developed rash over this past week  It was noted to be flat and pruritic  Unclear etiology   Absolute eosinophil count elevated   Likely due to amiodarone as the patient tolerated rechallenge with Ancef       -continue to monitor rash  -monitor WBC  -continue to trend fever curve/vitals     3  Acute GI bleed:  Patient had an acute episode of bleeding this past weekend  [facility] was transferred to the ICU and had emergent endoscopy by Matthew Morris has clinically improved and is off pressors and extubated  [facility] is also noted with ischemic colitis on scope      -continue antibiotics as above for now  -ongoing care as per primary     4  History of bioprosthetic AVR   Secondary to degenerative AS   Placed in July 2014  Justina Maki no evidence of endocarditis   Ongoing follow-up by Cardiology      5  Acute kidney injury   Likely ischemic/septic ATN    Patient remains on hemodialysis  Trude Leak is status post PermCath placement      -ongoing follow-up by Nephrology  -antibiotics re-dose for intermittent HD      6  Shock   This is most likely secondary to GI bleed and hemorrhagic shock   Clinically, this is not septic shock   Patient has improved and is off pressors      -antibiotic plan as in above     7  Leukocytosis:  WBC  elevated   Patient continues to improve clinically        -continue to monitor fever curve/vitals  -continue monitor CBC  -continue antibiotics as above     Above plan discussed in detail with patient      ID consult service will formally re-evaluate the patient again on Monday  Please contact ID attending on call if any additional questions or concerns  Antibiotics:  Ancef    24 hour events:  No acute events noted overnight on chart review  Patient is currently afebrile  White blood cell count 12 7  Patient's other vitals are stable    Subjective:  Patient currently denies having any nausea, vomiting, chest pain or shortness of breath  He currently reports ongoing pain all over his back because he has been in the bed for so long  He has no other new complaints at this time  Objective:  Vitals:  Temp:  [96 °F (35 6 °C)-98 6 °F (37 °C)] 97 4 °F (36 3 °C)  HR:  [] 92  Resp:  [23-28] 24  BP: ()/(51-76) 123/74  SpO2:  [92 %-98 %] 92 %  Temp (24hrs), Av 8 °F (36 6 °C), Min:96 °F (35 6 °C), Max:98 6 °F (37 °C)  Current: Temperature: (!) 97 4 °F (36 3 °C)    Physical Exam:   General Appearance:  Alert, interactive, nontoxic, no acute distress  Chronically ill-appearing and obese  Throat: Oropharynx moist without lesions  Lungs:   Clear to auscultation anteriorly bilaterally; no wheezes, rhonchi or rales; respirations unlabored on room air   Heart:  RRR; no murmur, rub or gallop   Abdomen:   Soft, non-tender, non-distended, positive bowel sounds       Extremities: No clubbing, cyanosis; ongoing edema in the upper lower extremities bilaterally   Skin: No new rashes or lesions  No new draining wounds noted  Patient has large ecchymosis on the left upper extremity that is being scanned         Labs, Imaging, & Other studies:   All pertinent labs and imaging studies were personally reviewed  Results from last 7 days   Lab Units 03/01/19  0618 02/28/19  0511 02/27/19  0602   WBC Thousand/uL 12 78* 13 55* 15 35*   HEMOGLOBIN g/dL 8 2* 8 0* 8 2*   PLATELETS Thousands/uL 143* 134* 131*     Results from last 7 days   Lab Units 03/01/19  0618   POTASSIUM mmol/L 3 2*   CHLORIDE mmol/L 106   CO2 mmol/L 29   BUN mg/dL 14   CREATININE mg/dL 2 19*   EGFR ml/min/1 73sq m 32   CALCIUM mg/dL 7 1*

## 2019-03-01 NOTE — ASSESSMENT & PLAN NOTE
Vascular Doppler with superficial thrombophlebitis and left IJ thrombus  Discuss with GI, okay to start anticoagulation, vascular surgery consulted, suggesting systemic anticoagulation  I was unable to talk to the wife this afternoon, I will try later the to discussed the risk benefit of anticoagulation a in setting of recent GI bleed  Will order Ace wrapping of left upper extremity

## 2019-03-01 NOTE — PROGRESS NOTES
Progress Note - Ginny Bellamy 1960, 60 y o  male MRN: [id number]    Unit/Bed#: [facility] 805-01 Encounter: [id number]    Primary Care Provider: Kael Garcia DO   Date and time admitted to hospital: 1/24/2019  2:42 PM        Dysphagia  Assessment & Plan  Yesterday on dysphagia 1 with nectar thick, advanced to dysphagia 2 with thin liquids at speech pathology recommendation    Deep tissue injury  Assessment & Plan  First toe on the left  Appreciated Podiatry input, improving over time, continue with clinical monitoring  No further podiatry needs while in the hospital    Left arm swelling  Assessment & Plan  Vascular Doppler with superficial thrombophlebitis and left IJ thrombus  Discuss with GI, okay to start anticoagulation, vascular surgery consulted, suggesting systemic anticoagulation  I was unable to talk to the wife this afternoon, I will try later the to discussed the risk benefit of anticoagulation a in setting of recent GI bleed  Will order Ace wrapping of left upper extremity    Hypovolemic shock (HCC)  Assessment & Plan  Resolved    Acute on chronic renal failure [facility])  Assessment & Plan  Present on admission, ongoing  Patient required CC RT, currently on hemodialysis Tuesday, Thursday, Saturday  Nephrology continues to follow  Right PermCath in place  He has already dialysis set up as an outpatient once stable for discharge to rehabilitation    Skin rash  Assessment & Plan  Likely related to amiodarone, resolved    Toxic metabolic encephalopathy  Assessment & Plan  Appears resolved, patient is cooperative, although patient appears to have a very superficial understanding of his medical conditions at this time    Cerebrovascular accident [facility])  [address] [place] [address]  Currently only on atorvastatin, as per GI okay to resume antiplatelets with low-dose aspirin  Although, with discussed with family the new finding of left IJ thrombus, if okay for them to start low-dose heparin will hold on into restart aspirin the same time      Acute blood loss anemia  Assessment & Plan  Secondary to upper GI bleed, please refer to principal plan    Thrombocytopenia [facility])  Assessment & Plan  Resolving, will continue to monitor, last count 143    MSSA bacteremia  Assessment & Plan  Remains on IV cefazolin as per Infectious Disease recommendation, last dose of antibiotics March 10  MSSA bacteremia was present on admission at the beginning of hospital stay, he underwent extensive workup, including transesophageal echocardiogram without evidence of endocarditis  Infectious Disease continues to follow    Persistent atrial fibrillation [facility])  Assessment & Plan  With episode of atrial flutter while in the ICU  He was started on amiodarone which was discontinued secondary to drug rash  He remains on beta-blocker, Cardizem was added today by Cardiology given episode of ventricular response yesterday night, 30 mg q 6 hours scheduled for now  Not a candidate for digoxin secondary to acute kidney injury  No anticoagulation at this time secondary to recent GI bleed  Cardiology continues to follow    Acute respiratory failure with hypoxia [facility])  Assessment & Plan  Resolved, currently off oxygen  Likely multifactorial secondary to hemorrhagic shock, acute kidney injury requiring dialysis, MSSA bacteremia, obesity, prolonged immobilization  Will continue to monitor closely    Stress-induced cardiomyopathy  Assessment & Plan  Volume controlled with hemodialysis, remains a beta-blocker, cardiology continues to follow    * GI bleed  Assessment & Plan  Patient transfer from ICU on February 27  GI bleed with hypovolemic/hemorrhagic shock requiring also intubation with extubation on February 26  He underwent EGD on February 22nd positive for large gastric ulcer which was cauterized, injected with epinephrine, and also clip was applied  He underwent another endoscopy without intervention on February 23 and again on February 25 without new signs of bleeding  He underwent colonoscopy on  revealing small area of ischemic colitis and he underwent also colonic decompression at that time  He will remain on b i d  PPI for 8 weeks  He is status post transfusion with now hemoglobin stable around 8  GI has signed off the case, to be reconsulted p r n  If any issues  Also, as per consultant, okay to restart aspirin if indicated, if patient in need for systemic anticoagulation for the left IJ thrombus okay to start anticoagulation although patient remains high risk for rebleeding    He will need outpatient follow-up with EGD in 6-8 weeks, also will need repeat colonoscopy for ischemic colitis in 6-8 weeks given poor preparation    VTE Pharmacologic Prophylaxis: no  Pharmacologic: Pending conversation with wife  Mechanical VTE Prophylaxis in Place: Yes    Patient Centered Rounds: I have performed bedside rounds with nursing staff today  Discussions with Specialists or Other Care Team Provider:  Vascular surgery, GI, Infectious Disease, Nephrology, Cardiology    Education and Discussions with Family / Patient:  Mother-in-law    Time Spent for Care: 1 hour  More than 50% of total time spent on counseling and coordination of care as described above  Current Length of Stay: 36 day(s)    Current Patient Status: Inpatient   Certification Statement: The patient will continue to require additional inpatient hospital stay due to Please refer to above    Discharge Plan: To be determined, ultimately rehabilitation when medically stable    Code Status: Level 1 - Full Code      Subjective:   Denies complaints other than left upper extremity pain    Objective:     Vitals:   Temp (24hrs), Av 3 °F (36 8 °C), Min:97 4 °F (36 3 °C), Max:98 8 °F (37 1 °C)    Temp:  [97 4 °F (36 3 °C)-98 8 °F (37 1 °C)] 98 7 °F (37 1 °C)  HR:  [] 114  Resp:  [20-28] 20  BP: ()/(53-76) 110/60  SpO2:  [92 %-100 %] 98 %  Body mass index is 52 81 kg/m²       Input and Output Summary (last 24 hours): Intake/Output Summary (Last 24 hours) at 3/1/2019 1611  Last data filed at 3/1/2019 0854  Gross per 24 hour   Intake 1130 ml   Output 3414 ml   Net -2284 ml       Physical Exam:     Physical Exam   Cardiovascular: Normal rate, regular rhythm and normal heart sounds  Exam reveals no friction rub  No murmur heard  Pulmonary/Chest:   Extremely limited, anterior auscultation, grossly without abnormalities   Abdominal: Soft  Bowel sounds are normal  He exhibits no distension  There is no tenderness  There is no guarding  Musculoskeletal:   Plus three edema bilateral lower extremities, plus two right upper extremity, +3 left upper extremity   Neurological: He is alert  Oriented times 2-3   Skin:   Extensive bruising in the left upper extremity   Psychiatric: He has a normal mood and affect  Additional Data:     Labs:    Results from last 7 days   Lab Units 03/01/19  0618   WBC Thousand/uL 12 78*   HEMOGLOBIN g/dL 8 2*   HEMATOCRIT % 26 8*   PLATELETS Thousands/uL 143*   NEUTROS PCT % 55   LYMPHS PCT % 5*   MONOS PCT % 8   EOS PCT % 20*     Results from last 7 days   Lab Units 03/01/19  0618   POTASSIUM mmol/L 3 2*   CHLORIDE mmol/L 106   CO2 mmol/L 29   BUN mg/dL 14   CREATININE mg/dL 2 19*   CALCIUM mg/dL 7 1*     Results from last 7 days   Lab Units 02/24/19  0559   INR  1 26*       * I Have Reviewed All Lab Data Listed Above  * Additional Pertinent Lab Tests Reviewed:  All Labs Within Last 24 Hours Reviewed    Imaging:    Imaging Reports Reviewed Today Include:  Vascular Doppler  Imaging Personally Reviewed by Myself Includes:  None    Recent Cultures (last 7 days):           Last 24 Hours Medication List:     Current Facility-Administered Medications:  acetaminophen 650 mg Oral Q6H PRN Mary Magic, CRNP    atorvastatin 40 mg Oral Daily With KATHY Baker    bisacodyl 10 mg Rectal Daily PRN Mary Magic, CRNP    cefazolin 1,000 mg Intravenous Q24H Mary KATHY Vickers Last Rate: Stopped (02/28/19 1845)   diltiazem 30 mg Oral Q6H Doron Harrington MD    furosemide 80 mg Intravenous BID (diuretic) Priya Roca MD    hydrocortisone  Topical BID Lennis Monday, CRNP    melatonin 6 mg Oral HS Leidy Martinez PA-C    metoprolol 5 mg Intravenous Q6H PRN Eugune Libman, MD    metoprolol tartrate 100 mg Oral Q12H [facility] Eugune Libman, MD    nystatin  Topical BID Lennis Monday, CRNP    omeprazole (PRILOSEC) suspension 2 mg/mL 20 mg Oral BID AC Kristal Bond, KATHY    oxyCODONE 2 5 mg Oral Q4H PRN Eugune Libman, MD    oxyCODONE 5 mg Oral Q4H PRN Eugune Libman, MD    polyvinyl alcohol 1 drop Both Eyes Q3H PRN Lennis Monday, CRNP    sucralfate 1,000 mg Oral BID AC Lenanne Monday, CRNP         Today, Patient Was Seen By: Eugune Libman, MD    ** Please Note: Dragon 360 Dictation voice to text software may have been used in the creation of this document   **

## 2019-03-01 NOTE — ASSESSMENT & PLAN NOTE
Appears resolved, patient is cooperative, although patient appears to have a very superficial understanding of his medical conditions at this time

## 2019-03-01 NOTE — SPEECH THERAPY NOTE
Speech Language/Pathology    Speech/Language Pathology Progress Note    Patient Name: Rell Moran  [id number] Date: 3/1/2019     Problem List  Patient Active Problem List   Diagnosis    Aortic stenosis, mild    Essential hypertension    Hyperlipidemia    Left bundle branch block    Murmur    Osteoarthritis of both knees    Pericardial disease    Sciatica    Age-related cataract of right eye    Venous insufficiency    Bilateral leg edema    Stress-induced cardiomyopathy    Acute respiratory failure with hypoxia ([facility] )    History of aortic valve replacement with bioprosthetic valve    Persistent atrial fibrillation (HCC)    MSSA bacteremia    Thrombocytopenia (HCC)    Acute blood loss anemia    Leukocytosis    Cerebrovascular accident ([facility] [address] )    Toxic metabolic encephalopathy    Skin rash    Acute on chronic renal failure (HCC)    Hypovolemic shock (HCC)    GI bleed    Coagulopathy (HCC)    GI bleeding    Age-related nuclear cataract, bilateral    Open angle with borderline findings, low risk, bilateral    Presence of intraocular lens    Vitreous degeneration of both eyes    Left arm swelling    Deep tissue injury        Past Medical History  Past Medical History:   Diagnosis Date    Allergic rhinitis     last assessed 9/12/12    Finger fracture, right     Closed fx of the middle phalanx of the right 5th finger  last assessed 1/30/14    Hemorrhoids     last assessed 2/10/14    Hyperlipidemia     Hypertension         Past Surgical History  Past Surgical History:   Procedure Laterality Date    AORTIC VALVE REPLACEMENT  07/30/2014    AVR with 25mm [facility]"" Ease bovine pericardial valve    CARDIAC CATHETERIZATION  07/18/2014    SLB left main-normal, circumflex-normal, ramus intermedius-normal, RCA was large and dominant giving rise to the PDA & a large posterolateral branch  No disease    last assessed 8/19/14     COLONOSCOPY N/A 2/25/2019    Procedure: COLONOSCOPY;  Surgeon: Janice Okeefe DO;  Location: [facility]; Service: Gastroenterology    ESOPHAGOGASTRODUODENOSCOPY N/A 2/22/2019    Procedure: ESOPHAGOGASTRODUODENOSCOPY (EGD)-roadshow overnight;  Surgeon: Janice Okeefe DO;  [facility]: [facility]; Service: Gastroenterology    ESOPHAGOGASTRODUODENOSCOPY N/A 2/23/2019    Procedure: ESOPHAGOGASTRODUODENOSCOPY (EGD); Surgeon: Sharon Caldwell MD;  [facility]; Service: Gastroenterology    ESOPHAGOGASTRODUODENOSCOPY N/A 2/25/2019    Procedure: ESOPHAGOGASTRODUODENOSCOPY (EGD); Surgeon: Janice Okeefe DO;  [facility]: [facility]; Service: Gastroenterology    IR [address]  2/4/2019    IR MINA LAST HSPTL PLACEMENT  2/18/2019    KNEE CARTILAGE SURGERY Left     medial meniscus tear     TESTICLE SURGERY      TONSILLECTOMY AND ADENOIDECTOMY      TOOTH EXTRACTION           Subjective:  "The water is so good!" Patient is awake and alert  Positioned as upright as possible in bed and seen at am meal      Objective:  Patient continues on a puree diet with nectar thick liquids, with reduced intake  He is happy to be trialed with thin liquids and upgraded solids  SLP feeds patient  He takes thin liquids via straw (larger, consecutive sips) with no s/s aspiration  The patient has mixed consistency cereal with adequate mastication and transfer  Patient also trialed with regular hilton cracker  Mastication is slightly more prolonged, and O2 drops from 93-90%  Patient is on RA and sats at 91-93% throughout  Vocal quality is clear  Cough observed after meal, but does not appear related to PO intake  Assessment:  Patient tolerated upgraded solids and thin liquids well  Appears ready for diet upgrade to mechanical soft with thin liquids  Plan/Recommendations:  Upgrade diet to mechanical soft with thin liquids  Continue ST  May have pills whole in puree or with liquid  D/W RN

## 2019-03-01 NOTE — PROGRESS NOTES
Pt c/o increased pain in left arm  Swelling has increased and so has ecchymosis  I outlined the bruising so we can keep watch to see if it increases more  Tylenol given for pain   Both arms elevated on pillows

## 2019-03-01 NOTE — ASSESSMENT & PLAN NOTE
Yesterday on dysphagia 1 with nectar thick, advanced to dysphagia 2 with thin liquids at speech pathology recommendation

## 2019-03-01 NOTE — PROGRESS NOTES
Follow up Consultation    Nephrology   Kira Borja 60 y o  male MRN: [id number]  Unit/Bed#: [facility] 805-01 Encounter: [id number]      Physician Requesting Consult: Efren Jiang MD  Reason for Consult:  Evaluation and management of acute kidney injury       ASSESSMENT/PLAN:  62-year-old male past medical history of ischemic cardiomyopathy EF of 25% CKD stage 3 admitted with altered mental status and encephalopathy  Patient with GI bleed  Nephrology following for acute kidney injury  1)Acute kidney injury (POA):  - PATRICK most likely secondary to ischemic injury + questionable immune complex GN (MSSA bacteremia) now likely with ATN  - patient needing dialysis for his acute kidney injury  Was on CVVHD  - clinically patient appears to be hypervolemic and hemodynamically stable  - has tunneled dialysis catheter in place in the right IJ  - After review of records it appears that the patient has a baseline Creatinine of 0 9mg/dL  - patient was admitted with a creatinine of 2 63 mg/dL  - peak creatinine during this admission thus far was 7 74 mg/dL on February 13, 2019   - patient's creatinine today is at 2 19 mg/dL  - patient was last on CVVH on February 27, 2019  - last dialysis treatment was on February 28, 2019  - Avoid nephrotoxins, adjust meds to appropriate GFR   - Acid base and lytes stable except hypokalemia  - Await renal recovery  - BMP, magnesium, phosphorus daily  -   Goal UF of at least 3-3 5 kg as tolerated  For now will keep on a Tuesday Thursday Saturday schedule while monitoring for renal recovery  - Place on a renal diet when allowed diet order    - Strict I/O  - will place on Lasix 80 mg IV b i d     2)Blood pressure:  - Optimize hemodynamics   - Maintain MAP > 65mmHg  - Avoid BP fluctuations    - currently off of pressors    3)H/H / anemia:  - of low hemoglobin likely secondary to GI bleed  - most recent hemoglobin at 8 2 grams/deciliter  - Management as per primary team   - recommend hold IV iron due to infection    4)Volume status:  - Clinically patient appears to be hypervolemic   - aim for UF of 3-3 5 kilos with dialysis     5)Hypokalemia:  - to be corrected with dialysis  - Likely due to GI losses  - will replace today    6)Hypophosphatemia:  - Continue to monitor for now  - stable for now  - hold off on phos binders    7) AFib:  - management as per Primary team  - anticoagulation held due to GI bleed  - on metoprolol 25 mg p o  B i d     8) Ischemic cardiomyopathy with EF of 30%:  - Management as per primary team    9) acute hypoxic respiratory failure:  - on nasal cannula extubated  - management as per primary team    10) MSSA bacteremia:  - follow-up with ID  - on Ancef would need antibiotics until March 10, 2019  - significant eosinophilia today increased to 20  Could be secondary to Ancef versus PPI versus likely Carafate  Eosinophilia started on   Consider changing these medications  Thanks for the consult  Will continue to follow  Please call with questions/ concerns  Plan was discussed with the team in [address] MD, [facility], 3/1/2019, 9:18 AM              Objective :   Patient seen and examined in his room no overnight events status post hemodialysis yesterday with 2 5 kilos fluid removal   Reports no significant urine output  Remains afebrile          PHYSICAL EXAM  /74 (BP Location: Left arm)   Pulse 92   Temp (!) 97 4 °F (36 3 °C) (Oral)   Resp (!) 24   Ht 5' 9" (1 753 m)   Wt (!) 162 kg (357 lb 9 4 oz)   SpO2 92%   BMI 52 81 kg/m²   Temp (24hrs), Av 8 °F (36 6 °C), Min:96 °F (35 6 °C), Max:98 6 °F (37 °C)        Intake/Output Summary (Last 24 hours) at 3/1/2019 0918  Last data filed at 3/1/2019 0854  Gross per 24 hour   Intake 1810 ml   Output 3414 ml   Net -1604 ml       I/O last 24 hours: In:  [P O :1150;  I V :700; IV Piggyback:80]  Out:  [QQWPI:7080; Stool:850]      Current Weight: Weight - Scale: (!) 162 kg (357 lb 9 4 oz)  First Weight: Weight - Scale: (!) 145 kg (319 lb 3 6 oz)  Physical Exam   Constitutional: He is oriented to person, place, and time  He appears well-developed and well-nourished  No distress  Obese male   HENT:   Head: Normocephalic and atraumatic  Mouth/Throat: Oropharynx is clear and moist  No oropharyngeal exudate  Eyes: No scleral icterus  Neck: Neck supple  No JVD present  No tracheal deviation present  Right IJ tunneled dialysis catheter   Cardiovascular: Regular rhythm and normal heart sounds  Exam reveals no friction rub  No murmur heard  Pulmonary/Chest: Effort normal  He has no wheezes  He has no rales  Abdominal: Soft  He exhibits no mass  There is no tenderness  There is no rebound  Plus one pitting edema   Musculoskeletal: He exhibits edema  Plus two edema bilateral upper lower extremity   Neurological: He is alert and oriented to person, place, and time  Skin: Skin is warm  He is not diaphoretic  Psychiatric: He has a normal mood and affect  His behavior is normal    Nursing note and vitals reviewed  Review of Systems   Constitutional: Positive for fatigue  Negative for fever  HENT: Negative for congestion and sore throat  Respiratory: Negative for cough, shortness of breath and wheezing  Cardiovascular: Positive for leg swelling  Negative for chest pain and palpitations  Gastrointestinal: Negative for abdominal pain, diarrhea, nausea and vomiting  Genitourinary: Positive for decreased urine volume  Musculoskeletal: Positive for back pain  Skin: Negative for rash  Neurological: Negative for dizziness and headaches  Psychiatric/Behavioral: Negative for agitation and confusion  All other systems reviewed and are negative         Scheduled Meds:    Current Facility-Administered Medications:  acetaminophen 650 mg Oral Q6H PRN KATHY Jordan    atorvastatin 40 mg Oral Daily With KATHY Baker    bisacodyl 10 mg Rectal Daily PRN ExxEgoscue, CRNP    cefazolin 1,000 mg Intravenous Q24H ExxEgoscue, CRNP Last Rate: Stopped (02/28/19 1845)   furosemide 80 mg Intravenous BID (diuretic) Joanna Nelson MD    hydrocortisone  Topical BID ExxEgoscue, CRNP    melatonin 6 mg Oral HS Leidy Martinez PA-C    metoprolol 5 mg Intravenous Q6H PRN Stacia Mann MD    metoprolol tartrate 100 mg Oral Q12H [address] Stacia Mann MD    nystatin  Topical BID ExxEgoscue, CRNP    omeprazole (PRILOSEC) suspension 2 mg/mL 20 mg Oral BID AC Kristal Bond, KATHY    polyvinyl alcohol 1 drop Both Eyes Q3H PRN ExxEgoscue, CRNP    potassium chloride 40 mEq Oral Once Stacia Mann MD    potassium-sodium phosphateS 1 tablet Oral Once Stacia Mann MD    sucralfate 1,000 mg Oral BID AC BOKU, CRNP        PRN Meds:   acetaminophen    bisacodyl    metoprolol    polyvinyl alcohol    Continuous Infusions:       Invasive Devices:      Invasive Devices     Peripheral Intravenous Line            Peripheral IV 02/28/19 Right Forearm less than 1 day          Hemodialysis Catheter            Permanent HD Catheter  10 days          Drain            Rectal Tube With balloon 2 days                  LABORATORY:    Results from last 7 days   Lab Units 03/01/19  0618 02/28/19  0511 02/27/19  0602 02/26/19  1829 02/26/19  1213 02/26/19  0632 02/26/19  0530 02/25/19  2340  02/25/19  0530  02/24/19  0559  02/23/19  1838   WBC Thousand/uL 12 78* 13 55* 15 35*  --   --  11 00*  --   --   --  12 51*  --  15 57*  --  15 22*   HEMOGLOBIN g/dL 8 2* 8 0* 8 2* 9 0* 7 7* 7 6* 7 9* 8 3*   < > 8 2*   < > 8 3*   < > 6 8*   HEMATOCRIT % 26 8* 26 8* 26 6*  --   --  24 3*  --   --   --  25 6*  --  25 5*  --  20 5*   PLATELETS Thousands/uL 143* 134* 131*  --   --  81*  --   --   --  92*  --  86*  --  97*   POTASSIUM mmol/L 3 2* 3 2* 3 9 4 4 3 9  --  3 9 3 9   < > 3 6   < > 4 1   < >  --    CHLORIDE mmol/L 106 110* 108 108 108  --  108 107   < > 107   < > 108   < >  --    CO2 mmol/L 29 24 24 26 27  --  26 26   < > 25   < > 24   < >  --    BUN mg/dL 14 16 8 6 6  --  7 9   < > 13   < > 28*   < >  --    CREATININE mg/dL 2 19* 2 18* 1 21 0 86 0 76  --  0 88 0 90   < > 1 14   < > 2 10*   < >  --    CALCIUM mg/dL 7 1* 7 7* 8 2* 8 3 7 9*  --  8 1* 7 9*   < > 8 0*   < > 8 1*   < >  --    MAGNESIUM mg/dL 2 0 2 2 2 1 2 0 1 9  --  2 0 1 9   < > 2 0   < > 2 1   < >  --    PHOSPHORUS mg/dL 2 4* 2 8 2 4* 1 6* 1 7*  --  1 7* 2 3*   < > 1 9*   < > 2 4*   < >  --     < > = values in this interval not displayed  rest all reviewed    RADIOLOGY:  XR chest portable   Final Result by Payal Fuentes MD (02/22 7053)      Endotracheal tube in appropriate position  Cardiomegaly with stable mild pulmonary vascular congestion  Workstation performed: [id number]         CTA abdomen pelvis w wo contrast   Final Result by Nicole Parmar MD (02/22 2251)      1  Small foci of hyperdensity within the gastric antrum/pylorus area concerning for primary site of active hemorrhage given history of melanoma  In addition, there is a small focus of increased density within the rectum where additional active    hemorrhage is not excluded  2   Extensive distention of the entire colon with liquid stool and gas with loss of haustra within the descending colon and rectum  There are areas of pneumatosis seen within the splenic flexure and sigmoid colon  Correlate clinically for C  difficile    colitis, findings may be seen in the setting of toxic megacolon  3   Retroperitoneal stranding along the inferior iliopsoas muscle on the left, correlate for recent instrumentation  Underlying vasculature remains patent        I personally discussed the location of bleeding with Dr Grayson Martin on 2/22/2019 at 4:35 AM    I personally discussed the possibility for C  difficile colitis and pneumatosis with Kaiden Stern on 2/22/2019 at 4:43 AM                   Workstation performed: [id number]         XR chest portable   Final Result by Shantel Mcgraw MD (02/22 9486)   Tubes and lines as above without pneumothorax  Mild central congestion and left trace basilar effusion  Workstation performed: [id number]         US abdomen limited   Final Result by Jeff Walsh MD (02/19 1813)   No ascites  Workstation performed: [id number]         IR permacath placement   Final Result by Talia Aguilar MD (02/18 1150)   Impression:    Successful placement of tunneled hemodialysis catheter through right IJV access  Workstation performed: [id number]         VAS carotid complete study   Final Result by Halina Stringer MD (02/14 1446)      CT head wo contrast   Final Result by Alan Carranza MD (02/10 1534)   Interval development of subtle areas of decreased attenuation along the gray matter white matter interface in the bilateral parieto-occipital regions  Differential considerations include infectious etiologies such as abscess or septic    emboli, metastatic disease or ischemia  No intracranial hemorrhage or significant mass effect  Further characterization with brain MRI should be considered  The study was marked in [facility] for immediate notification  Workstation performed: [id number]         XR chest portable   Final Result by [facility] (02/08 9947)      Moderate pulmonary vascular congestion with small left pleural effusion  Stable cardiomegaly  No pneumothorax  Workstation performed: [id number]         XR chest portable   Final Result by Santa Pretty DO (02/05 0809)      Stable pulmonary edema with left basilar subsegmental atelectasis and possible effusion  Workstation performed: [id number]         XR chest portable   Final Result by Froylan Clarke MD (02/04 1637)      Stable congestion likely on the basis of heart failure with left basilar opacity, likely atelectasis and pleural fluid              Workstation performed: [id number]         IR permacath placement   Final Result by Marquis Acharya MD (02/04 9920)   Impression:    1  Successful image guided placement of right internal jugular tunneled central venous hemodialysis catheter   2  Successful image guided exchange of left internal jugular central venous catheter         Workstation performed: [id number]         XR chest portable   Final Result by Randa Herrera DO (01/30 0536)   Slight progression of congestive heart failure  Fluid and/or infiltrate left lower lobe  Workstation performed: [id number]         XR chest portable   Final Result by Gage Rhodes MD (01/27 3326)      Cardiomegaly with stable vascular congestion and mildly increased left basilar opacity, likely atelectasis/pleural effusion  Workstation performed: [id number]         XR chest portable   Final Result by Glenna Castillo MD (01/25 3317)      Expected positioning of endotracheal and enteric tubes  A right internal jugular central venous catheter has been changed since previous examination with a new one being larger in caliber  No pneumothorax  Workstation performed: [id number]         IR central line placement    (Results Pending)   VAS upper limb venous duplex scan, unilateral/limited    (Results Pending)   IR consult    (Results Pending)     Rest all reviewed    Portions of the record may have been created with voice recognition software  Occasional wrong word or "sound a like" substitutions may have occurred due to the inherent limitations of voice recognition software  Read the chart carefully and recognize, using context, where substitutions have occurred  If you have any questions, please contact the dictating provider

## 2019-03-01 NOTE — ASSESSMENT & PLAN NOTE
Remains on IV cefazolin as per Infectious Disease recommendation, last dose of antibiotics March 10  MSSA bacteremia was present on admission at the beginning of hospital stay, he underwent extensive workup, including transesophageal echocardiogram without evidence of endocarditis  Infectious Disease continues to follow

## 2019-03-01 NOTE — SOCIAL WORK
Cm reviewed patient during care coordination rounds with Dr Melanie Kowalski  No anticipated weekend discharge  Continue diuresis  Patient anticipated for possible discharge Sunday/Monday  Patient will likely need BLS transport arranged to go to [facility]  Family to be informed  Cm following

## 2019-03-01 NOTE — ASSESSMENT & PLAN NOTE
Patient transfer from ICU on February 27  GI bleed with hypovolemic/hemorrhagic shock requiring also intubation with extubation on February 26  He underwent EGD on February 22nd positive for large gastric ulcer which was cauterized, injected with epinephrine, and also clip was applied  He underwent another endoscopy without intervention on February 23 and again on February 25 without new signs of bleeding  He underwent colonoscopy on February 25th revealing small area of ischemic colitis and he underwent also colonic decompression at that time  He will remain on b i d  PPI for 8 weeks  He is status post transfusion with now hemoglobin stable around 8  GI has signed off the case, to be reconsulted p r n   If any issues  Also, as per consultant, okay to restart aspirin if indicated, if patient in need for systemic anticoagulation for the left IJ thrombus okay to start anticoagulation although patient remains high risk for rebleeding    He will need outpatient follow-up with EGD in 6-8 weeks, also will need repeat colonoscopy for ischemic colitis in 6-8 weeks given poor preparation

## 2019-03-01 NOTE — PROCEDURES
Central Line Insertion  Date/Time: 3/1/2019 3:22 PM  Performed by: Loree Yanez DO  Authorized by: Megha Pham MD     Patient location:  [facility] protocol:     Patient identity confirmed:  Verbally with patient and arm band  Pre-procedure details:     Hand hygiene: Hand hygiene performed prior to insertion      Sterile barrier technique: All elements of maximal sterile technique followed      Skin preparation:  ChloraPrep    Skin preparation agent: Skin preparation agent completely dried prior to procedure    Indications:     Central line indications: no peripheral vascular access    Anesthesia (see MAR for exact dosages): Anesthesia method:  Local infiltration    Local anesthetic:  Lidocaine 1% w/o epi  Procedure details:     Location:  Right internal jugular    Vessel type: vein      Laterality:  Right    Patient position:  Flat    Catheter type:  Double lumen    Catheter size:  5 Fr (Length 16)    Landmarks identified: yes      Ultrasound guidance: yes      Sterile ultrasound techniques: Sterile gel and sterile probe covers were used      Number of attempts:  1    Successful placement: yes      Vessel of catheter tip end:  SVC  Post-procedure details:     Post-procedure:  Dressing applied    Assessment:  Blood return through all ports and free fluid flow (Placement verified under Fluoro)    Post-procedure complications: none      Patient tolerance of procedure:   Tolerated well, no immediate complications

## 2019-03-01 NOTE — QUICK NOTE
Left IJ thrombus was discussed with wife  First and foremost, it has to be stated that wife relays to me that patient has severe learning disability  I have explained to her the presence of thrombus in the left internal jugular vein in the risk of progression of clot in the venous system condition to the heart  I have also express that although gastroenterologist approved the patient for anticoagulation if indicated patient remains very high risk for further bleeding  Wife understanding risk and benefit of anticoagulation, at this time would like to pursue anticoagulation  Therefore will start heparin drip, no initial bolus, keep PTT strictly between 60 and 80   I have discussed with pharmacy, I have discussed with pharmacy and re-bolus if indicated will be half of standard dose to avoid over-correction of the PTT   Will start H&H q 6 hours  If any drop in hemoglobin Gastroenterology should be called immediately

## 2019-03-02 ENCOUNTER — APPOINTMENT (INPATIENT)
Dept: DIALYSIS | Facility: HOSPITAL | Age: 59
DRG: 871 | End: 2019-03-02
Attending: INTERNAL MEDICINE
Payer: COMMERCIAL

## 2019-03-02 LAB
ABO GROUP BLD BPU: NORMAL
ANION GAP SERPL CALCULATED.3IONS-SCNC: 7 MMOL/L (ref 4–13)
APTT PPP: 119 SECONDS (ref 26–38)
APTT PPP: 173 SECONDS (ref 26–38)
APTT PPP: 83 SECONDS (ref 26–38)
APTT PPP: >210 SECONDS (ref 26–38)
BASOPHILS # BLD MANUAL: 0 THOUSAND/UL (ref 0–0.1)
BASOPHILS NFR MAR MANUAL: 0 % (ref 0–1)
BPU ID: NORMAL
BUN SERPL-MCNC: 22 MG/DL (ref 5–25)
C DIFF TOX GENS STL QL NAA+PROBE: NORMAL
CALCIUM SERPL-MCNC: 7 MG/DL (ref 8.3–10.1)
CHLORIDE SERPL-SCNC: 104 MMOL/L (ref 100–108)
CO2 SERPL-SCNC: 27 MMOL/L (ref 21–32)
CREAT SERPL-MCNC: 3.02 MG/DL (ref 0.6–1.3)
EOSINOPHIL # BLD MANUAL: 1.27 THOUSAND/UL (ref 0–0.4)
EOSINOPHIL NFR BLD MANUAL: 10 % (ref 0–6)
ERYTHROCYTE [DISTWIDTH] IN BLOOD BY AUTOMATED COUNT: 16.3 % (ref 11.6–15.1)
GFR SERPL CREATININE-BSD FRML MDRD: 22 ML/MIN/1.73SQ M
GLUCOSE SERPL-MCNC: 92 MG/DL (ref 65–140)
HCT VFR BLD AUTO: 24.3 % (ref 36.5–49.3)
HCT VFR BLD AUTO: 25.1 % (ref 36.5–49.3)
HCT VFR BLD AUTO: 25.3 % (ref 36.5–49.3)
HCT VFR BLD AUTO: 26.3 % (ref 36.5–49.3)
HGB BLD-MCNC: 7.4 G/DL (ref 12–17)
HGB BLD-MCNC: 7.5 G/DL (ref 12–17)
HGB BLD-MCNC: 7.8 G/DL (ref 12–17)
HGB BLD-MCNC: 8 G/DL (ref 12–17)
LYMPHOCYTES # BLD AUTO: 0.25 THOUSAND/UL (ref 0.6–4.47)
LYMPHOCYTES # BLD AUTO: 2 % (ref 14–44)
MAGNESIUM SERPL-MCNC: 1.9 MG/DL (ref 1.6–2.6)
MCH RBC QN AUTO: 29.4 PG (ref 26.8–34.3)
MCHC RBC AUTO-ENTMCNC: 29.9 G/DL (ref 31.4–37.4)
MCV RBC AUTO: 98 FL (ref 82–98)
METAMYELOCYTES NFR BLD MANUAL: 2 % (ref 0–1)
MONOCYTES # BLD AUTO: 0.25 THOUSAND/UL (ref 0–1.22)
MONOCYTES NFR BLD: 2 % (ref 4–12)
MYELOCYTES NFR BLD MANUAL: 1 % (ref 0–1)
NEUTROPHILS # BLD MANUAL: 10.52 THOUSAND/UL (ref 1.85–7.62)
NEUTS BAND NFR BLD MANUAL: 1 % (ref 0–8)
NEUTS SEG NFR BLD AUTO: 82 % (ref 43–75)
NRBC BLD AUTO-RTO: 0 /100 WBCS
PHOSPHATE SERPL-MCNC: 2.8 MG/DL (ref 2.7–4.5)
PLATELET # BLD AUTO: 152 THOUSANDS/UL (ref 149–390)
PLATELET BLD QL SMEAR: ADEQUATE
PMV BLD AUTO: 11.1 FL (ref 8.9–12.7)
POLYCHROMASIA BLD QL SMEAR: PRESENT
POTASSIUM SERPL-SCNC: 3.1 MMOL/L (ref 3.5–5.3)
RBC # BLD AUTO: 2.55 MILLION/UL (ref 3.88–5.62)
RBC MORPH BLD: PRESENT
SODIUM SERPL-SCNC: 138 MMOL/L (ref 136–145)
UNIT DISPENSE STATUS: NORMAL
UNIT PRODUCT CODE: NORMAL
UNIT RH: NORMAL
WBC # BLD AUTO: 12.68 THOUSAND/UL (ref 4.31–10.16)

## 2019-03-02 PROCEDURE — 85027 COMPLETE CBC AUTOMATED: CPT | Performed by: INTERNAL MEDICINE

## 2019-03-02 PROCEDURE — 84100 ASSAY OF PHOSPHORUS: CPT | Performed by: INTERNAL MEDICINE

## 2019-03-02 PROCEDURE — 99231 SBSQ HOSP IP/OBS SF/LOW 25: CPT | Performed by: INTERNAL MEDICINE

## 2019-03-02 PROCEDURE — 85018 HEMOGLOBIN: CPT | Performed by: INTERNAL MEDICINE

## 2019-03-02 PROCEDURE — 80048 BASIC METABOLIC PNL TOTAL CA: CPT | Performed by: INTERNAL MEDICINE

## 2019-03-02 PROCEDURE — 85730 THROMBOPLASTIN TIME PARTIAL: CPT | Performed by: INTERNAL MEDICINE

## 2019-03-02 PROCEDURE — 85014 HEMATOCRIT: CPT | Performed by: INTERNAL MEDICINE

## 2019-03-02 PROCEDURE — 94760 N-INVAS EAR/PLS OXIMETRY 1: CPT

## 2019-03-02 PROCEDURE — 99254 IP/OBS CNSLTJ NEW/EST MOD 60: CPT | Performed by: SURGERY

## 2019-03-02 PROCEDURE — 85730 THROMBOPLASTIN TIME PARTIAL: CPT | Performed by: PHYSICIAN ASSISTANT

## 2019-03-02 PROCEDURE — 85730 THROMBOPLASTIN TIME PARTIAL: CPT | Performed by: SURGERY

## 2019-03-02 PROCEDURE — 99232 SBSQ HOSP IP/OBS MODERATE 35: CPT | Performed by: INTERNAL MEDICINE

## 2019-03-02 PROCEDURE — 90935 HEMODIALYSIS ONE EVALUATION: CPT | Performed by: INTERNAL MEDICINE

## 2019-03-02 PROCEDURE — 85007 BL SMEAR W/DIFF WBC COUNT: CPT | Performed by: INTERNAL MEDICINE

## 2019-03-02 PROCEDURE — 83735 ASSAY OF MAGNESIUM: CPT | Performed by: INTERNAL MEDICINE

## 2019-03-02 RX ORDER — METOPROLOL TARTRATE 50 MG/1
100 TABLET, FILM COATED ORAL EVERY 12 HOURS SCHEDULED
Status: DISCONTINUED | OUTPATIENT
Start: 2019-03-02 | End: 2019-03-04

## 2019-03-02 RX ORDER — METOPROLOL TARTRATE 5 MG/5ML
5 INJECTION INTRAVENOUS ONCE
Status: COMPLETED | OUTPATIENT
Start: 2019-03-02 | End: 2019-03-02

## 2019-03-02 RX ORDER — OXYCODONE HYDROCHLORIDE 5 MG/1
7.5 TABLET ORAL EVERY 4 HOURS PRN
Status: DISCONTINUED | OUTPATIENT
Start: 2019-03-02 | End: 2019-03-16 | Stop reason: HOSPADM

## 2019-03-02 RX ORDER — OXYCODONE HYDROCHLORIDE 5 MG/1
5 TABLET ORAL EVERY 4 HOURS PRN
Status: DISCONTINUED | OUTPATIENT
Start: 2019-03-02 | End: 2019-03-16 | Stop reason: HOSPADM

## 2019-03-02 RX ADMIN — CEFAZOLIN SODIUM 1000 MG: 10 INJECTION, POWDER, FOR SOLUTION INTRAVENOUS at 18:39

## 2019-03-02 RX ADMIN — NYSTATIN: 100000 POWDER TOPICAL at 12:19

## 2019-03-02 RX ADMIN — DILTIAZEM HYDROCHLORIDE 30 MG: 30 TABLET, FILM COATED ORAL at 00:26

## 2019-03-02 RX ADMIN — Medication 20 MG: at 06:22

## 2019-03-02 RX ADMIN — METOPROLOL TARTRATE 100 MG: 50 TABLET, FILM COATED ORAL at 21:09

## 2019-03-02 RX ADMIN — HEPARIN SODIUM 12 UNITS/KG/HR: 10000 INJECTION, SOLUTION INTRAVENOUS at 18:50

## 2019-03-02 RX ADMIN — HYDROCORTISONE: 1 CREAM TOPICAL at 18:36

## 2019-03-02 RX ADMIN — METOPROLOL TARTRATE 5 MG: 5 INJECTION, SOLUTION INTRAVENOUS at 15:57

## 2019-03-02 RX ADMIN — SUCRALFATE 1000 MG: 1 SUSPENSION ORAL at 18:27

## 2019-03-02 RX ADMIN — SUCRALFATE 1000 MG: 1 SUSPENSION ORAL at 06:21

## 2019-03-02 RX ADMIN — OXYCODONE HYDROCHLORIDE 5 MG: 5 TABLET ORAL at 02:01

## 2019-03-02 RX ADMIN — DILTIAZEM HYDROCHLORIDE 30 MG: 30 TABLET, FILM COATED ORAL at 06:22

## 2019-03-02 RX ADMIN — HYDROCORTISONE: 1 CREAM TOPICAL at 12:20

## 2019-03-02 RX ADMIN — HEPARIN SODIUM 15 UNITS/KG/HR: 10000 INJECTION, SOLUTION INTRAVENOUS at 03:45

## 2019-03-02 RX ADMIN — NYSTATIN: 100000 POWDER TOPICAL at 18:36

## 2019-03-02 RX ADMIN — DILTIAZEM HYDROCHLORIDE 30 MG: 30 TABLET, FILM COATED ORAL at 18:30

## 2019-03-02 RX ADMIN — Medication 10 MG: at 18:28

## 2019-03-02 RX ADMIN — ATORVASTATIN CALCIUM 40 MG: 40 TABLET, FILM COATED ORAL at 18:30

## 2019-03-02 RX ADMIN — OXYCODONE HYDROCHLORIDE 5 MG: 5 TABLET ORAL at 08:19

## 2019-03-02 RX ADMIN — MELATONIN 6 MG: at 21:08

## 2019-03-02 RX ADMIN — OXYCODONE HYDROCHLORIDE 7.5 MG: 5 TABLET ORAL at 13:22

## 2019-03-02 RX ADMIN — METOPROLOL TARTRATE 5 MG: 1 INJECTION, SOLUTION INTRAVENOUS at 11:50

## 2019-03-02 NOTE — PROGRESS NOTES
Progress Note - General Surgery   Maryjo Maker 60 y o  male MRN: [id number]  Unit/Bed#: [facility] 805-01 Encounter: [id number]    Assessment:  70-year-old male with recent GI bleed now with left internal jugular nonocclusive thrombosis in setting of recent left internal jugular vein central line    Plan:  -cont hep gtt  -compression for LUE swelling  -care per primary    Subjective/Objective   Subjective: pain in left neck and arm  No numbness/tiongling  No effort on b/l UE strength exam, unable to evaluate    Objective:   Blood pressure 120/60, pulse 93, temperature 98 5 °F (36 9 °C), temperature source Oral, resp  rate 22, height 5' 9" (1 753 m), weight 127 kg (280 lb 3 3 oz), SpO2 96 %  ,Body mass index is 41 38 kg/m²  Intake/Output Summary (Last 24 hours) at 3/2/2019 0740  Last data filed at 3/1/2019 1844  Gross per 24 hour   Intake 450 ml   Output    Net 450 ml       Invasive Devices     Central Venous Catheter Line            CVC Central Lines 03/01/19 Double less than 1 day          Peripheral Intravenous Line            Peripheral IV 02/28/19 Right Forearm 1 day          Hemodialysis Catheter            Permanent HD Catheter  11 days                Physical Exam:   General: No acute distress  HEENT: Normocephalic, atraumatic with MMM  No scleral icterus  Neck: Normal ROM  No tracheal deviation  L neck with tenderness and palpable mass  Cardio: Normal rate, regular rhythm  Pulm: Normal respiratory effort  Abdomen: Soft, non-tender, non-distended  Extremities: PONCE, No edema   B/l UE palpable radials, equal  Neuro: Cranial nerves II-XII intact  Psych: Normal affect      Lab, Imaging and other studies:  CBC:   Lab Results   Component Value Date    WBC 12 68 (H) 03/02/2019    HGB 7 5 (L) 03/02/2019    HCT 25 1 (L) 03/02/2019    MCV 98 03/02/2019     03/02/2019    MCH 29 4 03/02/2019    MCHC 29 9 (L) 03/02/2019    RDW 16 3 (H) 03/02/2019    MPV 11 1 03/02/2019   , CMP:   Lab Results   Component Value Date SODIUM 138 03/02/2019    K 3 1 (L) 03/02/2019     03/02/2019    CO2 27 03/02/2019    BUN 22 03/02/2019    CREATININE 3 02 (H) 03/02/2019    CALCIUM 7 0 (L) 03/02/2019    EGFR 22 03/02/2019   , Coagulation:   Lab Results   Component Value Date    INR 1 28 (H) 03/01/2019     VTE Pharmacologic Prophylaxis: Heparin  VTE Mechanical Prophylaxis: sequential compression device

## 2019-03-02 NOTE — PROGRESS NOTES
PTT resulted as 173  Seems inaccurate since last PTT was 35 6hrs ago when drip was started  PTT was also pulled off of central line that heparin drip is going through  [employer] [employer] notified   Will have STAT PTT drawn peripherally per MD order

## 2019-03-02 NOTE — HEMODIALYSIS
Patient completed HD treatment using RIJ permacath without difficulty  BFR to 350  Patient denied cramping, dizziness, and SOB   1 5L of fluid removed  Patient transported back to room with Primary RNEstefany

## 2019-03-02 NOTE — ASSESSMENT & PLAN NOTE
Patient transfer from ICU on February 27  GI bleed with hypovolemic/hemorrhagic shock requiring also intubation with extubation on February 26  He underwent EGD on February 22nd positive for large gastric ulcer which was cauterized, injected with epinephrine, and also clip was applied  He underwent another endoscopy without intervention on February 23 and again on February 25 without new signs of bleeding  He underwent colonoscopy on February 25th revealing small area of ischemic colitis and he underwent also colonic decompression at that time  He will remain on b i d  PPI for 8 weeks  Status post multiple transfusion, today hemoglobin remained stable while on heparin drips  If hemoglobin is 7 5  Fluctuating between 7 5 and 8 0, previously on February 26 hemoglobin 7 6 which corrected to 8 without any intervention  Will continue to repeat H&H q 6 hours for another 24 hours well on heparin drips  GI has signed off the case, to be reconsulted p r n   If any issues  Also, as per consultant, okay to restart aspirin if indicated, if patient in need for systemic anticoagulation for the left IJ thrombus okay to start anticoagulation although patient remains high risk for rebleeding    He will need outpatient follow-up with EGD in 6-8 weeks, also will need repeat colonoscopy for ischemic colitis in 6-8 weeks given poor preparation

## 2019-03-02 NOTE — ASSESSMENT & PLAN NOTE
Patient underwent CT of the head at the end of January and then again beginning of February 2019, he was found to have abnormalities of put parietal occipital lobes, with suggestion of subacute strokes on latest CT scan  As per review of records, he was evaluated by neurologist and originally an MRI of the brain was requested although, at later time, he was decided to for sake MRI as an inpatient and if patient following up as outpatient with [facility]  Remains on atorvastatin  No aspirin as per now given recent GI bleed in currently anticoagulated with heparin drips  Monitor clinically

## 2019-03-02 NOTE — PROGRESS NOTES
NEPHROLOGY PROGRESS NOTE   Sander Langford 60 y o  male MRN: [id number]  Unit/Bed#: [facility] 834-01 Encounter: [id number]      ASSESSMENT & PLAN:  1  Acute kidney injury suspected secondary to ATN in the setting of septic shock  Initially started on CVVH on 01/24, transitioned to IHD on 02/01  Patient seen during dialysis treatment at 10:00 a m , his central venous catheter is accessed good blood, his blood pressure is in the lower side but stable, patient currently asymptomatic, UF as tolerated  2  Stress cardiomyopathy in the setting of bacteremia  Resolved based last echo  3  Acute hypoxemic respiratory failure, improved, on oxygen via nasal cannula  4  GI bleeding secondary to pyloric junction ulcer status post EGD x2   Status post injection and clipping    5  Acute blood loss anemia, monitor H&H and transfusion as needed  6  MSSA bacteremia, ID on board, antibiotics as per ID     7  Access right IJ PermCath in place, access with low-flow  Discussed with Dr Nancy Robins from Internal Medicine team    SUBJECTIVE:  Patient seen and examined, feeling tired, denies any chest pain or shortness of Breath  Patient seen on dialysis 10:10 a m        OBJECTIVE:  Current Weight: Weight - Scale: 127 kg (280 lb 3 3 oz)  Vitals:    03/02/19 0930   BP: (!) 95/47   Pulse: 79   Resp:    Temp:    SpO2:        Intake/Output Summary (Last 24 hours) at 3/2/2019 1009  Last data filed at 3/2/2019 0900  Gross per 24 hour   Intake 530 ml   Output    Net 530 ml     General:  Obese, conscious, cooperative, in not acute distress  Eyes: conjunctivae pale, anicteric sclerae  ENT: lips and mucous membranes moist  Neck: supple, no JVD  Chest: clear breath sounds bilateral, no crackles, ronchus or wheezings  CVS: distinct S1 & S2, normal rate, regular rhythm  Abdomen: soft, non-tender, non-distended, normoactive bowel sounds  Extremities: + edema of both legs  Skin: no rash  Neuro: awake, alert  Right IJ PermCath access with good blood flow      Medications:    Current Facility-Administered Medications:     acetaminophen (TYLENOL) tablet 650 mg, 650 mg, Oral, Q6H PRN, KATHY Skinner, 650 mg at 03/01/19 3304    atorvastatin (LIPITOR) tablet 40 mg, 40 mg, Oral, Daily With Dinner, KATHY Skinner, 40 mg at 03/01/19 1726    bisacodyl (DULCOLAX) rectal suppository 10 mg, 10 mg, Rectal, Daily PRN, KATHY Skinner    ceFAZolin (ANCEF) 1 g in sodium chloride 0 9% 50 ml IVPB, 1,000 mg, Intravenous, Q24H, KATHY Lugo, Stopped at 03/01/19 1830    diltiazem (CARDIZEM) tablet 30 mg, 30 mg, Oral, Q6H [address], Michaela De Jesus MD, 30 mg at 03/02/19 0622    furosemide (LASIX) injection 80 mg, 80 mg, Intravenous, BID (diuretic), Pamela Patrick MD, 80 mg at 03/01/19 1726    heparin (porcine) 25,000 units in 250 mL infusion (premix), 3-30 Units/kg/hr (Order-Specific), Intravenous, Titrated, Alexandru Lane MD, Stopped at 03/02/19 5945    heparin (porcine) injection 2,500 Units, 2,500 Units, Intravenous, PRN, Alexandru Lane MD    heparin (porcine) injection 5,000 Units, 5,000 Units, Intravenous, PRN, Alexandru Lane MD    hydrocortisone 1 % cream, , Topical, BID, KATHY Lugo    melatonin tablet 6 mg, 6 mg, Oral, HS, Leidy Martinez PA-C, 6 mg at 03/01/19 2213    metoprolol (LOPRESSOR) injection 5 mg, 5 mg, Intravenous, Q6H PRN, Alexandru Lane MD    metoprolol tartrate (LOPRESSOR) tablet 100 mg, 100 mg, Oral, Q12H [address], Alexandru Lane MD, 100 mg at 03/01/19 0825    nystatin (MYCOSTATIN) powder, , Topical, BID, KATHY Lugo    omeprazole (PRILOSEC) suspension 2 mg/mL, 20 mg, Oral, BID AC, KATHY Lugo, 20 mg at 03/02/19 0052    oxyCODONE (ROXICODONE) IR tablet 2 5 mg, 2 5 mg, Oral, Q4H PRN, Alexandru Lane MD    oxyCODONE (ROXICODONE) IR tablet 5 mg, 5 mg, Oral, Q4H PRN, Alexandru Lane MD, 5 mg at 03/02/19 0819    polyvinyl alcohol (LIQUIFILM TEARS) 1 4 % ophthalmic solution 1 drop, 1 drop, Both Eyes, Q3H PRN, [place] KATHY Veras, 1 drop at 02/13/19 1724    sucralfate (CARAFATE) oral suspension 1,000 mg, 1,000 mg, Oral, BID AC, Kristal Bond, KENIANP, 1,000 mg at 03/02/19 5115    Invasive Devices:        Lab Results:   Results from last 7 days   Lab Units 03/02/19  0617 03/02/19  0018 03/01/19  1715 03/01/19  0618 02/28/19  0511   WBC Thousand/uL 12 68*  --   --  12 78* 13 55*   HEMOGLOBIN g/dL 7 5* 7 8* 7 7* 8 2* 8 0*   HEMATOCRIT % 25 1* 25 3* 25 9* 26 8* 26 8*   PLATELETS Thousands/uL 152  --   --  143* 134*   SODIUM mmol/L 138  --   --  139 142   POTASSIUM mmol/L 3 1*  --   --  3 2* 3 2*   CHLORIDE mmol/L 104  --   --  106 110*   CO2 mmol/L 27  --   --  29 24   BUN mg/dL 22  --   --  14 16   CREATININE mg/dL 3 02*  --   --  2 19* 2 18*   CALCIUM mg/dL 7 0*  --   --  7 1* 7 7*   MAGNESIUM mg/dL 1 9  --   --  2 0 2 2   PHOSPHORUS mg/dL 2 8  --   --  2 4* 2 8       Portions of the record may have been created with voice recognition software  Occasional wrong word or "sound a like" substitutions may have occurred due to the inherent limitations of voice recognition software  Read the chart carefully and recognize, using context, where substitutions have occurred  If you have any questions, please contact the dictating provider

## 2019-03-02 NOTE — ASSESSMENT & PLAN NOTE
With episode of atrial flutter while in the ICU  He was started on amiodarone which was discontinued secondary to drug rash  Continue with beta-blocker, Cardizem, cardiologist continues to follow  Not a candidate for digoxin secondary to acute kidney injury  Anticoagulation with heparin drips

## 2019-03-02 NOTE — PROGRESS NOTES
Progress Note - Riley Espinoza 1960, 60 y o  male MRN: [id number]    Unit/Bed#: [facility] 834-01 Encounter: [id number]    Primary Care Provider: Van Hardin DO   Date and time admitted to hospital: 1/24/2019  2:42 PM        Dysphagia  Assessment & Plan  Out of ICU he was on dysphagia 1 with nectar thick, advanced to dysphagia 2 with thin liquids at speech pathology recommendation    Deep tissue injury  Assessment & Plan  First toe on the left  Appreciated Podiatry input, improving over time, continue with clinical monitoring  No further podiatry needs while in the hospital    GI bleed  Assessment & Plan  Patient transfer from ICU on February 27  GI bleed with hypovolemic/hemorrhagic shock requiring also intubation with extubation on February 26  He underwent EGD on February 22nd positive for large gastric ulcer which was cauterized, injected with epinephrine, and also clip was applied  He underwent another endoscopy without intervention on February 23 and again on February 25 without new signs of bleeding  He underwent colonoscopy on February 25th revealing small area of ischemic colitis and he underwent also colonic decompression at that time  He will remain on b i d  PPI for 8 weeks  Status post multiple transfusion, today hemoglobin remained stable while on heparin drips  If hemoglobin is 7 5  Fluctuating between 7 5 and 8 0, previously on February 26 hemoglobin 7 6 which corrected to 8 without any intervention  Will continue to repeat H&H q 6 hours for another 24 hours well on heparin drips  GI has signed off the case, to be reconsulted p r n   If any issues  Also, as per consultant, okay to restart aspirin if indicated, if patient in need for systemic anticoagulation for the left IJ thrombus okay to start anticoagulation although patient remains high risk for rebleeding    He will need outpatient follow-up with EGD in 6-8 weeks, also will need repeat colonoscopy for ischemic colitis in 6-8 weeks given poor preparation    Hypovolemic shock [facility])  Assessment & Plan  Resolved    Acute on chronic renal failure [facility])  Assessment & Plan  Present on admission, ongoing  Patient required CCRT, currently on hemodialysis Tuesday, Thursday, Saturday  Nephrology continues to follow  Right PermCath in place  He has already dialysis set up as an outpatient once stable for discharge to rehabilitation    Skin rash  Assessment & Plan  Likely related to amiodarone, resolved    Toxic metabolic encephalopathy  Assessment & Plan  Appears resolved, patient is cooperative, although patient appears to have a very superficial understanding of his medical conditions at this time    Cerebrovascular accident [facility])  Assessment & Plan  Patient underwent CT of the head at the end of January and then again beginning of February 2019, he was found to have abnormalities of put parietal occipital lobes, with suggestion of subacute strokes on latest CT scan  As per review of records, he was evaluated by neurologist and originally an MRI of the brain was requested although, at later time, he was decided to for sake MRI as an inpatient and if patient following up as outpatient with Stroke Clinic  Remains on atorvastatin  No aspirin as per now given recent GI bleed in currently anticoagulated with heparin drips  Monitor clinically      Acute blood loss anemia  Assessment & Plan  Secondary to upper GI bleed, please refer to principal plan    Thrombocytopenia [facility])  Assessment & Plan  Resolved  Latest Measurement 152, repeat in the morning    MSSA bacteremia  Assessment & Plan  Remains on IV cefazolin as per Infectious Disease recommendation, last dose of antibiotics March 10  MSSA bacteremia was present on admission at the beginning of hospital stay, he underwent extensive workup, including transesophageal echocardiogram without evidence of endocarditis  Infectious Disease continues to follow    Persistent atrial fibrillation [facility])  Assessment & Plan  With episode of atrial flutter while in the ICU  He was started on amiodarone which was discontinued secondary to drug rash  Continue with beta-blocker, Cardizem, cardiologist continues to follow  Not a candidate for digoxin secondary to acute kidney injury  Anticoagulation with heparin drips      Acute respiratory failure with hypoxia ([address] )  Assessment & Plan  Resolved, currently off oxygen  Likely multifactorial secondary to hemorrhagic shock, acute kidney injury requiring dialysis, MSSA bacteremia, obesity, prolonged immobilization  Will continue to monitor closely    Stress-induced cardiomyopathy  Assessment & Plan  Volume controlled with hemodialysis, remains a beta-blocker, cardiology continues to follow    * Left arm swelling  Assessment & Plan  Vascular Doppler with superficial thrombophlebitis and left IJ thrombus  Discuss with GI, okay to start anticoagulation, vascular surgery consulted, suggesting systemic anticoagulation    Will order Ace wrapping of left upper extremity  Discussed with wife at length, please refer to separate the noted dated March 1st, patient is currently on heparin drip with goal PTT between 60 and 80  His hemoglobin has remained grossly stable  No signs of overt bleeding at this time  Continue with q 6 hours a PTT  Continue with q 6 hours H&H    VTE Pharmacologic Prophylaxis: yes  Pharmacologic: Heparin Drip  Mechanical VTE Prophylaxis in Place: Yes    Patient Centered Rounds: I have performed bedside rounds with nursing staff today  Discussions with Specialists or Other Care Team Provider:  Nephrology    Education and Discussions with Family / Patient:  Patient, I have discussed patient condition with wife on March 1st   She advises me not to call or every day but only with any changing conditions significant updates    Time Spent for Care: 45 minutes  More than 50% of total time spent on counseling and coordination of care as described above      Current Length of Stay: 37 day(s)    Current Patient Status: Inpatient   Certification Statement: The patient will continue to require additional inpatient hospital stay due to Please refer to above    Discharge Plan:  Rehabilitation when medically stable    Code Status: Level 1 - Full Code      Subjective:   Continues to complain of left upper extremity pain  Minimal relief with current dose of oxycodone, agreeable to dose increase for pain control    Objective:     Vitals:   Temp (24hrs), Av 3 °F (36 8 °C), Min:97 9 °F (36 6 °C), Max:98 7 °F (37 1 °C)    Temp:  [97 9 °F (36 6 °C)-98 7 °F (37 1 °C)] 98 2 °F (36 8 °C)  HR:  [] 74  Resp:  [18-22] 18  BP: ()/(41-60) 92/53  SpO2:  [95 %-98 %] 97 %  Body mass index is 41 38 kg/m²  Input and Output Summary (last 24 hours): Intake/Output Summary (Last 24 hours) at 3/2/2019 1343  Last data filed at 3/2/2019 1144  Gross per 24 hour   Intake 830 ml   Output 2000 ml   Net -1170 ml       Physical Exam:     Physical Exam   Constitutional: He is oriented to person, place, and time  He appears well-developed  No distress  Cardiovascular: Normal rate, regular rhythm and normal heart sounds  Exam reveals no friction rub  No murmur heard  Pulmonary/Chest: Effort normal and breath sounds normal  No respiratory distress  He has no wheezes  He has no rales  Anterior auscultation secondary to patient poor mobility   Abdominal: Soft  Bowel sounds are normal  He exhibits no distension  There is no tenderness  Musculoskeletal: He exhibits edema (Plus three edema bilateral lower extremities, +2 right upper extremity, +3 left upper extremity, Ace wrapping is present on the left upper extremity)  Neurological: He is alert and oriented to person, place, and time  Skin: No erythema  Psychiatric: He has a normal mood and affect         Additional Data:     Labs:    Results from last 7 days   Lab Units 19  1317 19  0617  19  0618   WBC Thousand/uL  --  12 68*  -- 12 78*   HEMOGLOBIN g/dL 8 0* 7 5*   < > 8 2*   HEMATOCRIT % 26 3* 25 1*   < > 26 8*   PLATELETS Thousands/uL  --  152  --  143*   NEUTROS PCT %  --   --   --  55   LYMPHS PCT %  --   --   --  5*   LYMPHO PCT %  --  2*  --   --    MONOS PCT %  --   --   --  8   MONO PCT %  --  2*  --   --    EOS PCT %  --  10*  --  20*    < > = values in this interval not displayed  Results from last 7 days   Lab Units 03/02/19  0617   POTASSIUM mmol/L 3 1*   CHLORIDE mmol/L 104   CO2 mmol/L 27   BUN mg/dL 22   CREATININE mg/dL 3 02*   CALCIUM mg/dL 7 0*     Results from last 7 days   Lab Units 03/01/19  1715   INR  1 28*       * I Have Reviewed All Lab Data Listed Above  * Additional Pertinent Lab Tests Reviewed: All Labs Within Last 24 Hours Reviewed    Imaging:    Imaging Reports Reviewed Today Include:  None  Imaging Personally Reviewed by Myself Includes:  None    Recent Cultures (last 7 days):     Results from last 7 days   Lab Units 03/01/19  1441   C DIFF TOXIN B  NEGATIVE for C difficle toxin by PCR          Last 24 Hours Medication List:     Current Facility-Administered Medications:  acetaminophen 650 mg Oral Q6H PRN [employer], CRNP    atorvastatin 40 mg Oral Daily With Delbert Fox, CRNP    bisacodyl 10 mg Rectal Daily PRN [employer], CRNP    cefazolin 1,000 mg Intravenous Q24H [employer], CRNP Last Rate: Stopped (03/01/19 1830)   diltiazem 30 mg Oral Q6H [address] Shaniqua Pickens MD    furosemide 80 mg Intravenous BID (diuretic) Gianluca Fu MD    heparin (porcine) 3-30 Units/kg/hr (Order-Specific) Intravenous Titrated Dony Rider MD Last Rate: 12 Units/kg/hr (03/02/19 1022)   heparin (porcine) 2,500 Units Intravenous PRN Dony Rider MD    heparin (porcine) 5,000 Units Intravenous PRN Dony Rider MD    hydrocortisone  Topical BID [employer], CRNP    melatonin 6 mg Oral HS Leidy Martinez PA-C    metoprolol 5 mg Intravenous Q6H PRN Dony Rider MD    metoprolol tartrate 100 mg Oral Q12H Doron Harrington MD    nystatin  Topical BID KATHY Bang    omeprazole (PRILOSEC) suspension 2 mg/mL 20 mg Oral BID KATHY Chauhan    oxyCODONE 5 mg Oral Q4H PRN Gisela Garcia MD    oxyCODONE 7 5 mg Oral Q4H PRN Gisela Garcia MD    polyvinyl alcohol 1 drop Both Eyes Q3H PRN KATHY Bang    sucralfate 1,000 mg Oral BID AC KATHY Bang         Today, Patient Was Seen By: Gisela Garcia MD    ** Please Note: Dragon 360 Dictation voice to text software may have been used in the creation of this document   **

## 2019-03-02 NOTE — ASSESSMENT & PLAN NOTE
Present on admission, ongoing  Patient required CCRT, currently on hemodialysis Tuesday, Thursday, Saturday  Nephrology continues to follow  Right PermCath in place  He has already dialysis set up as an outpatient once stable for discharge to rehabilitation

## 2019-03-02 NOTE — PROGRESS NOTES
PTT 83  Order to maintain PTT between 60 and 80  Per protocol no change required  Spoke with Melvin Agate malik sierra to verify that no change is required  Jacy Patino stated that no change is required

## 2019-03-02 NOTE — PROGRESS NOTES
Progress Note - Cardiology   [address]aleb [address] 60 y o  male MRN: [id number]  Encounter: [id number]  03/02/19  12:07 PM        Assessment/Plan:    1  Paroxysmal afib/flutter  Started back on Metoprolol tartrate 25 bid 2/25, increased to 50 bid, then 100 bid 2/27 for rate control  Had been up to 100 bid prior to GI bleed  Overall HR controlled, but elevated episodes at times  Amiodarone felt to cause a rash, so it was stopped  Not a good candidate for digoxin due to PATRICK requiring HD  Started Diltiazem 30 PO q6hrs 3/1/19, this morning HR elevated after dialysis, did not receive morning PO Metoprolol yet  Anticoagulation (warfarin) held due to recent GI bleeding and persistent anemia  As per discussion between GI and patient's outpt cardiologist Dr Tito Torres, will not resume Coumadin at this time  Last transfusion 2/26      2  Stress cardiomyopathy when originally admitted in setting of bacteremia, resolved by echo 2/19/19 showing EF 55%  3  PATRICK requiring CVVH/ESRD  Underwent HD 2/28 and then today  Has significant net body overload  Renal following, for now T/Th/Sat HD as needed  4  LBBB  Chronic, stable  5  BioAVR  No vegetation by CHON  On IV Kefzol for MSSA bacteremia  6  HTN  BP controlled to low on PO Metoprolol and cardizem  7  MSSA bacteremia  On Kefzol IV  Follows with Dr Tito Torres as outpt  Subjective/Objective   Chief Complaint: No chief complaint on file  Subjective: 60 y o  with a history of AS s/p bioAVR, HTN, HL, LBBB, originally admitted 1/24/19 to [facility] with chest pain and SOB, found to be in SVT by EMS, on initial assessment also found to be septic requiring pressors, as well as acute hypoxic respiratory failure requiring intubation  He was found to have MSSA bacteremia, was transferred to [facility] for further management  Echo showed decline in LV function to 30%, from prior 50%   He was treated with milrinone as well as pressors as it was felt he had mixed cardiogenic/septic shock  He required CVVH for PATRICK  CHON showed no vegetation  He also developed new afib/flutter which was treated with amiodarone  Milrinone was eventually able to be weaned off, as were pressors  Metoprolol was started in early 2/19  He was able to be extubated 2/6/19  Metoprolol was titrated up as tolerated for rate control  Repeat echo 2/19/19 showed EF normalized to 55%  He developed a diffuse body rash 2/20/19, so amiodarone was stopped due to concern that it was causing rash  He had issues with hypotension 2/21 requiring shorter dialysis session, then developed significant GI bleeding overnight 2/21-2/22, EGD showed large antral ulcer which was treated with clipping/injection  In setting of GI bleed required reintubation and pressor support, pressors were able to be stopped once bleeding resolved  Colonoscopy 2/25 showing ischemic colitis  Denies feeling short of breath, but has not gotten out of bed  Tolerated dialysis this morning, but after dialysis HR was elevated to 150s  No significant fevers  Denies CP, no palpitations  Overall body edema       Patient Active Problem List   Diagnosis    Aortic stenosis, mild    Essential hypertension    Hyperlipidemia    Left bundle branch block    Murmur    Osteoarthritis of both knees    Pericardial disease    Sciatica    Age-related cataract of right eye    Venous insufficiency    Bilateral leg edema    Stress-induced cardiomyopathy    Acute respiratory failure with hypoxia ([address] )    History of aortic valve replacement with bioprosthetic valve    Persistent atrial fibrillation (HCC)    MSSA bacteremia    Thrombocytopenia (HCC)    Acute blood loss anemia    Leukocytosis    Cerebrovascular accident ([address] )    Toxic metabolic encephalopathy    Skin rash    Acute on chronic renal failure (HCC)    Hypovolemic shock (HCC)    GI bleed    Coagulopathy (HCC)    GI bleeding    Age-related nuclear cataract, bilateral    Open angle with borderline findings, low risk, bilateral    Presence of intraocular lens    Vitreous degeneration of both eyes    Left arm swelling    Deep tissue injury    Dysphagia     Past Medical History:   Diagnosis Date    Allergic rhinitis     last assessed 9/12/12    Finger fracture, right     Closed fx of the middle phalanx of the right 5th finger  last assessed 1/30/14    Hemorrhoids     last assessed 2/10/14    Hyperlipidemia     Hypertension        Allergies   Allergen Reactions    Amiodarone      Developed Rash    Penicillins Rash     However, has subsequently tolerated Cefazolin and Cefepime       Current Facility-Administered Medications   Medication Dose Route Frequency Provider Last Rate Last Dose    acetaminophen (TYLENOL) tablet 650 mg  650 mg Oral Q6H PRN KATHY Pyle   650 mg at 03/01/19 3974    atorvastatin (LIPITOR) tablet 40 mg  40 mg Oral Daily With KATHY Baker   40 mg at 03/01/19 1726    bisacodyl (DULCOLAX) rectal suppository 10 mg  10 mg Rectal Daily PRN KATHY Pyle        ceFAZolin (ANCEF) 1 g in sodium chloride 0 9% 50 ml IVPB  1,000 mg Intravenous Q24H KATHY Pyle   Stopped at 03/01/19 1830    diltiazem (CARDIZEM) tablet 30 mg  30 mg Oral Q6H [address] Marissa Stern MD   30 mg at 03/02/19 0622    furosemide (LASIX) injection 80 mg  80 mg Intravenous BID (diuretic) Jose Humphrey MD   80 mg at 03/01/19 1726    heparin (porcine) 25,000 units in 250 mL infusion (premix)  3-30 Units/kg/hr (Order-Specific) Intravenous Titrated Nakul Villagomez MD 15 mL/hr at 03/02/19 1022 12 Units/kg/hr at 03/02/19 1022    heparin (porcine) injection 2,500 Units  2,500 Units Intravenous PRN Nakul Villagomez MD        heparin (porcine) injection 5,000 Units  5,000 Units Intravenous PRN Nakul Villagomez MD        hydrocortisone 1 % cream   Topical BID KATHY Pyle        melatonin tablet 6 mg  6 mg Oral HS Leidy Martinez PA-C   6 mg at 03/01/19 2213    metoprolol (LOPRESSOR) injection 5 mg  5 mg Intravenous Q6H PRN Baylee Dash MD   5 mg at 03/02/19 1150    metoprolol tartrate (LOPRESSOR) tablet 100 mg  100 mg Oral Q12H [facility] Baylee Dash MD   100 mg at 03/01/19 0825    nystatin (MYCOSTATIN) powder   Topical BID KATHY Mckeon        omeprazole (PRILOSEC) suspension 2 mg/mL  20 mg Oral BID AC Kristal BondKATHY jacques   20 mg at 03/02/19 4369    oxyCODONE (ROXICODONE) IR tablet 2 5 mg  2 5 mg Oral Q4H PRN Baylee Dash MD        oxyCODONE (ROXICODONE) IR tablet 5 mg  5 mg Oral Q4H PRN Baylee Dash MD   5 mg at 03/02/19 0819    polyvinyl alcohol (LIQUIFILM TEARS) 1 4 % ophthalmic solution 1 drop  1 drop Both Eyes Q3H PRN KATHY Mckeon   1 drop at 02/13/19 1724    sucralfate (CARAFATE) oral suspension 1,000 mg  1,000 mg Oral BID AC Kristal BondKATHY   1,000 mg at 03/02/19 6075       Vitals: /57 (BP Location: Right arm)   Pulse (!) 156   Temp 98 2 °F (36 8 °C) (Tympanic)   Resp 18   Ht 5' 9" (1 753 m)   Wt 127 kg (280 lb 3 3 oz)   SpO2 98%   BMI 41 38 kg/m²     Intake/Output Summary (Last 24 hours) at 3/2/2019 1207  Last data filed at 3/2/2019 1144  Gross per 24 hour   Intake 830 ml   Output 2000 ml   Net -1170 ml     Wt Readings from Last 3 Encounters:   03/02/19 127 kg (280 lb 3 3 oz)   01/24/19 (!) 154 kg (339 lb 8 1 oz)   06/11/18 (!) 155 kg (341 lb)       Body mass index is 41 38 kg/m²  ,     Vitals:    03/02/19 1030 03/02/19 1100 03/02/19 1144 03/02/19 1150   BP: 119/53 96/55 115/57    Pulse: 56 57 105 (!) 156   Patient Position - Orthostatic VS:   Lying        Physical Exam:     GEN: awake, alert, in no acute distress  HEENT: Sclera anicteric, conjunctivae pink, mucous membranes moist   NECK: Supple, no carotid bruits, no significant JVD  HEART: irregular rhythm, HR currently tachycardic, II/VI systolic murmur, no clicks, gallops or rubs     LUNGS: Clear to auscultation anteriorly bilaterally; no wheezes, rales, or rhonchi ABDOMEN: Obese, soft, nontender, nondistended, normoactive bowel sounds  EXTREMITIES: Skin warm and well perfused, no clubbing, cyanosis, positive for edema of legs/arms  NEURO: No focal findings  SKIN: Normal without suspicious lesions on exposed skin  Lab Results:     BMP:  Results from last 7 days   Lab Units 03/02/19  0617 03/01/19  0618 02/28/19  0511 02/27/19  0602 02/26/19  1829 02/26/19  1213 02/26/19  0530   POTASSIUM mmol/L 3 1* 3 2* 3 2* 3 9 4 4 3 9 3 9   CHLORIDE mmol/L 104 106 110* 108 108 108 108   CO2 mmol/L 27 29 24 24 26 27 26   BUN mg/dL 22 14 16 8 6 6 7   CREATININE mg/dL 3 02* 2 19* 2 18* 1 21 0 86 0 76 0 88   CALCIUM mg/dL 7 0* 7 1* 7 7* 8 2* 8 3 7 9* 8 1*       CBC:   Results from last 7 days   Lab Units 03/02/19  0617 03/02/19  0018 03/01/19  1715 03/01/19  0618 02/28/19  0511 02/27/19  0602 02/26/19  1829  02/26/19  0632  02/25/19  0530  02/24/19  0559   WBC Thousand/uL 12 68*  --   --  12 78* 13 55* 15 35*  --   --  11 00*  --  12 51*  --  15 57*   HEMOGLOBIN g/dL 7 5* 7 8* 7 7* 8 2* 8 0* 8 2* 9 0*   < > 7 6*   < > 8 2*   < > 8 3*   HEMATOCRIT % 25 1* 25 3* 25 9* 26 8* 26 8* 26 6*  --   --  24 3*  --  25 6*  --  25 5*   MCV fL 98  --   --  99* 100* 97  --   --  95  --  92  --  91   PLATELETS Thousands/uL 152  --   --  143* 134* 131*  --   --  81*  --  92*  --  86*   MCH pg 29 4  --   --  30 1 29 7 29 9  --   --  29 7  --  29 6  --  29 5   MCHC g/dL 29 9*  --   --  30 6* 29 9* 30 8*  --   --  31 3*  --  32 0  --  32 5   RDW % 16 3*  --   --  16 2* 16 3* 16 6*  --   --  16 5*  --  16 4*  --  16 3*   MPV fL 11 1  --   --  11 7 11 0 11 7  --   --  11 3  --  10 6  --  10 9   NRBC AUTO /100 WBCs 0  --   --  0  --  0  --   --  0  --  0  --  0   NRBC /100 WBC  --   --   --   --   --   --   --   --  1  --   --   --   --     < > = values in this interval not displayed          Results from last 7 days   Lab Units 03/02/19  0617 03/01/19  0618 02/28/19  0511   MAGNESIUM mg/dL 1 9 2 0 2 2 INR:   Results from last 7 days   Lab Units 03/01/19  1715 02/24/19  0559 02/23/19  1839   INR  1 28* 1 26* 1 26*       Lipid Profile:   Lab Results   Component Value Date    CHOL 146 07/18/2014    CHOL 145 02/21/2014     Lab Results   Component Value Date    HDL 44 02/13/2019    HDL 41 06/02/2018    HDL 52 06/27/2017     Lab Results   Component Value Date    LDLCALC 77 02/13/2019    LDLCALC 57 06/02/2018    LDLCALC 129 (H) 06/27/2017     Lab Results   Component Value Date    TRIG 129 02/13/2019    TRIG 102 06/02/2018    TRIG 71 06/27/2017         Hgb A1c:         Telemetry: personally reviewed, afib, HR currently 150s

## 2019-03-02 NOTE — ASSESSMENT & PLAN NOTE
Vascular Doppler with superficial thrombophlebitis and left IJ thrombus  Discuss with GI, okay to start anticoagulation, vascular surgery consulted, suggesting systemic anticoagulation    Will order Ace wrapping of left upper extremity  Discussed with wife at length, please refer to separate the noted dated March 1st, patient is currently on heparin drip with goal PTT between 60 and 80  His hemoglobin has remained grossly stable  No signs of overt bleeding at this time  Continue with q 6 hours a PTT    Continue with q 6 hours H&H

## 2019-03-02 NOTE — PROGRESS NOTES
HR fluctuating between 120s and 130s, occasionally going into 150s  BP 94/52  Pt asymptomatic  Bundle branch block and a-fib noted on telemetry monitor  Cardiology fellow Aidan Reed  Notified as well as Joselo Crowley with mariela  Instructed to give scheduled po cardizem per cardiology and give prn lopressor when due around 2100 per Herberth Crape  Will continue to monitor

## 2019-03-02 NOTE — ASSESSMENT & PLAN NOTE
Out of ICU he was on dysphagia 1 with nectar thick, advanced to dysphagia 2 with thin liquids at speech pathology recommendation

## 2019-03-03 ENCOUNTER — APPOINTMENT (INPATIENT)
Dept: RADIOLOGY | Facility: HOSPITAL | Age: 59
DRG: 871 | End: 2019-03-03
Payer: COMMERCIAL

## 2019-03-03 LAB
ANION GAP SERPL CALCULATED.3IONS-SCNC: 4 MMOL/L (ref 4–13)
APTT PPP: 112 SECONDS (ref 26–38)
APTT PPP: 62 SECONDS (ref 26–38)
APTT PPP: 63 SECONDS (ref 26–38)
ATRIAL RATE: 300 BPM
BASOPHILS # BLD AUTO: 0.06 THOUSANDS/ΜL (ref 0–0.1)
BASOPHILS NFR BLD AUTO: 1 % (ref 0–1)
BUN SERPL-MCNC: 21 MG/DL (ref 5–25)
CALCIUM SERPL-MCNC: 6.8 MG/DL (ref 8.3–10.1)
CHLORIDE SERPL-SCNC: 102 MMOL/L (ref 100–108)
CO2 SERPL-SCNC: 29 MMOL/L (ref 21–32)
CREAT SERPL-MCNC: 2.87 MG/DL (ref 0.6–1.3)
EOSINOPHIL # BLD AUTO: 1.85 THOUSAND/ΜL (ref 0–0.61)
EOSINOPHIL NFR BLD AUTO: 15 % (ref 0–6)
ERYTHROCYTE [DISTWIDTH] IN BLOOD BY AUTOMATED COUNT: 15.9 % (ref 11.6–15.1)
GFR SERPL CREATININE-BSD FRML MDRD: 23 ML/MIN/1.73SQ M
GLUCOSE SERPL-MCNC: 85 MG/DL (ref 65–140)
HCT VFR BLD AUTO: 24.1 % (ref 36.5–49.3)
HCT VFR BLD AUTO: 24.4 % (ref 36.5–49.3)
HCT VFR BLD AUTO: 24.5 % (ref 36.5–49.3)
HCT VFR BLD AUTO: 24.5 % (ref 36.5–49.3)
HCT VFR BLD AUTO: 24.6 % (ref 36.5–49.3)
HGB BLD-MCNC: 7.3 G/DL (ref 12–17)
HGB BLD-MCNC: 7.4 G/DL (ref 12–17)
HGB BLD-MCNC: 7.4 G/DL (ref 12–17)
HGB BLD-MCNC: 7.5 G/DL (ref 12–17)
HGB BLD-MCNC: 7.6 G/DL (ref 12–17)
IMM GRANULOCYTES # BLD AUTO: >0.5 THOUSAND/UL (ref 0–0.2)
IMM GRANULOCYTES NFR BLD AUTO: 5 % (ref 0–2)
LYMPHOCYTES # BLD AUTO: 0.66 THOUSANDS/ΜL (ref 0.6–4.47)
LYMPHOCYTES NFR BLD AUTO: 5 % (ref 14–44)
MAGNESIUM SERPL-MCNC: 1.8 MG/DL (ref 1.6–2.6)
MCH RBC QN AUTO: 30.2 PG (ref 26.8–34.3)
MCHC RBC AUTO-ENTMCNC: 30.7 G/DL (ref 31.4–37.4)
MCV RBC AUTO: 98 FL (ref 82–98)
MONOCYTES # BLD AUTO: 0.99 THOUSAND/ΜL (ref 0.17–1.22)
MONOCYTES NFR BLD AUTO: 8 % (ref 4–12)
NEUTROPHILS # BLD AUTO: 8.47 THOUSANDS/ΜL (ref 1.85–7.62)
NEUTS SEG NFR BLD AUTO: 66 % (ref 43–75)
NRBC BLD AUTO-RTO: 0 /100 WBCS
P AXIS: 67 DEGREES
PHOSPHATE SERPL-MCNC: 3 MG/DL (ref 2.7–4.5)
PLATELET # BLD AUTO: 141 THOUSANDS/UL (ref 149–390)
PMV BLD AUTO: 11.2 FL (ref 8.9–12.7)
POTASSIUM SERPL-SCNC: 3.2 MMOL/L (ref 3.5–5.3)
QRS AXIS: 93 DEGREES
QRSD INTERVAL: 176 MS
QT INTERVAL: 462 MS
QTC INTERVAL: 445 MS
RBC # BLD AUTO: 2.45 MILLION/UL (ref 3.88–5.62)
SODIUM SERPL-SCNC: 135 MMOL/L (ref 136–145)
T WAVE AXIS: -28 DEGREES
VENTRICULAR RATE: 56 BPM
WBC # BLD AUTO: 12.71 THOUSAND/UL (ref 4.31–10.16)

## 2019-03-03 PROCEDURE — 99231 SBSQ HOSP IP/OBS SF/LOW 25: CPT | Performed by: INTERNAL MEDICINE

## 2019-03-03 PROCEDURE — 85018 HEMOGLOBIN: CPT | Performed by: INTERNAL MEDICINE

## 2019-03-03 PROCEDURE — 93010 ELECTROCARDIOGRAM REPORT: CPT | Performed by: INTERNAL MEDICINE

## 2019-03-03 PROCEDURE — 93005 ELECTROCARDIOGRAM TRACING: CPT

## 2019-03-03 PROCEDURE — 99232 SBSQ HOSP IP/OBS MODERATE 35: CPT | Performed by: INTERNAL MEDICINE

## 2019-03-03 PROCEDURE — 85014 HEMATOCRIT: CPT | Performed by: INTERNAL MEDICINE

## 2019-03-03 PROCEDURE — 84100 ASSAY OF PHOSPHORUS: CPT | Performed by: INTERNAL MEDICINE

## 2019-03-03 PROCEDURE — 85025 COMPLETE CBC W/AUTO DIFF WBC: CPT | Performed by: INTERNAL MEDICINE

## 2019-03-03 PROCEDURE — 83735 ASSAY OF MAGNESIUM: CPT | Performed by: INTERNAL MEDICINE

## 2019-03-03 PROCEDURE — 85730 THROMBOPLASTIN TIME PARTIAL: CPT | Performed by: INTERNAL MEDICINE

## 2019-03-03 PROCEDURE — 94762 N-INVAS EAR/PLS OXIMTRY CONT: CPT

## 2019-03-03 PROCEDURE — 73200 CT UPPER EXTREMITY W/O DYE: CPT

## 2019-03-03 PROCEDURE — 80048 BASIC METABOLIC PNL TOTAL CA: CPT | Performed by: INTERNAL MEDICINE

## 2019-03-03 RX ADMIN — NYSTATIN: 100000 POWDER TOPICAL at 09:26

## 2019-03-03 RX ADMIN — DILTIAZEM HYDROCHLORIDE 30 MG: 30 TABLET, FILM COATED ORAL at 06:38

## 2019-03-03 RX ADMIN — Medication 20 MG: at 19:28

## 2019-03-03 RX ADMIN — NYSTATIN: 100000 POWDER TOPICAL at 19:29

## 2019-03-03 RX ADMIN — HYDROCORTISONE: 1 CREAM TOPICAL at 09:26

## 2019-03-03 RX ADMIN — DILTIAZEM HYDROCHLORIDE 30 MG: 30 TABLET, FILM COATED ORAL at 13:00

## 2019-03-03 RX ADMIN — VANCOMYCIN HYDROCHLORIDE 2000 MG: 1 INJECTION, POWDER, LYOPHILIZED, FOR SOLUTION INTRAVENOUS at 23:50

## 2019-03-03 RX ADMIN — HEPARIN SODIUM 9 UNITS/KG/HR: 10000 INJECTION, SOLUTION INTRAVENOUS at 14:50

## 2019-03-03 RX ADMIN — DILTIAZEM HYDROCHLORIDE 30 MG: 30 TABLET, FILM COATED ORAL at 23:59

## 2019-03-03 RX ADMIN — METRONIDAZOLE 500 MG: 500 INJECTION, SOLUTION INTRAVENOUS at 19:29

## 2019-03-03 RX ADMIN — METOPROLOL TARTRATE 100 MG: 50 TABLET, FILM COATED ORAL at 09:27

## 2019-03-03 RX ADMIN — METOPROLOL TARTRATE 100 MG: 50 TABLET, FILM COATED ORAL at 21:38

## 2019-03-03 RX ADMIN — DILTIAZEM HYDROCHLORIDE 30 MG: 30 TABLET, FILM COATED ORAL at 00:29

## 2019-03-03 RX ADMIN — MELATONIN 6 MG: at 21:38

## 2019-03-03 RX ADMIN — FUROSEMIDE 80 MG: 10 INJECTION, SOLUTION INTRAMUSCULAR; INTRAVENOUS at 09:27

## 2019-03-03 RX ADMIN — SUCRALFATE 1000 MG: 1 SUSPENSION ORAL at 06:40

## 2019-03-03 RX ADMIN — HYDROCORTISONE: 1 CREAM TOPICAL at 19:29

## 2019-03-03 RX ADMIN — Medication 20 MG: at 06:41

## 2019-03-03 RX ADMIN — OXYCODONE HYDROCHLORIDE 7.5 MG: 5 TABLET ORAL at 00:30

## 2019-03-03 RX ADMIN — OXYCODONE HYDROCHLORIDE 7.5 MG: 5 TABLET ORAL at 16:00

## 2019-03-03 RX ADMIN — ATORVASTATIN CALCIUM 40 MG: 40 TABLET, FILM COATED ORAL at 19:28

## 2019-03-03 RX ADMIN — SUCRALFATE 1000 MG: 1 SUSPENSION ORAL at 19:28

## 2019-03-03 RX ADMIN — CEFAZOLIN SODIUM 1000 MG: 10 INJECTION, POWDER, FOR SOLUTION INTRAVENOUS at 21:38

## 2019-03-03 NOTE — PROGRESS NOTES
Surgery Progress note    Was called to evaluated patient due to persistent left upper extremity pain and swelling  The patient states he has been having problems with his left arm for months however during this hospitalization he was found to have a L IJ DVT  He has been on a heparin gtt for 3 days however his arm is still swollen and sore  On exam he has sensation intact, and a strong radial pulse  There is some ecchymosis of the arm, especially on the inferior/dependent portion  Motor is intact although limited by hand/finger edema  Arm is swollen but compartments feel soft  Tender to the touch moreso in the lower arm over the areas of ecchymosis  Venous duplex obtained 3/1shows DVT in IJ, with non occlusive vs subacute superficial thrombophlebitis in the cephalic vein       Plan:  - No acute surgical intervention  - Recommend continued elevation, warm compress  - Continue heparin gtt  - Will f/u CT arm as ordered by primary service

## 2019-03-03 NOTE — QUICK NOTE
CT scan left upper extremity reported with superficial and deep cellulitis and myositis  Discussed with infectious disease, continue with cefazolin at current dose, add Flagyl, provide 1 time dose of vancomycin tonight and then discuss in the morning with provider if that should be continued after dialysis  Discussed with orthopedic surgery  Asked to evaluate the patient upper extremity  Pending recommendations      Addendum:  Discussed with orthopedic surgeon on call  At this time as patient was evaluated by General surgery in the morning and found to have no evidence of compartment syndrome there is no indication for any orthopedic surgical intervention at this time or evaluation  CT scan does not show any drainable collection at this time  Suggestion was made to use seed bag sling significantly elevate upper extremity  Will discuss further more with Infectious Disease in the morning, as MRI could be consideration if still no improvement in the next 24 hours to be coordinated with Nephrology before dialysis on dialysis today

## 2019-03-03 NOTE — PLAN OF CARE
Problem: Potential for Falls  Goal: Patient will remain free of falls  Description  INTERVENTIONS:  - Assess patient frequently for physical needs  -  Identify cognitive and physical deficits and behaviors that affect risk of falls  -  [place] fall precautions as indicated by assessment   - Educate patient/family on patient safety including physical limitations  - Instruct patient to call for assistance with activity based on assessment  - Modify environment to reduce risk of injury  - Consider OT/PT consult to assist with strengthening/mobility    Outcome: Progressing     Problem: Prexisting or High Potential for Compromised Skin Integrity  Goal: Skin integrity is maintained or improved  Description  INTERVENTIONS:  - Identify patients at risk for skin breakdown  - Assess and monitor skin integrity  - Assess and monitor nutrition and hydration status  - Monitor labs (i e  albumin)  - Assess for incontinence   - Turn and reposition patient  - Assist with mobility/ambulation  - Relieve pressure over bony prominences  - Avoid friction and shearing  - Provide appropriate hygiene as needed including keeping skin clean and dry  - Evaluate need for skin moisturizer/barrier cream  - Collaborate with interdisciplinary team (i e  Nutrition, Rehabilitation, etc )   - Patient/family teaching   Outcome: Progressing     Problem: Nutrition/Hydration-ADULT  Goal: Nutrient/Hydration intake appropriate for improving, restoring or maintaining nutritional needs  Description  Monitor and assess patient's nutrition/hydration status for malnutrition (ex- brittle hair, bruises, dry skin, pale skin and conjunctiva, muscle wasting, smooth red tongue, and disorientation)  Collaborate with interdisciplinary team and initiate plan and interventions as ordered  Monitor patient's weight and dietary intake as ordered or per policy  Utilize nutrition screening tool and intervene per policy   Determine patient's food preferences and provide high-protein, high-caloric foods as appropriate  INTERVENTIONS:  - Monitor oral intake, urinary output, labs, and treatment plans  - Assess nutrition and hydration status and recommend course of action  - Evaluate amount of meals eaten  - Assist patient with eating if necessary   - Allow adequate time for meals  - Recommend/ encourage appropriate diets, oral nutritional supplements, and vitamin/mineral supplements  - Order, calculate, and assess calorie counts as needed  - Recommend, monitor, and adjust tube feedings and TPN/PPN based on assessed needs  - Assess need for intravenous fluids  - Provide specific nutrition/hydration education as appropriate  - Include patient/family/caregiver in decisions related to nutrition   Outcome: Progressing     Problem: CARDIOVASCULAR - ADULT  Goal: Maintains optimal cardiac output and hemodynamic stability  Description  INTERVENTIONS:  - Monitor I/O, vital signs and rhythm  - Monitor for S/S and trends of decreased cardiac output i e  bleeding, hypotension  - Administer and titrate ordered vasoactive medications to optimize hemodynamic stability  - Assess quality of pulses, skin color and temperature  - Assess for signs of decreased coronary artery perfusion - ex   Angina  - Instruct patient to report change in severity of symptoms   Outcome: Progressing  Goal: Absence of cardiac dysrhythmias or at baseline rhythm  Description  INTERVENTIONS:  - Continuous cardiac monitoring, monitor vital signs, obtain 12 lead EKG if indicated  - Administer antiarrhythmic and heart rate control medications as ordered  - Monitor electrolytes and administer replacement therapy as ordered   Outcome: Progressing     Problem: METABOLIC, FLUID AND ELECTROLYTES - ADULT  Goal: Electrolytes maintained within normal limits  Description  INTERVENTIONS:  - Monitor labs and assess patient for signs and symptoms of electrolyte imbalances  - Administer electrolyte replacement as ordered  - Monitor response to electrolyte replacements, including repeat lab results as appropriate  - Instruct patient on fluid and nutrition as appropriate   Outcome: Progressing  Goal: Fluid balance maintained  Description  INTERVENTIONS:  - Monitor labs and assess for signs and symptoms of volume excess or deficit  - Monitor I/O and WT  - Instruct patient on fluid and nutrition as appropriate   Outcome: Progressing     Problem: PAIN - ADULT  Goal: Verbalizes/displays adequate comfort level or baseline comfort level  Description  Interventions:  - Encourage patient to monitor pain and request assistance  - Assess pain using appropriate pain scale  - Administer analgesics based on type and severity of pain and evaluate response  - Implement non-pharmacological measures as appropriate and evaluate response  - Consider cultural and social influences on pain and pain management  - Notify physician/advanced practitioner if interventions unsuccessful or patient reports new pain   Outcome: Progressing     Problem: INFECTION - ADULT  Goal: Absence or prevention of progression during hospitalization  Description  INTERVENTIONS:  - Assess and monitor for signs and symptoms of infection  - Monitor lab/diagnostic results  - Monitor all insertion sites, i e  indwelling lines, tubes, and drains  - Monitor endotracheal (as able) and nasal secretions for changes in amount and color  - [place] appropriate cooling/warming therapies per order  - Administer medications as ordered  - Instruct and encourage patient and family to use good hand hygiene technique  - Identify and instruct in appropriate isolation precautions for identified infection/condition    Outcome: Progressing     Problem: SAFETY ADULT  Goal: Patient will remain free of falls  Description  INTERVENTIONS:  - Assess patient frequently for physical needs  -  Identify cognitive and physical deficits and behaviors that affect risk of falls    -  [place] fall precautions as indicated by assessment   - Educate patient/family on patient safety including physical limitations  - Instruct patient to call for assistance with activity based on assessment  - Modify environment to reduce risk of injury  - Consider OT/PT consult to assist with strengthening/mobility    Outcome: Progressing  Goal: Maintain or return to baseline ADL function  Description  INTERVENTIONS:  -  Assess patient's ability to carry out ADLs; assess patient's baseline for ADL function and identify physical deficits which impact ability to perform ADLs (bathing, care of mouth/teeth, toileting, grooming, dressing, etc )  - Assess/evaluate cause of self-care deficits   - Assess range of motion  - Assess patient's mobility; develop plan if impaired  - Assess patient's need for assistive devices and provide as appropriate  - Encourage maximum independence but intervene and supervise when necessary  ¯ Involve family in performance of ADLs  ¯ Assess for home care needs following discharge   ¯ Request OT consult to assist with ADL evaluation and planning for discharge  ¯ Provide patient education as appropriate    Outcome: Progressing  Goal: Maintain or return mobility status to optimal level  Description  INTERVENTIONS:  - Assess patient's baseline mobility status (ambulation, transfers, stairs, etc )    - Identify cognitive and physical deficits and behaviors that affect mobility  - Identify mobility aids required to assist with transfers and/or ambulation (gait belt, sit-to-stand, lift, walker, cane, etc )  - [place] fall precautions as indicated by assessment  - Record patient progress and toleration of activity level on Mobility SBAR; progress patient to next Phase/Stage  - Instruct patient to call for assistance with activity based on assessment  - Request Rehabilitation consult to assist with strengthening/weightbearing, etc     Outcome: Progressing     Problem: DISCHARGE PLANNING  Goal: Discharge to home or other facility with appropriate resources  Description  INTERVENTIONS:  - Identify barriers to discharge w/patient and caregiver  - Arrange for needed discharge resources and transportation as appropriate  - Identify discharge learning needs (meds, wound care, etc )  - Arrange for interpretive services to assist at discharge as needed  - Refer to Case Management Department for coordinating discharge planning if the patient needs post-hospital services based on physician/advanced practitioner order or complex needs related to functional status, cognitive ability, or social support system   Outcome: Progressing     Problem: Knowledge Deficit  Goal: Patient/family/caregiver demonstrates understanding of disease process, treatment plan, medications, and discharge instructions  Description  Complete learning assessment and assess knowledge base    Interventions:  - Provide teaching at level of understanding  - Provide teaching via preferred learning methods   Outcome: Progressing     Problem: GENITOURINARY - ADULT  Goal: Maintains or returns to baseline urinary function  Description  INTERVENTIONS:  - Assess urinary function  - Encourage oral fluids to ensure adequate hydration  - Administer IV fluids as ordered to ensure adequate hydration  - Administer ordered medications as needed  - Offer frequent toileting  - Follow urinary retention protocol if ordered   Outcome: Progressing  Goal: Absence of urinary retention  Description  INTERVENTIONS:  - Assess patient?s ability to void and empty bladder  - Monitor I/O  - Bladder scan as needed  - Discuss with physician/AP medications to alleviate retention as needed  - Discuss catheterization for long term situations as appropriate   Outcome: Progressing     Problem: DISCHARGE PLANNING - CARE MANAGEMENT  Goal: Discharge to post-acute care or home with appropriate resources  Description  INTERVENTIONS:  - Conduct assessment to determine patient/family and health care team treatment goals, and need for post-acute services based on payer coverage, community resources, and patient preferences, and barriers to discharge  - Address psychosocial, clinical, and financial barriers to discharge as identified in assessment in conjunction with the patient/family and health care team  - Arrange appropriate level of post-acute services according to patient's   needs and preference and payer coverage in collaboration with the physician and health care team  - Communicate with and update the patient/family, physician, and health care team regarding progress on the discharge plan  - Arrange appropriate transportation to post-acute venues  - Pt to d/c with appropriate resources when medically stable     Outcome: Progressing

## 2019-03-03 NOTE — ASSESSMENT & PLAN NOTE
Present on admission, ongoing  Patient required CCRT, currently on hemodialysis Tuesday, Thursday, Saturday  Nephrology continues to follow  Right PermCath in place  Continue was also with IV Lasix as per Nephrology recommendation, so far no significant recovered  Appreciated eosinophiles in the urine, unlikely to be secondary to Carafate or PPI, likely to be secondary to antibiotics, most likely this is related to amiodarone use    It is expected for amiodarone to have prolonged 1/2 life the patient did have severe rash after administration of medication  He has already dialysis set up as an outpatient once stable for discharge to rehabilitation

## 2019-03-03 NOTE — PROGRESS NOTES
Progress Note - Delfino Raymond 1960, 60 y o  male MRN: [id number]    Unit/Bed#: [facility] 834-01 Encounter: [id number]    Primary Care Provider: Frances Person,    Date and time admitted to hospital: 1/24/2019  2:42 PM        Dysphagia  Assessment & Plan  Out of ICU he was on dysphagia 1 with nectar thick, advanced to dysphagia 2 with thin liquids at speech pathology recommendation    Deep tissue injury  Assessment & Plan  First toe on the left  Appreciated Podiatry input, improving over time, continue with clinical monitoring  No further podiatry needs while in the hospital    GI bleed  Assessment & Plan  Patient transfer from ICU on February 27  GI bleed with hypovolemic/hemorrhagic shock requiring also intubation with extubation on February 26  He underwent EGD on February 22nd positive for large gastric ulcer which was cauterized, injected with epinephrine, and also clip was applied  He underwent another endoscopy without intervention on February 23 and again on February 25 without new signs of bleeding  He underwent colonoscopy on February 25th revealing small area of ischemic colitis and he underwent also colonic decompression at that time  He will remain on b i d  PPI for 8 weeks  Status post multiple transfusion, today hemoglobin remained stable while on heparin drips  Hemoglobin is fluctuating around 7 5  Last measurement 7 3  This could be also related to lab viability  Overall in the 24 hours has been stable  Continue with q 6 hours H& H  There is no signs of GI bleed  Patient is non symptomatic hemodynamically stable  Heparin drip infusing with a PTT above goal twice in 24 hour  Protocol has been modified to lower the amount of bolus allowed and to increase the dose reduction for supratherapeutic a PTT  Continue to keep a PTT between 60 and 80  GI has signed off the case, to be reconsulted p r n   If any issues  Also, as per consultant, okay to restart aspirin if indicated, if patient in need for systemic anticoagulation for the left IJ thrombus okay to start anticoagulation although patient remains high risk for rebleeding    He will need outpatient follow-up with EGD in 6-8 weeks, also will need repeat colonoscopy for ischemic colitis in 6-8 weeks given poor preparation    Hypovolemic shock [facility])  Assessment & Plan  Resolved    Acute on chronic renal failure [facility])  Assessment & Plan  Present on admission, ongoing  Patient required CCRT, currently on hemodialysis Tuesday, Thursday, Saturday  Nephrology continues to follow  Right PermCath in place  Continue was also with IV Lasix as per Nephrology recommendation, so far no significant recovered  Appreciated eosinophiles in the urine, unlikely to be secondary to Carafate or PPI, likely to be secondary to antibiotics, most likely this is related to amiodarone use    It is expected for amiodarone to have prolonged 1/2 life the patient did have severe rash after administration of medication  He has already dialysis set up as an outpatient once stable for discharge to rehabilitation    Skin rash  Assessment & Plan  Likely related to amiodarone, resolved    Toxic metabolic encephalopathy  Assessment & Plan  Appears resolved, patient is cooperative, although patient appears to have a very superficial understanding of his medical conditions at this time, discussed with wife, underlying learning disability    Cerebrovascular accident [facility])  Assessment & Plan  Patient underwent CT of the head at the end of January and then again beginning of February 2019, he was found to have abnormalities of put parietal occipital lobes, with suggestion of subacute strokes on latest CT scan  As per review of records, he was evaluated by neurologist and originally an MRI of the brain was requested although, at later time, he was decided to for sake MRI as an inpatient and if patient following up as outpatient with Stroke Clinic  Remains on atorvastatin  No aspirin as per now given recent GI bleed in currently anticoagulated with heparin drips  Monitor clinically      Acute blood loss anemia  Assessment & Plan  Secondary to upper GI bleed, please refer to principal plan    Thrombocytopenia (HCC)  Assessment & Plan  Resolved  Latest Measurement 141, there is some minor day today variation on the measurement, overall although remains stable    MSSA bacteremia  Assessment & Plan  Remains on IV cefazolin as per Infectious Disease recommendation, last dose of antibiotics March 10  MSSA bacteremia was present on admission at the beginning of hospital stay, he underwent extensive workup, including transesophageal echocardiogram without evidence of endocarditis  Infectious Disease continues to follow    Persistent atrial fibrillation ([address] )  Assessment & Plan  With episode of atrial flutter while in the ICU  He was started on amiodarone which was discontinued secondary to drug rash  Continue with beta-blocker, Cardizem, cardiologist continues to follow, today, heart rate on the monitor 40, EKG was obtained, atrial flutter with heart rate around 50  This was discussed with Cardiology, okay to continue with same medical management unchanged and holding Cardizem and metoprolol for heart rate less than 50   If heart rate remains difficult to control as patient goes between 150 and 40 consideration for evaluation by electrophysiology   Not a candidate for digoxin secondary to acute kidney injury  Anticoagulation with heparin drips      Acute respiratory failure with hypoxia (HCC)  Assessment & Plan  Resolved, currently off oxygen  Likely multifactorial secondary to hemorrhagic shock, acute kidney injury requiring dialysis, MSSA bacteremia, obesity, prolonged immobilization  Will continue to monitor closely    Stress-induced cardiomyopathy  Assessment & Plan  Volume controlled with hemodialysis, remains a beta-blocker, cardiology continues to follow    * Left arm swelling  Assessment & Plan  Vascular Doppler with superficial thrombophlebitis and left IJ thrombus  Discuss with GI, okay to start anticoagulation, vascular surgery consulted, suggesting systemic anticoagulation    Will order Ace wrapping of left upper extremity  Discussed with wife at length, please refer to separate the noted dated March 1st, patient is currently on heparin drip with goal PTT between 60 and 80  His hemoglobin has remained grossly stable  No signs of overt bleeding at this time  Continue with q 6 hours a PTT  Continue with H&H q 6 hours    Although lower extremity and right upper arm edema is improving with dialysis, left upper extremity remains significantly edematous  Discussed with General surgery, unlikely to be compartment syndrome developing at this time  Although will obtain CT scan of the left upper extremity  For now will continue to elevate left upper extremity, offer pain medications p r n , apply warm compresses        VTE Pharmacologic Prophylaxis: yes  Pharmacologic: Heparin Drip  Mechanical VTE Prophylaxis in Place: Yes    Patient Centered Rounds: I have performed bedside rounds with nursing staff today  Discussions with Specialists or Other Care Team Provider:  General surgery, nephrology    Education and Discussions with Family / Patient:  Patient, wife advised me not to call her every day unless important updates  Although tomorrow being my last day on service I will call with 1 last follow-up    Time Spent for Care: 45 minutes  More than 50% of total time spent on counseling and coordination of care as described above  Current Length of Stay: 38 day(s)    Current Patient Status: Inpatient   Certification Statement: The patient will continue to require additional inpatient hospital stay due to Please refer to above    Discharge Plan:  Rehabilitation when medically    Code Status: Level 1 - Full Code      Subjective:   Feeling well at this moment    Complains of left upper extremity pain which is better control with current dose of oxycodone  Early in the day had minor episode of nausea which is currently resolved  No vomiting  No abdominal or epigastric pain  Objective:     Vitals:   Temp (24hrs), Av 2 °F (36 8 °C), Min:97 6 °F (36 4 °C), Max:98 5 °F (36 9 °C)    Temp:  [97 6 °F (36 4 °C)-98 5 °F (36 9 °C)] 98 3 °F (36 8 °C)  HR:  [] 63  Resp:  [18-20] 20  BP: ()/(50-94) 111/60  SpO2:  [92 %-98 %] 98 %  Body mass index is 55 08 kg/m²  Input and Output Summary (last 24 hours): Intake/Output Summary (Last 24 hours) at 3/3/2019 1508  Last data filed at 3/3/2019 1426  Gross per 24 hour   Intake 607 6 ml   Output 0 ml   Net 607 6 ml       Physical Exam:     Physical Exam   Constitutional: He is oriented to person, place, and time  He appears well-developed  No distress  HENT:   Head: Normocephalic and atraumatic  Cardiovascular: Normal rate, regular rhythm and normal heart sounds  Exam reveals no friction rub  No murmur heard  Pulmonary/Chest: Effort normal and breath sounds normal  No respiratory distress  He has no wheezes  He has no rales  Abdominal: Soft  Bowel sounds are normal  He exhibits no distension  There is no tenderness  There is no guarding  Musculoskeletal: He exhibits edema  +1 to 2 edema right upper extremity, +4 left upper extremity, +3 bilateral lower extremities   Neurological: He is alert and oriented to person, place, and time  Skin: No erythema  Bruising of the left upper extremity noted   Psychiatric: He has a normal mood and affect   Judgment and thought content normal        Additional Data:     Labs:    Results from last 7 days   Lab Units 19  1407 19  0624   WBC Thousand/uL  --  12 71*   HEMOGLOBIN g/dL 7 3* 7 4*   HEMATOCRIT % 24 5* 24 1*   PLATELETS Thousands/uL  --  141*   NEUTROS PCT %  --  66   LYMPHS PCT %  --  5*   MONOS PCT %  --  8   EOS PCT %  --  15*     Results from last 7 days   Lab Units 19  0624   POTASSIUM mmol/L 3 2* CHLORIDE mmol/L 102   CO2 mmol/L 29   BUN mg/dL 21   CREATININE mg/dL 2 87*   CALCIUM mg/dL 6 8*     Results from last 7 days   Lab Units 03/01/19  1715   INR  1 28*       * I Have Reviewed All Lab Data Listed Above  * Additional Pertinent Lab Tests Reviewed: All Labs Within Last 24 Hours Reviewed    Imaging:    Imaging Reports Reviewed Today Include:  Pending CT scan left upper extremity  Imaging Personally Reviewed by Myself Includes:  None    Recent Cultures (last 7 days):     Results from last 7 days   Lab Units 03/01/19  1441   C DIFF TOXIN B  NEGATIVE for C difficle toxin by PCR          Last 24 Hours Medication List:     Current Facility-Administered Medications:  acetaminophen 650 mg Oral Q6H PRN KATHY Tapia    atorvastatin 40 mg Oral Daily With KATHY [place]    bisacodyl 10 mg Rectal Daily PRN KATHY Tapia    cefazolin 1,000 mg Intravenous Q24H KATHY Tapia Last Rate: 1,000 mg (03/02/19 1839)   diltiazem 30 mg Oral Q6H [address] Josh Damon MD    furosemide 80 mg Intravenous BID (diuretic) Lorraine Kruse MD    heparin (porcine) 3-30 Units/kg/hr (Order-Specific) Intravenous Titrated oJsh Damon MD Last Rate: 9 Units/kg/hr (03/03/19 1450)   heparin (porcine) 2,500 Units Intravenous PRN Josh Damon MD    heparin (porcine) 5,000 Units Intravenous PRN Josh Damon MD    hydrocortisone  Topical BID KATHY Tapia    melatonin 6 mg Oral HS Leidy Martinez PA-C    metoprolol 5 mg Intravenous Q6H PRN Josh Damon MD    metoprolol tartrate 100 mg Oral Q12H Doron Harrington MD    nystatin  Topical BID KATHY Tapia    omeprazole (PRILOSEC) suspension 2 mg/mL 20 mg Oral BID AC Kristal BondKATHY jacques    oxyCODONE 5 mg Oral Q4H PRN Josh Damon MD    oxyCODONE 7 5 mg Oral Q4H PRN Josh Damon MD    polyvinyl alcohol 1 drop Both Eyes Q3H PRN KATHY Tapia    sucralfate 1,000 mg Oral BID AC KATHY Tapia         Today, Patient Was Seen By: Jeannette Ellison MD    ** Please Note: Dragon 360 Dictation voice to text software may have been used in the creation of this document   **

## 2019-03-03 NOTE — ASSESSMENT & PLAN NOTE
Appears resolved, patient is cooperative, although patient appears to have a very superficial understanding of his medical conditions at this time, discussed with wife, underlying learning disability

## 2019-03-03 NOTE — ASSESSMENT & PLAN NOTE
With episode of atrial flutter while in the ICU  He was started on amiodarone which was discontinued secondary to drug rash  Continue with beta-blocker, Cardizem, cardiologist continues to follow, today, heart rate on the monitor 40, EKG was obtained, atrial flutter with heart rate around 50  This was discussed with Cardiology, okay to continue with same medical management unchanged and holding Cardizem and metoprolol for heart rate less than 50   If heart rate remains difficult to control as patient goes between 150 and 40 consideration for evaluation by electrophysiology   Not a candidate for digoxin secondary to acute kidney injury  Anticoagulation with heparin drips

## 2019-03-03 NOTE — UTILIZATION REVIEW
Continued Stay Review    Date: 3/2/19 INPATIENT (completed on 3/3)    Vital Signs:   03/02/19 2104  105  98/54     03/02/19 1901 98 5 °F (36 9 °C) 104 18 122/94 96 % Nasal cannula 2L   03/02/19 1815    94/52     03/02/19 1619    98/50     03/02/19 1518  125  102/56     03/02/19 1218  74 18 92/53 97 % Nasal cannula 2L   03/02/19 0930  78  95/47     03/02/19 0830  99  80/46       Assessment/Plan: 59 y/o male initially admitted as inpatient on 1/24 due to sepsis, acute encephalopathy, PATRICK, respiratory distress requiring mechanical ventilation  GI bleed with hypovolemic/hemorrhagic shock requiring intubation   3/1 S/P RIJ central line insertion  3/2 Pt with +3 B/L LE edema and +2 RUE edema  Pt remains on heparin gtt  Receiving hemodialysis T,Th,Sat  Pt tachycardic today  MSSA bacteremia  Continue IV ancef through 3/10 per ID  Continue Q6h H/H  Will need repeat EGD/colonoscopy  Remains at high risk for bleeding  Continue dysphagia diet per speech recommendation      Medications:   Scheduled Meds:   Current Facility-Administered Medications:  acetaminophen 650 mg Oral Q6H PRN   atorvastatin 40 mg Oral Daily With Dinner   bisacodyl 10 mg Rectal Daily PRN   cefazolin 1,000 mg Intravenous Q24H   diltiazem 30 mg Oral Q6H ARACELIS   furosemide 80 mg Intravenous BID (diuretic)   heparin (porcine) 3-30 Units/kg/hr (Order-Specific) Intravenous Titrated   heparin (porcine) 2,500 Units Intravenous PRN   heparin (porcine) 5,000 Units Intravenous PRN   hydrocortisone  Topical BID   melatonin 6 mg Oral HS   metoprolol 5 mg Intravenous Q6H PRN x1   metoprolol tartrate 100 mg Oral Q12H ARACELIS   nystatin  Topical BID   omeprazole (PRILOSEC) suspension 2 mg/mL 20 mg Oral BID AC   oxyCODONE 5 mg Oral Q4H PRN x2   oxyCODONE 7 5 mg Oral Q4H PRN x1   polyvinyl alcohol 1 drop Both Eyes Q3H PRN   sucralfate 1,000 mg Oral BID AC     Pertinent Labs/Diagnostic Results:   GFR 22  K+ 3 1  BUN/Cr 22/3 02  Ca 7 0  WBC 12 68  H/H 7 5/25 1  Segs 82  Bands 1    3/1 S/P placement of a 16 cm right neck double lumen Power PICC  3/1 Upper extremity venous duplex:  Impression  RIGHT UPPER LIMB LIMITED:  Evaluation shows no evidence of thrombus in the internal jugular vein,  subclavian vein, and the brachiocephalic vein  LEFT UPPER LIMB:  Evidence of non-occlusive acute deep vein thrombosis noted in the internal  jugular vein  Evidence of non-occlusive acute vs subacute superficial thrombophlebitis noted  in the cephalic vein from antecubital fossa to the proximal upper arm  Doppler evaluation shows a normal response to augmentation maneuvers  2/28 EKG:  Atrial fibrillation with rapid ventricular response  Left bundle branch block  Right axis deviation    Age/Sex: 60 y o  male   Discharge Plan: TBD; D/C to rehab when medically stable    Network Utilization Review Department  Phone: [phone]; Fax [phone]  [email or web address]  ATTENTION: Please call with any questions or concerns to [phone]  and carefully listen to the prompts so that you are directed to the right person  Send all requests for admission clinical reviews, approved or denied determinations and any other requests to fax [phone]   All voicemails are confidential

## 2019-03-03 NOTE — PROGRESS NOTES
Progress Note - Cardiology   Yue Martínez 60 y o  male MRN: [id number]  Encounter: [id number]  03/03/19  9:37 AM        Assessment/Plan:    1  Paroxysmal afib/flutter  Started back on Metoprolol tartrate 25 bid 2/25, increased to 50 bid, then 100 bid 2/27 for rate control  Had been up to 100 bid prior to GI bleed  Overall HR controlled, but elevated episodes at times  Amiodarone felt to cause a rash, so it was stopped  Not a good candidate for digoxin due to PATRICK requiring HD  Started Diltiazem 30 PO q6hrs 3/1/19, overall HR controlled, limited by overall lower BPs  Anticoagulation (warfarin) held due to recent GI bleeding and persistent anemia  As per discussion between GI and patient's outpt cardiologist Dr Elizabeth Torres, will not resume Coumadin at this time, although currently started on IV heparin presumably for IJ thrombus  Last transfusion 2/26      2  Stress cardiomyopathy when originally admitted in setting of bacteremia, resolved by echo 2/19/19 showing EF 55%  3  PATRICK requiring CVVH/ESRD  Underwent HD 2/28 and then today  Has significant net body overload  Renal following, for now T/Th/Sat HD as needed  4  LBBB  Chronic, stable  5  BioAVR  No vegetation by CHON  On IV Kefzol for MSSA bacteremia  6  HTN  BP controlled to low on PO Metoprolol and cardizem  7  MSSA bacteremia  On Kefzol IV  Follows with Dr Elizabeth Torres as outpt  Subjective/Objective   Chief Complaint: No chief complaint on file  Subjective: 60 y o  with a history of AS s/p bioAVR, HTN, HL, LBBB, originally admitted 1/24/19 to [facility] with chest pain and SOB, found to be in SVT by EMS, on initial assessment also found to be septic requiring pressors, as well as acute hypoxic respiratory failure requiring intubation  He was found to have MSSA bacteremia, was transferred to [facility] for further management  Echo showed decline in LV function to 30%, from prior 50%   He was treated with milrinone as well as pressors as it was felt he had mixed cardiogenic/septic shock  He required CVVH for PATRICK  CHON showed no vegetation  He also developed new afib/flutter which was treated with amiodarone  Milrinone was eventually able to be weaned off, as were pressors  Metoprolol was started in early 2/19  He was able to be extubated 2/6/19  Metoprolol was titrated up as tolerated for rate control  Repeat echo 2/19/19 showed EF normalized to 55%  He developed a diffuse body rash 2/20/19, so amiodarone was stopped due to concern that it was causing rash  He had issues with hypotension 2/21 requiring shorter dialysis session, then developed significant GI bleeding overnight 2/21-2/22, EGD showed large antral ulcer which was treated with clipping/injection  In setting of GI bleed required reintubation and pressor support, pressors were able to be stopped once bleeding resolved  Colonoscopy 2/25 showing ischemic colitis  Denies feeling short of breath, but has not gotten out of bed  No significant fevers  Denies CP, no palpitations  Overall body edema  On IV heparin now, patient reports for IJ thrombus       Patient Active Problem List   Diagnosis    Aortic stenosis, mild    Essential hypertension    Hyperlipidemia    Left bundle branch block    Murmur    Osteoarthritis of both knees    Pericardial disease    Sciatica    Age-related cataract of right eye    Venous insufficiency    Bilateral leg edema    Stress-induced cardiomyopathy    Acute respiratory failure with hypoxia ([address] )    History of aortic valve replacement with bioprosthetic valve    Persistent atrial fibrillation (HCC)    MSSA bacteremia    Thrombocytopenia (HCC)    Acute blood loss anemia    Leukocytosis    Cerebrovascular accident ([address] )    Toxic metabolic encephalopathy    Skin rash    Acute on chronic renal failure (HCC)    Hypovolemic shock (HCC)    GI bleed    Coagulopathy (HCC)    GI bleeding    Age-related nuclear cataract, bilateral    Open angle with borderline findings, low risk, bilateral    Presence of intraocular lens    Vitreous degeneration of both eyes    Left arm swelling    Deep tissue injury    Dysphagia     Past Medical History:   Diagnosis Date    Allergic rhinitis     last assessed 9/12/12    Finger fracture, right     Closed fx of the middle phalanx of the right 5th finger  last assessed 1/30/14    Hemorrhoids     last assessed 2/10/14    Hyperlipidemia     Hypertension        Allergies   Allergen Reactions    Amiodarone      Developed Rash    Penicillins Rash     However, has subsequently tolerated Cefazolin and Cefepime       Current Facility-Administered Medications   Medication Dose Route Frequency Provider Last Rate Last Dose    acetaminophen (TYLENOL) tablet 650 mg  650 mg Oral Q6H PRN KATHY Elaine   650 mg at 03/01/19 6870    atorvastatin (LIPITOR) tablet 40 mg  40 mg Oral Daily With KATHY Baker   40 mg at 03/02/19 1830    bisacodyl (DULCOLAX) rectal suppository 10 mg  10 mg Rectal Daily PRN KATHY Elaine        ceFAZolin (ANCEF) 1 g in sodium chloride 0 9% 50 ml IVPB  1,000 mg Intravenous Q24H KATHY Elaine 100 mL/hr at 03/02/19 1839 1,000 mg at 03/02/19 1839    diltiazem (CARDIZEM) tablet 30 mg  30 mg Oral Q6H [facility] Kyleigh Rhodes MD   30 mg at 03/03/19 4681    furosemide (LASIX) injection 80 mg  80 mg Intravenous BID (diuretic) Nick Madden MD   80 mg at 03/03/19 3030    heparin (porcine) 25,000 units in 250 mL infusion (premix)  3-30 Units/kg/hr (Order-Specific) Intravenous Titrated Alisa Patel MD 11 3 mL/hr at 03/03/19 0122 9 Units/kg/hr at 03/03/19 0122    heparin (porcine) injection 2,500 Units  2,500 Units Intravenous PRN Alisa Patel MD        heparin (porcine) injection 5,000 Units  5,000 Units Intravenous PRN Alisa Patel MD        hydrocortisone 1 % cream   Topical BID KATHY Elaine        melatonin tablet 6 mg  6 mg Oral HS Leidy Martinez PA-C   6 mg at 03/02/19 2108    metoprolol (LOPRESSOR) injection 5 mg  5 mg Intravenous Q6H PRN Justyna Dumas MD   5 mg at 03/02/19 1150    metoprolol tartrate (LOPRESSOR) tablet 100 mg  100 mg Oral Q12H Doron Harrington MD   100 mg at 03/03/19 7514    nystatin (MYCOSTATIN) powder   Topical BID KATHY Walker        omeprazole (PRILOSEC) suspension 2 mg/mL  20 mg Oral BID AC Kristal Varun, KENIANP   20 mg at 03/03/19 0641    oxyCODONE (ROXICODONE) IR tablet 5 mg  5 mg Oral Q4H PRN Justyna Dumas MD        oxyCODONE (ROXICODONE) IR tablet 7 5 mg  7 5 mg Oral Q4H PRN Justyna Dumas MD   7 5 mg at 03/03/19 0030    polyvinyl alcohol (LIQUIFILM TEARS) 1 4 % ophthalmic solution 1 drop  1 drop Both Eyes Q3H PRN KATHY Walker   1 drop at 02/13/19 1724    sucralfate (CARAFATE) oral suspension 1,000 mg  1,000 mg Oral BID AC Kristal Bond, KENIANP   1,000 mg at 03/03/19 0640       Vitals: /63   Pulse 79   Temp 97 6 °F (36 4 °C) (Oral)   Resp 20   Ht 5' 9" (1 753 m)   Wt 74 8 kg (164 lb 12 8 oz)   SpO2 92%   BMI 24 34 kg/m²     Intake/Output Summary (Last 24 hours) at 3/3/2019 0937  Last data filed at 3/2/2019 1849  Gross per 24 hour   Intake 975 6 ml   Output 2000 ml   Net -1024 4 ml     Wt Readings from Last 3 Encounters:   03/03/19 74 8 kg (164 lb 12 8 oz)   01/24/19 (!) 154 kg (339 lb 8 1 oz)   06/11/18 (!) 155 kg (341 lb)       Body mass index is 24 34 kg/m²  ,     Vitals:    03/03/19 0300 03/03/19 0638 03/03/19 0658 03/03/19 0927   BP: 97/53 100/59 102/59 118/63   Pulse: 75  76 79   Patient Position - Orthostatic VS: Lying  Lying        Physical Exam:     GEN: awake, alert, in no acute distress  HEENT: Sclera anicteric, conjunctivae pink, mucous membranes moist   NECK: Supple, no carotid bruits, no significant JVD  HEART: irregular rhythm, HR controlled currently, II/VI systolic murmur, no clicks, gallops or rubs     LUNGS: Clear to auscultation anteriorly bilaterally; no wheezes, rales, or rhonchi ABDOMEN: Obese, soft, nontender, nondistended, normoactive bowel sounds  EXTREMITIES: Skin warm and well perfused, no clubbing, cyanosis, positive for edema of legs/arms  NEURO: No focal findings  SKIN: Normal without suspicious lesions on exposed skin  Lab Results:     BMP:  Results from last 7 days   Lab Units 03/03/19  0624 03/02/19  0617 03/01/19  0618 02/28/19  0511 02/27/19  0602 02/26/19  1829 02/26/19  1213   POTASSIUM mmol/L 3 2* 3 1* 3 2* 3 2* 3 9 4 4 3 9   CHLORIDE mmol/L 102 104 106 110* 108 108 108   CO2 mmol/L 29 27 29 24 24 26 27   BUN mg/dL 21 22 14 16 8 6 6   CREATININE mg/dL 2 87* 3 02* 2 19* 2 18* 1 21 0 86 0 76   CALCIUM mg/dL 6 8* 7 0* 7 1* 7 7* 8 2* 8 3 7 9*       CBC:   Results from last 7 days   Lab Units 03/03/19  0624 03/03/19  0129 03/02/19  1914 03/02/19  1317 03/02/19  0617 03/02/19  0018 03/01/19  1715 03/01/19  0618 02/28/19  0511 02/27/19  0602  02/26/19  0632  02/25/19  0530   WBC Thousand/uL 12 71*  --   --   --  12 68*  --   --  12 78* 13 55* 15 35*  --  11 00*  --  12 51*   HEMOGLOBIN g/dL 7 4* 7 5* 7 4* 8 0* 7 5* 7 8* 7 7* 8 2* 8 0* 8 2*   < > 7 6*   < > 8 2*   HEMATOCRIT % 24 1* 24 5* 24 3* 26 3* 25 1* 25 3* 25 9* 26 8* 26 8* 26 6*  --  24 3*  --  25 6*   MCV fL 98  --   --   --  98  --   --  99* 100* 97  --  95  --  92   PLATELETS Thousands/uL 141*  --   --   --  152  --   --  143* 134* 131*  --  81*  --  92*   MCH pg 30 2  --   --   --  29 4  --   --  30 1 29 7 29 9  --  29 7  --  29 6   MCHC g/dL 30 7*  --   --   --  29 9*  --   --  30 6* 29 9* 30 8*  --  31 3*  --  32 0   RDW % 15 9*  --   --   --  16 3*  --   --  16 2* 16 3* 16 6*  --  16 5*  --  16 4*   MPV fL 11 2  --   --   --  11 1  --   --  11 7 11 0 11 7  --  11 3  --  10 6   NRBC AUTO /100 WBCs 0  --   --   --  0  --   --  0  --  0  --  0  --  0   NRBC /100 WBC  --   --   --   --   --   --   --   --   --   --   --  1  --   --     < > = values in this interval not displayed          Results from last 7 days Lab Units 03/03/19  0624 03/02/19  0617 03/01/19  0618   MAGNESIUM mg/dL 1 8 1 9 2 0       INR:   Results from last 7 days   Lab Units 03/01/19  1715   INR  1 28*       Lipid Profile:   Lab Results   Component Value Date    CHOL 146 07/18/2014    CHOL 145 02/21/2014     Lab Results   Component Value Date    HDL 44 02/13/2019    HDL 41 06/02/2018    HDL 52 06/27/2017     Lab Results   Component Value Date    LDLCALC 77 02/13/2019    LDLCALC 57 06/02/2018    LDLCALC 129 (H) 06/27/2017     Lab Results   Component Value Date    TRIG 129 02/13/2019    TRIG 102 06/02/2018    TRIG 71 06/27/2017         Hgb A1c:         Telemetry: personally reviewed, afib, HR currently controlled

## 2019-03-03 NOTE — ASSESSMENT & PLAN NOTE
Resolved  Latest Measurement 141, there is some minor day today variation on the measurement, overall although remains stable

## 2019-03-03 NOTE — ASSESSMENT & PLAN NOTE
Patient transfer from ICU on February 27  GI bleed with hypovolemic/hemorrhagic shock requiring also intubation with extubation on February 26  He underwent EGD on February 22nd positive for large gastric ulcer which was cauterized, injected with epinephrine, and also clip was applied  He underwent another endoscopy without intervention on February 23 and again on February 25 without new signs of bleeding  He underwent colonoscopy on February 25th revealing small area of ischemic colitis and he underwent also colonic decompression at that time  He will remain on b i d  PPI for 8 weeks  Status post multiple transfusion, today hemoglobin remained stable while on heparin drips  Hemoglobin is fluctuating around 7 5  Last measurement 7 3  This could be also related to lab viability  Overall in the 24 hours has been stable  Continue with q 6 hours H& H  There is no signs of GI bleed  Patient is non symptomatic hemodynamically stable  Heparin drip infusing with a PTT above goal twice in 24 hour  Protocol has been modified to lower the amount of bolus allowed and to increase the dose reduction for supratherapeutic a PTT  Continue to keep a PTT between 60 and 80  GI has signed off the case, to be reconsulted p r n   If any issues  Also, as per consultant, okay to restart aspirin if indicated, if patient in need for systemic anticoagulation for the left IJ thrombus okay to start anticoagulation although patient remains high risk for rebleeding    He will need outpatient follow-up with EGD in 6-8 weeks, also will need repeat colonoscopy for ischemic colitis in 6-8 weeks given poor preparation

## 2019-03-03 NOTE — ASSESSMENT & PLAN NOTE
Vascular Doppler with superficial thrombophlebitis and left IJ thrombus  Discuss with GI, okay to start anticoagulation, vascular surgery consulted, suggesting systemic anticoagulation    Will order Ace wrapping of left upper extremity  Discussed with wife at length, please refer to separate the noted dated March 1st, patient is currently on heparin drip with goal PTT between 60 and 80  His hemoglobin has remained grossly stable  No signs of overt bleeding at this time  Continue with q 6 hours a PTT  Continue with H&H q 6 hours    Although lower extremity and right upper arm edema is improving with dialysis, left upper extremity remains significantly edematous  Discussed with General surgery, unlikely to be compartment syndrome developing at this time    Although will obtain CT scan of the left upper extremity  For now will continue to elevate left upper extremity, offer pain medications p r n , apply warm compresses

## 2019-03-03 NOTE — PLAN OF CARE
Problem: Potential for Falls  Goal: Patient will remain free of falls  Description  INTERVENTIONS:  - Assess patient frequently for physical needs  -  Identify cognitive and physical deficits and behaviors that affect risk of falls  -  [place] fall precautions as indicated by assessment   - Educate patient/family on patient safety including physical limitations  - Instruct patient to call for assistance with activity based on assessment  - Modify environment to reduce risk of injury  - Consider OT/PT consult to assist with strengthening/mobility    Outcome: Progressing     Problem: Prexisting or High Potential for Compromised Skin Integrity  Goal: Skin integrity is maintained or improved  Description  INTERVENTIONS:  - Identify patients at risk for skin breakdown  - Assess and monitor skin integrity  - Assess and monitor nutrition and hydration status  - Monitor labs (i e  albumin)  - Assess for incontinence   - Turn and reposition patient  - Assist with mobility/ambulation  - Relieve pressure over bony prominences  - Avoid friction and shearing  - Provide appropriate hygiene as needed including keeping skin clean and dry  - Evaluate need for skin moisturizer/barrier cream  - Collaborate with interdisciplinary team (i e  Nutrition, Rehabilitation, etc )   - Patient/family teaching   Outcome: Progressing     Problem: Nutrition/Hydration-ADULT  Goal: Nutrient/Hydration intake appropriate for improving, restoring or maintaining nutritional needs  Description  Monitor and assess patient's nutrition/hydration status for malnutrition (ex- brittle hair, bruises, dry skin, pale skin and conjunctiva, muscle wasting, smooth red tongue, and disorientation)  Collaborate with interdisciplinary team and initiate plan and interventions as ordered  Monitor patient's weight and dietary intake as ordered or per policy  Utilize nutrition screening tool and intervene per policy   Determine patient's food preferences and provide high-protein, high-caloric foods as appropriate  INTERVENTIONS:  - Monitor oral intake, urinary output, labs, and treatment plans  - Assess nutrition and hydration status and recommend course of action  - Evaluate amount of meals eaten  - Assist patient with eating if necessary   - Allow adequate time for meals  - Recommend/ encourage appropriate diets, oral nutritional supplements, and vitamin/mineral supplements  - Order, calculate, and assess calorie counts as needed  - Recommend, monitor, and adjust tube feedings and TPN/PPN based on assessed needs  - Assess need for intravenous fluids  - Provide specific nutrition/hydration education as appropriate  - Include patient/family/caregiver in decisions related to nutrition   Outcome: Progressing     Problem: CARDIOVASCULAR - ADULT  Goal: Maintains optimal cardiac output and hemodynamic stability  Description  INTERVENTIONS:  - Monitor I/O, vital signs and rhythm  - Monitor for S/S and trends of decreased cardiac output i e  bleeding, hypotension  - Administer and titrate ordered vasoactive medications to optimize hemodynamic stability  - Assess quality of pulses, skin color and temperature  - Assess for signs of decreased coronary artery perfusion - ex   Angina  - Instruct patient to report change in severity of symptoms   Outcome: Progressing  Goal: Absence of cardiac dysrhythmias or at baseline rhythm  Description  INTERVENTIONS:  - Continuous cardiac monitoring, monitor vital signs, obtain 12 lead EKG if indicated  - Administer antiarrhythmic and heart rate control medications as ordered  - Monitor electrolytes and administer replacement therapy as ordered   Outcome: Progressing     Problem: METABOLIC, FLUID AND ELECTROLYTES - ADULT  Goal: Electrolytes maintained within normal limits  Description  INTERVENTIONS:  - Monitor labs and assess patient for signs and symptoms of electrolyte imbalances  - Administer electrolyte replacement as ordered  - Monitor response to electrolyte replacements, including repeat lab results as appropriate  - Instruct patient on fluid and nutrition as appropriate   Outcome: Progressing  Goal: Fluid balance maintained  Description  INTERVENTIONS:  - Monitor labs and assess for signs and symptoms of volume excess or deficit  - Monitor I/O and WT  - Instruct patient on fluid and nutrition as appropriate   Outcome: Progressing     Problem: PAIN - ADULT  Goal: Verbalizes/displays adequate comfort level or baseline comfort level  Description  Interventions:  - Encourage patient to monitor pain and request assistance  - Assess pain using appropriate pain scale  - Administer analgesics based on type and severity of pain and evaluate response  - Implement non-pharmacological measures as appropriate and evaluate response  - Consider cultural and social influences on pain and pain management  - Notify physician/advanced practitioner if interventions unsuccessful or patient reports new pain   Outcome: Progressing     Problem: INFECTION - ADULT  Goal: Absence or prevention of progression during hospitalization  Description  INTERVENTIONS:  - Assess and monitor for signs and symptoms of infection  - Monitor lab/diagnostic results  - Monitor all insertion sites, i e  indwelling lines, tubes, and drains  - Monitor endotracheal (as able) and nasal secretions for changes in amount and color  - [place] appropriate cooling/warming therapies per order  - Administer medications as ordered  - Instruct and encourage patient and family to use good hand hygiene technique  - Identify and instruct in appropriate isolation precautions for identified infection/condition    Outcome: Progressing     Problem: SAFETY ADULT  Goal: Patient will remain free of falls  Description  INTERVENTIONS:  - Assess patient frequently for physical needs  -  Identify cognitive and physical deficits and behaviors that affect risk of falls    -  [place] fall precautions as indicated by assessment   - Educate patient/family on patient safety including physical limitations  - Instruct patient to call for assistance with activity based on assessment  - Modify environment to reduce risk of injury  - Consider OT/PT consult to assist with strengthening/mobility    Outcome: Progressing  Goal: Maintain or return to baseline ADL function  Description  INTERVENTIONS:  -  Assess patient's ability to carry out ADLs; assess patient's baseline for ADL function and identify physical deficits which impact ability to perform ADLs (bathing, care of mouth/teeth, toileting, grooming, dressing, etc )  - Assess/evaluate cause of self-care deficits   - Assess range of motion  - Assess patient's mobility; develop plan if impaired  - Assess patient's need for assistive devices and provide as appropriate  - Encourage maximum independence but intervene and supervise when necessary  ¯ Involve family in performance of ADLs  ¯ Assess for home care needs following discharge   ¯ Request OT consult to assist with ADL evaluation and planning for discharge  ¯ Provide patient education as appropriate    Outcome: Progressing  Goal: Maintain or return mobility status to optimal level  Description  INTERVENTIONS:  - Assess patient's baseline mobility status (ambulation, transfers, stairs, etc )    - Identify cognitive and physical deficits and behaviors that affect mobility  - Identify mobility aids required to assist with transfers and/or ambulation (gait belt, sit-to-stand, lift, walker, cane, etc )  - [place] fall precautions as indicated by assessment  - Record patient progress and toleration of activity level on Mobility SBAR; progress patient to next Phase/Stage  - Instruct patient to call for assistance with activity based on assessment  - Request Rehabilitation consult to assist with strengthening/weightbearing, etc     Outcome: Progressing     Problem: DISCHARGE PLANNING  Goal: Discharge to home or other facility with appropriate resources  Description  INTERVENTIONS:  - Identify barriers to discharge w/patient and caregiver  - Arrange for needed discharge resources and transportation as appropriate  - Identify discharge learning needs (meds, wound care, etc )  - Arrange for interpretive services to assist at discharge as needed  - Refer to Case Management Department for coordinating discharge planning if the patient needs post-hospital services based on physician/advanced practitioner order or complex needs related to functional status, cognitive ability, or social support system   Outcome: Progressing     Problem: Knowledge Deficit  Goal: Patient/family/caregiver demonstrates understanding of disease process, treatment plan, medications, and discharge instructions  Description  Complete learning assessment and assess knowledge base    Interventions:  - Provide teaching at level of understanding  - Provide teaching via preferred learning methods   Outcome: Progressing     Problem: GENITOURINARY - ADULT  Goal: Maintains or returns to baseline urinary function  Description  INTERVENTIONS:  - Assess urinary function  - Encourage oral fluids to ensure adequate hydration  - Administer IV fluids as ordered to ensure adequate hydration  - Administer ordered medications as needed  - Offer frequent toileting  - Follow urinary retention protocol if ordered   Outcome: Progressing  Goal: Absence of urinary retention  Description  INTERVENTIONS:  - Assess patient?s ability to void and empty bladder  - Monitor I/O  - Bladder scan as needed  - Discuss with physician/AP medications to alleviate retention as needed  - Discuss catheterization for long term situations as appropriate   Outcome: Progressing     Problem: DISCHARGE PLANNING - CARE MANAGEMENT  Goal: Discharge to post-acute care or home with appropriate resources  Description  INTERVENTIONS:  - Conduct assessment to determine patient/family and health care team treatment goals, and need for post-acute services based on payer coverage, community resources, and patient preferences, and barriers to discharge  - Address psychosocial, clinical, and financial barriers to discharge as identified in assessment in conjunction with the patient/family and health care team  - Arrange appropriate level of post-acute services according to patient's   needs and preference and payer coverage in collaboration with the physician and health care team  - Communicate with and update the patient/family, physician, and health care team regarding progress on the discharge plan  - Arrange appropriate transportation to post-acute venues  - Pt to d/c with appropriate resources when medically stable     Outcome: Progressing

## 2019-03-04 LAB
ABO GROUP BLD: NORMAL
ALBUMIN SERPL BCP-MCNC: 1.7 G/DL (ref 3.5–5)
ALP SERPL-CCNC: 78 U/L (ref 46–116)
ALT SERPL W P-5'-P-CCNC: <6 U/L (ref 12–78)
ANION GAP SERPL CALCULATED.3IONS-SCNC: 8 MMOL/L (ref 4–13)
APTT PPP: 56 SECONDS (ref 26–38)
APTT PPP: 66 SECONDS (ref 26–38)
APTT PPP: 79 SECONDS (ref 26–38)
AST SERPL W P-5'-P-CCNC: 10 U/L (ref 5–45)
BASOPHILS # BLD AUTO: 0.07 THOUSANDS/ΜL (ref 0–0.1)
BASOPHILS NFR BLD AUTO: 1 % (ref 0–1)
BILIRUB DIRECT SERPL-MCNC: 0.22 MG/DL (ref 0–0.2)
BILIRUB SERPL-MCNC: 0.45 MG/DL (ref 0.2–1)
BLD GP AB SCN SERPL QL: NEGATIVE
BUN SERPL-MCNC: 29 MG/DL (ref 5–25)
CALCIUM SERPL-MCNC: 6.9 MG/DL (ref 8.3–10.1)
CHLORIDE SERPL-SCNC: 101 MMOL/L (ref 100–108)
CK SERPL-CCNC: 29 U/L (ref 39–308)
CO2 SERPL-SCNC: 27 MMOL/L (ref 21–32)
CREAT SERPL-MCNC: 3.57 MG/DL (ref 0.6–1.3)
EOSINOPHIL # BLD AUTO: 1.98 THOUSAND/ΜL (ref 0–0.61)
EOSINOPHIL NFR BLD AUTO: 15 % (ref 0–6)
ERYTHROCYTE [DISTWIDTH] IN BLOOD BY AUTOMATED COUNT: 15.9 % (ref 11.6–15.1)
GFR SERPL CREATININE-BSD FRML MDRD: 18 ML/MIN/1.73SQ M
GLUCOSE SERPL-MCNC: 85 MG/DL (ref 65–140)
HCT VFR BLD AUTO: 23.4 % (ref 36.5–49.3)
HCT VFR BLD AUTO: 23.9 % (ref 36.5–49.3)
HCT VFR BLD AUTO: 24.4 % (ref 36.5–49.3)
HCT VFR BLD AUTO: 26.5 % (ref 36.5–49.3)
HGB BLD-MCNC: 7.2 G/DL (ref 12–17)
HGB BLD-MCNC: 7.2 G/DL (ref 12–17)
HGB BLD-MCNC: 7.4 G/DL (ref 12–17)
HGB BLD-MCNC: 8.2 G/DL (ref 12–17)
IMM GRANULOCYTES # BLD AUTO: >0.5 THOUSAND/UL (ref 0–0.2)
IMM GRANULOCYTES NFR BLD AUTO: 4 % (ref 0–2)
LYMPHOCYTES # BLD AUTO: 0.75 THOUSANDS/ΜL (ref 0.6–4.47)
LYMPHOCYTES NFR BLD AUTO: 6 % (ref 14–44)
MAGNESIUM SERPL-MCNC: 1.9 MG/DL (ref 1.6–2.6)
MCH RBC QN AUTO: 29.6 PG (ref 26.8–34.3)
MCHC RBC AUTO-ENTMCNC: 30.1 G/DL (ref 31.4–37.4)
MCV RBC AUTO: 98 FL (ref 82–98)
MONOCYTES # BLD AUTO: 0.95 THOUSAND/ΜL (ref 0.17–1.22)
MONOCYTES NFR BLD AUTO: 7 % (ref 4–12)
NEUTROPHILS # BLD AUTO: 8.78 THOUSANDS/ΜL (ref 1.85–7.62)
NEUTS SEG NFR BLD AUTO: 67 % (ref 43–75)
NRBC BLD AUTO-RTO: 0 /100 WBCS
PHOSPHATE SERPL-MCNC: 3.5 MG/DL (ref 2.7–4.5)
PLATELET # BLD AUTO: 157 THOUSANDS/UL (ref 149–390)
PMV BLD AUTO: 11.4 FL (ref 8.9–12.7)
POTASSIUM SERPL-SCNC: 3.4 MMOL/L (ref 3.5–5.3)
PROT SERPL-MCNC: 5.7 G/DL (ref 6.4–8.2)
RBC # BLD AUTO: 2.43 MILLION/UL (ref 3.88–5.62)
RH BLD: POSITIVE
SODIUM SERPL-SCNC: 136 MMOL/L (ref 136–145)
SPECIMEN EXPIRATION DATE: NORMAL
VANCOMYCIN SERPL-MCNC: 20.3 UG/ML
WBC # BLD AUTO: 13.09 THOUSAND/UL (ref 4.31–10.16)

## 2019-03-04 PROCEDURE — 85025 COMPLETE CBC W/AUTO DIFF WBC: CPT | Performed by: INTERNAL MEDICINE

## 2019-03-04 PROCEDURE — 86923 COMPATIBILITY TEST ELECTRIC: CPT

## 2019-03-04 PROCEDURE — 94762 N-INVAS EAR/PLS OXIMTRY CONT: CPT

## 2019-03-04 PROCEDURE — 80048 BASIC METABOLIC PNL TOTAL CA: CPT | Performed by: INTERNAL MEDICINE

## 2019-03-04 PROCEDURE — 85014 HEMATOCRIT: CPT | Performed by: INTERNAL MEDICINE

## 2019-03-04 PROCEDURE — 80076 HEPATIC FUNCTION PANEL: CPT | Performed by: INTERNAL MEDICINE

## 2019-03-04 PROCEDURE — 85730 THROMBOPLASTIN TIME PARTIAL: CPT | Performed by: SURGERY

## 2019-03-04 PROCEDURE — 86901 BLOOD TYPING SEROLOGIC RH(D): CPT | Performed by: INTERNAL MEDICINE

## 2019-03-04 PROCEDURE — 83735 ASSAY OF MAGNESIUM: CPT | Performed by: INTERNAL MEDICINE

## 2019-03-04 PROCEDURE — 86850 RBC ANTIBODY SCREEN: CPT | Performed by: INTERNAL MEDICINE

## 2019-03-04 PROCEDURE — 99233 SBSQ HOSP IP/OBS HIGH 50: CPT | Performed by: INTERNAL MEDICINE

## 2019-03-04 PROCEDURE — 82550 ASSAY OF CK (CPK): CPT | Performed by: INTERNAL MEDICINE

## 2019-03-04 PROCEDURE — P9016 RBC LEUKOCYTES REDUCED: HCPCS

## 2019-03-04 PROCEDURE — 85018 HEMOGLOBIN: CPT | Performed by: INTERNAL MEDICINE

## 2019-03-04 PROCEDURE — 80202 ASSAY OF VANCOMYCIN: CPT | Performed by: INTERNAL MEDICINE

## 2019-03-04 PROCEDURE — 86900 BLOOD TYPING SEROLOGIC ABO: CPT | Performed by: INTERNAL MEDICINE

## 2019-03-04 PROCEDURE — 84100 ASSAY OF PHOSPHORUS: CPT | Performed by: INTERNAL MEDICINE

## 2019-03-04 PROCEDURE — 85730 THROMBOPLASTIN TIME PARTIAL: CPT | Performed by: INTERNAL MEDICINE

## 2019-03-04 PROCEDURE — 99232 SBSQ HOSP IP/OBS MODERATE 35: CPT | Performed by: INTERNAL MEDICINE

## 2019-03-04 RX ORDER — METRONIDAZOLE 500 MG/1
500 TABLET ORAL 3 TIMES DAILY
Status: DISCONTINUED | OUTPATIENT
Start: 2019-03-04 | End: 2019-03-06

## 2019-03-04 RX ORDER — MIDODRINE HYDROCHLORIDE 5 MG/1
10 TABLET ORAL
Status: DISCONTINUED | OUTPATIENT
Start: 2019-03-04 | End: 2019-03-16 | Stop reason: HOSPADM

## 2019-03-04 RX ORDER — POTASSIUM CHLORIDE 20 MEQ/1
40 TABLET, EXTENDED RELEASE ORAL ONCE
Status: COMPLETED | OUTPATIENT
Start: 2019-03-04 | End: 2019-03-04

## 2019-03-04 RX ORDER — DIPHENHYDRAMINE HCL 25 MG
25 TABLET ORAL EVERY 6 HOURS PRN
Status: COMPLETED | OUTPATIENT
Start: 2019-03-04 | End: 2019-03-06

## 2019-03-04 RX ORDER — CHOLECALCIFEROL (VITAMIN D3) 10 MCG
1 TABLET ORAL
Status: DISCONTINUED | OUTPATIENT
Start: 2019-03-04 | End: 2019-03-16 | Stop reason: HOSPADM

## 2019-03-04 RX ORDER — METOPROLOL TARTRATE 50 MG/1
50 TABLET, FILM COATED ORAL 3 TIMES DAILY
Status: DISCONTINUED | OUTPATIENT
Start: 2019-03-04 | End: 2019-03-16 | Stop reason: HOSPADM

## 2019-03-04 RX ORDER — METOPROLOL TARTRATE 50 MG/1
50 TABLET, FILM COATED ORAL 3 TIMES DAILY
Status: DISCONTINUED | OUTPATIENT
Start: 2019-03-04 | End: 2019-03-04

## 2019-03-04 RX ADMIN — METRONIDAZOLE 500 MG: 500 TABLET ORAL at 17:09

## 2019-03-04 RX ADMIN — Medication 20 MG: at 05:56

## 2019-03-04 RX ADMIN — METRONIDAZOLE 500 MG: 500 TABLET ORAL at 21:28

## 2019-03-04 RX ADMIN — METRONIDAZOLE 500 MG: 500 INJECTION, SOLUTION INTRAVENOUS at 02:35

## 2019-03-04 RX ADMIN — Medication 1 CAPSULE: at 17:09

## 2019-03-04 RX ADMIN — DIPHENHYDRAMINE HCL 25 MG: 25 TABLET, FILM COATED ORAL at 23:54

## 2019-03-04 RX ADMIN — DILTIAZEM HYDROCHLORIDE 30 MG: 30 TABLET, FILM COATED ORAL at 05:51

## 2019-03-04 RX ADMIN — MELATONIN 6 MG: at 21:28

## 2019-03-04 RX ADMIN — DILTIAZEM HYDROCHLORIDE 30 MG: 30 TABLET, FILM COATED ORAL at 23:54

## 2019-03-04 RX ADMIN — OXYCODONE HYDROCHLORIDE 5 MG: 5 TABLET ORAL at 09:03

## 2019-03-04 RX ADMIN — SUCRALFATE 1000 MG: 1 SUSPENSION ORAL at 05:55

## 2019-03-04 RX ADMIN — HYDROCORTISONE: 1 CREAM TOPICAL at 08:44

## 2019-03-04 RX ADMIN — HYDROCORTISONE: 1 CREAM TOPICAL at 17:09

## 2019-03-04 RX ADMIN — METRONIDAZOLE 500 MG: 500 TABLET ORAL at 08:45

## 2019-03-04 RX ADMIN — CEFAZOLIN SODIUM 1000 MG: 10 INJECTION, POWDER, FOR SOLUTION INTRAVENOUS at 21:28

## 2019-03-04 RX ADMIN — HEPARIN SODIUM 10 UNITS/KG/HR: 10000 INJECTION, SOLUTION INTRAVENOUS at 11:02

## 2019-03-04 RX ADMIN — NYSTATIN: 100000 POWDER TOPICAL at 08:44

## 2019-03-04 RX ADMIN — OXYCODONE HYDROCHLORIDE 5 MG: 5 TABLET ORAL at 20:30

## 2019-03-04 RX ADMIN — OXYCODONE HYDROCHLORIDE 5 MG: 5 TABLET ORAL at 01:30

## 2019-03-04 RX ADMIN — Medication 20 MG: at 17:09

## 2019-03-04 RX ADMIN — METOPROLOL TARTRATE 100 MG: 50 TABLET, FILM COATED ORAL at 08:45

## 2019-03-04 RX ADMIN — POTASSIUM CHLORIDE 40 MEQ: 1500 TABLET, EXTENDED RELEASE ORAL at 12:55

## 2019-03-04 RX ADMIN — ATORVASTATIN CALCIUM 40 MG: 40 TABLET, FILM COATED ORAL at 17:09

## 2019-03-04 RX ADMIN — DILTIAZEM HYDROCHLORIDE 30 MG: 30 TABLET, FILM COATED ORAL at 17:10

## 2019-03-04 NOTE — PLAN OF CARE
Problem: Potential for Falls  Goal: Patient will remain free of falls  Description  INTERVENTIONS:  - Assess patient frequently for physical needs  -  Identify cognitive and physical deficits and behaviors that affect risk of falls  -  [place] fall precautions as indicated by assessment   - Educate patient/family on patient safety including physical limitations  - Instruct patient to call for assistance with activity based on assessment  - Modify environment to reduce risk of injury  - Consider OT/PT consult to assist with strengthening/mobility    Outcome: Progressing     Problem: Prexisting or High Potential for Compromised Skin Integrity  Goal: Skin integrity is maintained or improved  Description  INTERVENTIONS:  - Identify patients at risk for skin breakdown  - Assess and monitor skin integrity  - Assess and monitor nutrition and hydration status  - Monitor labs (i e  albumin)  - Assess for incontinence   - Turn and reposition patient  - Assist with mobility/ambulation  - Relieve pressure over bony prominences  - Avoid friction and shearing  - Provide appropriate hygiene as needed including keeping skin clean and dry  - Evaluate need for skin moisturizer/barrier cream  - Collaborate with interdisciplinary team (i e  Nutrition, Rehabilitation, etc )   - Patient/family teaching   Outcome: Progressing     Problem: Nutrition/Hydration-ADULT  Goal: Nutrient/Hydration intake appropriate for improving, restoring or maintaining nutritional needs  Description  Monitor and assess patient's nutrition/hydration status for malnutrition (ex- brittle hair, bruises, dry skin, pale skin and conjunctiva, muscle wasting, smooth red tongue, and disorientation)  Collaborate with interdisciplinary team and initiate plan and interventions as ordered  Monitor patient's weight and dietary intake as ordered or per policy  Utilize nutrition screening tool and intervene per policy   Determine patient's food preferences and provide high-protein, high-caloric foods as appropriate  INTERVENTIONS:  - Monitor oral intake, urinary output, labs, and treatment plans  - Assess nutrition and hydration status and recommend course of action  - Evaluate amount of meals eaten  - Assist patient with eating if necessary   - Allow adequate time for meals  - Recommend/ encourage appropriate diets, oral nutritional supplements, and vitamin/mineral supplements  - Order, calculate, and assess calorie counts as needed  - Recommend, monitor, and adjust tube feedings and TPN/PPN based on assessed needs  - Assess need for intravenous fluids  - Provide specific nutrition/hydration education as appropriate  - Include patient/family/caregiver in decisions related to nutrition   Outcome: Progressing     Problem: CARDIOVASCULAR - ADULT  Goal: Maintains optimal cardiac output and hemodynamic stability  Description  INTERVENTIONS:  - Monitor I/O, vital signs and rhythm  - Monitor for S/S and trends of decreased cardiac output i e  bleeding, hypotension  - Administer and titrate ordered vasoactive medications to optimize hemodynamic stability  - Assess quality of pulses, skin color and temperature  - Assess for signs of decreased coronary artery perfusion - ex   Angina  - Instruct patient to report change in severity of symptoms   Outcome: Progressing  Goal: Absence of cardiac dysrhythmias or at baseline rhythm  Description  INTERVENTIONS:  - Continuous cardiac monitoring, monitor vital signs, obtain 12 lead EKG if indicated  - Administer antiarrhythmic and heart rate control medications as ordered  - Monitor electrolytes and administer replacement therapy as ordered   Outcome: Progressing     Problem: METABOLIC, FLUID AND ELECTROLYTES - ADULT  Goal: Electrolytes maintained within normal limits  Description  INTERVENTIONS:  - Monitor labs and assess patient for signs and symptoms of electrolyte imbalances  - Administer electrolyte replacement as ordered  - Monitor response to electrolyte replacements, including repeat lab results as appropriate  - Instruct patient on fluid and nutrition as appropriate   Outcome: Progressing  Goal: Fluid balance maintained  Description  INTERVENTIONS:  - Monitor labs and assess for signs and symptoms of volume excess or deficit  - Monitor I/O and WT  - Instruct patient on fluid and nutrition as appropriate   Outcome: Progressing     Problem: PAIN - ADULT  Goal: Verbalizes/displays adequate comfort level or baseline comfort level  Description  Interventions:  - Encourage patient to monitor pain and request assistance  - Assess pain using appropriate pain scale  - Administer analgesics based on type and severity of pain and evaluate response  - Implement non-pharmacological measures as appropriate and evaluate response  - Consider cultural and social influences on pain and pain management  - Notify physician/advanced practitioner if interventions unsuccessful or patient reports new pain   Outcome: Progressing     Problem: INFECTION - ADULT  Goal: Absence or prevention of progression during hospitalization  Description  INTERVENTIONS:  - Assess and monitor for signs and symptoms of infection  - Monitor lab/diagnostic results  - Monitor all insertion sites, i e  indwelling lines, tubes, and drains  - Monitor endotracheal (as able) and nasal secretions for changes in amount and color  - [place] appropriate cooling/warming therapies per order  - Administer medications as ordered  - Instruct and encourage patient and family to use good hand hygiene technique  - Identify and instruct in appropriate isolation precautions for identified infection/condition    Outcome: Progressing     Problem: SAFETY ADULT  Goal: Patient will remain free of falls  Description  INTERVENTIONS:  - Assess patient frequently for physical needs  -  Identify cognitive and physical deficits and behaviors that affect risk of falls    -  [place] fall precautions as indicated by assessment   - Educate patient/family on patient safety including physical limitations  - Instruct patient to call for assistance with activity based on assessment  - Modify environment to reduce risk of injury  - Consider OT/PT consult to assist with strengthening/mobility    Outcome: Progressing  Goal: Maintain or return to baseline ADL function  Description  INTERVENTIONS:  -  Assess patient's ability to carry out ADLs; assess patient's baseline for ADL function and identify physical deficits which impact ability to perform ADLs (bathing, care of mouth/teeth, toileting, grooming, dressing, etc )  - Assess/evaluate cause of self-care deficits   - Assess range of motion  - Assess patient's mobility; develop plan if impaired  - Assess patient's need for assistive devices and provide as appropriate  - Encourage maximum independence but intervene and supervise when necessary  ¯ Involve family in performance of ADLs  ¯ Assess for home care needs following discharge   ¯ Request OT consult to assist with ADL evaluation and planning for discharge  ¯ Provide patient education as appropriate    Outcome: Progressing  Goal: Maintain or return mobility status to optimal level  Description  INTERVENTIONS:  - Assess patient's baseline mobility status (ambulation, transfers, stairs, etc )    - Identify cognitive and physical deficits and behaviors that affect mobility  - Identify mobility aids required to assist with transfers and/or ambulation (gait belt, sit-to-stand, lift, walker, cane, etc )  - [place] fall precautions as indicated by assessment  - Record patient progress and toleration of activity level on Mobility SBAR; progress patient to next Phase/Stage  - Instruct patient to call for assistance with activity based on assessment  - Request Rehabilitation consult to assist with strengthening/weightbearing, etc     Outcome: Progressing     Problem: DISCHARGE PLANNING  Goal: Discharge to home or other facility with appropriate resources  Description  INTERVENTIONS:  - Identify barriers to discharge w/patient and caregiver  - Arrange for needed discharge resources and transportation as appropriate  - Identify discharge learning needs (meds, wound care, etc )  - Arrange for interpretive services to assist at discharge as needed  - Refer to Case Management Department for coordinating discharge planning if the patient needs post-hospital services based on physician/advanced practitioner order or complex needs related to functional status, cognitive ability, or social support system   Outcome: Progressing     Problem: Knowledge Deficit  Goal: Patient/family/caregiver demonstrates understanding of disease process, treatment plan, medications, and discharge instructions  Description  Complete learning assessment and assess knowledge base    Interventions:  - Provide teaching at level of understanding  - Provide teaching via preferred learning methods   Outcome: Progressing     Problem: GENITOURINARY - ADULT  Goal: Maintains or returns to baseline urinary function  Description  INTERVENTIONS:  - Assess urinary function  - Encourage oral fluids to ensure adequate hydration  - Administer IV fluids as ordered to ensure adequate hydration  - Administer ordered medications as needed  - Offer frequent toileting  - Follow urinary retention protocol if ordered   Outcome: Progressing  Goal: Absence of urinary retention  Description  INTERVENTIONS:  - Assess patient?s ability to void and empty bladder  - Monitor I/O  - Bladder scan as needed  - Discuss with physician/AP medications to alleviate retention as needed  - Discuss catheterization for long term situations as appropriate   Outcome: Progressing     Problem: DISCHARGE PLANNING - CARE MANAGEMENT  Goal: Discharge to post-acute care or home with appropriate resources  Description  INTERVENTIONS:  - Conduct assessment to determine patient/family and health care team treatment goals, and need for post-acute services based on payer coverage, community resources, and patient preferences, and barriers to discharge  - Address psychosocial, clinical, and financial barriers to discharge as identified in assessment in conjunction with the patient/family and health care team  - Arrange appropriate level of post-acute services according to patient's   needs and preference and payer coverage in collaboration with the physician and health care team  - Communicate with and update the patient/family, physician, and health care team regarding progress on the discharge plan  - Arrange appropriate transportation to post-acute venues  - Pt to d/c with appropriate resources when medically stable     Outcome: Progressing

## 2019-03-04 NOTE — ASSESSMENT & PLAN NOTE
Present on admission, ongoing  Patient required [facility]T, currently on hemodialysis Tuesday, Thursday, Saturday  Nephrology continues to follow  Right PermCath in place  Discussed with Nephrology, discontinue IV Lasix, patient has no urinary output at this time  Likely to remain dialysis dependent with minimal chance of kidney function recovery  Appreciated eosinophiles in the urine, unlikely to be secondary to Carafate or PPI, likely to be secondary to antibiotics, most likely this is related to amiodarone use    It is expected for amiodarone to have prolonged 1/2 life the patient did have severe rash after administration of medication  He has already dialysis set up as an outpatient once stable for discharge to rehabilitation

## 2019-03-04 NOTE — PROGRESS NOTES
Progress Note - Infectious Disease   Juan Alberto Ch 60 y o  male MRN: [id number]  Unit/Bed#: [facility] 834-01 Encounter: [id number]      Impression/Plan:  1  MSSA bacteremia:  Possibility of endocarditis considered in the setting of bioprosthetic AVR  Mal Early no evidence of valvular vegetations   Initial portal of entry may have been related to multiple upper back wounds   CT chest/abdomen/pelvis with no other evidence of acute infection   Possible from pneumonia as sputum culture was positive for MSSA   Bacteremia eventually cleared   Podiatry evaluated patient's feet and no acute issues   Neurology evaluation noted with prior changes in mental status, imaging abnormalities felt to be ischemic and similar compared to prior studies   No plan for MRI   Patient improved significantly after recent GI bleed       -continue IV cefazolin at 1 g Q 24  -prolonged course of IV antibiotics of at least 6 weeks through 3/10  -monitor temperature/WBC  -send blood cultures if patient spikes fever  -can dose Ancef (2g/2g/3g) with HD once patient is on stable schedule     2  Rash and peripheral eosinophilia:  Patient recently developed rash  It was noted to be flat and pruritic  Absolute eosinophil count elevated   Likely due to amiodarone as the patient tolerated rechallenge with Ancef and there was no difference in the rash when patient was switched to vancomycin empirically  His absolute eosinophilia slowly down trending while remaining on Ancef  I suspect that this will be a prolonged elevation given the half-life of amiodarone  The patient's rash is otherwise resolved       -monitor WBC  -continue to trend fever curve/vitals     3  Left forearm myositis:  Patient was noted to have large ecchymosis his left forearm after transition from the ICU  This was thought to be due to issues with obtaining access during his acute decline    The ecchymosis has largely resolved however the patient's arm remains swollen more so than his other extremities and very painful  CT imaging of the arm was done and findings are notable for soft tissue edema and suggestion of myositis  No obvious collections seen though study limited without contrast   Vascular study with superficial thrombophlebitis in the arm  The patient fortunately remains hemodynamically stable with persistent leukocytosis   -discussed case with other services and will obtain CT of the arm with IV contrast  -continue the patient on Ancef  -will continue vancomycin and Flagyl for now  -patient may require further evaluation by surgery depending on CT findings  -continue to monitor CBC  -will check baseline CK  -continue to trend fever curve/vitals  -serial exams  -ongoing supportive care as per primary      4  History of bioprosthetic AVR   Secondary to degenerative AS   Placed in July 2014  Mal Early no evidence of endocarditis   Ongoing follow-up by Cardiology      5  Acute kidney injury   Likely ischemic/septic ATN    Patient remains on hemodialysis   He is status post PermCath placement      -ongoing follow-up by Nephrology  -antibiotics re-dose for intermittent HD      6  Leukocytosis:  WBC  elevated   Patient continues to improve clinically  This may be due now to problem 3 and problem 2 may be contributing      -continue to monitor fever curve/vitals  -continue monitor CBC  -continue antibiotics as above  -additional imaging as above     Above plan discussed in detail with patient, primary service and Nephrology  Id consult service will continue to follow    Antibiotics:  Ancef/Flagyl/vancomycin    24 hour events:  Discussed recent events with primary  Patient's left arm continues to cause significant pain and recent imaging noted  He is otherwise afebrile  White blood cell count remains elevated at 13  C diff testing was negative  Patient's other vitals are stable    Subjective:  Patient seen at bedside this morning    He currently denies having any nausea, vomiting, chest pain or shortness of breath  He is able to recall for me that he has been in the hospital he knows for more than 3-4 weeks  He can't recall some of his past medical history such as his previous heart surgery  He can't really recall though why he has been in the hospital for so long and the various infections we are treating him for  He denies having any itching over his body but does note ongoing pain in the left arm  Objective:  Vitals:  Temp:  [97 6 °F (36 4 °C)-98 9 °F (37 2 °C)] 98 9 °F (37 2 °C)  HR:  [63-79] 75  Resp:  [18-22] 18  BP: ()/(54-72) 99/57  SpO2:  [93 %-98 %] 97 %  Temp (24hrs), Av 1 °F (36 7 °C), Min:97 6 °F (36 4 °C), Max:98 9 °F (37 2 °C)  Current: Temperature: 98 9 °F (37 2 °C)    Physical Exam:   General Appearance:  Alert, interactive, nontoxic, no acute distress  Chronically ill-appearing  Throat: Oropharynx moist without lesions  Lungs:   Decreased breath sounds throughout anteriorly; no wheezes, rhonchi or rales; respirations unlabored on nasal cannula   Heart:  RRR; no murmur, rub or gallop appreciated   Abdomen:   Soft, non-tender, non-distended, positive bowel sounds  Extremities: No clubbing, cyanosis; patient has anasarca in all of his extremities  He is unable to participate in any significant movement in his lower extremities other than just rolling his legs in the bed  He can't seem to move or lift the left upper extremity  He seems to freely be able to lift the right upper extremity  Skin: No new rashes or lesions  No new draining wounds noted  The patient's left upper extremity is noted to be tender to palpation particularly in the forearm  There is no overt erythema but the skin is tense  The previously noted ecchymosis is newly resolved         Labs, Imaging, & Other studies:   All pertinent labs and imaging studies were personally reviewed  Results from last 7 days   Lab Units 19  0449 19  0004 19  1950  19  7177 03/02/19  0617   WBC Thousand/uL 13 09*  --   --   --  12 71*  --  12 68*   HEMOGLOBIN g/dL 7 2* 7 2* 7 6*   < > 7 4*   < > 7 5*   PLATELETS Thousands/uL 157  --   --   --  141*  --  152    < > = values in this interval not displayed  Results from last 7 days   Lab Units 03/04/19  0449   POTASSIUM mmol/L 3 4*   CHLORIDE mmol/L 101   CO2 mmol/L 27   BUN mg/dL 29*   CREATININE mg/dL 3 57*   EGFR ml/min/1 73sq m 18   CALCIUM mg/dL 6 9*   AST U/L 10   ALT U/L <6*   ALK PHOS U/L 78     Results from last 7 days   Lab Units 03/01/19  1441   C DIFF TOXIN B  NEGATIVE for C difficle toxin by PCR

## 2019-03-04 NOTE — ASSESSMENT & PLAN NOTE
Vascular Doppler with superficial thrombophlebitis and left IJ thrombus  Discuss with GI, okay to start anticoagulation, vascular surgery consulted, suggesting systemic anticoagulation    Will order Ace wrapping of left upper extremity  Discussed with wife at length, please refer to separate the noted dated March 1st, patient is currently on heparin drip with goal PTT between 60 and 80  His hemoglobin has remained grossly stable  No signs of overt bleeding at this time  Continue with q 6 hours a PTT  Continue with H&H q 6 hours    Although lower extremity and right upper arm edema is improving with dialysis, left upper extremity remains significantly edematous  Discussed with General surgery, unlikely to be compartment syndrome developing at this time  CT of upper extremity without contrast showing superficial and deep cellulitis with also myositis  No drainable abscess noted on noncontrast study  Discussed with infectious disease today, yesterday, I did give patient 1 dose of vancomycin and also started him on Flagyl    At this point, discontinue Ancef, discontinue Flagyl and continue with vancomycin along  Discussed with Nephrology, okay to obtain CT with IV contrast of the forearm tomorrow  If drainable abscess will consult surgery again  Discussed with orthopedic previously, if any deep collection in the can be performed by General surgery

## 2019-03-04 NOTE — ASSESSMENT & PLAN NOTE
With episode of atrial flutter while in the ICU  He was started on amiodarone which was discontinued secondary to drug rash  Continue with metoprolol and Cardizem as per Cardiology recommendation    If difficult to control heart rate he might require EP evaluation  Not a candidate for digoxin secondary to acute kidney injury  Anticoagulation with heparin drips

## 2019-03-04 NOTE — ASSESSMENT & PLAN NOTE
MSSA bacteremia was present on admission at the beginning of hospital stay, he underwent extensive workup, including transesophageal  echocardiogram without evidence of endocarditis    Originally, the plan was to continue the patient on Ancef through March 10  Unfortunate, given finding of CT scan of the forearm is now back on vancomycin  Timing of antibiotics at this point to be determined pending CT with contrast of the upper extremity    Infectious Disease continues to follow

## 2019-03-04 NOTE — ASSESSMENT & PLAN NOTE
Patient transfer from ICU on February 27  GI bleed with hypovolemic/hemorrhagic shock requiring also intubation with extubation on February 26  He underwent EGD on February 22nd positive for large gastric ulcer which was cauterized, injected with epinephrine, and also clip was applied  He underwent another endoscopy without intervention on February 23 and again on February 25 without new signs of bleeding  He underwent colonoscopy on February 25th revealing small area of ischemic colitis and he underwent also colonic decompression at that time  He will remain on b i d  PPI for 8 weeks    Status post multiple transfusion, today hemoglobin remained stable while on heparin drips  Hemoglobin remains grossly stable  Although hemoglobin today 7 2  Transfuse 1 unit of blood keep target hemoglobin around 8 given multiple comorbidities  A PTT has been within range mostly   Discussed with nursing staff, stools appear to be brown, no other sign of overt GI bleed or any other bleed this point   GI has signed off the case, to be reconsulted p r n   If any issues    He will need outpatient follow-up with EGD in 6-8 weeks, also will need repeat colonoscopy for ischemic colitis in 6-8 weeks given poor preparation

## 2019-03-04 NOTE — PROGRESS NOTES
Progress Note - Nephrology   Martha Roth 60 y o  male MRN: [id number]  Unit/Bed#: [facility] 834-01 Encounter: [id number]    ASSESSMENT AND PLAN:          Subjective:   Patient is a 74-year-old male with a history of hypertension/morbid obesity/bioprosthetic aortic valve replacement who presented with shock and AK I from [address] [place] [address]:  His course was complicated by MSSA bacteremia/AK I/GI bleeding/atrial fibrillation: We are asked to follow for PATRICK on CKD now hemodialysis dependence after initial CRRT:    1  AK I secondary to ATN in the setting of septic shock  There is some question of AIN with eosinophilia  Unfortunately we have not been able to get a urine specimen for urine free eosinophils  He also requires antibiotics for his infection along with a proton pump inhibitor because of his GI bleeding  Again initially was on CVVH and transition to IHD on 02/01/2019  He currently remains hemodialysis dependent with no signs of renal recovery with no significant urine output  -HD in a m   -challenge estimated dry weight as patient has massive anasarca and no response diuretics  -switch to oral diuretics as he is not having a significant response intravenous diuretics  -access:  Right IJ permanent Deangelo catheter  OF NOTE, WE NEED TO IDEALLY REMOVED THE CARAFATE AS THIS CAN LEAD TO ALUMINUM TOXICITY IN A PATIENT HEMODIALYSIS DEPENDENT  I WILL SPEAK WITH GI     2  Volume:  Significant anasarca  Challenge with hemodialysis/ultrafiltration    3  Hypotension:  I will place on midodrine to assist with hemodialysis/ultrafiltration  Hold parameters on all other medications  4  Electrolytes:  -hypokalemia:  Replete    5  Mineral bone disorder:  -phosphorus and magnesium and calcium are acceptable  He does have hypocalcemia but essentially corrects for low albumin    6  Anemia:  Patient's hemoglobin persistently about 7 4    This is in part related to GI bleeding in part related to AK I   -transfuse as needed for hemoglobin less than 7    7  Other issues:  -ID:  MSSA bacteremia per Infectious Disease  There is a question of myositis  on the left upper extremity  I discussed this with Dr Jane Graham as well as Infectious Disease Dr Turner Nye   The patient requires a contrast study to evaluate the left upper extremity which is absolutely necessary  The choices are either CT with contrast which of course can perpetuate AK I  The other option is MRI with gadolinium which has a high risk of causing nephrogenic systemic sclerosis which can be a very devastating skin disease and even affect internal organs  Although there is a risk for perpetuating the acute kidney disease requiring ongoing hemodialysis I believe he has been on it long enough at the moment that it most likely will not add much to the risk a perpetuate shin  In addition I strongly believe that a high risk of nephrogenic systemic sclerosis exists which can be as outlined can be very severe and devastating and several cases  Therefore, I would recommend CT with contrast verses an MRI with contrast   All parties agree  I did explain this to the patient who agrees  -GI bleeding:  Secondary to pyloric junction ulcer status post EGD x2; status post injection and clipping   -acute hypoxic respiratory failure improved  Challenge estimated dry weight   -stress cardiomyopathy in the setting of bacteremia  Resolved based on last echo   -a flutter:  Per primary service/Cardiology  I spoke to Dr Hailee Costa gastroenterology fellow who researched the Carafate and felt that it would be safe to stop Carafate in the setting of proton pump inhibitor use  It is typically used more he states for duodenal ulcers rather than peptic ulcers  He agreed with stopping it  Objective:     Vitals: Blood pressure 92/61, pulse 55, temperature 98 6 °F (37 °C), temperature source Oral, resp  rate 20, height 5' 9" (1 753 m), weight 77 1 kg (169 lb 14 4 oz), SpO2 94 %  ,Body mass index is 25 09 kg/m²  Weight (last 2 days)     Date/Time   Weight    03/04/19 0600   77 1 (169 9)    03/03/19 1322   169 (373)  (Abnormal)  3/2 Post HD weight    Weight: 3/2 Post HD weight at 03/03/19 1322    03/03/19 0600   74 8 (164 8)    03/02/19 0600   127 (280 2)                Intake/Output Summary (Last 24 hours) at 3/4/2019 1208  Last data filed at 3/4/2019 1125  Gross per 24 hour   Intake 1983 72 ml   Output 0 ml   Net 1983 72 ml       Permanent HD Catheter  (Active)   Reasons to continue HD Cath Treatment Therapy 2/26/2019  9:03 AM   Line Necessity Reviewed Yes, reviewed with provider 3/2/2019  8:00 AM   Site Assessment Clean;Dry; Intact 3/4/2019  9:00 AM   Proximal Lumen (Red Port-PICC only) Status Capped 3/4/2019  9:00 AM   Medial Lumen (Purple/White Port-PICC only) Status Capped 3/3/2019  3:46 PM   Distal Lumen (Escobar Port-PICC only) Status Capped 3/4/2019  9:00 AM   Dressing Type Chlorhexidine dressing 3/4/2019  9:00 AM   Dressing Status Clean;Dry; Intact 3/4/2019  9:00 AM   Dressing Intervention Dressing changed 3/3/2019  9:00 AM   Dressing Change Due 03/10/19 3/3/2019  9:00 AM       Physical Exam: General:  Morbid obesity and anasarca but no acute distress  Skin:  Some mild erythema of the right upper extremity  Eyes:  Noninjected and no scleral icterus  ENT:  Moist mucous membranes  Neck:  Supple, no jugular venous distention  Chest:  Clear to auscultation  CVS:  No rubs or gallops appreciable  Abdomen:   Morbidly obese, soft and nontender with normal bowel sounds  Extremities:  2+ anasarca of both upper and lower extremities; left upper extremity is wrapped  Neuro:  No gross focality  Psych:  Alert and oriented                Medications:    Scheduled Meds:  Current Facility-Administered Medications:  acetaminophen 650 mg Oral Q6H PRN Vamsi Fran, KENIANP    atorvastatin 40 mg Oral Daily With KATHY Baker    bisacodyl 10 mg Rectal Daily PRN Vamsi KATHY Peters    cefazolin 1,000 mg Intravenous Q24H KATHY De León Last Rate: Stopped (03/03/19 2330)   diltiazem 30 mg Oral Q6H [address] Jeannette Ellison MD    furosemide 80 mg Intravenous BID (diuretic) Blas Cervantes MD    heparin (porcine) 3-30 Units/kg/hr (Order-Specific) Intravenous Titrated Jeannette Ellison MD Last Rate: 10 Units/kg/hr (03/04/19 1102)   heparin (porcine) 2,500 Units Intravenous PRN Jeannette Ellison MD    heparin (porcine) 5,000 Units Intravenous PRN Jeannette Ellison MD    hydrocortisone  Topical BID KATHY De León    melatonin 6 mg Oral HS Leidy Martinez PA-C    metoprolol 5 mg Intravenous Q6H PRN Jeannette Ellison MD    metoprolol tartrate 50 mg Oral TID Teodora Dubin, MD    metroNIDAZOLE 500 mg Oral TID Jeannette Ellison MD    nystatin  Topical BID KATHY De León    omeprazole (PRILOSEC) suspension 2 mg/mL 20 mg Oral BID AC Kristal KATHY Bond    oxyCODONE 5 mg Oral Q4H PRN Jeannette Ellison MD    oxyCODONE 7 5 mg Oral Q4H PRN Jeannette Ellison MD    polyvinyl alcohol 1 drop Both Eyes Q3H PRN KATHY De León    sucralfate 1,000 mg Oral BID AC Kristal KATHY Bond        PRN Meds:   acetaminophen    bisacodyl    heparin (porcine)    heparin (porcine)    metoprolol    oxyCODONE    oxyCODONE    polyvinyl alcohol    Continuous Infusions:  heparin (porcine) 3-30 Units/kg/hr (Order-Specific) Last Rate: 10 Units/kg/hr (03/04/19 1102)       Lab, Imaging and other studies: I have personally reviewed pertinent labs    Laboratory Results:  Results from last 7 days   Lab Units 03/04/19  1053 03/04/19  0449 03/04/19  0004 03/03/19  1950 03/03/19  1544 03/03/19  1407 03/03/19  1879  03/02/19  0617  03/01/19  0618 02/28/19  0511 02/27/19  0602 02/26/19  1829  02/26/19  0632   WBC Thousand/uL  --  13 09*  --   --   --   --  12 71*  --  12 68*  --  12 78* 13 55* 15 35*  --   --  11 00*   HEMOGLOBIN g/dL 7 4* 7 2* 7 2* 7 6* 7 4* 7 3* 7 4*   < > 7 5*   < > 8 2* 8 0* 8 2* 9 0*   < > 7 6*   HEMATOCRIT % 24 4* 23 9* 23 4* 24 6* 24 4* 24 5* 24 1*   < > 25 1*   < > 26 8* 26 8* 26 6*  --   --  24 3*   PLATELETS Thousands/uL  --  157  --   --   --   --  141*  --  152  --  143* 134* 131*  --   --  81*   POTASSIUM mmol/L  --  3 4*  --   --   --   --  3 2*  --  3 1*  --  3 2* 3 2* 3 9 4 4   < >  --    CHLORIDE mmol/L  --  101  --   --   --   --  102  --  104  --  106 110* 108 108   < >  --    CO2 mmol/L  --  27  --   --   --   --  29  --  27  --  29 24 24 26   < >  --    BUN mg/dL  --  29*  --   --   --   --  21  --  22  --  14 16 8 6   < >  --    CREATININE mg/dL  --  3 57*  --   --   --   --  2 87*  --  3 02*  --  2 19* 2 18* 1 21 0 86   < >  --    CALCIUM mg/dL  --  6 9*  --   --   --   --  6 8*  --  7 0*  --  7 1* 7 7* 8 2* 8 3   < >  --    MAGNESIUM mg/dL  --  1 9  --   --   --   --  1 8  --  1 9  --  2 0 2 2 2 1 2 0   < >  --    PHOSPHORUS mg/dL  --  3 5  --   --   --   --  3 0  --  2 8  --  2 4* 2 8 2 4* 1 6*   < >  --     < > = values in this interval not displayed       Urinalysis: Lab Results   Component Value Date    COLORU Neda 01/23/2019    COLORU Yellow 10/30/2014    CLARITYU Cloudy 01/23/2019    CLARITYU Clear 10/30/2014    SPECGRAV 1 025 01/23/2019    SPECGRAV 1 009 10/30/2014    PHUR 5 5 01/23/2019    PHUR 5 0 10/30/2014    LEUKOCYTESUR Negative 01/23/2019    LEUKOCYTESUR Negative 10/30/2014    NITRITE Positive (A) 01/23/2019    NITRITE Negative 10/30/2014    PROTEINUA Negative 10/30/2014    GLUCOSEU Negative 01/23/2019    GLUCOSEU Negative 10/30/2014    KETONESU Negative 01/23/2019    KETONESU Negative 10/30/2014    BILIRUBINUR Interference- unable to analyze (A) 01/23/2019    BILIRUBINUR Negative 10/30/2014    BLOODU Large (A) 01/23/2019    BLOODU Negative 10/30/2014     ABGs:   Lab Results   Component Value Date    PH 7 346 (L) 02/21/2019     Radiology review:     Portions of the record may have been created with voice recognition software   Occasional wrong word or "sound a like" substitutions may have occurred due to the inherent limitations of voice recognition software   Read the chart carefully and recognize, using context, where substitutions have occurred

## 2019-03-04 NOTE — PROGRESS NOTES
Progress Note - Sushila Martines 1960, 60 y o  male MRN: [id number]    Unit/Bed#: [facility] 834-01 Encounter: [id number]    Primary Care Provider: Saskia Roberts DO   Date and time admitted to hospital: 1/24/2019  2:42 PM        Dysphagia  Assessment & Plan  Out of ICU he was on dysphagia 1 with nectar thick, advanced to dysphagia 2 with thin liquids at speech pathology recommendation    Deep tissue injury  Assessment & Plan  First toe on the left  Appreciated Podiatry input, improving over time, continue with clinical monitoring  No further podiatry needs while in the hospital    GI bleed  Assessment & Plan  Patient transfer from ICU on February 27  GI bleed with hypovolemic/hemorrhagic shock requiring also intubation with extubation on February 26  He underwent EGD on February 22nd positive for large gastric ulcer which was cauterized, injected with epinephrine, and also clip was applied  He underwent another endoscopy without intervention on February 23 and again on February 25 without new signs of bleeding  He underwent colonoscopy on February 25th revealing small area of ischemic colitis and he underwent also colonic decompression at that time  He will remain on b i d  PPI for 8 weeks    Status post multiple transfusion, today hemoglobin remained stable while on heparin drips  Hemoglobin remains grossly stable  Although hemoglobin today 7 2  Transfuse 1 unit of blood keep target hemoglobin around 8 given multiple comorbidities  A PTT has been within range mostly   Discussed with nursing staff, stools appear to be brown, no other sign of overt GI bleed or any other bleed this point   GI has signed off the case, to be reconsulted p r n   If any issues    He will need outpatient follow-up with EGD in 6-8 weeks, also will need repeat colonoscopy for ischemic colitis in 6-8 weeks given poor preparation    Hypovolemic shock ([address] )  Assessment & Plan  Resolved    Acute on chronic renal failure [facility])  Assessment & Plan  Present on admission, ongoing  Patient required CCRT, currently on hemodialysis Tuesday, Thursday, Saturday  Nephrology continues to follow  Right PermCath in place  Discussed with Nephrology, discontinue IV Lasix, patient has no urinary output at this time  Likely to remain dialysis dependent with minimal chance of kidney function recovery  Appreciated eosinophiles in the urine, unlikely to be secondary to Carafate or PPI, likely to be secondary to antibiotics, most likely this is related to amiodarone use    It is expected for amiodarone to have prolonged 1/2 life the patient did have severe rash after administration of medication  He has already dialysis set up as an outpatient once stable for discharge to rehabilitation    Skin rash  Assessment & Plan  Likely related to amiodarone, resolved    Toxic metabolic encephalopathy  Assessment & Plan  Appears resolved, patient is cooperative, although patient appears to have a very superficial understanding of his medical conditions at this time, discussed with wife, underlying learning disability    Cerebrovascular accident [facility])  Assessment & Plan  Patient underwent CT of the head at the end of January and then again beginning of February 2019, he was found to have abnormalities of put parietal occipital lobes, with suggestion of subacute strokes on latest CT scan  As per review of records, he was evaluated by neurologist and originally an MRI of the brain was requested although, at later time, he was decided to for sake MRI as an inpatient and if patient following up as outpatient with Stroke Clinic  Remains on atorvastatin  No aspirin as per now given recent GI bleed in currently anticoagulated with heparin drips  Monitor clinically      Acute blood loss anemia  Assessment & Plan  Secondary to upper GI bleed, please refer to principal plan    Thrombocytopenia [facility])  Assessment & Plan  Resolved  Latest Measurement 157    MSSA bacteremia  Assessment & Plan  MSSA bacteremia was present on admission at the beginning of hospital stay, he underwent extensive workup, including transesophageal  echocardiogram without evidence of endocarditis    Originally, the plan was to continue the patient on Ancef through March 10  Unfortunate, given finding of CT scan of the forearm is now back on vancomycin  Timing of antibiotics at this point to be determined pending CT with contrast of the upper extremity  Infectious Disease continues to follow    Persistent atrial fibrillation ([address] )  Assessment & Plan  With episode of atrial flutter while in the ICU  He was started on amiodarone which was discontinued secondary to drug rash  Continue with metoprolol and Cardizem as per Cardiology recommendation  If difficult to control heart rate he might require EP evaluation  Not a candidate for digoxin secondary to acute kidney injury  Anticoagulation with heparin drips      Acute respiratory failure with hypoxia [facility])  Assessment & Plan  Patient has been on and off oxygen for the past 24 hours, he will receive dialysis tomorrow with fluid challenge as per Nephrology, for this reason he will be started on midodrine to allow blood pressure to sustain more ultrafiltration during dialysis treatment tomorrow  Will continue to try to wean off oxygen and back to room air as able    Stress-induced cardiomyopathy  Assessment & Plan  Volume controlled with hemodialysis, remains a beta-blocker, cardiology continues to follow    * Left arm swelling  Assessment & Plan  Vascular Doppler with superficial thrombophlebitis and left IJ thrombus  Discuss with GI, okay to start anticoagulation, vascular surgery consulted, suggesting systemic anticoagulation    Will order Ace wrapping of left upper extremity  Discussed with wife at length, please refer to separate the noted dated March 1st, patient is currently on heparin drip with goal PTT between 60 and 80  His hemoglobin has remained grossly stable  No signs of overt bleeding at this time  Continue with q 6 hours a PTT  Continue with H&H q 6 hours    Although lower extremity and right upper arm edema is improving with dialysis, left upper extremity remains significantly edematous  Discussed with General surgery, unlikely to be compartment syndrome developing at this time  CT of upper extremity without contrast showing superficial and deep cellulitis with also myositis  No drainable abscess noted on noncontrast study  Discussed with infectious disease today, yesterday, I did give patient 1 dose of vancomycin and also started him on Flagyl  At this point, discontinue Ancef, discontinue Flagyl and continue with vancomycin along  Discussed with Nephrology, okay to obtain CT with IV contrast of the forearm tomorrow  If drainable abscess will consult surgery again  Discussed with orthopedic previously, if any deep collection in the can be performed by General surgery    VTE Pharmacologic Prophylaxis: yes  Pharmacologic: Heparin Drip  Mechanical VTE Prophylaxis in Place: Yes    Patient Centered Rounds: I have performed bedside rounds with nursing staff today  Discussions with Specialists or Other Care Team Provider:  Nephrology, Infectious Disease    Education and Discussions with Family / Patient:  Patient, wife was updated last night  Today I tried to call her although the phone is mute? Time Spent for Care: 45 minutes  More than 50% of total time spent on counseling and coordination of care as described above  Current Length of Stay: 39 day(s)    Current Patient Status: Inpatient   Certification Statement: The patient will continue to require additional inpatient hospital stay due to Please refer to above    Discharge Plan: To be determined, ultimately rehabilitation    Code Status: Level 1 - Full Code      Subjective:   Continues to complain of left upper extremity pain well controlled with oxycodone    Otherwise he has no other concerns  Objective:     Vitals:   Temp (24hrs), Av 5 °F (36 9 °C), Min:97 6 °F (36 4 °C), Max:99 1 °F (37 3 °C)    Temp:  [97 6 °F (36 4 °C)-99 1 °F (37 3 °C)] 98 8 °F (37 1 °C)  HR:  [55-79] 71  Resp:  [18-22] 20  BP: ()/(50-72) 96/50  SpO2:  [93 %-97 %] 95 %  Body mass index is 52 97 kg/m²  Input and Output Summary (last 24 hours): Intake/Output Summary (Last 24 hours) at 3/4/2019 1642  Last data filed at 3/4/2019 1317  Gross per 24 hour   Intake  72 ml   Output 0 ml   Net  72 ml       Physical Exam:     Physical Exam   Constitutional: He is oriented to person, place, and time  He appears well-developed  No distress  Cardiovascular: Normal rate, regular rhythm and normal heart sounds  Exam reveals no friction rub  No murmur heard  Pulmonary/Chest: Effort normal and breath sounds normal  No respiratory distress  He has no wheezes  He has no rales  Difficult auscultation, limited mobility, anterior auscultation   Abdominal: Soft  Bowel sounds are normal  He exhibits no distension  There is no tenderness  There is no guarding  Musculoskeletal: He exhibits edema (Plus three edema right arm, bilateral lower extremities, +4 left upper extremity)  Neurological: He is alert and oriented to person, place, and time  Skin: Skin is warm  Psychiatric: He has a normal mood and affect   Judgment and thought content normal        Additional Data:     Labs:    Results from last 7 days   Lab Units 19  1053 19  0449   WBC Thousand/uL  --  13 09*   HEMOGLOBIN g/dL 7 4* 7 2*   HEMATOCRIT % 24 4* 23 9*   PLATELETS Thousands/uL  --  157   NEUTROS PCT %  --  67   LYMPHS PCT %  --  6*   MONOS PCT %  --  7   EOS PCT %  --  15*     Results from last 7 days   Lab Units 19  0449   POTASSIUM mmol/L 3 4*   CHLORIDE mmol/L 101   CO2 mmol/L 27   BUN mg/dL 29*   CREATININE mg/dL 3 57*   CALCIUM mg/dL 6 9*   ALK PHOS U/L 78   ALT U/L <6*   AST U/L 10     Results from last 7 days   Lab Units 03/01/19  1715   INR  1 28*       * I Have Reviewed All Lab Data Listed Above  * Additional Pertinent Lab Tests Reviewed: All Labs Within Last 24 Hours Reviewed    Imaging:    Imaging Reports Reviewed Today Include:  None  Imaging Personally Reviewed by Myself Includes:  None    Recent Cultures (last 7 days):     Results from last 7 days   Lab Units 03/01/19  1441   C DIFF TOXIN B  NEGATIVE for C difficle toxin by PCR          Last 24 Hours Medication List:     Current Facility-Administered Medications:  acetaminophen 650 mg Oral Q6H PRN KATHY Bang    atorvastatin 40 mg Oral Daily With Delbert Fox, [address]    b complex-vitamin C-folic acid 1 capsule Oral Daily With Brandon Sommers MD    bisacodyl 10 mg Rectal Daily PRN KATHY Bang    cefazolin 1,000 mg Intravenous Q24H KATHY Bang Last Rate: Stopped (03/03/19 2330)   diltiazem 30 mg Oral Q6H [facility] Tonny Nelson MD    heparin (porcine) 3-30 Units/kg/hr (Order-Specific) Intravenous Titrated Gisela Garcia MD Last Rate: 10 Units/kg/hr (03/04/19 1102)   heparin (porcine) 2,500 Units Intravenous PRN Gisela Garcia MD    heparin (porcine) 5,000 Units Intravenous PRN Gisela Garcia MD    hydrocortisone  Topical BID KATHY Bang    melatonin 6 mg Oral HS Leidy Martinez PA-C    metoprolol 5 mg Intravenous Q6H PRN Gisela Garcia MD    metoprolol tartrate 50 mg Oral TID Tonny Nelson MD    metroNIDAZOLE 500 mg Oral TID Gisela Garcia MD    midodrine 10 mg Oral Before Dialysis Tonny Nelson MD    nystatin  Topical BID KATHY Bang    omeprazole (PRILOSEC) suspension 2 mg/mL 20 mg Oral BID AC KATHY Lugo    oxyCODONE 5 mg Oral Q4H PRN Gisela Garcia MD    oxyCODONE 7 5 mg Oral Q4H PRN Gisela Garcia MD    polyvinyl alcohol 1 drop Both Eyes Q3H PRN KATHY Bang    [START ON 3/5/2019] vancomycin 10 mg/kg (Adjusted) Intravenous Once per day on Tue Thu Sat Carole Bush MD         Today, Patient Was Seen By: Megha Pham MD    ** Please Note: Dragon 360 Dictation voice to text software may have been used in the creation of this document   **

## 2019-03-04 NOTE — PROGRESS NOTES
Vancomycin Assessment    Kofi Heath is a 60 y o  male who is currently receiving vancomycin 750mg three times a week after hemodialysis on Tues Thurs  , Sat, 2000mg bolus given 3/3/19 @ 23:50 for other myositis   Relevant clinical data and objective history reviewed:  Creatinine   Date Value Ref Range Status   03/04/2019 3 57 (H) 0 60 - 1 30 mg/dL Final     Comment:     Standardized to IDMS reference method   03/03/2019 2 87 (H) 0 60 - 1 30 mg/dL Final     Comment:     Standardized to IDMS reference method   03/02/2019 3 02 (H) 0 60 - 1 30 mg/dL Final     Comment:     Standardized to IDMS reference method   04/09/2015 0 67 0 60 - 1 30 mg/dL Final     Comment:     Standardized to IDMS reference method   11/15/2014 0 70 0 60 - 1 30 mg/dL Final     Comment:     Standardized to IDMS reference method   11/02/2014 0 86 0 60 - 1 30 mg/dL Final     Comment:     Standardized to IDMS reference method     BP 96/50   Pulse 71   Temp 98 8 °F (37 1 °C) (Axillary)   Resp 20   Ht 5' 9" (1 753 m)   Wt (!) 163 kg (358 lb 11 oz)   SpO2 95%   BMI 52 97 kg/m²   I/O last 3 completed shifts: In: 1913 7 [P O :470; I V :401 2; IV Piggyback:1042 5]  Out: 0   Lab Results   Component Value Date/Time    BUN 29 (H) 03/04/2019 04:49 AM    BUN 17 04/09/2015 11:41 AM    WBC 13 09 (H) 03/04/2019 04:49 AM    WBC 6 71 11/02/2014 05:56 AM    HGB 7 4 (L) 03/04/2019 10:53 AM    HGB 11 9 (L) 11/02/2014 05:56 AM    HCT 24 4 (L) 03/04/2019 10:53 AM    HCT 39 3 11/02/2014 05:56 AM    MCV 98 03/04/2019 04:49 AM    MCV 81 (L) 11/02/2014 05:56 AM     03/04/2019 04:49 AM     11/02/2014 05:56 AM     Temp Readings from Last 3 Encounters:   03/04/19 98 8 °F (37 1 °C) (Axillary)   01/24/19 99 8 °F (37 7 °C) (Tympanic)   06/23/15 97 8 °F (36 6 °C)     Vancomycin Days of Therapy: 2    Assessment/Plan  The patient is currently on vancomycin utilizing scheduled dosing based on adjusted body weight (due to obesity)    Baseline risks associated with therapy include: pre-existing renal impairment and concomitant nephrotoxic medications  The patient is currently receiving 750mg three times a week after hemodialysis on Tues Thurs  , Sat, 2000mg bolus given 3/3/19 @ 23:50 and is clinically appropriate and dose will be continued  Pharmacy will also follow closely for s/sx of nephrotoxicity, infusion reactions and appropriateness of therapy  BMP and CBC will be ordered per protocol  Plan for trough as patient approaches steady state, prior to the 3rd HD session dose at approximately 06:00 on 3/9/19  Due to infection severity, will target a trough of 15-20 (appropriate for most indications)   Pharmacy will continue to follow the patient?s culture results and clinical progress daily      Trey Luciano, Pharmacist

## 2019-03-04 NOTE — ASSESSMENT & PLAN NOTE
Patient has been on and off oxygen for the past 24 hours, he will receive dialysis tomorrow with fluid challenge as per Nephrology, for this reason he will be started on midodrine to allow blood pressure to sustain more ultrafiltration during dialysis treatment tomorrow  Will continue to try to wean off oxygen and back to room air as able

## 2019-03-04 NOTE — PROGRESS NOTES
General Cardiology   Progress Note -  Team One   Kofi Heath 60 y o  male MRN: [id number]    Unit/Bed#: [facility] 834-01 Encounter: [id number]    Assessment/ Plan:    Paroxysmal atrial fibrillation/flutter:  Patient remains in rate controlled flutter; currently in the 70s  Tolerating metoprolol tartrate 100 mg b i d  As well as low-dose Cardizem 30 mg every 6 hours  He did have slow ventricular response yesterday morning but no further bradycardia  - blood pressure is somewhat stable and he is receiving most of his doses  Patient was anticoagulated with warfarin prior to admission had significant GI bleeding, per per conversation with GI and patient's outpatient cardiologist Dr Guillaume Dominguez the plan was to not resume warfarin; however patient has been placed on intravenous heparin for IJ thrombus  IJ thrombus:  Significant left upper extremity swelling; vascular evaluated the patient  On intravenous heparin; hemoglobin remaining stable    Stress cardiomyopathy:  With recovered EF  Prior EF 30-35% in setting of sepsis/bacteremia  Repeat limited echo 2/19/1 9 showed recovery with EF of 55%    Acute kidney injury:  Requiring CVVH and now intermittent HD, Tuesday Thursday Saturday as needed  He is volume overloaded on exam; Lasix 80 mg IV b i d  Her Nephrology  - remains positive 6 5 L overall    Chronic LBBB:  Normal catheterization 2014    History of aortic stenosis status post bioprosthetic AVR 2014; no vegetation on CHON 1/25/19    Hypertension:  Blood pressure labile times on p  O  Metoprolol and diltiazem more for rate control    MSSA bacteremia:  On intravenous cefazolin; blood cultures 1/2 7/1 9 without growth  Subjective:  Patient seen for follow-up  Events the past 24 hours noted  Patient is now on intravenous heparin for a IJ thrombus  He reports feeling fine today, has some lower extremity pain  Not experiencing any shortness of breath, palpitations dizziness or lightheadedness      Review of Systems   Constitution: Positive for malaise/fatigue  Negative for decreased appetite and fever  Cardiovascular: Positive for leg swelling  Negative for chest pain, irregular heartbeat, orthopnea and palpitations  Respiratory: Negative for cough and shortness of breath  Gastrointestinal: Negative for nausea and vomiting  Genitourinary: Negative for dysuria  Neurological: Negative for light-headedness  Psychiatric/Behavioral: Negative for altered mental status  All other systems reviewed and are negative  Objective:   Vitals: Blood pressure 99/57, pulse 75, temperature 98 9 °F (37 2 °C), temperature source Oral, resp  rate 18, height 5' 9" (1 753 m), weight 77 1 kg (169 lb 14 4 oz), SpO2 97 %  ,       Body mass index is 25 09 kg/m²  ,     Systolic (37PUF), TSL:258 , Min:90 , OXX:089     Diastolic (14OTH), JKQ:11, Min:54, Max:72          Intake/Output Summary (Last 24 hours) at 3/4/2019 0954  Last data filed at 3/4/2019 0856  Gross per 24 hour   Intake 1983 72 ml   Output 0 ml   Net 1983 72 ml     Weight (last 2 days)     Date/Time   Weight    03/04/19 0600   77 1 (169 9)    03/03/19 1322   169 (373)  (Abnormal)  3/2 Post HD weight    Weight: 3/2 Post HD weight at 03/03/19 1322    03/03/19 0600   74 8 (164 8)    03/02/19 0600   127 (280 2)            Telemetry Review:   Current atrial flutter with rate in the 70s  Noted atrial flutter with slow ventricular response yesterday at approximately 10-11 a m  Heart rates as low as 36; no significant pauses    Physical Exam   Constitutional: No distress  Patient lying in bed in NAD, chronically ill-appearing   HENT:   Head: Normocephalic and atraumatic  Neck: JVD: Unable to assess given body habitus  Cardiovascular: Normal rate, regular rhythm, S1 normal and S2 normal    No murmur heard  Diffuse edema   Pulmonary/Chest: Effort normal  He has no wheezes  He has no rales     Breath sounds clear to auscultation anteriorly, poor patient effort and distant sounds  On O2 via nasal cannula, no respiratory distress   Abdominal: Soft  Obese   Musculoskeletal: He exhibits edema  Sling to left upper extremity   Neurological: He is alert  Skin: Skin is warm and dry  He is not diaphoretic  Nursing note and vitals reviewed  LABORATORY RESULTS      CBC with diff: Results from last 7 days   Lab Units 03/04/19  0449 03/04/19  0004 03/03/19  1950 03/03/19  1544 03/03/19  1407 03/03/19  0640 03/03/19  0129  03/02/19  0617  03/01/19  7357 02/28/19  0511 02/27/19  0602  02/26/19  0632   WBC Thousand/uL 13 09*  --   --   --   --  12 71*  --   --  12 68*  --  12 78* 13 55* 15 35*  --  11 00*   HEMOGLOBIN g/dL 7 2* 7 2* 7 6* 7 4* 7 3* 7 4* 7 5*   < > 7 5*   < > 8 2* 8 0* 8 2*   < > 7 6*   HEMATOCRIT % 23 9* 23 4* 24 6* 24 4* 24 5* 24 1* 24 5*   < > 25 1*   < > 26 8* 26 8* 26 6*  --  24 3*   MCV fL 98  --   --   --   --  98  --   --  98  --  99* 100* 97  --  95   PLATELETS Thousands/uL 157  --   --   --   --  141*  --   --  152  --  143* 134* 131*  --  81*   MCH pg 29 6  --   --   --   --  30 2  --   --  29 4  --  30 1 29 7 29 9  --  29 7   MCHC g/dL 30 1*  --   --   --   --  30 7*  --   --  29 9*  --  30 6* 29 9* 30 8*  --  31 3*   RDW % 15 9*  --   --   --   --  15 9*  --   --  16 3*  --  16 2* 16 3* 16 6*  --  16 5*   MPV fL 11 4  --   --   --   --  11 2  --   --  11 1  --  11 7 11 0 11 7  --  11 3   NRBC AUTO /100 WBCs 0  --   --   --   --  0  --   --  0  --  0  --  0  --  0   NRBC /100 WBC  --   --   --   --   --   --   --   --   --   --   --   --   --   --  1    < > = values in this interval not displayed       CMP:  Results from last 7 days   Lab Units 03/04/19  0449 03/03/19  3597 03/02/19  0617 03/01/19  0618 02/28/19  0511 02/27/19  0602 02/26/19  1829   POTASSIUM mmol/L 3 4* 3 2* 3 1* 3 2* 3 2* 3 9 4 4   CHLORIDE mmol/L 101 102 104 106 110* 108 108   CO2 mmol/L 27 29 27 29 24 24 26   BUN mg/dL 29* 21 22 14 16 8 6   CREATININE mg/dL 3 57* 2 87* 3 02* 2 19* 2 18* 1 21 0  86   CALCIUM mg/dL 6 9* 6 8* 7 0* 7 1* 7 7* 8 2* 8 3   AST U/L 10  --   --   --   --   --   --    ALT U/L <6*  --   --   --   --   --   --    ALK PHOS U/L 78  --   --   --   --   --   --    EGFR ml/min/1 73sq m 18 23 22 32 32 65 95     BMP:  Results from last 7 days   Lab Units 19  0624 19  0617 19  0618 19  0511 19  0602 19  1829   POTASSIUM mmol/L 3 4* 3 2* 3 1* 3 2* 3 2* 3 9 4 4   CHLORIDE mmol/L 101 102 104 106 110* 108 108   CO2 mmol/L 27 29 27 29 24 24 26   BUN mg/dL 29* 21 22 14 16 8 6   CREATININE mg/dL 3 57* 2 87* 3 02* 2 19* 2 18* 1 21 0 86   CALCIUM mg/dL 6 9* 6 8* 7 0* 7 1* 7 7* 8 2* 8 3     Lab Results   Component Value Date    NTBNP 25,015 (H) 2019    NTBNP 25,548 (H) 2019     Results from last 7 days   Lab Units 19  0624 19  0617 19  0618 19  0511 19  0602 19  1829   MAGNESIUM mg/dL 1 9 1 8 1 9 2 0 2 2 2 1 2 0     Results from last 7 days   Lab Units 19  1715   INR  1 28*     Lipid Profile:   Lab Results   Component Value Date    CHOL 146 2014    CHOL 145 2014     Lab Results   Component Value Date    HDL 44 2019    HDL 41 2018    HDL 52 2017     Lab Results   Component Value Date    LDLCALC 77 2019    LDLCALC 57 2018    LDLCALC 129 (H) 2017     Lab Results   Component Value Date    TRIG 129 2019    TRIG 102 2018    TRIG 71 2017     Cardiac testing:   Results for orders placed during the hospital encounter of 19   Echo complete with contrast if indicated    Narrative [address] [address] [phone]    Transthoracic Echocardiogram  2D, Doppler, and Color Doppler    Study date:  2019    Patient: Alan Rogers  MR number: [id number]  Account number: [de-identified]  : 1960  Age: 60 years  Gender: Male  Status: Inpatient  Location: Bedside  Height: 72 in  Weight: 339 lb  BP: 89/ 54 mmHg    Indications: chest pain    Diagnoses: R07 9 - Chest pain, unspecified    Sonographer:  Cristy Feldman RD, CCT  Primary Physician:  Jose Duffy DO  Referring Physician:  Zoie Seth DO  Group:  [id number]  Interpreting Physician:  Alissa Ramos DO    SUMMARY    PROCEDURE INFORMATION:  This was a technically difficult study despite the administration of echo contrast     LEFT VENTRICLE:  Systolic function was markedly reduced  Ejection fraction was estimated to be 30 %  There was severe diffuse hypokinesis with no obvious identifiable regional variations  Wall thickness was moderately increased  Concentric hypertrophy was present  VENTRICULAR SEPTUM:  There was dyssynergic motion  RIGHT VENTRICLE:  The ventricle was mildly dilated  Systolic function was moderately to markedly reduced  LEFT ATRIUM:  The atrium was mildly dilated  RIGHT ATRIUM:  The atrium was mildly dilated  AORTIC VALVE:  A bioprosthesis was present  It was not well visualized  Mean transvalvular gradient 13 mmHg  TRICUSPID VALVE:  There was mild regurgitation  PERICARDIUM:  A small, partially loculated pericardial effusion was identified along the left ventricular free wall  HISTORY: PRIOR HISTORY: Aortic valve prosthesis    PROCEDURE: The procedure was performed at the bedside  This was a routine study  The transthoracic approach was used  The study included complete 2D imaging, complete spectral Doppler, and color Doppler  The heart rate was 80 bpm, at the  start of the study  Intravenous contrast (Definity solution [1 3 ml Definity/8 7ml normal saline solution]) was administered  Intravenous contrast (Definity solution [1 3 ml Definity/8 7ml normal saline solution]) was administered to  opacify the left ventricle and enhance Doppler signals  Echocardiographic views were limited due to low windows and patient on mechanical ventilator   This was a technically difficult study despite the administration of echo contrast     LEFT VENTRICLE: Size was normal  Systolic function was markedly reduced  Ejection fraction was estimated to be 30 %  There was severe diffuse hypokinesis with no obvious identifiable regional variations  Wall thickness was moderately  increased  Concentric hypertrophy was present  DOPPLER: Normal sinus rhythm was absent  The study was not technically sufficient to allow evaluation of LV diastolic function  VENTRICULAR SEPTUM: There was dyssynergic motion  RIGHT VENTRICLE: The ventricle was mildly dilated  Systolic function was moderately to markedly reduced  LEFT ATRIUM: The atrium was mildly dilated  RIGHT ATRIUM: The atrium was mildly dilated  MITRAL VALVE: Not well visualized  DOPPLER: The transmitral velocity was within the normal range  There was no evidence for stenosis  There was no significant regurgitation  AORTIC VALVE: A bioprosthesis was present  It was not well visualized  Mean transvalvular gradient 13 mmHg  DOPPLER: There was no significant regurgitation  TRICUSPID VALVE: The valve structure was normal  There was normal leaflet separation  DOPPLER: The transtricuspid velocity was within the normal range  There was no evidence for stenosis  There was mild regurgitation  The tricuspid jet  envelope definition was inadequate for estimation of RV systolic pressure  PULMONIC VALVE: Leaflets exhibited normal thickness, no calcification, and normal cuspal separation  DOPPLER: The transpulmonic velocity was within the normal range  There was no significant regurgitation  PERICARDIUM: A small, partially loculated pericardial effusion was identified along the left ventricular free wall  The pericardium was normal in appearance  AORTA: The root exhibited normal size      SYSTEM MEASUREMENT TABLES    2D  %FS: 15 83 %  Ao Diam: 3 09 cm  EDV(Teich): 221 88 ml  EF(Teich): 32 54 %  ESV(Teich): 149 69 ml  IVSd: 1 59 cm  LA Diam: 5 18 cm  LVIDd: 6 58 cm  LVIDs: 5 54 cm  LVPWd: 1 43 cm  SV(Teich): 72 19 ml    CW  AV Env  Ti: 233 56 ms  AV VTI: 49 77 cm  AV Vmax: 2 64 m/s  AV Vmax: 2 67 m/s  AV Vmean: 2 13 m/s  AV maxP 95 mmHg  AV maxP 53 mmHg  AV meanP 73 mmHg    PW  LVOT Env  Ti: 215 22 ms  LVOT VTI: 11 41 cm  LVOT Vmax: 0 59 m/s  LVOT Vmax: 0 7 m/s  LVOT Vmean: 0 52 m/s  LVOT maxP 78 mmHg  LVOT maxP mmHg  LVOT meanP 24 mmHg  MV E Deep: 1 01 m/s    [facility] Accredited Echocardiography Laboratory    Prepared and electronically signed by    Luis Carlos Ly DO  Signed 2019 10:14:55       Results for orders placed during the hospital encounter of 19   CHON    Narrative [facility] [address]  [facility], [address] [facility] [address]  [phone]    Transesophageal Echocardiogram  2D, Doppler, and Color Doppler    Study date:  2019    Patient: Dixon Shi  MR number: [id number]  Account number: [de-identified]  : 1960  Age: 60 years  Gender: Male  Status: Inpatient  Location: Bedside  Height: 69 in  Weight: 319 lb  BP: 101/ 54 mmHg    Indications: Bacteremia  Diagnoses: R78 81 - Bacteremia    Sonographer:  Brook Azar RDCS  Interpreting Physician:  Haylee Lewis DO  Primary Physician:  Kael Garcia DO  Referring Physician:  Guzman Denney DO  Group:  [id number]  Cardiology Fellow:  Kesha Werner MD    IMPRESSIONS:  There was no echocardiographic evidence for valvular vegetation  SUMMARY    LEFT VENTRICLE:  Systolic function was moderately reduced  Ejection fraction was estimated to be 40 %  There was moderate diffuse hypokinesis  Wall thickness was mildly increased  There was mild concentric hypertrophy  RIGHT VENTRICLE:  The size was normal   Systolic function was mildly reduced  LEFT ATRIUM:  The atrium was mildly dilated      ATRIAL SEPTUM:  There was a small patent foramen ovale with left to right shunt identified by color Doppler  RIGHT ATRIUM:  The atrium was mildly dilated  MITRAL VALVE:  There was trace regurgitation  There was no echocardiographic evidence of vegetation  AORTIC VALVE:  A bioprosthesis was present  It exhibited normal function  There was no echocardiographic evidence of vegetation  TRICUSPID VALVE:  There was mild regurgitation  There was no echocardiographic evidence of vegetation  HISTORY: PRIOR HISTORY: Aortic valve replacement, NSTEMI, Cardiomyopathy, Acute kidney injury  PROCEDURE: The procedure was performed at the bedside  This was a routine study  The risks and alternatives of the procedure were explained to the patient's next of kin and informed consent was obtained  The transesophageal approach was  used  The study included complete 2D imaging, complete spectral Doppler, and color Doppler  The heart rate was 113 bpm, at the start of the study  An adult omniplane probe was inserted by the cardiology fellow under direct supervision of  the attending cardiologist  Intubated with ease  One intubation attempt(s)  There was no blood detected on the probe  Image quality was adequate  There were no complications during the procedure  MEDICATIONS: Sedation administered by  bedside nurse  LEFT VENTRICLE: Size was normal  Systolic function was moderately reduced  Ejection fraction was estimated to be 40 %  There was moderate diffuse hypokinesis  Wall thickness was mildly increased  There was mild concentric hypertrophy  DOPPLER: The study was not technically sufficient to allow evaluation of LV diastolic function  RIGHT VENTRICLE: The size was normal  Systolic function was mildly reduced  Wall thickness was normal     LEFT ATRIUM: The atrium was mildly dilated  No thrombus was identified  APPENDAGE: The appendage was small  No thrombus was identified  DOPPLER: The function was normal (normal emptying velocity)      ATRIAL SEPTUM: There was a small patent foramen ovale with left to right shunt identified by color Doppler  RIGHT ATRIUM: The atrium was mildly dilated  No thrombus was identified  MITRAL VALVE: Valve structure was normal  There was normal leaflet separation  There was no echocardiographic evidence of vegetation  DOPPLER: There was trace regurgitation  AORTIC VALVE: A bioprosthesis was present  It exhibited normal function  There was no echocardiographic evidence of vegetation  TRICUSPID VALVE: The valve structure was normal  There was normal leaflet separation  There was no echocardiographic evidence of vegetation  DOPPLER: There was mild regurgitation  PULMONIC VALVE: Leaflets exhibited normal thickness, no calcification, and normal cuspal separation  There was no echocardiographic evidence of vegetation  DOPPLER: There was no significant regurgitation  PERICARDIUM: There was no pericardial effusion seen in the images obtained  AORTA: The root exhibited normal size  There was no atheroma  There was no evidence for dissection  There was no evidence for aneurysm      [address] Accredited Echocardiography Laboratory    Prepared and electronically signed by    Eulogio Khoury DO  Signed 25-Jan-2019 14:01:14       Meds/Allergies   current meds:   Current Facility-Administered Medications   Medication Dose Route Frequency    acetaminophen (TYLENOL) tablet 650 mg  650 mg Oral Q6H PRN    atorvastatin (LIPITOR) tablet 40 mg  40 mg Oral Daily With Dinner    bisacodyl (DULCOLAX) rectal suppository 10 mg  10 mg Rectal Daily PRN    ceFAZolin (ANCEF) 1 g in sodium chloride 0 9% 50 ml IVPB  1,000 mg Intravenous Q24H    diltiazem (CARDIZEM) tablet 30 mg  30 mg Oral Q6H [address]    furosemide (LASIX) injection 80 mg  80 mg Intravenous BID (diuretic)    heparin (porcine) 25,000 units in 250 mL infusion (premix)  3-30 Units/kg/hr (Order-Specific) Intravenous Titrated    heparin (porcine) injection 2,500 Units  2,500 Units Intravenous PRN    heparin (porcine) injection 5,000 Units  5,000 Units Intravenous PRN    hydrocortisone 1 % cream   Topical BID    melatonin tablet 6 mg  6 mg Oral HS    metoprolol (LOPRESSOR) injection 5 mg  5 mg Intravenous Q6H PRN    metoprolol tartrate (LOPRESSOR) tablet 100 mg  100 mg Oral Q12H ARACELIS    metroNIDAZOLE (FLAGYL) tablet 500 mg  500 mg Oral TID    nystatin (MYCOSTATIN) powder   Topical BID    omeprazole (PRILOSEC) suspension 2 mg/mL  20 mg Oral BID AC    oxyCODONE (ROXICODONE) IR tablet 5 mg  5 mg Oral Q4H PRN    oxyCODONE (ROXICODONE) IR tablet 7 5 mg  7 5 mg Oral Q4H PRN    polyvinyl alcohol (LIQUIFILM TEARS) 1 4 % ophthalmic solution 1 drop  1 drop Both Eyes Q3H PRN    sucralfate (CARAFATE) oral suspension 1,000 mg  1,000 mg Oral BID AC     Medications Prior to Admission   Medication    amLODIPine (NORVASC) 5 mg tablet    ascorbic acid (VITAMIN C) 500 MG tablet    aspirin (ECOTRIN) 325 mg EC tablet    fluticasone (FLONASE) 50 mcg/act nasal spray    loratadine (CLARITIN) 10 mg tablet    metoprolol tartrate (LOPRESSOR) 100 mg tablet    potassium chloride (K-DUR,KLOR-CON) 20 mEq tablet    pravastatin (PRAVACHOL) 40 mg tablet    torsemide (DEMADEX) 20 mg tablet       heparin (porcine) 3-30 Units/kg/hr (Order-Specific) Last Rate: 10 Units/kg/hr (03/04/19 0703)     Assessment:  Principal Problem:    Left arm swelling  Active Problems:    Stress-induced cardiomyopathy    Acute respiratory failure with hypoxia (HCC)    Persistent atrial fibrillation (HCC)    MSSA bacteremia    Thrombocytopenia (HCC)    Acute blood loss anemia    Cerebrovascular accident ([facility] [address] )    Toxic metabolic encephalopathy    Skin rash    Acute on chronic renal failure (HCC)    Hypovolemic shock (HCC)    GI bleed    Deep tissue injury    Dysphagia      Counseling / Coordination of Care  Total floor / unit time spent today 20 minutes    Greater than 50% of total time was spent with the patient and / or family counseling and / or coordination of care  ** Please Note: Dragon 360 Dictation voice to text software may have been used in the creation of this document   **

## 2019-03-05 ENCOUNTER — APPOINTMENT (INPATIENT)
Dept: RADIOLOGY | Facility: HOSPITAL | Age: 59
DRG: 871 | End: 2019-03-05
Payer: COMMERCIAL

## 2019-03-05 ENCOUNTER — APPOINTMENT (INPATIENT)
Dept: NON INVASIVE DIAGNOSTICS | Facility: HOSPITAL | Age: 59
DRG: 871 | End: 2019-03-05
Payer: COMMERCIAL

## 2019-03-05 ENCOUNTER — APPOINTMENT (INPATIENT)
Dept: DIALYSIS | Facility: HOSPITAL | Age: 59
DRG: 871 | End: 2019-03-05
Attending: INTERNAL MEDICINE
Payer: COMMERCIAL

## 2019-03-05 PROBLEM — L03.114 CELLULITIS OF LEFT FOREARM: Status: ACTIVE | Noted: 2019-03-05

## 2019-03-05 PROBLEM — I82.C19 ACUTE VENOUS EMBOLISM AND THROMBOSIS OF INTERNAL JUGULAR VEINS (HCC): Status: ACTIVE | Noted: 2019-03-05

## 2019-03-05 PROBLEM — E66.01 MORBID OBESITY (HCC): Status: ACTIVE | Noted: 2019-03-05

## 2019-03-05 PROBLEM — I48.92 ATRIAL FLUTTER (HCC): Status: ACTIVE | Noted: 2019-03-05

## 2019-03-05 LAB
ABO GROUP BLD BPU: NORMAL
ANION GAP SERPL CALCULATED.3IONS-SCNC: 8 MMOL/L (ref 4–13)
APTT PPP: 101 SECONDS (ref 26–38)
APTT PPP: 47 SECONDS (ref 26–38)
APTT PPP: 52 SECONDS (ref 26–38)
BASOPHILS # BLD AUTO: 0.09 THOUSANDS/ΜL (ref 0–0.1)
BASOPHILS NFR BLD AUTO: 1 % (ref 0–1)
BPU ID: NORMAL
BUN SERPL-MCNC: 35 MG/DL (ref 5–25)
CALCIUM SERPL-MCNC: 7.1 MG/DL (ref 8.3–10.1)
CHLORIDE SERPL-SCNC: 101 MMOL/L (ref 100–108)
CK SERPL-CCNC: 31 U/L (ref 39–308)
CO2 SERPL-SCNC: 24 MMOL/L (ref 21–32)
CREAT SERPL-MCNC: 4.39 MG/DL (ref 0.6–1.3)
CROSSMATCH: NORMAL
EOSINOPHIL # BLD AUTO: 1.79 THOUSAND/ΜL (ref 0–0.61)
EOSINOPHIL NFR BLD AUTO: 17 % (ref 0–6)
ERYTHROCYTE [DISTWIDTH] IN BLOOD BY AUTOMATED COUNT: 15.9 % (ref 11.6–15.1)
GFR SERPL CREATININE-BSD FRML MDRD: 14 ML/MIN/1.73SQ M
GLUCOSE SERPL-MCNC: 80 MG/DL (ref 65–140)
HCT VFR BLD AUTO: 25.4 % (ref 36.5–49.3)
HCT VFR BLD AUTO: 25.7 % (ref 36.5–49.3)
HCT VFR BLD AUTO: 28.7 % (ref 36.5–49.3)
HCT VFR BLD AUTO: 29.5 % (ref 36.5–49.3)
HGB BLD-MCNC: 7.9 G/DL (ref 12–17)
HGB BLD-MCNC: 8.1 G/DL (ref 12–17)
HGB BLD-MCNC: 8.9 G/DL (ref 12–17)
HGB BLD-MCNC: 9.1 G/DL (ref 12–17)
IMM GRANULOCYTES # BLD AUTO: 0.39 THOUSAND/UL (ref 0–0.2)
IMM GRANULOCYTES NFR BLD AUTO: 4 % (ref 0–2)
LYMPHOCYTES # BLD AUTO: 0.59 THOUSANDS/ΜL (ref 0.6–4.47)
LYMPHOCYTES NFR BLD AUTO: 5 % (ref 14–44)
MAGNESIUM SERPL-MCNC: 1.9 MG/DL (ref 1.6–2.6)
MCH RBC QN AUTO: 30 PG (ref 26.8–34.3)
MCHC RBC AUTO-ENTMCNC: 30.8 G/DL (ref 31.4–37.4)
MCV RBC AUTO: 97 FL (ref 82–98)
MONOCYTES # BLD AUTO: 0.59 THOUSAND/ΜL (ref 0.17–1.22)
MONOCYTES NFR BLD AUTO: 5 % (ref 4–12)
NEUTROPHILS # BLD AUTO: 7.42 THOUSANDS/ΜL (ref 1.85–7.62)
NEUTS SEG NFR BLD AUTO: 68 % (ref 43–75)
NRBC BLD AUTO-RTO: 0 /100 WBCS
PHOSPHATE SERPL-MCNC: 4.3 MG/DL (ref 2.7–4.5)
PLATELET # BLD AUTO: 138 THOUSANDS/UL (ref 149–390)
PMV BLD AUTO: 12.1 FL (ref 8.9–12.7)
POTASSIUM SERPL-SCNC: 4 MMOL/L (ref 3.5–5.3)
RBC # BLD AUTO: 3.03 MILLION/UL (ref 3.88–5.62)
SODIUM SERPL-SCNC: 133 MMOL/L (ref 136–145)
UNIT DISPENSE STATUS: NORMAL
UNIT PRODUCT CODE: NORMAL
UNIT RH: NORMAL
WBC # BLD AUTO: 10.87 THOUSAND/UL (ref 4.31–10.16)

## 2019-03-05 PROCEDURE — 85014 HEMATOCRIT: CPT | Performed by: INTERNAL MEDICINE

## 2019-03-05 PROCEDURE — 82550 ASSAY OF CK (CPK): CPT | Performed by: INTERNAL MEDICINE

## 2019-03-05 PROCEDURE — 90935 HEMODIALYSIS ONE EVALUATION: CPT | Performed by: INTERNAL MEDICINE

## 2019-03-05 PROCEDURE — 80048 BASIC METABOLIC PNL TOTAL CA: CPT | Performed by: INTERNAL MEDICINE

## 2019-03-05 PROCEDURE — 99233 SBSQ HOSP IP/OBS HIGH 50: CPT | Performed by: INTERNAL MEDICINE

## 2019-03-05 PROCEDURE — 92526 ORAL FUNCTION THERAPY: CPT

## 2019-03-05 PROCEDURE — 85018 HEMOGLOBIN: CPT | Performed by: INTERNAL MEDICINE

## 2019-03-05 PROCEDURE — 85025 COMPLETE CBC W/AUTO DIFF WBC: CPT | Performed by: INTERNAL MEDICINE

## 2019-03-05 PROCEDURE — 99232 SBSQ HOSP IP/OBS MODERATE 35: CPT | Performed by: INTERNAL MEDICINE

## 2019-03-05 PROCEDURE — 73201 CT UPPER EXTREMITY W/DYE: CPT

## 2019-03-05 PROCEDURE — 84100 ASSAY OF PHOSPHORUS: CPT | Performed by: INTERNAL MEDICINE

## 2019-03-05 PROCEDURE — 94762 N-INVAS EAR/PLS OXIMTRY CONT: CPT

## 2019-03-05 PROCEDURE — 85730 THROMBOPLASTIN TIME PARTIAL: CPT | Performed by: INTERNAL MEDICINE

## 2019-03-05 PROCEDURE — 83735 ASSAY OF MAGNESIUM: CPT | Performed by: INTERNAL MEDICINE

## 2019-03-05 PROCEDURE — 93005 ELECTROCARDIOGRAM TRACING: CPT

## 2019-03-05 RX ORDER — HEPARIN SODIUM 1000 [USP'U]/ML
10000 INJECTION, SOLUTION INTRAVENOUS; SUBCUTANEOUS AS NEEDED
Status: DISCONTINUED | OUTPATIENT
Start: 2019-03-05 | End: 2019-03-12

## 2019-03-05 RX ORDER — HEPARIN SODIUM 1000 [USP'U]/ML
5000 INJECTION, SOLUTION INTRAVENOUS; SUBCUTANEOUS AS NEEDED
Status: DISCONTINUED | OUTPATIENT
Start: 2019-03-05 | End: 2019-03-12

## 2019-03-05 RX ORDER — HEPARIN SODIUM 10000 [USP'U]/100ML
3-30 INJECTION, SOLUTION INTRAVENOUS
Status: DISCONTINUED | OUTPATIENT
Start: 2019-03-06 | End: 2019-03-11

## 2019-03-05 RX ADMIN — DILTIAZEM HYDROCHLORIDE 30 MG: 30 TABLET, FILM COATED ORAL at 14:59

## 2019-03-05 RX ADMIN — METOPROLOL TARTRATE 50 MG: 50 TABLET, FILM COATED ORAL at 15:38

## 2019-03-05 RX ADMIN — IODIXANOL 100 ML: 320 INJECTION, SOLUTION INTRAVASCULAR at 09:09

## 2019-03-05 RX ADMIN — POLYVINYL ALCOHOL 1 DROP: 14 SOLUTION/ DROPS OPHTHALMIC at 06:03

## 2019-03-05 RX ADMIN — HEPARIN SODIUM 5000 UNITS: 1000 INJECTION INTRAVENOUS; SUBCUTANEOUS at 22:53

## 2019-03-05 RX ADMIN — ACETAMINOPHEN 650 MG: 325 TABLET, FILM COATED ORAL at 09:48

## 2019-03-05 RX ADMIN — HYDROCORTISONE: 1 CREAM TOPICAL at 21:03

## 2019-03-05 RX ADMIN — VANCOMYCIN HYDROCHLORIDE 750 MG: 750 INJECTION, SOLUTION INTRAVENOUS at 14:22

## 2019-03-05 RX ADMIN — MELATONIN 6 MG: at 21:03

## 2019-03-05 RX ADMIN — ATORVASTATIN CALCIUM 40 MG: 40 TABLET, FILM COATED ORAL at 16:21

## 2019-03-05 RX ADMIN — Medication 20 MG: at 16:21

## 2019-03-05 RX ADMIN — HEPARIN SODIUM 10 UNITS/KG/HR: 10000 INJECTION, SOLUTION INTRAVENOUS at 05:52

## 2019-03-05 RX ADMIN — HEPARIN SODIUM 5000 UNITS: 1000 INJECTION INTRAVENOUS; SUBCUTANEOUS at 07:49

## 2019-03-05 RX ADMIN — HEPARIN SODIUM 7 UNITS/KG/HR: 10000 INJECTION, SOLUTION INTRAVENOUS at 16:11

## 2019-03-05 RX ADMIN — METRONIDAZOLE 500 MG: 500 TABLET ORAL at 09:48

## 2019-03-05 RX ADMIN — HYDROCORTISONE: 1 CREAM TOPICAL at 09:50

## 2019-03-05 RX ADMIN — DIPHENHYDRAMINE HCL 25 MG: 25 TABLET, FILM COATED ORAL at 05:54

## 2019-03-05 RX ADMIN — Medication 1 CAPSULE: at 16:21

## 2019-03-05 RX ADMIN — MIDODRINE HYDROCHLORIDE 10 MG: 5 TABLET ORAL at 09:48

## 2019-03-05 RX ADMIN — NYSTATIN: 100000 POWDER TOPICAL at 09:50

## 2019-03-05 RX ADMIN — CEFAZOLIN SODIUM 1000 MG: 10 INJECTION, POWDER, FOR SOLUTION INTRAVENOUS at 21:03

## 2019-03-05 RX ADMIN — METRONIDAZOLE 500 MG: 500 TABLET ORAL at 21:03

## 2019-03-05 RX ADMIN — Medication 20 MG: at 06:08

## 2019-03-05 RX ADMIN — METRONIDAZOLE 500 MG: 500 TABLET ORAL at 16:21

## 2019-03-05 RX ADMIN — NYSTATIN: 100000 POWDER TOPICAL at 21:04

## 2019-03-05 NOTE — PHYSICAL THERAPY NOTE
Physical Therapy Cancellation Note    ATTEMPTED TO TREAT PT THIS AM  PT CURRENTLY PENDING A STAT GENERAL SURGERY CONSULT AND IS OFF OF THE UNIT AT HEMODIALYSIS  WILL CONTINUE TO FOLLOW AS APPROPRIATE      Duglas Hinds PT, DPT

## 2019-03-05 NOTE — SPEECH THERAPY NOTE
Speech Language/Pathology    Speech/Language Pathology Progress Note    Patient Name: Greta Nolan  LMEUG'U Date: 3/5/2019    Subjective:  "I'm starving"  Objective:  Pt seen for f/u dysphagia tx to assess for possible upgrade in HD unit  Current diet: dysphagia level 2 with thin liquids  Pt observed eating a peanut butter sandwich  He is edentulous but claims he has been since the 80s and can eat "anything"  Pt demonstrated adequate and timely mastication and transfer  Observed with 2 sips of gingerale  No overt s/s aspiration observed  Assessment:  Tolerated advanced textures today without difficulty    Plan/Recommendations:  Upgrade to dysphagia level 3 with thin liquids  Assess with trial tray of regular to determine safety for further diet advancement

## 2019-03-05 NOTE — PLAN OF CARE
Problem: Potential for Falls  Goal: Patient will remain free of falls  Description  INTERVENTIONS:  - Assess patient frequently for physical needs  -  Identify cognitive and physical deficits and behaviors that affect risk of falls  -  [place] fall precautions as indicated by assessment   - Educate patient/family on patient safety including physical limitations  - Instruct patient to call for assistance with activity based on assessment  - Modify environment to reduce risk of injury  - Consider OT/PT consult to assist with strengthening/mobility    Outcome: Progressing     Problem: Prexisting or High Potential for Compromised Skin Integrity  Goal: Skin integrity is maintained or improved  Description  INTERVENTIONS:  - Identify patients at risk for skin breakdown  - Assess and monitor skin integrity  - Assess and monitor nutrition and hydration status  - Monitor labs (i e  albumin)  - Assess for incontinence   - Turn and reposition patient  - Assist with mobility/ambulation  - Relieve pressure over bony prominences  - Avoid friction and shearing  - Provide appropriate hygiene as needed including keeping skin clean and dry  - Evaluate need for skin moisturizer/barrier cream  - Collaborate with interdisciplinary team (i e  Nutrition, Rehabilitation, etc )   - Patient/family teaching   Outcome: Progressing     Problem: Nutrition/Hydration-ADULT  Goal: Nutrient/Hydration intake appropriate for improving, restoring or maintaining nutritional needs  Description  Monitor and assess patient's nutrition/hydration status for malnutrition (ex- brittle hair, bruises, dry skin, pale skin and conjunctiva, muscle wasting, smooth red tongue, and disorientation)  Collaborate with interdisciplinary team and initiate plan and interventions as ordered  Monitor patient's weight and dietary intake as ordered or per policy  Utilize nutrition screening tool and intervene per policy   Determine patient's food preferences and provide high-protein, high-caloric foods as appropriate  INTERVENTIONS:  - Monitor oral intake, urinary output, labs, and treatment plans  - Assess nutrition and hydration status and recommend course of action  - Evaluate amount of meals eaten  - Assist patient with eating if necessary   - Allow adequate time for meals  - Recommend/ encourage appropriate diets, oral nutritional supplements, and vitamin/mineral supplements  - Order, calculate, and assess calorie counts as needed  - Recommend, monitor, and adjust tube feedings and TPN/PPN based on assessed needs  - Assess need for intravenous fluids  - Provide specific nutrition/hydration education as appropriate  - Include patient/family/caregiver in decisions related to nutrition   Outcome: Progressing     Problem: CARDIOVASCULAR - ADULT  Goal: Maintains optimal cardiac output and hemodynamic stability  Description  INTERVENTIONS:  - Monitor I/O, vital signs and rhythm  - Monitor for S/S and trends of decreased cardiac output i e  bleeding, hypotension  - Administer and titrate ordered vasoactive medications to optimize hemodynamic stability  - Assess quality of pulses, skin color and temperature  - Assess for signs of decreased coronary artery perfusion - ex   Angina  - Instruct patient to report change in severity of symptoms   Outcome: Progressing  Goal: Absence of cardiac dysrhythmias or at baseline rhythm  Description  INTERVENTIONS:  - Continuous cardiac monitoring, monitor vital signs, obtain 12 lead EKG if indicated  - Administer antiarrhythmic and heart rate control medications as ordered  - Monitor electrolytes and administer replacement therapy as ordered   Outcome: Progressing     Problem: METABOLIC, FLUID AND ELECTROLYTES - ADULT  Goal: Electrolytes maintained within normal limits  Description  INTERVENTIONS:  - Monitor labs and assess patient for signs and symptoms of electrolyte imbalances  - Administer electrolyte replacement as ordered  - Monitor response to electrolyte replacements, including repeat lab results as appropriate  - Instruct patient on fluid and nutrition as appropriate   Outcome: Progressing  Goal: Fluid balance maintained  Description  INTERVENTIONS:  - Monitor labs and assess for signs and symptoms of volume excess or deficit  - Monitor I/O and WT  - Instruct patient on fluid and nutrition as appropriate   Outcome: Progressing     Problem: PAIN - ADULT  Goal: Verbalizes/displays adequate comfort level or baseline comfort level  Description  Interventions:  - Encourage patient to monitor pain and request assistance  - Assess pain using appropriate pain scale  - Administer analgesics based on type and severity of pain and evaluate response  - Implement non-pharmacological measures as appropriate and evaluate response  - Consider cultural and social influences on pain and pain management  - Notify physician/advanced practitioner if interventions unsuccessful or patient reports new pain   Outcome: Progressing     Problem: INFECTION - ADULT  Goal: Absence or prevention of progression during hospitalization  Description  INTERVENTIONS:  - Assess and monitor for signs and symptoms of infection  - Monitor lab/diagnostic results  - Monitor all insertion sites, i e  indwelling lines, tubes, and drains  - Monitor endotracheal (as able) and nasal secretions for changes in amount and color  - [place] appropriate cooling/warming therapies per order  - Administer medications as ordered  - Instruct and encourage patient and family to use good hand hygiene technique  - Identify and instruct in appropriate isolation precautions for identified infection/condition    Outcome: Progressing     Problem: SAFETY ADULT  Goal: Patient will remain free of falls  Description  INTERVENTIONS:  - Assess patient frequently for physical needs  -  Identify cognitive and physical deficits and behaviors that affect risk of falls    -  [place] fall precautions as indicated by assessment   - Educate patient/family on patient safety including physical limitations  - Instruct patient to call for assistance with activity based on assessment  - Modify environment to reduce risk of injury  - Consider OT/PT consult to assist with strengthening/mobility    Outcome: Progressing  Goal: Maintain or return to baseline ADL function  Description  INTERVENTIONS:  -  Assess patient's ability to carry out ADLs; assess patient's baseline for ADL function and identify physical deficits which impact ability to perform ADLs (bathing, care of mouth/teeth, toileting, grooming, dressing, etc )  - Assess/evaluate cause of self-care deficits   - Assess range of motion  - Assess patient's mobility; develop plan if impaired  - Assess patient's need for assistive devices and provide as appropriate  - Encourage maximum independence but intervene and supervise when necessary  ¯ Involve family in performance of ADLs  ¯ Assess for home care needs following discharge   ¯ Request OT consult to assist with ADL evaluation and planning for discharge  ¯ Provide patient education as appropriate    Outcome: Progressing  Goal: Maintain or return mobility status to optimal level  Description  INTERVENTIONS:  - Assess patient's baseline mobility status (ambulation, transfers, stairs, etc )    - Identify cognitive and physical deficits and behaviors that affect mobility  - Identify mobility aids required to assist with transfers and/or ambulation (gait belt, sit-to-stand, lift, walker, cane, etc )  - [place] fall precautions as indicated by assessment  - Record patient progress and toleration of activity level on Mobility SBAR; progress patient to next Phase/Stage  - Instruct patient to call for assistance with activity based on assessment  - Request Rehabilitation consult to assist with strengthening/weightbearing, etc     Outcome: Progressing     Problem: DISCHARGE PLANNING  Goal: Discharge to home or other facility with appropriate resources  Description  INTERVENTIONS:  - Identify barriers to discharge w/patient and caregiver  - Arrange for needed discharge resources and transportation as appropriate  - Identify discharge learning needs (meds, wound care, etc )  - Arrange for interpretive services to assist at discharge as needed  - Refer to Case Management Department for coordinating discharge planning if the patient needs post-hospital services based on physician/advanced practitioner order or complex needs related to functional status, cognitive ability, or social support system   Outcome: Progressing     Problem: Knowledge Deficit  Goal: Patient/family/caregiver demonstrates understanding of disease process, treatment plan, medications, and discharge instructions  Description  Complete learning assessment and assess knowledge base    Interventions:  - Provide teaching at level of understanding  - Provide teaching via preferred learning methods   Outcome: Progressing     Problem: GENITOURINARY - ADULT  Goal: Maintains or returns to baseline urinary function  Description  INTERVENTIONS:  - Assess urinary function  - Encourage oral fluids to ensure adequate hydration  - Administer IV fluids as ordered to ensure adequate hydration  - Administer ordered medications as needed  - Offer frequent toileting  - Follow urinary retention protocol if ordered   Outcome: Progressing  Goal: Absence of urinary retention  Description  INTERVENTIONS:  - Assess patient?s ability to void and empty bladder  - Monitor I/O  - Bladder scan as needed  - Discuss with physician/AP medications to alleviate retention as needed  - Discuss catheterization for long term situations as appropriate   Outcome: Progressing     Problem: DISCHARGE PLANNING - CARE MANAGEMENT  Goal: Discharge to post-acute care or home with appropriate resources  Description  INTERVENTIONS:  - Conduct assessment to determine patient/family and health care team treatment goals, and need for post-acute services based on payer coverage, community resources, and patient preferences, and barriers to discharge  - Address psychosocial, clinical, and financial barriers to discharge as identified in assessment in conjunction with the patient/family and health care team  - Arrange appropriate level of post-acute services according to patient's   needs and preference and payer coverage in collaboration with the physician and health care team  - Communicate with and update the patient/family, physician, and health care team regarding progress on the discharge plan  - Arrange appropriate transportation to post-acute venues  - Pt to d/c with appropriate resources when medically stable     Outcome: Progressing

## 2019-03-05 NOTE — ASSESSMENT & PLAN NOTE
- continue Cardizem/ Lopressor regimen per cardiology  - currently anticoagulated on heparin drip for a coexisting venous thrombus   - monitor/replete electrolytes as necessary - hypokalemia noted  - EF improved from 30 -> 55% on repeat limited echocardiogram likely from recovering septic shock

## 2019-03-05 NOTE — PROGRESS NOTES
Progress Note - Nephrology   Yulissa Mayers 60 y o  male MRN: [id number]  Unit/Bed#: [facility] 834-01 Encounter: [id number]    ASSESSMENT AND PLAN:  Patient is a 65-year-old male with a history of hypertension/morbid obesity/bioprosthetic aortic valve replacement who presented with shock and AK I from [address]:  His course was complicated by MSSA bacteremia/AK I/GI bleeding/atrial fibrillation: We are asked to follow for PATRICK on CKD now hemodialysis dependence after initial CRRT:    1  AK I secondary to ATN in setting of septic shock? AIN:  Patient may be ESRD will monitor closely  -HD dependent  -HD today  -right IJ permanent Deangelo catheter  -challenge estimated dry weight today    2  Volume:  Anasarca as outlined  HD/ultrafiltration  Essentially anuric  Hold diuretics  PVR the bladder scans negative  3 Electrolytes:    -hyponatremia:  Treat with fluid restriction and HD/UF    4  Mineral bone disorder:  Magnesium and phosphorus are acceptable  Hypocalcemia but may corrects for low albumin   -check ionized calcium in a m     5  Anemia of CKD:  Secondary to GI bleeding/  -hemoglobin acceptable 9 1    6  Hypotension:  On midodrine pre HD     7  Other issues:  -ID:  MSSA bacteremia per Infectious Disease  ? Myositis left upper extremity  Status post CT with contrast:?  Necrotizing fasciitis with nonspecific cellulitis and myositis  Surgery will be consulted  -GI bleeding:  Secondary to pyloric junction ulcer status post EGD x2; status post injection and clipping  Currently off Carafate as discussed yesterday with GI   -acute hypoxic respiratory failure improved  Challenge estimated dry weight   -stress cardiomyopathy in the setting of bacteremia  Resolved based on last echo  -Atrial flutter:  Per primary service/Cardiology  Subjective:   Patient is asymptomatic today  No significant arm pain at the moment  No nausea vomiting or diarrhea  No chest pain or shortness of Breath    Minimal urine output  Objective:     Vitals: Blood pressure 120/68, pulse 73, temperature 98 3 °F (36 8 °C), temperature source Oral, resp  rate 20, height 5' 9" (1 753 m), weight (!) 161 kg (354 lb 15 1 oz), SpO2 96 %  ,Body mass index is 52 42 kg/m²  Weight (last 2 days)     Date/Time   Weight    03/05/19 0600   161 (354 94)  (Abnormal)     03/04/19 1317   163 (358 69)  (Abnormal)     03/04/19 0600   77 1 (169 9)    03/03/19 1322   169 (373)  (Abnormal)  3/2 Post HD weight    Weight: 3/2 Post HD weight at 03/03/19 1322    03/03/19 0600   74 8 (164 8)                Intake/Output Summary (Last 24 hours) at 3/5/2019 1005  Last data filed at 3/4/2019 2042  Gross per 24 hour   Intake 655 ml   Output 0 ml   Net 655 ml       Permanent HD Catheter  (Active)   Reasons to continue HD Cath Treatment Therapy 2/26/2019  9:03 AM   Line Necessity Reviewed Yes, reviewed with provider 3/2/2019  8:00 AM   Site Assessment Clean;Dry; Intact 3/4/2019  7:01 PM   Proximal Lumen (Red Port-PICC only) Status Capped 3/4/2019  7:01 PM   Medial Lumen (Purple/White Port-PICC only) Status Capped 3/4/2019  7:01 PM   Distal Lumen ([place] Port-PICC only) Status Capped 3/4/2019  7:01 PM   Dressing Type Chlorhexidine dressing 3/4/2019  7:01 PM   Dressing Status Clean;Dry; Intact 3/4/2019  7:01 PM   Dressing Intervention Dressing changed 3/3/2019  9:00 AM   Dressing Change Due 03/10/19 3/3/2019  9:00 AM       Physical Exam: General:  Obese, sitting in bed without distress  Skin:  Minimal erythema on his right upper extremity otherwise no acute rash  Eyes:  No scleral icterus  ENT:  Moist mucous membranes  Neck:  Supple, no jugular venous distention  Chest:  Clear to auscultation  CVS:  No rubs or gallops appreciable  Abdomen:  Obese, soft and nontender with normal bowel sounds  Extremities:  Anasarca persists; left upper extremity is in a sling  Neuro:  No gross focality  Psych:  Alert and oriented                Medications:    Scheduled Meds:  Current Facility-Administered Medications:  acetaminophen 650 mg Oral Q6H PRN KATHY Elaine    atorvastatin 40 mg Oral Daily With Delbert Fox, [address]    b complex-vitamin C-folic acid 1 capsule Oral Daily With Jet Mai MD    bisacodyl 10 mg Rectal Daily PRN KATHY Elaine    cefazolin 1,000 mg Intravenous Q24H KATHY Elaine Last Rate: 1,000 mg (03/04/19 2128)   diltiazem 30 mg Oral Q6H [facility] Mele Hale MD    diphenhydrAMINE 25 mg Oral Q6H PRN Ellyn Aschoff, PA-C    heparin (porcine) 3-30 Units/kg/hr (Order-Specific) Intravenous Titrated Alisa Patel MD Last Rate: 11 Units/kg/hr (03/05/19 0750)   heparin (porcine) 2,500 Units Intravenous PRN Alisa Patel MD    heparin (porcine) 5,000 Units Intravenous PRN Alisa Patel MD    hydrocortisone  Topical BID KATHY Elaine    melatonin 6 mg Oral HS Leidy Martinez PA-C    metoprolol 5 mg Intravenous Q6H PRN Alisa Patel MD    metoprolol tartrate 50 mg Oral TID Mele Hale MD    metroNIDAZOLE 500 mg Oral TID Alisa Patel MD    midodrine 10 mg Oral Before Dialysis Mele Hale MD    nystatin  Topical BID KATHY Elaine    omeprazole (PRILOSEC) suspension 2 mg/mL 20 mg Oral BID AC Kristal KATHY Bond    oxyCODONE 5 mg Oral Q4H PRN Alisa Patel MD    oxyCODONE 7 5 mg Oral Q4H PRN Alisa Patel MD    polyvinyl alcohol 1 drop Both Eyes Q3H PRN KATHY Elaine    vancomycin 10 mg/kg (Adjusted) Intravenous Once per day on Tue Thu Sat Pako King MD        PRN Meds:   acetaminophen    bisacodyl    diphenhydrAMINE    heparin (porcine)    heparin (porcine)    metoprolol    midodrine    oxyCODONE    oxyCODONE    polyvinyl alcohol    Continuous Infusions:  heparin (porcine) 3-30 Units/kg/hr (Order-Specific) Last Rate: 11 Units/kg/hr (03/05/19 0750)       Lab, Imaging and other studies: I have personally reviewed pertinent labs    Laboratory Results:  Results from last 7 days   Lab Units 03/05/19  6877 03/05/19  0205 03/04/19  1936 03/04/19  1053 03/04/19  0449 03/04/19  0004 03/03/19  1950  03/03/19  9136  03/02/19  0617  03/01/19  0618 02/28/19  0511 02/27/19  0602   WBC Thousand/uL 10 87*  --   --   --  13 09*  --   --   --  12 71*  --  12 68*  --  12 78* 13 55* 15 35*   HEMOGLOBIN g/dL 9 1* 8 1* 8 2* 7 4* 7 2* 7 2* 7 6*   < > 7 4*   < > 7 5*   < > 8 2* 8 0* 8 2*   HEMATOCRIT % 29 5* 25 7* 26 5* 24 4* 23 9* 23 4* 24 6*   < > 24 1*   < > 25 1*   < > 26 8* 26 8* 26 6*   PLATELETS Thousands/uL 138*  --   --   --  157  --   --   --  141*  --  152  --  143* 134* 131*   POTASSIUM mmol/L 4 0  --   --   --  3 4*  --   --   --  3 2*  --  3 1*  --  3 2* 3 2* 3 9   CHLORIDE mmol/L 101  --   --   --  101  --   --   --  102  --  104  --  106 110* 108   CO2 mmol/L 24  --   --   --  27  --   --   --  29  --  27  --  29 24 24   BUN mg/dL 35*  --   --   --  29*  --   --   --  21  --  22  --  14 16 8   CREATININE mg/dL 4 39*  --   --   --  3 57*  --   --   --  2 87*  --  3 02*  --  2 19* 2 18* 1 21   CALCIUM mg/dL 7 1*  --   --   --  6 9*  --   --   --  6 8*  --  7 0*  --  7 1* 7 7* 8 2*   MAGNESIUM mg/dL 1 9  --   --   --  1 9  --   --   --  1 8  --  1 9  --  2 0 2 2 2 1   PHOSPHORUS mg/dL 4 3  --   --   --  3 5  --   --   --  3 0  --  2 8  --  2 4* 2 8 2 4*    < > = values in this interval not displayed       Urinalysis: Lab Results   Component Value Date    COLORU [place] 01/23/2019    COLORU Yellow 10/30/2014    CLARITYU Cloudy 01/23/2019    CLARITYU Clear 10/30/2014    SPECGRAV 1 025 01/23/2019    SPECGRAV 1 009 10/30/2014    PHUR 5 5 01/23/2019    PHUR 5 0 10/30/2014    LEUKOCYTESUR Negative 01/23/2019    LEUKOCYTESUR Negative 10/30/2014    NITRITE Positive (A) 01/23/2019    NITRITE Negative 10/30/2014    PROTEINUA Negative 10/30/2014    GLUCOSEU Negative 01/23/2019    GLUCOSEU Negative 10/30/2014    KETONESU Negative 01/23/2019    KETONESU Negative 10/30/2014    BILIRUBINUR Interference- unable to analyze (A) 01/23/2019    BILIRUBINUR Negative 10/30/2014    BLOODU Large (A) 01/23/2019    BLOODU Negative 10/30/2014     ABGs:   Lab Results   Component Value Date    PH 7 346 (L) 02/21/2019     Radiology review:     Portions of the record may have been created with voice recognition software   Occasional wrong word or "sound a like" substitutions may have occurred due to the inherent limitations of voice recognition software   Read the chart carefully and recognize, using context, where substitutions have occurred

## 2019-03-05 NOTE — PROGRESS NOTES
General Cardiology   Progress Note -  Team One   Marielena Sanches 60 y o  male MRN: [id number]    Unit/Bed#: [facility] 834-01 Encounter: [id number]    Assessment/ Plan:    Paroxysmal atrial fibrillation/flutter: Patient remains in rate controlled flutter; currently in the 70s  Metoprolol changes to 50mg TID to avoid hypotension  As well as low-dose Cardizem 30 mg every 6 hours  Patient was anticoagulated with warfarin prior to admission had significant GI bleeding, per per conversation with GI and patient's outpatient cardiologist Dr Lesa Kang the plan was to not resume warfarin; however patient has been placed on intravenous heparin for IJ thrombus      IJ thrombus:  Significant left upper extremity swelling; vascular evaluated the patient  On intravenous heparin; hemoglobin remaining stable     Stress cardiomyopathy:  With recovered EF  Prior EF 30-35% in setting of sepsis/bacteremia  Repeat limited echo 2/19/1 9 showed recovery with EF of 55%     Acute kidney injury:  Requiring CVVH and now intermittent HD, Tuesday Thursday Saturday as needed  He is volume overloaded on exam and being managed by HD; no urine output    Chronic LBBB:  Normal catheterization 2014     History of aortic stenosis status post bioprosthetic AVR 2014; no vegetation on CHON 1/25/19     Hypertension:  Blood pressure labile times on p  O  Metoprolol and diltiazem more for rate control     MSSA bacteremia:  On intravenous cefazolin; blood cultures 1/2 7/1 9 without growth       Subjective: pt seen for follow up while in HD; no events overnight  His only complaint is left arm pain  No chest pain or palpitations  Review of Systems   Constitution: Positive for malaise/fatigue  Negative for decreased appetite and fever  Cardiovascular: Positive for leg swelling  Negative for chest pain, dyspnea on exertion, orthopnea and palpitations  Respiratory: Negative for cough and shortness of breath      Gastrointestinal: Negative for nausea and vomiting  Genitourinary: Negative for dysuria  Neurological: Negative for dizziness and light-headedness  Psychiatric/Behavioral: Negative for altered mental status  All other systems reviewed and are negative  Objective:   Vitals: Blood pressure 153/74, pulse 67, temperature 97 8 °F (36 6 °C), temperature source Tympanic, resp  rate 20, height 5' 9" (1 753 m), weight (!) 161 kg (354 lb 15 1 oz), SpO2 96 %  ,     Body mass index is 52 42 kg/m²  ,     Systolic (25KRZ), GHO:951 , Min:96 , VBU:590     Diastolic (92FJE), WGH:50, Min:50, Max:79      Intake/Output Summary (Last 24 hours) at 3/5/2019 1156  Last data filed at 3/5/2019 1115  Gross per 24 hour   Intake 975 ml   Output 0 ml   Net 975 ml     Weight (last 2 days)     Date/Time   Weight    03/05/19 0600   161 (354 94)  (Abnormal)     03/04/19 1317   163 (358 69)  (Abnormal)     03/04/19 0600   77 1 (169 9)    03/03/19 1322   169 (373)  (Abnormal)  3/2 Post HD weight    Weight: 3/2 Post HD weight at 03/03/19 1322    03/03/19 0600   74 8 (164 8)            Telemetry Review:   Atrial flutter; rates adequately controlled  No further bradycardic events    Physical Exam   Constitutional: He is oriented to person, place, and time  No distress  Pt laying in bed getting HD; in NAD   HENT:   Head: Normocephalic and atraumatic  Cardiovascular: Normal rate, S1 normal and S2 normal  An irregular rhythm present  No murmur heard  Diffuse  edema   Pulmonary/Chest: Effort normal  No respiratory distress  He has no rales  BS distant and decreased anteriorly; on o2 via NC   Abdominal: Soft  obese   Musculoskeletal: He exhibits edema  Neurological: He is alert and oriented to person, place, and time  Skin: Skin is warm  He is not diaphoretic  Nursing note and vitals reviewed      LABORATORY RESULTS  Results from last 7 days   Lab Units 03/05/19  0638 03/04/19  0449   CK TOTAL U/L 31* 29*     CBC with diff: Results from last 7 days   Lab Units 03/05/19  1117 03/05/19  8282 03/05/19  0205 03/04/19  1936 03/04/19  1053 03/04/19  0449 03/04/19  0004  03/03/19  1575  03/02/19  0617 03/01/19 0618 02/28/19  0511 02/27/19  0602   WBC Thousand/uL  --  10 87*  --   --   --  13 09*  --   --  12 71*  --  12 68*  --  12 78* 13 55* 15 35*   HEMOGLOBIN g/dL 7 9* 9 1* 8 1* 8 2* 7 4* 7 2* 7 2*   < > 7 4*   < > 7 5*   < > 8 2* 8 0* 8 2*   HEMATOCRIT % 25 4* 29 5* 25 7* 26 5* 24 4* 23 9* 23 4*   < > 24 1*   < > 25 1*   < > 26 8* 26 8* 26 6*   MCV fL  --  97  --   --   --  98  --   --  98  --  98  --  99* 100* 97   PLATELETS Thousands/uL  --  138*  --   --   --  157  --   --  141*  --  152  --  143* 134* 131*   MCH pg  --  30 0  --   --   --  29 6  --   --  30 2  --  29 4  --  30 1 29 7 29 9   MCHC g/dL  --  30 8*  --   --   --  30 1*  --   --  30 7*  --  29 9*  --  30 6* 29 9* 30 8*   RDW %  --  15 9*  --   --   --  15 9*  --   --  15 9*  --  16 3*  --  16 2* 16 3* 16 6*   MPV fL  --  12 1  --   --   --  11 4  --   --  11 2  --  11 1  --  11 7 11 0 11 7   NRBC AUTO /100 WBCs  --  0  --   --   --  0  --   --  0  --  0  --  0  --  0    < > = values in this interval not displayed       CMP:  Results from last 7 days   Lab Units 03/05/19  4723 03/04/19  0449 03/03/19  4621 03/02/19  0617 03/01/19 0618 02/28/19  0511 02/27/19  0602   POTASSIUM mmol/L 4 0 3 4* 3 2* 3 1* 3 2* 3 2* 3 9   CHLORIDE mmol/L 101 101 102 104 106 110* 108   CO2 mmol/L 24 27 29 27 29 24 24   BUN mg/dL 35* 29* 21 22 14 16 8   CREATININE mg/dL 4 39* 3 57* 2 87* 3 02* 2 19* 2 18* 1 21   CALCIUM mg/dL 7 1* 6 9* 6 8* 7 0* 7 1* 7 7* 8 2*   AST U/L  --  10  --   --   --   --   --    ALT U/L  --  <6*  --   --   --   --   --    ALK PHOS U/L  --  78  --   --   --   --   --    EGFR ml/min/1 73sq m 14 18 23 22 32 32 65     BMP:  Results from last 7 days   Lab Units 03/05/19  0638 03/04/19  0449 03/03/19  0624 03/02/19  0617 03/01/19  0618 02/28/19  0511 02/27/19  0602   POTASSIUM mmol/L 4 0 3 4* 3 2* 3 1* 3 2* 3 2* 3 9   CHLORIDE mmol/L 101 101 102 104 106 110* 108   CO2 mmol/L 24 27 29 27 29 24 24   BUN mg/dL 35* 29* 21 22 14 16 8   CREATININE mg/dL 4 39* 3 57* 2 87* 3 02* 2 19* 2 18* 1 21   CALCIUM mg/dL 7 1* 6 9* 6 8* 7 0* 7 1* 7 7* 8 2*     Lab Results   Component Value Date    NTBNP 25,015 (H) 2019    NTBNP 25,548 (H) 2019     Results from last 7 days   Lab Units 19  0638 19  0449 19  0624 19  0617 19  0618 19  0511 19  0602   MAGNESIUM mg/dL 1 9 1 9 1 8 1 9 2 0 2 2 2 1     Results from last 7 days   Lab Units 19  1715   INR  1 28*     Lipid Profile:   Lab Results   Component Value Date    CHOL 146 2014    CHOL 145 2014     Lab Results   Component Value Date    HDL 44 2019    HDL 41 2018    HDL 52 2017     Lab Results   Component Value Date    LDLCALC 77 2019    LDLCALC 57 2018    LDLCALC 129 (H) 2017     Lab Results   Component Value Date    TRIG 129 2019    TRIG 102 2018    TRIG 71 2017     Cardiac testing:   Results for orders placed during the hospital encounter of 19   Echo complete with contrast if indicated    Narrative [address] [address]  [phone]    Transthoracic Echocardiogram  2D, Doppler, and Color Doppler    Study date:  2019    Patient: Bradly Baumgarten  MR number: [id number]  Account number: [de-identified]  : 1960  Age: 60 years  Gender: Male  Status: Inpatient  Location: Bedside  Height: 72 in  Weight: 339 lb  BP: 89/ 54 mmHg    Indications: chest pain    Diagnoses: R07 9 - Chest pain, unspecified    Sonographer:  MADHU Fernandez  Primary Physician:  Kj Bray DO  Referring Physician:  Ash Rosa DO  Group:  [facility]  Interpreting Physician:  Korey Allen DO    SUMMARY    PROCEDURE INFORMATION:  This was a technically difficult study despite the administration of echo contrast     LEFT VENTRICLE:  Systolic function was markedly reduced  Ejection fraction was estimated to be 30 %  There was severe diffuse hypokinesis with no obvious identifiable regional variations  Wall thickness was moderately increased  Concentric hypertrophy was present  VENTRICULAR SEPTUM:  There was dyssynergic motion  RIGHT VENTRICLE:  The ventricle was mildly dilated  Systolic function was moderately to markedly reduced  LEFT ATRIUM:  The atrium was mildly dilated  RIGHT ATRIUM:  The atrium was mildly dilated  AORTIC VALVE:  A bioprosthesis was present  It was not well visualized  Mean transvalvular gradient 13 mmHg  TRICUSPID VALVE:  There was mild regurgitation  PERICARDIUM:  A small, partially loculated pericardial effusion was identified along the left ventricular free wall  HISTORY: PRIOR HISTORY: Aortic valve prosthesis    PROCEDURE: The procedure was performed at the bedside  This was a routine study  The transthoracic approach was used  The study included complete 2D imaging, complete spectral Doppler, and color Doppler  The heart rate was 80 bpm, at the  start of the study  Intravenous contrast (Definity solution [1 3 ml Definity/8 7ml normal saline solution]) was administered  Intravenous contrast (Definity solution [1 3 ml Definity/8 7ml normal saline solution]) was administered to  opacify the left ventricle and enhance Doppler signals  Echocardiographic views were limited due to low windows and patient on mechanical ventilator  This was a technically difficult study despite the administration of echo contrast     LEFT VENTRICLE: Size was normal  Systolic function was markedly reduced  Ejection fraction was estimated to be 30 %  There was severe diffuse hypokinesis with no obvious identifiable regional variations  Wall thickness was moderately  increased  Concentric hypertrophy was present  DOPPLER: Normal sinus rhythm was absent   The study was not technically sufficient to allow evaluation of LV diastolic function  VENTRICULAR SEPTUM: There was dyssynergic motion  RIGHT VENTRICLE: The ventricle was mildly dilated  Systolic function was moderately to markedly reduced  LEFT ATRIUM: The atrium was mildly dilated  RIGHT ATRIUM: The atrium was mildly dilated  MITRAL VALVE: Not well visualized  DOPPLER: The transmitral velocity was within the normal range  There was no evidence for stenosis  There was no significant regurgitation  AORTIC VALVE: A bioprosthesis was present  It was not well visualized  Mean transvalvular gradient 13 mmHg  DOPPLER: There was no significant regurgitation  TRICUSPID VALVE: The valve structure was normal  There was normal leaflet separation  DOPPLER: The transtricuspid velocity was within the normal range  There was no evidence for stenosis  There was mild regurgitation  The tricuspid jet  envelope definition was inadequate for estimation of RV systolic pressure  PULMONIC VALVE: Leaflets exhibited normal thickness, no calcification, and normal cuspal separation  DOPPLER: The transpulmonic velocity was within the normal range  There was no significant regurgitation  PERICARDIUM: A small, partially loculated pericardial effusion was identified along the left ventricular free wall  The pericardium was normal in appearance  AORTA: The root exhibited normal size  SYSTEM MEASUREMENT TABLES    2D  %FS: 15 83 %  Ao Diam: 3 09 cm  EDV(Teich): 221 88 ml  EF(Teich): 32 54 %  ESV(Teich): 149 69 ml  IVSd: 1 59 cm  LA Diam: 5 18 cm  LVIDd: 6 58 cm  LVIDs: 5 54 cm  LVPWd: 1 43 cm  SV(Teich): 72 19 ml    CW  AV Env  Ti: 233 56 ms  AV VTI: 49 77 cm  AV Vmax: 2 64 m/s  AV Vmax: 2 67 m/s  AV Vmean: 2 13 m/s  AV maxP 95 mmHg  AV maxP 53 mmHg  AV meanP 73 mmHg    PW  LVOT Env  Ti: 215 22 ms  LVOT VTI: 11 41 cm  LVOT Vmax: 0 59 m/s  LVOT Vmax: 0 7 m/s  LVOT Vmean: 0 52 m/s  LVOT maxP 78 mmHg  LVOT maxP mmHg  LVOT meanP 24 mmHg  MV E Deep: 1 01 m/s    Inters\[facility] [place] [facility]\"" Commission Accredited Echocardiography Laboratory    Prepared and electronically signed by    Gardenia Harper DO  Signed 2019 10:14:55       Results for orders placed during the hospital encounter of 19   CHON    Narrative [address]  [facility], [address] [facility]  [phone]    Transesophageal Echocardiogram  2D, Doppler, and Color Doppler    Study date:  2019    Patient: Kita Ormond  MR number: [id number]  Account number: [de-identified]  : 1960  Age: 60 years  Gender: Male  Status: Inpatient  Location: Bedside  Height: 69 in  Weight: 319 lb  BP: 101/ 54 mmHg    Indications: Bacteremia  Diagnoses: R78 81 - Bacteremia    Sonographer:  Mary Ledbetter, [facility]  Interpreting Physician:  Jami Navarro DO  Primary Physician:  Igor Andersen DO  Referring Physician:  Alondra uHll DO  Group:  [facility]  Cardiology Fellow:  Roy Apgar, MD    IMPRESSIONS:  There was no echocardiographic evidence for valvular vegetation  SUMMARY    LEFT VENTRICLE:  Systolic function was moderately reduced  Ejection fraction was estimated to be 40 %  There was moderate diffuse hypokinesis  Wall thickness was mildly increased  There was mild concentric hypertrophy  RIGHT VENTRICLE:  The size was normal   Systolic function was mildly reduced  LEFT ATRIUM:  The atrium was mildly dilated  ATRIAL SEPTUM:  There was a small patent foramen ovale with left to right shunt identified by color Doppler  RIGHT ATRIUM:  The atrium was mildly dilated  MITRAL VALVE:  There was trace regurgitation  There was no echocardiographic evidence of vegetation  AORTIC VALVE:  A bioprosthesis was present  It exhibited normal function  There was no echocardiographic evidence of vegetation  TRICUSPID VALVE:  There was mild regurgitation    There was no echocardiographic evidence of vegetation  HISTORY: PRIOR HISTORY: Aortic valve replacement, NSTEMI, Cardiomyopathy, Acute kidney injury  PROCEDURE: The procedure was performed at the bedside  This was a routine study  The risks and alternatives of the procedure were explained to the patient's next of kin and informed consent was obtained  The transesophageal approach was  used  The study included complete 2D imaging, complete spectral Doppler, and color Doppler  The heart rate was 113 bpm, at the start of the study  An adult omniplane probe was inserted by the cardiology fellow under direct supervision of  the attending cardiologist  Intubated with ease  One intubation attempt(s)  There was no blood detected on the probe  Image quality was adequate  There were no complications during the procedure  MEDICATIONS: Sedation administered by  bedside nurse  LEFT VENTRICLE: Size was normal  Systolic function was moderately reduced  Ejection fraction was estimated to be 40 %  There was moderate diffuse hypokinesis  Wall thickness was mildly increased  There was mild concentric hypertrophy  DOPPLER: The study was not technically sufficient to allow evaluation of LV diastolic function  RIGHT VENTRICLE: The size was normal  Systolic function was mildly reduced  Wall thickness was normal     LEFT ATRIUM: The atrium was mildly dilated  No thrombus was identified  APPENDAGE: The appendage was small  No thrombus was identified  DOPPLER: The function was normal (normal emptying velocity)  ATRIAL SEPTUM: There was a small patent foramen ovale with left to right shunt identified by color Doppler  RIGHT ATRIUM: The atrium was mildly dilated  No thrombus was identified  MITRAL VALVE: Valve structure was normal  There was normal leaflet separation  There was no echocardiographic evidence of vegetation  DOPPLER: There was trace regurgitation  AORTIC VALVE: A bioprosthesis was present  It exhibited normal function   There was no echocardiographic evidence of vegetation  TRICUSPID VALVE: The valve structure was normal  There was normal leaflet separation  There was no echocardiographic evidence of vegetation  DOPPLER: There was mild regurgitation  PULMONIC VALVE: Leaflets exhibited normal thickness, no calcification, and normal cuspal separation  There was no echocardiographic evidence of vegetation  DOPPLER: There was no significant regurgitation  PERICARDIUM: There was no pericardial effusion seen in the images obtained  AORTA: The root exhibited normal size  There was no atheroma  There was no evidence for dissection  There was no evidence for aneurysm      [address] Accredited Echocardiography Laboratory    Prepared and electronically signed by    Samira Thornton DO  Signed 25-Jan-2019 14:01:14       Meds/Allergies   current meds:   Current Facility-Administered Medications   Medication Dose Route Frequency    acetaminophen (TYLENOL) tablet 650 mg  650 mg Oral Q6H PRN    atorvastatin (LIPITOR) tablet 40 mg  40 mg Oral Daily With Dinner    b complex-vitamin C-folic acid (NEPHROCAPS) capsule 1 capsule  1 capsule Oral Daily With Dinner    bisacodyl (DULCOLAX) rectal suppository 10 mg  10 mg Rectal Daily PRN    ceFAZolin (ANCEF) 1 g in sodium chloride 0 9% 50 ml IVPB  1,000 mg Intravenous Q24H    diltiazem (CARDIZEM) tablet 30 mg  30 mg Oral Q6H [address]    diphenhydrAMINE (BENADRYL) tablet 25 mg  25 mg Oral Q6H PRN    heparin (porcine) 25,000 units in 250 mL infusion (premix)  3-30 Units/kg/hr (Order-Specific) Intravenous Titrated    heparin (porcine) injection 2,500 Units  2,500 Units Intravenous PRN    heparin (porcine) injection 5,000 Units  5,000 Units Intravenous PRN    hydrocortisone 1 % cream   Topical BID    melatonin tablet 6 mg  6 mg Oral HS    metoprolol (LOPRESSOR) injection 5 mg  5 mg Intravenous Q6H PRN    metoprolol tartrate (LOPRESSOR) tablet 50 mg  50 mg Oral TID    metroNIDAZOLE (FLAGYL) tablet 500 mg  500 mg Oral TID    midodrine (PROAMATINE) tablet 10 mg  10 mg Oral Before Dialysis    nystatin (MYCOSTATIN) powder   Topical BID    omeprazole (PRILOSEC) suspension 2 mg/mL  20 mg Oral BID AC    oxyCODONE (ROXICODONE) IR tablet 5 mg  5 mg Oral Q4H PRN    oxyCODONE (ROXICODONE) IR tablet 7 5 mg  7 5 mg Oral Q4H PRN    polyvinyl alcohol (LIQUIFILM TEARS) 1 4 % ophthalmic solution 1 drop  1 drop Both Eyes Q3H PRN    vancomycin (VANCOCIN) IVPB (premix) 750 mg  10 mg/kg (Adjusted) Intravenous Once per day on Tue Thu Sat     Medications Prior to Admission   Medication    amLODIPine (NORVASC) 5 mg tablet    ascorbic acid (VITAMIN C) 500 MG tablet    aspirin (ECOTRIN) 325 mg EC tablet    fluticasone (FLONASE) 50 mcg/act nasal spray    loratadine (CLARITIN) 10 mg tablet    metoprolol tartrate (LOPRESSOR) 100 mg tablet    potassium chloride (K-DUR,KLOR-CON) 20 mEq tablet    pravastatin (PRAVACHOL) 40 mg tablet    torsemide (DEMADEX) 20 mg tablet       heparin (porcine) 3-30 Units/kg/hr (Order-Specific) Last Rate: 11 Units/kg/hr (03/05/19 0750)     Assessment:  Principal Problem:    MSSA bacteremia - Resolved septic shock  Active Problems:    Essential hypertension    Stress-induced cardiomyopathy    Acute respiratory failure with hypoxia (HCC)    History of aortic valve replacement with bioprosthetic valve    Persistent atrial fibrillation (HCC)    Thrombocytopenia (HCC)    Acute blood loss anemia - Gastric ulcer    Cerebrovascular accident ([address] )    Acute metabolic encephalopathy    Skin rash    Acute renal failure    Hypovolemic shock (HCC)    GI bleed    Left arm swelling    Deep tissue injury    Dysphagia    Cellulitis/myositis of left forearm    Left internal jugular vein thrombus    Atrial flutter/fibrillation - Stress-induced cardiomyopathy     Morbid obesity     Counseling / Coordination of Care  Total floor / unit time spent today 20 minutes    Greater than 50% of total time was spent with the patient and / or family counseling and / or coordination of care  ** Please Note: Dragon 360 Dictation voice to text software may have been used in the creation of this document   **

## 2019-03-05 NOTE — ASSESSMENT & PLAN NOTE
- appreciate Infectious Disease input - c/w IV Vancomycin/Ancef regimen (recent MSSA bacteemia noted)  - CT of forearm this morning noted per radiologist to be concerning and read as follows: "Extensive subcutaneous edema throughout the forearm with associated radial muscle edema concerning for nonspecific cellulitis and myositis  Additionally, there is deep fascial edema noted along the radial soft tissues of the proximal forearm raising suspicion for necrotizing fasciitis without onelia soft tissue emphysema at this time  No drainable abscess collection    No underlying osseous destructive changes to indicate osteomyelitis "  - obtain STAT general surgery consult - check CPK   - PRN pain control

## 2019-03-05 NOTE — MEDICAL STUDENT
Progress Note - Nephrology   Juan Alberto Ch 60 y o  male MRN: [id number]  Unit/Bed#: [facility] 834-01 Encounter: [id number]      Assessment and Plan:    1) PATRICK secondary to Septic Shock: Septic Shock developed in setting of LUE cellulitis  Patient is hemodialysis dependent, which is being performed through his right IJ catheter  Creatinine continues to rise  Patient to undergo HD today  Will receive dose of midodrine to allow better tolerance of HD  Continue daily evaluation of kidney function  Patient has significant edema that is being treated with HD  2) Cellulitis: Patient's CT this morning demonstrated non-specific edema as well as deep tissue edema  Necrotizing fasciitis could not be excluded  Surgery evaluated and stated that there was no indication for debridement at this time  Patient on metronidazole, ancef, and vancomycin  Patient remains afebrile with slight leukocytosis  3) Electrolytes: Potassium has improved to 4 0  Mildly hyponatremic to 133  Continue to treat with fluid restriction and HD  4) Mineral Bone Disorder: Mag and Phos normal  Calcium is low, however might be normal in setting of hypoalbuminemia  Ionized calcium to be checked  Subjective: The patient says that his pain in his left arm is improving, however redness persists  He continues to have bowel movements but is making little urine  He denies nausea, vomiting, diarrhea, constipation, shortness of breath, chest pain, and abdominal pain  He states his legs remain swollen and are not changed since yesterday  Last night he removed his left IJ catheter due to discomfort while sleeping  Objective:     Vitals: Blood pressure 98/55, pulse 85, temperature 97 8 °F (36 6 °C), temperature source Tympanic, resp  rate 20, height 5' 9" (1 753 m), weight (!) 161 kg (354 lb 15 1 oz), SpO2 96 %  ,Body mass index is 52 42 kg/m²      Weight (last 2 days)     Date/Time   Weight    03/05/19 0600   161 (354 94)  (Abnormal)     03/04/19 1317   163 (358 69)  (Abnormal)     03/04/19 0600   77 1 (169 9)    03/03/19 1322   169 (373)  (Abnormal)  3/2 Post HD weight    Weight: 3/2 Post HD weight at 03/03/19 1322    03/03/19 0600   74 8 (164 8)                Intake/Output Summary (Last 24 hours) at 3/5/2019 1308  Last data filed at 3/5/2019 1115  Gross per 24 hour   Intake 975 ml   Output 0 ml   Net 975 ml       Permanent HD Catheter  (Active)   Reasons to continue HD Cath Treatment Therapy 2/26/2019  9:03 AM   Line Necessity Reviewed Yes, reviewed with provider 3/5/2019 11:15 AM   Site Assessment Clean;Dry; Intact 3/5/2019 11:15 AM   Proximal Lumen (Red Port-PICC only) Status Blood return noted;Capped;Flushed 3/5/2019 11:15 AM   Medial Lumen (Purple/White Port-PICC only) Status Capped 3/4/2019  7:01 PM   Distal Lumen ([place] Port-PICC only) Status Blood return noted;Capped;Flushed 3/5/2019 11:15 AM   Dressing Type Chlorhexidine dressing 3/5/2019 11:15 AM   Dressing Status Clean;Dry; Intact 3/5/2019 11:15 AM   Dressing Intervention Dressing changed 3/3/2019  9:00 AM   Dressing Change Due 03/10/19 3/5/2019 11:15 AM       Physical Exam: General appearance: alert and oriented, in no acute distress  Eyes: no scleral icterus  Neck: no JVD and supple, symmetrical, trachea midline  Lungs: clear to auscultation bilaterally  Heart: regular rate and rhythm, S1, S2 normal, no murmur, click, rub or gallop  Abdomen: soft, non-tender; bowel sounds normal; no masses,  no organomegaly  Extremities: pitting edema in b/l lower extremities to level of thigh  Non-pitting edema in left hand    Skin: redness in left upper extremity  Neurologic: Grossly normal     Lab, Imaging and other studies:   CBC:   Lab Results   Component Value Date    WBC 10 87 (H) 03/05/2019    HGB 7 9 (L) 03/05/2019    HCT 25 4 (L) 03/05/2019    MCV 97 03/05/2019     (L) 03/05/2019    MCH 30 0 03/05/2019    MCHC 30 8 (L) 03/05/2019    RDW 15 9 (H) 03/05/2019    MPV 12 1 03/05/2019    NRBC 0 03/05/2019 CMP:   Lab Results   Component Value Date    SODIUM 133 (L) 03/05/2019    K 4 0 03/05/2019     03/05/2019    CO2 24 03/05/2019    BUN 35 (H) 03/05/2019    CREATININE 4 39 (H) 03/05/2019    CALCIUM 7 1 (L) 03/05/2019    EGFR 14 03/05/2019     Phosphorus:   Lab Results   Component Value Date    PHOS 4 3 03/05/2019     Magnesium:   Lab Results   Component Value Date    MG 1 9 03/05/2019     Ionized Calcium: No results found for: Charlee Davidson

## 2019-03-05 NOTE — ASSESSMENT & PLAN NOTE
- CHON negative for endocarditis  - currently on an IV Ancef regimen per ID - added IV Vancomycin due to suspicion of coexisting left forearm cellulitis with possible deep fascia extension

## 2019-03-05 NOTE — PLAN OF CARE
Problem: Potential for Falls  Goal: Patient will remain free of falls  Description  INTERVENTIONS:  - Assess patient frequently for physical needs  -  Identify cognitive and physical deficits and behaviors that affect risk of falls  -  [place] fall precautions as indicated by assessment   - Educate patient/family on patient safety including physical limitations  - Instruct patient to call for assistance with activity based on assessment  - Modify environment to reduce risk of injury  - Consider OT/PT consult to assist with strengthening/mobility    Outcome: Progressing     Problem: Prexisting or High Potential for Compromised Skin Integrity  Goal: Skin integrity is maintained or improved  Description  INTERVENTIONS:  - Identify patients at risk for skin breakdown  - Assess and monitor skin integrity  - Assess and monitor nutrition and hydration status  - Monitor labs (i e  albumin)  - Assess for incontinence   - Turn and reposition patient  - Assist with mobility/ambulation  - Relieve pressure over bony prominences  - Avoid friction and shearing  - Provide appropriate hygiene as needed including keeping skin clean and dry  - Evaluate need for skin moisturizer/barrier cream  - Collaborate with interdisciplinary team (i e  Nutrition, Rehabilitation, etc )   - Patient/family teaching   Outcome: Progressing     Problem: Nutrition/Hydration-ADULT  Goal: Nutrient/Hydration intake appropriate for improving, restoring or maintaining nutritional needs  Description  Monitor and assess patient's nutrition/hydration status for malnutrition (ex- brittle hair, bruises, dry skin, pale skin and conjunctiva, muscle wasting, smooth red tongue, and disorientation)  Collaborate with interdisciplinary team and initiate plan and interventions as ordered  Monitor patient's weight and dietary intake as ordered or per policy  Utilize nutrition screening tool and intervene per policy   Determine patient's food preferences and provide high-protein, high-caloric foods as appropriate  INTERVENTIONS:  - Monitor oral intake, urinary output, labs, and treatment plans  - Assess nutrition and hydration status and recommend course of action  - Evaluate amount of meals eaten  - Assist patient with eating if necessary   - Allow adequate time for meals  - Recommend/ encourage appropriate diets, oral nutritional supplements, and vitamin/mineral supplements  - Order, calculate, and assess calorie counts as needed  - Recommend, monitor, and adjust tube feedings and TPN/PPN based on assessed needs  - Assess need for intravenous fluids  - Provide specific nutrition/hydration education as appropriate  - Include patient/family/caregiver in decisions related to nutrition   Outcome: Progressing     Problem: CARDIOVASCULAR - ADULT  Goal: Maintains optimal cardiac output and hemodynamic stability  Description  INTERVENTIONS:  - Monitor I/O, vital signs and rhythm  - Monitor for S/S and trends of decreased cardiac output i e  bleeding, hypotension  - Administer and titrate ordered vasoactive medications to optimize hemodynamic stability  - Assess quality of pulses, skin color and temperature  - Assess for signs of decreased coronary artery perfusion - ex   Angina  - Instruct patient to report change in severity of symptoms   Outcome: Progressing  Goal: Absence of cardiac dysrhythmias or at baseline rhythm  Description  INTERVENTIONS:  - Continuous cardiac monitoring, monitor vital signs, obtain 12 lead EKG if indicated  - Administer antiarrhythmic and heart rate control medications as ordered  - Monitor electrolytes and administer replacement therapy as ordered   Outcome: Progressing     Problem: METABOLIC, FLUID AND ELECTROLYTES - ADULT  Goal: Electrolytes maintained within normal limits  Description  INTERVENTIONS:  - Monitor labs and assess patient for signs and symptoms of electrolyte imbalances  - Administer electrolyte replacement as ordered  - Monitor response to electrolyte replacements, including repeat lab results as appropriate  - Instruct patient on fluid and nutrition as appropriate   Outcome: Progressing  Goal: Fluid balance maintained  Description  INTERVENTIONS:  - Monitor labs and assess for signs and symptoms of volume excess or deficit  - Monitor I/O and WT  - Instruct patient on fluid and nutrition as appropriate   Outcome: Progressing     Problem: PAIN - ADULT  Goal: Verbalizes/displays adequate comfort level or baseline comfort level  Description  Interventions:  - Encourage patient to monitor pain and request assistance  - Assess pain using appropriate pain scale  - Administer analgesics based on type and severity of pain and evaluate response  - Implement non-pharmacological measures as appropriate and evaluate response  - Consider cultural and social influences on pain and pain management  - Notify physician/advanced practitioner if interventions unsuccessful or patient reports new pain   Outcome: Progressing     Problem: INFECTION - ADULT  Goal: Absence or prevention of progression during hospitalization  Description  INTERVENTIONS:  - Assess and monitor for signs and symptoms of infection  - Monitor lab/diagnostic results  - Monitor all insertion sites, i e  indwelling lines, tubes, and drains  - Monitor endotracheal (as able) and nasal secretions for changes in amount and color  - [place] appropriate cooling/warming therapies per order  - Administer medications as ordered  - Instruct and encourage patient and family to use good hand hygiene technique  - Identify and instruct in appropriate isolation precautions for identified infection/condition    Outcome: Progressing     Problem: SAFETY ADULT  Goal: Patient will remain free of falls  Description  INTERVENTIONS:  - Assess patient frequently for physical needs  -  Identify cognitive and physical deficits and behaviors that affect risk of falls    -  [place] fall precautions as indicated by assessment   - Educate patient/family on patient safety including physical limitations  - Instruct patient to call for assistance with activity based on assessment  - Modify environment to reduce risk of injury  - Consider OT/PT consult to assist with strengthening/mobility    Outcome: Progressing  Goal: Maintain or return to baseline ADL function  Description  INTERVENTIONS:  -  Assess patient's ability to carry out ADLs; assess patient's baseline for ADL function and identify physical deficits which impact ability to perform ADLs (bathing, care of mouth/teeth, toileting, grooming, dressing, etc )  - Assess/evaluate cause of self-care deficits   - Assess range of motion  - Assess patient's mobility; develop plan if impaired  - Assess patient's need for assistive devices and provide as appropriate  - Encourage maximum independence but intervene and supervise when necessary  ¯ Involve family in performance of ADLs  ¯ Assess for home care needs following discharge   ¯ Request OT consult to assist with ADL evaluation and planning for discharge  ¯ Provide patient education as appropriate    Outcome: Progressing  Goal: Maintain or return mobility status to optimal level  Description  INTERVENTIONS:  - Assess patient's baseline mobility status (ambulation, transfers, stairs, etc )    - Identify cognitive and physical deficits and behaviors that affect mobility  - Identify mobility aids required to assist with transfers and/or ambulation (gait belt, sit-to-stand, lift, walker, cane, etc )  - [place] fall precautions as indicated by assessment  - Record patient progress and toleration of activity level on Mobility SBAR; progress patient to next Phase/Stage  - Instruct patient to call for assistance with activity based on assessment  - Request Rehabilitation consult to assist with strengthening/weightbearing, etc     Outcome: Progressing     Problem: DISCHARGE PLANNING  Goal: Discharge to home or other facility with appropriate resources  Description  INTERVENTIONS:  - Identify barriers to discharge w/patient and caregiver  - Arrange for needed discharge resources and transportation as appropriate  - Identify discharge learning needs (meds, wound care, etc )  - Arrange for interpretive services to assist at discharge as needed  - Refer to Case Management Department for coordinating discharge planning if the patient needs post-hospital services based on physician/advanced practitioner order or complex needs related to functional status, cognitive ability, or social support system   Outcome: Progressing     Problem: Knowledge Deficit  Goal: Patient/family/caregiver demonstrates understanding of disease process, treatment plan, medications, and discharge instructions  Description  Complete learning assessment and assess knowledge base    Interventions:  - Provide teaching at level of understanding  - Provide teaching via preferred learning methods   Outcome: Progressing     Problem: GENITOURINARY - ADULT  Goal: Maintains or returns to baseline urinary function  Description  INTERVENTIONS:  - Assess urinary function  - Encourage oral fluids to ensure adequate hydration  - Administer IV fluids as ordered to ensure adequate hydration  - Administer ordered medications as needed  - Offer frequent toileting  - Follow urinary retention protocol if ordered   Outcome: Progressing  Goal: Absence of urinary retention  Description  INTERVENTIONS:  - Assess patient?s ability to void and empty bladder  - Monitor I/O  - Bladder scan as needed  - Discuss with physician/AP medications to alleviate retention as needed  - Discuss catheterization for long term situations as appropriate   Outcome: Progressing     Problem: DISCHARGE PLANNING - CARE MANAGEMENT  Goal: Discharge to post-acute care or home with appropriate resources  Description  INTERVENTIONS:  - Conduct assessment to determine patient/family and health care team treatment goals, and need for post-acute services based on payer coverage, community resources, and patient preferences, and barriers to discharge  - Address psychosocial, clinical, and financial barriers to discharge as identified in assessment in conjunction with the patient/family and health care team  - Arrange appropriate level of post-acute services according to patient's   needs and preference and payer coverage in collaboration with the physician and health care team  - Communicate with and update the patient/family, physician, and health care team regarding progress on the discharge plan  - Arrange appropriate transportation to post-acute venues  - Pt to d/c with appropriate resources when medically stable     Outcome: Progressing

## 2019-03-05 NOTE — HEMODIALYSIS
Hemodialysis treatment terminated 20 minutes early per Dr Flavia Lane due to tachycardia  CVC patent, vancomycin given  EKG done, cardiology called  Given extra 400 NSS bolus prior to taking off of machine  Otherwise stable treatment  No other issues noted during this treatment  Given Metoprolol and cardizem per cardiologist request as ordered

## 2019-03-05 NOTE — PROGRESS NOTES
Received call from hemodialysis unit that patient was tachycardic; heart rates elevated post hemodialysis; he was fluid challenged and removed 3 2 L  Evaluated patient  He is not complaining of any chest discomfort palpitations dizziness, shortness of breath or lightheadedness  Reviewed EKG showing atrial flutter which is his baseline, heart rates the 120s to 130s currently  Blood pressure stable, current systolic 167  The patient's chart he not receive his last 2 doses of 30 mg of oral Cardizem and also due for his metoprolol dose  He is hemodynamically stable and asymptomatic  Tachycardia likely related to fluid removal and lack of medications  Discussed with the nurse and they will get his Cardizem dose to give now

## 2019-03-05 NOTE — SOCIAL WORK
Cm reviewed patient during care coordination rounds with Dr River Bueno  Patient not stable for discharge today  Patient presented with possible necrotizing fasciitis  General surgery consulted regarding this  Cm following and updated both [facility] and [facility] with Roger Ku confirmed referral was made by fax rather than [employer])  Cm to provide [facility] or [facility] with updates as known at [phone]; Gabrielle's direct extension is [phone]  Cm following for anticipated discharge date once known

## 2019-03-05 NOTE — PROGRESS NOTES
Vancomycin IV Pharmacy-to-Dose Consultation    Pradip Barnes is a 60 y o  male who is currently receiving Vancomycin IV with management by the Pharmacy Consult service  Assessment/Plan:  The patient was reviewed  Patient is on HD on Tues, Thurs, Sat and no signs or symptoms of nephrotoxicity and/or infusion reactions were documented in the chart  Based on today?s assessment, continue current vancomycin (day # 4) dosing of 750mg (10mg/kg using adjusted wt due to obesity) post each HD session, with a plan for trough to be drawn at 0600 (with morning labs) on 3/9  We will continue to follow the patient?s culture results and clinical progress daily      Laila Hernandez, Pharmacist

## 2019-03-05 NOTE — PROGRESS NOTES
Renal addendum to today progress note:    Dialysis note:  I saw and examined patient during hemodialysis treatment at 1:11 PM on 3/5/2019  The patient was receiving hemodialysis for treatment of end stage renal disease  I also reviewed vital signs, intake and output, lab results and recent events, and agree with dialysis order  Tolerating HD  BP acceptable  Time: 4 hours  Qb: 400  Sodium: 138  Dialyzer: F160  Qd: 1 5 times Qb  Potassium: as per protocol  Access: perm cath UF goal: challenge UF as BP tolerated Bicarbonate: 35        Calcium 2 5    Addendum:  Near the end of dialysis treatment, patient became tachycardic with heart rate ranging anywhere from 130s to 160s with occasional heart rate in 180s  Stat EKG  Total 3 2 L UF removal on dialysis  Patient was given 400 mL saline back  Stop dialysis  Discussed with Theo Duffy from cardiology who will evaluate patient shortly

## 2019-03-05 NOTE — SPEECH THERAPY NOTE
Speech Language/Pathology    Speech/Language Pathology Progress Note    Patient Name: Jenifer Mon  [id number] Date: 3/5/2019     Attempted to see pt for f/u dysphagia tx at lunch  Pt off the floor at   Will f/u as able

## 2019-03-05 NOTE — PROGRESS NOTES
[address] Hospitalist Service - Internal Medicine Progress Note       PATIENT INFORMATION      Patient: Naseem Lombardi 60 y o  male   MRN: [id number]  PCP: Vj Hardin DO  Unit/Bed#: [facility] 834-01 Encounter: [id number]  Date Of Visit: 03/05/19       ASSESSMENTS & PLAN       MSSA bacteremia - Resolved septic shock  Assessment & Plan  - CHON negative for endocarditis  - currently on an IV Ancef regimen per ID - added IV Vancomycin due to suspicion of coexisting left forearm cellulitis with possible deep fascia extension    Cellulitis/myositis of left forearm  Assessment & Plan  - appreciate Infectious Disease input - c/w IV Vancomycin/Ancef regimen (recent MSSA bacteemia noted)  - CT of forearm this morning noted per radiologist to be concerning and read as follows: "Extensive subcutaneous edema throughout the forearm with associated radial muscle edema concerning for nonspecific cellulitis and myositis  Additionally, there is deep fascial edema noted along the radial soft tissues of the proximal forearm raising suspicion for necrotizing fasciitis without onelia soft tissue emphysema at this time  No drainable abscess collection    No underlying osseous destructive changes to indicate osteomyelitis "  - obtain STAT general surgery consult - check CPK   - PRN pain control     Acute renal failure  Assessment & Plan  - likely secondary to recent septic shock  - continue hemodialysis per nephrology  - limit/avoid nephrotoxins as possible - home Demadex held    - continue Nephrocaps  - CT imaging today w/ IV contrast exposure noted     Left internal jugular vein thrombus  Assessment & Plan  - continue heparin drip  - PRN pain control     Atrial flutter/fibrillation - Stress-induced cardiomyopathy   Assessment & Plan  - continue Cardizem/ Lopressor regimen per cardiology  - currently anticoagulated on heparin drip for a coexisting venous thrombus   - monitor/replete electrolytes as necessary - hypokalemia noted  - EF improved from 30 -> 55% on repeat limited echocardiogram likely from recovering septic shock    Acute blood loss anemia - Gastric ulcer  Assessment & Plan  - s/p injection/clipping  - c/w PPI regimen    Morbid obesity   Assessment & Plan  - BMI of 52 42  - lifestyle/diet modifications    Acute metabolic encephalopathy  Assessment & Plan  - waxing/waning - likely secondary to recovered septic shock in the setting of multiple medical issues  - clinical monitoring    Essential hypertension  Assessment & Plan  - continue Lopressor/Cardizem    History of aortic valve replacement with bioprosthetic valve  Assessment & Plan  - cardiology following  - no evidence of endocarditis on echocardiogram      VTE Prophylaxis:  Heparin drip       SUBJECTIVE     Seen/examined earlier this morning during nursing rounds  Does complain of generalized weakness/ pain more prominent on the lower extremities and left arm  Denies any fever/chills at this time  OBJECTIVE     Vitals:   Temp (24hrs), Av 7 °F (37 1 °C), Min:98 3 °F (36 8 °C), Max:99 1 °F (37 3 °C)    Temp:  [98 3 °F (36 8 °C)-99 1 °F (37 3 °C)] 98 3 °F (36 8 °C)  HR:  [55-78] 73  Resp:  [16-20] 20  BP: ()/(50-72) 120/68  SpO2:  [94 %-97 %] 96 %  Body mass index is 52 42 kg/m²  Input and Output Summary (last 24 hours):        Intake/Output Summary (Last 24 hours) at 3/5/2019 1009  Last data filed at 3/4/2019 2042  Gross per 24 hour   Intake 655 ml   Output 0 ml   Net 655 ml       Physical Exam:     GENERAL:  Obese - weak/fatigued  HEAD:  Normocephalic - atraumatic  EYES: PERRL - EOMI   MOUTH:  Mucosa moist  NECK:  Supple - full range of motion  CARDIAC:  Regular rate/rhythm - S1/S2 positive  PULMONARY:  Diminished breath sounds with respiratory effort due to body habitus   ABDOMEN:  Soft - nontender/nondistended - active bowel sounds - anasarca noted   MUSCULOSKELETAL:  Motor strength/range of motion markedly deconditioned - LE and UE pitting edema noted NEUROLOGIC:  Awake/alert   SKIN:  Left forearm erythema improved - chronic wrinkles/blemishes   PSYCHIATRIC:  Mood/affect flat      ADDITIONAL DATA       Labs & Recent Cultures:     Results from last 7 days   Lab Units 03/05/19  0638   WBC Thousand/uL 10 87*   HEMOGLOBIN g/dL 9 1*   HEMATOCRIT % 29 5*   PLATELETS Thousands/uL 138*   NEUTROS PCT % 68   LYMPHS PCT % 5*   MONOS PCT % 5   EOS PCT % 17*     Results from last 7 days   Lab Units 03/05/19  0638 03/04/19  0449   SODIUM mmol/L 133* 136   POTASSIUM mmol/L 4 0 3 4*   CHLORIDE mmol/L 101 101   CO2 mmol/L 24 27   BUN mg/dL 35* 29*   CREATININE mg/dL 4 39* 3 57*   CALCIUM mg/dL 7 1* 6 9*   ALK PHOS U/L  --  78   ALT U/L  --  <6*   AST U/L  --  10     Results from last 7 days   Lab Units 03/01/19  1715   INR  1 28*         Results from last 7 days   Lab Units 03/01/19  1441   C DIFF TOXIN B  NEGATIVE for C difficle toxin by PCR            Last 24 Hours Medication List:     Current Facility-Administered Medications:  acetaminophen 650 mg Oral Q6H PRN KATHY Crawford    atorvastatin 40 mg Oral Daily With Delbert Fox, [address]    b complex-vitamin C-folic acid 1 capsule Oral Daily With Noah Martinez MD    bisacodyl 10 mg Rectal Daily PRN KATHY Crawford    cefazolin 1,000 mg Intravenous Q24H KATHY Crawford Last Rate: 1,000 mg (03/04/19 2128)   diltiazem 30 mg Oral Q6H [address] Fouzia David MD    diphenhydrAMINE 25 mg Oral Q6H PRN Darlene Nava PA-C    heparin (porcine) 3-30 Units/kg/hr (Order-Specific) Intravenous Titrated Jovon Gomez MD Last Rate: 11 Units/kg/hr (03/05/19 0750)   heparin (porcine) 2,500 Units Intravenous PRN Jovon Gomez MD    heparin (porcine) 5,000 Units Intravenous PRN Jovon Gomez MD    hydrocortisone  Topical BID KATHY Crawford    melatonin 6 mg Oral HS Leidy Martinez PA-C    metoprolol 5 mg Intravenous Q6H PRN Jovon Gomez MD    metoprolol tartrate 50 mg Oral TID Fouzia David MD metroNIDAZOLE 500 mg Oral TID Efren Jiang MD    midodrine 10 mg Oral Before Dialysis Dolores Leahy MD    nystatin  Topical BID KATHY Medina    omeprazole (PRILOSEC) suspension 2 mg/mL 20 mg Oral BID AC Kristal KATHY Bond    oxyCODONE 5 mg Oral Q4H PRN Efren Jiang MD    oxyCODONE 7 5 mg Oral Q4H PRN Efren Jiang MD    polyvinyl alcohol 1 drop Both Eyes Q3H PRN KATHY Medina    vancomycin 10 mg/kg (Adjusted) Intravenous Once per day on Tue Thu Sat Ladonna Rees MD           Time Spent for Care: 36 minutes  More than 50% of total time spent on counseling and coordination of care as described above  Current Length of Stay: 40 day(s)      Code Status: Level 1 - Full Code        ** Please Note: This note is constructed using a voice recognition dictation system  An occasional wrong word/phrase or sound-a-like substitution may have been picked up by dictation device due to the inherent limitations of voice recognition software  Read the chart carefully and recognize, using reasonable context, where substitutions may have occurred  **

## 2019-03-05 NOTE — ASSESSMENT & PLAN NOTE
- waxing/waning - likely secondary to recovered septic shock in the setting of multiple medical issues  - clinical monitoring

## 2019-03-05 NOTE — PROGRESS NOTES
Pt returned from HD, per Dr Bentley Pratt at HD pt had episode of tachycardia/ a flutter for which cardio saw him and scheduled cardizem and lopressor were given by HD nurse  Did bring to Dr Nunez Hallmark attention that telemetry  earlier today and was not reordered  Ok given by Dr Silva Long to not place pt back on telemetry  Will continue to monitor

## 2019-03-05 NOTE — PROGRESS NOTES
Patient seen by myself and Dr Lucie Mathur while at [facility]  Patient well known to the surgical service during this admission  Most recently, re-called regareding patients Left upper extremity and concern for a necrotizing soft tissue infection  Patient reports having had pain in his left upper extremity for several months following an injury  He subsequently has developed substantial edema and erythema in the arm  Patient did have a history of a LIJ perm cath with subsequent no-occlusive acute DVT in the IJ  This was demonstrated on a 3/1 UE duplex    Vitals:    03/05/19 1130   BP: 153/74   Pulse: 67   Resp:    Temp:    SpO2:      Physical exam:  Left upper extremity- tense, edematous  No sign of crepitus  Palpable distal pulse movement sensation intact  CT UE: Extensive subcutaneous edema throughout the forearm with associated radial muscle edema concerning for nonspecific cellulitis and myositis  Additionally, there is deep fascial edema noted along the radial soft tissues of the proximal forearm raising   suspicion for necrotizing fasciitis without onelia soft tissue emphysema at this time  No drainable abscess collection    No underlying osseous destructive changes to indicate osteomyelitis        WBC decresaing since yesterday 10 8  Sodium 133      PLAN:   Keep arm elevated above heart  Recheck duplex to ensure that non occlusive DVT is not extending  No indication at this time for debridement   Will continue to monitor closely

## 2019-03-05 NOTE — PLAN OF CARE
Problem: Nutrition/Hydration-ADULT  Goal: Nutrient/Hydration intake appropriate for improving, restoring or maintaining nutritional needs  Description  Monitor and assess patient's nutrition/hydration status for malnutrition (ex- brittle hair, bruises, dry skin, pale skin and conjunctiva, muscle wasting, smooth red tongue, and disorientation)  Collaborate with interdisciplinary team and initiate plan and interventions as ordered  Monitor patient's weight and dietary intake as ordered or per policy  Utilize nutrition screening tool and intervene per policy  Determine patient's food preferences and provide high-protein, high-caloric foods as appropriate  INTERVENTIONS:  - Monitor oral intake, urinary output, labs, and treatment plans  - Assess nutrition and hydration status and recommend course of action  - Evaluate amount of meals eaten  - Assist patient with eating if necessary   - Allow adequate time for meals  - Recommend/ encourage appropriate diets, oral nutritional supplements, and vitamin/mineral supplements  - Order, calculate, and assess calorie counts as needed  - Recommend, monitor, and adjust tube feedings and TPN/PPN based on assessed needs  - Assess need for intravenous fluids  - Provide specific nutrition/hydration education as appropriate  - Include patient/family/caregiver in decisions related to nutrition   Outcome: Progressing  PO intake remains poor-fair, EN recommendations provided

## 2019-03-05 NOTE — NUTRITION
03/05/19 1406   Recommendations/Interventions   Summary Patient's appetite is poor-fair  PCA continues to assist with meals  PCA reports that patient is taking only a few sips of ensure supplements  Edema noted  Interventions Diet: continued as ordered; Supplement continue   Nutrition Recommendations Continue diet as ordered;Lab - consider order (specify); Other (specify)  (Secondary to suboptimal po intake suggest initiate alternate means of nutrition to provide 50% nutritional needs  Rec: Nepro @ a goal rate of 50 ml/hr from 7p-7a  Will monitor po intake and adjust EN accordingly   Monitor electrolytes, phosphorus   )

## 2019-03-05 NOTE — ASSESSMENT & PLAN NOTE
- likely secondary to recent septic shock  - continue hemodialysis per nephrology  - limit/avoid nephrotoxins as possible - home Demadex held    - continue Nephrocaps  - CT imaging today w/ IV contrast exposure noted

## 2019-03-06 ENCOUNTER — APPOINTMENT (INPATIENT)
Dept: NON INVASIVE DIAGNOSTICS | Facility: HOSPITAL | Age: 59
DRG: 871 | End: 2019-03-06
Payer: COMMERCIAL

## 2019-03-06 ENCOUNTER — APPOINTMENT (INPATIENT)
Dept: DIALYSIS | Facility: HOSPITAL | Age: 59
DRG: 871 | End: 2019-03-06
Attending: INTERNAL MEDICINE
Payer: COMMERCIAL

## 2019-03-06 LAB
ANION GAP SERPL CALCULATED.3IONS-SCNC: 6 MMOL/L (ref 4–13)
APTT PPP: 40 SECONDS (ref 26–38)
APTT PPP: 58 SECONDS (ref 26–38)
APTT PPP: 93 SECONDS (ref 26–38)
BASOPHILS # BLD AUTO: 0.07 THOUSANDS/ΜL (ref 0–0.1)
BASOPHILS NFR BLD AUTO: 1 % (ref 0–1)
BUN SERPL-MCNC: 23 MG/DL (ref 5–25)
CA-I BLD-SCNC: 0.99 MMOL/L (ref 1.12–1.32)
CALCIUM SERPL-MCNC: 7 MG/DL (ref 8.3–10.1)
CHLORIDE SERPL-SCNC: 101 MMOL/L (ref 100–108)
CO2 SERPL-SCNC: 28 MMOL/L (ref 21–32)
CREAT SERPL-MCNC: 3.19 MG/DL (ref 0.6–1.3)
EOSINOPHIL # BLD AUTO: 1.94 THOUSAND/ΜL (ref 0–0.61)
EOSINOPHIL NFR BLD AUTO: 16 % (ref 0–6)
ERYTHROCYTE [DISTWIDTH] IN BLOOD BY AUTOMATED COUNT: 15.5 % (ref 11.6–15.1)
GFR SERPL CREATININE-BSD FRML MDRD: 20 ML/MIN/1.73SQ M
GLUCOSE SERPL-MCNC: 81 MG/DL (ref 65–140)
HCT VFR BLD AUTO: 26.8 % (ref 36.5–49.3)
HCT VFR BLD AUTO: 27 % (ref 36.5–49.3)
HCT VFR BLD AUTO: 28.3 % (ref 36.5–49.3)
HGB BLD-MCNC: 8.1 G/DL (ref 12–17)
HGB BLD-MCNC: 8.6 G/DL (ref 12–17)
HGB BLD-MCNC: 8.6 G/DL (ref 12–17)
IMM GRANULOCYTES # BLD AUTO: 0.26 THOUSAND/UL (ref 0–0.2)
IMM GRANULOCYTES NFR BLD AUTO: 2 % (ref 0–2)
LYMPHOCYTES # BLD AUTO: 0.71 THOUSANDS/ΜL (ref 0.6–4.47)
LYMPHOCYTES NFR BLD AUTO: 6 % (ref 14–44)
MAGNESIUM SERPL-MCNC: 1.8 MG/DL (ref 1.6–2.6)
MCH RBC QN AUTO: 30.8 PG (ref 26.8–34.3)
MCHC RBC AUTO-ENTMCNC: 31.9 G/DL (ref 31.4–37.4)
MCV RBC AUTO: 97 FL (ref 82–98)
MONOCYTES # BLD AUTO: 0.68 THOUSAND/ΜL (ref 0.17–1.22)
MONOCYTES NFR BLD AUTO: 6 % (ref 4–12)
NEUTROPHILS # BLD AUTO: 8.45 THOUSANDS/ΜL (ref 1.85–7.62)
NEUTS SEG NFR BLD AUTO: 69 % (ref 43–75)
NRBC BLD AUTO-RTO: 0 /100 WBCS
PLATELET # BLD AUTO: 175 THOUSANDS/UL (ref 149–390)
PMV BLD AUTO: 11.7 FL (ref 8.9–12.7)
POTASSIUM SERPL-SCNC: 3.5 MMOL/L (ref 3.5–5.3)
RBC # BLD AUTO: 2.79 MILLION/UL (ref 3.88–5.62)
SODIUM SERPL-SCNC: 135 MMOL/L (ref 136–145)
WBC # BLD AUTO: 12.11 THOUSAND/UL (ref 4.31–10.16)

## 2019-03-06 PROCEDURE — 94762 N-INVAS EAR/PLS OXIMTRY CONT: CPT

## 2019-03-06 PROCEDURE — 80048 BASIC METABOLIC PNL TOTAL CA: CPT | Performed by: INTERNAL MEDICINE

## 2019-03-06 PROCEDURE — 90935 HEMODIALYSIS ONE EVALUATION: CPT | Performed by: INTERNAL MEDICINE

## 2019-03-06 PROCEDURE — 85018 HEMOGLOBIN: CPT | Performed by: PHYSICIAN ASSISTANT

## 2019-03-06 PROCEDURE — 97535 SELF CARE MNGMENT TRAINING: CPT

## 2019-03-06 PROCEDURE — 94760 N-INVAS EAR/PLS OXIMETRY 1: CPT

## 2019-03-06 PROCEDURE — 97110 THERAPEUTIC EXERCISES: CPT

## 2019-03-06 PROCEDURE — 99233 SBSQ HOSP IP/OBS HIGH 50: CPT | Performed by: INTERNAL MEDICINE

## 2019-03-06 PROCEDURE — 85014 HEMATOCRIT: CPT | Performed by: PHYSICIAN ASSISTANT

## 2019-03-06 PROCEDURE — 93971 EXTREMITY STUDY: CPT

## 2019-03-06 PROCEDURE — 83735 ASSAY OF MAGNESIUM: CPT | Performed by: INTERNAL MEDICINE

## 2019-03-06 PROCEDURE — 82330 ASSAY OF CALCIUM: CPT | Performed by: INTERNAL MEDICINE

## 2019-03-06 PROCEDURE — 97530 THERAPEUTIC ACTIVITIES: CPT

## 2019-03-06 PROCEDURE — 85730 THROMBOPLASTIN TIME PARTIAL: CPT | Performed by: INTERNAL MEDICINE

## 2019-03-06 PROCEDURE — 85025 COMPLETE CBC W/AUTO DIFF WBC: CPT | Performed by: INTERNAL MEDICINE

## 2019-03-06 PROCEDURE — 99232 SBSQ HOSP IP/OBS MODERATE 35: CPT | Performed by: INTERNAL MEDICINE

## 2019-03-06 PROCEDURE — 93971 EXTREMITY STUDY: CPT | Performed by: SURGERY

## 2019-03-06 RX ADMIN — NYSTATIN: 100000 POWDER TOPICAL at 17:14

## 2019-03-06 RX ADMIN — HYDROCORTISONE: 1 CREAM TOPICAL at 08:06

## 2019-03-06 RX ADMIN — ATORVASTATIN CALCIUM 40 MG: 40 TABLET, FILM COATED ORAL at 17:14

## 2019-03-06 RX ADMIN — MELATONIN 6 MG: at 21:44

## 2019-03-06 RX ADMIN — Medication 1 CAPSULE: at 17:14

## 2019-03-06 RX ADMIN — METRONIDAZOLE 500 MG: 500 TABLET ORAL at 08:06

## 2019-03-06 RX ADMIN — METOPROLOL TARTRATE 50 MG: 50 TABLET, FILM COATED ORAL at 08:06

## 2019-03-06 RX ADMIN — OXYCODONE HYDROCHLORIDE 5 MG: 5 TABLET ORAL at 07:37

## 2019-03-06 RX ADMIN — NYSTATIN: 100000 POWDER TOPICAL at 08:06

## 2019-03-06 RX ADMIN — DILTIAZEM HYDROCHLORIDE 30 MG: 30 TABLET, FILM COATED ORAL at 17:15

## 2019-03-06 RX ADMIN — MIDODRINE HYDROCHLORIDE 10 MG: 5 TABLET ORAL at 11:09

## 2019-03-06 RX ADMIN — HYDROCORTISONE: 1 CREAM TOPICAL at 17:14

## 2019-03-06 RX ADMIN — METOPROLOL TARTRATE 50 MG: 50 TABLET, FILM COATED ORAL at 17:14

## 2019-03-06 RX ADMIN — Medication 20 MG: at 05:47

## 2019-03-06 RX ADMIN — DIPHENHYDRAMINE HCL 25 MG: 25 TABLET, FILM COATED ORAL at 06:20

## 2019-03-06 RX ADMIN — HEPARIN SODIUM 8 UNITS/KG/HR: 10000 INJECTION, SOLUTION INTRAVENOUS at 00:22

## 2019-03-06 NOTE — PROGRESS NOTES
Pt scheduled for dialysis today at 1200  PO Cardizem order says to hold pre-dialysis on dialysis days  Order clarified with Sheba Andersen PA-C, will hold 0600 dose per her recommendation

## 2019-03-06 NOTE — PROGRESS NOTES
[address] Hospitalist Service - Internal Medicine Progress Note       PATIENT INFORMATION      Patient: Veronica Burkett 60 y o  male   MRN: [id number]  PCP: Thierry Carney DO  Unit/Bed#: [facility] 834-01 Encounter: [id number]  Date Of Visit: 03/06/19       ASSESSMENTS & PLAN       MSSA bacteremia - Resolved septic shock  Assessment & Plan  - CHON negative for endocarditis  - currently on an IV Vancomycin monotherapy regimen now per ID - off Ancef    Cellulitis/myositis of left forearm  Assessment & Plan  - appreciate Infectious Disease input - c/w IV Vancomycin   - CT of forearm on 3/5 noted per radiologist to be concerning and read as follows: "Extensive subcutaneous edema throughout the forearm with associated radial muscle edema concerning for nonspecific cellulitis and myositis  Additionally, there is deep fascial edema noted along the radial soft tissues of the proximal forearm raising suspicion for necrotizing fasciitis without onelia soft tissue emphysema at this time  No drainable abscess collection    No underlying osseous destructive changes to indicate osteomyelitis "  - CPK currently normal - appreciate general surgery who did not feel this is necrotizing fasciitis and recommended a repeat LUE venous doppler to assess progression of DVT which revealed no significant change from prior study with active presence of nonocclusive left IJ DVT along with nonocclusive superficial thrombophlebitis from the cephalic vein down to the antecubital fossa   - continue PRN pain control     Acute renal failure  Assessment & Plan  - likely secondary to recent septic shock  - continue hemodialysis per nephrology  - limit/avoid nephrotoxins as possible - home Demadex held    - continue Nephrocaps    Left internal jugular vein thrombus  Assessment & Plan  - continue heparin drip  - PRN pain control   - no significant changes on repeat doppler imaging study     Atrial flutter/fibrillation - Stress-induced cardiomyopathy Assessment & Plan  - continue Cardizem/ Lopressor regimen per cardiology  - currently anticoagulated on heparin drip for a coexisting venous thrombus   - monitor/replete electrolytes as necessary - hypokalemia resolved  - EF improved from 30 -> 55% on repeat limited echocardiogram likely from recovering septic shock    Acute blood loss anemia - Gastric ulcer  Assessment & Plan  - s/p injection/clipping  - c/w PPI regimen    Morbid obesity   Assessment & Plan  - BMI of 52 42  - lifestyle/diet modifications    Acute metabolic encephalopathy  Assessment & Plan  - waxing/waning - likely secondary to recovered septic shock in the setting of multiple medical issues  - clinical monitoring    Essential hypertension  Assessment & Plan  - continue Lopressor/Cardizem    History of aortic valve replacement with bioprosthetic valve  Assessment & Plan  - cardiology following  - no evidence of endocarditis on echocardiogram      VTE Prophylaxis:  Heparin drip       SUBJECTIVE     Seen/examined earlier today during hemodialysis  Still complains of nonspecific pains and neurologic weakness/fatigue  No other acute issues at this time  OBJECTIVE     Vitals:   Temp (24hrs), Av 2 °F (36 8 °C), Min:97 7 °F (36 5 °C), Max:98 7 °F (37 1 °C)    Temp:  [97 7 °F (36 5 °C)-98 7 °F (37 1 °C)] 98 °F (36 7 °C)  HR:  [] 71  Resp:  [16-24] 20  BP: ()/(52-77) 139/60  SpO2:  [92 %-98 %] 97 %  Body mass index is 54 37 kg/m²  Input and Output Summary (last 24 hours):        Intake/Output Summary (Last 24 hours) at 3/6/2019 1439  Last data filed at 3/6/2019 1304  Gross per 24 hour   Intake 1804 39 ml   Output 6183 ml   Net -4378 61 ml       Physical Exam:     GENERAL:  Obese - weak/fatigued  HEAD:  Normocephalic - atraumatic  EYES: PERRL - EOMI   MOUTH:  Mucosa moist  NECK:  Supple - full range of motion  CARDIAC:  Regular rate/rhythm - S1/S2 positive  PULMONARY:  Diminished to auscultation bilaterally  ABDOMEN:  Soft - nontender/nondistended - active bowel sounds - anasarca noted   MUSCULOSKELETAL:  Motor strength/range of motion markedly deconditioned - LE and UE pitting edema noted   NEUROLOGIC:  Awake/alert   SKIN:  Left forearm erythema noted- chronic wrinkles/blemishes   PSYCHIATRIC:  Mood/affect flat      ADDITIONAL DATA       Labs & Recent Cultures:     Results from last 7 days   Lab Units 03/06/19  1309 03/06/19  0553   WBC Thousand/uL  --  12 11*   HEMOGLOBIN g/dL 8 1* 8 6*   HEMATOCRIT % 26 8* 27 0*   PLATELETS Thousands/uL  --  175   NEUTROS PCT %  --  69   LYMPHS PCT %  --  6*   MONOS PCT %  --  6   EOS PCT %  --  16*     Results from last 7 days   Lab Units 03/06/19  0552  03/04/19  0449   SODIUM mmol/L 135*   < > 136   POTASSIUM mmol/L 3 5   < > 3 4*   CHLORIDE mmol/L 101   < > 101   CO2 mmol/L 28   < > 27   BUN mg/dL 23   < > 29*   CREATININE mg/dL 3 19*   < > 3 57*   CALCIUM mg/dL 7 0*   < > 6 9*   ALK PHOS U/L  --   --  78   ALT U/L  --   --  <6*   AST U/L  --   --  10    < > = values in this interval not displayed  Results from last 7 days   Lab Units 03/01/19  1715   INR  1 28*         Results from last 7 days   Lab Units 03/01/19  1441   C DIFF TOXIN B  NEGATIVE for C difficle toxin by PCR            Last 24 Hours Medication List:     Current Facility-Administered Medications:  acetaminophen 650 mg Oral Q6H PRN KATHY Maza    atorvastatin 40 mg Oral Daily With Manish Baker [address]    b complex-vitamin C-folic acid 1 capsule Oral Daily With Yesenia Pérez MD    bisacodyl 10 mg Rectal Daily PRN KATHY Maza    diltiazem 30 mg Oral Q6H [facility] Betty Marks MD    heparin (porcine) 3-30 Units/kg/hr (Order-Specific) Intravenous Titrated Robbie Mendez PA-C Last Rate: 8 Units/kg/hr (03/06/19 1420)   heparin (porcine) 10,000 Units Intravenous PRN Robbie Mendez PA-C    heparin (porcine) 5,000 Units Intravenous PRN Robbie Mendez PA-C    hydrocortisone  Topical BID Doreastevie Acharya, CRNP    melatonin 6 mg Oral HS Leidy Martinez PA-C    metoprolol 5 mg Intravenous Q6H PRN Sita Chavez MD    metoprolol tartrate 50 mg Oral TID Tara Mays MD    midodrine 10 mg Oral Before Dialysis Tara Mays MD    nystatin  Topical BID [facility], CRNP    omeprazole (PRILOSEC) suspension 2 mg/mL 20 mg Oral BID AC Kristal Bond, CRNP    oxyCODONE 5 mg Oral Q4H PRN Sita Chavez MD    oxyCODONE 7 5 mg Oral Q4H PRN Sita Chavez MD    polyvinyl alcohol 1 drop Both Eyes Q3H PRN [facility], CRNP    vancomycin 10 mg/kg (Adjusted) Intravenous Once per day on Tue Thu Sat Troy Pérez MD Last Rate: 750 mg (03/05/19 1422)          Time Spent for Care: 36 minutes  More than 50% of total time spent on counseling and coordination of care as described above  Current Length of Stay: 41 day(s)      Code Status: Level 1 - Full Code        ** Please Note: This note is constructed using a voice recognition dictation system  An occasional wrong word/phrase or sound-a-like substitution may have been picked up by dictation device due to the inherent limitations of voice recognition software  Read the chart carefully and recognize, using reasonable context, where substitutions may have occurred  **

## 2019-03-06 NOTE — PLAN OF CARE
Problem: OCCUPATIONAL THERAPY ADULT  Goal: Performs self-care activities at highest level of function for planned discharge setting  See evaluation for individualized goals  Description  Treatment Interventions: ADL retraining, Functional transfer training, UE strengthening/ROM, Endurance training, Cognitive reorientation, Patient/family training, Equipment evaluation/education, Fine motor coordination activities, Compensatory technique education, Continued evaluation, Energy conservation, Activityengagement          See flowsheet documentation for full assessment, interventions and recommendations  Outcome: Progressing  Note:   Limitation: Decreased ADL status, Decreased Safe judgement during ADL, Decreased cognition, Decreased endurance, Decreased fine motor control, Decreased self-care trans, Decreased high-level ADLs, Decreased UE ROM, Decreased UE strength  Prognosis: Fair  Assessment: Patient participated in Skilled OT session this date with interventions consisting of ADL re training with the use of correct body mechnaics, safety awareness and fall prevention techniques, therapeutic exercise to: increase functional use of BUEs, increase BUE muscle strength ,  therapeutic activities to: increase activity tolerance, increase postural control, increase trunk control and increase OOB/ sitting tolerance   Patient agreeable to OT treatment session, upon arrival patient was found supine in bed  In comparison to previous session, patient with improvements in sitting EOB tolerance*   Patient requiring verbal cues for safety, verbal cues for correct technique and verbal cues for pacing thru activity steps  Patient continues to be functioning below baseline level, occupational performance remains limited secondary to factors listed above and increased risk for falls and injury  From OT standpoint, recommendation at time of d/c would be Short Term Rehab     Patient to benefit from continued Occupational Therapy treatment while in the hospital to address deficits as defined above and maximize level of functional independence with ADLs and functional mobility  OT Discharge Recommendation: Short Term Rehab  OT - OK to Discharge:  Yes

## 2019-03-06 NOTE — PLAN OF CARE
Problem: PHYSICAL THERAPY ADULT  Goal: Performs mobility at highest level of function for planned discharge setting  See evaluation for individualized goals  Description  Treatment/Interventions: LE strengthening/ROM, Therapeutic exercise, Endurance training, Patient/family training, Cognitive reorientation, Equipment eval/education, Bed mobility, Spoke to advanced practitioner, Spoke to case management, Spoke to nursing  Equipment Recommended:  (TBD- may benefit from [facility] bed)       See flowsheet documentation for full assessment, interventions and recommendations  Outcome: Progressing  Note:   Prognosis: Fair  Problem List: Decreased strength, Decreased range of motion, Decreased endurance, Impaired balance, Decreased mobility, Decreased cognition, Impaired judgement, Decreased safety awareness, Obesity, Pain  Assessment: Pt resting in bed at time of PT treatment session  Pt initially lethargic, however was willing to participate in PT treatment session  With increased stimulation in room pt became more awake and alert  Pt able to perform al rolling in bed with mod A x 2 and supine to sit bed mobility with max A x 2 which is improved compared to previous session  VC and TC required for hand placement and safety during bed mobility  Pt able to sit EOB this session with poor seated balance noted  Pt required VC and TC as well as mod A x 1 to maintain upright seated posture  Pt noted to have posterior and L lateral lean in sitting  Pt live to tolerate and perform all therex seated EOB without any increase in complaints  VC and TC needed for proper form and pacing as well as physical assistance to perform therex through full available ROM  Cues also required to redirect pts attention throughout treatment session  Pt assisted back to bed at conclusion of PT session with all needs within reach  Pt denies any further questions at this time  PT will continue to follow   D/C recommendation when medically cleared is rehab due to decreased functional mobility compared to baseline and increased A needed from caregiver at current time  Barriers to Discharge: Inaccessible home environment     Recommendation: Short-term skilled PT     PT - OK to Discharge: Yes(to rehab when medically cleared )    See flowsheet documentation for full assessment

## 2019-03-06 NOTE — PROGRESS NOTES
NEPHROLOGY PROGRESS NOTE   Gisela Leary 60 y o  male MRN: [id number]  Unit/Bed#: [facility] 834-01 Encounter: [id number]  Reason for Consult: BRITTANY/CKD    ASSESSMENT AND PLAN:  Patient is a 43-year-old male with a history of hypertension/morbid obesity/bioprosthetic aortic valve replacement who presented with shock and AK I from [address]:  His course was complicated by MSSA bacteremia/AK I/GI bleeding/atrial fibrillation:  We are asked to follow for BRITTANY on CKD now hemodialysis dependence after initial CRRT:    BRITTANY, baseline serum creatinine 0 7 to 0 8  -BRITTANY most likely secondary to ischemic/septic ATN in the setting of septic shock, another concern for AIN given peripheral eosinophilia/rash  -now remains dialysis dependent  Status post HD yesterday and will do isolated ultrafiltration today for 2 L UF removal due to volume overload  -HD again tomorrow to keep him on TTS schedule  -currently has PermCath  Urine output remains poor  Continue to monitor for renal recovery  -recent urinalysis in January 2019 showed concentrated sample, greater than 3+ proteinuria, innumerable RBCs, positive nitrate  -CT scan last month showed no hydronephrosis  I saw and examined patient during UF treatment at 11:23 AM on 3/6/2019  The patient was receiving Iso UF for treatment of Brittany/Hypervolemia  I also reviewed vital signs, intake and output, lab results and recent events, and agree with dialysis order  Tolerating UF  BP acceptable  Time: 2 hours  Qb: 300  UF 2L    Volume overload/systolic CHF  -repeat echo showed EF 55%  -still seems volume overloaded, isolated ultrafiltration for UF removal as mentioned above   -post HD weight slowly reducing with last post HD weight 168 kg  Today pre HD weight and 167 kg with 2 L UF removal     MSSA bacteremia, CHON unremarkable  -currently remains on IV Ancef and IV vancomycin as per ID    Anemia, continue to closely monitor  Transfuse p r n   For hemoglobin less than seven   -if blood transfusion needed, would recommend all transplant precautions with leuko reduced, CMV-negative, irradiated etc   -check iron studies once infection issues are resolved  Borderline hyponatremia, serum sodium 135  Continue to monitor with dialysis  Peripheral eosinophilia/rash  -still continued to have peripheral eosinophilia  Concern for Amiodarone related versus antibiotic  Currently remains on Ancef for MSSA bacteremia   -continue to closely monitor   -rash slowly improving   -need to continue PPI due to recent GI bleed issues  ? Myositis left upper extremity  Status post CT with IV contrast (on 3/5/19):? Necrotizing fasciitis with nonspecific cellulitis and myositis  Low surgery follow-up  -GI bleeding:  Secondary to pyloric junction ulcer status post EGD x2; status post injection and clipping  Currently off Carafate   -acute hypoxic respiratory failure improved   Challenge estimated dry weight   -stress cardiomyopathy in the setting of bacteremia   Resolved based on last echo  -Atrial flutter:  Per primary service/Cardiology  SUBJECTIVE:  Patient seen and examined at bedside  Patient feels overall tired and somewhat confused at the time of my encounter        OBJECTIVE:  Current Weight: Weight - Scale: (!) 167 kg (368 lb 2 7 oz)  Vitals:    03/06/19 1040   BP: 120/67   Pulse: 72   Resp: 20   Temp: 98 °F (36 7 °C)   SpO2: 97%       Intake/Output Summary (Last 24 hours) at 3/6/2019 1101  Last data filed at 3/5/2019 2253  Gross per 24 hour   Intake 1504 39 ml   Output 3683 ml   Net -2178 61 ml       Physical Examination:  General:  Lying in bed, no acute distress   Eyes:  Mild conjunctival pallor present  ENT:  External examination of ears and nose unremarkable  Neck:  Supple  Respiratory:  Bilateral decreased breath sound at bases, poor respiratory effort  CVS:  S1, S2 present  GI:  Soft, nontender, nondistended  CNS:  Active alert oriented x2  Extremities:  2+ edema in both legs  Skin: Rash overall slowly improving    Medications:    Current Facility-Administered Medications:     acetaminophen (TYLENOL) tablet 650 mg, 650 mg, Oral, Q6H PRN, KATHY Bang, 650 mg at 03/05/19 0948    atorvastatin (LIPITOR) tablet 40 mg, 40 mg, Oral, Daily With Dinner, KATHY Bang, 40 mg at 03/05/19 1621    b complex-vitamin C-folic acid (NEPHROCAPS) capsule 1 capsule, 1 capsule, Oral, Daily With Helen Singer MD, 1 capsule at 03/05/19 1621    bisacodyl (DULCOLAX) rectal suppository 10 mg, 10 mg, Rectal, Daily PRN, KATHY Bang    ceFAZolin (ANCEF) 1 g in sodium chloride 0 9% 50 ml IVPB, 1,000 mg, Intravenous, Q24H, KATHY Lugo, Last Rate: 100 mL/hr at 03/05/19 2103, 1,000 mg at 03/05/19 2103    diltiazem (CARDIZEM) tablet 30 mg, 30 mg, Oral, Q6H [facility], Tonny Nelson MD, 30 mg at 03/05/19 1459    heparin (porcine) 25,000 units in 250 mL infusion (premix), 3-30 Units/kg/hr (Order-Specific), Intravenous, Titrated, [place], PA-C, Last Rate: 9 7 mL/hr at 03/06/19 0724, 6 Units/kg/hr at 03/06/19 0724    heparin (porcine) injection 10,000 Units, 10,000 Units, Intravenous, PRN, [place], PA-C    heparin (porcine) injection 5,000 Units, 5,000 Units, Intravenous, PRN, [place], PA-C    hydrocortisone 1 % cream, , Topical, BID, KATHY Lugo    melatonin tablet 6 mg, 6 mg, Oral, HSLeidy, PA-C, 6 mg at 03/05/19 2103    metoprolol (LOPRESSOR) injection 5 mg, 5 mg, Intravenous, Q6H PRN, Gisela Garcia MD, 5 mg at 03/02/19 1150    metoprolol tartrate (LOPRESSOR) tablet 50 mg, 50 mg, Oral, TID, Tonny Nelson MD, 50 mg at 03/06/19 0806    metroNIDAZOLE (FLAGYL) tablet 500 mg, 500 mg, Oral, TID, Gisela Garcia MD, 500 mg at 03/06/19 0806    midodrine (PROAMATINE) tablet 10 mg, 10 mg, Oral, Before Dialysis, Tonny Nelson MD, 10 mg at 03/05/19 0948    nystatin (MYCOSTATIN) powder, , Topical, BID, KATHY Bang    omeprazole (PRILOSEC) suspension 2 mg/mL, 20 mg, Oral, BID AC, KATHY Lugo, 20 mg at 03/06/19 0547    oxyCODONE (ROXICODONE) IR tablet 5 mg, 5 mg, Oral, Q4H PRN, Josh Damon MD, 5 mg at 03/06/19 0737    oxyCODONE (ROXICODONE) IR tablet 7 5 mg, 7 5 mg, Oral, Q4H PRN, Josh Damon MD, 7 5 mg at 03/03/19 1600    polyvinyl alcohol (LIQUIFILM TEARS) 1 4 % ophthalmic solution 1 drop, 1 drop, Both Eyes, Q3H PRN, KATHY Tapia, 1 drop at 03/05/19 0603    vancomycin (VANCOCIN) IVPB (premix) 750 mg, 10 mg/kg (Adjusted), Intravenous, Once per day on Tue Thu Sat, Damian Cook MD, Last Rate: 150 mL/hr at 03/05/19 1422, 750 mg at 03/05/19 1422    Laboratory Results:  Results from last 7 days   Lab Units 03/06/19  0553 03/06/19  0552 03/05/19  1923 03/05/19  1117 03/05/19  0807 03/05/19  0205 03/04/19  1936 03/04/19  1053 03/04/19  0449  03/03/19  0624  03/02/19  0617  03/01/19  0618 02/28/19  0511   WBC Thousand/uL 12 11*  --   --   --  10 87*  --   --   --  13 09*  --  12 71*  --  12 68*  --  12 78* 13 55*   HEMOGLOBIN g/dL 8 6*  --  8 9* 7 9* 9 1* 8 1* 8 2* 7 4* 7 2*   < > 7 4*   < > 7 5*   < > 8 2* 8 0*   HEMATOCRIT % 27 0*  --  28 7* 25 4* 29 5* 25 7* 26 5* 24 4* 23 9*   < > 24 1*   < > 25 1*   < > 26 8* 26 8*   PLATELETS Thousands/uL 175  --   --   --  138*  --   --   --  157  --  141*  --  152  --  143* 134*   POTASSIUM mmol/L  --  3 5  --   --  4 0  --   --   --  3 4*  --  3 2*  --  3 1*  --  3 2* 3 2*   CHLORIDE mmol/L  --  101  --   --  101  --   --   --  101  --  102  --  104  --  106 110*   CO2 mmol/L  --  28  --   --  24  --   --   --  27  --  29  --  27  --  29 24   BUN mg/dL  --  23  --   --  35*  --   --   --  29*  --  21  --  22  --  14 16   CREATININE mg/dL  --  3 19*  --   --  4 39*  --   --   --  3 57*  --  2 87*  --  3 02*  --  2 19* 2 18*   CALCIUM mg/dL  --  7 0*  --   --  7 1*  --   --   --  6 9*  --  6 8*  --  7 0*  --  7 1* 7 7*   MAGNESIUM mg/dL  --  1 8  --   --  1 9  --   --   --  1 9 --  1 8  --  1 9  --  2 0 2 2   PHOSPHORUS mg/dL  --   --   --   --  4 3  --   --   --  3 5  --  3 0  --  2 8  --  2 4* 2 8    < > = values in this interval not displayed  Results for orders placed during the hospital encounter of 01/24/19   XR chest portable    Narrative CHEST     INDICATION:   ETT placement  COMPARISON:  2/21/2019    EXAM PERFORMED/VIEWS:  XR CHEST PORTABLE      FINDINGS:      There has been interval placement of an endotracheal tube which projects approximately 3 cm above the raad  Lines and tubes are otherwise unchanged from prior exam     Heart shadow is enlarged but unchanged from prior exam     There is pulmonary vascular congestion which appears unchanged from the prior study  Osseous structures appear within normal limits for patient age  Impression Endotracheal tube in appropriate position  Cardiomegaly with stable mild pulmonary vascular congestion  Workstation performed: [id number]       No results found for this or any previous visit  No results found for this or any previous visit  No results found for this or any previous visit  No results found for this or any previous visit  No results found for this or any previous visit  Portions of the record may have been created with voice recognition software  Occasional wrong word or "sound a like" substitutions may have occurred due to the inherent limitations of voice recognition software  Read the chart carefully and recognize, using context, where substitutions have occurred

## 2019-03-06 NOTE — PROGRESS NOTES
General Cardiology   Progress Note -  Team One   Veronica Burkett 60 y o  male MRN: [id number]    Unit/Bed#: [facility] 834-01 Encounter: [id number]    Assessment/ Plan:    Paroxysmal atrial fibrillation/flutter: Pt rates improved with just getting scheduled PO meds yesterday  BPs labile without any significant hypotension     - Continue Metoprolol  50mg TID to avoid hypotension and Cardizem 30 mg every 6 hours  Patient was anticoagulated with warfarin prior to admission had significant GI bleeding, per per conversation with GI and patient's outpatient cardiologist Dr Perry the plan was to not resume warfarin; however patient has been placed on intravenous heparin for IJ thrombus with stable hgb      IJ thrombus:  Significant left upper extremity swelling; vascular evaluated the patient   On intravenous heparin; hemoglobin remaining stable     Stress cardiomyopathy:  With recovered EF   Prior EF 30-35% in setting of sepsis/bacteremia  Colten Wallace limited echo 2/19/19 showed recovery with EF of 55%     Acute kidney injury:  Requiring CVVH and now intermittent HD, Tuesday Thursday Saturday as needed  Pamela Tomas is volume overloaded on exam and being managed by HD; no urine output     Chronic LBBB:  Normal catheterization 2014     History of aortic stenosis status post bioprosthetic AVR 2014; no vegetation on CHON 1/25/19     Hypertension:  Blood pressure labile times on p  O  Metoprolol and diltiazem more for rate control     MSSA bacteremia:  On intravenous cefazolin; blood cultures 1/27/19 without growth      Subjective: pt seen for follow up  He has left arm pain but no chest pain, palpitations or SOB  He became tachycardic yesterday after HD but improved after getting some fluid back and getting his oral medications  He is not on telemetry but no further tachycardic events  Review of Systems   Constitution: Positive for malaise/fatigue  Negative for decreased appetite and fever     Cardiovascular: Positive for leg swelling  Negative for chest pain, irregular heartbeat, orthopnea and palpitations  Respiratory: Negative for cough, shortness of breath and wheezing  Gastrointestinal: Negative for nausea and vomiting  Genitourinary:        No UO   Neurological: Negative for dizziness and light-headedness  Psychiatric/Behavioral: Negative for altered mental status  All other systems reviewed and are negative  Objective:   Vitals: Blood pressure 125/77, pulse (!) 54, temperature 97 7 °F (36 5 °C), temperature source Oral, resp  rate 20, height 5' 9" (1 753 m), weight (!) 167 kg (368 lb 2 7 oz), SpO2 98 %  ,     Body mass index is 54 37 kg/m²  ,     Systolic (78EXD), IDC:080 , Min:88 , HFD:384     Diastolic (57LYE), AKK:71, Min:36, Max:79      Intake/Output Summary (Last 24 hours) at 3/6/2019 1003  Last data filed at 3/5/2019 2253  Gross per 24 hour   Intake 1504 39 ml   Output 3683 ml   Net -2178 61 ml     Weight (last 2 days)     Date/Time   Weight    03/06/19 0600   167 (368 17)  (Abnormal)     03/05/19 0600   161 (354 94)  (Abnormal)     03/04/19 1317   163 (358 69)  (Abnormal)     03/04/19 0600   77 1 (169 9)            Telemetry Review:   No telemetry    Physical Exam   Constitutional: He is oriented to person, place, and time  No distress  Pt laying in bed in NAD   HENT:   Head: Normocephalic and atraumatic  Cardiovascular: Normal rate, S1 normal and S2 normal  An irregular rhythm present  No murmur heard  Diffuse edema   Pulmonary/Chest: Effort normal and breath sounds normal    BS distant, no rales,  On RA   Abdominal: Soft  obese   Musculoskeletal:   LUE + edema/swelling   Neurological: He is alert and oriented to person, place, and time  Skin: Skin is warm and dry  He is not diaphoretic  Nursing note and vitals reviewed      LABORATORY RESULTS  Results from last 7 days   Lab Units 03/05/19  0638 03/04/19  0449   CK TOTAL U/L 31* 29*     CBC with diff: Results from last 7 days   Lab Units 03/06/19  0553 03/05/19  1923 03/05/19  1117 03/05/19  1919 03/05/19  0205 03/04/19  1936 03/04/19  1053 03/04/19 0449 03/03/19 0624  03/02/19 0617 03/01/19 0618 02/28/19  0511   WBC Thousand/uL 12 11*  --   --  10 87*  --   --   --  13 09*  --  12 71*  --  12 68*  --  12 78* 13 55*   HEMOGLOBIN g/dL 8 6* 8 9* 7 9* 9 1* 8 1* 8 2* 7 4* 7 2*   < > 7 4*   < > 7 5*   < > 8 2* 8 0*   HEMATOCRIT % 27 0* 28 7* 25 4* 29 5* 25 7* 26 5* 24 4* 23 9*   < > 24 1*   < > 25 1*   < > 26 8* 26 8*   MCV fL 97  --   --  97  --   --   --  98  --  98  --  98  --  99* 100*   PLATELETS Thousands/uL 175  --   --  138*  --   --   --  157  --  141*  --  152  --  143* 134*   MCH pg 30 8  --   --  30 0  --   --   --  29 6  --  30 2  --  29 4  --  30 1 29 7   MCHC g/dL 31 9  --   --  30 8*  --   --   --  30 1*  --  30 7*  --  29 9*  --  30 6* 29 9*   RDW % 15 5*  --   --  15 9*  --   --   --  15 9*  --  15 9*  --  16 3*  --  16 2* 16 3*   MPV fL 11 7  --   --  12 1  --   --   --  11 4  --  11 2  --  11 1  --  11 7 11 0   NRBC AUTO /100 WBCs 0  --   --  0  --   --   --  0  --  0  --  0  --  0  --     < > = values in this interval not displayed       CMP:  Results from last 7 days   Lab Units 03/06/19  0552 03/05/19  4253 03/04/19 0449 03/03/19  4786 03/02/19  0617 03/01/19  0618 02/28/19  0511   POTASSIUM mmol/L 3 5 4 0 3 4* 3 2* 3 1* 3 2* 3 2*   CHLORIDE mmol/L 101 101 101 102 104 106 110*   CO2 mmol/L 28 24 27 29 27 29 24   BUN mg/dL 23 35* 29* 21 22 14 16   CREATININE mg/dL 3 19* 4 39* 3 57* 2 87* 3 02* 2 19* 2 18*   CALCIUM mg/dL 7 0* 7 1* 6 9* 6 8* 7 0* 7 1* 7 7*   AST U/L  --   --  10  --   --   --   --    ALT U/L  --   --  <6*  --   --   --   --    ALK PHOS U/L  --   --  78  --   --   --   --    EGFR ml/min/1 73sq m 20 14 18 23 22 32 32     BMP:  Results from last 7 days   Lab Units 03/06/19  0552 03/05/19  2324 03/04/19  0449 03/03/19  0624 03/02/19  0617 03/01/19  0618 02/28/19  0511   POTASSIUM mmol/L 3 5 4 0 3 4* 3 2* 3 1* 3 2* 3 2*   CHLORIDE mmol/L 101 101 101 102 104 106 110*   CO2 mmol/L 28 24 27 29 27 29 24   BUN mg/dL 23 35* 29* 21 22 14 16   CREATININE mg/dL 3 19* 4 39* 3 57* 2 87* 3 02* 2 19* 2 18*   CALCIUM mg/dL 7 0* 7 1* 6 9* 6 8* 7 0* 7 1* 7 7*     Lab Results   Component Value Date    NTBNP 25,015 (H) 2019    NTBNP 25,548 (H) 2019     Results from last 7 days   Lab Units 19  0552 19  0638 19  0449 19  0624 19  0617 19  0618 19  0511   MAGNESIUM mg/dL 1 8 1 9 1 9 1 8 1 9 2 0 2 2     Results from last 7 days   Lab Units 19  1715   INR  1 28*     Lipid Profile:   Lab Results   Component Value Date    CHOL 146 2014    CHOL 145 2014     Lab Results   Component Value Date    HDL 44 2019    HDL 41 2018    HDL 52 2017     Lab Results   Component Value Date    LDLCALC 77 2019    LDLCALC 57 2018    LDLCALC 129 (H) 2017     Lab Results   Component Value Date    TRIG 129 2019    TRIG 102 2018    TRIG 71 2017     Cardiac testing:   Results for orders placed during the hospital encounter of 19   Echo complete with contrast if indicated    Narrative [address] [facility] [address] [facility] [address], [facility] [address]  [phone]    Transthoracic Echocardiogram  2D, Doppler, and Color Doppler    Study date:  2019    Patient: Sindy De La Paz  MR number: [id number]  Account number: [de-identified]  : 1960  Age: 60 years  Gender: Male  Status: Inpatient  Location: Bedside  Height: 72 in  Weight: 339 lb  BP: 89/ 54 mmHg    Indications: chest pain    Diagnoses: R07 9 - Chest pain, unspecified    Sonographer:  MADHU Avila  Primary Physician:  Ovidio Dozier DO  Referring Physician:  Genoveva Lozano DO  Group:  [facility]  Interpreting Physician:  Cristine Bañuelos DO    SUMMARY    PROCEDURE INFORMATION:  This was a technically difficult study despite the administration of echo contrast     LEFT VENTRICLE:  Systolic function was markedly reduced  Ejection fraction was estimated to be 30 %  There was severe diffuse hypokinesis with no obvious identifiable regional variations  Wall thickness was moderately increased  Concentric hypertrophy was present  VENTRICULAR SEPTUM:  There was dyssynergic motion  RIGHT VENTRICLE:  The ventricle was mildly dilated  Systolic function was moderately to markedly reduced  LEFT ATRIUM:  The atrium was mildly dilated  RIGHT ATRIUM:  The atrium was mildly dilated  AORTIC VALVE:  A bioprosthesis was present  It was not well visualized  Mean transvalvular gradient 13 mmHg  TRICUSPID VALVE:  There was mild regurgitation  PERICARDIUM:  A small, partially loculated pericardial effusion was identified along the left ventricular free wall  HISTORY: PRIOR HISTORY: Aortic valve prosthesis    PROCEDURE: The procedure was performed at the bedside  This was a routine study  The transthoracic approach was used  The study included complete 2D imaging, complete spectral Doppler, and color Doppler  The heart rate was 80 bpm, at the  start of the study  Intravenous contrast (Definity solution [1 3 ml Definity/8 7ml normal saline solution]) was administered  Intravenous contrast (Definity solution [1 3 ml Definity/8 7ml normal saline solution]) was administered to  opacify the left ventricle and enhance Doppler signals  Echocardiographic views were limited due to low windows and patient on mechanical ventilator  This was a technically difficult study despite the administration of echo contrast     LEFT VENTRICLE: Size was normal  Systolic function was markedly reduced  Ejection fraction was estimated to be 30 %  There was severe diffuse hypokinesis with no obvious identifiable regional variations  Wall thickness was moderately  increased  Concentric hypertrophy was present  DOPPLER: Normal sinus rhythm was absent   The study was not technically sufficient to allow evaluation of LV diastolic function  VENTRICULAR SEPTUM: There was dyssynergic motion  RIGHT VENTRICLE: The ventricle was mildly dilated  Systolic function was moderately to markedly reduced  LEFT ATRIUM: The atrium was mildly dilated  RIGHT ATRIUM: The atrium was mildly dilated  MITRAL VALVE: Not well visualized  DOPPLER: The transmitral velocity was within the normal range  There was no evidence for stenosis  There was no significant regurgitation  AORTIC VALVE: A bioprosthesis was present  It was not well visualized  Mean transvalvular gradient 13 mmHg  DOPPLER: There was no significant regurgitation  TRICUSPID VALVE: The valve structure was normal  There was normal leaflet separation  DOPPLER: The transtricuspid velocity was within the normal range  There was no evidence for stenosis  There was mild regurgitation  The tricuspid jet  envelope definition was inadequate for estimation of RV systolic pressure  PULMONIC VALVE: Leaflets exhibited normal thickness, no calcification, and normal cuspal separation  DOPPLER: The transpulmonic velocity was within the normal range  There was no significant regurgitation  PERICARDIUM: A small, partially loculated pericardial effusion was identified along the left ventricular free wall  The pericardium was normal in appearance  AORTA: The root exhibited normal size  SYSTEM MEASUREMENT TABLES    2D  %FS: 15 83 %  Ao Diam: 3 09 cm  EDV(Teich): 221 88 ml  EF(Teich): 32 54 %  ESV(Teich): 149 69 ml  IVSd: 1 59 cm  LA Diam: 5 18 cm  LVIDd: 6 58 cm  LVIDs: 5 54 cm  LVPWd: 1 43 cm  SV(Teich): 72 19 ml    CW  AV Env  Ti: 233 56 ms  AV VTI: 49 77 cm  AV Vmax: 2 64 m/s  AV Vmax: 2 67 m/s  AV Vmean: 2 13 m/s  AV maxP 95 mmHg  AV maxP 53 mmHg  AV meanP 73 mmHg    PW  LVOT Env  Ti: 215 22 ms  LVOT VTI: 11 41 cm  LVOT Vmax: 0 59 m/s  LVOT Vmax: 0 7 m/s  LVOT Vmean: 0 52 m/s  LVOT maxP 78 mmHg  LVOT maxP mmHg  LVOT meanP 24 mmHg  MV E Deep: 1 01 m/s    [facility] Accredited Echocardiography Laboratory    Prepared and electronically signed by    Quique Thompson DO  Signed 2019 10:14:55       Results for orders placed during the hospital encounter of 19   [facility]    Narrative [address]  [facility], [address] [facility]  [phone]    Transesophageal Echocardiogram  2D, Doppler, and Color Doppler    Study date:  2019    Patient: Tony Rider  MR number: [id number]  Account number: [de-identified]  : 1960  Age: 60 years  Gender: Male  Status: Inpatient  Location: Bedside  Height: 69 in  Weight: 319 lb  BP: 101/ 54 mmHg    Indications: Bacteremia  Diagnoses: R78 81 - Bacteremia    Sonographer:  Nisreen Spence RDCS  Interpreting Physician:  Eduardo Argueta DO  Primary Physician:  Author Seferino DO  Referring Physician:  Debria Hatchet, DO  Group:  [facility]  Cardiology Fellow:  Brian Borja MD    IMPRESSIONS:  There was no echocardiographic evidence for valvular vegetation  SUMMARY    LEFT VENTRICLE:  Systolic function was moderately reduced  Ejection fraction was estimated to be 40 %  There was moderate diffuse hypokinesis  Wall thickness was mildly increased  There was mild concentric hypertrophy  RIGHT VENTRICLE:  The size was normal   Systolic function was mildly reduced  LEFT ATRIUM:  The atrium was mildly dilated  ATRIAL SEPTUM:  There was a small patent foramen ovale with left to right shunt identified by color Doppler  RIGHT ATRIUM:  The atrium was mildly dilated  MITRAL VALVE:  There was trace regurgitation  There was no echocardiographic evidence of vegetation  AORTIC VALVE:  A bioprosthesis was present  It exhibited normal function  There was no echocardiographic evidence of vegetation  TRICUSPID VALVE:  There was mild regurgitation    There was no echocardiographic evidence of vegetation  HISTORY: PRIOR HISTORY: Aortic valve replacement, NSTEMI, Cardiomyopathy, Acute kidney injury  PROCEDURE: The procedure was performed at the bedside  This was a routine study  The risks and alternatives of the procedure were explained to the patient's next of kin and informed consent was obtained  The transesophageal approach was  used  The study included complete 2D imaging, complete spectral Doppler, and color Doppler  The heart rate was 113 bpm, at the start of the study  An adult omniplane probe was inserted by the cardiology fellow under direct supervision of  the attending cardiologist  Intubated with ease  One intubation attempt(s)  There was no blood detected on the probe  Image quality was adequate  There were no complications during the procedure  MEDICATIONS: Sedation administered by  bedside nurse  LEFT VENTRICLE: Size was normal  Systolic function was moderately reduced  Ejection fraction was estimated to be 40 %  There was moderate diffuse hypokinesis  Wall thickness was mildly increased  There was mild concentric hypertrophy  DOPPLER: The study was not technically sufficient to allow evaluation of LV diastolic function  RIGHT VENTRICLE: The size was normal  Systolic function was mildly reduced  Wall thickness was normal     LEFT ATRIUM: The atrium was mildly dilated  No thrombus was identified  APPENDAGE: The appendage was small  No thrombus was identified  DOPPLER: The function was normal (normal emptying velocity)  ATRIAL SEPTUM: There was a small patent foramen ovale with left to right shunt identified by color Doppler  RIGHT ATRIUM: The atrium was mildly dilated  No thrombus was identified  MITRAL VALVE: Valve structure was normal  There was normal leaflet separation  There was no echocardiographic evidence of vegetation  DOPPLER: There was trace regurgitation  AORTIC VALVE: A bioprosthesis was present  It exhibited normal function   There was no echocardiographic evidence of vegetation  TRICUSPID VALVE: The valve structure was normal  There was normal leaflet separation  There was no echocardiographic evidence of vegetation  DOPPLER: There was mild regurgitation  PULMONIC VALVE: Leaflets exhibited normal thickness, no calcification, and normal cuspal separation  There was no echocardiographic evidence of vegetation  DOPPLER: There was no significant regurgitation  PERICARDIUM: There was no pericardial effusion seen in the images obtained  AORTA: The root exhibited normal size  There was no atheroma  There was no evidence for dissection  There was no evidence for aneurysm      [address] Accredited Echocardiography Laboratory    Prepared and electronically signed by    Alli Perez DO  Signed 25-Jan-2019 14:01:14       Meds/Allergies   current meds:   Current Facility-Administered Medications   Medication Dose Route Frequency    acetaminophen (TYLENOL) tablet 650 mg  650 mg Oral Q6H PRN    atorvastatin (LIPITOR) tablet 40 mg  40 mg Oral Daily With Dinner    b complex-vitamin C-folic acid (NEPHROCAPS) capsule 1 capsule  1 capsule Oral Daily With Dinner    bisacodyl (DULCOLAX) rectal suppository 10 mg  10 mg Rectal Daily PRN    ceFAZolin (ANCEF) 1 g in sodium chloride 0 9% 50 ml IVPB  1,000 mg Intravenous Q24H    diltiazem (CARDIZEM) tablet 30 mg  30 mg Oral Q6H [facility]    heparin (porcine) 25,000 units in 250 mL infusion (premix)  3-30 Units/kg/hr (Order-Specific) Intravenous Titrated    heparin (porcine) injection 10,000 Units  10,000 Units Intravenous PRN    heparin (porcine) injection 5,000 Units  5,000 Units Intravenous PRN    hydrocortisone 1 % cream   Topical BID    melatonin tablet 6 mg  6 mg Oral HS    metoprolol (LOPRESSOR) injection 5 mg  5 mg Intravenous Q6H PRN    metoprolol tartrate (LOPRESSOR) tablet 50 mg  50 mg Oral TID    metroNIDAZOLE (FLAGYL) tablet 500 mg  500 mg Oral TID    midodrine (PROAMATINE) tablet 10 mg  10 mg Oral Before Dialysis    nystatin (MYCOSTATIN) powder   Topical BID    omeprazole (PRILOSEC) suspension 2 mg/mL  20 mg Oral BID AC    oxyCODONE (ROXICODONE) IR tablet 5 mg  5 mg Oral Q4H PRN    oxyCODONE (ROXICODONE) IR tablet 7 5 mg  7 5 mg Oral Q4H PRN    polyvinyl alcohol (LIQUIFILM TEARS) 1 4 % ophthalmic solution 1 drop  1 drop Both Eyes Q3H PRN    vancomycin (VANCOCIN) IVPB (premix) 750 mg  10 mg/kg (Adjusted) Intravenous Once per day on Tue Thu Sat     Medications Prior to Admission   Medication    amLODIPine (NORVASC) 5 mg tablet    ascorbic acid (VITAMIN C) 500 MG tablet    aspirin (ECOTRIN) 325 mg EC tablet    fluticasone (FLONASE) 50 mcg/act nasal spray    loratadine (CLARITIN) 10 mg tablet    metoprolol tartrate (LOPRESSOR) 100 mg tablet    potassium chloride (K-DUR,KLOR-CON) 20 mEq tablet    pravastatin (PRAVACHOL) 40 mg tablet    torsemide (DEMADEX) 20 mg tablet       heparin (porcine) 3-30 Units/kg/hr (Order-Specific) Last Rate: 6 Units/kg/hr (03/06/19 0724)     Assessment:  Principal Problem:    MSSA bacteremia - Resolved septic shock  Active Problems:    Essential hypertension    Stress-induced cardiomyopathy    Acute respiratory failure with hypoxia (HCC)    History of aortic valve replacement with bioprosthetic valve    Persistent atrial fibrillation (HCC)    Thrombocytopenia (HCC)    Acute blood loss anemia - Gastric ulcer    Cerebrovascular accident ([facility] [address] )    Acute metabolic encephalopathy    Skin rash    Acute renal failure    Hypovolemic shock (HCC)    GI bleed    Left arm swelling    Deep tissue injury    Dysphagia    Cellulitis/myositis of left forearm    Left internal jugular vein thrombus    Atrial flutter/fibrillation - Stress-induced cardiomyopathy     Morbid obesity     Counseling / Coordination of Care  Total floor / unit time spent today 20 minutes    Greater than 50% of total time was spent with the patient and / or family counseling and / or coordination of care  ** Please Note: Dragon Booster Dictation voice to text software may have been used in the creation of this document   **

## 2019-03-06 NOTE — PROGRESS NOTES
Progress Note - General Surgery   Ginny Bellamy 60 y o  male MRN: [id number]  Unit/Bed#: [facility] 834-01 Encounter: [id number]    Assessment/Plan:  60 y o  male known to the Surgical Service this admission  Recently recalled regarding left upper extremity and concern for NSTI  Patient now with considerable edema and history of nonocclusive DVT secondary to left IJ PermCath  · Follow-up left upper extremity venous duplex  · Serial exams of left upper extremity, neurovascular checks  · Continue heparin gtt  · Care per primary     Subjective/Objective     Subjective:  No acute events overnight  Patient continued to complain of left upper extremity pain  States Can you guys just cut it off  Objective:     Vitals: Blood pressure 125/77, pulse (!) 54, temperature 97 7 °F (36 5 °C), temperature source Oral, resp  rate 20, height 5' 9" (1 753 m), weight (!) 167 kg (368 lb 2 7 oz), SpO2 98 %  ,Body mass index is 54 37 kg/m²  I/O       03/04 1100 - 03/05 0700 03/05 0701 - 03/06 0700 03/06 0701 - 03/07 0700    P  O  410 240     I V  (mL/kg)  984 4 (5 9)     Blood 365      Other  400     IV Piggyback       Total Intake(mL/kg) 775 (4 8) 1624 4 (9 7)     Urine (mL/kg/hr) 0 (0) 0 (0)     Other  3683     Stool       Total Output 0 3683     Net +775 -2058  6            Unmeasured Stool Occurrence  2 x           Physical Exam:  GEN: NAD  HEENT: MMM  CV: RRR  Lung: Normal effort  Ab: Soft, NT/ND  Extrem:  Significant left upper extremity edema with proximal extension into the upper arm  Palpable left radial pulse  Compartments soft/nontender  No crepitus  Neuro: A+Ox3  Left upper extremity motor/sensory intact      Lab, Imaging and other studies:   CBC with diff:   Lab Results   Component Value Date    WBC 12 11 (H) 03/06/2019    HGB 8 6 (L) 03/06/2019    HCT 27 0 (L) 03/06/2019    MCV 97 03/06/2019     03/06/2019    MCH 30 8 03/06/2019    MCHC 31 9 03/06/2019    RDW 15 5 (H) 03/06/2019    MPV 11 7 03/06/2019 NRBC 0 03/06/2019   , BMP/CMP:   Lab Results   Component Value Date    SODIUM 135 (L) 03/06/2019    K 3 5 03/06/2019     03/06/2019    CO2 28 03/06/2019    BUN 23 03/06/2019    CREATININE 3 19 (H) 03/06/2019    CALCIUM 7 0 (L) 03/06/2019    EGFR 20 03/06/2019     VTE Pharmacologic Prophylaxis: Heparin  VTE Mechanical Prophylaxis: sequential compression device

## 2019-03-06 NOTE — OCCUPATIONAL THERAPY NOTE
[address] Progress Note     Patient Name: Santy SWARTZ'S Date: 3/6/2019  Problem List  Patient Active Problem List   Diagnosis    Aortic stenosis, mild    Essential hypertension    Hyperlipidemia    Left bundle branch block    Murmur    Osteoarthritis of both knees    Pericardial disease    Sciatica    Age-related cataract of right eye    Venous insufficiency    Bilateral leg edema    Stress-induced cardiomyopathy    Acute respiratory failure with hypoxia ([facility] [address] )    History of aortic valve replacement with bioprosthetic valve    Persistent atrial fibrillation (HCC)    MSSA bacteremia - Resolved septic shock    Thrombocytopenia (HCC)    Acute blood loss anemia - Gastric ulcer    Leukocytosis    Cerebrovascular accident ([facility] [address] )    Acute metabolic encephalopathy    Skin rash    Acute renal failure    Hypovolemic shock (HCC)    GI bleed    Coagulopathy ([facility])    GI bleeding    Age-related nuclear cataract, bilateral    Open angle with borderline findings, low risk, bilateral    Presence of intraocular lens    Vitreous degeneration of both eyes    Left arm swelling    Deep tissue injury    Dysphagia    Cellulitis/myositis of left forearm    Left internal jugular vein thrombus    Atrial flutter/fibrillation - Stress-induced cardiomyopathy     Morbid obesity          03/06/19 1313   Restrictions/Precautions   Weight Bearing Precautions Per Order Yes   LUE Weight Bearing Per Order Other  (in sling)   Other Precautions Cognitive;Multiple lines;Telemetry;O2;Fall Risk;Pain   General   Response to Previous Treatment Patient with no complaints from previous session   Lifestyle   Autonomy I ADLS, IADLS, transfers and functional mobility PTA   Reciprocal Relationships Pt lives w/ spouse and mother in law   Service to Others Pt works full time as a     Intrinsic Gratification pt likes trains   Pain Assessment   Pain Assessment FLACC   Pain Rating: FLACC (Rest) - Face 0 Pain Rating: FLACC (Rest) - Legs 0   Pain Rating: FLACC (Rest) - Activity 0   Pain Rating: FLACC (Rest) - Cry 0   Pain Rating: FLACC (Rest) - Consolability 0   Score: FLACC (Rest) 0   Pain Rating: FLACC (Activity) - Face 1   Pain Rating: FLACC (Activity) - Legs 0   Pain Rating: FLACC (Activity) - Activity 1   Pain Rating: FLACC (Activity) - Cry 1   Pain Rating: FLACC (Activity) - Consolability 0   Score: FLACC (Activity) 3   ADL   Where Assessed Edge of bed   Grooming Assistance 4  Minimal Assistance   Grooming Deficit Wash/dry hands; Wash/dry face   Bed Mobility   Rolling R 3  Moderate assistance   Additional items Assist x 2   Rolling L 3  Moderate assistance   Additional items Assist x 2   Supine to Sit 2  Maximal assistance   Additional items Assist x 2   Sit to Supine 2  Maximal assistance   Additional items Assist x 2   Additional Comments pt sat EOB for 10 minutes while participating in therapeutic exercise   ROM- Right Upper Extremities   R Shoulder AROM   R Elbow AROM   R Wrist AROM   R Hand AROM   Cognition   Overall Cognitive Status Impaired   Arousal/Participation Lethargic   Attention Attends with cues to redirect   Orientation Level Oriented to person;Oriented to place; Disoriented to time;Disoriented to situation   Memory Decreased recall of precautions;Decreased recall of recent events;Decreased short term memory   Following Commands Follows one step commands with increased time or repetition   Activity Tolerance   Activity Tolerance Patient limited by fatigue   Medical Staff Made Aware PT Jaden and melany Cortes   Assessment   Assessment Patient participated in Skilled OT session this date with interventions consisting of ADL re training with the use of correct body mechnaics, safety awareness and fall prevention techniques, therapeutic exercise to: increase functional use of BUEs, increase BUE muscle strength ,  therapeutic activities to: increase activity tolerance, increase postural control, increase trunk control and increase OOB/ sitting tolerance   Patient agreeable to OT treatment session, upon arrival patient was found supine in bed  In comparison to previous session, patient with improvements in sitting EOB tolerance*   Patient requiring verbal cues for safety, verbal cues for correct technique and verbal cues for pacing thru activity steps  Patient continues to be functioning below baseline level, occupational performance remains limited secondary to factors listed above and increased risk for falls and injury  From OT standpoint, recommendation at time of d/c would be Short Term Rehab  Patient to benefit from continued Occupational Therapy treatment while in the hospital to address deficits as defined above and maximize level of functional independence with ADLs and functional mobility  Plan   Treatment Interventions ADL retraining;Functional transfer training; Endurance training;UE strengthening/ROM   Goal Expiration Date 03/13/19   Treatment Day 2   OT Frequency 3-5x/wk   Recommendation   OT Discharge Recommendation Short Term Rehab   OT - OK to Discharge Yes   Barthel Index   Feeding 5   Bathing 0   Grooming Score 0   Dressing Score 5   Bladder Score 5   Bowels Score 5   Toilet Use Score 0   Transfers (Bed/Chair) Score 5   Mobility (Level Surface) Score 0   Stairs Score 0   Barthel Index Score 25   Modified [place] Scale   Modified [place] Scale 5     Melyssa Rasheed MS, OTR/L

## 2019-03-06 NOTE — PROGRESS NOTES
Progress Note - Infectious Disease   Wes Stallings 60 y o  male MRN: [id number]  Unit/Bed#: [facility] 834-01 Encounter: [id number]      Impression/Plan:  1  MSSA bacteremia:  Possibility of endocarditis considered in the setting of bioprosthetic AVR  Jacki Vears no evidence of valvular vegetations   Initial portal of entry may have been related to multiple upper back wounds   CT chest/abdomen/pelvis with no other evidence of acute infection   Possible from pneumonia as sputum culture was positive for MSSA   Bacteremia eventually cleared   Podiatry evaluated patient's feet and no acute issues   Neurology evaluation noted with prior changes in mental status, imaging abnormalities felt to be ischemic and similar compared to prior studies   No plan for MRI   Patient improved significantly after recent GI bleed       -antibiotic changed to vancomycin as below  -prolonged course of IV antibiotics of at least 6 weeks through 3/10 for this issue  -monitor temperature/WBC  -send blood cultures if patient spikes fever     2  Rash and peripheral eosinophilia:  Patient recently developed rash  It was noted to be flat and pruritic  Absolute eosinophil count elevated   Likely due to amiodarone as the patient tolerated rechallenge with Ancef and there was no difference in the rash when patient was switched to vancomycin empirically  His absolute eosinophilia slowly down trending while remaining on Ancef  I suspect that this will be a prolonged elevation given the half-life of amiodarone  Patient has faint erythema on his skin today       -monitor WBC  -continue to trend fever curve/vitals  -antibiotics changes as below      3  Left forearm myositis/cellulitis:  Patient was noted to have large ecchymosis his left forearm after transition from the ICU  This was thought to be due to issues with obtaining access during his acute decline    The ecchymosis has largely resolved however the patient's arm remains swollen more so than his other extremities and painful  CT imaging of the arm was done and findings are notable for soft tissue edema and suggestion of myositis/cellulitis  No obvious collections seen though study limited without contrast   Vascular study with superficial thrombophlebitis in the arm  The patient fortunately remains hemodynamically stable with persistent leukocytosis  Repeat CT scan reviewed and surgical evaluation appreciated  -will narrow the patient to vancomycin  -pharmacy following for vancomycin monitoring  -will stop Ancef and Flagyl  -ongoing follow-up by surgery  -continue to monitor CBC  -continue to trend fever curve/vitals  -serial exams  -ongoing supportive care as per primary  -will likely treat for total of 14 days for possible myositis      4  History of bioprosthetic AVR   Secondary to degenerative AS   Placed in July 2014  Mily Winkler no evidence of endocarditis   Ongoing follow-up by Cardiology      5  Acute kidney injury   Likely ischemic/septic ATN    Patient remains on hemodialysis   He is status post PermCath placement      -ongoing follow-up by Nephrology  -antibiotics re-dose for intermittent HD      6  Leukocytosis:  WBC  elevated   Patient continues to improve clinically  This may be due now to problem 3 and problem 2 may be contributing      -continue to monitor fever curve/vitals  -continue monitor CBC  -continue antibiotics as above     Above plan discussed in detail with patient and primary service      ID consult service will continue to follow  Antibiotics:  Ancef/vancomycin/Flagyl    24 hour events:  No acute events noted overnight on chart review  Patient's white blood cell count 12 1  Currently afebrile  CT of the forearm noted  Surgical evaluation noted  Patient's other vitals are stable    Subjective:  Patient currently denies having any nausea, vomiting, chest pain or shortness of breath  He continues to have pain in his left upper extremity    He is also noted to start scratching at his abdomen again  He can't tell me his name and that he has been here for a long time but does not recall a lot of the details of his admission  Objective:  Vitals:  Temp:  [97 7 °F (36 5 °C)-98 7 °F (37 1 °C)] 97 7 °F (36 5 °C)  HR:  [] 54  Resp:  [16-24] 20  BP: ()/(36-79) 125/77  SpO2:  [92 %-97 %] 94 %  Temp (24hrs), Av 2 °F (36 8 °C), Min:97 7 °F (36 5 °C), Max:98 7 °F (37 1 °C)  Current: Temperature: 97 7 °F (36 5 °C)    Physical Exam:   General Appearance:  Alert, interactive, nontoxic, no acute distress  Pleasantly confused   Throat: Oropharynx moist without lesions  Lungs:   Decreased breath sounds throughout all lung fields; no wheezes, rhonchi or rales; respirations unlabored   Heart:  RRR; no murmur, rub or gallop appreciated   Abdomen:   Soft, non-tender, non-distended, positive bowel sounds  Extremities: No clubbing, cyanosis; ongoing edema in all of his extremities  Skin: No new rashes or lesions  No new draining wounds noted  Patient noted to have some faint erythema along his abdomen and chest wall  His left upper extremity is without any ecchymoses or erythema but the arm is distended and warm and tender to palpation  Labs, Imaging, & Other studies:   All pertinent labs and imaging studies were personally reviewed  Results from last 7 days   Lab Units 19  0553 19  1923 19  1117 19  0638  19  0449   WBC Thousand/uL 12 11*  --   --  10 87*  --  13 09*   HEMOGLOBIN g/dL 8 6* 8 9* 7 9* 9 1*   < > 7 2*   PLATELETS Thousands/uL 175  --   --  138*  --  157    < > = values in this interval not displayed       Results from last 7 days   Lab Units 19  0552  19  0449   POTASSIUM mmol/L 3 5   < > 3 4*   CHLORIDE mmol/L 101   < > 101   CO2 mmol/L 28   < > 27   BUN mg/dL 23   < > 29*   CREATININE mg/dL 3 19*   < > 3 57*   EGFR ml/min/1 73sq m 20   < > 18   CALCIUM mg/dL 7 0*   < > 6 9*   AST U/L  --   --  10   ALT U/L --   --  <6*   ALK PHOS U/L  --   --  78    < > = values in this interval not displayed  Results from last 7 days   Lab Units 03/01/19  1441   C DIFF TOXIN B  NEGATIVE for C difficle toxin by PCR

## 2019-03-06 NOTE — OCCUPATIONAL THERAPY NOTE
Occupational Therapy Cancellation        Patient Name: Lisa Tang  LXTBP'M Date: 3/6/2019      Chart reviewed  Attempted to see pt  Pt off the floor at dialysis  OT will continue to follow to see as able      Evens Espinal MS, OTR/L

## 2019-03-06 NOTE — PROGRESS NOTES
Patient's PTT 58 with an order to keep strictly between 60-80  As per Dr Calixto Vogel verbal order, will NOT give the patient a PRN bolus as the parameters say   but we will only increase the drip by 2 units/kg/hr to 8 units/kg/hr  No further orders at this time  Will continue to monitor

## 2019-03-06 NOTE — PLAN OF CARE
Problem: Potential for Falls  Goal: Patient will remain free of falls  Description  INTERVENTIONS:  - Assess patient frequently for physical needs  -  Identify cognitive and physical deficits and behaviors that affect risk of falls  -  [place] fall precautions as indicated by assessment   - Educate patient/family on patient safety including physical limitations  - Instruct patient to call for assistance with activity based on assessment  - Modify environment to reduce risk of injury  - Consider OT/PT consult to assist with strengthening/mobility    Outcome: Progressing     Problem: Prexisting or High Potential for Compromised Skin Integrity  Goal: Skin integrity is maintained or improved  Description  INTERVENTIONS:  - Identify patients at risk for skin breakdown  - Assess and monitor skin integrity  - Assess and monitor nutrition and hydration status  - Monitor labs (i e  albumin)  - Assess for incontinence   - Turn and reposition patient  - Assist with mobility/ambulation  - Relieve pressure over bony prominences  - Avoid friction and shearing  - Provide appropriate hygiene as needed including keeping skin clean and dry  - Evaluate need for skin moisturizer/barrier cream  - Collaborate with interdisciplinary team (i e  Nutrition, Rehabilitation, etc )   - Patient/family teaching   Outcome: Progressing     Problem: Nutrition/Hydration-ADULT  Goal: Nutrient/Hydration intake appropriate for improving, restoring or maintaining nutritional needs  Description  Monitor and assess patient's nutrition/hydration status for malnutrition (ex- brittle hair, bruises, dry skin, pale skin and conjunctiva, muscle wasting, smooth red tongue, and disorientation)  Collaborate with interdisciplinary team and initiate plan and interventions as ordered  Monitor patient's weight and dietary intake as ordered or per policy  Utilize nutrition screening tool and intervene per policy   Determine patient's food preferences and provide high-protein, high-caloric foods as appropriate  INTERVENTIONS:  - Monitor oral intake, urinary output, labs, and treatment plans  - Assess nutrition and hydration status and recommend course of action  - Evaluate amount of meals eaten  - Assist patient with eating if necessary   - Allow adequate time for meals  - Recommend/ encourage appropriate diets, oral nutritional supplements, and vitamin/mineral supplements  - Order, calculate, and assess calorie counts as needed  - Recommend, monitor, and adjust tube feedings and TPN/PPN based on assessed needs  - Assess need for intravenous fluids  - Provide specific nutrition/hydration education as appropriate  - Include patient/family/caregiver in decisions related to nutrition   Outcome: Progressing     Problem: CARDIOVASCULAR - ADULT  Goal: Maintains optimal cardiac output and hemodynamic stability  Description  INTERVENTIONS:  - Monitor I/O, vital signs and rhythm  - Monitor for S/S and trends of decreased cardiac output i e  bleeding, hypotension  - Administer and titrate ordered vasoactive medications to optimize hemodynamic stability  - Assess quality of pulses, skin color and temperature  - Assess for signs of decreased coronary artery perfusion - ex   Angina  - Instruct patient to report change in severity of symptoms   Outcome: Progressing  Goal: Absence of cardiac dysrhythmias or at baseline rhythm  Description  INTERVENTIONS:  - Continuous cardiac monitoring, monitor vital signs, obtain 12 lead EKG if indicated  - Administer antiarrhythmic and heart rate control medications as ordered  - Monitor electrolytes and administer replacement therapy as ordered   Outcome: Progressing     Problem: METABOLIC, FLUID AND ELECTROLYTES - ADULT  Goal: Electrolytes maintained within normal limits  Description  INTERVENTIONS:  - Monitor labs and assess patient for signs and symptoms of electrolyte imbalances  - Administer electrolyte replacement as ordered  - Monitor response to electrolyte replacements, including repeat lab results as appropriate  - Instruct patient on fluid and nutrition as appropriate   Outcome: Progressing  Goal: Fluid balance maintained  Description  INTERVENTIONS:  - Monitor labs and assess for signs and symptoms of volume excess or deficit  - Monitor I/O and WT  - Instruct patient on fluid and nutrition as appropriate   Outcome: Progressing     Problem: PAIN - ADULT  Goal: Verbalizes/displays adequate comfort level or baseline comfort level  Description  Interventions:  - Encourage patient to monitor pain and request assistance  - Assess pain using appropriate pain scale  - Administer analgesics based on type and severity of pain and evaluate response  - Implement non-pharmacological measures as appropriate and evaluate response  - Consider cultural and social influences on pain and pain management  - Notify physician/advanced practitioner if interventions unsuccessful or patient reports new pain   Outcome: Progressing     Problem: INFECTION - ADULT  Goal: Absence or prevention of progression during hospitalization  Description  INTERVENTIONS:  - Assess and monitor for signs and symptoms of infection  - Monitor lab/diagnostic results  - Monitor all insertion sites, i e  indwelling lines, tubes, and drains  - Monitor endotracheal (as able) and nasal secretions for changes in amount and color  - [place] appropriate cooling/warming therapies per order  - Administer medications as ordered  - Instruct and encourage patient and family to use good hand hygiene technique  - Identify and instruct in appropriate isolation precautions for identified infection/condition    Outcome: Progressing     Problem: SAFETY ADULT  Goal: Patient will remain free of falls  Description  INTERVENTIONS:  - Assess patient frequently for physical needs  -  Identify cognitive and physical deficits and behaviors that affect risk of falls    -  [place] fall precautions as indicated by assessment   - Educate patient/family on patient safety including physical limitations  - Instruct patient to call for assistance with activity based on assessment  - Modify environment to reduce risk of injury  - Consider OT/PT consult to assist with strengthening/mobility    Outcome: Progressing  Goal: Maintain or return to baseline ADL function  Description  INTERVENTIONS:  -  Assess patient's ability to carry out ADLs; assess patient's baseline for ADL function and identify physical deficits which impact ability to perform ADLs (bathing, care of mouth/teeth, toileting, grooming, dressing, etc )  - Assess/evaluate cause of self-care deficits   - Assess range of motion  - Assess patient's mobility; develop plan if impaired  - Assess patient's need for assistive devices and provide as appropriate  - Encourage maximum independence but intervene and supervise when necessary  ¯ Involve family in performance of ADLs  ¯ Assess for home care needs following discharge   ¯ Request OT consult to assist with ADL evaluation and planning for discharge  ¯ Provide patient education as appropriate    Outcome: Progressing  Goal: Maintain or return mobility status to optimal level  Description  INTERVENTIONS:  - Assess patient's baseline mobility status (ambulation, transfers, stairs, etc )    - Identify cognitive and physical deficits and behaviors that affect mobility  - Identify mobility aids required to assist with transfers and/or ambulation (gait belt, sit-to-stand, lift, walker, cane, etc )  - [place] fall precautions as indicated by assessment  - Record patient progress and toleration of activity level on Mobility SBAR; progress patient to next Phase/Stage  - Instruct patient to call for assistance with activity based on assessment  - Request Rehabilitation consult to assist with strengthening/weightbearing, etc     Outcome: Progressing     Problem: DISCHARGE PLANNING  Goal: Discharge to home or other facility with appropriate resources  Description  INTERVENTIONS:  - Identify barriers to discharge w/patient and caregiver  - Arrange for needed discharge resources and transportation as appropriate  - Identify discharge learning needs (meds, wound care, etc )  - Arrange for interpretive services to assist at discharge as needed  - Refer to Case Management Department for coordinating discharge planning if the patient needs post-hospital services based on physician/advanced practitioner order or complex needs related to functional status, cognitive ability, or social support system   Outcome: Progressing     Problem: Knowledge Deficit  Goal: Patient/family/caregiver demonstrates understanding of disease process, treatment plan, medications, and discharge instructions  Description  Complete learning assessment and assess knowledge base    Interventions:  - Provide teaching at level of understanding  - Provide teaching via preferred learning methods   Outcome: Progressing     Problem: GENITOURINARY - ADULT  Goal: Maintains or returns to baseline urinary function  Description  INTERVENTIONS:  - Assess urinary function  - Encourage oral fluids to ensure adequate hydration  - Administer IV fluids as ordered to ensure adequate hydration  - Administer ordered medications as needed  - Offer frequent toileting  - Follow urinary retention protocol if ordered   Outcome: Progressing  Goal: Absence of urinary retention  Description  INTERVENTIONS:  - Assess patient?s ability to void and empty bladder  - Monitor I/O  - Bladder scan as needed  - Discuss with physician/AP medications to alleviate retention as needed  - Discuss catheterization for long term situations as appropriate   Outcome: Progressing     Problem: DISCHARGE PLANNING - CARE MANAGEMENT  Goal: Discharge to post-acute care or home with appropriate resources  Description  INTERVENTIONS:  - Conduct assessment to determine patient/family and health care team treatment goals, and need for post-acute services based on payer coverage, community resources, and patient preferences, and barriers to discharge  - Address psychosocial, clinical, and financial barriers to discharge as identified in assessment in conjunction with the patient/family and health care team  - Arrange appropriate level of post-acute services according to patient's   needs and preference and payer coverage in collaboration with the physician and health care team  - Communicate with and update the patient/family, physician, and health care team regarding progress on the discharge plan  - Arrange appropriate transportation to post-acute venues  - Pt to d/c with appropriate resources when medically stable     Outcome: Progressing

## 2019-03-06 NOTE — PHYSICAL THERAPY NOTE
PHYSICAL THERAPY NOTE          Patient Name: Lisa Tang  [id number] Date: 3/6/2019     03/06/19 1520   Pain Assessment   Pain Assessment FLACC   Pain Rating: FLACC (Rest) - Face 0   Pain Rating: FLACC (Rest) - Legs 0   Pain Rating: FLACC (Rest) - Activity 0   Pain Rating: FLACC (Rest) - Cry 0   Pain Rating: FLACC (Rest) - Consolability 0   Score: FLACC (Rest) 0   Pain Rating: FLACC (Activity) - Face 1   Pain Rating: FLACC (Activity) - Legs 0   Pain Rating: FLACC (Activity) - Activity 1   Pain Rating: FLACC (Activity) - Cry 1   Pain Rating: FLACC (Activity) - Consolability 0   Score: FLACC (Activity) 3   Restrictions/Precautions   Weight Bearing Precautions Per Order Yes   LUE Weight Bearing Per Order Other  (in sling )   Other Precautions Cognitive;Multiple lines;Telemetry;O2;Fall Risk;Pain   General   Chart Reviewed Yes   Response to Previous Treatment Patient with no complaints from previous session  Family/Caregiver Present No   Cognition   Overall Cognitive Status Impaired   Arousal/Participation Lethargic   Attention Attends with cues to redirect   Orientation Level Oriented to person;Oriented to place   Memory Decreased recall of precautions;Decreased recall of recent events   Subjective   Subjective pt willing to participate in PT treatment session    Bed Mobility   Rolling R 3  Moderate assistance   Additional items Assist x 2; Increased time required;Verbal cues   Rolling L 3  Moderate assistance   Additional items Assist x 2; Increased time required;Verbal cues   Supine to Sit 2  Maximal assistance   Additional items Assist x 2; Increased time required;Verbal cues   Sit to Supine 2  Maximal assistance   Additional items Assist x 2; Increased time required;Verbal cues   Additional Comments Pt able to tolerate sitting EOB x 1 0min with mod A x 1 needed to maintain seated balance    Transfers   Additional Comments NA, Pt with decreasd sitting balance and tolerance at current time  Not appropriate for OOB mobility at current time    Ambulation/Elevation   Gait pattern Not appropriate   Balance   Static Sitting Poor   Dynamic Sitting Poor   Endurance Deficit   Endurance Deficit Yes   Endurance Deficit Description fatigue and pain    Activity Tolerance   Activity Tolerance Patient limited by fatigue;Patient limited by pain   Nurse Made Aware Pt appropriate to be seen and mobilize per nsg    Medical Staff Todd Valerio 20, OT persent    Exercises   Hip Abduction Sitting;10 reps;AAROM; Bilateral  (x 2 sets )   Knee AROM Long Arc Quad Sitting;10 reps;AAROM; Bilateral  (x 2 sets )   Ankle Pumps Sitting;10 reps;AAROM; Bilateral  (x 2 sets )   Marching Sitting;10 reps;AAROM; Bilateral  (x 2 sets )   Assessment   Prognosis Fair   Problem List Decreased strength;Decreased range of motion;Decreased endurance; Impaired balance;Decreased mobility; Decreased cognition; Impaired judgement;Decreased safety awareness; Obesity;Pain   Assessment Pt resting in bed at time of PT treatment session  Pt initially lethargic, however was willing to participate in PT treatment session  With increased stimulation in room pt became more awake and alert  Pt able to perform al rolling in bed with mod A x 2 and supine to sit bed mobility with max A x 2 which is improved compared to previous session  VC and TC required for hand placement and safety during bed mobility  Pt able to sit EOB this session with poor seated balance noted  Pt required VC and TC as well as mod A x 1 to maintain upright seated posture  Pt noted to have posterior and L lateral lean in sitting  Pt live to tolerate and perform all therex seated EOB without any increase in complaints  VC and TC needed for proper form and pacing as well as physical assistance to perform therex through full available ROM  Cues also required to redirect pts attention throughout treatment session  Pt assisted back to bed at conclusion of PT session with all needs within reach  Pt denies any further questions at this time  PT will continue to follow  D/C recommendation when medically cleared is rehab due to decreased functional mobility compared to baseline and increased A needed from caregiver at current time  Barriers to Discharge Inaccessible home environment   Goals   Patient Goals " to sit up"   RUST Expiration Date 03/09/19   Treatment Day 2   Plan   Treatment/Interventions LE strengthening/ROM; Therapeutic exercise; Endurance training;Patient/family training;Equipment eval/education; Bed mobility;Continued evaluation;Spoke to nursing;OT   Progress Slow progress, decreased activity tolerance   PT Frequency Other (Comment)  (3-5x a week )   Recommendation   Recommendation Short-term skilled PT   Equipment Recommended   (continue to assess )   PT - OK to Discharge Yes  (to rehab when medically cleared )   Andrew Patel, PT

## 2019-03-06 NOTE — SOCIAL WORK
CM called pt wife Sasha Hinds and updated , cm provided all contact information   Sasha Hinds awere cm updating hometown and that pt has a chair time at [address] [place] [address] 10:45 am   Cm will follow

## 2019-03-06 NOTE — PROGRESS NOTES
Vancomycin IV Pharmacy-to-Dose Consultation    Abel Almeida is a 60 y o  male who is currently receiving Vancomycin IV with management by the Pharmacy Consult service  Assessment/Plan:  The patient was reviewed  Patient is on HD on Tues, Thurs, Sat and no signs or symptoms of nephrotoxicity and/or infusion reactions were documented in the chart  Based on today 's assessment, continue current vancomycin (day #5) dosing of 750mg (10mg/kg using adjusted wt due to obesity ) pot hemo, with a plan of trough to be drawn at 0600(with morning labs) on 3/9  We will continue to follow the patient?s culture results and clinical progress daily      Miladis Robertson, Pharmacist

## 2019-03-07 ENCOUNTER — APPOINTMENT (INPATIENT)
Dept: RADIOLOGY | Facility: HOSPITAL | Age: 59
DRG: 871 | End: 2019-03-07
Payer: COMMERCIAL

## 2019-03-07 ENCOUNTER — APPOINTMENT (INPATIENT)
Dept: DIALYSIS | Facility: HOSPITAL | Age: 59
DRG: 871 | End: 2019-03-07
Attending: INTERNAL MEDICINE
Payer: COMMERCIAL

## 2019-03-07 LAB
ANION GAP SERPL CALCULATED.3IONS-SCNC: 8 MMOL/L (ref 4–13)
APTT PPP: 38 SECONDS (ref 26–38)
APTT PPP: 86 SECONDS (ref 26–38)
APTT PPP: >210 SECONDS (ref 26–38)
BUN SERPL-MCNC: 31 MG/DL (ref 5–25)
CALCIUM SERPL-MCNC: 7.2 MG/DL (ref 8.3–10.1)
CHLORIDE SERPL-SCNC: 101 MMOL/L (ref 100–108)
CO2 SERPL-SCNC: 26 MMOL/L (ref 21–32)
CREAT SERPL-MCNC: 3.91 MG/DL (ref 0.6–1.3)
GFR SERPL CREATININE-BSD FRML MDRD: 16 ML/MIN/1.73SQ M
GLUCOSE SERPL-MCNC: 78 MG/DL (ref 65–140)
HCT VFR BLD AUTO: 26.9 % (ref 36.5–49.3)
HCT VFR BLD AUTO: 29.6 % (ref 36.5–49.3)
HGB BLD-MCNC: 8.3 G/DL (ref 12–17)
HGB BLD-MCNC: 9.1 G/DL (ref 12–17)
MAGNESIUM SERPL-MCNC: 1.8 MG/DL (ref 1.6–2.6)
POTASSIUM SERPL-SCNC: 3.7 MMOL/L (ref 3.5–5.3)
SODIUM SERPL-SCNC: 135 MMOL/L (ref 136–145)

## 2019-03-07 PROCEDURE — 73560 X-RAY EXAM OF KNEE 1 OR 2: CPT

## 2019-03-07 PROCEDURE — 99232 SBSQ HOSP IP/OBS MODERATE 35: CPT | Performed by: INTERNAL MEDICINE

## 2019-03-07 PROCEDURE — 80048 BASIC METABOLIC PNL TOTAL CA: CPT | Performed by: INTERNAL MEDICINE

## 2019-03-07 PROCEDURE — 97530 THERAPEUTIC ACTIVITIES: CPT

## 2019-03-07 PROCEDURE — 83735 ASSAY OF MAGNESIUM: CPT | Performed by: INTERNAL MEDICINE

## 2019-03-07 PROCEDURE — 85730 THROMBOPLASTIN TIME PARTIAL: CPT | Performed by: INTERNAL MEDICINE

## 2019-03-07 PROCEDURE — 85018 HEMOGLOBIN: CPT | Performed by: PHYSICIAN ASSISTANT

## 2019-03-07 PROCEDURE — 92526 ORAL FUNCTION THERAPY: CPT

## 2019-03-07 PROCEDURE — 90935 HEMODIALYSIS ONE EVALUATION: CPT | Performed by: INTERNAL MEDICINE

## 2019-03-07 PROCEDURE — 94762 N-INVAS EAR/PLS OXIMTRY CONT: CPT

## 2019-03-07 PROCEDURE — 85014 HEMATOCRIT: CPT | Performed by: PHYSICIAN ASSISTANT

## 2019-03-07 RX ORDER — DILTIAZEM HYDROCHLORIDE 120 MG/1
120 CAPSULE, COATED, EXTENDED RELEASE ORAL DAILY
Status: DISCONTINUED | OUTPATIENT
Start: 2019-03-07 | End: 2019-03-10

## 2019-03-07 RX ORDER — DIPHENHYDRAMINE HYDROCHLORIDE 50 MG/ML
12.5 INJECTION INTRAMUSCULAR; INTRAVENOUS ONCE
Status: COMPLETED | OUTPATIENT
Start: 2019-03-07 | End: 2019-03-07

## 2019-03-07 RX ORDER — VANCOMYCIN HYDROCHLORIDE 1 G/200ML
10 INJECTION, SOLUTION INTRAVENOUS 3 TIMES WEEKLY
Status: DISCONTINUED | OUTPATIENT
Start: 2019-03-07 | End: 2019-03-08

## 2019-03-07 RX ADMIN — DIPHENHYDRAMINE HYDROCHLORIDE 12.5 MG: 50 INJECTION, SOLUTION INTRAMUSCULAR; INTRAVENOUS at 10:12

## 2019-03-07 RX ADMIN — VANCOMYCIN HYDROCHLORIDE 750 MG: 750 INJECTION, SOLUTION INTRAVENOUS at 11:41

## 2019-03-07 RX ADMIN — DILTIAZEM HYDROCHLORIDE 120 MG: 120 CAPSULE, COATED, EXTENDED RELEASE ORAL at 17:43

## 2019-03-07 RX ADMIN — MELATONIN 6 MG: at 22:34

## 2019-03-07 RX ADMIN — DILTIAZEM HYDROCHLORIDE 30 MG: 30 TABLET, FILM COATED ORAL at 10:52

## 2019-03-07 RX ADMIN — Medication 1 CAPSULE: at 17:43

## 2019-03-07 RX ADMIN — HEPARIN SODIUM 10000 UNITS: 1000 INJECTION INTRAVENOUS; SUBCUTANEOUS at 15:44

## 2019-03-07 RX ADMIN — METOPROLOL TARTRATE 50 MG: 50 TABLET, FILM COATED ORAL at 17:43

## 2019-03-07 RX ADMIN — MIDODRINE HYDROCHLORIDE 10 MG: 5 TABLET ORAL at 09:04

## 2019-03-07 RX ADMIN — HEPARIN SODIUM 12 UNITS/KG/HR: 10000 INJECTION, SOLUTION INTRAVENOUS at 18:33

## 2019-03-07 RX ADMIN — ATORVASTATIN CALCIUM 40 MG: 40 TABLET, FILM COATED ORAL at 17:43

## 2019-03-07 RX ADMIN — OXYCODONE HYDROCHLORIDE 7.5 MG: 5 TABLET ORAL at 20:20

## 2019-03-07 RX ADMIN — HEPARIN SODIUM 10 UNITS/KG/HR: 10000 INJECTION, SOLUTION INTRAVENOUS at 00:52

## 2019-03-07 RX ADMIN — METOPROLOL TARTRATE 50 MG: 50 TABLET, FILM COATED ORAL at 22:33

## 2019-03-07 RX ADMIN — OXYCODONE HYDROCHLORIDE 7.5 MG: 5 TABLET ORAL at 10:17

## 2019-03-07 NOTE — PLAN OF CARE
Problem: OCCUPATIONAL THERAPY ADULT  Goal: Performs self-care activities at highest level of function for planned discharge setting  See evaluation for individualized goals  Description  Treatment Interventions: ADL retraining, Functional transfer training, UE strengthening/ROM, Endurance training, Cognitive reorientation, Patient/family training, Equipment evaluation/education, Fine motor coordination activities, Compensatory technique education, Continued evaluation, Energy conservation, Activityengagement          See flowsheet documentation for full assessment, interventions and recommendations  Outcome: Progressing  Note:   Limitation: Decreased ADL status, Decreased Safe judgement during ADL, Decreased cognition, Decreased endurance, Decreased fine motor control, Decreased self-care trans, Decreased high-level ADLs, Decreased UE ROM, Decreased UE strength  Prognosis: Fair  Assessment: Patient participated in Skilled OT session this date with interventions consisting of ADL re-training, bed mobility  Patient agreeable to OT treatment session, upon arrival patient was found supine in bed  Pt performed supine to sit w/ MOD A x2  Pt initially able to maintain sitting balance EOB with MIN-MOD A x1 during self-feeding task  Pt began sliding forward while seated EOB w/ writer and speech therapist Domenica Wong [facility] tech and PCA entered room as sliding began and assisted in lowering pt to floor  Pt assisted back to bed via [facility]  Nursing notified physician  Event report completed  Patient continues to be functioning below baseline level, occupational performance remains limited secondary to factors listed above and increased risk for falls and injury  From OT standpoint, recommendation at time of d/c would be Short Term Rehab     Patient to benefit from continued Occupational Therapy treatment while in the hospital to address deficits as defined above and maximize level of functional independence with ADLs and functional mobility        OT Discharge Recommendation: Short Term Rehab  OT - OK to Discharge: Yes(when medically appropriate)

## 2019-03-07 NOTE — PROGRESS NOTES
Hand off report given RN [facility]  Dr Brandon Wagoner called for clarification of Heparin Bolus due to confusion at shift change report  Pending call back for Heparin clarification

## 2019-03-07 NOTE — PHYSICAL THERAPY NOTE
Physical Therapy Cancellation Note      PT session cancelled as pt  Off the floor for dialysis  Will continue to follow as able

## 2019-03-07 NOTE — PROGRESS NOTES
Progress Note - Infectious Disease   Ginny Bellamy 60 y o  male MRN: [id number]  Unit/Bed#: [facility] 834-01 Encounter: [id number]      Impression/Plan:  1  MSSA bacteremia:  Possibility of endocarditis considered in the setting of bioprosthetic AVR  Ruben Mendez no evidence of valvular vegetations   Initial portal of entry may have been related to multiple upper back wounds   CT chest/abdomen/pelvis with no other evidence of acute infection   Possible from pneumonia as sputum culture was positive for MSSA   Bacteremia eventually cleared   Podiatry evaluated patient's feet and no acute issues   Neurology evaluation noted with prior changes in mental status, imaging abnormalities felt to be ischemic and similar compared to prior studies   No plan for MRI   Patient improved significantly after recent GI bleed       -continue vancomycin as below  -prolonged course of IV antibiotics of at least 6 weeks through 3/10 for this issue  -monitor temperature/WBC  -send blood cultures if patient spikes fever     2  Rash and peripheral eosinophilia:  Patient recently developed rash  It was noted to be flat and pruritic  Absolute eosinophil count elevated   Likely due to amiodarone as the patient tolerated rechallenge with Ancef and there was no difference in the rash when patient was switched to vancomycin empirically   His absolute eosinophilia slowly down trending while remaining on Ancef   I suspect that this will be a prolonged elevation given the half-life of amiodarone     patient again noted with rash and itching today    Antibiotic therapy changed only vancomycin as below       -monitor WBC  -continue to trend fever curve/vitals  -antibiotics changes as below      3  Left forearm myositis/cellulitis:  Patient was noted to have large ecchymosis his left forearm after transition from the ICU   This was thought to be due to issues with obtaining access during his acute decline   The ecchymosis has largely resolved however the patient's arm remains swollen more so than his other extremities and painful   CT imaging of the arm was done and findings are notable for soft tissue edema and suggestion of myositis/cellulitis   No obvious collections seen though study limited without contrast   Vascular study with superficial thrombophlebitis in the arm   The patient fortunately remains hemodynamically stable with persistent leukocytosis  Repeat CT scan reviewed and surgical evaluation appreciated  Duplex study without acute change  The arm itself appears softer and less painful on exam      -continue vancomycin  -pharmacy following for vancomycin monitoring  -ongoing follow-up by surgery  -continue to monitor CBC  -continue to trend fever curve/vitals  -serial exams  -ongoing supportive care as per primary  -will likely treat for total of 14 days for possible myositis through 3/16  -antibiotics dosed with dialysis     4  History of bioprosthetic AVR   Secondary to degenerative AS   Placed in July 2014  Justina Maki no evidence of endocarditis   Ongoing follow-up by Cardiology      5  Acute kidney injury   Likely ischemic/septic ATN    Patient remains on hemodialysis   He is status post PermCath placement      -ongoing follow-up by Nephrology  -antibiotics re-dose for intermittent HD      6  Leukocytosis:  WBC  elevated   Patient continues to improve clinically   This may be due now to problem 3 and problem 2 may be contributing      -continue to monitor fever curve/vitals  -continue monitor CBC  -continue antibiotics as above     Above plan discussed in detail with patient      ID consult service will continue to follow while the patient is admitted  Antibiotics:  Vancomycin    24 hour events:  No acute events noted overnight on chart review  Patient is currently afebrile  Patient's other vitals are stable  Duplex study noted    Subjective:  Patient seen this morning at dialysis  The patient is pleasant and awake    He reports being frustrated by his lack of mobility in his legs and his arm  He does note having some itching and irritation of his skin  He otherwise denies having any nausea, vomiting, chest pain or shortness of breath  Objective:  Vitals:  Temp:  [97 8 °F (36 6 °C)-99 °F (37 2 °C)] 98 1 °F (36 7 °C)  HR:  [71-77] 77  Resp:  [17-22] 20  BP: ()/(53-74) 115/63  SpO2:  [95 %-100 %] 95 %  Temp (24hrs), Av 2 °F (36 8 °C), Min:97 8 °F (36 6 °C), Max:99 °F (37 2 °C)  Current: Temperature: 98 1 °F (36 7 °C)    Physical Exam:   General Appearance:  Alert, interactive, nontoxic, no acute distress  Throat: Oropharynx moist without lesions  Lungs:   Clear to auscultation anteriorly bilaterally; no wheezes, rhonchi or rales; respirations unlabored   Heart:  Irregular rhythm; no murmur, rub or gallop   Abdomen:   Soft, non-tender, non-distended, positive bowel sounds  Extremities: No clubbing, cyanosis; the patient still has ongoing edema in all of his extremities  His left upper extremity does appear swollen but it is much softer on exam and is much less tender to palpation  Skin: No new rashes or lesions  No new draining wounds noted  The previously seen flat areas of erythema or present again along the patient's abdomen and upper thighs  Labs, Imaging, & Other studies:   All pertinent labs and imaging studies were personally reviewed  Results from last 7 days   Lab Units 19  0505 19  2129 19  1309 19  0553  19  0638  19  0449   WBC Thousand/uL  --   --   --  12 11*  --  10 87*  --  13 09*   HEMOGLOBIN g/dL 8 3* 8 6* 8 1* 8 6*   < > 9 1*   < > 7 2*   PLATELETS Thousands/uL  --   --   --  175  --  138*  --  157    < > = values in this interval not displayed       Results from last 7 days   Lab Units 19  0500  19  0449   POTASSIUM mmol/L 3 7   < > 3 4*   CHLORIDE mmol/L 101   < > 101   CO2 mmol/L 26   < > 27   BUN mg/dL 31*   < > 29*   CREATININE mg/dL 3 91*   < > 3 57* EGFR ml/min/1 73sq m 16   < > 18   CALCIUM mg/dL 7 2*   < > 6 9*   AST U/L  --   --  10   ALT U/L  --   --  <6*   ALK PHOS U/L  --   --  78    < > = values in this interval not displayed  Results from last 7 days   Lab Units 03/01/19  1441   C DIFF TOXIN B  NEGATIVE for C difficle toxin by PCR

## 2019-03-07 NOTE — HEMODIALYSIS
Patient became tachycardic 1 5 hours into HD treatment  -150s  Patient asymptomatic  Dr Patrici Mohs and KATHY Akins aware  Cardizem 30 mg given per order  Patient remained tachycardic so treatment terminated one hour early per Dr Patrici Mohs  3 hours of treatment completed and 1 4L removed  Vancomycin 750mg IV given  Post HD vitals: /55, P 111, RR 16, O2 sat 95% on RA  Report given to Primary RNGerman

## 2019-03-07 NOTE — PROGRESS NOTES
General Cardiology   Progress Note -  Team One   Wes Stallings 60 y o  male MRN: [id number]    Unit/Bed#: [facility] 834-01 Encounter: [id number]    Assessment/ Plan    Paroxysmal atrial fibrillation/flutter  Rates usually well-controlled in 70s on metoprolol 50 mg TID and diltiazem 30 mg Q6H  Continues to have Afib with RVR during HD  At time of exam, rates 130s-150s  Improved to 100-110s by end of exam  Last 2 doses of diltiazem held due to hypotension, give 12 noon dose now and monitor  He remains asymptomatic of his tachycardia  Will change to diltiazem  mg daily starting tonight with hold for SBP <90  Pt will not require long term AC from afib standpoint due to significant GI bleeding on warfarin the past (this has been discussed with primary cardiologist, Dr Jerod Goodrich)  Currently on IV heparin for IJ thrombus, hgb stable      IJ thrombus  Significant left upper extremity swelling  Continues on heparin gtt, repeat doppler studies revealed no significant change  Pain control per primary team      Stress cardiomyopathy, with recovered EF 55%  Prior EF 30-35% in setting of sepsis/bacteremia  Repeat limited echo 2/19/19 showed recovery with EF of 55%      Acute kidney injury  Requiring CVVH and now intermittent HD (Tues/Thurs/Sat)  Received an additional 2 hour session yesterday  He continues to have episodes of Afib with RVR during HD, spoke with nephrology who believes related to missed doses of diltiazem and intolerance of fluid removal  They are reducing fluid removal goal from 2 5 L to 2 L today        Chronic LBBB  Normal catheterization 2014     Hx  Of aortic stenosis s/p bioprosthetic [id number]  No vegetation on CHON 1/25/19     Hypertension- 24 hr avg 112/62  Some hypotension overnight, BP 90/53 and midnight and 0600 doses of diltiazem held  MSSA bacteremia/LUE cellulitis  On IV Vancomycin; blood cultures 1/27/19 without growth      Subjective  Pt seen during dialysis   He c/o left arm pain, leg muscle pain/cramping, and back pain  He denies chest pain, palpitations, and shortness of breath  He also states he is hungry  Review of Systems   Constitution: Negative for chills, diaphoresis and malaise/fatigue  Cardiovascular: Positive for leg swelling  Negative for chest pain, dyspnea on exertion, orthopnea, palpitations and syncope  Respiratory: Negative for cough, shortness of breath, sleep disturbances due to breathing and sputum production  Endocrine: Negative  Hematologic/Lymphatic: Negative  Musculoskeletal: Positive for back pain and muscle cramps  Gastrointestinal: Negative for bloating and nausea  Neurological: Negative for dizziness, light-headedness and weakness  All other systems reviewed and are negative  Objective:   Vitals: Blood pressure 115/63, pulse 77, temperature 98 1 °F (36 7 °C), temperature source Oral, resp  rate 20, height 5' 9" (1 753 m), weight (!) 164 kg (361 lb 1 8 oz), SpO2 95 %  ,     Body mass index is 53 33 kg/m²  ,     Systolic (50WLX), MTQ:855 , Min:90 , ILE:141     Diastolic (29JFT), LORETO:87, Min:53, Max:74      Intake/Output Summary (Last 24 hours) at 3/7/2019 0901  Last data filed at 3/7/2019 0847  Gross per 24 hour   Intake 1107 6 ml   Output 2500 ml   Net -1392 4 ml     Weight (last 2 days)     Date/Time   Weight    03/07/19 0600   164 (361 11)  (Abnormal)     03/06/19 0600   167 (368 17)  (Abnormal)     03/05/19 0600   161 (354 94)  (Abnormal)             Telemetry Review: No significant arrhythmias seen on telemetry review  Afib with RVR during dialysis  Physical Exam   Constitutional: He is oriented to person, place, and time  No distress  Pt seen and examined during dialysis  At time of exam, he is laying flat in bed without respiratory distress  He is in rapid Afib but asymptomatic  Neck: Neck supple  No hepatojugular reflux present  JVD difficult to assess due to body habitus  Cardiovascular: Intact distal pulses   An irregularly irregular rhythm present  Tachycardia present  Exam reveals distant heart sounds  Exam reveals no gallop and no friction rub  No murmur heard  Rapid Afib at time of exam, HR initially 130s-150s but improved to 100-110s within a few minutes  Pulmonary/Chest: Effort normal  No respiratory distress  He has decreased breath sounds  He has no rales  Abdominal: Soft  Bowel sounds are normal  He exhibits no distension  There is no tenderness  Genitourinary:   Genitourinary Comments: Anuric  Musculoskeletal: He exhibits edema (3-4+ LE edema, 3+ LUE edema)  Neurological: He is alert and oriented to person, place, and time  Skin: Skin is warm and dry  He is not diaphoretic  No erythema  Psychiatric: He has a normal mood and affect       LABORATORY RESULTS  Results from last 7 days   Lab Units 03/05/19  0638 03/04/19  0449   CK TOTAL U/L 31* 29*     CBC with diff: Results from last 7 days   Lab Units 03/07/19  0505 03/06/19  2129 03/06/19  1309 03/06/19  0553 03/05/19  1923 03/05/19  1117 03/05/19  8320  03/04/19  0449  03/03/19  0624  03/02/19  0617  03/01/19  0618   WBC Thousand/uL  --   --   --  12 11*  --   --  10 87*  --  13 09*  --  12 71*  --  12 68*  --  12 78*   HEMOGLOBIN g/dL 8 3* 8 6* 8 1* 8 6* 8 9* 7 9* 9 1*   < > 7 2*   < > 7 4*   < > 7 5*   < > 8 2*   HEMATOCRIT % 26 9* 28 3* 26 8* 27 0* 28 7* 25 4* 29 5*   < > 23 9*   < > 24 1*   < > 25 1*   < > 26 8*   MCV fL  --   --   --  97  --   --  97  --  98  --  98  --  98  --  99*   PLATELETS Thousands/uL  --   --   --  175  --   --  138*  --  157  --  141*  --  152  --  143*   MCH pg  --   --   --  30 8  --   --  30 0  --  29 6  --  30 2  --  29 4  --  30 1   MCHC g/dL  --   --   --  31 9  --   --  30 8*  --  30 1*  --  30 7*  --  29 9*  --  30 6*   RDW %  --   --   --  15 5*  --   --  15 9*  --  15 9*  --  15 9*  --  16 3*  --  16 2*   MPV fL  --   --   --  11 7  --   --  12 1  --  11 4  --  11 2  --  11 1  --  11 7   NRBC AUTO /100 WBCs  --   --   -- 0  --   --  0  --  0  --  0  --  0  --  0    < > = values in this interval not displayed         CMP:  Results from last 7 days   Lab Units 03/07/19  0500 03/06/19  0552 03/05/19  2612 03/04/19  0449 03/03/19  0624 03/02/19  0617 03/01/19  0618   POTASSIUM mmol/L 3 7 3 5 4 0 3 4* 3 2* 3 1* 3 2*   CHLORIDE mmol/L 101 101 101 101 102 104 106   CO2 mmol/L 26 28 24 27 29 27 29   BUN mg/dL 31* 23 35* 29* 21 22 14   CREATININE mg/dL 3 91* 3 19* 4 39* 3 57* 2 87* 3 02* 2 19*   CALCIUM mg/dL 7 2* 7 0* 7 1* 6 9* 6 8* 7 0* 7 1*   AST U/L  --   --   --  10  --   --   --    ALT U/L  --   --   --  <6*  --   --   --    ALK PHOS U/L  --   --   --  78  --   --   --    EGFR ml/min/1 73sq m 16 20 14 18 23 22 32     BMP:  Results from last 7 days   Lab Units 03/07/19  0500 03/06/19  0552 03/05/19  8257 03/04/19  0449 03/03/19  0624 03/02/19  0617 03/01/19  0618   POTASSIUM mmol/L 3 7 3 5 4 0 3 4* 3 2* 3 1* 3 2*   CHLORIDE mmol/L 101 101 101 101 102 104 106   CO2 mmol/L 26 28 24 27 29 27 29   BUN mg/dL 31* 23 35* 29* 21 22 14   CREATININE mg/dL 3 91* 3 19* 4 39* 3 57* 2 87* 3 02* 2 19*   CALCIUM mg/dL 7 2* 7 0* 7 1* 6 9* 6 8* 7 0* 7 1*     Lab Results   Component Value Date    NTBN 25,015 (H) 02/06/2019    NTBN 25,548 (H) 01/23/2019      Results from last 7 days   Lab Units 03/07/19  0500 03/06/19  0552 03/05/19  0638 03/04/19  0449 03/03/19  0624 03/02/19  0617 03/01/19  0618   MAGNESIUM mg/dL 1 8 1 8 1 9 1 9 1 8 1 9 2 0     Results from last 7 days   Lab Units 03/01/19  1715   INR  1 28*     Lipid Profile:   Lab Results   Component Value Date    CHOL 146 07/18/2014    CHOL 145 02/21/2014     Lab Results   Component Value Date    HDL 44 02/13/2019    HDL 41 06/02/2018    HDL 52 06/27/2017     Lab Results   Component Value Date    LDLCALC 77 02/13/2019    LDLCALC 57 06/02/2018    LDLCALC 129 (H) 06/27/2017     Lab Results   Component Value Date    TRIG 129 02/13/2019    TRIG 102 06/02/2018    TRIG 71 06/27/2017     Cardiac testing: Results for orders placed during the hospital encounter of 19   Echo complete with contrast if indicated    Narrative [address]  [address], [address]  [phone]    Transthoracic Echocardiogram  2D, Doppler, and Color Doppler    Study date:  2019    Patient: Tori Fuchs  MR number: [id number]  Account number: [de-identified]  : 1960  Age: 60 years  Gender: Male  Status: Inpatient  Location: Bedside  Height: 72 in  Weight: 339 lb  BP: 89/ 54 mmHg    Indications: chest pain    Diagnoses: R07 9 - Chest pain, unspecified    Sonographer:  Twyla Greenwood RD, CCT  Primary Physician:  Saskia Roberts DO  Referring Physician:  Nancy Mccoy DO  Group:  [facility]  Interpreting Physician:  Gabbie Alcantara DO    SUMMARY    PROCEDURE INFORMATION:  This was a technically difficult study despite the administration of echo contrast     LEFT VENTRICLE:  Systolic function was markedly reduced  Ejection fraction was estimated to be 30 %  There was severe diffuse hypokinesis with no obvious identifiable regional variations  Wall thickness was moderately increased  Concentric hypertrophy was present  VENTRICULAR SEPTUM:  There was dyssynergic motion  RIGHT VENTRICLE:  The ventricle was mildly dilated  Systolic function was moderately to markedly reduced  LEFT ATRIUM:  The atrium was mildly dilated  RIGHT ATRIUM:  The atrium was mildly dilated  AORTIC VALVE:  A bioprosthesis was present  It was not well visualized  Mean transvalvular gradient 13 mmHg  TRICUSPID VALVE:  There was mild regurgitation  PERICARDIUM:  A small, partially loculated pericardial effusion was identified along the left ventricular free wall  HISTORY: PRIOR HISTORY: Aortic valve prosthesis    PROCEDURE: The procedure was performed at the bedside  This was a routine study  The transthoracic approach was used   The study included complete 2D imaging, complete spectral Doppler, and color Doppler  The heart rate was 80 bpm, at the  start of the study  Intravenous contrast (Definity solution [1 3 ml Definity/8 7ml normal saline solution]) was administered  Intravenous contrast (Definity solution [1 3 ml Definity/8 7ml normal saline solution]) was administered to  opacify the left ventricle and enhance Doppler signals  Echocardiographic views were limited due to low windows and patient on mechanical ventilator  This was a technically difficult study despite the administration of echo contrast     LEFT VENTRICLE: Size was normal  Systolic function was markedly reduced  Ejection fraction was estimated to be 30 %  There was severe diffuse hypokinesis with no obvious identifiable regional variations  Wall thickness was moderately  increased  Concentric hypertrophy was present  DOPPLER: Normal sinus rhythm was absent  The study was not technically sufficient to allow evaluation of LV diastolic function  VENTRICULAR SEPTUM: There was dyssynergic motion  RIGHT VENTRICLE: The ventricle was mildly dilated  Systolic function was moderately to markedly reduced  LEFT ATRIUM: The atrium was mildly dilated  RIGHT ATRIUM: The atrium was mildly dilated  MITRAL VALVE: Not well visualized  DOPPLER: The transmitral velocity was within the normal range  There was no evidence for stenosis  There was no significant regurgitation  AORTIC VALVE: A bioprosthesis was present  It was not well visualized  Mean transvalvular gradient 13 mmHg  DOPPLER: There was no significant regurgitation  TRICUSPID VALVE: The valve structure was normal  There was normal leaflet separation  DOPPLER: The transtricuspid velocity was within the normal range  There was no evidence for stenosis  There was mild regurgitation  The tricuspid jet  envelope definition was inadequate for estimation of RV systolic pressure      PULMONIC VALVE: Leaflets exhibited normal thickness, no calcification, and normal cuspal separation  DOPPLER: The transpulmonic velocity was within the normal range  There was no significant regurgitation  PERICARDIUM: A small, partially loculated pericardial effusion was identified along the left ventricular free wall  The pericardium was normal in appearance  AORTA: The root exhibited normal size  SYSTEM MEASUREMENT TABLES    2D  %FS: 15 83 %  Ao Diam: 3 09 cm  EDV(Teich): 221 88 ml  EF(Teich): 32 54 %  ESV(Teich): 149 69 ml  IVSd: 1 59 cm  LA Diam: 5 18 cm  LVIDd: 6 58 cm  LVIDs: 5 54 cm  LVPWd: 1 43 cm  SV(Teich): 72 19 ml    CW  AV Env  Ti: 233 56 ms  AV VTI: 49 77 cm  AV Vmax: 2 64 m/s  AV Vmax: 2 67 m/s  AV Vmean: 2 13 m/s  AV maxP 95 mmHg  AV maxP 53 mmHg  AV meanP 73 mmHg    PW  LVOT Env  Ti: 215 22 ms  LVOT VTI: 11 41 cm  LVOT Vmax: 0 59 m/s  LVOT Vmax: 0 7 m/s  LVOT Vmean: 0 52 m/s  LVOT maxP 78 mmHg  LVOT maxP mmHg  LVOT meanP 24 mmHg  MV E Deep: 1 01 m/s    [facility]    Prepared and electronically signed by    Rocio Robins DO  Signed 2019 10:14:55       Results for orders placed during the hospital encounter of 19   CHON    Narrative [facility] [address]  [facility], [address] [place] [address]  [phone]    Transesophageal Echocardiogram  2D, Doppler, and Color Doppler    Study date:  2019    Patient: Kristina Mars  MR number: [id number]  Account number: [de-identified]  : 1960  Age: 60 years  Gender: Male  Status: Inpatient  Location: Bedside  Height: 69 in  Weight: 319 lb  BP: 101/ 54 mmHg    Indications: Bacteremia      Diagnoses: R78 81 - Bacteremia    Sonographer:  Neno Fang RDCS  Interpreting Physician:  Mitra Oseguera DO  Primary Physician:  Clair Olsen DO  Referring Physician:  Cristina Bai DO  Group:  [facility]  Cardiology Fellow:  Isabella Rubalcava MD    IMPRESSIONS:  There was no echocardiographic evidence for valvular vegetation  SUMMARY    LEFT VENTRICLE:  Systolic function was moderately reduced  Ejection fraction was estimated to be 40 %  There was moderate diffuse hypokinesis  Wall thickness was mildly increased  There was mild concentric hypertrophy  RIGHT VENTRICLE:  The size was normal   Systolic function was mildly reduced  LEFT ATRIUM:  The atrium was mildly dilated  ATRIAL SEPTUM:  There was a small patent foramen ovale with left to right shunt identified by color Doppler  RIGHT ATRIUM:  The atrium was mildly dilated  MITRAL VALVE:  There was trace regurgitation  There was no echocardiographic evidence of vegetation  AORTIC VALVE:  A bioprosthesis was present  It exhibited normal function  There was no echocardiographic evidence of vegetation  TRICUSPID VALVE:  There was mild regurgitation  There was no echocardiographic evidence of vegetation  HISTORY: PRIOR HISTORY: Aortic valve replacement, NSTEMI, Cardiomyopathy, Acute kidney injury  PROCEDURE: The procedure was performed at the bedside  This was a routine study  The risks and alternatives of the procedure were explained to the patient's next of kin and informed consent was obtained  The transesophageal approach was  used  The study included complete 2D imaging, complete spectral Doppler, and color Doppler  The heart rate was 113 bpm, at the start of the study  An adult omniplane probe was inserted by the cardiology fellow under direct supervision of  the attending cardiologist  Intubated with ease  One intubation attempt(s)  There was no blood detected on the probe  Image quality was adequate  There were no complications during the procedure  MEDICATIONS: Sedation administered by  bedside nurse  LEFT VENTRICLE: Size was normal  Systolic function was moderately reduced  Ejection fraction was estimated to be 40 %  There was moderate diffuse hypokinesis  Wall thickness was mildly increased  There was mild concentric hypertrophy  DOPPLER: The study was not technically sufficient to allow evaluation of LV diastolic function  RIGHT VENTRICLE: The size was normal  Systolic function was mildly reduced  Wall thickness was normal     LEFT ATRIUM: The atrium was mildly dilated  No thrombus was identified  APPENDAGE: The appendage was small  No thrombus was identified  DOPPLER: The function was normal (normal emptying velocity)  ATRIAL SEPTUM: There was a small patent foramen ovale with left to right shunt identified by color Doppler  RIGHT ATRIUM: The atrium was mildly dilated  No thrombus was identified  MITRAL VALVE: Valve structure was normal  There was normal leaflet separation  There was no echocardiographic evidence of vegetation  DOPPLER: There was trace regurgitation  AORTIC VALVE: A bioprosthesis was present  It exhibited normal function  There was no echocardiographic evidence of vegetation  TRICUSPID VALVE: The valve structure was normal  There was normal leaflet separation  There was no echocardiographic evidence of vegetation  DOPPLER: There was mild regurgitation  PULMONIC VALVE: Leaflets exhibited normal thickness, no calcification, and normal cuspal separation  There was no echocardiographic evidence of vegetation  DOPPLER: There was no significant regurgitation  PERICARDIUM: There was no pericardial effusion seen in the images obtained  AORTA: The root exhibited normal size  There was no atheroma  There was no evidence for dissection  There was no evidence for aneurysm      [facility]    Prepared and electronically signed by    Damon Peoples DO  Signed 25-Jan-2019 14:01:14       Meds/Allergies   all current active meds have been reviewed and current meds:   Current Facility-Administered Medications   Medication Dose Route Frequency    acetaminophen (TYLENOL) tablet 650 mg  650 mg Oral Q6H PRN    atorvastatin (LIPITOR) tablet 40 mg  40 mg Oral Daily With Dinner    b complex-vitamin C-folic acid (NEPHROCAPS) capsule 1 capsule  1 capsule Oral Daily With Dinner    bisacodyl (DULCOLAX) rectal suppository 10 mg  10 mg Rectal Daily PRN    diltiazem (CARDIZEM) tablet 30 mg  30 mg Oral Q6H [facility]    heparin (porcine) 25,000 units in 250 mL infusion (premix)  3-30 Units/kg/hr (Order-Specific) Intravenous Titrated    heparin (porcine) injection 10,000 Units  10,000 Units Intravenous PRN    heparin (porcine) injection 5,000 Units  5,000 Units Intravenous PRN    hydrocortisone 1 % cream   Topical BID    melatonin tablet 6 mg  6 mg Oral HS    metoprolol (LOPRESSOR) injection 5 mg  5 mg Intravenous Q6H PRN    metoprolol tartrate (LOPRESSOR) tablet 50 mg  50 mg Oral TID    midodrine (PROAMATINE) tablet 10 mg  10 mg Oral Before Dialysis    nystatin (MYCOSTATIN) powder   Topical BID    omeprazole (PRILOSEC) suspension 2 mg/mL  20 mg Oral BID AC    oxyCODONE (ROXICODONE) IR tablet 5 mg  5 mg Oral Q4H PRN    oxyCODONE (ROXICODONE) IR tablet 7 5 mg  7 5 mg Oral Q4H PRN    polyvinyl alcohol (LIQUIFILM TEARS) 1 4 % ophthalmic solution 1 drop  1 drop Both Eyes Q3H PRN    vancomycin (VANCOCIN) IVPB (premix) 750 mg  10 mg/kg (Adjusted) Intravenous Once per day on Tue Thu Sat     Medications Prior to Admission   Medication    amLODIPine (NORVASC) 5 mg tablet    ascorbic acid (VITAMIN C) 500 MG tablet    aspirin (ECOTRIN) 325 mg EC tablet    fluticasone (FLONASE) 50 mcg/act nasal spray    loratadine (CLARITIN) 10 mg tablet    metoprolol tartrate (LOPRESSOR) 100 mg tablet    potassium chloride (K-DUR,KLOR-CON) 20 mEq tablet    pravastatin (PRAVACHOL) 40 mg tablet    torsemide (DEMADEX) 20 mg tablet       heparin (porcine) 3-30 Units/kg/hr (Order-Specific) Last Rate: 8 Units/kg/hr (03/07/19 0700)     Counseling / Coordination of Care  Total floor / unit time spent today 20 minutes    Greater than 50% of total time was spent with the patient and / or family counseling and / or coordination of care  ** Please Note: Dragon 360 Dictation voice to text software may have been used in the creation of this document   **

## 2019-03-07 NOTE — PLAN OF CARE
Problem: Potential for Falls  Goal: Patient will remain free of falls  Description  INTERVENTIONS:  - Assess patient frequently for physical needs  -  Identify cognitive and physical deficits and behaviors that affect risk of falls  -  [place] fall precautions as indicated by assessment   - Educate patient/family on patient safety including physical limitations  - Instruct patient to call for assistance with activity based on assessment  - Modify environment to reduce risk of injury  - Consider OT/PT consult to assist with strengthening/mobility    Outcome: Progressing     Problem: Prexisting or High Potential for Compromised Skin Integrity  Goal: Skin integrity is maintained or improved  Description  INTERVENTIONS:  - Identify patients at risk for skin breakdown  - Assess and monitor skin integrity  - Assess and monitor nutrition and hydration status  - Monitor labs (i e  albumin)  - Assess for incontinence   - Turn and reposition patient  - Assist with mobility/ambulation  - Relieve pressure over bony prominences  - Avoid friction and shearing  - Provide appropriate hygiene as needed including keeping skin clean and dry  - Evaluate need for skin moisturizer/barrier cream  - Collaborate with interdisciplinary team (i e  Nutrition, Rehabilitation, etc )   - Patient/family teaching   Outcome: Progressing     Problem: Nutrition/Hydration-ADULT  Goal: Nutrient/Hydration intake appropriate for improving, restoring or maintaining nutritional needs  Description  Monitor and assess patient's nutrition/hydration status for malnutrition (ex- brittle hair, bruises, dry skin, pale skin and conjunctiva, muscle wasting, smooth red tongue, and disorientation)  Collaborate with interdisciplinary team and initiate plan and interventions as ordered  Monitor patient's weight and dietary intake as ordered or per policy  Utilize nutrition screening tool and intervene per policy   Determine patient's food preferences and provide high-protein, high-caloric foods as appropriate  INTERVENTIONS:  - Monitor oral intake, urinary output, labs, and treatment plans  - Assess nutrition and hydration status and recommend course of action  - Evaluate amount of meals eaten  - Assist patient with eating if necessary   - Allow adequate time for meals  - Recommend/ encourage appropriate diets, oral nutritional supplements, and vitamin/mineral supplements  - Order, calculate, and assess calorie counts as needed  - Recommend, monitor, and adjust tube feedings and TPN/PPN based on assessed needs  - Assess need for intravenous fluids  - Provide specific nutrition/hydration education as appropriate  - Include patient/family/caregiver in decisions related to nutrition   Outcome: Progressing     Problem: CARDIOVASCULAR - ADULT  Goal: Maintains optimal cardiac output and hemodynamic stability  Description  INTERVENTIONS:  - Monitor I/O, vital signs and rhythm  - Monitor for S/S and trends of decreased cardiac output i e  bleeding, hypotension  - Administer and titrate ordered vasoactive medications to optimize hemodynamic stability  - Assess quality of pulses, skin color and temperature  - Assess for signs of decreased coronary artery perfusion - ex   Angina  - Instruct patient to report change in severity of symptoms   Outcome: Progressing  Goal: Absence of cardiac dysrhythmias or at baseline rhythm  Description  INTERVENTIONS:  - Continuous cardiac monitoring, monitor vital signs, obtain 12 lead EKG if indicated  - Administer antiarrhythmic and heart rate control medications as ordered  - Monitor electrolytes and administer replacement therapy as ordered   Outcome: Progressing     Problem: METABOLIC, FLUID AND ELECTROLYTES - ADULT  Goal: Electrolytes maintained within normal limits  Description  INTERVENTIONS:  - Monitor labs and assess patient for signs and symptoms of electrolyte imbalances  - Administer electrolyte replacement as ordered  - Monitor response to electrolyte replacements, including repeat lab results as appropriate  - Instruct patient on fluid and nutrition as appropriate   Outcome: Progressing  Goal: Fluid balance maintained  Description  INTERVENTIONS:  - Monitor labs and assess for signs and symptoms of volume excess or deficit  - Monitor I/O and WT  - Instruct patient on fluid and nutrition as appropriate   Outcome: Progressing     Problem: PAIN - ADULT  Goal: Verbalizes/displays adequate comfort level or baseline comfort level  Description  Interventions:  - Encourage patient to monitor pain and request assistance  - Assess pain using appropriate pain scale  - Administer analgesics based on type and severity of pain and evaluate response  - Implement non-pharmacological measures as appropriate and evaluate response  - Consider cultural and social influences on pain and pain management  - Notify physician/advanced practitioner if interventions unsuccessful or patient reports new pain   Outcome: Progressing     Problem: INFECTION - ADULT  Goal: Absence or prevention of progression during hospitalization  Description  INTERVENTIONS:  - Assess and monitor for signs and symptoms of infection  - Monitor lab/diagnostic results  - Monitor all insertion sites, i e  indwelling lines, tubes, and drains  - Monitor endotracheal (as able) and nasal secretions for changes in amount and color  - [place] appropriate cooling/warming therapies per order  - Administer medications as ordered  - Instruct and encourage patient and family to use good hand hygiene technique  - Identify and instruct in appropriate isolation precautions for identified infection/condition    Outcome: Progressing     Problem: SAFETY ADULT  Goal: Patient will remain free of falls  Description  INTERVENTIONS:  - Assess patient frequently for physical needs  -  Identify cognitive and physical deficits and behaviors that affect risk of falls    -  [place] fall precautions as indicated by assessment   - Educate patient/family on patient safety including physical limitations  - Instruct patient to call for assistance with activity based on assessment  - Modify environment to reduce risk of injury  - Consider OT/PT consult to assist with strengthening/mobility    Outcome: Progressing  Goal: Maintain or return to baseline ADL function  Description  INTERVENTIONS:  -  Assess patient's ability to carry out ADLs; assess patient's baseline for ADL function and identify physical deficits which impact ability to perform ADLs (bathing, care of mouth/teeth, toileting, grooming, dressing, etc )  - Assess/evaluate cause of self-care deficits   - Assess range of motion  - Assess patient's mobility; develop plan if impaired  - Assess patient's need for assistive devices and provide as appropriate  - Encourage maximum independence but intervene and supervise when necessary  ¯ Involve family in performance of ADLs  ¯ Assess for home care needs following discharge   ¯ Request OT consult to assist with ADL evaluation and planning for discharge  ¯ Provide patient education as appropriate    Outcome: Progressing  Goal: Maintain or return mobility status to optimal level  Description  INTERVENTIONS:  - Assess patient's baseline mobility status (ambulation, transfers, stairs, etc )    - Identify cognitive and physical deficits and behaviors that affect mobility  - Identify mobility aids required to assist with transfers and/or ambulation (gait belt, sit-to-stand, lift, walker, cane, etc )  - [place] fall precautions as indicated by assessment  - Record patient progress and toleration of activity level on Mobility SBAR; progress patient to next Phase/Stage  - Instruct patient to call for assistance with activity based on assessment  - Request Rehabilitation consult to assist with strengthening/weightbearing, etc     Outcome: Progressing     Problem: DISCHARGE PLANNING  Goal: Discharge to home or other facility with appropriate resources  Description  INTERVENTIONS:  - Identify barriers to discharge w/patient and caregiver  - Arrange for needed discharge resources and transportation as appropriate  - Identify discharge learning needs (meds, wound care, etc )  - Arrange for interpretive services to assist at discharge as needed  - Refer to Case Management Department for coordinating discharge planning if the patient needs post-hospital services based on physician/advanced practitioner order or complex needs related to functional status, cognitive ability, or social support system   Outcome: Progressing     Problem: Knowledge Deficit  Goal: Patient/family/caregiver demonstrates understanding of disease process, treatment plan, medications, and discharge instructions  Description  Complete learning assessment and assess knowledge base    Interventions:  - Provide teaching at level of understanding  - Provide teaching via preferred learning methods   Outcome: Progressing     Problem: GENITOURINARY - ADULT  Goal: Maintains or returns to baseline urinary function  Description  INTERVENTIONS:  - Assess urinary function  - Encourage oral fluids to ensure adequate hydration  - Administer IV fluids as ordered to ensure adequate hydration  - Administer ordered medications as needed  - Offer frequent toileting  - Follow urinary retention protocol if ordered   Outcome: Progressing  Goal: Absence of urinary retention  Description  INTERVENTIONS:  - Assess patient?s ability to void and empty bladder  - Monitor I/O  - Bladder scan as needed  - Discuss with physician/AP medications to alleviate retention as needed  - Discuss catheterization for long term situations as appropriate   Outcome: Progressing     Problem: DISCHARGE PLANNING - CARE MANAGEMENT  Goal: Discharge to post-acute care or home with appropriate resources  Description  INTERVENTIONS:  - Conduct assessment to determine patient/family and health care team treatment goals, and need for post-acute services based on payer coverage, community resources, and patient preferences, and barriers to discharge  - Address psychosocial, clinical, and financial barriers to discharge as identified in assessment in conjunction with the patient/family and health care team  - Arrange appropriate level of post-acute services according to patient's   needs and preference and payer coverage in collaboration with the physician and health care team  - Communicate with and update the patient/family, physician, and health care team regarding progress on the discharge plan  - Arrange appropriate transportation to post-acute venues  - Pt to d/c with appropriate resources when medically stable     Outcome: Progressing

## 2019-03-07 NOTE — SOCIAL WORK
CM discussed pt during care coordination rounds and no anticipated weekend d/c   Cm updated hometown and cm called [facility] [phone] ext [phone]  And left a voice message with updates   When medically clear cm will need insurance auth and hometown uses [employer] or [facility] for transportation   Cm will follow

## 2019-03-07 NOTE — OCCUPATIONAL THERAPY NOTE
Occupational Therapy Cancellation Note    Chart reviewed  Pt is currently off unit for dialysis  OT to continue to follow and re-attempt session at a later time      Sukumar Chaparro, OT

## 2019-03-07 NOTE — SPEECH THERAPY NOTE
Speech Language/Pathology    Speech/Language Pathology Progress Note    Patient Name: Wilberto Suggs  [id number] Date: 3/7/2019     Problem List  Patient Active Problem List   Diagnosis    Aortic stenosis, mild    Essential hypertension    Hyperlipidemia    Left bundle branch block    Murmur    Osteoarthritis of both knees    Pericardial disease    Sciatica    Age-related cataract of right eye    Venous insufficiency    Bilateral leg edema    Stress-induced cardiomyopathy    Acute respiratory failure with hypoxia ([facility] [address] )    History of aortic valve replacement with bioprosthetic valve    Persistent atrial fibrillation (HCC)    MSSA bacteremia - Resolved septic shock    Thrombocytopenia (HCC)    Acute blood loss anemia - Gastric ulcer    Leukocytosis    Cerebrovascular accident ([address] )    Acute metabolic encephalopathy    Skin rash    Acute renal failure    Hypovolemic shock (HCC)    GI bleed    Co[facility]opathy ([facility])    GI bleeding    Age-related nuclear cataract, bilateral    Open angle with borderline findings, low risk, bilateral    Presence of intraocular lens    Vitreous degeneration of both eyes    Left arm swelling    Deep tissue injury    Dysphagia    Cellulitis/myositis of left forearm    Left internal jugular vein thrombus    Atrial flutter/fibrillation - Stress-induced cardiomyopathy     Morbid obesity         Past Medical History  Past Medical History:   Diagnosis Date    Allergic rhinitis     last assessed 9/12/12    Finger fracture, right     Closed fx of the middle phalanx of the right 5th finger  last assessed 1/30/14    Hemorrhoids     last assessed 2/10/14    Hyperlipidemia     Hypertension         Past Surgical History  Past Surgical History:   Procedure Laterality Date    AORTIC VALVE REPLACEMENT  07/30/2014    AVR with 25mm [facility] Ease bovine pericardial valve    CARDIAC CATHETERIZATION  07/18/2014    SLB left main-normal, circumflex-normal, ramus intermedius-normal, RCA was large and dominant giving rise to the PDA & a large posterolateral branch  No disease  last assessed 8/19/14     COLONOSCOPY N/A 2/25/2019    Procedure: COLONOSCOPY;  Surgeon: Anson Forbes DO;  [facility]: [facility]; Service: Gastroenterology    ESOPHAGOGASTRODUODENOSCOPY N/A 2/22/2019    Procedure: ESOPHAGOGASTRODUODENOSCOPY (EGD)-roadshow overnight;  Surgeon: Anson Forbes DO;  [facility]; Service: Gastroenterology    ESOPHAGOGASTRODUODENOSCOPY N/A 2/23/2019    Procedure: ESOPHAGOGASTRODUODENOSCOPY (EGD); Surgeon: Hyacinth Gomez MD;  [facility]; Service: Gastroenterology    ESOPHAGOGASTRODUODENOSCOPY N/A 2/25/2019    Procedure: ESOPHAGOGASTRODUODENOSCOPY (EGD); Surgeon: Anson Forbes DO;  [facility]; Service: Gastroenterology    IR CENTRAL LINE PLACEMENT  3/1/2019    IR [id number] PLACEMENT  2/4/2019    IR PERMACATH PLACEMENT  2/18/2019    KNEE CARTILAGE SURGERY Left     medial meniscus tear     TESTICLE SURGERY      TONSILLECTOMY AND ADENOIDECTOMY      TOOTH EXTRACTION           Subjective:  Pt seen for dysphagia tx  OT in room as well assisting with sitting patient up at side of bed, which pt requested  "I'm fine with this diet, I'm starving!"    Objective:  Pt was assisted with eating, including coarsely chopped chicken,mashed potatoes, and cooked carrots  He drank water and milk by straw  Mastication was prolonged, especially with the cooked carrots, which were not fully cooked  Pt is edentulous  Bolus formation and transfer was mildly prolonged  Pharyngeal Swallows appeared timely, with hyolaryngeal elevation observed  Swallows of thin liquids by straw, with single and consecutive sips were prompt  There were no s/s of aspiration  During the meal, while pt was sitting on the side of the bed, he began to slide off the bed  Attempted to stop the slide, with the OT, but unable, and pt was slowly lowered to the floor   Event report was completed  Please see  report for details  Assessment:  Pt is satisfied with level 3 diet, and this appears appropriate for him, given prolonged mastication and bolus formation  He is not appropriate for diet upgrade at this time  No s/s of aspiration  Plan/Recommendations:  Continue level 3 diet and thin liquids     Discharge from [address] [place] [address]

## 2019-03-07 NOTE — PROGRESS NOTES
Patient taken to HD during hand off report  Patient alert  Refused breakfast tray at this time  Denies any pain  No acute distress noted  IV patent  Heparin dosing verified with hand off RN  Vitals stable  Will fully assess upon return from HD

## 2019-03-07 NOTE — PROGRESS NOTES
Vancomycin Assessment    Simi Osoroi is a 60 y o  male who is currently receiving vancomycin 750 tts for bacteremia, skin-soft tissue infection myositis   Relevant clinical data and objective history reviewed:  Creatinine   Date Value Ref Range Status   03/07/2019 3 91 (H) 0 60 - 1 30 mg/dL Final     Comment:     Standardized to IDMS reference method   03/06/2019 3 19 (H) 0 60 - 1 30 mg/dL Final     Comment:     Standardized to IDMS reference method   03/05/2019 4 39 (H) 0 60 - 1 30 mg/dL Final     Comment:     Standardized to IDMS reference method   04/09/2015 0 67 0 60 - 1 30 mg/dL Final     Comment:     Standardized to IDMS reference method   11/15/2014 0 70 0 60 - 1 30 mg/dL Final     Comment:     Standardized to IDMS reference method   11/02/2014 0 86 0 60 - 1 30 mg/dL Final     Comment:     Standardized to IDMS reference method     Vancomycin Rm   Date Value Ref Range Status   03/04/2019 20 3 ug/mL Final     /55 (BP Location: Right arm)   Pulse (!) 111   Temp 97 5 °F (36 4 °C) (Tympanic)   Resp 18   Ht 5' 9" (1 753 m)   Wt (!) 164 kg (361 lb 1 8 oz)   SpO2 95%   BMI 53 33 kg/m²   I/O last 3 completed shifts: In: 9141 [P O :120; I V :1392]  Out: 2500 [Other:2500]  Lab Results   Component Value Date/Time    BUN 31 (H) 03/07/2019 05:00 AM    BUN 17 04/09/2015 11:41 AM    WBC 12 11 (H) 03/06/2019 05:53 AM    WBC 6 71 11/02/2014 05:56 AM    HGB 8 3 (L) 03/07/2019 05:05 AM    HGB 11 9 (L) 11/02/2014 05:56 AM    HCT 26 9 (L) 03/07/2019 05:05 AM    HCT 39 3 11/02/2014 05:56 AM    MCV 97 03/06/2019 05:53 AM    MCV 81 (L) 11/02/2014 05:56 AM     03/06/2019 05:53 AM     11/02/2014 05:56 AM     Temp Readings from Last 3 Encounters:   03/07/19 97 5 °F (36 4 °C) (Tympanic)   01/24/19 99 8 °F (37 7 °C) (Tympanic)   06/23/15 97 8 °F (36 6 °C)     Vancomycin Days of Therapy: 6    Assessment/Plan  The patient is currently on vancomycin utilizing scheduled dosing    Baseline risks associated with therapy include: pre-existing renal impairment  The patient is receiving 750 tts with the most recent vancomycin level being at steady-state and therapeutic based on a goal of 15-20 (appropriate for most indications) ; therefore, after clinical evaluation will be changed to 1 gm tu-th-sa  adj bw was re-entered recently  Pharmacy will continue to follow closely for s/sx of nephrotoxicity, infusion reactions and appropriateness of therapy  BMP and CBC will be ordered per protocol  Plan for trough with am labs mar9  Pharmacy will continue to follow the patient?s culture results and clinical progress daily      Priyanka Mcclendon, Pharmacist

## 2019-03-07 NOTE — PROGRESS NOTES
[facility] Hospitalist Service - Internal Medicine Progress Note       PATIENT INFORMATION      Patient: Abel Almeida 60 y o  male   MRN: [id number]  PCP: Mary Vernon DO  [facility] Encounter: [id number]  Date Of Visit: 03/07/19       ASSESSMENTS & PLAN       MSSA bacteremia - Resolved septic shock  Assessment & Plan  - CHON negative for endocarditis  - currently on an IV Vancomycin monotherapy regimen now per ID through 3/10 - off Ancef    Cellulitis/myositis of left forearm  Assessment & Plan  - appreciate Infectious Disease input - c/w IV Vancomycin   - CT of forearm on 3/5 noted per radiologist to be concerning and read as follows: "Extensive subcutaneous edema throughout the forearm with associated radial muscle edema concerning for nonspecific cellulitis and myositis  Additionally, there is deep fascial edema noted along the radial soft tissues of the proximal forearm raising suspicion for necrotizing fasciitis without onelia soft tissue emphysema at this time  No drainable abscess collection    No underlying osseous destructive changes to indicate osteomyelitis "  - CPK currently normal - appreciate general surgery who did not feel this is necrotizing fasciitis and recommended a repeat LUE venous doppler to assess progression of DVT which revealed no significant change from prior study with active presence of nonocclusive left IJ DVT along with nonocclusive superficial thrombophlebitis from the cephalic vein down to the antecubital fossa   - continue PRN pain control     Acute renal failure  Assessment & Plan  - likely secondary to recent septic shock  - continue hemodialysis per nephrology  - limit/avoid nephrotoxins as possible - home Demadex held    - continue Nephrocaps    Left internal jugular vein thrombus  Assessment & Plan  - remains on a heparin drip  - PRN pain control   - no significant changes on repeat doppler imaging study      Atrial flutter/fibrillation - Stress-induced cardiomyopathy   Assessment & Plan  - continue Cardizem/ Lopressor regimen per cardiology - uncontrolled tachycardia from this morning during hemodialysis now improved  - currently anticoagulated on heparin drip for a coexisting venous thrombus - cardiology however recommending no chronic anticoagulation and stated he may be a candidate for a Watchman device in the future if medically stable   - monitor/replete electrolytes as necessary - hypokalemia resolved  - EF improved from 30 -> 55% on repeat limited echocardiogram likely from recovering septic shock    Acute blood loss anemia - Gastric ulcer  Assessment & Plan  - s/p injection/clipping  - c/w PPI regimen  - H/H stable    Morbid obesity   Assessment & Plan  - BMI of 52 42  - lifestyle/diet modifications    Acute metabolic encephalopathy  Assessment & Plan  - waxing/waning - likely secondary to recovered septic shock in the setting of multiple medical issues  - clinical monitoring     Essential hypertension  Assessment & Plan  - continue Lopressor/Cardizem    History of aortic valve replacement with bioprosthetic valve  Assessment & Plan  - cardiology following  - no evidence of endocarditis on echocardiogram      VTE Prophylaxis:  Heparin drip       SUBJECTIVE     Seen/examined earlier in the day while at dialysis this morning  States he feels tired and frustrated  He does acknowledge want to get better  Later in the day received call from nursing that the patient fell while working with physical therapy and complain of some knee pain for which an XR of the left knee was ordered  OBJECTIVE     Vitals:   Temp (24hrs), Av 1 °F (36 7 °C), Min:97 5 °F (36 4 °C), Max:99 °F (37 2 °C)    Temp:  [97 5 °F (36 4 °C)-99 °F (37 2 °C)] 97 5 °F (36 4 °C)  HR:  [] 111  Resp:  [17-22] 18  BP: ()/(42-81) 106/55  SpO2:  [95 %-100 %] 96 %  Body mass index is 53 33 kg/m²  Input and Output Summary (last 24 hours):        Intake/Output Summary (Last 24 hours) at 3/7/2019 1618  Last data filed at 3/7/2019 1431  Gross per 24 hour   Intake 1233 6 ml   Output 1810 ml   Net -576 4 ml       Physical Exam:     GENERAL:  Obese - weak/fatigued  HEAD:  Normocephalic - atraumatic  EYES: PERRL - EOMI   MOUTH:  Mucosa moist  NECK:  Supple - full range of motion  CARDIAC:  Irregularly irregular today with intermittent tachycardia - S1/S2 positive  PULMONARY:  Diminished bibasilar breath sounds  ABDOMEN:  Soft - nontender/nondistended - active bowel sounds - anasarca noted   MUSCULOSKELETAL:  Motor strength/range of motion markedly deconditioned - LE and UE pitting edema noted   NEUROLOGIC:  Awake/alert   SKIN:  Left forearm erythema present - chronic wrinkles/blemishes   PSYCHIATRIC:  Mood/affect flat      ADDITIONAL DATA       Labs & Recent Cultures:     Results from last 7 days   Lab Units 03/07/19  0505  03/06/19  0553   WBC Thousand/uL  --   --  12 11*   HEMOGLOBIN g/dL 8 3*   < > 8 6*   HEMATOCRIT % 26 9*   < > 27 0*   PLATELETS Thousands/uL  --   --  175   NEUTROS PCT %  --   --  69   LYMPHS PCT %  --   --  6*   MONOS PCT %  --   --  6   EOS PCT %  --   --  16*    < > = values in this interval not displayed  Results from last 7 days   Lab Units 03/07/19  0500  03/04/19  0449   SODIUM mmol/L 135*   < > 136   POTASSIUM mmol/L 3 7   < > 3 4*   CHLORIDE mmol/L 101   < > 101   CO2 mmol/L 26   < > 27   BUN mg/dL 31*   < > 29*   CREATININE mg/dL 3 91*   < > 3 57*   CALCIUM mg/dL 7 2*   < > 6 9*   ALK PHOS U/L  --   --  78   ALT U/L  --   --  <6*   AST U/L  --   --  10    < > = values in this interval not displayed  Results from last 7 days   Lab Units 03/01/19  1715   INR  1 28*         Results from last 7 days   Lab Units 03/01/19  1441   C DIFF TOXIN B  NEGATIVE for C difficle toxin by PCR            Last 24 Hours Medication List:     Current Facility-Administered Medications:  acetaminophen 650 mg Oral Q6H PRN Marcella Star, CRNP    atorvastatin 40 mg Oral Daily With [address] [facility] complex-vitamin C-folic acid 1 capsule Oral Daily With Ishaan Montero MD    bisacodyl 10 mg Rectal Daily PRN KATHY Medina    diltiazem 120 mg Oral Daily KATHY Sahu    heparin (porcine) 3-30 Units/kg/hr (Order-Specific) Intravenous Titrated Dipti Benes, PA-C Last Rate: 12 Units/kg/hr (03/07/19 5877)   heparin (porcine) 10,000 Units Intravenous PRN Dipti Benes, PA-C    heparin (porcine) 5,000 Units Intravenous PRN Dipti Benes, PA-C    hydrocortisone  Topical BID KATHY Medina    melatonin 6 mg Oral HS Leidy Martinez PA-C    metoprolol 5 mg Intravenous Q6H PRN Efren Jiang MD    metoprolol tartrate 50 mg Oral TID Dolores Leahy MD    midodrine 10 mg Oral Before Dialysis Dolores Leahy MD    nystatin  Topical BID KATHY Medina    omeprazole (PRILOSEC) suspension 2 mg/mL 20 mg Oral BID AC Kristal BondKATHY jacques    oxyCODONE 5 mg Oral Q4H PRN Efren Jiang MD    oxyCODONE 7 5 mg Oral Q4H PRN Efren Jiang MD    polyvinyl alcohol 1 drop Both Eyes Q3H PRN KATHY Medina    vancomycin 10 mg/kg (Adjusted) Intravenous Once per day on Tue Thu Sat Sharon Douglas MD           Time Spent for Care: 34 minutes  More than 50% of total time spent on counseling and coordination of care as described above  Current Length of Stay: 42 day(s)      Code Status: Level 1 - Full Code        ** Please Note: This note is constructed using a voice recognition dictation system  An occasional wrong word/phrase or sound-a-like substitution may have been picked up by dictation device due to the inherent limitations of voice recognition software  Read the chart carefully and recognize, using reasonable context, where substitutions may have occurred  **

## 2019-03-07 NOTE — PROGRESS NOTES
Patient was working with PT and fell  No head trauma per patient and PT  Patient complaints of Left knee and leg pain post fall  Abrasion noted  Dr Crys Perez aware and will assess patient  Xrays to be ordered

## 2019-03-07 NOTE — PROGRESS NOTES
NEPHROLOGY PROGRESS NOTE   Mervat Galloway 60 y o  male MRN: [id number]  Unit/Bed#: [facility] 834-01 Encounter: [id number]  Reason for Consult: PATRICK    ASSESSMENT AND PLAN:  Patient is a 50-year-old male with a history of hypertension/morbid obesity/bioprosthetic aortic valve replacement who presented with shock and AK I from [address]:  His course was complicated by MSSA bacteremia/AK I/GI bleeding/atrial fibrillation:  We are asked to follow for PATRICK on CKD now hemodialysis dependence after initial CRRT:     PATRICK, baseline serum creatinine 0 7 to 0 8  -PATRICK most likely secondary to ischemic/septic ATN in the setting of septic shock, another concern for AIN given peripheral eosinophilia/rash  -now remains dialysis dependent since 2/1/19 on IHD  Tolerated UF treatment yesterday well  HD today  Will keep patient on TTS schedule  Continue to monitor for renal recovery  Creatinine worsening off dialysis  -urine output remains minimal  -currently has PermCath  -recent urinalysis in January 2019 showed concentrated sample, greater than 3+ proteinuria, innumerable RBCs, positive nitrate  -CT scan last month showed no hydronephrosis      I saw and examined patient during hemodialysis treatment at 10:05 AM on 3/7/2019  The patient was receiving hemodialysis for treatment of PATRICK  I also reviewed vital signs, intake and output, lab results and recent events, and agree with dialysis order  Tolerating HD  BP acceptable  Time: 4 hours  Qb: 400  Sodium: 138  Dialyzer: F160  Qd: 1 5 times Qb  Potassium: as per protocol  Access: Perm cath  UF goal:  2 5 L as BP tolerated Bicarbonate: 35 Calcium 2 5    Volume overload/systolic CHF  -repeat echo showed EF 55%  -still seems volume overloaded, isolated ultrafiltration for UF removal as mentioned above   -post HD weight slowly reducing with last post HD weight 165 kg yesterday  today pre HD weight 155 kg bed scale? Accurate    2 5 L UF removal on dialysis today      [facility] bacteremia, CHON unremarkable  -currently remains on IV Ancef and IV vancomycin as per ID     Anemia, continue to closely monitor  Transfuse p r n  For hemoglobin less than seven   -if blood transfusion needed, would recommend all transplant precautions with leuko reduced, CMV-negative, irradiated etc   -check iron studies once infection issues are resolved      Borderline hyponatremia, serum sodium 135  Continue to monitor with dialysis      Peripheral eosinophilia/rash  -still continued to have peripheral eosinophilia  Concern for Amiodarone related versus antibiotic  Currently remains on Ancef for MSSA bacteremia   -continue to closely monitor   -rash slowly improving   -need to continue PPI due to recent GI bleed issues      ?  Myositis left upper extremity  Status post CT with IV contrast (on 3/5/19):? Necrotizing fasciitis with nonspecific cellulitis and myositis  surgery follow-up, no intervention planned  -GI bleeding:  Secondary to pyloric junction ulcer status post EGD x2; status post injection and clipping  Currently off Carafate   -acute hypoxic respiratory failure improved   Challenge estimated dry weight   -stress cardiomyopathy in the setting of bacteremia   Resolved based on last echo  -Atrial flutter:  Per primary service/Cardiology  Left IJ thrombus, on heparin drip    SUBJECTIVE:  Patient seen and examined at bedside  He has been itching and will give Benadryl one time on dialysis  No chest pain, shortness of breath, nausea, vomiting, abdominal pain    Urine output remains poor    OBJECTIVE:  Current Weight: Weight - Scale: (!) 164 kg (361 lb 1 8 oz)  Vitals:    03/07/19 1000   BP: 115/54   Pulse: 81   Resp:    Temp:    SpO2:        Intake/Output Summary (Last 24 hours) at 3/7/2019 1003  Last data filed at 3/7/2019 0926  Gross per 24 hour   Intake 1225 6 ml   Output 2500 ml   Net -1274 4 ml       Physical Examination:  General:  Lying in bed, no acute distress   Eyes:  Mild conjunctival pallor present  ENT:  External examination of ears and nose unremarkable  Neck:  Supple  Respiratory:  Bilateral decreased breath sound at bases  CVS:  S1, S2 present  GI:  Soft, nontender, nondistended  CNS:  Active alert oriented x2  Extremities:  2+ edema in legs  Skin:  Peripheral rash slowly improving    Medications:    Current Facility-Administered Medications:     acetaminophen (TYLENOL) tablet 650 mg, 650 mg, Oral, Q6H PRN, Marcella Star, CRNP, 650 mg at 03/05/19 0948    atorvastatin (LIPITOR) tablet 40 mg, 40 mg, Oral, Daily With Dinner, Marcella Jackson, CRNP, 40 mg at 03/06/19 1714    b complex-vitamin C-folic acid (NEPHROCAPS) capsule 1 capsule, 1 capsule, Oral, Daily With Dinner, Alejandro Hedrick MD, 1 capsule at 03/06/19 1714    bisacodyl (DULCOLAX) rectal suppository 10 mg, 10 mg, Rectal, Daily PRN, Marcella Star, CRNP    diltiazem (CARDIZEM) tablet 30 mg, 30 mg, Oral, Q6H [address], Alejandro Hedrick MD, 30 mg at 03/06/19 1715    diphenhydrAMINE (BENADRYL) injection 12 5 mg, 12 5 mg, Intravenous, Once, Li Ny MD    heparin (porcine) 25,000 units in 250 mL infusion (premix), 3-30 Units/kg/hr (Order-Specific), Intravenous, Titrated, CECILE Ocampo-ANA, Last Rate: 12 9 mL/hr at 03/07/19 0700, 8 Units/kg/hr at 03/07/19 0700    heparin (porcine) injection 10,000 Units, 10,000 Units, Intravenous, PRN, Eduar Razo PA-C    heparin (porcine) injection 5,000 Units, 5,000 Units, Intravenous, PRN, Eduar Razo PA-C    hydrocortisone 1 % cream, , Topical, BID, KATHY Lugo    melatonin tablet 6 mg, 6 mg, Oral, HS, Leidy Martinez PA-C, 6 mg at 03/06/19 2144    metoprolol (LOPRESSOR) injection 5 mg, 5 mg, Intravenous, Q6H PRN, Franne Nyhan, MD, 5 mg at 03/02/19 1150    metoprolol tartrate (LOPRESSOR) tablet 50 mg, 50 mg, Oral, TID, Alejandro Hedrick MD, 50 mg at 03/06/19 1714    midodrine (PROAMATINE) tablet 10 mg, 10 mg, Oral, Before Dialysis, Alejandro Hedrick MD, 10 mg at 03/07/19 4814    nystatin (MYCOSTATIN) powder, , Topical, BID, Kristal Bond, CRNP    omeprazole (PRILOSEC) suspension 2 mg/mL, 20 mg, Oral, BID AC, Kristal Bond, CRNP, 20 mg at 03/06/19 0547    oxyCODONE (ROXICODONE) IR tablet 5 mg, 5 mg, Oral, Q4H PRN, Nolvia Grewal MD, 5 mg at 03/06/19 0737    oxyCODONE (ROXICODONE) IR tablet 7 5 mg, 7 5 mg, Oral, Q4H PRN, Nolvia Grewal MD, 7 5 mg at 03/03/19 1600    polyvinyl alcohol (LIQUIFILM TEARS) 1 4 % ophthalmic solution 1 drop, 1 drop, Both Eyes, Q3H PRN, KATHY Malhotra, 1 drop at 03/05/19 0603    vancomycin (VANCOCIN) IVPB (premix) 750 mg, 10 mg/kg (Adjusted), Intravenous, Once per day on Tue Thu Sat, Alyssa Carey MD, Last Rate: 150 mL/hr at 03/05/19 1422, 750 mg at 03/05/19 1422    Laboratory Results:  Results from last 7 days   Lab Units 03/07/19  0505 03/07/19  0500 03/06/19  2129 03/06/19  1309 03/06/19  0553 03/06/19  0552 03/05/19  1923 03/05/19  1117 03/05/19  6476  03/04/19  0449  03/03/19  0624  03/02/19  0617  03/01/19  0618   WBC Thousand/uL  --   --   --   --  12 11*  --   --   --  10 87*  --  13 09*  --  12 71*  --  12 68*  --  12 78*   HEMOGLOBIN g/dL 8 3*  --  8 6* 8 1* 8 6*  --  8 9* 7 9* 9 1*   < > 7 2*   < > 7 4*   < > 7 5*   < > 8 2*   HEMATOCRIT % 26 9*  --  28 3* 26 8* 27 0*  --  28 7* 25 4* 29 5*   < > 23 9*   < > 24 1*   < > 25 1*   < > 26 8*   PLATELETS Thousands/uL  --   --   --   --  175  --   --   --  138*  --  157  --  141*  --  152  --  143*   POTASSIUM mmol/L  --  3 7  --   --   --  3 5  --   --  4 0  --  3 4*  --  3 2*  --  3 1*  --  3 2*   CHLORIDE mmol/L  --  101  --   --   --  101  --   --  101  --  101  --  102  --  104  --  106   CO2 mmol/L  --  26  --   --   --  28  --   --  24  --  27  --  29  --  27  --  29   BUN mg/dL  --  31*  --   --   --  23  --   --  35*  --  29*  --  21  --  22  --  14   CREATININE mg/dL  --  3 91*  --   --   --  3 19*  --   --  4 39*  --  3 57*  --  2 87*  --  3 02*  --  2 19*   CALCIUM mg/dL  -- 7 2*  --   --   --  7 0*  --   --  7 1*  --  6 9*  --  6 8*  --  7 0*  --  7 1*   MAGNESIUM mg/dL  --  1 8  --   --   --  1 8  --   --  1 9  --  1 9  --  1 8  --  1 9  --  2 0   PHOSPHORUS mg/dL  --   --   --   --   --   --   --   --  4 3  --  3 5  --  3 0  --  2 8  --  2 4*    < > = values in this interval not displayed  Results for orders placed during the hospital encounter of 01/24/19   XR chest portable    Narrative CHEST     INDICATION:   ETT placement  COMPARISON:  2/21/2019    EXAM PERFORMED/VIEWS:  XR CHEST PORTABLE      FINDINGS:      There has been interval placement of an endotracheal tube which projects approximately 3 cm above the raad  Lines and tubes are otherwise unchanged from prior exam     Heart shadow is enlarged but unchanged from prior exam     There is pulmonary vascular congestion which appears unchanged from the prior study  Osseous structures appear within normal limits for patient age  Impression Endotracheal tube in appropriate position  Cardiomegaly with stable mild pulmonary vascular congestion  Workstation performed: [id number]       No results found for this or any previous visit  No results found for this or any previous visit  No results found for this or any previous visit  No results found for this or any previous visit  No results found for this or any previous visit  Portions of the record may have been created with voice recognition software  Occasional wrong word or "sound a like" substitutions may have occurred due to the inherent limitations of voice recognition software  Read the chart carefully and recognize, using context, where substitutions have occurred

## 2019-03-08 LAB
ANION GAP SERPL CALCULATED.3IONS-SCNC: 6 MMOL/L (ref 4–13)
APTT PPP: 111 SECONDS (ref 26–38)
APTT PPP: 57 SECONDS (ref 26–38)
APTT PPP: >210 SECONDS (ref 26–38)
BASOPHILS # BLD AUTO: 0.1 THOUSANDS/ΜL (ref 0–0.1)
BASOPHILS NFR BLD AUTO: 1 % (ref 0–1)
BUN SERPL-MCNC: 24 MG/DL (ref 5–25)
CALCIUM SERPL-MCNC: 7.1 MG/DL (ref 8.3–10.1)
CHLORIDE SERPL-SCNC: 98 MMOL/L (ref 100–108)
CO2 SERPL-SCNC: 31 MMOL/L (ref 21–32)
CREAT SERPL-MCNC: 3.34 MG/DL (ref 0.6–1.3)
EOSINOPHIL # BLD AUTO: 2.45 THOUSAND/ΜL (ref 0–0.61)
EOSINOPHIL NFR BLD AUTO: 19 % (ref 0–6)
ERYTHROCYTE [DISTWIDTH] IN BLOOD BY AUTOMATED COUNT: 15.9 % (ref 11.6–15.1)
GFR SERPL CREATININE-BSD FRML MDRD: 19 ML/MIN/1.73SQ M
GLUCOSE SERPL-MCNC: 78 MG/DL (ref 65–140)
HCT VFR BLD AUTO: 30.4 % (ref 36.5–49.3)
HCT VFR BLD AUTO: 31.1 % (ref 36.5–49.3)
HGB BLD-MCNC: 9.3 G/DL (ref 12–17)
HGB BLD-MCNC: 9.6 G/DL (ref 12–17)
IMM GRANULOCYTES # BLD AUTO: 0.25 THOUSAND/UL (ref 0–0.2)
IMM GRANULOCYTES NFR BLD AUTO: 2 % (ref 0–2)
LYMPHOCYTES # BLD AUTO: 0.8 THOUSANDS/ΜL (ref 0.6–4.47)
LYMPHOCYTES NFR BLD AUTO: 6 % (ref 14–44)
MAGNESIUM SERPL-MCNC: 1.8 MG/DL (ref 1.6–2.6)
MCH RBC QN AUTO: 29.6 PG (ref 26.8–34.3)
MCHC RBC AUTO-ENTMCNC: 30.6 G/DL (ref 31.4–37.4)
MCV RBC AUTO: 97 FL (ref 82–98)
MONOCYTES # BLD AUTO: 0.88 THOUSAND/ΜL (ref 0.17–1.22)
MONOCYTES NFR BLD AUTO: 7 % (ref 4–12)
NEUTROPHILS # BLD AUTO: 8.43 THOUSANDS/ΜL (ref 1.85–7.62)
NEUTS SEG NFR BLD AUTO: 65 % (ref 43–75)
NRBC BLD AUTO-RTO: 0 /100 WBCS
PLATELET # BLD AUTO: 173 THOUSANDS/UL (ref 149–390)
PMV BLD AUTO: 11.6 FL (ref 8.9–12.7)
POTASSIUM SERPL-SCNC: 3.5 MMOL/L (ref 3.5–5.3)
RBC # BLD AUTO: 3.14 MILLION/UL (ref 3.88–5.62)
SODIUM SERPL-SCNC: 135 MMOL/L (ref 136–145)
WBC # BLD AUTO: 12.91 THOUSAND/UL (ref 4.31–10.16)

## 2019-03-08 PROCEDURE — 85018 HEMOGLOBIN: CPT | Performed by: PHYSICIAN ASSISTANT

## 2019-03-08 PROCEDURE — 97530 THERAPEUTIC ACTIVITIES: CPT

## 2019-03-08 PROCEDURE — 97112 NEUROMUSCULAR REEDUCATION: CPT

## 2019-03-08 PROCEDURE — 85730 THROMBOPLASTIN TIME PARTIAL: CPT | Performed by: SURGERY

## 2019-03-08 PROCEDURE — 99233 SBSQ HOSP IP/OBS HIGH 50: CPT | Performed by: INTERNAL MEDICINE

## 2019-03-08 PROCEDURE — 80048 BASIC METABOLIC PNL TOTAL CA: CPT | Performed by: INTERNAL MEDICINE

## 2019-03-08 PROCEDURE — 83735 ASSAY OF MAGNESIUM: CPT | Performed by: INTERNAL MEDICINE

## 2019-03-08 PROCEDURE — 97535 SELF CARE MNGMENT TRAINING: CPT

## 2019-03-08 PROCEDURE — 99232 SBSQ HOSP IP/OBS MODERATE 35: CPT | Performed by: INTERNAL MEDICINE

## 2019-03-08 PROCEDURE — 85730 THROMBOPLASTIN TIME PARTIAL: CPT | Performed by: PHYSICIAN ASSISTANT

## 2019-03-08 PROCEDURE — 94762 N-INVAS EAR/PLS OXIMTRY CONT: CPT

## 2019-03-08 PROCEDURE — 85730 THROMBOPLASTIN TIME PARTIAL: CPT | Performed by: INTERNAL MEDICINE

## 2019-03-08 PROCEDURE — 85025 COMPLETE CBC W/AUTO DIFF WBC: CPT | Performed by: INTERNAL MEDICINE

## 2019-03-08 PROCEDURE — 97127 HB COGNITIVE SKILLS DEVELOPMENT, EACH 15 MUNUTES: CPT

## 2019-03-08 PROCEDURE — 85014 HEMATOCRIT: CPT | Performed by: PHYSICIAN ASSISTANT

## 2019-03-08 PROCEDURE — G0515 COGNITIVE SKILLS DEVELOPMENT: HCPCS

## 2019-03-08 RX ORDER — POTASSIUM CHLORIDE 20 MEQ/1
40 TABLET, EXTENDED RELEASE ORAL ONCE
Status: COMPLETED | OUTPATIENT
Start: 2019-03-08 | End: 2019-03-08

## 2019-03-08 RX ADMIN — NYSTATIN: 100000 POWDER TOPICAL at 18:19

## 2019-03-08 RX ADMIN — HEPARIN SODIUM 6 UNITS/KG/HR: 10000 INJECTION, SOLUTION INTRAVENOUS at 15:46

## 2019-03-08 RX ADMIN — DILTIAZEM HYDROCHLORIDE 120 MG: 120 CAPSULE, COATED, EXTENDED RELEASE ORAL at 18:10

## 2019-03-08 RX ADMIN — Medication 1 CAPSULE: at 15:58

## 2019-03-08 RX ADMIN — Medication 20 MG: at 15:59

## 2019-03-08 RX ADMIN — NYSTATIN: 100000 POWDER TOPICAL at 09:32

## 2019-03-08 RX ADMIN — Medication 400 MG: at 15:57

## 2019-03-08 RX ADMIN — OXYCODONE HYDROCHLORIDE 7.5 MG: 5 TABLET ORAL at 02:46

## 2019-03-08 RX ADMIN — OXYCODONE HYDROCHLORIDE 5 MG: 5 TABLET ORAL at 20:08

## 2019-03-08 RX ADMIN — POTASSIUM CHLORIDE 40 MEQ: 1500 TABLET, EXTENDED RELEASE ORAL at 15:58

## 2019-03-08 RX ADMIN — METOPROLOL TARTRATE 50 MG: 50 TABLET, FILM COATED ORAL at 09:31

## 2019-03-08 RX ADMIN — OXYCODONE HYDROCHLORIDE 5 MG: 5 TABLET ORAL at 09:31

## 2019-03-08 RX ADMIN — HEPARIN SODIUM 5000 UNITS: 1000 INJECTION INTRAVENOUS; SUBCUTANEOUS at 20:11

## 2019-03-08 RX ADMIN — METOPROLOL TARTRATE 50 MG: 50 TABLET, FILM COATED ORAL at 15:58

## 2019-03-08 RX ADMIN — ACETAMINOPHEN 650 MG: 325 TABLET, FILM COATED ORAL at 10:44

## 2019-03-08 RX ADMIN — METOPROLOL TARTRATE 50 MG: 50 TABLET, FILM COATED ORAL at 22:06

## 2019-03-08 RX ADMIN — MELATONIN 6 MG: at 22:06

## 2019-03-08 RX ADMIN — Medication 20 MG: at 06:23

## 2019-03-08 RX ADMIN — HYDROCORTISONE: 1 CREAM TOPICAL at 09:32

## 2019-03-08 RX ADMIN — ATORVASTATIN CALCIUM 40 MG: 40 TABLET, FILM COATED ORAL at 15:58

## 2019-03-08 RX ADMIN — HYDROCORTISONE: 1 CREAM TOPICAL at 18:19

## 2019-03-08 NOTE — PROGRESS NOTES
[facility]ist Service - Internal Medicine Progress Note       PATIENT INFORMATION      Patient: Brennon Baptiste 60 y o  male   MRN: [id number]  PCP: Author Seferino DO  Unit/Bed#: [facility] 834-01 Encounter: [id number]  Date Of Visit: 03/08/19       ASSESSMENTS & PLAN       MSSA bacteremia - Resolved septic shock  Assessment & Plan  - CHON negative for endocarditis  - antibiotic regimen per Infectious Disease to complete at least a six week course    Cellulitis/myositis of left forearm  Assessment & Plan  - appreciate Infectious Disease input - IV Vancomycin switched to IV Teflaro due to previous rash with associated eosinophilia  - CT of forearm on 3/5 noted per radiologist to be concerning and read as follows: "Extensive subcutaneous edema throughout the forearm with associated radial muscle edema concerning for nonspecific cellulitis and myositis  Additionally, there is deep fascial edema noted along the radial soft tissues of the proximal forearm raising suspicion for necrotizing fasciitis without onelia soft tissue emphysema at this time  No drainable abscess collection    No underlying osseous destructive changes to indicate osteomyelitis "  - CPK normalized previously - appreciate general surgery who did not feel this is necrotizing fasciitis and recommended a repeat LUE venous doppler to assess progression of DVT which revealed no significant change from prior study with active presence of nonocclusive left IJ DVT along with nonocclusive superficial thrombophlebitis from the cephalic vein down to the antecubital fossa   - continue PRN pain control     Acute renal failure  Assessment & Plan  - likely secondary to recent septic shock  - continue hemodialysis per nephrology who did not feel renal recovery will occur in the near future  - limit/avoid nephrotoxins as possible - home Demadex held    - continue Nephrocaps    Left internal jugular vein thrombus  Assessment & Plan  - remains on a heparin drip  - PRN pain control   - no significant changes on repeat doppler imaging study      Atrial flutter/fibrillation - Stress-induced cardiomyopathy   Assessment & Plan  - continue Cardizem (switched to long-acting) and Lopressor regimen per cardiology - monitor heart rates specially while undergoing hemodialysis  - currently anticoagulated on heparin drip for a coexisting venous thrombus - cardiology however recommending no chronic anticoagulation and stated he may be a candidate for a Watchman device in the future if medically stable   - monitor/replete electrolytes as necessary - hypokalemia resolved  - EF improved from 30 -> 55% on repeat limited echocardiogram likely from recovering septic shock    Acute blood loss anemia - Gastric ulcer  Assessment & Plan  - s/p injection/clipping  - c/w PPI regimen  - H/H stable    Morbid obesity   Assessment & Plan  - BMI of 52 42  - lifestyle/diet modifications    Acute metabolic encephalopathy  Assessment & Plan  - waxing/waning - likely secondary to recovered septic shock in the setting of multiple medical issues  - clinical monitoring      Essential hypertension  Assessment & Plan  - continue Lopressor/Cardizem    History of aortic valve replacement with bioprosthetic valve  Assessment & Plan  - cardiology following  - no evidence of endocarditis on echocardiogram      VTE Prophylaxis:  Heparin drip       SUBJECTIVE     Seen/examined earlier today with nursing during rounds  He is in a more pleasant demeanor today  No new complaints at this time other than chronic pains  Denies fever/chills currently  OBJECTIVE     Vitals:   Temp (24hrs), Av 3 °F (36 8 °C), Min:97 9 °F (36 6 °C), Max:99 3 °F (37 4 °C)    Temp:  [97 9 °F (36 6 °C)-99 3 °F (37 4 °C)] 98 2 °F (36 8 °C)  HR:  [72-79] 74  Resp:  [20] 20  BP: (100-128)/(52-63) 107/58  SpO2:  [90 %-98 %] 95 %  Body mass index is 55 25 kg/m²  Input and Output Summary (last 24 hours):        Intake/Output Summary (Last 24 hours) at 3/8/2019 1737  Last data filed at 3/8/2019 1020  Gross per 24 hour   Intake 365 33 ml   Output    Net 365 33 ml       Physical Exam:     GENERAL:  Obese - weak/fatigued  HEAD:  Normocephalic - atraumatic  EYES: PERRL - EOMI   MOUTH:  Mucosa moist  NECK:  Supple - full range of motion  CARDIAC:  Regular rate/rhythm currently - S1/S2 positive  PULMONARY:  Diminished to auscultation bilaterally  ABDOMEN:  Soft - nontender/nondistended - active bowel sounds - anasarca noted   MUSCULOSKELETAL:  Motor strength/range of motion markedly deconditioned - LE and UE pitting edema noted   NEUROLOGIC:  Awake/alert   SKIN:  Left forearm erythema present - chronic wrinkles/blemishes   PSYCHIATRIC:  Mood/affect flat      ADDITIONAL DATA       Labs & Recent Cultures:     Results from last 7 days   Lab Units 03/08/19  0922 03/08/19  0430   WBC Thousand/uL  --  12 91*   HEMOGLOBIN g/dL 9 6* 9 3*   HEMATOCRIT % 31 1* 30 4*   PLATELETS Thousands/uL  --  173   NEUTROS PCT %  --  65   LYMPHS PCT %  --  6*   MONOS PCT %  --  7   EOS PCT %  --  19*     Results from last 7 days   Lab Units 03/08/19  0430  03/04/19  0449   SODIUM mmol/L 135*   < > 136   POTASSIUM mmol/L 3 5   < > 3 4*   CHLORIDE mmol/L 98*   < > 101   CO2 mmol/L 31   < > 27   BUN mg/dL 24   < > 29*   CREATININE mg/dL 3 34*   < > 3 57*   CALCIUM mg/dL 7 1*   < > 6 9*   ALK PHOS U/L  --   --  78   ALT U/L  --   --  <6*   AST U/L  --   --  10    < > = values in this interval not displayed                       Last 24 Hours Medication List:     Current Facility-Administered Medications:  acetaminophen 650 mg Oral Q6H PRN KATHY Haines    atorvastatin 40 mg Oral Daily With KATHY Baker    b complex-vitamin C-folic acid 1 capsule Oral Daily With Laura Noguera MD    bisacodyl 10 mg Rectal Daily PRN KATHY Haines    [START ON 3/9/2019] ceftaroline (TEFLARO)  mg Intravenous Q12H Tayler Salinas MD    diltiazem 120 mg Oral Daily KATHY Fletcher    heparin (porcine) 3-30 Units/kg/hr (Order-Specific) Intravenous Titrated CECILE Vargas-ANA Last Rate: 6 Units/kg/hr (03/08/19 1546)   heparin (porcine) 10,000 Units Intravenous PRN Anitha Alvarado, PA-C    heparin (porcine) 5,000 Units Intravenous PRN Anitha Alvarado, PA-C    hydrocortisone  Topical BID KATHY Sainz    magnesium oxide 400 mg Oral Daily Carlos Alexander MD    melatonin 6 mg Oral HS Leidy Martinez PA-C    metoprolol 5 mg Intravenous Q6H PRN Jose Alfredo Ward MD    metoprolol tartrate 50 mg Oral TID Carlos Alexander MD    midodrine 10 mg Oral Before Dialysis Carlos Alexander MD    nystatin  Topical BID KATHY Sainz    omeprazole (PRILOSEC) suspension 2 mg/mL 20 mg Oral BID AC Krsital BondKATHY    oxyCODONE 5 mg Oral Q4H PRN Jose Alfredo Ward MD    oxyCODONE 7 5 mg Oral Q4H PRN Jose Alfredo Ward MD    polyvinyl alcohol 1 drop Both Eyes Q3H PRN KATHY Sainz           Time Spent for Care: 33 minutes  More than 50% of total time spent on counseling and coordination of care as described above  Current Length of Stay: 43 day(s)      Code Status: Level 1 - Full Code        ** Please Note: This note is constructed using a voice recognition dictation system  An occasional wrong word/phrase or sound-a-like substitution may have been picked up by dictation device due to the inherent limitations of voice recognition software  Read the chart carefully and recognize, using reasonable context, where substitutions may have occurred  **

## 2019-03-08 NOTE — PROGRESS NOTES
Progress Note - Nephrology   Jenifer Mon 60 y o  male MRN: [id number]  Unit/Bed#: [facility] 834-01 Encounter: [id number]    ASSESSMENT AND PLAN:  Patient is a 50-year-old male with a history of hypertension/morbid obesity/bioprosthetic aortic valve replacement who presented with shock and AK I from [address]:  His course was complicated by MSSA bacteremia/AK I/GI bleeding/atrial fibrillation: We are asked to follow for PATRICK on CKD now hemodialysis dependence after initial CRRT:     1  AK I secondary to ATN in the setting of septic shock  There is some question of AIN with eosinophilia  Unfortunately we have not been able to get a urine specimen for urine free eosinophils  He also requires antibiotics for his infection along with a proton pump inhibitor because of his GI bleeding  Again initially was on CVVH and transition to IHD on 02/01/2019  CURRENTLY HD DEPENDENT, NO SIGNS OF RECOVERY  -HD in a m  although the patient remains volume overloaded the patient would like to have a day off and then rechallenge in a m   Since there are no signs of heart failure but just has chronic anasarca I comfortable with this especially because his labs look quite good as well   -challenge estimated dry weight as patient has massive anasarca and no response diuretics  We will accept some tachycardia with the atrial fib or atrial flutter as long as he is asymptomatic and his blood pressures in the 90s or higher    -access:  Right IJ permanent Deangelo catheter     2  Volume:  Significant anasarca  Challenge with hemodialysis/ultrafiltration, please see above     3  Hypotension: This is in part chronic in part occasionally slightly lower with the atrial fibrillation/atrial flutter   -patient now on long-acting diltiazem so will not be held and hopefully will help his rhythm  -we can accept some tachycardia as long as he is asymptomatic  This was discussed with Cardiology as well as the hemodialysis nurses      4  Electrolytes:  -hypokalemia:  Replete     5  Mineral bone disorder:  -phosphorus has been acceptable  -replete borderline hypomagnesemia you tried     6  Anemia:  -hemoglobin acceptable at 9 6  -transfuse as needed for hemoglobin less than 7     7  Other issues:  -ID:  MSSA bacteremia per Infectious Disease  This may been related to endocarditis though CHON demonstrated no valvular vegetations; initial and treat may been related to multiple upper back wounds  On vancomycin per Infectious Disease    -rash:  Improved as well as eosinophilia thought related to amiodarone    -? Myositis/necrotizing lesion in the left forearm:  Now followed by surgery and Infectious Disease currently just being treated with antibiotics     -GI bleeding:  Secondary to pyloric junction ulcer status post EGD x2; status post injection and clipping     -acute hypoxic respiratory failure improved  Challenge estimated dry weight     -stress cardiomyopathy in the setting of bacteremia  Resolved based on last echo     -a flutter:  Per primary service/Cardiology     -aortic valve replacement    -nonocclusive DVT secondary to left IJ permanent Deangelo catheter               Subjective:   Patient has had ongoing problems with hemodialysis where he becomes tachycardic and drops his blood pressure with difficulty removing fluid  He currently feels improved today no shortness of breath, no chest pain  No nausea vomiting or diarrhea  Minimal to no urine output  Objective:     Vitals: Blood pressure 101/58, pulse 74, temperature 99 3 °F (37 4 °C), temperature source Oral, resp  rate 20, height 5' 9" (1 753 m), weight (!) 170 kg (374 lb 1 9 oz), SpO2 90 %  ,Body mass index is 55 25 kg/m²      Weight (last 2 days)     Date/Time   Weight    03/08/19 0600   170 (374 12)  (Abnormal)     03/07/19 0600   164 (361 11)  (Abnormal)     03/06/19 0600   167 (368 17)  (Abnormal)                 Intake/Output Summary (Last 24 hours) at 3/8/2019 1134  Last data filed at 3/8/2019 0607  Gross per 24 hour   Intake 973 33 ml   Output 1810 ml   Net -836 67 ml       Permanent HD Catheter  (Active)   Reasons to continue HD Cath Treatment Therapy 2/26/2019  9:03 AM   Line Necessity Reviewed Yes, reviewed with provider 3/7/2019 12:00 PM   Site Assessment Clean;Dry; Intact 3/8/2019  1:05 AM   Proximal Lumen (Red Port-PICC only) Status Capped 3/8/2019  1:05 AM   Medial Lumen (Purple/White Port-PICC only) Status Capped 3/6/2019  3:00 AM   Distal Lumen (Escobar Port-PICC only) Status Capped 3/8/2019  1:05 AM   Dressing Type Chlorhexidine dressing 3/8/2019  1:05 AM   Dressing Status Clean;Dry; Intact 3/8/2019  1:05 AM   Dressing Intervention Dressing changed 3/8/2019  6:36 AM   Dressing Change Due 03/15/19 3/8/2019  6:36 AM       Physical Exam: General:  Morbidly obese, no acute distress undergoing physical therapy  Skin:  No acute rash  Eyes:  No scleral icterus  ENT:  Moist mucous membranes  Neck:  Supple, no jugular venous distention  Chest:  Clear to auscultation but poor effort  CVS:  No rubs or gallops appreciable  Abdomen:   Morbidly obese, soft and nontender with normal bowel sounds  Extremities:  Anasarca is persistent without significant change  Neuro:  No gross focality  Psych:  Alert and oriented                Medications:    Scheduled Meds:  Current Facility-Administered Medications:  acetaminophen 650 mg Oral Q6H PRN KATHY Shepard    atorvastatin 40 mg Oral Daily With KATHY Baker    b complex-vitamin C-folic acid 1 capsule Oral Daily With Jose Roberto Barnes MD    bisacodyl 10 mg Rectal Daily PRN KATHY Shepard    diltiazem 120 mg Oral Daily KATHY Hector    heparin (porcine) 3-30 Units/kg/hr (Order-Specific) Intravenous Titrated Stoney Paddock, PA-C Last Rate: 9 Units/kg/hr (03/08/19 0320)   heparin (porcine) 10,000 Units Intravenous PRN Stoney Paddock, PA-C    heparin (porcine) 5,000 Units Intravenous PRN Stoney Paddock, PA-C hydrocortisone  Topical BID KATHY Pro    melatonin 6 mg Oral HS Leidy Martinez PA-C    metoprolol 5 mg Intravenous Q6H PRN Batsheva Kenney MD    metoprolol tartrate 50 mg Oral TID Beatris Hatchet, MD    midodrine 10 mg Oral Before Dialysis Beatris Hatchet, MD    nystatin  Topical BID KATHY Pro    omeprazole (PRILOSEC) suspension 2 mg/mL 20 mg Oral BID AC Kristal BondKATHY    oxyCODONE 5 mg Oral Q4H PRN Batsheva Kenney MD    oxyCODONE 7 5 mg Oral Q4H PRN Batsheva Kenney MD    polyvinyl alcohol 1 drop Both Eyes Q3H PRN KATHY Pro    vancomycin 10 mg/kg (Adjusted) Intravenous Once per day on Tue Thu Sat Chloe Garcia MD        PRN Meds:   acetaminophen    bisacodyl    heparin (porcine)    heparin (porcine)    metoprolol    midodrine    oxyCODONE    oxyCODONE    polyvinyl alcohol    Continuous Infusions:  heparin (porcine) 3-30 Units/kg/hr (Order-Specific) Last Rate: 9 Units/kg/hr (03/08/19 0320)       Lab, Imaging and other studies: I have personally reviewed pertinent labs    Laboratory Results:  Results from last 7 days   Lab Units 03/08/19  0922 03/08/19  0430 03/07/19  2138 03/07/19  0505 03/07/19  0500 03/06/19  2129 03/06/19  1309 03/06/19  3920 03/06/19  0655  03/05/19  5270  03/04/19  0449  03/03/19  0624  03/02/19  0617   WBC Thousand/uL  --  12 91*  --   --   --   --   --  12 11*  --   --  10 87*  --  13 09*  --  12 71*  --  12 68*   HEMOGLOBIN g/dL 9 6* 9 3* 9 1* 8 3*  --  8 6* 8 1* 8 6*  --    < > 9 1*   < > 7 2*   < > 7 4*   < > 7 5*   HEMATOCRIT % 31 1* 30 4* 29 6* 26 9*  --  28 3* 26 8* 27 0*  --    < > 29 5*   < > 23 9*   < > 24 1*   < > 25 1*   PLATELETS Thousands/uL  --  173  --   --   --   --   --  175  --   --  138*  --  157  --  141*  --  152   POTASSIUM mmol/L  --  3 5  --   --  3 7  --   --   --  3 5  --  4 0  --  3 4*  --  3 2*  --  3 1*   CHLORIDE mmol/L  --  98*  --   --  101  --   --   --  101  --  101  --  101  --  102  --  104   CO2 mmol/L  --  31  -- --  26  --   --   --  28  --  24  --  27  --  29  --  27   BUN mg/dL  --  24  --   --  31*  --   --   --  23  --  35*  --  29*  --  21  --  22   CREATININE mg/dL  --  3 34*  --   --  3 91*  --   --   --  3 19*  --  4 39*  --  3 57*  --  2 87*  --  3 02*   CALCIUM mg/dL  --  7 1*  --   --  7 2*  --   --   --  7 0*  --  7 1*  --  6 9*  --  6 8*  --  7 0*   MAGNESIUM mg/dL  --  1 8  --   --  1 8  --   --   --  1 8  --  1 9  --  1 9  --  1 8  --  1 9   PHOSPHORUS mg/dL  --   --   --   --   --   --   --   --   --   --  4 3  --  3 5  --  3 0  --  2 8    < > = values in this interval not displayed  Urinalysis: Lab Results   Component Value Date    COLORU Neda 01/23/2019    COLORU Yellow 10/30/2014    CLARITYU Cloudy 01/23/2019    CLARITYU Clear 10/30/2014    SPECGRAV 1 025 01/23/2019    SPECGRAV 1 009 10/30/2014    PHUR 5 5 01/23/2019    PHUR 5 0 10/30/2014    LEUKOCYTESUR Negative 01/23/2019    LEUKOCYTESUR Negative 10/30/2014    NITRITE Positive (A) 01/23/2019    NITRITE Negative 10/30/2014    PROTEINUA Negative 10/30/2014    GLUCOSEU Negative 01/23/2019    GLUCOSEU Negative 10/30/2014    KETONESU Negative 01/23/2019    KETONESU Negative 10/30/2014    BILIRUBINUR Interference- unable to analyze (A) 01/23/2019    BILIRUBINUR Negative 10/30/2014    BLOODU Large (A) 01/23/2019    BLOODU Negative 10/30/2014     ABGs:   Lab Results   Component Value Date    PH 7 346 (L) 02/21/2019     Radiology review:     Portions of the record may have been created with voice recognition software   Occasional wrong word or "sound a like" substitutions may have occurred due to the inherent limitations of voice recognition software   Read the chart carefully and recognize, using context, where substitutions have occurred

## 2019-03-08 NOTE — PROGRESS NOTES
General Cardiology   Progress Note -  Team One   Yulissa Mayers 60 y o  male MRN: [id number]    Unit/Bed#: [facility] 834-01 Encounter: [id number]    Assessment/ Plan    Paroxysmal atrial fibrillation/flutter  Switched to diltiazem  mg daily yesterday evening, remains in Aflutter  Rates controlled overnight  Will have to monitor while at dialysis tomorrow since that is typically when his rates increase  Pt will not require long term AC from Afib standpoint due to significant GI bleeding on warfarin the past   Currently on IV heparin for IJ thrombus, hgb stable      IJ thrombus  Significant left upper extremity swelling  Continues on heparin gtt, repeat doppler studies revealed no significant change  Pain control per primary team      Stress cardiomyopathy, with recovered EF 55%  Prior EF 30-35% in setting of sepsis/bacteremia  Repeat limited echo 2/19/19 showed recovery of EF 55%      Acute kidney injury  Requiring CVVH and now intermittent HD (Tues/Thurs/Sat)  He continues to have episodes of Afib with RVR during HD  Will monitor rates tomorrow during HD with switch to diltiazem CD        Chronic LBBB  Normal catheterization 2014     Hx  Of aortic stenosis s/p bioprosthetic FUR [id number]  No vegetation on CHON 1/25/19  Increased transaortic velocity due to valvular stenosis, mean gradient 46 4 mmHg  +systolic murmur on exam      Hypertension- 24 hr avg 109/56  Occasional hypotension with SBP into 90s, patient asymptomatic      MSSA bacteremia/LUE cellulitis  On IV Vancomycin; blood cultures 1/27/19 without growth      Subjective  Review of Systems   Constitution: Negative for chills, malaise/fatigue and weight gain  Cardiovascular: Positive for leg swelling  Negative for chest pain, dyspnea on exertion, orthopnea, palpitations and syncope  Respiratory: Negative for cough, shortness of breath, sleep disturbances due to breathing and sputum production  Endocrine: Negative      Musculoskeletal: Positive for back pain and joint pain (Left knee)  Gastrointestinal: Negative for bloating, nausea and vomiting  Neurological: Negative for dizziness, light-headedness and weakness  Psychiatric/Behavioral: Positive for depression  Negative for altered mental status  All other systems reviewed and are negative  Objective:   Vitals: Blood pressure 108/59, pulse 72, temperature 97 9 °F (36 6 °C), temperature source Oral, resp  rate 20, height 5' 9" (1 753 m), weight (!) 170 kg (374 lb 1 9 oz), SpO2 95 %  ,       Body mass index is 55 25 kg/m²  ,     Systolic (69LKV), XYB:294 , Min:94 , JDD:050     Diastolic (07FJJ), JAMISON:93, Min:42, Max:63    Intake/Output Summary (Last 24 hours) at 3/8/2019 1044  Last data filed at 3/8/2019 0607  Gross per 24 hour   Intake 973 33 ml   Output 1810 ml   Net -836 67 ml     Weight (last 2 days)     Date/Time   Weight    03/08/19 0600   170 (374 12)  (Abnormal)     03/07/19 0600   164 (361 11)  (Abnormal)     03/06/19 0600   167 (368 17)  (Abnormal)             Telemetry Review: No significant arrhythmias seen on telemetry review  Atrial flutter, HR in 70s    Physical Exam   Constitutional: He is oriented to person, place, and time  No distress  Neck: Neck supple  No hepatojugular reflux present  Cardiovascular: Normal rate and intact distal pulses  Exam reveals no gallop and no friction rub  Murmur (systolic) heard  Pulmonary/Chest: Effort normal  No respiratory distress  He has no rales  Diminished throughout   Abdominal: Soft  Bowel sounds are normal  He exhibits no distension  There is no tenderness  obese   Musculoskeletal: He exhibits edema  3-4 + bilateral LE edema, 2-3+ LUE edema   Neurological: He is alert and oriented to person, place, and time  Forgetful   Skin: Skin is warm and dry  No erythema     Psychiatric:   Tearful     LABORATORY RESULTS  Results from last 7 days   Lab Units 03/05/19  0638 03/04/19  0449   CK TOTAL U/L 31* 29*     CBC with diff: Results from last 7 days   Lab Units 03/08/19 0922 03/08/19  0430 03/07/19 2138 03/07/19  0505 03/06/19 2129 03/06/19  1309 03/06/19  0553  03/05/19  1869  03/04/19 0449 03/03/19 0624 03/02/19  0617   WBC Thousand/uL  --  12 91*  --   --   --   --  12 11*  --  10 87*  --  13 09*  --  12 71*  --  12 68*   HEMOGLOBIN g/dL 9 6* 9 3* 9 1* 8 3* 8 6* 8 1* 8 6*   < > 9 1*   < > 7 2*   < > 7 4*   < > 7 5*   HEMATOCRIT % 31 1* 30 4* 29 6* 26 9* 28 3* 26 8* 27 0*   < > 29 5*   < > 23 9*   < > 24 1*   < > 25 1*   MCV fL  --  97  --   --   --   --  97  --  97  --  98  --  98  --  98   PLATELETS Thousands/uL  --  173  --   --   --   --  175  --  138*  --  157  --  141*  --  152   MCH pg  --  29 6  --   --   --   --  30 8  --  30 0  --  29 6  --  30 2  --  29 4   MCHC g/dL  --  30 6*  --   --   --   --  31 9  --  30 8*  --  30 1*  --  30 7*  --  29 9*   RDW %  --  15 9*  --   --   --   --  15 5*  --  15 9*  --  15 9*  --  15 9*  --  16 3*   MPV fL  --  11 6  --   --   --   --  11 7  --  12 1  --  11 4  --  11 2  --  11 1   NRBC AUTO /100 WBCs  --  0  --   --   --   --  0  --  0  --  0  --  0  --  0    < > = values in this interval not displayed       CMP:  Results from last 7 days   Lab Units 03/08/19  0430 03/07/19  0500 03/06/19  0552 03/05/19  6742 03/04/19 0449 03/03/19 0624 03/02/19  0617   POTASSIUM mmol/L 3 5 3 7 3 5 4 0 3 4* 3 2* 3 1*   CHLORIDE mmol/L 98* 101 101 101 101 102 104   CO2 mmol/L 31 26 28 24 27 29 27   BUN mg/dL 24 31* 23 35* 29* 21 22   CREATININE mg/dL 3 34* 3 91* 3 19* 4 39* 3 57* 2 87* 3 02*   CALCIUM mg/dL 7 1* 7 2* 7 0* 7 1* 6 9* 6 8* 7 0*   AST U/L  --   --   --   --  10  --   --    ALT U/L  --   --   --   --  <6*  --   --    ALK PHOS U/L  --   --   --   --  78  --   --    EGFR ml/min/1 73sq m 19 16 20 14 18 23 22     BMP:  Results from last 7 days   Lab Units 03/08/19  0430 03/07/19  0500 03/06/19  0552 03/05/19  6506 03/04/19  0449 03/03/19  0624 03/02/19  0617   POTASSIUM mmol/L 3 5 3 7 3 5 4 0 3 4* 3 2* 3 1* CHLORIDE mmol/L 98* 101 101 101 101 102 104   CO2 mmol/L 31 26 28 24 27 29 27   BUN mg/dL 24 31* 23 35* 29* 21 22   CREATININE mg/dL 3 34* 3 91* 3 19* 4 39* 3 57* 2 87* 3 02*   CALCIUM mg/dL 7 1* 7 2* 7 0* 7 1* 6 9* 6 8* 7 0*       Lab Results   Component Value Date    NTBNP 25,015 (H) 2019    NTBNP 25,548 (H) 2019      Results from last 7 days   Lab Units 19  0430 19  0500 19  0552 19  0638 19  0449 19  0624 19  0617   MAGNESIUM mg/dL 1 8 1 8 1 8 1 9 1 9 1 8 1 9       Results from last 7 days   Lab Units 19  1715   INR  1 28*     Lipid Profile:   Lab Results   Component Value Date    CHOL 146 2014    CHOL 145 2014     Lab Results   Component Value Date    HDL 44 2019    HDL 41 2018    HDL 52 2017     Lab Results   Component Value Date    LDLCALC 77 2019    LDLCALC 57 2018    LDLCALC 129 (H) 2017     Lab Results   Component Value Date    TRIG 129 2019    TRIG 102 2018    TRIG 71 2017     Cardiac testing:   Results for orders placed during the hospital encounter of 19   Echo complete with contrast if indicated    Narrative  [address] [id number] [phone]    Transthoracic Echocardiogram  2D, Doppler, and Color Doppler    Study date:  2019    Patient: Arcadio Villanueva  MR number: [id number]  Account number: [de-identified]  : 1960  Age: 60 years  Gender: Male  Status: Inpatient  Location: Bedside  Height: 72 in  Weight: 339 lb  BP: 89/ 54 mmHg    Indications: chest pain    Diagnoses: R07 9 - Chest pain, unspecified    Sonographer:  Natalee Dueñas RD, CCT  Primary Physician:  Erma Marrero DO  Referring Physician:  Maurisio Jimenez DO  Group:  [facility]  Interpreting Physician:  Cloyce Fail, DO    SUMMARY    PROCEDURE INFORMATION:  This was a technically difficult study despite the administration of echo contrast     LEFT VENTRICLE:  Systolic function was markedly reduced  Ejection fraction was estimated to be 30 %  There was severe diffuse hypokinesis with no obvious identifiable regional variations  Wall thickness was moderately increased  Concentric hypertrophy was present  VENTRICULAR SEPTUM:  There was dyssynergic motion  RIGHT VENTRICLE:  The ventricle was mildly dilated  Systolic function was moderately to markedly reduced  LEFT ATRIUM:  The atrium was mildly dilated  RIGHT ATRIUM:  The atrium was mildly dilated  AORTIC VALVE:  A bioprosthesis was present  It was not well visualized  Mean transvalvular gradient 13 mmHg  TRICUSPID VALVE:  There was mild regurgitation  PERICARDIUM:  A small, partially loculated pericardial effusion was identified along the left ventricular free wall  HISTORY: PRIOR HISTORY: Aortic valve prosthesis    PROCEDURE: The procedure was performed at the bedside  This was a routine study  The transthoracic approach was used  The study included complete 2D imaging, complete spectral Doppler, and color Doppler  The heart rate was 80 bpm, at the  start of the study  Intravenous contrast (Definity solution [1 3 ml Definity/8 7ml normal saline solution]) was administered  Intravenous contrast (Definity solution [1 3 ml Definity/8 7ml normal saline solution]) was administered to  opacify the left ventricle and enhance Doppler signals  Echocardiographic views were limited due to low windows and patient on mechanical ventilator  This was a technically difficult study despite the administration of echo contrast     LEFT VENTRICLE: Size was normal  Systolic function was markedly reduced  Ejection fraction was estimated to be 30 %  There was severe diffuse hypokinesis with no obvious identifiable regional variations  Wall thickness was moderately  increased  Concentric hypertrophy was present  DOPPLER: Normal sinus rhythm was absent   The study was not technically sufficient to allow evaluation of LV diastolic function  VENTRICULAR SEPTUM: There was dyssynergic motion  RIGHT VENTRICLE: The ventricle was mildly dilated  Systolic function was moderately to markedly reduced  LEFT ATRIUM: The atrium was mildly dilated  RIGHT ATRIUM: The atrium was mildly dilated  MITRAL VALVE: Not well visualized  DOPPLER: The transmitral velocity was within the normal range  There was no evidence for stenosis  There was no significant regurgitation  AORTIC VALVE: A bioprosthesis was present  It was not well visualized  Mean transvalvular gradient 13 mmHg  DOPPLER: There was no significant regurgitation  TRICUSPID VALVE: The valve structure was normal  There was normal leaflet separation  DOPPLER: The transtricuspid velocity was within the normal range  There was no evidence for stenosis  There was mild regurgitation  The tricuspid jet  envelope definition was inadequate for estimation of RV systolic pressure  PULMONIC VALVE: Leaflets exhibited normal thickness, no calcification, and normal cuspal separation  DOPPLER: The transpulmonic velocity was within the normal range  There was no significant regurgitation  PERICARDIUM: A small, partially loculated pericardial effusion was identified along the left ventricular free wall  The pericardium was normal in appearance  AORTA: The root exhibited normal size  SYSTEM MEASUREMENT TABLES    2D  %FS: 15 83 %  Ao Diam: 3 09 cm  EDV(Teich): 221 88 ml  EF(Teich): 32 54 %  ESV(Teich): 149 69 ml  IVSd: 1 59 cm  LA Diam: 5 18 cm  LVIDd: 6 58 cm  LVIDs: 5 54 cm  LVPWd: 1 43 cm  SV(Teich): 72 19 ml    CW  AV Env  Ti: 233 56 ms  AV VTI: 49 77 cm  AV Vmax: 2 64 m/s  AV Vmax: 2 67 m/s  AV Vmean: 2 13 m/s  AV maxP 95 mmHg  AV maxP 53 mmHg  AV meanP 73 mmHg    PW  LVOT Env  Ti: 215 22 ms  LVOT VTI: 11 41 cm  LVOT Vmax: 0 59 m/s  LVOT Vmax: 0 7 m/s  LVOT Vmean: 0 52 m/s  LVOT maxP 78 mmHg  LVOT maxP mmHg  LVOT meanP 24 mmHg  MV E Deep: 1 01 m/s    [facility]    Prepared and electronically signed by    Gorden Bumpers, DO  Signed 2019 10:14:55       Results for orders placed during the hospital encounter of 19   CHON    Narrative [address]  [address], [address] [facility]  [phone]    Transesophageal Echocardiogram  2D, Doppler, and Color Doppler    Study date:  2019    Patient: Anand Garrido  MR number: [id number]  Account number: [de-identified]  : 1960  Age: 60 years  Gender: Male  Status: Inpatient  Location: Bedside  Height: 69 in  Weight: 319 lb  BP: 101/ 54 mmHg    Indications: Bacteremia  Diagnoses: R78 81 - Bacteremia    Sonographer:  Tawny Spain RDCS  Interpreting Physician:  Damon Peoples DO  Primary Physician:  Jacques Fragoso DO  Referring Physician:  Migel Lee DO  Group:  [facility]  Cardiology Fellow:  Ginette Bright MD    IMPRESSIONS:  There was no echocardiographic evidence for valvular vegetation  SUMMARY    LEFT VENTRICLE:  Systolic function was moderately reduced  Ejection fraction was estimated to be 40 %  There was moderate diffuse hypokinesis  Wall thickness was mildly increased  There was mild concentric hypertrophy  RIGHT VENTRICLE:  The size was normal   Systolic function was mildly reduced  LEFT ATRIUM:  The atrium was mildly dilated  ATRIAL SEPTUM:  There was a small patent foramen ovale with left to right shunt identified by color Doppler  RIGHT ATRIUM:  The atrium was mildly dilated  MITRAL VALVE:  There was trace regurgitation  There was no echocardiographic evidence of vegetation  AORTIC VALVE:  A bioprosthesis was present  It exhibited normal function  There was no echocardiographic evidence of vegetation  TRICUSPID VALVE:  There was mild regurgitation    There was no echocardiographic evidence of vegetation  HISTORY: PRIOR HISTORY: Aortic valve replacement, NSTEMI, Cardiomyopathy, Acute kidney injury  PROCEDURE: The procedure was performed at the bedside  This was a routine study  The risks and alternatives of the procedure were explained to the patient's next of kin and informed consent was obtained  The transesophageal approach was  used  The study included complete 2D imaging, complete spectral Doppler, and color Doppler  The heart rate was 113 bpm, at the start of the study  An adult omniplane probe was inserted by the cardiology fellow under direct supervision of  the attending cardiologist  Intubated with ease  One intubation attempt(s)  There was no blood detected on the probe  Image quality was adequate  There were no complications during the procedure  MEDICATIONS: Sedation administered by  bedside nurse  LEFT VENTRICLE: Size was normal  Systolic function was moderately reduced  Ejection fraction was estimated to be 40 %  There was moderate diffuse hypokinesis  Wall thickness was mildly increased  There was mild concentric hypertrophy  DOPPLER: The study was not technically sufficient to allow evaluation of LV diastolic function  RIGHT VENTRICLE: The size was normal  Systolic function was mildly reduced  Wall thickness was normal     LEFT ATRIUM: The atrium was mildly dilated  No thrombus was identified  APPENDAGE: The appendage was small  No thrombus was identified  DOPPLER: The function was normal (normal emptying velocity)  ATRIAL SEPTUM: There was a small patent foramen ovale with left to right shunt identified by color Doppler  RIGHT ATRIUM: The atrium was mildly dilated  No thrombus was identified  MITRAL VALVE: Valve structure was normal  There was normal leaflet separation  There was no echocardiographic evidence of vegetation  DOPPLER: There was trace regurgitation  AORTIC VALVE: A bioprosthesis was present  It exhibited normal function   There was no echocardiographic evidence of vegetation  TRICUSPID VALVE: The valve structure was normal  There was normal leaflet separation  There was no echocardiographic evidence of vegetation  DOPPLER: There was mild regurgitation  PULMONIC VALVE: Leaflets exhibited normal thickness, no calcification, and normal cuspal separation  There was no echocardiographic evidence of vegetation  DOPPLER: There was no significant regurgitation  PERICARDIUM: There was no pericardial effusion seen in the images obtained  AORTA: The root exhibited normal size  There was no atheroma  There was no evidence for dissection  There was no evidence for aneurysm      [address] Accredited Echocardiography Laboratory    Prepared and electronically signed by    Denny Barakat DO  Signed 25-Jan-2019 14:01:14       Meds/Allergies   all current active meds have been reviewed and current meds:   Current Facility-Administered Medications   Medication Dose Route Frequency    acetaminophen (TYLENOL) tablet 650 mg  650 mg Oral Q6H PRN    atorvastatin (LIPITOR) tablet 40 mg  40 mg Oral Daily With Dinner    b complex-vitamin C-folic acid (NEPHROCAPS) capsule 1 capsule  1 capsule Oral Daily With Dinner    bisacodyl (DULCOLAX) rectal suppository 10 mg  10 mg Rectal Daily PRN    diltiazem (CARDIZEM CD) 24 hr capsule 120 mg  120 mg Oral Daily    heparin (porcine) 25,000 units in 250 mL infusion (premix)  3-30 Units/kg/hr (Order-Specific) Intravenous Titrated    heparin (porcine) injection 10,000 Units  10,000 Units Intravenous PRN    heparin (porcine) injection 5,000 Units  5,000 Units Intravenous PRN    hydrocortisone 1 % cream   Topical BID    melatonin tablet 6 mg  6 mg Oral HS    metoprolol (LOPRESSOR) injection 5 mg  5 mg Intravenous Q6H PRN    metoprolol tartrate (LOPRESSOR) tablet 50 mg  50 mg Oral TID    midodrine (PROAMATINE) tablet 10 mg  10 mg Oral Before Dialysis    nystatin (MYCOSTATIN) powder   Topical BID  omeprazole (PRILOSEC) suspension 2 mg/mL  20 mg Oral BID AC    oxyCODONE (ROXICODONE) IR tablet 5 mg  5 mg Oral Q4H PRN    oxyCODONE (ROXICODONE) IR tablet 7 5 mg  7 5 mg Oral Q4H PRN    polyvinyl alcohol (LIQUIFILM TEARS) 1 4 % ophthalmic solution 1 drop  1 drop Both Eyes Q3H PRN    vancomycin (VANCOCIN) IVPB (premix) 1,000 mg  10 mg/kg (Adjusted) Intravenous Once per day on Tue Thu Sat     Medications Prior to Admission   Medication    amLODIPine (NORVASC) 5 mg tablet    ascorbic acid (VITAMIN C) 500 MG tablet    aspirin (ECOTRIN) 325 mg EC tablet    fluticasone (FLONASE) 50 mcg/act nasal spray    loratadine (CLARITIN) 10 mg tablet    metoprolol tartrate (LOPRESSOR) 100 mg tablet    potassium chloride (K-DUR,KLOR-CON) 20 mEq tablet    pravastatin (PRAVACHOL) 40 mg tablet    torsemide (DEMADEX) 20 mg tablet       heparin (porcine) 3-30 Units/kg/hr (Order-Specific) Last Rate: 9 Units/kg/hr (03/08/19 0320)     Counseling / Coordination of Care  Total floor / unit time spent today 20 minutes  Greater than 50% of total time was spent with the patient and / or family counseling and / or coordination of care  ** Please Note: Dragon 360 Dictation voice to text software may have been used in the creation of this document   **

## 2019-03-08 NOTE — PLAN OF CARE
Problem: OCCUPATIONAL THERAPY ADULT  Goal: Performs self-care activities at highest level of function for planned discharge setting  See evaluation for individualized goals  Description  Treatment Interventions: ADL retraining, Functional transfer training, UE strengthening/ROM, Endurance training, Cognitive reorientation, Patient/family training, Equipment evaluation/education, Fine motor coordination activities, Compensatory technique education, Continued evaluation, Energy conservation, Activityengagement          See flowsheet documentation for full assessment, interventions and recommendations  Outcome: Progressing  Note:   Limitation: Decreased ADL status, Decreased Safe judgement during ADL, Decreased cognition, Decreased endurance, Decreased fine motor control, Decreased self-care trans, Decreased high-level ADLs, Decreased UE ROM, Decreased UE strength  Prognosis: Fair  Assessment: Patient participated in Skilled OT session this date with interventions consisting of ADL re training with the use of correct body mechnaics, deep breathing technique, safety awareness and fall prevention techniques,  functional cognition*,  therapeutic activities to: increase activity tolerance, increase postural control, increase trunk control and increase OOB/ sitting tolerance   Patient agreeable to OT treatment session, upon arrival patient was found supine in bed  In comparison to previous session, patient with improvements in seated EOB tolerance*   Patient requiring verbal cues for safety, verbal cues for correct technique, verbal cues for pacing thru activity steps, cognitive assistance to anticipate next step and one step directives  Patient continues to be functioning below baseline level, occupational performance remains limited secondary to factors listed above and increased risk for falls and injury  From OT standpoint, recommendation at time of d/c would be Short Term Rehab     Patient to benefit from continued Occupational Therapy treatment while in the hospital to address deficits as defined above and maximize level of functional independence with ADLs and functional mobility  OT Discharge Recommendation: Short Term Rehab  OT - OK to Discharge:  Yes

## 2019-03-08 NOTE — PLAN OF CARE
Problem: PHYSICAL THERAPY ADULT  Goal: Performs mobility at highest level of function for planned discharge setting  See evaluation for individualized goals  Description  Treatment/Interventions: LE strengthening/ROM, Therapeutic exercise, Endurance training, Patient/family training, Cognitive reorientation, Equipment eval/education, Bed mobility, Spoke to advanced practitioner, Spoke to case management, Spoke to nursing  Equipment Recommended:  (TBD- may benefit from [facility] bed)       See flowsheet documentation for full assessment, interventions and recommendations     Outcome: Progressing

## 2019-03-08 NOTE — PROGRESS NOTES
Progress Note - Infectious Disease   Wilberto Suggs 60 y o  male MRN: [id number]  Unit/Bed#: [facility] 834-01 Encounter: [id number]      Impression/Plan:  1  MSSA bacteremia:  Possibility of endocarditis considered in the setting of bioprosthetic AVR  Melanie Roach no evidence of valvular vegetations   Initial portal of entry may have been related to multiple upper back wounds   CT chest/abdomen/pelvis with no other evidence of acute infection   Possible from pneumonia as sputum culture was positive for MSSA   Bacteremia eventually cleared   Podiatry evaluated patient's feet and no acute issues   Neurology evaluation noted with prior changes in mental status, imaging abnormalities felt to be ischemic and similar compared to prior studies   No plan for MRI   Patient improved significantly after recent GI bleed       -antibiotics changed as below  -prolonged course of IV antibiotics of at least 6 weeks through 3/10 for this issue  -monitor temperature/WBC  -send blood cultures if patient spikes fever     2  Rash and peripheral eosinophilia:  Patient recently developed rash  It was noted to be flat and pruritic  Absolute eosinophil count elevated   Likely due to amiodarone as the patient tolerated rechallenge with Ancef and there was no difference in the rash when patient was switched to vancomycin empirically   I suspect that this will be a prolonged elevation given the half-life of amiodarone   Patient again noted with rash and itching today  Antibiotic therapy changed only to vancomycin and the patient's absolute eosinophilia is rising  Unfortunately with multiple drugs causing his elevation in eosinophilia will be difficult to determine the actual cause  At this point the patient does require antibiotics and so will attempt to continue as tolerated       -monitor WBC  -continue to trend fever curve/vitals  -change patient to ceftaroline and monitor response     3   Left forearm myositis/cellulitis:  Patient was noted to have large ecchymosis his left forearm after transition from the ICU   This was thought to be due to issues with obtaining access during his acute decline   The ecchymosis has largely resolved however the patient's arm remains swollen more so than his other extremities and painful   CT imaging of the arm was done and findings are notable for soft tissue edema and suggestion of myositis/cellulitis   No obvious collections seen though study limited without contrast   Vascular study with superficial thrombophlebitis in the arm   The patient fortunately remains hemodynamically stable with persistent leukocytosis   Repeat CT scan reviewed and surgical evaluation appreciated  Duplex study without acute change  The arm itself appears softer and less painful on exam      -will switch to ceftaroline given progressing rash and eosinophilia  -ongoing follow-up by surgery  -continue to monitor CBC  -continue to trend fever curve/vitals  -serial exams  -ongoing supportive care as per primary  -will likely treat for total of 14 days for possible myositis through 3/16     4  History of bioprosthetic AVR   Secondary to degenerative AS   Placed in July 2014  Mal Early no evidence of endocarditis   Ongoing follow-up by Cardiology      5  Acute kidney injury   Likely ischemic/septic ATN    Patient remains on hemodialysis   He is status post PermCath placement      -ongoing follow-up by Nephrology  -antibiotics re-dose for intermittent HD      6  Leukocytosis:  WBC  elevated  Josh Ledbetter continues to improve clinically   This may be due now to problem 3 and problem 2 may be contributing      -continue to monitor fever curve/vitals  -continue monitor CBC  -continue antibiotics as above     Above plan discussed in detail with patient      ID consult service will formally re-evaluate patient again over the weekend  Please contact ID attending on call if any additional questions or concerns      Antibiotics:  Vancomycin    24 hour events:  Patient had fall while working with physical therapy yesterday  Currently afebrile  White blood cell count 12 9  knee x-ray noted  Patient's other vitals are stable  Eosinophilia persists on labs    Subjective:  Patient seen at bedside this morning  He denies having any nausea, vomiting, chest pain or shortness of breath  He reports that his arm pain has been improved significantly  He continues to have itching all over his skin  Objective:  Vitals:  Temp:  [97 5 °F (36 4 °C)-98 5 °F (36 9 °C)] 97 9 °F (36 6 °C)  HR:  [] 72  Resp:  [18-20] 20  BP: ()/(42-81) 108/59  SpO2:  [94 %-98 %] 95 %  Temp (24hrs), Av °F (36 7 °C), Min:97 5 °F (36 4 °C), Max:98 5 °F (36 9 °C)  Current: Temperature: 97 9 °F (36 6 °C)    Physical Exam:   General Appearance:  Alert, interactive, nontoxic, no acute distress  Throat: Oropharynx moist without lesions  Lungs:   Clear to auscultation anteriorly bilaterally; no wheezes, rhonchi or rales; respirations unlabored   Heart:  RRR; no murmur, rub or gallop   Abdomen:   Soft, non-tender, non-distended, positive bowel sounds  Extremities: No clubbing, cyanosis; ongoing edema in all of his extremities  Skin: No new rashes or lesions  No new draining wounds noted  Patient's left forearm again is a lot softer to palpation and much less tender to palpation  He is noted to have erythema and patches of redness all over his abdomen, upper thighs and on his chest        Labs, Imaging, & Other studies:   All pertinent labs and imaging studies were personally reviewed  Results from last 7 days   Lab Units 19  0430 19  2138 19  0505  19  0553  19  0638   WBC Thousand/uL 12 91*  --   --   --  12 11*  --  10 87*   HEMOGLOBIN g/dL 9 3* 9 1* 8 3*   < > 8 6*   < > 9 1*   PLATELETS Thousands/uL 173  --   --   --  175  --  138*    < > = values in this interval not displayed       Results from last 7 days   Lab Units 19  8726 03/04/19  0449   POTASSIUM mmol/L 3 5   < > 3 4*   CHLORIDE mmol/L 98*   < > 101   CO2 mmol/L 31   < > 27   BUN mg/dL 24   < > 29*   CREATININE mg/dL 3 34*   < > 3 57*   EGFR ml/min/1 73sq m 19   < > 18   CALCIUM mg/dL 7 1*   < > 6 9*   AST U/L  --   --  10   ALT U/L  --   --  <6*   ALK PHOS U/L  --   --  78    < > = values in this interval not displayed  Results from last 7 days   Lab Units 03/01/19  1441   C DIFF TOXIN B  NEGATIVE for C difficle toxin by PCR

## 2019-03-08 NOTE — CONSULTS
The patient's Vancomycin therapy has been discontinued  Thank you for this consult; Pharmacy will sign-off now

## 2019-03-08 NOTE — OCCUPATIONAL THERAPY NOTE
[address] Progress Note     Patient Name: Kofi Heath  [id number] Date: 3/8/2019  Problem List  Patient Active Problem List   Diagnosis    Aortic stenosis, mild    Essential hypertension    Hyperlipidemia    Left bundle branch block    Murmur    Osteoarthritis of both knees    Pericardial disease    Sciatica    Age-related cataract of right eye    Venous insufficiency    Bilateral leg edema    Stress-induced cardiomyopathy    Acute respiratory failure with hypoxia ([facility] [address] )    History of aortic valve replacement with bioprosthetic valve    Persistent atrial fibrillation (HCC)    MSSA bacteremia - Resolved septic shock    Thrombocytopenia (HCC)    Acute blood loss anemia - Gastric ulcer    Leukocytosis    Cerebrovascular accident ([facility] [address] )    Acute metabolic encephalopathy    Skin rash    Acute renal failure    Hypovolemic shock (HCC)    GI bleed    Coagulopathy ([facility])    GI bleeding    Age-related nuclear cataract, bilateral    Open angle with borderline findings, low risk, bilateral    Presence of intraocular lens    Vitreous degeneration of both eyes    Left arm swelling    Deep tissue injury    Dysphagia    Cellulitis/myositis of left forearm    Left internal jugular vein thrombus    Atrial flutter/fibrillation - Stress-induced cardiomyopathy     Morbid obesity            03/08/19 1036   Restrictions/Precautions   Weight Bearing Precautions Per Order Yes   LUE Weight Bearing Per Order   (in sling)   Other Precautions Cognitive;Multiple lines; Fall Risk   General   Response to Previous Treatment Patient with no complaints from previous session   Lifestyle   Autonomy I ADLS, IADLS, transfers and functional mobility PTA   Reciprocal Relationships Pt lives w/ spouse and mother in law   Service to Others Pt works full time as a     Intrinsic Gratification pt likes trains   Pain Assessment   Pain Assessment FLACC   Pain Rating: FLACC (Rest) - Face 0   Pain Rating: FLACC (Rest) - Legs 0   Pain Rating: FLACC (Rest) - Activity 0   Pain Rating: FLACC (Rest) - Cry 0   Pain Rating: FLACC (Rest) - Consolability 0   Score: FLACC (Rest) 0   Pain Rating: FLACC (Activity) - Face 1   Pain Rating: FLACC (Activity) - Legs 0   Pain Rating: FLACC (Activity) - Activity 0   Pain Rating: FLACC (Activity) - Cry 1   Pain Rating: FLACC (Activity) - Consolability 0   Score: FLACC (Activity) 2   ADL   Where Assessed Edge of bed   Grooming Assistance 4  Minimal Assistance   Grooming Deficit Wash/dry hands; Wash/dry face; Increased time to complete   LB Dressing Assistance 1  Total Assistance   LB Dressing Deficit Don/doff R sock; Don/doff L sock   Bed Mobility   Rolling R 2  Maximal assistance   Additional items Assist x 2; Increased time required;LE management   Rolling L 2  Maximal assistance   Additional items Assist x 2; Increased time required;Verbal cues;LE management   Supine to Sit 2  Maximal assistance   Additional items Assist x 3; Increased time required;Verbal cues   Sit to Supine 2  Maximal assistance   Additional items Assist x 3; Increased time required;Verbal cues   Transfers   Additional Comments sat EOB 12 minuts w/ Min to Mod A x 1 w/ SBA of 2nd   ROM- Right Upper Extremities   R Shoulder AAROM   R Elbow AROM   R Wrist AROM   R Hand AROM   Cognition   Overall Cognitive Status Impaired   Arousal/Participation Responsive   Attention Attends with cues to redirect   Orientation Level Oriented to person;Oriented to place; Disoriented to time;Oriented to situation   Memory Decreased recall of precautions   Following Commands Follows one step commands with increased time or repetition   Comments Functional cog completed w/ G participation   able to correctly identify 4/5 ADL objects   Additional Activities   Additional Activities Comments Attempted word search, pt anxious able previous session and unable to focus; deep breathing completed   Activity Tolerance   Activity Tolerance Patient tolerated treatment well   Medical Staff Made Aware PT Carol and restorative Sophia present   Assessment   Assessment Patient participated in Skilled OT session this date with interventions consisting of ADL re training with the use of correct body mechnaics, deep breathing technique, safety awareness and fall prevention techniques,  functional cognition*,  therapeutic activities to: increase activity tolerance, increase postural control, increase trunk control and increase OOB/ sitting tolerance   Patient agreeable to OT treatment session, upon arrival patient was found supine in bed  In comparison to previous session, patient with improvements in seated EOB tolerance*   Patient requiring verbal cues for safety, verbal cues for correct technique, verbal cues for pacing thru activity steps, cognitive assistance to anticipate next step and one step directives  Patient continues to be functioning below baseline level, occupational performance remains limited secondary to factors listed above and increased risk for falls and injury  From OT standpoint, recommendation at time of d/c would be Short Term Rehab  Patient to benefit from continued Occupational Therapy treatment while in the hospital to address deficits as defined above and maximize level of functional independence with ADLs and functional mobility  Plan   Treatment Interventions ADL retraining;Functional transfer training;Cognitive reorientation; Endurance training;UE strengthening/ROM   Goal Expiration Date 03/13/19   Treatment Day 4   OT Frequency 3-5x/wk   Recommendation   OT Discharge Recommendation Short Term Rehab   OT - OK to Discharge Yes     Margaux Gautam MS, OTR/L

## 2019-03-08 NOTE — PHYSICAL THERAPY NOTE
PT TREATMENT       03/08/19 1037   Pain Assessment   Pain Assessment FLACC   Pain Rating: FLACC (Rest) - Face 0   Pain Rating: FLACC (Rest) - Legs 0   Pain Rating: FLACC (Rest) - Activity 0   Pain Rating: FLACC (Rest) - Cry 0   Pain Rating: FLACC (Rest) - Consolability 0   Score: FLACC (Rest) 0   Pain Rating: FLACC (Activity) - Face 1   Pain Rating: FLACC (Activity) - Legs 0   Pain Rating: FLACC (Activity) - Activity 0   Pain Rating: FLACC (Activity) - Cry 1   Pain Rating: FLACC (Activity) - Consolability 0   Score: FLACC (Activity) 2   Restrictions/Precautions   Weight Bearing Precautions Per Order Yes   LUE Weight Bearing Per Order   (IN SLING )   Braces or Orthoses   (FOAM SLING L UE )   Other Precautions Cognitive;Multiple lines;Pain; Fall Risk;O2   General   Chart Reviewed Yes   Additional Pertinent History PATIENT S/P "SLIDE" FROM EOB POSITION ON 3/7/19  XRAY L KNEE (-) FOR ACUTE ABNORMALITY  Response to Previous Treatment Patient with no complaints from previous session  Family/Caregiver Present No   Cognition   Overall Cognitive Status Impaired   Arousal/Participation Alert   Attention Attends with cues to redirect   Subjective   Subjective "MY KNEES HURT"   Bed Mobility   Rolling R 2  Maximal assistance   Additional items Assist x 2; Increased time required;LE management   Rolling L 2  Maximal assistance   Additional items Assist x 2; Increased time required;LE management   Supine to Sit 2  Maximal assistance   Additional items Assist x 3   Sit to Supine 2  Maximal assistance   Additional items Assist x 3   Transfers   Sit to Stand Unable to assess   Stand to Sit Unable to assess   Balance   Static Sitting Poor   Endurance Deficit   Endurance Deficit Yes   Endurance Deficit Description FATIGUE; GROSS DECONDITIONING    Activity Tolerance   Activity Tolerance Patient tolerated treatment well   Nurse Made Aware  [address] Staff Made Aware [employer] OT RESTORATIVE THERAPIST; Titi Ramos (PT)- TO REQUEST ORDER OF KREG EZWIDER BED    Exercises   Hip Abduction Supine;5 reps;AAROM; Bilateral   Knee AROM Long Arc Quad Sitting;AAROM;10 reps;Right   Ankle Pumps Supine;10 reps;Bilateral;AAROM   Assessment   Prognosis Fair   Problem List Decreased strength;Decreased endurance; Impaired balance;Decreased mobility; Decreased cognition; Impaired judgement;Decreased safety awareness; Obesity; Decreased skin integrity   Assessment PT INITIATED TREATMENT SESSION IN ORDER TO ASSIST PATIENT IN ACHIEVING GOALS TO IMPROVE BED MOBILITY, LE STRENGTH AND ROM, SITTING BALANCE AND TOLERANCE, AND OVERALL ACTIVITY TOLERANCE  PATIENT PRESENTING WITHOUT SELF REPORTED GOALS (COGNITIVE DEFICITS)  FUNCTIONAL MOBILITY REMAINS LIMITED BY COGNITIVE DEFICITS, PHYSICAL DEFICITS (OBESITY, GROSS WEAKNESS), MEDICAL STATUS (FLUID OVERLOAD)  PATIENT REQUIRED MAX-AX3 FOR PERFORMANCE OF SUPINE<-->SIT TRANSFER WITH 2 PERSONS IN FRONT AND 1 BEHIND  HE MAINTAINED STATIC SITTING BALANCE EOB X 12 MINUTES MIN-MODAX1 (FLUCTUATING WITH LEVEL OF FATIGUE) AND SBA OF 2ND PERSON  PROGRESSED SITTING BALANCE BY HAVING THERAPIST IN FRONT HAVING PATIENT PULL TRUNK FORWARD TO ACTIVE CARE AND IDENTIFY COM (3X X 30 SECONDS)  PATIENT INCONTINENT OF BOWEL REQUIRING THE PERFORMANCE OF ROLLING ON 4 OCCASIONS, [address]  PT ORDERED KREG EZ WIDER BED SO THAT PATIENT MAY IMPROVE BED MOBILITY (ESPECIALLY TO UTILIZE CHAIR POSITION OF BED SINCE THIS PATIENT IS UNABLE TO TRANSFER OOB)  RECOMMENDATION FOR STR REMAINS APPROPRIATE  PATIENT WILL BENEFIT FROM CONTINUED SKILLED PT THIS ADMISSION TO ACHIEVE MAXIMAL FUNCTION AND SAFETY      Goals   Patient Goals TO REST    LTG Expiration Date 03/22/19   Long Term Goal #1 1) PERFORM BED MOBILITY MOD-AX2 TO PARTICIPATE IN FREQUENT REPOSITIONING AND IMPROVE SKIN INTEGRITY; 2) MAINTAIN STATIC/DYNAMIC SITTING EOB X 30 MINUTES S LEVEL IN ORDER TO IMPROVE OVERALL ACTIVITY TOLERANCE AND CORE STRENGTH/STABILITY; 3) IMPROVE B/L LE AND UE STRENGTH BY 1/2 GRADE IN ORDER TO IMPROVE PARTICIPATION IN BED MOBILITY; 4) PERFORM SIT<-->STAND TRANSFER MOD-AX2 IN ORDER TO PREPARE FOR FUTURE OOB TRANSFERS (PT TO SEE WHEN PATIENT ABLE TO MAINTAIN STATIC STANDING MOD-AX2 X 30 SECONDS TO ASSESS STANDING OOB TRANSFERS)    Treatment Day 3   Plan   Treatment/Interventions LE strengthening/ROM; Therapeutic exercise; Endurance training;Equipment eval/education; Bed mobility;OT;Spoke to nursing   Progress Slow progress, decreased activity tolerance   PT Frequency Other (Comment)  (3-5X/WK)   Recommendation   Recommendation Short-term skilled PT   Equipment Recommended   (KREG EZ WIDER BED )   PT - OK to Discharge Yes  (TO STR WHEN MED UMESH )   Virginie Graham, PT

## 2019-03-09 ENCOUNTER — APPOINTMENT (INPATIENT)
Dept: DIALYSIS | Facility: HOSPITAL | Age: 59
DRG: 871 | End: 2019-03-09
Attending: INTERNAL MEDICINE
Payer: COMMERCIAL

## 2019-03-09 LAB
ANION GAP SERPL CALCULATED.3IONS-SCNC: 8 MMOL/L (ref 4–13)
APTT PPP: 73 SECONDS (ref 26–38)
APTT PPP: 76 SECONDS (ref 26–38)
BASOPHILS # BLD AUTO: 0.05 THOUSANDS/ΜL (ref 0–0.1)
BASOPHILS NFR BLD AUTO: 1 % (ref 0–1)
BUN SERPL-MCNC: 35 MG/DL (ref 5–25)
CALCIUM SERPL-MCNC: 7.4 MG/DL (ref 8.3–10.1)
CHLORIDE SERPL-SCNC: 98 MMOL/L (ref 100–108)
CO2 SERPL-SCNC: 28 MMOL/L (ref 21–32)
CREAT SERPL-MCNC: 4.4 MG/DL (ref 0.6–1.3)
EOSINOPHIL # BLD AUTO: 1.62 THOUSAND/ΜL (ref 0–0.61)
EOSINOPHIL NFR BLD AUTO: 16 % (ref 0–6)
ERYTHROCYTE [DISTWIDTH] IN BLOOD BY AUTOMATED COUNT: 15.8 % (ref 11.6–15.1)
GFR SERPL CREATININE-BSD FRML MDRD: 14 ML/MIN/1.73SQ M
GLUCOSE SERPL-MCNC: 86 MG/DL (ref 65–140)
HCT VFR BLD AUTO: 26.4 % (ref 36.5–49.3)
HGB BLD-MCNC: 8 G/DL (ref 12–17)
IMM GRANULOCYTES # BLD AUTO: 0.19 THOUSAND/UL (ref 0–0.2)
IMM GRANULOCYTES NFR BLD AUTO: 2 % (ref 0–2)
LYMPHOCYTES # BLD AUTO: 0.71 THOUSANDS/ΜL (ref 0.6–4.47)
LYMPHOCYTES NFR BLD AUTO: 7 % (ref 14–44)
MAGNESIUM SERPL-MCNC: 1.8 MG/DL (ref 1.6–2.6)
MCH RBC QN AUTO: 29.5 PG (ref 26.8–34.3)
MCHC RBC AUTO-ENTMCNC: 30.3 G/DL (ref 31.4–37.4)
MCV RBC AUTO: 97 FL (ref 82–98)
MONOCYTES # BLD AUTO: 0.84 THOUSAND/ΜL (ref 0.17–1.22)
MONOCYTES NFR BLD AUTO: 8 % (ref 4–12)
NEUTROPHILS # BLD AUTO: 6.92 THOUSANDS/ΜL (ref 1.85–7.62)
NEUTS SEG NFR BLD AUTO: 66 % (ref 43–75)
NRBC BLD AUTO-RTO: 0 /100 WBCS
PLATELET # BLD AUTO: 135 THOUSANDS/UL (ref 149–390)
PMV BLD AUTO: 11.6 FL (ref 8.9–12.7)
POTASSIUM SERPL-SCNC: 3.9 MMOL/L (ref 3.5–5.3)
RBC # BLD AUTO: 2.71 MILLION/UL (ref 3.88–5.62)
SODIUM SERPL-SCNC: 134 MMOL/L (ref 136–145)
WBC # BLD AUTO: 10.33 THOUSAND/UL (ref 4.31–10.16)

## 2019-03-09 PROCEDURE — 80048 BASIC METABOLIC PNL TOTAL CA: CPT | Performed by: INTERNAL MEDICINE

## 2019-03-09 PROCEDURE — 85025 COMPLETE CBC W/AUTO DIFF WBC: CPT | Performed by: INTERNAL MEDICINE

## 2019-03-09 PROCEDURE — 86803 HEPATITIS C AB TEST: CPT | Performed by: INTERNAL MEDICINE

## 2019-03-09 PROCEDURE — 99232 SBSQ HOSP IP/OBS MODERATE 35: CPT | Performed by: INTERNAL MEDICINE

## 2019-03-09 PROCEDURE — 86704 HEP B CORE ANTIBODY TOTAL: CPT | Performed by: INTERNAL MEDICINE

## 2019-03-09 PROCEDURE — 83735 ASSAY OF MAGNESIUM: CPT | Performed by: INTERNAL MEDICINE

## 2019-03-09 PROCEDURE — 90935 HEMODIALYSIS ONE EVALUATION: CPT | Performed by: INTERNAL MEDICINE

## 2019-03-09 PROCEDURE — 86706 HEP B SURFACE ANTIBODY: CPT | Performed by: INTERNAL MEDICINE

## 2019-03-09 PROCEDURE — 87340 HEPATITIS B SURFACE AG IA: CPT | Performed by: INTERNAL MEDICINE

## 2019-03-09 PROCEDURE — 85730 THROMBOPLASTIN TIME PARTIAL: CPT | Performed by: INTERNAL MEDICINE

## 2019-03-09 PROCEDURE — 86705 HEP B CORE ANTIBODY IGM: CPT | Performed by: INTERNAL MEDICINE

## 2019-03-09 RX ORDER — PANTOPRAZOLE SODIUM 40 MG/1
40 TABLET, DELAYED RELEASE ORAL
Status: DISCONTINUED | OUTPATIENT
Start: 2019-03-10 | End: 2019-03-16 | Stop reason: HOSPADM

## 2019-03-09 RX ADMIN — Medication 20 MG: at 17:42

## 2019-03-09 RX ADMIN — HEPARIN SODIUM 8 UNITS/KG/HR: 10000 INJECTION, SOLUTION INTRAVENOUS at 14:29

## 2019-03-09 RX ADMIN — OXYCODONE HYDROCHLORIDE 7.5 MG: 5 TABLET ORAL at 14:29

## 2019-03-09 RX ADMIN — Medication 1 CAPSULE: at 17:42

## 2019-03-09 RX ADMIN — HYDROCORTISONE: 1 CREAM TOPICAL at 08:44

## 2019-03-09 RX ADMIN — MELATONIN 6 MG: at 22:25

## 2019-03-09 RX ADMIN — ATORVASTATIN CALCIUM 40 MG: 40 TABLET, FILM COATED ORAL at 17:42

## 2019-03-09 RX ADMIN — CEFTAROLINE FOSAMIL 400 MG: 400 POWDER, FOR SOLUTION INTRAVENOUS at 12:43

## 2019-03-09 RX ADMIN — Medication 20 MG: at 06:15

## 2019-03-09 RX ADMIN — METOPROLOL TARTRATE 50 MG: 50 TABLET, FILM COATED ORAL at 17:42

## 2019-03-09 RX ADMIN — CEFTAROLINE FOSAMIL 400 MG: 400 POWDER, FOR SOLUTION INTRAVENOUS at 21:59

## 2019-03-09 RX ADMIN — MIDODRINE HYDROCHLORIDE 10 MG: 5 TABLET ORAL at 10:23

## 2019-03-09 RX ADMIN — OXYCODONE HYDROCHLORIDE 5 MG: 5 TABLET ORAL at 10:26

## 2019-03-09 RX ADMIN — OXYCODONE HYDROCHLORIDE 7.5 MG: 5 TABLET ORAL at 19:50

## 2019-03-09 RX ADMIN — METOPROLOL TARTRATE 50 MG: 50 TABLET, FILM COATED ORAL at 21:59

## 2019-03-09 RX ADMIN — NYSTATIN: 100000 POWDER TOPICAL at 08:44

## 2019-03-09 RX ADMIN — DILTIAZEM HYDROCHLORIDE 120 MG: 120 CAPSULE, COATED, EXTENDED RELEASE ORAL at 17:42

## 2019-03-09 RX ADMIN — NYSTATIN: 100000 POWDER TOPICAL at 17:43

## 2019-03-09 RX ADMIN — ACETAMINOPHEN 650 MG: 325 TABLET, FILM COATED ORAL at 17:43

## 2019-03-09 RX ADMIN — HYDROCORTISONE: 1 CREAM TOPICAL at 17:43

## 2019-03-09 RX ADMIN — Medication 400 MG: at 14:29

## 2019-03-09 NOTE — PROGRESS NOTES
NEPHROLOGY PROGRESS NOTE   Riley Espinoza 60 y o  male MRN: [id number]  Unit/Bed#: [facility] 834-01 Encounter: [id number]  Reason for Consult: PATRICK    ASSESSMENT AND PLAN:  Patient is a 58-year-old male with a history of hypertension/morbid obesity/bioprosthetic aortic valve replacement who presented with shock and AK I from [address]:  His course was complicated by MSSA bacteremia/AK I/GI bleeding/atrial fibrillation:  We are asked to follow for PATRICK on CKD now hemodialysis dependence after initial CRRT:     PATRICK, baseline serum creatinine 0 7 to 0 8  -PATRICK most likely secondary to ischemic/septic ATN in the setting of septic shock, another concern for AIN given peripheral eosinophilia/rash  No urine sample available for urine eosinophils  -now remains dialysis dependent since 2/1/19 on IHD  HD today  Will keep patient on TTS schedule  Continue to monitor for renal recovery  Creatinine worsening off dialysis  -urine output remains minimal  -currently has PermCath  -recent urinalysis in January 2019 showed concentrated sample, greater than 3+ proteinuria, innumerable RBCs, positive nitrate  -CT scan last month showed no hydronephrosis      I saw and examined patient during hemodialysis treatment at 11:51 AM on 3/9/2019  The patient was receiving hemodialysis for treatment of PATRICK  I also reviewed vital signs, intake and output, lab results and recent events, and agree with dialysis order  Tolerating HD  BP acceptable  Time: 4 hours  Qb: 400  Sodium: 138  Dialyzer: F160  Qd: 1 5 times Qb  Potassium: as per protocol  Access: Perm cath  UF goal: 3-3 5L  Bicarbonate: 35 Calcium 2 5    Volume overload/systolic CHF  -repeat echo showed EF 55%  -still seems volume overloaded but improving, UF removal as mentioned above   -post HD weight slowly reducing with last post HD weight 153 kg  Overall weight significantly fluctuating,?   Accurate      MSSA bacteremia, CHON unremarkable  -currently remains on IV antibiotic as per ID     Anemia, continue to closely monitor   Transfuse p r n  For hemoglobin less than seven   -if blood transfusion needed, would recommend all transplant precautions with leuko reduced, CMV-negative, irradiated etc   -check iron studies once infection issues are resolved      Borderline hyponatremia, Continue to monitor with dialysis      Peripheral eosinophilia/rash  -still continued to have peripheral eosinophilia   Concern for Amiodarone related versus antibiotic versus AIN   Currently remains on antibiotic which was changed by ID for MSSA bacteremia   -continue to closely monitor   -need to continue PPI due to recent GI bleed issues      ?  Myositis left upper extremity   Status post CT with IV contrast (on 3/5/19):?  Necrotizing fasciitis with nonspecific cellulitis and myositis  surgery follow-up, no intervention planned  -GI bleeding:  Secondary to pyloric junction ulcer status post EGD x2; status post injection and clipping   Currently off Carafate   -acute hypoxic respiratory failure improved   Challenge estimated dry weight   -stress cardiomyopathy in the setting of bacteremia   Resolved based on last echo  -Atrial flutter:  Per primary service/Cardiology  Left IJ thrombus, on heparin drip    SUBJECTIVE:  Patient seen and examined at bedside  Patient denies any chest pain or shortness of breath  Denies any nausea, vomiting      OBJECTIVE:  Current Weight: Weight - Scale: (!) 161 kg (354 lb 8 oz)  Vitals:    03/09/19 1130   BP: 110/58   Pulse: 71   Resp:    Temp:    SpO2:        Intake/Output Summary (Last 24 hours) at 3/9/2019 1149  Last data filed at 3/9/2019 0930  Gross per 24 hour   Intake 830 ml   Output    Net 830 ml       Physical Examination:  General:  Lying in bed, no acute distress   Eyes:  Mild conjunctival pallor present  ENT:  External examination of ears and nose unremarkable  Neck:  Supple  Respiratory:  Bilateral decreased breath sound at bases  CVS:  S1, S2 present  GI:  Soft, nontender, nondistended  CNS:  Active alert oriented  Extremities:  One to 2+ edema in legs  Skin:  Peripheral rash, slowly improving    Medications:    Current Facility-Administered Medications:     acetaminophen (TYLENOL) tablet 650 mg, 650 mg, Oral, Q6H PRN, KATHY Lugo, 650 mg at 03/08/19 1044    atorvastatin (LIPITOR) tablet 40 mg, 40 mg, Oral, Daily With Dinner, KATHY Ayoub, 40 mg at 03/08/19 1558    b complex-vitamin C-folic acid (NEPHROCAPS) capsule 1 capsule, 1 capsule, Oral, Daily With Dinner, Tara Mays MD, 1 capsule at 03/08/19 1558    bisacodyl (DULCOLAX) rectal suppository 10 mg, 10 mg, Rectal, Daily PRN, KATHY Ayoub    ceftaroline (TEFLARO) 400 mg in sodium chloride 0 9 % 50 mL IVPB, 400 mg, Intravenous, Q12H, Troy Pérez MD    diltiazem (CARDIZEM CD) 24 hr capsule 120 mg, 120 mg, Oral, Daily, KATHY Luque, 120 mg at 03/08/19 1810    heparin (porcine) 25,000 units in 250 mL infusion (premix), 3-30 Units/kg/hr (Order-Specific), Intravenous, Titrated, Vandana Walsh PA-C, Last Rate: 12 9 mL/hr at 03/08/19 2015, 8 Units/kg/hr at 03/08/19 2015    heparin (porcine) injection 10,000 Units, 10,000 Units, Intravenous, PRN, Vandana Walsh PA-C, 10,000 Units at 03/07/19 1544    heparin (porcine) injection 5,000 Units, 5,000 Units, Intravenous, PRN, Vandana Walsh PA-C, 5,000 Units at 03/08/19 2011    hydrocortisone 1 % cream, , Topical, BID, KATHY Lugo    magnesium oxide (MAG-OX) tablet 400 mg, 400 mg, Oral, Daily, Tara Mays MD, 400 mg at 03/08/19 1557    melatonin tablet 6 mg, 6 mg, Oral, HS, Leidy Martinez PA-C, 6 mg at 03/08/19 2206    metoprolol (LOPRESSOR) injection 5 mg, 5 mg, Intravenous, Q6H PRN, Sita Chavez MD, 5 mg at 03/02/19 1150    metoprolol tartrate (LOPRESSOR) tablet 50 mg, 50 mg, Oral, TID, Tara Mays MD, 50 mg at 03/08/19 2206    midodrine (PROAMATINE) tablet 10 mg, 10 mg, Oral, Before Dialysis, Tara Mays, MD, 10 mg at 03/09/19 1023    nystatin (MYCOSTATIN) powder, , Topical, BID, Kristal Bond, CRNP    omeprazole (PRILOSEC) suspension 2 mg/mL, 20 mg, Oral, BID AC, Kristal Bond, CRNP, 20 mg at 03/09/19 0615    oxyCODONE (ROXICODONE) IR tablet 5 mg, 5 mg, Oral, Q4H PRN, Alisa Patel MD, 5 mg at 03/09/19 1026    oxyCODONE (ROXICODONE) IR tablet 7 5 mg, 7 5 mg, Oral, Q4H PRN, Alisa Patel MD, 7 5 mg at 03/08/19 0246    polyvinyl alcohol (LIQUIFILM TEARS) 1 4 % ophthalmic solution 1 drop, 1 drop, Both Eyes, Q3H PRN, KATHY Elaine, 1 drop at 03/05/19 0603    Laboratory Results:  Results from last 7 days   Lab Units 03/09/19  0937 03/09/19  0935 03/08/19  0922 03/08/19  0430 03/07/19  2138 03/07/19  0505 03/07/19  0500 03/06/19  2129 03/06/19  1309 03/06/19  0553 03/06/19  0552  03/05/19  1488  03/04/19  0449  03/03/19  0624   WBC Thousand/uL 10 33*  --   --  12 91*  --   --   --   --   --  12 11*  --   --  10 87*  --  13 09*  --  12 71*   HEMOGLOBIN g/dL 8 0*  --  9 6* 9 3* 9 1* 8 3*  --  8 6* 8 1* 8 6*  --    < > 9 1*   < > 7 2*   < > 7 4*   HEMATOCRIT % 26 4*  --  31 1* 30 4* 29 6* 26 9*  --  28 3* 26 8* 27 0*  --    < > 29 5*   < > 23 9*   < > 24 1*   PLATELETS Thousands/uL 135*  --   --  173  --   --   --   --   --  175  --   --  138*  --  157  --  141*   POTASSIUM mmol/L  --  3 9  --  3 5  --   --  3 7  --   --   --  3 5  --  4 0  --  3 4*  --  3 2*   CHLORIDE mmol/L  --  98*  --  98*  --   --  101  --   --   --  101  --  101  --  101  --  102   CO2 mmol/L  --  28  --  31  --   --  26  --   --   --  28  --  24  --  27  --  29   BUN mg/dL  --  35*  --  24  --   --  31*  --   --   --  23  --  35*  --  29*  --  21   CREATININE mg/dL  --  4 40*  --  3 34*  --   --  3 91*  --   --   --  3 19*  --  4 39*  --  3 57*  --  2 87*   CALCIUM mg/dL  --  7 4*  --  7 1*  --   --  7 2*  --   --   --  7 0*  --  7 1*  --  6 9*  --  6 8*   MAGNESIUM mg/dL  --  1 8  --  1 8  --   --  1 8  --   --   --  1 8  --  1 9  -- 1 9  --  1 8   PHOSPHORUS mg/dL  --   --   --   --   --   --   --   --   --   --   --   --  4 3  --  3 5  --  3 0    < > = values in this interval not displayed  Results for orders placed during the hospital encounter of 01/24/19   XR chest portable    Narrative CHEST     INDICATION:   ETT placement  COMPARISON:  2/21/2019    EXAM PERFORMED/VIEWS:  XR CHEST PORTABLE      FINDINGS:      There has been interval placement of an endotracheal tube which projects approximately 3 cm above the raad  Lines and tubes are otherwise unchanged from prior exam     Heart shadow is enlarged but unchanged from prior exam     There is pulmonary vascular congestion which appears unchanged from the prior study  Osseous structures appear within normal limits for patient age  Impression Endotracheal tube in appropriate position  Cardiomegaly with stable mild pulmonary vascular congestion  Workstation performed: [id number]       No results found for this or any previous visit  No results found for this or any previous visit  No results found for this or any previous visit  No results found for this or any previous visit  No results found for this or any previous visit  Portions of the record may have been created with voice recognition software  Occasional wrong word or "sound a like" substitutions may have occurred due to the inherent limitations of voice recognition software  Read the chart carefully and recognize, using context, where substitutions have occurred

## 2019-03-09 NOTE — PROGRESS NOTES
General Cardiology Progress Note   Rell Moran 60 y o  male MRN: [id number]  Unit/Bed#: [facility] 834-01 Encounter: [id number]      Assessment:  Principal Problem:    MSSA bacteremia - Resolved septic shock  Active Problems:    Essential hypertension    Stress-induced cardiomyopathy    Acute respiratory failure with hypoxia (HCC)    History of aortic valve replacement with bioprosthetic valve    Persistent atrial fibrillation (HCC)    Thrombocytopenia (HCC)    Acute blood loss anemia - Gastric ulcer    Cerebrovascular accident ([address] )    Acute metabolic encephalopathy    Skin rash    Acute renal failure    Hypovolemic shock (HCC)    GI bleed    Left arm swelling    Deep tissue injury    Dysphagia    Cellulitis/myositis of left forearm    Left internal jugular vein thrombus    Atrial flutter/fibrillation - Stress-induced cardiomyopathy     Morbid obesity       Impression:     AFib/flutter  o Heart rates very well controlled in the 70s, currently flutter with 4-1 conduction  o Would not conduct own ongoing anticoagulation long term for atrial fib/flutter due to bleeding risk   Hypotension  o No evidence in the last 48 hours after switching to long-acting diltiazem   Acute kidney injury  o Dialysis dependent   Anasarca  o Volume management by dialysis   Recent GI bleed   History of AVR   Anemia   Left IJ thrombus  o On heparin drip per the primary team   Recent stress myopathy in the setting of sepsis and bacteremia, recovered   Left bundle branch block   MSSA bacteremia/left upper extremity cellulitis-antibiotics completed today    Plan:     Continue current rate control for atrial flutter   Use midodrine if necessary predialysis, although blood pressures have been much more stable in the last 48 hours   No anticoagulation from an AFib/flutter standpoint, but being treated for his left IJ thrombus rate 18    Subjective:   Patient seen and examined        AFib/a flutter rates continue to be very well controlled  Going for dialysis this morning  Blood pressure is a little more stable, have ranged 100/63 to 128/57 the last 24 hours    ROS:     Still complaining of left upper extremity discomfort although slightly improve   denies palpitations, shortness of breath, orthopnea, chest pain   Denies any lightheadedness    Objective   Vitals: Blood pressure 106/60, pulse 71, temperature 98 5 °F (36 9 °C), temperature source Oral, resp  rate 18, height 5' 9" (1 753 m), weight (!) 161 kg (354 lb 8 oz), SpO2 96 %  , Body mass index is 52 35 kg/m² , I/O last 3 completed shifts: In: 795 3 [P O :430; I V :365 3]  Out: -   No intake/output data recorded  Wt Readings from Last 3 Encounters:   03/09/19 (!) 161 kg (354 lb 8 oz)   01/24/19 (!) 154 kg (339 lb 8 1 oz)   06/11/18 (!) 155 kg (341 lb)       Intake/Output Summary (Last 24 hours) at 3/9/2019 0834  Last data filed at 3/8/2019 2315  Gross per 24 hour   Intake 430 ml   Output    Net 430 ml     I/O last 3 completed shifts:   In: 795 3 [P O :430; I V :365 3]  Out: -     Telemetry shows atrial flutter with 4-1 conduction, heart rates 70s    Physical Exam:    GEN: Wes Stallings appears well, alert and oriented x 3, pleasant and cooperative   NECK:  Supple, positive JVD  HEART:  Regular rate and rhythm, no murmurs, normal heart sounds  LUNGS: clear to auscultation bilaterally; no wheezes, rales, or rhonchi   ABDOMEN: normal bowel sounds, soft, no tenderness, no distention  EXTREMITIES:  Pulses difficult to assess due to edema; no clubbing, cyanosis, 3+ lower extremity edema, 2+ left upper extremity edema    Meds/Allergies   Allergies   Allergen Reactions    Amiodarone      Developed Rash    Penicillins Rash     However, has subsequently tolerated Cefazolin and Cefepime       Current Facility-Administered Medications:     acetaminophen (TYLENOL) tablet 650 mg, 650 mg, Oral, Q6H PRN, Kristal Bond, CRNP, 650 mg at 03/08/19 1044    atorvastatin (LIPITOR) tablet 40 mg, 40 mg, Oral, Daily With KATHY Hightower, 40 mg at 03/08/19 1558    b complex-vitamin C-folic acid (NEPHROCAPS) capsule 1 capsule, 1 capsule, Oral, Daily With Sumit Zavala MD, 1 capsule at 03/08/19 1558    bisacodyl (DULCOLAX) rectal suppository 10 mg, 10 mg, Rectal, Daily PRN, Exxon Mobil CorporationKATHY    ceftaroline (TEFLARO) 400 mg in sodium chloride 0 9 % 50 mL IVPB, 400 mg, Intravenous, Q12H, Froylan Childs MD    diltiazem (CARDIZEM CD) 24 hr capsule 120 mg, 120 mg, Oral, Daily, KATHY Luque, 120 mg at 03/08/19 1810    heparin (porcine) 25,000 units in 250 mL infusion (premix), 3-30 Units/kg/hr (Order-Specific), Intravenous, Titrated, CECILE Pedroza-C, Last Rate: 12 9 mL/hr at 03/08/19 2015, 8 Units/kg/hr at 03/08/19 2015    heparin (porcine) injection 10,000 Units, 10,000 Units, Intravenous, PRN, Ab Blanco PA-C, 10,000 Units at 03/07/19 1544    heparin (porcine) injection 5,000 Units, 5,000 Units, Intravenous, PRN, Ab Blanco PA-C, 5,000 Units at 03/08/19 2011    hydrocortisone 1 % cream, , Topical, BID, KATHY Lugo    magnesium oxide (MAG-OX) tablet 400 mg, 400 mg, Oral, Daily, Sammy Blandon MD, 400 mg at 03/08/19 1557    melatonin tablet 6 mg, 6 mg, Oral, HS, Leidy Martinez, PA-C, 6 mg at 03/08/19 2206    metoprolol (LOPRESSOR) injection 5 mg, 5 mg, Intravenous, Q6H PRN, Lacy Vicente MD, 5 mg at 03/02/19 1150    metoprolol tartrate (LOPRESSOR) tablet 50 mg, 50 mg, Oral, TID, Sammy Blandon MD, 50 mg at 03/08/19 2206    midodrine (PROAMATINE) tablet 10 mg, 10 mg, Oral, Before Dialysis, Sammy Blandon MD, 10 mg at 03/07/19 0904    nystatin (MYCOSTATIN) powder, , Topical, BID, KristalKATHY Tadeo    omeprazole (PRILOSEC) suspension 2 mg/mL, 20 mg, Oral, BID AC, Kristal KATHY Bond, 20 mg at 03/09/19 0615    oxyCODONE (ROXICODONE) IR tablet 5 mg, 5 mg, Oral, Q4H PRN, Lacy Vicente MD, 5 mg at 03/08/19 2008    oxyCODONE (ROXICODONE) IR tablet 7 5 mg, 7 5 mg, Oral, Q4H PRN, Yasmin Silverman MD, 7 5 mg at 03/08/19 0246    polyvinyl alcohol (LIQUIFILM TEARS) 1 4 % ophthalmic solution 1 drop, 1 drop, Both Eyes, Q3H PRN, KATHY Contreras, 1 drop at 03/05/19 5965    Laboratory Results:  Results from last 7 days   Lab Units 03/05/19  0638 03/04/19  0449   CK TOTAL U/L 31* 29*       CBC with diff:   Results from last 7 days   Lab Units 03/08/19  0922 03/08/19  0430 03/07/19  2138 03/07/19  0505 03/06/19  2129 03/06/19  1309 03/06/19  0553  03/05/19  0638  03/04/19  0449  03/03/19  0624   WBC Thousand/uL  --  12 91*  --   --   --   --  12 11*  --  10 87*  --  13 09*  --  12 71*   HEMOGLOBIN g/dL 9 6* 9 3* 9 1* 8 3* 8 6* 8 1* 8 6*   < > 9 1*   < > 7 2*   < > 7 4*   HEMATOCRIT % 31 1* 30 4* 29 6* 26 9* 28 3* 26 8* 27 0*   < > 29 5*   < > 23 9*   < > 24 1*   MCV fL  --  97  --   --   --   --  97  --  97  --  98  --  98   PLATELETS Thousands/uL  --  173  --   --   --   --  175  --  138*  --  157  --  141*   MCH pg  --  29 6  --   --   --   --  30 8  --  30 0  --  29 6  --  30 2   MCHC g/dL  --  30 6*  --   --   --   --  31 9  --  30 8*  --  30 1*  --  30 7*   RDW %  --  15 9*  --   --   --   --  15 5*  --  15 9*  --  15 9*  --  15 9*   MPV fL  --  11 6  --   --   --   --  11 7  --  12 1  --  11 4  --  11 2   NRBC AUTO /100 WBCs  --  0  --   --   --   --  0  --  0  --  0  --  0    < > = values in this interval not displayed         CMP:  Results from last 7 days   Lab Units 03/08/19  0430 03/07/19  0500 03/06/19  0552 03/05/19  5399 03/04/19  0449 03/03/19  0624   POTASSIUM mmol/L 3 5 3 7 3 5 4 0 3 4* 3 2*   CHLORIDE mmol/L 98* 101 101 101 101 102   CO2 mmol/L 31 26 28 24 27 29   BUN mg/dL 24 31* 23 35* 29* 21   CREATININE mg/dL 3 34* 3 91* 3 19* 4 39* 3 57* 2 87*   CALCIUM mg/dL 7 1* 7 2* 7 0* 7 1* 6 9* 6 8*   AST U/L  --   --   --   --  10  --    ALT U/L  --   --   --   --  <6*  --    ALK PHOS U/L  --   --   --   --  78  --    EGFR ml/min/1 73sq m 19 16 20 14 18 23       BMP:  Results from last 7 days   Lab Units 19  0430 19  0500 19  0552 19  6981 19  0449 19  0624   POTASSIUM mmol/L 3 5 3 7 3 5 4 0 3 4* 3 2*   CHLORIDE mmol/L 98* 101 101 101 101 102   CO2 mmol/L 31 26 28 24 27 29   BUN mg/dL 24 31* 23 35* 29* 21   CREATININE mg/dL 3 34* 3 91* 3 19* 4 39* 3 57* 2 87*   CALCIUM mg/dL 7 1* 7 2* 7 0* 7 1* 6 9* 6 8*       NT-proBNP: No results for input(s): NTBNP in the last 72 hours       Magnesium:   Results from last 7 days   Lab Units 19  0430 19  0500 19  0552 19  0638 19  0449 19  0624   MAGNESIUM mg/dL 1 8 1 8 1 8 1 9 1 9 1 8       Coags:   Results from last 7 days   Lab Units 19  0217 19  1845 19  0922 19  0027 19  2138 19  1422 19  0505   PTT seconds 73* 57* >210* 111* >210* 38 86*       TSH:        Hemoglobin A1C )      Lipid Profile:   Lab Results   Component Value Date    CHOL 146 2014    CHOL 145 2014     Lab Results   Component Value Date    HDL 44 2019    HDL 41 2018    HDL 52 2017     Lab Results   Component Value Date    LDLCALC 77 2019    LDLCALC 57 2018    LDLCALC 129 (H) 2017     Lab Results   Component Value Date    LDLDIRECT 85 2014     Lab Results   Component Value Date    TRIG 129 2019    TRIG 102 2018    TRIG 71 2017       Cardiac testing:       Results for orders placed during the hospital encounter of 19   Echo complete with contrast if indicated    Narrative [address] [facility] [address] [facility] [address], [facility] [address]  [phone]    Transthoracic Echocardiogram  2D, Doppler, and Color Doppler    Study date:  2019    Patient: Kristina Mars  MR number: [id number]  Account number: [de-identified]  : 1960  Age: 60 years  Gender: Male  Status: Inpatient  Location: Bedside  Height: 72 in  Weight: 339 lb  BP: 89/ 54 mmHg    Indications: chest pain    Diagnoses: R07 9 - Chest pain, unspecified    Sonographer:  Cassy Montana RD, CCT  Primary Physician:  Vee Albarran DO  Referring Physician:  Yanci Bhardwaj DO  Group:  [id number]  Interpreting Physician:  Christopher Ribeiro DO    SUMMARY    PROCEDURE INFORMATION:  This was a technically difficult study despite the administration of echo contrast     LEFT VENTRICLE:  Systolic function was markedly reduced  Ejection fraction was estimated to be 30 %  There was severe diffuse hypokinesis with no obvious identifiable regional variations  Wall thickness was moderately increased  Concentric hypertrophy was present  VENTRICULAR SEPTUM:  There was dyssynergic motion  RIGHT VENTRICLE:  The ventricle was mildly dilated  Systolic function was moderately to markedly reduced  LEFT ATRIUM:  The atrium was mildly dilated  RIGHT ATRIUM:  The atrium was mildly dilated  AORTIC VALVE:  A bioprosthesis was present  It was not well visualized  Mean transvalvular gradient 13 mmHg  TRICUSPID VALVE:  There was mild regurgitation  PERICARDIUM:  A small, partially loculated pericardial effusion was identified along the left ventricular free wall  HISTORY: PRIOR HISTORY: Aortic valve prosthesis    PROCEDURE: The procedure was performed at the bedside  This was a routine study  The transthoracic approach was used  The study included complete 2D imaging, complete spectral Doppler, and color Doppler  The heart rate was 80 bpm, at the  start of the study  Intravenous contrast (Definity solution [1 3 ml Definity/8 7ml normal saline solution]) was administered  Intravenous contrast (Definity solution [1 3 ml Definity/8 7ml normal saline solution]) was administered to  opacify the left ventricle and enhance Doppler signals  Echocardiographic views were limited due to low windows and patient on mechanical ventilator   This was a technically difficult study despite the administration of echo contrast     LEFT VENTRICLE: Size was normal  Systolic function was markedly reduced  Ejection fraction was estimated to be 30 %  There was severe diffuse hypokinesis with no obvious identifiable regional variations  Wall thickness was moderately  increased  Concentric hypertrophy was present  DOPPLER: Normal sinus rhythm was absent  The study was not technically sufficient to allow evaluation of LV diastolic function  VENTRICULAR SEPTUM: There was dyssynergic motion  RIGHT VENTRICLE: The ventricle was mildly dilated  Systolic function was moderately to markedly reduced  LEFT ATRIUM: The atrium was mildly dilated  RIGHT ATRIUM: The atrium was mildly dilated  MITRAL VALVE: Not well visualized  DOPPLER: The transmitral velocity was within the normal range  There was no evidence for stenosis  There was no significant regurgitation  AORTIC VALVE: A bioprosthesis was present  It was not well visualized  Mean transvalvular gradient 13 mmHg  DOPPLER: There was no significant regurgitation  TRICUSPID VALVE: The valve structure was normal  There was normal leaflet separation  DOPPLER: The transtricuspid velocity was within the normal range  There was no evidence for stenosis  There was mild regurgitation  The tricuspid jet  envelope definition was inadequate for estimation of RV systolic pressure  PULMONIC VALVE: Leaflets exhibited normal thickness, no calcification, and normal cuspal separation  DOPPLER: The transpulmonic velocity was within the normal range  There was no significant regurgitation  PERICARDIUM: A small, partially loculated pericardial effusion was identified along the left ventricular free wall  The pericardium was normal in appearance  AORTA: The root exhibited normal size      SYSTEM MEASUREMENT TABLES    2D  %FS: 15 83 %  Ao Diam: 3 09 cm  EDV(Teich): 221 88 ml  EF(Teich): 32 54 %  ESV(Teich): 149 69 ml  IVSd: 1 59 cm  LA Diam: 5 18 cm  LVIDd: 6 58 cm  LVIDs: 5 54 cm  LVPWd: 1 43 cm  SV(Teich): 72 19 ml    CW  AV Env  Ti: 233 56 ms  AV VTI: 49 77 cm  AV Vmax: 2 64 m/s  AV Vmax: 2 67 m/s  AV Vmean: 2 13 m/s  AV maxP 95 mmHg  AV maxP 53 mmHg  AV meanP 73 mmHg    PW  LVOT Env  Ti: 215 22 ms  LVOT VTI: 11 41 cm  LVOT Vmax: 0 59 m/s  LVOT Vmax: 0 7 m/s  LVOT Vmean: 0 52 m/s  LVOT maxP 78 mmHg  LVOT maxP mmHg  LVOT meanP 24 mmHg  MV E Deep: 1 01 m/s    [facility]redited [facility]chocardiography Laboratory    Prepared and electronically signed by    Alissa Ramos DO  Signed 2019 10:14:55       Results for orders placed during the hospital encounter of 19   [facility]    Narrative [facility] [place] [address]  [facility], [address] [facility]  [phone]    Transesophageal Echocardiogram  2D, Doppler, and Color Doppler    Study date:  2019    Patient: Patricia Bhatia  MR number: [id number]  Account number: [de-identified]  : 1960  Age: 60 years  Gender: Male  Status: Inpatient  Location: Bedside  Height: 69 in  Weight: 319 lb  BP: 101/ 54 mmHg    Indications: Bacteremia  Diagnoses: R78 81 - Bacteremia    Sonographer:  Valentin Pires RDCS  Interpreting Physician:  Lea Montgomery DO  Primary Physician:  Jose Duffy DO  Referring Physician:  Zoie Seth DO  Group:  [facility]  Cardiology Fellow:  Brenna Antonio MD    IMPRESSIONS:  There was no echocardiographic evidence for valvular vegetation  SUMMARY    LEFT VENTRICLE:  Systolic function was moderately reduced  Ejection fraction was estimated to be 40 %  There was moderate diffuse hypokinesis  Wall thickness was mildly increased  There was mild concentric hypertrophy  RIGHT VENTRICLE:  The size was normal   Systolic function was mildly reduced  LEFT ATRIUM:  The atrium was mildly dilated      ATRIAL SEPTUM:  There was a small patent foramen ovale with left to right shunt identified by color Doppler  RIGHT ATRIUM:  The atrium was mildly dilated  MITRAL VALVE:  There was trace regurgitation  There was no echocardiographic evidence of vegetation  AORTIC VALVE:  A bioprosthesis was present  It exhibited normal function  There was no echocardiographic evidence of vegetation  TRICUSPID VALVE:  There was mild regurgitation  There was no echocardiographic evidence of vegetation  HISTORY: PRIOR HISTORY: Aortic valve replacement, NSTEMI, Cardiomyopathy, Acute kidney injury  PROCEDURE: The procedure was performed at the bedside  This was a routine study  The risks and alternatives of the procedure were explained to the patient's next of kin and informed consent was obtained  The transesophageal approach was  used  The study included complete 2D imaging, complete spectral Doppler, and color Doppler  The heart rate was 113 bpm, at the start of the study  An adult omniplane probe was inserted by the cardiology fellow under direct supervision of  the attending cardiologist  Intubated with ease  One intubation attempt(s)  There was no blood detected on the probe  Image quality was adequate  There were no complications during the procedure  MEDICATIONS: Sedation administered by  bedside nurse  LEFT VENTRICLE: Size was normal  Systolic function was moderately reduced  Ejection fraction was estimated to be 40 %  There was moderate diffuse hypokinesis  Wall thickness was mildly increased  There was mild concentric hypertrophy  DOPPLER: The study was not technically sufficient to allow evaluation of LV diastolic function  RIGHT VENTRICLE: The size was normal  Systolic function was mildly reduced  Wall thickness was normal     LEFT ATRIUM: The atrium was mildly dilated  No thrombus was identified  APPENDAGE: The appendage was small  No thrombus was identified  DOPPLER: The function was normal (normal emptying velocity)      ATRIAL SEPTUM: There was a small patent foramen ovale with left to right shunt identified by color Doppler  RIGHT ATRIUM: The atrium was mildly dilated  No thrombus was identified  MITRAL VALVE: Valve structure was normal  There was normal leaflet separation  There was no echocardiographic evidence of vegetation  DOPPLER: There was trace regurgitation  AORTIC VALVE: A bioprosthesis was present  It exhibited normal function  There was no echocardiographic evidence of vegetation  TRICUSPID VALVE: The valve structure was normal  There was normal leaflet separation  There was no echocardiographic evidence of vegetation  DOPPLER: There was mild regurgitation  PULMONIC VALVE: Leaflets exhibited normal thickness, no calcification, and normal cuspal separation  There was no echocardiographic evidence of vegetation  DOPPLER: There was no significant regurgitation  PERICARDIUM: There was no pericardial effusion seen in the images obtained  AORTA: The root exhibited normal size  There was no atheroma  There was no evidence for dissection  There was no evidence for aneurysm  [facility]    Prepared and electronically signed by    Jayne Jasso DO  Signed 25-Jan-2019 14:01:14       No results found for this or any previous visit  No results found for this or any previous visit  No results found for this or any previous visit  No results found for this or any previous visit  Delbert Roque MD    Portions of the record may have been created with voice recognition software  Occasional wrong word or "sound a like" substitutions may have occurred due to the inherent limitations of voice recognition software  Read the chart carefully and recognize, using context, where substitutions have occurred

## 2019-03-09 NOTE — PROGRESS NOTES
Progress Note - Infectious Disease   Kofi Heath 60 y o  male MRN: [id number]  Unit/Bed#: [facility] 834-01 Encounter: [id number]      Impression/Plan:  1  MSSA bacteremia:  Possibility of endocarditis considered in the setting of bioprosthetic AVR  Levie Smoker no evidence of valvular vegetations   Initial portal of entry may have been related to multiple upper back wounds   CT chest/abdomen/pelvis with no other evidence of acute infection   Possible from pneumonia as sputum culture was positive for MSSA   Bacteremia eventually cleared   Podiatry evaluated patient's feet and no acute issues   Neurology evaluation noted with prior changes in mental status, imaging abnormalities felt to be ischemic and similar compared to prior studies   No plan for MRI   Patient improved significantly after recent GI bleed       -continue ceftaroline  -prolonged course of IV antibiotics of at least 6 weeks through 3/10 for this issue  -monitor temperature/WBC  -send blood cultures if patient spikes fever     2  Rash and peripheral eosinophilia:  Patient recently developed rash  It was noted to be flat and pruritic  Absolute eosinophil count elevated   Likely due to amiodarone as the patient tolerated rechallenge with Ancef and there was no difference in the rash when patient was switched to vancomycin empirically   I suspect that this will be a prolonged elevation given the half-life of amiodarone   Patient again noted with rash and itching after transfer out of ICU   Antibiotic therapy changed only to vancomycin and the patient's absolute eosinophilia increased  Unfortunately with multiple drugs causing his elevation in eosinophilia make it difficult to determine the actual cause  At this point the patient does require antibiotics and so will attempt to continue as tolerated       -monitor WBC  -continue to trend fever curve/vitals  -continue ceftaroline and monitor response     3   Left forearm myositis/cellulitis:  Patient was noted to have large ecchymosis his left forearm after transition from the ICU   This was thought to be due to issues with obtaining access during his acute decline   The ecchymosis has largely resolved however the patient's arm remains swollen more so than his other extremities and painful   CT imaging of the arm was done and findings are notable for soft tissue edema and suggestion of myositis/cellulitis   No obvious collections seen though study limited without contrast   Vascular study with superficial thrombophlebitis in the arm   The patient fortunately remains hemodynamically stable with persistent leukocytosis   Repeat CT scan reviewed and surgical evaluation appreciated   Duplex study without acute change   The arm itself appears softer and less painful on subsequent exam      -continue ceftaroline for now  -continue to monitor CBC  -continue to trend fever curve/vitals  -serial exams  -ongoing supportive care as per primary   -treat for total of 14 days for possible myositis through 3/16     4  History of bioprosthetic AVR   Secondary to degenerative AS   Placed in July 2014  Ruben Mendez no evidence of endocarditis   Ongoing follow-up by Cardiology      5  Acute kidney injury   Likely ischemic/septic ATN    Patient remains on hemodialysis   He is status post PermCath placement      -ongoing follow-up by Nephrology  -antibiotics re-dose for intermittent HD      6  Leukocytosis:  WBC  elevated   Patient continues to improve clinically   This may be due now to problem 3 and problem 2 may be contributing      -continue to monitor fever curve/vitals  -continue monitor CBC  -continue antibiotics as above     Above plan discussed in detail with patient      ID consult service will continue to follow      Antibiotics:  Ceftaroline    24 hour events:  No Acute events noted overnight on chart review  Patient is currently afebrile  White blood cell count 10 3; eosinophil count declined  Patient's other vitals are stable    Subjective:  Patient seen during dialysis today and reports that the pain in his arm continues to improve  He continues to have some itching all over his skin  He otherwise denies having any nausea, vomiting, chest pain or shortness of breath  Objective:  Vitals:  Temp:  [98 2 °F (36 8 °C)-99 3 °F (37 4 °C)] 98 5 °F (36 9 °C)  HR:  [70-75] 72  Resp:  [18-20] 18  BP: ()/(52-66) 97/52  SpO2:  [90 %-100 %] 96 %  Temp (24hrs), Av 5 °F (36 9 °C), Min:98 2 °F (36 8 °C), Max:99 3 °F (37 4 °C)  Current: Temperature: 98 5 °F (36 9 °C)    Physical Exam:   General Appearance:  Alert, interactive, nontoxic, no acute distress  Chronically ill-appearing  Throat: Oropharynx moist without lesions  Lungs:   Clear to auscultation anteriorly bilaterally; no wheezes, rhonchi or rales; respirations unlabored on room air   Heart:  RRR; no murmur, rub or gallop appreciated   Abdomen:   Soft, non-tender, non-distended, positive bowel sounds  Extremities: No clubbing, cyanosis; decreasing edema in all of his extremities  The left forearm does though remained more swollen than the right  Skin: No new rashes or lesions  No new draining wounds noted  Patient's skin erythema persists on his abdomen his arms and part of his chest   It does not seem to have spread  His left forearm again appears more soft and malleable today  There is no progressing erythema or area of induration or fluctuance in the left forearm  Labs, Imaging, & Other studies:   All pertinent labs and imaging studies were personally reviewed  Results from last 7 days   Lab Units 19  0937 19  0922 19  0430  19  0553   WBC Thousand/uL 10 33*  --  12 91*  --  12 11*   HEMOGLOBIN g/dL 8 0* 9 6* 9 3*   < > 8 6*   PLATELETS Thousands/uL 135*  --  173  --  175    < > = values in this interval not displayed       Results from last 7 days   Lab Units 19  0430  19  0449   POTASSIUM mmol/L 3 5   < > 3 4* CHLORIDE mmol/L 98*   < > 101   CO2 mmol/L 31   < > 27   BUN mg/dL 24   < > 29*   CREATININE mg/dL 3 34*   < > 3 57*   EGFR ml/min/1 73sq m 19   < > 18   CALCIUM mg/dL 7 1*   < > 6 9*   AST U/L  --   --  10   ALT U/L  --   --  <6*   ALK PHOS U/L  --   --  78    < > = values in this interval not displayed

## 2019-03-09 NOTE — HEMODIALYSIS
Patient received 4 hours of HD tx today  No problems with HD cath  Approx 3,000mL removed today  Post treatment weight of 161 5kg  IV antibiotic administered as prescribed

## 2019-03-09 NOTE — PROGRESS NOTES
[facility]ist Service - Internal Medicine Progress Note       PATIENT INFORMATION      Patient: Delfino Raymond 60 y o  male   MRN: [id number]  PCP: Frances Aguilar, DO  Unit/Bed#: [facility] 834-01 Encounter: [id number]  Date Of Visit: 03/09/19       ASSESSMENTS & PLAN       MSSA bacteremia - Resolved septic shock  Assessment & Plan  - CHON negative for endocarditis  - antibiotic regimen per Infectious Disease to complete at least a six week course    Cellulitis/myositis of left forearm  Assessment & Plan  - appreciate Infectious Disease input - currently on IV Teflaro due to previous rash with associated eosinophilia  - CT of forearm on 3/5 noted per radiologist to be concerning and read as follows: "Extensive subcutaneous edema throughout the forearm with associated radial muscle edema concerning for nonspecific cellulitis and myositis  Additionally, there is deep fascial edema noted along the radial soft tissues of the proximal forearm raising suspicion for necrotizing fasciitis without onelia soft tissue emphysema at this time  No drainable abscess collection    No underlying osseous destructive changes to indicate osteomyelitis "  - CPK normalized previously - appreciate general surgery who did not feel this is necrotizing fasciitis and recommended a repeat LUE venous doppler to assess progression of DVT which revealed no significant change from prior study with active presence of nonocclusive left IJ DVT along with nonocclusive superficial thrombophlebitis from the cephalic vein down to the antecubital fossa   - continue PRN pain control   - will appreciate case management input regarding likelihood of need for skilled rehab placement    Acute renal failure  Assessment & Plan  - likely secondary to recent septic shock  - continue hemodialysis per nephrology who do not feel renal recovery will occur in the near future  - limit/avoid nephrotoxins as possible - home Demadex remains held    - continue Nephrocaps    Left internal jugular vein thrombus  Assessment & Plan  - remains on a heparin drip  - PRN pain control   - no significant changes on repeat doppler imaging study      Atrial flutter/fibrillation - Stress-induced cardiomyopathy   Assessment & Plan  - continue Cardizem/Lopressor regimen per cardiology - monitor heart rates specially while undergoing hemodialysis  - currently anticoagulated on heparin drip for a coexisting venous thrombus - cardiology however recommending no chronic anticoagulation and stated he may be a candidate for a Watchman device in the future if medically stable   - monitor/replete electrolytes as necessary - hypokalemia resolved  - EF improved from 30 -> 55% on repeat limited echocardiogram likely from recovering septic shock    Acute blood loss anemia - Gastric ulcer  Assessment & Plan  - s/p injection/clipping  - c/w PPI regimen  - H/H stable    Morbid obesity   Assessment & Plan  - BMI of 52 42  - lifestyle/diet modifications    Acute metabolic encephalopathy  Assessment & Plan  - waxing/waning - likely secondary to recovered septic shock in the setting of multiple medical issues  - clinical monitoring      Essential hypertension  Assessment & Plan  - continue Lopressor/Cardizem    History of aortic valve replacement with bioprosthetic valve  Assessment & Plan  - cardiology following  - no evidence of endocarditis on echocardiogram      VTE Prophylaxis:  Heparin drip       SUBJECTIVE     Seen/examined this afternoon after hemodialysis which he tolerated well  No significant overnight events  States he feels somewhat better today although still reports waxing/waning lower extremity pain  Denies any fever/chills, chest pain, or shortness of breath at this time        OBJECTIVE     Vitals:   Temp (24hrs), Av °F (36 7 °C), Min:96 6 °F (35 9 °C), Max:98 5 °F (36 9 °C)    Temp:  [96 6 °F (35 9 °C)-98 5 °F (36 9 °C)] 97 9 °F (36 6 °C)  HR:  [70-82] 70  Resp:  [18-20] 18  BP: ()/(44-66) 125/65  SpO2:  [95 %-100 %] 96 %  Body mass index is 52 35 kg/m²  Input and Output Summary (last 24 hours): Intake/Output Summary (Last 24 hours) at 3/9/2019 1539  Last data filed at 3/9/2019 1459  Gross per 24 hour   Intake 1190 ml   Output 3452 ml   Net -2262 ml       Physical Exam:     GENERAL:  Obese - weak/fatigued  HEAD:  Normocephalic - atraumatic  EYES: PERRL - EOMI   MOUTH:  Mucosa moist  NECK:  Supple - full range of motion  CARDIAC:  Regular rate/rhythm - S1/S2 positive  PULMONARY:  Diminished bibasilar breath sounds  ABDOMEN:  Soft - nontender/nondistended - active bowel sounds - anasarca noted   MUSCULOSKELETAL:  Motor strength/range of motion markedly deconditioned - LE and UE pitting edema noted    NEUROLOGIC:  Awake/alert   SKIN:  Left forearm erythema improved - chronic wrinkles/blemishes   PSYCHIATRIC:  Mood/affect stable      ADDITIONAL DATA       Labs & Recent Cultures:     Results from last 7 days   Lab Units 03/09/19  0937   WBC Thousand/uL 10 33*   HEMOGLOBIN g/dL 8 0*   HEMATOCRIT % 26 4*   PLATELETS Thousands/uL 135*   NEUTROS PCT % 66   LYMPHS PCT % 7*   MONOS PCT % 8   EOS PCT % 16*     Results from last 7 days   Lab Units 03/09/19  0935  03/04/19  0449   SODIUM mmol/L 134*   < > 136   POTASSIUM mmol/L 3 9   < > 3 4*   CHLORIDE mmol/L 98*   < > 101   CO2 mmol/L 28   < > 27   BUN mg/dL 35*   < > 29*   CREATININE mg/dL 4 40*   < > 3 57*   CALCIUM mg/dL 7 4*   < > 6 9*   ALK PHOS U/L  --   --  78   ALT U/L  --   --  <6*   AST U/L  --   --  10    < > = values in this interval not displayed           Last 24 Hours Medication List:     Current Facility-Administered Medications:  acetaminophen 650 mg Oral Q6H PRN KATHY Skinner    atorvastatin 40 mg Oral Daily With KATHY Baker    b complex-vitamin C-folic acid 1 capsule Oral Daily With Davis Vargas MD    bisacodyl 10 mg Rectal Daily PRN KATHY Skinner    ceftaroline CARBONE [facility])  mg Intravenous Q12H Carole Bush MD Last Rate: 400 mg (03/09/19 1243)   diltiazem 120 mg Oral Daily Beatriz Sat, CRNP    heparin (porcine) 3-30 Units/kg/hr (Order-Specific) Intravenous Titrated [place], PA-C Last Rate: 8 Units/kg/hr (03/09/19 1429)   heparin (porcine) 10,000 Units Intravenous PRN [place], PA-C    heparin (porcine) 5,000 Units Intravenous PRN [place], PA-C    hydrocortisone  Topical BID Kristal Bond, CRNP    magnesium oxide 400 mg Oral Daily Tonny Nelson MD    melatonin 6 mg Oral HS Leidy Martinez, HERBERT    metoprolol 5 mg Intravenous Q6H PRN Gisela Garcia MD    metoprolol tartrate 50 mg Oral TID Tonny Nelson MD    midodrine 10 mg Oral Before Dialysis Tonny Nelson MD    nystatin  Topical BID KAHTY Bang    omeprazole (PRILOSEC) suspension 2 mg/mL 20 mg Oral BID AC Kristal Bond, KATHY    oxyCODONE 5 mg Oral Q4H PRN Gisela Garcia MD    oxyCODONE 7 5 mg Oral Q4H PRN Gisela Garcia MD    polyvinyl alcohol 1 drop Both Eyes Q3H PRN KATHY Bang           Time Spent for Care: 33 minutes  More than 50% of total time spent on counseling and coordination of care as described above  Current Length of Stay: 44 day(s)      Code Status: Level 1 - Full Code        ** Please Note: This note is constructed using a voice recognition dictation system  An occasional wrong word/phrase or sound-a-like substitution may have been picked up by dictation device due to the inherent limitations of voice recognition software  Read the chart carefully and recognize, using reasonable context, where substitutions may have occurred  **

## 2019-03-10 LAB
ANION GAP SERPL CALCULATED.3IONS-SCNC: 7 MMOL/L (ref 4–13)
APTT PPP: 32 SECONDS (ref 26–38)
APTT PPP: 49 SECONDS (ref 26–38)
APTT PPP: 73 SECONDS (ref 26–38)
BUN SERPL-MCNC: 22 MG/DL (ref 5–25)
CALCIUM SERPL-MCNC: 7.5 MG/DL (ref 8.3–10.1)
CHLORIDE SERPL-SCNC: 98 MMOL/L (ref 100–108)
CO2 SERPL-SCNC: 28 MMOL/L (ref 21–32)
CREAT SERPL-MCNC: 3.37 MG/DL (ref 0.6–1.3)
GFR SERPL CREATININE-BSD FRML MDRD: 19 ML/MIN/1.73SQ M
GLUCOSE SERPL-MCNC: 81 MG/DL (ref 65–140)
GLUCOSE SERPL-MCNC: 81 MG/DL (ref 65–140)
HBV CORE AB SER QL: NORMAL
HBV CORE IGM SER QL: NORMAL
HBV SURFACE AB SER-ACNC: 26.38 MIU/ML
HBV SURFACE AG SER QL: NORMAL
HCV AB SER QL: NORMAL
MAGNESIUM SERPL-MCNC: 1.9 MG/DL (ref 1.6–2.6)
POTASSIUM SERPL-SCNC: 4 MMOL/L (ref 3.5–5.3)
SODIUM SERPL-SCNC: 133 MMOL/L (ref 136–145)

## 2019-03-10 PROCEDURE — 80048 BASIC METABOLIC PNL TOTAL CA: CPT | Performed by: INTERNAL MEDICINE

## 2019-03-10 PROCEDURE — 85730 THROMBOPLASTIN TIME PARTIAL: CPT | Performed by: SURGERY

## 2019-03-10 PROCEDURE — 99232 SBSQ HOSP IP/OBS MODERATE 35: CPT | Performed by: INTERNAL MEDICINE

## 2019-03-10 PROCEDURE — 83735 ASSAY OF MAGNESIUM: CPT | Performed by: INTERNAL MEDICINE

## 2019-03-10 PROCEDURE — 82948 REAGENT STRIP/BLOOD GLUCOSE: CPT

## 2019-03-10 PROCEDURE — 85730 THROMBOPLASTIN TIME PARTIAL: CPT | Performed by: INTERNAL MEDICINE

## 2019-03-10 RX ORDER — DILTIAZEM HYDROCHLORIDE 180 MG/1
180 CAPSULE, COATED, EXTENDED RELEASE ORAL DAILY
Status: DISCONTINUED | OUTPATIENT
Start: 2019-03-10 | End: 2019-03-16 | Stop reason: HOSPADM

## 2019-03-10 RX ADMIN — OXYCODONE HYDROCHLORIDE 5 MG: 5 TABLET ORAL at 09:56

## 2019-03-10 RX ADMIN — CEFTAROLINE FOSAMIL 400 MG: 400 POWDER, FOR SOLUTION INTRAVENOUS at 21:17

## 2019-03-10 RX ADMIN — HYDROCORTISONE: 1 CREAM TOPICAL at 17:19

## 2019-03-10 RX ADMIN — ATORVASTATIN CALCIUM 40 MG: 40 TABLET, FILM COATED ORAL at 17:18

## 2019-03-10 RX ADMIN — Medication 400 MG: at 09:56

## 2019-03-10 RX ADMIN — MELATONIN 6 MG: at 21:16

## 2019-03-10 RX ADMIN — CEFTAROLINE FOSAMIL 400 MG: 400 POWDER, FOR SOLUTION INTRAVENOUS at 09:56

## 2019-03-10 RX ADMIN — PANTOPRAZOLE SODIUM 40 MG: 40 TABLET, DELAYED RELEASE ORAL at 06:59

## 2019-03-10 RX ADMIN — PANTOPRAZOLE SODIUM 40 MG: 40 TABLET, DELAYED RELEASE ORAL at 17:18

## 2019-03-10 RX ADMIN — HEPARIN SODIUM 8 UNITS/KG/HR: 10000 INJECTION, SOLUTION INTRAVENOUS at 15:43

## 2019-03-10 RX ADMIN — OXYCODONE HYDROCHLORIDE 7.5 MG: 5 TABLET ORAL at 21:16

## 2019-03-10 RX ADMIN — METOPROLOL TARTRATE 50 MG: 50 TABLET, FILM COATED ORAL at 21:16

## 2019-03-10 RX ADMIN — NYSTATIN: 100000 POWDER TOPICAL at 09:57

## 2019-03-10 RX ADMIN — DILTIAZEM HYDROCHLORIDE 180 MG: 180 CAPSULE, COATED, EXTENDED RELEASE ORAL at 17:18

## 2019-03-10 RX ADMIN — NYSTATIN: 100000 POWDER TOPICAL at 17:19

## 2019-03-10 RX ADMIN — HYDROCORTISONE: 1 CREAM TOPICAL at 09:57

## 2019-03-10 RX ADMIN — METOPROLOL TARTRATE 50 MG: 50 TABLET, FILM COATED ORAL at 09:56

## 2019-03-10 RX ADMIN — OXYCODONE HYDROCHLORIDE 5 MG: 5 TABLET ORAL at 17:18

## 2019-03-10 RX ADMIN — Medication 1 CAPSULE: at 17:18

## 2019-03-10 NOTE — PLAN OF CARE
Problem: Potential for Falls  Goal: Patient will remain free of falls  Description  INTERVENTIONS:  - Assess patient frequently for physical needs  -  Identify cognitive and physical deficits and behaviors that affect risk of falls  -  [place] fall precautions as indicated by assessment   - Educate patient/family on patient safety including physical limitations  - Instruct patient to call for assistance with activity based on assessment  - Modify environment to reduce risk of injury  - Consider OT/PT consult to assist with strengthening/mobility    Outcome: Progressing     Problem: Prexisting or High Potential for Compromised Skin Integrity  Goal: Skin integrity is maintained or improved  Description  INTERVENTIONS:  - Identify patients at risk for skin breakdown  - Assess and monitor skin integrity  - Assess and monitor nutrition and hydration status  - Monitor labs (i e  albumin)  - Assess for incontinence   - Turn and reposition patient  - Assist with mobility/ambulation  - Relieve pressure over bony prominences  - Avoid friction and shearing  - Provide appropriate hygiene as needed including keeping skin clean and dry  - Evaluate need for skin moisturizer/barrier cream  - Collaborate with interdisciplinary team (i e  Nutrition, Rehabilitation, etc )   - Patient/family teaching   Outcome: Progressing     Problem: Nutrition/Hydration-ADULT  Goal: Nutrient/Hydration intake appropriate for improving, restoring or maintaining nutritional needs  Description  Monitor and assess patient's nutrition/hydration status for malnutrition (ex- brittle hair, bruises, dry skin, pale skin and conjunctiva, muscle wasting, smooth red tongue, and disorientation)  Collaborate with interdisciplinary team and initiate plan and interventions as ordered  Monitor patient's weight and dietary intake as ordered or per policy  Utilize nutrition screening tool and intervene per policy   Determine patient's food preferences and provide high-protein, high-caloric foods as appropriate  INTERVENTIONS:  - Monitor oral intake, urinary output, labs, and treatment plans  - Assess nutrition and hydration status and recommend course of action  - Evaluate amount of meals eaten  - Assist patient with eating if necessary   - Allow adequate time for meals  - Recommend/ encourage appropriate diets, oral nutritional supplements, and vitamin/mineral supplements  - Order, calculate, and assess calorie counts as needed  - Recommend, monitor, and adjust tube feedings and TPN/PPN based on assessed needs  - Assess need for intravenous fluids  - Provide specific nutrition/hydration education as appropriate  - Include patient/family/caregiver in decisions related to nutrition   Outcome: Progressing     Problem: CARDIOVASCULAR - ADULT  Goal: Maintains optimal cardiac output and hemodynamic stability  Description  INTERVENTIONS:  - Monitor I/O, vital signs and rhythm  - Monitor for S/S and trends of decreased cardiac output i e  bleeding, hypotension  - Administer and titrate ordered vasoactive medications to optimize hemodynamic stability  - Assess quality of pulses, skin color and temperature  - Assess for signs of decreased coronary artery perfusion - ex   Angina  - Instruct patient to report change in severity of symptoms   Outcome: Progressing  Goal: Absence of cardiac dysrhythmias or at baseline rhythm  Description  INTERVENTIONS:  - Continuous cardiac monitoring, monitor vital signs, obtain 12 lead EKG if indicated  - Administer antiarrhythmic and heart rate control medications as ordered  - Monitor electrolytes and administer replacement therapy as ordered   Outcome: Progressing     Problem: METABOLIC, FLUID AND ELECTROLYTES - ADULT  Goal: Electrolytes maintained within normal limits  Description  INTERVENTIONS:  - Monitor labs and assess patient for signs and symptoms of electrolyte imbalances  - Administer electrolyte replacement as ordered  - Monitor response to electrolyte replacements, including repeat lab results as appropriate  - Instruct patient on fluid and nutrition as appropriate   Outcome: Progressing  Goal: Fluid balance maintained  Description  INTERVENTIONS:  - Monitor labs and assess for signs and symptoms of volume excess or deficit  - Monitor I/O and WT  - Instruct patient on fluid and nutrition as appropriate   Outcome: Progressing     Problem: PAIN - ADULT  Goal: Verbalizes/displays adequate comfort level or baseline comfort level  Description  Interventions:  - Encourage patient to monitor pain and request assistance  - Assess pain using appropriate pain scale  - Administer analgesics based on type and severity of pain and evaluate response  - Implement non-pharmacological measures as appropriate and evaluate response  - Consider cultural and social influences on pain and pain management  - Notify physician/advanced practitioner if interventions unsuccessful or patient reports new pain   Outcome: Progressing     Problem: INFECTION - ADULT  Goal: Absence or prevention of progression during hospitalization  Description  INTERVENTIONS:  - Assess and monitor for signs and symptoms of infection  - Monitor lab/diagnostic results  - Monitor all insertion sites, i e  indwelling lines, tubes, and drains  - Monitor endotracheal (as able) and nasal secretions for changes in amount and color  - [place] appropriate cooling/warming therapies per order  - Administer medications as ordered  - Instruct and encourage patient and family to use good hand hygiene technique  - Identify and instruct in appropriate isolation precautions for identified infection/condition    Outcome: Progressing     Problem: SAFETY ADULT  Goal: Patient will remain free of falls  Description  INTERVENTIONS:  - Assess patient frequently for physical needs  -  Identify cognitive and physical deficits and behaviors that affect risk of falls    -  [place] fall precautions as indicated by assessment   - Educate patient/family on patient safety including physical limitations  - Instruct patient to call for assistance with activity based on assessment  - Modify environment to reduce risk of injury  - Consider OT/PT consult to assist with strengthening/mobility    Outcome: Progressing  Goal: Maintain or return to baseline ADL function  Description  INTERVENTIONS:  -  Assess patient's ability to carry out ADLs; assess patient's baseline for ADL function and identify physical deficits which impact ability to perform ADLs (bathing, care of mouth/teeth, toileting, grooming, dressing, etc )  - Assess/evaluate cause of self-care deficits   - Assess range of motion  - Assess patient's mobility; develop plan if impaired  - Assess patient's need for assistive devices and provide as appropriate  - Encourage maximum independence but intervene and supervise when necessary  ¯ Involve family in performance of ADLs  ¯ Assess for home care needs following discharge   ¯ Request OT consult to assist with ADL evaluation and planning for discharge  ¯ Provide patient education as appropriate    Outcome: Progressing  Goal: Maintain or return mobility status to optimal level  Description  INTERVENTIONS:  - Assess patient's baseline mobility status (ambulation, transfers, stairs, etc )    - Identify cognitive and physical deficits and behaviors that affect mobility  - Identify mobility aids required to assist with transfers and/or ambulation (gait belt, sit-to-stand, lift, walker, cane, etc )  - [place] fall precautions as indicated by assessment  - Record patient progress and toleration of activity level on Mobility SBAR; progress patient to next Phase/Stage  - Instruct patient to call for assistance with activity based on assessment  - Request Rehabilitation consult to assist with strengthening/weightbearing, etc     Outcome: Progressing     Problem: DISCHARGE PLANNING  Goal: Discharge to home or other facility with appropriate resources  Description  INTERVENTIONS:  - Identify barriers to discharge w/patient and caregiver  - Arrange for needed discharge resources and transportation as appropriate  - Identify discharge learning needs (meds, wound care, etc )  - Arrange for interpretive services to assist at discharge as needed  - Refer to Case Management Department for coordinating discharge planning if the patient needs post-hospital services based on physician/advanced practitioner order or complex needs related to functional status, cognitive ability, or social support system   Outcome: Progressing     Problem: Knowledge Deficit  Goal: Patient/family/caregiver demonstrates understanding of disease process, treatment plan, medications, and discharge instructions  Description  Complete learning assessment and assess knowledge base    Interventions:  - Provide teaching at level of understanding  - Provide teaching via preferred learning methods   Outcome: Progressing     Problem: GENITOURINARY - ADULT  Goal: Maintains or returns to baseline urinary function  Description  INTERVENTIONS:  - Assess urinary function  - Encourage oral fluids to ensure adequate hydration  - Administer IV fluids as ordered to ensure adequate hydration  - Administer ordered medications as needed  - Offer frequent toileting  - Follow urinary retention protocol if ordered   Outcome: Progressing  Goal: Absence of urinary retention  Description  INTERVENTIONS:  - Assess patient?s ability to void and empty bladder  - Monitor I/O  - Bladder scan as needed  - Discuss with physician/AP medications to alleviate retention as needed  - Discuss catheterization for long term situations as appropriate   Outcome: Progressing     Problem: DISCHARGE PLANNING - CARE MANAGEMENT  Goal: Discharge to post-acute care or home with appropriate resources  Description  INTERVENTIONS:  - Conduct assessment to determine patient/family and health care team treatment goals, and need for post-acute services based on payer coverage, community resources, and patient preferences, and barriers to discharge  - Address psychosocial, clinical, and financial barriers to discharge as identified in assessment in conjunction with the patient/family and health care team  - Arrange appropriate level of post-acute services according to patient's   needs and preference and payer coverage in collaboration with the physician and health care team  - Communicate with and update the patient/family, physician, and health care team regarding progress on the discharge plan  - Arrange appropriate transportation to post-acute venues  - Pt to d/c with appropriate resources when medically stable     Outcome: Progressing

## 2019-03-10 NOTE — PROGRESS NOTES
NEPHROLOGY PROGRESS NOTE   Juan Alberto Ch 60 y o  male MRN: [id number]  Unit/Bed#: [facility] 834-01 Encounter: [id number]  Reason for Consult: PATRICK/CKD    ASSESSMENT AND PLAN:  Patient is a 58-year-old male with a history of hypertension/morbid obesity/bioprosthetic aortic valve replacement who presented with shock and AK I from [facility]:  His course was complicated by MSSA bacteremia/PATRICK/GI bleeding/atrial fibrillation:  We are asked to follow for PATRICK on CKD now hemodialysis dependence after initial CRRT:     PATRICK, baseline serum creatinine 0 7 to 0 8  -PATRICK most likely secondary to ischemic/septic ATN in the setting of septic shock, another concern for AIN given peripheral eosinophilia/rash  No urine sample available for urine eosinophils  -now remains dialysis dependent since 2/1/19 on IHD  Will keep patient on TTS schedule   Continue to monitor for renal recovery   Creatinine worsening off dialysis     -urine output remains minimal  -check bladder scan for PVR  -currently has PermCath   -recent urinalysis in January 2019 showed concentrated sample, greater than 3+ proteinuria, innumerable RBCs, positive nitrate  -CT scan last month showed no hydronephrosis      Volume overload/systolic CHF  -repeat echo showed EF 55%  -still seems volume overloaded but improving, UF removal as mentioned above   -post HD weight slowly reducing with last post HD weight 153 kg  Overall weight significantly fluctuating,? Accurate      MSSA bacteremia, CHON unremarkable  -currently remains on IV antibiotic as per ID     Anemia, continue to closely monitor   Transfuse p r n  For hemoglobin less than seven   -if blood transfusion needed, would recommend all transplant precautions with leuko reduced, CMV-negative, irradiated etc   -check iron studies once infection issues are resolved      Borderline hyponatremia, Continue to monitor with dialysis      Peripheral eosinophilia/rash  -still continued to have peripheral eosinophilia   Concern for Amiodarone related versus antibiotic versus AIN   Currently remains on antibiotic which was changed by ID for MSSA bacteremia   -continue to closely monitor   -need to continue PPI due to recent GI bleed issues      ?  Myositis left upper extremity   Status post CT with IV contrast (on 3/5/19):?  Necrotizing fasciitis with nonspecific cellulitis and myositis   surgery follow-up, no intervention planned  -GI bleeding:  Secondary to pyloric junction ulcer status post EGD x2; status post injection and clipping   Currently off Carafate   -acute hypoxic respiratory failure improved   Challenge UF removal on dialysis  -stress cardiomyopathy in the setting of bacteremia   Resolved based on last echo  -Atrial flutter:  Per primary service/Cardiology  Left IJ thrombus, on heparin drip    SUBJECTIVE:  Patient seen and examined at bedside  No chest pain, shortness of breath, nausea, vomiting, abdominal pain or diarrhea    Urine output remains minimal     OBJECTIVE:  Current Weight: Weight - Scale: (!) 161 kg (354 lb 15 1 oz)  Vitals:    03/10/19 0920   BP: 102/50   Pulse: 70   Resp: 18   Temp: 98 5 °F (36 9 °C)   SpO2: 90%       Intake/Output Summary (Last 24 hours) at 3/10/2019 1141  Last data filed at 3/9/2019 1743  Gross per 24 hour   Intake 560 ml   Output 3452 ml   Net -2892 ml       Physical Examination:  General:  Lying in bed, no acute distress   Eyes:  Mild conjunctival pallor present  ENT:  External examination of ears and nose unremarkable  Neck:  Supple  Respiratory:  Bilateral air entry present  CVS:  S1, S2 present  GI:  Soft, nontender, nondistended  CNS:  Active alert oriented x3  Extremities:  2+ edema in both legs  Skin:  Peripheral rash overall improving    Medications:    Current Facility-Administered Medications:     acetaminophen (TYLENOL) tablet 650 mg, 650 mg, Oral, Q6H PRN, Kristal Bond, CRNP, 650 mg at 03/09/19 1743    atorvastatin (LIPITOR) tablet 40 mg, 40 mg, Oral, Daily With Dinner, [facility], 40 mg at 03/09/19 1742    b complex-vitamin C-folic acid (NEPHROCAPS) capsule 1 capsule, 1 capsule, Oral, Daily With Laura Rice MD, 1 capsule at 03/09/19 1742    bisacodyl (DULCOLAX) rectal suppository 10 mg, 10 mg, Rectal, Daily PRN, Lendon Aase, CRNP    ceftaroline (TEFLARO) 400 mg in sodium chloride 0 9 % 50 mL IVPB, 400 mg, Intravenous, Q12H, Dominik De Luna MD, Last Rate: 50 mL/hr at 03/10/19 0956, 400 mg at 03/10/19 0956    diltiazem (CARDIZEM CD) 24 hr capsule 120 mg, 120 mg, Oral, Daily, KATHY Smith, 120 mg at 03/09/19 1742    heparin (porcine) 25,000 units in 250 mL infusion (premix), 3-30 Units/kg/hr (Order-Specific), Intravenous, Titrated, Rosie Hancock PA-C, Last Rate: 12 9 mL/hr at 03/09/19 1429, 8 Units/kg/hr at 03/09/19 1429    heparin (porcine) injection 10,000 Units, 10,000 Units, Intravenous, PRN, Rosie Hancock PA-C, 10,000 Units at 03/07/19 1544    heparin (porcine) injection 5,000 Units, 5,000 Units, Intravenous, PRN, Rosie Hancock PA-C, 5,000 Units at 03/08/19 2011    hydrocortisone 1 % cream, , Topical, BID, KATHY Lugo    magnesium oxide (MAG-OX) tablet 400 mg, 400 mg, Oral, Daily, David Chambers MD, 400 mg at 03/10/19 0956    melatonin tablet 6 mg, 6 mg, Oral, HS, Leidy Martinez PA-C, 6 mg at 03/09/19 2225    metoprolol (LOPRESSOR) injection 5 mg, 5 mg, Intravenous, Q6H PRN, Bronwyn Sorensen MD, 5 mg at 03/02/19 1150    metoprolol tartrate (LOPRESSOR) tablet 50 mg, 50 mg, Oral, TID, David Chambers MD, 50 mg at 03/10/19 0956    midodrine (PROAMATINE) tablet 10 mg, 10 mg, Oral, Before Dialysis, David Chambers MD, 10 mg at 03/09/19 1023    nystatin (MYCOSTATIN) powder, , Topical, BID, Kristal Bond, KATHY    oxyCODONE (ROXICODONE) IR tablet 5 mg, 5 mg, Oral, Q4H PRN, Bronwyn Sorensen MD, 5 mg at 03/10/19 0956    oxyCODONE (ROXICODONE) IR tablet 7 5 mg, 7 5 mg, Oral, Q4H PRN, Bronwyn Sorensen MD, 7 5 mg at 03/09/19 1950    pantoprazole (PROTONIX) EC tablet 40 mg, 40 mg, Oral, BID AC, Suresh Eckert PA-C, 40 mg at 03/10/19 1784    polyvinyl alcohol (LIQUIFILM TEARS) 1 4 % ophthalmic solution 1 drop, 1 drop, Both Eyes, Q3H PRN, KATHY Boo, 1 drop at 03/05/19 0603    Laboratory Results:  Results from last 7 days   Lab Units 03/10/19  0705 03/09/19  0937 03/09/19  0935 03/08/19  0922 03/08/19  0430 03/07/19  2138 03/07/19  0505 03/07/19  0500 03/06/19  2129 03/06/19  1309 03/06/19  0553 03/06/19  0552  03/05/19  0638  03/04/19  0449   WBC Thousand/uL  --  10 33*  --   --  12 91*  --   --   --   --   --  12 11*  --   --  10 87*  --  13 09*   HEMOGLOBIN g/dL  --  8 0*  --  9 6* 9 3* 9 1* 8 3*  --  8 6* 8 1* 8 6*  --    < > 9 1*   < > 7 2*   HEMATOCRIT %  --  26 4*  --  31 1* 30 4* 29 6* 26 9*  --  28 3* 26 8* 27 0*  --    < > 29 5*   < > 23 9*   PLATELETS Thousands/uL  --  135*  --   --  173  --   --   --   --   --  175  --   --  138*  --  157   POTASSIUM mmol/L 4 0  --  3 9  --  3 5  --   --  3 7  --   --   --  3 5  --  4 0  --  3 4*   CHLORIDE mmol/L 98*  --  98*  --  98*  --   --  101  --   --   --  101  --  101  --  101   CO2 mmol/L 28  --  28  --  31  --   --  26  --   --   --  28  --  24  --  27   BUN mg/dL 22  --  35*  --  24  --   --  31*  --   --   --  23  --  35*  --  29*   CREATININE mg/dL 3 37*  --  4 40*  --  3 34*  --   --  3 91*  --   --   --  3 19*  --  4 39*  --  3 57*   CALCIUM mg/dL 7 5*  --  7 4*  --  7 1*  --   --  7 2*  --   --   --  7 0*  --  7 1*  --  6 9*   MAGNESIUM mg/dL 1 9  --  1 8  --  1 8  --   --  1 8  --   --   --  1 8  --  1 9  --  1 9   PHOSPHORUS mg/dL  --   --   --   --   --   --   --   --   --   --   --   --   --  4 3  --  3 5    < > = values in this interval not displayed  Results for orders placed during the hospital encounter of 01/24/19   XR chest portable    Narrative CHEST     INDICATION:   ETT placement      COMPARISON:  2/21/2019    EXAM PERFORMED/VIEWS:  XR CHEST PORTABLE      FINDINGS:      There has been interval placement of an endotracheal tube which projects approximately 3 cm above the raad  Lines and tubes are otherwise unchanged from prior exam     Heart shadow is enlarged but unchanged from prior exam     There is pulmonary vascular congestion which appears unchanged from the prior study  Osseous structures appear within normal limits for patient age  Impression Endotracheal tube in appropriate position  Cardiomegaly with stable mild pulmonary vascular congestion  Workstation performed: [id number]       No results found for this or any previous visit  No results found for this or any previous visit  No results found for this or any previous visit  No results found for this or any previous visit  No results found for this or any previous visit  Portions of the record may have been created with voice recognition software  Occasional wrong word or "sound a like" substitutions may have occurred due to the inherent limitations of voice recognition software  Read the chart carefully and recognize, using context, where substitutions have occurred

## 2019-03-10 NOTE — PLAN OF CARE
Problem: Potential for Falls  Goal: Patient will remain free of falls  Description  INTERVENTIONS:  - Assess patient frequently for physical needs  -  Identify cognitive and physical deficits and behaviors that affect risk of falls  -  [place] fall precautions as indicated by assessment   - Educate patient/family on patient safety including physical limitations  - Instruct patient to call for assistance with activity based on assessment  - Modify environment to reduce risk of injury  - Consider OT/PT consult to assist with strengthening/mobility    Outcome: Progressing     Problem: Prexisting or High Potential for Compromised Skin Integrity  Goal: Skin integrity is maintained or improved  Description  INTERVENTIONS:  - Identify patients at risk for skin breakdown  - Assess and monitor skin integrity  - Assess and monitor nutrition and hydration status  - Monitor labs (i e  albumin)  - Assess for incontinence   - Turn and reposition patient  - Assist with mobility/ambulation  - Relieve pressure over bony prominences  - Avoid friction and shearing  - Provide appropriate hygiene as needed including keeping skin clean and dry  - Evaluate need for skin moisturizer/barrier cream  - Collaborate with interdisciplinary team (i e  Nutrition, Rehabilitation, etc )   - Patient/family teaching   Outcome: Progressing     Problem: Nutrition/Hydration-ADULT  Goal: Nutrient/Hydration intake appropriate for improving, restoring or maintaining nutritional needs  Description  Monitor and assess patient's nutrition/hydration status for malnutrition (ex- brittle hair, bruises, dry skin, pale skin and conjunctiva, muscle wasting, smooth red tongue, and disorientation)  Collaborate with interdisciplinary team and initiate plan and interventions as ordered  Monitor patient's weight and dietary intake as ordered or per policy  Utilize nutrition screening tool and intervene per policy   Determine patient's food preferences and provide high-protein, high-caloric foods as appropriate  INTERVENTIONS:  - Monitor oral intake, urinary output, labs, and treatment plans  - Assess nutrition and hydration status and recommend course of action  - Evaluate amount of meals eaten  - Assist patient with eating if necessary   - Allow adequate time for meals  - Recommend/ encourage appropriate diets, oral nutritional supplements, and vitamin/mineral supplements  - Order, calculate, and assess calorie counts as needed  - Recommend, monitor, and adjust tube feedings and TPN/PPN based on assessed needs  - Assess need for intravenous fluids  - Provide specific nutrition/hydration education as appropriate  - Include patient/family/caregiver in decisions related to nutrition   Outcome: Progressing     Problem: CARDIOVASCULAR - ADULT  Goal: Maintains optimal cardiac output and hemodynamic stability  Description  INTERVENTIONS:  - Monitor I/O, vital signs and rhythm  - Monitor for S/S and trends of decreased cardiac output i e  bleeding, hypotension  - Administer and titrate ordered vasoactive medications to optimize hemodynamic stability  - Assess quality of pulses, skin color and temperature  - Assess for signs of decreased coronary artery perfusion - ex   Angina  - Instruct patient to report change in severity of symptoms   Outcome: Progressing  Goal: Absence of cardiac dysrhythmias or at baseline rhythm  Description  INTERVENTIONS:  - Continuous cardiac monitoring, monitor vital signs, obtain 12 lead EKG if indicated  - Administer antiarrhythmic and heart rate control medications as ordered  - Monitor electrolytes and administer replacement therapy as ordered   Outcome: Progressing     Problem: METABOLIC, FLUID AND ELECTROLYTES - ADULT  Goal: Electrolytes maintained within normal limits  Description  INTERVENTIONS:  - Monitor labs and assess patient for signs and symptoms of electrolyte imbalances  - Administer electrolyte replacement as ordered  - Monitor response to electrolyte replacements, including repeat lab results as appropriate  - Instruct patient on fluid and nutrition as appropriate   Outcome: Progressing  Goal: Fluid balance maintained  Description  INTERVENTIONS:  - Monitor labs and assess for signs and symptoms of volume excess or deficit  - Monitor I/O and WT  - Instruct patient on fluid and nutrition as appropriate   Outcome: Progressing     Problem: PAIN - ADULT  Goal: Verbalizes/displays adequate comfort level or baseline comfort level  Description  Interventions:  - Encourage patient to monitor pain and request assistance  - Assess pain using appropriate pain scale  - Administer analgesics based on type and severity of pain and evaluate response  - Implement non-pharmacological measures as appropriate and evaluate response  - Consider cultural and social influences on pain and pain management  - Notify physician/advanced practitioner if interventions unsuccessful or patient reports new pain   Outcome: Progressing     Problem: INFECTION - ADULT  Goal: Absence or prevention of progression during hospitalization  Description  INTERVENTIONS:  - Assess and monitor for signs and symptoms of infection  - Monitor lab/diagnostic results  - Monitor all insertion sites, i e  indwelling lines, tubes, and drains  - Monitor endotracheal (as able) and nasal secretions for changes in amount and color  - [place] appropriate cooling/warming therapies per order  - Administer medications as ordered  - Instruct and encourage patient and family to use good hand hygiene technique  - Identify and instruct in appropriate isolation precautions for identified infection/condition    Outcome: Progressing     Problem: SAFETY ADULT  Goal: Patient will remain free of falls  Description  INTERVENTIONS:  - Assess patient frequently for physical needs  -  Identify cognitive and physical deficits and behaviors that affect risk of falls    -  [place] fall precautions as indicated by assessment   - Educate patient/family on patient safety including physical limitations  - Instruct patient to call for assistance with activity based on assessment  - Modify environment to reduce risk of injury  - Consider OT/PT consult to assist with strengthening/mobility    Outcome: Progressing  Goal: Maintain or return to baseline ADL function  Description  INTERVENTIONS:  -  Assess patient's ability to carry out ADLs; assess patient's baseline for ADL function and identify physical deficits which impact ability to perform ADLs (bathing, care of mouth/teeth, toileting, grooming, dressing, etc )  - Assess/evaluate cause of self-care deficits   - Assess range of motion  - Assess patient's mobility; develop plan if impaired  - Assess patient's need for assistive devices and provide as appropriate  - Encourage maximum independence but intervene and supervise when necessary  ¯ Involve family in performance of ADLs  ¯ Assess for home care needs following discharge   ¯ Request OT consult to assist with ADL evaluation and planning for discharge  ¯ Provide patient education as appropriate    Outcome: Progressing  Goal: Maintain or return mobility status to optimal level  Description  INTERVENTIONS:  - Assess patient's baseline mobility status (ambulation, transfers, stairs, etc )    - Identify cognitive and physical deficits and behaviors that affect mobility  - Identify mobility aids required to assist with transfers and/or ambulation (gait belt, sit-to-stand, lift, walker, cane, etc )  - [place] fall precautions as indicated by assessment  - Record patient progress and toleration of activity level on Mobility SBAR; progress patient to next Phase/Stage  - Instruct patient to call for assistance with activity based on assessment  - Request Rehabilitation consult to assist with strengthening/weightbearing, etc     Outcome: Progressing     Problem: DISCHARGE PLANNING  Goal: Discharge to home or other facility with appropriate resources  Description  INTERVENTIONS:  - Identify barriers to discharge w/patient and caregiver  - Arrange for needed discharge resources and transportation as appropriate  - Identify discharge learning needs (meds, wound care, etc )  - Arrange for interpretive services to assist at discharge as needed  - Refer to Case Management Department for coordinating discharge planning if the patient needs post-hospital services based on physician/advanced practitioner order or complex needs related to functional status, cognitive ability, or social support system   Outcome: Progressing     Problem: Knowledge Deficit  Goal: Patient/family/caregiver demonstrates understanding of disease process, treatment plan, medications, and discharge instructions  Description  Complete learning assessment and assess knowledge base    Interventions:  - Provide teaching at level of understanding  - Provide teaching via preferred learning methods   Outcome: Progressing     Problem: GENITOURINARY - ADULT  Goal: Maintains or returns to baseline urinary function  Description  INTERVENTIONS:  - Assess urinary function  - Encourage oral fluids to ensure adequate hydration  - Administer IV fluids as ordered to ensure adequate hydration  - Administer ordered medications as needed  - Offer frequent toileting  - Follow urinary retention protocol if ordered   Outcome: Progressing  Goal: Absence of urinary retention  Description  INTERVENTIONS:  - Assess patient?s ability to void and empty bladder  - Monitor I/O  - Bladder scan as needed  - Discuss with physician/AP medications to alleviate retention as needed  - Discuss catheterization for long term situations as appropriate   Outcome: Progressing     Problem: DISCHARGE PLANNING - CARE MANAGEMENT  Goal: Discharge to post-acute care or home with appropriate resources  Description  INTERVENTIONS:  - Conduct assessment to determine patient/family and health care team treatment goals, and need for post-acute services based on payer coverage, community resources, and patient preferences, and barriers to discharge  - Address psychosocial, clinical, and financial barriers to discharge as identified in assessment in conjunction with the patient/family and health care team  - Arrange appropriate level of post-acute services according to patient's   needs and preference and payer coverage in collaboration with the physician and health care team  - Communicate with and update the patient/family, physician, and health care team regarding progress on the discharge plan  - Arrange appropriate transportation to post-acute venues  - Pt to d/c with appropriate resources when medically stable     Outcome: Progressing

## 2019-03-10 NOTE — PROGRESS NOTES
General Cardiology Progress Note   Janelle Kim 60 y o  male MRN: [id number]  Unit/Bed#: [facility] 834-01 Encounter: [id number]      Assessment:  Principal Problem:    MSSA bacteremia - Resolved septic shock  Active Problems:    Essential hypertension    Stress-induced cardiomyopathy    Acute respiratory failure with hypoxia (HCC)    History of aortic valve replacement with bioprosthetic valve    Persistent atrial fibrillation (HCC)    Thrombocytopenia (HCC)    Acute blood loss anemia - Gastric ulcer    Cerebrovascular accident ([address] )    Acute metabolic encephalopathy    Skin rash    Acute renal failure    Hypovolemic shock (HCC)    GI bleed    Left arm swelling    Deep tissue injury    Dysphagia    Cellulitis/myositis of left forearm    Left internal jugular vein thrombus    Atrial flutter/fibrillation - Stress-induced cardiomyopathy     Morbid obesity       Impression:     AFib/flutter  o Heart rates very well controlled in the 70s, currently flutter with 4-1 conduction  o Would not rec anticoagulation long term for atrial fib/flutter due to bleeding risk   Hypotension  o Resolved   Acute kidney injury  o Dialysis dependent  o Issues with afib with RVR at HD continues to be an issue   Anasarca  o Volume management by dialysis   Recent GI bleed   History of AVR   Anemia   Left IJ thrombus  o On heparin drip per the primary team   Recent stress myopathy in the setting of sepsis and bacteremia, recovered   Left bundle branch block   MSSA bacteremia/left upper extremity cellulitis-antibiotics completed today    Plan:     Increase Cardizem CD to 180mg PM, tolerating slight low BP   Prn use of midodrine before HD    Subjective:   Patient seen and examined  AFib/a flutter controlled except at HD  BP stable    ROS:     LUE pain and swelling but slight improvement   No palp, CP, SOB    Objective   Vitals: Blood pressure 104/57, pulse 70, temperature 97 9 °F (36 6 °C), temperature source Oral, resp   rate 18, height 5' 9" (1 753 m), weight (!) 161 kg (354 lb 15 1 oz), SpO2 96 %  , Body mass index is 52 42 kg/m² , I/O last 3 completed shifts: In: 1210 [P O :710; I V :500]  Out: 9260 [Other:3452]  No intake/output data recorded  Wt Readings from Last 3 Encounters:   03/10/19 (!) 161 kg (354 lb 15 1 oz)   01/24/19 (!) 154 kg (339 lb 8 1 oz)   06/11/18 (!) 155 kg (341 lb)       Intake/Output Summary (Last 24 hours) at 3/10/2019 1402  Last data filed at 3/9/2019 1743  Gross per 24 hour   Intake 260 ml   Output    Net 260 ml     I/O last 3 completed shifts: In: 1210 [P O :710;  I V :500]  Out: 0508 [Other:3452]    Telemetry shows aflutter with 4:1 conduction except RVR at HD    Physical Exam:    GEN: Jenifer Mon appears aaox3, NAD  NECK:  + JVD  HEART:  Rrr, no murmur  LUNGS: CTAB, no rales  ABDOMEN: soft, nt  EXTREMITIES:  3+ edema    Meds/Allergies   Allergies   Allergen Reactions    Amiodarone      Developed Rash    Penicillins Rash     However, has subsequently tolerated Cefazolin and Cefepime       Current Facility-Administered Medications:     acetaminophen (TYLENOL) tablet 650 mg, 650 mg, Oral, Q6H PRN, Kristal Varun, CRNP, 650 mg at 03/09/19 1743    atorvastatin (LIPITOR) tablet 40 mg, 40 mg, Oral, Daily With Dinner, StartMe, CRNP, 40 mg at 03/09/19 1742    b complex-vitamin C-folic acid (NEPHROCAPS) capsule 1 capsule, 1 capsule, Oral, Daily With Monica Delgado MD, 1 capsule at 03/09/19 1742    bisacodyl (DULCOLAX) rectal suppository 10 mg, 10 mg, Rectal, Daily PRN, [email or web address], CRNP    ceftaroline (TEFLARO) 400 mg in sodium chloride 0 9 % 50 mL IVPB, 400 mg, Intravenous, Q12H, Caitlin Gaston MD, Last Rate: 50 mL/hr at 03/10/19 0956, 400 mg at 03/10/19 0956    diltiazem (CARDIZEM CD) 24 hr capsule 120 mg, 120 mg, Oral, Daily, KATHY Luque, 120 mg at 03/09/19 1742    heparin (porcine) 25,000 units in 250 mL infusion (premix), 3-30 Units/kg/hr (Order-Specific), Intravenous, Titrated, Vandana Walsh PA-C, Last Rate: 12 9 mL/hr at 03/09/19 1429, 8 Units/kg/hr at 03/09/19 1429    heparin (porcine) injection 10,000 Units, 10,000 Units, Intravenous, PRN, Vandana Walsh PA-C, 10,000 Units at 03/07/19 1544    heparin (porcine) injection 5,000 Units, 5,000 Units, Intravenous, PRN, Vandana Walsh PA-C, 5,000 Units at 03/08/19 2011    hydrocortisone 1 % cream, , Topical, BID, Kristal Bond, CRNP    magnesium oxide (MAG-OX) tablet 400 mg, 400 mg, Oral, Daily, Tara Mays MD, 400 mg at 03/10/19 0956    melatonin tablet 6 mg, 6 mg, Oral, HS, Leidy Martinez PA-C, 6 mg at 03/09/19 2225    metoprolol (LOPRESSOR) injection 5 mg, 5 mg, Intravenous, Q6H PRN, Sita Chavez MD, 5 mg at 03/02/19 1150    metoprolol tartrate (LOPRESSOR) tablet 50 mg, 50 mg, Oral, TID, Tara Mays MD, 50 mg at 03/10/19 0956    midodrine (PROAMATINE) tablet 10 mg, 10 mg, Oral, Before Dialysis, Tara Mays MD, 10 mg at 03/09/19 1023    nystatin (MYCOSTATIN) powder, , Topical, BID, Kristal Bond, CRNP    oxyCODONE (ROXICODONE) IR tablet 5 mg, 5 mg, Oral, Q4H PRN, Sita Chavez MD, 5 mg at 03/10/19 0956    oxyCODONE (ROXICODONE) IR tablet 7 5 mg, 7 5 mg, Oral, Q4H PRN, Sita Chavez MD, 7 5 mg at 03/09/19 1950    pantoprazole (PROTONIX) EC tablet 40 mg, 40 mg, Oral, BID AC, Suresh Eckert PA-C, 40 mg at 03/10/19 3154    polyvinyl alcohol (LIQUIFILM TEARS) 1 4 % ophthalmic solution 1 drop, 1 drop, Both Eyes, Q3H PRN, KATHY Ayoub, 1 drop at 03/05/19 2560    Laboratory Results:  Results from last 7 days   Lab Units 03/05/19  0638 03/04/19  0449   CK TOTAL U/L 31* 29*       CBC with diff:   Results from last 7 days   Lab Units 03/09/19  0937 03/08/19  0922 03/08/19  0430 03/07/19  2138 03/07/19  0505 03/06/19  2129 03/06/19  1309 03/06/19  0553  03/05/19  0638  03/04/19  0449   WBC Thousand/uL 10 33*  --  12 91*  --   --   --   --  12 11*  --  10 87*  --  13 09*   HEMOGLOBIN g/dL 8 0* 9 6* 9 3* 9  1* 8 3* 8 6* 8 1* 8 6*   < > 9 1*   < > 7 2*   HEMATOCRIT % 26 4* 31 1* 30 4* 29 6* 26 9* 28 3* 26 8* 27 0*   < > 29 5*   < > 23 9*   MCV fL 97  --  97  --   --   --   --  97  --  97  --  98   PLATELETS Thousands/uL 135*  --  173  --   --   --   --  175  --  138*  --  157   MCH pg 29 5  --  29 6  --   --   --   --  30 8  --  30 0  --  29 6   MCHC g/dL 30 3*  --  30 6*  --   --   --   --  31 9  --  30 8*  --  30 1*   RDW % 15 8*  --  15 9*  --   --   --   --  15 5*  --  15 9*  --  15 9*   MPV fL 11 6  --  11 6  --   --   --   --  11 7  --  12 1  --  11 4   NRBC AUTO /100 WBCs 0  --  0  --   --   --   --  0  --  0  --  0    < > = values in this interval not displayed  CMP:  Results from last 7 days   Lab Units 03/10/19  0705 03/09/19  0935 03/08/19  0430 03/07/19  0500 03/06/19  0552 03/05/19  6131 03/04/19  0449   POTASSIUM mmol/L 4 0 3 9 3 5 3 7 3 5 4 0 3 4*   CHLORIDE mmol/L 98* 98* 98* 101 101 101 101   CO2 mmol/L 28 28 31 26 28 24 27   BUN mg/dL 22 35* 24 31* 23 35* 29*   CREATININE mg/dL 3 37* 4 40* 3 34* 3 91* 3 19* 4 39* 3 57*   CALCIUM mg/dL 7 5* 7 4* 7 1* 7 2* 7 0* 7 1* 6 9*   AST U/L  --   --   --   --   --   --  10   ALT U/L  --   --   --   --   --   --  <6*   ALK PHOS U/L  --   --   --   --   --   --  78   EGFR ml/min/1 73sq m 19 14 19 16 20 14 18       BMP:  Results from last 7 days   Lab Units 03/10/19  0705 03/09/19  0935 03/08/19  0430 03/07/19  0500 03/06/19  0552 03/05/19  0638 03/04/19  0449   POTASSIUM mmol/L 4 0 3 9 3 5 3 7 3 5 4 0 3 4*   CHLORIDE mmol/L 98* 98* 98* 101 101 101 101   CO2 mmol/L 28 28 31 26 28 24 27   BUN mg/dL 22 35* 24 31* 23 35* 29*   CREATININE mg/dL 3 37* 4 40* 3 34* 3 91* 3 19* 4 39* 3 57*   CALCIUM mg/dL 7 5* 7 4* 7 1* 7 2* 7 0* 7 1* 6 9*       NT-proBNP: No results for input(s): NTBNP in the last 72 hours       Magnesium:   Results from last 7 days   Lab Units 03/10/19  0705 03/09/19  0935 03/08/19  0430 03/07/19  0500 03/06/19  7235 03/05/19  9797 03/04/19  6585 MAGNESIUM mg/dL 1 9 1 8 1 8 1 8 1 8 1 9 1 9       Coags:   Results from last 7 days   Lab Units 03/10/19  0917 03/10/19  0705 19  0840 19  0217 19  1845 19  0922 19  0027   PTT seconds 73* 32 76* 73* 57* >210* 111*       TSH:        Hemoglobin A1C )      Lipid Profile:   Lab Results   Component Value Date    CHOL 146 2014    CHOL 145 2014     Lab Results   Component Value Date    HDL 44 2019    HDL 41 2018    HDL 52 2017     Lab Results   Component Value Date    LDLCALC 77 2019    LDLCALC 57 2018    LDLCALC 129 (H) 2017     Lab Results   Component Value Date    LDLDIRECT 85 2014     Lab Results   Component Value Date    TRIG 129 2019    TRIG 102 2018    TRIG 71 2017       Cardiac testing:       Results for orders placed during the hospital encounter of 19   Echo complete with contrast if indicated    Narrative [address] [facility] [address] [facility] [address]  [phone]    Transthoracic Echocardiogram  2D, Doppler, and Color Doppler    Study date:  2019    Patient: Kera Peters  MR number: [id number]  Account number: [de-identified]  : 1960  Age: 60 years  Gender: Male  Status: Inpatient  Location: Bedside  Height: 72 in  Weight: 339 lb  BP: 89/ 54 mmHg    Indications: chest pain    Diagnoses: R07 9 - Chest pain, unspecified    Sonographer:  MADHU Hernández  Primary Physician:  Kate Newman DO  Referring Physician:  Lavern Travis DO  Group:  [facility]  Interpreting Physician:  Britt Neely DO    SUMMARY    PROCEDURE INFORMATION:  This was a technically difficult study despite the administration of echo contrast     LEFT VENTRICLE:  Systolic function was markedly reduced  Ejection fraction was estimated to be 30 %  There was severe diffuse hypokinesis with no obvious identifiable regional variations    Wall thickness was moderately increased  Concentric hypertrophy was present  VENTRICULAR SEPTUM:  There was dyssynergic motion  RIGHT VENTRICLE:  The ventricle was mildly dilated  Systolic function was moderately to markedly reduced  LEFT ATRIUM:  The atrium was mildly dilated  RIGHT ATRIUM:  The atrium was mildly dilated  AORTIC VALVE:  A bioprosthesis was present  It was not well visualized  Mean transvalvular gradient 13 mmHg  TRICUSPID VALVE:  There was mild regurgitation  PERICARDIUM:  A small, partially loculated pericardial effusion was identified along the left ventricular free wall  HISTORY: PRIOR HISTORY: Aortic valve prosthesis    PROCEDURE: The procedure was performed at the bedside  This was a routine study  The transthoracic approach was used  The study included complete 2D imaging, complete spectral Doppler, and color Doppler  The heart rate was 80 bpm, at the  start of the study  Intravenous contrast (Definity solution [1 3 ml Definity/8 7ml normal saline solution]) was administered  Intravenous contrast (Definity solution [1 3 ml Definity/8 7ml normal saline solution]) was administered to  opacify the left ventricle and enhance Doppler signals  Echocardiographic views were limited due to low windows and patient on mechanical ventilator  This was a technically difficult study despite the administration of echo contrast     LEFT VENTRICLE: Size was normal  Systolic function was markedly reduced  Ejection fraction was estimated to be 30 %  There was severe diffuse hypokinesis with no obvious identifiable regional variations  Wall thickness was moderately  increased  Concentric hypertrophy was present  DOPPLER: Normal sinus rhythm was absent  The study was not technically sufficient to allow evaluation of LV diastolic function  VENTRICULAR SEPTUM: There was dyssynergic motion  RIGHT VENTRICLE: The ventricle was mildly dilated  Systolic function was moderately to markedly reduced      LEFT ATRIUM: The atrium was mildly dilated  RIGHT ATRIUM: The atrium was mildly dilated  MITRAL VALVE: Not well visualized  DOPPLER: The transmitral velocity was within the normal range  There was no evidence for stenosis  There was no significant regurgitation  AORTIC VALVE: A bioprosthesis was present  It was not well visualized  Mean transvalvular gradient 13 mmHg  DOPPLER: There was no significant regurgitation  TRICUSPID VALVE: The valve structure was normal  There was normal leaflet separation  DOPPLER: The transtricuspid velocity was within the normal range  There was no evidence for stenosis  There was mild regurgitation  The tricuspid jet  envelope definition was inadequate for estimation of RV systolic pressure  PULMONIC VALVE: Leaflets exhibited normal thickness, no calcification, and normal cuspal separation  DOPPLER: The transpulmonic velocity was within the normal range  There was no significant regurgitation  PERICARDIUM: A small, partially loculated pericardial effusion was identified along the left ventricular free wall  The pericardium was normal in appearance  AORTA: The root exhibited normal size  SYSTEM MEASUREMENT TABLES    2D  %FS: 15 83 %  Ao Diam: 3 09 cm  EDV(Teich): 221 88 ml  EF(Teich): 32 54 %  ESV(Teich): 149 69 ml  IVSd: 1 59 cm  LA Diam: 5 18 cm  LVIDd: 6 58 cm  LVIDs: 5 54 cm  LVPWd: 1 43 cm  SV(Teich): 72 19 ml    CW  AV Env  Ti: 233 56 ms  AV VTI: 49 77 cm  AV Vmax: 2 64 m/s  AV Vmax: 2 67 m/s  AV Vmean: 2 13 m/s  AV maxP 95 mmHg  AV maxP 53 mmHg  AV meanP 73 mmHg    PW  LVOT Env  Ti: 215 22 ms  LVOT VTI: 11 41 cm  LVOT Vmax: 0 59 m/s  LVOT Vmax: 0 7 m/s  LVOT Vmean: 0 52 m/s  LVOT maxP 78 mmHg  LVOT maxP mmHg  LVOT meanP 24 mmHg  MV E Deep: 1 01 m/s    [employer] Accredited Echocardiography Laboratory    Prepared and electronically signed by    Gabbie Alcantara DO  Signed 2019 10:14:55       Results for orders placed during the hospital encounter of 19   CHON    Narrative [address]  [facility], [address] [facility]  [phone]    Transesophageal Echocardiogram  2D, Doppler, and Color Doppler    Study date:  2019    Patient: Jennifer Barton  MR number: [id number]  Account number: [de-identified]  : 1960  Age: 60 years  Gender: Male  Status: Inpatient  Location: Bedside  Height: 69 in  Weight: 319 lb  BP: 101/ 54 mmHg    Indications: Bacteremia  Diagnoses: R78 81 - Bacteremia    Sonographer:  Maddie Watts RDCS  Interpreting Physician:  Santa Parisi DO  Primary Physician:  Negar Allen DO  Referring Physician:  Anabel Castillo DO  Group:  [facility]  Cardiology Fellow:  Vahe Pérez MD    IMPRESSIONS:  There was no echocardiographic evidence for valvular vegetation  SUMMARY    LEFT VENTRICLE:  Systolic function was moderately reduced  Ejection fraction was estimated to be 40 %  There was moderate diffuse hypokinesis  Wall thickness was mildly increased  There was mild concentric hypertrophy  RIGHT VENTRICLE:  The size was normal   Systolic function was mildly reduced  LEFT ATRIUM:  The atrium was mildly dilated  ATRIAL SEPTUM:  There was a small patent foramen ovale with left to right shunt identified by color Doppler  RIGHT ATRIUM:  The atrium was mildly dilated  MITRAL VALVE:  There was trace regurgitation  There was no echocardiographic evidence of vegetation  AORTIC VALVE:  A bioprosthesis was present  It exhibited normal function  There was no echocardiographic evidence of vegetation  TRICUSPID VALVE:  There was mild regurgitation  There was no echocardiographic evidence of vegetation  HISTORY: PRIOR HISTORY: Aortic valve replacement, NSTEMI, Cardiomyopathy, Acute kidney injury  PROCEDURE: The procedure was performed at the bedside  This was a routine study   The risks and alternatives of the procedure were explained to the patient's next of kin and informed consent was obtained  The transesophageal approach was  used  The study included complete 2D imaging, complete spectral Doppler, and color Doppler  The heart rate was 113 bpm, at the start of the study  An adult omniplane probe was inserted by the cardiology fellow under direct supervision of  the attending cardiologist  Intubated with ease  One intubation attempt(s)  There was no blood detected on the probe  Image quality was adequate  There were no complications during the procedure  MEDICATIONS: Sedation administered by  bedside nurse  LEFT VENTRICLE: Size was normal  Systolic function was moderately reduced  Ejection fraction was estimated to be 40 %  There was moderate diffuse hypokinesis  Wall thickness was mildly increased  There was mild concentric hypertrophy  DOPPLER: The study was not technically sufficient to allow evaluation of LV diastolic function  RIGHT VENTRICLE: The size was normal  Systolic function was mildly reduced  Wall thickness was normal     LEFT ATRIUM: The atrium was mildly dilated  No thrombus was identified  APPENDAGE: The appendage was small  No thrombus was identified  DOPPLER: The function was normal (normal emptying velocity)  ATRIAL SEPTUM: There was a small patent foramen ovale with left to right shunt identified by color Doppler  RIGHT ATRIUM: The atrium was mildly dilated  No thrombus was identified  MITRAL VALVE: Valve structure was normal  There was normal leaflet separation  There was no echocardiographic evidence of vegetation  DOPPLER: There was trace regurgitation  AORTIC VALVE: A bioprosthesis was present  It exhibited normal function  There was no echocardiographic evidence of vegetation  TRICUSPID VALVE: The valve structure was normal  There was normal leaflet separation  There was no echocardiographic evidence of vegetation  DOPPLER: There was mild regurgitation      PULMONIC VALVE: Leaflets exhibited normal thickness, no calcification, and normal cuspal separation  There was no echocardiographic evidence of vegetation  DOPPLER: There was no significant regurgitation  PERICARDIUM: There was no pericardial effusion seen in the images obtained  AORTA: The root exhibited normal size  There was no atheroma  There was no evidence for dissection  There was no evidence for aneurysm  [address] [facility]    Prepared and electronically signed by    Eulogio Khoury DO  Signed 25-Jan-2019 14:01:14       No results found for this or any previous visit  No results found for this or any previous visit  No results found for this or any previous visit  No results found for this or any previous visit  Pako Rodrigues MD    Portions of the record may have been created with voice recognition software  Occasional wrong word or "sound a like" substitutions may have occurred due to the inherent limitations of voice recognition software  Read the chart carefully and recognize, using context, where substitutions have occurred

## 2019-03-11 LAB
ANION GAP SERPL CALCULATED.3IONS-SCNC: 6 MMOL/L (ref 4–13)
APTT PPP: 176 SECONDS (ref 26–38)
APTT PPP: 34 SECONDS (ref 26–38)
APTT PPP: 92 SECONDS (ref 26–38)
APTT PPP: 96 SECONDS (ref 26–38)
BASOPHILS # BLD AUTO: 0.04 THOUSANDS/ΜL (ref 0–0.1)
BASOPHILS NFR BLD AUTO: 1 % (ref 0–1)
BUN SERPL-MCNC: 34 MG/DL (ref 5–25)
CALCIUM SERPL-MCNC: 7.7 MG/DL (ref 8.3–10.1)
CHLORIDE SERPL-SCNC: 96 MMOL/L (ref 100–108)
CO2 SERPL-SCNC: 31 MMOL/L (ref 21–32)
CREAT SERPL-MCNC: 4.24 MG/DL (ref 0.6–1.3)
EOSINOPHIL # BLD AUTO: 1.77 THOUSAND/ΜL (ref 0–0.61)
EOSINOPHIL NFR BLD AUTO: 21 % (ref 0–6)
ERYTHROCYTE [DISTWIDTH] IN BLOOD BY AUTOMATED COUNT: 15.5 % (ref 11.6–15.1)
GFR SERPL CREATININE-BSD FRML MDRD: 14 ML/MIN/1.73SQ M
GLUCOSE SERPL-MCNC: 81 MG/DL (ref 65–140)
HCT VFR BLD AUTO: 29.9 % (ref 36.5–49.3)
HGB BLD-MCNC: 9.1 G/DL (ref 12–17)
IMM GRANULOCYTES # BLD AUTO: 0.14 THOUSAND/UL (ref 0–0.2)
IMM GRANULOCYTES NFR BLD AUTO: 2 % (ref 0–2)
LYMPHOCYTES # BLD AUTO: 0.68 THOUSANDS/ΜL (ref 0.6–4.47)
LYMPHOCYTES NFR BLD AUTO: 8 % (ref 14–44)
MAGNESIUM SERPL-MCNC: 1.9 MG/DL (ref 1.6–2.6)
MCH RBC QN AUTO: 29.6 PG (ref 26.8–34.3)
MCHC RBC AUTO-ENTMCNC: 30.4 G/DL (ref 31.4–37.4)
MCV RBC AUTO: 97 FL (ref 82–98)
MONOCYTES # BLD AUTO: 0.57 THOUSAND/ΜL (ref 0.17–1.22)
MONOCYTES NFR BLD AUTO: 7 % (ref 4–12)
NEUTROPHILS # BLD AUTO: 5.21 THOUSANDS/ΜL (ref 1.85–7.62)
NEUTS SEG NFR BLD AUTO: 61 % (ref 43–75)
NRBC BLD AUTO-RTO: 0 /100 WBCS
PLATELET # BLD AUTO: 158 THOUSANDS/UL (ref 149–390)
PMV BLD AUTO: 11.5 FL (ref 8.9–12.7)
POTASSIUM SERPL-SCNC: 3.9 MMOL/L (ref 3.5–5.3)
RBC # BLD AUTO: 3.07 MILLION/UL (ref 3.88–5.62)
SODIUM SERPL-SCNC: 133 MMOL/L (ref 136–145)
WBC # BLD AUTO: 8.41 THOUSAND/UL (ref 4.31–10.16)

## 2019-03-11 PROCEDURE — 99232 SBSQ HOSP IP/OBS MODERATE 35: CPT | Performed by: INTERNAL MEDICINE

## 2019-03-11 PROCEDURE — 99233 SBSQ HOSP IP/OBS HIGH 50: CPT | Performed by: INTERNAL MEDICINE

## 2019-03-11 PROCEDURE — 80048 BASIC METABOLIC PNL TOTAL CA: CPT | Performed by: INTERNAL MEDICINE

## 2019-03-11 PROCEDURE — 83735 ASSAY OF MAGNESIUM: CPT | Performed by: INTERNAL MEDICINE

## 2019-03-11 PROCEDURE — 85730 THROMBOPLASTIN TIME PARTIAL: CPT | Performed by: INTERNAL MEDICINE

## 2019-03-11 PROCEDURE — 85730 THROMBOPLASTIN TIME PARTIAL: CPT | Performed by: SURGERY

## 2019-03-11 PROCEDURE — 85025 COMPLETE CBC W/AUTO DIFF WBC: CPT | Performed by: INTERNAL MEDICINE

## 2019-03-11 RX ORDER — HEPARIN SODIUM 10000 [USP'U]/100ML
3-30 INJECTION, SOLUTION INTRAVENOUS
Status: DISCONTINUED | OUTPATIENT
Start: 2019-03-11 | End: 2019-03-12

## 2019-03-11 RX ADMIN — PANTOPRAZOLE SODIUM 40 MG: 40 TABLET, DELAYED RELEASE ORAL at 18:03

## 2019-03-11 RX ADMIN — CEFTAROLINE FOSAMIL 400 MG: 400 POWDER, FOR SOLUTION INTRAVENOUS at 08:20

## 2019-03-11 RX ADMIN — MELATONIN 6 MG: at 22:00

## 2019-03-11 RX ADMIN — HEPARIN SODIUM 5000 UNITS: 1000 INJECTION INTRAVENOUS; SUBCUTANEOUS at 00:34

## 2019-03-11 RX ADMIN — HYDROCORTISONE: 1 CREAM TOPICAL at 08:21

## 2019-03-11 RX ADMIN — HEPARIN SODIUM 10 UNITS/KG/HR: 10000 INJECTION, SOLUTION INTRAVENOUS at 06:37

## 2019-03-11 RX ADMIN — PANTOPRAZOLE SODIUM 40 MG: 40 TABLET, DELAYED RELEASE ORAL at 07:52

## 2019-03-11 RX ADMIN — CEFTAROLINE FOSAMIL 400 MG: 400 POWDER, FOR SOLUTION INTRAVENOUS at 21:57

## 2019-03-11 RX ADMIN — DILTIAZEM HYDROCHLORIDE 180 MG: 180 CAPSULE, COATED, EXTENDED RELEASE ORAL at 18:03

## 2019-03-11 RX ADMIN — NYSTATIN: 100000 POWDER TOPICAL at 08:21

## 2019-03-11 RX ADMIN — Medication 400 MG: at 08:20

## 2019-03-11 RX ADMIN — HEPARIN SODIUM 10000 UNITS: 1000 INJECTION INTRAVENOUS; SUBCUTANEOUS at 22:34

## 2019-03-11 RX ADMIN — NYSTATIN: 100000 POWDER TOPICAL at 18:05

## 2019-03-11 RX ADMIN — HYDROCORTISONE: 1 CREAM TOPICAL at 18:05

## 2019-03-11 RX ADMIN — METOPROLOL TARTRATE 50 MG: 50 TABLET, FILM COATED ORAL at 18:03

## 2019-03-11 RX ADMIN — ACETAMINOPHEN 650 MG: 325 TABLET, FILM COATED ORAL at 11:54

## 2019-03-11 RX ADMIN — Medication 1 CAPSULE: at 18:02

## 2019-03-11 RX ADMIN — ATORVASTATIN CALCIUM 40 MG: 40 TABLET, FILM COATED ORAL at 18:02

## 2019-03-11 NOTE — PROGRESS NOTES
[address] Hospitalist Service - Internal Medicine Progress Note       PATIENT INFORMATION      Patient: Pauline Dos Santos 60 y o  male   MRN: [id number]  PCP: Igor Andersen DO  [facility] Encounter: [id number]  Date Of Visit: 03/10/19       ASSESSMENTS & PLAN       MSSA bacteremia - Resolved septic shock  Assessment & Plan  - CHON negative for endocarditis  - antibiotic regimen per Infectious Disease to complete at least a six week course    Cellulitis/myositis of left forearm  Assessment & Plan  - appreciate Infectious Disease input - currently on IV Teflaro due to previous rash with associated eosinophilia  - CT of forearm on 3/5 noted per radiologist to be concerning and read as follows: "Extensive subcutaneous edema throughout the forearm with associated radial muscle edema concerning for nonspecific cellulitis and myositis  Additionally, there is deep fascial edema noted along the radial soft tissues of the proximal forearm raising suspicion for necrotizing fasciitis without onelia soft tissue emphysema at this time  No drainable abscess collection    No underlying osseous destructive changes to indicate osteomyelitis "  - CPK normalized previously - appreciate general surgery who did not feel this is necrotizing fasciitis and recommended a repeat LUE venous doppler to assess progression of DVT which revealed no significant change from prior study with active presence of nonocclusive left IJ DVT along with nonocclusive superficial thrombophlebitis from the cephalic vein down to the antecubital fossa   - continue PRN pain control   - case management following regarding likelihood of need for skilled rehab placement    Acute renal failure  Assessment & Plan  - likely secondary to recent septic shock  - continue hemodialysis per nephrology who do not feel renal recovery will occur in the near future  - limit/avoid nephrotoxins as possible - home Demadex remains held     - continue Nephrocaps    Left internal jugular vein thrombus  Assessment & Plan  - remains on a heparin drip  - PRN pain control   - no significant changes on repeat doppler imaging study      Atrial flutter/fibrillation - Stress-induced cardiomyopathy   Assessment & Plan  - continue Cardizem/Lopressor regimen per cardiology - monitor heart rates specially while undergoing hemodialysis  - currently anticoagulated on heparin drip for a coexisting venous thrombus - cardiology however recommending no chronic anticoagulation and stated he may be a candidate for a Watchman device in the future if medically stable   - monitor/replete electrolytes as necessary - hypokalemia resolved  - EF improved from 30 -> 55% on repeat limited echocardiogram likely from recovering septic shock    Acute blood loss anemia - Gastric ulcer  Assessment & Plan  - s/p injection/clipping  - c/w PPI regimen  - H/H stable    Morbid obesity   Assessment & Plan  - BMI of 52 42  - lifestyle/diet modifications    Acute metabolic encephalopathy  Assessment & Plan  - waxing/waning but generally improved - likely secondary to recovered septic shock in the setting of multiple medical issues  - clinical monitoring      Essential hypertension  Assessment & Plan  - continue Lopressor/Cardizem    History of aortic valve replacement with bioprosthetic valve  Assessment & Plan  - cardiology following  - no evidence of endocarditis on echocardiogram      VTE Prophylaxis:  Heparin drip       SUBJECTIVE     Seen/examined earlier today during nursing rounds  Resting comfortably in bed and states that his pain has improved more today  Still remains weak/fatigued however  No new complaints otherwise        OBJECTIVE     Vitals:   Temp (24hrs), Av 2 °F (36 8 °C), Min:97 6 °F (36 4 °C), Max:98 5 °F (36 9 °C)    Temp:  [97 6 °F (36 4 °C)-98 5 °F (36 9 °C)] 97 6 °F (36 4 °C)  HR:  [69-82] 72  Resp:  [16-18] 16  BP: ()/(50-71) 118/71  SpO2:  [90 %-96 %] 95 %  Body mass index is 52 42 kg/m²  Input and Output Summary (last 24 hours): Intake/Output Summary (Last 24 hours) at 3/10/2019 2027  Last data filed at 3/10/2019 1200  Gross per 24 hour   Intake 120 ml   Output    Net 120 ml       Physical Exam:     GENERAL:  Obese - weak/fatigued  HEAD:  Normocephalic - atraumatic  EYES: PERRL - EOMI   MOUTH:  Mucosa moist  NECK:  Supple - full range of motion  CARDIAC:  Regular rate/rhythm - S1/S2 positive  PULMONARY:  Diminished to auscultation bilaterally  ABDOMEN:  Soft - nontender/nondistended - active bowel sounds - anasarca present  MUSCULOSKELETAL:  Motor strength/range of motion quite deconditioned - LE and UE pitting edema present    NEUROLOGIC:  Awake/alert   SKIN:  Left forearm erythema improving - chronic wrinkles/blemishes otherwise  PSYCHIATRIC:  Mood/affect stable      ADDITIONAL DATA       Labs & Recent Cultures:     Results from last 7 days   Lab Units 03/09/19  0937   WBC Thousand/uL 10 33*   HEMOGLOBIN g/dL 8 0*   HEMATOCRIT % 26 4*   PLATELETS Thousands/uL 135*   NEUTROS PCT % 66   LYMPHS PCT % 7*   MONOS PCT % 8   EOS PCT % 16*     Results from last 7 days   Lab Units 03/10/19  0705  03/04/19  0449   SODIUM mmol/L 133*   < > 136   POTASSIUM mmol/L 4 0   < > 3 4*   CHLORIDE mmol/L 98*   < > 101   CO2 mmol/L 28   < > 27   BUN mg/dL 22   < > 29*   CREATININE mg/dL 3 37*   < > 3 57*   CALCIUM mg/dL 7 5*   < > 6 9*   ALK PHOS U/L  --   --  78   ALT U/L  --   --  <6*   AST U/L  --   --  10    < > = values in this interval not displayed           Last 24 Hours Medication List:     Current Facility-Administered Medications:  acetaminophen 650 mg Oral Q6H PRN KATHY Cash    atorvastatin 40 mg Oral Daily With KATHY Baker    b complex-vitamin C-folic acid 1 capsule Oral Daily With You Mendez MD    bisacodyl 10 mg Rectal Daily PRN KATHY Cash    ceftaroline (TEFLARO)  mg Intravenous Q12H Quentin Hester MD Last Rate: 400 mg (03/10/19 1726)   diltiazem 180 mg Oral Daily Skyler Harvey MD    heparin (porcine) 3-30 Units/kg/hr (Order-Specific) Intravenous Titrated Anitha Alvarado PA-C Last Rate: 8 Units/kg/hr (03/10/19 1543)   heparin (porcine) 10,000 Units Intravenous PRN CECILE Vargas-ANA    heparin (porcine) 5,000 Units Intravenous PRN CECILE Vargas-ANA    hydrocortisone  Topical BID Kristal Bond, CRJOSE DANIEL    magnesium oxide 400 mg Oral Daily Carlos Alexander MD    melatonin 6 mg Oral HS Leidy Martinez PA-C    metoprolol 5 mg Intravenous Q6H PRN Jose Alfredo Ward MD    metoprolol tartrate 50 mg Oral TID Carlos Aleaxnder MD    midodrine 10 mg Oral Before Dialysis Carlos Alexander MD    nystatin  Topical BID KATHY Sainz    oxyCODONE 5 mg Oral Q4H PRN Jose Alfredo Ward MD    oxyCODONE 7 5 mg Oral Q4H PRN Jose Alfredo MD Ed    pantoprazole 40 mg Oral BID AC Suresh Eckert PA-C    polyvinyl alcohol 1 drop Both Eyes Q3H PRN KATHY Sainz           Time Spent for Care: 33 minutes  More than 50% of total time spent on counseling and coordination of care as described above  Current Length of Stay: 45 day(s)      Code Status: Level 1 - Full Code        ** Please Note: This note is constructed using a voice recognition dictation system  An occasional wrong word/phrase or sound-a-like substitution may have been picked up by dictation device due to the inherent limitations of voice recognition software  Read the chart carefully and recognize, using reasonable context, where substitutions may have occurred  **

## 2019-03-11 NOTE — SOCIAL WORK
CM discussed pt with Dr Ilya Joyce  And pt for tentative d/c end of week   CM updated [facility]   CM notified [facility] pt is on an acumax bed  Cm made them aware pt weight and height   Cm will follow

## 2019-03-11 NOTE — PROGRESS NOTES
[facility] Hospitalist Service - Internal Medicine Progress Note       PATIENT INFORMATION      Patient: Mervat Oh 60 y o  male   MRN: [id number]  PCP: Kate Newman DO  Unit/Bed#: [facility] 834-01 Encounter: [id number]  Date Of Visit: 03/11/19       ASSESSMENTS & PLAN       MSSA bacteremia - Resolved septic shock  Assessment & Plan  - CHON negative for endocarditis  - antibiotic regimen per Infectious Disease    Cellulitis/myositis of left forearm  Assessment & Plan  - appreciate Infectious Disease input - currently on IV Teflaro due to previous rash with associated eosinophilia through 3/16  - CT of forearm on 3/5 noted per radiologist to be concerning and read as follows: "Extensive subcutaneous edema throughout the forearm with associated radial muscle edema concerning for nonspecific cellulitis and myositis  Additionally, there is deep fascial edema noted along the radial soft tissues of the proximal forearm raising suspicion for necrotizing fasciitis without onelia soft tissue emphysema at this time  No drainable abscess collection    No underlying osseous destructive changes to indicate osteomyelitis "  - CPK normalized previously - appreciate general surgery who did not feel this is necrotizing fasciitis and recommended a repeat LUE venous doppler to assess progression of DVT which revealed no significant change from prior study with active presence of nonocclusive left IJ DVT along with nonocclusive superficial thrombophlebitis from the cephalic vein down to the antecubital fossa   - continue PRN pain control   - case management following regarding likelihood of need for skilled rehab placement    Acute renal failure  Assessment & Plan  - likely secondary to recent septic shock  - continue hemodialysis per nephrology who do not feel renal recovery will occur in the near future  - limit/avoid nephrotoxins as possible - home Demadex remains held     - continue Nephrocaps    Left internal jugular vein thrombus  Assessment & Plan  - remains on a heparin drip  - PRN pain control   - no significant changes on repeat doppler imaging study      Atrial flutter/fibrillation - Stress-induced cardiomyopathy   Assessment & Plan  - continue Cardizem/Lopressor regimen per cardiology - monitor heart rates specially while undergoing hemodialysis  - currently anticoagulated on heparin drip for a coexisting venous thrombus - cardiology however recommending no chronic anticoagulation and stated he may be a candidate for a Watchman device in the future if medically stable   - monitor/replete electrolytes as necessary - hypokalemia resolved  - EF improved from 30 -> 55% on repeat limited echocardiogram likely from recovering septic shock  - start discharge planning and await cardiology clearance    Acute blood loss anemia - Gastric ulcer  Assessment & Plan  - s/p injection/clipping  - c/w PPI regimen  - H/H stable    Morbid obesity   Assessment & Plan  - BMI of 52 42  - lifestyle/diet modifications    Acute metabolic encephalopathy  Assessment & Plan  - waxing/waning but generally improved - likely secondary to recovered septic shock in the setting of multiple medical issues  - clinical monitoring      Essential hypertension  Assessment & Plan  - continue Lopressor/Cardizem    History of aortic valve replacement with bioprosthetic valve  Assessment & Plan  - cardiology following  - no evidence of endocarditis on echocardiogram      VTE Prophylaxis:  Heparin drip       SUBJECTIVE     Seen/examined earlier today during nursing rounds  No significant overnight events  Weakness/fatigue persists  OBJECTIVE     Vitals:   Temp (24hrs), Av 9 °F (36 6 °C), Min:97 5 °F (36 4 °C), Max:98 3 °F (36 8 °C)    Temp:  [97 5 °F (36 4 °C)-98 3 °F (36 8 °C)] 98 °F (36 7 °C)  HR:  [70-88] 71  Resp:  [16-20] 20  BP: ()/(52-71) 102/71  SpO2:  [92 %-95 %] 94 %  Body mass index is 52 12 kg/m²       Input and Output Summary (last 24 hours): Intake/Output Summary (Last 24 hours) at 3/11/2019 1839  Last data filed at 3/11/2019 1200  Gross per 24 hour   Intake 279 65 ml   Output    Net 279 65 ml       Physical Exam:     GENERAL:  Obese - weak/fatigued  HEAD:  Normocephalic - atraumatic  EYES: PERRL - EOMI   MOUTH:  Mucosa moist  NECK:  Supple - full range of motion  CARDIAC:  Regular rate/rhythm currently - S1/S2 positive  PULMONARY:  Diminished bibasilar breath sounds  ABDOMEN:  Soft - nontender/nondistended - active bowel sounds - anasarca present  MUSCULOSKELETAL:  Motor strength/range of motion quite deconditioned - LE and UE pitting edema present    NEUROLOGIC:  Awake/alert   SKIN:  Left forearm erythema improving - chronic wrinkles/blemishes otherwise  PSYCHIATRIC:  Mood/affect stable      ADDITIONAL DATA       Labs & Recent Cultures:     Results from last 7 days   Lab Units 03/11/19  0635   WBC Thousand/uL 8 41   HEMOGLOBIN g/dL 9 1*   HEMATOCRIT % 29 9*   PLATELETS Thousands/uL 158   NEUTROS PCT % 61   LYMPHS PCT % 8*   MONOS PCT % 7   EOS PCT % 21*     Results from last 7 days   Lab Units 03/11/19  0635   SODIUM mmol/L 133*   POTASSIUM mmol/L 3 9   CHLORIDE mmol/L 96*   CO2 mmol/L 31   BUN mg/dL 34*   CREATININE mg/dL 4 24*   CALCIUM mg/dL 7 7*         Last 24 Hours Medication List:     Current Facility-Administered Medications:  acetaminophen 650 mg Oral Q6H PRN KATHY Walker    atorvastatin 40 mg Oral Daily With KATHY Baker    b complex-vitamin C-folic acid 1 capsule Oral Daily With Mathieu Bryant MD    bisacodyl 10 mg Rectal Daily PRN KATHY Walker    ceftaroline (TEFLARO)  mg Intravenous Q12H Jomar Almaguer MD Last Rate: 400 mg (03/11/19 0820)   diltiazem 180 mg Oral Daily Leticia Grey MD    heparin (porcine) 3-30 Units/kg/hr (Order-Specific) Intravenous Titrated Elena Mcfarlane MD Last Rate: 5 Units/kg/hr (03/11/19 1645)   heparin (porcine) 10,000 Units Intravenous PRN Neptali Ryan PA-C heparin (porcine) 5,000 Units Intravenous PRN Sherolyn Duverney, PA-C    hydrocortisone  Topical BID BabarKATHY Ely    magnesium oxide 400 mg Oral Daily Dayanara Ortega MD    melatonin 6 mg Oral HS Leidy Martinez PA-C    metoprolol 5 mg Intravenous Q6H PRN Dony Rider MD    metoprolol tartrate 50 mg Oral TID Dayanara Ortega MD    midodrine 10 mg Oral Before Dialysis Dayanara Ortega MD    nystatin  Topical BID KATHY Maravilla    oxyCODONE 5 mg Oral Q4H PRN Dony Rider MD    oxyCODONE 7 5 mg Oral Q4H PRN Dony Rider MD    pantoprazole 40 mg Oral BID AC Suresh Eckert PA-C    polyvinyl alcohol 1 drop Both Eyes Q3H PRN KATHY Maravilla           Time Spent for Care: 33 minutes  More than 50% of total time spent on counseling and coordination of care as described above  Current Length of Stay: 46 day(s)      Code Status: Level 1 - Full Code        ** Please Note: This note is constructed using a voice recognition dictation system  An occasional wrong word/phrase or sound-a-like substitution may have been picked up by dictation device due to the inherent limitations of voice recognition software  Read the chart carefully and recognize, using reasonable context, where substitutions may have occurred  **

## 2019-03-11 NOTE — PROGRESS NOTES
Cardiology Progress Note - Pauline Dos Santos 60 y o  male MRN: [id number]    Unit/Bed#: [facility] 834-01 Encounter: [id number]      Assessment:  Principal Problem:    MSSA bacteremia - Resolved septic shock  Active Problems:    Essential hypertension    Stress-induced cardiomyopathy    Acute respiratory failure with hypoxia (HCC)    History of aortic valve replacement with bioprosthetic valve    Persistent atrial fibrillation (HCC)    Thrombocytopenia (HCC)    Acute blood loss anemia - Gastric ulcer    Cerebrovascular accident ([address] )    Acute metabolic encephalopathy    Skin rash    Acute renal failure    Hypovolemic shock (HCC)    GI bleed    Left arm swelling    Deep tissue injury    Dysphagia    Cellulitis/myositis of left forearm    Left internal jugular vein thrombus    Atrial flutter/fibrillation - Stress-induced cardiomyopathy     Morbid obesity       Plan:  Patient is comfortable this morning  He has no chest pain or significant dyspnea  There were no issues overnight  On telemetry he is in atrial flutter with a controlled ventricular response  The dose of his Cardizem CD has been titrated  Potassium today is 3 9  Hemoglobin is 9 1  The nephrology note is appreciated  I have made no change today in his medical regimen  Subjective:   Patient seen and examined  No significant events overnight   negative  Objective:     Vitals: Blood pressure 95/55, pulse 70, temperature 97 8 °F (36 6 °C), temperature source Oral, resp  rate 18, height 5' 9" (1 753 m), weight (!) 160 kg (352 lb 15 3 oz), SpO2 95 %  , Body mass index is 52 12 kg/m² ,   Orthostatic Blood Pressures      Most Recent Value   Blood Pressure  95/55 filed at 03/11/2019 0700   Patient Position - Orthostatic VS  Lying filed at 03/11/2019 0700      ,      Intake/Output Summary (Last 24 hours) at 3/11/2019 0749  Last data filed at 3/11/2019 0034  Gross per 24 hour   Intake 279 65 ml   Output    Net 279 65 ml       No significant arrhythmias seen on telemetry review  Atrial flutter with a controlled ventricular response      Physical Exam:    GEN: Naseem Lombardi   NECK: supple, no carotid bruits, no JVD or HJR  HEART: normal rate, regular rhythm, normal S1 and S2, no murmurs, clicks, gallops or rubs   LUNGS: clear to auscultation bilaterally; no wheezes, rales, or rhonchi   ABDOMEN: normal bowel sounds, soft, no tenderness, no distention  EXTREMITIES: peripheral pulses normal; no clubbing, cyanosis, or edema  SKIN: warm and well perfused, no suspicious lesions on exposed skin    Labs & Results:    Admission on 01/24/2019   No results displayed because visit has over 200 results  Xr Chest Portable    Result Date: 2/22/2019  Narrative: CHEST INDICATION:   ETT placement  COMPARISON:  2/21/2019 EXAM PERFORMED/VIEWS:  XR CHEST PORTABLE FINDINGS:  There has been interval placement of an endotracheal tube which projects approximately 3 cm above the raad  Lines and tubes are otherwise unchanged from prior exam  Heart shadow is enlarged but unchanged from prior exam  There is pulmonary vascular congestion which appears unchanged from the prior study  Osseous structures appear within normal limits for patient age  Impression: Endotracheal tube in appropriate position  Cardiomegaly with stable mild pulmonary vascular congestion  Workstation performed: [id number]     Xr Chest Portable    Result Date: 2/22/2019  Narrative: CHEST INDICATION:   line placement  COMPARISON:  2/8/2019 EXAM PERFORMED/VIEWS:  XR CHEST PORTABLE FINDINGS:  Left IJ line noted with tip projecting over the left apex likely within the left brachiocephalic vein  Right dual-lumen catheter tip projects over the cavoatrial junction in stable position  Mild cardiomegaly appears stable  Mild central congestion noted and trace left basilar pleural effusion as before  Osseous structures appear within normal limits for patient age       Impression: Tubes and lines as above without pneumothorax  Mild central congestion and left trace basilar effusion  Workstation performed: [id number]     Xr Knee 1 Or 2 Vw Left    Result Date: 3/8/2019  Narrative: LEFT KNEE INDICATION:   knee pain s/p fall  COMPARISON:  1/7/2014 VIEWS:  XR KNEE 1 OR 2 VW LEFT FINDINGS: There is no acute fracture or dislocation  There is a moderate joint effusion  There has been progressive moderate narrowing of the medial compartment  There are small patellofemoral osteophytes  No lytic or blastic lesions are seen  Soft tissues are unremarkable  Impression: No acute osseous abnormality  Progressive moderate narrowing of the medial compartment with moderate effusion  Workstation performed: [id number]     Ct Head Wo Contrast    Result Date: 2/10/2019  Narrative: CT BRAIN - WITHOUT CONTRAST INDICATION:   Confusion/delirium, altered LOC, unexplained  COMPARISON:  CT head 1/23/2019 TECHNIQUE:  CT examination of the brain was performed  In addition to axial images, coronal 2D reformatted images were created and submitted for interpretation  Radiation dose length product (DLP) for this visit:  967 mGy-cm   This examination, like all CT scans performed in the [facility], was performed utilizing techniques to minimize radiation dose exposure, including the use of iterative reconstruction and automated exposure control  IMAGE QUALITY:  Diagnostic  FINDINGS: PARENCHYMA: Interval development of a ill-defined hypodense area in the right posterior parietal occipital region (series 2 image 32)  Similar area of decreased attenuation is present in the left posterior parietal occipital region (series 2 image 28)  Stable hypodense area within the left cerebellum  No significant mass effect or midline shift  No CT signs of acute infarction  No acute parenchymal hemorrhage  Hypodense areas in the bilateral frontal lobes similar to prior exam likely representing artifact   VENTRICLES AND EXTRA-AXIAL SPACES:  Normal for the patient's age  VISUALIZED ORBITS AND PARANASAL SINUSES:  Mild paranasal sinus mucosal thickening  There is partial opacification of the bilateral mastoid air cells  CALVARIUM AND EXTRACRANIAL SOFT TISSUES:  Normal      Impression: Interval development of subtle areas of decreased attenuation along the gray matter white matter interface in the bilateral parieto-occipital regions  Differential considerations include infectious etiologies such as abscess or septic emboli, metastatic disease or ischemia  No intracranial hemorrhage or significant mass effect  Further characterization with brain MRI should be considered  The study was marked in [facility] for immediate notification  Workstation performed: [id number]     Cta Abdomen Pelvis W Wo Contrast    Result Date: 2/22/2019  Narrative: CT ABDOMEN AND PELVIS - WITHOUT AND WITH IV CONTRAST INDICATION:   GI bleeding  Melena  COMPARISON: None  TECHNIQUE:  CT examination of the abdomen and pelvis was performed both prior to and after the administration of intravenous contrast  Axial, sagittal, and coronal 2D reformatted images were created from the source data and submitted for interpretation  Radiation dose length product (DLP) for this visit:  8735 mGy-cm   This examination, like all CT scans performed in the [facility], was performed utilizing techniques to minimize radiation dose exposure, including the use of iterative reconstruction and automated exposure control  IV Contrast:  100 mL of iodixanol (VISIPAQUE) was administered intravenously without immediate adverse reaction  Enteric Contrast:  Enteric contrast was not administered  FINDINGS: ABDOMEN  BOWEL:  There are small foci of hyperdensity within the gastric antrum/pylorus area (series 3, image 50) not definitely present on noncontrast images concerning for active hemorrhage    In addition there is increased density within the rectum (series 3, image 189) which is more conspicuous on the postcontrast imaging where additional active hemorrhage is not excluded  There is severe distention of the entire colon with liquid stool and gas measuring up to 9 3 cm in diameter with loss of the haustra within the descending colon to rectum  There is areas of pneumatosis within the colon seen predominantly within the splenic flexure and sigmoid colon  LOWER CHEST:  Bibasilar atelectasis  Partially visualized central venous catheter within the right atrium  LIVER/BILIARY TREE:  Redemonstrated hypodensities within the left hepatic lobe measuring 3 3 cm and 1 6 cm respectively  GALLBLADDER:  No calcified gallstones  No pericholecystic inflammatory change  SPLEEN:  Unremarkable  PANCREAS:  Atrophic  ADRENAL GLANDS:  Unremarkable  KIDNEYS/URETERS:  Unremarkable  No hydronephrosis  PELVIS REPRODUCTIVE ORGANS:  Unremarkable for patient's age  URINARY BLADDER:  Unremarkable  APPENDIX: No findings to suggest appendicitis  ADDITIONAL ABDOMINAL AND PELVIC STRUCTURES ABDOMINOPELVIC CAVITY:  No ascites or free intraperitoneal air  No lymphadenopathy  Retroperitoneal stranding along the inferior iliopsoas muscle on the left  ABDOMINAL WALL/INGUINAL REGIONS:  Unremarkable  OSSEOUS STRUCTURES:  No acute fracture or destructive osseous lesion  Impression: 1  Small foci of hyperdensity within the gastric antrum/pylorus area concerning for primary site of active hemorrhage given history of melanoma  In addition, there is a small focus of increased density within the rectum where additional active hemorrhage is not excluded  2   Extensive distention of the entire colon with liquid stool and gas with loss of haustra within the descending colon and rectum  There are areas of pneumatosis seen within the splenic flexure and sigmoid colon  Correlate clinically for C  difficile colitis, findings may be seen in the setting of toxic megacolon   3   Retroperitoneal stranding along the inferior iliopsoas muscle on the left, correlate for recent instrumentation  Underlying vasculature remains patent  I personally discussed the location of bleeding with Dr Oscar Sosa on 2/22/2019 at 4:35 AM  I personally discussed the possibility for C  difficile colitis and pneumatosis with Pavan Ramachandran on 2/22/2019 at 4:43 AM  Workstation performed: [id number]     Ir Central Line Placement    Result Date: 3/1/2019  Narrative: Examination: Right neck double lumen Power PICC 3/1/2019 Contrast: None Fluoroscopy time: 0 2 minutes Images:  2 Conscious sedation time:  Not applicable Procedure: The patient was identified verbally and by wristband  Timeout was performed  Informed consent was obtained  All elements of maximal sterile barrier technique, cap and mask and sterile gown and sterile gloves and sterile full-body drape and hand hygiene and 2% chlorhexidine for cutaneous antisepsis  Sterile ultrasound technique with sterile gel and sterile probe covers was also utilized  Following obtaining informed consent, the patient was prepped and draped in the usual sterile fashion  Using ultrasound guidance, access was gained to the patient's right internal jugular vein using a micropuncture system  The micropuncture dilator was exchanged for a peel away sheath  A mandril wire was advanced to the level of the RA/SVC junction to measure the catheter length  The catheter was cut to size and placed through the peel away sheath  The lumens were flushed with ease  The catheter was secured in place  The patient tolerated the procedure well without apparent immediate complication  The patient left the IR department in unchanged condition  Dr Turner Bledsoe performed the procedure  Findings: Using ultrasound guidance, the right internal jugular vein was cannulated using Seldinger technique  The right internal jugular vein was evaluated as a potential access site  The right internal jugular vein was patent, and free of thrombus   Static images of the vessel was obtained  Visualization of real time needle entry into the vessel was obtained  A fluoroscopic spot image demonstrated a newly placed PICC with the central aspect at the RA/SVC junction  The catheter tubing is smooth in contour  Impression: Impression: Successful placement of a 16 cm right neck double lumen Power PICC  Workstation performed: [id number]     Ct Forearm Left Wo Contrast    Result Date: 3/3/2019  Narrative: CT left forearm with without INDICATION: swelling and pain  COMPARISON: None  TECHNIQUE: CT examination of the left forearm was performed  This examination, like all CT scans performed in the [facility], was performed utilizing techniques to minimize radiation dose exposure, including the use of iterative reconstruction and automated exposure control software  Sagittal and coronal two dimensional reconstructed images were also submitted for interpretation  IV Contrast: Without Rad dose  1027 mGy-cm FINDINGS: OSSEOUS STRUCTURES:  No fracture, dislocation or destructive osseous lesion  VISUALIZED MUSCULATURE:  Unremarkable  SOFT TISSUES:  There is soft tissue infiltration noted in the forearm in the volar, dorsal aspect is infiltration in the subcutaneous tissue  Edema and fluid along the superficial aspect of the deep peripheral fascia  There is no drainable fluid collection seen Mild edema in the volar musculature is also suggested suggesting associated myositis  OTHER PERTINENT FINDINGS:  None       Impression: Extensive soft tissue edema in the subcutaneous is fat with infiltration along the peripheral aspect of the deep  fascia and mild edema in the volar compartment musculatures suggest superficial and deep cellulitis with mild myositis Lack of contrast limits the evaluation for detecting abscess however there is no large fluid collection with air-fluid level noted on this study No lytic lesion or periosteal reaction seen The study was marked in EPIC for significant notification  Workstation performed: [id number]     Ct Forearm Left W Contrast    Result Date: 3/5/2019  Narrative: CT left forearm with IV contrast INDICATION: Forearm erythema, swelling, cellulitis suspected  COMPARISON: 3/3/2019 TECHNIQUE: CT examination of the left forearm was performed  This examination, like all CT scans performed in the [facility], was performed utilizing techniques to minimize radiation dose exposure, including the use of iterative reconstruction and automated exposure control software  Sagittal and coronal two dimensional reconstructed images were also submitted for interpretation  IV Contrast: 100 mL of iodixanol (VISIPAQUE) Rad dose  630 39 mGy-cm FINDINGS: OSSEOUS STRUCTURES:  No fracture, dislocation or destructive osseous lesion  VISUALIZED MUSCULATURE:  Unremarkable  SOFT TISSUES:  Extensive subcutaneous edema throughout the forearm and dorsum of the hand with associated radial aspect muscle edema concerning for cellulitis as well as nonspecific myositis  There is no gross evidence of drainable abscess collection at  this time  No underlying osseous destructive lesion  Fascial edema appears mostly superficial with some deep fascial edema noted along the radial soft tissues of the upper forearm raising suspicion for necrotizing fasciitis without onelia soft tissue emphysema at this time  OTHER PERTINENT FINDINGS:  None  Impression: Extensive subcutaneous edema throughout the forearm with associated radial muscle edema concerning for nonspecific cellulitis and myositis  Additionally, there is deep fascial edema noted along the radial soft tissues of the proximal forearm raising suspicion for necrotizing fasciitis without onelia soft tissue emphysema at this time  No drainable abscess collection  No underlying osseous destructive changes to indicate osteomyelitis    I personally discussed this study with Dr Parke Fothergill on 3/5/2019 at 9:31 AM  Workstation performed: [id number]     Vas Carotid Complete Study    Result Date: 2/14/2019  Narrative:  [facility] REPORT CLINICAL: Indications: PT admitted to hospital with septic shock and change in mental status  Physician wants to rule out carotid artery stenosis  Operative History: Left Aortic valve replacement Perma cath Risk Factors The patient has history of morbid obesity, HTN, AFIB, MI, cardiomyopathy, and HLD  The patient's current BMI is 53 9, Weight is 365 lb and height is 69 in  He is a non-smoker  Clinical: Brachial BP: Right:   IV site  Left: 129/67 mmHg  FINDINGS:  Right        Impression         PSV  EDV (cm/s)  Direction of Flow  Ratio  Dist  ICA                       117          27                      1 39  Mid  ICA                         74          23                      0 87  Prox  ICA    1 - 49%            116          22                      1 37  Dist CCA                         78          15                            Mid CCA                          85          14                      0 85  Prox CCA                         99          18                            Ext Carotid                      70           8                      0 82  Prox Vert                        78          27  Antegrade                 Subclavian   moderate stenosis  318          25                             Left         Impression  PSV  EDV (cm/s)  Direction of Flow  Ratio  Dist  ICA                 75          21                      0 63  Mid  ICA                 129          29                      1 09  Prox   ICA    50 - 69%    154          38                      1 30  Dist CCA                 127          27                            Mid CCA                  119          10                      1 46  Prox CCA                  81          16                            Ext Carotid              134          14                      1 13  Prox Vert                 39           9  Antegrade Subclavian               129          11                               CONCLUSION:  Impression  RIGHT: There is <50% stenosis noted in the tortuous internal carotid artery  Plaque is heterogenous and smooth  Vertebral artery flow is antegrade  There is a moderate stenosis in the proximal subclavian artery  LEFT: There is a 50-69% stenosis noted in the tortuous internal carotid artery  Plaque is heterogenous and smooth  Vertebral artery flow is antegrade  There is no significant subclavian artery disease  Technically difficult study secondary to morbid obesity and severe heavy respiration  Technical findings faxed to chart  Compared to previous study on 07/18/2014, there is an increase in the disease process  Recommend repeat testing in 6 month as per protocol unless otherwise indicated  Internal carotid artery stenosis determination by consensus criteria from: Abdias Chaidez et al  Carotid Artery Stenosis: Gray-Scale and Doppler US Diagnosis - Society of Radiologists in [facility] [address], Radiology 2003; [phone]  SIGNATURE: Electronically Signed by: Javy Louis MD on 2019-02-14 02:46:02 PM    Vas Upper Limb Venous Duplex Scan, Unilateral/limited    Result Date: 3/6/2019  Narrative:  [facility] REPORT CLINICAL: Indications: Follow up evaluation to recent left arm DVT and superficial thrombophlebitis  He is currently receiving Heparin injections  Physician wants to rule out propagation of thrombus  Operative History: 2014-07-30 Left Aortic valve replacement 2014-07-18 Cardiac Catheterization Right Perma cath Risk Factors The patient has history of morbid obesity, HTN, AFIB, HLD, CKD, acute renal failure, dialysis, cardiomyopathy, CVA, DVT, superficial thrombophlebitis, left arm cellulitis, ESRD, and CAD  He is a non-smoker    FINDINGS:  Segment       Right            Left                        Impression       Impression              Int  Jugular  Normal (Patent)  Non Occlusive Thrombus  Ceph Upper                     Thrombus                Ceph Mid Arm                   Thrombus                Ceph AC                        Thrombus                   CONCLUSION:  Impression RIGHT UPPER LIMB LIMITED: Evaluation shows no evidence of thrombus in the internal jugular vein, subclavian vein, and the brachiocephalic vein  LEFT UPPER LIMB:  ABNORMAL: Evidence of non-occlusive acute deep vein thrombosis noted in the internal jugular vein  Evidence of non-occlusive acute vs subacute superficial thrombophlebitis noted in the cephalic vein from antecubital fossa to the proximal upper arm  Doppler evaluation shows a normal response to augmentation maneuvers  In comparison to the study of 03/01/2019, there is no significant change  Technically difficult bedside study secondary to morbid obesity and inability to re-position patient  Technical findings faxed to chart  SIGNATURE: Electronically Signed by: Bernie Burkett on 2019-03-06 04:06:16 PM    Vas Upper Limb Venous Duplex Scan, Unilateral/limited    Result Date: 3/1/2019  Narrative:  [facility] REPORT CLINICAL: Indications: Patient presents with left upper extremity edema and pain  Operative History: 2014-07-30 Left Aortic valve replacement 2014-07-18 Cardiac Catheterization Right Perma cath Risk Factors The patient has history of Obesity, HTN, HLD, CKD and CAD  The patient current BMI is 52 71, Weight is 357 lb and height is 69 in    FINDINGS:  Segment          Right            Left                              Impression       Impression              Innominate Vein  Normal (Patent)  Normal (Patent)         Int  Jugular     Normal (Patent)  Non Occlusive Thrombus  Subclavian       Normal (Patent)  Normal (Patent)         Axillary                          Normal (Patent)            CONCLUSION:  Impression RIGHT UPPER LIMB LIMITED: Evaluation shows no evidence of thrombus in the internal jugular vein, subclavian vein, and the brachiocephalic vein  LEFT UPPER LIMB: Evidence of non-occlusive acute deep vein thrombosis noted in the internal jugular vein  Evidence of non-occlusive acute vs subacute superficial thrombophlebitis noted in the cephalic vein from antecubital fossa to the proximal upper arm  Doppler evaluation shows a normal response to augmentation maneuvers  Technical findings given to bedside RN  SIGNATURE: Electronically Signed by: Radha Martinez on 2019-03-01 12:34:05 PM    Us Abdomen Limited    Result Date: 2/19/2019  Narrative: ABDOMEN ULTRASOUND, LIMITED, FOUR QUADRANT SURVEY INDICATION:    Abdominal distention  COMPARISON:  CT scan 1/23/2019  TECHNIQUE: Targeted four-quadrant ultrasound examination of the abdominal cavity was performed with a curvilinear transducer with standard grey scale imaging techniques  FINDINGS: There is no free fluid  Impression: No ascites  Workstation performed: [id number]     Ir Permacath Placement    Result Date: 2/18/2019  Narrative: EXAMINATION: Tunneled hemodialysis catheter placement INDICATION: 79-year-old male admitted with multifactorial shock and PATRICK underwent tunneled hemodialysis catheter placement on  2/4/2019  Catheter was completely dislodged and patient returns for replacement of the hemodialysis catheter  CONTRAST: None FLUOROSCOPY TIME:   0 4min IMAGES:  3 ANESTHESIA: Moderate sedation and local lidocaine PROCEDURE:  The patient was identified verbally and by wristband  Timeout was performed  Informed consent was obtained  Following obtaining informed consent, the patient was prepped and draped in the usual sterile fashion  All elements of maximal sterile barrier technique, cap and mask and sterile gown and sterile gloves and sterile full-body drape and hand hygiene and 2% chlorhexidine for cutaneous antisepsis  Sterile ultrasound technique with sterile gel and sterile probe covers was also utilized   1% local lidocaine with epinephrine was infiltrated into the skin and subcutaneous tissues overlying the right base of neck  Under ultrasound guidance, a micropuncture needle was used to gain access into the right IJV  Images were saved  Access was secured using a 5-Chadian transitional sheath  A microwire was used to measure the length needed for catheter  Local anesthetic was infiltrated into the right chest wall subcutaneous tissues and skin  A 1 cm incision was made and a 14  5-Chadian 32 cm Medcomp Hemo-Flow double-lumen hemodialysis catheter was tunneled within the subcutaneous tissues and brought out at the right IJV puncture site  A J-wire was advanced through the transitional sheath into the IVC  The tract was dilated and a peel-away sheath was advanced over the wire  The dialysis catheter was advanced through the peel-away sheath, followed by removal of the sheath  Final positioning of the catheter was verified under fluoroscopy  Catheter was found to aspirate and flush easily  Both lumens were locked with heparin  Catheter was secured to skin using 2-0 Prolene suture and sterile dressing was applied  Histoacryl glue was applied over the right IJV puncture site  The patient tolerated the procedure well without complication  The patient left the IR department in stable condition  Findings: 1  Using ultrasound guidance, the right IJ vein was cannulated using seldinger technique  The right IJ vein was evaluated as a potential access site  The right IJ vein was patent, and free of thrombus  Static images of the vessel was obtained  Visualization of real time needle entry into the vessel was obtained  2   Successful placement of tunneled hemodialysis catheter through right IJV access using 14  5-Chadian 32 cm Medcomp Hemo-Flow catheter  Impression: Impression: Successful placement of tunneled hemodialysis catheter through right IJV access   Workstation performed: [id number]       EKG personally reviewed by Jeison Sims MD      Counseling / Coordination of Care  Total floor / unit time spent today 30 minutes  Greater than 50% of total time was spent with the patient and / or family counseling and / or coordination of care

## 2019-03-11 NOTE — PROGRESS NOTES
Progress Note - Infectious Disease   Pauline Dos Santos 60 y o  male MRN: [id number]  Unit/Bed#: [facility] 834-01 Encounter: [id number]      Impression/Plan:  1  MSSA bacteremia:  Possibility of endocarditis considered in the setting of bioprosthetic AVR  [place] no evidence of valvular vegetations   Initial portal of entry may have been related to multiple upper back wounds   CT chest/abdomen/pelvis with no other evidence of acute infection   Possible from pneumonia as sputum culture was positive for MSSA   Bacteremia eventually cleared   Podiatry evaluated patient's feet and no acute issues   Neurology evaluation noted with prior changes in mental status, imaging abnormalities felt to be ischemic and similar compared to prior studies   No plan for MRI   Patient improved significantly after recent GI bleed       -continue ceftaroline as below  -patient completed antibiotic course for this issue on 03/10  -monitor temperature/WBC  -send blood cultures if patient spikes fever  -plan to repeat surveillance blood cultures on 03/22     2  Rash and peripheral eosinophilia:  Patient recently developed rash  It was noted to be flat and pruritic  Absolute eosinophil count elevated   Likely due to amiodarone as the patient tolerated rechallenge with Ancef and there was no difference in the rash when patient was switched to vancomycin empirically   I suspect that this will be a prolonged elevation given the half-life of amiodarone   Patient again noted with rash and itching after transfer out of ICU   Antibiotic therapy changed only to vancomycin and the patient's absolute eosinophilia increased   Unfortunately with multiple drugs causing his elevation in eosinophilia make it difficult to determine the actual cause  His rash seems to have slightly improved and eosinophilia is down trending       -monitor WBC  -continue to trend fever curve/vitals  -continue ceftaroline and monitor response     3   Left forearm myositis/cellulitis:  Patient was noted to have large ecchymosis his left forearm after transition from the ICU   This was thought to be due to issues with obtaining access during his acute decline   The ecchymosis has largely resolved however the patient's arm remains swollen more so than his other extremities and painful   CT imaging of the arm was done and findings are notable for soft tissue edema and suggestion of myositis/cellulitis   No obvious collections seen though study limited without contrast   Vascular study with superficial thrombophlebitis in the arm   The patient fortunately remains hemodynamically stable with persistent leukocytosis   Repeat CT scan reviewed and surgical evaluation appreciated   Duplex study without acute change   The arm itself appears softer and less painful on subsequent exam      -continue ceftaroline  -continue to monitor CBC  -continue to trend fever curve/vitals  -serial exams  -ongoing supportive care as per primary   -treat for total of 14 days for possible myositis through 3/16  -and potentially put patient on oral regimen if ready for rehab      4  History of bioprosthetic AVR   Secondary to degenerative AS   Placed in July 2014  Jacki Veras no evidence of endocarditis   Ongoing follow-up by Cardiology      5  Acute kidney injury   Likely ischemic/septic ATN    Patient remains on hemodialysis   He is status post PermCath placement      -ongoing follow-up by Nephrology  -antibiotics re-dose for intermittent HD      6  Leukocytosis:  WBC normalized  Yomi Martinez continues to improve clinically   This may be due now to problem 3 and problem 2 may be contributing      -continue to monitor fever curve/vitals  -continue monitor CBC  -continue antibiotics as above     Above plan discussed in detail with patient  Primary service updated of the above      ID consult service will continue to follow        Antibiotics:  Ceftaroline    24 hour events:  No acute events noted overnight on chart review  Patient currently afebrile  White blood cell count 8 4  Patient's other vitals are stable  Eosinophilia count remains elevated    Subjective:  Patient seen at bedside this morning currently denies having any nausea, vomiting, chest pain or shortness of breath  He continues to have itching all over his skin  His rash is largely dissipated and the pain in his arm is improving  Objective:  Vitals:  Temp:  [97 6 °F (36 4 °C)-98 3 °F (36 8 °C)] 97 8 °F (36 6 °C)  HR:  [70-82] 70  Resp:  [16-18] 18  BP: ()/(55-71) 95/55  SpO2:  [93 %-96 %] 95 %  Temp (24hrs), Av °F (36 7 °C), Min:97 6 °F (36 4 °C), Max:98 3 °F (36 8 °C)  Current: Temperature: 97 8 °F (36 6 °C)    Physical Exam:   General Appearance:  Alert, interactive, nontoxic, no acute distress  Throat: Oropharynx moist without lesions  Lungs:   Clear to auscultation anteriorly bilaterally; no wheezes, rhonchi or rales; respirations unlabored on room air   Heart:  RRR; no murmur, rub or gallop   Abdomen:   Soft, non-tender, non-distended, positive bowel sounds  Extremities: No clubbing, cyanosis; ongoing edema in all of his extremities  Skin: No new rashes or lesions  No new draining wounds noted  Patient's previously noted rash has largely dissipated  Scratch marks are noted along the patient's abdomen and chest and arms    His left forearm again appears without any significant erythema, warmth, induration and is much less tense on exam        Labs, Imaging, & Other studies:   All pertinent labs and imaging studies were personally reviewed  Results from last 7 days   Lab Units 19  0635 19  0937 19  0922 19  0430   WBC Thousand/uL 8 41 10 33*  --  12 91*   HEMOGLOBIN g/dL 9 1* 8 0* 9 6* 9 3*   PLATELETS Thousands/uL 158 135*  --  173     Results from last 7 days   Lab Units 19  0635   POTASSIUM mmol/L 3 9   CHLORIDE mmol/L 96*   CO2 mmol/L 31   BUN mg/dL 34*   CREATININE mg/dL 4 24*   EGFR ml/min/1 73sq m 14   CALCIUM mg/dL 7 7*

## 2019-03-11 NOTE — UTILIZATION REVIEW
Continued Stay Review - INPATIENT  Date: 03/11/2019  Vital Signs: BP 95/52 (BP Location: Right arm)   Pulse 88   Temp 97 5 °F (36 4 °C) (Oral)   Resp 18   Ht 5' 9" (1 753 m)   Wt (!) 160 kg (352 lb 15 3 oz)   SpO2 92%   BMI 52 12 kg/m²   Assessment/Plan: see progress notes below  Medications:   Scheduled Meds:   Current Facility-Administered Medications:  acetaminophen 650 mg Oral Q6H PRN    atorvastatin 40 mg Oral Daily With Dinner    b complex-vitamin C-folic acid 1 capsule Oral Daily With Dinner    bisacodyl 10 mg Rectal Daily PRN    ceftaroline (TEFLARO)  mg Intravenous Q12H Last Rate: 400 mg (03/11/19 0820)   diltiazem 180 mg Oral Daily    heparin (porcine) 3-30 Units/kg/hr (Order-Specific) Intravenous Titrated Last Rate: 8 Units/kg/hr (03/11/19 0734)   heparin (porcine) 10,000 Units Intravenous PRN    heparin (porcine) 5,000 Units Intravenous PRN    hydrocortisone  Topical BID    magnesium oxide 400 mg Oral Daily    melatonin 6 mg Oral HS    metoprolol 5 mg Intravenous Q6H PRN    metoprolol tartrate 50 mg Oral TID    midodrine 10 mg Oral Before Dialysis    nystatin  Topical BID    oxyCODONE 5 mg Oral Q4H PRN    oxyCODONE 7 5 mg Oral Q4H PRN    pantoprazole 40 mg Oral BID AC    polyvinyl alcohol 1 drop Both Eyes Q3H PRN      Continuous Infusions:   heparin (porcine) 3-30 Units/kg/hr (Order-Specific) Last Rate: 8 Units/kg/hr (03/11/19 0734)   Pertinent Labs/Diagnostic Results:   WBC 8 41  Hgl 9 1 / Hct 29 9  Platelets 885  BUN 34 / Cr 4 24  Age/Sex: 60 y o  male   Discharge Plan: TBD    ------------------------------------------------------------------------------------------------------------------------  Progress Note Cardio  On telemetry he is in atrial flutter with a controlled ventricular response  The dose of his Cardizem CD has been titrated  Potassium today is 3 9  Hemoglobin is 9 1  -----------------------------------------------------------------------------------------------------------------------  Progress Notes ID  MSSA bacteremia / Rash and peripheral eosinophilia  -continue ceftaroline as below  -monitor Temp/WBC  Left forearm myositis/cellulitis : remains swollen more so than his other extremities and painful  Vascular study with superficial thrombophlebitis in the arm   -continue ceftaroline  -continue to monitor CBC  -continue to trend fever curve/vitals  -serial exams  -ongoing supportive care as per primary   -treat for total of 14 days for possible myositis through 3/16   Acute kidney injury   Patient remains on hemodialysis  Janak Barton is status post PermCath placement     ----------------------------------------------------------------------------------------------------------------------------------  Progress Note Nephrology  1 ) Acute Kidney Injury/acute renal failure  - Present on admission (POA) ; admission creatinine:  2 63  - Urinalysis:  From January 2019 was negative for glucose and ketones, large blood, positive nitrate, negative leukocytes, pH of 5 5, 3+ protein, innumerable red blood cells as well as innumerable white blood cells  - Imaging:  CT scan from February 22nd showed no evidence of hydronephrosis  - Serologies: n/a  - Access:  Right IJ PermCath  - Etiology:  Most likely in the setting ischemic/septic acute tubular necrosis leading to renal failure that is currently dialysis dependent  Cannot completely rule out acute interstitial nephritis given the peripheral eosinophilia/rash  He is currently dialysis dependent since 2/1/2019  His creatinine continues to trend off dialysis  There is no urine output documented  Will continue to monitor postvoid residuals  - Status:  Remains dialysis dependent  Has been on dialysis since 2/1/2019    Creatinine continues to trend up off dialysis  - Plan:  Plan for next dialysis tomorrow  - monitor postvoid residual - avoid hypotension     2 ) Blood pressure/volume status  -most recent echocardiogram shows ejection fraction of 55%, history of systolic congestive heart failure  -he does examined volume overloaded but not in any acute distress currently off oxygen saturating well and currently comfortable  -plan for next dialysis tomorrow with ultrafiltration as blood pressure tolerates  -------------------------------------------------------------------------------------------------------------------------------------  Network Utilization Review Department  Phone: [phone]; Fax [phone]  [email or web address]  ATTENTION: Please call with any questions or concerns to [phone]  and carefully listen to the prompts so that you are directed to the right person  Send all requests for admission clinical reviews, approved or denied determinations and any other requests to fax [phone]   All voicemails are confidential

## 2019-03-11 NOTE — PROGRESS NOTES
NEPHROLOGY PROGRESS NOTE   Wilberto Suggs 60 y o  male MRN: [id number]  Unit/Bed#: [facility] 834-01 Encounter: [id number]  Reason for Consult: Acute Renal Failure    ASSESSMENT and PLAN:    Patient is a 79-year-old male with a history of hypertension/morbid obesity/bioprosthetic aortic valve replacement who presented with shock and AK I from [address]:  His course was complicated by MSSA bacteremia/PATRICK/GI bleeding/atrial fibrillation:  We are asked to follow for PATRICK on CKD now hemodialysis dependence after initial CRRT    1 ) Acute Kidney Injury/acute renal failure  - Present on admission (POA) ; admission creatinine:  2 63  - Urinalysis:  From January 2019 was negative for glucose and ketones, large blood, positive nitrate, negative leukocytes, pH of 5 5, 3+ protein, innumerable red blood cells as well as innumerable white blood cells  - Imaging:  CT scan from February 22nd showed no evidence of hydronephrosis  - Serologies: n/a  - Access:  Right IJ PermCath  - Etiology:  Most likely in the setting ischemic/septic acute tubular necrosis leading to renal failure that is currently dialysis dependent  Cannot completely rule out acute interstitial nephritis given the peripheral eosinophilia/rash  He is currently dialysis dependent since 2/1/2019  His creatinine continues to trend off dialysis  There is no urine output documented  Will continue to monitor postvoid residuals  - Status:  Remains dialysis dependent  Has been on dialysis since 2/1/2019    Creatinine continues to trend up off dialysis  - Plan:  Plan for next dialysis tomorrow  - monitor postvoid residual - avoid hypotension    2 ) Blood pressure/volume status  -most recent echocardiogram shows ejection fraction of 55%, history of systolic congestive heart failure  -he does examined volume overloaded but not in any acute distress currently off oxygen saturating well and currently comfortable  -plan for next dialysis tomorrow with ultrafiltration as blood pressure tolerates    3 ) MSSA bacteremia  -CHON unremarkable  -antibiotics as per Infectious Disease    4 ) Hyponatremia  -mild, likely volume mediated  -will monitor with ultrafiltration on dialysis    5 ) Anemia  -hemoglobin stable      SUBJECTIVE / INTERVAL HISTORY:    No overnight events  Resting comfortably    OBJECTIVE:  Current Weight: Weight - Scale: (!) 160 kg (352 lb 15 3 oz)  Vitals:    03/11/19 0300 03/11/19 0600 03/11/19 0700 03/11/19 1100   BP: 108/59  95/55 95/52   BP Location: Right arm  Right arm Right arm   Pulse: 70  70 88   Resp: 16  18 18   Temp: 98 3 °F (36 8 °C)  97 8 °F (36 6 °C) 97 5 °F (36 4 °C)   TempSrc: Oral  Oral Oral   SpO2: 95%  95% 92%   Weight:  (!) 160 kg (352 lb 15 3 oz)     Height:           Intake/Output Summary (Last 24 hours) at 3/11/2019 1401  Last data filed at 3/11/2019 0800  Gross per 24 hour   Intake 279 65 ml   Output    Net 279 65 ml       Review of Systems:    12 point ROS has been reviewed  Physical Exam   Constitutional: He is oriented to person, place, and time  He appears well-developed and well-nourished  No distress  HENT:   Head: Normocephalic and atraumatic  Eyes: Pupils are equal, round, and reactive to light  Conjunctivae are normal  No scleral icterus  Neck: Normal range of motion  Neck supple  Cardiovascular: Normal rate, regular rhythm, S1 normal, S2 normal and intact distal pulses  Exam reveals no gallop and no friction rub  No murmur heard  Pulmonary/Chest: Effort normal and breath sounds normal  No respiratory distress  He has no wheezes  He has no rales  Abdominal: Soft  Bowel sounds are normal  There is no tenderness  There is no rebound  Musculoskeletal: Normal range of motion  He exhibits edema  Neurological: He is alert and oriented to person, place, and time  Skin: No rash noted  Psychiatric: He has a normal mood and affect  His behavior is normal    Nursing note and vitals reviewed        Medications:    Current Facility-Administered Medications:     acetaminophen (TYLENOL) tablet 650 mg, 650 mg, Oral, Q6H PRN, Kristal Bond, KATHY, 650 mg at 03/11/19 1154    atorvastatin (LIPITOR) tablet 40 mg, 40 mg, Oral, Daily With Dinner, [employer], CRNP, 40 mg at 03/10/19 1718    b complex-vitamin C-folic acid (NEPHROCAPS) capsule 1 capsule, 1 capsule, Oral, Daily With Deshawn Silverman MD, 1 capsule at 03/10/19 1718    bisacodyl (DULCOLAX) rectal suppository 10 mg, 10 mg, Rectal, Daily PRN, [email or web address], CRNP    ceftaroline (TEFLARO) 400 mg in sodium chloride 0 9 % 50 mL IVPB, 400 mg, Intravenous, Q12H, Lisa Lutz MD, Last Rate: 50 mL/hr at 03/11/19 0820, 400 mg at 03/11/19 0820    diltiazem (CARDIZEM CD) 24 hr capsule 180 mg, 180 mg, Oral, Daily, Carlos Haile MD, 180 mg at 03/10/19 1718    heparin (porcine) 25,000 units in 250 mL infusion (premix), 3-30 Units/kg/hr (Order-Specific), Intravenous, Titrated, CECILE Mota-C, Last Rate: 12 9 mL/hr at 03/11/19 0734, 8 Units/kg/hr at 03/11/19 0734    heparin (porcine) injection 10,000 Units, 10,000 Units, Intravenous, PRN, Garfield Shah, PA-C, 10,000 Units at 03/07/19 1544    heparin (porcine) injection 5,000 Units, 5,000 Units, Intravenous, PRN, Garfield Shah, PA-C, 5,000 Units at 03/11/19 0034    hydrocortisone 1 % cream, , Topical, BID, KATHY Lugo    magnesium oxide (MAG-OX) tablet 400 mg, 400 mg, Oral, Daily, Eduardo Beaver MD, 400 mg at 03/11/19 0820    melatonin tablet 6 mg, 6 mg, Oral, HS, JIM MartinezC, 6 mg at 03/10/19 2116    metoprolol (LOPRESSOR) injection 5 mg, 5 mg, Intravenous, Q6H PRN, Sunday Coley MD, 5 mg at 03/02/19 1150    metoprolol tartrate (LOPRESSOR) tablet 50 mg, 50 mg, Oral, TID, Eduardo Beaver MD, 50 mg at 03/10/19 2116    midodrine (PROAMATINE) tablet 10 mg, 10 mg, Oral, Before Dialysis, Eduardo Beaver MD, 10 mg at 03/09/19 1023    nystatin (MYCOSTATIN) powder, , Topical, BID, [employer],   Wilder Guerrero oxyCODONE (ROXICODONE) IR tablet 5 mg, 5 mg, Oral, Q4H PRN, Jeannette Ellison MD, 5 mg at 03/10/19 1718    oxyCODONE (ROXICODONE) IR tablet 7 5 mg, 7 5 mg, Oral, Q4H PRN, Jeannette Ellison MD, 7 5 mg at 03/10/19 2116    pantoprazole (PROTONIX) EC tablet 40 mg, 40 mg, Oral, BID AC, Suresh Eckert PA-C, 40 mg at 03/11/19 8167    polyvinyl alcohol (LIQUIFILM TEARS) 1 4 % ophthalmic solution 1 drop, 1 drop, Both Eyes, Q3H PRN, KATHY De León, 1 drop at 03/05/19 0603    Laboratory Results:  Results from last 7 days   Lab Units 03/11/19  0635 03/10/19  0705 03/09/19  7327 03/09/19  0935 03/08/19  0922 03/08/19  0430 03/07/19  2138 03/07/19  0505 03/07/19  0500 03/06/19  2129  03/06/19  0553 03/06/19  0552  03/05/19  0638   WBC Thousand/uL 8 41  --  10 33*  --   --  12 91*  --   --   --   --   --  12 11*  --   --  10 87*   HEMOGLOBIN g/dL 9 1*  --  8 0*  --  9 6* 9 3* 9 1* 8 3*  --  8 6*   < > 8 6*  --    < > 9 1*   HEMATOCRIT % 29 9*  --  26 4*  --  31 1* 30 4* 29 6* 26 9*  --  28 3*   < > 27 0*  --    < > 29 5*   PLATELETS Thousands/uL 158  --  135*  --   --  173  --   --   --   --   --  175  --   --  138*   POTASSIUM mmol/L 3 9 4 0  --  3 9  --  3 5  --   --  3 7  --   --   --  3 5  --  4 0   CHLORIDE mmol/L 96* 98*  --  98*  --  98*  --   --  101  --   --   --  101  --  101   CO2 mmol/L 31 28  --  28  --  31  --   --  26  --   --   --  28  --  24   BUN mg/dL 34* 22  --  35*  --  24  --   --  31*  --   --   --  23  --  35*   CREATININE mg/dL 4 24* 3 37*  --  4 40*  --  3 34*  --   --  3 91*  --   --   --  3 19*  --  4 39*   CALCIUM mg/dL 7 7* 7 5*  --  7 4*  --  7 1*  --   --  7 2*  --   --   --  7 0*  --  7 1*   MAGNESIUM mg/dL 1 9 1 9  --  1 8  --  1 8  --   --  1 8  --   --   --  1 8  --  1 9   PHOSPHORUS mg/dL  --   --   --   --   --   --   --   --   --   --   --   --   --   --  4 3    < > = values in this interval not displayed

## 2019-03-11 NOTE — NURSING NOTE
Discussed heparin drip with Dr Bailee Bloom  Per order, drip to be on hold for 1 hour with PTT of 176  Decrease in 1 hour based on previous rate of 8 units/kg/hr to 5units/kg/hr  PTT to be rechecked per protocol

## 2019-03-12 ENCOUNTER — APPOINTMENT (INPATIENT)
Dept: RADIOLOGY | Facility: HOSPITAL | Age: 59
DRG: 871 | End: 2019-03-12
Payer: COMMERCIAL

## 2019-03-12 ENCOUNTER — APPOINTMENT (INPATIENT)
Dept: DIALYSIS | Facility: HOSPITAL | Age: 59
DRG: 871 | End: 2019-03-12
Attending: INTERNAL MEDICINE
Payer: COMMERCIAL

## 2019-03-12 LAB
ANION GAP SERPL CALCULATED.3IONS-SCNC: 9 MMOL/L (ref 4–13)
APTT PPP: 66 SECONDS (ref 26–38)
BASOPHILS # BLD AUTO: 0.07 THOUSANDS/ΜL (ref 0–0.1)
BASOPHILS NFR BLD AUTO: 1 % (ref 0–1)
BUN SERPL-MCNC: 43 MG/DL (ref 5–25)
CALCIUM SERPL-MCNC: 7.4 MG/DL (ref 8.3–10.1)
CHLORIDE SERPL-SCNC: 98 MMOL/L (ref 100–108)
CO2 SERPL-SCNC: 27 MMOL/L (ref 21–32)
CREAT SERPL-MCNC: 4.86 MG/DL (ref 0.6–1.3)
EOSINOPHIL # BLD AUTO: 1.39 THOUSAND/ΜL (ref 0–0.61)
EOSINOPHIL NFR BLD AUTO: 16 % (ref 0–6)
ERYTHROCYTE [DISTWIDTH] IN BLOOD BY AUTOMATED COUNT: 15.3 % (ref 11.6–15.1)
GFR SERPL CREATININE-BSD FRML MDRD: 12 ML/MIN/1.73SQ M
GLUCOSE SERPL-MCNC: 81 MG/DL (ref 65–140)
HCT VFR BLD AUTO: 28.1 % (ref 36.5–49.3)
HGB BLD-MCNC: 8.7 G/DL (ref 12–17)
IMM GRANULOCYTES # BLD AUTO: 0.13 THOUSAND/UL (ref 0–0.2)
IMM GRANULOCYTES NFR BLD AUTO: 1 % (ref 0–2)
LYMPHOCYTES # BLD AUTO: 0.62 THOUSANDS/ΜL (ref 0.6–4.47)
LYMPHOCYTES NFR BLD AUTO: 7 % (ref 14–44)
MAGNESIUM SERPL-MCNC: 2 MG/DL (ref 1.6–2.6)
MCH RBC QN AUTO: 30 PG (ref 26.8–34.3)
MCHC RBC AUTO-ENTMCNC: 31 G/DL (ref 31.4–37.4)
MCV RBC AUTO: 97 FL (ref 82–98)
MONOCYTES # BLD AUTO: 0.57 THOUSAND/ΜL (ref 0.17–1.22)
MONOCYTES NFR BLD AUTO: 6 % (ref 4–12)
NEUTROPHILS # BLD AUTO: 6.2 THOUSANDS/ΜL (ref 1.85–7.62)
NEUTS SEG NFR BLD AUTO: 69 % (ref 43–75)
NRBC BLD AUTO-RTO: 0 /100 WBCS
PLATELET # BLD AUTO: 147 THOUSANDS/UL (ref 149–390)
PMV BLD AUTO: 11.7 FL (ref 8.9–12.7)
POTASSIUM SERPL-SCNC: 4.5 MMOL/L (ref 3.5–5.3)
RBC # BLD AUTO: 2.9 MILLION/UL (ref 3.88–5.62)
SODIUM SERPL-SCNC: 134 MMOL/L (ref 136–145)
WBC # BLD AUTO: 8.98 THOUSAND/UL (ref 4.31–10.16)

## 2019-03-12 PROCEDURE — 99232 SBSQ HOSP IP/OBS MODERATE 35: CPT | Performed by: INTERNAL MEDICINE

## 2019-03-12 PROCEDURE — 97530 THERAPEUTIC ACTIVITIES: CPT

## 2019-03-12 PROCEDURE — 83735 ASSAY OF MAGNESIUM: CPT | Performed by: INTERNAL MEDICINE

## 2019-03-12 PROCEDURE — 97127 HB COGNITIVE SKILLS DEVELOPMENT, EACH 15 MUNUTES: CPT

## 2019-03-12 PROCEDURE — 97110 THERAPEUTIC EXERCISES: CPT

## 2019-03-12 PROCEDURE — 99233 SBSQ HOSP IP/OBS HIGH 50: CPT | Performed by: INTERNAL MEDICINE

## 2019-03-12 PROCEDURE — 85025 COMPLETE CBC W/AUTO DIFF WBC: CPT | Performed by: INTERNAL MEDICINE

## 2019-03-12 PROCEDURE — 85730 THROMBOPLASTIN TIME PARTIAL: CPT | Performed by: INTERNAL MEDICINE

## 2019-03-12 PROCEDURE — 80048 BASIC METABOLIC PNL TOTAL CA: CPT | Performed by: INTERNAL MEDICINE

## 2019-03-12 PROCEDURE — 97535 SELF CARE MNGMENT TRAINING: CPT

## 2019-03-12 PROCEDURE — 70450 CT HEAD/BRAIN W/O DYE: CPT

## 2019-03-12 PROCEDURE — G0515 COGNITIVE SKILLS DEVELOPMENT: HCPCS

## 2019-03-12 RX ORDER — DIAPER,BRIEF,INFANT-TODD,DISP
EACH MISCELLANEOUS 2 TIMES DAILY PRN
Status: DISCONTINUED | OUTPATIENT
Start: 2019-03-12 | End: 2019-03-16 | Stop reason: HOSPADM

## 2019-03-12 RX ORDER — HEPARIN SODIUM 5000 [USP'U]/ML
5000 INJECTION, SOLUTION INTRAVENOUS; SUBCUTANEOUS EVERY 8 HOURS SCHEDULED
Status: DISCONTINUED | OUTPATIENT
Start: 2019-03-12 | End: 2019-03-16 | Stop reason: HOSPADM

## 2019-03-12 RX ADMIN — METOPROLOL TARTRATE 50 MG: 50 TABLET, FILM COATED ORAL at 16:18

## 2019-03-12 RX ADMIN — METOPROLOL TARTRATE 50 MG: 50 TABLET, FILM COATED ORAL at 21:34

## 2019-03-12 RX ADMIN — Medication 400 MG: at 08:21

## 2019-03-12 RX ADMIN — OXYCODONE HYDROCHLORIDE 5 MG: 5 TABLET ORAL at 14:17

## 2019-03-12 RX ADMIN — ATORVASTATIN CALCIUM 40 MG: 40 TABLET, FILM COATED ORAL at 16:18

## 2019-03-12 RX ADMIN — CEFTAROLINE FOSAMIL 400 MG: 400 POWDER, FOR SOLUTION INTRAVENOUS at 22:01

## 2019-03-12 RX ADMIN — CEFTAROLINE FOSAMIL 400 MG: 400 POWDER, FOR SOLUTION INTRAVENOUS at 11:45

## 2019-03-12 RX ADMIN — HEPARIN SODIUM AND DEXTROSE 9 UNITS/KG/HR: 10000; 5 INJECTION INTRAVENOUS at 07:50

## 2019-03-12 RX ADMIN — OXYCODONE HYDROCHLORIDE 5 MG: 5 TABLET ORAL at 08:21

## 2019-03-12 RX ADMIN — HEPARIN SODIUM 5000 UNITS: 5000 INJECTION INTRAVENOUS; SUBCUTANEOUS at 21:32

## 2019-03-12 RX ADMIN — NYSTATIN 1 APPLICATION: 100000 POWDER TOPICAL at 08:24

## 2019-03-12 RX ADMIN — MELATONIN 6 MG: at 21:35

## 2019-03-12 RX ADMIN — HYDROCORTISONE 1 APPLICATION: 1 CREAM TOPICAL at 08:24

## 2019-03-12 RX ADMIN — PANTOPRAZOLE SODIUM 40 MG: 40 TABLET, DELAYED RELEASE ORAL at 16:18

## 2019-03-12 RX ADMIN — PANTOPRAZOLE SODIUM 40 MG: 40 TABLET, DELAYED RELEASE ORAL at 06:19

## 2019-03-12 RX ADMIN — Medication 1 CAPSULE: at 16:18

## 2019-03-12 RX ADMIN — OXYCODONE HYDROCHLORIDE 7.5 MG: 5 TABLET ORAL at 19:56

## 2019-03-12 RX ADMIN — DILTIAZEM HYDROCHLORIDE 180 MG: 180 CAPSULE, COATED, EXTENDED RELEASE ORAL at 17:10

## 2019-03-12 RX ADMIN — ACETAMINOPHEN 650 MG: 325 TABLET, FILM COATED ORAL at 21:38

## 2019-03-12 RX ADMIN — NYSTATIN: 100000 POWDER TOPICAL at 17:11

## 2019-03-12 NOTE — SOCIAL WORK
Cm spoke with Jai Llamas with [facility] [phone] and updated pt for tentative d/c Saturday   [place] aware pt bariatric and will need to be on stretcher  Cm will follow

## 2019-03-12 NOTE — PLAN OF CARE
Problem: OCCUPATIONAL THERAPY ADULT  Goal: Performs self-care activities at highest level of function for planned discharge setting  See evaluation for individualized goals  Description  Treatment Interventions: ADL retraining, Functional transfer training, UE strengthening/ROM, Endurance training, Cognitive reorientation, Patient/family training, Equipment evaluation/education, Fine motor coordination activities, Compensatory technique education, Continued evaluation, Energy conservation, Activityengagement          See flowsheet documentation for full assessment, interventions and recommendations  Outcome: Progressing  Note:   Limitation: Decreased ADL status, Decreased Safe judgement during ADL, Decreased cognition, Decreased endurance, Decreased fine motor control, Decreased self-care trans, Decreased high-level ADLs, Decreased UE ROM, Decreased UE strength  Prognosis: Fair  Assessment: Pt was seen this date for OT tx session focsuing on bed mobility, self cares, sitting toelrance/balance, tracking/scanning, cognitive oritentation and funcitonal cognition, and overall activity tolerance  Pt presents supine, with incontinent bowel  Max A x 2 for rollling, dependent for hygiene following, dependent for LB dressing don socks in supine  Pt requires max A x 2-3 for bed mobility supine to sit EOB, Pt sat EOB approx 15 mins with bilateral and unilateral support with min A to S level  While seate dEOB pt comepltes cognitive reorientation tasks, funcitonal cognition tasks, tracking/ scanning/ targeting noted diofficulity with same to L side  Toelrates sittin gEOB approx 15 mins, increased emotional support and encouragment provided throguhout tx session, pt making statements like " I'm no use any more" and "I dont care about life" Recommend neuropsych consult due to same as pt perseverated on these types of comments throguhout, may be pain driven however pt with significant LOS as well   C O of pain in BL legs limitning him  Pt requires Max A x 3 for sit to supine, pt positionined in chair position at end of session, NSg present throguhout tx session and aware of all mentioned above  Pt would benefit from continue dOT tx to improve overall funiotnal abiilities, continue to follow with current POC  OTR to see to assess goals next tx     Recommendation: (S) Other (comment)(MAY BENEFIT FROM NEUROPSYCH CONSULT)  OT Discharge Recommendation: Short Term Rehab  OT - OK to Discharge: Yes  Parveen DoctorMARILIA

## 2019-03-12 NOTE — SOCIAL WORK
CM called pt wife Silvia Olivaara Silver [phone] and updated   Silvia Sheppard aware cm spoke with Susana Pop from [facility]   Silvia Sheppard aware [facility] need to see progression in PT and that he can transfer to chair with assistance x2   Silvia Sheppard aware [facility] will not accept if he is  a elo lift to get out of bed  Cm awaiting PT and OT notes and if hometown not willing to accept Silvia Sheppard requested cm put referral to [facility]  Referral in in to have [facility] evaluate   Cm updated dialysis referral   Cm will follow

## 2019-03-12 NOTE — PROGRESS NOTES
NEPHROLOGY PROGRESS NOTE   Lilia Pace 60 y o  male MRN: [id number]  Unit/Bed#: [facility] 834-01 Encounter: [id number]  Reason for Consult: PATRICK    ASSESSMENT and PLAN:    obesity/bioprosthetic aortic valve replacement who presented with shock and AK I from [facility] THE:  His course was complicated by MSSA bacteremia/PATRICK/GI bleeding/atrial fibrillation:  We are asked to follow for PATRICK on CKD now hemodialysis dependence after initial CRRT     1 ) Acute Renal Failure/AcuteTubular Necrosis  - Present on admission (POA) ; admission creatinine:  2 63  - Urinalysis:  From January 2019 was negative for glucose and ketones, large blood, positive nitrate, negative leukocytes, pH of 5 5, 3+ protein, innumerable red blood cells as well as innumerable white blood cells  - Imaging:  CT scan from February 22nd showed no evidence of hydronephrosis  - Serologies: n/a  - Access:  Right IJ PermCath  - Etiology:  Most likely in the setting ischemic/septic acute tubular necrosis leading to renal failure that is currently dialysis dependent  Cannot completely rule out acute interstitial nephritis given the peripheral eosinophilia/rash  He is currently dialysis dependent since 2/1/2019  His creatinine continues to trend off dialysis  There is no urine output documented  Will continue to monitor postvoid residuals  - Status:  Remains dialysis dependent  Has been on dialysis since 2/1/2019  Creatinine continues to trend up off dialysis  - Plan:   awaiting outpatient placement for hemodialysis- avoid hypotension - monitoring for renal recovery - monitor postvoid residual/bladder scans - no renal recovery at this time, will need to continue dialysis, it is possible he may be end-stage if he does not show any recovery - underwent hemodialysis today  Hemodynamics were stable    Pretreatment weight was 154 7 kg, post weight was 152 5 kg, 3 potassium bath, 138 sodium bath, 35 bicarbonate bath, F 180 dialyzer, blood flow rate 450, dialysis flow rate 1 5 times blood flow rate, 4 hours, temperature 35 5°      2 ) Blood pressure/volume status  -most recent echocardiogram shows ejection fraction of 55%, history of systolic congestive heart failure  -he still has a good deal of edema  -plan for next dialysis on Thursday     3 ) MSSA bacteremia  -CHON unremarkable  -antibiotics as per Infectious Disease     4 ) Hyponatremia  -mild, likely volume mediated, stable  -will monitor with ultrafiltration on dialysis     5 ) Anemia  -hemoglobin stable      SUBJECTIVE / INTERVAL HISTORY:    Resting comfortably    OBJECTIVE:  Current Weight: Weight - Scale: (!) 160 kg (353 lb 1 6 oz)  Vitals:    03/12/19 1100 03/12/19 1130 03/12/19 1200 03/12/19 1230   BP: 130/53 115/56 114/60 122/61   BP Location: Left arm Left arm  Left arm   Pulse: 78 70 68 70   Resp: 16 16  18   Temp:       TempSrc:       SpO2:       Weight:       Height:           Intake/Output Summary (Last 24 hours) at 3/12/2019 1334  Last data filed at 3/12/2019 0850  Gross per 24 hour   Intake 300 ml   Output 0 ml   Net 300 ml       Review of Systems:    12 point ROS has been reviewed  Physical Exam   Constitutional: He is oriented to person, place, and time  He appears well-developed and well-nourished  No distress  HENT:   Head: Normocephalic and atraumatic  Eyes: Pupils are equal, round, and reactive to light  Conjunctivae are normal  No scleral icterus  Neck: Normal range of motion  Neck supple  Cardiovascular: Normal rate, regular rhythm, S1 normal, S2 normal and intact distal pulses  Exam reveals no gallop and no friction rub  No murmur heard  Pulmonary/Chest: Effort normal and breath sounds normal  No respiratory distress  He has no wheezes  He has no rales  Abdominal: Soft  Bowel sounds are normal  There is no tenderness  There is no rebound  Musculoskeletal: Normal range of motion  He exhibits edema  Neurological: He is alert and oriented to person, place, and time  Skin: No rash noted  Psychiatric: He has a normal mood and affect  His behavior is normal    Nursing note and vitals reviewed        Medications:    Current Facility-Administered Medications:     acetaminophen (TYLENOL) tablet 650 mg, 650 mg, Oral, Q6H PRN, KATHY Lugo, 650 mg at 03/11/19 1154    atorvastatin (LIPITOR) tablet 40 mg, 40 mg, Oral, Daily With Dinner, KATHY Khanna, 40 mg at 03/11/19 1802    b complex-vitamin C-folic acid (NEPHROCAPS) capsule 1 capsule, 1 capsule, Oral, Daily With Rex Guerrier MD, 1 capsule at 03/11/19 1802    bisacodyl (DULCOLAX) rectal suppository 10 mg, 10 mg, Rectal, Daily PRN, KATHY Khanna    ceftaroline (TEFLARO) 400 mg in sodium chloride 0 9 % 50 mL IVPB, 400 mg, Intravenous, Q12H, Mary Raygoza MD, Last Rate: 50 mL/hr at 03/12/19 1145, 400 mg at 03/12/19 1145    diltiazem (CARDIZEM CD) 24 hr capsule 180 mg, 180 mg, Oral, Daily, Johanny Sky MD, 180 mg at 03/11/19 1803    heparin (porcine) 25,000 units in 250 mL infusion (premix), 3-30 Units/kg/hr (Order-Specific), Intravenous, Titrated, Leigh Matos MD, Last Rate: 11 3 mL/hr at 03/12/19 0750, 9 Units/kg/hr at 03/12/19 0750    heparin (porcine) injection 10,000 Units, 10,000 Units, Intravenous, PRN, Doyle Quale, PA-C, 10,000 Units at 03/11/19 2234    heparin (porcine) injection 5,000 Units, 5,000 Units, Intravenous, PRN, Doyle Quale, PA-C, 5,000 Units at 03/11/19 0034    hydrocortisone 1 % cream, , Topical, BID, KATHY Khanna, 1 application at 42/93/81 9240    magnesium oxide (MAG-OX) tablet 400 mg, 400 mg, Oral, Daily, Pa Ramos MD, 400 mg at 03/12/19 7532    melatonin tablet 6 mg, 6 mg, Oral, HS, Leidy Martinez PA-C, 6 mg at 03/11/19 2200    metoprolol (LOPRESSOR) injection 5 mg, 5 mg, Intravenous, Q6H PRN, Enma Coats MD, 5 mg at 03/02/19 1150    metoprolol tartrate (LOPRESSOR) tablet 50 mg, 50 mg, Oral, TID, Pa Ramos MD, 50 mg at 03/11/19 1809   midodrine (PROAMATINE) tablet 10 mg, 10 mg, Oral, Before Dialysis, Miguelina Veras MD, 10 mg at 03/09/19 1023    nystatin (MYCOSTATIN) powder, , Topical, BID, KATHY Mckeon, 1 application at 86/04/56 0824    oxyCODONE (ROXICODONE) IR tablet 5 mg, 5 mg, Oral, Q4H PRN, Baylee Dash MD, 5 mg at 03/12/19 8416    oxyCODONE (ROXICODONE) IR tablet 7 5 mg, 7 5 mg, Oral, Q4H PRN, Baylee Dash MD, 7 5 mg at 03/10/19 2116    pantoprazole (PROTONIX) EC tablet 40 mg, 40 mg, Oral, BID AC, Suresh Eckert PA-C, 40 mg at 03/12/19 0520    polyvinyl alcohol (LIQUIFILM TEARS) 1 4 % ophthalmic solution 1 drop, 1 drop, Both Eyes, Q3H PRN, KATHY Mckeon, 1 drop at 03/05/19 0603    Laboratory Results:  Results from last 7 days   Lab Units 03/12/19  0458 03/11/19  6628 03/10/19  0705 03/09/19  7341 03/09/19  0935 03/08/19  0922 03/08/19  0430 03/07/19  2138 03/07/19  0505 03/07/19  0500  03/06/19  0553 03/06/19  0552   WBC Thousand/uL 8 98 8 41  --  10 33*  --   --  12 91*  --   --   --   --  12 11*  --    HEMOGLOBIN g/dL 8 7* 9 1*  --  8 0*  --  9 6* 9 3* 9 1* 8 3*  --    < > 8 6*  --    HEMATOCRIT % 28 1* 29 9*  --  26 4*  --  31 1* 30 4* 29 6* 26 9*  --    < > 27 0*  --    PLATELETS Thousands/uL 147* 158  --  135*  --   --  173  --   --   --   --  175  --    POTASSIUM mmol/L 4 5 3 9 4 0  --  3 9  --  3 5  --   --  3 7  --   --  3 5   CHLORIDE mmol/L 98* 96* 98*  --  98*  --  98*  --   --  101  --   --  101   CO2 mmol/L 27 31 28  --  28  --  31  --   --  26  --   --  28   BUN mg/dL 43* 34* 22  --  35*  --  24  --   --  31*  --   --  23   CREATININE mg/dL 4 86* 4 24* 3 37*  --  4 40*  --  3 34*  --   --  3 91*  --   --  3 19*   CALCIUM mg/dL 7 4* 7 7* 7 5*  --  7 4*  --  7 1*  --   --  7 2*  --   --  7 0*   MAGNESIUM mg/dL 2 0 1 9 1 9  --  1 8  --  1 8  --   --  1 8  --   --  1 8    < > = values in this interval not displayed

## 2019-03-12 NOTE — SOCIAL WORK
MARCIA called [facility] [phone] and spoke with Tarik Rodriguez and she states they are able to do BLS transport and will need 24 hour notice   Tarik Rodriguez states that [place] will need to call and set up bls transport for dialysis

## 2019-03-12 NOTE — MEDICAL STUDENT
Progress Note - Nephrology   Sander Langford 60 y o  male MRN: [id number]  [facility] Encounter: [id number]      Assessment:      Plan:  1) PATRICK secondary to Septic Shock: Septic Shock developed in setting of LUE cellulitis  Patient is hemodialysis dependent, which is being performed through his right IJ catheter  Patient to undergo HD today  Will receive dose of midodrine to allow better tolerance of HD  Continue daily evaluation of kidney function  Patient has significant edema that is being treated with HD      2) Cellulitis: Prior CT demonstrated non-specific edema as well as deep tissue edema  Necrotizing fasciitis could not be excluded  Surgery evaluated and stated that there was no indication for debridement  Patient's arm is not in pain, but remains edematous and red  Patient on ceftaroline  Patient remains afebrile     3) Electrolytes: Potassium has improved to 4 5  Mildly hyponatremic to 134  Continue to treat with fluid restriction and HD      4) Mineral Bone Disorder: Mag is at goal  Ionized calcium was low to 0 99 when checked  Subjective:   Patient was seen in dialysis unit  His main complaint today was that he was tired from poor sleep overnight, and he has significant knee pain  He says that several days ago he urinated  His left arm is not in significant pain  He says that he continues to have swelling in his legs  He has been eating and drinking normally without nausea  Objective:     Vitals: Blood pressure 98/55, pulse 72, temperature (!) 96 5 °F (35 8 °C), temperature source Tympanic, resp  rate 16, height 5' 9" (1 753 m), weight (!) 160 kg (353 lb 1 6 oz), SpO2 97 %  ,Body mass index is 52 14 kg/m²      Weight (last 2 days)     Date/Time   Weight    03/12/19 0600   160 (353 1)  (Abnormal)     03/11/19 0600   160 (352 96)  (Abnormal)     03/10/19 0600   161 (354 94)  (Abnormal)                 Intake/Output Summary (Last 24 hours) at 3/12/2019 1046  Last data filed at 3/12/2019 0850  Gross per 24 hour   Intake 300 ml   Output 0 ml   Net 300 ml       Permanent HD Catheter  (Active)   Reasons to continue HD Cath Treatment Therapy 2/26/2019  9:03 AM   Line Necessity Reviewed Yes, reviewed with provider 3/12/2019  8:50 AM   Site Assessment Clean;Dry; Intact 3/12/2019  8:50 AM   Proximal Lumen (Red Port-PICC only) Status Capped;Flushed 3/12/2019  8:50 AM   Medial Lumen (Purple/White Port-PICC only) Status Capped 3/6/2019  3:00 AM   Distal Lumen ([place] Port-PICC only) Status Capped;Flushed 3/12/2019  8:50 AM   Dressing Type Transparent; Chlorhexidine dressing 3/12/2019  8:50 AM   Dressing Status Clean;Dry; Intact 3/12/2019  8:50 AM   Dressing Intervention Dressing changed 3/11/2019 12:37 AM   Dressing Change Due 03/18/19 3/11/2019 12:37 AM       Physical Exam: /53 (BP Location: Left arm)   Pulse 78   Temp (!) 96 5 °F (35 8 °C) (Tympanic)   Resp 16   Ht 5' 9" (1 753 m)   Wt (!) 160 kg (353 lb 1 6 oz)   SpO2 97%   BMI 52 14 kg/m²     General Appearance:    Drowsy, cooperative, no distress, appears stated age   Head:    Normocephalic, without obvious abnormality, atraumatic   Throat:   Mucous membranes moist   Neck:   Supple, symmetrical, trachea midline, no JVD   Lungs:     Clear to auscultation bilaterally, respirations unlabored   Heart:    Regular rate and rhythm, S1 and S2 normal, no murmur, rub   or gallop   Abdomen:     Soft, non-tender, bowel sounds active all four quadrants,     no masses, no organomegaly   Extremities:   2+ pitting edema in bilateral LE to level of knees, significant edema and erythema in left UE    Skin:   Erythematous left UE, skin flaking in bilateral feet   Neurologic:   Grossly normal, patient is appropriately sleepy        Lab, Imaging and other studies:   CBC:   Lab Results   Component Value Date    WBC 8 98 03/12/2019    HGB 8 7 (L) 03/12/2019    HCT 28 1 (L) 03/12/2019    MCV 97 03/12/2019     (L) 03/12/2019    MCH 30 0 03/12/2019 MCHC 31 0 (L) 03/12/2019    RDW 15 3 (H) 03/12/2019    MPV 11 7 03/12/2019    NRBC 0 03/12/2019     CMP:   Lab Results   Component Value Date    SODIUM 134 (L) 03/12/2019    K 4 5 03/12/2019    CL 98 (L) 03/12/2019    CO2 27 03/12/2019    BUN 43 (H) 03/12/2019    CREATININE 4 86 (H) 03/12/2019    CALCIUM 7 4 (L) 03/12/2019    EGFR 12 03/12/2019     Phosphorus: No results found for: PHOS  Magnesium:   Lab Results   Component Value Date    MG 2 0 03/12/2019     Urinalysis: No results found for: COLORU, CLARITYU, SPECGRAV, PHUR, LEUKOCYTESUR, NITRITE, PROTEINUA, GLUCOSEU, KETONESU, BILIRUBINUR, BLOODU  Ionized Calcium: No results found for: Sabra Zendejas

## 2019-03-12 NOTE — SOCIAL WORK
CM called [facility] [phone] to discuss bariatric BLS transport   Cm left a voice message with Damien at [facility] , cm awaiting return call

## 2019-03-12 NOTE — PLAN OF CARE
Problem: Potential for Falls  Goal: Patient will remain free of falls  Description  INTERVENTIONS:  - Assess patient frequently for physical needs  -  Identify cognitive and physical deficits and behaviors that affect risk of falls  -  [place] fall precautions as indicated by assessment   - Educate patient/family on patient safety including physical limitations  - Instruct patient to call for assistance with activity based on assessment  - Modify environment to reduce risk of injury  - Consider OT/PT consult to assist with strengthening/mobility    Outcome: Progressing     Problem: Prexisting or High Potential for Compromised Skin Integrity  Goal: Skin integrity is maintained or improved  Description  INTERVENTIONS:  - Identify patients at risk for skin breakdown  - Assess and monitor skin integrity  - Assess and monitor nutrition and hydration status  - Monitor labs (i e  albumin)  - Assess for incontinence   - Turn and reposition patient  - Assist with mobility/ambulation  - Relieve pressure over bony prominences  - Avoid friction and shearing  - Provide appropriate hygiene as needed including keeping skin clean and dry  - Evaluate need for skin moisturizer/barrier cream  - Collaborate with interdisciplinary team (i e  Nutrition, Rehabilitation, etc )   - Patient/family teaching   Outcome: Progressing     Problem: Nutrition/Hydration-ADULT  Goal: Nutrient/Hydration intake appropriate for improving, restoring or maintaining nutritional needs  Description  Monitor and assess patient's nutrition/hydration status for malnutrition (ex- brittle hair, bruises, dry skin, pale skin and conjunctiva, muscle wasting, smooth red tongue, and disorientation)  Collaborate with interdisciplinary team and initiate plan and interventions as ordered  Monitor patient's weight and dietary intake as ordered or per policy  Utilize nutrition screening tool and intervene per policy   Determine patient's food preferences and provide high-protein, high-caloric foods as appropriate  INTERVENTIONS:  - Monitor oral intake, urinary output, labs, and treatment plans  - Assess nutrition and hydration status and recommend course of action  - Evaluate amount of meals eaten  - Assist patient with eating if necessary   - Allow adequate time for meals  - Recommend/ encourage appropriate diets, oral nutritional supplements, and vitamin/mineral supplements  - Order, calculate, and assess calorie counts as needed  - Recommend, monitor, and adjust tube feedings and TPN/PPN based on assessed needs  - Assess need for intravenous fluids  - Provide specific nutrition/hydration education as appropriate  - Include patient/family/caregiver in decisions related to nutrition   Outcome: Progressing     Problem: CARDIOVASCULAR - ADULT  Goal: Maintains optimal cardiac output and hemodynamic stability  Description  INTERVENTIONS:  - Monitor I/O, vital signs and rhythm  - Monitor for S/S and trends of decreased cardiac output i e  bleeding, hypotension  - Administer and titrate ordered vasoactive medications to optimize hemodynamic stability  - Assess quality of pulses, skin color and temperature  - Assess for signs of decreased coronary artery perfusion - ex   Angina  - Instruct patient to report change in severity of symptoms   Outcome: Progressing  Goal: Absence of cardiac dysrhythmias or at baseline rhythm  Description  INTERVENTIONS:  - Continuous cardiac monitoring, monitor vital signs, obtain 12 lead EKG if indicated  - Administer antiarrhythmic and heart rate control medications as ordered  - Monitor electrolytes and administer replacement therapy as ordered   Outcome: Progressing     Problem: METABOLIC, FLUID AND ELECTROLYTES - ADULT  Goal: Electrolytes maintained within normal limits  Description  INTERVENTIONS:  - Monitor labs and assess patient for signs and symptoms of electrolyte imbalances  - Administer electrolyte replacement as ordered  - Monitor response to electrolyte replacements, including repeat lab results as appropriate  - Instruct patient on fluid and nutrition as appropriate   Outcome: Progressing  Goal: Fluid balance maintained  Description  INTERVENTIONS:  - Monitor labs and assess for signs and symptoms of volume excess or deficit  - Monitor I/O and WT  - Instruct patient on fluid and nutrition as appropriate   Outcome: Progressing     Problem: PAIN - ADULT  Goal: Verbalizes/displays adequate comfort level or baseline comfort level  Description  Interventions:  - Encourage patient to monitor pain and request assistance  - Assess pain using appropriate pain scale  - Administer analgesics based on type and severity of pain and evaluate response  - Implement non-pharmacological measures as appropriate and evaluate response  - Consider cultural and social influences on pain and pain management  - Notify physician/advanced practitioner if interventions unsuccessful or patient reports new pain   Outcome: Progressing     Problem: INFECTION - ADULT  Goal: Absence or prevention of progression during hospitalization  Description  INTERVENTIONS:  - Assess and monitor for signs and symptoms of infection  - Monitor lab/diagnostic results  - Monitor all insertion sites, i e  indwelling lines, tubes, and drains  - Monitor endotracheal (as able) and nasal secretions for changes in amount and color  - [place] appropriate cooling/warming therapies per order  - Administer medications as ordered  - Instruct and encourage patient and family to use good hand hygiene technique  - Identify and instruct in appropriate isolation precautions for identified infection/condition    Outcome: Progressing     Problem: SAFETY ADULT  Goal: Patient will remain free of falls  Description  INTERVENTIONS:  - Assess patient frequently for physical needs  -  Identify cognitive and physical deficits and behaviors that affect risk of falls    -  [place] fall precautions as indicated by assessment   - Educate patient/family on patient safety including physical limitations  - Instruct patient to call for assistance with activity based on assessment  - Modify environment to reduce risk of injury  - Consider OT/PT consult to assist with strengthening/mobility    Outcome: Progressing  Goal: Maintain or return to baseline ADL function  Description  INTERVENTIONS:  -  Assess patient's ability to carry out ADLs; assess patient's baseline for ADL function and identify physical deficits which impact ability to perform ADLs (bathing, care of mouth/teeth, toileting, grooming, dressing, etc )  - Assess/evaluate cause of self-care deficits   - Assess range of motion  - Assess patient's mobility; develop plan if impaired  - Assess patient's need for assistive devices and provide as appropriate  - Encourage maximum independence but intervene and supervise when necessary  ¯ Involve family in performance of ADLs  ¯ Assess for home care needs following discharge   ¯ Request OT consult to assist with ADL evaluation and planning for discharge  ¯ Provide patient education as appropriate    Outcome: Progressing  Goal: Maintain or return mobility status to optimal level  Description  INTERVENTIONS:  - Assess patient's baseline mobility status (ambulation, transfers, stairs, etc )    - Identify cognitive and physical deficits and behaviors that affect mobility  - Identify mobility aids required to assist with transfers and/or ambulation (gait belt, sit-to-stand, lift, walker, cane, etc )  - [place] fall precautions as indicated by assessment  - Record patient progress and toleration of activity level on Mobility SBAR; progress patient to next Phase/Stage  - Instruct patient to call for assistance with activity based on assessment  - Request Rehabilitation consult to assist with strengthening/weightbearing, etc     Outcome: Progressing     Problem: DISCHARGE PLANNING  Goal: Discharge to home or other facility with appropriate resources  Description  INTERVENTIONS:  - Identify barriers to discharge w/patient and caregiver  - Arrange for needed discharge resources and transportation as appropriate  - Identify discharge learning needs (meds, wound care, etc )  - Arrange for interpretive services to assist at discharge as needed  - Refer to Case Management Department for coordinating discharge planning if the patient needs post-hospital services based on physician/advanced practitioner order or complex needs related to functional status, cognitive ability, or social support system   Outcome: Progressing     Problem: Knowledge Deficit  Goal: Patient/family/caregiver demonstrates understanding of disease process, treatment plan, medications, and discharge instructions  Description  Complete learning assessment and assess knowledge base    Interventions:  - Provide teaching at level of understanding  - Provide teaching via preferred learning methods   Outcome: Progressing     Problem: GENITOURINARY - ADULT  Goal: Maintains or returns to baseline urinary function  Description  INTERVENTIONS:  - Assess urinary function  - Encourage oral fluids to ensure adequate hydration  - Administer IV fluids as ordered to ensure adequate hydration  - Administer ordered medications as needed  - Offer frequent toileting  - Follow urinary retention protocol if ordered   Outcome: Progressing  Goal: Absence of urinary retention  Description  INTERVENTIONS:  - Assess patient?s ability to void and empty bladder  - Monitor I/O  - Bladder scan as needed  - Discuss with physician/AP medications to alleviate retention as needed  - Discuss catheterization for long term situations as appropriate   Outcome: Progressing     Problem: DISCHARGE PLANNING - CARE MANAGEMENT  Goal: Discharge to post-acute care or home with appropriate resources  Description  INTERVENTIONS:  - Conduct assessment to determine patient/family and health care team treatment goals, and need for post-acute services based on payer coverage, community resources, and patient preferences, and barriers to discharge  - Address psychosocial, clinical, and financial barriers to discharge as identified in assessment in conjunction with the patient/family and health care team  - Arrange appropriate level of post-acute services according to patient's   needs and preference and payer coverage in collaboration with the physician and health care team  - Communicate with and update the patient/family, physician, and health care team regarding progress on the discharge plan  - Arrange appropriate transportation to post-acute venues  - Pt to d/c with appropriate resources when medically stable     Outcome: Progressing

## 2019-03-12 NOTE — PROGRESS NOTES
Cardiology Progress Note - Mervat Oh 60 y o  male MRN: [id number]    Unit/Bed#: [facility] 834-01 Encounter: [id number]      Assessment:  Principal Problem:    MSSA bacteremia - Resolved septic shock  Active Problems:    Essential hypertension    Stress-induced cardiomyopathy    Acute respiratory failure with hypoxia (HCC)    History of aortic valve replacement with bioprosthetic valve    Persistent atrial fibrillation (HCC)    Thrombocytopenia (HCC)    Acute blood loss anemia - Gastric ulcer    Cerebrovascular accident ([address] )    Acute metabolic encephalopathy    Skin rash    Acute renal failure    Hypovolemic shock (HCC)    GI bleed    Left arm swelling    Deep tissue injury    Dysphagia    Cellulitis/myositis of left forearm    Left internal jugular vein thrombus    Atrial flutter/fibrillation - Stress-induced cardiomyopathy     Morbid obesity       Plan:  Patient is comfortable this morning  He has no chest pain or significant dyspnea  He is in atrial flutter on telemetry with a controlled ventricular response  Potassium today is 4 5 with a creatinine of 4 86  His hemoglobin is 8 7  Patient for hemodialysis today  He has a right PermCath  Vital signs are stable on his current medical regimen  Patient is not felt to be a candidate for long-term anticoagulation because of bleeding risk  When patient medically stabilizes he may be a candidate for a Watchman device but would need short-term anticoagulation in that setting as well  Subjective:   Patient seen and examined  No significant events overnight   negative  Objective:     Vitals: Blood pressure 119/60, pulse 78, temperature 98 °F (36 7 °C), temperature source Oral, resp  rate 18, height 5' 9" (1 753 m), weight (!) 160 kg (353 lb 1 6 oz), SpO2 97 %  , Body mass index is 52 14 kg/m² ,   Orthostatic Blood Pressures      Most Recent Value   Blood Pressure  119/60 filed at 03/12/2019 0700   Patient Position - Orthostatic VS  Lying filed at 03/12/2019 0700      ,      Intake/Output Summary (Last 24 hours) at 3/12/2019 0829  Last data filed at 3/11/2019 2300  Gross per 24 hour   Intake 100 ml   Output 0 ml   Net 100 ml       No significant arrhythmias seen on telemetry review  Atrial flutter with a controlled ventricular response      Physical Exam:    GEN: Yulissa Mayers  NECK: supple, no carotid bruits, no JVD or HJR  HEART: normal rate, regular rhythm, normal S1 and S2, no murmurs, clicks, gallops or rubs   LUNGS: clear to auscultation bilaterally; no wheezes, rales, or rhonchi   ABDOMEN: normal bowel sounds, soft, no tenderness, no distention  EXTREMITIES: peripheral pulses normal; no clubbing, cyanosis, or edema  SKIN: warm and well perfused, no suspicious lesions on exposed skin    Labs & Results:    Admission on 01/24/2019   No results displayed because visit has over 200 results  Xr Chest Portable    Result Date: 2/22/2019  Narrative: CHEST INDICATION:   ETT placement  COMPARISON:  2/21/2019 EXAM PERFORMED/VIEWS:  XR CHEST PORTABLE FINDINGS:  There has been interval placement of an endotracheal tube which projects approximately 3 cm above the raad  Lines and tubes are otherwise unchanged from prior exam  Heart shadow is enlarged but unchanged from prior exam  There is pulmonary vascular congestion which appears unchanged from the prior study  Osseous structures appear within normal limits for patient age  Impression: Endotracheal tube in appropriate position  Cardiomegaly with stable mild pulmonary vascular congestion  Workstation performed: [id number]     Xr Chest Portable    Result Date: 2/22/2019  Narrative: CHEST INDICATION:   line placement  COMPARISON:  2/8/2019 EXAM PERFORMED/VIEWS:  XR CHEST PORTABLE FINDINGS:  Left IJ line noted with tip projecting over the left apex likely within the left brachiocephalic vein  Right dual-lumen catheter tip projects over the cavoatrial junction in stable position   Mild cardiomegaly appears stable  Mild central congestion noted and trace left basilar pleural effusion as before  Osseous structures appear within normal limits for patient age  Impression: Tubes and lines as above without pneumothorax  Mild central congestion and left trace basilar effusion  Workstation performed: [id number]     Xr Knee 1 Or 2 Vw Left    Result Date: 3/8/2019  Narrative: LEFT KNEE INDICATION:   knee pain s/p fall  COMPARISON:  1/7/2014 VIEWS:  XR KNEE 1 OR 2 VW LEFT FINDINGS: There is no acute fracture or dislocation  There is a moderate joint effusion  There has been progressive moderate narrowing of the medial compartment  There are small patellofemoral osteophytes  No lytic or blastic lesions are seen  Soft tissues are unremarkable  Impression: No acute osseous abnormality  Progressive moderate narrowing of the medial compartment with moderate effusion  Workstation performed: [id number]     Ct Head Wo Contrast    Result Date: 2/10/2019  Narrative: CT BRAIN - WITHOUT CONTRAST INDICATION:   Confusion/delirium, altered LOC, unexplained  COMPARISON:  CT head 1/23/2019 TECHNIQUE:  CT examination of the brain was performed  In addition to axial images, coronal 2D reformatted images were created and submitted for interpretation  Radiation dose length product (DLP) for this visit:  967 mGy-cm   This examination, like all CT scans performed in the [facility], was performed utilizing techniques to minimize radiation dose exposure, including the use of iterative reconstruction and automated exposure control  IMAGE QUALITY:  Diagnostic  FINDINGS: PARENCHYMA: Interval development of a ill-defined hypodense area in the right posterior parietal occipital region (series 2 image 32)  Similar area of decreased attenuation is present in the left posterior parietal occipital region (series 2 image 28)  Stable hypodense area within the left cerebellum  No significant mass effect or midline shift   No CT signs of acute infarction  No acute parenchymal hemorrhage  Hypodense areas in the bilateral frontal lobes similar to prior exam likely representing artifact  VENTRICLES AND EXTRA-AXIAL SPACES:  Normal for the patient's age  VISUALIZED ORBITS AND PARANASAL SINUSES:  Mild paranasal sinus mucosal thickening  There is partial opacification of the bilateral mastoid air cells  CALVARIUM AND EXTRACRANIAL SOFT TISSUES:  Normal      Impression: Interval development of subtle areas of decreased attenuation along the gray matter white matter interface in the bilateral parieto-occipital regions  Differential considerations include infectious etiologies such as abscess or septic emboli, metastatic disease or ischemia  No intracranial hemorrhage or significant mass effect  Further characterization with brain MRI should be considered  The study was marked in [facility] for immediate notification  Workstation performed: [id number]     Cta Abdomen Pelvis W Wo Contrast    Result Date: 2/22/2019  Narrative: CT ABDOMEN AND PELVIS - WITHOUT AND WITH IV CONTRAST INDICATION:   GI bleeding  Melena  COMPARISON: None  TECHNIQUE:  CT examination of the abdomen and pelvis was performed both prior to and after the administration of intravenous contrast  Axial, sagittal, and coronal 2D reformatted images were created from the source data and submitted for interpretation  Radiation dose length product (DLP) for this visit:  0613 mGy-cm   This examination, like all CT scans performed in the [facility], was performed utilizing techniques to minimize radiation dose exposure, including the use of iterative reconstruction and automated exposure control  IV Contrast:  100 mL of iodixanol (VISIPAQUE) was administered intravenously without immediate adverse reaction  Enteric Contrast:  Enteric contrast was not administered   FINDINGS: ABDOMEN  BOWEL:  There are small foci of hyperdensity within the gastric antrum/pylorus area (series 3, image 50) not definitely present on noncontrast images concerning for active hemorrhage  In addition there is increased density within the rectum (series 3, image 189) which is more conspicuous on the postcontrast imaging where additional active hemorrhage is not excluded  There is severe distention of the entire colon with liquid stool and gas measuring up to 9 3 cm in diameter with loss of the haustra within the descending colon to rectum  There is areas of pneumatosis within the colon seen predominantly within the splenic flexure and sigmoid colon  LOWER CHEST:  Bibasilar atelectasis  Partially visualized central venous catheter within the right atrium  LIVER/BILIARY TREE:  Redemonstrated hypodensities within the left hepatic lobe measuring 3 3 cm and 1 6 cm respectively  GALLBLADDER:  No calcified gallstones  No pericholecystic inflammatory change  SPLEEN:  Unremarkable  PANCREAS:  Atrophic  ADRENAL GLANDS:  Unremarkable  KIDNEYS/URETERS:  Unremarkable  No hydronephrosis  PELVIS REPRODUCTIVE ORGANS:  Unremarkable for patient's age  URINARY BLADDER:  Unremarkable  APPENDIX: No findings to suggest appendicitis  ADDITIONAL ABDOMINAL AND PELVIC STRUCTURES ABDOMINOPELVIC CAVITY:  No ascites or free intraperitoneal air  No lymphadenopathy  Retroperitoneal stranding along the inferior iliopsoas muscle on the left  ABDOMINAL WALL/INGUINAL REGIONS:  Unremarkable  OSSEOUS STRUCTURES:  No acute fracture or destructive osseous lesion  Impression: 1  Small foci of hyperdensity within the gastric antrum/pylorus area concerning for primary site of active hemorrhage given history of melanoma  In addition, there is a small focus of increased density within the rectum where additional active hemorrhage is not excluded  2   Extensive distention of the entire colon with liquid stool and gas with loss of haustra within the descending colon and rectum    There are areas of pneumatosis seen within the splenic flexure and sigmoid colon  Correlate clinically for C  difficile colitis, findings may be seen in the setting of toxic megacolon  3   Retroperitoneal stranding along the inferior iliopsoas muscle on the left, correlate for recent instrumentation  Underlying vasculature remains patent  I personally discussed the location of bleeding with Dr Colonel Saldana on 2/22/2019 at 4:35 AM  I personally discussed the possibility for C  difficile colitis and pneumatosis with Alexys Garcia on 2/22/2019 at 4:43 AM  Workstation performed: [id number]     Ir Central Line Placement    Result Date: 3/1/2019  Narrative: Examination: Right neck double lumen Power PICC 3/1/2019 Contrast: None Fluoroscopy time: 0 2 minutes Images:  2 Conscious sedation time:  Not applicable Procedure: The patient was identified verbally and by wristband  Timeout was performed  Informed consent was obtained  All elements of maximal sterile barrier technique, cap and mask and sterile gown and sterile gloves and sterile full-body drape and hand hygiene and 2% chlorhexidine for cutaneous antisepsis  Sterile ultrasound technique with sterile gel and sterile probe covers was also utilized  Following obtaining informed consent, the patient was prepped and draped in the usual sterile fashion  Using ultrasound guidance, access was gained to the patient's right internal jugular vein using a micropuncture system  The micropuncture dilator was exchanged for a peel away sheath  A mandril wire was advanced to the level of the RA/SVC junction to measure the catheter length  The catheter was cut to size and placed through the peel away sheath  The lumens were flushed with ease  The catheter was secured in place  The patient tolerated the procedure well without apparent immediate complication  The patient left the IR department in unchanged condition  Dr Vivek mak performed the procedure   Findings: Using ultrasound guidance, the right internal jugular vein was cannulated using Seldinger technique  The right internal jugular vein was evaluated as a potential access site  The right internal jugular vein was patent, and free of thrombus  Static images of the vessel was obtained  Visualization of real time needle entry into the vessel was obtained  A fluoroscopic spot image demonstrated a newly placed PICC with the central aspect at the RA/SVC junction  The catheter tubing is smooth in contour  Impression: Impression: Successful placement of a 16 cm right neck double lumen Power PICC  Workstation performed: [id number]     Ct Forearm Left Wo Contrast    Result Date: 3/3/2019  Narrative: CT left forearm with without INDICATION: swelling and pain  COMPARISON: None  TECHNIQUE: CT examination of the left forearm was performed  This examination, like all CT scans performed in the [facility], was performed utilizing techniques to minimize radiation dose exposure, including the use of iterative reconstruction and automated exposure control software  Sagittal and coronal two dimensional reconstructed images were also submitted for interpretation  IV Contrast: Without Rad dose  1027 mGy-cm FINDINGS: OSSEOUS STRUCTURES:  No fracture, dislocation or destructive osseous lesion  VISUALIZED MUSCULATURE:  Unremarkable  SOFT TISSUES:  There is soft tissue infiltration noted in the forearm in the volar, dorsal aspect is infiltration in the subcutaneous tissue  Edema and fluid along the superficial aspect of the deep peripheral fascia  There is no drainable fluid collection seen Mild edema in the volar musculature is also suggested suggesting associated myositis  OTHER PERTINENT FINDINGS:  None       Impression: Extensive soft tissue edema in the subcutaneous is fat with infiltration along the peripheral aspect of the deep  fascia and mild edema in the volar compartment musculatures suggest superficial and deep cellulitis with mild myositis Lack of contrast limits the evaluation for detecting abscess however there is no large fluid collection with air-fluid level noted on this study No lytic lesion or periosteal reaction seen The study was marked in EPIC for significant notification  Workstation performed: [id number]     Ct Forearm Left W Contrast    Result Date: 3/5/2019  Narrative: CT left forearm with IV contrast INDICATION: Forearm erythema, swelling, cellulitis suspected  COMPARISON: 3/3/2019 TECHNIQUE: CT examination of the left forearm was performed  This examination, like all CT scans performed in the [facility], was performed utilizing techniques to minimize radiation dose exposure, including the use of iterative reconstruction and automated exposure control software  Sagittal and coronal two dimensional reconstructed images were also submitted for interpretation  IV Contrast: 100 mL of iodixanol (VISIPAQUE) Rad dose  630 39 mGy-cm FINDINGS: OSSEOUS STRUCTURES:  No fracture, dislocation or destructive osseous lesion  VISUALIZED MUSCULATURE:  Unremarkable  SOFT TISSUES:  Extensive subcutaneous edema throughout the forearm and dorsum of the hand with associated radial aspect muscle edema concerning for cellulitis as well as nonspecific myositis  There is no gross evidence of drainable abscess collection at  this time  No underlying osseous destructive lesion  Fascial edema appears mostly superficial with some deep fascial edema noted along the radial soft tissues of the upper forearm raising suspicion for necrotizing fasciitis without onelia soft tissue emphysema at this time  OTHER PERTINENT FINDINGS:  None  Impression: Extensive subcutaneous edema throughout the forearm with associated radial muscle edema concerning for nonspecific cellulitis and myositis  Additionally, there is deep fascial edema noted along the radial soft tissues of the proximal forearm raising suspicion for necrotizing fasciitis without onelia soft tissue emphysema at this time    No drainable abscess collection  No underlying osseous destructive changes to indicate osteomyelitis  I personally discussed this study with Dr Librado Cornell on 3/5/2019 at 9:31 AM  Workstation performed: [id number]     Vas Carotid Complete Study    Result Date: 2/14/2019  Narrative:  [facility] REPORT CLINICAL: Indications: PT admitted to hospital with septic shock and change in mental status  Physician wants to rule out carotid artery stenosis  Operative History: Left Aortic valve replacement Perma cath Risk Factors The patient has history of morbid obesity, HTN, AFIB, MI, cardiomyopathy, and HLD  The patient's current BMI is 53 9, Weight is 365 lb and height is 69 in  He is a non-smoker  Clinical: Brachial BP: Right:   IV site  Left: 129/67 mmHg  FINDINGS:  Right        Impression         PSV  EDV (cm/s)  Direction of Flow  Ratio  Dist  ICA                       117          27                      1 39  Mid  ICA                         74          23                      0 87  Prox  ICA    1 - 49%            116          22                      1 37  Dist CCA                         78          15                            Mid CCA                          85          14                      0 85  Prox CCA                         99          18                            Ext Carotid                      70           8                      0 82  Prox Vert                        78          27  Antegrade                 Subclavian   moderate stenosis  318          25                             Left         Impression  PSV  EDV (cm/s)  Direction of Flow  Ratio  Dist  ICA                 75          21                      0 63  Mid  ICA                 129          29                      1 09  Prox   ICA    50 - 69%    154          38                      1 30  Dist CCA                 127          27                            Mid CCA                  119          10                      1 46  Prox CCA                  81          16                            Ext Carotid              134          14                      1 13  Prox Vert                 39           9  Antegrade                 Subclavian               129          11                               CONCLUSION:  Impression  RIGHT: There is <50% stenosis noted in the tortuous internal carotid artery  Plaque is heterogenous and smooth  Vertebral artery flow is antegrade  There is a moderate stenosis in the proximal subclavian artery  LEFT: There is a 50-69% stenosis noted in the tortuous internal carotid artery  Plaque is heterogenous and smooth  Vertebral artery flow is antegrade  There is no significant subclavian artery disease  Technically difficult study secondary to morbid obesity and severe heavy respiration  Technical findings faxed to chart  Compared to previous study on 07/18/2014, there is an increase in the disease process  Recommend repeat testing in 6 month as per protocol unless otherwise indicated  Internal carotid artery stenosis determination by consensus criteria from: Carrington Norris et al  Carotid Artery Stenosis: Gray-Scale and Doppler US Diagnosis - [employer] in [address] [place] [address] [address], Radiology 2003; [phone]  SIGNATURE: Electronically Signed by: Jessee Ramírez MD on 2019-02-14 02:46:02 PM    Vas Upper Limb Venous Duplex Scan, Unilateral/limited    Result Date: 3/6/2019  Narrative:  [facility] REPORT CLINICAL: Indications: Follow up evaluation to recent left arm DVT and superficial thrombophlebitis  He is currently receiving Heparin injections  Physician wants to rule out propagation of thrombus   Operative History: 2014-07-30 Left Aortic valve replacement 2014-07-18 Cardiac Catheterization Right Perma cath Risk Factors The patient has history of morbid obesity, HTN, AFIB, HLD, CKD, acute renal failure, dialysis, cardiomyopathy, CVA, DVT, superficial thrombophlebitis, left arm cellulitis, ESRD, and CAD  He is a non-smoker  FINDINGS:  Segment       Right            Left                        Impression       Impression              Int  Jugular  Normal (Patent)  Non Occlusive Thrombus  Ceph Upper                     Thrombus                Ceph Mid Arm                   Thrombus                Ceph AC                        Thrombus                   CONCLUSION:  Impression RIGHT UPPER LIMB LIMITED: Evaluation shows no evidence of thrombus in the internal jugular vein, subclavian vein, and the brachiocephalic vein  LEFT UPPER LIMB:  ABNORMAL: Evidence of non-occlusive acute deep vein thrombosis noted in the internal jugular vein  Evidence of non-occlusive acute vs subacute superficial thrombophlebitis noted in the cephalic vein from antecubital fossa to the proximal upper arm  Doppler evaluation shows a normal response to augmentation maneuvers  In comparison to the study of 03/01/2019, there is no significant change  Technically difficult bedside study secondary to morbid obesity and inability to re-position patient  Technical findings faxed to chart  SIGNATURE: Electronically Signed by: Jeny Ghotra on 2019-03-06 04:06:16 PM    Vas Upper Limb Venous Duplex Scan, Unilateral/limited    Result Date: 3/1/2019  Narrative:  [facility] REPORT CLINICAL: Indications: Patient presents with left upper extremity edema and pain  Operative History: 2014-07-30 Left Aortic valve replacement 2014-07-18 Cardiac Catheterization Right Perma cath Risk Factors The patient has history of Obesity, HTN, HLD, CKD and CAD  The patient current BMI is 52 71, Weight is 357 lb and height is 69 in    FINDINGS:  Segment          Right            Left                              Impression       Impression              Innominate Vein  Normal (Patent)  Normal (Patent)         Int  Jugular     Normal (Patent)  Non Occlusive Thrombus  Subclavian       Normal (Patent)  Normal (Patent)         Axillary Normal (Patent)            CONCLUSION:  Impression RIGHT UPPER LIMB LIMITED: Evaluation shows no evidence of thrombus in the internal jugular vein, subclavian vein, and the brachiocephalic vein  LEFT UPPER LIMB: Evidence of non-occlusive acute deep vein thrombosis noted in the internal jugular vein  Evidence of non-occlusive acute vs subacute superficial thrombophlebitis noted in the cephalic vein from antecubital fossa to the proximal upper arm  Doppler evaluation shows a normal response to augmentation maneuvers  Technical findings given to bedside RN  SIGNATURE: Electronically Signed by: Connie Anderson on 2019-03-01 12:34:05 PM    Us Abdomen Limited    Result Date: 2/19/2019  Narrative: ABDOMEN ULTRASOUND, LIMITED, FOUR QUADRANT SURVEY INDICATION:    Abdominal distention  COMPARISON:  CT scan 1/23/2019  TECHNIQUE: Targeted four-quadrant ultrasound examination of the abdominal cavity was performed with a curvilinear transducer with standard grey scale imaging techniques  FINDINGS: There is no free fluid  Impression: No ascites  Workstation performed: [id number]     Ir Permacath Placement    Result Date: 2/18/2019  Narrative: EXAMINATION: Tunneled hemodialysis catheter placement INDICATION: 21-year-old male admitted with multifactorial shock and PATRICK underwent tunneled hemodialysis catheter placement on  2/4/2019  Catheter was completely dislodged and patient returns for replacement of the hemodialysis catheter  CONTRAST: None FLUOROSCOPY TIME:   0 4min IMAGES:  3 ANESTHESIA: Moderate sedation and local lidocaine PROCEDURE:  The patient was identified verbally and by wristband  Timeout was performed  Informed consent was obtained  Following obtaining informed consent, the patient was prepped and draped in the usual sterile fashion    All elements of maximal sterile barrier technique, cap and mask and sterile gown and sterile gloves and sterile full-body drape and hand hygiene and 2% chlorhexidine for cutaneous antisepsis  Sterile ultrasound technique with sterile gel and sterile probe covers was also utilized  1% local lidocaine with epinephrine was infiltrated into the skin and subcutaneous tissues overlying the right base of neck  Under ultrasound guidance, a micropuncture needle was used to gain access into the right IJV  Images were saved  Access was secured using a 5-Lithuanian transitional sheath  A microwire was used to measure the length needed for catheter  Local anesthetic was infiltrated into the right chest wall subcutaneous tissues and skin  A 1 cm incision was made and a 14  5-Lithuanian 32 cm Med[employer] Hemo-Flow double-lumen hemodialysis catheter was tunneled within the subcutaneous tissues and brought out at the right IJV puncture site  A J-wire was advanced through the transitional sheath into the IVC  The tract was dilated and a peel-away sheath was advanced over the wire  The dialysis catheter was advanced through the peel-away sheath, followed by removal of the sheath  Final positioning of the catheter was verified under fluoroscopy  Catheter was found to aspirate and flush easily  Both lumens were locked with heparin  Catheter was secured to skin using 2-0 Prolene suture and sterile dressing was applied  Histoacryl glue was applied over the right IJV puncture site  The patient tolerated the procedure well without complication  The patient left the IR department in stable condition  Findings: 1  Using ultrasound guidance, the right IJ vein was cannulated using seldinger technique  The right IJ vein was evaluated as a potential access site  The right IJ vein was patent, and free of thrombus  Static images of the vessel was obtained  Visualization of real time needle entry into the vessel was obtained  2   Successful placement of tunneled hemodialysis catheter through right IJV access using 14  5-Lithuanian 32 cm Medcomp Hemo-Flow catheter       Impression: Impression: Successful placement of tunneled hemodialysis catheter through right IJV access  Workstation performed: [id number]       EKG personally reviewed by Svetlana Garcia MD      Counseling / Coordination of Care  Total floor / unit time spent today 30 minutes  Greater than 50% of total time was spent with the patient and / or family counseling and / or coordination of care

## 2019-03-12 NOTE — PROGRESS NOTES
[address] Internal Medicine Progress Note  Patient: Georges Recio 60 y o  male   MRN: [id number]  PCP: Carmelo Robins DO  Unit/Bed#: [facility] 834-01 Encounter: [id number]  Date Of Visit: 03/12/19    Assessment:    Principal Problem:    MSSA bacteremia - Resolved septic shock  Active Problems:    Essential hypertension    Stress-induced cardiomyopathy    Acute respiratory failure with hypoxia ([facility] [address] )    History of aortic valve replacement with bioprosthetic valve    Persistent atrial fibrillation (HCC)    Thrombocytopenia (HCC)    Acute blood loss anemia - Gastric ulcer    Cerebrovascular accident ([facility] [address] )    Acute metabolic encephalopathy    Skin rash    Acute renal failure    Hypovolemic shock (HCC)    GI bleed    Left arm swelling    Deep tissue injury    Dysphagia    Cellulitis/myositis of left forearm    Left internal jugular vein thrombus    Atrial flutter/fibrillation - Stress-induced cardiomyopathy     Morbid obesity        Plan:    1  MSSA bacteremia, negative CHON, negative repeat BC, completed 6 weeks of IV antibiotic   2  Recent shock, likely multifactorial,  cardiogenic/septic, resolved  3  PATRICK secondary to ATN, no sign of renal recovery, now HD dependent on MWF, nephrology is following  4  Acute systolic CHF/stress cardiomyopathy, improving EF from 30 to 55%, cardiology is following  5  S/p bioprosthetic AVR, negative CHON for vegetation  6  Acute hypoxic respiratory failure secondary to above, resolved,  s/p extubation, continue supportive care  7  New onset AFib/ A flutter, on Cardizem and Lopressor, per Cardiology no chronic anticoagulation due to bleeding risk, he may be a candidate for Watchman device in the future   8  Toxic metabolic encephalopathy with abnormal head CT scan, evaluated by Neurology,  likely ischemic event bilaterally, continue aspirin and statin, bilateral carotid ultrasound without significant stenosis  9   Acute blood loss anemia secondary to esophagitis, gastric ulcer and ischemic colitis, per GI plan to repeat EGD in 6-8 weeks,  continue PPI  10  Left forearm myositis/cellulitis, skin rash with peripheral eosinophilia,  per ID, continue with IV ceftaroline through 3/16  11  Left IJ DVT with superficial thrombophlebitis, likely provoked by central line, due to risk of bleeding cardiology recommending no AC, discontinue heparin drip  12  Morbid obesity    VTE Pharmacologic Prophylaxis:   Pharmacologic: Heparin  Mechanical VTE Prophylaxis in Place: Yes    Patient Centered Rounds: I have performed bedside rounds with nursing staff today  Discussions with Specialists or Other Care Team Provider:     Education and Discussions with Family / Patient:  Patient    Time Spent for Care: 30 minutes  More than 50% of total time spent on counseling and coordination of care as described above  Current Length of Stay: 47 day(s)    Current Patient Status: Inpatient   Certification Statement: The patient will continue to require additional inpatient hospital stay due to Management of cellulitis    Discharge Plan / Estimated Discharge Date: not ready yet    Code Status: Level 1 - Full Code      Subjective:   Patient seen and examined  Comfortable in bed  No chest pain or shortness of breath  stable AFib on the monitor    Objective:     Vitals:   Temp (24hrs), Av 7 °F (36 5 °C), Min:96 5 °F (35 8 °C), Max:98 1 °F (36 7 °C)    Temp:  [96 5 °F (35 8 °C)-98 1 °F (36 7 °C)] 96 5 °F (35 8 °C)  HR:  [68-78] 70  Resp:  [16-20] 18  BP: ()/(51-71) 122/61  SpO2:  [93 %-97 %] 97 %  Body mass index is 52 14 kg/m²  Input and Output Summary (last 24 hours):        Intake/Output Summary (Last 24 hours) at 3/12/2019 1253  Last data filed at 3/12/2019 0850  Gross per 24 hour   Intake 300 ml   Output 0 ml   Net 300 ml       Physical Exam:     Physical Exam  Patient is awake alert in no acute distress  Lung clear to auscultation bilateral anteriorly  Heart positive S1-S2 irregular no murmur  Abdomen soft obese nontender  Lower extremities b/l pitting edema    Additional Data:     Labs:    Results from last 7 days   Lab Units 03/12/19  0458   WBC Thousand/uL 8 98   HEMOGLOBIN g/dL 8 7*   HEMATOCRIT % 28 1*   PLATELETS Thousands/uL 147*   NEUTROS PCT % 69   LYMPHS PCT % 7*   MONOS PCT % 6   EOS PCT % 16*     Results from last 7 days   Lab Units 03/12/19  0458   POTASSIUM mmol/L 4 5   CHLORIDE mmol/L 98*   CO2 mmol/L 27   BUN mg/dL 43*   CREATININE mg/dL 4 86*   CALCIUM mg/dL 7 4*           * I Have Reviewed All Lab Data Listed Above  * Additional Pertinent Lab Tests Reviewed:  [address] Admission Reviewed    Imaging:    Imaging Reports Reviewed Today Include:   Imaging Personally Reviewed by Myself Includes:     Recent Cultures (last 7 days):           Last 24 Hours Medication List:     Current Facility-Administered Medications:  acetaminophen 650 mg Oral Q6H PRN KENIA ShahNP    atorvastatin 40 mg Oral Daily With Delbert Fox, [address]    b complex-vitamin C-folic acid 1 capsule Oral Daily With Joey Smalls MD    bisacodyl 10 mg Rectal Daily PRN KENIA ShahNP    ceftaroline (TEFLARO)  mg Intravenous Q12H Mary Chanel MD Last Rate: 400 mg (03/12/19 1145)   diltiazem 180 mg Oral Daily Sofía Adams MD    heparin (porcine) 3-30 Units/kg/hr (Order-Specific) Intravenous Titrated Jahaira Wright MD Last Rate: 9 Units/kg/hr (03/12/19 0750)   heparin (porcine) 10,000 Units Intravenous PRN Camron Elizalde PA-C    heparin (porcine) 5,000 Units Intravenous PRN Camron Elizalde PA-C    hydrocortisone  Topical BID Kristal Bond, CRNP    magnesium oxide 400 mg Oral Daily Malik Gloria MD    melatonin 6 mg Oral HS Leidy Martinez PA-C    metoprolol 5 mg Intravenous Q6H PRN Linwood Alfonso MD    metoprolol tartrate 50 mg Oral TID Malik Gloria MD    midodrine 10 mg Oral Before Dialysis Malik Gloria MD    nystatin  Topical BID Vamsi KATHY Peters    oxyCODONE 5 mg Oral Q4H PRN Art Box Marcelo Lozano MD    oxyCODONE 7 5 mg Oral Q4H PRN Batsheva Kenney MD    pantoprazole 40 mg Oral BID AC Suresh Eckert PA-C    polyvinyl alcohol 1 drop Both Eyes Q3H PRN KATHY Pro         Today, Patient Was Seen By: Jaylin Carrillo DO    ** Please Note: This note has been constructed using a voice recognition system   **

## 2019-03-12 NOTE — PLAN OF CARE
Problem: PHYSICAL THERAPY ADULT  Goal: Performs mobility at highest level of function for planned discharge setting  See evaluation for individualized goals  Description  Treatment/Interventions: LE strengthening/ROM, Therapeutic exercise, Endurance training, Patient/family training, Cognitive reorientation, Equipment eval/education, Bed mobility, Spoke to advanced practitioner, Spoke to case management, Spoke to nursing  Equipment Recommended:  (TBD- may benefit from [facility] bed)       See flowsheet documentation for full assessment, interventions and recommendations  Note:   Prognosis: Fair  Problem List: Decreased strength, Decreased range of motion, Decreased endurance, Impaired balance, Decreased mobility, Decreased coordination, Decreased cognition, Impaired judgement, Decreased safety awareness, Decreased skin integrity, Obesity(Pt self-limiting behavior/ possible depression  MD informed )  Assessment: Pt seen for physical therapy treatment  Pt needed significant encouragement to participate  Pt appeared to possibly be depressed at times  ( MD informed by PT )   Pt presented with self-limiting behaviors  Pt also presented with possible L sided neglect, difficulty tracking to L side (visually), L  weakness as compared to R side, and with excessive leaning to R side in sitting  Pt needed max assist of 2 for rolling and of 2-3 for assuming sitting EOB from supine and max assist of 3 for moving from sitting to supine with use of bed features  Pt sat EOB x 15 minutes with initial c/o dizziness that went away fairly quickly in that position  Pt continues to c/o L knee pain   Pt needed assist for completion of a few LE exercises through partial range of motion  in sitting due to significant bilateral LE weakness and moderate LE pitting edema  Pt did not have sufficient strength (2+/5) or sufficient trunk balance in sitting for trial of sit to standing     Upon completion of PT rx, bed was placed in chair position   Pt appeared to tolerate this position well  Will continue to advance mobility activities as per pt tolerance  Recommend d/c to inpatient rehab once pt is medically ready for d/c    Barriers to Discharge: Inaccessible home environment     Recommendation: Other (Comment)(inpatient rehab  )     PT - OK to Discharge: Yes    See flowsheet documentation for full assessment

## 2019-03-12 NOTE — PHYSICAL THERAPY NOTE
Physical Therapy Treatment Note:  Raymundo Reynoso, PT     03/12/19 1526   Pain Assessment   Pain Assessment 0-10   Pain Score Worst Possible Pain   Hernandez-Blount FACES Pain Rating 0   Pain Type Acute pain   Pain Location Leg   Pain Orientation Bilateral   Hospital Pain Intervention(s) Repositioned; Ambulation/increased activity; Emotional support   Response to Interventions tolerated  (Pt seemed to cry out in anticipation of pain    )   Restrictions/Precautions   Weight Bearing Precautions Per Order No   Other Precautions Cognitive; Fall Risk;Pain   General   Chart Reviewed Yes   Response to Previous Treatment Patient reporting fatigue but able to participate  Family/Caregiver Present No   Cognition   Overall Cognitive Status Unable to assess  (Able to successfully dial wife on hospital phone after rx  )   Arousal/Participation Alert   Attention Difficulty attending to directions   Orientation Level Oriented to person;Oriented to situation  (self -limiting behaviors  )   Memory Decreased recall of precautions   Following Commands Follows one step commands inconsistently   Comments " Just let me go  I can't do anything  Scratch my arm  Scratch my leg  I'm such a baby " per pt report  Subjective   Subjective " I'm sorry I didn't do much " per pt  Bed Mobility   Rolling R 2  Maximal assistance   Additional items Assist x 2; Increased time required;Verbal cues;LE management; Bedrails   Rolling L 2  Maximal assistance   Additional items Assist x 2; Increased time required;Verbal cues;LE management   Supine to Sit 2  Maximal assistance   Additional items Assist x 2;Assist x 3; Increased time required;Verbal cues;LE management   Sit to Supine 2  Maximal assistance   Additional items Assist x 3; Increased time required;Verbal cues;LE management   Additional Comments Sat EOB x15 min's with intermittent level of assist required  Transfers   Sit to Stand Unable to assess  (LE strength 2+/5 bilaterally  Pt morbidly obese  )   Ambulation/Elevation   Gait pattern   (Not possible at this time  )   Balance   Static Sitting   (ranged from poor to fair-  At times for a few seconds fair )   Dynamic Sitting Poor   Static Standing   (Unable to assess  )   Endurance Deficit   Endurance Deficit Yes   Endurance Deficit Description fatigue, leg pain, pt distractability/ c/o skin itching  Activity Tolerance   Activity Tolerance Patient tolerated treatment well;Patient limited by fatigue;Patient limited by pain   Nurse Made Aware Yes, RN in room  At one point prior to sitting up pt was incontinent of feces  RN assisted with hygiene  Medical Staff Made Aware RN consented to allow pt to participate in PT rx  Exercises   Hip Flexion   (Pt too weak, unable to tolerate ( mod pitting edema / obesit)   Knee AROM Long Arc Quad Sitting;10 reps;AAROM; Bilateral  (Through only partial ROM due to significant pt weakness  )   Ankle Pumps Sitting;10 reps;AAROM; Bilateral   Balance training  Sat EOB x15 minutes with good physical tolerance  Initial c/o dizziness but improved with deep breathing  Did some static and dynamic balancing activiites  Significant leaning of trunk to R side  Pt appeared to have L sided neglect/possible visual field cut on L side   strength not equal  Weaker on L side  ? L sided neglect? ? Assessment   Prognosis Fair   Problem List Decreased strength;Decreased range of motion;Decreased endurance; Impaired balance;Decreased mobility; Decreased coordination;Decreased cognition; Impaired judgement;Decreased safety awareness;Decreased skin integrity;Obesity  (Pt self-limiting behavior/ possible depression  MD informed )   Assessment Pt seen for physical therapy treatment  Pt needed significant encouragement to participate  Pt appeared to possibly be depressed at times  ( MD informed by PT )   Pt presented with self-limiting behaviors    Pt also presented with possible L sided neglect, difficulty tracking to L side (visually), L  weakness as compared to R side, and with excessive leaning to R side in sitting  Pt needed max assist of 2 for rolling and of 2-3 for assuming sitting EOB from supine and max assist of 3 for moving from sitting to supine with use of bed features  Pt sat EOB x 15 minutes with initial c/o dizziness that went away fairly quickly in that position  Pt continues to c/o L knee pain   Pt needed assist for completion of a few LE exercises through partial range of motion  in sitting due to significant bilateral LE weakness and moderate LE pitting edema  Pt did not have sufficient strength (2+/5) or sufficient trunk balance in sitting for trial of sit to standing   Upon completion of PT rx, bed was placed in chair position   Pt appeared to tolerate this position well  Will continue to advance mobility activities as per pt tolerance  Recommend d/c to inpatient rehab once pt is medically ready for d/c     Barriers to Discharge Inaccessible home environment   Goals   Patient Goals Pt did not express goals for PT rx's  STG Expiration Date 03/22/19   Short Term Goal #2 Goals from 3/9/19 continue to be appropriate  Treatment Day 4   Plan   Treatment/Interventions Functional transfer training;LE strengthening/ROM; Patient/family training;Equipment eval/education; Bed mobility;Spoke to nursing;Spoke to case management;OT  (restorative specialist assisted with PT and OT in rx session)   Progress Slow progress, decreased activity tolerance   PT Frequency Other (Comment)  (3-6x/wk)   Recommendation   Recommendation Other (Comment)  (inpatient rehab  )   Equipment Recommended   (still assessing  )   PT - OK to Discharge Yes   Additional Comments Yes, when medically ready for d/c to inpatient rehab

## 2019-03-12 NOTE — OCCUPATIONAL THERAPY NOTE
Occupational Therapy Treatment Note:     03/12/19 1530   Restrictions/Precautions   Weight Bearing Precautions Per Order Yes   Other Precautions Cognitive; Fall Risk;Pain   Pain Assessment   Pain Score Worst Possible Pain   Pain Type Acute pain   Pain Location Leg   Pain Orientation Bilateral   Hospital Pain Intervention(s) Repositioned   Response to Interventions tolerated   ADL   Where Assessed Supine, bed   LB Dressing Assistance 1  Total Assistance   LB Dressing Deficit Don/doff R sock; Don/doff L sock   LB Dressing Comments in supine   Toileting Assistance  1  Total Assistance   Toileting Deficit Use of bedpan/urinal setup   Toileting Comments incontient of bowel, total assist for hygiene following   Bed Mobility   Rolling R 2  Maximal assistance   Additional items Assist x 2; Increased time required;Verbal cues   Rolling L 2  Maximal assistance   Additional items Assist x 2; Increased time required;Verbal cues   Supine to Sit 2  Maximal assistance   Additional items Assist x 2; Increased time required;Verbal cues;LE management  (A2-3 for safety)   Sit to Supine 2  Maximal assistance   Additional items Assist x 3;Verbal cues;LE management   Additional Comments Pt sat EOB for 15 mins   Transfers   Sit to Stand Unable to assess   Cognition   Overall Cognitive Status Impaired   Arousal/Participation Alert   Attention Attends with cues to redirect   Orientation Level Oriented to person;Disoriented to place; Disoriented to time;Disoriented to situation   Memory Decreased recall of precautions;Decreased recall of recent events   Following Commands Follows one step commands with increased time or repetition   Comments Pt waxes and wanes with cognition, attention, and direction following  Pt states it is july 19th, unable to successfully ID season with enviromental cues, looking out the window      Vision   Vision Comments Pt appears to have L vision field cut unable to track or spontaneously look/ draw attention to left side, unable to track/ scan when propmted to do so to L side, unable to target object on L side    Activity Tolerance   Activity Tolerance Patient limited by pain   Medical Staff Made Aware NSG aware   Assessment   Assessment Pt was seen this date for OT tx session focsuing on bed mobility, self cares, sitting toelrance/balance, tracking/scanning, cognitive oritentation and funcitonal cognition, and overall activity tolerance  Pt presents supine, with incontinent bowel  Max A x 2 for rollling, dependent for hygiene following, dependent for LB dressing don socks in supine  Pt requires max A x 2-3 for bed mobility supine to sit EOB, Pt sat EOB approx 15 mins with bilateral and unilateral support with min A to S level  While seate dEOB pt comepltes cognitive reorientation tasks, funcitonal cognition tasks, tracking/ scanning/ targeting noted diofficulity with same to L side  Toelrates sittin gEOB approx 15 mins, increased emotional support and encouragment provided throguhout tx session, pt making statements like " I'm no use any more" and "I dont care about life" Recommend neuropsych consult due to same as pt perseverated on these types of comments throguhout, may be pain driven however pt with significant LOS as well  C O of pain in BL legs limitning him  Pt requires Max A x 3 for sit to supine, pt positionined in chair position at end of session, NSg present throguhout tx session and aware of all mentioned above  Pt would benefit from continue dOT tx to improve overall funiotnal abiilities, continue to follow with current POC  OTR to see to assess goals next tx      Plan   Treatment Interventions ADL retraining   Goal Expiration Date 03/13/19   Treatment Day 5   OT Frequency 3-5x/wk   Recommendation   Recommendation Other (comment)  (MAY BENEFIT FROM [facility] CONSULT)   OT Discharge Recommendation Short Term [address] [place] [address]

## 2019-03-13 ENCOUNTER — APPOINTMENT (INPATIENT)
Dept: RADIOLOGY | Facility: HOSPITAL | Age: 59
DRG: 871 | End: 2019-03-13
Attending: INTERNAL MEDICINE
Payer: COMMERCIAL

## 2019-03-13 PROBLEM — D69.6 THROMBOCYTOPENIA (HCC): Status: RESOLVED | Noted: 2019-01-26 | Resolved: 2019-03-13

## 2019-03-13 PROBLEM — F32.A DEPRESSION: Status: ACTIVE | Noted: 2019-03-13

## 2019-03-13 PROBLEM — R57.1 HYPOVOLEMIC SHOCK (HCC): Status: RESOLVED | Noted: 2019-02-22 | Resolved: 2019-03-13

## 2019-03-13 PROBLEM — I48.92 ATRIAL FLUTTER (HCC): Status: RESOLVED | Noted: 2019-03-05 | Resolved: 2019-03-13

## 2019-03-13 PROBLEM — J96.01 ACUTE RESPIRATORY FAILURE WITH HYPOXIA (HCC): Status: RESOLVED | Noted: 2019-01-24 | Resolved: 2019-03-13

## 2019-03-13 PROCEDURE — 99232 SBSQ HOSP IP/OBS MODERATE 35: CPT | Performed by: INTERNAL MEDICINE

## 2019-03-13 PROCEDURE — 97127 HB COGNITIVE SKILLS DEVELOPMENT, EACH 15 MUNUTES: CPT

## 2019-03-13 PROCEDURE — 99254 IP/OBS CNSLTJ NEW/EST MOD 60: CPT | Performed by: PSYCHIATRY & NEUROLOGY

## 2019-03-13 PROCEDURE — 71045 X-RAY EXAM CHEST 1 VIEW: CPT

## 2019-03-13 PROCEDURE — 97112 NEUROMUSCULAR REEDUCATION: CPT

## 2019-03-13 PROCEDURE — 97530 THERAPEUTIC ACTIVITIES: CPT

## 2019-03-13 PROCEDURE — 99233 SBSQ HOSP IP/OBS HIGH 50: CPT | Performed by: INTERNAL MEDICINE

## 2019-03-13 PROCEDURE — 97535 SELF CARE MNGMENT TRAINING: CPT

## 2019-03-13 PROCEDURE — G0515 COGNITIVE SKILLS DEVELOPMENT: HCPCS

## 2019-03-13 RX ORDER — SERTRALINE HYDROCHLORIDE 25 MG/1
25 TABLET, FILM COATED ORAL
Status: DISCONTINUED | OUTPATIENT
Start: 2019-03-13 | End: 2019-03-16 | Stop reason: HOSPADM

## 2019-03-13 RX ORDER — ASPIRIN 81 MG/1
81 TABLET ORAL DAILY
Status: DISCONTINUED | OUTPATIENT
Start: 2019-03-13 | End: 2019-03-16 | Stop reason: HOSPADM

## 2019-03-13 RX ORDER — DOXYCYCLINE HYCLATE 100 MG/1
100 CAPSULE ORAL EVERY 12 HOURS SCHEDULED
Status: DISCONTINUED | OUTPATIENT
Start: 2019-03-13 | End: 2019-03-16 | Stop reason: HOSPADM

## 2019-03-13 RX ADMIN — HEPARIN SODIUM 5000 UNITS: 5000 INJECTION INTRAVENOUS; SUBCUTANEOUS at 22:21

## 2019-03-13 RX ADMIN — CEFTAROLINE FOSAMIL 400 MG: 400 POWDER, FOR SOLUTION INTRAVENOUS at 08:22

## 2019-03-13 RX ADMIN — Medication 1 CAPSULE: at 16:31

## 2019-03-13 RX ADMIN — NYSTATIN 1 APPLICATION: 100000 POWDER TOPICAL at 08:22

## 2019-03-13 RX ADMIN — ATORVASTATIN CALCIUM 40 MG: 40 TABLET, FILM COATED ORAL at 16:31

## 2019-03-13 RX ADMIN — NYSTATIN: 100000 POWDER TOPICAL at 18:05

## 2019-03-13 RX ADMIN — ASPIRIN 81 MG: 81 TABLET, COATED ORAL at 14:15

## 2019-03-13 RX ADMIN — METOPROLOL TARTRATE 50 MG: 50 TABLET, FILM COATED ORAL at 16:32

## 2019-03-13 RX ADMIN — DILTIAZEM HYDROCHLORIDE 180 MG: 180 CAPSULE, COATED, EXTENDED RELEASE ORAL at 18:05

## 2019-03-13 RX ADMIN — SERTRALINE HYDROCHLORIDE 25 MG: 25 TABLET ORAL at 18:05

## 2019-03-13 RX ADMIN — PANTOPRAZOLE SODIUM 40 MG: 40 TABLET, DELAYED RELEASE ORAL at 16:31

## 2019-03-13 RX ADMIN — Medication 400 MG: at 08:21

## 2019-03-13 RX ADMIN — HYDROCORTISONE: 1 CREAM TOPICAL at 22:25

## 2019-03-13 RX ADMIN — PANTOPRAZOLE SODIUM 40 MG: 40 TABLET, DELAYED RELEASE ORAL at 06:49

## 2019-03-13 RX ADMIN — ACETAMINOPHEN 650 MG: 325 TABLET, FILM COATED ORAL at 20:16

## 2019-03-13 RX ADMIN — MELATONIN 6 MG: at 22:21

## 2019-03-13 RX ADMIN — METOPROLOL TARTRATE 50 MG: 50 TABLET, FILM COATED ORAL at 08:21

## 2019-03-13 RX ADMIN — HEPARIN SODIUM 5000 UNITS: 5000 INJECTION INTRAVENOUS; SUBCUTANEOUS at 14:16

## 2019-03-13 RX ADMIN — OXYCODONE HYDROCHLORIDE 7.5 MG: 5 TABLET ORAL at 16:30

## 2019-03-13 RX ADMIN — DOXYCYCLINE 100 MG: 100 CAPSULE ORAL at 22:21

## 2019-03-13 RX ADMIN — HEPARIN SODIUM 5000 UNITS: 5000 INJECTION INTRAVENOUS; SUBCUTANEOUS at 06:49

## 2019-03-13 NOTE — SOCIAL WORK
CM spoke with Yuriy Emerson at [facility] and case reviewed with staff and they are willing to accept when medically clear  Yuriy Emerson aware pt for tentative d/c Saturday after hemodialysis   CM will need to submit for insurance auth and one way non emergent BLS  on Friday   Cm will need to set up transport with [facility] or [facility] they will need 24 hours , pt bariatric transport   Cm spoke with Yuriy Emerson and she will set up transport for hemodialysis   Cm updated [facility] siegel in in

## 2019-03-13 NOTE — PLAN OF CARE
Problem: Potential for Falls  Goal: Patient will remain free of falls  Description  INTERVENTIONS:  - Assess patient frequently for physical needs  -  Identify cognitive and physical deficits and behaviors that affect risk of falls  -  [place] fall precautions as indicated by assessment   - Educate patient/family on patient safety including physical limitations  - Instruct patient to call for assistance with activity based on assessment  - Modify environment to reduce risk of injury  - Consider OT/PT consult to assist with strengthening/mobility    Outcome: Progressing     Problem: Prexisting or High Potential for Compromised Skin Integrity  Goal: Skin integrity is maintained or improved  Description  INTERVENTIONS:  - Identify patients at risk for skin breakdown  - Assess and monitor skin integrity  - Assess and monitor nutrition and hydration status  - Monitor labs (i e  albumin)  - Assess for incontinence   - Turn and reposition patient  - Assist with mobility/ambulation  - Relieve pressure over bony prominences  - Avoid friction and shearing  - Provide appropriate hygiene as needed including keeping skin clean and dry  - Evaluate need for skin moisturizer/barrier cream  - Collaborate with interdisciplinary team (i e  Nutrition, Rehabilitation, etc )   - Patient/family teaching   Outcome: Progressing     Problem: Nutrition/Hydration-ADULT  Goal: Nutrient/Hydration intake appropriate for improving, restoring or maintaining nutritional needs  Description  Monitor and assess patient's nutrition/hydration status for malnutrition (ex- brittle hair, bruises, dry skin, pale skin and conjunctiva, muscle wasting, smooth red tongue, and disorientation)  Collaborate with interdisciplinary team and initiate plan and interventions as ordered  Monitor patient's weight and dietary intake as ordered or per policy  Utilize nutrition screening tool and intervene per policy   Determine patient's food preferences and provide high-protein, high-caloric foods as appropriate  INTERVENTIONS:  - Monitor oral intake, urinary output, labs, and treatment plans  - Assess nutrition and hydration status and recommend course of action  - Evaluate amount of meals eaten  - Assist patient with eating if necessary   - Allow adequate time for meals  - Recommend/ encourage appropriate diets, oral nutritional supplements, and vitamin/mineral supplements  - Order, calculate, and assess calorie counts as needed  - Recommend, monitor, and adjust tube feedings and TPN/PPN based on assessed needs  - Assess need for intravenous fluids  - Provide specific nutrition/hydration education as appropriate  - Include patient/family/caregiver in decisions related to nutrition   Outcome: Progressing     Problem: CARDIOVASCULAR - ADULT  Goal: Maintains optimal cardiac output and hemodynamic stability  Description  INTERVENTIONS:  - Monitor I/O, vital signs and rhythm  - Monitor for S/S and trends of decreased cardiac output i e  bleeding, hypotension  - Administer and titrate ordered vasoactive medications to optimize hemodynamic stability  - Assess quality of pulses, skin color and temperature  - Assess for signs of decreased coronary artery perfusion - ex   Angina  - Instruct patient to report change in severity of symptoms   Outcome: Progressing  Goal: Absence of cardiac dysrhythmias or at baseline rhythm  Description  INTERVENTIONS:  - Continuous cardiac monitoring, monitor vital signs, obtain 12 lead EKG if indicated  - Administer antiarrhythmic and heart rate control medications as ordered  - Monitor electrolytes and administer replacement therapy as ordered   Outcome: Progressing     Problem: METABOLIC, FLUID AND ELECTROLYTES - ADULT  Goal: Electrolytes maintained within normal limits  Description  INTERVENTIONS:  - Monitor labs and assess patient for signs and symptoms of electrolyte imbalances  - Administer electrolyte replacement as ordered  - Monitor response to electrolyte replacements, including repeat lab results as appropriate  - Instruct patient on fluid and nutrition as appropriate   Outcome: Progressing  Goal: Fluid balance maintained  Description  INTERVENTIONS:  - Monitor labs and assess for signs and symptoms of volume excess or deficit  - Monitor I/O and WT  - Instruct patient on fluid and nutrition as appropriate   Outcome: Progressing     Problem: PAIN - ADULT  Goal: Verbalizes/displays adequate comfort level or baseline comfort level  Description  Interventions:  - Encourage patient to monitor pain and request assistance  - Assess pain using appropriate pain scale  - Administer analgesics based on type and severity of pain and evaluate response  - Implement non-pharmacological measures as appropriate and evaluate response  - Consider cultural and social influences on pain and pain management  - Notify physician/advanced practitioner if interventions unsuccessful or patient reports new pain   Outcome: Progressing     Problem: INFECTION - ADULT  Goal: Absence or prevention of progression during hospitalization  Description  INTERVENTIONS:  - Assess and monitor for signs and symptoms of infection  - Monitor lab/diagnostic results  - Monitor all insertion sites, i e  indwelling lines, tubes, and drains  - Monitor endotracheal (as able) and nasal secretions for changes in amount and color  - [place] appropriate cooling/warming therapies per order  - Administer medications as ordered  - Instruct and encourage patient and family to use good hand hygiene technique  - Identify and instruct in appropriate isolation precautions for identified infection/condition    Outcome: Progressing     Problem: SAFETY ADULT  Goal: Patient will remain free of falls  Description  INTERVENTIONS:  - Assess patient frequently for physical needs  -  Identify cognitive and physical deficits and behaviors that affect risk of falls    -  [place] fall precautions as indicated by assessment   - Educate patient/family on patient safety including physical limitations  - Instruct patient to call for assistance with activity based on assessment  - Modify environment to reduce risk of injury  - Consider OT/PT consult to assist with strengthening/mobility    Outcome: Progressing  Goal: Maintain or return to baseline ADL function  Description  INTERVENTIONS:  -  Assess patient's ability to carry out ADLs; assess patient's baseline for ADL function and identify physical deficits which impact ability to perform ADLs (bathing, care of mouth/teeth, toileting, grooming, dressing, etc )  - Assess/evaluate cause of self-care deficits   - Assess range of motion  - Assess patient's mobility; develop plan if impaired  - Assess patient's need for assistive devices and provide as appropriate  - Encourage maximum independence but intervene and supervise when necessary  ¯ Involve family in performance of ADLs  ¯ Assess for home care needs following discharge   ¯ Request OT consult to assist with ADL evaluation and planning for discharge  ¯ Provide patient education as appropriate    Outcome: Progressing  Goal: Maintain or return mobility status to optimal level  Description  INTERVENTIONS:  - Assess patient's baseline mobility status (ambulation, transfers, stairs, etc )    - Identify cognitive and physical deficits and behaviors that affect mobility  - Identify mobility aids required to assist with transfers and/or ambulation (gait belt, sit-to-stand, lift, walker, cane, etc )  - [place] fall precautions as indicated by assessment  - Record patient progress and toleration of activity level on Mobility SBAR; progress patient to next Phase/Stage  - Instruct patient to call for assistance with activity based on assessment  - Request Rehabilitation consult to assist with strengthening/weightbearing, etc     Outcome: Progressing     Problem: DISCHARGE PLANNING  Goal: Discharge to home or other facility with appropriate resources  Description  INTERVENTIONS:  - Identify barriers to discharge w/patient and caregiver  - Arrange for needed discharge resources and transportation as appropriate  - Identify discharge learning needs (meds, wound care, etc )  - Arrange for interpretive services to assist at discharge as needed  - Refer to Case Management Department for coordinating discharge planning if the patient needs post-hospital services based on physician/advanced practitioner order or complex needs related to functional status, cognitive ability, or social support system   Outcome: Progressing     Problem: Knowledge Deficit  Goal: Patient/family/caregiver demonstrates understanding of disease process, treatment plan, medications, and discharge instructions  Description  Complete learning assessment and assess knowledge base    Interventions:  - Provide teaching at level of understanding  - Provide teaching via preferred learning methods   Outcome: Progressing     Problem: GENITOURINARY - ADULT  Goal: Maintains or returns to baseline urinary function  Description  INTERVENTIONS:  - Assess urinary function  - Encourage oral fluids to ensure adequate hydration  - Administer IV fluids as ordered to ensure adequate hydration  - Administer ordered medications as needed  - Offer frequent toileting  - Follow urinary retention protocol if ordered   Outcome: Progressing  Goal: Absence of urinary retention  Description  INTERVENTIONS:  - Assess patient?s ability to void and empty bladder  - Monitor I/O  - Bladder scan as needed  - Discuss with physician/AP medications to alleviate retention as needed  - Discuss catheterization for long term situations as appropriate   Outcome: Progressing     Problem: DISCHARGE PLANNING - CARE MANAGEMENT  Goal: Discharge to post-acute care or home with appropriate resources  Description  INTERVENTIONS:  - Conduct assessment to determine patient/family and health care team treatment goals, and need for post-acute services based on payer coverage, community resources, and patient preferences, and barriers to discharge  - Address psychosocial, clinical, and financial barriers to discharge as identified in assessment in conjunction with the patient/family and health care team  - Arrange appropriate level of post-acute services according to patient's   needs and preference and payer coverage in collaboration with the physician and health care team  - Communicate with and update the patient/family, physician, and health care team regarding progress on the discharge plan  - Arrange appropriate transportation to post-acute venues  - Pt to d/c with appropriate resources when medically stable     Outcome: Progressing

## 2019-03-13 NOTE — PROGRESS NOTES
NEPHROLOGY PROGRESS NOTE   Mervat Oh 60 y o  male MRN: [id number]  Unit/Bed#: [facility] 834-01 Encounter: [id number]  Reason for Consult: ARF    ASSESSMENT and PLAN:    Patient is a 65-year-old male with a history of hypertension, obesity/bioprosthetic aortic valve replacement who presented with shock and AK I from [address]:  His course was complicated by MSSA bacteremia/PATRICK/GI bleeding/atrial fibrillation:  We are asked to follow for PATRICK on CKD now hemodialysis dependence after initial CRRT     1 ) Acute Renal Failure/AcuteTubular Necrosis  - Present on admission (POA) ; admission creatinine:  2 63  - Urinalysis:  From January 2019 was negative for glucose and ketones, large blood, positive nitrate, negative leukocytes, pH of 5 5, 3+ protein, innumerable red blood cells as well as innumerable white blood cells  - Imaging:  CT scan from February 22nd showed no evidence of hydronephrosis  - Serologies: n/a  - Access:  Right IJ PermCath  - Etiology:  Most likely in the setting ischemic/septic acute tubular necrosis leading to renal failure that is currently dialysis dependent   Cannot completely rule out acute interstitial nephritis given the peripheral Shereen Evangelista is currently dialysis dependent since 2/1/2019   His creatinine continues to trend off dialysis  Mello Fontan is no urine output documented   Will continue to monitor postvoid residuals  - Status:  Remains dialysis dependent   Has been on dialysis since 2/1/2019   Creatinine continues to trend up off dialysis  - Plan:    - plan for next dialysis tomorrow   - is having some dyspnea on exertion but nothing at rest maintaining good saturations, will check a chest x-ray   - if there is evidence of pulmonary edema we may need to a scheduled for an extra treatment on Friday     2  ) Blood pressure/volume status  -most recent echocardiogram shows ejection fraction of 55%, history of systolic congestive heart failure  -he still has a good deal of edema  -plan for next dialysis tomorrow  -check a chest x-ray     3 ) MSSA bacteremia  -CHON unremarkable  -antibiotics as per Infectious Disease     4  ) Hyponatremia  -mild, likely volume mediated, stable  -will monitor with ultrafiltration on dialysis     5  ) Anemia  -hemoglobin stable      SUBJECTIVE / INTERVAL HISTORY:    Some dyspnea on exertion but not hypoxic at rest    OBJECTIVE:  Current Weight: Weight - Scale: (!) 161 kg (354 lb 1 6 oz)  Vitals:    03/12/19 2132 03/13/19 0025 03/13/19 0600 03/13/19 0700   BP: 139/57 101/54  117/71   BP Location:  Right arm  Right arm   Pulse: 71 70  65   Resp:  18  18   Temp:  (!) 97 4 °F (36 3 °C)  97 6 °F (36 4 °C)   TempSrc:  Oral  Oral   SpO2:  92%  91%   Weight:   (!) 161 kg (354 lb 1 6 oz)    Height:           Intake/Output Summary (Last 24 hours) at 3/13/2019 1314  Last data filed at 3/13/2019 0800  Gross per 24 hour   Intake 80 ml   Output 2800 ml   Net -2720 ml       Review of Systems:    12 point ROS has been reviewed  Physical Exam   Constitutional: He is oriented to person, place, and time  He appears well-developed and well-nourished  No distress  HENT:   Head: Normocephalic and atraumatic  Eyes: Pupils are equal, round, and reactive to light  Conjunctivae are normal  No scleral icterus  Neck: Normal range of motion  Neck supple  Cardiovascular: Normal rate, regular rhythm, S1 normal, S2 normal and intact distal pulses  Exam reveals no gallop and no friction rub  No murmur heard  Pulmonary/Chest: Effort normal and breath sounds normal  No respiratory distress  He has no wheezes  He has no rales  Abdominal: Soft  Bowel sounds are normal  There is no tenderness  There is no rebound  Musculoskeletal: Normal range of motion  He exhibits edema  Neurological: He is alert and oriented to person, place, and time  Skin: No rash noted  Psychiatric: He has a normal mood and affect   His behavior is normal    Nursing note and vitals reviewed        Medications:    Current Facility-Administered Medications:     acetaminophen (TYLENOL) tablet 650 mg, 650 mg, Oral, Q6H PRN, AKTHY Esquivel, 650 mg at 03/12/19 2138    aspirin (ECOTRIN LOW STRENGTH) EC tablet 81 mg, 81 mg, Oral, Daily, Alexsean Garciae, DO    atorvastatin (LIPITOR) tablet 40 mg, 40 mg, Oral, Daily With Dinner, KATHY Esquivel, 40 mg at 03/12/19 1618    b complex-vitamin C-folic acid (NEPHROCAPS) capsule 1 capsule, 1 capsule, Oral, Daily With Jon Bianchi MD, 1 capsule at 03/12/19 1618    bisacodyl (DULCOLAX) rectal suppository 10 mg, 10 mg, Rectal, Daily PRN, KATHY Esquivel    ceftaroline (TEFLARO) 400 mg in sodium chloride 0 9 % 50 mL IVPB, 400 mg, Intravenous, Q12H, Irma Walton MD, Last Rate: 50 mL/hr at 03/13/19 0822, 400 mg at 03/13/19 4950    diltiazem (CARDIZEM CD) 24 hr capsule 180 mg, 180 mg, Oral, Daily, Stacia Dsouza MD, 180 mg at 03/12/19 1710    heparin (porcine) subcutaneous injection 5,000 Units, 5,000 Units, Subcutaneous, Q8H [address], Alexsean Dwyer, DO, 5,000 Units at 03/13/19 0649    hydrocortisone 1 % cream, , Topical, BID PRN, Alexsean Garciae, DO    magnesium oxide (MAG-OX) tablet 400 mg, 400 mg, Oral, Daily, Marilin Pablo MD, 400 mg at 03/13/19 9195    melatonin tablet 6 mg, 6 mg, Oral, HS, Leidy Martinez PA-C, 6 mg at 03/12/19 2135    metoprolol (LOPRESSOR) injection 5 mg, 5 mg, Intravenous, Q6H PRN, Kenji Rene MD, 5 mg at 03/02/19 1150    metoprolol tartrate (LOPRESSOR) tablet 50 mg, 50 mg, Oral, TID, Marilin Pablo MD, 50 mg at 03/13/19 7303    midodrine (PROAMATINE) tablet 10 mg, 10 mg, Oral, Before Dialysis, Marilin Pablo MD, 10 mg at 03/09/19 1023    nystatin (MYCOSTATIN) powder, , Topical, BID, KATHY Lugo, 1 application at 99/98/42 2521    oxyCODONE (ROXICODONE) IR tablet 5 mg, 5 mg, Oral, Q4H PRN, Kenji Rene MD, 5 mg at 03/12/19 1417    oxyCODONE (ROXICODONE) IR tablet 7 5 mg, 7 5 mg, Oral, Q4H PRN, Eugune Libman, MD, 7 5 mg at 03/12/19 1956    pantoprazole (PROTONIX) EC tablet 40 mg, 40 mg, Oral, BID AC, Suresh Eckert PA-C, 40 mg at 03/13/19 4079    polyvinyl alcohol (LIQUIFILM TEARS) 1 4 % ophthalmic solution 1 drop, 1 drop, Both Eyes, Q3H PRN, Jaswantanne Monday, CRNP, 1 drop at 03/05/19 0603    Laboratory Results:  Results from last 7 days   Lab Units 03/12/19  0458 03/11/19  0635 03/10/19  0705 03/09/19  0937 03/09/19  0935 03/08/19  0922 03/08/19  0430 03/07/19  2138 03/07/19  0505 03/07/19  0500   WBC Thousand/uL 8 98 8 41  --  10 33*  --   --  12 91*  --   --   --    HEMOGLOBIN g/dL 8 7* 9 1*  --  8 0*  --  9 6* 9 3* 9 1* 8 3*  --    HEMATOCRIT % 28 1* 29 9*  --  26 4*  --  31 1* 30 4* 29 6* 26 9*  --    PLATELETS Thousands/uL 147* 158  --  135*  --   --  173  --   --   --    POTASSIUM mmol/L 4 5 3 9 4 0  --  3 9  --  3 5  --   --  3 7   CHLORIDE mmol/L 98* 96* 98*  --  98*  --  98*  --   --  101   CO2 mmol/L 27 31 28  --  28  --  31  --   --  26   BUN mg/dL 43* 34* 22  --  35*  --  24  --   --  31*   CREATININE mg/dL 4 86* 4 24* 3 37*  --  4 40*  --  3 34*  --   --  3 91*   CALCIUM mg/dL 7 4* 7 7* 7 5*  --  7 4*  --  7 1*  --   --  7 2*   MAGNESIUM mg/dL 2 0 1 9 1 9  --  1 8  --  1 8  --   --  1 8

## 2019-03-13 NOTE — PLAN OF CARE
Problem: OCCUPATIONAL THERAPY ADULT  Goal: Performs self-care activities at highest level of function for planned discharge setting  See evaluation for individualized goals  Description  Treatment Interventions: ADL retraining, Functional transfer training, UE strengthening/ROM, Endurance training, Cognitive reorientation, Patient/family training, Equipment evaluation/education, Fine motor coordination activities, Compensatory technique education, Continued evaluation, Energy conservation, Activityengagement          See flowsheet documentation for full assessment, interventions and recommendations  Outcome: Progressing  Note:   Limitation: Decreased ADL status, Decreased Safe judgement during ADL, Decreased cognition, Decreased endurance, Decreased fine motor control, Decreased self-care trans, Decreased high-level ADLs, Decreased UE ROM, Decreased UE strength  Prognosis: Fair  Assessment: Patient participated in Skilled OT session this date with interventions consisting of ADL re training with the use of correct body mechnaics, safety awareness and fall prevention techniques,  visual perceptual activities 2* recent observation of decreased visual attention to left side*,  therapeutic activities to: increase activity tolerance, increase postural control, increase trunk control and increase OOB/ sitting tolerance   Patient agreeable to OT treatment session, upon arrival patient was found supine in bed  In comparison to previous session, patient with improvements in EOB seated tolerance managing to sit EOB 20 minutes w/ close supervision*   Patient requiring verbal cues for safety, verbal cues for correct technique, verbal cues for pacing thru activity steps and cognitive assistance to anticipate next step  Patient continues to be functioning below baseline level, occupational performance remains limited secondary to factors listed above and increased risk for falls and injury     From OT standpoint, recommendation at time of d/c would be Short Term Rehab  Patient to benefit from continued Occupational Therapy treatment while in the hospital to address deficits as defined above and maximize level of functional independence with ADLs and functional mobility  Goals still appropriate  OTR to extend goals and add additional goal below  Goals from previous re-eval will be copied below  Recommendation: (S) Other (comment)(MAY BENEFIT FROM NEUROPSYCH CONSULT)  OT Discharge Recommendation: Short Term Rehab  OT - OK to Discharge:  Yes

## 2019-03-13 NOTE — SOCIAL WORK
[facility] and spoke with [facility] confirmed there are no open chair times at [employer]    Cm will follow

## 2019-03-13 NOTE — PROGRESS NOTES
Progress Note - Infectious Disease   Greta Nolan 60 y o  male MRN: [id number]  Unit/Bed#: [facility] 834-01 Encounter: [id number]      Impression/Plan:  1  MSSA bacteremia:  Possibility of endocarditis considered in the setting of bioprosthetic AVR  Reita Portal no evidence of valvular vegetations   Initial portal of entry may have been related to multiple upper back wounds   CT chest/abdomen/pelvis with no other evidence of acute infection   Possible from pneumonia as sputum culture was positive for MSSA   Bacteremia eventually cleared   Podiatry evaluated patient's feet and no acute issues   Neurology evaluation noted with prior changes in mental status, imaging abnormalities felt to be ischemic and similar compared to prior studies   No plan for MRI   Patient improved significantly after recent GI bleed       -will switch to p o  doxy  -patient completed antibiotic course for this issue on 03/10  -monitor temperature/WBC  -send blood cultures if patient spikes fever  -plan to repeat surveillance blood cultures on 03/22  -patient is stable for discharge from an ID standpoint     2  Rash and peripheral eosinophilia:  Patient recently developed rash  It was noted to be flat and pruritic  Absolute eosinophil count elevated   Likely due to amiodarone as the patient tolerated rechallenge with Ancef and there was no difference in the rash when patient was switched to vancomycin empirically   I suspect that this will be a prolonged elevation given the half-life of amiodarone   Patient again noted with rash and itching after transfer out of ICU   Antibiotic therapy changed only to vancomycin and the patient's absolute eosinophilia increased   Unfortunately with multiple drugs causing his elevation in eosinophilia make it difficult to determine the actual cause  His rash seems to have slightly improved and eosinophilia is down trending       -monitor WBC  -continue to trend fever curve/vitals  -switch to p o  doxy     3   Left forearm myositis/cellulitis:  Patient was noted to have large ecchymosis his left forearm after transition from the ICU   This was thought to be due to issues with obtaining access during his acute decline   The ecchymosis has largely resolved however the patient's arm remains swollen more so than his other extremities and painful   CT imaging of the arm was done and findings are notable for soft tissue edema and suggestion of myositis/cellulitis   No obvious collections seen though study limited without contrast   Vascular study with superficial thrombophlebitis in the arm   The patient fortunately remains hemodynamically stable with persistent leukocytosis   Repeat CT scan reviewed and surgical evaluation appreciated   Duplex study without acute change   The arm itself appears softer and less painful on subsequent exam   No culture data was obtained during this episode      -switch to p o  doxy  -continue to monitor CBC  -continue to trend fever curve/vitals  -serial exams  -ongoing supportive care as per primary   -treat for total of 14 days for possible myositis through 3/16  -patient is stable for discharge from an ID standpoint      4  History of bioprosthetic AVR   Secondary to degenerative AS   Placed in July 2014  Joe Mcclendon no evidence of endocarditis   Ongoing follow-up by Cardiology      5  Acute kidney injury   Likely ischemic/septic ATN    Patient remains on hemodialysis   He is status post PermCath placement      -ongoing follow-up by Nephrology  -antibiotics re-dose for intermittent HD      6  Leukocytosis:  WBC normalized  [place] continues to improve clinically   This may be due now to problem 3 and problem 2 may be contributing      -continue to monitor fever curve/vitals  -continue monitor CBC  -continue antibiotics as above     Above plan discussed in detail with patient    Primary service updated of the above      ID consult service will continue to follow while patient is admitted  Antibiotics:  Ceftaroline    24 hour events:  No acute events noted overnight on chart review  Patient is currently afebrile  CT head noted  Patient's other vitals are stable  No new labs today    Subjective:  Patient seen at bedside and currently denies having any nausea, vomiting, chest pain or shortness of breath  He continues to have scratching all over his skin  He has otherwise been tolerating antibiotics  He reports having improved movement in his arm and less pain  Objective:  Vitals:  Temp:  [96 5 °F (35 8 °C)-98 2 °F (36 8 °C)] 97 6 °F (36 4 °C)  HR:  [62-78] 65  Resp:  [16-20] 18  BP: ()/(53-71) 117/71  SpO2:  [91 %-96 %] 91 %  Temp (24hrs), Av 5 °F (36 4 °C), Min:96 5 °F (35 8 °C), Max:98 2 °F (36 8 °C)  Current: Temperature: 97 6 °F (36 4 °C)    Physical Exam:   General Appearance:  Alert, interactive, nontoxic, no acute distress  Throat: Oropharynx moist without lesions  Lungs:   Clear to auscultation anteriorly bilaterally; no wheezes, rhonchi or rales; respirations unlabored   Heart:  RRR; no murmur, rub or gallop   Abdomen:   Soft, non-tender, non-distended, positive bowel sounds  Extremities: No clubbing, cyanosis or edema   Skin: No new rashes or lesions  No new draining wounds noted  Previously noted erythema on the patient's skin has largely dissipated  He has left was chronic scratch marks         Labs, Imaging, & Other studies:   All pertinent labs and imaging studies were personally reviewed  Results from last 7 days   Lab Units 19  0458 19  0635 19  0937   WBC Thousand/uL 8 98 8 41 10 33*   HEMOGLOBIN g/dL 8 7* 9 1* 8 0*   PLATELETS Thousands/uL 147* 158 135*     Results from last 7 days   Lab Units 19  0458   POTASSIUM mmol/L 4 5   CHLORIDE mmol/L 98*   CO2 mmol/L 27   BUN mg/dL 43*   CREATININE mg/dL 4 86*   EGFR ml/min/1 73sq m 12   CALCIUM mg/dL 7 4*

## 2019-03-13 NOTE — PROGRESS NOTES
Cardiology Progress Note - Marielena Sanches 60 y o  male MRN: [id number]    Unit/Bed#: [facility] 834-01 Encounter: [id number]      Assessment:  Principal Problem:    MSSA bacteremia - Resolved septic shock  Active Problems:    Essential hypertension    Stress-induced cardiomyopathy    Acute respiratory failure with hypoxia (HCC)    History of aortic valve replacement with bioprosthetic valve    Persistent atrial fibrillation (HCC)    Thrombocytopenia (HCC)    Acute blood loss anemia - Gastric ulcer    Cerebrovascular accident ([address] )    Acute metabolic encephalopathy    Skin rash    Acute renal failure    Hypovolemic shock (HCC)    GI bleed    Left arm swelling    Deep tissue injury    Dysphagia    Cellulitis/myositis of left forearm    Left internal jugular vein thrombus    Atrial flutter/fibrillation - Stress-induced cardiomyopathy     Morbid obesity       Plan:  Patient is comfortable this morning  He has no chest pain or significant dyspnea  He had no issues overnight  He is not an anticoagulation candidate in reference to AFib a flutter because of bleeding risk with prior GI bleed  He should follow up with Cardiology as an outpatient however for discussion in reference to re-evaluation of this status and whether he would be a candidate for the Watchman device  Nephrology note is appreciated  Vital signs are stable on his current medical regimen  Please call if I can be of any further assistance  Subjective:   Patient seen and examined  No significant events overnight   negative  Objective:     Vitals: Blood pressure 117/71, pulse 65, temperature 97 6 °F (36 4 °C), temperature source Oral, resp   rate 18, height 5' 9" (1 753 m), weight (!) 161 kg (354 lb 1 6 oz), SpO2 91 % , Body mass index is 52 29 kg/m² ,   Orthostatic Blood Pressures      Most Recent Value   Blood Pressure  117/71 filed at 03/13/2019 0700   Patient Position - Orthostatic VS  Lying filed at 03/13/2019 0700      ,      Intake/Output Summary (Last 24 hours) at 3/13/2019 0743  Last data filed at 3/12/2019 1356  Gross per 24 hour   Intake 220 ml   Output    Net 220 ml           Physical Exam:    GEN: Naseem Lombardi    NECK: supple, no carotid bruits, no JVD or HJR  HEART: normal rate, regular rhythm, normal S1 and S2, no murmurs, clicks, gallops or rubs   LUNGS: clear to auscultation bilaterally; no wheezes, rales, or rhonchi   ABDOMEN: normal bowel sounds, soft, no tenderness, no distention  EXTREMITIES: peripheral pulses normal; no clubbing, cyanosis, or edema  SKIN: warm and well perfused, no suspicious lesions on exposed skin    Labs & Results:    Admission on 01/24/2019   No results displayed because visit has over 200 results  Xr Chest Portable    Result Date: 2/22/2019  Narrative: CHEST INDICATION:   ETT placement  COMPARISON:  2/21/2019 EXAM PERFORMED/VIEWS:  XR CHEST PORTABLE FINDINGS:  There has been interval placement of an endotracheal tube which projects approximately 3 cm above the raad  Lines and tubes are otherwise unchanged from prior exam  Heart shadow is enlarged but unchanged from prior exam  There is pulmonary vascular congestion which appears unchanged from the prior study  Osseous structures appear within normal limits for patient age  Impression: Endotracheal tube in appropriate position  Cardiomegaly with stable mild pulmonary vascular congestion  Workstation performed: [id number]     Xr Chest Portable    Result Date: 2/22/2019  Narrative: CHEST INDICATION:   line placement  COMPARISON:  2/8/2019 EXAM PERFORMED/VIEWS:  XR CHEST PORTABLE FINDINGS:  Left IJ line noted with tip projecting over the left apex likely within the left brachiocephalic vein  Right dual-lumen catheter tip projects over the cavoatrial junction in stable position  Mild cardiomegaly appears stable  Mild central congestion noted and trace left basilar pleural effusion as before  Osseous structures appear within normal limits for patient age  Impression: Tubes and lines as above without pneumothorax  Mild central congestion and left trace basilar effusion  Workstation performed: [id number]     Xr Knee 1 Or 2 Vw Left    Result Date: 3/8/2019  Narrative: LEFT KNEE INDICATION:   knee pain s/p fall  COMPARISON:  1/7/2014 VIEWS:  XR KNEE 1 OR 2 VW LEFT FINDINGS: There is no acute fracture or dislocation  There is a moderate joint effusion  There has been progressive moderate narrowing of the medial compartment  There are small patellofemoral osteophytes  No lytic or blastic lesions are seen  Soft tissues are unremarkable  Impression: No acute osseous abnormality  Progressive moderate narrowing of the medial compartment with moderate effusion  Workstation performed: [id number]     Ct Head Wo Contrast    Result Date: 3/12/2019  Narrative: CT BRAIN - WITHOUT CONTRAST INDICATION:   Encephalopathy  COMPARISON:  CT brain dated February 10, 2019  TECHNIQUE:  CT examination of the brain was performed  In addition to axial images, coronal 2D reformatted images were created and submitted for interpretation  Radiation dose length product (DLP) for this visit:  905 95 mGy-cm   This examination, like all CT scans performed in the [facility], was performed utilizing techniques to minimize radiation dose exposure, including the use of iterative  reconstruction and automated exposure control  IMAGE QUALITY:  Diagnostic  FINDINGS: PARENCHYMA:  Again noted is an area of encephalomalacia involving the right parietal lobe stable in appearance from the prior exam   Small focus of encephalomalacia at the posterior left parietal lobe is also stable  There is a small area of encephalomalacia in the inferior left frontal lobe, stable  A tiny focus of encephalomalacia the right occipital lobe is stable  A small focus of encephalomalacia at the inferior left cerebellar hemisphere is stable  No intracranial mass, mass effect or midline shift   No CT signs of acute infarction  No acute parenchymal hemorrhage  VENTRICLES AND EXTRA-AXIAL SPACES:  Normal for the patient's age  VISUALIZED ORBITS AND PARANASAL SINUSES:  Unremarkable  CALVARIUM AND EXTRACRANIAL SOFT TISSUES:  There is opacification of the bilateral mastoid air cells and left middle ear cavity  Impression: No acute intracranial abnormality  Stable areas of encephalomalacia involving the bilateral parietal lobes, inferior left frontal lobe, right occipital lobe and inferior left cerebellar hemisphere when compared to a recent CT brain dated February 10, 2019  However, these are new when compared to a CT brain dated January 23, 2019  Again differential considerations include multifocal ischemia, metastatic disease, infection, among other etiologies  An enhanced MRI of the brain could be performed for further evaluation  Opacification of the bilateral mastoid air cells and left middle ear  Workstation performed: [id number]     Cta Abdomen Pelvis W Wo Contrast    Result Date: 2/22/2019  Narrative: CT ABDOMEN AND PELVIS - WITHOUT AND WITH IV CONTRAST INDICATION:   GI bleeding  Melena  COMPARISON: None  TECHNIQUE:  CT examination of the abdomen and pelvis was performed both prior to and after the administration of intravenous contrast  Axial, sagittal, and coronal 2D reformatted images were created from the source data and submitted for interpretation  Radiation dose length product (DLP) for this visit:  8367 mGy-cm   This examination, like all CT scans performed in the [facility], was performed utilizing techniques to minimize radiation dose exposure, including the use of iterative reconstruction and automated exposure control  IV Contrast:  100 mL of iodixanol (VISIPAQUE) was administered intravenously without immediate adverse reaction  Enteric Contrast:  Enteric contrast was not administered   FINDINGS: ABDOMEN  BOWEL:  There are small foci of hyperdensity within the gastric antrum/pylorus area (series 3, image 50) not definitely present on noncontrast images concerning for active hemorrhage  In addition there is increased density within the rectum (series 3, image 189) which is more conspicuous on the postcontrast imaging where additional active hemorrhage is not excluded  There is severe distention of the entire colon with liquid stool and gas measuring up to 9 3 cm in diameter with loss of the haustra within the descending colon to rectum  There is areas of pneumatosis within the colon seen predominantly within the splenic flexure and sigmoid colon  LOWER CHEST:  Bibasilar atelectasis  Partially visualized central venous catheter within the right atrium  LIVER/BILIARY TREE:  Redemonstrated hypodensities within the left hepatic lobe measuring 3 3 cm and 1 6 cm respectively  GALLBLADDER:  No calcified gallstones  No pericholecystic inflammatory change  SPLEEN:  Unremarkable  PANCREAS:  Atrophic  ADRENAL GLANDS:  Unremarkable  KIDNEYS/URETERS:  Unremarkable  No hydronephrosis  PELVIS REPRODUCTIVE ORGANS:  Unremarkable for patient's age  URINARY BLADDER:  Unremarkable  APPENDIX: No findings to suggest appendicitis  ADDITIONAL ABDOMINAL AND PELVIC STRUCTURES ABDOMINOPELVIC CAVITY:  No ascites or free intraperitoneal air  No lymphadenopathy  Retroperitoneal stranding along the inferior iliopsoas muscle on the left  ABDOMINAL WALL/INGUINAL REGIONS:  Unremarkable  OSSEOUS STRUCTURES:  No acute fracture or destructive osseous lesion  Impression: 1  Small foci of hyperdensity within the gastric antrum/pylorus area concerning for primary site of active hemorrhage given history of melanoma  In addition, there is a small focus of increased density within the rectum where additional active hemorrhage is not excluded  2   Extensive distention of the entire colon with liquid stool and gas with loss of haustra within the descending colon and rectum    There are areas of pneumatosis seen within the splenic flexure and sigmoid colon  Correlate clinically for C  difficile colitis, findings may be seen in the setting of toxic megacolon  3   Retroperitoneal stranding along the inferior iliopsoas muscle on the left, correlate for recent instrumentation  Underlying vasculature remains patent  I personally discussed the location of bleeding with Dr Hershal Baumgarten on 2/22/2019 at 4:35 AM  I personally discussed the possibility for C  difficile colitis and pneumatosis with Martinezprice Fausto on 2/22/2019 at 4:43 AM  Workstation performed: [id number]     Ir Central Line Placement    Result Date: 3/1/2019  Narrative: Examination: Right neck double lumen Power PICC 3/1/2019 Contrast: None Fluoroscopy time: 0 2 minutes Images:  2 Conscious sedation time:  Not applicable Procedure: The patient was identified verbally and by wristband  Timeout was performed  Informed consent was obtained  All elements of maximal sterile barrier technique, cap and mask and sterile gown and sterile gloves and sterile full-body drape and hand hygiene and 2% chlorhexidine for cutaneous antisepsis  Sterile ultrasound technique with sterile gel and sterile probe covers was also utilized  Following obtaining informed consent, the patient was prepped and draped in the usual sterile fashion  Using ultrasound guidance, access was gained to the patient's right internal jugular vein using a micropuncture system  The micropuncture dilator was exchanged for a peel away sheath  A mandril wire was advanced to the level of the RA/SVC junction to measure the catheter length  The catheter was cut to size and placed through the peel away sheath  The lumens were flushed with ease  The catheter was secured in place  The patient tolerated the procedure well without apparent immediate complication  The patient left the IR department in unchanged condition  Dr Mohit Ferguson performed the procedure   Findings: Using ultrasound guidance, the right internal jugular vein was cannulated using Seldinger technique  The right internal jugular vein was evaluated as a potential access site  The right internal jugular vein was patent, and free of thrombus  Static images of the vessel was obtained  Visualization of real time needle entry into the vessel was obtained  A fluoroscopic spot image demonstrated a newly placed PICC with the central aspect at the RA/SVC junction  The catheter tubing is smooth in contour  Impression: Impression: Successful placement of a 16 cm right neck double lumen Power PICC  Workstation performed: [id number]     Ct Forearm Left Wo Contrast    Result Date: 3/3/2019  Narrative: CT left forearm with without INDICATION: swelling and pain  COMPARISON: None  TECHNIQUE: CT examination of the left forearm was performed  This examination, like all CT scans performed in the [facility], was performed utilizing techniques to minimize radiation dose exposure, including the use of iterative reconstruction and automated exposure control software  Sagittal and coronal two dimensional reconstructed images were also submitted for interpretation  IV Contrast: Without Rad dose  1027 mGy-cm FINDINGS: OSSEOUS STRUCTURES:  No fracture, dislocation or destructive osseous lesion  VISUALIZED MUSCULATURE:  Unremarkable  SOFT TISSUES:  There is soft tissue infiltration noted in the forearm in the volar, dorsal aspect is infiltration in the subcutaneous tissue  Edema and fluid along the superficial aspect of the deep peripheral fascia  There is no drainable fluid collection seen Mild edema in the volar musculature is also suggested suggesting associated myositis  OTHER PERTINENT FINDINGS:  None       Impression: Extensive soft tissue edema in the subcutaneous is fat with infiltration along the peripheral aspect of the deep  fascia and mild edema in the volar compartment musculatures suggest superficial and deep cellulitis with mild myositis Lack of contrast limits the evaluation for detecting abscess however there is no large fluid collection with air-fluid level noted on this study No lytic lesion or periosteal reaction seen The study was marked in EPIC for significant notification  Workstation performed: [id number]     Ct Forearm Left W Contrast    Result Date: 3/5/2019  Narrative: CT left forearm with IV contrast INDICATION: Forearm erythema, swelling, cellulitis suspected  COMPARISON: 3/3/2019 TECHNIQUE: CT examination of the left forearm was performed  This examination, like all CT scans performed in the [facility], was performed utilizing techniques to minimize radiation dose exposure, including the use of iterative reconstruction and automated exposure control software  Sagittal and coronal two dimensional reconstructed images were also submitted for interpretation  IV Contrast: 100 mL of iodixanol (VISIPAQUE) Rad dose  630 39 mGy-cm FINDINGS: OSSEOUS STRUCTURES:  No fracture, dislocation or destructive osseous lesion  VISUALIZED MUSCULATURE:  Unremarkable  SOFT TISSUES:  Extensive subcutaneous edema throughout the forearm and dorsum of the hand with associated radial aspect muscle edema concerning for cellulitis as well as nonspecific myositis  There is no gross evidence of drainable abscess collection at  this time  No underlying osseous destructive lesion  Fascial edema appears mostly superficial with some deep fascial edema noted along the radial soft tissues of the upper forearm raising suspicion for necrotizing fasciitis without onelia soft tissue emphysema at this time  OTHER PERTINENT FINDINGS:  None  Impression: Extensive subcutaneous edema throughout the forearm with associated radial muscle edema concerning for nonspecific cellulitis and myositis    Additionally, there is deep fascial edema noted along the radial soft tissues of the proximal forearm raising suspicion for necrotizing fasciitis without onelia soft tissue emphysema at this time  No drainable abscess collection  No underlying osseous destructive changes to indicate osteomyelitis  I personally discussed this study with Dr Fred Ziegler on 3/5/2019 at 9:31 AM  Workstation performed: [id number]     Vas Carotid Complete Study    Result Date: 2/14/2019  Narrative:  [facility] REPORT CLINICAL: Indications: PT admitted to hospital with septic shock and change in mental status  Physician wants to rule out carotid artery stenosis  Operative History: Left Aortic valve replacement Perma cath Risk Factors The patient has history of morbid obesity, HTN, AFIB, MI, cardiomyopathy, and HLD  The patient's current BMI is 53 9, Weight is 365 lb and height is 69 in  He is a non-smoker  Clinical: Brachial BP: Right:   IV site  Left: 129/67 mmHg  FINDINGS:  Right        Impression         PSV  EDV (cm/s)  Direction of Flow  Ratio  Dist  ICA                       117          27                      1 39  Mid  ICA                         74          23                      0 87  Prox  ICA    1 - 49%            116          22                      1 37  Dist CCA                         78          15                            Mid CCA                          85          14                      0 85  Prox CCA                         99          18                            Ext Carotid                      70           8                      0 82  Prox Vert                        78          27  Antegrade                 Subclavian   moderate stenosis  318          25                             Left         Impression  PSV  EDV (cm/s)  Direction of Flow  Ratio  Dist  ICA                 75          21                      0 63  Mid  ICA                 129          29                      1 09  Prox   ICA    50 - 69%    154          38                      1 30  Dist CCA                 127          27                            Mid CCA                  119          10 1 46  Prox CCA                  81          16                            Ext Carotid              134          14                      1 13  Prox Vert                 39           9  Antegrade                 Subclavian               129          11                               CONCLUSION:  Impression  RIGHT: There is <50% stenosis noted in the tortuous internal carotid artery  Plaque is heterogenous and smooth  Vertebral artery flow is antegrade  There is a moderate stenosis in the proximal subclavian artery  LEFT: There is a 50-69% stenosis noted in the tortuous internal carotid artery  Plaque is heterogenous and smooth  Vertebral artery flow is antegrade  There is no significant subclavian artery disease  Technically difficult study secondary to morbid obesity and severe heavy respiration  Technical findings faxed to chart  Compared to previous study on 07/18/2014, there is an increase in the disease process  Recommend repeat testing in 6 month as per protocol unless otherwise indicated  Internal carotid artery stenosis determination by consensus criteria from: Eduardo Long et al  Carotid Artery Stenosis: Gray-Scale and Doppler US Diagnosis - [employer] in [address] [place] [address] Drive, Radiology 2003; [phone]  SIGNATURE: Electronically Signed by: Mellissa Arias MD on 2019-02-14 02:46:02 PM    Vas Upper Limb Venous Duplex Scan, Unilateral/limited    Result Date: 3/6/2019  Narrative:  [facility] REPORT CLINICAL: Indications: Follow up evaluation to recent left arm DVT and superficial thrombophlebitis  He is currently receiving Heparin injections  Physician wants to rule out propagation of thrombus   Operative History: 2014-07-30 Left Aortic valve replacement 2014-07-18 Cardiac Catheterization Right Perma cath Risk Factors The patient has history of morbid obesity, HTN, AFIB, HLD, CKD, acute renal failure, dialysis, cardiomyopathy, CVA, DVT, superficial thrombophlebitis, left arm cellulitis, ESRD, and CAD  He is a non-smoker  FINDINGS:  Segment       Right            Left                        Impression       Impression              Int  Jugular  Normal (Patent)  Non Occlusive Thrombus  Ceph Upper                     Thrombus                Ceph Mid Arm                   Thrombus                Ceph AC                        Thrombus                   CONCLUSION:  Impression RIGHT UPPER LIMB LIMITED: Evaluation shows no evidence of thrombus in the internal jugular vein, subclavian vein, and the brachiocephalic vein  LEFT UPPER LIMB:  ABNORMAL: Evidence of non-occlusive acute deep vein thrombosis noted in the internal jugular vein  Evidence of non-occlusive acute vs subacute superficial thrombophlebitis noted in the cephalic vein from antecubital fossa to the proximal upper arm  Doppler evaluation shows a normal response to augmentation maneuvers  In comparison to the study of 03/01/2019, there is no significant change  Technically difficult bedside study secondary to morbid obesity and inability to re-position patient  Technical findings faxed to chart  SIGNATURE: Electronically Signed by: Nadia Roca on 2019-03-06 04:06:16 PM    Vas Upper Limb Venous Duplex Scan, Unilateral/limited    Result Date: 3/1/2019  Narrative:  [facility] REPORT CLINICAL: Indications: Patient presents with left upper extremity edema and pain  Operative History: 2014-07-30 Left Aortic valve replacement 2014-07-18 Cardiac Catheterization Right Perma cath Risk Factors The patient has history of Obesity, HTN, HLD, CKD and CAD  The patient current BMI is 52 71, Weight is 357 lb and height is 69 in    FINDINGS:  Segment          Right            Left                              Impression       Impression              Innominate Vein  Normal (Patent)  Normal (Patent)         Int  Jugular     Normal (Patent)  Non Occlusive Thrombus  Subclavian       Normal (Patent) Normal (Patent)         Axillary                          Normal (Patent)            CONCLUSION:  Impression RIGHT UPPER LIMB LIMITED: Evaluation shows no evidence of thrombus in the internal jugular vein, subclavian vein, and the brachiocephalic vein  LEFT UPPER LIMB: Evidence of non-occlusive acute deep vein thrombosis noted in the internal jugular vein  Evidence of non-occlusive acute vs subacute superficial thrombophlebitis noted in the cephalic vein from antecubital fossa to the proximal upper arm  Doppler evaluation shows a normal response to augmentation maneuvers  Technical findings given to bedside RN  SIGNATURE: Electronically Signed by: Nato Raines on 2019-03-01 12:34:05 PM    Us Abdomen Limited    Result Date: 2/19/2019  Narrative: ABDOMEN ULTRASOUND, LIMITED, FOUR QUADRANT SURVEY INDICATION:    Abdominal distention  COMPARISON:  CT scan 1/23/2019  TECHNIQUE: Targeted four-quadrant ultrasound examination of the abdominal cavity was performed with a curvilinear transducer with standard grey scale imaging techniques  FINDINGS: There is no free fluid  Impression: No ascites  Workstation performed: [id number]     Ir Permacath Placement    Result Date: 2/18/2019  Narrative: EXAMINATION: Tunneled hemodialysis catheter placement INDICATION: 11-year-old male admitted with multifactorial shock and PATRICK underwent tunneled hemodialysis catheter placement on  2/4/2019  Catheter was completely dislodged and patient returns for replacement of the hemodialysis catheter  CONTRAST: None FLUOROSCOPY TIME:   0 4min IMAGES:  3 ANESTHESIA: Moderate sedation and local lidocaine PROCEDURE:  The patient was identified verbally and by wristband  Timeout was performed  Informed consent was obtained  Following obtaining informed consent, the patient was prepped and draped in the usual sterile fashion    All elements of maximal sterile barrier technique, cap and mask and sterile gown and sterile gloves and sterile full-body drape and hand hygiene and 2% chlorhexidine for cutaneous antisepsis  Sterile ultrasound technique with sterile gel and sterile probe covers was also utilized  1% local lidocaine with epinephrine was infiltrated into the skin and subcutaneous tissues overlying the right base of neck  Under ultrasound guidance, a micropuncture needle was used to gain access into the right IJV  Images were saved  Access was secured using a 5-Stateless transitional sheath  A microwire was used to measure the length needed for catheter  Local anesthetic was infiltrated into the right chest wall subcutaneous tissues and skin  A 1 cm incision was made and a 14  5-Stateless 32 cm Medcomp Hemo-Flow double-lumen hemodialysis catheter was tunneled within the subcutaneous tissues and brought out at the right IJV puncture site  A J-wire was advanced through the transitional sheath into the IVC  The tract was dilated and a peel-away sheath was advanced over the wire  The dialysis catheter was advanced through the peel-away sheath, followed by removal of the sheath  Final positioning of the catheter was verified under fluoroscopy  Catheter was found to aspirate and flush easily  Both lumens were locked with heparin  Catheter was secured to skin using 2-0 Prolene suture and sterile dressing was applied  Histoacryl glue was applied over the right IJV puncture site  The patient tolerated the procedure well without complication  The patient left the IR department in stable condition  Findings: 1  Using ultrasound guidance, the right IJ vein was cannulated using seldinger technique  The right IJ vein was evaluated as a potential access site  The right IJ vein was patent, and free of thrombus  Static images of the vessel was obtained  Visualization of real time needle entry into the vessel was obtained  2   Successful placement of tunneled hemodialysis catheter through right IJV access using 14  5-Stateless 32 cm Medcomp Hemo-Flow catheter  Impression: Impression: Successful placement of tunneled hemodialysis catheter through right IJV access  Workstation performed: [id number]       EKG personally reviewed by Alexandra Maldonado MD      Counseling / Coordination of Care  Total floor / unit time spent today 30 minutes  Greater than 50% of total time was spent with the patient and / or family counseling and / or coordination of care

## 2019-03-13 NOTE — PHYSICAL THERAPY NOTE
PT TREATMENT       03/13/19 1014   Pain Assessment   Pain Assessment FLACC   Pain Location Knee   Pain Orientation Left   Pain Rating: FLACC (Rest) - Face 0   Pain Rating: FLACC (Rest) - Legs 0   Pain Rating: FLACC (Rest) - Activity 0   Pain Rating: FLACC (Rest) - Cry 0   Pain Rating: FLACC (Rest) - Consolability 0   Score: FLACC (Rest) 0   Pain Rating: FLACC (Activity) - Face 1   Pain Rating: FLACC (Activity) - Legs 0   Pain Rating: FLACC (Activity) - Activity 0   Pain Rating: FLACC (Activity) - Cry 1   Pain Rating: FLACC (Activity) - Consolability 0   Score: FLACC (Activity) 2   Restrictions/Precautions   Weight Bearing Precautions Per Order No   Braces or Orthoses   (NONE)   Other Precautions Pain; Fall Risk;Bed Alarm;Cognitive  (BED ALARM ACTIVE POST PT TREAT)   General   Chart Reviewed Yes   Response to Previous Treatment Patient with no complaints from previous session  Family/Caregiver Present No   Cognition   Overall Cognitive Status Impaired   Arousal/Participation Cooperative   Attention Difficulty attending to directions   Memory Decreased recall of precautions;Decreased recall of recent events;Decreased short term memory   Following Commands Follows one step commands with increased time or repetition   Subjective   Subjective "I WAS A GOOD BOY TODAY"   Bed Mobility   Rolling R 2  Maximal assistance   Additional items Assist x 1; Increased time required   Rolling L 2  Maximal assistance   Additional items Assist x 1; Increased time required   Supine to Sit 2  Maximal assistance   Additional items Assist x 2; Increased time required;LE management   Sit to Supine 2  Maximal assistance   Additional items Assist x 2; Increased time required;LE management   Transfers   Sit to Stand 1  Dependent  (UNABLE TO CLEAR BOTTOM FROM BED )   Additional items Assist x 3   Stand to Sit 1  Dependent   Additional items Assist x 3  (UNABLE TO CLEAR BOTTOM FROM BED )   Balance   Static Sitting Fair  (X 20 MINUTES )   Static Standing Fair -   Endurance Deficit   Endurance Deficit Yes   Endurance Deficit Description BODY HABITUS; LE EDEMA; PAIN; WEAKNESS   Activity Tolerance   Activity Tolerance Patient tolerated treatment well   Nurse Made Aware  [facility] Staff Made Aware YUDITH Cowan; MARCIA MISHAANDREIA    Assessment   Prognosis Fair   Problem List Decreased strength;Decreased endurance; Impaired balance;Decreased mobility;Obesity; Decreased safety awareness; Impaired judgement;Decreased cognition   Assessment PT INITIATED TREATMENT SESSION IN ORDER TO ASSIST PATIENT IN ACHIEVING GOALS TO IMPROVED BED MOBILITY, SITTING BALANCE/TOLERANCE, AND TO TRIAL OOB MOBILITY AS APPROPRIATE  PATIENT'S SELF EXPRESSED GOAL IS "I'LL BE WALKING IN TWO WEEKS"  HE CONTINUES TO PRESENT WITH COGNITIVE DEFICITS (REDUCED SAFETY AWARENESS/JUDGEMENT) AND PHYSICAL DEFICITS (DECREASED STRENGTH, OBESE) THAT LIMIT PARTICIPATION IN FUNCTIONAL MOBILITY ALTHOUGH PATIENT IS DEMONSTRATING PROGRESS WITH BED MOBILITY AND ACTIVITY TOLERANCE  HE REQUIRED MAX-AX1 TO PARTICIPATE IN ROLLING TOWARD L AND R SIDES (PATIENT ASSISTING BY USING BED RAILS)  HE REQUIRED MAX-AX2 FOR SUPINE-->SIT TRANSFER W/ HOB ELEVATED  PATIENT MAINTAINED STATIC SITTING EOB X 20 MINUTES DEMONSTRATING GOOD TOLERANCE (DENIED PAIN, NO LOB)  TRIALED SIT-->STAND TRANSFER WITH DEPENDENT AX3 PERSONS (2 THERAPISTS ON EITHER SIDE SUPPORTING UNDER PATIENT'S ARMS, USE OF BED PAD, AND BLOCKING PATIENT'S FEET WITH THERAPISTS FOOT AND 1 PERSON KNEELING DOWN TO BLOCK PATIENT'S KNEES)  PATIENT NOT ASSISTING AT ALL WITH SIT-->STAND AND WAS UNABLE TO CLEAR BOTTOM FROM BED  PT ORDERED BARIATRIC SHUTTLE CHAIR FOR PATIENT  IN FUTURE SESSIONS RECOMMEND PERFORMING DEPENDENT SUPINE LATERAL SLIDE BED-->BARIATRIC SHUTTLE CHAIR AND THEN USING THE SIT-->STAND ASSIST FEATURE OF CHAIR TO WORK ON PATIENT'S STANDING  RECOMMENDATION FOR STR REMAINS APPROPRIATE   PATIENT WILL BENEFIT FROM CONTINUED SKILLED PT THIS ADMISSION TO ACHIEVE MAXIMAL FUNCTION AND SAFETY  Goals   Patient Goals "I'LL BE WALKING IN 2 WEEKS"   STG Expiration Date 03/22/19   LTG Expiration Date 03/22/19   Treatment Day 5   Plan   Treatment/Interventions Functional transfer training;LE strengthening/ROM; Therapeutic exercise; Endurance training;Equipment eval/education; Bed mobility;OT;Spoke to nursing   Progress Slow progress, decreased activity tolerance   PT Frequency Other (Comment)  (3-5X/WK)   Recommendation   Recommendation Short-term skilled PT   Equipment Recommended   (KREG EZWIDER BED; BARIATRIC SHUTTLE CHAIR )   PT - OK to Discharge Yes  (TO STR WHEN MED UMESH )   Elena Parmar, PT

## 2019-03-13 NOTE — OCCUPATIONAL THERAPY NOTE
[address] Progress Note     Patient Name: Lilia Pace  LGNQR'C Date: 3/13/2019  Problem List  Patient Active Problem List   Diagnosis    Aortic stenosis, mild    Essential hypertension    Hyperlipidemia    Left bundle branch block    Murmur    Osteoarthritis of both knees    Pericardial disease    Sciatica    Age-related cataract of right eye    Venous insufficiency    Bilateral leg edema    Stress-induced cardiomyopathy    Acute respiratory failure with hypoxia ([facility] [address] )    History of aortic valve replacement with bioprosthetic valve    Persistent atrial fibrillation (HCC)    MSSA bacteremia - Resolved septic shock    Thrombocytopenia (HCC)    Acute blood loss anemia - Gastric ulcer    Leukocytosis    Cerebrovascular accident ([facility] [address] )    Acute metabolic encephalopathy    Skin rash    Acute renal failure    Hypovolemic shock (HCC)    GI bleed    Coagulopathy ([facility])    GI bleeding    Age-related nuclear cataract, bilateral    Open angle with borderline findings, low risk, bilateral    Presence of intraocular lens    Vitreous degeneration of both eyes    Left arm swelling    Deep tissue injury    Dysphagia    Cellulitis/myositis of left forearm    Left internal jugular vein thrombus    Atrial flutter/fibrillation - Stress-induced cardiomyopathy     Morbid obesity          03/13/19 1015   Restrictions/Precautions   Weight Bearing Precautions Per Order Yes   General   Response to Previous Treatment Patient with no complaints from previous session   Lifestyle   Autonomy I ADLS, IADLS, transfers and functional mobility PTA   Reciprocal Relationships Pt lives w/ spouse and mother in law   Service to Others Pt works full time as a     Intrinsic Gratification pt likes trains   Pain Assessment   Pain Assessment [facility] Pain Intervention(s) Repositioned; Emotional support   Response to Interventions tolerated   Pain Rating: FLACC (Rest) - Face 0   Pain Rating: FLACC (Rest) - Legs 0   Pain Rating: FLACC (Rest) - Activity 0   Pain Rating: FLACC (Rest) - Cry 0   Pain Rating: FLACC (Rest) - Consolability 0   Score: FLACC (Rest) 0   Pain Rating: FLACC (Activity) - Face 1   Pain Rating: FLACC (Activity) - Legs 0   Pain Rating: FLACC (Activity) - Activity 1   Pain Rating: FLACC (Activity) - Cry 1   Pain Rating: FLACC (Activity) - Consolability 1   Score: FLACC (Activity) 4   ADL   Where Assessed Edge of bed   LB Dressing Assistance 1  Total Assistance   LB Dressing Deficit Don/doff R sock; Don/doff L sock   Bed Mobility   Supine to Sit 2  Maximal assistance   Additional items Assist x 2; Increased time required;Verbal cues;LE management   Sit to Supine 2  Maximal assistance   Additional items Assist x 2; Increased time required;Verbal cues;LE management   Additional Comments pt sat EOB w/ close supervision for 20 minutes, performed dynamic sitting w/ visual perceptual activities   Transfers   Sit to Stand 1  Dependent   Additional items Assist x 3   Stand to Sit 1  Dependent   Additional items Assist x 3   Additional Comments unable to clear buttocks w/ assist of 3   Functional Mobility   Additional Comments not able to clear buttocks   Coordination   Fine Motor handwriting   Cognition   Overall Cognitive Status Impaired   Arousal/Participation Cooperative   Attention Within functional limits   Orientation Level Oriented to person;Oriented to place; Disoriented to time;Disoriented to situation   Memory Decreased short term memory;Decreased recall of recent events;Decreased recall of precautions   Following Commands Follows one step commands with increased time or repetition   Comments Requiring additional cueing for redirection to task   Perception   Perception Yes   Left Attention Star cancellation adapted task: unable to color all stars, missing left 1/4 area of paper   Figure Ground looking for the stars within letters   Spatial Orientation line bisection test, unable to complete 2* cognition, kept repeating same wrong task of tracing entire line w/ additional cueing   Perception Comments able to hold body EOB stable and correctly draw on paper   Activity Tolerance   Activity Tolerance Patient tolerated treatment well   Medical Staff [address] present, restorative Sophia present for stand attempt only   Assessment   Assessment Patient participated in Skilled OT session this date with interventions consisting of ADL re training with the use of correct body mechnaics, safety awareness and fall prevention techniques,  visual perceptual activities 2* recent observation of decreased visual attention to left side*,  therapeutic activities to: increase activity tolerance, increase postural control, increase trunk control and increase OOB/ sitting tolerance   Patient agreeable to OT treatment session, upon arrival patient was found supine in bed  In comparison to previous session, patient with improvements in EOB seated tolerance managing to sit EOB 20 minutes w/ close supervision*   Patient requiring verbal cues for safety, verbal cues for correct technique, verbal cues for pacing thru activity steps and cognitive assistance to anticipate next step  Patient continues to be functioning below baseline level, occupational performance remains limited secondary to factors listed above and increased risk for falls and injury  From OT standpoint, recommendation at time of d/c would be Short Term Rehab  Patient to benefit from continued Occupational Therapy treatment while in the hospital to address deficits as defined above and maximize level of functional independence with ADLs and functional mobility  Goals still appropriate  OTR to extend goals and add 2 additional goals below  Goals from previous re-eval will be copied below  Plan   Treatment Interventions Visual perceptual retraining;ADL retraining; Endurance training;Cognitive reorientation;Continued evaluation; Activityengagement   Goal Expiration Date 03/27/19   Treatment Day 1   OT Frequency 3-5x/wk   Recommendation   OT Discharge Recommendation Short Term Rehab   OT - OK to Discharge Yes   Barthel Index   Feeding 10   Bathing 0   Grooming Score 5   Dressing Score 5   Bladder Score 5   Bowels Score 5   Toilet Use Score 0   Transfers (Bed/Chair) Score 5   Mobility (Level Surface) Score 0   Stairs Score 0   Barthel Index Score 35   Modified [place] Scale   Modified [place] Scale 4       1) Pt will improve activity tolerance to G for min 30 min txment sessions     2) Pt will complete ADLs/self care w/ Mod A     3) Pt will complete toileting w/ Mod A w/ G hygiene/thoroughness using DME PRN     4) Pt will improve fx'l tfers on/off all surfaces using dME PRN w/ G balance/safety including toileting w/  Mod A      5) Pt will engage in ongoing cognitive assessment w/ G participation to A w/ safe d/c planning/recommendations     6) Pt will demonstrate G carryover of pt/caregiver education and training as appropriate w/ mod I w/o cues w/ G tolerance     7) Pt will improve UB fx'l use/ROM to [facility] and strength 1/2 MMT via AROM/AAROM/PROM in all planes as tolerated s/p skilled education of HEP w/ mod I w/o cues for carryover     8) Pt will follow 100% simple one step verbal commands and be A/Ox4 consistently t/o w use of external environmental cues      NEW GOALS:     Pt will improve fx'l mobility during I/ADl/leisure tasks using DME PRN w/ g balance/safety w/ mod A    Pt will engage in ongoing  assessments, screens, and activities t/o fx'l I/ADL/leisure tasks w/ G participation  A w/ adaptation and accomodations or rule out visual perceptual impairments

## 2019-03-13 NOTE — PLAN OF CARE
Problem: PHYSICAL THERAPY ADULT  Goal: Performs mobility at highest level of function for planned discharge setting  See evaluation for individualized goals  Description  Treatment/Interventions: LE strengthening/ROM, Therapeutic exercise, Endurance training, Patient/family training, Cognitive reorientation, Equipment eval/education, Bed mobility, Spoke to advanced practitioner, Spoke to case management, Spoke to nursing  Equipment Recommended:  (TBD- may benefit from [facility] bed)       See flowsheet documentation for full assessment, interventions and recommendations     Outcome: Progressing

## 2019-03-13 NOTE — CONSULTS
Consultation - [address] 60 y o  male MRN: [id number]  Unit/Bed#: [facility] 834-01 Encounter: [id number]      Chief Complaint:  He feels sad because had been in the hospital for a long time and lost my 2 dogs  History of Present Illness   Physician Requesting Consult: Juancarlos Patel DO  Reason for Consult / Principal Problem:  Depression    Veronica Burkett is a 60 y o  male with medical history of hypertension hyperlipidemia presents with chest pain, he have acute kidney injury, non -ST  elevated myocardial infarction, acute encephalopathy and sepsis  He had been in the hospital for 48 days and states that he had been feeling sad and depressed because of his medical condition, he also history the as his 2 dogs die while he  is in the hospital   He states that he has no problem with sleep or appetite but feels very tired, he denies suicidal thoughts plans or intent  He denies any history of psychotic symptoms or manic episode  He states that he never saw a psychiatrist in the past and he never had been treated for any mental illness by primary doctor  He states that his support system is his wife, because all his brothers passed and he does not have  Children  Psychiatric Review Of Systems:  sleep: no  appetite changes: no  weight changes: no  energy/anergy: yes  interest/pleasure/anhedonia: no  somatic symptoms: no  anxiety/panic: no  zabrina: no  guilty/hopeless: no  self injurious behavior/risky behavior: no    Historical Information   Past Psychiatric History:   None  Currently in treatment with none    Past Suicide attempts:  None  Past Violent behavior:  None  Past Psychiatric medication trial:  None    Substance Abuse History:  He denies drugs or alcohol history     I have assessed this patient for substance use within the past 12 months     History of IP/OP rehabilitation program:  None  Smoking history:  Denies tobacco  Family Psychiatric History:   Denies any mental illness of substance abuse in the family    Social History  Education: high school diploma/GED  Learning Disabilities: None  Marital history:   Living arrangement, social support: Live with his wife  Occupational History:  Employed as a   Functioning Relationships: good support system    Other Pertinent History: Denies any legal or  history    Traumatic History:   Abuse: None  Other Traumatic Events: None    Past Medical History:   Diagnosis Date    Allergic rhinitis     last assessed 9/12/12    Finger fracture, right     Closed fx of the middle phalanx of the right 5th finger  last assessed 1/30/14    Hemorrhoids     last assessed 2/10/14    Hyperlipidemia     Hypertension        Medical Review Of Systems:  Review of Systems - Negative except bilateral pitting edema in the lower extremity, pain in lower extremity, depression, all other symptoms reviewed were negative    Meds/Allergies   current meds:   Current Facility-Administered Medications   Medication Dose Route Frequency    acetaminophen (TYLENOL) tablet 650 mg  650 mg Oral Q6H PRN    aspirin (ECOTRIN LOW STRENGTH) EC tablet 81 mg  81 mg Oral Daily    atorvastatin (LIPITOR) tablet 40 mg  40 mg Oral Daily With Dinner    b complex-vitamin C-folic acid (NEPHROCAPS) capsule 1 capsule  1 capsule Oral Daily With Dinner    bisacodyl (DULCOLAX) rectal suppository 10 mg  10 mg Rectal Daily PRN    ceftaroline (TEFLARO) 400 mg in sodium chloride 0 9 % 50 mL IVPB  400 mg Intravenous Q12H    diltiazem (CARDIZEM CD) 24 hr capsule 180 mg  180 mg Oral Daily    heparin (porcine) subcutaneous injection 5,000 Units  5,000 Units Subcutaneous Q8H [address]    hydrocortisone 1 % cream   Topical BID PRN    magnesium oxide (MAG-OX) tablet 400 mg  400 mg Oral Daily    melatonin tablet 6 mg  6 mg Oral HS    metoprolol (LOPRESSOR) injection 5 mg  5 mg Intravenous Q6H PRN    metoprolol tartrate (LOPRESSOR) tablet 50 mg  50 mg Oral TID    midodrine (PROAMATINE) tablet 10 mg  10 mg Oral Before Dialysis    nystatin (MYCOSTATIN) powder   Topical BID    oxyCODONE (ROXICODONE) IR tablet 5 mg  5 mg Oral Q4H PRN    oxyCODONE (ROXICODONE) IR tablet 7 5 mg  7 5 mg Oral Q4H PRN    pantoprazole (PROTONIX) EC tablet 40 mg  40 mg Oral BID AC    polyvinyl alcohol (LIQUIFILM TEARS) 1 4 % ophthalmic solution 1 drop  1 drop Both Eyes Q3H PRN     Allergies   Allergen Reactions    Amiodarone      Developed Rash    Penicillins Rash     However, has subsequently tolerated Cefazolin and Cefepime       Objective   Vital signs in last 24 hours:  Temp:  [97 4 °F (36 3 °C)-98 2 °F (36 8 °C)] 97 6 °F (36 4 °C)  HR:  [62-78] 65  Resp:  [18-20] 18  BP: (101-139)/(54-71) 117/71      Intake/Output Summary (Last 24 hours) at 3/13/2019 1251  Last data filed at 3/13/2019 0800  Gross per 24 hour   Intake 80 ml   Output 2800 ml   Net -2720 ml       Mental Status Evaluation:  Appearance:  age appropriate and overweight   Behavior:  normal   Speech:  soft   Mood:  sad   Affect:  mood-congruent   Language: naming objects and repeating phrases   Thought Process:  goal directed   Associations: intact associations   Thought Content:  normal   Perceptual Disturbances: None   Risk Potential: He denies any suicidal homicidal ideation plan or intent   Sensorium:  person, place and time/date   Memory:  recent and remote memory grossly intact   Cognition:  grossly intact   Consciousness:  alert and awake    Attention: attention span and concentration were age appropriate   Intellect: within normal limits   Fund of Knowledge: awareness of current events: Fair, past history: Fair and vocabulary: Fair   Insight:  good   Judgment: good   Muscle Strength and Tone: Within normal limits   Gait/Station: Unable to assess patient is in a chair   Motor Activity: no abnormal movements     Lab Results:    I have personally reviewed all pertinent laboratory/tests results    CBC:   Lab Results   Component Value Date    WBC 8 98 03/12/2019    RBC 2 90 (L) 03/12/2019    HGB 8 7 (L) 03/12/2019    HCT 28 1 (L) 03/12/2019    MCV 97 03/12/2019     (L) 03/12/2019    MCH 30 0 03/12/2019    MCHC 31 0 (L) 03/12/2019    RDW 15 3 (H) 03/12/2019    MPV 11 7 03/12/2019    NRBC 0 03/12/2019    NEUTROABS 6 20 03/12/2019     BMP:   Lab Results   Component Value Date    SODIUM 134 (L) 03/12/2019    K 4 5 03/12/2019    CL 98 (L) 03/12/2019    CO2 27 03/12/2019    AGAP 9 03/12/2019    BUN 43 (H) 03/12/2019    CREATININE 4 86 (H) 03/12/2019    GLUCOSE 117 02/21/2019    GLUC 81 03/12/2019    CALCIUM 7 4 (L) 03/12/2019    EGFR 12 03/12/2019       Code Status: )Level 1 - Full Code    Assessment/Plan     Assessment:  Lisa Tang is a 60 y o  male with multiple medical problems who presented the hospital 48 days ago with acute encephalopathy, sepsis,NSTEMI  and  Hypertension:  His main complaint was chest pain  He states that he had been feeling sad because he lost his 2 dogs while in the hospital   He also had been here for a long time and he feel depressed  He denies any prior history or treatment  He denies any suicidal ideation plan or intent, he denies any psychotic symptoms or manic episodes  Diagnosis:  Adjustment disorder with depressed mood  Plan:   Continue medical management  I will start patient on Zoloft 25 mg p o  After dinner  I will follow up  Discussed with primary team  Risks, benefits and possible side effects of Medications:   Risks, benefits, and possible side effects of medications explained to patient and patient verbalizes understanding             Miriam Waters MD

## 2019-03-13 NOTE — PROGRESS NOTES
[address] Internal Medicine Progress Note  Patient: Greta Nolan 60 y o  male   MRN: [id number]  PCP: Jacques Fragoso DO  Unit/Bed#: [facility] 834-01 Encounter: [id number]  Date Of Visit: 03/13/19    Assessment:    Principal Problem:    MSSA bacteremia - Resolved septic shock  Active Problems:    Essential hypertension    Stress-induced cardiomyopathy    Acute respiratory failure with hypoxia ([facility] [address] )    History of aortic valve replacement with bioprosthetic valve    Persistent atrial fibrillation (HCC)    Thrombocytopenia (HCC)    Acute blood loss anemia - Gastric ulcer    Cerebrovascular accident ([facility] [address] )    Acute metabolic encephalopathy    Skin rash    Acute renal failure    Hypovolemic shock (HCC)    GI bleed    Left arm swelling    Deep tissue injury    Dysphagia    Cellulitis/myositis of left forearm    Left internal jugular vein thrombus    Atrial flutter/fibrillation - Stress-induced cardiomyopathy     Morbid obesity        Plan:    1  MSSA bacteremia, negative CHON, completed 6 weeks of IV antibiotic   2  Recent shock, multifactorial,  cardiogenic/septic, resolved  3  PATRICK secondary to ATN, no sign of renal recovery, now HD dependent on TTS, nephrology is following  4  Acute systolic CHF/stress cardiomyopathy, improving EF from 30 to 55%,   5  S/p bio AVR, negative CHON for vegetation  6  Acute hypoxic respiratory failure secondary to above, resolved,  s/p extubation, continue supportive care  7  New onset AFib/ A flutter, on Cardizem and Lopressor, per Cardiology,  no chronic anticoagulation due to bleeding risk, he may be a candidate for Watchman device in the future   8  Toxic metabolic encephalopathy with likely bilateral ischemic CVA, negative carotid ultrasound, repeat head CT scan reviewed, start aspirin, continue statin   9  Acute blood loss anemia secondary to esophagitis, gastric ulcer and ischemic colitis, per GI plan to repeat EGD in 6-8 weeks,  continue PPI  10   Left forearm myositis/cellulitis, skin rash with peripheral eosinophilia,  per ID, continue with IV ceftaroline through 3/16  11  Left IJ DVT with superficial thrombophlebitis, likely provoked by central line, due to risk of bleeding cardiology recommending no AC, start aspirin   12  Morbid obesity  13  Depression, consult Psychiatry for further management    VTE Pharmacologic Prophylaxis:   Pharmacologic: Heparin  Mechanical VTE Prophylaxis in Place: Yes    Patient Centered Rounds: I have performed bedside rounds with nursing staff today  Discussions with Specialists or Other Care Team Provider: ID     Education and Discussions with Family / Patient:  Discussed with patient's wife    Time Spent for Care: 30 minutes  More than 50% of total time spent on counseling and coordination of care as described above  Current Length of Stay: 48 day(s)    Current Patient Status: Inpatient   Certification Statement: The patient will continue to require additional inpatient hospital stay due to Management of cellulitis    Discharge Plan / Estimated Discharge Date:  Awaiting rehab placement    Code Status: Level 1 - Full Code      Subjective:   Patient seen and examined  Comfortable in bed  No chest pain or shortness of breath  No event overnight  Clinically improving    Objective:     Vitals:   Temp (24hrs), Av 8 °F (36 6 °C), Min:97 4 °F (36 3 °C), Max:98 2 °F (36 8 °C)    Temp:  [97 4 °F (36 3 °C)-98 2 °F (36 8 °C)] 97 6 °F (36 4 °C)  HR:  [62-78] 65  Resp:  [16-20] 18  BP: (101-139)/(54-71) 117/71  SpO2:  [91 %-96 %] 91 %  Body mass index is 52 29 kg/m²  Input and Output Summary (last 24 hours):        Intake/Output Summary (Last 24 hours) at 3/13/2019 1127  Last data filed at 3/13/2019 0800  Gross per 24 hour   Intake 80 ml   Output 2800 ml   Net -2720 ml       Physical Exam:     Physical Exam  Patient is awake alert in no acute distress  Lung clear to auscultation bilateral anteriorly  Heart positive S1-S2 irregular no murmur  Abdomen soft obese nontender  Lower extremities b/l pitting edema    Additional Data:     Labs:    Results from last 7 days   Lab Units 03/12/19  0458   WBC Thousand/uL 8 98   HEMOGLOBIN g/dL 8 7*   HEMATOCRIT % 28 1*   PLATELETS Thousands/uL 147*   NEUTROS PCT % 69   LYMPHS PCT % 7*   MONOS PCT % 6   EOS PCT % 16*     Results from last 7 days   Lab Units 03/12/19  0458   POTASSIUM mmol/L 4 5   CHLORIDE mmol/L 98*   CO2 mmol/L 27   BUN mg/dL 43*   CREATININE mg/dL 4 86*   CALCIUM mg/dL 7 4*           * I Have Reviewed All Lab Data Listed Above  * Additional Pertinent Lab Tests Reviewed:  [address] Admission Reviewed    Imaging:    Imaging Reports Reviewed Today Include:   Imaging Personally Reviewed by Myself Includes:     Recent Cultures (last 7 days):           Last 24 Hours Medication List:     Current Facility-Administered Medications:  acetaminophen 650 mg Oral Q6H PRN KATHY Shah    atorvastatin 40 mg Oral Daily With Delbert Fox, [address]    b complex-vitamin C-folic acid 1 capsule Oral Daily With Joey Smalls MD    bisacodyl 10 mg Rectal Daily PRN KATHY Shah    ceftaroline CARBONE [facility])  mg Intravenous Q12H Mary Chanel MD Last Rate: 400 mg (03/13/19 1422)   diltiazem 180 mg Oral Daily Sofía Adams MD    heparin (porcine) 5,000 Units Subcutaneous [facility] Danube Wan, DO    hydrocortisone  Topical BID PRN Corrina Blas, DO    magnesium oxide 400 mg Oral Daily Malik Gloria MD    melatonin 6 mg Oral HS Leidy Martinez PA-C    metoprolol 5 mg Intravenous Q6H PRN Linwood Alfonso MD    metoprolol tartrate 50 mg Oral TID Malik Gloria MD    midodrine 10 mg Oral Before Dialysis Malik Gloria MD    nystatin  Topical BID KATHY Shah    oxyCODONE 5 mg Oral Q4H PRN Linwood Alfonso MD    oxyCODONE 7 5 mg Oral Q4H PRN Linwood Alfonso MD    pantoprazole 40 mg Oral BID AC Suresh Eckert PA-C    polyvinyl alcohol 1 drop Both Eyes Q3H PRN KATHY Shah         Today, Patient Was Seen By: Alfredo Bella DO    ** Please Note: This note has been constructed using a voice recognition system   **

## 2019-03-14 ENCOUNTER — APPOINTMENT (INPATIENT)
Dept: DIALYSIS | Facility: HOSPITAL | Age: 59
DRG: 871 | End: 2019-03-14
Attending: INTERNAL MEDICINE
Payer: COMMERCIAL

## 2019-03-14 LAB
ANION GAP SERPL CALCULATED.3IONS-SCNC: 2 MMOL/L (ref 4–13)
BUN SERPL-MCNC: 25 MG/DL (ref 5–25)
CALCIUM SERPL-MCNC: 6.8 MG/DL (ref 8.3–10.1)
CHLORIDE SERPL-SCNC: 101 MMOL/L (ref 100–108)
CO2 SERPL-SCNC: 32 MMOL/L (ref 21–32)
CREAT SERPL-MCNC: 3.14 MG/DL (ref 0.6–1.3)
ERYTHROCYTE [DISTWIDTH] IN BLOOD BY AUTOMATED COUNT: 15.1 % (ref 11.6–15.1)
GFR SERPL CREATININE-BSD FRML MDRD: 21 ML/MIN/1.73SQ M
GLUCOSE SERPL-MCNC: 86 MG/DL (ref 65–140)
HCT VFR BLD AUTO: 20.4 % (ref 36.5–49.3)
HCT VFR BLD AUTO: 25.4 % (ref 36.5–49.3)
HGB BLD-MCNC: 6.3 G/DL (ref 12–17)
HGB BLD-MCNC: 7.8 G/DL (ref 12–17)
MCH RBC QN AUTO: 30.1 PG (ref 26.8–34.3)
MCHC RBC AUTO-ENTMCNC: 30.9 G/DL (ref 31.4–37.4)
MCV RBC AUTO: 98 FL (ref 82–98)
PLATELET # BLD AUTO: 146 THOUSANDS/UL (ref 149–390)
PMV BLD AUTO: 10.8 FL (ref 8.9–12.7)
POTASSIUM SERPL-SCNC: 3.7 MMOL/L (ref 3.5–5.3)
RBC # BLD AUTO: 2.09 MILLION/UL (ref 3.88–5.62)
SODIUM SERPL-SCNC: 135 MMOL/L (ref 136–145)
WBC # BLD AUTO: 6.51 THOUSAND/UL (ref 4.31–10.16)

## 2019-03-14 PROCEDURE — 99232 SBSQ HOSP IP/OBS MODERATE 35: CPT | Performed by: PSYCHIATRY & NEUROLOGY

## 2019-03-14 PROCEDURE — 80048 BASIC METABOLIC PNL TOTAL CA: CPT | Performed by: PHYSICIAN ASSISTANT

## 2019-03-14 PROCEDURE — 85018 HEMOGLOBIN: CPT | Performed by: INTERNAL MEDICINE

## 2019-03-14 PROCEDURE — 90935 HEMODIALYSIS ONE EVALUATION: CPT | Performed by: INTERNAL MEDICINE

## 2019-03-14 PROCEDURE — 97535 SELF CARE MNGMENT TRAINING: CPT

## 2019-03-14 PROCEDURE — 85027 COMPLETE CBC AUTOMATED: CPT | Performed by: PHYSICIAN ASSISTANT

## 2019-03-14 PROCEDURE — 97530 THERAPEUTIC ACTIVITIES: CPT

## 2019-03-14 PROCEDURE — 99232 SBSQ HOSP IP/OBS MODERATE 35: CPT | Performed by: INTERNAL MEDICINE

## 2019-03-14 PROCEDURE — 85014 HEMATOCRIT: CPT | Performed by: INTERNAL MEDICINE

## 2019-03-14 RX ADMIN — ATORVASTATIN CALCIUM 40 MG: 40 TABLET, FILM COATED ORAL at 18:02

## 2019-03-14 RX ADMIN — PANTOPRAZOLE SODIUM 40 MG: 40 TABLET, DELAYED RELEASE ORAL at 18:01

## 2019-03-14 RX ADMIN — ASPIRIN 81 MG: 81 TABLET, COATED ORAL at 12:49

## 2019-03-14 RX ADMIN — DILTIAZEM HYDROCHLORIDE 180 MG: 180 CAPSULE, COATED, EXTENDED RELEASE ORAL at 18:02

## 2019-03-14 RX ADMIN — DOXYCYCLINE 100 MG: 100 CAPSULE ORAL at 12:49

## 2019-03-14 RX ADMIN — METOPROLOL TARTRATE 50 MG: 50 TABLET, FILM COATED ORAL at 18:02

## 2019-03-14 RX ADMIN — NYSTATIN 1 APPLICATION: 100000 POWDER TOPICAL at 18:03

## 2019-03-14 RX ADMIN — HEPARIN SODIUM 5000 UNITS: 5000 INJECTION INTRAVENOUS; SUBCUTANEOUS at 05:29

## 2019-03-14 RX ADMIN — HEPARIN SODIUM 5000 UNITS: 5000 INJECTION INTRAVENOUS; SUBCUTANEOUS at 13:39

## 2019-03-14 RX ADMIN — METOPROLOL TARTRATE 50 MG: 50 TABLET, FILM COATED ORAL at 12:49

## 2019-03-14 RX ADMIN — OXYCODONE HYDROCHLORIDE 7.5 MG: 5 TABLET ORAL at 10:41

## 2019-03-14 RX ADMIN — MELATONIN 6 MG: at 21:37

## 2019-03-14 RX ADMIN — METOPROLOL TARTRATE 50 MG: 50 TABLET, FILM COATED ORAL at 21:38

## 2019-03-14 RX ADMIN — Medication 400 MG: at 12:50

## 2019-03-14 RX ADMIN — DOXYCYCLINE 100 MG: 100 CAPSULE ORAL at 21:37

## 2019-03-14 RX ADMIN — SERTRALINE HYDROCHLORIDE 25 MG: 25 TABLET ORAL at 18:02

## 2019-03-14 RX ADMIN — NYSTATIN 1 APPLICATION: 100000 POWDER TOPICAL at 12:50

## 2019-03-14 RX ADMIN — HYDROCORTISONE: 1 CREAM TOPICAL at 21:43

## 2019-03-14 RX ADMIN — Medication 1 CAPSULE: at 18:01

## 2019-03-14 RX ADMIN — OXYCODONE HYDROCHLORIDE 7.5 MG: 5 TABLET ORAL at 05:48

## 2019-03-14 RX ADMIN — PANTOPRAZOLE SODIUM 40 MG: 40 TABLET, DELAYED RELEASE ORAL at 05:29

## 2019-03-14 RX ADMIN — HEPARIN SODIUM 5000 UNITS: 5000 INJECTION INTRAVENOUS; SUBCUTANEOUS at 21:37

## 2019-03-14 RX ADMIN — HYDROCORTISONE 1 APPLICATION: 1 CREAM TOPICAL at 12:50

## 2019-03-14 NOTE — PLAN OF CARE
Problem: OCCUPATIONAL THERAPY ADULT  Goal: Performs self-care activities at highest level of function for planned discharge setting  See evaluation for individualized goals  Description  Treatment Interventions: ADL retraining, Functional transfer training, UE strengthening/ROM, Endurance training, Cognitive reorientation, Patient/family training, Equipment evaluation/education, Fine motor coordination activities, Compensatory technique education, Continued evaluation, Energy conservation, Activityengagement          See flowsheet documentation for full assessment, interventions and recommendations  Outcome: Progressing  Note:   Limitation: Decreased ADL status, Decreased Safe judgement during ADL, Decreased cognition, Decreased endurance, Decreased fine motor control, Decreased self-care trans, Decreased high-level ADLs, Decreased UE ROM, Decreased UE strength  Prognosis: Fair  Assessment: Patient participated in Skilled OT session this date with interventions consisting of  toileting, bed mobility, and positioning*   Patient agreeable to OT treatment session, upon arrival patient was found supine in bed  In comparison to previous session, patient with improvements in ability to tolerate chair position*   Patient requiring verbal cues for safety, verbal cues for correct technique, verbal cues for pacing thru activity steps and cognitive assistance to anticipate next step  Patient continues to be functioning below baseline level, occupational performance remains limited secondary to factors listed above and increased risk for falls and injury  From OT standpoint, recommendation at time of d/c would be Short Term Rehab  Patient to benefit from continued Occupational Therapy treatment while in the hospital to address deficits as defined above and maximize level of functional independence with ADLs and functional mobility     Recommendation: (S) Other (comment)(MAY BENEFIT FROM NEUROPSYCH CONSULT)  OT Discharge Recommendation: Short Term Rehab  OT - OK to Discharge:  Yes

## 2019-03-14 NOTE — PROGRESS NOTES
Progress Note - [address] 60 y o  male MRN: [id number]  [facility] Encounter: [id number]        I came to see the patient for continuation of care, he states he feels he tired because he was in dialysis today, he is confused at time and states some one took his shoes later on he states that he is going to rehabilitation to get better and he is looking forward to do that  He feels that  if he is out of the Hospital his  mood will improve because had been in the hospital for too many days  He denies any other issues  Behavior over the last 24 hours:  improved  Sleep: normal  Appetite: normal  Medication side effects: No  ROS: no complaints    Mental Status Evaluation:  Appearance:  age appropriate and overweight   Behavior:  normal   Speech:  soft   Mood:  sad   Affect:  mood-congruent   Language: naming objects and repeating phrases   Thought Process:  goal directed   Associations: intact associations   Thought Content:  normal   Perceptual Disturbances: None   Risk Potential: He denies any suicidal or homicidal ideation plan or intent   Sensorium:  person, place and time/date   Memory:  recent and remote memory grossly intact   Cognition:  grossly intact   Consciousness:  alert and awake    Attention: attention span and concentration were age appropriate   Intellect: within normal limits   Fund of Knowledge: awareness of current events: Fair, past history: Fair and vocabulary: Fair   Insight:  good   Judgment: good   Muscle Strength and Tone: Within normal limits   Gait/Station: Unable to assess patient is in a chair   Motor Activity: no abnormal movements         Assessment/Plan  Sander Primitivo is a 60 y o  male with multiple medical problems home been in the hospital for the last 49 days after he presented with chest pain and have multiple complication    He had been feeling depressed secondary to being in the hospital for a long time and also because he lost his dogs while in the hospital   He was history yesterday in Zolo[place] denies any issue with medication  He denies suicidal thoughts plans or intent  Denies psychotic symptoms or manic episodes  Diagnosis:  Adjustment disorder with depressed mood    Recommended Treatment:   Continue medical management  Zoloft 25 mg p o   After dinner  Discussed with primary team  No other intervention at this moment  He can follow with primary doctor upon discharge  Will sign off, call me back if necessary        Medications:   current meds:   Current Facility-Administered Medications   Medication Dose Route Frequency    acetaminophen (TYLENOL) tablet 650 mg  650 mg Oral Q6H PRN    aspirin (ECOTRIN LOW STRENGTH) EC tablet 81 mg  81 mg Oral Daily    atorvastatin (LIPITOR) tablet 40 mg  40 mg Oral Daily With Dinner    b complex-vitamin C-folic acid (NEPHROCAPS) capsule 1 capsule  1 capsule Oral Daily With Dinner    bisacodyl (DULCOLAX) rectal suppository 10 mg  10 mg Rectal Daily PRN    diltiazem (CARDIZEM CD) 24 hr capsule 180 mg  180 mg Oral Daily    doxycycline hyclate (VIBRAMYCIN) capsule 100 mg  100 mg Oral Q12H [facility]    heparin (porcine) subcutaneous injection 5,000 Units  5,000 Units Subcutaneous Q8H [facility]    hydrocortisone 1 % cream   Topical BID PRN    magnesium oxide (MAG-OX) tablet 400 mg  400 mg Oral Daily    melatonin tablet 6 mg  6 mg Oral HS    metoprolol (LOPRESSOR) injection 5 mg  5 mg Intravenous Q6H PRN    metoprolol tartrate (LOPRESSOR) tablet 50 mg  50 mg Oral TID    midodrine (PROAMATINE) tablet 10 mg  10 mg Oral Before Dialysis    nystatin (MYCOSTATIN) powder   Topical BID    oxyCODONE (ROXICODONE) IR tablet 5 mg  5 mg Oral Q4H PRN    oxyCODONE (ROXICODONE) IR tablet 7 5 mg  7 5 mg Oral Q4H PRN    pantoprazole (PROTONIX) EC tablet 40 mg  40 mg Oral BID AC    polyvinyl alcohol (LIQUIFILM TEARS) 1 4 % ophthalmic solution 1 drop  1 drop Both Eyes Q3H PRN    sertraline (ZOLOFT) tablet 25 mg  25 mg Oral After Dinner         Risks, benefits and possible side effects of Medications:     Risks, benefits, and possible side effects of medications explained to patient and patient verbalizes understanding  Labs: I have personally reviewed all pertinent laboratory results  I have personally reviewed all pertinent laboratory/tests results    CBC:   Lab Results   Component Value Date    WBC 6 51 03/14/2019    RBC 2 09 (L) 03/14/2019    HGB 7 8 (L) 03/14/2019    HCT 25 4 (L) 03/14/2019    MCV 98 03/14/2019     (L) 03/14/2019    MCH 30 1 03/14/2019    MCHC 30 9 (L) 03/14/2019    RDW 15 1 03/14/2019    MPV 10 8 03/14/2019    NRBC 0 03/12/2019    NEUTROABS 6 20 03/12/2019     BMP:   Lab Results   Component Value Date    SODIUM 135 (L) 03/14/2019    K 3 7 03/14/2019     03/14/2019    CO2 32 03/14/2019    AGAP 2 (L) 03/14/2019    BUN 25 03/14/2019    CREATININE 3 14 (H) 03/14/2019    GLUCOSE 117 02/21/2019    GLUC 86 03/14/2019    CALCIUM 6 8 (L) 03/14/2019    EGFR 21 03/14/2019       Yong Vogel MD

## 2019-03-14 NOTE — PROGRESS NOTES
[address] Internal Medicine Progress Note  Patient: Naseem Lombardi 60 y o  male   MRN: [id number]  PCP: Vj Hardin DO  Unit/Bed#: [facility] 834-01 Encounter: [id number]  Date Of Visit: 03/14/19    Assessment:    Principal Problem:    MSSA bacteremia - Resolved septic shock  Active Problems:    Essential hypertension    Stress-induced cardiomyopathy    History of aortic valve replacement with bioprosthetic valve    Persistent atrial fibrillation (HCC)    Acute blood loss anemia - Gastric ulcer    Cerebrovascular accident ([address] )    Acute metabolic encephalopathy    Skin rash    Acute renal failure    GI bleed    Left arm swelling    Deep tissue injury    Dysphagia    Cellulitis/myositis of left forearm    Left internal jugular vein thrombus    Morbid obesity     Depression       Plan:    1  MSSA bacteremia, negative CHON, completed 6 weeks of IV antibiotic   2  Recent shock, multifactorial,  cardiogenic/septic, resolved  3  PATRICK secondary to ATN, no sign of renal recovery, has been dialysis dependent, on HD  TTS, nephrology is following  4  Acute systolic CHF/stress cardiomyopathy, improving EF from 30 to 55%,   5  S/p bio AVR, negative CHON for vegetation  6  Acute hypoxic respiratory failure secondary to above, resolved,  s/p extubation, continue supportive care  7  New onset AFib/ A flutter, on Cardizem and Lopressor, per Cardiology,  no chronic anticoagulation due to bleeding risk, he may be a candidate for Watchman device in the future   8  Toxic metabolic encephalopathy with likely bilateral ischemic CVA, negative carotid ultrasound, repeat head CT scan reviewed, continue aspirin and statin   9  Acute blood loss anemia secondary to esophagitis, gastric ulcer and ischemic colitis, per GI plan to repeat EGD in 6-8 weeks,  continue PPI  10  Left forearm myositis/cellulitis, skin rash with peripheral eosinophilia, switched to oral antibiotic through 03/16, ID is following  11   Left IJ DVT with superficial thrombophlebitis, likely provoked by central line, due to risk of bleeding cardiology recommending no AC, continue aspirin   12  Morbid obesity  13  Depression, Psychiatry input appreciated, started on Zoloft    VTE Pharmacologic Prophylaxis:   Pharmacologic: Heparin  Mechanical VTE Prophylaxis in Place: Yes    Patient Centered Rounds: I have performed bedside rounds with nursing staff today  Discussions with Specialists or Other Care Team Provider:     Education and Discussions with Family / Patient:  Patient    Time Spent for Care: 30 minutes  More than 50% of total time spent on counseling and coordination of care as described above  Current Length of Stay: 49 day(s)    Current Patient Status: Inpatient   Certification Statement: The patient will continue to require additional inpatient hospital stay due to Management of cellulitis    Discharge Plan / Estimated Discharge Date:  Awaiting rehab placement on Saturday at [place]    Code Status: Level 1 - Full Code      Subjective:   Patient seen and examined  Comfortable in bed  No chest pain or shortness of breath  No event overnight      Objective:     Vitals:   Temp (24hrs), Av 1 °F (36 7 °C), Min:97 7 °F (36 5 °C), Max:98 5 °F (36 9 °C)    Temp:  [97 7 °F (36 5 °C)-98 5 °F (36 9 °C)] 98 °F (36 7 °C)  HR:  [56-83] 78  Resp:  [22] 22  BP: ()/(43-75) 124/43  SpO2:  [90 %-93 %] 90 %  Body mass index is 52 48 kg/m²  Input and Output Summary (last 24 hours):        Intake/Output Summary (Last 24 hours) at 3/14/2019 1319  Last data filed at 3/14/2019 1318  Gross per 24 hour   Intake 500 ml   Output 2733 ml   Net -2233 ml       Physical Exam:     Physical Exam     Patient is awake alert in no acute distress  Lung clear to auscultation bilateral anteriorly  Heart positive S1-S2 irregular no murmur  Abdomen soft obese nontender  Lower extremities b/l pitting edema    Additional Data:     Labs:    Results from last 7 days   Lab Units 19  0930 03/14/19  0833 03/12/19  0458   WBC Thousand/uL  --  6 51 8 98   HEMOGLOBIN g/dL 7 8* 6 3* 8 7*   HEMATOCRIT % 25 4* 20 4* 28 1*   PLATELETS Thousands/uL  --  146* 147*   NEUTROS PCT %  --   --  69   LYMPHS PCT %  --   --  7*   MONOS PCT %  --   --  6   EOS PCT %  --   --  16*     Results from last 7 days   Lab Units 03/14/19  0833   POTASSIUM mmol/L 3 7   CHLORIDE mmol/L 101   CO2 mmol/L 32   BUN mg/dL 25   CREATININE mg/dL 3 14*   CALCIUM mg/dL 6 8*           * I Have Reviewed All Lab Data Listed Above  * Additional Pertinent Lab Tests Reviewed:  [address] Admission Reviewed    Imaging:    Imaging Reports Reviewed Today Include:   Imaging Personally Reviewed by Myself Includes:     Recent Cultures (last 7 days):           Last 24 Hours Medication List:     Current Facility-Administered Medications:  acetaminophen 650 mg Oral Q6H PRN KATHY Crawford   aspirin 81 mg Oral Daily Ralph Alicea DO   atorvastatin 40 mg Oral Daily With Delbert Fox, [address]   b complex-vitamin C-folic acid 1 capsule Oral Daily With Noah Martinez MD   bisacodyl 10 mg Rectal Daily PRN KATHY Crawford   diltiazem 180 mg Oral Daily Magaly Acosta MD   doxycycline hyclate 100 mg Oral Q12H [address] Carmen Cardona MD   heparin (porcine) 5,000 Units Subcutaneous [facility] Ralph Alicea DO   hydrocortisone  Topical BID PRN Ralph Alicea DO   magnesium oxide 400 mg Oral Daily Fouzia David MD   melatonin 6 mg Oral HS Leidy Martinez PA-C   metoprolol 5 mg Intravenous Q6H PRN Jovon Gomez MD   metoprolol tartrate 50 mg Oral TID Fouzia David MD   midodrine 10 mg Oral Before Dialysis Render MD Joann   nystatin  Topical BID KATHY Crawford   oxyCODONE 5 mg Oral Q4H PRN Jovon Gomez MD   oxyCODONE 7 5 mg Oral Q4H PRN Jovon Gomez MD   pantoprazole 40 mg Oral BID AC Suresh Eckert PA-C   polyvinyl alcohol 1 drop Both Eyes Q3H PRN KATHY Crawford   sertraline 25 mg Oral After [employer] Ana, MD        Today, Patient Was Seen By: Carey Braden DO    ** Please Note: This note has been constructed using a voice recognition system   **

## 2019-03-14 NOTE — PROGRESS NOTES
Progress Note - Nephrology   [address]aleb [address] 60 y o  male MRN: [id number]  Unit/Bed#: [facility] 834-01 Encounter: [id number]      Assessment / Plan:  1  Acute Renal Failure/AcuteTubular Necrosis (POA, admit sCr 2 63), likely d/t ischemic/septic ATN  Cannot rule out AIN due to rash/eosinophilia  -peripheral eosinophilia improving  -patient seen by me on HD today  Has been dialysis dependent since 19  - Access:  Right IJ PermCath placed 19  -some pulmonary congestion noted on CXR performed yesterday   -did urinate a bit yesterday  -monitor for signs of renal recovery     2  Blood pressure/volume status  -most recent echocardiogram shows ejection fraction of 55%, history of systolic congestive heart failure  -UF goal 2L today     3  MSSA bacteremia  -CHON unremarkable  -antibiotics as per Infectious Disease     4  Hyponatremia  -mild, likely volume mediated, stable  UF as tolerated     5  Anemia - Hgb 6s, up to 7 8 today  Monitor CBC    6  Corrected calcium normal    Subjective:   Patient seen and examined by me on hemodialysis at approximately 9:00 a m     Blood pressure 118/71, UF goal 2 L, blood flow 400 mL/min, dialysate flow 600 mL/min  Patient denies any chest pain or shortness of Breath  He is tolerating dialysis well  Objective:     Vitals: Blood pressure (!) 137/49, pulse 83, temperature 98 °F (36 7 °C), temperature source Oral, resp  rate 22, height 5' 9" (1 753 m), weight (!) 161 kg (355 lb 6 1 oz), SpO2 90 %  ,Body mass index is 52 48 kg/m²  Temp (24hrs), Av 1 °F (36 7 °C), Min:97 7 °F (36 5 °C), Max:98 5 °F (36 9 °C)      Weight (last 2 days)     Date/Time   Weight    19 0600   161 (355 38)  (Abnormal)     19 0600   161 (354 1)  (Abnormal)     19 0600   160 (353 1)  (Abnormal)                 Intake/Output Summary (Last 24 hours) at 3/14/2019 1204  Last data filed at 3/14/2019 0829  Gross per 24 hour   Intake 200 ml   Output 282 ml   Net -82 ml     I/O last 24 hours:   In: 350 [P O :150; I V :200]  Out: 3082 [Urine:282; Other:2800]        Physical Exam:   Physical Exam   Constitutional: He appears well-developed and well-nourished  No distress  HENT:   Head: Normocephalic and atraumatic  Mouth/Throat: No oropharyngeal exudate  Eyes: Right eye exhibits no discharge  Left eye exhibits no discharge  No scleral icterus  Neck: Neck supple  Cardiovascular: Normal rate, regular rhythm and normal heart sounds  Pulmonary/Chest: Effort normal and breath sounds normal  He has no wheezes  He has no rales  Abdominal: Soft  Bowel sounds are normal  He exhibits no distension  There is no tenderness  Musculoskeletal: Normal range of motion  He exhibits no edema  Neurological: He is alert  awake   Skin: Skin is warm and dry  No rash noted  He is not diaphoretic  Psychiatric: He has a normal mood and affect  His behavior is normal    Vitals reviewed        Invasive Devices     Peripheral Intravenous Line            Peripheral IV 03/12/19 Right Antecubital 1 day          Hemodialysis Catheter            Permanent HD Catheter  24 days                Medications:    Scheduled Meds:  Current Facility-Administered Medications:  acetaminophen 650 mg Oral Q6H PRN KATHY Khanna   aspirin 81 mg Oral Daily Tiny Redo, DO   atorvastatin 40 mg Oral Daily With KATHY Baker   b complex-vitamin C-folic acid 1 capsule Oral Daily With Rex Guerrier MD   bisacodyl 10 mg Rectal Daily PRN KATHY Khanna   diltiazem 180 mg Oral Daily Johanny Sky MD   doxycycline hyclate 100 mg Oral Q12H [address] Mary Raygoza MD   heparin (porcine) 5,000 Units Subcutaneous [facility] Tiny Redo, DO   hydrocortisone  Topical BID PRN Tiny Redo, DO   magnesium oxide 400 mg Oral Daily Pa Ramos MD   melatonin 6 mg Oral HS Leidy Martinez PA-C   metoprolol 5 mg Intravenous Q6H PRN Enma Coats MD   metoprolol tartrate 50 mg Oral TID Pa Ramos MD   midodrine 10 mg Oral Before Dialysis Pa Ramos MD   nystatin  Topical BID BrowningKATHY Em   oxyCODONE 5 mg Oral Q4H PRN Enma Coats MD   oxyCODONE 7 5 mg Oral Q4H PRN Enma Coats MD   pantoprazole 40 mg Oral BID AC Suresh Eckert PA-C   polyvinyl alcohol 1 drop Both Eyes Q3H PRN Browningmarty Torres CRNP   sertraline 25 mg Oral After Bud MD Kd       PRN Meds:   acetaminophen    bisacodyl    hydrocortisone    metoprolol    midodrine    oxyCODONE    oxyCODONE    polyvinyl alcohol    Continuous Infusions:         LAB RESULTS:      Results from last 7 days   Lab Units 03/14/19  0931 03/14/19  0833 03/12/19  0458 03/11/19  0635 03/10/19  0705 03/09/19  0937 03/09/19  0935 03/08/19  0922 03/08/19  0430   WBC Thousand/uL  --  6 51 8 98 8 41  --  10 33*  --   --  12 91*   HEMOGLOBIN g/dL 7 8* 6 3* 8 7* 9 1*  --  8 0*  --  9 6* 9 3*   HEMATOCRIT % 25 4* 20 4* 28 1* 29 9*  --  26 4*  --  31 1* 30 4*   PLATELETS Thousands/uL  --  146* 147* 158  --  135*  --   --  173   NEUTROS PCT %  --   --  69 61  --  66  --   --  65   LYMPHS PCT %  --   --  7* 8*  --  7*  --   --  6*   MONOS PCT %  --   --  6 7  --  8  --   --  7   EOS PCT %  --   --  16* 21*  --  16*  --   --  19*   POTASSIUM mmol/L  --  3 7 4 5 3 9 4 0  --  3 9  --  3 5   CHLORIDE mmol/L  --  101 98* 96* 98*  --  98*  --  98*   CO2 mmol/L  --  32 27 31 28  --  28  --  31   BUN mg/dL  --  25 43* 34* 22  --  35*  --  24   CREATININE mg/dL  --  3 14* 4 86* 4 24* 3 37*  --  4 40*  --  3 34*   CALCIUM mg/dL  --  6 8* 7 4* 7 7* 7 5*  --  7 4*  --  7 1*   MAGNESIUM mg/dL  --   --  2 0 1 9 1 9  --  1 8  --  1 8       CUTURES:  Lab Results   Component Value Date    BLOODCX No Growth After 5 Days  01/27/2019    BLOODCX No Growth After 5 Days   01/27/2019    BLOODCX Staphylococcus aureus (A) 01/26/2019    BLOODCX Staphylococcus aureus (A) 01/26/2019    BLOODCX Staphylococcus aureus (A) 01/24/2019    BLOODCX Staphylococcus aureus (A) 01/24/2019    BLOODCX Staphylococcus aureus (A) 01/23/2019    URINECX 4530-3797 cfu/ml Gram Positive Cocci (A) 01/23/2019                 Portions of the record may have been created with voice recognition software  Occasional wrong word or "sound a like" substitutions may have occurred due to the inherent limitations of voice recognition software  Read the chart carefully and recognize, using context, where substitutions have occurred  If you have any questions, please contact the dictating provider

## 2019-03-14 NOTE — PLAN OF CARE
Problem: Potential for Falls  Goal: Patient will remain free of falls  Description  INTERVENTIONS:  - Assess patient frequently for physical needs  -  Identify cognitive and physical deficits and behaviors that affect risk of falls  -  [place] fall precautions as indicated by assessment   - Educate patient/family on patient safety including physical limitations  - Instruct patient to call for assistance with activity based on assessment  - Modify environment to reduce risk of injury  - Consider OT/PT consult to assist with strengthening/mobility    3/14/2019 0001 by Randy Irwin  Outcome: Progressing  3/14/2019 0001 by Randy Irwin  Outcome: Progressing     Problem: Prexisting or High Potential for Compromised Skin Integrity  Goal: Skin integrity is maintained or improved  Description  INTERVENTIONS:  - Identify patients at risk for skin breakdown  - Assess and monitor skin integrity  - Assess and monitor nutrition and hydration status  - Monitor labs (i e  albumin)  - Assess for incontinence   - Turn and reposition patient  - Assist with mobility/ambulation  - Relieve pressure over bony prominences  - Avoid friction and shearing  - Provide appropriate hygiene as needed including keeping skin clean and dry  - Evaluate need for skin moisturizer/barrier cream  - Collaborate with interdisciplinary team (i e  Nutrition, Rehabilitation, etc )   - Patient/family teaching   3/14/2019 0001 by Randy Irwin  Outcome: Progressing  3/14/2019 0001 by Randy Irwin  Outcome: Progressing     Problem: Nutrition/Hydration-ADULT  Goal: Nutrient/Hydration intake appropriate for improving, restoring or maintaining nutritional needs  Description  Monitor and assess patient's nutrition/hydration status for malnutrition (ex- brittle hair, bruises, dry skin, pale skin and conjunctiva, muscle wasting, smooth red tongue, and disorientation)   Collaborate with interdisciplinary team and initiate plan and interventions as ordered  Monitor patient's weight and dietary intake as ordered or per policy  Utilize nutrition screening tool and intervene per policy  Determine patient's food preferences and provide high-protein, high-caloric foods as appropriate  INTERVENTIONS:  - Monitor oral intake, urinary output, labs, and treatment plans  - Assess nutrition and hydration status and recommend course of action  - Evaluate amount of meals eaten  - Assist patient with eating if necessary   - Allow adequate time for meals  - Recommend/ encourage appropriate diets, oral nutritional supplements, and vitamin/mineral supplements  - Order, calculate, and assess calorie counts as needed  - Recommend, monitor, and adjust tube feedings and TPN/PPN based on assessed needs  - Assess need for intravenous fluids  - Provide specific nutrition/hydration education as appropriate  - Include patient/family/caregiver in decisions related to nutrition   3/14/2019 0001 by Tony Ragland  Outcome: Progressing  3/14/2019 0001 by Tony Ragland  Outcome: Progressing     Problem: CARDIOVASCULAR - ADULT  Goal: Maintains optimal cardiac output and hemodynamic stability  Description  INTERVENTIONS:  - Monitor I/O, vital signs and rhythm  - Monitor for S/S and trends of decreased cardiac output i e  bleeding, hypotension  - Administer and titrate ordered vasoactive medications to optimize hemodynamic stability  - Assess quality of pulses, skin color and temperature  - Assess for signs of decreased coronary artery perfusion - ex   Angina  - Instruct patient to report change in severity of symptoms   3/14/2019 0001 by Tony Ragland  Outcome: Progressing  3/14/2019 0001 by Tony Ragland  Outcome: Progressing  Goal: Absence of cardiac dysrhythmias or at baseline rhythm  Description  INTERVENTIONS:  - Continuous cardiac monitoring, monitor vital signs, obtain 12 lead EKG if indicated  - Administer antiarrhythmic and heart rate control medications as ordered  - Monitor electrolytes and administer replacement therapy as ordered   3/14/2019 0001 by Tara Gallegos  Outcome: Progressing  3/14/2019 0001 by Tara Gallegos  Outcome: Progressing     Problem: METABOLIC, FLUID AND ELECTROLYTES - ADULT  Goal: Electrolytes maintained within normal limits  Description  INTERVENTIONS:  - Monitor labs and assess patient for signs and symptoms of electrolyte imbalances  - Administer electrolyte replacement as ordered  - Monitor response to electrolyte replacements, including repeat lab results as appropriate  - Instruct patient on fluid and nutrition as appropriate   3/14/2019 0001 by Tara Gallegos  Outcome: Progressing  3/14/2019 0001 by Tara Gallegos  Outcome: Progressing  Goal: Fluid balance maintained  Description  INTERVENTIONS:  - Monitor labs and assess for signs and symptoms of volume excess or deficit  - Monitor I/O and WT  - Instruct patient on fluid and nutrition as appropriate   3/14/2019 0001 by Tara Gallegos  Outcome: Progressing  3/14/2019 0001 by Tara Gallegos  Outcome: Progressing     Problem: GENITOURINARY - ADULT  Goal: Maintains or returns to baseline urinary function  Description  INTERVENTIONS:  - Assess urinary function  - Encourage oral fluids to ensure adequate hydration  - Administer IV fluids as ordered to ensure adequate hydration  - Administer ordered medications as needed  - Offer frequent toileting  - Follow urinary retention protocol if ordered   3/14/2019 0001 by Tara Gallegos  Outcome: Progressing  3/14/2019 0001 by Tara Gallegos  Outcome: Progressing  Goal: Absence of urinary retention  Description  INTERVENTIONS:  - Assess patient?s ability to void and empty bladder  - Monitor I/O  - Bladder scan as needed  - Discuss with physician/AP medications to alleviate retention as needed  - Discuss catheterization for long term situations as appropriate   3/14/2019 0001 by Tara Gallegos  Outcome: Progressing  3/14/2019 0001 by Addie Jensen Coy  Outcome: Progressing     Problem: PAIN - ADULT  Goal: Verbalizes/displays adequate comfort level or baseline comfort level  Description  Interventions:  - Encourage patient to monitor pain and request assistance  - Assess pain using appropriate pain scale  - Administer analgesics based on type and severity of pain and evaluate response  - Implement non-pharmacological measures as appropriate and evaluate response  - Consider cultural and social influences on pain and pain management  - Notify physician/advanced practitioner if interventions unsuccessful or patient reports new pain   3/14/2019 0001 by Elayne Bernal  Outcome: Progressing  3/14/2019 0001 by Elayne Bernal  Outcome: Progressing     Problem: INFECTION - ADULT  Goal: Absence or prevention of progression during hospitalization  Description  INTERVENTIONS:  - Assess and monitor for signs and symptoms of infection  - Monitor lab/diagnostic results  - Monitor all insertion sites, i e  indwelling lines, tubes, and drains  - Monitor endotracheal (as able) and nasal secretions for changes in amount and color  - [place] appropriate cooling/warming therapies per order  - Administer medications as ordered  - Instruct and encourage patient and family to use good hand hygiene technique  - Identify and instruct in appropriate isolation precautions for identified infection/condition    3/14/2019 0001 by Elayne Bernal  Outcome: Progressing  3/14/2019 0001 by Elayne Bernal  Outcome: Progressing     Problem: SAFETY ADULT  Goal: Patient will remain free of falls  Description  INTERVENTIONS:  - Assess patient frequently for physical needs  -  Identify cognitive and physical deficits and behaviors that affect risk of falls    -  [place] fall precautions as indicated by assessment   - Educate patient/family on patient safety including physical limitations  - Instruct patient to call for assistance with activity based on assessment  - Modify environment to reduce risk of injury  - Consider OT/PT consult to assist with strengthening/mobility    3/14/2019 0001 by Will See  Outcome: Progressing  3/14/2019 0001 by Will See  Outcome: Progressing  Goal: Maintain or return to baseline ADL function  Description  INTERVENTIONS:  -  Assess patient's ability to carry out ADLs; assess patient's baseline for ADL function and identify physical deficits which impact ability to perform ADLs (bathing, care of mouth/teeth, toileting, grooming, dressing, etc )  - Assess/evaluate cause of self-care deficits   - Assess range of motion  - Assess patient's mobility; develop plan if impaired  - Assess patient's need for assistive devices and provide as appropriate  - Encourage maximum independence but intervene and supervise when necessary  ¯ Involve family in performance of ADLs  ¯ Assess for home care needs following discharge   ¯ Request OT consult to assist with ADL evaluation and planning for discharge  ¯ Provide patient education as appropriate    3/14/2019 0001 by Will See  Outcome: Progressing  3/14/2019 0001 by Will See  Outcome: Progressing  Goal: Maintain or return mobility status to optimal level  Description  INTERVENTIONS:  - Assess patient's baseline mobility status (ambulation, transfers, stairs, etc )    - Identify cognitive and physical deficits and behaviors that affect mobility  - Identify mobility aids required to assist with transfers and/or ambulation (gait belt, sit-to-stand, lift, walker, cane, etc )  - [place] fall precautions as indicated by assessment  - Record patient progress and toleration of activity level on Mobility SBAR; progress patient to next Phase/Stage  - Instruct patient to call for assistance with activity based on assessment  - Request Rehabilitation consult to assist with strengthening/weightbearing, etc     3/14/2019 0001 by Will See  Outcome: Progressing  3/14/2019 0001 by Will See  Outcome: Progressing     Problem: DISCHARGE PLANNING  Goal: Discharge to home or other facility with appropriate resources  Description  INTERVENTIONS:  - Identify barriers to discharge w/patient and caregiver  - Arrange for needed discharge resources and transportation as appropriate  - Identify discharge learning needs (meds, wound care, etc )  - Arrange for interpretive services to assist at discharge as needed  - Refer to Case Management Department for coordinating discharge planning if the patient needs post-hospital services based on physician/advanced practitioner order or complex needs related to functional status, cognitive ability, or social support system   3/14/2019 0001 by Will See  Outcome: Progressing  3/14/2019 0001 by Will See  Outcome: Progressing     Problem: Knowledge Deficit  Goal: Patient/family/caregiver demonstrates understanding of disease process, treatment plan, medications, and discharge instructions  Description  Complete learning assessment and assess knowledge base    Interventions:  - Provide teaching at level of understanding  - Provide teaching via preferred learning methods   3/14/2019 0001 by Will See  Outcome: Progressing  3/14/2019 0001 by Will See  Outcome: Progressing     Problem: DISCHARGE PLANNING - CARE MANAGEMENT  Goal: Discharge to post-acute care or home with appropriate resources  Description  INTERVENTIONS:  - Conduct assessment to determine patient/family and health care team treatment goals, and need for post-acute services based on payer coverage, community resources, and patient preferences, and barriers to discharge  - Address psychosocial, clinical, and financial barriers to discharge as identified in assessment in conjunction with the patient/family and health care team  - Arrange appropriate level of post-acute services according to patient's   needs and preference and payer coverage in collaboration with the physician and health care team  - Communicate with and update the patient/family, physician, and health care team regarding progress on the discharge plan  - Arrange appropriate transportation to post-acute venues  - Pt to d/c with appropriate resources when medically stable     3/14/2019 0001 by Stephanie Gasca  Outcome: Progressing  3/14/2019 0001 by Stephanie Gasca  Outcome: Progressing

## 2019-03-14 NOTE — SOCIAL WORK
Pt discussed during care coordination rounds & informed pt should be medically cleared on Saturday  Called [facility] [phone] & s/w Blas to arrange for BLS bariatric transport on Saturday to [place] at 3pm after HD  Will need to submit for auth on Friday & for transportation  Will need to provided auth details for transport to [facility] & fax CMN Friday to fax [phone]

## 2019-03-14 NOTE — PLAN OF CARE
Problem: PHYSICAL THERAPY ADULT  Goal: Performs mobility at highest level of function for planned discharge setting  See evaluation for individualized goals  Description  Treatment/Interventions: LE strengthening/ROM, Therapeutic exercise, Endurance training, Patient/family training, Cognitive reorientation, Equipment eval/education, Bed mobility, Spoke to advanced practitioner, Spoke to case management, Spoke to nursing  Equipment Recommended:  (TBD- may benefit from [facility] bed)       See flowsheet documentation for full assessment, interventions and recommendations     Outcome: Progressing

## 2019-03-14 NOTE — HEMODIALYSIS
Tx terminated, pt completed full tx per MD order  Removed 2 5 L today and pt tolerated well    Vital signs stable on reinfusion

## 2019-03-14 NOTE — QUICK NOTE
Patient down at dialysis at time of evaluation  No acute events were noted overnight on chart review and the patient remains hemodynamically stable and without any leukocytosis  Currently remains on doxycycline  Would plan to continue doxycycline through 3/16  Patient will require surveillance blood cultures for his previous bacteremia  Will formally re-evaluate the patient in consult tomorrow if he is still admitted

## 2019-03-14 NOTE — PROGRESS NOTES
I spoke with Ruth from dialysis to get a stat repeat hemoglobin on patient this morning  Awaiting results

## 2019-03-14 NOTE — PHYSICAL THERAPY NOTE
PHYSICAL THERAPY NOTE       03/14/19 0737   Pain Assessment   Pain Assessment FLACC   Pain Rating: FLACC (Rest) - Face 0   Pain Rating: FLACC (Rest) - Legs 0   Pain Rating: FLACC (Rest) - Activity 1   Pain Rating: FLACC (Rest) - Cry 2   Pain Rating: FLACC (Rest) - Consolability 1   Score: FLACC (Rest) 4   Restrictions/Precautions   Weight Bearing Precautions Per Order Yes   LUE Weight Bearing Per Order Other  (in sling)   Braces or Orthoses   (foam sling LUE)   Other Precautions Pain; Fall Risk;Cognitive;Multiple lines;Telemetry; Bed Alarm   General   Chart Reviewed Yes   Response to Previous Treatment Other (Comment)  (pt reports pain in ankles after previous treatment)   Family/Caregiver Present No   Cognition   Overall Cognitive Status Impaired   Arousal/Participation Other (Comment); Cooperative  (encouragement needed througout session )   Attention Attends with cues to redirect   Orientation Level Oriented to person;Oriented to place   Memory Decreased short term memory;Decreased recall of recent events;Decreased recall of precautions   Following Commands Follows one step commands with increased time or repetition   Subjective   Subjective agreeable to being repositioned in bed for safety   Bed Mobility   Rolling R 2  Maximal assistance   Additional items Assist x 2;Bedrails; Increased time required;Verbal cues   Rolling L 2  Maximal assistance   Additional items Assist x 2   Additional items Comment  (Depx3 to boost to [facility] in trendelenburg position)   Additional Comments pt positionined in chair position at end of session    Balance   Static Sitting Fair  (in supported sitting in chair position, R lean)   Endurance Deficit   Endurance Deficit Yes   Endurance Deficit Description fatigues quickly, max encouragement to participate   Activity Tolerance   Activity Tolerance Patient limited by fatigue; Other (Comment)  (limited by cognition)   Nurse Made Aware ok to see per RN   Medical Staff Made Aware OT [facility] Assessment   Prognosis Fair   Problem List Decreased strength;Decreased range of motion;Decreased endurance; Impaired balance;Decreased mobility; Decreased cognition; Impaired judgement;Obesity; Decreased skin integrity;Pain   Assessment Mr  Gisela Rizvi participated in PT session focused on bed mobility and upright tolerance in chair position  He required 2 attempts to see and max encouragement during session  He did participate in rolling to each side x3 reps for repositioning and for use of bedpan  Extra time for pt to get in set to reach for bedrail to assist with rolling  MaxA x2 for rolling throughout  With sitting in chair position, pt requires pillows on both sides and support under feet in order to maintain midline in setting of R lean  Nursing made aware as well with recommendation to check on pt every 15-20 min when in chair position  RN agreeable  Pt would benefit from continued skilled PT while in hospital as well as inpatient rehab on discharge in order to maximize functional mobility and safety  Barriers to Discharge Inaccessible home environment   Goals   Patient Goals not stated today   STG Expiration Date 03/22/19   Short Term Goal #1 In 10 days pt will complete: 1) Bed mobility skills (supine to sit) with mod A x2 to increase safety and independence as well as decrease caregiver burden  2) Improve balance grades to fair to increase safety with all mobility and decrease fall risk  3) Improve BLE strength by 1/2 grade to help increase overall functional mobility and decrease fall risk  4) PT for ongoing pt and family education; DME needs and D/C planning to promote highest level of function in least restrictive environment  Transfers and ambulation to be assessed when appropriate  PT to see at this time  Treatment Day 6   Plan   Treatment/Interventions Functional transfer training;LE strengthening/ROM; Therapeutic exercise; Endurance training;Cognitive reorientation;Patient/family training;Equipment eval/education; Bed mobility   Progress Slow progress, medical status limitations   PT Frequency Other (Comment)  (3-5x/wk)   Recommendation   Recommendation Short-term skilled PT   Equipment Recommended Other (Comment)  (TBD)   PT - OK to Discharge Yes  (to rehab when med [place])     Cheyenne [place], PT

## 2019-03-14 NOTE — SOCIAL WORK
TC from [facility] admissions [phone] stating pt is good to start on Tuesday at the [facility] & they requested updated nephrology notes to be sent to them  Uploaded into ecin & re-sent referral to them  Called admissions & informed them

## 2019-03-14 NOTE — PLAN OF CARE
Problem: Potential for Falls  Goal: Patient will remain free of falls  Description  INTERVENTIONS:  - Assess patient frequently for physical needs  -  Identify cognitive and physical deficits and behaviors that affect risk of falls  -  [place] fall precautions as indicated by assessment   - Educate patient/family on patient safety including physical limitations  - Instruct patient to call for assistance with activity based on assessment  - Modify environment to reduce risk of injury  - Consider OT/PT consult to assist with strengthening/mobility    Outcome: Progressing     Problem: Prexisting or High Potential for Compromised Skin Integrity  Goal: Skin integrity is maintained or improved  Description  INTERVENTIONS:  - Identify patients at risk for skin breakdown  - Assess and monitor skin integrity  - Assess and monitor nutrition and hydration status  - Monitor labs (i e  albumin)  - Assess for incontinence   - Turn and reposition patient  - Assist with mobility/ambulation  - Relieve pressure over bony prominences  - Avoid friction and shearing  - Provide appropriate hygiene as needed including keeping skin clean and dry  - Evaluate need for skin moisturizer/barrier cream  - Collaborate with interdisciplinary team (i e  Nutrition, Rehabilitation, etc )   - Patient/family teaching   Outcome: Progressing     Problem: Nutrition/Hydration-ADULT  Goal: Nutrient/Hydration intake appropriate for improving, restoring or maintaining nutritional needs  Description  Monitor and assess patient's nutrition/hydration status for malnutrition (ex- brittle hair, bruises, dry skin, pale skin and conjunctiva, muscle wasting, smooth red tongue, and disorientation)  Collaborate with interdisciplinary team and initiate plan and interventions as ordered  Monitor patient's weight and dietary intake as ordered or per policy  Utilize nutrition screening tool and intervene per policy   Determine patient's food preferences and provide high-protein, high-caloric foods as appropriate  INTERVENTIONS:  - Monitor oral intake, urinary output, labs, and treatment plans  - Assess nutrition and hydration status and recommend course of action  - Evaluate amount of meals eaten  - Assist patient with eating if necessary   - Allow adequate time for meals  - Recommend/ encourage appropriate diets, oral nutritional supplements, and vitamin/mineral supplements  - Order, calculate, and assess calorie counts as needed  - Recommend, monitor, and adjust tube feedings and TPN/PPN based on assessed needs  - Assess need for intravenous fluids  - Provide specific nutrition/hydration education as appropriate  - Include patient/family/caregiver in decisions related to nutrition   Outcome: Progressing     Problem: CARDIOVASCULAR - ADULT  Goal: Maintains optimal cardiac output and hemodynamic stability  Description  INTERVENTIONS:  - Monitor I/O, vital signs and rhythm  - Monitor for S/S and trends of decreased cardiac output i e  bleeding, hypotension  - Administer and titrate ordered vasoactive medications to optimize hemodynamic stability  - Assess quality of pulses, skin color and temperature  - Assess for signs of decreased coronary artery perfusion - ex   Angina  - Instruct patient to report change in severity of symptoms   Outcome: Progressing  Goal: Absence of cardiac dysrhythmias or at baseline rhythm  Description  INTERVENTIONS:  - Continuous cardiac monitoring, monitor vital signs, obtain 12 lead EKG if indicated  - Administer antiarrhythmic and heart rate control medications as ordered  - Monitor electrolytes and administer replacement therapy as ordered   Outcome: Progressing     Problem: METABOLIC, FLUID AND ELECTROLYTES - ADULT  Goal: Electrolytes maintained within normal limits  Description  INTERVENTIONS:  - Monitor labs and assess patient for signs and symptoms of electrolyte imbalances  - Administer electrolyte replacement as ordered  - Monitor response to electrolyte replacements, including repeat lab results as appropriate  - Instruct patient on fluid and nutrition as appropriate   Outcome: Progressing  Goal: Fluid balance maintained  Description  INTERVENTIONS:  - Monitor labs and assess for signs and symptoms of volume excess or deficit  - Monitor I/O and WT  - Instruct patient on fluid and nutrition as appropriate   Outcome: Progressing     Problem: PAIN - ADULT  Goal: Verbalizes/displays adequate comfort level or baseline comfort level  Description  Interventions:  - Encourage patient to monitor pain and request assistance  - Assess pain using appropriate pain scale  - Administer analgesics based on type and severity of pain and evaluate response  - Implement non-pharmacological measures as appropriate and evaluate response  - Consider cultural and social influences on pain and pain management  - Notify physician/advanced practitioner if interventions unsuccessful or patient reports new pain   Outcome: Progressing     Problem: INFECTION - ADULT  Goal: Absence or prevention of progression during hospitalization  Description  INTERVENTIONS:  - Assess and monitor for signs and symptoms of infection  - Monitor lab/diagnostic results  - Monitor all insertion sites, i e  indwelling lines, tubes, and drains  - Monitor endotracheal (as able) and nasal secretions for changes in amount and color  - [place] appropriate cooling/warming therapies per order  - Administer medications as ordered  - Instruct and encourage patient and family to use good hand hygiene technique  - Identify and instruct in appropriate isolation precautions for identified infection/condition    Outcome: Progressing     Problem: SAFETY ADULT  Goal: Patient will remain free of falls  Description  INTERVENTIONS:  - Assess patient frequently for physical needs  -  Identify cognitive and physical deficits and behaviors that affect risk of falls    -  [place] fall precautions as indicated by assessment   - Educate patient/family on patient safety including physical limitations  - Instruct patient to call for assistance with activity based on assessment  - Modify environment to reduce risk of injury  - Consider OT/PT consult to assist with strengthening/mobility    Outcome: Progressing  Goal: Maintain or return to baseline ADL function  Description  INTERVENTIONS:  -  Assess patient's ability to carry out ADLs; assess patient's baseline for ADL function and identify physical deficits which impact ability to perform ADLs (bathing, care of mouth/teeth, toileting, grooming, dressing, etc )  - Assess/evaluate cause of self-care deficits   - Assess range of motion  - Assess patient's mobility; develop plan if impaired  - Assess patient's need for assistive devices and provide as appropriate  - Encourage maximum independence but intervene and supervise when necessary  ¯ Involve family in performance of ADLs  ¯ Assess for home care needs following discharge   ¯ Request OT consult to assist with ADL evaluation and planning for discharge  ¯ Provide patient education as appropriate    Outcome: Progressing  Goal: Maintain or return mobility status to optimal level  Description  INTERVENTIONS:  - Assess patient's baseline mobility status (ambulation, transfers, stairs, etc )    - Identify cognitive and physical deficits and behaviors that affect mobility  - Identify mobility aids required to assist with transfers and/or ambulation (gait belt, sit-to-stand, lift, walker, cane, etc )  - [place] fall precautions as indicated by assessment  - Record patient progress and toleration of activity level on Mobility SBAR; progress patient to next Phase/Stage  - Instruct patient to call for assistance with activity based on assessment  - Request Rehabilitation consult to assist with strengthening/weightbearing, etc     Outcome: Progressing     Problem: DISCHARGE PLANNING  Goal: Discharge to home or other facility with appropriate resources  Description  INTERVENTIONS:  - Identify barriers to discharge w/patient and caregiver  - Arrange for needed discharge resources and transportation as appropriate  - Identify discharge learning needs (meds, wound care, etc )  - Arrange for interpretive services to assist at discharge as needed  - Refer to Case Management Department for coordinating discharge planning if the patient needs post-hospital services based on physician/advanced practitioner order or complex needs related to functional status, cognitive ability, or social support system   Outcome: Progressing     Problem: Knowledge Deficit  Goal: Patient/family/caregiver demonstrates understanding of disease process, treatment plan, medications, and discharge instructions  Description  Complete learning assessment and assess knowledge base    Interventions:  - Provide teaching at level of understanding  - Provide teaching via preferred learning methods   Outcome: Progressing     Problem: GENITOURINARY - ADULT  Goal: Maintains or returns to baseline urinary function  Description  INTERVENTIONS:  - Assess urinary function  - Encourage oral fluids to ensure adequate hydration  - Administer IV fluids as ordered to ensure adequate hydration  - Administer ordered medications as needed  - Offer frequent toileting  - Follow urinary retention protocol if ordered   Outcome: Progressing  Goal: Absence of urinary retention  Description  INTERVENTIONS:  - Assess patient?s ability to void and empty bladder  - Monitor I/O  - Bladder scan as needed  - Discuss with physician/AP medications to alleviate retention as needed  - Discuss catheterization for long term situations as appropriate   Outcome: Progressing     Problem: DISCHARGE PLANNING - CARE MANAGEMENT  Goal: Discharge to post-acute care or home with appropriate resources  Description  INTERVENTIONS:  - Conduct assessment to determine patient/family and health care team treatment goals, and need for post-acute services based on payer coverage, community resources, and patient preferences, and barriers to discharge  - Address psychosocial, clinical, and financial barriers to discharge as identified in assessment in conjunction with the patient/family and health care team  - Arrange appropriate level of post-acute services according to patient's   needs and preference and payer coverage in collaboration with the physician and health care team  - Communicate with and update the patient/family, physician, and health care team regarding progress on the discharge plan  - Arrange appropriate transportation to post-acute venues  - Pt to d/c with appropriate resources when medically stable     Outcome: Progressing

## 2019-03-14 NOTE — OCCUPATIONAL THERAPY NOTE
[address] Progress Note     Patient Name: Deepak Reeves  DPDBA'N Date: 3/14/2019  Problem List  Patient Active Problem List   Diagnosis    Aortic stenosis, mild    Essential hypertension    Hyperlipidemia    Left bundle branch block    Murmur    Osteoarthritis of both knees    Pericardial disease    Sciatica    Age-related cataract of right eye    Venous insufficiency    Bilateral leg edema    Stress-induced cardiomyopathy    History of aortic valve replacement with bioprosthetic valve    Persistent atrial fibrillation (HCC)    MSSA bacteremia - Resolved septic shock    Acute blood loss anemia - Gastric ulcer    Leukocytosis    Cerebrovascular accident ([address] )    Acute metabolic encephalopathy    Skin rash    Acute renal failure    GI bleed    Coagulopathy (HCC)    GI bleeding    Age-related nuclear cataract, bilateral    Open angle with borderline findings, low risk, bilateral    Presence of intraocular lens    Vitreous degeneration of both eyes    Left arm swelling    Deep tissue injury    Dysphagia    Cellulitis/myositis of left forearm    Left internal jugular vein thrombus    Morbid obesity     Depression           03/14/19 1352   Restrictions/Precautions   Weight Bearing Precautions Per Order Yes   LUE Weight Bearing Per Order   (sling)   Other Precautions Pain; Fall Risk;Telemetry   General   Response to Previous Treatment Patient reporting fatigue but able to participate   Lifestyle   Autonomy I ADLS, IADLS, transfers and functional mobility PTA   Reciprocal Relationships Pt lives w/ spouse and mother in law   Service to Others Pt works full time as a     Intrinsic Gratification pt likes trains   Pain Assessment   Pain Assessment FLACC   Pain Rating: FLACC (Rest) - Face 0   Pain Rating: FLACC (Rest) - Legs 0   Pain Rating: FLACC (Rest) - Activity 1   Pain Rating: FLACC (Rest) - Cry 2   Pain Rating: FLACC (Rest) - Consolability 1   Score: FLACC (Rest) 4 Pain Rating: FLACC (Activity) - Face 1   Pain Rating: FLACC (Activity) - Legs 1   Pain Rating: FLACC (Activity) - Activity 1   Pain Rating: FLACC (Activity) - Cry 1   Pain Rating: FLACC (Activity) - Consolability 1   Score: FLACC (Activity) 5   ADL   Where Assessed Supine, bed   Toileting Assistance  1  Total Assistance   Toileting Deficit Perineal hygiene;Use of bedpan/urinal setup   Bed Mobility   Rolling R 2  Maximal assistance   Additional items Assist x 2; Increased time required;Verbal cues;LE management; Bedrails   Rolling L 2  Maximal assistance   Additional items Assist x 2; Increased time required;Verbal cues;LE management   Additional Comments pt left in chair position at end of session   Cognition   Overall Cognitive Status Impaired   Arousal/Participation Cooperative   Attention Attends with cues to redirect   Orientation Level Oriented to person;Oriented to place; Disoriented to time;Disoriented to situation   Memory Decreased recall of precautions;Decreased recall of recent events;Decreased short term memory   Following Commands Follows one step commands with increased time or repetition   Activity Tolerance   Activity Tolerance Patient limited by fatigue;Patient limited by pain   Medical Staff Made Aware Pt Carol present, RN updated at end of session   Assessment   Assessment Patient participated in Skilled OT session this date with interventions consisting of  toileting, bed mobility, and positioning*   Patient agreeable to OT treatment session, upon arrival patient was found supine in bed  In comparison to previous session, patient with improvements in ability to tolerate chair position*   Patient requiring verbal cues for safety, verbal cues for correct technique, verbal cues for pacing thru activity steps and cognitive assistance to anticipate next step   Patient continues to be functioning below baseline level, occupational performance remains limited secondary to factors listed above and increased risk for falls and injury  From OT standpoint, recommendation at time of d/c would be Short Term Rehab  Patient to benefit from continued Occupational Therapy treatment while in the hospital to address deficits as defined above and maximize level of functional independence with ADLs and functional mobility  Plan   Treatment Interventions ADL retraining; Endurance training   Goal Expiration Date 03/27/19   Treatment Day 2   OT Frequency 3-5x/wk   Recommendation   OT Discharge Recommendation Short Term Rehab   OT - OK to Discharge Yes     Ladonna Garcia MS, OTR/L

## 2019-03-14 NOTE — SOCIAL WORK
Edy received call from Desirae with [employer] requesting copy of patient's insurance cards  Necessary information faxed to Desirae at [phone] per Desirae's request   Edy following

## 2019-03-15 LAB
ANION GAP SERPL CALCULATED.3IONS-SCNC: 6 MMOL/L (ref 4–13)
BUN SERPL-MCNC: 24 MG/DL (ref 5–25)
CALCIUM SERPL-MCNC: 7.8 MG/DL (ref 8.3–10.1)
CHLORIDE SERPL-SCNC: 100 MMOL/L (ref 100–108)
CO2 SERPL-SCNC: 29 MMOL/L (ref 21–32)
CREAT SERPL-MCNC: 3.39 MG/DL (ref 0.6–1.3)
GFR SERPL CREATININE-BSD FRML MDRD: 19 ML/MIN/1.73SQ M
GLUCOSE SERPL-MCNC: 84 MG/DL (ref 65–140)
POTASSIUM SERPL-SCNC: 4 MMOL/L (ref 3.5–5.3)
SODIUM SERPL-SCNC: 135 MMOL/L (ref 136–145)

## 2019-03-15 PROCEDURE — 97530 THERAPEUTIC ACTIVITIES: CPT

## 2019-03-15 PROCEDURE — 80048 BASIC METABOLIC PNL TOTAL CA: CPT | Performed by: INTERNAL MEDICINE

## 2019-03-15 PROCEDURE — G0515 COGNITIVE SKILLS DEVELOPMENT: HCPCS

## 2019-03-15 PROCEDURE — 97110 THERAPEUTIC EXERCISES: CPT

## 2019-03-15 PROCEDURE — 99233 SBSQ HOSP IP/OBS HIGH 50: CPT | Performed by: INTERNAL MEDICINE

## 2019-03-15 PROCEDURE — 97127 HB COGNITIVE SKILLS DEVELOPMENT, EACH 15 MUNUTES: CPT

## 2019-03-15 PROCEDURE — 99232 SBSQ HOSP IP/OBS MODERATE 35: CPT | Performed by: INTERNAL MEDICINE

## 2019-03-15 RX ADMIN — Medication 1 CAPSULE: at 17:47

## 2019-03-15 RX ADMIN — HEPARIN SODIUM 5000 UNITS: 5000 INJECTION INTRAVENOUS; SUBCUTANEOUS at 06:14

## 2019-03-15 RX ADMIN — NYSTATIN 1 APPLICATION: 100000 POWDER TOPICAL at 17:47

## 2019-03-15 RX ADMIN — HEPARIN SODIUM 5000 UNITS: 5000 INJECTION INTRAVENOUS; SUBCUTANEOUS at 13:59

## 2019-03-15 RX ADMIN — METOPROLOL TARTRATE 50 MG: 50 TABLET, FILM COATED ORAL at 17:47

## 2019-03-15 RX ADMIN — PANTOPRAZOLE SODIUM 40 MG: 40 TABLET, DELAYED RELEASE ORAL at 06:14

## 2019-03-15 RX ADMIN — HEPARIN SODIUM 5000 UNITS: 5000 INJECTION INTRAVENOUS; SUBCUTANEOUS at 22:09

## 2019-03-15 RX ADMIN — PANTOPRAZOLE SODIUM 40 MG: 40 TABLET, DELAYED RELEASE ORAL at 17:46

## 2019-03-15 RX ADMIN — ATORVASTATIN CALCIUM 40 MG: 40 TABLET, FILM COATED ORAL at 17:46

## 2019-03-15 RX ADMIN — METOPROLOL TARTRATE 50 MG: 50 TABLET, FILM COATED ORAL at 08:37

## 2019-03-15 RX ADMIN — OXYCODONE HYDROCHLORIDE 7.5 MG: 5 TABLET ORAL at 17:46

## 2019-03-15 RX ADMIN — DOXYCYCLINE 100 MG: 100 CAPSULE ORAL at 08:38

## 2019-03-15 RX ADMIN — OXYCODONE HYDROCHLORIDE 7.5 MG: 5 TABLET ORAL at 22:10

## 2019-03-15 RX ADMIN — NYSTATIN: 100000 POWDER TOPICAL at 08:39

## 2019-03-15 RX ADMIN — DILTIAZEM HYDROCHLORIDE 180 MG: 180 CAPSULE, COATED, EXTENDED RELEASE ORAL at 17:46

## 2019-03-15 RX ADMIN — METOPROLOL TARTRATE 50 MG: 50 TABLET, FILM COATED ORAL at 22:09

## 2019-03-15 RX ADMIN — DOXYCYCLINE 100 MG: 100 CAPSULE ORAL at 22:09

## 2019-03-15 RX ADMIN — OXYCODONE HYDROCHLORIDE 5 MG: 5 TABLET ORAL at 08:38

## 2019-03-15 RX ADMIN — Medication 400 MG: at 08:37

## 2019-03-15 RX ADMIN — MELATONIN 6 MG: at 22:09

## 2019-03-15 RX ADMIN — ASPIRIN 81 MG: 81 TABLET, COATED ORAL at 08:37

## 2019-03-15 RX ADMIN — SERTRALINE HYDROCHLORIDE 25 MG: 25 TABLET ORAL at 17:46

## 2019-03-15 NOTE — PLAN OF CARE
Problem: OCCUPATIONAL THERAPY ADULT  Goal: Performs self-care activities at highest level of function for planned discharge setting  See evaluation for individualized goals  Description  Treatment Interventions: ADL retraining, Functional transfer training, UE strengthening/ROM, Endurance training, Cognitive reorientation, Patient/family training, Equipment evaluation/education, Fine motor coordination activities, Compensatory technique education, Continued evaluation, Energy conservation, Activityengagement          See flowsheet documentation for full assessment, interventions and recommendations  Outcome: Progressing  Note:   Limitation: Decreased ADL status, Decreased Safe judgement during ADL, Decreased cognition, Decreased endurance, Decreased fine motor control, Decreased self-care trans, Decreased high-level ADLs, Decreased UE ROM, Decreased UE strength  Prognosis: Fair  Assessment: Patient participated in Skilled OT session this date with interventions consisting of safety awareness and fall prevention techniques,  COPING STRATEGIES, FMC, AND COGNITIVE REORIENTATION*, increase postural control, increase trunk control and increase OOB/ sitting tolerance   Patient agreeable to OT treatment session, upon arrival patient was found supine in bed  In comparison to previous session, patient with improvements in SITTING EOB TOLERANCE*   Patient requiring verbal cues for safety, verbal cues for correct technique and verbal cues for pacing thru activity steps  Patient continues to be functioning below baseline level, occupational performance remains limited secondary to factors listed above and increased risk for falls and injury  From OT standpoint, recommendation at time of d/c would be Short Term Rehab     Patient to benefit from continued Occupational Therapy treatment while in the hospital to address deficits as defined above and maximize level of functional independence with ADLs and functional mobility  Recommendation: (S) Other (comment)(MAY BENEFIT FROM NEUROPSYCH CONSULT)  OT Discharge Recommendation: Short Term Rehab  OT - OK to Discharge:  Yes

## 2019-03-15 NOTE — PROGRESS NOTES
Progress Note - Infectious Disease   Yue Martínez 60 y o  male MRN: [id number]  Unit/Bed#: [facility] 834-01 Encounter: [id number]      Impression/Plan:  1  MSSA bacteremia:  Possibility of endocarditis considered in the setting of bioprosthetic AVR  Kesha Choua no evidence of valvular vegetations   Initial portal of entry may have been related to multiple upper back wounds   CT chest/abdomen/pelvis with no other evidence of acute infection   Possible from pneumonia as sputum culture was positive for MSSA   Bacteremia eventually cleared   Podiatry evaluated patient's feet and no acute issues   Neurology evaluation noted with prior changes in mental status, imaging abnormalities felt to be ischemic and similar compared to prior studies   No plan for MRI   Patient improved significantly after recent GI bleed       -continue p o  Doxy as below  -patient completed antibiotic course for this issue on 03/10  -monitor temperature/WBC  -send blood cultures if patient spikes fever  -plan to repeat surveillance blood cultures on 03/22  -patient is stable for discharge from an ID standpoint  -will message office staff to follow-up on surveillance cultures      2  Rash and peripheral eosinophilia:  Patient recently developed rash  It was noted to be flat and pruritic   Absolute eosinophil count elevated   Likely due to amiodarone as the patient tolerated rechallenge with Ancef and there was no difference in the rash when patient was switched to vancomycin empirically   I suspect that this will be a prolonged elevation given the half-life of amiodarone   Patient again noted with rash and itching after transfer out of ICU   Antibiotic therapy changed only to vancomycin and the patient's absolute eosinophilia increased   Unfortunately with multiple drugs causing his elevation in eosinophilia make it difficult to determine the actual cause   His rash seems to have slightly improved and eosinophilia is down trending       -monitor WBC  -continue to trend fever curve/vitals  -continue  p o  doxy     3  Left forearm myositis/cellulitis:  Patient was noted to have large ecchymosis his left forearm after transition from the ICU   This was thought to be due to issues with obtaining access during his acute decline   The ecchymosis has largely resolved however the patient's arm remains swollen more so than his other extremities and painful   CT imaging of the arm was done and findings are notable for soft tissue edema and suggestion of myositis/cellulitis   No obvious collections seen though study limited without contrast   Vascular study with superficial thrombophlebitis in the arm   The patient fortunately remains hemodynamically stable with persistent leukocytosis   Repeat CT scan reviewed and surgical evaluation appreciated   Duplex study without acute change   The arm itself appears softer and less painful on subsequent exam   No culture data was obtained during this episode      -continue  p o  doxy  -continue to monitor CBC  -continue to trend fever curve/vitals  -serial exams  -ongoing supportive care as per primary   -treat for total of 14 days for possible myositis through 3/16  -patient is stable for discharge from an ID standpoint      4  History of bioprosthetic AVR   Secondary to degenerative AS   Placed in July 2014  Inessa Davila no evidence of endocarditis   Ongoing follow-up by Cardiology      5  Acute kidney injury   Likely ischemic/septic ATN    Patient remains on hemodialysis   He is status post PermCath placement      -ongoing follow-up by Nephrology  -antibiotics re-dose for intermittent HD      6  Leukocytosis:  WBC normalized  Calleen Failing continues to improve clinically   This may be due now to problem 3 and problem 2 may be contributing      -continue to monitor fever curve/vitals  -continue monitor CBC  -continue antibiotics as above     Above plan discussed in detail with patient   Patient planned for discharge to rehab tomorrow      ID consult service will sign off at this time  Please call if any additional questions or concerns  Antibiotics:  Doxycycline    24 hour events:  No acute events noted overnight on chart review  Patient is afebrile  White count 6 5  Patient's other vitals are stable    Subjective:  Patient seen at bedside this morning and currently denies having any nausea, vomiting, chest pain or shortness of breath  He continues to have on his arm but denies having any significant pain in his left arm  Complains of ongoing is pain in his feet bilaterally due to swelling  Objective:  Vitals:  Temp:  [98 1 °F (36 7 °C)-98 6 °F (37 °C)] 98 6 °F (37 °C)  HR:  [65-83] 67  Resp:  [18-20] 18  BP: (106-137)/(43-84) 129/68  SpO2:  [91 %-93 %] 93 %  Temp (24hrs), Av 3 °F (36 8 °C), Min:98 1 °F (36 7 °C), Max:98 6 °F (37 °C)  Current: Temperature: 98 6 °F (37 °C)    Physical Exam:   General Appearance:  Alert, interactive, nontoxic, no acute distress  Throat: Oropharynx moist without lesions  Lungs:   Clear to auscultation anteriorly bilaterally; no wheezes, rhonchi or rales; respirations unlabored   Heart:  RRR; no murmur, rub or gallop   Abdomen:   Soft, non-tender, non-distended, positive bowel sounds  Extremities: No clubbing, cyanosis; significant edema in the lower extremities bilaterally  Left arm swelling has improved significantly  Skin: No new rashes or lesions  No new draining wounds noted    Patient has scratch marks all over his body if he continues to itch       Labs, Imaging, & Other studies:   All pertinent labs and imaging studies were personally reviewed  Results from last 7 days   Lab Units 19  0931 19  0833 19  0458 19  0635   WBC Thousand/uL  --  6 51 8 98 8 41   HEMOGLOBIN g/dL 7 8* 6 3* 8 7* 9 1*   PLATELETS Thousands/uL  --  146* 147* 158     Results from last 7 days   Lab Units 03/15/19  0436   POTASSIUM mmol/L 4 0   CHLORIDE mmol/L 100   CO2 mmol/L 29 BUN mg/dL 24   CREATININE mg/dL 3 39*   EGFR ml/min/1 73sq m 19   CALCIUM mg/dL 7 8*

## 2019-03-15 NOTE — PLAN OF CARE
Problem: Potential for Falls  Goal: Patient will remain free of falls  Description  INTERVENTIONS:  - Assess patient frequently for physical needs  -  Identify cognitive and physical deficits and behaviors that affect risk of falls  -  [place] fall precautions as indicated by assessment   - Educate patient/family on patient safety including physical limitations  - Instruct patient to call for assistance with activity based on assessment  - Modify environment to reduce risk of injury  - Consider OT/PT consult to assist with strengthening/mobility    Outcome: Progressing     Problem: Prexisting or High Potential for Compromised Skin Integrity  Goal: Skin integrity is maintained or improved  Description  INTERVENTIONS:  - Identify patients at risk for skin breakdown  - Assess and monitor skin integrity  - Assess and monitor nutrition and hydration status  - Monitor labs (i e  albumin)  - Assess for incontinence   - Turn and reposition patient  - Assist with mobility/ambulation  - Relieve pressure over bony prominences  - Avoid friction and shearing  - Provide appropriate hygiene as needed including keeping skin clean and dry  - Evaluate need for skin moisturizer/barrier cream  - Collaborate with interdisciplinary team (i e  Nutrition, Rehabilitation, etc )   - Patient/family teaching   Outcome: Progressing     Problem: Nutrition/Hydration-ADULT  Goal: Nutrient/Hydration intake appropriate for improving, restoring or maintaining nutritional needs  Description  Monitor and assess patient's nutrition/hydration status for malnutrition (ex- brittle hair, bruises, dry skin, pale skin and conjunctiva, muscle wasting, smooth red tongue, and disorientation)  Collaborate with interdisciplinary team and initiate plan and interventions as ordered  Monitor patient's weight and dietary intake as ordered or per policy  Utilize nutrition screening tool and intervene per policy   Determine patient's food preferences and provide high-protein, high-caloric foods as appropriate  INTERVENTIONS:  - Monitor oral intake, urinary output, labs, and treatment plans  - Assess nutrition and hydration status and recommend course of action  - Evaluate amount of meals eaten  - Assist patient with eating if necessary   - Allow adequate time for meals  - Recommend/ encourage appropriate diets, oral nutritional supplements, and vitamin/mineral supplements  - Order, calculate, and assess calorie counts as needed  - Recommend, monitor, and adjust tube feedings and TPN/PPN based on assessed needs  - Assess need for intravenous fluids  - Provide specific nutrition/hydration education as appropriate  - Include patient/family/caregiver in decisions related to nutrition   Outcome: Progressing     Problem: CARDIOVASCULAR - ADULT  Goal: Maintains optimal cardiac output and hemodynamic stability  Description  INTERVENTIONS:  - Monitor I/O, vital signs and rhythm  - Monitor for S/S and trends of decreased cardiac output i e  bleeding, hypotension  - Administer and titrate ordered vasoactive medications to optimize hemodynamic stability  - Assess quality of pulses, skin color and temperature  - Assess for signs of decreased coronary artery perfusion - ex   Angina  - Instruct patient to report change in severity of symptoms   Outcome: Progressing  Goal: Absence of cardiac dysrhythmias or at baseline rhythm  Description  INTERVENTIONS:  - Continuous cardiac monitoring, monitor vital signs, obtain 12 lead EKG if indicated  - Administer antiarrhythmic and heart rate control medications as ordered  - Monitor electrolytes and administer replacement therapy as ordered   Outcome: Progressing     Problem: METABOLIC, FLUID AND ELECTROLYTES - ADULT  Goal: Electrolytes maintained within normal limits  Description  INTERVENTIONS:  - Monitor labs and assess patient for signs and symptoms of electrolyte imbalances  - Administer electrolyte replacement as ordered  - Monitor response to electrolyte replacements, including repeat lab results as appropriate  - Instruct patient on fluid and nutrition as appropriate   Outcome: Progressing  Goal: Fluid balance maintained  Description  INTERVENTIONS:  - Monitor labs and assess for signs and symptoms of volume excess or deficit  - Monitor I/O and WT  - Instruct patient on fluid and nutrition as appropriate   Outcome: Progressing     Problem: PAIN - ADULT  Goal: Verbalizes/displays adequate comfort level or baseline comfort level  Description  Interventions:  - Encourage patient to monitor pain and request assistance  - Assess pain using appropriate pain scale  - Administer analgesics based on type and severity of pain and evaluate response  - Implement non-pharmacological measures as appropriate and evaluate response  - Consider cultural and social influences on pain and pain management  - Notify physician/advanced practitioner if interventions unsuccessful or patient reports new pain   Outcome: Progressing     Problem: INFECTION - ADULT  Goal: Absence or prevention of progression during hospitalization  Description  INTERVENTIONS:  - Assess and monitor for signs and symptoms of infection  - Monitor lab/diagnostic results  - Monitor all insertion sites, i e  indwelling lines, tubes, and drains  - Monitor endotracheal (as able) and nasal secretions for changes in amount and color  - [place] appropriate cooling/warming therapies per order  - Administer medications as ordered  - Instruct and encourage patient and family to use good hand hygiene technique  - Identify and instruct in appropriate isolation precautions for identified infection/condition    Outcome: Progressing     Problem: SAFETY ADULT  Goal: Patient will remain free of falls  Description  INTERVENTIONS:  - Assess patient frequently for physical needs  -  Identify cognitive and physical deficits and behaviors that affect risk of falls    -  [place] fall precautions as indicated by assessment   - Educate patient/family on patient safety including physical limitations  - Instruct patient to call for assistance with activity based on assessment  - Modify environment to reduce risk of injury  - Consider OT/PT consult to assist with strengthening/mobility    Outcome: Progressing  Goal: Maintain or return to baseline ADL function  Description  INTERVENTIONS:  -  Assess patient's ability to carry out ADLs; assess patient's baseline for ADL function and identify physical deficits which impact ability to perform ADLs (bathing, care of mouth/teeth, toileting, grooming, dressing, etc )  - Assess/evaluate cause of self-care deficits   - Assess range of motion  - Assess patient's mobility; develop plan if impaired  - Assess patient's need for assistive devices and provide as appropriate  - Encourage maximum independence but intervene and supervise when necessary  ¯ Involve family in performance of ADLs  ¯ Assess for home care needs following discharge   ¯ Request OT consult to assist with ADL evaluation and planning for discharge  ¯ Provide patient education as appropriate    Outcome: Progressing  Goal: Maintain or return mobility status to optimal level  Description  INTERVENTIONS:  - Assess patient's baseline mobility status (ambulation, transfers, stairs, etc )    - Identify cognitive and physical deficits and behaviors that affect mobility  - Identify mobility aids required to assist with transfers and/or ambulation (gait belt, sit-to-stand, lift, walker, cane, etc )  - [place] fall precautions as indicated by assessment  - Record patient progress and toleration of activity level on Mobility SBAR; progress patient to next Phase/Stage  - Instruct patient to call for assistance with activity based on assessment  - Request Rehabilitation consult to assist with strengthening/weightbearing, etc     Outcome: Progressing     Problem: DISCHARGE PLANNING  Goal: Discharge to home or other facility with appropriate resources  Description  INTERVENTIONS:  - Identify barriers to discharge w/patient and caregiver  - Arrange for needed discharge resources and transportation as appropriate  - Identify discharge learning needs (meds, wound care, etc )  - Arrange for interpretive services to assist at discharge as needed  - Refer to Case Management Department for coordinating discharge planning if the patient needs post-hospital services based on physician/advanced practitioner order or complex needs related to functional status, cognitive ability, or social support system   Outcome: Progressing     Problem: Knowledge Deficit  Goal: Patient/family/caregiver demonstrates understanding of disease process, treatment plan, medications, and discharge instructions  Description  Complete learning assessment and assess knowledge base    Interventions:  - Provide teaching at level of understanding  - Provide teaching via preferred learning methods   Outcome: Progressing     Problem: GENITOURINARY - ADULT  Goal: Maintains or returns to baseline urinary function  Description  INTERVENTIONS:  - Assess urinary function  - Encourage oral fluids to ensure adequate hydration  - Administer IV fluids as ordered to ensure adequate hydration  - Administer ordered medications as needed  - Offer frequent toileting  - Follow urinary retention protocol if ordered   Outcome: Progressing  Goal: Absence of urinary retention  Description  INTERVENTIONS:  - Assess patient?s ability to void and empty bladder  - Monitor I/O  - Bladder scan as needed  - Discuss with physician/AP medications to alleviate retention as needed  - Discuss catheterization for long term situations as appropriate   Outcome: Progressing     Problem: DISCHARGE PLANNING - CARE MANAGEMENT  Goal: Discharge to post-acute care or home with appropriate resources  Description  INTERVENTIONS:  - Conduct assessment to determine patient/family and health care team treatment goals, and need for post-acute services based on payer coverage, community resources, and patient preferences, and barriers to discharge  - Address psychosocial, clinical, and financial barriers to discharge as identified in assessment in conjunction with the patient/family and health care team  - Arrange appropriate level of post-acute services according to patient's   needs and preference and payer coverage in collaboration with the physician and health care team  - Communicate with and update the patient/family, physician, and health care team regarding progress on the discharge plan  - Arrange appropriate transportation to post-acute venues  - Pt to d/c with appropriate resources when medically stable     Outcome: Progressing

## 2019-03-15 NOTE — SOCIAL WORK
CMN form faxed to [phone] as patient will also need auth for non-emergent BLS transport  Awaiting determination  Call received from [facility] with [employer] that Loyd Bird was confirmed for [facility] for tomorrow  SNF auth # [id number] was approved for 7 days with review due to Kylah Clark (Phone [phone] and Fax [phone])  Cm informed [facility] of same information  Transport set for [facility] (CMN faxed to [facility] on 3/14/19) to [place] after HD treatment  Cm informed RN Ulisses Vargas, Dr Fede Bianchi, patient, and left voicemail for patient's wife Margot Prasad  All aware and in agreement with discharge plan

## 2019-03-15 NOTE — SOCIAL WORK
CM confirmed that patient will be receiving dialysis at [facility]  Cm also left a voicemail for [place] [phone] regarding authorization status  Cm to continue following

## 2019-03-15 NOTE — DISCHARGE INSTRUCTIONS
Perma-cath Placement   WHAT YOU NEED TO KNOW:   A perma-cath is a catheter placed through a vein into or near your right atrium  Your right atrium is the right upper chamber of your heart  A perma-cath is used for dialysis in an emergency or until a long-term device is ready to use  After your procedure, you will have some pain and swelling on your chest and neck  You may have some bruises on your chest and neck  You may also have 2 dressings, one on your chest and one on your neck  DISCHARGE INSTRUCTIONS:   Call 911 for any of the following:   · You feel lightheaded, short of breath, and have chest pain  · Your catheter comes out   Contact Interventional Radiology at [phone] [place] PATIENTS: Contact Interventional Radiology at [phone]) [place]óme[place] PATIENTS: Contact Interventional Radiology at [phone]) if:  · Blood soaks through your bandage  · You have new swelling in your arm, neck, face, or chest on your right side  · Your catheter gets wet  · Your bruises or pain get worse  · You have a fever or chills  · Persistent nausea or vomiting  · Your incision is red, swollen, or draining pus  · You have questions or concerns about your condition or care  Self-care:       · Resume your normal diet  · Keep your dressings dry  Do not take a shower or swim  You may take a tub bath, but do not get your dressings wet  Water in your wound can cause bacteria to grow and cause an infection  If your dressing gets wet, dry it off and cover it with dry sterile gauze  Call your healthcare provider  Do not use soaps or ointments  · Do not change your dressings  Your healthcare provider or dialysis nurse will change your dressings  Your dressings should stay in place until your healthcare provider removes them  The dressing on your chest will stay as long as you have the catheter in place  The dressing prevents infection  · Do not remove the red and blue caps from the end of your catheter   The caps prevent air from getting into your catheter    Follow up with your healthcare provider as directed: Write down your questions so you remember to ask them during your visits

## 2019-03-15 NOTE — SOCIAL WORK
Per Lorenakaitlin Godfrey [phone]), authorization was not submitted by [facility]  MARCIA submitted for authorization to [facility] [address] [place] [address] P: [phone] F: [phone])  Marcia to continue following

## 2019-03-15 NOTE — PHYSICAL THERAPY NOTE
PHYSICAL THERAPY TREATMENT NOTE    Patient Name: Maryjo Alfredo  AOPSL'X Date: 3/15/2019     03/15/19 1034   Pain Assessment   Pain Assessment FLACC   Pain Rating: FLACC (Rest) - Face 0   Pain Rating: FLACC (Rest) - Legs 0   Pain Rating: FLACC (Rest) - Activity 0   Pain Rating: FLACC (Rest) - Cry 1   Pain Rating: FLACC (Rest) - Consolability 0   Score: FLACC (Rest) 1   Pain Rating: FLACC (Activity) - Face 1   Pain Rating: FLACC (Activity) - Legs 1   Pain Rating: FLACC (Activity) - Activity 0   Pain Rating: FLACC (Activity) - Cry 1   Pain Rating: FLACC (Activity) - Consolability 1   Score: FLACC (Activity) 4   Restrictions/Precautions   Braces or Orthoses   (foam sling LUE)   Other Precautions Cognitive;Telemetry; Fall Risk;Pain   General   Chart Reviewed Yes   Family/Caregiver Present No   Cognition   Overall Cognitive Status Impaired   Arousal/Participation Cooperative   Attention Attends with cues to redirect   Comments Pt identified by name and   Subjective   Subjective Pt agrees to PT treatment after encouragement  "You guys are my friends " "I'm glad I sat up with you "   Bed Mobility   Supine to Sit 2  Maximal assistance   Additional items Assist x 2;HOB elevated; Bedrails; Increased time required;Verbal cues;LE management   Sit to Supine 2  Maximal assistance   Additional items Assist x 2; Increased time required;Verbal cues;LE management   Additional Comments Pt sat EOB with supervision and verbal/tactile cues for approximately 25`  Pt performed table top activities as well as LE A/AAROM and gentle stretching  Balance   Static Sitting Fair   Dynamic Sitting Poor   Endurance Deficit   Endurance Deficit Yes   Endurance Deficit Description limited sitting tolerance/extremity fatigue   Activity Tolerance   Activity Tolerance Patient limited by fatigue   Nurse Made Aware Per RN, pt appropriate to treat  Exercises   Knee AROM Long Arc Quad Sitting;10 reps;AAROM; Bilateral   Ankle Pumps Sitting;10 reps;AAROM; Bilateral;PROM  (gentle stretching into dorsiflexion)   Marching Sitting;5 reps;AAROM; Bilateral   Assessment   Prognosis Fair   Problem List Decreased strength;Decreased range of motion;Decreased endurance; Impaired balance;Decreased mobility; Decreased cognition; Impaired judgement;Obesity; Decreased skin integrity;Pain   Assessment Pt tolerated treatment well and is progressing with sitting tolerance  Continues to require max A x2 for bed mobility, however able to maintain sitting balance on EOB with supervision while performing UE tasks  BLE gentle stretching performed in sitting to allow for greater A/AAROM  Pt requires max vc for full active participation due to cognition  Pt is pleasant and cooperative after encouragement  Will continue to benefit from ongoing skilled PT to maximize his functional mobility and increase his level of independence  Recommending rehab at time of discharge when medically appropriate  Goals   Patient Goals Pt would like his back scratched  STG Expiration Date 03/22/19   Treatment Day 7   Plan   Treatment/Interventions Functional transfer training;LE strengthening/ROM; Therapeutic exercise; Endurance training;Patient/family training;Equipment eval/education   Progress Slow progress, cognitive deficits   PT Frequency   (3-5x/week)   Recommendation   Recommendation Short-term skilled PT   Equipment Recommended   (TBD)   Yoandy Lund, PT,DPT

## 2019-03-15 NOTE — PLAN OF CARE
Problem: PHYSICAL THERAPY ADULT  Goal: Performs mobility at highest level of function for planned discharge setting  See evaluation for individualized goals  Description  Treatment/Interventions: LE strengthening/ROM, Therapeutic exercise, Endurance training, Patient/family training, Cognitive reorientation, Equipment eval/education, Bed mobility, Spoke to advanced practitioner, Spoke to case management, Spoke to nursing  Equipment Recommended:  (TBD- may benefit from [facility] bed)       See flowsheet documentation for full assessment, interventions and recommendations  Outcome: Progressing  Note:   Prognosis: Fair  Problem List: Decreased strength, Decreased range of motion, Decreased endurance, Impaired balance, Decreased mobility, Decreased cognition, Impaired judgement, Obesity, Decreased skin integrity, Pain  Assessment: Pt tolerated treatment well and is progressing with sitting tolerance  Continues to require max A x2 for bed mobility, however able to maintain sitting balance on EOB with supervision while performing UE tasks  BLE gentle stretching performed in sitting to allow for greater A/AAROM  Pt requires max vc for full active participation due to cognition  Pt is pleasant and cooperative after encouragement  Will continue to benefit from ongoing skilled PT to maximize his functional mobility and increase his level of independence  Recommending rehab at time of discharge when medically appropriate  Barriers to Discharge: Inaccessible home environment     Recommendation: Short-term skilled PT     PT - OK to Discharge: (S) Yes(to rehab when med [place])    See flowsheet documentation for full assessment

## 2019-03-15 NOTE — OCCUPATIONAL THERAPY NOTE
[address] Progress Note     Patient Name: Georges Recio  EUXMM'D Date: 3/15/2019  Problem List  Patient Active Problem List   Diagnosis    Aortic stenosis, mild    Essential hypertension    Hyperlipidemia    Left bundle branch block    Murmur    Osteoarthritis of both knees    Pericardial disease    Sciatica    Age-related cataract of right eye    Venous insufficiency    Bilateral leg edema    Stress-induced cardiomyopathy    History of aortic valve replacement with bioprosthetic valve    Persistent atrial fibrillation (HCC)    MSSA bacteremia - Resolved septic shock    Acute blood loss anemia - Gastric ulcer    Leukocytosis    Cerebrovascular accident ([address] )    Acute metabolic encephalopathy    Skin rash    Acute renal failure    GI bleed    Coagulopathy (HCC)    GI bleeding    Age-related nuclear cataract, bilateral    Open angle with borderline findings, low risk, bilateral    Presence of intraocular lens    Vitreous degeneration of both eyes    Left arm swelling    Deep tissue injury    Dysphagia    Cellulitis/myositis of left forearm    Left internal jugular vein thrombus    Morbid obesity     Depression           03/15/19 1033   Restrictions/Precautions   Weight Bearing Precautions Per Order Yes   LUE Weight Bearing Per Order   (SLING)   General   Response to Previous Treatment Patient with no complaints from previous session   Lifestyle   Autonomy I ADLS, IADLS, transfers and functional mobility PTA   Reciprocal Relationships Pt lives w/ spouse and mother in law   Service to Others Pt works full time as a     Intrinsic Gratification pt likes trains   Pain Assessment   Pain Assessment FLACC   Pain Rating: FLACC (Rest) - Face 0   Pain Rating: FLACC (Rest) - Legs 0   Pain Rating: FLACC (Rest) - Activity 0   Pain Rating: FLACC (Rest) - Cry 1   Pain Rating: FLACC (Rest) - Consolability 0   Score: FLACC (Rest) 1   Pain Rating: FLACC (Activity) - Face 1   Pain Rating: FLACC (Activity) - Legs 1   Pain Rating: FLACC (Activity) - Activity 0   Pain Rating: FLACC (Activity) - Cry 1   Pain Rating: FLACC (Activity) - Consolability 1   Score: FLACC (Activity) 4   ADL   UB Dressing Assistance 4  Minimal Assistance   UB Dressing Deficit Thread RUE; Thread LUE; Increased time to complete   Bed Mobility   Supine to Sit 2  Maximal assistance   Additional items Assist x 2; Increased time required   Sit to Supine 2  Maximal assistance   Additional items Assist x 2; Increased time required   Additional Comments SAT OOB FOR 25 MINUTES W/ CLOSE SUPERVISION   Cognition   Overall Cognitive Status Impaired   Arousal/Participation Cooperative   Attention Attends with cues to redirect   Orientation Level Oriented to person;Oriented to place; Disoriented to time;Disoriented to situation   Memory Decreased recall of precautions;Decreased recall of recent events;Decreased short term memory   Following Commands Follows one step commands with increased time or repetition   Comments TRAIN CROSSWORD COMPLETED W/ MOD VC FROM THERAPIST   Additional Activities   Additional Activities Comments COPING STRATEGIES EXAMINED: OPENED 4 ENVELOPES W/ MIN A AND READ CARDS FROM FRIENDS/FAMILY   Activity Tolerance   Activity Tolerance Patient limited by fatigue;Patient limited by pain   Medical Staff Made Aware PT [id number] PRESENT   Assessment   Assessment Patient participated in Skilled OT session this date with interventions consisting of safety awareness and fall prevention techniques,  COPING STRATEGIES, FMC, AND COGNITIVE REORIENTATION*, increase postural control, increase trunk control and increase OOB/ sitting tolerance   Patient agreeable to OT treatment session, upon arrival patient was found supine in bed  In comparison to previous session, patient with improvements in SITTING EOB TOLERANCE*    Patient requiring verbal cues for safety, verbal cues for correct technique and verbal cues for pacing thru activity steps  Patient continues to be functioning below baseline level, occupational performance remains limited secondary to factors listed above and increased risk for falls and injury  From OT standpoint, recommendation at time of d/c would be Short Term Rehab  Patient to benefit from continued Occupational Therapy treatment while in the hospital to address deficits as defined above and maximize level of functional independence with ADLs and functional mobility  Plan   Treatment Interventions Endurance training;Cognitive reorientation;Patient/family training;Fine motor coordination activities; Compensatory technique education; Energy conservation; Activityengagement   Goal Expiration Date 03/27/19   Treatment Day 3   OT Frequency 3-5x/wk   Recommendation   OT Discharge Recommendation Short Term Rehab   OT - OK to Discharge Yes     Maryanne Schirmer, MS, OTR/L

## 2019-03-15 NOTE — PROGRESS NOTES
[address] Internal Medicine Progress Note  Patient: Gisela Leary 60 y o  male   MRN: [id number]  PCP: Theo Chaudhari DO  Unit/Bed#: [facility] 834-01 Encounter: [id number]  Date Of Visit: 03/15/19    Assessment:    Principal Problem:    MSSA bacteremia - Resolved septic shock  Active Problems:    Essential hypertension    Stress-induced cardiomyopathy    History of aortic valve replacement with bioprosthetic valve    Persistent atrial fibrillation (HCC)    Acute blood loss anemia - Gastric ulcer    Cerebrovascular accident ([address] )    Acute metabolic encephalopathy    Skin rash    Acute renal failure    GI bleed    Left arm swelling    Deep tissue injury    Dysphagia    Cellulitis/myositis of left forearm    Left internal jugular vein thrombus    Morbid obesity     Depression       Plan:    1  MSSA bacteremia, negative CHON, completed 6 weeks of IV antibiotic   2  Recent shock, multifactorial,  cardiogenic/septic, resolved  3  PATRICK secondary to ATN, no sign of renal recovery, has been dialysis dependent, on HD  TTS, nephrology is following  4  Acute systolic CHF/stress cardiomyopathy, improving EF from 30 to 55%,   5  S/p bio AVR, negative CHON for vegetation  6  Acute hypoxic respiratory failure secondary to above, resolved,  s/p extubation, continue supportive care  7  New onset AFib/ A flutter, on Cardizem and Lopressor, per Cardiology,  no chronic anticoagulation due to bleeding risk, he may be a candidate for Watchman device in the future   8  Toxic metabolic encephalopathy with likely bilateral ischemic CVA, negative carotid ultrasound, repeat head CT scan reviewed, continue aspirin and statin   9  Acute blood loss anemia secondary to esophagitis, gastric ulcer and ischemic colitis, per GI plan to repeat EGD in 6-8 weeks,  continue PPI  10  Left forearm myositis/cellulitis, skin rash with peripheral eosinophilia, switched to oral antibiotic through 03/16, ID is following  11   Left IJ DVT with superficial thrombophlebitis, likely provoked by central line, due to risk of bleeding cardiology recommending no AC, continue aspirin   12  Morbid obesity  13  Depression, continue Zoloft    Plan to discharge to rehab tomorrow    VTE Pharmacologic Prophylaxis:   Pharmacologic: Heparin  Mechanical VTE Prophylaxis in Place: Yes    Patient Centered Rounds: I have performed bedside rounds with nursing staff today  Discussions with Specialists or Other Care Team Provider:     Education and Discussions with Family / Patient:  Patient    Time Spent for Care: 30 minutes  More than 50% of total time spent on counseling and coordination of care as described above  Current Length of Stay: 50 day(s)    Current Patient Status: Inpatient   Certification Statement: The patient will continue to require additional inpatient hospital stay due to Management of cellulitis    Discharge Plan / Estimated Discharge Date:  Awaiting rehab placement tomorrow    Code Status: Level 1 - Full Code      Subjective:   Patient seen and examined  Comfortable in bed  No event overnight        Objective:     Vitals:   Temp (24hrs), Av 3 °F (36 8 °C), Min:98 1 °F (36 7 °C), Max:98 6 °F (37 °C)    Temp:  [98 1 °F (36 7 °C)-98 6 °F (37 °C)] 98 6 °F (37 °C)  HR:  [65-83] 67  Resp:  [18-20] 18  BP: (106-137)/(43-84) 129/68  SpO2:  [91 %-93 %] 93 %  Body mass index is 51 37 kg/m²  Input and Output Summary (last 24 hours):        Intake/Output Summary (Last 24 hours) at 3/15/2019 1150  Last data filed at 3/15/2019 0800  Gross per 24 hour   Intake 360 ml   Output 2451 ml   Net -2091 ml       Physical Exam:     Physical Exam     Patient is awake alert in no acute distress  Lung clear to auscultation bilateral anteriorly  Heart positive S1-S2 irregular no murmur  Abdomen soft obese nontender  Lower extremities b/l pitting edema    Additional Data:     Labs:    Results from last 7 days   Lab Units 19  0931 19  0833 19  0458   WBC Thousand/uL --  6 51 8 98   HEMOGLOBIN g/dL 7 8* 6 3* 8 7*   HEMATOCRIT % 25 4* 20 4* 28 1*   PLATELETS Thousands/uL  --  146* 147*   NEUTROS PCT %  --   --  69   LYMPHS PCT %  --   --  7*   MONOS PCT %  --   --  6   EOS PCT %  --   --  16*     Results from last 7 days   Lab Units 03/15/19  0436   POTASSIUM mmol/L 4 0   CHLORIDE mmol/L 100   CO2 mmol/L 29   BUN mg/dL 24   CREATININE mg/dL 3 39*   CALCIUM mg/dL 7 8*           * I Have Reviewed All Lab Data Listed Above  * Additional Pertinent Lab Tests Reviewed:  [address] Admission Reviewed    Imaging:    Imaging Reports Reviewed Today Include:   Imaging Personally Reviewed by Myself Includes:     Recent Cultures (last 7 days):           Last 24 Hours Medication List:     Current Facility-Administered Medications:  acetaminophen 650 mg Oral Q6H PRN KATHY Maravilla   aspirin 81 mg Oral Daily Zurdo Montelongo, DO   atorvastatin 40 mg Oral Daily With Delbert Fox, [address]   b complex-vitamin C-folic acid 1 capsule Oral Daily With Loly Cowart MD   bisacodyl 10 mg Rectal Daily PRN KATHY Maravilla   diltiazem 180 mg Oral Daily Eve Hall MD   doxycycline hyclate 100 mg Oral Q12H [facility] Clif Rod MD   heparin (porcine) 5,000 Units Subcutaneous [facility] Zurdo Erwin, DO   hydrocortisone  Topical BID PRN Zurdo Montelongo, DO   magnesium oxide 400 mg Oral Daily Dayanara Ortega MD   melatonin 6 mg Oral HS Leidy Martinez PA-C   metoprolol 5 mg Intravenous Q6H PRN Dony Rider MD   metoprolol tartrate 50 mg Oral TID Dayanara Ortega MD   midodrine 10 mg Oral Before Dialysis Dayanara Ortega MD   nystatin  Topical BID KATHY Maravilla   oxyCODONE 5 mg Oral Q4H PRN Dony Rider MD   oxyCODONE 7 5 mg Oral Q4H PRN Dony Rider MD   pantoprazole 40 mg Oral BID AC Suresh Eckert PA-C   polyvinyl alcohol 1 drop Both Eyes Q3H PRN KATHY Maravilla   sertraline 25 mg Oral After Joy Rader MD        Today, Patient Was Seen By: Mercedes Pike DO Twan    ** Please Note: This note has been constructed using a voice recognition system   **

## 2019-03-15 NOTE — PROGRESS NOTES
NEPHROLOGY PROGRESS NOTE   Santy Wasserman 60 y o  male MRN: [id number]  Unit/Bed#: [facility] 834-01 Encounter: [id number]  Reason for Consult: ARF    ASSESSMENT and PLAN:    Patient is a 78-year-old male with a history of hypertension, obesity/bioprosthetic aortic valve replacement who presented with shock and AK I from [facility]:  His course was complicated by MSSA bacteremia/PATRICK/GI bleeding/atrial fibrillation:  We are asked to follow for PATRICK on CKD now hemodialysis dependence after initial CRRT     1 ) Acute Renal Failure/AcuteTubular Necrosis  - Present on admission (POA) ; admission creatinine:  2 63  - Urinalysis:  From January 2019 was negative for glucose and ketones, large blood, positive nitrate, negative leukocytes, pH of 5 5, 3+ protein, innumerable red blood cells as well as innumerable white blood cells  - Imaging:  CT scan from February 22nd showed no evidence of hydronephrosis  - Serologies: n/a  - Access:  Right IJ PermCath  - Etiology:  Most likely in the setting ischemic/septic acute tubular necrosis leading to renal failure that is currently dialysis dependent   Cannot completely rule out acute interstitial nephritis given the peripheral Hector Nguyễn is currently dialysis dependent since 2/1/2019   His creatinine continues to trend off dialysis  Lowella Boeck is no urine output documented   Will continue to monitor postvoid residuals  - Status:  Remains dialysis dependent   Has been on dialysis since 2/1/2019   Creatinine continues to trend up off dialysis  - Plan:                 - plan for next dialysis tomorrow                - no indication for dialysis today     2  ) Blood pressure/volume status  -most recent echocardiogram shows ejection fraction of 55%, history of systolic congestive heart failure  -he still has a good deal of edema  -plan for next dialysis tomorrow  -chest x-ray shows some mild pulmonary vascular congestion, breathing feels better today after dialysis yesterday     3 ) MSSA bacteremia  -CHON unremarkable  -completed 6 weeks of intravenous antibiotics     4  ) Hyponatremia --> resolved  -mild, likely volume mediated, stable  -will monitor with ultrafiltration on dialysis  -sodium level 135 today     5  ) Anemia  -hemoglobin yesterday was 6 3 has improved to 7 8      SUBJECTIVE / INTERVAL HISTORY:    Breathing much feels much better today    OBJECTIVE:  Current Weight: Weight - Scale: (!) 158 kg (347 lb 14 2 oz)  Vitals:    03/14/19 2137 03/14/19 2255 03/15/19 0600 03/15/19 0700   BP: 136/84 106/59  129/68   BP Location:  Right arm  Right arm   Pulse: 65 67  67   Resp:  20  18   Temp:  98 1 °F (36 7 °C)  98 6 °F (37 °C)   TempSrc:  Oral  Oral   SpO2:  93%  93%   Weight:   (!) 158 kg (347 lb 14 2 oz)    Height:           Intake/Output Summary (Last 24 hours) at 3/15/2019 1235  Last data filed at 3/15/2019 0800  Gross per 24 hour   Intake 60 ml   Output 0 ml   Net 60 ml       Review of Systems:    12 point ROS has been reviewed  Physical Exam   Constitutional: He is oriented to person, place, and time  He appears well-developed and well-nourished  No distress  HENT:   Head: Normocephalic and atraumatic  Eyes: Pupils are equal, round, and reactive to light  No scleral icterus  Neck: Normal range of motion  Neck supple  Cardiovascular: Normal rate, regular rhythm and normal heart sounds  Exam reveals no gallop and no friction rub  No murmur heard  Pulmonary/Chest: Effort normal and breath sounds normal  No respiratory distress  He has no wheezes  He has no rales  He exhibits no tenderness  Abdominal: Soft  Bowel sounds are normal  He exhibits no distension  There is no tenderness  There is no rebound  Musculoskeletal: Normal range of motion  He exhibits edema  Neurological: He is alert and oriented to person, place, and time  Skin: No rash noted  He is not diaphoretic  Psychiatric: He has a normal mood and affect     Nursing note and vitals reviewed        Medications:    Current Facility-Administered Medications:     acetaminophen (TYLENOL) tablet 650 mg, 650 mg, Oral, Q6H PRN, KATHY Lugo, 650 mg at 03/13/19 2016    aspirin (ECOTRIN LOW STRENGTH) EC tablet 81 mg, 81 mg, Oral, Daily, Cathryne Sicard, DO, 81 mg at 03/15/19 0837    atorvastatin (LIPITOR) tablet 40 mg, 40 mg, Oral, Daily With Dinner, KATHY Contreras, 40 mg at 03/14/19 1802    b complex-vitamin C-folic acid (NEPHROCAPS) capsule 1 capsule, 1 capsule, Oral, Daily With Benton Johnson MD, 1 capsule at 03/14/19 1801    bisacodyl (DULCOLAX) rectal suppository 10 mg, 10 mg, Rectal, Daily PRN, KATHY Contreras    diltiazem (CARDIZEM CD) 24 hr capsule 180 mg, 180 mg, Oral, Daily, Michaela Guaman MD, 180 mg at 03/14/19 1802    doxycycline hyclate (VIBRAMYCIN) capsule 100 mg, 100 mg, Oral, Q12H [address], Cynthia Gtz MD, 100 mg at 03/15/19 0838    heparin (porcine) subcutaneous injection 5,000 Units, 5,000 Units, Subcutaneous, Q8H [address], Cathryne Sicard, DO, 5,000 Units at 03/15/19 0614    hydrocortisone 1 % cream, , Topical, BID PRN, Zach Sicard, DO    magnesium oxide (MAG-OX) tablet 400 mg, 400 mg, Oral, Daily, Kavya Acosta MD, 400 mg at 03/15/19 0837    melatonin tablet 6 mg, 6 mg, Oral, HS, Leidy Martinez PA-C, 6 mg at 03/14/19 2137    metoprolol (LOPRESSOR) injection 5 mg, 5 mg, Intravenous, Q6H PRN, Yasmin Silverman MD, 5 mg at 03/02/19 1150    metoprolol tartrate (LOPRESSOR) tablet 50 mg, 50 mg, Oral, TID, Kavya Acosta MD, 50 mg at 03/15/19 0837    midodrine (PROAMATINE) tablet 10 mg, 10 mg, Oral, Before Dialysis, Kavya Acosta MD, 10 mg at 03/09/19 1023    nystatin (MYCOSTATIN) powder, , Topical, BID, KATHY Lugo    oxyCODONE (ROXICODONE) IR tablet 5 mg, 5 mg, Oral, Q4H PRN, Yasmin iSlverman MD, 5 mg at 03/15/19 0838    oxyCODONE (ROXICODONE) IR tablet 7 5 mg, 7 5 mg, Oral, Q4H PRN, Yasmin Silverman MD, 7 5 mg at 03/14/19 1041    pantoprazole (PROTONIX) EC tablet 40 mg, 40 mg, Oral, BID AC, Suresh Eckert PA-C, 40 mg at 03/15/19 9309    polyvinyl alcohol (LIQUIFILM TEARS) 1 4 % ophthalmic solution 1 drop, 1 drop, Both Eyes, Q3H PRN, KATHY Contreras, 1 drop at 03/05/19 0603    sertraline (ZOLOFT) tablet 25 mg, 25 mg, Oral, After Radha Spence MD, 25 mg at 03/14/19 1802    Laboratory Results:  Results from last 7 days   Lab Units 03/15/19  0436 03/14/19  0931 03/14/19  0833 03/12/19  0458 03/11/19  0635 03/10/19  0705 03/09/19  0937 03/09/19  0935   WBC Thousand/uL  --   --  6 51 8 98 8 41  --  10 33*  --    HEMOGLOBIN g/dL  --  7 8* 6 3* 8 7* 9 1*  --  8 0*  --    HEMATOCRIT %  --  25 4* 20 4* 28 1* 29 9*  --  26 4*  --    PLATELETS Thousands/uL  --   --  146* 147* 158  --  135*  --    POTASSIUM mmol/L 4 0  --  3 7 4 5 3 9 4 0  --  3 9   CHLORIDE mmol/L 100  --  101 98* 96* 98*  --  98*   CO2 mmol/L 29  --  32 27 31 28  --  28   BUN mg/dL 24  --  25 43* 34* 22  --  35*   CREATININE mg/dL 3 39*  --  3 14* 4 86* 4 24* 3 37*  --  4 40*   CALCIUM mg/dL 7 8*  --  6 8* 7 4* 7 7* 7 5*  --  7 4*   MAGNESIUM mg/dL  --   --   --  2 0 1 9 1 9  --  1 8

## 2019-03-16 ENCOUNTER — APPOINTMENT (INPATIENT)
Dept: DIALYSIS | Facility: HOSPITAL | Age: 59
DRG: 871 | End: 2019-03-16
Attending: INTERNAL MEDICINE
Payer: COMMERCIAL

## 2019-03-16 VITALS
HEART RATE: 68 BPM | TEMPERATURE: 98.4 F | BODY MASS INDEX: 46.65 KG/M2 | RESPIRATION RATE: 18 BRPM | OXYGEN SATURATION: 94 % | HEIGHT: 69 IN | SYSTOLIC BLOOD PRESSURE: 132 MMHG | WEIGHT: 315 LBS | DIASTOLIC BLOOD PRESSURE: 70 MMHG

## 2019-03-16 PROBLEM — T14.8XXA DEEP TISSUE INJURY: Status: RESOLVED | Noted: 2019-02-28 | Resolved: 2019-03-16

## 2019-03-16 PROCEDURE — 90935 HEMODIALYSIS ONE EVALUATION: CPT | Performed by: INTERNAL MEDICINE

## 2019-03-16 PROCEDURE — 99239 HOSP IP/OBS DSCHRG MGMT >30: CPT | Performed by: INTERNAL MEDICINE

## 2019-03-16 RX ORDER — POLYVINYL ALCOHOL 14 MG/ML
1 SOLUTION/ DROPS OPHTHALMIC
Qty: 15 ML | Refills: 0 | Status: ON HOLD
Start: 2019-03-16 | End: 2021-01-18

## 2019-03-16 RX ORDER — DILTIAZEM HYDROCHLORIDE 180 MG/1
180 CAPSULE, COATED, EXTENDED RELEASE ORAL DAILY
Refills: 0
Start: 2019-03-16 | End: 2021-08-19 | Stop reason: HOSPADM

## 2019-03-16 RX ORDER — ATORVASTATIN CALCIUM 40 MG/1
40 TABLET, FILM COATED ORAL
Refills: 0
Start: 2019-03-16

## 2019-03-16 RX ORDER — MIDODRINE HYDROCHLORIDE 10 MG/1
10 TABLET ORAL
Refills: 0 | Status: ON HOLD
Start: 2019-03-16 | End: 2021-01-18

## 2019-03-16 RX ORDER — DOXYCYCLINE HYCLATE 100 MG/1
100 CAPSULE ORAL EVERY 12 HOURS SCHEDULED
Refills: 0
Start: 2019-03-16 | End: 2019-03-17

## 2019-03-16 RX ORDER — PANTOPRAZOLE SODIUM 40 MG/1
40 TABLET, DELAYED RELEASE ORAL
Refills: 0
Start: 2019-03-16 | End: 2021-11-09 | Stop reason: HOSPADM

## 2019-03-16 RX ORDER — ASPIRIN 81 MG/1
81 TABLET ORAL DAILY
Refills: 0
Start: 2019-03-16 | End: 2021-11-09 | Stop reason: HOSPADM

## 2019-03-16 RX ORDER — LANOLIN ALCOHOL/MO/W.PET/CERES
6 CREAM (GRAM) TOPICAL
Refills: 0 | Status: ON HOLD
Start: 2019-03-16 | End: 2021-01-18

## 2019-03-16 RX ORDER — NYSTATIN 100000 [USP'U]/G
POWDER TOPICAL 2 TIMES DAILY
Qty: 15 G | Refills: 0 | Status: ON HOLD
Start: 2019-03-16 | End: 2021-01-18

## 2019-03-16 RX ORDER — BISACODYL 10 MG
10 SUPPOSITORY, RECTAL RECTAL DAILY PRN
Qty: 12 SUPPOSITORY | Refills: 0
Start: 2019-03-16

## 2019-03-16 RX ORDER — DIAPER,BRIEF,INFANT-TODD,DISP
EACH MISCELLANEOUS 2 TIMES DAILY PRN
Qty: 30 G | Refills: 0 | Status: ON HOLD
Start: 2019-03-16 | End: 2021-01-18

## 2019-03-16 RX ORDER — METOPROLOL TARTRATE 50 MG/1
50 TABLET, FILM COATED ORAL 3 TIMES DAILY
Refills: 0
Start: 2019-03-16 | End: 2021-11-09 | Stop reason: HOSPADM

## 2019-03-16 RX ORDER — CHOLECALCIFEROL (VITAMIN D3) 10 MCG
1 TABLET ORAL
Refills: 0
Start: 2019-03-16 | End: 2021-08-19 | Stop reason: HOSPADM

## 2019-03-16 RX ORDER — SERTRALINE HYDROCHLORIDE 25 MG/1
25 TABLET, FILM COATED ORAL
Refills: 0
Start: 2019-03-16

## 2019-03-16 RX ORDER — ACETAMINOPHEN 325 MG/1
650 TABLET ORAL EVERY 6 HOURS PRN
Qty: 30 TABLET | Refills: 0
Start: 2019-03-16 | End: 2021-11-09 | Stop reason: HOSPADM

## 2019-03-16 RX ADMIN — DOXYCYCLINE 100 MG: 100 CAPSULE ORAL at 14:15

## 2019-03-16 RX ADMIN — Medication 400 MG: at 14:15

## 2019-03-16 RX ADMIN — PANTOPRAZOLE SODIUM 40 MG: 40 TABLET, DELAYED RELEASE ORAL at 06:15

## 2019-03-16 RX ADMIN — HEPARIN SODIUM 5000 UNITS: 5000 INJECTION INTRAVENOUS; SUBCUTANEOUS at 05:51

## 2019-03-16 RX ADMIN — NYSTATIN: 100000 POWDER TOPICAL at 14:18

## 2019-03-16 RX ADMIN — OXYCODONE HYDROCHLORIDE 7.5 MG: 5 TABLET ORAL at 14:16

## 2019-03-16 RX ADMIN — METOPROLOL TARTRATE 50 MG: 50 TABLET, FILM COATED ORAL at 14:15

## 2019-03-16 RX ADMIN — ASPIRIN 81 MG: 81 TABLET, COATED ORAL at 14:15

## 2019-03-16 NOTE — NURSING NOTE
Patient is for discharge to [facility]  Report given to [facility]  All due meds given  Patient c/o right knee pain  Oxycodone given prn for pain  Patient is awake,alert and pleasantly confused  Denies any other complaints  Refused to eat lunch  Encouraged to at least drink Ensure  Will send personal belongings with patient

## 2019-03-16 NOTE — PROGRESS NOTES
Progress Note - Nephrology   Georges Recio 60 y o  male MRN: [id number]  Unit/Bed#: [facility] 834-01 Encounter: [id number]      Assessment / Plan:  1  Acute Renal Failure/AcuteTubular Necrosis (POA, admit sCr 2 63), likely d/t ischemic/septic ATN  Cannot rule out AIN due to rash/eosinophilia  -peripheral eosinophilia improving  -patient seen by me on hemodialysis today  Has been dialysis dependent since 19  - Access:  Right IJ PermCath placed 19  -did urinate a bit yesterday  -monitor for signs of renal recovery  -no BMP this am  -should have monitoring for recovery of Acute kidney injury once discharged      2  Blood pressure/volume status  -most recent echocardiogram shows ejection fraction of 55%, history of systolic congestive heart failure  -UF goal 3L today     3  MSSA bacteremia  -CHON unremarkable  -antibiotics as per Infectious Disease     4  Hyponatremia  -mild, likely volume mediated, stable  UF as tolerated     5  Anemia - Hgb 6s, up to 7 8, monitor CBC outpatient     6  Corrected calcium normal            Subjective:   Patient seen examined by me on hemodialysis approximately 10:25 a m     Blood pressure 125/63, UF goal 3 L, blood flow 375 mL/min, dialysate flow 600 mL/min  Patient tolerating dialysis well  Objective:     Vitals: Blood pressure 126/70, pulse 69, temperature 98 4 °F (36 9 °C), temperature source Oral, resp  rate 18, height 5' 9" (1 753 m), weight (!) 158 kg (348 lb 5 2 oz), SpO2 94 %  ,Body mass index is 51 44 kg/m²  Temp (24hrs), Av 3 °F (36 8 °C), Min:97 8 °F (36 6 °C), Max:98 7 °F (37 1 °C)      Weight (last 2 days)     Date/Time   Weight    19 0339   158 (348 33)  (Abnormal)     03/15/19 0600   158 (347 89)  (Abnormal)     19 0600   161 (355 38)  (Abnormal)                 Intake/Output Summary (Last 24 hours) at 3/16/2019 1045  Last data filed at 3/16/2019 0809  Gross per 24 hour   Intake 350 ml   Output 222 ml   Net 128 ml     I/O last 24 hours:   In: 410 [P O :210; I V :200]  Out: 5 [Urine:222]        Physical Exam:   Physical Exam   Constitutional: He appears well-developed and well-nourished  No distress  HENT:   Head: Normocephalic and atraumatic  Mouth/Throat: No oropharyngeal exudate  Eyes: Right eye exhibits no discharge  Left eye exhibits no discharge  No scleral icterus  Neck: Neck supple  Cardiovascular: Normal rate, regular rhythm and normal heart sounds  Pulmonary/Chest: Effort normal and breath sounds normal  He has no wheezes  He has no rales  Abdominal: Soft  Bowel sounds are normal  He exhibits no distension  There is no tenderness  Musculoskeletal: Normal range of motion  He exhibits edema (b/l LE, 3+ pitting)  Neurological: He is alert  awake   Skin: Skin is warm and dry  No rash noted  He is not diaphoretic  Psychiatric:   Flat affect   Vitals reviewed        Invasive Devices     Peripheral Intravenous Line            Peripheral IV 03/12/19 Right Antecubital 3 days          Hemodialysis Catheter            Permanent HD Catheter  25 days                Medications:    Scheduled Meds:  Current Facility-Administered Medications:  acetaminophen 650 mg Oral Q6H PRN KATHY Maza   aspirin 81 mg Oral Daily Tierra Harris DO   atorvastatin 40 mg Oral Daily With KATHY Baker   b complex-vitamin C-folic acid 1 capsule Oral Daily With Yesenia Pérez MD   bisacodyl 10 mg Rectal Daily PRN KATHY Maza   diltiazem 180 mg Oral Daily Ting Bush MD   doxycycline hyclate 100 mg Oral Q12H [address] Clarisa Longo MD   heparin (porcine) 5,000 Units Subcutaneous [facility] Tierra Harris DO   hydrocortisone  Topical BID PRN Tierra Harris DO   magnesium oxide 400 mg Oral Daily Betty Marks MD   melatonin 6 mg Oral HS Leidy Martinez PA-C   metoprolol 5 mg Intravenous Q6H PRN Elvie Hamilton MD   metoprolol tartrate 50 mg Oral TID Betty Marks MD   midodrine 10 mg Oral Before Dialysis Betty Marks MD   nystatin Topical BID KATHY Maravilla   oxyCODONE 5 mg Oral Q4H PRN Dony Rider MD   oxyCODONE 7 5 mg Oral Q4H PRN Dony Rider MD   pantoprazole 40 mg Oral BID AC Suresh Eckert PA-C   polyvinyl alcohol 1 drop Both Eyes Q3H PRN KATHY Maravilla   sertraline 25 mg Oral After Joy Rader MD       PRN Meds:   acetaminophen    bisacodyl    hydrocortisone    metoprolol    midodrine    oxyCODONE    oxyCODONE    polyvinyl alcohol    Continuous Infusions:         LAB RESULTS:      Results from last 7 days   Lab Units 03/15/19  0436 03/14/19  0931 03/14/19  0833 03/12/19  0458 03/11/19  0635 03/10/19  0705   WBC Thousand/uL  --   --  6 51 8 98 8 41  --    HEMOGLOBIN g/dL  --  7 8* 6 3* 8 7* 9 1*  --    HEMATOCRIT %  --  25 4* 20 4* 28 1* 29 9*  --    PLATELETS Thousands/uL  --   --  146* 147* 158  --    NEUTROS PCT %  --   --   --  69 61  --    LYMPHS PCT %  --   --   --  7* 8*  --    MONOS PCT %  --   --   --  6 7  --    EOS PCT %  --   --   --  16* 21*  --    POTASSIUM mmol/L 4 0  --  3 7 4 5 3 9 4 0   CHLORIDE mmol/L 100  --  101 98* 96* 98*   CO2 mmol/L 29  --  32 27 31 28   BUN mg/dL 24  --  25 43* 34* 22   CREATININE mg/dL 3 39*  --  3 14* 4 86* 4 24* 3 37*   CALCIUM mg/dL 7 8*  --  6 8* 7 4* 7 7* 7 5*   MAGNESIUM mg/dL  --   --   --  2 0 1 9 1 9       CUTURES:  Lab Results   Component Value Date    BLOODCX No Growth After 5 Days  01/27/2019    BLOODCX No Growth After 5 Days  01/27/2019    BLOODCX Staphylococcus aureus (A) 01/26/2019    BLOODCX Staphylococcus aureus (A) 01/26/2019    BLOODCX Staphylococcus aureus (A) 01/24/2019    BLOODCX Staphylococcus aureus (A) 01/24/2019    BLOODCX Staphylococcus aureus (A) 01/23/2019    URINECX [id number] cfu/ml Gram Positive Cocci (A) 01/23/2019                 Portions of the record may have been created with voice recognition software   Occasional wrong word or "sound a like" substitutions may have occurred due to the inherent limitations of voice recognition software  Read the chart carefully and recognize, using context, where substitutions have occurred  If you have any questions, please contact the dictating provider

## 2019-03-16 NOTE — DISCHARGE SUMMARY
Discharge Summary - [facility] Internal Medicine    Patient Information: Delgado Amador 60 y o  male MRN: [id number]  Unit/Bed#: [facility] 834-01 Encounter: [id number]    Discharging Physician / Practitioner: Liz Thornton DO  PCP: Erma Marrero DO  Admission Date: 1/24/2019  Discharge Date: 03/16/19    Disposition:     Other: [place]    Reason for Admission:   MSSA bacteremia    Discharge Diagnoses:     Principal Problem:    MSSA bacteremia - Resolved septic shock  Active Problems:    Essential hypertension    Stress-induced cardiomyopathy    History of aortic valve replacement with bioprosthetic valve    Persistent atrial fibrillation (HCC)    Acute blood loss anemia - Gastric ulcer    Cerebrovascular accident ([address] )    Acute metabolic encephalopathy    Skin rash    Acute renal failure    GI bleed    Left arm swelling    Dysphagia    Cellulitis/myositis of left forearm    Left internal jugular vein thrombus    Morbid obesity     Depression  Resolved Problems:    Acute encephalopathy    NSTEMI (non-ST elevated myocardial infarction) ([address] )    ESRD (end stage renal disease) ([facility] )    Acute respiratory failure with hypoxia ([facility] )    Rhabdomyolysis    Cardiogenic shock ([facility] )    Septic shock (HCC)    Transaminitis    Thrombocytopenia (HCC)    Hypervolemia    Hyponatremia    Hypovolemic shock ([facility] )    Deep tissue injury    Atrial flutter/fibrillation - Stress-induced cardiomyopathy       Consultations During Hospital Stay:  · Psychiatry  · Vascular surgery  · Podiatry  · GI  · General surgery  · Neurology  · Nephrology  · Cardiology  · Infectious Disease    Procedures Performed:     1/23 CXR: Findings of volume overload including pulmonary vascular congestion and mild alveolar edema  1/23 CTH: 12 hour follow-up reassessment suggested as there could be subacute ischemia  No hemorrhage    1/23 CT CH/AB/P: Patchy consolidative changes at the bilateral lung bases, likely secondary to atelectasis, or  acute infiltrate, possibly secondary to aspiration   1/23 BC MSSA  1/24 R IJ temp HD catheter and L IJ TLC  1/24 CRRT  1/24 BC staph aureus  1/25: CHON: no vegetation appreciated EF 40% PFO  1/27 Repeat BC negative  2/06 Extubated  2/21: L radial A-line, LIJ Cordis  2/21: V-tach, defibrillation x2, lido 100x3, amio 150x2, amio infusion   2/21: 9u PRBC, 2 FFP, 10 vit Kx2  2/22: Intubation  2/22 CTA a/p: Small foci of hyperdensity within the gastric antrum/pylorus area concerning for primary site of active hemorrhage given history of melanoma  Extensive distention of the entire colon with liquid stool and gas with loss of haustra within the descending colon and rectum  There are areas of pneumatosis seen within the splenic flexure and sigmoid colon  2/22 EGD large gastric ulcer cauterized and injected with epi, smaller adjacent ulcer clipped  2/23 EGD - no intervention   2/23 5 PRBCs, 2 FFP, 1 plt  2/24 1 PRBCs  2/25 EGD clean margins of ulcer  2/25 C-scope: patchy ischemic colitis, decompressive colonoscopy  2/26: Extubation  3/1/2019, nonocclusive acute DVT in the left internal jugular vein  3/5/2019 left forearm CT scan  Extensive subcutaneous edema throughout the forearm with associated radial muscle edema concerning for nonspecific cellulitis and myositis  Additionally, there is deep fascial edema noted along the radial soft tissues of the proximal forearm raising   suspicion for necrotizing fasciitis without onelia soft tissue emphysema at this time  No drainable abscess collection  No underlying osseous destructive changes to indicate osteomyelitis  3/12/2019 head CT scan  No acute intracranial abnormality      Stable areas of encephalomalacia involving the bilateral parietal lobes, inferior left frontal lobe, right occipital lobe and inferior left cerebellar hemisphere when compared to a recent CT brain dated February 10, 2019  However, these are new when   compared to a CT brain dated January 23, 2019    Again differential considerations include multifocal ischemia, metastatic disease, infection, among other etiologies  An enhanced MRI of the brain could be performed for further evaluation  Cardiac echo on 01/24 with EF 30%  Repeat echo on 2/ 19 with EF 55%  ·     Significant Findings / Test Results:     · As above    Incidental Findings:   · As above    Test Results Pending at Discharge (will require follow up):   · none     Outpatient Tests Requested:  · none    Complications:  As above    Hospital Course:     Gisela Leary is a 60 y o  male patient who originally presented to the hospital on 1/24/2019 due to shock, bacteremia and renal failure  Patient initially was admitted to [address] [place] [address] then transferred to [facility] [place], operated by [facility] ICU, ultimately required intubation and initiation of vasopressor  Acute renal failure requiring CVVH  Patient was transferred out of the ICU on 02/08  He was found to have GI bleed requiring intubation and transfer again to ICU, then he was transferred out of the ICU on 02/27    1  MSSA bacteremia, negative CHON, completed 6 weeks of IV antibiotic   2  Recent shock, multifactorial,  cardiogenic/septic, resolved  3  PATRICK secondary to ATN, no sign of renal recovery, started on HD and has been dialysis dependent since 2/1/2019, on HD  TTS  4  Acute systolic CHF/stress cardiomyopathy, with initial EF 30% then improved to 55%   5  S/p bio AVR, negative CHON for vegetation  6  Acute hypoxic respiratory failure secondary to above, resolved,  s/p extubation,   7  New onset AFib/ A flutter, on Cardizem and Lopressor, per Cardiology,  no chronic anticoagulation due to bleeding risk, he may be a candidate for Watchman device in the future   8  Toxic metabolic encephalopathy with likely bilateral ischemic CVA, negative carotid ultrasound, continue aspirin and statin   9   Acute blood loss anemia secondary to esophagitis, gastric ulcer and ischemic colitis, per GI plan to repeat EGD in 6-8 weeks,  continue PPI  10  Left forearm myositis/cellulitis, skin rash with peripheral eosinophilia, switched to oral antibiotic through 03/16  11  Left IJ DVT with superficial thrombophlebitis, likely provoked by central line, due to risk of bleeding cardiology recommending no AC, continue aspirin   12  Morbid obesity  13  Depression, continue Zoloft       Patient currently is in stable condition, he will be discharged to [facility] home on dialysis Tuesday Thursday and [address]  For more details on this long hospitalization please  referred to the chart     Condition at Discharge: fair     Discharge Day Visit / Exam:     Subjective:    Patient seen and examined  Comfortable in bed  No event overnight  Vitals: Blood Pressure: 132/70 (03/16/19 1214)  Pulse: 68 (03/16/19 1214)  Temperature: 98 4 °F (36 9 °C) (03/16/19 0700)  Temp Source: Oral (03/16/19 0700)  Respirations: 18 (03/16/19 0700)  Height: 5' 9" (175 3 cm) (01/24/19 1530)  Weight - Scale: (!) 158 kg (348 lb 5 2 oz) (03/16/19 0339)  SpO2: 94 % (03/16/19 0700)  Exam:   Physical Exam  Patient is mildly drowsy after dialysis today in no acute distress  Lung clear to auscultation bilateral anteriorly  Heart positive S1-S2 no murmur irregular  Abdomen soft obese nontender  Bilateral lower extremities pitting edema  Discussion with Family:  Wife    Discharge instructions/Information to patient and family:   See after visit summary for information provided to patient and family  Provisions for Follow-Up Care:  See after visit summary for information related to follow-up care and any pertinent home health orders  Planned Readmission: no     Discharge Statement:  I spent 50  minutes discharging the patient  This time was spent on the day of discharge  I had direct contact with the patient on the day of discharge   Greater than 50% of the total time was spent examining patient, answering all patient questions, arranging and discussing plan of care with patient as well as directly providing post-discharge instructions  Additional time then spent on discharge activities  Discharge Medications:  See after visit summary for reconciled discharge medications provided to patient and family        ** Please Note: This note has been constructed using a voice recognition system **

## 2019-03-18 ENCOUNTER — TELEPHONE (OUTPATIENT)
Dept: INFECTIOUS DISEASES | Facility: CLINIC | Age: 59
End: 2019-03-18

## 2019-03-18 DIAGNOSIS — R78.81 MSSA BACTEREMIA: Primary | ICD-10-CM

## 2019-03-18 DIAGNOSIS — B95.61 MSSA BACTEREMIA: Primary | ICD-10-CM

## 2019-03-18 NOTE — TELEPHONE ENCOUNTER
Called and spoke with [facility], nurse at [facility] to discuss two sets of follow up blood cultures to be drawn at facility this Friday 3/22 and results faxed over to us  Pt does not need follow up in our office and does not have any additional orders  Faxed the order over to fax # [phone]

## 2019-03-19 ENCOUNTER — TELEPHONE (OUTPATIENT)
Dept: NEUROLOGY | Facility: CLINIC | Age: 59
End: 2019-03-19

## 2019-03-19 NOTE — TELEPHONE ENCOUNTER
----- Message from Genny Macedo PA-C sent at 2/12/2019 10:13 AM EST -----  Regarding: Hosp f/u  Diagnosis/Reason for follow-up:  Bilateral parietal occipital strokes  Subspecialty for follow-up:  Stroke clinic  Existing neurologist:  N/A  Tests/Labs/Imaging ordered:  Hemoglobin A1c, TSH, lipid panel  Orders placed electronically:  N/A    Thanks!

## 2019-03-19 NOTE — SOCIAL WORK
Edy received confirmation from Camelia Winters with [facility] that same Ali Rathke can be used and auth was transferred from [facility] to [facility]  Cm informed Sharron with [facility] of this and confirmed auth number #[id number]  Edy following

## 2019-03-22 ENCOUNTER — SOCIAL WORK (OUTPATIENT)
Dept: CASE MANAGEMENT | Facility: HOSPITAL | Age: 59
End: 2019-03-22

## 2019-03-22 LAB
ATRIAL RATE: 267 BPM
QRS AXIS: 110 DEGREES
QRSD INTERVAL: 148 MS
QT INTERVAL: 366 MS
QTC INTERVAL: 534 MS
T WAVE AXIS: -49 DEGREES
VENTRICULAR RATE: 128 BPM

## 2019-03-22 PROCEDURE — 93010 ELECTROCARDIOGRAM REPORT: CPT | Performed by: INTERNAL MEDICINE

## 2019-03-22 NOTE — PROGRESS NOTES
TC received phone call from [facility], [address]  Tana requesting letter with pt d/c date and location to complete FMLA paperwork that was sent on admission  TC made to pt wife, Anahi Henry, informing her of same  Anahi Henry requested CM send letter to Tana  Letter completed  TC to Tana to inform her of same  CM faxed letter at her request to [phone]  Tana informed CM they would continue to f/u with [facility] and pt PCP for any additional paperwork that may be needed

## 2019-04-09 ENCOUNTER — TRANSCRIBE ORDERS (OUTPATIENT)
Dept: ADMINISTRATIVE | Facility: HOSPITAL | Age: 59
End: 2019-04-09

## 2019-04-09 DIAGNOSIS — R13.10 DYSPHAGIA, UNSPECIFIED TYPE: Primary | ICD-10-CM

## 2019-04-15 ENCOUNTER — OFFICE VISIT (OUTPATIENT)
Dept: NEUROLOGY | Facility: CLINIC | Age: 59
End: 2019-04-15
Payer: COMMERCIAL

## 2019-04-15 VITALS — DIASTOLIC BLOOD PRESSURE: 56 MMHG | SYSTOLIC BLOOD PRESSURE: 101 MMHG | HEART RATE: 99 BPM

## 2019-04-15 DIAGNOSIS — R93.0 ABNORMAL CT OF THE HEAD: Primary | ICD-10-CM

## 2019-04-15 DIAGNOSIS — I48.19 PERSISTENT ATRIAL FIBRILLATION (HCC): ICD-10-CM

## 2019-04-15 PROCEDURE — 99215 OFFICE O/P EST HI 40 MIN: CPT | Performed by: PHYSICIAN ASSISTANT

## 2019-04-15 RX ORDER — TRAMADOL HYDROCHLORIDE 50 MG/1
50 TABLET ORAL EVERY 6 HOURS PRN
COMMUNITY
End: 2021-08-19 | Stop reason: HOSPADM

## 2019-04-17 ENCOUNTER — TELEPHONE (OUTPATIENT)
Dept: INTERVENTIONAL RADIOLOGY/VASCULAR | Facility: HOSPITAL | Age: 59
End: 2019-04-17

## 2019-04-17 ENCOUNTER — HOSPITAL ENCOUNTER (OUTPATIENT)
Dept: RADIOLOGY | Facility: HOSPITAL | Age: 59
Discharge: HOME | End: 2019-04-17
Attending: INTERNAL MEDICINE
Payer: COMMERCIAL

## 2019-04-17 ENCOUNTER — TELEPHONE (OUTPATIENT)
Dept: GASTROENTEROLOGY | Facility: CLINIC | Age: 59
End: 2019-04-17

## 2019-04-17 DIAGNOSIS — R13.10 DYSPHAGIA, UNSPECIFIED TYPE: ICD-10-CM

## 2019-04-17 PROBLEM — K25.9 GASTRIC ULCER: Status: ACTIVE | Noted: 2019-04-17

## 2019-04-17 PROCEDURE — G8997 SWALLOW GOAL STATUS: HCPCS

## 2019-04-17 PROCEDURE — 74230 X-RAY XM SWLNG FUNCJ C+: CPT

## 2019-04-17 PROCEDURE — G8998 SWALLOW D/C STATUS: HCPCS

## 2019-04-17 PROCEDURE — G8996 SWALLOW CURRENT STATUS: HCPCS

## 2019-04-17 PROCEDURE — 92611 MOTION FLUOROSCOPY/SWALLOW: CPT

## 2019-04-18 ENCOUNTER — HOSPITAL ENCOUNTER (OUTPATIENT)
Dept: INTERVENTIONAL RADIOLOGY/VASCULAR | Facility: HOSPITAL | Age: 59
Discharge: HOME | End: 2019-04-18
Payer: COMMERCIAL

## 2019-04-18 VITALS
HEART RATE: 76 BPM | DIASTOLIC BLOOD PRESSURE: 60 MMHG | RESPIRATION RATE: 16 BRPM | SYSTOLIC BLOOD PRESSURE: 118 MMHG | OXYGEN SATURATION: 99 %

## 2019-04-18 DIAGNOSIS — Z99.2 VASCULAR DIALYSIS CATHETER IN PLACE (HCC): ICD-10-CM

## 2019-04-18 PROCEDURE — C1750 CATH, HEMODIALYSIS,LONG-TERM: HCPCS

## 2019-04-18 PROCEDURE — 36581 REPLACE TUNNELED CV CATH: CPT

## 2019-04-18 PROCEDURE — NC001 PR NO CHARGE: Performed by: RADIOLOGY

## 2019-04-18 PROCEDURE — 36581 REPLACE TUNNELED CV CATH: CPT | Performed by: RADIOLOGY

## 2019-04-18 PROCEDURE — 77001 FLUOROGUIDE FOR VEIN DEVICE: CPT

## 2019-04-18 PROCEDURE — C1769 GUIDE WIRE: HCPCS

## 2019-04-18 PROCEDURE — 77001 FLUOROGUIDE FOR VEIN DEVICE: CPT | Performed by: RADIOLOGY

## 2019-04-18 RX ORDER — HEPARIN SODIUM (PORCINE) LOCK FLUSH IV SOLN 100 UNIT/ML 100 UNIT/ML
SOLUTION INTRAVENOUS CODE/TRAUMA/SEDATION MEDICATION
Status: COMPLETED | OUTPATIENT
Start: 2019-04-18 | End: 2019-04-18

## 2019-04-18 RX ORDER — LIDOCAINE HYDROCHLORIDE 10 MG/ML
INJECTION, SOLUTION INFILTRATION; PERINEURAL CODE/TRAUMA/SEDATION MEDICATION
Status: COMPLETED | OUTPATIENT
Start: 2019-04-18 | End: 2019-04-18

## 2019-04-18 RX ADMIN — IOHEXOL 20 ML: 300 INJECTION, SOLUTION INTRAVENOUS at 12:05

## 2019-04-18 RX ADMIN — HEPARIN SODIUM (PORCINE) LOCK FLUSH IV SOLN 100 UNIT/ML 500 UNITS: 100 SOLUTION at 12:17

## 2019-04-18 RX ADMIN — LIDOCAINE HYDROCHLORIDE 10 ML: 10 INJECTION, SOLUTION INFILTRATION; PERINEURAL at 12:11

## 2019-04-29 ENCOUNTER — TELEPHONE (OUTPATIENT)
Dept: NEUROLOGY | Facility: CLINIC | Age: 59
End: 2019-04-29

## 2019-05-13 ENCOUNTER — HOSPITAL ENCOUNTER (OUTPATIENT)
Dept: MRI IMAGING | Facility: HOSPITAL | Age: 59
Discharge: HOME | End: 2019-05-13
Payer: COMMERCIAL

## 2019-05-13 DIAGNOSIS — R93.0 ABNORMAL CT OF THE HEAD: ICD-10-CM

## 2019-05-13 PROCEDURE — 70551 MRI BRAIN STEM W/O DYE: CPT

## 2019-05-15 ENCOUNTER — OFFICE VISIT (OUTPATIENT)
Dept: NEUROLOGY | Facility: CLINIC | Age: 59
End: 2019-05-15
Payer: COMMERCIAL

## 2019-05-15 VITALS — DIASTOLIC BLOOD PRESSURE: 60 MMHG | SYSTOLIC BLOOD PRESSURE: 104 MMHG | HEART RATE: 66 BPM

## 2019-05-15 DIAGNOSIS — Z86.73 HISTORY OF CVA (CEREBROVASCULAR ACCIDENT): ICD-10-CM

## 2019-05-15 DIAGNOSIS — I10 ESSENTIAL HYPERTENSION: Chronic | ICD-10-CM

## 2019-05-15 DIAGNOSIS — I63.9 CEREBROVASCULAR ACCIDENT (CVA), UNSPECIFIED MECHANISM (HCC): ICD-10-CM

## 2019-05-15 DIAGNOSIS — I48.19 PERSISTENT ATRIAL FIBRILLATION (HCC): Primary | ICD-10-CM

## 2019-05-15 PROCEDURE — 99214 OFFICE O/P EST MOD 30 MIN: CPT | Performed by: PSYCHIATRY & NEUROLOGY

## 2019-05-24 ENCOUNTER — HOSPITAL ENCOUNTER (OUTPATIENT)
Dept: PERIOP | Facility: HOSPITAL | Age: 59
Discharge: HOME | End: 2019-05-24
Attending: INTERNAL MEDICINE | Admitting: INTERNAL MEDICINE
Payer: COMMERCIAL

## 2019-05-24 ENCOUNTER — ANESTHESIA EVENT (OUTPATIENT)
Dept: PERIOP | Facility: HOSPITAL | Age: 59
End: 2019-05-24

## 2019-05-24 ENCOUNTER — ANESTHESIA (OUTPATIENT)
Dept: PERIOP | Facility: HOSPITAL | Age: 59
End: 2019-05-24

## 2019-05-24 VITALS
HEART RATE: 88 BPM | RESPIRATION RATE: 18 BRPM | SYSTOLIC BLOOD PRESSURE: 111 MMHG | DIASTOLIC BLOOD PRESSURE: 67 MMHG | TEMPERATURE: 99.9 F | OXYGEN SATURATION: 95 %

## 2019-05-24 DIAGNOSIS — K25.9 GASTRIC ULCER WITHOUT HEMORRHAGE OR PERFORATION: ICD-10-CM

## 2019-05-24 PROCEDURE — 43235 EGD DIAGNOSTIC BRUSH WASH: CPT | Performed by: INTERNAL MEDICINE

## 2019-05-24 RX ORDER — PROPOFOL 10 MG/ML
INJECTION, EMULSION INTRAVENOUS AS NEEDED
Status: DISCONTINUED | OUTPATIENT
Start: 2019-05-24 | End: 2019-05-24 | Stop reason: SURG

## 2019-05-24 RX ORDER — METOCLOPRAMIDE HYDROCHLORIDE 5 MG/ML
10 INJECTION INTRAMUSCULAR; INTRAVENOUS ONCE AS NEEDED
Status: DISCONTINUED | OUTPATIENT
Start: 2019-05-24 | End: 2019-05-28 | Stop reason: HOSPADM

## 2019-05-24 RX ORDER — LIDOCAINE HYDROCHLORIDE 10 MG/ML
INJECTION, SOLUTION INFILTRATION; PERINEURAL AS NEEDED
Status: DISCONTINUED | OUTPATIENT
Start: 2019-05-24 | End: 2019-05-24 | Stop reason: SURG

## 2019-05-24 RX ORDER — ONDANSETRON 2 MG/ML
4 INJECTION INTRAMUSCULAR; INTRAVENOUS ONCE AS NEEDED
Status: DISCONTINUED | OUTPATIENT
Start: 2019-05-24 | End: 2019-05-28 | Stop reason: HOSPADM

## 2019-05-24 RX ORDER — SODIUM CHLORIDE, SODIUM LACTATE, POTASSIUM CHLORIDE, CALCIUM CHLORIDE 600; 310; 30; 20 MG/100ML; MG/100ML; MG/100ML; MG/100ML
125 INJECTION, SOLUTION INTRAVENOUS CONTINUOUS
Status: DISCONTINUED | OUTPATIENT
Start: 2019-05-24 | End: 2019-05-28 | Stop reason: HOSPADM

## 2019-05-24 RX ADMIN — PROPOFOL 100 MG: 10 INJECTION, EMULSION INTRAVENOUS at 13:03

## 2019-05-24 RX ADMIN — SODIUM CHLORIDE, SODIUM LACTATE, POTASSIUM CHLORIDE, AND CALCIUM CHLORIDE 125 ML/HR: .6; .31; .03; .02 INJECTION, SOLUTION INTRAVENOUS at 12:07

## 2019-05-24 RX ADMIN — LIDOCAINE HYDROCHLORIDE 100 MG: 10 INJECTION, SOLUTION INFILTRATION; PERINEURAL at 13:03

## 2019-06-13 ENCOUNTER — OFFICE VISIT (OUTPATIENT)
Dept: CARDIOLOGY CLINIC | Facility: HOSPITAL | Age: 59
End: 2019-06-13
Payer: COMMERCIAL

## 2019-06-13 VITALS
HEIGHT: 69 IN | OXYGEN SATURATION: 100 % | DIASTOLIC BLOOD PRESSURE: 74 MMHG | WEIGHT: 270 LBS | SYSTOLIC BLOOD PRESSURE: 112 MMHG | HEART RATE: 73 BPM | BODY MASS INDEX: 39.99 KG/M2

## 2019-06-13 DIAGNOSIS — I31.9 PERICARDIAL DISEASE: ICD-10-CM

## 2019-06-13 DIAGNOSIS — Z86.73 HISTORY OF CVA (CEREBROVASCULAR ACCIDENT): ICD-10-CM

## 2019-06-13 DIAGNOSIS — I51.81 STRESS-INDUCED CARDIOMYOPATHY: ICD-10-CM

## 2019-06-13 DIAGNOSIS — I63.9 CEREBROVASCULAR ACCIDENT (CVA), UNSPECIFIED MECHANISM (HCC): ICD-10-CM

## 2019-06-13 DIAGNOSIS — I35.0 AORTIC STENOSIS, MILD: Primary | Chronic | ICD-10-CM

## 2019-06-13 DIAGNOSIS — I44.7 LEFT BUNDLE BRANCH BLOCK: Chronic | ICD-10-CM

## 2019-06-13 DIAGNOSIS — I48.19 PERSISTENT ATRIAL FIBRILLATION (HCC): ICD-10-CM

## 2019-06-13 DIAGNOSIS — K25.4 GASTROINTESTINAL HEMORRHAGE ASSOCIATED WITH GASTRIC ULCER: ICD-10-CM

## 2019-06-13 DIAGNOSIS — E78.2 MIXED HYPERLIPIDEMIA: Chronic | ICD-10-CM

## 2019-06-13 DIAGNOSIS — K25.9 GASTRIC ULCER, UNSPECIFIED CHRONICITY, UNSPECIFIED WHETHER GASTRIC ULCER HEMORRHAGE OR PERFORATION PRESENT: ICD-10-CM

## 2019-06-13 PROCEDURE — 93000 ELECTROCARDIOGRAM COMPLETE: CPT | Performed by: INTERNAL MEDICINE

## 2019-06-13 PROCEDURE — 99214 OFFICE O/P EST MOD 30 MIN: CPT | Performed by: INTERNAL MEDICINE

## 2019-09-10 ENCOUNTER — TELEPHONE (OUTPATIENT)
Dept: CARDIOLOGY CLINIC | Facility: HOSPITAL | Age: 59
End: 2019-09-10

## 2019-09-10 NOTE — TELEPHONE ENCOUNTER
Spoke with Cathryn Purcell from [facility] and she confirmed that Lorraine Irizarry will be at his appointment with cardiology on Wednesday September 11th

## 2019-09-11 ENCOUNTER — OFFICE VISIT (OUTPATIENT)
Dept: CARDIOLOGY CLINIC | Facility: HOSPITAL | Age: 59
End: 2019-09-11
Payer: COMMERCIAL

## 2019-09-11 VITALS
DIASTOLIC BLOOD PRESSURE: 68 MMHG | HEIGHT: 69 IN | SYSTOLIC BLOOD PRESSURE: 140 MMHG | HEART RATE: 65 BPM | WEIGHT: 251.3 LBS | BODY MASS INDEX: 37.22 KG/M2

## 2019-09-11 DIAGNOSIS — I63.9 CEREBROVASCULAR ACCIDENT (CVA), UNSPECIFIED MECHANISM (HCC): ICD-10-CM

## 2019-09-11 DIAGNOSIS — I31.9 PERICARDIAL DISEASE: ICD-10-CM

## 2019-09-11 DIAGNOSIS — E78.2 MIXED HYPERLIPIDEMIA: Chronic | ICD-10-CM

## 2019-09-11 DIAGNOSIS — I51.81 STRESS-INDUCED CARDIOMYOPATHY: ICD-10-CM

## 2019-09-11 DIAGNOSIS — I48.19 PERSISTENT ATRIAL FIBRILLATION (HCC): ICD-10-CM

## 2019-09-11 DIAGNOSIS — I10 ESSENTIAL HYPERTENSION: Chronic | ICD-10-CM

## 2019-09-11 DIAGNOSIS — I44.7 LEFT BUNDLE BRANCH BLOCK: Chronic | ICD-10-CM

## 2019-09-11 DIAGNOSIS — I35.0 AORTIC STENOSIS, MILD: Primary | Chronic | ICD-10-CM

## 2019-09-11 DIAGNOSIS — K25.4 GASTROINTESTINAL HEMORRHAGE ASSOCIATED WITH GASTRIC ULCER: ICD-10-CM

## 2019-09-11 PROCEDURE — 99214 OFFICE O/P EST MOD 30 MIN: CPT | Performed by: INTERNAL MEDICINE

## 2019-09-11 PROCEDURE — 93000 ELECTROCARDIOGRAM COMPLETE: CPT | Performed by: INTERNAL MEDICINE

## 2019-09-11 RX ORDER — BUMETANIDE 1 MG/1
2 TABLET ORAL 2 TIMES DAILY
COMMUNITY
End: 2021-11-09 | Stop reason: HOSPADM

## 2019-09-11 NOTE — PROGRESS NOTES
Cardiology Follow Up    Jing Wilcox  1960  [id number]  [address] [id number]    1  Aortic stenosis, mild     2  Cerebrovascular accident (CVA), unspecified mechanism ([address] )  POCT ECG   3  Essential hypertension     4  Left bundle branch block     5  Pericardial disease     6  Persistent atrial fibrillation (HCC)  POCT ECG   7  Stress-induced cardiomyopathy     8  Gastrointestinal hemorrhage associated with gastric ulcer     9  Mixed hyperlipidemia         Discussion/Summary: Lorraine Irizarry has had a long complex hospital stay and rehabilitation is ongoing  I was following him for bioprosthetic AVR, chronic diastolic heart failure, and left bundle branch block previously  He developed sepsis in early January and a stress-induced cardiomyopathy following that  He had overwhelming septic shock as well as concomitant cardiogenic shock  He had a long ICU stay and developed a stress ulcer with significant hemorrhaging that was life threatening  He then developed atrial fibrillation and had a stroke  He was not placed on anticoagulation due to the life-threatening GI bleed  He was eventually discharged to [facility] for rehabilitation  Patient tells me that he was told his rehabilitation had run out and he spends most of the day laying in the bed with no activity  He had a prior pressure ulcer unknown stage at this point time  He tells me that he is unable to walk  Since our last visit HR ulcers continues to remain significantly debilitated and is bed-bound  From a cardiac standpoint things have been stable  He remains in sinus rhythm no signs decompensated heart failure    I will discuss the case with our electrophysiology team about Binu Daly however given his significant debilitation I do not think this would changes overall mortality much         Interval History:  Routine follow-up visit  Think the hospital stay referenced above  Patient offers minimal complaints other than his inability to ambulate  He has weakness from the stroke but says he was doing well with therapy and then things and halted  Since her last visit he has not received any other therapy he is essentially bed bound at this point time  He offers no cardiac complaints  There has been no chest pain, shortness of breath, palpitations, lightheadedness, dizziness, or syncope  He does have some chronic lower extremity edema which has been stable  Problem List     Aortic stenosis, mild    Overview Signed 4/11/2018  7:59 AM by Brittni Carvalho DO     Description: Severe  ECHO DONE 6-2014  SEVERE AS  PEAK GRADIENT-65mmHg  mean gradient was 38mmHg  MARY-0 7cm2  mild AI           Essential hypertension    Hyperlipidemia    Left bundle branch block        Past Medical History:   Diagnosis Date    Allergic rhinitis     last assessed 9/12/12    Finger fracture, right     Closed fx of the middle phalanx of the right 5th finger  last assessed 1/30/14    Hemorrhoids     last assessed 2/10/14    Hyperlipidemia     Hypertension     Stroke [facility])      Social History     Socioeconomic History    Marital status: /Civil Union     Spouse name: Not on file    Number of children: Not on file    Years of education: Not on file    Highest education level: Not on file   Occupational History    Not on file   Social Needs    Financial resource strain: Not on file    Food insecurity:     Worry: Not on file     Inability: Not on file    Transportation needs:     Medical: Not on file     Non-medical: Not on file   Tobacco Use    Smoking status: Never Smoker    Smokeless tobacco: Never Used   Substance and Sexual Activity    Alcohol use: Never     Frequency: Never     Comment: ocassion /never drank alcohol per [employer]     Drug use: No    Sexual activity: Not on file Lifestyle    Physical activity:     Days per week: Not on file     Minutes per session: Not on file    Stress: Not on file   Relationships    Social connections:     Talks on phone: Not on file     Gets together: Not on file     Attends Mosque service: Not on file     Active member of club or organization: Not on file     Attends meetings of clubs or organizations: Not on file     Relationship status: Not on file    Intimate partner violence:     Fear of current or ex partner: Not on file     Emotionally abused: Not on file     Physically abused: Not on file     Forced sexual activity: Not on file   Other Topics Concern    Not on file   Social History Narrative    Not on file      Family History   Problem Relation Age of Onset    Lung cancer Mother     Heart disease Mother         pacemaker placement     Coronary artery disease Father     Hypertension Father     Heart attack Father     Cancer Family         bladder      Past Surgical History:   Procedure Laterality Date    AORTIC VALVE REPLACEMENT  07/30/2014    AVR with 25mm [facility] Ease bovine pericardial valve    CARDIAC CATHETERIZATION  07/18/2014    SLB left main-normal, circumflex-normal, ramus intermedius-normal, RCA was large and dominant giving rise to the PDA & a large posterolateral branch  No disease  last assessed 8/19/14     COLONOSCOPY N/A 2/25/2019    Procedure: COLONOSCOPY;  Surgeon: Madhu Rose DO;  Location: [facility]; Service: Gastroenterology    ESOPHAGOGASTRODUODENOSCOPY N/A 2/22/2019    Procedure: ESOPHAGOGASTRODUODENOSCOPY (EGD)-roadshow overnight;  Surgeon: Madhu Rose DO;  [facility]; Service: Gastroenterology    ESOPHAGOGASTRODUODENOSCOPY N/A 2/23/2019    Procedure: ESOPHAGOGASTRODUODENOSCOPY (EGD); Surgeon: Rowan Garsia MD;  [facility]; Service: Gastroenterology    ESOPHAGOGASTRODUODENOSCOPY N/A 2/25/2019    Procedure: ESOPHAGOGASTRODUODENOSCOPY (EGD);   Surgeon: Rogelia Prader, DO;  [facility];   Service: Gastroenterology    IR CENTRAL LINE PLACEMENT  3/1/2019    IR CENTRAL LINE PLACEMENT  2/4/2019    IR [facility] EXCHANGE  4/18/2019    IR [facility] PLACEMENT  2/4/2019    IR [facility] PLACEMENT  2/18/2019    KNEE ARTHROSCOPY      KNEE CARTILAGE SURGERY Left     medial meniscus tear     TESTICLE SURGERY      TONSILLECTOMY AND ADENOIDECTOMY      TOOTH EXTRACTION         Current Outpatient Medications:     acetaminophen (TYLENOL) 325 mg tablet, Take 2 tablets (650 mg total) by mouth every 6 (six) hours as needed for mild pain, Disp: 30 tablet, Rfl: 0    aspirin (ECOTRIN LOW STRENGTH) 81 mg EC tablet, Take 1 tablet (81 mg total) by mouth daily, Disp: , Rfl: 0    atorvastatin (LIPITOR) 40 mg tablet, Take 1 tablet (40 mg total) by mouth daily with dinner, Disp: , Rfl: 0    b complex-vitamin C-folic acid (NEPHROCAPS) 1 mg capsule, Take 1 capsule by mouth daily with dinner, Disp: , Rfl: 0    bisacodyl (DULCOLAX) 10 mg suppository, Insert 1 suppository (10 mg total) into the rectum daily as needed for constipation, Disp: 12 suppository, Rfl: 0    bumetanide (BUMEX) 1 mg tablet, Take 1 mg by mouth, Disp: , Rfl:     Cholecalciferol (VITAMIN D3) 23090 units TABS, Take by mouth, Disp: , Rfl:     diltiazem (CARDIZEM CD) 180 mg 24 hr capsule, Take 1 capsule (180 mg total) by mouth daily, Disp: , Rfl: 0    hydrocortisone 1 % cream, Apply topically 2 (two) times a day as needed for rash, Disp: 30 g, Rfl: 0    magnesium oxide (MAG-OX) 400 mg, Take 1 tablet (400 mg total) by mouth daily, Disp: , Rfl: 0    melatonin 3 mg, Take 2 tablets (6 mg total) by mouth daily at bedtime, Disp: , Rfl: 0    metoprolol tartrate (LOPRESSOR) 50 mg tablet, Take 1 tablet (50 mg total) by mouth 3 (three) times a day, Disp: , Rfl: 0    midodrine (PROAMATINE) 10 MG tablet, Take 1 tablet (10 mg total) by mouth Before Dialysis (Hyponatremia associated with hemodialysis), Disp: , Rfl: 0    nystatin (MYCOSTATIN) powder, Apply topically 2 (two) times a day, Disp: 15 g, Rfl: 0    pantoprazole (PROTONIX) 40 mg tablet, Take 1 tablet (40 mg total) by mouth 2 (two) times a day before meals, Disp: , Rfl: 0    polyvinyl alcohol (LIQUIFILM TEARS) 1 4 % ophthalmic solution, Administer 1 drop to both eyes every 3 (three) hours as needed for dry eyes, Disp: 15 mL, Rfl: 0    POTASSIUM CHLORIDE PO, Take 40 mEq by mouth 2 (two) times a day, Disp: , Rfl:     sertraline (ZOLOFT) 25 mg tablet, Take 1 tablet (25 mg total) by mouth daily after dinner, Disp: , Rfl: 0    traMADol (ULTRAM) 50 mg tablet, Take 50 mg by mouth every 6 (six) hours as needed for moderate pain, Disp: , Rfl:   Allergies   Allergen Reactions    Amiodarone      Developed Rash    Penicillins Rash     However, has subsequently tolerated Cefazolin and Cefepime       Labs:     Chemistry        Component Value Date/Time     04/09/2015 1141    K 4 0 03/15/2019 0436    K 4 3 04/09/2015 1141     03/15/2019 0436     04/09/2015 1141    CO2 29 03/15/2019 0436    CO2 26 02/21/2019 2243    BUN 24 03/15/2019 0436    BUN 17 04/09/2015 1141    CREATININE 3 39 (H) 03/15/2019 0436    CREATININE 0 67 04/09/2015 1141        Component Value Date/Time    CALCIUM 7 8 (L) 03/15/2019 0436    CALCIUM 8 4 04/09/2015 1141    ALKPHOS 78 03/04/2019 0449    ALKPHOS 104 10/30/2014 1600    AST 10 03/04/2019 0449    AST 15 10/30/2014 1600    ALT <6 (L) 03/04/2019 0449    ALT 22 10/30/2014 1600    BILITOT 0 4 10/30/2014 1600            Lab Results   Component Value Date    CHOL 146 07/18/2014    CHOL 145 02/21/2014     Lab Results   Component Value Date    HDL 44 02/13/2019    HDL 41 06/02/2018    HDL 52 06/27/2017     Lab Results   Component Value Date    LDLCALC 77 02/13/2019    LDLCALC 57 06/02/2018    LDLCALC 129 (H) 06/27/2017     Lab Results   Component Value Date    TRIG 129 02/13/2019    TRIG 102 06/02/2018    TRIG 71 06/27/2017     No results found for: CHOLHDL    Imaging: No results found  ECG:  Sinus rhythm left bundle-branch block      Review of Systems   Constitution: Negative  HENT: Negative  Eyes: Negative  Cardiovascular: Positive for leg swelling  Respiratory: Negative  Endocrine: Negative  Hematologic/Lymphatic: Negative  Skin: Negative  Musculoskeletal: Negative  Gastrointestinal: Negative  Genitourinary: Negative  Neurological: Negative  Psychiatric/Behavioral: Negative  Vitals:    09/11/19 0945   BP: 140/68   Pulse: 65     Vitals:    09/11/19 0945   Weight: 114 kg (251 lb 4 8 oz)     Height: 5' 9" (175 3 cm)   Body mass index is 37 11 kg/m²      Physical Exam:  General:  Alert and cooperative, appears stated age  HEENT:  PERRLA, EOMI, no scleral icterus, no conjunctival pallor  Neck:  No lymphadenopathy, no thyromegaly, no carotid bruits, no elevated JVP  Heart:  Regular rate and rhythm, normal S1/S2, no S3/S4, no murmur  Lungs:  Clear to auscultation bilaterally   Abdomen:  Soft, non-tender, positive bowel sounds, no rebound or guarding,   no organomegaly   Extremities:  No clubbing, cyanosis or  +1edema   Vascular:  2+ pedal pulses  Skin:  No rashes or lesions on exposed skin  Neurologic:  Cranial nerves II-XII grossly intact without focal deficits

## 2019-09-18 ENCOUNTER — TELEPHONE (OUTPATIENT)
Dept: NEUROLOGY | Facility: CLINIC | Age: 59
End: 2019-09-18

## 2019-09-18 NOTE — TELEPHONE ENCOUNTER
Called nursing home and spoke with Nurse Jacky Hahn and she will be calling me back   [address] [place] [address] Drive

## 2020-01-22 NOTE — PROGRESS NOTES
Progress Note - Rachid Shore 60 y o  male MRN: [id number]    Unit/Bed#: [facility] 805-01 Encounter: [id number]      ASSESSMENT/ PLAN:     1  GI bleeding: s/p EGD x 3 and colonoscopy 2/25  Acute GI bleeding secondary to antral ulcer in setting of anticoagulation seen on EGD 2/22   Repeat EGD 2/23 and 2/25 with clean based ulcer in the antrum with no stigmata of recent bleeding  Given continued drop in Hgb, colonoscopy was also performed  This revealed ischemic colitis in sigmoid and descending colon  Biopsy revealed inflammation and confirmed ischemic colitis  No signs of active GI bleeding on EGD or colonoscopy 2/25  Hgb is stable at 8 2  No signs of active GI bleeding  We were asked to re-evaluate for anticoagulation in setting of non-occlusive acute deep vein thrombosis in internal jugular vein with history of large GI bleed  If anticoagulation is indicated from vascular and cardiology, would be okay from GI standpoint for anticoagulation regarding thrombosis  In regards to his a-fib and AVR, would recommend discussing with his outpatient cardiologist, Dr Simone Hadley, to discuss risk vs benefit  -monitor Hgb  -continue PPI BID x 8 weeks, then daily  -transfuse as necessary  -monitor color of stool   -okay from GI standpoint for anticoagulation for internal jugular vein thrombus if necessary  -if patient re-bleeds would likely need to repeat EGD vs colonoscopy      2  Ischemic Colitis: biopsy consistent with ischemic colitis   -avoid hypotension  -given poor prep, repeat colonoscopy in 6/8 weeks      3  Esophagitis and PUD:   -biopsy with inflammation, no h pylori   -continue PPI BID x 8 weeks, then daily  -repeat EGD in 6-8 weeks to ensure healing of ulcer      GI will sign off for now, please call with any further questions         Subjective:     Patient seen and examined   Denies abdominal pain, melena or hematochezia     Objective:     Vitals: Blood pressure 103/56, pulse 102, temperature 98 8 °F (37 1 °C), temperature source Axillary, resp  rate 22, height 5' 9" (1 753 m), weight (!) 162 kg (357 lb 9 4 oz), SpO2 100 %  ,Body mass index is 52 81 kg/m²  Intake/Output Summary (Last 24 hours) at 3/1/2019 1424  Last data filed at 3/1/2019 0854  Gross per 24 hour   Intake 1130 ml   Output 3414 ml   Net -2284 ml       Physical Exam:     General Appearance: A&Ox3, appears stated age and cooperative  Lungs: Clear to auscultation bilaterally, no rales or rhonchi  Heart: Regular rate and rhythm, S1, S2 normal, no murmur, click, rub or gallop  Abdomen: Soft, non-tender, non-distended; bowel sounds normal; no masses or no organomegaly  Extremities: No cyanosis, clubbing, + b/l LE edema    Invasive Devices     Peripheral Intravenous Line            Peripheral IV 02/28/19 Right Forearm less than 1 day          Hemodialysis Catheter            Permanent HD Catheter  11 days                Lab Results:    Results from last 7 days   Lab Units 03/01/19  0618   WBC Thousand/uL 12 78*   HEMOGLOBIN g/dL 8 2*   HEMATOCRIT % 26 8*   PLATELETS Thousands/uL 143*   NEUTROS PCT % 55   LYMPHS PCT % 5*   MONOS PCT % 8   EOS PCT % 20*     Results from last 7 days   Lab Units 03/01/19  0618   POTASSIUM mmol/L 3 2*   CHLORIDE mmol/L 106   CO2 mmol/L 29   BUN mg/dL 14   CREATININE mg/dL 2 19*   CALCIUM mg/dL 7 1*     Results from last 7 days   Lab Units 02/24/19  0559   INR  1 26*           Imaging Studies: I have personally reviewed pertinent imaging studies  Xr Chest Portable    Result Date: 1/30/2019  Impression: Slight progression of congestive heart failure  Fluid and/or infiltrate left lower lobe  Xr Chest Portable    Result Date: 1/27/2019  Impression: Cardiomegaly with stable vascular congestion and mildly increased left basilar opacity, likely atelectasis/pleural effusion  Workstation performed: [id number]     Xr Chest Portable    Result Date: 1/25/2019  Impression: Expected positioning of endotracheal and enteric tubes    A right internal jugular central venous catheter has been changed since previous examination with a new one being larger in caliber  No pneumothorax  Xr Chest Portable    Result Date: 1/24/2019  Impression: 1  Appropriately positioned left IJ catheter  No pneumothorax on either side  2   Otherwise, unchanged lines and tubes  3   Persistent pulmonary vascular congestion and atelectasis in the lingula and left lower lobe  Xr Chest Portable    Result Date: 1/24/2019  Impression: FINDINGS/IMPRESSION: 1  Right IJ catheter tip is superficial in location projecting over the region of the inferior right jugular vein  Recommend replacement  2   Appropriately positioned endotracheal tube  3   Unchanged enteric tube with side-port at the distal esophagus  4   Unchanged low-grade alveolar edema and left midlung platelike atelectasis  5   Unchanged heart and megaly and pulmonary vascular congestion  The study was marked in EPIC for significant notification  Xr Chest Portable    Result Date: 1/24/2019  Impression: 1  Endotracheal tube tip appears slightly retracted from prior  However, tip remains below the clavicles in the thoracic trachea  2   Alveolar edema appears decreased, but there are new platelike opacities in the left midlung field with atelectasis  3   Enteric tube tip in the stomach with side-port in distal esophagus  If the tube is being used for decompression, positioning is adequate  If the tube is being used for feeding, recommend advancing by 12 cm  4   No pneumothorax  No

## 2020-03-11 ENCOUNTER — OFFICE VISIT (OUTPATIENT)
Dept: CARDIOLOGY CLINIC | Facility: HOSPITAL | Age: 60
End: 2020-03-11
Payer: COMMERCIAL

## 2020-03-11 VITALS
HEART RATE: 60 BPM | WEIGHT: 294.2 LBS | SYSTOLIC BLOOD PRESSURE: 124 MMHG | BODY MASS INDEX: 43.58 KG/M2 | DIASTOLIC BLOOD PRESSURE: 62 MMHG | HEIGHT: 69 IN

## 2020-03-11 DIAGNOSIS — I51.81 STRESS-INDUCED CARDIOMYOPATHY: Primary | ICD-10-CM

## 2020-03-11 DIAGNOSIS — E78.2 MIXED HYPERLIPIDEMIA: Chronic | ICD-10-CM

## 2020-03-11 DIAGNOSIS — I63.9 CEREBROVASCULAR ACCIDENT (CVA), UNSPECIFIED MECHANISM (HCC): ICD-10-CM

## 2020-03-11 DIAGNOSIS — I44.7 LEFT BUNDLE BRANCH BLOCK: Chronic | ICD-10-CM

## 2020-03-11 DIAGNOSIS — I31.9 PERICARDIAL DISEASE: ICD-10-CM

## 2020-03-11 DIAGNOSIS — I10 ESSENTIAL HYPERTENSION: Chronic | ICD-10-CM

## 2020-03-11 DIAGNOSIS — I48.19 PERSISTENT ATRIAL FIBRILLATION (HCC): ICD-10-CM

## 2020-03-11 PROCEDURE — 1036F TOBACCO NON-USER: CPT | Performed by: INTERNAL MEDICINE

## 2020-03-11 PROCEDURE — 3078F DIAST BP <80 MM HG: CPT | Performed by: INTERNAL MEDICINE

## 2020-03-11 PROCEDURE — 99214 OFFICE O/P EST MOD 30 MIN: CPT | Performed by: INTERNAL MEDICINE

## 2020-03-11 PROCEDURE — 3008F BODY MASS INDEX DOCD: CPT | Performed by: INTERNAL MEDICINE

## 2020-03-11 PROCEDURE — 93000 ELECTROCARDIOGRAM COMPLETE: CPT | Performed by: INTERNAL MEDICINE

## 2020-03-11 PROCEDURE — 3074F SYST BP LT 130 MM HG: CPT | Performed by: INTERNAL MEDICINE

## 2020-03-11 NOTE — PROGRESS NOTES
Cardiology Follow Up    Nuha Rojass  1960  [id number]  [address] [place] [address]    1  Stress-induced cardiomyopathy     2  Cerebrovascular accident (CVA), unspecified mechanism ([address] )     3  Essential hypertension     4  Left bundle branch block     5  Pericardial disease     6  Persistent atrial fibrillation     7  Mixed hyperlipidemia         Discussion/Summary:  He continues to remain quite debilitated  He is not able to walk on his own  This has been a big limiting factors rehabilitation following stroke  From a heart failure standpoint he has been euvolemic  EKG today shows sinus rhythm left bundle no AFib  Blood pressure is controlled  He has not been on anticoagulation secondary to severe gastrointestinal bleeding  We talked about a Watchman device in the past   He does not want any invasive procedures at this time  He is continuing to maintain sinus rhythm  He is due for an echocardiogram the summer  Interval History:  Routine follow-up visit  Think the hospital stay referenced above  Patient offers minimal complaints other than his inability to ambulate  He has weakness from the stroke but says he was doing well with therapy and then things and halted  Overall things have not changed much since her last visit  He continues to remain to nursing facility he does not get out of the bed much at all  He is able to get to a chair with a lift device  He denies any chest pain, shortness of breath, palpitations, lightheadedness, dizziness, or syncope  There has been no significant change in mild chronic lower extremity edema  Problem List     Aortic stenosis, mild    Overview Signed 4/11/2018  7:59 AM by Martha Holman DO     Description: Severe  ECHO DONE 6-2014  SEVERE AS  PEAK GRADIENT-65mmHg  mean gradient was 38mmHg  MARY-0 7cm2  mild AI           Essential hypertension    Hyperlipidemia    Left bundle branch block        Past Medical History:   Diagnosis Date    Allergic rhinitis     last assessed 9/12/12    Finger fracture, right     Closed fx of the middle phalanx of the right 5th finger  last assessed 1/30/14    Hemorrhoids     last assessed 2/10/14    Hyperlipidemia     Hypertension     Stroke [facility])      Social History     Socioeconomic History    Marital status: /Civil Union     Spouse name: Not on file    Number of children: Not on file    Years of education: Not on file    Highest education level: Not on file   Occupational History    Not on file   Social Needs    Financial resource strain: Not on file    Food insecurity:     Worry: Not on file     Inability: Not on file    Transportation needs:     Medical: Not on file     Non-medical: Not on file   Tobacco Use    Smoking status: Never Smoker    Smokeless tobacco: Never Used   Substance and Sexual Activity    Alcohol use: Never     Frequency: Never     Comment: ocassion /never drank alcohol per [employer]     Drug use: No    Sexual activity: Not on file   Lifestyle    Physical activity:     Days per week: Not on file     Minutes per session: Not on file    Stress: Not on file   Relationships    Social connections:     Talks on phone: Not on file     Gets together: Not on file     Attends [employer] service: Not on file     Active member of club or organization: Not on file     Attends meetings of clubs or organizations: Not on file     Relationship status: Not on file    Intimate partner violence:     Fear of current or ex partner: Not on file     Emotionally abused: Not on file     Physically abused: Not on file     Forced sexual activity: Not on file   Other Topics Concern    Not on file   Social History Narrative    Not on file      Family History   Problem Relation Age of Onset    Lung cancer Mother     Heart disease Mother pacemaker placement     Coronary artery disease Father     Hypertension Father     Heart attack Father     Cancer Family         bladder      Past Surgical History:   Procedure Laterality Date    AORTIC VALVE REPLACEMENT  07/30/2014    AVR with 25mm [facility] Ease bovine pericardial valve    CARDIAC CATHETERIZATION  07/18/2014    SLB left main-normal, circumflex-normal, ramus intermedius-normal, RCA was large and dominant giving rise to the PDA & a large posterolateral branch  No disease  last assessed 8/19/14     COLONOSCOPY N/A 2/25/2019    Procedure: COLONOSCOPY;  Surgeon: Terell Gray DO;  Location: [facility]; Service: Gastroenterology    ESOPHAGOGASTRODUODENOSCOPY N/A 2/22/2019    Procedure: ESOPHAGOGASTRODUODENOSCOPY (EGD)-roadshow overnight;  Surgeon: Terell Gray DO;  [facility]; Service: Gastroenterology    ESOPHAGOGASTRODUODENOSCOPY N/A 2/23/2019    Procedure: ESOPHAGOGASTRODUODENOSCOPY (EGD); Surgeon: Amy Williamson MD;  [facility]; Service: Gastroenterology    ESOPHAGOGASTRODUODENOSCOPY N/A 2/25/2019    Procedure: ESOPHAGOGASTRODUODENOSCOPY (EGD); Surgeon: Terell Gray DO;  [facility];   Service: Gastroenterology    IR CENTRAL LINE PLACEMENT  3/1/2019    IR CENTRAL LINE PLACEMENT  2/4/2019    IR [facility] EXCHANGE  4/18/2019    IR [facility] PLACEMENT  2/4/2019    IR PERMACATH PLACEMENT  2/18/2019    KNEE ARTHROSCOPY      KNEE CARTILAGE SURGERY Left     medial meniscus tear     TESTICLE SURGERY      TONSILLECTOMY AND ADENOIDECTOMY      TOOTH EXTRACTION         Current Outpatient Medications:     acetaminophen (TYLENOL) 325 mg tablet, Take 2 tablets (650 mg total) by mouth every 6 (six) hours as needed for mild pain, Disp: 30 tablet, Rfl: 0    aspirin (ECOTRIN LOW STRENGTH) 81 mg EC tablet, Take 1 tablet (81 mg total) by mouth daily, Disp: , Rfl: 0    atorvastatin (LIPITOR) 40 mg tablet, Take 1 tablet (40 mg total) by mouth daily with dinner, Disp: , Rfl: 0    b complex-vitamin C-folic acid (NEPHROCAPS) 1 mg capsule, Take 1 capsule by mouth daily with dinner, Disp: , Rfl: 0    bisacodyl (DULCOLAX) 10 mg suppository, Insert 1 suppository (10 mg total) into the rectum daily as needed for constipation, Disp: 12 suppository, Rfl: 0    bumetanide (BUMEX) 1 mg tablet, Take 2 mg by mouth daily , Disp: , Rfl:     Cholecalciferol (VITAMIN D3) 89968 units TABS, Take by mouth, Disp: , Rfl:     diltiazem (CARDIZEM CD) 180 mg 24 hr capsule, Take 1 capsule (180 mg total) by mouth daily, Disp: , Rfl: 0    hydrocortisone 1 % cream, Apply topically 2 (two) times a day as needed for rash, Disp: 30 g, Rfl: 0    magnesium oxide (MAG-OX) 400 mg, Take 1 tablet (400 mg total) by mouth daily, Disp: , Rfl: 0    melatonin 3 mg, Take 2 tablets (6 mg total) by mouth daily at bedtime, Disp: , Rfl: 0    metoprolol tartrate (LOPRESSOR) 50 mg tablet, Take 1 tablet (50 mg total) by mouth 3 (three) times a day, Disp: , Rfl: 0    nystatin (MYCOSTATIN) powder, Apply topically 2 (two) times a day, Disp: 15 g, Rfl: 0    pantoprazole (PROTONIX) 40 mg tablet, Take 1 tablet (40 mg total) by mouth 2 (two) times a day before meals, Disp: , Rfl: 0    polyvinyl alcohol (LIQUIFILM TEARS) 1 4 % ophthalmic solution, Administer 1 drop to both eyes every 3 (three) hours as needed for dry eyes, Disp: 15 mL, Rfl: 0    POTASSIUM CHLORIDE PO, Take 40 mEq by mouth 2 (two) times a day, Disp: , Rfl:     Probiotic Product (BACID PO), Take by mouth, Disp: , Rfl:     sertraline (ZOLOFT) 25 mg tablet, Take 1 tablet (25 mg total) by mouth daily after dinner, Disp: , Rfl: 0    midodrine (PROAMATINE) 10 MG tablet, Take 1 tablet (10 mg total) by mouth Before Dialysis (Hyponatremia associated with hemodialysis) (Patient not taking: Reported on 3/11/2020), Disp: , Rfl: 0    traMADol (ULTRAM) 50 mg tablet, Take 50 mg by mouth every 6 (six) hours as needed for moderate pain, Disp: , Rfl: Allergies   Allergen Reactions    Amiodarone      Developed Rash    Penicillins Rash     However, has subsequently tolerated Cefazolin and Cefepime       Labs:     Chemistry        Component Value Date/Time     04/09/2015 1141    K 4 0 03/15/2019 0436    K 4 3 04/09/2015 1141     03/15/2019 0436     04/09/2015 1141    CO2 29 03/15/2019 0436    CO2 26 02/21/2019 2243    BUN 24 03/15/2019 0436    BUN 17 04/09/2015 1141    CREATININE 3 39 (H) 03/15/2019 0436    CREATININE 0 67 04/09/2015 1141        Component Value Date/Time    CALCIUM 7 8 (L) 03/15/2019 0436    CALCIUM 8 4 04/09/2015 1141    ALKPHOS 78 03/04/2019 0449    ALKPHOS 104 10/30/2014 1600    AST 10 03/04/2019 0449    AST 15 10/30/2014 1600    ALT <6 (L) 03/04/2019 0449    ALT 22 10/30/2014 1600    BILITOT 0 4 10/30/2014 1600            Lab Results   Component Value Date    CHOL 146 07/18/2014    CHOL 145 02/21/2014     Lab Results   Component Value Date    HDL 44 02/13/2019    HDL 41 06/02/2018    HDL 52 06/27/2017     Lab Results   Component Value Date    LDLCALC 77 02/13/2019    LDLCALC 57 06/02/2018    LDLCALC 129 (H) 06/27/2017     Lab Results   Component Value Date    TRIG 129 02/13/2019    TRIG 102 06/02/2018    TRIG 71 06/27/2017     No results found for: CHOLHDL    Imaging: No results found  ECG:  Sinus rhythm left bundle-branch block      Review of Systems   Constitution: Negative  HENT: Negative  Eyes: Negative  Cardiovascular: Positive for leg swelling  Respiratory: Negative  Endocrine: Negative  Hematologic/Lymphatic: Negative  Skin: Negative  Musculoskeletal: Negative  Gastrointestinal: Negative  Genitourinary: Negative  Neurological: Negative  Psychiatric/Behavioral: Negative  Vitals:    03/11/20 1218   BP: 124/62   Pulse: 60     Vitals:    03/11/20 1218   Weight: 133 kg (294 lb 3 2 oz)     Height: 5' 9" (175 3 cm)   Body mass index is 43 45 kg/m²      Physical Exam:  Vital signs reviewed  General:  Alert and cooperative, appears stated age, no acute distress  HEENT:  PERRLA, EOMI, no scleral icterus, no conjunctival pallor  Neck:  No lymphadenopathy, no thyromegaly, no carotid bruits, no elevated JVP  Heart:  Regular rate and rhythm, normal S1/S2, no S3/S4, no murmur, rubs or gallops  PMI nondisplaced  Lungs:  Clear to auscultation bilaterally, no wheezes rales or rhonchi  Abdomen:  Soft, non-tender, positive bowel sounds, no rebound or guarding,   no organomegaly   Extremities:  Normal range of motion    No clubbing, cyanosis or edema   Vascular:  2+ pedal pulses  Skin:  No rashes or lesions on exposed skin  Neurologic:  Cranial nerves II-XII grossly intact without focal deficits

## 2020-03-12 DIAGNOSIS — Z95.3 HISTORY OF AORTIC VALVE REPLACEMENT WITH BIOPROSTHETIC VALVE: Primary | Chronic | ICD-10-CM

## 2020-07-06 ENCOUNTER — HOSPITAL ENCOUNTER (OUTPATIENT)
Dept: NON INVASIVE DIAGNOSTICS | Facility: HOSPITAL | Age: 60
Discharge: HOME | End: 2020-07-06
Attending: INTERNAL MEDICINE
Payer: COMMERCIAL

## 2020-07-06 DIAGNOSIS — Z95.3 HISTORY OF AORTIC VALVE REPLACEMENT WITH BIOPROSTHETIC VALVE: Chronic | ICD-10-CM

## 2020-07-06 PROCEDURE — 93306 TTE W/DOPPLER COMPLETE: CPT | Performed by: INTERNAL MEDICINE

## 2020-07-06 PROCEDURE — 93306 TTE W/DOPPLER COMPLETE: CPT

## 2020-10-22 ENCOUNTER — OFFICE VISIT (OUTPATIENT)
Dept: CARDIOLOGY CLINIC | Facility: HOSPITAL | Age: 60
End: 2020-10-22
Payer: COMMERCIAL

## 2020-10-22 VITALS
SYSTOLIC BLOOD PRESSURE: 116 MMHG | HEIGHT: 69 IN | WEIGHT: 315 LBS | HEART RATE: 67 BPM | DIASTOLIC BLOOD PRESSURE: 68 MMHG | BODY MASS INDEX: 46.65 KG/M2

## 2020-10-22 DIAGNOSIS — Z95.3 HISTORY OF AORTIC VALVE REPLACEMENT WITH BIOPROSTHETIC VALVE: Chronic | ICD-10-CM

## 2020-10-22 DIAGNOSIS — E78.2 MIXED HYPERLIPIDEMIA: Chronic | ICD-10-CM

## 2020-10-22 DIAGNOSIS — I10 ESSENTIAL HYPERTENSION: Primary | Chronic | ICD-10-CM

## 2020-10-22 DIAGNOSIS — I44.7 LEFT BUNDLE BRANCH BLOCK: Chronic | ICD-10-CM

## 2020-10-22 DIAGNOSIS — I63.9 CEREBROVASCULAR ACCIDENT (CVA), UNSPECIFIED MECHANISM (HCC): ICD-10-CM

## 2020-10-22 DIAGNOSIS — E66.01 MORBID OBESITY (HCC): ICD-10-CM

## 2020-10-22 PROCEDURE — 99214 OFFICE O/P EST MOD 30 MIN: CPT | Performed by: INTERNAL MEDICINE

## 2020-10-23 ENCOUNTER — TRANSCRIBE ORDERS (OUTPATIENT)
Dept: ADMINISTRATIVE | Facility: HOSPITAL | Age: 60
End: 2020-10-23

## 2021-01-17 ENCOUNTER — HOSPITAL ENCOUNTER (INPATIENT)
Facility: HOSPITAL | Age: 61
LOS: 46 days | Discharge: NURSING, REHAB OR LONG-TERM CARE | DRG: 130 | End: 2021-03-04
Attending: INTERNAL MEDICINE | Admitting: INTERNAL MEDICINE
Payer: COMMERCIAL

## 2021-01-17 ENCOUNTER — APPOINTMENT (EMERGENCY)
Dept: CT IMAGING | Facility: HOSPITAL | Age: 61
End: 2021-01-17
Payer: COMMERCIAL

## 2021-01-17 ENCOUNTER — HOSPITAL ENCOUNTER (EMERGENCY)
Facility: HOSPITAL | Age: 61
End: 2021-01-17
Attending: EMERGENCY MEDICINE | Admitting: EMERGENCY MEDICINE
Payer: COMMERCIAL

## 2021-01-17 ENCOUNTER — APPOINTMENT (EMERGENCY)
Dept: RADIOLOGY | Facility: HOSPITAL | Age: 61
End: 2021-01-17
Payer: COMMERCIAL

## 2021-01-17 VITALS
RESPIRATION RATE: 24 BRPM | OXYGEN SATURATION: 94 % | HEART RATE: 77 BPM | DIASTOLIC BLOOD PRESSURE: 63 MMHG | TEMPERATURE: 100.4 F | SYSTOLIC BLOOD PRESSURE: 100 MMHG

## 2021-01-17 DIAGNOSIS — I35.0 AORTIC STENOSIS, MILD: Primary | Chronic | ICD-10-CM

## 2021-01-17 DIAGNOSIS — U07.1 ACUTE HYPOXEMIC RESPIRATORY FAILURE DUE TO COVID-19 (HCC): Primary | ICD-10-CM

## 2021-01-17 DIAGNOSIS — R56.9 SEIZURE (HCC): ICD-10-CM

## 2021-01-17 DIAGNOSIS — Z95.4: ICD-10-CM

## 2021-01-17 DIAGNOSIS — R45.851 PASSIVE SUICIDAL IDEATIONS: ICD-10-CM

## 2021-01-17 DIAGNOSIS — U07.1 PNEUMONIA DUE TO COVID-19 VIRUS: ICD-10-CM

## 2021-01-17 DIAGNOSIS — G47.33 OSA (OBSTRUCTIVE SLEEP APNEA): ICD-10-CM

## 2021-01-17 DIAGNOSIS — R78.81 BACTEREMIA: ICD-10-CM

## 2021-01-17 DIAGNOSIS — R41.82 ALTERED MENTAL STATUS: ICD-10-CM

## 2021-01-17 DIAGNOSIS — J96.01 ACUTE HYPOXEMIC RESPIRATORY FAILURE DUE TO COVID-19 (HCC): Primary | ICD-10-CM

## 2021-01-17 DIAGNOSIS — R21 RASH AND NONSPECIFIC SKIN ERUPTION: ICD-10-CM

## 2021-01-17 DIAGNOSIS — J12.82 PNEUMONIA DUE TO COVID-19 VIRUS: ICD-10-CM

## 2021-01-17 DIAGNOSIS — N17.9 AKI (ACUTE KIDNEY INJURY) (HCC): ICD-10-CM

## 2021-01-17 DIAGNOSIS — E87.6 HYPOKALEMIA: ICD-10-CM

## 2021-01-17 DIAGNOSIS — J96.01 ACUTE RESPIRATORY FAILURE WITH HYPOXIA AND HYPERCARBIA (HCC): ICD-10-CM

## 2021-01-17 DIAGNOSIS — J96.02 ACUTE RESPIRATORY FAILURE WITH HYPOXIA AND HYPERCARBIA (HCC): ICD-10-CM

## 2021-01-17 DIAGNOSIS — R06.89 HYPERCAPNIA: ICD-10-CM

## 2021-01-17 DIAGNOSIS — R45.851 SUICIDAL IDEATION: ICD-10-CM

## 2021-01-17 LAB
ABO GROUP BLD: NORMAL
ALBUMIN SERPL BCP-MCNC: 3.3 G/DL (ref 3.5–5)
ALP SERPL-CCNC: 101 U/L (ref 46–116)
ALT SERPL W P-5'-P-CCNC: 26 U/L (ref 12–78)
ANION GAP SERPL CALCULATED.3IONS-SCNC: 6 MMOL/L (ref 4–13)
APTT PPP: 33 SECONDS (ref 23–37)
ARTERIAL PATENCY WRIST A: YES
AST SERPL W P-5'-P-CCNC: 20 U/L (ref 5–45)
ATRIAL RATE: 71 BPM
BASE EX.OXY STD BLDV CALC-SCNC: 94.5 % (ref 60–80)
BASE EXCESS BLDA CALC-SCNC: 0.5 MMOL/L
BASE EXCESS BLDV CALC-SCNC: -0.7 MMOL/L
BASOPHILS # BLD AUTO: 0.03 THOUSANDS/ΜL (ref 0–0.1)
BASOPHILS NFR BLD AUTO: 0 % (ref 0–1)
BILIRUB SERPL-MCNC: 0.2 MG/DL (ref 0.2–1)
BLD GP AB SCN SERPL QL: NEGATIVE
BUN SERPL-MCNC: 24 MG/DL (ref 5–25)
CALCIUM ALBUM COR SERPL-MCNC: 8.9 MG/DL (ref 8.3–10.1)
CALCIUM SERPL-MCNC: 8.3 MG/DL (ref 8.3–10.1)
CHLORIDE SERPL-SCNC: 104 MMOL/L (ref 100–108)
CK SERPL-CCNC: 76 U/L (ref 39–308)
CO2 SERPL-SCNC: 33 MMOL/L (ref 21–32)
CREAT SERPL-MCNC: 1.34 MG/DL (ref 0.6–1.3)
CRP SERPL QL: 28.8 MG/L
D DIMER PPP FEU-MCNC: 1.15 UG/ML FEU
EOSINOPHIL # BLD AUTO: 0 THOUSAND/ΜL (ref 0–0.61)
EOSINOPHIL NFR BLD AUTO: 0 % (ref 0–6)
ERYTHROCYTE [DISTWIDTH] IN BLOOD BY AUTOMATED COUNT: 17.5 % (ref 11.6–15.1)
FERRITIN SERPL-MCNC: 21 NG/ML (ref 8–388)
FLUAV RNA RESP QL NAA+PROBE: NEGATIVE
FLUBV RNA RESP QL NAA+PROBE: NEGATIVE
GFR SERPL CREATININE-BSD FRML MDRD: 57 ML/MIN/1.73SQ M
GLUCOSE SERPL-MCNC: 114 MG/DL (ref 65–140)
HCO3 BLDA-SCNC: 26.8 MMOL/L (ref 22–28)
HCO3 BLDV-SCNC: 31.6 MMOL/L (ref 24–30)
HCT VFR BLD AUTO: 45.4 % (ref 36.5–49.3)
HGB BLD-MCNC: 12.5 G/DL (ref 12–17)
HIV 1+2 AB+HIV1 P24 AG SERPL QL IA: NORMAL
HIV1 P24 AG SER QL: NORMAL
HOROWITZ INDEX BLDA+IHG-RTO: 100 MM[HG]
IMM GRANULOCYTES # BLD AUTO: 0.04 THOUSAND/UL (ref 0–0.2)
IMM GRANULOCYTES NFR BLD AUTO: 1 % (ref 0–2)
INR PPP: 1.04 (ref 0.84–1.19)
LACTATE SERPL-SCNC: 0.4 MMOL/L (ref 0.5–2)
LYMPHOCYTES # BLD AUTO: 0.47 THOUSANDS/ΜL (ref 0.6–4.47)
LYMPHOCYTES NFR BLD AUTO: 6 % (ref 14–44)
MAGNESIUM SERPL-MCNC: 2.4 MG/DL (ref 1.6–2.6)
MCH RBC QN AUTO: 23.4 PG (ref 26.8–34.3)
MCHC RBC AUTO-ENTMCNC: 27.5 G/DL (ref 31.4–37.4)
MCV RBC AUTO: 85 FL (ref 82–98)
MONOCYTES # BLD AUTO: 0.95 THOUSAND/ΜL (ref 0.17–1.22)
MONOCYTES NFR BLD AUTO: 13 % (ref 4–12)
NEUTROPHILS # BLD AUTO: 5.84 THOUSANDS/ΜL (ref 1.85–7.62)
NEUTS SEG NFR BLD AUTO: 80 % (ref 43–75)
NRBC BLD AUTO-RTO: 0 /100 WBCS
NT-PROBNP SERPL-MCNC: 3765 PG/ML
O2 CT BLDA-SCNC: 17.5 ML/DL (ref 16–23)
O2 CT BLDV-SCNC: 17.8 ML/DL
OXYHGB MFR BLDA: 98.2 % (ref 94–97)
P AXIS: 44 DEGREES
PCO2 BLDA: 50.5 MM HG (ref 36–44)
PCO2 BLDV: 101.1 MM HG (ref 42–50)
PEEP RESPIRATORY: 10 CM[H2O]
PH BLDA: 7.34 [PH] (ref 7.35–7.45)
PH BLDV: 7.11 [PH] (ref 7.3–7.4)
PLATELET # BLD AUTO: 172 THOUSANDS/UL (ref 149–390)
PLATELET # BLD AUTO: 181 THOUSANDS/UL (ref 149–390)
PMV BLD AUTO: 11 FL (ref 8.9–12.7)
PMV BLD AUTO: 11.4 FL (ref 8.9–12.7)
PO2 BLDA: 280 MM HG (ref 75–129)
PO2 BLDV: 103.9 MM HG (ref 35–45)
POTASSIUM SERPL-SCNC: 4.9 MMOL/L (ref 3.5–5.3)
PR INTERVAL: 214 MS
PROCALCITONIN SERPL-MCNC: 0.17 NG/ML
PROT SERPL-MCNC: 7.7 G/DL (ref 6.4–8.2)
PROTHROMBIN TIME: 13.4 SECONDS (ref 11.6–14.5)
QRS AXIS: 121 DEGREES
QRSD INTERVAL: 150 MS
QT INTERVAL: 428 MS
QTC INTERVAL: 465 MS
RBC # BLD AUTO: 5.35 MILLION/UL (ref 3.88–5.62)
RH BLD: POSITIVE
RSV RNA RESP QL NAA+PROBE: NEGATIVE
SARS-COV-2 RNA RESP QL NAA+PROBE: POSITIVE
SODIUM SERPL-SCNC: 143 MMOL/L (ref 136–145)
SPECIMEN EXPIRATION DATE: NORMAL
SPECIMEN SOURCE: ABNORMAL
T WAVE AXIS: -8 DEGREES
TROPONIN I SERPL-MCNC: 0.02 NG/ML
VENT AC: 22
VENT- AC: AC
VENTRICULAR RATE: 71 BPM
VT SETTING VENT: 500 ML
WBC # BLD AUTO: 7.33 THOUSAND/UL (ref 4.31–10.16)

## 2021-01-17 PROCEDURE — 93005 ELECTROCARDIOGRAM TRACING: CPT

## 2021-01-17 PROCEDURE — 82728 ASSAY OF FERRITIN: CPT | Performed by: PHYSICIAN ASSISTANT

## 2021-01-17 PROCEDURE — 87040 BLOOD CULTURE FOR BACTERIA: CPT | Performed by: EMERGENCY MEDICINE

## 2021-01-17 PROCEDURE — 31500 INSERT EMERGENCY AIRWAY: CPT | Performed by: EMERGENCY MEDICINE

## 2021-01-17 PROCEDURE — 80053 COMPREHEN METABOLIC PANEL: CPT | Performed by: EMERGENCY MEDICINE

## 2021-01-17 PROCEDURE — 87077 CULTURE AEROBIC IDENTIFY: CPT | Performed by: EMERGENCY MEDICINE

## 2021-01-17 PROCEDURE — 86705 HEP B CORE ANTIBODY IGM: CPT | Performed by: PHYSICIAN ASSISTANT

## 2021-01-17 PROCEDURE — 96375 TX/PRO/DX INJ NEW DRUG ADDON: CPT

## 2021-01-17 PROCEDURE — 94762 N-INVAS EAR/PLS OXIMTRY CONT: CPT

## 2021-01-17 PROCEDURE — 93010 ELECTROCARDIOGRAM REPORT: CPT | Performed by: INTERNAL MEDICINE

## 2021-01-17 PROCEDURE — 94150 VITAL CAPACITY TEST: CPT

## 2021-01-17 PROCEDURE — 94760 N-INVAS EAR/PLS OXIMETRY 1: CPT

## 2021-01-17 PROCEDURE — 85610 PROTHROMBIN TIME: CPT | Performed by: EMERGENCY MEDICINE

## 2021-01-17 PROCEDURE — 74177 CT ABD & PELVIS W/CONTRAST: CPT

## 2021-01-17 PROCEDURE — 99292 CRITICAL CARE ADDL 30 MIN: CPT | Performed by: EMERGENCY MEDICINE

## 2021-01-17 PROCEDURE — 87340 HEPATITIS B SURFACE AG IA: CPT | Performed by: PHYSICIAN ASSISTANT

## 2021-01-17 PROCEDURE — 82948 REAGENT STRIP/BLOOD GLUCOSE: CPT

## 2021-01-17 PROCEDURE — 85379 FIBRIN DEGRADATION QUANT: CPT | Performed by: PHYSICIAN ASSISTANT

## 2021-01-17 PROCEDURE — G1004 CDSM NDSC: HCPCS

## 2021-01-17 PROCEDURE — 84484 ASSAY OF TROPONIN QUANT: CPT | Performed by: EMERGENCY MEDICINE

## 2021-01-17 PROCEDURE — 87040 BLOOD CULTURE FOR BACTERIA: CPT | Performed by: PHYSICIAN ASSISTANT

## 2021-01-17 PROCEDURE — 94002 VENT MGMT INPAT INIT DAY: CPT

## 2021-01-17 PROCEDURE — 85730 THROMBOPLASTIN TIME PARTIAL: CPT | Performed by: EMERGENCY MEDICINE

## 2021-01-17 PROCEDURE — 96361 HYDRATE IV INFUSION ADD-ON: CPT

## 2021-01-17 PROCEDURE — 83605 ASSAY OF LACTIC ACID: CPT | Performed by: EMERGENCY MEDICINE

## 2021-01-17 PROCEDURE — 99291 CRITICAL CARE FIRST HOUR: CPT | Performed by: EMERGENCY MEDICINE

## 2021-01-17 PROCEDURE — 5A1935Z RESPIRATORY VENTILATION, LESS THAN 24 CONSECUTIVE HOURS: ICD-10-PCS | Performed by: INTERNAL MEDICINE

## 2021-01-17 PROCEDURE — 85049 AUTOMATED PLATELET COUNT: CPT | Performed by: PHYSICIAN ASSISTANT

## 2021-01-17 PROCEDURE — 36556 INSERT NON-TUNNEL CV CATH: CPT | Performed by: EMERGENCY MEDICINE

## 2021-01-17 PROCEDURE — 82550 ASSAY OF CK (CPK): CPT | Performed by: PHYSICIAN ASSISTANT

## 2021-01-17 PROCEDURE — 71275 CT ANGIOGRAPHY CHEST: CPT

## 2021-01-17 PROCEDURE — 86900 BLOOD TYPING SEROLOGIC ABO: CPT | Performed by: PHYSICIAN ASSISTANT

## 2021-01-17 PROCEDURE — 96366 THER/PROPH/DIAG IV INF ADDON: CPT

## 2021-01-17 PROCEDURE — 70450 CT HEAD/BRAIN W/O DYE: CPT

## 2021-01-17 PROCEDURE — 96365 THER/PROPH/DIAG IV INF INIT: CPT

## 2021-01-17 PROCEDURE — 86901 BLOOD TYPING SEROLOGIC RH(D): CPT | Performed by: PHYSICIAN ASSISTANT

## 2021-01-17 PROCEDURE — 86704 HEP B CORE ANTIBODY TOTAL: CPT | Performed by: PHYSICIAN ASSISTANT

## 2021-01-17 PROCEDURE — 71045 X-RAY EXAM CHEST 1 VIEW: CPT

## 2021-01-17 PROCEDURE — 83735 ASSAY OF MAGNESIUM: CPT | Performed by: EMERGENCY MEDICINE

## 2021-01-17 PROCEDURE — 85025 COMPLETE CBC W/AUTO DIFF WBC: CPT | Performed by: EMERGENCY MEDICINE

## 2021-01-17 PROCEDURE — 84145 PROCALCITONIN (PCT): CPT | Performed by: EMERGENCY MEDICINE

## 2021-01-17 PROCEDURE — 82805 BLOOD GASES W/O2 SATURATION: CPT | Performed by: EMERGENCY MEDICINE

## 2021-01-17 PROCEDURE — 86850 RBC ANTIBODY SCREEN: CPT | Performed by: PHYSICIAN ASSISTANT

## 2021-01-17 PROCEDURE — 86140 C-REACTIVE PROTEIN: CPT | Performed by: PHYSICIAN ASSISTANT

## 2021-01-17 PROCEDURE — 0241U HB NFCT DS VIR RESP RNA 4 TRGT: CPT | Performed by: EMERGENCY MEDICINE

## 2021-01-17 PROCEDURE — 86803 HEPATITIS C AB TEST: CPT | Performed by: PHYSICIAN ASSISTANT

## 2021-01-17 PROCEDURE — 99291 CRITICAL CARE FIRST HOUR: CPT | Performed by: INTERNAL MEDICINE

## 2021-01-17 PROCEDURE — 83880 ASSAY OF NATRIURETIC PEPTIDE: CPT | Performed by: PHYSICIAN ASSISTANT

## 2021-01-17 PROCEDURE — 87806 HIV AG W/HIV1&2 ANTB W/OPTIC: CPT | Performed by: PHYSICIAN ASSISTANT

## 2021-01-17 PROCEDURE — 87077 CULTURE AEROBIC IDENTIFY: CPT | Performed by: PHYSICIAN ASSISTANT

## 2021-01-17 PROCEDURE — 99291 CRITICAL CARE FIRST HOUR: CPT

## 2021-01-17 PROCEDURE — 87186 SC STD MICRODIL/AGAR DIL: CPT | Performed by: PHYSICIAN ASSISTANT

## 2021-01-17 PROCEDURE — 36600 WITHDRAWAL OF ARTERIAL BLOOD: CPT

## 2021-01-17 RX ORDER — LEVALBUTEROL 1.25 MG/.5ML
1.25 SOLUTION, CONCENTRATE RESPIRATORY (INHALATION)
Status: DISCONTINUED | OUTPATIENT
Start: 2021-01-17 | End: 2021-01-17

## 2021-01-17 RX ORDER — ASCORBIC ACID 500 MG
1000 TABLET ORAL EVERY 12 HOURS SCHEDULED
Status: COMPLETED | OUTPATIENT
Start: 2021-01-17 | End: 2021-01-24

## 2021-01-17 RX ORDER — NOREPINEPHRINE BITARTRATE 1 MG/ML
INJECTION, SOLUTION INTRAVENOUS
Status: DISPENSED
Start: 2021-01-17 | End: 2021-01-18

## 2021-01-17 RX ORDER — PROPOFOL 10 MG/ML
5-50 INJECTION, EMULSION INTRAVENOUS
Status: DISCONTINUED | OUTPATIENT
Start: 2021-01-17 | End: 2021-01-18

## 2021-01-17 RX ORDER — KETAMINE HYDROCHLORIDE 50 MG/ML
INJECTION, SOLUTION, CONCENTRATE INTRAMUSCULAR; INTRAVENOUS
Status: COMPLETED
Start: 2021-01-17 | End: 2021-01-17

## 2021-01-17 RX ORDER — ZINC SULFATE 50(220)MG
220 CAPSULE ORAL DAILY
Status: COMPLETED | OUTPATIENT
Start: 2021-01-18 | End: 2021-01-24

## 2021-01-17 RX ORDER — ATORVASTATIN CALCIUM 40 MG/1
40 TABLET, FILM COATED ORAL
Status: DISCONTINUED | OUTPATIENT
Start: 2021-01-17 | End: 2021-01-28

## 2021-01-17 RX ORDER — CEFEPIME HYDROCHLORIDE 2 G/50ML
2000 INJECTION, SOLUTION INTRAVENOUS ONCE
Status: COMPLETED | OUTPATIENT
Start: 2021-01-17 | End: 2021-01-17

## 2021-01-17 RX ORDER — KETAMINE HYDROCHLORIDE 50 MG/ML
1.5 INJECTION, SOLUTION, CONCENTRATE INTRAMUSCULAR; INTRAVENOUS ONCE
Status: COMPLETED | OUTPATIENT
Start: 2021-01-17 | End: 2021-01-17

## 2021-01-17 RX ORDER — ROCURONIUM BROMIDE 10 MG/ML
1 INJECTION, SOLUTION INTRAVENOUS ONCE
Status: COMPLETED | OUTPATIENT
Start: 2021-01-17 | End: 2021-01-17

## 2021-01-17 RX ORDER — DEXAMETHASONE SODIUM PHOSPHATE 10 MG/ML
5 INJECTION, SOLUTION INTRAMUSCULAR; INTRAVENOUS ONCE
Status: COMPLETED | OUTPATIENT
Start: 2021-01-17 | End: 2021-01-17

## 2021-01-17 RX ORDER — MELATONIN
2000 DAILY
Status: DISCONTINUED | OUTPATIENT
Start: 2021-01-18 | End: 2021-03-04 | Stop reason: HOSPADM

## 2021-01-17 RX ORDER — PROPOFOL 10 MG/ML
INJECTION, EMULSION INTRAVENOUS
Status: DISCONTINUED
Start: 2021-01-17 | End: 2021-01-17 | Stop reason: HOSPADM

## 2021-01-17 RX ORDER — CHLORHEXIDINE GLUCONATE 0.12 MG/ML
15 RINSE ORAL EVERY 12 HOURS SCHEDULED
Status: DISCONTINUED | OUTPATIENT
Start: 2021-01-17 | End: 2021-02-02

## 2021-01-17 RX ORDER — DEXAMETHASONE SODIUM PHOSPHATE 10 MG/ML
10 INJECTION, SOLUTION INTRAMUSCULAR; INTRAVENOUS ONCE
Status: DISCONTINUED | OUTPATIENT
Start: 2021-01-17 | End: 2021-01-17

## 2021-01-17 RX ORDER — ALLOPURINOL 100 MG/1
100 TABLET ORAL DAILY
COMMUNITY

## 2021-01-17 RX ORDER — DEXAMETHASONE SODIUM PHOSPHATE 10 MG/ML
10 INJECTION, SOLUTION INTRAMUSCULAR; INTRAVENOUS ONCE
Status: COMPLETED | OUTPATIENT
Start: 2021-01-17 | End: 2021-01-17

## 2021-01-17 RX ORDER — SERTRALINE HYDROCHLORIDE 100 MG/1
100 TABLET, FILM COATED ORAL DAILY
COMMUNITY
End: 2021-11-09 | Stop reason: HOSPADM

## 2021-01-17 RX ORDER — MULTIVIT-MIN/FERROUS GLUCONATE 9 MG/15 ML
15 LIQUID (ML) ORAL DAILY
Status: DISCONTINUED | OUTPATIENT
Start: 2021-01-25 | End: 2021-02-14

## 2021-01-17 RX ORDER — CHLORHEXIDINE GLUCONATE 0.12 MG/ML
15 RINSE ORAL ONCE
Status: DISCONTINUED | OUTPATIENT
Start: 2021-01-17 | End: 2021-01-17 | Stop reason: HOSPADM

## 2021-01-17 RX ORDER — PROPOFOL 10 MG/ML
5-50 INJECTION, EMULSION INTRAVENOUS
Status: DISCONTINUED | OUTPATIENT
Start: 2021-01-17 | End: 2021-01-17 | Stop reason: HOSPADM

## 2021-01-17 RX ADMIN — ENOXAPARIN SODIUM 60 MG: 60 INJECTION SUBCUTANEOUS at 17:58

## 2021-01-17 RX ADMIN — VANCOMYCIN HYDROCHLORIDE 2000 MG: 1 INJECTION, POWDER, LYOPHILIZED, FOR SOLUTION INTRAVENOUS at 18:02

## 2021-01-17 RX ADMIN — DEXAMETHASONE SODIUM PHOSPHATE 15.6 MG: 10 INJECTION, SOLUTION INTRAMUSCULAR; INTRAVENOUS at 17:58

## 2021-01-17 RX ADMIN — ROCURONIUM BROMIDE 156 MG: 10 INJECTION, SOLUTION INTRAVENOUS at 10:51

## 2021-01-17 RX ADMIN — DEXAMETHASONE SODIUM PHOSPHATE 10 MG: 10 INJECTION, SOLUTION INTRAMUSCULAR; INTRAVENOUS at 12:13

## 2021-01-17 RX ADMIN — PROPOFOL 10 MCG/KG/MIN: 10 INJECTION, EMULSION INTRAVENOUS at 17:15

## 2021-01-17 RX ADMIN — IOHEXOL 100 ML: 350 INJECTION, SOLUTION INTRAVENOUS at 12:01

## 2021-01-17 RX ADMIN — ATORVASTATIN CALCIUM 40 MG: 40 TABLET, FILM COATED ORAL at 21:15

## 2021-01-17 RX ADMIN — CEFEPIME HYDROCHLORIDE 2000 MG: 2 INJECTION, SOLUTION INTRAVENOUS at 12:16

## 2021-01-17 RX ADMIN — KETAMINE HYDROCHLORIDE 235 MG: 50 INJECTION, SOLUTION, CONCENTRATE INTRAMUSCULAR; INTRAVENOUS at 10:50

## 2021-01-17 RX ADMIN — NOREPINEPHRINE BITARTRATE 5 MCG/MIN: 1 INJECTION, SOLUTION, CONCENTRATE INTRAVENOUS at 11:10

## 2021-01-17 RX ADMIN — KETAMINE HYDROCHLORIDE 235 MG: 50 INJECTION, SOLUTION INTRAMUSCULAR; INTRAVENOUS at 10:50

## 2021-01-17 RX ADMIN — CEFEPIME HYDROCHLORIDE 2000 MG: 2 INJECTION, POWDER, FOR SOLUTION INTRAVENOUS at 18:02

## 2021-01-17 RX ADMIN — PROPOFOL 20 MCG/KG/MIN: 10 INJECTION, EMULSION INTRAVENOUS at 21:15

## 2021-01-17 RX ADMIN — PROPOFOL 15 MCG/KG/MIN: 10 INJECTION, EMULSION INTRAVENOUS at 13:55

## 2021-01-17 RX ADMIN — OXYCODONE HYDROCHLORIDE AND ACETAMINOPHEN 1000 MG: 500 TABLET ORAL at 21:15

## 2021-01-17 RX ADMIN — SODIUM CHLORIDE 1000 ML: 0.9 INJECTION, SOLUTION INTRAVENOUS at 10:14

## 2021-01-17 RX ADMIN — CHLORHEXIDINE GLUCONATE 0.12% ORAL RINSE 15 ML: 1.2 LIQUID ORAL at 21:15

## 2021-01-17 RX ADMIN — DEXAMETHASONE SODIUM PHOSPHATE 5 MG: 10 INJECTION, SOLUTION INTRAMUSCULAR; INTRAVENOUS at 12:13

## 2021-01-17 NOTE — RESPIRATORY THERAPY NOTE
RT Ventilator Management Note  Joanna Lorenz 63 y o  male MRN: [id number]  Unit/Bed#: RM07 Encounter: [id number]      Daily Screen       1/17/2021  1106             Patient safety screen outcome[de-identified]  Failed    Not Ready for Weaning due to[de-identified]  Hemodynamically unstable;FiO2 >60%;PEEP > 8cmH2O;Poor inspiratory effort;Going on Transport intubated; Underline problem not resolved            Physical Exam:      Pt came in a respiratory distress from [address]  Pt found in ER on simple mask with SpO2 in the low 90's  Per Dr Ata Cabrera awaiting VBG results for possible intubation  After VBG results reviewed, decision made to intubate pt  Pt was intubated by Dr Ata Cabrera with Respiratory assisting, with [place] 7 5 Hi low at 25 at the lip  Positive color change with bilateral BS noted  Pt placed on the vent on AC of [id number]%  ETCO2 noted to be 45-50 on those settings  CXR pending  After pt hemodynamically stable, pt to go to CT scan  Will continue to monitor until pt transfer to another facility

## 2021-01-17 NOTE — H&P
H&P Exam - An Robert Hummel [age] y o  male MRN: [id number]  Unit/Bed#: ICU 10 Encounter: [id number]      -------------------------------------------------------------------------------------------------------------  Chief Complaint:  Respiratory failure    History of Present Illness   HX and PE limited by: Intubated, sedated  Joanna Lorenz is a 63 y o  male who presented to [address] [place] [address] Emergency Department with respiratory distress and altered mental status  Patient is a resident of [address]  As per protocol, the residents are tested on a daily basis for COVID-19 given recent outbreak  Patient received his first COVID vaccine on January 8th  His swab done yesterday was positive  Patient has been coughing for the past several weeks  Patient became progressively encephalopathic  On arrival to [facility], required intubation due to hypercapnia and hypoxia  Was intubated with ketamine and rocuronium  After approximately 1 hour of intubation, patient became restless and propofol was started  He became hypotensive, requiring norepinephrine  On arrival to [facility] he was requiring 3 micrograms/minute which has since been titrated off  Current blood pressure is 107/64  Medical records reviewed  Patient had prolonged hospitalization in January 2019 with septic and cardiogenic shock, gram positive bacteremia, respiratory failure and renal failure (required dialysis for approximately 1 year after hospitalization)  He also suffered a stroke at that time and has been in the nursing facility since  History obtained from wife and chart review  -------------------------------------------------------------------------------------------------------------  Assessment and Plan:    Neuro:    Diagnosis:  Acute encephalopathy secondary to hypercapnia and sepsis  o Plan:  Hold propofol to assess mental status    Head CT was unremarkable   Diagnosis:  History of stroke   o Plan:  Patient is bedbound at baseline    CV:    Diagnosis:  Shock, likely septic and hypovolemic  o Plan:  Hemodynamics are improved  No longer requiring norepinephrine  Goal mean arterial pressure above 65  Check BNP  Hold home antihypertensive regimen   Diagnosis:  Status post aortic valve replacement / paroxysmal atrial fibrillation  o Plan:  Currently sinus rhythm  Patient has not been on anticoagulation due to GI bleeding  Most recent echocardiogram is from July 2020  EF 50%, PA pressure 42 mm Hg    Pulm:   Diagnosis:  Acute hypoxic and hypercapnic respiratory failure  o Plan:  Initial venous blood gas on arrival showed a pH 7 113, pCO2 101 / post intubation ABG shows pH 7 343, pCO2 50, PO2 280 on assist-control with a rate of 22, tidal volume 500, FiO2 100% and PEEP of 10  Continue ventilator support, but tidal volume decreased to 450  Titrate FiO2 to maintain saturations above 90%  Initiate PAP bundle  Daily weaning assessment    GI:    Diagnosis: GERD / Hx GI Bleed  o Plan: protonix as OP - continue omeprazole    F/E/N:    Diagnosis:  Acute kidney injury  o Plan:  Hold IV fluids for now pending BNP  Follow BMP  Maintain Turner catheter    Endo:    Diagnosis:  Gout  o Plan:  Hold allopurinol in the setting of renal insufficiency  Accu-Chek with sliding scale insulin given initiation of steroids  No history of diabetes    ID:    Diagnosis:  COVID-19 pneumonia - diagnosed on 1/16/21  o Plan:  Chest CT with minimal ground-glass opacities and left lower lobe consolidation concerning for superimposed bacterial process  Initiate broad-spectrum antibiotics with cefepime and vancomycin given patient is in a nursing facility  Follow-up procalcitonin tomorrow  If negative x2, can likely deescalate/discontinue antibiotics  Follow severe COVID pathway  o Convalescent Plasma was ordered on 1/17/2021   The Fact Sheet for Patients and Parents/Caregivers was provided to the patient and/or healthcare agent  The option to accept or refuse administration was offered  The risks, benefits, and alternatives to treatment were reviewed, and all questions addressed  Disclosure that this treatment is authorized for use under EUA and not FDA approved for treatment was discussed  o Obtain inflammatory markers now  Initiate Lovenox at prophylactic dosing  However if D-dimer is elevated, transition to IV heparin infusion  Disposition: Admit to Critical Care   Code Status: Prior  --------------------------------------------------------------------------------------------------------------  Review of Systems    Review of systems was unable to be performed secondary to Intubated, sedated    Physical Exam  Constitutional:       General: He is not in acute distress  Appearance: He is obese  He is not diaphoretic  HENT:      Head: Normocephalic and atraumatic  Mouth/Throat:      Mouth: Mucous membranes are moist       Pharynx: No oropharyngeal exudate  Eyes:      General: No scleral icterus  Comments: Pinpoint pupils   Cardiovascular:      Rate and Rhythm: Normal rate and regular rhythm  Heart sounds: No murmur  Pulmonary:      Breath sounds: No wheezing, rhonchi or rales  Abdominal:      General: There is no distension  Palpations: Abdomen is soft  Genitourinary:     Comments: Turner catheter in place  Musculoskeletal:         General: Swelling present  Skin:     General: Skin is warm  Findings: No rash        --------------------------------------------------------------------------------------------------------------  Vitals:   Vitals:    01/17/21 1624   SpO2: 96%     Temp  Min: 97 5 °F (36 4 °C)  Max: 100 4 °F (38 °C)       153 kg, height 5 ft 9 in, BMI 50 8  There is no height or weight on file to calculate BMI    N/A    Laboratory and Diagnostics:  Results from last 7 days   Lab Units 01/17/21  1003   WBC Thousand/uL 7 33   HEMOGLOBIN g/dL 12 5   HEMATOCRIT % 45 4 PLATELETS Thousands/uL 181   NEUTROS PCT % 80*   MONOS PCT % 13*     Results from last 7 days   Lab Units 01/17/21  1003   SODIUM mmol/L 143   POTASSIUM mmol/L 4 9   CHLORIDE mmol/L 104   CO2 mmol/L 33*   ANION GAP mmol/L 6   BUN mg/dL 24   CREATININE mg/dL 1 34*   CALCIUM mg/dL 8 3   GLUCOSE RANDOM mg/dL 114   ALT U/L 26   AST U/L 20   ALK PHOS U/L 101   ALBUMIN g/dL 3 3*   TOTAL BILIRUBIN mg/dL 0 20     Results from last 7 days   Lab Units 01/17/21  1003   MAGNESIUM mg/dL 2 4      Results from last 7 days   Lab Units 01/17/21  1003   INR  1 04   PTT seconds 33      Results from last 7 days   Lab Units 01/17/21  1003   TROPONIN I ng/mL 0 02     Results from last 7 days   Lab Units 01/17/21  1003   LACTIC ACID mmol/L 0 4*     ABG:  Results from last 7 days   Lab Units 01/17/21  1137   PH ART  7 343*   PCO2 ART mm Hg 50 5*   PO2 ART mm Hg 280 0*   HCO3 ART mmol/L 26 8   BASE EXC ART mmol/L 0 5   ABG SOURCE  Radial, Left     VBG:  Results from last 7 days   Lab Units 01/17/21  1137 01/17/21  1003   PH IVONNE   --  7 113*   PCO2 IVONNE mm Hg  --  101 1*   PO2 IVONNE mm Hg  --  103 9*   HCO3 IVONNE mmol/L  --  31 6*   BASE EXC IVONNE mmol/L  --  -0 7   ABG SOURCE  Radial, Left  --      Results from last 7 days   Lab Units 01/17/21  1003   PROCALCITONIN ng/ml 0 17       Micro:  Results from last 7 days   Lab Units 01/17/21  1003   BLOOD CULTURE  Received in Microbiology Lab  Culture in Progress  Received in Microbiology Lab  Culture in Progress  Imaging: I have personally reviewed pertinent reports  and I have personally reviewed pertinent films in PACS CT of the chest abdomen pelvis performed today shows perihilar ground-glass opacity, cardiomegaly, mucus plugging in the right lower lobe, no pulmonary embolism    Abdomen unremarkable    Historical Information   Past Medical History:   Diagnosis Date    Allergic rhinitis     last assessed 9/12/12    Finger fracture, right     Closed fx of the middle phalanx of the right 5th finger  last assessed 1/30/14    Hemorrhoids     last assessed 2/10/14    Hyperlipidemia     Hypertension     Stroke [facility])      Past Surgical History:   Procedure Laterality Date    AORTIC VALVE REPLACEMENT  07/30/2014    AVR with 25mm [facility] Ease bovine pericardial valve    CARDIAC CATHETERIZATION  07/18/2014    SLB left main-normal, circumflex-normal, ramus intermedius-normal, RCA was large and dominant giving rise to the PDA & a large posterolateral branch  No disease  last assessed 8/19/14     COLONOSCOPY N/A 2/25/2019    Procedure: COLONOSCOPY;  Surgeon: Jennifer Jamil DO;  [facility]: [facility]; Service: Gastroenterology    ESOPHAGOGASTRODUODENOSCOPY N/A 2/22/2019    Procedure: ESOPHAGOGASTRODUODENOSCOPY (EGD)-roadshow overnight;  Surgeon: Jennifer Jamil DO;  [facility]; Service: Gastroenterology    ESOPHAGOGASTRODUODENOSCOPY N/A 2/23/2019    Procedure: ESOPHAGOGASTRODUODENOSCOPY (EGD); Surgeon: Dhiraj Alvarez MD;  [facility]; Service: Gastroenterology    ESOPHAGOGASTRODUODENOSCOPY N/A 2/25/2019    Procedure: ESOPHAGOGASTRODUODENOSCOPY (EGD); Surgeon: Jennifer Jamil DO;  [facility];   Service: Gastroenterology    IR NON-TUNNELED CENTRAL LINE PLACEMENT  3/1/2019    IR NON-TUNNELED CENTRAL LINE PLACEMENT  2/4/2019    IR TUNNELED DIALYSIS CATHETER CHECK/CHANGE/REPOSITION/ANGIOPLASTY  4/18/2019    IR TUNNELED DIALYSIS CATHETER PLACEMENT  2/4/2019    IR TUNNELED DIALYSIS CATHETER PLACEMENT  2/18/2019    KNEE ARTHROSCOPY      KNEE CARTILAGE SURGERY Left     medial meniscus tear     TESTICLE SURGERY      TONSILLECTOMY AND ADENOIDECTOMY      TOOTH EXTRACTION       Social History   Social History     Substance and Sexual Activity   Alcohol Use Never    Frequency: Never    Comment: ocassion /never drank alcohol per [employer]      Social History     Substance and Sexual Activity   Drug Use No     Social History     Tobacco Use   Smoking Status Never Smoker   Smokeless Tobacco Never Used     Family History:   Family History   Problem Relation Age of Onset    Lung cancer Mother     Heart disease Mother         pacemaker placement     Coronary artery disease Father     Hypertension Father     Heart attack Father     Cancer Family         bladder      I have reviewed this patient's family history and commented on sigificant items within the HPI    Medications:  Current Facility-Administered Medications   Medication Dose Route Frequency    norepinephrine (LEVOPHED) 1 mg/mL injection **ADS Override Pull**         Home medications:  Prior to Admission Medications   Prescriptions Last Dose Informant Patient Reported? Taking?    Cholecalciferol (VITAMIN D3) 14494 units TABS  Outside Facility (Specify) Yes No   Sig: Take by mouth   POTASSIUM CHLORIDE PO  Self Yes No   Sig: Take 40 mEq by mouth 2 (two) times a day   Probiotic Product (BACID PO)   Yes No   Sig: Take by mouth   acetaminophen (TYLENOL) 325 mg tablet   No No   Sig: Take 2 tablets (650 mg total) by mouth every 6 (six) hours as needed for mild pain   allopurinol (ZYLOPRIM) 100 mg tablet   Yes No   Sig: Take 100 mg by mouth daily   aspirin (ECOTRIN LOW STRENGTH) 81 mg EC tablet   No No   Sig: Take 1 tablet (81 mg total) by mouth daily   atorvastatin (LIPITOR) 40 mg tablet   No No   Sig: Take 1 tablet (40 mg total) by mouth daily with dinner   b complex-vitamin C-folic acid (NEPHROCAPS) 1 mg capsule   No No   Sig: Take 1 capsule by mouth daily with dinner   bisacodyl (DULCOLAX) 10 mg suppository   No No   Sig: Insert 1 suppository (10 mg total) into the rectum daily as needed for constipation   Patient not taking: Reported on 1/17/2021   bumetanide (BUMEX) 1 mg tablet   Yes No   Sig: Take 2 mg by mouth daily    diltiazem (CARDIZEM CD) 180 mg 24 hr capsule   No No   Sig: Take 1 capsule (180 mg total) by mouth daily   enoxaparin (LOVENOX) 40 mg/0 4 mL   Yes No   Sig: Inject 40 mg under the skin daily hydrocortisone 1 % cream   No No   Sig: Apply topically 2 (two) times a day as needed for rash   Patient not taking: Reported on 1/17/2021   magnesium oxide (MAG-OX) 400 mg   No No   Sig: Take 1 tablet (400 mg total) by mouth daily   melatonin 3 mg  Outside Facility (Specify) No No   Sig: Take 2 tablets (6 mg total) by mouth daily at bedtime   Patient not taking: Reported on 1/17/2021   metoprolol tartrate (LOPRESSOR) 50 mg tablet   No No   Sig: Take 1 tablet (50 mg total) by mouth 3 (three) times a day   midodrine (PROAMATINE) 10 MG tablet   No No   Sig: Take 1 tablet (10 mg total) by mouth Before Dialysis (Hyponatremia associated with hemodialysis)   Patient not taking: Reported on 3/11/2020   nystatin (MYCOSTATIN) powder   No No   Sig: Apply topically 2 (two) times a day   Patient not taking: Reported on 1/17/2021   pantoprazole (PROTONIX) 40 mg tablet   No No   Sig: Take 1 tablet (40 mg total) by mouth 2 (two) times a day before meals   Patient taking differently: Take 40 mg by mouth daily    polyvinyl alcohol (LIQUIFILM TEARS) 1 4 % ophthalmic solution   No No   Sig: Administer 1 drop to both eyes every 3 (three) hours as needed for dry eyes   Patient not taking: Reported on 1/17/2021   sertraline (ZOLOFT) 100 mg tablet   Yes No   Sig: Take 100 mg by mouth daily   sertraline (ZOLOFT) 25 mg tablet   No No   Sig: Take 1 tablet (25 mg total) by mouth daily after dinner   Patient taking differently: Take 50 mg by mouth daily after dinner 75mg daily   traMADol (ULTRAM) 50 mg tablet  Outside Facility (Specify) Yes No   Sig: Take 50 mg by mouth every 6 (six) hours as needed for moderate pain      Facility-Administered Medications: None     Allergies:   Allergies   Allergen Reactions    Amiodarone      Developed Rash    Penicillins Rash     However, has subsequently tolerated Cefazolin and Cefepime ------------------------------------------------------------------------------------------------------------  Advance Directive and Living Will:      Power of :    POLST:    ------------------------------------------------------------------------------------------------------------  Anticipated Length of Stay is > 2 midnights    Care Time Delivered:   Upon my evaluation, this patient had a high probability of imminent or life-threatening deterioration due to Respiratory failure and shock, which required my direct attention, intervention, and personal management  I have personally provided 45 minutes (4:09 p m  to 4:54 p m ) of critical care time, exclusive of procedures, teaching, family meetings, and any prior time recorded by providers other than myself  Geovani Sharp DO      Portions of the record may have been created with voice recognition software  Occasional wrong word or "sound a like" substitutions may have occurred due to the inherent limitations of voice recognition software    Read the chart carefully and recognize, using context, where substitutions have occurred

## 2021-01-17 NOTE — ED NOTES
Ketamine administered by Tara Gilbert per Dr Chon Duckworth at this time     Sima Scales, RN  01/17/21 6652

## 2021-01-17 NOTE — ED NOTES
Patient transported to [address] [place] [address], [facility]0 [facility]  01/17/21 1144

## 2021-01-17 NOTE — EMTALA/ACUTE CARE TRANSFER
[address] [place] EMERGENCY DEPARTMENT  [address] [place] [id number]  Dept: [phone]      EMTALA TRANSFER CONSENT    NAME Danny Patel                                         1960                              MRN [id number]    I have been informed of my rights regarding examination, treatment, and transfer   by Dr Ilda Henry DO    Benefits: Specialized equipment and/or services available at the receiving facility (Include comment)________________________(Critical care/pulmonology)    Risks: Potential for delay in receiving treatment, Possible worsening of condition or death during transfer, Other: (Include comment)__________________________, Potential deterioration of medical condition, Loss of IV, Increased discomfort during transfer(Helicopter crash)      Consent for Transfer:  I acknowledge that my medical condition has been evaluated and explained to me by the emergency department physician or other qualified medical person and/or my attending physician, who has recommended that I be transferred to the service of  Accepting Physician: Dr Yogi Gloria at [address], [address] : [facility]; [facility]  The above potential benefits of such transfer, the potential risks associated with such transfer, and the probable risks of not being transferred have been explained to me, and I fully understand them  The doctor has explained that, in my case, the benefits of transfer outweigh the risks  I agree to be transferred  I authorize the performance of emergency medical procedures and treatments upon me in both transit and upon arrival at the receiving facility  Additionally, I authorize the release of any and all medical records to the receiving facility and request they be transported with me, if possible    I understand that the safest mode of transportation during a medical emergency is an ambulance and that the Hospital advocates the use of this mode of transport  Risks of traveling to the receiving facility by car, including absence of medical control, life sustaining equipment, such as oxygen, and medical personnel has been explained to me and I fully understand them  (LAURA CORRECT BOX BELOW)  [  ]  I consent to the stated transfer and to be transported by ambulance/helicopter  [  ]  I consent to the stated transfer, but refuse transportation by ambulance and accept full responsibility for my transportation by car  I understand the risks of non-ambulance transfers and I exonerate the Hospital and its staff from any deterioration in my condition that results from this refusal     X___________________________________________    DATE  21  TIME________  Signature of patient or legally responsible individual signing on patient behalf           RELATIONSHIP TO PATIENT_________________________          Provider Certification    NAME Timmy Mon                                        [facility] 1960                              MRN [id number]    A medical screening exam was performed on the above named patient  Based on the examination:    Condition Necessitating Transfer The primary encounter diagnosis was Acute hypoxemic respiratory failure due to COVID-19 [facility])  Diagnoses of Altered mental status and Hypercapnia were also pertinent to this visit      Patient Condition: The patient has been stabilized such that within reasonable medical probability, no material deterioration of the patient condition or the condition of the unborn child(laxmi) is likely to result from the transfer    Reason for Transfer: Level of Care needed not available at this facility(Critical care)    Transfer Requirements: [address]; [facility]   · Space available and qualified personnel available for treatment as acknowledged by    · Agreed to accept transfer and to provide appropriate medical treatment as acknowledged by        Natacha  · Appropriate medical records of the examination and treatment of the patient are provided at the time of transfer   [facility], [address] _______  · Transfer will be performed by qualified personnel from    and appropriate transfer equipment as required, including the use of necessary and appropriate life support measures  Provider Certification: I have examined the patient and explained the following risks and benefits of being transferred/refusing transfer to the patient/family:  General risk, such as traffic hazards, adverse weather conditions, rough terrain or turbulence, possible failure of equipment (including vehicle or aircraft), or consequences of actions of persons outside the control of the transport personnel      Based on these reasonable risks and benefits to the patient and/or the unborn child(laxmi), and based upon the information available at the time of the patients examination, I certify that the medical benefits reasonably to be expected from the provision of appropriate medical treatments at another medical facility outweigh the increasing risks, if any, to the individuals medical condition, and in the case of labor to the unborn child, from effecting the transfer      X____________________________________________ DATE 01/17/21        TIME_______      ORIGINAL - SEND TO MEDICAL RECORDS   COPY - SEND WITH PATIENT DURING TRANSFER

## 2021-01-17 NOTE — ED PROVIDER NOTES
History  Chief Complaint   Patient presents with    Respiratory Distress     with fever,      69-year-old male presents to the emergency department from [address] [place] [address] with respiratory distress/altered mental status and fever  Patient confirmed COVID positive although date of positive result was not known--it is within the past week  He has had increasing respiratory distress at least over the past 24 hr; nursing home reported room air saturations in the mid 80% on 3 liters/minute by face mask  When EMS increased his rate to 8 liters/minute, the patient had improved oxygenation to the mid 90%  He presented with no specific complaint and denies any pain or discomfort but was quite somnolent upon arrival  Per EMS report, the patient was minimally responsive at their initial contact with did become somewhat more awake when his oxygen supply was increased  Entirety of history taken from EMS and medical record secondary to patient's altered mental status  A/P: AMS in setting of hypoxemia and COVID disease  He is maintaining adequate oxygen saturations currently despite his poor respiratory effort, I am concerned that he is hypercapnic and will require intubation to control his carbon dioxide levels  Check broad sepsis workup  CT head given altered mental status  CT chest/abdomen/pelvis for any other source of infection  History provided by:  Medical records, EMS personnel, patient and spouse      Prior to Admission Medications   Prescriptions Last Dose Informant Patient Reported? Taking?    Cholecalciferol (VITAMIN D3) 81225 units TABS Not Taking at Unknown time Outside Facility (Specify) Yes No   Sig: Take by mouth   POTASSIUM CHLORIDE PO  Self Yes Yes   Sig: Take 40 mEq by mouth 2 (two) times a day   Probiotic Product (BACID PO) Not Taking at Unknown time  Yes No   Sig: Take by mouth   acetaminophen (TYLENOL) 325 mg tablet   No Yes   Sig: Take 2 tablets (650 mg total) by mouth every 6 (six) hours as needed for mild pain   allopurinol (ZYLOPRIM) 100 mg tablet   Yes Yes   Sig: Take 100 mg by mouth daily   aspirin (ECOTRIN LOW STRENGTH) 81 mg EC tablet   No Yes   Sig: Take 1 tablet (81 mg total) by mouth daily   atorvastatin (LIPITOR) 40 mg tablet   No Yes   Sig: Take 1 tablet (40 mg total) by mouth daily with dinner   b complex-vitamin C-folic acid (NEPHROCAPS) 1 mg capsule   No Yes   Sig: Take 1 capsule by mouth daily with dinner   bisacodyl (DULCOLAX) 10 mg suppository Not Taking at Unknown time  No No   Sig: Insert 1 suppository (10 mg total) into the rectum daily as needed for constipation   Patient not taking: Reported on 1/17/2021   bumetanide (BUMEX) 1 mg tablet   Yes Yes   Sig: Take 2 mg by mouth daily    diltiazem (CARDIZEM CD) 180 mg 24 hr capsule   No Yes   Sig: Take 1 capsule (180 mg total) by mouth daily   enoxaparin (LOVENOX) 40 mg/0 4 mL   Yes Yes   Sig: Inject 40 mg under the skin daily   hydrocortisone 1 % cream Not Taking at Unknown time  No No   Sig: Apply topically 2 (two) times a day as needed for rash   Patient not taking: Reported on 1/17/2021   magnesium oxide (MAG-OX) 400 mg   No Yes   Sig: Take 1 tablet (400 mg total) by mouth daily   melatonin 3 mg Not Taking at Unknown time Outside Facility (Specify) No No   Sig: Take 2 tablets (6 mg total) by mouth daily at bedtime   Patient not taking: Reported on 1/17/2021   metoprolol tartrate (LOPRESSOR) 50 mg tablet   No Yes   Sig: Take 1 tablet (50 mg total) by mouth 3 (three) times a day   midodrine (PROAMATINE) 10 MG tablet Not Taking at Unknown time  No No   Sig: Take 1 tablet (10 mg total) by mouth Before Dialysis (Hyponatremia associated with hemodialysis)   Patient not taking: Reported on 3/11/2020   nystatin (MYCOSTATIN) powder Not Taking at Unknown time  No No   Sig: Apply topically 2 (two) times a day   Patient not taking: Reported on 1/17/2021   pantoprazole (PROTONIX) 40 mg tablet   No Yes   Sig: Take 1 tablet (40 mg total) by mouth 2 (two) times a day before meals   Patient taking differently: Take 40 mg by mouth daily    polyvinyl alcohol (LIQUIFILM TEARS) 1 4 % ophthalmic solution Not Taking at Unknown time  No No   Sig: Administer 1 drop to both eyes every 3 (three) hours as needed for dry eyes   Patient not taking: Reported on 1/17/2021   sertraline (ZOLOFT) 100 mg tablet   Yes Yes   Sig: Take 100 mg by mouth daily   sertraline (ZOLOFT) 25 mg tablet   No Yes   Sig: Take 1 tablet (25 mg total) by mouth daily after dinner   Patient taking differently: Take 50 mg by mouth daily after dinner 75mg daily   traMADol (ULTRAM) 50 mg tablet Not Taking at Unknown time Outside Facility (Specify) Yes No   Sig: Take 50 mg by mouth every 6 (six) hours as needed for moderate pain      Facility-Administered Medications: None       Past Medical History:   Diagnosis Date    Allergic rhinitis     last assessed 9/12/12    Finger fracture, right     Closed fx of the middle phalanx of the right 5th finger  last assessed 1/30/14    Hemorrhoids     last assessed 2/10/14    Hyperlipidemia     Hypertension     Stroke [facility])        Past Surgical History:   Procedure Laterality Date    AORTIC VALVE REPLACEMENT  07/30/2014    AVR with 25mm [facility] Ease bovine pericardial valve    CARDIAC CATHETERIZATION  07/18/2014    SLB left main-normal, circumflex-normal, ramus intermedius-normal, RCA was large and dominant giving rise to the PDA & a large posterolateral branch  No disease  last assessed 8/19/14     COLONOSCOPY N/A 2/25/2019    Procedure: COLONOSCOPY;  Surgeon: Brendan Aguilar DO;  [facility]: [facility]; Service: Gastroenterology    ESOPHAGOGASTRODUODENOSCOPY N/A 2/22/2019    Procedure: ESOPHAGOGASTRODUODENOSCOPY (EGD)-roadshow overnight;  Surgeon: Brendan Aguilar DO;  [facility]; Service: Gastroenterology    ESOPHAGOGASTRODUODENOSCOPY N/A 2/23/2019    Procedure: ESOPHAGOGASTRODUODENOSCOPY (EGD);   Surgeon: Abhinav Bright MD; [facility]; Service: Gastroenterology    ESOPHAGOGASTRODUODENOSCOPY N/A 2/25/2019    Procedure: ESOPHAGOGASTRODUODENOSCOPY (EGD); Surgeon: Brendan Aguilar DO;  [facility]; Service: Gastroenterology    IR NON-TUNNELED CENTRAL LINE PLACEMENT  3/1/2019    IR NON-TUNNELED CENTRAL LINE PLACEMENT  2/4/2019    IR TUNNELED DIALYSIS CATHETER CHECK/CHANGE/REPOSITION/ANGIOPLASTY  4/18/2019    IR TUNNELED DIALYSIS CATHETER PLACEMENT  2/4/2019    IR TUNNELED DIALYSIS CATHETER PLACEMENT  2/18/2019    KNEE ARTHROSCOPY      KNEE CARTILAGE SURGERY Left     medial meniscus tear     TESTICLE SURGERY      TONSILLECTOMY AND ADENOIDECTOMY      TOOTH EXTRACTION         Family History   Problem Relation Age of Onset    Lung cancer Mother     Heart disease Mother         pacemaker placement     Coronary artery disease Father     Hypertension Father     Heart attack Father     Cancer Family         bladder      I have reviewed and agree with the history as documented  E-Cigarette/Vaping    E-Cigarette Use Never User      E-Cigarette/Vaping Substances    Nicotine No     THC No     CBD No     Flavoring No     Other No     Unknown No      Social History     Tobacco Use    Smoking status: Never Smoker    Smokeless tobacco: Never Used   Substance Use Topics    Alcohol use: Never     Frequency: Never     Comment: ocassion /never drank alcohol per [employer]     Drug use: No       Review of Systems   Unable to perform ROS: Mental status change       Physical Exam  Physical Exam  Vitals signs and nursing note reviewed  Constitutional:       General: He is in acute distress (somnolent; minimally responsive)  Appearance: He is obese  Interventions: Face mask in place  HENT:      Head: Normocephalic and atraumatic        Right Ear: Hearing and external ear normal       Left Ear: Hearing and external ear normal    Eyes:      Comments: Pupils 3 mm equal/round/reactive   Neck:      Trachea: Trachea and phonation normal    Cardiovascular:      Rate and Rhythm: Normal rate and regular rhythm  Pulses:           Radial pulses are 2+ on the right side and 2+ on the left side  Dorsalis pedis pulses are 2+ on the right side and 2+ on the left side  Posterior tibial pulses are 2+ on the right side and 2+ on the left side  Heart sounds: Normal heart sounds, S1 normal and S2 normal  No murmur  No friction rub  No gallop  Pulmonary:      Effort: Tachypnea, accessory muscle usage and respiratory distress present  Breath sounds: No stridor  Wheezing and rhonchi present  No decreased breath sounds or rales  Chest:      Chest wall: No tenderness  Abdominal:      General: There is no distension  Palpations: Abdomen is soft  Abdomen is not rigid  There is no mass  Tenderness: There is no abdominal tenderness  There is no guarding or rebound  Comments: Obese; nondistended   Musculoskeletal:         General: No tenderness or deformity  Skin:     General: Skin is warm and dry  Neurological:      Mental Status: He is lethargic  GCS: GCS eye subscore is 3  GCS verbal subscore is 3  GCS motor subscore is 5  Comments: Does not spontaneously move extremities    Requires repeated prompting to open eyes or follow commands  Answers in 1-2 word replies         Vital Signs  ED Triage Vitals [01/17/21 0949]   Temperature Pulse Respirations Blood Pressure SpO2   98 1 °F (36 7 °C) 74 (!) 30 131/78 98 %      Temp Source Heart Rate Source Patient Position - Orthostatic VS BP Location FiO2 (%)   Temporal Monitor Lying Left arm --      Pain Score       --           Vitals:    01/17/21 1345 01/17/21 1400 01/17/21 1415 01/17/21 1445   BP: 111/66 114/65 94/56 100/63   Pulse: 73 72 76 77   Patient Position - Orthostatic VS: Lying Lying Lying Lying         Visual Acuity  Visual Acuity      Most Recent Value   L Pupil Size (mm)  3   R Pupil Size (mm)  3          ED Medications  Medications   chlorhexidine (PERIDEX) 0 12 % oral rinse 15 mL (0 mL Swish & Spit Hold 1/17/21 1113)   norepinephrine (LEVOPHED) 4 mg (STANDARD CONCENTRATION) IV in sodium chloride 0 9% 250 mL (3 mcg/min Intravenous Rate/Dose Change 1/17/21 1300)   propofol (DIPRIVAN) 1000 mg in 100 mL infusion (premix) (20 mcg/kg/min × 156 kg Intravenous Rate/Dose Change 1/17/21 1407)   sodium chloride 0 9 % bolus 1,000 mL (0 mL Intravenous Stopped 1/17/21 1214)   ketamine (KETALAR) 235 mg (235 mg Intravenous Given 1/17/21 1050)   ROCuronium (ZEMURON) injection 156 mg (156 mg Intravenous Given 1/17/21 1051)   cefepime (MAXIPIME) IVPB (premix in dextrose) 2,000 mg 50 mL (0 mg Intravenous Stopped 1/17/21 1246)   dexamethasone (PF) (DECADRON) injection 10 mg (10 mg Intravenous Given 1/17/21 1213)   dexamethasone (PF) (DECADRON) injection 5 mg (5 mg Intravenous Given 1/17/21 1213)   iohexol (OMNIPAQUE) 350 MG/ML injection (SINGLE-DOSE) 100 mL (100 mL Intravenous Given 1/17/21 1201)       Diagnostic Studies  Results Reviewed     Procedure Component Value Units Date/Time    COVID19, Influenza A/B, RSV PCR, [facility] [[id number]]  (Abnormal) Collected: 01/17/21 1404    Lab Status: Final result Specimen: Nares from Nasopharyngeal Swab Updated: 01/17/21 1500     SARS-CoV-2 Positive     INFLUENZA A PCR Negative     INFLUENZA B PCR Negative     RSV PCR Negative    Narrative: This test has been authorized by FDA under an EUA (Emergency Use Assay) for use by authorized laboratories  Clinical caution and judgement should be used with the interpretation of these results with consideration of the clinical impression and other laboratory testing  Testing reported as "Positive" or "Negative" has been proven to be accurate according to standard laboratory validation requirements  All testing is performed with control materials showing appropriate reactivity at standard intervals      Blood gas, arterial [[id number]]  (Abnormal) Collected: 01/17/21 1137    Lab Status: Final result Specimen: Blood, Arterial from Radial, Left Updated: 01/17/21 1142     pH, Arterial 7 343     pCO2, Arterial 50 5 mm Hg      pO2, Arterial 280 0 mm Hg      HCO3, Arterial 26 8 mmol/L      Base Excess, Arterial 0 5 mmol/L      O2 Content, Arterial 17 5 mL/dL      O2 HGB,Arterial  98 2 %      SOURCE Radial, Left     MARLON TEST Yes     Vent Type- AC AC     AC Rate 22     Tidal Volume 500 ml      Inspired Air (FIO2) 100     PEEP 10    Lactic acid [[id number]]  (Abnormal) Collected: 01/17/21 1003    Lab Status: Final result Specimen: Blood from Arm, Left Updated: 01/17/21 1043     LACTIC ACID 0 4 mmol/L     Narrative:      Result may be elevated if tourniquet was used during collection      Troponin I [[id number]]  (Normal) Collected: 01/17/21 1003    Lab Status: Final result Specimen: Blood from Arm, Left Updated: 01/17/21 1041     Troponin I 0 02 ng/mL     Magnesium [[id number]]  (Normal) Collected: 01/17/21 1003    Lab Status: Final result Specimen: Blood from Arm, Left Updated: 01/17/21 1032     Magnesium 2 4 mg/dL     Comprehensive metabolic panel [[id number]]  (Abnormal) Collected: 01/17/21 1003    Lab Status: Final result Specimen: Blood from Arm, Left Updated: 01/17/21 1030     Sodium 143 mmol/L      Potassium 4 9 mmol/L      Chloride 104 mmol/L      CO2 33 mmol/L      ANION GAP 6 mmol/L      BUN 24 mg/dL      Creatinine 1 34 mg/dL      Glucose 114 mg/dL      Calcium 8 3 mg/dL      Corrected Calcium 8 9 mg/dL      AST 20 U/L      ALT 26 U/L      Alkaline Phosphatase 101 U/L      Total Protein 7 7 g/dL      Albumin 3 3 g/dL      Total Bilirubin 0 20 mg/dL      eGFR 57 ml/min/1 73sq m     Narrative:      [place] guidelines for Chronic Kidney Disease (CKD):     Stage 1 with normal or high GFR (GFR > 90 mL/min/1 73 square meters)    Stage 2 Mild CKD (GFR = 60-89 mL/min/1 73 square meters)    Stage 3A Moderate CKD (GFR = 45-59 mL/min/1 73 square meters)    Stage 3B Moderate CKD (GFR = 30-44 mL/min/1 73 square meters)    Stage 4 Severe CKD (GFR = 15-29 mL/min/1 73 square meters)    Stage 5 End Stage CKD (GFR <15 mL/min/1 73 square meters)  Note: GFR calculation is accurate only with a steady state creatinine    APTT [[id number]]  (Normal) Collected: 01/17/21 1003    Lab Status: Final result Specimen: Blood from Arm, Left Updated: 01/17/21 1025     PTT 33 seconds     Protime-INR [[id number]]  (Normal) Collected: 01/17/21 1003    Lab Status: Final result Specimen: Blood from Arm, Left Updated: 01/17/21 1025     Protime 13 4 seconds      INR 1 04    Blood gas, venous [[id number]]  (Abnormal) Collected: 01/17/21 1003    Lab Status: Final result Specimen: Blood from Arm, Left Updated: 01/17/21 1019     pH, Landon 7 113     pCO2, [place]andon 101 1 mm Hg      pO2, Landon 103 9 mm Hg      HCO3, L[place] 31 6 mmol/L      Base Excess, Landon -0 7 mmol/L      O2 Content, [place] 17 8 ml/dL      O2 HGB, VENOUS 94 5 %     CBC and differential [[id number]]  (Abnormal) Collected: 01/17/21 1003    Lab Status: Final result Specimen: Blood from Arm, Left Updated: 01/17/21 1016     WBC 7 33 Thousand/uL      RBC 5 35 Million/uL      Hemoglobin 12 5 g/dL      Hematocrit 45 4 %      MCV 85 fL      MCH 23 4 pg      MCHC 27 5 g/dL      RDW 17 5 %      MPV 11 0 fL      Platelets 350 Thousands/uL      nRBC 0 /100 WBCs      Neutrophils Relative 80 %      Immat GRANS % 1 %      Lymphocytes Relative 6 %      Monocytes Relative 13 %      Eosinophils Relative 0 %      Basophils Relative 0 %      Neutrophils Absolute 5 84 Thousands/µL      Immature Grans Absolute 0 04 Thousand/uL      Lymphocytes Absolute 0 47 Thousands/µL      Monocytes Absolute 0 95 Thousand/µL      Eosinophils Absolute 0 00 Thousand/µL      Basophils Absolute 0 03 Thousands/µL     Procalcitonin with AM Reflex [[id number]] Collected: 01/17/21 1003    Lab Status:  In process Specimen: Blood from Arm, Left Updated: 01/17/21 1012    Blood culture #2 [[id number]] Collected: 01/17/21 1003    Lab Status: In process Specimen: Blood from Arm, Left Updated: 01/17/21 1012    Blood culture #1 [[id number]] Collected: 01/17/21 1003    Lab Status: In process Specimen: Blood from Arm, Right Updated: 01/17/21 1012                 PE Study with CT abdomen & pelvis with contrast   Final Result by Josefina Cox MD (01/17 1254)      Perihilar groundglass is favored to be on the basis of pulmonary edema rather than infection  Cardiomegaly is present  Right lower lobe endobronchial mucous plugging with associated atelectasis  Tip of the feeding tube is terminating in stomach with the sidehole located at the level of the gastroesophageal junction  Recommend advancement  No evidence for bowel obstruction  The study was marked in [facility] for immediate notification  Workstation performed: [id number]         CT head without contrast   Final Result by Nancy Anderson MD (01/17 8675)      No acute intracranial abnormality  Stable areas of encephalomalacia as described  Workstation performed: [id number]         XR chest 1 view portable   ED Interpretation by Hina Medel DO (01/17 5222)   Endotracheal tube at  appropriate depth in mid-trachea  Possible developing right lower lobe consolidation; this is limited secondary to patient's body habitus  Enlarged cardiac silhouette similar to prior  OG tube extends in the stomach  Prior sternotomy        XR chest 1 view portable    (Results Pending)   XR chest 1 view portable    (Results Pending)              Procedures  ECG 12 Lead Documentation Only    Date/Time: 1/17/2021 10:07 AM  Performed by: Hina Medel DO  Authorized by: Hina Medel DO     Indications / Diagnosis:  AMS  Patient location:  ED  Interpretation:     Interpretation: abnormal    Rate:     ECG rate:  71    ECG rate assessment: normal    Rhythm:     Rhythm: sinus rhythm    Ectopy:     Ectopy: none QRS:     QRS axis:  Right    QRS intervals: Wide  Conduction:     Conduction: abnormal      Abnormal conduction: complete LBBB    ST segments:     ST segments:  Normal  T waves:     T waves: normal    Comments:      Pr 214 qrs 150 qtc 428  Central Line    Date/Time: 1/17/2021 12:35 PM  Performed by: Alvin Piña DO  Authorized by: Alvin Piña DO     Patient location:  ED  Consent:     Consent obtained:  Emergent situation  Universal protocol:     Patient identity confirmed:  Arm band  Pre-procedure details:     Hand hygiene: Hand hygiene performed prior to insertion      Sterile barrier technique: All elements of maximal sterile technique followed      Skin preparation:  2% chlorhexidine    Skin preparation agent: Skin preparation agent completely dried prior to procedure    Indications:     Central line indications: medications requiring central line and no peripheral vascular access    Anesthesia (see MAR for exact dosages): Anesthesia method:  Local infiltration    Local anesthetic:  Lidocaine 1% w/o epi  Procedure details:     Location: Initial attempt at L IJ access that was unsuccessful d/t inability to advance the guidewire; this attempt was terminated and L femoral CVC was placed in 1 attempt  Vessel type: vein      Laterality:  Left    Patient position:  Flat    Catheter type:  Triple lumen 20cm    Catheter size:  7 Fr    Landmarks identified: yes      Ultrasound guidance: yes      Sterile ultrasound techniques: Sterile gel and sterile probe covers were used      Manometry confirmation: yes      Number of attempts:  2    Successful placement: yes      Vessel of catheter tip end:  Flush to skin  Post-procedure details:     Post-procedure:  Dressing applied and line sutured    Assessment:  Blood return through all ports and free fluid flow    Patient tolerance of procedure: Tolerated well, no immediate complications  Comments:       While there had been a previously noted left IJ clot in the patient's record, ultrasound evaluation of the left IJ prior to the procedure demonstrated an easily compressible IJ throughout its entire length  After appropriate sterile preparation and under ultrasound guidance, the left IJ was cannulated easily with good blood return  Guidewire, however, was unable to be advanced at all  I then terminated this attempt and switched to the L femoral site which was the only other reasonably accessible site given patient's body habitus and positioning  This attempt was completed easily using standard technique  Intubation    Date/Time: 1/17/2021 11:06 AM  Performed by: Rubi Jerome DO  Authorized by: Rubi Jerome DO     Patient location:  ED  Consent:     Consent obtained:  Emergent situation  Universal protocol:     Patient identity confirmed:  Arm band  Pre-procedure details:     Patient status:  Altered mental status    Pretreatment meds: Ketamine 1 5 mg/kg  Paralytics:  Rocuronium (1 mg/kg)  Indications:     Indications for intubation: airway protection and hypercapnia    Procedure details:     Preoxygenation: BVM+nasal cannula; required ventilation during the apenic period  CPR in progress: no      Intubation method:  Oral    Oral intubation technique:  Glidescope    Laryngoscope size: [place] 3  Tube size (mm):  7 5    Tube type:  Cuffed    Number of attempts:  1    Tube visualized through cords: yes    Placement assessment:     ETT to lip:  23    ETT to teeth:  22    Tube secured with:  ETT rosales    Breath sounds:  Equal and absent over the epigastrium    Placement verification: chest rise, CXR verification, direct visualization, equal breath sounds, ETCO2 detector, tube exhalation and capnography      End Tidal CO2 (mm HG):  50  Post-procedure details:     Patient tolerance of procedure:   Tolerated well, no immediate complications  CriticalCare Time  Performed by: Rubi Jerome DO  Authorized by: Rubi Jerome DO     Critical care provider statement: Critical care time (minutes):  80    Critical care was necessary to treat or prevent imminent or life-threatening deterioration of the following conditions:  Circulatory failure, CNS failure or compromise, shock and respiratory failure    Critical care was time spent personally by me on the following activities:  Ordering and review of laboratory studies, ordering and review of radiographic studies, re-evaluation of patient's condition, discussions with consultants, evaluation of patient's response to treatment, obtaining history from patient or surrogate, development of treatment plan with patient or surrogate, review of old charts, ventilator management and examination of patient             ED Course  ED Course as of Jan 17 1544   Sun Jan 17, 2021   1009 D/w patient's wife Eduardo Dunne via phone: confirmed full code status  Baseline mental status is occasionally confused to days/words but awake and interactive  States he has been coughing a lot recently      1024 VBG:  Profound respiratory acidosis  Oxygenation is adequate  Given patient's inadequate ventilation and resultant hypoxemia with altered mental status, he required endotracheal intubation which was completed as per the procedure note  He developed some post intubation hypertension that persisted despite IV fluid bolus  IV norepinephrine was started to stabilize his blood pressure  Plan to obtain CT head and CTA chest/abdomen-pelvis further evaluation in addition to sepsis workup  1119 1  WBC wnl   2  Hg/Hct wnl   3  Plt wnl   4  Electrolytes wnl   5  INR wnl; PTT wnl  6  Lactic acid below reference range  7  Troponin nonzero but within reference range          1222 CTs completed and awaiting interpretation      1350 IMPRESSION:     No acute intracranial abnormality        Stable areas of encephalomalacia as described     CT head without contrast   1351 IMPRESSION:     Perihilar groundglass is favored to be on the basis of pulmonary edema rather than infection  Cardiomegaly is present      Right lower lobe endobronchial mucous plugging with associated atelectasis      Tip of the feeding tube is terminating in stomach with the sidehole located at the level of the gastroesophageal junction  Recommend advancement  No evidence for bowel obstruction      The study was marked in EP[employer] for immediate notification  PE Study with CT abdomen & pelvis with contrast    Orogastric tube was advanced following this CT finding  1351 I was delayed in further evaluating the patient due to the need for a clinical procedure  1400 I d/w Dr Tati Lance who advised transfer for critical care capabilities,      [id number] D/w Dr Scooby Mares of [facility] critical care  Accepts in transfer to [facility] to arrange transfer      [phone] Patient's wife was updated by phone regarding his condition and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

## 2021-01-17 NOTE — RESPIRATORY THERAPY NOTE
Respiratory called due to "vent turned off"  While entering the room, Dr Linda Samuels attempting a central line and pt laying reverse Trendelenburg  Pt high pressure alarming with low Vt and VC  Settings changed to [id number]%  Pt getting better Vt (90cc prior to 415cc) with decrease in Vt  Dr Linda Samuels aware of the changes  Will continue to monitor

## 2021-01-17 NOTE — RESPIRATORY THERAPY NOTE
Pt transported to CT by [facility] with 100% O2  Pt was then put on the transport vent during scan  Pt did desaturate into the low 80's when being moved out of the  CT scanner  Pt was hyperventilated with 100% O2 and returned into the 90's  Pt was transported back to Ed room 7 and placed back on the vent of [id number]%  ABG results from prior to transport to CT reviewed  No changes to be made at current time  Will continue to monitor until pt transferred

## 2021-01-17 NOTE — PROGRESS NOTES
Vancomycin Assessment    Timmy Mon is a 63 y o  male who is currently receiving vancomycin 2000 mg every 12 hours for Pneumonia     Relevant clinical data and objective history reviewed:  Creatinine   Date Value Ref Range Status   01/17/2021 1 34 (H) 0 60 - 1 30 mg/dL Final     Comment:     Standardized to IDMS reference method   03/15/2019 3 39 (H) 0 60 - 1 30 mg/dL Final     Comment:     Standardized to IDMS reference method   03/14/2019 3 14 (H) 0 60 - 1 30 mg/dL Final     Comment:     Standardized to IDMS reference method   04/09/2015 0 67 0 60 - 1 30 mg/dL Final     Comment:     Standardized to IDMS reference method   11/15/2014 0 70 0 60 - 1 30 mg/dL Final     Comment:     Standardized to IDMS reference method   11/02/2014 0 86 0 60 - 1 30 mg/dL Final     Comment:     Standardized to IDMS reference method     Vancomycin Rm   Date Value Ref Range Status   03/04/2019 20 3 ug/mL Final     /57   Pulse 81   Temp 100 2 °F (37 9 °C)   Resp (!) 27   Ht 5' 11" (1 803 m)   Wt (!) 168 kg (370 lb 6 oz)   SpO2 94%   BMI 51 66 kg/m²   No intake/output data recorded  Lab Results   Component Value Date/Time    BUN 24 01/17/2021 10:03 AM    BUN 17 04/09/2015 11:41 AM    WBC 7 33 01/17/2021 10:03 AM    WBC 6 71 11/02/2014 05:56 AM    HGB 12 5 01/17/2021 10:03 AM    HGB 11 9 (L) 11/02/2014 05:56 AM    HCT 45 4 01/17/2021 10:03 AM    HCT 39 3 11/02/2014 05:56 AM    MCV 85 01/17/2021 10:03 AM    MCV 81 (L) 11/02/2014 05:56 AM     01/17/2021 10:03 AM     11/02/2014 05:56 AM     Temp Readings from Last 3 Encounters:   01/17/21 100 2 °F (37 9 °C)   01/17/21 100 4 °F (38 °C)   05/24/19 99 9 °F (37 7 °C)     Vancomycin Days of Therapy: 1    Assessment/Plan  The patient is currently on vancomycin utilizing scheduled dosing based on adjusted body weight (due to obesity)    Baseline risks associated with therapy include: pre-existing renal impairment, concomitant nephrotoxic medications, advanced age, and dehydration  The patient is currently receiving 2000 mg every 12 hours and is clinically appropriate and dose will be continued  Pharmacy will also follow closely for s/sx of nephrotoxicity, infusion reactions, and appropriateness of therapy  BMP and CBC will be ordered per protocol  Plan for trough as patient approaches steady state, prior to the 4th  dose at approximately 0530 on 1-19  Due to infection severity, will target a trough of 15-20 (appropriate for most indications)   Pharmacy will continue to follow the patients culture results and clinical progress daily      Sara Eugene, Pharmacist

## 2021-01-18 ENCOUNTER — APPOINTMENT (INPATIENT)
Dept: RADIOLOGY | Facility: HOSPITAL | Age: 61
DRG: 130 | End: 2021-01-18
Payer: COMMERCIAL

## 2021-01-18 PROBLEM — K92.2 GI BLEED: Status: RESOLVED | Noted: 2019-02-22 | Resolved: 2021-01-18

## 2021-01-18 PROBLEM — U07.1 PNEUMONIA DUE TO COVID-19 VIRUS: Status: ACTIVE | Noted: 2021-01-18

## 2021-01-18 PROBLEM — J12.82 PNEUMONIA DUE TO COVID-19 VIRUS: Status: ACTIVE | Noted: 2021-01-18

## 2021-01-18 PROBLEM — J96.02 ACUTE RESPIRATORY FAILURE WITH HYPOXIA AND HYPERCARBIA (HCC): Status: ACTIVE | Noted: 2021-01-18

## 2021-01-18 PROBLEM — I48.91 ATRIAL FIBRILLATION (HCC): Chronic | Status: ACTIVE | Noted: 2021-01-18

## 2021-01-18 PROBLEM — J96.01 ACUTE RESPIRATORY FAILURE WITH HYPOXIA AND HYPERCARBIA (HCC): Status: ACTIVE | Noted: 2021-01-18

## 2021-01-18 LAB
ABO GROUP BLD BPU: NORMAL
ALBUMIN SERPL BCP-MCNC: 2.9 G/DL (ref 3.5–5)
ALP SERPL-CCNC: 86 U/L (ref 46–116)
ALT SERPL W P-5'-P-CCNC: 20 U/L (ref 12–78)
ANION GAP SERPL CALCULATED.3IONS-SCNC: 12 MMOL/L (ref 4–13)
AST SERPL W P-5'-P-CCNC: 20 U/L (ref 5–45)
BASOPHILS # BLD AUTO: 0.01 THOUSANDS/ΜL (ref 0–0.1)
BASOPHILS NFR BLD AUTO: 0 % (ref 0–1)
BILIRUB SERPL-MCNC: 0.53 MG/DL (ref 0.2–1)
BPU ID: NORMAL
BUN SERPL-MCNC: 36 MG/DL (ref 5–25)
CALCIUM ALBUM COR SERPL-MCNC: 9.2 MG/DL (ref 8.3–10.1)
CALCIUM SERPL-MCNC: 8.3 MG/DL (ref 8.3–10.1)
CHLORIDE SERPL-SCNC: 105 MMOL/L (ref 100–108)
CO2 SERPL-SCNC: 24 MMOL/L (ref 21–32)
CREAT SERPL-MCNC: 1.5 MG/DL (ref 0.6–1.3)
CRP SERPL QL: 30 MG/L
D DIMER PPP FEU-MCNC: 1.02 UG/ML FEU
EOSINOPHIL # BLD AUTO: 0 THOUSAND/ΜL (ref 0–0.61)
EOSINOPHIL NFR BLD AUTO: 0 % (ref 0–6)
ERYTHROCYTE [DISTWIDTH] IN BLOOD BY AUTOMATED COUNT: 17.5 % (ref 11.6–15.1)
FERRITIN SERPL-MCNC: 31 NG/ML (ref 8–388)
GFR SERPL CREATININE-BSD FRML MDRD: 50 ML/MIN/1.73SQ M
GLUCOSE SERPL-MCNC: 107 MG/DL (ref 65–140)
GLUCOSE SERPL-MCNC: 119 MG/DL (ref 65–140)
GLUCOSE SERPL-MCNC: 120 MG/DL (ref 65–140)
GLUCOSE SERPL-MCNC: 124 MG/DL (ref 65–140)
GLUCOSE SERPL-MCNC: 126 MG/DL (ref 65–140)
HBV CORE AB SER QL: NORMAL
HBV CORE IGM SER QL: NORMAL
HBV SURFACE AG SER QL: NORMAL
HCT VFR BLD AUTO: 39.8 % (ref 36.5–49.3)
HCV AB SER QL: NORMAL
HGB BLD-MCNC: 11.9 G/DL (ref 12–17)
IMM GRANULOCYTES # BLD AUTO: 0.05 THOUSAND/UL (ref 0–0.2)
IMM GRANULOCYTES NFR BLD AUTO: 1 % (ref 0–2)
LYMPHOCYTES # BLD AUTO: 0.44 THOUSANDS/ΜL (ref 0.6–4.47)
LYMPHOCYTES NFR BLD AUTO: 8 % (ref 14–44)
MCH RBC QN AUTO: 23.4 PG (ref 26.8–34.3)
MCHC RBC AUTO-ENTMCNC: 29.9 G/DL (ref 31.4–37.4)
MCV RBC AUTO: 78 FL (ref 82–98)
MONOCYTES # BLD AUTO: 0.21 THOUSAND/ΜL (ref 0.17–1.22)
MONOCYTES NFR BLD AUTO: 4 % (ref 4–12)
NEUTROPHILS # BLD AUTO: 4.51 THOUSANDS/ΜL (ref 1.85–7.62)
NEUTS SEG NFR BLD AUTO: 87 % (ref 43–75)
NRBC BLD AUTO-RTO: 0 /100 WBCS
NT-PROBNP SERPL-MCNC: 1994 PG/ML
PLATELET # BLD AUTO: 166 THOUSANDS/UL (ref 149–390)
PMV BLD AUTO: 11.3 FL (ref 8.9–12.7)
POTASSIUM SERPL-SCNC: 4 MMOL/L (ref 3.5–5.3)
PROCALCITONIN SERPL-MCNC: 0.16 NG/ML
PROT SERPL-MCNC: 6.8 G/DL (ref 6.4–8.2)
RBC # BLD AUTO: 5.08 MILLION/UL (ref 3.88–5.62)
SODIUM SERPL-SCNC: 141 MMOL/L (ref 136–145)
UNIT DISPENSE STATUS: NORMAL
UNIT PRODUCT CODE: NORMAL
UNIT RH: NORMAL
WBC # BLD AUTO: 5.22 THOUSAND/UL (ref 4.31–10.16)

## 2021-01-18 PROCEDURE — 85025 COMPLETE CBC W/AUTO DIFF WBC: CPT | Performed by: INTERNAL MEDICINE

## 2021-01-18 PROCEDURE — 83880 ASSAY OF NATRIURETIC PEPTIDE: CPT | Performed by: INTERNAL MEDICINE

## 2021-01-18 PROCEDURE — XW13325 TRANSFUSION OF CONVALESCENT PLASMA (NONAUTOLOGOUS) INTO PERIPHERAL VEIN, PERCUTANEOUS APPROACH, NEW TECHNOLOGY GROUP 5: ICD-10-PCS | Performed by: INTERNAL MEDICINE

## 2021-01-18 PROCEDURE — 85379 FIBRIN DEGRADATION QUANT: CPT | Performed by: INTERNAL MEDICINE

## 2021-01-18 PROCEDURE — 71045 X-RAY EXAM CHEST 1 VIEW: CPT

## 2021-01-18 PROCEDURE — 82948 REAGENT STRIP/BLOOD GLUCOSE: CPT

## 2021-01-18 PROCEDURE — 86140 C-REACTIVE PROTEIN: CPT | Performed by: INTERNAL MEDICINE

## 2021-01-18 PROCEDURE — 99291 CRITICAL CARE FIRST HOUR: CPT | Performed by: PHYSICIAN ASSISTANT

## 2021-01-18 PROCEDURE — 94760 N-INVAS EAR/PLS OXIMETRY 1: CPT

## 2021-01-18 PROCEDURE — 82728 ASSAY OF FERRITIN: CPT | Performed by: INTERNAL MEDICINE

## 2021-01-18 PROCEDURE — 94002 VENT MGMT INPAT INIT DAY: CPT

## 2021-01-18 PROCEDURE — 80053 COMPREHEN METABOLIC PANEL: CPT | Performed by: INTERNAL MEDICINE

## 2021-01-18 PROCEDURE — 94150 VITAL CAPACITY TEST: CPT

## 2021-01-18 PROCEDURE — 84145 PROCALCITONIN (PCT): CPT | Performed by: INTERNAL MEDICINE

## 2021-01-18 PROCEDURE — NC001 PR NO CHARGE: Performed by: PHYSICIAN ASSISTANT

## 2021-01-18 RX ORDER — PANTOPRAZOLE SODIUM 40 MG/1
40 INJECTION, POWDER, FOR SOLUTION INTRAVENOUS
Status: DISCONTINUED | OUTPATIENT
Start: 2021-01-19 | End: 2021-01-18

## 2021-01-18 RX ORDER — BUMETANIDE 1 MG/1
2 TABLET ORAL DAILY
Status: DISCONTINUED | OUTPATIENT
Start: 2021-01-19 | End: 2021-01-27

## 2021-01-18 RX ORDER — PANTOPRAZOLE SODIUM 40 MG/1
40 TABLET, DELAYED RELEASE ORAL
Status: DISCONTINUED | OUTPATIENT
Start: 2021-01-19 | End: 2021-02-02

## 2021-01-18 RX ADMIN — VANCOMYCIN HYDROCHLORIDE 2000 MG: 1 INJECTION, POWDER, LYOPHILIZED, FOR SOLUTION INTRAVENOUS at 05:56

## 2021-01-18 RX ADMIN — Medication 20 MG: at 08:22

## 2021-01-18 RX ADMIN — CEFEPIME HYDROCHLORIDE 2000 MG: 2 INJECTION, POWDER, FOR SOLUTION INTRAVENOUS at 00:47

## 2021-01-18 RX ADMIN — PROPOFOL 30 MCG/KG/MIN: 10 INJECTION, EMULSION INTRAVENOUS at 00:48

## 2021-01-18 RX ADMIN — OXYCODONE HYDROCHLORIDE AND ACETAMINOPHEN 1000 MG: 500 TABLET ORAL at 08:22

## 2021-01-18 RX ADMIN — DEXAMETHASONE SODIUM PHOSPHATE 15.6 MG: 10 INJECTION, SOLUTION INTRAMUSCULAR; INTRAVENOUS at 17:01

## 2021-01-18 RX ADMIN — ENOXAPARIN SODIUM 60 MG: 60 INJECTION SUBCUTANEOUS at 17:01

## 2021-01-18 RX ADMIN — PROPOFOL 20 MCG/KG/MIN: 10 INJECTION, EMULSION INTRAVENOUS at 13:03

## 2021-01-18 RX ADMIN — ZINC SULFATE 220 MG (50 MG) CAPSULE 220 MG: CAPSULE at 08:22

## 2021-01-18 RX ADMIN — CEFEPIME HYDROCHLORIDE 2000 MG: 2 INJECTION, POWDER, FOR SOLUTION INTRAVENOUS at 08:31

## 2021-01-18 RX ADMIN — DEXAMETHASONE SODIUM PHOSPHATE 15.6 MG: 10 INJECTION, SOLUTION INTRAMUSCULAR; INTRAVENOUS at 04:14

## 2021-01-18 RX ADMIN — ATORVASTATIN CALCIUM 40 MG: 40 TABLET, FILM COATED ORAL at 21:38

## 2021-01-18 RX ADMIN — OXYCODONE HYDROCHLORIDE AND ACETAMINOPHEN 1000 MG: 500 TABLET ORAL at 21:38

## 2021-01-18 RX ADMIN — Medication 2000 UNITS: at 08:22

## 2021-01-18 RX ADMIN — PROPOFOL 30 MCG/KG/MIN: 10 INJECTION, EMULSION INTRAVENOUS at 03:31

## 2021-01-18 RX ADMIN — PROPOFOL 25 MCG/KG/MIN: 10 INJECTION, EMULSION INTRAVENOUS at 10:46

## 2021-01-18 RX ADMIN — ENOXAPARIN SODIUM 60 MG: 60 INJECTION SUBCUTANEOUS at 04:15

## 2021-01-18 RX ADMIN — CHLORHEXIDINE GLUCONATE 0.12% ORAL RINSE 15 ML: 1.2 LIQUID ORAL at 08:22

## 2021-01-18 RX ADMIN — PROPOFOL 30 MCG/KG/MIN: 10 INJECTION, EMULSION INTRAVENOUS at 08:22

## 2021-01-18 NOTE — PLAN OF CARE
Problem: Prexisting or High Potential for Compromised Skin Integrity  Goal: Skin integrity is maintained or improved  Description: INTERVENTIONS:  - Identify patients at risk for skin breakdown  - Assess and monitor skin integrity  - Assess and monitor nutrition and hydration status  - Monitor labs   - Assess for incontinence   - Turn and reposition patient  - Assist with mobility/ambulation  - Relieve pressure over bony prominences  - Avoid friction and shearing  - Provide appropriate hygiene as needed including keeping skin clean and dry  - Evaluate need for skin moisturizer/barrier cream  - Collaborate with interdisciplinary team   - Patient/family teaching  - Consider wound care consult   Outcome: Not Progressing     Problem: Potential for Falls  Goal: Patient will remain free of falls  Description: INTERVENTIONS:  - Assess patient frequently for physical needs  -  Identify cognitive and physical deficits and behaviors that affect risk of falls    -  [place] fall precautions as indicated by assessment   - Educate patient/family on patient safety including physical limitations  - Instruct patient to call for assistance with activity based on assessment  - Modify environment to reduce risk of injury  - Consider OT/PT consult to assist with strengthening/mobility  Outcome: Not Progressing     Problem: SAFETY,RESTRAINT: NV/NON-SELF DESTRUCTIVE BEHAVIOR  Goal: Remains free of harm/injury (restraint for non violent/non self-detsructive behavior)  Description: INTERVENTIONS:  - Instruct patient/family regarding restraint use   - Assess and monitor physiologic and psychological status   - Provide interventions and comfort measures to meet assessed patient needs   - Identify and implement measures to help patient regain control  - Assess readiness for release of restraint   Outcome: Not Progressing  Goal: Returns to optimal restraint-free functioning  Description: INTERVENTIONS:  - Assess the patient's behavior and symptoms that indicate continued need for restraint  - Identify and implement measures to help patient regain control  - Assess readiness for release of restraint   Outcome: Not Progressing

## 2021-01-18 NOTE — PROGRESS NOTES
The patient's vancomycin therapy has been completed/discontinued  Thank you for allowing us to take part in this patient's care   Pharmacy will sign-off now; please call or re-consult if there are any questions

## 2021-01-18 NOTE — UTILIZATION REVIEW
Initial Clinical Review    Admission: Date/Time/Statement:   Admission Orders (From admission, onward)     Ordered        01/17/21 1649  INPATIENT ADMISSION  Once                   Orders Placed This Encounter   Procedures    INPATIENT ADMISSION     Standing Status:   Standing     Number of Occurrences:   1     Order Specific Question:   Level of Care     Answer:   Critical Care [15]     Order Specific Question:   Estimated length of stay     Answer:   More than 2 Midnights     Order Specific Question:   Certification     Answer:   I certify that inpatient services are medically necessary for this patient for a duration of greater than two midnights  See H&P and MD Progress Notes for additional information about the patient's course of treatment  Assessment/Plan: 62 yo m w/hx stroke, htn transferred from [address] [place] [address] ED to [address] ICU admitted as inpatient due to COVID 19 pneumonia and hypercapnic resp failure  Presented to [facility] ED from nursing home with altered mental status, fever, and resp distress, found to be COVID positive within the past week  Worsened over prior 24 hrs with mi 80's sat on 3 liters  Improved to 90% on 8 liters  Was somnolent with septic workup in progress; determined hypercapnic, hypoxic; baseline w/confusion but interactive  imaging showed groundglass opacities with superimposed bacterial LLL pneumonia  Decision made to intubate, developed hypotension requiring IVF bolus & pressors  Decision then made to transfer for higher level of care for critical care admission  On arrival remained intubated on pressors, which were titrated off  He is in septic and hypovolemic shock  Convalescent plasma ordered, inflammatory workup in progress with COVID treatment & IV antbx  1/18: remains intubated, sedated  Weaning off propofol to switch to precedex  Continue IV antbx, lovenox given for elevated d dimer  Continue COVID vitamins, nebs, IV steroid       Temperature Pulse Respirations Blood Pressure SpO2   01/17/21 1643 01/17/21 1643 01/17/21 1643 01/17/21 1643 01/17/21 1624   100 2 °F (37 9 °C) 81 (!) 27 107/57 96 %         Pain Score       01/17/21 2000       No Pain          Wt Readings from Last 1 Encounters:   01/18/21 (!) 169 kg (373 lb 0 3 oz)     Additional Vital Signs:   Date/Time  Temp  Pulse  Resp BP MAP  SpO2 FiO2 (%) O2 Device   01/18/21 1152         98 %     01/18/21 1100  99 °F (37 2 °C)  73   113/64 83 97 %     01/18/21 0930  99 7 °F (37 6 °C)  73   95/53 68 97 %     01/18/21 0835         97 % 40 Ventilator   01/18/21 0830  99 7 °F (37 6 °C)  82   119/66 81 98 %     01/18/21 0730  99 5 °F (37 5 °C)  76  23Abnormal  127/62 85 98 %     01/18/21 0400  99 °F (37 2 °C)  72  22 107/65 82 95 %     01/18/21 0304         95 %     01/18/21 0300  98 6 °F (37 °C)  64  22 108/61 80 96 %     01/18/21 0230  98 6 °F (37 °C)  66  22 111/62 79 95 %     01/18/21 0214  98 4 °F (36 9 °C)  67  22 104/62       01/18/21 0200  98 6 °F (37 °C)  70  22 104/62 78 93 %     01/18/21 0100  98 6 °F (37 °C)  68  22 98/54 71 94 %     01/18/21 0027  98 8 °F (37 1 °C)  68  22 109/60  94 %     01/18/21 0000  98 4 °F (36 9 °C)  69  22 109/60 79 95 %     01/17/21 2100  99 °F (37 2 °C)  66  22 95/54 70 93 %     01/17/21 2000  99 1 °F (37 3 °C)  67  20 109/65 82 94 %     01/17/21 1953         93 % 40 Ventilator   01/17/21 1900  99 7 °F (37 6 °C)  73  22 93/57 70 93 %     01/17/21 1820         97 % 40 Ventilator   01/17/21 1806  99 9 °F (37 7 °C)  68  28Abnormal  120/66 88 98 %         Pertinent Labs/Diagnostic Test Results:   1/17 PCXR: chf vs  Fluid overload  1/17 repeat PCXR: same as above  1/17 CT head: No acute intracranial abnormality      Stable areas of encephalomalacia   1/17 CTA chest a&p: Perihilar groundglass is favored to be on the basis of pulmonary edema rather than infection    Cardiomegaly is present    Right lower lobe endobronchial mucous plugging with associated atelectasis    Tip of the feeding tube is terminating in stomach with the sidehole located at the level of the gastroesophageal junction  Recommend advancement  No evidence for bowel obstruction  1/17 PCXR:No evidence of pneumothorax  Otherwise no change from chest radiograph performed earlier in the day  1/17 EKG: Sinus rhythm with 1st degree A-V block  Right axis deviation  Non-specific intra-ventricular conduction block    1/18 PCXR: ET tube and enteric tube remain in position    Probable mild pulmonary vascular congestion, improved        Results from last 7 days   Lab Units 01/17/21  1404   SARS-COV-2  Positive*     Results from last 7 days   Lab Units 01/18/21  0421 01/17/21  1714 01/17/21  1003   WBC Thousand/uL 5 22  --  7 33   HEMOGLOBIN g/dL 11 9*  --  12 5   HEMATOCRIT % 39 8  --  45 4   PLATELETS Thousands/uL 166 172 181   NEUTROS ABS Thousands/µL 4 51  --  5 84         Results from last 7 days   Lab Units 01/18/21  0421 01/17/21  1003   SODIUM mmol/L 141 143   POTASSIUM mmol/L 4 0 4 9   CHLORIDE mmol/L 105 104   CO2 mmol/L 24 33*   ANION GAP mmol/L 12 6   BUN mg/dL 36* 24   CREATININE mg/dL 1 50* 1 34*   EGFR ml/min/1 73sq m 50 57   CALCIUM mg/dL 8 3 8 3   MAGNESIUM mg/dL  --  2 4     Results from last 7 days   Lab Units 01/18/21  0421 01/17/21  1003   AST U/L 20 20   ALT U/L 20 26   ALK PHOS U/L 86 101   TOTAL PROTEIN g/dL 6 8 7 7   ALBUMIN g/dL 2 9* 3 3*   TOTAL BILIRUBIN mg/dL 0 53 0 20     Results from last 7 days   Lab Units 01/18/21  0548 01/18/21  0149   POC GLUCOSE mg/dl 120 119     Results from last 7 days   Lab Units 01/18/21  0421 01/17/21  1003   GLUCOSE RANDOM mg/dL 126 114     Results from last 7 days   Lab Units 01/17/21  1137   PH ART  7 343*   PCO2 ART mm Hg 50 5*   PO2 ART mm Hg 280 0*   HCO3 ART mmol/L 26 8   BASE EXC ART mmol/L 0 5   O2 CONTENT ART mL/dL 17 5   O2 HGB, ARTERIAL % 98 2*   ABG SOURCE  Radial, Left     Results from last 7 days   Lab Units 01/17/21  1003   PH IVONNE  7 113*   PCO2 IVONNE mm Hg 101 1*   PO2 IVONNE mm Hg 103 9*   HCO3 IVONNE mmol/L 31 6*   BASE EXC IVONNE mmol/L -0 7   O2 CONTENT IVONNE ml/dL 17 8   O2 HGB, VENOUS % 94 5*         Results from last 7 days   Lab Units 01/17/21  1714   CK TOTAL U/L 76     Results from last 7 days   Lab Units 01/17/21  1003   TROPONIN I ng/mL 0 02     Results from last 7 days   Lab Units 01/18/21  0421 01/17/21  1714   D-DIMER QUANTITATIVE ug/ml FEU 1 02* 1 15*     Results from last 7 days   Lab Units 01/17/21  1003   PROTIME seconds 13 4   INR  1 04   PTT seconds 33     Results from last 7 days   Lab Units 01/18/21  0421 01/17/21  1003   PROCALCITONIN ng/ml 0 16 0 17     Results from last 7 days   Lab Units 01/17/21  1003   LACTIC ACID mmol/L 0 4*     Results from last 7 days   Lab Units 01/18/21  0421 01/17/21  1714   NT-PRO BNP pg/mL 1,994* 3,765*     Results from last 7 days   Lab Units 01/18/21  0421 01/17/21  1714   FERRITIN ng/mL 31 21     Results from last 7 days   Lab Units 01/17/21  1714   HEP B S AG  Non-reactive   HEP C AB  Non-reactive   HEP B C IGM  Non-reactive   HEP B C TOTAL AB  Non-reactive         Results from last 7 days   Lab Units 01/18/21  0421 01/17/21  1714   CRP mg/L 30 0* 28 8*     Results from last 7 days   Lab Units 01/17/21  1404   INFLUENZA A PCR  Negative   INFLUENZA B PCR  Negative   RSV PCR  Negative       Results from last 7 days   Lab Units 01/17/21  1715 01/17/21  1003   BLOOD CULTURE  Received in Microbiology Lab  Culture in Progress  Received in Microbiology Lab  Culture in Progress     GRAM STAIN RESULT  Gram positive cocci in pairs and chains* Gram positive cocci in clusters*         Past Medical History:   Diagnosis Date    Allergic rhinitis     last assessed 9/12/12    Finger fracture, right     Closed fx of the middle phalanx of the right 5th finger  last assessed 1/30/14    Hemorrhoids     last assessed 2/10/14    Hyperlipidemia     Hypertension     Stroke [facility])      Present on Admission:   Atrial fibrillation ([facility] )   Pneumonia due to COVID-19 virus   Acute respiratory failure with hypoxia and hypercarbia (HCC)   CVA (cerebral vascular accident) ([facility] )      Admitting Diagnosis: COVID-19 [U07 1]  Age/Sex: 63 y o  male  Admission Orders:  Scheduled Medications:  ascorbic acid, 1,000 mg, Per NG Tube, Q12H ARACELIS  atorvastatin, 40 mg, Per NG Tube, HS  cefepime, 2,000 mg, Intravenous, Q8H  chlorhexidine, 15 mL, Swish & Spit, Q12H [facility]  cholecalciferol, 2,000 Units, Per NG Tube, Daily  dexamethasone, 0 1 mg/kg, Intravenous, Q12H  enoxaparin, 60 mg, Subcutaneous, Q12H  zinc sulfate, 220 mg, Per NG Tube, Daily    Followed by  Ghazal Courtney ON 1/25/2021] multivitamin with iron-minerals, 15 mL, Per NG Tube, Daily  omeprazole (PRILOSEC) suspension 2 mg/mL, 20 mg, Oral, Daily  vancomycin, 2,000 mg, Intravenous, Q12H      Continuous IV Infusions:  propofol, 5-50 mcg/kg/min, Intravenous, Titrated  Levophed, weaned off on arrival     PRN Meds:      Intubated/vented  Non violent restraints t limbs  Neuro checks q2h  Tele  scd's  Transfuse convalescent plasma  NPO    IP CONSULT TO CASE MANAGEMENT  IP CONSULT TO PHARMACY    Network Utilization Review Department  ATTENTION: Please call with any questions or concerns to [phone] and carefully listen to the prompts so that you are directed to the right person  All voicemails are confidential   Neptali Ho all requests for admission clinical reviews, approved or denied determinations and any other requests to dedicated fax number below belonging to the campus where the patient is receiving treatment   List of dedicated fax numbers for the Facilities:  [address] DENIALS (Administrative/Medical Necessity) [phone]   [address] [place] [address] (Maternity/NICU/Pediatrics) 261 [facility],[address] Suzy Sykes [address] ([address]) [address] [place] [address] Radha Gallagher 1481 [phone]   [place] [address] [phone]

## 2021-01-18 NOTE — PROGRESS NOTES
Critical Care Progress Note:  Patient changed over to PSV 5/6 and 40%  Tolerated well for >30 minutes   Extubated to NC  Patient phonating well and already talking to his wife    Alba Little

## 2021-01-18 NOTE — PROGRESS NOTES
Daily Progress Note - Critical Care   Juan Ibarra 63 y o  male MRN: [id number]  Unit/Bed#: ICU 10 Encounter: [id number]        ----------------------------------------------------------------------------------------  HPI/24hr events:   Patient is 78-year-old male for history of hypertension, status post aortic valve replacement, morbid obesity, history of CVA, mixed hyperlipidemia, left bundle-branch block resides in a nursing home patient was brought to [facility] ER with respiratory distress altered mental status fever and confirmed COVID positive  Intubated for hypercapnic respiratory failure and transferred to [facility]    Patient was started yesterday on the severe protocol for COVID-19  ventilator settings, CMV 22/450ml 40 % 8     No acute events overnight    ---------------------------------------------------------------------------------------  SUBJECTIVE  Patient intubated and sedated    Review of Systems   Unable to perform ROS: Intubated     Review of systems was unable to be performed secondary to intubated   ---------------------------------------------------------------------------------------  Assessment and Plan:    Neuro:  Acute encephalopathy secondary to hypercapnia and hypoxia, history of CVA with residual left-sided weakness,   · Berny neuro checks     · Sleep/wake cycle regulation as able   · CAM-ICU BID, delirium precautions  · Continuous infusions: Propofol wean off propofol consider switching to precedex   · Acute encephalopathy due to hypercapnia has resolved  · Continue Zoloft       CV:  Status post aortic valve replacement, paroxysmal AFib , shock has resolved   Patient is currently in sinus rhythm has not been on anticoagulation due to GI bleeding   Last echo in July of 2020 shows an EF of 50%, left bundle-branch block, aortic valve bioprosthesis, peak PA pressure of 42   Does cardiology visit 10/22 - patient refused Watchman device due to unable to be on anticoagulation   Maintain MAP >65    Pulm:  Acute hypoxic and hypercapnic respiratory failure   Initial venous blood gas showed a pH of 7 11 and a CO2 of 101 post intubation ABG shows 7 34 pCO2 50   Vent bundle   Daily weaning assessment   Maintain SpO2 >90%    Monitor peak and plateau airway pressures  Maintain Ppeak < 45cm H2O, Pplat < 30cm H2O      GI:  GERD and history of GI bleed   Stress ulcer prophylaxis: Omeprazole PO   Bowel regimen:  None currently      :   CKD stage 3    creatinine: 1 34 - last creatinine noted was 3 39 and 4 86 patient has a history of being on dialysis status post a last admission which for now has been stopped    Strict q2 h I/O monitoring   Monitor Creatine / BUN       F/E/N:    Replete electrolytes with as needed to maintain K >4 0, Mag >2 0, Phos >3 0   Nutrition:  NPO   Maintenance fluids :  None      Heme/Onc:    Transfuse for hemoglobin <7 0    Transfuse for plts <15,000 or < 50,000 in the presence of bleeding    VTE prophylaxis:  Heparin, SCD's to BLE    Endo:  Gout   Hold allopurnial    Insulin sliding scale  Lab Results   Component Value Date    HGBA1C 6 2 02/13/2019    HGBA1C 5 8 (H) 07/18/2014            ID:  [id number] ammonia diagnosed on 01/16/2021   severe COVID pathway   Plasma also ordered 01/17/2020    Started on cefepime and vancomycin for concern of superimposed bacterial process,  procalcitonin negative x2 can deescalate to discontinue antibiotic   Continue Lovenox D-dimer is 1 15 and 1 02   Vit C,D, Zinc, Avtorstatin, famotidine , nebs,    Dexamethasone 0 1 milligram/kilogram q 12   Ferritin 21, proBNP 3765, procalcitonin 0 17         MSK/Skin:    Reposition q2h, eliminate pressure points while in bed   Close skin surveillance     Disposition: Continue Critical Care   Code Status: Level 1 - Full Code  ---------------------------------------------------------------------------------------  ICU CORE MEASURES    Prophylaxis   VTE Pharmacologic Prophylaxis: Enoxaparin (Lovenox)  VTE Mechanical Prophylaxis: sequential compression device  Stress Ulcer Prophylaxis: Omeprazole PO    ABCDE Protocol (if indicated)  Plan to perform spontaneous awakening trial today? Yes  Plan to perform spontaneous breathing trial today? Yes  Obvious barriers to extubation? Yes  CAM-ICU: Negative    Invasive Devices Review  Invasive Devices     Central Venous Catheter Line            CVC Central Lines 21 Triple Left Femoral less than 1 day          Peripheral Intravenous Line            Peripheral IV 21 Left Antecubital less than 1 day    Peripheral IV 21 Right Hand less than 1 day          Drain            NG/OG/Enteral Tube Orogastric 16 Fr Left mouth less than 1 day    Urethral Catheter Straight-tip 16 Fr  less than 1 day          Airway            ETT  Hi-Lo; Cuffed 7 5 mm less than 1 day              Can any invasive devices be discontinued today? Yes  ---------------------------------------------------------------------------------------  OBJECTIVE    Vitals   Vitals:    21 0300 21 0304 21 0400 21 0538   BP: 108/61  107/65    Pulse: 64  72    Resp: 22  22    Temp: 98 6 °F (37 °C)  99 °F (37 2 °C)    SpO2: 96% 95% 95%    Weight:    (!) 169 kg (373 lb 0 3 oz)   Height:         Temp (24hrs), Av 1 °F (37 3 °C), Min:97 5 °F (36 4 °C), Max:100 4 °F (38 °C)  Current: Temperature: 99 °F (37 2 °C)    Respiratory:  SpO2: SpO2: 95 %       Invasive/non-invasive ventilation settings   Respiratory    Lab Data (Last 4 hours)    None         O2/Vent Data (Last 4 hours)    None                Physical Exam  Vitals signs and nursing note reviewed  Constitutional:       General: He is sleeping  He is not in acute distress  Appearance: He is obese  He is ill-appearing and toxic-appearing  Interventions: He is sedated and intubated  HENT:      Head: Normocephalic and atraumatic  Eyes:      Pupils: Pupils are equal, round, and reactive to light     Neck: Musculoskeletal: Normal range of motion and neck supple  Cardiovascular:      Rate and Rhythm: Normal rate and regular rhythm  Pulses: Normal pulses  Heart sounds: Normal heart sounds  Pulmonary:      Effort: Pulmonary effort is normal  He is intubated  Breath sounds: Rhonchi and rales present  Abdominal:      General: Bowel sounds are normal  There is no distension  Palpations: Abdomen is soft  Tenderness: There is no abdominal tenderness  There is no guarding  Genitourinary:     Comments: Turner catheter  Musculoskeletal: Normal range of motion  General: Swelling present  Right lower leg: Edema present  Left lower leg: Edema present  Skin:     General: Skin is warm and dry  Capillary Refill: Capillary refill takes less than 2 seconds  Coloration: Skin is pale     Neurological:      Comments: Patient is intubated and sedated         Laboratory and Diagnostics:  Results from last 7 days   Lab Units 01/18/21  0421 01/17/21  1714 01/17/21  1003   WBC Thousand/uL 5 22  --  7 33   HEMOGLOBIN g/dL 11 9*  --  12 5   HEMATOCRIT % 39 8  --  45 4   PLATELETS Thousands/uL 166 172 181   NEUTROS PCT % 87*  --  80*   MONOS PCT % 4  --  13*     Results from last 7 days   Lab Units 01/18/21  0421 01/17/21  1003   SODIUM mmol/L 141 143   POTASSIUM mmol/L 4 0 4 9   CHLORIDE mmol/L 105 104   CO2 mmol/L 24 33*   ANION GAP mmol/L 12 6   BUN mg/dL 36* 24   CREATININE mg/dL 1 50* 1 34*   CALCIUM mg/dL 8 3 8 3   GLUCOSE RANDOM mg/dL 126 114   ALT U/L 20 26   AST U/L 20 20   ALK PHOS U/L 86 101   ALBUMIN g/dL 2 9* 3 3*   TOTAL BILIRUBIN mg/dL 0 53 0 20     Results from last 7 days   Lab Units 01/17/21  1003   MAGNESIUM mg/dL 2 4      Results from last 7 days   Lab Units 01/17/21  1003   INR  1 04   PTT seconds 33      Results from last 7 days   Lab Units 01/17/21  1003   TROPONIN I ng/mL 0 02     Results from last 7 days   Lab Units 01/17/21  1003   LACTIC ACID mmol/L 0 4* ABG:  Results from last 7 days   Lab Units 01/17/21  1137   PH ART  7 343*   PCO2 ART mm Hg 50 5*   PO2 ART mm Hg 280 0*   HCO3 ART mmol/L 26 8   BASE EXC ART mmol/L 0 5   ABG SOURCE  Radial, Left     VBG:  Results from last 7 days   Lab Units 01/17/21  1137 01/17/21  1003   PH IVONNE   --  7 113*   PCO2 IVONNE mm Hg  --  101 1*   PO2 IVONNE mm Hg  --  103 9*   HCO3 IVONNE mmol/L  --  31 6*   BASE EXC IVONNE mmol/L  --  -0 7   ABG SOURCE  Radial, Left  --      Results from last 7 days   Lab Units 01/17/21  1003   PROCALCITONIN ng/ml 0 17       Micro  Results from last 7 days   Lab Units 01/17/21  1715 01/17/21  1003   BLOOD CULTURE  Received in Microbiology Lab  Culture in Progress  Received in Microbiology Lab  Culture in Progress  Received in Microbiology Lab  Culture in Progress  Received in Microbiology Lab  Culture in Progress  EKG: normal EKG, normal sinus rhythm, unchanged from previous tracings  Imaging:  I have personally reviewed pertinent reports  Intake and Output  I/O       01/16 0701 - 01/17 0700 01/17 0701 - 01/18 0700    I V  (mL/kg)  197 7 (1 2)    Blood  204    Total Intake(mL/kg)  401 7 (2 4)    Urine (mL/kg/hr)  400    Emesis/NG output  0    Total Output  400    Net  +1 7                  Height and Weights   Height: 5' 11" (180 3 cm)  IBW: 75 3 kg  Body mass index is 52 03 kg/m²  Weight (last 2 days)     Date/Time   Weight    01/18/21 0538   (!) 169 (373 02)    01/17/21 1650   (!) 168 (370 37)                Nutrition       Diet Orders   (From admission, onward)             Start     Ordered    01/17/21 1719  Room Service  Once     Question:  Type of Service  Answer:  Room Service- Not Appropriate    01/17/21 1718    01/17/21 1638  Diet NPO  Diet effective now     Question Answer Comment   Diet Type NPO    RD to adjust diet per protocol?  Yes        01/17/21 1649                      Active Medications  Scheduled Meds:  Current Facility-Administered Medications   Medication Dose Route Frequency Provider Last Rate    ascorbic acid  1,000 mg Per NG Tube Q12H [address] Denae Castillo PA-C      atorvastatin  40 mg Per NG Tube HS Denae Castillo PA-C      cefepime  2,000 mg Intravenous Q8H JIM RobertsC 2,000 mg (01/18/21 0047)    chlorhexidine  15 mL [facility] Denae Castillo [place]      cholecalciferol  2,000 Units Per NG Tube Daily Denae Castillo PA-C      dexamethasone  0 1 mg/kg Intravenous Q12H Denae Castillo PA-C 15 6 mg (01/18/21 0414)    enoxaparin  60 mg Subcutaneous Q12H Denae Castillo PA-C      zinc sulfate  220 mg Per NG Tube Daily Denae Castillo PA-C      Followed by   Mindi Hamilton ON 1/25/2021] multivitamin with iron-minerals  15 mL Per NG Tube Daily Denae Castillo PA-C      omeprazole (PRILOSEC) suspension 2 mg/mL  20 mg Oral Daily Denae Castillo PA-C      propofol  5-50 mcg/kg/min Intravenous Titrated JIM RobertsC 30 mcg/kg/min (01/18/21 0331)    vancomycin  2,000 mg Intravenous Q12H Denae Castillo PA-C 2,000 mg (01/18/21 0556)     Continuous Infusions:  propofol, 5-50 mcg/kg/min, Last Rate: 30 mcg/kg/min (01/18/21 0331)      PRN Meds:        Allergies   Allergies   Allergen Reactions    Amiodarone      Developed Rash    Penicillins Rash     However, has subsequently tolerated Cefazolin and Cefepime     ---------------------------------------------------------------------------------------  Advance Directive and Living Will:      Power of :    POLST:    ---------------------------------------------------------------------------------------  Care Time Delivered:   Upon my evaluation, this patient had a high probability of imminent or life-threatening deterioration due to Acute hypoxic and hypercapnic respiratory failure requiring intubation COVID-19 pneumonia, which required my direct attention, intervention, and personal management    I have personally provided 45 minutes ([phone] [phone]) of critical care time, exclusive of procedures, teaching, family meetings, and any prior time recorded by providers other than myself  Sonia Fonseca PA-C      Portions of the record may have been created with voice recognition software  Occasional wrong word or "sound a like" substitutions may have occurred due to the inherent limitations of voice recognition software    Read the chart carefully and recognize, using context, where substitutions have occurred

## 2021-01-19 LAB
ANION GAP SERPL CALCULATED.3IONS-SCNC: 10 MMOL/L (ref 4–13)
ATRIAL RATE: 73 BPM
BASOPHILS # BLD AUTO: 0 THOUSANDS/ΜL (ref 0–0.1)
BASOPHILS NFR BLD AUTO: 0 % (ref 0–1)
BUN SERPL-MCNC: 47 MG/DL (ref 5–25)
CALCIUM SERPL-MCNC: 8.4 MG/DL (ref 8.3–10.1)
CHLORIDE SERPL-SCNC: 104 MMOL/L (ref 100–108)
CO2 SERPL-SCNC: 27 MMOL/L (ref 21–32)
CREAT SERPL-MCNC: 1.74 MG/DL (ref 0.6–1.3)
EOSINOPHIL # BLD AUTO: 0 THOUSAND/ΜL (ref 0–0.61)
EOSINOPHIL NFR BLD AUTO: 0 % (ref 0–6)
ERYTHROCYTE [DISTWIDTH] IN BLOOD BY AUTOMATED COUNT: 17.7 % (ref 11.6–15.1)
GFR SERPL CREATININE-BSD FRML MDRD: 41 ML/MIN/1.73SQ M
GLUCOSE SERPL-MCNC: 137 MG/DL (ref 65–140)
GLUCOSE SERPL-MCNC: 151 MG/DL (ref 65–140)
GLUCOSE SERPL-MCNC: 159 MG/DL (ref 65–140)
HCT VFR BLD AUTO: 38.9 % (ref 36.5–49.3)
HGB BLD-MCNC: 11.5 G/DL (ref 12–17)
IMM GRANULOCYTES # BLD AUTO: 0.07 THOUSAND/UL (ref 0–0.2)
IMM GRANULOCYTES NFR BLD AUTO: 1 % (ref 0–2)
LYMPHOCYTES # BLD AUTO: 0.31 THOUSANDS/ΜL (ref 0.6–4.47)
LYMPHOCYTES NFR BLD AUTO: 5 % (ref 14–44)
MCH RBC QN AUTO: 23.4 PG (ref 26.8–34.3)
MCHC RBC AUTO-ENTMCNC: 29.6 G/DL (ref 31.4–37.4)
MCV RBC AUTO: 79 FL (ref 82–98)
MONOCYTES # BLD AUTO: 0.38 THOUSAND/ΜL (ref 0.17–1.22)
MONOCYTES NFR BLD AUTO: 6 % (ref 4–12)
NEUTROPHILS # BLD AUTO: 5.78 THOUSANDS/ΜL (ref 1.85–7.62)
NEUTS SEG NFR BLD AUTO: 88 % (ref 43–75)
NRBC BLD AUTO-RTO: 0 /100 WBCS
P AXIS: 24 DEGREES
PLATELET # BLD AUTO: 158 THOUSANDS/UL (ref 149–390)
PMV BLD AUTO: 10.9 FL (ref 8.9–12.7)
POTASSIUM SERPL-SCNC: 3.9 MMOL/L (ref 3.5–5.3)
PR INTERVAL: 210 MS
PROCALCITONIN SERPL-MCNC: 0.15 NG/ML
QRS AXIS: 112 DEGREES
QRSD INTERVAL: 138 MS
QT INTERVAL: 424 MS
QTC INTERVAL: 467 MS
RBC # BLD AUTO: 4.91 MILLION/UL (ref 3.88–5.62)
SODIUM SERPL-SCNC: 141 MMOL/L (ref 136–145)
T WAVE AXIS: 9 DEGREES
VENTRICULAR RATE: 73 BPM
WBC # BLD AUTO: 6.54 THOUSAND/UL (ref 4.31–10.16)

## 2021-01-19 PROCEDURE — 93010 ELECTROCARDIOGRAM REPORT: CPT | Performed by: INTERNAL MEDICINE

## 2021-01-19 PROCEDURE — 99232 SBSQ HOSP IP/OBS MODERATE 35: CPT | Performed by: INTERNAL MEDICINE

## 2021-01-19 PROCEDURE — 82948 REAGENT STRIP/BLOOD GLUCOSE: CPT

## 2021-01-19 PROCEDURE — 80048 BASIC METABOLIC PNL TOTAL CA: CPT | Performed by: NURSE PRACTITIONER

## 2021-01-19 PROCEDURE — 85025 COMPLETE CBC W/AUTO DIFF WBC: CPT | Performed by: NURSE PRACTITIONER

## 2021-01-19 PROCEDURE — 84145 PROCALCITONIN (PCT): CPT | Performed by: NURSE PRACTITIONER

## 2021-01-19 PROCEDURE — 87040 BLOOD CULTURE FOR BACTERIA: CPT | Performed by: NURSE PRACTITIONER

## 2021-01-19 RX ORDER — BISACODYL 10 MG
10 SUPPOSITORY, RECTAL RECTAL DAILY PRN
Status: DISCONTINUED | OUTPATIENT
Start: 2021-01-19 | End: 2021-02-17

## 2021-01-19 RX ORDER — METOPROLOL TARTRATE 50 MG/1
50 TABLET, FILM COATED ORAL 3 TIMES DAILY
Status: DISCONTINUED | OUTPATIENT
Start: 2021-01-19 | End: 2021-01-24

## 2021-01-19 RX ORDER — ASPIRIN 81 MG/1
81 TABLET ORAL DAILY
Status: DISCONTINUED | OUTPATIENT
Start: 2021-01-19 | End: 2021-02-02

## 2021-01-19 RX ORDER — POTASSIUM CHLORIDE 20 MEQ/1
20 TABLET, EXTENDED RELEASE ORAL ONCE
Status: COMPLETED | OUTPATIENT
Start: 2021-01-19 | End: 2021-01-19

## 2021-01-19 RX ADMIN — BUMETANIDE 2 MG: 1 TABLET ORAL at 09:13

## 2021-01-19 RX ADMIN — OXYCODONE HYDROCHLORIDE AND ACETAMINOPHEN 1000 MG: 500 TABLET ORAL at 21:27

## 2021-01-19 RX ADMIN — METOPROLOL TARTRATE 50 MG: 50 TABLET, FILM COATED ORAL at 21:27

## 2021-01-19 RX ADMIN — Medication 2000 UNITS: at 09:11

## 2021-01-19 RX ADMIN — VANCOMYCIN HYDROCHLORIDE 2000 MG: 1 INJECTION, POWDER, LYOPHILIZED, FOR SOLUTION INTRAVENOUS at 18:46

## 2021-01-19 RX ADMIN — DEXAMETHASONE SODIUM PHOSPHATE 15.6 MG: 10 INJECTION, SOLUTION INTRAMUSCULAR; INTRAVENOUS at 17:55

## 2021-01-19 RX ADMIN — ENOXAPARIN SODIUM 60 MG: 60 INJECTION SUBCUTANEOUS at 17:55

## 2021-01-19 RX ADMIN — CHLORHEXIDINE GLUCONATE 0.12% ORAL RINSE 15 ML: 1.2 LIQUID ORAL at 21:27

## 2021-01-19 RX ADMIN — METOPROLOL TARTRATE 50 MG: 50 TABLET, FILM COATED ORAL at 17:57

## 2021-01-19 RX ADMIN — OXYCODONE HYDROCHLORIDE AND ACETAMINOPHEN 1000 MG: 500 TABLET ORAL at 09:10

## 2021-01-19 RX ADMIN — ZINC SULFATE 220 MG (50 MG) CAPSULE 220 MG: CAPSULE at 09:11

## 2021-01-19 RX ADMIN — DEXAMETHASONE SODIUM PHOSPHATE 15.6 MG: 10 INJECTION, SOLUTION INTRAMUSCULAR; INTRAVENOUS at 05:10

## 2021-01-19 RX ADMIN — ASPIRIN 81 MG: 81 TABLET ORAL at 09:13

## 2021-01-19 RX ADMIN — CHLORHEXIDINE GLUCONATE 0.12% ORAL RINSE 15 ML: 1.2 LIQUID ORAL at 09:10

## 2021-01-19 RX ADMIN — ATORVASTATIN CALCIUM 40 MG: 40 TABLET, FILM COATED ORAL at 21:27

## 2021-01-19 RX ADMIN — ENOXAPARIN SODIUM 60 MG: 60 INJECTION SUBCUTANEOUS at 05:10

## 2021-01-19 RX ADMIN — METOPROLOL TARTRATE 50 MG: 50 TABLET, FILM COATED ORAL at 09:10

## 2021-01-19 RX ADMIN — POTASSIUM CHLORIDE 20 MEQ: 1500 TABLET, EXTENDED RELEASE ORAL at 09:11

## 2021-01-19 RX ADMIN — PANTOPRAZOLE SODIUM 40 MG: 40 TABLET, DELAYED RELEASE ORAL at 05:10

## 2021-01-19 NOTE — ASSESSMENT & PLAN NOTE
· Secondary to hypoxia/hypercapnia and history of CVA with residual Left-sided weakness  · Hypercapnia now resolved    · Continue serial neuro checks  · Delirium ppx/sleep wake cycle hygiene  · CAM-ICU BID  · Continuous sedation discontinued with extubation yesterday  · Avoid sedating medications  · Continue Zoloft

## 2021-01-19 NOTE — ASSESSMENT & PLAN NOTE
· Currently in sinus rhythm      · D-dimer 1 02 -- low intensity pathway   · Lovenox ppx  · Home regimen with Lovenox  · Continue telemetry  · Continue diltiazem and metoprolol   · [id number]

## 2021-01-19 NOTE — PROGRESS NOTES
Vancomycin Assessment    Nadia William is a 63 y o  male who is currently receiving vancomycin 2500mg IV q12 hours for MRSA suspected     Relevant clinical data and objective history reviewed:  Creatinine   Date Value Ref Range Status   01/19/2021 1 74 (H) 0 60 - 1 30 mg/dL Final     Comment:     Standardized to IDMS reference method   01/18/2021 1 50 (H) 0 60 - 1 30 mg/dL Final     Comment:     Standardized to IDMS reference method   01/17/2021 1 34 (H) 0 60 - 1 30 mg/dL Final     Comment:     Standardized to IDMS reference method   04/09/2015 0 67 0 60 - 1 30 mg/dL Final     Comment:     Standardized to IDMS reference method   11/15/2014 0 70 0 60 - 1 30 mg/dL Final     Comment:     Standardized to IDMS reference method   11/02/2014 0 86 0 60 - 1 30 mg/dL Final     Comment:     Standardized to IDMS reference method     Vancomycin Rm   Date Value Ref Range Status   03/04/2019 20 3 ug/mL Final     /76   Pulse 63   Temp 98 6 °F (37 °C)   Resp 17   Ht 5' 11" (1 803 m)   Wt (!) 169 kg (373 lb 0 3 oz)   SpO2 99%   BMI 52 03 kg/m²   I/O last 3 completed shifts: In: 778 7 [I V :524 7; Blood:204; IV Piggyback:50]  Out: 0137 [Urine:1115]  Lab Results   Component Value Date/Time    BUN 47 (H) 01/19/2021 05:12 AM    BUN 17 04/09/2015 11:41 AM    WBC 6 54 01/19/2021 05:12 AM    WBC 6 71 11/02/2014 05:56 AM    HGB 11 5 (L) 01/19/2021 05:12 AM    HGB 11 9 (L) 11/02/2014 05:56 AM    HCT 38 9 01/19/2021 05:12 AM    HCT 39 3 11/02/2014 05:56 AM    MCV 79 (L) 01/19/2021 05:12 AM    MCV 81 (L) 11/02/2014 05:56 AM     01/19/2021 05:12 AM     11/02/2014 05:56 AM     Temp Readings from Last 3 Encounters:   01/19/21 98 6 °F (37 °C)   01/17/21 100 4 °F (38 °C)   05/24/19 99 9 °F (37 7 °C)     Vancomycin Days of Therapy: 1    Assessment/Plan  The patient is currently on vancomycin utilizing scheduled dosing based on adjusted body weight (due to obesity)    Baseline risks associated with therapy include: pre-existing renal impairment  The patient is currently receiving 2500mg IV q12 hours and after clinical evaluation will be changed to 2g IV now, followed by 1750mg IV q12 hours  Pharmacy will also follow closely for s/sx of nephrotoxicity, infusion reactions, and appropriateness of therapy  BMP and CBC will be ordered per protocol  Plan for trough as patient approaches steady state, prior to the 5th  dose at approximately 1630 on 1/21/21  Due to infection severity, will target a trough of 15-20 (appropriate for most indications)   Pharmacy will continue to follow the patients culture results and clinical progress daily      Edel Wilson, Pharmacist

## 2021-01-19 NOTE — PROGRESS NOTES
Progress Note - Joanna Lorenz 1960, 63 y o  male MRN: [id number]    Unit/Bed#: ICU 10 Encounter: [id number]    Primary Care Provider: Caryle Forth, DO   Date and time admitted to hospital: 1/17/2021  4:16 PM        Acute respiratory failure with hypoxia and hypercarbia (HCC)  Assessment & Plan  · Secondary to COVID-19 pneumonia  · Extubated 1/18/21  · Currently on NC 2 L  · Titrate FiO2 for SpO2 > 90%  · COVID treatment as below  · Respiratory protocol  · Pulmonary toilet  · Incentive spirometer      Acute metabolic encephalopathy  Assessment & Plan  · Secondary to hypoxia/hypercapnia and history of CVA with residual Left-sided weakness  · Hypercapnia now resolved  · Continue serial neuro checks  · Delirium ppx/sleep wake cycle hygiene  · CAM-ICU BID  · Continuous sedation discontinued with extubation yesterday  · Avoid sedating medications  · Continue Zoloft    * Pneumonia due to COVID-19 virus  Assessment & Plan  · Confirmed positive 1/17  · Encourage self-proning / Lateral decubitus positioning   · CXR 1/18: improved vascular congestion    MEDICATIONS:  · Decadron 0 1 mg/kg day 3/10  · VC/Zinc day 3/7 to be followed by MVI  · VD, atorvastatin, PPI  · Lovenox ppx  · Received convalescent plasma 1/17  · Not candidate for remdesivir due to severe pathway    Atrial fibrillation ([facility] [address] )  Assessment & Plan  · Currently in sinus rhythm  · D-dimer 1 02 -- low intensity pathway   · Lovenox ppx  · Home regimen with Lovenox  · Continue telemetry  · Continue diltiazem and metoprolol   · [id number] 3    Essential hypertension  Assessment & Plan  · continue bumetanide, diltiazem, and metoprolol   · No longer requiring vasopressors         Hyperlipidemia  Assessment & Plan  · Continue atorvastatin     CVA (cerebral vascular accident) [facility])  Assessment & Plan  · Residual Left-sided weakness  · PT/OT  · Passed bedside swallow, started on diet  · Continue statin  · Restart aspirin    Aortic stenosis, mild  Assessment & Plan  · S/p AV replacement  · Continue diltiazem and metoprolol      ----------------------------------------------------------------------------------------  HPI/24hr events:  79-year-old male past medical history significant for aortic stenosis s/p valve replacement, hypertension, hyperlipidemia, history of CVA with left-sided residual, and morbid obesity  Patient initially presented to [facility] emergency room with increased respiratory distress altered mental status from [address]  He was found to be hypercarbic, and was worsening mental status the decision was made at that time to intubate  He was transferred to [address] [place] [address] for critical care increased monitoring  · Extubated, weaned down to nasal cannula  · Passed bedside swallow eval  · No acute events overnight       Disposition: Transfer to [facility] with Telemetry   Code Status: Level 1 - Full Code  ---------------------------------------------------------------------------------------  SUBJECTIVE  States that he is feeling better  Denies chest pain, shortness of breath, abdominal pain  C/o occasional non-productive cough  Review of Systems   Constitutional: Negative for chills, fatigue and fever  HENT: Negative  Eyes: Negative  Respiratory: Positive for cough  Negative for chest tightness, shortness of breath and wheezing  Cardiovascular: Positive for leg swelling  Negative for chest pain  Gastrointestinal: Negative for abdominal pain, diarrhea, nausea and vomiting  Endocrine: Negative  Genitourinary: Negative for difficulty urinating, dysuria and frequency  Musculoskeletal: Negative  Skin: Negative  Allergic/Immunologic: Negative  Neurological: Positive for weakness  Negative for dizziness, facial asymmetry, speech difficulty and light-headedness  Psychiatric/Behavioral: Negative        Review of systems was reviewed and negative unless stated above in HPI/24-hour events   ---------------------------------------------------------------------------------------  OBJECTIVE    Vitals   Vitals:    21 1700 21 1800 21 2000 21 2300   BP: 124/94 153/73 157/78 133/70   Pulse: 81 71 105 75   Resp: (!) 34  (!) 23 (!) 33   Temp: 99 3 °F (37 4 °C) 99 1 °F (37 3 °C) 99 1 °F (37 3 °C) 99 1 °F (37 3 °C)   SpO2: 99% 100% 97% 99%   Weight:       Height:         Temp (24hrs), Av 2 °F (37 3 °C), Min:98 8 °F (37 1 °C), Max:99 7 °F (37 6 °C)  Current: Temperature: 99 1 °F (37 3 °C)          Respiratory:  SpO2: SpO2: 99 %, SpO2 Device: O2 Device: Nasal cannula  Nasal Cannula O2 Flow Rate (L/min): 2 L/min    Invasive/non-invasive ventilation settings   Respiratory    Lab Data (Last 4 hours)    None         O2/Vent Data (Last 4 hours)    None                Physical Exam  Constitutional:       General: He is awake  He is not in acute distress  Appearance: He is obese  He is ill-appearing  He is not toxic-appearing  Interventions: He is not intubated  Nasal cannula in place  HENT:      Head: Normocephalic and atraumatic  Right Ear: External ear normal       Left Ear: External ear normal       Nose: Nose normal  No congestion or rhinorrhea  Mouth/Throat:      Mouth: Mucous membranes are moist       Pharynx: Oropharynx is clear  No oropharyngeal exudate or posterior oropharyngeal erythema  Eyes:      General: No scleral icterus  Extraocular Movements: Extraocular movements intact  Conjunctiva/sclera: Conjunctivae normal       Pupils: Pupils are equal, round, and reactive to light  Neck:      Musculoskeletal: Normal range of motion and neck supple  Vascular: No carotid bruit  Cardiovascular:      Rate and Rhythm: Normal rate and regular rhythm  Pulses:           Radial pulses are 2+ on the right side and 2+ on the left side  Dorsalis pedis pulses are 1+ on the right side and 1+ on the left side        Heart sounds: S1 normal and S2 normal  No murmur  No friction rub  No gallop  Pulmonary:      Effort: No accessory muscle usage or respiratory distress  He is not intubated  Breath sounds: Decreased breath sounds present  No wheezing, rhonchi or rales  Abdominal:      General: Bowel sounds are normal  There is no distension  Palpations: Abdomen is soft  Tenderness: There is no abdominal tenderness  Musculoskeletal: Normal range of motion  General: No swelling or tenderness  Right lower le+ Pitting Edema present  Left lower le+ Pitting Edema present  Lymphadenopathy:      Cervical: No cervical adenopathy  Skin:     General: Skin is warm and dry  Capillary Refill: Capillary refill takes less than 2 seconds  Coloration: Skin is pale  Findings: No erythema or rash  Neurological:      General: No focal deficit present  Mental Status: He is alert and oriented to person, place, and time  Mental status is at baseline  Cranial Nerves: No cranial nerve deficit  Sensory: No sensory deficit  Psychiatric:         Mood and Affect: Mood normal          Behavior: Behavior normal  Behavior is cooperative  Thought Content:  Thought content normal          Judgment: Judgment normal          Laboratory and Diagnostics:  Results from last 7 days   Lab Units 21  0421 21  1714 21  1003   WBC Thousand/uL 5 22  --  7 33   HEMOGLOBIN g/dL 11 9*  --  12 5   HEMATOCRIT % 39 8  --  45 4   PLATELETS Thousands/uL 166 172 181   NEUTROS PCT % 87*  --  80*   MONOS PCT % 4  --  13*     Results from last 7 days   Lab Units 21  0421 21  1003   SODIUM mmol/L 141 143   POTASSIUM mmol/L 4 0 4 9   CHLORIDE mmol/L 105 104   CO2 mmol/L 24 33*   ANION GAP mmol/L 12 6   BUN mg/dL 36* 24   CREATININE mg/dL 1 50* 1 34*   CALCIUM mg/dL 8 3 8 3   GLUCOSE RANDOM mg/dL 126 114   ALT U/L 20 26   AST U/L 20 20   ALK PHOS U/L 86 101   ALBUMIN g/dL 2 9* 3 3* TOTAL BILIRUBIN mg/dL 0 53 0 20     Results from last 7 days   Lab Units 01/17/21  1003   MAGNESIUM mg/dL 2 4      Results from last 7 days   Lab Units 01/17/21  1003   INR  1 04   PTT seconds 33      Results from last 7 days   Lab Units 01/17/21  1003   TROPONIN I ng/mL 0 02     Results from last 7 days   Lab Units 01/17/21  1003   LACTIC ACID mmol/L 0 4*     ABG:  Results from last 7 days   Lab Units 01/17/21  1137   PH ART  7 343*   PCO2 ART mm Hg 50 5*   PO2 ART mm Hg 280 0*   HCO3 ART mmol/L 26 8   BASE EXC ART mmol/L 0 5   ABG SOURCE  Radial, Left     VBG:  Results from last 7 days   Lab Units 01/17/21  1137 01/17/21  1003   PH IVONNE   --  7 113*   PCO2 IVONNE mm Hg  --  101 1*   PO2 IVONNE mm Hg  --  103 9*   HCO3 IVONNE mmol/L  --  31 6*   BASE EXC IVONNE mmol/L  --  -0 7   ABG SOURCE  Radial, Left  --      Results from last 7 days   Lab Units 01/18/21  0421 01/17/21  1003   PROCALCITONIN ng/ml 0 16 0 17       Micro  Results from last 7 days   Lab Units 01/17/21  1715 01/17/21  1003   BLOOD CULTURE  No Growth at 24 hrs  No Growth at 24 hrs  GRAM STAIN RESULT  Gram positive cocci in pairs and chains* Gram positive cocci in clusters*       EKG:  Sinus rhythm on telemetry  Imaging: I have personally reviewed pertinent reports  and I have personally reviewed pertinent films in PACS   1/18 CXR: improved vascular congestion/ground-glass opacities    Intake and Output  I/O       01/17 0701 - 01/18 0700 01/18 0701 - 01/19 0700    I V  (mL/kg) 310 1 (1 8) 221 7 (1 3)    Blood 204     IV Piggyback  50    Total Intake(mL/kg) 514 1 (3) 271 7 (1 6)    Urine (mL/kg/hr) 480 410 (0 1)    Emesis/NG output 0     Total Output 480 410    Net +34 1 -138 3                Height and Weights   Height: 5' 11" (180 3 cm)  IBW: 75 3 kg  Body mass index is 52 03 kg/m²    Weight (last 2 days)     Date/Time   Weight    01/18/21 0538   (!) 169 (373 02)    01/17/21 1650   (!) 168 (091 98)                Nutrition       Diet Orders   (From admission, onward)             Start     Ordered    01/18/21 1737  Diet Regular; Regular House  Diet effective now     Question Answer Comment   Diet Type Regular    Regular Regular House    RD to adjust diet per protocol?  Yes        01/18/21 1736    01/17/21 1719  Room Service  Once     Question:  Type of Service  Answer:  Room Service- Not Appropriate    01/17/21 1718                  Active Medications  Scheduled Meds:  Current Facility-Administered Medications   Medication Dose Route Frequency Provider Last Rate    ascorbic acid  1,000 mg Per NG Tube Q12H [facility] KATHY Rivas      aspirin  81 mg Oral Daily KATHY Rivas      atorvastatin  40 mg Per NG Tube HS KATHY Rivas      bisacodyl  10 mg Rectal Daily PRN KATHY Rivas      bumetanide  2 mg Oral Daily KATHY Rivas      chlorhexidine  15 mL Swish & Spit Q12H [facility] Angie Pierre, Manish [address]      cholecalciferol  2,000 Units Per NG Tube Daily Manish Rivas [address]      dexamethasone  0 1 mg/kg Intravenous Q12H KATHY Rivas Stopped (01/18/21 1900)    enoxaparin  60 mg Subcutaneous Q12H KATHY Rivas      zinc sulfate  220 mg Per NG Tube Daily KATHY Rivas      Followed by   Patrizia Garsia ON 1/25/2021] multivitamin with iron-minerals  15 mL Per NG Tube Daily KATHY Rivas      pantoprazole  40 mg Oral Early Morning KATHY Rivas       Continuous Infusions:     PRN Meds:   bisacodyl, 10 mg, Daily PRN        Invasive Devices Review  Invasive Devices     Central Venous Catheter Line            CVC Central Lines 01/17/21 Triple Left Femoral 1 day          Peripheral Intravenous Line            Peripheral IV 01/17/21 Left Antecubital 1 day    Peripheral IV 01/17/21 Right Hand 1 day          Drain            Urethral Catheter Straight-tip 16 Fr  1 day                Rationale for remaining devices: n/a  ---------------------------------------------------------------------------------------  Advance Directive and Living Will:      Power of :    POLST:    ---------------------------------------------------------------------------------------  Care Time Delivered:   No Critical Care time spent       KATHY Merritt      Portions of the record may have been created with voice recognition software  Occasional wrong word or "sound a like" substitutions may have occurred due to the inherent limitations of voice recognition software    Read the chart carefully and recognize, using context, where substitutions have occurred

## 2021-01-19 NOTE — QUICK NOTE
Call placed to patients wife Real Michael to provide update  Discussed plan of  and plan to downgrade to med surg  All questions answered, Real Michael in agreement with plan of care

## 2021-01-19 NOTE — ASSESSMENT & PLAN NOTE
· Residual Left-sided weakness  · PT/OT  · Passed bedside swallow, started on diet  · Continue statin  · Restart aspirin

## 2021-01-19 NOTE — ASSESSMENT & PLAN NOTE
· Confirmed positive 1/17  · Encourage self-proning / Lateral decubitus positioning   · CXR 1/18: improved vascular congestion    MEDICATIONS:  · Decadron 0 1 mg/kg day 3/10  · VC/Zinc day 3/7 to be followed by MVI  · VD, atorvastatin, PPI  · Lovenox ppx  · Received convalescent plasma 1/17  · Not candidate for remdesivir due to severe pathway

## 2021-01-19 NOTE — MALNUTRITION/BMI
This medical record reflects one or more clinical indicators suggestive of malnutrition and/or morbid obesity  BMI Findings:  Adult BMI Classifications: Morbid Obesity 50-59 9     Body mass index is 52 03 kg/m²  See Nutrition note dated 1/19/21 for additional details  Completed nutrition assessment is viewable in the nutrition documentation

## 2021-01-19 NOTE — ASSESSMENT & PLAN NOTE
· Secondary to COVID-19 pneumonia  · Extubated 1/18/21  · Currently on NC 2 L  · Titrate FiO2 for SpO2 > 90%  · COVID treatment as below  · Respiratory protocol  · Pulmonary toilet  · Incentive spirometer

## 2021-01-20 ENCOUNTER — APPOINTMENT (INPATIENT)
Dept: NON INVASIVE DIAGNOSTICS | Facility: HOSPITAL | Age: 61
DRG: 130 | End: 2021-01-20
Payer: COMMERCIAL

## 2021-01-20 LAB
ALBUMIN SERPL BCP-MCNC: 2.7 G/DL (ref 3.5–5)
ALP SERPL-CCNC: 69 U/L (ref 46–116)
ALT SERPL W P-5'-P-CCNC: 28 U/L (ref 12–78)
ANION GAP SERPL CALCULATED.3IONS-SCNC: 7 MMOL/L (ref 4–13)
AST SERPL W P-5'-P-CCNC: 21 U/L (ref 5–45)
BASOPHILS # BLD AUTO: 0 THOUSANDS/ΜL (ref 0–0.1)
BASOPHILS NFR BLD AUTO: 0 % (ref 0–1)
BILIRUB SERPL-MCNC: 0.3 MG/DL (ref 0.2–1)
BUN SERPL-MCNC: 50 MG/DL (ref 5–25)
CALCIUM ALBUM COR SERPL-MCNC: 9.1 MG/DL (ref 8.3–10.1)
CALCIUM SERPL-MCNC: 8.1 MG/DL (ref 8.3–10.1)
CHLORIDE SERPL-SCNC: 104 MMOL/L (ref 100–108)
CO2 SERPL-SCNC: 24 MMOL/L (ref 21–32)
CREAT SERPL-MCNC: 1.76 MG/DL (ref 0.6–1.3)
CRP SERPL QL: 8.1 MG/L
D DIMER PPP FEU-MCNC: 0.81 UG/ML FEU
EOSINOPHIL # BLD AUTO: 0 THOUSAND/ΜL (ref 0–0.61)
EOSINOPHIL NFR BLD AUTO: 0 % (ref 0–6)
ERYTHROCYTE [DISTWIDTH] IN BLOOD BY AUTOMATED COUNT: 17.4 % (ref 11.6–15.1)
FERRITIN SERPL-MCNC: 28 NG/ML (ref 8–388)
GFR SERPL CREATININE-BSD FRML MDRD: 41 ML/MIN/1.73SQ M
GLUCOSE SERPL-MCNC: 136 MG/DL (ref 65–140)
GLUCOSE SERPL-MCNC: 151 MG/DL (ref 65–140)
HCT VFR BLD AUTO: 39.5 % (ref 36.5–49.3)
HGB BLD-MCNC: 11.1 G/DL (ref 12–17)
IMM GRANULOCYTES # BLD AUTO: 0.02 THOUSAND/UL (ref 0–0.2)
IMM GRANULOCYTES NFR BLD AUTO: 0 % (ref 0–2)
LYMPHOCYTES # BLD AUTO: 0.18 THOUSANDS/ΜL (ref 0.6–4.47)
LYMPHOCYTES NFR BLD AUTO: 4 % (ref 14–44)
MAGNESIUM SERPL-MCNC: 2.5 MG/DL (ref 1.6–2.6)
MCH RBC QN AUTO: 22.7 PG (ref 26.8–34.3)
MCHC RBC AUTO-ENTMCNC: 28.1 G/DL (ref 31.4–37.4)
MCV RBC AUTO: 81 FL (ref 82–98)
MONOCYTES # BLD AUTO: 0.35 THOUSAND/ΜL (ref 0.17–1.22)
MONOCYTES NFR BLD AUTO: 7 % (ref 4–12)
NEUTROPHILS # BLD AUTO: 4.24 THOUSANDS/ΜL (ref 1.85–7.62)
NEUTS SEG NFR BLD AUTO: 89 % (ref 43–75)
NRBC BLD AUTO-RTO: 0 /100 WBCS
PLATELET # BLD AUTO: 148 THOUSANDS/UL (ref 149–390)
PMV BLD AUTO: 10.9 FL (ref 8.9–12.7)
POTASSIUM SERPL-SCNC: 3.7 MMOL/L (ref 3.5–5.3)
PROCALCITONIN SERPL-MCNC: 0.09 NG/ML
PROT SERPL-MCNC: 6.4 G/DL (ref 6.4–8.2)
RBC # BLD AUTO: 4.88 MILLION/UL (ref 3.88–5.62)
SODIUM SERPL-SCNC: 135 MMOL/L (ref 136–145)
WBC # BLD AUTO: 4.79 THOUSAND/UL (ref 4.31–10.16)

## 2021-01-20 PROCEDURE — 93325 DOPPLER ECHO COLOR FLOW MAPG: CPT | Performed by: INTERNAL MEDICINE

## 2021-01-20 PROCEDURE — 85025 COMPLETE CBC W/AUTO DIFF WBC: CPT | Performed by: PHYSICIAN ASSISTANT

## 2021-01-20 PROCEDURE — 93321 DOPPLER ECHO F-UP/LMTD STD: CPT | Performed by: INTERNAL MEDICINE

## 2021-01-20 PROCEDURE — 83735 ASSAY OF MAGNESIUM: CPT | Performed by: PHYSICIAN ASSISTANT

## 2021-01-20 PROCEDURE — 80053 COMPREHEN METABOLIC PANEL: CPT | Performed by: PHYSICIAN ASSISTANT

## 2021-01-20 PROCEDURE — 93308 TTE F-UP OR LMTD: CPT | Performed by: INTERNAL MEDICINE

## 2021-01-20 PROCEDURE — 82728 ASSAY OF FERRITIN: CPT | Performed by: PHYSICIAN ASSISTANT

## 2021-01-20 PROCEDURE — 93308 TTE F-UP OR LMTD: CPT

## 2021-01-20 PROCEDURE — 99255 IP/OBS CONSLTJ NEW/EST HI 80: CPT | Performed by: INTERNAL MEDICINE

## 2021-01-20 PROCEDURE — 86140 C-REACTIVE PROTEIN: CPT | Performed by: PHYSICIAN ASSISTANT

## 2021-01-20 PROCEDURE — 85379 FIBRIN DEGRADATION QUANT: CPT | Performed by: PHYSICIAN ASSISTANT

## 2021-01-20 PROCEDURE — 99232 SBSQ HOSP IP/OBS MODERATE 35: CPT | Performed by: INTERNAL MEDICINE

## 2021-01-20 PROCEDURE — 84145 PROCALCITONIN (PCT): CPT | Performed by: PHYSICIAN ASSISTANT

## 2021-01-20 PROCEDURE — 82948 REAGENT STRIP/BLOOD GLUCOSE: CPT

## 2021-01-20 RX ADMIN — PANTOPRAZOLE SODIUM 40 MG: 40 TABLET, DELAYED RELEASE ORAL at 05:39

## 2021-01-20 RX ADMIN — ZINC SULFATE 220 MG (50 MG) CAPSULE 220 MG: CAPSULE at 09:41

## 2021-01-20 RX ADMIN — CHLORHEXIDINE GLUCONATE 0.12% ORAL RINSE 15 ML: 1.2 LIQUID ORAL at 09:41

## 2021-01-20 RX ADMIN — VANCOMYCIN HYDROCHLORIDE 1750 MG: 1 INJECTION, POWDER, LYOPHILIZED, FOR SOLUTION INTRAVENOUS at 09:42

## 2021-01-20 RX ADMIN — DEXAMETHASONE SODIUM PHOSPHATE 15.6 MG: 10 INJECTION, SOLUTION INTRAMUSCULAR; INTRAVENOUS at 19:07

## 2021-01-20 RX ADMIN — BUMETANIDE 2 MG: 1 TABLET ORAL at 09:41

## 2021-01-20 RX ADMIN — ATORVASTATIN CALCIUM 40 MG: 40 TABLET, FILM COATED ORAL at 21:30

## 2021-01-20 RX ADMIN — OXYCODONE HYDROCHLORIDE AND ACETAMINOPHEN 1000 MG: 500 TABLET ORAL at 21:29

## 2021-01-20 RX ADMIN — Medication 2000 UNITS: at 09:41

## 2021-01-20 RX ADMIN — METOPROLOL TARTRATE 50 MG: 50 TABLET, FILM COATED ORAL at 19:04

## 2021-01-20 RX ADMIN — ENOXAPARIN SODIUM 60 MG: 60 INJECTION SUBCUTANEOUS at 19:04

## 2021-01-20 RX ADMIN — DEXAMETHASONE SODIUM PHOSPHATE 15.6 MG: 10 INJECTION, SOLUTION INTRAMUSCULAR; INTRAVENOUS at 05:59

## 2021-01-20 RX ADMIN — VANCOMYCIN HYDROCHLORIDE 1750 MG: 1 INJECTION, POWDER, LYOPHILIZED, FOR SOLUTION INTRAVENOUS at 21:30

## 2021-01-20 RX ADMIN — ASPIRIN 81 MG: 81 TABLET ORAL at 09:41

## 2021-01-20 RX ADMIN — OXYCODONE HYDROCHLORIDE AND ACETAMINOPHEN 1000 MG: 500 TABLET ORAL at 09:41

## 2021-01-20 RX ADMIN — METOPROLOL TARTRATE 50 MG: 50 TABLET, FILM COATED ORAL at 09:41

## 2021-01-20 RX ADMIN — ENOXAPARIN SODIUM 60 MG: 60 INJECTION SUBCUTANEOUS at 05:39

## 2021-01-20 NOTE — PLAN OF CARE
Problem: Prexisting or High Potential for Compromised Skin Integrity  Goal: Skin integrity is maintained or improved  Description: INTERVENTIONS:  - Identify patients at risk for skin breakdown  - Assess and monitor skin integrity  - Assess and monitor nutrition and hydration status  - Monitor labs   - Assess for incontinence   - Turn and reposition patient  - Assist with mobility/ambulation  - Relieve pressure over bony prominences  - Avoid friction and shearing  - Provide appropriate hygiene as needed including keeping skin clean and dry  - Evaluate need for skin moisturizer/barrier cream  - Collaborate with interdisciplinary team   - Patient/family teaching  - Consider wound care consult   Outcome: Progressing     Problem: Potential for Falls  Goal: Patient will remain free of falls  Description: INTERVENTIONS:  - Assess patient frequently for physical needs  -  Identify cognitive and physical deficits and behaviors that affect risk of falls    -  [place] fall precautions as indicated by assessment   - Educate patient/family on patient safety including physical limitations  - Instruct patient to call for assistance with activity based on assessment  - Modify environment to reduce risk of injury  - Consider OT/PT consult to assist with strengthening/mobility  Outcome: Progressing     Problem: SAFETY,RESTRAINT: NV/NON-SELF DESTRUCTIVE BEHAVIOR  Goal: Remains free of harm/injury (restraint for non violent/non self-detsructive behavior)  Description: INTERVENTIONS:  - Instruct patient/family regarding restraint use   - Assess and monitor physiologic and psychological status   - Provide interventions and comfort measures to meet assessed patient needs   - Identify and implement measures to help patient regain control  - Assess readiness for release of restraint   Outcome: Progressing  Goal: Returns to optimal restraint-free functioning  Description: INTERVENTIONS:  - Assess the patient's behavior and symptoms that indicate continued need for restraint  - Identify and implement measures to help patient regain control  - Assess readiness for release of restraint   Outcome: Progressing     Problem: Nutrition/Hydration-ADULT  Goal: Nutrient/Hydration intake appropriate for improving, restoring or maintaining nutritional needs  Description: Monitor and assess patient's nutrition/hydration status for malnutrition  Collaborate with interdisciplinary team and initiate plan and interventions as ordered  Monitor patient's weight and dietary intake as ordered or per policy  Utilize nutrition screening tool and intervene as necessary  Determine patient's food preferences and provide high-protein, high-caloric foods as appropriate       INTERVENTIONS:  - Monitor oral intake, urinary output, labs, and treatment plans  - Assess nutrition and hydration status and recommend course of action  - Evaluate amount of meals eaten  - Assist patient with eating if necessary   - Allow adequate time for meals  - Recommend/ encourage appropriate diets, oral nutritional supplements, and vitamin/mineral supplements  - Order, calculate, and assess calorie counts as needed  - Recommend, monitor, and adjust tube feedings and TPN/PPN based on assessed needs  - Assess need for intravenous fluids  - Provide specific nutrition/hydration education as appropriate  - Include patient/family/caregiver in decisions related to nutrition  Outcome: Progressing

## 2021-01-20 NOTE — PHYSICAL THERAPY NOTE
PHYSICAL THERAPY CANCELLATION NOTE    Patient Name: Adrien Pacheco  [id number] Date: 1/20/2021 01/20/21 1451   PT Last Visit   PT Visit Date 01/20/21   Note Type   Note type Screen   Activity Tolerance   Medical Staff Made Aware Spoke to MARCIA Brady   Assessment   Assessment PT orders received, chart review performed  Per MARCIA Medina, pt is a LTC resident at [facility], where he is bedbound  Plan for pt to return to LTC upon DC  Pt w/ no acute PT needs, will DC from IPPT caseload          Cammie Phillips, PT, DPT

## 2021-01-20 NOTE — CASE MANAGEMENT
LOS: 3 days  Bundle: No  Readmission: No     Unplanned Readmission Score: 17 - Green    CM s/w patient's wife Sydney Lucio via telephone to complete initial assessment and discuss dcp  Per Thena Khadijah, patient is been a resident at [facility] for 2 years  Patient is bed bound, continent of bowel and bladder, and can feed himself  A post acute care recommendation was made by your care team for LTC  Discussed Freedom of Choice with POA  Choice is to return/continue services at [facility] upon d/c       CM reviewed discharge planning process including the following: identifying caregivers at home, preference for d/c planning needs, availability of treatment team to discuss questions or concerns patient and/or family may have regarding diagnosis, plan of care, old or new medications and discharge planning   CM will continue to follow for care coordination and update assessment as appropriate

## 2021-01-20 NOTE — ASSESSMENT & PLAN NOTE
Secondary to covid 19 pneumonia also with bacteremia  ID are following for bacteremia  Received severe treatment including conv plasma

## 2021-01-20 NOTE — PROGRESS NOTES
Progress Note - Lay Catherine 1960, 63 y o  male MRN: [id number]    Unit/Bed#: S -01 Encounter: [id number]    Primary Care Provider: Jose Guadalupe Pelayo DO   Date and time admitted to hospital: 2021  4:16 PM        Acute respiratory failure with hypoxia and hypercarbia [facility])  Assessment & Plan  Secondary to covid 19 pneumonia also with bacteremia  ID are following for bacteremia  Received severe treatment including conv plasma   Atrial fibrillation ([facility] [address] )  Assessment & Plan  Rate controlled  Essential hypertension  Assessment & Plan  BP is 127/74        VTE Pharmacologic Prophylaxis:   Pharmacologic: Heparin  Mechanical VTE Prophylaxis in Place: Yes    Patient Centered Rounds: I have performed bedside rounds with nursing staff today  Discussions with Specialists or Other Care Team Provider: Discussed with nursing and with CM     Education and Discussions with Family / Patient: Discussed with patient  Time Spent for Care: 30 minutes  More than 50% of total time spent on counseling and coordination of care as described above  Current Length of Stay: 3 day(s)    Current Patient Status: Inpatient   Certification Statement: The patient will continue to require additional inpatient hospital stay due to wean off O2  Discharge Plan: unclear  Code Status: Level 1 - Full Code      Subjective:   Patient seen and examined  Feeling "okay"  Says the mattress is "anton"    Objective:     Vitals:   Temp (24hrs), Av °F (36 7 °C), Min:98 °F (36 7 °C), Max:98 1 °F (36 7 °C)    Temp:  [98 °F (36 7 °C)-98 1 °F (36 7 °C)] 98 °F (36 7 °C)  HR:  [63-68] 68  Resp:  [18-20] 18  BP: (116-130)/(53-74) 127/74  SpO2:  [95 %-96 %] 96 %  Body mass index is 56 15 kg/m²  Input and Output Summary (last 24 hours):        Intake/Output Summary (Last 24 hours) at 2021 1707  Last data filed at 2021 0601  Gross per 24 hour   Intake 600 ml   Output 700 ml   Net -100 ml       Physical Exam: Physical Exam  Constitutional:       General: He is not in acute distress  Appearance: He is not ill-appearing, toxic-appearing or diaphoretic  HENT:      Head: Normocephalic  Nose: Nose normal       Mouth/Throat:      Mouth: Mucous membranes are moist    Eyes:      General: No scleral icterus  Right eye: No discharge  Left eye: No discharge  Pupils: Pupils are equal, round, and reactive to light  Cardiovascular:      Heart sounds: Murmur present  Pulmonary:      Effort: Respiratory distress (mild  ) present  Abdominal:      General: There is no distension  Tenderness: There is no abdominal tenderness  Musculoskeletal:         General: No tenderness or deformity  Right lower leg: No edema  Left lower leg: No edema  Skin:     Findings: No lesion  Neurological:      General: No focal deficit present  Mental Status: He is alert  Cranial Nerves: No cranial nerve deficit  Sensory: No sensory deficit  Motor: No weakness        Coordination: Coordination normal       Gait: Gait normal       Deep Tendon Reflexes: Reflexes normal    Psychiatric:         Mood and Affect: Mood normal            Additional Data:     Labs:    Results from last 7 days   Lab Units 01/20/21  0639   WBC Thousand/uL 4 79   HEMOGLOBIN g/dL 11 1*   HEMATOCRIT % 39 5   PLATELETS Thousands/uL 148*   NEUTROS PCT % 89*   LYMPHS PCT % 4*   MONOS PCT % 7   EOS PCT % 0     Results from last 7 days   Lab Units 01/20/21  0639   SODIUM mmol/L 135*   POTASSIUM mmol/L 3 7   CHLORIDE mmol/L 104   CO2 mmol/L 24   BUN mg/dL 50*   CREATININE mg/dL 1 76*   ANION GAP mmol/L 7   CALCIUM mg/dL 8 1*   ALBUMIN g/dL 2 7*   TOTAL BILIRUBIN mg/dL 0 30   ALK PHOS U/L 69   ALT U/L 28   AST U/L 21   GLUCOSE RANDOM mg/dL 136     Results from last 7 days   Lab Units 01/17/21  1003   INR  1 04     Results from last 7 days   Lab Units 01/20/21  0006 01/19/21  1736 01/19/21  1205 01/18/21  1224 01/18/21  0548 01/18/21  0149 01/17/21  0959   POC GLUCOSE mg/dl 151* 159* 151* 124 120 119 107         Results from last 7 days   Lab Units 01/20/21  0639 01/19/21  0512 01/18/21  0421 01/17/21  1003   LACTIC ACID mmol/L  --   --   --  0 4*   PROCALCITONIN ng/ml 0 09 0 15 0 16 0 17           * I Have Reviewed All Lab Data Listed Above  * Additional Pertinent Lab Tests Reviewed: [address] 65 Admission Reviewed    Imaging:    Imaging Reports Reviewed Today Include:   None   Imaging Personally Reviewed by Myself Includes:  As above  Recent Cultures (last 7 days):     Results from last 7 days   Lab Units 01/19/21  1140 01/17/21  1715 01/17/21  1003   BLOOD CULTURE  No Growth at 24 hrs  No Growth at 24 hrs  Enterococcus faecalis*  Staphylococcus coagulase negative*  No Growth at 48 hrs  No Growth at 72 hrs    Staphylococcus coagulase negative*  Staphylococcus coagulase negative*   GRAM STAIN RESULT   --  Gram positive cocci in pairs and chains* Gram positive cocci in clusters*       Last 24 Hours Medication List:   Current Facility-Administered Medications   Medication Dose Route Frequency Provider Last Rate    ascorbic acid  1,000 mg Per NG Tube Q12H [address] Yareli Haynes PA-C      aspirin  81 mg Oral Daily Yareli Haynes PA-C      atorvastatin  40 mg Per NG Tube HS Yareli Haynes PA-C      bisacodyl  10 mg Rectal Daily PRN Yareli Haynes PA-C      bumetanide  2 mg Oral Daily Yareli Haynes PA-C      chlorhexidine  15 mL Swish & Spit Q12H [address] Yareli Haynes PA-C      cholecalciferol  2,000 Units Per NG Tube Daily Yareli Haynes PA-C      dexamethasone  0 1 mg/kg Intravenous Q12H Yareli Haynes PA-C 15 6 mg (01/20/21 0559)    enoxaparin  60 mg Subcutaneous Q12H Yareli Haynes PA-C      metoprolol tartrate  50 mg Oral TID Yareli Haynes PA-C      zinc sulfate  220 mg Per NG Tube Daily Yareli Haynes PA-C      Followed by  [START ON 1/25/2021] multivitamin with iron-minerals  15 mL Per NG Tube Daily Yareli Haynes PA-C      pantoprazole  40 mg Oral Early Morning Yareli Haynes PA-C      vancomycin  15 mg/kg (Adjusted) Intravenous Q12H Yareli Haynes PA-C 1,750 mg (01/20/21 6039)        Today, Patient Was Seen By: Taiwo Ford MD    ** Please Note: Dictation voice to text software may have been used in the creation of this document   **

## 2021-01-20 NOTE — OCCUPATIONAL THERAPY NOTE
Occupational Therapy Evaluation     Patient Name: Seda Camejo  JSLOJ'L Date: 1/20/2021  Problem List  Principal Problem:    Pneumonia due to COVID-19 virus  Active Problems: Aortic stenosis, mild    Essential hypertension    Hyperlipidemia    CVA (cerebral vascular accident) ([facility] [address] )    Acute metabolic encephalopathy    Atrial fibrillation ([facility] [address] )    Acute respiratory failure with hypoxia and hypercarbia [facility])    Past Medical History  Past Medical History:   Diagnosis Date    Allergic rhinitis     last assessed 9/12/12    Finger fracture, right     Closed fx of the middle phalanx of the right 5th finger  last assessed 1/30/14    GI bleed 2/22/2019    Hemorrhoids     last assessed 2/10/14    Hyperlipidemia     Hypertension     Stroke [facility])      Past Surgical History  Past Surgical History:   Procedure Laterality Date    AORTIC VALVE REPLACEMENT  07/30/2014    AVR with 25mm [facility] Ease bovine pericardial valve    CARDIAC CATHETERIZATION  07/18/2014    SLB left main-normal, circumflex-normal, ramus intermedius-normal, RCA was large and dominant giving rise to the PDA & a large posterolateral branch  No disease  last assessed 8/19/14     COLONOSCOPY N/A 2/25/2019    Procedure: COLONOSCOPY;  Surgeon: Lin Cavazos DO;  [facility]; Service: Gastroenterology    ESOPHAGOGASTRODUODENOSCOPY N/A 2/22/2019    Procedure: ESOPHAGOGASTRODUODENOSCOPY (EGD)-roadshow overnight;  Surgeon: Lin Cavazos DO;  [facility]; Service: Gastroenterology    ESOPHAGOGASTRODUODENOSCOPY N/A 2/23/2019    Procedure: ESOPHAGOGASTRODUODENOSCOPY (EGD); Surgeon: Shanell Hernandez MD;  [facility]; Service: Gastroenterology    ESOPHAGOGASTRODUODENOSCOPY N/A 2/25/2019    Procedure: ESOPHAGOGASTRODUODENOSCOPY (EGD); Surgeon: Lin Cavazos DO;  [facility];   Service: Gastroenterology    IR NON-TUNNELED CENTRAL LINE PLACEMENT  3/1/2019    IR NON-TUNNELED CENTRAL LINE PLACEMENT 2/4/2019    IR TUNNELED DIALYSIS CATHETER CHECK/CHANGE/REPOSITION/ANGIOPLASTY  4/18/2019    IR TUNNELED DIALYSIS CATHETER PLACEMENT  2/4/2019    IR TUNNELED DIALYSIS CATHETER PLACEMENT  2/18/2019    KNEE ARTHROSCOPY      KNEE CARTILAGE SURGERY Left     medial meniscus tear     TESTICLE SURGERY      TONSILLECTOMY AND ADENOIDECTOMY      TOOTH EXTRACTION          01/20/21 1450   OT Last Visit   OT Visit Date 01/20/21  (Wednesday)   Note Type   Note type Screen   Activity Tolerance   Medical Staff Made Aware spoke to Julia HERRERA   Assessment   Assessment OT orders received and chart review completed  Per CM note patient is been a resident at [facility] for 2 years  Patient is bed bound, continent of bowel and bladder, and can feed himself  Recommend active participation in ADL w/ nursing staff as able in acute care  No acute OT needs at this time  Please re -consult if needs arise   D/C OT      Vero Ortiz OTR/L

## 2021-01-20 NOTE — PROGRESS NOTES
Vancomycin IV Pharmacy-to-Dose Consultation    Adrien Pacheco is a 63 y o  male who is currently receiving Vancomycin IV with management by the Pharmacy Consult service  Assessment/Plan:  The patient was reviewed  Renal function is stable and no signs or symptoms of nephrotoxicity and/or infusion reactions were documented in the chart  Based on todays assessment, continue current vancomycin (day # 2) dosing of 1750 mg every 12 hours, with a plan for trough to be drawn at 0630 on 1/21  We will continue to follow the patients culture results and clinical progress daily      Khalida Johnson, Pharmacist

## 2021-01-20 NOTE — CONSULTS
Consultation - Infectious Disease   Gin Quinones 63 y o  male MRN: [id number]  Unit/Bed#: S -01 Encounter: [id number]      IMPRESSION & RECOMMENDATIONS:     1  Mixed Gram Positive Blood cultures  Admission blood cultures were drawn on the 17th at [facility] with 1 of 2 sets growing 2 colony types of coag-negative staph  Repeat blood cultures were drawn on arrival to [facility] and 1 of 2 blood cultures here are growing Enterococcus faecalis and coag-negative staph  Unclear source  Patient has AVR 2014  TTE negative for vegetation  Rec:  · Continue IV vancomycin pending final culture results  · Follow-up pending culture susceptibilities  · Follow-up repeat blood cultures  · Pharmacy on consult for vancomycin trough management  · Next vancomycin trough scheduled for 01/21/2020 at 6:30 a m   · Monitor temperature and hemodynamics  · Monitor CBC with diff  · Monitor BMP    2  Acute Respiratory Failure  Suspected hypoxic and hypercapnic related  S/p successful extubation  Appears to be multifactorial including pulmonary edema and seemingly mild COVID infection  Rec:  · Monitor respiratory status  · Monitor on O2 requirement    3  COVID-19 infection  Patient comfortably sleeping on 2 L nasal cannula O2 statting 96% on 2L  CT more consistent with pulmonary edema than ground-glass opacities  Inflammatory markers are low as below  Patient received 1st COVID vaccine on 01/08/2021  CRP 30 > 8  Ddimer 1 15 > 0 81  Ferritin 31 > 28  Rec:  · Continues mild aligorhythm with IV Decadron  · Continues Lipitor, vitamin-C vitamin-D and zinc  · Patient received convalescent plasma 01/17/2021  · No indication for further COVID specific therapy  4  PATRICK on CKD  Creatinine now 1 76  Patient had been on HD a year in 2019  Rec:  · Renal dose adjust antibiotics as needed  · Pharmacy on consult for vancomycin trough management  · Monitor creatinine  · Volume management per primary care team    5  Morbid obesity    BMI 56 15 Increased risk factor for infection  Rec:     6  AVR 2014 and s/p remote MSSA bacteremia s/p ceftaroline 6 week course through 3/10/19  Rec:  · Continue IV vancomycin pending final culture results  · Follow-up pending culture susceptibilities  · Follow-up repeat blood cultures    Antibiotics:  Vancomycin D3    Detailed review of medical records including all notes, imaging and labs  Above impression and plan discussed in detail with patient, RN, microbiologist and primary care team   Thank you for this consultation  We will follow along with you  HISTORY OF PRESENT ILLNESS:  Reason for Consult:  1  Bacteremia 2  AVR    HPI: Phil Horne is a 63 y o  male with morbid obesity, s/p AVR 7872, with complicated hospitalization January 2019 with septic and cardiogenic shock, respiratory and renal failure requiring dialysis for 1 year after hospitalization, MSSA bacteremia s/p ceftaroline 6 week course through 3/10/19, who presented to [address] [place] [address] Emergency Department on 1/17/21 with respiratory distress and altered mental status  Patient is a resident of [address] who tested positive for COVID-19 facility at within the last week and then also on the 17th in the ER  Patient received his first COVID vaccine on January 8th  Patient became progressively encephalopathic  On arrival to [address] [place] [address], he required intubation due to hypercapnia and hypoxia  After approximately 1 hour of intubation, patient became restless and propofol was started  He became hypotensive, requiring norepinephrine and was transferred to [facility]  Admission blood cultures were drawn on the 17th at [facility] with 1 of 2 sets growing 2 colony types of coag-negative staph  Vancomycin and cefepime have been started empirically  Repeat blood cultures were drawn on arrival to [facility] and 1 of 2 blood cultures here are growing Enterococcus faecalis and coag-negative staph    Infectious Diease consultation is now being requested regarding evaluation and management of bacteremia in setting of AVR  Patient is a questionable historian and very groggy on exam   He has been extubated and transferred out of the ICU  He is not overtly short of breath at rest on 2 L NC  He denies any nausea, vomiting, diarrhea  He has a Turner catheter in place  He denies any chest pain, increased shortness of breath or cough  REVIEW OF SYSTEMS:  As above in HPI, as well as,  A complete system-based review of systems is otherwise negative  PAST MEDICAL HISTORY:  Past Medical History:   Diagnosis Date    Allergic rhinitis     last assessed 9/12/12    Finger fracture, right     Closed fx of the middle phalanx of the right 5th finger  last assessed 1/30/14    GI bleed 2/22/2019    Hemorrhoids     last assessed 2/10/14    Hyperlipidemia     Hypertension     Stroke [facility])      Past Surgical History:   Procedure Laterality Date    AORTIC VALVE REPLACEMENT  07/30/2014    AVR with 25mm [facility] Ease bovine pericardial valve    CARDIAC CATHETERIZATION  07/18/2014    SLB left main-normal, circumflex-normal, ramus intermedius-normal, RCA was large and dominant giving rise to the PDA & a large posterolateral branch  No disease  last assessed 8/19/14     COLONOSCOPY N/A 2/25/2019    Procedure: COLONOSCOPY;  Surgeon: Romana Barth, DO;  [facility]; Service: Gastroenterology    ESOPHAGOGASTRODUODENOSCOPY N/A 2/22/2019    Procedure: ESOPHAGOGASTRODUODENOSCOPY (EGD)-roadshow overnight;  Surgeon: Romana Barth, DO;  [facility]; Service: Gastroenterology    ESOPHAGOGASTRODUODENOSCOPY N/A 2/23/2019    Procedure: ESOPHAGOGASTRODUODENOSCOPY (EGD); Surgeon: Aleah Luu MD;  [facility]; Service: Gastroenterology    ESOPHAGOGASTRODUODENOSCOPY N/A 2/25/2019    Procedure: ESOPHAGOGASTRODUODENOSCOPY (EGD); Surgeon: Romana Barth, DO;  [facility];   Service: Gastroenterology    IR NON-TUNNELED CENTRAL LINE PLACEMENT  3/1/2019    IR NON-TUNNELED CENTRAL LINE PLACEMENT  2019    IR TUNNELED DIALYSIS CATHETER CHECK/CHANGE/REPOSITION/ANGIOPLASTY  2019    IR TUNNELED DIALYSIS CATHETER PLACEMENT  2019    IR TUNNELED DIALYSIS CATHETER PLACEMENT  2019    KNEE ARTHROSCOPY      KNEE CARTILAGE SURGERY Left     medial meniscus tear     TESTICLE SURGERY      TONSILLECTOMY AND ADENOIDECTOMY      TOOTH EXTRACTION         FAMILY HISTORY:  Non-contributory    SOCIAL HISTORY:  Social History     Substance and Sexual Activity   Alcohol Use Never    Frequency: Never    Comment: ocassion /never drank alcohol per [employer]      Social History     Substance and Sexual Activity   Drug Use No     Social History     Tobacco Use   Smoking Status Never Smoker   Smokeless Tobacco Never Used       ALLERGIES:  Allergies   Allergen Reactions    Amiodarone      Developed Rash    Penicillins Rash     However, has subsequently tolerated Cefazolin and Cefepime       MEDICATIONS:  All current active medications have been reviewed      PHYSICAL EXAM:  Vitals:  Temp:  [98 °F (36 7 °C)-98 1 °F (36 7 °C)] 98 °F (36 7 °C)  HR:  [63-68] 68  Resp:  [18-20] 18  BP: (116-130)/(53-74) 127/74  SpO2:  [95 %-96 %] 96 %  Temp (24hrs), Av °F (36 7 °C), Min:98 °F (36 7 °C), Max:98 1 °F (36 7 °C)  Current: Temperature: 98 °F (36 7 °C)     Physical Exam:  General:  78-year-old male, very sluggish in bed, arousable to name, mumbles for answers to questions, morbidly obese, in no acute distress at rest on 2 L nasal cannula O2  Eyes:  Conjunctive clear with no hemorrhages or effusions  Oropharynx:  No ulcers, no lesions  Neck:  Supple, no lymphadenopathy  Lungs: Decreased breath sounds with scattered wheeze anteriorly and laterally  bilaterally, no accessory muscle use  Cardiac:  Decreased heart tones, regular rate and rhythm  Abdomen:  Soft, non-tender, protuberant, positive bowel sounds  Extremities:  No peripheral cyanosis, clubbing, or + LE edema, left arm IV antecub with loosening edges of dressing  : bo with clear urine in bag  Skin:  No rashes, no ulcers, dry leg and feet flaking skin  Neurological: Very sluggish, wiggles all four extremities spontaneously, sensation grossly intact    LABS, IMAGING, & OTHER STUDIES:  Lab Results:  I have personally reviewed pertinent labs  Results from last 7 days   Lab Units 01/20/21  0639 01/19/21  0512 01/18/21  0421 01/17/21  1003   POTASSIUM mmol/L 3 7 3 9 4 0 4 9   CHLORIDE mmol/L 104 104 105 104   CO2 mmol/L 24 27 24 33*   BUN mg/dL 50* 47* 36* 24   CREATININE mg/dL 1 76* 1 74* 1 50* 1 34*   EGFR ml/min/1 73sq m 41 41 50 57   CALCIUM mg/dL 8 1* 8 4 8 3 8 3   AST U/L 21  --  20 20   ALT U/L 28  --  20 26   ALK PHOS U/L 69  --  86 101     Results from last 7 days   Lab Units 01/20/21  0639 01/19/21  0512 01/18/21  0421   WBC Thousand/uL 4 79 6 54 5 22   HEMOGLOBIN g/dL 11 1* 11 5* 11 9*   PLATELETS Thousands/uL 148* 158 166     Results from last 7 days   Lab Units 01/19/21  1140 01/17/21  1715 01/17/21  1003   BLOOD CULTURE  No Growth at 24 hrs  No Growth at 24 hrs  Enterococcus faecalis*  Staphylococcus coagulase negative*  No Growth at 48 hrs  No Growth at 72 hrs  Staphylococcus coagulase negative*  Staphylococcus coagulase negative*   GRAM STAIN RESULT   --  Gram positive cocci in pairs and chains* Gram positive cocci in clusters*     Results from last 7 days   Lab Units 01/20/21  0639 01/19/21  0512 01/18/21  0421 01/17/21  1003   PROCALCITONIN ng/ml 0 09 0 15 0 16 0 17     Imaging Studies:   01/18/2021 Portable chest x-ray:  Mild pulmonary vascular congestion  01/17/2021 PE study with CT abdomen pelvis:  Pulmonary edema, right lower lobe endobronchial mucous plugging likely with atelectasis    No acute abdominal findings or bowel obstruction  01/17/2021 head CT:  No acute intracranial abnormality    EKG, Pathology, and Other Studies:   I have personally reviewed pertinent reports

## 2021-01-21 PROBLEM — R31.9 HEMATURIA: Status: ACTIVE | Noted: 2021-01-21

## 2021-01-21 PROBLEM — R78.81 BACTEREMIA: Status: ACTIVE | Noted: 2021-01-21

## 2021-01-21 PROBLEM — N18.9 CHRONIC KIDNEY DISEASE: Status: ACTIVE | Noted: 2021-01-21

## 2021-01-21 LAB
BACTERIA BLD CULT: ABNORMAL
GRAM STN SPEC: ABNORMAL
GRAM STN SPEC: ABNORMAL
PROCALCITONIN SERPL-MCNC: 0.11 NG/ML
VANCOMYCIN SERPL-MCNC: 48.8 UG/ML
VANCOMYCIN TROUGH SERPL-MCNC: 50.6 UG/ML (ref 10–20)

## 2021-01-21 PROCEDURE — 99232 SBSQ HOSP IP/OBS MODERATE 35: CPT | Performed by: PHYSICIAN ASSISTANT

## 2021-01-21 PROCEDURE — 80202 ASSAY OF VANCOMYCIN: CPT | Performed by: INTERNAL MEDICINE

## 2021-01-21 PROCEDURE — 80202 ASSAY OF VANCOMYCIN: CPT | Performed by: PHYSICIAN ASSISTANT

## 2021-01-21 PROCEDURE — 84145 PROCALCITONIN (PCT): CPT | Performed by: PHYSICIAN ASSISTANT

## 2021-01-21 PROCEDURE — 99232 SBSQ HOSP IP/OBS MODERATE 35: CPT | Performed by: INTERNAL MEDICINE

## 2021-01-21 RX ORDER — DEXAMETHASONE SODIUM PHOSPHATE 4 MG/ML
6 INJECTION, SOLUTION INTRA-ARTICULAR; INTRALESIONAL; INTRAMUSCULAR; INTRAVENOUS; SOFT TISSUE DAILY
Status: DISCONTINUED | OUTPATIENT
Start: 2021-01-22 | End: 2021-02-01

## 2021-01-21 RX ORDER — ACETAMINOPHEN 325 MG/1
650 TABLET ORAL EVERY 6 HOURS PRN
Status: DISCONTINUED | OUTPATIENT
Start: 2021-01-21 | End: 2021-01-24

## 2021-01-21 RX ADMIN — METOPROLOL TARTRATE 50 MG: 50 TABLET, FILM COATED ORAL at 17:46

## 2021-01-21 RX ADMIN — ENOXAPARIN SODIUM 60 MG: 60 INJECTION SUBCUTANEOUS at 06:23

## 2021-01-21 RX ADMIN — CHLORHEXIDINE GLUCONATE 0.12% ORAL RINSE 15 ML: 1.2 LIQUID ORAL at 21:07

## 2021-01-21 RX ADMIN — ACETAMINOPHEN 650 MG: 325 TABLET, FILM COATED ORAL at 19:26

## 2021-01-21 RX ADMIN — OXYCODONE HYDROCHLORIDE AND ACETAMINOPHEN 1000 MG: 500 TABLET ORAL at 09:15

## 2021-01-21 RX ADMIN — VANCOMYCIN HYDROCHLORIDE 1750 MG: 1 INJECTION, POWDER, LYOPHILIZED, FOR SOLUTION INTRAVENOUS at 07:17

## 2021-01-21 RX ADMIN — ASPIRIN 81 MG: 81 TABLET ORAL at 09:15

## 2021-01-21 RX ADMIN — CHLORHEXIDINE GLUCONATE 0.12% ORAL RINSE 15 ML: 1.2 LIQUID ORAL at 09:15

## 2021-01-21 RX ADMIN — OXYCODONE HYDROCHLORIDE AND ACETAMINOPHEN 1000 MG: 500 TABLET ORAL at 21:07

## 2021-01-21 RX ADMIN — ATORVASTATIN CALCIUM 40 MG: 40 TABLET, FILM COATED ORAL at 21:08

## 2021-01-21 RX ADMIN — DEXAMETHASONE SODIUM PHOSPHATE 15.6 MG: 10 INJECTION, SOLUTION INTRAMUSCULAR; INTRAVENOUS at 06:22

## 2021-01-21 RX ADMIN — Medication 2000 UNITS: at 09:15

## 2021-01-21 RX ADMIN — ZINC SULFATE 220 MG (50 MG) CAPSULE 220 MG: CAPSULE at 09:15

## 2021-01-21 RX ADMIN — PANTOPRAZOLE SODIUM 40 MG: 40 TABLET, DELAYED RELEASE ORAL at 06:22

## 2021-01-21 NOTE — ASSESSMENT & PLAN NOTE
· Now normal sinus rhythm  · Rate controlled on Lopressor and Cardizem  · The Cardizem currently on hold due to low blood pressures  · Patient is on Lovenox 40 mg subcu daily at home, currently 1mg/kg b i d   Dosing

## 2021-01-21 NOTE — PROGRESS NOTES
Progress Note - Infectious Disease   Catha Stacie 63 y o  male MRN: [id number]  Unit/Bed#: S -01 Encounter: [id number]      Impression/Plan:  1  Mixed Gram Positive Blood cultures  Admission blood cultures were drawn on the 17th at [facility] with 1 of 2 sets growing 2 colony types of coag-negative staph  Repeat blood cultures were drawn on arrival to [facility] and 1 of 2 blood cultures here are growing Enterococcus faecalis and coag-negative staph  Blood culture contaminants highly suspected with 3 different colony types of CNS and 1 of Enterococcus in same set as CNS and known difficult IV/blood draw access  Patient has AVR 2014  TTE negative for vegetation  Vancomycin Trough 50 6  Rec:  · Continue IV vancomycin pending final repeat culture results  · If 1/19/21 repeat blood cultures negative at 72 hours, no further Vancomycin planned  · Pharmacy on consult for vancomycin trough management  · Monitor temperature and hemodynamics  · Monitor CBC with diff  · Monitor BMP     2  Acute Respiratory Failure  Suspected hypoxic and hypercapnic related  S/p successful extubation  Appears to be multifactorial including pulmonary edema and seemingly mild COVID infection  Rec:  · Monitor respiratory status  · Monitor on O2 requirement     3   COVID-19 infection  Patient comfortably sleeping on 2 L nasal cannula O2 statting 96% on 2L  CT more consistent with pulmonary edema than ground-glass opacities  Inflammatory markers are low as below  Patient received 1st COVID vaccine on 01/08/2021  CRP 30 > 8  Ddimer 1 15 > 0 81  Ferritin 31 > 28  Rec:  · Continues mild aligorhythm with IV Decadron  · Continues Lipitor, vitamin-C vitamin-D and zinc  · Patient received convalescent plasma 01/17/2021  · No indication for further COVID specific therapy      4  PATRICK on CKD  1/20 Creatinine 1 76    Patient had been on HD a year in 2019  Rec:  · Renal dose adjust antibiotics as needed  · Pharmacy on consult for vancomycin trough management  · Monitor creatinine  · Volume management per primary care team     5  Morbid obesity  BMI 56 15 Increased risk factor for infection  Rec:      6  AVR  and s/p remote MSSA bacteremia s/p ceftaroline 6 week course through 3/10/19  Rec:  · Follow-up repeat blood cultures  · Patient vancomycin loaded  No further vancomycin indicated if repeat blood culture remain negative at 72 hours      Antibiotics:  Vancomycin D3     Above impression and plan discussed in detail with patient, RN, microbiologist and primary care team     Subjective:  Patient is feeling "with his hands " He more awake and humorous today  He denies pain or SOB  ROS: Patient has no fever, chills, sweats; no nausea, vomiting, diarrhea; no cough, shortness of breath; no pain  No new symptoms  Objective:  Vitals:  Temp:  [98 °F (36 7 °C)-98 3 °F (36 8 °C)] 98 3 °F (36 8 °C)  HR:  [64-82] 82  Resp:  [18-24] 24  BP: ()/(52-74) 94/53  SpO2:  [95 %-97 %] 97 %  Temp (24hrs), Av 1 °F (36 7 °C), Min:98 °F (36 7 °C), Max:98 3 °F (36 8 °C)  Current: Temperature: 98 3 °F (36 8 °C)    General Appearance:  Awake, alert, cooperative, resting in bed, no acute distress on 2L NC O2 statting 97%   Throat: Oropharynx moist without lesions  Lungs:   Decreased breath sounds throughout with scattered faint expiratory wheeze  respirations unlabored with short conversation statements   Heart:  RRR; decreased tones   Abdomen:   Soft, non-tender, protuberant, positive bowel sounds  Extremities: + leg edema, left arm IV site nontender with loosening tape edges   Skin: No rashes, dry, fragile arm skin        Labs, Imaging, & Other studies:   All pertinent labs and imaging studies were personally reviewed  Results from last 7 days   Lab Units 21  0639 21  0512 21  0421   WBC Thousand/uL 4 79 6 54 5 22   HEMOGLOBIN g/dL 11 1* 11 5* 11 9*   PLATELETS Thousands/uL 148* 158 166     Results from last 7 days   Lab Units 01/20/21  0639  01/18/21  0421 01/17/21  1003   POTASSIUM mmol/L 3 7   < > 4 0 4 9   CHLORIDE mmol/L 104   < > 105 104   CO2 mmol/L 24   < > 24 33*   BUN mg/dL 50*   < > 36* 24   CREATININE mg/dL 1 76*   < > 1 50* 1 34*   EGFR ml/min/1 73sq m 41   < > 50 57   CALCIUM mg/dL 8 1*   < > 8 3 8 3   AST U/L 21  --  20 20   ALT U/L 28  --  20 26   ALK PHOS U/L 69  --  86 101    < > = values in this interval not displayed  Results from last 7 days   Lab Units 01/21/21  0533 01/20/21  0639 01/19/21  0512 01/18/21  0421 01/17/21  1003   PROCALCITONIN ng/ml 0 11 0 09 0 15 0 16 0 17     Results from last 7 days   Lab Units 01/19/21  1140 01/17/21  1715 01/17/21  1003   BLOOD CULTURE  No Growth at 24 hrs  No Growth at 24 hrs  Enterococcus faecalis*  Staphylococcus haemolyticus*  No Growth at 72 hrs  Staphylococcus hominis*  Staphylococcus epidermidis*  No Growth at 72 hrs     GRAM STAIN RESULT   --  Gram positive cocci in pairs and chains* Gram positive cocci in clusters*

## 2021-01-21 NOTE — ASSESSMENT & PLAN NOTE
· Patient presented with acute hypoxic and hypercapnic respiratory failure requiring intubation and mechanical ventilation secondary to COVID-19 pneumonia  · Patient extubated 1/18 and transferred out of ICU 1/20  · Now on 0-2 L

## 2021-01-21 NOTE — PROGRESS NOTES
Vancomycin Assessment    Kanu Johnson is a 63 y o  male who is currently receiving vancomycin 1750 mg IV q12h for MRSA suspected     Relevant clinical data and objective history reviewed:  Creatinine   Date Value Ref Range Status   01/20/2021 1 76 (H) 0 60 - 1 30 mg/dL Final     Comment:     Standardized to IDMS reference method   01/19/2021 1 74 (H) 0 60 - 1 30 mg/dL Final     Comment:     Standardized to IDMS reference method   01/18/2021 1 50 (H) 0 60 - 1 30 mg/dL Final     Comment:     Standardized to IDMS reference method   04/09/2015 0 67 0 60 - 1 30 mg/dL Final     Comment:     Standardized to IDMS reference method   11/15/2014 0 70 0 60 - 1 30 mg/dL Final     Comment:     Standardized to IDMS reference method   11/02/2014 0 86 0 60 - 1 30 mg/dL Final     Comment:     Standardized to IDMS reference method     Vancomycin Rm   Date Value Ref Range Status   03/04/2019 20 3 ug/mL Final     BP 94/53 (BP Location: Left arm)   Pulse 82   Temp 98 3 °F (36 8 °C) (Oral)   Resp (!) 24   Ht 5' 11" (1 803 m)   Wt (!) 183 kg (402 lb 9 oz)   SpO2 95%   BMI 56 15 kg/m²   I/O last 3 completed shifts: In: 1080 [P O :1080]  Out: 1750 [Urine:1750]  Lab Results   Component Value Date/Time    BUN 50 (H) 01/20/2021 06:39 AM    BUN 17 04/09/2015 11:41 AM    WBC 4 79 01/20/2021 06:39 AM    WBC 6 71 11/02/2014 05:56 AM    HGB 11 1 (L) 01/20/2021 06:39 AM    HGB 11 9 (L) 11/02/2014 05:56 AM    HCT 39 5 01/20/2021 06:39 AM    HCT 39 3 11/02/2014 05:56 AM    MCV 81 (L) 01/20/2021 06:39 AM    MCV 81 (L) 11/02/2014 05:56 AM     (L) 01/20/2021 06:39 AM     11/02/2014 05:56 AM     Temp Readings from Last 3 Encounters:   01/21/21 98 3 °F (36 8 °C) (Oral)   01/17/21 100 4 °F (38 °C)   05/24/19 99 9 °F (37 7 °C)     Vancomycin Days of Therapy: 3    Assessment/Plan  The patient is currently on vancomycin utilizing scheduled dosing    Baseline risks associated with therapy include: pre-existing renal impairment, concomitant nephrotoxic medications, and advanced age  The patient is receiving 1750 mg IV q12h with the most recent vancomycin level being not at steady-state and supratherapeutic based on a goal of 15-20 (appropriate for most indications) ; therefore, after clinical evaluation will be changed to to pulse dosing  Will check a random level in 12 hours  Vancomycin will be re dosed when level is less than 20  Pharmacy will continue to follow closely for s/sx of nephrotoxicity, infusion reactions, and appropriateness of therapy  BMP and CBC will be ordered per protocol  Pharmacy will continue to follow the patients culture results and clinical progress daily      Kj Estes, Pharmacist

## 2021-01-21 NOTE — ASSESSMENT & PLAN NOTE
· Hematuria noted today, chronic bo   · Prn bo flushes   · Change lovenox to daily dose per home reg

## 2021-01-21 NOTE — PLAN OF CARE
Problem: Prexisting or High Potential for Compromised Skin Integrity  Goal: Skin integrity is maintained or improved  Description: INTERVENTIONS:  - Identify patients at risk for skin breakdown  - Assess and monitor skin integrity  - Assess and monitor nutrition and hydration status  - Monitor labs   - Assess for incontinence   - Turn and reposition patient  - Assist with mobility/ambulation  - Relieve pressure over bony prominences  - Avoid friction and shearing  - Provide appropriate hygiene as needed including keeping skin clean and dry  - Evaluate need for skin moisturizer/barrier cream  - Collaborate with interdisciplinary team   - Patient/family teaching  - Consider wound care consult   Outcome: Progressing     Problem: Potential for Falls  Goal: Patient will remain free of falls  Description: INTERVENTIONS:  - Assess patient frequently for physical needs  -  Identify cognitive and physical deficits and behaviors that affect risk of falls    -  [place] fall precautions as indicated by assessment   - Educate patient/family on patient safety including physical limitations  - Instruct patient to call for assistance with activity based on assessment  - Modify environment to reduce risk of injury  - Consider OT/PT consult to assist with strengthening/mobility  Outcome: Progressing     Problem: SAFETY,RESTRAINT: NV/NON-SELF DESTRUCTIVE BEHAVIOR  Goal: Remains free of harm/injury (restraint for non violent/non self-detsructive behavior)  Description: INTERVENTIONS:  - Instruct patient/family regarding restraint use   - Assess and monitor physiologic and psychological status   - Provide interventions and comfort measures to meet assessed patient needs   - Identify and implement measures to help patient regain control  - Assess readiness for release of restraint   Outcome: Progressing  Goal: Returns to optimal restraint-free functioning  Description: INTERVENTIONS:  - Assess the patient's behavior and symptoms that indicate continued need for restraint  - Identify and implement measures to help patient regain control  - Assess readiness for release of restraint   Outcome: Progressing     Problem: Nutrition/Hydration-ADULT  Goal: Nutrient/Hydration intake appropriate for improving, restoring or maintaining nutritional needs  Description: Monitor and assess patient's nutrition/hydration status for malnutrition  Collaborate with interdisciplinary team and initiate plan and interventions as ordered  Monitor patient's weight and dietary intake as ordered or per policy  Utilize nutrition screening tool and intervene as necessary  Determine patient's food preferences and provide high-protein, high-caloric foods as appropriate       INTERVENTIONS:  - Monitor oral intake, urinary output, labs, and treatment plans  - Assess nutrition and hydration status and recommend course of action  - Evaluate amount of meals eaten  - Assist patient with eating if necessary   - Allow adequate time for meals  - Recommend/ encourage appropriate diets, oral nutritional supplements, and vitamin/mineral supplements  - Order, calculate, and assess calorie counts as needed  - Recommend, monitor, and adjust tube feedings and TPN/PPN based on assessed needs  - Assess need for intravenous fluids  - Provide specific nutrition/hydration education as appropriate  - Include patient/family/caregiver in decisions related to nutrition  Outcome: Progressing

## 2021-01-21 NOTE — PROGRESS NOTES
Progress Note - Talha Garcia 1960, 63 y o  male MRN: [id number]  Unit/Bed#: S -01 Encounter: [id number]  Primary Care Provider: Joao Hager DO   Date and time admitted to hospital: 1/17/2021  4:16 PM    * Pneumonia due to COVID-19 virus  Assessment & Plan  · Patient confirms COVID-19 positive 01/16/2021  · Initially presented to [address] [place] [address],[address] with respiratory distress and altered mental status  While at minor skin is he was intubated due to hypercapnia and hypoxia  He became hypotensive requiring pressor therapy and was transferred to [address] [place] [address] for further critical care management of severe COVID-19 infection  · Patient received 1st COVID-19 vaccine 1/8  · Chest CT with minimal ground-glass opacities and left lower lobe consolidation concerning for superimposed bacterial process  · Procalcitonin negative and antibiotics discontinued  · Received convalescent plasma 1/17  · Continue vitamin cocktail, changed to once daily Decadron  · Now on room air, prn 2L to maintain saturations greater than or equal to 90%    Acute respiratory failure with hypoxia and hypercarbia (HCC)  Assessment & Plan  · Patient presented with acute hypoxic and hypercapnic respiratory failure requiring intubation and mechanical ventilation secondary to COVID-19 pneumonia  · Patient extubated 1/18 and transferred out of ICU 1/20  · Now on 0-2 L    Acute metabolic encephalopathy  Assessment & Plan  · Secondary to hypoxia and hypercapnia with history of stroke and residual left-sided weakness  · Hypercapnia now resolved  · Avoid sedating medications  · Melatonin q h s    · Back to baseline mental status    Chronic kidney disease  Assessment & Plan  Lab Results   Component Value Date    EGFR 41 01/20/2021    EGFR 41 01/19/2021    EGFR 50 01/18/2021    CREATININE 1 76 (H) 01/20/2021    CREATININE 1 74 (H) 01/19/2021    CREATININE 1 50 (H) 01/18/2021     · Unclear baseline creatinine  · Currently stable  · Continue Bumex    Hematuria  Assessment & Plan  · Hematuria noted today, chronic bo   · Prn bo flushes   · Change lovenox to daily dose per home reg    Bacteremia  Assessment & Plan  · Patient with mixed Gram-positive blood cultures  · Admission blood cultures drawn on 01/17 at [facility] w/ 1 of 2 sets growing 2 colony types of coag neg staph   · Repeat blood cultures drawn on arrival to [facility] w/ 1 of 2 growing enterococcus faecalis and coag neg staph   · Transthoracic echo negative for vegetation  · Continue IV vancomycin pending final repeat culture results  · If blood culture results from 01/19 are negative at 72 hours no further vancomycin  · Appreciate Infectious Disease input    Atrial fibrillation (HCC)  Assessment & Plan  · Now normal sinus rhythm  · Rate controlled on Lopressor and Cardizem  · The Cardizem currently on hold due to low blood pressures  · Patient is on Lovenox 40 mg subcu daily at home, currently 1mg/kg b i d  Dosing    Morbid obesity   Assessment & Plan  · I recommend diet, weight loss  · Patient resides in nursing home and is nonambulatory    Hyperlipidemia  Assessment & Plan  · Continue statin    Essential hypertension  Assessment & Plan  · Continue Lopressor, Bumex  · Weaned off vasopressors    VTE Pharmacologic Prophylaxis:   Pharmacologic: Enoxaparin (Lovenox)  Mechanical VTE Prophylaxis in Place: Yes    Patient Centered Rounds: I have performed bedside rounds with nursing staff today  Discussions with Specialists or Other Care Team Provider: nursing, cm     Education and Discussions with Family / Patient: patient, will update wife     Time Spent for Care: 30 minutes  More than 50% of total time spent on counseling and coordination of care as described above      Current Length of Stay: 4 day(s)    Current Patient Status: Inpatient   Certification Statement: The patient will continue to require additional inpatient hospital stay due to pending improvement in hematuria, safe dc planning     Discharge Plan: pending clinical course     Code Status: Level 1 - Full Code      Subjective:   Pt seen and examined at bedside  Feels depressed  Wants to go home  Denies SOB  Objective:     Vitals:   Temp (24hrs), Av 2 °F (36 8 °C), Min:98 1 °F (36 7 °C), Max:98 3 °F (36 8 °C)    Temp:  [98 1 °F (36 7 °C)-98 3 °F (36 8 °C)] 98 1 °F (36 7 °C)  HR:  [64-82] 79  Resp:  [18-24] 21  BP: (92-94)/(52-54) 92/54  SpO2:  [95 %-97 %] 95 %  Body mass index is 56 15 kg/m²  Input and Output Summary (last 24 hours): Intake/Output Summary (Last 24 hours) at 2021 1550  Last data filed at 2021 1500  Gross per 24 hour   Intake 960 ml   Output 1720 ml   Net -760 ml       Physical Exam:     Physical Exam  Constitutional:       Appearance: Normal appearance  He is obese  HENT:      Head: Normocephalic and atraumatic  Mouth/Throat:      Mouth: Mucous membranes are moist    Eyes:      Extraocular Movements: Extraocular movements intact  Neck:      Musculoskeletal: Normal range of motion and neck supple  Cardiovascular:      Rate and Rhythm: Normal rate and regular rhythm  Heart sounds: Murmur present  Pulmonary:      Effort: Pulmonary effort is normal       Breath sounds: Normal breath sounds  Abdominal:      Palpations: Abdomen is soft  Genitourinary:     Comments: Punch colored urine   Musculoskeletal: Normal range of motion  Right lower leg: Edema present  Left lower leg: Edema present  Skin:     General: Skin is warm and dry  Neurological:      General: No focal deficit present  Mental Status: He is alert and oriented to person, place, and time     Psychiatric:         Mood and Affect: Mood normal       Comments: Flat affect depressed mood        Additional Data:     Labs:    Results from last 7 days   Lab Units 21  0639   WBC Thousand/uL 4 79   HEMOGLOBIN g/dL 11 1*   HEMATOCRIT % 39 5   PLATELETS Thousands/uL 148*   NEUTROS PCT % 89* LYMPHS PCT % 4*   MONOS PCT % 7   EOS PCT % 0     Results from last 7 days   Lab Units 01/20/21  0639   SODIUM mmol/L 135*   POTASSIUM mmol/L 3 7   CHLORIDE mmol/L 104   CO2 mmol/L 24   BUN mg/dL 50*   CREATININE mg/dL 1 76*   ANION GAP mmol/L 7   CALCIUM mg/dL 8 1*   ALBUMIN g/dL 2 7*   TOTAL BILIRUBIN mg/dL 0 30   ALK PHOS U/L 69   ALT U/L 28   AST U/L 21   GLUCOSE RANDOM mg/dL 136     Results from last 7 days   Lab Units 01/17/21  1003   INR  1 04     Results from last 7 days   Lab Units 01/20/21  0006 01/19/21  1736 01/19/21  1205 01/18/21  1224 01/18/21  0548 01/18/21  0149 01/17/21  0959   POC GLUCOSE mg/dl 151* 159* 151* 124 120 119 107         Results from last 7 days   Lab Units 01/21/21  0533 01/20/21  0639 01/19/21  0512 01/18/21  0421 01/17/21  1003   LACTIC ACID mmol/L  --   --   --   --  0 4*   PROCALCITONIN ng/ml 0 11 0 09 0 15 0 16 0 17           * I Have Reviewed All Lab Data Listed Above  * Additional Pertinent Lab Tests Reviewed: Noman 65 Admission Reviewed    Imaging:    Imaging Reports Reviewed Today Include: all  Imaging Personally Reviewed by Myself Includes:  none    Recent Cultures (last 7 days):     Results from last 7 days   Lab Units 01/19/21  1140 01/17/21  1715 01/17/21  1003   BLOOD CULTURE  No Growth at 48 hrs  No Growth at 48 hrs  Enterococcus faecalis*  Staphylococcus haemolyticus*  No Growth at 72 hrs  No Growth After 4 Days    Staphylococcus hominis*  Staphylococcus epidermidis*   GRAM STAIN RESULT   --  Gram positive cocci in pairs and chains* Gram positive cocci in clusters*       Last 24 Hours Medication List:   Current Facility-Administered Medications   Medication Dose Route Frequency Provider Last Rate    ascorbic acid  1,000 mg Per NG Tube Q12H [address] Yareli Haynes PA-C      aspirin  81 mg Oral Daily Yareli Haynes PA-C      atorvastatin  40 mg Per NG Tube HS Yareli Haynes PA-C      bisacodyl  10 mg Rectal Daily DOT Arias LOPEZ Haynes PA-C      bumetanide  2 mg Oral Daily Yareli Haynes PA-C      chlorhexidine  15 mL Swish & Spit Q12H [address] Yareli Haynes PA-C      cholecalciferol  2,000 Units Per NG Tube Daily Yareli Haynes PA-C      [START ON 1/22/2021] dexamethasone  6 mg Intravenous Daily Lionel Robbins PA-C      [START ON 1/22/2021] enoxaparin  40 mg Subcutaneous Q24H [address] Lionel Robbins PA-C      metoprolol tartrate  50 mg Oral TID Yareli Haynes PA-C      zinc sulfate  220 mg Per NG Tube Daily Yareli Haynes PA-C      Followed by   Dino Primes ON 1/25/2021] multivitamin with iron-minerals  15 mL Per NG Tube Daily Yareli Haynes PA-C      pantoprazole  40 mg Oral Early Morning Yareli Haynes PA-C      vancomycin  15 mg/kg (Adjusted) Intravenous Daily PRN Chito Ge MD          Today, Patient Was Seen By: Yo Chahal PA-C    ** Please Note: Dictation voice to text software may have been used in the creation of this document   **

## 2021-01-21 NOTE — ASSESSMENT & PLAN NOTE
· Patient with mixed Gram-positive blood cultures  · Admission blood cultures drawn on 01/17 at [facility] w/ 1 of 2 sets growing 2 colony types of coag neg staph   · Repeat blood cultures drawn on arrival to [facility] w/ 1 of 2 growing enterococcus faecalis and coag neg staph   · Transthoracic echo negative for vegetation  · Continue IV vancomycin pending final repeat culture results  · If blood culture results from 01/19 are negative at 72 hours no further vancomycin  · Appreciate Infectious Disease input

## 2021-01-21 NOTE — ASSESSMENT & PLAN NOTE
· Secondary to hypoxia and hypercapnia with history of stroke and residual left-sided weakness  · Hypercapnia now resolved  · Avoid sedating medications  · Melatonin q h s    · Back to baseline mental status

## 2021-01-21 NOTE — ASSESSMENT & PLAN NOTE
Lab Results   Component Value Date    EGFR 41 01/20/2021    EGFR 41 01/19/2021    EGFR 50 01/18/2021    CREATININE 1 76 (H) 01/20/2021    CREATININE 1 74 (H) 01/19/2021    CREATININE 1 50 (H) 01/18/2021     · Unclear baseline creatinine  · Currently stable  · Continue Bumex

## 2021-01-21 NOTE — ASSESSMENT & PLAN NOTE
· Patient confirms COVID-19 positive 01/16/2021  · Initially presented [place] [facility] with respiratory distress and altered mental status  While at minor skin is he was intubated due to hypercapnia and hypoxia  He became hypotensive requiring pressor therapy and was transferred to [facility] for further critical care management of severe COVID-19 infection  · Patient received 1st COVID-19 vaccine 1/8  · Chest CT with minimal ground-glass opacities and left lower lobe consolidation concerning for superimposed bacterial process     · Procalcitonin negative and antibiotics discontinued  · Received convalescent plasma 1/17  · Continue vitamin cocktail, changed to once daily Decadron  · Now on room air, prn 2L to maintain saturations greater than or equal to 90%

## 2021-01-21 NOTE — PLAN OF CARE
Problem: Prexisting or High Potential for Compromised Skin Integrity  Goal: Skin integrity is maintained or improved  Description: INTERVENTIONS:  - Identify patients at risk for skin breakdown  - Assess and monitor skin integrity  - Assess and monitor nutrition and hydration status  - Monitor labs   - Assess for incontinence   - Turn and reposition patient  - Assist with mobility/ambulation  - Relieve pressure over bony prominences  - Avoid friction and shearing  - Provide appropriate hygiene as needed including keeping skin clean and dry  - Evaluate need for skin moisturizer/barrier cream  - Collaborate with interdisciplinary team   - Patient/family teaching  - Consider wound care consult   Outcome: Progressing     Problem: Potential for Falls  Goal: Patient will remain free of falls  Description: INTERVENTIONS:  - Assess patient frequently for physical needs  -  Identify cognitive and physical deficits and behaviors that affect risk of falls    -  [place] fall precautions as indicated by assessment   - Educate patient/family on patient safety including physical limitations  - Instruct patient to call for assistance with activity based on assessment  - Modify environment to reduce risk of injury  - Consider OT/PT consult to assist with strengthening/mobility  Outcome: Progressing     Problem: SAFETY,RESTRAINT: NV/NON-SELF DESTRUCTIVE BEHAVIOR  Goal: Remains free of harm/injury (restraint for non violent/non self-detsructive behavior)  Description: INTERVENTIONS:  - Instruct patient/family regarding restraint use   - Assess and monitor physiologic and psychological status   - Provide interventions and comfort measures to meet assessed patient needs   - Identify and implement measures to help patient regain control  - Assess readiness for release of restraint   Outcome: Progressing  Goal: Returns to optimal restraint-free functioning  Description: INTERVENTIONS:  - Assess the patient's behavior and symptoms that indicate continued need for restraint  - Identify and implement measures to help patient regain control  - Assess readiness for release of restraint   Outcome: Progressing     Problem: Nutrition/Hydration-ADULT  Goal: Nutrient/Hydration intake appropriate for improving, restoring or maintaining nutritional needs  Description: Monitor and assess patient's nutrition/hydration status for malnutrition  Collaborate with interdisciplinary team and initiate plan and interventions as ordered  Monitor patient's weight and dietary intake as ordered or per policy  Utilize nutrition screening tool and intervene as necessary  Determine patient's food preferences and provide high-protein, high-caloric foods as appropriate       INTERVENTIONS:  - Monitor oral intake, urinary output, labs, and treatment plans  - Assess nutrition and hydration status and recommend course of action  - Evaluate amount of meals eaten  - Assist patient with eating if necessary   - Allow adequate time for meals  - Recommend/ encourage appropriate diets, oral nutritional supplements, and vitamin/mineral supplements  - Order, calculate, and assess calorie counts as needed  - Recommend, monitor, and adjust tube feedings and TPN/PPN based on assessed needs  - Assess need for intravenous fluids  - Provide specific nutrition/hydration education as appropriate  - Include patient/family/caregiver in decisions related to nutrition  Outcome: Progressing

## 2021-01-22 LAB
ALBUMIN SERPL BCP-MCNC: 3 G/DL (ref 3.5–5)
ALP SERPL-CCNC: 73 U/L (ref 46–116)
ALT SERPL W P-5'-P-CCNC: 31 U/L (ref 12–78)
ANION GAP SERPL CALCULATED.3IONS-SCNC: 7 MMOL/L (ref 4–13)
AST SERPL W P-5'-P-CCNC: 11 U/L (ref 5–45)
BACTERIA BLD CULT: NORMAL
BACTERIA UR QL AUTO: ABNORMAL /HPF
BASOPHILS # BLD AUTO: 0.01 THOUSANDS/ΜL (ref 0–0.1)
BASOPHILS NFR BLD AUTO: 0 % (ref 0–1)
BILIRUB SERPL-MCNC: 0.32 MG/DL (ref 0.2–1)
BILIRUB UR QL STRIP: NEGATIVE
BUN SERPL-MCNC: 69 MG/DL (ref 5–25)
CALCIUM ALBUM COR SERPL-MCNC: 8.7 MG/DL (ref 8.3–10.1)
CALCIUM SERPL-MCNC: 7.9 MG/DL (ref 8.3–10.1)
CHLORIDE SERPL-SCNC: 104 MMOL/L (ref 100–108)
CLARITY UR: ABNORMAL
CO2 SERPL-SCNC: 28 MMOL/L (ref 21–32)
COARSE GRAN CASTS URNS QL MICRO: ABNORMAL /LPF
COLOR UR: ABNORMAL
CREAT SERPL-MCNC: 2.4 MG/DL (ref 0.6–1.3)
EOSINOPHIL # BLD AUTO: 0 THOUSAND/ΜL (ref 0–0.61)
EOSINOPHIL NFR BLD AUTO: 0 % (ref 0–6)
ERYTHROCYTE [DISTWIDTH] IN BLOOD BY AUTOMATED COUNT: 18.4 % (ref 11.6–15.1)
FINE GRAN CASTS URNS QL MICRO: ABNORMAL /LPF
GFR SERPL CREATININE-BSD FRML MDRD: 28 ML/MIN/1.73SQ M
GLUCOSE SERPL-MCNC: 128 MG/DL (ref 65–140)
GLUCOSE SERPL-MCNC: 88 MG/DL (ref 65–140)
GLUCOSE UR STRIP-MCNC: NEGATIVE MG/DL
HCT VFR BLD AUTO: 43.8 % (ref 36.5–49.3)
HGB BLD-MCNC: 12.2 G/DL (ref 12–17)
HGB UR QL STRIP.AUTO: ABNORMAL
IMM GRANULOCYTES # BLD AUTO: 0.06 THOUSAND/UL (ref 0–0.2)
IMM GRANULOCYTES NFR BLD AUTO: 1 % (ref 0–2)
KETONES UR STRIP-MCNC: ABNORMAL MG/DL
LEUKOCYTE ESTERASE UR QL STRIP: ABNORMAL
LYMPHOCYTES # BLD AUTO: 0.27 THOUSANDS/ΜL (ref 0.6–4.47)
LYMPHOCYTES NFR BLD AUTO: 3 % (ref 14–44)
MCH RBC QN AUTO: 23.2 PG (ref 26.8–34.3)
MCHC RBC AUTO-ENTMCNC: 27.9 G/DL (ref 31.4–37.4)
MCV RBC AUTO: 83 FL (ref 82–98)
MONOCYTES # BLD AUTO: 0.82 THOUSAND/ΜL (ref 0.17–1.22)
MONOCYTES NFR BLD AUTO: 9 % (ref 4–12)
MUCOUS THREADS UR QL AUTO: ABNORMAL
NEUTROPHILS # BLD AUTO: 7.59 THOUSANDS/ΜL (ref 1.85–7.62)
NEUTS SEG NFR BLD AUTO: 87 % (ref 43–75)
NITRITE UR QL STRIP: NEGATIVE
NON-SQ EPI CELLS URNS QL MICRO: ABNORMAL /HPF
NRBC BLD AUTO-RTO: 0 /100 WBCS
OTHER STN SPEC: ABNORMAL
PH UR STRIP.AUTO: 5.5 [PH]
PLATELET # BLD AUTO: 119 THOUSANDS/UL (ref 149–390)
PMV BLD AUTO: 11.5 FL (ref 8.9–12.7)
POTASSIUM SERPL-SCNC: 3.4 MMOL/L (ref 3.5–5.3)
PROT SERPL-MCNC: 6.6 G/DL (ref 6.4–8.2)
PROT UR STRIP-MCNC: ABNORMAL MG/DL
RBC # BLD AUTO: 5.25 MILLION/UL (ref 3.88–5.62)
RBC #/AREA URNS AUTO: ABNORMAL /HPF
SODIUM SERPL-SCNC: 139 MMOL/L (ref 136–145)
SP GR UR STRIP.AUTO: 1.02 (ref 1–1.03)
UROBILINOGEN UR QL STRIP.AUTO: 0.2 E.U./DL
WBC # BLD AUTO: 8.75 THOUSAND/UL (ref 4.31–10.16)
WBC #/AREA URNS AUTO: ABNORMAL /HPF

## 2021-01-22 PROCEDURE — 82948 REAGENT STRIP/BLOOD GLUCOSE: CPT

## 2021-01-22 PROCEDURE — 85025 COMPLETE CBC W/AUTO DIFF WBC: CPT | Performed by: PHYSICIAN ASSISTANT

## 2021-01-22 PROCEDURE — 99233 SBSQ HOSP IP/OBS HIGH 50: CPT | Performed by: INTERNAL MEDICINE

## 2021-01-22 PROCEDURE — 99232 SBSQ HOSP IP/OBS MODERATE 35: CPT | Performed by: INTERNAL MEDICINE

## 2021-01-22 PROCEDURE — 80053 COMPREHEN METABOLIC PANEL: CPT | Performed by: PHYSICIAN ASSISTANT

## 2021-01-22 PROCEDURE — 99222 1ST HOSP IP/OBS MODERATE 55: CPT | Performed by: INTERNAL MEDICINE

## 2021-01-22 PROCEDURE — 81001 URINALYSIS AUTO W/SCOPE: CPT | Performed by: INTERNAL MEDICINE

## 2021-01-22 PROCEDURE — 99253 IP/OBS CNSLTJ NEW/EST LOW 45: CPT | Performed by: PSYCHIATRY & NEUROLOGY

## 2021-01-22 RX ORDER — METHOCARBAMOL 500 MG/1
500 TABLET, FILM COATED ORAL EVERY 8 HOURS PRN
Status: DISCONTINUED | OUTPATIENT
Start: 2021-01-22 | End: 2021-02-03

## 2021-01-22 RX ORDER — TRAMADOL HYDROCHLORIDE 50 MG/1
50 TABLET ORAL ONCE
Status: COMPLETED | OUTPATIENT
Start: 2021-01-22 | End: 2021-01-22

## 2021-01-22 RX ORDER — LANOLIN ALCOHOL/MO/W.PET/CERES
3 CREAM (GRAM) TOPICAL
Status: DISCONTINUED | OUTPATIENT
Start: 2021-01-22 | End: 2021-02-02

## 2021-01-22 RX ORDER — SERTRALINE HYDROCHLORIDE 100 MG/1
100 TABLET, FILM COATED ORAL DAILY
Status: DISCONTINUED | OUTPATIENT
Start: 2021-01-23 | End: 2021-01-22

## 2021-01-22 RX ORDER — DULOXETIN HYDROCHLORIDE 20 MG/1
20 CAPSULE, DELAYED RELEASE ORAL DAILY
Status: DISCONTINUED | OUTPATIENT
Start: 2021-01-22 | End: 2021-01-22

## 2021-01-22 RX ORDER — SERTRALINE HYDROCHLORIDE 25 MG/1
25 TABLET, FILM COATED ORAL DAILY
Status: DISCONTINUED | OUTPATIENT
Start: 2021-01-22 | End: 2021-01-22

## 2021-01-22 RX ADMIN — DEXAMETHASONE SODIUM PHOSPHATE 6 MG: 4 INJECTION INTRA-ARTICULAR; INTRALESIONAL; INTRAMUSCULAR; INTRAVENOUS; SOFT TISSUE at 09:15

## 2021-01-22 RX ADMIN — METOPROLOL TARTRATE 50 MG: 50 TABLET, FILM COATED ORAL at 11:59

## 2021-01-22 RX ADMIN — PANTOPRAZOLE SODIUM 40 MG: 40 TABLET, DELAYED RELEASE ORAL at 05:18

## 2021-01-22 RX ADMIN — ACETAMINOPHEN 650 MG: 325 TABLET, FILM COATED ORAL at 11:59

## 2021-01-22 RX ADMIN — CHLORHEXIDINE GLUCONATE 0.12% ORAL RINSE 15 ML: 1.2 LIQUID ORAL at 12:00

## 2021-01-22 RX ADMIN — ENOXAPARIN SODIUM 40 MG: 40 INJECTION SUBCUTANEOUS at 09:15

## 2021-01-22 RX ADMIN — METOPROLOL TARTRATE 50 MG: 50 TABLET, FILM COATED ORAL at 22:01

## 2021-01-22 RX ADMIN — TRAMADOL HYDROCHLORIDE 50 MG: 50 TABLET, FILM COATED ORAL at 23:31

## 2021-01-22 RX ADMIN — ASPIRIN 81 MG: 81 TABLET ORAL at 11:01

## 2021-01-22 RX ADMIN — OXYCODONE HYDROCHLORIDE AND ACETAMINOPHEN 1000 MG: 500 TABLET ORAL at 11:00

## 2021-01-22 RX ADMIN — METOPROLOL TARTRATE 50 MG: 50 TABLET, FILM COATED ORAL at 17:54

## 2021-01-22 RX ADMIN — OXYCODONE HYDROCHLORIDE AND ACETAMINOPHEN 1000 MG: 500 TABLET ORAL at 22:00

## 2021-01-22 RX ADMIN — Medication 2000 UNITS: at 11:59

## 2021-01-22 RX ADMIN — CHLORHEXIDINE GLUCONATE 0.12% ORAL RINSE 15 ML: 1.2 LIQUID ORAL at 22:01

## 2021-01-22 RX ADMIN — BUMETANIDE 2 MG: 1 TABLET ORAL at 09:00

## 2021-01-22 RX ADMIN — ATORVASTATIN CALCIUM 40 MG: 40 TABLET, FILM COATED ORAL at 22:00

## 2021-01-22 RX ADMIN — ZINC SULFATE 220 MG (50 MG) CAPSULE 220 MG: CAPSULE at 09:00

## 2021-01-22 RX ADMIN — Medication 3 MG: at 22:01

## 2021-01-22 NOTE — PROGRESS NOTES
Progress Note - Nolvia Nuñez 1960, 63 y o  male MRN: [id number]    Unit/Bed#: S -01 Encounter: [id number]    Primary Care Provider: Polo Gonzales DO   Date and time admitted to hospital: 1/17/2021  4:16 PM        Hematuria  Assessment & Plan  Hematuria noted today, chronic bo   Prn bo flushes   Change lovenox to daily dose per home reg    Bacteremia  Assessment & Plan  · Patient with mixed Gram-positive blood cultures  · Admission blood cultures drawn on 01/17 at [facility] w/ 1 of 2 sets growing 2 colony types of coag neg staph   · Repeat blood cultures drawn on arrival to [facility] w/ 1 of 2 growing enterococcus faecalis and coag neg staph   · Transthoracic echo negative for vegetation  · Continue IV vancomycin pending final repeat culture results  · If blood culture results from 01/19 are negative at 72 hours no further vancomycin  · Appreciated ID and okay to DC abx   Chronic kidney disease  Assessment & Plan  Now with PATRICK   Nephrology input appreciated  Appears volume overloaded however will defer to nephrology regarding further diuresis given that creat trending up on bumex  Atrial fibrillation ([address] )  Assessment & Plan  Now normal sinus rhythm  Rate controlled on Lopressor and Cardizem  The Cardizem currently on hold due to low blood pressures  Patient is on Lovenox 40 mg subcu daily at home, currently 1mg/kg b i d  Dosing    Morbid obesity   Assessment & Plan  I recommend diet, weight loss  Patient resides in nursing home and is nonambulatory    Acute metabolic encephalopathy  Assessment & Plan  Secondary to hypoxia and hypercapnia with history of stroke and residual left-sided weakness  Hypercapnia now resolved  Avoid sedating medications  Melatonin q h s    Back to baseline mental status    Hyperlipidemia  Assessment & Plan  Continue statin    Essential hypertension  Assessment & Plan  Continue Lopressor, Bumex  Weaned off vasopressors    * Pneumonia due to COVID-19 virus  Assessment & Plan  Patient confirms CNCPT-[id number] positive 2021  Initially presented to [address] [facility] with respiratory distress and altered mental status  While at minor skin is he was intubated due to hypercapnia and hypoxia  He became hypotensive requiring pressor therapy and was transferred to [facility] for further critical care management of severe COVID-19 infection  Patient received 1st COVID-19 vaccine   Chest CT with minimal ground-glass opacities and left lower lobe consolidation concerning for superimposed bacterial process  Procalcitonin negative and antibiotics discontinued  Received convalescent plasma   Continue vitamin cocktail, changed to once daily Decadron  Now on room air, prn 2L to maintain saturations greater than or equal to 90%        VTE Pharmacologic Prophylaxis:   Pharmacologic: Heparin  Mechanical VTE Prophylaxis in Place: Yes    Patient Centered Rounds: I have performed bedside rounds with nursing staff today  Discussions with Specialists or Other Care Team Provider: Discussed with psychiatry   Education and Discussions with Family / Patient: Discussed with patient and with wife  Time Spent for Care: 30 minutes  More than 50% of total time spent on counseling and coordination of care as described above  Current Length of Stay: 5 day(s)    Current Patient Status: Inpatient   Certification Statement: The patient will continue to require additional inpatient hospital stay due to worsening renal failure  Discharge Plan: Not medically stable for DC  Code Status: Level 1 - Full Code      Subjective:   Patient seen and examined   Feeling "the same"  Initially had wanted to go home   No stating "I agree" when I say he should stay       Objective:     Vitals:   Temp (24hrs), Av 4 °F (36 9 °C), Min:98 3 °F (36 8 °C), Max:98 7 °F (37 1 °C)    Temp:  [98 3 °F (36 8 °C)-98 7 °F (37 1 °C)] 98 7 °F (37 1 °C)  HR:  [75-84] 84  Resp:  [20-24] 24  BP: (100-142)/(55-67) 127/67  SpO2:  [95 %-97 %] 97 %  Body mass index is 55 62 kg/m²  Input and Output Summary (last 24 hours): Intake/Output Summary (Last 24 hours) at 1/22/2021 1706  Last data filed at 1/22/2021 1448  Gross per 24 hour   Intake 942 ml   Output 250 ml   Net 692 ml       Physical Exam:     Physical Exam  Constitutional:       General: He is not in acute distress  Appearance: He is not ill-appearing or diaphoretic  Cardiovascular:      Rate and Rhythm: Normal rate  Pulmonary:      Breath sounds: Wheezing present  Skin:     Coloration: Skin is pale  Skin is not jaundiced  Findings: No bruising or lesion  Neurological:      General: No focal deficit present  Cranial Nerves: No cranial nerve deficit  Sensory: No sensory deficit        Gait: Gait normal          Additional Data:     Labs:    Results from last 7 days   Lab Units 01/22/21  0527   WBC Thousand/uL 8 75   HEMOGLOBIN g/dL 12 2   HEMATOCRIT % 43 8   PLATELETS Thousands/uL 119*   NEUTROS PCT % 87*   LYMPHS PCT % 3*   MONOS PCT % 9   EOS PCT % 0     Results from last 7 days   Lab Units 01/22/21  0527   SODIUM mmol/L 139   POTASSIUM mmol/L 3 4*   CHLORIDE mmol/L 104   CO2 mmol/L 28   BUN mg/dL 69*   CREATININE mg/dL 2 40*   ANION GAP mmol/L 7   CALCIUM mg/dL 7 9*   ALBUMIN g/dL 3 0*   TOTAL BILIRUBIN mg/dL 0 32   ALK PHOS U/L 73   ALT U/L 31   AST U/L 11   GLUCOSE RANDOM mg/dL 88     Results from last 7 days   Lab Units 01/17/21  1003   INR  1 04     Results from last 7 days   Lab Units 01/22/21  1345 01/20/21  0006 01/19/21  1736 01/19/21  1205 01/18/21  1224 01/18/21  0548 01/18/21  0149 01/17/21  0959   POC GLUCOSE mg/dl 128 151* 159* 151* 124 120 119 107         Results from last 7 days   Lab Units 01/21/21  0533 01/20/21  0639 01/19/21  0512 01/18/21  0421 01/17/21  1003   LACTIC ACID mmol/L  --   --   --   --  0 4*   PROCALCITONIN ng/ml 0 11 0 09 0 15 0 16 0 17           * I Have Reviewed All Lab Data Listed Above   * Additional Pertinent Lab Tests Reviewed: All Labs For Current Hospital Admission Reviewed    Imaging:    Imaging Reports Reviewed Today Include:   None pertinent to this encounter  Imaging Personally Reviewed by Myself Includes:  As abovel    Recent Cultures (last 7 days):     Results from last 7 days   Lab Units 01/19/21  1140 01/17/21  1715 01/17/21  1003   BLOOD CULTURE  No Growth at 72 hrs  No Growth at 72 hrs  No Growth After 4 Days  Enterococcus faecalis*  Staphylococcus haemolyticus* No Growth After 5 Days    Staphylococcus hominis*  Staphylococcus epidermidis*   GRAM STAIN RESULT   --  Gram positive cocci in pairs and chains* Gram positive cocci in clusters*       Last 24 Hours Medication List:   Current Facility-Administered Medications   Medication Dose Route Frequency Provider Last Rate    acetaminophen  650 mg Oral Q6H PRN Suresh Kent PA-C      ascorbic acid  1,000 mg Per NG Tube Q12H [address] Yareli Haynes PA-C      aspirin  81 mg Oral Daily Yareli Haynes PA-C      atorvastatin  40 mg Per NG Tube HS Yareli Haynes PA-C      bisacodyl  10 mg Rectal Daily PRN Yareli Haynes PA-C      bumetanide  2 mg Oral Daily Yareli Haynes PA-C      chlorhexidine  15 mL Swish & Spit Q12H [address] Yareli Haynes PA-C      cholecalciferol  2,000 Units Per NG Tube Daily Yareli Haynes PA-C      dexamethasone  6 mg Intravenous Daily Lionel Robbins PA-C      enoxaparin  40 mg Subcutaneous Q24H [address] Lionel Robbins PA-C      metoprolol tartrate  50 mg Oral TID Yareli Haynes PA-C      zinc sulfate  220 mg Per NG Tube Daily Yareli Haynes PA-C      Followed by   Iggy Cho ON 1/25/2021] multivitamin with iron-minerals  15 mL Per NG Tube Daily Yareli Haynes PA-C      pantoprazole  40 mg Oral Early Morning Yareli Haynes PA-C      [START ON 1/23/2021] sertraline  125 mg Oral Daily Steph Clark MD          Today, Patient Was Seen By: Tamara Hall MD    ** Please Note: Dictation voice to text software may have been used in the creation of this document   **

## 2021-01-22 NOTE — CONSULTS
Consultation - Lucas 1 63 y o  male MRN: [id number]  Unit/Bed#: S -97 Encounter: [id number]        History of Present Illness   Physician Requesting Consult: Mell Tran MD  Reason for Consult / Principal Problem: RUBÉN Johnson is a 63 y o  male with hypertension, hyperlipidemia, AFib, cardiomyopathy, aortic wall replacement, currently admitted with COVID-19 being treated for such  Patient had a complicated stay with complicated symptoms of COVID requiring intensive care unit level of care for stabilization of his hypotension, and requiring intubation  Patient is now transferred out of intensive care unit  Patient has verbalized worsening depression,  and yesterday at about 9:30 p m  The team was calm after patient stated he would not like to stay in the hospital anymore  Patient seen depressed and with passive death wishes as per notes  Patient states he has had difficulty sleeping, poor appetite, which has been confirmed by the nurse, poor energy levels, an intense sense of guilt over his financial restraints in the bird and 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 wife, poor concentration and attention capacity, psychomotor retardation with passive suicidal wishes  Patient has an intense sense of helplessness, hopelessness and worthlessness, and states am too far gone"  Patient denies suicidal plan or intent and he denies homicidal ideation  Patient denies visual or auditory hallucinations  From collateral obtained by wife Patient has a history of depression that has been well controlled on Zoloft 100 mg  Patient has a learning disability and has been living in a nursing since 2016 after he had a stroke  As per wife he is not returning home, which represents for patient and added strain as he misses his wife very much and feels isolated  In addition patient feels lonely, has no Children and his brothers are       Patient denies any symptoms consistent with khadijah  Psychiatric Review Of Systems:  Problems with sleep: yes, decreased  Appetite changes: yes, decreased  Weight changes: yes, decreased  Low energy/anergy: yes  Low interest/pleasure/anhedonia: yes  Somatic symptoms: yes  Anxiety/panic: no  Khadijah: no  Guilt/hopeless: yes  Self injurious behavior/risky behavior: no    Historical Information   Prior psychiatric diagnoses:  Depression  Inpatient hospitalizations: Denies  Suicide attempts: Denies  Self-harm behaviors: Denies  Violent behavior: Denies  Outpatient treatment: Denies  Psychiatric medication trial: zoloft    Substance Abuse History:  Social History     Tobacco Use    Smoking status: Never Smoker    Smokeless tobacco: Never Used   Substance Use Topics    Alcohol use: Never     Frequency: Never     Comment: ocassion /never drank alcohol per [employer]     Drug use: No      Patient denies use of tobacco, alcohol, or illicit drugs     I have assessed this patient for substance use within the past 12 months  History of IP/OP rehabilitation program: Denies    Family Psychiatric History:   Patient denies any known family history of psychiatric illness, suicide attempt, or substance abuse    Social History  Marital history: /Civil Union  Children: no  Living arrangement:   nursing home  Functioning Relationships: alone & isolated  Education: learning disabilituies  Occupational History: on permanent disability  Other Pertinent History: None      Traumatic History:   Abuse: none reported  Other Traumatic Events: none reported    Past Medical History:   Diagnosis Date    Allergic rhinitis     last assessed 9/12/12    Finger fracture, right     Closed fx of the middle phalanx of the right 5th finger  last assessed 1/30/14    GI bleed 2/22/2019    Hemorrhoids     last assessed 2/10/14    Hyperlipidemia     Hypertension     Stroke [facility])        Medical Review Of Systems:  Review of Systems - Negative except as noted in HPI  shortness of breath, nech pain, back pain and buttocks pain    Meds/Allergies   current meds:   Current Facility-Administered Medications   Medication Dose Route Frequency    acetaminophen (TYLENOL) tablet 650 mg  650 mg Oral Q6H PRN    ascorbic acid (VITAMIN C) tablet 1,000 mg  1,000 mg Per NG Tube Q12H [address]    aspirin (ECOTRIN LOW STRENGTH) EC tablet 81 mg  81 mg Oral Daily    atorvastatin (LIPITOR) tablet 40 mg  40 mg Per NG Tube HS    bisacodyl (DULCOLAX) rectal suppository 10 mg  10 mg Rectal Daily PRN    bumetanide (BUMEX) tablet 2 mg  2 mg Oral Daily    chlorhexidine (PERIDEX) 0 12 % oral rinse 15 mL  15 mL Swish & Spit Q12H [address]    cholecalciferol (VITAMIN D3) tablet 2,000 Units  2,000 Units Per NG Tube Daily    dexamethasone (DECADRON) injection 6 mg  6 mg Intravenous Daily    enoxaparin (LOVENOX) subcutaneous injection 40 mg  40 mg Subcutaneous Q24H ARACELIS    metoprolol tartrate (LOPRESSOR) tablet 50 mg  50 mg Oral TID    zinc sulfate (ZINCATE) capsule 220 mg  220 mg Per NG Tube Daily    Followed by   Garfield Quan ON 1/25/2021] multivitamin with iron-minerals liquid 15 mL  15 mL Per NG Tube Daily    pantoprazole (PROTONIX) EC tablet 40 mg  40 mg Oral Early Morning    [START ON 1/23/2021] sertraline (ZOLOFT) tablet 125 mg  125 mg Oral Daily     Allergies   Allergen Reactions    Amiodarone      Developed Rash    Penicillins Rash     However, has subsequently tolerated Cefazolin and Cefepime       Objective   Vital signs in last 24 hours:  Temp:  [98 3 °F (36 8 °C)-98 7 °F (37 1 °C)] 98 7 °F (37 1 °C)  HR:  [75-84] 84  Resp:  [20-24] 24  BP: (100-142)/(55-67) 127/67    Mental Status Exam:  Appearance:  alert, fair eye contact, appears older than stated age and marginal grooming/hygiene   Behavior:  calm, limited cooperativity and laying in bed   Motor: no abnormal movements   Speech:  spontaneous, clear, normal rate, normal volume and coherent   Mood:  depressed   Affect:  mood-congruent, constricted and blunted   Thought Process:  organized, goal directed, normal rate of thoughts   Thought Content: no verbalized delusions or overt paranoia   Perceptual disturbances: no reported hallucinations and does not appear to be responding to internal stimuli at this time   Risk Potential: Passive death wishes, Low potential for aggression based on previous behavior   Cognition: oriented to self and situation, memory grossly intact, appears to be of average intelligence, attention span appeared shorter than expected for age and cognition not formally tested   Insight:  Limited   Judgment: Limited     Laboratory results:  I have personally reviewed all pertinent laboratory/tests results  Assessment/Plan   Ilda Reed is a 63 y o  male with hypertension, hyperlipidemia, cardiomyopathy, aortic valve replacement, currently being treated for COVID-19 after a long hospital stay  Patient comes from a nursing home after he had a stroke 2 years ago  He has a poor support network consisting mostly of his wife  Patient has a history of depression well controlled on Zoloft, which patient has been unable to receive during his hospital stay  Patient feels hopeless helpless and worthless, has passive death wishes and catastrophic thoughts  No signs of psychosis  No signs consistent of zabrina    Diagnosis:   Major depressive disorder, severe, recurrent without psychotic symptoms    Recommended Treatment:   1-1 for patient and staff safety  Will start patient on 125 mg of Zoloft       Risks, benefits and possible side effects of Medications:   Risks, benefits, and possible side effects of medications could not be explained to the patient due to inability to tolerate interview            Sandeep Hoskins MD    This note was not shared with the patient due to this is a psychotherapy note

## 2021-01-22 NOTE — ASSESSMENT & PLAN NOTE
Now with PATRICK   Nephrology input appreciated  Appears volume overloaded however will defer to nephrology regarding further diuresis given that creat trending up on bumex

## 2021-01-22 NOTE — ASSESSMENT & PLAN NOTE
Secondary to hypoxia and hypercapnia with history of stroke and residual left-sided weakness  Hypercapnia now resolved  Avoid sedating medications  Melatonin q h s    Back to baseline mental status

## 2021-01-22 NOTE — UTILIZATION REVIEW
Notification of Inpatient Admission/Inpatient Authorization Request   This is a Notification of Inpatient Admission for Anluis a  Be advised that this patient was admitted to our facility under Inpatient Status  Contact Chito Heredia at [phone] for additional admission information  [facility]  DEDICATED [phone]  Patient Name:   Moises Putnam   YOB: 1960       [address]   [address]:   [address]  Tax ID: [id number]  NPI: [id number] Attending Provider/NPI:  Address:  Phone: [facility] [[id number]]  Same as the facility  [phone]   Place of Service Code: 24 Place of Service Name:  [address] [place] [address]   Start Date: 1/17/21 1616     Discharge Date & Time: No discharge date for patient encounter  Type of Admission: Inpatient Status Discharge Disposition   (if discharged): [address] [place] [address]   Patient Diagnoses: [id number] [U07 1]     Orders: Admission Orders (From admission, onward)     Ordered        01/17/21 [facility]  Once                    Assigned Utilization Review Contact: Chito Heredia  Utilization   Network Utilization Review Department  Phone: [phone]; Fax [phone]  Email: [email or web address]   ATTENTION PAYERS: Please call the assigned Utilization  directly with any questions or concerns ALL voicemails in the department are confidential  Send all requests for admission clinical reviews, approved or denied determinations and any other requests to dedicated fax number belonging to the campus where the patient is receiving treatment        Initial Clinical Review    Admission: Date/Time/Statement:   Admission Orders (From admission, onward)     Ordered        01/17/21 [facility]  Once                   Orders Placed This Encounter   Procedures   [facility] Status:   Standing     Number of Occurrences:   1     Order Specific Question:   Level of Care     Answer:   Critical Care [15]     Order Specific Question:   Estimated length of stay     Answer:   More than 2 Midnights     Order Specific Question:   Certification     Answer:   I certify that inpatient services are medically necessary for this patient for a duration of greater than two midnights  See H&P and MD Progress Notes for additional information about the patient's course of treatment  Assessment/Plan: 64 yo m w/hx stroke, htn transferred from [facility] THE ED to [address] ICU admitted as inpatient due to COVID 19 pneumonia and hypercapnic resp failure  Presented to [facility] ED from nursing home with altered mental status, fever, and resp distress, found to be COVID positive within the past week  Worsened over prior 24 hrs with mi 80's sat on 3 liters  Improved to 90% on 8 liters  Was somnolent with septic workup in progress; determined hypercapnic, hypoxic; baseline w/confusion but interactive  imaging showed groundglass opacities with superimposed bacterial LLL pneumonia  Decision made to intubate, developed hypotension requiring IVF bolus & pressors  Decision then made to transfer for higher level of care for critical care admission  On arrival remained intubated on pressors, which were titrated off  He is in septic and hypovolemic shock  Convalescent plasma ordered, inflammatory workup in progress with COVID treatment & IV antbx  1/18: remains intubated, sedated  Weaning off propofol to switch to precedex  Continue IV antbx, lovenox given for elevated d dimer  Continue COVID vitamins, nebs, IV steroid       Temperature Pulse Respirations Blood Pressure SpO2   01/17/21 1643 01/17/21 1643 01/17/21 1643 01/17/21 1643 01/17/21 1624   100 2 °F (37 9 °C) 81 (!) 27 107/57 96 %         Pain Score       01/17/21 2000       No Pain          Wt Readings from Last 1 Encounters:   01/22/21 (!) 181 kg (398 lb 13 oz)     Additional Vital Signs:   Date/Time  Temp  Pulse  Resp BP MAP  SpO2 FiO2 (%) O2 Device   01/18/21 1152         98 %     01/18/21 1100  99 °F (37 2 °C)  73   113/64 83 97 %     01/18/21 0930  99 7 °F (37 6 °C)  73   95/53 68 97 %     01/18/21 0835         97 % 40 Ventilator   01/18/21 0830  99 7 °F (37 6 °C)  82   119/66 81 98 %     01/18/21 0730  99 5 °F (37 5 °C)  76  23Abnormal  127/62 85 98 %     01/18/21 0400  99 °F (37 2 °C)  72  22 107/65 82 95 %     01/18/21 0304         95 %     01/18/21 0300  98 6 °F (37 °C)  64  22 108/61 80 96 %     01/18/21 0230  98 6 °F (37 °C)  66  22 111/62 79 95 %     01/18/21 0214  98 4 °F (36 9 °C)  67  22 104/62       01/18/21 0200  98 6 °F (37 °C)  70  22 104/62 78 93 %     01/18/21 0100  98 6 °F (37 °C)  68  22 98/54 71 94 %     01/18/21 0027  98 8 °F (37 1 °C)  68  22 109/60  94 %     01/18/21 0000  98 4 °F (36 9 °C)  69  22 109/60 79 95 %     01/17/21 2100  99 °F (37 2 °C)  66  22 95/54 70 93 %     01/17/21 2000  99 1 °F (37 3 °C)  67  20 109/65 82 94 %     01/17/21 1953         93 % 40 Ventilator   01/17/21 1900  99 7 °F (37 6 °C)  73  22 93/57 70 93 %     01/17/21 1820         97 % 40 Ventilator   01/17/21 1806  99 9 °F (37 7 °C)  68  28Abnormal  120/66 88 98 %         Pertinent Labs/Diagnostic Test Results:   1/17 PCXR: chf vs  Fluid overload  1/17 repeat PCXR: same as above  1/17 CT head: No acute intracranial abnormality      Stable areas of encephalomalacia   1/17 CTA chest a&p: Perihilar groundglass is favored to be on the basis of pulmonary edema rather than infection  Cardiomegaly is present    Right lower lobe endobronchial mucous plugging with associated atelectasis    Tip of the feeding tube is terminating in stomach with the sidehole located at the level of the gastroesophageal junction  Recommend advancement    No evidence for bowel obstruction  1/17 PCXR:No evidence of pneumothorax  Otherwise no change from chest radiograph performed earlier in the day  1/17 EKG: Sinus rhythm with 1st degree A-V block  Right axis deviation  Non-specific intra-ventricular conduction block    1/18 PCXR: ET tube and enteric tube remain in position    Probable mild pulmonary vascular congestion, improved  Results from last 7 days   Lab Units 01/17/21  1404   SARS-COV-2  Positive*     Results from last 7 days   Lab Units 01/22/21  0527 01/20/21  0639 01/19/21  0512 01/18/21  0421 01/17/21  1003   WBC Thousand/uL 8 75 4 79 6 54 5 22  --  7 33   HEMOGLOBIN g/dL 12 2 11 1* 11 5* 11 9*  --  12 5   HEMATOCRIT % 43 8 39 5 38 9 39 8  --  45 4   PLATELETS Thousands/uL 119* 148* 158 166   < > 181   NEUTROS ABS Thousands/µL 7 59 4 24 5 78 4 51  --  5 84    < > = values in this interval not displayed           Results from last 7 days   Lab Units 01/22/21  0527 01/20/21  0639 01/19/21  0512 01/18/21  0421 01/17/21  1003   SODIUM mmol/L 139 135* 141 141 143   POTASSIUM mmol/L 3 4* 3 7 3 9 4 0 4 9   CHLORIDE mmol/L 104 104 104 105 104   CO2 mmol/L 28 24 27 24 33*   ANION GAP mmol/L 7 7 10 12 6   BUN mg/dL 69* 50* 47* 36* 24   CREATININE mg/dL 2 40* 1 76* 1 74* 1 50* 1 34*   EGFR ml/min/1 73sq m 28 41 41 50 57   CALCIUM mg/dL 7 9* 8 1* 8 4 8 3 8 3   MAGNESIUM mg/dL  --  2 5  --   --  2 4     Results from last 7 days   Lab Units 01/22/21  0527 01/20/21  0639 01/18/21  0421 01/17/21  1003   AST U/L 11 21 20 20   ALT U/L 31 28 20 26   ALK PHOS U/L 73 69 86 101   TOTAL PROTEIN g/dL 6 6 6 4 6 8 7 7   ALBUMIN g/dL 3 0* 2 7* 2 9* 3 3*   TOTAL BILIRUBIN mg/dL 0 32 0 30 0 53 0 20     Results from last 7 days   Lab Units 01/22/21  1345 01/20/21  0006 01/19/21  1736 01/19/21  1205 01/18/21  1224 01/18/21  0548 01/18/21  0149 01/17/21  0959   POC GLUCOSE mg/dl 128 151* 159* 151* 124 120 119 107     Results from last 7 days   Lab Units 01/22/21  0527 01/20/21  0639 01/19/21  0512 01/18/21  0421 01/17/21  1003   GLUCOSE RANDOM mg/dL 88 136 137 126 114     Results from last 7 days   Lab Units 01/17/21  1137   PH ART  7 343*   PCO2 ART mm Hg 50 5*   PO2 ART mm Hg 280 0*   HCO3 ART mmol/L 26 8   BASE EXC ART mmol/L 0 5   O2 CONTENT ART mL/dL 17 5   O2 HGB, ARTERIAL % 98 2*   ABG SOURCE  Radial, Left     Results from last 7 days   Lab Units 01/17/21  1003   PH IVONNE  7 113*   PCO2 IVONNE mm Hg 101 1*   PO2 IVONNE mm Hg 103 9*   HCO3 IVONNE mmol/L 31 6*   BASE EXC IVONNE mmol/L -0 7   O2 CONTENT IVONNE ml/dL 17 8   O2 HGB, VENOUS % 94 5*         Results from last 7 days   Lab Units 01/17/21  1714   CK TOTAL U/L 76     Results from last 7 days   Lab Units 01/17/21  1003   TROPONIN I ng/mL 0 02     Results from last 7 days   Lab Units 01/20/21  0639 01/18/21  0421 01/17/21  1714   D-DIMER QUANTITATIVE ug/ml FEU 0 81* 1 02* 1 15*     Results from last 7 days   Lab Units 01/17/21  1003   PROTIME seconds 13 4   INR  1 04   PTT seconds 33     Results from last 7 days   Lab Units 01/21/21  0533 01/20/21  0639 01/19/21  0512 01/18/21  0421 01/17/21  1003   PROCALCITONIN ng/ml 0 11 0 09 0 15 0 16 0 17     Results from last 7 days   Lab Units 01/17/21  1003   LACTIC ACID mmol/L 0 4*     Results from last 7 days   Lab Units 01/18/21  0421 01/17/21  1714   NT-PRO BNP pg/mL 1,994* 3,765*     Results from last 7 days   Lab Units 01/20/21  0639 01/18/21  0421 01/17/21  1714   FERRITIN ng/mL 28 31 21     Results from last 7 days   Lab Units 01/17/21  1714   HEP B S AG  Non-reactive   HEP C AB  Non-reactive   HEP B C IGM  Non-reactive   HEP B C TOTAL AB  Non-reactive         Results from last 7 days   Lab Units 01/20/21  0639 01/18/21  0421 01/17/21  1714   CRP mg/L 8 1* 30 0* 28 8*     Results from last 7 days   Lab Units 01/17/21  1404   INFLUENZA A PCR  Negative   INFLUENZA B PCR  Negative   RSV PCR  Negative       Results from last 7 days   Lab Units 01/19/21  1140 01/17/21  1715 01/17/21  1003   BLOOD CULTURE  No Growth at 72 hrs  No Growth at 72 hrs   No Growth After 4 Days  Enterococcus faecalis*  Staphylococcus haemolyticus* No Growth After 5 Days    Staphylococcus hominis*  Staphylococcus epidermidis*   GRAM STAIN RESULT   --  Gram positive cocci in pairs and chains* Gram positive cocci in clusters*         Past Medical History:   Diagnosis Date    Allergic rhinitis     last assessed 9/12/12    Finger fracture, right     Closed fx of the middle phalanx of the right 5th finger  last assessed 1/30/14    GI bleed 2/22/2019    Hemorrhoids     last assessed 2/10/14    Hyperlipidemia     Hypertension     Stroke [facility])      Present on Admission:   Atrial fibrillation ([facility] [address] )   Pneumonia due to COVID-19 virus   Acute respiratory failure with hypoxia and hypercarbia (HCC)   CVA (cerebral vascular accident) ([facility] [address] )   Acute metabolic encephalopathy   Essential hypertension   Aortic stenosis, mild   Hyperlipidemia   Morbid obesity       Admitting Diagnosis: COVID-19 [U07 1]  Age/Sex: 63 y o  male  Admission Orders:  Scheduled Medications:  ascorbic acid, 1,000 mg, Per NG Tube, Q12H [address]  atorvastatin, 40 mg, Per NG Tube, HS  cefepime, 2,000 mg, Intravenous, Q8H  chlorhexidine, 15 mL, Swish & Spit, Q12H [address]  cholecalciferol, 2,000 Units, Per NG Tube, Daily  dexamethasone, 0 1 mg/kg, Intravenous, Q12H  enoxaparin, 60 mg, Subcutaneous, Q12H  zinc sulfate, 220 mg, Per NG Tube, Daily    Followed by  Felicity Hodge ON 1/25/2021] multivitamin with iron-minerals, 15 mL, Per NG Tube, Daily  omeprazole (PRILOSEC) suspension 2 mg/mL, 20 mg, Oral, Daily  vancomycin, 2,000 mg, Intravenous, Q12H      Continuous IV Infusions:  propofol, 5-50 mcg/kg/min, Intravenous, Titrated  Levophed, weaned off on arrival     PRN Meds:  acetaminophen, 650 mg, Oral, Q6H PRN  bisacodyl, 10 mg, Rectal, Daily PRN       Intubated/vented  Non violent restraints t limbs  Neuro checks q2h  Tele  scd's  Transfuse convalescent plasma  NPO    IP CONSULT TO INFECTIOUS DISEASES  IP CONSULT TO PSYCHIATRY  IP CONSULT TO NEPHROLOGY    Network Utilization Review Department  ATTENTION: Please call with any questions or concerns to [phone] and carefully listen to the prompts so that you are directed to the right person  All voicemails are confidential   Marta Muss all requests for admission clinical reviews, approved or denied determinations and any other requests to dedicated fax number below belonging to the campus where the patient is receiving treatment   List of dedicated fax numbers for the Facilities:  [address] DENIALS (Administrative/Medical Necessity) [phone]   [address] (Maternity/NICU/Pediatrics) [phone]   [address] Wally Sykes 5298 (  [address] "[address]) [address] [place] [address] Radha Mario Gallagher [address] [place] [address] [phone]

## 2021-01-22 NOTE — CASE MANAGEMENT
Patient currently on 1:1 due to [facility] with psych consult pending at this time  CM department to continue following

## 2021-01-22 NOTE — ASSESSMENT & PLAN NOTE
Now normal sinus rhythm  Rate controlled on Lopressor and Cardizem  The Cardizem currently on hold due to low blood pressures  Patient is on Lovenox 40 mg subcu daily at home, currently 1mg/kg b i d   Dosing

## 2021-01-22 NOTE — TREATMENT PLAN
Called by RN due to patient saying he doesn't want to be here anymore  I called the patient on his room phone and he expressed to me essentially the same, however he was very tangential  He was rambling about people taking his money away, telling his wife to get , and told me to "call Natalie Reaves so you can give me the injection"  I discussed that I understood he probably did not feel well, but it appears that he is on multiple medications and is only on 2 L O2  Patient disagreed and said he wasn't going to be able to come home and did not want to die here  He would not disclose to me if he was suicidal and said that "is between me and no one else"  Ultimately, the patient is not actively dying and I feel that he may be passively suicidal  I will consult psych and order 1:1 observation because I do not believe that he can contract for safety at this time  Suresh Mcnair PA-C

## 2021-01-22 NOTE — UTILIZATION REVIEW
Continued Stay Review    Date: 1/22/21                           Current Patient Class: Inpatient  Current Level of Care:  Med Surg    HPI:60 y o  male initially admitted on 1/17/21 as a transfer from the [address] [place] [address] ED with COVID 19 pneumonia and respiratory failure  Initially intubated, successfully extubated to nasal cannula oxygen on the afternoon of 1/18  Infectious Disease on consult, based on CT findings on admission the patient was treated for COVID but then also started on broad-spectrum antibiotics for possible superimposed bacterial process  Antibiotics were later discontinued with negative procalcitonin  Blood cultures later returned positive and patient was re-initiated on antibiotics  Blood cultures  drawn on the 17th at [facility] with 1 of 2 sets growing 2 colony types of coag-negative staph   Repeat blood cultures were drawn on arrival to [address] [place] [address] 1 of 2 blood cultures here are growing Enterococcus faecalis and coag-negative staph  Blood culture contaminants highly suspected with 3 different colony types of CNS      1/22  Infectious Disease: No further IV vancomycin indicated as patient is Vancomycin loaded  Vancomycin Trough 50 6 > 48 8  1/19/21 repeat blood cultures remain negative at 72 hours, no further Vancomycin planned  Nephrology consulted for elevated creatinine  Patient became overtly depressed last night and was placed on 1:1 Observation with consult to [facility]  Nephrology: The patient has acute renal failure with baseline creatinine around 1 3  Patient had prior history of acute renal failure was on dialysis but has recovered  His creatinine is been slowly increasing in the issue is whether not it is related to ATN from his COVID infection overall clinical state or potentially from the contrast he received on the 17th of January  The patient had been on pressure support as well  He does have lower extremity edema as well  No hydronephrosis on CT     Okay to continue Bumex but avoid excessive diuresis unless urine output dropped significantly may require Lasix drip that would be more indicative of ATN  Avoiding potential nephrotoxins and avoid further contrast exposure  Behavorial Health: Patient has a learning disability and has been living in a nursing home since 2016 after he had a stroke  He has a poor support network consisting mostly of his wife  Patient has a history of depression well controlled on Zoloft, which patient has been unable to receive during his hospital stay  Patient feels hopeless helpless and worthless, has passive death wishes and catastrophic thoughts  Continue 1:1 for patient safety, start Zoloft 125 mg  Pertinent Labs/Diagnostic Results:       1/20 ECHO:      SUMMARY:  Limited study  No vegetations visualized      LEFT VENTRICLE:  Systolic function was normal  Ejection fraction was estimated to be 55 %  Although no diagnostic regional wall motion abnormality was identified, this possibility cannot be completely excluded on the basis of this study      AORTIC VALVE:  A bioprosthesis was present  It exhibited normal function  Mean gradient 16mm Hg      Results from last 7 days   Lab Units 01/17/21  1404   SARS-COV-2  Positive*     Results from last 7 days   Lab Units 01/22/21  0527 01/20/21  0639 01/19/21  0512 01/18/21  0421 01/17/21  1003   WBC Thousand/uL 8 75 4 79 6 54 5 22 7 33   HEMOGLOBIN g/dL 12 2 11 1* 11 5* 11 9* 12 5   HEMATOCRIT % 43 8 39 5 38 9 39 8 45 4   PLATELETS Thousands/uL 119* 148* 158 166 181   NEUTROS ABS Thousands/µL 7 59 4 24 5 78 4 51 5 84         Results from last 7 days   Lab Units 01/22/21  0527 01/20/21  0639 01/19/21  0512 01/18/21  0421 01/17/21  1003   SODIUM mmol/L 139 135* 141 141 143   POTASSIUM mmol/L 3 4* 3 7 3 9 4 0 4 9   CHLORIDE mmol/L 104 104 104 105 104   CO2 mmol/L 28 24 27 24 33*   ANION GAP mmol/L 7 7 10 12 6   BUN mg/dL 69* 50* 47* 36* 24   CREATININE mg/dL 2 40* 1 76* 1 74* 1 50* 1 34*   EGFR ml/min/1 73sq m 28 41 41 50 57   CALCIUM mg/dL 7 9* 8 1* 8 4 8 3 8 3   MAGNESIUM mg/dL  --  2 5  --   --  2 4     Results from last 7 days   Lab Units 01/22/21  0527 01/20/21  0639 01/18/21  0421 01/17/21  1003   AST U/L 11 21 20 20   ALT U/L 31 28 20 26   ALK PHOS U/L 73 69 86 101   TOTAL PROTEIN g/dL 6 6 6 4 6 8 7 7   ALBUMIN g/dL 3 0* 2 7* 2 9* 3 3*   TOTAL BILIRUBIN mg/dL 0 32 0 30 0 53 0 20     Results from last 7 days   Lab Units 01/22/21  1345 01/20/21  0006 01/19/21  1736 01/19/21  1205 01/18/21  1224 01/18/21  0548 01/18/21  0149 01/17/21  0959   POC GLUCOSE mg/dl 128 151* 159* 151* 124 120 119 107     Results from last 7 days   Lab Units 01/22/21  0527 01/20/21  0639 01/19/21  0512 01/18/21  0421 01/17/21  1003   GLUCOSE RANDOM mg/dL 88 136 137 126 114             Results from last 7 days   Lab Units 01/17/21  1137   PH ART  7 343*   PCO2 ART mm Hg 50 5*   PO2 ART mm Hg 280 0*   HCO3 ART mmol/L 26 8   BASE EXC ART mmol/L 0 5   O2 CONTENT ART mL/dL 17 5   O2 HGB, ARTERIAL % 98 2*   ABG SOURCE  Radial, Left     Results from last 7 days   Lab Units 01/17/21  1003   PH IVONNE  7 113*   PCO2 IVONNE mm Hg 101 1*   PO2 IVONNE mm Hg 103 9*   HCO3 IVONNE mmol/L 31 6*   BASE EXC IVONNE mmol/L -0 7   O2 CONTENT IVONNE ml/dL 17 8   O2 HGB, VENOUS % 94 5*         Results from last 7 days   Lab Units 01/17/21  1714   CK TOTAL U/L 76     Results from last 7 days   Lab Units 01/17/21  1003   TROPONIN I ng/mL 0 02     Results from last 7 days   Lab Units 01/20/21  0639 01/18/21  0421 01/17/21  1714   D-DIMER QUANTITATIVE ug/ml FEU 0 81* 1 02* 1 15*     Results from last 7 days   Lab Units 01/17/21  1003   PROTIME seconds 13 4   INR  1 04   PTT seconds 33         Results from last 7 days   Lab Units 01/21/21  0533 01/20/21  0639 01/19/21  0512 01/18/21  0421 01/17/21  1003   PROCALCITONIN ng/ml 0 11 0 09 0 15 0 16 0 17     Results from last 7 days   Lab Units 01/17/21  1003   LACTIC ACID mmol/L 0 4*             Results from last 7 days   Lab Units 01/18/21  0421 01/17/21  1714   NT-PRO BNP pg/mL 1,994* 3,765*     Results from last 7 days   Lab Units 01/20/21  0639 01/18/21  0421 01/17/21  1714   FERRITIN ng/mL 28 31 21     Results from last 7 days   Lab Units 01/17/21  1714   HEP B S AG  Non-reactive   HEP C AB  Non-reactive   HEP B C IGM  Non-reactive   HEP B C TOTAL AB  Non-reactive         Results from last 7 days   Lab Units 01/20/21  0639 01/18/21  0421 01/17/21  1714   CRP mg/L 8 1* 30 0* 28 8*         Results from last 7 days   Lab Units 01/22/21  1350   CLARITY UA  Cloudy   COLOR UA  Dark Neda   SPEC GRAV UA  1 020   PH UA  5 5   GLUCOSE UA mg/dl Negative   KETONES UA mg/dl Trace*   BLOOD UA  Large*   PROTEIN UA mg/dl 100 (2+)*   NITRITE UA  Negative   BILIRUBIN UA  Negative   UROBILINOGEN UA E U /dl 0 2   LEUKOCYTES UA  Trace*   WBC UA /hpf 4-10*   RBC UA /hpf Innumerable*   BACTERIA UA /hpf Moderate*   EPITHELIAL CELLS WET PREP /hpf Occasional   MUCUS THREADS  Occasional*     Results from last 7 days   Lab Units 01/17/21  1404   INFLUENZA A PCR  Negative   INFLUENZA B PCR  Negative   RSV PCR  Negative                             Results from last 7 days   Lab Units 01/19/21  1140 01/17/21  1715 01/17/21  1003   BLOOD CULTURE  No Growth at 72 hrs  No Growth at 72 hrs  No Growth After 4 Days  Enterococcus faecalis*  Staphylococcus haemolyticus* No Growth After 5 Days    Staphylococcus hominis*  Staphylococcus epidermidis*   GRAM STAIN RESULT   --  Gram positive cocci in pairs and chains* Gram positive cocci in clusters*               Vital Signs:       Date/Time  Temp  Pulse  Resp  BP  MAP   SpO2  Calculated FIO2 (%) - Nasal Cannula  Nasal Cannula O2 Flow Rate (L/min)  O2 Device   01/22/21 1448  98 7 °F (37 1 °C)  84  24Abnormal   127/67  88  97 %  28  2 L/min  Nasal cannula   01/22/21 0700  98 3 °F (36 8 °C)  82  20  142/67    95 %      Nasal cannula   01/21/21 2105  98 3 °F (36 8 °C)  75  20  100/55  71  95 %  28  2 L/min  Nasal cannula   01/21/21 1745    80    122/58  82           01/21/21 1500  98 1 °F (36 7 °C)  79  21  92/54    95 %  28  2 L/min  Nasal cannula   01/21/21 1200            97 %  28  2 L/min  Nasal cannula   01/21/21 0700  98 3 °F (36 8 °C)  82  24Abnormal   94/53    95 %      Nasal cannula   01/20/21 2129  98 1 °F (36 7 °C)  64  18  94/52    97 %      Nasal cannula   01/20/21 1500  98 °F (36 7 °C)  68  18  127/74    96 %      Nasal cannula   01/20/21 0700  98 1 °F (36 7 °C)  64  18  130/69    95 %      Nasal cannula       Date and Time Eye Opening Best Verbal Response Best Motor Response Ashkan Coma Scale Score   01/22/21 0100 4 5 6 15   01/22/21 0000 4 5 6 15   01/21/21 2000 4 5 6 15   01/21/21 1600 4 5 6 15   01/21/21 1200 4 5 6 15   01/21/21 0800 4 5 6 15   01/20/21 2000 4 5 6 15   01/20/21 0800 4 5 6 15   01/20/21 0000 4 5 6 15   01/19/21 1930 4 5 6 15   01/19/21 0800 4 5 6 15   01/19/21 0400 4 5 6 15   01/18/21 2000 4 5 6 15   01/18/21 1600 4 4 6 14   01/18/21 1532 4 1 6 11   01/18/21 1200 3 1 6 10   01/18/21 0800 3 1 6 10   01/18/21 0400 3 1 6 10   01/18/21 0000 3 1 6 10   01/17/21 2000 3 1 6 10   01/17/21 1644 3 1 6 10   01/17/21 1000 3 4 4 11       Medications:   Scheduled Medications:        ascorbic acid, 1,000 mg, Per NG Tube, Q12H [address]  aspirin, 81 mg, Oral, Daily  atorvastatin, 40 mg, Per NG Tube, HS  bumetanide, 2 mg, Oral, Daily  chlorhexidine, 15 mL, Swish & Spit, Q12H [address]  cholecalciferol, 2,000 Units, Per NG Tube, Daily  dexamethasone, 6 mg, Intravenous, Daily  enoxaparin, 40 mg, Subcutaneous, Q24H ARACELIS  metoprolol tartrate, 50 mg, Oral, TID  zinc sulfate, 220 mg, Per NG Tube, Daily    Followed by  Ghazal Courtney ON 1/25/2021] multivitamin with iron-minerals, 15 mL, Per NG Tube, Daily  pantoprazole, 40 mg, Oral, Early Morning  [START ON 1/23/2021] sertraline, 125 mg, Oral, Daily         vancomycin (VANCOCIN) 1,750 mg in sodium chloride 0 9 % 500 mL IVPB   Dose: 15 mg/kg  Weight Dosing Info: 113 kg (Adjusted)  Freq: Every 12 hours Route: IV  Last Dose: 1,750 mg (01/21/21 0717)  Start: 01/20/21 0700 End: 01/21/21 0758    vancomycin (VANCOCIN) 2,500 mg in sodium chloride 0 9 % 500 mL IVPB   Dose: 15 mg/kg  Weight Dosing Info: 169 kg  Freq: Every 12 hours Route: IV  Start: 01/19/21 1630 End: 01/19/21 1634      vancomycin (VANCOCIN) 2,000 mg in sodium chloride 0 9 % 500 mL IVPB   Dose: 2,000 mg  Freq: Every 12 hours Route: IV  Last Dose: 2,000 mg (01/18/21 0556)  Start: 01/17/21 1800 End: 01/18/21 1438    cefepime (MAXIPIME) 2,000 mg in dextrose 5 % 50 mL IVPB   Dose: 2,000 mg  Freq: Every 8 hours Route: IV  Last Dose: 2,000 mg (01/18/21 0831)  Start: 01/17/21 1730 End: 01/18/21 1438      Ordered     01/17/21 1659  Transfuse Convalescent Plasma Adult Transfusion    Complete                Continuous IV Infusions: None  PRN Meds:  acetaminophen, 650 mg, Oral, Q6H PRN  bisacodyl, 10 mg, Rectal, Daily PRN        Discharge Plan: D            Network Utilization Review Department  ATTENTION: Please call with any questions or concerns to [phone] and carefully listen to the prompts so that you are directed to the right person  All voicemails are confidential   Neptali Crystal all requests for admission clinical reviews, approved or denied determinations and any other requests to dedicated fax number below belonging to the campus where the patient is receiving treatment   List of dedicated fax numbers for the Facilities:  [address] DENIALS (Administrative/Medical Necessity) [phone]   [address] (Maternity/NICU/Pediatrics) [phone]   [address] Wally Sykes 0305 (Addi Jim "Mila" 103) [address] [facility] [phone]     [address] [place] [address] Radha Mario Gallagher 1481 [phone]   [address] 951 [phone]

## 2021-01-22 NOTE — CONSULTS
[address] [facility] 63 y o  male MRN: [id number]  Unit/Bed#: S -01 Encounter: [id number]    ASSESSMENT and PLAN:  1  Acute kidney injury:  · Creatinine 1 34 slowly increasing since admission  · Past medical history significant for dialysis dependency 2019 related to sepsis  · Contrast 1/17: CTA no hydronephrosis, simple cysts (bo in place)  · Hypotension requiring short term pressor support on 1/17  · Creatinine 1 5 --> 1 7 (1/19) -->1 7 (1/20)--> No labs-->2 4 (1/22)  · On Bumex 2 mg daily since  1/19  · Significant edema lower extremities  · Difficult to assess volume status due to body habitus  · Blood pressure remains intermittently soft/relatively low at times  · Vancomycin level >48,  1/21  Last dose of vancomycin 1/21  · Bo catheter in place appears to have mild hematuria  · Etiology:    · PATRICK likely multifactorial with COVID-19/sepsis/hypotension/relative hypotension and vancomycin induced nephrotoxicity  · No evidence of obstructive uropathy  · Plan/recommendations  · Check urinalysis  · Avoid hypotension  Blood pressure has improved but if blood pressure declines again patient may benefit from a bolus of albumin when he receives Bumex dose  · Check labs in the a m  · Avoid contrast/nephrotoxic agents  · Vancomycin on hold  2  CKD:   · Baseline creatinine 1-1 3 in 2010  · Dialysis  dependent in 2019 with subsequent recovery  · Patient tells me he follows with a nephrologist up in the [facility] area  3  COVID-19:   · Management per ID  4  Sepsis:  · Blood cultures 1 out of positive on 2 separate occasions  · Spoke with ID who feels at this time that no further antibiotics are needed  5  Chronic anemia:   · Current hemoglobin 12 2 which is slightly above baseline  6  Hypertension:    · On metoprolol tartrate 50 mg 3 times a day, loop diuretic  7  Electrolytes:  Mild hypokalemia, replete  8  Other  · AVR 2014, CHON negative for vegetation    Remote history of MSSA bacteremia      HISTORY OF PRESENT ILLNESS:  Requesting Physician: Daniel Etienne MD  Reason for Consult: PATRICK IN CKD    Konrad De Jesus is a 63 y o  male with a PMH of dialysis dependent renal failure, who was admitted to [facility] after presenting from [facility] for escalation of care in the setting of COVID-19 infection  Blood cultures were positive in 1/2 bottles on 2 separate occasions and patient was appropriately treated  Creatinine was at baseline on admission but increasing since admission and currently up to 2 4  A renal consultation is requested today for assistance in the management of acute kidney injury  The patient was seen and examined face-to-face  He has significant edema  He is expressing depression to nursing staff due to chronic illness  He has not seen his wife in a long time  He lives in a rehab facility  He cannot remember when he came off dialysis but tells me he is being followed by a nephrologist up [facility] where he lives  He is a poor historian  No acute distress  He is on nasal cannula oxygen  PAST MEDICAL HISTORY:  Past Medical History:   Diagnosis Date    Allergic rhinitis     last assessed 9/12/12    Finger fracture, right     Closed fx of the middle phalanx of the right 5th finger  last assessed 1/30/14    GI bleed 2/22/2019    Hemorrhoids     last assessed 2/10/14    Hyperlipidemia     Hypertension     Stroke [facility])        PAST SURGICAL HISTORY:  Past Surgical History:   Procedure Laterality Date    AORTIC VALVE REPLACEMENT  07/30/2014    AVR with 25mm [facility] Ease bovine pericardial valve    CARDIAC CATHETERIZATION  07/18/2014    SLB left main-normal, circumflex-normal, ramus intermedius-normal, RCA was large and dominant giving rise to the PDA & a large posterolateral branch  No disease  last assessed 8/19/14     COLONOSCOPY N/A 2/25/2019    Procedure: COLONOSCOPY;  Surgeon: Kevin Polanco DO;  [facility];   Service: Gastroenterology  ESOPHAGOGASTRODUODENOSCOPY N/A 2/22/2019    Procedure: ESOPHAGOGASTRODUODENOSCOPY (EGD)-roadshow overnight;  Surgeon: Michael Dickson DO;  Location: [facility]; Service: Gastroenterology    ESOPHAGOGASTRODUODENOSCOPY N/A 2/23/2019    Procedure: ESOPHAGOGASTRODUODENOSCOPY (EGD); Surgeon: Mario Andrade MD;  [facility]; Service: Gastroenterology    ESOPHAGOGASTRODUODENOSCOPY N/A 2/25/2019    Procedure: ESOPHAGOGASTRODUODENOSCOPY (EGD); Surgeon: Michael Dickson DO;  [facility];   Service: Gastroenterology    IR NON-TUNNELED CENTRAL LINE PLACEMENT  3/1/2019    IR NON-TUNNELED CENTRAL LINE PLACEMENT  2/4/2019    IR TUNNELED DIALYSIS CATHETER CHECK/CHANGE/REPOSITION/ANGIOPLASTY  4/18/2019    IR TUNNELED DIALYSIS CATHETER PLACEMENT  2/4/2019    IR TUNNELED DIALYSIS CATHETER PLACEMENT  2/18/2019    KNEE ARTHROSCOPY      KNEE CARTILAGE SURGERY Left     medial meniscus tear     TESTICLE SURGERY      TONSILLECTOMY AND ADENOIDECTOMY      TOOTH EXTRACTION         ALLERGIES:  Allergies   Allergen Reactions    Amiodarone      Developed Rash    Penicillins Rash     However, has subsequently tolerated Cefazolin and Cefepime       SOCIAL HISTORY:  Social History     Substance and Sexual Activity   Alcohol Use Never    Frequency: Never    Comment: ocassion /never drank alcohol per [employer]      Social History     Substance and Sexual Activity   Drug Use No     Social History     Tobacco Use   Smoking Status Never Smoker   Smokeless Tobacco Never Used       FAMILY HISTORY:  Family History   Problem Relation Age of Onset    Lung cancer Mother     Heart disease Mother         pacemaker placement     Coronary artery disease Father     Hypertension Father     Heart attack Father     Cancer Family         bladder        MEDICATIONS:    Current Facility-Administered Medications:     acetaminophen (TYLENOL) tablet 650 mg, 650 mg, Oral, Q6H PRN, Prabhjot Evans PA-C, 650 mg at 01/22/21 1159    ascorbic acid (VITAMIN C) tablet 1,000 mg, 1,000 mg, Per NG Tube, Q12H [address], Yareli Haynes PA-C, 1,000 mg at 01/22/21 1100    aspirin (ECOTRIN LOW STRENGTH) EC tablet 81 mg, 81 mg, Oral, Daily, Yareli Haynes PA-C, 81 mg at 01/22/21 1101    atorvastatin (LIPITOR) tablet 40 mg, 40 mg, Per NG Tube, HS, Yareli Haynes PA-C, 40 mg at 01/21/21 2108    bisacodyl (DULCOLAX) rectal suppository 10 mg, 10 mg, Rectal, Daily PRN, Yareli Haynes PA-C    bumetanide (BUMEX) tablet 2 mg, 2 mg, Oral, Daily, Yareli Haynes PA-C, 2 mg at 01/22/21 0900    chlorhexidine (PERIDEX) 0 12 % oral rinse 15 mL, 15 mL, Swish & Spit, Q12H [address], Yareli Haynes PA-C, 15 mL at 01/22/21 1200    cholecalciferol (VITAMIN D3) tablet 2,000 Units, 2,000 Units, Per NG Tube, Daily, Yareli Haynes PA-C, 2,000 Units at 01/22/21 1159    dexamethasone (DECADRON) injection 6 mg, 6 mg, Intravenous, Daily, Lionel Robbins PA-C, 6 mg at 01/22/21 0915    enoxaparin (LOVENOX) subcutaneous injection 40 mg, 40 mg, Subcutaneous, Q24H [address], Lionel Robbins PA-C, 40 mg at 01/22/21 0915    metoprolol tartrate (LOPRESSOR) tablet 50 mg, 50 mg, Oral, TID, Yareli Haynes PA-C, 50 mg at 01/22/21 1159    zinc sulfate (ZINCATE) capsule 220 mg, 220 mg, Per NG Tube, Daily, 220 mg at 01/22/21 0900 **FOLLOWED BY** [START ON 1/25/2021] multivitamin with iron-minerals liquid 15 mL, 15 mL, Per NG Tube, Daily, Yareli Haynes PA-C    pantoprazole (PROTONIX) EC tablet 40 mg, 40 mg, Oral, Early Morning, Yareli Haynes PA-C, 40 mg at 01/22/21 0518    vancomycin (VANCOCIN) 1,750 mg in sodium chloride 0 9 % 500 mL IVPB, 15 mg/kg (Adjusted), Intravenous, Daily PRN, Vimal Rodriguez MD    REVIEW OF SYSTEMS:  Constitutional:  Positive for fatigue  Poor appy  HENT:  No unusual congestion  Eyes: Negative for visual disturbance     Respiratory:  Short of breath with exertion  Cardiovascular:  No chest pain, chronic leg swelling  Gastrointestinal: Negative for abdominal pain, constipation, diarrhea, nausea and vomiting  Genitourinary: No dysuria, hematuria  Musculoskeletal:  No unusual myalgia or arthralgia  Skin: Negative for rash  Neurological: Negative for focal weakness  Hematological: Negative for easy bruising or bleeding  Psychiatric/Behavioral:  Depressed  All the systems were reviewed and were negative except as documented on the HPI  PHYSICAL EXAM:  Current Weight: Weight - Scale: (charted twice by mistake)  First Weight: Weight - Scale: (!) 168 kg (370 lb 6 oz)  Vitals:    01/21/21 2105 01/22/21 0535 01/22/21 0700 01/22/21 1032   BP: 100/55  142/67    BP Location: Left arm  Left arm    Pulse: 75  82    Resp: 20  20    Temp: 98 3 °F (36 8 °C)  98 3 °F (36 8 °C)    TempSrc: Oral  Oral    SpO2: 95%  95%    Weight:  (!) 182 kg (401 lb 10 9 oz)  (!) 181 kg (398 lb 13 oz)   Height:           Intake/Output Summary (Last 24 hours) at 1/22/2021 1312  Last data filed at 1/22/2021 1253  Gross per 24 hour   Intake 720 ml   Output 525 ml   Net 195 ml     Physical Exam  Constitutional:       General: He is not in acute distress  Appearance: He is well-developed  He is obese  He is ill-appearing  He is not diaphoretic  HENT:      Head: Normocephalic and atraumatic  Eyes:      General: No scleral icterus  Extraocular Movements: Extraocular movements intact  Neck:      Musculoskeletal: Normal range of motion and neck supple  Thyroid: No thyromegaly  Vascular: No JVD  Trachea: No tracheal deviation  Cardiovascular:      Rate and Rhythm: Normal rate and regular rhythm  Heart sounds: No murmur  No friction rub  No gallop  Pulmonary:      Effort: Pulmonary effort is normal  No respiratory distress  Breath sounds: No stridor  Examination of the right-lower field reveals decreased breath sounds  Examination of the left-lower field reveals decreased breath sounds   Decreased breath sounds present  No wheezing, rhonchi or rales  Abdominal:      General: Bowel sounds are normal  There is no distension  Palpations: Abdomen is soft  There is no mass  Tenderness: There is no abdominal tenderness  There is no guarding or rebound  Musculoskeletal: Normal range of motion  Right lower le+ Pitting Edema present  Left lower le+ Pitting Edema present  Skin:     General: Skin is warm and dry  Coloration: Skin is not jaundiced or pale  Findings: No erythema or rash  Neurological:      Mental Status: He is alert and oriented to person, place, and time  Psychiatric:         Attention and Perception: Attention normal          Mood and Affect: Mood is depressed  Behavior: Behavior is cooperative  Cognition and Memory: Memory is impaired  He exhibits impaired remote memory  Invasive Devices:   Urethral Catheter Straight-tip 16 Fr   (Active)   Reasons to continue Urinary Catheter  Accurate I&O assessment in critically ill patients (48 hr  max) 21 0501   Goal for Removal Remove after 48 hrs of I/O monitoring 21 0501   Site Assessment Clean;Skin intact 21 0501   Collection Container Standard drainage bag 21 0501   Securement Method Securing device (Describe) 21 0501   Irrigant Normal saline 21 1001   Urethral Irrigation Intake (mL) 20 mL 21 1001   Output (mL) 250 mL 21 0501     Lab Results:   Results from last 7 days   Lab Units 21  0527 21  0639 21  0512 21  0421  21  1003   WBC Thousand/uL 8 75 4 79 6 54 5 22  --  7 33   HEMOGLOBIN g/dL 12 2 11 1* 11 5* 11 9*  --  12 5   HEMATOCRIT % 43 8 39 5 38 9 39 8  --  45 4   PLATELETS Thousands/uL 119* 148* 158 166   < > 181   POTASSIUM mmol/L 3 4* 3 7 3 9 4 0  --  4 9   CHLORIDE mmol/L 104 104 104 105  --  104   CO2 mmol/L 28 24 27 24  --  33*   BUN mg/dL 69* 50* 47* 36*  --  24   CREATININE mg/dL 2 40* 1 76* 1 74* 1 50* --  1 34*   CALCIUM mg/dL 7 9* 8 1* 8 4 8 3  --  8 3   MAGNESIUM mg/dL  --  2 5  --   --   --  2 4   ALK PHOS U/L 73 69  --  86  --  101   ALT U/L 31 28  --  20  --  26   AST U/L 11 21  --  20  --  20    < > = values in this interval not displayed       Other Studies:

## 2021-01-22 NOTE — ASSESSMENT & PLAN NOTE
· Patient with mixed Gram-positive blood cultures  · Admission blood cultures drawn on 01/17 at [facility] w/ 1 of 2 sets growing 2 colony types of coag neg staph   · Repeat blood cultures drawn on arrival to [facility] w/ 1 of 2 growing enterococcus faecalis and coag neg staph   · Transthoracic echo negative for vegetation  · Continue IV vancomycin pending final repeat culture results  · If blood culture results from 01/19 are negative at 72 hours no further vancomycin  · Appreciated ID and okay to DC abx

## 2021-01-22 NOTE — PROGRESS NOTES
Progress Note - Infectious Disease   Phil Horne 63 y o  male MRN: [id number]  Unit/Bed#: S -01 Encounter: [id number]      Impression/Plan:  1    Mixed Gram Positive Blood cultures   Admission blood cultures were drawn on the 17th at [facility] with 1 of 2 sets growing 2 colony types of coag-negative staph   Repeat blood cultures were drawn on arrival to [address] [place] [address] 1 of 2 blood cultures here are growing Enterococcus faecalis and coag-negative staph  Blood culture contaminants highly suspected with 3 different colony types of CNS and 1 of Enterococcus in same set as CNS and known difficult IV/blood draw access  Patient has AVR 2014  TTE negative for vegetation  Vancomycin Trough 50 6 > 48 8  Rec:  · No further IV vancomycin indicated as patient is Vancomycin loaded  · 1/19/21 repeat blood cultures remain negative at 72 hours, no further Vancomycin planned  · Monitor temperature and hemodynamics  · Monitor CBC with diff  · Monitor BMP     2   Acute Respiratory Failure   Suspected hypoxic and hypercapnic related  S/p successful extubation   Appears to be multifactorial including pulmonary edema and seemingly mild COVID infection  Rec:  · Monitor respiratory status  · Monitor on O2 requirement     3   COVID-19 infection   Patient comfortably sleeping on 2 L nasal cannula O2 statting 96% on 2L  CT more consistent with pulmonary edema than ground-glass opacities   Inflammatory markers are low as below   Patient received 1st COVID vaccine on 01/08/2021  CRP 30 > 8  Ddimer 1 15 > 0 81  Ferritin 31 > 28  Rec:  · Continues mild aligorhythm with IV Decadron taper D6  · Continues Lipitor, vitamin-C vitamin-D and zinc  · Patient received convalescent plasma 01/17/2021  · No indication for further COVID specific therapy      4   PATRICK on CKD  1/22 Creatinine 2 4   Patient had been on HD a year in 2019  Rec:  · Renal dose adjust antibiotics as needed  · Discontinue vancomycin  · Monitor creatinine  · Volume management per primary care team  · Nephrology consulted     5  Morbid obesity   BMI 56 15 Increased risk factor for infection     9  XBQ 9596 and s/p remote MSSA bacteremia s/p ceftaroline 6 week course through 3/10/19  Rec:  · Follow-up repeat blood cultures  · Patient vancomycin loaded  No further vancomycin indicated as repeat blood culture remain negative at 72 hours      7  Acute Depression  Possibly multifactorial in setting of PATRICK and on IV steroids  Rec:  · Care per primary care team  · Patient on on 1 observation for safety  · Psychiatry consulted    Antibiotics:  Vancomycin D4     Above impression and plan discussed in detail with patient, RNDee, Nephrology NP, and primary care team      Subjective:  Patient became overtly depressed last night and was ordered a 1:1 observation and psych consult  today  He more awake and humorous today  He denies pain or SOB      ROS: Patient has no fever, chills, sweats overnight; no nausea, vomiting, diarrhea; no increased cough, shortness of breath; no specific pain  No new symptoms  Objective:  Vitals:  Temp:  [98 1 °F (36 7 °C)-98 3 °F (36 8 °C)] 98 3 °F (36 8 °C)  HR:  [75-82] 82  Resp:  [20-21] 20  BP: ()/(54-67) 142/67  SpO2:  [95 %] 95 %  Temp (24hrs), Av 2 °F (36 8 °C), Min:98 1 °F (36 7 °C), Max:98 3 °F (36 8 °C)  Current: Temperature: 98 3 °F (36 8 °C)    General Appearance:  Awake, alert, flat affect, resting in bed, no acute distress statting 95% on 2L NC  Throat: Oropharynx moist without lesions  Lungs:   Scattered wheeze and decreased breath sounds to auscultation bilaterally; respirations unlabored on 2L   Heart:  RRR; decreased tones   Abdomen:   Soft, non-tender, protuberant, positive bowel sounds  Extremities: + leg edema, arm IV site nontender   : Turner in place with clear, yellow urine in bag     Skin: No rashes      Labs, Imaging, & Other studies:   All pertinent labs and imaging studies were personally reviewed  Results from last 7 days   Lab Units 01/22/21  0527 01/20/21  0639 01/19/21  0512   WBC Thousand/uL 8 75 4 79 6 54   HEMOGLOBIN g/dL 12 2 11 1* 11 5*   PLATELETS Thousands/uL 119* 148* 158     Results from last 7 days   Lab Units 01/22/21  0527 01/20/21  0639  01/18/21  0421   POTASSIUM mmol/L 3 4* 3 7   < > 4 0   CHLORIDE mmol/L 104 104   < > 105   CO2 mmol/L 28 24   < > 24   BUN mg/dL 69* 50*   < > 36*   CREATININE mg/dL 2 40* 1 76*   < > 1 50*   EGFR ml/min/1 73sq m 28 41   < > 50   CALCIUM mg/dL 7 9* 8 1*   < > 8 3   AST U/L 11 21  --  20   ALT U/L 31 28  --  20   ALK PHOS U/L 73 69  --  86    < > = values in this interval not displayed  Results from last 7 days   Lab Units 01/21/21  0533 01/20/21  0639 01/19/21  0512 01/18/21  0421 01/17/21  1003   PROCALCITONIN ng/ml 0 11 0 09 0 15 0 16 0 17     Results from last 7 days   Lab Units 01/19/21  1140 01/17/21  1715 01/17/21  1003   BLOOD CULTURE  No Growth at 48 hrs  No Growth at 48 hrs  No Growth After 4 Days  Enterococcus faecalis*  Staphylococcus haemolyticus* No Growth After 4 Days    Staphylococcus hominis*  Staphylococcus epidermidis*   GRAM STAIN RESULT   --  Gram positive cocci in pairs and chains* Gram positive cocci in clusters*

## 2021-01-23 LAB
ALBUMIN SERPL BCP-MCNC: 2.6 G/DL (ref 3.5–5)
ALP SERPL-CCNC: 62 U/L (ref 46–116)
ALT SERPL W P-5'-P-CCNC: 28 U/L (ref 12–78)
ANION GAP SERPL CALCULATED.3IONS-SCNC: 8 MMOL/L (ref 4–13)
AST SERPL W P-5'-P-CCNC: 19 U/L (ref 5–45)
BACTERIA BLD CULT: NORMAL
BASO STIPL BLD QL SMEAR: PRESENT
BASOPHILS # BLD AUTO: 0 THOUSANDS/ΜL (ref 0–0.1)
BASOPHILS NFR BLD AUTO: 0 % (ref 0–1)
BILIRUB SERPL-MCNC: 0.39 MG/DL (ref 0.2–1)
BUN SERPL-MCNC: 71 MG/DL (ref 5–25)
CALCIUM ALBUM COR SERPL-MCNC: 8.8 MG/DL (ref 8.3–10.1)
CALCIUM SERPL-MCNC: 7.7 MG/DL (ref 8.3–10.1)
CHLORIDE SERPL-SCNC: 102 MMOL/L (ref 100–108)
CO2 SERPL-SCNC: 27 MMOL/L (ref 21–32)
CREAT SERPL-MCNC: 2.56 MG/DL (ref 0.6–1.3)
EOSINOPHIL # BLD AUTO: 0.01 THOUSAND/ΜL (ref 0–0.61)
EOSINOPHIL NFR BLD AUTO: 0 % (ref 0–6)
ERYTHROCYTE [DISTWIDTH] IN BLOOD BY AUTOMATED COUNT: 17.7 % (ref 11.6–15.1)
GFR SERPL CREATININE-BSD FRML MDRD: 26 ML/MIN/1.73SQ M
GLUCOSE SERPL-MCNC: 125 MG/DL (ref 65–140)
GLUCOSE SERPL-MCNC: 167 MG/DL (ref 65–140)
HCT VFR BLD AUTO: 38.4 % (ref 36.5–49.3)
HGB BLD-MCNC: 11 G/DL (ref 12–17)
IMM GRANULOCYTES # BLD AUTO: 0.07 THOUSAND/UL (ref 0–0.2)
IMM GRANULOCYTES NFR BLD AUTO: 1 % (ref 0–2)
LYMPHOCYTES # BLD AUTO: 0.19 THOUSANDS/ΜL (ref 0.6–4.47)
LYMPHOCYTES NFR BLD AUTO: 2 % (ref 14–44)
LYMPHOCYTES NFR BLD: 8 % (ref 14–44)
MCH RBC QN AUTO: 23 PG (ref 26.8–34.3)
MCHC RBC AUTO-ENTMCNC: 28.6 G/DL (ref 31.4–37.4)
MCV RBC AUTO: 80 FL (ref 82–98)
MONOCYTES # BLD AUTO: 0.55 THOUSAND/ΜL (ref 0.17–1.22)
MONOCYTES NFR BLD AUTO: 6 % (ref 4–12)
MONOCYTES NFR BLD AUTO: 8 % (ref 4–12)
NEUTROPHILS # BLD AUTO: 8.74 THOUSANDS/ΜL (ref 1.85–7.62)
NEUTS SEG NFR BLD AUTO: 84 % (ref 45–77)
NEUTS SEG NFR BLD AUTO: 91 % (ref 43–75)
NRBC BLD AUTO-RTO: 0 /100 WBCS
PLATELET # BLD AUTO: 108 THOUSANDS/UL (ref 149–390)
PLATELET BLD QL SMEAR: ADEQUATE
POTASSIUM SERPL-SCNC: 3.1 MMOL/L (ref 3.5–5.3)
PROT SERPL-MCNC: 6 G/DL (ref 6.4–8.2)
RBC # BLD AUTO: 4.78 MILLION/UL (ref 3.88–5.62)
SODIUM SERPL-SCNC: 137 MMOL/L (ref 136–145)
TOTAL CELLS COUNTED SPEC: 100
WBC # BLD AUTO: 9.56 THOUSAND/UL (ref 4.31–10.16)

## 2021-01-23 PROCEDURE — 99233 SBSQ HOSP IP/OBS HIGH 50: CPT | Performed by: INTERNAL MEDICINE

## 2021-01-23 PROCEDURE — 99232 SBSQ HOSP IP/OBS MODERATE 35: CPT | Performed by: INTERNAL MEDICINE

## 2021-01-23 PROCEDURE — 85007 BL SMEAR W/DIFF WBC COUNT: CPT | Performed by: INTERNAL MEDICINE

## 2021-01-23 PROCEDURE — 82948 REAGENT STRIP/BLOOD GLUCOSE: CPT

## 2021-01-23 PROCEDURE — 85025 COMPLETE CBC W/AUTO DIFF WBC: CPT | Performed by: INTERNAL MEDICINE

## 2021-01-23 PROCEDURE — 80053 COMPREHEN METABOLIC PANEL: CPT | Performed by: INTERNAL MEDICINE

## 2021-01-23 RX ORDER — DOCUSATE SODIUM 100 MG/1
100 CAPSULE, LIQUID FILLED ORAL 2 TIMES DAILY
Status: DISCONTINUED | OUTPATIENT
Start: 2021-01-23 | End: 2021-02-04

## 2021-01-23 RX ORDER — POTASSIUM CHLORIDE 20 MEQ/1
40 TABLET, EXTENDED RELEASE ORAL 2 TIMES DAILY
Status: COMPLETED | OUTPATIENT
Start: 2021-01-23 | End: 2021-01-24

## 2021-01-23 RX ORDER — POTASSIUM CHLORIDE 20 MEQ/1
20 TABLET, EXTENDED RELEASE ORAL ONCE
Status: COMPLETED | OUTPATIENT
Start: 2021-01-23 | End: 2021-01-23

## 2021-01-23 RX ORDER — ALPRAZOLAM 0.25 MG/1
0.25 TABLET ORAL ONCE AS NEEDED
Status: COMPLETED | OUTPATIENT
Start: 2021-01-23 | End: 2021-01-23

## 2021-01-23 RX ORDER — POLYETHYLENE GLYCOL 3350 17 G/17G
17 POWDER, FOR SOLUTION ORAL 2 TIMES DAILY
Status: DISCONTINUED | OUTPATIENT
Start: 2021-01-23 | End: 2021-02-17

## 2021-01-23 RX ORDER — TRAMADOL HYDROCHLORIDE 50 MG/1
50 TABLET ORAL ONCE
Status: DISCONTINUED | OUTPATIENT
Start: 2021-01-23 | End: 2021-02-03

## 2021-01-23 RX ADMIN — ZINC SULFATE 220 MG (50 MG) CAPSULE 220 MG: CAPSULE at 09:36

## 2021-01-23 RX ADMIN — BUMETANIDE 2 MG: 1 TABLET ORAL at 09:36

## 2021-01-23 RX ADMIN — OXYCODONE HYDROCHLORIDE AND ACETAMINOPHEN 1000 MG: 500 TABLET ORAL at 09:36

## 2021-01-23 RX ADMIN — POLYETHYLENE GLYCOL 3350 17 G: 17 POWDER, FOR SOLUTION ORAL at 22:09

## 2021-01-23 RX ADMIN — METHOCARBAMOL TABLETS 500 MG: 500 TABLET, COATED ORAL at 03:54

## 2021-01-23 RX ADMIN — Medication 2000 UNITS: at 09:36

## 2021-01-23 RX ADMIN — METHOCARBAMOL TABLETS 500 MG: 500 TABLET, COATED ORAL at 13:43

## 2021-01-23 RX ADMIN — POLYETHYLENE GLYCOL 3350 17 G: 17 POWDER, FOR SOLUTION ORAL at 09:47

## 2021-01-23 RX ADMIN — OXYCODONE HYDROCHLORIDE AND ACETAMINOPHEN 1000 MG: 500 TABLET ORAL at 22:10

## 2021-01-23 RX ADMIN — DOCUSATE SODIUM 100 MG: 100 CAPSULE, LIQUID FILLED ORAL at 17:42

## 2021-01-23 RX ADMIN — DOCUSATE SODIUM 100 MG: 100 CAPSULE, LIQUID FILLED ORAL at 09:37

## 2021-01-23 RX ADMIN — POTASSIUM CHLORIDE 40 MEQ: 1500 TABLET, EXTENDED RELEASE ORAL at 17:42

## 2021-01-23 RX ADMIN — ACETAMINOPHEN 650 MG: 325 TABLET, FILM COATED ORAL at 18:23

## 2021-01-23 RX ADMIN — POTASSIUM CHLORIDE 20 MEQ: 1500 TABLET, EXTENDED RELEASE ORAL at 13:21

## 2021-01-23 RX ADMIN — PANTOPRAZOLE SODIUM 40 MG: 40 TABLET, DELAYED RELEASE ORAL at 06:11

## 2021-01-23 RX ADMIN — CHLORHEXIDINE GLUCONATE 0.12% ORAL RINSE 15 ML: 1.2 LIQUID ORAL at 09:35

## 2021-01-23 RX ADMIN — CHLORHEXIDINE GLUCONATE 0.12% ORAL RINSE 15 ML: 1.2 LIQUID ORAL at 22:09

## 2021-01-23 RX ADMIN — ENOXAPARIN SODIUM 40 MG: 40 INJECTION SUBCUTANEOUS at 09:35

## 2021-01-23 RX ADMIN — ASPIRIN 81 MG: 81 TABLET ORAL at 09:36

## 2021-01-23 RX ADMIN — ACETAMINOPHEN 650 MG: 325 TABLET, FILM COATED ORAL at 09:35

## 2021-01-23 RX ADMIN — ACETAMINOPHEN 650 MG: 325 TABLET, FILM COATED ORAL at 01:44

## 2021-01-23 RX ADMIN — ATORVASTATIN CALCIUM 40 MG: 40 TABLET, FILM COATED ORAL at 22:10

## 2021-01-23 RX ADMIN — ALPRAZOLAM 0.25 MG: 0.25 TABLET ORAL at 01:33

## 2021-01-23 RX ADMIN — SERTRALINE 125 MG: 25 TABLET, FILM COATED ORAL at 09:36

## 2021-01-23 RX ADMIN — DEXAMETHASONE SODIUM PHOSPHATE 6 MG: 4 INJECTION INTRA-ARTICULAR; INTRALESIONAL; INTRAMUSCULAR; INTRAVENOUS; SOFT TISSUE at 09:35

## 2021-01-23 NOTE — ASSESSMENT & PLAN NOTE
· Patient presented with acute hypoxic and hypercapnic respiratory failure requiring intubation and mechanical ventilation secondary to COVID-19 pneumonia  · Patient extubated 1/18 and transferred out of ICU 1/20  · Now on 0-3 L

## 2021-01-23 NOTE — PROGRESS NOTES
NEPHROLOGY PROGRESS NOTE    Lay Catherine 63 y o  male MRN: [id number]  Unit/Bed#: S -01 Encounter: [id number]  Reason for Consult:  Acute renal failure    The patient is very depressed and anxious  According to the nurse he is also telling her that I do not want dialysis  Otherwise patient is stable from a respiratory standpoint  ASSESSMENT/PLAN:  1  Renal    Patient has acute on chronic renal insufficiency baseline creatinine is 1 3  Creatinine has been increasing and patient has COVID-19 infection  He did receive contrast on the  in the setting of COVID-19 infection so potentially he has ATN at this point creatinine is continuing to rise slightly each day  The rate of rise seems less as is only increased from 2 4-2 5 over the last 24 hours  He is on oral diuretic maintenance  Will give KCl p o  And he is on regular diet  Continue current medications  Hopefully will see renal function begin to improve and monitor volume status    2  COVID-19 infection  Treatment per protocol  SUBJECTIVE:  Review of Systems   Constitution: Positive for decreased appetite and malaise/fatigue  Negative for fever and night sweats  HENT: Negative  Eyes: Negative  Cardiovascular: Negative for chest pain, leg swelling, orthopnea and palpitations  Respiratory: Negative for cough, shortness of breath, sputum production and wheezing  Gastrointestinal: Negative for abdominal pain, diarrhea, nausea and vomiting  Genitourinary: Negative  Neurological: Negative for dizziness, focal weakness, headaches and light-headedness  Psychiatric/Behavioral: Positive for depression  Negative for altered mental status, hallucinations and hypervigilance         OBJECTIVE:  Current Weight: Weight - Scale: (charted twice by mistake)  Vitals:Temp (24hrs), Av 6 °F (37 6 °C), Min:98 6 °F (37 °C), Max:100 8 °F (38 2 °C)  Current: Temperature: 98 6 °F (37 °C)   Blood pressure 98/52, pulse 78, temperature 98 6 °F (37 °C), temperature source Oral, resp  rate 22, height 5' 11" (1 803 m), weight (!) 181 kg (398 lb 13 oz), SpO2 94 %  , Body mass index is 55 62 kg/m²  Intake/Output Summary (Last 24 hours) at 1/23/2021 1213  Last data filed at 1/23/2021 0820  Gross per 24 hour   Intake 1462 ml   Output 1600 ml   Net -138 ml       Physical Exam: BP 98/52 (BP Location: Left arm)   Pulse 78   Temp 98 6 °F (37 °C) (Oral)   Resp 22   Ht 5' 11" (1 803 m)   Wt (!) 181 kg (398 lb 13 oz)   SpO2 94%   BMI 55 62 kg/m²   Physical Exam  Constitutional:       General: He is not in acute distress  Appearance: He is ill-appearing  He is not diaphoretic  HENT:      Head: Normocephalic and atraumatic  Mouth/Throat:      Mouth: Mucous membranes are dry  Eyes:      General: No scleral icterus  Extraocular Movements: Extraocular movements intact  Neck:      Musculoskeletal: Normal range of motion and neck supple  Cardiovascular:      Rate and Rhythm: Normal rate and regular rhythm  Heart sounds: No friction rub  No gallop  Pulmonary:      Effort: Pulmonary effort is normal  No respiratory distress  Breath sounds: Normal breath sounds  No wheezing or rales  Abdominal:      General: Bowel sounds are normal  There is no distension  Palpations: Abdomen is soft  Tenderness: There is no abdominal tenderness  There is no rebound  Neurological:      General: No focal deficit present  Mental Status: He is alert and oriented to person, place, and time  Psychiatric:      Comments: Depressed  Anxious           Medications:    Current Facility-Administered Medications:     acetaminophen (TYLENOL) tablet 650 mg, 650 mg, Oral, Q6H PRN, Suresh Eckert PA-C, 650 mg at 01/23/21 0935    ascorbic acid (VITAMIN C) tablet 1,000 mg, 1,000 mg, Per NG Tube, Q12H [facility], Yareli Haynes PA-C, 1,000 mg at 01/23/21 0936    aspirin (ECOTRIN LOW STRENGTH) EC tablet 81 mg, 81 mg, Oral, Daily, Parisa MARROQUIN HERBERT Haynes, 81 mg at 01/23/21 0936    atorvastatin (LIPITOR) tablet 40 mg, 40 mg, Per NG Tube, HS, Yareli Haynes PA-C, 40 mg at 01/22/21 2200    bisacodyl (DULCOLAX) rectal suppository 10 mg, 10 mg, Rectal, Daily PRN, Yareli Haynes PA-C    bumetanide (BUMEX) tablet 2 mg, 2 mg, Oral, Daily, Yareli Haynes PA-C, 2 mg at 01/23/21 0936    chlorhexidine (PERIDEX) 0 12 % oral rinse 15 mL, 15 mL, Swish & Spit, Q12H [address], Yareli Haynes PA-C, 15 mL at 01/23/21 0935    cholecalciferol (VITAMIN D3) tablet 2,000 Units, 2,000 Units, Per NG Tube, Daily, Yareli Haynes PA-C, 2,000 Units at 01/23/21 0936    dexamethasone (DECADRON) injection 6 mg, 6 mg, Intravenous, Daily, Lionel Robbins PA-C, 6 mg at 01/23/21 0935    docusate sodium (COLACE) capsule 100 mg, 100 mg, Oral, BID, Belinda Officer, CRNP, 100 mg at 01/23/21 3600    enoxaparin (LOVENOX) subcutaneous injection 40 mg, 40 mg, Subcutaneous, Q24H [address], Lionel Robbins PA-C, 40 mg at 01/23/21 0935    melatonin tablet 3 mg, 3 mg, Oral, HS PRN, Belinda Officer, CRNP, 3 mg at 01/22/21 2201    methocarbamol (ROBAXIN) tablet 500 mg, 500 mg, Oral, Q8H PRN, Belinda Officer, CRNP, 500 mg at 01/23/21 0354    metoprolol tartrate (LOPRESSOR) tablet 50 mg, 50 mg, Oral, TID, Yareli Haynes PA-C, Stopped at 01/23/21 0936    zinc sulfate (ZINCATE) capsule 220 mg, 220 mg, Per NG Tube, Daily, 220 mg at 01/23/21 0936 **FOLLOWED BY** [START ON 1/25/2021] multivitamin with iron-minerals liquid 15 mL, 15 mL, Per NG Tube, Daily, Yareli Haynes PA-C    pantoprazole (PROTONIX) EC tablet 40 mg, 40 mg, Oral, Early Morning, Yareli Haynes PA-C, 40 mg at 01/23/21 4161    polyethylene glycol (MIRALAX) packet 17 g, 17 g, Oral, BID, Belinda Officer, KATHY, 17 g at 01/23/21 1871    sertraline (ZOLOFT) tablet 125 mg, 125 mg, Oral, Daily, Yoon Minor MD, 125 mg at 01/23/21 7971    Laboratory Results:  Lab Results   Component Value Date    WBC 9 56 01/23/2021    HGB 11 0 (L) 01/23/2021    HCT 38 4 01/23/2021    MCV 80 (L) 01/23/2021     (L) 01/23/2021     Lab Results   Component Value Date    SODIUM 137 01/23/2021    K 3 1 (L) 01/23/2021     01/23/2021    CO2 27 01/23/2021    BUN 71 (H) 01/23/2021    CREATININE 2 56 (H) 01/23/2021    GLUC 125 01/23/2021    CALCIUM 7 7 (L) 01/23/2021     Lab Results   Component Value Date    CALCIUM 7 7 (L) 01/23/2021    PHOS 4 3 03/05/2019     No results found for: LABPROT

## 2021-01-23 NOTE — ASSESSMENT & PLAN NOTE
Secondary to hypoxia and hypercapnia with history of stroke and residual left-sided weakness  Hypercapnia now resolved  Avoid sedating medications  Melatonin q h s  Noted thrombocytopenia and rising Cr will check blood smear for schistocyte although less likely TTP  Will follow result

## 2021-01-23 NOTE — ASSESSMENT & PLAN NOTE
· Patient with mixed Gram-positive blood cultures  · Admission blood cultures drawn on 01/17 at [facility] w/ 1 of 2 sets growing 2 colony types of coag neg staph   · Repeat blood cultures drawn on arrival to [facility] w/ 1 of 2 growing enterococcus faecalis and coag neg staph   · Transthoracic echo negative for vegetation  · Repeat blood culture results from 01/19 are negative at 72 hours no further vancomycin  · Appreciated ID

## 2021-01-23 NOTE — QUICK NOTE
Notified by nursing that patient is hitting himself, increased anxiety  Currently AOX4  Only complaining of constipation  Last documented BM 01/20  Abdominal exam benign        Continue 1:1  Received one dose xanax   Colace BID, miralax BID

## 2021-01-23 NOTE — ASSESSMENT & PLAN NOTE
Cr 2 56 from 2 40 yesterday  Baseline creatinine close to 1 3  He did receive contrast on 1/17  ? ATN renal following    Closely monitor

## 2021-01-23 NOTE — ASSESSMENT & PLAN NOTE
Now normal sinus rhythm  Rate controlled on Lopressor and Cardizem  The Cardizem currently on hold due to low blood pressures  Per last cardiology note 10/2020 Pt was not on AC due to life threatening GI bleed  Dr Henna Vargas has spoken with Pt on multiple occasions about Watchman device pt has been declining  Will continue lovenox prophylactic dose 60mg SQ Bid given his Crcl and Weight   Will follow RF and adjust accordingly

## 2021-01-23 NOTE — ASSESSMENT & PLAN NOTE
Patient confirms COVID-19 positive 01/16/2021  Initially presented to [address] [facility] [place], [place] with respiratory distress and altered mental status  While at minor skin is he was intubated due to hypercapnia and hypoxia  He became hypotensive requiring pressor therapy and was transferred to [facility] for further critical care management of severe COVID-19 infection  Patient received 1st COVID-19 vaccine 1/8  Chest CT with minimal ground-glass opacities and left lower lobe consolidation concerning for superimposed bacterial process     Procalcitonin negative and antibiotics discontinued  Received convalescent plasma 1/17  Continue vitamin cocktail, changed to once daily Decadron  Now on room air, prn 3L to maintain saturations greater than or equal to 90%

## 2021-01-23 NOTE — PROGRESS NOTES
Pt has been anxious, agitated, crying out for help and asking to die the past five hours  Pt alternates between stating his pain is 10/10 in his back, mid/lower, stating he cannot "pee", (pt has bo which is draining well), concerned he isn't able to pass BM  SLIM contacted several times to address, SLIM visited patient bedside  Pt is able to state name, , where he is and month/year  Not able to state why he is in hospital   Patient administrated Tramadol at 2331, Xanax at 0133, Tylenol at 0144 (pt had fever of 100 8) and now was administered Robaxin at 0400  While the patients temp has come down, patients pain has not diminished  Any medications for pain have been ineffective  Patient has been repositioned at least once every hour to attempt to alleviate pain  Will be passed on to next shift that maybe pain needs, as well as anxiety needs need to be revisited  Patient may benefit from a mattress that will accommodate his body with better weight shifting needs  Will continue to monitor patient and his needs

## 2021-01-23 NOTE — PLAN OF CARE
Problem: Prexisting or High Potential for Compromised Skin Integrity  Goal: Skin integrity is maintained or improved  Description: INTERVENTIONS:  - Identify patients at risk for skin breakdown  - Assess and monitor skin integrity  - Assess and monitor nutrition and hydration status  - Monitor labs   - Assess for incontinence   - Turn and reposition patient  - Assist with mobility/ambulation  - Relieve pressure over bony prominences  - Avoid friction and shearing  - Provide appropriate hygiene as needed including keeping skin clean and dry  - Evaluate need for skin moisturizer/barrier cream  - Collaborate with interdisciplinary team   - Patient/family teaching  - Consider wound care consult   Outcome: Progressing     Problem: Potential for Falls  Goal: Patient will remain free of falls  Description: INTERVENTIONS:  - Assess patient frequently for physical needs  -  Identify cognitive and physical deficits and behaviors that affect risk of falls    -  [place] fall precautions as indicated by assessment   - Educate patient/family on patient safety including physical limitations  - Instruct patient to call for assistance with activity based on assessment  - Modify environment to reduce risk of injury  - Consider OT/PT consult to assist with strengthening/mobility  Outcome: Progressing     Problem: SAFETY,RESTRAINT: NV/NON-SELF DESTRUCTIVE BEHAVIOR  Goal: Remains free of harm/injury (restraint for non violent/non self-detsructive behavior)  Description: INTERVENTIONS:  - Instruct patient/family regarding restraint use   - Assess and monitor physiologic and psychological status   - Provide interventions and comfort measures to meet assessed patient needs   - Identify and implement measures to help patient regain control  - Assess readiness for release of restraint   Outcome: Progressing  Goal: Returns to optimal restraint-free functioning  Description: INTERVENTIONS:  - Assess the patient's behavior and symptoms that indicate continued need for restraint  - Identify and implement measures to help patient regain control  - Assess readiness for release of restraint   Outcome: Progressing     Problem: Nutrition/Hydration-ADULT  Goal: Nutrient/Hydration intake appropriate for improving, restoring or maintaining nutritional needs  Description: Monitor and assess patient's nutrition/hydration status for malnutrition  Collaborate with interdisciplinary team and initiate plan and interventions as ordered  Monitor patient's weight and dietary intake as ordered or per policy  Utilize nutrition screening tool and intervene as necessary  Determine patient's food preferences and provide high-protein, high-caloric foods as appropriate       INTERVENTIONS:  - Monitor oral intake, urinary output, labs, and treatment plans  - Assess nutrition and hydration status and recommend course of action  - Evaluate amount of meals eaten  - Assist patient with eating if necessary   - Allow adequate time for meals  - Recommend/ encourage appropriate diets, oral nutritional supplements, and vitamin/mineral supplements  - Order, calculate, and assess calorie counts as needed  - Recommend, monitor, and adjust tube feedings and TPN/PPN based on assessed needs  - Assess need for intravenous fluids  - Provide specific nutrition/hydration education as appropriate  - Include patient/family/caregiver in decisions related to nutrition  Outcome: Progressing

## 2021-01-23 NOTE — PROGRESS NOTES
Progress Note - Tobias Gallotering 1960, 63 y o  male MRN: [id number]    Unit/Bed#: S -01 Encounter: [id number]    Primary Care Provider: Von Fisher DO   Date and time admitted to hospital: 1/17/2021  4:16 PM        * Pneumonia due to COVID-19 virus  Assessment & Plan  Patient confirms COVID-19 positive 01/16/2021  Initially presented to [address] [facility] with respiratory distress and altered mental status  While at minor skin is he was intubated due to hypercapnia and hypoxia  He became hypotensive requiring pressor therapy and was transferred to [facility] for further critical care management of severe COVID-19 infection  Patient received 1st COVID-19 vaccine 1/8  Chest CT with minimal ground-glass opacities and left lower lobe consolidation concerning for superimposed bacterial process  Procalcitonin negative and antibiotics discontinued  Received convalescent plasma 1/17  Continue vitamin cocktail, changed to once daily Decadron  Now on room air, prn 3L to maintain saturations greater than or equal to 90%    PATRICK (acute kidney injury) ([facility] [address] )  Assessment & Plan  Cr 2 56 from 2 40 yesterday  Baseline creatinine close to 1 3  He did receive contrast on 1/17  ? ATN renal following  Closely monitor    Hematuria  Assessment & Plan  Appears resolved  Prn bo flushes   Lovenox prophylactic dose and monitor for tolerance    Bacteremia  Assessment & Plan  · Patient with mixed Gram-positive blood cultures  · Admission blood cultures drawn on 01/17 at [facility] w/ 1 of 2 sets growing 2 colony types of coag neg staph   · Repeat blood cultures drawn on arrival to [facility] w/ 1 of 2 growing enterococcus faecalis and coag neg staph   · Transthoracic echo negative for vegetation  · Repeat blood culture results from 01/19 are negative at 72 hours no further vancomycin  · Appreciated ID       Acute respiratory failure with hypoxia and hypercarbia (HCC)  Assessment & Plan  · Patient presented with acute hypoxic and hypercapnic respiratory failure requiring intubation and mechanical ventilation secondary to COVID-19 pneumonia  · Patient extubated 1/18 and transferred out of ICU 1/20  · Now on 0-3 L    Atrial fibrillation ([address] )  Assessment & Plan  Now normal sinus rhythm  Rate controlled on Lopressor and Cardizem  The Cardizem currently on hold due to low blood pressures  Per last cardiology note 10/2020 Pt was not on AC due to life threatening GI bleed  Dr Josi Jacobo has spoken with Pt on multiple occasions about Watchman device pt has been declining  Will continue lovenox prophylactic dose 60mg SQ Bid given his Crcl and Weight  Will follow RF and adjust accordingly    Morbid obesity   Assessment & Plan  I recommend diet, weight loss  Patient resides in nursing home and is nonambulatory    Acute metabolic encephalopathy  Assessment & Plan  Secondary to hypoxia and hypercapnia with history of stroke and residual left-sided weakness  Hypercapnia now resolved  Avoid sedating medications  Melatonin q h s  Noted thrombocytopenia and rising Cr will check blood smear for schistocyte although less likely TTP  Will follow result  VTE Pharmacologic Prophylaxis:   Pharmacologic: Enoxaparin (Lovenox)  Mechanical VTE Prophylaxis in Place: Yes    Patient Centered Rounds: I have performed bedside rounds with nursing staff today  Discussions with Specialists or Other Care Team Provider:     Education and Discussions with Family / Patient:  I attempted to call patient's wife    Time Spent for Care: 20 minutes  More than 50% of total time spent on counseling and coordination of care as described above      Current Length of Stay: 6 day(s)    Current Patient Status: Inpatient   Certification Statement: The patient will continue to require additional inpatient hospital stay due to worsening kidney function    Discharge Plan:  Not ready for discharge due to above    Code Status: Level 1 - Full Code      Subjective:   Patient seen and examined  He states that he wants to leave the hospital   Explained to the patient that given worsening kidney function he will need to stay for further treatment and monitoring    Objective:     Vitals:   Temp (24hrs), Av 8 °F (37 7 °C), Min:98 6 °F (37 °C), Max:100 8 °F (38 2 °C)    Temp:  [98 6 °F (37 °C)-100 8 °F (38 2 °C)] 98 6 °F (37 °C)  HR:  [78-90] 78  Resp:  [14-22] 22  BP: ()/(52-64) 98/52  SpO2:  [94 %-95 %] 94 %  Body mass index is 55 62 kg/m²  Input and Output Summary (last 24 hours): Intake/Output Summary (Last 24 hours) at 2021 1504  Last data filed at 2021 1326  Gross per 24 hour   Intake 1000 ml   Output 2100 ml   Net -1100 ml       Physical Exam:     Physical Exam  Constitutional:       General: He is not in acute distress  Appearance: He is not ill-appearing, toxic-appearing or diaphoretic  Eyes:      General:         Right eye: No discharge  Left eye: No discharge  Cardiovascular:      Rate and Rhythm: Rhythm irregular  Pulses: Normal pulses  Pulmonary:      Effort: Pulmonary effort is normal       Breath sounds: Decreased breath sounds present  Abdominal:      General: Bowel sounds are normal  There is no distension  Palpations: Abdomen is soft  Tenderness: There is no abdominal tenderness  Musculoskeletal:         General: Swelling present  Skin:     General: Skin is warm  Neurological:      Mental Status: He is lethargic  Comments: Easily arousable for conversations            Additional Data:     Labs:    Results from last 7 days   Lab Units 21  0450   WBC Thousand/uL 9 56   HEMOGLOBIN g/dL 11 0*   HEMATOCRIT % 38 4   PLATELETS Thousands/uL 108*   NEUTROS PCT % 91*   LYMPHS PCT % 2*   MONOS PCT % 6   EOS PCT % 0     Results from last 7 days   Lab Units 21  0450   POTASSIUM mmol/L 3 1*   CHLORIDE mmol/L 102   CO2 mmol/L 27   BUN mg/dL 71*   CREATININE mg/dL 2 56*   CALCIUM mg/dL 7 7*   ALK PHOS U/L 62   ALT U/L 28   AST U/L 19     Results from last 7 days   Lab Units 01/17/21  1003   INR  1 04         Recent Cultures (last 7 days):     Results from last 7 days   Lab Units 01/19/21  1140 01/17/21  1715 01/17/21  1003   BLOOD CULTURE  No Growth After 4 Days  No Growth After 4 Days  No Growth After 5 Days  Enterococcus faecalis*  Staphylococcus haemolyticus* No Growth After 5 Days    Staphylococcus hominis*  Staphylococcus epidermidis*   GRAM STAIN RESULT   --  Gram positive cocci in pairs and chains* Gram positive cocci in clusters*       Last 24 Hours Medication List:   Current Facility-Administered Medications   Medication Dose Route Frequency Provider Last Rate    acetaminophen  650 mg Oral Q6H PRN Suresh Marquez PA-C      ascorbic acid  1,000 mg Per NG Tube Q12H [facility] Yareli Haynes PA-C      aspirin  81 mg Oral Daily Yareli Haynes PA-C      atorvastatin  40 mg Per NG Tube HS Yareli Haynes PA-C      bisacodyl  10 mg Rectal Daily PRN Yareli Haynes PA-C      bumetanide  2 mg Oral Daily Yareli Haynes PA-C      chlorhexidine  15 mL Swish & Spit Q12H [facility] Yareli Haynes PA-C      cholecalciferol  2,000 Units Per NG Tube Daily Yareli Haynes PA-C      dexamethasone  6 mg Intravenous Daily Lionel Robbins PA-C      docusate sodium  100 mg Oral BID Tianna Salmons, CRNP      enoxaparin  40 mg Subcutaneous Q24H [facility] Lionel Robbins PA-C      melatonin  3 mg Oral HS PRN Tianna Salmons, CRNP      methocarbamol  500 mg Oral Q8H PRN Tianna Salmons, CRNP      metoprolol tartrate  50 mg Oral TID Yareli Haynes PA-C      zinc sulfate  220 mg Per NG Tube Daily Yareli Haynes PA-C      Followed by   Dawna Jin ON 1/25/2021] multivitamin with iron-minerals  15 mL Per NG Tube Daily Yareli Haynes PA-C      pantoprazole  40 mg Oral Early Morning Yareli Haynes HERBERT      polyethylene glycol  17 g Oral BID Tianna Fischers, CRNP      potassium chloride  40 mEq Oral BID Chel Chaudhari MD      sertraline  125 mg Oral Daily Jennifer Batemna MD          Today, Patient Was Seen By: Chel Chaudhari MD    ** Please Note: Dictation voice to text software may have been used in the creation of this document   **

## 2021-01-24 LAB
ANION GAP SERPL CALCULATED.3IONS-SCNC: 8 MMOL/L (ref 4–13)
ARTERIAL PATENCY WRIST A: ABNORMAL
ARTERIAL PATENCY WRIST A: YES
BACTERIA BLD CULT: NORMAL
BACTERIA BLD CULT: NORMAL
BASE EXCESS BLDA CALC-SCNC: -0.6 MMOL/L
BASE EXCESS BLDA CALC-SCNC: -1 MMOL/L (ref -2–3)
BASE EXCESS BLDA CALC-SCNC: -2 MMOL/L (ref -2–3)
BASE EXCESS BLDA CALC-SCNC: -2.2 MMOL/L
BASE EXCESS BLDA CALC-SCNC: 0.3 MMOL/L
BASOPHILS # BLD AUTO: 0.01 THOUSANDS/ΜL (ref 0–0.1)
BASOPHILS NFR BLD AUTO: 0 % (ref 0–1)
BUN SERPL-MCNC: 70 MG/DL (ref 5–25)
CALCIUM SERPL-MCNC: 8.3 MG/DL (ref 8.3–10.1)
CHLORIDE SERPL-SCNC: 105 MMOL/L (ref 100–108)
CO2 SERPL-SCNC: 24 MMOL/L (ref 21–32)
CREAT SERPL-MCNC: 2.48 MG/DL (ref 0.6–1.3)
DS:DELIVERY SYSTEM: ABNORMAL
EOSINOPHIL # BLD AUTO: 0.01 THOUSAND/ΜL (ref 0–0.61)
EOSINOPHIL NFR BLD AUTO: 0 % (ref 0–6)
ERYTHROCYTE [DISTWIDTH] IN BLOOD BY AUTOMATED COUNT: 17.7 % (ref 11.6–15.1)
FIO2 GAS DIL.REBREATH: 100 L
FIO2 GAS DIL.REBREATH: 100 L
GFR SERPL CREATININE-BSD FRML MDRD: 27 ML/MIN/1.73SQ M
GLUCOSE SERPL-MCNC: 109 MG/DL (ref 65–140)
GLUCOSE SERPL-MCNC: 109 MG/DL (ref 65–140)
GLUCOSE SERPL-MCNC: 131 MG/DL (ref 65–140)
GLUCOSE SERPL-MCNC: 99 MG/DL (ref 65–140)
HCO3 BLDA-SCNC: 26.6 MMOL/L (ref 22–28)
HCO3 BLDA-SCNC: 27.2 MMOL/L (ref 22–28)
HCO3 BLDA-SCNC: 27.4 MMOL/L (ref 22–28)
HCO3 BLDA-SCNC: 27.4 MMOL/L (ref 22–28)
HCO3 BLDA-SCNC: 30.3 MMOL/L (ref 22–28)
HCT VFR BLD AUTO: 37.8 % (ref 36.5–49.3)
HCT VFR BLD CALC: 35 % (ref 36.5–49.3)
HCT VFR BLD CALC: 38 % (ref 36.5–49.3)
HGB BLD-MCNC: 10.9 G/DL (ref 12–17)
HGB BLDA-MCNC: 11.9 G/DL (ref 12–17)
HGB BLDA-MCNC: 12.9 G/DL (ref 12–17)
IMM GRANULOCYTES # BLD AUTO: 0.07 THOUSAND/UL (ref 0–0.2)
IMM GRANULOCYTES NFR BLD AUTO: 1 % (ref 0–2)
IPAP: 18
IPAP: 18
IPAP: 20
LYMPHOCYTES # BLD AUTO: 0.19 THOUSANDS/ΜL (ref 0.6–4.47)
LYMPHOCYTES NFR BLD AUTO: 2 % (ref 14–44)
MAGNESIUM SERPL-MCNC: 2.2 MG/DL (ref 1.6–2.6)
MCH RBC QN AUTO: 22.6 PG (ref 26.8–34.3)
MCHC RBC AUTO-ENTMCNC: 28.8 G/DL (ref 31.4–37.4)
MCV RBC AUTO: 78 FL (ref 82–98)
MONOCYTES # BLD AUTO: 0.42 THOUSAND/ΜL (ref 0.17–1.22)
MONOCYTES NFR BLD AUTO: 4 % (ref 4–12)
NEUTROPHILS # BLD AUTO: 9.98 THOUSANDS/ΜL (ref 1.85–7.62)
NEUTS SEG NFR BLD AUTO: 93 % (ref 43–75)
NON VENT- BIPAP: ABNORMAL
NRBC BLD AUTO-RTO: 0 /100 WBCS
O2 CT BLDA-SCNC: 15 ML/DL (ref 16–23)
O2 CT BLDA-SCNC: 15.5 ML/DL (ref 16–23)
O2 CT BLDA-SCNC: 15.9 ML/DL (ref 16–23)
OXYHGB MFR BLDA: 92.9 % (ref 94–97)
OXYHGB MFR BLDA: 94.6 % (ref 94–97)
OXYHGB MFR BLDA: 95.3 % (ref 94–97)
PCO2 BLD: 29 MMOL/L (ref 21–32)
PCO2 BLD: 33 MMOL/L (ref 21–32)
PCO2 BLD: 60.3 MM HG (ref 36–44)
PCO2 BLD: 99.7 MM HG (ref 36–44)
PCO2 BLDA: 56 MM HG (ref 36–44)
PCO2 BLDA: 60.7 MM HG (ref 36–44)
PCO2 BLDA: 66.7 MM HG (ref 36–44)
PEEP MAX SETTING VENT: 8 CM[H2O]
PH BLD: 7.09 [PH] (ref 7.35–7.45)
PH BLD: 7.27 [PH] (ref 7.35–7.45)
PH BLDA: 7.22 [PH] (ref 7.35–7.45)
PH BLDA: 7.27 [PH] (ref 7.35–7.45)
PH BLDA: 7.31 [PH] (ref 7.35–7.45)
PLATELET # BLD AUTO: 123 THOUSANDS/UL (ref 149–390)
PO2 BLD: 152 MM HG (ref 75–129)
PO2 BLD: 173 MM HG (ref 75–129)
PO2 BLDA: 77.8 MM HG (ref 75–129)
PO2 BLDA: 77.8 MM HG (ref 75–129)
PO2 BLDA: 81.7 MM HG (ref 75–129)
POTASSIUM BLD-SCNC: 3.2 MMOL/L (ref 3.5–5.3)
POTASSIUM BLD-SCNC: 3.9 MMOL/L (ref 3.5–5.3)
POTASSIUM SERPL-SCNC: 3.4 MMOL/L (ref 3.5–5.3)
RBC # BLD AUTO: 4.82 MILLION/UL (ref 3.88–5.62)
SAMPLE SITE: ABNORMAL
SAO2 % BLD FROM PO2: 98 % (ref 60–85)
SAO2 % BLD FROM PO2: 99 % (ref 60–85)
SODIUM BLD-SCNC: 140 MMOL/L (ref 136–145)
SODIUM BLD-SCNC: 140 MMOL/L (ref 136–145)
SODIUM SERPL-SCNC: 137 MMOL/L (ref 136–145)
SPECIMEN SOURCE: ABNORMAL
VENT BIPAP FIO2: 60 %
WBC # BLD AUTO: 10.68 THOUSAND/UL (ref 4.31–10.16)

## 2021-01-24 PROCEDURE — 94762 N-INVAS EAR/PLS OXIMTRY CONT: CPT

## 2021-01-24 PROCEDURE — 82805 BLOOD GASES W/O2 SATURATION: CPT | Performed by: PHYSICIAN ASSISTANT

## 2021-01-24 PROCEDURE — 36600 WITHDRAWAL OF ARTERIAL BLOOD: CPT

## 2021-01-24 PROCEDURE — 84295 ASSAY OF SERUM SODIUM: CPT

## 2021-01-24 PROCEDURE — 82803 BLOOD GASES ANY COMBINATION: CPT

## 2021-01-24 PROCEDURE — 93005 ELECTROCARDIOGRAM TRACING: CPT

## 2021-01-24 PROCEDURE — 82805 BLOOD GASES W/O2 SATURATION: CPT | Performed by: NURSE PRACTITIONER

## 2021-01-24 PROCEDURE — 94760 N-INVAS EAR/PLS OXIMETRY 1: CPT

## 2021-01-24 PROCEDURE — 85025 COMPLETE CBC W/AUTO DIFF WBC: CPT | Performed by: INTERNAL MEDICINE

## 2021-01-24 PROCEDURE — 82948 REAGENT STRIP/BLOOD GLUCOSE: CPT

## 2021-01-24 PROCEDURE — 99232 SBSQ HOSP IP/OBS MODERATE 35: CPT | Performed by: INTERNAL MEDICINE

## 2021-01-24 PROCEDURE — 82947 ASSAY GLUCOSE BLOOD QUANT: CPT

## 2021-01-24 PROCEDURE — 83735 ASSAY OF MAGNESIUM: CPT | Performed by: INTERNAL MEDICINE

## 2021-01-24 PROCEDURE — 84132 ASSAY OF SERUM POTASSIUM: CPT

## 2021-01-24 PROCEDURE — 82805 BLOOD GASES W/O2 SATURATION: CPT | Performed by: INTERNAL MEDICINE

## 2021-01-24 PROCEDURE — 85014 HEMATOCRIT: CPT

## 2021-01-24 PROCEDURE — 80048 BASIC METABOLIC PNL TOTAL CA: CPT | Performed by: INTERNAL MEDICINE

## 2021-01-24 RX ORDER — ALBUMIN (HUMAN) 12.5 G/50ML
25 SOLUTION INTRAVENOUS ONCE
Status: COMPLETED | OUTPATIENT
Start: 2021-01-24 | End: 2021-01-24

## 2021-01-24 RX ORDER — ACETAMINOPHEN 650 MG/1
325 SUPPOSITORY RECTAL EVERY 6 HOURS PRN
Status: DISCONTINUED | OUTPATIENT
Start: 2021-01-24 | End: 2021-01-28

## 2021-01-24 RX ORDER — MAGNESIUM SULFATE HEPTAHYDRATE 40 MG/ML
2 INJECTION, SOLUTION INTRAVENOUS ONCE
Status: COMPLETED | OUTPATIENT
Start: 2021-01-24 | End: 2021-01-24

## 2021-01-24 RX ORDER — METOPROLOL TARTRATE 50 MG/1
50 TABLET, FILM COATED ORAL 3 TIMES DAILY
Status: DISCONTINUED | OUTPATIENT
Start: 2021-01-24 | End: 2021-02-02

## 2021-01-24 RX ORDER — METOPROLOL TARTRATE 5 MG/5ML
5 INJECTION INTRAVENOUS EVERY 6 HOURS PRN
Status: DISCONTINUED | OUTPATIENT
Start: 2021-01-24 | End: 2021-02-03

## 2021-01-24 RX ORDER — METOPROLOL TARTRATE 5 MG/5ML
5 INJECTION INTRAVENOUS ONCE
Status: COMPLETED | OUTPATIENT
Start: 2021-01-24 | End: 2021-01-24

## 2021-01-24 RX ORDER — DILTIAZEM HYDROCHLORIDE 90 MG/1
90 CAPSULE, EXTENDED RELEASE ORAL EVERY 12 HOURS SCHEDULED
Status: DISCONTINUED | OUTPATIENT
Start: 2021-01-24 | End: 2021-02-02

## 2021-01-24 RX ORDER — POTASSIUM CHLORIDE 20 MEQ/1
40 TABLET, EXTENDED RELEASE ORAL ONCE
Status: DISCONTINUED | OUTPATIENT
Start: 2021-01-24 | End: 2021-01-26

## 2021-01-24 RX ORDER — ACETAMINOPHEN 650 MG/1
325 SUPPOSITORY RECTAL ONCE
Status: COMPLETED | OUTPATIENT
Start: 2021-01-24 | End: 2021-01-24

## 2021-01-24 RX ORDER — POTASSIUM CHLORIDE 20 MEQ/1
20 TABLET, EXTENDED RELEASE ORAL ONCE
Status: DISCONTINUED | OUTPATIENT
Start: 2021-01-24 | End: 2021-01-24

## 2021-01-24 RX ORDER — POTASSIUM CHLORIDE 14.9 MG/ML
20 INJECTION INTRAVENOUS ONCE
Status: COMPLETED | OUTPATIENT
Start: 2021-01-24 | End: 2021-01-24

## 2021-01-24 RX ORDER — ACETAMINOPHEN 650 MG/1
650 SUPPOSITORY RECTAL EVERY 6 HOURS PRN
Status: DISCONTINUED | OUTPATIENT
Start: 2021-01-24 | End: 2021-01-24

## 2021-01-24 RX ORDER — FUROSEMIDE 10 MG/ML
40 INJECTION INTRAMUSCULAR; INTRAVENOUS ONCE
Status: COMPLETED | OUTPATIENT
Start: 2021-01-24 | End: 2021-01-24

## 2021-01-24 RX ORDER — ACETAMINOPHEN 325 MG/1
650 TABLET ORAL EVERY 6 HOURS PRN
Status: DISCONTINUED | OUTPATIENT
Start: 2021-01-24 | End: 2021-01-24

## 2021-01-24 RX ADMIN — METOPROLOL TARTRATE 50 MG: 50 TABLET, FILM COATED ORAL at 08:24

## 2021-01-24 RX ADMIN — DOCUSATE SODIUM 100 MG: 100 CAPSULE, LIQUID FILLED ORAL at 08:25

## 2021-01-24 RX ADMIN — METOPROLOL TARTRATE 5 MG: 5 INJECTION, SOLUTION INTRAVENOUS at 03:25

## 2021-01-24 RX ADMIN — ACETAMINOPHEN 325 MG: 650 SUPPOSITORY RECTAL at 05:46

## 2021-01-24 RX ADMIN — BUMETANIDE 2 MG: 1 TABLET ORAL at 08:24

## 2021-01-24 RX ADMIN — OXYCODONE HYDROCHLORIDE AND ACETAMINOPHEN 1000 MG: 500 TABLET ORAL at 08:24

## 2021-01-24 RX ADMIN — FUROSEMIDE 40 MG: 10 INJECTION, SOLUTION INTRAMUSCULAR; INTRAVENOUS at 16:08

## 2021-01-24 RX ADMIN — POLYETHYLENE GLYCOL 3350 17 G: 17 POWDER, FOR SOLUTION ORAL at 08:24

## 2021-01-24 RX ADMIN — DEXAMETHASONE SODIUM PHOSPHATE 6 MG: 4 INJECTION INTRA-ARTICULAR; INTRALESIONAL; INTRAMUSCULAR; INTRAVENOUS; SOFT TISSUE at 08:24

## 2021-01-24 RX ADMIN — MAGNESIUM SULFATE IN WATER 2 G: 40 INJECTION, SOLUTION INTRAVENOUS at 06:51

## 2021-01-24 RX ADMIN — CHLORHEXIDINE GLUCONATE 0.12% ORAL RINSE 15 ML: 1.2 LIQUID ORAL at 08:25

## 2021-01-24 RX ADMIN — Medication 2000 UNITS: at 08:24

## 2021-01-24 RX ADMIN — POTASSIUM CHLORIDE 20 MEQ: 14.9 INJECTION, SOLUTION INTRAVENOUS at 16:08

## 2021-01-24 RX ADMIN — POTASSIUM CHLORIDE 40 MEQ: 1500 TABLET, EXTENDED RELEASE ORAL at 08:24

## 2021-01-24 RX ADMIN — SERTRALINE 125 MG: 25 TABLET, FILM COATED ORAL at 08:24

## 2021-01-24 RX ADMIN — METOPROLOL TARTRATE 5 MG: 5 INJECTION INTRAVENOUS at 20:58

## 2021-01-24 RX ADMIN — ASPIRIN 81 MG: 81 TABLET ORAL at 08:24

## 2021-01-24 RX ADMIN — MAGNESIUM SULFATE HEPTAHYDRATE 2 G: 40 INJECTION, SOLUTION INTRAVENOUS at 08:23

## 2021-01-24 RX ADMIN — ALBUMIN (HUMAN) 25 G: 0.25 INJECTION, SOLUTION INTRAVENOUS at 05:59

## 2021-01-24 RX ADMIN — METOPROLOL TARTRATE 5 MG: 5 INJECTION INTRAVENOUS at 06:08

## 2021-01-24 RX ADMIN — ENOXAPARIN SODIUM 40 MG: 40 INJECTION SUBCUTANEOUS at 08:25

## 2021-01-24 RX ADMIN — ZINC SULFATE 220 MG (50 MG) CAPSULE 220 MG: CAPSULE at 08:24

## 2021-01-24 NOTE — PLAN OF CARE
Problem: Prexisting or High Potential for Compromised Skin Integrity  Goal: Skin integrity is maintained or improved  Description: INTERVENTIONS:  - Identify patients at risk for skin breakdown  - Assess and monitor skin integrity  - Assess and monitor nutrition and hydration status  - Monitor labs   - Assess for incontinence   - Turn and reposition patient  - Assist with mobility/ambulation  - Relieve pressure over bony prominences  - Avoid friction and shearing  - Provide appropriate hygiene as needed including keeping skin clean and dry  - Evaluate need for skin moisturizer/barrier cream  - Collaborate with interdisciplinary team   - Patient/family teaching  - Consider wound care consult   Outcome: Progressing     Problem: SAFETY,RESTRAINT: NV/NON-SELF DESTRUCTIVE BEHAVIOR  Goal: Remains free of harm/injury (restraint for non violent/non self-detsructive behavior)  Description: INTERVENTIONS:  - Instruct patient/family regarding restraint use   - Assess and monitor physiologic and psychological status   - Provide interventions and comfort measures to meet assessed patient needs   - Identify and implement measures to help patient regain control  - Assess readiness for release of restraint   Outcome: Progressing  Goal: Returns to optimal restraint-free functioning  Description: INTERVENTIONS:  - Assess the patient's behavior and symptoms that indicate continued need for restraint  - Identify and implement measures to help patient regain control  - Assess readiness for release of restraint   Outcome: Progressing     Problem: Nutrition/Hydration-ADULT  Goal: Nutrient/Hydration intake appropriate for improving, restoring or maintaining nutritional needs  Description: Monitor and assess patient's nutrition/hydration status for malnutrition  Collaborate with interdisciplinary team and initiate plan and interventions as ordered  Monitor patient's weight and dietary intake as ordered or per policy   Utilize nutrition screening tool and intervene as necessary  Determine patient's food preferences and provide high-protein, high-caloric foods as appropriate       INTERVENTIONS:  - Monitor oral intake, urinary output, labs, and treatment plans  - Assess nutrition and hydration status and recommend course of action  - Evaluate amount of meals eaten  - Assist patient with eating if necessary   - Allow adequate time for meals  - Recommend/ encourage appropriate diets, oral nutritional supplements, and vitamin/mineral supplements  - Order, calculate, and assess calorie counts as needed  - Recommend, monitor, and adjust tube feedings and TPN/PPN based on assessed needs  - Assess need for intravenous fluids  - Provide specific nutrition/hydration education as appropriate  - Include patient/family/caregiver in decisions related to nutrition  Outcome: Progressing

## 2021-01-24 NOTE — PROGRESS NOTES
NEPHROLOGY PROGRESS NOTE    Janell Abo 63 y o  male MRN: [id number]  Unit/Bed#: S -01 Encounter: [id number]  Reason for Consult:  Acute renal failure    To reduce risk of COVID-19 exposure patient was visualized through the window  He is on a 1:1 nursing observation as well and I spoke to the nurse about the patient  Patient has really no distress but he is extremely depressed and suicidal and tells him that he does not want to live  No other medical changes overnight  ASSESSMENT/PLAN:  1  Renal    Patient developed acute renal failure with baseline creatinine of 1 3  Creatinine is slightly lower today so hopefully is entering recovery phase  He did receive IV contrast on the  but also has COVID-19 infection so these factors may have contributed to ATN  Urine output seems to have picked up he is on once a day diuretic  Monitor with supportive care his creatinine is starting to plateau and hopefully will enter recovery phase  2  COVID-19 infection  Treatment per protocol  3  Psychiatric  Patient on 1:1 observation severely depressed  Psychiatry has been consulted  SUBJECTIVE:  ROS     To reduce risk of COVID-19 exposure patient was visualized through the window and I spoke to the nurse taking care of him  Biggest issue is patient is depressed suicidal   He had no chest pain no shortness of breath eating a little bit  No nausea vomiting diarrhea no cough no hemoptysis  OBJECTIVE:  Current Weight: Weight - Scale: (charted twice by mistake)  Vitals:Temp (24hrs), Av 7 °F (37 6 °C), Min:98 6 °F (37 °C), Max:101 1 °F (38 4 °C)  Current: Temperature: 100 1 °F (37 8 °C)   Blood pressure 122/57, pulse (!) 118, temperature 100 1 °F (37 8 °C), temperature source Oral, resp  rate 22, height 5' 11" (1 803 m), weight (!) 181 kg (398 lb 13 oz), SpO2 97 %  , Body mass index is 55 62 kg/m²        Intake/Output Summary (Last 24 hours) at 2021 1223  Last data filed at 2021 0150  Gross per 24 hour   Intake 680 ml   Output 1900 ml   Net -1220 ml       Physical Exam: /57 (BP Location: Left arm)   Pulse (!) 118   Temp 100 1 °F (37 8 °C) (Oral)   Resp 22   Ht 5' 11" (1 803 m)   Wt (!) 181 kg (398 lb 13 oz)   SpO2 97%   BMI 55 62 kg/m²   Physical Exam  Constitutional:       General: He is not in acute distress  Appearance: He is not ill-appearing, toxic-appearing or diaphoretic  Cardiovascular:      Rate and Rhythm: Normal rate and regular rhythm  Comments: Appears have mild edema  Pulmonary:      Effort: Pulmonary effort is normal  No respiratory distress  Abdominal:      Palpations: Abdomen is soft  Tenderness: There is no abdominal tenderness  Neurological:      Comments: Unable to assess but no reported focal deficits  Psychiatric:      Comments: Very depressed  To reduce risk of COVID-19 exposure patient was visualized directly through the window and limited exam by visual observation above      Medications:    Current Facility-Administered Medications:     acetaminophen (TYLENOL) rectal suppository 325 mg, 325 mg, Rectal, Q6H PRN, Blas Spear PA-C    aspirin (ECOTRIN LOW STRENGTH) EC tablet 81 mg, 81 mg, Oral, Daily, Yareli Haynes PA-C, 81 mg at 01/24/21 0824    atorvastatin (LIPITOR) tablet 40 mg, 40 mg, Per NG Tube, HS, Yareli Haynes PA-C, 40 mg at 01/23/21 2210    bisacodyl (DULCOLAX) rectal suppository 10 mg, 10 mg, Rectal, Daily PRN, Yareli Haynes PA-C    bumetanide (BUMEX) tablet 2 mg, 2 mg, Oral, Daily, Yareli Haynes PA-C, 2 mg at 01/24/21 0824    chlorhexidine (PERIDEX) 0 12 % oral rinse 15 mL, 15 mL, Swish & Spit, Q12H [facility], Yareli Haynes PA-C, 15 mL at 01/24/21 0825    cholecalciferol (VITAMIN D3) tablet 2,000 Units, 2,000 Units, Per NG Tube, Daily, Yareli Haynes PA-C, 2,000 Units at 01/24/21 0824    dexamethasone (DECADRON) injection 6 mg, 6 mg, Intravenous, Daily, Lionel L HERBERT Robbins, 6 mg at 01/24/21 0824    docusate sodium (COLACE) capsule 100 mg, 100 mg, Oral, BID, KATHY Ann, 100 mg at 01/24/21 0825    enoxaparin (LOVENOX) subcutaneous injection 40 mg, 40 mg, Subcutaneous, Q24H [facility], Lionel KACIE Robbins PA-C, 40 mg at 01/24/21 0825    melatonin tablet 3 mg, 3 mg, Oral, HS PRN, KATHY Ann, 3 mg at 01/22/21 2201    methocarbamol (ROBAXIN) tablet 500 mg, 500 mg, Oral, Q8H PRN, KATHY Ann, 500 mg at 01/23/21 1343    metoprolol tartrate (LOPRESSOR) tablet 50 mg, 50 mg, Oral, TID, Blas Spear PA-C, 50 mg at 01/24/21 0824    [COMPLETED] zinc sulfate (ZINCATE) capsule 220 mg, 220 mg, Per NG Tube, Daily, 220 mg at 01/24/21 0824 **FOLLOWED BY** [START ON 1/25/2021] multivitamin with iron-minerals liquid 15 mL, 15 mL, Per NG Tube, Daily, Yareli Haynes PA-C    pantoprazole (PROTONIX) EC tablet 40 mg, 40 mg, Oral, Early Morning, Yareli Haynes PA-C, 40 mg at 01/23/21 0112    polyethylene glycol (MIRALAX) packet 17 g, 17 g, Oral, BID, KATHY Ann, 17 g at 01/24/21 6062    sertraline (ZOLOFT) tablet 125 mg, 125 mg, Oral, Daily, Bob Acosta MD, 125 mg at 01/24/21 6167    sodium chloride 0 9 % bolus 1,000 mL, 1,000 mL, Intravenous, Once, Blas Spear PA-C    traMADol (ULTRAM) tablet 50 mg, 50 mg, Oral, Once, KATHY Ann    Laboratory Results:  Lab Results   Component Value Date    WBC 10 68 (H) 01/24/2021    HGB 11 9 (L) 01/24/2021    HCT 35 (L) 01/24/2021    MCV 78 (L) 01/24/2021     (L) 01/24/2021     Lab Results   Component Value Date    SODIUM 137 01/24/2021    K 3 4 (L) 01/24/2021     01/24/2021    CO2 29 01/24/2021    BUN 70 (H) 01/24/2021    CREATININE 2 48 (H) 01/24/2021    GLUC 99 01/24/2021    CALCIUM 8 3 01/24/2021     Lab Results   Component Value Date    CALCIUM 8 3 01/24/2021    PHOS 4 3 03/05/2019     No results found for: LABPROT

## 2021-01-24 NOTE — PLAN OF CARE
Problem: Prexisting or High Potential for Compromised Skin Integrity  Goal: Skin integrity is maintained or improved  Description: INTERVENTIONS:  - Identify patients at risk for skin breakdown  - Assess and monitor skin integrity  - Assess and monitor nutrition and hydration status  - Monitor labs   - Assess for incontinence   - Turn and reposition patient  - Assist with mobility/ambulation  - Relieve pressure over bony prominences  - Avoid friction and shearing  - Provide appropriate hygiene as needed including keeping skin clean and dry  - Evaluate need for skin moisturizer/barrier cream  - Collaborate with interdisciplinary team   - Patient/family teaching  - Consider wound care consult   1/24/2021 1016 by Chava Mahmood RN  Outcome: Progressing  1/24/2021 1015 by Chava Mahmood RN  Outcome: Progressing     Problem: Potential for Falls  Goal: Patient will remain free of falls  Description: INTERVENTIONS:  - Assess patient frequently for physical needs  -  Identify cognitive and physical deficits and behaviors that affect risk of falls  -  [place] fall precautions as indicated by assessment   - Educate patient/family on patient safety including physical limitations  - Instruct patient to call for assistance with activity based on assessment  - Modify environment to reduce risk of injury  - Consider OT/PT consult to assist with strengthening/mobility  1/24/2021 1016 by Chava Mahmood RN  Outcome: Progressing  1/24/2021 1015 by Chava Mahmood RN  Outcome: Progressing     Problem: Nutrition/Hydration-ADULT  Goal: Nutrient/Hydration intake appropriate for improving, restoring or maintaining nutritional needs  Description: Monitor and assess patient's nutrition/hydration status for malnutrition  Collaborate with interdisciplinary team and initiate plan and interventions as ordered  Monitor patient's weight and dietary intake as ordered or per policy  Utilize nutrition screening tool and intervene as necessary  Determine patient's food preferences and provide high-protein, high-caloric foods as appropriate  INTERVENTIONS:  - Monitor oral intake, urinary output, labs, and treatment plans  - Assess nutrition and hydration status and recommend course of action  - Evaluate amount of meals eaten  - Assist patient with eating if necessary   - Allow adequate time for meals  - Recommend/ encourage appropriate diets, oral nutritional supplements, and vitamin/mineral supplements  - Order, calculate, and assess calorie counts as needed  - Recommend, monitor, and adjust tube feedings and TPN/PPN based on assessed needs  - Assess need for intravenous fluids  - Provide specific nutrition/hydration education as appropriate  - Include patient/family/caregiver in decisions related to nutrition  1/24/2021 1016 by Sheridan Jerez RN  Outcome: Progressing  1/24/2021 1015 by Sheridan Jerez RN  Outcome: Progressing     Problem: NEUROSENSORY - ADULT  Goal: Achieves stable or improved neurological status  Description: INTERVENTIONS  - Monitor and report changes in neurological status  - Monitor vital signs such as temperature, blood pressure, glucose, and any other labs ordered   - Initiate measures to prevent increased intracranial pressure  - Monitor for seizure activity and implement precautions if appropriate      Outcome: Progressing  Goal: Achieves maximal functionality and self care  Description: INTERVENTIONS  - Monitor swallowing and airway patency with patient fatigue and changes in neurological status  - Encourage and assist patient to increase activity and self care     - Encourage visually impaired, hearing impaired and aphasic patients to use assistive/communication devices  Outcome: Progressing     Problem: CARDIOVASCULAR - ADULT  Goal: Maintains optimal cardiac output and hemodynamic stability  Description: INTERVENTIONS:  - Monitor I/O, vital signs and rhythm  - Monitor for S/S and trends of decreased cardiac output  - Administer and titrate ordered vasoactive medications to optimize hemodynamic stability  - Assess quality of pulses, skin color and temperature  - Assess for signs of decreased coronary artery perfusion  - Instruct patient to report change in severity of symptoms  Outcome: Progressing  Goal: Absence of cardiac dysrhythmias or at baseline rhythm  Description: INTERVENTIONS:  - Continuous cardiac monitoring, vital signs, obtain 12 lead EKG if ordered  - Administer antiarrhythmic and heart rate control medications as ordered  - Monitor electrolytes and administer replacement therapy as ordered  Outcome: Progressing     Problem: RESPIRATORY - ADULT  Goal: Achieves optimal ventilation and oxygenation  Description: INTERVENTIONS:  - Assess for changes in respiratory status  - Assess for changes in mentation and behavior  - Position to facilitate oxygenation and minimize respiratory effort  - Oxygen administered by appropriate delivery if ordered  - Initiate smoking cessation education as indicated  - Encourage broncho-pulmonary hygiene including cough, deep breathe, Incentive Spirometry  - Assess the need for suctioning and aspirate as needed  - Assess and instruct to report SOB or any respiratory difficulty  - Respiratory Therapy support as indicated  Outcome: Progressing     Problem: GENITOURINARY - ADULT  Goal: Maintains or returns to baseline urinary function  Description: INTERVENTIONS:  - Assess urinary function  - Encourage oral fluids to ensure adequate hydration if ordered  - Administer IV fluids as ordered to ensure adequate hydration  - Administer ordered medications as needed  - Offer frequent toileting  - Follow urinary retention protocol if ordered  Outcome: Progressing  Goal: Absence of urinary retention  Description: INTERVENTIONS:  - Assess patients ability to void and empty bladder  - Monitor I/O  - Bladder scan as needed  - Discuss with physician/AP medications to alleviate retention as needed  - Discuss catheterization for long term situations as appropriate  Outcome: Progressing  Goal: Urinary catheter remains patent  Description: INTERVENTIONS:  - Assess patency of urinary catheter  - If patient has a chronic bo, consider changing catheter if non-functioning  - Follow guidelines for intermittent irrigation of non-functioning urinary catheter  Outcome: Progressing     Problem: METABOLIC, FLUID AND ELECTROLYTES - ADULT  Goal: Electrolytes maintained within normal limits  Description: INTERVENTIONS:  - Monitor labs and assess patient for signs and symptoms of electrolyte imbalances  - Administer electrolyte replacement as ordered  - Monitor response to electrolyte replacements, including repeat lab results as appropriate  - Instruct patient on fluid and nutrition as appropriate  Outcome: Progressing  Goal: Fluid balance maintained  Description: INTERVENTIONS:  - Monitor labs   - Monitor I/O and WT  - Instruct patient on fluid and nutrition as appropriate  - Assess for signs & symptoms of volume excess or deficit  Outcome: Progressing  Goal: Glucose maintained within target range  Description: INTERVENTIONS:  - Monitor Blood Glucose as ordered  - Assess for signs and symptoms of hyperglycemia and hypoglycemia  - Administer ordered medications to maintain glucose within target range  - Assess nutritional intake and initiate nutrition service referral as needed  Outcome: Progressing     Problem: MUSCULOSKELETAL - ADULT  Goal: Maintain or return mobility to safest level of function  Description: INTERVENTIONS:  - Assess patient's ability to carry out ADLs; assess patient's baseline for ADL function and identify physical deficits which impact ability to perform ADLs (bathing, care of mouth/teeth, toileting, grooming, dressing, etc )  - Assess/evaluate cause of self-care deficits   - Assess range of motion  - Assess patient's mobility  - Assess patient's need for assistive devices and provide as appropriate  - Encourage maximum independence but intervene and supervise when necessary  - Involve family in performance of ADLs  - Assess for home care needs following discharge   - Consider OT consult to assist with ADL evaluation and planning for discharge  - Provide patient education as appropriate  Outcome: Progressing  Goal: Maintain proper alignment of affected body part  Description: INTERVENTIONS:  - Support, maintain and protect limb and body alignment  - Provide patient/ family with appropriate education  Outcome: Progressing     Problem: INFECTION - ADULT  Goal: Absence or prevention of progression during hospitalization  Description: INTERVENTIONS:  - Assess and monitor for signs and symptoms of infection  - Monitor lab/diagnostic results  - Monitor all insertion sites, i e  indwelling lines, tubes, and drains  - Monitor endotracheal if appropriate and nasal secretions for changes in amount and color  - [place] appropriate cooling/warming therapies per order  - Administer medications as ordered  - Instruct and encourage patient and family to use good hand hygiene technique  - Identify and instruct in appropriate isolation precautions for identified infection/condition  Outcome: Progressing     Problem: DISCHARGE PLANNING  Goal: Discharge to home or other facility with appropriate resources  Description: INTERVENTIONS:  - Identify barriers to discharge w/patient and caregiver  - Arrange for needed discharge resources and transportation as appropriate  - Identify discharge learning needs (meds, wound care, etc )  - Arrange for interpretive services to assist at discharge as needed  - Refer to Case Management Department for coordinating discharge planning if the patient needs post-hospital services based on physician/advanced practitioner order or complex needs related to functional status, cognitive ability, or social support system  Outcome: Progressing     Problem: Knowledge Deficit  Goal: Patient/family/caregiver demonstrates understanding of disease process, treatment plan, medications, and discharge instructions  Description: Complete learning assessment and assess knowledge base    Interventions:  - Provide teaching at level of understanding  - Provide teaching via preferred learning methods  Outcome: Progressing

## 2021-01-24 NOTE — RAPID RESPONSE
Progress Note - Rapid Response   Konrad De Jesus 63 y o  male MRN: [id number]    Time Called ( Time): 1941  Date Called: 01/24/2021  Level of Care: MS  Room#: S334  Arrival Time ( Time): 0622  Event End Time ( Time): 5869  Primary reason for call: Acute change in HR  Interventions:  Airway/Breathing:  O2 Mask/Nasal  Circulation: N/A Lopressor 5 mg IV  Other Treatments: N/A       Assessment:   1  Rapid A-fib w/ RVR    Plan:   · Patient has not been getting his Cardizem 2/2 to lower BP below recommedned cut off  Acute tachycardia this am with normotensive  Given Lopressor 5 mg IV by SLIM provider and HR not controlled to the 100's  Place on tele and monitor,       HPI/Chief Complaint (Background/Situation):   Konrad De Jesus is a 63y o  year old male who presents with acute tachycardia that appeared to be wife complex and concern for V-tach  Upon review has a history of BBB and a-fib  The arrhythmia corrected with Lopressor 5 mg  Patient maintained his BP  Historical Information   Past Medical History:   Diagnosis Date    Allergic rhinitis     last assessed 9/12/12    Finger fracture, right     Closed fx of the middle phalanx of the right 5th finger  last assessed 1/30/14    GI bleed 2/22/2019    Hemorrhoids     last assessed 2/10/14    Hyperlipidemia     Hypertension     Stroke [facility])      Past Surgical History:   Procedure Laterality Date    AORTIC VALVE REPLACEMENT  07/30/2014    AVR with 25mm [facility] Ease bovine pericardial valve    CARDIAC CATHETERIZATION  07/18/2014    SLB left main-normal, circumflex-normal, ramus intermedius-normal, RCA was large and dominant giving rise to the PDA & a large posterolateral branch  No disease  last assessed 8/19/14     COLONOSCOPY N/A 2/25/2019    Procedure: COLONOSCOPY;  Surgeon: Kevin Polanco DO;  [facility];   Service: Gastroenterology    ESOPHAGOGASTRODUODENOSCOPY N/A 2/22/2019    Procedure: ESOPHAGOGASTRODUODENOSCOPY (EGD)-[facility] overnight;  Surgeon: Jono Medrano DO;  [facility]; Service: Gastroenterology    ESOPHAGOGASTRODUODENOSCOPY N/A 2/23/2019    Procedure: ESOPHAGOGASTRODUODENOSCOPY (EGD); Surgeon: Sherman Young MD;  [facility]; Service: Gastroenterology    ESOPHAGOGASTRODUODENOSCOPY N/A 2/25/2019    Procedure: ESOPHAGOGASTRODUODENOSCOPY (EGD); Surgeon: Jono Medrano DO;  [facility];   Service: Gastroenterology    IR NON-TUNNELED CENTRAL LINE PLACEMENT  3/1/2019    IR NON-TUNNELED CENTRAL LINE PLACEMENT  2/4/2019    IR TUNNELED DIALYSIS CATHETER CHECK/CHANGE/REPOSITION/ANGIOPLASTY  4/18/2019    IR TUNNELED DIALYSIS CATHETER PLACEMENT  2/4/2019    IR TUNNELED DIALYSIS CATHETER PLACEMENT  2/18/2019    KNEE ARTHROSCOPY      KNEE CARTILAGE SURGERY Left     medial meniscus tear     TESTICLE SURGERY      TONSILLECTOMY AND ADENOIDECTOMY      TOOTH EXTRACTION       Social History   Social History     Substance and Sexual Activity   Alcohol Use Never    Frequency: Never    Comment: ocassion /never drank alcohol per [employer]      Social History     Substance and Sexual Activity   Drug Use No     Social History     Tobacco Use   Smoking Status Never Smoker   Smokeless Tobacco Never Used     Family History: non-contributory    Meds/Allergies   Current Facility-Administered Medications   Medication Dose Route Frequency Provider Last Rate    acetaminophen  325 mg Rectal Q6H PRN Ida Mallory PA-C      aspirin  81 mg Oral Daily Yareli Haynes PA-C      atorvastatin  40 mg Per NG Tube HS Yareli Haynes PA-C      bisacodyl  10 mg Rectal Daily PRN Yareli Haynes PA-C      bumetanide  2 mg Oral Daily Yareli Haynes PA-C      chlorhexidine  15 mL Swish & Spit Q12H [facility] Yareli Haynes PA-C      cholecalciferol  2,000 Units Per NG Tube Daily Yareli Haynes PA-C      dexamethasone  6 mg Intravenous Daily Lionel HESTER HERBERT Robbins      diltiazem  90 mg Oral Q12H [address] Varsha Ibarra MD      docusate sodium  100 mg Oral BID Marco Hoots, CRNP      enoxaparin  40 mg Subcutaneous Q24H [address] Lionel HESTER HERBERT Robbins      melatonin  3 mg Oral HS PRN Marco Hoots, CRNP      methocarbamol  500 mg Oral Q8H PRN Marco Hoots, CRNP      metoprolol  5 mg Intravenous Q6H PRN Varsha Ibarra MD      metoprolol tartrate  50 mg Oral TID Joellen Louise PA-C      [START ON 1/25/2021] multivitamin with iron-minerals  15 mL Per NG Tube Daily Yareli Haynes PA-C      pantoprazole  40 mg Oral Early Morning Yareli Haynes PA-C      polyethylene glycol  17 g Oral BID Marco Hoots, CRNP      potassium chloride  40 mEq Oral Once Varsha Ibarra MD      potassium chloride  20 mEq Intravenous Once Varsha Ibarra MD 20 mEq (01/24/21 1608)    sertraline  125 mg Oral Daily Megan Elizabeth MD      sodium chloride  1,000 mL Intravenous Once Joellen Louise PA-C      traMADol  50 mg Oral Once Marco Hoots, CRNP              Allergies   Allergen Reactions    Amiodarone      Developed Rash    Penicillins Rash     However, has subsequently tolerated Cefazolin and Cefepime       ROS: Review of Systems   Constitutional: Negative  HENT: Negative  Eyes: Negative  Respiratory: Negative  Gastrointestinal: Negative  Genitourinary: Negative  Musculoskeletal: Negative  Skin: Negative  Psychiatric/Behavioral: Negative  All other systems reviewed and are negative  Vitals: 187- 24- 108/57- 96%     113- 22- 129/ 66- 96%    Physical Exam:  Physical Exam  Vitals signs and nursing note reviewed  Constitutional:       General: He is in acute distress  Appearance: He is obese  He is ill-appearing  HENT:      Head: Normocephalic and atraumatic  Cardiovascular:      Rate and Rhythm: Regular rhythm  Tachycardia present  Pulmonary:      Effort: Respiratory distress present  Breath sounds: Rhonchi present  Abdominal:      General: There is no distension  Palpations: Abdomen is soft  Comments: Krishna obese abd   Skin:     General: Skin is warm and dry  Capillary Refill: Capillary refill takes less than 2 seconds  Neurological:      General: No focal deficit present  Mental Status: He is alert  Intake/Output Summary (Last 24 hours) at 1/24/2021 1621  Last data filed at 1/24/2021 0150  Gross per 24 hour   Intake 680 ml   Output 1400 ml   Net -720 ml       Respiratory    Lab Data (Last 4 hours)    None         O2/Vent Data (Last 4 hours)      01/24 1430          Non-Invasive Ventilation Mode BiPAP                 Invasive Devices     Peripheral Intravenous Line            Peripheral IV 01/22/21 Proximal;Right;Ventral (anterior) Forearm 2 days    Peripheral IV 01/24/21 Left Hand less than 1 day          Drain            Urethral Catheter Straight-tip 16 Fr  7 days                DIAGNOSTIC DATA:    Lab: I have personally reviewed pertinent lab results  CBC:   Results from last 7 days   Lab Units 01/24/21  1411  01/24/21  0532   WBC Thousand/uL  --   --  10 68*   HEMOGLOBIN g/dL  --   --  10 9*   I STAT HEMOGLOBIN g/dl 12 9   < >  --    HEMATOCRIT %  --   --  37 8   HEMATOCRIT, ISTAT % 38   < >  --    PLATELETS Thousands/uL  --   --  123*    < > = values in this interval not displayed       CMP:   Results from last 7 days   Lab Units 01/24/21  1411 01/24/21  0629 01/24/21  0532 01/23/21  0450 01/22/21  0527 01/20/21  0639   POTASSIUM mmol/L  --   --  3 4* 3 1* 3 4* 3 7   CHLORIDE mmol/L  --   --  105 102 104 104   CO2 mmol/L  --   --  24 27 28 24   CO2, I-STAT mmol/L 33* 29  --   --   --   --    BUN mg/dL  --   --  70* 71* 69* 50*   CREATININE mg/dL  --   --  2 48* 2 56* 2 40* 1 76*   CALCIUM mg/dL  --   --  8 3 7 7* 7 9* 8 1*   ALK PHOS U/L  --   --   --  62 73 69   ALT U/L  --   --   --  28 31 28   AST U/L  --   --   --  19 11 21   GLUCOSE, ISTAT mg/dl 131 109  --   --   --   -- PT/INR:   No results found for: PT, INR,   Magnesium: No components found for: MAG,   Phosphorous: No results found for: PHOS    Microbiology:  Lab Results   Component Value Date    BLOODCX No Growth After 5 Days  01/19/2021    BLOODCX No Growth After 5 Days  01/19/2021    BLOODCX Enterococcus faecalis (A) 01/17/2021    BLOODCX Staphylococcus haemolyticus (A) 01/17/2021    BLOODCX No Growth After 5 Days  01/17/2021    URINECX 3661-9137 cfu/ml Gram Positive Cocci (A) 01/23/2019    SPUTUMCULTUR 1+ Growth of Staphylococcus aureus (A) 01/24/2019         OUTCOME:   Stayed in room   Family notified of transfer: no  Family member contacted: not moved  Code Status: Level 1 - Full Code  Critical Care Time: Total Critical Care time spent 0 minutes excluding procedures, teaching and family updates

## 2021-01-25 ENCOUNTER — APPOINTMENT (INPATIENT)
Dept: RADIOLOGY | Facility: HOSPITAL | Age: 61
DRG: 130 | End: 2021-01-25
Payer: COMMERCIAL

## 2021-01-25 LAB
ANION GAP SERPL CALCULATED.3IONS-SCNC: 12 MMOL/L (ref 4–13)
BASOPHILS # BLD AUTO: 0 THOUSANDS/ΜL (ref 0–0.1)
BASOPHILS NFR BLD AUTO: 0 % (ref 0–1)
BUN SERPL-MCNC: 74 MG/DL (ref 5–25)
CALCIUM SERPL-MCNC: 8.6 MG/DL (ref 8.3–10.1)
CHLORIDE SERPL-SCNC: 105 MMOL/L (ref 100–108)
CO2 SERPL-SCNC: 26 MMOL/L (ref 21–32)
CREAT SERPL-MCNC: 2.49 MG/DL (ref 0.6–1.3)
EOSINOPHIL # BLD AUTO: 0.04 THOUSAND/ΜL (ref 0–0.61)
EOSINOPHIL NFR BLD AUTO: 0 % (ref 0–6)
ERYTHROCYTE [DISTWIDTH] IN BLOOD BY AUTOMATED COUNT: 18.3 % (ref 11.6–15.1)
GFR SERPL CREATININE-BSD FRML MDRD: 27 ML/MIN/1.73SQ M
GLUCOSE SERPL-MCNC: 108 MG/DL (ref 65–140)
GLUCOSE SERPL-MCNC: 116 MG/DL (ref 65–140)
GLUCOSE SERPL-MCNC: 120 MG/DL (ref 65–140)
GLUCOSE SERPL-MCNC: 92 MG/DL (ref 65–140)
HCT VFR BLD AUTO: 37.8 % (ref 36.5–49.3)
HGB BLD-MCNC: 11.2 G/DL (ref 12–17)
IMM GRANULOCYTES # BLD AUTO: 0.06 THOUSAND/UL (ref 0–0.2)
IMM GRANULOCYTES NFR BLD AUTO: 1 % (ref 0–2)
LYMPHOCYTES # BLD AUTO: 0.17 THOUSANDS/ΜL (ref 0.6–4.47)
LYMPHOCYTES NFR BLD AUTO: 2 % (ref 14–44)
MAGNESIUM SERPL-MCNC: 2.9 MG/DL (ref 1.6–2.6)
MCH RBC QN AUTO: 23.2 PG (ref 26.8–34.3)
MCHC RBC AUTO-ENTMCNC: 29.6 G/DL (ref 31.4–37.4)
MCV RBC AUTO: 78 FL (ref 82–98)
MONOCYTES # BLD AUTO: 0.5 THOUSAND/ΜL (ref 0.17–1.22)
MONOCYTES NFR BLD AUTO: 5 % (ref 4–12)
NEUTROPHILS # BLD AUTO: 9.18 THOUSANDS/ΜL (ref 1.85–7.62)
NEUTS SEG NFR BLD AUTO: 92 % (ref 43–75)
NRBC BLD AUTO-RTO: 0 /100 WBCS
PLATELET # BLD AUTO: 148 THOUSANDS/UL (ref 149–390)
PMV BLD AUTO: 11 FL (ref 8.9–12.7)
POTASSIUM SERPL-SCNC: 3.5 MMOL/L (ref 3.5–5.3)
RBC # BLD AUTO: 4.82 MILLION/UL (ref 3.88–5.62)
SODIUM SERPL-SCNC: 143 MMOL/L (ref 136–145)
WBC # BLD AUTO: 9.95 THOUSAND/UL (ref 4.31–10.16)

## 2021-01-25 PROCEDURE — 99232 SBSQ HOSP IP/OBS MODERATE 35: CPT | Performed by: INTERNAL MEDICINE

## 2021-01-25 PROCEDURE — 85025 COMPLETE CBC W/AUTO DIFF WBC: CPT | Performed by: INTERNAL MEDICINE

## 2021-01-25 PROCEDURE — 71045 X-RAY EXAM CHEST 1 VIEW: CPT

## 2021-01-25 PROCEDURE — 82948 REAGENT STRIP/BLOOD GLUCOSE: CPT

## 2021-01-25 PROCEDURE — NC001 PR NO CHARGE: Performed by: PSYCHIATRY & NEUROLOGY

## 2021-01-25 PROCEDURE — 94762 N-INVAS EAR/PLS OXIMTRY CONT: CPT

## 2021-01-25 PROCEDURE — 83735 ASSAY OF MAGNESIUM: CPT | Performed by: INTERNAL MEDICINE

## 2021-01-25 PROCEDURE — 80048 BASIC METABOLIC PNL TOTAL CA: CPT | Performed by: INTERNAL MEDICINE

## 2021-01-25 PROCEDURE — 99233 SBSQ HOSP IP/OBS HIGH 50: CPT | Performed by: INTERNAL MEDICINE

## 2021-01-25 PROCEDURE — 94760 N-INVAS EAR/PLS OXIMETRY 1: CPT

## 2021-01-25 RX ADMIN — METOPROLOL TARTRATE 5 MG: 5 INJECTION INTRAVENOUS at 13:10

## 2021-01-25 RX ADMIN — DEXAMETHASONE SODIUM PHOSPHATE 6 MG: 4 INJECTION INTRA-ARTICULAR; INTRALESIONAL; INTRAMUSCULAR; INTRAVENOUS; SOFT TISSUE at 10:00

## 2021-01-25 RX ADMIN — ENOXAPARIN SODIUM 40 MG: 40 INJECTION SUBCUTANEOUS at 09:59

## 2021-01-25 NOTE — ASSESSMENT & PLAN NOTE
Had an episode of AFib with RVR and associated hypotension overnight with rapid response  Responded favorably to IV Lopressor  This is likely due to the fact that patient missed, his beta-blocker doses yesterday evening due to hold parameters not being met which have since been adjusted  Mag 2 2 K 3 4, maintain optimize electrolytes with Mag greater than 2 in K greater than 4  Will replete potassium continue telemetry monitoring  Rate controlled on Lopressor and Cardizem  The Cardizem currently on hold due to low blood pressures  Per last cardiology note 10/2020 Pt was not on AC due to life threatening GI bleed  Dr Hillary Jovel has spoken with Pt on multiple occasions about Watchman device pt has been declining    Will continue lovenox prophylactic 40 mg daily, would ideally need higher dosing due to weight though cannot in the setting of his renal dysfunction, will increase Lovenox as renal function improves

## 2021-01-25 NOTE — PROGRESS NOTES
REQUIRED DOCUMENTATION:     1  This service was provided via Telemedicine  2  Provider located at [facility]  3  TeleMed provider: Josefa Adame MD   4  Identify all parties in room with patient during tele consult:  1:1 technician  5  [facility], patient was identified by name and date of birth and assistant checked wristband  Patient was then informed that this was a Telemedicine visit and that the exam was being conducted confidentially over secure lines  My office door was closed  The patient was notified the following individuals were present in the room Dr Artemio Campoverde  Patient acknowledged consent and understanding of privacy and security of the Telemedicine visit, and gave us permission to have the assistant stay in the room in order to assist with the history and to conduct the exam   I informed the patient that I have reviewed their record in Epic and presented the opportunity for them to ask any questions regarding the visit today  The patient agreed to participate  Weekend events noted by chart review  Attempted to see patient for continuity of care  Patient was undergoing a midline placement at the time  Re -attempted to interview patient after had midline placed  Patient had the help of 1:1 supervising technician at bedside  Patient was awake, but tired, quickly falling asleep during our interview despite multiple prompts by 1:1    In addition, patient had Bipap mask on and our interview was limited by patient being able to answer only yes/no questions by nodding before he fell asleep  Will follow up when patient more able to participate

## 2021-01-25 NOTE — ASSESSMENT & PLAN NOTE
Patient confirms COVID-19 positive 01/16/2021  Initially presented to [address] [facility] [facility] with respiratory distress and altered mental status  While at minor skin is he was intubated due to hypercapnia and hypoxia  He became hypotensive requiring pressor therapy and was transferred to [facility] for further critical care management of severe COVID-19 infection  Patient received 1st COVID-19 vaccine 1/8  Chest CT with minimal ground-glass opacities and left lower lobe consolidation concerning for superimposed bacterial process     Procalcitonin negative and antibiotics discontinued  Received convalescent plasma 1/17  Continue vitamin cocktail, changed to once daily Decadron  Was on 6 L nasal cannula until he he was found to be obtunded  this afternoon with an acute respiratory acidosis and hypercapnic, currently now on BiPAP, see plan below

## 2021-01-25 NOTE — PLAN OF CARE
Problem: Prexisting or High Potential for Compromised Skin Integrity  Goal: Skin integrity is maintained or improved  Description: INTERVENTIONS:  - Identify patients at risk for skin breakdown  - Assess and monitor skin integrity  - Assess and monitor nutrition and hydration status  - Monitor labs   - Assess for incontinence   - Turn and reposition patient  - Assist with mobility/ambulation  - Relieve pressure over bony prominences  - Avoid friction and shearing  - Provide appropriate hygiene as needed including keeping skin clean and dry  - Evaluate need for skin moisturizer/barrier cream  - Collaborate with interdisciplinary team   - Patient/family teaching  - Consider wound care consult   Outcome: Progressing     Problem: Potential for Falls  Goal: Patient will remain free of falls  Description: INTERVENTIONS:  - Assess patient frequently for physical needs  -  Identify cognitive and physical deficits and behaviors that affect risk of falls  -  [place] fall precautions as indicated by assessment   - Educate patient/family on patient safety including physical limitations  - Instruct patient to call for assistance with activity based on assessment  - Modify environment to reduce risk of injury  - Consider OT/PT consult to assist with strengthening/mobility  Outcome: Progressing     Problem: Nutrition/Hydration-ADULT  Goal: Nutrient/Hydration intake appropriate for improving, restoring or maintaining nutritional needs  Description: Monitor and assess patient's nutrition/hydration status for malnutrition  Collaborate with interdisciplinary team and initiate plan and interventions as ordered  Monitor patient's weight and dietary intake as ordered or per policy  Utilize nutrition screening tool and intervene as necessary  Determine patient's food preferences and provide high-protein, high-caloric foods as appropriate       INTERVENTIONS:  - Monitor oral intake, urinary output, labs, and treatment plans  - Assess nutrition and hydration status and recommend course of action  - Evaluate amount of meals eaten  - Assist patient with eating if necessary   - Allow adequate time for meals  - Recommend/ encourage appropriate diets, oral nutritional supplements, and vitamin/mineral supplements  - Order, calculate, and assess calorie counts as needed  - Recommend, monitor, and adjust tube feedings and TPN/PPN based on assessed needs  - Assess need for intravenous fluids  - Provide specific nutrition/hydration education as appropriate  - Include patient/family/caregiver in decisions related to nutrition  Outcome: Progressing     Problem: NEUROSENSORY - ADULT  Goal: Achieves stable or improved neurological status  Description: INTERVENTIONS  - Monitor and report changes in neurological status  - Monitor vital signs such as temperature, blood pressure, glucose, and any other labs ordered   - Initiate measures to prevent increased intracranial pressure  - Monitor for seizure activity and implement precautions if appropriate      Outcome: Progressing  Goal: Achieves maximal functionality and self care  Description: INTERVENTIONS  - Monitor swallowing and airway patency with patient fatigue and changes in neurological status  - Encourage and assist patient to increase activity and self care     - Encourage visually impaired, hearing impaired and aphasic patients to use assistive/communication devices  Outcome: Progressing     Problem: CARDIOVASCULAR - ADULT  Goal: Maintains optimal cardiac output and hemodynamic stability  Description: INTERVENTIONS:  - Monitor I/O, vital signs and rhythm  - Monitor for S/S and trends of decreased cardiac output  - Administer and titrate ordered vasoactive medications to optimize hemodynamic stability  - Assess quality of pulses, skin color and temperature  - Assess for signs of decreased coronary artery perfusion  - Instruct patient to report change in severity of symptoms  Outcome: Progressing  Goal: Absence of cardiac dysrhythmias or at baseline rhythm  Description: INTERVENTIONS:  - Continuous cardiac monitoring, vital signs, obtain 12 lead EKG if ordered  - Administer antiarrhythmic and heart rate control medications as ordered  - Monitor electrolytes and administer replacement therapy as ordered  Outcome: Progressing     Problem: RESPIRATORY - ADULT  Goal: Achieves optimal ventilation and oxygenation  Description: INTERVENTIONS:  - Assess for changes in respiratory status  - Assess for changes in mentation and behavior  - Position to facilitate oxygenation and minimize respiratory effort  - Oxygen administered by appropriate delivery if ordered  - Initiate smoking cessation education as indicated  - Encourage broncho-pulmonary hygiene including cough, deep breathe, Incentive Spirometry  - Assess the need for suctioning and aspirate as needed  - Assess and instruct to report SOB or any respiratory difficulty  - Respiratory Therapy support as indicated  Outcome: Progressing     Problem: GENITOURINARY - ADULT  Goal: Maintains or returns to baseline urinary function  Description: INTERVENTIONS:  - Assess urinary function  - Encourage oral fluids to ensure adequate hydration if ordered  - Administer IV fluids as ordered to ensure adequate hydration  - Administer ordered medications as needed  - Offer frequent toileting  - Follow urinary retention protocol if ordered  Outcome: Progressing  Goal: Absence of urinary retention  Description: INTERVENTIONS:  - Assess patients ability to void and empty bladder  - Monitor I/O  - Bladder scan as needed  - Discuss with physician/AP medications to alleviate retention as needed  - Discuss catheterization for long term situations as appropriate  Outcome: Progressing  Goal: Urinary catheter remains patent  Description: INTERVENTIONS:  - Assess patency of urinary catheter  - If patient has a chronic bo, consider changing catheter if non-functioning  - Follow guidelines for intermittent irrigation of non-functioning urinary catheter  Outcome: Progressing     Problem: METABOLIC, FLUID AND ELECTROLYTES - ADULT  Goal: Electrolytes maintained within normal limits  Description: INTERVENTIONS:  - Monitor labs and assess patient for signs and symptoms of electrolyte imbalances  - Administer electrolyte replacement as ordered  - Monitor response to electrolyte replacements, including repeat lab results as appropriate  - Instruct patient on fluid and nutrition as appropriate  Outcome: Progressing  Goal: Fluid balance maintained  Description: INTERVENTIONS:  - Monitor labs   - Monitor I/O and WT  - Instruct patient on fluid and nutrition as appropriate  - Assess for signs & symptoms of volume excess or deficit  Outcome: Progressing  Goal: Glucose maintained within target range  Description: INTERVENTIONS:  - Monitor Blood Glucose as ordered  - Assess for signs and symptoms of hyperglycemia and hypoglycemia  - Administer ordered medications to maintain glucose within target range  - Assess nutritional intake and initiate nutrition service referral as needed  Outcome: Progressing     Problem: MUSCULOSKELETAL - ADULT  Goal: Maintain or return mobility to safest level of function  Description: INTERVENTIONS:  - Assess patient's ability to carry out ADLs; assess patient's baseline for ADL function and identify physical deficits which impact ability to perform ADLs (bathing, care of mouth/teeth, toileting, grooming, dressing, etc )  - Assess/evaluate cause of self-care deficits   - Assess range of motion  - Assess patient's mobility  - Assess patient's need for assistive devices and provide as appropriate  - Encourage maximum independence but intervene and supervise when necessary  - Involve family in performance of ADLs  - Assess for home care needs following discharge   - Consider OT consult to assist with ADL evaluation and planning for discharge  - Provide patient education as appropriate  Outcome: Progressing  Goal: Maintain proper alignment of affected body part  Description: INTERVENTIONS:  - Support, maintain and protect limb and body alignment  - Provide patient/ family with appropriate education  Outcome: Progressing     Problem: INFECTION - ADULT  Goal: Absence or prevention of progression during hospitalization  Description: INTERVENTIONS:  - Assess and monitor for signs and symptoms of infection  - Monitor lab/diagnostic results  - Monitor all insertion sites, i e  indwelling lines, tubes, and drains  - Monitor endotracheal if appropriate and nasal secretions for changes in amount and color  - [place] appropriate cooling/warming therapies per order  - Administer medications as ordered  - Instruct and encourage patient and family to use good hand hygiene technique  - Identify and instruct in appropriate isolation precautions for identified infection/condition  Outcome: Progressing     Problem: DISCHARGE PLANNING  Goal: Discharge to home or other facility with appropriate resources  Description: INTERVENTIONS:  - Identify barriers to discharge w/patient and caregiver  - Arrange for needed discharge resources and transportation as appropriate  - Identify discharge learning needs (meds, wound care, etc )  - Arrange for interpretive services to assist at discharge as needed  - Refer to Case Management Department for coordinating discharge planning if the patient needs post-hospital services based on physician/advanced practitioner order or complex needs related to functional status, cognitive ability, or social support system  Outcome: Progressing     Problem: Knowledge Deficit  Goal: Patient/family/caregiver demonstrates understanding of disease process, treatment plan, medications, and discharge instructions  Description: Complete learning assessment and assess knowledge base    Interventions:  - Provide teaching at level of understanding  - Provide teaching via preferred learning methods  Outcome: Progressing

## 2021-01-25 NOTE — ASSESSMENT & PLAN NOTE
Secondary to hypoxia and hypercapnia with history of stroke and residual left-sided weakness, patient initially was intubated at [facility] for both hypoxia and hypercapnia and was extubated in ICU here at [facility]  He has redeveloped hypercapnia and was obtunded this afternoon, improving with BiPAP  Avoid sedating medications  Melatonin q h s

## 2021-01-25 NOTE — ASSESSMENT & PLAN NOTE
Secondary to hypoxia and hypercapnia with history of stroke and residual left-sided weakness, patient initially was intubated at [facility] for both hypoxia and hypercapnia and was extubated in ICU here at [facility]  He has redeveloped hypercapnia and was obtunded this afternoon, improving with BiPAP  Avoid sedating medications  Melatonin q h s

## 2021-01-25 NOTE — ASSESSMENT & PLAN NOTE
· Patient presented with acute hypoxic and hypercapnic respiratory failure requiring intubation and mechanical ventilation secondary to COVID-19 pneumonia  · Patient extubated 1/18 and transferred out of ICU 1/20  · Obtunded this afternoon, stat ABG showed acute respiratory acidosis with hypercapnia  Patient likely also has a component of obesity hypoventilation syndrome/ANGELINA that is undiagnosed given his morbid obesity/body habitus  I did emergently discuss code status with patient's wife who reiterates patient is a full code and wants everything to be done including intubation if necessary  We discussed his significant medical comorbidities as well  Then discussed case with Critical Care given that patient remains a level 1 full code, plan is to trial BiPAP with repeat ABG in 1-2 hours, if mentation an ABG improving on BiPAP, patient can remain as step-down level to which he has been upgraded to this afternoon, if not improving with BiPAP, patient will need to be transferred to the ICU service for possible intubation  x1 Lasix 40 mg IV was given in addition to patient's p o  torsemide that he received today in an attempt to optimize respiratory status though does not appear overtly volume overloaded though I do hear some crackles bilaterally on exam   Reassess for further diuresis

## 2021-01-25 NOTE — ASSESSMENT & PLAN NOTE
· Patient with mixed Gram-positive blood cultures  · Admission blood cultures drawn on 01/17 at [facility] w/ 1 of 2 sets growing 2 colony types of coag neg staph   · Repeat blood cultures drawn on arrival to [facility] w/ 1 of 2 growing enterococcus faecalis and coag neg staph   · Transthoracic echo negative for vegetation  · Repeat blood culture results from 01/19 are negative at 72 hours no further vancomycin  · Appreciated ID

## 2021-01-25 NOTE — ASSESSMENT & PLAN NOTE
· Patient with mixed Gram-positive blood cultures  · Admission blood cultures drawn on 01/17 at [facility] w/ 1 of 2 sets growing 2 colony types of coag neg staph   · Repeat blood cultures drawn on arrival to [facility] w/ 1 of 2 growing enterococcus faecalis and coag neg staph   · Transthoracic echo negative for vegetation  · Repeat blood culture results from 01/19 are negative at 72 hours no further vancomycin  · Appreciated ID

## 2021-01-25 NOTE — ASSESSMENT & PLAN NOTE
Had an episode of AFib with RVR and associated hypotension overnight with rapid response  Responded favorably to IV Lopressor  This is likely due to the fact that patient missed, his beta-blocker doses yesterday evening due to hold parameters not being met which have since been adjusted  Mag 2 2 K 3 4, maintain optimize electrolytes with Mag greater than 2 in K greater than 4  Will replete potassium continue telemetry monitoring  Rate controlled on Lopressor and Cardizem  The Cardizem currently on hold due to low blood pressures  Per last cardiology note 10/2020 Pt was not on AC due to life threatening GI bleed  Dr Bishop Lee has spoken with Pt on multiple occasions about Watchman device pt has been declining    Will continue lovenox prophylactic 40 mg daily, would ideally need higher dosing due to weight though cannot in the setting of his renal dysfunction, will increase Lovenox as renal function improves

## 2021-01-25 NOTE — PROGRESS NOTES
Progress Note - Infectious Disease   Seda Camejo 63 y o  male MRN: [id number]  Unit/Bed#: S -01 Encounter: [id number]      Impression/Plan:  1    Mixed Gram Positive Blood cultures   Admission blood cultures were drawn on the 17th at [facility] with 1 of 2 sets growing 2 colony types of coag-negative staph   Repeat blood cultures were drawn on arrival to [address] [place] [address] 1 of 2 blood cultures here are growing Enterococcus faecalis and coag-negative staph  Blood culture contaminants highly suspected with 3 different colony types of CNS and 1 of Enterococcus in same set as CNS and known difficult IV/blood draw access   Patient has AVR 2014  TTE negative for vegetation  1/21 Vancomycin Trough 50 6 and Vancomycin Random 48 8  Rec:  · No further IV vancomycin indicated as patient's Vancomycin level was supratherapeutic  · 1/19/21 repeat blood cultures remain negative at 72 hours, no further Vancomycin planned  · Monitor temperature and hemodynamics  · Monitor CBC with diff  · Monitor BMP     2   Acute Respiratory Failure   Suspected hypoxic and hypercapnic related  S/p successful extubation   Appears to be multifactorial including pulmonary edema and seemingly mild COVID infection  Rec:  · Monitor respiratory status  · Monitor on O2 requirement     3   COVID-19 infection   Patient comfortably sleeping on 2 L nasal cannula O2 statting 96% on 2L  CT more consistent with pulmonary edema than ground-glass opacities   Inflammatory markers are low as below   Patient received 1st COVID vaccine on 01/08/2021  CRP 30 > 8  Ddimer 1 15 > 0 81  Ferritin 31 > 28  Rec:  · Continues mild aligorhythm with IV Decadron taper D9  · Continues Lipitor, MVI, vitamin-D   · Patient received convalescent plasma 01/17/2021  · No indication for further COVID specific therapy      4   PATRICK on CKD  1/25 Creatinine 2 49   Patient had been on HD a year in 2019  Rec:  · Renal dose adjust antibiotics as needed  · Check vancomycin random level  · Monitor creatinine  · Nephrology ongoing follow up     5  Morbid obesity   BMI 56 15 Increased risk factor for infection     4  MATHEW 6509 and s/p remote MSSA bacteremia s/p ceftaroline 6 week course through 3/10/19  Repeat blood cultures negative at 5 days  Rec:  · Check vancomycin random level  · Follow-up surveillance blood cultures at 1 week and 3 weeks     7  Acute Depression  Possibly multifactorial in setting of PATRICK and on IV steroids  Rec:  · Care per primary care team  · Patient on 1 to 1 observation for safety  · Psychiatry consulted     Antibiotics:  Last Vancomycin dose 21     Above impression and plan discussed in detail with patient, RN, and primary care team      Subjective:  Patient became overtly depressed Th night  He developed PATRICK and had hypercapnea issues over weekend and kept taking off Bipap  He is not easily arousable at present resting in bed on Bipap    ROS: Patient has no high fever, chills, sweats overnight; no nausea, vomiting, diarrhea; no increased cough and no specific pain complaints  Objective:  Vitals:  Temp:  [97 7 °F (36 5 °C)-99 4 °F (37 4 °C)] 99 4 °F (37 4 °C)  HR:  [] 100  Resp:  [20-24] 24  BP: (124-140)/(60-70) 130/70  SpO2:  [93 %-97 %] 93 %  Temp (24hrs), Av 6 °F (37 °C), Min:97 7 °F (36 5 °C), Max:99 4 °F (37 4 °C)  Current: Temperature: 99 4 °F (37 4 °C)    General Appearance:  Not easily arousable, resting in bed on Mask O2, statting 91% on 13 L mid flow  He is calm for 1 to 1 PCT at prsent   Throat: Oropharynx moist without lesions or parotid swelling  Lungs:   Scattered wheeze and decreased breath sounds, on midflow O2   Heart:  RRR; decreased tones   Abdomen:   Soft, non-tender, protuberant, positive bowel sounds       Extremities: + LE edema, arm IV site nontender   :  Truner with clear, yellow urine in bag   Skin: No rashes      Labs, Imaging, & Other studies:   All pertinent labs and imaging studies were personally reviewed  Results from last 7 days   Lab Units 01/25/21  0438 01/24/21  1411 01/24/21  0629 01/24/21  0532 01/23/21  0450   WBC Thousand/uL 9 95  --   --  10 68* 9 56   HEMOGLOBIN g/dL 11 2*  --   --  10 9* 11 0*   I STAT HEMOGLOBIN g/dl  --  12 9 11 9*  --   --    PLATELETS Thousands/uL 148*  --   --  123* 108*     Results from last 7 days   Lab Units 01/25/21  0438 01/24/21  1411  01/23/21  0450 01/22/21  0527 01/20/21  0639   POTASSIUM mmol/L 3 5  --    < > 3 1* 3 4* 3 7   CHLORIDE mmol/L 105  --    < > 102 104 104   CO2 mmol/L 26  --    < > 27 28 24   CO2, I-STAT mmol/L  --  33*   < >  --   --   --    BUN mg/dL 74*  --    < > 71* 69* 50*   CREATININE mg/dL 2 49*  --    < > 2 56* 2 40* 1 76*   EGFR ml/min/1 73sq m 27  --    < > 26 28 41   GLUCOSE, ISTAT mg/dl  --  131   < >  --   --   --    CALCIUM mg/dL 8 6  --    < > 7 7* 7 9* 8 1*   AST U/L  --   --   --  19 11 21   ALT U/L  --   --   --  28 31 28   ALK PHOS U/L  --   --   --  62 73 69    < > = values in this interval not displayed  Results from last 7 days   Lab Units 01/21/21  0533 01/20/21  0639 01/19/21  0512   PROCALCITONIN ng/ml 0 11 0 09 0 15     Results from last 7 days   Lab Units 01/19/21  1140   BLOOD CULTURE  No Growth After 5 Days  No Growth After 5 Days

## 2021-01-25 NOTE — ASSESSMENT & PLAN NOTE
Improving  Baseline creatinine close to 1 3  He did receive contrast on 1/17  ? ATN renal following  Closely monitor  Nephrology following  Patient was given his torsemide today in addition to IV diuretics with decompensation in his respiratory status  Monitor renal function closely  monitor need for further diuresis from a volume standpoint

## 2021-01-25 NOTE — PROGRESS NOTES
Progress Note - Rosa Collins 1960, 63 y o  male MRN: [id number]    Unit/Bed#: S -01 Encounter: [id number]    Primary Care Provider: Vianney Sauer DO   Date and time admitted to hospital: 1/17/2021  4:16 PM        PATRICK (acute kidney injury) ([facility] [address] )  Assessment & Plan  Stable plateau at 3 9-9 2  Baseline creatinine close to 1 3  He did receive contrast on 1/17  ? ATN renal following  Closely monitor  Nephrology following  Patient was given his torsemide today in addition to IV diuretics with decompensation in his respiratory status  Monitor renal function closely  monitor need for further diuresis from a volume standpoint  Hematuria  Assessment & Plan  Appears resolved  Hemoglobin stable  Continue to monitor closely  Prn bo flushes   Lovenox prophylactic dose and monitor for tolerance    Bacteremia  Assessment & Plan  · Patient with mixed Gram-positive blood cultures  · Admission blood cultures drawn on 01/17 at [facility] w/ 1 of 2 sets growing 2 colony types of coag neg staph   · Repeat blood cultures drawn on arrival to [facility] w/ 1 of 2 growing enterococcus faecalis and coag neg staph   · Transthoracic echo negative for vegetation  · Repeat blood culture results from 01/19 are negative at 72 hours no further vancomycin  · Appreciated ID   Chronic kidney disease  Assessment & Plan  Now with PATRICK   Nephrology input appreciated  Now with stable creatinine   Acute respiratory failure with hypoxia and hypercarbia [facility])  Assessment & Plan  Patient presented with acute hypoxic and hypercapnic respiratory failure requiring intubation and mechanical ventilation secondary to COVID-19 pneumonia  Patient extubated 1/18 and transferred out of ICU 1/20  Obtunded yesterday afternoon, stat ABG showed acute respiratory acidosis with hypercapnia  Patient Connie Ramírez has a component of obesity hypoventilation syndrome/ANGELINA that is undiagnosed given his morbid obesity/body habitus       Patient now more awake and will trial off BiPAP  Will consult Pulmonology for hypercapnea  Atrial fibrillation ([address] )  Assessment & Plan  Had an episode of AFib with RVR and associated hypotension overnight with rapid response  Responded favorably to IV Lopressor  This is likely due to the fact that patient missed, his beta-blocker doses yesterday evening due to hold parameters not being met which have since been adjusted  Mag 2 2 K 3 4, maintain optimize electrolytes with Mag greater than 2 in K greater than 4  Will replete potassium continue telemetry monitoring  Rate controlled on Lopressor and Cardizem  The Cardizem currently on hold due to low blood pressures  Per last cardiology note 10/2020 Pt was not on AC due to life threatening GI bleed  Dr Ara Palacios has spoken with Pt on multiple occasions about Watchman device pt has been declining  Will continue lovenox prophylactic 40 mg daily, would ideally need higher dosing due to weight though cannot in the setting of his renal dysfunction, will increase Lovenox as renal function improves    Morbid obesity   Assessment & Plan  I recommend diet, weight loss  Patient resides in nursing home and is nonambulatory    Acute metabolic encephalopathy  Assessment & Plan  Secondary to hypoxia and hypercapnia with history of stroke and residual left-sided weakness, patient initially was intubated at [facility] for both hypoxia and hypercapnia and was extubated in ICU here at [facility]  He has redeveloped hypercapnia and was obtunded this afternoon, improving with BiPAP  Avoid sedating medications  Melatonin q h s  Hyperlipidemia  Assessment & Plan  Continue statin    Essential hypertension  Assessment & Plan  Continue Lopressor, Bumex  Weaned off vasopressors  114/74    * Pneumonia due to COVID-19 virus  Assessment & Plan  Patient confirms COVID-19 positive 01/16/2021  Initially presented to [facility] [facility] with respiratory distress and altered mental status    While at minor skin is he was intubated due to hypercapnia and hypoxia  He became hypotensive requiring pressor therapy and was transferred to [facility] for further critical care management of severe COVID-19 infection  Patient received 1st COVID-19 vaccine   Chest CT with minimal ground-glass opacities and left lower lobe consolidation concerning for superimposed bacterial process  Procalcitonin negative and antibiotics discontinued  Received convalescent plasma   Continue vitamin cocktail, changed to once daily Decadron  Was on 6 L nasal cannula until he he was found to be obtunded  yesterday afternoon with an acute respiratory acidosis and hypercapnic placed on biPAP  Now more awake and will trial off bipap again   VTE Pharmacologic Prophylaxis:   Pharmacologic: Heparin  Mechanical VTE Prophylaxis in Place: Yes    Patient Centered Rounds: I have performed bedside rounds with nursing staff today  Discussions with Specialists or Other Care Team Provider: Discussed with respiratory  Education and Discussions with Family / Patient: Discussed with patient  Called Boyd Hayes updated her  Time Spent for Care: 30 minutes  More than 50% of total time spent on counseling and coordination of care as described above  Current Length of Stay: 8 day(s)    Current Patient Status: Inpatient   Certification Statement: The patient will continue to require additional inpatient hospital stay due to hypoxia and hypercapnea  Discharge Plan: Not medically stable for DC  Code Status: Level 1 - Full Code      Subjective:   Patient seen and examined  No complaints  Feeling "better"    Objective:     Vitals:   Temp (24hrs), Av 5 °F (36 9 °C), Min:97 7 °F (36 5 °C), Max:99 4 °F (37 4 °C)    Temp:  [97 7 °F (36 5 °C)-99 4 °F (37 4 °C)] 98 5 °F (36 9 °C)  HR:  [] 107  Resp:  [20-24] 20  BP: (114-140)/(60-74) 114/74  SpO2:  [88 %-96 %] 88 %  Body mass index is 55 62 kg/m²       Input and Output Summary (last 24 hours): Intake/Output Summary (Last 24 hours) at 1/25/2021 1602  Last data filed at 1/25/2021 1001  Gross per 24 hour   Intake    Output 2200 ml   Net -2200 ml       Physical Exam:     Physical Exam  Constitutional:       General: He is not in acute distress  Appearance: He is not ill-appearing (chronically  ), toxic-appearing or diaphoretic  HENT:      Head: Normocephalic  Nose: Nose normal    Eyes:      General: No scleral icterus  Right eye: No discharge  Left eye: No discharge  Pupils: Pupils are equal, round, and reactive to light  Pulmonary:      Effort: Respiratory distress (mild  ) present  Comments: Diminished breath sounds  Abdominal:      General: Abdomen is flat  There is no distension  Palpations: There is no mass  Tenderness: There is no abdominal tenderness  There is no right CVA tenderness, left CVA tenderness, guarding or rebound  Hernia: No hernia is present  Musculoskeletal:         General: No swelling, tenderness, deformity or signs of injury  Right lower leg: No edema  Left lower leg: No edema  Skin:     Capillary Refill: Capillary refill takes less than 2 seconds  Coloration: Skin is pale  Skin is not jaundiced  Findings: No bruising or lesion  Neurological:      General: No focal deficit present  Mental Status: He is alert  Cranial Nerves: No cranial nerve deficit  Sensory: No sensory deficit  Motor: No weakness        Coordination: Coordination normal       Gait: Gait normal       Deep Tendon Reflexes: Reflexes normal    Psychiatric:         Mood and Affect: Mood normal            Additional Data:     Labs:    Results from last 7 days   Lab Units 01/25/21 0432   WBC Thousand/uL 9 95   HEMOGLOBIN g/dL 11 2*   HEMATOCRIT % 37 8   PLATELETS Thousands/uL 148*   NEUTROS PCT % 92*   LYMPHS PCT % 2*   MONOS PCT % 5   EOS PCT % 0     Results from last 7 days   Lab Units 01/25/21  7552 01/23/21  0450   SODIUM mmol/L 143   < > 137   POTASSIUM mmol/L 3 5   < > 3 1*   CHLORIDE mmol/L 105   < > 102   CO2 mmol/L 26   < > 27   CO2, I-STAT   --    < >  --    BUN mg/dL 74*   < > 71*   CREATININE mg/dL 2 49*   < > 2 56*   ANION GAP mmol/L 12   < > 8   CALCIUM mg/dL 8 6   < > 7 7*   ALBUMIN g/dL  --   --  2 6*   TOTAL BILIRUBIN mg/dL  --   --  0 39   ALK PHOS U/L  --   --  62   ALT U/L  --   --  28   AST U/L  --   --  19   GLUCOSE RANDOM mg/dL 92   < > 125    < > = values in this interval not displayed  Results from last 7 days   Lab Units 01/25/21  1207 01/25/21  0612 01/24/21  0043 01/22/21  1753 01/22/21  1345 01/20/21  0006 01/19/21  1736 01/19/21  1205   POC GLUCOSE mg/dl 108 116 109 167* 128 151* 159* 151*         Results from last 7 days   Lab Units 01/21/21  0533 01/20/21  0639 01/19/21  0512   PROCALCITONIN ng/ml 0 11 0 09 0 15           * I Have Reviewed All Lab Data Listed Above  * Additional Pertinent Lab Tests Reviewed: All Labs Within Last 24 Hours Reviewed    Imaging:    Imaging Reports Reviewed Today Include:   None pertinent to this encounter  Imaging Personally Reviewed by Myself Includes:  As above    Recent Cultures (last 7 days):     Results from last 7 days   Lab Units 01/19/21  1140   BLOOD CULTURE  No Growth After 5 Days  No Growth After 5 Days         Last 24 Hours Medication List:   Current Facility-Administered Medications   Medication Dose Route Frequency Provider Last Rate    acetaminophen  325 mg Rectal Q6H PRN Renae Hill PA-C      aspirin  81 mg Oral Daily Yareli Haynes PA-C      atorvastatin  40 mg Per NG Tube HS Yareli Haynes PA-C      bisacodyl  10 mg Rectal Daily PRN Yareli Haynes PA-C      bumetanide  2 mg Oral Daily Yareli Haynes PA-C      chlorhexidine  15 mL Swish & Spit Q12H [facility] Yareli Haynes PA-C      cholecalciferol  2,000 Units Per NG Tube Daily Yareli Haynes PA-C      dexamethasone  6 mg Intravenous Daily Lionel Robbins PA-C      diltiazem  90 mg Oral Q12H [address] Zafar Reese MD      docusate sodium  100 mg Oral BID KATHY Shoemaker      enoxaparin  40 mg Subcutaneous Q24H [address] Lionel Robbins PA-C      melatonin  3 mg Oral HS PRN KATHY Shoemaker      methocarbamol  500 mg Oral Q8H PRN KATHY Shoemaker      metoprolol  5 mg Intravenous Q6H PRN Zafar Reese MD      metoprolol tartrate  50 mg Oral TID Maximino Rose PA-C      multivitamin with iron-minerals  15 mL Per NG Tube Daily Yareli Haynes PA-C      pantoprazole  40 mg Oral Early Morning Yareli Haynes PA-C      polyethylene glycol  17 g Oral BID KATHY Shoemaker      potassium chloride  40 mEq Oral Once Zafar Reese MD      sertraline  125 mg Oral Daily Lady Letty MD      sodium chloride  1,000 mL Intravenous Once Maximino Rose PA-C      traMADol  50 mg Oral Once KATHY Shoemaker          Today, Patient Was Seen By: Samantha Flores MD    ** Please Note: Dictation voice to text software may have been used in the creation of this document   **

## 2021-01-25 NOTE — ASSESSMENT & PLAN NOTE
Appears resolved  Hemoglobin stable    Continue to monitor closely  Prn bo flushes   Lovenox prophylactic dose and monitor for tolerance

## 2021-01-25 NOTE — PROGRESS NOTES
NEPHROLOGY PROGRESS NOTE   Abena Matute 63 y o  male MRN: [id number]  Unit/Bed#: S -01 Encounter: [id number]  Reason for Consult: PATRICK      SUMMARY:    63 y o  male with a PMH of dialysis dependent renal failure, who was admitted to [facility] after presenting from [facility] for escalation of care in the setting of COVID-19 infection  Blood cultures were positive in 1/2 bottles on 2 separate occasions and patient was appropriately treated  Creatinine was at baseline on admission but increasing since admission and currently up to 2 4  A renal consultation is requested today for assistance in the management of acute kidney injury  ASSESSMENT and PLAN:    Acute kidney injury  --with underlying chronic kidney disease and severe acute kidney injury history with multiple episodes  --admission creatinine 1 34 mg/dL and underwent a CT scan with contrast for which his renal function did worsen, while also having COVID-19  --nonoliguric  --creatinine currently plateaued in the mid 2s, hopefully will trend down the next few days  --continue current management    Chronic kidney disease stage III  --outpatient nephrologist in the [facility], does not know his nephrologist's name  --baseline creatinine 1-1 3 mg/dL  --episodes of severe acute kidney injury in the past    COVID-19  --management as per primary team    Hypertension  --stable      SUBJECTIVE / INTERVAL HISTORY:    Patient had a rapid response on January 24th for acute change in heart rate and was found to have atrial fibrillation with rapid ventricular response  Was given Cardizem  Remains on telemetry monitoring  Patient also on BiPAP    Patient also on 1-1 observation    OBJECTIVE:  Current Weight: Weight - Scale: (charted twice by mistake)  Vitals:    01/24/21 2330 01/25/21 0315 01/25/21 0700 01/25/21 0804   BP:   130/70    BP Location:   Left arm    Pulse:   100    Resp:   (!) 24    Temp:   99 4 °F (37 4 °C)    TempSrc:   Axillary    SpO2: 96% 93% 93% 93%   Weight:       Height:           Intake/Output Summary (Last 24 hours) at 1/25/2021 1575  Last data filed at 1/25/2021 0456  Gross per 24 hour   Intake    Output 2800 ml   Net -2800 ml       Review of Systems:    Unable to get a review of systems as patient is currently on BiPAP    Patient seen and examined through the glass window  Physical Exam  Constitutional:       General: He is not in acute distress  Appearance: Normal appearance  He is obese  He is not ill-appearing, toxic-appearing or diaphoretic  HENT:      Head: Normocephalic and atraumatic  Eyes:      General: No scleral icterus  Right eye: No discharge  Left eye: No discharge  Cardiovascular:      Rate and Rhythm: Normal rate  Pulmonary:      Effort: No respiratory distress  Comments: On BiPAP  Abdominal:      General: There is no distension  Tenderness: There is no guarding  Musculoskeletal:      Right lower leg: Edema present  Left lower leg: Edema present  Skin:     Coloration: Skin is not jaundiced or pale  Findings: No bruising, erythema, lesion or rash           Medications:    Current Facility-Administered Medications:     acetaminophen (TYLENOL) rectal suppository 325 mg, 325 mg, Rectal, Q6H PRN, Blas Spear PA-C    aspirin (ECOTRIN LOW STRENGTH) EC tablet 81 mg, 81 mg, Oral, Daily, Yareli Haynes PA-C, Stopped at 01/25/21 0925    atorvastatin (LIPITOR) tablet 40 mg, 40 mg, Per NG Tube, HS, Yareli Haynes PA-C, 40 mg at 01/23/21 2210    bisacodyl (DULCOLAX) rectal suppository 10 mg, 10 mg, Rectal, Daily PRN, Yareli Haynes PA-C    bumetanide (BUMEX) tablet 2 mg, 2 mg, Oral, Daily, Yareli Haynes PA-C, Stopped at 01/25/21 0927    chlorhexidine (PERIDEX) 0 12 % oral rinse 15 mL, 15 mL, Swish & Spit, Q12H [address], Yareli Haynes PA-C, Stopped at 01/25/21 1728    cholecalciferol (VITAMIN D3) tablet 2,000 Units, 2,000 Units, Per NG Tube, Daily, Yareli Haynes PA-C, Stopped at 01/25/21 4696    dexamethasone (DECADRON) injection 6 mg, 6 mg, Intravenous, Daily, Lionel Robbins PA-C, 6 mg at 01/24/21 0824    diltiazem (CARDIZEM SR) 12 hr capsule 90 mg, 90 mg, Oral, Q12H [address], Derian Moreno MD, Stopped at 01/25/21 3010    docusate sodium (COLACE) capsule 100 mg, 100 mg, Oral, BID, Tianna Salmons, CRNP, Stopped at 01/25/21 4725    enoxaparin (LOVENOX) subcutaneous injection 40 mg, 40 mg, Subcutaneous, Q24H [address], Lionel Robbins PA-C, 40 mg at 01/24/21 0825    melatonin tablet 3 mg, 3 mg, Oral, HS PRN, Tianna Salmons, CRNP, 3 mg at 01/22/21 2201    methocarbamol (ROBAXIN) tablet 500 mg, 500 mg, Oral, Q8H PRN, Tianna Salmons, CRNP, 500 mg at 01/23/21 1343    metoprolol (LOPRESSOR) injection 5 mg, 5 mg, Intravenous, Q6H PRN, Derian Moreno MD, 5 mg at 01/24/21 2058    metoprolol tartrate (LOPRESSOR) tablet 50 mg, 50 mg, Oral, TID, Blas Spear PA-C, Stopped at 01/25/21 0929    [COMPLETED] zinc sulfate (ZINCATE) capsule 220 mg, 220 mg, Per NG Tube, Daily, 220 mg at 01/24/21 0824 **FOLLOWED BY** multivitamin with iron-minerals liquid 15 mL, 15 mL, Per NG Tube, Daily, Yareli Haynes PA-C, Stopped at 01/25/21 0929    pantoprazole (PROTONIX) EC tablet 40 mg, 40 mg, Oral, Early Morning, Yareli Haynes PA-C, 40 mg at 01/23/21 0897    polyethylene glycol (MIRALAX) packet 17 g, 17 g, Oral, BID, Tianna Salmons, CRNP, Stopped at 01/25/21 0192    potassium chloride (K-DUR,KLOR-CON) CR tablet 40 mEq, 40 mEq, Oral, Once, Derian Moreno MD    sertraline (ZOLOFT) tablet 125 mg, 125 mg, Oral, Daily, Allen Avilez MD, 125 mg at 01/24/21 3898    sodium chloride 0 9 % bolus 1,000 mL, 1,000 mL, Intravenous, Once, Blas Spear PA-C    traMADol (ULTRAM) tablet 50 mg, 50 mg, Oral, Once, KATHY Hunt    Laboratory Results:  Results from last 7 days   Lab Units 01/25/21  6414 01/24/21  1411 01/24/21  0908 01/24/21  0532 01/23/21  0450 01/22/21  9285 01/20/21  0639 01/19/21  0512   WBC Thousand/uL 9 95  --   --  10 68* 9 56 8 75 4 79 6 54   HEMOGLOBIN g/dL 11 2*  --   --  10 9* 11 0* 12 2 11 1* 11 5*   I STAT HEMOGLOBIN g/dl  --  12 9 11 9*  --   --   --   --   --    HEMATOCRIT % 37 8  --   --  37 8 38 4 43 8 39 5 38 9   HEMATOCRIT, ISTAT %  --  38 35*  --   --   --   --   --    PLATELETS Thousands/uL 148*  --   --  123* 108* 119* 148* 158   POTASSIUM mmol/L 3 5  --   --  3 4* 3 1* 3 4* 3 7 3 9   CHLORIDE mmol/L 105  --   --  105 102 104 104 104   CO2 mmol/L 26  --   --  24 27 28 24 27   CO2, I-STAT mmol/L  --  33* 29  --   --   --   --   --    BUN mg/dL 74*  --   --  70* 71* 69* 50* 47*   CREATININE mg/dL 2 49*  --   --  2 48* 2 56* 2 40* 1 76* 1 74*   CALCIUM mg/dL 8 6  --   --  8 3 7 7* 7 9* 8 1* 8 4   MAGNESIUM mg/dL 2 9*  --   --  2 2  --   --  2 5  --    GLUCOSE, ISTAT mg/dl  --  131 109  --   --   --   --   --

## 2021-01-25 NOTE — RESPIRATORY THERAPY NOTE
ABG obtained from pt right radial artery with pt on BIPAP 20/8/60%  Positive Jeison's test  Site held to hemostasis  Specimen labeled and sent to the lab

## 2021-01-25 NOTE — ASSESSMENT & PLAN NOTE
Stable plateau at 1 7-0 5  Baseline creatinine close to 1 3  He did receive contrast on 1/17  ? ATN renal following  Closely monitor  Nephrology following  Patient was given his torsemide today in addition to IV diuretics with decompensation in his respiratory status  Monitor renal function closely  monitor need for further diuresis from a volume standpoint

## 2021-01-25 NOTE — PLAN OF CARE
Problem: Prexisting or High Potential for Compromised Skin Integrity  Goal: Skin integrity is maintained or improved  Description: INTERVENTIONS:  - Identify patients at risk for skin breakdown  - Assess and monitor skin integrity  - Assess and monitor nutrition and hydration status  - Monitor labs   - Assess for incontinence   - Turn and reposition patient  - Assist with mobility/ambulation  - Relieve pressure over bony prominences  - Avoid friction and shearing  - Provide appropriate hygiene as needed including keeping skin clean and dry  - Evaluate need for skin moisturizer/barrier cream  - Collaborate with interdisciplinary team   - Patient/family teaching  - Consider wound care consult   Outcome: Progressing     Problem: Potential for Falls  Goal: Patient will remain free of falls  Description: INTERVENTIONS:  - Assess patient frequently for physical needs  -  Identify cognitive and physical deficits and behaviors that affect risk of falls  -  [place] fall precautions as indicated by assessment   - Educate patient/family on patient safety including physical limitations  - Instruct patient to call for assistance with activity based on assessment  - Modify environment to reduce risk of injury  - Consider OT/PT consult to assist with strengthening/mobility  Outcome: Progressing     Problem: Nutrition/Hydration-ADULT  Goal: Nutrient/Hydration intake appropriate for improving, restoring or maintaining nutritional needs  Description: Monitor and assess patient's nutrition/hydration status for malnutrition  Collaborate with interdisciplinary team and initiate plan and interventions as ordered  Monitor patient's weight and dietary intake as ordered or per policy  Utilize nutrition screening tool and intervene as necessary  Determine patient's food preferences and provide high-protein, high-caloric foods as appropriate       INTERVENTIONS:  - Monitor oral intake, urinary output, labs, and treatment plans  - Assess nutrition and hydration status and recommend course of action  - Evaluate amount of meals eaten  - Assist patient with eating if necessary   - Allow adequate time for meals  - Recommend/ encourage appropriate diets, oral nutritional supplements, and vitamin/mineral supplements  - Order, calculate, and assess calorie counts as needed  - Recommend, monitor, and adjust tube feedings and TPN/PPN based on assessed needs  - Assess need for intravenous fluids  - Provide specific nutrition/hydration education as appropriate  - Include patient/family/caregiver in decisions related to nutrition  Outcome: Progressing     Problem: NEUROSENSORY - ADULT  Goal: Achieves stable or improved neurological status  Description: INTERVENTIONS  - Monitor and report changes in neurological status  - Monitor vital signs such as temperature, blood pressure, glucose, and any other labs ordered   - Initiate measures to prevent increased intracranial pressure  - Monitor for seizure activity and implement precautions if appropriate      Outcome: Progressing  Goal: Achieves maximal functionality and self care  Description: INTERVENTIONS  - Monitor swallowing and airway patency with patient fatigue and changes in neurological status  - Encourage and assist patient to increase activity and self care     - Encourage visually impaired, hearing impaired and aphasic patients to use assistive/communication devices  Outcome: Progressing     Problem: CARDIOVASCULAR - ADULT  Goal: Maintains optimal cardiac output and hemodynamic stability  Description: INTERVENTIONS:  - Monitor I/O, vital signs and rhythm  - Monitor for S/S and trends of decreased cardiac output  - Administer and titrate ordered vasoactive medications to optimize hemodynamic stability  - Assess quality of pulses, skin color and temperature  - Assess for signs of decreased coronary artery perfusion  - Instruct patient to report change in severity of symptoms  Outcome: Progressing  Goal: Absence of cardiac dysrhythmias or at baseline rhythm  Description: INTERVENTIONS:  - Continuous cardiac monitoring, vital signs, obtain 12 lead EKG if ordered  - Administer antiarrhythmic and heart rate control medications as ordered  - Monitor electrolytes and administer replacement therapy as ordered  Outcome: Progressing     Problem: RESPIRATORY - ADULT  Goal: Achieves optimal ventilation and oxygenation  Description: INTERVENTIONS:  - Assess for changes in respiratory status  - Assess for changes in mentation and behavior  - Position to facilitate oxygenation and minimize respiratory effort  - Oxygen administered by appropriate delivery if ordered  - Initiate smoking cessation education as indicated  - Encourage broncho-pulmonary hygiene including cough, deep breathe, Incentive Spirometry  - Assess the need for suctioning and aspirate as needed  - Assess and instruct to report SOB or any respiratory difficulty  - Respiratory Therapy support as indicated  Outcome: Progressing     Problem: GENITOURINARY - ADULT  Goal: Maintains or returns to baseline urinary function  Description: INTERVENTIONS:  - Assess urinary function  - Encourage oral fluids to ensure adequate hydration if ordered  - Administer IV fluids as ordered to ensure adequate hydration  - Administer ordered medications as needed  - Offer frequent toileting  - Follow urinary retention protocol if ordered  Outcome: Progressing  Goal: Absence of urinary retention  Description: INTERVENTIONS:  - Assess patients ability to void and empty bladder  - Monitor I/O  - Bladder scan as needed  - Discuss with physician/AP medications to alleviate retention as needed  - Discuss catheterization for long term situations as appropriate  Outcome: Progressing  Goal: Urinary catheter remains patent  Description: INTERVENTIONS:  - Assess patency of urinary catheter  - If patient has a chronic bo, consider changing catheter if non-functioning  - Follow guidelines for intermittent irrigation of non-functioning urinary catheter  Outcome: Progressing     Problem: METABOLIC, FLUID AND ELECTROLYTES - ADULT  Goal: Electrolytes maintained within normal limits  Description: INTERVENTIONS:  - Monitor labs and assess patient for signs and symptoms of electrolyte imbalances  - Administer electrolyte replacement as ordered  - Monitor response to electrolyte replacements, including repeat lab results as appropriate  - Instruct patient on fluid and nutrition as appropriate  Outcome: Progressing  Goal: Fluid balance maintained  Description: INTERVENTIONS:  - Monitor labs   - Monitor I/O and WT  - Instruct patient on fluid and nutrition as appropriate  - Assess for signs & symptoms of volume excess or deficit  Outcome: Progressing  Goal: Glucose maintained within target range  Description: INTERVENTIONS:  - Monitor Blood Glucose as ordered  - Assess for signs and symptoms of hyperglycemia and hypoglycemia  - Administer ordered medications to maintain glucose within target range  - Assess nutritional intake and initiate nutrition service referral as needed  Outcome: Progressing     Problem: MUSCULOSKELETAL - ADULT  Goal: Maintain or return mobility to safest level of function  Description: INTERVENTIONS:  - Assess patient's ability to carry out ADLs; assess patient's baseline for ADL function and identify physical deficits which impact ability to perform ADLs (bathing, care of mouth/teeth, toileting, grooming, dressing, etc )  - Assess/evaluate cause of self-care deficits   - Assess range of motion  - Assess patient's mobility  - Assess patient's need for assistive devices and provide as appropriate  - Encourage maximum independence but intervene and supervise when necessary  - Involve family in performance of ADLs  - Assess for home care needs following discharge   - Consider OT consult to assist with ADL evaluation and planning for discharge  - Provide patient education as appropriate  Outcome: Progressing  Goal: Maintain proper alignment of affected body part  Description: INTERVENTIONS:  - Support, maintain and protect limb and body alignment  - Provide patient/ family with appropriate education  Outcome: Progressing     Problem: INFECTION - ADULT  Goal: Absence or prevention of progression during hospitalization  Description: INTERVENTIONS:  - Assess and monitor for signs and symptoms of infection  - Monitor lab/diagnostic results  - Monitor all insertion sites, i e  indwelling lines, tubes, and drains  - Monitor endotracheal if appropriate and nasal secretions for changes in amount and color  - Depue appropriate cooling/warming therapies per order  - Administer medications as ordered  - Instruct and encourage patient and family to use good hand hygiene technique  - Identify and instruct in appropriate isolation precautions for identified infection/condition  Outcome: Progressing     Problem: DISCHARGE PLANNING  Goal: Discharge to home or other facility with appropriate resources  Description: INTERVENTIONS:  - Identify barriers to discharge w/patient and caregiver  - Arrange for needed discharge resources and transportation as appropriate  - Identify discharge learning needs (meds, wound care, etc )  - Arrange for interpretive services to assist at discharge as needed  - Refer to Case Management Department for coordinating discharge planning if the patient needs post-hospital services based on physician/advanced practitioner order or complex needs related to functional status, cognitive ability, or social support system  Outcome: Progressing     Problem: Knowledge Deficit  Goal: Patient/family/caregiver demonstrates understanding of disease process, treatment plan, medications, and discharge instructions  Description: Complete learning assessment and assess knowledge base    Interventions:  - Provide teaching at level of understanding  - Provide teaching via preferred learning methods  Outcome: Progressing

## 2021-01-25 NOTE — ASSESSMENT & PLAN NOTE
Patient presented with acute hypoxic and hypercapnic respiratory failure requiring intubation and mechanical ventilation secondary to COVID-19 pneumonia  Patient extubated 1/18 and transferred out of ICU 1/20  Obtunded yesterday afternoon, stat ABG showed acute respiratory acidosis with hypercapnia  Patient Reginaldo Carlson has a component of obesity hypoventilation syndrome/ANGELINA that is undiagnosed given his morbid obesity/body habitus  Patient now more awake and will trial off BiPAP  Will consult Pulmonology for hypercapnea

## 2021-01-25 NOTE — ASSESSMENT & PLAN NOTE
Patient confirms COVID-19 positive 01/16/2021  Initially presented to [address] Unit with respiratory distress and altered mental status  While at minor skin is he was intubated due to hypercapnia and hypoxia  He became hypotensive requiring pressor therapy and was transferred to [facility] for further critical care management of severe COVID-19 infection  Patient received 1st COVID-19 vaccine 1/8  Chest CT with minimal ground-glass opacities and left lower lobe consolidation concerning for superimposed bacterial process  Procalcitonin negative and antibiotics discontinued  Received convalescent plasma 1/17  Continue vitamin cocktail, changed to once daily Decadron  Was on 6 L nasal cannula until he he was found to be obtunded  yesterday afternoon with an acute respiratory acidosis and hypercapnic placed on biPAP  Now more awake and will trial off bipap again

## 2021-01-25 NOTE — PROGRESS NOTES
Progress Note - Lay Catherine 1960, 63 y o  male MRN: [id number]    Unit/Bed#: S -01 Encounter: [id number]    Primary Care Provider: Jose Guadalupe Pelayo DO   Date and time admitted to hospital: 1/17/2021  4:16 PM        * Pneumonia due to COVID-19 virus  Assessment & Plan  Patient confirms COVID-19 positive 01/16/2021  [place] [facility] with respiratory distress and altered mental status  While at minor skin is he was intubated due to hypercapnia and hypoxia  He became hypotensive requiring pressor therapy and was transferred to [facility] for further critical care management of severe COVID-19 infection  Patient received 1st COVID-19 vaccine 1/8  Chest CT with minimal ground-glass opacities and left lower lobe consolidation concerning for superimposed bacterial process  Procalcitonin negative and antibiotics discontinued  Received convalescent plasma 1/17  Continue vitamin cocktail, changed to once daily Decadron  Was on 6 L nasal cannula until he he was found to be obtunded  this afternoon with an acute respiratory acidosis and hypercapnic, currently now on BiPAP, see plan below    PATRICK (acute kidney injury) [facility])  Assessment & Plan  Improving  Baseline creatinine close to 1 3  He did receive contrast on 1/17  ? ATN renal following  Closely monitor  Nephrology following  Patient was given his torsemide today in addition to IV diuretics with decompensation in his respiratory status  Monitor renal function closely  monitor need for further diuresis from a volume standpoint  Hematuria  Assessment & Plan  Appears resolved  Hemoglobin stable    Continue to monitor closely  Prn bo flushes   Lovenox prophylactic dose and monitor for tolerance    Bacteremia  Assessment & Plan  · Patient with mixed Gram-positive blood cultures  · Admission blood cultures drawn on 01/17 at [facility] w/ 1 of 2 sets growing 2 colony types of coag neg staph   · Repeat blood cultures drawn on arrival to [facility] w/ 1 of 2 growing enterococcus faecalis and coag neg staph   · Transthoracic echo negative for vegetation  · Repeat blood culture results from 01/19 are negative at 72 hours no further vancomycin  · Appreciated ID   Acute respiratory failure with hypoxia and hypercarbia (HCC)  Assessment & Plan  · Patient presented with acute hypoxic and hypercapnic respiratory failure requiring intubation and mechanical ventilation secondary to COVID-19 pneumonia  · Patient extubated 1/18 and transferred out of ICU 1/20  · Obtunded this afternoon, stat ABG showed acute respiratory acidosis with hypercapnia  Patient likely also has a component of obesity hypoventilation syndrome/ANGELINA that is undiagnosed given his morbid obesity/body habitus  I did emergently discuss code status with patient's wife who reiterates patient is a full code and wants everything to be done including intubation if necessary  We discussed his significant medical comorbidities as well  Then discussed case with Critical Care given that patient remains a level 1 full code, plan is to trial BiPAP with repeat ABG in 1-2 hours, if mentation an ABG improving on BiPAP, patient can remain as step-down level to which he has been upgraded to this afternoon, if not improving with BiPAP, patient will need to be transferred to the ICU service for possible intubation  x1 Lasix 40 mg IV was given in addition to patient's p o  torsemide that he received today in an attempt to optimize respiratory status though does not appear overtly volume overloaded though I do hear some crackles bilaterally on exam   Reassess for further diuresis  Atrial fibrillation ([address] )  Assessment & Plan  Had an episode of AFib with RVR and associated hypotension overnight with rapid response  Responded favorably to IV Lopressor    This is likely due to the fact that patient missed, his beta-blocker doses yesterday evening due to hold parameters not being met which have since been adjusted  Mag 2 2 K 3 4, maintain optimize electrolytes with Mag greater than 2 in K greater than 4  Will replete potassium continue telemetry monitoring  Rate controlled on Lopressor and Cardizem  The Cardizem currently on hold due to low blood pressures  Per last cardiology note 10/2020 Pt was not on AC due to life threatening GI bleed  Dr Kash Orellana has spoken with Pt on multiple occasions about Watchman device pt has been declining  Will continue lovenox prophylactic 40 mg daily, would ideally need higher dosing due to weight though cannot in the setting of his renal dysfunction, will increase Lovenox as renal function improves    Morbid obesity   Assessment & Plan  I recommend diet, weight loss  Patient resides in nursing home and is nonambulatory    Acute metabolic encephalopathy  Assessment & Plan  Secondary to hypoxia and hypercapnia with history of stroke and residual left-sided weakness, patient initially was intubated at [facility] for both hypoxia and hypercapnia and was extubated in ICU here at [facility]  He has redeveloped hypercapnia and was obtunded this afternoon, improving with BiPAP  Avoid sedating medications  Melatonin q h s  Subjective/Objective     Subjective:   Patient had a rapid response overnight with rapid AFib with RVR and associated hypotension which responded to IV Lopressor  Patient was seen this afternoon and was noted to be obtunded, worsening mental status from baseline  Objective:  Vitals: Blood pressure 125/66, pulse 85, temperature 100 1 °F (37 8 °C), temperature source Oral, resp  rate 21, height 5' 11" (1 803 m), weight (!) 181 kg (398 lb 13 oz), SpO2 95 %  ,Body mass index is 55 62 kg/m²        Intake/Output Summary (Last 24 hours) at 1/24/2021 1942  Last data filed at 1/24/2021 1445  Gross per 24 hour   Intake 200 ml   Output 1650 ml   Net -1450 ml       Invasive Devices     Peripheral Intravenous Line            Peripheral IV 01/22/21 Proximal;Right;Ventral (anterior) Forearm 2 days    Peripheral IV 01/24/21 Left Hand less than 1 day          Drain            Urethral Catheter Straight-tip 16 Fr  7 days                Physical Exam  Vitals signs and nursing note reviewed  Constitutional:       General: He is in acute distress  Appearance: He is ill-appearing and toxic-appearing  Cardiovascular:      Rate and Rhythm: Normal rate and regular rhythm  Heart sounds: Murmur present  Pulmonary:      Comments: Decreased breath sounds bilaterally due to body habitus  Crackles noted bilaterally  Abdominal:      General: There is distension  Tenderness: There is no guarding  Comments: Morbidly obese abdomen   Musculoskeletal:      Right lower leg: Edema present  Left lower leg: Edema present  Neurological:      Mental Status: He is disoriented  Comments: Obtunded         Lab, Imaging and other studies: I have personally reviewed pertinent reports      VTE Pharmacologic Prophylaxis: Heparin  VTE Mechanical Prophylaxis: sequential compression device

## 2021-01-25 NOTE — QUICK NOTE
Patient removing bipap  Patient reported chest pain after nursing encouraged continued use of bipap    Vitals and EKG stable    Recent abg reviewed, shows some improvement but will benefit from continued bipap  Patient is on 1:1 for SI  Patient now requiring b/l wrist restraints  Discussed with supervisor, may be restrained on bipap while on continuous observation  Obtain repeat abg

## 2021-01-26 LAB
ALBUMIN SERPL BCP-MCNC: 2.5 G/DL (ref 3.5–5)
ALP SERPL-CCNC: 62 U/L (ref 46–116)
ALT SERPL W P-5'-P-CCNC: 33 U/L (ref 12–78)
ANION GAP SERPL CALCULATED.3IONS-SCNC: 11 MMOL/L (ref 4–13)
ANION GAP SERPL CALCULATED.3IONS-SCNC: 9 MMOL/L (ref 4–13)
AST SERPL W P-5'-P-CCNC: 21 U/L (ref 5–45)
BASOPHILS # BLD AUTO: 0 THOUSANDS/ΜL (ref 0–0.1)
BASOPHILS NFR BLD AUTO: 0 % (ref 0–1)
BILIRUB SERPL-MCNC: 0.54 MG/DL (ref 0.2–1)
BUN SERPL-MCNC: 72 MG/DL (ref 5–25)
BUN SERPL-MCNC: 74 MG/DL (ref 5–25)
CALCIUM ALBUM COR SERPL-MCNC: 9.9 MG/DL (ref 8.3–10.1)
CALCIUM SERPL-MCNC: 8.7 MG/DL (ref 8.3–10.1)
CALCIUM SERPL-MCNC: 8.8 MG/DL (ref 8.3–10.1)
CHLORIDE SERPL-SCNC: 107 MMOL/L (ref 100–108)
CHLORIDE SERPL-SCNC: 108 MMOL/L (ref 100–108)
CO2 SERPL-SCNC: 28 MMOL/L (ref 21–32)
CO2 SERPL-SCNC: 29 MMOL/L (ref 21–32)
CREAT SERPL-MCNC: 2.54 MG/DL (ref 0.6–1.3)
CREAT SERPL-MCNC: 2.71 MG/DL (ref 0.6–1.3)
EOSINOPHIL # BLD AUTO: 0 THOUSAND/ΜL (ref 0–0.61)
EOSINOPHIL NFR BLD AUTO: 0 % (ref 0–6)
ERYTHROCYTE [DISTWIDTH] IN BLOOD BY AUTOMATED COUNT: 18 % (ref 11.6–15.1)
GFR SERPL CREATININE-BSD FRML MDRD: 24 ML/MIN/1.73SQ M
GFR SERPL CREATININE-BSD FRML MDRD: 26 ML/MIN/1.73SQ M
GLUCOSE SERPL-MCNC: 101 MG/DL (ref 65–140)
GLUCOSE SERPL-MCNC: 114 MG/DL (ref 65–140)
GLUCOSE SERPL-MCNC: 144 MG/DL (ref 65–140)
GLUCOSE SERPL-MCNC: 89 MG/DL (ref 65–140)
GLUCOSE SERPL-MCNC: 89 MG/DL (ref 65–140)
GLUCOSE SERPL-MCNC: 92 MG/DL (ref 65–140)
HCT VFR BLD AUTO: 37.1 % (ref 36.5–49.3)
HGB BLD-MCNC: 11 G/DL (ref 12–17)
IMM GRANULOCYTES # BLD AUTO: 0.05 THOUSAND/UL (ref 0–0.2)
IMM GRANULOCYTES NFR BLD AUTO: 1 % (ref 0–2)
LYMPHOCYTES # BLD AUTO: 0.16 THOUSANDS/ΜL (ref 0.6–4.47)
LYMPHOCYTES NFR BLD AUTO: 3 % (ref 14–44)
MCH RBC QN AUTO: 23.3 PG (ref 26.8–34.3)
MCHC RBC AUTO-ENTMCNC: 29.6 G/DL (ref 31.4–37.4)
MCV RBC AUTO: 78 FL (ref 82–98)
MONOCYTES # BLD AUTO: 0.35 THOUSAND/ΜL (ref 0.17–1.22)
MONOCYTES NFR BLD AUTO: 6 % (ref 4–12)
NEUTROPHILS # BLD AUTO: 5.57 THOUSANDS/ΜL (ref 1.85–7.62)
NEUTS SEG NFR BLD AUTO: 90 % (ref 43–75)
NRBC BLD AUTO-RTO: 0 /100 WBCS
PLATELET # BLD AUTO: 165 THOUSANDS/UL (ref 149–390)
PMV BLD AUTO: 11.5 FL (ref 8.9–12.7)
POTASSIUM SERPL-SCNC: 3.2 MMOL/L (ref 3.5–5.3)
POTASSIUM SERPL-SCNC: 3.5 MMOL/L (ref 3.5–5.3)
PROT SERPL-MCNC: 6.4 G/DL (ref 6.4–8.2)
RBC # BLD AUTO: 4.73 MILLION/UL (ref 3.88–5.62)
SODIUM SERPL-SCNC: 146 MMOL/L (ref 136–145)
SODIUM SERPL-SCNC: 146 MMOL/L (ref 136–145)
VANCOMYCIN SERPL-MCNC: 23.5 UG/ML
WBC # BLD AUTO: 6.13 THOUSAND/UL (ref 4.31–10.16)

## 2021-01-26 PROCEDURE — 80053 COMPREHEN METABOLIC PANEL: CPT | Performed by: NURSE PRACTITIONER

## 2021-01-26 PROCEDURE — 80202 ASSAY OF VANCOMYCIN: CPT | Performed by: INTERNAL MEDICINE

## 2021-01-26 PROCEDURE — 99233 SBSQ HOSP IP/OBS HIGH 50: CPT | Performed by: INTERNAL MEDICINE

## 2021-01-26 PROCEDURE — 94760 N-INVAS EAR/PLS OXIMETRY 1: CPT

## 2021-01-26 PROCEDURE — 82948 REAGENT STRIP/BLOOD GLUCOSE: CPT

## 2021-01-26 PROCEDURE — 92610 EVALUATE SWALLOWING FUNCTION: CPT

## 2021-01-26 PROCEDURE — 80048 BASIC METABOLIC PNL TOTAL CA: CPT | Performed by: INTERNAL MEDICINE

## 2021-01-26 PROCEDURE — 94762 N-INVAS EAR/PLS OXIMTRY CONT: CPT

## 2021-01-26 PROCEDURE — 85025 COMPLETE CBC W/AUTO DIFF WBC: CPT | Performed by: NURSE PRACTITIONER

## 2021-01-26 RX ORDER — POTASSIUM CHLORIDE 14.9 MG/ML
20 INJECTION INTRAVENOUS ONCE
Status: COMPLETED | OUTPATIENT
Start: 2021-01-26 | End: 2021-01-26

## 2021-01-26 RX ORDER — POTASSIUM CHLORIDE 20 MEQ/1
40 TABLET, EXTENDED RELEASE ORAL ONCE
Status: COMPLETED | OUTPATIENT
Start: 2021-01-26 | End: 2021-01-26

## 2021-01-26 RX ORDER — BUMETANIDE 0.25 MG/ML
1 INJECTION, SOLUTION INTRAMUSCULAR; INTRAVENOUS ONCE
Status: COMPLETED | OUTPATIENT
Start: 2021-01-26 | End: 2021-01-26

## 2021-01-26 RX ADMIN — METOPROLOL TARTRATE 50 MG: 50 TABLET, FILM COATED ORAL at 16:29

## 2021-01-26 RX ADMIN — METOPROLOL TARTRATE 5 MG: 5 INJECTION INTRAVENOUS at 13:38

## 2021-01-26 RX ADMIN — DOCUSATE SODIUM 100 MG: 100 CAPSULE, LIQUID FILLED ORAL at 18:38

## 2021-01-26 RX ADMIN — POTASSIUM CHLORIDE 20 MEQ: 14.9 INJECTION, SOLUTION INTRAVENOUS at 12:27

## 2021-01-26 RX ADMIN — DEXAMETHASONE SODIUM PHOSPHATE 6 MG: 4 INJECTION INTRA-ARTICULAR; INTRALESIONAL; INTRAMUSCULAR; INTRAVENOUS; SOFT TISSUE at 08:31

## 2021-01-26 RX ADMIN — ATORVASTATIN CALCIUM 40 MG: 40 TABLET, FILM COATED ORAL at 21:12

## 2021-01-26 RX ADMIN — POLYETHYLENE GLYCOL 3350 17 G: 17 POWDER, FOR SOLUTION ORAL at 21:12

## 2021-01-26 RX ADMIN — ENOXAPARIN SODIUM 40 MG: 40 INJECTION SUBCUTANEOUS at 08:31

## 2021-01-26 RX ADMIN — DILTIAZEM HYDROCHLORIDE 90 MG: 90 CAPSULE, EXTENDED RELEASE ORAL at 21:14

## 2021-01-26 RX ADMIN — CHLORHEXIDINE GLUCONATE 0.12% ORAL RINSE 15 ML: 1.2 LIQUID ORAL at 21:12

## 2021-01-26 RX ADMIN — POTASSIUM CHLORIDE 40 MEQ: 1500 TABLET, EXTENDED RELEASE ORAL at 14:49

## 2021-01-26 RX ADMIN — METOPROLOL TARTRATE 50 MG: 50 TABLET, FILM COATED ORAL at 21:13

## 2021-01-26 RX ADMIN — BUMETANIDE 1 MG: 0.25 INJECTION INTRAMUSCULAR; INTRAVENOUS at 16:50

## 2021-01-26 NOTE — PROGRESS NOTES
NEPHROLOGY PROGRESS NOTE   Phil Horne 63 y o  male MRN: [id number]  Unit/Bed#: S -01 Encounter: [id number]  Reason for Consult: PATRICK/CKD    ASSESSMENT/PLAN:  1  Acute Kidney Injury, POA- likely secondary to ATN in the setting of COVID 19 cytokine release + contrast induced nephropathy +/- vancomycin toxicity  - creatinine pleateaued at 2 5 for the past 4 days  - UA: large blood, innumerable RBC, trace leuks, 4-10 wbc, moderate bacteria, trace ketones, 2+ protein, 2-3 coarse granular casts  - CPK: 76 on admission  - bo catheter in place  2  Chronic Kidney disease stage III- Baseline creatinine 1-1 3  Follows with nephrologist in [facility]  3  Volume Status- he has not been receiving his oral bumex due to being NPO  - consider changing to IV bumex   4  Hypokalemia- will replete with KCl 20meq IV x1  5  Hypernatremia- mild, secondary to NPO status  - if worsens, may need D5W which will not affect volume status  6  Anemia- mild  7  Hypertension- BP controlled  8  A fib- patient tachycardic  - has not received diltiazem at all due to NPO/bipap  - has not received metoprolol since 1/24 for one dose only  9  Bacteremia- ID on board  - off abx currently  - monitoring vancomycin level as it was elevated on 1/22  10  COVID 19 Infection- per primary team    Disposition:  Giving IV potassium  SUBJECTIVE:  Patient sleeping  Per aide, no acute complaints      OBJECTIVE:  Current Weight: Weight - Scale: (charted twice by mistake)  Vitals:    01/26/21 0733 01/26/21 0913 01/26/21 1105 01/26/21 1145   BP: 144/81      BP Location: Right arm      Pulse: 105   (!) 110   Resp: 22   20   Temp: 99 5 °F (37 5 °C)   99 °F (37 2 °C)   TempSrc: Axillary   Oral   SpO2: 94% 90% 90% 93%   Weight:       Height:           Intake/Output Summary (Last 24 hours) at 1/26/2021 1218  Last data filed at 1/26/2021 0913  Gross per 24 hour   Intake 0 ml   Output 1025 ml   Net -1025 ml     Due to COVID 19 infection, patient was seen through the window  Unable to answer the phone as he is in soft restraints  He is currently sleeping  Discussed with aide at bedside  Discussed with nurse via tiger text      General: NAD  Skin: no rash  Eyes: closed  Respiratory: not labored, basilar crackles appreciated by nurse  Cardiac: tachycardic  Extremities: + edema  GI: not distended  Neuro: sleeping    Medications:    Current Facility-Administered Medications:     acetaminophen (TYLENOL) rectal suppository 325 mg, 325 mg, Rectal, Q6H PRN, Blas Spear PA-C    aspirin (ECOTRIN LOW STRENGTH) EC tablet 81 mg, 81 mg, Oral, Daily, Yareli Haynes PA-C, Stopped at 01/25/21 0925    atorvastatin (LIPITOR) tablet 40 mg, 40 mg, Per NG Tube, HS, Yareli Haynes PA-C, 40 mg at 01/23/21 2210    bisacodyl (DULCOLAX) rectal suppository 10 mg, 10 mg, Rectal, Daily PRN, Yareli Haynes PA-C    bumetanide (BUMEX) tablet 2 mg, 2 mg, Oral, Daily, Yareli Haynes PA-C, Stopped at 01/25/21 0927    chlorhexidine (PERIDEX) 0 12 % oral rinse 15 mL, 15 mL, Swish & Spit, Q12H [address], Yareli Haynes PA-C, Stopped at 01/25/21 8214    cholecalciferol (VITAMIN D3) tablet 2,000 Units, 2,000 Units, Per NG Tube, Daily, Yareli Haynes PA-C, Stopped at 01/25/21 0927    dexamethasone (DECADRON) injection 6 mg, 6 mg, Intravenous, Daily, Lionel Robbins PA-C, 6 mg at 01/26/21 0831    diltiazem (CARDIZEM SR) 12 hr capsule 90 mg, 90 mg, Oral, Q12H [address], Ama Cordoba MD, Stopped at 01/25/21 8621    docusate sodium (COLACE) capsule 100 mg, 100 mg, Oral, BID, KATHY Luna, Stopped at 01/25/21 2128    enoxaparin (LOVENOX) subcutaneous injection 40 mg, 40 mg, Subcutaneous, Q24H [address], Lionel Robbins PA-C, 40 mg at 01/26/21 0831    melatonin tablet 3 mg, 3 mg, Oral, HS PRN, Ozella Dace, CRNP, 3 mg at 01/22/21 2201    methocarbamol (ROBAXIN) tablet 500 mg, 500 mg, Oral, Q8H PRN, Ozella Dace, CRNP, 500 mg at 01/23/21 1343    metoprolol (LOPRESSOR) injection 5 mg, 5 mg, Intravenous, Q6H PRN, Natan Monreal MD, 5 mg at 01/25/21 1310    metoprolol tartrate (LOPRESSOR) tablet 50 mg, 50 mg, Oral, TID, Blas Spear PA-C, Stopped at 01/25/21 0929    [COMPLETED] zinc sulfate (ZINCATE) capsule 220 mg, 220 mg, Per NG Tube, Daily, 220 mg at 01/24/21 0824 **FOLLOWED BY** multivitamin with iron-minerals liquid 15 mL, 15 mL, Per NG Tube, Daily, Yareli Haynes PA-C, Stopped at 01/25/21 0929    pantoprazole (PROTONIX) EC tablet 40 mg, 40 mg, Oral, Early Morning, Yareli Haynes PA-C, 40 mg at 01/23/21 0710    polyethylene glycol (MIRALAX) packet 17 g, 17 g, Oral, BID, KATHY Sun, Stopped at 01/25/21 1408    potassium chloride 20 mEq IVPB (premix), 20 mEq, Intravenous, Once, HERBERT Garrett    sertraline (ZOLOFT) tablet 125 mg, 125 mg, Oral, Daily, Amparo Greene MD, Stopped at 01/25/21 6143    sodium chloride 0 9 % bolus 1,000 mL, 1,000 mL, Intravenous, Once, Blas Spear PA-C    traMADol Maria Guadalupe Guarneri) tablet 50 mg, 50 mg, Oral, Once, KATHY Sun    Laboratory Results:  Results from last 7 days   Lab Units 01/26/21  0522 01/26/21  0025 01/25/21  1103 01/24/21  1411 01/24/21  5672 01/24/21  0532 01/23/21  0450 01/22/21  0527 01/20/21  0639   WBC Thousand/uL  --  6 13 9 95  --   --  10 68* 9 56 8 75 4 79   HEMOGLOBIN g/dL  --  11 0* 11 2*  --   --  10 9* 11 0* 12 2 11 1*   I STAT HEMOGLOBIN g/dl  --   --   --  12 9 11 9*  --   --   --   --    HEMATOCRIT %  --  37 1 37 8  --   --  37 8 38 4 43 8 39 5   HEMATOCRIT, ISTAT %  --   --   --  38 35*  --   --   --   --    PLATELETS Thousands/uL  --  165 148*  --   --  123* 108* 119* 148*   POTASSIUM mmol/L 3 2* 3 5 3 5  --   --  3 4* 3 1* 3 4* 3 7   CHLORIDE mmol/L 107 108 105  --   --  105 102 104 104   CO2 mmol/L 28 29 26  --   --  24 27 28 24   CO2, I-STAT mmol/L  --   --   --  33* 29  --   --   --   --    BUN mg/dL 72* 74* 74*  --   --  70* 71* 69* 50*   CREATININE mg/dL 2 54* 2 71* 2 49*  --   --  2 48* 2 56* 2 40* 1 76*   CALCIUM mg/dL 8 8 8 7 8 6  --   --  8 3 7 7* 7 9* 8 1*   MAGNESIUM mg/dL  --   --  2 9*  --   --  2 2  --   --  2 5   GLUCOSE, ISTAT mg/dl  --   --   --  131 109  --   --   --   --

## 2021-01-26 NOTE — PLAN OF CARE
Problem: Prexisting or High Potential for Compromised Skin Integrity  Goal: Skin integrity is maintained or improved  Description: INTERVENTIONS:  - Identify patients at risk for skin breakdown  - Assess and monitor skin integrity  - Assess and monitor nutrition and hydration status  - Monitor labs   - Assess for incontinence   - Turn and reposition patient  - Assist with mobility/ambulation  - Relieve pressure over bony prominences  - Avoid friction and shearing  - Provide appropriate hygiene as needed including keeping skin clean and dry  - Evaluate need for skin moisturizer/barrier cream  - Collaborate with interdisciplinary team   - Patient/family teaching  - Consider wound care consult   Outcome: Progressing     Problem: Potential for Falls  Goal: Patient will remain free of falls  Description: INTERVENTIONS:  - Assess patient frequently for physical needs  -  Identify cognitive and physical deficits and behaviors that affect risk of falls  -  [place] fall precautions as indicated by assessment   - Educate patient/family on patient safety including physical limitations  - Instruct patient to call for assistance with activity based on assessment  - Modify environment to reduce risk of injury  - Consider OT/PT consult to assist with strengthening/mobility  Outcome: Progressing     Problem: Nutrition/Hydration-ADULT  Goal: Nutrient/Hydration intake appropriate for improving, restoring or maintaining nutritional needs  Description: Monitor and assess patient's nutrition/hydration status for malnutrition  Collaborate with interdisciplinary team and initiate plan and interventions as ordered  Monitor patient's weight and dietary intake as ordered or per policy  Utilize nutrition screening tool and intervene as necessary  Determine patient's food preferences and provide high-protein, high-caloric foods as appropriate       INTERVENTIONS:  - Monitor oral intake, urinary output, labs, and treatment plans  - Assess nutrition and hydration status and recommend course of action  - Evaluate amount of meals eaten  - Assist patient with eating if necessary   - Allow adequate time for meals  - Recommend/ encourage appropriate diets, oral nutritional supplements, and vitamin/mineral supplements  - Order, calculate, and assess calorie counts as needed  - Recommend, monitor, and adjust tube feedings and TPN/PPN based on assessed needs  - Assess need for intravenous fluids  - Provide specific nutrition/hydration education as appropriate  - Include patient/family/caregiver in decisions related to nutrition  Outcome: Progressing     Problem: NEUROSENSORY - ADULT  Goal: Achieves stable or improved neurological status  Description: INTERVENTIONS  - Monitor and report changes in neurological status  - Monitor vital signs such as temperature, blood pressure, glucose, and any other labs ordered   - Initiate measures to prevent increased intracranial pressure  - Monitor for seizure activity and implement precautions if appropriate      Outcome: Progressing  Goal: Achieves maximal functionality and self care  Description: INTERVENTIONS  - Monitor swallowing and airway patency with patient fatigue and changes in neurological status  - Encourage and assist patient to increase activity and self care     - Encourage visually impaired, hearing impaired and aphasic patients to use assistive/communication devices  Outcome: Progressing     Problem: CARDIOVASCULAR - ADULT  Goal: Maintains optimal cardiac output and hemodynamic stability  Description: INTERVENTIONS:  - Monitor I/O, vital signs and rhythm  - Monitor for S/S and trends of decreased cardiac output  - Administer and titrate ordered vasoactive medications to optimize hemodynamic stability  - Assess quality of pulses, skin color and temperature  - Assess for signs of decreased coronary artery perfusion  - Instruct patient to report change in severity of symptoms  Outcome: Progressing  Goal: Absence of cardiac dysrhythmias or at baseline rhythm  Description: INTERVENTIONS:  - Continuous cardiac monitoring, vital signs, obtain 12 lead EKG if ordered  - Administer antiarrhythmic and heart rate control medications as ordered  - Monitor electrolytes and administer replacement therapy as ordered  Outcome: Progressing     Problem: RESPIRATORY - ADULT  Goal: Achieves optimal ventilation and oxygenation  Description: INTERVENTIONS:  - Assess for changes in respiratory status  - Assess for changes in mentation and behavior  - Position to facilitate oxygenation and minimize respiratory effort  - Oxygen administered by appropriate delivery if ordered  - Initiate smoking cessation education as indicated  - Encourage broncho-pulmonary hygiene including cough, deep breathe, Incentive Spirometry  - Assess the need for suctioning and aspirate as needed  - Assess and instruct to report SOB or any respiratory difficulty  - Respiratory Therapy support as indicated  Outcome: Progressing     Problem: GENITOURINARY - ADULT  Goal: Maintains or returns to baseline urinary function  Description: INTERVENTIONS:  - Assess urinary function  - Encourage oral fluids to ensure adequate hydration if ordered  - Administer IV fluids as ordered to ensure adequate hydration  - Administer ordered medications as needed  - Offer frequent toileting  - Follow urinary retention protocol if ordered  Outcome: Progressing  Goal: Absence of urinary retention  Description: INTERVENTIONS:  - Assess patients ability to void and empty bladder  - Monitor I/O  - Bladder scan as needed  - Discuss with physician/AP medications to alleviate retention as needed  - Discuss catheterization for long term situations as appropriate  Outcome: Progressing  Goal: Urinary catheter remains patent  Description: INTERVENTIONS:  - Assess patency of urinary catheter  - If patient has a chronic bo, consider changing catheter if non-functioning  - Follow guidelines for intermittent irrigation of non-functioning urinary catheter  Outcome: Progressing     Problem: METABOLIC, FLUID AND ELECTROLYTES - ADULT  Goal: Electrolytes maintained within normal limits  Description: INTERVENTIONS:  - Monitor labs and assess patient for signs and symptoms of electrolyte imbalances  - Administer electrolyte replacement as ordered  - Monitor response to electrolyte replacements, including repeat lab results as appropriate  - Instruct patient on fluid and nutrition as appropriate  Outcome: Progressing  Goal: Fluid balance maintained  Description: INTERVENTIONS:  - Monitor labs   - Monitor I/O and WT  - Instruct patient on fluid and nutrition as appropriate  - Assess for signs & symptoms of volume excess or deficit  Outcome: Progressing  Goal: Glucose maintained within target range  Description: INTERVENTIONS:  - Monitor Blood Glucose as ordered  - Assess for signs and symptoms of hyperglycemia and hypoglycemia  - Administer ordered medications to maintain glucose within target range  - Assess nutritional intake and initiate nutrition service referral as needed  Outcome: Progressing     Problem: MUSCULOSKELETAL - ADULT  Goal: Maintain or return mobility to safest level of function  Description: INTERVENTIONS:  - Assess patient's ability to carry out ADLs; assess patient's baseline for ADL function and identify physical deficits which impact ability to perform ADLs (bathing, care of mouth/teeth, toileting, grooming, dressing, etc )  - Assess/evaluate cause of self-care deficits   - Assess range of motion  - Assess patient's mobility  - Assess patient's need for assistive devices and provide as appropriate  - Encourage maximum independence but intervene and supervise when necessary  - Involve family in performance of ADLs  - Assess for home care needs following discharge   - Consider OT consult to assist with ADL evaluation and planning for discharge  - Provide patient education as appropriate  Outcome: Progressing  Goal: Maintain proper alignment of affected body part  Description: INTERVENTIONS:  - Support, maintain and protect limb and body alignment  - Provide patient/ family with appropriate education  Outcome: Progressing     Problem: INFECTION - ADULT  Goal: Absence or prevention of progression during hospitalization  Description: INTERVENTIONS:  - Assess and monitor for signs and symptoms of infection  - Monitor lab/diagnostic results  - Monitor all insertion sites, i e  indwelling lines, tubes, and drains  - Monitor endotracheal if appropriate and nasal secretions for changes in amount and color  - [place] appropriate cooling/warming therapies per order  - Administer medications as ordered  - Instruct and encourage patient and family to use good hand hygiene technique  - Identify and instruct in appropriate isolation precautions for identified infection/condition  Outcome: Progressing     Problem: DISCHARGE PLANNING  Goal: Discharge to home or other facility with appropriate resources  Description: INTERVENTIONS:  - Identify barriers to discharge w/patient and caregiver  - Arrange for needed discharge resources and transportation as appropriate  - Identify discharge learning needs (meds, wound care, etc )  - Arrange for interpretive services to assist at discharge as needed  - Refer to Case Management Department for coordinating discharge planning if the patient needs post-hospital services based on physician/advanced practitioner order or complex needs related to functional status, cognitive ability, or social support system  Outcome: Progressing     Problem: Knowledge Deficit  Goal: Patient/family/caregiver demonstrates understanding of disease process, treatment plan, medications, and discharge instructions  Description: Complete learning assessment and assess knowledge base    Interventions:  - Provide teaching at level of understanding  - Provide teaching via preferred learning methods  Outcome: Progressing

## 2021-01-26 NOTE — QUICK NOTE
Notified by nursing that patient's pulse ox dropped to 85% on 13L of oxygen  Per nursing, patient's breathing reportedly appeared more labored at that time  He was placed on BiPAP and pulse ox has improved into the mid 90s  Remainder of vital signs are stable  Patient required BiPAP last evening as well and was weaned off during the day time  Patient resting in bed with BiPAP in place, does not appear to be in any distress  Per nursing assessment, lungs with decreased breath sounds, expiratory wheeze  Repeat CXR obtained, suspect worsening vascular congestion  ABG ordered but unable to be obtained per respiratory given patient movement  Repeat lab work obtained with Cr up to 2 71 from 2 49  Patient on PO Bumex 2 mg daily but did not receive AM dose 1/25 due to being on BiPAP  Continue to monitor on BiPAP for now  Consider IV diuresis if does not improve on BiPAP   Repeat lab work in AM

## 2021-01-26 NOTE — ASSESSMENT & PLAN NOTE
Mixed Gram Positive Blood cultures   Admission blood cultures were drawn on the 17th at [facility] with 1 of 2 sets growing 2 colony types of coag-negative staph   Repeat blood cultures were drawn on arrival to [address] [place] [address] 1 of 2 blood cultures here are growing Enterococcus faecalis and coag-negative staph  Blood culture contaminants highly suspected with 3 different colony types of CNS and 1 of Enterococcus in same set as CNS and known difficult IV/blood draw access  · ID following, input appreciated    · Has hx of AVR 2014, TTE negative for vegetations  · 1/19/21 blood cultures negative x 5 days  · Monitoring off abx at present; still with supra therapeutic blood levels of vancomycin and vanco retention  · Will need repeat blood cultures next week once retained vanco out of system

## 2021-01-26 NOTE — ASSESSMENT & PLAN NOTE
Had an episode of AFib with RVR and associated hypotension overnight with rapid response  Responded favorably to IV Lopressor  This is likely due to the fact that patient missed, his beta-blocker doses evening due to hold parameters not being met which have since been adjusted  · HRs now elevated as he has not been receiving PO medications due to NPO  · Per last cardiology note 10/2020 Pt was not on AC due to life threatening GI bleed  Dr Cheko Rouse has spoken with Pt on multiple occasions about Watchman device pt has been declining    · Will continue lovenox prophylactic 40 mg daily, would ideally need higher dosing due to weight though cannot in the setting of his renal dysfunction, will increase Lovenox as renal function improves

## 2021-01-26 NOTE — SPEECH THERAPY NOTE
Speech-Language Pathology Bedside Swallow Evaluation        Patient Name: Hector Mcelroy    [id number] Date: 1/26/2021     Problem List  Patient Active Problem List   Diagnosis    Aortic stenosis, mild    Essential hypertension    Hyperlipidemia    Left bundle branch block    Murmur    Osteoarthritis of both knees    Pericardial disease    Sciatica    Age-related cataract of right eye    Venous insufficiency    Bilateral leg edema    Stress-induced cardiomyopathy    History of aortic valve replacement with bioprosthetic valve    Persistent atrial fibrillation (HCC)    MSSA bacteremia - Resolved septic shock    Acute blood loss anemia - Gastric ulcer    Leukocytosis    Acute metabolic encephalopathy    Skin rash    Acute renal failure    Coagulopathy (HCC)    GI bleeding    Age-related nuclear cataract, bilateral    Open angle with borderline findings, low risk, bilateral    Presence of intraocular lens    Vitreous degeneration of both eyes    Left arm swelling    Dysphagia    Cellulitis/myositis of left forearm    Left internal jugular vein thrombus    Morbid obesity     Depression    Abnormal CT of the head    Gastric ulcer    Atrial fibrillation ([address] )    Pneumonia due to COVID-19 virus    Acute respiratory failure with hypoxia and hypercarbia (HCC)    Bacteremia    Hematuria    Acute kidney injury superimposed on CKD [facility])       Past Medical History  Past Medical History:   Diagnosis Date    Allergic rhinitis     last assessed 9/12/12    Finger fracture, right     Closed fx of the middle phalanx of the right 5th finger  last assessed 1/30/14    GI bleed 2/22/2019    Hemorrhoids     last assessed 2/10/14    Hyperlipidemia     Hypertension     Stroke [facility])        Past Surgical History  Past Surgical History:   Procedure Laterality Date    AORTIC VALVE REPLACEMENT  07/30/2014    AVR with 25mm [facility] Ease bovine pericardial valve    CARDIAC CATHETERIZATION  07/18/2014    SLB left main-normal, circumflex-normal, ramus intermedius-normal, RCA was large and dominant giving rise to the PDA & a large posterolateral branch  No disease  last assessed 8/19/14     COLONOSCOPY N/A 2/25/2019    Procedure: COLONOSCOPY;  Surgeon: Mell Boston DO;  [facility]: [facility]; Service: Gastroenterology    ESOPHAGOGASTRODUODENOSCOPY N/A 2/22/2019    Procedure: ESOPHAGOGASTRODUODENOSCOPY (EGD)-roadshow overnight;  Surgeon: Mell Boston DO;  [facility]; Service: Gastroenterology    ESOPHAGOGASTRODUODENOSCOPY N/A 2/23/2019    Procedure: ESOPHAGOGASTRODUODENOSCOPY (EGD); Surgeon: Abeba Vaz MD;  [facility]; Service: Gastroenterology    ESOPHAGOGASTRODUODENOSCOPY N/A 2/25/2019    Procedure: ESOPHAGOGASTRODUODENOSCOPY (EGD); Surgeon: Mell Boston DO;  [facility]; Service: Gastroenterology    IR NON-TUNNELED CENTRAL LINE PLACEMENT  3/1/2019    IR NON-TUNNELED CENTRAL LINE PLACEMENT  2/4/2019    IR TUNNELED DIALYSIS CATHETER CHECK/CHANGE/REPOSITION/ANGIOPLASTY  4/18/2019    IR TUNNELED DIALYSIS CATHETER PLACEMENT  2/4/2019    IR TUNNELED DIALYSIS CATHETER PLACEMENT  2/18/2019    KNEE ARTHROSCOPY      KNEE CARTILAGE SURGERY Left     medial meniscus tear     TESTICLE SURGERY      TONSILLECTOMY AND ADENOIDECTOMY      TOOTH EXTRACTION           Current Medical Status  Pt is a 63 y o  male who presented to [address] [place] [address] on 1/17 with respiratory distress/AMS and fever  Patient is COVID+ although initial date of + status unknown  He was intubated on 1/17 and extubated on 1/18  He passed RN dysphagia screen and was started on a diet  On 1/24 patient had worsening respiratory status and bradycardia and became obtunded  A RRT was called and patient has since been on a off BIPaP and HFNC 2' acute respiratory acidosis and hypercapnic placed on BIPAP  Per RN he has been having difficulty with meds   CXR yesterday revealed worsening congestion  Patient is known to this department from prior admissions he was last seen 4/17/2019 for VBS  A diet of mechanical soft with nectar thick liquids was recommended at that time with noted deep penetration and eventual silent aspiration on thin liquids  He did benefit from strategies at that time however when asked today patient was unable to recall and indicated he did not like the thickened liquids  Patient was unable to tell me if he has worked with ST or what diet he is on prior to admission  Past medical history:   Please see H&P for details    Special Studies:  1/25 CXR: Worsening multifocal airspace disease/pneumonia  In the setting of clinically suspected/proven COVID-19, this plain film appearance while nonspecific, can be seen in cases of viral pneumonia such as COVID-19     1/18 CXR: ET tube and enteric tube remain in position  Probable mild pulmonary vascular congestion, improved  1/17 CXR: No evidence of pneumothorax  Otherwise no change from chest radiograph performed earlier in the day  1/17 CT abd/pelvis: Perihilar groundglass is favored to be on the basis of pulmonary edema rather than infection  Cardiomegaly is present  Right lower lobe endobronchial mucous plugging with associated atelectasis  Tip of the feeding tube is terminating in stomach with the sidehole located at the level of the gastroesophageal junction  Recommend advancement  No evidence for bowel obstruction  1/17 CT head: No acute intracranial abnormality  Stable areas of encephalomalacia as described  1/17 CXR: CHF versus fluid overload    4/17/19 VBS: Pt presents with mild-moderate oropharyngeal dysphagia characterized by reduced oral control with premature spillage, decreased bolus breakdown with solid foods, mildly delayed swallow initiation with mildly reduced hyolaryngeal elevation, and penetration/intermittent trace aspiration of thin liquids       Social/Education/Vocational Hx:  Pt lives in SNF/ECF    Swallow Information   Current Risks for Dysphagia & Aspiration: known history of dysphagia, known history of aspiration, recent intubation, AMS and change in respiratory status     Current Symptoms/Concerns: change in respiratory status    Current Diet: NPO - reg/thin after extubation- made NPO by RN after difficulty with meds     Baseline Diet: unknown- last known mechanical soft and nectar thick liquids      Baseline Assessment   Behavior/Cognition: alert    Speech/Language Status: able to participate in basic conversation and able to follow commands inconsistently    Patient Positioning: upright in bed    Swallow Mechanism Exam   Facial: masked facies  Labial: unable to test 2/2 limited command following  Lingual: unable to test 2/2 limited command following  Velum: unable to visualize  Mandible: adequate ROM  Dentition: edentulous (baseline)  Vocal quality:hoarse- patient reprots this is new since extubation   Volitional Cough: strong/productive   Resp: 40L HFNC        Consistencies Assessed and Performance   Consistencies Administered: nectar thick, puree and mechanical soft solids  Specific materials administered included: applesauce, soft bread ( no crust), nectar thick liquids    Oral Stage:  Oral acceptance and labial seal/straw suck appeared adequate  Mastication was prolonged and incomplete with soft solids with increased fatigue  Bolus formation and transfer were functional with no significant oral residue noted with puree or liquids- patient required liquid wash to transfer soft solids  Cannot r/o premature spill  Pharyngeal Stage:   Swallowing initiation appeared mildly delayed  Laryngeal rise was difficult to palpate given body habitus however suspect at least mildly reduced  No coughing, throat clearing, change in vocal quality or respiratory status noted today   SPO2 remained at 89-90 throughout and slightly increased WOB noted throughout- patient denied SOB     Esophageal Concerns: none reported    * Patient reports significant odynophagia 10/10- facial grimacing was noted throughout  Summary   s/s suggestive of moderate oral and suspected at least moderate pharyngeal dysphagia  Aspiration risk suspected to be increased at least with thin liquids and soft solids at this time given history of silent aspiration and current compromised respiratory status       Recommendations: puree/level 1 diet and nectar thick liquids     Recommended Form of Meds: crushed with puree     Aspiration precautions and compensatory swallowing strategies: upright posture, only feed when fully alert, slow rate of feeding, small bites/sips and alternating bites and sips    Results Reviewed with: patient, RN and MD     Dysphagia Goals: pt will tolerate dysph 2 with nectar thick liquids without s/s of aspiration x3    Plan  Will continue to follow    TERRANCE Flores , [id number] [place] [address]  Speech Language Pathologist   Available via [address] [place] [address] #[id number]  [place] #[id number]

## 2021-01-26 NOTE — ASSESSMENT & PLAN NOTE
Patient confirmed COVID-19 positive 01/16/2021  Initially presented to [address] [facility] with respiratory distress and altered mental status  While at [facility], was intubated due to hypercapnia and hypoxia  He became hypotensive requiring pressor therapy and was transferred to [facility] for further critical care management of severe COVID-19 infection    · Patient received 1st COVID-19 vaccine 1/8  · Chest CT with minimal ground-glass opacities and left lower lobe consolidation concerning for superimposed bacterial process  · Received convalescent plasma 1/17  · Continue vitamin cocktail, changed to once daily Decadron  · Was on 6 L nasal cannula until he he was found to be obtunded 1/24 with an acute respiratory acidosis and hypercapnic placed on BIPAP  · On HFNC presently, wean as able

## 2021-01-26 NOTE — ASSESSMENT & PLAN NOTE
Patient presented with acute hypoxic and hypercapnic respiratory failure requiring intubation and mechanical ventilation secondary to COVID-19 pneumonia  Patient extubated 1/18 and transferred out of ICU 1/20  Obtunded 1/24, stat ABG showed acute respiratory acidosis with hypercapnia  Patient likely also has a component of obesity hypoventilation syndrome/ANGELINA that is undiagnosed given his morbid obesity/body habitus

## 2021-01-26 NOTE — ASSESSMENT & PLAN NOTE
POA, likely 2/2 ATN in setting of COVID-19 + RUFINA + vanco tox   Baseline creatinine 1 0-1 3  · Creat has plateaued at 2 5 for past several days  · Renal following  · Continue bo cath, monitor UOP  · Has not been receiving oral diuretics 2/2 NPO status, renal considering IV

## 2021-01-26 NOTE — QUICK NOTE
Progress Note - Lucas 1 63 y o  male MRN: [id number]  Unit/Bed#: S -01 Encounter: [id number]      Subjective  Attempted ot see patient for continuity of care  Patient was quite sleepy  As per 1:1, patient had difficulty sleeping all night  Chart review events noted  Behavior over the last 24 hours: regressed  Sleep: hypersomnia  Appetite: adequate, decreased from baseline  Medication side effects: unable ot assess  ROS: Review of systems could not be obtained due to patient factors    Mental Status Exam:  Appearance:  unable to assess as patient was sleeping   Behavior:  laying in bed   Motor: no abnormal movements   Speech:  unablet o assess   Mood:  Unable to obtain due to patient factors   Affect:  unable to assess   Thought Process:  unable to assess due to patient factors   Thought Content: unable to assess due to patient factors   Perceptual disturbances: Cannot be assessed due to patient factors   Risk Potential: Low potential for aggression based on previous behavior, rest is unable to be assessed   Cognition: unable to assess   Insight:  unable to assess   Judgment: unable to assess     Risks, benefits and possible side effects of Medications:   Risks, benefits, and possible side effects of medications could not be explained to the patient due to inability to tolerate interview  Labs: I have personally reviewed all pertinent laboratory results         Assessment/Plan      Diagnosis:   MDD, severe, recurrent, without psychotic feautures    Recommended Treatment:   Will continue current treatment  Will reassess when more awake to participate in our interview    Natale Fleischer, MD    This note was not shared with the patient due to this is a psychotherapy note

## 2021-01-26 NOTE — ASSESSMENT & PLAN NOTE
Appears resolved  Hemoglobin stable    Continue to monitor closely  · Prn bo flushes   · Lovenox prophylactic dose and monitor for tolerance 20

## 2021-01-26 NOTE — ASSESSMENT & PLAN NOTE
Secondary to hypoxia and hypercapnia with history of stroke and residual left-sided weakness, patient initially was intubated at [facility] for both hypoxia and hypercapnia and was extubated in ICU here at [facility]  He has redeveloped hypercapnia and was obtunded this afternoon, improved with BIPAP  Now on HFNC  · Avoid sedating medications  · Melatonin q h s

## 2021-01-26 NOTE — PROGRESS NOTES
Patient Care Team:  Jono Bolden MD as PCP - General (Family Medicine)  Bala Burgess MD as PCP - Claims Attributed    Chief complaint right hip and leg pain with numbness    Subjective     History of Present Illness  Mr. Bond is a 65-year-old whose chief complaint is right hip and leg pain with numbness.  The patient has had a two-level interbody fusion at L3-4 and L4-5 with pedicle screw fixation by Dr. Gilmar grewal in March of this year, 2019 at [facility].  He notes that he has had persistent numbness and pain of his right thigh and foot which is relieved by lying down and worse when he stands to walk.  Patient has been on chronic Coumadin due to hypercoagulability.  His lumbar MRI shows prior midline laminectomy at L3-4 and L4-5.  With pedicle screws in good condition.  There appears to be relative lateral recess stenosis at the right at L3-4 and left at L4-5.  Review of Systems   Constitutional: Negative for chills and fever.   HENT: Negative for congestion and sore throat.    Respiratory: Negative for cough and wheezing.    Gastrointestinal: Negative for diarrhea and vomiting.   Musculoskeletal: Positive for arthralgias and back pain.   Neurological: Positive for weakness and numbness.        Past Medical History:   Diagnosis Date   • Arthritis    • Blood clot in vein     Right leg and heart   • Blood clotting disorder (CMS/HCC)    • CHF (congestive heart failure) (CMS/HCC)    • COPD (chronic obstructive pulmonary disease) (CMS/HCC)    • Coronary artery disease    • Diabetes mellitus (CMS/HCC)     check sugar once oer day   • Dizzy    • DVT (deep venous thrombosis) (CMS/HCC)    • Full dentures    • GERD (gastroesophageal reflux disease)    • Heart attack (CMS/HCC)    • Hx MRSA infection     right arm- 20 years ago- txed    • Hyperlipidemia    • Hypertension    • Migraine    • Neck pain     into both arms   • Numbness and tingling in both hands     from neck issues    • CAMERON treated  Progress Note - Infectious Disease   Nuha Lewis 63 y o  male MRN: [id number]  Unit/Bed#: S -01 Encounter: [id number]      Impression/Plan:  1    Mixed Gram Positive Blood cultures   Admission blood cultures were drawn on the 17th at [facility] with 1 of 2 sets growing 2 colony types of coag-negative staph   Repeat blood cultures were drawn on arrival to [address] [place] [address] 1 of 2 blood cultures here are growing Enterococcus faecalis and coag-negative staph  Blood culture contaminants highly suspected with 3 different colony types of CNS and 1 of Enterococcus in same set as CNS and known difficult IV/blood draw access   Patient has AVR 2014  TTE negative for vegetation  1/19/21 repeat blood cultures remain negative at 5 days  1/21 Vancomycin Trough 50 6 and Vancomycin Random 48 8,  1/26 Vancomycin Random level 23 5  Rec:  · Monitoring off additional antibiotic  · Patient with Vancomycin level supratherapeutic and still Vancomycin retention  · Recheck Vancomycin Random level with am labs  · Will await checking surveillance blood cultures until next week given Vancomycin retention  · Monitor temperature and hemodynamics  · Monitor CBC with diff  · Monitor BMP     2   Acute Respiratory Failure   Now more hypercapnic related  S/p successful extubation, but now with increased O2 requirements again  Appears to be multifactorial including pulmonary vascular congestion and COVID infection  1/26 PCXR multifocal parenchymal opacities with progression  Rec:  · Monitor respiratory status  · Monitor on O2 requirement on Bipap     3   COVID-19 infection   S/p successful extubation, but now with increased O2 requirements again  CT more consistent with pulmonary edema than ground-glass opacities   Inflammatory markers are low as below   Patient received 1st COVID vaccine on 01/08/2021   Patient's + COVID PCR at [facility] was reported the week of 1/11/21   CRP 30 > 8  Ddimer 1 15 > 0 81  Ferritin 31 > 28  1/26 PCXR multifocal parenchymal opacities with progression  Rec:  · Continues COVID aligorhythm with IV Decadron taper D10  · Continues Lipitor, MVI, vitamin-D   · Patient received convalescent plasma 01/17/2021  · No further COVID specific therapy indicated with symptoms starting 2 weeks ago and low inflammatory markers  4  PATRICK on CKD  1/26 Creatinine 2 71 > 2 54   Patient had been on HD a year in 2019 1/26 Vancomycin Random level 23 5  Rec:  · Monitor creatinine  · Recheck Vancomycin Random level with am labs  · Nephrology ongoing follow up     5  Morbid obesity   BMI 56 15 Increased risk factor for infection     5  EYR 3152 and s/p remote MSSA bacteremia s/p ceftaroline 6 week course through 3/10/19  Repeat blood cultures negative at 5 days  Rec:  · Check vancomycin random level  · Follow-up surveillance blood cultures once Vancomycin level down     7  Acute Depression  Possibly multifactorial in setting of PATRICK and on IV steroids  Rec:  · Care per primary care team  · Patient on 1 to 1 observation for safety  · Psychiatry following     Antibiotics:  Last Vancomycin dose 1/21/21     Above impression and plan discussed in detail with patient, RN, and primary care team      Subjective:  Patient is complaining of being so thirsty  He is requesting a large ginerale and water  He says he'd rather "be dead than not have water " Overnight his O2 stats dropped to 85% on 13 L, and he was placed back on bipap with O2 stats improving to mid90s  Repeat CXR showing multifocal parenchymal opacities with progression  He is NPO but he is not hungry, just thirsty  He has increased work of breathing with conversation    ROS: Patient has no high fever, chills, sweats overnight; no nausea, vomiting, diarrhea; no increased cough, no pain complaints       Objective:  Vitals:  Temp:  [98 2 °F (36 8 °C)-99 5 °F (37 5 °C)] 99 5 °F (37 5 °C)  HR:  [] 105  Resp:  [20-22] 22  BP: (114-144)/(68-81) 144/81  SpO2:  [88 %-97 %] 90 %  Temp (24hrs), with BiPAP     setting exhale 10-20 inhale auto 25    • Wears glasses      Past Surgical History:   Procedure Laterality Date   • ANKLE OPEN REDUCTION INTERNAL FIXATION     • ANTERIOR CERVICAL DISCECTOMY W/ FUSION N/A 2018    Procedure: CERVICAL DISCECTOMY ANTERIOR WITH FUSION C4-5 WITH CAGE AND ANTERIOR PLATING;  Surgeon: Aleksey Suarez MD;  Location: [facility];  Service: Orthopedic Spine   • APPENDECTOMY     • CORONARY ANGIOPLASTY WITH STENT PLACEMENT      x1   • ENDOSCOPY     • JOINT REPLACEMENT      bilat knee    • LUMBAR FUSION  2019    L3-5 fusion Dr. Canchola   • NECK SURGERY       Family History   Problem Relation Age of Onset   • Osteoarthritis Mother    • Diabetes Mother    • Osteoarthritis Father      Social History     Tobacco Use   • Smoking status: Former Smoker     Packs/day: 1.00     Years: 30.00     Pack years: 30.00     Types: Cigarettes     Last attempt to quit:      Years since quittin.9   • Smokeless tobacco: Never Used   Substance Use Topics   • Alcohol use: No   • Drug use: No       Objective      Vital Signs  Temp:  [97.2 °F (36.2 °C)] 97.2 °F (36.2 °C)    Physical Exam   Constitutional: He is oriented to person, place, and time. He appears well-developed and well-nourished.   HENT:   Head: Normocephalic and atraumatic.   Eyes: Conjunctivae and EOM are normal.   Neck: Normal range of motion. Neck supple.   Neurological: He is alert and oriented to person, place, and time.   Skin: Skin is warm and dry.       Results Review:   I reviewed the patient's new clinical results.      Assessment/Plan       Post laminectomy syndrome      Assessment & Plan  Mr. Bond will undergo a lumbar myelogram to assess if there is nerve root compression at the lateral recess on the right side.  Patient will follow-up with Dr. Reyes after myelogram.    I discussed the patients findings and my recommendations with patient    Chanda Zimmerman PA-C  19  8:04 AM       Av 7 °F (37 1 °C), Min:98 2 °F (36 8 °C), Max:99 5 °F (37 5 °C)  Current: Temperature: 99 5 °F (37 5 °C)    General Appearance:  Awake, alert, cooperative to exam, resting in bed in soft upper extremity restraints, + work of breathing in short statements with conversation  He is on 1 to 1 observation at present  Throat: Oropharynx moist without lesions  Lungs:   Decreased breath sounds throughout to auscultation bilaterally with scattered wheeze and crepitation; positive work of breathing, on high-flow nasal cannula at 15 L   Heart:  Tachy S1-S2 decreased tones   Abdomen:   Soft, non-tender, protuberant, positive bowel sounds  Extremities: + LE edema, left arm midline site nontender   : Turner with clear, yellow urine in bag and some sediment in tubing   Skin: Positive dry skin with scattered areas of flaking and blotchy eruption on chest and arms  Labs, Imaging, & Other studies:   All pertinent labs and imaging studies were personally reviewed  Results from last 7 days   Lab Units 21  0025 21  0438 21  1411  21  0532   WBC Thousand/uL 6 13 9 95  --   --  10 68*   HEMOGLOBIN g/dL 11 0* 11 2*  --   --  10 9*   I STAT HEMOGLOBIN g/dl  --   --  12 9   < >  --    PLATELETS Thousands/uL 165 148*  --   --  123*    < > = values in this interval not displayed       Results from last 7 days   Lab Units 21  0522 21  0025  21  1411  21  0450 21  0527   POTASSIUM mmol/L 3 2* 3 5   < >  --    < > 3 1* 3 4*   CHLORIDE mmol/L 107 108   < >  --    < > 102 104   CO2 mmol/L 28 29   < >  --    < > 27 28   CO2, I-STAT mmol/L  --   --   --  33*   < >  --   --    BUN mg/dL 72* 74*   < >  --    < > 71* 69*   CREATININE mg/dL 2 54* 2 71*   < >  --    < > 2 56* 2 40*   EGFR ml/min/1 73sq m 26 24   < >  --    < > 26 28   GLUCOSE, ISTAT mg/dl  --   --   --  131   < >  --   --    CALCIUM mg/dL 8 8 8 7   < >  --    < > 7 7* 7 9*   AST U/L  --  21  --   --   --  19 11   ALT U/L  --  33  --   --   --  28 31   ALK PHOS U/L  --  62  --   --   --  62 73    < > = values in this interval not displayed  Results from last 7 days   Lab Units 01/21/21  0533 01/20/21  0639   PROCALCITONIN ng/ml 0 11 0 09     Results from last 7 days   Lab Units 01/19/21  1140   BLOOD CULTURE  No Growth After 5 Days  No Growth After 5 Days

## 2021-01-26 NOTE — PROGRESS NOTES
Progress Note - Lay Catherine 1960, 63 y o  male MRN: [id number]    Unit/Bed#: S -01 Encounter: [id number]    Primary Care Provider: Jose Guadalupe Pelayo DO   Date and time admitted to hospital: 1/17/2021  4:16 PM        * Pneumonia due to COVID-19 virus  Assessment & Plan  Patient confirmed COVID-19 positive 01/16/2021  Initially presented to [facility] [facility] with respiratory distress and altered mental status  While at [facility], was intubated due to hypercapnia and hypoxia  He became hypotensive requiring pressor therapy and was transferred to [facility] for further critical care management of severe COVID-19 infection  · Patient received 1st COVID-19 vaccine 1/8  · Chest CT with minimal ground-glass opacities and left lower lobe consolidation concerning for superimposed bacterial process  · Received convalescent plasma 1/17  · Continue vitamin cocktail, changed to once daily Decadron  · Was on 6 L nasal cannula until he he was found to be obtunded 1/24 with an acute respiratory acidosis and hypercapnic placed on BIPAP  · On HFNC presently, wean as able     Acute respiratory failure with hypoxia and hypercarbia [facility])  Assessment & Plan  Patient presented with acute hypoxic and hypercapnic respiratory failure requiring intubation and mechanical ventilation secondary to COVID-19 pneumonia  Patient extubated 1/18 and transferred out of ICU 1/20  Obtunded 1/24, stat ABG showed acute respiratory acidosis with hypercapnia  Patient likely also has a component of obesity hypoventilation syndrome/ANGELINA that is undiagnosed given his morbid obesity/body habitus      Bacteremia  Assessment & Plan  Mixed Gram Positive Blood cultures   Admission blood cultures were drawn on the 17th at [facility] with 1 of 2 sets growing 2 colony types of coag-negative staph   Repeat blood cultures were drawn on arrival to [address] [place] [address] 1 of 2 blood cultures here are growing Enterococcus faecalis and coag-negative staph  Blood culture contaminants highly suspected with 3 different colony types of CNS and 1 of Enterococcus in same set as CNS and known difficult IV/blood draw access  · ID following, input appreciated  · Has hx of AVR 2014, TTE negative for vegetations  · 1/19/21 blood cultures negative x 5 days  · Monitoring off abx at present; still with supra therapeutic blood levels of vancomycin and vanco retention  · Will need repeat blood cultures next week once retained vanco out of system     Acute kidney injury superimposed on CKD [facility])  Assessment & Plan  POA, likely 2/2 ATN in setting of COVID-19 + RUFINA + vanco tox  Baseline creatinine 1 0-1 3  · Creat has plateaued at 2 5 for past several days  · Renal following  · Continue bo cath, monitor UOP  · Has not been receiving oral diuretics 2/2 NPO status, renal considering IV     Hematuria  Assessment & Plan  Appears resolved  Hemoglobin stable  Continue to monitor closely  · Prn bo flushes   · Lovenox prophylactic dose and monitor for tolerance    Atrial fibrillation ([facility] [address] )  Assessment & Plan  Had an episode of AFib with RVR and associated hypotension overnight with rapid response  Responded favorably to IV Lopressor  This is likely due to the fact that patient missed, his beta-blocker doses evening due to hold parameters not being met which have since been adjusted  · HRs now elevated as he has not been receiving PO medications due to NPO  · Per last cardiology note 10/2020 Pt was not on AC due to life threatening GI bleed  Dr Hillary Jovel has spoken with Pt on multiple occasions about Watchman device pt has been declining  · Will continue lovenox prophylactic 40 mg daily, would ideally need higher dosing due to weight though cannot in the setting of his renal dysfunction, will increase Lovenox as renal function improves    Depression  Assessment & Plan  Possibly multifactorial in setting of PATRICK and on IV steroids  · Psych following  · On 1:1      Morbid obesity   Assessment & Plan  · Recommend weight loss      Dysphagia  Assessment & Plan  Noted to be coughing with meds on 1/25  · Made NPO  · Speech consulted     Acute metabolic encephalopathy  Assessment & Plan  Secondary to hypoxia and hypercapnia with history of stroke and residual left-sided weakness, patient initially was intubated at [facility] for both hypoxia and hypercapnia and was extubated in ICU here at [facility]  He has redeveloped hypercapnia and was obtunded this afternoon, improved with BIPAP  Now on HFNC  · Avoid sedating medications  · Melatonin q h s  Hyperlipidemia  Assessment & Plan  · Continue statin    Essential hypertension  Assessment & Plan  BP stable, weaned off vasopressors  · Has not been getting PO medications due to NPO status      TE Pharmacologic Prophylaxis: Enoxaparin (Lovenox)    Patient Centered Rounds: I have performed bedside rounds with nursing staff today  Discussions with Specialists or Other Care Team Provider:  Spoke with nursing  Education and Discussions with Family / Patient:  Spoke in detail with patient  Current Length of Stay: 9 day(s)  Current Patient Status: Inpatient     Certification Statement: The patient will continue to require additional inpatient hospital stay due to Pneumonia due to COVID-19 virus  Discharge Plan / Estimated Discharge Date:  3-4 days    Code Status: Level 1 - Full Code  ______________________________________________________________________________    Subjective:   Patient seen and examined by me  Patient is feeling weak and the weakness is generalized  No chest pain, palpitations or diaphoresis at this point of time  No nausea or vomiting  Patient does not have any chills or rigors at this point of time  Patient's confusion is better and we are discontinuing the one-to-one for the patient  Does still have some shortness of breath but is off BiPAP and is on high-flow oxygen right now      Objective:   Vitals: Blood pressure 144/81, pulse (!) 127, temperature 99 °F (37 2 °C), temperature source Oral, resp  rate 20, height 5' 11" (1 803 m), weight (!) 181 kg (398 lb 13 oz), SpO2 93 %      Physical Exam:   General appearance: alert, appears stated age and cooperative  Constitutional- looks a little weak  HEENT - atraumatic and normocephalic  Neck- supple  Skin - no fresh rash  Extremities no fresh focal deformities  Cardiovascular- S1-S2 heard  Respiratory- bilateral air entry present, no crackles or rhonchi, minimal use of accessory muscles of breathing  Skin - no fresh rash  Abdomen - normal bowel sounds present, no rebound tenderness  CNS- No fresh focal deficits  Psych- no acute psychosis     Additional Data:   Labs:  Results from last 7 days   Lab Units 01/26/21  0025 01/25/21  0438 01/24/21  1411 01/24/21  0629 01/24/21  0532 01/23/21  0450 01/22/21  0527 01/20/21  0639   WBC Thousand/uL 6 13 9 95  --   --  10 68* 9 56 8 75 4 79   HEMOGLOBIN g/dL 11 0* 11 2*  --   --  10 9* 11 0* 12 2 11 1*   I STAT HEMOGLOBIN g/dl  --   --  12 9 11 9*  --   --   --   --    HEMATOCRIT % 37 1 37 8  --   --  37 8 38 4 43 8 39 5   HEMATOCRIT, ISTAT %  --   --  38 35*  --   --   --   --    MCV fL 78* 78*  --   --  78* 80* 83 81*   PLATELETS Thousands/uL 165 148*  --   --  123* 108* 119* 148*     Results from last 7 days   Lab Units 01/26/21  0522 01/26/21  0025 01/25/21  4898 01/24/21  1411 01/24/21  0629 01/24/21  0532 01/23/21  0450 01/22/21  0527 01/20/21  0639   SODIUM mmol/L 146* 146* 143  --   --  137 137 139 135*   POTASSIUM mmol/L 3 2* 3 5 3 5  --   --  3 4* 3 1* 3 4* 3 7   CHLORIDE mmol/L 107 108 105  --   --  105 102 104 104   CO2 mmol/L 28 29 26  --   --  24 27 28 24   CO2, I-STAT mmol/L  --   --   --  33* 29  --   --   --   --    ANION GAP mmol/L 11 9 12  --   --  8 8 7 7   BUN mg/dL 72* 74* 74*  --   --  70* 71* 69* 50*   CREATININE mg/dL 2 54* 2 71* 2 49*  --   --  2 48* 2 56* 2 40* 1 76*   CALCIUM mg/dL 8 8 8 7 8 6  --   --  8 3 7 7* 7 9* 8 1*   ALBUMIN g/dL  -- 2 5*  --   --   --   --  2 6* 3 0* 2 7*   TOTAL BILIRUBIN mg/dL  --  0 54  --   --   --   --  0 39 0 32 0 30   ALK PHOS U/L  --  62  --   --   --   --  62 73 69   ALT U/L  --  33  --   --   --   --  28 31 28   AST U/L  --  21  --   --   --   --  19 11 21   EGFR ml/min/1 73sq m 26 24 27  --   --  27 26 28 41   GLUCOSE RANDOM mg/dL 89 101 92  --   --  99 125 88 136     Results from last 7 days   Lab Units 01/25/21  0438 01/24/21  0532 01/20/21  0639   MAGNESIUM mg/dL 2 9* 2 2 2 5              Results from last 7 days   Lab Units 01/21/21  0533   PROCALCITONIN ng/ml 0 11     Results from last 7 days   Lab Units 01/26/21  1135 01/26/21  0740 01/25/21  2341 01/25/21  1801 01/25/21  1207 01/25/21  0612 01/24/21  0043 01/22/21  1753 01/22/21  1345 01/20/21  0006 01/19/21  1736   POC GLUCOSE mg/dl 114 92 89 120 108 116 109 167* 128 151* 159*             * I Have Reviewed All Lab Data Listed Above  Cultures:               Imaging:  Imaging Reports Reviewed Today Include:   Xr Chest Portable    Result Date: 1/18/2021  Impression: ET tube and enteric tube remain in position  Probable mild pulmonary vascular congestion, improved  Workstation performed: [id number]     Xr Chest 1 View Portable    Result Date: 1/18/2021  Impression: No evidence of pneumothorax  Otherwise no change from chest radiograph performed earlier in the day  Workstation performed: [id number]     Xr Chest 1 View Portable    Result Date: 1/18/2021  Impression: CHF versus fluid overload Workstation performed: [id number]     Xr Chest 1 View Portable    Result Date: 1/18/2021  Impression: CHF versus fluid overload Workstation performed: [id number]     Ct Head Without Contrast    Result Date: 1/17/2021  Impression: No acute intracranial abnormality  Stable areas of encephalomalacia as described   Workstation performed: [id number]     Pe Study With Ct Abdomen & Pelvis With Contrast    Result Date: 1/17/2021  Impression: Perihilar groundglass is favored to be on the basis of pulmonary edema rather than infection  Cardiomegaly is present  Right lower lobe endobronchial mucous plugging with associated atelectasis  Tip of the feeding tube is terminating in stomach with the sidehole located at the level of the gastroesophageal junction  Recommend advancement  No evidence for bowel obstruction  The study was marked in [facility] for immediate notification   Workstation performed: [id number]     Scheduled Meds:  Current Facility-Administered Medications   Medication Dose Route Frequency Provider Last Rate    acetaminophen  325 mg Rectal Q6H PRN Nicole Joshua PA-C      aspirin  81 mg Oral Daily Yareli Haynes PA-C      atorvastatin  40 mg Per NG Tube HS Yareli Haynes PA-C      bisacodyl  10 mg Rectal Daily PRN Yareli Haynes PA-C      bumetanide  2 mg Oral Daily Yareli Haynes PA-C      chlorhexidine  15 mL Swish & Spit Q12H [address] Yareli Haynes PA-C      cholecalciferol  2,000 Units Per NG Tube Daily Yareli Haynes PA-C      dexamethasone  6 mg Intravenous Daily Lionel Robbins PA-C      diltiazem  90 mg Oral Q12H [address] Sonny Dias MD      docusate sodium  100 mg Oral BID KATHY Gage      enoxaparin  40 mg Subcutaneous Q24H [address] Lionel Robbins PA-C      melatonin  3 mg Oral HS PRN KATHY Gage      methocarbamol  500 mg Oral Q8H PRN KATHY Gage      metoprolol  5 mg Intravenous Q6H PRN Sonny Dias MD      metoprolol tartrate  50 mg Oral TID Nicole Joshua PA-C      multivitamin with iron-minerals  15 mL Per NG Tube Daily Yareli Haynes PA-C      pantoprazole  40 mg Oral Early Morning Yareli Haynes PA-C      polyethylene glycol  17 g Oral BID KATHY Gage      potassium chloride  40 mEq Oral Once Cristopher Carnes PA-C      sertraline  125 mg Oral Daily Ursula Reese MD      sodium chloride  1,000 mL Intravenous Once Nicole Joshua PA-C      traMADol  50 mg Oral Once KATHY Del Rio MD [address]  [facility]    ** Please Note: This note has been constructed using a voice recognition system   **

## 2021-01-27 PROBLEM — E87.0 HYPERNATREMIA: Status: ACTIVE | Noted: 2021-01-27

## 2021-01-27 PROBLEM — D64.9 ANEMIA: Status: ACTIVE | Noted: 2021-01-27

## 2021-01-27 LAB
ANION GAP SERPL CALCULATED.3IONS-SCNC: 6 MMOL/L (ref 4–13)
BASOPHILS # BLD AUTO: 0 THOUSANDS/ΜL (ref 0–0.1)
BASOPHILS NFR BLD AUTO: 0 % (ref 0–1)
BUN SERPL-MCNC: 70 MG/DL (ref 5–25)
CALCIUM SERPL-MCNC: 8.7 MG/DL (ref 8.3–10.1)
CHLORIDE SERPL-SCNC: 112 MMOL/L (ref 100–108)
CO2 SERPL-SCNC: 30 MMOL/L (ref 21–32)
CREAT SERPL-MCNC: 2.4 MG/DL (ref 0.6–1.3)
CRP SERPL QL: 227.7 MG/L
D DIMER PPP FEU-MCNC: 1.19 UG/ML FEU
EOSINOPHIL # BLD AUTO: 0 THOUSAND/ΜL (ref 0–0.61)
EOSINOPHIL NFR BLD AUTO: 0 % (ref 0–6)
ERYTHROCYTE [DISTWIDTH] IN BLOOD BY AUTOMATED COUNT: 18.4 % (ref 11.6–15.1)
FERRITIN SERPL-MCNC: 579 NG/ML (ref 8–388)
GFR SERPL CREATININE-BSD FRML MDRD: 28 ML/MIN/1.73SQ M
GLUCOSE SERPL-MCNC: 102 MG/DL (ref 65–140)
GLUCOSE SERPL-MCNC: 106 MG/DL (ref 65–140)
GLUCOSE SERPL-MCNC: 127 MG/DL (ref 65–140)
GLUCOSE SERPL-MCNC: 128 MG/DL (ref 65–140)
GLUCOSE SERPL-MCNC: 144 MG/DL (ref 65–140)
GLUCOSE SERPL-MCNC: 197 MG/DL (ref 65–140)
HCT VFR BLD AUTO: 36.7 % (ref 36.5–49.3)
HGB BLD-MCNC: 10.7 G/DL (ref 12–17)
IMM GRANULOCYTES # BLD AUTO: 0.05 THOUSAND/UL (ref 0–0.2)
IMM GRANULOCYTES NFR BLD AUTO: 1 % (ref 0–2)
LYMPHOCYTES # BLD AUTO: 0.19 THOUSANDS/ΜL (ref 0.6–4.47)
LYMPHOCYTES NFR BLD AUTO: 4 % (ref 14–44)
MAGNESIUM SERPL-MCNC: 2.8 MG/DL (ref 1.6–2.6)
MCH RBC QN AUTO: 23 PG (ref 26.8–34.3)
MCHC RBC AUTO-ENTMCNC: 29.2 G/DL (ref 31.4–37.4)
MCV RBC AUTO: 79 FL (ref 82–98)
MONOCYTES # BLD AUTO: 0.37 THOUSAND/ΜL (ref 0.17–1.22)
MONOCYTES NFR BLD AUTO: 9 % (ref 4–12)
NEUTROPHILS # BLD AUTO: 3.76 THOUSANDS/ΜL (ref 1.85–7.62)
NEUTS SEG NFR BLD AUTO: 86 % (ref 43–75)
NRBC BLD AUTO-RTO: 0 /100 WBCS
PLATELET # BLD AUTO: 180 THOUSANDS/UL (ref 149–390)
PMV BLD AUTO: 11.4 FL (ref 8.9–12.7)
POTASSIUM SERPL-SCNC: 3.6 MMOL/L (ref 3.5–5.3)
RBC # BLD AUTO: 4.65 MILLION/UL (ref 3.88–5.62)
SODIUM SERPL-SCNC: 148 MMOL/L (ref 136–145)
VANCOMYCIN SERPL-MCNC: 16.7 UG/ML
WBC # BLD AUTO: 4.37 THOUSAND/UL (ref 4.31–10.16)

## 2021-01-27 PROCEDURE — 99232 SBSQ HOSP IP/OBS MODERATE 35: CPT | Performed by: INTERNAL MEDICINE

## 2021-01-27 PROCEDURE — 80048 BASIC METABOLIC PNL TOTAL CA: CPT | Performed by: INTERNAL MEDICINE

## 2021-01-27 PROCEDURE — 85025 COMPLETE CBC W/AUTO DIFF WBC: CPT | Performed by: INTERNAL MEDICINE

## 2021-01-27 PROCEDURE — 82728 ASSAY OF FERRITIN: CPT | Performed by: INTERNAL MEDICINE

## 2021-01-27 PROCEDURE — 94760 N-INVAS EAR/PLS OXIMETRY 1: CPT

## 2021-01-27 PROCEDURE — 94762 N-INVAS EAR/PLS OXIMTRY CONT: CPT

## 2021-01-27 PROCEDURE — 80202 ASSAY OF VANCOMYCIN: CPT | Performed by: INTERNAL MEDICINE

## 2021-01-27 PROCEDURE — 92526 ORAL FUNCTION THERAPY: CPT

## 2021-01-27 PROCEDURE — 82948 REAGENT STRIP/BLOOD GLUCOSE: CPT

## 2021-01-27 PROCEDURE — 86140 C-REACTIVE PROTEIN: CPT | Performed by: INTERNAL MEDICINE

## 2021-01-27 PROCEDURE — 83735 ASSAY OF MAGNESIUM: CPT | Performed by: INTERNAL MEDICINE

## 2021-01-27 PROCEDURE — 85379 FIBRIN DEGRADATION QUANT: CPT | Performed by: INTERNAL MEDICINE

## 2021-01-27 RX ORDER — BUMETANIDE 1 MG/1
2 TABLET ORAL 2 TIMES DAILY
Status: DISCONTINUED | OUTPATIENT
Start: 2021-01-27 | End: 2021-02-02

## 2021-01-27 RX ORDER — POTASSIUM CHLORIDE 20 MEQ/1
20 TABLET, EXTENDED RELEASE ORAL ONCE
Status: COMPLETED | OUTPATIENT
Start: 2021-01-27 | End: 2021-01-27

## 2021-01-27 RX ORDER — DEXTROSE MONOHYDRATE 50 MG/ML
50 INJECTION, SOLUTION INTRAVENOUS CONTINUOUS
Status: DISCONTINUED | OUTPATIENT
Start: 2021-01-27 | End: 2021-01-29

## 2021-01-27 RX ADMIN — DILTIAZEM HYDROCHLORIDE 90 MG: 90 CAPSULE, EXTENDED RELEASE ORAL at 22:08

## 2021-01-27 RX ADMIN — ATORVASTATIN CALCIUM 40 MG: 40 TABLET, FILM COATED ORAL at 22:07

## 2021-01-27 RX ADMIN — CHLORHEXIDINE GLUCONATE 0.12% ORAL RINSE 15 ML: 1.2 LIQUID ORAL at 09:14

## 2021-01-27 RX ADMIN — BUMETANIDE 2 MG: 1 TABLET ORAL at 09:14

## 2021-01-27 RX ADMIN — DEXAMETHASONE SODIUM PHOSPHATE 6 MG: 4 INJECTION INTRA-ARTICULAR; INTRALESIONAL; INTRAMUSCULAR; INTRAVENOUS; SOFT TISSUE at 09:14

## 2021-01-27 RX ADMIN — ASPIRIN 81 MG: 81 TABLET ORAL at 09:14

## 2021-01-27 RX ADMIN — DEXTROSE 75 ML/HR: 5 SOLUTION INTRAVENOUS at 20:17

## 2021-01-27 RX ADMIN — DOCUSATE SODIUM 100 MG: 100 CAPSULE, LIQUID FILLED ORAL at 09:14

## 2021-01-27 RX ADMIN — BUMETANIDE 2 MG: 1 TABLET ORAL at 17:33

## 2021-01-27 RX ADMIN — POTASSIUM CHLORIDE 20 MEQ: 1500 TABLET, EXTENDED RELEASE ORAL at 16:31

## 2021-01-27 RX ADMIN — DOCUSATE SODIUM 100 MG: 100 CAPSULE, LIQUID FILLED ORAL at 16:31

## 2021-01-27 RX ADMIN — PANTOPRAZOLE SODIUM 40 MG: 40 TABLET, DELAYED RELEASE ORAL at 09:15

## 2021-01-27 RX ADMIN — CHLORHEXIDINE GLUCONATE 0.12% ORAL RINSE 15 ML: 1.2 LIQUID ORAL at 22:07

## 2021-01-27 RX ADMIN — MULTIVITAMIN 15 ML: LIQUID ORAL at 09:15

## 2021-01-27 RX ADMIN — DILTIAZEM HYDROCHLORIDE 90 MG: 90 CAPSULE, EXTENDED RELEASE ORAL at 09:15

## 2021-01-27 RX ADMIN — ENOXAPARIN SODIUM 40 MG: 40 INJECTION SUBCUTANEOUS at 09:13

## 2021-01-27 RX ADMIN — SERTRALINE 125 MG: 25 TABLET, FILM COATED ORAL at 09:14

## 2021-01-27 RX ADMIN — DEXTROSE 100 ML/HR: 5 SOLUTION INTRAVENOUS at 10:19

## 2021-01-27 RX ADMIN — METOPROLOL TARTRATE 50 MG: 50 TABLET, FILM COATED ORAL at 22:07

## 2021-01-27 RX ADMIN — METOPROLOL TARTRATE 50 MG: 50 TABLET, FILM COATED ORAL at 16:31

## 2021-01-27 RX ADMIN — METOPROLOL TARTRATE 50 MG: 50 TABLET, FILM COATED ORAL at 09:14

## 2021-01-27 RX ADMIN — Medication 2000 UNITS: at 09:14

## 2021-01-27 NOTE — ASSESSMENT & PLAN NOTE
Possibly multifactorial in setting of PATRICK and on IV steroids  · Psychiatrist on board  · Previously on 1 is to 1 observation  · Continue Zoloft

## 2021-01-27 NOTE — ASSESSMENT & PLAN NOTE
Appears resolved  Hemoglobin stable    Continue to monitor closely  · Prn bo flushes   · Lovenox prophylactic dose and monitor for tolerance

## 2021-01-27 NOTE — PLAN OF CARE
Problem: Prexisting or High Potential for Compromised Skin Integrity  Goal: Skin integrity is maintained or improved  Description: INTERVENTIONS:  - Identify patients at risk for skin breakdown  - Assess and monitor skin integrity  - Assess and monitor nutrition and hydration status  - Monitor labs   - Assess for incontinence   - Turn and reposition patient  - Assist with mobility/ambulation  - Relieve pressure over bony prominences  - Avoid friction and shearing  - Provide appropriate hygiene as needed including keeping skin clean and dry  - Evaluate need for skin moisturizer/barrier cream  - Collaborate with interdisciplinary team   - Patient/family teaching  - Consider wound care consult   Outcome: Progressing     Problem: Potential for Falls  Goal: Patient will remain free of falls  Description: INTERVENTIONS:  - Assess patient frequently for physical needs  -  Identify cognitive and physical deficits and behaviors that affect risk of falls  -  [place] fall precautions as indicated by assessment   - Educate patient/family on patient safety including physical limitations  - Instruct patient to call for assistance with activity based on assessment  - Modify environment to reduce risk of injury  - Consider OT/PT consult to assist with strengthening/mobility  Outcome: Progressing     Problem: Nutrition/Hydration-ADULT  Goal: Nutrient/Hydration intake appropriate for improving, restoring or maintaining nutritional needs  Description: Monitor and assess patient's nutrition/hydration status for malnutrition  Collaborate with interdisciplinary team and initiate plan and interventions as ordered  Monitor patient's weight and dietary intake as ordered or per policy  Utilize nutrition screening tool and intervene as necessary  Determine patient's food preferences and provide high-protein, high-caloric foods as appropriate       INTERVENTIONS:  - Monitor oral intake, urinary output, labs, and treatment plans  - Assess nutrition and hydration status and recommend course of action  - Evaluate amount of meals eaten  - Assist patient with eating if necessary   - Allow adequate time for meals  - Recommend/ encourage appropriate diets, oral nutritional supplements, and vitamin/mineral supplements  - Order, calculate, and assess calorie counts as needed  - Recommend, monitor, and adjust tube feedings and TPN/PPN based on assessed needs  - Assess need for intravenous fluids  - Provide specific nutrition/hydration education as appropriate  - Include patient/family/caregiver in decisions related to nutrition  Outcome: Progressing     Problem: NEUROSENSORY - ADULT  Goal: Achieves stable or improved neurological status  Description: INTERVENTIONS  - Monitor and report changes in neurological status  - Monitor vital signs such as temperature, blood pressure, glucose, and any other labs ordered   - Initiate measures to prevent increased intracranial pressure  - Monitor for seizure activity and implement precautions if appropriate      Outcome: Progressing  Goal: Achieves maximal functionality and self care  Description: INTERVENTIONS  - Monitor swallowing and airway patency with patient fatigue and changes in neurological status  - Encourage and assist patient to increase activity and self care     - Encourage visually impaired, hearing impaired and aphasic patients to use assistive/communication devices  Outcome: Progressing     Problem: CARDIOVASCULAR - ADULT  Goal: Maintains optimal cardiac output and hemodynamic stability  Description: INTERVENTIONS:  - Monitor I/O, vital signs and rhythm  - Monitor for S/S and trends of decreased cardiac output  - Administer and titrate ordered vasoactive medications to optimize hemodynamic stability  - Assess quality of pulses, skin color and temperature  - Assess for signs of decreased coronary artery perfusion  - Instruct patient to report change in severity of symptoms  Outcome: Progressing  Goal: Absence of cardiac dysrhythmias or at baseline rhythm  Description: INTERVENTIONS:  - Continuous cardiac monitoring, vital signs, obtain 12 lead EKG if ordered  - Administer antiarrhythmic and heart rate control medications as ordered  - Monitor electrolytes and administer replacement therapy as ordered  Outcome: Progressing     Problem: RESPIRATORY - ADULT  Goal: Achieves optimal ventilation and oxygenation  Description: INTERVENTIONS:  - Assess for changes in respiratory status  - Assess for changes in mentation and behavior  - Position to facilitate oxygenation and minimize respiratory effort  - Oxygen administered by appropriate delivery if ordered  - Initiate smoking cessation education as indicated  - Encourage broncho-pulmonary hygiene including cough, deep breathe, Incentive Spirometry  - Assess the need for suctioning and aspirate as needed  - Assess and instruct to report SOB or any respiratory difficulty  - Respiratory Therapy support as indicated  Outcome: Progressing     Problem: GENITOURINARY - ADULT  Goal: Maintains or returns to baseline urinary function  Description: INTERVENTIONS:  - Assess urinary function  - Encourage oral fluids to ensure adequate hydration if ordered  - Administer IV fluids as ordered to ensure adequate hydration  - Administer ordered medications as needed  - Offer frequent toileting  - Follow urinary retention protocol if ordered  Outcome: Progressing  Goal: Absence of urinary retention  Description: INTERVENTIONS:  - Assess patients ability to void and empty bladder  - Monitor I/O  - Bladder scan as needed  - Discuss with physician/AP medications to alleviate retention as needed  - Discuss catheterization for long term situations as appropriate  Outcome: Progressing  Goal: Urinary catheter remains patent  Description: INTERVENTIONS:  - Assess patency of urinary catheter  - If patient has a chronic bo, consider changing catheter if non-functioning  - Follow guidelines for intermittent irrigation of non-functioning urinary catheter  Outcome: Progressing     Problem: METABOLIC, FLUID AND ELECTROLYTES - ADULT  Goal: Electrolytes maintained within normal limits  Description: INTERVENTIONS:  - Monitor labs and assess patient for signs and symptoms of electrolyte imbalances  - Administer electrolyte replacement as ordered  - Monitor response to electrolyte replacements, including repeat lab results as appropriate  - Instruct patient on fluid and nutrition as appropriate  Outcome: Progressing  Goal: Fluid balance maintained  Description: INTERVENTIONS:  - Monitor labs   - Monitor I/O and WT  - Instruct patient on fluid and nutrition as appropriate  - Assess for signs & symptoms of volume excess or deficit  Outcome: Progressing  Goal: Glucose maintained within target range  Description: INTERVENTIONS:  - Monitor Blood Glucose as ordered  - Assess for signs and symptoms of hyperglycemia and hypoglycemia  - Administer ordered medications to maintain glucose within target range  - Assess nutritional intake and initiate nutrition service referral as needed  Outcome: Progressing     Problem: MUSCULOSKELETAL - ADULT  Goal: Maintain or return mobility to safest level of function  Description: INTERVENTIONS:  - Assess patient's ability to carry out ADLs; assess patient's baseline for ADL function and identify physical deficits which impact ability to perform ADLs (bathing, care of mouth/teeth, toileting, grooming, dressing, etc )  - Assess/evaluate cause of self-care deficits   - Assess range of motion  - Assess patient's mobility  - Assess patient's need for assistive devices and provide as appropriate  - Encourage maximum independence but intervene and supervise when necessary  - Involve family in performance of ADLs  - Assess for home care needs following discharge   - Consider OT consult to assist with ADL evaluation and planning for discharge  - Provide patient education as appropriate  Outcome: Progressing  Goal: Maintain proper alignment of affected body part  Description: INTERVENTIONS:  - Support, maintain and protect limb and body alignment  - Provide patient/ family with appropriate education  Outcome: Progressing     Problem: INFECTION - ADULT  Goal: Absence or prevention of progression during hospitalization  Description: INTERVENTIONS:  - Assess and monitor for signs and symptoms of infection  - Monitor lab/diagnostic results  - Monitor all insertion sites, i e  indwelling lines, tubes, and drains  - Monitor endotracheal if appropriate and nasal secretions for changes in amount and color  - Delta appropriate cooling/warming therapies per order  - Administer medications as ordered  - Instruct and encourage patient and family to use good hand hygiene technique  - Identify and instruct in appropriate isolation precautions for identified infection/condition  Outcome: Progressing     Problem: DISCHARGE PLANNING  Goal: Discharge to home or other facility with appropriate resources  Description: INTERVENTIONS:  - Identify barriers to discharge w/patient and caregiver  - Arrange for needed discharge resources and transportation as appropriate  - Identify discharge learning needs (meds, wound care, etc )  - Arrange for interpretive services to assist at discharge as needed  - Refer to Case Management Department for coordinating discharge planning if the patient needs post-hospital services based on physician/advanced practitioner order or complex needs related to functional status, cognitive ability, or social support system  Outcome: Progressing     Problem: Knowledge Deficit  Goal: Patient/family/caregiver demonstrates understanding of disease process, treatment plan, medications, and discharge instructions  Description: Complete learning assessment and assess knowledge base    Interventions:  - Provide teaching at level of understanding  - Provide teaching via preferred learning methods  Outcome: Progressing

## 2021-01-27 NOTE — ASSESSMENT & PLAN NOTE
Noted to be coughing with meds on 1/25  · Previously NPO and today, speech therapist evaluated the patient  From her evaluation, patient may have pureed diet with nectar thick liquids

## 2021-01-27 NOTE — UTILIZATION REVIEW
Continued Stay Review  Date: 1/27/2021                       Current Patient Class: inpatient    Current Level of Care: level 2 stepdown icu  HPI:60 y o  male initially admitted on 1/17/2021 transferred from [address] [place] [address] ED to [address] ICU admitted as inpatient due to COVID 19 pneumonia and hypercapnic resp failure  Presented to [facility] ED from nursing home with altered mental status, fever, and resp distress, found to be COVID positive within the past week  Worsened over prior 24 hrs with mi 80's sat on 3 liters  Improved to 90% on 8 liters  Was somnolent with septic workup in progress    Assessment/Plan: 1/27/2021  Infectious Disease: significant SOB while on HFNC=40L NC, currently afebrile; O2 requirements higher; CRP higher, D Dmier elevated; repeat ferritin/inflammatory markers; creatinine slowly climbing down  Decline likely multifactorial; issues w breathing may be related to volume but re eval inflammatory markers given COVID dx  No role for antiviral therapy/plasma therapy  1/27/2021 Nephrology  PATRICK on CKD 3( Baseline creatinine 1-1 3)  POA likely secondary to ATN in setting of COVID 19 cytokine release & contrast induced nephropathy +/- Vanco toxicity  On exam, he appeared comfortable and resting comfortably  There is lower extremity swelling present  Increase Bumex 2 mg b i d  Hypernatremia: will begin D5W at 100ml/hr,   Agree with free water for hypernatremia decrease IV fluids 75 cc/hr  Replete potassium  Turner intact     Pertinent Labs/Diagnostic Results:       Results from last 7 days   Lab Units 01/27/21  0610 01/26/21  0025 01/25/21  0438 01/24/21  1411 01/24/21  0629   WBC Thousand/uL 4 37 6 13 9 95  --   --    HEMOGLOBIN g/dL 10 7* 11 0* 11 2*  --   --    I STAT HEMOGLOBIN g/dl  --   --   --  12 9 11 9*   HEMATOCRIT % 36 7 37 1 37 8  --   --    HEMATOCRIT, ISTAT %  --   --   --  38 35*   PLATELETS Thousands/uL 180 165 148*  --   --    NEUTROS ABS Thousands/µL 3 76 5 57 9 18*  -- --          Results from last 7 days   Lab Units 01/27/21  0611 01/26/21  0522 01/26/21  0025 01/25/21  0438 01/24/21  1411  01/24/21  0532   SODIUM mmol/L 148* 146* 146* 143  --   --  137   POTASSIUM mmol/L 3 6 3 2* 3 5 3 5  --   --  3 4*   CHLORIDE mmol/L 112* 107 108 105  --   --  105   CO2 mmol/L 30 28 29 26  --   --  24   CO2, I-STAT mmol/L  --   --   --   --  33*   < >  --    ANION GAP mmol/L 6 11 9 12  --   --  8   BUN mg/dL 70* 72* 74* 74*  --   --  70*   CREATININE mg/dL 2 40* 2 54* 2 71* 2 49*  --   --  2 48*   EGFR ml/min/1 73sq m 28 26 24 27  --   --  27   CALCIUM mg/dL 8 7 8 8 8 7 8 6  --   --  8 3   MAGNESIUM mg/dL 2 8*  --   --  2 9*  --   --  2 2    < > = values in this interval not displayed       Results from last 7 days   Lab Units 01/26/21  0025 01/23/21  0450 01/22/21  0527   AST U/L 21 19 11   ALT U/L 33 28 31   ALK PHOS U/L 62 62 73   TOTAL PROTEIN g/dL 6 4 6 0* 6 6   ALBUMIN g/dL 2 5* 2 6* 3 0*   TOTAL BILIRUBIN mg/dL 0 54 0 39 0 32     Results from last 7 days   Lab Units 01/27/21  1035 01/27/21  0726 01/26/21  2118 01/26/21  1538 01/26/21  1135 01/26/21  0740 01/25/21  2341 01/25/21  1801 01/25/21  1207 01/25/21  0612 01/24/21  0043 01/22/21  1753   POC GLUCOSE mg/dl 127 102 128 144* 114 92 89 120 108 116 109 167*     Results from last 7 days   Lab Units 01/27/21  0611 01/26/21  0522 01/26/21  0025 01/25/21  0438 01/24/21  0532 01/23/21  0450 01/22/21  0527   GLUCOSE RANDOM mg/dL 106 89 101 92 99 125 88             No results found for: BETA-HYDROXYBUTYRATE   Results from last 7 days   Lab Units 01/24/21  2331 01/24/21  1832 01/24/21  1626   PH ART  7 308* 7 270* 7 218*   PCO2 ART mm Hg 56 0* 60 7* 66 7*   PO2 ART mm Hg 81 7 77 8 77 8   HCO3 ART mmol/L 27 4 27 2 26 6   BASE EXC ART mmol/L 0 3 -0 6 -2 2   O2 CONTENT ART mL/dL 15 9* 15 5* 15 0*   O2 HGB, ARTERIAL % 95 3 94 6 92 9*   ABG SOURCE  Radial, Right Radial, Left Radial, Left         Results from last 7 days   Lab Units 01/24/21  1411 01/24/21  0629   I STAT BASE EXC mmol/L -2 -1   I STAT O2 SAT % 98* 99*   ISTAT PH ART  7 091* 7 266*   I STAT ART PCO2 mm HG 99 7* 60 3*   I STAT ART PO2 mm  0* 173 0*   I STAT ART HCO3 mmol/L 30 3* 27 4             Results from last 7 days   Lab Units 01/27/21  1019   D-DIMER QUANTITATIVE ug/ml FEU 1 19*             Results from last 7 days   Lab Units 01/21/21  0533   PROCALCITONIN ng/ml 0 11                     Results from last 7 days   Lab Units 01/27/21  0939   FERRITIN ng/mL 579*             Results from last 7 days   Lab Units 01/27/21  0939   CRP mg/L 227 7*         Results from last 7 days   Lab Units 01/22/21  1350   CLARITY UA  Cloudy   COLOR UA  Dark Neda   SPEC GRAV UA  1 020   PH UA  5 5   GLUCOSE UA mg/dl Negative   KETONES UA mg/dl Trace*   BLOOD UA  Large*   PROTEIN UA mg/dl 100 (2+)*   NITRITE UA  Negative   BILIRUBIN UA  Negative   UROBILINOGEN UA E U /dl 0 2   LEUKOCYTES UA  Trace*   WBC UA /hpf 4-10*   RBC UA /hpf Innumerable*   BACTERIA UA /hpf Moderate*   EPITHELIAL CELLS WET PREP /hpf Occasional   MUCUS THREADS  Occasional*               Results from last 7 days   Lab Units 01/23/21  1546   TOTAL COUNTED  100           Vital Signs:   Date/Time  Temp  Pulse  Resp  BP  MAP (mmHg)  SpO2  FiO2 (%)  Calculated FIO2 (%) - Nasal Cannula  O2 Flow Rate (L/min)  Nasal Cannula O2 Flow Rate (L/min)  O2 Device  O2 Interface Device  Patient Position - Orthostatic VS   01/27/21 1500  98 2 °F (36 8 °C)  82  18  126/67    91 %              Lying   01/27/21 1138            94 %  100    40 L/min    High flow nasal cannula  HFNC prongs     01/27/21 0803            92 %  100    40 L/min    High flow nasal cannula  HFNC prongs     01/27/21 0700  97 8 °F (36 6 °C)  86  23Abnormal   132/82  101  89 %Abnormal       40 L/min    High flow nasal cannula    Lying   01/27/21 0648              100    40 L/min    High flow nasal cannula       01/27/21 0311           92 %            HFNC prongs     01/27/21 0004            93 %  100    40 L/min    High flow nasal cannula         Medications:   Scheduled Medications:  aspirin, 81 mg, Oral, Daily  atorvastatin, 40 mg, Per NG Tube, HS  bumetanide, 2 mg, Oral, BID  chlorhexidine, 15 mL, Swish & Spit, Q12H [address]  cholecalciferol, 2,000 Units, Per NG Tube, Daily  dexamethasone, 6 mg, Intravenous, Daily  diltiazem, 90 mg, Oral, Q12H ARACELIS  docusate sodium, 100 mg, Oral, BID  enoxaparin, 40 mg, Subcutaneous, Q24H ARACELIS  metoprolol tartrate, 50 mg, Oral, TID  multivitamin with iron-minerals, 15 mL, Per NG Tube, Daily  pantoprazole, 40 mg, Oral, Early Morning  polyethylene glycol, 17 g, Oral, BID  potassium chloride, 20 mEq, Oral, Once  sertraline, 125 mg, Oral, Daily  sodium chloride, 1,000 mL, Intravenous, Once  traMADol, 50 mg, Oral, Once    Continuous IV Infusions:  dextrose, 75 mL/hr, Intravenous, Continuous    PRN Meds:  acetaminophen, 325 mg, Rectal, Q6H PRN  bisacodyl, 10 mg, Rectal, Daily PRN  melatonin, 3 mg, Oral, HS PRN  methocarbamol, 500 mg, Oral, Q8H PRN  metoprolol, 5 mg, Intravenous, Q6H PRN    Discharge Plan: d  Network Utilization Review Department  ATTENTION: Please call with any questions or concerns to [phone] and carefully listen to the prompts so that you are directed to the right person  All voicemails are confidential   Duane Butler all requests for admission clinical reviews, approved or denied determinations and any other requests to dedicated fax number below belonging to the campus where the patient is receiving treatment   List of dedicated fax numbers for the Facilities:  [address] [place] [address] DENIALS (Administrative/Medical Necessity) [phone]   [address] (Maternity/NICU/Pediatrics) 261 [facility] [phone] 8049 [facility] [phone]     [address] ([address]) [address] [place] [address] Radha Gallagher 1481 [phone]   [place] [address] [phone]

## 2021-01-27 NOTE — ASSESSMENT & PLAN NOTE
Mixed Gram Positive Blood cultures   Admission blood cultures were drawn on the 17th at [facility] with 1 of 2 sets growing 2 colony types of coag-negative staph   Repeat blood cultures were drawn on arrival to [address] [place] [address] 1 of 2 blood cultures here are growing Enterococcus faecalis and coag-negative staph  Blood culture contaminants highly suspected with 3 different colony types of CNS and 1 of Enterococcus in same set as CNS and known difficult IV/blood draw access  · ID following, input appreciated     · Has hx of AVR 2014, TTE negative for vegetations  · 1/19/21 blood cultures negative x 5 days  · Monitoring off abx at present; still with supra therapeutic blood levels of vancomycin and vanco retention  · Will need repeat blood cultures next week once retained vanco out of system

## 2021-01-27 NOTE — ASSESSMENT & PLAN NOTE
POA, likely 2/2 ATN in setting of COVID-19 + RUFINA + vanco tox  Baseline creatinine 1 0-1 3  · Creat has plateaued at 2 5 for past several days  · Renal following  · Continue bo cath, monitor UOP  · Today, as per nephrologist, decreased Bumex to 2 mg twice a day  Patient will have free water for patient's hypernatremia and decrease IV fluids to 75 mL/hr (D5 water)  · Monitor BMP  · Avoid nephrotoxins  · Avoid hypotension

## 2021-01-27 NOTE — PROGRESS NOTES
NEPHROLOGY PROGRESS NOTE   Catha Cable 63 y o  male MRN: [id number]  Unit/Bed#: S -01 Encounter: [id number]  Reason for Consult: PATRICK    ASSESSMENT/PLAN:  1  Acute Kidney Injury, POA- likely secondary to ATN in the setting of COVID 19 cytokine release + contrast induced nephropathy +/- vancomycin toxicity  - creatinine pleateaued at 2 5 for the past 4 days and today is slightly improved at 2 4  - UA: large blood, innumerable RBC, trace leuks, 4-10 wbc, moderate bacteria, trace ketones, 2+ protein, 2-3 coarse granular casts  - CPK: 76 on admission  - bo catheter in place, nonoliguric  2  Chronic Kidney disease stage III- Baseline creatinine 1-1 3  Follows with nephrologist in [facility]  3  Volume Status- he has not been receiving his oral bumex due to being NPO  - received IV bumex x1 yesterday and now able to take po so received oral bumex today  4  Hypokalemia- improved s/p relpletion  5  Hypernatremia- secondary to poor oral intake/NPO previously  - sodium worsening to 148 today  - will begin D5W at 100ml/hr  - calculated fluid deficit is 6L  - encourage oral intake  6  Anemia- mild  7  Hypertension- BP controlled  8  A fib- patient tachycardic  - has not received diltiazem at all due to NPO/bipap  - has not received metoprolol since 1/24 for one dose only  9  Bacteremia- ID on board  - off abx currently  - monitoring vancomycin level as it was elevated on 1/22  10  COVID 19 Infection- per primary team    Disposition:  Start D5W for hypernatremia    SUBJECTIVE:  Per nurse, patient has no acute complaints but is confused      OBJECTIVE:  Current Weight: Weight - Scale: (charted twice by mistake)  Vitals:    01/27/21 0311 01/27/21 0700 01/27/21 0803 01/27/21 1138   BP:  132/82     BP Location:  Right arm     Pulse:  86     Resp:  (!) 23     Temp:  97 8 °F (36 6 °C)     TempSrc:  Oral     SpO2: 92% (!) 89% 92% 94%   Weight:       Height:           Intake/Output Summary (Last 24 hours) at 1/27/2021 [address] filed at 1/27/2021 0250  Gross per 24 hour   Intake 100 ml   Output 1240 ml   Net -1140 ml     Due to COVID 19 infection, patient was seen through the window  Unable to effectively communicate as patient is confused per nurse  Patient waved to me through the window      General: NAD  Skin: no rash  Respiratory: not labored  Cardiac: regular on monitor  Extremities: significant edema per nurse  GI: not distended  Neuro: alert awake    Medications:    Current Facility-Administered Medications:     acetaminophen (TYLENOL) rectal suppository 325 mg, 325 mg, Rectal, Q6H PRN, Blas Spear PA-C    aspirin (ECOTRIN LOW STRENGTH) EC tablet 81 mg, 81 mg, Oral, Daily, Yareli Haynes PA-C, 81 mg at 01/27/21 0914    atorvastatin (LIPITOR) tablet 40 mg, 40 mg, Per NG Tube, HS, Yareli Haynes PA-C, 40 mg at 01/26/21 2112    bisacodyl (DULCOLAX) rectal suppository 10 mg, 10 mg, Rectal, Daily PRN, Yareli Haynes PA-C    bumetanide (BUMEX) tablet 2 mg, 2 mg, Oral, Daily, Yareli Haynes PA-C, 2 mg at 01/27/21 0914    chlorhexidine (PERIDEX) 0 12 % oral rinse 15 mL, 15 mL, Swish & Spit, Q12H [facility], Yareli Haynes PA-C, 15 mL at 01/27/21 0914    cholecalciferol (VITAMIN D3) tablet 2,000 Units, 2,000 Units, Per NG Tube, Daily, Yareli Haynes PA-C, 2,000 Units at 01/27/21 0914    dexamethasone (DECADRON) injection 6 mg, 6 mg, Intravenous, Daily, Lionel Robbins PA-C, 6 mg at 01/27/21 0914    dextrose 5 % infusion, 100 mL/hr, Intravenous, Continuous, [facility], Last Rate: 100 mL/hr at 01/27/21 1019, 100 mL/hr at 01/27/21 1019    diltiazem (CARDIZEM SR) 12 hr capsule 90 mg, 90 mg, Oral, Q12H [facility], Casey Prieto MD, 90 mg at 01/27/21 0915    docusate sodium (COLACE) capsule 100 mg, 100 mg, Oral, BID, KATHY Monzon, 100 mg at 01/27/21 0914    enoxaparin (LOVENOX) subcutaneous injection 40 mg, 40 mg, Subcutaneous, Q24H [facility], Lionel Robbins PA-C, 40 mg at 01/27/21 0913    melatonin tablet 3 mg, 3 mg, Oral, HS PRN, Guyann Mary, CRNP, 3 mg at 01/22/21 2201    methocarbamol (ROBAXIN) tablet 500 mg, 500 mg, Oral, Q8H PRN, Guyann Mary, CRNP, 500 mg at 01/23/21 1343    metoprolol (LOPRESSOR) injection 5 mg, 5 mg, Intravenous, Q6H PRN, Padmini Martinez MD, 5 mg at 01/26/21 1338    metoprolol tartrate (LOPRESSOR) tablet 50 mg, 50 mg, Oral, TID, Blas Spear PA-C, 50 mg at 01/27/21 0914    [COMPLETED] zinc sulfate (ZINCATE) capsule 220 mg, 220 mg, Per NG Tube, Daily, 220 mg at 01/24/21 0824 **FOLLOWED BY** multivitamin with iron-minerals liquid 15 mL, 15 mL, Per NG Tube, Daily, Yareli Haynes PA-C, 15 mL at 01/27/21 0915    pantoprazole (PROTONIX) EC tablet 40 mg, 40 mg, Oral, Early Morning, Yareli Haynes PA-C, 40 mg at 01/27/21 0915    polyethylene glycol (MIRALAX) packet 17 g, 17 g, Oral, BID, Guyanaline Mary, CRNP, 17 g at 01/26/21 2112    sertraline (ZOLOFT) tablet 125 mg, 125 mg, Oral, Daily, Jennifer Bateman MD, 125 mg at 01/27/21 0914    sodium chloride 0 9 % bolus 1,000 mL, 1,000 mL, Intravenous, Once, Blas Spear PA-C    traMADol Ramonia Morning) tablet 50 mg, 50 mg, Oral, Once, Guyann Mary, CRNP    Laboratory Results:  Results from last 7 days   Lab Units 01/27/21  0611 01/27/21  0610 01/26/21  0522 01/26/21  0025 01/25/21  0438 01/24/21  1411 01/24/21  0629 01/24/21  0532 01/23/21  0450 01/22/21  0527   WBC Thousand/uL  --  4 37  --  6 13 9 95  --   --  10 68* 9 56 8 75   HEMOGLOBIN g/dL  --  10 7*  --  11 0* 11 2*  --   --  10 9* 11 0* 12 2   I STAT HEMOGLOBIN g/dl  --   --   --   --   --  12 9 11 9*  --   --   --    HEMATOCRIT %  --  36 7  --  37 1 37 8  --   --  37 8 38 4 43 8   HEMATOCRIT, ISTAT %  --   --   --   --   --  38 35*  --   --   --    PLATELETS Thousands/uL  --  180  --  165 148*  --   --  123* 108* 119*   POTASSIUM mmol/L 3 6  --  3 2* 3 5 3 5  --   --  3 4* 3 1* 3 4*   CHLORIDE mmol/L 112*  --  107 108 105  --   --  105 102 104   CO2 mmol/L 30  --  28 29 26  --   --  24 27 28   CO2, I-STAT mmol/L  --   --   --   --   --  33* 29  --   --   --    BUN mg/dL 70*  --  72* 74* 74*  --   --  70* 71* 69*   CREATININE mg/dL 2 40*  --  2 54* 2 71* 2 49*  --   --  2 48* 2 56* 2 40*   CALCIUM mg/dL 8 7  --  8 8 8 7 8 6  --   --  8 3 7 7* 7 9*   MAGNESIUM mg/dL 2 8*  --   --   --  2 9*  --   --  2 2  --   --    GLUCOSE, ISTAT mg/dl  --   --   --   --   --  131 109  --   --   --

## 2021-01-27 NOTE — ASSESSMENT & PLAN NOTE
Had an episode of AFib with RVR and associated hypotension with rapid response  Responded favorably to IV Lopressor  This is likely due to the fact that patient missed, his beta-blocker doses evening due to hold parameters not being met which have since been adjusted  · Heart rate now is back to normal   · Per last cardiology note 10/2020 Pt was not on AC due to life threatening GI bleed  Dr Dahlia Montana has spoken with Pt on multiple occasions about Watchman device pt has been declining    · Will continue lovenox prophylactic 40 mg daily, would ideally need higher dosing due to weight though cannot in the setting of his renal dysfunction, will increase Lovenox as renal function improves

## 2021-01-27 NOTE — ASSESSMENT & PLAN NOTE
Resolved  Secondary to hypoxia and hypercapnia with history of stroke and residual left-sided weakness, patient initially was intubated at [facility] for both hypoxia and hypercapnia and was extubated in ICU here at [facility]  He has redeveloped hypercapnia and was obtunded this afternoon, improved with BIPAP  Now on HFNC  · Avoid sedating medications  · Melatonin q h s

## 2021-01-27 NOTE — PROGRESS NOTES
Progress Note - Infectious Disease   Lizet Friedman 63 y o  male MRN: [id number]  Unit/Bed#: S -01 Encounter: [id number]      Impression/Plan:  1    Mixed Gram Positive Blood cultures   Admission blood cultures were drawn on the 17th at [facility] with 1 of 2 sets growing 2 colony types of coag-negative staph   Repeat blood cultures were drawn on arrival to [address] [place] [address] 1 of 2 blood cultures here are growing Enterococcus faecalis and coag-negative staph  Blood culture contaminants highly suspected with 3 different colony types of CNS and 1 of Enterococcus in same set as CNS and known difficult IV/blood draw access   Patient has AVR 2014  TTE negative for vegetation  1/19/21 repeat blood cultures remain negative at 5 days  1/21 Vancomycin Trough 50 6 and Vancomycin Random 48 8,  1/27 Vancomycin Random level 26 7  Rec:  · Monitoring off additional antibiotic  · Patient with Vancomycin level therapeutic yet  · Recheck Vancomycin Random level with am labs  · Will await checking surveillance blood cultures until Vancomycin level down  · Monitor temperature and hemodynamics  · Monitor CBC with diff  · Monitor BMP     2   Acute Respiratory Failure   Now more hypercapnic related  S/p successful extubation, but now with increased O2 requirements again  Appears to be multifactorial including pulmonary vascular congestion and COVID infection  1/26 PCXR multifocal parenchymal opacities with progression  Rec:  · Monitor respiratory status  · Monitor on O2 requirement on Bipap     3   COVID-19 infection   S/p successful extubation, but now with increased O2 requirements again  CT more consistent with pulmonary edema than ground-glass opacities   Inflammatory markers are low as below   Patient received 1st COVID vaccine on 01/08/2021   Patient's + COVID PCR at [facility] was reported the week of 1/11/21   CRP 30 > 8  Ddimer 1 15 > 0 81  Ferritin 31 > 28  1/26 PCXR multifocal parenchymal opacities with progression  Rec:  · Continues COVID aligorhythm with IV Decadron taper D10  · Continues Lipitor, MVI, vitamin-D   · Patient received convalescent plasma 2021  · No further COVID specific therapy indicated with symptoms starting 2 weeks ago and low inflammatory markers      4  PATRICK on CKD  1/2 Creatinine 2 40  Patient had been on HD a year in  Vancomycin Random level 23 5  Rec:  · Monitor creatinine  · Recheck Vancomycin Random level with am labs  · Nephrology ongoing follow up     5  Morbid obesity   BMI 56 15 Increased risk factor for infection     8  XNN 5822 and s/p remote MSSA bacteremia s/p ceftaroline 6 week course through 3/10/19  Repeat blood cultures negative at 5 days  Rec:  · Check vancomycin random level  · Follow-up surveillance blood cultures once Vancomycin level down     7  Acute Depression  Possibly multifactorial in setting of PATRICK and on IV steroids  Rec:  · Care per primary care team  · Patient on 1 to 1 observation for safety  · Psychiatry following     Antibiotics:  Last Vancomycin dose 21     Above impression and plan discussed in detail with patient, RN, and primary care team      Subjective:  Patient is confused, talking about dolphins  He is not hungry for pureed diet at bedside table  He is HFNC statting 94% with talking  He states both knees bother him but not that much  ROS: Patient has no high fever, chills, sweats overnight; no nausea, vomiting, diarrhea; no increased cough  Objective:  Vitals:  Temp:  [97 8 °F (36 6 °C)-99 1 °F (37 3 °C)] 97 8 °F (36 6 °C)  HR:  [] 86  Resp:  [18-23] 23  BP: (123-133)/(60-82) 132/82  SpO2:  [89 %-95 %] 92 %  Temp (24hrs), Av 6 °F (37 °C), Min:97 8 °F (36 6 °C), Max:99 1 °F (37 3 °C)  Current: Temperature: 97 8 °F (36 6 °C)    General Appearance:  Awake, alert, cooperative to exam, confused, resting in bed on HFNC 40 L statting 94%, no acute distress with conversation  Throat: Oropharynx moist without lesions      Lungs:   Decreased breath sounds clearer to auscultation anteriolaterally bilaterally; on HFNC   Heart:  RRR; S1-S2 heard, no murmur   Abdomen:   Soft, non-tender,protuberant, positive bowel sounds  Extremities: +bilateral massive LE edema with mild discomfort of bilateral knees to palpation  No warmth or erythema, left arm midline site nontender   : Turner with less sediment in tubing   Skin: Less prominent blotchy eruption of chest and arms  + RUE biceps ecchymosis  Labs, Imaging, & Other studies:   All pertinent labs and imaging studies were personally reviewed  Results from last 7 days   Lab Units 01/27/21  0610 01/26/21  0025 01/25/21  0438   WBC Thousand/uL 4 37 6 13 9 95   HEMOGLOBIN g/dL 10 7* 11 0* 11 2*   PLATELETS Thousands/uL 180 165 148*     Results from last 7 days   Lab Units 01/27/21  0611  01/26/21  0025  01/24/21  1411  01/23/21  0450 01/22/21  0527   POTASSIUM mmol/L 3 6   < > 3 5   < >  --    < > 3 1* 3 4*   CHLORIDE mmol/L 112*   < > 108   < >  --    < > 102 104   CO2 mmol/L 30   < > 29   < >  --    < > 27 28   CO2, I-STAT mmol/L  --   --   --   --  33*   < >  --   --    BUN mg/dL 70*   < > 74*   < >  --    < > 71* 69*   CREATININE mg/dL 2 40*   < > 2 71*   < >  --    < > 2 56* 2 40*   EGFR ml/min/1 73sq m 28   < > 24   < >  --    < > 26 28   GLUCOSE, ISTAT mg/dl  --   --   --   --  131   < >  --   --    CALCIUM mg/dL 8 7   < > 8 7   < >  --    < > 7 7* 7 9*   AST U/L  --   --  21  --   --   --  19 11   ALT U/L  --   --  33  --   --   --  28 31   ALK PHOS U/L  --   --  62  --   --   --  62 73    < > = values in this interval not displayed       Results from last 7 days   Lab Units 01/21/21  0533   PROCALCITONIN ng/ml 0 11

## 2021-01-27 NOTE — ASSESSMENT & PLAN NOTE
Patient presented with acute hypoxic and hypercapnic respiratory failure requiring intubation and mechanical ventilation secondary to COVID-19 pneumonia  Patient extubated 1/18 and transferred out of ICU 1/20  Obtunded 1/24, stat ABG showed acute respiratory acidosis with hypercapnia  Patient likely also has a component of obesity hypoventilation syndrome/ANGELINA that is undiagnosed given his morbid obesity/body habitus  Presently, patient on high-flow nasal cannula oxygen, wean as able

## 2021-01-27 NOTE — ASSESSMENT & PLAN NOTE
Patient confirmed COVID-19 positive 01/16/2021  Initially presented to [address],[address] with respiratory distress and altered mental status  While at [facility], was intubated due to hypercapnia and hypoxia  He became hypotensive requiring pressor therapy and was transferred to [facility] for further critical care management of severe COVID-19 infection  · Patient received 1st COVID-19 vaccine 1/8  · Chest CT with minimal ground-glass opacities and left lower lobe consolidation concerning for superimposed bacterial process  · Received convalescent plasma 1/17  · Continue vitamin cocktail, changed to once daily Decadron  · Was on 6 L nasal cannula until he he was found to be obtunded 1/24 with an acute respiratory acidosis and hypercapnic placed on BIPAP  · On HFNC presently, wean as able

## 2021-01-27 NOTE — PLAN OF CARE
Problem: Prexisting or High Potential for Compromised Skin Integrity  Goal: Skin integrity is maintained or improved  Description: INTERVENTIONS:  - Identify patients at risk for skin breakdown  - Assess and monitor skin integrity  - Assess and monitor nutrition and hydration status  - Monitor labs   - Assess for incontinence   - Turn and reposition patient  - Assist with mobility/ambulation  - Relieve pressure over bony prominences  - Avoid friction and shearing  - Provide appropriate hygiene as needed including keeping skin clean and dry  - Evaluate need for skin moisturizer/barrier cream  - Collaborate with interdisciplinary team   - Patient/family teaching  - Consider wound care consult   Outcome: Progressing     Problem: Potential for Falls  Goal: Patient will remain free of falls  Description: INTERVENTIONS:  - Assess patient frequently for physical needs  -  Identify cognitive and physical deficits and behaviors that affect risk of falls  -  [place] fall precautions as indicated by assessment   - Educate patient/family on patient safety including physical limitations  - Instruct patient to call for assistance with activity based on assessment  - Modify environment to reduce risk of injury  - Consider OT/PT consult to assist with strengthening/mobility  Outcome: Progressing     Problem: Nutrition/Hydration-ADULT  Goal: Nutrient/Hydration intake appropriate for improving, restoring or maintaining nutritional needs  Description: Monitor and assess patient's nutrition/hydration status for malnutrition  Collaborate with interdisciplinary team and initiate plan and interventions as ordered  Monitor patient's weight and dietary intake as ordered or per policy  Utilize nutrition screening tool and intervene as necessary  Determine patient's food preferences and provide high-protein, high-caloric foods as appropriate       INTERVENTIONS:  - Monitor oral intake, urinary output, labs, and treatment plans  - Assess nutrition and hydration status and recommend course of action  - Evaluate amount of meals eaten  - Assist patient with eating if necessary   - Allow adequate time for meals  - Recommend/ encourage appropriate diets, oral nutritional supplements, and vitamin/mineral supplements  - Order, calculate, and assess calorie counts as needed  - Recommend, monitor, and adjust tube feedings and TPN/PPN based on assessed needs  - Assess need for intravenous fluids  - Provide specific nutrition/hydration education as appropriate  - Include patient/family/caregiver in decisions related to nutrition  Outcome: Progressing     Problem: NEUROSENSORY - ADULT  Goal: Achieves stable or improved neurological status  Description: INTERVENTIONS  - Monitor and report changes in neurological status  - Monitor vital signs such as temperature, blood pressure, glucose, and any other labs ordered   - Initiate measures to prevent increased intracranial pressure  - Monitor for seizure activity and implement precautions if appropriate      Outcome: Progressing  Goal: Achieves maximal functionality and self care  Description: INTERVENTIONS  - Monitor swallowing and airway patency with patient fatigue and changes in neurological status  - Encourage and assist patient to increase activity and self care     - Encourage visually impaired, hearing impaired and aphasic patients to use assistive/communication devices  Outcome: Progressing     Problem: CARDIOVASCULAR - ADULT  Goal: Maintains optimal cardiac output and hemodynamic stability  Description: INTERVENTIONS:  - Monitor I/O, vital signs and rhythm  - Monitor for S/S and trends of decreased cardiac output  - Administer and titrate ordered vasoactive medications to optimize hemodynamic stability  - Assess quality of pulses, skin color and temperature  - Assess for signs of decreased coronary artery perfusion  - Instruct patient to report change in severity of symptoms  Outcome: Progressing  Goal: Absence of cardiac dysrhythmias or at baseline rhythm  Description: INTERVENTIONS:  - Continuous cardiac monitoring, vital signs, obtain 12 lead EKG if ordered  - Administer antiarrhythmic and heart rate control medications as ordered  - Monitor electrolytes and administer replacement therapy as ordered  Outcome: Progressing     Problem: RESPIRATORY - ADULT  Goal: Achieves optimal ventilation and oxygenation  Description: INTERVENTIONS:  - Assess for changes in respiratory status  - Assess for changes in mentation and behavior  - Position to facilitate oxygenation and minimize respiratory effort  - Oxygen administered by appropriate delivery if ordered  - Initiate smoking cessation education as indicated  - Encourage broncho-pulmonary hygiene including cough, deep breathe, Incentive Spirometry  - Assess the need for suctioning and aspirate as needed  - Assess and instruct to report SOB or any respiratory difficulty  - Respiratory Therapy support as indicated  Outcome: Progressing     Problem: GENITOURINARY - ADULT  Goal: Maintains or returns to baseline urinary function  Description: INTERVENTIONS:  - Assess urinary function  - Encourage oral fluids to ensure adequate hydration if ordered  - Administer IV fluids as ordered to ensure adequate hydration  - Administer ordered medications as needed  - Offer frequent toileting  - Follow urinary retention protocol if ordered  Outcome: Progressing  Goal: Absence of urinary retention  Description: INTERVENTIONS:  - Assess patients ability to void and empty bladder  - Monitor I/O  - Bladder scan as needed  - Discuss with physician/AP medications to alleviate retention as needed  - Discuss catheterization for long term situations as appropriate  Outcome: Progressing  Goal: Urinary catheter remains patent  Description: INTERVENTIONS:  - Assess patency of urinary catheter  - If patient has a chronic bo, consider changing catheter if non-functioning  - Follow guidelines for intermittent irrigation of non-functioning urinary catheter  Outcome: Progressing     Problem: METABOLIC, FLUID AND ELECTROLYTES - ADULT  Goal: Electrolytes maintained within normal limits  Description: INTERVENTIONS:  - Monitor labs and assess patient for signs and symptoms of electrolyte imbalances  - Administer electrolyte replacement as ordered  - Monitor response to electrolyte replacements, including repeat lab results as appropriate  - Instruct patient on fluid and nutrition as appropriate  Outcome: Progressing  Goal: Fluid balance maintained  Description: INTERVENTIONS:  - Monitor labs   - Monitor I/O and WT  - Instruct patient on fluid and nutrition as appropriate  - Assess for signs & symptoms of volume excess or deficit  Outcome: Progressing  Goal: Glucose maintained within target range  Description: INTERVENTIONS:  - Monitor Blood Glucose as ordered  - Assess for signs and symptoms of hyperglycemia and hypoglycemia  - Administer ordered medications to maintain glucose within target range  - Assess nutritional intake and initiate nutrition service referral as needed  Outcome: Progressing     Problem: MUSCULOSKELETAL - ADULT  Goal: Maintain or return mobility to safest level of function  Description: INTERVENTIONS:  - Assess patient's ability to carry out ADLs; assess patient's baseline for ADL function and identify physical deficits which impact ability to perform ADLs (bathing, care of mouth/teeth, toileting, grooming, dressing, etc )  - Assess/evaluate cause of self-care deficits   - Assess range of motion  - Assess patient's mobility  - Assess patient's need for assistive devices and provide as appropriate  - Encourage maximum independence but intervene and supervise when necessary  - Involve family in performance of ADLs  - Assess for home care needs following discharge   - Consider OT consult to assist with ADL evaluation and planning for discharge  - Provide patient education as appropriate  Outcome: Progressing  Goal: Maintain proper alignment of affected body part  Description: INTERVENTIONS:  - Support, maintain and protect limb and body alignment  - Provide patient/ family with appropriate education  Outcome: Progressing     Problem: INFECTION - ADULT  Goal: Absence or prevention of progression during hospitalization  Description: INTERVENTIONS:  - Assess and monitor for signs and symptoms of infection  - Monitor lab/diagnostic results  - Monitor all insertion sites, i e  indwelling lines, tubes, and drains  - Monitor endotracheal if appropriate and nasal secretions for changes in amount and color  - [place] appropriate cooling/warming therapies per order  - Administer medications as ordered  - Instruct and encourage patient and family to use good hand hygiene technique  - Identify and instruct in appropriate isolation precautions for identified infection/condition  Outcome: Progressing     Problem: DISCHARGE PLANNING  Goal: Discharge to home or other facility with appropriate resources  Description: INTERVENTIONS:  - Identify barriers to discharge w/patient and caregiver  - Arrange for needed discharge resources and transportation as appropriate  - Identify discharge learning needs (meds, wound care, etc )  - Arrange for interpretive services to assist at discharge as needed  - Refer to Case Management Department for coordinating discharge planning if the patient needs post-hospital services based on physician/advanced practitioner order or complex needs related to functional status, cognitive ability, or social support system  Outcome: Progressing     Problem: Knowledge Deficit  Goal: Patient/family/caregiver demonstrates understanding of disease process, treatment plan, medications, and discharge instructions  Description: Complete learning assessment and assess knowledge base    Interventions:  - Provide teaching at level of understanding  - Provide teaching via preferred learning methods  Outcome: Progressing

## 2021-01-27 NOTE — SPEECH THERAPY NOTE
Speech Language/Pathology    Speech/Language Pathology Progress Note    Patient Name: Jeff ERIC'S Date: 1/27/2021       Subjective:  Pt seen for dysphagia tx at lunch  Pt did not eat  Breakfast, stated he wasn't hungry  Pt also stated he doesn't like the thickened liquids  Objective:  Pt seated upright, fed 100% of pureed chicken and mashed potatoes, doesn't like micha pudding  Pt drank 12 oz of NT water by straw- successive sips; good oral control, quick transfers and prompt swallows  Oral processing w/ puree w/ WNL  Swallows were complete  No coughing, throat clearing noted  Voice remained clear  Offered pt soft/solid for assessment trial, however pt refused  Assessment:  Pt tolerated puree w/ nectar thick liquids w/ good po intake and no overt s/s aspiration  Plan/Recommendations: Will cont to follow for potential to advance diet as able       Catalina Riggins CCC-SLP  Speech Pathogist  Available via  tiger text

## 2021-01-27 NOTE — PROGRESS NOTES
Progress Note - Danny Patel 1960, 63 y o  male MRN: [id number]    Unit/Bed#: S -01 Encounter: [id number]    Primary Care Provider: Trish Enamorado DO   Date and time admitted to hospital: 1/17/2021  4:16 PM        Acute respiratory failure with hypoxia and hypercarbia [facility])  Assessment & Plan  Patient presented with acute hypoxic and hypercapnic respiratory failure requiring intubation and mechanical ventilation secondary to COVID-19 pneumonia  Patient extubated 1/18 and transferred out of ICU 1/20  Obtunded 1/24, stat ABG showed acute respiratory acidosis with hypercapnia  Patient likely also has a component of obesity hypoventilation syndrome/ANGELINA that is undiagnosed given his morbid obesity/body habitus  Presently, patient on high-flow nasal cannula oxygen, wean as able  * Pneumonia due to COVID-19 virus  Assessment & Plan  Patient confirmed QABFV-90 positive 01/16/2021  Initially presented to [address] [place] [address],[address] with respiratory distress and altered mental status  While at [facility], was intubated due to hypercapnia and hypoxia  He became hypotensive requiring pressor therapy and was transferred to [facility] for further critical care management of severe COVID-19 infection  · Patient received 1st COVID-19 vaccine 1/8  · Chest CT with minimal ground-glass opacities and left lower lobe consolidation concerning for superimposed bacterial process  · Received convalescent plasma 1/17  · Continue vitamin cocktail, changed to once daily Decadron  · Was on 6 L nasal cannula until he he was found to be obtunded 1/24 with an acute respiratory acidosis and hypercapnic placed on BIPAP  · On HFNC presently, wean as able      Bacteremia  Assessment & Plan  Mixed Gram Positive Blood cultures   Admission blood cultures were drawn on the 17th at [facility] with 1 of 2 sets growing 2 colony types of coag-negative staph   Repeat blood cultures were drawn on arrival to [address] [place] [address] 1 of 2 blood cultures here are growing Enterococcus faecalis and coag-negative staph  Blood culture contaminants highly suspected with 3 different colony types of CNS and 1 of Enterococcus in same set as CNS and known difficult IV/blood draw access  · ID following, input appreciated  · Has hx of AVR 2014, TTE negative for vegetations  · 1/19/21 blood cultures negative x 5 days  · Monitoring off abx at present; still with supra therapeutic blood levels of vancomycin and vanco retention  · Will need repeat blood cultures next week once retained vanco out of system     Acute kidney injury superimposed on CKD [facility])  Assessment & Plan  POA, likely 2/2 ATN in setting of COVID-19 + RUFINA + vanco tox  Baseline creatinine 1 0-1 3  · Creat has plateaued at 2 5 for past several days  · Renal following  · Continue bo cath, monitor UOP  · Today, as per nephrologist, decreased Bumex to 2 mg twice a day  Patient will have free water for patient's hypernatremia and decrease IV fluids to 75 mL/hr (D5 water)  · Monitor BMP  · Avoid nephrotoxins  · Avoid hypotension  Acute metabolic encephalopathy  Assessment & Plan  Resolved  Secondary to hypoxia and hypercapnia with history of stroke and residual left-sided weakness, patient initially was intubated at [facility] for both hypoxia and hypercapnia and was extubated in ICU here at [facility]  He has redeveloped hypercapnia and was obtunded this afternoon, improved with BIPAP  Now on HFNC  · Avoid sedating medications  · Melatonin q h s  Hypernatremia  Assessment & Plan  · Nephrologist on board  · Continue D5 water  · Monitor  Anemia  Assessment & Plan  · Mild  · Monitor  · For further workup and management if this worsens significantly    Hematuria  Assessment & Plan  Appears resolved  Hemoglobin stable    Continue to monitor closely  · Prn bo flushes   · Lovenox prophylactic dose and monitor for tolerance    Atrial fibrillation [facility])  Assessment & Plan  Had an episode of AFib with RVR and associated hypotension with rapid response  Responded favorably to IV Lopressor  This is likely due to the fact that patient missed, his beta-blocker doses evening due to hold parameters not being met which have since been adjusted  · Heart rate now is back to normal   · Per last cardiology note 10/2020 Pt was not on AC due to life threatening GI bleed  Dr Yas Temple has spoken with Pt on multiple occasions about Watchman device pt has been declining  · Will continue lovenox prophylactic 40 mg daily, would ideally need higher dosing due to weight though cannot in the setting of his renal dysfunction, will increase Lovenox as renal function improves    Depression  Assessment & Plan  Possibly multifactorial in setting of PATRICK and on IV steroids  · Psychiatrist on board  · Previously on 1 is to 1 observation  · Continue Zoloft  Morbid obesity   Assessment & Plan  · Recommend weight loss      Dysphagia  Assessment & Plan  Noted to be coughing with meds on 1/25  · Previously NPO and today, speech therapist evaluated the patient  From her evaluation, patient may have pureed diet with nectar thick liquids  Hyperlipidemia  Assessment & Plan  · Continue statin    Essential hypertension  Assessment & Plan  BP stable, weaned off vasopressors  · Continue blood pressure medications  VTE Pharmacologic Prophylaxis:   Pharmacologic: Enoxaparin (Lovenox)  Mechanical VTE Prophylaxis in Place: Yes    Patient Centered Rounds: I have performed bedside rounds with nursing staff today  Discussions with Specialists or Other Care Team Provider:  Case management  Education and Discussions with Family / Patient:  I discussed our findings, management and plans to the patient  Answered questions and concerns  He is okay with the management and plans  I tried calling patient's spouse, Araceli Pritchard at [phone], 3 times, however, keeps having a busy tone    I also called the nursing home that was listed here as per spouse, [phone], [facility] nurse, however, phone just keeps on ringing and no voicemail to leave messages  Time Spent for Care: 30 minutes  More than 50% of total time spent on counseling and coordination of care as described above  Current Length of Stay: 10 day(s)    Current Patient Status: Inpatient   Certification Statement: The patient will continue to require additional inpatient hospital stay due to Above findings and plans  Discharge Plan:  None yet today  Code Status: Level 1 - Full Code      Subjective:   Patient told me that he feels better today  Patient denies significant shortness of breath  However, patient still on high-flow nasal cannula oxygen  Patient admits to occasional cough  Patient denies any nausea, vomiting or diarrhea  Patient denies any pains  Objective:     Vitals:   Temp (24hrs), Av 2 °F (36 8 °C), Min:97 8 °F (36 6 °C), Max:98 5 °F (36 9 °C)    Temp:  [97 8 °F (36 6 °C)-98 5 °F (36 9 °C)] 98 2 °F (36 8 °C)  HR:  [82-89] 82  Resp:  [18-23] 18  BP: (123-132)/(60-82) 126/67  SpO2:  [89 %-95 %] 92 %  Body mass index is 55 62 kg/m²  Input and Output Summary (last 24 hours): Intake/Output Summary (Last 24 hours) at 2021  Last data filed at 2021 1445  Gross per 24 hour   Intake 100 ml   Output 1915 ml   Net -1815 ml       Physical Exam:     Physical Exam  Vitals signs and nursing note reviewed  Constitutional:       General: He is not in acute distress  Appearance: He is ill-appearing  He is not toxic-appearing or diaphoretic  Cardiovascular:      Rate and Rhythm: Normal rate and regular rhythm  Heart sounds: Normal heart sounds  Pulmonary:      Effort: Pulmonary effort is normal  No respiratory distress  Breath sounds: No stridor  No wheezing, rhonchi or rales  Comments: Decreased breath sounds bilaterally  Abdominal:      General: Bowel sounds are normal  There is no distension  Palpations: Abdomen is soft  Tenderness: There is no abdominal tenderness  Musculoskeletal:      Right lower leg: Edema present  Left lower leg: Edema present  Skin:     General: Skin is warm  Coloration: Skin is not pale  Findings: No erythema or rash  Neurological:      General: No focal deficit present  Mental Status: He is alert  Psychiatric:         Mood and Affect: Mood normal              Additional Data:     Labs:    Results from last 7 days   Lab Units 01/27/21  0610   WBC Thousand/uL 4 37   HEMOGLOBIN g/dL 10 7*   HEMATOCRIT % 36 7   PLATELETS Thousands/uL 180   NEUTROS PCT % 86*   LYMPHS PCT % 4*   MONOS PCT % 9   EOS PCT % 0     Results from last 7 days   Lab Units 01/27/21  0611  01/26/21  0025  01/24/21  1411   POTASSIUM mmol/L 3 6   < > 3 5   < >  --    CHLORIDE mmol/L 112*   < > 108   < >  --    CO2 mmol/L 30   < > 29   < >  --    CO2, I-STAT mmol/L  --   --   --   --  33*   BUN mg/dL 70*   < > 74*   < >  --    CREATININE mg/dL 2 40*   < > 2 71*   < >  --    CALCIUM mg/dL 8 7   < > 8 7   < >  --    ALK PHOS U/L  --   --  62  --   --    ALT U/L  --   --  33  --   --    AST U/L  --   --  21  --   --    GLUCOSE, ISTAT mg/dl  --   --   --   --  131    < > = values in this interval not displayed  * I Have Reviewed All Lab Data Listed Above  * Additional Pertinent Lab Tests Reviewed: [facility] Admission Reviewed    Imaging:    Imaging Reports Reviewed Today Include:  Diagnostic imaging studies that were done on this admission  Imaging Personally Reviewed by Myself Includes:  None      Recent Cultures (last 7 days):           Last 24 Hours Medication List:   Current Facility-Administered Medications   Medication Dose Route Frequency Provider Last Rate    acetaminophen  325 mg Rectal Q6H PRN Cathy Connors PA-C      aspirin  81 mg Oral Daily Yareli Haynes PA-C      atorvastatin  40 mg Per NG Tube HS Yareli Haynes PA-C      bisacodyl  10 mg Rectal Daily PRN Yareli Haynes PA-C      bumetanide  2 mg Oral BID Kandi Salts, DO      chlorhexidine  15 mL Swish & Spit Q12H [address] Yareli Haynes PA-C      cholecalciferol  2,000 Units Per NG Tube Daily Yareli Haynes PA-C      dexamethasone  6 mg Intravenous Daily Lionel Robbins PA-C      dextrose  75 mL/hr Intravenous Continuous Kandi Salts, DO 75 mL/hr (01/27/21 1407)    diltiazem  90 mg Oral Q12H [address] Pablo Ferguson MD      docusate sodium  100 mg Oral BID KATHY Bloom      enoxaparin  40 mg Subcutaneous Q24H [address] Lionel Robbins PA-C      melatonin  3 mg Oral HS PRN KATHY Bloom      methocarbamol  500 mg Oral Q8H PRN KATHY Bloom      metoprolol  5 mg Intravenous Q6H PRN Pablo Ferguson MD      metoprolol tartrate  50 mg Oral TID Dionna Sousa PA-C      multivitamin with iron-minerals  15 mL Per NG Tube Daily Yareli Haynes PA-C      pantoprazole  40 mg Oral Early Morning Yareli Haynes PA-C      polyethylene glycol  17 g Oral BID KATHY Bloom      sertraline  125 mg Oral Daily Kevin Knox MD      sodium chloride  1,000 mL Intravenous Once Blas Spear PA-C      traMADol  50 mg Oral Once KATHY Bloom          Today, Patient Was Seen By: Elaine Veloz MD    ** Please Note: Dragon 360 Dictation voice to text software may have been used in the creation of this document   **

## 2021-01-28 LAB
ALBUMIN SERPL BCP-MCNC: 2.4 G/DL (ref 3.5–5)
ALP SERPL-CCNC: 55 U/L (ref 46–116)
ALT SERPL W P-5'-P-CCNC: 28 U/L (ref 12–78)
ANION GAP SERPL CALCULATED.3IONS-SCNC: 6 MMOL/L (ref 4–13)
AST SERPL W P-5'-P-CCNC: 17 U/L (ref 5–45)
BASOPHILS # BLD AUTO: 0 THOUSANDS/ΜL (ref 0–0.1)
BASOPHILS NFR BLD AUTO: 0 % (ref 0–1)
BILIRUB SERPL-MCNC: 0.56 MG/DL (ref 0.2–1)
BUN SERPL-MCNC: 66 MG/DL (ref 5–25)
CALCIUM ALBUM COR SERPL-MCNC: 9.9 MG/DL (ref 8.3–10.1)
CALCIUM SERPL-MCNC: 8.6 MG/DL (ref 8.3–10.1)
CHLORIDE SERPL-SCNC: 105 MMOL/L (ref 100–108)
CO2 SERPL-SCNC: 29 MMOL/L (ref 21–32)
CREAT SERPL-MCNC: 2.25 MG/DL (ref 0.6–1.3)
CRP SERPL QL: 115.3 MG/L
EOSINOPHIL # BLD AUTO: 0.01 THOUSAND/ΜL (ref 0–0.61)
EOSINOPHIL NFR BLD AUTO: 0 % (ref 0–6)
ERYTHROCYTE [DISTWIDTH] IN BLOOD BY AUTOMATED COUNT: 18.1 % (ref 11.6–15.1)
FERRITIN SERPL-MCNC: 395 NG/ML (ref 8–388)
GFR SERPL CREATININE-BSD FRML MDRD: 30 ML/MIN/1.73SQ M
GLUCOSE SERPL-MCNC: 104 MG/DL (ref 65–140)
GLUCOSE SERPL-MCNC: 126 MG/DL (ref 65–140)
GLUCOSE SERPL-MCNC: 129 MG/DL (ref 65–140)
GLUCOSE SERPL-MCNC: 99 MG/DL (ref 65–140)
GLUCOSE SERPL-MCNC: 99 MG/DL (ref 65–140)
HCT VFR BLD AUTO: 38.1 % (ref 36.5–49.3)
HGB BLD-MCNC: 11.2 G/DL (ref 12–17)
IMM GRANULOCYTES # BLD AUTO: 0.09 THOUSAND/UL (ref 0–0.2)
IMM GRANULOCYTES NFR BLD AUTO: 2 % (ref 0–2)
LYMPHOCYTES # BLD AUTO: 0.26 THOUSANDS/ΜL (ref 0.6–4.47)
LYMPHOCYTES NFR BLD AUTO: 6 % (ref 14–44)
MCH RBC QN AUTO: 22.9 PG (ref 26.8–34.3)
MCHC RBC AUTO-ENTMCNC: 29.4 G/DL (ref 31.4–37.4)
MCV RBC AUTO: 78 FL (ref 82–98)
MONOCYTES # BLD AUTO: 0.3 THOUSAND/ΜL (ref 0.17–1.22)
MONOCYTES NFR BLD AUTO: 7 % (ref 4–12)
NEUTROPHILS # BLD AUTO: 3.65 THOUSANDS/ΜL (ref 1.85–7.62)
NEUTS SEG NFR BLD AUTO: 85 % (ref 43–75)
NRBC BLD AUTO-RTO: 0 /100 WBCS
PLATELET # BLD AUTO: 184 THOUSANDS/UL (ref 149–390)
PMV BLD AUTO: 11.3 FL (ref 8.9–12.7)
POTASSIUM SERPL-SCNC: 3.8 MMOL/L (ref 3.5–5.3)
PROT SERPL-MCNC: 6.3 G/DL (ref 6.4–8.2)
RBC # BLD AUTO: 4.89 MILLION/UL (ref 3.88–5.62)
SODIUM SERPL-SCNC: 140 MMOL/L (ref 136–145)
VANCOMYCIN SERPL-MCNC: 14 UG/ML
WBC # BLD AUTO: 4.31 THOUSAND/UL (ref 4.31–10.16)

## 2021-01-28 PROCEDURE — 85025 COMPLETE CBC W/AUTO DIFF WBC: CPT | Performed by: INTERNAL MEDICINE

## 2021-01-28 PROCEDURE — 86140 C-REACTIVE PROTEIN: CPT | Performed by: INTERNAL MEDICINE

## 2021-01-28 PROCEDURE — 80202 ASSAY OF VANCOMYCIN: CPT | Performed by: INTERNAL MEDICINE

## 2021-01-28 PROCEDURE — 94760 N-INVAS EAR/PLS OXIMETRY 1: CPT

## 2021-01-28 PROCEDURE — 82948 REAGENT STRIP/BLOOD GLUCOSE: CPT

## 2021-01-28 PROCEDURE — 94762 N-INVAS EAR/PLS OXIMTRY CONT: CPT

## 2021-01-28 PROCEDURE — 99232 SBSQ HOSP IP/OBS MODERATE 35: CPT | Performed by: INTERNAL MEDICINE

## 2021-01-28 PROCEDURE — 82728 ASSAY OF FERRITIN: CPT | Performed by: INTERNAL MEDICINE

## 2021-01-28 PROCEDURE — 99232 SBSQ HOSP IP/OBS MODERATE 35: CPT | Performed by: PSYCHIATRY & NEUROLOGY

## 2021-01-28 PROCEDURE — 80053 COMPREHEN METABOLIC PANEL: CPT | Performed by: INTERNAL MEDICINE

## 2021-01-28 PROCEDURE — XW033H5 INTRODUCTION OF TOCILIZUMAB INTO PERIPHERAL VEIN, PERCUTANEOUS APPROACH, NEW TECHNOLOGY GROUP 5: ICD-10-PCS | Performed by: INTERNAL MEDICINE

## 2021-01-28 RX ORDER — ATORVASTATIN CALCIUM 40 MG/1
40 TABLET, FILM COATED ORAL
Status: DISCONTINUED | OUTPATIENT
Start: 2021-01-28 | End: 2021-02-01

## 2021-01-28 RX ORDER — AMOXICILLIN 250 MG
1 CAPSULE ORAL
Status: DISCONTINUED | OUTPATIENT
Start: 2021-01-28 | End: 2021-02-02

## 2021-01-28 RX ORDER — ACETAMINOPHEN 325 MG/1
650 TABLET ORAL EVERY 6 HOURS PRN
Status: DISCONTINUED | OUTPATIENT
Start: 2021-01-28 | End: 2021-02-02

## 2021-01-28 RX ORDER — ATORVASTATIN CALCIUM 40 MG/1
40 TABLET, FILM COATED ORAL
Status: DISCONTINUED | OUTPATIENT
Start: 2021-01-29 | End: 2021-01-28

## 2021-01-28 RX ADMIN — Medication 2000 UNITS: at 09:16

## 2021-01-28 RX ADMIN — PANTOPRAZOLE SODIUM 40 MG: 40 TABLET, DELAYED RELEASE ORAL at 05:16

## 2021-01-28 RX ADMIN — TOCILIZUMAB 800 MG: 20 INJECTION, SOLUTION, CONCENTRATE INTRAVENOUS at 18:16

## 2021-01-28 RX ADMIN — DEXTROSE 50 ML/HR: 5 SOLUTION INTRAVENOUS at 18:03

## 2021-01-28 RX ADMIN — ACETAMINOPHEN 650 MG: 325 TABLET, FILM COATED ORAL at 20:57

## 2021-01-28 RX ADMIN — ATORVASTATIN CALCIUM 40 MG: 40 TABLET, FILM COATED ORAL at 22:59

## 2021-01-28 RX ADMIN — CHLORHEXIDINE GLUCONATE 0.12% ORAL RINSE 15 ML: 1.2 LIQUID ORAL at 21:33

## 2021-01-28 RX ADMIN — METOPROLOL TARTRATE 50 MG: 50 TABLET, FILM COATED ORAL at 18:39

## 2021-01-28 RX ADMIN — METOPROLOL TARTRATE 50 MG: 50 TABLET, FILM COATED ORAL at 09:16

## 2021-01-28 RX ADMIN — DEXAMETHASONE SODIUM PHOSPHATE 6 MG: 4 INJECTION INTRA-ARTICULAR; INTRALESIONAL; INTRAMUSCULAR; INTRAVENOUS; SOFT TISSUE at 09:16

## 2021-01-28 RX ADMIN — DOCUSATE SODIUM 100 MG: 100 CAPSULE, LIQUID FILLED ORAL at 18:39

## 2021-01-28 RX ADMIN — ENOXAPARIN SODIUM 40 MG: 40 INJECTION SUBCUTANEOUS at 09:16

## 2021-01-28 RX ADMIN — CHLORHEXIDINE GLUCONATE 0.12% ORAL RINSE 15 ML: 1.2 LIQUID ORAL at 09:16

## 2021-01-28 RX ADMIN — DILTIAZEM HYDROCHLORIDE 90 MG: 90 CAPSULE, EXTENDED RELEASE ORAL at 09:17

## 2021-01-28 RX ADMIN — ASPIRIN 81 MG: 81 TABLET ORAL at 09:16

## 2021-01-28 RX ADMIN — DILTIAZEM HYDROCHLORIDE 90 MG: 90 CAPSULE, EXTENDED RELEASE ORAL at 21:27

## 2021-01-28 RX ADMIN — BUMETANIDE 2 MG: 1 TABLET ORAL at 21:13

## 2021-01-28 RX ADMIN — SERTRALINE 125 MG: 25 TABLET, FILM COATED ORAL at 09:16

## 2021-01-28 RX ADMIN — MULTIVITAMIN 15 ML: LIQUID ORAL at 09:18

## 2021-01-28 RX ADMIN — DEXTROSE 75 ML/HR: 5 SOLUTION INTRAVENOUS at 09:14

## 2021-01-28 NOTE — ASSESSMENT & PLAN NOTE
POA, likely multifactorial 2/2 ATN in setting of COVID-19 + contrast induced nephropathy + vanco toxicity  Baseline creatinine 1 0-1 3  · Creat has plateaued at 2 2 today  · Renal following  · Continue bo cath, monitor UOP  Patient is tolerating diuretics is currently -1 L in the last 24 hours  · Continue with Bumex to 2 mg twice a day  Hypernatremia improving  IV fluids adjusted by Nephrology  ·   Monitor BMP in am   · Avoid nephrotoxins  · Avoid hypotension

## 2021-01-28 NOTE — SPEECH THERAPY NOTE
Speech Language/Pathology    Speech/Language Pathology Progress Note    Patient Name: Hayden Cardenas  FGISV'S Date: 1/28/2021       Attempted to see pt for lunch, pt currently on NRB  Per nsg, pt does not like the NTL  Confirmed w/ [facility], pt was on mech soft w/ thin liquids prior to hospital admission  However pt does have hx of silent aspiration- VBS April 2019  Given pt's current COVID pneumonia and resp status would keep pt on NTL  However will plan to trial dysphagia 2 for lunch tomorrow, if appropriate      April Rose, [address] [place] CCC-SLP  Speech Pathogist  Available via  tiger text

## 2021-01-28 NOTE — ASSESSMENT & PLAN NOTE
Noted to be coughing with meds on 1/25  · Continue with pureed diet with nectar thick liquids per speech therapy recommendations

## 2021-01-28 NOTE — PLAN OF CARE
Problem: Prexisting or High Potential for Compromised Skin Integrity  Goal: Skin integrity is maintained or improved  Description: INTERVENTIONS:  - Identify patients at risk for skin breakdown  - Assess and monitor skin integrity  - Assess and monitor nutrition and hydration status  - Monitor labs   - Assess for incontinence   - Turn and reposition patient  - Assist with mobility/ambulation  - Relieve pressure over bony prominences  - Avoid friction and shearing  - Provide appropriate hygiene as needed including keeping skin clean and dry  - Evaluate need for skin moisturizer/barrier cream  - Collaborate with interdisciplinary team   - Patient/family teaching  - Consider wound care consult   Outcome: Progressing     Problem: Potential for Falls  Goal: Patient will remain free of falls  Description: INTERVENTIONS:  - Assess patient frequently for physical needs  -  Identify cognitive and physical deficits and behaviors that affect risk of falls  -  [place] fall precautions as indicated by assessment   - Educate patient/family on patient safety including physical limitations  - Instruct patient to call for assistance with activity based on assessment  - Modify environment to reduce risk of injury  - Consider OT/PT consult to assist with strengthening/mobility  Outcome: Progressing     Problem: Nutrition/Hydration-ADULT  Goal: Nutrient/Hydration intake appropriate for improving, restoring or maintaining nutritional needs  Description: Monitor and assess patient's nutrition/hydration status for malnutrition  Collaborate with interdisciplinary team and initiate plan and interventions as ordered  Monitor patient's weight and dietary intake as ordered or per policy  Utilize nutrition screening tool and intervene as necessary  Determine patient's food preferences and provide high-protein, high-caloric foods as appropriate       INTERVENTIONS:  - Monitor oral intake, urinary output, labs, and treatment plans  - Assess nutrition and hydration status and recommend course of action  - Evaluate amount of meals eaten  - Assist patient with eating if necessary   - Allow adequate time for meals  - Recommend/ encourage appropriate diets, oral nutritional supplements, and vitamin/mineral supplements  - Order, calculate, and assess calorie counts as needed  - Recommend, monitor, and adjust tube feedings and TPN/PPN based on assessed needs  - Assess need for intravenous fluids  - Provide specific nutrition/hydration education as appropriate  - Include patient/family/caregiver in decisions related to nutrition  Outcome: Progressing     Problem: NEUROSENSORY - ADULT  Goal: Achieves stable or improved neurological status  Description: INTERVENTIONS  - Monitor and report changes in neurological status  - Monitor vital signs such as temperature, blood pressure, glucose, and any other labs ordered   - Initiate measures to prevent increased intracranial pressure  - Monitor for seizure activity and implement precautions if appropriate      Outcome: Progressing  Goal: Achieves maximal functionality and self care  Description: INTERVENTIONS  - Monitor swallowing and airway patency with patient fatigue and changes in neurological status  - Encourage and assist patient to increase activity and self care     - Encourage visually impaired, hearing impaired and aphasic patients to use assistive/communication devices  Outcome: Progressing     Problem: CARDIOVASCULAR - ADULT  Goal: Maintains optimal cardiac output and hemodynamic stability  Description: INTERVENTIONS:  - Monitor I/O, vital signs and rhythm  - Monitor for S/S and trends of decreased cardiac output  - Administer and titrate ordered vasoactive medications to optimize hemodynamic stability  - Assess quality of pulses, skin color and temperature  - Assess for signs of decreased coronary artery perfusion  - Instruct patient to report change in severity of symptoms  Outcome: Progressing  Goal: Absence of cardiac dysrhythmias or at baseline rhythm  Description: INTERVENTIONS:  - Continuous cardiac monitoring, vital signs, obtain 12 lead EKG if ordered  - Administer antiarrhythmic and heart rate control medications as ordered  - Monitor electrolytes and administer replacement therapy as ordered  Outcome: Progressing     Problem: RESPIRATORY - ADULT  Goal: Achieves optimal ventilation and oxygenation  Description: INTERVENTIONS:  - Assess for changes in respiratory status  - Assess for changes in mentation and behavior  - Position to facilitate oxygenation and minimize respiratory effort  - Oxygen administered by appropriate delivery if ordered  - Initiate smoking cessation education as indicated  - Encourage broncho-pulmonary hygiene including cough, deep breathe, Incentive Spirometry  - Assess the need for suctioning and aspirate as needed  - Assess and instruct to report SOB or any respiratory difficulty  - Respiratory Therapy support as indicated  Outcome: Progressing     Problem: GENITOURINARY - ADULT  Goal: Maintains or returns to baseline urinary function  Description: INTERVENTIONS:  - Assess urinary function  - Encourage oral fluids to ensure adequate hydration if ordered  - Administer IV fluids as ordered to ensure adequate hydration  - Administer ordered medications as needed  - Offer frequent toileting  - Follow urinary retention protocol if ordered  Outcome: Progressing  Goal: Absence of urinary retention  Description: INTERVENTIONS:  - Assess patients ability to void and empty bladder  - Monitor I/O  - Bladder scan as needed  - Discuss with physician/AP medications to alleviate retention as needed  - Discuss catheterization for long term situations as appropriate  Outcome: Progressing  Goal: Urinary catheter remains patent  Description: INTERVENTIONS:  - Assess patency of urinary catheter  - If patient has a chronic bo, consider changing catheter if non-functioning  - Follow guidelines for intermittent irrigation of non-functioning urinary catheter  Outcome: Progressing     Problem: METABOLIC, FLUID AND ELECTROLYTES - ADULT  Goal: Electrolytes maintained within normal limits  Description: INTERVENTIONS:  - Monitor labs and assess patient for signs and symptoms of electrolyte imbalances  - Administer electrolyte replacement as ordered  - Monitor response to electrolyte replacements, including repeat lab results as appropriate  - Instruct patient on fluid and nutrition as appropriate  Outcome: Progressing  Goal: Fluid balance maintained  Description: INTERVENTIONS:  - Monitor labs   - Monitor I/O and WT  - Instruct patient on fluid and nutrition as appropriate  - Assess for signs & symptoms of volume excess or deficit  Outcome: Progressing  Goal: Glucose maintained within target range  Description: INTERVENTIONS:  - Monitor Blood Glucose as ordered  - Assess for signs and symptoms of hyperglycemia and hypoglycemia  - Administer ordered medications to maintain glucose within target range  - Assess nutritional intake and initiate nutrition service referral as needed  Outcome: Progressing

## 2021-01-28 NOTE — ASSESSMENT & PLAN NOTE
Had an episode of AFib with RVR and associated hypotension with rapid response  Responded favorably to IV Lopressor  This is likely due to the fact that patient missed, his beta-blocker doses evening due to hold parameters not being met which have since been adjusted  · Heart rate now is back to normal   · Per last cardiology note 10/2020 Pt was not on AC due to life threatening GI bleed  Dr Imra Gomez has spoken with Pt on multiple occasions about Watchman device pt has been declining    · Will continue lovenox prophylactic 40 mg daily, would ideally need higher dosing due to weight though cannot in the setting of his renal dysfunction, will increase Lovenox as renal function improves

## 2021-01-28 NOTE — PROGRESS NOTES
Progress Note - Alfredo Crawford 1960, 63 y o  male MRN: [id number]    Unit/Bed#: S -01 Encounter: [id number]    Primary Care Provider: Mercy Martinez DO   Date and time admitted to hospital: 1/17/2021  4:16 PM        * Pneumonia due to COVID-19 virus  Assessment & Plan  Patient confirmed COVID-19 positive 01/16/2021  Initially presented to [address] [place] [address],[address] with respiratory distress and altered mental status  While at [facility], was intubated due to hypercapnia and hypoxia  He became hypotensive requiring pressor therapy and [facility] of severe COVID-19 infection  On severe COVID pathway  · Patient received 1st COVID-19 vaccine 1/8  · Chest CT with minimal ground-glass opacities and left lower lobe consolidation concerning for superimposed bacterial process  · Received convalescent plasma 1/17  · Continue vitamin cocktail, changed to once daily Decadron  · Was on 6 L nasal cannula until he he was found to be obtunded 1/24 with an acute respiratory acidosis and hypercapnic placed on BIPAP  · On HFNC presently, wean as able  Acute respiratory failure with hypoxia and hypercarbia [facility])  Assessment & Plan  Patient presented with acute hypoxic and hypercapnic respiratory failure requiring intubation and mechanical ventilation secondary to COVID-19 pneumonia  Patient extubated 1/18 and transferred out of ICU 1/20  Obtunded 1/24, stat ABG showed acute respiratory acidosis with hypercapnia  Patient likely also has a component of obesity hypoventilation syndrome/ANGELINA that is undiagnosed given his morbid obesity/body habitus  Presently, patient on high-flow nasal cannula oxygen 90% 40L/min , wean as able  Acute kidney injury superimposed on CKD [facility])  Assessment & Plan  POA, likely multifactorial 2/2 ATN in setting of COVID-19 + contrast induced nephropathy + vanco toxicity   Baseline creatinine 1 0-1 3  · Creat has plateaued at 2 2 today  · Renal following  · Continue bo cath, monitor UOP  Patient is tolerating diuretics is currently -1 L in the last 24 hours  · Continue with Bumex to 2 mg twice a day  Hypernatremia improving  IV fluids adjusted by Nephrology  ·   Monitor BMP in am   · Avoid nephrotoxins  · Avoid hypotension  Hypernatremia  Assessment & Plan  · Nephrologist on board  · Continue D5 water at 50 mL/hour per renal recommendations     · Monitor BMP in a m       Anemia  Assessment & Plan  · Mild  · Monitor  · For further workup and management if this worsens significantly    Hematuria  Assessment & Plan  Appears resolved  Hemoglobin stable  Continue to monitor closely  · Prn bo flushes   · Lovenox prophylactic dose and monitor for tolerance    Bacteremia  Assessment & Plan  Mixed Gram Positive Blood cultures   Admission blood cultures were drawn on the 17th at [facility] with 1 of 2 sets growing 2 colony types of coag-negative staph   Repeat blood cultures were drawn on arrival to [address] [place] [address] 1 of 2 blood cultures here are growing Enterococcus faecalis and coag-negative staph  Blood culture contaminants highly suspected with 3 different colony types of CNS and 1 of Enterococcus in same set as CNS and known difficult IV/blood draw access  · ID following, input appreciated  · Has hx of AVR 2014, TTE negative for vegetations  · 1/19/21 blood cultures negative x 5 days  · Monitoring off abx at present; still with supra therapeutic blood levels of vancomycin and vanco retention  · Will need repeat blood cultures next week once retained vanco out of system     Atrial fibrillation [facility])  Assessment & Plan  Had an episode of AFib with RVR and associated hypotension with rapid response  Responded favorably to IV Lopressor  This is likely due to the fact that patient missed, his beta-blocker doses evening due to hold parameters not being met which have since been adjusted    · Heart rate now is back to normal   · Per last cardiology note 10/2020 Pt was not on AC due to life threatening GI bleed  Dr Cammie Vital has spoken with Pt on multiple occasions about Watchman device pt has been declining  · Will continue lovenox prophylactic 40 mg daily, would ideally need higher dosing due to weight though cannot in the setting of his renal dysfunction, will increase Lovenox as renal function improves    Depression  Assessment & Plan  Possibly multifactorial in setting of PATRICK and on IV steroids  · Psychiatrist on board  · Previously on 1: 1 observation  · Continue Zoloft  Morbid obesity   Assessment & Plan  · Recommend weight loss      Dysphagia  Assessment & Plan  Noted to be coughing with meds on 1/25  · Continue with pureed diet with nectar thick liquids per speech therapy recommendations  Acute metabolic encephalopathy  Assessment & Plan  Resolved  · Secondary to hypoxia and hypercapnia with history of stroke and residual left-sided weakness, patient initially was intubated at [facility] for both hypoxia and hypercapnia and was extubated in ICU here at [facility]  Continue to monitor follow mentation closely  Patient appears to have severe depression  Avoid sedating medications  · Melatonin q h s  Hyperlipidemia  Assessment & Plan  · Continue statin    Essential hypertension  Assessment & Plan  BP stable, weaned off vasopressors  · Continue blood pressure medications  VTE Pharmacologic Prophylaxis:   Pharmacologic: Enoxaparin (Lovenox)  Mechanical VTE Prophylaxis in Place: Yes    Patient Centered Rounds: I have performed bedside rounds with nursing staff today  Discussions with Specialists or Other Care Team Provider:  Discussed with patient's nurse  Education and Discussions with Family / Patient:  Discussed with patient at bedside  Wife updated over the phone    Time Spent for Care: 20 minutes  More than 50% of total time spent on counseling and coordination of care as described above      Current Length of Stay: 11 day(s)    Current Patient Status: Inpatient   Certification Statement: The patient will continue to require additional inpatient hospital stay due to Unstable respiratory status    Discharge Plan:  Currently not medically stable for discharge    Code Status: Level 1 - Full Code      Subjective:   Patient seen and examined  Frustrated about prolonged hospitalization  Continues to feel depressed  She remains on high-flow nasal cannula  Denies any abdominal discomfort nausea or vomiting  Objective:     Vitals:   Temp (24hrs), Av °F (36 7 °C), Min:97 4 °F (36 3 °C), Max:98 4 °F (36 9 °C)    Temp:  [97 4 °F (36 3 °C)-98 4 °F (36 9 °C)] 97 4 °F (36 3 °C)  HR:  [73-82] 78  Resp:  [18-20] 20  BP: (111-126)/(67-77) 123/77  SpO2:  [90 %-94 %] 94 %  Body mass index is 55 62 kg/m²  Input and Output Summary (last 24 hours): Intake/Output Summary (Last 24 hours) at 2021 1308  Last data filed at 2021 1132  Gross per 24 hour   Intake  5 ml   Output 2825 ml   Net -792 5 ml       Physical Exam:     Physical Exam  Constitutional:       Appearance: He is obese  HENT:      Head: Normocephalic and atraumatic  Nose: Nose normal       Mouth/Throat:      Mouth: Mucous membranes are moist    Eyes:      Extraocular Movements: Extraocular movements intact  Pupils: Pupils are equal, round, and reactive to light  Neck:      Musculoskeletal: Neck supple  Pulmonary:      Comments: Tachypneic with use of abdominal musculature  Diminished breath sounds at bases  Abdominal:      Comments: Oozing noted from the site of subcu Lovenox  injection   Nontender and bowel sounds audible   Genitourinary:     Comments: Indwelling Turner catheter in place  Musculoskeletal:      Right lower leg: Edema present  Left lower leg: Edema present  Comments: Left upper extremity midline in place   Skin:     General: Skin is warm  Neurological:      General: No focal deficit present        Mental Status: He is alert and oriented to person, place, and time  Mental status is at baseline  Psychiatric:      Comments: Depressed  Additional Data:     Labs:    Results from last 7 days   Lab Units 01/28/21  0512   WBC Thousand/uL 4 31   HEMOGLOBIN g/dL 11 2*   HEMATOCRIT % 38 1   PLATELETS Thousands/uL 184   NEUTROS PCT % 85*   LYMPHS PCT % 6*   MONOS PCT % 7   EOS PCT % 0     Results from last 7 days   Lab Units 01/28/21  0512   SODIUM mmol/L 140   POTASSIUM mmol/L 3 8   CHLORIDE mmol/L 105   CO2 mmol/L 29   BUN mg/dL 66*   CREATININE mg/dL 2 25*   ANION GAP mmol/L 6   CALCIUM mg/dL 8 6   ALBUMIN g/dL 2 4*   TOTAL BILIRUBIN mg/dL 0 56   ALK PHOS U/L 55   ALT U/L 28   AST U/L 17   GLUCOSE RANDOM mg/dL 126         Results from last 7 days   Lab Units 01/28/21  1214 01/28/21  0813 01/27/21  2109 01/27/21  1531 01/27/21  1035 01/27/21  0726 01/26/21  2118 01/26/21  1538 01/26/21  1135 01/26/21  0740 01/25/21  2341 01/25/21  1801   POC GLUCOSE mg/dl 104 129 144* 197* 127 102 128 144* 114 92 89 120                   * I Have Reviewed All Lab Data Listed Above  * Additional Pertinent Lab Tests Reviewed:  [facility] 65 Admission Reviewed    Imaging:    Imaging Reports Reviewed Today Include: NA  Recent Cultures (last 7 days):           Last 24 Hours Medication List:   Current Facility-Administered Medications   Medication Dose Route Frequency Provider Last Rate    acetaminophen  325 mg Rectal Q6H PRN Omid Alvarez PA-C      aspirin  81 mg Oral Daily Yareli Haynes PA-C      atorvastatin  40 mg Per NG Tube HS Yareli Haynes PA-C      bisacodyl  10 mg Rectal Daily PRN Yareli Haynes PA-C      bumetanide  2 mg Oral BID Tyler Duke DO      chlorhexidine  15 mL Swish & Spit Q12H [address] Yareli Haynes PA-C      cholecalciferol  2,000 Units Per NG Tube Daily Yareli Haynes PA-C      dexamethasone  6 mg Intravenous Daily Lionel Robbins PA-C      dextrose  50 mL/hr Intravenous Continuous Coke Hair, DO 50 mL/hr (01/28/21 1022)    diltiazem  90 mg Oral Q12H [facility] Samantha Estevez MD      docusate sodium  100 mg Oral BID Tingley Jamaica, KATHY      enoxaparin  40 mg Subcutaneous Q24H [facility] Lionel Robbins PA-C      melatonin  3 mg Oral HS PRN Tingley Jamaica, KATHY      methocarbamol  500 mg Oral Q8H PRN Tingley Jamaica, KATHY      metoprolol  5 mg Intravenous Q6H PRN Samantha Estevez MD      metoprolol tartrate  50 mg Oral TID Scott Ghosh PA-C      multivitamin with iron-minerals  15 mL Per NG Tube Daily Yareli Haynes PA-C      pantoprazole  40 mg Oral Early Morning Yareli Haynes PA-C      polyethylene glycol  17 g Oral BID Orlin Austin, KATHY      sertraline  125 mg Oral Daily Juan Rosas MD      sodium chloride  1,000 mL Intravenous Once Blas Spear PA-C      traMADol  50 mg Oral Once KATHY Antunez          Today, Patient Was Seen By: Raghavendra Helms MD    ** Please Note: Dictation voice to text software may have been used in the creation of this document   **

## 2021-01-28 NOTE — CASE MANAGEMENT
CM received call from [facility] requesting update on patient upon which CM provided as per chart review DC plan is for patient to return upon medical stability  Per admissions patient is a 15 day MA bedhold; however, if patient would be out longer than 15 days they anticipate still being able to accept  CM sent updated clinical via [address]  CM will continue to follow

## 2021-01-28 NOTE — ASSESSMENT & PLAN NOTE
· Nephrologist on board  · Continue D5 water at 50 mL/hour per renal recommendations     · Monitor BMP in jerri Gtz

## 2021-01-28 NOTE — ASSESSMENT & PLAN NOTE
Possibly multifactorial in setting of PATRICK and on IV steroids  · Psychiatrist on board  · Previously on 1: 1 observation  · Continue Zoloft

## 2021-01-28 NOTE — PROGRESS NOTES
Progress Note - Lucas 1 63 y o  male MRN: [id number]  Unit/Bed#: S -01 Encounter: [id number]      REQUIRED DOCUMENTATION:     1  This service was provided via Telemedicine  2  Provider located at [facility]  3  TeleMed provider: Chencho Santiago MD   4  Identify all parties in room with patient during tele consult:  Patient and his nurse  5  After connecting through televideo, patient was identified by name and date of birth and assistant checked wristband  Patient was then informed that this was a Telemedicine visit and that the exam was being conducted confidentially over secure lines  My office door was closed  No one else was in the room  Patient acknowledged consent and understanding of privacy and security of the Telemedicine visit, and gave us permission to have the assistant stay in the room in order to assist with the history and to conduct the exam   I informed the patient that I have reviewed their record in Epic and presented the opportunity for them to ask any questions regarding the visit today  The patient agreed to participate  Subjective  Patient is seen for continuing care  Patient and remains in restraints secondary to trying to pull his mask off  Behavior over the last 24 hours: improvong  Sleep: improving  Appetite: adequate, improving  Medication side effects: none verbalized  ROS: pain in hius back, dry mouth, fatigue, shortness of breath, Complete review of systems is negative except as noted above      Mental Status Exam:  Appearance:  alert, good eye contact, appears stated age, appropriate grooming and hygiene and overweight   Behavior:  calm, limited cooperativity and laying in bed   Motor: no abnormal movements and normal gait and balance   Speech:  spontaneous, clear, slow, normal volume, poverty of content and coherent   Mood:  depressed   Affect:  mood-congruent   Thought Process:  organized, circumstantial   Thought Content: no verbalized delusions or overt paranoia   Perceptual disturbances: no reported hallucinations and does not appear to be responding to internal stimuli at this time   Risk Potential: No active or passive suicidal or homicidal ideation was verbalized during interview, Low potential for aggression based on previous behavior   Cognition: oriented to self and situation, appears to be below average intelligence, cognitive deficit and cognition not formally tested   Insight:  Limited   Judgment: Limited     Risks, benefits and possible side effects of Medications:   Risks, benefits, and possible side effects of medications have been explained to the patient, who verbalizes understanding  Labs: I have personally reviewed all pertinent laboratory results         Assessment/Plan    Diagnosis:   MDD, severe, recurrent without psychotic features    Recommended Treatment:   Medical management as per primary team   Patient currently on sertraline 125 mg daily  No side effects to current medical management  Continue sertraline 125 mg daily   Patient may benefit from palliative care to address his chronic back symptoms and possible pain related to deconditioning      Vita Gonzalez MD    This note was not shared with the patient due to this is a psychotherapy note

## 2021-01-28 NOTE — PROGRESS NOTES
Progress Note - Infectious Disease   Alfredo Crawford 63 y o  male MRN: [id number]  Unit/Bed#: S -01 Encounter: [id number]      Impression/Plan:  1    Mixed Gram Positive Blood cultures   Admission blood cultures were drawn on the 17th at [facility] with 1 of 2 sets growing 2 colony types of coag-negative staph   Repeat blood cultures were drawn on arrival to [address] [place] [address] 1 of 2 blood cultures here are growing Enterococcus faecalis and coag-negative staph  Blood culture contaminants highly suspected with 3 different colony types of CNS and 1 of Enterococcus in same set as CNS and known difficult IV/blood draw access   Patient has AVR 2014  TTE negative for vegetation  1/19/21 repeat blood cultures remain negative at 5 days  1/21 Vancomycin Trough 50 6 and Vancomycin Random 48 8,  1/27 Vancomycin Random level 14    Rec:  · Monitoring off additional antibiotic  · Patient with Vancomycin level slowly trending down  · Will await checking surveillance blood cultures until 2/11/21; 2 weeks from today  · Monitor temperature and hemodynamics  · Monitor CBC with diff     2   Acute Respiratory Failure   Now more hypercapnic related  S/p successful extubation, but now with increased O2 requirements again  Appears to be multifactorial including pulmonary vascular congestion and COVID infection  1/26 PCXR multifocal parenchymal opacities with progression  Rec:  · Monitor respiratory status  · Monitor on O2 requirement on Bipap  · With increased O2 requirements and moderate increase in inflammatory markers, with Hep C/HIV screen negative, will order Actemera     3   COVID-19 infection   S/p successful extubation, but now with increased O2 requirements again  CT more consistent with pulmonary edema than ground-glass opacities   Inflammatory markers are moderately elevated as below  Per patient's wife, patient was tested at [facility] 1/6/21 for COVID-19  He then received 1st COVID vaccine on 01/08/2021   On 1/9/21 his COVID-19 PCR from 1/6 came back positive and he was asymptomatic at that point  CRP 30 > 8 > 227 7 > 115 3  Ddimer 1 15 > 0 81 > 1 19  Ferritin 31 > 28 > 579 > 395  1/26 PCXR multifocal parenchymal opacities with progression  Rec:  · Continues COVID aligorhythm with IV Decadron taper  · Continues Lipitor, MVI, vitamin-D   · Patient received convalescent plasma 01/17/2021  · Monitoring inflammatory markers and O2 requirements closely  · With increased O2 requirements and moderate increase in inflammatory markers, with Hep C/HIV screen negative, and lengthy discussion with wife via phone, she is agreeable to dose of Actemera  · Recheck CRP, Ferritin, CBC and CMP in am     4  PATRICK on CKD  1/2 Creatinine 2 25  Patient had been on HD a year in 2019 1/26 Vancomycin Random level 14  Rec:  · Monitor creatinine  · Nephrology ongoing follow up     5  Morbid obesity   BMI 56 15 Increased risk factor for infection     7  UML 4987 and s/p remote MSSA bacteremia s/p ceftaroline 6 week course through 3/10/19  Repeat blood cultures negative at 5 days  Rec:  · Check vancomycin random level  · Follow-up surveillance blood cultures once Vancomycin level down     7  Depression  Possibly multifactorial in setting of PATRICK and on IV steroids  Rec:  · Behavioral health following  · He is soft UE restraints to prevent him pulling off his face mask     Antibiotics:  Last Vancomycin dose 1/21/21     Above impression and plan discussed in detail with patient, patient's wife via phone at length, RN, and Dr Forrest Hadley  Per patient's wife, patient was tested at [facility] 1/6/21 for COVID-19  He then received 1st COVID vaccine on 01/08/2021  On 1/9/21 his COVID-19 PCR from 1/6 came back positive and he was asymptomatic at that point     I personally spent over half of a total 45 minutes in counseling and discussion with the patient and patient's wife via phone and coordination of care as described above       Subjective:  Patient is finally resting more comfortably on HFNC and face mask after repositioning in bed  He is not hungry for pureed diet and thickened water at bedside table  He is HFNC 40 L statting 90% with talking and at rest   He states both knees bother him and is breathing is about the same  ROS: Patient has no fever, chills, sweats overnight; no nausea, vomiting, diarrhea; no cough, he has daily documented BMs last 4 days  Objective:  Vitals:  Temp:  [97 4 °F (36 3 °C)-98 4 °F (36 9 °C)] 97 4 °F (36 3 °C)  HR:  [73-82] 78  Resp:  [18-20] 20  BP: (111-126)/(67-77) 123/77  SpO2:  [90 %-94 %] 90 %  Temp (24hrs), Av °F (36 7 °C), Min:97 4 °F (36 3 °C), Max:98 4 °F (36 9 °C)  Current: Temperature: (!) 97 4 °F (36 3 °C)    General Appearance:  Awake, alert, cooperative to exam, resting with HOB elevated in bed, in sleep, patient appears to be a mouth breather  Throat: Oropharynx moist without lesions  Lungs:   Decreased breath sounds throughout to auscultation bilaterally; + dry clearing cough; HFNC and face mask O2 with O2 stats 90%   Heart:  RRR; S1-S2 heard, no murmur   Abdomen:   Soft, non-tender, protuberant, positive bowel sounds  Extremities: + bilateral LE edema, bilateral sore knees to palpation  Left knee pink within marked broader unchanged, left arm midline IV site nontender   :  bo with fairly clear urine in bag   Skin: Less prominent lacy eruption of chest and arms    He is soft UE restraints to prevent him pulling off his face mask     Labs, Imaging, & Other studies:   All pertinent labs and imaging studies were personally reviewed  Results from last 7 days   Lab Units 21  0512 21  0610 21  0025   WBC Thousand/uL 4 31 4 37 6 13   HEMOGLOBIN g/dL 11 2* 10 7* 11 0*   PLATELETS Thousands/uL 184 180 165     Results from last 7 days   Lab Units 21  0512  21  0025  21  1411  21  0450   POTASSIUM mmol/L 3 8   < > 3 5   < >  --    < > 3 1*   CHLORIDE mmol/L 105   < > 108   < >  -- < > 102   CO2 mmol/L 29   < > 29   < >  --    < > 27   CO2, I-STAT mmol/L  --   --   --   --  33*   < >  --    BUN mg/dL 66*   < > 74*   < >  --    < > 71*   CREATININE mg/dL 2 25*   < > 2 71*   < >  --    < > 2 56*   EGFR ml/min/1 73sq m 30   < > 24   < >  --    < > 26   GLUCOSE, ISTAT mg/dl  --   --   --   --  131   < >  --    CALCIUM mg/dL 8 6   < > 8 7   < >  --    < > 7 7*   AST U/L 17  --  21  --   --   --  19   ALT U/L 28  --  33  --   --   --  28   ALK PHOS U/L 55  --  62  --   --   --  62    < > = values in this interval not displayed

## 2021-01-28 NOTE — ASSESSMENT & PLAN NOTE
Resolved  · Secondary to hypoxia and hypercapnia with history of stroke and residual left-sided weakness, patient initially was intubated at [facility] for both hypoxia and hypercapnia and was extubated in ICU here at [facility]  Continue to monitor follow mentation closely  Patient appears to have severe depression  Avoid sedating medications  · Melatonin q h s

## 2021-01-28 NOTE — PLAN OF CARE
Problem: NEUROSENSORY - ADULT  Goal: Achieves stable or improved neurological status  Description: INTERVENTIONS  - Monitor and report changes in neurological status  - Monitor vital signs such as temperature, blood pressure, glucose, and any other labs ordered   - Initiate measures to prevent increased intracranial pressure  - Monitor for seizure activity and implement precautions if appropriate      Outcome: Progressing  Goal: Achieves maximal functionality and self care  Description: INTERVENTIONS  - Monitor swallowing and airway patency with patient fatigue and changes in neurological status  - Encourage and assist patient to increase activity and self care     - Encourage visually impaired, hearing impaired and aphasic patients to use assistive/communication devices  Outcome: Progressing     Problem: CARDIOVASCULAR - ADULT  Goal: Maintains optimal cardiac output and hemodynamic stability  Description: INTERVENTIONS:  - Monitor I/O, vital signs and rhythm  - Monitor for S/S and trends of decreased cardiac output  - Administer and titrate ordered vasoactive medications to optimize hemodynamic stability  - Assess quality of pulses, skin color and temperature  - Assess for signs of decreased coronary artery perfusion  - Instruct patient to report change in severity of symptoms  Outcome: Progressing  Goal: Absence of cardiac dysrhythmias or at baseline rhythm  Description: INTERVENTIONS:  - Continuous cardiac monitoring, vital signs, obtain 12 lead EKG if ordered  - Administer antiarrhythmic and heart rate control medications as ordered  - Monitor electrolytes and administer replacement therapy as ordered  Outcome: Progressing     Problem: RESPIRATORY - ADULT  Goal: Achieves optimal ventilation and oxygenation  Description: INTERVENTIONS:  - Assess for changes in respiratory status  - Assess for changes in mentation and behavior  - Position to facilitate oxygenation and minimize respiratory effort  - Oxygen administered by appropriate delivery if ordered  - Initiate smoking cessation education as indicated  - Encourage broncho-pulmonary hygiene including cough, deep breathe, Incentive Spirometry  - Assess the need for suctioning and aspirate as needed  - Assess and instruct to report SOB or any respiratory difficulty  - Respiratory Therapy support as indicated  Outcome: Progressing

## 2021-01-28 NOTE — ASSESSMENT & PLAN NOTE
Mixed Gram Positive Blood cultures   Admission blood cultures were drawn on the 17th at [facility] with 1 of 2 sets growing 2 colony types of coag-negative staph   Repeat blood cultures were drawn on arrival to [address] [place] [address] 1 of 2 blood cultures here are growing Enterococcus faecalis and coag-negative staph  Blood culture contaminants highly suspected with 3 different colony types of CNS and 1 of Enterococcus in same set as CNS and known difficult IV/blood draw access  · ID following, input appreciated     · Has hx of AVR 2014, TTE negative for vegetations  · 1/19/21 blood cultures negative x 5 days  · Monitoring off abx at present; still with supra therapeutic blood levels of vancomycin and vanco retention  · Will need repeat blood cultures next week once retained vanco out of system

## 2021-01-28 NOTE — ASSESSMENT & PLAN NOTE
Patient presented with acute hypoxic and hypercapnic respiratory failure requiring intubation and mechanical ventilation secondary to COVID-19 pneumonia  Patient extubated 1/18 and transferred out of ICU 1/20  Obtunded 1/24, stat ABG showed acute respiratory acidosis with hypercapnia  Patient likely also has a component of obesity hypoventilation syndrome/ANGELINA that is undiagnosed given his morbid obesity/body habitus  Presently, patient on high-flow nasal cannula oxygen 90% 40L/min , wean as able

## 2021-01-28 NOTE — ASSESSMENT & PLAN NOTE
Patient confirmed COVID-19 positive 01/16/2021  Initially presented to [address] with respiratory distress and altered mental status  While at [facility], was intubated due to hypercapnia and hypoxia  He became hypotensive requiring pressor therapy and was transferred to [facility] for further critical care management of severe COVID-19 infection  On severe COVID pathway  · Patient received 1st COVID-19 vaccine 1/8  · Chest CT with minimal ground-glass opacities and left lower lobe consolidation concerning for superimposed bacterial process  · Received convalescent plasma 1/17  · Continue vitamin cocktail, changed to once daily Decadron  · Was on 6 L nasal cannula until he he was found to be obtunded 1/24 with an acute respiratory acidosis and hypercapnic placed on BIPAP  · On HFNC presently, wean as able

## 2021-01-28 NOTE — PROGRESS NOTES
Progress Note - Nephrology   Nolvia Nuñez 63 y o  male MRN: [id number]  Unit/Bed#: S -01 Encounter: [id number]    Assessment:  1  Acute kidney injury present on admission:  Likely secondary to ATN in multifactorial including COVID-19, contrast induced nephropathy plus or minus vancomycin related nephropathy  Creatinine is plateaued in the mid 2 range and has been increasing slowly as of today January 28th creatinine is decreased to 2 25  The patient is nonoliguric   UA showed large blood normal red blood cells 4-10 white blood cells and 2-3 coarse granular cast   No evidence of rhabdomyolysis with a CPK of 76  Turner catheter is in place  Patient is hemodynamically stable  Avoid hypotension and nephrotoxic medications  2  Stage 3 chronic kidney disease:  Baseline creatinine is 1 1-1 3 he follows with Nephrology as an outpatient  3  Hyponatremia: This has improved with the patient being on D5W his sodium this a m  Is 140  Decrease D5W to 50 cc an hour  4  Volume status:  Trying to keep his net negative to dries possible in the setting helping underlying hypoxemia  CO2 is 29  Continue with Bumex b i d  Plan:  Maintaining D5W as well as b i d  Bumex, decreasing D5W rate  Other electrolytes are stable  Kidney function is slowly improving  Subjective:   Patient is seen in follow-up for acute kidney injury and hypernatremia  Patient is on high-flow nasal cannula at 40 L  Kidney function has been improving slowly  Systolic blood pressure has been anywhere from 418-153 systolic  He is net -1 L over the past 24 hours  Objective:     Vitals: Blood pressure 123/77, pulse 78, temperature (!) 97 4 °F (36 3 °C), temperature source Axillary, resp  rate 20, height 5' 11" (1 803 m), weight (!) 181 kg (398 lb 13 oz), SpO2 90 %  ,Body mass index is 55 62 kg/m²      Weight (last 2 days)     None            Intake/Output Summary (Last 24 hours) at 1/28/2021 1007  Last data filed at 1/28/2021 5523  Gross per 24 hour   Intake 2032 5 ml   Output 2125 ml   Net -92 5 ml       Urethral Catheter Straight-tip 16 Fr  (Active)   Reasons to continue Urinary Catheter  Accurate I&O assessment in critically ill patients (48 hr  max) 01/27/21 0848   Goal for Removal Other (Comment) 01/27/21 0652   Site Assessment Clean;Skin intact 01/27/21 0652   Collection Container Standard drainage bag 01/27/21 0652   Securement Method Securing device (Describe) 01/27/21 0652   Irrigant Normal saline 01/26/21 1857   Urethral Irrigation Intake (mL) 20 mL 01/26/21 1857   Output (mL) 950 mL 01/27/21 2346       Physical Exam:  The physical exam was done by seeing and evaluating the patient to the glass window given COVID-19 in an effort to preserve PPE  General: patient is in NAD  Skin:  No new rash  Eyes:  No scleral icterus  Neck:  Supple no adenopathy  Chest:  No significant accessory muscle use noted  Abdomen:  Abdomen is obese  Extremities:   There is leg edema present  Neuro:  Appears to be nonfocal                Medications Prior to Admission   Medication    acetaminophen (TYLENOL) 325 mg tablet    allopurinol (ZYLOPRIM) 100 mg tablet    aspirin (ECOTRIN LOW STRENGTH) 81 mg EC tablet    atorvastatin (LIPITOR) 40 mg tablet    b complex-vitamin C-folic acid (NEPHROCAPS) 1 mg capsule    bisacodyl (DULCOLAX) 10 mg suppository    bumetanide (BUMEX) 1 mg tablet    Cholecalciferol (VITAMIN D3) 03847 units TABS    diltiazem (CARDIZEM CD) 180 mg 24 hr capsule    enoxaparin (LOVENOX) 40 mg/0 4 mL    magnesium oxide (MAG-OX) 400 mg    metoprolol tartrate (LOPRESSOR) 50 mg tablet    pantoprazole (PROTONIX) 40 mg tablet    POTASSIUM CHLORIDE PO    Probiotic Product (BACID PO)    sertraline (ZOLOFT) 100 mg tablet    sertraline (ZOLOFT) 25 mg tablet    traMADol (ULTRAM) 50 mg tablet       Current Facility-Administered Medications   Medication Dose Route Frequency    acetaminophen (TYLENOL) rectal suppository 325 mg  325 mg Rectal Q6H PRN    aspirin (ECOTRIN LOW STRENGTH) EC tablet 81 mg  81 mg Oral Daily    atorvastatin (LIPITOR) tablet 40 mg  40 mg Per NG Tube HS    bisacodyl (DULCOLAX) rectal suppository 10 mg  10 mg Rectal Daily PRN    bumetanide (BUMEX) tablet 2 mg  2 mg Oral BID    chlorhexidine (PERIDEX) 0 12 % oral rinse 15 mL  15 mL Swish & Spit Q12H [facility]    cholecalciferol (VITAMIN D3) tablet 2,000 Units  2,000 Units Per NG Tube Daily    dexamethasone (DECADRON) injection 6 mg  6 mg Intravenous Daily    dextrose 5 % infusion  75 mL/hr Intravenous Continuous    diltiazem (CARDIZEM SR) 12 hr capsule 90 mg  90 mg Oral Q12H [facility]    docusate sodium (COLACE) capsule 100 mg  100 mg Oral BID    enoxaparin (LOVENOX) subcutaneous injection 40 mg  40 mg Subcutaneous Q24H ARACELIS    melatonin tablet 3 mg  3 mg Oral HS PRN    methocarbamol (ROBAXIN) tablet 500 mg  500 mg Oral Q8H PRN    metoprolol (LOPRESSOR) injection 5 mg  5 mg Intravenous Q6H PRN    metoprolol tartrate (LOPRESSOR) tablet 50 mg  50 mg Oral TID    multivitamin with iron-minerals liquid 15 mL  15 mL Per NG Tube Daily    pantoprazole (PROTONIX) EC tablet 40 mg  40 mg Oral Early Morning    polyethylene glycol (MIRALAX) packet 17 g  17 g Oral BID    sertraline (ZOLOFT) tablet 125 mg  125 mg Oral Daily    sodium chloride 0 9 % bolus 1,000 mL  1,000 mL Intravenous Once    traMADol (ULTRAM) tablet 50 mg  50 mg Oral Once        Lab, Imaging and other studies: I have personally reviewed pertinent labs    CBC:   Lab Results   Component Value Date    WBC 4 31 01/28/2021    WBC 6 71 11/02/2014    RBC 4 89 01/28/2021    RBC 4 84 11/02/2014     CMP:   Lab Results   Component Value Date     04/09/2015     01/28/2021     04/09/2015    CO2 29 01/28/2021    CO2 33 (H) 01/24/2021    ANIONGAP 13 04/09/2015    BUN 66 (H) 01/28/2021    BUN 17 04/09/2015    CREATININE 2 25 (H) 01/28/2021    CREATININE 0 67 04/09/2015    GLUCOSE 131 01/24/2021    GLUCOSE 92 04/09/2015    CALCIUM 8 6 01/28/2021    CALCIUM 8 4 04/09/2015    AST 17 01/28/2021    AST 15 10/30/2014    ALT 28 01/28/2021    ALT 22 10/30/2014    ALKPHOS 55 01/28/2021    ALKPHOS 104 10/30/2014    PROT 7 7 10/30/2014    BILITOT 0 4 10/30/2014    EGFR 30 01/28/2021     Phosphorus:   Lab Results   Component Value Date    PHOS 4 3 03/05/2019     Magnesium:   Lab Results   Component Value Date    MG 2 8 (H) 01/27/2021    MG 2 1 09/05/2014     Urinalysis:   Lab Results   Component Value Date    COLORU Dark Neda 01/22/2021    COLORU Yellow 10/30/2014    CLARITYU Cloudy 01/22/2021    CLARITYU Clear 10/30/2014    SPECGRAV 1 020 01/22/2021    SPECGRAV 1 009 10/30/2014    PHUR 5 5 01/22/2021    PHUR 5 5 01/23/2019    PHUR 5 0 10/30/2014    LEUKOCYTESUR Trace (A) 01/22/2021    LEUKOCYTESUR Negative 10/30/2014    NITRITE Negative 01/22/2021    NITRITE Negative 10/30/2014    PROTEINUA Negative 10/30/2014    GLUCOSEU Negative 01/22/2021    GLUCOSEU Negative 10/30/2014    KETONESU Trace (A) 01/22/2021    KETONESU Negative 10/30/2014    BILIRUBINUR Negative 01/22/2021    BILIRUBINUR Negative 10/30/2014    BLOODU Large (A) 01/22/2021    BLOODU Negative 10/30/2014     BMP:   Lab Results   Component Value Date    GLUCOSE 131 01/24/2021    GLUCOSE 92 04/09/2015    SODIUM 140 01/28/2021    CO2 29 01/28/2021    CO2 33 (H) 01/24/2021    BUN 66 (H) 01/28/2021    BUN 17 04/09/2015    CREATININE 2 25 (H) 01/28/2021    CREATININE 0 67 04/09/2015    CALCIUM 8 6 01/28/2021    CALCIUM 8 4 04/09/2015     ABGs:   Lab Results   Component Value Date    PH 7 091 (LL) 01/24/2021

## 2021-01-29 ENCOUNTER — APPOINTMENT (INPATIENT)
Dept: RADIOLOGY | Facility: HOSPITAL | Age: 61
DRG: 130 | End: 2021-01-29
Payer: COMMERCIAL

## 2021-01-29 LAB
ALBUMIN SERPL BCP-MCNC: 1.9 G/DL (ref 3.5–5)
ALP SERPL-CCNC: 45 U/L (ref 46–116)
ALT SERPL W P-5'-P-CCNC: 21 U/L (ref 12–78)
ANION GAP SERPL CALCULATED.3IONS-SCNC: 6 MMOL/L (ref 4–13)
AST SERPL W P-5'-P-CCNC: 17 U/L (ref 5–45)
ATRIAL RATE: 159 BPM
BASOPHILS # BLD AUTO: 0 THOUSANDS/ΜL (ref 0–0.1)
BASOPHILS NFR BLD AUTO: 0 % (ref 0–1)
BILIRUB SERPL-MCNC: 0.44 MG/DL (ref 0.2–1)
BUN SERPL-MCNC: 53 MG/DL (ref 5–25)
CALCIUM ALBUM COR SERPL-MCNC: 8.7 MG/DL (ref 8.3–10.1)
CALCIUM SERPL-MCNC: 7 MG/DL (ref 8.3–10.1)
CHLORIDE SERPL-SCNC: 106 MMOL/L (ref 100–108)
CO2 SERPL-SCNC: 27 MMOL/L (ref 21–32)
CREAT SERPL-MCNC: 1.92 MG/DL (ref 0.6–1.3)
CRP SERPL QL: 53.8 MG/L
EOSINOPHIL # BLD AUTO: 0.09 THOUSAND/ΜL (ref 0–0.61)
EOSINOPHIL NFR BLD AUTO: 2 % (ref 0–6)
ERYTHROCYTE [DISTWIDTH] IN BLOOD BY AUTOMATED COUNT: 18.3 % (ref 11.6–15.1)
FERRITIN SERPL-MCNC: 191 NG/ML (ref 8–388)
GFR SERPL CREATININE-BSD FRML MDRD: 37 ML/MIN/1.73SQ M
GLUCOSE SERPL-MCNC: 119 MG/DL (ref 65–140)
GLUCOSE SERPL-MCNC: 124 MG/DL (ref 65–140)
GLUCOSE SERPL-MCNC: 141 MG/DL (ref 65–140)
GLUCOSE SERPL-MCNC: 184 MG/DL (ref 65–140)
HCT VFR BLD AUTO: 34 % (ref 36.5–49.3)
HGB BLD-MCNC: 9.9 G/DL (ref 12–17)
IMM GRANULOCYTES # BLD AUTO: 0.1 THOUSAND/UL (ref 0–0.2)
IMM GRANULOCYTES NFR BLD AUTO: 2 % (ref 0–2)
LYMPHOCYTES # BLD AUTO: 0.36 THOUSANDS/ΜL (ref 0.6–4.47)
LYMPHOCYTES NFR BLD AUTO: 8 % (ref 14–44)
MCH RBC QN AUTO: 23.6 PG (ref 26.8–34.3)
MCHC RBC AUTO-ENTMCNC: 29.1 G/DL (ref 31.4–37.4)
MCV RBC AUTO: 81 FL (ref 82–98)
MONOCYTES # BLD AUTO: 0.29 THOUSAND/ΜL (ref 0.17–1.22)
MONOCYTES NFR BLD AUTO: 6 % (ref 4–12)
NEUTROPHILS # BLD AUTO: 3.84 THOUSANDS/ΜL (ref 1.85–7.62)
NEUTS SEG NFR BLD AUTO: 82 % (ref 43–75)
NRBC BLD AUTO-RTO: 0 /100 WBCS
PLATELET # BLD AUTO: 173 THOUSANDS/UL (ref 149–390)
PMV BLD AUTO: 12 FL (ref 8.9–12.7)
POTASSIUM SERPL-SCNC: 3.3 MMOL/L (ref 3.5–5.3)
PROT SERPL-MCNC: 5.1 G/DL (ref 6.4–8.2)
QRS AXIS: 75 DEGREES
QRSD INTERVAL: 154 MS
QT INTERVAL: 282 MS
QTC INTERVAL: 475 MS
RBC # BLD AUTO: 4.2 MILLION/UL (ref 3.88–5.62)
SODIUM SERPL-SCNC: 139 MMOL/L (ref 136–145)
T WAVE AXIS: 268 DEGREES
VENTRICULAR RATE: 171 BPM
WBC # BLD AUTO: 4.68 THOUSAND/UL (ref 4.31–10.16)

## 2021-01-29 PROCEDURE — 94760 N-INVAS EAR/PLS OXIMETRY 1: CPT

## 2021-01-29 PROCEDURE — 94762 N-INVAS EAR/PLS OXIMTRY CONT: CPT

## 2021-01-29 PROCEDURE — 85025 COMPLETE CBC W/AUTO DIFF WBC: CPT | Performed by: INTERNAL MEDICINE

## 2021-01-29 PROCEDURE — 99232 SBSQ HOSP IP/OBS MODERATE 35: CPT | Performed by: INTERNAL MEDICINE

## 2021-01-29 PROCEDURE — 82728 ASSAY OF FERRITIN: CPT | Performed by: INTERNAL MEDICINE

## 2021-01-29 PROCEDURE — 71045 X-RAY EXAM CHEST 1 VIEW: CPT

## 2021-01-29 PROCEDURE — 86140 C-REACTIVE PROTEIN: CPT | Performed by: INTERNAL MEDICINE

## 2021-01-29 PROCEDURE — 93010 ELECTROCARDIOGRAM REPORT: CPT | Performed by: INTERNAL MEDICINE

## 2021-01-29 PROCEDURE — 82948 REAGENT STRIP/BLOOD GLUCOSE: CPT

## 2021-01-29 PROCEDURE — 80053 COMPREHEN METABOLIC PANEL: CPT | Performed by: INTERNAL MEDICINE

## 2021-01-29 PROCEDURE — 92526 ORAL FUNCTION THERAPY: CPT

## 2021-01-29 RX ORDER — POTASSIUM CHLORIDE 20 MEQ/1
40 TABLET, EXTENDED RELEASE ORAL ONCE
Status: COMPLETED | OUTPATIENT
Start: 2021-01-29 | End: 2021-01-29

## 2021-01-29 RX ADMIN — BUMETANIDE 2 MG: 1 TABLET ORAL at 10:07

## 2021-01-29 RX ADMIN — CHLORHEXIDINE GLUCONATE 0.12% ORAL RINSE 15 ML: 1.2 LIQUID ORAL at 10:03

## 2021-01-29 RX ADMIN — Medication 2000 UNITS: at 10:01

## 2021-01-29 RX ADMIN — DEXAMETHASONE SODIUM PHOSPHATE 6 MG: 4 INJECTION INTRA-ARTICULAR; INTRALESIONAL; INTRAMUSCULAR; INTRAVENOUS; SOFT TISSUE at 10:02

## 2021-01-29 RX ADMIN — METOPROLOL TARTRATE 50 MG: 50 TABLET, FILM COATED ORAL at 10:02

## 2021-01-29 RX ADMIN — CHLORHEXIDINE GLUCONATE 0.12% ORAL RINSE 15 ML: 1.2 LIQUID ORAL at 20:42

## 2021-01-29 RX ADMIN — DILTIAZEM HYDROCHLORIDE 90 MG: 90 CAPSULE, EXTENDED RELEASE ORAL at 10:04

## 2021-01-29 RX ADMIN — ASPIRIN 81 MG: 81 TABLET ORAL at 10:02

## 2021-01-29 RX ADMIN — SERTRALINE 125 MG: 25 TABLET, FILM COATED ORAL at 10:01

## 2021-01-29 RX ADMIN — DOCUSATE SODIUM AND SENNOSIDES 1 TABLET: 8.6; 5 TABLET ORAL at 20:43

## 2021-01-29 RX ADMIN — ATORVASTATIN CALCIUM 40 MG: 40 TABLET, FILM COATED ORAL at 20:42

## 2021-01-29 RX ADMIN — DOCUSATE SODIUM 100 MG: 100 CAPSULE, LIQUID FILLED ORAL at 17:26

## 2021-01-29 RX ADMIN — BUMETANIDE 2 MG: 1 TABLET ORAL at 17:25

## 2021-01-29 RX ADMIN — METOPROLOL TARTRATE 50 MG: 50 TABLET, FILM COATED ORAL at 20:43

## 2021-01-29 RX ADMIN — ENOXAPARIN SODIUM 40 MG: 40 INJECTION SUBCUTANEOUS at 10:02

## 2021-01-29 RX ADMIN — PANTOPRAZOLE SODIUM 40 MG: 40 TABLET, DELAYED RELEASE ORAL at 05:47

## 2021-01-29 RX ADMIN — Medication 3 MG: at 20:43

## 2021-01-29 RX ADMIN — METOPROLOL TARTRATE 50 MG: 50 TABLET, FILM COATED ORAL at 17:26

## 2021-01-29 RX ADMIN — DOCUSATE SODIUM 100 MG: 100 CAPSULE, LIQUID FILLED ORAL at 10:03

## 2021-01-29 RX ADMIN — METHOCARBAMOL TABLETS 500 MG: 500 TABLET, COATED ORAL at 20:42

## 2021-01-29 RX ADMIN — POLYETHYLENE GLYCOL 3350 17 G: 17 POWDER, FOR SOLUTION ORAL at 20:41

## 2021-01-29 RX ADMIN — POTASSIUM CHLORIDE 40 MEQ: 1500 TABLET, EXTENDED RELEASE ORAL at 10:48

## 2021-01-29 RX ADMIN — DILTIAZEM HYDROCHLORIDE 90 MG: 90 CAPSULE, EXTENDED RELEASE ORAL at 20:44

## 2021-01-29 NOTE — PLAN OF CARE
Pt to remain on puree diet with nectar thick liquids due to resp status limited time  Monitor O2 sats while eating

## 2021-01-29 NOTE — PLAN OF CARE
Problem: Prexisting or High Potential for Compromised Skin Integrity  Goal: Skin integrity is maintained or improved  Description: INTERVENTIONS:  - Identify patients at risk for skin breakdown  - Assess and monitor skin integrity  - Assess and monitor nutrition and hydration status  - Monitor labs   - Assess for incontinence   - Turn and reposition patient  - Assist with mobility/ambulation  - Relieve pressure over bony prominences  - Avoid friction and shearing  - Provide appropriate hygiene as needed including keeping skin clean and dry  - Evaluate need for skin moisturizer/barrier cream  - Collaborate with interdisciplinary team   - Patient/family teaching  - Consider wound care consult   Outcome: Progressing     Problem: Potential for Falls  Goal: Patient will remain free of falls  Description: INTERVENTIONS:  - Assess patient frequently for physical needs  -  Identify cognitive and physical deficits and behaviors that affect risk of falls  -  [place] fall precautions as indicated by assessment   - Educate patient/family on patient safety including physical limitations  - Instruct patient to call for assistance with activity based on assessment  - Modify environment to reduce risk of injury  - Consider OT/PT consult to assist with strengthening/mobility  Outcome: Progressing     Problem: Nutrition/Hydration-ADULT  Goal: Nutrient/Hydration intake appropriate for improving, restoring or maintaining nutritional needs  Description: Monitor and assess patient's nutrition/hydration status for malnutrition  Collaborate with interdisciplinary team and initiate plan and interventions as ordered  Monitor patient's weight and dietary intake as ordered or per policy  Utilize nutrition screening tool and intervene as necessary  Determine patient's food preferences and provide high-protein, high-caloric foods as appropriate       INTERVENTIONS:  - Monitor oral intake, urinary output, labs, and treatment plans  - Assess nutrition and hydration status and recommend course of action  - Evaluate amount of meals eaten  - Assist patient with eating if necessary   - Allow adequate time for meals  - Recommend/ encourage appropriate diets, oral nutritional supplements, and vitamin/mineral supplements  - Order, calculate, and assess calorie counts as needed  - Recommend, monitor, and adjust tube feedings and TPN/PPN based on assessed needs  - Assess need for intravenous fluids  - Provide specific nutrition/hydration education as appropriate  - Include patient/family/caregiver in decisions related to nutrition  Outcome: Progressing     Problem: NEUROSENSORY - ADULT  Goal: Achieves stable or improved neurological status  Description: INTERVENTIONS  - Monitor and report changes in neurological status  - Monitor vital signs such as temperature, blood pressure, glucose, and any other labs ordered   - Initiate measures to prevent increased intracranial pressure  - Monitor for seizure activity and implement precautions if appropriate      Outcome: Progressing  Goal: Achieves maximal functionality and self care  Description: INTERVENTIONS  - Monitor swallowing and airway patency with patient fatigue and changes in neurological status  - Encourage and assist patient to increase activity and self care     - Encourage visually impaired, hearing impaired and aphasic patients to use assistive/communication devices  Outcome: Progressing     Problem: CARDIOVASCULAR - ADULT  Goal: Maintains optimal cardiac output and hemodynamic stability  Description: INTERVENTIONS:  - Monitor I/O, vital signs and rhythm  - Monitor for S/S and trends of decreased cardiac output  - Administer and titrate ordered vasoactive medications to optimize hemodynamic stability  - Assess quality of pulses, skin color and temperature  - Assess for signs of decreased coronary artery perfusion  - Instruct patient to report change in severity of symptoms  Outcome: Progressing  Goal: Absence of cardiac dysrhythmias or at baseline rhythm  Description: INTERVENTIONS:  - Continuous cardiac monitoring, vital signs, obtain 12 lead EKG if ordered  - Administer antiarrhythmic and heart rate control medications as ordered  - Monitor electrolytes and administer replacement therapy as ordered  Outcome: Progressing     Problem: RESPIRATORY - ADULT  Goal: Achieves optimal ventilation and oxygenation  Description: INTERVENTIONS:  - Assess for changes in respiratory status  - Assess for changes in mentation and behavior  - Position to facilitate oxygenation and minimize respiratory effort  - Oxygen administered by appropriate delivery if ordered  - Initiate smoking cessation education as indicated  - Encourage broncho-pulmonary hygiene including cough, deep breathe, Incentive Spirometry  - Assess the need for suctioning and aspirate as needed  - Assess and instruct to report SOB or any respiratory difficulty  - Respiratory Therapy support as indicated  Outcome: Progressing     Problem: GENITOURINARY - ADULT  Goal: Maintains or returns to baseline urinary function  Description: INTERVENTIONS:  - Assess urinary function  - Encourage oral fluids to ensure adequate hydration if ordered  - Administer IV fluids as ordered to ensure adequate hydration  - Administer ordered medications as needed  - Offer frequent toileting  - Follow urinary retention protocol if ordered  Outcome: Progressing  Goal: Absence of urinary retention  Description: INTERVENTIONS:  - Assess patients ability to void and empty bladder  - Monitor I/O  - Bladder scan as needed  - Discuss with physician/AP medications to alleviate retention as needed  - Discuss catheterization for long term situations as appropriate  Outcome: Progressing  Goal: Urinary catheter remains patent  Description: INTERVENTIONS:  - Assess patency of urinary catheter  - If patient has a chronic bo, consider changing catheter if non-functioning  - Follow guidelines for intermittent irrigation of non-functioning urinary catheter  Outcome: Progressing     Problem: METABOLIC, FLUID AND ELECTROLYTES - ADULT  Goal: Electrolytes maintained within normal limits  Description: INTERVENTIONS:  - Monitor labs and assess patient for signs and symptoms of electrolyte imbalances  - Administer electrolyte replacement as ordered  - Monitor response to electrolyte replacements, including repeat lab results as appropriate  - Instruct patient on fluid and nutrition as appropriate  Outcome: Progressing  Goal: Fluid balance maintained  Description: INTERVENTIONS:  - Monitor labs   - Monitor I/O and WT  - Instruct patient on fluid and nutrition as appropriate  - Assess for signs & symptoms of volume excess or deficit  Outcome: Progressing  Goal: Glucose maintained within target range  Description: INTERVENTIONS:  - Monitor Blood Glucose as ordered  - Assess for signs and symptoms of hyperglycemia and hypoglycemia  - Administer ordered medications to maintain glucose within target range  - Assess nutritional intake and initiate nutrition service referral as needed  Outcome: Progressing     Problem: MUSCULOSKELETAL - ADULT  Goal: Maintain or return mobility to safest level of function  Description: INTERVENTIONS:  - Assess patient's ability to carry out ADLs; assess patient's baseline for ADL function and identify physical deficits which impact ability to perform ADLs (bathing, care of mouth/teeth, toileting, grooming, dressing, etc )  - Assess/evaluate cause of self-care deficits   - Assess range of motion  - Assess patient's mobility  - Assess patient's need for assistive devices and provide as appropriate  - Encourage maximum independence but intervene and supervise when necessary  - Involve family in performance of ADLs  - Assess for home care needs following discharge   - Consider OT consult to assist with ADL evaluation and planning for discharge  - Provide patient education as appropriate  Outcome: Progressing  Goal: Maintain proper alignment of affected body part  Description: INTERVENTIONS:  - Support, maintain and protect limb and body alignment  - Provide patient/ family with appropriate education  Outcome: Progressing     Problem: INFECTION - ADULT  Goal: Absence or prevention of progression during hospitalization  Description: INTERVENTIONS:  - Assess and monitor for signs and symptoms of infection  - Monitor lab/diagnostic results  - Monitor all insertion sites, i e  indwelling lines, tubes, and drains  - Monitor endotracheal if appropriate and nasal secretions for changes in amount and color  - [place] appropriate cooling/warming therapies per order  - Administer medications as ordered  - Instruct and encourage patient and family to use good hand hygiene technique  - Identify and instruct in appropriate isolation precautions for identified infection/condition  Outcome: Progressing     Problem: DISCHARGE PLANNING  Goal: Discharge to home or other facility with appropriate resources  Description: INTERVENTIONS:  - Identify barriers to discharge w/patient and caregiver  - Arrange for needed discharge resources and transportation as appropriate  - Identify discharge learning needs (meds, wound care, etc )  - Arrange for interpretive services to assist at discharge as needed  - Refer to Case Management Department for coordinating discharge planning if the patient needs post-hospital services based on physician/advanced practitioner order or complex needs related to functional status, cognitive ability, or social support system  Outcome: Progressing     Problem: Knowledge Deficit  Goal: Patient/family/caregiver demonstrates understanding of disease process, treatment plan, medications, and discharge instructions  Description: Complete learning assessment and assess knowledge base    Interventions:  - Provide teaching at level of understanding  - Provide teaching via preferred learning methods  Outcome: Progressing

## 2021-01-29 NOTE — PROGRESS NOTES
Progress Note - Rosa Collins 1960, 63 y o  male MRN: [id number]    Unit/Bed#: S -01 Encounter: [id number]    Primary Care Provider: Vianney Sauer DO   Date and time admitted to hospital: 1/17/2021  4:16 PM        * Pneumonia due to COVID-19 virus  Assessment & Plan  Patient confirmed COVID-19 positive 01/16/2021  Initially presented to [facility] with respiratory distress and altered mental status  While at [facility], was intubated due to hypercapnia and hypoxia  He became hypotensive requiring pressor therapy and was transferred to [facility] for further critical care management of severe COVID-19 infection  On severe COVID pathway  · Patient received 1st COVID-19 vaccine 1/8  · Chest CT with minimal ground-glass opacities and left lower lobe consolidation concerning for superimposed bacterial process  · Received convalescent plasma 1/17  · Continue vitamin cocktail, changed to once daily Decadron  · Was on 6 L nasal cannula until he he was found to be obtunded 1/24 with an acute respiratory acidosis and hypercapnic placed on BIPAP  · On HFNC presently, wean as able  · Will repeat CXR    Acute kidney injury superimposed on CKD [facility])  Assessment & Plan  POA, likely multifactorial 2/2 ATN in setting of COVID-19 + contrast induced nephropathy + vanco toxicity  Baseline creatinine 1 0-1 3  · Creat has plateaued at 2 2 today  · Renal following  · Continue bo cath, monitor UOP  Patient is tolerating diuretics is currently -1 L in the last 24 hours  · Continue with Bumex to 2 mg twice a day  Hypernatremia improving  IV fluids adjusted by Nephrology  ·   Monitor BMP in am   · Avoid nephrotoxins  · Avoid hypotension  Hematuria  Assessment & Plan  Appears resolved  Hemoglobin stable  Continue to monitor closely  · Prn bo flushes   · Lovenox prophylactic dose and monitor for tolerance      Bacteremia  Assessment & Plan  Mixed Gram Positive Blood cultures   Admission blood cultures were drawn on the 17th at [facility] with 1 of 2 sets growing 2 colony types of coag-negative staph   Repeat blood cultures were drawn on arrival to [address] [place] [address] 1 of 2 blood cultures here are growing Enterococcus faecalis and coag-negative staph  Blood culture contaminants highly suspected with 3 different colony types of CNS and 1 of Enterococcus in same set as CNS and known difficult IV/blood draw access  · ID following, input appreciated  · Has hx of AVR 2014, TTE negative for vegetations  · 1/19/21 blood cultures negative   · Monitoring off abx at present; still with supra therapeutic blood levels of vancomycin and vanco retention  · Will need repeat blood cultures next week once retained vanco out of system     Acute respiratory failure with hypoxia and hypercarbia [facility])  Assessment & Plan  Patient presented with acute hypoxic and hypercapnic respiratory failure requiring intubation and mechanical ventilation secondary to COVID-19 pneumonia  Patient extubated 1/18 and transferred out of ICU 1/20  Obtunded 1/24, stat ABG showed acute respiratory acidosis with hypercapnia  Patient likely also has a component of obesity hypoventilation syndrome/ANGELINA that is undiagnosed given his morbid obesity/body habitus  Presently, patient on high-flow nasal cannula oxygen 90% 40L/min , wean as able  Atrial fibrillation [facility])  Assessment & Plan  Had an episode of AFib with RVR and associated hypotension with rapid response  Responded favorably to IV Lopressor  This is likely due to the fact that patient missed, his beta-blocker doses evening due to hold parameters not being met which have since been adjusted  · Heart rate now is back to normal   · Per last cardiology note 10/2020 Pt was not on AC due to life threatening GI bleed  Dr Sharron Montana has spoken with Pt on multiple occasions about Watchman device pt has been declining    · Will continue lovenox prophylactic 40 mg daily, would ideally need higher dosing due to weight though cannot in the setting of his renal dysfunction, will increase Lovenox as renal function improves    Depression  Assessment & Plan  Possibly multifactorial in setting of PATRICK and on IV steroids  · Psychiatrist on board  · Previously on 1: 1 observation  · Continue Zoloft  Morbid obesity   Assessment & Plan  · Recommend weight loss      Dysphagia  Assessment & Plan  Noted to be coughing with meds on   · Continue with pureed diet with nectar thick liquids per speech therapy recommendations  Acute metabolic encephalopathy  Assessment & Plan  Resolved  · Secondary to hypoxia and hypercapnia with history of stroke and residual left-sided weakness, patient initially was intubated at [facility] for both hypoxia and hypercapnia and was extubated in ICU here at [facility]  Continue to monitor follow mentation closely  Patient appears to have severe depression  Avoid sedating medications  · Melatonin q h s  VTE Pharmacologic Prophylaxis:   Pharmacologic: Enoxaparin (Lovenox)  Mechanical VTE Prophylaxis in Place: Yes    Patient Centered Rounds:     Discussions with Specialists or Other Care Team Provider: I spoke with pt's wife  She would like to continue current treatment    Education and Discussions with Family / Patient:     Time Spent for Care: 20 minutes  More than 50% of total time spent on counseling and coordination of care as described above      Current Length of Stay: 12 day(s)    Current Patient Status: Inpatient   Certification Statement: The patient will continue to require additional inpatient hospital stay due to above/    Discharge Plan: Not ready    Code Status: Level 1 - Full Code      Subjective:   Pt reports frustration about being in the hospital  " I just want to get out of here"     Objective:     Vitals:   Temp (24hrs), Av 1 °F (36 7 °C), Min:98 °F (36 7 °C), Max:98 1 °F (36 7 °C)    Temp:  [98 °F (36 7 °C)-98 1 °F (36 7 °C)] 98 1 °F (36 7 °C)  HR:  [69-70] 70  Resp:  [18-20] 18  BP: (109-148)/(56-76) 109/62  SpO2:  [87 %-92 %] 91 %  Body mass index is 56 64 kg/m²  Input and Output Summary (last 24 hours): Intake/Output Summary (Last 24 hours) at 1/29/2021 1629  Last data filed at 1/29/2021 1353  Gross per 24 hour   Intake 930 ml   Output 1600 ml   Net -670 ml       Physical Exam:     Physical Exam  Constitutional:       General: He is not in acute distress  Appearance: He is not ill-appearing, toxic-appearing or diaphoretic  Eyes:      General:         Right eye: No discharge  Left eye: No discharge  Cardiovascular:      Rate and Rhythm: Normal rate and regular rhythm  Pulses: Normal pulses  Pulmonary:      Effort: Pulmonary effort is normal  No respiratory distress  Breath sounds: No wheezing  Abdominal:      General: Bowel sounds are normal  There is no distension  Palpations: Abdomen is soft  There is no mass  Skin:     General: Skin is warm  Neurological:      General: No focal deficit present  Psychiatric:         Mood and Affect: Mood is depressed  Behavior: Behavior normal          Additional Data:     Labs:    Results from last 7 days   Lab Units 01/29/21  0552   WBC Thousand/uL 4 68   HEMOGLOBIN g/dL 9 9*   HEMATOCRIT % 34 0*   PLATELETS Thousands/uL 173   NEUTROS PCT % 82*   LYMPHS PCT % 8*   MONOS PCT % 6   EOS PCT % 2     Results from last 7 days   Lab Units 01/29/21  0552  01/24/21  1411   POTASSIUM mmol/L 3 3*   < >  --    CHLORIDE mmol/L 106   < >  --    CO2 mmol/L 27   < >  --    CO2, I-STAT mmol/L  --   --  33*   BUN mg/dL 53*   < >  --    CREATININE mg/dL 1 92*   < >  --    CALCIUM mg/dL 7 0*   < >  --    ALK PHOS U/L 45*   < >  --    ALT U/L 21   < >  --    AST U/L 17   < >  --    GLUCOSE, ISTAT mg/dl  --   --  131    < > = values in this interval not displayed               Recent Cultures (last 7 days):           Last 24 Hours Medication List:   Current Facility-Administered Medications Medication Dose Route Frequency Provider Last Rate    acetaminophen  650 mg Oral Q6H PRN Yareli Haynes PA-C      aspirin  81 mg Oral Daily Yareli Haynes PA-C      atorvastatin  40 mg Oral HS KATHY Toth      bisacodyl  10 mg Rectal Daily PRN Yareli Haynes PA-C      bumetanide  2 mg Oral BID Alexandra Richter DO      chlorhexidine  15 mL Swish & Spit Q12H [facility] Yareli Haynes PA-C      cholecalciferol  2,000 Units Per NG Tube Daily Yareli Haynes PA-C      dexamethasone  6 mg Intravenous Daily Lionel Robbins PA-C      diltiazem  90 mg Oral Q12H [facility] Neptali Amador MD      docusate sodium  100 mg Oral BID Clearence Bake, CRNP      enoxaparin  40 mg Subcutaneous Q24H [facility] Lionel Robbins PA-C      melatonin  3 mg Oral HS PRN Clearence Bake, KATHY      methocarbamol  500 mg Oral Q8H PRN Clearence Bake, CRNP      metoprolol  5 mg Intravenous Q6H PRN Neptali Amador MD      metoprolol tartrate  50 mg Oral TID Cruzito Madrid PA-C      multivitamin with iron-minerals  15 mL Per NG Tube Daily Yareli Haynes PA-C      pantoprazole  40 mg Oral Early Morning Yareli Haynes PA-C      polyethylene glycol  17 g Oral BID Clearence Bake, KATHY      senna-docusate sodium  1 tablet Oral HS Mariajose Evans PA-C      sertraline  125 mg Oral Daily Vita Gonzalez MD      sodium chloride  1,000 mL Intravenous Once Blas Spear PA-C      traMADol  50 mg Oral Once ClearKATHY Cruz          Today, Patient Was Seen By: Helen Culp MD    ** Please Note: Dictation voice to text software may have been used in the creation of this document   **

## 2021-01-29 NOTE — ASSESSMENT & PLAN NOTE
Patient confirmed COVID-19 positive 01/16/2021  Initially presented to [facility] with respiratory distress and altered mental status  While at [facility], was intubated due to hypercapnia and hypoxia  He became hypotensive requiring pressor therapy and was transferred to [facility] for further critical care management of severe COVID-19 infection  On severe COVID pathway  · Patient received 1st COVID-19 vaccine 1/8  · Chest CT with minimal ground-glass opacities and left lower lobe consolidation concerning for superimposed bacterial process  · Received convalescent plasma 1/17  · Continue vitamin cocktail, changed to once daily Decadron  · Was on 6 L nasal cannula until he he was found to be obtunded 1/24 with an acute respiratory acidosis and hypercapnic placed on BIPAP  · On HFNC presently, wean as able    · Will repeat CXR

## 2021-01-29 NOTE — PROGRESS NOTES
Progress Note - Infectious Disease   Nadia William 63 y o  male MRN: [id number]  Unit/Bed#: S -01 Encounter: [id number]      Impression/Plan:  1    Mixed Gram Positive Blood cultures   Admission blood cultures were drawn on the 17th at [facility] with 1 of 2 sets growing 2 colony types of coag-negative staph   Repeat blood cultures were drawn on arrival to [address] [place] [address] 1 of 2 blood cultures here are growing Enterococcus faecalis and coag-negative staph  Blood culture contaminants highly suspected with 3 different colony types of CNS and 1 of Enterococcus in same set as CNS and known difficult IV/blood draw access   Patient has AVR 2014  TTE negative for vegetation  1/19/21 repeat blood cultures remain negative at 5 days  1/21 Vancomycin Trough 50 6 and Vancomycin Random 48 8,  1/27 Vancomycin Random level 14    Rec:  · Monitoring off additional antibiotic  · Patient with Vancomycin level slowly trending down  · Will check surveillance blood cultures 2/11/21  · Monitor temperature and hemodynamics  · Monitor CBC with diff     2   Acute Respiratory Failure   Now more hypercapnic related  S/p successful extubation, but now with increased O2 requirements again  Appears to be multifactorial including pulmonary vascular congestion and COVID infection  1/26 PCXR multifocal parenchymal opacities with progression  Rec:  · Monitor respiratory status  · Monitor on O2 requirement on HFNC  · With decreased O2 requirements and elevated inflammatory markers down by greater than 1/2, no role for 2nd Actemra dose     3   COVID-19 infection   S/p successful extubation, but now with increased O2 requirements again  CT more consistent with pulmonary edema than ground-glass opacities   Inflammatory markers are decreased greater than half of peak  Per patient's wife, patient was tested at [facility] 1/6/21 for COVID-19  He then received 1st COVID vaccine on 01/08/2021   On 1/9/21 his COVID-19 PCR from 1/6 came back positive and he was asymptomatic at that point  CRP 30 > 8 > 227 7 > 115 3 > 53 8  Ddimer 1 15 > 0 81 > 1 19  Ferritin 31 > 28 > 579 > 395 >191  1/26 PCXR multifocal parenchymal opacities with progression  Rec:  · Continues COVID aligorhythm with IV Decadron taper  · Continues Lipitor, MVI, vitamin-D   · Patient received convalescent plasma 01/17/2021  · Monitoring inflammatory markers and O2 requirements closely  · With decreased O2 requirements and elevated inflammatory markers down by greater than 1/2, no indication for 2nd Actemra dose     4  PATRICK on CKD  1/2 Creatinine 2 25  Patient had been on HD a year in 2019 1/26 Vancomycin Random level 14  Rec:  · Monitor creatinine  · Nephrology ongoing follow up     5  Morbid obesity   BMI 56 15 Increased risk factor for infection     5  UIF 3695 and s/p remote MSSA bacteremia s/p ceftaroline 6 week course through 3/10/19  Repeat blood cultures negative at 5 days  Rec:  · Check vancomycin random level  · Follow-up surveillance blood cultures once Vancomycin level down     7  Depression  Possibly multifactorial in setting of PATRICK and on IV steroids  Rec:  · Behavioral health following  · He is soft UE restraints to prevent him pulling off his face mask     Antibiotics:  Last Vancomycin dose 1/21/21    Covid Tx:  Decadron on going  Lipitor, MVI, vitamin-D on going  convalescent plasma 01/17/2021  Actemra 1 dose 1/28/21    Above impression and plan discussed in detail with patient, RN, and Dr Anastasiia Chavez         Subjective:  Patient is resting sluggishly on HFNC  He is not hungry with full tray at bedside table  He is HFNC 15 L statting 90% with talking and at rest  1 BM recorded this am     ROS: Patient has no fever, chills, sweats overnight; no nausea, vomiting, diarrhea; no increased cough, shortness of breath      Objective:  Vitals:  Temp:  [97 8 °F (36 6 °C)-98 °F (36 7 °C)] 98 °F (36 7 °C)  HR:  [69-77] 69  Resp:  [20] 20  BP: (109-148)/(56-76) 110/56  SpO2:  [87 %-92 %] 90 %  Temp (24hrs), Av 9 °F (36 6 °C), Min:97 8 °F (36 6 °C), Max:98 °F (36 7 °C)  Current: Temperature: 98 °F (36 7 °C)    General Appearance:  Resting soundly on Face mask O2, sluggish, resting in bed, stating 91% on 15 L   Throat: Oropharynx moist without lesions  Lungs:   Decreased breath sounds throughout auscultation bilaterally; no active cough; respirations unlabored   Heart:  RRR; S1-S2 heard, no murmur   Abdomen:   Soft, non-tender, protuberant, positive bowel sounds  Extremities: + LE edema, arm midline site nontender   : Turner with clear, yellow urine in bag   Skin: No rashes      Labs, Imaging, & Other studies:   All pertinent labs and imaging studies were personally reviewed  Results from last 7 days   Lab Units 21  0552 21  0512 21  0610   WBC Thousand/uL 4 68 4 31 4 37   HEMOGLOBIN g/dL 9 9* 11 2* 10 7*   PLATELETS Thousands/uL 173 184 180     Results from last 7 days   Lab Units 21  0552 21  0512  21  0025  21  1411   POTASSIUM mmol/L 3 3* 3 8   < > 3 5   < >  --    CHLORIDE mmol/L 106 105   < > 108   < >  --    CO2 mmol/L 27 29   < > 29   < >  --    CO2, I-STAT mmol/L  --   --   --   --   --  33*   BUN mg/dL 53* 66*   < > 74*   < >  --    CREATININE mg/dL 1 92* 2 25*   < > 2 71*   < >  --    EGFR ml/min/1 73sq m 37 30   < > 24   < >  --    GLUCOSE, ISTAT mg/dl  --   --   --   --   --  131   CALCIUM mg/dL 7 0* 8 6   < > 8 7   < >  --    AST U/L 17 17  --  21  --   --    ALT U/L 21 28  --  33  --   --    ALK PHOS U/L 45* 55  --  62  --   --     < > = values in this interval not displayed

## 2021-01-29 NOTE — ASSESSMENT & PLAN NOTE
Mixed Gram Positive Blood cultures   Admission blood cultures were drawn on the 17th at [facility] with 1 of 2 sets growing 2 colony types of coag-negative staph   Repeat blood cultures were drawn on arrival to [address] [place] [address] 1 of 2 blood cultures here are growing Enterococcus faecalis and coag-negative staph  Blood culture contaminants highly suspected with 3 different colony types of CNS and 1 of Enterococcus in same set as CNS and known difficult IV/blood draw access  · ID following, input appreciated     · Has hx of AVR 2014, TTE negative for vegetations  · 1/19/21 blood cultures negative   · Monitoring off abx at present; still with supra therapeutic blood levels of vancomycin and vanco retention  · Will need repeat blood cultures next week once retained vanco out of system

## 2021-01-29 NOTE — PLAN OF CARE
Problem: Prexisting or High Potential for Compromised Skin Integrity  Goal: Skin integrity is maintained or improved  Description: INTERVENTIONS:  - Identify patients at risk for skin breakdown  - Assess and monitor skin integrity  - Assess and monitor nutrition and hydration status  - Monitor labs   - Assess for incontinence   - Turn and reposition patient  - Assist with mobility/ambulation  - Relieve pressure over bony prominences  - Avoid friction and shearing  - Provide appropriate hygiene as needed including keeping skin clean and dry  - Evaluate need for skin moisturizer/barrier cream  - Collaborate with interdisciplinary team   - Patient/family teaching  - Consider wound care consult   Outcome: Progressing     Problem: Potential for Falls  Goal: Patient will remain free of falls  Description: INTERVENTIONS:  - Assess patient frequently for physical needs  -  Identify cognitive and physical deficits and behaviors that affect risk of falls  -  [place] fall precautions as indicated by assessment   - Educate patient/family on patient safety including physical limitations  - Instruct patient to call for assistance with activity based on assessment  - Modify environment to reduce risk of injury  - Consider OT/PT consult to assist with strengthening/mobility  Outcome: Progressing     Problem: Nutrition/Hydration-ADULT  Goal: Nutrient/Hydration intake appropriate for improving, restoring or maintaining nutritional needs  Description: Monitor and assess patient's nutrition/hydration status for malnutrition  Collaborate with interdisciplinary team and initiate plan and interventions as ordered  Monitor patient's weight and dietary intake as ordered or per policy  Utilize nutrition screening tool and intervene as necessary  Determine patient's food preferences and provide high-protein, high-caloric foods as appropriate       INTERVENTIONS:  - Monitor oral intake, urinary output, labs, and treatment plans  - Assess nutrition and hydration status and recommend course of action  - Evaluate amount of meals eaten  - Assist patient with eating if necessary   - Allow adequate time for meals  - Recommend/ encourage appropriate diets, oral nutritional supplements, and vitamin/mineral supplements  - Order, calculate, and assess calorie counts as needed  - Recommend, monitor, and adjust tube feedings and TPN/PPN based on assessed needs  - Assess need for intravenous fluids  - Provide specific nutrition/hydration education as appropriate  - Include patient/family/caregiver in decisions related to nutrition  Outcome: Progressing     Problem: NEUROSENSORY - ADULT  Goal: Achieves stable or improved neurological status  Description: INTERVENTIONS  - Monitor and report changes in neurological status  - Monitor vital signs such as temperature, blood pressure, glucose, and any other labs ordered   - Initiate measures to prevent increased intracranial pressure  - Monitor for seizure activity and implement precautions if appropriate      Outcome: Progressing  Goal: Achieves maximal functionality and self care  Description: INTERVENTIONS  - Monitor swallowing and airway patency with patient fatigue and changes in neurological status  - Encourage and assist patient to increase activity and self care     - Encourage visually impaired, hearing impaired and aphasic patients to use assistive/communication devices  Outcome: Progressing     Problem: CARDIOVASCULAR - ADULT  Goal: Maintains optimal cardiac output and hemodynamic stability  Description: INTERVENTIONS:  - Monitor I/O, vital signs and rhythm  - Monitor for S/S and trends of decreased cardiac output  - Administer and titrate ordered vasoactive medications to optimize hemodynamic stability  - Assess quality of pulses, skin color and temperature  - Assess for signs of decreased coronary artery perfusion  - Instruct patient to report change in severity of symptoms  Outcome: Progressing  Goal: Absence of cardiac dysrhythmias or at baseline rhythm  Description: INTERVENTIONS:  - Continuous cardiac monitoring, vital signs, obtain 12 lead EKG if ordered  - Administer antiarrhythmic and heart rate control medications as ordered  - Monitor electrolytes and administer replacement therapy as ordered  Outcome: Progressing     Problem: RESPIRATORY - ADULT  Goal: Achieves optimal ventilation and oxygenation  Description: INTERVENTIONS:  - Assess for changes in respiratory status  - Assess for changes in mentation and behavior  - Position to facilitate oxygenation and minimize respiratory effort  - Oxygen administered by appropriate delivery if ordered  - Initiate smoking cessation education as indicated  - Encourage broncho-pulmonary hygiene including cough, deep breathe, Incentive Spirometry  - Assess the need for suctioning and aspirate as needed  - Assess and instruct to report SOB or any respiratory difficulty  - Respiratory Therapy support as indicated  Outcome: Progressing     Problem: GENITOURINARY - ADULT  Goal: Maintains or returns to baseline urinary function  Description: INTERVENTIONS:  - Assess urinary function  - Encourage oral fluids to ensure adequate hydration if ordered  - Administer IV fluids as ordered to ensure adequate hydration  - Administer ordered medications as needed  - Offer frequent toileting  - Follow urinary retention protocol if ordered  Outcome: Progressing  Goal: Absence of urinary retention  Description: INTERVENTIONS:  - Assess patients ability to void and empty bladder  - Monitor I/O  - Bladder scan as needed  - Discuss with physician/AP medications to alleviate retention as needed  - Discuss catheterization for long term situations as appropriate  Outcome: Progressing  Goal: Urinary catheter remains patent  Description: INTERVENTIONS:  - Assess patency of urinary catheter  - If patient has a chronic bo, consider changing catheter if non-functioning  - Follow guidelines for intermittent irrigation of non-functioning urinary catheter  Outcome: Progressing     Problem: METABOLIC, FLUID AND ELECTROLYTES - ADULT  Goal: Electrolytes maintained within normal limits  Description: INTERVENTIONS:  - Monitor labs and assess patient for signs and symptoms of electrolyte imbalances  - Administer electrolyte replacement as ordered  - Monitor response to electrolyte replacements, including repeat lab results as appropriate  - Instruct patient on fluid and nutrition as appropriate  Outcome: Progressing  Goal: Fluid balance maintained  Description: INTERVENTIONS:  - Monitor labs   - Monitor I/O and WT  - Instruct patient on fluid and nutrition as appropriate  - Assess for signs & symptoms of volume excess or deficit  Outcome: Progressing  Goal: Glucose maintained within target range  Description: INTERVENTIONS:  - Monitor Blood Glucose as ordered  - Assess for signs and symptoms of hyperglycemia and hypoglycemia  - Administer ordered medications to maintain glucose within target range  - Assess nutritional intake and initiate nutrition service referral as needed  Outcome: Progressing     Problem: MUSCULOSKELETAL - ADULT  Goal: Maintain or return mobility to safest level of function  Description: INTERVENTIONS:  - Assess patient's ability to carry out ADLs; assess patient's baseline for ADL function and identify physical deficits which impact ability to perform ADLs (bathing, care of mouth/teeth, toileting, grooming, dressing, etc )  - Assess/evaluate cause of self-care deficits   - Assess range of motion  - Assess patient's mobility  - Assess patient's need for assistive devices and provide as appropriate  - Encourage maximum independence but intervene and supervise when necessary  - Involve family in performance of ADLs  - Assess for home care needs following discharge   - Consider OT consult to assist with ADL evaluation and planning for discharge  - Provide patient education as appropriate  Outcome: Progressing  Goal: Maintain proper alignment of affected body part  Description: INTERVENTIONS:  - Support, maintain and protect limb and body alignment  - Provide patient/ family with appropriate education  Outcome: Progressing     Problem: INFECTION - ADULT  Goal: Absence or prevention of progression during hospitalization  Description: INTERVENTIONS:  - Assess and monitor for signs and symptoms of infection  - Monitor lab/diagnostic results  - Monitor all insertion sites, i e  indwelling lines, tubes, and drains  - Monitor endotracheal if appropriate and nasal secretions for changes in amount and color  - [place] appropriate cooling/warming therapies per order  - Administer medications as ordered  - Instruct and encourage patient and family to use good hand hygiene technique  - Identify and instruct in appropriate isolation precautions for identified infection/condition  Outcome: Progressing     Problem: DISCHARGE PLANNING  Goal: Discharge to home or other facility with appropriate resources  Description: INTERVENTIONS:  - Identify barriers to discharge w/patient and caregiver  - Arrange for needed discharge resources and transportation as appropriate  - Identify discharge learning needs (meds, wound care, etc )  - Arrange for interpretive services to assist at discharge as needed  - Refer to Case Management Department for coordinating discharge planning if the patient needs post-hospital services based on physician/advanced practitioner order or complex needs related to functional status, cognitive ability, or social support system  Outcome: Progressing     Problem: Knowledge Deficit  Goal: Patient/family/caregiver demonstrates understanding of disease process, treatment plan, medications, and discharge instructions  Description: Complete learning assessment and assess knowledge base    Interventions:  - Provide teaching at level of understanding  - Provide teaching via preferred learning methods  Outcome: Progressing

## 2021-01-29 NOTE — ASSESSMENT & PLAN NOTE
Had an episode of AFib with RVR and associated hypotension with rapid response  Responded favorably to IV Lopressor  This is likely due to the fact that patient missed, his beta-blocker doses evening due to hold parameters not being met which have since been adjusted  · Heart rate now is back to normal   · Per last cardiology note 10/2020 Pt was not on AC due to life threatening GI bleed  Dr Chante Navarrete has spoken with Pt on multiple occasions about Watchman device pt has been declining    · Will continue lovenox prophylactic 40 mg daily, would ideally need higher dosing due to weight though cannot in the setting of his renal dysfunction, will increase Lovenox as renal function improves

## 2021-01-29 NOTE — ASSESSMENT & PLAN NOTE
Resolved  · Secondary to hypoxia and hypercapnia with history of stroke and residual left-sided weakness, patient initially was intubated at [facility] for both hypoxia and hypercapnia and was extubated in ICU here at [facility]  Continue to monitor follow mentation closely  Patient appears to have severe depression  Avoid sedating medications  · Melatonin q h s

## 2021-01-29 NOTE — SPEECH THERAPY NOTE
Speech Language/Pathology    Speech/Language Pathology Progress Note    Patient Name: Jean Carlos Guerra  [id number] Date: 1/29/2021       Subjective:  Pt seen for dysphagia tx at lunch, pt on NRB w/ HFNC- max O2  Spoke w/ nsg, pt ok to have NRB removed for po, monitor O2 sats  Objective:  O2 sats 90% initially  NRB removed for po  Pt trialed w/ dysphagia 2 diet-chopped stuffed shells; pt refused chopped carrots and all NTL, stated he didn't like juice or water  Pt w/ adequate bolus retrieval from spoon, slow, somewhat prolonged mastication/manipulation  Mild oral residue noted x1, but independently cleared  Swallows were timely and complete  No coughing or choking noted with stuff shells  After several bites, pt's O2 sats decreased to 84%, NRB replaced  Pt stated he could breathe better and did not want anymore to eat  Assessment:  Pt appeared able to tolerate dysphagia 2 mech soft foods, however given resp status, pt would prob be best remaining on puree diet given limited time being able to tolerate being off NRB  Plan/Recommendations: Will cont to follow x1-3x pending medical status      Catalina Riggins CCC-SLP  Speech Pathogist  Available via  tiger text

## 2021-01-29 NOTE — PROGRESS NOTES
NEPHROLOGY PROGRESS NOTE   Zeb Ontiveros 63 y o  male MRN: [id number]  Unit/Bed#: S -01 Encounter: [id number]  Reason for Consult: PATRICK/CKD    ASSESSMENT/PLAN:  1  Acute Kidney Injury, POA- likely secondary to ATN in the setting of COVID 19 cytokine release + contrast induced nephropathy +/- vancomycin toxicity  - creatinine pleateaued at 2 5 for the past 4 days and today is improved at 1 9  - UA: large blood, innumerable RBC, trace leuks, 4-10 wbc, moderate bacteria, trace ketones, 2+ protein, 2-3 coarse granular casts  - CPK: 76 on admission  - bo catheter in place, nonoliguric  2  Chronic Kidney disease stage III- Baseline creatinine 1-1  3   Follows with nephrologist in [facility]     3  Volume Status- receiving bumex 2mg po BID  - patient remains fluid overloaded  - continue current dose of bumex as it was just increased 1/28  - if urine output drops or breathing worsens, low threshold to increase diuretics  4  Hypokalemia- replete with kdur 40meq oral  5  Hypernatremia- secondary to poor oral intake, now improved  - discontinue D5W  - encourage oral intake  - if sodium rises, would resume D5W  6  Anemia- mild  7  Hypertension- BP controlled  8  A fib- per primary team  9  Bacteremia- ID on board  - off abx currently  - monitoring vancomycin level as it was elevated on 1/22  10  COVID 19 Infection- per primary team    Disposition:  Continue bumex  Replete K  Discontinue D5W  Discussed with nurse  SUBJECTIVE:  Patient is depressed per nurse  Needs to be spoon feed by speech  Remains on high flow with pitting edema      OBJECTIVE:  Current Weight: Weight - Scale: (!) 184 kg (406 lb 1 4 oz)  Vitals:    01/29/21 0352 01/29/21 0557 01/29/21 0806 01/29/21 0902   BP:   110/56    BP Location:   Right arm    Pulse:   69    Resp:   20    Temp:   98 °F (36 7 °C)    TempSrc:   Axillary    SpO2: 90%  91% 91%   Weight:  (!) 184 kg (406 lb 1 4 oz)     Height:           Intake/Output Summary (Last 24 hours) at 1/29/2021 1127  Last data filed at 1/29/2021 1030  Gross per 24 hour   Intake 870 ml   Output 2300 ml   Net -1430 ml     Due to COVID 19 infection, patient was seen through the window   Discussed with nurse, exam findings below from my visual exam and nurse at bedside       General: NAD  Skin: no rash  Respiratory: not labored but on high flow NC  Cardiac: regular on rhythm  Extremities: ++ LE edema  GI: not distended  Neuro: alert and oriented  Psych: flat affect, depressed    Medications:    Current Facility-Administered Medications:     acetaminophen (TYLENOL) tablet 650 mg, 650 mg, Oral, Q6H PRN, Yareli Haynes PA-C, 650 mg at 01/28/21 2057    aspirin (ECOTRIN LOW STRENGTH) EC tablet 81 mg, 81 mg, Oral, Daily, Yareli Haynes PA-C, 81 mg at 01/29/21 1002    atorvastatin (LIPITOR) tablet 40 mg, 40 mg, Oral, HS, KATHY Hathaway, 40 mg at 01/28/21 2259    bisacodyl (DULCOLAX) rectal suppository 10 mg, 10 mg, Rectal, Daily PRN, Yareli Haynes PA-C    bumetanide (BUMEX) tablet 2 mg, 2 mg, Oral, BID, Kandi Salts, DO, 2 mg at 01/29/21 1007    chlorhexidine (PERIDEX) 0 12 % oral rinse 15 mL, 15 mL, Swish & Spit, Q12H [address], Yareli Haynes PA-C, 15 mL at 01/29/21 1003    cholecalciferol (VITAMIN D3) tablet 2,000 Units, 2,000 Units, Per NG Tube, Daily, Yareli Haynes PA-C, 2,000 Units at 01/29/21 1001    dexamethasone (DECADRON) injection 6 mg, 6 mg, Intravenous, Daily, Lionel Robbins PA-C, 6 mg at 01/29/21 1002    diltiazem (CARDIZEM SR) 12 hr capsule 90 mg, 90 mg, Oral, Q12H [address], Pablo Ferguson MD, 90 mg at 01/29/21 1004    docusate sodium (COLACE) capsule 100 mg, 100 mg, Oral, BID, KATHY Bloom, 100 mg at 01/29/21 1003    enoxaparin (LOVENOX) subcutaneous injection 40 mg, 40 mg, Subcutaneous, Q24H [address], Lionel Robbins PA-C, 40 mg at 01/29/21 1002    melatonin tablet 3 mg, 3 mg, Oral, HS PRN, KATHY Bloom, 3 mg at 01/22/21 2201    methocarbamol (ROBAXIN) tablet 500 mg, 500 mg, Oral, Q8H PRN, Clearence Bake, CRNP, 500 mg at 01/23/21 1343    metoprolol (LOPRESSOR) injection 5 mg, 5 mg, Intravenous, Q6H PRN, Neptali Amador MD, 5 mg at 01/26/21 1338    metoprolol tartrate (LOPRESSOR) tablet 50 mg, 50 mg, Oral, TID, Blas Spear PA-C, 50 mg at 01/29/21 1002    [COMPLETED] zinc sulfate (ZINCATE) capsule 220 mg, 220 mg, Per NG Tube, Daily, 220 mg at 01/24/21 0824 **FOLLOWED BY** multivitamin with iron-minerals liquid 15 mL, 15 mL, Per NG Tube, Daily, Yareli Haynes PA-C, 15 mL at 01/28/21 0918    pantoprazole (PROTONIX) EC tablet 40 mg, 40 mg, Oral, Early Morning, Yareli Haynes PA-C, 40 mg at 01/29/21 0547    polyethylene glycol (MIRALAX) packet 17 g, 17 g, Oral, BID, Clearence Bake, CRNP, 17 g at 01/26/21 2112    senna-docusate sodium (SENOKOT S) 8 6-50 mg per tablet 1 tablet, 1 tablet, Oral, HS, Mariajose Evans PA-C    sertraline (ZOLOFT) tablet 125 mg, 125 mg, Oral, Daily, Vita Gonzalez MD, 125 mg at 01/29/21 1001    sodium chloride 0 9 % bolus 1,000 mL, 1,000 mL, Intravenous, Once, Blas Spear PA-C    traMADol [place]) tablet 50 mg, 50 mg, Oral, Once, Clearence Bake, CRNP    Laboratory Results:  Results from last 7 days   Lab Units 01/29/21  0552 01/28/21  0512 01/27/21  3573 01/27/21  0610 01/26/21  0522 01/26/21  0025 01/25/21  0438 01/24/21  1411 01/24/21  0629  01/24/21  0532 01/23/21  0450   WBC Thousand/uL 4 68 4 31  --  4 37  --  6 13 9 95  --   --   --  10 68* 9 56   HEMOGLOBIN g/dL 9 9* 11 2*  --  10 7*  --  11 0* 11 2*  --   --   --  10 9* 11 0*   I STAT HEMOGLOBIN g/dl  --   --   --   --   --   --   --  12 9 11 9*  --   --   --    HEMATOCRIT % 34 0* 38 1  --  36 7  --  37 1 37 8  --   --   --  37 8 38 4   HEMATOCRIT, ISTAT %  --   --   --   --   --   --   --  38 35*  --   --   --    PLATELETS Thousands/uL 173 184  --  180  --  165 148*  --   --   --  123* 108*   POTASSIUM mmol/L 3 3* 3 8 3 6  --  3 2* 3 5 3 5  --   --   --  3 4* 3 1*   CHLORIDE mmol/L 106 105 112*  --  107 108 105  --   --   --  105 102   CO2 mmol/L 27 29 30  --  28 29 26  --   --   --  24 27   CO2, I-STAT mmol/L  --   --   --   --   --   --   --  33* 29   < >  --   --    BUN mg/dL 53* 66* 70*  --  72* 74* 74*  --   --   --  70* 71*   CREATININE mg/dL 1 92* 2 25* 2 40*  --  2 54* 2 71* 2 49*  --   --   --  2 48* 2 56*   CALCIUM mg/dL 7 0* 8 6 8 7  --  8 8 8 7 8 6  --   --   --  8 3 7 7*   MAGNESIUM mg/dL  --   --  2 8*  --   --   --  2 9*  --   --   --  2 2  --    GLUCOSE, ISTAT mg/dl  --   --   --   --   --   --   --  131 109  --   --   --     < > = values in this interval not displayed

## 2021-01-30 LAB
ALBUMIN SERPL BCP-MCNC: 2.3 G/DL (ref 3.5–5)
ALP SERPL-CCNC: 58 U/L (ref 46–116)
ALT SERPL W P-5'-P-CCNC: 27 U/L (ref 12–78)
ANION GAP SERPL CALCULATED.3IONS-SCNC: 6 MMOL/L (ref 4–13)
AST SERPL W P-5'-P-CCNC: 21 U/L (ref 5–45)
BASOPHILS # BLD AUTO: 0.01 THOUSANDS/ΜL (ref 0–0.1)
BASOPHILS NFR BLD AUTO: 0 % (ref 0–1)
BILIRUB SERPL-MCNC: 0.38 MG/DL (ref 0.2–1)
BUN SERPL-MCNC: 57 MG/DL (ref 5–25)
CALCIUM ALBUM COR SERPL-MCNC: 9.4 MG/DL (ref 8.3–10.1)
CALCIUM SERPL-MCNC: 8 MG/DL (ref 8.3–10.1)
CHLORIDE SERPL-SCNC: 104 MMOL/L (ref 100–108)
CO2 SERPL-SCNC: 31 MMOL/L (ref 21–32)
CREAT SERPL-MCNC: 2.12 MG/DL (ref 0.6–1.3)
CRP SERPL QL: 42.8 MG/L
EOSINOPHIL # BLD AUTO: 0 THOUSAND/ΜL (ref 0–0.61)
EOSINOPHIL NFR BLD AUTO: 0 % (ref 0–6)
ERYTHROCYTE [DISTWIDTH] IN BLOOD BY AUTOMATED COUNT: 18.1 % (ref 11.6–15.1)
FERRITIN SERPL-MCNC: 158 NG/ML (ref 8–388)
GFR SERPL CREATININE-BSD FRML MDRD: 33 ML/MIN/1.73SQ M
GLUCOSE SERPL-MCNC: 128 MG/DL (ref 65–140)
GLUCOSE SERPL-MCNC: 133 MG/DL (ref 65–140)
GLUCOSE SERPL-MCNC: 138 MG/DL (ref 65–140)
GLUCOSE SERPL-MCNC: 146 MG/DL (ref 65–140)
GLUCOSE SERPL-MCNC: 162 MG/DL (ref 65–140)
GLUCOSE SERPL-MCNC: 168 MG/DL (ref 65–140)
HCT VFR BLD AUTO: 39.3 % (ref 36.5–49.3)
HGB BLD-MCNC: 11.1 G/DL (ref 12–17)
IMM GRANULOCYTES # BLD AUTO: 0.15 THOUSAND/UL (ref 0–0.2)
IMM GRANULOCYTES NFR BLD AUTO: 2 % (ref 0–2)
LYMPHOCYTES # BLD AUTO: 0.26 THOUSANDS/ΜL (ref 0.6–4.47)
LYMPHOCYTES NFR BLD AUTO: 4 % (ref 14–44)
MCH RBC QN AUTO: 22.8 PG (ref 26.8–34.3)
MCHC RBC AUTO-ENTMCNC: 28.2 G/DL (ref 31.4–37.4)
MCV RBC AUTO: 81 FL (ref 82–98)
MONOCYTES # BLD AUTO: 0.21 THOUSAND/ΜL (ref 0.17–1.22)
MONOCYTES NFR BLD AUTO: 3 % (ref 4–12)
NEUTROPHILS # BLD AUTO: 5.95 THOUSANDS/ΜL (ref 1.85–7.62)
NEUTS SEG NFR BLD AUTO: 91 % (ref 43–75)
NRBC BLD AUTO-RTO: 0 /100 WBCS
PLATELET # BLD AUTO: 195 THOUSANDS/UL (ref 149–390)
PMV BLD AUTO: 11.1 FL (ref 8.9–12.7)
POTASSIUM SERPL-SCNC: 4.2 MMOL/L (ref 3.5–5.3)
PROT SERPL-MCNC: 6.1 G/DL (ref 6.4–8.2)
RBC # BLD AUTO: 4.87 MILLION/UL (ref 3.88–5.62)
SODIUM SERPL-SCNC: 141 MMOL/L (ref 136–145)
WBC # BLD AUTO: 6.58 THOUSAND/UL (ref 4.31–10.16)

## 2021-01-30 PROCEDURE — 94760 N-INVAS EAR/PLS OXIMETRY 1: CPT

## 2021-01-30 PROCEDURE — 82728 ASSAY OF FERRITIN: CPT | Performed by: INTERNAL MEDICINE

## 2021-01-30 PROCEDURE — 80053 COMPREHEN METABOLIC PANEL: CPT | Performed by: INTERNAL MEDICINE

## 2021-01-30 PROCEDURE — 99232 SBSQ HOSP IP/OBS MODERATE 35: CPT | Performed by: INTERNAL MEDICINE

## 2021-01-30 PROCEDURE — 82948 REAGENT STRIP/BLOOD GLUCOSE: CPT

## 2021-01-30 PROCEDURE — 85025 COMPLETE CBC W/AUTO DIFF WBC: CPT | Performed by: INTERNAL MEDICINE

## 2021-01-30 PROCEDURE — 86140 C-REACTIVE PROTEIN: CPT | Performed by: INTERNAL MEDICINE

## 2021-01-30 RX ORDER — ALBUMIN (HUMAN) 12.5 G/50ML
25 SOLUTION INTRAVENOUS ONCE
Status: COMPLETED | OUTPATIENT
Start: 2021-01-30 | End: 2021-01-30

## 2021-01-30 RX ADMIN — CHLORHEXIDINE GLUCONATE 0.12% ORAL RINSE 15 ML: 1.2 LIQUID ORAL at 21:11

## 2021-01-30 RX ADMIN — ENOXAPARIN SODIUM 40 MG: 40 INJECTION SUBCUTANEOUS at 08:55

## 2021-01-30 RX ADMIN — METHOCARBAMOL TABLETS 500 MG: 500 TABLET, COATED ORAL at 21:11

## 2021-01-30 RX ADMIN — DOCUSATE SODIUM AND SENNOSIDES 1 TABLET: 8.6; 5 TABLET ORAL at 21:11

## 2021-01-30 RX ADMIN — DOCUSATE SODIUM 100 MG: 100 CAPSULE, LIQUID FILLED ORAL at 08:55

## 2021-01-30 RX ADMIN — SERTRALINE 125 MG: 25 TABLET, FILM COATED ORAL at 08:55

## 2021-01-30 RX ADMIN — METOPROLOL TARTRATE 50 MG: 50 TABLET, FILM COATED ORAL at 17:01

## 2021-01-30 RX ADMIN — ALBUMIN (HUMAN) 25 G: 0.25 INJECTION, SOLUTION INTRAVENOUS at 09:13

## 2021-01-30 RX ADMIN — BUMETANIDE 2 MG: 1 TABLET ORAL at 09:13

## 2021-01-30 RX ADMIN — ASPIRIN 81 MG: 81 TABLET ORAL at 08:55

## 2021-01-30 RX ADMIN — CHLORHEXIDINE GLUCONATE 0.12% ORAL RINSE 15 ML: 1.2 LIQUID ORAL at 08:56

## 2021-01-30 RX ADMIN — DOCUSATE SODIUM 100 MG: 100 CAPSULE, LIQUID FILLED ORAL at 17:01

## 2021-01-30 RX ADMIN — BUMETANIDE 2 MG: 1 TABLET ORAL at 17:01

## 2021-01-30 RX ADMIN — Medication 3 MG: at 21:11

## 2021-01-30 RX ADMIN — Medication 2000 UNITS: at 08:55

## 2021-01-30 RX ADMIN — DEXAMETHASONE SODIUM PHOSPHATE 6 MG: 4 INJECTION INTRA-ARTICULAR; INTRALESIONAL; INTRAMUSCULAR; INTRAVENOUS; SOFT TISSUE at 08:55

## 2021-01-30 RX ADMIN — DILTIAZEM HYDROCHLORIDE 90 MG: 90 CAPSULE, EXTENDED RELEASE ORAL at 08:55

## 2021-01-30 RX ADMIN — METOPROLOL TARTRATE 50 MG: 50 TABLET, FILM COATED ORAL at 21:11

## 2021-01-30 RX ADMIN — POLYETHYLENE GLYCOL 3350 17 G: 17 POWDER, FOR SOLUTION ORAL at 21:11

## 2021-01-30 RX ADMIN — MULTIVITAMIN 15 ML: LIQUID ORAL at 08:56

## 2021-01-30 RX ADMIN — ATORVASTATIN CALCIUM 40 MG: 40 TABLET, FILM COATED ORAL at 21:11

## 2021-01-30 RX ADMIN — DILTIAZEM HYDROCHLORIDE 90 MG: 90 CAPSULE, EXTENDED RELEASE ORAL at 21:17

## 2021-01-30 RX ADMIN — POLYETHYLENE GLYCOL 3350 17 G: 17 POWDER, FOR SOLUTION ORAL at 08:56

## 2021-01-30 RX ADMIN — METOPROLOL TARTRATE 50 MG: 50 TABLET, FILM COATED ORAL at 08:55

## 2021-01-30 NOTE — ASSESSMENT & PLAN NOTE
Mixed Gram Positive Blood cultures   Admission blood cultures were drawn on the 17th at [facility] with 1 of 2 sets growing 2 colony types of coag-negative staph   Repeat blood cultures were drawn on arrival to [address] [place] [address] 1 of 2 blood cultures here are growing Enterococcus faecalis and coag-negative staph  Blood culture contaminants highly suspected with 3 different colony types of CNS and 1 of Enterococcus in same set as CNS and known difficult IV/blood draw access  · Has hx of AVR 2014, TTE negative for vegetations  · 1/19/21 blood cultures negative   · Monitoring off abx at present  · Infectious Disease input noted  · Surveillance blood cultures with Infectious Disease

## 2021-01-30 NOTE — ASSESSMENT & PLAN NOTE
Had an episode of AFib with RVR and associated hypotension with rapid response  Responded favorably to IV Lopressor  This is likely due to the fact that patient missed, his beta-blocker doses evening due to hold parameters not being met which have since been adjusted  · Per last cardiology note 10/2020 Pt was not on AC due to life threatening GI bleed  Dr Harriet Ghotra has spoken with Pt on multiple occasions about Watchman device pt has been declining

## 2021-01-30 NOTE — PROGRESS NOTES
Progress Note - Nephrology   Moises Putnam 63 y o  male MRN: [id number]  Unit/Bed#: S -01 Encounter: [id number]    Assessment:  1  Acute kidney injury present on admission:  Likely secondary to ATN in the setting of COVID-19 plus or minus dye nephropathy and vancomycin toxicity  Baseline creatinine is in the low 1 range  Creatinine had plateaued at 2 5 it was 1 9 yesterday 2 1 today  Maintain same diuretics to try to keep the patient even net negative will give 1 dose of intravenous albumin  2  Stage 3 chronic kidney disease:  Baseline creatinine is 1-1 3 follows with another nephrology group in [facility] area  3  Volume status:  Even try to keep the patient is net negative is possible he has been -10 L since admission  I think we may be the equilibration point in terms of fluid status  Maintain twice daily Bumex right now give 1 dose of intravenous albumin    4  COVID-19 severe pathway:  Management per primary team    Plan:  As above      Subjective:   Patient seen in follow-up for acute kidney injury on chronic kidney disease  Patient currently on BiPAP  He is being treated for COVID-19 infection on severe pathway  Patient seen this morning was on BiPAP  Systolic blood pressure has been 1 way to 120  Patient is currently on BiPAP has been on high-flow nasal cannula 40 liters/minute  We had increased his Bumex to twice daily to try to make the patient net negative a few days ago  Objective:     Vitals: Blood pressure 112/63, pulse 74, temperature 98 4 °F (36 9 °C), temperature source Axillary, resp  rate 20, height 5' 11" (1 803 m), weight (!) 184 kg (406 lb 1 4 oz), SpO2 91 %  ,Body mass index is 56 64 kg/m²      Weight (last 2 days)     Date/Time   Weight    01/29/21 0557   (!) 184 (406 09)                Intake/Output Summary (Last 24 hours) at 1/30/2021 0821  Last data filed at 1/29/2021 1746  Gross per 24 hour   Intake 1290 ml   Output 1000 ml   Net 290 ml       Urethral Catheter Straight-tip 16 Fr  (Active)   Reasons to continue Urinary Catheter  Accurate I&O assessment in critically ill patients (48 hr  max) 01/29/21 1353   Goal for Removal Voiding trial when ambulation improves 01/29/21 1353   Site Assessment Clean;Skin intact 01/29/21 1353   Collection Container Standard drainage bag 01/29/21 1353   Securement Method Securing device (Describe) 01/29/21 1353   Irrigant Normal saline 01/26/21 1857   Urethral Irrigation Intake (mL) 20 mL 01/26/21 1857   Output (mL) 1000 mL 01/29/21 1746       Physical Exam:  Given COVID-19 infection and preservation of PPE patient was seen and examined through a glass window  General: patient is currently compensated on BiPAP pulse ox in the low 90s  Skin:  No new rash noted  Eyes:  No scleral icterus reported  ENT:  Patient is currently on BiPAP  Chest:  Patient does not appear to be in acute respiratory distress  Abdomen:  Obese   Extremities:   There is leg edema noted  Neuro:  Appears to be nonfocal                Medications Prior to Admission   Medication    acetaminophen (TYLENOL) 325 mg tablet    allopurinol (ZYLOPRIM) 100 mg tablet    aspirin (ECOTRIN LOW STRENGTH) 81 mg EC tablet    atorvastatin (LIPITOR) 40 mg tablet    b complex-vitamin C-folic acid (NEPHROCAPS) 1 mg capsule    bisacodyl (DULCOLAX) 10 mg suppository    bumetanide (BUMEX) 1 mg tablet    Cholecalciferol (VITAMIN D3) 18402 units TABS    diltiazem (CARDIZEM CD) 180 mg 24 hr capsule    enoxaparin (LOVENOX) 40 mg/0 4 mL    magnesium oxide (MAG-OX) 400 mg    metoprolol tartrate (LOPRESSOR) 50 mg tablet    pantoprazole (PROTONIX) 40 mg tablet    POTASSIUM CHLORIDE PO    Probiotic Product (BACID PO)    sertraline (ZOLOFT) 100 mg tablet    sertraline (ZOLOFT) 25 mg tablet    traMADol (ULTRAM) 50 mg tablet       Current Facility-Administered Medications   Medication Dose Route Frequency    acetaminophen (TYLENOL) tablet 650 mg  650 mg Oral Q6H PRN    aspirin (ECOTRIN LOW STRENGTH) EC tablet 81 mg  81 mg Oral Daily    atorvastatin (LIPITOR) tablet 40 mg  40 mg Oral HS    bisacodyl (DULCOLAX) rectal suppository 10 mg  10 mg Rectal Daily PRN    bumetanide (BUMEX) tablet 2 mg  2 mg Oral BID    chlorhexidine (PERIDEX) 0 12 % oral rinse 15 mL  15 mL Swish & Spit Q12H [address]    cholecalciferol (VITAMIN D3) tablet 2,000 Units  2,000 Units Per NG Tube Daily    dexamethasone (DECADRON) injection 6 mg  6 mg Intravenous Daily    diltiazem (CARDIZEM SR) 12 hr capsule 90 mg  90 mg Oral Q12H [address]    docusate sodium (COLACE) capsule 100 mg  100 mg Oral BID    enoxaparin (LOVENOX) subcutaneous injection 40 mg  40 mg Subcutaneous Q24H [address]    melatonin tablet 3 mg  3 mg Oral HS PRN    methocarbamol (ROBAXIN) tablet 500 mg  500 mg Oral Q8H PRN    metoprolol (LOPRESSOR) injection 5 mg  5 mg Intravenous Q6H PRN    metoprolol tartrate (LOPRESSOR) tablet 50 mg  50 mg Oral TID    multivitamin with iron-minerals liquid 15 mL  15 mL Per NG Tube Daily    pantoprazole (PROTONIX) EC tablet 40 mg  40 mg Oral Early Morning    polyethylene glycol (MIRALAX) packet 17 g  17 g Oral BID    senna-docusate sodium (SENOKOT S) 8 6-50 mg per tablet 1 tablet  1 tablet Oral HS    sertraline (ZOLOFT) tablet 125 mg  125 mg Oral Daily    sodium chloride 0 9 % bolus 1,000 mL  1,000 mL Intravenous Once    traMADol (ULTRAM) tablet 50 mg  50 mg Oral Once        Lab, Imaging and other studies: I have personally reviewed pertinent labs    CBC:   Lab Results   Component Value Date    WBC 6 58 01/30/2021    WBC 6 71 11/02/2014    RBC 4 87 01/30/2021    RBC 4 84 11/02/2014     CMP:   Lab Results   Component Value Date     04/09/2015     01/30/2021     04/09/2015    CO2 31 01/30/2021    CO2 33 (H) 01/24/2021    ANIONGAP 13 04/09/2015    BUN 57 (H) 01/30/2021    BUN 17 04/09/2015    CREATININE 2 12 (H) 01/30/2021    CREATININE 0 67 04/09/2015    GLUCOSE 131 01/24/2021    GLUCOSE 92 04/09/2015 CALCIUM 8 0 (L) 01/30/2021    CALCIUM 8 4 04/09/2015    AST 21 01/30/2021    AST 15 10/30/2014    ALT 27 01/30/2021    ALT 22 10/30/2014    ALKPHOS 58 01/30/2021    ALKPHOS 104 10/30/2014    PROT 7 7 10/30/2014    BILITOT 0 4 10/30/2014    EGFR 33 01/30/2021     Phosphorus:   Lab Results   Component Value Date    PHOS 4 3 03/05/2019     Magnesium:   Lab Results   Component Value Date    MG 2 8 (H) 01/27/2021    MG 2 1 09/05/2014     Urinalysis:   Lab Results   Component Value Date    COLORU Dark Neda 01/22/2021    COLORU Yellow 10/30/2014    CLARITYU Cloudy 01/22/2021    CLARITYU Clear 10/30/2014    SPECGRAV 1 020 01/22/2021    SPECGRAV 1 009 10/30/2014    PHUR 5 5 01/22/2021    PHUR 5 5 01/23/2019    PHUR 5 0 10/30/2014    LEUKOCYTESUR Trace (A) 01/22/2021    LEUKOCYTESUR Negative 10/30/2014    NITRITE Negative 01/22/2021    NITRITE Negative 10/30/2014    PROTEINUA Negative 10/30/2014    GLUCOSEU Negative 01/22/2021    GLUCOSEU Negative 10/30/2014    KETONESU Trace (A) 01/22/2021    KETONESU Negative 10/30/2014    BILIRUBINUR Negative 01/22/2021    BILIRUBINUR Negative 10/30/2014    BLOODU Large (A) 01/22/2021    BLOODU Negative 10/30/2014     BMP:   Lab Results   Component Value Date    GLUCOSE 131 01/24/2021    GLUCOSE 92 04/09/2015    SODIUM 141 01/30/2021    CO2 31 01/30/2021    CO2 33 (H) 01/24/2021    BUN 57 (H) 01/30/2021    BUN 17 04/09/2015    CREATININE 2 12 (H) 01/30/2021    CREATININE 0 67 04/09/2015    CALCIUM 8 0 (L) 01/30/2021    CALCIUM 8 4 04/09/2015     ABGs:   Lab Results   Component Value Date    PH 7 091 (LL) 01/24/2021

## 2021-01-30 NOTE — ASSESSMENT & PLAN NOTE
Resolved  · Secondary to hypoxia and hypercapnia with history of stroke and residual left-sided weakness, patient initially was intubated at [facility] for both hypoxia and hypercapnia and was extubated in ICU here at [facility]  Continue to monitor follow mentation closely  Patient appears to have severe depression  Avoid sedating medications  · Melatonin q h s

## 2021-01-30 NOTE — ASSESSMENT & PLAN NOTE
Patient confirmed COVID-19 positive 01/16/2021  Initially presented to [address] [facility] [facility] with respiratory distress and altered mental status  While at [facility], was intubated due to hypercapnia and hypoxia  He became hypotensive requiring pressor therapy and was transferred to [facility] for further critical care management of severe COVID-19 infection  On severe COVID pathway  · Patient received 1st COVID-19 vaccine 1/8  · Chest CT with minimal ground-glass opacities and left lower lobe consolidation concerning for superimposed bacterial process  · Received convalescent plasma 1/17  · Continue vitamin cocktail, changed to once daily Decadron  · Was on 6 L nasal cannula until he he was found to be obtunded 1/24 with an acute respiratory acidosis and hypercapnic placed on BIPAP  · On HFNC presently, wean as able

## 2021-01-30 NOTE — PROGRESS NOTES
Progress Note - Phil Horne 1960, 63 y o  male MRN: [id number]    Unit/Bed#: S -01 Encounter: [id number]    Primary Care Provider: María Torres DO   Date and time admitted to hospital: 1/17/2021  4:16 PM        * Pneumonia due to COVID-19 virus  Assessment & Plan  Patient confirmed COVID-19 positive 01/16/2021  Initially presented to [address] [place] [address],[address] with respiratory distress and altered mental status  While at [facility], was intubated due to hypercapnia and hypoxia  He became hypotensive requiring pressor therapy and was transferred to [facility] for further critical care management of severe COVID-19 infection  On severe COVID pathway  · Patient received 1st COVID-19 vaccine 1/8  · Chest CT with minimal ground-glass opacities and left lower lobe consolidation concerning for superimposed bacterial process  · Received convalescent plasma 1/17  · Continue vitamin cocktail, changed to once daily Decadron  · Was on 6 L nasal cannula until he he was found to be obtunded 1/24 with an acute respiratory acidosis and hypercapnic placed on BIPAP  · On HFNC presently, wean as able  Acute respiratory failure with hypoxia and hypercarbia [facility])  Assessment & Plan  Patient presented with acute hypoxic and hypercapnic respiratory failure requiring intubation and mechanical ventilation secondary to COVID-19 pneumonia  Patient extubated 1/18 and transferred out of ICU 1/20  Obtunded 1/24, stat ABG showed acute respiratory acidosis with hypercapnia  Patient likely also has a component of obesity hypoventilation syndrome/ANGELINA that is undiagnosed given his morbid obesity/body habitus  Presently, patient on high-flow nasal cannula oxygen, wean as able  Hematuria  Assessment & Plan  Appears resolved  Hemoglobin stable    Continue to monitor closely  · Prn bo flushes     Bacteremia  Assessment & Plan  Mixed Gram Positive Blood cultures   Admission blood cultures were drawn on the 17th at [facility] with 1 of 2 sets growing 2 colony types of coag-negative staph   Repeat blood cultures were drawn on arrival to [address] [place] [address] 1 of 2 blood cultures here are growing Enterococcus faecalis and coag-negative staph  Blood culture contaminants highly suspected with 3 different colony types of CNS and 1 of Enterococcus in same set as CNS and known difficult IV/blood draw access  · Has hx of AVR 2014, TTE negative for vegetations  · 1/19/21 blood cultures negative   · Monitoring off abx at present  · Infectious Disease input noted  · Surveillance blood cultures with Infectious Disease     Atrial fibrillation ([facility] [address] )  Assessment & Plan  Had an episode of AFib with RVR and associated hypotension with rapid response  Responded favorably to IV Lopressor  This is likely due to the fact that patient missed, his beta-blocker doses evening due to hold parameters not being met which have since been adjusted  · Per last cardiology note 10/2020 Pt was not on AC due to life threatening GI bleed  Dr Christen Nelson has spoken with Pt on multiple occasions about Watchman device pt has been declining  Depression  Assessment & Plan  Possibly multifactorial in setting of PATRICK and on IV steroids  · Psychiatrist on board  · Previously on 1: 1 observation  · Continue Zoloft  Morbid obesity   Assessment & Plan  · Recommend weight loss      Dysphagia  Assessment & Plan  Noted to be coughing with meds on 1/25  · Continue with pureed diet with nectar thick liquids per speech therapy recommendations  Acute metabolic encephalopathy  Assessment & Plan  Resolved  · Secondary to hypoxia and hypercapnia with history of stroke and residual left-sided weakness, patient initially was intubated at [facility] for both hypoxia and hypercapnia and was extubated in ICU here at [facility]  Continue to monitor follow mentation closely  Patient appears to have severe depression    Avoid sedating medications  · Melatonin q h s  Hyperlipidemia  Assessment & Plan  · Continue statin    Essential hypertension  Assessment & Plan  Monitor blood pressures  Continue antihypertensive medications  Avoid hypotension        VTE Pharmacologic Prophylaxis:   Pharmacologic: Enoxaparin (Lovenox)  Mechanical VTE Prophylaxis in Place: Yes    Patient Centered Rounds: I have performed bedside rounds with nursing staff today  Discussions with Specialists or Other Care Team Provider:     Education and Discussions with Family / Patient:  Patient, updated spouse Blease Labella    Time Spent for Care: 30 minutes  More than 50% of total time spent on counseling and coordination of care as described above  Current Length of Stay: 13 day(s)    Current Patient Status: Inpatient   Certification Statement: The patient will continue to require additional inpatient hospital stay due to as outlined    Discharge Plan: awaiting clinical and symptomatic improvement     Code Status: Level 1 - Full Code      Subjective:     Sitting up in bed  Reports feeling tired  History chart labs medications reviewed    Objective:     Vitals:   Temp (24hrs), Av 5 °F (36 9 °C), Min:98 4 °F (36 9 °C), Max:98 5 °F (36 9 °C)    Temp:  [98 4 °F (36 9 °C)-98 5 °F (36 9 °C)] 98 4 °F (36 9 °C)  HR:  [72-80] 74  Resp:  [20] 20  BP: (108-120)/(56-66) 112/63  SpO2:  [86 %-94 %] 92 %  Body mass index is 56 64 kg/m²  Input and Output Summary (last 24 hours):        Intake/Output Summary (Last 24 hours) at 2021 1503  Last data filed at 2021 1004  Gross per 24 hour   Intake 600 ml   Output 1750 ml   Net -1150 ml       Physical Exam:     Physical Exam    Sitting up in bed  Dyspneic at rest   Obese  Short thick neck  Lungs diminished breath sounds bilaterally  Heart sounds S1-S2 noted  Abdomen soft nontender  Awake obeys simple commands  Pulses noted  No rash      Additional Data:     Labs:    Results from last 7 days   Lab Units 21  0629   WBC Thousand/uL 6 58   HEMOGLOBIN g/dL 11 1*   HEMATOCRIT % 39 3   PLATELETS Thousands/uL 195   NEUTROS PCT % 91*   LYMPHS PCT % 4*   MONOS PCT % 3*   EOS PCT % 0     Results from last 7 days   Lab Units 01/30/21  0629   SODIUM mmol/L 141   POTASSIUM mmol/L 4 2   CHLORIDE mmol/L 104   CO2 mmol/L 31   BUN mg/dL 57*   CREATININE mg/dL 2 12*   ANION GAP mmol/L 6   CALCIUM mg/dL 8 0*   ALBUMIN g/dL 2 3*   TOTAL BILIRUBIN mg/dL 0 38   ALK PHOS U/L 58   ALT U/L 27   AST U/L 21   GLUCOSE RANDOM mg/dL 128         Results from last 7 days   Lab Units 01/30/21  1058 01/30/21  0736 01/30/21  0028 01/29/21  1813 01/29/21  1153 01/29/21  0111 01/28/21  2056 01/28/21  1516 01/28/21  1214 01/28/21  0813 01/27/21  2109 01/27/21  1531   POC GLUCOSE mg/dl 168* 138 146* 184* 124 119 99 99 104 129 144* 197*                   * I Have Reviewed All Lab Data Listed Above  * Additional Pertinent Lab Tests Reviewed:  All Labs Within Last 24 Hours Reviewed    Imaging:    Imaging Reports Reviewed Today Include:  Imaging studies noted  Imaging Personally Reviewed by Myself Includes:     Recent Cultures (last 7 days):           Last 24 Hours Medication List:   Current Facility-Administered Medications   Medication Dose Route Frequency Provider Last Rate    acetaminophen  650 mg Oral Q6H PRN Yareli Haynes PA-C      aspirin  81 mg Oral Daily Yareli Haynes PA-C      atorvastatin  40 mg Oral HS KATHY Hathaway      bisacodyl  10 mg Rectal Daily PRN Yareli Haynes PA-C      bumetanide  2 mg Oral BID Peter Frank DO      chlorhexidine  15 mL Swish & Spit Q12H [facility] Yareli Haynes PA-C      cholecalciferol  2,000 Units Per NG Tube Daily Yareli Haynes PA-C      dexamethasone  6 mg Intravenous Daily Lionel Robbins PA-C      diltiazem  90 mg Oral Q12H [facility] Santa Yee MD      docusate sodium  100 mg Oral BID KATHY Austin      enoxaparin  40 mg Subcutaneous Q24H [facility] Lionel Robbins PA-C      melatonin  3 mg Oral HS PRN KATHY Gage      methocarbamol  500 mg Oral Q8H PRN KATHY Gage      metoprolol  5 mg Intravenous Q6H PRN Sonny Dias MD      metoprolol tartrate  50 mg Oral TID Nicole Joshua PA-C      multivitamin with iron-minerals  15 mL Per NG Tube Daily Yareli Haynes PA-C      pantoprazole  40 mg Oral Early Morning Yareli Haynes PA-C      polyethylene glycol  17 g Oral BID KATHY Gage      senna-docusate sodium  1 tablet Oral HS Maren MadrigalHERBERT peraza      sertraline  125 mg Oral Daily Ursula Reese MD      sodium chloride  1,000 mL Intravenous Once Blas Spear PA-C      traMADol  50 mg Oral Once KATHY Gage          Today, Patient Was Seen By: Zoila Elizalde MD    ** Please Note: Dictation voice to text software may have been used in the creation of this document   **

## 2021-01-31 LAB
ANION GAP SERPL CALCULATED.3IONS-SCNC: 6 MMOL/L (ref 4–13)
BUN SERPL-MCNC: 58 MG/DL (ref 5–25)
CALCIUM SERPL-MCNC: 7.9 MG/DL (ref 8.3–10.1)
CHLORIDE SERPL-SCNC: 105 MMOL/L (ref 100–108)
CO2 SERPL-SCNC: 32 MMOL/L (ref 21–32)
CREAT SERPL-MCNC: 2.1 MG/DL (ref 0.6–1.3)
GFR SERPL CREATININE-BSD FRML MDRD: 33 ML/MIN/1.73SQ M
GLUCOSE SERPL-MCNC: 102 MG/DL (ref 65–140)
GLUCOSE SERPL-MCNC: 111 MG/DL (ref 65–140)
GLUCOSE SERPL-MCNC: 124 MG/DL (ref 65–140)
GLUCOSE SERPL-MCNC: 149 MG/DL (ref 65–140)
GLUCOSE SERPL-MCNC: 185 MG/DL (ref 65–140)
GLUCOSE SERPL-MCNC: 92 MG/DL (ref 65–140)
POTASSIUM SERPL-SCNC: 4.1 MMOL/L (ref 3.5–5.3)
SODIUM SERPL-SCNC: 143 MMOL/L (ref 136–145)

## 2021-01-31 PROCEDURE — 94762 N-INVAS EAR/PLS OXIMTRY CONT: CPT

## 2021-01-31 PROCEDURE — 80048 BASIC METABOLIC PNL TOTAL CA: CPT | Performed by: INTERNAL MEDICINE

## 2021-01-31 PROCEDURE — 99232 SBSQ HOSP IP/OBS MODERATE 35: CPT | Performed by: INTERNAL MEDICINE

## 2021-01-31 PROCEDURE — 82948 REAGENT STRIP/BLOOD GLUCOSE: CPT

## 2021-01-31 PROCEDURE — 94760 N-INVAS EAR/PLS OXIMETRY 1: CPT

## 2021-01-31 RX ORDER — ALBUMIN (HUMAN) 12.5 G/50ML
25 SOLUTION INTRAVENOUS 2 TIMES DAILY
Status: COMPLETED | OUTPATIENT
Start: 2021-01-31 | End: 2021-02-01

## 2021-01-31 RX ADMIN — Medication 2000 UNITS: at 10:06

## 2021-01-31 RX ADMIN — METOPROLOL TARTRATE 50 MG: 50 TABLET, FILM COATED ORAL at 21:52

## 2021-01-31 RX ADMIN — DOCUSATE SODIUM AND SENNOSIDES 1 TABLET: 8.6; 5 TABLET ORAL at 21:53

## 2021-01-31 RX ADMIN — POLYETHYLENE GLYCOL 3350 17 G: 17 POWDER, FOR SOLUTION ORAL at 21:53

## 2021-01-31 RX ADMIN — MULTIVITAMIN 15 ML: LIQUID ORAL at 10:07

## 2021-01-31 RX ADMIN — ASPIRIN 81 MG: 81 TABLET ORAL at 10:06

## 2021-01-31 RX ADMIN — DILTIAZEM HYDROCHLORIDE 90 MG: 90 CAPSULE, EXTENDED RELEASE ORAL at 21:56

## 2021-01-31 RX ADMIN — METOPROLOL TARTRATE 50 MG: 50 TABLET, FILM COATED ORAL at 10:03

## 2021-01-31 RX ADMIN — ALBUMIN (HUMAN) 25 G: 0.25 INJECTION, SOLUTION INTRAVENOUS at 10:02

## 2021-01-31 RX ADMIN — BUMETANIDE 2 MG: 1 TABLET ORAL at 10:07

## 2021-01-31 RX ADMIN — ATORVASTATIN CALCIUM 40 MG: 40 TABLET, FILM COATED ORAL at 21:53

## 2021-01-31 RX ADMIN — SERTRALINE 125 MG: 25 TABLET, FILM COATED ORAL at 10:06

## 2021-01-31 RX ADMIN — CHLORHEXIDINE GLUCONATE 0.12% ORAL RINSE 15 ML: 1.2 LIQUID ORAL at 21:53

## 2021-01-31 RX ADMIN — POLYETHYLENE GLYCOL 3350 17 G: 17 POWDER, FOR SOLUTION ORAL at 10:08

## 2021-01-31 RX ADMIN — CHLORHEXIDINE GLUCONATE 0.12% ORAL RINSE 15 ML: 1.2 LIQUID ORAL at 10:07

## 2021-01-31 RX ADMIN — ALBUMIN (HUMAN) 25 G: 0.25 INJECTION, SOLUTION INTRAVENOUS at 22:44

## 2021-01-31 RX ADMIN — ENOXAPARIN SODIUM 40 MG: 40 INJECTION SUBCUTANEOUS at 10:07

## 2021-01-31 RX ADMIN — METOPROLOL TARTRATE 50 MG: 50 TABLET, FILM COATED ORAL at 17:40

## 2021-01-31 RX ADMIN — PANTOPRAZOLE SODIUM 40 MG: 40 TABLET, DELAYED RELEASE ORAL at 05:24

## 2021-01-31 RX ADMIN — DOCUSATE SODIUM 100 MG: 100 CAPSULE, LIQUID FILLED ORAL at 10:07

## 2021-01-31 RX ADMIN — DEXAMETHASONE SODIUM PHOSPHATE 6 MG: 4 INJECTION INTRA-ARTICULAR; INTRALESIONAL; INTRAMUSCULAR; INTRAVENOUS; SOFT TISSUE at 10:08

## 2021-01-31 RX ADMIN — DOCUSATE SODIUM 100 MG: 100 CAPSULE, LIQUID FILLED ORAL at 17:40

## 2021-01-31 RX ADMIN — DILTIAZEM HYDROCHLORIDE 90 MG: 90 CAPSULE, EXTENDED RELEASE ORAL at 10:08

## 2021-01-31 NOTE — ASSESSMENT & PLAN NOTE
Resolved  · Secondary to hypoxia and hypercapnia with history of stroke and residual left-sided weakness, patient initially was intubated at [facility] for both hypoxia and hypercapnia and was extubated in ICU here at [facility]  Continue to monitor follow mentation closely  Patient appears to have severe depression  Avoid sedating medications  · Melatonin q h s

## 2021-01-31 NOTE — PROGRESS NOTES
Progress Note - Timmy Mon 1960, 63 y o  male MRN: [id number]    Unit/Bed#: S -01 Encounter: [id number]    Primary Care Provider: Fidencio Gonzalez DO   Date and time admitted to hospital: 1/17/2021  4:16 PM        * Pneumonia due to COVID-19 virus  Assessment & Plan  Patient confirmed COVID-19 positive 01/16/2021  Initially presented to [address] [place] [address],[address] with respiratory distress and altered mental status  While at [facility], was intubated due to hypercapnia and hypoxia  He became hypotensive requiring pressor therapy and was [facility] for further critical care management of severe COVID-19 infection  On severe COVID pathway  · Patient received 1st COVID-19 vaccine 1/8  · Chest CT with minimal ground-glass opacities and left lower lobe consolidation concerning for superimposed bacterial process  · Received convalescent plasma 1/17  · Continue vitamin cocktail, changed to once daily Decadron  · Was on 6 L nasal cannula until he he was found to be obtunded 1/24 with an acute respiratory acidosis and hypercapnic placed on BIPAP  · On HFNC presently, wean as able  Acute respiratory failure with hypoxia and hypercarbia [facility])  Assessment & Plan  Patient presented with acute hypoxic and hypercapnic respiratory failure requiring intubation and mechanical ventilation secondary to COVID-19 pneumonia  Patient extubated 1/18 and transferred out of ICU 1/20  Obtunded 1/24, stat ABG showed acute respiratory acidosis with hypercapnia  Patient likely also has a component of obesity hypoventilation syndrome/ANGELINA that is undiagnosed given his morbid obesity/body habitus  Presently, patient on high-flow nasal cannula oxygen, wean as able  Hematuria  Assessment & Plan  Appears resolved  Hemoglobin stable    Continue to monitor closely  · Prn bo flushes     Bacteremia  Assessment & Plan  Mixed Gram Positive Blood cultures   Admission blood cultures were drawn on the 17th at [facility] with 1 of 2 sets growing 2 colony types of coag-negative staph   Repeat blood cultures were drawn on arrival to [address] [place] [address] 1 of 2 blood cultures here are growing Enterococcus faecalis and coag-negative staph  Blood culture contaminants highly suspected with 3 different colony types of CNS and 1 of Enterococcus in same set as CNS and known difficult IV/blood draw access  · Has hx of AVR 2014, TTE negative for vegetations  · 1/19/21 blood cultures negative   · Monitoring off abx at present  · Infectious Disease input noted  · Surveillance blood cultures with Infectious Disease     Atrial fibrillation ([facility] [address] )  Assessment & Plan  Had an episode of AFib with RVR and associated hypotension with rapid response  Responded favorably to IV Lopressor  This is likely due to the fact that patient missed, his beta-blocker doses evening due to hold parameters not being met which have since been adjusted  · Per last cardiology note 10/2020 Pt was not on AC due to life threatening GI bleed  Dr Miki Palacios has spoken with Pt on multiple occasions about Watchman device pt has been declining  Depression  Assessment & Plan  Continue Zoloft  Morbid obesity   Assessment & Plan  · Recommend weight loss      Dysphagia  Assessment & Plan  Noted to be coughing with meds on 1/25  · Continue with pureed diet with nectar thick liquids per speech therapy recommendations  Acute metabolic encephalopathy  Assessment & Plan  Resolved  · Secondary to hypoxia and hypercapnia with history of stroke and residual left-sided weakness, patient initially was intubated at [facility] for both hypoxia and hypercapnia and was extubated in ICU here at [facility]  Continue to monitor follow mentation closely  Patient appears to have severe depression  Avoid sedating medications  · Melatonin q h s        Hyperlipidemia  Assessment & Plan  · Continue statin    Essential hypertension  Assessment & Plan  Monitor blood pressures  Continue antihypertensive medications  Avoid hypotension        VTE Pharmacologic Prophylaxis:   Pharmacologic: Enoxaparin (Lovenox)  Mechanical VTE Prophylaxis in Place: Yes    Patient Centered Rounds: I have performed bedside rounds with nursing staff today  Discussions with Specialists or Other Care Team Provider:     Education and Discussions with Family / Patient:  Patient, updated spouse Kelsi Owen    Time Spent for Care: 30 minutes  More than 50% of total time spent on counseling and coordination of care as described above  Current Length of Stay: 14 day(s)    Current Patient Status: Inpatient   Certification Statement: The patient will continue to require additional inpatient hospital stay due to as outlined    Discharge Plan:  Awaiting clinical and symptomatic improvement    Code Status: Level 1 - Full Code      Subjective:     Comfortably sitting up in bed  Reports feeling okay  Encourage incentive spirometry    Objective:     Vitals:   Temp (24hrs), Av 8 °F (37 1 °C), Min:97 9 °F (36 6 °C), Max:99 7 °F (37 6 °C)    Temp:  [97 9 °F (36 6 °C)-99 7 °F (37 6 °C)] 98 7 °F (37 1 °C)  HR:  [60-79] 75  Resp:  [20] 20  BP: (104-131)/(56-71) 121/67  SpO2:  [90 %-94 %] 93 %  Body mass index is 56 64 kg/m²  Input and Output Summary (last 24 hours):        Intake/Output Summary (Last 24 hours) at 2021 1242  Last data filed at 2021 1002  Gross per 24 hour   Intake 720 ml   Output 650 ml   Net 70 ml       Physical Exam:     Physical Exam    Comfortably sitting up in bed  Obese  Short thick neck  Lungs diminished breath sounds bilateral  Heart sounds S1-S2 noted  Abdomen soft nontender  Awake obeys simple commands  Moves extremities  Pulses noted  No rash    Additional Data:     Labs:    Results from last 7 days   Lab Units 21  0629   WBC Thousand/uL 6 58   HEMOGLOBIN g/dL 11 1*   HEMATOCRIT % 39 3   PLATELETS Thousands/uL 195   NEUTROS PCT % 91*   LYMPHS PCT % 4*   MONOS PCT % 3*   EOS PCT % 0     Results from last 7 days   Lab Units 01/31/21  0525 01/30/21  0629   SODIUM mmol/L 143 141   POTASSIUM mmol/L 4 1 4 2   CHLORIDE mmol/L 105 104   CO2 mmol/L 32 31   BUN mg/dL 58* 57*   CREATININE mg/dL 2 10* 2 12*   ANION GAP mmol/L 6 6   CALCIUM mg/dL 7 9* 8 0*   ALBUMIN g/dL  --  2 3*   TOTAL BILIRUBIN mg/dL  --  0 38   ALK PHOS U/L  --  58   ALT U/L  --  27   AST U/L  --  21   GLUCOSE RANDOM mg/dL 124 128         Results from last 7 days   Lab Units 01/31/21  1043 01/31/21  0731 01/31/21  0128 01/30/21  2114 01/30/21  1531 01/30/21  1058 01/30/21  0736 01/30/21  0028 01/29/21  1813 01/29/21  1153 01/29/21  0111 01/28/21  2056   POC GLUCOSE mg/dl 92 102 149* 162* 133 168* 138 146* 184* 124 119 99                   * I Have Reviewed All Lab Data Listed Above  * Additional Pertinent Lab Tests Reviewed:  All Labs Within Last 24 Hours Reviewed    Imaging:    Imaging Reports Reviewed Today Include:   Imaging Personally Reviewed by Myself Includes:     Recent Cultures (last 7 days):           Last 24 Hours Medication List:   Current Facility-Administered Medications   Medication Dose Route Frequency Provider Last Rate    acetaminophen  650 mg Oral Q6H PRN Yareli Haynes PA-C      albumin human  25 g Intravenous BID Deisi Vences, DO 25 g (01/31/21 1002)    aspirin  81 mg Oral Daily Yareli Haynes PA-C      atorvastatin  40 mg Oral HS KATHY Hathaway      bisacodyl  10 mg Rectal Daily PRN Yareli Haynes PA-C      bumetanide  2 mg Oral BID Nadsavage Lasso, DO      chlorhexidine  15 mL Swish & Spit Q12H [address] Yareli Haynes PA-C      cholecalciferol  2,000 Units Per NG Tube Daily Yareli Haynes PA-C      dexamethasone  6 mg Intravenous Daily Lionel Robbins PA-C      diltiazem  90 mg Oral Q12H [address] Freya Cook MD      docusate sodium  100 mg Oral BID KATYH Jimenez      enoxaparin  40 mg Subcutaneous Q24H [address] Lionel Robbins PA-C      melatonin  3 mg Oral HS PRN Colby Johnson, KATHY      methocarbamol  500 mg Oral Q8H PRN KATHY Ann      metoprolol  5 mg Intravenous Q6H PRN Esteban Hinds MD      metoprolol tartrate  50 mg Oral TID Gwen Paul PA-C      multivitamin with iron-minerals  15 mL Per NG Tube Daily Yareli Haynes PA-C      pantoprazole  40 mg Oral Early Morning Yareli Haynes PA-C      polyethylene glycol  17 g Oral BID KATHY Ann      senna-docusate sodium  1 tablet Oral HS Rajwinder Real PA-C      sertraline  125 mg Oral Daily Bob Acosta MD      sodium chloride  1,000 mL Intravenous Once Blas Spear PA-C      traMADol  50 mg Oral Once KATHY Ann          Today, Patient Was Seen By: Gualberto Linn MD    ** Please Note: Dictation voice to text software may have been used in the creation of this document   **

## 2021-01-31 NOTE — ASSESSMENT & PLAN NOTE
Mixed Gram Positive Blood cultures   Admission blood cultures were drawn on the 17th at [facility] with 1 of 2 sets growing 2 colony types of coag-negative staph   Repeat blood cultures were drawn on arrival to [address] [place] [address] 1 of 2 blood cultures here are growing Enterococcus faecalis and coag-negative staph  Blood culture contaminants highly suspected with 3 different colony types of CNS and 1 of Enterococcus in same set as CNS and known difficult IV/blood draw access  · Has hx of AVR 2014, TTE negative for vegetations  · 1/19/21 blood cultures negative   · Monitoring off abx at present  · Infectious Disease input noted  · Surveillance blood cultures with Infectious Disease

## 2021-01-31 NOTE — ASSESSMENT & PLAN NOTE
Patient confirmed COVID-19 positive 01/16/2021  Initially presented to [facility],[address] with respiratory distress and altered mental status  While at [facility], was intubated due to hypercapnia and hypoxia  He became hypotensive requiring pressor therapy and was transferred to [facility] for further critical care management of severe COVID-19 infection  On severe COVID pathway  · Patient received 1st COVID-19 vaccine 1/8  · Chest CT with minimal ground-glass opacities and left lower lobe consolidation concerning for superimposed bacterial process  · Received convalescent plasma 1/17  · Continue vitamin cocktail, changed to once daily Decadron  · Was on 6 L nasal cannula until he he was found to be obtunded 1/24 with an acute respiratory acidosis and hypercapnic placed on BIPAP  · On HFNC presently, wean as able

## 2021-02-01 ENCOUNTER — APPOINTMENT (INPATIENT)
Dept: RADIOLOGY | Facility: HOSPITAL | Age: 61
DRG: 130 | End: 2021-02-01
Payer: COMMERCIAL

## 2021-02-01 ENCOUNTER — APPOINTMENT (INPATIENT)
Dept: CT IMAGING | Facility: HOSPITAL | Age: 61
DRG: 130 | End: 2021-02-01
Payer: COMMERCIAL

## 2021-02-01 PROBLEM — R56.9 SEIZURE (HCC): Status: ACTIVE | Noted: 2021-02-01

## 2021-02-01 LAB
ALBUMIN SERPL BCP-MCNC: 3.3 G/DL (ref 3.5–5)
ALP SERPL-CCNC: 60 U/L (ref 46–116)
ALT SERPL W P-5'-P-CCNC: 23 U/L (ref 12–78)
ANION GAP SERPL CALCULATED.3IONS-SCNC: 4 MMOL/L (ref 4–13)
ANION GAP SERPL CALCULATED.3IONS-SCNC: 6 MMOL/L (ref 4–13)
ANISOCYTOSIS BLD QL SMEAR: PRESENT
AST SERPL W P-5'-P-CCNC: 14 U/L (ref 5–45)
BACTERIA UR QL AUTO: NORMAL /HPF
BASO STIPL BLD QL SMEAR: PRESENT
BASOPHILS # BLD MANUAL: 0 THOUSAND/UL (ref 0–0.1)
BASOPHILS NFR MAR MANUAL: 0 % (ref 0–1)
BILIRUB SERPL-MCNC: 0.64 MG/DL (ref 0.2–1)
BILIRUB UR QL STRIP: NEGATIVE
BUN SERPL-MCNC: 64 MG/DL (ref 5–25)
BUN SERPL-MCNC: 68 MG/DL (ref 5–25)
CALCIUM ALBUM COR SERPL-MCNC: 9.1 MG/DL (ref 8.3–10.1)
CALCIUM SERPL-MCNC: 8.3 MG/DL (ref 8.3–10.1)
CALCIUM SERPL-MCNC: 8.5 MG/DL (ref 8.3–10.1)
CHLORIDE SERPL-SCNC: 105 MMOL/L (ref 100–108)
CHLORIDE SERPL-SCNC: 105 MMOL/L (ref 100–108)
CLARITY UR: CLEAR
CO2 SERPL-SCNC: 34 MMOL/L (ref 21–32)
CO2 SERPL-SCNC: 36 MMOL/L (ref 21–32)
COLOR UR: YELLOW
CREAT SERPL-MCNC: 1.94 MG/DL (ref 0.6–1.3)
CREAT SERPL-MCNC: 2.05 MG/DL (ref 0.6–1.3)
CRP SERPL QL: 10.6 MG/L
D DIMER PPP FEU-MCNC: 19.4 UG/ML FEU
EOSINOPHIL # BLD MANUAL: 0 THOUSAND/UL (ref 0–0.4)
EOSINOPHIL NFR BLD MANUAL: 0 % (ref 0–6)
ERYTHROCYTE [DISTWIDTH] IN BLOOD BY AUTOMATED COUNT: 18.6 % (ref 11.6–15.1)
FERRITIN SERPL-MCNC: 346 NG/ML (ref 8–388)
GFR SERPL CREATININE-BSD FRML MDRD: 34 ML/MIN/1.73SQ M
GFR SERPL CREATININE-BSD FRML MDRD: 36 ML/MIN/1.73SQ M
GLUCOSE SERPL-MCNC: 104 MG/DL (ref 65–140)
GLUCOSE SERPL-MCNC: 108 MG/DL (ref 65–140)
GLUCOSE SERPL-MCNC: 129 MG/DL (ref 65–140)
GLUCOSE UR STRIP-MCNC: NEGATIVE MG/DL
HCT VFR BLD AUTO: 41.3 % (ref 36.5–49.3)
HGB BLD-MCNC: 11.9 G/DL (ref 12–17)
HGB UR QL STRIP.AUTO: ABNORMAL
HYPERCHROMIA BLD QL SMEAR: PRESENT
KETONES UR STRIP-MCNC: NEGATIVE MG/DL
LEUKOCYTE ESTERASE UR QL STRIP: NEGATIVE
LYMPHOCYTES # BLD AUTO: 0.48 THOUSAND/UL (ref 0.6–4.47)
LYMPHOCYTES # BLD AUTO: 4 % (ref 14–44)
MCH RBC QN AUTO: 23.1 PG (ref 26.8–34.3)
MCHC RBC AUTO-ENTMCNC: 28.8 G/DL (ref 31.4–37.4)
MCV RBC AUTO: 80 FL (ref 82–98)
MONOCYTES # BLD AUTO: 0.24 THOUSAND/UL (ref 0–1.22)
MONOCYTES NFR BLD: 2 % (ref 4–12)
NEUTROPHILS # BLD MANUAL: 11.35 THOUSAND/UL (ref 1.85–7.62)
NEUTS SEG NFR BLD AUTO: 94 % (ref 43–75)
NITRITE UR QL STRIP: NEGATIVE
NON-SQ EPI CELLS URNS QL MICRO: NORMAL /HPF
NRBC BLD AUTO-RTO: 0 /100 WBCS
NT-PROBNP SERPL-MCNC: 2293 PG/ML
PH UR STRIP.AUTO: 5.5 [PH]
PLATELET # BLD AUTO: 222 THOUSANDS/UL (ref 149–390)
PLATELET BLD QL SMEAR: ADEQUATE
PMV BLD AUTO: 10.9 FL (ref 8.9–12.7)
POTASSIUM SERPL-SCNC: 4.2 MMOL/L (ref 3.5–5.3)
POTASSIUM SERPL-SCNC: 4.3 MMOL/L (ref 3.5–5.3)
PROCALCITONIN SERPL-MCNC: 0.13 NG/ML
PROT SERPL-MCNC: 6.5 G/DL (ref 6.4–8.2)
PROT UR STRIP-MCNC: NEGATIVE MG/DL
RBC # BLD AUTO: 5.15 MILLION/UL (ref 3.88–5.62)
RBC #/AREA URNS AUTO: NORMAL /HPF
SODIUM SERPL-SCNC: 145 MMOL/L (ref 136–145)
SODIUM SERPL-SCNC: 145 MMOL/L (ref 136–145)
SP GR UR STRIP.AUTO: 1.01 (ref 1–1.03)
TOTAL CELLS COUNTED SPEC: 100
TROPONIN I SERPL-MCNC: 0.03 NG/ML
UROBILINOGEN UR QL STRIP.AUTO: 0.2 E.U./DL
WBC # BLD AUTO: 12.07 THOUSAND/UL (ref 4.31–10.16)
WBC #/AREA URNS AUTO: NORMAL /HPF

## 2021-02-01 PROCEDURE — 99291 CRITICAL CARE FIRST HOUR: CPT | Performed by: PHYSICIAN ASSISTANT

## 2021-02-01 PROCEDURE — 82948 REAGENT STRIP/BLOOD GLUCOSE: CPT

## 2021-02-01 PROCEDURE — 85379 FIBRIN DEGRADATION QUANT: CPT | Performed by: PHYSICIAN ASSISTANT

## 2021-02-01 PROCEDURE — 80053 COMPREHEN METABOLIC PANEL: CPT | Performed by: PHYSICIAN ASSISTANT

## 2021-02-01 PROCEDURE — 31500 INSERT EMERGENCY AIRWAY: CPT | Performed by: PHYSICIAN ASSISTANT

## 2021-02-01 PROCEDURE — 94150 VITAL CAPACITY TEST: CPT

## 2021-02-01 PROCEDURE — 85027 COMPLETE CBC AUTOMATED: CPT | Performed by: PHYSICIAN ASSISTANT

## 2021-02-01 PROCEDURE — 94760 N-INVAS EAR/PLS OXIMETRY 1: CPT

## 2021-02-01 PROCEDURE — 84295 ASSAY OF SERUM SODIUM: CPT

## 2021-02-01 PROCEDURE — 36600 WITHDRAWAL OF ARTERIAL BLOOD: CPT

## 2021-02-01 PROCEDURE — 93005 ELECTROCARDIOGRAM TRACING: CPT

## 2021-02-01 PROCEDURE — 5A1955Z RESPIRATORY VENTILATION, GREATER THAN 96 CONSECUTIVE HOURS: ICD-10-PCS | Performed by: INTERNAL MEDICINE

## 2021-02-01 PROCEDURE — 99233 SBSQ HOSP IP/OBS HIGH 50: CPT | Performed by: INTERNAL MEDICINE

## 2021-02-01 PROCEDURE — 85014 HEMATOCRIT: CPT

## 2021-02-01 PROCEDURE — 99292 CRITICAL CARE ADDL 30 MIN: CPT | Performed by: PHYSICIAN ASSISTANT

## 2021-02-01 PROCEDURE — 71045 X-RAY EXAM CHEST 1 VIEW: CPT

## 2021-02-01 PROCEDURE — 87040 BLOOD CULTURE FOR BACTERIA: CPT | Performed by: PHYSICIAN ASSISTANT

## 2021-02-01 PROCEDURE — 84132 ASSAY OF SERUM POTASSIUM: CPT

## 2021-02-01 PROCEDURE — 94762 N-INVAS EAR/PLS OXIMTRY CONT: CPT

## 2021-02-01 PROCEDURE — NC001 PR NO CHARGE: Performed by: PHYSICIAN ASSISTANT

## 2021-02-01 PROCEDURE — 81001 URINALYSIS AUTO W/SCOPE: CPT | Performed by: PHYSICIAN ASSISTANT

## 2021-02-01 PROCEDURE — 84484 ASSAY OF TROPONIN QUANT: CPT | Performed by: PHYSICIAN ASSISTANT

## 2021-02-01 PROCEDURE — 86140 C-REACTIVE PROTEIN: CPT | Performed by: PHYSICIAN ASSISTANT

## 2021-02-01 PROCEDURE — 85007 BL SMEAR W/DIFF WBC COUNT: CPT | Performed by: PHYSICIAN ASSISTANT

## 2021-02-01 PROCEDURE — 99292 CRITICAL CARE ADDL 30 MIN: CPT | Performed by: INTERNAL MEDICINE

## 2021-02-01 PROCEDURE — 82947 ASSAY GLUCOSE BLOOD QUANT: CPT

## 2021-02-01 PROCEDURE — 84145 PROCALCITONIN (PCT): CPT | Performed by: PHYSICIAN ASSISTANT

## 2021-02-01 PROCEDURE — 94002 VENT MGMT INPAT INIT DAY: CPT

## 2021-02-01 PROCEDURE — 82728 ASSAY OF FERRITIN: CPT | Performed by: PHYSICIAN ASSISTANT

## 2021-02-01 PROCEDURE — 83880 ASSAY OF NATRIURETIC PEPTIDE: CPT | Performed by: PHYSICIAN ASSISTANT

## 2021-02-01 PROCEDURE — 82803 BLOOD GASES ANY COMBINATION: CPT

## 2021-02-01 PROCEDURE — 0BH18EZ INSERTION OF ENDOTRACHEAL AIRWAY INTO TRACHEA, VIA NATURAL OR ARTIFICIAL OPENING ENDOSCOPIC: ICD-10-PCS | Performed by: INTERNAL MEDICINE

## 2021-02-01 PROCEDURE — 80048 BASIC METABOLIC PNL TOTAL CA: CPT | Performed by: INTERNAL MEDICINE

## 2021-02-01 PROCEDURE — 70450 CT HEAD/BRAIN W/O DYE: CPT

## 2021-02-01 PROCEDURE — G1004 CDSM NDSC: HCPCS

## 2021-02-01 RX ORDER — FUROSEMIDE 10 MG/ML
60 INJECTION INTRAMUSCULAR; INTRAVENOUS ONCE
Status: COMPLETED | OUTPATIENT
Start: 2021-02-01 | End: 2021-02-01

## 2021-02-01 RX ORDER — LORAZEPAM 2 MG/ML
INJECTION INTRAMUSCULAR
Status: DISPENSED
Start: 2021-02-01 | End: 2021-02-02

## 2021-02-01 RX ORDER — SUCCINYLCHOLINE/SOD CL,ISO/PF 100 MG/5ML
100 SYRINGE (ML) INTRAVENOUS ONCE
Status: COMPLETED | OUTPATIENT
Start: 2021-02-01 | End: 2021-02-01

## 2021-02-01 RX ORDER — LORAZEPAM 2 MG/ML
2 INJECTION INTRAMUSCULAR ONCE
Status: COMPLETED | OUTPATIENT
Start: 2021-02-01 | End: 2021-02-01

## 2021-02-01 RX ORDER — VANCOMYCIN HYDROCHLORIDE 500 MG/100ML
500 INJECTION, SOLUTION INTRAVENOUS ONCE
Status: COMPLETED | OUTPATIENT
Start: 2021-02-01 | End: 2021-02-02

## 2021-02-01 RX ORDER — PROPOFOL 10 MG/ML
5-50 INJECTION, EMULSION INTRAVENOUS
Status: DISCONTINUED | OUTPATIENT
Start: 2021-02-01 | End: 2021-02-08

## 2021-02-01 RX ORDER — DEXAMETHASONE SODIUM PHOSPHATE 4 MG/ML
6 INJECTION, SOLUTION INTRA-ARTICULAR; INTRALESIONAL; INTRAMUSCULAR; INTRAVENOUS; SOFT TISSUE DAILY
Status: DISCONTINUED | OUTPATIENT
Start: 2021-02-02 | End: 2021-02-04

## 2021-02-01 RX ORDER — CHLORHEXIDINE GLUCONATE 0.12 MG/ML
15 RINSE ORAL EVERY 12 HOURS SCHEDULED
Status: DISCONTINUED | OUTPATIENT
Start: 2021-02-01 | End: 2021-02-16

## 2021-02-01 RX ORDER — ETOMIDATE 2 MG/ML
10 INJECTION INTRAVENOUS ONCE
Status: COMPLETED | OUTPATIENT
Start: 2021-02-01 | End: 2021-02-01

## 2021-02-01 RX ORDER — VECURONIUM BROMIDE 1 MG/ML
10 INJECTION, POWDER, LYOPHILIZED, FOR SOLUTION INTRAVENOUS ONCE
Status: COMPLETED | OUTPATIENT
Start: 2021-02-01 | End: 2021-02-01

## 2021-02-01 RX ORDER — ATORVASTATIN CALCIUM 40 MG/1
40 TABLET, FILM COATED ORAL
Status: DISCONTINUED | OUTPATIENT
Start: 2021-02-01 | End: 2021-02-02

## 2021-02-01 RX ADMIN — DEXAMETHASONE SODIUM PHOSPHATE 6 MG: 4 INJECTION INTRA-ARTICULAR; INTRALESIONAL; INTRAMUSCULAR; INTRAVENOUS; SOFT TISSUE at 09:05

## 2021-02-01 RX ADMIN — LEVETIRACETAM 2000 MG: 100 INJECTION, SOLUTION INTRAVENOUS at 23:36

## 2021-02-01 RX ADMIN — CHLORHEXIDINE GLUCONATE 0.12% ORAL RINSE 15 ML: 1.2 LIQUID ORAL at 23:13

## 2021-02-01 RX ADMIN — CEFEPIME HYDROCHLORIDE 2000 MG: 2 INJECTION, POWDER, FOR SOLUTION INTRAVENOUS at 12:14

## 2021-02-01 RX ADMIN — ATORVASTATIN CALCIUM 40 MG: 40 TABLET, FILM COATED ORAL at 23:13

## 2021-02-01 RX ADMIN — ENOXAPARIN SODIUM 60 MG: 60 INJECTION SUBCUTANEOUS at 12:40

## 2021-02-01 RX ADMIN — ETOMIDATE 10 MG: 2 INJECTION, SOLUTION INTRAVENOUS at 18:39

## 2021-02-01 RX ADMIN — PANTOPRAZOLE SODIUM 40 MG: 40 TABLET, DELAYED RELEASE ORAL at 05:37

## 2021-02-01 RX ADMIN — VANCOMYCIN HYDROCHLORIDE 500 MG: 500 INJECTION, SOLUTION INTRAVENOUS at 12:45

## 2021-02-01 RX ADMIN — CEFEPIME HYDROCHLORIDE 2000 MG: 2 INJECTION, POWDER, FOR SOLUTION INTRAVENOUS at 22:25

## 2021-02-01 RX ADMIN — FAMOTIDINE 20 MG: 10 INJECTION INTRAVENOUS at 23:13

## 2021-02-01 RX ADMIN — ENOXAPARIN SODIUM 40 MG: 40 INJECTION SUBCUTANEOUS at 09:04

## 2021-02-01 RX ADMIN — LORAZEPAM 2 MG: 2 INJECTION INTRAMUSCULAR; INTRAVENOUS at 18:30

## 2021-02-01 RX ADMIN — PROPOFOL 15 MCG/KG/MIN: 10 INJECTION, EMULSION INTRAVENOUS at 18:50

## 2021-02-01 RX ADMIN — FUROSEMIDE 60 MG: 10 INJECTION, SOLUTION INTRAMUSCULAR; INTRAVENOUS at 09:04

## 2021-02-01 RX ADMIN — METRONIDAZOLE 500 MG: 500 INJECTION, SOLUTION INTRAVENOUS at 12:40

## 2021-02-01 RX ADMIN — DOCUSATE SODIUM AND SENNOSIDES 1 TABLET: 8.6; 5 TABLET ORAL at 23:13

## 2021-02-01 RX ADMIN — LORAZEPAM 2 MG: 2 INJECTION INTRAMUSCULAR; INTRAVENOUS at 20:17

## 2021-02-01 RX ADMIN — ENOXAPARIN SODIUM 60 MG: 60 INJECTION SUBCUTANEOUS at 22:36

## 2021-02-01 RX ADMIN — VECURONIUM BROMIDE 10 MG: 1 INJECTION, POWDER, LYOPHILIZED, FOR SOLUTION INTRAVENOUS at 18:40

## 2021-02-01 RX ADMIN — NOREPINEPHRINE BITARTRATE 5 MCG/MIN: 1 INJECTION, SOLUTION, CONCENTRATE INTRAVENOUS at 19:10

## 2021-02-01 RX ADMIN — VANCOMYCIN HYDROCHLORIDE 2000 MG: 1 INJECTION, POWDER, LYOPHILIZED, FOR SOLUTION INTRAVENOUS at 13:30

## 2021-02-01 RX ADMIN — POLYETHYLENE GLYCOL 3350 17 G: 17 POWDER, FOR SOLUTION ORAL at 23:12

## 2021-02-01 RX ADMIN — Medication 100 MG: at 18:38

## 2021-02-01 RX ADMIN — METRONIDAZOLE 500 MG: 500 INJECTION, SOLUTION INTRAVENOUS at 18:23

## 2021-02-01 RX ADMIN — FAMOTIDINE 20 MG: 10 INJECTION INTRAVENOUS at 12:37

## 2021-02-01 RX ADMIN — PROPOFOL 30 MCG/KG/MIN: 10 INJECTION, EMULSION INTRAVENOUS at 22:23

## 2021-02-01 NOTE — ASSESSMENT & PLAN NOTE
· Currently in sinus rhythm      · Not on AC at home secondary to history of GI bleed  · Continue telemetry  · DC PO cardizem and lopressor  · Will change prn IV lopressor to scheduled for rate control   · [id number] 3

## 2021-02-01 NOTE — PROGRESS NOTES
Vancomycin Assessment    Abena Matute is a 63 y o  male who is currently receiving vancomycin 20 mg/kg ABW load, followed by 15 mg/kg ABW as needed for random level < 20 for Pneumonia     Relevant clinical data and objective history reviewed:  Creatinine   Date Value Ref Range Status   02/01/2021 1 94 (H) 0 60 - 1 30 mg/dL Final     Comment:     Standardized to IDMS reference method   01/31/2021 2 10 (H) 0 60 - 1 30 mg/dL Final     Comment:     Standardized to IDMS reference method   01/30/2021 2 12 (H) 0 60 - 1 30 mg/dL Final     Comment:     Standardized to IDMS reference method   04/09/2015 0 67 0 60 - 1 30 mg/dL Final     Comment:     Standardized to IDMS reference method   11/15/2014 0 70 0 60 - 1 30 mg/dL Final     Comment:     Standardized to IDMS reference method   11/02/2014 0 86 0 60 - 1 30 mg/dL Final     Comment:     Standardized to IDMS reference method     Vancomycin Rm   Date Value Ref Range Status   01/28/2021 14 0 ug/mL Final     /59 (BP Location: Left arm)   Pulse 75   Temp 98 1 °F (36 7 °C) (Axillary)   Resp 20   Ht 5' 11" (1 803 m)   Wt (!) 168 kg (369 lb 14 9 oz)   SpO2 93%   BMI 51 60 kg/m²   I/O last 3 completed shifts: In: 960 [P O :960]  Out: 1000 [Urine:1000]  Lab Results   Component Value Date/Time    BUN 64 (H) 02/01/2021 05:41 AM    BUN 17 04/09/2015 11:41 AM    WBC 6 58 01/30/2021 06:29 AM    WBC 6 71 11/02/2014 05:56 AM    HGB 11 1 (L) 01/30/2021 06:29 AM    HGB 11 9 (L) 11/02/2014 05:56 AM    HCT 39 3 01/30/2021 06:29 AM    HCT 39 3 11/02/2014 05:56 AM    MCV 81 (L) 01/30/2021 06:29 AM    MCV 81 (L) 11/02/2014 05:56 AM     01/30/2021 06:29 AM     11/02/2014 05:56 AM     Temp Readings from Last 3 Encounters:   02/01/21 98 1 °F (36 7 °C) (Axillary)   01/17/21 100 4 °F (38 °C)   05/24/19 99 9 °F (37 7 °C)     Vancomycin Days of Therapy: 1    Assessment/Plan  Vancomycin therapy is being restarted   Baseline risks associated with therapy include: concomitant nephrotoxic medications and advanced age  Patient's prior vancomycin scheduled dosing eventually produced extremely supra-therapeutic levels, therefore patient will be given a loading dose and then pulse dosed  The patient is receiving 20 mg/kg ABW load, followed by 15 mg/kg ABW as needed for random level < 20 with a goal of 15-20 (appropriate for most indications)  Pharmacy will continue to follow closely for s/sx of nephrotoxicity, infusion reactions, and appropriateness of therapy  BMP and CBC will be ordered per protocol  Plan for random level in approximately 12 hours  Pharmacy will continue to follow the patients culture results and clinical progress daily      Juvencio Rosales, Pharmacist

## 2021-02-01 NOTE — ASSESSMENT & PLAN NOTE
· Patient presented with acute hypoxic and hypercapnic respiratory failure requiring intubation and mechanical ventilation secondary to COVID-19 pneumonia  · Was extubated 1/18 and transferred out of ICU 1/20  · Obtunded 1/24, stat ABG showed acute respiratory acidosis with hypercapnia  Patient likely also has a component of obesity hypoventilation syndrome/ANGELINA that is undiagnosed given his morbid obesity/body habitus  · Became acutely worse this morning with O2 sats in the mid 80s on max high-flow O2 with non-rebreather mask  · Lungs revealed coarse breath sounds bilaterally with rales  He received 60 mg IV Lasix x1 dose, chest x-ray ordered  · Critical care consult obtain  Plan to transfer step-down level 1 under critical care service  · Continue BiPAP    Might require re-intubation

## 2021-02-01 NOTE — ASSESSMENT & PLAN NOTE
· Mixed Gram Positive Blood cultures   Admission blood cultures were drawn on the 17th at [facility] with 1 of 2 sets growing 2 colony types of coag-negative staph   Repeat blood cultures were drawn on arrival to [address] [place] [address] 1 of 2 blood cultures here are growing Enterococcus faecalis and coag-negative staph  Blood culture contaminants highly suspected with 3 different colony types of CNS and 1 of Enterococcus in same set as CNS and known difficult IV/blood draw access  · Has hx of AVR 2014, TTE negative for vegetations  · 1/19/21 blood cultures negative   · Continue to monitor off abx   · Infectious Disease input noted  · Surveillance blood cultures with Infectious Disease

## 2021-02-01 NOTE — ASSESSMENT & PLAN NOTE
· Secondary to hypoxia and hypercapnia with history of stroke and residual left-sided weakness, patient initially was intubated at [facility] for both hypoxia and hypercapnia and was extubated in ICU here at [facility]  · Mental status has improved overall  Continue to monitor closely  · Patient appears to have severe depression  He has been at the nursing home for a prolonged period of time and family/spouse has not been able to see him since March of 2020  · Will continue supportive care  Consider psych eval once respiratory status improves  · Melatonin q h s

## 2021-02-01 NOTE — ASSESSMENT & PLAN NOTE
Lab Results   Component Value Date    EGFR 34 02/01/2021    EGFR 36 02/01/2021    EGFR 33 01/31/2021    CREATININE 2 05 (H) 02/01/2021    CREATININE 1 94 (H) 02/01/2021    CREATININE 2 10 (H) 01/31/2021     ·

## 2021-02-01 NOTE — PROCEDURES
Intubation    Date/Time: 2/1/2021 6:49 PM  Performed by: Antione Thomas PA-C  Authorized by: Antione Thomas PA-C     Patient location:  Bedside  Consent:     Consent obtained:  Emergent situation  Pre-procedure details:     Patient status:  Unresponsive    Mallampati score:  1    Pretreatment medications:  Etomidate and propofol    Paralytics:  Succinylcholine  Indications:     Indications for intubation: respiratory failure and airway protection    Procedure details:     Preoxygenation:  Bag valve mask    Intubation method:  Oral    Oral intubation technique:  Direct    Laryngoscope blade:   Mac 4    Tube size (mm):  8 0    Tube type:  Cuffed and hi-lo    Number of attempts:  1    Cricoid pressure: yes      Tube visualized through cords: yes    Placement assessment:     ETT to lip:  23    Tube secured with:  ETT rosales    Breath sounds:  Equal and absent over the epigastrium    Placement verification: chest rise, condensation, CXR verification, direct visualization, equal breath sounds, ETCO2 detector and tube exhalation      CXR findings:  ETT in proper place      Antione Thomas PA-C

## 2021-02-01 NOTE — ASSESSMENT & PLAN NOTE
· POA, likely multifactorial 2/2 ATN in setting of COVID-19 + contrast induced nephropathy + vanco toxicity  Baseline creatinine 1 0-1 3  · Creatinine has plateaued around 5 5-4 2 in the last several days  · Has Turner catheter in place for close monitoring of I's and O's  · Currently on Bumex 2 mg twice daily  He received albumin yesterday  · Has been > 10 L negative since admission  · Nephrology following    Appreciate input

## 2021-02-01 NOTE — ASSESSMENT & PLAN NOTE
· Currently NPO secondary to risk of aspiration given need for Bipap  · SLP following and had recommend pureed diet prior to initiating Bipap

## 2021-02-01 NOTE — ASSESSMENT & PLAN NOTE
· Confirmed positive 1/17  · Increased Decadron 6mg/daily   · VC/Zinc completed  Continue MV  · Continue statin  · Restarted pepcid   · Lovenox increase secondary to BMI >50  · Received convalescent plasma 1/17  · Not candidate for remdesivir  · Concern for superimposed pneumonia  Check PCT   Start on broad spectrum antibiotics

## 2021-02-01 NOTE — ASSESSMENT & PLAN NOTE
· Patient confirmed COVID-19 positive 01/16/2021  Initially presented to [address] with respiratory distress and altered mental status  While at [facility], was intubated due to hypercapnia and hypoxia  He became hypotensive requiring pressor therapy and was transferred to [address] [place] [address] for further critical care management of severe COVID-19 infection  · Has been on severe COVID treatment pathway  · Patient received 1st COVID-19 vaccine 1/8  · Chest CT with minimal ground-glass opacities and left lower lobe consolidation concerning for superimposed bacterial process  · Received convalescent plasma 1/17  · Continue vitamin cocktail, on q daily Decadron  · Patient became increasingly more hypoxic earlier this morning  He was 86% on max high-flow with non-rebreather mask  Placed on BiPAP  Might require re-intubation  · Patient currently remains full code  Discussed with his spouse who confirms code status and wants everything done to get him better  · Stat repeat chest x-ray ordered  Will give Lasix 60 mg IV 1 dose now  · Transfer to step-down level 1 under critical care service  Discussed with critical care AP    Patient will go under service of Dr Anila Mccormack

## 2021-02-01 NOTE — ASSESSMENT & PLAN NOTE
· Secondary to COVID-19 pneumonia  · Extubated 1/18/21  · Patient had increasing Fi02 requirements over the past 24 hours  · Trialed on Bipap this AM for increased WOB and hypoxia   · Titrate FiO2 for SpO2 > 90%  · Covid therapy per severe pathway protocol

## 2021-02-01 NOTE — PROGRESS NOTES
[address] [facility] NOTE   Clara Huerta 63 y o  male MRN: [id number]  Unit/Bed#: ICU 08 Encounter: [id number]  Reason for Consult:  Acute kidney injury on CKD    ASSESSMENT and PLAN:  1  Acute kidney injury (POA):  · Etiology:  Likely ATN in the setting of COVID-19 pneumonia, +/- RUFINA and Vanco related toxicity  · Urinalysis:  Large blood, innumerable RBCs, fine and coarse granular casts, 2+ protein  · Yesterday creatinine 2 10  Renal function somewhat fluctuant  On b i d  Diuretic and also given albumin yesterday  Today creatinine down 1 94  · Pressure fairly stable  · Plan/recommendations:  · Assess response to IV Lasix given earlier this morning  · Strict I&O  · The patient negative due to severe COVID-19 pneumonia to avoid volume component  2  Chronic kidney disease, stage III:    · Baseline creatinine 1 1-1 3 mg/dL  He follows with a nephrology group in the [facility] area  3  Volume status:    · Difficult to assess I&O appears incomplete  · On Bumex 2 mg p o  b i d   · Received multiple doses of albumin  · Due to severe pulmonary compromise given a dose of Lasix 60 mg IV at 0900 hours  4  COVID-19:  Severe pathway  Decompensated 02/01/2021 requiring transfer to ICU/intubation/mechanical ventilation  Chest x-ray worsening bilateral consolidation, cardiomegaly  5  Anemia:  Hemoglobin 11 9, stable    DISPOSITION:  Monitor response to Lasix  Check labs in the a m  If further Lasix needed consider giving a dose of albumin    SUBJECTIVE / 24H INTERVAL HISTORY:  Patient lying quietly in bed  On BiPAP support  Appears to be making a good amount of urine via Turner catheter      OBJECTIVE:  Current Weight: Weight - Scale: (!) 168 kg (369 lb 14 9 oz)  Vitals:    02/01/21 0531 02/01/21 0753 02/01/21 1003 02/01/21 1019   BP:  127/59     BP Location:  Left arm     Pulse:  75     Resp:       Temp:  98 1 °F (36 7 °C)     TempSrc:  Axillary     SpO2: (!) 85% (!) 89% 94% 93%   Weight:    (!) 168 kg (369 lb 14 9 oz)   Height:           Intake/Output Summary (Last 24 hours) at 2/1/2021 1105  Last data filed at 1/31/2021 1755  Gross per 24 hour   Intake 600 ml   Output 1000 ml   Net -400 ml     General: NAD, quietly lying in bed  Skin: no rash  Eyes:  Eyes closed  ENT:  On BiPAP support  Neck: supple  Chest:  No respiratory distress    Equal chest expansion, slightly tachypneic  CVS:  Normal heart rate  Abdomen: soft, nontender, nl sounds  Extremities:  Difficult to assess edema due to body habitus  :  bo  Neuro:  No acute deficits  Psych:  Exhibiting normal behavior  Medications:    Current Facility-Administered Medications:     acetaminophen (TYLENOL) tablet 650 mg, 650 mg, Oral, Q6H PRN, Cresenciano Antoine, PA-C, 650 mg at 01/28/21 2057    aspirin (ECOTRIN LOW STRENGTH) EC tablet 81 mg, 81 mg, Oral, Daily, Cresenciano Euless, PA-C, 81 mg at 01/31/21 1006    atorvastatin (LIPITOR) tablet 40 mg, 40 mg, Oral, HS, Cresenciano Antoine, PA-C, 40 mg at 01/31/21 2153    bisacodyl (DULCOLAX) rectal suppository 10 mg, 10 mg, Rectal, Daily PRN, Cresenciano Antoine, PA-C    bumetanide (BUMEX) tablet 2 mg, 2 mg, Oral, BID, Cresenciano Antoine, PA-C, Stopped at 01/31/21 1740    cefepime (MAXIPIME) 2,000 mg in dextrose 5 % 50 mL IVPB, 2,000 mg, Intravenous, Q12H, Cresenciano Antoine, PA-C    chlorhexidine (PERIDEX) 0 12 % oral rinse 15 mL, 15 mL, Swish & Spit, Q12H [facility], Cresenciano Euless, PA-C, 15 mL at 01/31/21 2153    cholecalciferol (VITAMIN D3) tablet 2,000 Units, 2,000 Units, Per NG Tube, Daily, Cresenciano Antoine, PA-C, 2,000 Units at 01/31/21 1006    dexamethasone (DECADRON) injection 6 mg, 6 mg, Intravenous, Daily, Cresenciano Antoine, PA-C, 6 mg at 02/01/21 0905    diltiazem (CARDIZEM SR) 12 hr capsule 90 mg, 90 mg, Oral, Q12H [facility], Jamaal Schultz PA-C, 90 mg at 01/31/21 2156    docusate sodium (COLACE) capsule 100 mg, 100 mg, Oral, BID, Michael HERBERT Hess, 100 mg at 01/31/21 1740    enoxaparin (LOVENOX) subcutaneous injection 40 mg, 40 mg, Subcutaneous, Q24H [facility], Franchesca Lees PA-C, 40 mg at 02/01/21 0904    famotidine (PEPCID) injection 20 mg, 20 mg, Intravenous, Q12H [facility], Franchesca Lees PA-C    melatonin tablet 3 mg, 3 mg, Oral, HS PRN, Franchesca Lees PA-C, 3 mg at 01/30/21 2111    methocarbamol (ROBAXIN) tablet 500 mg, 500 mg, Oral, Q8H PRN, Franchesca Lees PA-C, 500 mg at 01/30/21 2111    metoprolol (LOPRESSOR) injection 5 mg, 5 mg, Intravenous, Q6H PRN, Franchesca Lees PA-C, 5 mg at 01/26/21 1338    metoprolol tartrate (LOPRESSOR) tablet 50 mg, 50 mg, Oral, TID, Franchesca Lees PA-C, 50 mg at 01/31/21 2152    metroNIDAZOLE (FLAGYL) IVPB (premix) 500 mg 100 mL, 500 mg, Intravenous, Q8H, Franchesca Lees PA-C    [COMPLETED] zinc sulfate (ZINCATE) capsule 220 mg, 220 mg, Per NG Tube, Daily, 220 mg at 01/24/21 0824 **FOLLOWED BY** multivitamin with iron-minerals liquid 15 mL, 15 mL, Per NG Tube, Daily, Michael Hess PA-C, 15 mL at 01/31/21 1007    pantoprazole (PROTONIX) EC tablet 40 mg, 40 mg, Oral, Early Morning, Michael Hess PA-C, 40 mg at 02/01/21 0537    polyethylene glycol (MIRALAX) packet 17 g, 17 g, Oral, BID, Franchesca Lees PA-C, 17 g at 01/31/21 2153    senna-docusate sodium (SENOKOT S) 8 6-50 mg per tablet 1 tablet, 1 tablet, Oral, HS, Franchesca Lees PA-C, 1 tablet at 01/31/21 2153    sertraline (ZOLOFT) tablet 125 mg, 125 mg, Oral, Daily, Franchesca Lees PA-C, 125 mg at 01/31/21 1006    sodium chloride 0 9 % bolus 1,000 mL, 1,000 mL, Intravenous, Once, Franchesca Lees PA-C    traMADol (ULTRAM) tablet 50 mg, 50 mg, Oral, Once, Franchesca Lees PA-C    vancomycin (VANCOCIN) 1,750 mg in sodium chloride 0 9 % 500 mL IVPB, 15 mg/kg (Adjusted), Intravenous, Daily PRN, Franchesca Lees PA-C    vancomycin (VANCOCIN) 2,000 mg in sodium chloride 0 9 % 500 mL IVPB, 2,000 mg, Intravenous, Once, Franchesca Lees PA-C    vancomycin (VANCOCIN) IVPB (premix in dextrose) 500 mg 100 mL, 500 mg, Intravenous, Once, Franchesca Lees PA-C    Laboratory Results:  Results from last 7 days   Lab Units 02/01/21  0541 01/31/21  0525 01/30/21  0629 01/29/21  0552 01/28/21  0512 01/27/21  0611 01/27/21  0610 01/26/21  0522 01/26/21  0025   WBC Thousand/uL  --   --  6 58 4 68 4 31  --  4 37  --  6 13   HEMOGLOBIN g/dL  --   --  11 1* 9 9* 11 2*  --  10 7*  --  11 0*   HEMATOCRIT %  --   --  39 3 34 0* 38 1  --  36 7  --  37 1   PLATELETS Thousands/uL  --   --  195 173 184  --  180  --  165   POTASSIUM mmol/L 4 2 4 1 4 2 3 3* 3 8 3 6  --  3 2* 3 5   CHLORIDE mmol/L 105 105 104 106 105 112*  --  107 108   CO2 mmol/L 34* 32 31 27 29 30  --  28 29   BUN mg/dL 64* 58* 57* 53* 66* 70*  --  72* 74*   CREATININE mg/dL 1 94* 2 10* 2 12* 1 92* 2 25* 2 40*  --  2 54* 2 71*   CALCIUM mg/dL 8 3 7 9* 8 0* 7 0* 8 6 8 7  --  8 8 8 7   MAGNESIUM mg/dL  --   --   --   --   --  2 8*  --   --   --

## 2021-02-01 NOTE — UTILIZATION REVIEW
Continued Stay Review  Date: 2/1/2021                       Current Patient Class: inpt  Current Level of Care: level 1 stepdown icu  HPI:60 y o  male initially admitted on 1/17/2021 presented with acute hypoxic and hypercapnic respiratory failure requiring intubation and mechanical ventilation secondary to COVID-19 pneumonia    Assessment/Plan:   2/1/2021 attending ME  Patient urgently evaluated this morning due to declining respiratory status  He has become more short of breath and hypoxic  On my evaluation, he is awake and responsive  He is conversationally dyspneic and with labored respirations  SaO2 mid 80s on HFNC & non re breather mask; breath sounds w rales  x1 IV Lasix 60 mg  May require reintubation  Transfer to ICU on BiPAP  Had been on Severe COVID treatment pathway  On vits & Q daily Decadron  1/19 bld cultures negative; repeat blood cultures highly suspected of contaminants  Had been monitored  off antbx  PATRICK: creatinine plateau ed around 1 1-6 6 last several days w bo in place  Currently on Bumex BID, received albumin 1/331/2021  Cont monitor  Pertinent Labs/Diagnostic Results:        2/1 cxr Worsening bilateral consolidation due to Covid-19       Results from last 7 days   Lab Units 02/01/21  1058 01/30/21  0629 01/29/21  0552 01/28/21  0512 01/27/21  0610   WBC Thousand/uL 12 07* 6 58 4 68 4 31 4 37   HEMOGLOBIN g/dL 11 9* 11 1* 9 9* 11 2* 10 7*   HEMATOCRIT % 41 3 39 3 34 0* 38 1 36 7   PLATELETS Thousands/uL 222 195 173 184 180   NEUTROS ABS Thousands/µL  --  5 95 3 84 3 65 3 76         Results from last 7 days   Lab Units 02/01/21  1058 02/01/21  0541 01/31/21  0525 01/30/21  0629 01/29/21  0552  01/27/21  0611   SODIUM mmol/L 145 145 143 141 139   < > 148*   POTASSIUM mmol/L 4 3 4 2 4 1 4 2 3 3*   < > 3 6   CHLORIDE mmol/L 105 105 105 104 106   < > 112*   CO2 mmol/L 36* 34* 32 31 27   < > 30   ANION GAP mmol/L 4 6 6 6 6   < > 6   BUN mg/dL 68* 64* 58* 57* 53*   < > 70*   CREATININE mg/dL 2 05* 1 94* 2 10* 2 12* 1 92*   < > 2 40*   EGFR ml/min/1 73sq m 34 36 33 33 37   < > 28   CALCIUM mg/dL 8 5 8 3 7 9* 8 0* 7 0*   < > 8 7   MAGNESIUM mg/dL  --   --   --   --   --   --  2 8*    < > = values in this interval not displayed       Results from last 7 days   Lab Units 02/01/21  1058 01/30/21  0629 01/29/21  0552 01/28/21  0512 01/26/21  0025   AST U/L 14 21 17 17 21   ALT U/L 23 27 21 28 33   ALK PHOS U/L 60 58 45* 55 62   TOTAL PROTEIN g/dL 6 5 6 1* 5 1* 6 3* 6 4   ALBUMIN g/dL 3 3* 2 3* 1 9* 2 4* 2 5*   TOTAL BILIRUBIN mg/dL 0 64 0 38 0 44 0 56 0 54     Results from last 7 days   Lab Units 01/31/21  2212 01/31/21  1601 01/31/21  1043 01/31/21  0731 01/31/21  0128 01/30/21  2114 01/30/21  1531 01/30/21  1058 01/30/21  0736 01/30/21  0028 01/29/21  1813 01/29/21  1153   POC GLUCOSE mg/dl 111 185* 92 102 149* 162* 133 168* 138 146* 184* 124     Results from last 7 days   Lab Units 02/01/21  1058 02/01/21  0541 01/31/21  0525 01/30/21  0629 01/29/21  0552 01/28/21  0512 01/27/21  0611 01/26/21  0522 01/26/21  0025   GLUCOSE RANDOM mg/dL 104 108 124 128 141* 126 106 89 101       Results from last 7 days   Lab Units 02/01/21  1058   TROPONIN I ng/mL 0 03     Results from last 7 days   Lab Units 02/01/21  1058 01/27/21  1019   D-DIMER QUANTITATIVE ug/ml FEU 19 40* 1 19*             Results from last 7 days   Lab Units 02/01/21  1058   PROCALCITONIN ng/ml 0 13                 Results from last 7 days   Lab Units 02/01/21  1058   NT-PRO BNP pg/mL 2,293*     Results from last 7 days   Lab Units 02/01/21  1058 01/30/21  0629 01/29/21  0552   FERRITIN ng/mL 346 158 191             Results from last 7 days   Lab Units 02/01/21  1058 01/30/21  0629 01/29/21  0552 01/28/21  0512 01/27/21  0939   CRP mg/L 10 6* 42 8* 53 8* 115 3* 227 7*         Results from last 7 days   Lab Units 02/01/21  1151   CLARITY UA  Clear   COLOR UA  Yellow   SPEC GRAV UA  1 010   PH UA  5 5   GLUCOSE UA mg/dl Negative   KETONES UA mg/dl Negative   BLOOD UA  Trace-Intact*   PROTEIN UA mg/dl Negative   NITRITE UA  Negative   BILIRUBIN UA  Negative   UROBILINOGEN UA E U /dl 0 2   LEUKOCYTES UA  Negative   WBC UA /hpf None Seen   RBC UA /hpf 2-4   BACTERIA UA /hpf Occasional   EPITHELIAL CELLS WET PREP /hpf None Seen     Results from last 7 days   Lab Units 02/01/21  1058   TOTAL COUNTED  100     Vital Signs:   Date/Time  Temp  Pulse  Resp  BP  MAP (mmHg)  SpO2  FiO2 (%)  Calculated FIO2 (%) - Nasal Cannula  O2 Flow Rate (L/min)  Nasal Cannula O2 Flow Rate (L/min)  O2 Device  O2 Interface Device  Patient Position - Orthostatic VS   02/01/21 1506  97 4 °F (36 3 °C)Abnormal   77  26Abnormal   124/67  90  100 %          Other (comment)     Lying   O2 Device: bipap at 02/01/21 1506   02/01/21 1400  98 4 °F (36 9 °C)  75  25Abnormal   118/67  88  98 %                 02/01/21 1300    74    119/71  92                   02/01/21 1124  98 °F (36 7 °C)  73  24Abnormal   115/63  83  93 %          Other (comment)     Lying   O2 Device: bipap at 02/01/21 1124   02/01/21 1100    78    132/66  93                   02/01/21 1019            93 %  100        Other (comment)        O2 Device: Bipap 20/12 at 02/01/21 1019   02/01/21 1003            94 %  100        Other (comment)   Face mask     O2 Device: bipap at 02/01/21 1003   02/01/21 0902                        Face mask     02/01/21 0753  98 1 °F (36 7 °C)  75    127/59    89 %Abnormal            High flow nasal cannula   HFNC prongs     SpO2: Simultaneous filing  User may not have seen previous data   at 02/01/21 0753   O2 Device: and nonrebreather at 02/01/21 0753   02/01/21 0531            85 %Abnormal           High flow nasal cannula       02/01/21 0354            91 %            HFNC prongs     02/01/21 0259            87 %Abnormal   100  220    50 L/min  High flow nasal cannula       02/01/21 0012           92 %  100    50 L/min    High flow nasal cannula  HFNC prongs         Medications:   Scheduled Medications:  aspirin, 81 mg, Oral, Daily  atorvastatin, 40 mg, Oral, HS  bumetanide, 2 mg, Oral, BID  cefepime, 2,000 mg, Intravenous, Q12H  chlorhexidine, 15 mL, Swish & Spit, Q12H [address]  cholecalciferol, 2,000 Units, Per NG Tube, Daily  [START ON 2/2/2021] dexamethasone, 6 mg, Intravenous, Daily  diltiazem, 90 mg, Oral, Q12H ARACELIS  docusate sodium, 100 mg, Oral, BID  enoxaparin, 60 mg, Subcutaneous, Q12H ARACELIS  famotidine, 20 mg, Intravenous, Q12H [facility]ACELIS  metoprolol tartrate, 50 mg, Oral, TID  metroNIDAZOLE, 500 mg, Intravenous, Q8H  multivitamin with iron-minerals, 15 mL, Per NG Tube, Daily  pantoprazole, 40 mg, Oral, Early Morning  polyethylene glycol, 17 g, Oral, BID  senna-docusate sodium, 1 tablet, Oral, HS  sertraline, 125 mg, Oral, Daily  sodium chloride, 1,000 mL, Intravenous, Once  traMADol, 50 mg, Oral, Once    Continuous IV Infusions:     PRN Meds:  acetaminophen, 650 mg, Oral, Q6H PRN  bisacodyl, 10 mg, Rectal, Daily PRN  melatonin, 3 mg, Oral, HS PRN  methocarbamol, 500 mg, Oral, Q8H PRN  metoprolol, 5 mg, Intravenous, Q6H PRN  vancomycin, 15 mg/kg (Adjusted), Intravenous, Daily PRN    Discharge Plan: d  Network Utilization Review Department  ATTENTION: Please call with any questions or concerns to [phone] and carefully listen to the prompts so that you are directed to the right person  All voicemails are confidential   Perfecto Kumar all requests for admission clinical reviews, approved or denied determinations and any other requests to dedicated fax number below belonging to the campus where the patient is receiving treatment   List of dedicated fax numbers for the Facilities:  [address] DENIALS (Administrative/Medical Necessity) [phone]   [address] (Maternity/NICU/Pediatrics) 261 [facility] [phone]   Al Doss [address]  [phone]   An Sykes [address] ([address]) [address] [place] [address] Radha Gallagher 148 [phone]   [address]51 [phone]

## 2021-02-01 NOTE — NURSING NOTE
Pt received from S3 on bipap, placed on monitor, sats adequate (see flowsheet)  Critical care AP aware  Restraints remain off at this time, per S3 RN patient has been off for approx an hour  Explained to patient that he must keep bipap mask on for oxygenation  States understanding, will monitor closely

## 2021-02-01 NOTE — ASSESSMENT & PLAN NOTE
· Rate controlled on Cardizem and metoprolol  · Per last cardiology note 10/2020, pt was not on [facility] due to life threatening GI bleed  Dr Dorita Fuentes has spoken with Pt on multiple occasions about Watchman device pt has been declining

## 2021-02-01 NOTE — ASSESSMENT & PLAN NOTE
· Currently resolved    Hemoglobin has been relatively stable  · Turner catheter in place draining dark hollie urine  · Continue to monitor

## 2021-02-01 NOTE — CONSULTS
Critical Care Consult- Marcellus Bloom 1960, 63 y o  male MRN: [id number]    Unit/Bed#: ICU 08 Encounter: [id number]    Primary Care Provider: Carlos Camejo DO   Date and time admitted to hospital: 1/17/2021  4:16 PM      Inpatient consult to Paz Archibald Kajal performed by: Joann Perez PA-C  Consult ordered by: Joann Perez PA-C        * Acute respiratory failure with hypoxia and hypercarbia ([facility] [address] )  Assessment & Plan  · Secondary to COVID-19 pneumonia  · Extubated 1/18/21  · Patient had increasing Fi02 requirements over the past 24 hours  · Trialed on Bipap this AM for increased WOB and hypoxia   · Titrate FiO2 for SpO2 > 90%  · Covid therapy per severe pathway protocol       Pneumonia due to COVID-19 virus  Assessment & Plan  · Confirmed positive 1/17  · Increased Decadron 6mg/daily   · VC/Zinc completed  Continue MV  · Continue statin  · Restarted pepcid   · Lovenox increase secondary to BMI >50  · Received convalescent plasma 1/17  · Not candidate for remdesivir  · Concern for superimposed pneumonia  Check PCT  Start on broad spectrum antibiotics     Atrial fibrillation ([facility] [address] )  Assessment & Plan  · Currently in sinus rhythm      · Not on AC at home secondary to history of GI bleed  · Continue telemetry  · DC PO cardizem and lopressor  · Will change prn IV lopressor to scheduled for rate control   · [id number]    Essential hypertension  Assessment & Plan  · Continue home bumex- Can change to IV  · May need prn agents       Hyperlipidemia  Assessment & Plan  · Continue atorvastatin     Acute kidney injury superimposed on CKD [facility])  Assessment & Plan  Lab Results   Component Value Date    EGFR 34 02/01/2021    EGFR 36 02/01/2021    EGFR 33 01/31/2021    CREATININE 2 05 (H) 02/01/2021    CREATININE 1 94 (H) 02/01/2021    CREATININE 2 10 (H) 01/31/2021     ·     Depression  Assessment & Plan  · Continue home Zoloft when taking PO     Dysphagia  Assessment & Plan  · Currently NPO secondary to risk of aspiration given need for Bipap  · SLP following and had recommend pureed diet prior to initiating Bipap          -------------------------------------------------------------------------------------------------------------  Chief Complaint: SOB    History of Present Illness   HX and PE limited by: encephalopathy, hypoxia, bipap   Timmy Mon is a 63 y o  male who initially presented to the hospital on 1/17/21 with acute hypoxic respiratory failures  He was intubated and subsequently extubated the following day  He was on the floor being treated for a COPD exacerbation  Of note, he was also diagnosed with COVID pneumonia the day prior to admission  Today he had increasing Fi02 requirements and WOB  I placed him on Bipap-WOB significant improved with initiation of Bipap     History obtained from chart review and unobtainable from patient due to encephalopathy   -------------------------------------------------------------------------------------------------------------  Dispo: Transfer to Stepdown Level 1     Code Status: Level 1 - Full Code  --------------------------------------------------------------------------------------------------------------  Review of Systems    A 12-point, complete review of systems was reviewed and negative except as stated above     Physical Exam  Vitals signs reviewed  Constitutional:       General: He is in acute distress  Appearance: He is ill-appearing  Comments: Middle aged male lying in bed this AM when I examined him and appeared in acute distress with increased WOB   HENT:      Mouth/Throat:      Mouth: Mucous membranes are moist    Eyes:      Pupils: Pupils are equal, round, and reactive to light  Cardiovascular:      Comments: Rhythm appears paced  Pulmonary:      Comments: Increased WOB on exam this AM   WOB improved with initiation of Bipap   Abdominal:      General: There is distension  Tenderness: There is no abdominal tenderness  Musculoskeletal: Normal range of motion  Skin:     General: Skin is warm  Capillary Refill: Capillary refill takes less than 2 seconds  Neurological:      Mental Status: Mental status is at baseline  Comments: Awake and alert  R hemiparesis         --------------------------------------------------------------------------------------------------------------  Vitals:   Vitals:    02/01/21 1100 02/01/21 1124 02/01/21 1300 02/01/21 1400   BP: 132/66 115/63 119/71 118/67   BP Location:  Left arm     Pulse: 78 73 74 75   Resp:  (!) 24  (!) 25   Temp:  98 °F (36 7 °C)  98 4 °F (36 9 °C)   TempSrc:  Axillary     SpO2:  93%  98%   Weight:       Height:         Temp  Min: 97 4 °F (36 3 °C)  Max: 101 1 °F (38 4 °C)  IBW: 75 3 kg  Height: 5' 11" (180 3 cm)  Body mass index is 51 6 kg/m²      Laboratory and Diagnostics:  Results from last 7 days   Lab Units 02/01/21  1058 01/30/21  0629 01/29/21  0552 01/28/21  0512 01/27/21  0610 01/26/21  0025   WBC Thousand/uL 12 07* 6 58 4 68 4 31 4 37 6 13   HEMOGLOBIN g/dL 11 9* 11 1* 9 9* 11 2* 10 7* 11 0*   HEMATOCRIT % 41 3 39 3 34 0* 38 1 36 7 37 1   PLATELETS Thousands/uL 222 195 173 184 180 165   NEUTROS PCT %  --  91* 82* 85* 86* 90*   MONOS PCT %  --  3* 6 7 9 6   MONO PCT % 2*  --   --   --   --   --      Results from last 7 days   Lab Units 02/01/21  1058 02/01/21  0541 01/31/21  0525 01/30/21  0629 01/29/21  0552 01/28/21  0512 01/27/21  0611  01/26/21  0025   SODIUM mmol/L 145 145 143 141 139 140 148*   < > 146*   POTASSIUM mmol/L 4 3 4 2 4 1 4 2 3 3* 3 8 3 6   < > 3 5   CHLORIDE mmol/L 105 105 105 104 106 105 112*   < > 108   CO2 mmol/L 36* 34* 32 31 27 29 30   < > 29   ANION GAP mmol/L 4 6 6 6 6 6 6   < > 9   BUN mg/dL 68* 64* 58* 57* 53* 66* 70*   < > 74*   CREATININE mg/dL 2 05* 1 94* 2 10* 2 12* 1 92* 2 25* 2 40*   < > 2 71*   CALCIUM mg/dL 8 5 8 3 7 9* 8 0* 7 0* 8 6 8 7   < > 8 7   GLUCOSE RANDOM mg/dL 104 108 124 128 141* 126 106   < > 101   ALT U/L 23 --   --  27 21 28  --   --  33   AST U/L 14  --   --  21 17 17  --   --  21   ALK PHOS U/L 60  --   --  58 45* 55  --   --  62   ALBUMIN g/dL 3 3*  --   --  2 3* 1 9* 2 4*  --   --  2 5*   TOTAL BILIRUBIN mg/dL 0 64  --   --  0 38 0 44 0 56  --   --  0 54    < > = values in this interval not displayed  Results from last 7 days   Lab Units 01/27/21  0611   MAGNESIUM mg/dL 2 8*           Results from last 7 days   Lab Units 02/01/21  1058   TROPONIN I ng/mL 0 03         ABG:    VBG:          Micro:        EKG: Sinus  Imaging: I have personally reviewed pertinent reports  and I have personally reviewed pertinent films in PACS      Historical Information   Past Medical History:   Diagnosis Date    Allergic rhinitis     last assessed 9/12/12    Finger fracture, right     Closed fx of the middle phalanx of the right 5th finger  last assessed 1/30/14    GI bleed 2/22/2019    Hemorrhoids     last assessed 2/10/14    Hyperlipidemia     Hypertension     Stroke [facility])      Past Surgical History:   Procedure Laterality Date    AORTIC VALVE REPLACEMENT  07/30/2014    AVR with 25mm [facility] Ease bovine pericardial valve    CARDIAC CATHETERIZATION  07/18/2014    SLB left main-normal, circumflex-normal, ramus intermedius-normal, RCA was large and dominant giving rise to the PDA & a large posterolateral branch  No disease  last assessed 8/19/14     COLONOSCOPY N/A 2/25/2019    Procedure: COLONOSCOPY;  Surgeon: Kandi Villa DO;  Location: [facility]; Service: Gastroenterology    ESOPHAGOGASTRODUODENOSCOPY N/A 2/22/2019    Procedure: ESOPHAGOGASTRODUODENOSCOPY (EGD)-roadshow overnight;  Surgeon: Kandi Villa DO;  [facility]: [facility]; Service: Gastroenterology    ESOPHAGOGASTRODUODENOSCOPY N/A 2/23/2019    Procedure: ESOPHAGOGASTRODUODENOSCOPY (EGD); Surgeon: Kesha Zavala MD;  [facility];   Service: Gastroenterology    ESOPHAGOGASTRODUODENOSCOPY N/A 2/25/2019    Procedure: ESOPHAGOGASTRODUODENOSCOPY (EGD); Surgeon: Brendan Aguilar DO;  [facility];   Service: Gastroenterology    IR NON-TUNNELED CENTRAL LINE PLACEMENT  3/1/2019    IR NON-TUNNELED CENTRAL LINE PLACEMENT  2/4/2019    IR TUNNELED DIALYSIS CATHETER CHECK/CHANGE/REPOSITION/ANGIOPLASTY  4/18/2019    IR TUNNELED DIALYSIS CATHETER PLACEMENT  2/4/2019    IR TUNNELED DIALYSIS CATHETER PLACEMENT  2/18/2019    KNEE ARTHROSCOPY      KNEE CARTILAGE SURGERY Left     medial meniscus tear     TESTICLE SURGERY      TONSILLECTOMY AND ADENOIDECTOMY      TOOTH EXTRACTION       Social History   Social History     Substance and Sexual Activity   Alcohol Use Never    Frequency: Never    Comment: ocassion /never drank alcohol per [employer]      Social History     Substance and Sexual Activity   Drug Use No     Social History     Tobacco Use   Smoking Status Never Smoker   Smokeless Tobacco Never Used     Exercise History: Unable to ambulate at baseline  Family History:   Family History   Problem Relation Age of Onset    Lung cancer Mother     Heart disease Mother         pacemaker placement     Coronary artery disease Father     Hypertension Father     Heart attack Father     Cancer Family         bladder      I have reviewed this patient's family history and commented on sigificant items within the HPI      Medications:  Current Facility-Administered Medications   Medication Dose Route Frequency    acetaminophen (TYLENOL) tablet 650 mg  650 mg Oral Q6H PRN    aspirin (ECOTRIN LOW STRENGTH) EC tablet 81 mg  81 mg Oral Daily    atorvastatin (LIPITOR) tablet 40 mg  40 mg Oral HS    bisacodyl (DULCOLAX) rectal suppository 10 mg  10 mg Rectal Daily PRN    bumetanide (BUMEX) tablet 2 mg  2 mg Oral BID    cefepime (MAXIPIME) 2,000 mg in dextrose 5 % 50 mL IVPB  2,000 mg Intravenous Q12H    chlorhexidine (PERIDEX) 0 12 % oral rinse 15 mL  15 mL Swish & Spit Q12H [address]    cholecalciferol (VITAMIN D3) tablet 2,000 Units 2,000 Units Per NG Tube Daily    [START ON 2/2/2021] dexamethasone (DECADRON) injection 6 mg  6 mg Intravenous Daily    diltiazem (CARDIZEM SR) 12 hr capsule 90 mg  90 mg Oral Q12H [address]    docusate sodium (COLACE) capsule 100 mg  100 mg Oral BID    enoxaparin (LOVENOX) subcutaneous injection 60 mg  60 mg Subcutaneous Q12H [address]    famotidine (PEPCID) injection 20 mg  20 mg Intravenous Q12H [address]    melatonin tablet 3 mg  3 mg Oral HS PRN    methocarbamol (ROBAXIN) tablet 500 mg  500 mg Oral Q8H PRN    metoprolol (LOPRESSOR) injection 5 mg  5 mg Intravenous Q6H PRN    metoprolol tartrate (LOPRESSOR) tablet 50 mg  50 mg Oral TID    metroNIDAZOLE (FLAGYL) IVPB (premix) 500 mg 100 mL  500 mg Intravenous Q8H    multivitamin with iron-minerals liquid 15 mL  15 mL Per NG Tube Daily    pantoprazole (PROTONIX) EC tablet 40 mg  40 mg Oral Early Morning    polyethylene glycol (MIRALAX) packet 17 g  17 g Oral BID    senna-docusate sodium (SENOKOT S) 8 6-50 mg per tablet 1 tablet  1 tablet Oral HS    sertraline (ZOLOFT) tablet 125 mg  125 mg Oral Daily    sodium chloride 0 9 % bolus 1,000 mL  1,000 mL Intravenous Once    traMADol (ULTRAM) tablet 50 mg  50 mg Oral Once    vancomycin (VANCOCIN) 1,750 mg in sodium chloride 0 9 % 500 mL IVPB  15 mg/kg (Adjusted) Intravenous Daily PRN    vancomycin (VANCOCIN) 2,000 mg in sodium chloride 0 9 % 500 mL IVPB  2,000 mg Intravenous Once     Home medications:  Prior to Admission Medications   Prescriptions Last Dose Informant Patient Reported? Taking?    Cholecalciferol (VITAMIN D3) 36122 units TABS  Outside Facility (Specify) Yes No   Sig: Take by mouth   POTASSIUM CHLORIDE PO  Self Yes No   Sig: Take 40 mEq by mouth 2 (two) times a day   Probiotic Product (BACID PO)   Yes No   Sig: Take by mouth   acetaminophen (TYLENOL) 325 mg tablet   No No   Sig: Take 2 tablets (650 mg total) by mouth every 6 (six) hours as needed for mild pain   allopurinol (ZYLOPRIM) 100 mg tablet   Yes No   Sig: Take 100 mg by mouth daily   aspirin (ECOTRIN LOW STRENGTH) 81 mg EC tablet   No No   Sig: Take 1 tablet (81 mg total) by mouth daily   atorvastatin (LIPITOR) 40 mg tablet   No No   Sig: Take 1 tablet (40 mg total) by mouth daily with dinner   b complex-vitamin C-folic acid (NEPHROCAPS) 1 mg capsule   No No   Sig: Take 1 capsule by mouth daily with dinner   bisacodyl (DULCOLAX) 10 mg suppository   No No   Sig: Insert 1 suppository (10 mg total) into the rectum daily as needed for constipation   Patient not taking: Reported on 1/17/2021   bumetanide (BUMEX) 1 mg tablet   Yes No   Sig: Take 2 mg by mouth daily    diltiazem (CARDIZEM CD) 180 mg 24 hr capsule   No No   Sig: Take 1 capsule (180 mg total) by mouth daily   enoxaparin (LOVENOX) 40 mg/0 4 mL   Yes No   Sig: Inject 40 mg under the skin daily   magnesium oxide (MAG-OX) 400 mg   No No   Sig: Take 1 tablet (400 mg total) by mouth daily   metoprolol tartrate (LOPRESSOR) 50 mg tablet   No No   Sig: Take 1 tablet (50 mg total) by mouth 3 (three) times a day   pantoprazole (PROTONIX) 40 mg tablet   No No   Sig: Take 1 tablet (40 mg total) by mouth 2 (two) times a day before meals   Patient taking differently: Take 40 mg by mouth daily    sertraline (ZOLOFT) 100 mg tablet   Yes No   Sig: Take 100 mg by mouth daily   sertraline (ZOLOFT) 25 mg tablet   No No   Sig: Take 1 tablet (25 mg total) by mouth daily after dinner   Patient taking differently: Take 50 mg by mouth daily after dinner 75mg daily   traMADol (ULTRAM) 50 mg tablet  Outside Facility (Specify) Yes No   Sig: Take 50 mg by mouth every 6 (six) hours as needed for moderate pain      Facility-Administered Medications: None     Allergies:   Allergies   Allergen Reactions    Amiodarone      Developed Rash    Penicillins Rash     However, has subsequently tolerated Cefazolin and Cefepime ------------------------------------------------------------------------------------------------------------  Advance Directive and Living Will:      Power of :    POLST:    ------------------------------------------------------------------------------------------------------------  Care Time Delivered:   Upon my evaluation, this patient had a high probability of imminent or life-threatening deterioration due to hypoxia, respiratory failure, COVID, which required my direct attention, intervention, and personal management  I have personally provided 38 minutes (0745 to 1600) of critical care time, exclusive of procedures, teaching, family meetings, and any prior time recorded by providers other than myself  Ellis Pereira PA-C        Portions of the record may have been created with voice recognition software  Occasional wrong word or "sound a like" substitutions may have occurred due to the inherent limitations of voice recognition software    Read the chart carefully and recognize, using context, where substitutions have occurred

## 2021-02-02 ENCOUNTER — APPOINTMENT (INPATIENT)
Dept: VASCULAR ULTRASOUND | Facility: HOSPITAL | Age: 61
DRG: 130 | End: 2021-02-02
Payer: COMMERCIAL

## 2021-02-02 PROBLEM — Z86.73 HISTORY OF STROKE: Status: ACTIVE | Noted: 2021-02-02

## 2021-02-02 PROBLEM — R79.89 POSITIVE D DIMER: Status: ACTIVE | Noted: 2021-02-02

## 2021-02-02 LAB
ALBUMIN SERPL BCP-MCNC: 2.8 G/DL (ref 3.5–5)
ALP SERPL-CCNC: 56 U/L (ref 46–116)
ALT SERPL W P-5'-P-CCNC: 22 U/L (ref 12–78)
ANION GAP SERPL CALCULATED.3IONS-SCNC: 5 MMOL/L (ref 4–13)
APTT PPP: 210 SECONDS (ref 23–37)
APTT PPP: 31 SECONDS (ref 23–37)
APTT PPP: >210 SECONDS (ref 23–37)
ARTERIAL PATENCY WRIST A: YES
ARTERIAL PATENCY WRIST A: YES
AST SERPL W P-5'-P-CCNC: 15 U/L (ref 5–45)
ATRIAL RATE: 106 BPM
BASE EXCESS BLDA CALC-SCNC: 3.6 MMOL/L
BASE EXCESS BLDA CALC-SCNC: 5.7 MMOL/L
BASE EXCESS BLDA CALC-SCNC: 6.4 MMOL/L
BASOPHILS # BLD AUTO: 0.01 THOUSANDS/ΜL (ref 0–0.1)
BASOPHILS NFR BLD AUTO: 0 % (ref 0–1)
BILIRUB SERPL-MCNC: 0.51 MG/DL (ref 0.2–1)
BUN SERPL-MCNC: 64 MG/DL (ref 5–25)
CALCIUM ALBUM COR SERPL-MCNC: 9.1 MG/DL (ref 8.3–10.1)
CALCIUM SERPL-MCNC: 8.1 MG/DL (ref 8.3–10.1)
CHLORIDE SERPL-SCNC: 105 MMOL/L (ref 100–108)
CO2 SERPL-SCNC: 32 MMOL/L (ref 21–32)
CREAT SERPL-MCNC: 1.81 MG/DL (ref 0.6–1.3)
EOSINOPHIL # BLD AUTO: 0.02 THOUSAND/ΜL (ref 0–0.61)
EOSINOPHIL NFR BLD AUTO: 0 % (ref 0–6)
ERYTHROCYTE [DISTWIDTH] IN BLOOD BY AUTOMATED COUNT: 18.4 % (ref 11.6–15.1)
ERYTHROCYTE [DISTWIDTH] IN BLOOD BY AUTOMATED COUNT: 18.9 % (ref 11.6–15.1)
FIO2: 100 %
GFR SERPL CREATININE-BSD FRML MDRD: 39 ML/MIN/1.73SQ M
GLUCOSE SERPL-MCNC: 112 MG/DL (ref 65–140)
GLUCOSE SERPL-MCNC: 119 MG/DL (ref 65–140)
GLUCOSE SERPL-MCNC: 135 MG/DL (ref 65–140)
GLUCOSE SERPL-MCNC: 136 MG/DL (ref 65–140)
HCO3 BLDA-SCNC: 29.4 MMOL/L (ref 22–28)
HCO3 BLDA-SCNC: 30.8 MMOL/L (ref 22–28)
HCO3 BLDA-SCNC: 31 MMOL/L (ref 22–28)
HCT VFR BLD AUTO: 36.3 % (ref 36.5–49.3)
HCT VFR BLD AUTO: 40.7 % (ref 36.5–49.3)
HGB BLD-MCNC: 10.6 G/DL (ref 12–17)
HGB BLD-MCNC: 11.7 G/DL (ref 12–17)
HOROWITZ INDEX BLDA+IHG-RTO: 100 MM[HG]
IMM GRANULOCYTES # BLD AUTO: 0.19 THOUSAND/UL (ref 0–0.2)
IMM GRANULOCYTES NFR BLD AUTO: 2 % (ref 0–2)
INR PPP: 1.22 (ref 0.84–1.19)
LACTATE SERPL-SCNC: 1.2 MMOL/L (ref 0.5–2)
LYMPHOCYTES # BLD AUTO: 0.28 THOUSANDS/ΜL (ref 0.6–4.47)
LYMPHOCYTES NFR BLD AUTO: 3 % (ref 14–44)
MCH RBC QN AUTO: 23 PG (ref 26.8–34.3)
MCH RBC QN AUTO: 23 PG (ref 26.8–34.3)
MCHC RBC AUTO-ENTMCNC: 28.7 G/DL (ref 31.4–37.4)
MCHC RBC AUTO-ENTMCNC: 29.2 G/DL (ref 31.4–37.4)
MCV RBC AUTO: 79 FL (ref 82–98)
MCV RBC AUTO: 80 FL (ref 82–98)
MONOCYTES # BLD AUTO: 0.44 THOUSAND/ΜL (ref 0.17–1.22)
MONOCYTES NFR BLD AUTO: 4 % (ref 4–12)
NEUTROPHILS # BLD AUTO: 9.74 THOUSANDS/ΜL (ref 1.85–7.62)
NEUTS SEG NFR BLD AUTO: 91 % (ref 43–75)
NRBC BLD AUTO-RTO: 0 /100 WBCS
O2 CT BLDA-SCNC: 15.2 ML/DL (ref 16–23)
O2 CT BLDA-SCNC: 15.5 ML/DL (ref 16–23)
O2 CT BLDA-SCNC: 16.2 ML/DL (ref 16–23)
OXYHGB MFR BLDA: 92.2 % (ref 94–97)
OXYHGB MFR BLDA: 92.6 % (ref 94–97)
OXYHGB MFR BLDA: 94.4 % (ref 94–97)
P AXIS: 56 DEGREES
PCO2 BLDA: 44.9 MM HG (ref 36–44)
PCO2 BLDA: 47 MM HG (ref 36–44)
PCO2 BLDA: 49.5 MM HG (ref 36–44)
PEEP RESPIRATORY: 10 CM[H2O]
PH BLDA: 7.39 [PH] (ref 7.35–7.45)
PH BLDA: 7.43 [PH] (ref 7.35–7.45)
PH BLDA: 7.46 [PH] (ref 7.35–7.45)
PLATELET # BLD AUTO: 204 THOUSANDS/UL (ref 149–390)
PLATELET # BLD AUTO: 222 THOUSANDS/UL (ref 149–390)
PLOW: 10
PMV BLD AUTO: 11.2 FL (ref 8.9–12.7)
PMV BLD AUTO: 11.6 FL (ref 8.9–12.7)
PO2 BLDA: 60.2 MM HG (ref 75–129)
PO2 BLDA: 71.5 MM HG (ref 75–129)
PO2 BLDA: 86.1 MM HG (ref 75–129)
POTASSIUM SERPL-SCNC: 3.4 MMOL/L (ref 3.5–5.3)
PR INTERVAL: 194 MS
PROT SERPL-MCNC: 5.6 G/DL (ref 6.4–8.2)
PROTHROMBIN TIME: 15.5 SECONDS (ref 11.6–14.5)
QRS AXIS: 81 DEGREES
QRSD INTERVAL: 156 MS
QT INTERVAL: 392 MS
QTC INTERVAL: 520 MS
RBC # BLD AUTO: 4.61 MILLION/UL (ref 3.88–5.62)
RBC # BLD AUTO: 5.09 MILLION/UL (ref 3.88–5.62)
SODIUM SERPL-SCNC: 142 MMOL/L (ref 136–145)
SPECIMEN SOURCE: ABNORMAL
T WAVE AXIS: -89 DEGREES
THIGH: 30
VANCOMYCIN SERPL-MCNC: 19.4 UG/ML
VANCOMYCIN SERPL-MCNC: 25.7 UG/ML
VENT AC: 18
VENT APRV: 18
VENT- AC: AC
VENT- APRV: ABNORMAL
VENTRICULAR RATE: 106 BPM
VT SETTING VENT: 450 ML
WBC # BLD AUTO: 10.68 THOUSAND/UL (ref 4.31–10.16)
WBC # BLD AUTO: 13.36 THOUSAND/UL (ref 4.31–10.16)

## 2021-02-02 PROCEDURE — 94664 DEMO&/EVAL PT USE INHALER: CPT

## 2021-02-02 PROCEDURE — 99255 IP/OBS CONSLTJ NEW/EST HI 80: CPT | Performed by: PSYCHIATRY & NEUROLOGY

## 2021-02-02 PROCEDURE — 82947 ASSAY GLUCOSE BLOOD QUANT: CPT

## 2021-02-02 PROCEDURE — 82803 BLOOD GASES ANY COMBINATION: CPT

## 2021-02-02 PROCEDURE — 93010 ELECTROCARDIOGRAM REPORT: CPT | Performed by: INTERNAL MEDICINE

## 2021-02-02 PROCEDURE — 94150 VITAL CAPACITY TEST: CPT

## 2021-02-02 PROCEDURE — 80202 ASSAY OF VANCOMYCIN: CPT | Performed by: PHYSICIAN ASSISTANT

## 2021-02-02 PROCEDURE — 83605 ASSAY OF LACTIC ACID: CPT | Performed by: PHYSICIAN ASSISTANT

## 2021-02-02 PROCEDURE — 36600 WITHDRAWAL OF ARTERIAL BLOOD: CPT

## 2021-02-02 PROCEDURE — 94640 AIRWAY INHALATION TREATMENT: CPT

## 2021-02-02 PROCEDURE — 82805 BLOOD GASES W/O2 SATURATION: CPT | Performed by: PHYSICIAN ASSISTANT

## 2021-02-02 PROCEDURE — 82948 REAGENT STRIP/BLOOD GLUCOSE: CPT

## 2021-02-02 PROCEDURE — 94644 CONT INHLJ TX 1ST HOUR: CPT

## 2021-02-02 PROCEDURE — 84295 ASSAY OF SERUM SODIUM: CPT

## 2021-02-02 PROCEDURE — 85610 PROTHROMBIN TIME: CPT | Performed by: FAMILY MEDICINE

## 2021-02-02 PROCEDURE — 99291 CRITICAL CARE FIRST HOUR: CPT | Performed by: PHYSICIAN ASSISTANT

## 2021-02-02 PROCEDURE — 85014 HEMATOCRIT: CPT

## 2021-02-02 PROCEDURE — 85025 COMPLETE CBC W/AUTO DIFF WBC: CPT | Performed by: PHYSICIAN ASSISTANT

## 2021-02-02 PROCEDURE — 94762 N-INVAS EAR/PLS OXIMTRY CONT: CPT

## 2021-02-02 PROCEDURE — 82805 BLOOD GASES W/O2 SATURATION: CPT | Performed by: INTERNAL MEDICINE

## 2021-02-02 PROCEDURE — 94645 CONT INHLJ TX EACH ADDL HOUR: CPT

## 2021-02-02 PROCEDURE — 85730 THROMBOPLASTIN TIME PARTIAL: CPT | Performed by: FAMILY MEDICINE

## 2021-02-02 PROCEDURE — 99232 SBSQ HOSP IP/OBS MODERATE 35: CPT | Performed by: INTERNAL MEDICINE

## 2021-02-02 PROCEDURE — 94003 VENT MGMT INPAT SUBQ DAY: CPT

## 2021-02-02 PROCEDURE — 80053 COMPREHEN METABOLIC PANEL: CPT | Performed by: PHYSICIAN ASSISTANT

## 2021-02-02 PROCEDURE — 94760 N-INVAS EAR/PLS OXIMETRY 1: CPT

## 2021-02-02 PROCEDURE — 93970 EXTREMITY STUDY: CPT

## 2021-02-02 PROCEDURE — 99292 CRITICAL CARE ADDL 30 MIN: CPT | Performed by: INTERNAL MEDICINE

## 2021-02-02 PROCEDURE — 84132 ASSAY OF SERUM POTASSIUM: CPT

## 2021-02-02 PROCEDURE — 85027 COMPLETE CBC AUTOMATED: CPT | Performed by: FAMILY MEDICINE

## 2021-02-02 RX ORDER — HEPARIN SODIUM 1000 [USP'U]/ML
10000 INJECTION, SOLUTION INTRAVENOUS; SUBCUTANEOUS ONCE
Status: COMPLETED | OUTPATIENT
Start: 2021-02-02 | End: 2021-02-02

## 2021-02-02 RX ORDER — AMOXICILLIN 250 MG
1 CAPSULE ORAL
Status: DISCONTINUED | OUTPATIENT
Start: 2021-02-02 | End: 2021-02-06

## 2021-02-02 RX ORDER — HEPARIN SODIUM 1000 [USP'U]/ML
10000 INJECTION, SOLUTION INTRAVENOUS; SUBCUTANEOUS
Status: DISCONTINUED | OUTPATIENT
Start: 2021-02-02 | End: 2021-02-17

## 2021-02-02 RX ORDER — ACETAMINOPHEN 160 MG/5ML
650 SUSPENSION, ORAL (FINAL DOSE FORM) ORAL EVERY 4 HOURS PRN
Status: DISCONTINUED | OUTPATIENT
Start: 2021-02-02 | End: 2021-02-17

## 2021-02-02 RX ORDER — POTASSIUM CHLORIDE 14.9 MG/ML
20 INJECTION INTRAVENOUS ONCE
Status: COMPLETED | OUTPATIENT
Start: 2021-02-02 | End: 2021-02-02

## 2021-02-02 RX ORDER — POTASSIUM CHLORIDE 20MEQ/15ML
40 LIQUID (ML) ORAL ONCE
Status: COMPLETED | OUTPATIENT
Start: 2021-02-02 | End: 2021-02-02

## 2021-02-02 RX ORDER — FUROSEMIDE 10 MG/ML
60 INJECTION INTRAMUSCULAR; INTRAVENOUS ONCE
Status: COMPLETED | OUTPATIENT
Start: 2021-02-02 | End: 2021-02-02

## 2021-02-02 RX ORDER — HEPARIN SODIUM 10000 [USP'U]/100ML
3-30 INJECTION, SOLUTION INTRAVENOUS
Status: DISCONTINUED | OUTPATIENT
Start: 2021-02-02 | End: 2021-02-17

## 2021-02-02 RX ORDER — FAMOTIDINE 40 MG/5ML
20 POWDER, FOR SUSPENSION ORAL 2 TIMES DAILY
Status: DISCONTINUED | OUTPATIENT
Start: 2021-02-02 | End: 2021-02-06

## 2021-02-02 RX ORDER — HEPARIN SODIUM 1000 [USP'U]/ML
5000 INJECTION, SOLUTION INTRAVENOUS; SUBCUTANEOUS
Status: DISCONTINUED | OUTPATIENT
Start: 2021-02-02 | End: 2021-02-17

## 2021-02-02 RX ORDER — BUMETANIDE 1 MG/1
2 TABLET ORAL 2 TIMES DAILY
Status: DISCONTINUED | OUTPATIENT
Start: 2021-02-02 | End: 2021-02-03

## 2021-02-02 RX ORDER — ATORVASTATIN CALCIUM 40 MG/1
40 TABLET, FILM COATED ORAL
Status: DISCONTINUED | OUTPATIENT
Start: 2021-02-02 | End: 2021-03-04 | Stop reason: HOSPADM

## 2021-02-02 RX ORDER — METOPROLOL TARTRATE 50 MG/1
50 TABLET, FILM COATED ORAL 3 TIMES DAILY
Status: DISCONTINUED | OUTPATIENT
Start: 2021-02-02 | End: 2021-02-02

## 2021-02-02 RX ADMIN — CEFEPIME HYDROCHLORIDE 2000 MG: 2 INJECTION, POWDER, FOR SOLUTION INTRAVENOUS at 23:41

## 2021-02-02 RX ADMIN — Medication 2000 UNITS: at 09:47

## 2021-02-02 RX ADMIN — DESMOPRESSIN ACETATE 40 MG: 0.2 TABLET ORAL at 22:40

## 2021-02-02 RX ADMIN — MULTIVITAMIN 15 ML: LIQUID ORAL at 09:52

## 2021-02-02 RX ADMIN — POTASSIUM CHLORIDE 20 MEQ: 14.9 INJECTION, SOLUTION INTRAVENOUS at 10:08

## 2021-02-02 RX ADMIN — BUMETANIDE 2 MG: 1 TABLET ORAL at 17:57

## 2021-02-02 RX ADMIN — METRONIDAZOLE 500 MG: 500 INJECTION, SOLUTION INTRAVENOUS at 10:08

## 2021-02-02 RX ADMIN — BUMETANIDE 2 MG: 1 TABLET ORAL at 09:47

## 2021-02-02 RX ADMIN — LEVETIRACETAM 750 MG: 100 INJECTION, SOLUTION INTRAVENOUS at 20:27

## 2021-02-02 RX ADMIN — CHLORHEXIDINE GLUCONATE 0.12% ORAL RINSE 15 ML: 1.2 LIQUID ORAL at 09:46

## 2021-02-02 RX ADMIN — FAMOTIDINE 20 MG: 40 POWDER, FOR SUSPENSION ORAL at 09:47

## 2021-02-02 RX ADMIN — PROPOFOL 35 MCG/KG/MIN: 10 INJECTION, EMULSION INTRAVENOUS at 06:00

## 2021-02-02 RX ADMIN — HEPARIN SODIUM 18 UNITS/KG/HR: 10000 INJECTION, SOLUTION INTRAVENOUS at 13:31

## 2021-02-02 RX ADMIN — EPOPROSTENOL 50 NG/KG/MIN: 1.5 INJECTION, POWDER, LYOPHILIZED, FOR SOLUTION INTRAVENOUS at 09:43

## 2021-02-02 RX ADMIN — DOCUSATE SODIUM 100 MG: 100 CAPSULE, LIQUID FILLED ORAL at 17:57

## 2021-02-02 RX ADMIN — POLYETHYLENE GLYCOL 3350 17 G: 17 POWDER, FOR SOLUTION ORAL at 22:40

## 2021-02-02 RX ADMIN — PROPOFOL 25 MCG/KG/MIN: 10 INJECTION, EMULSION INTRAVENOUS at 10:11

## 2021-02-02 RX ADMIN — CEFEPIME HYDROCHLORIDE 2000 MG: 2 INJECTION, POWDER, FOR SOLUTION INTRAVENOUS at 10:00

## 2021-02-02 RX ADMIN — CHLORHEXIDINE GLUCONATE 0.12% ORAL RINSE 15 ML: 1.2 LIQUID ORAL at 22:40

## 2021-02-02 RX ADMIN — DOCUSATE SODIUM AND SENNOSIDES 1 TABLET: 8.6; 5 TABLET ORAL at 22:40

## 2021-02-02 RX ADMIN — POTASSIUM CHLORIDE 40 MEQ: 20 SOLUTION ORAL at 09:00

## 2021-02-02 RX ADMIN — LEVETIRACETAM 1000 MG: 100 INJECTION, SOLUTION INTRAVENOUS at 09:00

## 2021-02-02 RX ADMIN — PROPOFOL 35 MCG/KG/MIN: 10 INJECTION, EMULSION INTRAVENOUS at 01:09

## 2021-02-02 RX ADMIN — EPOPROSTENOL 12.5 NG/KG/MIN: 1.5 INJECTION, POWDER, LYOPHILIZED, FOR SOLUTION INTRAVENOUS at 18:35

## 2021-02-02 RX ADMIN — HEPARIN SODIUM 10000 UNITS: 1000 INJECTION INTRAVENOUS; SUBCUTANEOUS at 13:31

## 2021-02-02 RX ADMIN — FAMOTIDINE 20 MG: 40 POWDER, FOR SUSPENSION ORAL at 17:57

## 2021-02-02 RX ADMIN — PROPOFOL 20 MCG/KG/MIN: 10 INJECTION, EMULSION INTRAVENOUS at 19:55

## 2021-02-02 RX ADMIN — ENOXAPARIN SODIUM 60 MG: 60 INJECTION SUBCUTANEOUS at 09:47

## 2021-02-02 RX ADMIN — METRONIDAZOLE 500 MG: 500 INJECTION, SOLUTION INTRAVENOUS at 17:58

## 2021-02-02 RX ADMIN — VANCOMYCIN HYDROCHLORIDE 1750 MG: 5 INJECTION, POWDER, LYOPHILIZED, FOR SOLUTION INTRAVENOUS at 14:00

## 2021-02-02 RX ADMIN — SERTRALINE HYDROCHLORIDE 125 MG: 50 TABLET ORAL at 09:48

## 2021-02-02 RX ADMIN — POLYETHYLENE GLYCOL 3350 17 G: 17 POWDER, FOR SOLUTION ORAL at 09:47

## 2021-02-02 RX ADMIN — DOCUSATE SODIUM 100 MG: 100 CAPSULE, LIQUID FILLED ORAL at 09:48

## 2021-02-02 RX ADMIN — DEXAMETHASONE SODIUM PHOSPHATE 6 MG: 4 INJECTION, SOLUTION INTRA-ARTICULAR; INTRALESIONAL; INTRAMUSCULAR; INTRAVENOUS; SOFT TISSUE at 09:51

## 2021-02-02 RX ADMIN — FUROSEMIDE 60 MG: 10 INJECTION, SOLUTION INTRAMUSCULAR; INTRAVENOUS at 09:00

## 2021-02-02 RX ADMIN — POTASSIUM CHLORIDE 40 MEQ: 20 SOLUTION ORAL at 05:54

## 2021-02-02 RX ADMIN — METRONIDAZOLE 500 MG: 500 INJECTION, SOLUTION INTRAVENOUS at 01:52

## 2021-02-02 NOTE — ASSESSMENT & PLAN NOTE
· Confirmed positive 1/17  · Continue Decadron changed to 4mg/daily   · VC/Zinc completed   Continue MV  · Continue statin  · Continue pepcid   · Actemra given 1/28  · Conv Plasma 1/17  · Cont Heparin drip  · Not candidate for remdesivir  · Cont Ertapenum for superimposed ESBL E choli bacterial pneumonia

## 2021-02-02 NOTE — QUICK NOTE
I spoke to patient's wife Santo Stark  She is aware that patient was intubated as he was having seizures  He is on IV propofol and pressure did drop likely due to sedation  He is on low-dose norepinephrine through peripheral line  I did request permission for central venous line I explained procedure risks to her  She was agreeable to the procedure

## 2021-02-02 NOTE — ASSESSMENT & PLAN NOTE
· Patient does not have a seizure history but does have history of CVA in the past  · Neurology consulted and appreciate input, have signed off at this time  · Intubated 2/1 for suspected seizure activity   · Loaded with 2G Keppra and continued on Keppra 750mg BID   · EEG shows no seizure activity

## 2021-02-02 NOTE — ASSESSMENT & PLAN NOTE
· SLP following and had recommend pureed diet prior to intubation  · Will need to reassess after extubation

## 2021-02-02 NOTE — ASSESSMENT & PLAN NOTE
· Confirmed positive 1/17  · Continue Decadron 6mg/daily   · VC/Zinc completed  Continue MV  · Continue statin  · Continue pepcid   · Lovenox 60mg/Q12 secondary to BMI >50  · Received convalescent plasma 1/17  · Not candidate for remdesivir  · Broad spectrum antibiotics started yesterday for concern for superimposed bacterial pneumonia  PCT normal yesterday   If normal again today, will DC abx

## 2021-02-02 NOTE — ASSESSMENT & PLAN NOTE
- Patient has had multiple prior strokes with residual left UE and bilateral LE weakness  - Patient is maintained on aspirin 81 mg and Lipitor 40 mg at home  Currently on Eliquis 2 5 mg b i d  due to elevated D-dimer in the setting of COVID-19 infection  Management as per primary service    - Strokes thought the be secondary to atrial fibrillation and previous contraindication for anticoagulation due to prior GI bleeds  - Patient previously followed with Dr Jennifer Han in the outpatient neurovascular clinic  - CT head on 2/3/2021 negative for any acute intracranial abnormalities

## 2021-02-02 NOTE — PROGRESS NOTES
Critical Care Services- Interval Progress Note   Hayden Cardenas 63 y o  male MRN: [id number]  Unit/Bed#: ICU 08 Encounter: [id number]  Assessment and Plan  Patient is a 62 y/o M whom was originally admitted to [address] [place] [address] on 1/17 as a transfer from [facility] ER 2/2 Respiratory distress and altered mental status  Patient is a resident of [address] whom received his first COVID vaccine on 1/8  Patient tested on a daily basis per nursing home protocol and subsequently COVID positive on 1/16  Patient intubated at [facility] secondary to Acute Hypoxic / Hypercarbic Respiratory Failure  Patient subsequently extubated on 1/18  Patient was transferred back to the [facility] service as patient with increased work of breathing as well as FiO2 requirements  Patient was placed on BiPAP with improvement    This evening approximately 1830 hours advised by Primary RN, patient appearing to be experiencing Active Seizure  No noted prior Seizure history  In review of EMR, patient did have a prolonged hospitalization in January 2019 with Septic and Cardiogenic Shock 2/2 gram positive bacteremia, Respiratory failure which he required dialysis for approx 1yr post hospitalization  Patient also noted to have supped a stroke at that time and has been in a nursing facility since         Diagnosis: Tonic Clonic Seizure  o Plan: Administered 2mg Ativan IV Stat without seizure abatement  o Continue telemetry and hemodynamic monitoring  o Unable to protect airway, patient emergently intubated by Elie Vital PA-C (See separate procedure note)  o Initially ativan, propofol, and succ were pushed through R Arm Peripheral IV without abatement of seizure  o Etomidate was administered through L Arm midline with seizure cessation  o Place on MV support with continual SpO2 monitoring  o In lieu of seizure obtain stat Head CT  o Initially propofol initiated post seizure / intubation  o Blood pressures have been labile 2/2 medication administration  o Administer 500cc fluid bolus to maintain goal MAP >65mm/Hg  o BP remains labile despite volume resuscitation will start on peripheral levophed  o Attempt to establish additional peripheral IV access   o In setting of seizure ideally would obtain continual EEG monitoring; however in setting of severe weather will need to hold on transfer  Will continue to monitor for additional seizures / propofol as tolerated  o Will upgrade to the [facility] of [facility] Ames Lundborg   Diagnosis: Acute Respiratory Insufficiency 2/2 Tonic clonic seizure  o Plan: See above  o Obtain ABG 1h post intubation    Above Assessment and Plan discussed with Dr Vita Lehman whom is in agreement as outlined above  Additionally I did contact patients wife providing update / intubation / MV support and possible need for [employer]  Dr Vita Lehman will contact wife to obtain consent      Upon my evaluation, this patient had a high probability of imminent or life-threatening deterioration due to Seizure / Respiratory failure, which required my direct attention, intervention, and personal management  I have personally provided 60 minutes (1825 to 1925) on 2/1/2021 of critical care time, exclusive of procedures, teaching, family meetings, and any prior time recorded by providers other than myself  Time includes review of laboratory data, review of imaging/radiology results, monitoring for potential decompensation    Interventions were performed as documented above    -------------------------------------------------------------------------------------------------------------------------------------  Hemodynamic Monitoring:  Vital Signs:   HR: 96  BP: 160s  RR: 14  SpO2: 82 on BiPAP   Cardiac Monitoring:   Telemetry rhythm: NSR rate in 90s    Laboratory   Results from last 7 days   Lab Units 02/01/21  1058 01/30/21  0629 01/29/21  0552 01/28/21  0512 01/27/21  0610 01/26/21  0025   WBC Thousand/uL 12 07* 6 58 4  68 4 31 4 37 6 13   HEMOGLOBIN g/dL 11 9* 11 1* 9 9* 11 2* 10 7* 11 0*   HEMATOCRIT % 41 3 39 3 34 0* 38 1 36 7 37 1   PLATELETS Thousands/uL 222 195 173 184 180 165   NEUTROS PCT %  --  91* 82* 85* 86* 90*   MONOS PCT %  --  3* 6 7 9 6   MONO PCT % 2*  --   --   --   --   --      Results from last 7 days   Lab Units 02/01/21  1058 02/01/21  0541 01/31/21  0525 01/30/21  0629 01/29/21  0552 01/28/21  0512 01/27/21  0611  01/26/21  0025   SODIUM mmol/L 145 145 143 141 139 140 148*   < > 146*   POTASSIUM mmol/L 4 3 4 2 4 1 4 2 3 3* 3 8 3 6   < > 3 5   CHLORIDE mmol/L 105 105 105 104 106 105 112*   < > 108   CO2 mmol/L 36* 34* 32 31 27 29 30   < > 29   ANION GAP mmol/L 4 6 6 6 6 6 6   < > 9   BUN mg/dL 68* 64* 58* 57* 53* 66* 70*   < > 74*   CREATININE mg/dL 2 05* 1 94* 2 10* 2 12* 1 92* 2 25* 2 40*   < > 2 71*   CALCIUM mg/dL 8 5 8 3 7 9* 8 0* 7 0* 8 6 8 7   < > 8 7   GLUCOSE RANDOM mg/dL 104 108 124 128 141* 126 106   < > 101   ALT U/L 23  --   --  27 21 28  --   --  33   AST U/L 14  --   --  21 17 17  --   --  21   ALK PHOS U/L 60  --   --  58 45* 55  --   --  62   ALBUMIN g/dL 3 3*  --   --  2 3* 1 9* 2 4*  --   --  2 5*   TOTAL BILIRUBIN mg/dL 0 64  --   --  0 38 0 44 0 56  --   --  0 54    < > = values in this interval not displayed  Results from last 7 days   Lab Units 01/27/21  0611   MAGNESIUM mg/dL 2 8*           Results from last 7 days   Lab Units 02/01/21  1058   TROPONIN I ng/mL 0 03         Results from last 7 days   Lab Units 02/01/21  1058   PROCALCITONIN ng/ml 0 13         Diagnostic Imaging / Data: I have personally reviewed pertinent reports     and I have personally reviewed pertinent films in PACS    Medications:  Current Facility-Administered Medications   Medication Dose Route Frequency    acetaminophen (TYLENOL) tablet 650 mg  650 mg Oral Q6H PRN    aspirin (ECOTRIN LOW STRENGTH) EC tablet 81 mg  81 mg Oral Daily    atorvastatin (LIPITOR) tablet 40 mg  40 mg Oral HS    bisacodyl (DULCOLAX) rectal suppository 10 mg  10 mg Rectal Daily PRN    bumetanide (BUMEX) tablet 2 mg  2 mg Oral BID    cefepime (MAXIPIME) 2,000 mg in dextrose 5 % 50 mL IVPB  2,000 mg Intravenous Q12H    chlorhexidine (PERIDEX) 0 12 % oral rinse 15 mL  15 mL Swish [employer] Spit Q12H [address]    chlorhexidine (PERIDEX) 0 12 % oral rinse 15 mL  15 mL Swish & Spit Q12H [address]    cholecalciferol (VITAMIN D3) tablet 2,000 Units  2,000 Units Per NG Tube Daily    [START ON 2/2/2021] dexamethasone (DECADRON) injection 6 mg  6 mg Intravenous Daily    diltiazem (CARDIZEM SR) 12 hr capsule 90 mg  90 mg Oral Q12H [address]    docusate sodium (COLACE) capsule 100 mg  100 mg Oral BID    enoxaparin (LOVENOX) subcutaneous injection 60 mg  60 mg Subcutaneous Q12H [address]    etomidate (AMIDATE) 2 mg/mL injection 10 mg  10 mg Intravenous Once    famotidine (PEPCID) injection 20 mg  20 mg Intravenous Q12H [address]    LORazepam (ATIVAN) injection 2 mg  2 mg Intravenous Once    melatonin tablet 3 mg  3 mg Oral HS PRN    methocarbamol (ROBAXIN) tablet 500 mg  500 mg Oral Q8H PRN    metoprolol (LOPRESSOR) injection 5 mg  5 mg Intravenous Q6H PRN    metoprolol tartrate (LOPRESSOR) tablet 50 mg  50 mg Oral TID    metroNIDAZOLE (FLAGYL) IVPB (premix) 500 mg 100 mL  500 mg Intravenous Q8H    multivitamin with iron-minerals liquid 15 mL  15 mL Per NG Tube Daily    pantoprazole (PROTONIX) EC tablet 40 mg  40 mg Oral Early Morning    polyethylene glycol (MIRALAX) packet 17 g  17 g Oral BID    propofol (DIPRIVAN) 1000 mg in 100 mL infusion (premix)  5-50 mcg/kg/min Intravenous Titrated    senna-docusate sodium (SENOKOT S) 8 6-50 mg per tablet 1 tablet  1 tablet Oral HS    sertraline (ZOLOFT) tablet 125 mg  125 mg Oral Daily    sodium chloride 0 9 % bolus 1,000 mL  1,000 mL Intravenous Once    traMADol (ULTRAM) tablet 50 mg  50 mg Oral Once    vancomycin (VANCOCIN) 1,750 mg in sodium chloride 0 9 % 500 mL IVPB  15 mg/kg (Adjusted) Intravenous Daily PRN    vecuronium (NORCURON) injection 10 mg  10 mg Intravenous Once       SIGNATURE: Shaila Sanchez PA-C    Portions of the record may have been created with voice recognition software  Occasional wrong word or "sound a like" substitutions may have occurred due to the inherent limitations of voice recognition software    Read the chart carefully and recognize, using context, where substitutions have occurred

## 2021-02-02 NOTE — PROGRESS NOTES
Critical Care Progress Note - Hermann Roque 1960, 63 y o  male MRN: [id number]    Unit/Bed#: ICU 08 Encounter: [id number]    Primary Care Provider: Alyssa Lyles DO   Date and time admitted to hospital: 1/17/2021  4:16 PM        * Acute respiratory failure with hypoxia and hypercarbia (HCC)  Assessment & Plan  · Secondary to COVID-19 pneumonia   · Patient reintubated yesterday afternoon for airway protectin following seizure activity   · Previously he had been intubated on 1/17 and extubated on 1/18  · Patient transitioned to APRV mode of ventilation without reverse I:E ratio  ABG this AM with P/F ration of 60 despite PEEP 10  Will attempt to trial AC/VC with ARDS net settings  He was on Bipap prior to intubation for worsening hypoxia and increased WOB   · Titrate FiO2 for SpO2 > 90%  · May benefit from veletri   · Can paralyze if necessary   · Covid therapy per severe pathway protocol       Acute metabolic encephalopathy  Assessment & Plan  · Secondary to hypoxia/hypercapnia and history of CVA with residual Left-sided weakness and now seizure activity   · Attempt to wean sedation and monitor for seizure activity   · Neurology consulted   · Will likely benefit from EEG- if he shows ongoing signs of seizure activity or status on EEG, he will require transfer to [facility] for continuous vEEG   · CAM-ICU   · Continue propfol  · Continue Zoloft    Pneumonia due to COVID-19 virus  Assessment & Plan  · Confirmed positive 1/17  · Continue Decadron 6mg/daily   · VC/Zinc completed  Continue MV  · Continue statin  · Continue pepcid   · Lovenox 60mg/Q12 secondary to BMI >50  · Received convalescent plasma 1/17  · Not candidate for remdesivir  · Broad spectrum antibiotics started yesterday for concern for superimposed bacterial pneumonia  PCT normal  Abx discontinued     Atrial fibrillation (HCC)  Assessment & Plan  · Currently in sinus rhythm      · Not on AC at home secondary to history of GI bleed  · Will start heparin gtt secondary to elevated D dimer today   · Continue telemetry  · Holding cardizem secondary to hypotension  · If BP tolerated will restart lopressor   · [id number] 3    Essential hypertension  Assessment & Plan  · Continue bumex  · Patient received a dose of lasix yesterday for presumed volume overload   · Was started on low dose vasopressors yesterday for hypotension following intubation and sedation  Remain on levophed at 1mcg/min      Hyperlipidemia  Assessment & Plan  · Continue atorvastatin     Morbid obesity   Assessment & Plan  · Likely contributing to OHS    Positive D dimer in a COVID postitive patient   Assessment & Plan  · Concern for PE/DVT  · Will check LE duplex  · Start heparin gtt  · Hold on CT imaging given elevated creatine    Seizure ([address] )  Assessment & Plan  · Patient does not have a seizure history but does have history of CVA in the past  · Neurology consulted and appreciate input  · Intubated yesterday for seizure activity   · Loaded with 2G Keppra and started on Keppra BID   · Check EEG  · If evidence of status or ongoing seizure activity, will need transfer to [facility] for continuous vEEG     Acute kidney injury superimposed on CKD [facility])  Assessment & Plan  Lab Results   Component Value Date    EGFR 39 02/02/2021    EGFR 34 02/01/2021    EGFR 36 02/01/2021    CREATININE 1 81 (H) 02/02/2021    CREATININE 2 05 (H) 02/01/2021    CREATININE 1 94 (H) 02/01/2021     · Creatine improved to 1 8 today following additional dose of lasix  · May benefit from more lasix today and continue home bumex     Depression  Assessment & Plan  · Continue home Zoloft    Dysphagia  Assessment & Plan  · SLP following and had recommend pureed diet prior to intubation  · Will start TF today       ----------------------------------------------------------------------------------------  HPI/24hr events: Patient had increasing Fi02 requirements throughout the day yesterday and was transferred to [facility] and placed on Bipap    He had seizure activity around 1830 and was intubated for airway protection and hypoxia  He was started on Keppra per neurology    He had ongoing hypoxia overnight and was placed PC ventilatory settings     Disposition: Continue Critical Care   Code Status: Level 1 - Full Code  ---------------------------------------------------------------------------------------  SUBJECTIVE  Patient intubated and sedated    Review of Systems  Review of systems was unable to be performed secondary to critical illness, intubated  ---------------------------------------------------------------------------------------  OBJECTIVE    Vitals   Vitals:    21 0957 21 1000 21 1009 21 1016   BP: 100/58 98/54  100/59   Pulse: 65 63  66   Resp: 21 19  20   Temp: (!) 96 6 °F (35 9 °C) (!) 96 6 °F (35 9 °C)  (!) 96 6 °F (35 9 °C)   TempSrc:       SpO2: 97% 97% 97% 98%   Weight:       Height:         Temp (24hrs), Av 9 °F (36 1 °C), Min:96 3 °F (35 7 °C), Max:98 4 °F (36 9 °C)  Current: Temperature: (!) 96 6 °F (35 9 °C)          Respiratory:  SpO2: SpO2: 98 %, SpO2 Activity: SpO2 Activity: At Rest, SpO2 Device: O2 Device: Ventilator  Nasal Cannula O2 Flow Rate (L/min): 50 L/min    Invasive/non-invasive ventilation settings   Respiratory    Lab Data (Last 4 hours)       1002            pH, Arterial       7 391           pCO2, Arterial       49 5           pO2, Arterial       86 1           HCO3, Arterial       29 4           Base Excess, Arterial       3 6                O2/Vent Data        0811   Most Recent        T High (S) (sec) 1        Patient safety screen outcome: Failed        P High (cmH2O) (cm) 30        P Low (cmH2O) (cm) 10                    Physical Exam  Vitals signs reviewed  Constitutional:       Appearance: He is ill-appearing  HENT:      Head: Normocephalic  Mouth/Throat:      Mouth: Mucous membranes are moist    Eyes:      Pupils: Pupils are equal, round, and reactive to light  Cardiovascular:      Comments: Paced rhythm   Pulmonary:      Comments: Mechanical ventilation   Breathing over set RR  Abdominal:      Palpations: Abdomen is soft  Comments: Obese   Musculoskeletal: Normal range of motion  Skin:     General: Skin is warm  Neurological:      Comments:  Will wake up and follow commands off sedation         Laboratory and Diagnostics:  Results from last 7 days   Lab Units 02/02/21  0442 02/01/21  1058 01/30/21  0629 01/29/21  0552 01/28/21  0512 01/27/21  0610   WBC Thousand/uL 10 68* 12 07* 6 58 4 68 4 31 4 37   HEMOGLOBIN g/dL 10 6* 11 9* 11 1* 9 9* 11 2* 10 7*   HEMATOCRIT % 36 3* 41 3 39 3 34 0* 38 1 36 7   PLATELETS Thousands/uL 204 222 195 173 184 180   NEUTROS PCT % 91*  --  91* 82* 85* 86*   MONOS PCT % 4  --  3* 6 7 9   MONO PCT %  --  2*  --   --   --   --      Results from last 7 days   Lab Units 02/02/21  0442 02/01/21  1058 02/01/21  0541 01/31/21  0525 01/30/21  0629 01/29/21  0552 01/28/21  0512   SODIUM mmol/L 142 145 145 143 141 139 140   POTASSIUM mmol/L 3 4* 4 3 4 2 4 1 4 2 3 3* 3 8   CHLORIDE mmol/L 105 105 105 105 104 106 105   CO2 mmol/L 32 36* 34* 32 31 27 29   ANION GAP mmol/L 5 4 6 6 6 6 6   BUN mg/dL 64* 68* 64* 58* 57* 53* 66*   CREATININE mg/dL 1 81* 2 05* 1 94* 2 10* 2 12* 1 92* 2 25*   CALCIUM mg/dL 8 1* 8 5 8 3 7 9* 8 0* 7 0* 8 6   GLUCOSE RANDOM mg/dL 135 104 108 124 128 141* 126   ALT U/L 22 23  --   --  27 21 28   AST U/L 15 14  --   --  21 17 17   ALK PHOS U/L 56 60  --   --  58 45* 55   ALBUMIN g/dL 2 8* 3 3*  --   --  2 3* 1 9* 2 4*   TOTAL BILIRUBIN mg/dL 0 51 0 64  --   --  0 38 0 44 0 56     Results from last 7 days   Lab Units 01/27/21  0611   MAGNESIUM mg/dL 2 8*           Results from last 7 days   Lab Units 02/01/21  1058   TROPONIN I ng/mL 0 03     Results from last 7 days   Lab Units 02/02/21  0442   LACTIC ACID mmol/L 1 2     ABG:  Results from last 7 days   Lab Units 02/02/21  1002   PH ART  7 391   PCO2 ART mm Hg 49 5*   PO2 ART mm Hg 86 1   HCO3 ART mmol/L 29 4*   BASE EXC ART mmol/L 3 6   ABG SOURCE  Artery     VBG:  Results from last 7 days   Lab Units 02/02/21  1002   ABG SOURCE  Artery     Results from last 7 days   Lab Units 02/01/21  1058   PROCALCITONIN ng/ml 0 13       Micro  Results from last 7 days   Lab Units 02/01/21  1208 02/01/21  1200   BLOOD CULTURE  Received in Microbiology Lab  Culture in Progress  Received in Microbiology Lab  Culture in Progress  EKG: Paced  Imaging: I have personally reviewed pertinent reports  and I have personally reviewed pertinent films in PACS    Intake and Output  I/O       01/31 0701 - 02/01 0700 02/01 0701 - 02/02 0700 02/02 0701 - 02/03 0700    P  O  960 0     I V  (mL/kg)  1009 7 (6)     NG/GT  210     IV Piggyback  1250     Total Intake(mL/kg) 960 (5 2) 2469 7 (14 7)     Urine (mL/kg/hr) 1000 (0 2) 2600 (0 6)     Total Output 1000 2600     Net -40 -130 3                  Height and Weights   Height: 5' 11" (180 3 cm)  IBW: 75 3 kg  Body mass index is 51 6 kg/m²  Weight (last 2 days)     Date/Time   Weight    02/01/21 1019   (!) 168 (369 93)                Nutrition       Diet Orders   (From admission, onward)             Start     Ordered    02/01/21 1024  Diet NPO  Diet effective now     Question Answer Comment   Diet Type NPO    RD to adjust diet per protocol?  Yes        02/01/21 1025    01/17/21 1719  Room Service  Once     Question:  Type of Service  Answer:  Room Service- Not Appropriate    01/17/21 1718                  Active Medications  Scheduled Meds:  Current Facility-Administered Medications   Medication Dose Route Frequency Provider Last Rate    acetaminophen  650 mg Per NG Tube Q4H PRN Devoria High, PA-C      atorvastatin  40 mg Per NG Tube HS Devoria High, PA-C      bisacodyl  10 mg Rectal Daily PRN Devoria High, PA-C      bumetanide  2 mg Per NG Tube BID Devoria High, PA-C      cefepime  2,000 mg Intravenous Q12H Devoria High, PA-C 2,000 mg (02/02/21 1000)    chlorhexidine  15 mL [facility] [place]      cholecalciferol  2,000 Units Per NG Tube Daily CECILE James-ANA      dexamethasone  6 mg Intravenous Daily CECILE James-C      docusate sodium  100 mg Oral BID Angelina John, PA-C      epoprostenol (VELETRI) inhalation solution [id number] ng/mL  6 25-50 ng/kg/min (Ideal) Inhalation Titrated Patsi Blades PA-C 50 ng/kg/min (02/02/21 0943)    famotidine  20 mg Per NG Tube BID Angelina John PA-C      heparin (porcine)  3-30 Units/kg/hr (Order-Specific) Intravenous Titrated Dorette Splinter, DO      heparin (porcine)  10,000 Units Intravenous Once Dorette Splinter, DO      heparin (porcine)  10,000 Units Intravenous Q1H PRN Dorette Splinter, DO      heparin (porcine)  5,000 Units Intravenous Q1H PRN Dorette Splinter, DO      levETIRAcetam  1,000 mg Intravenous Q12H [address] CECILE James-C 1,000 mg (02/02/21 0900)    methocarbamol  500 mg Oral Q8H PRN Angelina John PA-C      metoprolol  5 mg Intravenous Q6H PRN Angelina John, PA-C      metroNIDAZOLE  500 mg Intravenous Q8H Angelina John, PA-C 500 mg (02/02/21 1008)    multivitamin with iron-minerals  15 mL Per NG Tube Daily CECILE James-C      norepinephrine  1-30 mcg/min Intravenous Titrated Angelina John, PA-C 1 mcg/min (02/02/21 0418)    polyethylene glycol  17 g Oral BID Angelina John PA-C      propofol  5-50 mcg/kg/min Intravenous Titrated Angelina John, PA-C 25 mcg/kg/min (02/02/21 1011)    senna-docusate sodium  1 tablet Per NG Tube HS Angelina John PA-C      sertraline  125 mg Per NG Tube Daily Angelina John, PA-C      sodium chloride  1,000 mL Intravenous Once Angelina John PA-C      traMADol  50 mg Oral Once Angelina John PA-C      vancomycin  15 mg/kg (Adjusted) Intravenous Daily PRN Angelina John PA-C       Continuous Infusions:  epoprostenol (VELETRI) inhalation solution 98228 ng/mL, 6 25-50 ng/kg/min (Ideal), Last Rate: 50 ng/kg/min (02/02/21 0943)  heparin (porcine), 3-30 Units/kg/hr (Order-Specific)  norepinephrine, 1-30 mcg/min, Last Rate: 1 mcg/min (02/02/21 0418)  propofol, 5-50 mcg/kg/min, Last Rate: 25 mcg/kg/min (02/02/21 1011)      PRN Meds:   acetaminophen, 650 mg, Q4H PRN  bisacodyl, 10 mg, Daily PRN  heparin (porcine), 10,000 Units, Q1H PRN  heparin (porcine), 5,000 Units, Q1H PRN  methocarbamol, 500 mg, Q8H PRN  metoprolol, 5 mg, Q6H PRN  vancomycin, 15 mg/kg (Adjusted), Daily PRN        Invasive Devices Review  Invasive Devices     Peripheral Intravenous Line            Long-Dwell Peripheral IV (Midline) 84/22/21 Left Cephalic 8 days    Peripheral IV 02/01/21 Right Forearm less than 1 day          Drain            Urethral Catheter Straight-tip 16 Fr  16 days    NG/OG/Enteral Tube Orogastric less than 1 day          Airway            ETT  Cuffed; Hi-Lo 8 mm less than 1 day                Rationale for remaining devices: Continue midline, ETT, bo   ---------------------------------------------------------------------------------------  Advance Directive and Living Will:      Power of :    POLST:    ---------------------------------------------------------------------------------------  Care Time Delivered:   Upon my evaluation, this patient had a high probability of imminent or life-threatening deterioration due to hypoxic respiratory failure, seizure, which required my direct attention, intervention, and personal management  I have personally provided 35 minutes ([phone] to 0825) of critical care time, exclusive of procedures, teaching, family meetings, and any prior time recorded by providers other than myself  Spring Grissom PA-C      Portions of the record may have been created with voice recognition software  Occasional wrong word or "sound a like" substitutions may have occurred due to the inherent limitations of voice recognition software    Read the chart carefully and recognize, using context, where substitutions have occurred

## 2021-02-02 NOTE — CONSULTS
Consultation - Neurology   Konrad De Jesus 63 y o  male MRN: [id number]  Unit/Bed#: ICU 08 Encounter: [id number]      Assessment/Plan     Seizure [facility])  Tung Dobson is a 63 y o  male with multiple prior strokes with residual left arm weakness and bilateral LE weakness, atrial fibrillation not on anticoagulation due to history of GI bleeding, HTN, HLD, and obesity who presented to [facility] ED on 1/17/2021 from [address] due to respiratory distress, fever, AMS, and COVID positive status  Due to hypercapnia and hypoxia, patient was emergently intubated and transferred to [facility] ICU for further management  Patient was successfully extubated on 1/18 and transferred to the floor, however required intermittent BiPAP and high flow nasal cannula due to low O2 sats  Yesterday (2/1/2021), patient's O2 sats were in the mid 80s, so patient was transferred back to the ICU  He was started on cefepime and Flagyl due to concern for pneumonia  Last night, patient had witnessed tonic-clonic seizure activity, was intubated for airway protection, and received Ativan 2 mg, propofol, succinylcholine, and etomidate with resolution in seizure after approximately 9 minutes  Patient was loaded with Keppra 2 g and started on maintenance Keppra 1 g BID  CT head showed stable encephalomalacia in the bilateral posterior parietal lobes, left anterior frontal lobe, and left cerebellum, but no hemorrhage or acute intracranial abnormalities  No known seizure history  Etiology for new onset seizure likely due to having a lowered seizure threshold from multiple prior strokes, current COVID-19 infection, intermittent hypoxia, and recently starting cefepime      Plan:  - Repeat CT head tomorrow morning  - Routine EEG pending  - Decreased Keppra from 1000 mg Q12 hours to 750 mg Q12 hours due to renal function  - Initial concern for bacteremia and started on vancomycin, but per ID mixed contaminants  Patient is now on cefepime and Flagyl  - If patient has another seizure, recommend trying to switch to a different antibiotic other than cefepime or Flagyl due these medications lowering seizure threshold  - Administer Ativan prn prolonged seizure-like activity    - Wean propofol gtt as able  - Currently intubated; wean as able per critical care team  - Continue seizure precautions   - Continue to monitor for seizure; if there is concern for status or if patient has another seizure, please reach out to Neurology and patient will likely need transfer to [facility] for video EEG monitoring  - Continue telemetry  - Patient does not drive; currently living at [address] [place] [address]  - Frequent neuro checks  Continue to monitor and notify Neurology with any changes  - Medical management and supportive care per primary team   Correction of any metabolic or infectious disturbances   - Follow up with outpatient epilepsy team  Office will call to schedule an appointment  Results:  - CT head:  · Stable areas of encephalomalacia in the bilateral posterior parietal lobes, left anterior frontal lobe and left cerebellar hemisphere is seen  There is no evidence of acute intracranial hemorrhage    There is no mass effect or midline shift   - COVID-19 positive (1/17/2021)    * Acute respiratory failure with hypoxia and hypercarbia (HCC)  Assessment & Plan  - Likely secondary to COVID-19 infection  - Patient initially intubated on 1/17 and was extubated on 1/18 maintained on BiPAP and HFNC  - However, reintubated on 2/1/2021 for airway protection following seizure activity  - Wean vent as able per critical care team    History of stroke  Assessment & Plan  - Patient has had multiple prior strokes with residual left UE and bilateral LE weakness  - Patient is maintained on aspirin 81 mg and Lipitor 40 mg at home  - Strokes thought the be secondary to atrial fibrillation and inability to be anticoagulated due to prior GI bleeds  - Patient previously followed with Dr Lela Albright in the outpatient neurovascular clinic  - CT head on 2/1/2021 negative for any acute intracranial abnormalities    Acute kidney injury superimposed on CKD [facility])  Assessment & Plan  Lab Results   Component Value Date    EGFR 39 02/02/2021    EGFR 34 02/01/2021    EGFR 36 02/01/2021    CREATININE 1 81 (H) 02/02/2021    CREATININE 2 05 (H) 02/01/2021    CREATININE 1 94 (H) 02/01/2021     - Nephrology following    Pneumonia due to COVID-19 virus  Assessment & Plan  - COVID-19 positive on 1/17/2021  - On Decadron  - Received convalescent plasma on 1/17  - Broad spectrum antibiotics (cefepime and Flagyl) started on 2/1/2021 due to concern for superimposed bacterial pneumonia    Atrial fibrillation ([facility] [address] )  Assessment & Plan  - Known atrial fibrillation but cannot be on anticoagulation due to prior GI bleeding  - Currently on Lovenox    Essential hypertension  Assessment & Plan  - Normotensive goal  - Currently on norepinephrine gtt due to hypotension after starting propofol gtt  - Management per critical care team        Nolvia Nuñez will need follow up in in 4 weeks with epilepsy attending or advance practitioner  Unclear at this time if he will need further imaging as an outpatient  History of Present Illness     Reason for Consult / Principal Problem: concern for seizure  Hx and PE limited by: patient intubated and sedated on propofol gtt  HPI: Nolvia Nuñez is a 63 y o  male with multiple prior strokes with residual left arm weakness and bilateral LE weakness, atrial fibrillation not on anticoagulation due to history of GI bleeding, HTN, and HLD who presented to [facility] ED on 1/17/2021 from [address] due to respiratory distress, fever, AMS, and COVID positive status  History obtained per chart review due to patient being intubated  Patient resides at [address] [place] [address]    He has aylin there since 2019 after a prolonged hospitalization for septic and cardiogenic shock, gram positive bacteremia, respiratory failure, renal failure, and new stroke  Faith received his first COVID vaccine on January 8th  Due to recent outbreak of COVID at his nursing facility, the patient's are swabbed every day  His COVID swab came back positive on 1/16/2021  Patient had been coughing for several weeks and on the 17th, became progressively encephalopathic  When he arrived at the [facility] ED, patient was sedated on ketamine and rocuronium and intubated due to hypercapnia and hypoxia  Approximately 1 hour later, patient became restless requiring propofol gtt  However, had subsequent hypotension, so was started on norepinephrine  Patient was transferred to [facility] for further management  Patient was started on severe protocol for COVID-19 ventilator settings, and was successfully extubated on 1/18  He received convalescent plasma on 1/17  Patient was transferred out of the ICU  Patient was found to be bacteremic with mixed gram positive blood cultures and was started on IV vancomycin per ID  However, appeared to be mixed contaminants, so antibiotics discontinued on 1/21  Nephrology was consulted due to PATRICK, likely multifactorial from VOID-19/sepsis/hypotension and vancomycin  During his hospitalization, patient expressed passive death wishes, so psychiatry has been following  On 1/24, patient was found to be obtunded with acute respiratory acidosis with hypercapnia and was on BiPAP or high flow nasal cannula  Yesterday (2/1/2021), patient had O2 sats in the mid 80s with course breath sounds, so patient was again placed on BiPAP transferred back to the ICU  Last evening, patient was noted to have tonic clonic seizure activity  Due to inability to protect airway, patient was emergently intubated  Initially ativan 2 mg, propofol, and succinylcholine were pushed through peripheral IV without abatement of seizure    After etomidate was given, seizure stopped  (It appears that the seizure lasted for approximately 9 minutes when looking at the medication time history)  Patient was loaded with Keppra 2 g and maintained on Keppra 1000 mg BID  Patient has been on propofol gtt, but due to hypotension, was started on norepinephrine gtt  STAT CT head showed stable encephalomalacia in the bilateral posterior parietal lobs, left anterior frontal lobe, left cerebellum, but no hemorrhage or acute intracranial abnormalities  On exam, patient is intubated and sedated on propofol gtt  Sedation held for the exam   No withdrawal to noxious stimuli and no purposeful movement in any extremity, however, does grimace to noxious stimuli in bilateral upper extremities and lower extremities and slight withdrawal to noxious stimuli in the left LE  No known seizure history  Inpatient consult to Neurology  Consult performed by: Karol Perez PA-C  Consult ordered by: Kaley Baez PA-C          Review of Systems   Unable to perform ROS: Intubated       Historical Information   Past Medical History:   Diagnosis Date    Allergic rhinitis     last assessed 9/12/12    Finger fracture, right     Closed fx of the middle phalanx of the right 5th finger  last assessed 1/30/14    GI bleed 2/22/2019    Hemorrhoids     last assessed 2/10/14    Hyperlipidemia     Hypertension     Stroke [facility])      Past Surgical History:   Procedure Laterality Date    AORTIC VALVE REPLACEMENT  07/30/2014    AVR with 25mm [facility] Ease bovine pericardial valve    CARDIAC CATHETERIZATION  07/18/2014    SLB left main-normal, circumflex-normal, ramus intermedius-normal, RCA was large and dominant giving rise to the PDA & a large posterolateral branch  No disease  last assessed 8/19/14     COLONOSCOPY N/A 2/25/2019    Procedure: COLONOSCOPY;  Surgeon: Von Horne DO;  [facility]: [facility];   Service: Gastroenterology    ESOPHAGOGASTRODUODENOSCOPY N/A 2/22/2019    Procedure: ESOPHAGOGASTRODUODENOSCOPY (EGD)-roadshow overnight;  Surgeon: Alisa Patel DO;  Location: [facility]; Service: Gastroenterology    ESOPHAGOGASTRODUODENOSCOPY N/A 2/23/2019    Procedure: ESOPHAGOGASTRODUODENOSCOPY (EGD); Surgeon: Klaudia Kate MD;  [facility]; Service: Gastroenterology    ESOPHAGOGASTRODUODENOSCOPY N/A 2/25/2019    Procedure: ESOPHAGOGASTRODUODENOSCOPY (EGD); Surgeon: Alisa Patel DO;  [facility]; Service: Gastroenterology    IR NON-TUNNELED CENTRAL LINE PLACEMENT  3/1/2019    IR NON-TUNNELED CENTRAL LINE PLACEMENT  2/4/2019    IR TUNNELED DIALYSIS CATHETER CHECK/CHANGE/REPOSITION/ANGIOPLASTY  4/18/2019    IR TUNNELED DIALYSIS CATHETER PLACEMENT  2/4/2019    IR TUNNELED DIALYSIS CATHETER PLACEMENT  2/18/2019    KNEE ARTHROSCOPY      KNEE CARTILAGE SURGERY Left     medial meniscus tear     TESTICLE SURGERY      TONSILLECTOMY AND ADENOIDECTOMY      TOOTH EXTRACTION       Social History   Social History     Substance and Sexual Activity   Alcohol Use Never    Frequency: Never    Comment: ocassion /never drank alcohol per [employer]      Social History     Substance and Sexual Activity   Drug Use No     E-Cigarette/Vaping    E-Cigarette Use Never User      E-Cigarette/Vaping Substances    Nicotine No     THC No     CBD No     Flavoring No     Other No     Unknown No      Social History     Tobacco Use   Smoking Status Never Smoker   Smokeless Tobacco Never Used     Family History:   Family History   Problem Relation Age of Onset    Lung cancer Mother     Heart disease Mother         pacemaker placement     Coronary artery disease Father     Hypertension Father     Heart attack Father     Cancer Family         bladder        Review of previous medical records was completed   Reviewed prior notes, labs, CT head    Meds/Allergies   all current active meds have been reviewed, current meds:   Current Facility-Administered Medications   Medication Dose Route Frequency    acetaminophen (TYLENOL) oral suspension 650 mg  650 mg Per NG Tube Q4H PRN    atorvastatin (LIPITOR) tablet 40 mg  40 mg Per NG Tube HS    bisacodyl (DULCOLAX) rectal suppository 10 mg  10 mg Rectal Daily PRN    bumetanide (BUMEX) tablet 2 mg  2 mg Per NG Tube BID    cefepime (MAXIPIME) 2,000 mg in dextrose 5 % 50 mL IVPB  2,000 mg Intravenous Q12H    chlorhexidine (PERIDEX) 0 12 % oral rinse 15 mL  15 mL Swish & Spit Q12H [address]    cholecalciferol (VITAMIN D3) tablet 2,000 Units  2,000 Units Per NG Tube Daily    dexamethasone (DECADRON) injection 6 mg  6 mg Intravenous Daily    docusate sodium (COLACE) capsule 100 mg  100 mg Oral BID    enoxaparin (LOVENOX) subcutaneous injection 60 mg  60 mg Subcutaneous Q12H [address]    epoprostenol (VELETRI) 30,000 ng/mL in sodium chloride 0 9 % 50 mL inhalation solution  6 25-50 ng/kg/min (Ideal) Inhalation Titrated    famotidine (PEPCID) oral suspension 20 mg  20 mg Per NG Tube BID    levETIRAcetam (KEPPRA) 1,000 mg in sodium chloride 0 9 % 100 mL IVPB  1,000 mg Intravenous Q12H [address]    methocarbamol (ROBAXIN) tablet 500 mg  500 mg Oral Q8H PRN    metoprolol (LOPRESSOR) injection 5 mg  5 mg Intravenous Q6H PRN    metroNIDAZOLE (FLAGYL) IVPB (premix) 500 mg 100 mL  500 mg Intravenous Q8H    multivitamin with iron-minerals liquid 15 mL  15 mL Per NG Tube Daily    NOREPINEPHRINE 4 MG  ML NSS (CMPD ORDER) infusion  1-30 mcg/min Intravenous Titrated    polyethylene glycol (MIRALAX) packet 17 g  17 g Oral BID    potassium chloride 20 mEq IVPB (premix)  20 mEq Intravenous Once    propofol (DIPRIVAN) 1000 mg in 100 mL infusion (premix)  5-50 mcg/kg/min Intravenous Titrated    senna-docusate sodium (SENOKOT S) 8 6-50 mg per tablet 1 tablet  1 tablet Per NG Tube HS    sertraline (ZOLOFT) tablet 125 mg  125 mg Per NG Tube Daily    sodium chloride 0 9 % bolus 1,000 mL  1,000 mL Intravenous Once    traMADol (ULTRAM) tablet 50 mg  50 mg Oral Once    vancomycin (VANCOCIN) 1,750 mg in sodium chloride 0 9 % 500 mL IVPB  15 mg/kg (Adjusted) Intravenous Daily PRN    and PTA meds:   Prior to Admission Medications   Prescriptions Last Dose Informant Patient Reported? Taking?    Cholecalciferol (VITAMIN D3) 63008 units TABS  Outside Facility (Specify) Yes No   Sig: Take by mouth   POTASSIUM CHLORIDE PO  Self Yes No   Sig: Take 40 mEq by mouth 2 (two) times a day   Probiotic Product (BACID PO)   Yes No   Sig: Take by mouth   acetaminophen (TYLENOL) 325 mg tablet   No No   Sig: Take 2 tablets (650 mg total) by mouth every 6 (six) hours as needed for mild pain   allopurinol (ZYLOPRIM) 100 mg tablet   Yes No   Sig: Take 100 mg by mouth daily   aspirin (ECOTRIN LOW STRENGTH) 81 mg EC tablet   No No   Sig: Take 1 tablet (81 mg total) by mouth daily   atorvastatin (LIPITOR) 40 mg tablet   No No   Sig: Take 1 tablet (40 mg total) by mouth daily with dinner   b complex-vitamin C-folic acid (NEPHROCAPS) 1 mg capsule   No No   Sig: Take 1 capsule by mouth daily with dinner   bisacodyl (DULCOLAX) 10 mg suppository   No No   Sig: Insert 1 suppository (10 mg total) into the rectum daily as needed for constipation   Patient not taking: Reported on 1/17/2021   bumetanide (BUMEX) 1 mg tablet   Yes No   Sig: Take 2 mg by mouth daily    diltiazem (CARDIZEM CD) 180 mg 24 hr capsule   No No   Sig: Take 1 capsule (180 mg total) by mouth daily   enoxaparin (LOVENOX) 40 mg/0 4 mL   Yes No   Sig: Inject 40 mg under the skin daily   magnesium oxide (MAG-OX) 400 mg   No No   Sig: Take 1 tablet (400 mg total) by mouth daily   metoprolol tartrate (LOPRESSOR) 50 mg tablet   No No   Sig: Take 1 tablet (50 mg total) by mouth 3 (three) times a day   pantoprazole (PROTONIX) 40 mg tablet   No No   Sig: Take 1 tablet (40 mg total) by mouth 2 (two) times a day before meals   Patient taking differently: Take 40 mg by mouth daily    sertraline (ZOLOFT) 100 mg tablet   Yes No   Sig: Take 100 mg by mouth daily   sertraline (ZOLOFT) 25 mg tablet   No No   Sig: Take 1 tablet (25 mg total) by mouth daily after dinner   Patient taking differently: Take 50 mg by mouth daily after dinner 75mg daily   traMADol (ULTRAM) 50 mg tablet  Outside Facility (Specify) Yes No   Sig: Take 50 mg by mouth every 6 (six) hours as needed for moderate pain      Facility-Administered Medications: None       Allergies   Allergen Reactions    Amiodarone      Developed Rash    Penicillins Rash     However, has subsequently tolerated Cefazolin and Cefepime       Objective   Vitals:Blood pressure 100/59, pulse 66, temperature (!) 96 6 °F (35 9 °C), resp  rate 20, height 5' 11" (1 803 m), weight (!) 168 kg (369 lb 14 9 oz), SpO2 98 %  ,Body mass index is 51 6 kg/m²  Intake/Output Summary (Last 24 hours) at 2/2/2021 1028  Last data filed at 2/2/2021 0957  Gross per 24 hour   Intake 2605 34 ml   Output 1725 ml   Net 880 34 ml       Invasive Devices: Invasive Devices     Peripheral Intravenous Line            Long-Dwell Peripheral IV (Midline) 33/92/71 Left Cephalic 7 days    Peripheral IV 02/01/21 Right Forearm less than 1 day          Drain            Urethral Catheter Straight-tip 16 Fr  15 days    NG/OG/Enteral Tube Orogastric less than 1 day          Airway            ETT  Cuffed; Hi-Lo 8 mm less than 1 day                Physical Exam  Vitals signs and nursing note reviewed  Constitutional:       Comments: Obese male sedated on propofol and intubated  Sedation held for the exam    HENT:      Head: Normocephalic and atraumatic        Nose: Nose normal       Mouth/Throat:      Comments: ETT in place  Eyes:      Comments: Upward and slightly disconjugate gaze, but pupils are equal, round, and reactive   Pulmonary:      Comments: On vent  Musculoskeletal:      Comments: No spontaneous movement in any extremity   Neurological:      Deep Tendon Reflexes:      Reflex Scores:       Bicep reflexes are 2+ on the right side and 2+ on the left side  Brachioradialis reflexes are 2+ on the right side and 2+ on the left side  Comments: See full neuro exam below  Neurologic Exam     Mental Status   Patient intubated and sedated on propofol, which was held for the exam   Patient grimaces face to noxious stimuli, and at one point slightly opened eyelids, but otherwise remained unresponsive throughout the exam   Unable to follow any commands  Cranial Nerves   Pupils 3 mm, round, reactive to light bilaterally  Upward, slight dysconjugate gaze, but no horizontal gaze deviation  At one point in the exam, patient's eyelids fluttered  Gag intact, and near the end of the exam, patient had forceful cough  No gross facial asymmetry, but difficulty to assess due to ETT  Motor Exam   Muscle bulk: normal  No spontaneous or purposeful movement in any extremity  Sensory Exam     Grimaces when noxious stimuli is applied to bilateral UEs and right LE, but no withdrawal   Grimace and slight withdrawal to noxious stimuli in left LE  Gait, Coordination, and Reflexes     Gait  Gait: (deferred (currently intubated))    Reflexes   Right brachioradialis: 2+  Left brachioradialis: 2+  Right biceps: 2+  Left biceps: 2+  No resting tremor  No clinical seizure noted on exam   No ankle clonus bilaterally  Mute toes bilaterally  Difficulty assessing reflexes in LE due to body habitus  Lab Results:   I have personally reviewed pertinent reports    , CBC:   Results from last 7 days   Lab Units 02/02/21 0442 02/01/21  1058 01/30/21  0629   WBC Thousand/uL 10 68* 12 07* 6 58   RBC Million/uL 4 61 5 15 4 87   HEMOGLOBIN g/dL 10 6* 11 9* 11 1*   HEMATOCRIT % 36 3* 41 3 39 3   MCV fL 79* 80* 81*   PLATELETS Thousands/uL 204 222 195   , BMP/CMP:   Results from last 7 days   Lab Units 02/02/21  0442 02/01/21  1058 02/01/21  0541  01/30/21  0629   SODIUM mmol/L 142 145 145   < > 141   POTASSIUM mmol/L 3 4* 4 3 4 2   < > 4 2   CHLORIDE mmol/L 105 105 105   < > 104   CO2 mmol/L 32 36* 34*   < > 31   BUN mg/dL 64* 68* 64*   < > 57*   CREATININE mg/dL 1 81* 2 05* 1 94*   < > 2 12*   CALCIUM mg/dL 8 1* 8 5 8 3   < > 8 0*   AST U/L 15 14  --   --  21   ALT U/L 22 23  --   --  27   ALK PHOS U/L 56 60  --   --  58   EGFR ml/min/1 73sq m 39 34 36   < > 33    < > = values in this interval not displayed  , Vitamin B12:   , HgBA1C:   , TSH:   , Coagulation:   , Lipid Profile:   , Ammonia:   , Urinalysis:   Results from last 7 days   Lab Units 02/01/21  1151   COLOR UA  Yellow   CLARITY UA  Clear   SPEC GRAV UA  1 010   PH UA  5 5   LEUKOCYTES UA  Negative   NITRITE UA  Negative   GLUCOSE UA mg/dl Negative   KETONES UA mg/dl Negative   BILIRUBIN UA  Negative   BLOOD UA  Trace-Intact*   , Drug Screen:   , Medication Drug Levels:       Invalid input(s): CARBAMAZEPINE,  PHENOBARB, LACOSAMIDE, OXCARBAZEPINE  Imaging Studies: I have personally reviewed pertinent reports  and I have personally reviewed pertinent films in PACS  EKG, Pathology, and Other Studies: I have personally reviewed pertinent reports  and I have personally reviewed pertinent films in PACS  VTE Prophylaxis: Sequential compression device (Venodyne)  and Enoxaparin (Lovenox)    Code Status: Level 1 - Full Code    Counseling / Coordination of Care  Total critical care time spent today 57 minutes  Greater than 50% of total time was spent with the patient and/or family counseling and/or coordination of care  A description of the counseling/coordination of care:  Patient was seen and evaluated  Discussed with attending  Chart reviewed thoroughly including laboratory and imaging studies    Plan of care discussed with patient and primary team

## 2021-02-02 NOTE — PROGRESS NOTES
[address] [facility] NOTE   Timmy Mon 63 y o  male MRN: [id number]  Unit/Bed#: ICU 08 Encounter: [id number]  Reason for Consult:  Acute kidney injury on CKD    ASSESSMENT and PLAN:  1  Acute kidney injury (POA):  · Etiology:  Likely ATN in the setting of COVID-19 pneumonia, +/- RUFINA and Vanco related toxicity  · Urinalysis:  Large blood, innumerable RBCs, fine and coarse granular casts, 2+ protein  · Renal function improving, creatinine down to 1 8  · Blood pressure on the low side but stable  · Good urine output, on intermittent doses of Lasix  · Vancomycin level acceptable, 19 4  · Plan/recommendations:  ? Strict I&O  ? Keep patient negative due to severe COVID-19 pneumonia to avoid volume component  2  Chronic kidney disease, stage III:    · Baseline creatinine 1 1-1 3 mg/dL  He follows with a nephrology group in the [facility] area  3  Volume status:    · Chest x-ray 2/1:  Viral pneumonia changes related to COVID-19  · Good urine output, keep output greater than intake  4  COVID-19:  Severe pathway  Decompensated 02/01/2021 requiring transfer to ICU/intubation/mechanical ventilation  Chest x-ray worsening bilateral consolidation, cardiomegaly  5  Anemia:  Hemoglobin stable  6   Mild hypokalemia:  Replete      SUBJECTIVE / 24H INTERVAL HISTORY:  Intubated, sedated    OBJECTIVE:  Current Weight: Weight - Scale: (!) 168 kg (369 lb 14 9 oz)  Vitals:    02/02/21 1100 02/02/21 1200 02/02/21 1300 02/02/21 1323   BP: 102/57 98/51 103/56    Pulse: 67 70 73    Resp: 19 20 19    Temp: (!) 96 6 °F (35 9 °C) (!) 97 °F (36 1 °C) (!) 97 2 °F (36 2 °C)    TempSrc:       SpO2: 96% 95% 95% 95%   Weight:       Height:           Intake/Output Summary (Last 24 hours) at 2/2/2021 1419  Last data filed at 2/2/2021 1301  Gross per 24 hour   Intake 1855 34 ml   Output 3350 ml   Net -1494 66 ml   Seen through glass viewing window  General:  Critically ill gentleman, mechanically ventilated  Skin: no rash noted  Eyes:  Eyes closed  ENT:  Oral endotracheal tube in place  Neck: supple  Chest:  Equal chest expansion, mechanically ventilated  CVS:  Normal heart rate  Abdomen: soft, nontender, nl sounds  Extremities: + edema LE b/l  : no bo  Neuro:  Sedated    Medications:    Current Facility-Administered Medications:     acetaminophen (TYLENOL) oral suspension 650 mg, 650 mg, Per NG Tube, Q4H PRN, Caty Elias PA-C    atorvastatin (LIPITOR) tablet 40 mg, 40 mg, Per NG Tube, HS, Caty Elias PA-C    bisacodyl (DULCOLAX) rectal suppository 10 mg, 10 mg, Rectal, Daily PRN, Caty Elias PA-C    bumetanide (BUMEX) tablet 2 mg, 2 mg, Per NG Tube, BID, CECILE Trinh-C, 2 mg at 02/02/21 0947    cefepime (MAXIPIME) 2,000 mg in dextrose 5 % 50 mL IVPB, 2,000 mg, Intravenous, Q12H, CECILE Trinh-C, Last Rate: 100 mL/hr at 02/02/21 1000, 2,000 mg at 02/02/21 1000    chlorhexidine (PERIDEX) 0 12 % oral rinse 15 mL, 15 mL, Swish & Spit, Q12H [address], Caty Elias, PA-C, 15 mL at 02/02/21 0404    cholecalciferol (VITAMIN D3) tablet 2,000 Units, 2,000 Units, Per NG Tube, Daily, Caty Elias PA-C, 2,000 Units at 02/02/21 0947    dexamethasone (DECADRON) injection 6 mg, 6 mg, Intravenous, Daily, Caty Elias PA-C, 6 mg at 02/02/21 5903    docusate sodium (COLACE) capsule 100 mg, 100 mg, Oral, BID, Caty Elias PA-C, 100 mg at 02/02/21 0948    epoprostenol (VELETRI) 30,000 ng/mL in sodium chloride 0 9 % 50 mL inhalation solution, 6 25-50 ng/kg/min (Ideal), Inhalation, Titrated, Franchesca Lees PA-C, Last Rate: 7 5 mL/hr at 02/02/21 0943, 50 ng/kg/min at 02/02/21 0943    famotidine (PEPCID) oral suspension 20 mg, 20 mg, Per NG Tube, BID, Caty Elias PA-C, 20 mg at 02/02/21 0947    heparin (porcine) 25,000 units in 0 45% NaCl 250 mL infusion (premix), 3-30 Units/kg/hr (Order-Specific), Intravenous, Titrated, Fei Stevenson DO, Last Rate: 22 5 mL/hr at 02/02/21 1331, 18 Units/kg/hr at 02/02/21 1331    heparin (porcine) injection 10,000 Units, 10,000 Units, Intravenous, Q1H PRN, Caralyn Morris Bilyk, DO    heparin (porcine) injection 5,000 Units, 5,000 Units, Intravenous, Q1H PRN, Caralyn Brazil Yuriyk, DO    levETIRAcetam (KEPPRA) 1,000 mg in sodium chloride 0 9 % 100 mL IVPB, 1,000 mg, Intravenous, Q12H [address], [place] Loth, PA-C, Last Rate: 400 mL/hr at 02/02/21 0900, 1,000 mg at 02/02/21 0900    methocarbamol (ROBAXIN) tablet 500 mg, 500 mg, Oral, Q8H PRN, Johnathan Loth, PA-C, 500 mg at 01/30/21 2111    metoprolol (LOPRESSOR) injection 5 mg, 5 mg, Intravenous, Q6H PRN, [place] Loth, PA-C, 5 mg at 01/26/21 1338    metroNIDAZOLE (FLAGYL) IVPB (premix) 500 mg 100 mL, 500 mg, Intravenous, Q8H, Johnathan Loth, PA-C, Last Rate: 200 mL/hr at 02/02/21 1008, 500 mg at 02/02/21 1008    [COMPLETED] zinc sulfate (ZINCATE) capsule 220 mg, 220 mg, Per NG Tube, Daily, 220 mg at 01/24/21 0824 **FOLLOWED BY** multivitamin with iron-minerals liquid 15 mL, 15 mL, Per NG Tube, Daily, Johnathan Loth, PA-C, 15 mL at 02/02/21 0952    NOREPINEPHRINE 4 MG  ML NSS (CMPD ORDER) infusion, 1-30 mcg/min, Intravenous, Titrated, [place] Loth, PA-C, Last Rate: 3 8 mL/hr at 02/02/21 0418, 1 mcg/min at 02/02/21 0418    polyethylene glycol (MIRALAX) packet 17 g, 17 g, Oral, BID, Johnathan Loth, PA-C, 17 g at 02/02/21 0947    propofol (DIPRIVAN) 1000 mg in 100 mL infusion (premix), 5-50 mcg/kg/min, Intravenous, Titrated, Johnathan Loth, PA-C, Last Rate: 25 2 mL/hr at 02/02/21 1011, 25 mcg/kg/min at 02/02/21 1011    senna-docusate sodium (SENOKOT S) 8 6-50 mg per tablet 1 tablet, 1 tablet, Per NG Tube, HS, Johnathan Steen PA-C    sertraline (ZOLOFT) tablet 125 mg, 125 mg, Per NG Tube, Daily, Johnathan Steen PA-C, 125 mg at 02/02/21 0948    sodium chloride 0 9 % bolus 1,000 mL, 1,000 mL, Intravenous, Once, Johnathan Steen PA-C    traMADol Maria Guadalupeervin Grayi) tablet 50 mg, 50 mg, Oral, Once, Johnathan Steen PA-C   vancomycin (VANCOCIN) 1,750 mg in sodium chloride 0 9 % 500 mL IVPB, 15 mg/kg (Adjusted), Intravenous, Daily PRN, Jian Wilson PA-C    vancomycin (VANCOCIN) 1,750 mg in sodium chloride 0 9 % 500 mL IVPB, 15 mg/kg (Adjusted), Intravenous, Once, Marissa Acuna PA-C    Laboratory Results:  Results from last 7 days   Lab Units 02/02/21  1250 02/02/21  0442 02/01/21  1058 02/01/21  0541 01/31/21  0525 01/30/21  0629 01/29/21  0552 01/28/21  0512 01/27/21  0611  01/27/21  0610   WBC Thousand/uL 13 36* 10 68* 12 07*  --   --  6 58 4 68 4 31  --   --  4 37   HEMOGLOBIN g/dL 11 7* 10 6* 11 9*  --   --  11 1* 9 9* 11 2*  --   --  10 7*   HEMATOCRIT % 40 7 36 3* 41 3  --   --  39 3 34 0* 38 1  --   --  36 7   PLATELETS Thousands/uL 222 204 222  --   --  195 173 184  --   --  180   POTASSIUM mmol/L  --  3 4* 4 3 4 2 4 1 4 2 3 3* 3 8 3 6   < >  --    CHLORIDE mmol/L  --  105 105 105 105 104 106 105 112*   < >  --    CO2 mmol/L  --  32 36* 34* 32 31 27 29 30   < >  --    BUN mg/dL  --  64* 68* 64* 58* 57* 53* 66* 70*   < >  --    CREATININE mg/dL  --  1 81* 2 05* 1 94* 2 10* 2 12* 1 92* 2 25* 2 40*   < >  --    CALCIUM mg/dL  --  8 1* 8 5 8 3 7 9* 8 0* 7 0* 8 6 8 7   < >  --    MAGNESIUM mg/dL  --   --   --   --   --   --   --   --  2 8*  --   --     < > = values in this interval not displayed

## 2021-02-02 NOTE — ASSESSMENT & PLAN NOTE
Lab Results   Component Value Date    EGFR 39 02/02/2021    EGFR 34 02/01/2021    EGFR 36 02/01/2021    CREATININE 1 81 (H) 02/02/2021    CREATININE 2 05 (H) 02/01/2021    CREATININE 1 94 (H) 02/01/2021     - Nephrology following

## 2021-02-02 NOTE — ASSESSMENT & PLAN NOTE
· Secondary to COVID-19 and superimposed ESBL echoli pneumonia   · Previously he had been intubated on 1/17 and extubated on 1/18, reintubated 2/1  · Vent day #9- Wean vent setting as tolerated currently on AC/VC 18/ 450/ 60%/ 6  · Titrate FiO2 for SpO2 > 90%  · Covid therapy per severe pathway protocol   · ABG as needed

## 2021-02-02 NOTE — ASSESSMENT & PLAN NOTE
Lab Results   Component Value Date    EGFR 39 02/02/2021    EGFR 34 02/01/2021    EGFR 36 02/01/2021    CREATININE 1 81 (H) 02/02/2021    CREATININE 2 05 (H) 02/01/2021    CREATININE 1 94 (H) 02/01/2021     · Creatine improved to 1 8 today following additional dose of lasix  · May benefit from more lasix today and continue home bumex

## 2021-02-02 NOTE — ASSESSMENT & PLAN NOTE
Lab Results   Component Value Date    EGFR 50 02/09/2021    EGFR 48 02/08/2021    EGFR 50 02/07/2021    CREATININE 1 49 (H) 02/09/2021    CREATININE 1 53 (H) 02/08/2021    CREATININE 1 48 (H) 02/07/2021     · Creatine improved to 1 49 today   · Diureses as needed  · Renal following  · Strict I/O  · Avoid nephrotoxic meds

## 2021-02-02 NOTE — ASSESSMENT & PLAN NOTE
· Patient does not have a seizure history but does have history of CVA in the past  · Neurology consulted and appreciate input  · Intubated yesterday for seizure activity   · Loaded with 2G Keppra and started on Keppra BID   · Check EEG  · If evidence of status or ongoing seizure activity, will need transfer to B for continuous vEEG

## 2021-02-02 NOTE — ASSESSMENT & PLAN NOTE
· Secondary to hypoxia/hypercapnia and history of CVA with rAttempt to wean sedation and monitor for seizure activity   · Neurology consulted   · EEG- no seizure activity   · CAM-ICU   · Continue Zoloft  · Continue Fentanyl for sedation but patient is awake and following commands  · Overall seems improved

## 2021-02-02 NOTE — ASSESSMENT & PLAN NOTE
· Currently in sinus rhythm      · Not on AC at home secondary to history of GI bleed  · Continue telemetry  · Holding cardizem secondary to hypotension  · If BP tolerated will restart lopressor   · [id number] 3

## 2021-02-02 NOTE — ASSESSMENT & PLAN NOTE
· Secondary to hypoxia/hypercapnia and history of CVA with residual Left-sided weakness and now seizure activity   · Attempt to wean sedation and monitor for seizure activity   · Neurology consulted   · Will likely benefit from EEG- if he shows ongoing signs of seizure activity or status on EEG, he will require transfer to [facility] for continuous vEEG   · Delirium ppx/sleep wake cycle hygiene  · CAM-ICU BID  · Continuous sedation discontinued with extubation yesterday  · Avoid sedating medications  · Continue Zoloft

## 2021-02-02 NOTE — ASSESSMENT & PLAN NOTE
· Secondary to COVID-19 pneumonia   · Patient reintubated yesterday afternoon for airway protectin following seizure activity   · Previously he had been intubated on 1/17 and extubated on 1/18  · Patient transitioned to APRV mode of ventilation without reverse I:E ratio  ABG this AM with P/F ration of 60 despite PEEP 10  Will attempt to trial other mode of ventilation   He was on Bipap prior to intubation for worsening hypoxia and increased WOB   · Titrate FiO2 for SpO2 > 90%  · May benefit from veletri   · Can paralyze if necessary   · Covid therapy per severe pathway protocol

## 2021-02-02 NOTE — PROGRESS NOTES
Vancomycin IV Pharmacy-to-Dose Consultation    Rico Andujar is a 63 y o  male who is currently receiving Vancomycin IV with management by the Pharmacy Consult service  Assessment/Plan:  The patient was reviewed  Renal function is stable and no signs or symptoms of nephrotoxicity and/or infusion reactions were documented in the chart  Based on random trough of 25 7 on 2/2 0044,  plan for a random trough to be drawn at 1300 on 2/2  Will give dose oF Vancomycin of level<20  We will continue to follow the patients culture results and clinical progress daily      Chip Mcmahon, Pharmacist

## 2021-02-02 NOTE — PROGRESS NOTES
Vancomycin IV Pharmacy-to-Dose Consultation    Zeb Ontiveros is a 63 y o  male who is currently receiving Vancomycin IV with management by the Pharmacy Consult service  Assessment/Plan:  The patient was reviewed  Renal function is stable and no signs or symptoms of nephrotoxicity and/or infusion reactions were documented in the chart  Based on todays assessment, continue current vancomycin (day # 2) pulse dosing of 1750 mg daily as needed for random level < 20, with a plan for random to be drawn at 1400 on 2/3  We will continue to follow the patients culture results and clinical progress daily      Kt Luu, Pharmacist

## 2021-02-02 NOTE — ASSESSMENT & PLAN NOTE
- Known atrial fibrillation but previously not on anticoagulation due to prior GI bleeding  - currently on Eliquis 2 5 mg b i d  due to elevated D-dimer in the setting of COVID infection

## 2021-02-02 NOTE — ASSESSMENT & PLAN NOTE
· Continue bumex  · Patient received a dose of lasix yesterday for presumed volume overload   · Was started on low dose vasopressors yesterday for hypotension following intubation and sedation   Remain on levophed at 1mcg/min

## 2021-02-02 NOTE — SPEECH THERAPY NOTE
Speech Language/Pathology  Pt reintubated 2/1/2021 for airway protection following sz activity  Please re consult when extubated & medically appropriate for intervention  Pt w/ h/o silent aspiration in 2019 & should receive f/u after extubation

## 2021-02-02 NOTE — ASSESSMENT & PLAN NOTE
63 y o  male with multiple prior strokes with residual left arm weakness and bilateral LE weakness, atrial fibrillation not on anticoagulation due to history of GI bleeding, HTN, HLD, and obesity who presented to [facility] ED on 1/17/2021 from [address] due to respiratory distress, fever, AMS, and COVID positive status  Due to hypercapnia and hypoxia, patient was emergently intubated and transferred to [facility] ICU for further management  Patient was successfully extubated on 1/18 and transferred to the floor; however, required intermittent BiPAP and high flow nasal cannula due to low O2 sats  On 2/1/2021, patient's O2 sats were in the mid 80s, so patient was transferred back to the ICU  He was started on cefepime and Flagyl due to concern for pneumonia  The evening of 02/01/2021, patient had witnessed tonic-clonic seizure activity, was intubated for airway protection, and received Ativan 2 mg, propofol, succinylcholine, and etomidate with cessation of seizure after approximately 9 minutes  Patient was loaded with Keppra 2 g and started on maintenance Keppra 1 g BID  CT head showed stable encephalomalacia in the bilateral posterior parietal lobes, left anterior frontal lobe, and left cerebellum, but no hemorrhage or acute intracranial abnormalities  No known seizure history  Etiology for new onset seizure likely due to having a lowered seizure threshold from multiple prior strokes, current COVID-19 infection, transient hypoxia, and use of cefepime  Plan:  - Decreased Keppra from 1000 mg Q12 hours to 750 mg Q12 hours due to compromised renal function  Continue same   - Patient does not drive; currently living at [facility] 227 management and supportive care per primary team   Correction of any metabolic or infectious disturbances   - Follow up with outpatient epilepsy team  Office will call to schedule an appointment      Results:  - CT head x2:  · Stable areas of encephalomalacia in the bilateral posterior parietal lobes, left anterior frontal lobe and left cerebellar hemisphere is seen  There is no evidence of acute intracranial hemorrhage  There is no mass effect or midline shift   - routine EEG on 02/02/2021 demonstrated generalized slowing but no focal or epileptiform features, or electrographic seizures    - COVID-19 positive (1/17/2021)

## 2021-02-02 NOTE — ASSESSMENT & PLAN NOTE
· Currently in sinus rhythm      · Not on AC at home secondary to history of GI bleed  · Cont heparin gtt secondary to elevated D dimer  · Holding cardizem secondary to hypotension  · If BP improved need to consider restart Lopressor    · [id number] 3

## 2021-02-02 NOTE — NURSING NOTE
Noted patient to have seizure like activity  CC AP alerted to bedside and intubated by provider  STAT CT of head ordered, however, pt desating, ct placed on hold until sats improve  CC AP made aware, sedation ordered  Will continue to monitor

## 2021-02-02 NOTE — ASSESSMENT & PLAN NOTE
· Concern for PE/DVT  · Will check LE duplex  · Start heparin gtt  · Hold on CT imaging given elevated creatine

## 2021-02-02 NOTE — ASSESSMENT & PLAN NOTE
- COVID-19 positive on 1/17/2021  - On Decadron  - Received convalescent plasma on 1/17  - Broad spectrum antibiotics (cefepime and Flagyl) started on 2/1/2021 due to concern for superimposed bacterial pneumonia

## 2021-02-02 NOTE — ASSESSMENT & PLAN NOTE
- Normotensive goal   Blood pressures appear well controlled  In fact, patient on midodrine due to hypotensive issues    - Management per critical care team

## 2021-02-03 ENCOUNTER — APPOINTMENT (INPATIENT)
Dept: NEUROLOGY | Facility: HOSPITAL | Age: 61
DRG: 130 | End: 2021-02-03
Payer: COMMERCIAL

## 2021-02-03 ENCOUNTER — APPOINTMENT (INPATIENT)
Dept: CT IMAGING | Facility: HOSPITAL | Age: 61
DRG: 130 | End: 2021-02-03
Payer: COMMERCIAL

## 2021-02-03 LAB
ALBUMIN SERPL BCP-MCNC: 3.1 G/DL (ref 3.5–5)
ALP SERPL-CCNC: 64 U/L (ref 46–116)
ALT SERPL W P-5'-P-CCNC: 20 U/L (ref 12–78)
ANCILLARY VALUES: ABNORMAL
ANION GAP SERPL CALCULATED.3IONS-SCNC: 5 MMOL/L (ref 4–13)
ANISOCYTOSIS BLD QL SMEAR: PRESENT
APTT PPP: 163 SECONDS (ref 23–37)
APTT PPP: 72 SECONDS (ref 23–37)
APTT PPP: 99 SECONDS (ref 23–37)
ARTERIAL PATENCY WRIST A: ABNORMAL
ARTERIAL PATENCY WRIST A: YES
AST SERPL W P-5'-P-CCNC: 16 U/L (ref 5–45)
BASE EXCESS BLDA CALC-SCNC: 4.4 MMOL/L
BASE EXCESS BLDA CALC-SCNC: 7 MMOL/L (ref -2–3)
BASE EXCESS BLDA CALC-SCNC: 9 MMOL/L (ref -2–3)
BASO STIPL BLD QL SMEAR: PRESENT
BASOPHILS # BLD MANUAL: 0 THOUSAND/UL (ref 0–0.1)
BASOPHILS NFR MAR MANUAL: 0 % (ref 0–1)
BILIRUB SERPL-MCNC: 0.58 MG/DL (ref 0.2–1)
BUN SERPL-MCNC: 60 MG/DL (ref 5–25)
CALCIUM ALBUM COR SERPL-MCNC: 9.1 MG/DL (ref 8.3–10.1)
CALCIUM SERPL-MCNC: 8.4 MG/DL (ref 8.3–10.1)
CHLORIDE SERPL-SCNC: 104 MMOL/L (ref 100–108)
CO2 SERPL-SCNC: 35 MMOL/L (ref 21–32)
CREAT SERPL-MCNC: 1.86 MG/DL (ref 0.6–1.3)
CRP SERPL QL: 11.5 MG/L
D DIMER PPP FEU-MCNC: 7.15 UG/ML FEU
DS:DELIVERY SYSTEM: AC
EOSINOPHIL # BLD MANUAL: 0 THOUSAND/UL (ref 0–0.4)
EOSINOPHIL NFR BLD MANUAL: 0 % (ref 0–6)
ERYTHROCYTE [DISTWIDTH] IN BLOOD BY AUTOMATED COUNT: 19.3 % (ref 11.6–15.1)
FERRITIN SERPL-MCNC: 242 NG/ML (ref 8–388)
FIO2 GAS DIL.REBREATH: 100 L
GFR SERPL CREATININE-BSD FRML MDRD: 38 ML/MIN/1.73SQ M
GIANT PLATELETS BLD QL SMEAR: PRESENT
GLUCOSE SERPL-MCNC: 101 MG/DL (ref 65–140)
GLUCOSE SERPL-MCNC: 105 MG/DL (ref 65–140)
GLUCOSE SERPL-MCNC: 148 MG/DL (ref 65–140)
GLUCOSE SERPL-MCNC: 82 MG/DL (ref 65–140)
GLUCOSE SERPL-MCNC: 82 MG/DL (ref 65–140)
GLUCOSE SERPL-MCNC: 92 MG/DL (ref 65–140)
HCO3 BLDA-SCNC: 29.1 MMOL/L (ref 22–28)
HCO3 BLDA-SCNC: 34.6 MMOL/L (ref 22–28)
HCO3 BLDA-SCNC: 35.4 MMOL/L (ref 22–28)
HCT VFR BLD AUTO: 41.6 % (ref 36.5–49.3)
HCT VFR BLD CALC: 33 % (ref 36.5–49.3)
HCT VFR BLD CALC: 39 % (ref 36.5–49.3)
HGB BLD-MCNC: 12 G/DL (ref 12–17)
HGB BLDA-MCNC: 11.2 G/DL (ref 12–17)
HGB BLDA-MCNC: 13.3 G/DL (ref 12–17)
HOROWITZ INDEX BLDA+IHG-RTO: 95 MM[HG]
LG PLATELETS BLD QL SMEAR: PRESENT
LYMPHOCYTES # BLD AUTO: 0 % (ref 14–44)
LYMPHOCYTES # BLD AUTO: 0 THOUSAND/UL (ref 0.6–4.47)
MACROCYTES BLD QL AUTO: PRESENT
MAGNESIUM SERPL-MCNC: 1.6 MG/DL (ref 1.6–2.6)
MCH RBC QN AUTO: 22.5 PG (ref 26.8–34.3)
MCHC RBC AUTO-ENTMCNC: 28.8 G/DL (ref 31.4–37.4)
MCV RBC AUTO: 78 FL (ref 82–98)
MONOCYTES # BLD AUTO: 0.46 THOUSAND/UL (ref 0–1.22)
MONOCYTES NFR BLD: 3 % (ref 4–12)
NEUTROPHILS # BLD MANUAL: 14.93 THOUSAND/UL (ref 1.85–7.62)
NEUTS BAND NFR BLD MANUAL: 2 % (ref 0–8)
NEUTS SEG NFR BLD AUTO: 95 % (ref 43–75)
NRBC BLD AUTO-RTO: 0 /100 WBCS
O2 CT BLDA-SCNC: 15.3 ML/DL (ref 16–23)
OXYHGB MFR BLDA: 84.9 % (ref 94–97)
PCO2 BLD: 36 MMOL/L (ref 21–32)
PCO2 BLD: 37 MMOL/L (ref 21–32)
PCO2 BLD: 57.7 MM HG (ref 36–44)
PCO2 BLD: 60.8 MM HG (ref 36–44)
PCO2 BLDA: 43.8 MM HG (ref 36–44)
PEEP RESPIRATORY: 10 CM[H2O]
PH BLD: 7.36 [PH] (ref 7.35–7.45)
PH BLD: 7.4 [PH] (ref 7.35–7.45)
PH BLDA: 7.44 [PH] (ref 7.35–7.45)
PHOSPHATE SERPL-MCNC: 2.7 MG/DL (ref 2.3–4.1)
PLATELET # BLD AUTO: 224 THOUSANDS/UL (ref 149–390)
PLATELET BLD QL SMEAR: ABNORMAL
PMV BLD AUTO: 11.5 FL (ref 8.9–12.7)
PO2 BLD: 22 MM HG (ref 75–129)
PO2 BLD: 57 MM HG (ref 75–129)
PO2 BLDA: 48.3 MM HG (ref 75–129)
POIKILOCYTOSIS BLD QL SMEAR: PRESENT
POTASSIUM BLD-SCNC: 3.7 MMOL/L (ref 3.5–5.3)
POTASSIUM BLD-SCNC: 3.8 MMOL/L (ref 3.5–5.3)
POTASSIUM SERPL-SCNC: 4 MMOL/L (ref 3.5–5.3)
PRESSURE SETTING: 10
PROCALCITONIN SERPL-MCNC: 0.45 NG/ML
PROT SERPL-MCNC: 6.2 G/DL (ref 6.4–8.2)
RBC # BLD AUTO: 5.33 MILLION/UL (ref 3.88–5.62)
RBC MORPH BLD: PRESENT
RESPIRATORY RATE: 18
SAMPLE SITE: ABNORMAL
SAO2 % BLD FROM PO2: 36 % (ref 60–85)
SAO2 % BLD FROM PO2: 87 % (ref 60–85)
SODIUM BLD-SCNC: 141 MMOL/L (ref 136–145)
SODIUM BLD-SCNC: 142 MMOL/L (ref 136–145)
SODIUM SERPL-SCNC: 144 MMOL/L (ref 136–145)
SPECIMEN SOURCE: ABNORMAL
TOTAL CELLS COUNTED SPEC: 100
VENT AC: 18
VENT TYPE: ABNORMAL
VENT- AC: AC
VENTILATION VALUE: 500
VT SETTING VENT: 450 ML
WBC # BLD AUTO: 15.39 THOUSAND/UL (ref 4.31–10.16)

## 2021-02-03 PROCEDURE — 80053 COMPREHEN METABOLIC PANEL: CPT | Performed by: NURSE PRACTITIONER

## 2021-02-03 PROCEDURE — 94150 VITAL CAPACITY TEST: CPT

## 2021-02-03 PROCEDURE — 99223 1ST HOSP IP/OBS HIGH 75: CPT | Performed by: NURSE PRACTITIONER

## 2021-02-03 PROCEDURE — 86140 C-REACTIVE PROTEIN: CPT | Performed by: NURSE PRACTITIONER

## 2021-02-03 PROCEDURE — 84145 PROCALCITONIN (PCT): CPT | Performed by: NURSE PRACTITIONER

## 2021-02-03 PROCEDURE — 85027 COMPLETE CBC AUTOMATED: CPT | Performed by: NURSE PRACTITIONER

## 2021-02-03 PROCEDURE — 85379 FIBRIN DEGRADATION QUANT: CPT | Performed by: NURSE PRACTITIONER

## 2021-02-03 PROCEDURE — 4A133J1 MONITORING OF ARTERIAL PULSE, PERIPHERAL, PERCUTANEOUS APPROACH: ICD-10-PCS | Performed by: INTERNAL MEDICINE

## 2021-02-03 PROCEDURE — 85007 BL SMEAR W/DIFF WBC COUNT: CPT | Performed by: NURSE PRACTITIONER

## 2021-02-03 PROCEDURE — 93970 EXTREMITY STUDY: CPT | Performed by: SURGERY

## 2021-02-03 PROCEDURE — 84100 ASSAY OF PHOSPHORUS: CPT | Performed by: NURSE PRACTITIONER

## 2021-02-03 PROCEDURE — 82728 ASSAY OF FERRITIN: CPT | Performed by: NURSE PRACTITIONER

## 2021-02-03 PROCEDURE — 95816 EEG AWAKE AND DROWSY: CPT

## 2021-02-03 PROCEDURE — 82805 BLOOD GASES W/O2 SATURATION: CPT | Performed by: PSYCHIATRY & NEUROLOGY

## 2021-02-03 PROCEDURE — 94003 VENT MGMT INPAT SUBQ DAY: CPT

## 2021-02-03 PROCEDURE — 95816 EEG AWAKE AND DROWSY: CPT | Performed by: PSYCHIATRY & NEUROLOGY

## 2021-02-03 PROCEDURE — 70450 CT HEAD/BRAIN W/O DYE: CPT

## 2021-02-03 PROCEDURE — NC001 PR NO CHARGE: Performed by: PHYSICIAN ASSISTANT

## 2021-02-03 PROCEDURE — 85730 THROMBOPLASTIN TIME PARTIAL: CPT | Performed by: FAMILY MEDICINE

## 2021-02-03 PROCEDURE — G1004 CDSM NDSC: HCPCS

## 2021-02-03 PROCEDURE — 99291 CRITICAL CARE FIRST HOUR: CPT | Performed by: INTERNAL MEDICINE

## 2021-02-03 PROCEDURE — 99232 SBSQ HOSP IP/OBS MODERATE 35: CPT | Performed by: INTERNAL MEDICINE

## 2021-02-03 PROCEDURE — 03HY32Z INSERTION OF MONITORING DEVICE INTO UPPER ARTERY, PERCUTANEOUS APPROACH: ICD-10-PCS | Performed by: INTERNAL MEDICINE

## 2021-02-03 PROCEDURE — 94760 N-INVAS EAR/PLS OXIMETRY 1: CPT

## 2021-02-03 PROCEDURE — 85730 THROMBOPLASTIN TIME PARTIAL: CPT | Performed by: INTERNAL MEDICINE

## 2021-02-03 PROCEDURE — 4A133B1 MONITORING OF ARTERIAL PRESSURE, PERIPHERAL, PERCUTANEOUS APPROACH: ICD-10-PCS | Performed by: INTERNAL MEDICINE

## 2021-02-03 PROCEDURE — 36600 WITHDRAWAL OF ARTERIAL BLOOD: CPT

## 2021-02-03 PROCEDURE — 36620 INSERTION CATHETER ARTERY: CPT | Performed by: PHYSICIAN ASSISTANT

## 2021-02-03 PROCEDURE — 82948 REAGENT STRIP/BLOOD GLUCOSE: CPT

## 2021-02-03 PROCEDURE — 83735 ASSAY OF MAGNESIUM: CPT | Performed by: NURSE PRACTITIONER

## 2021-02-03 RX ORDER — MAGNESIUM SULFATE 1 G/100ML
1 INJECTION INTRAVENOUS ONCE
Status: COMPLETED | OUTPATIENT
Start: 2021-02-03 | End: 2021-02-03

## 2021-02-03 RX ORDER — FUROSEMIDE 10 MG/ML
20 INJECTION INTRAMUSCULAR; INTRAVENOUS
Status: DISCONTINUED | OUTPATIENT
Start: 2021-02-03 | End: 2021-02-04

## 2021-02-03 RX ORDER — FUROSEMIDE 10 MG/ML
40 INJECTION INTRAMUSCULAR; INTRAVENOUS
Status: DISCONTINUED | OUTPATIENT
Start: 2021-02-03 | End: 2021-02-03

## 2021-02-03 RX ORDER — FENTANYL CITRATE 50 UG/ML
50 INJECTION, SOLUTION INTRAMUSCULAR; INTRAVENOUS EVERY 2 HOUR PRN
Status: DISCONTINUED | OUTPATIENT
Start: 2021-02-03 | End: 2021-02-06

## 2021-02-03 RX ADMIN — POLYETHYLENE GLYCOL 3350 17 G: 17 POWDER, FOR SOLUTION ORAL at 09:31

## 2021-02-03 RX ADMIN — LEVETIRACETAM 750 MG: 100 INJECTION, SOLUTION INTRAVENOUS at 20:27

## 2021-02-03 RX ADMIN — HEPARIN SODIUM 15 UNITS/KG/HR: 10000 INJECTION, SOLUTION INTRAVENOUS at 01:47

## 2021-02-03 RX ADMIN — MAGNESIUM SULFATE HEPTAHYDRATE 1 G: 1 INJECTION, SOLUTION INTRAVENOUS at 10:00

## 2021-02-03 RX ADMIN — FUROSEMIDE 20 MG: 10 INJECTION, SOLUTION INTRAMUSCULAR; INTRAVENOUS at 16:10

## 2021-02-03 RX ADMIN — SERTRALINE HYDROCHLORIDE 125 MG: 50 TABLET ORAL at 09:32

## 2021-02-03 RX ADMIN — METRONIDAZOLE 500 MG: 500 INJECTION, SOLUTION INTRAVENOUS at 03:01

## 2021-02-03 RX ADMIN — FENTANYL CITRATE 50 MCG: 50 INJECTION INTRAMUSCULAR; INTRAVENOUS at 22:40

## 2021-02-03 RX ADMIN — FUROSEMIDE 40 MG: 10 INJECTION, SOLUTION INTRAMUSCULAR; INTRAVENOUS at 09:00

## 2021-02-03 RX ADMIN — FAMOTIDINE 20 MG: 40 POWDER, FOR SUSPENSION ORAL at 09:33

## 2021-02-03 RX ADMIN — PROPOFOL 15 MCG/KG/MIN: 10 INJECTION, EMULSION INTRAVENOUS at 20:12

## 2021-02-03 RX ADMIN — CHLORHEXIDINE GLUCONATE 0.12% ORAL RINSE 15 ML: 1.2 LIQUID ORAL at 09:31

## 2021-02-03 RX ADMIN — PROPOFOL 20 MCG/KG/MIN: 10 INJECTION, EMULSION INTRAVENOUS at 01:31

## 2021-02-03 RX ADMIN — Medication 2000 UNITS: at 09:33

## 2021-02-03 RX ADMIN — MULTIVITAMIN 15 ML: LIQUID ORAL at 09:31

## 2021-02-03 RX ADMIN — NOREPINEPHRINE BITARTRATE 1 MCG/MIN: 1 INJECTION, SOLUTION, CONCENTRATE INTRAVENOUS at 07:48

## 2021-02-03 RX ADMIN — POTASSIUM PHOSPHATE, MONOBASIC AND POTASSIUM PHOSPHATE, DIBASIC 6 MMOL: 224; 236 INJECTION, SOLUTION, CONCENTRATE INTRAVENOUS at 09:10

## 2021-02-03 RX ADMIN — DESMOPRESSIN ACETATE 40 MG: 0.2 TABLET ORAL at 21:55

## 2021-02-03 RX ADMIN — PROPOFOL 20 MCG/KG/MIN: 10 INJECTION, EMULSION INTRAVENOUS at 01:06

## 2021-02-03 RX ADMIN — CHLORHEXIDINE GLUCONATE 0.12% ORAL RINSE 15 ML: 1.2 LIQUID ORAL at 20:07

## 2021-02-03 RX ADMIN — FAMOTIDINE 20 MG: 40 POWDER, FOR SUSPENSION ORAL at 17:40

## 2021-02-03 RX ADMIN — LEVETIRACETAM 750 MG: 100 INJECTION, SOLUTION INTRAVENOUS at 09:19

## 2021-02-03 RX ADMIN — DOCUSATE SODIUM AND SENNOSIDES 1 TABLET: 8.6; 5 TABLET ORAL at 21:53

## 2021-02-03 RX ADMIN — POLYETHYLENE GLYCOL 3350 17 G: 17 POWDER, FOR SOLUTION ORAL at 20:08

## 2021-02-03 RX ADMIN — DEXAMETHASONE SODIUM PHOSPHATE 6 MG: 4 INJECTION, SOLUTION INTRA-ARTICULAR; INTRALESIONAL; INTRAMUSCULAR; INTRAVENOUS; SOFT TISSUE at 09:33

## 2021-02-03 RX ADMIN — PROPOFOL 10 MCG/KG/MIN: 10 INJECTION, EMULSION INTRAVENOUS at 05:20

## 2021-02-03 RX ADMIN — HEPARIN SODIUM 10 UNITS/KG/HR: 10000 INJECTION, SOLUTION INTRAVENOUS at 20:13

## 2021-02-03 RX ADMIN — DOCUSATE SODIUM 100 MG: 100 CAPSULE, LIQUID FILLED ORAL at 09:33

## 2021-02-03 NOTE — CONSULTS
Vancomycin IV Pharmacy-to-Dose Consultation    Alfredo Crawford is a 63 y o  male who was receiving Vancomycin IV with management by the Pharmacy Consult service for treatment of Pneumonia    The patient's Vancomycin therapy has been completed / discontinued  Thank you for allowing us to take part in this patient's care  Pharmacy will sign-off now; please call or re-consult if there are any questions          Mar Nguyễn, PharmD  Pharmacist

## 2021-02-03 NOTE — PROGRESS NOTES
[address] [facility] NOTE   Nolvia Nuñez 63 y o  male MRN: [id number]  Unit/Bed#: ICU 08 Encounter: [id number]  Reason for Consult:  Acute kidney injury    ASSESSMENT and PLAN:  1  Acute kidney injury (POA):  · Etiology:  Likely ATN in the setting of COVID-19 pneumonia, +/- RUFINA and possible Vanco related toxicity  · Creatinine down to 1 86 with 6 L urine output in the last 24 hours  · Urinalysis:  Large blood, innumerable RBCs, fine and coarse granular casts, 2+ protein  · Renal function improving, creatinine down to 1 8  · Blood pressure acceptable  Patient not requiring pressor support  · Excellent urine output on loop diuretic  · Plan/recommendations:  ? Strict I&O  ? Keep patient negative due to severe COVID-19 pneumonia to avoid volume component  2  Chronic kidney disease, stage III:    · Baseline creatinine 1 1-1 3 mg/dL   He follows with a nephrology group in the [facility] area  3  Volume status:    · Chest x-ray 2/1:  Viral pneumonia changes related to COVID-19  · Good urine output, keep output greater than intake  4  COVID-19:  Severe pathway     · Decompensated 02/01/2021 requiring transfer to ICU/intubation/mechanical ventilation   Chest x-ray worsening bilateral consolidation, cardiomegaly  · Ventilator dependency:  With FiO2 95%, peep of +10  · Management per primary team  5  Anemia:  Hemoglobin stable  6  Mild hypokalemia:  Replete  7   Encephalopathy:  CT of the head showing stable old infarcts and chronic microangiopathic changes no acute changes       DISPOSITION:  Continue diuretic as needed    SUBJECTIVE / 24H INTERVAL HISTORY:  Patient intubated, quietly lying in bed    OBJECTIVE:  Current Weight: Weight - Scale: (!) 168 kg (369 lb 14 9 oz)  Vitals:    02/03/21 0700 02/03/21 0805 02/03/21 0900 02/03/21 1118   BP: 108/57  106/56    Pulse: 89  90    Resp: 22  22    Temp: 97 7 °F (36 5 °C)  98 1 °F (36 7 °C)    TempSrc:       SpO2: 94% 93% 92% 91%   Weight:       Height: Intake/Output Summary (Last 24 hours) at 2/3/2021 1213  Last data filed at 2/3/2021 0900  Gross per 24 hour   Intake 2436 54 ml   Output 4430 ml   Net -1993 46 ml   Seen via class viewing window  General:  Critically ill gentleman, mechanically ventilated no acute distress noted  Skin: no rash  Eyes: anicteric sclera  ENT: moist mucous membrane  Neck: supple  Chest: CTA b/l, no ronchii, no wheeze, no rubs, no rales  CVS: s1s2, no murmur, no gallop, no rub  Abdomen: soft, nontender, nl sounds  Extremities:  Peripheral edema   : bo  Neuro:  No spontaneous movements, lying quietly in bed, on sedation    Medications:    Current Facility-Administered Medications:     acetaminophen (TYLENOL) oral suspension 650 mg, 650 mg, Per NG Tube, Q4H PRN, Deangelo Liz PA-C    atorvastatin (LIPITOR) tablet 40 mg, 40 mg, Per NG Tube, HS, Deangelo Liz PA-C, 40 mg at 02/02/21 2240    bisacodyl (DULCOLAX) rectal suppository 10 mg, 10 mg, Rectal, Daily PRN, Deangelo Liz PA-C    chlorhexidine (PERIDEX) 0 12 % oral rinse 15 mL, 15 mL, Swish & Spit, Q12H [address], Deangelo Liz PA-C, 15 mL at 02/03/21 9692    cholecalciferol (VITAMIN D3) tablet 2,000 Units, 2,000 Units, Per NG Tube, Daily, Deangelo Liz PA-C, 2,000 Units at 02/03/21 0933    dexamethasone (DECADRON) injection 6 mg, 6 mg, Intravenous, Daily, Deangelo Liz PA-C, 6 mg at 02/03/21 0933    docusate sodium (COLACE) capsule 100 mg, 100 mg, Oral, BID, Deangelo Liz PA-C, 100 mg at 02/03/21 0933    famotidine (PEPCID) oral suspension 20 mg, 20 mg, Per NG Tube, BID, Deangelo Liz PA-C, 20 mg at 02/03/21 0933    furosemide (LASIX) injection 40 mg, 40 mg, Intravenous, BID (diuretic), KATHY Dietrich 40 mg at 02/03/21 0900    heparin (porcine) 25,000 units in 0 45% NaCl 250 mL infusion (premix), 3-30 Units/kg/hr (Order-Specific), Intravenous, Titrated, Jana tSevenson DO, Last Rate: 15 mL/hr at 02/03/21 1134, 12 Units/kg/hr at 02/03/21 1134    heparin (porcine) injection 10,000 Units, 10,000 Units, Intravenous, Q1H PRN, Lonzell Pilling Bilyk, DO    heparin (porcine) injection 5,000 Units, 5,000 Units, Intravenous, Q1H PRN, Lonzell Pilling Bilyk, DO    levETIRAcetam (KEPPRA) 750 mg in sodium chloride 0 9 % 100 mL IVPB, 750 mg, Intravenous, Q12H [address], Albaureliano Heller PA-C, Last Rate: 400 mL/hr at 02/03/21 0919, 750 mg at 02/03/21 0919    [COMPLETED] zinc sulfate (ZINCATE) capsule 220 mg, 220 mg, Per NG Tube, Daily, 220 mg at 01/24/21 0824 **FOLLOWED BY** multivitamin with iron-minerals liquid 15 mL, 15 mL, Per NG Tube, Daily, Melvin Caro PA-C, 15 mL at 02/03/21 0931    NOREPINEPHRINE 4 MG  ML NSS (CMPD ORDER) infusion, 1-30 mcg/min, Intravenous, Titrated, Melvin Caro PA-C, Last Rate: 3 8 mL/hr at 02/03/21 0748, 1 mcg/min at 02/03/21 0748    polyethylene glycol (MIRALAX) packet 17 g, 17 g, Oral, BID, Melvin Caro PA-C, 17 g at 02/03/21 0931    propofol (DIPRIVAN) 1000 mg in 100 mL infusion (premix), 5-50 mcg/kg/min, Intravenous, Titrated, Melvin Caro PA-C, Last Rate: 15 1 mL/hr at 02/03/21 0728, 15 mcg/kg/min at 02/03/21 0728    senna-docusate sodium (SENOKOT S) 8 6-50 mg per tablet 1 tablet, 1 tablet, Per NG Tube, HS, Melvin Caro PA-C, 1 tablet at 02/02/21 2240    sertraline (ZOLOFT) tablet 125 mg, 125 mg, Per NG Tube, Daily, Melvin Caro PA-C, 125 mg at 02/03/21 0932    sodium chloride 0 9 % bolus 1,000 mL, 1,000 mL, Intravenous, Once, Melvin Caro PA-C    Laboratory Results:  Results from last 7 days   Lab Units 02/03/21  0504 02/03/21  0457 02/02/21  1250 02/02/21  0955 02/02/21  0442 02/01/21  2059 02/01/21  1058 02/01/21  0541 01/31/21  0525 01/30/21  0629 01/29/21  0552 01/28/21  0512   WBC Thousand/uL 15 39*  --  13 36*  --  10 68*  --  12 07*  --   --  6 58 4 68 4 31   HEMOGLOBIN g/dL 12 0  --  11 7*  --  10 6*  --  11 9*  --   --  11 1* 9 9* 11 2*   I STAT HEMOGLOBIN g/dl  --   --   -- 11 2*  --  13 3  --   --   --   --   --   --    HEMATOCRIT % 41 6  --  40 7  --  36 3*  --  41 3  --   --  39 3 34 0* 38 1   HEMATOCRIT, ISTAT %  --   --   --  33*  --  39  --   --   --   --   --   --    PLATELETS Thousands/uL 224  --  222  --  204  --  222  --   --  195 173 184   POTASSIUM mmol/L  --  4 0  --   --  3 4*  --  4 3 4 2 4 1 4 2 3 3* 3 8   CHLORIDE mmol/L  --  104  --   --  105  --  105 105 105 104 106 105   CO2 mmol/L  --  35*  --   --  32  --  36* 34* 32 31 27 29   CO2, I-STAT mmol/L  --   --   --  37*  --  36*  --   --   --   --   --   --    BUN mg/dL  --  60*  --   --  64*  --  68* 64* 58* 57* 53* 66*   CREATININE mg/dL  --  1 86*  --   --  1 81*  --  2 05* 1 94* 2 10* 2 12* 1 92* 2 25*   CALCIUM mg/dL  --  8 4  --   --  8 1*  --  8 5 8 3 7 9* 8 0* 7 0* 8 6   MAGNESIUM mg/dL  --  1 6  --   --   --   --   --   --   --   --   --   --    PHOSPHORUS mg/dL  --  2 7  --   --   --   --   --   --   --   --   --   --    GLUCOSE, ISTAT mg/dl  --   --   --  105  --  148*  --   --   --   --   --   --

## 2021-02-03 NOTE — CASE MANAGEMENT
Patient remains medically unstable for D/C at this time  Patient was transferred back to the ICU on 2/1 following increased work of breathing as well as FiO2 requirements  Once in the ICU patient was witnessed to have a seizure  Later that night into the next morning patient required intubation  CM received a voicemail from [facility] the Admissions Director [facility] [phone] and requested a call back for updates as patient was a MA 15 day bedhold and he has exceeded this  So he is a drop from the facility and patient would be a new admission and he would need to be re-evaluated prior to his return to ensure they would be able to meet his care needs  William reported that they will continue to follow along with patient during his stay  CM department will continue to follow to assist with discharge coordination

## 2021-02-03 NOTE — NUTRITION
Chart reviewed  TF initiated this AM   Propofol rate noted, pt receiving 398 kcal from current rate  Recommend goal TF rate of Jevity 1 5 @ 50 ml/hr with additional 2 packets of Prosource TF daily  Including calories from propofol rate, will provide 2278 kcal, 99g protein, 912 ml free water  Additional free water per primary team, volume overload noted  Will closely monitor for changes to propofol rate that would allow changes to TF rate

## 2021-02-03 NOTE — PLAN OF CARE
Problem: SAFETY,RESTRAINT: NV/NON-SELF DESTRUCTIVE BEHAVIOR  Goal: Remains free of harm/injury (restraint for non violent/non self-detsructive behavior)  Description: INTERVENTIONS:  - Instruct patient/family regarding restraint use   - Assess and monitor physiologic and psychological status   - Provide interventions and comfort measures to meet assessed patient needs   - Identify and implement measures to help patient regain control  - Assess readiness for release of restraint   Outcome: Progressing  Goal: Returns to optimal restraint-free functioning  Description: INTERVENTIONS:  - Assess the patient's behavior and symptoms that indicate continued need for restraint  - Identify and implement measures to help patient regain control  - Assess readiness for release of restraint   Outcome: Progressing     Problem: Potential for Falls  Goal: Patient will remain free of falls  Description: INTERVENTIONS:  - Assess patient frequently for physical needs  -  Identify cognitive and physical deficits and behaviors that affect risk of falls    -  [place] fall precautions as indicated by assessment   - Educate patient/family on patient safety including physical limitations  - Instruct patient to call for assistance with activity based on assessment  - Modify environment to reduce risk of injury  - Consider OT/PT consult to assist with strengthening/mobility  2/2/2021 1911 by Yohan Montelongo RN  Outcome: Progressing  2/2/2021 1910 by Yohan Montelongo, RN  Outcome: Progressing

## 2021-02-03 NOTE — CONSULTS
Consultation - Palliative and Supportive Care   Vivienne Orr 63 y o  male [id number]    Patient Active Problem List   Diagnosis    Aortic stenosis, mild    Essential hypertension    Hyperlipidemia    Left bundle branch block    Murmur    Osteoarthritis of both knees    Pericardial disease    Sciatica    Age-related cataract of right eye    Venous insufficiency    Bilateral leg edema    Stress-induced cardiomyopathy    History of aortic valve replacement with bioprosthetic valve    Persistent atrial fibrillation (HCC)    MSSA bacteremia - Resolved septic shock    Acute blood loss anemia - Gastric ulcer    Leukocytosis    Acute metabolic encephalopathy    Skin rash    Acute renal failure    Coagulopathy (HCC)    GI bleeding    Age-related nuclear cataract, bilateral    Open angle with borderline findings, low risk, bilateral    Presence of intraocular lens    Vitreous degeneration of both eyes    Left arm swelling    Dysphagia    Cellulitis/myositis of left forearm    Left internal jugular vein thrombus    Morbid obesity     Depression    Abnormal CT of the head    Gastric ulcer    Atrial fibrillation (HCC)    Pneumonia due to COVID-19 virus    Acute respiratory failure with hypoxia and hypercarbia (HCC)    Bacteremia    Hematuria    Acute kidney injury superimposed on CKD (HCC)    Anemia    Hypernatremia    Seizure (HCC)    History of stroke    Positive D dimer in a COVID postitive patient         Plan:  1  Symptom management   Will defer to critical care team at this time  2  Goals - Disease focused care  Will continue discussions regarding [address] 63 as patient's clinical presentation evolves  Evone Meckel wants patient to return to nursing home  She is agreeable to trach, PEG, dialysis, and "anything it takes" to get him better  Code Status: FULL - Level 1 - no limitations   Decisional apparatus:  Patient is not competent on my exam today    If competence is lost, patient's substitute decision maker would default to wife Fidencio Paige by [place] Act 865 -    Advance Directive / Living Will / POLST:  None on file     We appreciate the invitation to be involved in this patient's care  We will continue to follow with patient during his clinical course  Please do not hesitate to reach our on call provider through our clinic answering service at  should you have acute symptom control concerns  KATHY Dwyer  Palliative and Supportive Care  Clinic/Answering Service: [phone]  You can find me on [employer]! IDENTIFICATION:  Inpatient consult to Palliative Care  Consult performed by: KATHY Sparks  Consult ordered by: Drucilla Bence, MD      Physician Requesting Consult: Oleg Yoon MD  Reason for Consult / Principal Problem: goals  Hx and PE limited by: intubation, sedation    HISTORY OF PRESENT ILLNESS:       Janell Gupta is a 63 y o  male who presents with altered mental status from [facility]  He has a history of multiple CVA's with residual arm and leg weakness, CKD, obesity, and atrial fibrillation without anticoagulation  He is COVID+ on admission  He is intubated and sedated upon arrival to [facility], and Xferred to [facility] for aggressive cares when he became hypotensive  Since admission to [facility], he was able to be extubated on 1/18  He subsequently required reintubation on 2/1 after seizure activity was witnessed  He is currently sedated and requiring vasopressors to maintain BP  His kidney function has not returned to baseline and he is requiring diuretics for volume overload  On my exam, the patient is lying in bed, intubated, and sedated  He is unable to respond to questions but can follow commands weakly  Wife, Boyd Hayes, would like full care and anticipates his return to the nursing home  She would like to be able to visit him again and expects that he will make a full recovery    She is very supportive of him and reports that she understands his current situation  Discussed expected disease trajectory and prognosis  Explained disease focused care versus comfort care and hospice  Explained these topics in great detail  Sarah tee to express concerns, share their feelings about this and ask questions  Spent time confirming with patient's wife that she is agreeable to tracheostomy, PEG, dialysis, and long term care even if patient is unable to recover to his previous baseline  She reports that her rob will help them and they will not give up hope  She further reports that Harshad Ramon needs to get back to his model trains, which give him great pleasure  Review of Systems   Unable to perform ROS: intubated       Past Medical History:   Diagnosis Date    Allergic rhinitis     last assessed 9/12/12    Finger fracture, right     Closed fx of the middle phalanx of the right 5th finger  last assessed 1/30/14    GI bleed 2/22/2019    Hemorrhoids     last assessed 2/10/14    Hyperlipidemia     Hypertension     Stroke [facility])      Past Surgical History:   Procedure Laterality Date    AORTIC VALVE REPLACEMENT  07/30/2014    AVR with 25mm [facility] Ease bovine pericardial valve    CARDIAC CATHETERIZATION  07/18/2014    SLB left main-normal, circumflex-normal, ramus intermedius-normal, RCA was large and dominant giving rise to the PDA & a large posterolateral branch  No disease  last assessed 8/19/14     COLONOSCOPY N/A 2/25/2019    Procedure: COLONOSCOPY;  Surgeon: Laron Duckworth DO;  [facility]; Service: Gastroenterology    ESOPHAGOGASTRODUODENOSCOPY N/A 2/22/2019    Procedure: ESOPHAGOGASTRODUODENOSCOPY (EGD)-roadshow overnight;  Surgeon: Laron Duckworth DO;  [facility]; Service: Gastroenterology    ESOPHAGOGASTRODUODENOSCOPY N/A 2/23/2019    Procedure: ESOPHAGOGASTRODUODENOSCOPY (EGD); Surgeon: Christie Mo MD;  [facility];   Service: Gastroenterology   Pretty Sheehan ESOPHAGOGASTRODUODENOSCOPY N/A 2/25/2019    Procedure: ESOPHAGOGASTRODUODENOSCOPY (EGD); Surgeon: Michael Dickson DO;  [facility];   Service: Gastroenterology    IR NON-TUNNELED CENTRAL LINE PLACEMENT  3/1/2019    IR NON-TUNNELED CENTRAL LINE PLACEMENT  2/4/2019    IR TUNNELED DIALYSIS CATHETER CHECK/CHANGE/REPOSITION/ANGIOPLASTY  4/18/2019    IR TUNNELED DIALYSIS CATHETER PLACEMENT  2/4/2019    IR TUNNELED DIALYSIS CATHETER PLACEMENT  2/18/2019    KNEE ARTHROSCOPY      KNEE CARTILAGE SURGERY Left     medial meniscus tear     TESTICLE SURGERY      TONSILLECTOMY AND ADENOIDECTOMY      TOOTH EXTRACTION       Social History     Socioeconomic History    Marital status: /Civil Union     Spouse name: Not on file    Number of children: Not on file    Years of education: Not on file    Highest education level: Not on file   Occupational History    Not on file   Social Needs    Financial resource strain: Not on file    Food insecurity     Worry: Not on file     Inability: Not on file   [employer] needs     Medical: Not on file     Non-medical: Not on file   Tobacco Use    Smoking status: Never Smoker    Smokeless tobacco: Never Used   Substance and Sexual Activity    Alcohol use: Never     Frequency: Never     Comment: ocassion /never drank alcohol per [employer]     Drug use: No    Sexual activity: Not on file   Lifestyle    Physical activity     Days per week: Not on file     Minutes per session: Not on file    Stress: Not on file   Relationships    Social connections     Talks on phone: Not on file     Gets together: Not on file     Attends Muslim service: Not on file     Active member of club or organization: Not on file     Attends meetings of clubs or organizations: Not on file     Relationship status: Not on file    Intimate partner violence     Fear of current or ex partner: Not on file     Emotionally abused: Not on file     Physically abused: Not on file Forced sexual activity: Not on file   Other Topics Concern    Not on file   Social History Narrative    Not on file     Family History   Problem Relation Age of Onset    Lung cancer Mother     Heart disease Mother         pacemaker placement     Coronary artery disease Father     Hypertension Father     Heart attack Father     Cancer Family         bladder        MEDICATIONS / ALLERGIES:    all current active meds have been reviewed and current meds:   Current Facility-Administered Medications   Medication Dose Route Frequency    acetaminophen (TYLENOL) oral suspension 650 mg  650 mg Per NG Tube Q4H PRN    atorvastatin (LIPITOR) tablet 40 mg  40 mg Per NG Tube HS    bisacodyl (DULCOLAX) rectal suppository 10 mg  10 mg Rectal Daily PRN    chlorhexidine (PERIDEX) 0 12 % oral rinse 15 mL  15 mL Swish & Spit Q12H [address]    cholecalciferol (VITAMIN D3) tablet 2,000 Units  2,000 Units Per NG Tube Daily    dexamethasone (DECADRON) injection 6 mg  6 mg Intravenous Daily    docusate sodium (COLACE) capsule 100 mg  100 mg Oral BID    famotidine (PEPCID) oral suspension 20 mg  20 mg Per NG Tube BID    furosemide (LASIX) injection 20 mg  20 mg Intravenous BID (diuretic)    heparin (porcine) 25,000 units in 0 45% NaCl 250 mL infusion (premix)  3-30 Units/kg/hr (Order-Specific) Intravenous Titrated    heparin (porcine) injection 10,000 Units  10,000 Units Intravenous Q1H PRN    heparin (porcine) injection 5,000 Units  5,000 Units Intravenous Q1H PRN    levETIRAcetam (KEPPRA) 750 mg in sodium chloride 0 9 % 100 mL IVPB  750 mg Intravenous Q12H [address]    multivitamin with iron-minerals liquid 15 mL  15 mL Per NG Tube Daily    NOREPINEPHRINE 4 MG  ML NSS (CMPD ORDER) infusion  1-30 mcg/min Intravenous Titrated    polyethylene glycol (MIRALAX) packet 17 g  17 g Oral BID    propofol (DIPRIVAN) 1000 mg in 100 mL infusion (premix)  5-50 mcg/kg/min Intravenous Titrated    senna-docusate sodium (SENOKOT S) 8 6-50 mg per tablet 1 tablet  1 tablet Per NG Tube HS    sertraline (ZOLOFT) tablet 125 mg  125 mg Per NG Tube Daily    sodium chloride 0 9 % bolus 1,000 mL  1,000 mL Intravenous Once       Allergies   Allergen Reactions    Amiodarone      Developed Rash    Penicillins Rash     However, has subsequently tolerated Cefazolin and Cefepime     OBJECTIVE:    Physical Exam  Vitals signs and nursing note reviewed  Constitutional:       General: He is not in acute distress  Appearance: He is obese  He is ill-appearing  Comments: Frail, cachectic   HENT:      Head: Normocephalic and atraumatic  Right Ear: External ear normal       Left Ear: External ear normal       Mouth/Throat:      Pharynx: No oropharyngeal exudate (membranes tacky)  Eyes:      General:         Right eye: No discharge  Left eye: No discharge  Extraocular Movements: Extraocular movements intact  Conjunctiva/sclera: Conjunctivae normal       Comments: Does not open eyes during encounter  Neck:      Trachea: No tracheal deviation  Cardiovascular:      Rate and Rhythm: Normal rate  Pulmonary:      Effort: Pulmonary effort is normal  No respiratory distress  Breath sounds: No stridor  Comments: Synchronous with ventilator  Abdominal:      Palpations: Abdomen is soft  Musculoskeletal:         General: Swelling present  Right lower leg: Edema present  Left lower leg: Edema present  Comments: Bilateral upper extremities edematous  Skin:     General: Skin is warm  Coloration: Skin is pale  Findings: No erythema or rash  Neurological:      Motor: Weakness present  Comments: Not alert, not interactive  Face appear generally symmetric  Remains sedated  Psychiatric:      Comments: Cannot participate in exam          Lab Results:   I have personally reviewed pertinent labs  , CBC:   Lab Results   Component Value Date    WBC 15 39 (H) 02/03/2021    HGB 12 0 02/03/2021    HCT 41 6 02/03/2021    MCV 78 (L) 02/03/2021     02/03/2021    MCH 22 5 (L) 02/03/2021    MCHC 28 8 (L) 02/03/2021    RDW 19 3 (H) 02/03/2021    MPV 11 5 02/03/2021    NRBC 0 02/03/2021   , CMP:   Lab Results   Component Value Date    SODIUM 144 02/03/2021    K 4 0 02/03/2021     02/03/2021    CO2 35 (H) 02/03/2021    BUN 60 (H) 02/03/2021    CREATININE 1 86 (H) 02/03/2021    CALCIUM 8 4 02/03/2021    AST 16 02/03/2021    ALT 20 02/03/2021    ALKPHOS 64 02/03/2021    EGFR 38 02/03/2021   , BMP:  Lab Results   Component Value Date    SODIUM 144 02/03/2021    K 4 0 02/03/2021     02/03/2021    CO2 35 (H) 02/03/2021    BUN 60 (H) 02/03/2021    CREATININE 1 86 (H) 02/03/2021    GLUC 101 02/03/2021    CALCIUM 8 4 02/03/2021    AGAP 5 02/03/2021    EGFR 38 02/03/2021   , PT/PTT:  Lab Results   Component Value Date    PTT 99 (H) 02/03/2021     Poor renal function prevents use of morphine    Elevated WBC might be attributable to stress rxn  Imaging Studies: reviewed  EKG, Pathology, and Other Studies: none pertinent    Counseling / Coordination of Care    Total floor / unit time spent today 80+ minutes  Greater than 50% of total time was spent with the patient and / or family counseling and / or coordination of care  A description of the counseling / coordination of care: Introduced role of Palliative Medicine  Reviewed expected disease trajectory, prognosis, code status, and comfort care versus disease directed therapy  Collaborated with nursing staff  Spent time supportive listening to spouse regarding frustrations of diagnosis and treatment options available at this time

## 2021-02-03 NOTE — PROGRESS NOTES
Daily Progress Note - Critical Care   Kanu Johnson 63 y o  male MRN: [id number]  Unit/Bed#: ICU 08 Encounter: [id number]        ----------------------------------------------------------------------------------------  HPI/24hr events: Volume Overloaded  IV lasix BID  Trying to wean Levo off  Xrays show volume overload     ---------------------------------------------------------------------------------------  SUBJECTIVE  Intubated    Review of Systems  Review of systems was unable to be performed secondary to intubated  ---------------------------------------------------------------------------------------  Assessment and Plan:    Neuro:    Diagnosis: Seizure due to multiple contributiuons Stroke Recrudescence, Infection, Encephalopathy  o Plan: Consulted Neuro- appreciate recs  - Keppra down to 750mg q12hrs  - Ativan PRN for seizure like activity  o Continue propofol- wean as necessary  o Await EEG- If ongoing signs of seizure activity or status on EEG- require transfer to [facility] for continuous vEEG  o RASS goal -1 to -2  o Continue talking with Palliative Care   Depression  o Continue home Zoloft      CV:    Diagnosis: Afib  o Plan: Currently in sinus rhythm  o Not on AC at home secondary to GI bleed history  o Continue telemetry  o Cardizem held due to hypotension  o Restart lopressor if bp tolerated   Diagnosis: Hx of htn  o Plan: Home bumex- replaced with lasix   o Dose of lasix 2/1 for presumed volume overload  o Low dose vasopressors 2/1 for hypotension following intubation and sedation     o Remains on levophed at 1mcg/min- Wean if possible with goal >65 MaP   Diagnosis: Hx of HLD  o Continue Atorvastatin   Volume Overload  o Home Bumex discontinued and placed on Lasix 40mg BID; further decreased to Lasix 20mg BID to meet goal -1L      Pulm:   Diagnosis: Acute respiratory failure with hypoxia and hypercarbia most likely due to COVID  o Plan: Reintubated 2/1 for airway protection (following seizure activity  o Current settings AC/VC 18/450/10/95  o Patient was transitioned to APRV mode without reverse I:E ratio  ABG 2/2 had P/F ratio of 60 despute PEEP 10  Change to AC/VC   He was on bipap prior to intubation for worsening hypoxia and increased WOB  o Titrate FiO2 for SPO2>90%  o COVID therapy for severe pathway protocol  o ABG this morning  o Palliative Care Consult      GI:    Diagnosis: Dysphagia  o Plan: On tube feeds Jevity 1 2 55 mL goal rate  o Previously recommended pureed diet by SLP      :    Diagnosis: PATRICK on CKD  o Plan: Baseline creatinine is 1 1-1 3mg/dL  o Creatinine was 1 8 yesterday after additional lasix  o Home bumex replaced with lasix      F/E/N:    Plan: Fluids:    Electrolyte: Replete Mg>2, K>4, Phos>3   Nutrition: Tube Feeds      Heme/Onc:    Diagnosis: Positive D-dimer (COVID patient  o Plan: Concern for PE/DVT  o LE duplex- no DVT found  o On heparin gtt      Endo:    Diagnosis: No acute issues      ID:    Diagnosis: Pneumonia due to COVID-19  o Plan: Positive on 1/17- Also convalescent plasma  o Not candidate for remdesivir  o Continue decadron 6mg daily  o VC/Zinc completed  On MV  o Continue statin  o Continue pepcid  o Heparin Drip  o Broad spectrum abx started yesterday for concern for superimposed bacterial pneumonia  PCT normal; Abx discontinued due to negative procal and cultures negative at 24 hrs      MSK/Skin:    Diagnosis: Prone when possible      Disposition: Continue Critical Care   Code Status: Level 1 - Full Code  ---------------------------------------------------------------------------------------  ICU CORE MEASURES    Prophylaxis   VTE Pharmacologic Prophylaxis: Heparin Drip  VTE Mechanical Prophylaxis: sequential compression device  Stress Ulcer Prophylaxis: Famotidine PO    ABCDE Protocol (if indicated)  Plan to perform spontaneous awakening trial today? No  Plan to perform spontaneous breathing trial today? No  Obvious barriers to extubation? No  CAM-ICU: Negative    Invasive Devices Review  Invasive Devices     Peripheral Intravenous Line            Long-Dwell Peripheral IV (Midline)  Left Cephalic 8 days    Peripheral IV 21 Right Forearm 1 day          Drain            Urethral Catheter Straight-tip 16 Fr  16 days    NG/OG/Enteral Tube Orogastric 1 day          Airway            ETT  Cuffed; Hi-Lo 8 mm 1 day              Can any invasive devices be discontinued today? No  ---------------------------------------------------------------------------------------  OBJECTIVE    Vitals   Vitals:    21 1839 21 1900 21   BP:  101/57 125/64 (!) 88/55   Pulse:  70 71 77   Resp:  20 21 20   Temp:  97 9 °F (36 6 °C) 97 9 °F (36 6 °C) 98 1 °F (36 7 °C)   TempSrc:       SpO2: 95% 96% (!) 89% (!) 89%   Weight:       Height:         Temp (24hrs), Av °F (36 1 °C), Min:96 3 °F (35 7 °C), Max:98 1 °F (36 7 °C)  Current: Temperature: 98 1 °F (36 7 °C)      Respiratory:  SpO2: SpO2: 93 %  Nasal Cannula O2 Flow Rate (L/min): 50 L/min    Invasive/non-invasive ventilation settings   Respiratory    Lab Data (Last 4 hours)    None         O2/Vent Data (Last 4 hours)    None                Physical Exam  Vitals signs and nursing note reviewed  Constitutional:       Appearance: He is well-developed  He is obese  He is ill-appearing  HENT:      Head: Normocephalic and atraumatic  Eyes:      Conjunctiva/sclera: Conjunctivae normal    Neck:      Musculoskeletal: Neck supple  Cardiovascular:      Rate and Rhythm: Normal rate and regular rhythm  Heart sounds: No murmur  Pulmonary:      Effort: Pulmonary effort is normal  No respiratory distress  Breath sounds: Rales present  Abdominal:      Palpations: Abdomen is soft  Tenderness: There is no abdominal tenderness  Musculoskeletal:      Right lower leg: 3+ Pitting Edema present  Left lower leg: 3+ Pitting Edema present     Skin:     General: Skin is warm and dry           Laboratory and Diagnostics:  Results from last 7 days   Lab Units 02/02/21  1250 02/02/21  0442 02/01/21  1058 01/30/21  0629 01/29/21  0552 01/28/21  0512 01/27/21  0610   WBC Thousand/uL 13 36* 10 68* 12 07* 6 58 4 68 4 31 4 37   HEMOGLOBIN g/dL 11 7* 10 6* 11 9* 11 1* 9 9* 11 2* 10 7*   HEMATOCRIT % 40 7 36 3* 41 3 39 3 34 0* 38 1 36 7   PLATELETS Thousands/uL 222 204 222 195 173 184 180   NEUTROS PCT %  --  91*  --  91* 82* 85* 86*   MONOS PCT %  --  4  --  3* 6 7 9   MONO PCT %  --   --  2*  --   --   --   --      Results from last 7 days   Lab Units 02/02/21  0442 02/01/21  1058 02/01/21  0541 01/31/21  0525 01/30/21  0629 01/29/21  0552 01/28/21  0512   SODIUM mmol/L 142 145 145 143 141 139 140   POTASSIUM mmol/L 3 4* 4 3 4 2 4 1 4 2 3 3* 3 8   CHLORIDE mmol/L 105 105 105 105 104 106 105   CO2 mmol/L 32 36* 34* 32 31 27 29   ANION GAP mmol/L 5 4 6 6 6 6 6   BUN mg/dL 64* 68* 64* 58* 57* 53* 66*   CREATININE mg/dL 1 81* 2 05* 1 94* 2 10* 2 12* 1 92* 2 25*   CALCIUM mg/dL 8 1* 8 5 8 3 7 9* 8 0* 7 0* 8 6   GLUCOSE RANDOM mg/dL 135 104 108 124 128 141* 126   ALT U/L 22 23  --   --  27 21 28   AST U/L 15 14  --   --  21 17 17   ALK PHOS U/L 56 60  --   --  58 45* 55   ALBUMIN g/dL 2 8* 3 3*  --   --  2 3* 1 9* 2 4*   TOTAL BILIRUBIN mg/dL 0 51 0 64  --   --  0 38 0 44 0 56     Results from last 7 days   Lab Units 01/27/21  0611   MAGNESIUM mg/dL 2 8*      Results from last 7 days   Lab Units 02/02/21 2013 02/02/21  1250   INR   --  1 22*   PTT seconds >210* 31      Results from last 7 days   Lab Units 02/01/21  1058   TROPONIN I ng/mL 0 03     Results from last 7 days   Lab Units 02/02/21  0442   LACTIC ACID mmol/L 1 2     ABG:  Results from last 7 days   Lab Units 02/02/21  1420   PH ART  7 434   PCO2 ART mm Hg 47 0*   PO2 ART mm Hg 71 5*   HCO3 ART mmol/L 30 8*   BASE EXC ART mmol/L 5 7   ABG SOURCE  Radial, Left     VBG:  Results from last 7 days   Lab Units 02/02/21  1420   ABG SOURCE Radial, Left     Results from last 7 days   Lab Units 02/01/21  1058   PROCALCITONIN ng/ml 0 13       Micro  Results from last 7 days   Lab Units 02/01/21  1208 02/01/21  1200   BLOOD CULTURE  Received in Microbiology Lab  Culture in Progress  Received in Microbiology Lab  Culture in Progress  EKG: None  Imaging:  I have personally reviewed pertinent reports  Intake and Output  I/O       02/01 0701 - 02/02 0700 02/02 0701 - 02/03 0700    P  O  0     I V  (mL/kg) 1009 7 (6) 553 1 (3 3)    NG/     IV Piggyback 1250 950    Total Intake(mL/kg) 2469 7 (14 7) 1503 1 (8 9)    Urine (mL/kg/hr) 2600 (0 6) 4800 (2)    Total Output 2600 4800    Net -130 3 -3297              UOP: -5930 ml/hr   Net: 3444  Since admit: 92383    Height and Weights   Height: 5' 11" (180 3 cm)  IBW: 75 3 kg  Body mass index is 51 6 kg/m²  Weight (last 2 days)     Date/Time   Weight    02/01/21 1019   (!) 168 (369 93)                Nutrition       Diet Orders   (From admission, onward)             Start     Ordered    02/01/21 1024  Diet NPO  Diet effective now     Question Answer Comment   Diet Type NPO    RD to adjust diet per protocol? Yes        02/01/21 1025    01/17/21 1719  Room Service  Once     Question:  Type of Service  Answer:  Room Service- Not Appropriate    01/17/21 1718              TF currently running at 55 ml/hr with a goal of 55 ml/hr   Formula: Jevity      Active Medications  Scheduled Meds:  Current Facility-Administered Medications   Medication Dose Route Frequency Provider Last Rate    acetaminophen  650 mg Per NG Tube Q4H PRN More Hogan PA-C      atorvastatin  40 mg Per NG Tube HS More Hogan PA-C      bisacodyl  10 mg Rectal Daily PRN More Hogan PA-C      bumetanide  2 mg Per NG Tube BID More Hogan PA-C      cefepime  2,000 mg Intravenous Q12H More Hogan PA-C Stopped (02/02/21 2000)    chlorhexidine  15 mL Swish & Spit Q12H [address] More Hogan PA-C      cholecalciferol  2,000 Units Per NG Tube Daily Rogena Nestor, PA-C      dexamethasone  6 mg Intravenous Daily Rogena Nestor, PA-C      docusate sodium  100 mg Oral BID Rogena Nestor, PA-C      epoprostenol (VELETRI) inhalation solution 35921 ng/mL  6 25 ng/kg/min (Ideal) Inhalation Titrated [place] Poot, DO Stopped (02/02/21 2055)    famotidine  20 mg Per NG Tube BID Rogena Nestor, PA-C      heparin (porcine)  3-30 Units/kg/hr (Order-Specific) Intravenous Titrated Marybelle Mendoza, DO 18 Units/kg/hr (02/02/21 1331)    heparin (porcine)  10,000 Units Intravenous Q1H PRN Marybelle Mendoza, DO      heparin (porcine)  5,000 Units Intravenous Q1H PRN Marybelle Mendoza, DO      levETIRAcetam  750 mg Intravenous Q12H [address] Elfredia Mulling, PA-C 750 mg (02/02/21 2027)    methocarbamol  500 mg Oral Q8H PRN Rogena Nestor, PA-C      metoprolol  5 mg Intravenous Q6H PRN Rogena Nestor, PA-C      metroNIDAZOLE  500 mg Intravenous Q8H Rogena Nestor, PA-C Stopped (02/02/21 2000)    multivitamin with iron-minerals  15 mL Per NG Tube Daily Rogena Nestor, PA-C      norepinephrine  1-30 mcg/min Intravenous Titrated Rogena Nestor, PA-C 2 mcg/min (02/02/21 2045)    polyethylene glycol  17 g Oral BID Rogena Nestor, PA-C      propofol  5-50 mcg/kg/min Intravenous Titrated Rogena Nestor, PA-C 20 mcg/kg/min (02/02/21 1955)    senna-docusate sodium  1 tablet Per NG Tube HS Rogena Nestor, PA-C      sertraline  125 mg Per NG Tube Daily Rogena Nestor, PA-C      sodium chloride  1,000 mL Intravenous Once Rogena Nestor, PA-C      traMADol  50 mg Oral Once Rogena Nestor, PA-C      vancomycin  15 mg/kg (Adjusted) Intravenous Daily PRN Rogena Nestor, PA-C       Continuous Infusions:  epoprostenol (VELETRI) inhalation solution 85616 ng/mL, 6 25 ng/kg/min (Ideal), Last Rate: Stopped (02/02/21 2055)  heparin (porcine), 3-30 Units/kg/hr (Order-Specific), Last Rate: 18 Units/kg/hr (02/02/21 0071)  norepinephrine, 1-30 mcg/min, Last Rate: 2 mcg/min (02/02/21 2045)  propofol, 5-50 mcg/kg/min, Last Rate: 20 mcg/kg/min (02/02/21 1955)      PRN Meds:   acetaminophen, 650 mg, Q4H PRN  bisacodyl, 10 mg, Daily PRN  heparin (porcine), 10,000 Units, Q1H PRN  heparin (porcine), 5,000 Units, Q1H PRN  methocarbamol, 500 mg, Q8H PRN  metoprolol, 5 mg, Q6H PRN  vancomycin, 15 mg/kg (Adjusted), Daily PRN        Allergies   Allergies   Allergen Reactions    Amiodarone      Developed Rash    Penicillins Rash     However, has subsequently tolerated Cefazolin and Cefepime     ---------------------------------------------------------------------------------------  Advance Directive and Living Will:      Power of :    POLST:    ---------------------------------------------------------------------------------------  Care Time Delivered:   Upon my evaluation, this patient had a high probability of imminent or life-threatening deterioration due to Increased oxygen demand, which required my direct attention, intervention, and personal management  I have personally provided 30 minutes (8 to 8:30) of critical care time, exclusive of procedures, teaching, family meetings, and any prior time recorded by providers other than myself  Drucilla Bence, MD      Portions of the record may have been created with voice recognition software  Occasional wrong word or "sound a like" substitutions may have occurred due to the inherent limitations of voice recognition software    Read the chart carefully and recognize, using context, where substitutions have occurred

## 2021-02-04 ENCOUNTER — APPOINTMENT (INPATIENT)
Dept: RADIOLOGY | Facility: HOSPITAL | Age: 61
DRG: 130 | End: 2021-02-04
Payer: COMMERCIAL

## 2021-02-04 LAB
ALBUMIN SERPL BCP-MCNC: 2.8 G/DL (ref 3.5–5)
ALP SERPL-CCNC: 63 U/L (ref 46–116)
ALT SERPL W P-5'-P-CCNC: 16 U/L (ref 12–78)
ANION GAP SERPL CALCULATED.3IONS-SCNC: 7 MMOL/L (ref 4–13)
APTT PPP: 73 SECONDS (ref 23–37)
APTT PPP: 76 SECONDS (ref 23–37)
AST SERPL W P-5'-P-CCNC: 13 U/L (ref 5–45)
BASE EXCESS BLDA CALC-SCNC: 3.9 MMOL/L
BASE EXCESS BLDA CALC-SCNC: 4 MMOL/L
BASE EXCESS BLDA CALC-SCNC: 4.5 MMOL/L
BASOPHILS # BLD AUTO: 0.06 THOUSANDS/ΜL (ref 0–0.1)
BASOPHILS NFR BLD AUTO: 0 % (ref 0–1)
BILIRUB SERPL-MCNC: 0.6 MG/DL (ref 0.2–1)
BUN SERPL-MCNC: 59 MG/DL (ref 5–25)
CALCIUM ALBUM COR SERPL-MCNC: 9.1 MG/DL (ref 8.3–10.1)
CALCIUM SERPL-MCNC: 8.1 MG/DL (ref 8.3–10.1)
CHLORIDE SERPL-SCNC: 107 MMOL/L (ref 100–108)
CO2 SERPL-SCNC: 30 MMOL/L (ref 21–32)
CREAT SERPL-MCNC: 1.86 MG/DL (ref 0.6–1.3)
CRP SERPL QL: 42.1 MG/L
D DIMER PPP FEU-MCNC: 3.41 UG/ML FEU
EOSINOPHIL # BLD AUTO: 0.38 THOUSAND/ΜL (ref 0–0.61)
EOSINOPHIL NFR BLD AUTO: 2 % (ref 0–6)
ERYTHROCYTE [DISTWIDTH] IN BLOOD BY AUTOMATED COUNT: 19.9 % (ref 11.6–15.1)
FERRITIN SERPL-MCNC: 233 NG/ML (ref 8–388)
GFR SERPL CREATININE-BSD FRML MDRD: 38 ML/MIN/1.73SQ M
GLUCOSE SERPL-MCNC: 101 MG/DL (ref 65–140)
GLUCOSE SERPL-MCNC: 126 MG/DL (ref 65–140)
GLUCOSE SERPL-MCNC: 127 MG/DL (ref 65–140)
GLUCOSE SERPL-MCNC: 144 MG/DL (ref 65–140)
GLUCOSE SERPL-MCNC: 149 MG/DL (ref 65–140)
GLUCOSE SERPL-MCNC: 164 MG/DL (ref 65–140)
HCO3 BLDA-SCNC: 29.3 MMOL/L (ref 22–28)
HCO3 BLDA-SCNC: 29.8 MMOL/L (ref 22–28)
HCO3 BLDA-SCNC: 30.5 MMOL/L (ref 22–28)
HCT VFR BLD AUTO: 38.9 % (ref 36.5–49.3)
HGB BLD-MCNC: 11.8 G/DL (ref 12–17)
IMM GRANULOCYTES # BLD AUTO: 0.47 THOUSAND/UL (ref 0–0.2)
IMM GRANULOCYTES NFR BLD AUTO: 2 % (ref 0–2)
LYMPHOCYTES # BLD AUTO: 0.52 THOUSANDS/ΜL (ref 0.6–4.47)
LYMPHOCYTES NFR BLD AUTO: 2 % (ref 14–44)
MAGNESIUM SERPL-MCNC: 1.6 MG/DL (ref 1.6–2.6)
MCH RBC QN AUTO: 23.4 PG (ref 26.8–34.3)
MCHC RBC AUTO-ENTMCNC: 30.3 G/DL (ref 31.4–37.4)
MCV RBC AUTO: 77 FL (ref 82–98)
MONOCYTES # BLD AUTO: 0.82 THOUSAND/ΜL (ref 0.17–1.22)
MONOCYTES NFR BLD AUTO: 3 % (ref 4–12)
NEUTROPHILS # BLD AUTO: 22.59 THOUSANDS/ΜL (ref 1.85–7.62)
NEUTS SEG NFR BLD AUTO: 91 % (ref 43–75)
NRBC BLD AUTO-RTO: 0 /100 WBCS
NT-PROBNP SERPL-MCNC: 784 PG/ML
O2 CT BLDA-SCNC: 14.7 ML/DL (ref 16–23)
O2 CT BLDA-SCNC: 14.7 ML/DL (ref 16–23)
O2 CT BLDA-SCNC: 15 ML/DL (ref 16–23)
OXYHGB MFR BLDA: 83.6 % (ref 94–97)
OXYHGB MFR BLDA: 85.7 % (ref 94–97)
OXYHGB MFR BLDA: 90.2 % (ref 94–97)
PCO2 BLDA: 47 MM HG (ref 36–44)
PCO2 BLDA: 50 MM HG (ref 36–44)
PCO2 BLDA: 52.3 MM HG (ref 36–44)
PH BLDA: 7.38 [PH] (ref 7.35–7.45)
PH BLDA: 7.39 [PH] (ref 7.35–7.45)
PH BLDA: 7.41 [PH] (ref 7.35–7.45)
PHOSPHATE SERPL-MCNC: 3.1 MG/DL (ref 2.3–4.1)
PLATELET # BLD AUTO: 232 THOUSANDS/UL (ref 149–390)
PMV BLD AUTO: 11.8 FL (ref 8.9–12.7)
PO2 BLDA: 49.1 MM HG (ref 75–129)
PO2 BLDA: 54.5 MM HG (ref 75–129)
PO2 BLDA: 61.6 MM HG (ref 75–129)
POTASSIUM SERPL-SCNC: 3.7 MMOL/L (ref 3.5–5.3)
PROCALCITONIN SERPL-MCNC: 0.38 NG/ML
PROT SERPL-MCNC: 5.5 G/DL (ref 6.4–8.2)
RBC # BLD AUTO: 5.04 MILLION/UL (ref 3.88–5.62)
SODIUM SERPL-SCNC: 144 MMOL/L (ref 136–145)
SPECIMEN SOURCE: ABNORMAL
WBC # BLD AUTO: 24.84 THOUSAND/UL (ref 4.31–10.16)

## 2021-02-04 PROCEDURE — NC001 PR NO CHARGE: Performed by: NURSE PRACTITIONER

## 2021-02-04 PROCEDURE — 87070 CULTURE OTHR SPECIMN AEROBIC: CPT | Performed by: PSYCHIATRY & NEUROLOGY

## 2021-02-04 PROCEDURE — 83735 ASSAY OF MAGNESIUM: CPT | Performed by: NURSE PRACTITIONER

## 2021-02-04 PROCEDURE — 85025 COMPLETE CBC W/AUTO DIFF WBC: CPT | Performed by: NURSE PRACTITIONER

## 2021-02-04 PROCEDURE — 99233 SBSQ HOSP IP/OBS HIGH 50: CPT | Performed by: INTERNAL MEDICINE

## 2021-02-04 PROCEDURE — 71045 X-RAY EXAM CHEST 1 VIEW: CPT

## 2021-02-04 PROCEDURE — 36620 INSERTION CATHETER ARTERY: CPT | Performed by: NURSE PRACTITIONER

## 2021-02-04 PROCEDURE — 85730 THROMBOPLASTIN TIME PARTIAL: CPT | Performed by: INTERNAL MEDICINE

## 2021-02-04 PROCEDURE — 84145 PROCALCITONIN (PCT): CPT | Performed by: NURSE PRACTITIONER

## 2021-02-04 PROCEDURE — 82948 REAGENT STRIP/BLOOD GLUCOSE: CPT

## 2021-02-04 PROCEDURE — 99291 CRITICAL CARE FIRST HOUR: CPT | Performed by: INTERNAL MEDICINE

## 2021-02-04 PROCEDURE — 94003 VENT MGMT INPAT SUBQ DAY: CPT

## 2021-02-04 PROCEDURE — 93005 ELECTROCARDIOGRAM TRACING: CPT

## 2021-02-04 PROCEDURE — 86140 C-REACTIVE PROTEIN: CPT | Performed by: NURSE PRACTITIONER

## 2021-02-04 PROCEDURE — 83880 ASSAY OF NATRIURETIC PEPTIDE: CPT | Performed by: PHYSICIAN ASSISTANT

## 2021-02-04 PROCEDURE — 87205 SMEAR GRAM STAIN: CPT | Performed by: PSYCHIATRY & NEUROLOGY

## 2021-02-04 PROCEDURE — 87186 SC STD MICRODIL/AGAR DIL: CPT | Performed by: PSYCHIATRY & NEUROLOGY

## 2021-02-04 PROCEDURE — 94762 N-INVAS EAR/PLS OXIMTRY CONT: CPT

## 2021-02-04 PROCEDURE — 94760 N-INVAS EAR/PLS OXIMETRY 1: CPT

## 2021-02-04 PROCEDURE — 87077 CULTURE AEROBIC IDENTIFY: CPT | Performed by: PSYCHIATRY & NEUROLOGY

## 2021-02-04 PROCEDURE — 82728 ASSAY OF FERRITIN: CPT | Performed by: NURSE PRACTITIONER

## 2021-02-04 PROCEDURE — 94150 VITAL CAPACITY TEST: CPT

## 2021-02-04 PROCEDURE — 85379 FIBRIN DEGRADATION QUANT: CPT | Performed by: NURSE PRACTITIONER

## 2021-02-04 PROCEDURE — 87040 BLOOD CULTURE FOR BACTERIA: CPT | Performed by: PHYSICIAN ASSISTANT

## 2021-02-04 PROCEDURE — 82805 BLOOD GASES W/O2 SATURATION: CPT | Performed by: PHYSICIAN ASSISTANT

## 2021-02-04 PROCEDURE — 84100 ASSAY OF PHOSPHORUS: CPT | Performed by: NURSE PRACTITIONER

## 2021-02-04 PROCEDURE — 80053 COMPREHEN METABOLIC PANEL: CPT | Performed by: NURSE PRACTITIONER

## 2021-02-04 PROCEDURE — 82805 BLOOD GASES W/O2 SATURATION: CPT | Performed by: NURSE PRACTITIONER

## 2021-02-04 RX ORDER — VANCOMYCIN HYDROCHLORIDE 1 G/200ML
10 INJECTION, SOLUTION INTRAVENOUS DAILY PRN
Status: DISCONTINUED | OUTPATIENT
Start: 2021-02-05 | End: 2021-02-04

## 2021-02-04 RX ORDER — SENNOSIDES 8.6 MG
1 TABLET ORAL
Status: DISCONTINUED | OUTPATIENT
Start: 2021-02-04 | End: 2021-02-04

## 2021-02-04 RX ORDER — POTASSIUM CHLORIDE 14.9 MG/ML
20 INJECTION INTRAVENOUS ONCE
Status: COMPLETED | OUTPATIENT
Start: 2021-02-04 | End: 2021-02-04

## 2021-02-04 RX ORDER — MAGNESIUM SULFATE HEPTAHYDRATE 40 MG/ML
2 INJECTION, SOLUTION INTRAVENOUS ONCE
Status: COMPLETED | OUTPATIENT
Start: 2021-02-04 | End: 2021-02-04

## 2021-02-04 RX ORDER — LACTULOSE 20 G/30ML
10 SOLUTION ORAL DAILY
Status: DISCONTINUED | OUTPATIENT
Start: 2021-02-04 | End: 2021-02-08

## 2021-02-04 RX ORDER — DEXAMETHASONE SODIUM PHOSPHATE 4 MG/ML
6 INJECTION, SOLUTION INTRA-ARTICULAR; INTRALESIONAL; INTRAMUSCULAR; INTRAVENOUS; SOFT TISSUE EVERY 12 HOURS SCHEDULED
Status: DISCONTINUED | OUTPATIENT
Start: 2021-02-04 | End: 2021-02-06

## 2021-02-04 RX ORDER — FUROSEMIDE 10 MG/ML
20 INJECTION INTRAMUSCULAR; INTRAVENOUS
Status: DISCONTINUED | OUTPATIENT
Start: 2021-02-04 | End: 2021-02-05

## 2021-02-04 RX ORDER — SODIUM POLYSTYRENE SULFONATE 4.1 MEQ/G
15 POWDER, FOR SUSPENSION ORAL; RECTAL 3 TIMES DAILY
Status: COMPLETED | OUTPATIENT
Start: 2021-02-04 | End: 2021-02-04

## 2021-02-04 RX ORDER — FUROSEMIDE 10 MG/ML
40 INJECTION INTRAMUSCULAR; INTRAVENOUS
Status: DISCONTINUED | OUTPATIENT
Start: 2021-02-04 | End: 2021-02-04

## 2021-02-04 RX ADMIN — FUROSEMIDE 40 MG: 10 INJECTION, SOLUTION INTRAMUSCULAR; INTRAVENOUS at 07:56

## 2021-02-04 RX ADMIN — SODIUM POLYSTYRENE SULFONATE 15 G: 4.1 POWDER, FOR SUSPENSION ORAL; RECTAL at 16:52

## 2021-02-04 RX ADMIN — MAGNESIUM SULFATE HEPTAHYDRATE 2 G: 40 INJECTION, SOLUTION INTRAVENOUS at 07:16

## 2021-02-04 RX ADMIN — LEVETIRACETAM 750 MG: 100 INJECTION, SOLUTION INTRAVENOUS at 22:16

## 2021-02-04 RX ADMIN — LACTULOSE 10 G: 10 SOLUTION ORAL at 12:24

## 2021-02-04 RX ADMIN — FAMOTIDINE 20 MG: 40 POWDER, FOR SUSPENSION ORAL at 08:01

## 2021-02-04 RX ADMIN — FENTANYL CITRATE 50 MCG: 50 INJECTION INTRAMUSCULAR; INTRAVENOUS at 01:22

## 2021-02-04 RX ADMIN — DEXAMETHASONE SODIUM PHOSPHATE 6 MG: 4 INJECTION, SOLUTION INTRA-ARTICULAR; INTRALESIONAL; INTRAMUSCULAR; INTRAVENOUS; SOFT TISSUE at 08:01

## 2021-02-04 RX ADMIN — PROPOFOL 20 MCG/KG/MIN: 10 INJECTION, EMULSION INTRAVENOUS at 11:09

## 2021-02-04 RX ADMIN — PROPOFOL 20 MCG/KG/MIN: 10 INJECTION, EMULSION INTRAVENOUS at 05:55

## 2021-02-04 RX ADMIN — HEPARIN SODIUM 10 UNITS/KG/HR: 10000 INJECTION, SOLUTION INTRAVENOUS at 17:46

## 2021-02-04 RX ADMIN — POLYETHYLENE GLYCOL 3350 17 G: 17 POWDER, FOR SOLUTION ORAL at 08:01

## 2021-02-04 RX ADMIN — VANCOMYCIN HYDROCHLORIDE 2000 MG: 1 INJECTION, POWDER, LYOPHILIZED, FOR SOLUTION INTRAVENOUS at 13:42

## 2021-02-04 RX ADMIN — PROPOFOL 20 MCG/KG/MIN: 10 INJECTION, EMULSION INTRAVENOUS at 21:52

## 2021-02-04 RX ADMIN — SODIUM POLYSTYRENE SULFONATE 15 G: 4.1 POWDER, FOR SUSPENSION ORAL; RECTAL at 12:24

## 2021-02-04 RX ADMIN — PROPOFOL 20 MCG/KG/MIN: 10 INJECTION, EMULSION INTRAVENOUS at 16:50

## 2021-02-04 RX ADMIN — CHLORHEXIDINE GLUCONATE 0.12% ORAL RINSE 15 ML: 1.2 LIQUID ORAL at 22:15

## 2021-02-04 RX ADMIN — Medication 2000 UNITS: at 08:01

## 2021-02-04 RX ADMIN — MULTIVITAMIN 15 ML: LIQUID ORAL at 08:01

## 2021-02-04 RX ADMIN — NOREPINEPHRINE BITARTRATE 6 MCG/MIN: 1 INJECTION, SOLUTION, CONCENTRATE INTRAVENOUS at 20:21

## 2021-02-04 RX ADMIN — DESMOPRESSIN ACETATE 40 MG: 0.2 TABLET ORAL at 22:16

## 2021-02-04 RX ADMIN — LEVETIRACETAM 750 MG: 100 INJECTION, SOLUTION INTRAVENOUS at 08:35

## 2021-02-04 RX ADMIN — CEFEPIME HYDROCHLORIDE 2000 MG: 2 INJECTION, POWDER, FOR SOLUTION INTRAVENOUS at 22:47

## 2021-02-04 RX ADMIN — FUROSEMIDE 20 MG: 10 INJECTION, SOLUTION INTRAVENOUS at 12:24

## 2021-02-04 RX ADMIN — DOCUSATE SODIUM AND SENNOSIDES 1 TABLET: 8.6; 5 TABLET ORAL at 22:16

## 2021-02-04 RX ADMIN — POTASSIUM CHLORIDE 20 MEQ: 14.9 INJECTION, SOLUTION INTRAVENOUS at 07:16

## 2021-02-04 RX ADMIN — FUROSEMIDE 20 MG: 10 INJECTION, SOLUTION INTRAVENOUS at 17:45

## 2021-02-04 RX ADMIN — CHLORHEXIDINE GLUCONATE 0.12% ORAL RINSE 15 ML: 1.2 LIQUID ORAL at 08:01

## 2021-02-04 RX ADMIN — FAMOTIDINE 20 MG: 40 POWDER, FOR SUSPENSION ORAL at 17:46

## 2021-02-04 RX ADMIN — DEXAMETHASONE SODIUM PHOSPHATE 6 MG: 4 INJECTION, SOLUTION INTRA-ARTICULAR; INTRALESIONAL; INTRAMUSCULAR; INTRAVENOUS; SOFT TISSUE at 22:16

## 2021-02-04 RX ADMIN — CEFEPIME HYDROCHLORIDE 2000 MG: 2 INJECTION, POWDER, FOR SOLUTION INTRAVENOUS at 12:28

## 2021-02-04 RX ADMIN — POLYETHYLENE GLYCOL 3350 17 G: 17 POWDER, FOR SOLUTION ORAL at 22:16

## 2021-02-04 RX ADMIN — PROPOFOL 20 MCG/KG/MIN: 10 INJECTION, EMULSION INTRAVENOUS at 01:23

## 2021-02-04 RX ADMIN — SODIUM POLYSTYRENE SULFONATE 15 G: 4.1 POWDER, FOR SUSPENSION ORAL; RECTAL at 22:16

## 2021-02-04 NOTE — PROGRESS NOTES
[address] [facility] NOTE   Hermann Roque 63 y o  male MRN: [id number]  Unit/Bed#: ICU 08 Encounter: [id number]  Reason for Consult:  Acute kidney injury on CKD    ASSESSMENT and PLAN:  1  Acute kidney injury (POA):  · Etiology:  Likely ATN in the setting of COVID-19 pneumonia, +/- RUFINA and possible Vanco related toxicity  · Creatinine down to 1 86 with 6 L urine output in the last 24 hours  · Urinalysis:  Large blood, innumerable RBCs, fine and coarse granular casts, 2+ protein  · Renal function improving, now plateauing near 0 9-5 9  Yesterday diuretic dose reduced due to robust response  Blood pressure acceptable although intermittently below 122 systolic  · Plan/recommendations:  ? Continue diuretic Lasix 20 mg every 8 hours  2  Chronic kidney disease, stage III:    · Baseline creatinine 1 1-1 3 mg/dL     · He follows with a nephrology group in the [facility]  3  Volume status:    · Chest x-ray 2/1:  Viral pneumonia changes related to COVID-19  · Good urine output, keep output greater than intake  4  COVID-19:  Severe pathway     · Decompensated 02/01/2021 requiring transfer to ICU/intubation/mechanical ventilation  · Chest x-ray 2/4:  Awaiting final read  Reviewed  Does not appear to show improvement  · Ventilator dependency:  PO2 49 1 on ABG this morning  Increased oxygen requirements, FiO2 100%  · Management per primary team  5  Anemia:  Hemoglobin stable  6  Mild hypokalemia:  Replete  7  Encephalopathy:  CT of the head showing stable old infarcts and chronic microangiopathic changes no acute changes  EEG:  Moderate diffuse cerebral dysfunction  Neuro following for management of seizures  Palliative Care following     SUBJECTIVE / 24H INTERVAL HISTORY:  Intubated      OBJECTIVE:  Current Weight: Weight - Scale: (!) 168 kg (369 lb 14 9 oz)  Vitals:    02/04/21 0800 02/04/21 0900 02/04/21 1000 02/04/21 1154   BP:       BP Location:       Pulse: (!) 106 (!) 109 101    Resp: (!) 45 (!) 31 (!) 32    Temp: 99 °F (37 2 °C) 99 °F (37 2 °C) 99 °F (37 2 °C)    TempSrc:       SpO2: 91% (!) 86% (!) 86% 92%   Weight:       Height:           Intake/Output Summary (Last 24 hours) at 2/4/2021 1222  Last data filed at 2/4/2021 1001  Gross per 24 hour   Intake 2374 01 ml   Output 2300 ml   Net 74 01 ml   Seen through reviewing window  General:  Critically ill male  Skin: no rash  Eyes:  Eyes closed  ENT:  Oral endotracheal tube  Chest:  Equal chest expansion  CVS:  Normal sinus rhythm noted on the monitor  Abdomen:  No significant distension  Extremities: + edema LE b/l  :  bo  Neuro:  No spontaneous movement    Medications:    Current Facility-Administered Medications:     acetaminophen (TYLENOL) oral suspension 650 mg, 650 mg, Per NG Tube, Q4H PRN, Leroy Martin PA-C    atorvastatin (LIPITOR) tablet 40 mg, 40 mg, Per NG Tube, HS, Leroy Martin PA-C, 40 mg at 02/03/21 2155    bisacodyl (DULCOLAX) rectal suppository 10 mg, 10 mg, Rectal, Daily PRN, Leroy Martin PA-C    cefepime (MAXIPIME) 2,000 mg in dextrose 5 % 50 mL IVPB, 2,000 mg, Intravenous, Q12H, Libby Kinsey PA-C    chlorhexidine (PERIDEX) 0 12 % oral rinse 15 mL, 15 mL, Swish & Spit, Q12H [address], Leroy Martin PA-C, 15 mL at 02/04/21 0801    cholecalciferol (VITAMIN D3) tablet 2,000 Units, 2,000 Units, Per NG Tube, Daily, Leroy Martin PA-C, 2,000 Units at 02/04/21 0801    dexamethasone (DECADRON) injection 6 mg, 6 mg, Intravenous, Q12H [address], Libby Kinsey PA-C    famotidine (PEPCID) oral suspension 20 mg, 20 mg, Per NG Tube, BID, Leroy Martin PA-C, 20 mg at 02/04/21 0801    fentanyl citrate (PF) 100 MCG/2ML 50 mcg, 50 mcg, Intravenous, Q2H PRN, KATHY Morton, 50 mcg at 02/04/21 0122    furosemide (LASIX) injection 20 mg, 20 mg, Intravenous, TID (diuretic), Sarwat Green MD    heparin (porcine) 25,000 units in 0 45% NaCl 250 mL infusion (premix), 3-30 Units/kg/hr (Order-Specific), Intravenous, Titrated, Tito Davis DO, Last Rate: 12 5 mL/hr at 02/03/21 2013, 10 Units/kg/hr at 02/03/21 2013    heparin (porcine) injection 10,000 Units, 10,000 Units, Intravenous, Q1H PRN, Tito Davis DO    heparin (porcine) injection 5,000 Units, 5,000 Units, Intravenous, Q1H PRN, Tito Davis DO    lactulose oral solution 10 g, 10 g, Oral, Daily, Sangeeta Du MD    levETIRAcetam (KEPPRA) 750 mg in sodium chloride 0 9 % 100 mL IVPB, 750 mg, Intravenous, Q12H [facility] HERBERT Richard, Last Rate: 400 mL/hr at 02/04/21 0835, 750 mg at 02/04/21 0835    [COMPLETED] zinc sulfate (ZINCATE) capsule 220 mg, 220 mg, Per NG Tube, Daily, 220 mg at 01/24/21 0824 **FOLLOWED BY** multivitamin with iron-minerals liquid 15 mL, 15 mL, Per NG Tube, Daily, Shama Grande PA-C, 15 mL at 02/04/21 0801    NOREPINEPHRINE 4 MG  ML NSS (CMPD ORDER) infusion, 1-30 mcg/min, Intravenous, Titrated, Shama Grande PA-C, Last Rate: 22 5 mL/hr at 02/04/21 1128, 6 mcg/min at 02/04/21 1128    polyethylene glycol (MIRALAX) packet 17 g, 17 g, Oral, BID, Shama Grande PA-C, 17 g at 02/04/21 0801    propofol (DIPRIVAN) 1000 mg in 100 mL infusion (premix), 5-50 mcg/kg/min, Intravenous, Titrated, Shama Grande PA-C, Last Rate: 20 2 mL/hr at 02/04/21 0555, 20 mcg/kg/min at 02/04/21 0555    senna-docusate sodium (SENOKOT S) 8 6-50 mg per tablet 1 tablet, 1 tablet, Per NG Tube, HS, Shama Grande PA-C, 1 tablet at 02/03/21 2153    sodium polystyrene (KAYEXALATE) powder 15 g, 15 g, Oral, TID, James Acosta PA-C    Laboratory Results:  Results from last 7 days   Lab Units 02/04/21  0459 02/03/21  0504 02/03/21  0457 02/02/21  1250 02/02/21  0955 02/02/21  0442 02/01/21  2059 02/01/21  1058 02/01/21  0541 01/31/21  0525 01/30/21  0629 01/29/21  0552   WBC Thousand/uL 24 84* 15 39*  --  13 36*  --  10 68*  --  12 07*  --   --  6 58 4 68   HEMOGLOBIN g/dL 11 8* 12 0  --  11 7*  --  10 6*  --  11 9*  --   --  11 1* 9 9*   I STAT HEMOGLOBIN g/dl  --   --   --   --  11 2*  --  13 3  --   --   --   --   --    HEMATOCRIT % 38 9 41 6  --  40 7  --  36 3*  --  41 3  --   --  39 3 34 0*   HEMATOCRIT, ISTAT %  --   --   --   --  33*  --  39  --   --   --   --   --    PLATELETS Thousands/uL 232 224  --  222  --  204  --  222  --   --  195 173   POTASSIUM mmol/L 3 7  --  4 0  --   --  3 4*  --  4 3 4 2 4 1 4 2 3 3*   CHLORIDE mmol/L 107  --  104  --   --  105  --  105 105 105 104 106   CO2 mmol/L 30  --  35*  --   --  32  --  36* 34* 32 31 27   CO2, I-STAT mmol/L  --   --   --   --  37*  --  36*  --   --   --   --   --    BUN mg/dL 59*  --  60*  --   --  64*  --  68* 64* 58* 57* 53*   CREATININE mg/dL 1 86*  --  1 86*  --   --  1 81*  --  2 05* 1 94* 2 10* 2 12* 1 92*   CALCIUM mg/dL 8 1*  --  8 4  --   --  8 1*  --  8 5 8 3 7 9* 8 0* 7 0*   MAGNESIUM mg/dL 1 6  --  1 6  --   --   --   --   --   --   --   --   --    PHOSPHORUS mg/dL 3 1  --  2 7  --   --   --   --   --   --   --   --   --    GLUCOSE, ISTAT mg/dl  --   --   --   --  105  --  148*  --   --   --   --   --

## 2021-02-04 NOTE — PROGRESS NOTES
Vancomycin Assessment    Talat Albert is a 63 y o  male who is currently receiving vancomycin 20 mg/kg loading dose, followed by 12 5 mg/kg/dose (1500 mg) daily PRN random vanco < 20  Dosing is based on adjusted body weight d/t obesity  Relevant clinical data and objective history reviewed:  Creatinine   Date Value Ref Range Status   02/04/2021 1 86 (H) 0 60 - 1 30 mg/dL Final     Comment:     Standardized to IDMS reference method   02/03/2021 1 86 (H) 0 60 - 1 30 mg/dL Final     Comment:     Standardized to IDMS reference method   02/02/2021 1 81 (H) 0 60 - 1 30 mg/dL Final     Comment:     Standardized to IDMS reference method   04/09/2015 0 67 0 60 - 1 30 mg/dL Final     Comment:     Standardized to IDMS reference method   11/15/2014 0 70 0 60 - 1 30 mg/dL Final     Comment:     Standardized to IDMS reference method   11/02/2014 0 86 0 60 - 1 30 mg/dL Final     Comment:     Standardized to IDMS reference method     Vancomycin Rm   Date Value Ref Range Status   02/02/2021 19 4 ug/mL Final     BP 94/52   Pulse (!) 108   Temp 99 °F (37 2 °C)   Resp (!) 30   Ht 5' 11" (1 803 m)   Wt (!) 168 kg (369 lb 14 9 oz)   SpO2 93%   BMI 51 60 kg/m²   I/O last 3 completed shifts: In: 3042 2 [I V :1657  2; NG/GT:310; IV Piggyback:450; Feedings:625]  Out: 3930 [Urine:3930]  Lab Results   Component Value Date/Time    BUN 59 (H) 02/04/2021 04:59 AM    BUN 17 04/09/2015 11:41 AM    WBC 24 84 (H) 02/04/2021 04:59 AM    WBC 6 71 11/02/2014 05:56 AM    HGB 11 8 (L) 02/04/2021 04:59 AM    HGB 11 9 (L) 11/02/2014 05:56 AM    HCT 38 9 02/04/2021 04:59 AM    HCT 39 3 11/02/2014 05:56 AM    MCV 77 (L) 02/04/2021 04:59 AM    MCV 81 (L) 11/02/2014 05:56 AM     02/04/2021 04:59 AM     11/02/2014 05:56 AM     Temp Readings from Last 3 Encounters:   02/04/21 99 °F (37 2 °C)   01/17/21 100 4 °F (38 °C)   05/24/19 99 9 °F (37 7 °C)     Vancomycin Days of Therapy: 1    Assessment/Plan  The patient is currently on vancomycin utilizing pulse dosing  Baseline risks associated with therapy include: pre-existing renal impairment, concomitant nephrotoxic medications, and advanced age  The patient is receiving a 2000 mg loading dose with 1500 mg daily PRN random vanco level < 20 d/t PATRICK  Baseline creatinine 1 1-1 3  Pharmacy will continue to follow closely for s/sx of nephrotoxicity, infusion reactions, and appropriateness of therapy  BMP and CBC will be ordered per protocol  Plan for random vanco level tomorrow 2/5 with AM labs  Pharmacy will continue to follow the patients culture results and clinical progress daily      Kyle Plunkett, Pharmacist

## 2021-02-04 NOTE — PROCEDURES
Arterial Line Insertion    Date/Time: 2/4/2021 2:42 AM  Performed by: KATHY Merritt  Authorized by: KATHY Merritt     Patient location:  Bedside and ICU  Consent:     Consent obtained:  Emergent situation  Universal protocol:     Required blood products, implants, devices, and special equipment available: yes      Site/side marked: yes      Immediately prior to procedure a time out was called: yes      Patient identity confirmed:  Arm band and hospital-assigned identification number  Indications:     Indications: hemodynamic monitoring and multiple ABGs    Pre-procedure details:     Skin preparation:  Chlorhexidine    Preparation: Patient was prepped and draped in sterile fashion    Anesthesia (see MAR for exact dosages): Anesthesia method:  None  Procedure details:     Location / Tip of Catheter:  Radial    Laterality:  Right    Needle gauge:  20 G    Placement technique:  Seldinger    Number of attempts:  2    Successful placement: yes      Transducer: waveform confirmed    Post-procedure details:     Post-procedure:  Sterile dressing applied and sutured    CMS:  Unable to assess    Patient tolerance of procedure:   Tolerated well, no immediate complications

## 2021-02-04 NOTE — PROGRESS NOTES
Daily Progress Note - Critical Care   Alfredo Crawford 63 y o  male MRN: [id number]  Unit/Bed#: ICU 08 Encounter: [id number]        ----------------------------------------------------------------------------------------  HPI/24hr events: Desynchronous breathing yesterday and PRN fentanyl given  A-line placed pO2 in 40s  PEEP placed at +14  Bloody secretions noted in chest tube- heparin drip not stopped     ---------------------------------------------------------------------------------------  SUBJECTIVE  Intubated    Review of Systems  Review of systems was unable to be performed secondary to intubated  ---------------------------------------------------------------------------------------  Assessment and Plan:    Neuro:   · Diagnosis: Seizure due to multiple contributiuons Stroke Recrudescence, Infection, Encephalopathy  ? Plan: Consulted Neuro- appreciate recs  ? Keppra down to 750mg q12hrs  ? Ativan PRN for seizure like activity  ? Continue propofol- wean as necessary  ? EEG 2/3- Routine EEG are too slow suggesting moderate diffuse cerebral dysfunction- No spike waves mentioned  ? RASS goal -1 to -2  ? Appreciate Neurology input  ? Continue talking with Palliative Care  ? Propofol 20  ? Precedex held  · Depression  ? Continue home Zoloft        CV:   · Diagnosis: Afib  ? Plan: Currently in sinus rhythm  ? Not on AC at home secondary to GI bleed history  ? Continue telemetry  ? Cardizem held due to hypotension  ? Restart lopressor if bp tolerated  ? Ordered EKG due to tachycardia with widened QRS complexes Afib  · Diagnosis: Hx of htn  ? Plan: Home bumex- replaced with lasix   ? Dose of lasix 2/1 for presumed volume overload  ? Low dose vasopressors 2/1 for hypotension following intubation and sedation  ? Remains on levophed at 4mcg/min- Wean if possible with goal >99 MaP or systolic >509P  · Diagnosis: Hx of HLD  ? Continue Atorvastatin  · Volume Overload  ?  Home Bumex discontinued and placed on Lasix 40mg BID; further decreased to Lasix 20mg BID to meet goal -1L  ? Did not meet goal net negative -340mL  ? Increase back to Lasix 40mg BID        Pulm:  · Diagnosis: Acute respiratory failure with hypoxia and hypercarbia most likely due to COVID  ? Plan: Reintubated 2/1 for airway protection (following seizure activity  ? Current settings AC/VC 18/450/10/95  ? Patient was transitioned to APRV mode without reverse I:E ratio  ABG 2/2 had P/F ratio of 60 despute PEEP 10  Change to AC/VC   He was on bipap prior to intubation for worsening hypoxia and increased WOB  ? Titrate FiO2 for SPO2>90%  ? COVID therapy for severe pathway protocol  ? ABG 2/4- pH 7 412/pCO2 47/ pO2 49 1/ HCO3 29 3        GI:   · Diagnosis: Dysphagia  ? Plan: On tube feeds Jevity 1 2 55 mL goal rate  ? Previously recommended pureed diet by SLP        :   · Diagnosis: PATRICK on CKD  ? Plan: Baseline creatinine is 1 1-1 3mg/dL  ? Creatinine stable at 1 86 today  ? Home bumex replaced with lasix  ? Increased back to 40mg BID lasix        F/E/N:   · Plan: Fluids:   · Propofol 20  · Levophed 4  · Heparin 10- may need to modify due to bloody secretions  · Electrolyte: Replete Mg>2, K>4, Phos>3  · Nutrition: Tube Feeds        Heme/Onc:   · Diagnosis: Positive D-dimer (COVID patient  ? Plan: Concern for PE/DVT  ? LE duplex- no DVT found  ? On heparin gtt        Endo:   · Diagnosis: No acute issues        ID:   · Diagnosis: Pneumonia due to COVID-19  ? Plan: Positive on 1/17- Also convalescent plasma  ? Not candidate for remdesivir  ? CRP 10 6->11 5->42 1  ? NT-BNP 1994->2293->784  ? D-dimer 19 4->7 15->3 41  ? Ferritin 242 normal   ? Continue decadron 6mg daily  ? VC/Zinc completed  On MV  ? Continue statin  ? Continue pepcid  ? Heparin Drip  ? Broad spectrum abx started 2/2 for concern for superimposed bacterial pneumonia  PCT normal; Abx discontinued due to negative procal and cultures negative at 24 hrs- no growth at 24 hrs  ?  Procal 0 45- restart abx?        MSK/Skin:   · Diagnosis: Prone when possible         Disposition: Continue Critical Care   Code Status: Level 1 - Full Code  ---------------------------------------------------------------------------------------  ICU CORE MEASURES    Prophylaxis   VTE Pharmacologic Prophylaxis: Heparin Drip  VTE Mechanical Prophylaxis: sequential compression device  Stress Ulcer Prophylaxis: Famotidine IV     ABCDE Protocol (if indicated)  Plan to perform spontaneous awakening trial today? No  Plan to perform spontaneous breathing trial today? No  Obvious barriers to extubation? No  CAM-ICU: Negative    Invasive Devices Review  Invasive Devices     Peripheral Intravenous Line            Long-Dwell Peripheral IV (Midline)  Left Cephalic 9 days    Peripheral IV 21 Right Forearm 2 days          Drain            Urethral Catheter Straight-tip 16 Fr  17 days    NG/OG/Enteral Tube Orogastric 2 days          Airway            ETT  Cuffed; Hi-Lo 8 mm 2 days              Can any invasive devices be discontinued today?  No  ---------------------------------------------------------------------------------------  OBJECTIVE    Vitals   Vitals:    21 2200   BP: (!) 86/47  100/54 97/55   BP Location:       Pulse: 99  93 99   Resp: 20  (!) 24 20   Temp: 98 8 °F (37 1 °C)  98 4 °F (36 9 °C) 98 4 °F (36 9 °C)   TempSrc:       SpO2: 94% 93% 90% 93%   Weight:       Height:         Temp (24hrs), Av 2 °F (36 8 °C), Min:97 7 °F (36 5 °C), Max:98 8 °F (37 1 °C)  Current: Temperature: 98 4 °F (36 9 °C)      Respiratory:  SpO2: SpO2: 90 %, SpO2 Activity: SpO2 Activity: At Rest, SpO2 Device: O2 Device: Ventilator, Capnography:    Nasal Cannula O2 Flow Rate (L/min): 50 L/min    Invasive/non-invasive ventilation settings   Respiratory    Lab Data (Last 4 hours)    None         O2/Vent Data (Last 4 hours)    None                Physical Exam    Laboratory and Diagnostics:  Results from last 7 days   Lab Units 02/03/21  0504 02/02/21  1250 02/02/21  0955 02/02/21 0442 02/01/21 2059 02/01/21  1058 01/30/21  0629 01/29/21  0552 01/28/21  0512   WBC Thousand/uL 15 39* 13 36*  --  10 68*  --  12 07* 6 58 4 68 4 31   HEMOGLOBIN g/dL 12 0 11 7*  --  10 6*  --  11 9* 11 1* 9 9* 11 2*   I STAT HEMOGLOBIN g/dl  --   --  11 2*  --  13 3  --   --   --   --    HEMATOCRIT % 41 6 40 7  --  36 3*  --  41 3 39 3 34 0* 38 1   HEMATOCRIT, ISTAT %  --   --  33*  --  39  --   --   --   --    PLATELETS Thousands/uL 224 222  --  204  --  222 195 173 184   NEUTROS PCT %  --   --   --  91*  --   --  91* 82* 85*   BANDS PCT % 2  --   --   --   --   --   --   --   --    MONOS PCT %  --   --   --  4  --   --  3* 6 7   MONO PCT % 3*  --   --   --   --  2*  --   --   --      Results from last 7 days   Lab Units 02/03/21  0457 02/02/21  0955 02/02/21 0442 02/01/21 2059 02/01/21  1058 02/01/21  0541 01/31/21  0525 01/30/21  0629 01/29/21  0552 01/28/21  0512   SODIUM mmol/L 144  --  142  --  145 145 143 141 139 140   POTASSIUM mmol/L 4 0  --  3 4*  --  4 3 4 2 4 1 4 2 3 3* 3 8   CHLORIDE mmol/L 104  --  105  --  105 105 105 104 106 105   CO2 mmol/L 35*  --  32  --  36* 34* 32 31 27 29   CO2, I-STAT mmol/L  --  37*  --  36*  --   --   --   --   --   --    ANION GAP mmol/L 5  --  5  --  4 6 6 6 6 6   BUN mg/dL 60*  --  64*  --  68* 64* 58* 57* 53* 66*   CREATININE mg/dL 1 86*  --  1 81*  --  2 05* 1 94* 2 10* 2 12* 1 92* 2 25*   CALCIUM mg/dL 8 4  --  8 1*  --  8 5 8 3 7 9* 8 0* 7 0* 8 6   GLUCOSE RANDOM mg/dL 101  --  135  --  104 108 124 128 141* 126   ALT U/L 20  --  22  --  23  --   --  27 21 28   AST U/L 16  --  15  --  14  --   --  21 17 17   ALK PHOS U/L 64  --  56  --  60  --   --  58 45* 55   ALBUMIN g/dL 3 1*  --  2 8*  --  3 3*  --   --  2 3* 1 9* 2 4*   TOTAL BILIRUBIN mg/dL 0 58  --  0 51  --  0 64  --   --  0 38 0 44 0 56     Results from last 7 days   Lab Units 02/03/21  0457   MAGNESIUM mg/dL 1 6   PHOSPHORUS mg/dL 2 7      Results from last 7 days   Lab Units 02/03/21  1954 02/03/21  1223 02/03/21  0719 02/02/21  2237 02/02/21 2013 02/02/21  1250   INR   --   --   --   --   --  1 22*   PTT seconds 72* 99* 163* 210* >210* 31      Results from last 7 days   Lab Units 02/01/21  1058   TROPONIN I ng/mL 0 03     Results from last 7 days   Lab Units 02/02/21  0442   LACTIC ACID mmol/L 1 2     ABG:  Results from last 7 days   Lab Units 02/03/21  1135   PH ART  7 440   PCO2 ART mm Hg 43 8   PO2 ART mm Hg 48 3*   HCO3 ART mmol/L 29 1*   BASE EXC ART mmol/L 4 4   ABG SOURCE  Radial, Left     VBG:  Results from last 7 days   Lab Units 02/03/21  1135   ABG SOURCE  Radial, Left     Results from last 7 days   Lab Units 02/03/21  0457 02/01/21  1058   PROCALCITONIN ng/ml 0 45* 0 13       Micro  Results from last 7 days   Lab Units 02/01/21  1208 02/01/21  1200   BLOOD CULTURE  No Growth at 24 hrs  No Growth at 24 hrs  EKG: N/A  Imaging:  I have personally reviewed pertinent reports  Intake and Output  I/O       02/02 0701 - 02/03 0700 02/03 0701 - 02/04 0700    P  O   0    I V  (mL/kg) 1095 1 (6 5) 552 4 (3 3)    NG/ 60    IV Piggyback 1200 200    Feedings  245    Total Intake(mL/kg) 2485 1 (14 8) 1057 4 (6 3)    Urine (mL/kg/hr) 5930 (1 5) 1600 (0 6)    Total Output 5930 1600    Net -3444 9 -542  6              UOP: -2300 ml/hr   Net : -340  3  Since Admit: -13 5L    Height and Weights   Height: 5' 11" (180 3 cm)  IBW: 75 3 kg  Body mass index is 51 6 kg/m²    Weight (last 2 days)     Date/Time   Weight    02/01/21 1019   (!) 168 (369 93)                Nutrition       Diet Orders   (From admission, onward)             Start     Ordered    02/03/21 0344  Diet Enteral/Parenteral; Tube Feeding No Oral Diet; Jevity 1 2 Denny; Continuous; 55  Diet effective now     Question Answer Comment   Diet Type Enteral/Parenteral    Enteral/Parenteral Tube Feeding No Oral Diet    Tube Feeding Formula: Jevity 1 2 Denny Bolus/Cyclic/Continuous Continuous    Tube Feeding Goal Rate (mL/hr): 55    RD to adjust diet per protocol? Yes        02/03/21 0351    01/17/21 1719  Room Service  Once     Question:  Type of Service  Answer:  Room Service- Not Appropriate    01/17/21 1718              TF currently running at  ml/hr with a goal of 55 ml/hr   Formula: Jevity 1 2      Active Medications  Scheduled Meds:  Current Facility-Administered Medications   Medication Dose Route Frequency Provider Last Rate    acetaminophen  650 mg Per NG Tube Q4H PRN Cass Libman, PA-C      atorvastatin  40 mg Per NG Tube HS Cass Libman, PA-C      bisacodyl  10 mg Rectal Daily PRN Cass Libman, PA-C      chlorhexidine  15 mL [facility] Cass Libman, [place]      cholecalciferol  2,000 Units Per NG Tube Daily Cass Libman, PA-C      dexamethasone  6 mg Intravenous Daily Cass Libman, PA-C      docusate sodium  100 mg Oral BID Cass Libman, PA-C      famotidine  20 mg Per NG Tube BID Cass Libman, PA-C      fentanyl citrate (PF)  50 mcg Intravenous Q2H PRN KATHY Flores      furosemide  20 mg Intravenous BID (diuretic) Billie Balderas MD      heparin (porcine)  3-30 Units/kg/hr (Order-Specific) Intravenous Titrated Ana Proffer, DO 10 Units/kg/hr (02/03/21 2013)    heparin (porcine)  10,000 Units Intravenous Q1H PRN [place] Proffer, DO      heparin (porcine)  5,000 Units Intravenous Q1H PRN Ana Proffer, DO      levETIRAcetam  750 mg Intravenous Q12H [address] Derjordy Marks PA-C Stopped (02/03/21 2045)    multivitamin with iron-minerals  15 mL Per NG Tube Daily Cass Libman, PA-C      norepinephrine  1-30 mcg/min Intravenous Titrated Cass Libman, PA-C 2 mcg/min (02/03/21 1505)    polyethylene glycol  17 g Oral BID Cass Libman, PA-C      propofol  5-50 mcg/kg/min Intravenous Titrated Cass Libman, PA-C 15 mcg/kg/min (02/03/21 2012)    senna-docusate sodium  1 tablet Per NG Tube HS Paris Molina PA-C       Continuous Infusions:  heparin (porcine), 3-30 Units/kg/hr (Order-Specific), Last Rate: 10 Units/kg/hr (02/03/21 2013)  norepinephrine, 1-30 mcg/min, Last Rate: 2 mcg/min (02/03/21 1505)  propofol, 5-50 mcg/kg/min, Last Rate: 15 mcg/kg/min (02/03/21 2012)      PRN Meds:   acetaminophen, 650 mg, Q4H PRN  bisacodyl, 10 mg, Daily PRN  fentanyl citrate (PF), 50 mcg, Q2H PRN  heparin (porcine), 10,000 Units, Q1H PRN  heparin (porcine), 5,000 Units, Q1H PRN        Allergies   Allergies   Allergen Reactions    Amiodarone      Developed Rash    Penicillins Rash     However, has subsequently tolerated Cefazolin and Cefepime     ---------------------------------------------------------------------------------------  Advance Directive and Living Will:      Power of :    POLST:    ---------------------------------------------------------------------------------------  Care Time Delivered:   Upon my evaluation, this patient had a high probability of imminent or life-threatening deterioration due to Increased oxygen demand, which required my direct attention, intervention, and personal management  I have personally provided 30 minutes (7 to 7:30) of critical care time, exclusive of procedures, teaching, family meetings, and any prior time recorded by providers other than myself  Mariam Marc MD      Portions of the record may have been created with voice recognition software  Occasional wrong word or "sound a like" substitutions may have occurred due to the inherent limitations of voice recognition software    Read the chart carefully and recognize, using context, where substitutions have occurred

## 2021-02-04 NOTE — QUICK NOTE
Routine EEG on 2/3/2021 revealed slow background activities suggesting moderate diffuse cerebral dysfunction of nonspecific etiology, but no seizures or epileptiform discharges  Patient continues to be intubated and sedated on propofol  Patient is not ready to be weaned off of vent yet  Palliative care following  No clinical seizure-like activity per critical care team   Continue Keppra 750 mg BID  Neurology will continue following peripherally at this time, and will reassess once patient is extubated  If patient demonstrates seizure like activity or if there is concern for status, please reach out to Neurology

## 2021-02-04 NOTE — PROCEDURES
Arterial Line Insertion    Date/Time: 2/3/2021 11:32 PM  Performed by: Tracey Sheikh PA-C  Authorized by: Tracey Sheikh PA-C     Patient location:  ICU and bedside  Other Assisting Provider: No    Consent:     Consent obtained:  Emergent situation  Universal protocol:     Relevant documents present and verified: yes      Test results available and properly labeled: yes      Radiology Images displayed and confirmed  If images not available, report reviewed: yes      Required blood products, implants, devices, and special equipment available: yes      Site/side marked: yes      Immediately prior to procedure a time out was called: yes      Patient identity confirmed:  Arm band and hospital-assigned identification number  Indications:     Indications: multiple ABGs and frequent labs / infusion    Pre-procedure details:     Skin preparation:  Chlorhexidine  Anesthesia (see MAR for exact dosages): Anesthesia method:  None  Procedure details:     Location / Tip of Catheter:  Radial    Laterality:  Right    Jeison's test performed: no      Needle gauge:  22 G    Placement technique:  Ultrasound guided and Seldinger    Number of attempts:  3    Successful placement: no    Post-procedure details:     Patient tolerance of procedure:   Tolerated well, no immediate complications

## 2021-02-05 ENCOUNTER — APPOINTMENT (INPATIENT)
Dept: RADIOLOGY | Facility: HOSPITAL | Age: 61
DRG: 130 | End: 2021-02-05
Payer: COMMERCIAL

## 2021-02-05 LAB
ALBUMIN SERPL BCP-MCNC: 2.3 G/DL (ref 3.5–5)
ALP SERPL-CCNC: 67 U/L (ref 46–116)
ALT SERPL W P-5'-P-CCNC: 19 U/L (ref 12–78)
ANION GAP SERPL CALCULATED.3IONS-SCNC: 5 MMOL/L (ref 4–13)
APTT PPP: 51 SECONDS (ref 23–37)
APTT PPP: 70 SECONDS (ref 23–37)
AST SERPL W P-5'-P-CCNC: 19 U/L (ref 5–45)
ATRIAL RATE: 120 BPM
BASE EXCESS BLDA CALC-SCNC: 3 MMOL/L
BASE EXCESS BLDA CALC-SCNC: 3.9 MMOL/L
BASE EXCESS BLDA CALC-SCNC: 4.7 MMOL/L
BASOPHILS # BLD MANUAL: 0 THOUSAND/UL (ref 0–0.1)
BASOPHILS NFR MAR MANUAL: 0 % (ref 0–1)
BILIRUB SERPL-MCNC: 0.54 MG/DL (ref 0.2–1)
BUN SERPL-MCNC: 54 MG/DL (ref 5–25)
CALCIUM ALBUM COR SERPL-MCNC: 9.1 MG/DL (ref 8.3–10.1)
CALCIUM SERPL-MCNC: 7.7 MG/DL (ref 8.3–10.1)
CHLORIDE SERPL-SCNC: 106 MMOL/L (ref 100–108)
CO2 SERPL-SCNC: 32 MMOL/L (ref 21–32)
CREAT SERPL-MCNC: 1.68 MG/DL (ref 0.6–1.3)
EOSINOPHIL # BLD MANUAL: 0 THOUSAND/UL (ref 0–0.4)
EOSINOPHIL NFR BLD MANUAL: 0 % (ref 0–6)
ERYTHROCYTE [DISTWIDTH] IN BLOOD BY AUTOMATED COUNT: 19.9 % (ref 11.6–15.1)
GFR SERPL CREATININE-BSD FRML MDRD: 43 ML/MIN/1.73SQ M
GLUCOSE SERPL-MCNC: 116 MG/DL (ref 65–140)
GLUCOSE SERPL-MCNC: 139 MG/DL (ref 65–140)
GLUCOSE SERPL-MCNC: 147 MG/DL (ref 65–140)
GLUCOSE SERPL-MCNC: 153 MG/DL (ref 65–140)
GLUCOSE SERPL-MCNC: 193 MG/DL (ref 65–140)
HCO3 BLDA-SCNC: 29.5 MMOL/L (ref 22–28)
HCO3 BLDA-SCNC: 29.9 MMOL/L (ref 22–28)
HCO3 BLDA-SCNC: 30.9 MMOL/L (ref 22–28)
HCT VFR BLD AUTO: 34.7 % (ref 36.5–49.3)
HGB BLD-MCNC: 10.3 G/DL (ref 12–17)
HOROWITZ INDEX BLDA+IHG-RTO: 90 MM[HG]
HOROWITZ INDEX BLDA+IHG-RTO: 90 MM[HG]
HYPERCHROMIA BLD QL SMEAR: PRESENT
LYMPHOCYTES # BLD AUTO: 0.22 THOUSAND/UL (ref 0.6–4.47)
LYMPHOCYTES # BLD AUTO: 1 % (ref 14–44)
MCH RBC QN AUTO: 23.4 PG (ref 26.8–34.3)
MCHC RBC AUTO-ENTMCNC: 29.7 G/DL (ref 31.4–37.4)
MCV RBC AUTO: 79 FL (ref 82–98)
MICROCYTES BLD QL AUTO: PRESENT
MONOCYTES # BLD AUTO: 0.22 THOUSAND/UL (ref 0–1.22)
MONOCYTES NFR BLD: 1 % (ref 4–12)
NEUTROPHILS # BLD MANUAL: 21.4 THOUSAND/UL (ref 1.85–7.62)
NEUTS SEG NFR BLD AUTO: 98 % (ref 43–75)
NRBC BLD AUTO-RTO: 0 /100 WBCS
O2 CT BLDA-SCNC: 14.3 ML/DL (ref 16–23)
O2 CT BLDA-SCNC: 14.4 ML/DL (ref 16–23)
O2 CT BLDA-SCNC: 14.8 ML/DL (ref 16–23)
OXYHGB MFR BLDA: 87 % (ref 94–97)
OXYHGB MFR BLDA: 91.6 % (ref 94–97)
OXYHGB MFR BLDA: 94.6 % (ref 94–97)
PCO2 BLDA: 51.7 MM HG (ref 36–44)
PCO2 BLDA: 53.4 MM HG (ref 36–44)
PCO2 BLDA: 55 MM HG (ref 36–44)
PEEP RESPIRATORY: 14 CM[H2O]
PEEP RESPIRATORY: 14 CM[H2O]
PH BLDA: 7.35 [PH] (ref 7.35–7.45)
PH BLDA: 7.38 [PH] (ref 7.35–7.45)
PH BLDA: 7.38 [PH] (ref 7.35–7.45)
PLATELET # BLD AUTO: 196 THOUSANDS/UL (ref 149–390)
PLATELET BLD QL SMEAR: ADEQUATE
PMV BLD AUTO: 11.8 FL (ref 8.9–12.7)
PO2 BLDA: 57.5 MM HG (ref 75–129)
PO2 BLDA: 72.5 MM HG (ref 75–129)
PO2 BLDA: 79.2 MM HG (ref 75–129)
POTASSIUM SERPL-SCNC: 3.5 MMOL/L (ref 3.5–5.3)
PROT SERPL-MCNC: 5.3 G/DL (ref 6.4–8.2)
QRS AXIS: 83 DEGREES
QRSD INTERVAL: 152 MS
QT INTERVAL: 284 MS
QTC INTERVAL: 456 MS
RBC # BLD AUTO: 4.4 MILLION/UL (ref 3.88–5.62)
SODIUM SERPL-SCNC: 143 MMOL/L (ref 136–145)
SPECIMEN SOURCE: ABNORMAL
T WAVE AXIS: 269 DEGREES
TOTAL CELLS COUNTED SPEC: 100
VANCOMYCIN SERPL-MCNC: 28.9 UG/ML
VENT AC: 18
VENT AC: 18
VENT- AC: AC
VENT- AC: AC
VENTRICULAR RATE: 155 BPM
VT SETTING VENT: 450 ML
VT SETTING VENT: 450 ML
WBC # BLD AUTO: 21.84 THOUSAND/UL (ref 4.31–10.16)

## 2021-02-05 PROCEDURE — 94760 N-INVAS EAR/PLS OXIMETRY 1: CPT

## 2021-02-05 PROCEDURE — 82948 REAGENT STRIP/BLOOD GLUCOSE: CPT

## 2021-02-05 PROCEDURE — 94150 VITAL CAPACITY TEST: CPT

## 2021-02-05 PROCEDURE — 87081 CULTURE SCREEN ONLY: CPT | Performed by: PHYSICIAN ASSISTANT

## 2021-02-05 PROCEDURE — 99291 CRITICAL CARE FIRST HOUR: CPT | Performed by: INTERNAL MEDICINE

## 2021-02-05 PROCEDURE — 82805 BLOOD GASES W/O2 SATURATION: CPT | Performed by: NURSE PRACTITIONER

## 2021-02-05 PROCEDURE — 93010 ELECTROCARDIOGRAM REPORT: CPT | Performed by: INTERNAL MEDICINE

## 2021-02-05 PROCEDURE — 71045 X-RAY EXAM CHEST 1 VIEW: CPT

## 2021-02-05 PROCEDURE — 80202 ASSAY OF VANCOMYCIN: CPT | Performed by: PSYCHIATRY & NEUROLOGY

## 2021-02-05 PROCEDURE — 85730 THROMBOPLASTIN TIME PARTIAL: CPT | Performed by: INTERNAL MEDICINE

## 2021-02-05 PROCEDURE — 85027 COMPLETE CBC AUTOMATED: CPT | Performed by: PSYCHIATRY & NEUROLOGY

## 2021-02-05 PROCEDURE — 85007 BL SMEAR W/DIFF WBC COUNT: CPT | Performed by: PSYCHIATRY & NEUROLOGY

## 2021-02-05 PROCEDURE — 80053 COMPREHEN METABOLIC PANEL: CPT | Performed by: PSYCHIATRY & NEUROLOGY

## 2021-02-05 PROCEDURE — 94003 VENT MGMT INPAT SUBQ DAY: CPT

## 2021-02-05 PROCEDURE — 94762 N-INVAS EAR/PLS OXIMTRY CONT: CPT

## 2021-02-05 PROCEDURE — 99233 SBSQ HOSP IP/OBS HIGH 50: CPT | Performed by: INTERNAL MEDICINE

## 2021-02-05 RX ORDER — FUROSEMIDE 10 MG/ML
40 INJECTION INTRAMUSCULAR; INTRAVENOUS
Status: DISCONTINUED | OUTPATIENT
Start: 2021-02-05 | End: 2021-02-08

## 2021-02-05 RX ORDER — FUROSEMIDE 10 MG/ML
20 INJECTION INTRAMUSCULAR; INTRAVENOUS ONCE
Status: COMPLETED | OUTPATIENT
Start: 2021-02-05 | End: 2021-02-05

## 2021-02-05 RX ADMIN — DEXAMETHASONE SODIUM PHOSPHATE 6 MG: 4 INJECTION, SOLUTION INTRA-ARTICULAR; INTRALESIONAL; INTRAMUSCULAR; INTRAVENOUS; SOFT TISSUE at 09:47

## 2021-02-05 RX ADMIN — FUROSEMIDE 20 MG: 10 INJECTION, SOLUTION INTRAVENOUS at 05:20

## 2021-02-05 RX ADMIN — HEPARIN SODIUM 5000 UNITS: 1000 INJECTION INTRAVENOUS; SUBCUTANEOUS at 22:06

## 2021-02-05 RX ADMIN — DEXAMETHASONE SODIUM PHOSPHATE 6 MG: 4 INJECTION, SOLUTION INTRA-ARTICULAR; INTRALESIONAL; INTRAMUSCULAR; INTRAVENOUS; SOFT TISSUE at 21:05

## 2021-02-05 RX ADMIN — CEFEPIME HYDROCHLORIDE 2000 MG: 2 INJECTION, POWDER, FOR SOLUTION INTRAVENOUS at 11:34

## 2021-02-05 RX ADMIN — NOREPINEPHRINE BITARTRATE 7 MCG/MIN: 1 INJECTION, SOLUTION, CONCENTRATE INTRAVENOUS at 07:30

## 2021-02-05 RX ADMIN — CEFEPIME HYDROCHLORIDE 2000 MG: 2 INJECTION, POWDER, FOR SOLUTION INTRAVENOUS at 23:52

## 2021-02-05 RX ADMIN — PROPOFOL 20 MCG/KG/MIN: 10 INJECTION, EMULSION INTRAVENOUS at 02:23

## 2021-02-05 RX ADMIN — PROPOFOL 20 MCG/KG/MIN: 10 INJECTION, EMULSION INTRAVENOUS at 10:09

## 2021-02-05 RX ADMIN — LEVETIRACETAM 750 MG: 100 INJECTION, SOLUTION INTRAVENOUS at 21:30

## 2021-02-05 RX ADMIN — DOCUSATE SODIUM AND SENNOSIDES 1 TABLET: 8.6; 5 TABLET ORAL at 21:05

## 2021-02-05 RX ADMIN — LEVETIRACETAM 750 MG: 100 INJECTION, SOLUTION INTRAVENOUS at 10:05

## 2021-02-05 RX ADMIN — Medication 2000 UNITS: at 09:47

## 2021-02-05 RX ADMIN — DESMOPRESSIN ACETATE 40 MG: 0.2 TABLET ORAL at 21:05

## 2021-02-05 RX ADMIN — MULTIVITAMIN 15 ML: LIQUID ORAL at 09:47

## 2021-02-05 RX ADMIN — FUROSEMIDE 40 MG: 10 INJECTION, SOLUTION INTRAVENOUS at 16:30

## 2021-02-05 RX ADMIN — PROPOFOL 20 MCG/KG/MIN: 10 INJECTION, EMULSION INTRAVENOUS at 07:31

## 2021-02-05 RX ADMIN — FAMOTIDINE 20 MG: 40 POWDER, FOR SUSPENSION ORAL at 18:12

## 2021-02-05 RX ADMIN — FAMOTIDINE 20 MG: 40 POWDER, FOR SUSPENSION ORAL at 09:47

## 2021-02-05 RX ADMIN — HEPARIN SODIUM 10 UNITS/KG/HR: 10000 INJECTION, SOLUTION INTRAVENOUS at 17:37

## 2021-02-05 RX ADMIN — CHLORHEXIDINE GLUCONATE 0.12% ORAL RINSE 15 ML: 1.2 LIQUID ORAL at 21:05

## 2021-02-05 RX ADMIN — CHLORHEXIDINE GLUCONATE 0.12% ORAL RINSE 15 ML: 1.2 LIQUID ORAL at 09:47

## 2021-02-05 RX ADMIN — PROPOFOL 20 MCG/KG/MIN: 10 INJECTION, EMULSION INTRAVENOUS at 17:36

## 2021-02-05 RX ADMIN — NOREPINEPHRINE BITARTRATE 7 MCG/MIN: 1 INJECTION, SOLUTION, CONCENTRATE INTRAVENOUS at 17:36

## 2021-02-05 RX ADMIN — PROPOFOL 20 MCG/KG/MIN: 10 INJECTION, EMULSION INTRAVENOUS at 12:32

## 2021-02-05 RX ADMIN — POLYETHYLENE GLYCOL 3350 17 G: 17 POWDER, FOR SOLUTION ORAL at 09:47

## 2021-02-05 RX ADMIN — LACTULOSE 10 G: 10 SOLUTION ORAL at 09:52

## 2021-02-05 RX ADMIN — PROPOFOL 20 MCG/KG/MIN: 10 INJECTION, EMULSION INTRAVENOUS at 22:42

## 2021-02-05 RX ADMIN — FUROSEMIDE 20 MG: 10 INJECTION, SOLUTION INTRAVENOUS at 18:12

## 2021-02-05 RX ADMIN — POLYETHYLENE GLYCOL 3350 17 G: 17 POWDER, FOR SOLUTION ORAL at 21:05

## 2021-02-05 NOTE — PROGRESS NOTES
Vancomycin IV Pharmacy-to-Dose Consultation    Timmy Mon is a 63 y o  male who is currently receiving Vancomycin IV with management by the Pharmacy Consult service  Assessment/Plan:  The patient was reviewed  Renal function is stable and no signs or symptoms of nephrotoxicity and/or infusion reactions were documented in the chart  Today's random level = 28 9, therefore no dose will be given today and random level will be repeated tomorrow morning  Based on todays assessment, continue current vancomycin (day # 2) pulse dosing of 1500 mg daily as needed for random vanco < 20, with a plan for next random level to be drawn at 0600 on 2/6  We will continue to follow the patients culture results and clinical progress daily      Jennifer Elam, Pharmacist

## 2021-02-05 NOTE — QUICK NOTE
Attempted central line to left IJ  Unable to pass guidewire 3 times  Right side was inaccessible due to neck stiffness/contracture? Post line attempt CXR showed no pneumothorax

## 2021-02-05 NOTE — PROGRESS NOTES
[address] [facility] NOTE   Jean Carlos Guerra 63 y o  male MRN: [id number]  Unit/Bed#: ICU 08 Encounter: [id number]  Reason for Consult:  Acute kidney injury on CKD    ASSESSMENT and PLAN:  1  Acute kidney injury (POA):  · Etiology:  Likely ATN in the setting of COVID-19 pneumonia, +/- contrast associated nephropathy and possible Vanco related toxicity  · Renal function improving  Creatinine down to 1 68  · Good response to diuretic  Currently on Lasix 20 mg every 8 hours  · Urinalysis:  Large blood, innumerable RBCs, fine and coarse granular casts, 2+ protein  · Plan/recommendations:  ? No change in current plan of care regarding diuretic  ? On renal function, I&O  ? Avoid nephrotoxic agents, hypotension  2  Chronic kidney disease, stage III:    · Baseline creatinine 1 1-1 3 mg/dL     · He follows with a nephrology group in the [place] area  3  Blood pressure/Volume status:    · Chest x-ray 2/1:  Viral pneumonia changes related to COVID-19  · Chest x-ray 2/4: no change in left greater than right consolidation  · Good urine output, keep output greater than intake to make sure that there is not a volume component to persistent respiratory failure  · Currently in negative fluid balance  · Blood pressure acceptable, on pressor support/Levophed  4  COVID-19:  Severe pathway     · Decompensated 02/01/2021 requiring transfer to ICU/intubation/mechanical ventilation  · Chest x-ray 2/4:   no change in left greater than right consolidation  · Ventilator dependency:  pH 7 38, pCO2 53 4, PO2 79 2, bicarbonate 30 9  Serum bicarbonate 32  · Management per primary team  · Palliative care following  5  Anemia:  Hemoglobin stable  6  Mild hypokalemia:  Replete  7  Encephalopathy:  CT of the head showing stable old infarcts and chronic microangiopathic changes no acute changes  EEG:  Moderate diffuse cerebral dysfunction  Neuro following for management        DISPOSITION:  Continue diuretic, no changes    SUBJECTIVE / 24H INTERVAL HISTORY:  Intubated, sedated with propofol    OBJECTIVE:  Current Weight: Weight - Scale: (!) 166 kg (365 lb 4 8 oz)  Vitals:    02/05/21 0500 02/05/21 0535 02/05/21 0600 02/05/21 0700   BP:       BP Location:       Pulse: 91  93 85   Resp: (!) 34  (!) 25 (!) 23   Temp: 99 °F (37 2 °C)  98 8 °F (37 1 °C) 98 6 °F (37 °C)   TempSrc:       SpO2: 97%  98% 98%   Weight:  (!) 166 kg (365 lb 4 8 oz)     Height:           Intake/Output Summary (Last 24 hours) at 2/5/2021 3614  Last data filed at 2/5/2021 0600  Gross per 24 hour   Intake 2681 69 ml   Output 3195 ml   Net -513 31 ml   Seen through reviewing window  General:  Critically ill they will  Skin: no rash  Eyes:  Eyes closed  ENT:  Oral endotracheal tube  Chest:  Equal chest expansion observed  Mechanically ventilated  CVS:  Rhythm irregular with ectopy  Abdomen:  No significant distension observed    Extremities: + edema LE b/l  :  bo  Neuro:  No spontaneous movement, sedated    Medications:    Current Facility-Administered Medications:     acetaminophen (TYLENOL) oral suspension 650 mg, 650 mg, Per NG Tube, Q4H PRN, Lan Buchanan PA-C    atorvastatin (LIPITOR) tablet 40 mg, 40 mg, Per NG Tube, HS, Lan Buchanan PA-C, 40 mg at 02/04/21 2216    bisacodyl (DULCOLAX) rectal suppository 10 mg, 10 mg, Rectal, Daily PRN, Lan Buchanan PA-C    cefepime (MAXIPIME) 2,000 mg in dextrose 5 % 50 mL IVPB, 2,000 mg, Intravenous, Q12H, Libby Kinsey PA-C, Last Rate: 100 mL/hr at 02/04/21 2247, 2,000 mg at 02/04/21 2247    chlorhexidine (PERIDEX) 0 12 % oral rinse 15 mL, 15 mL, Swish & Spit, Q12H [address], Lan Buchanan PA-C, 15 mL at 02/04/21 2215    cholecalciferol (VITAMIN D3) tablet 2,000 Units, 2,000 Units, Per NG Tube, Daily, Lan Buchanan PA-C, 2,000 Units at 02/04/21 0801    dexamethasone (DECADRON) injection 6 mg, 6 mg, Intravenous, Q12H [address], Libby Kinsey PA-C, 6 mg at 02/04/21 2216    famotidine (PEPCID) oral suspension 20 mg, 20 mg, Per NG Tube, BID, More Hogan PA-C, 20 mg at 02/04/21 1746    fentanyl citrate (PF) 100 MCG/2ML 50 mcg, 50 mcg, Intravenous, Q2H PRN, KATHY Little, 50 mcg at 02/04/21 0122    furosemide (LASIX) injection 40 mg, 40 mg, Intravenous, BID (diuretic), Sunil Graham MD    heparin (porcine) 25,000 units in 0 45% NaCl 250 mL infusion (premix), 3-30 Units/kg/hr (Order-Specific), Intravenous, Titrated, Rosalva Stevenson DO, Last Rate: 12 5 mL/hr at 02/04/21 1746, 10 Units/kg/hr at 02/04/21 1746    heparin (porcine) injection 10,000 Units, 10,000 Units, Intravenous, Q1H PRN, Alcon [place], DO    heparin (porcine) injection 5,000 Units, 5,000 Units, Intravenous, Q1H PRN, Alcon Gordo, DO    lactulose oral solution 10 g, 10 g, Oral, Daily, Sunil Graham MD, 10 g at 02/04/21 1224    levETIRAcetam (KEPPRA) 750 mg in sodium chloride 0 9 % 100 mL IVPB, 750 mg, Intravenous, [facility]12H [facility], Last Rate: 400 mL/hr at 02/04/21 2216, 750 mg at 02/04/21 2216    [COMPLETED] zinc sulfate (ZINCATE) capsule 220 mg, 220 mg, Per NG Tube, Daily, 220 mg at 01/24/21 0824 **FOLLOWED BY** multivitamin with iron-minerals liquid 15 mL, 15 mL, Per NG Tube, Daily, More Hogan PA-C, 15 mL at 02/04/21 0801    NOREPINEPHRINE 4 MG  ML NSS (CMPD ORDER) infusion, 1-30 mcg/min, Intravenous, Titrated, More Hogan PA-C, Last Rate: 26 3 mL/hr at 02/05/21 0730, 7 mcg/min at 02/05/21 0730    polyethylene glycol (MIRALAX) packet 17 g, 17 g, Oral, BID, More Hogan PA-C, 17 g at 02/04/21 2216    propofol (DIPRIVAN) 1000 mg in 100 mL infusion (premix), 5-50 mcg/kg/min, Intravenous, Titrated, More Hogan PA-C, Last Rate: 20 2 mL/hr at 02/05/21 0731, 20 mcg/kg/min at 02/05/21 0731    senna-docusate sodium (SENOKOT S) 8 6-50 mg per tablet 1 tablet, 1 tablet, Per NG Tube, HS, More Hogan PA-C, 1 tablet at 02/04/21 2216    vancomycin (VANCOCIN) 1,500 mg in sodium chloride 0 9 % 250 mL IVPB, 12 5 mg/kg (Adjusted), Intravenous, Daily PRN, Guzman Montes MD    Laboratory Results:  Results from last 7 days   Lab Units 02/05/21  0525 02/04/21  0459 02/03/21  0504 02/03/21  0457 02/02/21  1250 02/02/21  0955 02/02/21  0442 02/01/21  2059 02/01/21  1058 02/01/21  0541 01/31/21  0525 01/30/21  0629   WBC Thousand/uL 21 84* 24 84* 15 39*  --  13 36*  --  10 68*  --  12 07*  --   --  6 58   HEMOGLOBIN g/dL 10 3* 11 8* 12 0  --  11 7*  --  10 6*  --  11 9*  --   --  11 1*   I STAT HEMOGLOBIN g/dl  --   --   --   --   --  11 2*  --  13 3  --   --   --   --    HEMATOCRIT % 34 7* 38 9 41 6  --  40 7  --  36 3*  --  41 3  --   --  39 3   HEMATOCRIT, ISTAT %  --   --   --   --   --  33*  --  39  --   --   --   --    PLATELETS Thousands/uL 196 232 224  --  222  --  204  --  222  --   --  195   POTASSIUM mmol/L 3 5 3 7  --  4 0  --   --  3 4*  --  4 3 4 2 4 1 4 2   CHLORIDE mmol/L 106 107  --  104  --   --  105  --  105 105 105 104   CO2 mmol/L 32 30  --  35*  --   --  32  --  36* 34* 32 31   CO2, I-STAT mmol/L  --   --   --   --   --  37*  --  36*  --   --   --   --    BUN mg/dL 54* 59*  --  60*  --   --  64*  --  68* 64* 58* 57*   CREATININE mg/dL 1 68* 1 86*  --  1 86*  --   --  1 81*  --  2 05* 1 94* 2 10* 2 12*   CALCIUM mg/dL 7 7* 8 1*  --  8 4  --   --  8 1*  --  8 5 8 3 7 9* 8 0*   MAGNESIUM mg/dL  --  1 6  --  1 6  --   --   --   --   --   --   --   --    PHOSPHORUS mg/dL  --  3 1  --  2 7  --   --   --   --   --   --   --   --    GLUCOSE, ISTAT mg/dl  --   --   --   --   --  105  --  148*  --   --   --   --    1

## 2021-02-05 NOTE — PROGRESS NOTES
Daily Progress Note - Critical Care   Lizte Friedman 63 y o  male MRN: [id number]  Unit/Bed#: ICU 08 Encounter: [id number]        ----------------------------------------------------------------------------------------  HPI/24hr events: Chest xray shows maybe possible pulmonary edema  On AC/VC 18/450/100/14 overnight     ---------------------------------------------------------------------------------------  SUBJECTIVE  Intubated  Review of Systems  Review of systems was reviewed and negative unless stated above in HPI/24-hour events   ---------------------------------------------------------------------------------------  Assessment and Plan:    Neuro:   · Diagnosis: Seizure due to multiple contributiuons Stroke Recrudescence, Infection, Encephalopathy (Not status epilepticus)  ? Plan: Consulted Neuro- appreciate recs  ? Keppra down to 750mg q12hrs  ? Ativan PRN for seizure like activity  ? Continue propofol- wean as necessary  ? EEG 2/3- Routine EEG are too slow suggesting moderate diffuse cerebral dysfunction- No spike waves mentioned  ? RASS goal -1 to -2  ? Appreciate Neurology input  ? Continue talking with Palliative Care  ? Propofol 20  · Depression  ? Continue home Zoloft        CV:   · Diagnosis: Afib  ? Plan: Currently in sinus rhythm  ? Not on AC at home secondary to GI bleed history  ? Continue telemetry  ? Cardizem held due to hypotension  ? Restart lopressor if bp tolerated  · Diagnosis: Hx of htn  ? Plan: Home bumex- replaced with lasix   ? Dose of lasix 2/1 for presumed volume overload  ? Low dose vasopressors 2/1 for hypotension following intubation and sedation  ? Increased levophed at 8mcg/min- Wean if possible with goal >41 MaP or systolic >042W  · Diagnosis: Hx of HLD  ? Continue Atorvastatin  · Volume Overload  ?  Home Bumex discontinued and placed on Lasix 40mg BID; further decreased to Lasix 20mg BID then to TID to meet goal -1L  § Did not meet goal net negative -340mL  § Increase back to Lasix 40mg BID  § Net negative goal to -1L        Pulm:  · Diagnosis: Acute respiratory failure with hypoxia and hypercarbia most likely due to COVID  ? Plan: Reintubated 2/1 for airway protection (following seizure activity  ? Current settings AC/VC 18/450/10/90  ? Patient was transitioned to APRV mode without reverse I:E ratio  ABG 2/2 had P/F ratio of 60 despute PEEP 10  Change to AC/VC   He was on bipap prior to intubation for worsening hypoxia and increased WOB  ? Titrate FiO2 for SPO2>90%  ? COVID therapy for severe pathway protocol  ? ABG 2/4- pH 7 412/pCO2 47/ pO2 49 1/ HCO3 29 3  ? ABG 2/5- pH 7 38/pCO2 53 4/pO2 79 2/HCO3 30 9        GI:   · Diagnosis: Dysphagia  ? Plan: On tube feeds Jevity 1 2 45 mL goal rate  ? On lactulose, miralax, senokot for bowel regimen- had small bowel movement 2/5  ? Previously recommended pureed diet by SLP        :   · Diagnosis: PATRICK on CKD  ? Plan: Baseline creatinine is 1 1-1 3mg/dL  ? Creatinine decreased to 1 68 today  ? Home bumex replaced with lasix  § Increase back to 40mg BID lasix- monitor creatinine        F/E/N:   · Plan: Fluids:   ? Propofol 20  ? Levophed 8  ? Heparin 10  · Plan for central line insertion  · Electrolyte: Replete Mg>2, K>4, Phos>3  · Nutrition: Tube Feeds        Heme/Onc:   · Diagnosis: Positive D-dimer (COVID patient  ? Plan: D-dimer decreaseing  ? LE duplex due to concern for PE/DVT- no DVT found  ? On heparin gtt        Endo:   · Diagnosis: No acute issues        ID:   · Diagnosis: Pneumonia due to COVID-19  ? Plan: Positive on 1/17- Also convalescent plasma  ? Not candidate for remdesivir  ? CRP 10 6->11 5->42 1  ? NT-BNP 1994->2293->784  ? D-dimer 19 4->7 15->3 41  § Next inflammatory markers 2/6  ? Ferritin 242 normal   ? Continue decadron 6mg q12hrs  ? VC/Zinc completed  On MV  ? Continue statin  ? Continue pepcid  ? Heparin Drip  ? Broad spectrum abx started 2/2 for concern for superimposed bacterial pneumonia   PCT normal  § Procal positive and thus cefepime and vanco restarted - discontinue if next set of procal negative        MSK/Skin:   · Diagnosis: Prone when possible      Disposition: Continue Critical Care   Code Status: Level 1 - Full Code  ---------------------------------------------------------------------------------------  ICU CORE MEASURES    Prophylaxis   VTE Pharmacologic Prophylaxis: Heparin Drip  VTE Mechanical Prophylaxis: sequential compression device  Stress Ulcer Prophylaxis: Famotidine IV     ABCDE Protocol (if indicated)  Plan to perform spontaneous awakening trial today? No  Plan to perform spontaneous breathing trial today? No  Obvious barriers to extubation? No  CAM-ICU: Negative    Invasive Devices Review  Invasive Devices     Peripheral Intravenous Line            Long-Dwell Peripheral IV (Midline) 68 Left Cephalic 10 days    Peripheral IV 21 Right Forearm 3 days          Arterial Line            Arterial Line 21 Radial 1 day          Drain            Urethral Catheter Straight-tip 16 Fr  18 days    NG/OG/Enteral Tube Orogastric 3 days          Airway            ETT  Cuffed; Hi-Lo 8 mm 3 days              Can any invasive devices be discontinued today?  No  ---------------------------------------------------------------------------------------  OBJECTIVE    Vitals   Vitals:    21 0200 21 0300 21 0400 21 0535   BP:       BP Location:       Pulse: 92 105 96    Resp: (!) 45 (!) 94 (!) 49    Temp: 99 °F (37 2 °C) 99 °F (37 2 °C) 99 °F (37 2 °C)    TempSrc:       SpO2: 95% 93% 95%    Weight:    (!) 166 kg (365 lb 4 8 oz)   Height:         Temp (24hrs), Av 9 °F (37 2 °C), Min:98 6 °F (37 °C), Max:99 °F (37 2 °C)  Current: Temperature: 99 °F (37 2 °C)      Respiratory:  SpO2: SpO2: 98 %, SpO2 Activity: SpO2 Activity: At Rest, SpO2 Device: O2 Device: Ventilator, Capnography:    Nasal Cannula O2 Flow Rate (L/min): 50 L/min    Invasive/non-invasive ventilation settings Respiratory    Lab Data (Last 4 hours)      02/05 0525            pH, Arterial       7 380           pCO2, Arterial       53 4           pO2, Arterial       79 2           HCO3, Arterial       30 9           Base Excess, Arterial       4 7                O2/Vent Data     None                Physical Exam  Vitals signs reviewed  Constitutional:       Appearance: He is well-developed  He is obese  He is ill-appearing  HENT:      Head: Normocephalic and atraumatic  Eyes:      Conjunctiva/sclera: Conjunctivae normal    Neck:      Musculoskeletal: Neck supple  Cardiovascular:      Rate and Rhythm: Normal rate and regular rhythm  Heart sounds: No murmur  Pulmonary:      Effort: Pulmonary effort is normal  No respiratory distress  Breath sounds: Examination of the right-lower field reveals rales  Examination of the left-lower field reveals rales  Rales present  Abdominal:      Palpations: Abdomen is soft  Tenderness: There is no abdominal tenderness  Musculoskeletal:      Right lower leg: Edema present  Left lower leg: Edema present  Skin:     General: Skin is warm and dry  Neurological:      Mental Status: He is alert           Laboratory and Diagnostics:  Results from last 7 days   Lab Units 02/05/21  0525 02/04/21  0459 02/03/21  0504 02/02/21  1250 02/02/21  0955 02/02/21  0442 02/01/21  2059 02/01/21  1058 01/30/21  0629 01/29/21  0552   WBC Thousand/uL 21 84* 24 84* 15 39* 13 36*  --  10 68*  --  12 07* 6 58 4 68   HEMOGLOBIN g/dL 10 3* 11 8* 12 0 11 7*  --  10 6*  --  11 9* 11 1* 9 9*   I STAT HEMOGLOBIN g/dl  --   --   --   --  11 2*  --  13 3  --   --   --    HEMATOCRIT % 34 7* 38 9 41 6 40 7  --  36 3*  --  41 3 39 3 34 0*   HEMATOCRIT, ISTAT %  --   --   --   --  33*  --  39  --   --   --    PLATELETS Thousands/uL 196 232 224 222  --  204  --  222 195 173   NEUTROS PCT %  --  91*  --   --   --  91*  --   --  91* 82*   BANDS PCT %  --   --  2  --   --   --   --   --   -- --    MONOS PCT %  --  3*  --   --   --  4  --   --  3* 6   MONO PCT %  --   --  3*  --   --   --   --  2*  --   --      Results from last 7 days   Lab Units 02/04/21 0459 02/03/21 0457 02/02/21 0955 02/02/21 0442 02/01/21 2059 02/01/21  1058 02/01/21  0541 01/31/21  0525 01/30/21  0629 01/29/21  0552   SODIUM mmol/L 144 144  --  142  --  145 145 143 141 139   POTASSIUM mmol/L 3 7 4 0  --  3 4*  --  4 3 4 2 4 1 4 2 3 3*   CHLORIDE mmol/L 107 104  --  105  --  105 105 105 104 106   CO2 mmol/L 30 35*  --  32  --  36* 34* 32 31 27   CO2, I-STAT mmol/L  --   --  37*  --  36*  --   --   --   --   --    ANION GAP mmol/L 7 5  --  5  --  4 6 6 6 6   BUN mg/dL 59* 60*  --  64*  --  68* 64* 58* 57* 53*   CREATININE mg/dL 1 86* 1 86*  --  1 81*  --  2 05* 1 94* 2 10* 2 12* 1 92*   CALCIUM mg/dL 8 1* 8 4  --  8 1*  --  8 5 8 3 7 9* 8 0* 7 0*   GLUCOSE RANDOM mg/dL 144* 101  --  135  --  104 108 124 128 141*   ALT U/L 16 20  --  22  --  23  --   --  27 21   AST U/L 13 16  --  15  --  14  --   --  21 17   ALK PHOS U/L 63 64  --  56  --  60  --   --  58 45*   ALBUMIN g/dL 2 8* 3 1*  --  2 8*  --  3 3*  --   --  2 3* 1 9*   TOTAL BILIRUBIN mg/dL 0 60 0 58  --  0 51  --  0 64  --   --  0 38 0 44     Results from last 7 days   Lab Units 02/04/21 0459 02/03/21 0457   MAGNESIUM mg/dL 1 6 1 6   PHOSPHORUS mg/dL 3 1 2 7      Results from last 7 days   Lab Units 02/04/21  0458 02/04/21  0225 02/03/21  1954 02/03/21  1223 02/03/21  0719 02/02/21  2237 02/02/21 2013 02/02/21  1250   INR   --   --   --   --   --   --   --  1 22*   PTT seconds 73* 76* 72* 99* 163* 210* >210* 31      Results from last 7 days   Lab Units 02/01/21  1058   TROPONIN I ng/mL 0 03     Results from last 7 days   Lab Units 02/02/21  0442   LACTIC ACID mmol/L 1 2     ABG:  Results from last 7 days   Lab Units 02/05/21  0525   PH ART  7 380   PCO2 ART mm Hg 53 4*   PO2 ART mm Hg 79 2   HCO3 ART mmol/L 30 9*   BASE EXC ART mmol/L 4 7   ABG SOURCE  Line, Arterial VBG:  Results from last 7 days   Lab Units 02/05/21  0525   ABG SOURCE  Line, Arterial     Results from last 7 days   Lab Units 02/04/21  0459 02/03/21  0457 02/01/21  1058   PROCALCITONIN ng/ml 0 38* 0 45* 0 13       Micro  Results from last 7 days   Lab Units 02/04/21  1220 02/01/21  1208 02/01/21  1200   BLOOD CULTURE  Received in Microbiology Lab  Culture in Progress  Received in Microbiology Lab  Culture in Progress  No Growth at 48 hrs  No Growth at 48 hrs  EKG: N/A  Imaging:  I have personally reviewed pertinent reports  Intake and Output  I/O       02/03 0701 - 02/04 0700 02/04 0701 - 02/05 0700    P  O  0 0    I V  (mL/kg) 1014 7 (6) 1099 4 (6 6)    NG/ 120    IV Piggyback 200 400    Feedings 625 1100    Total Intake(mL/kg) 1959 7 (11 7) 2719 4 (16 4)    Urine (mL/kg/hr) 2300 (0 6) 3245 (0 8)    Total Output 2300 3245    Net -340 3 -525 7              UOP: 3345 ml/hr   Net: 405 mL/hr  Since Admit: -13,746    Height and Weights   Height: 5' 11" (180 3 cm)  IBW: 75 3 kg  Body mass index is 50 95 kg/m²  Weight (last 2 days)     Date/Time   Weight    02/05/21 0535   (!) 166 (365 3)                Nutrition       Diet Orders   (From admission, onward)             Start     Ordered    02/03/21 0344  Diet Enteral/Parenteral; Tube Feeding No Oral Diet; Jevity 1 2 Denny; Continuous; 55  Diet effective now     Question Answer Comment   Diet Type Enteral/Parenteral    Enteral/Parenteral Tube Feeding No Oral Diet    Tube Feeding Formula: Jevity 1 2 Denny    Bolus/Cyclic/Continuous Continuous    Tube Feeding Goal Rate (mL/hr): 55    RD to adjust diet per protocol? Yes        02/03/21 0351    01/17/21 1719  Room Service  Once     Question:  Type of Service  Answer:  Room Service- Not Appropriate    01/17/21 1718              TF currently running at 55 ml/hr with a goal of 55 ml/hr   Formula: Jevity 1 2      Active Medications  Scheduled Meds:  Current Facility-Administered Medications   Medication Dose Route Frequency Provider Last Rate    acetaminophen  650 mg Per NG Tube Q4H PRN Dontrelly Gobble, PA-C      atorvastatin  40 mg Per NG Tube HS Ellen Godeysile, PA-C      bisacodyl  10 mg Rectal Daily PRN Mercy Gobble, PA-C      cefepime  2,000 mg Intravenous Q12H Jettie Mandril, PA-C 2,000 mg (02/04/21 2247)    chlorhexidine  15 mL [place], [place]      cholecalciferol  2,000 Units Per NG Tube Daily Dontrelly Gobble, PA-C      dexamethasone  6 mg Intravenous Q12H [address] [place], [place]      famotidine  20 mg Per NG Tube BID Ellen Mezale, PA-C      fentanyl citrate (PF)  50 mcg Intravenous Q2H PRN KATHY Costello      furosemide  20 mg Intravenous TID (diuretic) Scott Madrid MD      heparin (porcine)  3-30 Units/kg/hr (Order-Specific) Intravenous Titrated Keo Flatness, DO 10 Units/kg/hr (02/04/21 1746)    heparin (porcine)  10,000 Units Intravenous Q1H PRN Keo Flatness, DO      heparin (porcine)  5,000 Units Intravenous Q1H PRN Keo Flatness, DO      lactulose  10 g Oral Daily Scott Madrid MD      levETIRAcetam  750 mg Intravenous Q12H [address] CECILE Wilcox-C 750 mg (02/04/21 2216)    multivitamin with iron-minerals  15 mL Per NG Tube Daily Ellen Godeysile, PA-C      norepinephrine  1-30 mcg/min Intravenous Titrated Mercy Gobble, PA-C 6 mcg/min (02/04/21 2021)    polyethylene glycol  17 g Oral BID Ellen Gobble, PA-C      propofol  5-50 mcg/kg/min Intravenous Titrated Mercy Gobble, PA-C 20 mcg/kg/min (02/05/21 0223)    senna-docusate sodium  1 tablet Per NG Tube HS Mercy Gobble, PA-C      vancomycin  12 5 mg/kg (Adjusted) Intravenous Daily PRN Scott Madrid MD       Continuous Infusions:  heparin (porcine), 3-30 Units/kg/hr (Order-Specific), Last Rate: 10 Units/kg/hr (02/04/21 1746)  norepinephrine, 1-30 mcg/min, Last Rate: 6 mcg/min (02/04/21 2021)  propofol, 5-50 mcg/kg/min, Last Rate: 20 mcg/kg/min (02/05/21 0223)      PRN Meds: acetaminophen, 650 mg, Q4H PRN  bisacodyl, 10 mg, Daily PRN  fentanyl citrate (PF), 50 mcg, Q2H PRN  heparin (porcine), 10,000 Units, Q1H PRN  heparin (porcine), 5,000 Units, Q1H PRN  vancomycin, 12 5 mg/kg (Adjusted), Daily PRN        Allergies   Allergies   Allergen Reactions    Amiodarone      Developed Rash    Penicillins Rash     However, has subsequently tolerated Cefazolin and Cefepime     ---------------------------------------------------------------------------------------  Advance Directive and Living Will:      Power of :    POLST:    ---------------------------------------------------------------------------------------  Care Time Delivered:   Upon my evaluation, this patient had a high probability of imminent or life-threatening deterioration due to Increased oxygen demand, which required my direct attention, intervention, and personal management  I have personally provided 30 minutes (7 to 7:30) of critical care time, exclusive of procedures, teaching, family meetings, and any prior time recorded by providers other than myself  Sarwat Green MD      Portions of the record may have been created with voice recognition software  Occasional wrong word or "sound a like" substitutions may have occurred due to the inherent limitations of voice recognition software    Read the chart carefully and recognize, using context, where substitutions have occurred

## 2021-02-05 NOTE — QUICK NOTE
Call placed to patients wife Kathryna Drivers  Provided updates on clinical status  All questions answered

## 2021-02-05 NOTE — NUTRITION
Noted propofol rate, pt receiving additional 534 kcal daily  At this time, recommend goal TF rate of Jevity 1 5 @ 45 ml/hr with additional 2 packets of Prosource Liquid Protein daily  Including calories from propofol, will provide 2274 kcal, 99g protein, 821 ml free water  Additional free water per primary team given volume overload and diuresis  Will continue to monitor for changes to propofol rate that would allow changes to TF rate

## 2021-02-06 LAB
ANION GAP SERPL CALCULATED.3IONS-SCNC: 6 MMOL/L (ref 4–13)
APTT PPP: 114 SECONDS (ref 23–37)
APTT PPP: 60 SECONDS (ref 23–37)
APTT PPP: 66 SECONDS (ref 23–37)
BASE EXCESS BLDA CALC-SCNC: 1.8 MMOL/L
BASOPHILS # BLD AUTO: 0.02 THOUSANDS/ΜL (ref 0–0.1)
BASOPHILS NFR BLD AUTO: 0 % (ref 0–1)
BUN SERPL-MCNC: 52 MG/DL (ref 5–25)
CALCIUM SERPL-MCNC: 7.9 MG/DL (ref 8.3–10.1)
CHLORIDE SERPL-SCNC: 105 MMOL/L (ref 100–108)
CO2 SERPL-SCNC: 32 MMOL/L (ref 21–32)
CREAT SERPL-MCNC: 1.5 MG/DL (ref 0.6–1.3)
CRP SERPL QL: 66.7 MG/L
D DIMER PPP FEU-MCNC: 1.47 UG/ML FEU
EOSINOPHIL # BLD AUTO: 0.09 THOUSAND/ΜL (ref 0–0.61)
EOSINOPHIL NFR BLD AUTO: 1 % (ref 0–6)
ERYTHROCYTE [DISTWIDTH] IN BLOOD BY AUTOMATED COUNT: 20.2 % (ref 11.6–15.1)
FERRITIN SERPL-MCNC: 170 NG/ML (ref 8–388)
GFR SERPL CREATININE-BSD FRML MDRD: 50 ML/MIN/1.73SQ M
GLUCOSE SERPL-MCNC: 126 MG/DL (ref 65–140)
GLUCOSE SERPL-MCNC: 132 MG/DL (ref 65–140)
GLUCOSE SERPL-MCNC: 144 MG/DL (ref 65–140)
GLUCOSE SERPL-MCNC: 155 MG/DL (ref 65–140)
GLUCOSE SERPL-MCNC: 176 MG/DL (ref 65–140)
HCO3 BLDA-SCNC: 28.3 MMOL/L (ref 22–28)
HCT VFR BLD AUTO: 32.8 % (ref 36.5–49.3)
HGB BLD-MCNC: 9.7 G/DL (ref 12–17)
HOROWITZ INDEX BLDA+IHG-RTO: 70 MM[HG]
IMM GRANULOCYTES # BLD AUTO: 0.33 THOUSAND/UL (ref 0–0.2)
IMM GRANULOCYTES NFR BLD AUTO: 2 % (ref 0–2)
LYMPHOCYTES # BLD AUTO: 0.22 THOUSANDS/ΜL (ref 0.6–4.47)
LYMPHOCYTES NFR BLD AUTO: 2 % (ref 14–44)
MAGNESIUM SERPL-MCNC: 1.9 MG/DL (ref 1.6–2.6)
MCH RBC QN AUTO: 23.5 PG (ref 26.8–34.3)
MCHC RBC AUTO-ENTMCNC: 29.6 G/DL (ref 31.4–37.4)
MCV RBC AUTO: 80 FL (ref 82–98)
MONOCYTES # BLD AUTO: 0.37 THOUSAND/ΜL (ref 0.17–1.22)
MONOCYTES NFR BLD AUTO: 3 % (ref 4–12)
NEUTROPHILS # BLD AUTO: 14.05 THOUSANDS/ΜL (ref 1.85–7.62)
NEUTS SEG NFR BLD AUTO: 92 % (ref 43–75)
NRBC BLD AUTO-RTO: 0 /100 WBCS
NT-PROBNP SERPL-MCNC: 1470 PG/ML
O2 CT BLDA-SCNC: 13.7 ML/DL (ref 16–23)
OXYHGB MFR BLDA: 92.5 % (ref 94–97)
PCO2 BLDA: 53.2 MM HG (ref 36–44)
PEEP RESPIRATORY: 14 CM[H2O]
PH BLDA: 7.34 [PH] (ref 7.35–7.45)
PHOSPHATE SERPL-MCNC: 4.5 MG/DL (ref 2.3–4.1)
PLATELET # BLD AUTO: 181 THOUSANDS/UL (ref 149–390)
PMV BLD AUTO: 12.4 FL (ref 8.9–12.7)
PO2 BLDA: 76.3 MM HG (ref 75–129)
POTASSIUM SERPL-SCNC: 3.5 MMOL/L (ref 3.5–5.3)
PROCALCITONIN SERPL-MCNC: 0.96 NG/ML
RBC # BLD AUTO: 4.12 MILLION/UL (ref 3.88–5.62)
SODIUM SERPL-SCNC: 143 MMOL/L (ref 136–145)
SPECIMEN SOURCE: ABNORMAL
VANCOMYCIN SERPL-MCNC: 21.5 UG/ML
VENT AC: 18
VENT- AC: AC
VT SETTING VENT: 450 ML
WBC # BLD AUTO: 15.08 THOUSAND/UL (ref 4.31–10.16)

## 2021-02-06 PROCEDURE — 80202 ASSAY OF VANCOMYCIN: CPT | Performed by: PHYSICIAN ASSISTANT

## 2021-02-06 PROCEDURE — 94003 VENT MGMT INPAT SUBQ DAY: CPT

## 2021-02-06 PROCEDURE — 99291 CRITICAL CARE FIRST HOUR: CPT | Performed by: NURSE PRACTITIONER

## 2021-02-06 PROCEDURE — 82728 ASSAY OF FERRITIN: CPT | Performed by: PHYSICIAN ASSISTANT

## 2021-02-06 PROCEDURE — 94760 N-INVAS EAR/PLS OXIMETRY 1: CPT

## 2021-02-06 PROCEDURE — 85730 THROMBOPLASTIN TIME PARTIAL: CPT | Performed by: INTERNAL MEDICINE

## 2021-02-06 PROCEDURE — 99232 SBSQ HOSP IP/OBS MODERATE 35: CPT | Performed by: INTERNAL MEDICINE

## 2021-02-06 PROCEDURE — 99292 CRITICAL CARE ADDL 30 MIN: CPT | Performed by: INTERNAL MEDICINE

## 2021-02-06 PROCEDURE — 83735 ASSAY OF MAGNESIUM: CPT | Performed by: PHYSICIAN ASSISTANT

## 2021-02-06 PROCEDURE — 82805 BLOOD GASES W/O2 SATURATION: CPT | Performed by: PSYCHIATRY & NEUROLOGY

## 2021-02-06 PROCEDURE — 85730 THROMBOPLASTIN TIME PARTIAL: CPT | Performed by: PHYSICIAN ASSISTANT

## 2021-02-06 PROCEDURE — 94150 VITAL CAPACITY TEST: CPT

## 2021-02-06 PROCEDURE — 80048 BASIC METABOLIC PNL TOTAL CA: CPT | Performed by: PHYSICIAN ASSISTANT

## 2021-02-06 PROCEDURE — 84145 PROCALCITONIN (PCT): CPT | Performed by: PHYSICIAN ASSISTANT

## 2021-02-06 PROCEDURE — 85027 COMPLETE CBC AUTOMATED: CPT | Performed by: PHYSICIAN ASSISTANT

## 2021-02-06 PROCEDURE — 84100 ASSAY OF PHOSPHORUS: CPT | Performed by: PHYSICIAN ASSISTANT

## 2021-02-06 PROCEDURE — 82948 REAGENT STRIP/BLOOD GLUCOSE: CPT

## 2021-02-06 PROCEDURE — 94762 N-INVAS EAR/PLS OXIMTRY CONT: CPT

## 2021-02-06 PROCEDURE — 83880 ASSAY OF NATRIURETIC PEPTIDE: CPT | Performed by: PHYSICIAN ASSISTANT

## 2021-02-06 PROCEDURE — 85379 FIBRIN DEGRADATION QUANT: CPT | Performed by: PHYSICIAN ASSISTANT

## 2021-02-06 PROCEDURE — 86140 C-REACTIVE PROTEIN: CPT | Performed by: PHYSICIAN ASSISTANT

## 2021-02-06 RX ORDER — FENTANYL CITRATE 50 UG/ML
50 INJECTION, SOLUTION INTRAMUSCULAR; INTRAVENOUS
Status: DISCONTINUED | OUTPATIENT
Start: 2021-02-06 | End: 2021-02-16

## 2021-02-06 RX ORDER — LEVETIRACETAM 100 MG/ML
750 SOLUTION ORAL EVERY 12 HOURS SCHEDULED
Status: DISCONTINUED | OUTPATIENT
Start: 2021-02-06 | End: 2021-02-16

## 2021-02-06 RX ORDER — DEXAMETHASONE SODIUM PHOSPHATE 4 MG/ML
4 INJECTION, SOLUTION INTRA-ARTICULAR; INTRALESIONAL; INTRAMUSCULAR; INTRAVENOUS; SOFT TISSUE EVERY 12 HOURS SCHEDULED
Status: DISCONTINUED | OUTPATIENT
Start: 2021-02-06 | End: 2021-02-08

## 2021-02-06 RX ORDER — POTASSIUM CHLORIDE 20MEQ/15ML
40 LIQUID (ML) ORAL ONCE
Status: COMPLETED | OUTPATIENT
Start: 2021-02-06 | End: 2021-02-06

## 2021-02-06 RX ORDER — LANOLIN ALCOHOL/MO/W.PET/CERES
6 CREAM (GRAM) TOPICAL
Status: DISCONTINUED | OUTPATIENT
Start: 2021-02-06 | End: 2021-03-04 | Stop reason: HOSPADM

## 2021-02-06 RX ORDER — MAGNESIUM SULFATE HEPTAHYDRATE 40 MG/ML
2 INJECTION, SOLUTION INTRAVENOUS ONCE
Status: COMPLETED | OUTPATIENT
Start: 2021-02-06 | End: 2021-02-06

## 2021-02-06 RX ORDER — ASPIRIN 81 MG/1
81 TABLET, CHEWABLE ORAL DAILY
Status: DISCONTINUED | OUTPATIENT
Start: 2021-02-07 | End: 2021-03-04 | Stop reason: HOSPADM

## 2021-02-06 RX ORDER — AMOXICILLIN 250 MG
1 CAPSULE ORAL 2 TIMES DAILY
Status: DISCONTINUED | OUTPATIENT
Start: 2021-02-06 | End: 2021-02-17

## 2021-02-06 RX ADMIN — MULTIVITAMIN 15 ML: LIQUID ORAL at 09:10

## 2021-02-06 RX ADMIN — FAMOTIDINE 20 MG: 40 POWDER, FOR SUSPENSION ORAL at 09:11

## 2021-02-06 RX ADMIN — POLYETHYLENE GLYCOL 3350 17 G: 17 POWDER, FOR SOLUTION ORAL at 09:10

## 2021-02-06 RX ADMIN — DEXAMETHASONE SODIUM PHOSPHATE 4 MG: 4 INJECTION INTRA-ARTICULAR; INTRALESIONAL; INTRAMUSCULAR; INTRAVENOUS; SOFT TISSUE at 21:31

## 2021-02-06 RX ADMIN — CHLORHEXIDINE GLUCONATE 0.12% ORAL RINSE 15 ML: 1.2 LIQUID ORAL at 09:11

## 2021-02-06 RX ADMIN — FENTANYL CITRATE 50 MCG: 50 INJECTION INTRAMUSCULAR; INTRAVENOUS at 03:00

## 2021-02-06 RX ADMIN — MAGNESIUM SULFATE HEPTAHYDRATE 2 G: 40 INJECTION, SOLUTION INTRAVENOUS at 16:11

## 2021-02-06 RX ADMIN — PROPOFOL 30 MCG/KG/MIN: 10 INJECTION, EMULSION INTRAVENOUS at 21:50

## 2021-02-06 RX ADMIN — FUROSEMIDE 40 MG: 10 INJECTION, SOLUTION INTRAVENOUS at 09:11

## 2021-02-06 RX ADMIN — Medication 2 MCG/MIN: at 15:38

## 2021-02-06 RX ADMIN — POTASSIUM CHLORIDE 40 MEQ: 20 SOLUTION ORAL at 17:59

## 2021-02-06 RX ADMIN — HEPARIN SODIUM 10 UNITS/KG/HR: 10000 INJECTION, SOLUTION INTRAVENOUS at 12:57

## 2021-02-06 RX ADMIN — PROPOFOL 20 MCG/KG/MIN: 10 INJECTION, EMULSION INTRAVENOUS at 12:57

## 2021-02-06 RX ADMIN — FENTANYL CITRATE 50 MCG: 50 INJECTION INTRAMUSCULAR; INTRAVENOUS at 21:27

## 2021-02-06 RX ADMIN — DEXAMETHASONE SODIUM PHOSPHATE 6 MG: 4 INJECTION, SOLUTION INTRA-ARTICULAR; INTRALESIONAL; INTRAMUSCULAR; INTRAVENOUS; SOFT TISSUE at 09:11

## 2021-02-06 RX ADMIN — FUROSEMIDE 40 MG: 10 INJECTION, SOLUTION INTRAVENOUS at 16:19

## 2021-02-06 RX ADMIN — POLYETHYLENE GLYCOL 3350 17 G: 17 POWDER, FOR SOLUTION ORAL at 21:32

## 2021-02-06 RX ADMIN — LEVETIRACETAM 750 MG: 100 SOLUTION ORAL at 21:51

## 2021-02-06 RX ADMIN — DESMOPRESSIN ACETATE 40 MG: 0.2 TABLET ORAL at 21:30

## 2021-02-06 RX ADMIN — CEFEPIME HYDROCHLORIDE 2000 MG: 2 INJECTION, POWDER, FOR SOLUTION INTRAVENOUS at 10:40

## 2021-02-06 RX ADMIN — PROPOFOL 20 MCG/KG/MIN: 10 INJECTION, EMULSION INTRAVENOUS at 08:02

## 2021-02-06 RX ADMIN — Medication 20 MG: at 16:19

## 2021-02-06 RX ADMIN — LACTULOSE 10 G: 10 SOLUTION ORAL at 09:10

## 2021-02-06 RX ADMIN — SERTRALINE HYDROCHLORIDE 50 MG: 50 TABLET ORAL at 21:31

## 2021-02-06 RX ADMIN — CHLORHEXIDINE GLUCONATE 0.12% ORAL RINSE 15 ML: 1.2 LIQUID ORAL at 21:32

## 2021-02-06 RX ADMIN — Medication 2000 UNITS: at 09:11

## 2021-02-06 RX ADMIN — PROPOFOL 20 MCG/KG/MIN: 10 INJECTION, EMULSION INTRAVENOUS at 16:15

## 2021-02-06 RX ADMIN — DOCUSATE SODIUM AND SENNOSIDES 1 TABLET: 8.6; 5 TABLET, FILM COATED ORAL at 17:59

## 2021-02-06 RX ADMIN — LEVETIRACETAM 750 MG: 100 INJECTION, SOLUTION INTRAVENOUS at 09:36

## 2021-02-06 RX ADMIN — Medication 6 MG: at 21:32

## 2021-02-06 NOTE — PROGRESS NOTES
Vancomycin IV Pharmacy-to-Dose Consultation  Seda Camejo is a 63 y o  male who is currently receiving Vancomycin IV with management by the Pharmacy Consult service for the treatment of Pneumonia    Assessment/Plan:  The patient's chart was reviewed  Renal function is stable  There are no signs or symptoms of nephrotoxicity and/or infusion reactions documented  The following nephrotoxicity factors are present:  Medications: Zosyn, IV acyclovir, tenofovir, high-dose Bactrim, Aminoglycosides, amphotericin B, colistin/polymyxin B, vasopressors, diuretics, NSAIDs, ACEIs/ARBs, IV contrast, cyclosporine, tacrolimus, cisplatin, lithium, etc     Patient-Factors: elderly, dehydration, hypotension, renal dysfunction, blood loss (surgery)  Based on todays assessment, will not re-dose today since random is 21 5 (<20)  Reassess tomorrow's random level and re-dose 1500mg when level <20  Will also adjust vanco dose accordingly when renal function stabilizes  Plan for random level on 2/7 @ 0600 with morning labs  We will continue to follow the patients culture results and clinical progress daily      Caprice Hirsch, PharmD  Pharmacist

## 2021-02-06 NOTE — PROGRESS NOTES
Daily Progress Note - Critical Care   Nuha Lewis 63 y o  male MRN: [id number]  Unit/Bed#: ICU 08 Encounter: [id number]        ----------------------------------------------------------------------------------------  HPI/24hr events:   · No acute events overnight  · Remained on stable vent settings  · Remains on vasopressors  ---------------------------------------------------------------------------------------  SUBJECTIVE  Patient unable to offer verbal complaints     Review of Systems   Unable to perform ROS: Intubated     Review of systems was unable to be performed secondary to intuabation  ---------------------------------------------------------------------------------------  Assessment and Plan  Principal Problem:    Acute respiratory failure with hypoxia and hypercarbia ([facility])  Active Problems:    Acute metabolic encephalopathy    Pneumonia due to COVID-19 virus    Atrial fibrillation ([facility] [address] )    Essential hypertension    Hyperlipidemia    Morbid obesity     Dysphagia    Depression    Bacteremia    Hematuria    Acute kidney injury superimposed on CKD ([facility])    Anemia    Hypernatremia    Seizure ([facility])    History of stroke    Positive D dimer in a COVID postitive patient   Resolved Problems:    * No resolved hospital problems  *      Neuro:   · Acute toxic/metabolic encephalopathy  ? Related to continuous sedation and critical illness  ? Routine neuro checks     ? Sleep/wake cycle regulation as able   § Melatonin 6mg qHS  · Seizure disorder  ? Keppra 750mg q12h   § Change to PO   · Prior CVA  ? Would consider resuming ASA 81mg daily  · Major depressive disorder  ? Consider resuming home sertraline 75mg daily, 50mg qHS   ? Seen by behavioral health on 1/22/21  · Sedation/analgesia   ?  Continuous infusions:   § Propofol 20 mcg/kg/min  § Attempt to wean off today given vasopressor requirements   § Can start Precedex or low-dose fentanyl if needed   § Use PRN's more as needed to facilitate decreasing continuing infusions   § Goal RASS 0 to -1  ? PRN medications   § 1 dose / 24 hours       CV:    Shock  o Likely secondary to effects of propofol   o Levophed 3mcg/min   - Titrate to MAP > 65  o Hopefully BP will improve as sedation is modified   o No signs of worsening sepsis at this  o Continues to tolerate diuresis   o Home medications on hold:   - Bumex 2mg daily   - Cardizem CD 180mg q24h   - Metoprolol tartrate 50mg TID    Paroxysmal A fib with RVR  o Currently NSR, rate controlled  o Continue telemetry monitoring  o Optimize electrolytes K > 4 0, mag > 2 0  o Holding home metoprolol secondary to vasopressors  o Not on home ATC secondary to history GI bleed  o Heparin drip at this time    Chronic diastolic CHF  o 34 Echo: EF 55%, unable to determine RWMA, bioprosthetic AV  o Continue aggressive diuresis as long as renal function tolerates  o Lasix 40mg BID   o BNP 1470 (21)    Hyperlipidemia   o Atorvastatin 40mg qHS       Pulm:   Acute hypoxic respiratory failure secondary to COVID-19 (POA)   o Mechanical Ventilation Day # 6  o P:F ratio on most recent AB (PEEP 14)   o Maintain VT 6-8ml/kg IBW: 75 3 kg  o Wean FiO2 as able today   o Maintain SpO2 > 90% per protocol as able   o  Suction as needed and closely monitor secretions  Maintain HOB >30 degrees  Q4h oral care with chlorhexidine BID  o Monitor peak and plateau airway pressures   Maintain Ppeak < 45cm H2O, Pplat < 30cm H2O   o See ID for COVID-related treatment plan     GI:    Prior GI bleed   o Given steroids and anticoagulation, risk of withholding PPI outweighs possible benefit to COVID with famotidine  o Will discontinue famotidine and start omeprazole 20mg q12h    Bowel regimen:  o Lactulose 10mg daily   o Miralax daily   o Senna/colace qHS  - Increase to BID    :    PATRICK superimposed on CKD-3  o Secondary to systemic effects of COVID, RUFINA, hypotension/shock  o Nephrology following, appreciate recommendations   o Baseline creatinine: 1 1 - 1 3  o Admission creatinine: 1 34  o Creatinine peak: 2 71  o Current creatinine: 1 50  o Strict q2h I/O monitoring  o Maintain bo catheter   o Continue to follow renal function tests    F/E/N:    Fluids:   o Maintenance fluids: None  o Diuresis plan: Lasix 40mg BID  - Continue for now and monitor renal function    Electrolytes:   o Replete electrolytes with as needed to maintain K >4 0, Mag >2 0, Phos >3 0  o Replaced potassium and magnesium this AM   Nutrition:   o Jevity 1 2 45 ml/hr, 2 packets prosource daily   o Change to jevity 1 5 per nutrition recommendations     Heme/Onc:    Anemia  o Multifactorial secondary to PATRICK on CKD, phlebotomy  o Concern for ongoing drop despite negative fluid balance  o Baseline hemoglobin: 9 6 - 8 1   o Admission hemoglobin: 12 5  o Current hemoglobin: 9 7  o Transfuse for hemoglobin <7 0  o Closely monitor for signs of GI bleeding   Hypercoaguable state secondary to COVID-19  o D-Dimer: 1 47 (2/6/21)   - Down from peak of 19 40  o Continue systemic anticoagulation with heparin drip    VTE prophylaxis:  Heparin gtt, SCD's to BLE    Endo:    Steroid-induced hyperglycemia  o Last hemoglobin A1c 6 2% on 2/13/19  o Add low-dose insulin regimen  o Adjust as needed to maintain goal -180      ID:   · COVID-19 pneumonia (POA)   ? 1/17/21 COVID-19: Positive   ? Continue anti-inflammatories/COVID therapies:   ? Dexamethasone 6 mg IV q12h  ? Atorvastatin 40mg qHS   ? MVI daily   ? Vitamin D3 2000 IU daily  ? Completed therapies:   § Convalescent Plasma (1/17/21)  § Actemra (1/28/21)  § Vitamin C 1000mg BID   § Zinc 220mg daily   ? Inflammatory markers:   § Ferritin: 170 (2/6/21)   § CRP: 66 7 (2/6/21)   · Mixed gram positive bacteremia  ? Cultures from [facility] with 1/2 sets growing separate colonies of staph coag-negative   ? Repeat cultures from [facility] with enterococcus faecalis and staph coag negative   ? Likely contamination   ? TTE negative for vegetation   ?  Surveillance cultures negative to date   ? Needs outpatient blood cultures on 20  ? ID signed off   · Possible superimposed bacteria pneumonia  ? Continue broad-spectrum antibiotics   ? Procalcitonin: 0 96 (21)  ? 21 BCXx2: No growth   ? 21Sputum Cx: No bacteria (prelim)   ? 21 MRSA: PENDING   ? Continue to monitor fever and WBC curve   ? CBCD daily    MSK/Skin:    PT/OT when medically appropriate    Reposition q2h, eliminate pressure points while in bed   Close skin surveillance     Disposition: Continue Critical Care   Code Status: Level 1 - Full Code  ---------------------------------------------------------------------------------------  Invasive Devices Review  Invasive Devices     Peripheral Intravenous Line            Long-Dwell Peripheral IV (Midline) 96 Left Cephalic 12 days    Peripheral IV 21 Right;Ventral (anterior) Arm less than 1 day    Peripheral IV 21 Left;Ventral (anterior) Forearm less than 1 day          Arterial Line            Arterial Line 21 Radial 2 days          Drain            Urethral Catheter Straight-tip 16 Fr  20 days    NG/OG/Enteral Tube Orogastric 4 days          Airway            ETT  Cuffed; Hi-Lo 8 mm 4 days              Can any invasive devices be discontinued today? No  ---------------------------------------------------------------------------------------  OBJECTIVE    Vitals   Vitals:    21 1145 21 1200 21 1215 21 1230   BP:       BP Location:       Pulse: 76 78 78 78   Resp: (!) 47 (!) 23 (!) 23 (!) 31   Temp: (!) 96 8 °F (36 °C) (!) 96 8 °F (36 °C) (!) 96 8 °F (36 °C) (!) 96 8 °F (36 °C)   TempSrc:       SpO2: 91% 91% 94% 94%   Weight:       Height:         Temp (24hrs), Av 6 °F (36 4 °C), Min:96 8 °F (36 °C), Max:98 6 °F (37 °C)  Current: Temperature: (!) 96 8 °F (36 °C)    Ventilator Settings:   Resp Rate (BPM): 18 BPM  VT (mL): 450  FIO2: 70%  PEEP (cmH20): 14      Physical Exam  Vitals signs reviewed  Constitutional:       Appearance: He is obese  Interventions: He is sedated, intubated and restrained  HENT:      Head: Normocephalic  Mouth/Throat:      Lips: Pink  Eyes:      General: Lids are normal       Pupils: Pupils are equal, round, and reactive to light  Neck:     Cardiovascular:      Rate and Rhythm: Normal rate and regular rhythm  Pulses:           Radial pulses are 2+ on the right side and 2+ on the left side  Dorsalis pedis pulses are 2+ on the right side and 2+ on the left side  Heart sounds: Normal heart sounds  Pulmonary:      Effort: Tachypnea present  He is intubated  Breath sounds: Examination of the right-middle field reveals decreased breath sounds  Examination of the left-middle field reveals decreased breath sounds  Examination of the right-lower field reveals decreased breath sounds  Examination of the left-lower field reveals decreased breath sounds  Decreased breath sounds present  No wheezing, rhonchi or rales  Comments: Difficult to auscultate lower lobes   Abdominal:      General: Abdomen is protuberant  Palpations: Abdomen is soft  Tenderness: There is no abdominal tenderness  Genitourinary:     Comments: Turner: clear, yellow urine   Musculoskeletal:      Right lower le+ Edema present  Left lower le+ Edema present  Skin:     General: Skin is warm  Capillary Refill: Capillary refill takes less than 2 seconds  Neurological:      Mental Status: He is lethargic  GCS: GCS eye subscore is 3  GCS verbal subscore is 1  GCS motor subscore is 6  Comments:  MAEx4           Laboratory and Diagnostics:  Results from last 7 days   Lab Units 21  0414 21  0525 21  0459 21  0504 21  1250 21  0955 21  0442  21  1058   WBC Thousand/uL 15 08* 21 84* 24 84* 15 39* 13 36*  --  10 68*  --  12 07*   HEMOGLOBIN g/dL 9 7* 10 3* 11 8* 12 0 11 7*  --  10 6*  --  11 9*   I STAT HEMOGLOBIN g/dl  --   --   --   --   --  11 2*  --    < >  --    HEMATOCRIT % 32 8* 34 7* 38 9 41 6 40 7  --  36 3*  --  41 3   HEMATOCRIT, ISTAT %  --   --   --   --   --  33*  --    < >  --    PLATELETS Thousands/uL 181 196 232 224 222  --  204  --  222   NEUTROS PCT % 92*  --  91*  --   --   --  91*  --   --    BANDS PCT %  --   --   --  2  --   --   --   --   --    MONOS PCT % 3*  --  3*  --   --   --  4  --   --    MONO PCT %  --  1*  --  3*  --   --   --   --  2*    < > = values in this interval not displayed       Results from last 7 days   Lab Units 02/06/21  0414 02/05/21  0525 02/04/21  0459 02/03/21  0457 02/02/21  0955 02/02/21  0442 02/01/21 2059 02/01/21  1058 02/01/21  0541   SODIUM mmol/L 143 143 144 144  --  142  --  145 145   POTASSIUM mmol/L 3 5 3 5 3 7 4 0  --  3 4*  --  4 3 4 2   CHLORIDE mmol/L 105 106 107 104  --  105  --  105 105   CO2 mmol/L 32 32 30 35*  --  32  --  36* 34*   CO2, I-STAT mmol/L  --   --   --   --  37*  --  36*  --   --    ANION GAP mmol/L 6 5 7 5  --  5  --  4 6   BUN mg/dL 52* 54* 59* 60*  --  64*  --  68* 64*   CREATININE mg/dL 1 50* 1 68* 1 86* 1 86*  --  1 81*  --  2 05* 1 94*   CALCIUM mg/dL 7 9* 7 7* 8 1* 8 4  --  8 1*  --  8 5 8 3   GLUCOSE RANDOM mg/dL 176* 147* 144* 101  --  135  --  104 108   ALT U/L  --  19 16 20  --  22  --  23  --    AST U/L  --  19 13 16  --  15  --  14  --    ALK PHOS U/L  --  67 63 64  --  56  --  60  --    ALBUMIN g/dL  --  2 3* 2 8* 3 1*  --  2 8*  --  3 3*  --    TOTAL BILIRUBIN mg/dL  --  0 54 0 60 0 58  --  0 51  --  0 64  --      Results from last 7 days   Lab Units 02/06/21 0414 02/04/21  0459 02/03/21  0457   MAGNESIUM mg/dL 1 9 1 6 1 6   PHOSPHORUS mg/dL 4 5* 3 1 2 7      Results from last 7 days   Lab Units 02/06/21 0414 02/05/21  2104 02/05/21  0525 02/04/21  0458 02/04/21  0225 02/03/21  1954 02/03/21  1223  02/02/21  1250   INR   --   --   --   --   --   --   --   --  1 22*   PTT seconds 114* 51* 70* 73* 76* 72* 99*   < > 31 < > = values in this interval not displayed  Results from last 7 days   Lab Units 02/01/21  1058   TROPONIN I ng/mL 0 03     Results from last 7 days   Lab Units 02/02/21  0442   LACTIC ACID mmol/L 1 2     ABG:  Results from last 7 days   Lab Units 02/06/21  0430   PH ART  7 343*   PCO2 ART mm Hg 53 2*   PO2 ART mm Hg 76 3   HCO3 ART mmol/L 28 3*   BASE EXC ART mmol/L 1 8   ABG SOURCE  Line, Arterial     VBG:  Results from last 7 days   Lab Units 02/06/21  0430   ABG SOURCE  Line, Arterial     Results from last 7 days   Lab Units 02/04/21  0459 02/03/21  0457 02/01/21  1058   PROCALCITONIN ng/ml 0 38* 0 45* 0 13       Micro  Results from last 7 days   Lab Units 02/04/21  1703 02/04/21  1220 02/01/21  1208 02/01/21  1200   BLOOD CULTURE   --  No Growth at 24 hrs  No Growth at 24 hrs  No Growth After 4 Days  No Growth After 4 Days  SPUTUM CULTURE  Culture results to follow  --   --   --    GRAM STAIN RESULT  1+ Polys  No bacteria seen  --   --   --        EKG: NSR   Imaging: No new imaging from today   I have personally reviewed pertinent reports  and I have personally reviewed pertinent films in PACS    Intake and Output  I/O       02/04 0701 - 02/05 0700 02/05 0701 - 02/06 0700 02/06 0701 - 02/07 0700    P  O  0 0     I V  (mL/kg) 1209 8 (7 3) 1465 5 (8 9) 306 7 (1 9)    NG/ 530 210    IV Piggyback 400 410 150    Feedings 1210 940 180    Total Intake(mL/kg) 2939 8 (17 7) 3345 5 (20 3) 846 7 (5 1)    Urine (mL/kg/hr) 3345 (0 8) 3148 (0 8) 1000 (1)    Emesis/NG output  0     Stool  0 0    Total Output 3345 3148 1000    Net -405 3 +197 5 -153 3           Unmeasured Stool Occurrence  2 x 1 x          Height and Weights   Height: 5' 11" (180 3 cm)  IBW: 75 3 kg  Body mass index is 50 8 kg/m²    Weight (last 2 days)     Date/Time   Weight    02/06/21 0600   (!) 165 (364 2)    02/05/21 0535   (!) 166 (365 3)                Nutrition       Diet Orders   (From admission, onward)             Start Ordered    02/05/21 1036  Diet Enteral/Parenteral; Tube Feeding No Oral Diet; Jevity 1 2 Denny; Continuous; 45; Prosource Protein Liquid - Two Packets  Diet effective now     Question Answer Comment   Diet Type Enteral/Parenteral    Enteral/Parenteral Tube Feeding No Oral Diet    Tube Feeding Formula: Jevity 1 2 Denny    Bolus/Cyclic/Continuous Continuous    Tube Feeding Goal Rate (mL/hr): 45    Prosource Protein Liquid - No Carb Prosource Protein Liquid - Two Packets    RD to adjust diet per protocol?  Yes        02/05/21 1036    01/17/21 1719  Room Service  Once     Question:  Type of Service  Answer:  Room Service- Not Appropriate    01/17/21 1718                Active Medications  Scheduled Meds:  Current Facility-Administered Medications   Medication Dose Route Frequency Provider Last Rate    acetaminophen  650 mg Per NG Tube Q4H PRN Radha Cabrales PA-C      atorvastatin  40 mg Per NG Tube HS Radha Cabrales PA-C      bisacodyl  10 mg Rectal Daily PRN Radha Cabrales PA-C      cefepime  2,000 mg Intravenous Q12H Filipe Kemp PA-C Stopped (02/06/21 1200)    chlorhexidine  [facility] [place] [place]      cholecalciferol  2,000 Units Per NG Tube Daily Radha Cabrales PA-C      dexamethasone  6 mg Intravenous Q12H [address] [place], [place]      famotidine  20 mg Per NG Tube BID Radha Cabrales PA-C      fentanyl citrate (PF)  50 mcg Intravenous Q2H PRN KATHY Gamboa      furosemide  40 mg Intravenous BID (diuretic) Param Delgado MD      heparin (porcine)  3-30 Units/kg/hr (Order-Specific) Intravenous Titrated Tom Boothe DO 10 Units/kg/hr (02/06/21 1257)    heparin (porcine)  10,000 Units Intravenous Q1H PRN oTm Boothe DO      heparin (porcine)  5,000 Units Intravenous Q1H PRN Tom Boothe DO      lactulose  10 g Oral Daily Param Delgado MD      levETIRAcetam  750 mg Intravenous Q12H [address] Brandi Maldonado PA-C Stopped (02/06/21 1034)    multivitamin with iron-minerals  15 mL Per NG Tube Daily Josph Ripa, PA-C      norepinephrine  1-30 mcg/min Intravenous Titrated Josph Ripa, PA-C 1 mcg/min (02/06/21 1305)    polyethylene glycol  17 g Oral BID Josph Ripa, PA-C      propofol  5-50 mcg/kg/min Intravenous Titrated Josph Ripa, PA-C 20 mcg/kg/min (02/06/21 1257)    senna-docusate sodium  1 tablet Per NG Tube HS Josph Ripa, PA-C      vancomycin  12 5 mg/kg (Adjusted) Intravenous Daily PRN Saskia Chavez MD       Continuous Infusions:  heparin (porcine), 3-30 Units/kg/hr (Order-Specific), Last Rate: 10 Units/kg/hr (02/06/21 1257)  norepinephrine, 1-30 mcg/min, Last Rate: 1 mcg/min (02/06/21 1305)  propofol, 5-50 mcg/kg/min, Last Rate: 20 mcg/kg/min (02/06/21 1257)      PRN Meds:   acetaminophen, 650 mg, Q4H PRN  bisacodyl, 10 mg, Daily PRN  fentanyl citrate (PF), 50 mcg, Q2H PRN  heparin (porcine), 10,000 Units, Q1H PRN  heparin (porcine), 5,000 Units, Q1H PRN  vancomycin, 12 5 mg/kg (Adjusted), Daily PRN        Allergies   Allergies   Allergen Reactions    Amiodarone      Developed Rash    Penicillins Rash     However, has subsequently tolerated Cefazolin and Cefepime     ---------------------------------------------------------------------------------------  Advance Directive and Living Will:      Power of :    POLST:    ---------------------------------------------------------------------------------------  Care Time Delivered:   Upon my evaluation, this patient had a high probability of imminent or life-threatening deterioration due to ongoing hypoxia secondary to COVID pneumomnia with shock, which required my direct attention, intervention, and personal management  I have personally provided 40 minutes (0840 to 1330) of critical care time, exclusive of procedures, teaching, family meetings, and any prior time recorded by providers other than myself  KATHY Awan        Portions of the record may have been created with voice recognition software  Occasional wrong word or "sound a like" substitutions may have occurred due to the inherent limitations of voice recognition software    Read the chart carefully and recognize, using context, where substitutions have occurred

## 2021-02-06 NOTE — PROGRESS NOTES
NEPHROLOGY PROGRESS NOTE   Lolis Almanza 63 y o  male MRN: [id number]  Unit/Bed#: ICU 08 Encounter: [id number]  Reason for Consult: PATRICK        ASSESSMENT and PLAN:    Acute kidney injury/ acute tubular necrosis  -- secondary to COVID-19, cytokine storm, contrast associated nephropathy possible and possible vancomycin toxicity though I suspect less likely  --Urinalysis:  Large blood, innumerable RBCs, fine and coarse granular casts, 2+ protein  --excellent urine output, creatinine has improved somewhat with a creatinine of 1 5 mg/dL with stable electrolytes  --Lasix IV 40 mg twice a day aiming for net negative fluid balance      Chronic kidney disease stage III  -- baseline creatinine 1 1-1 3 mg/dL, anticipate a possible higher baseline creatinine given this episode of acute kidney injury        COVID-19  -- severe pathway, management as per critical care      Hypokalemia  --  Resolved, monitor with diuresis  SUBJECTIVE / INTERVAL HISTORY:    No overnight events    OBJECTIVE:  Current Weight: Weight - Scale: (!) 165 kg (364 lb 3 2 oz)  Vitals:    02/06/21 0500 02/06/21 0600 02/06/21 0700 02/06/21 0743   BP:       BP Location:       Pulse: 79 76 78    Resp: (!) 41 (!) 42 (!) 28    Temp: (!) 97 3 °F (36 3 °C) (!) 97 2 °F (36 2 °C) (!) 97 °F (36 1 °C)    TempSrc:       SpO2: 97% 96% 97% 95%   Weight:  (!) 165 kg (364 lb 3 2 oz)     Height:           Intake/Output Summary (Last 24 hours) at 2/6/2021 0804  Last data filed at 2/6/2021 0600  Gross per 24 hour   Intake 3345 51 ml   Output 3148 ml   Net 197 51 ml       Review of Systems:    Unable to obtain review of systems    Physical Exam  Vitals signs and nursing note reviewed  Constitutional:       Appearance: He is obese  He is ill-appearing  He is not diaphoretic  Comments: Intubated and sedated   HENT:      Head: Normocephalic and atraumatic  Eyes:      General: No scleral icterus  Cardiovascular:      Rate and Rhythm: Normal rate     Pulmonary: Comments: Intubated  Abdominal:      General: There is no distension  Tenderness: There is no guarding  Musculoskeletal:      Right lower leg: Edema present  Left lower leg: Edema present  Skin:     Coloration: Skin is not jaundiced or pale  Findings: No bruising, erythema, lesion or rash     Neurological:      Comments: Sedated on the vent         Medications:    Current Facility-Administered Medications:     acetaminophen (TYLENOL) oral suspension 650 mg, 650 mg, Per NG Tube, Q4H PRN, Cass Libman, PA-C    atorvastatin (LIPITOR) tablet 40 mg, 40 mg, Per NG Tube, HS, Cass Libman, PA-C, 40 mg at 02/05/21 2105    bisacodyl (DULCOLAX) rectal suppository 10 mg, 10 mg, Rectal, Daily PRN, Cass Libman, PA-C    cefepime (MAXIPIME) 2,000 mg in dextrose 5 % 50 mL IVPB, 2,000 mg, Intravenous, Q12H, HERBERT Evans, Stopped at 02/06/21 0022    chlorhexidine (PERIDEX) 0 12 % oral rinse 15 mL, 15 mL, Swish & Spit, Q12H [facility], Cass Libman, PA-C, 15 mL at 02/05/21 2105    cholecalciferol (VITAMIN D3) tablet 2,000 Units, 2,000 Units, Per NG Tube, Daily, Cass Libman, PA-C, 2,000 Units at 02/05/21 0947    dexamethasone (DECADRON) injection 6 mg, 6 mg, Intravenous, Q12H [facility], Libby Kinsey PA-C, 6 mg at 02/05/21 2105    famotidine (PEPCID) oral suspension 20 mg, 20 mg, Per NG Tube, BID, Cass Libman, PA-C, 20 mg at 02/05/21 1812    fentanyl citrate (PF) 100 MCG/2ML 50 mcg, 50 mcg, Intravenous, Q2H PRN, KATHY Flores, 50 mcg at 02/06/21 0300    furosemide (LASIX) injection 40 mg, 40 mg, Intravenous, BID (diuretic), Billie Balderas MD, 40 mg at 02/05/21 1630    heparin (porcine) 25,000 units in 0 45% NaCl 250 mL infusion (premix), 3-30 Units/kg/hr (Order-Specific), Intravenous, Titrated, Ana Proffer, DO, Last Rate: 12 5 mL/hr at 02/06/21 0621, 10 Units/kg/hr at 02/06/21 0621    heparin (porcine) injection 10,000 Units, 10,000 Units, Intravenous, Q1H PRN, Lyford Proffer, DO    heparin (porcine) injection 5,000 Units, 5,000 Units, Intravenous, Q1H PRN, [facility], , 5,000 Units at 02/05/21 2206    lactulose oral solution 10 g, 10 g, Oral, Daily, Shila Garcia MD, 10 g at 02/05/21 8224    levETIRAcetam (KEPPRA) 750 mg in sodium chloride 0 9 % 100 mL IVPB, 750 mg, Intravenous, Q12H [address], Shon Graham PA-C, Stopped at 02/05/21 2145    [COMPLETED] zinc sulfate (ZINCATE) capsule 220 mg, 220 mg, Per NG Tube, Daily, 220 mg at 01/24/21 0824 **FOLLOWED BY** multivitamin with iron-minerals liquid 15 mL, 15 mL, Per NG Tube, Daily, Ed Captain, PA-C, 15 mL at 02/05/21 0947    NOREPINEPHRINE 4 MG  ML NSS (CMPD ORDER) infusion, 1-30 mcg/min, Intravenous, Titrated, Ed Captain, PA-C, Last Rate: 22 5 mL/hr at 02/06/21 0600, 6 mcg/min at 02/06/21 0600    polyethylene glycol (MIRALAX) packet 17 g, 17 g, Oral, BID, Ed Captain, PA-C, 17 g at 02/05/21 2105    propofol (DIPRIVAN) 1000 mg in 100 mL infusion (premix), 5-50 mcg/kg/min, Intravenous, Titrated, Ed Captain, PA-C, Last Rate: 20 2 mL/hr at 02/06/21 0802, 20 mcg/kg/min at 02/06/21 0802    senna-docusate sodium (SENOKOT S) 8 6-50 mg per tablet 1 tablet, 1 tablet, Per NG Tube, HS, Ed Captain, PA-C, 1 tablet at 02/05/21 2105    vancomycin (VANCOCIN) 1,500 mg in sodium chloride 0 9 % 250 mL IVPB, 12 5 mg/kg (Adjusted), Intravenous, Daily PRN, Shila Garcia MD    Laboratory Results:  Results from last 7 days   Lab Units 02/06/21  0414 02/05/21  0525 02/04/21  0459 02/03/21  0504 02/03/21  0457 02/02/21  1250 02/02/21  0955 02/02/21  0442 02/01/21  2059 02/01/21  1058  02/01/21  0541   WBC Thousand/uL 15 08* 21 84* 24 84* 15 39*  --  13 36*  --  10 68*  --  12 07*  --   --    HEMOGLOBIN g/dL 9 7* 10 3* 11 8* 12 0  --  11 7*  --  10 6*  --  11 9*   < >  --    I STAT HEMOGLOBIN g/dl  --   --   --   --   --   --  11 2*  --  13 3  --   --   --    HEMATOCRIT % 32 8* 34 7* 38 9 41 6  --  40 7  --  36 3*  --  41 3 < >  --    HEMATOCRIT, ISTAT %  --   --   --   --   --   --  33*  --  39  --   --   --    PLATELETS Thousands/uL 181 196 232 224  --  222  --  204  --  222  --   --    POTASSIUM mmol/L 3 5 3 5 3 7  --  4 0  --   --  3 4*  --  4 3  --  4 2   CHLORIDE mmol/L 105 106 107  --  104  --   --  105  --  105  --  105   CO2 mmol/L 32 32 30  --  35*  --   --  32  --  36*  --  34*   CO2, I-STAT mmol/L  --   --   --   --   --   --  37*  --  36*  --   --   --    BUN mg/dL 52* 54* 59*  --  60*  --   --  64*  --  68*  --  64*   CREATININE mg/dL 1 50* 1 68* 1 86*  --  1 86*  --   --  1 81*  --  2 05*  --  1 94*   CALCIUM mg/dL 7 9* 7 7* 8 1*  --  8 4  --   --  8 1*  --  8 5  --  8 3   MAGNESIUM mg/dL 1 9  --  1 6  --  1 6  --   --   --   --   --   --   --    PHOSPHORUS mg/dL 4 5*  --  3 1  --  2 7  --   --   --   --   --   --   --    GLUCOSE, ISTAT mg/dl  --   --   --   --   --   --  105  --  148*  --   --   --     < > = values in this interval not displayed

## 2021-02-06 NOTE — PLAN OF CARE
Problem: Prexisting or High Potential for Compromised Skin Integrity  Goal: Skin integrity is maintained or improved  Description: INTERVENTIONS:  - Identify patients at risk for skin breakdown  - Assess and monitor skin integrity  - Assess and monitor nutrition and hydration status  - Monitor labs   - Assess for incontinence   - Turn and reposition patient  - Assist with mobility/ambulation  - Relieve pressure over bony prominences  - Avoid friction and shearing  - Provide appropriate hygiene as needed including keeping skin clean and dry  - Evaluate need for skin moisturizer/barrier cream  - Collaborate with interdisciplinary team   - Patient/family teaching  - Consider wound care consult   Outcome: Progressing     Problem: Potential for Falls  Goal: Patient will remain free of falls  Description: INTERVENTIONS:  - Assess patient frequently for physical needs  -  Identify cognitive and physical deficits and behaviors that affect risk of falls  -  [place] fall precautions as indicated by assessment   - Educate patient/family on patient safety including physical limitations  - Instruct patient to call for assistance with activity based on assessment  - Modify environment to reduce risk of injury  - Consider OT/PT consult to assist with strengthening/mobility  Outcome: Progressing     Problem: Nutrition/Hydration-ADULT  Goal: Nutrient/Hydration intake appropriate for improving, restoring or maintaining nutritional needs  Description: Monitor and assess patient's nutrition/hydration status for malnutrition  Collaborate with interdisciplinary team and initiate plan and interventions as ordered  Monitor patient's weight and dietary intake as ordered or per policy  Utilize nutrition screening tool and intervene as necessary  Determine patient's food preferences and provide high-protein, high-caloric foods as appropriate       INTERVENTIONS:  - Monitor oral intake, urinary output, labs, and treatment plans  - Assess nutrition and hydration status and recommend course of action  - Evaluate amount of meals eaten  - Assist patient with eating if necessary   - Allow adequate time for meals  - Recommend/ encourage appropriate diets, oral nutritional supplements, and vitamin/mineral supplements  - Order, calculate, and assess calorie counts as needed  - Recommend, monitor, and adjust tube feedings and TPN/PPN based on assessed needs  - Assess need for intravenous fluids  - Provide specific nutrition/hydration education as appropriate  - Include patient/family/caregiver in decisions related to nutrition  Outcome: Progressing     Problem: NEUROSENSORY - ADULT  Goal: Achieves stable or improved neurological status  Description: INTERVENTIONS  - Monitor and report changes in neurological status  - Monitor vital signs such as temperature, blood pressure, glucose, and any other labs ordered   - Initiate measures to prevent increased intracranial pressure  - Monitor for seizure activity and implement precautions if appropriate      Outcome: Progressing  Goal: Achieves maximal functionality and self care  Description: INTERVENTIONS  - Monitor swallowing and airway patency with patient fatigue and changes in neurological status  - Encourage and assist patient to increase activity and self care     - Encourage visually impaired, hearing impaired and aphasic patients to use assistive/communication devices  Outcome: Progressing     Problem: CARDIOVASCULAR - ADULT  Goal: Maintains optimal cardiac output and hemodynamic stability  Description: INTERVENTIONS:  - Monitor I/O, vital signs and rhythm  - Monitor for S/S and trends of decreased cardiac output  - Administer and titrate ordered vasoactive medications to optimize hemodynamic stability  - Assess quality of pulses, skin color and temperature  - Assess for signs of decreased coronary artery perfusion  - Instruct patient to report change in severity of symptoms  Outcome: Progressing  Goal: Absence of cardiac dysrhythmias or at baseline rhythm  Description: INTERVENTIONS:  - Continuous cardiac monitoring, vital signs, obtain 12 lead EKG if ordered  - Administer antiarrhythmic and heart rate control medications as ordered  - Monitor electrolytes and administer replacement therapy as ordered  Outcome: Progressing     Problem: RESPIRATORY - ADULT  Goal: Achieves optimal ventilation and oxygenation  Description: INTERVENTIONS:  - Assess for changes in respiratory status  - Assess for changes in mentation and behavior  - Position to facilitate oxygenation and minimize respiratory effort  - Oxygen administered by appropriate delivery if ordered  - Initiate smoking cessation education as indicated  - Encourage broncho-pulmonary hygiene including cough, deep breathe, Incentive Spirometry  - Assess the need for suctioning and aspirate as needed  - Assess and instruct to report SOB or any respiratory difficulty  - Respiratory Therapy support as indicated  Outcome: Progressing     Problem: GENITOURINARY - ADULT  Goal: Maintains or returns to baseline urinary function  Description: INTERVENTIONS:  - Assess urinary function  - Encourage oral fluids to ensure adequate hydration if ordered  - Administer IV fluids as ordered to ensure adequate hydration  - Administer ordered medications as needed  - Offer frequent toileting  - Follow urinary retention protocol if ordered  Outcome: Progressing  Goal: Absence of urinary retention  Description: INTERVENTIONS:  - Assess patients ability to void and empty bladder  - Monitor I/O  - Bladder scan as needed  - Discuss with physician/AP medications to alleviate retention as needed  - Discuss catheterization for long term situations as appropriate  Outcome: Progressing  Goal: Urinary catheter remains patent  Description: INTERVENTIONS:  - Assess patency of urinary catheter  - If patient has a chronic bo, consider changing catheter if non-functioning  - Follow guidelines for intermittent irrigation of non-functioning urinary catheter  Outcome: Progressing     Problem: METABOLIC, FLUID AND ELECTROLYTES - ADULT  Goal: Electrolytes maintained within normal limits  Description: INTERVENTIONS:  - Monitor labs and assess patient for signs and symptoms of electrolyte imbalances  - Administer electrolyte replacement as ordered  - Monitor response to electrolyte replacements, including repeat lab results as appropriate  - Instruct patient on fluid and nutrition as appropriate  Outcome: Progressing  Goal: Fluid balance maintained  Description: INTERVENTIONS:  - Monitor labs   - Monitor I/O and WT  - Instruct patient on fluid and nutrition as appropriate  - Assess for signs & symptoms of volume excess or deficit  Outcome: Progressing  Goal: Glucose maintained within target range  Description: INTERVENTIONS:  - Monitor Blood Glucose as ordered  - Assess for signs and symptoms of hyperglycemia and hypoglycemia  - Administer ordered medications to maintain glucose within target range  - Assess nutritional intake and initiate nutrition service referral as needed  Outcome: Progressing     Problem: MUSCULOSKELETAL - ADULT  Goal: Maintain or return mobility to safest level of function  Description: INTERVENTIONS:  - Assess patient's ability to carry out ADLs; assess patient's baseline for ADL function and identify physical deficits which impact ability to perform ADLs (bathing, care of mouth/teeth, toileting, grooming, dressing, etc )  - Assess/evaluate cause of self-care deficits   - Assess range of motion  - Assess patient's mobility  - Assess patient's need for assistive devices and provide as appropriate  - Encourage maximum independence but intervene and supervise when necessary  - Involve family in performance of ADLs  - Assess for home care needs following discharge   - Consider OT consult to assist with ADL evaluation and planning for discharge  - Provide patient education as appropriate  Outcome: Progressing  Goal: Maintain proper alignment of affected body part  Description: INTERVENTIONS:  - Support, maintain and protect limb and body alignment  - Provide patient/ family with appropriate education  Outcome: Progressing     Problem: INFECTION - ADULT  Goal: Absence or prevention of progression during hospitalization  Description: INTERVENTIONS:  - Assess and monitor for signs and symptoms of infection  - Monitor lab/diagnostic results  - Monitor all insertion sites, i e  indwelling lines, tubes, and drains  - Monitor endotracheal if appropriate and nasal secretions for changes in amount and color  - [place] appropriate cooling/warming therapies per order  - Administer medications as ordered  - Instruct and encourage patient and family to use good hand hygiene technique  - Identify and instruct in appropriate isolation precautions for identified infection/condition  Outcome: Progressing     Problem: DISCHARGE PLANNING  Goal: Discharge to home or other facility with appropriate resources  Description: INTERVENTIONS:  - Identify barriers to discharge w/patient and caregiver  - Arrange for needed discharge resources and transportation as appropriate  - Identify discharge learning needs (meds, wound care, etc )  - Arrange for interpretive services to assist at discharge as needed  - Refer to Case Management Department for coordinating discharge planning if the patient needs post-hospital services based on physician/advanced practitioner order or complex needs related to functional status, cognitive ability, or social support system  Outcome: Progressing     Problem: Knowledge Deficit  Goal: Patient/family/caregiver demonstrates understanding of disease process, treatment plan, medications, and discharge instructions  Description: Complete learning assessment and assess knowledge base    Interventions:  - Provide teaching at level of understanding  - Provide teaching via preferred learning methods  Outcome: Progressing     Problem: SAFETY,RESTRAINT: NV/NON-SELF DESTRUCTIVE BEHAVIOR  Goal: Remains free of harm/injury (restraint for non violent/non self-detsructive behavior)  Description: INTERVENTIONS:  - Instruct patient/family regarding restraint use   - Assess and monitor physiologic and psychological status   - Provide interventions and comfort measures to meet assessed patient needs   - Identify and implement measures to help patient regain control  - Assess readiness for release of restraint   Outcome: Progressing  Goal: Returns to optimal restraint-free functioning  Description: INTERVENTIONS:  - Assess the patient's behavior and symptoms that indicate continued need for restraint  - Identify and implement measures to help patient regain control  - Assess readiness for release of restraint   Outcome: Progressing

## 2021-02-07 LAB
ANION GAP SERPL CALCULATED.3IONS-SCNC: 8 MMOL/L (ref 4–13)
ANISOCYTOSIS BLD QL SMEAR: PRESENT
APTT PPP: 68 SECONDS (ref 23–37)
BACTERIA BLD CULT: NORMAL
BACTERIA BLD CULT: NORMAL
BACTERIA SPT RESP CULT: ABNORMAL
BACTERIA SPT RESP CULT: ABNORMAL
BASE EXCESS BLDA CALC-SCNC: 5.6 MMOL/L
BASO STIPL BLD QL SMEAR: PRESENT
BASOPHILS # BLD MANUAL: 0 THOUSAND/UL (ref 0–0.1)
BASOPHILS NFR MAR MANUAL: 0 % (ref 0–1)
BUN SERPL-MCNC: 59 MG/DL (ref 5–25)
CALCIUM SERPL-MCNC: 8.1 MG/DL (ref 8.3–10.1)
CHLORIDE SERPL-SCNC: 102 MMOL/L (ref 100–108)
CO2 SERPL-SCNC: 32 MMOL/L (ref 21–32)
CREAT SERPL-MCNC: 1.48 MG/DL (ref 0.6–1.3)
EOSINOPHIL # BLD MANUAL: 0 THOUSAND/UL (ref 0–0.4)
EOSINOPHIL NFR BLD MANUAL: 0 % (ref 0–6)
ERYTHROCYTE [DISTWIDTH] IN BLOOD BY AUTOMATED COUNT: 21 % (ref 11.6–15.1)
GFR SERPL CREATININE-BSD FRML MDRD: 50 ML/MIN/1.73SQ M
GLUCOSE SERPL-MCNC: 128 MG/DL (ref 65–140)
GLUCOSE SERPL-MCNC: 134 MG/DL (ref 65–140)
GLUCOSE SERPL-MCNC: 152 MG/DL (ref 65–140)
GLUCOSE SERPL-MCNC: 157 MG/DL (ref 65–140)
GLUCOSE SERPL-MCNC: 181 MG/DL (ref 65–140)
GRAM STN SPEC: ABNORMAL
GRAM STN SPEC: ABNORMAL
HCO3 BLDA-SCNC: 31.5 MMOL/L (ref 22–28)
HCT VFR BLD AUTO: 34.4 % (ref 36.5–49.3)
HGB BLD-MCNC: 9.9 G/DL (ref 12–17)
HOROWITZ INDEX BLDA+IHG-RTO: 50 MM[HG]
HYPERCHROMIA BLD QL SMEAR: PRESENT
LACTATE SERPL-SCNC: 0.8 MMOL/L (ref 0.5–2)
LYMPHOCYTES # BLD AUTO: 0.28 THOUSAND/UL (ref 0.6–4.47)
LYMPHOCYTES # BLD AUTO: 2 % (ref 14–44)
MAGNESIUM SERPL-MCNC: 2.5 MG/DL (ref 1.6–2.6)
MCH RBC QN AUTO: 23.2 PG (ref 26.8–34.3)
MCHC RBC AUTO-ENTMCNC: 28.8 G/DL (ref 31.4–37.4)
MCV RBC AUTO: 81 FL (ref 82–98)
METAMYELOCYTES NFR BLD MANUAL: 1 % (ref 0–1)
MONOCYTES # BLD AUTO: 0.43 THOUSAND/UL (ref 0–1.22)
MONOCYTES NFR BLD: 3 % (ref 4–12)
MRSA NOSE QL CULT: NORMAL
MYELOCYTES NFR BLD MANUAL: 1 % (ref 0–1)
NEUTROPHILS # BLD MANUAL: 13.2 THOUSAND/UL (ref 1.85–7.62)
NEUTS BAND NFR BLD MANUAL: 6 % (ref 0–8)
NEUTS SEG NFR BLD AUTO: 87 % (ref 43–75)
NRBC BLD AUTO-RTO: 0 /100 WBCS
O2 CT BLDA-SCNC: 13.9 ML/DL (ref 16–23)
OXYHGB MFR BLDA: 89.7 % (ref 94–97)
PCO2 BLDA: 52.3 MM HG (ref 36–44)
PEEP RESPIRATORY: 14 CM[H2O]
PH BLDA: 7.4 [PH] (ref 7.35–7.45)
PHOSPHATE SERPL-MCNC: 4.1 MG/DL (ref 2.3–4.1)
PLATELET # BLD AUTO: 213 THOUSANDS/UL (ref 149–390)
PLATELET BLD QL SMEAR: ADEQUATE
PO2 BLDA: 58.7 MM HG (ref 75–129)
POLYCHROMASIA BLD QL SMEAR: PRESENT
POTASSIUM SERPL-SCNC: 3.8 MMOL/L (ref 3.5–5.3)
PROCALCITONIN SERPL-MCNC: 0.45 NG/ML
RBC # BLD AUTO: 4.27 MILLION/UL (ref 3.88–5.62)
SODIUM SERPL-SCNC: 142 MMOL/L (ref 136–145)
SPECIMEN SOURCE: ABNORMAL
TOTAL CELLS COUNTED SPEC: 100
TRIGL SERPL-MCNC: 111 MG/DL
VANCOMYCIN SERPL-MCNC: 16 UG/ML
VENT AC: 18
VENT- AC: AC
VT SETTING VENT: 450 ML
WBC # BLD AUTO: 14.19 THOUSAND/UL (ref 4.31–10.16)

## 2021-02-07 PROCEDURE — 84100 ASSAY OF PHOSPHORUS: CPT | Performed by: PHYSICIAN ASSISTANT

## 2021-02-07 PROCEDURE — 94760 N-INVAS EAR/PLS OXIMETRY 1: CPT

## 2021-02-07 PROCEDURE — 94150 VITAL CAPACITY TEST: CPT

## 2021-02-07 PROCEDURE — 80048 BASIC METABOLIC PNL TOTAL CA: CPT | Performed by: PHYSICIAN ASSISTANT

## 2021-02-07 PROCEDURE — 94003 VENT MGMT INPAT SUBQ DAY: CPT

## 2021-02-07 PROCEDURE — 99291 CRITICAL CARE FIRST HOUR: CPT | Performed by: INTERNAL MEDICINE

## 2021-02-07 PROCEDURE — 82805 BLOOD GASES W/O2 SATURATION: CPT | Performed by: PHYSICIAN ASSISTANT

## 2021-02-07 PROCEDURE — 80202 ASSAY OF VANCOMYCIN: CPT | Performed by: PHYSICIAN ASSISTANT

## 2021-02-07 PROCEDURE — 84478 ASSAY OF TRIGLYCERIDES: CPT | Performed by: PHYSICIAN ASSISTANT

## 2021-02-07 PROCEDURE — 83605 ASSAY OF LACTIC ACID: CPT | Performed by: PHYSICIAN ASSISTANT

## 2021-02-07 PROCEDURE — 83735 ASSAY OF MAGNESIUM: CPT | Performed by: PHYSICIAN ASSISTANT

## 2021-02-07 PROCEDURE — 85730 THROMBOPLASTIN TIME PARTIAL: CPT | Performed by: PHYSICIAN ASSISTANT

## 2021-02-07 PROCEDURE — 85027 COMPLETE CBC AUTOMATED: CPT | Performed by: PHYSICIAN ASSISTANT

## 2021-02-07 PROCEDURE — 94762 N-INVAS EAR/PLS OXIMTRY CONT: CPT

## 2021-02-07 PROCEDURE — 84145 PROCALCITONIN (PCT): CPT | Performed by: PHYSICIAN ASSISTANT

## 2021-02-07 PROCEDURE — 99251 PR INITL INPATIENT CONSULT NEW/ESTAB PT 20 MIN: CPT | Performed by: RADIOLOGY

## 2021-02-07 PROCEDURE — 99232 SBSQ HOSP IP/OBS MODERATE 35: CPT | Performed by: INTERNAL MEDICINE

## 2021-02-07 PROCEDURE — 85007 BL SMEAR W/DIFF WBC COUNT: CPT | Performed by: PHYSICIAN ASSISTANT

## 2021-02-07 PROCEDURE — 82948 REAGENT STRIP/BLOOD GLUCOSE: CPT

## 2021-02-07 RX ORDER — POTASSIUM CHLORIDE 20MEQ/15ML
20 LIQUID (ML) ORAL ONCE
Status: COMPLETED | OUTPATIENT
Start: 2021-02-07 | End: 2021-02-07

## 2021-02-07 RX ORDER — MIDODRINE HYDROCHLORIDE 5 MG/1
5 TABLET ORAL
Status: DISCONTINUED | OUTPATIENT
Start: 2021-02-07 | End: 2021-02-08

## 2021-02-07 RX ORDER — FENTANYL CITRATE-0.9 % NACL/PF 10 MCG/ML
25 PLASTIC BAG, INJECTION (ML) INTRAVENOUS CONTINUOUS
Status: DISCONTINUED | OUTPATIENT
Start: 2021-02-07 | End: 2021-02-14

## 2021-02-07 RX ADMIN — DEXAMETHASONE SODIUM PHOSPHATE 4 MG: 4 INJECTION INTRA-ARTICULAR; INTRALESIONAL; INTRAMUSCULAR; INTRAVENOUS; SOFT TISSUE at 21:09

## 2021-02-07 RX ADMIN — DOCUSATE SODIUM AND SENNOSIDES 1 TABLET: 8.6; 5 TABLET, FILM COATED ORAL at 17:34

## 2021-02-07 RX ADMIN — DOCUSATE SODIUM AND SENNOSIDES 1 TABLET: 8.6; 5 TABLET, FILM COATED ORAL at 08:50

## 2021-02-07 RX ADMIN — POTASSIUM CHLORIDE 20 MEQ: 20 SOLUTION ORAL at 08:45

## 2021-02-07 RX ADMIN — Medication 2000 UNITS: at 08:50

## 2021-02-07 RX ADMIN — HEPARIN SODIUM 10 UNITS/KG/HR: 10000 INJECTION, SOLUTION INTRAVENOUS at 09:20

## 2021-02-07 RX ADMIN — INSULIN LISPRO 1 UNITS: 100 INJECTION, SOLUTION INTRAVENOUS; SUBCUTANEOUS at 06:08

## 2021-02-07 RX ADMIN — LEVETIRACETAM 750 MG: 100 SOLUTION ORAL at 21:55

## 2021-02-07 RX ADMIN — LACTULOSE 10 G: 10 SOLUTION ORAL at 08:49

## 2021-02-07 RX ADMIN — ERTAPENEM SODIUM 1000 MG: 1 INJECTION, POWDER, LYOPHILIZED, FOR SOLUTION INTRAMUSCULAR; INTRAVENOUS at 09:56

## 2021-02-07 RX ADMIN — INSULIN LISPRO 1 UNITS: 100 INJECTION, SOLUTION INTRAVENOUS; SUBCUTANEOUS at 00:22

## 2021-02-07 RX ADMIN — SERTRALINE 75 MG: 25 TABLET, FILM COATED ORAL at 08:50

## 2021-02-07 RX ADMIN — POLYETHYLENE GLYCOL 3350 17 G: 17 POWDER, FOR SOLUTION ORAL at 08:49

## 2021-02-07 RX ADMIN — MULTIVITAMIN 15 ML: LIQUID ORAL at 08:57

## 2021-02-07 RX ADMIN — Medication 3 MCG/MIN: at 00:27

## 2021-02-07 RX ADMIN — FENTANYL CITRATE 50 MCG: 50 INJECTION INTRAMUSCULAR; INTRAVENOUS at 21:29

## 2021-02-07 RX ADMIN — Medication 6 MG: at 21:08

## 2021-02-07 RX ADMIN — LEVETIRACETAM 750 MG: 100 SOLUTION ORAL at 08:58

## 2021-02-07 RX ADMIN — PROPOFOL 30 MCG/KG/MIN: 10 INJECTION, EMULSION INTRAVENOUS at 05:21

## 2021-02-07 RX ADMIN — CHLORHEXIDINE GLUCONATE 0.12% ORAL RINSE 15 ML: 1.2 LIQUID ORAL at 21:07

## 2021-02-07 RX ADMIN — Medication 75 MCG/HR: at 06:20

## 2021-02-07 RX ADMIN — ASPIRIN 81 MG CHEWABLE TABLET 81 MG: 81 TABLET CHEWABLE at 08:50

## 2021-02-07 RX ADMIN — Medication 75 MCG/HR: at 15:15

## 2021-02-07 RX ADMIN — Medication 20 MG: at 03:45

## 2021-02-07 RX ADMIN — MIDODRINE HYDROCHLORIDE 5 MG: 5 TABLET ORAL at 15:02

## 2021-02-07 RX ADMIN — MIDODRINE HYDROCHLORIDE 5 MG: 5 TABLET ORAL at 12:04

## 2021-02-07 RX ADMIN — POLYETHYLENE GLYCOL 3350 17 G: 17 POWDER, FOR SOLUTION ORAL at 21:07

## 2021-02-07 RX ADMIN — VANCOMYCIN HYDROCHLORIDE 1500 MG: 5 INJECTION, POWDER, LYOPHILIZED, FOR SOLUTION INTRAVENOUS at 09:21

## 2021-02-07 RX ADMIN — DESMOPRESSIN ACETATE 40 MG: 0.2 TABLET ORAL at 21:07

## 2021-02-07 RX ADMIN — CHLORHEXIDINE GLUCONATE 0.12% ORAL RINSE 15 ML: 1.2 LIQUID ORAL at 08:49

## 2021-02-07 RX ADMIN — FUROSEMIDE 40 MG: 10 INJECTION, SOLUTION INTRAVENOUS at 15:02

## 2021-02-07 RX ADMIN — Medication 7 MCG/MIN: at 10:56

## 2021-02-07 RX ADMIN — FUROSEMIDE 40 MG: 10 INJECTION, SOLUTION INTRAVENOUS at 08:49

## 2021-02-07 RX ADMIN — SERTRALINE HYDROCHLORIDE 50 MG: 50 TABLET ORAL at 21:07

## 2021-02-07 RX ADMIN — DEXAMETHASONE SODIUM PHOSPHATE 4 MG: 4 INJECTION INTRA-ARTICULAR; INTRALESIONAL; INTRAMUSCULAR; INTRAVENOUS; SOFT TISSUE at 08:50

## 2021-02-07 RX ADMIN — Medication 20 MG: at 15:02

## 2021-02-07 RX ADMIN — PROPOFOL 30 MCG/KG/MIN: 10 INJECTION, EMULSION INTRAVENOUS at 00:26

## 2021-02-07 RX ADMIN — CEFEPIME HYDROCHLORIDE 2000 MG: 2 INJECTION, POWDER, FOR SOLUTION INTRAVENOUS at 00:26

## 2021-02-07 NOTE — CONSULTS
Vancomycin IV Pharmacy-to-Dose Consultation    Alfredo Crawford is a 63 y o  male who was receiving Vancomycin IV with management by the Pharmacy Consult service  The patient's Vancomycin therapy has been completed / discontinued  Thank you for allowing us to take part in this patient's care  Pharmacy will sign-off now; please call or re-consult if there are any questions          Libertad Solis, PharmD  Pharmacist

## 2021-02-07 NOTE — CONSULTS
Consultation - Interventional Radiology  Vivienne Orr 63 y o  male MRN: [id number]  Unit/Bed#: ICU 08 Encounter: [id number]        Consults    ASSESSMENT/PLAN:    60M with COVID, multiorgan failure  He is ventilated and requires vasopressors  He currently has several working peripheral IVs as well as a midline  Ideally vasopressors will run through central access    GFR is 38-50, PICC line in arm is not preferred in this situation    He is contracted due to prior stroke and right neck access was not possible in the ICU  An experienced ICU PA accessed the left jugular vein and was unable to advance the wire  If central venous access is required this will probably require image guidance in the form of fluoroscopy  Patient would need to come to IR     Options include  - placement of neck non tunneled central venous catheter in IR  - conversion of midline into arm PICC line as risk benefit balance may favor keeping this access if neck access is difficult    ICU and IR team will reassess tomorrow    D/w Dr Celina Long in ICU      Problem List     * (Principal) Acute respiratory failure with hypoxia and hypercarbia (HCC)    Aortic stenosis, mild (Chronic)    Overview Signed 4/11/2018  7:59 AM by Judi Chan DO     Description: Severe  ECHO DONE 6-2014  SEVERE AS  PEAK GRADIENT-65mmHg  mean gradient was 38mmHg  MARY-0 7cm2  mild AI           Essential hypertension (Chronic)    Hyperlipidemia (Chronic)    Left bundle branch block (Chronic)    Murmur (Chronic)    Osteoarthritis of both knees (Chronic)    Pericardial disease    Sciatica    Age-related cataract of right eye (Chronic)    Venous insufficiency    Bilateral leg edema (Chronic)    Stress-induced cardiomyopathy    History of aortic valve replacement with bioprosthetic valve (Chronic)    Persistent atrial fibrillation (HCC)    MSSA bacteremia - Resolved septic shock    Acute blood loss anemia - Gastric ulcer    Leukocytosis    Acute metabolic encephalopathy    Skin rash    Acute renal failure    Lab Results   Component Value Date    EGFR 50 02/07/2021    EGFR 50 02/06/2021    EGFR 43 02/05/2021    CREATININE 1 48 (H) 02/07/2021    CREATININE 1 50 (H) 02/06/2021    CREATININE 1 68 (H) 02/05/2021         Coagulopathy (HCC)    GI bleeding    Overview Signed 2/25/2019  8:20 AM by Pratibha Dominguez PA-C     Added automatically from request for surgery [id number]         Age-related nuclear cataract, bilateral    Open angle with borderline findings, low risk, bilateral    Presence of intraocular lens    Vitreous degeneration of both eyes    Left arm swelling    Dysphagia    Cellulitis/myositis of left forearm    Left internal jugular vein thrombus    Morbid obesity     Depression    Abnormal CT of the head    Gastric ulcer    Overview Signed 4/17/2019 10:52 AM by Yamil Brito MA     Added automatically from request for surgery [id number]         Atrial fibrillation (HCC) (Chronic)    Pneumonia due to COVID-19 virus    Bacteremia    Hematuria    Acute kidney injury superimposed on CKD [facility])    Lab Results   Component Value Date    EGFR 50 02/07/2021    EGFR 50 02/06/2021    EGFR 43 02/05/2021    CREATININE 1 48 (H) 02/07/2021    CREATININE 1 50 (H) 02/06/2021    CREATININE 1 68 (H) 02/05/2021         Anemia    Hypernatremia    Seizure (HCC)    History of stroke    Positive D dimer in a COVID postitive patient           Reason for Consult / Principal Problem:    HPI: Eleazar Espinoza is a 63y o  year old male who presents with Acute respiratory failure with hypoxia and hypercarbia ([address] )      Review of Systems      Past Medical History:  Past Medical History:   Diagnosis Date    Allergic rhinitis     last assessed 9/12/12    Finger fracture, right     Closed fx of the middle phalanx of the right 5th finger  last assessed 1/30/14    GI bleed 2/22/2019    Hemorrhoids     last assessed 2/10/14    Hyperlipidemia     Hypertension     Stroke [facility])        Past Surgical History:  Past Surgical History:   Procedure Laterality Date    AORTIC VALVE REPLACEMENT  07/30/2014    AVR with 25mm [facility] Ease bovine pericardial valve    CARDIAC CATHETERIZATION  07/18/2014    SLB left main-normal, circumflex-normal, ramus intermedius-normal, RCA was large and dominant giving rise to the PDA & a large posterolateral branch  No disease  last assessed 8/19/14     COLONOSCOPY N/A 2/25/2019    Procedure: COLONOSCOPY;  Surgeon: Frankey Spillers, DO;  Location: [facility]; Service: Gastroenterology    ESOPHAGOGASTRODUODENOSCOPY N/A 2/22/2019    Procedure: ESOPHAGOGASTRODUODENOSCOPY (EGD)-roadshow overnight;  Surgeon: Frankey Spillers, DO;  [facility]: [facility]; Service: Gastroenterology    ESOPHAGOGASTRODUODENOSCOPY N/A 2/23/2019    Procedure: ESOPHAGOGASTRODUODENOSCOPY (EGD); Surgeon: Susan Phillips MD;  [facility]; Service: Gastroenterology    ESOPHAGOGASTRODUODENOSCOPY N/A 2/25/2019    Procedure: ESOPHAGOGASTRODUODENOSCOPY (EGD); Surgeon: Frankey Spillers, DO;  [facility];   Service: Gastroenterology    IR NON-TUNNELED CENTRAL LINE PLACEMENT  3/1/2019    IR NON-TUNNELED CENTRAL LINE PLACEMENT  2/4/2019    IR TUNNELED DIALYSIS CATHETER CHECK/CHANGE/REPOSITION/ANGIOPLASTY  4/18/2019    IR TUNNELED DIALYSIS CATHETER PLACEMENT  2/4/2019    IR TUNNELED DIALYSIS CATHETER PLACEMENT  2/18/2019    KNEE ARTHROSCOPY      KNEE CARTILAGE SURGERY Left     medial meniscus tear     TESTICLE SURGERY      TONSILLECTOMY AND ADENOIDECTOMY      TOOTH EXTRACTION         Social History:  Social History     Substance and Sexual Activity   Alcohol Use Never    Frequency: Never    Comment: ocassion /never drank alcohol per [employer]      Social History     Substance and Sexual Activity   Drug Use No     Social History     Tobacco Use   Smoking Status Never Smoker   Smokeless Tobacco Never Used       Family History:  Family History   Problem Relation Age of Onset    Lung cancer Mother     Heart disease Mother         pacemaker placement     Coronary artery disease Father     Hypertension Father     Heart attack Father     Cancer Family         bladder        Allergies:   Allergies   Allergen Reactions    Amiodarone      Developed Rash    Penicillins Rash     However, has subsequently tolerated Cefazolin and Cefepime       Medications:  Medications Prior to Admission   Medication    acetaminophen (TYLENOL) 325 mg tablet    allopurinol (ZYLOPRIM) 100 mg tablet    aspirin (ECOTRIN LOW STRENGTH) 81 mg EC tablet    atorvastatin (LIPITOR) 40 mg tablet    b complex-vitamin C-folic acid (NEPHROCAPS) 1 mg capsule    bisacodyl (DULCOLAX) 10 mg suppository    bumetanide (BUMEX) 1 mg tablet    Cholecalciferol (VITAMIN D3) 41538 units TABS    diltiazem (CARDIZEM CD) 180 mg 24 hr capsule    enoxaparin (LOVENOX) 40 mg/0 4 mL    magnesium oxide (MAG-OX) 400 mg    metoprolol tartrate (LOPRESSOR) 50 mg tablet    pantoprazole (PROTONIX) 40 mg tablet    POTASSIUM CHLORIDE PO    Probiotic Product (BACID PO)    sertraline (ZOLOFT) 100 mg tablet    sertraline (ZOLOFT) 25 mg tablet    traMADol (ULTRAM) 50 mg tablet     Current Facility-Administered Medications   Medication Dose Route Frequency    acetaminophen (TYLENOL) oral suspension 650 mg  650 mg Per NG Tube Q4H PRN    aspirin chewable tablet 81 mg  81 mg Oral Daily    atorvastatin (LIPITOR) tablet 40 mg  40 mg Per NG Tube HS    bisacodyl (DULCOLAX) rectal suppository 10 mg  10 mg Rectal Daily PRN    chlorhexidine (PERIDEX) 0 12 % oral rinse 15 mL  15 mL Swish & Spit Q12H [facility]    cholecalciferol (VITAMIN D3) tablet 2,000 Units  2,000 Units Per NG Tube Daily    dexamethasone (DECADRON) injection 4 mg  4 mg Intravenous Q12H [facility]    ertapenem (INVanz) 1,000 mg in sodium chloride 0 9 % 50 mL IVPB  1,000 mg Intravenous Q24H    fentaNYL 1000 mcg in sodium chloride 0 9% 100mL infusion  75 mcg/hr Intravenous Continuous    fentanyl citrate (PF) 100 MCG/2ML 50 mcg  50 mcg Intravenous Q1H PRN    furosemide (LASIX) injection 40 mg  40 mg Intravenous BID (diuretic)    heparin (porcine) 25,000 units in 0 45% NaCl 250 mL infusion (premix)  3-30 Units/kg/hr (Order-Specific) Intravenous Titrated    heparin (porcine) injection 10,000 Units  10,000 Units Intravenous Q1H PRN    heparin (porcine) injection 5,000 Units  5,000 Units Intravenous Q1H PRN    insulin lispro (HumaLOG) 100 units/mL subcutaneous injection 1-6 Units  1-6 Units Subcutaneous Q6H [address]    lactulose oral solution 10 g  10 g Oral Daily    levETIRAcetam (KEPPRA) oral solution 750 mg  750 mg Oral Q12H [address]    melatonin tablet 6 mg  6 mg Oral HS    midodrine (PROAMATINE) tablet 5 mg  5 mg Oral TID AC    multivitamin with iron-minerals liquid 15 mL  15 mL Per NG Tube Daily    NOREPINEPHRINE 4 MG  ML NSS (CMPD ORDER) infusion  1-30 mcg/min Intravenous Titrated    omeprazole (PRILOSEC) suspension 2 mg/mL  20 mg Oral Q12H    polyethylene glycol (MIRALAX) packet 17 g  17 g Oral BID    propofol (DIPRIVAN) 1000 mg in 100 mL infusion (premix)  5-50 mcg/kg/min Intravenous Titrated    senna-docusate sodium (SENOKOT S) 8 6-50 mg per tablet 1 tablet  1 tablet Per NG Tube BID    sertraline (ZOLOFT) tablet 50 mg  50 mg Oral HS    sertraline (ZOLOFT) tablet 75 mg  75 mg Oral Daily       Vitals:  /55   Pulse 88   Temp (!) 97 2 °F (36 2 °C)   Resp 22   Ht 5' 11" (1 803 m)   Wt (!) 166 kg (365 lb 15 4 oz)   SpO2 97%   BMI 51 04 kg/m²   Body mass index is 51 04 kg/m²    Weight (last 2 days)     Date/Time   Weight    02/07/21 0600   (!) 166 (365 96)    02/06/21 0600   (!) 165 (364 2)    02/05/21 0535   (!) 166 (365 3)              I/Os:    Intake/Output Summary (Last 24 hours) at 2/7/2021 1547  Last data filed at 2/7/2021 1509  Gross per 24 hour   Intake 3204 75 ml   Output 2610 ml   Net 594 75 ml       PHYSICAL EXAM    Visual examination through the glass due to COVID status    Multiple lines and drips running, on ventilator, sitting up 45°      Lab Results and Cultures:   CBC with diff:   Lab Results   Component Value Date    WBC 14 19 (H) 02/07/2021    HGB 9 9 (L) 02/07/2021    HCT 34 4 (L) 02/07/2021    MCV 81 (L) 02/07/2021     02/07/2021    MCH 23 2 (L) 02/07/2021    MCHC 28 8 (L) 02/07/2021    RDW 21 0 (H) 02/07/2021    MPV 12 4 02/06/2021    NRBC 0 02/07/2021      BMP/CMP:  Lab Results   Component Value Date     04/09/2015    K 3 8 02/07/2021    K 4 3 04/09/2015     02/07/2021     04/09/2015    CO2 32 02/07/2021    CO2 37 (H) 02/02/2021    ANIONGAP 13 04/09/2015    BUN 59 (H) 02/07/2021    BUN 17 04/09/2015    CREATININE 1 48 (H) 02/07/2021    CREATININE 0 67 04/09/2015    GLUCOSE 105 02/02/2021    GLUCOSE 92 04/09/2015    CALCIUM 8 1 (L) 02/07/2021    CALCIUM 8 4 04/09/2015    AST 19 02/05/2021    AST 15 10/30/2014    ALT 19 02/05/2021    ALT 22 10/30/2014    ALKPHOS 67 02/05/2021    ALKPHOS 104 10/30/2014    PROT 7 7 10/30/2014    BILITOT 0 4 10/30/2014    EGFR 50 02/07/2021   ,     Coags:   Lab Results   Component Value Date    PTT 68 (H) 02/07/2021    PTT 28 10/30/2014    INR 1 22 (H) 02/02/2021    INR 1 12 10/31/2014   ,   Results from last 7 days   Lab Units 02/07/21  0344 02/06/21  1911 02/06/21  1327 02/06/21  0414 02/05/21  2104  02/02/21  1250   PTT seconds 68* 60* 66* 114* 51*   < > 31   INR   --   --   --   --   --   --  1 22*    < > = values in this interval not displayed          Lipid Panel:   Lab Results   Component Value Date    CHOL 146 07/18/2014     Lab Results   Component Value Date    HDL 44 02/13/2019     Lab Results   Component Value Date    HDL 44 02/13/2019     Lab Results   Component Value Date    LDLCALC 77 02/13/2019     Lab Results   Component Value Date    TRIG 111 02/07/2021       HgbA1c:   Lab Results   Component Value Date    HGBA1C 6 2 02/13/2019    HGBA1C 5 8 (H) 07/18/2014       Blood Culture:   Lab Results Component Value Date    BLOODCX No Growth at 48 hrs  02/04/2021    BLOODCX No Growth at 48 hrs  02/04/2021   ,   Urinalysis:   Lab Results   Component Value Date    COLORU Yellow 02/01/2021    COLORU Yellow 10/30/2014    CLARITYU Clear 02/01/2021    CLARITYU Clear 10/30/2014    SPECGRAV 1 010 02/01/2021    SPECGRAV 1 009 10/30/2014    PHUR 5 5 02/01/2021    PHUR 5 5 01/23/2019    PHUR 5 0 10/30/2014    LEUKOCYTESUR Negative 02/01/2021    LEUKOCYTESUR Negative 10/30/2014    NITRITE Negative 02/01/2021    NITRITE Negative 10/30/2014    PROTEINUA Negative 10/30/2014    GLUCOSEU Negative 02/01/2021    GLUCOSEU Negative 10/30/2014    KETONESU Negative 02/01/2021    KETONESU Negative 10/30/2014    BILIRUBINUR Negative 02/01/2021    BILIRUBINUR Negative 10/30/2014    BLOODU Trace-Intact (A) 02/01/2021    BLOODU Negative 10/30/2014   ,   Urine Culture:   Lab Results   Component Value Date    URINECX 9170-1730 cfu/ml Gram Positive Cocci (A) 01/23/2019   ,   Wound Culure:  No results found for: WOUNDCULT    Imaging Studies: I have personally reviewed pertinent films in PACS    Counseling / Coordination of Care  Total time spent today  30 minutes  Greater than 50% of total time was spent with the patient and / or family counseling and / or coordination of care

## 2021-02-07 NOTE — PROGRESS NOTES
NEPHROLOGY PROGRESS NOTE   Joanna Lorenz 63 y o  male MRN: [id number]  Unit/Bed#: ICU 08 Encounter: [id number]  Reason for Consult: PATRICK      ASSESSMENT and PLAN:    Acute kidney injury/ acute tubular necrosis  -- secondary to COVID-19, cytokine storm, contrast associated nephropathy possible and possible vancomycin toxicity though I suspect less likely  --Urinalysis:  Large blood, innumerable RBCs, fine and coarse granular casts, 2+ protein  --excellent urine output, creatinine has improved somewhat stable today with a creatinine of 1 5 mg/dL with stable electrolytes  --Lasix IV 40 mg twice a day aiming for net negative fluid balance      Chronic kidney disease stage III  -- baseline creatinine 1 1-1 3 mg/dL, anticipate a possible higher baseline creatinine given this episode of acute kidney injury        COVID-19  -- severe pathway, management as per critical care      Hypokalemia  --  Resolved, monitor with diuresis      Blood pressure/volume status  -- excellent urine output  -- currently being diuresed with IV Lasix 40 mg b i d   -- overall net -12 L since admission  -- was net positive over the last 24 hours approximately 400 cc  -- blood pressure is labile    Respiratory acidosis  -- pH close to normal with renal compensation  -- vent management as per critical care         SUBJECTIVE / INTERVAL HISTORY:    No overnight events    OBJECTIVE:  Current Weight: Weight - Scale: (!) 166 kg (365 lb 15 4 oz)  Vitals:    02/07/21 0645 02/07/21 0700 02/07/21 0715 02/07/21 0726   BP:       BP Location:       Pulse: 96 99 101    Resp: (!) 52 (!) 56 (!) 51    Temp: 98 4 °F (36 9 °C) 98 6 °F (37 °C) 98 8 °F (37 1 °C)    TempSrc:       SpO2: 94% 95% 95% 94%   Weight:       Height:           Intake/Output Summary (Last 24 hours) at 2/7/2021 0839  Last data filed at 2/7/2021 0600  Gross per 24 hour   Intake 2982 46 ml   Output 2585 ml   Net 397 46 ml       Review of Systems:    Unable to obtain review of systems as this patient is intubated and sedated  Physical Exam  Vitals signs and nursing note reviewed  Constitutional:       Appearance: He is obese  He is ill-appearing  Comments: Intubated and sedated   HENT:      Head: Normocephalic and atraumatic  Eyes:      General: No scleral icterus  Cardiovascular:      Rate and Rhythm: Bradycardia present  Pulmonary:      Comments: intubated  Abdominal:      General: There is no distension  Tenderness: There is no guarding  Musculoskeletal:      Right lower leg: Edema present  Left lower leg: Edema present     Neurological:      Comments: sedated         Medications:    Current Facility-Administered Medications:     acetaminophen (TYLENOL) oral suspension 650 mg, 650 mg, Per NG Tube, Q4H PRN, Ellen Pak PA-C    aspirin chewable tablet 81 mg, 81 mg, Oral, Daily, KATHY Domingo    atorvastatin (LIPITOR) tablet 40 mg, 40 mg, Per NG Tube, HS, Ellen Pak PA-C, 40 mg at 02/06/21 2130    bisacodyl (DULCOLAX) rectal suppository 10 mg, 10 mg, Rectal, Daily PRN, Ellen Pak PA-C    chlorhexidine (PERIDEX) 0 12 % oral rinse 15 mL, 15 mL, Swish & Spit, Q12H [address], Ellen Pak PA-C, 15 mL at 02/06/21 2132    cholecalciferol (VITAMIN D3) tablet 2,000 Units, 2,000 Units, Per NG Tube, Daily, Ellen Pak PA-C, 2,000 Units at 02/06/21 0911    dexamethasone (DECADRON) injection 4 mg, 4 mg, Intravenous, Q12H [address], KATHY Domingo, 4 mg at 02/06/21 2131    fentaNYL 1000 mcg in sodium chloride 0 9% 100mL infusion, 75 mcg/hr, Intravenous, Continuous, Darwin Villagran PA-C, Last Rate: 7 5 mL/hr at 02/07/21 0620, 75 mcg/hr at 02/07/21 0620    fentanyl citrate (PF) 100 MCG/2ML 50 mcg, 50 mcg, Intravenous, Q1H PRN, KATHY Otero, 50 mcg at 02/06/21 2127    furosemide (LASIX) injection 40 mg, 40 mg, Intravenous, BID (diuretic), Scott Madrid MD, 40 mg at 02/06/21 6900    heparin (porcine) 25,000 units in 0 45% NaCl 250 mL infusion (premix), 3-30 Units/kg/hr (Order-Specific), Intravenous, Titrated, Jana Stevenson DO, Last Rate: 12 5 mL/hr at 02/06/21 1257, 10 Units/kg/hr at 02/06/21 1257    heparin (porcine) injection 10,000 Units, 10,000 Units, Intravenous, Q1H PRN, Donice Lies, DO    heparin (porcine) injection 5,000 Units, 5,000 Units, Intravenous, Q1H PRN, Donice Lies, DO, 5,000 Units at 02/05/21 2206    insulin lispro (HumaLOG) 100 units/mL subcutaneous injection 1-6 Units, 1-6 Units, Subcutaneous, Q6H [address], 1 Units at 02/07/21 0608 **AND** Fingerstick Glucose (POCT), , , Q6H, KATHY    lactulose oral solution 10 g, 10 g, Oral, Daily, Humaira Santamaria MD, 10 g at 02/06/21 0910    levETIRAcetam (KEPPRA) oral solution 750 mg, 750 mg, Oral, Q12H [address], KATHY Domingo, 750 mg at 02/06/21 2151    melatonin tablet 6 mg, 6 mg, Oral, HS, KATHY Domingo, 6 mg at 02/06/21 2132    [COMPLETED] zinc sulfate (ZINCATE) capsule 220 mg, 220 mg, Per NG Tube, Daily, 220 mg at 01/24/21 0824 **FOLLOWED BY** multivitamin with iron-minerals liquid 15 mL, 15 mL, Per NG Tube, Daily, Isaías Carrera PA-C, 15 mL at 02/06/21 0910    NOREPINEPHRINE 4 MG  ML NSS (CMPD ORDER) infusion, 1-30 mcg/min, Intravenous, Titrated, KATHY REAL, Last Rate: 30 mL/hr at 02/07/21 0600, 8 mcg/min at 02/07/21 0600    omeprazole (PRILOSEC) suspension 2 mg/mL, 20 mg, Oral, Q12H, KATHY Domingo, 20 mg at 02/07/21 0345    piperacillin-tazobactam (ZOSYN) 3 375 g in sodium chloride 0 9 % 100 mL IVPB, 3 375 g, Intravenous, Q6H, Klaudia Barlow PA-C    polyethylene glycol (MIRALAX) packet 17 g, 17 g, Oral, BID, Isaías Carrera PA-C, 17 g at 02/06/21 2132    potassium chloride oral solution 20 mEq, 20 mEq, Oral, Once, Klaudia Barlow PA-C    propofol (DIPRIVAN) 1000 mg in 100 mL infusion (premix), 5-50 mcg/kg/min, Intravenous, Titrated, Isaías Carrera PA-C, Stopped at 02/07/21 1161   senna-docusate sodium (SENOKOT S) 8 6-50 mg per tablet 1 tablet, 1 tablet, Per NG Tube, BID, Rise Key, CRNP, 1 tablet at 02/06/21 1759    sertraline (ZOLOFT) tablet 50 mg, 50 mg, Oral, HS, Rise Tavares, CRNP, 50 mg at 02/06/21 2131    sertraline (ZOLOFT) tablet 75 mg, 75 mg, Oral, Daily, Rise Tavares, CRNP    vancomycin (VANCOCIN) 1,500 mg in sodium chloride 0 9 % 250 mL IVPB, 12 5 mg/kg (Adjusted), Intravenous, Daily PRN, Chika Pop MD    vancomycin (VANCOCIN) 1,500 mg in sodium chloride 0 9 % 250 mL IVPB, 12 5 mg/kg (Adjusted), Intravenous, Once, KATHY Castillo    Laboratory Results:  Results from last 7 days   Lab Units 02/07/21  0332 02/06/21  0414 02/05/21  0525 02/04/21  0459 02/03/21  0504 02/03/21  0457 02/02/21  1250 02/02/21  0955 02/02/21  0442 02/01/21  2059  02/01/21  1058   WBC Thousand/uL 14 19* 15 08* 21 84* 24 84* 15 39*  --  13 36*  --  10 68*  --   --  12 07*   HEMOGLOBIN g/dL 9 9* 9 7* 10 3* 11 8* 12 0  --  11 7*  --  10 6*  --   --  11 9*   I STAT HEMOGLOBIN g/dl  --   --   --   --   --   --   --  11 2*  --  13 3   < >  --    HEMATOCRIT % 34 4* 32 8* 34 7* 38 9 41 6  --  40 7  --  36 3*  --   --  41 3   HEMATOCRIT, ISTAT %  --   --   --   --   --   --   --  33*  --  39   < >  --    PLATELETS Thousands/uL 213 181 196 232 224  --  222  --  204  --   --  222   POTASSIUM mmol/L 3 8 3 5 3 5 3 7  --  4 0  --   --  3 4*  --   --  4 3   CHLORIDE mmol/L 102 105 106 107  --  104  --   --  105  --   --  105   CO2 mmol/L 32 32 32 30  --  35*  --   --  32  --   --  36*   CO2, I-STAT mmol/L  --   --   --   --   --   --   --  37*  --  36*  --   --    BUN mg/dL 59* 52* 54* 59*  --  60*  --   --  64*  --   --  68*   CREATININE mg/dL 1 48* 1 50* 1 68* 1 86*  --  1 86*  --   --  1 81*  --   --  2 05*   CALCIUM mg/dL 8 1* 7 9* 7 7* 8 1*  --  8 4  --   --  8 1*  --   --  8 5   MAGNESIUM mg/dL 2 5 1 9  --  1 6  --  1 6  --   --   --   --   --   --    PHOSPHORUS mg/dL 4 1 4 5*  -- 3 1  --  2 7  --   --   --   --   --   --    GLUCOSE, ISTAT mg/dl  --   --   --   --   --   --   --  105  --  148*  --   --     < > = values in this interval not displayed

## 2021-02-07 NOTE — NURSING NOTE
Consult received for bedside PICC placement  Pt has hx of CKD 3, d/w Dr Juan Alberto Sanches who prefers line to be placed by IR  Critical care AP made aware

## 2021-02-07 NOTE — QUICK NOTE
Provided an update to patient's wife, Isabel Zamarripa, over the phone regaring patient's current status and treatments  All questions answered  He expressed understanding and appreciation

## 2021-02-07 NOTE — PROGRESS NOTES
Daily Progress Note - Critical Care   Janell Gupta 63 y o  male MRN: [id number]  Unit/Bed#: ICU 08 Encounter: [id number]        ----------------------------------------------------------------------------------------  HPI/24hr events: Patient was held off of levo then had to be placed on increased dosing of levo due to soft pressures  Lactate 0 8    ---------------------------------------------------------------------------------------  SUBJECTIVE  Intubated  Patient is sedated but easily arousable and follows commands  Review of Systems  Review of systems was unable to be performed secondary to intubation  ---------------------------------------------------------------------------------------  Assessment and Plan:    Neuro:   · Acute toxic/metabolic encephalopathy  ? Related to continuous sedation and critical illness  ? Routine neuro checks     ? Sleep/wake cycle regulation as able   § Melatonin 6mg qHS  · Seizure disorder  ? Keppra 750mg q12h PO  · Prior CVA  ? Resume ASA 81mg daily  · Major depressive disorder  ? Resumed home sertraline 75mg daily, 50mg qHS   ? Seen by behavioral health on 1/22/21  · Sedation/analgesia   ? Continuous infusions:   § Propofol weaned off  § Fentanyl 75 wean as necessary  § Use PRN's more as needed to facilitate decreasing continuing infusions   § Goal RASS 0 to -1  ? PRN medications   § 2 dose / 24 hours for Fentanyl PRN        CV:   · Shock  ? Levophed 8mcg/min   ? Titrate to MAP > 65   ? BP not improving requiring levophed  ? Continued soft blood pressures  ? Continues to tolerate diuresis   ? Continue holding Home medications including Bumex 2mg daily, Cardizem CD 180mg q24h, and Metoprolol tartrate 50mg TID   · Paroxysmal A fib with RVR  ? Currently NSR, rate controlled  ? Continue telemetry monitoring  ? Holding home metoprolol secondary to vasopressors  ? Holding home [facility] because of hx GI bleed  ?  Heparin drip at this time   · Chronic diastolic CHF  ? 5/21/99 Echo: EF 55%, bioprosthetic AV  ? Continue aggressive diuresis as long as renal function tolerates  ? Lasix 40mg BID- Lasix with albumin   ? BNP 1470 (21)   · Hyperlipidemia   ? Atorvastatin 40mg qHS         Pulm:  · Acute hypoxic respiratory failure secondary to COVID-19 (POA)   ? Mechanical Ventilation Day # 6  ? P:F ratio on most recent AB (PEEP 14) -Now 117 improving mildly  ? Maintain VT 6-8ml/kg IBW (450-600): 75 3 kg- at around goal   ? Maintain SpO2 > 90% per protocol as able   ? Suction as needed and closely monitor secretions  Maintain HOB >30 degrees  Q4h oral care with chlorhexidine BID  ? Maintain Ppeak < 45cm H2O, Pplat < 30cm H2O as possible  ? ABG - pH 7 40 pCO2 52 3 pO2 58 7 HCO3 31 5  ? See ID for COVID-related treatment plan      GI:   · Prior GI bleed   ? Given steroids and anticoagulation, risk of withholding PPI outweighs possible benefit to COVID with famotidine  ? Will discontinue famotidine and start omeprazole 20mg q12h   · Bowel regimen: Has bowel movement  ? Lactulose 10mg daily   ? Miralax daily   ? Senna/colace qHS  § Increase to BID     :   · PATRICK superimposed on CKD-3 secondary to systemic effects of COVID, RUFINA, hypotension/shock  ? Nephrology following, appreciate recommendations   ? Baseline creatinine: 1 1 - 1 3  ? Admission creatinine: 1 34  ? Creatinine peak: 2 71  ? Current creatinine: 1 48 from 1 50  ? Strict q2h I/O monitoring  ? Maintain bo catheter   ? Continue to follow renal function tests     F/E/N:   · Fluids:   ? Maintenance fluids: None  · Electrolytes:   ? Replete electrolytes with as needed to maintain K >4 0, Mag >2 0, Phos >3 0  ? Replaced potassium and magnesium this AM  · Nutrition:    ? Changed to jevity 1 5 per nutrition recommendations      Heme/Onc:   · Anemia  ? Multifactorial secondary to PATRICK on CKD, phlebotomy  ? Concern for ongoing drop despite negative fluid balance  ? Baseline hemoglobin: 9 6 - 8 1   ? Admission hemoglobin: 12 5  ?  Current hemoglobin: 9 7  ? Transfuse for hemoglobin <7 0  ? Closely monitor for signs of GI bleeding  · Hypercoaguable state secondary to COVID-19  ? D-Dimer: 1 47 (2/6/21)   § Down from peak of 19 40  ? Continue systemic anticoagulation with heparin drip   · VTE prophylaxis:  Heparin gtt, SCD's to BLE     Endo:   · Steroid-induced hyperglycemia  ? Last hemoglobin A1c 6 2% on 2/13/19  ? Add low-dose insulin regimen  ? Adjust as needed to maintain goal -180        ID:   · COVID-19 pneumonia (POA)   ? 1/17/21 COVID-19: Positive   ? Continue anti-inflammatories/COVID therapies:   ? Changed to Dexamethasone 4 mg IV q12h (day 9/10)  ? Atorvastatin 40mg qHS   ? MVI daily   ? Vitamin D3 2000 IU daily  ? Completed therapies:   ? Convalescent Plasma (1/17/21)  ? Actemra (1/28/21)  ? Vitamin C 1000mg BID   ? Zinc 220mg daily   ? Inflammatory markers:   ? Ferritin: 170 (2/6/21), CRP: 66 7 (2/6/21)- Trend on Monday  · Possible sepsis- Hypotensive with elevated creainine  ? Cultures from [facility] with 1/2 sets growing separate colonies of staph coag-negative -Most likely contamination  ? Repeat cultures from [facility] with enterococcus faecalis and staph coag negative   ? ID consult possibly  ? TTE negative for vegetation   ? Surveillance cultures negative to date   ? Needs outpatient blood cultures on 2/11/20  ? ID signed off   · Possible superimposed bacteria pneumonia  ? Continue broad-spectrum antibiotics   ? Ertapenam instead of Cefepime with Vancomycin  ? Procalcitonin: 0 96 (2/6/21)  ? 2/4/21 BCXx2: No growth   ? 2/4/21Sputum Cx: Few colonies of E coli ESBL   ? 2/5/21 MRSA pending  ?  Continue to monitor fever and WBC curve   ? CBCD daily     MSK/Skin:   · PT/OT when medically appropriate   · Reposition q2h, eliminate pressure points while in bed- prone patient  · Close skin surveillance     Disposition: Continue Stepdown Level 1 level of care   Code Status: Level 1 - Full Code  ---------------------------------------------------------------------------------------  ICU CORE MEASURES    Prophylaxis   VTE Pharmacologic Prophylaxis: Heparin Drip  VTE Mechanical Prophylaxis: sequential compression device  Stress Ulcer Prophylaxis: Omeprazole PO    ABCDE Protocol (if indicated)  Plan to perform spontaneous awakening trial today? No  Plan to perform spontaneous breathing trial today? No  Obvious barriers to extubation? No  CAM-ICU: Negative    Invasive Devices Review  Invasive Devices     Peripheral Intravenous Line            Long-Dwell Peripheral IV (Midline) 37/36/44 Left Cephalic 12 days    Peripheral IV 21 Right;Ventral (anterior) Arm 1 day    Peripheral IV 21 Left;Ventral (anterior) Forearm less than 1 day          Arterial Line            Arterial Line 21 Radial 3 days          Drain            Urethral Catheter Straight-tip 16 Fr  20 days    NG/OG/Enteral Tube Orogastric 5 days          Airway            ETT  Cuffed; Hi-Lo 8 mm 5 days              Can any invasive devices be discontinued today?  No  ---------------------------------------------------------------------------------------  OBJECTIVE    Vitals   Vitals:    21 0000 21 0100 21 0200 21 0452   BP:       BP Location:       Pulse: 72 69 68    Resp: (!) 23 20 (!) 46    Temp: (!) 95 9 °F (35 5 °C) (!) 95 7 °F (35 4 °C) (!) 95 5 °F (35 3 °C)    TempSrc: Bladder      SpO2: 90% 93% 94% 94%   Weight:       Height:         Temp (24hrs), Av 8 °F (36 °C), Min:95 5 °F (35 3 °C), Max:97 2 °F (36 2 °C)  Current: Temperature: (!) 95 5 °F (35 3 °C)      Respiratory:  SpO2: SpO2: 94 %, SpO2 Activity: SpO2 Activity: At Rest, SpO2 Device: O2 Device: Ventilator, Capnography: Capnography: No  Nasal Cannula O2 Flow Rate (L/min): 50 L/min    Invasive/non-invasive ventilation settings   Respiratory    Lab Data (Last 4 hours)    None         O2/Vent Data (Last 4 hours)    None                Physical Exam  Vitals signs reviewed  Constitutional:       Appearance: He is well-developed  He is obese  He is ill-appearing  HENT:      Head: Normocephalic and atraumatic  Eyes:      Conjunctiva/sclera: Conjunctivae normal    Neck:      Musculoskeletal: Neck supple  Cardiovascular:      Rate and Rhythm: Normal rate and regular rhythm  Heart sounds: Normal heart sounds  No murmur  Pulmonary:      Effort: No respiratory distress  Breath sounds: Normal breath sounds  Abdominal:      Palpations: Abdomen is soft  Tenderness: There is no abdominal tenderness  Musculoskeletal:      Right lower leg: 3+ Pitting Edema present  Left lower leg: 3+ Pitting Edema present  Skin:     General: Skin is warm and dry  Neurological:      Mental Status: He is alert  Laboratory and Diagnostics:  Results from last 7 days   Lab Units 02/07/21  0332 02/06/21  0414 02/05/21  0525 02/04/21  0459 02/03/21  0504 02/02/21  1250 02/02/21  0955 02/02/21  0442  02/01/21  1058   WBC Thousand/uL 14 19* 15 08* 21 84* 24 84* 15 39* 13 36*  --  10 68*  --  12 07*   HEMOGLOBIN g/dL 9 9* 9 7* 10 3* 11 8* 12 0 11 7*  --  10 6*  --  11 9*   I STAT HEMOGLOBIN g/dl  --   --   --   --   --   --  11 2*  --    < >  --    HEMATOCRIT % 34 4* 32 8* 34 7* 38 9 41 6 40 7  --  36 3*  --  41 3   HEMATOCRIT, ISTAT %  --   --   --   --   --   --  33*  --    < >  --    PLATELETS Thousands/uL 213 181 196 232 224 222  --  204  --  222   NEUTROS PCT %  --  92*  --  91*  --   --   --  91*  --   --    BANDS PCT % 6  --   --   --  2  --   --   --   --   --    MONOS PCT %  --  3*  --  3*  --   --   --  4  --   --    MONO PCT % 3*  --  1*  --  3*  --   --   --   --  2*    < > = values in this interval not displayed       Results from last 7 days   Lab Units 02/07/21  0332 02/06/21  0414 02/05/21  0525 02/04/21  0459 02/03/21  0457 02/02/21  0955 02/02/21  0442  02/01/21  1058   SODIUM mmol/L 142 143 143 144 144  --  142  --  145   POTASSIUM mmol/L 3 8 3 5 3 5 3 7 4 0  --  3 4*  --  4 3   CHLORIDE mmol/L 102 105 106 107 104  --  105  --  105   CO2 mmol/L 32 32 32 30 35*  --  32  --  36*   CO2, I-STAT mmol/L  --   --   --   --   --  37*  --    < >  --    ANION GAP mmol/L 8 6 5 7 5  --  5  --  4   BUN mg/dL 59* 52* 54* 59* 60*  --  64*  --  68*   CREATININE mg/dL 1 48* 1 50* 1 68* 1 86* 1 86*  --  1 81*  --  2 05*   CALCIUM mg/dL 8 1* 7 9* 7 7* 8 1* 8 4  --  8 1*  --  8 5   GLUCOSE RANDOM mg/dL 181* 176* 147* 144* 101  --  135  --  104   ALT U/L  --   --  19 16 20  --  22  --  23   AST U/L  --   --  19 13 16  --  15  --  14   ALK PHOS U/L  --   --  67 63 64  --  56  --  60   ALBUMIN g/dL  --   --  2 3* 2 8* 3 1*  --  2 8*  --  3 3*   TOTAL BILIRUBIN mg/dL  --   --  0 54 0 60 0 58  --  0 51  --  0 64    < > = values in this interval not displayed  Results from last 7 days   Lab Units 02/07/21  0332 02/06/21  0414 02/04/21  0459 02/03/21  0457   MAGNESIUM mg/dL 2 5 1 9 1 6 1 6   PHOSPHORUS mg/dL 4 1 4 5* 3 1 2 7      Results from last 7 days   Lab Units 02/07/21  0344 02/06/21  1911 02/06/21  1327 02/06/21  0414 02/05/21  2104 02/05/21  0525 02/04/21  0458  02/02/21  1250   INR   --   --   --   --   --   --   --   --  1 22*   PTT seconds 68* 60* 66* 114* 51* 70* 73*   < > 31    < > = values in this interval not displayed        Results from last 7 days   Lab Units 02/01/21  1058   TROPONIN I ng/mL 0 03     Results from last 7 days   Lab Units 02/07/21  0332 02/02/21  0442   LACTIC ACID mmol/L 0 8 1 2     ABG:  Results from last 7 days   Lab Units 02/06/21  0430   PH ART  7 343*   PCO2 ART mm Hg 53 2*   PO2 ART mm Hg 76 3   HCO3 ART mmol/L 28 3*   BASE EXC ART mmol/L 1 8   ABG SOURCE  Line, Arterial     VBG:  Results from last 7 days   Lab Units 02/06/21  0430   ABG SOURCE  Line, Arterial     Results from last 7 days   Lab Units 02/06/21  0414 02/04/21  0459 02/03/21  0457 02/01/21  1058   PROCALCITONIN ng/ml 0 96* 0 38* 0 45* 0 13       Micro  Results from last 7 days   Lab Units 02/04/21  1703 02/04/21  1220 02/01/21  1208 02/01/21  1200   BLOOD CULTURE   --  No Growth at 48 hrs  No Growth at 48 hrs  No Growth After 4 Days  No Growth After 4 Days  SPUTUM CULTURE  Culture results to follow  --   --   --    GRAM STAIN RESULT  1+ Polys  No bacteria seen  --   --   --        EKG: None  Imaging:  I have personally reviewed pertinent reports  Intake and Output  I/O       02/05 0701 - 02/06 0700 02/06 0701 - 02/07 0700    P  O  0 0    I V  (mL/kg) 1465 5 (8 9) 1107 8 (6 7)    NG/ 490    IV Piggyback 410 250    Feedings 940 798    Total Intake(mL/kg) 3345 5 (20 3) 2645 8 (16)    Urine (mL/kg/hr) 3148 (0 8) 2460 (0 6)    Emesis/NG output 0 0    Other  0    Stool 0 0    Total Output 3148 2460    Net +197 5 +185 8          Unmeasured Stool Occurrence 2 x 1 x        UOP: -2460 ml  Net +185 mL  Since admit -12,244    Height and  Weights   Height: 5' 11" (180 3 cm)  IBW: 75 3 kg  Body mass index is 50 8 kg/m²  Weight (last 2 days)     Date/Time   Weight    02/06/21 0600   (!) 165 (364 2)    02/05/21 0535   (!) 166 (365 3)                Nutrition       Diet Orders   (From admission, onward)             Start     Ordered    02/06/21 1432  Diet Enteral/Parenteral; Tube Feeding No Oral Diet; Jevity 1 5; Continuous; 45; Prosource Protein Liquid - Two Packets  Diet effective now     Question Answer Comment   Diet Type Enteral/Parenteral    Enteral/Parenteral Tube Feeding No Oral Diet    Tube Feeding Formula: Jevity 1 5    Bolus/Cyclic/Continuous Continuous    Tube Feeding Goal Rate (mL/hr): 45    Prosource Protein Liquid - No Carb Prosource Protein Liquid - Two Packets    RD to adjust diet per protocol? Yes        02/06/21 1431    01/17/21 1719  Room Service  Once     Question:  Type of Service  Answer:  Room Service- Not Appropriate    01/17/21 1718              TF currently running at 45 ml/hr with a goal of 45 ml/hr   Formula: Jevity 1 5      Active Medications  Scheduled Meds:  Current Facility-Administered Medications   Medication Dose Route Frequency Provider Last Rate    acetaminophen  650 mg Per NG Tube Q4H PRN Jennifer Hinton PA-C      aspirin  81 mg Oral Daily KATHY Hdz      atorvastatin  40 mg Per NG Tube HS Jennifer Hinton PA-C      bisacodyl  10 mg Rectal Daily PRN Jennifer Hinton PA-C      cefepime  2,000 mg Intravenous Q12H Eligio Chavez PA-C Stopped (02/07/21 0056)    chlorhexidine  15 mL [facility] [place], [place]      cholecalciferol  2,000 Units Per NG Tube Daily Jennifer Hinton PA-C      dexamethasone  4 mg Intravenous Q12H [facility] KATHY Hdz      fentaNYL  75 mcg/hr Intravenous Continuous Bharti Duckworth PA-C      fentanyl citrate (PF)  50 mcg Intravenous Q1H PRN KATHY Hdz      furosemide  40 mg Intravenous BID (diuretic) Konstantin Hurtado MD      heparin (porcine)  3-30 Units/kg/hr (Order-Specific) Intravenous Titrated Rhesa Rhyme, DO 10 Units/kg/hr (02/06/21 1257)    heparin (porcine)  10,000 Units Intravenous Q1H PRN Rhesa Rhyme, DO      heparin (porcine)  5,000 Units Intravenous Q1H PRN Rhesa Rhyme, DO      insulin lispro  1-6 Units Subcutaneous Q6H [facility] KATHY Domingo      lactulose  10 g Oral Daily Konstantin Hurtado MD      levETIRAcetam  750 mg Oral Q12H [facility] KATHY Hdz      melatonin  6 mg Oral HS KATHY Hdz      multivitamin with iron-minerals  15 mL Per NG Tube Daily Jennifer Hinton PA-C      norepinephrine  1-30 mcg/min Intravenous Titrated KATHY Hdz 7 mcg/min (02/07/21 0400)    omeprazole (PRILOSEC) suspension 2 mg/mL  20 mg Oral Q12H KATHY Domingo      polyethylene glycol  17 g Oral BID Jennifer Hinton PA-C      propofol  5-50 mcg/kg/min Intravenous Titrated Jennifer Hinton PA-C 30 mcg/kg/min (02/07/21 0521)    senna-docusate sodium  1 tablet Per NG Tube BID KATHY Julian      sertraline  50 mg Oral HS [facility] KATHY Roa      sertraline  75 mg Oral Daily [address] [place]      vancomycin  12 5 mg/kg (Adjusted) Intravenous Daily PRN Sneha De La Cruz MD       Continuous Infusions:  fentaNYL, 75 mcg/hr  heparin (porcine), 3-30 Units/kg/hr (Order-Specific), Last Rate: 10 Units/kg/hr (02/06/21 1257)  norepinephrine, 1-30 mcg/min, Last Rate: 7 mcg/min (02/07/21 0400)  propofol, 5-50 mcg/kg/min, Last Rate: 30 mcg/kg/min (02/07/21 0521)      PRN Meds:   acetaminophen, 650 mg, Q4H PRN  bisacodyl, 10 mg, Daily PRN  fentanyl citrate (PF), 50 mcg, Q1H PRN  heparin (porcine), 10,000 Units, Q1H PRN  heparin (porcine), 5,000 Units, Q1H PRN  vancomycin, 12 5 mg/kg (Adjusted), Daily PRN        Allergies   Allergies   Allergen Reactions    Amiodarone      Developed Rash    Penicillins Rash     However, has subsequently tolerated Cefazolin and Cefepime     ---------------------------------------------------------------------------------------  Advance Directive and Living Will:      Power of :    POLST:    ---------------------------------------------------------------------------------------  Care Time Delivered:   Upon my evaluation, this patient had a high probability of imminent or life-threatening deterioration due to Increased oxygen demand, which required my direct attention, intervention, and personal management  I have personally provided 30 minutes (7 to 7:30) of critical care time, exclusive of procedures, teaching, family meetings, and any prior time recorded by providers other than myself  Sneha De La Cruz MD      Portions of the record may have been created with voice recognition software  Occasional wrong word or "sound a like" substitutions may have occurred due to the inherent limitations of voice recognition software    Read the chart carefully and recognize, using context, where substitutions have occurred

## 2021-02-07 NOTE — QUICK NOTE
Updated spouse Nancy Crystal about patient's status and plan for today including adding midodrine, changing abx to ertapenam, and continuing mostly same vent settings  Also about placing PICC line in  Answered questions to the best of my ability  Spouse appreciative of update  Spouse states wanting immediate updates if any acute issues/changes go on

## 2021-02-08 LAB
ANION GAP SERPL CALCULATED.3IONS-SCNC: 5 MMOL/L (ref 4–13)
ANISOCYTOSIS BLD QL SMEAR: PRESENT
APTT PPP: 52 SECONDS (ref 23–37)
APTT PPP: 68 SECONDS (ref 23–37)
APTT PPP: 94 SECONDS (ref 23–37)
BASE EXCESS BLDA CALC-SCNC: 4.8 MMOL/L
BASOPHILS # BLD MANUAL: 0 THOUSAND/UL (ref 0–0.1)
BASOPHILS NFR MAR MANUAL: 0 % (ref 0–1)
BUN SERPL-MCNC: 60 MG/DL (ref 5–25)
CA-I BLD-SCNC: 1.12 MMOL/L (ref 1.12–1.32)
CALCIUM SERPL-MCNC: 8.1 MG/DL (ref 8.3–10.1)
CHLORIDE SERPL-SCNC: 106 MMOL/L (ref 100–108)
CO2 SERPL-SCNC: 33 MMOL/L (ref 21–32)
CREAT SERPL-MCNC: 1.53 MG/DL (ref 0.6–1.3)
EOSINOPHIL # BLD MANUAL: 0.52 THOUSAND/UL (ref 0–0.4)
EOSINOPHIL NFR BLD MANUAL: 4 % (ref 0–6)
ERYTHROCYTE [DISTWIDTH] IN BLOOD BY AUTOMATED COUNT: 21.7 % (ref 11.6–15.1)
GFR SERPL CREATININE-BSD FRML MDRD: 48 ML/MIN/1.73SQ M
GLUCOSE SERPL-MCNC: 113 MG/DL (ref 65–140)
GLUCOSE SERPL-MCNC: 117 MG/DL (ref 65–140)
GLUCOSE SERPL-MCNC: 123 MG/DL (ref 65–140)
GLUCOSE SERPL-MCNC: 125 MG/DL (ref 65–140)
GLUCOSE SERPL-MCNC: 137 MG/DL (ref 65–140)
HCO3 BLDA-SCNC: 32.7 MMOL/L (ref 22–28)
HCT VFR BLD AUTO: 35.6 % (ref 36.5–49.3)
HGB BLD-MCNC: 10.1 G/DL (ref 12–17)
HOROWITZ INDEX BLDA+IHG-RTO: 50 MM[HG]
HYPERCHROMIA BLD QL SMEAR: PRESENT
LYMPHOCYTES # BLD AUTO: 0.52 THOUSAND/UL (ref 0.6–4.47)
LYMPHOCYTES # BLD AUTO: 4 % (ref 14–44)
MAGNESIUM SERPL-MCNC: 2.4 MG/DL (ref 1.6–2.6)
MCH RBC QN AUTO: 23.4 PG (ref 26.8–34.3)
MCHC RBC AUTO-ENTMCNC: 28.4 G/DL (ref 31.4–37.4)
MCV RBC AUTO: 82 FL (ref 82–98)
METAMYELOCYTES NFR BLD MANUAL: 1 % (ref 0–1)
MONOCYTES # BLD AUTO: 0.91 THOUSAND/UL (ref 0–1.22)
MONOCYTES NFR BLD: 7 % (ref 4–12)
MYELOCYTES NFR BLD MANUAL: 1 % (ref 0–1)
NEUTROPHILS # BLD MANUAL: 10.73 THOUSAND/UL (ref 1.85–7.62)
NEUTS BAND NFR BLD MANUAL: 2 % (ref 0–8)
NEUTS SEG NFR BLD AUTO: 81 % (ref 43–75)
NRBC BLD AUTO-RTO: 0 /100 WBCS
NRBC BLD AUTO-RTO: 2 /100 WBC (ref 0–2)
O2 CT BLDA-SCNC: 14.5 ML/DL (ref 16–23)
OXYHGB MFR BLDA: 91.8 % (ref 94–97)
PCO2 BLDA: 66.8 MM HG (ref 36–44)
PEEP RESPIRATORY: 12 CM[H2O]
PH BLDA: 7.31 [PH] (ref 7.35–7.45)
PHOSPHATE SERPL-MCNC: 4.6 MG/DL (ref 2.3–4.1)
PLATELET # BLD AUTO: 234 THOUSANDS/UL (ref 149–390)
PLATELET BLD QL SMEAR: ADEQUATE
PMV BLD AUTO: 12.7 FL (ref 8.9–12.7)
PO2 BLDA: 72.7 MM HG (ref 75–129)
POTASSIUM SERPL-SCNC: 4.2 MMOL/L (ref 3.5–5.3)
RBC # BLD AUTO: 4.32 MILLION/UL (ref 3.88–5.62)
SODIUM SERPL-SCNC: 144 MMOL/L (ref 136–145)
SPECIMEN SOURCE: ABNORMAL
TOTAL CELLS COUNTED SPEC: 100
VENT AC: 18
VENT- AC: AC
VT SETTING VENT: 450 ML
WBC # BLD AUTO: 12.93 THOUSAND/UL (ref 4.31–10.16)

## 2021-02-08 PROCEDURE — 85730 THROMBOPLASTIN TIME PARTIAL: CPT | Performed by: NURSE PRACTITIONER

## 2021-02-08 PROCEDURE — 94760 N-INVAS EAR/PLS OXIMETRY 1: CPT

## 2021-02-08 PROCEDURE — 99291 CRITICAL CARE FIRST HOUR: CPT | Performed by: NURSE PRACTITIONER

## 2021-02-08 PROCEDURE — 82805 BLOOD GASES W/O2 SATURATION: CPT | Performed by: PHYSICIAN ASSISTANT

## 2021-02-08 PROCEDURE — 85730 THROMBOPLASTIN TIME PARTIAL: CPT | Performed by: PHYSICIAN ASSISTANT

## 2021-02-08 PROCEDURE — 99232 SBSQ HOSP IP/OBS MODERATE 35: CPT | Performed by: NURSE PRACTITIONER

## 2021-02-08 PROCEDURE — 94150 VITAL CAPACITY TEST: CPT

## 2021-02-08 PROCEDURE — 85007 BL SMEAR W/DIFF WBC COUNT: CPT | Performed by: PHYSICIAN ASSISTANT

## 2021-02-08 PROCEDURE — 82330 ASSAY OF CALCIUM: CPT | Performed by: PHYSICIAN ASSISTANT

## 2021-02-08 PROCEDURE — 94003 VENT MGMT INPAT SUBQ DAY: CPT

## 2021-02-08 PROCEDURE — 85027 COMPLETE CBC AUTOMATED: CPT | Performed by: PHYSICIAN ASSISTANT

## 2021-02-08 PROCEDURE — 82948 REAGENT STRIP/BLOOD GLUCOSE: CPT

## 2021-02-08 PROCEDURE — 80048 BASIC METABOLIC PNL TOTAL CA: CPT | Performed by: PHYSICIAN ASSISTANT

## 2021-02-08 PROCEDURE — 99232 SBSQ HOSP IP/OBS MODERATE 35: CPT | Performed by: INTERNAL MEDICINE

## 2021-02-08 PROCEDURE — 84100 ASSAY OF PHOSPHORUS: CPT | Performed by: PHYSICIAN ASSISTANT

## 2021-02-08 PROCEDURE — 83735 ASSAY OF MAGNESIUM: CPT | Performed by: PHYSICIAN ASSISTANT

## 2021-02-08 PROCEDURE — 99292 CRITICAL CARE ADDL 30 MIN: CPT | Performed by: INTERNAL MEDICINE

## 2021-02-08 RX ORDER — MIDODRINE HYDROCHLORIDE 5 MG/1
5 TABLET ORAL EVERY 8 HOURS
Status: DISCONTINUED | OUTPATIENT
Start: 2021-02-08 | End: 2021-02-12

## 2021-02-08 RX ORDER — DEXAMETHASONE SODIUM PHOSPHATE 4 MG/ML
4 INJECTION, SOLUTION INTRA-ARTICULAR; INTRALESIONAL; INTRAMUSCULAR; INTRAVENOUS; SOFT TISSUE DAILY
Status: DISCONTINUED | OUTPATIENT
Start: 2021-02-09 | End: 2021-02-13

## 2021-02-08 RX ADMIN — ASPIRIN 81 MG CHEWABLE TABLET 81 MG: 81 TABLET CHEWABLE at 08:36

## 2021-02-08 RX ADMIN — LACTULOSE 10 G: 10 SOLUTION ORAL at 08:36

## 2021-02-08 RX ADMIN — CHLORHEXIDINE GLUCONATE 0.12% ORAL RINSE 15 ML: 1.2 LIQUID ORAL at 08:35

## 2021-02-08 RX ADMIN — MIDODRINE HYDROCHLORIDE 5 MG: 5 TABLET ORAL at 06:50

## 2021-02-08 RX ADMIN — Medication 50 MCG/HR: at 19:37

## 2021-02-08 RX ADMIN — CHLORHEXIDINE GLUCONATE 0.12% ORAL RINSE 15 ML: 1.2 LIQUID ORAL at 20:29

## 2021-02-08 RX ADMIN — LEVETIRACETAM 750 MG: 100 SOLUTION ORAL at 20:29

## 2021-02-08 RX ADMIN — DEXAMETHASONE SODIUM PHOSPHATE 4 MG: 4 INJECTION INTRA-ARTICULAR; INTRALESIONAL; INTRAMUSCULAR; INTRAVENOUS; SOFT TISSUE at 08:36

## 2021-02-08 RX ADMIN — ERTAPENEM SODIUM 1000 MG: 1 INJECTION, POWDER, LYOPHILIZED, FOR SOLUTION INTRAMUSCULAR; INTRAVENOUS at 08:37

## 2021-02-08 RX ADMIN — POLYETHYLENE GLYCOL 3350 17 G: 17 POWDER, FOR SOLUTION ORAL at 08:34

## 2021-02-08 RX ADMIN — SERTRALINE 75 MG: 25 TABLET, FILM COATED ORAL at 08:35

## 2021-02-08 RX ADMIN — Medication 5 MCG/MIN: at 01:25

## 2021-02-08 RX ADMIN — Medication 2000 UNITS: at 08:35

## 2021-02-08 RX ADMIN — ACETAMINOPHEN 650 MG: 650 SUSPENSION ORAL at 17:34

## 2021-02-08 RX ADMIN — Medication 6 MG: at 22:27

## 2021-02-08 RX ADMIN — POLYETHYLENE GLYCOL 3350 17 G: 17 POWDER, FOR SOLUTION ORAL at 20:30

## 2021-02-08 RX ADMIN — HEPARIN SODIUM 5000 UNITS: 1000 INJECTION INTRAVENOUS; SUBCUTANEOUS at 08:35

## 2021-02-08 RX ADMIN — FENTANYL CITRATE 50 MCG: 50 INJECTION INTRAMUSCULAR; INTRAVENOUS at 20:30

## 2021-02-08 RX ADMIN — MIDODRINE HYDROCHLORIDE 5 MG: 5 TABLET ORAL at 14:06

## 2021-02-08 RX ADMIN — DOCUSATE SODIUM AND SENNOSIDES 1 TABLET: 8.6; 5 TABLET, FILM COATED ORAL at 17:34

## 2021-02-08 RX ADMIN — FUROSEMIDE 40 MG: 10 INJECTION, SOLUTION INTRAVENOUS at 08:36

## 2021-02-08 RX ADMIN — Medication 20 MG: at 14:06

## 2021-02-08 RX ADMIN — MIDODRINE HYDROCHLORIDE 5 MG: 5 TABLET ORAL at 22:27

## 2021-02-08 RX ADMIN — MULTIVITAMIN 15 ML: LIQUID ORAL at 12:09

## 2021-02-08 RX ADMIN — LEVETIRACETAM 750 MG: 100 SOLUTION ORAL at 08:36

## 2021-02-08 RX ADMIN — DESMOPRESSIN ACETATE 40 MG: 0.2 TABLET ORAL at 22:28

## 2021-02-08 RX ADMIN — SERTRALINE HYDROCHLORIDE 50 MG: 50 TABLET ORAL at 22:27

## 2021-02-08 RX ADMIN — DOCUSATE SODIUM AND SENNOSIDES 1 TABLET: 8.6; 5 TABLET, FILM COATED ORAL at 08:35

## 2021-02-08 NOTE — UTILIZATION REVIEW
Continued Stay Review    Date: 2/6/21                         Current Patient Class: Inpatient Current Level of Care: Critical Care    HPI:60 y o  male initially admitted on 1/17/2021 with acute hypoxic and hypercapnic respiratory failure requiring intubation and mechanical ventilation secondary to COVID-19 pneumonia  Patient with seizure like activity on 2/1, required re-intubation          2/6/21 Critical Care:  Ventilator Day 6, current settings AC 18/450/60/14  Wean FiO2 as able today  Continue broad spectrum antibiotics for possible superimposed bacterial pneumonia  Continue IV dexamethasone  Levophed 4 mcg/min, titrate for MAP >65  Remain on IV Keppra  Physical exam: + 4 B/L LE edema, decreased breath sounds  Nephrology: continue IV Lasix 40 mg BID  Pertinent Labs/Diagnostic Results:     2/5 CXR: No change in severe bilateral consolidation, greater on the left, related to Covid 19 pneumonia        Results from last 7 days   Lab Units 02/06/21 0414 02/05/21  0525 02/04/21  0459 02/03/21  0504 02/02/21  0442   WBC Thousand/uL 15 08* 21 84* 24 84* 15 39* 10 68*   HEMOGLOBIN g/dL 9 7* 10 3* 11 8* 12 0 10 6*   I STAT HEMOGLOBIN   --   --   --   --   --    HEMATOCRIT % 32 8* 34 7* 38 9 41 6 36 3*   HEMATOCRIT, ISTAT   --   --   --   --   --    PLATELETS Thousands/uL 181 196 232 224 204   NEUTROS ABS Thousands/µL 14 05*  --  22 59*  --  9 74*   BANDS PCT %  --   --   --  2  --          Results from last 7 days   Lab Units 02/06/21  0414 02/05/21  0525 02/04/21  0459 02/03/21  0457   SODIUM mmol/L 143 143 144 144   POTASSIUM mmol/L 3 5 3 5 3 7 4 0   CHLORIDE mmol/L 105 106 107 104   CO2 mmol/L 32 32 30 35*   ANION GAP mmol/L 6 5 7 5   BUN mg/dL 52* 54* 59* 60*   CREATININE mg/dL 1 50* 1 68* 1 86* 1 86*   EGFR ml/min/1 73sq m 50 43 38 38   CALCIUM mg/dL 7 9* 7 7* 8 1* 8 4   CALCIUM, IONIZED mmol/L  --   --   --   --    MAGNESIUM mg/dL 1 9  --  1 6 1 6   PHOSPHORUS mg/dL 4 5*  --  3 1 2 7     Results from last 7 days   Lab Units 02/05/21  0525 02/04/21  0459 02/03/21  0457 02/02/21  0442   AST U/L 19 13 16 15   ALT U/L 19 16 20 22   ALK PHOS U/L 67 63 64 56   TOTAL PROTEIN g/dL 5 3* 5 5* 6 2* 5 6*   ALBUMIN g/dL 2 3* 2 8* 3 1* 2 8*   TOTAL BILIRUBIN mg/dL 0 54 0 60 0 58 0 51     Results from last 7 days   Lab Units 02/06/21  1838 02/06/21  1231 02/06/21  0638 02/06/21  0033 02/05/21  1820 02/05/21  1143   POC GLUCOSE mg/dl 126 132 155* 144* 193* 153*     Results from last 7 days   Lab Units 02/06/21  0414 02/05/21  0525 02/04/21  0459 02/03/21  0457 02/02/21  0442   GLUCOSE RANDOM mg/dL 176* 147* 144* 101 135               Results from last 7 days   Lab Units 02/06/21  0430   PH ART  7 343*   PCO2 ART mm Hg 53 2*   PO2 ART mm Hg 76 3   HCO3 ART mmol/L 28 3*   BASE EXC ART mmol/L 1 8   O2 CONTENT ART mL/dL 13 7*   O2 HGB, ARTERIAL % 92 5*   ABG SOURCE  Line, Arterial         Results from last 7 days   Lab Units 02/02/21  0955 02/01/21 2059   I STAT BASE EXC mmol/L 9* 7*   I STAT O2 SAT % 36* 87*   ISTAT PH ART  7 396 7 364   I STAT ART PCO2 mm HG 57 7* 60 8*   I STAT ART PO2 mm HG 22 0* 57 0*   I STAT ART HCO3 mmol/L 35 4* 34 6*             Results from last 7 days   Lab Units 02/06/21  0414 02/04/21  0458 02/03/21  0719   D-DIMER QUANTITATIVE ug/ml FEU 1 47* 3 41* 7 15*     Results from last 7 days   Lab Units 02/06/21  1911 02/02/21  1250   PROTIME seconds  --  15 5*   INR   --  1 22*   PTT seconds 60* 31         Results from last 7 days   Lab Units 02/06/21  0414 02/04/21  0459 02/03/21  0457   PROCALCITONIN ng/ml 0 96* 0 38* 0 45*     Results from last 7 days   Lab Units 02/02/21  0442   LACTIC ACID mmol/L 1 2             Results from last 7 days   Lab Units 02/06/21  0414 02/04/21  0459   NT-PRO BNP pg/mL 1,470* 784*     Results from last 7 days   Lab Units 02/06/21  0414 02/04/21  0459 02/03/21  0457   FERRITIN ng/mL 170 233 242             Results from last 7 days   Lab Units 02/06/21  0414 02/04/21  0459 02/03/21  0457   CRP mg/L 66 7* 42 1* 11 5*         Results from last 7 days   Lab Units 02/01/21  1151   CLARITY UA  Clear   COLOR UA  Yellow   SPEC GRAV UA  1 010   PH UA  5 5   GLUCOSE UA mg/dl Negative   KETONES UA mg/dl Negative   BLOOD UA  Trace-Intact*   PROTEIN UA mg/dl Negative   NITRITE UA  Negative   BILIRUBIN UA  Negative   UROBILINOGEN UA E U /dl 0 2   LEUKOCYTES UA  Negative   WBC UA /hpf None Seen   RBC UA /hpf 2-4   BACTERIA UA /hpf Occasional   EPITHELIAL CELLS WET PREP /hpf None Seen                                 Results from last 7 days   Lab Units 02/04/21  1703 02/04/21  1220 02/01/21  1208 02/01/21  1200   BLOOD CULTURE   --  No Growth at 72 hrs  No Growth at 72 hrs  No Growth After 5 Days  No Growth After 5 Days     SPUTUM CULTURE  Few Colonies of Escherichia coli ESBL*  Few Colonies of   --   --   --    GRAM STAIN RESULT  1+ Polys  No bacteria seen  --   --   --          Vital Signs:     Date/Time    Temp            Pulse         RR              BP          MPA            SpO2       %FIO2      O2 Device                     02/06/21 2200  96 6 °F (35 9 °C)Abnormal   77  19   100/47  62 mmHg  93 %  -- --   02/06/21 2100  96 6 °F (35 9 °C)Abnormal   78  24Abnormal    116/53  71 mmHg  90 %  -- --   02/06/21 2000  96 8 °F (36 °C)Abnormal   79  24Abnormal    113/52  68 mmHg  94 %  50 Ventilator   02/06/21 1900  96 8 °F (36 °C)Abnormal   81  28Abnormal    112/51  68 mmHg  96 %  -- --   02/06/21 1800  97 °F (36 1 °C)Abnormal   79  23Abnormal    112/48  65 mmHg  94 %  -- --   02/06/21 1700  97 °F (36 1 °C)Abnormal   78  25Abnormal    103/48  63 mmHg  93 %  -- --   02/06/21 1600  97 °F (36 1 °C)Abnormal   78  25Abnormal    119/50  69 mmHg  94 %  -- --   02/06/21 1500  97 2 °F (36 2 °C)Abnormal   85  22   104/42  62 mmHg  98 %  -- --   02/06/21 1400  97 °F (36 1 °C)Abnormal   79  24Abnormal    97/50  67 mmHg  97 %  -- --   02/06/21 1300  97 °F (36 1 °C)Abnormal   79  43Abnormal    116/49  68 mmHg 94 %  -- --   02/06/21 1200  96 8 °F (36 °C)Abnormal   78  23Abnormal    131/53  73 mmHg  91 %  -- --   02/06/21 1145  96 8 °F (36 °C)Abnormal   76  47Abnormal    126/49  68 mmHg  91 %  -- --   02/06/21 1100  96 8 °F (36 °C)Abnormal   75  22   126/53  71 mmHg  91 %  -- --   02/06/21 1000  96 8 °F (36 °C)Abnormal   79  23Abnormal    126/48  68 mmHg  95 %  -- --   02/06/21 0900  97 °F (36 1 °C)Abnormal   98  25Abnormal    121/50  77 mmHg  80 %Abnormal   -- --   02/06/21 0800  96 8 °F (36 °C)Abnormal   78  30Abnormal    124/54  73 mmHg  90 %  -- --   02/06/21 0700  97 °F (36 1 °C)Abnormal   78  28Abnormal    127/54  74 mmHg  97 %  -- --   02/06/21 0600  97 2 °F (36 2 °C)Abnormal   76  42Abnormal    125/54  74 mmHg  96 %  -- --   02/06/21 0500  97 3 °F (36 3 °C)Abnormal   79  41Abnormal    138/61  85 mmHg  97 %  -- --   02/06/21 0400  97 3 °F (36 3 °C)Abnormal   79  45Abnormal    117/51  71 mmHg  97 %  70 Ventilator   02/06/21 0300  97 5 °F (36 4 °C)  84  37Abnormal    135/58  82 mmHg  96 %  -- --   02/06/21 0200  97 7 °F (36 5 °C)  83  41Abnormal    125/58  78 mmHg  96 %  -- --   02/06/21 0100  97 9 °F (36 6 °C)  86  37Abnormal    127/59  80 mmHg  96 %  -- --   02/06/21 0000  98 1 °F (36 7 °C)  86  44Abnormal    126/54  76 mmHg  96 %  70 Ventilator          Active Medications  Scheduled Meds:         Medication Dose Route Frequency    acetaminophen  650 mg Per NG Tube Q4H PRN    atorvastatin  40 mg Per NG Tube HS    bisacodyl  10 mg Rectal Daily PRN    cefepime  2,000 mg Intravenous Q12H    chlorhexidine  15 mL Swish & Spit Q12H [address]    cholecalciferol  2,000 Units Per NG Tube Daily    dexamethasone  6 mg Intravenous Q12H [address]    famotidine  20 mg Per NG Tube BID    fentanyl citrate (PF)  50 mcg Intravenous Q2H PRN    furosemide  40 mg Intravenous BID (diuretic)    heparin (porcine)  3-30 Units/kg/hr (Order-Specific) Intravenous Titrated    heparin (porcine)  10,000 Units Intravenous Q1H PRN    heparin (porcine)  5,000 Units Intravenous Q1H PRN    lactulose  10 g Oral Daily    levETIRAcetam  750 mg Intravenous Q12H [facility]    multivitamin with iron-minerals  15 mL Per NG Tube Daily    norepinephrine  1-30 mcg/min Intravenous Titrated    polyethylene glycol  17 g Oral BID    propofol  5-50 mcg/kg/min Intravenous Titrated    senna-docusate sodium  1 tablet Per NG Tube HS    vancomycin  12 5 mg/kg (Adjusted) Intravenous Daily PRN      Continuous Infusions:  heparin (porcine), 3-30 Units/kg/hr (Order-Specific), Last Rate: 10 Units/kg/hr (02/06/21 1257)  norepinephrine, 1-30 mcg/min, Last Rate: 1 mcg/min (02/06/21 1305)  propofol, 5-50 mcg/kg/min, Last Rate: 20 mcg/kg/min (02/06/21 1257)        PRN Meds:   acetaminophen, 650 mg, Q4H PRN  bisacodyl, 10 mg, Daily PRN  fentanyl citrate (PF), 50 mcg, Q2H PRN  heparin (porcine), 10,000 Units, Q1H PRN  heparin (porcine), 5,000 Units, Q1H PRN  vancomycin, 12 5 mg/kg (Adjusted), Daily PRN          Discharge Plan: D        Network Utilization Review Department  ATTENTION: Please call with any questions or concerns to [phone] and carefully listen to the prompts so that you are directed to the right person  All voicemails are confidential   Radha Tomas all requests for admission clinical reviews, approved or denied determinations and any other requests to dedicated fax number below belonging to the campus where the patient is receiving treatment   List of dedicated fax numbers for the Facilities:  [address] DENIALS (Administrative/Medical Necessity) [phone]   [address] [place] [address] (Maternity/NICU/Pediatrics) 261 [facility],[address] Wally Sykes 2775 ([address]i [address] "[address]ila[address]) [address] [facility] Edinson Mcgee 28 Radha Mario Gallagher 1481 [address] [place]) [address] [place] [address] 951 [phone]

## 2021-02-08 NOTE — PROGRESS NOTES
Progress Note - Palliative & Supportive Care  Lizet Friedman  63 y o   male  ICU 08/ICU 08   MRN: [id number]  Encounter: [id number]     Assessment  Patient Active Problem List   Diagnosis    Aortic stenosis, mild    Essential hypertension    Hyperlipidemia    Left bundle branch block    Murmur    Osteoarthritis of both knees    Pericardial disease    Sciatica    Age-related cataract of right eye    Venous insufficiency    Bilateral leg edema    Stress-induced cardiomyopathy    History of aortic valve replacement with bioprosthetic valve    Persistent atrial fibrillation (HCC)    MSSA bacteremia - Resolved septic shock    Acute blood loss anemia - Gastric ulcer    Leukocytosis    Acute metabolic encephalopathy    Skin rash    Acute renal failure    Coagulopathy (HCC)    GI bleeding    Age-related nuclear cataract, bilateral    Open angle with borderline findings, low risk, bilateral    Presence of intraocular lens    Vitreous degeneration of both eyes    Left arm swelling    Dysphagia    Cellulitis/myositis of left forearm    Left internal jugular vein thrombus    Morbid obesity     Depression    Abnormal CT of the head    Gastric ulcer    Atrial fibrillation (HCC)    Pneumonia due to COVID-19 virus    Acute respiratory failure with hypoxia and hypercarbia (HCC)    Bacteremia    Hematuria    Acute kidney injury superimposed on CKD (HCC)    Anemia    Hypernatremia    Seizure (HCC)    History of stroke    Positive D dimer in a COVID postitive patient      Goals of Care  Level 1 code status  Disease focused care  Will continue discussions regarding [address] as patient's clinical presentation evolves  Spouse does not appear to understand the gravity of her  situation  She would be benefit from clear concise medical information but in the most simple terms possible    Offered to include other family member in the conversation however wife declines this service  Plan  Symptom Management  Will defer symptom management to the ICU team at this time  24 History  Chart reviewed before visit  Remains intubated and sedated  Unable to voice complaints    Spoke with wife for an 45 minutes  She has not seen patient since March  She wants Lillie Spare back at [facility] "at any cost "  He has lived there for 3 years  She says "you are all doing the right thing and keeping him going and getting him better every day  I would never change my mind about that " She spends the majority of the conversation lamenting over their past and happy marriage  She can't imagine a life with out him "no matter what "  She says she doesn't want anyone to give up on Miki because "he deserve to live "   Brother in law is an under taker and she pleads with me not to make her call him  Discussed quality of life including interventions like a trach and PEG and she becomes upset and say that [facility] not [facility] these things  She appears to struggle to understand then medical information being provided and focuses on thing like the fact that "he could move his arm the other day "      Review of Systems: Review of systems not obtained due to patient factors      Medications    Current Facility-Administered Medications:     acetaminophen (TYLENOL) oral suspension 650 mg, 650 mg, Per NG Tube, Q4H PRN, Amari Livingston PA-C    aspirin chewable tablet 81 mg, 81 mg, Oral, Daily, Lisa Nye, KATHY, 81 mg at 02/08/21 0836    atorvastatin (LIPITOR) tablet 40 mg, 40 mg, Per NG Tube, HS, Amari Livingston PA-C, 40 mg at 02/07/21 2107    bisacodyl (DULCOLAX) rectal suppository 10 mg, 10 mg, Rectal, Daily PRN, JIM EsparzaC    chlorhexidine (PERIDEX) 0 12 % oral rinse 15 mL, 15 mL, Swish & Spit, Q12H [address], Amari Livingston PA-C, 15 mL at 02/08/21 0835    cholecalciferol (VITAMIN D3) tablet 2,000 Units, 2,000 Units, Per NG Tube, Daily, Amari Livingston PA-C, 2,000 Units at 02/08/21 0835    dexamethasone (DECADRON) injection 4 mg, 4 mg, Intravenous, Q12H [facility], KATHY Domingo, 4 mg at 02/08/21 0836    ertapenem (INVanz) 1,000 mg in sodium chloride 0 9 % 50 mL IVPB, 1,000 mg, Intravenous, Q24H, Andrey Huddleston PA-C, Last Rate: 100 mL/hr at 02/08/21 0837, 1,000 mg at 02/08/21 0837    fentaNYL 1000 mcg in sodium chloride 0 9% 100mL infusion, 75 mcg/hr, Intravenous, Continuous, Jamarcus Morillo PA-C, Last Rate: 7 5 mL/hr at 02/07/21 1515, 75 mcg/hr at 02/07/21 1515    fentanyl citrate (PF) 100 MCG/2ML 50 mcg, 50 mcg, Intravenous, Q1H PRN, KATHY Carbajal, 50 mcg at 02/07/21 2129    furosemide (LASIX) injection 40 mg, 40 mg, Intravenous, BID (diuretic), Camilo Beckman MD, 40 mg at 02/08/21 0836    heparin (porcine) 25,000 units in 0 45% NaCl 250 mL infusion (premix), 3-30 Units/kg/hr (Order-Specific), Intravenous, Titrated, Margaretann Spoon, DO, Last Rate: 15 mL/hr at 02/08/21 0716, 12 Units/kg/hr at 02/08/21 0716    heparin (porcine) injection 10,000 Units, 10,000 Units, Intravenous, Q1H PRN, Margaretann Spoon, DO    heparin (porcine) injection 5,000 Units, 5,000 Units, Intravenous, Q1H PRN, Margaretann Spoon, DO, 5,000 Units at 02/08/21 0835    insulin lispro (HumaLOG) 100 units/mL subcutaneous injection 1-6 Units, 1-6 Units, Subcutaneous, Q6H [facility], 1 Units at 02/07/21 0608 **AND** Fingerstick Glucose (POCT), , , Q6H, KATHY Domingo    lactulose oral solution 10 g, 10 g, Oral, Daily, Camilo Beckman MD, 10 g at 02/08/21 0836    levETIRAcetam (KEPPRA) oral solution 750 mg, 750 mg, Oral, Q12H [facility], KATHY Domingo, 750 mg at 02/08/21 0836    melatonin tablet 6 mg, 6 mg, Oral, HS, KATHY Domingo, 6 mg at 02/07/21 2108    midodrine (PROAMATINE) tablet 5 mg, 5 mg, Oral, TID Aixa Bianchi PA-C, 5 mg at 02/08/21 0650    [COMPLETED] zinc sulfate (ZINCATE) capsule 220 mg, 220 mg, Per NG Tube, Daily, 220 mg at 01/24/21 0824 **FOLLOWED BY** multivitamin with iron-minerals liquid 15 mL, 15 mL, Per NG Tube, Daily, Kanu Eugene PA-C, 15 mL at 02/07/21 0857    NOREPINEPHRINE 4 MG  ML NSS (CMPD ORDER) infusion, 1-30 mcg/min, Intravenous, Titrated, [facility], CRNP, Last Rate: 7 5 mL/hr at 02/08/21 0735, 2 mcg/min at 02/08/21 0735    omeprazole (PRILOSEC) suspension 2 mg/mL, 20 mg, Oral, Q12H, KATHY Domingo, 20 mg at 02/07/21 1502    polyethylene glycol (MIRALAX) packet 17 g, 17 g, Oral, BID, Kanu Eugene PA-C, 17 g at 02/08/21 0834    propofol (DIPRIVAN) 1000 mg in 100 mL infusion (premix), 5-50 mcg/kg/min, Intravenous, Titrated, Kanu Eugene PA-C, Stopped at 02/07/21 0620    senna-docusate sodium (SENOKOT S) 8 6-50 mg per tablet 1 tablet, 1 tablet, Per NG Tube, BID, [facility], CRNP, 1 tablet at 02/08/21 0835    sertraline (ZOLOFT) tablet 50 mg, 50 mg, Oral, HS, KENIA DomingoNP, 50 mg at 02/07/21 2107    sertraline (ZOLOFT) tablet 75 mg, 75 mg, Oral, Daily, [facility], CRNP, 75 mg at 02/08/21 0835    Objective  /55   Pulse 92   Temp (!) 97 °F (36 1 °C)   Resp 19   Ht 5' 11" (1 803 m)   Wt (!) 164 kg (361 lb 15 9 oz)   SpO2 94%   BMI 50 49 kg/m²   Physical Exam:   Constitutional: Obese  In no acute distress  Head: Normocephalic and atraumatic  Eyes: closed  Neck: no visible adenopathy or masses  Respiratory: non labored breathing on vent  Gastrointestinal: No abdominal distension  Musculoskeletal: edema  Neurological: sedated  Skin: Dry, no diaphoresis  Psychiatric: Unable to assess due to sedation    Lab Results:   I have personally reviewed pertinent labs  , CBC:   Lab Results   Component Value Date    WBC 12 93 (H) 02/08/2021    HGB 10 1 (L) 02/08/2021    HCT 35 6 (L) 02/08/2021    MCV 82 02/08/2021     02/08/2021    MCH 23 4 (L) 02/08/2021    MCHC 28 4 (L) 02/08/2021    RDW 21 7 (H) 02/08/2021    MPV 12 7 02/08/2021    NRBC 0 02/08/2021    NRBC 2 02/08/2021   , CMP:   Lab Results   Component Value Date    SODIUM 144 02/08/2021    K 4 2 02/08/2021     02/08/2021    CO2 33 (H) 02/08/2021    BUN 60 (H) 02/08/2021    CREATININE 1 53 (H) 02/08/2021    CALCIUM 8 1 (L) 02/08/2021    EGFR 48 02/08/2021     Counseling / Coordination of Care  Total floor / unit time spent today 45 minutes  Greater than 50% of total time was spent with the patient and / or family counseling and / or coordinating of care  A description of the counseling / coordination of care:  provided medical updates to spouse, discussed palliative care, determined goals of care, determined POA, determined social/family support, discussed plans of care, discussed symptom management, provided psychosocial support       Preeti Leo, [address] [facility]

## 2021-02-08 NOTE — PLAN OF CARE
Problem: Prexisting or High Potential for Compromised Skin Integrity  Goal: Skin integrity is maintained or improved  Description: INTERVENTIONS:  - Identify patients at risk for skin breakdown  - Assess and monitor skin integrity  - Assess and monitor nutrition and hydration status  - Monitor labs   - Assess for incontinence   - Turn and reposition patient  - Assist with mobility/ambulation  - Relieve pressure over bony prominences  - Avoid friction and shearing  - Provide appropriate hygiene as needed including keeping skin clean and dry  - Evaluate need for skin moisturizer/barrier cream  - Collaborate with interdisciplinary team   - Patient/family teaching  - Consider wound care consult   2/7/2021 2128 by Dinh Araiza RN  Outcome: Progressing  2/7/2021 0805 by Dinh Araiza RN  Outcome: Not Progressing     Problem: Potential for Falls  Goal: Patient will remain free of falls  Description: INTERVENTIONS:  - Assess patient frequently for physical needs  -  Identify cognitive and physical deficits and behaviors that affect risk of falls  -  [place] fall precautions as indicated by assessment   - Educate patient/family on patient safety including physical limitations  - Instruct patient to call for assistance with activity based on assessment  - Modify environment to reduce risk of injury  - Consider OT/PT consult to assist with strengthening/mobility  2/7/2021 2128 by Dinh Araiza RN  Outcome: Progressing  2/7/2021 0805 by Dinh Araiza RN  Outcome: Not Progressing     Problem: Nutrition/Hydration-ADULT  Goal: Nutrient/Hydration intake appropriate for improving, restoring or maintaining nutritional needs  Description: Monitor and assess patient's nutrition/hydration status for malnutrition  Collaborate with interdisciplinary team and initiate plan and interventions as ordered  Monitor patient's weight and dietary intake as ordered or per policy   Utilize nutrition screening tool and intervene as necessary  Determine patient's food preferences and provide high-protein, high-caloric foods as appropriate  INTERVENTIONS:  - Monitor oral intake, urinary output, labs, and treatment plans  - Assess nutrition and hydration status and recommend course of action  - Evaluate amount of meals eaten  - Assist patient with eating if necessary   - Allow adequate time for meals  - Recommend/ encourage appropriate diets, oral nutritional supplements, and vitamin/mineral supplements  - Order, calculate, and assess calorie counts as needed  - Recommend, monitor, and adjust tube feedings and TPN/PPN based on assessed needs  - Assess need for intravenous fluids  - Provide specific nutrition/hydration education as appropriate  - Include patient/family/caregiver in decisions related to nutrition  2/7/2021 2128 by Jomar Amador RN  Outcome: Progressing  2/7/2021 0805 by Jomar Amador RN  Outcome: Not Progressing     Problem: NEUROSENSORY - ADULT  Goal: Achieves stable or improved neurological status  Description: INTERVENTIONS  - Monitor and report changes in neurological status  - Monitor vital signs such as temperature, blood pressure, glucose, and any other labs ordered   - Initiate measures to prevent increased intracranial pressure  - Monitor for seizure activity and implement precautions if appropriate      2/7/2021 2128 by Jomar Amador RN  Outcome: Progressing  2/7/2021 0805 by Jomar Amador RN  Outcome: Not Progressing  Goal: Achieves maximal functionality and self care  Description: INTERVENTIONS  - Monitor swallowing and airway patency with patient fatigue and changes in neurological status  - Encourage and assist patient to increase activity and self care     - Encourage visually impaired, hearing impaired and aphasic patients to use assistive/communication devices  2/7/2021 2128 by Jomar Amador RN  Outcome: Progressing  2/7/2021 0805 by Jomar Amador RN  Outcome: Not Progressing     Problem: CARDIOVASCULAR - ADULT  Goal: Maintains optimal cardiac output and hemodynamic stability  Description: INTERVENTIONS:  - Monitor I/O, vital signs and rhythm  - Monitor for S/S and trends of decreased cardiac output  - Administer and titrate ordered vasoactive medications to optimize hemodynamic stability  - Assess quality of pulses, skin color and temperature  - Assess for signs of decreased coronary artery perfusion  - Instruct patient to report change in severity of symptoms  2/7/2021 2128 by Arthur Avilez RN  Outcome: Progressing  2/7/2021 0805 by Arthur Avilez RN  Outcome: Not Progressing  Goal: Absence of cardiac dysrhythmias or at baseline rhythm  Description: INTERVENTIONS:  - Continuous cardiac monitoring, vital signs, obtain 12 lead EKG if ordered  - Administer antiarrhythmic and heart rate control medications as ordered  - Monitor electrolytes and administer replacement therapy as ordered  2/7/2021 2128 by Arthur Avilez RN  Outcome: Progressing  2/7/2021 0805 by Arthur Avilez RN  Outcome: Not Progressing     Problem: RESPIRATORY - ADULT  Goal: Achieves optimal ventilation and oxygenation  Description: INTERVENTIONS:  - Assess for changes in respiratory status  - Assess for changes in mentation and behavior  - Position to facilitate oxygenation and minimize respiratory effort  - Oxygen administered by appropriate delivery if ordered  - Initiate smoking cessation education as indicated  - Encourage broncho-pulmonary hygiene including cough, deep breathe, Incentive Spirometry  - Assess the need for suctioning and aspirate as needed  - Assess and instruct to report SOB or any respiratory difficulty  - Respiratory Therapy support as indicated  2/7/2021 2128 by Arthur Avilez RN  Outcome: Progressing  2/7/2021 0805 by Arthur Avilez RN  Outcome: Not Progressing     Problem: GENITOURINARY - ADULT  Goal: Maintains or returns to baseline urinary function  Description: INTERVENTIONS:  - Assess urinary function  - Encourage oral fluids to ensure adequate hydration if ordered  - Administer IV fluids as ordered to ensure adequate hydration  - Administer ordered medications as needed  - Offer frequent toileting  - Follow urinary retention protocol if ordered  2/7/2021 2128 by Leila Avelar RN  Outcome: Progressing  2/7/2021 0805 by Leila Avelar RN  Outcome: Not Progressing  Goal: Absence of urinary retention  Description: INTERVENTIONS:  - Assess patients ability to void and empty bladder  - Monitor I/O  - Bladder scan as needed  - Discuss with physician/AP medications to alleviate retention as needed  - Discuss catheterization for long term situations as appropriate  2/7/2021 2128 by Leila Avelar RN  Outcome: Progressing  2/7/2021 0805 by Leila Avelar RN  Outcome: Not Progressing  Goal: Urinary catheter remains patent  Description: INTERVENTIONS:  - Assess patency of urinary catheter  - If patient has a chronic bo, consider changing catheter if non-functioning  - Follow guidelines for intermittent irrigation of non-functioning urinary catheter  2/7/2021 2128 by Leila Avelar RN  Outcome: Progressing  2/7/2021 0805 by Leila Avelar RN  Outcome: Not Progressing     Problem: METABOLIC, FLUID AND ELECTROLYTES - ADULT  Goal: Electrolytes maintained within normal limits  Description: INTERVENTIONS:  - Monitor labs and assess patient for signs and symptoms of electrolyte imbalances  - Administer electrolyte replacement as ordered  - Monitor response to electrolyte replacements, including repeat lab results as appropriate  - Instruct patient on fluid and nutrition as appropriate  2/7/2021 2128 by Leila Avelar RN  Outcome: Progressing  2/7/2021 0805 by Leila Avelar RN  Outcome: Not Progressing  Goal: Fluid balance maintained  Description: INTERVENTIONS:  - Monitor labs   - Monitor I/O and WT  - Instruct patient on fluid and nutrition as appropriate  - Assess for signs & symptoms of volume excess or deficit  2/7/2021 2128 by Zeny Paredes RN  Outcome: Progressing  2/7/2021 0805 by Zeny Paredes RN  Outcome: Not Progressing  Goal: Glucose maintained within target range  Description: INTERVENTIONS:  - Monitor Blood Glucose as ordered  - Assess for signs and symptoms of hyperglycemia and hypoglycemia  - Administer ordered medications to maintain glucose within target range  - Assess nutritional intake and initiate nutrition service referral as needed  2/7/2021 2128 by Zeny Paredes RN  Outcome: Progressing  2/7/2021 0805 by Zeny Paredes RN  Outcome: Not Progressing     Problem: MUSCULOSKELETAL - ADULT  Goal: Maintain or return mobility to safest level of function  Description: INTERVENTIONS:  - Assess patient's ability to carry out ADLs; assess patient's baseline for ADL function and identify physical deficits which impact ability to perform ADLs (bathing, care of mouth/teeth, toileting, grooming, dressing, etc )  - Assess/evaluate cause of self-care deficits   - Assess range of motion  - Assess patient's mobility  - Assess patient's need for assistive devices and provide as appropriate  - Encourage maximum independence but intervene and supervise when necessary  - Involve family in performance of ADLs  - Assess for home care needs following discharge   - Consider OT consult to assist with ADL evaluation and planning for discharge  - Provide patient education as appropriate  2/7/2021 2128 by Zeny Paredes RN  Outcome: Progressing  2/7/2021 0805 by Zeny Paredes RN  Outcome: Not Progressing  Goal: Maintain proper alignment of affected body part  Description: INTERVENTIONS:  - Support, maintain and protect limb and body alignment  - Provide patient/ family with appropriate education  2/7/2021 2128 by Zeny Paredes RN  Outcome: Progressing  2/7/2021 0805 by Zeny Paredes RN  Outcome: Not Progressing     Problem: INFECTION - ADULT  Goal: Absence or prevention of progression during hospitalization  Description: INTERVENTIONS:  - Assess and monitor for signs and symptoms of infection  - Monitor lab/diagnostic results  - Monitor all insertion sites, i e  indwelling lines, tubes, and drains  - Monitor endotracheal if appropriate and nasal secretions for changes in amount and color  - [place] appropriate cooling/warming therapies per order  - Administer medications as ordered  - Instruct and encourage patient and family to use good hand hygiene technique  - Identify and instruct in appropriate isolation precautions for identified infection/condition  2/7/2021 2128 by Doc Ray RN  Outcome: Progressing  2/7/2021 0805 by Doc Ray RN  Outcome: Not Progressing     Problem: DISCHARGE PLANNING  Goal: Discharge to home or other facility with appropriate resources  Description: INTERVENTIONS:  - Identify barriers to discharge w/patient and caregiver  - Arrange for needed discharge resources and transportation as appropriate  - Identify discharge learning needs (meds, wound care, etc )  - Arrange for interpretive services to assist at discharge as needed  - Refer to Case Management Department for coordinating discharge planning if the patient needs post-hospital services based on physician/advanced practitioner order or complex needs related to functional status, cognitive ability, or social support system  2/7/2021 2128 by Doc Ray RN  Outcome: Progressing  2/7/2021 0805 by Doc Ray RN  Outcome: Not Progressing     Problem: Knowledge Deficit  Goal: Patient/family/caregiver demonstrates understanding of disease process, treatment plan, medications, and discharge instructions  Description: Complete learning assessment and assess knowledge base    Interventions:  - Provide teaching at level of understanding  - Provide teaching via preferred learning methods  2/7/2021 2128 by Doc Ray RN  Outcome: Progressing  2/7/2021 0805 by Doc aRy RN  Outcome: Not Progressing Problem: SAFETY,RESTRAINT: NV/NON-SELF DESTRUCTIVE BEHAVIOR  Goal: Remains free of harm/injury (restraint for non violent/non self-detsructive behavior)  Description: INTERVENTIONS:  - Instruct patient/family regarding restraint use   - Assess and monitor physiologic and psychological status   - Provide interventions and comfort measures to meet assessed patient needs   - Identify and implement measures to help patient regain control  - Assess readiness for release of restraint   2/7/2021 2128 by Rolly White RN  Outcome: Progressing  2/7/2021 0805 by Rolly White RN  Outcome: Not Progressing  Goal: Returns to optimal restraint-free functioning  Description: INTERVENTIONS:  - Assess the patient's behavior and symptoms that indicate continued need for restraint  - Identify and implement measures to help patient regain control  - Assess readiness for release of restraint   2/7/2021 2128 by Rolly White RN  Outcome: Progressing  2/7/2021 0805 by Rolly White RN  Outcome: Not Progressing

## 2021-02-08 NOTE — PROGRESS NOTES
NEPHROLOGY PROGRESS NOTE   Nolvia Nuñez 63 y o  male MRN: [id number]  Unit/Bed#: ICU 08 Encounter: [id number]  Reason for Consult: PATRICK/CKD    ASSESSMENT/PLAN:  1  Acute Kidney Injury, POA- secondary to COVID 19 cytokine release + contrast induced nephropathy (1/17) +/- vancomycin toxicity  - UA: large blood, innumerable RBC, fine and coarse granular casts, 2+ protein  - diuresing well with lasix 40mg IV BID but discontinued by critical care team after AM dose for concern for intravascular depletion  - good urine output, monitor  - creatinine stable around 1 5 now  - avoid hypotension, avoid nephrotoxins  2  Chronic Kidney Disease stage III- Baseline creatinine 1 1-1 3  May have higher baseline creatinine upon discharge  Follows with a nephrologist in the [place] area  3  Hypokalemia- resolved  4  Hypotension- pressors per critical care team  5  Volume Status- difficult examination from outside room  - per critical care, he is becoming intravascularly dry, holding lasix  - mild rise in bicarbonate  6  Anemia- hgb stable around 10 past few days  7  COVID 19- severe pathway per critical care team  - currently intubated    SUBJECTIVE:  Patient is intubated but awake  OBJECTIVE:  Current Weight: Weight - Scale: (!) 164 kg (361 lb 15 9 oz)  Vitals:    02/08/21 0900 02/08/21 0915 02/08/21 0930 02/08/21 0945   BP:       Pulse: 96 98 98 98   Resp: (!) 27 18 18 19   Temp: (!) 97 °F (36 1 °C) (!) 96 8 °F (36 °C) (!) 96 8 °F (36 °C) (!) 96 8 °F (36 °C)   TempSrc:       SpO2: (!) 88% 94% 95% 95%   Weight:       Height:           Intake/Output Summary (Last 24 hours) at 2/8/2021 1010  Last data filed at 2/8/2021 0800  Gross per 24 hour   Intake 2534 69 ml   Output 3410 ml   Net -875 31 ml     Due to COVID 19 infection, I examined the patient from outside his room through the glass door    I discussed his case with the ICU team     General: obese, NAD  Skin: no rash  Eyes: anicteric   Respiratory: intubated  Cardiac: regular on monitor  Extremities: + edema  GI: not distended  Neuro: awake    Medications:    Current Facility-Administered Medications:     acetaminophen (TYLENOL) oral suspension 650 mg, 650 mg, Per NG Tube, Q4H PRN, Jennifer Hinton PA-C    aspirin chewable tablet 81 mg, 81 mg, Oral, Daily, KATHY Hdz, 81 mg at 02/08/21 0836    atorvastatin (LIPITOR) tablet 40 mg, 40 mg, Per NG Tube, HS, Jennifer Hinton PA-C, 40 mg at 02/07/21 2107    bisacodyl (DULCOLAX) rectal suppository 10 mg, 10 mg, Rectal, Daily PRN, Jennifer Hinton PA-C    chlorhexidine (PERIDEX) 0 12 % oral rinse 15 mL, 15 mL, Swish & Spit, Q12H [address], Jennifer Hinton PA-C, 15 mL at 02/08/21 5564    cholecalciferol (VITAMIN D3) tablet 2,000 Units, 2,000 Units, Per NG Tube, Daily, Jennifer Hinton PA-C, 2,000 Units at 02/08/21 0835    [START ON 2/9/2021] dexamethasone (DECADRON) injection 4 mg, 4 mg, Intravenous, Daily, KATHY Hdz    ertapenem Saad Stahl) 1,000 mg in sodium chloride 0 9 % 50 mL IVPB, 1,000 mg, Intravenous, Q24H, Belem Augustine PA-C, Last Rate: 100 mL/hr at 02/08/21 0837, 1,000 mg at 02/08/21 0837    fentaNYL 1000 mcg in sodium chloride 0 9% 100mL infusion, 75 mcg/hr, Intravenous, Continuous, Bharti Duckworth PA-C, Last Rate: 7 5 mL/hr at 02/07/21 1515, 75 mcg/hr at 02/07/21 1515    fentanyl citrate (PF) 100 MCG/2ML 50 mcg, 50 mcg, Intravenous, Q1H PRN, KATHY Hdz, 50 mcg at 02/07/21 2129    heparin (porcine) 25,000 units in 0 45% NaCl 250 mL infusion (premix), 3-30 Units/kg/hr (Order-Specific), Intravenous, Titrated, Rhesa Rhyme, DO, Last Rate: 15 mL/hr at 02/08/21 0716, 12 Units/kg/hr at 02/08/21 0716    heparin (porcine) injection 10,000 Units, 10,000 Units, Intravenous, Q1H PRN, Rhesa Rhyme, DO    heparin (porcine) injection 5,000 Units, 5,000 Units, Intravenous, Q1H PRN, Rhesa Rhyme, DO, 5,000 Units at 02/08/21 0835    insulin lispro (HumaLOG) 100 units/mL subcutaneous injection 1-6 Units, 1-6 Units, Subcutaneous, Q6H [address], 1 Units at 02/07/21 0608 **AND** Fingerstick Glucose (POCT), , , Q6H, KATHY Otero    lactulose oral solution 10 g, 10 g, Oral, Daily, Scott Madrid MD, 10 g at 02/08/21 0836    levETIRAcetam (KEPPRA) oral solution 750 mg, 750 mg, Oral, Q12H [address], KATHY Domingo, 750 mg at 02/08/21 0836    melatonin tablet 6 mg, 6 mg, Oral, HS, KATHY Domingo, 6 mg at 02/07/21 2108    midodrine (PROAMATINE) tablet 5 mg, 5 mg, Oral, Q8H, KATHY Domingo    [COMPLETED] zinc sulfate (ZINCATE) capsule 220 mg, 220 mg, Per NG Tube, Daily, 220 mg at 01/24/21 0824 **FOLLOWED BY** multivitamin with iron-minerals liquid 15 mL, 15 mL, Per NG Tube, Daily, JIM MillerC, 15 mL at 02/07/21 0857    NOREPINEPHRINE 4 MG  ML NSS (CMPD ORDER) infusion, 1-30 mcg/min, Intravenous, Titrated, KATHY Otero, Stopped at 02/08/21 7844    omeprazole (PRILOSEC) suspension 2 mg/mL, 20 mg, Oral, Q12H, KATHY Domingo, 20 mg at 02/07/21 1502    polyethylene glycol (MIRALAX) packet 17 g, 17 g, Oral, BID, JIM MillerC, 17 g at 02/08/21 0834    propofol (DIPRIVAN) 1000 mg in 100 mL infusion (premix), 5-50 mcg/kg/min, Intravenous, Titrated, JIM MillerC, Stopped at 02/07/21 0620    senna-docusate sodium (SENOKOT S) 8 6-50 mg per tablet 1 tablet, 1 tablet, Per NG Tube, BID, KATHY Otero, 1 tablet at 02/08/21 0835    sertraline (ZOLOFT) tablet 50 mg, 50 mg, Oral, HS, KATHY Otero, 50 mg at 02/07/21 2107    sertraline (ZOLOFT) tablet 75 mg, 75 mg, Oral, Daily, KATHY Otero, 75 mg at 02/08/21 5896    Laboratory Results:  Results from last 7 days   Lab Units 02/08/21  0449 02/07/21  0332 02/06/21  0414 02/05/21  0525 02/04/21  0459 02/03/21  0504 02/03/21  0457 02/02/21  1250 02/02/21  0955 02/02/21  0442 02/01/21  2059   WBC Thousand/uL 12 93* 14 19* 15 08* 21 84* 24 84* 15 39*  --  13 36*  --  10 68*  --    HEMOGLOBIN g/dL 10 1* 9 9* 9 7* 10 3* 11 8* 12 0  --  11 7*  --  10 6*  --    I STAT HEMOGLOBIN g/dl  --   --   --   --   --   --   --   --  11 2*  --  13 3   HEMATOCRIT % 35 6* 34 4* 32 8* 34 7* 38 9 41 6  --  40 7  --  36 3*  --    HEMATOCRIT, ISTAT %  --   --   --   --   --   --   --   --  33*  --  39   PLATELETS Thousands/uL 234 213 181 196 232 224  --  222  --  204  --    POTASSIUM mmol/L 4 2 3 8 3 5 3 5 3 7  --  4 0  --   --  3 4*  --    CHLORIDE mmol/L 106 102 105 106 107  --  104  --   --  105  --    CO2 mmol/L 33* 32 32 32 30  --  35*  --   --  32  --    CO2, I-STAT mmol/L  --   --   --   --   --   --   --   --  37*  --  36*   BUN mg/dL 60* 59* 52* 54* 59*  --  60*  --   --  64*  --    CREATININE mg/dL 1 53* 1 48* 1 50* 1 68* 1 86*  --  1 86*  --   --  1 81*  --    CALCIUM mg/dL 8 1* 8 1* 7 9* 7 7* 8 1*  --  8 4  --   --  8 1*  --    MAGNESIUM mg/dL 2 4 2 5 1 9  --  1 6  --  1 6  --   --   --   --    PHOSPHORUS mg/dL 4 6* 4 1 4 5*  --  3 1  --  2 7  --   --   --   --    GLUCOSE, ISTAT mg/dl  --   --   --   --   --   --   --   --  105  --  148*

## 2021-02-08 NOTE — PROGRESS NOTES
Daily Progress Note - Critical Care   Lizet Friedman 63 y o  male MRN: [id number]  Unit/Bed#: ICU 08 Encounter: [id number]        ----------------------------------------------------------------------------------------  HPI/24hr events:   · Increased vasopressor requirements overnight, now improved  · Able to wean FiO2 to 60%    ---------------------------------------------------------------------------------------  SUBJECTIVE  Unable to provide verbal complaints     Review of Systems   Unable to perform ROS: Intubated     Review of systems was unable to be performed secondary to intubation  ---------------------------------------------------------------------------------------  Assessment and Plan    Neuro:   · Possible seizure-like activity   ? Keppra 750mg q12h   ? Discuss with neuro considering long term given questionable true seizure and no recurrence    · Prior CVA  ? ASA 81mg daily  ? Atorvastatin 40mg daily   · Major depressive disorder  ? Sertraline 75mg daily, 50mg qHS   ? Seen by behavioral health on 1/22/21  · Sedation/analgesia   ? Continuous infusions:   § Fentanyl 75 mcg/hr   § Goal RASS 0 to -1  ? PRN medications   § 1 dose / 24 hours   ? Routine neuro checks     ? Sleep/wake cycle regulation as able   § Melatonin 6mg qHS        CV:   · Shock  ? Likely secondary to effects of sedation  ? Levophed  1 mcg/min   ? Titrate to MAP > 65  ? Midodrine 5mg TID   ? Change to q8h to avoid drops in BP overnight   ? Hopefully BP will improve as sedation is modified   ? No signs of worsening sepsis at this  ? Continues to tolerate diuresis   ? Home medications on hold:   ? Bumex 2mg daily   ? Cardizem CD 180mg q24h   ? Metoprolol tartrate 50mg TID   · Paroxysmal A fib with RVR  ? Currently NSR, rate controlled  ? Continue telemetry monitoring  ? Optimize electrolytes K > 4 0, mag > 2 0  ?  Holding home metoprolol secondary to vasopressors, but may consider trial of low-dose today now that levophed is down ? Not on home ATC secondary to history GI bleed  ? Heparin drip at this time   · Chronic diastolic CHF  ?  Echo: EF 55%, unable to determine RWMA, bioprosthetic AV  ? Lasix 40mg BID   ? Hold PM dose due to increasing serum CO2 and creatinine   ? BNP 1470 (21)   · Hyperlipidemia   ? Atorvastatin 40mg qHS         Pulm:  · Acute hypoxic respiratory failure secondary to COVID-19 (POA)   ? Mechanical Ventilation Day # 8  ? P:F ratio on most recent AB (PEEP 12)   ? Maintain VT 6-8ml/kg IBW: 75 3 kg  ? Wean PEEP to 10 today as able   ? Maintain SpO2 > 90% per protocol as able   ? Suction as needed and closely monitor secretions  Maintain HOB >30 degrees  Q4h oral care with chlorhexidine BID  ? Monitor peak and plateau airway pressures  Maintain Ppeak < 45cm H2O, Pplat < 30cm H2O   ? See ID for COVID-related treatment plan      GI:   · Prior GI bleed   ? Given steroids and anticoagulation, risk of withholding PPI outweighs possible benefit to COVID with famotidine  ? Will discontinue famotidine and start omeprazole 20mg q12h   · Bowel regimen:  ? Lactulose 10mg daily   ? Miralax daily   ? Senna/colace qHS  § Increase to BID     :   · PATRICK superimposed on CKD-3  ? Secondary to systemic effects of COVID, RUFINA, hypotension/shock  ? Nephrology following, appreciate recommendations  ? Baseline creatinine: 1 1 - 1 3  ? Admission creatinine: 1 34  ? Creatinine peak: 2 71  ? Current creatinine: 1 53  ? Will hold off PM dose of lasix   ? Strict q2h I/O monitoring  ? Maintain bo catheter   ? Continue to follow renal function tests       F/E/N:   · Fluids:   ? Maintenance fluids: None  ? Diuresis plan: Lasix 40mg BID  ? Hold afternoon dose in order to evaluate renal function tomorrow   · Electrolytes:   ? Replete electrolytes with as needed to maintain K >4 0, Mag >2 0, Phos >3 0  ?  Replaced potassium and magnesium this AM  · Nutrition:   ? Jevity 1 5 45 ml/hr, 2 packets prosource daily        Heme/Onc: · Anemia  ? Multifactorial secondary to PATRICK on CKD, phlebotomy  ? Concern for ongoing drop despite negative fluid balance  ? Baseline hemoglobin: 9 6 - 8 1   ? Admission hemoglobin: 12 5  ? Current hemoglobin: 10 1  ? Transfuse for hemoglobin <7 0  ? Closely monitor for signs of GI bleeding  · Hypercoaguable state secondary to COVID-19  ? D-Dimer: 1 47 (2/6/21)   § Down from peak of 19 40  ? Continue systemic anticoagulation with heparin drip   · VTE prophylaxis:  Heparin gtt, SCD's to BLE       Endo:   · Steroid-induced hyperglycemia  ? Last hemoglobin A1c 6 2% on 2/13/19  ? Continue low-dose insulin regimen  ? Can stop of not requiring over next 24 hours  ? Adjust as needed to maintain goal -180        ID:   · COVID-19 pneumonia (POA)   ? 1/17/21 COVID-19: Positive   ? Continue anti-inflammatories/COVID therapies:   ? Dexamethasone 4 mg IV q12h  ? Weaned last on 2/6/21   ? Drop to daily for three days, then off   ? Atorvastatin 40mg qHS   ? MVI daily   ? Vitamin D3 2000 IU daily  ? Completed therapies:   ? Convalescent Plasma (1/17/21)  ? Actemra (1/28/21)  ? Vitamin C 1000mg BID   ? Zinc 220mg daily   ? Inflammatory markers:   ? Ferritin: 170 (2/6/21)   ? CRP: 66 7 (2/6/21)   · ESBL klebsiella pneumonia   ? Continue ertapenem   ? Day # 2  ? Procalcitonin: 0 45 (2/7/21)  ? 2/4/21 BCXx2: No growth   ? 2/4/21Sputum Cx: ESBL e coli   ? 2/5/21 MRSA: Negative   ? Continue to monitor fever and WBC curve   ? CBCD daily  · Mixed gram positive bacteremia  ? Cultures from [facility] with 1/2 sets growing separate colonies of staph coag-negative   ? Repeat cultures from [facility] with enterococcus faecalis and staph coag negative   ? Likely contamination   ? TTE negative for vegetation   ? Surveillance cultures negative to date   ? Needs outpatient blood cultures on 2/11/20  ?  ID signed off        MSK/Skin:   · PT/OT when medically appropriate   · Reposition q2h, eliminate pressure points while in bed  · Close skin surveillance     Disposition: Continue Critical Care   Code Status: Level 1 - Full Code  ---------------------------------------------------------------------------------------  Invasive Devices Review  Invasive Devices     Peripheral Intravenous Line            Long-Dwell Peripheral IV (Midline)  Left Cephalic 13 days    Peripheral IV 21 Right;Ventral (anterior) Arm 2 days    Peripheral IV 21 Left;Ventral (anterior) Forearm 2 days          Arterial Line            Arterial Line 21 Radial 4 days          Drain            Urethral Catheter Straight-tip 16 Fr  21 days    NG/OG/Enteral Tube Orogastric 6 days          Airway            ETT  Cuffed; Hi-Lo 8 mm 6 days              Can any invasive devices be discontinued today? No  ---------------------------------------------------------------------------------------  OBJECTIVE    Vitals   Vitals:    21 0737 21 0745 21 0800 21 0815   BP:       Pulse:  100 94 92   Resp:   19 19   Temp:  (!) 97 °F (36 1 °C) (!) 97 °F (36 1 °C) (!) 97 °F (36 1 °C)   TempSrc:       SpO2: 92% 94% 96% 94%   Weight:       Height:         Temp (24hrs), Av 2 °F (36 2 °C), Min:96 8 °F (36 °C), Max:98 8 °F (37 1 °C)  Current: Temperature: (!) 97 °F (36 1 °C)  HR: 80 - 109   BP: 86//68  RR: 15 - 30  SpO2: 80 - 99%    Ventilator Settings:   Resp Rate (BPM): 18 BPM  VT (mL): 450  FIO2: 60%  PEEP (cmH20): 12    Observed  Resp Rate (BPM): 20 BPM  Peak Pressure (cmH2O): 34 cmH2O  Plateau Pressure (cm H2O): 30 cm H2O      Physical Exam  Vitals signs reviewed  Constitutional:       General: He is awake  Appearance: Normal appearance  He is obese  Interventions: He is intubated and restrained  HENT:      Head: Normocephalic and atraumatic  Eyes:      Conjunctiva/sclera: Conjunctivae normal       Pupils: Pupils are equal, round, and reactive to light  Neck:      Musculoskeletal: Full passive range of motion without pain  Cardiovascular:      Rate and Rhythm: Normal rate and regular rhythm  Pulses:           Radial pulses are 2+ on the right side and 2+ on the left side  Dorsalis pedis pulses are 1+ on the right side and 1+ on the left side  Heart sounds: Normal heart sounds  Pulmonary:      Effort: Pulmonary effort is normal  He is intubated  Breath sounds: Examination of the right-lower field reveals decreased breath sounds  Examination of the left-lower field reveals decreased breath sounds  Decreased breath sounds present  No wheezing or rhonchi  Comments: Blood-tinged sputum   Abdominal:      General: Abdomen is protuberant  Palpations: Abdomen is soft  Tenderness: There is no abdominal tenderness  Genitourinary:     Comments: Turner: clear, light yellow urine   Musculoskeletal:      Right lower le+ Edema present  Left lower le+ Edema present  Skin:     General: Skin is warm and dry  Neurological:      Mental Status: He is alert  GCS: GCS eye subscore is 4  GCS verbal subscore is 1  GCS motor subscore is 6  Psychiatric:         Behavior: Behavior is cooperative             Laboratory and Diagnostics:  Results from last 7 days   Lab Units 21  0449 21  0332 21  0414 21  0525 21  0459 21  0504 21  1250  21  0442   WBC Thousand/uL 12 93* 14 19* 15 08* 21 84* 24 84* 15 39* 13 36*  --  10 68*   HEMOGLOBIN g/dL 10 1* 9 9* 9 7* 10 3* 11 8* 12 0 11 7*  --  10 6*   I STAT HEMOGLOBIN   --   --   --   --   --   --   --    < >  --    HEMATOCRIT % 35 6* 34 4* 32 8* 34 7* 38 9 41 6 40 7  --  36 3*   HEMATOCRIT, ISTAT   --   --   --   --   --   --   --    < >  --    PLATELETS Thousands/uL 234 213 181 196 232 224 222  --  204   NEUTROS PCT %  --   --  92*  --  91*  --   --   --  91*   BANDS PCT % 2 6  --   --   --  2  --   --   --    MONOS PCT %  --   --  3*  --  3*  --   --   --  4   MONO PCT % 7 3*  --  1*  --  3*  --   --   -- < > = values in this interval not displayed  Results from last 7 days   Lab Units 02/08/21 0449 02/07/21 0332 02/06/21 0414 02/05/21 0525 02/04/21 0459 02/03/21 0457 02/02/21  0955 02/02/21 0442 02/01/21  1058   SODIUM mmol/L 144 142 143 143 144 144  --  142  --  145   POTASSIUM mmol/L 4 2 3 8 3 5 3 5 3 7 4 0  --  3 4*  --  4 3   CHLORIDE mmol/L 106 102 105 106 107 104  --  105  --  105   CO2 mmol/L 33* 32 32 32 30 35*  --  32  --  36*   CO2, I-STAT mmol/L  --   --   --   --   --   --  37*  --    < >  --    ANION GAP mmol/L 5 8 6 5 7 5  --  5  --  4   BUN mg/dL 60* 59* 52* 54* 59* 60*  --  64*  --  68*   CREATININE mg/dL 1 53* 1 48* 1 50* 1 68* 1 86* 1 86*  --  1 81*  --  2 05*   CALCIUM mg/dL 8 1* 8 1* 7 9* 7 7* 8 1* 8 4  --  8 1*  --  8 5   GLUCOSE RANDOM mg/dL 123 181* 176* 147* 144* 101  --  135  --  104   ALT U/L  --   --   --  19 16 20  --  22  --  23   AST U/L  --   --   --  19 13 16  --  15  --  14   ALK PHOS U/L  --   --   --  67 63 64  --  56  --  60   ALBUMIN g/dL  --   --   --  2 3* 2 8* 3 1*  --  2 8*  --  3 3*   TOTAL BILIRUBIN mg/dL  --   --   --  0 54 0 60 0 58  --  0 51  --  0 64    < > = values in this interval not displayed  Results from last 7 days   Lab Units 02/08/21 0449 02/07/21 0332 02/06/21 0414 02/04/21 0459 02/03/21 0457   MAGNESIUM mg/dL 2 4 2 5 1 9 1 6 1 6   PHOSPHORUS mg/dL 4 6* 4 1 4 5* 3 1 2 7      Results from last 7 days   Lab Units 02/08/21  0449 02/07/21  0344 02/06/21  1911 02/06/21  1327 02/06/21  0414 02/05/21  2104 02/05/21  0525  02/02/21  1250   INR   --   --   --   --   --   --   --   --  1 22*   PTT seconds 52* 68* 60* 66* 114* 51* 70*   < > 31    < > = values in this interval not displayed        Results from last 7 days   Lab Units 02/01/21  1058   TROPONIN I ng/mL 0 03     Results from last 7 days   Lab Units 02/07/21  0332 02/02/21  0442   LACTIC ACID mmol/L 0 8 1 2     ABG:  Results from last 7 days   Lab Units 02/08/21  0450   PH ART  7 308* PCO2 ART mm Hg 66 8*   PO2 ART mm Hg 72 7*   HCO3 ART mmol/L 32 7*   BASE EXC ART mmol/L 4 8   ABG SOURCE  Line, Arterial       Results from last 7 days   Lab Units 02/07/21  0331 02/06/21  0414 02/04/21  0459 02/03/21  0457 02/01/21  1058   PROCALCITONIN ng/ml 0 45* 0 96* 0 38* 0 45* 0 13       Micro  Results from last 7 days   Lab Units 02/05/21  2104 02/04/21  1703 02/04/21  1220 02/01/21  1208 02/01/21  1200   BLOOD CULTURE   --   --  No Growth at 72 hrs  No Growth at 72 hrs  No Growth After 5 Days  No Growth After 5 Days  SPUTUM CULTURE   --  Few Colonies of Escherichia coli ESBL*  Few Colonies of   --   --   --    GRAM STAIN RESULT   --  1+ Polys  No bacteria seen  --   --   --    MRSA CULTURE ONLY  No Methicillin Resistant Staphlyococcus aureus (MRSA) isolated  --   --   --   --        EKG: NSR - low ST   Imaging: No new imaging   I have personally reviewed pertinent reports  and I have personally reviewed pertinent films in PACS    Intake and Output  I/O       02/06 0701 - 02/07 0700 02/07 0701 - 02/08 0700 02/08 0701 - 02/09 0700    P  O  0 0     I V  (mL/kg) 1306 5 (7 9) 1047 4 (6 4)     NG/ 450     IV Piggyback 250 50     Feedings 906 1103     Total Intake(mL/kg) 2982 5 (18) 2650 4 (16 2)     Urine (mL/kg/hr) 2585 (0 6) 3405 (0 9)     Emesis/NG output 0 0     Other 0      Stool 0 0     Total Output 2585 3405     Net +397 5 -754  6            Unmeasured Stool Occurrence 1 x 3 x         UOP: 141 ml/hr     Height and Weights   Height: 5' 11" (180 3 cm)  IBW: 75 3 kg  Body mass index is 50 49 kg/m²    Weight (last 2 days)     Date/Time   Weight    02/08/21 0600   (!) 164 (362)    02/07/21 0600   (!) 166 (365 96)    02/06/21 0600   (!) 165 (364 2)                Nutrition       Diet Orders   (From admission, onward)             Start     Ordered    02/06/21 1432  Diet Enteral/Parenteral; Tube Feeding No Oral Diet; Jevity 1 5; Continuous; 45; Prosource Protein Liquid - Two Packets  Diet effective now     Question Answer Comment   Diet Type Enteral/Parenteral    Enteral/Parenteral Tube Feeding No Oral Diet    Tube Feeding Formula: Jevity 1 5    Bolus/Cyclic/Continuous Continuous    Tube Feeding Goal Rate (mL/hr): 45    Prosource Protein Liquid - No Carb Prosource Protein Liquid - Two Packets    RD to adjust diet per protocol?  Yes        02/06/21 1431    01/17/21 1719  Room Service  Once     Question:  Type of Service  Answer:  Room Service- Not Appropriate    01/17/21 1718              Tube feeds currently running at goal       Active Medications  Scheduled Meds:  Current Facility-Administered Medications   Medication Dose Route Frequency Provider Last Rate    acetaminophen  650 mg Per NG Tube Q4H PRN [place] HERBERT Molina      aspirin  81 mg Oral Daily KATHY Fallon      atorvastatin  40 mg Per NG Tube HS [place] HERBERT Molina      bisacodyl  10 mg Rectal Daily PRN [place] HERBERT Molina      chlorhexidine  15 mL Swish & Spit Q12H [address] Alba Garcia      cholecalciferol  2,000 Units Per NG Tube Daily Paris Molina PA-C      dexamethasone  4 mg Intravenous Q12H [address] KATHY Fallon      ertapenem  1,000 mg Intravenous Q24H Roy Aschoff, PA-C 1,000 mg (02/08/21 0837)    fentaNYL  75 mcg/hr Intravenous Continuous Jodi Luna PA-C 75 mcg/hr (02/07/21 1515)    fentanyl citrate (PF)  50 mcg Intravenous Q1H PRN KATHY Fallon      furosemide  40 mg Intravenous BID (diuretic) Mariam Marc MD      heparin (porcine)  3-30 Units/kg/hr (Order-Specific) Intravenous Titrated Ama Senate, DO 12 Units/kg/hr (02/08/21 0716)    heparin (porcine)  10,000 Units Intravenous Q1H PRN Ama Senate, DO      heparin (porcine)  5,000 Units Intravenous Q1H PRN Ama Senate, DO      insulin lispro  1-6 Units Subcutaneous Q6H [address] KATHY Domingo      lactulose  10 g Oral Daily Mariam Marc MD      levETIRAcetam  750 mg Oral Q12H [address] Todd Costello KATHY      melatonin  6 mg Oral HS KATHY Kent      midodrine  5 mg Oral TID [facility] Amanda Patel PA-C      multivitamin with iron-minerals  15 mL Per NG Tube Daily Ed HERBERT Cardenas      norepinephrine  1-30 mcg/min Intravenous Titrated KATHY Kent 2 mcg/min (02/08/21 0735)    omeprazole (PRILOSEC) suspension 2 mg/mL  20 mg Oral Q12H KATHY Kent      polyethylene glycol  17 g Oral BID Ed HERBERT Cardenas      propofol  5-50 mcg/kg/min Intravenous Titrated Ed HERBERT Cardenas Stopped (02/07/21 3090)    senna-docusate sodium  1 tablet Per NG Tube BID KATHY Kent      sertraline  50 mg Oral HS KATHY Kent      sertraline  75 mg Oral Daily KATHY Domingo       Continuous Infusions:  fentaNYL, 75 mcg/hr, Last Rate: 75 mcg/hr (02/07/21 1515)  heparin (porcine), 3-30 Units/kg/hr (Order-Specific), Last Rate: 12 Units/kg/hr (02/08/21 0716)  norepinephrine, 1-30 mcg/min, Last Rate: 2 mcg/min (02/08/21 0735)  propofol, 5-50 mcg/kg/min, Last Rate: Stopped (02/07/21 0620)      PRN Meds:   acetaminophen, 650 mg, Q4H PRN  bisacodyl, 10 mg, Daily PRN  fentanyl citrate (PF), 50 mcg, Q1H PRN  heparin (porcine), 10,000 Units, Q1H PRN  heparin (porcine), 5,000 Units, Q1H PRN        Allergies   Allergies   Allergen Reactions    Amiodarone      Developed Rash    Penicillins Rash     However, has subsequently tolerated Cefazolin and Cefepime     ---------------------------------------------------------------------------------------  Care Time Delivered:   Upon my evaluation, this patient had a high probability of imminent or life-threatening deterioration due to septic shock, hypoxic respiratory failure, PATRICK, which required my direct attention, intervention, and personal management    I have personally provided 30 minutes ([phone] hg [phone] of critical care time, exclusive of procedures, teaching, family meetings, and any prior time recorded by providers other than myself  KATHY Krishnamurthy        Portions of the record may have been created with voice recognition software  Occasional wrong word or "sound a like" substitutions may have occurred due to the inherent limitations of voice recognition software    Read the chart carefully and recognize, using context, where substitutions have occurred

## 2021-02-08 NOTE — NURSING NOTE
Discussed need for IR central line placement with ICU team, patient is no longer on pressors and no longer requires central access  Please re consult IR if our services are required

## 2021-02-09 PROBLEM — D62 ACUTE BLOOD LOSS ANEMIA: Status: RESOLVED | Noted: 2019-01-29 | Resolved: 2021-02-09

## 2021-02-09 PROBLEM — R78.81 MSSA BACTEREMIA: Status: RESOLVED | Noted: 2019-01-25 | Resolved: 2021-02-09

## 2021-02-09 PROBLEM — N18.30 CKD (CHRONIC KIDNEY DISEASE), STAGE III (HCC): Chronic | Status: ACTIVE | Noted: 2021-02-09

## 2021-02-09 PROBLEM — H43.813 VITREOUS DEGENERATION OF BOTH EYES: Chronic | Status: ACTIVE | Noted: 2019-02-28

## 2021-02-09 PROBLEM — J15.5 PNEUMONIA DUE TO ESCHERICHIA COLI (HCC): Status: ACTIVE | Noted: 2021-02-09

## 2021-02-09 PROBLEM — I82.C19 ACUTE VENOUS EMBOLISM AND THROMBOSIS OF INTERNAL JUGULAR VEINS (HCC): Chronic | Status: ACTIVE | Noted: 2019-03-05

## 2021-02-09 PROBLEM — R31.9 HEMATURIA: Status: RESOLVED | Noted: 2021-01-21 | Resolved: 2021-02-09

## 2021-02-09 PROBLEM — E87.0 HYPERNATREMIA: Status: RESOLVED | Noted: 2021-01-27 | Resolved: 2021-02-09

## 2021-02-09 PROBLEM — K92.2 GI BLEEDING: Status: RESOLVED | Noted: 2019-01-24 | Resolved: 2021-02-09

## 2021-02-09 PROBLEM — I48.19 PERSISTENT ATRIAL FIBRILLATION (HCC): Chronic | Status: ACTIVE | Noted: 2019-01-24

## 2021-02-09 PROBLEM — Z86.73 HISTORY OF STROKE: Chronic | Status: ACTIVE | Noted: 2021-02-02

## 2021-02-09 PROBLEM — I51.81 STRESS-INDUCED CARDIOMYOPATHY: Chronic | Status: ACTIVE | Noted: 2019-01-24

## 2021-02-09 PROBLEM — E66.01 MORBID OBESITY (HCC): Chronic | Status: ACTIVE | Noted: 2019-03-05

## 2021-02-09 PROBLEM — N18.30 CKD (CHRONIC KIDNEY DISEASE), STAGE III (HCC): Status: ACTIVE | Noted: 2021-02-09

## 2021-02-09 PROBLEM — N18.9 ACUTE ON CHRONIC RENAL FAILURE (HCC): Status: RESOLVED | Noted: 2019-02-19 | Resolved: 2021-02-09

## 2021-02-09 PROBLEM — B95.61 MSSA BACTEREMIA: Status: RESOLVED | Noted: 2019-01-25 | Resolved: 2021-02-09

## 2021-02-09 PROBLEM — R93.0 ABNORMAL CT OF THE HEAD: Status: RESOLVED | Noted: 2019-04-15 | Resolved: 2021-02-09

## 2021-02-09 PROBLEM — L03.114 CELLULITIS OF LEFT FOREARM: Status: RESOLVED | Noted: 2019-03-05 | Resolved: 2021-02-09

## 2021-02-09 PROBLEM — Z95.4: Status: ACTIVE | Noted: 2021-02-09

## 2021-02-09 PROBLEM — N17.9 ACUTE ON CHRONIC RENAL FAILURE (HCC): Status: RESOLVED | Noted: 2019-02-19 | Resolved: 2021-02-09

## 2021-02-09 PROBLEM — F32.A DEPRESSION: Chronic | Status: ACTIVE | Noted: 2019-03-13

## 2021-02-09 LAB
ANION GAP SERPL CALCULATED.3IONS-SCNC: 6 MMOL/L (ref 4–13)
APTT PPP: 59 SECONDS (ref 23–37)
APTT PPP: 67 SECONDS (ref 23–37)
APTT PPP: 72 SECONDS (ref 23–37)
BACTERIA BLD CULT: NORMAL
BACTERIA BLD CULT: NORMAL
BASE EXCESS BLDA CALC-SCNC: 5.8 MMOL/L
BASOPHILS # BLD AUTO: 0.05 THOUSANDS/ΜL (ref 0–0.1)
BASOPHILS NFR BLD AUTO: 1 % (ref 0–1)
BUN SERPL-MCNC: 64 MG/DL (ref 5–25)
CALCIUM SERPL-MCNC: 8.6 MG/DL (ref 8.3–10.1)
CHLORIDE SERPL-SCNC: 107 MMOL/L (ref 100–108)
CO2 SERPL-SCNC: 32 MMOL/L (ref 21–32)
CREAT SERPL-MCNC: 1.49 MG/DL (ref 0.6–1.3)
EOSINOPHIL # BLD AUTO: 0.79 THOUSAND/ΜL (ref 0–0.61)
EOSINOPHIL NFR BLD AUTO: 8 % (ref 0–6)
ERYTHROCYTE [DISTWIDTH] IN BLOOD BY AUTOMATED COUNT: 21.9 % (ref 11.6–15.1)
GFR SERPL CREATININE-BSD FRML MDRD: 50 ML/MIN/1.73SQ M
GLUCOSE SERPL-MCNC: 112 MG/DL (ref 65–140)
GLUCOSE SERPL-MCNC: 122 MG/DL (ref 65–140)
GLUCOSE SERPL-MCNC: 129 MG/DL (ref 65–140)
GLUCOSE SERPL-MCNC: 136 MG/DL (ref 65–140)
HCO3 BLDA-SCNC: 31.9 MMOL/L (ref 22–28)
HCT VFR BLD AUTO: 34.2 % (ref 36.5–49.3)
HGB BLD-MCNC: 9.8 G/DL (ref 12–17)
HOROWITZ INDEX BLDA+IHG-RTO: 60 MM[HG]
IMM GRANULOCYTES # BLD AUTO: >0.5 THOUSAND/UL (ref 0–0.2)
IMM GRANULOCYTES NFR BLD AUTO: 6 % (ref 0–2)
LYMPHOCYTES # BLD AUTO: 0.73 THOUSANDS/ΜL (ref 0.6–4.47)
LYMPHOCYTES NFR BLD AUTO: 7 % (ref 14–44)
MAGNESIUM SERPL-MCNC: 2.6 MG/DL (ref 1.6–2.6)
MCH RBC QN AUTO: 23.7 PG (ref 26.8–34.3)
MCHC RBC AUTO-ENTMCNC: 28.7 G/DL (ref 31.4–37.4)
MCV RBC AUTO: 83 FL (ref 82–98)
MONOCYTES # BLD AUTO: 1.08 THOUSAND/ΜL (ref 0.17–1.22)
MONOCYTES NFR BLD AUTO: 10 % (ref 4–12)
NEUTROPHILS # BLD AUTO: 7.07 THOUSANDS/ΜL (ref 1.85–7.62)
NEUTS SEG NFR BLD AUTO: 68 % (ref 43–75)
NRBC BLD AUTO-RTO: 0 /100 WBCS
O2 CT BLDA-SCNC: 13.5 ML/DL (ref 16–23)
OXYHGB MFR BLDA: 90.3 % (ref 94–97)
PCO2 BLDA: 54 MM HG (ref 36–44)
PEEP RESPIRATORY: 10 CM[H2O]
PH BLDA: 7.39 [PH] (ref 7.35–7.45)
PLATELET # BLD AUTO: 194 THOUSANDS/UL (ref 149–390)
PMV BLD AUTO: 11.7 FL (ref 8.9–12.7)
PO2 BLDA: 59.9 MM HG (ref 75–129)
POTASSIUM SERPL-SCNC: 3.8 MMOL/L (ref 3.5–5.3)
RBC # BLD AUTO: 4.13 MILLION/UL (ref 3.88–5.62)
SODIUM SERPL-SCNC: 145 MMOL/L (ref 136–145)
SPECIMEN SOURCE: ABNORMAL
VENT AC: 18
VENT- AC: AC
VT SETTING VENT: 450 ML
WBC # BLD AUTO: 10.37 THOUSAND/UL (ref 4.31–10.16)

## 2021-02-09 PROCEDURE — 94760 N-INVAS EAR/PLS OXIMETRY 1: CPT

## 2021-02-09 PROCEDURE — 99291 CRITICAL CARE FIRST HOUR: CPT | Performed by: NURSE PRACTITIONER

## 2021-02-09 PROCEDURE — 85730 THROMBOPLASTIN TIME PARTIAL: CPT | Performed by: NURSE PRACTITIONER

## 2021-02-09 PROCEDURE — 85730 THROMBOPLASTIN TIME PARTIAL: CPT | Performed by: PHYSICIAN ASSISTANT

## 2021-02-09 PROCEDURE — 82948 REAGENT STRIP/BLOOD GLUCOSE: CPT

## 2021-02-09 PROCEDURE — 94003 VENT MGMT INPAT SUBQ DAY: CPT

## 2021-02-09 PROCEDURE — 80048 BASIC METABOLIC PNL TOTAL CA: CPT | Performed by: PHYSICIAN ASSISTANT

## 2021-02-09 PROCEDURE — 94150 VITAL CAPACITY TEST: CPT

## 2021-02-09 PROCEDURE — 83735 ASSAY OF MAGNESIUM: CPT | Performed by: PHYSICIAN ASSISTANT

## 2021-02-09 PROCEDURE — 99232 SBSQ HOSP IP/OBS MODERATE 35: CPT | Performed by: INTERNAL MEDICINE

## 2021-02-09 PROCEDURE — 82805 BLOOD GASES W/O2 SATURATION: CPT | Performed by: PHYSICIAN ASSISTANT

## 2021-02-09 PROCEDURE — 85027 COMPLETE CBC AUTOMATED: CPT | Performed by: PHYSICIAN ASSISTANT

## 2021-02-09 PROCEDURE — 99292 CRITICAL CARE ADDL 30 MIN: CPT | Performed by: INTERNAL MEDICINE

## 2021-02-09 RX ORDER — FUROSEMIDE 10 MG/ML
40 INJECTION INTRAMUSCULAR; INTRAVENOUS DAILY
Status: DISCONTINUED | OUTPATIENT
Start: 2021-02-09 | End: 2021-02-15

## 2021-02-09 RX ADMIN — LEVETIRACETAM 750 MG: 100 SOLUTION ORAL at 20:00

## 2021-02-09 RX ADMIN — FENTANYL CITRATE 50 MCG: 50 INJECTION INTRAMUSCULAR; INTRAVENOUS at 15:02

## 2021-02-09 RX ADMIN — POLYETHYLENE GLYCOL 3350 17 G: 17 POWDER, FOR SOLUTION ORAL at 09:37

## 2021-02-09 RX ADMIN — HEPARIN SODIUM 10 UNITS/KG/HR: 10000 INJECTION, SOLUTION INTRAVENOUS at 02:12

## 2021-02-09 RX ADMIN — LEVETIRACETAM 750 MG: 100 SOLUTION ORAL at 09:38

## 2021-02-09 RX ADMIN — POLYETHYLENE GLYCOL 3350 17 G: 17 POWDER, FOR SOLUTION ORAL at 20:00

## 2021-02-09 RX ADMIN — SERTRALINE 75 MG: 25 TABLET, FILM COATED ORAL at 09:37

## 2021-02-09 RX ADMIN — ASPIRIN 81 MG CHEWABLE TABLET 81 MG: 81 TABLET CHEWABLE at 09:37

## 2021-02-09 RX ADMIN — Medication 2000 UNITS: at 09:37

## 2021-02-09 RX ADMIN — DESMOPRESSIN ACETATE 40 MG: 0.2 TABLET ORAL at 22:11

## 2021-02-09 RX ADMIN — DOCUSATE SODIUM AND SENNOSIDES 1 TABLET: 8.6; 5 TABLET, FILM COATED ORAL at 17:50

## 2021-02-09 RX ADMIN — Medication 20 MG: at 04:00

## 2021-02-09 RX ADMIN — MIDODRINE HYDROCHLORIDE 5 MG: 5 TABLET ORAL at 07:27

## 2021-02-09 RX ADMIN — Medication 50 MCG/HR: at 16:13

## 2021-02-09 RX ADMIN — MIDODRINE HYDROCHLORIDE 5 MG: 5 TABLET ORAL at 14:23

## 2021-02-09 RX ADMIN — DEXAMETHASONE SODIUM PHOSPHATE 4 MG: 4 INJECTION, SOLUTION INTRA-ARTICULAR; INTRALESIONAL; INTRAMUSCULAR; INTRAVENOUS; SOFT TISSUE at 09:39

## 2021-02-09 RX ADMIN — SERTRALINE HYDROCHLORIDE 50 MG: 50 TABLET ORAL at 22:11

## 2021-02-09 RX ADMIN — HEPARIN SODIUM 12 UNITS/KG/HR: 10000 INJECTION, SOLUTION INTRAVENOUS at 16:13

## 2021-02-09 RX ADMIN — CHLORHEXIDINE GLUCONATE 0.12% ORAL RINSE 15 ML: 1.2 LIQUID ORAL at 20:00

## 2021-02-09 RX ADMIN — Medication 20 MG: at 14:23

## 2021-02-09 RX ADMIN — ERTAPENEM SODIUM 1000 MG: 1 INJECTION, POWDER, LYOPHILIZED, FOR SOLUTION INTRAMUSCULAR; INTRAVENOUS at 11:34

## 2021-02-09 RX ADMIN — Medication 6 MG: at 22:11

## 2021-02-09 RX ADMIN — FUROSEMIDE 40 MG: 10 INJECTION, SOLUTION INTRAMUSCULAR; INTRAVENOUS at 11:35

## 2021-02-09 RX ADMIN — FENTANYL CITRATE 50 MCG: 50 INJECTION INTRAMUSCULAR; INTRAVENOUS at 19:59

## 2021-02-09 RX ADMIN — FENTANYL CITRATE 50 MCG: 50 INJECTION INTRAMUSCULAR; INTRAVENOUS at 22:14

## 2021-02-09 RX ADMIN — CHLORHEXIDINE GLUCONATE 0.12% ORAL RINSE 15 ML: 1.2 LIQUID ORAL at 09:38

## 2021-02-09 RX ADMIN — MIDODRINE HYDROCHLORIDE 5 MG: 5 TABLET ORAL at 22:11

## 2021-02-09 RX ADMIN — DOCUSATE SODIUM AND SENNOSIDES 1 TABLET: 8.6; 5 TABLET, FILM COATED ORAL at 09:37

## 2021-02-09 RX ADMIN — HEPARIN SODIUM 5000 UNITS: 1000 INJECTION INTRAVENOUS; SUBCUTANEOUS at 05:28

## 2021-02-09 RX ADMIN — MULTIVITAMIN 15 ML: LIQUID ORAL at 09:38

## 2021-02-09 NOTE — PROGRESS NOTES
NEPHROLOGY PROGRESS NOTE   Jean Carlos Guerra 63 y o  male MRN: [id number]  Unit/Bed#: ICU 08 Encounter: [id number]  Reason for Consult: PATRICK/CKD    ASSESSMENT/PLAN:  1  Acute Kidney Injury, POA- secondary to COVID 19 cytokine release + contrast induced nephropathy (1/17) +/- vancomycin toxicity  - UA: large blood, innumerable RBC, fine and coarse granular casts, 2+ protein  - diuresing well with lasix 40mg IV BID, held yesterdays afternoon dose  - ICU team plans to restart daily IV lasix dosing today  - good urine output, monitor  - creatinine stable around 1 5 now  - avoid hypotension, avoid nephrotoxins  2  Chronic Kidney Disease stage III- Baseline creatinine 1 1-1 3  May have higher baseline creatinine upon discharge  Follows with a nephrolog[facility] in the [facility]  3  Hypokalemia- resolved  4  Hypotension- off pressors now per ICU team  5  Volume Status- difficult examination from outside room  - ICU team to restart daily IV lasix   6  Hypernatremia, borderline- increase free water flushes with tube feeds  7  Anemia- hgb stable around 10 past few days  8  COVID 19- severe pathway per critical care team  - currently intubated    Disposition:  Diuretics per ICU team   Increase free water flushes with tube feeds  Discussed with ICU attending and ICU nurse  SUBJECTIVE:  Patient is intubated but awake  OBJECTIVE:  Current Weight: Weight - Scale: (!) 164 kg (361 lb 8 9 oz)  Vitals:    02/09/21 0713 02/09/21 0800 02/09/21 0844 02/09/21 0900   BP:       BP Location:       Pulse:  98  (!) 106   Resp:  22  22   Temp:  97 7 °F (36 5 °C)  97 7 °F (36 5 °C)   TempSrc:       SpO2: 98% 94%  94%   Weight:       Height:   5' 11" (1 803 m)        Intake/Output Summary (Last 24 hours) at 2/9/2021 1033  Last data filed at 2/9/2021 0800  Gross per 24 hour   Intake 1129 52 ml   Output 900 ml   Net 229 52 ml     Due to COVID 19 infection, I examined the patient from outside his room through the glass door    I discussed his case with the ICU team     General: NAD, obese  Skin: no rash  Respiratory: intubated, not labored  Cardiac: tachycardic at times on monitor  Extremities: + edema  GI: not distended  Neuro: alert awake    Medications:    Current Facility-Administered Medications:     acetaminophen (TYLENOL) oral suspension 650 mg, 650 mg, Per NG Tube, Q4H PRN, Rupalisph Curt, PA-C, 650 mg at 02/08/21 1734    aspirin chewable tablet 81 mg, 81 mg, Oral, Daily, KATHY Veronica, 81 mg at 02/09/21 4903    atorvastatin (LIPITOR) tablet 40 mg, 40 mg, Per NG Tube, HS, Rupalisph Curt, PA-C, 40 mg at 02/08/21 2228    bisacodyl (DULCOLAX) rectal suppository 10 mg, 10 mg, Rectal, Daily PRN, Rupalisph Curt, PA-C    chlorhexidine (PERIDEX) 0 12 % oral rinse 15 mL, 15 mL, Swish & Spit, Q12H [address], Josph Ripa, PA-C, 15 mL at 02/09/21 8202    cholecalciferol (VITAMIN D3) tablet 2,000 Units, 2,000 Units, Per NG Tube, Daily, Rupalisph Curt, PA-C, 2,000 Units at 02/09/21 0937    dexamethasone (DECADRON) injection 4 mg, 4 mg, Intravenous, Daily, KATHY Veronica, 4 mg at 02/09/21 9572    ertapenem (INVanz) 1,000 mg in sodium chloride 0 9 % 50 mL IVPB, 1,000 mg, Intravenous, Q24H, Brian Juarez PA-C, Last Rate: 100 mL/hr at 02/08/21 0837, 1,000 mg at 02/08/21 0837    fentaNYL 1000 mcg in sodium chloride 0 9% 100mL infusion, 50 mcg/hr, Intravenous, Continuous, KATHY Veronica, Last Rate: 5 mL/hr at 02/08/21 1937, 50 mcg/hr at 02/08/21 1937    fentanyl citrate (PF) 100 MCG/2ML 50 mcg, 50 mcg, Intravenous, Q1H PRN, KATHY Veronica, 50 mcg at 02/08/21 2030    heparin (porcine) 25,000 units in 0 45% NaCl 250 mL infusion (premix), 3-30 Units/kg/hr (Order-Specific), Intravenous, Titrated, Edwin Dubin, , Last Rate: 15 mL/hr at 02/09/21 0528, 12 Units/kg/hr at 02/09/21 0528    heparin (porcine) injection 10,000 Units, 10,000 Units, Intravenous, Q1H PRN, Edwin Dubin, DO    heparin (porcine) injection 5,000 Units, 5,000 Units, Intravenous, Q1H PRN, Rhesa Rhyme, DO, 5,000 Units at 02/09/21 0528    insulin lispro (HumaLOG) 100 units/mL subcutaneous injection 1-6 Units, 1-6 Units, Subcutaneous, Q6H [facility], 1 Units at 02/07/21 0608 **AND** Fingerstick Glucose (POCT), , , Q6H, KATHY Domingo    levETIRAcetam (KEPPRA) oral solution 750 mg, 750 mg, Oral, Q12H ARACELIS, KATHY Domingo, 750 mg at 02/09/21 7990    melatonin tablet 6 mg, 6 mg, Oral, HS, KATHY Morrow, 6 mg at 02/08/21 2227    midodrine (PROAMATINE) tablet 5 mg, 5 mg, Oral, Q8H, KATHY Domingo, 5 mg at 02/09/21 0727    [COMPLETED] zinc sulfate (ZINCATE) capsule 220 mg, 220 mg, Per NG Tube, Daily, 220 mg at 01/24/21 0824 **FOLLOWED BY** multivitamin with iron-minerals liquid 15 mL, 15 mL, Per NG Tube, Daily, Jennifer Hinton PA-C, 15 mL at 02/09/21 6371    omeprazole (PRILOSEC) suspension 2 mg/mL, 20 mg, Oral, Q12H, KATHY Domingo, 20 mg at 02/09/21 0400    polyethylene glycol (MIRALAX) packet 17 g, 17 g, Oral, BID, Jennifer Hinton PA-C, 17 g at 02/09/21 3989    senna-docusate sodium (SENOKOT S) 8 6-50 mg per tablet 1 tablet, 1 tablet, Per NG Tube, BID, KATHY Hdz, 1 tablet at 02/09/21 3067    sertraline (ZOLOFT) tablet 50 mg, 50 mg, Oral, HS, KATHY Domingo, 50 mg at 02/08/21 2227    sertraline (ZOLOFT) tablet 75 mg, 75 mg, Oral, Daily, KATHY Hdz, 75 mg at 02/09/21 1448    Laboratory Results:  Results from last 7 days   Lab Units 02/09/21  0434 02/08/21  0449 02/07/21  0332 02/06/21  0414 02/05/21  0525 02/04/21  0459 02/03/21  0504 02/03/21  0457   WBC Thousand/uL 10 37* 12 93* 14 19* 15 08* 21 84* 24 84* 15 39*  --    HEMOGLOBIN g/dL 9 8* 10 1* 9 9* 9 7* 10 3* 11 8* 12 0  --    HEMATOCRIT % 34 2* 35 6* 34 4* 32 8* 34 7* 38 9 41 6  --    PLATELETS Thousands/uL 194 234 213 181 196 232 224  --    POTASSIUM mmol/L 3 8 4 2 3 8 3 5 3 5 3 7 --  4 0   CHLORIDE mmol/L 107 106 102 105 106 107  --  104   CO2 mmol/L 32 33* 32 32 32 30  --  35*   BUN mg/dL 64* 60* 59* 52* 54* 59*  --  60*   CREATININE mg/dL 1 49* 1 53* 1 48* 1 50* 1 68* 1 86*  --  1 86*   CALCIUM mg/dL 8 6 8 1* 8 1* 7 9* 7 7* 8 1*  --  8 4   MAGNESIUM mg/dL 2 6 2 4 2 5 1 9  --  1 6  --  1 6   PHOSPHORUS mg/dL  --  4 6* 4 1 4 5*  --  3 1  --  2 7

## 2021-02-09 NOTE — NUTRITION
COVID-19+, pt remains intubated  Propofol d/c  PATRICK on CKD, Creat stable, undergoing diuresis  Since pt no longer receiving additional calories from propofol, recommend TF goal Jevity 1 5 @ 60 ml/hr with 1 packet of Prosource TF daily to better meet nutritional needs  Goal will provide 2200 kcal (13 kcal/kg actual wt, 28 kcal/kg IBW), 103g protein (1 3 g/kg IBW), 1094 ml free water  Additional free water flushes per Nephrology, noted plan to increase free water flushes       Will continue to monitor labs for need for adjustment to TF

## 2021-02-09 NOTE — PROGRESS NOTES
Progress Note - Kanu Johnson 1960, 63 y o  male MRN: [id number]    Unit/Bed#: ICU 08 Encounter: [id number]    Primary Care Provider: Noah Chaudhary DO   Date and time admitted to hospital: 1/17/2021  4:16 PM    * COVID-19  Assessment & Plan  · Confirmed positive 1/17  · Continue Decadron changed to 4mg/daily   · VC/Zinc completed   Continue MV  · Continue statin  · Continue pepcid   · Actemra given 1/28  · Conv Plasma 1/17  · Cont Heparin drip  · Not candidate for remdesivir  · Cont Ertapenum for superimposed ESBL E choli bacterial pneumonia    Pneumonia due to Escherichia coli [facility])  Assessment & Plan  · ID following  · ESBL cont on Ertapenum  · Wean vent as tolerated  · See above plan    Acute respiratory failure with hypoxia and hypercarbia (HCC)  Assessment & Plan  · Secondary to COVID-19 and superimposed ESBL echoli pneumonia   · Previously he had been intubated on 1/17 and extubated on 1/18, reintubated 2/1  · Vent day #9- Wean vent setting as tolerated currently on AC/VC 18/ 450/ 60%/ 6  · Titrate FiO2 for SpO2 > 90%  · Covid therapy per severe pathway protocol   · ABG as needed      Acute kidney injury superimposed on CKD [facility])  Assessment & Plan  Lab Results   Component Value Date    EGFR 50 02/09/2021    EGFR 48 02/08/2021    EGFR 50 02/07/2021    CREATININE 1 49 (H) 02/09/2021    CREATININE 1 53 (H) 02/08/2021    CREATININE 1 48 (H) 02/07/2021     · Creatine improved to 1 49 today   · Diureses as needed  · Renal following  · Strict I/O  · Avoid nephrotoxic meds    Acute metabolic encephalopathy  Assessment & Plan  · Secondary to hypoxia/hypercapnia and history of CVA with rAttempt to wean sedation and monitor for seizure activity   · Neurology consulted   · EEG- no seizure activity   · CAM-ICU   · Continue Zoloft  · Continue Fentanyl for sedation but patient is awake and following commands  · Overall seems improved    Seizure ([address] )  Assessment & Plan  · Patient does not have a seizure history but does have history of CVA in the past  · Neurology consulted and appreciate input, have signed off at this time  · Intubated 2/1 for suspected seizure activity   · Loaded with 2G Keppra and continued on Keppra 750mg BID   · EEG shows no seizure activity    Bacteremia  Assessment & Plan  · Repeat BC negative for growth  · Resolved    Dysphagia  Assessment & Plan  · SLP following and had recommend pureed diet prior to intubation  · Will need to reassess after extubation    Anemia  Assessment & Plan  · No active bleeding  · Check Hgb as needed    History of stroke  Assessment & Plan  · On Heparin drip  · No clinical evidence of new stroke    Atrial fibrillation ([address] )  Assessment & Plan  · Currently in sinus rhythm      · Not on AC at home secondary to history of GI bleed  · Cont heparin gtt secondary to elevated D dimer  · Holding cardizem secondary to hypotension  · If BP improved need to consider restart Lopressor    · [id number]    Morbid obesity   Assessment & Plan  · Likely contributing to ANGELINA  · Nutrition c/s when appropriate    Hyperlipidemia  Assessment & Plan  · Continue atorvastatin     Essential hypertension  Assessment & Plan  · Continue home meds when appropriate  · Current BP stable      Depression  Assessment & Plan  · Continue home Zoloft      ----------------------------------------------------------------------------------------  HPI/24hr events: No acute events overnight; this am he is awake and following commands denies any c/o abd or chest pain, indicates his throat hurts; moving all 4 extremities; tolerating current vent suuport    Disposition: Continue Critical Care   Code Status: Level 1 - Full Code  ---------------------------------------------------------------------------------------  SUBJECTIVE    Review of Systems   Unable to perform ROS: Intubated     ---------------------------------------------------------------------------------------  OBJECTIVE    Vitals   Vitals:    02/09/21 0600 21 0700 21 0713 21 0844   BP:       BP Location:       Pulse:  94     Resp:  22     Temp:  97 7 °F (36 5 °C)     TempSrc:       SpO2:  94% 98%    Weight: (!) 164 kg (361 lb 8 9 oz)      Height:    5' 11" (1 803 m)     Temp (24hrs), Av 4 °F (36 3 °C), Min:96 6 °F (35 9 °C), Max:98 2 °F (36 8 °C)  Current: Temperature: 97 7 °F (36 5 °C)  Arterial Line BP: 111/59  Arterial Line MAP (mmHg): 75 mmHg    Respiratory:  SpO2: SpO2: 98 %, SpO2 Device: O2 Device: Ventilator  Nasal Cannula O2 Flow Rate (L/min): 50 L/min    Invasive/non-invasive ventilation settings   Respiratory    Lab Data (Last 4 hours)      733            pH, Arterial       7 389           pCO2, Arterial       54 0           pO2, Arterial       59 9           HCO3, Arterial       31 9           Base Excess, Arterial       5 8                O2/Vent Data       713   Most Recent        Patient safety screen outcome: Failed  Failed                  Physical Exam  Vitals signs and nursing note reviewed  Constitutional:       General: He is not in acute distress  Appearance: He is obese  HENT:      Head: Normocephalic and atraumatic  Eyes:      Extraocular Movements: Extraocular movements intact  Pupils: Pupils are equal, round, and reactive to light  Cardiovascular:      Rate and Rhythm: Regular rhythm  Tachycardia present  Pulmonary:      Effort: Pulmonary effort is normal  No respiratory distress  Breath sounds: Examination of the right-lower field reveals decreased breath sounds  Examination of the left-lower field reveals decreased breath sounds  Decreased breath sounds present  Abdominal:      General: Bowel sounds are normal  There is no distension  Palpations: Abdomen is soft  Tenderness: There is no abdominal tenderness  Musculoskeletal: Normal range of motion  Right lower leg: Edema present  Left lower leg: Edema present  Skin:     General: Skin is warm and dry  Capillary Refill: Capillary refill takes less than 2 seconds  Neurological:      General: No focal deficit present  Mental Status: He is alert           Laboratory and Diagnostics:  Results from last 7 days   Lab Units 02/09/21 0434 02/08/21 0449 02/07/21 0332 02/06/21 0414 02/05/21  0525 02/04/21  0459 02/03/21  0504   WBC Thousand/uL 10 37* 12 93* 14 19* 15 08* 21 84* 24 84* 15 39*   HEMOGLOBIN g/dL 9 8* 10 1* 9 9* 9 7* 10 3* 11 8* 12 0   HEMATOCRIT % 34 2* 35 6* 34 4* 32 8* 34 7* 38 9 41 6   PLATELETS Thousands/uL 194 234 213 181 196 232 224   NEUTROS PCT % 68  --   --  92*  --  91*  --    BANDS PCT %  --  2 6  --   --   --  2   MONOS PCT % 10  --   --  3*  --  3*  --    MONO PCT %  --  7 3*  --  1*  --  3*     Results from last 7 days   Lab Units 02/09/21 0434 02/08/21 0449 02/07/21 0332 02/06/21 0414 02/05/21  0525 02/04/21  0459 02/03/21  0457   SODIUM mmol/L 145 144 142 143 143 144 144   POTASSIUM mmol/L 3 8 4 2 3 8 3 5 3 5 3 7 4 0   CHLORIDE mmol/L 107 106 102 105 106 107 104   CO2 mmol/L 32 33* 32 32 32 30 35*   ANION GAP mmol/L 6 5 8 6 5 7 5   BUN mg/dL 64* 60* 59* 52* 54* 59* 60*   CREATININE mg/dL 1 49* 1 53* 1 48* 1 50* 1 68* 1 86* 1 86*   CALCIUM mg/dL 8 6 8 1* 8 1* 7 9* 7 7* 8 1* 8 4   GLUCOSE RANDOM mg/dL 122 123 181* 176* 147* 144* 101   ALT U/L  --   --   --   --  19 16 20   AST U/L  --   --   --   --  19 13 16   ALK PHOS U/L  --   --   --   --  67 63 64   ALBUMIN g/dL  --   --   --   --  2 3* 2 8* 3 1*   TOTAL BILIRUBIN mg/dL  --   --   --   --  0 54 0 60 0 58     Results from last 7 days   Lab Units 02/09/21  0434 02/08/21  0449 02/07/21  0332 02/06/21  0414 02/04/21  0459 02/03/21  0457   MAGNESIUM mg/dL 2 6 2 4 2 5 1 9 1 6 1 6   PHOSPHORUS mg/dL  --  4 6* 4 1 4 5* 3 1 2 7      Results from last 7 days   Lab Units 02/09/21  0434 02/08/21  2228 02/08/21  1405 02/08/21  0449 02/07/21  0344 02/06/21  1911 02/06/21  1327  02/02/21  1250   INR   --   --   --   --   --   --   --   -- 1 22*   PTT seconds 59* 68* 94* 52* 68* 60* 66*   < > 31    < > = values in this interval not displayed  Results from last 7 days   Lab Units 02/07/21  0332   LACTIC ACID mmol/L 0 8     ABG:  Results from last 7 days   Lab Units 02/09/21  0733   PH ART  7 389   PCO2 ART mm Hg 54 0*   PO2 ART mm Hg 59 9*   HCO3 ART mmol/L 31 9*   BASE EXC ART mmol/L 5 8   ABG SOURCE  Line, Arterial     VBG:  Results from last 7 days   Lab Units 02/09/21  0733   ABG SOURCE  Line, Arterial     Results from last 7 days   Lab Units 02/07/21  0331 02/06/21  0414 02/04/21  0459 02/03/21  0457   PROCALCITONIN ng/ml 0 45* 0 96* 0 38* 0 45*       Micro  Results from last 7 days   Lab Units 02/05/21  2104 02/04/21  1703 02/04/21  1220   BLOOD CULTURE   --   --  No Growth After 4 Days  No Growth After 4 Days  SPUTUM CULTURE   --  Few Colonies of Escherichia coli ESBL*  Few Colonies of   --    GRAM STAIN RESULT   --  1+ Polys  No bacteria seen  --    MRSA CULTURE ONLY  No Methicillin Resistant Staphlyococcus aureus (MRSA) isolated  --   --        EKG: Sinus tachycardia  Imaging: I have personally reviewed pertinent films in PACS    Intake and Output  I/O       02/07 0701 - 02/08 0700 02/08 0701 - 02/09 0700 02/09 0701 - 02/10 0700    P  O  0      I V  (mL/kg) 1047 4 (6 4) 329 8 (2)     NG/ 320     IV Piggyback 50      Feedings 1103 630     Total Intake(mL/kg) 2650 4 (16 2) 1279 8 (7 8)     Urine (mL/kg/hr) 3405 (0 9) 1245 (0 3)     Emesis/NG output 0      Other       Stool 0      Total Output 3405 1245     Net -754 6 +34 8            Unmeasured Stool Occurrence 3 x            Height and Weights   Height: 5' 11" (180 3 cm)  IBW: 75 3 kg  Body mass index is 50 43 kg/m²    Weight (last 2 days)     Date/Time   Weight    02/09/21 0600   (!) 164 (361 55)    02/08/21 0600   (!) 164 (362)    02/07/21 0600   (!) 166 (025 96)                Nutrition       Diet Orders   (From admission, onward)             Start     Ordered    02/06/21 1432  Diet Enteral/Parenteral; Tube Feeding No Oral Diet; Jevity 1 5; Continuous; 45; Prosource Protein Liquid - Two Packets  Diet effective now     Question Answer Comment   Diet Type Enteral/Parenteral    Enteral/Parenteral Tube Feeding No Oral Diet    Tube Feeding Formula: Jevity 1 5    Bolus/Cyclic/Continuous Continuous    Tube Feeding Goal Rate (mL/hr): 45    Prosource Protein Liquid - No Carb Prosource Protein Liquid - Two Packets    RD to adjust diet per protocol?  Yes        02/06/21 1431    01/17/21 1719  Room Service  Once     Question:  Type of Service  Answer:  Room Service- Not Appropriate    01/17/21 1718                  Active Medications  Scheduled Meds:  Current Facility-Administered Medications   Medication Dose Route Frequency Provider Last Rate    acetaminophen  650 mg Per NG Tube Q4H PRN [place] HERBERT Ashton      aspirin  81 mg Oral Daily KATHY Casanova      atorvastatin  40 mg Per NG Tube HS [place] HERBERT Ashton      bisacodyl  10 mg Rectal Daily PRN Champ HERBERT Ashton      chlorhexidine  15 mL Swish & Spit Q12H [address] [place], [place]      cholecalciferol  2,000 Units Per NG Tube Daily Abram Ashton PA-C      dexamethasone  4 mg Intravenous Daily KATHY Casanova      ertapenem  1,000 mg Intravenous Q24H Tiffany Ceja PA-C 1,000 mg (02/08/21 0837)    fentaNYL  50 mcg/hr Intravenous Continuous KATHY Casanova 50 mcg/hr (02/08/21 1937)    fentanyl citrate (PF)  50 mcg Intravenous Q1H PRN KATHY Casanova      heparin (porcine)  3-30 Units/kg/hr (Order-Specific) Intravenous Titrated Sukhjinder Chaudhari DO 12 Units/kg/hr (02/09/21 0528)    heparin (porcine)  10,000 Units Intravenous Q1H PRN Sukhjinder Chaudhari, DO      heparin (porcine)  5,000 Units Intravenous Q1H PRN Sukhjinder Chaudhari,       insulin lispro  1-6 Units Subcutaneous Q6H [address] KATHY Casanova      levETIRAcetam  750 mg Oral Q12H [address] KATHY Casanova      melatonin  6 mg Oral HS KATHY Gilliland      midodrine  5 mg Oral [address] [place], CRNP      multivitamin with iron-minerals  15 mL Per NG Tube Daily Paula Jacobo PA-C      omeprazole (PRILOSEC) suspension 2 mg/mL  20 mg Oral Q12H KATHY Gilliland      polyethylene glycol  17 g Oral BID Paula Jacobo PA-C      senna-docusate sodium  1 tablet Per NG Tube BID CalosKATHY Brown      sertraline  50 mg Oral HS Caloskaren Al, KATHY      sertraline  75 mg Oral Daily KATHY Gilliland       Continuous Infusions:  fentaNYL, 50 mcg/hr, Last Rate: 50 mcg/hr (02/08/21 1937)  heparin (porcine), 3-30 Units/kg/hr (Order-Specific), Last Rate: 12 Units/kg/hr (02/09/21 0528)      PRN Meds:   acetaminophen, 650 mg, Q4H PRN  bisacodyl, 10 mg, Daily PRN  fentanyl citrate (PF), 50 mcg, Q1H PRN  heparin (porcine), 10,000 Units, Q1H PRN  heparin (porcine), 5,000 Units, Q1H PRN        Invasive Devices Review  Invasive Devices     Peripheral Intravenous Line            Long-Dwell Peripheral IV (Midline) 60/78/15 Left Cephalic 14 days    Peripheral IV 02/05/21 Right;Ventral (anterior) Arm 3 days    Peripheral IV 02/06/21 Left;Ventral (anterior) Forearm 3 days          Arterial Line            Arterial Line 02/04/21 Radial 5 days          Drain            Urethral Catheter Straight-tip 16 Fr  22 days    NG/OG/Enteral Tube Orogastric 7 days          Airway            ETT  Cuffed; Hi-Lo 8 mm 7 days                Rationale for remaining devices: Critical illness  ---------------------------------------------------------------------------------------  Advance Directive and Living Will:      Power of :    POLST:    ---------------------------------------------------------------------------------------  Care Time Delivered:   Upon my evaluation, this patient had a high probability of imminent or life-threatening deterioration due to resp failure, which required my direct attention, intervention, and personal management  I have personally provided 35 minutes ([phone] to [phone]) of critical care time, exclusive of procedures, teaching, family meetings, and any prior time recorded by providers other than myself         KATHY Quintero

## 2021-02-09 NOTE — CASE MANAGEMENT
Patient remains medically unstable for D/C at this time  Patient remains intubated on AC/VC 18/ 450/ 60%/ 6  Per critical care team they are weaning the vent as tolerated  Patient remains on a heprin drip  Patient will need PT/OT evaluations when medically appropriate  CM dept will continue to follow for discharge planning needs

## 2021-02-10 LAB
ANION GAP SERPL CALCULATED.3IONS-SCNC: 5 MMOL/L (ref 4–13)
ANION GAP SERPL CALCULATED.3IONS-SCNC: 6 MMOL/L (ref 4–13)
ANISOCYTOSIS BLD QL SMEAR: PRESENT
APTT PPP: 72 SECONDS (ref 23–37)
BASO STIPL BLD QL SMEAR: PRESENT
BASOPHILS # BLD MANUAL: 0.1 THOUSAND/UL (ref 0–0.1)
BASOPHILS NFR MAR MANUAL: 1 % (ref 0–1)
BUN SERPL-MCNC: 71 MG/DL (ref 5–25)
BUN SERPL-MCNC: 72 MG/DL (ref 5–25)
CALCIUM SERPL-MCNC: 8.3 MG/DL (ref 8.3–10.1)
CALCIUM SERPL-MCNC: 8.5 MG/DL (ref 8.3–10.1)
CHLORIDE SERPL-SCNC: 105 MMOL/L (ref 100–108)
CHLORIDE SERPL-SCNC: 107 MMOL/L (ref 100–108)
CO2 SERPL-SCNC: 33 MMOL/L (ref 21–32)
CO2 SERPL-SCNC: 34 MMOL/L (ref 21–32)
CREAT SERPL-MCNC: 1.62 MG/DL (ref 0.6–1.3)
CREAT SERPL-MCNC: 1.63 MG/DL (ref 0.6–1.3)
EOSINOPHIL # BLD MANUAL: 1.87 THOUSAND/UL (ref 0–0.4)
EOSINOPHIL NFR BLD MANUAL: 19 % (ref 0–6)
ERYTHROCYTE [DISTWIDTH] IN BLOOD BY AUTOMATED COUNT: 22.5 % (ref 11.6–15.1)
GFR SERPL CREATININE-BSD FRML MDRD: 45 ML/MIN/1.73SQ M
GFR SERPL CREATININE-BSD FRML MDRD: 45 ML/MIN/1.73SQ M
GLUCOSE SERPL-MCNC: 116 MG/DL (ref 65–140)
GLUCOSE SERPL-MCNC: 117 MG/DL (ref 65–140)
GLUCOSE SERPL-MCNC: 125 MG/DL (ref 65–140)
GLUCOSE SERPL-MCNC: 132 MG/DL (ref 65–140)
GLUCOSE SERPL-MCNC: 144 MG/DL (ref 65–140)
GLUCOSE SERPL-MCNC: 148 MG/DL (ref 65–140)
HCT VFR BLD AUTO: 33.5 % (ref 36.5–49.3)
HGB BLD-MCNC: 9.6 G/DL (ref 12–17)
LYMPHOCYTES # BLD AUTO: 0.79 THOUSAND/UL (ref 0.6–4.47)
LYMPHOCYTES # BLD AUTO: 8 % (ref 14–44)
MAGNESIUM SERPL-MCNC: 2.5 MG/DL (ref 1.6–2.6)
MAGNESIUM SERPL-MCNC: 2.6 MG/DL (ref 1.6–2.6)
MCH RBC QN AUTO: 24.1 PG (ref 26.8–34.3)
MCHC RBC AUTO-ENTMCNC: 28.7 G/DL (ref 31.4–37.4)
MCV RBC AUTO: 84 FL (ref 82–98)
METAMYELOCYTES NFR BLD MANUAL: 1 % (ref 0–1)
MONOCYTES # BLD AUTO: 0.99 THOUSAND/UL (ref 0–1.22)
MONOCYTES NFR BLD: 10 % (ref 4–12)
MYELOCYTES NFR BLD MANUAL: 1 % (ref 0–1)
NEUTROPHILS # BLD MANUAL: 5.71 THOUSAND/UL (ref 1.85–7.62)
NEUTS BAND NFR BLD MANUAL: 4 % (ref 0–8)
NEUTS SEG NFR BLD AUTO: 54 % (ref 43–75)
NRBC BLD AUTO-RTO: 0 /100 WBCS
NRBC BLD AUTO-RTO: 1 /100 WBC (ref 0–2)
OVALOCYTES BLD QL SMEAR: PRESENT
PHOSPHATE SERPL-MCNC: 4.5 MG/DL (ref 2.3–4.1)
PLATELET # BLD AUTO: 201 THOUSANDS/UL (ref 149–390)
PLATELET BLD QL SMEAR: ADEQUATE
PMV BLD AUTO: 11.6 FL (ref 8.9–12.7)
POIKILOCYTOSIS BLD QL SMEAR: PRESENT
POLYCHROMASIA BLD QL SMEAR: PRESENT
POTASSIUM SERPL-SCNC: 3.1 MMOL/L (ref 3.5–5.3)
POTASSIUM SERPL-SCNC: 3.5 MMOL/L (ref 3.5–5.3)
RBC # BLD AUTO: 3.99 MILLION/UL (ref 3.88–5.62)
SODIUM SERPL-SCNC: 144 MMOL/L (ref 136–145)
SODIUM SERPL-SCNC: 146 MMOL/L (ref 136–145)
TOTAL CELLS COUNTED SPEC: 100
VARIANT LYMPHS # BLD AUTO: 2 %
WBC # BLD AUTO: 9.85 THOUSAND/UL (ref 4.31–10.16)

## 2021-02-10 PROCEDURE — 80048 BASIC METABOLIC PNL TOTAL CA: CPT | Performed by: NURSE PRACTITIONER

## 2021-02-10 PROCEDURE — 84100 ASSAY OF PHOSPHORUS: CPT | Performed by: NURSE PRACTITIONER

## 2021-02-10 PROCEDURE — 94003 VENT MGMT INPAT SUBQ DAY: CPT

## 2021-02-10 PROCEDURE — 82948 REAGENT STRIP/BLOOD GLUCOSE: CPT

## 2021-02-10 PROCEDURE — 99291 CRITICAL CARE FIRST HOUR: CPT | Performed by: NURSE PRACTITIONER

## 2021-02-10 PROCEDURE — 80048 BASIC METABOLIC PNL TOTAL CA: CPT | Performed by: PHYSICIAN ASSISTANT

## 2021-02-10 PROCEDURE — 83735 ASSAY OF MAGNESIUM: CPT | Performed by: NURSE PRACTITIONER

## 2021-02-10 PROCEDURE — 85007 BL SMEAR W/DIFF WBC COUNT: CPT | Performed by: NURSE PRACTITIONER

## 2021-02-10 PROCEDURE — 85730 THROMBOPLASTIN TIME PARTIAL: CPT | Performed by: NURSE PRACTITIONER

## 2021-02-10 PROCEDURE — 85027 COMPLETE CBC AUTOMATED: CPT | Performed by: NURSE PRACTITIONER

## 2021-02-10 PROCEDURE — 83735 ASSAY OF MAGNESIUM: CPT | Performed by: PHYSICIAN ASSISTANT

## 2021-02-10 PROCEDURE — 99233 SBSQ HOSP IP/OBS HIGH 50: CPT | Performed by: INTERNAL MEDICINE

## 2021-02-10 PROCEDURE — 94760 N-INVAS EAR/PLS OXIMETRY 1: CPT

## 2021-02-10 PROCEDURE — 94150 VITAL CAPACITY TEST: CPT

## 2021-02-10 RX ORDER — POTASSIUM CHLORIDE 20MEQ/15ML
40 LIQUID (ML) ORAL ONCE
Status: COMPLETED | OUTPATIENT
Start: 2021-02-10 | End: 2021-02-10

## 2021-02-10 RX ORDER — MAGNESIUM SULFATE HEPTAHYDRATE 40 MG/ML
2 INJECTION, SOLUTION INTRAVENOUS ONCE
Status: COMPLETED | OUTPATIENT
Start: 2021-02-10 | End: 2021-02-10

## 2021-02-10 RX ORDER — POTASSIUM CHLORIDE 14.9 MG/ML
20 INJECTION INTRAVENOUS ONCE
Status: COMPLETED | OUTPATIENT
Start: 2021-02-10 | End: 2021-02-10

## 2021-02-10 RX ADMIN — POLYETHYLENE GLYCOL 3350 17 G: 17 POWDER, FOR SOLUTION ORAL at 08:34

## 2021-02-10 RX ADMIN — FENTANYL CITRATE 50 MCG: 50 INJECTION INTRAMUSCULAR; INTRAVENOUS at 19:22

## 2021-02-10 RX ADMIN — Medication 20 MG: at 15:20

## 2021-02-10 RX ADMIN — FENTANYL CITRATE 50 MCG: 50 INJECTION INTRAMUSCULAR; INTRAVENOUS at 23:42

## 2021-02-10 RX ADMIN — DOCUSATE SODIUM AND SENNOSIDES 1 TABLET: 8.6; 5 TABLET, FILM COATED ORAL at 08:35

## 2021-02-10 RX ADMIN — MULTIVITAMIN 15 ML: LIQUID ORAL at 08:40

## 2021-02-10 RX ADMIN — LEVETIRACETAM 750 MG: 100 SOLUTION ORAL at 20:11

## 2021-02-10 RX ADMIN — ASPIRIN 81 MG CHEWABLE TABLET 81 MG: 81 TABLET CHEWABLE at 08:35

## 2021-02-10 RX ADMIN — POTASSIUM CHLORIDE 20 MEQ: 14.9 INJECTION, SOLUTION INTRAVENOUS at 05:05

## 2021-02-10 RX ADMIN — POTASSIUM CHLORIDE 20 MEQ: 14.9 INJECTION, SOLUTION INTRAVENOUS at 08:37

## 2021-02-10 RX ADMIN — Medication 50 MCG/HR: at 08:37

## 2021-02-10 RX ADMIN — ERTAPENEM SODIUM 1000 MG: 1 INJECTION, POWDER, LYOPHILIZED, FOR SOLUTION INTRAMUSCULAR; INTRAVENOUS at 08:40

## 2021-02-10 RX ADMIN — MIDODRINE HYDROCHLORIDE 5 MG: 5 TABLET ORAL at 08:35

## 2021-02-10 RX ADMIN — FENTANYL CITRATE 50 MCG: 50 INJECTION INTRAMUSCULAR; INTRAVENOUS at 01:34

## 2021-02-10 RX ADMIN — CHLORHEXIDINE GLUCONATE 0.12% ORAL RINSE 15 ML: 1.2 LIQUID ORAL at 08:34

## 2021-02-10 RX ADMIN — MIDODRINE HYDROCHLORIDE 5 MG: 5 TABLET ORAL at 23:41

## 2021-02-10 RX ADMIN — Medication 2000 UNITS: at 08:35

## 2021-02-10 RX ADMIN — CHLORHEXIDINE GLUCONATE 0.12% ORAL RINSE 15 ML: 1.2 LIQUID ORAL at 20:11

## 2021-02-10 RX ADMIN — Medication 6 MG: at 21:35

## 2021-02-10 RX ADMIN — DESMOPRESSIN ACETATE 40 MG: 0.2 TABLET ORAL at 21:35

## 2021-02-10 RX ADMIN — LEVETIRACETAM 750 MG: 100 SOLUTION ORAL at 08:40

## 2021-02-10 RX ADMIN — SERTRALINE 75 MG: 25 TABLET, FILM COATED ORAL at 08:35

## 2021-02-10 RX ADMIN — DEXAMETHASONE SODIUM PHOSPHATE 4 MG: 4 INJECTION, SOLUTION INTRA-ARTICULAR; INTRALESIONAL; INTRAMUSCULAR; INTRAVENOUS; SOFT TISSUE at 08:35

## 2021-02-10 RX ADMIN — FUROSEMIDE 40 MG: 10 INJECTION, SOLUTION INTRAMUSCULAR; INTRAVENOUS at 08:35

## 2021-02-10 RX ADMIN — Medication 50 MCG/HR: at 23:42

## 2021-02-10 RX ADMIN — SERTRALINE HYDROCHLORIDE 50 MG: 50 TABLET ORAL at 21:35

## 2021-02-10 RX ADMIN — POTASSIUM CHLORIDE 40 MEQ: 20 SOLUTION ORAL at 20:11

## 2021-02-10 RX ADMIN — MIDODRINE HYDROCHLORIDE 5 MG: 5 TABLET ORAL at 15:20

## 2021-02-10 RX ADMIN — FENTANYL CITRATE 50 MCG: 50 INJECTION INTRAMUSCULAR; INTRAVENOUS at 21:40

## 2021-02-10 RX ADMIN — FENTANYL CITRATE 50 MCG: 50 INJECTION INTRAMUSCULAR; INTRAVENOUS at 03:14

## 2021-02-10 RX ADMIN — HEPARIN SODIUM 12 UNITS/KG/HR: 10000 INJECTION, SOLUTION INTRAVENOUS at 08:34

## 2021-02-10 RX ADMIN — Medication 20 MG: at 03:47

## 2021-02-10 RX ADMIN — POTASSIUM CHLORIDE 40 MEQ: 20 SOLUTION ORAL at 05:06

## 2021-02-10 RX ADMIN — MAGNESIUM SULFATE HEPTAHYDRATE 2 G: 40 INJECTION, SOLUTION INTRAVENOUS at 20:11

## 2021-02-10 NOTE — ASSESSMENT & PLAN NOTE
· Secondary to hypoxia/hypercapnia and history of CVA with rAttempt to wean sedation and monitor for seizure activity   · Neurology consulted not additional intervention  · EEG- no seizure activity   · CAM-ICU   · Continue Zoloft  · Continue Fentanyl for sedation but patient is awake and following commands  · Overall still seems improved

## 2021-02-10 NOTE — ASSESSMENT & PLAN NOTE
· Secondary to COVID-19 and superimposed ESBL echoli pneumonia   · Previously he had been intubated on 1/17 and extubated on 1/18, reintubated 2/1  · Vent day #10- Wean vent setting as tolerated currently on AC/VC 18/ 450/ 60%/ 10- had attempt to wean Fio2 to 55% but back up to 60% overnight  · Titrate FiO2 for SpO2 > 90%  · Covid therapy per severe pathway protocol   · ABG as needed

## 2021-02-10 NOTE — ASSESSMENT & PLAN NOTE
· Confirmed positive 1/17  · Continue Decadron changed to 4mg/daily   · VC/Zinc completed   Continue MV  · Continue statin per protocol  · Continue pepcid   · Actemra given 1/28  · Conv Plasma 1/17  · Cont Heparin drip per protocol for elevated D dimer  · Not candidate for remdesivir  · Cont Ertapenum for superimposed ESBL E choli bacterial pneumonia

## 2021-02-10 NOTE — ASSESSMENT & PLAN NOTE
· Currently remains in sinus rhythm      · Not on AC at home secondary to history of GI bleed  · Cont heparin gtt secondary to elevated D dimer  · Holding cardizem secondary to hypotension  · If BP improved need to consider restart Lopressor    · [id number] 3

## 2021-02-10 NOTE — ASSESSMENT & PLAN NOTE
· Patient does not have a seizure history but does have history of CVA in the past  · Neurology consulted and appreciate input, have signed off at this time  · Intubated 2/1 for suspected seizure activity   · Loaded with 2G Keppra and continued on Keppra 750mg BID   · EEG shows no seizure activity- discuss with neuro need to continue Keppra

## 2021-02-10 NOTE — QUICK NOTE
Contacted Wife Dayne Ayala and provided daily update  No significant changes or updates to provide  She was very thankful for the call

## 2021-02-10 NOTE — PROGRESS NOTES
Progress Note - Joanna Lorenz 1960, 63 y o  male MRN: [id number]    Unit/Bed#: ICU 08 Encounter: [id number]    Primary Care Provider: Caryle Forth, DO   Date and time admitted to hospital: 1/17/2021  4:16 PM    * COVID-19  Assessment & Plan  · Confirmed positive 1/17  · Continue Decadron changed to 4mg/daily   · VC/Zinc completed   Continue MV  · Continue statin per protocol  · Continue pepcid   · Actemra given 1/28  · Conv Plasma 1/17  · Cont Heparin drip per protocol for elevated D dimer  · Not candidate for remdesivir  · Cont Ertapenum for superimposed ESBL E choli bacterial pneumonia    Pneumonia due to Escherichia coli [facility])  Assessment & Plan  · ID following  · ESBL cont on Ertapenum  · Wean vent as tolerated  · See above plan    Acute respiratory failure with hypoxia and hypercarbia (HCC)  Assessment & Plan  · Secondary to COVID-19 and superimposed ESBL echoli pneumonia   · Previously he had been intubated on 1/17 and extubated on 1/18, reintubated 2/1  · Vent day #10- Wean vent setting as tolerated currently on AC/VC 18/ 450/ 60%/ 10- had attempt to wean Fio2 to 55% but back up to 60% overnight  · Titrate FiO2 for SpO2 > 90%  · Covid therapy per severe pathway protocol   · ABG as needed      Acute kidney injury superimposed on CKD [facility])  Assessment & Plan  Lab Results   Component Value Date    EGFR 45 02/10/2021    EGFR 50 02/09/2021    EGFR 48 02/08/2021    CREATININE 1 62 (H) 02/10/2021    CREATININE 1 49 (H) 02/09/2021    CREATININE 1 53 (H) 02/08/2021     · Creatine improved to 1 62 today   · Diuresis as needed  · Renal following  · Cont Strict I/O  · Avoid nephrotoxic meds    Acute metabolic encephalopathy  Assessment & Plan  · Secondary to hypoxia/hypercapnia and history of CVA with rAttempt to wean sedation and monitor for seizure activity   · Neurology consulted not additional intervention  · EEG- no seizure activity   · CAM-ICU   · Continue Zoloft  · Continue Fentanyl for sedation but patient is awake and following commands  · Overall still seems improved    Seizure ([address] )  Assessment & Plan  · Patient does not have a seizure history but does have history of CVA in the past  · Neurology consulted and appreciate input, have signed off at this time  · Intubated 2/1 for suspected seizure activity   · Loaded with 2G Keppra and continued on Keppra 750mg BID   · EEG shows no seizure activity- discuss with neuro need to continue Keppra    Bacteremia  Assessment & Plan  · Repeat BC negative for growth  · Resolved    Dysphagia  Assessment & Plan  · SLP following and had recommend pureed diet prior to intubation  · Will need to reassess after extubation    Anemia  Assessment & Plan  · No active bleeding  · Check Hgb as needed  · Stable @ 9 6 today    History of stroke  Assessment & Plan  · On Heparin drip  · No clinical evidence of new stroke    Atrial fibrillation ([facility] [address] )  Assessment & Plan  · Currently remains in sinus rhythm      · Not on AC at home secondary to history of GI bleed  · Cont heparin gtt secondary to elevated D dimer  · Holding cardizem secondary to hypotension  · If BP improved need to consider restart Lopressor    · [id number] 3    Depression  Assessment & Plan  · Continue home Zoloft    Morbid obesity   Assessment & Plan  · Likely contributing to ANGELINA  · Nutrition c/s when appropriate    Hyperlipidemia  Assessment & Plan  · Continue atorvastatin     Essential hypertension  Assessment & Plan  · Continue home meds when appropriate  · Current BP stable        ----------------------------------------------------------------------------------------  HPI/24hr events: no acute events overnight; able to titrate fio2 briefly but then had hypoxia and needed to increase again; indicates he is not having pain; tolerating tube feeds    Disposition: Continue Critical Care   Code Status: Level 1 - Full Code  ---------------------------------------------------------------------------------------  SUBJECTIVE    Review of Systems   Unable to perform ROS: Intubated     ---------------------------------------------------------------------------------------  OBJECTIVE    Vitals   Vitals:    21 2301 02/10/21 0306 02/10/21 0347 02/10/21 0400   BP:       BP Location:       Pulse:    (!) 114   Resp:    19   Temp:    98 1 °F (36 7 °C)   TempSrc:       SpO2: 91% 93%  92%   Weight:   (!) 166 kg (365 lb 1 3 oz)    Height:         Temp (24hrs), Av 2 °F (36 8 °C), Min:97 7 °F (36 5 °C), Max:100 2 °F (37 9 °C)  Current: Temperature: 98 1 °F (36 7 °C)  Arterial Line BP: 94/54  Arterial Line MAP (mmHg): 68 mmHg    Respiratory:  SpO2: SpO2: 92 %, SpO2 Device: O2 Device: Ventilator  Nasal Cannula O2 Flow Rate (L/min): 50 L/min    Invasive/non-invasive ventilation settings   Respiratory    Lab Data (Last 4 hours)    None         O2/Vent Data (Last 4 hours)    None                Physical Exam  Vitals signs and nursing note reviewed  Constitutional:       General: He is not in acute distress  HENT:      Head: Normocephalic and atraumatic  Eyes:      Extraocular Movements: Extraocular movements intact  Pupils: Pupils are equal, round, and reactive to light  Cardiovascular:      Rate and Rhythm: Regular rhythm  Tachycardia present  Pulmonary:      Effort: Pulmonary effort is normal       Breath sounds: Examination of the right-lower field reveals decreased breath sounds  Examination of the left-lower field reveals decreased breath sounds  Decreased breath sounds present  Abdominal:      General: Bowel sounds are normal  There is no distension  Palpations: Abdomen is soft  Tenderness: There is no abdominal tenderness  Musculoskeletal: Normal range of motion  Right lower leg: Edema present  Left lower leg: Edema present  Skin:     General: Skin is warm and dry        Capillary Refill: Capillary refill takes less than 2 seconds  Neurological:      Mental Status: He is alert           Laboratory and Diagnostics:  Results from last 7 days   Lab Units 02/10/21  0347 02/09/21  0434 02/08/21  0449 02/07/21  0332 02/06/21  0414 02/05/21  0525 02/04/21  0459 02/03/21  0504   WBC Thousand/uL 9 85 10 37* 12 93* 14 19* 15 08* 21 84* 24 84* 15 39*   HEMOGLOBIN g/dL 9 6* 9 8* 10 1* 9 9* 9 7* 10 3* 11 8* 12 0   HEMATOCRIT % 33 5* 34 2* 35 6* 34 4* 32 8* 34 7* 38 9 41 6   PLATELETS Thousands/uL 201 194 234 213 181 196 232 224   NEUTROS PCT %  --  68  --   --  92*  --  91*  --    BANDS PCT %  --   --  2 6  --   --   --  2   MONOS PCT %  --  10  --   --  3*  --  3*  --    MONO PCT %  --   --  7 3*  --  1*  --  3*     Results from last 7 days   Lab Units 02/10/21  0347 02/09/21  0434 02/08/21  0449 02/07/21  0332 02/06/21  0414 02/05/21  0525 02/04/21  0459   SODIUM mmol/L 144 145 144 142 143 143 144   POTASSIUM mmol/L 3 1* 3 8 4 2 3 8 3 5 3 5 3 7   CHLORIDE mmol/L 105 107 106 102 105 106 107   CO2 mmol/L 34* 32 33* 32 32 32 30   ANION GAP mmol/L 5 6 5 8 6 5 7   BUN mg/dL 72* 64* 60* 59* 52* 54* 59*   CREATININE mg/dL 1 62* 1 49* 1 53* 1 48* 1 50* 1 68* 1 86*   CALCIUM mg/dL 8 3 8 6 8 1* 8 1* 7 9* 7 7* 8 1*   GLUCOSE RANDOM mg/dL 132 122 123 181* 176* 147* 144*   ALT U/L  --   --   --   --   --  19 16   AST U/L  --   --   --   --   --  19 13   ALK PHOS U/L  --   --   --   --   --  67 63   ALBUMIN g/dL  --   --   --   --   --  2 3* 2 8*   TOTAL BILIRUBIN mg/dL  --   --   --   --   --  0 54 0 60     Results from last 7 days   Lab Units 02/10/21  0347 02/09/21  0434 02/08/21  0449 02/07/21  0332 02/06/21  0414 02/04/21  0459   MAGNESIUM mg/dL 2 5 2 6 2 4 2 5 1 9 1 6   PHOSPHORUS mg/dL 4 5*  --  4 6* 4 1 4 5* 3 1      Results from last 7 days   Lab Units 02/09/21  1749 02/09/21  1129 02/09/21  0434 02/08/21  2228 02/08/21  1405 02/08/21  0449 02/07/21  0344   PTT seconds 72* 67* 59* 68* 94* 52* 68*          Results from last 7 days   Lab Units 02/07/21  0332   LACTIC ACID mmol/L 0 8     ABG:  Results from last 7 days   Lab Units 02/09/21  0733   PH ART  7 389   PCO2 ART mm Hg 54 0*   PO2 ART mm Hg 59 9*   HCO3 ART mmol/L 31 9*   BASE EXC ART mmol/L 5 8   ABG SOURCE  Line, Arterial     VBG:  Results from last 7 days   Lab Units 02/09/21  0733   ABG SOURCE  Line, Arterial     Results from last 7 days   Lab Units 02/07/21  0331 02/06/21  0414 02/04/21  0459   PROCALCITONIN ng/ml 0 45* 0 96* 0 38*       Micro  Results from last 7 days   Lab Units 02/05/21  2104 02/04/21  1703 02/04/21  1220   BLOOD CULTURE   --   --  No Growth After 5 Days  No Growth After 5 Days  SPUTUM CULTURE   --  Few Colonies of Escherichia coli ESBL*  Few Colonies of   --    GRAM STAIN RESULT   --  1+ Polys  No bacteria seen  --    MRSA CULTURE ONLY  No Methicillin Resistant Staphlyococcus aureus (MRSA) isolated  --   --        EKG: Sinus tachycardia  Imaging: I have personally reviewed pertinent films in PACS    Intake and Output  I/O       02/08 0701 - 02/09 0700 02/09 0701 - 02/10 0700    I V  (mL/kg) 329 8 (2) 582 5 (3 5)    NG/ 1000    Feedings 630 990    Total Intake(mL/kg) 1279 8 (7 8) 2572 5 (15 5)    Urine (mL/kg/hr) 1745 (0 4) 1555 (0 4)    Total Output 1745 1555    Net -465 2 +1017 5                Height and Weights   Height: 5' 11" (180 3 cm)  IBW: 75 3 kg  Body mass index is 50 92 kg/m²    Weight (last 2 days)     Date/Time   Weight    02/10/21 0347   (!) 166 (365 08)    02/09/21 0600   (!) 164 (361 55)    02/08/21 0600   (!) 164 (362)                Nutrition       Diet Orders   (From admission, onward)             Start     Ordered    02/09/21 1109  Diet Enteral/Parenteral; Tube Feeding No Oral Diet; Jevity 1 5; Continuous; 45; Prosource Protein Liquid - Two Packets; 150; Every 6 hours  Diet effective now     Question Answer Comment   Diet Type Enteral/Parenteral    Enteral/Parenteral Tube Feeding No Oral Diet    Tube Feeding Formula: Jevity 1 5    Bolus/Cyclic/Continuous Continuous    Tube Feeding Goal Rate (mL/hr): 45    Prosource Protein Liquid - No Carb Prosource Protein Liquid - Two Packets    Tube Feeding flush (mL): 150    Water flush frequency: Every 6 hours    RD to adjust diet per protocol?  Yes        02/09/21 1108    01/17/21 1719  Room Service  Once     Question:  Type of Service  Answer:  Room Service- Not Appropriate    01/17/21 1718                  Active Medications  Scheduled Meds:  Current Facility-Administered Medications   Medication Dose Route Frequency Provider Last Rate    acetaminophen  650 mg Per NG Tube Q4H PRN Sincere Harris PA-C      aspirin  81 mg Oral Daily KATHY Julian      atorvastatin  40 mg Per NG Tube HS Sincere Harris PA-C      bisacodyl  10 mg Rectal Daily PRN Sincere Harris PA-C      chlorhexidine  15 mL [facility] Sincere Harris, [place]      cholecalciferol  2,000 Units Per NG Tube Daily Sincere Harris PA-C      dexamethasone  4 mg Intravenous Daily KATHY Julian      ertapenem  1,000 mg Intravenous Q24H Karrie Gomez PA-C 1,000 mg (02/09/21 1134)    fentaNYL  50 mcg/hr Intravenous Continuous KATHY Julian 50 mcg/hr (02/09/21 1613)    fentanyl citrate (PF)  50 mcg Intravenous Q1H PRN KATHY Julian      furosemide  40 mg Intravenous Daily Shell Willson PA-C      heparin (porcine)  3-30 Units/kg/hr (Order-Specific) Intravenous Titrated Alvina Mon, DO 12 Units/kg/hr (02/09/21 1613)    heparin (porcine)  10,000 Units Intravenous Q1H PRN Alvina Mon, DO      heparin (porcine)  5,000 Units Intravenous Q1H PRN Alvina Mon,       insulin lispro  1-6 Units Subcutaneous Q6H [facility] KATHY Julian      levETIRAcetam  750 mg Oral Q12H [facility] [place] KATHY Fuentes      melatonin  6 mg Oral HS KATHY Julian      midodrine  5 mg Oral Q8H [place] KATHY Fuentes      multivitamin with iron-minerals  15 mL Per NG Tube Daily Jennifer Hinton PA-C      omeprazole (PRILOSEC) suspension 2 mg/mL  20 mg Oral Q12H KATHY Hdz      polyethylene glycol  17 g Oral BID Jennifer Hinton PA-C      potassium chloride  20 mEq Intravenous Once Jerene Meigs, CRNP      Followed by   [facility] potassium chloride  20 mEq Intravenous Once Jerene Meigs, CRNP      potassium chloride  40 mEq Per NG Tube Once Jerene Meigs, CRNP      senna-docusate sodium  1 tablet Per NG Tube BID KATHY Hdz      sertraline  50 mg Oral HS KATHY Hdz      sertraline  75 mg Oral Daily KATHY Hdz       Continuous Infusions:  fentaNYL, 50 mcg/hr, Last Rate: 50 mcg/hr (02/09/21 1613)  heparin (porcine), 3-30 Units/kg/hr (Order-Specific), Last Rate: 12 Units/kg/hr (02/09/21 1613)      PRN Meds:   acetaminophen, 650 mg, Q4H PRN  bisacodyl, 10 mg, Daily PRN  fentanyl citrate (PF), 50 mcg, Q1H PRN  heparin (porcine), 10,000 Units, Q1H PRN  heparin (porcine), 5,000 Units, Q1H PRN        Invasive Devices Review  Invasive Devices     Peripheral Intravenous Line            Long-Dwell Peripheral IV (Midline) 24/91/41 Left Cephalic 15 days    Peripheral IV 02/05/21 Right;Ventral (anterior) Arm 4 days    Peripheral IV 02/06/21 Left;Ventral (anterior) Forearm 3 days          Arterial Line            Arterial Line 02/04/21 Radial 6 days          Drain            Urethral Catheter Straight-tip 16 Fr  23 days    NG/OG/Enteral Tube Orogastric 8 days          Airway            ETT  Cuffed; Hi-Lo 8 mm 8 days                Rationale for remaining devices: Critical illness  ---------------------------------------------------------------------------------------  Advance Directive and Living Will:      Power of :    POLST:    ---------------------------------------------------------------------------------------  Care Time Delivered:   Upon my evaluation, this patient had a high probability of imminent or life-threatening deterioration due to hypoxia, which required my direct attention, intervention, and personal management  I have personally provided 30 minutes (0430 to 0500) of critical care time, exclusive of procedures, teaching, family meetings, and any prior time recorded by providers other than myself         KATHY Darby

## 2021-02-10 NOTE — PROGRESS NOTES
[address] 63 y o  male MRN: [id number]  Unit/Bed#: ICU 08 Encounter: [id number]  Reason for Consult: PATRICK    ASSESSMENT and PLAN:  1  Acute kidney injury:   · Admission creatinine of 1 34 in January 17, 2021  · Etiology felt to be due to ATN in the setting of sepsis  Peak creatinine of 2 71 on January 26, 2021  · Creatinine has been 1 5 to 1 7 the past 6 days  · SCr up to 1 62 from 1 49 yesterday - this is acceptable  2  Chronic kidney disease, stage III:   · Baseline creatinine 1 0 - 1 3     · No prior renal follow up  · Has history of dialysis dependence in 2019      3  Borderline hypernatremia:  · Stable with free water flushes  · Na 144 today  4  Hypotension:  · Currently on Midodrine 5 mg TID  · BP is acceptable  5  COVID-19 infection : Per CC  6  Ventilator dependent respiratory failure: per CC    7  Pneumonia    DISPOSITION:  · Stable renal function - albeit creatinine above historical baseline  · Okay to continue Furosemide 40 mg IV daily  The above plan was discussed with crit care  SUBJECTIVE / 24H INTERVAL HISTORY:  No new acute events  Not on vasopressors  Remains vent dependent  FiO2 up to 70% today  OBJECTIVE:  Current Weight: Weight - Scale: (!) 166 kg (365 lb 1 3 oz)  Vitals:    02/10/21 0900 02/10/21 1000 02/10/21 1132 02/10/21 1200   BP:       BP Location:       Pulse: (!) 109 (!) 107  (!) 106   Resp: 22 22 21   Temp: 97 7 °F (36 5 °C) 97 9 °F (36 6 °C)  98 2 °F (36 8 °C)   TempSrc:       SpO2: 93% 92% 94%    Weight:       Height:           Intake/Output Summary (Last 24 hours) at 2/10/2021 1308  Last data filed at 2/10/2021 1200  Gross per 24 hour   Intake 2920 ml   Output 1855 ml   Net 1065 ml     The patient was seen and examined through the glass window in order to minimize exposure to COVID19 infection  General: sedated on the vent, no visible distress  Skin: no visible rash  Eyes: closed     ENT: ETT in place    Chest/Lungs: on the vent  CVS: tachy on monitor  Abdomen: could not evaluate  Extremities: externally normal appearing  : (+) bo catheter     Neuro: could not eval    Psych: could not eval      Medications:    Current Facility-Administered Medications:     acetaminophen (TYLENOL) oral suspension 650 mg, 650 mg, Per NG Tube, Q4H PRN, Lan Buchanan PA-C, 650 mg at 02/08/21 1734    aspirin chewable tablet 81 mg, 81 mg, Oral, Daily, KATHY Herrera, 81 mg at 02/10/21 0835    atorvastatin (LIPITOR) tablet 40 mg, 40 mg, Per NG Tube, HS, Lan Buchanan PA-C, 40 mg at 02/09/21 2211    bisacodyl (DULCOLAX) rectal suppository 10 mg, 10 mg, Rectal, Daily PRN, Lan Buchanan PA-C    chlorhexidine (PERIDEX) 0 12 % oral rinse 15 mL, 15 mL, Swish & Spit, Q12H [address], Lan Buchanan PA-C, 15 mL at 02/10/21 5177    cholecalciferol (VITAMIN D3) tablet 2,000 Units, 2,000 Units, Per NG Tube, Daily, Lan Buchanan PA-C, 2,000 Units at 02/10/21 0835    dexamethasone (DECADRON) injection 4 mg, 4 mg, Intravenous, Daily, KATHY Herrera, 4 mg at 02/10/21 1259    ertapenem (INVanz) 1,000 mg in sodium chloride 0 9 % 50 mL IVPB, 1,000 mg, Intravenous, Q24H, Lillian Rojas PA-C, Last Rate: 100 mL/hr at 02/10/21 0840, 1,000 mg at 02/10/21 0840    fentaNYL 1000 mcg in sodium chloride 0 9% 100mL infusion, 50 mcg/hr, Intravenous, Continuous, KATHY Herrera, Last Rate: 5 mL/hr at 02/10/21 0837, 50 mcg/hr at 02/10/21 0837    fentanyl citrate (PF) 100 MCG/2ML 50 mcg, 50 mcg, Intravenous, Q1H PRN, KATHY Herrera, 50 mcg at 02/10/21 0314    furosemide (LASIX) injection 40 mg, 40 mg, Intravenous, Daily, Shell Willson PA-C, 40 mg at 02/10/21 0835    heparin (porcine) 25,000 units in 0 45% NaCl 250 mL infusion (premix), 3-30 Units/kg/hr (Order-Specific), Intravenous, Titrated, Pérez Carney DO, Last Rate: 15 mL/hr at 02/10/21 0834, 12 Units/kg/hr at 02/10/21 5826    heparin (porcine) injection 10,000 Units, 10,000 Units, Intravenous, Q1H PRN, Marcella Stevenson DO    heparin (porcine) injection 5,000 Units, 5,000 Units, Intravenous, Q1H PRN, Pérez Carney DO, 5,000 Units at 02/09/21 0528    insulin lispro (HumaLOG) 100 units/mL subcutaneous injection 1-6 Units, 1-6 Units, Subcutaneous, Q6H [facility], 1 Units at 02/07/21 0608 **AND** Fingerstick Glucose (POCT), , , Q6H, KATHY Domingo    levETIRAcetam (KEPPRA) oral solution 750 mg, 750 mg, Oral, Q12H ARACELIS, KATHY Domingo, 750 mg at 02/10/21 0840    melatonin tablet 6 mg, 6 mg, Oral, HS, KATHY Domingo, 6 mg at 02/09/21 2211    midodrine (PROAMATINE) tablet 5 mg, 5 mg, Oral, Q8H, KATHY Domingo, 5 mg at 02/10/21 0835    [COMPLETED] zinc sulfate (ZINCATE) capsule 220 mg, 220 mg, Per NG Tube, Daily, 220 mg at 01/24/21 0824 **FOLLOWED BY** multivitamin with iron-minerals liquid 15 mL, 15 mL, Per NG Tube, Daily, Lan Buchanan PA-C, 15 mL at 02/10/21 0840    omeprazole (PRILOSEC) suspension 2 mg/mL, 20 mg, Oral, Q12H, KATHY Domingo, 20 mg at 02/10/21 0347    polyethylene glycol (MIRALAX) packet 17 g, 17 g, Oral, BID, Lan Buchanan PA-C, 17 g at 02/10/21 0834    senna-docusate sodium (SENOKOT S) 8 6-50 mg per tablet 1 tablet, 1 tablet, Per NG Tube, BID, KATHY Herrera, 1 tablet at 02/10/21 0835    sertraline (ZOLOFT) tablet 50 mg, 50 mg, Oral, HS, KATHY Domingo, 50 mg at 02/09/21 2211    sertraline (ZOLOFT) tablet 75 mg, 75 mg, Oral, Daily, KATHY Herrera, 75 mg at 02/10/21 4685    Laboratory Results:  Results from last 7 days   Lab Units 02/10/21  0347 02/09/21  0434 02/08/21  0449 02/07/21  0332 02/06/21  0414 02/05/21  0525 02/04/21  0459   WBC Thousand/uL 9 85 10 37* 12 93* 14 19* 15 08* 21 84* 24 84*   HEMOGLOBIN g/dL 9 6* 9 8* 10 1* 9 9* 9 7* 10 3* 11 8*   HEMATOCRIT % 33 5* 34 2* 35 6* 34 4* 32 8* 34 7* 38 9 PLATELETS Thousands/uL 201 194 234 213 181 196 232   POTASSIUM mmol/L 3 1* 3 8 4 2 3 8 3 5 3 5 3 7   CHLORIDE mmol/L 105 107 106 102 105 106 107   CO2 mmol/L 34* 32 33* 32 32 32 30   BUN mg/dL 72* 64* 60* 59* 52* 54* 59*   CREATININE mg/dL 1 62* 1 49* 1 53* 1 48* 1 50* 1 68* 1 86*   CALCIUM mg/dL 8 3 8 6 8 1* 8 1* 7 9* 7 7* 8 1*   MAGNESIUM mg/dL 2 5 2 6 2 4 2 5 1 9  --  1 6   PHOSPHORUS mg/dL 4 5*  --  4 6* 4 1 4 5*  --  3 1

## 2021-02-10 NOTE — ASSESSMENT & PLAN NOTE
Lab Results   Component Value Date    EGFR 45 02/10/2021    EGFR 50 02/09/2021    EGFR 48 02/08/2021    CREATININE 1 62 (H) 02/10/2021    CREATININE 1 49 (H) 02/09/2021    CREATININE 1 53 (H) 02/08/2021     · Creatine improved to 1 62 today   · Diuresis as needed  · Renal following  · Cont Strict I/O  · Avoid nephrotoxic meds

## 2021-02-11 ENCOUNTER — APPOINTMENT (INPATIENT)
Dept: RADIOLOGY | Facility: HOSPITAL | Age: 61
DRG: 130 | End: 2021-02-11
Payer: COMMERCIAL

## 2021-02-11 PROBLEM — R78.81 BACTEREMIA: Status: RESOLVED | Noted: 2021-01-21 | Resolved: 2021-02-11

## 2021-02-11 LAB
ANION GAP SERPL CALCULATED.3IONS-SCNC: 7 MMOL/L (ref 4–13)
ANISOCYTOSIS BLD QL SMEAR: PRESENT
APTT PPP: 84 SECONDS (ref 23–37)
ATRIAL RATE: 154 BPM
BASO STIPL BLD QL SMEAR: PRESENT
BASOPHILS # BLD MANUAL: 0.09 THOUSAND/UL (ref 0–0.1)
BASOPHILS NFR MAR MANUAL: 1 % (ref 0–1)
BUN SERPL-MCNC: 70 MG/DL (ref 5–25)
CALCIUM SERPL-MCNC: 8.5 MG/DL (ref 8.3–10.1)
CHLORIDE SERPL-SCNC: 106 MMOL/L (ref 100–108)
CO2 SERPL-SCNC: 32 MMOL/L (ref 21–32)
CREAT SERPL-MCNC: 1.57 MG/DL (ref 0.6–1.3)
EOSINOPHIL # BLD MANUAL: 1.85 THOUSAND/UL (ref 0–0.4)
EOSINOPHIL NFR BLD MANUAL: 20 % (ref 0–6)
ERYTHROCYTE [DISTWIDTH] IN BLOOD BY AUTOMATED COUNT: 23 % (ref 11.6–15.1)
GFR SERPL CREATININE-BSD FRML MDRD: 47 ML/MIN/1.73SQ M
GLUCOSE SERPL-MCNC: 109 MG/DL (ref 65–140)
GLUCOSE SERPL-MCNC: 124 MG/DL (ref 65–140)
GLUCOSE SERPL-MCNC: 126 MG/DL (ref 65–140)
GLUCOSE SERPL-MCNC: 130 MG/DL (ref 65–140)
GLUCOSE SERPL-MCNC: 137 MG/DL (ref 65–140)
HCT VFR BLD AUTO: 32.8 % (ref 36.5–49.3)
HGB BLD-MCNC: 9 G/DL (ref 12–17)
LYMPHOCYTES # BLD AUTO: 0.28 THOUSAND/UL (ref 0.6–4.47)
LYMPHOCYTES # BLD AUTO: 3 % (ref 14–44)
MAGNESIUM SERPL-MCNC: 3 MG/DL (ref 1.6–2.6)
MCH RBC QN AUTO: 23.2 PG (ref 26.8–34.3)
MCHC RBC AUTO-ENTMCNC: 27.4 G/DL (ref 31.4–37.4)
MCV RBC AUTO: 85 FL (ref 82–98)
MONOCYTES # BLD AUTO: 0.46 THOUSAND/UL (ref 0–1.22)
MONOCYTES NFR BLD: 5 % (ref 4–12)
NEUTROPHILS # BLD MANUAL: 6.38 THOUSAND/UL (ref 1.85–7.62)
NEUTS BAND NFR BLD MANUAL: 3 % (ref 0–8)
NEUTS SEG NFR BLD AUTO: 66 % (ref 43–75)
NRBC BLD AUTO-RTO: 0 /100 WBCS
NRBC BLD AUTO-RTO: 1 /100 WBC (ref 0–2)
OVALOCYTES BLD QL SMEAR: PRESENT
PLATELET # BLD AUTO: 187 THOUSANDS/UL (ref 149–390)
PLATELET BLD QL SMEAR: ADEQUATE
PMV BLD AUTO: 12 FL (ref 8.9–12.7)
POIKILOCYTOSIS BLD QL SMEAR: PRESENT
POLYCHROMASIA BLD QL SMEAR: PRESENT
POTASSIUM SERPL-SCNC: 3.4 MMOL/L (ref 3.5–5.3)
QRS AXIS: 77 DEGREES
QRSD INTERVAL: 148 MS
QT INTERVAL: 356 MS
QTC INTERVAL: 570 MS
RBC # BLD AUTO: 3.88 MILLION/UL (ref 3.88–5.62)
SODIUM SERPL-SCNC: 145 MMOL/L (ref 136–145)
T WAVE AXIS: -46 DEGREES
TOTAL CELLS COUNTED SPEC: 100
VARIANT LYMPHS # BLD AUTO: 2 %
VENTRICULAR RATE: 154 BPM
WBC # BLD AUTO: 9.24 THOUSAND/UL (ref 4.31–10.16)

## 2021-02-11 PROCEDURE — 94150 VITAL CAPACITY TEST: CPT

## 2021-02-11 PROCEDURE — 85730 THROMBOPLASTIN TIME PARTIAL: CPT | Performed by: NURSE PRACTITIONER

## 2021-02-11 PROCEDURE — 99233 SBSQ HOSP IP/OBS HIGH 50: CPT | Performed by: INTERNAL MEDICINE

## 2021-02-11 PROCEDURE — 94762 N-INVAS EAR/PLS OXIMTRY CONT: CPT

## 2021-02-11 PROCEDURE — 93010 ELECTROCARDIOGRAM REPORT: CPT | Performed by: INTERNAL MEDICINE

## 2021-02-11 PROCEDURE — 94760 N-INVAS EAR/PLS OXIMETRY 1: CPT

## 2021-02-11 PROCEDURE — 83735 ASSAY OF MAGNESIUM: CPT | Performed by: NURSE PRACTITIONER

## 2021-02-11 PROCEDURE — 93005 ELECTROCARDIOGRAM TRACING: CPT

## 2021-02-11 PROCEDURE — 80048 BASIC METABOLIC PNL TOTAL CA: CPT | Performed by: NURSE PRACTITIONER

## 2021-02-11 PROCEDURE — 99291 CRITICAL CARE FIRST HOUR: CPT | Performed by: INTERNAL MEDICINE

## 2021-02-11 PROCEDURE — 85007 BL SMEAR W/DIFF WBC COUNT: CPT | Performed by: NURSE PRACTITIONER

## 2021-02-11 PROCEDURE — 71045 X-RAY EXAM CHEST 1 VIEW: CPT

## 2021-02-11 PROCEDURE — 82948 REAGENT STRIP/BLOOD GLUCOSE: CPT

## 2021-02-11 PROCEDURE — 94003 VENT MGMT INPAT SUBQ DAY: CPT

## 2021-02-11 PROCEDURE — 85027 COMPLETE CBC AUTOMATED: CPT | Performed by: NURSE PRACTITIONER

## 2021-02-11 RX ORDER — MAGNESIUM SULFATE HEPTAHYDRATE 40 MG/ML
2 INJECTION, SOLUTION INTRAVENOUS ONCE
Status: DISCONTINUED | OUTPATIENT
Start: 2021-02-11 | End: 2021-02-12

## 2021-02-11 RX ORDER — METOPROLOL TARTRATE 5 MG/5ML
2.5 INJECTION INTRAVENOUS EVERY 6 HOURS PRN
Status: DISCONTINUED | OUTPATIENT
Start: 2021-02-11 | End: 2021-02-11

## 2021-02-11 RX ORDER — POTASSIUM CHLORIDE 20MEQ/15ML
40 LIQUID (ML) ORAL ONCE
Status: COMPLETED | OUTPATIENT
Start: 2021-02-11 | End: 2021-02-11

## 2021-02-11 RX ORDER — ALBUMIN (HUMAN) 12.5 G/50ML
25 SOLUTION INTRAVENOUS ONCE
Status: COMPLETED | OUTPATIENT
Start: 2021-02-11 | End: 2021-02-11

## 2021-02-11 RX ORDER — ALBUMIN (HUMAN) 12.5 G/50ML
12.5 SOLUTION INTRAVENOUS ONCE
Status: COMPLETED | OUTPATIENT
Start: 2021-02-11 | End: 2021-02-12

## 2021-02-11 RX ORDER — METOPROLOL TARTRATE 5 MG/5ML
2.5 INJECTION INTRAVENOUS EVERY 6 HOURS
Status: DISCONTINUED | OUTPATIENT
Start: 2021-02-11 | End: 2021-02-12

## 2021-02-11 RX ADMIN — ALBUMIN (HUMAN) 25 G: 0.25 INJECTION, SOLUTION INTRAVENOUS at 02:49

## 2021-02-11 RX ADMIN — SERTRALINE 75 MG: 25 TABLET, FILM COATED ORAL at 08:46

## 2021-02-11 RX ADMIN — Medication 20 MG: at 02:09

## 2021-02-11 RX ADMIN — FUROSEMIDE 40 MG: 10 INJECTION, SOLUTION INTRAMUSCULAR; INTRAVENOUS at 08:45

## 2021-02-11 RX ADMIN — SERTRALINE HYDROCHLORIDE 50 MG: 50 TABLET ORAL at 22:44

## 2021-02-11 RX ADMIN — DEXAMETHASONE SODIUM PHOSPHATE 4 MG: 4 INJECTION, SOLUTION INTRA-ARTICULAR; INTRALESIONAL; INTRAMUSCULAR; INTRAVENOUS; SOFT TISSUE at 08:47

## 2021-02-11 RX ADMIN — Medication 2000 UNITS: at 08:46

## 2021-02-11 RX ADMIN — Medication 6 MG: at 22:44

## 2021-02-11 RX ADMIN — ALBUMIN (HUMAN) 12.5 G: 0.25 INJECTION, SOLUTION INTRAVENOUS at 11:43

## 2021-02-11 RX ADMIN — ASPIRIN 81 MG CHEWABLE TABLET 81 MG: 81 TABLET CHEWABLE at 08:46

## 2021-02-11 RX ADMIN — POTASSIUM CHLORIDE 40 MEQ: 20 SOLUTION ORAL at 08:46

## 2021-02-11 RX ADMIN — HEPARIN SODIUM 12 UNITS/KG/HR: 10000 INJECTION, SOLUTION INTRAVENOUS at 19:35

## 2021-02-11 RX ADMIN — DESMOPRESSIN ACETATE 40 MG: 0.2 TABLET ORAL at 22:45

## 2021-02-11 RX ADMIN — MIDODRINE HYDROCHLORIDE 5 MG: 5 TABLET ORAL at 08:45

## 2021-02-11 RX ADMIN — MIDODRINE HYDROCHLORIDE 5 MG: 5 TABLET ORAL at 17:11

## 2021-02-11 RX ADMIN — METOPROLOL TARTRATE 2.5 MG: 5 INJECTION, SOLUTION INTRAVENOUS at 11:43

## 2021-02-11 RX ADMIN — METOPROLOL TARTRATE 2.5 MG: 5 INJECTION INTRAVENOUS at 22:44

## 2021-02-11 RX ADMIN — Medication 20 MG: at 17:12

## 2021-02-11 RX ADMIN — HEPARIN SODIUM 12 UNITS/KG/HR: 10000 INJECTION, SOLUTION INTRAVENOUS at 01:22

## 2021-02-11 RX ADMIN — Medication 50 MCG/HR: at 19:35

## 2021-02-11 RX ADMIN — MULTIVITAMIN 15 ML: LIQUID ORAL at 08:47

## 2021-02-11 RX ADMIN — METOPROLOL TARTRATE 2.5 MG: 5 INJECTION INTRAVENOUS at 17:12

## 2021-02-11 RX ADMIN — CHLORHEXIDINE GLUCONATE 0.12% ORAL RINSE 15 ML: 1.2 LIQUID ORAL at 20:51

## 2021-02-11 RX ADMIN — LEVETIRACETAM 750 MG: 100 SOLUTION ORAL at 20:52

## 2021-02-11 RX ADMIN — FENTANYL CITRATE 50 MCG: 50 INJECTION INTRAMUSCULAR; INTRAVENOUS at 02:10

## 2021-02-11 RX ADMIN — ERTAPENEM SODIUM 1000 MG: 1 INJECTION, POWDER, LYOPHILIZED, FOR SOLUTION INTRAMUSCULAR; INTRAVENOUS at 08:47

## 2021-02-11 RX ADMIN — LEVETIRACETAM 750 MG: 100 SOLUTION ORAL at 08:46

## 2021-02-11 RX ADMIN — CHLORHEXIDINE GLUCONATE 0.12% ORAL RINSE 15 ML: 1.2 LIQUID ORAL at 08:46

## 2021-02-11 NOTE — ASSESSMENT & PLAN NOTE
· Confirmed positive 1/17  · Continue Decadron changed to 4mg/daily on 2/9   · VC/Zinc completed   Continue MV  · Continue statin per protocol  · Continue pepcid   · Actemra given 1/28  · Conv Plasma 1/17  · Cont Heparin drip per protocol for elevated D dimer  · Not candidate for remdesivir  · Cont Ertapenum for superimposed ESBL E choli bacterial pneumonia

## 2021-02-11 NOTE — ASSESSMENT & PLAN NOTE
· Secondary to hypoxia/hypercapnia and history of CVA with attempt to wean sedation and monitor for seizure activity   · Neurology consulted not additional intervention  · EEG- no seizure activity   · CAM-ICU   · Continue Zoloft  · Continue Fentanyl for sedation, patient is awake and following commands  · Able to nod and gesture appropriately

## 2021-02-11 NOTE — RESPIRATORY THERAPY NOTE
02/10/21 1954   Vent Information   Vent    Vent type [place] G5   [place] G5 Vent Mode (S)CMV   $ Vital Capacity Mech/Peak Flow Yes   $ Pulse Oximetry Spot Check Charge Completed   SpO2 94 %   (S)CMV Settings   Resp Rate (BPM) 18 BPM   VT (mL) 450 mL   FIO2 (%) 70 %   PEEP (cmH2O) 10 cmH2O   I:E Ratio 1:4 6   Insp Time (%) 0 6 %   Flow Trigger (LPM) 3   Humidification Heater   Heater Temperature (Set) 98 6 °F (37 °C)   (S)CMV Actuals   Resp Rate (BPM) 19 BPM   VT (mL) 501   MV 9 4   MAP (cmH2O) 9 cmH2O   Peak Pressure (cmH2O) 38 cmH2O   I:E Ratio (Obs) 1:3 7   Insp Resistance 19   Heater Temperature (Obs) 98 6 °F (37 °C)   Static Compliance (mL/cmH20) 36 mL/cmH2O   Plateau Pressure (cm H2O) 16 2 cm H2O   (S)CMV Alarms   High Peak Pressure (cmH2O) 45   Low Pressure (cmH2O) 5 cm H2O   High Resp Rate (BPM) 40 BPM   Low Resp Rate (BPM) 8 BPM   High MV (L/min) 20 L/min   Low MV (L/min) 4 L/min   High VT (mL) 800 mL   Low VT (mL) 250 mL   Apnea Time (s) 20 S   Maintenance   Alarm (pink) cable attached Yes   Resuscitation bag with peep valve at bedside Yes   Water bag changed No   Circuit changed No   RT Ventilator Management Note  [facility] 63 y o  male MRN: [id number]  Unit/Bed#: ICU 08 Encounter: [id number]      Daily Screen       2/9/2021  0713 2/10/2021  0740          Patient safety screen outcome[de-identified]  Failed  Failed      Not Ready for Weaning due to[de-identified]  PEEP > 8cmH2O;Underline problem not resolved  PEEP > 8cmH2O;FiO2 >60%              Physical Exam:          Resp Comments: Pt received intubated with size 8  0ETT secured [email or web address]  Remains on [place] G5 ventilator with settings of (S)CMV 18/450/10/70%  RTol well  Will continue to monitor

## 2021-02-11 NOTE — ASSESSMENT & PLAN NOTE
· Patient does not have a seizure history but does have history of CVA in the past  · Neurology consulted and appreciate input, have signed off at this time  · Intubated 2/1 for suspected seizure activity   · Loaded with 2G Keppra and continued on Keppra 750mg BID    · EEG shows no seizure activity- will discuss with neuro need to continue Keppra

## 2021-02-11 NOTE — ASSESSMENT & PLAN NOTE
Lab Results   Component Value Date    EGFR 47 02/11/2021    EGFR 45 02/10/2021    EGFR 45 02/10/2021    CREATININE 1 57 (H) 02/11/2021    CREATININE 1 63 (H) 02/10/2021    CREATININE 1 62 (H) 02/10/2021     · Creatine improved to 1 62 today which appears to be at baseline   · Diurese PRN   · Renal following  · Cont Strict I/O  · Avoid nephrotoxic meds

## 2021-02-11 NOTE — ASSESSMENT & PLAN NOTE
· No active bleeding  · Trend CBC daily   · hgb slowly drifting down, 9 0 today  · Transfuse for Hgb < 7 0

## 2021-02-11 NOTE — ASSESSMENT & PLAN NOTE
· Secondary to COVID-19 and superimposed ESBL e coli pneumonia   · Previously he had been intubated on 1/17 and extubated on 1/18, reintubated 2/1  · Vent day #11- Wean vent setting as tolerated currently on AC/VC 18/ 450/ 60%/ 10- patient was on 70% fio2 this AM, weaned down to 60% with spo2 remaining at 93%  · Titrate FiO2 for SpO2 > 90%  · Covid therapy per severe pathway protocol   · ABG as needed  · Goals of care discussion with significant other  · May need to start considering trach/peg eval

## 2021-02-11 NOTE — UTILIZATION REVIEW
Continued Stay Review    Date: 2/11/2021                        Current Patient Class: inpt     Current Level of Care: critical     HPI:60 y o  male initially admitted on 1/17/2021 transferred from [facility] ED to [address] ICU admitted as inpatient due to COVID 19 pneumonia and hypercapnic resp failure  Presented to [facility] ED from nursing home with altered mental status, fever, and resp distress, found to be COVID positive within the past week  Worsened over prior 24 hrs with mi 80's sat on 3 liters  Improved to 90% on 8 liters  Was somnolent with septic workup in progress    Assessment/Plan: GCS=4/1/6= 11   on mechanical ventilation DAY#11- no improvements w Pulmonary status-patient with sats @ 88%- transient increase to 100% w wean to 70% w vent adjustments  COVID 19 PNA w 2ndary bacterial infection w ESBL E coli  On IV antibx day 5/7, IV decadron taper, Actemra 1/28, convalescent Plasma 1/17  patient having a tachyarrhythmia with a heart rate of 150, wide complex on the monitor  Associated with hypotension  Blood pressure was 81/50  Patient awake and alert and denied complaints  He was given 2 g of magnesium and Lopressor 2 5 mg IV  His heart rate improved to the 90s  Still with wide complex, irregular rhythm  Blood pressure improved to 112/70 with improvement in heart rate  Patient is allergic to amiodarone  If he develops tachycardia with associated hemodynamic instability,  May require cardioversion  Cont IV Heparin  PATRICK on CKD3-cont IV Lasix  Stat chest x-ray was done and interpreted at the bedside  There are diffuse bilateral ground-glass opacities, overall unchanged    Pertinent Labs/Diagnostic Results:       Results from last 7 days   Lab Units 02/11/21  0533 02/10/21  0347 02/09/21  0434 02/08/21  0449 02/07/21  0332  02/06/21  0414   WBC Thousand/uL 9 24 9 85 10 37* 12 93* 14 19*  --  15 08*   HEMOGLOBIN g/dL 9 0* 9 6* 9 8* 10 1* 9 9*  --  9 7*   HEMATOCRIT % 32 8* 33 5* 34 2* 35 6* 34 4*  --  32 8*   PLATELETS Thousands/uL 187 201 194 234 213  --  181   NEUTROS ABS Thousands/µL  --   --  7 07  --   --   --  14 05*   BANDS PCT % 3 4  --  2 6   < >  --     < > = values in this interval not displayed           Results from last 7 days   Lab Units 02/11/21  0533 02/10/21  1930 02/10/21  0347 02/09/21  0434 02/08/21  0449 02/07/21  0332 02/06/21  0414   SODIUM mmol/L 145 146* 144 145 144 142 143   POTASSIUM mmol/L 3 4* 3 5 3 1* 3 8 4 2 3 8 3 5   CHLORIDE mmol/L 106 107 105 107 106 102 105   CO2 mmol/L 32 33* 34* 32 33* 32 32   ANION GAP mmol/L 7 6 5 6 5 8 6   BUN mg/dL 70* 71* 72* 64* 60* 59* 52*   CREATININE mg/dL 1 57* 1 63* 1 62* 1 49* 1 53* 1 48* 1 50*   EGFR ml/min/1 73sq m 47 45 45 50 48 50 50   CALCIUM mg/dL 8 5 8 5 8 3 8 6 8 1* 8 1* 7 9*   CALCIUM, IONIZED mmol/L  --   --   --   --  1 12  --   --    MAGNESIUM mg/dL 3 0* 2 6 2 5 2 6 2 4 2 5 1 9   PHOSPHORUS mg/dL  --   --  4 5*  --  4 6* 4 1 4 5*     Results from last 7 days   Lab Units 02/05/21  0525   AST U/L 19   ALT U/L 19   ALK PHOS U/L 67   TOTAL PROTEIN g/dL 5 3*   ALBUMIN g/dL 2 3*   TOTAL BILIRUBIN mg/dL 0 54     Results from last 7 days   Lab Units 02/11/21  1139 02/11/21  0533 02/10/21  2348 02/10/21  1747 02/10/21  1132 02/10/21  0626 02/10/21  0025 02/09/21  1742 02/09/21  1113 02/09/21  0054 02/08/21  1710 02/08/21  1208   POC GLUCOSE mg/dl 130 124 109 117 148* 144* 125 136 129 112 113 125     Results from last 7 days   Lab Units 02/11/21  0533 02/10/21  1930 02/10/21  0347 02/09/21  0434 02/08/21  0449 02/07/21  0332 02/06/21  0414 02/05/21  0525   GLUCOSE RANDOM mg/dL 126 116 132 122 123 181* 176* 147*             No results found for: BETA-HYDROXYBUTYRATE   Results from last 7 days   Lab Units 02/09/21  0733 02/08/21  0450 02/07/21  0607   PH ART  7 389 7 308* 7 397   PCO2 ART mm Hg 54 0* 66 8* 52 3*   PO2 ART mm Hg 59 9* 72 7* 58 7*   HCO3 ART mmol/L 31 9* 32 7* 31 5*   BASE EXC ART mmol/L 5 8 4 8 5 6   O2 CONTENT ART mL/dL 13 5* 14 5* 13 9*   O2 HGB, ARTERIAL % 90 3* 91 8* 89 7*   ABG SOURCE  Line, Arterial Line, Arterial Line, Arterial                     Results from last 7 days   Lab Units 02/06/21  0414   D-DIMER QUANTITATIVE ug/ml FEU 1 47*     Results from last 7 days   Lab Units 02/11/21  0533 02/10/21  0512 02/09/21  1749   PTT seconds 84* 72* 72*         Results from last 7 days   Lab Units 02/07/21  0331 02/06/21  0414   PROCALCITONIN ng/ml 0 45* 0 96*     Results from last 7 days   Lab Units 02/07/21  0332   LACTIC ACID mmol/L 0 8             Results from last 7 days   Lab Units 02/06/21  0414   NT-PRO BNP pg/mL 1,470*     Results from last 7 days   Lab Units 02/06/21  0414   FERRITIN ng/mL 170             Results from last 7 days   Lab Units 02/06/21  0414   CRP mg/L 66 7*           Results from last 7 days   Lab Units 02/04/21  1703   SPUTUM CULTURE  Few Colonies of Escherichia coli ESBL*  Few Colonies of    GRAM STAIN RESULT  1+ Polys  No bacteria seen     Results from last 7 days   Lab Units 02/11/21  0533 02/10/21  0347 02/08/21  0449 02/07/21  0332 02/05/21  0525   TOTAL COUNTED  100 100 100 100 100     2//11 CXR Slight improvement of aeration of the left lung   Otherwise, no significant change of bilateral infiltrates  Vital Signs:   Date/Time  Temp  Pulse  Resp  BP  MAP (mmHg)  Arterial Line BP  MAP  SpO2  FiO2 (%)  O2 Device  Patient Position - Orthostatic VS   02/11/21 1548  --  --  --  --  --  --  --  90 %  70  Ventilator  --   02/11/21 1400  97 5 °F (36 4 °C)  102  19  --  --  100/51  67 mmHg  95 %  --  --  --   02/11/21 1300  97 5 °F (36 4 °C)  95  19  --  --  91/47  62 mmHg  95 %  --  --  --   02/11/21 1203  --  --  --  --  --  --  --  94 %  70  Ventilator  --   02/11/21 1100  --  105  17  --  --  109/51  71 mmHg  91 %  --  --  --   02/11/21 1000  --  107Abnormal   18  --  --  104/48  68 mmHg  89 %Abnormal   --  --  --   02/11/21 0934  --  --  --  --  --  --  --  95 %  60  Ventilator  -- 02/11/21 0900  97 3 °F (36 3 °C)Abnormal   102  18  --  --  142/56  81 mmHg  96 %  --  --  --   02/11/21 0800  97 2 °F (36 2 °C)Abnormal   104  19  --  --  101/36  59 mmHg  89 %Abnormal   --  --  --   02/11/21 0700  97 2 °F (36 2 °C)Abnormal   105  19  --  --  105/40  61 mmHg  --  --  --  --   02/11/21 0600  97 2 °F (36 2 °C)Abnormal   101  37Abnormal   --  --  121/44  67 mmHg  93 %  --  --  --   02/11/21 0500  97 2 °F (36 2 °C)Abnormal   97  27Abnormal   --  --  110/41  63 mmHg  95 %  --  --  --   02/11/21 0400  97 °F (36 1 °C)Abnormal   96  28Abnormal   --  --  108/45  66 mmHg  93 %  --  --  --   02/11/21 0316  --  --  --  --  --  --  --  94 %  70  Ventilator  --   02/11/21 0300  97 °F (36 1 °C)Abnormal   100  19  --  --  122/55  76 mmHg  93 %  --  --  --   02/11/21 0200  97 2 °F (36 2 °C)Abnormal   95  22  --  --  95/47  62 mmHg  92 %  --  --  --   02/11/21 0133  97 3 °F (36 3 °C)Abnormal   95  18  103/67  80  98/52  67 mmHg  93 %  --  --  --   02/11/21 0100  97 2 °F (36 2 °C)Abnormal   94  19  --  --  92/45  61 mmHg  93 %  --  --  --   02/11/21 0000  97 3 °F (36 3 °C)Abnormal   100  25Abnormal   --  --  104/46  66 mmHg  93 %  --  --  --         Medications:   Scheduled Medications:  aspirin, 81 mg, Oral, Daily  atorvastatin, 40 mg, Per NG Tube, HS  chlorhexidine, 15 mL, Swish & Spit, Q12H [address]  cholecalciferol, 2,000 Units, Per NG Tube, Daily  dexamethasone, 4 mg, Intravenous, Daily  ertapenem, 1,000 mg, Intravenous, Q24H  furosemide, 40 mg, Intravenous, Daily  insulin lispro, 1-6 Units, Subcutaneous, Q6H ARACELIS  levETIRAcetam, 750 mg, Oral, Q12H ARACELIS  magnesium sulfate, 2 g, Intravenous, Once  melatonin, 6 mg, Oral, HS  metoprolol, 2 5 mg, Intravenous, Q6H  midodrine, 5 mg, Oral, Q8H  multivitamin with iron-minerals, 15 mL, Per NG Tube, Daily  omeprazole (PRILOSEC) suspension 2 mg/mL, 20 mg, Oral, Q12H  polyethylene glycol, 17 g, Oral, BID  senna-docusate sodium, 1 tablet, Per NG Tube, BID  sertraline, 50 mg, Oral, HS  sertraline, 75 mg, Oral, Daily  Continuous IV Infusions:  fentaNYL, 50 mcg/hr, Intravenous, Continuous  heparin (porcine), 3-30 Units/kg/hr (Order-Specific), Intravenous, Titrated      PRN Meds:  acetaminophen, 650 mg, Per NG Tube, Q4H PRN  bisacodyl, 10 mg, Rectal, Daily PRN  fentanyl citrate (PF), 50 mcg, Intravenous, Q1H PRN  heparin (porcine), 10,000 Units, Intravenous, Q1H PRN  heparin (porcine), 5,000 Units, Intravenous, Q1H PRN      Discharge Plan: [facility]  Network Utilization Review Department  ATTENTION: Please call with any questions or concerns to [phone] and carefully listen to the prompts so that you are directed to the right person  All voicemails are confidential   Neptali Ho all requests for admission clinical reviews, approved or denied determinations and any other requests to dedicated fax number below belonging to the campus where the patient is receiving treatment   List of dedicated fax numbers for the Facilities:  [address] DENIALS (Administrative/Medical Necessity) [phone]   [address] (Maternity/NICU/Pediatrics) [phone]   [address] Wally Sykes 5133 (Addi Jim "Mila" 103) [address] [address] [place] [address] Radha Gallagher 1481 [address] [place] [address] [phone]

## 2021-02-11 NOTE — PROGRESS NOTES
NEPHROLOGY PROGRESS NOTE   Ilda Reed 63 y o  male MRN: [id number]  Unit/Bed#: ICU 08 Encounter: [id number]  Reason for Consult: PATRICK/CKD    ASSESSMENT/PLAN:  1  Acute Kidney Injury, POA- secondary to COVID 19 cytokine release + contrast induced nephropathy (1/17) +/- vancomycin toxicity  - UA: large blood, innumerable RBC, fine and coarse granular casts, 2+ protein  - currently on lasix 40mg IV daily  - good urine output, monitor  - creatinine stable around 1 5  - avoid hypotension, avoid nephrotoxins  2  Chronic Kidney Disease stage III- Baseline creatinine 1 1-1 3   May have higher baseline creatinine upon discharge   Follows with a nephrologist in the [facility] area  History of dialysis dependence in 2019  3  Hypokalemia- replete as needed  4  Hypotension- off pressors now per ICU team  - continue midodrine 5mg TID  5  Volume Status- difficult examination from outside room  - currently on daily lasix but seems to be trending less negative overall  - consider increasing IV lasix to BID  6  Hypernatremia, borderline- increase free water flushes with tube feeds to q4hr (from q6hr)  - this should not affect volume status  7  Anemia- hgb stable around 10 past few days  8  COVID 19- severe pathway per critical care team  - currently intubated    Disposition:  Consider increasing lasix per ICU team     SUBJECTIVE:  Patient intubated and sedated  Noted run of monomorphic V tach earlier today      OBJECTIVE:  Current Weight: Weight - Scale: (!) 166 kg (365 lb 4 8 oz)  Vitals:    02/11/21 0934 02/11/21 1000 02/11/21 1100 02/11/21 1203   BP:       BP Location:       Pulse:  (!) 107 105    Resp:  18 17    Temp:       TempSrc:       SpO2: 95% (!) 89% 91% 94%   Weight:       Height:           Intake/Output Summary (Last 24 hours) at 2/11/2021 1257  Last data filed at 2/11/2021 1157  Gross per 24 hour   Intake 2440 ml   Output 1555 ml   Net 885 ml     Due to COVID 19 infection, I examined the patient from outside his room through the glass door  Tahira Ramos discussed with the ICU nurse      General: NAD, obese  Skin: no rash  Respiratory: intubated, not labored  Cardiac: tachycardic on monitor  Extremities: + edema  GI: not distended  Neuro: sedated    Medications:    Current Facility-Administered Medications:     acetaminophen (TYLENOL) oral suspension 650 mg, 650 mg, Per NG Tube, Q4H PRN, Kaley Baez PA-C, 650 mg at 02/08/21 1734    aspirin chewable tablet 81 mg, 81 mg, Oral, Daily, , CRNP, 81 mg at 02/11/21 0846    atorvastatin (LIPITOR) tablet 40 mg, 40 mg, Per NG Tube, HS, Kaley Baez PA-C, 40 mg at 02/10/21 2135    bisacodyl (DULCOLAX) rectal suppository 10 mg, 10 mg, Rectal, Daily PRN, Kaley Baez PA-C    chlorhexidine (PERIDEX) 0 12 % oral rinse 15 mL, 15 mL, Swish & Spit, Q12H [facility], Kaley Baez PA-C, 15 mL at 02/11/21 0846    cholecalciferol (VITAMIN D3) tablet 2,000 Units, 2,000 Units, Per NG Tube, Daily, Kaley Baez PA-C, 2,000 Units at 02/11/21 0846    dexamethasone (DECADRON) injection 4 mg, 4 mg, Intravenous, Daily, , CRNP, 4 mg at 02/11/21 0847    ertapenem (INVanz) 1,000 mg in sodium chloride 0 9 % 50 mL IVPB, 1,000 mg, Intravenous, Q24H, Ronda Tillman PA-C, Last Rate: 100 mL/hr at 02/11/21 0847, 1,000 mg at 02/11/21 0847    fentaNYL 1000 mcg in sodium chloride 0 9% 100mL infusion, 50 mcg/hr, Intravenous, Continuous, , CRNP, Last Rate: 5 mL/hr at 02/10/21 2342, 50 mcg/hr at 02/10/21 2342    fentanyl citrate (PF) 100 MCG/2ML 50 mcg, 50 mcg, Intravenous, Q1H PRN, , CRNP, 50 mcg at 02/11/21 0210    furosemide (LASIX) injection 40 mg, 40 mg, Intravenous, Daily, Shell Willson PA-C, 40 mg at 02/11/21 0845    heparin (porcine) 25,000 units in 0 45% NaCl 250 mL infusion (premix), 3-30 Units/kg/hr (Order-Specific), Intravenous, Titrated, Lotus Stevenson DO, Last Rate: 15 mL/hr at 02/11/21 0122, 12 Units/kg/hr at 02/11/21 0122    heparin (porcine) injection 10,000 Units, 10,000 Units, Intravenous, Q1H PRN, Lida Stevenson DO    heparin (porcine) injection 5,000 Units, 5,000 Units, Intravenous, Q1H PRN, Theo Trevino DO, 5,000 Units at 02/09/21 0528    insulin lispro (HumaLOG) 100 units/mL subcutaneous injection 1-6 Units, 1-6 Units, Subcutaneous, Q6H [facility], 1 Units at 02/07/21 0608 **AND** Fingerstick Glucose (POCT), , , Q6H, KATHY Domingo    levETIRAcetam (KEPPRA) oral solution 750 mg, 750 mg, Oral, Q12H ARACELIS, KATHY Domingo, 750 mg at 02/11/21 0846    magnesium sulfate 2 g/50 mL IVPB (premix) 2 g, 2 g, Intravenous, Once, KATHY Su    melatonin tablet 6 mg, 6 mg, Oral, HS, KATHY Domingo, 6 mg at 02/10/21 2135    metoprolol (LOPRESSOR) injection 2 5 mg, 2 5 mg, Intravenous, Q6H, KATHY Su    midodrine (PROAMATINE) tablet 5 mg, 5 mg, Oral, Q8H, KATHY Domingo, 5 mg at 02/11/21 0845    [COMPLETED] zinc sulfate (ZINCATE) capsule 220 mg, 220 mg, Per NG Tube, Daily, 220 mg at 01/24/21 0824 **FOLLOWED BY** multivitamin with iron-minerals liquid 15 mL, 15 mL, Per NG Tube, Daily, Kamala Pereira PA-C, 15 mL at 02/11/21 0847    omeprazole (PRILOSEC) suspension 2 mg/mL, 20 mg, Oral, Q12H, KATHY Domingo, 20 mg at 02/11/21 0209    polyethylene glycol (MIRALAX) packet 17 g, 17 g, Oral, BID, Kamala Pereira PA-C, Stopped at 02/11/21 0847    senna-docusate sodium (SENOKOT S) 8 6-50 mg per tablet 1 tablet, 1 tablet, Per NG Tube, BID, KATHY Izaguirre, Stopped at 02/11/21 0847    sertraline (ZOLOFT) tablet 50 mg, 50 mg, Oral, HS, KATHY Domingo, 50 mg at 02/10/21 2135    sertraline (ZOLOFT) tablet 75 mg, 75 mg, Oral, Daily, KATHY Izaguirre, 75 mg at 02/11/21 2198    Laboratory Results:  Results from last 7 days   Lab Units 02/11/21  0533 02/10/21  1930 02/10/21  0347 02/09/21  0434 02/08/21  0449 02/07/21  0332 02/06/21  0414 02/05/21  0525   WBC Thousand/uL 9 24  --  9 85 10 37* 12 93* 14 19* 15 08* 21 84*   HEMOGLOBIN g/dL 9 0*  --  9 6* 9 8* 10 1* 9 9* 9 7* 10 3*   HEMATOCRIT % 32 8*  --  33 5* 34 2* 35 6* 34 4* 32 8* 34 7*   PLATELETS Thousands/uL 187  --  201 194 234 213 181 196   POTASSIUM mmol/L 3 4* 3 5 3 1* 3 8 4 2 3 8 3 5 3 5   CHLORIDE mmol/L 106 107 105 107 106 102 105 106   CO2 mmol/L 32 33* 34* 32 33* 32 32 32   BUN mg/dL 70* 71* 72* 64* 60* 59* 52* 54*   CREATININE mg/dL 1 57* 1 63* 1 62* 1 49* 1 53* 1 48* 1 50* 1 68*   CALCIUM mg/dL 8 5 8 5 8 3 8 6 8 1* 8 1* 7 9* 7 7*   MAGNESIUM mg/dL 3 0* 2 6 2 5 2 6 2 4 2 5 1 9  --    PHOSPHORUS mg/dL  --   --  4 5*  --  4 6* 4 1 4 5*  --

## 2021-02-11 NOTE — ASSESSMENT & PLAN NOTE
· Currently remains in sinus rhythm  · Not on AC at home secondary to history of GI bleed  · Cont heparin gtt secondary to elevated D dimer  · Holding cardizem secondary to hypotension  · If BP improves consider restarting Lopressor - currently on midodrine 5 mg q8 hrs   · [id number]

## 2021-02-11 NOTE — PROGRESS NOTES
Progress Note - Tobias Gottlieb 1960, 63 y o  male MRN: [id number]    Unit/Bed#: ICU 08 Encounter: [id number]    Primary Care Provider: Von Fisher DO   Date and time admitted to hospital: 1/17/2021  4:16 PM        * COVID-19  Assessment & Plan  · Confirmed positive 1/17  · Continue Decadron changed to 4mg/daily on 2/9   · VC/Zinc completed   Continue MV  · Continue statin per protocol  · Continue pepcid   · Actemra given 1/28  · Conv Plasma 1/17  · Cont Heparin drip per protocol for elevated D dimer  · Not candidate for remdesivir  · Cont Ertapenum for superimposed ESBL E choli bacterial pneumonia    Pneumonia due to Escherichia coli [facility])  Assessment & Plan  · ID following  · ESBL cont on Ertapenum d#5, total abx d #11  · Wean vent as tolerated    Acute respiratory failure with hypoxia and hypercarbia (HCC)  Assessment & Plan  · Secondary to COVID-19 and superimposed ESBL e coli pneumonia   · Previously he had been intubated on 1/17 and extubated on 1/18, reintubated 2/1  · Vent day #11- Wean vent setting as tolerated currently on AC/VC 18/ 450/ 60%/ 10- patient was on 70% fio2 this AM, weaned down to 60% with spo2 remaining at 93%  · Titrate FiO2 for SpO2 > 90%  · Covid therapy per severe pathway protocol   · ABG as needed  · Goals of care discussion with significant other  · May need to start considering trach/peg eval       Acute kidney injury superimposed on CKD [facility])  Assessment & Plan  Lab Results   Component Value Date    EGFR 47 02/11/2021    EGFR 45 02/10/2021    EGFR 45 02/10/2021    CREATININE 1 57 (H) 02/11/2021    CREATININE 1 63 (H) 02/10/2021    CREATININE 1 62 (H) 02/10/2021     · Creatine improved to 1 62 today which appears to be at baseline   · Diurese PRN   · Renal following  · Cont Strict I/O  · Avoid nephrotoxic meds    Acute metabolic encephalopathy  Assessment & Plan  · Secondary to hypoxia/hypercapnia and history of CVA with attempt to wean sedation and monitor for seizure activity   · Neurology consulted not additional intervention  · EEG- no seizure activity   · CAM-ICU   · Continue Zoloft  · Continue Fentanyl for sedation, patient is awake and following commands  · Able to nod and gesture appropriately     Seizure [facility])  Assessment & Plan  · Patient does not have a seizure history but does have history of CVA in the past  · Neurology consulted and appreciate input, have signed off at this time  · Intubated 2/1 for suspected seizure activity   · Loaded with 2G Keppra and continued on Keppra 750mg BID    · EEG shows no seizure activity- will discuss with neuro need to continue [place] as of 2/11/2021  Assessment & Plan  · Repeat BC negative for growth  · Resolved    Dysphagia  Assessment & Plan  · SLP following and had recommend pureed diet prior to intubation  · Reassess after extubation    Anemia  Assessment & Plan  · No active bleeding  · Trend CBC daily   · hgb slowly drifting down, 9 0 today  · Transfuse for Hgb < 7 0     History of stroke  Assessment & Plan  · On Heparin drip  · No clinical evidence of new stroke    Atrial fibrillation ([address] )  Assessment & Plan  · Currently remains in sinus rhythm  · Not on AC at home secondary to history of GI bleed  · Cont heparin gtt secondary to elevated D dimer  · Holding cardizem secondary to hypotension  · If BP improves consider restarting Lopressor - currently on midodrine 5 mg q8 hrs   · [id number] 3    Depression  Assessment & Plan  · Continue home Zoloft BID     Morbid obesity   Assessment & Plan  · Likely contributing to ANGELINA  · Nutrition c/s when appropriate    Hyperlipidemia  Assessment & Plan  · Continue atorvastatin     Essential hypertension  Assessment & Plan  · Currently off home meds due to hypotension   · BP soft but stable       ----------------------------------------------------------------------------------------  HPI/24hr events: No acute events overnight       Disposition: Continue Critical Care Code Status: Level 1 - Full Code  ---------------------------------------------------------------------------------------  SUBJECTIVE  N/a     Review of Systems   Unable to perform ROS: Intubated     ---------------------------------------------------------------------------------------  OBJECTIVE    Vitals   Vitals:    21 0500 21 0537 21 0600 21 0700   BP:       BP Location:       Pulse: 97  101 105   Resp: (!) 27  (!) 37 19   Temp: (!) 97 2 °F (36 2 °C)  (!) 97 2 °F (36 2 °C) (!) 97 2 °F (36 2 °C)   TempSrc:       SpO2: 95%  93%    Weight:  (!) 166 kg (365 lb 4 8 oz)     Height:         Temp (24hrs), Av 6 °F (36 4 °C), Min:97 °F (36 1 °C), Max:98 4 °F (36 9 °C)  Current: Temperature: (!) 97 2 °F (36 2 °C)  Arterial Line BP: 105/40  Arterial Line MAP (mmHg): 61 mmHg    Respiratory:  SpO2: SpO2: 93 %  Nasal Cannula O2 Flow Rate (L/min): 50 L/min    Invasive/non-invasive ventilation settings   Respiratory    Lab Data (Last 4 hours)    None         O2/Vent Data (Last 4 hours)    None                Physical Exam  Constitutional:       Interventions: He is intubated  HENT:      Head: Normocephalic  Eyes:      Pupils: Pupils are equal, round, and reactive to light  Neck:      Trachea: No tracheal deviation  Cardiovascular:      Rate and Rhythm: Regular rhythm  Tachycardia present  Pulses:           Radial pulses are 1+ on the right side and 1+ on the left side  Dorsalis pedis pulses are 1+ on the right side and 1+ on the left side  Pulmonary:      Effort: Pulmonary effort is normal  He is intubated  Breath sounds: Examination of the right-upper field reveals rhonchi  Rhonchi present  Abdominal:      General: Bowel sounds are normal       Palpations: Abdomen is soft  Tenderness: There is no abdominal tenderness  Musculoskeletal:      Right lower le+ Edema present  Left lower le+ Edema present        Comments: Generalized weakness and deconditioning  Lymphadenopathy:      Cervical: No cervical adenopathy  Skin:     General: Skin is warm and dry  Nails: There is no clubbing  Neurological:      Mental Status: He is alert  GCS: GCS eye subscore is 4  GCS verbal subscore is 1  GCS motor subscore is 6  Psychiatric:         Behavior: Behavior is cooperative           Laboratory and Diagnostics:  Results from last 7 days   Lab Units 02/11/21  0533 02/10/21  0347 02/09/21  0434 02/08/21  0449 02/07/21  0332 02/06/21  0414 02/05/21  0525   WBC Thousand/uL 9 24 9 85 10 37* 12 93* 14 19* 15 08* 21 84*   HEMOGLOBIN g/dL 9 0* 9 6* 9 8* 10 1* 9 9* 9 7* 10 3*   HEMATOCRIT % 32 8* 33 5* 34 2* 35 6* 34 4* 32 8* 34 7*   PLATELETS Thousands/uL 187 201 194 234 213 181 196   NEUTROS PCT %  --   --  68  --   --  92*  --    BANDS PCT % 3 4  --  2 6  --   --    MONOS PCT %  --   --  10  --   --  3*  --    MONO PCT % 5 10  --  7 3*  --  1*     Results from last 7 days   Lab Units 02/11/21  0533 02/10/21  1930 02/10/21  0347 02/09/21  0434 02/08/21  0449 02/07/21  0332 02/06/21  0414 02/05/21  0525   SODIUM mmol/L 145 146* 144 145 144 142 143 143   POTASSIUM mmol/L 3 4* 3 5 3 1* 3 8 4 2 3 8 3 5 3 5   CHLORIDE mmol/L 106 107 105 107 106 102 105 106   CO2 mmol/L 32 33* 34* 32 33* 32 32 32   ANION GAP mmol/L 7 6 5 6 5 8 6 5   BUN mg/dL 70* 71* 72* 64* 60* 59* 52* 54*   CREATININE mg/dL 1 57* 1 63* 1 62* 1 49* 1 53* 1 48* 1 50* 1 68*   CALCIUM mg/dL 8 5 8 5 8 3 8 6 8 1* 8 1* 7 9* 7 7*   GLUCOSE RANDOM mg/dL 126 116 132 122 123 181* 176* 147*   ALT U/L  --   --   --   --   --   --   --  19   AST U/L  --   --   --   --   --   --   --  19   ALK PHOS U/L  --   --   --   --   --   --   --  67   ALBUMIN g/dL  --   --   --   --   --   --   --  2 3*   TOTAL BILIRUBIN mg/dL  --   --   --   --   --   --   --  0 54     Results from last 7 days   Lab Units 02/11/21  0533 02/10/21  1930 02/10/21  0347 02/09/21  0434 02/08/21  0449 02/07/21  7621 02/06/21  5831 MAGNESIUM mg/dL 3 0* 2 6 2 5 2 6 2 4 2 5 1 9   PHOSPHORUS mg/dL  --   --  4 5*  --  4 6* 4 1 4 5*      Results from last 7 days   Lab Units 02/11/21  0533 02/10/21  0512 02/09/21  1749 02/09/21  1129 02/09/21  0434 02/08/21  2228 02/08/21  1405   PTT seconds 84* 72* 72* 67* 59* 68* 94*          Results from last 7 days   Lab Units 02/07/21  0332   LACTIC ACID mmol/L 0 8     ABG:  Results from last 7 days   Lab Units 02/09/21  0733   PH ART  7 389   PCO2 ART mm Hg 54 0*   PO2 ART mm Hg 59 9*   HCO3 ART mmol/L 31 9*   BASE EXC ART mmol/L 5 8   ABG SOURCE  Line, Arterial     VBG:  Results from last 7 days   Lab Units 02/09/21  0733   ABG SOURCE  Line, Arterial     Results from last 7 days   Lab Units 02/07/21  0331 02/06/21  0414   PROCALCITONIN ng/ml 0 45* 0 96*       Micro  Results from last 7 days   Lab Units 02/05/21  2104 02/04/21  1703 02/04/21  1220   BLOOD CULTURE   --   --  No Growth After 5 Days  No Growth After 5 Days  SPUTUM CULTURE   --  Few Colonies of Escherichia coli ESBL*  Few Colonies of   --    GRAM STAIN RESULT   --  1+ Polys  No bacteria seen  --    MRSA CULTURE ONLY  No Methicillin Resistant Staphlyococcus aureus (MRSA) isolated  --   --        EKG: SR   Imaging: I have personally reviewed pertinent reports  Intake and Output  I/O       02/09 0701 - 02/10 0700 02/10 0701 - 02/11 0700 02/11 0701 - 02/12 0700    I V  (mL/kg) 622 5 (3 8) 480 (2 9)     NG/GT 1210 1160     IV Piggyback  350     Feedings 1080 1080     Total Intake(mL/kg) 2912 5 (17 5) 3070 (18 5)     Urine (mL/kg/hr) 1630 (0 4) 1530 (0 4)     Stool  0     Total Output 1630 1530     Net +1282 5 +1540            Unmeasured Stool Occurrence  4 x           Height and Weights   Height: 5' 11" (180 3 cm)  IBW: 75 3 kg  Body mass index is 50 95 kg/m²    Weight (last 2 days)     Date/Time   Weight    02/11/21 0537   (!) 166 (365 3)    02/10/21 0347   (!) 166 (365 08)    02/09/21 0600   (!) 164 (361 55)                Nutrition Diet Orders   (From admission, onward)             Start     Ordered    02/09/21 1109  Diet Enteral/Parenteral; Tube Feeding No Oral Diet; Jevity 1 5; Continuous; 45; Prosource Protein Liquid - Two Packets; 150; Every 6 hours  Diet effective now     Question Answer Comment   Diet Type Enteral/Parenteral    Enteral/Parenteral Tube Feeding No Oral Diet    Tube Feeding Formula: Jevity 1 5    Bolus/Cyclic/Continuous Continuous    Tube Feeding Goal Rate (mL/hr): 45    Prosource Protein Liquid - No Carb Prosource Protein Liquid - Two Packets    Tube Feeding flush (mL): 150    Water flush frequency: Every 6 hours    RD to adjust diet per protocol?  Yes        02/09/21 1108    01/17/21 1719  Room Service  Once     Question:  Type of Service  Answer:  Room Service- Not Appropriate    01/17/21 1718                  Active Medications  Scheduled Meds:  Current Facility-Administered Medications   Medication Dose Route Frequency Provider Last Rate    acetaminophen  650 mg Per NG Tube Q4H PRN Kaley Baez PA-C      aspirin  81 mg Oral Daily Deja Clear, CRNP      atorvastatin  40 mg Per NG Tube HS Kaley Baez PA-C      bisacodyl  10 mg Rectal Daily PRN Kaley Baez PA-C      chlorhexidine  15 mL [facility] [place], [place]      cholecalciferol  2,000 Units Per NG Tube Daily Kaley Baez PA-C      dexamethasone  4 mg Intravenous Daily Deja Clear, CRNP      ertapenem  1,000 mg Intravenous Q24H Ronda Tillman PA-C 1,000 mg (02/10/21 0840)    fentaNYL  50 mcg/hr Intravenous Continuous Deja Clear, CRNP 50 mcg/hr (02/10/21 2342)    fentanyl citrate (PF)  50 mcg Intravenous Q1H PRN Deja Clear, CRNP      furosemide  40 mg Intravenous Daily Shell Willson PA-C      heparin (porcine)  3-30 Units/kg/hr (Order-Specific) Intravenous Titrated Easton Mendoza DO 12 Units/kg/hr (02/11/21 0122)    heparin (porcine)  10,000 Units Intravenous Q1H PRN Lotus Grady DO [place]      heparin (porcine)  5,000 Units Intravenous Q1H PRN Rayna Greenberg DO      insulin lispro  1-6 Units Subcutaneous Q6H [facility] Russell Londono, KATHY      levETIRAcetam  750 mg Oral Q12H [facility] [place]re Mart, CRJOSE DANIEL      melatonin  6 mg Oral HS [facility], CRNP      midodrine  5 mg Oral [place] [address] [place], CRNP      multivitamin with iron-minerals  15 mL Per NG Tube Daily Irma HERBERT Barnes      omeprazole (PRILOSEC) suspension 2 mg/mL  20 mg Oral Q12H [place] Mart, CRNP      polyethylene glycol  17 g Oral BID Irma HERBERT Barnes      potassium chloride  40 mEq Oral Once Syeda Hood, KATHY      senna-docusate sodium  1 tablet Per NG Tube BID [place] Mart, CRJOSE DANIEL      sertraline  50 mg Oral HS [place] Mart, CRNP      sertraline  75 mg Oral Daily [place] ParkKATHY       Continuous Infusions:  fentaNYL, 50 mcg/hr, Last Rate: 50 mcg/hr (02/10/21 2342)  heparin (porcine), 3-30 Units/kg/hr (Order-Specific), Last Rate: 12 Units/kg/hr (02/11/21 0122)      PRN Meds:   acetaminophen, 650 mg, Q4H PRN  bisacodyl, 10 mg, Daily PRN  fentanyl citrate (PF), 50 mcg, Q1H PRN  heparin (porcine), 10,000 Units, Q1H PRN  heparin (porcine), 5,000 Units, Q1H PRN        Invasive Devices Review  Invasive Devices     Peripheral Intravenous Line            Long-Dwell Peripheral IV (Midline) 05/51/64 Left Cephalic 16 days    Peripheral IV 02/08/21 Left Forearm 2 days          Arterial Line            Arterial Line 02/04/21 Radial 7 days          Drain            Urethral Catheter Straight-tip 16 Fr  24 days    NG/OG/Enteral Tube Orogastric 9 days          Airway            ETT  Cuffed; Hi-Lo 8 mm 9 days                Rationale for remaining devices: Critically ill patient   ---------------------------------------------------------------------------------------  Advance Directive and Living Will:      Power of :    POLST: ---------------------------------------------------------------------------------------  Care Time Delivered:   No Critical Care time spent       [facility], CRNP      Portions of the record may have been created with voice recognition software  Occasional wrong word or "sound a like" substitutions may have occurred due to the inherent limitations of voice recognition software    Read the chart carefully and recognize, using context, where substitutions have occurred

## 2021-02-12 DIAGNOSIS — R78.81 MSSA BACTEREMIA: Primary | ICD-10-CM

## 2021-02-12 DIAGNOSIS — B95.61 MSSA BACTEREMIA: Primary | ICD-10-CM

## 2021-02-12 LAB
ANION GAP SERPL CALCULATED.3IONS-SCNC: 7 MMOL/L (ref 4–13)
APTT PPP: 74 SECONDS (ref 23–37)
BUN SERPL-MCNC: 73 MG/DL (ref 5–25)
CALCIUM SERPL-MCNC: 8.6 MG/DL (ref 8.3–10.1)
CHLORIDE SERPL-SCNC: 107 MMOL/L (ref 100–108)
CO2 SERPL-SCNC: 32 MMOL/L (ref 21–32)
CREAT SERPL-MCNC: 1.44 MG/DL (ref 0.6–1.3)
GFR SERPL CREATININE-BSD FRML MDRD: 52 ML/MIN/1.73SQ M
GLUCOSE SERPL-MCNC: 103 MG/DL (ref 65–140)
GLUCOSE SERPL-MCNC: 115 MG/DL (ref 65–140)
GLUCOSE SERPL-MCNC: 122 MG/DL (ref 65–140)
GLUCOSE SERPL-MCNC: 127 MG/DL (ref 65–140)
GLUCOSE SERPL-MCNC: 134 MG/DL (ref 65–140)
MAGNESIUM SERPL-MCNC: 3.2 MG/DL (ref 1.6–2.6)
POTASSIUM SERPL-SCNC: 3.3 MMOL/L (ref 3.5–5.3)
SODIUM SERPL-SCNC: 146 MMOL/L (ref 136–145)

## 2021-02-12 PROCEDURE — 83735 ASSAY OF MAGNESIUM: CPT | Performed by: NURSE PRACTITIONER

## 2021-02-12 PROCEDURE — 99233 SBSQ HOSP IP/OBS HIGH 50: CPT | Performed by: INTERNAL MEDICINE

## 2021-02-12 PROCEDURE — 80048 BASIC METABOLIC PNL TOTAL CA: CPT | Performed by: NURSE PRACTITIONER

## 2021-02-12 PROCEDURE — 82948 REAGENT STRIP/BLOOD GLUCOSE: CPT

## 2021-02-12 PROCEDURE — 94150 VITAL CAPACITY TEST: CPT

## 2021-02-12 PROCEDURE — 85730 THROMBOPLASTIN TIME PARTIAL: CPT | Performed by: NURSE PRACTITIONER

## 2021-02-12 PROCEDURE — 94003 VENT MGMT INPAT SUBQ DAY: CPT

## 2021-02-12 PROCEDURE — 94760 N-INVAS EAR/PLS OXIMETRY 1: CPT

## 2021-02-12 RX ORDER — POTASSIUM CHLORIDE 20MEQ/15ML
40 LIQUID (ML) ORAL ONCE
Status: COMPLETED | OUTPATIENT
Start: 2021-02-12 | End: 2021-02-12

## 2021-02-12 RX ORDER — MIDODRINE HYDROCHLORIDE 5 MG/1
10 TABLET ORAL EVERY 8 HOURS
Status: DISCONTINUED | OUTPATIENT
Start: 2021-02-12 | End: 2021-03-04 | Stop reason: HOSPADM

## 2021-02-12 RX ORDER — DIPHENHYDRAMINE HCL 25 MG
25 TABLET ORAL ONCE
Status: COMPLETED | OUTPATIENT
Start: 2021-02-12 | End: 2021-02-12

## 2021-02-12 RX ORDER — POTASSIUM CHLORIDE 14.9 MG/ML
20 INJECTION INTRAVENOUS ONCE
Status: COMPLETED | OUTPATIENT
Start: 2021-02-12 | End: 2021-02-12

## 2021-02-12 RX ADMIN — MIDODRINE HYDROCHLORIDE 5 MG: 5 TABLET ORAL at 06:19

## 2021-02-12 RX ADMIN — FUROSEMIDE 40 MG: 10 INJECTION, SOLUTION INTRAMUSCULAR; INTRAVENOUS at 08:16

## 2021-02-12 RX ADMIN — POTASSIUM CHLORIDE 40 MEQ: 20 SOLUTION ORAL at 06:19

## 2021-02-12 RX ADMIN — POTASSIUM CHLORIDE 20 MEQ: 14.9 INJECTION, SOLUTION INTRAVENOUS at 06:12

## 2021-02-12 RX ADMIN — SERTRALINE HYDROCHLORIDE 50 MG: 50 TABLET ORAL at 23:09

## 2021-02-12 RX ADMIN — CHLORHEXIDINE GLUCONATE 0.12% ORAL RINSE 15 ML: 1.2 LIQUID ORAL at 08:17

## 2021-02-12 RX ADMIN — LEVETIRACETAM 750 MG: 100 SOLUTION ORAL at 08:17

## 2021-02-12 RX ADMIN — METOPROLOL TARTRATE 2.5 MG: 5 INJECTION INTRAVENOUS at 04:25

## 2021-02-12 RX ADMIN — Medication 20 MG: at 16:00

## 2021-02-12 RX ADMIN — DESMOPRESSIN ACETATE 40 MG: 0.2 TABLET ORAL at 23:09

## 2021-02-12 RX ADMIN — HEPARIN SODIUM 12 UNITS/KG/HR: 10000 INJECTION, SOLUTION INTRAVENOUS at 13:02

## 2021-02-12 RX ADMIN — Medication 6 MG: at 23:09

## 2021-02-12 RX ADMIN — Medication 12.5 MG: at 20:24

## 2021-02-12 RX ADMIN — Medication 2000 UNITS: at 08:16

## 2021-02-12 RX ADMIN — ERTAPENEM SODIUM 1000 MG: 1 INJECTION, POWDER, LYOPHILIZED, FOR SOLUTION INTRAMUSCULAR; INTRAVENOUS at 08:34

## 2021-02-12 RX ADMIN — Medication 50 MCG/HR: at 16:01

## 2021-02-12 RX ADMIN — Medication 20 MG: at 04:28

## 2021-02-12 RX ADMIN — MIDODRINE HYDROCHLORIDE 5 MG: 5 TABLET ORAL at 00:32

## 2021-02-12 RX ADMIN — MIDODRINE HYDROCHLORIDE 10 MG: 5 TABLET ORAL at 23:09

## 2021-02-12 RX ADMIN — CHLORHEXIDINE GLUCONATE 0.12% ORAL RINSE 15 ML: 1.2 LIQUID ORAL at 20:24

## 2021-02-12 RX ADMIN — MULTIVITAMIN 15 ML: LIQUID ORAL at 08:17

## 2021-02-12 RX ADMIN — MIDODRINE HYDROCHLORIDE 10 MG: 5 TABLET ORAL at 16:00

## 2021-02-12 RX ADMIN — SERTRALINE 75 MG: 25 TABLET, FILM COATED ORAL at 08:16

## 2021-02-12 RX ADMIN — FENTANYL CITRATE 50 MCG: 50 INJECTION INTRAMUSCULAR; INTRAVENOUS at 00:29

## 2021-02-12 RX ADMIN — LEVETIRACETAM 750 MG: 100 SOLUTION ORAL at 20:24

## 2021-02-12 RX ADMIN — DIPHENHYDRAMINE HCL 25 MG: 25 TABLET, COATED ORAL at 00:29

## 2021-02-12 RX ADMIN — ASPIRIN 81 MG CHEWABLE TABLET 81 MG: 81 TABLET CHEWABLE at 08:16

## 2021-02-12 RX ADMIN — Medication 12.5 MG: at 11:16

## 2021-02-12 RX ADMIN — DEXAMETHASONE SODIUM PHOSPHATE 4 MG: 4 INJECTION, SOLUTION INTRA-ARTICULAR; INTRALESIONAL; INTRAMUSCULAR; INTRAVENOUS; SOFT TISSUE at 08:16

## 2021-02-12 NOTE — NUTRITION
Goal TF recommendations provided 2/9/21 remain appropriate - Jevity 1 5 @ 60 ml/hr with additional 1 packet of Prosource TF daily to provide 2200 kcal, 103g protein, 1094 ml free water  Additional free water flushes per Nephrology, hypernatremia noted  Will continue to follow

## 2021-02-12 NOTE — ASSESSMENT & PLAN NOTE
· Secondary to hypoxia/hypercapnia and history of CVA   · Neurology consulted no additional intervention  · EEG- no seizure activity   · CAM-ICU   · Continue Zoloft  · Continue Fentanyl for sedation, patient is awake and following commands  · Able to nod and gesture appropriately

## 2021-02-12 NOTE — PLAN OF CARE
Problem: Prexisting or High Potential for Compromised Skin Integrity  Goal: Skin integrity is maintained or improved  Description: INTERVENTIONS:  - Identify patients at risk for skin breakdown  - Assess and monitor skin integrity  - Assess and monitor nutrition and hydration status  - Monitor labs   - Assess for incontinence   - Turn and reposition patient  - Assist with mobility/ambulation  - Relieve pressure over bony prominences  - Avoid friction and shearing  - Provide appropriate hygiene as needed including keeping skin clean and dry  - Evaluate need for skin moisturizer/barrier cream  - Collaborate with interdisciplinary team   - Patient/family teaching  - Consider wound care consult   Outcome: Progressing     Problem: Potential for Falls  Goal: Patient will remain free of falls  Description: INTERVENTIONS:  - Assess patient frequently for physical needs  -  Identify cognitive and physical deficits and behaviors that affect risk of falls  -  [place] fall precautions as indicated by assessment   - Educate patient/family on patient safety including physical limitations  - Instruct patient to call for assistance with activity based on assessment  - Modify environment to reduce risk of injury  - Consider OT/PT consult to assist with strengthening/mobility  Outcome: Progressing     Problem: Nutrition/Hydration-ADULT  Goal: Nutrient/Hydration intake appropriate for improving, restoring or maintaining nutritional needs  Description: Monitor and assess patient's nutrition/hydration status for malnutrition  Collaborate with interdisciplinary team and initiate plan and interventions as ordered  Monitor patient's weight and dietary intake as ordered or per policy  Utilize nutrition screening tool and intervene as necessary  Determine patient's food preferences and provide high-protein, high-caloric foods as appropriate       INTERVENTIONS:  - Monitor oral intake, urinary output, labs, and treatment plans  - Assess nutrition and hydration status and recommend course of action  - Evaluate amount of meals eaten  - Assist patient with eating if necessary   - Allow adequate time for meals  - Recommend/ encourage appropriate diets, oral nutritional supplements, and vitamin/mineral supplements  - Order, calculate, and assess calorie counts as needed  - Recommend, monitor, and adjust tube feedings and TPN/PPN based on assessed needs  - Assess need for intravenous fluids  - Provide specific nutrition/hydration education as appropriate  - Include patient/family/caregiver in decisions related to nutrition  Outcome: Progressing     Problem: NEUROSENSORY - ADULT  Goal: Achieves stable or improved neurological status  Description: INTERVENTIONS  - Monitor and report changes in neurological status  - Monitor vital signs such as temperature, blood pressure, glucose, and any other labs ordered   - Initiate measures to prevent increased intracranial pressure  - Monitor for seizure activity and implement precautions if appropriate      Outcome: Progressing  Goal: Achieves maximal functionality and self care  Description: INTERVENTIONS  - Monitor swallowing and airway patency with patient fatigue and changes in neurological status  - Encourage and assist patient to increase activity and self care     - Encourage visually impaired, hearing impaired and aphasic patients to use assistive/communication devices  Outcome: Progressing     Problem: CARDIOVASCULAR - ADULT  Goal: Maintains optimal cardiac output and hemodynamic stability  Description: INTERVENTIONS:  - Monitor I/O, vital signs and rhythm  - Monitor for S/S and trends of decreased cardiac output  - Administer and titrate ordered vasoactive medications to optimize hemodynamic stability  - Assess quality of pulses, skin color and temperature  - Assess for signs of decreased coronary artery perfusion  - Instruct patient to report change in severity of symptoms  Outcome: Progressing  Goal: Absence of cardiac dysrhythmias or at baseline rhythm  Description: INTERVENTIONS:  - Continuous cardiac monitoring, vital signs, obtain 12 lead EKG if ordered  - Administer antiarrhythmic and heart rate control medications as ordered  - Monitor electrolytes and administer replacement therapy as ordered  Outcome: Progressing     Problem: RESPIRATORY - ADULT  Goal: Achieves optimal ventilation and oxygenation  Description: INTERVENTIONS:  - Assess for changes in respiratory status  - Assess for changes in mentation and behavior  - Position to facilitate oxygenation and minimize respiratory effort  - Oxygen administered by appropriate delivery if ordered  - Initiate smoking cessation education as indicated  - Encourage broncho-pulmonary hygiene including cough, deep breathe, Incentive Spirometry  - Assess the need for suctioning and aspirate as needed  - Assess and instruct to report SOB or any respiratory difficulty  - Respiratory Therapy support as indicated  Outcome: Progressing     Problem: GENITOURINARY - ADULT  Goal: Maintains or returns to baseline urinary function  Description: INTERVENTIONS:  - Assess urinary function  - Encourage oral fluids to ensure adequate hydration if ordered  - Administer IV fluids as ordered to ensure adequate hydration  - Administer ordered medications as needed  - Offer frequent toileting  - Follow urinary retention protocol if ordered  Outcome: Progressing  Goal: Absence of urinary retention  Description: INTERVENTIONS:  - Assess patients ability to void and empty bladder  - Monitor I/O  - Bladder scan as needed  - Discuss with physician/AP medications to alleviate retention as needed  - Discuss catheterization for long term situations as appropriate  Outcome: Progressing  Goal: Urinary catheter remains patent  Description: INTERVENTIONS:  - Assess patency of urinary catheter  - If patient has a chronic bo, consider changing catheter if non-functioning  - Follow guidelines for intermittent irrigation of non-functioning urinary catheter  Outcome: Progressing     Problem: METABOLIC, FLUID AND ELECTROLYTES - ADULT  Goal: Electrolytes maintained within normal limits  Description: INTERVENTIONS:  - Monitor labs and assess patient for signs and symptoms of electrolyte imbalances  - Administer electrolyte replacement as ordered  - Monitor response to electrolyte replacements, including repeat lab results as appropriate  - Instruct patient on fluid and nutrition as appropriate  Outcome: Progressing  Goal: Fluid balance maintained  Description: INTERVENTIONS:  - Monitor labs   - Monitor I/O and WT  - Instruct patient on fluid and nutrition as appropriate  - Assess for signs & symptoms of volume excess or deficit  Outcome: Progressing  Goal: Glucose maintained within target range  Description: INTERVENTIONS:  - Monitor Blood Glucose as ordered  - Assess for signs and symptoms of hyperglycemia and hypoglycemia  - Administer ordered medications to maintain glucose within target range  - Assess nutritional intake and initiate nutrition service referral as needed  Outcome: Progressing     Problem: MUSCULOSKELETAL - ADULT  Goal: Maintain or return mobility to safest level of function  Description: INTERVENTIONS:  - Assess patient's ability to carry out ADLs; assess patient's baseline for ADL function and identify physical deficits which impact ability to perform ADLs (bathing, care of mouth/teeth, toileting, grooming, dressing, etc )  - Assess/evaluate cause of self-care deficits   - Assess range of motion  - Assess patient's mobility  - Assess patient's need for assistive devices and provide as appropriate  - Encourage maximum independence but intervene and supervise when necessary  - Involve family in performance of ADLs  - Assess for home care needs following discharge   - Consider OT consult to assist with ADL evaluation and planning for discharge  - Provide patient education as appropriate  Outcome: Progressing  Goal: Maintain proper alignment of affected body part  Description: INTERVENTIONS:  - Support, maintain and protect limb and body alignment  - Provide patient/ family with appropriate education  Outcome: Progressing     Problem: INFECTION - ADULT  Goal: Absence or prevention of progression during hospitalization  Description: INTERVENTIONS:  - Assess and monitor for signs and symptoms of infection  - Monitor lab/diagnostic results  - Monitor all insertion sites, i e  indwelling lines, tubes, and drains  - Monitor endotracheal if appropriate and nasal secretions for changes in amount and color  - [place] appropriate cooling/warming therapies per order  - Administer medications as ordered  - Instruct and encourage patient and family to use good hand hygiene technique  - Identify and instruct in appropriate isolation precautions for identified infection/condition  Outcome: Progressing     Problem: DISCHARGE PLANNING  Goal: Discharge to home or other facility with appropriate resources  Description: INTERVENTIONS:  - Identify barriers to discharge w/patient and caregiver  - Arrange for needed discharge resources and transportation as appropriate  - Identify discharge learning needs (meds, wound care, etc )  - Arrange for interpretive services to assist at discharge as needed  - Refer to Case Management Department for coordinating discharge planning if the patient needs post-hospital services based on physician/advanced practitioner order or complex needs related to functional status, cognitive ability, or social support system  Outcome: Progressing     Problem: Knowledge Deficit  Goal: Patient/family/caregiver demonstrates understanding of disease process, treatment plan, medications, and discharge instructions  Description: Complete learning assessment and assess knowledge base    Interventions:  - Provide teaching at level of understanding  - Provide teaching via preferred learning methods  Outcome: Progressing     Problem: SAFETY,RESTRAINT: NV/NON-SELF DESTRUCTIVE BEHAVIOR  Goal: Remains free of harm/injury (restraint for non violent/non self-detsructive behavior)  Description: INTERVENTIONS:  - Instruct patient/family regarding restraint use   - Assess and monitor physiologic and psychological status   - Provide interventions and comfort measures to meet assessed patient needs   - Identify and implement measures to help patient regain control  - Assess readiness for release of restraint   Outcome: Progressing  Goal: Returns to optimal restraint-free functioning  Description: INTERVENTIONS:  - Assess the patient's behavior and symptoms that indicate continued need for restraint  - Identify and implement measures to help patient regain control  - Assess readiness for release of restraint   Outcome: Progressing

## 2021-02-12 NOTE — QUICK NOTE
Bedside report received.  Assessment complete.  A&O x 4. Patient calls appropriately.  Patient bed bound with max assist. Bed alarm on.   Patient has 2/10 pain. Pain managed with prescribed medications.  Denies N&V. 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 Surgical dressing and ace wrap placed by wound care to R AKA. See skin note for more details   + void, + flatus, - BM.  Patient denies SOB.  SCD's off.  Patient with 2 guards at bedside.   Review plan with of care with patient. Call light and personal belongings with in reach. Hourly rounding in place. All needs met at this time.   Updated patients wife, Real Doyne on patients progress via telephone

## 2021-02-12 NOTE — ASSESSMENT & PLAN NOTE
· This morning SR with multiple PVCs  · Patient had an episode of sustained wide QRS tachycardia yesterday - received IV Mg & IV BB  · Not on AC at home secondary to history of GI bleed  · Cont heparin gtt secondary to elevated D dimer  · Holding cardizem secondary to hypotension  · Restarted lopressor IV 2 5 mg q 6 hrs to help with rhythm control, if BP remains stable would start PO dosing   · [id number]

## 2021-02-12 NOTE — RESPIRATORY THERAPY NOTE
02/11/21 2005   Vent Information   Vent    Vent type Conroy G5   Conroy G5 Vent Mode P-CMV   $ Vital Capacity Mech/Peak Flow Yes   $ Pulse Oximetry Spot Check Charge Completed   SpO2 96 %   P-CMV Settings   Resp Rate (BPM) 18 BPM   Pressure Control/Pinsp (cmH20) 18 cmH20   Insp Time (S) 0 9 sec   FiO2 (%) 60 %   PEEP (cmH2O) 10 cmH2O   I:E Ratio  1:2 7   Insp Resistance 7   P-ramp (ms) 50 (ms)   Flow Trigger (LPM) 3 LPM   Humidification Heater   Heater Temp 98 6 °F (37 °C)   P-CMV Actuals   Resp Rate (BPM) 20 BPM   VT (mL) 491 mL   MV 9 2   MAP (cmH2O) 16 cmH2O   Peak Pressure (cmH2O) 29 cmH2O   I:E Ratio (Obs) 1:2 7   Static Compliance (mL/cmH2O) 42 9 mL/cmH2O   Plateau Pressure (BVG6X) 25 cmH2O   Heater Temperature (Obs) 98 4 °F (36 9 °C)   P-CMV Alarms    High Peak Pressure (cmH2O) 45 cmH2O   Low Pressure (cmH2O) 5 cm H2O   High Resp Rate (BPM) 40 BPM   Low Resp Rate (BPM) 8 BPM   High MV (L/min) 20 L/min   Low MV (L/min) 4 L/min   High Spont VTE (mL) 800 mL   Low Spont VTE (mL) 250 mL   ETT  Cuffed; Hi-Lo 8 mm   Placement Date/Time: 02/01/21 c) 0592   Type: Cuffed; Hi-Lo  Tube Size: 8 mm  Location: Oral  Insertion attempts: 1  Placement Verification: Chest x-ray;End tidal CO2;Symmetrical chest wall movement  Secured at (cm): 23   Secured at (cm) 24   Measured from Lips   Secured Location Left   Secured by [employer] tube rosales   Site Condition Dry   Cuff Pressure (cm H2O) 32 cm H2O   HI-LO Suction  Intermittent suction   HI-LO Secretions Small   HI-LO Intervention Patent   RT Ventilator Management Note  Hermann Roque 63 y o  male MRN: [id number]  Unit/Bed#: ICU 08 Encounter: [id number]      Daily Screen       2/11/2021  0934 2/11/2021  1203          Patient safety screen outcome[de-identified]  Failed  Failed      Not Ready for Weaning due to[de-identified]  Underline problem not resolved;PEEP > 8cmH2O  Underline problem not resolved;FiO2 >60%;PEEP > 8cmH2O              Physical Exam:          Resp Comments: Pt remains intubated with size 8 0 ETT secured [email or web address], mechanically ventilated on Conroy G% with settings of P-CMV 18/18/10/60%  Kailey well  Will continue to monitor

## 2021-02-12 NOTE — CASE MANAGEMENT
Patient remains medically unstable for D/C at this time  Patient was intubated 1/17 and extubated 1/18, but then required reintubation on 2/1   Patient continues to require mechanical ventilation with vent settings AC/PC 16/ 18/ 70%/ 10   Critical care reported that they have started conversations with regards to consideration of trach/peg  CM dept will continue to follow for discharge planning needs

## 2021-02-12 NOTE — PROGRESS NOTES
Progress Note - Eleazar Espinoza 1960, 63 y o  male MRN: [id number]    Unit/Bed#: ICU 08 Encounter: [id number]    Primary Care Provider: Santana Bender DO   Date and time admitted to hospital: 1/17/2021  4:16 PM    * COVID-19  Assessment & Plan  · Confirmed positive 1/17  · Continue Decadron changed to 4mg/daily on 2/9, continue to wean, considering decreasing to 3 mg daily   · VC/Zinc completed   Continue MV  · Continue statin per protocol  · Continue pepcid   · Actemra given 1/28  · Conv Plasma 1/17  · Cont Heparin drip per protocol for elevated D dimer  · Not candidate for remdesivir  · Cont Ertapenum for superimposed ESBL E choli bacterial pneumonia    Pneumonia due to Escherichia coli [facility])  Assessment & Plan  · ID was following but signed off, will clarify length of treatment with Ertapenum   · ESBL cont on Ertapenum d#6, total abx d #12  · Wean vent as tolerated    Acute respiratory failure with hypoxia and hypercarbia ([address] )  Assessment & Plan  · Secondary to COVID-19 and superimposed ESBL e coli pneumonia   · Previously he had been intubated on 1/17 and extubated on 1/18, reintubated 2/1  · Vent day #12- Wean vent setting as tolerated currently on AC/PC 16/ 18/ 70%/ 10  · Titrate FiO2 for SpO2 > 90%  · Covid therapy per severe pathway protocol   · ABG as needed  · Goals of care discussion with significant other ongoing   · Started conversations with regards to consideration of trach/peg       Acute kidney injury superimposed on CKD [facility])  Assessment & Plan  Lab Results   Component Value Date    EGFR 52 02/12/2021    EGFR 47 02/11/2021    EGFR 45 02/10/2021    CREATININE 1 44 (H) 02/12/2021    CREATININE 1 57 (H) 02/11/2021    CREATININE 1 63 (H) 02/10/2021     · Creatine improved to 1 44 today which appears to be at baseline   · Continue daily lasix with PRN dosing to maintain euvolemic/negative 500cc  · Renal following  · Cont Strict I/O  · Avoid nephrotoxic meds    Acute metabolic encephalopathy  Assessment & Plan  · Secondary to hypoxia/hypercapnia and history of CVA   · Neurology consulted no additional intervention  · EEG- no seizure activity   · CAM-ICU   · Continue Zoloft  · Continue Fentanyl for sedation, patient is awake and following commands  · Able to nod and gesture appropriately     Seizure [facility])  Assessment & Plan  · Patient does not have a seizure history but does have history of CVA in the past  · Neurology consulted and appreciate input, have signed off at this time  · Intubated 2/1 for suspected seizure activity   · Continue on Keppra 750mg BID    · EEG - no seizures     Dysphagia  Assessment & Plan  · SLP following and had recommend pureed diet prior to intubation  · Reassess after extubation    Anemia  Assessment & Plan  · No active bleeding  · Trend CBC daily   · hgb slowly drifting down, 9 0 on 2/11    · Transfuse for Hgb < 7 0     History of stroke  Assessment & Plan  · On Heparin drip  · No clinical evidence of new stroke    Atrial fibrillation ([address] )  Assessment & Plan  · This morning SR with multiple PVCs  · Patient had an episode of sustained wide QRS tachycardia yesterday - received IV Mg & IV BB  · Not on AC at home secondary to history of GI bleed  · Cont heparin gtt secondary to elevated D dimer  · Holding cardizem secondary to hypotension  · Restarted lopressor IV 2 5 mg q 6 hrs to help with rhythm control, if BP remains stable would start PO dosing   · [id number] 3    Depression  Assessment & Plan  · Continue home Zoloft BID     Morbid obesity   Assessment & Plan  · Likely contributing to ANGELINA  · Nutrition c/s when appropriate    Hyperlipidemia  Assessment & Plan  · Continue atorvastatin     Essential hypertension  Assessment & Plan  · Restarting BB   · Continue lasix   · BP soft but stable - increase midodrine 10 mg       ----------------------------------------------------------------------------------------  HPI/24hr events: No acute events overnight  Disposition: Continue Critical Care   Code Status: Level 1 - Full Code  ---------------------------------------------------------------------------------------  SUBJECTIVE  N/a     Review of Systems   Unable to perform ROS: Intubated     ---------------------------------------------------------------------------------------  OBJECTIVE    Vitals   Vitals:    21 0600 21 0652 21 0700 21 0745   BP: 96/58  106/66    Pulse: 83  89    Resp: 18 19 (!) 23    Temp: (!) 97 2 °F (36 2 °C)  (!) 97 °F (36 1 °C)    TempSrc:       SpO2: 94%  93% 100%   Weight:       Height:         Temp (24hrs), Av 2 °F (36 2 °C), Min:97 °F (36 1 °C), Max:97 5 °F (36 4 °C)  Current: Temperature: (!) 97 °F (36 1 °C)  Arterial Line BP: 114/55  Arterial Line MAP (mmHg): 75 mmHg    Respiratory:  SpO2: SpO2: 100 %  Nasal Cannula O2 Flow Rate (L/min): 50 L/min    Invasive/non-invasive ventilation settings   Respiratory    Lab Data (Last 4 hours)    None         O2/Vent Data (Last 4 hours)       0745          Patient safety screen outcome: Failed                   Physical Exam  Constitutional:       Interventions: He is intubated  HENT:      Head: Normocephalic  Eyes:      Pupils: Pupils are equal, round, and reactive to light  Neck:      Trachea: No tracheal deviation  Cardiovascular:      Rate and Rhythm: Normal rate and regular rhythm  Pulses:           Radial pulses are 1+ on the right side and 1+ on the left side  Dorsalis pedis pulses are 1+ on the right side and 1+ on the left side  Pulmonary:      Effort: He is intubated  Breath sounds: Examination of the right-upper field reveals rhonchi  Examination of the left-upper field reveals rhonchi  Rhonchi present  Abdominal:      General: Bowel sounds are normal       Palpations: Abdomen is soft  Tenderness: There is no abdominal tenderness  Musculoskeletal:      Right lower le+ Edema present        Left lower le+ Edema present  Comments: Generalized weakness & deconditioning  Lymphadenopathy:      Cervical: No cervical adenopathy  Skin:     General: Skin is warm and dry  Nails: There is no clubbing  Neurological:      GCS: GCS eye subscore is 4  GCS verbal subscore is 1  GCS motor subscore is 6  Psychiatric:         Behavior: Behavior is cooperative           Laboratory and Diagnostics:  Results from last 7 days   Lab Units 02/11/21  0533 02/10/21  0347 02/09/21  0434 02/08/21 0449 02/07/21  0332 02/06/21  0414   WBC Thousand/uL 9 24 9 85 10 37* 12 93* 14 19* 15 08*   HEMOGLOBIN g/dL 9 0* 9 6* 9 8* 10 1* 9 9* 9 7*   HEMATOCRIT % 32 8* 33 5* 34 2* 35 6* 34 4* 32 8*   PLATELETS Thousands/uL 187 201 194 234 213 181   NEUTROS PCT %  --   --  68  --   --  92*   BANDS PCT % 3 4  --  2 6  --    MONOS PCT %  --   --  10  --   --  3*   MONO PCT % 5 10  --  7 3*  --      Results from last 7 days   Lab Units 02/12/21  0433 02/11/21  0533 02/10/21  1930 02/10/21  0347 02/09/21  0434 02/08/21  0449 02/07/21  0332   SODIUM mmol/L 146* 145 146* 144 145 144 142   POTASSIUM mmol/L 3 3* 3 4* 3 5 3 1* 3 8 4 2 3 8   CHLORIDE mmol/L 107 106 107 105 107 106 102   CO2 mmol/L 32 32 33* 34* 32 33* 32   ANION GAP mmol/L 7 7 6 5 6 5 8   BUN mg/dL 73* 70* 71* 72* 64* 60* 59*   CREATININE mg/dL 1 44* 1 57* 1 63* 1 62* 1 49* 1 53* 1 48*   CALCIUM mg/dL 8 6 8 5 8 5 8 3 8 6 8 1* 8 1*   GLUCOSE RANDOM mg/dL 134 126 116 132 122 123 181*     Results from last 7 days   Lab Units 02/12/21  0433 02/11/21  0533 02/10/21  1930 02/10/21  0347 02/09/21  0434 02/08/21 0449 02/07/21  0332 02/06/21  0414   MAGNESIUM mg/dL 3 2* 3 0* 2 6 2 5 2 6 2 4 2 5 1 9   PHOSPHORUS mg/dL  --   --   --  4 5*  --  4 6* 4 1 4 5*      Results from last 7 days   Lab Units 02/12/21  0433 02/11/21  0533 02/10/21  0512 02/09/21  1749 02/09/21  1129 02/09/21  0434 02/08/21  2228   PTT seconds 74* 84* 72* 72* 67* 59* 68*          Results from last 7 days   Lab Units 02/07/21  0332   LACTIC ACID mmol/L 0 8     ABG:  Results from last 7 days   Lab Units 02/09/21  0733   PH ART  7 389   PCO2 ART mm Hg 54 0*   PO2 ART mm Hg 59 9*   HCO3 ART mmol/L 31 9*   BASE EXC ART mmol/L 5 8   ABG SOURCE  Line, Arterial     VBG:  Results from last 7 days   Lab Units 02/09/21  0733   ABG SOURCE  Line, Arterial     Results from last 7 days   Lab Units 02/07/21  0331 02/06/21  0414   PROCALCITONIN ng/ml 0 45* 0 96*       Micro  Results from last 7 days   Lab Units 02/05/21  2104   MRSA CULTURE ONLY  No Methicillin Resistant Staphlyococcus aureus (MRSA) isolated       EKG: SR with frequent PVCs  Imaging: I have personally reviewed pertinent reports  Intake and Output  I/O       02/10 0701 - 02/11 0700 02/11 0701 - 02/12 0700 02/12 0701 - 02/13 0700    P  O   0     I V  (mL/kg) 480 (2 9) 524 (3 2)     NG/GT 1010 750     IV Piggyback 350 50     Feedings 1080 960     Total Intake(mL/kg) 2920 (17 6) 2284 (13 8)     Urine (mL/kg/hr) 1530 (0 4) 1750 (0 4)     Emesis/NG output  0     Stool 0 0     Total Output 1530 1750     Net +1390 +534            Unmeasured Stool Occurrence 4 x 3 x           Height and Weights   Height: 5' 11" (180 3 cm)  IBW: 75 3 kg  Body mass index is 50 95 kg/m²    Weight (last 2 days)     Date/Time   Weight    02/11/21 0537   (!) 166 (365 3)    02/10/21 0347   (!) 166 (365 08)                Nutrition       Diet Orders   (From admission, onward)             Start     Ordered    02/11/21 1143  Diet Enteral/Parenteral; Tube Feeding No Oral Diet; Jevity 1 5; Continuous; 45; Prosource Protein Liquid - Two Packets; 150; Every 4 hours  Diet effective now     Question Answer Comment   Diet Type Enteral/Parenteral    Enteral/Parenteral Tube Feeding No Oral Diet    Tube Feeding Formula: Jevity 1 5    Bolus/Cyclic/Continuous Continuous    Tube Feeding Goal Rate (mL/hr): 45    Prosource Protein Liquid - No Carb Prosource Protein Liquid - Two Packets    Tube Feeding flush (mL): 150 Water flush frequency: Every 4 hours    RD to adjust diet per protocol?  Yes        02/11/21 1143    01/17/21 1719  Room Service  Once     Question:  Type of Service  Answer:  Room Service- Not Appropriate    01/17/21 1718                  Active Medications  Scheduled Meds:  Current Facility-Administered Medications   Medication Dose Route Frequency Provider Last Rate    acetaminophen  650 mg Per NG Tube Q4H PRN Deangelo Liz PA-C      aspirin  81 mg Oral Daily KATHY Sheppard      atorvastatin  40 mg Per NG Tube HS Deangelo Liz PA-C      bisacodyl  10 mg Rectal Daily PRN Deangelo Liz PA-C      chlorhexidine  15 mL [facility] Deangelo Liz, [place]      cholecalciferol  2,000 Units Per NG Tube Daily Deangelo Liz PA-C      dexamethasone  4 mg Intravenous Daily KATHY Sheppard      ertapenem  1,000 mg Intravenous Q24H Manolo Chaparro PA-C Stopped (02/11/21 1400)    fentaNYL  50 mcg/hr Intravenous Continuous ElizabethhiKENIA LopezNP 50 mcg/hr (02/11/21 1935)    fentanyl citrate (PF)  50 mcg Intravenous Q1H PRN KATHY Sheppard      furosemide  40 mg Intravenous Daily Shell Willson PA-C      heparin (porcine)  3-30 Units/kg/hr (Order-Specific) Intravenous Titrated Alana Durie, DO 12 Units/kg/hr (02/11/21 1935)    heparin (porcine)  10,000 Units Intravenous Q1H PRN Alana Durie, DO      heparin (porcine)  5,000 Units Intravenous Q1H PRN Alana Durie, DO      insulin lispro  1-6 Units Subcutaneous Q6H [facility] KATHY Sheppard      levETIRAcetam  750 mg Oral Q12H [facility] KATHY Sheppard      melatonin  6 mg Oral HS KATHY Sheppard      metoprolol  2 5 mg Intravenous Q6H Dorethea SolanKATHY      midodrine  10 mg Oral Q8H TiaraKATHY Frias      multivitamin with iron-minerals  15 mL Per NG Tube Daily Deangelo Liz PA-C      omeprazole (PRILOSEC) suspension 2 mg/mL  20 mg Oral Q12H Cosme Casiano KATHY Cesar      polyethylene glycol  17 g Oral BID Todd Barney PA-C      senna-docusate sodium  1 tablet Per NG Tube BID KATHY Esteves      sertraline  50 mg Oral HS KATHY Esteves      sertraline  75 mg Oral Daily KATHY Esteves       Continuous Infusions:  fentaNYL, 50 mcg/hr, Last Rate: 50 mcg/hr (02/11/21 1935)  heparin (porcine), 3-30 Units/kg/hr (Order-Specific), Last Rate: 12 Units/kg/hr (02/11/21 1935)      PRN Meds:   acetaminophen, 650 mg, Q4H PRN  bisacodyl, 10 mg, Daily PRN  fentanyl citrate (PF), 50 mcg, Q1H PRN  heparin (porcine), 10,000 Units, Q1H PRN  heparin (porcine), 5,000 Units, Q1H PRN        Invasive Devices Review  Invasive Devices     Peripheral Intravenous Line            Long-Dwell Peripheral IV (Midline) 38/44/77 Left Cephalic 17 days    Peripheral IV 02/08/21 Left Forearm 3 days          Arterial Line            Arterial Line 02/04/21 Radial 8 days          Drain            Urethral Catheter Straight-tip 16 Fr  25 days    NG/OG/Enteral Tube Orogastric 10 days          Airway            ETT  Cuffed; Hi-Lo 8 mm 10 days                Rationale for remaining devices: critically ill patient   ---------------------------------------------------------------------------------------  Advance Directive and Living Will:      Power of :    POLST:    ---------------------------------------------------------------------------------------  Care Time Delivered:   No Critical Care time spent       [facility]      Portions of the record may have been created with voice recognition software  Occasional wrong word or "sound a like" substitutions may have occurred due to the inherent limitations of voice recognition software    Read the chart carefully and recognize, using context, where substitutions have occurred

## 2021-02-12 NOTE — ASSESSMENT & PLAN NOTE
· ID was following but signed off, will clarify length of treatment with Ertapenum   · ESBL cont on Ertapenum d#6, total abx d #12  · Wean vent as tolerated

## 2021-02-12 NOTE — ASSESSMENT & PLAN NOTE
· Patient does not have a seizure history but does have history of CVA in the past  · Neurology consulted and appreciate input, have signed off at this time  · Intubated 2/1 for suspected seizure activity   · Continue on Keppra 750mg BID    · EEG - no seizures

## 2021-02-12 NOTE — ASSESSMENT & PLAN NOTE
Lab Results   Component Value Date    EGFR 52 02/12/2021    EGFR 47 02/11/2021    EGFR 45 02/10/2021    CREATININE 1 44 (H) 02/12/2021    CREATININE 1 57 (H) 02/11/2021    CREATININE 1 63 (H) 02/10/2021     · Creatine improved to 1 44 today which appears to be at baseline   · Continue daily lasix with PRN dosing to maintain euvolemic/negative 500cc  · Renal following  · Cont Strict I/O  · Avoid nephrotoxic meds

## 2021-02-12 NOTE — ASSESSMENT & PLAN NOTE
· No active bleeding  · Trend CBC daily   · hgb slowly drifting down, 9 0 on 2/11    · Transfuse for Hgb < 7 0

## 2021-02-12 NOTE — ASSESSMENT & PLAN NOTE
· Secondary to COVID-19 and superimposed ESBL e coli pneumonia   · Previously he had been intubated on 1/17 and extubated on 1/18, reintubated 2/1  · Vent day #12- Wean vent setting as tolerated currently on AC/PC 16/ 18/ 70%/ 10  · Titrate FiO2 for SpO2 > 90%  · Covid therapy per severe pathway protocol   · ABG as needed  · Goals of care discussion with significant other ongoing   · Started conversations with regards to consideration of trach/peg

## 2021-02-12 NOTE — PROGRESS NOTES
NEPHROLOGY PROGRESS NOTE   Princess Zepeda 63 y o  male MRN: [id number]  Unit/Bed#: ICU 08 Encounter: [id number]  Reason for Consult: PATRICK/CKD    ASSESSMENT/PLAN:  1  Acute Kidney Injury, POA- secondary to COVID 19 cytokine release + contrast induced nephropathy (1/17) +/- vancomycin toxicity  - UA: large blood, innumerable RBC, fine and coarse granular casts, 2+ protein  - currently on lasix 40mg IV daily  - good urine output, monitor  - creatinine stable around 1 5  - avoid hypotension, avoid nephrotoxins  2  Chronic Kidney Disease stage III- Baseline creatinine 1 1-1 3   May have higher baseline creatinine upon discharge   Follows with a nephrologist in the [facility] area  History of dialysis dependence in 2019  3  Hypokalemia- replete as needed  4  Hypotension- off pressors now per ICU team  - continue midodrine 5mg TID  5  Volume Status- difficult examination from outside room  - currently on daily lasix  - consider increasing IV lasix to BID  6  Hypernatremia, borderline- increase free water flushes with tube feeds to 200ml q4hr  - this should not affect volume status  7  Anemia- hgb stable around 10 past few days  8  COVID 19- severe pathway per critical care team  - currently intubated    Disposition:  Diuretics per critical care but not opposed to increasing diuretics  Discussed with critical care AP  SUBJECTIVE:  Patient intubated and sedated      OBJECTIVE:  Current Weight: Weight - Scale: (!) 166 kg (365 lb 4 8 oz)  Vitals:    02/12/21 1109 02/12/21 1116 02/12/21 1200 02/12/21 1300   BP:  121/76 117/68 114/69   Pulse:  86 86 97   Resp: 18  (!) 29 (!) 30   Temp:   (!) 97 3 °F (36 3 °C) (!) 97 3 °F (36 3 °C)   TempSrc:       SpO2:   96% 95%   Weight:       Height:           Intake/Output Summary (Last 24 hours) at 2/12/2021 1312  Last data filed at 2/12/2021 1200  Gross per 24 hour   Intake 2050 ml   Output 1935 ml   Net 115 ml     Due to COVID 19 infection, I examined the patient from outside his room through the glass door   Case discussed with the ICU AP      General: NAD  Skin: no rash  Respiratory: intubated  Cardiac: regular on monitor  Extremities: covered  GI: obese, rotund  Neuro: sedated    Medications:    Current Facility-Administered Medications:     acetaminophen (TYLENOL) oral suspension 650 mg, 650 mg, Per NG Tube, Q4H PRN, Todd Barney PA-C, 650 mg at 02/08/21 1734    aspirin chewable tablet 81 mg, 81 mg, Oral, Daily, , CRNP, 81 mg at 02/12/21 0816    atorvastatin (LIPITOR) tablet 40 mg, 40 mg, Per NG Tube, HS, Todd Barney PA-C, 40 mg at 02/11/21 2245    bisacodyl (DULCOLAX) rectal suppository 10 mg, 10 mg, Rectal, Daily PRN, Todd Barney PA-C    chlorhexidine (PERIDEX) 0 12 % oral rinse 15 mL, 15 mL, Swish & Spit, Q12H [address], Todd Barney PA-C, 15 mL at 02/12/21 8646    cholecalciferol (VITAMIN D3) tablet 2,000 Units, 2,000 Units, Per NG Tube, Daily, Todd Barney PA-C, 2,000 Units at 02/12/21 0816    dexamethasone (DECADRON) injection 4 mg, 4 mg, Intravenous, Daily, , CRNP, 4 mg at 02/12/21 0816    ertapenem (INVanz) 1,000 mg in sodium chloride 0 9 % 50 mL IVPB, 1,000 mg, Intravenous, Q24H, Theresa Naidu PA-C, Stopped at 02/12/21 5292    fentaNYL 1000 mcg in sodium chloride 0 9% 100mL infusion, 50 mcg/hr, Intravenous, Continuous, , CRNP, Last Rate: 5 mL/hr at 02/11/21 1935, 50 mcg/hr at 02/11/21 1935    fentanyl citrate (PF) 100 MCG/2ML 50 mcg, 50 mcg, Intravenous, Q1H PRN, , CRNP, 50 mcg at 02/12/21 0029    furosemide (LASIX) injection 40 mg, 40 mg, Intravenous, Daily, Shell Willson PA-C, 40 mg at 02/12/21 0816    heparin (porcine) 25,000 units in 0 45% NaCl 250 mL infusion (premix), 3-30 Units/kg/hr (Order-Specific), Intravenous, Titrated, Jana Stevenson DO, Last Rate: 15 mL/hr at 02/12/21 1302, 12 Units/kg/hr at 02/12/21 1302    heparin (porcine) injection 10,000 Units, 10,000 Units, Intravenous, Q1H PRN, [place] Fuss, DO    heparin (porcine) injection 5,000 Units, 5,000 Units, Intravenous, Q1H PRN, [place] Fuss, DO, 5,000 Units at 02/09/21 0528    insulin lispro (HumaLOG) 100 units/mL subcutaneous injection 1-6 Units, 1-6 Units, Subcutaneous, Q6H [address], 1 Units at 02/07/21 0608 **AND** Fingerstick Glucose (POCT), , , Q6H, KATHY Domingo    levETIRAcetam (KEPPRA) oral solution 750 mg, 750 mg, Oral, Q12H ARACELIS, KATHY Domingo, 750 mg at 02/12/21 0817    melatonin tablet 6 mg, 6 mg, Oral, HS, KATHY Domingo, 6 mg at 02/11/21 2244    metoprolol tartrate (LOPRESSOR) partial tablet 12 5 mg, 12 5 mg, Oral, Q12H [address], KATHY Odell, 12 5 mg at 02/12/21 1116    midodrine (PROAMATINE) tablet 10 mg, 10 mg, Oral, Q8H, KATHY Odell    [COMPLETED] zinc sulfate (ZINCATE) capsule 220 mg, 220 mg, Per NG Tube, Daily, 220 mg at 01/24/21 0824 **FOLLOWED BY** multivitamin with iron-minerals liquid 15 mL, 15 mL, Per NG Tube, Daily, Irma Barnes PA-C, 15 mL at 02/12/21 0817    omeprazole (PRILOSEC) suspension 2 mg/mL, 20 mg, Oral, Q12H, KATHY Domingo, 20 mg at 02/12/21 0428    polyethylene glycol (MIRALAX) packet 17 g, 17 g, Oral, BID, Irma Barnes PA-C, Stopped at 02/11/21 0847    senna-docusate sodium (SENOKOT S) 8 6-50 mg per tablet 1 tablet, 1 tablet, Per NG Tube, BID, KATHY Benjamin, Stopped at 02/11/21 0847    sertraline (ZOLOFT) tablet 50 mg, 50 mg, Oral, HS, KATHY Domingo, 50 mg at 02/11/21 2244    sertraline (ZOLOFT) tablet 75 mg, 75 mg, Oral, Daily, KATHY Benjamin, 75 mg at 02/12/21 2280    Laboratory Results:  Results from last 7 days   Lab Units 02/12/21  0433 02/11/21  0533 02/10/21  1930 02/10/21  0347 02/09/21  0434 02/08/21  0449 02/07/21  0332 02/06/21  0414   WBC Thousand/uL  --  9 24  --  9 85 10 37* 12 93* 14 19* 15 08*   HEMOGLOBIN g/dL  --  9 0*  --  9 6* 9 8* 10 1* 9 9* 9 7*   HEMATOCRIT %  --  32 8*  --  33 5* 34 2* 35 6* 34 4* 32 8*   PLATELETS Thousands/uL  --  187  --  201 194 234 213 181   POTASSIUM mmol/L 3 3* 3 4* 3 5 3 1* 3 8 4 2 3 8 3 5   CHLORIDE mmol/L 107 106 107 105 107 106 102 105   CO2 mmol/L 32 32 33* 34* 32 33* 32 32   BUN mg/dL 73* 70* 71* 72* 64* 60* 59* 52*   CREATININE mg/dL 1 44* 1 57* 1 63* 1 62* 1 49* 1 53* 1 48* 1 50*   CALCIUM mg/dL 8 6 8 5 8 5 8 3 8 6 8 1* 8 1* 7 9*   MAGNESIUM mg/dL 3 2* 3 0* 2 6 2 5 2 6 2 4 2 5 1 9   PHOSPHORUS mg/dL  --   --   --  4 5*  --  4 6* 4 1 4 5*

## 2021-02-12 NOTE — ASSESSMENT & PLAN NOTE
· Confirmed positive 1/17  · Continue Decadron changed to 4mg/daily on 2/9, continue to wean, considering decreasing to 3 mg daily   · VC/Zinc completed   Continue MV  · Continue statin per protocol  · Continue pepcid   · Actemra given 1/28  · Conv Plasma 1/17  · Cont Heparin drip per protocol for elevated D dimer  · Not candidate for remdesivir  · Cont Ertapenum for superimposed ESBL E choli bacterial pneumonia

## 2021-02-13 LAB
ANION GAP SERPL CALCULATED.3IONS-SCNC: 8 MMOL/L (ref 4–13)
ANISOCYTOSIS BLD QL SMEAR: PRESENT
APTT PPP: 58 SECONDS (ref 23–37)
APTT PPP: 67 SECONDS (ref 23–37)
APTT PPP: 83 SECONDS (ref 23–37)
BASOPHILS # BLD MANUAL: 0 THOUSAND/UL (ref 0–0.1)
BASOPHILS NFR MAR MANUAL: 0 % (ref 0–1)
BUN SERPL-MCNC: 64 MG/DL (ref 5–25)
CALCIUM SERPL-MCNC: 8.5 MG/DL (ref 8.3–10.1)
CHLORIDE SERPL-SCNC: 109 MMOL/L (ref 100–108)
CO2 SERPL-SCNC: 32 MMOL/L (ref 21–32)
CREAT SERPL-MCNC: 1.39 MG/DL (ref 0.6–1.3)
EOSINOPHIL # BLD MANUAL: 1.15 THOUSAND/UL (ref 0–0.4)
EOSINOPHIL NFR BLD MANUAL: 14 % (ref 0–6)
ERYTHROCYTE [DISTWIDTH] IN BLOOD BY AUTOMATED COUNT: 24.7 % (ref 11.6–15.1)
GFR SERPL CREATININE-BSD FRML MDRD: 54 ML/MIN/1.73SQ M
GLUCOSE SERPL-MCNC: 100 MG/DL (ref 65–140)
GLUCOSE SERPL-MCNC: 113 MG/DL (ref 65–140)
GLUCOSE SERPL-MCNC: 124 MG/DL (ref 65–140)
GLUCOSE SERPL-MCNC: 136 MG/DL (ref 65–140)
GLUCOSE SERPL-MCNC: 146 MG/DL (ref 65–140)
GLUCOSE SERPL-MCNC: 97 MG/DL (ref 65–140)
HCT VFR BLD AUTO: 33.8 % (ref 36.5–49.3)
HGB BLD-MCNC: 9.3 G/DL (ref 12–17)
LYMPHOCYTES # BLD AUTO: 0.33 THOUSAND/UL (ref 0.6–4.47)
LYMPHOCYTES # BLD AUTO: 4 % (ref 14–44)
MAGNESIUM SERPL-MCNC: 3 MG/DL (ref 1.6–2.6)
MCH RBC QN AUTO: 23.7 PG (ref 26.8–34.3)
MCHC RBC AUTO-ENTMCNC: 27.5 G/DL (ref 31.4–37.4)
MCV RBC AUTO: 86 FL (ref 82–98)
METAMYELOCYTES NFR BLD MANUAL: 3 % (ref 0–1)
MONOCYTES # BLD AUTO: 0.58 THOUSAND/UL (ref 0–1.22)
MONOCYTES NFR BLD: 7 % (ref 4–12)
MYELOCYTES NFR BLD MANUAL: 1 % (ref 0–1)
NEUTROPHILS # BLD MANUAL: 5.68 THOUSAND/UL (ref 1.85–7.62)
NEUTS BAND NFR BLD MANUAL: 2 % (ref 0–8)
NEUTS SEG NFR BLD AUTO: 67 % (ref 43–75)
NRBC BLD AUTO-RTO: 0 /100 WBCS
OVALOCYTES BLD QL SMEAR: PRESENT
PLATELET # BLD AUTO: 193 THOUSANDS/UL (ref 149–390)
PLATELET BLD QL SMEAR: ADEQUATE
PMV BLD AUTO: 12.2 FL (ref 8.9–12.7)
POIKILOCYTOSIS BLD QL SMEAR: PRESENT
POLYCHROMASIA BLD QL SMEAR: PRESENT
POTASSIUM SERPL-SCNC: 3.3 MMOL/L (ref 3.5–5.3)
RBC # BLD AUTO: 3.92 MILLION/UL (ref 3.88–5.62)
SODIUM SERPL-SCNC: 149 MMOL/L (ref 136–145)
TOTAL CELLS COUNTED SPEC: 100
VARIANT LYMPHS # BLD AUTO: 2 %
WBC # BLD AUTO: 8.23 THOUSAND/UL (ref 4.31–10.16)

## 2021-02-13 PROCEDURE — 80048 BASIC METABOLIC PNL TOTAL CA: CPT | Performed by: PHYSICIAN ASSISTANT

## 2021-02-13 PROCEDURE — 85730 THROMBOPLASTIN TIME PARTIAL: CPT | Performed by: PHYSICIAN ASSISTANT

## 2021-02-13 PROCEDURE — 94760 N-INVAS EAR/PLS OXIMETRY 1: CPT

## 2021-02-13 PROCEDURE — 94150 VITAL CAPACITY TEST: CPT

## 2021-02-13 PROCEDURE — 99291 CRITICAL CARE FIRST HOUR: CPT | Performed by: INTERNAL MEDICINE

## 2021-02-13 PROCEDURE — 85730 THROMBOPLASTIN TIME PARTIAL: CPT | Performed by: INTERNAL MEDICINE

## 2021-02-13 PROCEDURE — 94003 VENT MGMT INPAT SUBQ DAY: CPT

## 2021-02-13 PROCEDURE — 83735 ASSAY OF MAGNESIUM: CPT | Performed by: PHYSICIAN ASSISTANT

## 2021-02-13 PROCEDURE — 85007 BL SMEAR W/DIFF WBC COUNT: CPT | Performed by: PHYSICIAN ASSISTANT

## 2021-02-13 PROCEDURE — 82948 REAGENT STRIP/BLOOD GLUCOSE: CPT

## 2021-02-13 PROCEDURE — 85027 COMPLETE CBC AUTOMATED: CPT | Performed by: PHYSICIAN ASSISTANT

## 2021-02-13 RX ORDER — POTASSIUM CHLORIDE 20MEQ/15ML
40 LIQUID (ML) ORAL ONCE
Status: COMPLETED | OUTPATIENT
Start: 2021-02-13 | End: 2021-02-13

## 2021-02-13 RX ORDER — POTASSIUM CHLORIDE 14.9 MG/ML
20 INJECTION INTRAVENOUS ONCE
Status: COMPLETED | OUTPATIENT
Start: 2021-02-13 | End: 2021-02-13

## 2021-02-13 RX ORDER — DEXAMETHASONE SODIUM PHOSPHATE 4 MG/ML
2 INJECTION, SOLUTION INTRA-ARTICULAR; INTRALESIONAL; INTRAMUSCULAR; INTRAVENOUS; SOFT TISSUE DAILY
Status: COMPLETED | OUTPATIENT
Start: 2021-02-14 | End: 2021-02-15

## 2021-02-13 RX ADMIN — POTASSIUM CHLORIDE 40 MEQ: 20 SOLUTION ORAL at 11:17

## 2021-02-13 RX ADMIN — MULTIVITAMIN 15 ML: LIQUID ORAL at 11:17

## 2021-02-13 RX ADMIN — DEXAMETHASONE SODIUM PHOSPHATE 4 MG: 4 INJECTION, SOLUTION INTRA-ARTICULAR; INTRALESIONAL; INTRAMUSCULAR; INTRAVENOUS; SOFT TISSUE at 09:32

## 2021-02-13 RX ADMIN — HEPARIN SODIUM 14 UNITS/KG/HR: 10000 INJECTION, SOLUTION INTRAVENOUS at 20:31

## 2021-02-13 RX ADMIN — SERTRALINE 75 MG: 25 TABLET, FILM COATED ORAL at 09:30

## 2021-02-13 RX ADMIN — Medication 20 MG: at 16:00

## 2021-02-13 RX ADMIN — Medication 6 MG: at 22:07

## 2021-02-13 RX ADMIN — FUROSEMIDE 40 MG: 10 INJECTION, SOLUTION INTRAMUSCULAR; INTRAVENOUS at 09:32

## 2021-02-13 RX ADMIN — POTASSIUM CHLORIDE 20 MEQ: 14.9 INJECTION, SOLUTION INTRAVENOUS at 17:31

## 2021-02-13 RX ADMIN — ERTAPENEM SODIUM 1000 MG: 1 INJECTION, POWDER, LYOPHILIZED, FOR SOLUTION INTRAMUSCULAR; INTRAVENOUS at 09:43

## 2021-02-13 RX ADMIN — POTASSIUM CHLORIDE 40 MEQ: 20 SOLUTION ORAL at 17:32

## 2021-02-13 RX ADMIN — MIDODRINE HYDROCHLORIDE 10 MG: 5 TABLET ORAL at 17:31

## 2021-02-13 RX ADMIN — SERTRALINE HYDROCHLORIDE 50 MG: 50 TABLET ORAL at 22:07

## 2021-02-13 RX ADMIN — Medication 20 MG: at 02:01

## 2021-02-13 RX ADMIN — Medication 12.5 MG: at 09:31

## 2021-02-13 RX ADMIN — Medication 2000 UNITS: at 09:29

## 2021-02-13 RX ADMIN — CHLORHEXIDINE GLUCONATE 0.12% ORAL RINSE 15 ML: 1.2 LIQUID ORAL at 20:30

## 2021-02-13 RX ADMIN — LEVETIRACETAM 750 MG: 100 SOLUTION ORAL at 20:13

## 2021-02-13 RX ADMIN — LEVETIRACETAM 750 MG: 100 SOLUTION ORAL at 09:43

## 2021-02-13 RX ADMIN — DESMOPRESSIN ACETATE 40 MG: 0.2 TABLET ORAL at 22:07

## 2021-02-13 RX ADMIN — MIDODRINE HYDROCHLORIDE 10 MG: 5 TABLET ORAL at 23:42

## 2021-02-13 RX ADMIN — HEPARIN SODIUM 5000 UNITS: 1000 INJECTION INTRAVENOUS; SUBCUTANEOUS at 06:25

## 2021-02-13 RX ADMIN — Medication 50 MCG/HR: at 11:18

## 2021-02-13 RX ADMIN — ASPIRIN 81 MG CHEWABLE TABLET 81 MG: 81 TABLET CHEWABLE at 09:30

## 2021-02-13 RX ADMIN — Medication 12.5 MG: at 20:31

## 2021-02-13 RX ADMIN — DOCUSATE SODIUM AND SENNOSIDES 1 TABLET: 8.6; 5 TABLET, FILM COATED ORAL at 09:30

## 2021-02-13 RX ADMIN — MIDODRINE HYDROCHLORIDE 10 MG: 5 TABLET ORAL at 06:19

## 2021-02-13 RX ADMIN — CHLORHEXIDINE GLUCONATE 0.12% ORAL RINSE 15 ML: 1.2 LIQUID ORAL at 09:33

## 2021-02-13 NOTE — ASSESSMENT & PLAN NOTE
Lab Results   Component Value Date    EGFR 52 02/12/2021    EGFR 47 02/11/2021    EGFR 45 02/10/2021    CREATININE 1 44 (H) 02/12/2021    CREATININE 1 57 (H) 02/11/2021    CREATININE 1 63 (H) 02/10/2021     · Creatine improved; appears to be at baseline   · Continue daily lasix with PRN dosing to maintain euvolemic/negative 500cc  · Renal following  · Cont Strict I/O  · Avoid nephrotoxic meds

## 2021-02-13 NOTE — PROGRESS NOTES
Progress Note - Seda Camejo 1960, 63 y o  male MRN: [id number]  Unit/Bed#: ICU 08 Encounter: [id number]  Primary Care Provider: Oswald Mandujano DO   Date and time admitted to hospital: 1/17/2021  4:16 PM      * COVID-19  Assessment & Plan  · Confirmed positive 1/17  · Continue Decadron changed to 4mg/daily on 2/9  · VC/Zinc completed   Continue MV  · Continue statin per protocol  · Continue pepcid   · Actemra given 1/28  · Conv Plasma 1/17  · Cont Heparin drip per protocol for elevated D dimer  · Not candidate for remdesivir  · Cont Ertapenum for superimposed ESBL E choli bacterial pneumonia    Pneumonia due to Escherichia coli [facility])  Assessment & Plan  · ESBL cont on Ertapenum d#7/7, total abx d #13  · Wean vent as tolerated    Acute respiratory failure with hypoxia and hypercarbia (HCC)  Assessment & Plan  · Secondary to COVID-19 and superimposed ESBL e coli pneumonia   · Previously he had been intubated on 1/17 and extubated on 1/18, reintubated 2/1  · Vent day #12- Wean vent setting as tolerated currently on AC/PC 16/18/50%/8  · Titrate FiO2 for SpO2 > 90%  · Covid therapy per severe pathway protocol   · ABG as needed  · Goals of care discussion with significant other ongoing   · Started conversations with regards to consideration of trach/peg       Acute kidney injury superimposed on CKD [facility])  Assessment & Plan  Lab Results   Component Value Date    EGFR 52 02/12/2021    EGFR 47 02/11/2021    EGFR 45 02/10/2021    CREATININE 1 44 (H) 02/12/2021    CREATININE 1 57 (H) 02/11/2021    CREATININE 1 63 (H) 02/10/2021     · Creatine improved; appears to be at baseline   · Continue daily lasix with PRN dosing to maintain euvolemic/negative 500cc  · Renal following  · Cont Strict I/O  · Avoid nephrotoxic meds    Acute metabolic encephalopathy  Assessment & Plan  · Secondary to hypoxia/hypercapnia and history of CVA   · Neurology consulted no additional intervention  · EEG- no seizure activity   · CAM-ICU · Continue Zoloft  · Continue Fentanyl for sedation, patient is awake and following commands  · Able to nod and gesture appropriately     Seizure [facility])  Assessment & Plan  · Patient does not have a seizure history but does have history of CVA in the past  · Neurology consulted and appreciate input, have signed off at this time  · Intubated 2/1 for suspected seizure activity   · Continue on Keppra 750mg BID    · EEG - no seizures     Dysphagia  Assessment & Plan  · SLP following and had recommend pureed diet prior to intubation  · Reassess after extubation    Anemia  Assessment & Plan  · No active bleeding  · Trend CBC daily   · hgb slowly drifting down, 9 0 on 2/11    · Transfuse for Hgb < 7 0     History of stroke  Assessment & Plan  · On Heparin drip  · No clinical evidence of new stroke    Atrial fibrillation ([address] )  Assessment & Plan  · Has been in SR with multiple PVCs  · Patient had an episode of sustained wide QRS tachycardia 2/11 - received IV Mg & IV BB  · Not on AC at home secondary to history of GI bleed  · Cont heparin gtt secondary to elevated D dimer  · Holding cardizem secondary to hypotension  · Restarted lopressor IV 2 5 mg q 6 hrs to help with rhythm control, if BP remains stable would start PO dosing   · [id number]    Depression  Assessment & Plan  · Continue home Zoloft BID     Morbid obesity   Assessment & Plan  · Likely contributing to ANGELINA  · Nutrition consult when appropriate    Hyperlipidemia  Assessment & Plan  · Continue atorvastatin     Essential hypertension  Assessment & Plan  · Restarting BB   · Continue lasix   · BP soft but stable - continue midodrine 10 mg       ----------------------------------------------------------------------------------------  HPI/24hr events: Mr Cortes Ivy was examined bedside this AM, with no acute events overnight      Disposition: Continue Critical Care   Code Status: Level 1 - Full Code  ---------------------------------------------------------------------------------------  SUBJECTIVE: Unable to obtain from patient    Review of Systems   Unable to perform ROS: Intubated     Review of systems was unable to be performed secondary to intubation  ---------------------------------------------------------------------------------------  OBJECTIVE    Vitals   Vitals:    21 2326 21 0000 21 0100 21 0200   BP:  105/64 102/65 107/68   Pulse:  84 96 85   Resp:  18 19 19   Temp:  97 7 °F (36 5 °C) (!) 97 3 °F (36 3 °C) (!) 97 3 °F (36 3 °C)   TempSrc:       SpO2: 92% 90% (!) 89% (!) 89%   Weight:       Height:         Temp (24hrs), Av 5 °F (36 4 °C), Min:97 °F (36 1 °C), Max:98 2 °F (36 8 °C)  Current: Temperature: (!) 97 3 °F (36 3 °C)  Arterial Line BP: 107/50  Arterial Line MAP (mmHg): 70 mmHg    Respiratory:  SpO2: SpO2: (!) 89 %    Nasal Cannula O2 Flow Rate (L/min): 50 L/min    Invasive/non-invasive ventilation settings   Respiratory    Lab Data (Last 4 hours)    None         O2/Vent Data (Last 4 hours)    None              Physical Exam  Vitals signs and nursing note reviewed  Constitutional:       Interventions: He is sedated, intubated and restrained  HENT:      Head: Normocephalic and atraumatic  Mouth/Throat:      Mouth: Mucous membranes are dry  Eyes:      Conjunctiva/sclera: Conjunctivae normal       Pupils: Pupils are equal, round, and reactive to light  Neck:      Musculoskeletal: Neck supple  Cardiovascular:      Rate and Rhythm: Normal rate and regular rhythm  Pulmonary:      Effort: He is intubated  Breath sounds: Decreased breath sounds present  Abdominal:      Palpations: Abdomen is soft  Tenderness: There is no abdominal tenderness  Musculoskeletal:         General: Swelling present  Right lower le+ Pitting Edema present  Left lower le+ Pitting Edema present  Skin:     General: Skin is warm and dry  Neurological:      GCS: GCS eye subscore is 4  GCS verbal subscore is 1  GCS motor subscore is 6         Laboratory and Diagnostics:  Results from last 7 days   Lab Units 02/11/21  0533 02/10/21  0347 02/09/21  0434 02/08/21  0449 02/07/21  0332 02/06/21  0414   WBC Thousand/uL 9 24 9 85 10 37* 12 93* 14 19* 15 08*   HEMOGLOBIN g/dL 9 0* 9 6* 9 8* 10 1* 9 9* 9 7*   HEMATOCRIT % 32 8* 33 5* 34 2* 35 6* 34 4* 32 8*   PLATELETS Thousands/uL 187 201 194 234 213 181   NEUTROS PCT %  --   --  68  --   --  92*   BANDS PCT % 3 4  --  2 6  --    MONOS PCT %  --   --  10  --   --  3*   MONO PCT % 5 10  --  7 3*  --      Results from last 7 days   Lab Units 02/12/21  0433 02/11/21  0533 02/10/21  1930 02/10/21  0347 02/09/21  0434 02/08/21  0449 02/07/21  0332   SODIUM mmol/L 146* 145 146* 144 145 144 142   POTASSIUM mmol/L 3 3* 3 4* 3 5 3 1* 3 8 4 2 3 8   CHLORIDE mmol/L 107 106 107 105 107 106 102   CO2 mmol/L 32 32 33* 34* 32 33* 32   ANION GAP mmol/L 7 7 6 5 6 5 8   BUN mg/dL 73* 70* 71* 72* 64* 60* 59*   CREATININE mg/dL 1 44* 1 57* 1 63* 1 62* 1 49* 1 53* 1 48*   CALCIUM mg/dL 8 6 8 5 8 5 8 3 8 6 8 1* 8 1*   GLUCOSE RANDOM mg/dL 134 126 116 132 122 123 181*     Results from last 7 days   Lab Units 02/12/21  0433 02/11/21  0533 02/10/21  1930 02/10/21  0347 02/09/21  0434 02/08/21  0449 02/07/21  0332 02/06/21  0414   MAGNESIUM mg/dL 3 2* 3 0* 2 6 2 5 2 6 2 4 2 5 1 9   PHOSPHORUS mg/dL  --   --   --  4 5*  --  4 6* 4 1 4 5*      Results from last 7 days   Lab Units 02/12/21  0433 02/11/21  0533 02/10/21  0512 02/09/21  1749 02/09/21  1129 02/09/21  0434 02/08/21  2228   PTT seconds 74* 84* 72* 72* 67* 59* 68*          Results from last 7 days   Lab Units 02/07/21  0332   LACTIC ACID mmol/L 0 8     ABG:  Results from last 7 days   Lab Units 02/09/21  0733   PH ART  7 389   PCO2 ART mm Hg 54 0*   PO2 ART mm Hg 59 9*   HCO3 ART mmol/L 31 9*   BASE EXC ART mmol/L 5 8   ABG SOURCE  Line, Arterial     VBG:  Results from last 7 days   Lab Units 02/09/21  0733   ABG SOURCE  Line, Arterial     Results from last 7 days   Lab Units 02/07/21  0331 02/06/21  0414   PROCALCITONIN ng/ml 0 45* 0 96*     EKG: Telemetry reviewed  Imaging: I have personally reviewed pertinent reports  and I have personally reviewed pertinent films in PACS    Intake and Output  I/O       02/11 0701 - 02/12 0700 02/12 0701 - 02/13 0700    P  O  0     I V  (mL/kg) 524 (3 2) 326 (2)    NG/ 910    IV Piggyback 50 150    Feedings 960 1496    Total Intake(mL/kg) 2284 (13 8) 2882 (17 4)    Urine (mL/kg/hr) 1750 (0 4) 1915 (0 5)    Emesis/NG output 0     Stool 0 0    Total Output 1750 1915    Net +534 +967          Unmeasured Stool Occurrence 3 x 3 x        Height and Weights   Height: 5' 11" (180 3 cm)  IBW: 75 3 kg  Body mass index is 50 95 kg/m²  Weight (last 2 days)     Date/Time   Weight    02/11/21 0537   (!) 166 (365 3)            Nutrition       Diet Orders   (From admission, onward)             Start     Ordered    02/12/21 1051  Diet Enteral/Parenteral; Tube Feeding No Oral Diet; Jevity 1 5; Continuous; 45; Prosource Protein Liquid - Two Packets; 200; Every 4 hours  Diet effective now     Question Answer Comment   Diet Type Enteral/Parenteral    Enteral/Parenteral Tube Feeding No Oral Diet    Tube Feeding Formula: Jevity 1 5    Bolus/Cyclic/Continuous Continuous    Tube Feeding Goal Rate (mL/hr): 45    Prosource Protein Liquid - No Carb Prosource Protein Liquid - Two Packets    Tube Feeding flush (mL): 200    Water flush frequency: Every 4 hours    RD to adjust diet per protocol?  Yes        02/12/21 1050    01/17/21 1719  Room Service  Once     Question:  Type of Service  Answer:  Room Service- Not Appropriate    01/17/21 1718              Active Medications  Scheduled Meds:  Current Facility-Administered Medications   Medication Dose Route Frequency Provider Last Rate    acetaminophen  650 mg Per NG Tube Q4H PRN Sincere Harris PA-C      aspirin  81 mg Oral Daily [facility], CRNP      atorvastatin  40 mg Per NG Tube HS Kanu Manner, HERBERT      bisacodyl  10 mg Rectal Daily PRN Kanudixon Eugene, HERBERT      chlorhexidine  15 mL [facility] Manner, [place]      cholecalciferol  2,000 Units Per NG Tube Daily Kanu Manner, HERBERT      dexamethasone  4 mg Intravenous Daily [facility], CRNP      ertapenem  1,000 mg Intravenous Q24H Sarah Echeverria PA-C Stopped (02/12/21 3845)    fentaNYL  50 mcg/hr Intravenous Continuous [facility], CRNP 50 mcg/hr (02/12/21 1601)    fentanyl citrate (PF)  50 mcg Intravenous Q1H PRN [facility], CRNP      furosemide  40 mg Intravenous Daily Shell Willson PA-C      heparin (porcine)  3-30 Units/kg/hr (Order-Specific) Intravenous Titrated Lucian Keo, DO 12 Units/kg/hr (02/12/21 1302)    heparin (porcine)  10,000 Units Intravenous Q1H PRN Lucian Keo, DO      heparin (porcine)  5,000 Units Intravenous Q1H PRN Lucian Keo, DO      insulin lispro  1-6 Units Subcutaneous Q6H [address] [facility], CRNP      levETIRAcetam  750 mg Oral Q12H [address] [facility], CRNP      melatonin  6 mg Oral HS [facility], CRNP      metoprolol tartrate  12 5 mg Oral Q12H [address] Chirag Spence, KATHY      midodrine  10 mg Oral Q8H Tiara Shepherd, KATHY      multivitamin with iron-minerals  15 mL Per NG Tube Daily Kanu Eugene, HERBERT      omeprazole (PRILOSEC) suspension 2 mg/mL  20 mg Oral Q12H [facility], CRJOSE DANIEL      polyethylene glycol  17 g Oral BID Kanu Manner, HERBERT      senna-docusate sodium  1 tablet Per NG Tube BID [facility], CRNP      sertraline  50 mg Oral HS [facility], CRNP      sertraline  75 mg Oral Daily KATHY Domingo       Continuous Infusions:  fentaNYL, 50 mcg/hr, Last Rate: 50 mcg/hr (02/12/21 1601)  heparin (porcine), 3-30 Units/kg/hr (Order-Specific), Last Rate: 12 Units/kg/hr (02/12/21 1302)      PRN Meds:   acetaminophen, 650 mg, Q4H PRN  bisacodyl, 10 mg, Daily PRN  fentanyl citrate (PF), 50 mcg, Q1H PRN  heparin (porcine), 10,000 Units, Q1H PRN  heparin (porcine), 5,000 Units, Q1H PRN      Invasive Devices Review  Invasive Devices     Peripheral Intravenous Line            Long-Dwell Peripheral IV (Midline) 24/65/78 Left Cephalic 18 days          Drain            Urethral Catheter Straight-tip 16 Fr  26 days    NG/OG/Enteral Tube Orogastric 11 days    Rectal Tube With balloon less than 1 day          Airway            ETT  Cuffed; Hi-Lo 8 mm 11 days              ---------------------------------------------------------------------------------------  Care Time Delivered:   No Critical Care time spent     Gisselle Esquivel PA-C    Portions of the record may have been created with voice recognition software  Occasional wrong word or "sound a like" substitutions may have occurred due to the inherent limitations of voice recognition software    Read the chart carefully and recognize, using context, where substitutions have occurred

## 2021-02-13 NOTE — RESPIRATORY THERAPY NOTE
Pt weaned on SPONT 5/8 50%  For 10 hours pts started desaturating lower thsn 88% placed back on S-CMV to rest over night

## 2021-02-13 NOTE — ASSESSMENT & PLAN NOTE
· Secondary to COVID-19 and superimposed ESBL e coli pneumonia   · Previously he had been intubated on 1/17 and extubated on 1/18, reintubated 2/1  · Vent day #12- Wean vent setting as tolerated currently on AC/PC 16/18/50%/8  · Titrate FiO2 for SpO2 > 90%  · Covid therapy per severe pathway protocol   · ABG as needed  · Goals of care discussion with significant other ongoing   · Started conversations with regards to consideration of trach/peg

## 2021-02-13 NOTE — ASSESSMENT & PLAN NOTE
· Has been in SR with multiple PVCs  · Patient had an episode of sustained wide QRS tachycardia 2/11 - received IV Mg & IV BB  · Not on AC at home secondary to history of GI bleed  · Cont heparin gtt secondary to elevated D dimer  · Holding cardizem secondary to hypotension  · Restarted lopressor IV 2 5 mg q 6 hrs to help with rhythm control, if BP remains stable would start PO dosing   · [id number] 3

## 2021-02-13 NOTE — QUICK NOTE
Provided an update to patient's wife, Fara Ye, over the phone regaring patient's current status and treatments  She knows that he has failed to progress on the ventilator, and she was to keep thinking about whether or not she she would want him to have a tracheostomy  All questions answered  She expressed understanding and appreciation

## 2021-02-14 ENCOUNTER — APPOINTMENT (INPATIENT)
Dept: RADIOLOGY | Facility: HOSPITAL | Age: 61
DRG: 130 | End: 2021-02-14
Payer: COMMERCIAL

## 2021-02-14 LAB
ANION GAP SERPL CALCULATED.3IONS-SCNC: 6 MMOL/L (ref 4–13)
ANION GAP SERPL CALCULATED.3IONS-SCNC: 6 MMOL/L (ref 4–13)
ANISOCYTOSIS BLD QL SMEAR: PRESENT
APTT PPP: 72 SECONDS (ref 23–37)
BASO STIPL BLD QL SMEAR: PRESENT
BASOPHILS # BLD MANUAL: 0 THOUSAND/UL (ref 0–0.1)
BASOPHILS NFR MAR MANUAL: 0 % (ref 0–1)
BUN SERPL-MCNC: 58 MG/DL (ref 5–25)
BUN SERPL-MCNC: 60 MG/DL (ref 5–25)
CALCIUM SERPL-MCNC: 8.5 MG/DL (ref 8.3–10.1)
CALCIUM SERPL-MCNC: 8.7 MG/DL (ref 8.3–10.1)
CHLORIDE SERPL-SCNC: 107 MMOL/L (ref 100–108)
CHLORIDE SERPL-SCNC: 108 MMOL/L (ref 100–108)
CO2 SERPL-SCNC: 32 MMOL/L (ref 21–32)
CO2 SERPL-SCNC: 33 MMOL/L (ref 21–32)
CREAT SERPL-MCNC: 1.4 MG/DL (ref 0.6–1.3)
CREAT SERPL-MCNC: 1.43 MG/DL (ref 0.6–1.3)
EOSINOPHIL # BLD MANUAL: 1.25 THOUSAND/UL (ref 0–0.4)
EOSINOPHIL NFR BLD MANUAL: 13 % (ref 0–6)
ERYTHROCYTE [DISTWIDTH] IN BLOOD BY AUTOMATED COUNT: 25.2 % (ref 11.6–15.1)
GFR SERPL CREATININE-BSD FRML MDRD: 52 ML/MIN/1.73SQ M
GFR SERPL CREATININE-BSD FRML MDRD: 54 ML/MIN/1.73SQ M
GLUCOSE SERPL-MCNC: 105 MG/DL (ref 65–140)
GLUCOSE SERPL-MCNC: 106 MG/DL (ref 65–140)
GLUCOSE SERPL-MCNC: 128 MG/DL (ref 65–140)
GLUCOSE SERPL-MCNC: 134 MG/DL (ref 65–140)
GLUCOSE SERPL-MCNC: 96 MG/DL (ref 65–140)
HCT VFR BLD AUTO: 31.8 % (ref 36.5–49.3)
HGB BLD-MCNC: 8.9 G/DL (ref 12–17)
HYPERCHROMIA BLD QL SMEAR: PRESENT
LYMPHOCYTES # BLD AUTO: 0.67 THOUSAND/UL (ref 0.6–4.47)
LYMPHOCYTES # BLD AUTO: 7 % (ref 14–44)
MAGNESIUM SERPL-MCNC: 2.7 MG/DL (ref 1.6–2.6)
MCH RBC QN AUTO: 23.8 PG (ref 26.8–34.3)
MCHC RBC AUTO-ENTMCNC: 28 G/DL (ref 31.4–37.4)
MCV RBC AUTO: 85 FL (ref 82–98)
MONOCYTES # BLD AUTO: 0.67 THOUSAND/UL (ref 0–1.22)
MONOCYTES NFR BLD: 7 % (ref 4–12)
NEUTROPHILS # BLD MANUAL: 6.99 THOUSAND/UL (ref 1.85–7.62)
NEUTS BAND NFR BLD MANUAL: 1 % (ref 0–8)
NEUTS SEG NFR BLD AUTO: 72 % (ref 43–75)
NRBC BLD AUTO-RTO: 0 /100 WBCS
PLATELET # BLD AUTO: 171 THOUSANDS/UL (ref 149–390)
PLATELET BLD QL SMEAR: ADEQUATE
PMV BLD AUTO: 11.9 FL (ref 8.9–12.7)
POLYCHROMASIA BLD QL SMEAR: PRESENT
POTASSIUM SERPL-SCNC: 3 MMOL/L (ref 3.5–5.3)
POTASSIUM SERPL-SCNC: 3.2 MMOL/L (ref 3.5–5.3)
RBC # BLD AUTO: 3.74 MILLION/UL (ref 3.88–5.62)
RBC MORPH BLD: PRESENT
SODIUM SERPL-SCNC: 146 MMOL/L (ref 136–145)
SODIUM SERPL-SCNC: 146 MMOL/L (ref 136–145)
TOTAL CELLS COUNTED SPEC: 100
WBC # BLD AUTO: 9.58 THOUSAND/UL (ref 4.31–10.16)

## 2021-02-14 PROCEDURE — 94150 VITAL CAPACITY TEST: CPT

## 2021-02-14 PROCEDURE — 85007 BL SMEAR W/DIFF WBC COUNT: CPT | Performed by: PHYSICIAN ASSISTANT

## 2021-02-14 PROCEDURE — 99291 CRITICAL CARE FIRST HOUR: CPT | Performed by: INTERNAL MEDICINE

## 2021-02-14 PROCEDURE — 80048 BASIC METABOLIC PNL TOTAL CA: CPT | Performed by: NURSE PRACTITIONER

## 2021-02-14 PROCEDURE — 74018 RADEX ABDOMEN 1 VIEW: CPT

## 2021-02-14 PROCEDURE — 71045 X-RAY EXAM CHEST 1 VIEW: CPT

## 2021-02-14 PROCEDURE — 82948 REAGENT STRIP/BLOOD GLUCOSE: CPT

## 2021-02-14 PROCEDURE — 94760 N-INVAS EAR/PLS OXIMETRY 1: CPT

## 2021-02-14 PROCEDURE — 85027 COMPLETE CBC AUTOMATED: CPT | Performed by: PHYSICIAN ASSISTANT

## 2021-02-14 PROCEDURE — 94003 VENT MGMT INPAT SUBQ DAY: CPT

## 2021-02-14 PROCEDURE — 85730 THROMBOPLASTIN TIME PARTIAL: CPT | Performed by: INTERNAL MEDICINE

## 2021-02-14 PROCEDURE — 80048 BASIC METABOLIC PNL TOTAL CA: CPT | Performed by: PHYSICIAN ASSISTANT

## 2021-02-14 PROCEDURE — 83735 ASSAY OF MAGNESIUM: CPT | Performed by: PHYSICIAN ASSISTANT

## 2021-02-14 RX ORDER — POTASSIUM CHLORIDE 29.8 MG/ML
40 INJECTION INTRAVENOUS ONCE
Status: COMPLETED | OUTPATIENT
Start: 2021-02-14 | End: 2021-02-14

## 2021-02-14 RX ORDER — POTASSIUM CHLORIDE 14.9 MG/ML
20 INJECTION INTRAVENOUS 4 TIMES DAILY
Status: COMPLETED | OUTPATIENT
Start: 2021-02-14 | End: 2021-02-16

## 2021-02-14 RX ORDER — FUROSEMIDE 10 MG/ML
40 INJECTION INTRAMUSCULAR; INTRAVENOUS ONCE
Status: COMPLETED | OUTPATIENT
Start: 2021-02-14 | End: 2021-02-14

## 2021-02-14 RX ORDER — POTASSIUM CHLORIDE 20MEQ/15ML
40 LIQUID (ML) ORAL ONCE
Status: COMPLETED | OUTPATIENT
Start: 2021-02-14 | End: 2021-02-14

## 2021-02-14 RX ADMIN — Medication 12.5 MG: at 20:45

## 2021-02-14 RX ADMIN — HEPARIN SODIUM 14 UNITS/KG/HR: 10000 INJECTION, SOLUTION INTRAVENOUS at 12:23

## 2021-02-14 RX ADMIN — Medication 6 MG: at 22:27

## 2021-02-14 RX ADMIN — POTASSIUM CHLORIDE 40 MEQ: 20 SOLUTION ORAL at 06:39

## 2021-02-14 RX ADMIN — Medication 2000 UNITS: at 09:16

## 2021-02-14 RX ADMIN — SERTRALINE 75 MG: 25 TABLET, FILM COATED ORAL at 09:18

## 2021-02-14 RX ADMIN — CHLORHEXIDINE GLUCONATE 0.12% ORAL RINSE 15 ML: 1.2 LIQUID ORAL at 09:28

## 2021-02-14 RX ADMIN — Medication 1 TABLET: at 12:21

## 2021-02-14 RX ADMIN — MIDODRINE HYDROCHLORIDE 10 MG: 5 TABLET ORAL at 22:27

## 2021-02-14 RX ADMIN — Medication 25 MCG/HR: at 11:36

## 2021-02-14 RX ADMIN — Medication 20 MG: at 04:57

## 2021-02-14 RX ADMIN — MIDODRINE HYDROCHLORIDE 10 MG: 5 TABLET ORAL at 16:27

## 2021-02-14 RX ADMIN — LEVETIRACETAM 750 MG: 100 SOLUTION ORAL at 20:45

## 2021-02-14 RX ADMIN — POTASSIUM CHLORIDE 20 MEQ: 14.9 INJECTION, SOLUTION INTRAVENOUS at 20:45

## 2021-02-14 RX ADMIN — POTASSIUM CHLORIDE 40 MEQ: 29.8 INJECTION, SOLUTION INTRAVENOUS at 06:39

## 2021-02-14 RX ADMIN — CHLORHEXIDINE GLUCONATE 0.12% ORAL RINSE 15 ML: 1.2 LIQUID ORAL at 20:45

## 2021-02-14 RX ADMIN — DEXAMETHASONE SODIUM PHOSPHATE 2 MG: 4 INJECTION, SOLUTION INTRA-ARTICULAR; INTRALESIONAL; INTRAMUSCULAR; INTRAVENOUS; SOFT TISSUE at 09:11

## 2021-02-14 RX ADMIN — DESMOPRESSIN ACETATE 40 MG: 0.2 TABLET ORAL at 22:27

## 2021-02-14 RX ADMIN — SERTRALINE HYDROCHLORIDE 50 MG: 50 TABLET ORAL at 22:27

## 2021-02-14 RX ADMIN — MIDODRINE HYDROCHLORIDE 10 MG: 5 TABLET ORAL at 06:39

## 2021-02-14 RX ADMIN — FUROSEMIDE 40 MG: 10 INJECTION, SOLUTION INTRAVENOUS at 15:17

## 2021-02-14 RX ADMIN — DOCUSATE SODIUM AND SENNOSIDES 1 TABLET: 8.6; 5 TABLET, FILM COATED ORAL at 09:15

## 2021-02-14 RX ADMIN — ERTAPENEM SODIUM 1000 MG: 1 INJECTION, POWDER, LYOPHILIZED, FOR SOLUTION INTRAMUSCULAR; INTRAVENOUS at 11:37

## 2021-02-14 RX ADMIN — Medication 12.5 MG: at 09:17

## 2021-02-14 RX ADMIN — ASPIRIN 81 MG CHEWABLE TABLET 81 MG: 81 TABLET CHEWABLE at 09:14

## 2021-02-14 RX ADMIN — POTASSIUM CHLORIDE 20 MEQ: 14.9 INJECTION, SOLUTION INTRAVENOUS at 22:27

## 2021-02-14 RX ADMIN — LEVETIRACETAM 750 MG: 100 SOLUTION ORAL at 09:23

## 2021-02-14 RX ADMIN — FUROSEMIDE 40 MG: 10 INJECTION, SOLUTION INTRAMUSCULAR; INTRAVENOUS at 09:05

## 2021-02-14 RX ADMIN — Medication 20 MG: at 16:29

## 2021-02-14 NOTE — ASSESSMENT & PLAN NOTE
· Tele: has had wide complex rhythm since extubation with PVCs  · Not on AC at home secondary to history of GI bleed  · Continue eliquis  · Holding cardizem secondary to hypotension  · Continue metoprolol 25mg BID

## 2021-02-14 NOTE — PLAN OF CARE
Problem: Prexisting or High Potential for Compromised Skin Integrity  Goal: Skin integrity is maintained or improved  Description: INTERVENTIONS:  - Identify patients at risk for skin breakdown  - Assess and monitor skin integrity  - Assess and monitor nutrition and hydration status  - Monitor labs   - Assess for incontinence   - Turn and reposition patient  - Assist with mobility/ambulation  - Relieve pressure over bony prominences  - Avoid friction and shearing  - Provide appropriate hygiene as needed including keeping skin clean and dry  - Evaluate need for skin moisturizer/barrier cream  - Collaborate with interdisciplinary team   - Patient/family teaching  - Consider wound care consult   Outcome: Progressing     Problem: Potential for Falls  Goal: Patient will remain free of falls  Description: INTERVENTIONS:  - Assess patient frequently for physical needs  -  Identify cognitive and physical deficits and behaviors that affect risk of falls    -  [place] fall precautions as indicated by assessment   - Educate patient/family on patient safety including physical limitations  - Instruct patient to call for assistance with activity based on assessment  - Modify environment to reduce risk of injury  - Consider OT/PT consult to assist with strengthening/mobility  Outcome: Progressing     Problem: SAFETY,RESTRAINT: NV/NON-SELF DESTRUCTIVE BEHAVIOR  Goal: Remains free of harm/injury (restraint for non violent/non self-detsructive behavior)  Description: INTERVENTIONS:  - Instruct patient/family regarding restraint use   - Assess and monitor physiologic and psychological status   - Provide interventions and comfort measures to meet assessed patient needs   - Identify and implement measures to help patient regain control  - Assess readiness for release of restraint   Outcome: Progressing  Goal: Returns to optimal restraint-free functioning  Description: INTERVENTIONS:  - Assess the patient's behavior and symptoms that indicate continued need for restraint  - Identify and implement measures to help patient regain control  - Assess readiness for release of restraint   Outcome: Progressing     Problem: Nutrition/Hydration-ADULT  Goal: Nutrient/Hydration intake appropriate for improving, restoring or maintaining nutritional needs  Description: Monitor and assess patient's nutrition/hydration status for malnutrition  Collaborate with interdisciplinary team and initiate plan and interventions as ordered  Monitor patient's weight and dietary intake as ordered or per policy  Utilize nutrition screening tool and intervene as necessary  Determine patient's food preferences and provide high-protein, high-caloric foods as appropriate       INTERVENTIONS:  - Monitor oral intake, urinary output, labs, and treatment plans  - Assess nutrition and hydration status and recommend course of action  - Evaluate amount of meals eaten  - Assist patient with eating if necessary   - Allow adequate time for meals  - Recommend/ encourage appropriate diets, oral nutritional supplements, and vitamin/mineral supplements  - Order, calculate, and assess calorie counts as needed  - Recommend, monitor, and adjust tube feedings and TPN/PPN based on assessed needs  - Assess need for intravenous fluids  - Provide specific nutrition/hydration education as appropriate  - Include patient/family/caregiver in decisions related to nutrition  Outcome: Progressing     Problem: NEUROSENSORY - ADULT  Goal: Achieves stable or improved neurological status  Description: INTERVENTIONS  - Monitor and report changes in neurological status  - Monitor vital signs such as temperature, blood pressure, glucose, and any other labs ordered   - Initiate measures to prevent increased intracranial pressure  - Monitor for seizure activity and implement precautions if appropriate      Outcome: Progressing  Goal: Achieves maximal functionality and self care  Description: INTERVENTIONS  - Monitor swallowing and airway patency with patient fatigue and changes in neurological status  - Encourage and assist patient to increase activity and self care     - Encourage visually impaired, hearing impaired and aphasic patients to use assistive/communication devices  Outcome: Progressing     Problem: CARDIOVASCULAR - ADULT  Goal: Maintains optimal cardiac output and hemodynamic stability  Description: INTERVENTIONS:  - Monitor I/O, vital signs and rhythm  - Monitor for S/S and trends of decreased cardiac output  - Administer and titrate ordered vasoactive medications to optimize hemodynamic stability  - Assess quality of pulses, skin color and temperature  - Assess for signs of decreased coronary artery perfusion  - Instruct patient to report change in severity of symptoms  Outcome: Progressing  Goal: Absence of cardiac dysrhythmias or at baseline rhythm  Description: INTERVENTIONS:  - Continuous cardiac monitoring, vital signs, obtain 12 lead EKG if ordered  - Administer antiarrhythmic and heart rate control medications as ordered  - Monitor electrolytes and administer replacement therapy as ordered  Outcome: Progressing     Problem: RESPIRATORY - ADULT  Goal: Achieves optimal ventilation and oxygenation  Description: INTERVENTIONS:  - Assess for changes in respiratory status  - Assess for changes in mentation and behavior  - Position to facilitate oxygenation and minimize respiratory effort  - Oxygen administered by appropriate delivery if ordered  - Initiate smoking cessation education as indicated  - Encourage broncho-pulmonary hygiene including cough, deep breathe, Incentive Spirometry  - Assess the need for suctioning and aspirate as needed  - Assess and instruct to report SOB or any respiratory difficulty  - Respiratory Therapy support as indicated  Outcome: Progressing     Problem: GENITOURINARY - ADULT  Goal: Maintains or returns to baseline urinary function  Description: INTERVENTIONS:  - Assess urinary function  - Encourage oral fluids to ensure adequate hydration if ordered  - Administer IV fluids as ordered to ensure adequate hydration  - Administer ordered medications as needed  - Offer frequent toileting  - Follow urinary retention protocol if ordered  Outcome: Progressing  Goal: Absence of urinary retention  Description: INTERVENTIONS:  - Assess patients ability to void and empty bladder  - Monitor I/O  - Bladder scan as needed  - Discuss with physician/AP medications to alleviate retention as needed  - Discuss catheterization for long term situations as appropriate  Outcome: Progressing  Goal: Urinary catheter remains patent  Description: INTERVENTIONS:  - Assess patency of urinary catheter  - If patient has a chronic bo, consider changing catheter if non-functioning  - Follow guidelines for intermittent irrigation of non-functioning urinary catheter  Outcome: Progressing     Problem: METABOLIC, FLUID AND ELECTROLYTES - ADULT  Goal: Electrolytes maintained within normal limits  Description: INTERVENTIONS:  - Monitor labs and assess patient for signs and symptoms of electrolyte imbalances  - Administer electrolyte replacement as ordered  - Monitor response to electrolyte replacements, including repeat lab results as appropriate  - Instruct patient on fluid and nutrition as appropriate  Outcome: Progressing  Goal: Fluid balance maintained  Description: INTERVENTIONS:  - Monitor labs   - Monitor I/O and WT  - Instruct patient on fluid and nutrition as appropriate  - Assess for signs & symptoms of volume excess or deficit  Outcome: Progressing  Goal: Glucose maintained within target range  Description: INTERVENTIONS:  - Monitor Blood Glucose as ordered  - Assess for signs and symptoms of hyperglycemia and hypoglycemia  - Administer ordered medications to maintain glucose within target range  - Assess nutritional intake and initiate nutrition service referral as needed  Outcome: Progressing     Problem: MUSCULOSKELETAL - ADULT  Goal: Maintain or return mobility to safest level of function  Description: INTERVENTIONS:  - Assess patient's ability to carry out ADLs; assess patient's baseline for ADL function and identify physical deficits which impact ability to perform ADLs (bathing, care of mouth/teeth, toileting, grooming, dressing, etc )  - Assess/evaluate cause of self-care deficits   - Assess range of motion  - Assess patient's mobility  - Assess patient's need for assistive devices and provide as appropriate  - Encourage maximum independence but intervene and supervise when necessary  - Involve family in performance of ADLs  - Assess for home care needs following discharge   - Consider OT consult to assist with ADL evaluation and planning for discharge  - Provide patient education as appropriate  Outcome: Progressing  Goal: Maintain proper alignment of affected body part  Description: INTERVENTIONS:  - Support, maintain and protect limb and body alignment  - Provide patient/ family with appropriate education  Outcome: Progressing     Problem: INFECTION - ADULT  Goal: Absence or prevention of progression during hospitalization  Description: INTERVENTIONS:  - Assess and monitor for signs and symptoms of infection  - Monitor lab/diagnostic results  - Monitor all insertion sites, i e  indwelling lines, tubes, and drains  - Monitor endotracheal if appropriate and nasal secretions for changes in amount and color  - [place] appropriate cooling/warming therapies per order  - Administer medications as ordered  - Instruct and encourage patient and family to use good hand hygiene technique  - Identify and instruct in appropriate isolation precautions for identified infection/condition  Outcome: Progressing     Problem: DISCHARGE PLANNING  Goal: Discharge to home or other facility with appropriate resources  Description: INTERVENTIONS:  - Identify barriers to discharge w/patient and caregiver  - Arrange for needed discharge resources and transportation as appropriate  - Identify discharge learning needs (meds, wound care, etc )  - Arrange for interpretive services to assist at discharge as needed  - Refer to Case Management Department for coordinating discharge planning if the patient needs post-hospital services based on physician/advanced practitioner order or complex needs related to functional status, cognitive ability, or social support system  Outcome: Progressing     Problem: Knowledge Deficit  Goal: Patient/family/caregiver demonstrates understanding of disease process, treatment plan, medications, and discharge instructions  Description: Complete learning assessment and assess knowledge base    Interventions:  - Provide teaching at level of understanding  - Provide teaching via preferred learning methods  Outcome: Progressing

## 2021-02-14 NOTE — ASSESSMENT & PLAN NOTE
· Patient does not have a seizure history but does have history of CVA in the past  · Neurology consulted and appreciate input, have signed off at this time ok with plan below  · Continue on Keppra 750mg BID  · EEG 2/2/21 Background activities are too slow suggesting moderate diffuse cerebral dysfunction of nonspecific etiology

## 2021-02-14 NOTE — ASSESSMENT & PLAN NOTE
· Hold home cardizem in setting of hypotension  · Continue metoprolol 25mg BID for rate control  · BP's stable on Midodrine TID

## 2021-02-14 NOTE — ASSESSMENT & PLAN NOTE
· 2/2 hypoxia/hypercapnia and h/o CVA  · delirium precautions, regulate sleep/wake cycles  · Off sedation  · Melatonin hs and Seroquel hs

## 2021-02-14 NOTE — PROGRESS NOTES
Pt is a 60yo M w/ pmhx sig for CVA w/ residual deficits who presented on 1/17/21 after testing positive to 1/16  Pt initially presented to [facility] where he was intubated for hypercapneic respiratory failure  Pt was transitioned to general medical floor but had a deterioration in clinical status in setting of ESBL E Coli which is treated with ertapenem but required reintubation on 2/1/21        24hr events:  Pt returned to [facility] overnight  No acute events    Unable to assess review of systems given intubation    /77 (BP Location: Left arm)   Pulse 96   Temp 98 1 °F (36 7 °C) (Bladder)   Resp (!) 32   Ht 5' 11" (1 803 m)   Wt (!) 167 kg (368 lb 2 7 oz)   SpO2 91%   BMI 51 35 kg/m²   -  Intubated  -  Awake/alert  -  Following simple commands  -  RRR; no m/r/g  -  CTA b/l; no w/r/c  -  Soft nt/nd;   -  2-3+ LE edema  -  No rashes/lesions/ulcers  -  2+ pulses in all 4 extremities  -  No focal neurological deficits  -  No cervical adenopathy  -  [place]    Lab Results   Component Value Date    SODIUM 146 (H) 02/14/2021    K 3 0 (L) 02/14/2021     02/14/2021    CO2 32 02/14/2021    BUN 60 (H) 02/14/2021    CREATININE 1 40 (H) 02/14/2021    GLUC 134 02/14/2021    CALCIUM 8 7 02/14/2021     Lab Results   Component Value Date    WBC 9 58 02/14/2021    HGB 8 9 (L) 02/14/2021    HCT 31 8 (L) 02/14/2021    MCV 85 02/14/2021     02/14/2021     Patient Active Problem List   Diagnosis    Aortic stenosis, mild    Essential hypertension    Hyperlipidemia    Left bundle branch block    Murmur    Osteoarthritis of both knees    Pericardial disease    Sciatica    Age-related cataract of right eye    Venous insufficiency    Bilateral leg edema    Stress-induced cardiomyopathy    History of aortic valve replacement with bioprosthetic valve    Persistent atrial fibrillation (HCC)    Leukocytosis    Acute metabolic encephalopathy    Skin rash    Coagulopathy (HCC)    Age-related nuclear cataract, bilateral    Open angle with borderline findings, low risk, bilateral    Presence of intraocular lens    Vitreous degeneration of both eyes    Left arm swelling    Dysphagia    Left internal jugular vein thrombus    Morbid obesity     Depression    Gastric ulcer    Atrial fibrillation (HCC)    COVID-19    Acute respiratory failure with hypoxia and hypercarbia (HCC)    Acute kidney injury superimposed on CKD (HCC)    Anemia    Seizure (HCC)    History of stroke    CKD (chronic kidney disease), stage III    H/O heart valve replacement with tissue graft    Pneumonia due to Escherichia coli (HCC)       NEURO:  depression  -  fentanyl 25mcg - helped control agitation  -  will consider discontinuation of keppra after extubation  -  Continue sertraline     CV:   atrial fibrillation  -  hemodynamically stable  -  continue metoprolol  -  continue midodrine 10mg TID     PULM:  acute hypoxic respiratory failure; intubated 2/1; IBW 76kg; COVID-19 pneumonia/bacterial pneumonia  -  AC/PC - 18 18/8   50%    20s 450-520ml 94%  -  SBT 6/7 - RR 26 TVs 400s  -  wean FiO2 as tolerated  -  extubate to bipap  -  continue decadron wean - last day 2/15  -  Continue COVID protocol     NEPHRO:  hypernatremia; hypokalemia  -  monitor urine output  -  continue free water flushes  -  Lasix 40mg IV x 1  -  goal net neg 0 5-1L  -  Replete K     ID:  ESBL E Coli pneumonia  -  continue ertapenem (8/10)    GI:  -  Formal swallow eval  -  Place KeoFed    Total critical care time excluding teaching, family meetings and procedures:  32 minutes

## 2021-02-14 NOTE — ASSESSMENT & PLAN NOTE
· approx 1 year ago  · Baseline resident at [facility]  · Plan:  ? Eliquis daily  ?  Asa daily

## 2021-02-14 NOTE — ASSESSMENT & PLAN NOTE
Lab Results   Component Value Date    EGFR 54 02/13/2021    EGFR 52 02/12/2021    EGFR 47 02/11/2021    CREATININE 1 39 (H) 02/13/2021    CREATININE 1 44 (H) 02/12/2021    CREATININE 1 57 (H) 02/11/2021     · Creatine improved; appears to be at baseline   · Continue daily lasix with PRN dosing to maintain euvolemic/negative 500cc  · Renal following  · Cont Strict I/O  · Avoid nephrotoxic meds

## 2021-02-14 NOTE — QUICK NOTE
Provided an update to patient's wife, Matheus Kuhn, over the phone regaring patient's current status and treatments  She was made aware that we will try extubation this evening  She knows that he is very high risk for re-intubation, but wants him to have a chance at success if at all possible  She does want to be called if he gets re-intubated  All questions answered  She expressed understanding and appreciation

## 2021-02-14 NOTE — ASSESSMENT & PLAN NOTE
· Continue home Zoloft BID  · Will need f/u psych eval for recent h/o suicidal ideations - required to return to nursing facility

## 2021-02-14 NOTE — ASSESSMENT & PLAN NOTE
· 2/2 covid-19 with superimposed ESBL e   Coli PNA  · Intubated 2/1-2/16  · Extubated 2/16  · Off decadron 2/15  · Plan:   · Continue [address], titrate to maintain SPO2 >88%  · Ertapenem day 10/10

## 2021-02-15 LAB
ANION GAP SERPL CALCULATED.3IONS-SCNC: 6 MMOL/L (ref 4–13)
ANION GAP SERPL CALCULATED.3IONS-SCNC: 6 MMOL/L (ref 4–13)
ANION GAP SERPL CALCULATED.3IONS-SCNC: 9 MMOL/L (ref 4–13)
APTT PPP: 68 SECONDS (ref 23–37)
BASOPHILS # BLD AUTO: 0.04 THOUSANDS/ΜL (ref 0–0.1)
BASOPHILS NFR BLD AUTO: 0 % (ref 0–1)
BUN SERPL-MCNC: 53 MG/DL (ref 5–25)
BUN SERPL-MCNC: 54 MG/DL (ref 5–25)
BUN SERPL-MCNC: 55 MG/DL (ref 5–25)
CALCIUM SERPL-MCNC: 8.7 MG/DL (ref 8.3–10.1)
CALCIUM SERPL-MCNC: 8.9 MG/DL (ref 8.3–10.1)
CALCIUM SERPL-MCNC: 8.9 MG/DL (ref 8.3–10.1)
CHLORIDE SERPL-SCNC: 109 MMOL/L (ref 100–108)
CHLORIDE SERPL-SCNC: 110 MMOL/L (ref 100–108)
CHLORIDE SERPL-SCNC: 111 MMOL/L (ref 100–108)
CO2 SERPL-SCNC: 31 MMOL/L (ref 21–32)
CO2 SERPL-SCNC: 32 MMOL/L (ref 21–32)
CO2 SERPL-SCNC: 32 MMOL/L (ref 21–32)
CREAT SERPL-MCNC: 1.24 MG/DL (ref 0.6–1.3)
CREAT SERPL-MCNC: 1.28 MG/DL (ref 0.6–1.3)
CREAT SERPL-MCNC: 1.4 MG/DL (ref 0.6–1.3)
EOSINOPHIL # BLD AUTO: 1.03 THOUSAND/ΜL (ref 0–0.61)
EOSINOPHIL NFR BLD AUTO: 11 % (ref 0–6)
ERYTHROCYTE [DISTWIDTH] IN BLOOD BY AUTOMATED COUNT: 25.4 % (ref 11.6–15.1)
GFR SERPL CREATININE-BSD FRML MDRD: 54 ML/MIN/1.73SQ M
GFR SERPL CREATININE-BSD FRML MDRD: 60 ML/MIN/1.73SQ M
GFR SERPL CREATININE-BSD FRML MDRD: 62 ML/MIN/1.73SQ M
GLUCOSE SERPL-MCNC: 100 MG/DL (ref 65–140)
GLUCOSE SERPL-MCNC: 126 MG/DL (ref 65–140)
GLUCOSE SERPL-MCNC: 81 MG/DL (ref 65–140)
GLUCOSE SERPL-MCNC: 90 MG/DL (ref 65–140)
GLUCOSE SERPL-MCNC: 91 MG/DL (ref 65–140)
GLUCOSE SERPL-MCNC: 95 MG/DL (ref 65–140)
HCT VFR BLD AUTO: 32.4 % (ref 36.5–49.3)
HGB BLD-MCNC: 9 G/DL (ref 12–17)
IMM GRANULOCYTES # BLD AUTO: 0.2 THOUSAND/UL (ref 0–0.2)
IMM GRANULOCYTES NFR BLD AUTO: 2 % (ref 0–2)
LYMPHOCYTES # BLD AUTO: 0.59 THOUSANDS/ΜL (ref 0.6–4.47)
LYMPHOCYTES NFR BLD AUTO: 6 % (ref 14–44)
MCH RBC QN AUTO: 24 PG (ref 26.8–34.3)
MCHC RBC AUTO-ENTMCNC: 27.8 G/DL (ref 31.4–37.4)
MCV RBC AUTO: 86 FL (ref 82–98)
MONOCYTES # BLD AUTO: 0.64 THOUSAND/ΜL (ref 0.17–1.22)
MONOCYTES NFR BLD AUTO: 7 % (ref 4–12)
NEUTROPHILS # BLD AUTO: 7.04 THOUSANDS/ΜL (ref 1.85–7.62)
NEUTS SEG NFR BLD AUTO: 74 % (ref 43–75)
NRBC BLD AUTO-RTO: 0 /100 WBCS
PHOSPHATE SERPL-MCNC: 3.9 MG/DL (ref 2.3–4.1)
PLATELET # BLD AUTO: 161 THOUSANDS/UL (ref 149–390)
PMV BLD AUTO: 11.5 FL (ref 8.9–12.7)
POTASSIUM SERPL-SCNC: 2.7 MMOL/L (ref 3.5–5.3)
POTASSIUM SERPL-SCNC: 3.2 MMOL/L (ref 3.5–5.3)
POTASSIUM SERPL-SCNC: 3.8 MMOL/L (ref 3.5–5.3)
RBC # BLD AUTO: 3.75 MILLION/UL (ref 3.88–5.62)
SODIUM SERPL-SCNC: 147 MMOL/L (ref 136–145)
SODIUM SERPL-SCNC: 148 MMOL/L (ref 136–145)
SODIUM SERPL-SCNC: 151 MMOL/L (ref 136–145)
WBC # BLD AUTO: 9.54 THOUSAND/UL (ref 4.31–10.16)

## 2021-02-15 PROCEDURE — 94150 VITAL CAPACITY TEST: CPT

## 2021-02-15 PROCEDURE — 85025 COMPLETE CBC W/AUTO DIFF WBC: CPT | Performed by: NURSE PRACTITIONER

## 2021-02-15 PROCEDURE — 80048 BASIC METABOLIC PNL TOTAL CA: CPT | Performed by: FAMILY MEDICINE

## 2021-02-15 PROCEDURE — 94762 N-INVAS EAR/PLS OXIMTRY CONT: CPT

## 2021-02-15 PROCEDURE — 94760 N-INVAS EAR/PLS OXIMETRY 1: CPT

## 2021-02-15 PROCEDURE — 80048 BASIC METABOLIC PNL TOTAL CA: CPT | Performed by: NURSE PRACTITIONER

## 2021-02-15 PROCEDURE — 85730 THROMBOPLASTIN TIME PARTIAL: CPT | Performed by: INTERNAL MEDICINE

## 2021-02-15 PROCEDURE — 82948 REAGENT STRIP/BLOOD GLUCOSE: CPT

## 2021-02-15 PROCEDURE — 99291 CRITICAL CARE FIRST HOUR: CPT | Performed by: INTERNAL MEDICINE

## 2021-02-15 PROCEDURE — 84100 ASSAY OF PHOSPHORUS: CPT | Performed by: NURSE PRACTITIONER

## 2021-02-15 PROCEDURE — 94003 VENT MGMT INPAT SUBQ DAY: CPT

## 2021-02-15 RX ORDER — POTASSIUM CHLORIDE 20MEQ/15ML
40 LIQUID (ML) ORAL ONCE
Status: COMPLETED | OUTPATIENT
Start: 2021-02-16 | End: 2021-02-16

## 2021-02-15 RX ORDER — FENTANYL CITRATE-0.9 % NACL/PF 10 MCG/ML
12.5 PLASTIC BAG, INJECTION (ML) INTRAVENOUS CONTINUOUS
Status: DISCONTINUED | OUTPATIENT
Start: 2021-02-15 | End: 2021-02-16

## 2021-02-15 RX ORDER — POTASSIUM CHLORIDE 20MEQ/15ML
40 LIQUID (ML) ORAL ONCE
Status: COMPLETED | OUTPATIENT
Start: 2021-02-15 | End: 2021-02-15

## 2021-02-15 RX ORDER — FUROSEMIDE 10 MG/ML
40 INJECTION INTRAMUSCULAR; INTRAVENOUS
Status: DISCONTINUED | OUTPATIENT
Start: 2021-02-15 | End: 2021-02-18

## 2021-02-15 RX ORDER — POTASSIUM CHLORIDE 20MEQ/15ML
40 LIQUID (ML) ORAL
Status: COMPLETED | OUTPATIENT
Start: 2021-02-15 | End: 2021-02-15

## 2021-02-15 RX ORDER — FUROSEMIDE 10 MG/ML
40 INJECTION INTRAMUSCULAR; INTRAVENOUS ONCE
Status: COMPLETED | OUTPATIENT
Start: 2021-02-15 | End: 2021-02-15

## 2021-02-15 RX ORDER — POTASSIUM CHLORIDE 20MEQ/15ML
20 LIQUID (ML) ORAL ONCE
Status: COMPLETED | OUTPATIENT
Start: 2021-02-15 | End: 2021-02-15

## 2021-02-15 RX ADMIN — MIDODRINE HYDROCHLORIDE 10 MG: 5 TABLET ORAL at 15:07

## 2021-02-15 RX ADMIN — POTASSIUM CHLORIDE 20 MEQ: 20 SOLUTION ORAL at 15:16

## 2021-02-15 RX ADMIN — ASPIRIN 81 MG CHEWABLE TABLET 81 MG: 81 TABLET CHEWABLE at 08:21

## 2021-02-15 RX ADMIN — POTASSIUM CHLORIDE 20 MEQ: 14.9 INJECTION, SOLUTION INTRAVENOUS at 08:23

## 2021-02-15 RX ADMIN — FUROSEMIDE 40 MG: 10 INJECTION, SOLUTION INTRAMUSCULAR; INTRAVENOUS at 09:41

## 2021-02-15 RX ADMIN — Medication 6 MG: at 22:05

## 2021-02-15 RX ADMIN — LEVETIRACETAM 750 MG: 100 SOLUTION ORAL at 09:39

## 2021-02-15 RX ADMIN — POTASSIUM CHLORIDE 20 MEQ: 14.9 INJECTION, SOLUTION INTRAVENOUS at 10:13

## 2021-02-15 RX ADMIN — POTASSIUM CHLORIDE 40 MEQ: 20 SOLUTION ORAL at 22:04

## 2021-02-15 RX ADMIN — SERTRALINE 75 MG: 25 TABLET, FILM COATED ORAL at 08:22

## 2021-02-15 RX ADMIN — POTASSIUM CHLORIDE 40 MEQ: 20 SOLUTION ORAL at 08:21

## 2021-02-15 RX ADMIN — FUROSEMIDE 40 MG: 10 INJECTION, SOLUTION INTRAVENOUS at 22:04

## 2021-02-15 RX ADMIN — Medication 20 MG: at 15:07

## 2021-02-15 RX ADMIN — FUROSEMIDE 40 MG: 10 INJECTION, SOLUTION INTRAVENOUS at 15:07

## 2021-02-15 RX ADMIN — DEXAMETHASONE SODIUM PHOSPHATE 2 MG: 4 INJECTION, SOLUTION INTRA-ARTICULAR; INTRALESIONAL; INTRAMUSCULAR; INTRAVENOUS; SOFT TISSUE at 08:22

## 2021-02-15 RX ADMIN — Medication 2000 UNITS: at 08:21

## 2021-02-15 RX ADMIN — MIDODRINE HYDROCHLORIDE 10 MG: 5 TABLET ORAL at 06:16

## 2021-02-15 RX ADMIN — Medication 20 MG: at 04:00

## 2021-02-15 RX ADMIN — CHLORHEXIDINE GLUCONATE 0.12% ORAL RINSE 15 ML: 1.2 LIQUID ORAL at 22:03

## 2021-02-15 RX ADMIN — LEVETIRACETAM 750 MG: 100 SOLUTION ORAL at 21:30

## 2021-02-15 RX ADMIN — DESMOPRESSIN ACETATE 40 MG: 0.2 TABLET ORAL at 22:03

## 2021-02-15 RX ADMIN — POTASSIUM CHLORIDE 40 MEQ: 20 SOLUTION ORAL at 06:16

## 2021-02-15 RX ADMIN — MIDODRINE HYDROCHLORIDE 10 MG: 5 TABLET ORAL at 22:03

## 2021-02-15 RX ADMIN — HEPARIN SODIUM 14 UNITS/KG/HR: 10000 INJECTION, SOLUTION INTRAVENOUS at 04:24

## 2021-02-15 RX ADMIN — HEPARIN SODIUM 14 UNITS/KG/HR: 10000 INJECTION, SOLUTION INTRAVENOUS at 17:45

## 2021-02-15 RX ADMIN — Medication 12.5 MG: at 08:22

## 2021-02-15 RX ADMIN — Medication 1 TABLET: at 08:21

## 2021-02-15 RX ADMIN — ERTAPENEM SODIUM 1000 MG: 1 INJECTION, POWDER, LYOPHILIZED, FOR SOLUTION INTRAMUSCULAR; INTRAVENOUS at 10:13

## 2021-02-15 RX ADMIN — CHLORHEXIDINE GLUCONATE 0.12% ORAL RINSE 15 ML: 1.2 LIQUID ORAL at 08:21

## 2021-02-15 RX ADMIN — Medication 12.5 MG: at 22:00

## 2021-02-15 RX ADMIN — SERTRALINE HYDROCHLORIDE 50 MG: 50 TABLET ORAL at 22:03

## 2021-02-15 NOTE — QUICK NOTE
I called and spoke with the patient's wife, Norma Mckeon and updated her on his overnight events and current status  I informed her that he was not extubated overnight, however we hope to attempt extubation tomorrow  I informed her of our other plans as well and answered any questions  She expressed understanding and was appreciative of the call

## 2021-02-15 NOTE — PLAN OF CARE
Problem: Prexisting or High Potential for Compromised Skin Integrity  Goal: Skin integrity is maintained or improved  Description: INTERVENTIONS:  - Identify patients at risk for skin breakdown  - Assess and monitor skin integrity  - Assess and monitor nutrition and hydration status  - Monitor labs   - Assess for incontinence   - Turn and reposition patient  - Assist with mobility/ambulation  - Relieve pressure over bony prominences  - Avoid friction and shearing  - Provide appropriate hygiene as needed including keeping skin clean and dry  - Evaluate need for skin moisturizer/barrier cream  - Collaborate with interdisciplinary team   - Patient/family teaching  - Consider wound care consult   Outcome: Progressing     Problem: Potential for Falls  Goal: Patient will remain free of falls  Description: INTERVENTIONS:  - Assess patient frequently for physical needs  -  Identify cognitive and physical deficits and behaviors that affect risk of falls    -  [place] fall precautions as indicated by assessment   - Educate patient/family on patient safety including physical limitations  - Instruct patient to call for assistance with activity based on assessment  - Modify environment to reduce risk of injury  - Consider OT/PT consult to assist with strengthening/mobility  Outcome: Progressing     Problem: SAFETY,RESTRAINT: NV/NON-SELF DESTRUCTIVE BEHAVIOR  Goal: Remains free of harm/injury (restraint for non violent/non self-detsructive behavior)  Description: INTERVENTIONS:  - Instruct patient/family regarding restraint use   - Assess and monitor physiologic and psychological status   - Provide interventions and comfort measures to meet assessed patient needs   - Identify and implement measures to help patient regain control  - Assess readiness for release of restraint   Outcome: Progressing  Goal: Returns to optimal restraint-free functioning  Description: INTERVENTIONS:  - Assess the patient's behavior and symptoms that indicate continued need for restraint  - Identify and implement measures to help patient regain control  - Assess readiness for release of restraint   Outcome: Progressing     Problem: Nutrition/Hydration-ADULT  Goal: Nutrient/Hydration intake appropriate for improving, restoring or maintaining nutritional needs  Description: Monitor and assess patient's nutrition/hydration status for malnutrition  Collaborate with interdisciplinary team and initiate plan and interventions as ordered  Monitor patient's weight and dietary intake as ordered or per policy  Utilize nutrition screening tool and intervene as necessary  Determine patient's food preferences and provide high-protein, high-caloric foods as appropriate       INTERVENTIONS:  - Monitor oral intake, urinary output, labs, and treatment plans  - Assess nutrition and hydration status and recommend course of action  - Evaluate amount of meals eaten  - Assist patient with eating if necessary   - Allow adequate time for meals  - Recommend/ encourage appropriate diets, oral nutritional supplements, and vitamin/mineral supplements  - Order, calculate, and assess calorie counts as needed  - Recommend, monitor, and adjust tube feedings and TPN/PPN based on assessed needs  - Assess need for intravenous fluids  - Provide specific nutrition/hydration education as appropriate  - Include patient/family/caregiver in decisions related to nutrition  Outcome: Progressing     Problem: NEUROSENSORY - ADULT  Goal: Achieves stable or improved neurological status  Description: INTERVENTIONS  - Monitor and report changes in neurological status  - Monitor vital signs such as temperature, blood pressure, glucose, and any other labs ordered   - Initiate measures to prevent increased intracranial pressure  - Monitor for seizure activity and implement precautions if appropriate      Outcome: Progressing  Goal: Achieves maximal functionality and self care  Description: INTERVENTIONS  - Monitor swallowing and airway patency with patient fatigue and changes in neurological status  - Encourage and assist patient to increase activity and self care     - Encourage visually impaired, hearing impaired and aphasic patients to use assistive/communication devices  Outcome: Progressing     Problem: CARDIOVASCULAR - ADULT  Goal: Maintains optimal cardiac output and hemodynamic stability  Description: INTERVENTIONS:  - Monitor I/O, vital signs and rhythm  - Monitor for S/S and trends of decreased cardiac output  - Administer and titrate ordered vasoactive medications to optimize hemodynamic stability  - Assess quality of pulses, skin color and temperature  - Assess for signs of decreased coronary artery perfusion  - Instruct patient to report change in severity of symptoms  Outcome: Progressing  Goal: Absence of cardiac dysrhythmias or at baseline rhythm  Description: INTERVENTIONS:  - Continuous cardiac monitoring, vital signs, obtain 12 lead EKG if ordered  - Administer antiarrhythmic and heart rate control medications as ordered  - Monitor electrolytes and administer replacement therapy as ordered  Outcome: Progressing     Problem: RESPIRATORY - ADULT  Goal: Achieves optimal ventilation and oxygenation  Description: INTERVENTIONS:  - Assess for changes in respiratory status  - Assess for changes in mentation and behavior  - Position to facilitate oxygenation and minimize respiratory effort  - Oxygen administered by appropriate delivery if ordered  - Initiate smoking cessation education as indicated  - Encourage broncho-pulmonary hygiene including cough, deep breathe, Incentive Spirometry  - Assess the need for suctioning and aspirate as needed  - Assess and instruct to report SOB or any respiratory difficulty  - Respiratory Therapy support as indicated  Outcome: Progressing     Problem: GENITOURINARY - ADULT  Goal: Maintains or returns to baseline urinary function  Description: INTERVENTIONS:  - Assess urinary function  - Encourage oral fluids to ensure adequate hydration if ordered  - Administer IV fluids as ordered to ensure adequate hydration  - Administer ordered medications as needed  - Offer frequent toileting  - Follow urinary retention protocol if ordered  Outcome: Progressing  Goal: Absence of urinary retention  Description: INTERVENTIONS:  - Assess patients ability to void and empty bladder  - Monitor I/O  - Bladder scan as needed  - Discuss with physician/AP medications to alleviate retention as needed  - Discuss catheterization for long term situations as appropriate  Outcome: Progressing  Goal: Urinary catheter remains patent  Description: INTERVENTIONS:  - Assess patency of urinary catheter  - If patient has a chronic bo, consider changing catheter if non-functioning  - Follow guidelines for intermittent irrigation of non-functioning urinary catheter  Outcome: Progressing     Problem: METABOLIC, FLUID AND ELECTROLYTES - ADULT  Goal: Electrolytes maintained within normal limits  Description: INTERVENTIONS:  - Monitor labs and assess patient for signs and symptoms of electrolyte imbalances  - Administer electrolyte replacement as ordered  - Monitor response to electrolyte replacements, including repeat lab results as appropriate  - Instruct patient on fluid and nutrition as appropriate  Outcome: Progressing  Goal: Fluid balance maintained  Description: INTERVENTIONS:  - Monitor labs   - Monitor I/O and WT  - Instruct patient on fluid and nutrition as appropriate  - Assess for signs & symptoms of volume excess or deficit  Outcome: Progressing  Goal: Glucose maintained within target range  Description: INTERVENTIONS:  - Monitor Blood Glucose as ordered  - Assess for signs and symptoms of hyperglycemia and hypoglycemia  - Administer ordered medications to maintain glucose within target range  - Assess nutritional intake and initiate nutrition service referral as needed  Outcome: Progressing     Problem: MUSCULOSKELETAL - ADULT  Goal: Maintain or return mobility to safest level of function  Description: INTERVENTIONS:  - Assess patient's ability to carry out ADLs; assess patient's baseline for ADL function and identify physical deficits which impact ability to perform ADLs (bathing, care of mouth/teeth, toileting, grooming, dressing, etc )  - Assess/evaluate cause of self-care deficits   - Assess range of motion  - Assess patient's mobility  - Assess patient's need for assistive devices and provide as appropriate  - Encourage maximum independence but intervene and supervise when necessary  - Involve family in performance of ADLs  - Assess for home care needs following discharge   - Consider OT consult to assist with ADL evaluation and planning for discharge  - Provide patient education as appropriate  Outcome: Progressing  Goal: Maintain proper alignment of affected body part  Description: INTERVENTIONS:  - Support, maintain and protect limb and body alignment  - Provide patient/ family with appropriate education  Outcome: Progressing     Problem: INFECTION - ADULT  Goal: Absence or prevention of progression during hospitalization  Description: INTERVENTIONS:  - Assess and monitor for signs and symptoms of infection  - Monitor lab/diagnostic results  - Monitor all insertion sites, i e  indwelling lines, tubes, and drains  - Monitor endotracheal if appropriate and nasal secretions for changes in amount and color  - [place] appropriate cooling/warming therapies per order  - Administer medications as ordered  - Instruct and encourage patient and family to use good hand hygiene technique  - Identify and instruct in appropriate isolation precautions for identified infection/condition  Outcome: Progressing     Problem: DISCHARGE PLANNING  Goal: Discharge to home or other facility with appropriate resources  Description: INTERVENTIONS:  - Identify barriers to discharge w/patient and caregiver  - Arrange for needed discharge resources and transportation as appropriate  - Identify discharge learning needs (meds, wound care, etc )  - Arrange for interpretive services to assist at discharge as needed  - Refer to Case Management Department for coordinating discharge planning if the patient needs post-hospital services based on physician/advanced practitioner order or complex needs related to functional status, cognitive ability, or social support system  Outcome: Progressing     Problem: Knowledge Deficit  Goal: Patient/family/caregiver demonstrates understanding of disease process, treatment plan, medications, and discharge instructions  Description: Complete learning assessment and assess knowledge base    Interventions:  - Provide teaching at level of understanding  - Provide teaching via preferred learning methods  Outcome: Progressing

## 2021-02-15 NOTE — PROGRESS NOTES
Daily Progress Note - Critical Care   Zeb Ontiveros 63 y o  male MRN: [id number]  Unit/Bed#: ICU 08 Encounter: [id number]        ----------------------------------------------------------------------------------------  HPI/24hr events: 63year old male admitted due to acute hypercapneic respiratory failure in the setting of Ccovid-19 PNA with superimposed ESBL E coli PNA  Overnight, patient remained on PS 6/7/50%  Patient on ertapenem day 8/10  Hemodynamically stable and afebrile      ---------------------------------------------------------------------------------------  SUBJECTIVE  Unable to offer verbal complaints at this time  Follows simple commands  Review of Systems   Unable to perform ROS: Intubated   Cardiovascular: Positive for leg swelling  Negative for chest pain  Gastrointestinal: Negative for abdominal distention and abdominal pain       Review of systems was reviewed and negative unless stated above in HPI/24-hour events   ---------------------------------------------------------------------------------------  Assessment and Plan:    Neuro:    Diagnosis: Acute metabolic encephalopathy   o Plan:   - Secondary to hypoxia/hypercapnia and history of CVA  - Delirium precautions  - CAM-ICU daily  - Daily sedation vacation   - Regulate sleep/wake cycle   - Continue melatonin HS  - Continue seroquel HS  - Continue fentanyl @25 for agitation   - Tylenol PRN pain    Diagnosis: Seizure   o Plan:   - EEG 2/2/21 Background activities are too slow suggesting moderate diffuse cerebral dysfunction of nonspecific etiology    - Neurology with no intervention at this time   - Continue on Keppra 750mg BID- wean once extubated    Diagnosis: Hx of CVA   o Plan:   - Contiue heparin gtt  - Continue aspirin   - Baseline resident at [address] [place] [address] Diagnosis: Depression   o Plan:   - Continue Zoloft        CV:    Diagnosis: Atrial fibrillation   o Plan:   - Continue metoprolol 12 5mg BID   - Not on [facility] [place] at home secondary to history of GI bleed    - Continue heparin gtt   - Continue telemetry    Diagnosis: Cardiomyopathy   o Plan:   - Echo 7/2020: EF 50%, LBBB    - Continue midodrine 10mg TID   - Home bumex on hold   - Continue 40 lasix daily    Diagnosis: Essential HTN   o Plan:   - Continue metoprolol 12 5mg BID   - Home regimen: cardizem 180mg daily   - Metoprolol 50mg TID    Diagnosis: Hyperlipidemia   o Plan:   - Continue atorvastatin       Pulm:   Diagnosis: Acute hypercapnic respiratory failure   o Plan:   - Secondary to Covid-19 with superimposed ESBL E  Coli PNA   - Previously intubated 1/17-1/18   - Reintubated 2/1   - MV day 15  - Patient on PS overnight 8/7/50%  - Titrate FiO2 to maintain SpO2>88%  - SBT daily with possible extubation   - Extubate to Bipap   - If patient fails extubation consider trach/PEG   - Ventilator bundle   - Continue decadron wean    Diagnosis: PNA due to ESBL E  Coli   o Plan:   -  Continue Ertapenem day 8/10      GI:    Diagnosis: Dysphagia   o Plan:   - Formal swallow eval   - S/p Keofed   - GI ppx: omeprazole   Diagnosis: Diarrhea  o Plan:   - Rectal tube in place   - Bowel regimen: miralax, senokot, dulcolax on hold         :    Diagnosis: PATRICK superimposed on CKD Stage III   o Plan:   - Continue to trend BUN/Cr  - Continue lasix daily   - Strict I/O   - Avoid nephrotoxic medications       F/E/N:    Plan:   o Fluids: No maintenance fluids; albumin/diurese as needed   o Electrolytes:  Will replete as necessary to maintain potassium > 4 0, magnesium > 2 0, and phosphrous > 3 0    o Nutrition: TFs on hold in setting of extubation; restart when able       Heme/Onc:    Diagnosis: Anemia   o Plan:   - Continue to monitor blood counts  - Transfuse as needed for Hgb <7   - No active source of bleeding         Endo:    Diagnosis: Morbid Obesity   o Plan:   - Nutrition consult when appropriate   - Blood glucose goal 140-180      ID:    Diagnosis: Covid-19 PNA with superimposed ESBL E Coli PNA   o Plan:   - Continue severe covid pathway   - Confirmed positive 1/17  - Continue decadron wean- off today   - Continue Vit C, Zinc, MV   - Continue statin   - Continue omeprazole   - Continue heparin gtt   - Completed therapies:    Actemra 1/28   Convalescent plasma 1/17   Not given remdesivir   - Continue Ertapenum for superimposed ESBL E  Coli bacterial PNA (Day 8/10)   - Continue to trend inflammatory markers q1-3 days    D-dimer: 1 47 (2/6)    Ferritin: 170 (2/6)    CRP: 66 7 (2/6)    - Procal: 0 45 (0 96)       MSK/Skin:    Diagnosis: Lower extremity bilateral swelling   o Plan:   - Elevate lower extremities   - S/p additional 40 IV lasix 2/14   - PT/OT when able   - Local wound care as needed       Disposition: Continue Critical Care   Code Status: Level 1 - Full Code  ---------------------------------------------------------------------------------------  ICU CORE MEASURES    Prophylaxis   VTE Pharmacologic Prophylaxis: Heparin Drip  VTE Mechanical Prophylaxis: sequential compression device  Stress Ulcer Prophylaxis: Pantoprazole PO    ABCDE Protocol (if indicated)  Plan to perform spontaneous awakening trial today? Yes  Plan to perform spontaneous breathing trial today? Yes  Obvious barriers to extubation? Yes  CAM-ICU: Negative    Invasive Devices Review  Invasive Devices     Peripheral Intravenous Line            Long-Dwell Peripheral IV (Midline) 79/27/66 Left Cephalic 20 days          Drain            Urethral Catheter Straight-tip 16 Fr  28 days    NG/OG/Enteral Tube Orogastric 13 days    Rectal Tube With balloon 1 day          Airway            ETT  Cuffed; Hi-Lo 8 mm 13 days              Can any invasive devices be discontinued today?  Yes  ---------------------------------------------------------------------------------------  OBJECTIVE    Vitals   Vitals:    02/14/21 2043 02/14/21 2100 02/14/21 2200 02/14/21 2240   BP:  127/77 117/61    BP Location:       Pulse: 102 86    Resp:  22 (!) 25    Temp:  98 2 °F (36 8 °C) 98 4 °F (36 9 °C)    TempSrc:       SpO2: 94% 94% 92% 95%   Weight:       Height:         Temp (24hrs), Av 3 °F (36 8 °C), Min:97 9 °F (36 6 °C), Max:98 6 °F (37 °C)  Current: Temperature: 98 4 °F (36 9 °C)    Respiratory:  SpO2: SpO2: 95 %, SpO2 Activity: SpO2 Activity: At Rest, SpO2 Device: O2 Device: Ventilator  Nasal Cannula O2 Flow Rate (L/min): 50 L/min    Invasive/non-invasive ventilation settings   Respiratory    Lab Data (Last 4 hours)    None         O2/Vent Data (Last 4 hours)    None                Physical Exam  Constitutional:       General: He is not in acute distress  Appearance: He is obese  He is ill-appearing  He is not diaphoretic  HENT:      Head: Normocephalic and atraumatic  Nose: Nose normal       Mouth/Throat:      Mouth: Mucous membranes are moist       Pharynx: Oropharynx is clear  Comments: ETT/OG  Eyes:      General: No scleral icterus  Conjunctiva/sclera: Conjunctivae normal       Pupils: Pupils are equal, round, and reactive to light  Neck:      Musculoskeletal: Normal range of motion and neck supple  Cardiovascular:      Rate and Rhythm: Normal rate  Rhythm irregular  Heart sounds: Normal heart sounds  Comments: Bigeminy noted   Pulmonary:      Effort: Pulmonary effort is normal  No respiratory distress  Abdominal:      General: Bowel sounds are normal  There is distension  Palpations: Abdomen is soft  Tenderness: There is no abdominal tenderness  Genitourinary:     Comments: Turner/Rectal tube   Musculoskeletal: Normal range of motion  Right lower leg: Edema present  Left lower leg: Edema present  Skin:     General: Skin is warm and dry  Capillary Refill: Capillary refill takes less than 2 seconds  Coloration: Skin is not jaundiced  Neurological:      General: No focal deficit present  Mental Status: He is alert     Psychiatric:         Mood and Affect: Mood normal          Thought Content:  Thought content normal          Laboratory and Diagnostics:  Results from last 7 days   Lab Units 02/14/21  0459 02/13/21  0438 02/11/21  0533 02/10/21  0347 02/09/21  0434 02/08/21  0449   WBC Thousand/uL 9 58 8 23 9 24 9 85 10 37* 12 93*   HEMOGLOBIN g/dL 8 9* 9 3* 9 0* 9 6* 9 8* 10 1*   HEMATOCRIT % 31 8* 33 8* 32 8* 33 5* 34 2* 35 6*   PLATELETS Thousands/uL 171 193 187 201 194 234   NEUTROS PCT %  --   --   --   --  68  --    BANDS PCT % 1 2 3 4  --  2   MONOS PCT %  --   --   --   --  10  --    MONO PCT % 7 7 5 10  --  7     Results from last 7 days   Lab Units 02/14/21  1747 02/14/21  0459 02/13/21  0438 02/12/21  0433 02/11/21  0533 02/10/21  1930 02/10/21  0347   SODIUM mmol/L 146* 146* 149* 146* 145 146* 144   POTASSIUM mmol/L 3 2* 3 0* 3 3* 3 3* 3 4* 3 5 3 1*   CHLORIDE mmol/L 107 108 109* 107 106 107 105   CO2 mmol/L 33* 32 32 32 32 33* 34*   ANION GAP mmol/L 6 6 8 7 7 6 5   BUN mg/dL 58* 60* 64* 73* 70* 71* 72*   CREATININE mg/dL 1 43* 1 40* 1 39* 1 44* 1 57* 1 63* 1 62*   CALCIUM mg/dL 8 5 8 7 8 5 8 6 8 5 8 5 8 3   GLUCOSE RANDOM mg/dL 106 134 124 134 126 116 132     Results from last 7 days   Lab Units 02/14/21  0459 02/13/21  0438 02/12/21  0433 02/11/21  0533 02/10/21  1930 02/10/21  0347 02/09/21  0434 02/08/21  0449   MAGNESIUM mg/dL 2 7* 3 0* 3 2* 3 0* 2 6 2 5 2 6 2 4   PHOSPHORUS mg/dL  --   --   --   --   --  4 5*  --  4 6*      Results from last 7 days   Lab Units 02/14/21  0459 02/13/21  1839 02/13/21  1223 02/13/21  0438 02/12/21  0433 02/11/21  0533 02/10/21  0512   PTT seconds 72* 83* 67* 58* 74* 84* 72*              ABG:  Results from last 7 days   Lab Units 02/09/21  0733   PH ART  7 389   PCO2 ART mm Hg 54 0*   PO2 ART mm Hg 59 9*   HCO3 ART mmol/L 31 9*   BASE EXC ART mmol/L 5 8   ABG SOURCE  Line, Arterial     VBG:  Results from last 7 days   Lab Units 02/09/21  0733   ABG SOURCE  Line, Arterial           Micro        EKG: Reviewed Imaging:  I have personally reviewed pertinent films in PACS    Intake and Output  I/O       02/13 0701 - 02/14 0700 02/14 0701 - 02/15 0700    I V  (mL/kg) 541 (3 2) 305 (1 8)    NG/ 720    IV Piggyback 50 50    Feedings 995 450    Total Intake(mL/kg) 2576 (15 4) 1525 (9 1)    Urine (mL/kg/hr) 1890 (0 5) 2090 (0 8)    Emesis/NG output  0    Stool 250 300    Total Output 2140 2390    Net +436 -865          Unmeasured Stool Occurrence  2 x        UOP: 87 ml/hr     Height and Weights   Height: 5' 11" (180 3 cm)  IBW: 75 3 kg  Body mass index is 51 35 kg/m²  Weight (last 2 days)     Date/Time   Weight    02/14/21 0600   (!) 167 (368 17)    02/13/21 0600   (!) 164 (360 67)                Nutrition       Diet Orders   (From admission, onward)             Start     Ordered    02/13/21 0937  Diet Enteral/Parenteral; Tube Feeding No Oral Diet; Jevity 1 5; Continuous; 45; Prosource Protein Liquid - Two Packets; 250; Every 4 hours  Diet effective now     Question Answer Comment   Diet Type Enteral/Parenteral    Enteral/Parenteral Tube Feeding No Oral Diet    Tube Feeding Formula: Jevity 1 5    Bolus/Cyclic/Continuous Continuous    Tube Feeding Goal Rate (mL/hr): 45    Prosource Protein Liquid - No Carb Prosource Protein Liquid - Two Packets    Tube Feeding flush (mL): 250    Water flush frequency: Every 4 hours    RD to adjust diet per protocol? Yes        02/13/21 0936    01/17/21 1719  Room Service  Once     Question:  Type of Service  Answer:  Room Service- Not Appropriate    01/17/21 1718              TF currently running at 0 ml/hr with a goal of 45 ml/hr   Formula: Jevity 1 5       Active Medications  Scheduled Meds:  Current Facility-Administered Medications   Medication Dose Route Frequency Provider Last Rate    acetaminophen  650 mg Per NG Tube Q4H PRN Irma HERBERT Barnes      aspirin  81 mg Oral Daily Voncille KATHY Londono      atorvastatin  40 mg Per NG Tube HS Irma HERBERT Barnes      bisacodyl  10 mg Rectal Daily PRN Kanu Eugene PA-C      chlorhexidine  15 mL [facility] Kanu Eugene, [place]      cholecalciferol  2,000 Units Per NG Tube Daily Kanu Eugene PA-C      dexamethasone  2 mg Intravenous Daily [facility], CRNP      ertapenem  1,000 mg Intravenous Q24H [facility], CRNP 1,000 mg (02/14/21 1137)    fentanyl citrate (PF)  50 mcg Intravenous Q1H PRN [facility], CRNP      furosemide  40 mg Intravenous Daily Shell Willson PA-C      heparin (porcine)  3-30 Units/kg/hr (Order-Specific) Intravenous Titrated Lucian Keo, DO 14 Units/kg/hr (02/14/21 1800)    heparin (porcine)  10,000 Units Intravenous Q1H PRN Lucian Keo, DO      heparin (porcine)  5,000 Units Intravenous Q1H PRN Lucian Keo, DO      insulin lispro  1-6 Units Subcutaneous Q6H [address] [facility], CRNP      levETIRAcetam  750 mg Oral Q12H [address] [facility], CRNP      melatonin  6 mg Oral HS [facility], CRNP      metoprolol tartrate  12 5 mg Oral Q12H [address] Chirag Rakers, CRNP      midodrine  10 mg Oral Q8H Chirag Jordy, CRNP      multivitamin-minerals  1 tablet Oral Daily Mariia Manzano,       omeprazole (PRILOSEC) suspension 2 mg/mL  20 mg Oral Q12H [facility], CRNP      polyethylene glycol  17 g Oral BID Kanu Eugene PA-C      potassium chloride  20 mEq Intravenous 4x Daily Verlinsarah Bhat PA-C 20 mEq (02/14/21 2227)    senna-docusate sodium  1 tablet Per NG Tube BID [facility], CRNP      sertraline  50 mg Oral HS [facility], CRNP      sertraline  75 mg Oral Daily KATHY Domingo       Continuous Infusions:  heparin (porcine), 3-30 Units/kg/hr (Order-Specific), Last Rate: 14 Units/kg/hr (02/14/21 1800)      PRN Meds:   acetaminophen, 650 mg, Q4H PRN  bisacodyl, 10 mg, Daily PRN  fentanyl citrate (PF), 50 mcg, Q1H PRN  heparin (porcine), 10,000 Units, Q1H PRN  heparin (porcine), 5,000 Units, Q1H PRN        Allergies   Allergies   Allergen Reactions    Amiodarone      Developed Rash    Penicillins Rash     However, has subsequently tolerated Cefazolin and Cefepime     ---------------------------------------------------------------------------------------  Advance Directive and Living Will:      Power of :    POLST:    ---------------------------------------------------------------------------------------  Care Time Delivered:   No Critical Care time spent     Yuni Hudson PA-C

## 2021-02-15 NOTE — CASE MANAGEMENT
CM reached out to patient's wife Fara Ye and discussed medical status and goals for discharge  Patient's wife hopes for patient to be able to return to [facility] when medically stable  CM explained that with discussions of patient possibly needed a trach/peg if unable to successfully extubate that we would need to confirm that [facility] would still be able to meet the needs of patient  Fara Ye verbalized understanding of this and was appreciative of the update  She confirmed understanding of CM providing updates to her preferred facility  CM department will continue to follow to assist with discharge coordination

## 2021-02-16 LAB
ANION GAP SERPL CALCULATED.3IONS-SCNC: 6 MMOL/L (ref 4–13)
ANION GAP SERPL CALCULATED.3IONS-SCNC: 6 MMOL/L (ref 4–13)
ANION GAP SERPL CALCULATED.3IONS-SCNC: 8 MMOL/L (ref 4–13)
APTT PPP: 90 SECONDS (ref 23–37)
BASOPHILS # BLD AUTO: 0.06 THOUSANDS/ΜL (ref 0–0.1)
BASOPHILS NFR BLD AUTO: 1 % (ref 0–1)
BUN SERPL-MCNC: 44 MG/DL (ref 5–25)
BUN SERPL-MCNC: 49 MG/DL (ref 5–25)
BUN SERPL-MCNC: 53 MG/DL (ref 5–25)
CA-I BLD-SCNC: 1.17 MMOL/L (ref 1.12–1.32)
CALCIUM SERPL-MCNC: 8.3 MG/DL (ref 8.3–10.1)
CALCIUM SERPL-MCNC: 8.7 MG/DL (ref 8.3–10.1)
CALCIUM SERPL-MCNC: 8.9 MG/DL (ref 8.3–10.1)
CHLORIDE SERPL-SCNC: 111 MMOL/L (ref 100–108)
CHLORIDE SERPL-SCNC: 111 MMOL/L (ref 100–108)
CHLORIDE SERPL-SCNC: 113 MMOL/L (ref 100–108)
CO2 SERPL-SCNC: 30 MMOL/L (ref 21–32)
CO2 SERPL-SCNC: 30 MMOL/L (ref 21–32)
CO2 SERPL-SCNC: 32 MMOL/L (ref 21–32)
CREAT SERPL-MCNC: 1.25 MG/DL (ref 0.6–1.3)
CREAT SERPL-MCNC: 1.27 MG/DL (ref 0.6–1.3)
CREAT SERPL-MCNC: 1.39 MG/DL (ref 0.6–1.3)
EOSINOPHIL # BLD AUTO: 0.86 THOUSAND/ΜL (ref 0–0.61)
EOSINOPHIL NFR BLD AUTO: 8 % (ref 0–6)
ERYTHROCYTE [DISTWIDTH] IN BLOOD BY AUTOMATED COUNT: 26.3 % (ref 11.6–15.1)
GFR SERPL CREATININE-BSD FRML MDRD: 54 ML/MIN/1.73SQ M
GFR SERPL CREATININE-BSD FRML MDRD: 61 ML/MIN/1.73SQ M
GFR SERPL CREATININE-BSD FRML MDRD: 62 ML/MIN/1.73SQ M
GLUCOSE SERPL-MCNC: 100 MG/DL (ref 65–140)
GLUCOSE SERPL-MCNC: 100 MG/DL (ref 65–140)
GLUCOSE SERPL-MCNC: 101 MG/DL (ref 65–140)
GLUCOSE SERPL-MCNC: 104 MG/DL (ref 65–140)
GLUCOSE SERPL-MCNC: 105 MG/DL (ref 65–140)
GLUCOSE SERPL-MCNC: 119 MG/DL (ref 65–140)
GLUCOSE SERPL-MCNC: 91 MG/DL (ref 65–140)
HCT VFR BLD AUTO: 33.2 % (ref 36.5–49.3)
HGB BLD-MCNC: 9.2 G/DL (ref 12–17)
IMM GRANULOCYTES # BLD AUTO: 0.25 THOUSAND/UL (ref 0–0.2)
IMM GRANULOCYTES NFR BLD AUTO: 2 % (ref 0–2)
LYMPHOCYTES # BLD AUTO: 0.61 THOUSANDS/ΜL (ref 0.6–4.47)
LYMPHOCYTES NFR BLD AUTO: 6 % (ref 14–44)
MAGNESIUM SERPL-MCNC: 2.1 MG/DL (ref 1.6–2.6)
MCH RBC QN AUTO: 24.1 PG (ref 26.8–34.3)
MCHC RBC AUTO-ENTMCNC: 27.7 G/DL (ref 31.4–37.4)
MCV RBC AUTO: 87 FL (ref 82–98)
MONOCYTES # BLD AUTO: 0.84 THOUSAND/ΜL (ref 0.17–1.22)
MONOCYTES NFR BLD AUTO: 8 % (ref 4–12)
NEUTROPHILS # BLD AUTO: 7.98 THOUSANDS/ΜL (ref 1.85–7.62)
NEUTS SEG NFR BLD AUTO: 75 % (ref 43–75)
NRBC BLD AUTO-RTO: 0 /100 WBCS
PHOSPHATE SERPL-MCNC: 3.4 MG/DL (ref 2.3–4.1)
PLATELET # BLD AUTO: 167 THOUSANDS/UL (ref 149–390)
PMV BLD AUTO: 11.1 FL (ref 8.9–12.7)
POTASSIUM SERPL-SCNC: 3 MMOL/L (ref 3.5–5.3)
POTASSIUM SERPL-SCNC: 3.1 MMOL/L (ref 3.5–5.3)
POTASSIUM SERPL-SCNC: 3.1 MMOL/L (ref 3.5–5.3)
RBC # BLD AUTO: 3.82 MILLION/UL (ref 3.88–5.62)
SODIUM SERPL-SCNC: 147 MMOL/L (ref 136–145)
SODIUM SERPL-SCNC: 149 MMOL/L (ref 136–145)
SODIUM SERPL-SCNC: 151 MMOL/L (ref 136–145)
TROPONIN I SERPL-MCNC: <0.02 NG/ML
WBC # BLD AUTO: 10.6 THOUSAND/UL (ref 4.31–10.16)

## 2021-02-16 PROCEDURE — 94003 VENT MGMT INPAT SUBQ DAY: CPT

## 2021-02-16 PROCEDURE — 82948 REAGENT STRIP/BLOOD GLUCOSE: CPT

## 2021-02-16 PROCEDURE — 92610 EVALUATE SWALLOWING FUNCTION: CPT

## 2021-02-16 PROCEDURE — 85730 THROMBOPLASTIN TIME PARTIAL: CPT | Performed by: NURSE PRACTITIONER

## 2021-02-16 PROCEDURE — 80048 BASIC METABOLIC PNL TOTAL CA: CPT | Performed by: FAMILY MEDICINE

## 2021-02-16 PROCEDURE — 85025 COMPLETE CBC W/AUTO DIFF WBC: CPT | Performed by: NURSE PRACTITIONER

## 2021-02-16 PROCEDURE — 84100 ASSAY OF PHOSPHORUS: CPT | Performed by: NURSE PRACTITIONER

## 2021-02-16 PROCEDURE — 93005 ELECTROCARDIOGRAM TRACING: CPT

## 2021-02-16 PROCEDURE — 94150 VITAL CAPACITY TEST: CPT

## 2021-02-16 PROCEDURE — 83735 ASSAY OF MAGNESIUM: CPT | Performed by: NURSE PRACTITIONER

## 2021-02-16 PROCEDURE — 82330 ASSAY OF CALCIUM: CPT | Performed by: NURSE PRACTITIONER

## 2021-02-16 PROCEDURE — 94760 N-INVAS EAR/PLS OXIMETRY 1: CPT

## 2021-02-16 PROCEDURE — 99291 CRITICAL CARE FIRST HOUR: CPT | Performed by: INTERNAL MEDICINE

## 2021-02-16 PROCEDURE — 84484 ASSAY OF TROPONIN QUANT: CPT | Performed by: FAMILY MEDICINE

## 2021-02-16 RX ORDER — POTASSIUM CHLORIDE 14.9 MG/ML
20 INJECTION INTRAVENOUS
Status: COMPLETED | OUTPATIENT
Start: 2021-02-16 | End: 2021-02-17

## 2021-02-16 RX ORDER — DEXTROSE MONOHYDRATE 50 MG/ML
75 INJECTION, SOLUTION INTRAVENOUS CONTINUOUS
Status: DISCONTINUED | OUTPATIENT
Start: 2021-02-16 | End: 2021-02-17

## 2021-02-16 RX ORDER — POTASSIUM CHLORIDE 20MEQ/15ML
40 LIQUID (ML) ORAL ONCE
Status: COMPLETED | OUTPATIENT
Start: 2021-02-16 | End: 2021-02-16

## 2021-02-16 RX ORDER — POTASSIUM CHLORIDE 14.9 MG/ML
20 INJECTION INTRAVENOUS
Status: COMPLETED | OUTPATIENT
Start: 2021-02-16 | End: 2021-02-16

## 2021-02-16 RX ORDER — LORAZEPAM 2 MG/ML
0.5 INJECTION INTRAMUSCULAR ONCE
Status: COMPLETED | OUTPATIENT
Start: 2021-02-16 | End: 2021-02-16

## 2021-02-16 RX ORDER — MAGNESIUM SULFATE HEPTAHYDRATE 40 MG/ML
2 INJECTION, SOLUTION INTRAVENOUS ONCE
Status: COMPLETED | OUTPATIENT
Start: 2021-02-16 | End: 2021-02-16

## 2021-02-16 RX ADMIN — MAGNESIUM SULFATE HEPTAHYDRATE 2 G: 40 INJECTION, SOLUTION INTRAVENOUS at 11:52

## 2021-02-16 RX ADMIN — Medication 20 MG: at 14:50

## 2021-02-16 RX ADMIN — LEVETIRACETAM 750 MG: 100 INJECTION, SOLUTION INTRAVENOUS at 21:15

## 2021-02-16 RX ADMIN — MIDODRINE HYDROCHLORIDE 10 MG: 5 TABLET ORAL at 07:20

## 2021-02-16 RX ADMIN — Medication 1 TABLET: at 09:12

## 2021-02-16 RX ADMIN — POTASSIUM CHLORIDE 40 MEQ: 20 SOLUTION ORAL at 00:27

## 2021-02-16 RX ADMIN — POTASSIUM CHLORIDE 20 MEQ: 14.9 INJECTION, SOLUTION INTRAVENOUS at 12:36

## 2021-02-16 RX ADMIN — POTASSIUM CHLORIDE 20 MEQ: 14.9 INJECTION, SOLUTION INTRAVENOUS at 17:53

## 2021-02-16 RX ADMIN — ASPIRIN 81 MG CHEWABLE TABLET 81 MG: 81 TABLET CHEWABLE at 09:00

## 2021-02-16 RX ADMIN — Medication 12.5 MG: at 08:59

## 2021-02-16 RX ADMIN — Medication 12.5 MG: at 20:02

## 2021-02-16 RX ADMIN — ERTAPENEM SODIUM 1000 MG: 1 INJECTION, POWDER, LYOPHILIZED, FOR SOLUTION INTRAMUSCULAR; INTRAVENOUS at 09:06

## 2021-02-16 RX ADMIN — DESMOPRESSIN ACETATE 40 MG: 0.2 TABLET ORAL at 21:21

## 2021-02-16 RX ADMIN — POTASSIUM CHLORIDE 40 MEQ: 20 SOLUTION ORAL at 04:06

## 2021-02-16 RX ADMIN — SERTRALINE 75 MG: 25 TABLET, FILM COATED ORAL at 08:59

## 2021-02-16 RX ADMIN — CHLORHEXIDINE GLUCONATE 0.12% ORAL RINSE 15 ML: 1.2 LIQUID ORAL at 08:59

## 2021-02-16 RX ADMIN — Medication 2000 UNITS: at 09:00

## 2021-02-16 RX ADMIN — HEPARIN SODIUM 14 UNITS/KG/HR: 10000 INJECTION, SOLUTION INTRAVENOUS at 10:59

## 2021-02-16 RX ADMIN — FUROSEMIDE 40 MG: 10 INJECTION, SOLUTION INTRAVENOUS at 15:00

## 2021-02-16 RX ADMIN — POTASSIUM CHLORIDE 20 MEQ: 14.9 INJECTION, SOLUTION INTRAVENOUS at 07:00

## 2021-02-16 RX ADMIN — POTASSIUM CHLORIDE 20 MEQ: 14.9 INJECTION, SOLUTION INTRAVENOUS at 14:15

## 2021-02-16 RX ADMIN — Medication 6 MG: at 21:21

## 2021-02-16 RX ADMIN — MIDODRINE HYDROCHLORIDE 10 MG: 5 TABLET ORAL at 14:50

## 2021-02-16 RX ADMIN — SERTRALINE HYDROCHLORIDE 50 MG: 50 TABLET ORAL at 21:21

## 2021-02-16 RX ADMIN — Medication 20 MG: at 03:13

## 2021-02-16 RX ADMIN — POTASSIUM CHLORIDE 20 MEQ: 14.9 INJECTION, SOLUTION INTRAVENOUS at 04:06

## 2021-02-16 RX ADMIN — DEXTROSE MONOHYDRATE 75 ML/HR: 50 INJECTION, SOLUTION INTRAVENOUS at 10:56

## 2021-02-16 RX ADMIN — FUROSEMIDE 40 MG: 10 INJECTION, SOLUTION INTRAVENOUS at 07:23

## 2021-02-16 RX ADMIN — POTASSIUM CHLORIDE 20 MEQ: 14.9 INJECTION, SOLUTION INTRAVENOUS at 16:15

## 2021-02-16 RX ADMIN — LORAZEPAM 0.5 MG: 2 INJECTION INTRAMUSCULAR; INTRAVENOUS at 10:20

## 2021-02-16 RX ADMIN — MIDODRINE HYDROCHLORIDE 10 MG: 5 TABLET ORAL at 22:02

## 2021-02-16 RX ADMIN — LEVETIRACETAM 750 MG: 100 SOLUTION ORAL at 08:59

## 2021-02-16 NOTE — SPEECH THERAPY NOTE
Speech-Language Pathology Bedside Swallow Evaluation        Patient Name: Janell Gupta    GGTID'T Date: 2/16/2021     Problem List  Principal Problem:    COVID-19  Active Problems:    Essential hypertension    Hyperlipidemia    Acute metabolic encephalopathy    Hyponatremia    Dysphagia    Morbid obesity     Depression    Atrial fibrillation (HCC)    Acute respiratory failure with hypoxia and hypercarbia (HCC)    Acute kidney injury superimposed on CKD (HCC)    Anemia    Hypernatremia    Seizure (HCC)    History of stroke    Pneumonia due to Escherichia coli [facility])         Past Medical History  Past Medical History:   Diagnosis Date    Allergic rhinitis     last assessed 9/12/12    Finger fracture, right     Closed fx of the middle phalanx of the right 5th finger  last assessed 1/30/14    GI bleed 2/22/2019    Hemorrhoids     last assessed 2/10/14    Hyperlipidemia     Hypertension     Stroke [facility])        Past Surgical History  Past Surgical History:   Procedure Laterality Date    AORTIC VALVE REPLACEMENT  07/30/2014    AVR with 25mm [facility] TIMOTEOT[facility] Ease bovine pericardial valve    CARDIAC CATHETERIZATION  07/18/2014    SLB left main-normal, circumflex-normal, ramus intermedius-normal, RCA was large and dominant giving rise to the PDA & a large posterolateral branch  No disease  last assessed 8/19/14     COLONOSCOPY N/A 2/25/2019    Procedure: COLONOSCOPY;  Surgeon: Von Horne DO;  Location: [facility]; Service: Gastroenterology    ESOPHAGOGASTRODUODENOSCOPY N/A 2/22/2019    Procedure: ESOPHAGOGASTRODUODENOSCOPY (EGD)-roadshow overnight;  Surgeon: Von Horne DO;  [facility]: [facility]; Service: Gastroenterology    ESOPHAGOGASTRODUODENOSCOPY N/A 2/23/2019    Procedure: ESOPHAGOGASTRODUODENOSCOPY (EGD); Surgeon: Sonia Go MD;  [facility]; Service: Gastroenterology    ESOPHAGOGASTRODUODENOSCOPY N/A 2/25/2019    Procedure: ESOPHAGOGASTRODUODENOSCOPY (EGD);   Surgeon: Brendan Aguilar DO;  [facility]; Service: Gastroenterology    IR NON-TUNNELED CENTRAL LINE PLACEMENT  3/1/2019    IR NON-TUNNELED CENTRAL LINE PLACEMENT  2/4/2019    IR TUNNELED DIALYSIS CATHETER CHECK/CHANGE/REPOSITION/ANGIOPLASTY  4/18/2019    IR TUNNELED DIALYSIS CATHETER PLACEMENT  2/4/2019    IR TUNNELED DIALYSIS CATHETER PLACEMENT  2/18/2019    KNEE ARTHROSCOPY      KNEE CARTILAGE SURGERY Left     medial meniscus tear     TESTICLE SURGERY      TONSILLECTOMY AND ADENOIDECTOMY      TOOTH EXTRACTION         Summary    Pt presents with at least Mild-moderate oropharyngeal dysphagia, swallowing appeared close to baseline  Soft/solids were not assessed; no overt s/s aspiration w/ puree and thick liquids  Recommendations:   Diet: puree/level 1 diet and nectar thick liquids   Meds: crushed with puree -micha pudding  Frequent Oral care: 2x/day  Aspiration precautions and compensatory swallowing strategies: upright posture, only feed when fully alert, slow rate of feeding and small bites/sips  Other Recommendations/ considerations: will cont to follow for diet tolerance and re-assess for potential to advance to baseline dysphagia 2 w/ ntl       Current Medical Status  Pt is a 63 y o  male who presented to [address] [place] [address]  with COVID-19  Please see initial eval 1/26 for further background info  Pt was on dysphagia 1 puree w/ NTL prior to intubation due to fatigue while eating and declining respiratory status while eating  Pt was intubated 2/1/21 and now extubated this am 2/16  Past medical history:   Please see H&P for details    Special Studies:  CXR: 2/14/21  Stable extensive opacities throughout both lungs and hypoventilatory changes      Social/Education/Vocational Hx:  [facility]     Swallow Information   Current Risks for Dysphagia & Aspiration: known history of dysphagia, known history of aspiration, recent intubation and prolonged intubation  Current Symptoms/Concerns: hx of dysphagia  Current Diet: NPO   Baseline Diet: puree/level 1 diet, mechanically altered/level 2 diet and nectar thick liquids-baseline at [facility] is [facility] soft w/ NT  Takes pills-crushed in pudding    Baseline Assessment   Behavior/Cognition: alert  Speech/Language Status: able to follow commands inconsistently and limited verbal output- difficult to understand due to mostly aphonic; pt able to produce some voicing later in session  Patient Positioning: upright in bed     Swallow Mechanism Exam   Facial: symmetrical  Labial: WFL  Lingual: WFL  Velum: unable to visualize  Mandible: adequate ROM  Dentition: edentulous  Vocal quality:aphonic and dysphonic   Volitional Cough: weak and decreased effort   Respiratory: NC    Consistencies Assessed and Performance   Consistencies Administered: nectar thick, honey thick and puree    Oral Stage: pt fed tsps of puree and HTL; also HTL & NTL by cup and straw  Pt w/ functional oral processing/transfer  No oral residue noted  Mild labial leakage noted w/ HTL- pt doesn't like cran juice, so decreased retrieval from cup  Pharyngeal Stage: swallows were timely and complete w/ no overt s/s aspiration  O2 sats maintained at 94%  Unable to assess vocal quality due to aphonia         Esophageal Concerns: none reported      Results Reviewed with: patient, RN and MD     Catalina Albarado [facility]-SLP  Speech Pathologist  [place] license # [id number]  [place] license # [id number]  Available via [employer]

## 2021-02-16 NOTE — CASE MANAGEMENT
MARCIA received a voicemail from Cassi Nj at [facility] [phone] requesting a call back  CM called Cassi Nj back and reviewed with her that patient was extubated today  Facility mentioned that they have concerns about the statements he was making due to depression  Upon review this was back 1/22/21 and he was evaluated by psychiatry  Cassi Nj reported that they just want to make sure patient is safe when he discharges and to ensure that he does not need to be optioned  CM explained that unless he brings up these feelings again he may not be re-evaluated but CM would bring it to the attention of the critical care team   Cassi Nj verbalized understanding of this  CM department will continue to follow to assist with discharge coordination

## 2021-02-16 NOTE — UTILIZATION REVIEW
Continued Stay Review    Date: 02/16/2021                         Current Patient Class: Inpatient  Current Level of Care: Critical Care    HPI:60 y o  male initially admitted on 01/17/2021  Acute hypoxia respiratory failure due to prior COVID 19 pneumonia  Assessment/Plan: Remains vented  OG tube, Rectal tube and bo remain in place  Given an extra dose of IV lasix today, Continue regular IV lasix      VTE Pharmacologic Prophylaxis: Heparin Drip  VTE Mechanical Prophylaxis: sequential compression device  Pertinent Labs/Diagnostic Results:     Results from last 7 days   Lab Units 02/16/21  0317 02/15/21  0422 02/14/21 0459 02/13/21  0438 02/11/21  0533   WBC Thousand/uL 10 60* 9 54 9 58 8 23 9 24   HEMOGLOBIN g/dL 9 2* 9 0* 8 9* 9 3* 9 0*   HEMATOCRIT % 33 2* 32 4* 31 8* 33 8* 32 8*   PLATELETS Thousands/uL 167 161 171 193 187   NEUTROS ABS Thousands/µL 7 98* 7 04  --   --   --    BANDS PCT %  --   --  1 2 3     Results from last 7 days   Lab Units 02/16/21  1148 02/16/21  0317 02/15/21  1949 02/15/21  1257 02/15/21  0422  02/14/21  0459 02/13/21  0438 02/12/21  0433 02/11/21  0533  02/10/21  0347   SODIUM mmol/L 151* 149* 148* 151* 147*   < > 146* 149* 146* 145   < > 144   POTASSIUM mmol/L 3 1* 3 1* 3 2* 3 8 2 7*   < > 3 0* 3 3* 3 3* 3 4*   < > 3 1*   CHLORIDE mmol/L 113* 111* 110* 111* 109*   < > 108 109* 107 106   < > 105   CO2 mmol/L 30 32 32 31 32   < > 32 32 32 32   < > 34*   ANION GAP mmol/L 8 6 6 9 6   < > 6 8 7 7   < > 5   BUN mg/dL 49* 53* 55* 53* 54*   < > 60* 64* 73* 70*   < > 72*   CREATININE mg/dL 1 27 1 39* 1 40* 1 24 1 28   < > 1 40* 1 39* 1 44* 1 57*   < > 1 62*   EGFR ml/min/1 73sq m 61 54 54 62 60   < > 54 54 52 47   < > 45   CALCIUM mg/dL 8 7 8 9 8 9 8 9 8 7   < > 8 7 8 5 8 6 8 5   < > 8 3   CALCIUM, IONIZED mmol/L  --  1 17  --   --   --   --   --   --   --   --   --   --    MAGNESIUM mg/dL  --  2 1  --   --   --   --  2 7* 3 0* 3 2* 3 0*   < > 2 5   PHOSPHORUS mg/dL  --  3 4  --   -- 3 9  --   --   --   --   --   --  4 5*    < > = values in this interval not displayed       Results from last 7 days   Lab Units 02/16/21  1203 02/16/21  0531 02/16/21  0026 02/15/21  1132 02/15/21  0617 02/15/21  0001 02/14/21  1827 02/14/21  1133 02/14/21  0559 02/13/21  2341 02/13/21  1758 02/13/21  1214   POC GLUCOSE mg/dl 100 100 119 95 90 91 96 128 105 113 146* 136     Results from last 7 days   Lab Units 02/16/21  1148 02/16/21  0317 02/15/21  1949 02/15/21  1257 02/15/21  0422 02/14/21  1747 02/14/21  0459 02/13/21  0438 02/12/21  0433 02/11/21  0533 02/10/21  1930 02/10/21  0347   GLUCOSE RANDOM mg/dL 105 104 126 100 81 106 134 124 134 126 116 132     Results from last 7 days   Lab Units 02/16/21  1317   TROPONIN I ng/mL <0 02     Results from last 7 days   Lab Units 02/16/21  0317 02/15/21  0422 02/14/21  0459   PTT seconds 90* 68* 72*     Results from last 7 days   Lab Units 02/14/21  0459 02/13/21  0438 02/11/21  0533 02/10/21  0347   TOTAL COUNTED  100 100 100 100     Vital Signs: BP 93/62   Pulse (!) 110   Temp 98 4 °F (36 9 °C)   Resp (!) 29   Ht 5' 11" (1 803 m)   Wt (!) 165 kg (363 lb 12 1 oz)   SpO2 92%   BMI 50 73 kg/m²       Medications:   Scheduled Medications:  aspirin, 81 mg, Oral, Daily  atorvastatin, 40 mg, Per NG Tube, HS  cholecalciferol, 2,000 Units, Per NG Tube, Daily  furosemide, 40 mg, Intravenous, BID (diuretic)  insulin lispro, 1-6 Units, Subcutaneous, Q6H ARACELIS  levETIRAcetam, 750 mg, Intravenous, Q12H [address]  melatonin, 6 mg, Oral, HS  metoprolol tartrate, 12 5 mg, Oral, Q12H ARACELIS  midodrine, 10 mg, Oral, Q8H  multivitamin-minerals, 1 tablet, Oral, Daily  omeprazole (PRILOSEC) suspension 2 mg/mL, 20 mg, Oral, Q12H  polyethylene glycol, 17 g, Oral, BID  potassium chloride, 20 mEq, Intravenous, Q2H  senna-docusate sodium, 1 tablet, Per NG Tube, BID  sertraline, 50 mg, Oral, HS  sertraline, 75 mg, Oral, Daily      Continuous IV Infusions:  dextrose, 75 mL/hr, Intravenous, Continuous  heparin (porcine), 3-30 Units/kg/hr (Order-Specific), Intravenous, Titrated      PRN Meds:  acetaminophen, 650 mg, Per NG Tube, Q4H PRN  bisacodyl, 10 mg, Rectal, Daily PRN  heparin (porcine), 10,000 Units, Intravenous, Q1H PRN  heparin (porcine), 5,000 Units, Intravenous, Q1H PRN        Discharge Plan: [facility]    Network Utilization Review Department  ATTENTION: Please call with any questions or concerns to [phone] and carefully listen to the prompts so that you are directed to the right person  All voicemails are confidential   Brooklyn Michael all requests for admission clinical reviews, approved or denied determinations and any other requests to dedicated fax number below belonging to the campus where the patient is receiving treatment   List of dedicated fax numbers for the Facilities:  [address] DENIALS (Administrative/Medical Necessity) [phone]   [address] (Maternity/NICU/Pediatrics) [phone]   [address] [place] [address] Dr Wally Sykes [phone] (  Jero Jim "Mila" 103) [phone] [place] [address] Radha Gallagher 1481 [address] [place] [address] [phone]

## 2021-02-16 NOTE — PLAN OF CARE
Problem: Potential for Falls  Goal: Patient will remain free of falls  Description: INTERVENTIONS:  - Assess patient frequently for physical needs  -  Identify cognitive and physical deficits and behaviors that affect risk of falls  -  [place] fall precautions as indicated by assessment   - Educate patient/family on patient safety including physical limitations  - Instruct patient to call for assistance with activity based on assessment  - Modify environment to reduce risk of injury  - Consider OT/PT consult to assist with strengthening/mobility  Outcome: Progressing     Problem: SAFETY,RESTRAINT: NV/NON-SELF DESTRUCTIVE BEHAVIOR  Goal: Remains free of harm/injury (restraint for non violent/non self-detsructive behavior)  Description: INTERVENTIONS:  - Instruct patient/family regarding restraint use   - Assess and monitor physiologic and psychological status   - Provide interventions and comfort measures to meet assessed patient needs   - Identify and implement measures to help patient regain control  - Assess readiness for release of restraint   Outcome: Progressing  Goal: Returns to optimal restraint-free functioning  Description: INTERVENTIONS:  - Assess the patient's behavior and symptoms that indicate continued need for restraint  - Identify and implement measures to help patient regain control  - Assess readiness for release of restraint   Outcome: Progressing     Problem: Nutrition/Hydration-ADULT  Goal: Nutrient/Hydration intake appropriate for improving, restoring or maintaining nutritional needs  Description: Monitor and assess patient's nutrition/hydration status for malnutrition  Collaborate with interdisciplinary team and initiate plan and interventions as ordered  Monitor patient's weight and dietary intake as ordered or per policy  Utilize nutrition screening tool and intervene as necessary  Determine patient's food preferences and provide high-protein, high-caloric foods as appropriate  INTERVENTIONS:  - Monitor oral intake, urinary output, labs, and treatment plans  - Assess nutrition and hydration status and recommend course of action  - Evaluate amount of meals eaten  - Assist patient with eating if necessary   - Allow adequate time for meals  - Recommend/ encourage appropriate diets, oral nutritional supplements, and vitamin/mineral supplements  - Order, calculate, and assess calorie counts as needed  - Recommend, monitor, and adjust tube feedings and TPN/PPN based on assessed needs  - Assess need for intravenous fluids  - Provide specific nutrition/hydration education as appropriate  - Include patient/family/caregiver in decisions related to nutrition  Outcome: Progressing

## 2021-02-16 NOTE — PLAN OF CARE
Ok to begin dysphagia 1 puree w/ nectar thick liquids by single cup sips or tsp     Meds crushed in micha pudding-doesn't like applesauce

## 2021-02-16 NOTE — PROGRESS NOTES
Daily Progress Note - Critical Care   Marcellus Bloom 63 y o  male MRN: [id number]  Unit/Bed#: ICU 08 Encounter: [id number]    ----------------------------------------------------------------------------------------  HPI/24hr events: Overnight he was given an extra dose of 40mg IV lasix and his potassium of 3 1 was repleted with 60mEq total of potassium chloride  VS: HR 90-105bpm, RR 20's, sats low 90s on PS 6/6 40%, Maps 80's, and afebrile     vent day 16, has bo, rectal tube, and OG in place ---------------------------------------------------------------------------------------  SUBJECTIVE  Intubated, unable to offer verbal complaints at this time  Continues to follow simple commands  Denies pain or current needs  Review of Systems   Unable to perform ROS: Intubated   Cardiovascular: Positive for leg swelling  Negative for chest pain     Gastrointestinal: Negative for abdominal pain      ---------------------------------------------------------------------------------------  Assessment and Plan:    Neuro:    Acute Metabolic Encephalopathy  o Plan:   - 2/2 hypoxia/hypercapnia and h/o CVA  - delirium precautions, regulate sleep/wake cycles  - CAM ICU daily  - Off sedation, continue fentanyl @25 for agitation  - Melatonin hs and Seroquel hs   Seizure 2/1  o EEG 2/2/21 Background activities are too slow suggesting moderate diffuse cerebral dysfunction of nonspecific etiology  o Neuro - no intervention at this time  o Plan:   - Continue Keppra 750 BID - wean once extubated   Hx of CVA  o approx 1 year ago  o Baseline resident at [facility]  o Plan:  - Hepatin gtt  - Theo Mclean daily   Depression  o Plan:  - Continue zoloft    CV:    Atrial fibrillation  o Not on AC at home 2/2 h/o GI bleed  o Plan:   - Heparin gtt  - Metoprolol 12 5 BID  - Tele monitoring   Cardiomyopathy  o Echo 7/2020: EF 50%, LBBB  o Plan:   - Midodrine TID  - Hold home bumex  - IV lasix 40mg BID   Essential HTN  o Plan:  - Metoprolol 12 5mg BID  - Hold home cardizem   Hyperlipidemia  o Plan:  - Continue statin    Pulm:   Acute hypoxic respiratory failure   o 21 covid-19 with superimposed ESBL e   Coli PNA  o previously intubated 1/1-1/18  o reintubated 2/1  o Vent day 16 - PS 6/6 40%  o Off decadron 2/15  o Plan:   - Titrate FiO2 to maintain spo2 >88%  - Possible extubation to Bipap today  - If fails extubation, consider trach/peg   PNA due to ESBL E  Coli  o Plan:   - Ertapenem day 9/10    GI:    Dysphagia at baseline  o OG tube placed 2/14  o Plan:  - Formal swallow eval once extubated  - Continue OG tube feeds  - GI ppx: Omeprazole   Diarrhea  o Rectal tube in place -1900cc  o Plan:  - Bowel regimen on hold    :    PATRICK superimposed on CKD3 - RESOLVED  o Cr today 1 39  o Plan:   - Continue to trend BUN/Cr  - Strict I/O  - Avoid nephrotoxic meds    F/E/N:    Plan:   - Fluids: none at this time  - Electrolytes:   Hypernatremia: continue  q4h   Hypokalemia: continue to monitor with BMP q8h and replete as necessary  - Nutrition: continue tube feeds    Heme/Onc:    Anemia  o Plan:   - Continue to monitor AM CBC  - Transfuse as needed for Hgb <7  - No active source of bleeding    Endo:    Morbid Obesity  o Plan:   - Continue to monitor blood glucose goal 140-180    ID:    Covid-19 PNA with superimposed ESBL E  Coli PNA  o >20 days since covid diagnosis - off precautions  o Completed Covid severe pathway management  o Off decadron 2/15  o Actemra 1/28  o Plasma 1/17  o Plan:   - continue Ertapenem day 9/10    MSK/Skin:    Bilateral LE edema  o Plan:   - Elevate LE  - Continue lasix 40 BID  - PT/OT when able  - Local wound care as needed    Disposition: Continue Critical Care   Code Status: Level 1 - Full Code  ---------------------------------------------------------------------------------------  ICU CORE MEASURES    Prophylaxis   VTE Pharmacologic Prophylaxis: Heparin Drip  VTE Mechanical Prophylaxis: sequential compression device  Stress Ulcer Prophylaxis: Omeprazole PO    ABCDE Protocol (if indicated)  Plan to perform spontaneous awakening trial today? Not applicable  Plan to perform spontaneous breathing trial today? Yes  Obvious barriers to extubation? No  CAM-ICU: Negative    Invasive Devices Review  Invasive Devices     Peripheral Intravenous Line            Long-Dwell Peripheral IV (Midline)  Left Cephalic 21 days    Peripheral IV 02/15/21 Right Forearm less than 1 day          Drain            Urethral Catheter Straight-tip 16 Fr  29 days    NG/OG/Enteral Tube Orogastric 14 days    Rectal Tube With balloon 3 days          Airway            ETT  Cuffed; Hi-Lo 8 mm 14 days              Can any invasive devices be discontinued today? Yes  ---------------------------------------------------------------------------------------  OBJECTIVE    Vitals   Vitals:    21 0424 21 0500 21 0541 21 0600   BP:  116/64     Pulse:  104  105   Resp:  (!) 34  (!) 24   Temp:  98 4 °F (36 9 °C)  98 4 °F (36 9 °C)   TempSrc:       SpO2: 91% 93%  93%   Weight:   (!) 165 kg (363 lb 12 1 oz)    Height:         Temp (24hrs), Av 2 °F (36 8 °C), Min:97 3 °F (36 3 °C), Max:98 6 °F (37 °C)  Current: Temperature: 98 4 °F (36 9 °C)  HR: 112  BP: 100/74  RR: 24  SpO2: 95    Respiratory:  SpO2: SpO2: 93 %, SpO2 Activity: SpO2 Activity: At Rest, SpO2 Device: O2 Device: Ventilator  Nasal Cannula O2 Flow Rate (L/min): 50 L/min    Invasive/non-invasive ventilation settings   Respiratory    Lab Data (Last 4 hours)    None         O2/Vent Data (Last 4 hours)    None                Physical Exam  Constitutional:       General: He is not in acute distress  Appearance: He is obese  He is not ill-appearing  Comments: Intubated, OG tube in, Rectal tube and bo in place   HENT:      Head: Normocephalic and atraumatic        Nose: Nose normal       Mouth/Throat:      Mouth: Mucous membranes are moist  Pharynx: Oropharynx is clear  Eyes:      Extraocular Movements: Extraocular movements intact  Conjunctiva/sclera: Conjunctivae normal       Pupils: Pupils are equal, round, and reactive to light  Neck:      Musculoskeletal: Neck supple  No neck rigidity  Cardiovascular:      Rate and Rhythm: Regular rhythm  Tachycardia present  Comments: Weak pulses and distant heart sounds  Pulmonary:      Effort: No respiratory distress  Breath sounds: Wheezing present  No rhonchi  Comments: Intubated on pressure support 6/6 40%  Abdominal:      General: Abdomen is flat  Bowel sounds are normal  There is no distension  Palpations: Abdomen is soft  Tenderness: There is no abdominal tenderness  Musculoskeletal:      Right lower leg: Edema present  Left lower leg: Edema present  Skin:     General: Skin is warm and dry  Capillary Refill: Capillary refill takes less than 2 seconds  Coloration: Skin is not pale  Neurological:      Mental Status: He is alert  Mental status is at baseline     Psychiatric:         Mood and Affect: Mood normal          Behavior: Behavior normal          Laboratory and Diagnostics:  Results from last 7 days   Lab Units 02/16/21  0317 02/15/21  0422 02/14/21  0459 02/13/21  0438 02/11/21  0533 02/10/21  0347   WBC Thousand/uL 10 60* 9 54 9 58 8 23 9 24 9 85   HEMOGLOBIN g/dL 9 2* 9 0* 8 9* 9 3* 9 0* 9 6*   HEMATOCRIT % 33 2* 32 4* 31 8* 33 8* 32 8* 33 5*   PLATELETS Thousands/uL 167 161 171 193 187 201   NEUTROS PCT % 75 74  --   --   --   --    BANDS PCT %  --   --  1 2 3 4   MONOS PCT % 8 7  --   --   --   --    MONO PCT %  --   --  7 7 5 10     Results from last 7 days   Lab Units 02/16/21  0317 02/15/21  1949 02/15/21  1257 02/15/21  0422 02/14/21  1747 02/14/21  0459 02/13/21  0438   SODIUM mmol/L 149* 148* 151* 147* 146* 146* 149*   POTASSIUM mmol/L 3 1* 3 2* 3 8 2 7* 3 2* 3 0* 3 3*   CHLORIDE mmol/L 111* 110* 111* 109* 107 108 109*   CO2 mmol/L 32 32 31 32 33* 32 32   ANION GAP mmol/L 6 6 9 6 6 6 8   BUN mg/dL 53* 55* 53* 54* 58* 60* 64*   CREATININE mg/dL 1 39* 1 40* 1 24 1 28 1 43* 1 40* 1 39*   CALCIUM mg/dL 8 9 8 9 8 9 8 7 8 5 8 7 8 5   GLUCOSE RANDOM mg/dL 104 126 100 81 106 134 124     Results from last 7 days   Lab Units 02/16/21  0317 02/15/21  0422 02/14/21  0459 02/13/21  0438 02/12/21  0433 02/11/21  0533 02/10/21  1930 02/10/21  0347   MAGNESIUM mg/dL 2 1  --  2 7* 3 0* 3 2* 3 0* 2 6 2 5   PHOSPHORUS mg/dL 3 4 3 9  --   --   --   --   --  4 5*      Results from last 7 days   Lab Units 02/16/21  0317 02/15/21  0422 02/14/21  0459 02/13/21  1839 02/13/21  1223 02/13/21  0438 02/12/21  0433   PTT seconds 90* 68* 72* 83* 67* 58* 74*              ABG:  Results from last 7 days   Lab Units 02/09/21  0733   PH ART  7 389   PCO2 ART mm Hg 54 0*   PO2 ART mm Hg 59 9*   HCO3 ART mmol/L 31 9*   BASE EXC ART mmol/L 5 8   ABG SOURCE  Line, Arterial     VBG:  Results from last 7 days   Lab Units 02/09/21  0733   ABG SOURCE  Line, Arterial           Micro        EKG: no new  Imaging: no new I have personally reviewed pertinent reports  Intake and Output  I/O       02/14 0701 - 02/15 0700 02/15 0701 - 02/16 0700    P  O   0    I V  (mL/kg) 457 3 (2 7) 467 8 (2 8)    NG/ 2130    IV Piggyback 50 250    Feedings 450 765    Total Intake(mL/kg) 1747 3 (10 5) 3612 8 (21 9)    Urine (mL/kg/hr) 2600 (0 6) 3200 (0 8)    Emesis/NG output 0     Stool 300 1900    Total Output 2900 5100    Net -1152 8 -1487 2          Unmeasured Stool Occurrence 2 x         Height and Weights   Height: 5' 11" (180 3 cm)  IBW: 75 3 kg  Body mass index is 50 73 kg/m²    Weight (last 2 days)     Date/Time   Weight    02/16/21 0541   (!) 165 (363 76)    02/14/21 0600   (!) 167 (368 17)                Nutrition       Diet Orders   (From admission, onward)             Start     Ordered    02/15/21 1144  Diet Enteral/Parenteral; Tube Feeding No Oral Diet; Jevity 1 5; Continuous; 45; Prosource Protein Liquid - Two Packets; Banatrol Plus Banana Flakes - Two Packets; 300; Water; Every 4 hours  Diet effective now     Question Answer Comment   Diet Type Enteral/Parenteral    Enteral/Parenteral Tube Feeding No Oral Diet    Tube Feeding Formula: Jevity 1 5    Bolus/Cyclic/Continuous Continuous    Tube Feeding Goal Rate (mL/hr): 45    Prosource Protein Liquid - No Carb Prosource Protein Liquid - Two Packets    Banatrol Plus Banana Flakes Banatrol Plus Banana Flakes - Two Packets    Tube Feeding flush (mL): 300    Water Flush type: Water    Water flush frequency: Every 4 hours    RD to adjust diet per protocol?  Yes        02/15/21 1144    01/17/21 1719  Room Service  Once     Question:  Type of Service  Answer:  Room Service- Not Appropriate    01/17/21 1718                Active Medications  Scheduled Meds:  Current Facility-Administered Medications   Medication Dose Route Frequency Provider Last Rate    acetaminophen  650 mg Per NG Tube Q4H PRN Luanne Guerrier PA-C      aspirin  81 mg Oral Daily KATHY Rubio      atorvastatin  40 mg Per NG Tube HS Luanne Guerrier PA-C      bisacodyl  10 mg Rectal Daily PRN Luanne Guerrier PA-C      chlorhexidine  15 mL [facility] [place], [place]      cholecalciferol  2,000 Units Per NG Tube Daily Luanne Guerrier PA-C      ertapenem  1,000 mg Intravenous Q24H KATHY Rubio 1,000 mg (02/15/21 1013)    fentaNYL  12 5 mcg/hr Intravenous Continuous Christinaluke Jasso MD 12 5 mcg/hr (02/15/21 1948)    fentanyl citrate (PF)  50 mcg Intravenous Q1H PRN KATHY Rubio      furosemide  40 mg Intravenous BID (diuretic) Rossy Alexander PA-C      heparin (porcine)  3-30 Units/kg/hr (Order-Specific) Intravenous Titrated Yesenia Albino, DO 14 Units/kg/hr (02/15/21 1745)    heparin (porcine)  10,000 Units Intravenous Q1H PRN Yesenia Albino, DO      heparin (porcine)  5,000 Units Intravenous Q1H PRN Patrick Reilly Graham,       insulin lispro  1-6 Units Subcutaneous Q6H [facility] Ross Gonzalez, KATHY      levETIRAcetam  750 mg Oral Q12H [facility] Ruthila Lisa, KATHY      melatonin  6 mg Oral HS Pamila Lisa, KENIANP      metoprolol tartrate  12 5 mg Oral Q12H [facility] Art Sen[place], KATHY      midodrine  10 mg Oral Q8H Art [place], KATHY      multivitamin-minerals  1 tablet Oral Daily Mariia Manzano,       omeprazole (PRILOSEC) suspension 2 mg/mL  20 mg Oral Q12H Ross Gonzalez, CRNP      polyethylene glycol  17 g Oral BID Jian Wilson PA-C      potassium chloride  20 mEq Intravenous Q2H KATHY Moffett 20 mEq (02/16/21 0406)    senna-docusate sodium  1 tablet Per NG Tube BID Pamila Lisa, KATHY      sertraline  50 mg Oral HS Pamila Lisa, KATHY      sertraline  75 mg Oral Daily KATHY Domingo       Continuous Infusions:  fentaNYL, 12 5 mcg/hr, Last Rate: 12 5 mcg/hr (02/15/21 1948)  heparin (porcine), 3-30 Units/kg/hr (Order-Specific), Last Rate: 14 Units/kg/hr (02/15/21 1745)      PRN Meds:   acetaminophen, 650 mg, Q4H PRN  bisacodyl, 10 mg, Daily PRN  fentanyl citrate (PF), 50 mcg, Q1H PRN  heparin (porcine), 10,000 Units, Q1H PRN  heparin (porcine), 5,000 Units, Q1H PRN        Allergies   Allergies   Allergen Reactions    Amiodarone      Developed Rash    Penicillins Rash     However, has subsequently tolerated Cefazolin and Cefepime     ---------------------------------------------------------------------------------------  Advance Directive and Living Will:      Power of :    POLST:    ---------------------------------------------------------------------------------------  Care Time Delivered:   Upon my evaluation, this patient had a high probability of imminent or life-threatening deterioration due to above, which required my direct attention, intervention, and personal management    I have personally provided 30 minutes (630 to 700) of critical care time, exclusive of procedures, teaching, family meetings, and any prior time recorded by providers other than myself  Tobi Mccormack DO      Portions of the record may have been created with voice recognition software  Occasional wrong word or "sound a like" substitutions may have occurred due to the inherent limitations of voice recognition software    Read the chart carefully and recognize, using context, where substitutions have occurred

## 2021-02-17 PROBLEM — E87.1 HYPONATREMIA: Status: RESOLVED | Noted: 2019-02-19 | Resolved: 2021-02-17

## 2021-02-17 PROBLEM — R19.7 DIARRHEA OF PRESUMED INFECTIOUS ORIGIN: Status: ACTIVE | Noted: 2021-02-17

## 2021-02-17 PROBLEM — U07.1 COVID-19: Status: RESOLVED | Noted: 2021-01-18 | Resolved: 2021-02-17

## 2021-02-17 PROBLEM — R60.0 LOWER EXTREMITY EDEMA: Status: ACTIVE | Noted: 2021-02-17

## 2021-02-17 LAB
ANION GAP SERPL CALCULATED.3IONS-SCNC: 10 MMOL/L (ref 4–13)
ANION GAP SERPL CALCULATED.3IONS-SCNC: 7 MMOL/L (ref 4–13)
ANION GAP SERPL CALCULATED.3IONS-SCNC: 8 MMOL/L (ref 4–13)
ANISOCYTOSIS BLD QL SMEAR: PRESENT
APTT PPP: 122 SECONDS (ref 23–37)
ATRIAL RATE: 86 BPM
BASO STIPL BLD QL SMEAR: PRESENT
BASOPHILS # BLD MANUAL: 0.08 THOUSAND/UL (ref 0–0.1)
BASOPHILS NFR MAR MANUAL: 1 % (ref 0–1)
BUN SERPL-MCNC: 42 MG/DL (ref 5–25)
BUN SERPL-MCNC: 43 MG/DL (ref 5–25)
BUN SERPL-MCNC: 46 MG/DL (ref 5–25)
CALCIUM SERPL-MCNC: 8.6 MG/DL (ref 8.3–10.1)
CALCIUM SERPL-MCNC: 8.8 MG/DL (ref 8.3–10.1)
CALCIUM SERPL-MCNC: 9 MG/DL (ref 8.3–10.1)
CHLORIDE SERPL-SCNC: 109 MMOL/L (ref 100–108)
CHLORIDE SERPL-SCNC: 109 MMOL/L (ref 100–108)
CHLORIDE SERPL-SCNC: 110 MMOL/L (ref 100–108)
CO2 SERPL-SCNC: 29 MMOL/L (ref 21–32)
CO2 SERPL-SCNC: 30 MMOL/L (ref 21–32)
CO2 SERPL-SCNC: 30 MMOL/L (ref 21–32)
CREAT SERPL-MCNC: 1.32 MG/DL (ref 0.6–1.3)
CREAT SERPL-MCNC: 1.33 MG/DL (ref 0.6–1.3)
CREAT SERPL-MCNC: 1.34 MG/DL (ref 0.6–1.3)
EOSINOPHIL # BLD MANUAL: 1.04 THOUSAND/UL (ref 0–0.4)
EOSINOPHIL NFR BLD MANUAL: 13 % (ref 0–6)
ERYTHROCYTE [DISTWIDTH] IN BLOOD BY AUTOMATED COUNT: 26.2 % (ref 11.6–15.1)
GFR SERPL CREATININE-BSD FRML MDRD: 57 ML/MIN/1.73SQ M
GFR SERPL CREATININE-BSD FRML MDRD: 57 ML/MIN/1.73SQ M
GFR SERPL CREATININE-BSD FRML MDRD: 58 ML/MIN/1.73SQ M
GLUCOSE SERPL-MCNC: 101 MG/DL (ref 65–140)
GLUCOSE SERPL-MCNC: 108 MG/DL (ref 65–140)
GLUCOSE SERPL-MCNC: 113 MG/DL (ref 65–140)
GLUCOSE SERPL-MCNC: 117 MG/DL (ref 65–140)
GLUCOSE SERPL-MCNC: 126 MG/DL (ref 65–140)
GLUCOSE SERPL-MCNC: 91 MG/DL (ref 65–140)
GLUCOSE SERPL-MCNC: 92 MG/DL (ref 65–140)
GLUCOSE SERPL-MCNC: 96 MG/DL (ref 65–140)
HCT VFR BLD AUTO: 31.4 % (ref 36.5–49.3)
HGB BLD-MCNC: 8.5 G/DL (ref 12–17)
HYPERCHROMIA BLD QL SMEAR: PRESENT
LYMPHOCYTES # BLD AUTO: 0.32 THOUSAND/UL (ref 0.6–4.47)
LYMPHOCYTES # BLD AUTO: 4 % (ref 14–44)
MAGNESIUM SERPL-MCNC: 2.4 MG/DL (ref 1.6–2.6)
MCH RBC QN AUTO: 23.7 PG (ref 26.8–34.3)
MCHC RBC AUTO-ENTMCNC: 27.1 G/DL (ref 31.4–37.4)
MCV RBC AUTO: 88 FL (ref 82–98)
METAMYELOCYTES NFR BLD MANUAL: 3 % (ref 0–1)
MONOCYTES # BLD AUTO: 0.24 THOUSAND/UL (ref 0–1.22)
MONOCYTES NFR BLD: 3 % (ref 4–12)
NEUTROPHILS # BLD MANUAL: 6.1 THOUSAND/UL (ref 1.85–7.62)
NEUTS BAND NFR BLD MANUAL: 5 % (ref 0–8)
NEUTS SEG NFR BLD AUTO: 71 % (ref 43–75)
NRBC BLD AUTO-RTO: 0 /100 WBCS
P AXIS: 57 DEGREES
PHOSPHATE SERPL-MCNC: 4.4 MG/DL (ref 2.3–4.1)
PLATELET # BLD AUTO: 162 THOUSANDS/UL (ref 149–390)
PLATELET BLD QL SMEAR: ABNORMAL
PMV BLD AUTO: 11.7 FL (ref 8.9–12.7)
POIKILOCYTOSIS BLD QL SMEAR: PRESENT
POLYCHROMASIA BLD QL SMEAR: PRESENT
POTASSIUM SERPL-SCNC: 2.8 MMOL/L (ref 3.5–5.3)
POTASSIUM SERPL-SCNC: 2.9 MMOL/L (ref 3.5–5.3)
POTASSIUM SERPL-SCNC: 3.2 MMOL/L (ref 3.5–5.3)
PR INTERVAL: 228 MS
QRS AXIS: 84 DEGREES
QRSD INTERVAL: 166 MS
QT INTERVAL: 430 MS
QTC INTERVAL: 514 MS
RBC # BLD AUTO: 3.58 MILLION/UL (ref 3.88–5.62)
RBC MORPH BLD: PRESENT
SCHISTOCYTES BLD QL SMEAR: PRESENT
SODIUM SERPL-SCNC: 146 MMOL/L (ref 136–145)
SODIUM SERPL-SCNC: 147 MMOL/L (ref 136–145)
SODIUM SERPL-SCNC: 149 MMOL/L (ref 136–145)
STOMATOCYTES BLD QL SMEAR: PRESENT
T WAVE AXIS: -54 DEGREES
TOTAL CELLS COUNTED SPEC: 100
VENTRICULAR RATE: 86 BPM
WBC # BLD AUTO: 8.02 THOUSAND/UL (ref 4.31–10.16)

## 2021-02-17 PROCEDURE — 99233 SBSQ HOSP IP/OBS HIGH 50: CPT | Performed by: PSYCHIATRY & NEUROLOGY

## 2021-02-17 PROCEDURE — 82948 REAGENT STRIP/BLOOD GLUCOSE: CPT

## 2021-02-17 PROCEDURE — 99232 SBSQ HOSP IP/OBS MODERATE 35: CPT | Performed by: INTERNAL MEDICINE

## 2021-02-17 PROCEDURE — 94760 N-INVAS EAR/PLS OXIMETRY 1: CPT

## 2021-02-17 PROCEDURE — 87493 C DIFF AMPLIFIED PROBE: CPT | Performed by: FAMILY MEDICINE

## 2021-02-17 PROCEDURE — 84100 ASSAY OF PHOSPHORUS: CPT | Performed by: PHYSICIAN ASSISTANT

## 2021-02-17 PROCEDURE — 85730 THROMBOPLASTIN TIME PARTIAL: CPT | Performed by: NURSE PRACTITIONER

## 2021-02-17 PROCEDURE — 83735 ASSAY OF MAGNESIUM: CPT | Performed by: PHYSICIAN ASSISTANT

## 2021-02-17 PROCEDURE — 85027 COMPLETE CBC AUTOMATED: CPT | Performed by: PHYSICIAN ASSISTANT

## 2021-02-17 PROCEDURE — 80048 BASIC METABOLIC PNL TOTAL CA: CPT | Performed by: FAMILY MEDICINE

## 2021-02-17 PROCEDURE — 93010 ELECTROCARDIOGRAM REPORT: CPT | Performed by: INTERNAL MEDICINE

## 2021-02-17 PROCEDURE — 94762 N-INVAS EAR/PLS OXIMTRY CONT: CPT

## 2021-02-17 PROCEDURE — 85007 BL SMEAR W/DIFF WBC COUNT: CPT | Performed by: PHYSICIAN ASSISTANT

## 2021-02-17 PROCEDURE — NC001 PR NO CHARGE: Performed by: INTERNAL MEDICINE

## 2021-02-17 PROCEDURE — 93005 ELECTROCARDIOGRAM TRACING: CPT

## 2021-02-17 RX ORDER — ACETAMINOPHEN 325 MG/1
650 TABLET ORAL EVERY 6 HOURS PRN
Status: DISCONTINUED | OUTPATIENT
Start: 2021-02-17 | End: 2021-03-04 | Stop reason: HOSPADM

## 2021-02-17 RX ORDER — POTASSIUM CHLORIDE 20MEQ/15ML
40 LIQUID (ML) ORAL ONCE
Status: COMPLETED | OUTPATIENT
Start: 2021-02-17 | End: 2021-02-17

## 2021-02-17 RX ORDER — LEVETIRACETAM 500 MG/1
750 TABLET ORAL EVERY 12 HOURS SCHEDULED
Status: DISCONTINUED | OUTPATIENT
Start: 2021-02-17 | End: 2021-03-04 | Stop reason: HOSPADM

## 2021-02-17 RX ORDER — POTASSIUM CHLORIDE 14.9 MG/ML
20 INJECTION INTRAVENOUS
Status: COMPLETED | OUTPATIENT
Start: 2021-02-17 | End: 2021-02-17

## 2021-02-17 RX ORDER — POTASSIUM CHLORIDE 14.9 MG/ML
20 INJECTION INTRAVENOUS
Status: DISCONTINUED | OUTPATIENT
Start: 2021-02-17 | End: 2021-02-17

## 2021-02-17 RX ORDER — POTASSIUM CHLORIDE 29.8 MG/ML
40 INJECTION INTRAVENOUS ONCE
Status: DISCONTINUED | OUTPATIENT
Start: 2021-02-17 | End: 2021-02-17

## 2021-02-17 RX ADMIN — MIDODRINE HYDROCHLORIDE 10 MG: 5 TABLET ORAL at 06:02

## 2021-02-17 RX ADMIN — METOPROLOL TARTRATE 25 MG: 25 TABLET, FILM COATED ORAL at 09:50

## 2021-02-17 RX ADMIN — SERTRALINE HYDROCHLORIDE 50 MG: 50 TABLET ORAL at 22:11

## 2021-02-17 RX ADMIN — FUROSEMIDE 40 MG: 10 INJECTION, SOLUTION INTRAVENOUS at 07:43

## 2021-02-17 RX ADMIN — Medication 20 MG: at 14:18

## 2021-02-17 RX ADMIN — Medication 2000 UNITS: at 09:54

## 2021-02-17 RX ADMIN — LEVETIRACETAM 750 MG: 500 TABLET, FILM COATED ORAL at 09:51

## 2021-02-17 RX ADMIN — SERTRALINE 75 MG: 25 TABLET, FILM COATED ORAL at 09:51

## 2021-02-17 RX ADMIN — HEPARIN SODIUM 14 UNITS/KG/HR: 10000 INJECTION, SOLUTION INTRAVENOUS at 02:56

## 2021-02-17 RX ADMIN — POTASSIUM CHLORIDE 40 MEQ: 20 SOLUTION ORAL at 22:11

## 2021-02-17 RX ADMIN — Medication 6 MG: at 22:11

## 2021-02-17 RX ADMIN — APIXABAN 2.5 MG: 2.5 TABLET, FILM COATED ORAL at 17:15

## 2021-02-17 RX ADMIN — APIXABAN 2.5 MG: 2.5 TABLET, FILM COATED ORAL at 09:54

## 2021-02-17 RX ADMIN — Medication 20 MG: at 03:22

## 2021-02-17 RX ADMIN — LEVETIRACETAM 750 MG: 500 TABLET, FILM COATED ORAL at 22:10

## 2021-02-17 RX ADMIN — ASPIRIN 81 MG CHEWABLE TABLET 81 MG: 81 TABLET CHEWABLE at 09:54

## 2021-02-17 RX ADMIN — Medication 1 TABLET: at 09:51

## 2021-02-17 RX ADMIN — POTASSIUM CHLORIDE 20 MEQ: 14.9 INJECTION, SOLUTION INTRAVENOUS at 07:46

## 2021-02-17 RX ADMIN — DESMOPRESSIN ACETATE 40 MG: 0.2 TABLET ORAL at 22:10

## 2021-02-17 RX ADMIN — MIDODRINE HYDROCHLORIDE 10 MG: 5 TABLET ORAL at 22:11

## 2021-02-17 RX ADMIN — DEXTROSE MONOHYDRATE 75 ML/HR: 50 INJECTION, SOLUTION INTRAVENOUS at 00:49

## 2021-02-17 RX ADMIN — MIDODRINE HYDROCHLORIDE 10 MG: 5 TABLET ORAL at 14:18

## 2021-02-17 RX ADMIN — POTASSIUM CHLORIDE 20 MEQ: 14.9 INJECTION, SOLUTION INTRAVENOUS at 06:16

## 2021-02-17 RX ADMIN — METOPROLOL TARTRATE 25 MG: 25 TABLET, FILM COATED ORAL at 22:10

## 2021-02-17 RX ADMIN — ACETAMINOPHEN 650 MG: 325 TABLET, FILM COATED ORAL at 00:58

## 2021-02-17 RX ADMIN — POTASSIUM CHLORIDE 40 MEQ: 20 SOLUTION ORAL at 06:10

## 2021-02-17 NOTE — PROGRESS NOTES
Transfer/Progress Note - Phil Horne 1960, 63 y o  male MRN: [id number]    Unit/Bed#: ICU 08 Encounter: [id number]    Primary Care Provider: María Torres DO   Date and time admitted to hospital: 1/17/2021  4:16 PM        * Acute respiratory failure with hypoxia and hypercarbia (HCC)  Assessment & Plan  · 2/2 covid-19 with superimposed ESBL e  Coli PNA  · Intubated 2/1-2/16  · Extubated 2/16  · Off decadron 2/15  · Plan:   · Continue [address], titrate to maintain SPO2 >88%  · Ertapenem day 10/10      Diarrhea of presumed infectious origin  Assessment & Plan  · Present for multiple days  · Bowel regimen held 3 days ago and diarrhea persisted  · C   Diff pending    Pneumonia due to Escherichia coli [facility])  Assessment & Plan  · ESBL cont on Ertapenum d10/10      Seizure ([address] )  Assessment & Plan  · Patient does not have a seizure history but does have history of CVA in the past  · Neurology consulted and appreciate input, have signed off at this time ok with plan below  · Continue on Keppra 750mg BID  · EEG 2/2/21 Background activities are too slow suggesting moderate diffuse cerebral dysfunction of nonspecific etiology    Hypernatremia  Assessment & Plan  · Discontinued D5W @ 75cc/hr today  · 146 today  · monitor BMP q8h  · Continue lasix 40mg BID IV    Atrial fibrillation (HCC)  Assessment & Plan  · Tele: has had wide complex rhythm since extubation with PVCs  · Not on AC at home secondary to history of GI bleed  · Continue eliquis  · Holding cardizem secondary to hypotension  · Continue metoprolol 25mg BID    Depression  Assessment & Plan  · Continue home Zoloft BID  · Will need f/u psych eval for recent h/o suicidal ideations - required to return to nursing facility    Dysphagia  Assessment & Plan  · Swallow eval 2/16 - dysphagia 1 with nectar thick liquid diet  · Speech following    Acute metabolic encephalopathy  Assessment & Plan  · 2/2 hypoxia/hypercapnia and h/o CVA  · delirium precautions, regulate sleep/wake cycles  · Off sedation  · Melatonin hs and Seroquel hs    Hypokalemia  Assessment & Plan  · continue to monitor with BMP q8h and replete as necessary    Lower extremity edema  Assessment & Plan  · Continue 40mg lasix IV BID    History of stroke  Assessment & Plan  · approx 1 year ago  · Baseline resident at [facility]  · Plan:  ? Eliquis daily  ? Asa daily    Anemia  Assessment & Plan  · No active bleeding  · Trend CBC daily   · hgb stable    · Transfuse for Hgb < 7 0     Acute kidney injury superimposed on CKD [facility])  Assessment & Plan  Lab Results   Component Value Date    EGFR 54 02/13/2021    EGFR 52 02/12/2021    EGFR 47 02/11/2021    CREATININE 1 39 (H) 02/13/2021    CREATININE 1 44 (H) 02/12/2021    CREATININE 1 57 (H) 02/11/2021     · Creatine improved; appears to be at baseline   · Continue daily lasix with PRN dosing to maintain euvolemic/negative 500cc  · Renal following  · Cont Strict I/O  · Avoid nephrotoxic meds    Morbid obesity   Assessment & Plan  · Monitor BG    History of aortic valve replacement with bioprosthetic valve  Assessment & Plan  · Echo 1/2021 - EF 55%, bioprosthetic AV with gradient of 16  · Murmur present on exam today  · Hold home cardizem    Hyperlipidemia  Assessment & Plan  · Continue atorvastatin     Essential hypertension  Assessment & Plan  · Hold home cardizem in setting of hypotension  · Continue metoprolol 25mg BID for rate control  · BP's stable on Midodrine TID      COVID-19-resolved as of 2/17/2021  Assessment & Plan  · Confirmed positive 1/17  · Continue Decadron changed to 4mg/daily on 2/9  · VC/Zinc completed   Continue MV  · Continue statin per protocol  · Continue pepcid   · Actemra given 1/28  · Conv Plasma 1/17  · Cont Heparin drip per protocol for elevated D dimer  · Not candidate for remdesivir  · Cont Ertapenum for superimposed ESBL E choli bacterial pneumonia      Code Status: Level 1 - Full Code  POA:    POLST:      Reason for ICU admission:   Acute hypoxic and hypercarbic respiratory failure 2/2 covid with superimposed ESBL bacterial PNA and new onset seizures requiring intubation    Active problems:   Principal Problem:    Acute respiratory failure with hypoxia and hypercarbia (HCC)  Active Problems:    Hypokalemia    Acute metabolic encephalopathy    Dysphagia    Depression    Atrial fibrillation (HCC)    Hypernatremia    Seizure (HCC)    Pneumonia due to Escherichia coli (HCC)    Diarrhea of presumed infectious origin    Essential hypertension    Hyperlipidemia    History of aortic valve replacement with bioprosthetic valve    Morbid obesity     Acute kidney injury superimposed on CKD (HCC)    Anemia    History of stroke    Lower extremity edema  Resolved Problems:    Hyponatremia    COVID-19    Bacteremia    Hematuria      Consultants:   ID, Nephrology, pharmacy, neuro - all signed off  Psych still following for eval for SI, Palliative following    History of Present Illness:   He is a 62yo male with PMH of HTN, HLD, CKD3, Afib, CVA, anemia, Depression, and morbid obesity who was initially transferred from [facility] on 1/17 where he was intubated for respiratory distress and AMS  He was COVID + on 1/16  He was then extubated on 1/18 and improved and was transferred to the floor at that time  On 2/1 he had increased work of breathing and FIO2 requirements  He was transferred to [facility] and placed on BiPAP  That same day he had a witnessed tonic clinic seizure and was intubated and subsequently made critical care status  He was found to have ESBL e coli pna superimposed on his COVID PNA  He was started on Ertapenam  Since then he has made steady progress and was extubated yesterday 2/16  He is currently on [address] 8L and sats are 93-97%  He is on Keppra 750 BID for seizure ppx  Today we increased his metoprolol to 25mg BID today, consulted PT/OT, and reached out to Psych to see him and evaluate him for SI   He is at a nursing facility at baseline and they require a psych eval prior to return because he has a recent history or SI  He is off fluids at this time  He is on a heparin drip currently but is being transitioned to eliquis per pharmacy recs  He has had diarrhea for a few days so a c  Diff is pending  Speech evaluated him and is following, he is currently on a dysphasia 1 diet  He continues to be hypokalemic  We are monitoring BMP q8h and repleting the K as necessary  Today was his last day of Ertapenam     Summary of clinical course:   See above     Recent or scheduled procedures:   none    Outstanding/pending diagnostics: C  Diff    Cultures:   2/4/21 - sputum - ESBL e coli       Mobilization Plan:   PT/OT consulted today    Nutrition Plan:   Speech following - currently on dysphagia 1 diet with nectar thick liquids    Invasive Devices Review  Invasive Devices     Peripheral Intravenous Line            Long-Dwell Peripheral IV (Midline) 77/18/98 Left Cephalic 22 days    Peripheral IV 02/15/21 Right Forearm 1 day          Drain            Urethral Catheter Straight-tip 16 Fr  30 days    Rectal Tube With balloon 4 days                Rationale for remaining devices: Necessary for current management as above, can consider d/c bo and/or rectal tube tomorrow pending level of clinical improvement  VTE Pharmacologic Prophylaxis: Apixaban (Eliquis)  VTE Mechanical Prophylaxis: sequential compression device    Discharge Plan:   Patient should be ready for discharge to nursing facility on TBD after further improvement in his clinical status       Initial Physical Therapy Recommendations: pending  Initial Occupational Therapy Recommendations: pending  Initial /Plan: following and have been in contact with [facility]    Home medications that are not reordered and reason why:   Cardizem - hypotension  Allopurinol - not clinically indicated at this time  Bumex - receiving IV lasix    Spoke with Dr Sara Lomax and Dr Nabil Khoury regarding transfer  Please contact critical care via [place] with any questions or concerns  Portions of the record may have been created with voice recognition software  Occasional wrong word or "sound a like" substitutions may have occurred due to the inherent limitations of voice recognition software  Read the chart carefully and recognize, using context, where substitutions have occurred

## 2021-02-17 NOTE — PLAN OF CARE
Problem: Potential for Falls  Goal: Patient will remain free of falls  Description: INTERVENTIONS:  - Assess patient frequently for physical needs  -  Identify cognitive and physical deficits and behaviors that affect risk of falls  -  Henning fall precautions as indicated by assessment   - Educate patient/family on patient safety including physical limitations  - Instruct patient to call for assistance with activity based on assessment  - Modify environment to reduce risk of injury  - Consider OT/PT consult to assist with strengthening/mobility  Outcome: Progressing     Problem: SAFETY,RESTRAINT: NV/NON-SELF DESTRUCTIVE BEHAVIOR  Goal: Remains free of harm/injury (restraint for non violent/non self-detsructive behavior)  Description: INTERVENTIONS:  - Instruct patient/family regarding restraint use   - Assess and monitor physiologic and psychological status   - Provide interventions and comfort measures to meet assessed patient needs   - Identify and implement measures to help patient regain control  - Assess readiness for release of restraint   Outcome: Progressing  Goal: Returns to optimal restraint-free functioning  Description: INTERVENTIONS:  - Assess the patient's behavior and symptoms that indicate continued need for restraint  - Identify and implement measures to help patient regain control  - Assess readiness for release of restraint   Outcome: Progressing     Problem: Nutrition/Hydration-ADULT  Goal: Nutrient/Hydration intake appropriate for improving, restoring or maintaining nutritional needs  Description: Monitor and assess patient's nutrition/hydration status for malnutrition  Collaborate with interdisciplinary team and initiate plan and interventions as ordered  Monitor patient's weight and dietary intake as ordered or per policy  Utilize nutrition screening tool and intervene as necessary  Determine patient's food preferences and provide high-protein, high-caloric foods as appropriate  INTERVENTIONS:  - Monitor oral intake, urinary output, labs, and treatment plans  - Assess nutrition and hydration status and recommend course of action  - Evaluate amount of meals eaten  - Assist patient with eating if necessary   - Allow adequate time for meals  - Recommend/ encourage appropriate diets, oral nutritional supplements, and vitamin/mineral supplements  - Order, calculate, and assess calorie counts as needed  - Recommend, monitor, and adjust tube feedings and TPN/PPN based on assessed needs  - Assess need for intravenous fluids  - Provide specific nutrition/hydration education as appropriate  - Include patient/family/caregiver in decisions related to nutrition  Outcome: Progressing

## 2021-02-17 NOTE — PROGRESS NOTES
Progress Note - Neurology   Danny Patel 63 y o  male MRN: [id number]  Unit/Bed#: S -01 Encounter: [id number]      Assessment/Plan     Seizure [facility])  Assessment & Plan  63 y o  male with multiple prior strokes with residual left arm weakness and bilateral LE weakness, atrial fibrillation not on anticoagulation due to history of GI bleeding, HTN, HLD, and obesity who presented to [facility] ED on 1/17/2021 from [address] due to respiratory distress, fever, AMS, and COVID positive status  Due to hypercapnia and hypoxia, patient was emergently intubated and transferred to [place] ICU for further management  Patient was successfully extubated on 1/18 and transferred to the floor; however, required intermittent BiPAP and high flow nasal cannula due to low O2 sats  On 2/1/2021, patient's O2 sats were in the mid 80s, so patient was transferred back to the ICU  He was started on cefepime and Flagyl due to concern for pneumonia  The evening of 02/01/2021, patient had witnessed tonic-clonic seizure activity, was intubated for airway protection, and received Ativan 2 mg, propofol, succinylcholine, and etomidate with cessation of seizure after approximately 9 minutes  Patient was loaded with Keppra 2 g and started on maintenance Keppra 1 g BID  CT head showed stable encephalomalacia in the bilateral posterior parietal lobes, left anterior frontal lobe, and left cerebellum, but no hemorrhage or acute intracranial abnormalities  No known seizure history  Etiology for new onset seizure likely due to having a lowered seizure threshold from multiple prior strokes, current COVID-19 infection, transient hypoxia, and use of cefepime  Plan:  - Decreased Keppra from 1000 mg Q12 hours to 750 mg Q12 hours due to compromised renal function   Continue same   - Patient does not drive; currently living at [place] [id number] management and supportive care per primary team  Correction of any metabolic or infectious disturbances   - Follow up with outpatient epilepsy team  Office will call to schedule an appointment  Results:  - CT head x2:  · Stable areas of encephalomalacia in the bilateral posterior parietal lobes, left anterior frontal lobe and left cerebellar hemisphere is seen  There is no evidence of acute intracranial hemorrhage  There is no mass effect or midline shift   - routine EEG on 02/02/2021 demonstrated generalized slowing but no focal or epileptiform features, or electrographic seizures  - COVID-19 positive (1/17/2021)    History of stroke  Assessment & Plan  - Patient has had multiple prior strokes with residual left UE and bilateral LE weakness  - Patient is maintained on aspirin 81 mg and Lipitor 40 mg at home  Currently on Eliquis 2 5 mg b i d  due to elevated D-dimer in the setting of COVID-19 infection  Management as per primary service  - Strokes thought the be secondary to atrial fibrillation and previous contraindication for anticoagulation due to prior GI bleeds  - Patient previously followed with Dr  [employer] in the outpatient neurovascular clinic  - CT head on 2/3/2021 negative for any acute intracranial abnormalities    Atrial fibrillation ([address] )  Assessment & Plan  - Known atrial fibrillation but previously not on anticoagulation due to prior GI bleeding  - currently on Eliquis 2 5 mg b i d  due to elevated D-dimer in the setting of COVID infection  Essential hypertension  Assessment & Plan  - Normotensive goal   Blood pressures appear well controlled  In fact, patient on midodrine due to hypotensive issues  - Management per critical care team        Eleazar Espinoza will need follow up in in 6 weeks with epilepsy attending or advance practitioner  He will not require outpatient neurological testing  Subjective:   Patient states his right upper extremity itches and he has a slight headache  Says he feels thirsty    No lateralized weakness but feels generally weak  No numbness or paresthesias  No further reported seizures since 02/01/2021  ROS: 12 point review of systems performed and negative except as indicated above  Vitals: Blood pressure 105/66, pulse 68, temperature 98 7 °F (37 1 °C), temperature source Axillary, resp  rate 19, height 5' 11" (1 803 m), weight (!) 164 kg (361 lb 8 9 oz), SpO2 94 %  ,Body mass index is 50 43 kg/m²  Physical Exam:   General:  Patient is well-developed, morbidly obese BMI 50 43, and in no acute distress  HEENT:  Head normocephalic  Eyes anicteric  Cardiovascular:  With irregular rhythm  Lungs:  Normal effort  Nonlabored breathing  Extremities:  Bilateral distal lower extremity edema noted  Skin: No rashes  Mental Status:  Patient is alert  Oriented to person, place, month, but states it is 2008  Correctly identifies current POTUS  Gait deferred for safety  Cranial Nerves:   II: Visual fields full to confrontation  Pupils equal, round, reactive to light with normal accomodation  Cannot visualize optic fundi  III,IV,VI: extraocular movements intact with no nystagmus  V: Sensation in the V1 through V3 distributions intact to light touch bilaterally  VII:  Symmetrical smile  VIII: Audition intact to finger rub bilaterally  IX/X: Uvula midline  Soft palate elevation symmetric  XII: Tongue midline with no atrophy or fasciculations with appropriate movement  Patient able to lift all 4 extremities antigravity for a few seconds but they quickly fall to the bed  No obvious lateralized weakness  Sensory testing grossly intact to light touch throughout       Lab, Imaging and other studies:   CBC:   Results from last 7 days   Lab Units 02/17/21  0442 02/16/21  0317 02/15/21  0422   WBC Thousand/uL 8 02 10 60* 9 54   RBC Million/uL 3 58* 3 82* 3 75*   HEMOGLOBIN g/dL 8 5* 9 2* 9 0*   HEMATOCRIT % 31 4* 33 2* 32 4*   MCV fL 88 87 86   PLATELETS Thousands/uL 162 167 161   , BMP/CMP: Results from last 7 days   Lab Units 02/17/21  0442 02/16/21 1956 02/16/21  1148   SODIUM mmol/L 146* 147* 151*   POTASSIUM mmol/L 2 8* 3 0* 3 1*   CHLORIDE mmol/L 109* 111* 113*   CO2 mmol/L 30 30 30   BUN mg/dL 46* 44* 49*   CREATININE mg/dL 1 34* 1 25 1 27   CALCIUM mg/dL 8 6 8 3 8 7   EGFR ml/min/1 73sq m 57 62 61     VTE Prophylaxis: Sequential compression device (Venodyne)     Counseling / Coordination of Care  Total Critical Care time spent 30 minutes excluding procedures, teaching and family updates

## 2021-02-17 NOTE — ASSESSMENT & PLAN NOTE
· Present for multiple days  · Bowel regimen held 3 days ago and diarrhea persisted  · C   Diff pending

## 2021-02-17 NOTE — ASSESSMENT & PLAN NOTE
· Echo 1/2021 - EF 55%, bioprosthetic AV with gradient of 16  · Murmur present on exam today  · Hold home cardizem

## 2021-02-17 NOTE — PROGRESS NOTES
Progress Note - Palliative & Supportive Care  Lay Catherine  63 y o   male  ICU 08/ICU 08   MRN: [id number]  Encounter: [id number]     Assessment  Patient Active Problem List   Diagnosis    Aortic stenosis, mild    Essential hypertension    Hyperlipidemia    Left bundle branch block    Murmur    Osteoarthritis of both knees    Pericardial disease    Sciatica    Age-related cataract of right eye    Venous insufficiency    Bilateral leg edema    Stress-induced cardiomyopathy    History of aortic valve replacement with bioprosthetic valve    Persistent atrial fibrillation (HCC)    Leukocytosis    Acute metabolic encephalopathy    Skin rash    Hyponatremia    Coagulopathy (HCC)    Age-related nuclear cataract, bilateral    Open angle with borderline findings, low risk, bilateral    Presence of intraocular lens    Vitreous degeneration of both eyes    Left arm swelling    Dysphagia    Left internal jugular vein thrombus    Morbid obesity     Depression    Gastric ulcer    Atrial fibrillation (HCC)    COVID-19    Acute respiratory failure with hypoxia and hypercarbia (HCC)    Acute kidney injury superimposed on CKD (HCC)    Anemia    Hypernatremia    Seizure (HCC)    History of stroke    CKD (chronic kidney disease), stage III    H/O heart valve replacement with tissue graft    Pneumonia due to Escherichia coli (HCC)     Goals of Care  Level 1 code status  Disease focused care  Spouse is requesting full treatment and is agreeable to CRP, re intubation, trach and PEG should that become necessary  Palliative Medicine will sign off at this time as goals are clear and we are not currently managing any symptoms  Please re consult if necessary or patient's situation changes  Plan  Symptom Management  Will defer symptom management to the ICU team at this time  24 History  Chart reviewed before visit      Currently on midflow  No pain  In restraints  Eating small amounts  ICU team confirmed goals yesterday    Review of Systems: Pertinent items are noted in HPI      Medications    Current Facility-Administered Medications:     acetaminophen (TYLENOL) tablet 650 mg, 650 mg, Oral, Q6H PRN, Melvin Caro PA-C, 650 mg at 02/17/21 0058    aspirin chewable tablet 81 mg, 81 mg, Oral, Daily, KATHY Martin, 81 mg at 02/16/21 0900    atorvastatin (LIPITOR) tablet 40 mg, 40 mg, Per NG Tube, HS, Melvin Caro PA-C, 40 mg at 02/16/21 2121    cholecalciferol (VITAMIN D3) tablet 2,000 Units, 2,000 Units, Per NG Tube, Daily, Melvin Caro PA-C, 2,000 Units at 02/16/21 0900    dextrose 5 % infusion, 75 mL/hr, Intravenous, Continuous, Pearle Favors, DO, Last Rate: 75 mL/hr at 02/17/21 0049, 75 mL/hr at 02/17/21 0049    furosemide (LASIX) injection 40 mg, 40 mg, Intravenous, BID (diuretic), Babar Hannah PA-C, 40 mg at 02/17/21 0743    heparin (porcine) 25,000 units in 0 45% NaCl 250 mL infusion (premix), 3-30 Units/kg/hr (Order-Specific), Intravenous, Titrated, Eve Pinch, DO, Last Rate: 13 8 mL/hr at 02/17/21 0620, 11 Units/kg/hr at 02/17/21 0620    heparin (porcine) injection 10,000 Units, 10,000 Units, Intravenous, Q1H PRN, Eve Pinch, DO    heparin (porcine) injection 5,000 Units, 5,000 Units, Intravenous, Q1H PRN, Eve Pinch, DO, 5,000 Units at 02/13/21 0625    insulin lispro (HumaLOG) 100 units/mL subcutaneous injection 1-6 Units, 1-6 Units, Subcutaneous, Q6H [address], 1 Units at 02/07/21 0608 **AND** Fingerstick Glucose (POCT), , , Q6H, KATHY Domingo    levETIRAcetam (KEPPRA) 750 mg in sodium chloride 0 9 % 100 mL IVPB, 750 mg, Intravenous, Q12H [address], Fern Hannah PA-C, Stopped at 02/16/21 2201    melatonin tablet 6 mg, 6 mg, Oral, HS, KATHY Domingo, 6 mg at 02/16/21 2121    metoprolol tartrate (LOPRESSOR) partial tablet 12 5 mg, 12 5 mg, Oral, Q12H [address], KATHY Odell, 12 5 mg at 02/16/21 2002   midodrine (PROAMATINE) tablet 10 mg, 10 mg, Oral, Q8H, Tiara Shepherd, CRNP, 10 mg at 02/17/21 0602    multivitamin-minerals (CENTRUM) tablet 1 tablet, 1 tablet, Oral, Daily, Yulisa Boswell, DO, 1 tablet at 02/16/21 0912    omeprazole (PRILOSEC) suspension 2 mg/mL, 20 mg, Oral, Q12H, , CRNP, 20 mg at 02/17/21 0451    potassium chloride 20 mEq IVPB (premix), 20 mEq, Intravenous, Q2H, Leroy Martin PA-C, Last Rate: 50 mL/hr at 02/17/21 0746, 20 mEq at 02/17/21 0746    sertraline (ZOLOFT) tablet 50 mg, 50 mg, Oral, HS, Savannah Cesar CRNP, 50 mg at 02/16/21 2121    sertraline (ZOLOFT) tablet 75 mg, 75 mg, Oral, Daily, , CRNP, 75 mg at 02/16/21 0859    Objective  /80 (BP Location: Right arm)   Pulse 91   Temp (!) 97 1 °F (36 2 °C) (Axillary)   Resp (!) 28   Ht 5' 11" (1 803 m)   Wt (!) 163 kg (358 lb 7 5 oz)   SpO2 92%   BMI 50 00 kg/m²   Physical Exam:   Constitutional: Obese  In no acute distress  Head: Normocephalic and atraumatic  Eyes: closed  Neck: no visible adenopathy or masses  Respiratory: No respiratory distress on mid flow O2  Gastrointestinal: No abdominal distension  Musculoskeletal: No edema  Neurological: confused at baseline  Skin: Dry, no diaphoresis  Pale  Psychiatric: Calm at time of visit but unable to assess further today    Lab Results:   I have personally reviewed pertinent labs  , CBC:   Lab Results   Component Value Date    WBC 8 02 02/17/2021    HGB 8 5 (L) 02/17/2021    HCT 31 4 (L) 02/17/2021    MCV 88 02/17/2021     02/17/2021    MCH 23 7 (L) 02/17/2021    MCHC 27 1 (L) 02/17/2021    RDW 26 2 (H) 02/17/2021    MPV 11 7 02/17/2021    NRBC 0 02/17/2021   , CMP:   Lab Results   Component Value Date    SODIUM 146 (H) 02/17/2021    K 2 8 (L) 02/17/2021     (H) 02/17/2021    CO2 30 02/17/2021    BUN 46 (H) 02/17/2021    CREATININE 1 34 (H) 02/17/2021    CALCIUM 8 6 02/17/2021    EGFR 57 02/17/2021     Counseling / Coordination of Care  Total floor / unit time spent today 15 minutes     Brittany Roque, [address] [facility]

## 2021-02-17 NOTE — PROGRESS NOTES
Daily Progress Note - Critical Care   Rico Andujar 63 y o  male MRN: [id number]  Unit/Bed#: ICU 08 Encounter: [id number]        ----------------------------------------------------------------------------------------  HPI/24hr events:   Swallow eval completed - now on dysphagia 1 diet with nectar thick liquids  Potassium early this AM was 2 8, repleted and repeat BMP scheduled for 1200  Tele: continues to have wide complex rhythm with PVC's, periods of bigeminy     VS: HR , RR 20's-30's, BP 90's-110's/50's-60's, MAP 70's-80's, SPO2 93-97% on [address] [facility] 8    I: 2137 O: 8790, Net neg 0360    ---------------------------------------------------------------------------------------  SUBJECTIVE  Patient offers no complaints this morning  He reports overall his breathing is unchanged  Review of Systems   Constitutional: Negative for chills and fever  Respiratory: Positive for shortness of breath  Negative for cough  Cardiovascular: Positive for leg swelling  Negative for chest pain and palpitations  Gastrointestinal: Negative for abdominal pain, nausea and vomiting  Rectal tube   Genitourinary: Turner   All other systems reviewed and are negative  Review of systems was reviewed and negative unless stated above in HPI/24-hour events   ---------------------------------------------------------------------------------------  Assessment and Plan:  Neuro:   · Acute Metabolic Encephalopathy  ? Plan:   § 2/2 hypoxia/hypercapnia and h/o CVA  § delirium precautions, regulate sleep/wake cycles  § CAM ICU daily  § Off sedation  § Melatonin hs and Seroquel hs  · Seizure 2/1  ? EEG 2/2/21 Background activities are too slow suggesting moderate diffuse cerebral dysfunction of nonspecific etiology  ? Neuro - no intervention at this time  ? Plan:   § Transition keppra dose to 500 BID  · Hx of CVA  ? approx 1 year ago  ? Baseline resident at [facility]  ?  Plan:  [address] [place] [address] daily  · Depression  ? Plan:  § Continue zoloft  § Will need f/u psych eval for recent h/o suicidal ideations      CV:   · Atrial fibrillation  ? Not on AC at home 2/2 h/o GI bleed  ? Plan:   § Heparin gtt  § Metoprolol 12 5 BID  § Tele monitoring  · Cardiomyopathy  ? Echo 1/2021 - EF 55%, bioprosthetic AV  ? Murmur present on exam today  ? Plan:   § Continue Midodrine TID  § Hold home bumex  § IV lasix 40mg BID  § Consider repeat echo  § Consider cardiology consult  · Essential HTN  ? Plan:  § Metoprolol 12 5mg BID  § Hold home cardizem  · Hyperlipidemia  ? Plan:  § Continue statin     Pulm:  · Acute hypoxic respiratory failure   ? 22 covid-19 with superimposed ESBL e  Coli PNA  ? Intubated 2/1-2/16  ? Extubated 2/16  ? Off decadron 2/15  ? Plan:   § Continue [address], titrate to maintain SPO2 >88%  · PNA due to ESBL E  Coli  ? Plan:   § Ertapenem day 10/10     GI:   · Dysphagia at baseline  ? OG tube placed 2/14, removed 2/16  ? Swallow eval: dysphagia 1 with nectar thick   ? Plan:  § GI ppx: Omeprazole  · Diarrhea  ? Rectal tube in place -1350cc  ? Plan:  § Bowel regimen on hold  § C  Diff pending     :   · PATRICK superimposed on CKD3 - RESOLVED  ? Cr today 1 34  ? Plan:   § Continue to trend BUN/Cr  § Strict I/O     F/E/N:   · Plan:   § Fluids: D5W @ 75cc/hr  § Electrolytes:  § Hypernatremia: 146 today, continue fluids as above, monitor BMP q8h  § Hypokalemia: continue to monitor with BMP q8h and replete as necessary  § Nutrition: Dysphagia 1 with nectar thick diet     Heme/Onc:   · Anemia  ? Plan:   § Continue to monitor AM CBC  § Transfuse as needed for Hgb <7  § No active source of bleeding     Endo:   · Morbid Obesity  ? Plan:   § Continue to monitor blood glucose goal 140-180     ID:   · Covid-19 PNA with superimposed ESBL E  Coli PNA  ? >20 days since covid diagnosis - off precautions  ? Completed Covid severe pathway management  ? Off decadron 2/15  ? Actemra 1/28  ? Plasma 1/17  ?  Plan:   § continue Ertapenem day 10/10     MSK/Skin:   · Bilateral LE edema  ? Plan:   § Elevate LE  § Continue lasix 40 BID  § PT/OT consult  § Local wound care as needed    Disposition: Transfer to Stepdown Level 1   Code Status: Level 1 - Full Code  ---------------------------------------------------------------------------------------  ICU CORE MEASURES    Prophylaxis   VTE Pharmacologic Prophylaxis: Heparin Drip  VTE Mechanical Prophylaxis: sequential compression device  Stress Ulcer Prophylaxis: Omeprazole PO    ABCDE Protocol (if indicated)  Plan to perform spontaneous awakening trial today? Not applicable  Plan to perform spontaneous breathing trial today? Not applicable  Obvious barriers to extubation? Not applicable  CAM-ICU: Negative    Invasive Devices Review  Invasive Devices     Peripheral Intravenous Line            Long-Dwell Peripheral IV (Midline)  Left Cephalic 22 days    Peripheral IV 02/15/21 Right Forearm 1 day          Drain            Urethral Catheter Straight-tip 16 Fr  30 days    NG/OG/Enteral Tube Orogastric 15 days    Rectal Tube With balloon 4 days              Can any invasive devices be discontinued today?  Yes  ---------------------------------------------------------------------------------------  OBJECTIVE    Vitals   Vitals:    21 0400 21 0500 21 0531 21 0600   BP: 96/54 90/57     Pulse: 81 74  74   Resp: 21 (!) 25     Temp: (!) 97 3 °F (36 3 °C) (!) 97 °F (36 1 °C)  (!) 97 °F (36 1 °C)   TempSrc:       SpO2: 96% 97%  99%   Weight:   (!) 163 kg (358 lb 7 5 oz)    Height:         Temp (24hrs), Av 3 °F (36 8 °C), Min:97 °F (36 1 °C), Max:99 °F (37 2 °C)  Current: Temperature: (!) 97 °F (36 1 °C)  HR: 92  BP: 113/64  RR: 29  SpO2: 95    Respiratory:  SpO2: SpO2: 99 %, SpO2 Activity: SpO2 Activity: At Rest, SpO2 Device: O2 Device: Mid flow nasal cannula  Nasal Cannula O2 Flow Rate (L/min): 50 L/min    Invasive/non-invasive ventilation settings   Respiratory    Lab Data (Last 4 hours)    None         O2/Vent Data (Last 4 hours)    None                Physical Exam  Constitutional:       General: He is not in acute distress  Appearance: He is obese  He is ill-appearing  HENT:      Head: Normocephalic and atraumatic  Right Ear: External ear normal       Left Ear: External ear normal       Nose: Nose normal       Mouth/Throat:      Mouth: Mucous membranes are moist       Pharynx: Oropharynx is clear  Eyes:      Extraocular Movements: Extraocular movements intact  Conjunctiva/sclera: Conjunctivae normal       Pupils: Pupils are equal, round, and reactive to light  Neck:      Musculoskeletal: Neck supple  No muscular tenderness  Cardiovascular:      Rate and Rhythm: Normal rate and regular rhythm  Heart sounds: Murmur present  Pulmonary:      Breath sounds: No wheezing or rhonchi  Abdominal:      General: Bowel sounds are normal  There is no distension  Palpations: Abdomen is soft  Tenderness: There is no abdominal tenderness  Genitourinary:     Comments: Turner and rectal tube in  Musculoskeletal:      Right lower leg: Edema present  Left lower leg: Edema present  Skin:     General: Skin is warm and dry  Capillary Refill: Capillary refill takes less than 2 seconds  Coloration: Skin is not pale  Neurological:      Mental Status: He is alert     Psychiatric:         Mood and Affect: Mood normal          Behavior: Behavior normal          Laboratory and Diagnostics:  Results from last 7 days   Lab Units 02/17/21  0442 02/16/21  0317 02/15/21  0422 02/14/21  0459 02/13/21  0438 02/11/21  0533   WBC Thousand/uL 8 02 10 60* 9 54 9 58 8 23 9 24   HEMOGLOBIN g/dL 8 5* 9 2* 9 0* 8 9* 9 3* 9 0*   HEMATOCRIT % 31 4* 33 2* 32 4* 31 8* 33 8* 32 8*   PLATELETS Thousands/uL 162 167 161 171 193 187   NEUTROS PCT %  --  75 74  --   --   --    BANDS PCT % 5  --   --  1 2 3   MONOS PCT %  --  8 7  --   --   --    MONO PCT % 3*  --   --  7 7 5 Results from last 7 days   Lab Units 02/17/21  0442 02/16/21  1956 02/16/21  1148 02/16/21  0317 02/15/21  1949 02/15/21  1257 02/15/21  0422   SODIUM mmol/L 146* 147* 151* 149* 148* 151* 147*   POTASSIUM mmol/L 2 8* 3 0* 3 1* 3 1* 3 2* 3 8 2 7*   CHLORIDE mmol/L 109* 111* 113* 111* 110* 111* 109*   CO2 mmol/L 30 30 30 32 32 31 32   ANION GAP mmol/L 7 6 8 6 6 9 6   BUN mg/dL 46* 44* 49* 53* 55* 53* 54*   CREATININE mg/dL 1 34* 1 25 1 27 1 39* 1 40* 1 24 1 28   CALCIUM mg/dL 8 6 8 3 8 7 8 9 8 9 8 9 8 7   GLUCOSE RANDOM mg/dL 113 91 105 104 126 100 81     Results from last 7 days   Lab Units 02/17/21 0442 02/16/21 0317 02/15/21  0422 02/14/21  0459 02/13/21  0438 02/12/21  0433 02/11/21  0533 02/10/21  1930   MAGNESIUM mg/dL 2 4 2 1  --  2 7* 3 0* 3 2* 3 0* 2 6   PHOSPHORUS mg/dL 4 4* 3 4 3 9  --   --   --   --   --       Results from last 7 days   Lab Units 02/17/21  0442 02/16/21  0317 02/15/21  0422 02/14/21  0459 02/13/21  1839 02/13/21  1223 02/13/21  0438   PTT seconds 122* 90* 68* 72* 83* 67* 58*      Results from last 7 days   Lab Units 02/16/21  1317   TROPONIN I ng/mL <0 02         ABG:    VBG:          Micro        EKG: PVC's, wide complexes  Imaging: no new I have personally reviewed pertinent reports  Intake and Output  I/O       02/15 0701 - 02/16 0700 02/16 0701 - 02/17 0700    P  O  0 150    I V  (mL/kg) 467 8 (2 8) 1696 9 (10 4)    NG/GT 2130     IV Piggyback 250 200    Feedings 765 90    Total Intake(mL/kg) 3612 8 (21 9) 2136 9 (13 1)    Urine (mL/kg/hr) 3550 (0 9) 2605 (0 7)    Stool 1900 1050    Total Output 5450 3655    Net -1833 2 -7638 1              Height and Weights   Height: 5' 11" (180 3 cm)  IBW: 75 3 kg  Body mass index is 50 kg/m²    Weight (last 2 days)     Date/Time   Weight    02/17/21 0531   (!) 163 (358 47)    02/16/21 0541   (!) 165 (870 76)              Nutrition       Diet Orders   (From admission, onward)             Start     Ordered    02/16/21 1606  Diet Dysphagia/Modified Consistency; Dysphagia 1-Pureed; Nectar Thick Liquid; Sodium 2 GM  Diet effective now     Comments: PO meds crushed   Question Answer Comment   Diet Type Dysphagia/Modified Consistency    Dysphagia/Modified Consistency Dysphagia 1-Pureed    Liquid Modifier Nectar Thick Liquid    Other Restriction(s): Sodium 2 GM    RD to adjust diet per protocol?  Yes        02/16/21 1608    01/17/21 1719  Room Service  Once     Question:  Type of Service  Answer:  Room Service- Not Appropriate    01/17/21 1718              Active Medications  Scheduled Meds:  Current Facility-Administered Medications   Medication Dose Route Frequency Provider Last Rate    acetaminophen  650 mg Oral Q6H PRN Jennifer Hinton PA-C      aspirin  81 mg Oral Daily KATHY Hdz      atorvastatin  40 mg Per NG Tube HS Jennifer Hinton PA-C      bisacodyl  10 mg Rectal Daily PRN Jennifer Hinton PA-C      cholecalciferol  2,000 Units Per NG Tube Daily Jennifer Hinton PA-C      dextrose  75 mL/hr Intravenous Continuous Sarah Harper, DO 75 mL/hr (02/17/21 0049)    furosemide  40 mg Intravenous BID (diuretic) Antonio Richter PA-C      heparin (porcine)  3-30 Units/kg/hr (Order-Specific) Intravenous Titrated Rhesa Rhyme, DO 11 Units/kg/hr (02/17/21 0620)    heparin (porcine)  10,000 Units Intravenous Q1H PRN Rhesa Rhyme, DO      heparin (porcine)  5,000 Units Intravenous Q1H PRN Rhesa Rhyme, DO      insulin lispro  1-6 Units Subcutaneous Q6H [address] KATHY Domingo      levETIRAcetam  750 mg Intravenous Q12H [address] Antonio Richter PA-C Stopped (02/16/21 2201)    melatonin  6 mg Oral HS KATHY Hdz      metoprolol tartrate  12 5 mg Oral Q12H [address] KATHY Odell      midodrine  10 mg Oral Q8H KATHY Arita      multivitamin-minerals  1 tablet Oral Daily Mariia Manzano DO      omeprazole (PRILOSEC) suspension 2 mg/mL  20 mg Oral Q12H [place] FIELD KATHY Cesar      polyethylene glycol  17 g Oral BID Aimee Claudio PA-C      potassium chloride  20 mEq Intravenous Q2H Aimee Claudio PA-C 20 mEq (02/17/21 1271)    senna-docusate sodium  1 tablet Per NG Tube BID KATHY Magdaleno      sertraline  50 mg Oral HS Blount StaciesKATHY      sertraline  75 mg Oral Daily KATHY Magdaleno       Continuous Infusions:  dextrose, 75 mL/hr, Last Rate: 75 mL/hr (02/17/21 0049)  heparin (porcine), 3-30 Units/kg/hr (Order-Specific), Last Rate: 11 Units/kg/hr (02/17/21 0620)      PRN Meds:   acetaminophen, 650 mg, Q6H PRN  bisacodyl, 10 mg, Daily PRN  heparin (porcine), 10,000 Units, Q1H PRN  heparin (porcine), 5,000 Units, Q1H PRN        Allergies   Allergies   Allergen Reactions    Amiodarone      Developed Rash    Penicillins Rash     However, has subsequently tolerated Cefazolin and Cefepime     ---------------------------------------------------------------------------------------  Advance Directive and Living Will:      Power of :    POLST:    ---------------------------------------------------------------------------------------  Care Time Delivered:   Upon my evaluation, this patient had a high probability of imminent or life-threatening deterioration due to above, which required my direct attention, intervention, and personal management  I have personally provided 30 minutes (700 to 730) of critical care time, exclusive of procedures, teaching, family meetings, and any prior time recorded by providers other than myself  Sharren Brenda, DO      Portions of the record may have been created with voice recognition software  Occasional wrong word or "sound a like" substitutions may have occurred due to the inherent limitations of voice recognition software    Read the chart carefully and recognize, using context, where substitutions have occurred

## 2021-02-17 NOTE — QUICK NOTE
I called and spoke with the patient's wife, [facility]  I informed her of the plan to transfer him out of the unit today  I also updated her on the plan regarding changes to his medications and testing him for c  Diff  She was in agreement with this plan  I answered any questions she had and she was very appreciative of the call

## 2021-02-18 LAB
ANION GAP SERPL CALCULATED.3IONS-SCNC: 10 MMOL/L (ref 4–13)
ANION GAP SERPL CALCULATED.3IONS-SCNC: 11 MMOL/L (ref 4–13)
ANISOCYTOSIS BLD QL SMEAR: PRESENT
ATRIAL RATE: 107 BPM
ATRIAL RATE: 97 BPM
BASOPHILS # BLD MANUAL: 0.07 THOUSAND/UL (ref 0–0.1)
BASOPHILS NFR MAR MANUAL: 1 % (ref 0–1)
BUN SERPL-MCNC: 39 MG/DL (ref 5–25)
BUN SERPL-MCNC: 42 MG/DL (ref 5–25)
C DIFF TOX B TCDB STL QL NAA+PROBE: NEGATIVE
CALCIUM SERPL-MCNC: 8.1 MG/DL (ref 8.3–10.1)
CALCIUM SERPL-MCNC: 8.9 MG/DL (ref 8.3–10.1)
CHLORIDE SERPL-SCNC: 110 MMOL/L (ref 100–108)
CHLORIDE SERPL-SCNC: 113 MMOL/L (ref 100–108)
CO2 SERPL-SCNC: 25 MMOL/L (ref 21–32)
CO2 SERPL-SCNC: 28 MMOL/L (ref 21–32)
CREAT SERPL-MCNC: 1.07 MG/DL (ref 0.6–1.3)
CREAT SERPL-MCNC: 1.34 MG/DL (ref 0.6–1.3)
EOSINOPHIL # BLD MANUAL: 0.73 THOUSAND/UL (ref 0–0.4)
EOSINOPHIL NFR BLD MANUAL: 10 % (ref 0–6)
ERYTHROCYTE [DISTWIDTH] IN BLOOD BY AUTOMATED COUNT: 26 % (ref 11.6–15.1)
GFR SERPL CREATININE-BSD FRML MDRD: 57 ML/MIN/1.73SQ M
GFR SERPL CREATININE-BSD FRML MDRD: 75 ML/MIN/1.73SQ M
GLUCOSE SERPL-MCNC: 107 MG/DL (ref 65–140)
GLUCOSE SERPL-MCNC: 108 MG/DL (ref 65–140)
GLUCOSE SERPL-MCNC: 161 MG/DL (ref 65–140)
GLUCOSE SERPL-MCNC: 225 MG/DL (ref 65–140)
GLUCOSE SERPL-MCNC: 85 MG/DL (ref 65–140)
GLUCOSE SERPL-MCNC: 88 MG/DL (ref 65–140)
HCT VFR BLD AUTO: 31.7 % (ref 36.5–49.3)
HGB BLD-MCNC: 8.9 G/DL (ref 12–17)
HYPERCHROMIA BLD QL SMEAR: PRESENT
LYMPHOCYTES # BLD AUTO: 0.15 THOUSAND/UL (ref 0.6–4.47)
LYMPHOCYTES # BLD AUTO: 2 % (ref 14–44)
MAGNESIUM SERPL-MCNC: 2.2 MG/DL (ref 1.6–2.6)
MCH RBC QN AUTO: 24.7 PG (ref 26.8–34.3)
MCHC RBC AUTO-ENTMCNC: 28.1 G/DL (ref 31.4–37.4)
MCV RBC AUTO: 88 FL (ref 82–98)
MONOCYTES # BLD AUTO: 0.73 THOUSAND/UL (ref 0–1.22)
MONOCYTES NFR BLD: 10 % (ref 4–12)
NEUTROPHILS # BLD MANUAL: 5.64 THOUSAND/UL (ref 1.85–7.62)
NEUTS BAND NFR BLD MANUAL: 2 % (ref 0–8)
NEUTS SEG NFR BLD AUTO: 75 % (ref 43–75)
NRBC BLD AUTO-RTO: 0 /100 WBCS
P AXIS: 36 DEGREES
P AXIS: 59 DEGREES
PHOSPHATE SERPL-MCNC: 3.9 MG/DL (ref 2.3–4.1)
PLATELET # BLD AUTO: 139 THOUSANDS/UL (ref 149–390)
PLATELET BLD QL SMEAR: ABNORMAL
PMV BLD AUTO: 11.8 FL (ref 8.9–12.7)
POLYCHROMASIA BLD QL SMEAR: PRESENT
POTASSIUM SERPL-SCNC: 2.6 MMOL/L (ref 3.5–5.3)
POTASSIUM SERPL-SCNC: 3.3 MMOL/L (ref 3.5–5.3)
PR INTERVAL: 190 MS
PR INTERVAL: 198 MS
QRS AXIS: 75 DEGREES
QRS AXIS: 76 DEGREES
QRSD INTERVAL: 170 MS
QRSD INTERVAL: 172 MS
QT INTERVAL: 404 MS
QT INTERVAL: 416 MS
QTC INTERVAL: 528 MS
QTC INTERVAL: 539 MS
RBC # BLD AUTO: 3.61 MILLION/UL (ref 3.88–5.62)
SODIUM SERPL-SCNC: 148 MMOL/L (ref 136–145)
SODIUM SERPL-SCNC: 149 MMOL/L (ref 136–145)
T WAVE AXIS: -59 DEGREES
T WAVE AXIS: -62 DEGREES
TOTAL CELLS COUNTED SPEC: 100
VENTRICULAR RATE: 107 BPM
VENTRICULAR RATE: 97 BPM
WBC # BLD AUTO: 7.32 THOUSAND/UL (ref 4.31–10.16)

## 2021-02-18 PROCEDURE — 99232 SBSQ HOSP IP/OBS MODERATE 35: CPT | Performed by: FAMILY MEDICINE

## 2021-02-18 PROCEDURE — 93010 ELECTROCARDIOGRAM REPORT: CPT | Performed by: INTERNAL MEDICINE

## 2021-02-18 PROCEDURE — 84100 ASSAY OF PHOSPHORUS: CPT | Performed by: FAMILY MEDICINE

## 2021-02-18 PROCEDURE — 99232 SBSQ HOSP IP/OBS MODERATE 35: CPT | Performed by: PSYCHIATRY & NEUROLOGY

## 2021-02-18 PROCEDURE — 85027 COMPLETE CBC AUTOMATED: CPT | Performed by: FAMILY MEDICINE

## 2021-02-18 PROCEDURE — 80048 BASIC METABOLIC PNL TOTAL CA: CPT | Performed by: FAMILY MEDICINE

## 2021-02-18 PROCEDURE — 85007 BL SMEAR W/DIFF WBC COUNT: CPT | Performed by: FAMILY MEDICINE

## 2021-02-18 PROCEDURE — 94760 N-INVAS EAR/PLS OXIMETRY 1: CPT

## 2021-02-18 PROCEDURE — 94762 N-INVAS EAR/PLS OXIMTRY CONT: CPT

## 2021-02-18 PROCEDURE — 94660 CPAP INITIATION&MGMT: CPT

## 2021-02-18 PROCEDURE — 99232 SBSQ HOSP IP/OBS MODERATE 35: CPT | Performed by: INTERNAL MEDICINE

## 2021-02-18 PROCEDURE — 97163 PT EVAL HIGH COMPLEX 45 MIN: CPT

## 2021-02-18 PROCEDURE — 83735 ASSAY OF MAGNESIUM: CPT | Performed by: FAMILY MEDICINE

## 2021-02-18 PROCEDURE — 92526 ORAL FUNCTION THERAPY: CPT

## 2021-02-18 PROCEDURE — 82948 REAGENT STRIP/BLOOD GLUCOSE: CPT

## 2021-02-18 PROCEDURE — 97167 OT EVAL HIGH COMPLEX 60 MIN: CPT

## 2021-02-18 RX ORDER — POTASSIUM CHLORIDE 20 MEQ/1
40 TABLET, EXTENDED RELEASE ORAL ONCE
Status: COMPLETED | OUTPATIENT
Start: 2021-02-18 | End: 2021-02-18

## 2021-02-18 RX ORDER — POTASSIUM CHLORIDE 20MEQ/15ML
40 LIQUID (ML) ORAL ONCE
Status: DISCONTINUED | OUTPATIENT
Start: 2021-02-18 | End: 2021-02-18

## 2021-02-18 RX ORDER — BUMETANIDE 1 MG/1
2 TABLET ORAL DAILY
Status: DISCONTINUED | OUTPATIENT
Start: 2021-02-18 | End: 2021-02-19

## 2021-02-18 RX ORDER — POTASSIUM CHLORIDE 14.9 MG/ML
20 INJECTION INTRAVENOUS
Status: DISCONTINUED | OUTPATIENT
Start: 2021-02-18 | End: 2021-02-18

## 2021-02-18 RX ADMIN — APIXABAN 2.5 MG: 2.5 TABLET, FILM COATED ORAL at 17:26

## 2021-02-18 RX ADMIN — MIDODRINE HYDROCHLORIDE 10 MG: 5 TABLET ORAL at 21:43

## 2021-02-18 RX ADMIN — LEVETIRACETAM 750 MG: 500 TABLET, FILM COATED ORAL at 08:16

## 2021-02-18 RX ADMIN — POTASSIUM CHLORIDE 40 MEQ: 1500 TABLET, EXTENDED RELEASE ORAL at 08:15

## 2021-02-18 RX ADMIN — SERTRALINE HYDROCHLORIDE 50 MG: 50 TABLET ORAL at 21:43

## 2021-02-18 RX ADMIN — Medication 1 TABLET: at 08:16

## 2021-02-18 RX ADMIN — POTASSIUM CHLORIDE 40 MEQ: 1500 TABLET, EXTENDED RELEASE ORAL at 15:25

## 2021-02-18 RX ADMIN — POTASSIUM CHLORIDE 40 MEQ: 1500 TABLET, EXTENDED RELEASE ORAL at 17:26

## 2021-02-18 RX ADMIN — Medication 2000 UNITS: at 08:15

## 2021-02-18 RX ADMIN — BUMETANIDE 2 MG: 1 TABLET ORAL at 15:25

## 2021-02-18 RX ADMIN — LEVETIRACETAM 750 MG: 500 TABLET, FILM COATED ORAL at 21:43

## 2021-02-18 RX ADMIN — Medication 20 MG: at 17:11

## 2021-02-18 RX ADMIN — Medication 6 MG: at 21:43

## 2021-02-18 RX ADMIN — SERTRALINE 75 MG: 25 TABLET, FILM COATED ORAL at 08:16

## 2021-02-18 RX ADMIN — MIDODRINE HYDROCHLORIDE 10 MG: 5 TABLET ORAL at 15:26

## 2021-02-18 RX ADMIN — APIXABAN 2.5 MG: 2.5 TABLET, FILM COATED ORAL at 08:16

## 2021-02-18 RX ADMIN — METOPROLOL TARTRATE 25 MG: 25 TABLET, FILM COATED ORAL at 08:16

## 2021-02-18 RX ADMIN — Medication 20 MG: at 02:14

## 2021-02-18 RX ADMIN — MIDODRINE HYDROCHLORIDE 10 MG: 5 TABLET ORAL at 08:16

## 2021-02-18 RX ADMIN — INSULIN LISPRO 4 UNITS: 100 INJECTION, SOLUTION INTRAVENOUS; SUBCUTANEOUS at 10:20

## 2021-02-18 RX ADMIN — ASPIRIN 81 MG CHEWABLE TABLET 81 MG: 81 TABLET CHEWABLE at 08:15

## 2021-02-18 RX ADMIN — DESMOPRESSIN ACETATE 40 MG: 0.2 TABLET ORAL at 21:43

## 2021-02-18 RX ADMIN — INSULIN LISPRO 2 UNITS: 100 INJECTION, SOLUTION INTRAVENOUS; SUBCUTANEOUS at 17:27

## 2021-02-18 NOTE — OCCUPATIONAL THERAPY NOTE
Occupational Therapy Evaluation     Patient Name: Abena Matute  SASJR'H Date: 2/18/2021  Problem List  Principal Problem:    Acute respiratory failure with hypoxia and hypercarbia (HCC)  Active Problems:    Essential hypertension    Hyperlipidemia    Hypokalemia    History of aortic valve replacement with bioprosthetic valve    Acute metabolic encephalopathy    Dysphagia    Morbid obesity     Depression    Atrial fibrillation (HCC)    Acute kidney injury superimposed on CKD (HCC)    Anemia    Hypernatremia    Seizure (HCC)    History of stroke    Pneumonia due to Escherichia coli ([address] )    Diarrhea of presumed infectious origin    Lower extremity edema    Past Medical History  Past Medical History:   Diagnosis Date    Allergic rhinitis     last assessed 9/12/12    Finger fracture, right     Closed fx of the middle phalanx of the right 5th finger  last assessed 1/30/14    GI bleed 2/22/2019    Hemorrhoids     last assessed 2/10/14    Hyperlipidemia     Hypertension     Stroke [facility])      Past Surgical History  Past Surgical History:   Procedure Laterality Date    AORTIC VALVE REPLACEMENT  07/30/2014    [facility]    CARDIAC CATHETERIZATION  07/18/2014    SLB left main-normal, circumflex-normal, ramus intermedius-normal, RCA was large and dominant giving rise to the PDA & a large posterolateral branch  No disease  last assessed 8/19/14     COLONOSCOPY N/A 2/25/2019    Procedure: COLONOSCOPY;  Surgeon: Malia Alexis DO;  Location: [facility]; Service: Gastroenterology    ESOPHAGOGASTRODUODENOSCOPY N/A 2/22/2019    Procedure: ESOPHAGOGASTRODUODENOSCOPY (EGD)-roadshow overnight;  Surgeon: Malia Alexis DO;  [facility]; Service: Gastroenterology    ESOPHAGOGASTRODUODENOSCOPY N/A 2/23/2019    Procedure: ESOPHAGOGASTRODUODENOSCOPY (EGD); Surgeon: Rhonda Dsouza MD;  Location: [facility];   Service: Gastroenterology   [facility] ESOPHAGOGASTRODUODENOSCOPY N/A 2/25/2019    Procedure: ESOPHAGOGASTRODUODENOSCOPY (EGD); Surgeon: Jono Medrano DO;  [facility]: [facility]; Service: Gastroenterology    IR NON-TUNNELED CENTRAL LINE PLACEMENT  3/1/2019    IR NON-TUNNELED CENTRAL LINE PLACEMENT  2/4/2019    IR TUNNELED DIALYSIS CATHETER CHECK/CHANGE/REPOSITION/ANGIOPLASTY  4/18/2019    IR TUNNELED DIALYSIS CATHETER PLACEMENT  2/4/2019    IR TUNNELED DIALYSIS CATHETER PLACEMENT  2/18/2019    KNEE ARTHROSCOPY      KNEE CARTILAGE SURGERY Left     medial meniscus tear     TESTICLE SURGERY      TONSILLECTOMY AND ADENOIDECTOMY      TOOTH EXTRACTION             02/18/21 1420   OT Last Visit   OT Visit Date 02/18/21   Note Type   Note type Evaluation   Restrictions/Precautions   Weight Bearing Precautions Per Order No   Other Precautions Cognitive; Chair Alarm; Bed Alarm;Contact/isolation   Pain Assessment   Pain Assessment Tool 0-10   Pain Location/Orientation Orientation: Left; Location: Knee   Pain Rating: FLACC (Rest) - Face 0   Pain Rating: FLACC (Rest) - Legs 0   Pain Rating: FLACC (Rest) - Activity 0   Pain Rating: FLACC (Rest) - Cry 0   Pain Rating: FLACC (Rest) - Consolability 0   Score: FLACC (Rest) 0   Pain Rating: FLACC (Activity) - Face 1   Pain Rating: FLACC (Activity) - Legs 1   Pain Rating: FLACC (Activity) - Activity 0   Pain Rating: FLACC (Activity) - Cry 1   Pain Rating: FLACC (Activity) - Consolability 1   Score: FLACC (Activity) 4   Home Living   Type of Home SNF  (Pt resides at [address] [place] [address])   Additional Comments Pt is bedbound at baseline   Prior Function   Lives With Facility staff   Receives Help From Personal care attendant   ADL Assistance Needs assistance   IADLs Needs assistance   Comments Per chart review pt is bedbound at [facility], Pt is able to feed himself and assist with some grooming tasks   Lifestyle   Autonomy PTA pt living at [facility], pt is bedbound at baseline and requires (A) for ADLs and IADLs, is able to feed himself and is continent of bowel and bladder   Reciprocal Relationships supportive wife   Service to [employer] retired, working for [employer] reports liking trains, especially riding on trains   Subjective   Subjective "I broke out of [address] [employer], don't tell them"   ADL   Eating Assistance 3  Moderate Assistance   Eating Deficit Bringing food to mouth assist;Increased time to complete;Supervision/safety;Verbal cueing   Grooming Assistance 3  Moderate Assistance   Grooming Deficit Increased time to complete;Supervision/safety;Verbal cueing   UB Bathing Assistance 2  Maximal Assistance   LB Bathing Assistance 1  Total Assistance   UB Dressing Assistance 2  Maximal Assistance   LB Dressing Assistance 1  Total Assistance   Toileting Assistance  1  Total Assistance   Bed Mobility   Rolling R 1  Dependent   Additional items Assist x 2  (unable to complete roll, only 1/4 roll )   Rolling L 1  Dependent   Additional items Assist x 2  (unable to complete only 1/4 roll)   Transfers   Sit to Stand Unable to assess   Activity Tolerance   Medical Staff Made Aware PT YUDITH Dumont   RUTIMOTHY Assessment   RUE Assessment X  (limited AROM and coordination, unable to reach for grooming )   LUE Assessment   LUE Assessment X  (limited AROM and coordination, unable to reach for grooming )   Hand Function   Gross Motor Coordination Impaired   Fine Motor Coordination Impaired   Vision - Complex Assessment   Additional Comments unable to track therapist around room, limited left attention   Perception   Inattention/Neglect Cues to attend left visual field   Cognition   Overall Cognitive Status Impaired   Arousal/Participation Alert; Cooperative;Persistent stimuli required   Attention Attends with cues to redirect   Orientation Level Oriented to person;Oriented to place; Disoriented to time;Disoriented to situation   Memory Decreased recall of precautions;Decreased recall of recent events;Decreased short term memory;Decreased recall of biographical information;Decreased long term memory   Following Commands Follows one step commands inconsistently   Comments Pt with decreased attention and decreased problem solving, pt able to make jokes with therapist, limited attention   Assessment   Limitation Decreased ADL status; Decreased UE ROM; Decreased UE strength;Decreased Safe judgement during ADL;Decreased cognition;Decreased endurance;Decreased fine motor control;Decreased self-care trans;Decreased high-level ADLs   Prognosis Fair   Assessment Patient is a 63 y o  male admitted to [address] [place] [address] on 1/17/2021 due to Acute respiratory failure with hypoxia and hypercarbia ([facility] [address] )  Pt was brought to [address] [place] [address] ED on 1/17 due to respiratory distress, pt was emergently intubated and [facility], extubated on 1/18  Hx of seizure in hospital and re-intubated on 2/1, and re-extubated on 2/16  Comorbidities affecting pt's physical performance at time of assessment include hx stroke, acute metabolic encephalopathy, HTN, HLD, dysphagia, a fib, PATRICK, hx COVID  Patient has active OT orders  PTA pt living at [facility], pt is bedbound at baseline and requires (A) for ADLs and IADLs, is able to feed himself and is continent of bowel and bladder  Personal factors affecting pt at time of IE include:limited insight into deficits  At the time of evaluation patient currently requires max A for UB ADLs, total A for LB ADLs, total Ax3 for functional transfers, and does not perform functional mobility at baseline  The following deficits affected patient's occupational performance weakness, decreased functional strength, decreased functional balance, decreased activity tolerance, decreased safety awareness, impaired [address], impaired [address], impaired attention, impaired initiation, impaired memory, impaired sequencing, impaired problem solving, impaired interpersonal skills and decreased coping skills   Patient would benefit from skilled OT services while in the hospital to address above deficits  Occupational performance areas to be addressed include ADL retraining, bed mobility, endurance training, activity engagement and activity tolerance in order to maximize patient's level of function  The patient's raw score on the AM-PAC Daily Activity inpatient short form is 12, standardized score is 21 38, less than 39 4  Patients at this level are likely to benefit from DC to post-acute rehabilitation services  From OT standpoint pt has required assistance for ADLs and functional mobility for ~2 years, recommend return to SNF upon D/C  Will continue to follow pt 1-2x/week to address the goals listed below evaluation  Goals   Patient Goals to have some 7 up   LTG Time Frame 10-14   Long Term Goal #1 Pt will complete self-feeding tasks with (S) and AD/AE as needed   Long Term Goal #2 Pt will complete grooming tasks with (S)/set-up   Plan   Treatment Interventions ADL retraining; Endurance training   Goal Expiration Date 03/04/21   OT Treatment Day 0   OT Frequency 1-2x/wk   Recommendation   OT Discharge Recommendation Return to previous environment with social support: return SNF   AM-PAC Daily Activity Inpatient   Lower Body Dressing 1   Bathing 1   Toileting 1   Upper Body Dressing 1   Grooming 2   Eating 2   Daily Activity Raw Score 8   Turning Head Towards Sound 2   Follow Simple Instructions 2   Low Function Daily Activity Raw Score 12   Low Function Daily Activity Standardized Score 21 38   AM-PAC Applied Cognition Inpatient   Following a Speech/Presentation 1   Understanding Ordinary Conversation 1   Taking Medications 1   Remembering Where Things Are Placed or Put Away 1   Remembering List of 4-5 Errands 1   Taking Care of Complicated Tasks 1   Applied Cognition Raw Score 6   Applied Cognition Standardized Score 7 69        At the end of the session, all needs met and pt supine in bed, LEs elevated , HOB elevated and call bell within reach, and repositioned for upright posture       Verlan Doni, OTR/L

## 2021-02-18 NOTE — PROGRESS NOTES
Progress Note - Lucas 1 63 y o  male MRN: [id number]  Unit/Bed#: S -01 Encounter: [id number]        REQUIRED DOCUMENTATION:     1  This service was provided via Telemedicine  2  Provider located at [address] [place] [address]  3  TeleMed provider: Maris Heart MD   4  Identify all parties in room with patient during tele consult:  Dr Marion Card  5  After connecting through [employer], patient was identified by name and date of birth and assistant checked wristband  Patient was then informed that this was a Telemedicine visit and that the exam was being conducted confidentially over secure lines  My office door was closed  The patient was notified the following individuals were present in the room Dr Maria Luz Rodriguez and Dr Delgado Cypriot  Patient acknowledged consent and understanding of privacy and security of the Telemedicine visit, and gave us permission to have the assistant stay in the room in order to assist with the history and to conduct the exam   I informed the patient that I have reviewed their record in Epic and presented the opportunity for them to ask any questions regarding the visit today  The patient agreed to participate  Subjective  Patient is seen after being transferred out of a long ICU / step down unit stay since we last evaluated him  Patient is awake and alert  He is more interactive than I had seen him during my evaluations and interviews  He is aware he is on the hospital receiving medical care  He states he wishes to be discharged, and appropriately displays sadness when reflecting on his long hospital stay and multiple medical problems and requiring ICU level of care during this hospital stay  He states has difficulty sleeping and a decreased appetite, and now presenting diarrhea  He responds appropriately when advised to maintain hydrated and asked for Ginger Ale with ice  He seems depressed but not withdrawn or apathetic    He gets tearful when we talk about his wife and he relays that he wished she called him more, stating he is usually the one that initiates the call  During our interview, patient maintained good eye contact, and maintaned age-appropriate attention and concentration  Patient is not suicidal and is not homicidal  He displays no sign of psychosis, is calm and collected to the best of his ability, and is forward thinking and wishing to speak to his wife on the phone  Behavior over the last 24 hours: improving  Sleep: insomnia  Appetite: decreased from baseline  Medication side effects: none verbalized  ROS: GI upset, diarrhea, bruises on skin, Complete review of systems is negative except as noted above      Mental Status Exam:  Appearance:  alert, good eye contact, appears older than stated age, appropriate grooming and hygiene, overweight and upper extremity bruises   Behavior:  calm, cooperative and laying in bed   Motor: no abnormal movements and gait was not assessed for risk of falling   Speech:  spontaneous, clear, normal rate, normal volume and coherent   Mood:  depressed   Affect:  mood-congruent and tearful at times   Thought Process:  organized, goal directed, normal rate of thoughts   Thought Content: no verbalized delusions or overt paranoia   Perceptual disturbances: no reported hallucinations and does not appear to be responding to internal stimuli at this time   Risk Potential: No active suicidal ideation, No active homicidal ideation, Low potential for aggression based on previous behavior, and does not seem to be responding to internal stimuli   Cognition: oriented to self and situation, memory grossly intact, cognitive deficit, age-appropriate attention span and concentration and cognition not formally tested   Insight:  Limited   Judgment: Limited     Risks, benefits and possible side effects of Medications:   Risks, benefits, and possible side effects of medications have been explained to the patient, who verbalizes understanding  Labs: I have personally reviewed all pertinent laboratory results         Diagnosis:   Depression, moderate, recurrent without psychotic feautures  Adjustment disorder  Cognitive deficit as per history    Recommended Treatment:   Will recommend to continue sertraline 125 mg / d   Patient not showing side effects to current management  Continue with medical management as per primary team  Patient is not suicidal  Patient does not require 1:1  Will continue to update patient's wife    Ursula Reese MD    This note was not shared with the patient due to this is a psychotherapy note

## 2021-02-18 NOTE — PLAN OF CARE
Problem: Prexisting or High Potential for Compromised Skin Integrity  Goal: Skin integrity is maintained or improved  Description: INTERVENTIONS:  - Identify patients at risk for skin breakdown  - Assess and monitor skin integrity  - Assess and monitor nutrition and hydration status  - Monitor labs   - Assess for incontinence   - Turn and reposition patient  - Assist with mobility/ambulation  - Relieve pressure over bony prominences  - Avoid friction and shearing  - Provide appropriate hygiene as needed including keeping skin clean and dry  - Evaluate need for skin moisturizer/barrier cream  - Collaborate with interdisciplinary team   - Patient/family teaching  - Consider wound care consult   Outcome: Progressing     Problem: Potential for Falls  Goal: Patient will remain free of falls  Description: INTERVENTIONS:  - Assess patient frequently for physical needs  -  Identify cognitive and physical deficits and behaviors that affect risk of falls  -  [place] fall precautions as indicated by assessment   - Educate patient/family on patient safety including physical limitations  - Instruct patient to call for assistance with activity based on assessment  - Modify environment to reduce risk of injury  - Consider OT/PT consult to assist with strengthening/mobility  Outcome: Progressing     Problem: Nutrition/Hydration-ADULT  Goal: Nutrient/Hydration intake appropriate for improving, restoring or maintaining nutritional needs  Description: Monitor and assess patient's nutrition/hydration status for malnutrition  Collaborate with interdisciplinary team and initiate plan and interventions as ordered  Monitor patient's weight and dietary intake as ordered or per policy  Utilize nutrition screening tool and intervene as necessary  Determine patient's food preferences and provide high-protein, high-caloric foods as appropriate       INTERVENTIONS:  - Monitor oral intake, urinary output, labs, and treatment plans  - Assess nutrition and hydration status and recommend course of action  - Evaluate amount of meals eaten  - Assist patient with eating if necessary   - Allow adequate time for meals  - Recommend/ encourage appropriate diets, oral nutritional supplements, and vitamin/mineral supplements  - Order, calculate, and assess calorie counts as needed  - Recommend, monitor, and adjust tube feedings and TPN/PPN based on assessed needs  - Assess need for intravenous fluids  - Provide specific nutrition/hydration education as appropriate  - Include patient/family/caregiver in decisions related to nutrition  Outcome: Progressing     Problem: NEUROSENSORY - ADULT  Goal: Achieves stable or improved neurological status  Description: INTERVENTIONS  - Monitor and report changes in neurological status  - Monitor vital signs such as temperature, blood pressure, glucose, and any other labs ordered   - Initiate measures to prevent increased intracranial pressure  - Monitor for seizure activity and implement precautions if appropriate      Outcome: Progressing  Goal: Achieves maximal functionality and self care  Description: INTERVENTIONS  - Monitor swallowing and airway patency with patient fatigue and changes in neurological status  - Encourage and assist patient to increase activity and self care     - Encourage visually impaired, hearing impaired and aphasic patients to use assistive/communication devices  Outcome: Progressing     Problem: CARDIOVASCULAR - ADULT  Goal: Maintains optimal cardiac output and hemodynamic stability  Description: INTERVENTIONS:  - Monitor I/O, vital signs and rhythm  - Monitor for S/S and trends of decreased cardiac output  - Administer and titrate ordered vasoactive medications to optimize hemodynamic stability  - Assess quality of pulses, skin color and temperature  - Assess for signs of decreased coronary artery perfusion  - Instruct patient to report change in severity of symptoms  Outcome: Progressing  Goal: Absence of cardiac dysrhythmias or at baseline rhythm  Description: INTERVENTIONS:  - Continuous cardiac monitoring, vital signs, obtain 12 lead EKG if ordered  - Administer antiarrhythmic and heart rate control medications as ordered  - Monitor electrolytes and administer replacement therapy as ordered  Outcome: Progressing     Problem: RESPIRATORY - ADULT  Goal: Achieves optimal ventilation and oxygenation  Description: INTERVENTIONS:  - Assess for changes in respiratory status  - Assess for changes in mentation and behavior  - Position to facilitate oxygenation and minimize respiratory effort  - Oxygen administered by appropriate delivery if ordered  - Initiate smoking cessation education as indicated  - Encourage broncho-pulmonary hygiene including cough, deep breathe, Incentive Spirometry  - Assess the need for suctioning and aspirate as needed  - Assess and instruct to report SOB or any respiratory difficulty  - Respiratory Therapy support as indicated  Outcome: Progressing     Problem: GENITOURINARY - ADULT  Goal: Maintains or returns to baseline urinary function  Description: INTERVENTIONS:  - Assess urinary function  - Encourage oral fluids to ensure adequate hydration if ordered  - Administer IV fluids as ordered to ensure adequate hydration  - Administer ordered medications as needed  - Offer frequent toileting  - Follow urinary retention protocol if ordered  Outcome: Progressing  Goal: Absence of urinary retention  Description: INTERVENTIONS:  - Assess patients ability to void and empty bladder  - Monitor I/O  - Bladder scan as needed  - Discuss with physician/AP medications to alleviate retention as needed  - Discuss catheterization for long term situations as appropriate  Outcome: Progressing  Goal: Urinary catheter remains patent  Description: INTERVENTIONS:  - Assess patency of urinary catheter  - If patient has a chronic bo, consider changing catheter if non-functioning  - Follow guidelines for intermittent irrigation of non-functioning urinary catheter  Outcome: Progressing     Problem: METABOLIC, FLUID AND ELECTROLYTES - ADULT  Goal: Electrolytes maintained within normal limits  Description: INTERVENTIONS:  - Monitor labs and assess patient for signs and symptoms of electrolyte imbalances  - Administer electrolyte replacement as ordered  - Monitor response to electrolyte replacements, including repeat lab results as appropriate  - Instruct patient on fluid and nutrition as appropriate  Outcome: Progressing  Goal: Fluid balance maintained  Description: INTERVENTIONS:  - Monitor labs   - Monitor I/O and WT  - Instruct patient on fluid and nutrition as appropriate  - Assess for signs & symptoms of volume excess or deficit  Outcome: Progressing  Goal: Glucose maintained within target range  Description: INTERVENTIONS:  - Monitor Blood Glucose as ordered  - Assess for signs and symptoms of hyperglycemia and hypoglycemia  - Administer ordered medications to maintain glucose within target range  - Assess nutritional intake and initiate nutrition service referral as needed  Outcome: Progressing     Problem: MUSCULOSKELETAL - ADULT  Goal: Maintain or return mobility to safest level of function  Description: INTERVENTIONS:  - Assess patient's ability to carry out ADLs; assess patient's baseline for ADL function and identify physical deficits which impact ability to perform ADLs (bathing, care of mouth/teeth, toileting, grooming, dressing, etc )  - Assess/evaluate cause of self-care deficits   - Assess range of motion  - Assess patient's mobility  - Assess patient's need for assistive devices and provide as appropriate  - Encourage maximum independence but intervene and supervise when necessary  - Involve family in performance of ADLs  - Assess for home care needs following discharge   - Consider OT consult to assist with ADL evaluation and planning for discharge  - Provide patient education as appropriate  Outcome: Progressing  Goal: Maintain proper alignment of affected body part  Description: INTERVENTIONS:  - Support, maintain and protect limb and body alignment  - Provide patient/ family with appropriate education  Outcome: Progressing     Problem: INFECTION - ADULT  Goal: Absence or prevention of progression during hospitalization  Description: INTERVENTIONS:  - Assess and monitor for signs and symptoms of infection  - Monitor lab/diagnostic results  - Monitor all insertion sites, i e  indwelling lines, tubes, and drains  - Monitor endotracheal if appropriate and nasal secretions for changes in amount and color  - [place] appropriate cooling/warming therapies per order  - Administer medications as ordered  - Instruct and encourage patient and family to use good hand hygiene technique  - Identify and instruct in appropriate isolation precautions for identified infection/condition  Outcome: Progressing     Problem: DISCHARGE PLANNING  Goal: Discharge to home or other facility with appropriate resources  Description: INTERVENTIONS:  - Identify barriers to discharge w/patient and caregiver  - Arrange for needed discharge resources and transportation as appropriate  - Identify discharge learning needs (meds, wound care, etc )  - Arrange for interpretive services to assist at discharge as needed  - Refer to Case Management Department for coordinating discharge planning if the patient needs post-hospital services based on physician/advanced practitioner order or complex needs related to functional status, cognitive ability, or social support system  Outcome: Progressing     Problem: Knowledge Deficit  Goal: Patient/family/caregiver demonstrates understanding of disease process, treatment plan, medications, and discharge instructions  Description: Complete learning assessment and assess knowledge base    Interventions:  - Provide teaching at level of understanding  - Provide teaching via preferred learning methods  Outcome: Progressing     Problem: SAFETY,RESTRAINT: NV/NON-SELF DESTRUCTIVE BEHAVIOR  Goal: Remains free of harm/injury (restraint for non violent/non self-detsructive behavior)  Description: INTERVENTIONS:  - Instruct patient/family regarding restraint use   - Assess and monitor physiologic and psychological status   - Provide interventions and comfort measures to meet assessed patient needs   - Identify and implement measures to help patient regain control  - Assess readiness for release of restraint   Outcome: Progressing  Goal: Returns to optimal restraint-free functioning  Description: INTERVENTIONS:  - Assess the patient's behavior and symptoms that indicate continued need for restraint  - Identify and implement measures to help patient regain control  - Assess readiness for release of restraint   Outcome: Progressing

## 2021-02-18 NOTE — PLAN OF CARE
Problem: OCCUPATIONAL THERAPY ADULT  Goal: Performs self-care activities at highest level of function for planned discharge setting  See evaluation for individualized goals  Description: Treatment Interventions: ADL retraining, Endurance training          See flowsheet documentation for full assessment, interventions and recommendations  Note: Limitation: Decreased ADL status, Decreased UE ROM, Decreased UE strength, Decreased Safe judgement during ADL, Decreased cognition, Decreased endurance, Decreased fine motor control, Decreased self-care trans, Decreased high-level ADLs  Prognosis: Fair  Assessment: Patient is a 63 y o  male admitted to [facility] on 1/17/2021 due to Acute respiratory failure with hypoxia and hypercarbia ([facility] [address] )  Pt was brought to [facility] ED on 1/17 due to respiratory distress, pt was emergently intubated and brought to [facility], extubated on 1/18  Hx of seizure in hospital and re-intubated on 2/1, and re-extubated on 2/16  Comorbidities affecting pt's physical performance at time of assessment include hx stroke, acute metabolic encephalopathy, HTN, HLD, dysphagia, a fib, PATRICK, hx COVID  Patient has active OT orders  PTA pt living at [facility], pt is bedbound at baseline and requires (A) for ADLs and IADLs, is able to feed himself and is continent of bowel and bladder  Personal factors affecting pt at time of IE include:limited insight into deficits  At the time of evaluation patient currently requires max A for UB ADLs, total A for LB ADLs, total Ax3 for functional transfers, and does not perform functional mobility at baseline   The following deficits affected patient's occupational performance weakness, decreased functional strength, decreased functional balance, decreased activity tolerance, decreased safety awareness, impaired [facility] [address], impaired [facility], impaired attention, impaired initiation, impaired memory, impaired sequencing, impaired problem solving, impaired interpersonal skills and decreased coping skills  Patient would benefit from skilled OT services while in the hospital to address above deficits  Occupational performance areas to be addressed include ADL retraining, bed mobility, endurance training, activity engagement and activity tolerance in order to maximize patient's level of function  The patient's raw score on the AM-PAC Daily Activity inpatient short form is 12, standardized score is 21 38, less than 39 4  Patients at this level are likely to benefit from DC to post-acute rehabilitation services  From OT standpoint pt has required assistance for ADLs and functional mobility for ~2 years, recommend return to SNF upon D/C  Will continue to follow pt 1-2x/week to address the goals listed below evaluation        OT Discharge Recommendation: Return to previous environment with social support

## 2021-02-18 NOTE — PHYSICAL THERAPY NOTE
PHYSICAL THERAPY EVALUATION NOTE    Patient Name: Janell WU Date: 2021     AGE:   63 y o  Mrn:   [id number]  ADMIT DX:  [id number]-[id number] [U07 1]    Past Medical History:   Diagnosis Date    Allergic rhinitis     last assessed 12    Finger fracture, right     Closed fx of the middle phalanx of the right 5th finger  last assessed 14    GI bleed 2019    Hemorrhoids     last assessed 2/10/14    Hyperlipidemia     Hypertension     Stroke [facility])      Length Of Stay: 28  PHYSICAL THERAPY EVALUATION :   Patient's identity confirmed via 2 patient identifiers (full name and ) at start of session       21 1421   PT Last Visit   PT Visit Date 21   Note Type   Note type Evaluation   Pain Assessment   Pain Location/Orientation Orientation: Left; Location: Knee   Pain Rating: FLACC (Rest) - Face 0   Pain Rating: FLACC (Rest) - Legs 0   Pain Rating: FLACC (Rest) - Activity 0   Pain Rating: FLACC (Rest) - Cry 0   Pain Rating: FLACC (Rest) - Consolability 0   Score: FLACC (Rest) 0   Pain Rating: FLACC (Activity) - Face 1   Pain Rating: FLACC (Activity) - Legs 1   Pain Rating: FLACC (Activity) - Activity 0   Pain Rating: FLACC (Activity) - Cry 1   Pain Rating: FLACC (Activity) - Consolability 1   Score: FLACC (Activity) 4   Home Living   Type of Home   (Pt was a resident at [facility])   Additional Comments Per pt and chart review, pt is bedbound at baseline, requires a hoyerlift for OOB   Prior Function   Lives With Facility staff   Receives Help From Personal care attendant   ADL Assistance Needs assistance   IADLs Needs assistance   Falls in the last 6 months 0   Restrictions/Precautions   Weight Bearing Precautions Per Order No   Other Precautions Contact/isolation;Cognitive; Bed Alarm;Telemetry;O2;Pain   General   Additional Pertinent History Pt on 6L NC O2   When feeding w/ OT, SpO2 decreases to 84-87%  YUDITH Trejo aware  Afterwards sating 90-93% during session   Cognition   Overall Cognitive Status Impaired   Arousal/Participation Alert   Attention Attends with cues to redirect   Orientation Level Oriented to person;Oriented to place; Disoriented to time;Disoriented to situation   Memory Decreased recall of precautions;Decreased recall of recent events;Decreased short term memory;Decreased recall of biographical information;Decreased long term memory   Following Commands Follows one step commands inconsistently   Comments Pt supine in bed upon arrival  He makes jokes w/ therapists, is easily distracted  RLE Assessment   RLE Assessment X   Strength RLE   RLE Overall Strength 1/5   LLE Assessment   LLE Assessment X   Strength LLE   LLE Overall Strength 1/5   Coordination   Sensation X   Light Touch   RLE Light Touch Impaired   LLE Light Touch Impaired   Bed Mobility   Rolling R 1  Dependent  (Partial roll, limited d/t c/o L knee pain)   Additional items Assist x 2   Rolling L 1  Dependent  (Partial roll, limited d/t c/o L knee pain)   Additional items Assist x 2; Increased time required;Verbal cues;LE management   Additional items Assist x 2  (long sit (attempted))   Additional Comments Pt unable to tolerate further rolling due to c/o R shoulder or L knee pain  Attempted partial pull to long sit, able to lift back off bed w/ total A x 2 but unable to complete more   Transfers   Sit to Stand Unable to assess  (Not appropriate, elo lift at baseline)   Balance   Static Sitting Zero   Activity Tolerance   Activity Tolerance Patient limited by fatigue   Medical Staff [place] coordination w/ OT Ophelia Moreno to [facility] Team (Dr Kenji Rob)   Nurse Emir Ceja to YUDITH Trejo who reports pt is appropriate to participate in PT evaluation   Assessment   Prognosis Fair   Problem List Decreased strength;Decreased range of motion;Decreased endurance;Decreased mobility; Decreased coordination; Impaired balance;Decreased cognition;Obesity;Pain   Assessment Alfredo Crawford is a 63 y o  Male who [facility] on 1/17/2021 from [facility] w/ c/o fever, AMS, respiratory distress and diagnosis of COVID-19  pt was emergently intubated and brought to [facility], extubated on 1/18  Hx of seizure in hospital and re-intubated on 2/1, and re-extubated on 2/16  Orders for PT eval and treat received  Pt presents w/ comorbidities of HTN, HLD, morbid obesity, seizures, hx of CVA, previous hx of intubation (3/2019), Afib, and personal factors including: elo lift at baseline, inability to perform ADLs and IADLs, decreased cognition  At baseline pt is dependent for all bed mobility and a elo lift for transfers  Upon evaluation, pt presents w/ the following deficits: weakness, decreased ROM, edema of extremities and impaired balance  Pt is total A x 2 to partial roll, limited due to pain  Pt's clinical presentation is unstable/unpredictable due to new COVID-19, intubation x 2 during admission, depression, decreased cognition  As pt's baseline is dependent for bed mobility and requires a elo lift for OOB, pt is at his current baseline  No acute PT needs at this time, will D/C from PT caseload  Discharge recommendation is for pt to return to SNF upon DC     Goals   Patient Goals To have 7-Up   Recommendation   PT Discharge Recommendation Return to previous environment with social support  (Return to SNF)   Equipment Recommended   (Samia Angry for OOB)   [place] 8 in Bed Without Bedrails 1   Lying on Back to Sitting on Edge of Flat Bed 1   Moving Bed to Chair 1   Standing Up From Chair 1   Walk in Room 1   Climb 3-5 Stairs 1   Basic Mobility Inpatient Raw Score 6   Turning Head Towards Sound 2   Follow Simple Instructions 2   Low Function Basic Mobility Raw Score 10   Low Function Basic Mobility Standardized Score 14 65   Barthel Index   Feeding 5   Bathing 0   Grooming Score 0   Dressing Score 0   Bladder Score 0   Bowels Score 0   Toilet Use Score 0   Transfers (Bed/Chair) Score 0   Mobility (Level Surface) Score 0   Stairs Score 0   Barthel Index Score 5     Given the above findings from this evaluation, at this time this patient does not require skilled inpatient PT  Will D/C patient from PT caseload  Can reconsult if any needs arise      Arben Guerrero PT

## 2021-02-18 NOTE — SPEECH THERAPY NOTE
Speech Language/Pathology    Speech/Language Pathology Progress Note    Patient Name: Abena Matute  UHBMO'O Date: 2/18/2021       Subjective:  Pt seen for dysphagia tx at lunch  Pt awake and alert, trying to self feed, but having sig difficulty, spilled thick liquids  Objective:  Pt fed ~50% of puree by tsp, refused magic cup, stated he doesn't like vanilla or strawberry, only chocolate  Pt drank 12 oz cup of NTL gingerale by straw w/ good oral control  Took successive sips  Swallows appeared timely and complete w/ no overt s/s aspiration  Voice stronger and clear  Assessment:  Pt tolerating dysphagia puree w/ NTL by straw  Overall status is improved  Plan/Recommendations:  Cont present diet, will assess w/ trial of dysphagia 2 diet w/ NTL tomorrow, as able       Desiree Churchill, [address] [place] CCC-SLP  Speech Pathologist  Available via  tiger text

## 2021-02-18 NOTE — PROGRESS NOTES
Patient is a 54-year-old male with a past medical history of  CVA, hypertension, hyperlipidemia, Afib cardiomyopathy was admitted for CWDQV-96  And was complicated for prolonged hospital stay in the ICU and requiring intubation  Pneumonia due to COVID-19 virus  Former smoker smoked 1 pack a day for 25 years, but quit 26 years ago, does not drink alcohol or illegal drugs  Acute respiratory failure with hypoxia and hypercarbia  covid-19 with superimposed ESBL e  Coli PNA  Intubated 2/1-2/16  Extubated 2/16  Off decadron 2/15   sputum culture 2/7 grew E coli ESBL   blood culture negative for growth for 5 days      Plan:   · Continue [address] at [facility], titrate to maintain SPO2 >88%  · 2/16 Completed Ertapenem day 10/10  for superimposed ESBL E choli bacterial pneumonia  · BiPAP 16/8 at night and naps    Possible ANGELINA/ OHS undiagnosed  · BiPAP 16/8 at night and naps       COVID pneumonia  Confirmed positive 1/17  Not candidate for remdesivir  Actemra given 1/28  Conv Plasma 1/17  Completed steroids on 02/15  VC/Zinc completed  Plan:  · Continue MV  · Continue statin per protocol  · Continue pepcid  Per protocol  ·  patient on Eliquis 2 5 mg b i d      Plan discussed with SLIM    Subjective/Objective       Subjective:  Patient was apologetic this morning for being garg, denies any shortness of breath or difficulty breathing    Objective:    Vitals: Blood pressure 119/59, pulse 101, temperature 98 7 °F (37 1 °C), temperature source Axillary, resp  rate 20, height 5' 11" (1 803 m), weight (!) 163 kg (359 lb 12 7 oz), SpO2 93 %  ,Body mass index is 50 18 kg/m²        Intake/Output Summary (Last 24 hours) at 2/18/2021 1109  Last data filed at 2/17/2021 1314  Gross per 24 hour   Intake 120 ml   Output 200 ml   Net -80 ml       Invasive Devices     Peripheral Intravenous Line            Long-Dwell Peripheral IV (Midline) 71/09/05 Left Cephalic 23 days          Drain            Urethral Catheter Straight-tip 16 Fr  31 days    Rectal Tube With balloon 5 days                Physical Exam:  Physical Exam  Vitals signs reviewed  Constitutional:       Appearance: Normal appearance  HENT:      Head: Normocephalic  Nose: Nose normal       Mouth/Throat:      Mouth: Mucous membranes are moist    Eyes:      Extraocular Movements: Extraocular movements intact  Conjunctiva/sclera: Conjunctivae normal       Pupils: Pupils are equal, round, and reactive to light  Cardiovascular:      Rate and Rhythm: Normal rate and regular rhythm  Pulses: Normal pulses  Heart sounds: Normal heart sounds  Pulmonary:      Effort: Pulmonary effort is normal       Comments: Diminished lung sounds  Abdominal:      General: Abdomen is flat  Bowel sounds are normal    Genitourinary:     Comments: Patient has a Turner catheter and a rectal tube  Musculoskeletal:      Right lower leg: Edema (Plus four edema in the legs) present  Left lower leg: Edema (Plus four edema on the legs) present  Skin:     General: Skin is warm and dry  Neurological:      General: No focal deficit present  Mental Status: He is alert  Comments: Patient alert to person, place, time         Lab, Imaging and other studies: I have personally reviewed pertinent reports

## 2021-02-19 LAB
ANION GAP SERPL CALCULATED.3IONS-SCNC: 11 MMOL/L (ref 4–13)
BASOPHILS # BLD AUTO: 0.04 THOUSANDS/ΜL (ref 0–0.1)
BASOPHILS NFR BLD AUTO: 1 % (ref 0–1)
BUN SERPL-MCNC: 41 MG/DL (ref 5–25)
CALCIUM SERPL-MCNC: 9 MG/DL (ref 8.3–10.1)
CHLORIDE SERPL-SCNC: 111 MMOL/L (ref 100–108)
CO2 SERPL-SCNC: 26 MMOL/L (ref 21–32)
CREAT SERPL-MCNC: 1.3 MG/DL (ref 0.6–1.3)
EOSINOPHIL # BLD AUTO: 0.78 THOUSAND/ΜL (ref 0–0.61)
EOSINOPHIL NFR BLD AUTO: 10 % (ref 0–6)
ERYTHROCYTE [DISTWIDTH] IN BLOOD BY AUTOMATED COUNT: 25.9 % (ref 11.6–15.1)
GFR SERPL CREATININE-BSD FRML MDRD: 59 ML/MIN/1.73SQ M
GLUCOSE SERPL-MCNC: 100 MG/DL (ref 65–140)
GLUCOSE SERPL-MCNC: 116 MG/DL (ref 65–140)
GLUCOSE SERPL-MCNC: 158 MG/DL (ref 65–140)
GLUCOSE SERPL-MCNC: 167 MG/DL (ref 65–140)
GLUCOSE SERPL-MCNC: 99 MG/DL (ref 65–140)
HCT VFR BLD AUTO: 31 % (ref 36.5–49.3)
HGB BLD-MCNC: 8.7 G/DL (ref 12–17)
IMM GRANULOCYTES # BLD AUTO: 0.17 THOUSAND/UL (ref 0–0.2)
IMM GRANULOCYTES NFR BLD AUTO: 2 % (ref 0–2)
LYMPHOCYTES # BLD AUTO: 0.51 THOUSANDS/ΜL (ref 0.6–4.47)
LYMPHOCYTES NFR BLD AUTO: 7 % (ref 14–44)
MCH RBC QN AUTO: 24.6 PG (ref 26.8–34.3)
MCHC RBC AUTO-ENTMCNC: 28.1 G/DL (ref 31.4–37.4)
MCV RBC AUTO: 88 FL (ref 82–98)
MONOCYTES # BLD AUTO: 0.66 THOUSAND/ΜL (ref 0.17–1.22)
MONOCYTES NFR BLD AUTO: 9 % (ref 4–12)
NEUTROPHILS # BLD AUTO: 5.43 THOUSANDS/ΜL (ref 1.85–7.62)
NEUTS SEG NFR BLD AUTO: 71 % (ref 43–75)
NRBC BLD AUTO-RTO: 0 /100 WBCS
PLATELET # BLD AUTO: 168 THOUSANDS/UL (ref 149–390)
PMV BLD AUTO: 11.6 FL (ref 8.9–12.7)
POTASSIUM SERPL-SCNC: 3 MMOL/L (ref 3.5–5.3)
RBC # BLD AUTO: 3.54 MILLION/UL (ref 3.88–5.62)
SODIUM SERPL-SCNC: 148 MMOL/L (ref 136–145)
WBC # BLD AUTO: 7.59 THOUSAND/UL (ref 4.31–10.16)

## 2021-02-19 PROCEDURE — 85025 COMPLETE CBC W/AUTO DIFF WBC: CPT | Performed by: FAMILY MEDICINE

## 2021-02-19 PROCEDURE — 82948 REAGENT STRIP/BLOOD GLUCOSE: CPT

## 2021-02-19 PROCEDURE — 99232 SBSQ HOSP IP/OBS MODERATE 35: CPT | Performed by: INTERNAL MEDICINE

## 2021-02-19 PROCEDURE — 99232 SBSQ HOSP IP/OBS MODERATE 35: CPT | Performed by: FAMILY MEDICINE

## 2021-02-19 PROCEDURE — 80048 BASIC METABOLIC PNL TOTAL CA: CPT | Performed by: FAMILY MEDICINE

## 2021-02-19 PROCEDURE — 92526 ORAL FUNCTION THERAPY: CPT

## 2021-02-19 PROCEDURE — 94762 N-INVAS EAR/PLS OXIMTRY CONT: CPT

## 2021-02-19 PROCEDURE — 93005 ELECTROCARDIOGRAM TRACING: CPT

## 2021-02-19 PROCEDURE — 94660 CPAP INITIATION&MGMT: CPT

## 2021-02-19 PROCEDURE — 94760 N-INVAS EAR/PLS OXIMETRY 1: CPT

## 2021-02-19 RX ORDER — BUMETANIDE 1 MG/1
2 TABLET ORAL DAILY
Status: DISCONTINUED | OUTPATIENT
Start: 2021-02-20 | End: 2021-02-20

## 2021-02-19 RX ORDER — POTASSIUM CHLORIDE 14.9 MG/ML
20 INJECTION INTRAVENOUS ONCE
Status: COMPLETED | OUTPATIENT
Start: 2021-02-19 | End: 2021-02-19

## 2021-02-19 RX ORDER — ALBUMIN (HUMAN) 12.5 G/50ML
25 SOLUTION INTRAVENOUS ONCE
Status: COMPLETED | OUTPATIENT
Start: 2021-02-19 | End: 2021-02-19

## 2021-02-19 RX ORDER — BUMETANIDE 1 MG/1
2 TABLET ORAL ONCE
Status: DISCONTINUED | OUTPATIENT
Start: 2021-02-19 | End: 2021-02-20

## 2021-02-19 RX ORDER — POTASSIUM CHLORIDE 20 MEQ/1
40 TABLET, EXTENDED RELEASE ORAL 2 TIMES DAILY
Status: DISCONTINUED | OUTPATIENT
Start: 2021-02-19 | End: 2021-02-20

## 2021-02-19 RX ADMIN — POTASSIUM CHLORIDE 40 MEQ: 1500 TABLET, EXTENDED RELEASE ORAL at 08:57

## 2021-02-19 RX ADMIN — INSULIN LISPRO 2 UNITS: 100 INJECTION, SOLUTION INTRAVENOUS; SUBCUTANEOUS at 08:58

## 2021-02-19 RX ADMIN — LEVETIRACETAM 750 MG: 500 TABLET, FILM COATED ORAL at 08:57

## 2021-02-19 RX ADMIN — ALBUMIN (HUMAN) 25 G: 0.25 INJECTION, SOLUTION INTRAVENOUS at 22:50

## 2021-02-19 RX ADMIN — ASPIRIN 81 MG CHEWABLE TABLET 81 MG: 81 TABLET CHEWABLE at 08:57

## 2021-02-19 RX ADMIN — ACETAMINOPHEN 650 MG: 325 TABLET, FILM COATED ORAL at 08:57

## 2021-02-19 RX ADMIN — POTASSIUM CHLORIDE 40 MEQ: 1500 TABLET, EXTENDED RELEASE ORAL at 18:36

## 2021-02-19 RX ADMIN — POTASSIUM CHLORIDE 20 MEQ: 14.9 INJECTION, SOLUTION INTRAVENOUS at 09:13

## 2021-02-19 RX ADMIN — BUMETANIDE 2 MG: 1 TABLET ORAL at 08:57

## 2021-02-19 RX ADMIN — SERTRALINE 125 MG: 25 TABLET, FILM COATED ORAL at 08:57

## 2021-02-19 RX ADMIN — APIXABAN 2.5 MG: 2.5 TABLET, FILM COATED ORAL at 08:57

## 2021-02-19 RX ADMIN — MIDODRINE HYDROCHLORIDE 10 MG: 5 TABLET ORAL at 14:25

## 2021-02-19 RX ADMIN — METOPROLOL TARTRATE 25 MG: 25 TABLET, FILM COATED ORAL at 08:58

## 2021-02-19 RX ADMIN — Medication 1 TABLET: at 08:57

## 2021-02-19 RX ADMIN — Medication 20 MG: at 14:26

## 2021-02-19 RX ADMIN — Medication 2000 UNITS: at 08:57

## 2021-02-19 RX ADMIN — MIDODRINE HYDROCHLORIDE 10 MG: 5 TABLET ORAL at 08:57

## 2021-02-19 RX ADMIN — APIXABAN 2.5 MG: 2.5 TABLET, FILM COATED ORAL at 18:35

## 2021-02-19 NOTE — ASSESSMENT & PLAN NOTE
· Hold home cardizem in setting of hypotension  · Continue metoprolol 25mg BID for rate control  · BP's stable on Midodrine TID  · 2/19 overnight: 80/50, refused PO midodrine  Received albumin x1 with improvement

## 2021-02-19 NOTE — ASSESSMENT & PLAN NOTE
History of stroke 1 year ago  Now baseline resident at [facility]  Plan  · Continue Eliquis daily  · Continue ASA daily    ? Eliquis daily  ?  Asa daily

## 2021-02-19 NOTE — ASSESSMENT & PLAN NOTE
Present for multiple days prior to transfer to [address] [place] [address]  Bowel regimen held 3 days ago and diarrhea persisted  C  Diff negative

## 2021-02-19 NOTE — CASE MANAGEMENT
CM sent BiPap prescription, Pulm note, and all Behavioral Health notes to Dania Son in Admissions at [facility] via [employer] per her request

## 2021-02-19 NOTE — ASSESSMENT & PLAN NOTE
POA acute resp failure with hypoxia and hypercarbia secondary to covid-19 with superimposed ESBL e  Coli PNA  Patient with complicated trajectory originally brought from [facility] ED on 1/17 due to respiratory distress after undergoing emergent intubation  Pt transfered to [facility] and successfully extubated on 1/18  Unfortunately, patient suffered a during this admission likely secondary to existing CVA and was re-intubated on 2/1 and re-extubated on 2/16      Plan:   · Currently on 5L NC satting 93-95%, titrate to maintain SPO2 >88%  · Pulm following- overnight pulse ox and morning ABG 24 hours prior to discharge (only if d/c to private home)  · Continue BiPAP q h s  and for naps  · Will need formal sleep study as an outpatient

## 2021-02-19 NOTE — PLAN OF CARE
Problem: Prexisting or High Potential for Compromised Skin Integrity  Goal: Skin integrity is maintained or improved  Description: INTERVENTIONS:  - Identify patients at risk for skin breakdown  - Assess and monitor skin integrity  - Assess and monitor nutrition and hydration status  - Monitor labs   - Assess for incontinence   - Turn and reposition patient  - Assist with mobility/ambulation  - Relieve pressure over bony prominences  - Avoid friction and shearing  - Provide appropriate hygiene as needed including keeping skin clean and dry  - Evaluate need for skin moisturizer/barrier cream  - Collaborate with interdisciplinary team   - Patient/family teaching  - Consider wound care consult   Outcome: Progressing     Problem: Potential for Falls  Goal: Patient will remain free of falls  Description: INTERVENTIONS:  - Assess patient frequently for physical needs  -  Identify cognitive and physical deficits and behaviors that affect risk of falls    -  [place] fall precautions as indicated by assessment   - Educate patient/family on patient safety including physical limitations  - Instruct patient to call for assistance with activity based on assessment  - Modify environment to reduce risk of injury  - Consider OT/PT consult to assist with strengthening/mobility  Outcome: Progressing     Problem: SAFETY,RESTRAINT: NV/NON-SELF DESTRUCTIVE BEHAVIOR  Goal: Remains free of harm/injury (restraint for non violent/non self-detsructive behavior)  Description: INTERVENTIONS:  - Instruct patient/family regarding restraint use   - Assess and monitor physiologic and psychological status   - Provide interventions and comfort measures to meet assessed patient needs   - Identify and implement measures to help patient regain control  - Assess readiness for release of restraint   Outcome: Progressing  Goal: Returns to optimal restraint-free functioning  Description: INTERVENTIONS:  - Assess the patient's behavior and symptoms that indicate continued need for restraint  - Identify and implement measures to help patient regain control  - Assess readiness for release of restraint   Outcome: Progressing     Problem: Nutrition/Hydration-ADULT  Goal: Nutrient/Hydration intake appropriate for improving, restoring or maintaining nutritional needs  Description: Monitor and assess patient's nutrition/hydration status for malnutrition  Collaborate with interdisciplinary team and initiate plan and interventions as ordered  Monitor patient's weight and dietary intake as ordered or per policy  Utilize nutrition screening tool and intervene as necessary  Determine patient's food preferences and provide high-protein, high-caloric foods as appropriate       INTERVENTIONS:  - Monitor oral intake, urinary output, labs, and treatment plans  - Assess nutrition and hydration status and recommend course of action  - Evaluate amount of meals eaten  - Assist patient with eating if necessary   - Allow adequate time for meals  - Recommend/ encourage appropriate diets, oral nutritional supplements, and vitamin/mineral supplements  - Order, calculate, and assess calorie counts as needed  - Recommend, monitor, and adjust tube feedings and TPN/PPN based on assessed needs  - Assess need for intravenous fluids  - Provide specific nutrition/hydration education as appropriate  - Include patient/family/caregiver in decisions related to nutrition  Outcome: Progressing     Problem: CARDIOVASCULAR - ADULT  Goal: Maintains optimal cardiac output and hemodynamic stability  Description: INTERVENTIONS:  - Monitor I/O, vital signs and rhythm  - Monitor for S/S and trends of decreased cardiac output  - Administer and titrate ordered vasoactive medications to optimize hemodynamic stability  - Assess quality of pulses, skin color and temperature  - Assess for signs of decreased coronary artery perfusion  - Instruct patient to report change in severity of symptoms  Outcome: Progressing  Goal: Absence of cardiac dysrhythmias or at baseline rhythm  Description: INTERVENTIONS:  - Continuous cardiac monitoring, vital signs, obtain 12 lead EKG if ordered  - Administer antiarrhythmic and heart rate control medications as ordered  - Monitor electrolytes and administer replacement therapy as ordered  Outcome: Progressing     Problem: NEUROSENSORY - ADULT  Goal: Achieves stable or improved neurological status  Description: INTERVENTIONS  - Monitor and report changes in neurological status  - Monitor vital signs such as temperature, blood pressure, glucose, and any other labs ordered   - Initiate measures to prevent increased intracranial pressure  - Monitor for seizure activity and implement precautions if appropriate      Outcome: Progressing  Goal: Achieves maximal functionality and self care  Description: INTERVENTIONS  - Monitor swallowing and airway patency with patient fatigue and changes in neurological status  - Encourage and assist patient to increase activity and self care     - Encourage visually impaired, hearing impaired and aphasic patients to use assistive/communication devices  Outcome: Progressing     Problem: RESPIRATORY - ADULT  Goal: Achieves optimal ventilation and oxygenation  Description: INTERVENTIONS:  - Assess for changes in respiratory status  - Assess for changes in mentation and behavior  - Position to facilitate oxygenation and minimize respiratory effort  - Oxygen administered by appropriate delivery if ordered  - Initiate smoking cessation education as indicated  - Encourage broncho-pulmonary hygiene including cough, deep breathe, Incentive Spirometry  - Assess the need for suctioning and aspirate as needed  - Assess and instruct to report SOB or any respiratory difficulty  - Respiratory Therapy support as indicated  Outcome: Progressing     Problem: METABOLIC, FLUID AND ELECTROLYTES - ADULT  Goal: Electrolytes maintained within normal limits  Description: INTERVENTIONS:  - Monitor labs and assess patient for signs and symptoms of electrolyte imbalances  - Administer electrolyte replacement as ordered  - Monitor response to electrolyte replacements, including repeat lab results as appropriate  - Instruct patient on fluid and nutrition as appropriate  Outcome: Progressing  Goal: Fluid balance maintained  Description: INTERVENTIONS:  - Monitor labs   - Monitor I/O and WT  - Instruct patient on fluid and nutrition as appropriate  - Assess for signs & symptoms of volume excess or deficit  Outcome: Progressing  Goal: Glucose maintained within target range  Description: INTERVENTIONS:  - Monitor Blood Glucose as ordered  - Assess for signs and symptoms of hyperglycemia and hypoglycemia  - Administer ordered medications to maintain glucose within target range  - Assess nutritional intake and initiate nutrition service referral as needed  Outcome: Progressing     Problem: GENITOURINARY - ADULT  Goal: Maintains or returns to baseline urinary function  Description: INTERVENTIONS:  - Assess urinary function  - Encourage oral fluids to ensure adequate hydration if ordered  - Administer IV fluids as ordered to ensure adequate hydration  - Administer ordered medications as needed  - Offer frequent toileting  - Follow urinary retention protocol if ordered  Outcome: Progressing  Goal: Absence of urinary retention  Description: INTERVENTIONS:  - Assess patients ability to void and empty bladder  - Monitor I/O  - Bladder scan as needed  - Discuss with physician/AP medications to alleviate retention as needed  - Discuss catheterization for long term situations as appropriate  Outcome: Progressing  Goal: Urinary catheter remains patent  Description: INTERVENTIONS:  - Assess patency of urinary catheter  - If patient has a chronic bo, consider changing catheter if non-functioning  - Follow guidelines for intermittent irrigation of non-functioning urinary catheter  Outcome: Progressing

## 2021-02-19 NOTE — ASSESSMENT & PLAN NOTE
· Continue telemetry  · Hypokalemia this am at 3 0  · Restarted Kdur 40mg BID  · Continue to replace PRN  · Monitor with BMP

## 2021-02-19 NOTE — ASSESSMENT & PLAN NOTE
ESBL PNA superimposed on COVID-19 PNA s/p Ertapenum 10 day course completed on 2/17      Plan  · Continue to monitor off abx  · Trend WBC and fever curves

## 2021-02-19 NOTE — ASSESSMENT & PLAN NOTE
Secondary to hypoxia and hypercapnia with history of stroke and residual left-sided weakness, patient initially was intubated at [facility] for both hypoxia and hypercapnia and was extubated in ICU here at [facility]      Mental status at baseline    Plan  · Wily ovalle pending-appreciate recs  ·  Continue to monitor for s/sx of depression  · Monitor for delirium

## 2021-02-19 NOTE — PROGRESS NOTES
Progress Note - Hector Miss 1960, 63 y o  male MRN: [id number]    Unit/Bed#: S -01 Encounter: [id number]    Primary Care Provider: Sommer Abreu DO   Date and time admitted to hospital: 1/17/2021  4:16 PM        * Acute respiratory failure with hypoxia and hypercarbia (HCC)  Assessment & Plan  POA acute resp failure with hypoxia and hypercarbia secondary to covid-19 with superimposed ESBL e  Coli PNA  Patient with complicated trajectory originally brought from [facility] ED on 1/17 due to respiratory distress after undergoing emergent intubation  Pt transfered to [facility] and successfully extubated on 1/18  Unfortunately, patient suffered a during this admission likely secondary to existing CVA and was re-intubated on 2/1 and re-extubated on 2/16  Plan:   · Continue [address], titrate to maintain SPO2 >88%  · Pulm following- Recommend BiPAP q h s  and for naps  · Will need formal sleep study as an outpatient      Acute metabolic encephalopathy  Assessment & Plan  Secondary to hypoxia and hypercapnia with history of stroke and residual left-sided weakness, patient initially was intubated at [facility] for both hypoxia and hypercapnia and was extubated in ICU here at [facility]  Mental status at baseline    Plan  · Pysch eval pending-appreciate recs  ·  Continue to monitor for s/sx of depression  · Monitor for delirium      Acute kidney injury superimposed on CKD [facility])  Assessment & Plan  POA, likely multifactorial 2/2 ATN in setting of COVID-19 + contrast induced nephropathy + vanco toxicity  Baseline creatinine 1 0-1 3    Plan    · Turner catheter in place  · Monitor I/Os  · Currently on Bumex 2 mg twice daily  · Monitor renal indices    Diarrhea of presumed infectious origin  Assessment & Plan  Present for multiple days prior to transfer to [facility]  Bowel regimen held 3 days ago and diarrhea persisted  C  Diff negative      Pneumonia due to Escherichia coli [facility])  Assessment & Plan  ESBL PNA superimposed on COVID-19 PNA s/p Ertapenum 10 day course completed on 2/17  Plan  · Continue to monitor off abx  · Trend WBC and fever curves    History of stroke  Assessment & Plan  History of stroke 1 year ago  Now baseline resident at [facility]  Plan  · Continue Eliquis daily  · Continue ASA daily    ? PlanEliquis daily  ? Asa daily       Seizure [facility])  Assessment & Plan  Patient does not have a seizure history but does have history of CVA in the past  Neurology consulted and appreciate input, have signed off with recs for plan as below    Plan  · Continue on Keppra 750mg BID  · EEG 2/2/21 Background activities are too slow suggesting moderate diffuse cerebral dysfunction of nonspecific etiology  · Seizure precautions    Hypernatremia  Assessment & Plan  Patient previously being followed by Nephrology, was on D5W which was d/c as of 2/17      Plan  · Na remains elevated, 148 this am  · Will continue to monitor with BMP      Anemia  Assessment & Plan  · No active bleeding  · Trend CBC daily   · hgb stable    · Transfuse for Hgb < 7 0     Atrial fibrillation (HCC)  Assessment & Plan  · Continue eliquis 2 5mg BID  · Holding cardizem secondary to hypotension  · Continue metoprolol 25mg BID    Depression  Assessment & Plan  · Continue home Zoloft BID  · Will need f/u psych eval - required to return to nursing facility    Morbid obesity   Assessment & Plan  · Continue work with PT/OT  · Recommend therapeutic lifestyle changes to promote weight loss    History of aortic valve replacement with bioprosthetic valve  Assessment & Plan  · Echo 1/2021 - EF 55%, bioprosthetic AV with gradient of 16  · Murmur present on exam today  · Hold home cardizem    Hypokalemia  Assessment & Plan  · Continue telemetry  · Hypokalemia this am at 2 6  · Continue to replace PRN  · Monitor with BMP    Hyperlipidemia  Assessment & Plan  · Continue statin    Essential hypertension  Assessment & Plan  · Hold home cardizem in setting of hypotension  · Continue metoprolol 25mg BID for rate control  · BP's stable on Midodrine TID          VTE Pharmacologic Prophylaxis:   Pharmacologic: Apixaban (Eliquis)  Mechanical VTE Prophylaxis in Place: Yes    Discussions with Specialists or Other Care Team Provider: Psych, Speech pathology    Education and Discussions with Family / Patient: Will update family    Current Length of Stay: 28 day(s)    Current Patient Status: Inpatient     Discharge Plan / Estimated Discharge Date: TBD    Code Status: Level 1 - Full Code      Subjective:   Patient states he is tired of being in the hospital and wants to return home    Objective:     Vitals:   Temp (24hrs), Av 5 °F (36 9 °C), Min:98 4 °F (36 9 °C), Max:98 7 °F (37 1 °C)    Temp:  [98 4 °F (36 9 °C)-98 7 °F (37 1 °C)] 98 5 °F (36 9 °C)  HR:  [] 109  Resp:  [20] 20  BP: ()/(59-61) 98/61  SpO2:  [91 %-96 %] 92 %  Body mass index is 50 18 kg/m²  Input and Output Summary (last 24 hours): Intake/Output Summary (Last 24 hours) at 2021 2222  Last data filed at 2021 1750  Gross per 24 hour   Intake 240 ml   Output --   Net 240 ml       Physical Exam:     Physical Exam  Constitutional:       Appearance: He is obese  HENT:      Head: Normocephalic and atraumatic  Right Ear: External ear normal       Left Ear: External ear normal       Nose: Nose normal    Eyes:      Conjunctiva/sclera: Conjunctivae normal    Cardiovascular:      Heart sounds: Normal heart sounds  Pulmonary:      Breath sounds: Rhonchi and rales present  Comments: On 7L NC  Diminished lungs sound  Abdominal:      General: Bowel sounds are normal    Genitourinary:     Comments: Turner catheter in place  Musculoskeletal:      Right lower leg: Edema present  Left lower leg: Edema present  Skin:     General: Skin is warm and dry  Neurological:      Mental Status: Mental status is at baseline     Psychiatric:         Mood and Affect: Mood normal  Additional Data:     Labs:    Results from last 7 days   Lab Units 02/18/21  0519  02/16/21  0317   WBC Thousand/uL 7 32   < > 10 60*   HEMOGLOBIN g/dL 8 9*   < > 9 2*   HEMATOCRIT % 31 7*   < > 33 2*   PLATELETS Thousands/uL 139*   < > 167   NEUTROS PCT %  --   --  75   LYMPHS PCT %  --   --  6*   LYMPHO PCT % 2*   < >  --    MONOS PCT %  --   --  8   MONO PCT % 10   < >  --    EOS PCT % 10*   < > 8*    < > = values in this interval not displayed  Results from last 7 days   Lab Units 02/18/21  2100   POTASSIUM mmol/L 3 3*   CHLORIDE mmol/L 110*   CO2 mmol/L 28   BUN mg/dL 42*   CREATININE mg/dL 1 34*   CALCIUM mg/dL 8 9           * I Have Reviewed All Lab Data Listed Above  * Additional Pertinent Lab Tests Reviewed:  All Labs Within Last 24 Hours Reviewed    Imaging:    Imaging Reports Reviewed Today Include: None  Imaging Personally Reviewed by Myself Includes:  As above    Recent Cultures (last 7 days):     Results from last 7 days   Lab Units 02/17/21  1007   C DIFF TOXIN B BY PCR  Negative       Last 24 Hours Medication List:   Current Facility-Administered Medications   Medication Dose Route Frequency Provider Last Rate    acetaminophen  650 mg Oral Q6H PRN Pearle Favors, DO      apixaban  2 5 mg Oral BID Pearaye Favors, DO      aspirin  81 mg Oral Daily Pearaye Favors, DO      atorvastatin  40 mg Per NG Tube HS Pearle Favors, DO      bumetanide  2 mg Oral Daily Nicki Henry MD      cholecalciferol  2,000 Units Per NG Tube Daily Pearaye Favors, DO      insulin lispro  1-5 Units Subcutaneous HS KATHY Saenz      insulin lispro  2-12 Units Subcutaneous TID AC KATHY Leonard      levETIRAcetam  750 mg Oral Q12H [facility], [facility]      melatonin  6 mg Oral HS Pearle Favors, DO      metoprolol tartrate  25 mg Oral Q12H [facility], DO      midodrine  10 mg Oral Q8H Pearaye Favors, DO      multivitamin-minerals  1 tablet Oral Daily Pearle Favors, DO      omeprazole (PRILOSEC) suspension 2 mg/mL  20 mg Oral Q12H Alber Ca DO      [START ON 2/19/2021] sertraline  125 mg Oral Daily Bibi Torres MD          Today, Patient Was Seen By: Sara Telles MD    ** Please Note: This note has been constructed using a voice recognition system   **

## 2021-02-19 NOTE — ASSESSMENT & PLAN NOTE
Present for multiple days prior to transfer to [facility]  Bowel regimen held 3 days ago and diarrhea persisted  C  Diff negative

## 2021-02-19 NOTE — ASSESSMENT & PLAN NOTE
POA acute resp failure with hypoxia and hypercarbia secondary to covid-19 with superimposed ESBL e  Coli PNA  Patient with complicated trajectory originally brought from [facility] ED on 1/17 due to respiratory distress after undergoing emergent intubation  Pt [facility]fered [facility] and successfully extubated on 1/18  Unfortunately, patient suffered a during this admission likely secondary to existing CVA and was re-intubated on 2/1 and re-extubated on 2/16      Plan:   · Continue [address], titrate to maintain SPO2 >88%  · Pulm following- Recommend BiPAP q h s  and for naps  · Will need formal sleep study as an outpatient

## 2021-02-19 NOTE — ASSESSMENT & PLAN NOTE
Secondary to hypoxia and hypercapnia with history of stroke and residual left-sided weakness, patient initially was intubated at [facility] for both hypoxia and hypercapnia and was extubated in ICU here at [facility]      Mental status at baseline    Plan  · Wily ovalle pending-appreciate recs  ·  Continue to monitor for s/sx of depression  · Monitor for delirium

## 2021-02-19 NOTE — ASSESSMENT & PLAN NOTE
· Tolerating puree and nectar thick liquids by straw w/o overt dysphagia  · Dysphagia-2 diet with NT per speech pathology

## 2021-02-19 NOTE — ASSESSMENT & PLAN NOTE
Patient previously being followed by Nephrology, was on D5W which was d/c as of 2/17      Plan  · Na remains elevated, 148 this am  · Will continue to monitor with BMP

## 2021-02-19 NOTE — PROGRESS NOTES
Progress Note - Pulmonary   Ilda Modest 63 y o  male MRN: [id number]  Unit/Bed#: S -01 Encounter: [id number]    Assessment/Plan:    1  Acute hypoxic/hypercapnic respiratory failure likely secondary to multifaceted as listed below       -  currently on 6 L-95%, patient does not wear home O2       -  will continue maintain saturations greater than 89%       -  pulmonary toileting:  Deep breathing cough, OOB as tolerated, IS Q 1 hr       -  will need ambulatory pulse ox prior to discharge    2  ESBL and E coli pneumonia       -  2/16/2021- completed 10 days Ertapenem       -  patient remains afebrile, WBC unremarkable, BC- negative       -  no indication for further antibiotics    3  COVID-19 1/17/2021       -  1/17/2021- convalescent plasma       -  1/28/2021- Actemra       -  2/1/2021-2/16/2021- intubated       -  2/15/2021- completed 31 days Decadron       -  continue Eliquis 2 5 mg b i d     4  Suspected volume overload in the setting of CHF and AFib       -  Echo 1/20- EF 55%       proBNP 1470       -  diuresing per primary team       -  overall - 5L, continue I&Os, daily weights    5  Suspected ANGELINA and ohs likely secondary to body habitus       -  will continue BiPAP 16/8 cm of H2O at night and with naps       -  will need overnight pulse ox and morning ABG 24 hours prior to discharge if patient is going home       -  recommend formal outpatient PSG          Chief Complaint:    "Brandt tiered"    Subjective:    Veverly Elton was comfortably sitting in his bed  Patient was somewhat difficult to arouse  He reports that he is very tired this morning  No significant overnight events reported  Patient currently denying any fevers, chills, hemoptysis, headaches, night sweats, pleuritic chest pain, or palpitations  Objective:    Vitals: Blood pressure 98/61, pulse (!) 109, temperature 98 5 °F (36 9 °C), temperature source Oral, resp   rate 20, height 5' 11" (1 803 m), weight (!) 163 kg (359 lb 12 7 oz), SpO2 95 % 6L,Body mass index is 50 18 kg/m²  Intake/Output Summary (Last 24 hours) at 2/19/2021 0652  Last data filed at 2/19/2021 0031  Gross per 24 hour   Intake 240 ml   Output 1025 ml   Net -785 ml       Invasive Devices     Peripheral Intravenous Line            Peripheral IV 02/19/21 Distal;Dorsal (posterior); Right Forearm less than 1 day          Drain            Urethral Catheter Straight-tip 16 Fr  32 days    Rectal Tube With balloon 6 days                Physical Exam:   Physical Exam  Constitutional:       General: He is not in acute distress  Appearance: Normal appearance  He is normal weight  He is not ill-appearing  HENT:      Head: Normocephalic and atraumatic  Nose: Nose normal  No congestion or rhinorrhea  Mouth/Throat:      Mouth: Mucous membranes are dry  Pharynx: No oropharyngeal exudate or posterior oropharyngeal erythema  Neck:      Musculoskeletal: Normal range of motion and neck supple  No neck rigidity or muscular tenderness  Cardiovascular:      Rate and Rhythm: Normal rate and regular rhythm  Pulses: Normal pulses  Heart sounds: Normal heart sounds  No murmur  No friction rub  No gallop  Pulmonary:      Effort: Pulmonary effort is normal  No tachypnea, bradypnea, accessory muscle usage or respiratory distress  Breath sounds: Decreased air movement present  No stridor or transmitted upper airway sounds  Decreased breath sounds present  No wheezing, rhonchi or rales  Comments: Diminished breath sounds at bases  Chest:      Chest wall: No tenderness  Abdominal:      General: Abdomen is flat  Bowel sounds are normal  There is no distension  Palpations: Abdomen is soft  There is no mass  Musculoskeletal: Normal range of motion  General: No swelling or tenderness  Skin:     General: Skin is warm and dry  Coloration: Skin is not jaundiced or pale  Neurological:      General: No focal deficit present        Mental Status: He is alert and oriented to person, place, and time  Mental status is at baseline  Psychiatric:         Mood and Affect: Mood normal          Behavior: Behavior normal          Labs:    I have personally reviewed pertinent lab results CBC:   Lab Results   Component Value Date    WBC 7 59 02/19/2021    HGB 8 7 (L) 02/19/2021    HCT 31 0 (L) 02/19/2021    MCV 88 02/19/2021     02/19/2021    MCH 24 6 (L) 02/19/2021    MCHC 28 1 (L) 02/19/2021    RDW 25 9 (H) 02/19/2021    MPV 11 6 02/19/2021    NRBC 0 02/19/2021   , CMP:   Lab Results   Component Value Date    SODIUM 148 (H) 02/18/2021    K 3 3 (L) 02/18/2021     (H) 02/18/2021    CO2 28 02/18/2021    BUN 42 (H) 02/18/2021    CREATININE 1 34 (H) 02/18/2021    CALCIUM 8 9 02/18/2021    EGFR 57 02/18/2021       Imaging and other studies: I have personally reviewed pertinent films in PACS     Chest x-ray 2/14/2021- extensive opacities throughout both lungs

## 2021-02-19 NOTE — SPEECH THERAPY NOTE
Speech Language/Pathology    Speech/Language Pathology Progress Note    Patient Name: Talat Albert  PUBGW'W Date: 2/19/2021       Subjective:  Pt seen for dysphagia tx follow up, trial dysphagia 2 w/ nectar thick liquids  Pt has been somnolent all day, now more awake and alert, responding verbally, stated he is hungry  Objective:  Pt fed minced meatloaf, mashed potatoes, chopped carrots, and NTL gingerale by straw  Pt w/ prolonged mastication/manipulation due to edentulous state, adequate transfer  Pt took successive sips of liquids by straw w/ good oral control/transfer  Swallows were timely and complete w/ no overt s/s aspiration  Pt w/ fairly good po intake  Assessment:  Pt tolerated dysphagia 2 w/ mild oral dysphagia symptoms, no overt s/s aspiration w/ successive sips of NTL by straw  Plan/Recommendations:  Advance diet to dysphagia 2 w/ nectar thick liquids  meds in pudding    Aspiration precautions    Desiree Churchill, [facility], [place] CCC-SLP  Speech Pathologist  Available via  tiger text

## 2021-02-19 NOTE — ASSESSMENT & PLAN NOTE
Patient does not have a seizure history but does have history of CVA in the past  Neurology consulted and appreciate input, have signed off with recs for plan as below    Plan  · Continue on Keppra 750mg BID  · EEG 2/2/21 Background activities are too slow suggesting moderate diffuse cerebral dysfunction of nonspecific etiology  · Seizure precautions

## 2021-02-19 NOTE — PLAN OF CARE
Problem: Prexisting or High Potential for Compromised Skin Integrity  Goal: Skin integrity is maintained or improved  Description: INTERVENTIONS:  - Identify patients at risk for skin breakdown  - Assess and monitor skin integrity  - Assess and monitor nutrition and hydration status  - Monitor labs   - Assess for incontinence   - Turn and reposition patient  - Assist with mobility/ambulation  - Relieve pressure over bony prominences  - Avoid friction and shearing  - Provide appropriate hygiene as needed including keeping skin clean and dry  - Evaluate need for skin moisturizer/barrier cream  - Collaborate with interdisciplinary team   - Patient/family teaching  - Consider wound care consult   Outcome: Progressing     Problem: Potential for Falls  Goal: Patient will remain free of falls  Description: INTERVENTIONS:  - Assess patient frequently for physical needs  -  Identify cognitive and physical deficits and behaviors that affect risk of falls  -  Bruce fall precautions as indicated by assessment   - Educate patient/family on patient safety including physical limitations  - Instruct patient to call for assistance with activity based on assessment  - Modify environment to reduce risk of injury  - Consider OT/PT consult to assist with strengthening/mobility  Outcome: Progressing     Problem: Nutrition/Hydration-ADULT  Goal: Nutrient/Hydration intake appropriate for improving, restoring or maintaining nutritional needs  Description: Monitor and assess patient's nutrition/hydration status for malnutrition  Collaborate with interdisciplinary team and initiate plan and interventions as ordered  Monitor patient's weight and dietary intake as ordered or per policy  Utilize nutrition screening tool and intervene as necessary  Determine patient's food preferences and provide high-protein, high-caloric foods as appropriate       INTERVENTIONS:  - Monitor oral intake, urinary output, labs, and treatment plans  - Assess nutrition and hydration status and recommend course of action  - Evaluate amount of meals eaten  - Assist patient with eating if necessary   - Allow adequate time for meals  - Recommend/ encourage appropriate diets, oral nutritional supplements, and vitamin/mineral supplements  - Order, calculate, and assess calorie counts as needed  - Recommend, monitor, and adjust tube feedings and TPN/PPN based on assessed needs  - Assess need for intravenous fluids  - Provide specific nutrition/hydration education as appropriate  - Include patient/family/caregiver in decisions related to nutrition  Outcome: Progressing     Problem: NEUROSENSORY - ADULT  Goal: Achieves stable or improved neurological status  Description: INTERVENTIONS  - Monitor and report changes in neurological status  - Monitor vital signs such as temperature, blood pressure, glucose, and any other labs ordered   - Initiate measures to prevent increased intracranial pressure  - Monitor for seizure activity and implement precautions if appropriate      Outcome: Progressing  Goal: Achieves maximal functionality and self care  Description: INTERVENTIONS  - Monitor swallowing and airway patency with patient fatigue and changes in neurological status  - Encourage and assist patient to increase activity and self care     - Encourage visually impaired, hearing impaired and aphasic patients to use assistive/communication devices  Outcome: Progressing     Problem: CARDIOVASCULAR - ADULT  Goal: Maintains optimal cardiac output and hemodynamic stability  Description: INTERVENTIONS:  - Monitor I/O, vital signs and rhythm  - Monitor for S/S and trends of decreased cardiac output  - Administer and titrate ordered vasoactive medications to optimize hemodynamic stability  - Assess quality of pulses, skin color and temperature  - Assess for signs of decreased coronary artery perfusion  - Instruct patient to report change in severity of symptoms  Outcome: Progressing  Goal: Absence of cardiac dysrhythmias or at baseline rhythm  Description: INTERVENTIONS:  - Continuous cardiac monitoring, vital signs, obtain 12 lead EKG if ordered  - Administer antiarrhythmic and heart rate control medications as ordered  - Monitor electrolytes and administer replacement therapy as ordered  Outcome: Progressing     Problem: RESPIRATORY - ADULT  Goal: Achieves optimal ventilation and oxygenation  Description: INTERVENTIONS:  - Assess for changes in respiratory status  - Assess for changes in mentation and behavior  - Position to facilitate oxygenation and minimize respiratory effort  - Oxygen administered by appropriate delivery if ordered  - Initiate smoking cessation education as indicated  - Encourage broncho-pulmonary hygiene including cough, deep breathe, Incentive Spirometry  - Assess the need for suctioning and aspirate as needed  - Assess and instruct to report SOB or any respiratory difficulty  - Respiratory Therapy support as indicated  Outcome: Progressing     Problem: GENITOURINARY - ADULT  Goal: Maintains or returns to baseline urinary function  Description: INTERVENTIONS:  - Assess urinary function  - Encourage oral fluids to ensure adequate hydration if ordered  - Administer IV fluids as ordered to ensure adequate hydration  - Administer ordered medications as needed  - Offer frequent toileting  - Follow urinary retention protocol if ordered  Outcome: Progressing  Goal: Absence of urinary retention  Description: INTERVENTIONS:  - Assess patients ability to void and empty bladder  - Monitor I/O  - Bladder scan as needed  - Discuss with physician/AP medications to alleviate retention as needed  - Discuss catheterization for long term situations as appropriate  Outcome: Progressing  Goal: Urinary catheter remains patent  Description: INTERVENTIONS:  - Assess patency of urinary catheter  - If patient has a chronic bo, consider changing catheter if non-functioning  - Follow guidelines for intermittent irrigation of non-functioning urinary catheter  Outcome: Progressing     Problem: METABOLIC, FLUID AND ELECTROLYTES - ADULT  Goal: Electrolytes maintained within normal limits  Description: INTERVENTIONS:  - Monitor labs and assess patient for signs and symptoms of electrolyte imbalances  - Administer electrolyte replacement as ordered  - Monitor response to electrolyte replacements, including repeat lab results as appropriate  - Instruct patient on fluid and nutrition as appropriate  Outcome: Progressing  Goal: Fluid balance maintained  Description: INTERVENTIONS:  - Monitor labs   - Monitor I/O and WT  - Instruct patient on fluid and nutrition as appropriate  - Assess for signs & symptoms of volume excess or deficit  Outcome: Progressing  Goal: Glucose maintained within target range  Description: INTERVENTIONS:  - Monitor Blood Glucose as ordered  - Assess for signs and symptoms of hyperglycemia and hypoglycemia  - Administer ordered medications to maintain glucose within target range  - Assess nutritional intake and initiate nutrition service referral as needed  Outcome: Progressing     Problem: MUSCULOSKELETAL - ADULT  Goal: Maintain or return mobility to safest level of function  Description: INTERVENTIONS:  - Assess patient's ability to carry out ADLs; assess patient's baseline for ADL function and identify physical deficits which impact ability to perform ADLs (bathing, care of mouth/teeth, toileting, grooming, dressing, etc )  - Assess/evaluate cause of self-care deficits   - Assess range of motion  - Assess patient's mobility  - Assess patient's need for assistive devices and provide as appropriate  - Encourage maximum independence but intervene and supervise when necessary  - Involve family in performance of ADLs  - Assess for home care needs following discharge   - Consider OT consult to assist with ADL evaluation and planning for discharge  - Provide patient education as appropriate  Outcome: Progressing  Goal: Maintain proper alignment of affected body part  Description: INTERVENTIONS:  - Support, maintain and protect limb and body alignment  - Provide patient/ family with appropriate education  Outcome: Progressing     Problem: INFECTION - ADULT  Goal: Absence or prevention of progression during hospitalization  Description: INTERVENTIONS:  - Assess and monitor for signs and symptoms of infection  - Monitor lab/diagnostic results  - Monitor all insertion sites, i e  indwelling lines, tubes, and drains  - Monitor endotracheal if appropriate and nasal secretions for changes in amount and color  - [place] appropriate cooling/warming therapies per order  - Administer medications as ordered  - Instruct and encourage patient and family to use good hand hygiene technique  - Identify and instruct in appropriate isolation precautions for identified infection/condition  Outcome: Progressing     Problem: DISCHARGE PLANNING  Goal: Discharge to home or other facility with appropriate resources  Description: INTERVENTIONS:  - Identify barriers to discharge w/patient and caregiver  - Arrange for needed discharge resources and transportation as appropriate  - Identify discharge learning needs (meds, wound care, etc )  - Arrange for interpretive services to assist at discharge as needed  - Refer to Case Management Department for coordinating discharge planning if the patient needs post-hospital services based on physician/advanced practitioner order or complex needs related to functional status, cognitive ability, or social support system  Outcome: Progressing     Problem: Knowledge Deficit  Goal: Patient/family/caregiver demonstrates understanding of disease process, treatment plan, medications, and discharge instructions  Description: Complete learning assessment and assess knowledge base    Interventions:  - Provide teaching at level of understanding  - Provide teaching via preferred learning methods  Outcome: Progressing     Problem: SAFETY,RESTRAINT: NV/NON-SELF DESTRUCTIVE BEHAVIOR  Goal: Remains free of harm/injury (restraint for non violent/non self-detsructive behavior)  Description: INTERVENTIONS:  - Instruct patient/family regarding restraint use   - Assess and monitor physiologic and psychological status   - Provide interventions and comfort measures to meet assessed patient needs   - Identify and implement measures to help patient regain control  - Assess readiness for release of restraint   Outcome: Progressing  Goal: Returns to optimal restraint-free functioning  Description: INTERVENTIONS:  - Assess the patient's behavior and symptoms that indicate continued need for restraint  - Identify and implement measures to help patient regain control  - Assess readiness for release of restraint   Outcome: Progressing

## 2021-02-19 NOTE — ASSESSMENT & PLAN NOTE
POA, likely multifactorial 2/2 ATN in setting of COVID-19 + contrast induced nephropathy + vanco toxicity   Baseline creatinine 1 0-1 3    Plan    · Turner catheter in place  · Monitor I/Os  · Currently on Bumex 2 mg twice daily  · Monitor renal indices

## 2021-02-19 NOTE — ASSESSMENT & PLAN NOTE
History of stroke 1 year ago  Now baseline resident at [facility]  Plan  · Continue Eliquis daily  · Continue ASA daily    ? PlanEliquis daily  ?  Asa daily

## 2021-02-19 NOTE — ASSESSMENT & PLAN NOTE
· Continue sertraline 125 mg   · Per psych eval-patient is not suicidal, does not require 1:1  · Required to return to nursing facility

## 2021-02-19 NOTE — CASE MANAGEMENT
MARCIA spoke to William in Admissions at [facility] and [address] [place] [address] where patient is a long term resident  Telephone is [phone]  Updated clinical information including yesterday's psych note provided to William in admissions at [facility] via [employer] including patient's current use of 6LO2 and BiPap at night and with naps, settings per Pulm note say 16/8  William in admissions reports that she will initiate the ordering of the Bipap

## 2021-02-19 NOTE — PROGRESS NOTES
Progress Note - Lay Catherine 1960, 63 y o  male MRN: [id number]    Unit/Bed#: S -01 Encounter: [id number]    Primary Care Provider: Jose Guadalupe Pelayo DO   Date and time admitted to hospital: 1/17/2021  4:16 PM        * Acute respiratory failure with hypoxia and hypercarbia (HCC)  Assessment & Plan  POA acute resp failure with hypoxia and hypercarbia secondary to covid-19 with superimposed ESBL e  Coli PNA  Patient with complicated trajectory originally brought from [facility] ED on 1/17 due to respiratory distress after undergoing emergent intubation  Pt transfered to [facility] and successfully extubated on 1/18  Unfortunately, patient suffered a during this admission likely secondary to existing CVA and was re-intubated on 2/1 and re-extubated on 2/16  Plan:   · Currently on 5L NC satting 93-95%, titrate to maintain SPO2 >88%  · Pulm following- overnight pulse ox and morning ABG 24 hours prior to discharge (only if d/c to private home)  · Continue BiPAP q h s  and for naps  · Will need formal sleep study as an outpatient        Acute metabolic encephalopathy  Assessment & Plan  Secondary to hypoxia and hypercapnia with history of stroke and residual left-sided weakness, patient initially was intubated at [facility] for both hypoxia and hypercapnia and was extubated in ICU here at [facility]  Mental status at baseline    Plan  · Pysch eval pending-appreciate recs  ·  Continue to monitor for s/sx of depression  · Monitor for delirium      Acute kidney injury superimposed on CKD [facility])  Assessment & Plan  POA, likely multifactorial 2/2 ATN in setting of COVID-19 + contrast induced nephropathy + vanco toxicity  Baseline creatinine 1 0-1 3    Plan    · Turner catheter in place  · Monitor I/Os  · Currently on Bumex 2 mg twice daily  · Monitor renal indices    Diarrhea of presumed infectious origin  Assessment & Plan  Present for multiple days prior to transfer to [place]   Bowel regimen held 3 days ago and diarrhea persisted  C  Diff negative  Pneumonia due to Escherichia coli [facility])  Assessment & Plan  ESBL PNA superimposed on COVID-19 PNA s/p Ertapenum 10 day course completed on 2/17  Plan  · Continue to monitor off abx  · Trend WBC and fever curves    History of stroke  Assessment & Plan  History of stroke 1 year ago  Now baseline resident at [facility]  Plan  · Continue Eliquis daily  · Continue ASA daily    ? Eliquis daily  ? Asa daily       Seizure [facility])  Assessment & Plan  Patient does not have a seizure history but does have history of CVA in the past  Neurology consulted and appreciate input, have signed off with recs for plan as below    Plan  · Continue on Keppra 750mg BID  · EEG 2/2/21 Background activities are too slow suggesting moderate diffuse cerebral dysfunction of nonspecific etiology  · Seizure precautions    Hypernatremia  Assessment & Plan  Patient previously being followed by Nephrology, was on D5W which was d/c as of 2/17      Plan  · Na remains elevated, 148 this am  · Will continue to monitor with BMP      Anemia  Assessment & Plan  · No active bleeding  · Trend CBC daily   · hgb stable    · Transfuse for Hgb < 7 0     Atrial fibrillation (HCC)  Assessment & Plan  · Continue eliquis 2 5mg BID  · Holding cardizem secondary to hypotension  · Continue metoprolol 25mg BID    Depression  Assessment & Plan  · Continue sertraline 125 mg   · Per psych eval-patient is not suicidal, does not require 1:1  · Required to return to nursing facility    Morbid obesity   Assessment & Plan  · Continue work with PT/OT  · Recommend therapeutic lifestyle changes to promote weight loss    Dysphagia  Assessment & Plan  · Tolerating puree and nectar thick liquids by straw w/o overt dysphagia  · Dysphagia-2 diet with NT per speech pathology    History of aortic valve replacement with bioprosthetic valve  Assessment & Plan  · Echo 1/2021 - EF 55%, bioprosthetic AV with gradient of 16  · Murmur present on exam today  · Hold home cardizem    Hypokalemia  Assessment & Plan  · Continue telemetry  · Hypokalemia this am at 3 0  · Restarted Kdur 40mg BID  · Continue to replace PRN  · Monitor with BMP    Hyperlipidemia  Assessment & Plan  · Continue statin    Essential hypertension  Assessment & Plan  · Hold home cardizem in setting of hypotension  · Continue metoprolol 25mg BID for rate control  · BP's stable on Midodrine TID        VTE Pharmacologic Prophylaxis:   Pharmacologic: Apixaban (Eliquis)  Mechanical VTE Prophylaxis in Place: Yes    Discussions with Specialists or Other Care Team Provider: Psych, Speech pathology    Education and Discussions with Family / Patient: Will update family    Current Length of Stay: 35 day(s)    Current Patient Status: Inpatient     Discharge Plan / Estimated Discharge Date: TBD    Code Status: Level 1 - Full Code      Subjective:   Patient states he is tired of being in the hospital and wants to return home    Objective:     Vitals:   Temp (24hrs), Av 4 °F (36 9 °C), Min:98 2 °F (36 8 °C), Max:98 5 °F (36 9 °C)    Temp:  [98 2 °F (36 8 °C)-98 5 °F (36 9 °C)] 98 2 °F (36 8 °C)  HR:  [] 95  Resp:  [18-20] 18  BP: ()/(61-73) 111/73  SpO2:  [90 %-96 %] 93 %  Body mass index is 50 18 kg/m²  Input and Output Summary (last 24 hours): Intake/Output Summary (Last 24 hours) at 2021 1529  Last data filed at 2021 1401  Gross per 24 hour   Intake 240 ml   Output 2075 ml   Net -1835 ml       Physical Exam:     Physical Exam  Constitutional:       Appearance: He is obese  HENT:      Head: Normocephalic and atraumatic  Right Ear: External ear normal       Left Ear: External ear normal       Nose: Nose normal    Eyes:      Conjunctiva/sclera: Conjunctivae normal    Cardiovascular:      Heart sounds: Normal heart sounds  Pulmonary:      Breath sounds: Rhonchi and rales present  Comments:  On 5L NC  Diminished lungs sound  Abdominal:      General: Bowel sounds are normal    Genitourinary:     Comments: Turner catheter in place  Musculoskeletal:      Right lower leg: Edema present  Left lower leg: Edema present  Skin:     General: Skin is warm and dry  Neurological:      Mental Status: Mental status is at baseline  Psychiatric:         Mood and Affect: Mood normal          Additional Data:     Labs:    Results from last 7 days   Lab Units 02/19/21  0548   WBC Thousand/uL 7 59   HEMOGLOBIN g/dL 8 7*   HEMATOCRIT % 31 0*   PLATELETS Thousands/uL 168   NEUTROS PCT % 71   LYMPHS PCT % 7*   MONOS PCT % 9   EOS PCT % 10*     Results from last 7 days   Lab Units 02/19/21  0548   POTASSIUM mmol/L 3 0*   CHLORIDE mmol/L 111*   CO2 mmol/L 26   BUN mg/dL 41*   CREATININE mg/dL 1 30   CALCIUM mg/dL 9 0           * I Have Reviewed All Lab Data Listed Above  * Additional Pertinent Lab Tests Reviewed:  All Labs Within Last 24 Hours Reviewed    Imaging:    Imaging Reports Reviewed Today Include: None  Imaging Personally Reviewed by Myself Includes:  As above    Recent Cultures (last 7 days):     Results from last 7 days   Lab Units 02/17/21  1007   C DIFF TOXIN B BY PCR  Negative       Last 24 Hours Medication List:   Current Facility-Administered Medications   Medication Dose Route Frequency Provider Last Rate    acetaminophen  650 mg Oral Q6H PRN Eric Garcia, DO      apixaban  2 5 mg Oral BID Eric Garcia DO      aspirin  81 mg Oral Daily Eric Garcia DO      atorvastatin  40 mg Per NG Tube HS Eric Garcia DO      [START ON 2/20/2021] bumetanide  2 mg Oral Daily Jose R Landry MD      bumetanide  2 mg Oral Once Tatiana Moreno MD      cholecalciferol  2,000 Units Per NG Tube Daily Eric Garcia DO      insulin lispro  1-5 Units Subcutaneous HS Alveyodit LacrKATHY watson      insulin lispro  2-12 Units Subcutaneous TID AC KATHY Leonard      levETIRAcetam  750 mg Oral Q12H [address] [place] [address], DO      melatonin  6 mg Oral HS Eric Garcia DO  metoprolol tartrate  25 mg Oral Q12H 616 [address] [place] [address], DO      midodrine  10 mg Oral Q8H Iven Hurl, DO      multivitamin-minerals  1 tablet Oral Daily Iven Hurl, DO      omeprazole (PRILOSEC) suspension 2 mg/mL  20 mg Oral Q12H Iven Hurl, DO      potassium chloride  40 mEq Oral BID Aisha Phelan MD      sertraline  125 mg Oral Daily Ursula Reese MD          Today, Patient Was Seen By: Aisha Phelan MD    ** Please Note: This note has been constructed using a voice recognition system   **

## 2021-02-19 NOTE — ASSESSMENT & PLAN NOTE
· Continue eliquis 2 5mg BID  · Holding cardizem secondary to hypotension  · Continue metoprolol 25mg BID

## 2021-02-20 LAB
ALBUMIN SERPL BCP-MCNC: 2.7 G/DL (ref 3.5–5)
ALP SERPL-CCNC: 68 U/L (ref 46–116)
ALT SERPL W P-5'-P-CCNC: 28 U/L (ref 12–78)
ANION GAP SERPL CALCULATED.3IONS-SCNC: 9 MMOL/L (ref 4–13)
AST SERPL W P-5'-P-CCNC: 19 U/L (ref 5–45)
ATRIAL RATE: 105 BPM
ATRIAL RATE: 109 BPM
BASOPHILS # BLD AUTO: 0.03 THOUSANDS/ΜL (ref 0–0.1)
BASOPHILS NFR BLD AUTO: 0 % (ref 0–1)
BILIRUB SERPL-MCNC: 0.54 MG/DL (ref 0.2–1)
BUN SERPL-MCNC: 35 MG/DL (ref 5–25)
CALCIUM ALBUM COR SERPL-MCNC: 9.5 MG/DL (ref 8.3–10.1)
CALCIUM SERPL-MCNC: 8.5 MG/DL (ref 8.3–10.1)
CHLORIDE SERPL-SCNC: 112 MMOL/L (ref 100–108)
CO2 SERPL-SCNC: 27 MMOL/L (ref 21–32)
CREAT SERPL-MCNC: 1.25 MG/DL (ref 0.6–1.3)
EOSINOPHIL # BLD AUTO: 0.82 THOUSAND/ΜL (ref 0–0.61)
EOSINOPHIL NFR BLD AUTO: 12 % (ref 0–6)
ERYTHROCYTE [DISTWIDTH] IN BLOOD BY AUTOMATED COUNT: 26 % (ref 11.6–15.1)
GFR SERPL CREATININE-BSD FRML MDRD: 62 ML/MIN/1.73SQ M
GLUCOSE SERPL-MCNC: 105 MG/DL (ref 65–140)
GLUCOSE SERPL-MCNC: 110 MG/DL (ref 65–140)
GLUCOSE SERPL-MCNC: 122 MG/DL (ref 65–140)
GLUCOSE SERPL-MCNC: 87 MG/DL (ref 65–140)
GLUCOSE SERPL-MCNC: 97 MG/DL (ref 65–140)
HCT VFR BLD AUTO: 28.6 % (ref 36.5–49.3)
HGB BLD-MCNC: 7.9 G/DL (ref 12–17)
IMM GRANULOCYTES # BLD AUTO: 0.11 THOUSAND/UL (ref 0–0.2)
IMM GRANULOCYTES NFR BLD AUTO: 2 % (ref 0–2)
LYMPHOCYTES # BLD AUTO: 0.61 THOUSANDS/ΜL (ref 0.6–4.47)
LYMPHOCYTES NFR BLD AUTO: 9 % (ref 14–44)
MAGNESIUM SERPL-MCNC: 2.1 MG/DL (ref 1.6–2.6)
MCH RBC QN AUTO: 25 PG (ref 26.8–34.3)
MCHC RBC AUTO-ENTMCNC: 27.6 G/DL (ref 31.4–37.4)
MCV RBC AUTO: 91 FL (ref 82–98)
MONOCYTES # BLD AUTO: 0.62 THOUSAND/ΜL (ref 0.17–1.22)
MONOCYTES NFR BLD AUTO: 9 % (ref 4–12)
NEUTROPHILS # BLD AUTO: 4.89 THOUSANDS/ΜL (ref 1.85–7.62)
NEUTS SEG NFR BLD AUTO: 68 % (ref 43–75)
NRBC BLD AUTO-RTO: 0 /100 WBCS
NT-PROBNP SERPL-MCNC: 2167 PG/ML
P AXIS: 43 DEGREES
PLATELET # BLD AUTO: 162 THOUSANDS/UL (ref 149–390)
PMV BLD AUTO: 11.4 FL (ref 8.9–12.7)
POTASSIUM SERPL-SCNC: 3.1 MMOL/L (ref 3.5–5.3)
PR INTERVAL: 192 MS
PR INTERVAL: 200 MS
PROT SERPL-MCNC: 6.5 G/DL (ref 6.4–8.2)
QRS AXIS: 42 DEGREES
QRS AXIS: 43 DEGREES
QRSD INTERVAL: 154 MS
QRSD INTERVAL: 156 MS
QT INTERVAL: 390 MS
QT INTERVAL: 458 MS
QTC INTERVAL: 525 MS
QTC INTERVAL: 605 MS
RBC # BLD AUTO: 3.16 MILLION/UL (ref 3.88–5.62)
SODIUM SERPL-SCNC: 148 MMOL/L (ref 136–145)
T WAVE AXIS: 124 DEGREES
T WAVE AXIS: 98 DEGREES
VENTRICULAR RATE: 105 BPM
VENTRICULAR RATE: 109 BPM
WBC # BLD AUTO: 7.08 THOUSAND/UL (ref 4.31–10.16)

## 2021-02-20 PROCEDURE — 83735 ASSAY OF MAGNESIUM: CPT | Performed by: FAMILY MEDICINE

## 2021-02-20 PROCEDURE — 94760 N-INVAS EAR/PLS OXIMETRY 1: CPT

## 2021-02-20 PROCEDURE — 85025 COMPLETE CBC W/AUTO DIFF WBC: CPT | Performed by: INTERNAL MEDICINE

## 2021-02-20 PROCEDURE — 99232 SBSQ HOSP IP/OBS MODERATE 35: CPT | Performed by: FAMILY MEDICINE

## 2021-02-20 PROCEDURE — 83880 ASSAY OF NATRIURETIC PEPTIDE: CPT | Performed by: INTERNAL MEDICINE

## 2021-02-20 PROCEDURE — 80053 COMPREHEN METABOLIC PANEL: CPT | Performed by: INTERNAL MEDICINE

## 2021-02-20 PROCEDURE — 82948 REAGENT STRIP/BLOOD GLUCOSE: CPT

## 2021-02-20 PROCEDURE — 94660 CPAP INITIATION&MGMT: CPT

## 2021-02-20 PROCEDURE — 93010 ELECTROCARDIOGRAM REPORT: CPT | Performed by: INTERNAL MEDICINE

## 2021-02-20 RX ORDER — BUMETANIDE 0.25 MG/ML
2 INJECTION, SOLUTION INTRAMUSCULAR; INTRAVENOUS 2 TIMES DAILY
Status: COMPLETED | OUTPATIENT
Start: 2021-02-20 | End: 2021-02-21

## 2021-02-20 RX ORDER — POTASSIUM CHLORIDE 14.9 MG/ML
20 INJECTION INTRAVENOUS ONCE
Status: COMPLETED | OUTPATIENT
Start: 2021-02-20 | End: 2021-02-20

## 2021-02-20 RX ORDER — POTASSIUM CHLORIDE 20 MEQ/1
40 TABLET, EXTENDED RELEASE ORAL
Status: DISCONTINUED | OUTPATIENT
Start: 2021-02-20 | End: 2021-03-04 | Stop reason: HOSPADM

## 2021-02-20 RX ADMIN — POTASSIUM CHLORIDE 40 MEQ: 1500 TABLET, EXTENDED RELEASE ORAL at 08:15

## 2021-02-20 RX ADMIN — LEVETIRACETAM 750 MG: 500 TABLET, FILM COATED ORAL at 08:14

## 2021-02-20 RX ADMIN — APIXABAN 2.5 MG: 2.5 TABLET, FILM COATED ORAL at 08:15

## 2021-02-20 RX ADMIN — BUMETANIDE 2 MG: 0.25 INJECTION INTRAMUSCULAR; INTRAVENOUS at 17:27

## 2021-02-20 RX ADMIN — POTASSIUM CHLORIDE 40 MEQ: 1500 TABLET, EXTENDED RELEASE ORAL at 17:27

## 2021-02-20 RX ADMIN — Medication 2000 UNITS: at 08:14

## 2021-02-20 RX ADMIN — ASPIRIN 81 MG CHEWABLE TABLET 81 MG: 81 TABLET CHEWABLE at 08:15

## 2021-02-20 RX ADMIN — SERTRALINE 125 MG: 25 TABLET, FILM COATED ORAL at 08:14

## 2021-02-20 RX ADMIN — DESMOPRESSIN ACETATE 40 MG: 0.2 TABLET ORAL at 21:52

## 2021-02-20 RX ADMIN — MIDODRINE HYDROCHLORIDE 10 MG: 5 TABLET ORAL at 07:06

## 2021-02-20 RX ADMIN — Medication 6 MG: at 21:52

## 2021-02-20 RX ADMIN — MIDODRINE HYDROCHLORIDE 10 MG: 5 TABLET ORAL at 22:05

## 2021-02-20 RX ADMIN — BUMETANIDE 2 MG: 1 TABLET ORAL at 08:17

## 2021-02-20 RX ADMIN — ACETAMINOPHEN 650 MG: 325 TABLET, FILM COATED ORAL at 08:17

## 2021-02-20 RX ADMIN — APIXABAN 2.5 MG: 2.5 TABLET, FILM COATED ORAL at 17:27

## 2021-02-20 RX ADMIN — Medication 1 TABLET: at 08:14

## 2021-02-20 RX ADMIN — MIDODRINE HYDROCHLORIDE 10 MG: 5 TABLET ORAL at 17:27

## 2021-02-20 RX ADMIN — POTASSIUM CHLORIDE 40 MEQ: 1500 TABLET, EXTENDED RELEASE ORAL at 11:45

## 2021-02-20 RX ADMIN — METOPROLOL TARTRATE 25 MG: 25 TABLET, FILM COATED ORAL at 07:05

## 2021-02-20 RX ADMIN — POTASSIUM CHLORIDE 20 MEQ: 14.9 INJECTION, SOLUTION INTRAVENOUS at 11:50

## 2021-02-20 RX ADMIN — Medication 20 MG: at 17:27

## 2021-02-20 NOTE — PLAN OF CARE
Problem: Prexisting or High Potential for Compromised Skin Integrity  Goal: Skin integrity is maintained or improved  Description: INTERVENTIONS:  - Identify patients at risk for skin breakdown  - Assess and monitor skin integrity  - Assess and monitor nutrition and hydration status  - Monitor labs   - Assess for incontinence   - Turn and reposition patient  - Assist with mobility/ambulation  - Relieve pressure over bony prominences  - Avoid friction and shearing  - Provide appropriate hygiene as needed including keeping skin clean and dry  - Evaluate need for skin moisturizer/barrier cream  - Collaborate with interdisciplinary team   - Patient/family teaching  - Consider wound care consult   Outcome: Progressing     Problem: Potential for Falls  Goal: Patient will remain free of falls  Description: INTERVENTIONS:  - Assess patient frequently for physical needs  -  Identify cognitive and physical deficits and behaviors that affect risk of falls  -  [place] fall precautions as indicated by assessment   - Educate patient/family on patient safety including physical limitations  - Instruct patient to call for assistance with activity based on assessment  - Modify environment to reduce risk of injury  - Consider OT/PT consult to assist with strengthening/mobility  Outcome: Progressing     Problem: Nutrition/Hydration-ADULT  Goal: Nutrient/Hydration intake appropriate for improving, restoring or maintaining nutritional needs  Description: Monitor and assess patient's nutrition/hydration status for malnutrition  Collaborate with interdisciplinary team and initiate plan and interventions as ordered  Monitor patient's weight and dietary intake as ordered or per policy  Utilize nutrition screening tool and intervene as necessary  Determine patient's food preferences and provide high-protein, high-caloric foods as appropriate       INTERVENTIONS:  - Monitor oral intake, urinary output, labs, and treatment plans  - Assess nutrition and hydration status and recommend course of action  - Evaluate amount of meals eaten  - Assist patient with eating if necessary   - Allow adequate time for meals  - Recommend/ encourage appropriate diets, oral nutritional supplements, and vitamin/mineral supplements  - Order, calculate, and assess calorie counts as needed  - Recommend, monitor, and adjust tube feedings and TPN/PPN based on assessed needs  - Assess need for intravenous fluids  - Provide specific nutrition/hydration education as appropriate  - Include patient/family/caregiver in decisions related to nutrition  Outcome: Progressing     Problem: NEUROSENSORY - ADULT  Goal: Achieves stable or improved neurological status  Description: INTERVENTIONS  - Monitor and report changes in neurological status  - Monitor vital signs such as temperature, blood pressure, glucose, and any other labs ordered   - Initiate measures to prevent increased intracranial pressure  - Monitor for seizure activity and implement precautions if appropriate      Outcome: Progressing  Goal: Achieves maximal functionality and self care  Description: INTERVENTIONS  - Monitor swallowing and airway patency with patient fatigue and changes in neurological status  - Encourage and assist patient to increase activity and self care     - Encourage visually impaired, hearing impaired and aphasic patients to use assistive/communication devices  Outcome: Progressing     Problem: CARDIOVASCULAR - ADULT  Goal: Maintains optimal cardiac output and hemodynamic stability  Description: INTERVENTIONS:  - Monitor I/O, vital signs and rhythm  - Monitor for S/S and trends of decreased cardiac output  - Administer and titrate ordered vasoactive medications to optimize hemodynamic stability  - Assess quality of pulses, skin color and temperature  - Assess for signs of decreased coronary artery perfusion  - Instruct patient to report change in severity of symptoms  Outcome: Progressing  Goal: Absence of cardiac dysrhythmias or at baseline rhythm  Description: INTERVENTIONS:  - Continuous cardiac monitoring, vital signs, obtain 12 lead EKG if ordered  - Administer antiarrhythmic and heart rate control medications as ordered  - Monitor electrolytes and administer replacement therapy as ordered  Outcome: Progressing     Problem: RESPIRATORY - ADULT  Goal: Achieves optimal ventilation and oxygenation  Description: INTERVENTIONS:  - Assess for changes in respiratory status  - Assess for changes in mentation and behavior  - Position to facilitate oxygenation and minimize respiratory effort  - Oxygen administered by appropriate delivery if ordered  - Initiate smoking cessation education as indicated  - Encourage broncho-pulmonary hygiene including cough, deep breathe, Incentive Spirometry  - Assess the need for suctioning and aspirate as needed  - Assess and instruct to report SOB or any respiratory difficulty  - Respiratory Therapy support as indicated  Outcome: Progressing     Problem: GENITOURINARY - ADULT  Goal: Maintains or returns to baseline urinary function  Description: INTERVENTIONS:  - Assess urinary function  - Encourage oral fluids to ensure adequate hydration if ordered  - Administer IV fluids as ordered to ensure adequate hydration  - Administer ordered medications as needed  - Offer frequent toileting  - Follow urinary retention protocol if ordered  Outcome: Progressing  Goal: Absence of urinary retention  Description: INTERVENTIONS:  - Assess patients ability to void and empty bladder  - Monitor I/O  - Bladder scan as needed  - Discuss with physician/AP medications to alleviate retention as needed  - Discuss catheterization for long term situations as appropriate  Outcome: Progressing  Goal: Urinary catheter remains patent  Description: INTERVENTIONS:  - Assess patency of urinary catheter  - If patient has a chronic bo, consider changing catheter if non-functioning  - Follow guidelines for intermittent irrigation of non-functioning urinary catheter  Outcome: Progressing     Problem: METABOLIC, FLUID AND ELECTROLYTES - ADULT  Goal: Electrolytes maintained within normal limits  Description: INTERVENTIONS:  - Monitor labs and assess patient for signs and symptoms of electrolyte imbalances  - Administer electrolyte replacement as ordered  - Monitor response to electrolyte replacements, including repeat lab results as appropriate  - Instruct patient on fluid and nutrition as appropriate  Outcome: Progressing  Goal: Fluid balance maintained  Description: INTERVENTIONS:  - Monitor labs   - Monitor I/O and WT  - Instruct patient on fluid and nutrition as appropriate  - Assess for signs & symptoms of volume excess or deficit  Outcome: Progressing  Goal: Glucose maintained within target range  Description: INTERVENTIONS:  - Monitor Blood Glucose as ordered  - Assess for signs and symptoms of hyperglycemia and hypoglycemia  - Administer ordered medications to maintain glucose within target range  - Assess nutritional intake and initiate nutrition service referral as needed  Outcome: Progressing     Problem: MUSCULOSKELETAL - ADULT  Goal: Maintain or return mobility to safest level of function  Description: INTERVENTIONS:  - Assess patient's ability to carry out ADLs; assess patient's baseline for ADL function and identify physical deficits which impact ability to perform ADLs (bathing, care of mouth/teeth, toileting, grooming, dressing, etc )  - Assess/evaluate cause of self-care deficits   - Assess range of motion  - Assess patient's mobility  - Assess patient's need for assistive devices and provide as appropriate  - Encourage maximum independence but intervene and supervise when necessary  - Involve family in performance of ADLs  - Assess for home care needs following discharge   - Consider OT consult to assist with ADL evaluation and planning for discharge  - Provide patient education as appropriate  Outcome: Progressing  Goal: Maintain proper alignment of affected body part  Description: INTERVENTIONS:  - Support, maintain and protect limb and body alignment  - Provide patient/ family with appropriate education  Outcome: Progressing     Problem: INFECTION - ADULT  Goal: Absence or prevention of progression during hospitalization  Description: INTERVENTIONS:  - Assess and monitor for signs and symptoms of infection  - Monitor lab/diagnostic results  - Monitor all insertion sites, i e  indwelling lines, tubes, and drains  - Monitor endotracheal if appropriate and nasal secretions for changes in amount and color  - [place] appropriate cooling/warming therapies per order  - Administer medications as ordered  - Instruct and encourage patient and family to use good hand hygiene technique  - Identify and instruct in appropriate isolation precautions for identified infection/condition  Outcome: Progressing     Problem: DISCHARGE PLANNING  Goal: Discharge to home or other facility with appropriate resources  Description: INTERVENTIONS:  - Identify barriers to discharge w/patient and caregiver  - Arrange for needed discharge resources and transportation as appropriate  - Identify discharge learning needs (meds, wound care, etc )  - Arrange for interpretive services to assist at discharge as needed  - Refer to Case Management Department for coordinating discharge planning if the patient needs post-hospital services based on physician/advanced practitioner order or complex needs related to functional status, cognitive ability, or social support system  Outcome: Progressing     Problem: Knowledge Deficit  Goal: Patient/family/caregiver demonstrates understanding of disease process, treatment plan, medications, and discharge instructions  Description: Complete learning assessment and assess knowledge base    Interventions:  - Provide teaching at level of understanding  - Provide teaching via preferred learning methods  Outcome: Progressing     Problem: SAFETY,RESTRAINT: NV/NON-SELF DESTRUCTIVE BEHAVIOR  Goal: Remains free of harm/injury (restraint for non violent/non self-detsructive behavior)  Description: INTERVENTIONS:  - Instruct patient/family regarding restraint use   - Assess and monitor physiologic and psychological status   - Provide interventions and comfort measures to meet assessed patient needs   - Identify and implement measures to help patient regain control  - Assess readiness for release of restraint   Outcome: Progressing  Goal: Returns to optimal restraint-free functioning  Description: INTERVENTIONS:  - Assess the patient's behavior and symptoms that indicate continued need for restraint  - Identify and implement measures to help patient regain control  - Assess readiness for release of restraint   Outcome: Progressing

## 2021-02-20 NOTE — PROGRESS NOTES
Patient resting in bed  There are no visible signs or symptoms of distress noted at this time  Bed low and locked  Call bell within reach  Will continue to monitor      Elenita Schuler, RN

## 2021-02-20 NOTE — PLAN OF CARE
Problem: Prexisting or High Potential for Compromised Skin Integrity  Goal: Skin integrity is maintained or improved  Description: INTERVENTIONS:  - Identify patients at risk for skin breakdown  - Assess and monitor skin integrity  - Assess and monitor nutrition and hydration status  - Monitor labs   - Assess for incontinence   - Turn and reposition patient  - Assist with mobility/ambulation  - Relieve pressure over bony prominences  - Avoid friction and shearing  - Provide appropriate hygiene as needed including keeping skin clean and dry  - Evaluate need for skin moisturizer/barrier cream  - Collaborate with interdisciplinary team   - Patient/family teaching  - Consider wound care consult   Outcome: Progressing     Problem: Potential for Falls  Goal: Patient will remain free of falls  Description: INTERVENTIONS:  - Assess patient frequently for physical needs  -  Identify cognitive and physical deficits and behaviors that affect risk of falls    -  [place] fall precautions as indicated by assessment   - Educate patient/family on patient safety including physical limitations  - Instruct patient to call for assistance with activity based on assessment  - Modify environment to reduce risk of injury  - Consider OT/PT consult to assist with strengthening/mobility  Outcome: Progressing     Problem: SAFETY,RESTRAINT: NV/NON-SELF DESTRUCTIVE BEHAVIOR  Goal: Remains free of harm/injury (restraint for non violent/non self-detsructive behavior)  Description: INTERVENTIONS:  - Instruct patient/family regarding restraint use   - Assess and monitor physiologic and psychological status   - Provide interventions and comfort measures to meet assessed patient needs   - Identify and implement measures to help patient regain control  - Assess readiness for release of restraint   Outcome: Progressing  Goal: Returns to optimal restraint-free functioning  Description: INTERVENTIONS:  - Assess the patient's behavior and symptoms that indicate continued need for restraint  - Identify and implement measures to help patient regain control  - Assess readiness for release of restraint   Outcome: Progressing     Problem: Nutrition/Hydration-ADULT  Goal: Nutrient/Hydration intake appropriate for improving, restoring or maintaining nutritional needs  Description: Monitor and assess patient's nutrition/hydration status for malnutrition  Collaborate with interdisciplinary team and initiate plan and interventions as ordered  Monitor patient's weight and dietary intake as ordered or per policy  Utilize nutrition screening tool and intervene as necessary  Determine patient's food preferences and provide high-protein, high-caloric foods as appropriate       INTERVENTIONS:  - Monitor oral intake, urinary output, labs, and treatment plans  - Assess nutrition and hydration status and recommend course of action  - Evaluate amount of meals eaten  - Assist patient with eating if necessary   - Allow adequate time for meals  - Recommend/ encourage appropriate diets, oral nutritional supplements, and vitamin/mineral supplements  - Order, calculate, and assess calorie counts as needed  - Recommend, monitor, and adjust tube feedings and TPN/PPN based on assessed needs  - Assess need for intravenous fluids  - Provide specific nutrition/hydration education as appropriate  - Include patient/family/caregiver in decisions related to nutrition  Outcome: Progressing     Problem: NEUROSENSORY - ADULT  Goal: Achieves stable or improved neurological status  Description: INTERVENTIONS  - Monitor and report changes in neurological status  - Monitor vital signs such as temperature, blood pressure, glucose, and any other labs ordered   - Initiate measures to prevent increased intracranial pressure  - Monitor for seizure activity and implement precautions if appropriate      Outcome: Progressing  Goal: Achieves maximal functionality and self care  Description: INTERVENTIONS  - Monitor swallowing and airway patency with patient fatigue and changes in neurological status  - Encourage and assist patient to increase activity and self care     - Encourage visually impaired, hearing impaired and aphasic patients to use assistive/communication devices  Outcome: Progressing     Problem: CARDIOVASCULAR - ADULT  Goal: Maintains optimal cardiac output and hemodynamic stability  Description: INTERVENTIONS:  - Monitor I/O, vital signs and rhythm  - Monitor for S/S and trends of decreased cardiac output  - Administer and titrate ordered vasoactive medications to optimize hemodynamic stability  - Assess quality of pulses, skin color and temperature  - Assess for signs of decreased coronary artery perfusion  - Instruct patient to report change in severity of symptoms  Outcome: Progressing  Goal: Absence of cardiac dysrhythmias or at baseline rhythm  Description: INTERVENTIONS:  - Continuous cardiac monitoring, vital signs, obtain 12 lead EKG if ordered  - Administer antiarrhythmic and heart rate control medications as ordered  - Monitor electrolytes and administer replacement therapy as ordered  Outcome: Progressing     Problem: RESPIRATORY - ADULT  Goal: Achieves optimal ventilation and oxygenation  Description: INTERVENTIONS:  - Assess for changes in respiratory status  - Assess for changes in mentation and behavior  - Position to facilitate oxygenation and minimize respiratory effort  - Oxygen administered by appropriate delivery if ordered  - Initiate smoking cessation education as indicated  - Encourage broncho-pulmonary hygiene including cough, deep breathe, Incentive Spirometry  - Assess the need for suctioning and aspirate as needed  - Assess and instruct to report SOB or any respiratory difficulty  - Respiratory Therapy support as indicated  Outcome: Progressing     Problem: GENITOURINARY - ADULT  Goal: Maintains or returns to baseline urinary function  Description: INTERVENTIONS:  - Assess urinary function  - Encourage oral fluids to ensure adequate hydration if ordered  - Administer IV fluids as ordered to ensure adequate hydration  - Administer ordered medications as needed  - Offer frequent toileting  - Follow urinary retention protocol if ordered  Outcome: Progressing  Goal: Absence of urinary retention  Description: INTERVENTIONS:  - Assess patients ability to void and empty bladder  - Monitor I/O  - Bladder scan as needed  - Discuss with physician/AP medications to alleviate retention as needed  - Discuss catheterization for long term situations as appropriate  Outcome: Progressing  Goal: Urinary catheter remains patent  Description: INTERVENTIONS:  - Assess patency of urinary catheter  - If patient has a chronic bo, consider changing catheter if non-functioning  - Follow guidelines for intermittent irrigation of non-functioning urinary catheter  Outcome: Progressing

## 2021-02-21 LAB
ALBUMIN SERPL BCP-MCNC: 2.7 G/DL (ref 3.5–5)
ALP SERPL-CCNC: 71 U/L (ref 46–116)
ALT SERPL W P-5'-P-CCNC: 31 U/L (ref 12–78)
ANION GAP SERPL CALCULATED.3IONS-SCNC: 9 MMOL/L (ref 4–13)
AST SERPL W P-5'-P-CCNC: 22 U/L (ref 5–45)
BASOPHILS # BLD AUTO: 0.05 THOUSANDS/ΜL (ref 0–0.1)
BASOPHILS NFR BLD AUTO: 1 % (ref 0–1)
BILIRUB SERPL-MCNC: 0.52 MG/DL (ref 0.2–1)
BUN SERPL-MCNC: 33 MG/DL (ref 5–25)
CALCIUM ALBUM COR SERPL-MCNC: 9.7 MG/DL (ref 8.3–10.1)
CALCIUM SERPL-MCNC: 8.7 MG/DL (ref 8.3–10.1)
CHLORIDE SERPL-SCNC: 112 MMOL/L (ref 100–108)
CO2 SERPL-SCNC: 25 MMOL/L (ref 21–32)
CREAT SERPL-MCNC: 1.23 MG/DL (ref 0.6–1.3)
EOSINOPHIL # BLD AUTO: 0.93 THOUSAND/ΜL (ref 0–0.61)
EOSINOPHIL NFR BLD AUTO: 10 % (ref 0–6)
ERYTHROCYTE [DISTWIDTH] IN BLOOD BY AUTOMATED COUNT: 25.8 % (ref 11.6–15.1)
GFR SERPL CREATININE-BSD FRML MDRD: 63 ML/MIN/1.73SQ M
GLUCOSE SERPL-MCNC: 100 MG/DL (ref 65–140)
GLUCOSE SERPL-MCNC: 102 MG/DL (ref 65–140)
GLUCOSE SERPL-MCNC: 107 MG/DL (ref 65–140)
GLUCOSE SERPL-MCNC: 110 MG/DL (ref 65–140)
GLUCOSE SERPL-MCNC: 98 MG/DL (ref 65–140)
HCT VFR BLD AUTO: 31 % (ref 36.5–49.3)
HGB BLD-MCNC: 8.4 G/DL (ref 12–17)
IMM GRANULOCYTES # BLD AUTO: 0.17 THOUSAND/UL (ref 0–0.2)
IMM GRANULOCYTES NFR BLD AUTO: 2 % (ref 0–2)
LYMPHOCYTES # BLD AUTO: 0.65 THOUSANDS/ΜL (ref 0.6–4.47)
LYMPHOCYTES NFR BLD AUTO: 7 % (ref 14–44)
MCH RBC QN AUTO: 24.3 PG (ref 26.8–34.3)
MCHC RBC AUTO-ENTMCNC: 27.1 G/DL (ref 31.4–37.4)
MCV RBC AUTO: 90 FL (ref 82–98)
MONOCYTES # BLD AUTO: 0.77 THOUSAND/ΜL (ref 0.17–1.22)
MONOCYTES NFR BLD AUTO: 8 % (ref 4–12)
NEUTROPHILS # BLD AUTO: 6.63 THOUSANDS/ΜL (ref 1.85–7.62)
NEUTS SEG NFR BLD AUTO: 72 % (ref 43–75)
NRBC BLD AUTO-RTO: 0 /100 WBCS
PLATELET # BLD AUTO: 205 THOUSANDS/UL (ref 149–390)
PMV BLD AUTO: 10.8 FL (ref 8.9–12.7)
POTASSIUM SERPL-SCNC: 3.4 MMOL/L (ref 3.5–5.3)
PROT SERPL-MCNC: 6.8 G/DL (ref 6.4–8.2)
RBC # BLD AUTO: 3.46 MILLION/UL (ref 3.88–5.62)
SODIUM SERPL-SCNC: 146 MMOL/L (ref 136–145)
WBC # BLD AUTO: 9.2 THOUSAND/UL (ref 4.31–10.16)

## 2021-02-21 PROCEDURE — 80053 COMPREHEN METABOLIC PANEL: CPT | Performed by: FAMILY MEDICINE

## 2021-02-21 PROCEDURE — 99232 SBSQ HOSP IP/OBS MODERATE 35: CPT | Performed by: FAMILY MEDICINE

## 2021-02-21 PROCEDURE — 94760 N-INVAS EAR/PLS OXIMETRY 1: CPT

## 2021-02-21 PROCEDURE — 94660 CPAP INITIATION&MGMT: CPT

## 2021-02-21 PROCEDURE — 82948 REAGENT STRIP/BLOOD GLUCOSE: CPT

## 2021-02-21 PROCEDURE — 85025 COMPLETE CBC W/AUTO DIFF WBC: CPT | Performed by: FAMILY MEDICINE

## 2021-02-21 RX ORDER — BUMETANIDE 1 MG/1
2 TABLET ORAL DAILY
Status: DISCONTINUED | OUTPATIENT
Start: 2021-02-22 | End: 2021-02-28

## 2021-02-21 RX ADMIN — METOPROLOL TARTRATE 25 MG: 25 TABLET, FILM COATED ORAL at 22:45

## 2021-02-21 RX ADMIN — LEVETIRACETAM 750 MG: 500 TABLET, FILM COATED ORAL at 08:50

## 2021-02-21 RX ADMIN — BUMETANIDE 2 MG: 0.25 INJECTION INTRAMUSCULAR; INTRAVENOUS at 09:11

## 2021-02-21 RX ADMIN — POTASSIUM CHLORIDE 40 MEQ: 1500 TABLET, EXTENDED RELEASE ORAL at 12:14

## 2021-02-21 RX ADMIN — MIDODRINE HYDROCHLORIDE 10 MG: 5 TABLET ORAL at 22:07

## 2021-02-21 RX ADMIN — SERTRALINE 125 MG: 25 TABLET, FILM COATED ORAL at 08:49

## 2021-02-21 RX ADMIN — Medication 2000 UNITS: at 08:49

## 2021-02-21 RX ADMIN — ASPIRIN 81 MG CHEWABLE TABLET 81 MG: 81 TABLET CHEWABLE at 08:50

## 2021-02-21 RX ADMIN — APIXABAN 2.5 MG: 2.5 TABLET, FILM COATED ORAL at 08:49

## 2021-02-21 RX ADMIN — MIDODRINE HYDROCHLORIDE 10 MG: 5 TABLET ORAL at 08:50

## 2021-02-21 RX ADMIN — MIDODRINE HYDROCHLORIDE 10 MG: 5 TABLET ORAL at 14:13

## 2021-02-21 RX ADMIN — POTASSIUM CHLORIDE 40 MEQ: 1500 TABLET, EXTENDED RELEASE ORAL at 08:49

## 2021-02-21 RX ADMIN — DESMOPRESSIN ACETATE 40 MG: 0.2 TABLET ORAL at 22:08

## 2021-02-21 RX ADMIN — METOPROLOL TARTRATE 25 MG: 25 TABLET, FILM COATED ORAL at 08:49

## 2021-02-21 RX ADMIN — LEVETIRACETAM 750 MG: 500 TABLET, FILM COATED ORAL at 22:07

## 2021-02-21 RX ADMIN — Medication 1 TABLET: at 08:49

## 2021-02-21 RX ADMIN — APIXABAN 2.5 MG: 2.5 TABLET, FILM COATED ORAL at 17:48

## 2021-02-21 RX ADMIN — Medication 6 MG: at 22:08

## 2021-02-21 RX ADMIN — Medication 20 MG: at 06:00

## 2021-02-21 RX ADMIN — Medication 20 MG: at 14:13

## 2021-02-21 RX ADMIN — POTASSIUM CHLORIDE 40 MEQ: 1500 TABLET, EXTENDED RELEASE ORAL at 17:47

## 2021-02-21 NOTE — PLAN OF CARE
Problem: Prexisting or High Potential for Compromised Skin Integrity  Goal: Skin integrity is maintained or improved  Description: INTERVENTIONS:  - Identify patients at risk for skin breakdown  - Assess and monitor skin integrity  - Assess and monitor nutrition and hydration status  - Monitor labs   - Assess for incontinence   - Turn and reposition patient  - Assist with mobility/ambulation  - Relieve pressure over bony prominences  - Avoid friction and shearing  - Provide appropriate hygiene as needed including keeping skin clean and dry  - Evaluate need for skin moisturizer/barrier cream  - Collaborate with interdisciplinary team   - Patient/family teaching  - Consider wound care consult   Outcome: Progressing     Problem: Potential for Falls  Goal: Patient will remain free of falls  Description: INTERVENTIONS:  - Assess patient frequently for physical needs  -  Identify cognitive and physical deficits and behaviors that affect risk of falls    -  [place] fall precautions as indicated by assessment   - Educate patient/family on patient safety including physical limitations  - Instruct patient to call for assistance with activity based on assessment  - Modify environment to reduce risk of injury  - Consider OT/PT consult to assist with strengthening/mobility  Outcome: Progressing     Problem: SAFETY,RESTRAINT: NV/NON-SELF DESTRUCTIVE BEHAVIOR  Goal: Remains free of harm/injury (restraint for non violent/non self-detsructive behavior)  Description: INTERVENTIONS:  - Instruct patient/family regarding restraint use   - Assess and monitor physiologic and psychological status   - Provide interventions and comfort measures to meet assessed patient needs   - Identify and implement measures to help patient regain control  - Assess readiness for release of restraint   Outcome: Progressing  Goal: Returns to optimal restraint-free functioning  Description: INTERVENTIONS:  - Assess the patient's behavior and symptoms that indicate continued need for restraint  - Identify and implement measures to help patient regain control  - Assess readiness for release of restraint   Outcome: Progressing     Problem: Nutrition/Hydration-ADULT  Goal: Nutrient/Hydration intake appropriate for improving, restoring or maintaining nutritional needs  Description: Monitor and assess patient's nutrition/hydration status for malnutrition  Collaborate with interdisciplinary team and initiate plan and interventions as ordered  Monitor patient's weight and dietary intake as ordered or per policy  Utilize nutrition screening tool and intervene as necessary  Determine patient's food preferences and provide high-protein, high-caloric foods as appropriate       INTERVENTIONS:  - Monitor oral intake, urinary output, labs, and treatment plans  - Assess nutrition and hydration status and recommend course of action  - Evaluate amount of meals eaten  - Assist patient with eating if necessary   - Allow adequate time for meals  - Recommend/ encourage appropriate diets, oral nutritional supplements, and vitamin/mineral supplements  - Order, calculate, and assess calorie counts as needed  - Recommend, monitor, and adjust tube feedings and TPN/PPN based on assessed needs  - Assess need for intravenous fluids  - Provide specific nutrition/hydration education as appropriate  - Include patient/family/caregiver in decisions related to nutrition  Outcome: Progressing     Problem: NEUROSENSORY - ADULT  Goal: Achieves stable or improved neurological status  Description: INTERVENTIONS  - Monitor and report changes in neurological status  - Monitor vital signs such as temperature, blood pressure, glucose, and any other labs ordered   - Initiate measures to prevent increased intracranial pressure  - Monitor for seizure activity and implement precautions if appropriate      Outcome: Progressing  Goal: Achieves maximal functionality and self care  Description: INTERVENTIONS  - Monitor swallowing and airway patency with patient fatigue and changes in neurological status  - Encourage and assist patient to increase activity and self care     - Encourage visually impaired, hearing impaired and aphasic patients to use assistive/communication devices  Outcome: Progressing     Problem: CARDIOVASCULAR - ADULT  Goal: Maintains optimal cardiac output and hemodynamic stability  Description: INTERVENTIONS:  - Monitor I/O, vital signs and rhythm  - Monitor for S/S and trends of decreased cardiac output  - Administer and titrate ordered vasoactive medications to optimize hemodynamic stability  - Assess quality of pulses, skin color and temperature  - Assess for signs of decreased coronary artery perfusion  - Instruct patient to report change in severity of symptoms  Outcome: Progressing  Goal: Absence of cardiac dysrhythmias or at baseline rhythm  Description: INTERVENTIONS:  - Continuous cardiac monitoring, vital signs, obtain 12 lead EKG if ordered  - Administer antiarrhythmic and heart rate control medications as ordered  - Monitor electrolytes and administer replacement therapy as ordered  Outcome: Progressing     Problem: RESPIRATORY - ADULT  Goal: Achieves optimal ventilation and oxygenation  Description: INTERVENTIONS:  - Assess for changes in respiratory status  - Assess for changes in mentation and behavior  - Position to facilitate oxygenation and minimize respiratory effort  - Oxygen administered by appropriate delivery if ordered  - Initiate smoking cessation education as indicated  - Encourage broncho-pulmonary hygiene including cough, deep breathe, Incentive Spirometry  - Assess the need for suctioning and aspirate as needed  - Assess and instruct to report SOB or any respiratory difficulty  - Respiratory Therapy support as indicated  Outcome: Progressing     Problem: GENITOURINARY - ADULT  Goal: Maintains or returns to baseline urinary function  Description: INTERVENTIONS:  - Assess urinary function  - Encourage oral fluids to ensure adequate hydration if ordered  - Administer IV fluids as ordered to ensure adequate hydration  - Administer ordered medications as needed  - Offer frequent toileting  - Follow urinary retention protocol if ordered  Outcome: Progressing  Goal: Absence of urinary retention  Description: INTERVENTIONS:  - Assess patients ability to void and empty bladder  - Monitor I/O  - Bladder scan as needed  - Discuss with physician/AP medications to alleviate retention as needed  - Discuss catheterization for long term situations as appropriate  Outcome: Progressing  Goal: Urinary catheter remains patent  Description: INTERVENTIONS:  - Assess patency of urinary catheter  - If patient has a chronic bo, consider changing catheter if non-functioning  - Follow guidelines for intermittent irrigation of non-functioning urinary catheter  Outcome: Progressing     Problem: METABOLIC, FLUID AND ELECTROLYTES - ADULT  Goal: Electrolytes maintained within normal limits  Description: INTERVENTIONS:  - Monitor labs and assess patient for signs and symptoms of electrolyte imbalances  - Administer electrolyte replacement as ordered  - Monitor response to electrolyte replacements, including repeat lab results as appropriate  - Instruct patient on fluid and nutrition as appropriate  Outcome: Progressing  Goal: Fluid balance maintained  Description: INTERVENTIONS:  - Monitor labs   - Monitor I/O and WT  - Instruct patient on fluid and nutrition as appropriate  - Assess for signs & symptoms of volume excess or deficit  Outcome: Progressing  Goal: Glucose maintained within target range  Description: INTERVENTIONS:  - Monitor Blood Glucose as ordered  - Assess for signs and symptoms of hyperglycemia and hypoglycemia  - Administer ordered medications to maintain glucose within target range  - Assess nutritional intake and initiate nutrition service referral as needed  Outcome: Progressing     Problem: MUSCULOSKELETAL - ADULT  Goal: Maintain or return mobility to safest level of function  Description: INTERVENTIONS:  - Assess patient's ability to carry out ADLs; assess patient's baseline for ADL function and identify physical deficits which impact ability to perform ADLs (bathing, care of mouth/teeth, toileting, grooming, dressing, etc )  - Assess/evaluate cause of self-care deficits   - Assess range of motion  - Assess patient's mobility  - Assess patient's need for assistive devices and provide as appropriate  - Encourage maximum independence but intervene and supervise when necessary  - Involve family in performance of ADLs  - Assess for home care needs following discharge   - Consider OT consult to assist with ADL evaluation and planning for discharge  - Provide patient education as appropriate  Outcome: Progressing  Goal: Maintain proper alignment of affected body part  Description: INTERVENTIONS:  - Support, maintain and protect limb and body alignment  - Provide patient/ family with appropriate education  Outcome: Progressing     Problem: INFECTION - ADULT  Goal: Absence or prevention of progression during hospitalization  Description: INTERVENTIONS:  - Assess and monitor for signs and symptoms of infection  - Monitor lab/diagnostic results  - Monitor all insertion sites, i e  indwelling lines, tubes, and drains  - Monitor endotracheal if appropriate and nasal secretions for changes in amount and color  - [place] appropriate cooling/warming therapies per order  - Administer medications as ordered  - Instruct and encourage patient and family to use good hand hygiene technique  - Identify and instruct in appropriate isolation precautions for identified infection/condition  Outcome: Progressing     Problem: DISCHARGE PLANNING  Goal: Discharge to home or other facility with appropriate resources  Description: INTERVENTIONS:  - Identify barriers to discharge w/patient and caregiver  - Arrange for needed discharge resources and transportation as appropriate  - Identify discharge learning needs (meds, wound care, etc )  - Arrange for interpretive services to assist at discharge as needed  - Refer to Case Management Department for coordinating discharge planning if the patient needs post-hospital services based on physician/advanced practitioner order or complex needs related to functional status, cognitive ability, or social support system  Outcome: Progressing

## 2021-02-21 NOTE — PROGRESS NOTES
Progress Note - Lolis Almanza 1960, 63 y o  male MRN: [id number]    Unit/Bed#: S -01 Encounter: [id number]    Primary Care Provider: Cherri Wheeler DO   Date and time admitted to hospital: 1/17/2021  4:16 PM      Assessment/Plan:  Diarrhea of presumed infectious origin  Assessment & Plan  Present for multiple days prior to transfer to [address] [place] [address]  Bowel regimen held 3 days ago and diarrhea persisted  C  Diff negative  Continue to monitor    Pneumonia due to Escherichia coli [facility])  Assessment & Plan  ESBL PNA superimposed on COVID-19 PNA s/p Ertapenum 10 day course completed on 2/17  Plan  · Continue to monitor off abx  · Trend WBC and fever curves    History of stroke  Assessment & Plan  History of stroke 1 year ago  Now baseline resident at [facility]  Plan  · Continue Eliquis daily  · Continue ASA daily    ? Eliquis daily  ? Asa daily       Seizure [facility])  Assessment & Plan  Patient does not have a seizure history but does have history of CVA in the past  Neurology consulted and appreciate input, have signed off with recs for plan as below    Plan  · Continue on Keppra 750mg BID  · EEG 2/2/21 Background activities are too slow suggesting moderate diffuse cerebral dysfunction of nonspecific etiology  · Seizure precautions    Hypernatremia  Assessment & Plan  Patient previously being followed by Nephrology, was on D5W which was d/c as of 2/17  Plan  · Na remains elevated, 148 this am  · Will continue to monitor with BMP      Anemia  Assessment & Plan  · No active bleeding  · Trend CBC daily   · hgb stable    · Transfuse for Hgb < 7 0     Acute kidney injury superimposed on CKD [facility])  Assessment & Plan  POA, likely multifactorial 2/2 ATN in setting of COVID-19 + contrast induced nephropathy + vanco toxicity   Baseline creatinine 1 0-1 3    Plan    · Turner catheter in place  · Monitor I/Os  · Currently on Bumex 2 mg twice daily  · Monitor renal indices    Atrial fibrillation ([address] )  Assessment & Plan  · Continue eliquis 2 5mg BID  · Holding cardizem secondary to hypotension  · Continue metoprolol 25mg BID    Depression  Assessment & Plan  · Continue sertraline 125 mg   · Per psych eval-patient is not suicidal, does not require 1:1  · Required to return to nursing facility    Morbid obesity   Assessment & Plan  · Continue work with PT/OT  · Recommend therapeutic lifestyle changes to promote weight loss    Dysphagia  Assessment & Plan  · Tolerating puree and nectar thick liquids by straw w/o overt dysphagia  · Dysphagia-2 diet with NT per speech pathology    Acute metabolic encephalopathy  Assessment & Plan  Secondary to hypoxia and hypercapnia with history of stroke and residual left-sided weakness, patient initially was intubated at [facility] for both hypoxia and hypercapnia and was extubated in ICU here at [facility]  Mental status at baseline    Plan  · Pysch eval pending-appreciate recs  · Continue to monitor for s/sx of depression  · Monitor for delirium      History of aortic valve replacement with bioprosthetic valve  Assessment & Plan  · Echo 1/2021 - EF 55%, bioprosthetic AV with gradient of 16  · Murmur present on exam today  · Hold home cardizem    Hypokalemia  Assessment & Plan  · Continue telemetry  · Hypokalemia this am at 3 0  · Restarted Kdur 40mg BID  · Continue to replace PRN  · Monitor with BMP    Hyperlipidemia  Assessment & Plan  · Continue statin    Essential hypertension  Assessment & Plan  · Hold home cardizem in setting of hypotension  · Continue metoprolol 25mg BID for rate control  · BP's stable on Midodrine TID  · 2/19 overnight: 80/50, refused PO midodrine  Received albumin x1 with improvement  * Acute respiratory failure with hypoxia and hypercarbia (HCC)  Assessment & Plan  POA acute resp failure with hypoxia and hypercarbia secondary to covid-19 with superimposed ESBL e  Coli PNA   Patient with complicated trajectory originally brought from [facility] ED on 1/17 due to respiratory distress after undergoing emergent intubation  Pt transfered to [facility] and successfully extubated on 1/18  Unfortunately, patient suffered a during this admission likely secondary to existing CVA and was re-intubated on 2/1 and re-extubated on 2/16  Plan:   · Currently on 5L NC satting 93-95%, titrate to maintain SPO2 >88%  · Pulm following- overnight pulse ox and morning ABG 24 hours prior to discharge (only if d/c to private home)  · Continue BiPAP q h s  and for naps  · Will need formal sleep study as an outpatient      Diet: Fluid restriction/sodium restriction  VTE Pharmacologic Prophylaxis: Eliquis  VTE Mechanical Prophylaxis: sequential compression device   Code status: Full code  Disposition: Likely discharge 2/22/21      Subjective:   Patient was seen and examined at bedside  No acute complaints, wants to go home  Denies pain, eating and drinking well      Overnight events: None      Current Facility-Administered Medications   Medication Dose Route Frequency    acetaminophen (TYLENOL) tablet 650 mg  650 mg Oral Q6H PRN    apixaban (ELIQUIS) tablet 2 5 mg  2 5 mg Oral BID    aspirin chewable tablet 81 mg  81 mg Oral Daily    atorvastatin (LIPITOR) tablet 40 mg  40 mg Per NG Tube HS    cholecalciferol (VITAMIN D3) tablet 2,000 Units  2,000 Units Per NG Tube Daily    insulin lispro (HumaLOG) 100 units/mL subcutaneous injection 1-5 Units  1-5 Units Subcutaneous HS    insulin lispro (HumaLOG) 100 units/mL subcutaneous injection 2-12 Units  2-12 Units Subcutaneous TID AC    levETIRAcetam (KEPPRA) tablet 750 mg  750 mg Oral Q12H [address]    melatonin tablet 6 mg  6 mg Oral HS    metoprolol tartrate (LOPRESSOR) tablet 25 mg  25 mg Oral Q12H ARACELIS    midodrine (PROAMATINE) tablet 10 mg  10 mg Oral Q8H    multivitamin-minerals (CENTRUM) tablet 1 tablet  1 tablet Oral Daily    omeprazole (PRILOSEC) suspension 2 mg/mL  20 mg Oral Q12H    potassium chloride (K-DUR,KLOR-CON) CR tablet 40 mEq  40 mEq Oral TID With Meals    sertraline (ZOLOFT) tablet 125 mg  125 mg Oral Daily     Allergies   Allergen Reactions    Amiodarone      Developed Rash    Penicillins Rash     However, has subsequently tolerated Cefazolin and Cefepime       Objective:   Vitals:   02/20/21 2242   BP:    BP Location:    Pulse:    Resp:    Temp:    TempSrc:    SpO2: 95%   Weight:    Height:          Intake/Output Summary (Last 24 hours) at 2/21/2021 1010  Last data filed at 2/21/2021 0612  Gross per 24 hour   Intake 780 ml   Output 1925 ml   Net -1145 ml       Physical Exam:   Physical Exam  Vitals signs reviewed  Constitutional:       Appearance: He is obese  Cardiovascular:      Rate and Rhythm: Normal rate  Rhythm irregular  Pulmonary:      Comments: Crackles at B/L bases  Abdominal:      General: Bowel sounds are normal       Palpations: Abdomen is soft  Genitourinary:     Comments: Rectal tube in place  Musculoskeletal:      Right lower leg: Edema present  Left lower leg: Edema present  Skin:     General: Skin is warm and dry  Neurological:      Mental Status: He is alert  Mental status is at baseline  Psychiatric:      Comments: Patient mildly agitated today but redirectable         Invasive Devices     Peripheral Intravenous Line            Peripheral IV 02/19/21 Distal;Dorsal (posterior); Right Forearm 2 days          Drain            Urethral Catheter Straight-tip 16 Fr  34 days    Rectal Tube With balloon 8 days                Labs:   CBC and differential    Collection Time: 02/20/21  4:37 AM   Result Value Ref Range    WBC 7 08 4 31 - 10 16 Thousand/uL    RBC 3 16 (L) 3 88 - 5 62 Million/uL    Hemoglobin 7 9 (L) 12 0 - 17 0 g/dL    Hematocrit 28 6 (L) 36 5 - 49 3 %    MCV 91 82 - 98 fL    MCH 25 0 (L) 26 8 - 34 3 pg    MCHC 27 6 (L) 31 4 - 37 4 g/dL    RDW 26 0 (H) 11 6 - 15 1 %    MPV 11 4 8 9 - 12 7 fL    Platelets 884 923 - 187 Thousands/uL    nRBC 0 /100 WBCs    Neutrophils Relative 68 43 - 75 % Immat GRANS % 2 0 - 2 %    Lymphocytes Relative 9 (L) 14 - 44 %    Monocytes Relative 9 4 - 12 %    Eosinophils Relative 12 (H) 0 - 6 %    Basophils Relative 0 0 - 1 %    Neutrophils Absolute 4 89 1 85 - 7 62 Thousands/µL    Immature Grans Absolute 0 11 0 00 - 0 20 Thousand/uL    Lymphocytes Absolute 0 61 0 60 - 4 47 Thousands/µL    Monocytes Absolute 0 62 0 17 - 1 22 Thousand/µL    Eosinophils Absolute 0 82 (H) 0 00 - 0 61 Thousand/µL    Basophils Absolute 0 03 0 00 - 0 10 Thousands/µL   Comprehensive metabolic panel    Collection Time: 02/20/21  4:37 AM   Result Value Ref Range    Sodium 148 (H) 136 - 145 mmol/L    Potassium 3 1 (L) 3 5 - 5 3 mmol/L    Chloride 112 (H) 100 - 108 mmol/L    CO2 27 21 - 32 mmol/L    ANION GAP 9 4 - 13 mmol/L    BUN 35 (H) 5 - 25 mg/dL    Creatinine 1 25 0 60 - 1 30 mg/dL    Glucose 97 65 - 140 mg/dL    Calcium 8 5 8 3 - 10 1 mg/dL    Corrected Calcium 9 5 8 3 - 10 1 mg/dL    AST 19 5 - 45 U/L    ALT 28 12 - 78 U/L    Alkaline Phosphatase 68 46 - 116 U/L    Total Protein 6 5 6 4 - 8 2 g/dL    Albumin 2 7 (L) 3 5 - 5 0 g/dL    Total Bilirubin 0 54 0 20 - 1 00 mg/dL    eGFR 62 ml/min/1 73sq m   NT-BNP PRO    Collection Time: 02/20/21  4:37 AM   Result Value Ref Range    NT-proBNP 2,167 (H) <125 pg/mL   Magnesium    Collection Time: 02/20/21  4:37 AM   Result Value Ref Range    Magnesium 2 1 1 6 - 2 6 mg/dL   Fingerstick Glucose (POCT)    Collection Time: 02/20/21  8:10 AM   Result Value Ref Range    POC Glucose 122 65 - 140 mg/dl   Fingerstick Glucose (POCT)    Collection Time: 02/20/21 10:35 AM   Result Value Ref Range    POC Glucose 105 65 - 140 mg/dl   Fingerstick Glucose (POCT)    Collection Time: 02/20/21  3:19 PM   Result Value Ref Range    POC Glucose 87 65 - 140 mg/dl   Fingerstick Glucose (POCT)    Collection Time: 02/20/21  9:47 PM   Result Value Ref Range    POC Glucose 110 65 - 140 mg/dl         Imaging/Other:    XR abdomen 1 view kub   Final Result by Lida Duckworth MD (02/15 1108)      1  Feeding tube tip overlies the mid to distal stomach  Workstation performed: [id number]         XR chest portable ICU   Final Result by Dusty Trejo MD (57/33 8343)      1  Stable extensive opacities throughout both lungs and hypoventilatory changes  2   Lines and tubes appear well positioned, including the visualized portion of the enteric tube  Workstation performed: [id number]         XR chest portable ICU   Final Result by Claudean Holes, MD (02/11 1454)      Slight improvement of aeration of the left lung  Otherwise, no significant change of bilateral infiltrates  Workstation performed: [id number]         XR chest portable ICU   Final Result by Marla Crespo MD (02/05 1730)      No pneumothorax or central line attempt  No change in severe bilateral consolidation, greater on the left, related to Covid 19 pneumonia  Workstation performed: [id number]         XR chest portable ICU   Final Result by Marla Crespo MD (02/04 1341)      No change in left greater than right consolidation related to Covid 19 pneumonia  Workstation performed: [id number]         CT head wo contrast   Final Result by Maria T Wallis DO (02/03 0901)      Stable old infarcts and chronic microangiopathic change  No mass, hemorrhage or acute ischemia  Workstation performed: [id number]         VAS lower limb venous duplex study, complete bilateral   Final Result by Felicita Vargas DO (02/03 5291)      CT head wo contrast   Final Result by Sherita Mariscal DO (02/01 2316)      No acute intracranial abnormality  Stable areas of dense place as described above  Workstation performed: [id number]         XR chest portable ICU   Final Result by Jessie Jiang DO (02/01 1916)   Viral pneumonia related to Covid 19  Satisfactory endotracheal tube and nasogastric tube placement  Workstation performed: [id number]         XR chest portable   Final Result by eKnneth Perez MD (02/01 7376)      Worsening bilateral consolidation due to Covid-19  Workstation performed: [id number]         XR chest portable   Final Result by Cindy Cuevas DO (01/29 1716)      Stable chest with multi lobar pulmonary consolidation compatible with patient's known Covid pneumonia  Workstation performed: [id number]         XR chest portable   Final Result by Talita Thorne MD (01/26 1046)      Worsening multifocal airspace disease/pneumonia  In the setting of clinically suspected/proven COVID-19, this plain film appearance while nonspecific, can be seen in cases of viral pneumonia such as COVID-19  Workstation performed: [id number]         XR chest portable   Final Result by Alexandru Chaparro MD (01/18 1037)   ET tube and enteric tube remain in position  Probable mild pulmonary vascular congestion, improved                 Workstation performed: [id number]

## 2021-02-21 NOTE — PLAN OF CARE
Problem: Prexisting or High Potential for Compromised Skin Integrity  Goal: Skin integrity is maintained or improved  Description: INTERVENTIONS:  - Identify patients at risk for skin breakdown  - Assess and monitor skin integrity  - Assess and monitor nutrition and hydration status  - Monitor labs   - Assess for incontinence   - Turn and reposition patient  - Assist with mobility/ambulation  - Relieve pressure over bony prominences  - Avoid friction and shearing  - Provide appropriate hygiene as needed including keeping skin clean and dry  - Evaluate need for skin moisturizer/barrier cream  - Collaborate with interdisciplinary team   - Patient/family teaching  - Consider wound care consult   Outcome: Progressing     Problem: Potential for Falls  Goal: Patient will remain free of falls  Description: INTERVENTIONS:  - Assess patient frequently for physical needs  -  Identify cognitive and physical deficits and behaviors that affect risk of falls  -  [place] fall precautions as indicated by assessment   - Educate patient/family on patient safety including physical limitations  - Instruct patient to call for assistance with activity based on assessment  - Modify environment to reduce risk of injury  - Consider OT/PT consult to assist with strengthening/mobility  Outcome: Progressing     Problem: Nutrition/Hydration-ADULT  Goal: Nutrient/Hydration intake appropriate for improving, restoring or maintaining nutritional needs  Description: Monitor and assess patient's nutrition/hydration status for malnutrition  Collaborate with interdisciplinary team and initiate plan and interventions as ordered  Monitor patient's weight and dietary intake as ordered or per policy  Utilize nutrition screening tool and intervene as necessary  Determine patient's food preferences and provide high-protein, high-caloric foods as appropriate       INTERVENTIONS:  - Monitor oral intake, urinary output, labs, and treatment plans  - Assess nutrition and hydration status and recommend course of action  - Evaluate amount of meals eaten  - Assist patient with eating if necessary   - Allow adequate time for meals  - Recommend/ encourage appropriate diets, oral nutritional supplements, and vitamin/mineral supplements  - Order, calculate, and assess calorie counts as needed  - Recommend, monitor, and adjust tube feedings and TPN/PPN based on assessed needs  - Assess need for intravenous fluids  - Provide specific nutrition/hydration education as appropriate  - Include patient/family/caregiver in decisions related to nutrition  Outcome: Progressing     Problem: NEUROSENSORY - ADULT  Goal: Achieves stable or improved neurological status  Description: INTERVENTIONS  - Monitor and report changes in neurological status  - Monitor vital signs such as temperature, blood pressure, glucose, and any other labs ordered   - Initiate measures to prevent increased intracranial pressure  - Monitor for seizure activity and implement precautions if appropriate      Outcome: Progressing  Goal: Achieves maximal functionality and self care  Description: INTERVENTIONS  - Monitor swallowing and airway patency with patient fatigue and changes in neurological status  - Encourage and assist patient to increase activity and self care     - Encourage visually impaired, hearing impaired and aphasic patients to use assistive/communication devices  Outcome: Progressing     Problem: CARDIOVASCULAR - ADULT  Goal: Maintains optimal cardiac output and hemodynamic stability  Description: INTERVENTIONS:  - Monitor I/O, vital signs and rhythm  - Monitor for S/S and trends of decreased cardiac output  - Administer and titrate ordered vasoactive medications to optimize hemodynamic stability  - Assess quality of pulses, skin color and temperature  - Assess for signs of decreased coronary artery perfusion  - Instruct patient to report change in severity of symptoms  Outcome: Progressing  Goal: Absence of cardiac dysrhythmias or at baseline rhythm  Description: INTERVENTIONS:  - Continuous cardiac monitoring, vital signs, obtain 12 lead EKG if ordered  - Administer antiarrhythmic and heart rate control medications as ordered  - Monitor electrolytes and administer replacement therapy as ordered  Outcome: Progressing     Problem: RESPIRATORY - ADULT  Goal: Achieves optimal ventilation and oxygenation  Description: INTERVENTIONS:  - Assess for changes in respiratory status  - Assess for changes in mentation and behavior  - Position to facilitate oxygenation and minimize respiratory effort  - Oxygen administered by appropriate delivery if ordered  - Initiate smoking cessation education as indicated  - Encourage broncho-pulmonary hygiene including cough, deep breathe, Incentive Spirometry  - Assess the need for suctioning and aspirate as needed  - Assess and instruct to report SOB or any respiratory difficulty  - Respiratory Therapy support as indicated  Outcome: Progressing     Problem: GENITOURINARY - ADULT  Goal: Maintains or returns to baseline urinary function  Description: INTERVENTIONS:  - Assess urinary function  - Encourage oral fluids to ensure adequate hydration if ordered  - Administer IV fluids as ordered to ensure adequate hydration  - Administer ordered medications as needed  - Offer frequent toileting  - Follow urinary retention protocol if ordered  Outcome: Progressing  Goal: Absence of urinary retention  Description: INTERVENTIONS:  - Assess patients ability to void and empty bladder  - Monitor I/O  - Bladder scan as needed  - Discuss with physician/AP medications to alleviate retention as needed  - Discuss catheterization for long term situations as appropriate  Outcome: Progressing  Goal: Urinary catheter remains patent  Description: INTERVENTIONS:  - Assess patency of urinary catheter  - If patient has a chronic bo, consider changing catheter if non-functioning  - Follow guidelines for intermittent irrigation of non-functioning urinary catheter  Outcome: Progressing     Problem: METABOLIC, FLUID AND ELECTROLYTES - ADULT  Goal: Electrolytes maintained within normal limits  Description: INTERVENTIONS:  - Monitor labs and assess patient for signs and symptoms of electrolyte imbalances  - Administer electrolyte replacement as ordered  - Monitor response to electrolyte replacements, including repeat lab results as appropriate  - Instruct patient on fluid and nutrition as appropriate  Outcome: Progressing  Goal: Fluid balance maintained  Description: INTERVENTIONS:  - Monitor labs   - Monitor I/O and WT  - Instruct patient on fluid and nutrition as appropriate  - Assess for signs & symptoms of volume excess or deficit  Outcome: Progressing  Goal: Glucose maintained within target range  Description: INTERVENTIONS:  - Monitor Blood Glucose as ordered  - Assess for signs and symptoms of hyperglycemia and hypoglycemia  - Administer ordered medications to maintain glucose within target range  - Assess nutritional intake and initiate nutrition service referral as needed  Outcome: Progressing     Problem: MUSCULOSKELETAL - ADULT  Goal: Maintain or return mobility to safest level of function  Description: INTERVENTIONS:  - Assess patient's ability to carry out ADLs; assess patient's baseline for ADL function and identify physical deficits which impact ability to perform ADLs (bathing, care of mouth/teeth, toileting, grooming, dressing, etc )  - Assess/evaluate cause of self-care deficits   - Assess range of motion  - Assess patient's mobility  - Assess patient's need for assistive devices and provide as appropriate  - Encourage maximum independence but intervene and supervise when necessary  - Involve family in performance of ADLs  - Assess for home care needs following discharge   - Consider OT consult to assist with ADL evaluation and planning for discharge  - Provide patient education as appropriate  Outcome: Progressing  Goal: Maintain proper alignment of affected body part  Description: INTERVENTIONS:  - Support, maintain and protect limb and body alignment  - Provide patient/ family with appropriate education  Outcome: Progressing     Problem: INFECTION - ADULT  Goal: Absence or prevention of progression during hospitalization  Description: INTERVENTIONS:  - Assess and monitor for signs and symptoms of infection  - Monitor lab/diagnostic results  - Monitor all insertion sites, i e  indwelling lines, tubes, and drains  - Monitor endotracheal if appropriate and nasal secretions for changes in amount and color  - [place] appropriate cooling/warming therapies per order  - Administer medications as ordered  - Instruct and encourage patient and family to use good hand hygiene technique  - Identify and instruct in appropriate isolation precautions for identified infection/condition  Outcome: Progressing     Problem: DISCHARGE PLANNING  Goal: Discharge to home or other facility with appropriate resources  Description: INTERVENTIONS:  - Identify barriers to discharge w/patient and caregiver  - Arrange for needed discharge resources and transportation as appropriate  - Identify discharge learning needs (meds, wound care, etc )  - Arrange for interpretive services to assist at discharge as needed  - Refer to Case Management Department for coordinating discharge planning if the patient needs post-hospital services based on physician/advanced practitioner order or complex needs related to functional status, cognitive ability, or social support system  Outcome: Progressing     Problem: Knowledge Deficit  Goal: Patient/family/caregiver demonstrates understanding of disease process, treatment plan, medications, and discharge instructions  Description: Complete learning assessment and assess knowledge base    Interventions:  - Provide teaching at level of understanding  - Provide teaching via preferred learning methods  Outcome: Progressing     Problem: SAFETY,RESTRAINT: NV/NON-SELF DESTRUCTIVE BEHAVIOR  Goal: Remains free of harm/injury (restraint for non violent/non self-detsructive behavior)  Description: INTERVENTIONS:  - Instruct patient/family regarding restraint use   - Assess and monitor physiologic and psychological status   - Provide interventions and comfort measures to meet assessed patient needs   - Identify and implement measures to help patient regain control  - Assess readiness for release of restraint   Outcome: Progressing  Goal: Returns to optimal restraint-free functioning  Description: INTERVENTIONS:  - Assess the patient's behavior and symptoms that indicate continued need for restraint  - Identify and implement measures to help patient regain control  - Assess readiness for release of restraint   Outcome: Progressing

## 2021-02-22 PROBLEM — R19.7 DIARRHEA OF PRESUMED INFECTIOUS ORIGIN: Status: RESOLVED | Noted: 2021-02-17 | Resolved: 2021-02-22

## 2021-02-22 PROBLEM — J96.01 ACUTE RESPIRATORY FAILURE WITH HYPOXIA AND HYPERCARBIA (HCC): Status: RESOLVED | Noted: 2021-01-18 | Resolved: 2021-02-22

## 2021-02-22 PROBLEM — G92.8 TOXIC METABOLIC ENCEPHALOPATHY: Status: RESOLVED | Noted: 2019-02-16 | Resolved: 2021-02-22

## 2021-02-22 PROBLEM — J96.02 ACUTE RESPIRATORY FAILURE WITH HYPOXIA AND HYPERCARBIA (HCC): Status: RESOLVED | Noted: 2021-01-18 | Resolved: 2021-02-22

## 2021-02-22 PROBLEM — J15.5 PNEUMONIA DUE TO ESCHERICHIA COLI (HCC): Status: RESOLVED | Noted: 2021-02-09 | Resolved: 2021-02-22

## 2021-02-22 LAB
GLUCOSE SERPL-MCNC: 118 MG/DL (ref 65–140)
GLUCOSE SERPL-MCNC: 85 MG/DL (ref 65–140)
GLUCOSE SERPL-MCNC: 91 MG/DL (ref 65–140)
GLUCOSE SERPL-MCNC: 97 MG/DL (ref 65–140)

## 2021-02-22 PROCEDURE — 94760 N-INVAS EAR/PLS OXIMETRY 1: CPT

## 2021-02-22 PROCEDURE — 94762 N-INVAS EAR/PLS OXIMTRY CONT: CPT

## 2021-02-22 PROCEDURE — 99232 SBSQ HOSP IP/OBS MODERATE 35: CPT | Performed by: FAMILY MEDICINE

## 2021-02-22 PROCEDURE — 92526 ORAL FUNCTION THERAPY: CPT

## 2021-02-22 PROCEDURE — 82948 REAGENT STRIP/BLOOD GLUCOSE: CPT

## 2021-02-22 PROCEDURE — 99232 SBSQ HOSP IP/OBS MODERATE 35: CPT | Performed by: INTERNAL MEDICINE

## 2021-02-22 RX ADMIN — Medication 1 TABLET: at 08:10

## 2021-02-22 RX ADMIN — POTASSIUM CHLORIDE 40 MEQ: 1500 TABLET, EXTENDED RELEASE ORAL at 08:10

## 2021-02-22 RX ADMIN — Medication 2000 UNITS: at 08:10

## 2021-02-22 RX ADMIN — MIDODRINE HYDROCHLORIDE 10 MG: 5 TABLET ORAL at 14:57

## 2021-02-22 RX ADMIN — MIDODRINE HYDROCHLORIDE 10 MG: 5 TABLET ORAL at 06:02

## 2021-02-22 RX ADMIN — APIXABAN 2.5 MG: 2.5 TABLET, FILM COATED ORAL at 17:05

## 2021-02-22 RX ADMIN — ASPIRIN 81 MG CHEWABLE TABLET 81 MG: 81 TABLET CHEWABLE at 08:10

## 2021-02-22 RX ADMIN — LEVETIRACETAM 750 MG: 500 TABLET, FILM COATED ORAL at 08:10

## 2021-02-22 RX ADMIN — SERTRALINE 125 MG: 25 TABLET, FILM COATED ORAL at 08:10

## 2021-02-22 RX ADMIN — APIXABAN 2.5 MG: 2.5 TABLET, FILM COATED ORAL at 08:10

## 2021-02-22 RX ADMIN — POTASSIUM CHLORIDE 40 MEQ: 1500 TABLET, EXTENDED RELEASE ORAL at 11:22

## 2021-02-22 RX ADMIN — BUMETANIDE 2 MG: 1 TABLET ORAL at 08:10

## 2021-02-22 RX ADMIN — POTASSIUM CHLORIDE 40 MEQ: 1500 TABLET, EXTENDED RELEASE ORAL at 15:27

## 2021-02-22 RX ADMIN — ACETAMINOPHEN 650 MG: 325 TABLET, FILM COATED ORAL at 06:02

## 2021-02-22 RX ADMIN — Medication 20 MG: at 02:39

## 2021-02-22 RX ADMIN — ACETAMINOPHEN 650 MG: 325 TABLET, FILM COATED ORAL at 15:27

## 2021-02-22 RX ADMIN — Medication 20 MG: at 14:57

## 2021-02-22 NOTE — PLAN OF CARE
Problem: Prexisting or High Potential for Compromised Skin Integrity  Goal: Skin integrity is maintained or improved  Description: INTERVENTIONS:  - Identify patients at risk for skin breakdown  - Assess and monitor skin integrity  - Assess and monitor nutrition and hydration status  - Monitor labs   - Assess for incontinence   - Turn and reposition patient  - Assist with mobility/ambulation  - Relieve pressure over bony prominences  - Avoid friction and shearing  - Provide appropriate hygiene as needed including keeping skin clean and dry  - Evaluate need for skin moisturizer/barrier cream  - Collaborate with interdisciplinary team   - Patient/family teaching  - Consider wound care consult   Outcome: Progressing     Problem: Potential for Falls  Goal: Patient will remain free of falls  Description: INTERVENTIONS:  - Assess patient frequently for physical needs  -  Identify cognitive and physical deficits and behaviors that affect risk of falls    -  [place] fall precautions as indicated by assessment   - Educate patient/family on patient safety including physical limitations  - Instruct patient to call for assistance with activity based on assessment  - Modify environment to reduce risk of injury  - Consider OT/PT consult to assist with strengthening/mobility  Outcome: Progressing     Problem: SAFETY,RESTRAINT: NV/NON-SELF DESTRUCTIVE BEHAVIOR  Goal: Remains free of harm/injury (restraint for non violent/non self-detsructive behavior)  Description: INTERVENTIONS:  - Instruct patient/family regarding restraint use   - Assess and monitor physiologic and psychological status   - Provide interventions and comfort measures to meet assessed patient needs   - Identify and implement measures to help patient regain control  - Assess readiness for release of restraint   Outcome: Progressing  Goal: Returns to optimal restraint-free functioning  Description: INTERVENTIONS:  - Assess the patient's behavior and symptoms that indicate continued need for restraint  - Identify and implement measures to help patient regain control  - Assess readiness for release of restraint   Outcome: Progressing     Problem: Nutrition/Hydration-ADULT  Goal: Nutrient/Hydration intake appropriate for improving, restoring or maintaining nutritional needs  Description: Monitor and assess patient's nutrition/hydration status for malnutrition  Collaborate with interdisciplinary team and initiate plan and interventions as ordered  Monitor patient's weight and dietary intake as ordered or per policy  Utilize nutrition screening tool and intervene as necessary  Determine patient's food preferences and provide high-protein, high-caloric foods as appropriate       INTERVENTIONS:  - Monitor oral intake, urinary output, labs, and treatment plans  - Assess nutrition and hydration status and recommend course of action  - Evaluate amount of meals eaten  - Assist patient with eating if necessary   - Allow adequate time for meals  - Recommend/ encourage appropriate diets, oral nutritional supplements, and vitamin/mineral supplements  - Order, calculate, and assess calorie counts as needed  - Recommend, monitor, and adjust tube feedings and TPN/PPN based on assessed needs  - Assess need for intravenous fluids  - Provide specific nutrition/hydration education as appropriate  - Include patient/family/caregiver in decisions related to nutrition  Outcome: Progressing     Problem: NEUROSENSORY - ADULT  Goal: Achieves stable or improved neurological status  Description: INTERVENTIONS  - Monitor and report changes in neurological status  - Monitor vital signs such as temperature, blood pressure, glucose, and any other labs ordered   - Initiate measures to prevent increased intracranial pressure  - Monitor for seizure activity and implement precautions if appropriate      Outcome: Progressing  Goal: Achieves maximal functionality and self care  Description: INTERVENTIONS  - Monitor swallowing and airway patency with patient fatigue and changes in neurological status  - Encourage and assist patient to increase activity and self care     - Encourage visually impaired, hearing impaired and aphasic patients to use assistive/communication devices  Outcome: Progressing     Problem: CARDIOVASCULAR - ADULT  Goal: Maintains optimal cardiac output and hemodynamic stability  Description: INTERVENTIONS:  - Monitor I/O, vital signs and rhythm  - Monitor for S/S and trends of decreased cardiac output  - Administer and titrate ordered vasoactive medications to optimize hemodynamic stability  - Assess quality of pulses, skin color and temperature  - Assess for signs of decreased coronary artery perfusion  - Instruct patient to report change in severity of symptoms  Outcome: Progressing  Goal: Absence of cardiac dysrhythmias or at baseline rhythm  Description: INTERVENTIONS:  - Continuous cardiac monitoring, vital signs, obtain 12 lead EKG if ordered  - Administer antiarrhythmic and heart rate control medications as ordered  - Monitor electrolytes and administer replacement therapy as ordered  Outcome: Progressing     Problem: RESPIRATORY - ADULT  Goal: Achieves optimal ventilation and oxygenation  Description: INTERVENTIONS:  - Assess for changes in respiratory status  - Assess for changes in mentation and behavior  - Position to facilitate oxygenation and minimize respiratory effort  - Oxygen administered by appropriate delivery if ordered  - Initiate smoking cessation education as indicated  - Encourage broncho-pulmonary hygiene including cough, deep breathe, Incentive Spirometry  - Assess the need for suctioning and aspirate as needed  - Assess and instruct to report SOB or any respiratory difficulty  - Respiratory Therapy support as indicated  Outcome: Progressing     Problem: GENITOURINARY - ADULT  Goal: Maintains or returns to baseline urinary function  Description: INTERVENTIONS:  - Assess urinary function  - Encourage oral fluids to ensure adequate hydration if ordered  - Administer IV fluids as ordered to ensure adequate hydration  - Administer ordered medications as needed  - Offer frequent toileting  - Follow urinary retention protocol if ordered  Outcome: Progressing  Goal: Absence of urinary retention  Description: INTERVENTIONS:  - Assess patients ability to void and empty bladder  - Monitor I/O  - Bladder scan as needed  - Discuss with physician/AP medications to alleviate retention as needed  - Discuss catheterization for long term situations as appropriate  Outcome: Progressing  Goal: Urinary catheter remains patent  Description: INTERVENTIONS:  - Assess patency of urinary catheter  - If patient has a chronic bo, consider changing catheter if non-functioning  - Follow guidelines for intermittent irrigation of non-functioning urinary catheter  Outcome: Progressing     Problem: METABOLIC, FLUID AND ELECTROLYTES - ADULT  Goal: Electrolytes maintained within normal limits  Description: INTERVENTIONS:  - Monitor labs and assess patient for signs and symptoms of electrolyte imbalances  - Administer electrolyte replacement as ordered  - Monitor response to electrolyte replacements, including repeat lab results as appropriate  - Instruct patient on fluid and nutrition as appropriate  Outcome: Progressing  Goal: Fluid balance maintained  Description: INTERVENTIONS:  - Monitor labs   - Monitor I/O and WT  - Instruct patient on fluid and nutrition as appropriate  - Assess for signs & symptoms of volume excess or deficit  Outcome: Progressing  Goal: Glucose maintained within target range  Description: INTERVENTIONS:  - Monitor Blood Glucose as ordered  - Assess for signs and symptoms of hyperglycemia and hypoglycemia  - Administer ordered medications to maintain glucose within target range  - Assess nutritional intake and initiate nutrition service referral as needed  Outcome: Progressing     Problem: MUSCULOSKELETAL - ADULT  Goal: Maintain or return mobility to safest level of function  Description: INTERVENTIONS:  - Assess patient's ability to carry out ADLs; assess patient's baseline for ADL function and identify physical deficits which impact ability to perform ADLs (bathing, care of mouth/teeth, toileting, grooming, dressing, etc )  - Assess/evaluate cause of self-care deficits   - Assess range of motion  - Assess patient's mobility  - Assess patient's need for assistive devices and provide as appropriate  - Encourage maximum independence but intervene and supervise when necessary  - Involve family in performance of ADLs  - Assess for home care needs following discharge   - Consider OT consult to assist with ADL evaluation and planning for discharge  - Provide patient education as appropriate  Outcome: Progressing  Goal: Maintain proper alignment of affected body part  Description: INTERVENTIONS:  - Support, maintain and protect limb and body alignment  - Provide patient/ family with appropriate education  Outcome: Progressing     Problem: INFECTION - ADULT  Goal: Absence or prevention of progression during hospitalization  Description: INTERVENTIONS:  - Assess and monitor for signs and symptoms of infection  - Monitor lab/diagnostic results  - Monitor all insertion sites, i e  indwelling lines, tubes, and drains  - Monitor endotracheal if appropriate and nasal secretions for changes in amount and color  - [place] appropriate cooling/warming therapies per order  - Administer medications as ordered  - Instruct and encourage patient and family to use good hand hygiene technique  - Identify and instruct in appropriate isolation precautions for identified infection/condition  Outcome: Progressing     Problem: DISCHARGE PLANNING  Goal: Discharge to home or other facility with appropriate resources  Description: INTERVENTIONS:  - Identify barriers to discharge w/patient and caregiver  - Arrange for needed discharge resources and transportation as appropriate  - Identify discharge learning needs (meds, wound care, etc )  - Arrange for interpretive services to assist at discharge as needed  - Refer to Case Management Department for coordinating discharge planning if the patient needs post-hospital services based on physician/advanced practitioner order or complex needs related to functional status, cognitive ability, or social support system  Outcome: Progressing     Problem: Knowledge Deficit  Goal: Patient/family/caregiver demonstrates understanding of disease process, treatment plan, medications, and discharge instructions  Description: Complete learning assessment and assess knowledge base    Interventions:  - Provide teaching at level of understanding  - Provide teaching via preferred learning methods  Outcome: Progressing

## 2021-02-22 NOTE — UTILIZATION REVIEW
Continued Stay Review    Date: 2/21/2021                          Current Patient Class: ip     Current Level of Care: med surg     HPI:60 y o  male initially admitted on 01/17/2021  Acute hypoxia respiratory failure due to prior COVID 19 pneumonia  Assessment/Plan:   2/21 Provider: Patient states he is "breathing fine"- less dyspneic  on 4 L NC & nocturnal BiPAP  Diminished breath sounds bilateral secondary to poor effort ; PATRICK  POA, likely multifactorial 2/2 ATN in setting of COVID-19 + contrast induced nephropathy + vanco toxicity  Baseline creatinine 1 0-1 3- bo in place, on Bumex BID  Monitor renal function  On Keppra per Neuro EEG 2/2 Background activities are too slow suggesting moderate diffuse cerebral dysfunction of nonspecific etiology  Seizure precautions  Monitor BPs- hypotensive 2/19 oernight- received albumin x1 w improvement; refused PO Midodrine       Pertinent Labs/Diagnostic Results:       Results from last 7 days   Lab Units 02/21/21  0915 02/20/21  0437 02/19/21  0548 02/18/21  0519 02/17/21  0442   WBC Thousand/uL 9 20 7 08 7 59 7 32 8 02   HEMOGLOBIN g/dL 8 4* 7 9* 8 7* 8 9* 8 5*   HEMATOCRIT % 31 0* 28 6* 31 0* 31 7* 31 4*   PLATELETS Thousands/uL 205 162 168 139* 162   NEUTROS ABS Thousands/µL 6 63 4 89 5 43  --   --    BANDS PCT %  --   --   --  2 5         Results from last 7 days   Lab Units 02/21/21  0912 02/20/21  0437 02/19/21  0548 02/18/21  2100 02/18/21  0519  02/17/21  0442  02/16/21  0317   SODIUM mmol/L 146* 148* 148* 148* 149*   < > 146*   < > 149*   POTASSIUM mmol/L 3 4* 3 1* 3 0* 3 3* 2 6*   < > 2 8*   < > 3 1*   CHLORIDE mmol/L 112* 112* 111* 110* 113*   < > 109*   < > 111*   CO2 mmol/L 25 27 26 28 25   < > 30   < > 32   ANION GAP mmol/L 9 9 11 10 11   < > 7   < > 6   BUN mg/dL 33* 35* 41* 42* 39*   < > 46*   < > 53*   CREATININE mg/dL 1 23 1 25 1 30 1 34* 1 07   < > 1 34*   < > 1 39*   EGFR ml/min/1 73sq m 63 62 59 57 75   < > 57   < > 54   CALCIUM mg/dL 8 7 8 5 9 0 8 9 8 1*   < > 8 6   < > 8 9   CALCIUM, IONIZED mmol/L  --   --   --   --   --   --   --   --  1 17   MAGNESIUM mg/dL  --  2 1  --   --  2 2  --  2 4  --  2 1   PHOSPHORUS mg/dL  --   --   --   --  3 9  --  4 4*  --  3 4    < > = values in this interval not displayed  Results from last 7 days   Lab Units 21  0912 21  0437   AST U/L 22 19   ALT U/L 31 28   ALK PHOS U/L 71 68   TOTAL PROTEIN g/dL 6 8 6 5   ALBUMIN g/dL 2 7* 2 7*   TOTAL BILIRUBIN mg/dL 0 52 0 54     Results from last 7 days   Lab Units 21  2251 21  1606 21  1113 21  0816 21  2147 21  1519 21  1035 21  0810 21  2231 21  1553   POC GLUCOSE mg/dl 110 107 98 102 110 87 105 122 99 158*     Results from last 7 days   Lab Units 21  0912 21  0437 21  0548 21  2100 21  0519 21  2034 21  1319 21  0442 21  1956 21  1148 21  0317 02/15/21  1949   GLUCOSE RANDOM mg/dL 100 97 100 108 85 91 101 113 91 105 104 126     Results from last 7 days   Lab Units 21  1317   TROPONIN I ng/mL <0 02         Results from last 7 days   Lab Units 21  0442 21  0317   PTT seconds 122* 90*                         Results from last 7 days   Lab Units 21  0437   NT-PRO BNP pg/mL 2,167*         Results from last 7 days   Lab Units 21  1007   C DIFF TOXIN B BY PCR  Negative                 Results from last 7 days   Lab Units 21  0519 21  0442   TOTAL COUNTED  100 100           Vital Signs: Temp (24hrs), Av 3 °F (36 8 °C), Min:97 5 °F (36 4 °C), Max:99 1 °F (37 3 °C)     Temp:  [97 5 °F (36 4 °C)-99 1 °F (37 3 °C)] 97 5 °F (36 4 °C)  HR:  [106-108] 106  Resp:  [22-24] 24  BP: (101-116)/(57-59) 107/59  SpO2:  [94 %-98 %] 95 %  Body mass index is 50 35 kg/m²       Medications:   Scheduled Medications:  apixaban, 2 5 mg, Oral, BID  aspirin, 81 mg, Oral, Daily  atorvastatin, 40 mg, Per NG Tube, HS  bumetanide, 2 mg, Oral, Daily  cholecalciferol, 2,000 Units, Per NG Tube, Daily  insulin lispro, 1-5 Units, Subcutaneous, HS  insulin lispro, 2-12 Units, Subcutaneous, TID AC  levETIRAcetam, 750 mg, Oral, Q12H [facility]  melatonin, 6 mg, Oral, HS  metoprolol tartrate, 25 mg, Oral, Q12H ARACELIS  midodrine, 10 mg, Oral, Q8H  multivitamin-minerals, 1 tablet, Oral, Daily  omeprazole (PRILOSEC) suspension 2 mg/mL, 20 mg, Oral, Q12H  potassium chloride, 40 mEq, Oral, TID With Meals  sertraline, 125 mg, Oral, Daily    Continuous IV Infusions:     PRN Meds:  acetaminophen, 650 mg, Oral, Q6H PRN    Discharge Plan: tbd- back to Rehab facility when medically stable  Network Utilization Review Department  ATTENTION: Please call with any questions or concerns to [phone] and carefully listen to the prompts so that you are directed to the right person  All voicemails are confidential   Duane Butler all requests for admission clinical reviews, approved or denied determinations and any other requests to dedicated fax number below belonging to the campus where the patient is receiving treatment   List of dedicated fax numbers for the Facilities:  [address] DENIALS (Administrative/Medical Necessity) [phone]   [address] (Maternity/NICU/Pediatrics) [phone]   [address] Wally Sykes 8048 (  Jero Jim "Mila" 103) [id number] [place] [address] Radha Gallagher 1481 [address] [place] [address] [phone]

## 2021-02-22 NOTE — DISCHARGE INSTR - AVS FIRST PAGE
Dear Kanu Johnson,     It was our pleasure to care for you here at [facility]  It is our hope that we were always able to exceed the expected standards for your care during your stay  You were hospitalized due to Acute respiratory failure secondary to COVID superimposed with E coli pneumonia  You were cared for on the 3rd floor by Raymond Garg MD under the service of Julia Light MD with the [facility] who covers for your primary care physician (PCP), Noah Chaudhary DO, while you were hospitalized  If you have any questions or concerns related to this hospitalization, you may contact us at [phone]  For follow up as well as any medication refills, we recommend that you follow up with your primary care physician  A registered nurse will reach out to you by phone within a few days after your discharge to answer any additional questions that you may have after going home  However, at this time we provide for you here, the most important instructions / recommendations at discharge:     · Notable Medication Adjustments -   · None  · Testing Required after Discharge -   · None  · Important follow up information -   · Follow up with PCP x  1week  · Other Instructions -   · None  · Please review this entire after visit summary as additional general instructions including medication list, appointments, activity, diet, any pertinent wound care, and other additional recommendations from your care team that may be provided for you        Sincerely,     Raymond Garg MD

## 2021-02-22 NOTE — PROGRESS NOTES
Progress Note - Adrien Pacheco 1960, 63 y o  male MRN: [id number]    Unit/Bed#: S -01 Encounter: [id number]    Primary Care Provider: Belen Leyva DO   Date and time admitted to hospital: 1/17/2021  4:16 PM        Acute kidney injury superimposed on CKD [facility])  Assessment & Plan  POA, likely multifactorial 2/2 ATN in setting of COVID-19 + contrast induced nephropathy + vanco toxicity  Baseline creatinine 1 0-1 3    Plan    · Turner catheter in place  · Monitor I/Os  · Currently on Bumex 2 mg twice daily  · Monitor renal indices    History of stroke  Assessment & Plan  History of stroke 1 year ago  Now baseline resident at [facility]  Plan  · Continue Eliquis daily  · Continue ASA daily    ? Eliquis daily  ? Asa daily       Seizure [facility])  Assessment & Plan  Patient does not have a seizure history but does have history of CVA in the past  Neurology consulted and appreciate input, have signed off with recs for plan as below    Plan  · Continue on Keppra 750mg BID  · EEG 2/2/21 Background activities are too slow suggesting moderate diffuse cerebral dysfunction of nonspecific etiology  · Seizure precautions    Hypernatremia  Assessment & Plan  Patient previously being followed by Nephrology, was on D5W which was d/c as of 2/17      Plan  · Na remains elevated, 146 this am  · Will continue to monitor with BMP      Anemia  Assessment & Plan  · No active bleeding  · Trend CBC daily   · hgb stable    · Transfuse for Hgb < 7 0     Atrial fibrillation (HCC)  Assessment & Plan  · Continue eliquis 2 5mg BID  · Holding cardizem secondary to hypotension  · Continue metoprolol 25mg BID    Depression  Assessment & Plan  · Continue sertraline 125 mg   · Per psych eval-patient is not suicidal, does not require 1:1  · Required to return to nursing facility    Morbid obesity   Assessment & Plan  · Continue work with PT/OT  · Recommend therapeutic lifestyle changes to promote weight loss    Dysphagia  Assessment & Plan  · Tolerating puree and nectar thick liquids by straw w/o overt dysphagia  · Dysphagia-2 diet with NT per speech pathology    History of aortic valve replacement with bioprosthetic valve  Assessment & Plan  · Echo 2021 - EF 55%, bioprosthetic AV with gradient of 16  · Murmur present on exam today  · Hold home cardizem    Hypokalemia  Assessment & Plan  · Hypokalemia this am at 3 1  · Restarted Kdur 40mg BID  · Continue to replace PRN  · Monitor with BMP    Hyperlipidemia  Assessment & Plan  · Continue statin    Essential hypertension  Assessment & Plan  · Hold home cardizem in setting of hypotension  · Continue metoprolol 25mg BID for rate control  · BP's stable on Midodrine TID  ·  overnight: 80/50, refused PO midodrine  Received albumin x1 with improvement  VTE Pharmacologic Prophylaxis:   Pharmacologic: Apixaban (Eliquis)  Mechanical VTE Prophylaxis in Place: Yes    Discussions with Specialists or Other Care Team Provider: Psych, Speech pathology    Education and Discussions with Family / Patient: Will update family    Current Length of Stay: 39 day(s)    Current Patient Status: Inpatient     Discharge Plan / Estimated Discharge Date: TBD    Code Status: Level 1 - Full Code      Subjective:   Patient states he is doing okay this morning reports breathing is fine  Objective:     Vitals:   Temp (24hrs), Av 3 °F (36 8 °C), Min:97 5 °F (36 4 °C), Max:99 1 °F (37 3 °C)    Temp:  [97 5 °F (36 4 °C)-99 1 °F (37 3 °C)] 97 5 °F (36 4 °C)  HR:  [106-108] 106  Resp:  [22-24] 24  BP: (101-116)/(57-59) 107/59  SpO2:  [94 %-98 %] 95 %  Body mass index is 50 35 kg/m²  Input and Output Summary (last 24 hours): Intake/Output Summary (Last 24 hours) at 2021 1130  Last data filed at 2021 1001  Gross per 24 hour   Intake 480 ml   Output 2050 ml   Net -1570 ml       Physical Exam:     Physical Exam  Constitutional:       Appearance: He is obese     HENT: Head: Normocephalic and atraumatic  Right Ear: External ear normal       Left Ear: External ear normal       Nose: Nose normal    Eyes:      Conjunctiva/sclera: Conjunctivae normal    Cardiovascular:      Heart sounds: Normal heart sounds  Pulmonary:      Breath sounds: Rhonchi and rales present  Comments: On 5L NC  Diminished lungs sound  Abdominal:      General: Bowel sounds are normal    Genitourinary:     Comments: Turner catheter in place  Musculoskeletal:      Right lower leg: Edema present  Left lower leg: Edema present  Skin:     General: Skin is warm and dry  Neurological:      Mental Status: Mental status is at baseline  Psychiatric:         Mood and Affect: Mood normal          Additional Data:     Labs:    Results from last 7 days   Lab Units 02/21/21  0915   WBC Thousand/uL 9 20   HEMOGLOBIN g/dL 8 4*   HEMATOCRIT % 31 0*   PLATELETS Thousands/uL 205   NEUTROS PCT % 72   LYMPHS PCT % 7*   MONOS PCT % 8   EOS PCT % 10*     Results from last 7 days   Lab Units 02/21/21  0912   POTASSIUM mmol/L 3 4*   CHLORIDE mmol/L 112*   CO2 mmol/L 25   BUN mg/dL 33*   CREATININE mg/dL 1 23   CALCIUM mg/dL 8 7   ALK PHOS U/L 71   ALT U/L 31   AST U/L 22           * I Have Reviewed All Lab Data Listed Above  * Additional Pertinent Lab Tests Reviewed:  All Labs Within Last 24 Hours Reviewed    Imaging:    Imaging Reports Reviewed Today Include: None  Imaging Personally Reviewed by Myself Includes:  As above    Recent Cultures (last 7 days):     Results from last 7 days   Lab Units 02/17/21  1007   C DIFF TOXIN B BY PCR  Negative       Last 24 Hours Medication List:   Current Facility-Administered Medications   Medication Dose Route Frequency Provider Last Rate    acetaminophen  650 mg Oral Q6H PRN Emma Hurst DO      apixaban  2 5 mg Oral BID Emma Hurst DO      aspirin  81 mg Oral Daily Emma Hurst DO      atorvastatin  40 mg Per NG Tube HS Emma Hurst DO      bumetanide  2 mg Oral Daily Clifton Bautista MD      cholecalciferol  2,000 Units Per NG Tube Daily Sherita Pare, DO      insulin lispro  1-5 Units Subcutaneous HS KATHY Jarvis      insulin lispro  2-12 Units Subcutaneous TID AC KATHY Leonard      levETIRAcetam  750 mg Oral Q12H [address], DO      melatonin  6 mg Oral HS Sherita Pare, DO      metoprolol tartrate  25 mg Oral Q12H [address], DO      midodrine  10 mg Oral Q8H Sherita Pare, DO      multivitamin-minerals  1 tablet Oral Daily Sherita Pare, DO      omeprazole (PRILOSEC) suspension 2 mg/mL  20 mg Oral Q12H Sherita Pare, DO      potassium chloride  40 mEq Oral TID With Meals Clifton Bautista MD      sertraline  125 mg Oral Daily Bridger Bliss MD          Today, Patient Was Seen By: Holly Aguero MD    ** Please Note: This note has been constructed using a voice recognition system   **

## 2021-02-22 NOTE — ASSESSMENT & PLAN NOTE
· No prior history of seizure disorder  · Had a witnessed tonic-clonic seizure episode on 2/1/21 and was seen by Neurology  · EEG 2/2/21 Background activities are too slow suggesting moderate diffuse cerebral dysfunction of nonspecific etiology  · Continue seizure precautions, Keppra 750 mg b i d

## 2021-02-22 NOTE — SPEECH THERAPY NOTE
Speech Language/Pathology    Speech/Language Pathology Progress Note    Patient Name: Nuha Lewis  [facility] Date: 2/22/2021    Subjective:  Pt was seen for dysphagia tx follow up at lunch  Pt was resting in bed, easily aroused to name  Pt stated he was not very hungry, but that he would try some of his lunch  Pt currently on dysphagia 2 and NTL  Objective:  Pt agreeable to: 3 bites dysphagia 2 steak, 1 bite carrots, and NTL liquids  Pt w/ adequate bolus retrieval of all consistencies  Prolonged mastication and manipulation of solids  Good oral control of liquids by straw  Transfer of all consistencies was timely  Swallows were timely and complete  No overt s/s aspiration noted  Pt w/ poor po during session, but stated he would try to eat again later  Assessment:  Pt appears to be tolerating dysphagia 2 diet and NTL, while po was poor during session  No overt s/s aspiration noted throughout session  Plan/Recommendations:  Continue dysphagia 2 and nectar thick liquids  Meds in pudding  Aspiration precautions  No follow up tx needed at this time

## 2021-02-22 NOTE — PLAN OF CARE
Problem: Prexisting or High Potential for Compromised Skin Integrity  Goal: Skin integrity is maintained or improved  Description: INTERVENTIONS:  - Identify patients at risk for skin breakdown  - Assess and monitor skin integrity  - Assess and monitor nutrition and hydration status  - Monitor labs   - Assess for incontinence   - Turn and reposition patient  - Assist with mobility/ambulation  - Relieve pressure over bony prominences  - Avoid friction and shearing  - Provide appropriate hygiene as needed including keeping skin clean and dry  - Evaluate need for skin moisturizer/barrier cream  - Collaborate with interdisciplinary team   - Patient/family teaching  - Consider wound care consult   Outcome: Progressing     Problem: Potential for Falls  Goal: Patient will remain free of falls  Description: INTERVENTIONS:  - Assess patient frequently for physical needs  -  Identify cognitive and physical deficits and behaviors that affect risk of falls  -  [place] fall precautions as indicated by assessment   - Educate patient/family on patient safety including physical limitations  - Instruct patient to call for assistance with activity based on assessment  - Modify environment to reduce risk of injury  - Consider OT/PT consult to assist with strengthening/mobility  Outcome: Progressing     Problem: Nutrition/Hydration-ADULT  Goal: Nutrient/Hydration intake appropriate for improving, restoring or maintaining nutritional needs  Description: Monitor and assess patient's nutrition/hydration status for malnutrition  Collaborate with interdisciplinary team and initiate plan and interventions as ordered  Monitor patient's weight and dietary intake as ordered or per policy  Utilize nutrition screening tool and intervene as necessary  Determine patient's food preferences and provide high-protein, high-caloric foods as appropriate       INTERVENTIONS:  - Monitor oral intake, urinary output, labs, and treatment plans  - Assess nutrition and hydration status and recommend course of action  - Evaluate amount of meals eaten  - Assist patient with eating if necessary   - Allow adequate time for meals  - Recommend/ encourage appropriate diets, oral nutritional supplements, and vitamin/mineral supplements  - Order, calculate, and assess calorie counts as needed  - Recommend, monitor, and adjust tube feedings and TPN/PPN based on assessed needs  - Assess need for intravenous fluids  - Provide specific nutrition/hydration education as appropriate  - Include patient/family/caregiver in decisions related to nutrition  Outcome: Progressing     Problem: NEUROSENSORY - ADULT  Goal: Achieves stable or improved neurological status  Description: INTERVENTIONS  - Monitor and report changes in neurological status  - Monitor vital signs such as temperature, blood pressure, glucose, and any other labs ordered   - Initiate measures to prevent increased intracranial pressure  - Monitor for seizure activity and implement precautions if appropriate      Outcome: Progressing  Goal: Achieves maximal functionality and self care  Description: INTERVENTIONS  - Monitor swallowing and airway patency with patient fatigue and changes in neurological status  - Encourage and assist patient to increase activity and self care     - Encourage visually impaired, hearing impaired and aphasic patients to use assistive/communication devices  Outcome: Progressing     Problem: CARDIOVASCULAR - ADULT  Goal: Maintains optimal cardiac output and hemodynamic stability  Description: INTERVENTIONS:  - Monitor I/O, vital signs and rhythm  - Monitor for S/S and trends of decreased cardiac output  - Administer and titrate ordered vasoactive medications to optimize hemodynamic stability  - Assess quality of pulses, skin color and temperature  - Assess for signs of decreased coronary artery perfusion  - Instruct patient to report change in severity of symptoms  Outcome: Progressing  Goal: Absence of cardiac dysrhythmias or at baseline rhythm  Description: INTERVENTIONS:  - Continuous cardiac monitoring, vital signs, obtain 12 lead EKG if ordered  - Administer antiarrhythmic and heart rate control medications as ordered  - Monitor electrolytes and administer replacement therapy as ordered  Outcome: Progressing     Problem: RESPIRATORY - ADULT  Goal: Achieves optimal ventilation and oxygenation  Description: INTERVENTIONS:  - Assess for changes in respiratory status  - Assess for changes in mentation and behavior  - Position to facilitate oxygenation and minimize respiratory effort  - Oxygen administered by appropriate delivery if ordered  - Initiate smoking cessation education as indicated  - Encourage broncho-pulmonary hygiene including cough, deep breathe, Incentive Spirometry  - Assess the need for suctioning and aspirate as needed  - Assess and instruct to report SOB or any respiratory difficulty  - Respiratory Therapy support as indicated  Outcome: Progressing     Problem: GENITOURINARY - ADULT  Goal: Maintains or returns to baseline urinary function  Description: INTERVENTIONS:  - Assess urinary function  - Encourage oral fluids to ensure adequate hydration if ordered  - Administer IV fluids as ordered to ensure adequate hydration  - Administer ordered medications as needed  - Offer frequent toileting  - Follow urinary retention protocol if ordered  Outcome: Progressing  Goal: Absence of urinary retention  Description: INTERVENTIONS:  - Assess patients ability to void and empty bladder  - Monitor I/O  - Bladder scan as needed  - Discuss with physician/AP medications to alleviate retention as needed  - Discuss catheterization for long term situations as appropriate  Outcome: Progressing  Goal: Urinary catheter remains patent  Description: INTERVENTIONS:  - Assess patency of urinary catheter  - If patient has a chronic bo, consider changing catheter if non-functioning  - Follow guidelines for intermittent irrigation of non-functioning urinary catheter  Outcome: Progressing     Problem: METABOLIC, FLUID AND ELECTROLYTES - ADULT  Goal: Electrolytes maintained within normal limits  Description: INTERVENTIONS:  - Monitor labs and assess patient for signs and symptoms of electrolyte imbalances  - Administer electrolyte replacement as ordered  - Monitor response to electrolyte replacements, including repeat lab results as appropriate  - Instruct patient on fluid and nutrition as appropriate  Outcome: Progressing  Goal: Fluid balance maintained  Description: INTERVENTIONS:  - Monitor labs   - Monitor I/O and WT  - Instruct patient on fluid and nutrition as appropriate  - Assess for signs & symptoms of volume excess or deficit  Outcome: Progressing  Goal: Glucose maintained within target range  Description: INTERVENTIONS:  - Monitor Blood Glucose as ordered  - Assess for signs and symptoms of hyperglycemia and hypoglycemia  - Administer ordered medications to maintain glucose within target range  - Assess nutritional intake and initiate nutrition service referral as needed  Outcome: Progressing     Problem: MUSCULOSKELETAL - ADULT  Goal: Maintain or return mobility to safest level of function  Description: INTERVENTIONS:  - Assess patient's ability to carry out ADLs; assess patient's baseline for ADL function and identify physical deficits which impact ability to perform ADLs (bathing, care of mouth/teeth, toileting, grooming, dressing, etc )  - Assess/evaluate cause of self-care deficits   - Assess range of motion  - Assess patient's mobility  - Assess patient's need for assistive devices and provide as appropriate  - Encourage maximum independence but intervene and supervise when necessary  - Involve family in performance of ADLs  - Assess for home care needs following discharge   - Consider OT consult to assist with ADL evaluation and planning for discharge  - Provide patient education as appropriate  Outcome: Progressing  Goal: Maintain proper alignment of affected body part  Description: INTERVENTIONS:  - Support, maintain and protect limb and body alignment  - Provide patient/ family with appropriate education  Outcome: Progressing     Problem: INFECTION - ADULT  Goal: Absence or prevention of progression during hospitalization  Description: INTERVENTIONS:  - Assess and monitor for signs and symptoms of infection  - Monitor lab/diagnostic results  - Monitor all insertion sites, i e  indwelling lines, tubes, and drains  - Monitor endotracheal if appropriate and nasal secretions for changes in amount and color  - [place] appropriate cooling/warming therapies per order  - Administer medications as ordered  - Instruct and encourage patient and family to use good hand hygiene technique  - Identify and instruct in appropriate isolation precautions for identified infection/condition  Outcome: Progressing     Problem: DISCHARGE PLANNING  Goal: Discharge to home or other facility with appropriate resources  Description: INTERVENTIONS:  - Identify barriers to discharge w/patient and caregiver  - Arrange for needed discharge resources and transportation as appropriate  - Identify discharge learning needs (meds, wound care, etc )  - Arrange for interpretive services to assist at discharge as needed  - Refer to Case Management Department for coordinating discharge planning if the patient needs post-hospital services based on physician/advanced practitioner order or complex needs related to functional status, cognitive ability, or social support system  Outcome: Progressing     Problem: Knowledge Deficit  Goal: Patient/family/caregiver demonstrates understanding of disease process, treatment plan, medications, and discharge instructions  Description: Complete learning assessment and assess knowledge base    Interventions:  - Provide teaching at level of understanding  - Provide teaching via preferred learning methods  Outcome: Progressing     Problem: SAFETY,RESTRAINT: NV/NON-SELF DESTRUCTIVE BEHAVIOR  Goal: Remains free of harm/injury (restraint for non violent/non self-detsructive behavior)  Description: INTERVENTIONS:  - Instruct patient/family regarding restraint use   - Assess and monitor physiologic and psychological status   - Provide interventions and comfort measures to meet assessed patient needs   - Identify and implement measures to help patient regain control  - Assess readiness for release of restraint   Outcome: Progressing  Goal: Returns to optimal restraint-free functioning  Description: INTERVENTIONS:  - Assess the patient's behavior and symptoms that indicate continued need for restraint  - Identify and implement measures to help patient regain control  - Assess readiness for release of restraint   Outcome: Progressing

## 2021-02-22 NOTE — ASSESSMENT & PLAN NOTE
· POA, likely multifactorial 2/2 ATN in setting of COVID-19 + contrast induced nephropathy + vanco toxicity  Baseline creatinine 1 0-1 3  · Seen by Nephrology during his hospital course   Renal function has improved , PATRICK resolved  · Currently on Bumex 2 mg daily  · Will hold Bumex dose tomorrow in view of rising creatinine levels with hypernatremia  · Continue to monitor renal function closely

## 2021-02-22 NOTE — CASE MANAGEMENT
Pt for d/c today back to [place]  CM made a referral to [facility] for a bls transport and is currently awaiting an assigned time  CM called and left a message for [facility] [place] admission [phone], advising them of the Pt d/c back to them today, requested report numbers and inquired if a COVID test was needed

## 2021-02-22 NOTE — ASSESSMENT & PLAN NOTE
· Has been hypotensive and on midodrine 10 mg q 8 hours  · Blood pressure stable and acceptable at this time

## 2021-02-22 NOTE — PROGRESS NOTES
Progress Note - Pulmonary   Moises Putnam 63 y o  male MRN: [id number]  Unit/Bed#: S -01 Encounter: [id number]    Assessment/Plan:    1  Acute hypoxic/hypercapnic respiratory failure likely secondary to multifaceted as listed below       -  currently 5 L-95%, patient does not wear home O2       -  continue maintain saturations greater than 89%       -  pulmonary toileting:  Deep breathing cough, OOB as tolerated, IS Q 1 hr    2  ESBL and E coli pneumonia       -  2/16/2021- completed 10 days Ertapenem       -  patient remains afebrile, WBC unremarkable, BC- negative       -  no indication for further antibiotic    3  COVID-19 1/17/2021       -  1/17/2021- convalescent plasma       -  1/28/2021- Actemra       -  2/1/2021-2/16/2021- intubated       -  2/15/2021- completed 31 days Decadron       -  continue Eliquis 2 5 b i d     4  Suspected volume overload in the setting of CHF and AFib       -  Echo 1/20- EF 55%       proBNP 1470       -  diuresing per primary team       -  Overall -13L, continue I&Os and daily weights    5  Suspected ANGELINA and ohs likely secondary to body habitus       -  continue BiPAP 16/8 cm of H2O at night and with naps       -  script has already been submitted to case management       -   recommend formal outpatient PSG    - outpatient follow-up per discharge instructions  - pulmonary will sign off at this time    Chief Complaint:    "I am so thirsty"    Subjective:    Ladoris Grandchild was comfortably sitting in his bed  Reports that he is very thirsty  No significant overnight events reported  Patient currently denying any fevers, chills, hemoptysis, headaches, night sweats, pleuritic chest pain, or palpitations  Objective:    Vitals: Blood pressure 107/59, pulse (!) 106, temperature 97 5 °F (36 4 °C), temperature source Oral, resp  rate (!) 24, height 5' 11" (1 803 m), weight (!) 164 kg (361 lb), SpO2 95 %  5L,Body mass index is 50 35 kg/m²        Intake/Output Summary (Last 24 hours) at 2/22/2021 1117  Last data filed at 2/22/2021 1001  Gross per 24 hour   Intake 480 ml   Output 2050 ml   Net -1570 ml       Invasive Devices     Peripheral Intravenous Line            Peripheral IV 02/19/21 Distal;Dorsal (posterior); Right Forearm 3 days          Drain            Urethral Catheter Straight-tip 16 Fr  36 days    Rectal Tube With balloon 9 days                Physical Exam:     Physical Exam  Constitutional:       Appearance: Normal appearance  He is obese  HENT:      Head: Normocephalic and atraumatic  Nose: Nose normal  No congestion or rhinorrhea  Mouth/Throat:      Mouth: Mucous membranes are dry  Pharynx: No oropharyngeal exudate or posterior oropharyngeal erythema  Neck:      Musculoskeletal: Normal range of motion and neck supple  No neck rigidity or muscular tenderness  Cardiovascular:      Rate and Rhythm: Normal rate and regular rhythm  Pulses: Normal pulses  Heart sounds: Normal heart sounds  No murmur  No friction rub  No gallop  Pulmonary:      Effort: Pulmonary effort is normal  No tachypnea, bradypnea, accessory muscle usage or respiratory distress  Breath sounds: No stridor, decreased air movement or transmitted upper airway sounds  Decreased breath sounds and rhonchi present  No wheezing or rales  Comments: Scattered wheezing and rhonchi throughout  Chest:      Chest wall: No tenderness  Abdominal:      General: Abdomen is flat  Bowel sounds are normal  There is no distension  Palpations: Abdomen is soft  There is no mass  Musculoskeletal: Normal range of motion  General: No swelling or tenderness  Skin:     General: Skin is warm and dry  Coloration: Skin is not jaundiced or pale  Neurological:      General: No focal deficit present  Mental Status: He is alert and oriented to person, place, and time  Mental status is at baseline     Psychiatric:         Mood and Affect: Mood normal          Behavior: Behavior normal          Labs:  I have personally reviewed pertinent lab results 2/21/2021    Imaging and other studies: I have personally reviewed pertinent films in PACS     Chest x-ray 2/14/2021- extensive bilateral opacities

## 2021-02-22 NOTE — CASE MANAGEMENT
MARCIA received a call from Margoth Padilla reporting she needs to get her Administration to advise if the Pt is to be considered a target, and confirmed the Pt will be requiring an auth prior to his return  Michelle Gannon reported she will follow up with MARCIA KENNEDY notified Dr Chaparro Oropeza, RN, [facility] and Pt's spouse aware of the Pt's cancelled d/c for today

## 2021-02-22 NOTE — DISCHARGE SUMMARY
Discharge- Adrien Pacheco 1960, 63 y o  male MRN: [id number]    Unit/Bed#: S -01 Encounter: [id number]    Primary Care Provider: Belen Leyva DO   Date and time admitted to hospital: 1/17/2021  4:16 PM        COVID-19  Assessment & Plan  · Patient confirmed [id number] positive 01/16/2021  Initially presented to [address] [facility] with respiratory distress and altered mental status  While at [facility], was intubated due to hypercapnia and hypoxia  He became hypotensive requiring pressor therapy and was transferred to [facility] for further critical care management of severe COVID-19 infection  Intubated on 01/17 and was subsequently extubated on 01/18n  · Patient was severe COVID treatment pathway  · Actemra given 1/28  · S/p Conv Plasma 1/17  · Was not a candidate for Remdesivir  · Completed 31 days of Decadron on 2/15/21  · Completed 10 day course of Ertapenem  For ESBL and E coli pneumonia superimposed on COVID-19 pneumonia  · Respiratory status currently stable on 4 L nasal cannula O2 with sats 97% at rest            Acute kidney injury superimposed on CKD ([facility] [address] )  Assessment & Plan  · POA, likely multifactorial 2/2 ATN in setting of COVID-19 + contrast induced nephropathy + vanco toxicity  Baseline creatinine 1 0-1 3  · Seen by Nephrology during his hospital course  Renal function has improved , PATRICK resolved  · Currently on Bumex 2 mg daily  · Will hold Bumex dose tomorrow in view of rising creatinine levels with hypernatremia  · Continue to monitor renal function closely    History of stroke  Assessment & Plan  · History of stroke 1 year ago    Currently long-term  resident at nursing facility  · Continue aspirin, statin, Eliquis    Seizure [facility])  Assessment & Plan  · No prior history of seizure disorder  · Had a witnessed tonic-clonic seizure episode on 2/1/21 and was seen by Neurology  · EEG 2/2/21 Background activities are too slow suggesting moderate diffuse cerebral dysfunction of nonspecific etiology  · Continue seizure precautions, Keppra 750 mg b i d  Hypernatremia  Assessment & Plan  · Had been on D5W which was discontinued on 2/17/21  · Sodium level remains slightly elevated at 149 this morning  · Holding off on additional IV fluids at this time in view of edema and concerns for volume overload  · Will continue to monitor and encourage increased oral free water intake    Anemia  Assessment & Plan  ·  Hemoglobin has been stable  Most recent check was 9 3    Atrial fibrillation (HCC)  Assessment & Plan  · Rate is well controlled  Continue eliquis 2 5mg BID  · Cardizem was discontinued due to hypotension  · He remains on metoprolol 25mg BID    Depression  Assessment & Plan  ·  Continue Zoloft 125 mg daily    Morbid obesity   Assessment & Plan  · Continue work with PT/OT  · Recommend therapeutic lifestyle changes to promote weight loss    History of aortic valve replacement with bioprosthetic valve  Assessment & Plan  · Echo 1/2021 - EF 55%, bioprosthetic AV with gradient of 16  · No acute issues at this time    Anticoagulation not required    Hypokalemia  Assessment & Plan  · Hypokalemia improving, up to 3 2 this morning  · Replaced with IV and PO K+  · BMP x 1 week to follow up K+ levels    Hyperlipidemia  Assessment & Plan  · Continue statin    Essential hypertension  Assessment & Plan  · Has been hypotensive and on midodrine 10 mg q 8 hours  · Blood pressure stable and acceptable at this time           Discharging Resident: Kannan Ness MD  Attending: No att  providers found  PCP: Mercy Martinez DO  Admission Date: 1/17/2021  Discharge Date: 03/04/21    Disposition:      Other: [facility]    For Discharges to   [facility] SN[facility]:   · Not Applicable to this Patient - Not Applicable to this Patient    Reason for Admission: COVID-19 PNA    Consultations During Hospital Stay:  · Infectious Disease  · Psychiatry  · Nephrology  · Neurology  · Palliative Care  · Interventional IR  · Dermatology    Procedures Performed:     · Central line Placement on 1/17/21    Medication Adjustments and Discharge Medications:  · Summary of Medication Adjustments made as a result of this hospitalization: Keppra 750mg BID  · Medication Dosing Tapers - Please refer to Discharge Medication List for details on any medication dosing tapers (if applicable to patient)  · Medications being temporarily held (include recommended restart time): None  · Discharge Medication List: See after visit summary for reconciled discharge medications  Wound Care Recommendations:  When applicable, please see wound care section of After Visit Summary  Diet Recommendations at Discharge:  Diet -        Diet Orders   (From admission, onward)             Start     Ordered    03/02/21 1515  Dietary nutrition supplements  Once     Question Answer Comment   Select Supplement: MightyShake    Frequency Lunch, Dinner        03/02/21 1515    02/20/21 1232  Diet Dysphagia/Modified Consistency; Dysphagia 2-Mechanical Soft; Nectar Thick Liquid; Sodium 2 GM, Fluid Restriction 1500 ML  Diet effective now     Comments: PO meds crushed   Question Answer Comment   Diet Type Dysphagia/Modified Consistency    Dysphagia/Modified Consistency Dysphagia 2-Mechanical Soft    Liquid Modifier Nectar Thick Liquid    Other Restriction(s): Sodium 2 GM    Other Restriction(s): Fluid Restriction 1500 ML    RD to adjust diet per protocol? Yes        02/20/21 1231    02/18/21 1248  Dietary nutrition supplements  Once     Question Answer Comment   Select Supplement: Magic Cup Chocolate    Frequency Lunch, Dinner        02/18/21 1247    01/17/21 1719  Room Service  Once     Question:  Type of Service  Answer:  Room Service- Not Appropriate    01/17/21 1718              Fluid Restriction - Continue Fluid Restriction as Listed Above at Discharge      Instructions for any Catheters / Lines Present at Discharge (including removal date, if applicable): Continue catheter care    Significant Findings / Test Results:     1/17/21 CTA chest abdomen & pelvis with contrast- Perihilar groundglass is favored to be on the basis of pulmonary edema rather than infection  Cardiomegaly is present  Right lower lobe endobronchial mucous plugging with associated atelectasis  Incidental Findings:   · none    Test Results Pending at Discharge (will require follow up): · None     Outpatient Tests Requested:  · None    Complications:  Rapid response called on 01/24/2021 for acute change in heart rate and was found to have atrial fibrillation with rapid ventricular response, patient given Cardizem, placed on telemetry and subsequently heart rates normalized  Patient weaned off Cardizem without issue                 Hospital Course:     Rosa Collins is a 63 y o  male with PMH of HTN, HLD, CKD3, Afib, CVA, anemia, Depression, and morbid obesity patient who originally presented to the hospital on 1/17/2021 from [place] on 1/17 where he was intubated for respiratory distress and AMS  He was found to be COVID + on 1/16 and given intubation required ICU care at [facility]  Patient was placed on the severe COVID treatment pathways and did receive Actemra on 1/28 and convalescent plasma on 1/17 and was successfuly extubated on 1/18  Patient clinically improved and eventually was transitioned to the [facility] service on 2/1  Unfortunately, that same day patient had a witnessed tonic clinic seizure, was intubated for airway protection and subsequently made critical care status once again  Neurology was consulted and patient was started on Keppra 750mg BID and will continue this medication at discharge  Ultimately, patient was found to have ESBL E coli PNA superimposed on his COVID PNA and completed a 10 day course of Ertapenem on 2/16/21  Patient remained intubated from 2/1/-2/16/21 and was successfully extubated on 2/16   Patient was transferred to the [facility] service on 2/17/ and has been receiving supportive care with monitoring K+ and repeleting as necessary, continued diuresis, and fluid restriction  Of note, patient did have episodes of depression understand during this admission for which he was evaluated by Psychiatry and no further concerns or interventions were required  Condition at Discharge: stable     Discharge Day Visit / Exam:     Subjective:  Patient able to respond correctly when questioned regarding the month/year  States that he feels "okay" this morning but is excited to be going home  Vitals: Blood Pressure: 128/64 (03/04/21 0758)  Pulse: 80 (03/04/21 0758)  Temperature: 97 8 °F (36 6 °C) (03/04/21 0758)  Temp Source: Oral (03/04/21 0758)  Respirations: 18 (03/04/21 0758)  Height: 5' 11" (180 3 cm) (02/09/21 0844)  Weight - Scale: (!) 153 kg (338 lb 3 oz) (03/04/21 0535)  SpO2: 97 % (03/04/21 0758)  Exam:   Physical Exam  Constitutional:       Appearance: He is well-developed  He is obese  HENT:      Head: Normocephalic and atraumatic  Nose: Nose normal    Eyes:      Conjunctiva/sclera: Conjunctivae normal    Neck:      Musculoskeletal: Normal range of motion and neck supple  Cardiovascular:      Rate and Rhythm: Normal rate and regular rhythm  Heart sounds: Normal heart sounds  Pulmonary:      Effort: Pulmonary effort is normal       Breath sounds: Normal breath sounds  No wheezing or rhonchi  Abdominal:      General: Bowel sounds are normal       Palpations: Abdomen is soft  Genitourinary:     Comments: Turner catheter in place  Rectal tube in place  Musculoskeletal: Normal range of motion  Skin:     General: Skin is warm and dry  Neurological:      Mental Status: He is alert and oriented to person, place, and time  Discussion with Family: Attempted to reach wife x 5 times since this morning, no VM set-up  Did reach out to the SNF and spoke to nursing regarding patient's transfer this morning back to [facility]   SNF made aware to direct patient to call [facility] if she has any medical questions or concerns  Goals of Care Discussions:  · Code Status at Discharge: Level 1 - Full Code  · Were there any Goals of Care Discussions during Hospitalization?: Yes  · Results of any General Goals of Care Discussions: Patient remains Level 1   · POLST Completed: No   · If POLST Completed, Summary of POLST Agreement Provided Here: None   · OK to Rehospitalize if Needed? Yes    Discharge instructions/Information to patient and family:   See after visit summary section titled Discharge Instructions for information provided to patient and family        Planned Readmission: None      ** Please Note: This note has been constructed using a voice recognition system **

## 2021-02-22 NOTE — CASE MANAGEMENT
After another attempt, MARCIA was finally able to speak to a Tana of [employer] of the Pt's d/c back to their facility today and was informed the the Pt will require a COVID test and a prior auth  MARCIA was in contact with jerri Steel at [facility] [phone] who requested the Pt's clinical be faxed to [phone] with a 50 turn around time for auth  Clinical faxed the requested information and will continue to follow

## 2021-02-22 NOTE — ASSESSMENT & PLAN NOTE
· History of stroke 1 year ago    Currently long-term  resident at nursing facility  · Continue aspirin, statin, Eliquis

## 2021-02-22 NOTE — ASSESSMENT & PLAN NOTE
· Patient confirmed COVID-19 positive 01/16/2021  Initially presented to [address] [facility] with respiratory distress and altered mental status  While at [facility], was intubated due to hypercapnia and hypoxia  He became hypotensive requiring pressor therapy and was transferred to [facility] for further critical care management of severe COVID-19 infection   Intubated on 01/17 and was subsequently extubated on 01/18n  · Patient was severe COVID treatment pathway  · Actemra given 1/28  · S/p Conv Plasma 1/17  · Was not a candidate for Remdesivir  · Completed 31 days of Decadron on 2/15/21  · Completed 10 day course of Ertapenem  For ESBL and E coli pneumonia superimposed on COVID-19 pneumonia  · Respiratory status currently stable on 4 L nasal cannula O2 with sats 97% at rest

## 2021-02-22 NOTE — ASSESSMENT & PLAN NOTE
· Had been on D5W which was discontinued on 2/17/21  · Sodium level remains slightly elevated at 149 this morning  · Holding off on additional IV fluids at this time in view of edema and concerns for volume overload  · Will continue to monitor and encourage increased oral free water intake

## 2021-02-22 NOTE — PROGRESS NOTES
Progress Note - Nuha Lewis 1960, 63 y o  male MRN: [id number]    Unit/Bed#: S -01 Encounter: [id number]    Primary Care Provider: Violeta Mckeon DO   Date and time admitted to hospital: 1/17/2021  4:16 PM        COVID-19  Assessment & Plan  Patient confirmed [id number] positive 01/16/2021  Initially presented to [address] [facility] [facility] with respiratory distress and altered mental status  While at [facility], was intubated due to hypercapnia and hypoxia  He became hypotensive requiring pressor therapy and was [facility] for further critical care management of severe COVID-19 infection  Cam from [place] intubated on 01/17 and was subsequently extubated on 01/18  Plan  ·  Patient was severe COVID treatment pathway  · Actemra given 1/28  · S/p Conv Plasma 1/17  · Patient was considered a candidate for remdesivir  · ESBL and E coli pneumonia superimposed on COVID-19 pneumonia Ertapenum completed a 10 day   · Completed 31 days of Decadron on 02/15/2021                 Acute kidney injury superimposed on CKD [facility])  Assessment & Plan  POA, likely multifactorial 2/2 ATN in setting of COVID-19 + contrast induced nephropathy + vanco toxicity  Baseline creatinine 1 0-1 3    Plan    · Turner catheter in place  · Monitor I/Os  · Currently on Bumex 2 mg twice daily  · Monitor renal indices    History of stroke  Assessment & Plan  History of stroke 1 year ago  Now baseline resident at [facility] facility  Plan  · Continue Eliquis daily  · Continue ASA daily         Seizure [facility])  Assessment & Plan  Patient does not have a seizure history but does have history of CVA in the past  On 02/01 patient had a witnessed tonic clonic seizure episode and Neurology was consulted      Plan  · Continue on Keppra 750mg BID  · EEG 2/2/21 Background activities are too slow suggesting moderate diffuse cerebral dysfunction of nonspecific etiology  · Seizure precautions  · 2/1/2021-2/16/2021- intubated    Hypernatremia  Assessment & Plan  Patient previously being followed by Nephrology, was on D5W which was d/c as of 2/17  Plan  · Na remains elevated but stable  · Will continue to monitor with BMP      Anemia  Assessment & Plan  · No active bleeding  · Trend CBC daily   · hgb stable    · Transfuse for Hgb < 7 0     Atrial fibrillation (HCC)  Assessment & Plan  · Continue eliquis 2 5mg BID  · Holding cardizem secondary to hypotension  · Continue metoprolol 25mg BID    Depression  Assessment & Plan  · Continue sertraline 125 mg   · Per psych eval-patient is not suicidal, does not require 1:1  · Required to return to nursing facility    Morbid obesity   Assessment & Plan  · Continue work with PT/OT  · Recommend therapeutic lifestyle changes to promote weight loss    Dysphagia  Assessment & Plan  · Tolerating puree and nectar thick liquids by straw w/o overt dysphagia  · Dysphagia-2 diet with NT per speech pathology    History of aortic valve replacement with bioprosthetic valve  Assessment & Plan  · Echo 1/2021 - EF 55%, bioprosthetic AV with gradient of 16  · Murmur present at baseline  · Resume home cardizem    Hypokalemia  Assessment & Plan  · Hypokalemia this am at 3 1  · Restarted Kdur 40mg BID  · Continue to replace PRN  · Monitor with BMP    Hyperlipidemia  Assessment & Plan  · Continue statin    Essential hypertension  Assessment & Plan  · Hold home cardizem in setting of hypotension  · Continue metoprolol 25mg BID for rate control  · BP's stable on Midodrine TID          VTE Pharmacologic Prophylaxis:   Pharmacologic: Apixaban (Eliquis)  Mechanical VTE Prophylaxis in Place: Yes    Discussions with Specialists or Other Care Team Provider: Psych, Speech pathology    Education and Discussions with Family / Patient:  Will update family    Current Length of Stay: 39 day(s)    Current Patient Status: Inpatient     Discharge Plan / Estimated Discharge Date: Patient was scheduled to discharge today as he is medically stable  However, per CM patient requires prior authorization to return to home facility and further review of his record is required as he was noted to be a target patient per facility  Code Status: Level 1 - Full Code      Subjective:   Patient states he is doing okay this morning reports breathing is fine  Objective:     Vitals:   Temp (24hrs), Av 3 °F (36 8 °C), Min:97 5 °F (36 4 °C), Max:99 1 °F (37 3 °C)    Temp:  [97 5 °F (36 4 °C)-99 1 °F (37 3 °C)] 98 4 °F (36 9 °C)  HR:  [105-107] 105  Resp:  [24-26] 26  BP: (107-116)/(57-66) 115/66  SpO2:  [93 %-98 %] 93 %  Body mass index is 50 35 kg/m²  Input and Output Summary (last 24 hours): Intake/Output Summary (Last 24 hours) at 2021 1608  Last data filed at 2021 1401  Gross per 24 hour   Intake 810 ml   Output 2800 ml   Net -1990 ml       Physical Exam:     Physical Exam  Constitutional:       Appearance: He is obese  HENT:      Head: Normocephalic and atraumatic  Right Ear: External ear normal       Left Ear: External ear normal       Nose: Nose normal    Eyes:      Conjunctiva/sclera: Conjunctivae normal    Cardiovascular:      Heart sounds: Normal heart sounds  Pulmonary:      Effort: Pulmonary effort is normal       Breath sounds: No rhonchi or rales  Comments: On 6L NC    Abdominal:      General: Bowel sounds are normal    Genitourinary:     Comments: Turner catheter in place  Musculoskeletal:      Right lower leg: Edema present  Left lower leg: Edema present  Skin:     General: Skin is warm and dry  Neurological:      Mental Status: Mental status is at baseline     Psychiatric:         Mood and Affect: Mood normal          Additional Data:     Labs:    Results from last 7 days   Lab Units 21  0915   WBC Thousand/uL 9 20   HEMOGLOBIN g/dL 8 4*   HEMATOCRIT % 31 0*   PLATELETS Thousands/uL 205   NEUTROS PCT % 72   LYMPHS PCT % 7*   MONOS PCT % 8   EOS PCT % 10*     Results from last 7 days Lab Units 02/21/21  0912   POTASSIUM mmol/L 3 4*   CHLORIDE mmol/L 112*   CO2 mmol/L 25   BUN mg/dL 33*   CREATININE mg/dL 1 23   CALCIUM mg/dL 8 7   ALK PHOS U/L 71   ALT U/L 31   AST U/L 22           * I Have Reviewed All Lab Data Listed Above  * Additional Pertinent Lab Tests Reviewed: All Labs Within Last 24 Hours Reviewed    Imaging:    Imaging Reports Reviewed Today Include: None  Imaging Personally Reviewed by Myself Includes:  As above    Recent Cultures (last 7 days):     Results from last 7 days   Lab Units 02/17/21  1007   C DIFF TOXIN B BY PCR  Negative       Last 24 Hours Medication List:   Current Facility-Administered Medications   Medication Dose Route Frequency Provider Last Rate    acetaminophen  650 mg Oral Q6H PRN Walterine Sales, DO      apixaban  2 5 mg Oral BID Walterine Sales, DO      aspirin  81 mg Oral Daily Walterine Sales, DO      atorvastatin  40 mg Per NG Tube HS Walterine Sales, DO      bumetanide  2 mg Oral Daily iNkolas Mobley MD      cholecalciferol  2,000 Units Per NG Tube Daily Walterine Sales, DO      insulin lispro  1-5 Units Subcutaneous HS KATHY Ross      insulin lispro  2-12 Units Subcutaneous TID AC KATHY Lenoard      levETIRAcetam  750 mg Oral Q12H [address], DO      melatonin  6 mg Oral HS Walterine Sales, DO      metoprolol tartrate  25 mg Oral Q12H [address], DO      midodrine  10 mg Oral Q8H Walterine Sales, DO      multivitamin-minerals  1 tablet Oral Daily Walterine Sales, DO      omeprazole (PRILOSEC) suspension 2 mg/mL  20 mg Oral Q12H Walterine Sales, DO      potassium chloride  40 mEq Oral TID With Meals Nikolas Mobley MD      sertraline  125 mg Oral Daily Johnny Puente MD          Today, Patient Was Seen By: Joe Smith MD    ** Please Note: This note has been constructed using a voice recognition system   **

## 2021-02-22 NOTE — CASE MANAGEMENT
CM received a call back from [facility], reporting her Administration is requesting the Pt be Optioned prior to returning to their facility

## 2021-02-22 NOTE — ASSESSMENT & PLAN NOTE
· Echo 1/2021 - EF 55%, bioprosthetic AV with gradient of 16  · No acute issues at this time    Anticoagulation not required

## 2021-02-22 NOTE — ASSESSMENT & PLAN NOTE
· Rate is well controlled   Continue eliquis 2 5mg BID  · Cardizem was discontinued due to hypotension  · He remains on metoprolol 25mg BID

## 2021-02-23 LAB
ANION GAP SERPL CALCULATED.3IONS-SCNC: 10 MMOL/L (ref 4–13)
BASOPHILS # BLD AUTO: 0.05 THOUSANDS/ΜL (ref 0–0.1)
BASOPHILS NFR BLD AUTO: 1 % (ref 0–1)
BUN SERPL-MCNC: 31 MG/DL (ref 5–25)
CALCIUM SERPL-MCNC: 9 MG/DL (ref 8.3–10.1)
CHLORIDE SERPL-SCNC: 111 MMOL/L (ref 100–108)
CO2 SERPL-SCNC: 23 MMOL/L (ref 21–32)
CREAT SERPL-MCNC: 1.28 MG/DL (ref 0.6–1.3)
EOSINOPHIL # BLD AUTO: 0.91 THOUSAND/ΜL (ref 0–0.61)
EOSINOPHIL NFR BLD AUTO: 12 % (ref 0–6)
ERYTHROCYTE [DISTWIDTH] IN BLOOD BY AUTOMATED COUNT: 25.2 % (ref 11.6–15.1)
GFR SERPL CREATININE-BSD FRML MDRD: 60 ML/MIN/1.73SQ M
GLUCOSE SERPL-MCNC: 100 MG/DL (ref 65–140)
GLUCOSE SERPL-MCNC: 124 MG/DL (ref 65–140)
GLUCOSE SERPL-MCNC: 90 MG/DL (ref 65–140)
GLUCOSE SERPL-MCNC: 92 MG/DL (ref 65–140)
GLUCOSE SERPL-MCNC: 99 MG/DL (ref 65–140)
HCT VFR BLD AUTO: 33.7 % (ref 36.5–49.3)
HGB BLD-MCNC: 9.5 G/DL (ref 12–17)
IMM GRANULOCYTES # BLD AUTO: 0.12 THOUSAND/UL (ref 0–0.2)
IMM GRANULOCYTES NFR BLD AUTO: 2 % (ref 0–2)
LYMPHOCYTES # BLD AUTO: 0.57 THOUSANDS/ΜL (ref 0.6–4.47)
LYMPHOCYTES NFR BLD AUTO: 7 % (ref 14–44)
MCH RBC QN AUTO: 24.9 PG (ref 26.8–34.3)
MCHC RBC AUTO-ENTMCNC: 28.2 G/DL (ref 31.4–37.4)
MCV RBC AUTO: 89 FL (ref 82–98)
MONOCYTES # BLD AUTO: 0.53 THOUSAND/ΜL (ref 0.17–1.22)
MONOCYTES NFR BLD AUTO: 7 % (ref 4–12)
NEUTROPHILS # BLD AUTO: 5.52 THOUSANDS/ΜL (ref 1.85–7.62)
NEUTS SEG NFR BLD AUTO: 71 % (ref 43–75)
NRBC BLD AUTO-RTO: 0 /100 WBCS
PLATELET # BLD AUTO: 265 THOUSANDS/UL (ref 149–390)
PMV BLD AUTO: 10.7 FL (ref 8.9–12.7)
POTASSIUM SERPL-SCNC: 3 MMOL/L (ref 3.5–5.3)
RBC # BLD AUTO: 3.81 MILLION/UL (ref 3.88–5.62)
SODIUM SERPL-SCNC: 144 MMOL/L (ref 136–145)
WBC # BLD AUTO: 7.7 THOUSAND/UL (ref 4.31–10.16)

## 2021-02-23 PROCEDURE — 94762 N-INVAS EAR/PLS OXIMTRY CONT: CPT

## 2021-02-23 PROCEDURE — 80048 BASIC METABOLIC PNL TOTAL CA: CPT | Performed by: FAMILY MEDICINE

## 2021-02-23 PROCEDURE — 94003 VENT MGMT INPAT SUBQ DAY: CPT

## 2021-02-23 PROCEDURE — 82948 REAGENT STRIP/BLOOD GLUCOSE: CPT

## 2021-02-23 PROCEDURE — 85025 COMPLETE CBC W/AUTO DIFF WBC: CPT | Performed by: FAMILY MEDICINE

## 2021-02-23 PROCEDURE — 99231 SBSQ HOSP IP/OBS SF/LOW 25: CPT | Performed by: INTERNAL MEDICINE

## 2021-02-23 PROCEDURE — 94760 N-INVAS EAR/PLS OXIMETRY 1: CPT

## 2021-02-23 RX ORDER — POTASSIUM CHLORIDE 14.9 MG/ML
20 INJECTION INTRAVENOUS
Status: COMPLETED | OUTPATIENT
Start: 2021-02-23 | End: 2021-02-23

## 2021-02-23 RX ORDER — LIDOCAINE 50 MG/G
1 PATCH TOPICAL DAILY PRN
Status: DISCONTINUED | OUTPATIENT
Start: 2021-02-23 | End: 2021-03-04 | Stop reason: HOSPADM

## 2021-02-23 RX ORDER — POTASSIUM CHLORIDE 20 MEQ/1
20 TABLET, EXTENDED RELEASE ORAL ONCE
Status: COMPLETED | OUTPATIENT
Start: 2021-02-23 | End: 2021-02-23

## 2021-02-23 RX ADMIN — ACETAMINOPHEN 650 MG: 325 TABLET, FILM COATED ORAL at 00:23

## 2021-02-23 RX ADMIN — MIDODRINE HYDROCHLORIDE 10 MG: 5 TABLET ORAL at 14:41

## 2021-02-23 RX ADMIN — METOPROLOL TARTRATE 25 MG: 25 TABLET, FILM COATED ORAL at 08:17

## 2021-02-23 RX ADMIN — APIXABAN 2.5 MG: 2.5 TABLET, FILM COATED ORAL at 17:08

## 2021-02-23 RX ADMIN — Medication 6 MG: at 22:03

## 2021-02-23 RX ADMIN — LEVETIRACETAM 750 MG: 500 TABLET, FILM COATED ORAL at 08:15

## 2021-02-23 RX ADMIN — POTASSIUM CHLORIDE 40 MEQ: 1500 TABLET, EXTENDED RELEASE ORAL at 11:40

## 2021-02-23 RX ADMIN — Medication 20 MG: at 17:10

## 2021-02-23 RX ADMIN — ASPIRIN 81 MG CHEWABLE TABLET 81 MG: 81 TABLET CHEWABLE at 08:16

## 2021-02-23 RX ADMIN — DESMOPRESSIN ACETATE 40 MG: 0.2 TABLET ORAL at 22:04

## 2021-02-23 RX ADMIN — POTASSIUM CHLORIDE 40 MEQ: 1500 TABLET, EXTENDED RELEASE ORAL at 07:34

## 2021-02-23 RX ADMIN — ACETAMINOPHEN 650 MG: 325 TABLET, FILM COATED ORAL at 08:15

## 2021-02-23 RX ADMIN — SERTRALINE 125 MG: 25 TABLET, FILM COATED ORAL at 08:15

## 2021-02-23 RX ADMIN — METOPROLOL TARTRATE 25 MG: 25 TABLET, FILM COATED ORAL at 22:11

## 2021-02-23 RX ADMIN — BUMETANIDE 2 MG: 1 TABLET ORAL at 08:16

## 2021-02-23 RX ADMIN — POTASSIUM CHLORIDE 40 MEQ: 1500 TABLET, EXTENDED RELEASE ORAL at 15:48

## 2021-02-23 RX ADMIN — MIDODRINE HYDROCHLORIDE 10 MG: 5 TABLET ORAL at 22:04

## 2021-02-23 RX ADMIN — LEVETIRACETAM 750 MG: 500 TABLET, FILM COATED ORAL at 22:03

## 2021-02-23 RX ADMIN — POTASSIUM CHLORIDE 20 MEQ: 14.9 INJECTION, SOLUTION INTRAVENOUS at 09:20

## 2021-02-23 RX ADMIN — POTASSIUM CHLORIDE 20 MEQ: 14.9 INJECTION, SOLUTION INTRAVENOUS at 07:30

## 2021-02-23 RX ADMIN — Medication 20 MG: at 05:03

## 2021-02-23 RX ADMIN — POTASSIUM CHLORIDE 20 MEQ: 1500 TABLET, EXTENDED RELEASE ORAL at 07:34

## 2021-02-23 RX ADMIN — ACETAMINOPHEN 650 MG: 325 TABLET, FILM COATED ORAL at 14:41

## 2021-02-23 RX ADMIN — Medication 2000 UNITS: at 08:15

## 2021-02-23 RX ADMIN — MIDODRINE HYDROCHLORIDE 10 MG: 5 TABLET ORAL at 08:15

## 2021-02-23 RX ADMIN — Medication 1 TABLET: at 08:16

## 2021-02-23 RX ADMIN — APIXABAN 2.5 MG: 2.5 TABLET, FILM COATED ORAL at 08:16

## 2021-02-23 NOTE — CASE MANAGEMENT
CM was in contact with [facility] [address] with a review of [facility]'s request for the Pt to be Optioned  Gloria Almonte reported the Pt was Optioned in 2019 prior to his admission to [facility] and was deemed NOT to be a target  Gloria Almonte reported she will be in contact with [facility] admission Isabelle Brain for further clarification of their request for the Pt to be Optioned this time  MARCIA will continue to follow

## 2021-02-23 NOTE — QUICK NOTE
I received a call from respiratory therapy that the patient had desatted to 84% on room air after removing his nasal cannula oxygen  The patient was refusing nasal cannula oxygen and BiPAP at that time  He felt that he should not need to use the oxygen if he did not want to and that he was okay if he   I evaluated the patient and discussed with him that he should be on at least nasal cannula oxygen and might need BiPAP overnight if required  He wanted a drink of water and I stated that we could give this to him  He is now currently saturating 97% on 5 L nasal cannula  I also spoke with his wife during this time as he is a full code, he does not seem fully oriented, and was refusing medical treatment at that time  His wife states that she wishes for him to remain a full code

## 2021-02-23 NOTE — PLAN OF CARE
Problem: Prexisting or High Potential for Compromised Skin Integrity  Goal: Skin integrity is maintained or improved  Description: INTERVENTIONS:  - Identify patients at risk for skin breakdown  - Assess and monitor skin integrity  - Assess and monitor nutrition and hydration status  - Monitor labs   - Assess for incontinence   - Turn and reposition patient  - Assist with mobility/ambulation  - Relieve pressure over bony prominences  - Avoid friction and shearing  - Provide appropriate hygiene as needed including keeping skin clean and dry  - Evaluate need for skin moisturizer/barrier cream  - Collaborate with interdisciplinary team   - Patient/family teaching  - Consider wound care consult   Outcome: Progressing     Problem: Potential for Falls  Goal: Patient will remain free of falls  Description: INTERVENTIONS:  - Assess patient frequently for physical needs  -  Identify cognitive and physical deficits and behaviors that affect risk of falls    -  [place] fall precautions as indicated by assessment   - Educate patient/family on patient safety including physical limitations  - Instruct patient to call for assistance with activity based on assessment  - Modify environment to reduce risk of injury  - Consider OT/PT consult to assist with strengthening/mobility  Outcome: Progressing     Problem: SAFETY,RESTRAINT: NV/NON-SELF DESTRUCTIVE BEHAVIOR  Goal: Remains free of harm/injury (restraint for non violent/non self-detsructive behavior)  Description: INTERVENTIONS:  - Instruct patient/family regarding restraint use   - Assess and monitor physiologic and psychological status   - Provide interventions and comfort measures to meet assessed patient needs   - Identify and implement measures to help patient regain control  - Assess readiness for release of restraint   Outcome: Progressing  Goal: Returns to optimal restraint-free functioning  Description: INTERVENTIONS:  - Assess the patient's behavior and symptoms that indicate continued need for restraint  - Identify and implement measures to help patient regain control  - Assess readiness for release of restraint   Outcome: Progressing     Problem: Nutrition/Hydration-ADULT  Goal: Nutrient/Hydration intake appropriate for improving, restoring or maintaining nutritional needs  Description: Monitor and assess patient's nutrition/hydration status for malnutrition  Collaborate with interdisciplinary team and initiate plan and interventions as ordered  Monitor patient's weight and dietary intake as ordered or per policy  Utilize nutrition screening tool and intervene as necessary  Determine patient's food preferences and provide high-protein, high-caloric foods as appropriate       INTERVENTIONS:  - Monitor oral intake, urinary output, labs, and treatment plans  - Assess nutrition and hydration status and recommend course of action  - Evaluate amount of meals eaten  - Assist patient with eating if necessary   - Allow adequate time for meals  - Recommend/ encourage appropriate diets, oral nutritional supplements, and vitamin/mineral supplements  - Order, calculate, and assess calorie counts as needed  - Recommend, monitor, and adjust tube feedings and TPN/PPN based on assessed needs  - Assess need for intravenous fluids  - Provide specific nutrition/hydration education as appropriate  - Include patient/family/caregiver in decisions related to nutrition  Outcome: Progressing     Problem: NEUROSENSORY - ADULT  Goal: Achieves stable or improved neurological status  Description: INTERVENTIONS  - Monitor and report changes in neurological status  - Monitor vital signs such as temperature, blood pressure, glucose, and any other labs ordered   - Initiate measures to prevent increased intracranial pressure  - Monitor for seizure activity and implement precautions if appropriate      Outcome: Progressing  Goal: Achieves maximal functionality and self care  Description: INTERVENTIONS  - Monitor swallowing and airway patency with patient fatigue and changes in neurological status  - Encourage and assist patient to increase activity and self care     - Encourage visually impaired, hearing impaired and aphasic patients to use assistive/communication devices  Outcome: Progressing     Problem: CARDIOVASCULAR - ADULT  Goal: Maintains optimal cardiac output and hemodynamic stability  Description: INTERVENTIONS:  - Monitor I/O, vital signs and rhythm  - Monitor for S/S and trends of decreased cardiac output  - Administer and titrate ordered vasoactive medications to optimize hemodynamic stability  - Assess quality of pulses, skin color and temperature  - Assess for signs of decreased coronary artery perfusion  - Instruct patient to report change in severity of symptoms  Outcome: Progressing  Goal: Absence of cardiac dysrhythmias or at baseline rhythm  Description: INTERVENTIONS:  - Continuous cardiac monitoring, vital signs, obtain 12 lead EKG if ordered  - Administer antiarrhythmic and heart rate control medications as ordered  - Monitor electrolytes and administer replacement therapy as ordered  Outcome: Progressing     Problem: RESPIRATORY - ADULT  Goal: Achieves optimal ventilation and oxygenation  Description: INTERVENTIONS:  - Assess for changes in respiratory status  - Assess for changes in mentation and behavior  - Position to facilitate oxygenation and minimize respiratory effort  - Oxygen administered by appropriate delivery if ordered  - Initiate smoking cessation education as indicated  - Encourage broncho-pulmonary hygiene including cough, deep breathe, Incentive Spirometry  - Assess the need for suctioning and aspirate as needed  - Assess and instruct to report SOB or any respiratory difficulty  - Respiratory Therapy support as indicated  Outcome: Progressing     Problem: GENITOURINARY - ADULT  Goal: Maintains or returns to baseline urinary function  Description: INTERVENTIONS:  - Assess urinary function  - Encourage oral fluids to ensure adequate hydration if ordered  - Administer IV fluids as ordered to ensure adequate hydration  - Administer ordered medications as needed  - Offer frequent toileting  - Follow urinary retention protocol if ordered  Outcome: Progressing  Goal: Absence of urinary retention  Description: INTERVENTIONS:  - Assess patients ability to void and empty bladder  - Monitor I/O  - Bladder scan as needed  - Discuss with physician/AP medications to alleviate retention as needed  - Discuss catheterization for long term situations as appropriate  Outcome: Progressing  Goal: Urinary catheter remains patent  Description: INTERVENTIONS:  - Assess patency of urinary catheter  - If patient has a chronic bo, consider changing catheter if non-functioning  - Follow guidelines for intermittent irrigation of non-functioning urinary catheter  Outcome: Progressing     Problem: MUSCULOSKELETAL - ADULT  Goal: Maintain or return mobility to safest level of function  Description: INTERVENTIONS:  - Assess patient's ability to carry out ADLs; assess patient's baseline for ADL function and identify physical deficits which impact ability to perform ADLs (bathing, care of mouth/teeth, toileting, grooming, dressing, etc )  - Assess/evaluate cause of self-care deficits   - Assess range of motion  - Assess patient's mobility  - Assess patient's need for assistive devices and provide as appropriate  - Encourage maximum independence but intervene and supervise when necessary  - Involve family in performance of ADLs  - Assess for home care needs following discharge   - Consider OT consult to assist with ADL evaluation and planning for discharge  - Provide patient education as appropriate  Outcome: Progressing  Goal: Maintain proper alignment of affected body part  Description: INTERVENTIONS:  - Support, maintain and protect limb and body alignment  - Provide patient/ family with appropriate education  Outcome: Progressing     Problem: DISCHARGE PLANNING  Goal: Discharge to home or other facility with appropriate resources  Description: INTERVENTIONS:  - Identify barriers to discharge w/patient and caregiver  - Arrange for needed discharge resources and transportation as appropriate  - Identify discharge learning needs (meds, wound care, etc )  - Arrange for interpretive services to assist at discharge as needed  - Refer to Case Management Department for coordinating discharge planning if the patient needs post-hospital services based on physician/advanced practitioner order or complex needs related to functional status, cognitive ability, or social support system  Outcome: Progressing     Problem: Knowledge Deficit  Goal: Patient/family/caregiver demonstrates understanding of disease process, treatment plan, medications, and discharge instructions  Description: Complete learning assessment and assess knowledge base    Interventions:  - Provide teaching at level of understanding  - Provide teaching via preferred learning methods  Outcome: Progressing

## 2021-02-23 NOTE — QUICK NOTE
Neurology Quick Note - Gin Quinones 63 y o  male   MRN: [id number] Unit/Bed#: S -01 Encounter: [id number]    Neurology originally saw this patient in consultation to the critical care service, on 2-21  His history is that of a 63 y o  male with multiple prior strokes with mild residual left arm weakness and bilateral LE weakness, atrial fibrillation not on anticoagulation due to history of GI bleeding, HTN, HLD, and obesity who presented to [facility] ED on 1/17/2021 from A residential care facility due to respiratory distress, fever, AMS, and COVID positive status  Due to hypercapnia and hypoxia, patient was emergently intubated and transferred to [facility] ICU for further management  Patient was successfully extubated on 1/18 and transferred to the floor; however, required intermittent BiPAP and high flow nasal cannula due to low O2 sats  On 2/1/2021, patient's O2 sats were in the mid 80s, so patient was transferred back to the ICU  He was started on cefepime and Flagyl due to concern for pneumonia  The evening of 02/01/2021, patient had witnessed tonic-clonic seizure activity, was intubated for airway protection, and received Ativan 2 mg, propofol, succinylcholine, and etomidate with cessation of seizure after approximately 9 minutes  Patient was loaded with Keppra 2 g and started on maintenance Keppra 1 g BID  CT head showed stable encephalomalacia in the bilateral posterior parietal lobes, left anterior frontal lobe, and left cerebellum, but no hemorrhage or acute intracranial abnormalities  No known seizure history  Etiology for new onset seizure likely due to having a lowered seizure threshold from multiple prior strokes, current COVID-19 infection, transient hypoxia, and use of cefepime  the patient was subsequently maintained in the ICU for approximate the next 2 weeks    On the 17th he was last seen by Neurology, and today he has been doing well out on the floor for the past few days     This patient has just had a rectal tube inserted and reports he is exhausted and wants no exam     he is alert oriented and able to report basic biographical and medical history facts  His exam was deferred  I did discuss with him that he did have an Epileptic seizure  2 weeks ago and that we currently have him on anti epileptic medication  He has been maintained on Keppra for essentially the last 3 weeks without issue  He remains on Eliquis as well as 81 mg aspirin and also 40 mg of Lipitor for stroke risk reduction as well  We need to see this gentleman in the office in approximately 4-6 weeks as an outpatient  I have discussed with him that he will likely stay on this medication for several months as he continues to recover from his multiple medical problems  He had several questions I believe they were all answered to his satisfaction at this time  Neurology will sign off at this time please re-consult us should there be any acute neurologic issue with this patient

## 2021-02-23 NOTE — PROGRESS NOTES
Patient removed his 4 Liters NC, continuous pulse ox alarming 85%, 83% with exertion  Patient refusing to reapply  Patient states, "Its my life I can do what I want, you are not putting it back on", "Let me go"  Respiratory at bedside with patient  Patient refusing to allow Respiratory to re apply as well  Noelle Chen made aware and at bedside

## 2021-02-23 NOTE — PROGRESS NOTES
Progress Note - Emiliano Chopra 1960, 63 y o  male MRN: [id number]    Unit/Bed#: S -01 Encounter: [id number]    Primary Care Provider: Dariusz Tyler DO   Date and time admitted to hospital: 1/17/2021  4:16 PM        COVID-19  Assessment & Plan  Patient confirmed [id number] positive 01/16/2021  Initially presented to [address] [place] [address] with respiratory distress and altered mental status  While at [facility], was intubated due to hypercapnia and hypoxia  He became hypotensive requiring pressor therapy and was transferred to [facility] for further critical care management of severe COVID-19 infection  Cam from [facility] intubated on 01/17 and was subsequently extubated on 01/18  Plan  ·  Patient was severe COVID treatment pathway  · Actemra given 1/28  · S/p Conv Plasma 1/17  · Patient was considered a candidate for remdesivir  · ESBL and E coli pneumonia superimposed on COVID-19 pneumonia Ertapenum completed a 10 day   · Completed 31 days of Decadron on 02/15/2021                 Acute kidney injury superimposed on CKD [facility])  Assessment & Plan  POA, likely multifactorial 2/2 ATN in setting of COVID-19 + contrast induced nephropathy + vanco toxicity  Baseline creatinine 1 0-1 3    Plan    · Turner catheter in place  · Monitor I/Os  · Currently on Bumex 2 mg twice daily  · Monitor renal indices    History of stroke  Assessment & Plan  History of stroke 1 year ago  Now baseline resident at [facility]  Plan  · Continue Eliquis daily  · Continue ASA daily         Seizure [facility])  Assessment & Plan  Patient does not have a seizure history but does have history of CVA in the past  On 02/01 patient had a witnessed tonic clonic seizure episode and Neurology was consulted      Plan  · Continue on Keppra 750mg BID  · EEG 2/2/21 Background activities are too slow suggesting moderate diffuse cerebral dysfunction of nonspecific etiology  · Seizure precautions  · 2/1/2021-2/16/2021- intubated    Hypernatremia  Assessment & Plan  Patient previously being followed by Nephrology, was on D5W which was d/c as of 2/17  Plan  · Na remains elevated but stable  · Will continue to monitor with BMP      Anemia  Assessment & Plan  · No active bleeding  · Trend CBC daily   · hgb stable    · Transfuse for Hgb < 7 0     Atrial fibrillation (HCC)  Assessment & Plan  · Continue eliquis 2 5mg BID  · Holding cardizem secondary to hypotension  · Continue metoprolol 25mg BID    Depression  Assessment & Plan  · Continue sertraline 125 mg   · Per psych eval-patient is not suicidal, does not require 1:1  · Required to return to nursing facility    Morbid obesity   Assessment & Plan  · Continue work with PT/OT  · Recommend therapeutic lifestyle changes to promote weight loss    Dysphagia  Assessment & Plan  · Tolerating puree and nectar thick liquids by straw w/o overt dysphagia  · Dysphagia-2 diet with NT per speech pathology    History of aortic valve replacement with bioprosthetic valve  Assessment & Plan  · Echo 1/2021 - EF 55%, bioprosthetic AV with gradient of 16  · Murmur present at baseline  · Resume home cardizem    Hypokalemia  Assessment & Plan  Hypokalemia this am at 3 0    Plan  · Continue Kdur 40mg BID  · Continue to replace PRN  · Monitor with BMP    Hyperlipidemia  Assessment & Plan  · Continue statin    Essential hypertension  Assessment & Plan  · Hold home cardizem in setting of hypotension  · Continue metoprolol 25mg BID for rate control  · BP's stable on Midodrine TID          VTE Pharmacologic Prophylaxis:   Pharmacologic: Apixaban (Eliquis)  Mechanical VTE Prophylaxis in Place: Yes    Discussions with Specialists or Other Care Team Provider: Psych, Speech pathology    Education and Discussions with Family / Patient:  Will update family    Current Length of Stay: 40 day(s)    Current Patient Status: Inpatient     Discharge Plan / Estimated Discharge Date: Patient was scheduled to discharge today as he is medically stable  However, per CM patient requires prior authorization to return to home facility and further review of his record is required as he was noted to be a target patient per facility  Code Status: Level 1 - Full Code      Subjective:   Per nursing, overnight patient became agitated did not use BiPAP and refused oral medications  This morning patient is, but understandably upset that he must remain hospitalized at this time  Objective:     Vitals:   Temp (24hrs), Av °F (36 7 °C), Min:97 3 °F (36 3 °C), Max:98 4 °F (36 9 °C)    Temp:  [97 3 °F (36 3 °C)-98 4 °F (36 9 °C)] 98 2 °F (36 8 °C)  HR:  [105-115] 110  Resp:  [22-26] 22  BP: (112-121)/(63-66) 121/63  SpO2:  [85 %-98 %] 98 %  Body mass index is 50 73 kg/m²  Input and Output Summary (last 24 hours): Intake/Output Summary (Last 24 hours) at 2021 0503  Last data filed at 2021 2239  Gross per 24 hour   Intake 360 ml   Output 2175 ml   Net -1815 ml       Physical Exam:     Physical Exam  Constitutional:       Appearance: He is obese  HENT:      Head: Normocephalic and atraumatic  Right Ear: External ear normal       Left Ear: External ear normal       Nose: Nose normal    Eyes:      Conjunctiva/sclera: Conjunctivae normal    Cardiovascular:      Heart sounds: Normal heart sounds  Pulmonary:      Effort: Pulmonary effort is normal       Breath sounds: No rhonchi or rales  Comments: On 6L NC    Abdominal:      General: Bowel sounds are normal    Genitourinary:     Comments: Turner catheter in place  Musculoskeletal:      Right lower leg: Edema present  Left lower leg: Edema present  Skin:     General: Skin is warm and dry  Neurological:      Mental Status: Mental status is at baseline     Psychiatric:         Mood and Affect: Mood normal          Additional Data:     Labs:    Results from last 7 days   Lab Units 21  0532   WBC Thousand/uL 7 70   HEMOGLOBIN g/dL 9 5*   HEMATOCRIT % 33 7* PLATELETS Thousands/uL 265   NEUTROS PCT % 71   LYMPHS PCT % 7*   MONOS PCT % 7   EOS PCT % 12*     Results from last 7 days   Lab Units 02/23/21  0532 02/21/21  0912   POTASSIUM mmol/L 3 0* 3 4*   CHLORIDE mmol/L 111* 112*   CO2 mmol/L 23 25   BUN mg/dL 31* 33*   CREATININE mg/dL 1 28 1 23   CALCIUM mg/dL 9 0 8 7   ALK PHOS U/L  --  71   ALT U/L  --  31   AST U/L  --  22           * I Have Reviewed All Lab Data Listed Above  * Additional Pertinent Lab Tests Reviewed:  All Labs Within Last 24 Hours Reviewed    Imaging:    Imaging Reports Reviewed Today Include: None  Imaging Personally Reviewed by Myself Includes:  As above    Recent Cultures (last 7 days):     Results from last 7 days   Lab Units 02/17/21  1007   C DIFF TOXIN B BY PCR  Negative       Last 24 Hours Medication List:   Current Facility-Administered Medications   Medication Dose Route Frequency Provider Last Rate    acetaminophen  650 mg Oral Q6H PRN Krystina Bowling, DO      apixaban  2 5 mg Oral BID Krystian Bowling, DO      aspirin  81 mg Oral Daily Krystian Bowling, DO      atorvastatin  40 mg Per NG Tube HS Krystian Bowling, DO      bumetanide  2 mg Oral Daily Natividad Quiñones MD      cholecalciferol  2,000 Units Per NG Tube Daily Krystian Bowling, DO      insulin lispro  1-5 Units Subcutaneous HS KATHY Gomez      insulin lispro  2-12 Units Subcutaneous TID AC KATHY Leonard      levETIRAcetam  750 mg Oral Q12H [address] [place] [address], DO      lidocaine  1 patch Topical Daily PRN Keeley Watkins MD      melatonin  6 mg Oral HS Krystian Bowling, DO      metoprolol tartrate  25 mg Oral Q12H [facility], [facility]      midodrine  10 mg Oral Q8H Krystian Bowling, DO      multivitamin-minerals  1 tablet Oral Daily Krystian Bowling, DO      omeprazole (PRILOSEC) suspension 2 mg/mL  20 mg Oral Q12H Krystian Bowling, DO      potassium chloride  40 mEq Oral TID With Meals Natividad Quiñones MD      potassium chloride  20 mEq Intravenous Q2H Chichi MD Gerson 20 mEq (02/23/21 0730)    sertraline  125 mg Oral Daily Betty Barillas MD          Today, Patient Was Seen By: Marilou Allen MD    ** Please Note: This note has been constructed using a voice recognition system   **

## 2021-02-23 NOTE — PROGRESS NOTES
Patient only agreeable to wearing oxygen if we give him sodas  Patient aware he is on a FR of 1500ml, however, he is non compliant  RT at bedside, patient currently agreeing to wear bipap, and now is agreeing after intially refusing to take his prn Tyelnol for left leg pain 10/10,as prn IV pain medication is not appropriate after discussion with MD  Will continue to monitor patient

## 2021-02-23 NOTE — CASE MANAGEMENT
MARCIA received a message from [facility] [phone], reporting no Lonita  is required for the Pt's return to [facility], as it is his place of residence

## 2021-02-24 LAB
ANION GAP SERPL CALCULATED.3IONS-SCNC: 9 MMOL/L (ref 4–13)
BASOPHILS # BLD AUTO: 0.07 THOUSANDS/ΜL (ref 0–0.1)
BASOPHILS NFR BLD AUTO: 1 % (ref 0–1)
BUN SERPL-MCNC: 30 MG/DL (ref 5–25)
CALCIUM SERPL-MCNC: 9 MG/DL (ref 8.3–10.1)
CHLORIDE SERPL-SCNC: 115 MMOL/L (ref 100–108)
CO2 SERPL-SCNC: 22 MMOL/L (ref 21–32)
CREAT SERPL-MCNC: 1.27 MG/DL (ref 0.6–1.3)
EOSINOPHIL # BLD AUTO: 0.82 THOUSAND/ΜL (ref 0–0.61)
EOSINOPHIL NFR BLD AUTO: 11 % (ref 0–6)
ERYTHROCYTE [DISTWIDTH] IN BLOOD BY AUTOMATED COUNT: 25.5 % (ref 11.6–15.1)
GFR SERPL CREATININE-BSD FRML MDRD: 61 ML/MIN/1.73SQ M
GLUCOSE SERPL-MCNC: 101 MG/DL (ref 65–140)
GLUCOSE SERPL-MCNC: 108 MG/DL (ref 65–140)
GLUCOSE SERPL-MCNC: 117 MG/DL (ref 65–140)
GLUCOSE SERPL-MCNC: 134 MG/DL (ref 65–140)
GLUCOSE SERPL-MCNC: 95 MG/DL (ref 65–140)
HCT VFR BLD AUTO: 33.1 % (ref 36.5–49.3)
HGB BLD-MCNC: 9.2 G/DL (ref 12–17)
IMM GRANULOCYTES # BLD AUTO: 0.16 THOUSAND/UL (ref 0–0.2)
IMM GRANULOCYTES NFR BLD AUTO: 2 % (ref 0–2)
LYMPHOCYTES # BLD AUTO: 0.66 THOUSANDS/ΜL (ref 0.6–4.47)
LYMPHOCYTES NFR BLD AUTO: 9 % (ref 14–44)
MCH RBC QN AUTO: 25.1 PG (ref 26.8–34.3)
MCHC RBC AUTO-ENTMCNC: 27.8 G/DL (ref 31.4–37.4)
MCV RBC AUTO: 90 FL (ref 82–98)
MONOCYTES # BLD AUTO: 0.64 THOUSAND/ΜL (ref 0.17–1.22)
MONOCYTES NFR BLD AUTO: 9 % (ref 4–12)
NEUTROPHILS # BLD AUTO: 5.21 THOUSANDS/ΜL (ref 1.85–7.62)
NEUTS SEG NFR BLD AUTO: 68 % (ref 43–75)
NRBC BLD AUTO-RTO: 0 /100 WBCS
PLATELET # BLD AUTO: 282 THOUSANDS/UL (ref 149–390)
PMV BLD AUTO: 10.6 FL (ref 8.9–12.7)
POTASSIUM SERPL-SCNC: 3.7 MMOL/L (ref 3.5–5.3)
RBC # BLD AUTO: 3.67 MILLION/UL (ref 3.88–5.62)
SODIUM SERPL-SCNC: 146 MMOL/L (ref 136–145)
WBC # BLD AUTO: 7.56 THOUSAND/UL (ref 4.31–10.16)

## 2021-02-24 PROCEDURE — 99231 SBSQ HOSP IP/OBS SF/LOW 25: CPT | Performed by: INTERNAL MEDICINE

## 2021-02-24 PROCEDURE — 80048 BASIC METABOLIC PNL TOTAL CA: CPT | Performed by: FAMILY MEDICINE

## 2021-02-24 PROCEDURE — 85025 COMPLETE CBC W/AUTO DIFF WBC: CPT | Performed by: FAMILY MEDICINE

## 2021-02-24 PROCEDURE — 94660 CPAP INITIATION&MGMT: CPT

## 2021-02-24 PROCEDURE — 82948 REAGENT STRIP/BLOOD GLUCOSE: CPT

## 2021-02-24 PROCEDURE — 94760 N-INVAS EAR/PLS OXIMETRY 1: CPT

## 2021-02-24 PROCEDURE — 94762 N-INVAS EAR/PLS OXIMTRY CONT: CPT

## 2021-02-24 RX ADMIN — MIDODRINE HYDROCHLORIDE 10 MG: 5 TABLET ORAL at 23:48

## 2021-02-24 RX ADMIN — BUMETANIDE 2 MG: 1 TABLET ORAL at 08:42

## 2021-02-24 RX ADMIN — MIDODRINE HYDROCHLORIDE 10 MG: 5 TABLET ORAL at 07:02

## 2021-02-24 RX ADMIN — SERTRALINE 125 MG: 25 TABLET, FILM COATED ORAL at 08:26

## 2021-02-24 RX ADMIN — Medication 20 MG: at 05:41

## 2021-02-24 RX ADMIN — LEVETIRACETAM 750 MG: 500 TABLET, FILM COATED ORAL at 08:27

## 2021-02-24 RX ADMIN — LEVETIRACETAM 750 MG: 500 TABLET, FILM COATED ORAL at 21:43

## 2021-02-24 RX ADMIN — Medication 2000 UNITS: at 08:27

## 2021-02-24 RX ADMIN — ASPIRIN 81 MG CHEWABLE TABLET 81 MG: 81 TABLET CHEWABLE at 08:27

## 2021-02-24 RX ADMIN — Medication 20 MG: at 21:59

## 2021-02-24 RX ADMIN — POTASSIUM CHLORIDE 40 MEQ: 1500 TABLET, EXTENDED RELEASE ORAL at 07:01

## 2021-02-24 RX ADMIN — Medication 1 TABLET: at 08:27

## 2021-02-24 RX ADMIN — Medication 6 MG: at 21:43

## 2021-02-24 RX ADMIN — POTASSIUM CHLORIDE 40 MEQ: 1500 TABLET, EXTENDED RELEASE ORAL at 13:37

## 2021-02-24 RX ADMIN — METOPROLOL TARTRATE 25 MG: 25 TABLET, FILM COATED ORAL at 08:26

## 2021-02-24 RX ADMIN — MIDODRINE HYDROCHLORIDE 10 MG: 5 TABLET ORAL at 17:11

## 2021-02-24 RX ADMIN — POTASSIUM CHLORIDE 40 MEQ: 1500 TABLET, EXTENDED RELEASE ORAL at 17:11

## 2021-02-24 RX ADMIN — APIXABAN 2.5 MG: 2.5 TABLET, FILM COATED ORAL at 08:27

## 2021-02-24 RX ADMIN — DESMOPRESSIN ACETATE 40 MG: 0.2 TABLET ORAL at 21:43

## 2021-02-24 RX ADMIN — APIXABAN 2.5 MG: 2.5 TABLET, FILM COATED ORAL at 17:11

## 2021-02-24 NOTE — PHYSICAL THERAPY NOTE
PHYSICAL THERAPY SCREEN NOTE    Patient Name: Zeb Ontiveros  DMZPN'E Date: 2/24/2021     PT re-evaluation orders received, chart review performed  Pt miri on 2/18/2021  At baseline, pt requires a elo lift for OOB and is dependent to roll in the bed  At this time, pt is at his functional mobility and has no skilled acute PT needs  Will DC these new orders  Please reconsult if needs arise      Agustín Amaro, PT, DPT

## 2021-02-24 NOTE — PLAN OF CARE
Problem: Prexisting or High Potential for Compromised Skin Integrity  Goal: Skin integrity is maintained or improved  Description: INTERVENTIONS:  - Identify patients at risk for skin breakdown  - Assess and monitor skin integrity  - Assess and monitor nutrition and hydration status  - Monitor labs   - Assess for incontinence   - Turn and reposition patient  - Assist with mobility/ambulation  - Relieve pressure over bony prominences  - Avoid friction and shearing  - Provide appropriate hygiene as needed including keeping skin clean and dry  - Evaluate need for skin moisturizer/barrier cream  - Collaborate with interdisciplinary team   - Patient/family teaching  - Consider wound care consult   Outcome: Progressing     Problem: Potential for Falls  Goal: Patient will remain free of falls  Description: INTERVENTIONS:  - Assess patient frequently for physical needs  -  Identify cognitive and physical deficits and behaviors that affect risk of falls  -  [place] fall precautions as indicated by assessment   - Educate patient/family on patient safety including physical limitations  - Instruct patient to call for assistance with activity based on assessment  - Modify environment to reduce risk of injury  - Consider OT/PT consult to assist with strengthening/mobility  Outcome: Progressing     Problem: Nutrition/Hydration-ADULT  Goal: Nutrient/Hydration intake appropriate for improving, restoring or maintaining nutritional needs  Description: Monitor and assess patient's nutrition/hydration status for malnutrition  Collaborate with interdisciplinary team and initiate plan and interventions as ordered  Monitor patient's weight and dietary intake as ordered or per policy  Utilize nutrition screening tool and intervene as necessary  Determine patient's food preferences and provide high-protein, high-caloric foods as appropriate       INTERVENTIONS:  - Monitor oral intake, urinary output, labs, and treatment plans  - Assess nutrition and hydration status and recommend course of action  - Evaluate amount of meals eaten  - Assist patient with eating if necessary   - Allow adequate time for meals  - Recommend/ encourage appropriate diets, oral nutritional supplements, and vitamin/mineral supplements  - Order, calculate, and assess calorie counts as needed  - Recommend, monitor, and adjust tube feedings and TPN/PPN based on assessed needs  - Assess need for intravenous fluids  - Provide specific nutrition/hydration education as appropriate  - Include patient/family/caregiver in decisions related to nutrition  Outcome: Progressing     Problem: NEUROSENSORY - ADULT  Goal: Achieves stable or improved neurological status  Description: INTERVENTIONS  - Monitor and report changes in neurological status  - Monitor vital signs such as temperature, blood pressure, glucose, and any other labs ordered   - Initiate measures to prevent increased intracranial pressure  - Monitor for seizure activity and implement precautions if appropriate      Outcome: Progressing  Goal: Achieves maximal functionality and self care  Description: INTERVENTIONS  - Monitor swallowing and airway patency with patient fatigue and changes in neurological status  - Encourage and assist patient to increase activity and self care     - Encourage visually impaired, hearing impaired and aphasic patients to use assistive/communication devices  Outcome: Progressing     Problem: CARDIOVASCULAR - ADULT  Goal: Maintains optimal cardiac output and hemodynamic stability  Description: INTERVENTIONS:  - Monitor I/O, vital signs and rhythm  - Monitor for S/S and trends of decreased cardiac output  - Administer and titrate ordered vasoactive medications to optimize hemodynamic stability  - Assess quality of pulses, skin color and temperature  - Assess for signs of decreased coronary artery perfusion  - Instruct patient to report change in severity of symptoms  Outcome: Progressing  Goal: Absence of cardiac dysrhythmias or at baseline rhythm  Description: INTERVENTIONS:  - Continuous cardiac monitoring, vital signs, obtain 12 lead EKG if ordered  - Administer antiarrhythmic and heart rate control medications as ordered  - Monitor electrolytes and administer replacement therapy as ordered  Outcome: Progressing     Problem: RESPIRATORY - ADULT  Goal: Achieves optimal ventilation and oxygenation  Description: INTERVENTIONS:  - Assess for changes in respiratory status  - Assess for changes in mentation and behavior  - Position to facilitate oxygenation and minimize respiratory effort  - Oxygen administered by appropriate delivery if ordered  - Initiate smoking cessation education as indicated  - Encourage broncho-pulmonary hygiene including cough, deep breathe, Incentive Spirometry  - Assess the need for suctioning and aspirate as needed  - Assess and instruct to report SOB or any respiratory difficulty  - Respiratory Therapy support as indicated  Outcome: Progressing     Problem: GENITOURINARY - ADULT  Goal: Maintains or returns to baseline urinary function  Description: INTERVENTIONS:  - Assess urinary function  - Encourage oral fluids to ensure adequate hydration if ordered  - Administer IV fluids as ordered to ensure adequate hydration  - Administer ordered medications as needed  - Offer frequent toileting  - Follow urinary retention protocol if ordered  Outcome: Progressing  Goal: Absence of urinary retention  Description: INTERVENTIONS:  - Assess patients ability to void and empty bladder  - Monitor I/O  - Bladder scan as needed  - Discuss with physician/AP medications to alleviate retention as needed  - Discuss catheterization for long term situations as appropriate  Outcome: Progressing  Goal: Urinary catheter remains patent  Description: INTERVENTIONS:  - Assess patency of urinary catheter  - If patient has a chronic bo, consider changing catheter if non-functioning  - Follow guidelines for intermittent irrigation of non-functioning urinary catheter  Outcome: Progressing     Problem: METABOLIC, FLUID AND ELECTROLYTES - ADULT  Goal: Electrolytes maintained within normal limits  Description: INTERVENTIONS:  - Monitor labs and assess patient for signs and symptoms of electrolyte imbalances  - Administer electrolyte replacement as ordered  - Monitor response to electrolyte replacements, including repeat lab results as appropriate  - Instruct patient on fluid and nutrition as appropriate  Outcome: Progressing  Goal: Fluid balance maintained  Description: INTERVENTIONS:  - Monitor labs   - Monitor I/O and WT  - Instruct patient on fluid and nutrition as appropriate  - Assess for signs & symptoms of volume excess or deficit  Outcome: Progressing  Goal: Glucose maintained within target range  Description: INTERVENTIONS:  - Monitor Blood Glucose as ordered  - Assess for signs and symptoms of hyperglycemia and hypoglycemia  - Administer ordered medications to maintain glucose within target range  - Assess nutritional intake and initiate nutrition service referral as needed  Outcome: Progressing     Problem: MUSCULOSKELETAL - ADULT  Goal: Maintain or return mobility to safest level of function  Description: INTERVENTIONS:  - Assess patient's ability to carry out ADLs; assess patient's baseline for ADL function and identify physical deficits which impact ability to perform ADLs (bathing, care of mouth/teeth, toileting, grooming, dressing, etc )  - Assess/evaluate cause of self-care deficits   - Assess range of motion  - Assess patient's mobility  - Assess patient's need for assistive devices and provide as appropriate  - Encourage maximum independence but intervene and supervise when necessary  - Involve family in performance of ADLs  - Assess for home care needs following discharge   - Consider OT consult to assist with ADL evaluation and planning for discharge  - Provide patient education as appropriate  Outcome: Progressing  Goal: Maintain proper alignment of affected body part  Description: INTERVENTIONS:  - Support, maintain and protect limb and body alignment  - Provide patient/ family with appropriate education  Outcome: Progressing     Problem: INFECTION - ADULT  Goal: Absence or prevention of progression during hospitalization  Description: INTERVENTIONS:  - Assess and monitor for signs and symptoms of infection  - Monitor lab/diagnostic results  - Monitor all insertion sites, i e  indwelling lines, tubes, and drains  - Monitor endotracheal if appropriate and nasal secretions for changes in amount and color  - [place] appropriate cooling/warming therapies per order  - Administer medications as ordered  - Instruct and encourage patient and family to use good hand hygiene technique  - Identify and instruct in appropriate isolation precautions for identified infection/condition  Outcome: Progressing     Problem: DISCHARGE PLANNING  Goal: Discharge to home or other facility with appropriate resources  Description: INTERVENTIONS:  - Identify barriers to discharge w/patient and caregiver  - Arrange for needed discharge resources and transportation as appropriate  - Identify discharge learning needs (meds, wound care, etc )  - Arrange for interpretive services to assist at discharge as needed  - Refer to Case Management Department for coordinating discharge planning if the patient needs post-hospital services based on physician/advanced practitioner order or complex needs related to functional status, cognitive ability, or social support system  Outcome: Progressing     Problem: Knowledge Deficit  Goal: Patient/family/caregiver demonstrates understanding of disease process, treatment plan, medications, and discharge instructions  Description: Complete learning assessment and assess knowledge base    Interventions:  - Provide teaching at level of understanding  - Provide teaching via preferred learning methods  Outcome: Progressing     Problem: SAFETY,RESTRAINT: NV/NON-SELF DESTRUCTIVE BEHAVIOR  Goal: Remains free of harm/injury (restraint for non violent/non self-detsructive behavior)  Description: INTERVENTIONS:  - Instruct patient/family regarding restraint use   - Assess and monitor physiologic and psychological status   - Provide interventions and comfort measures to meet assessed patient needs   - Identify and implement measures to help patient regain control  - Assess readiness for release of restraint   Outcome: Progressing  Goal: Returns to optimal restraint-free functioning  Description: INTERVENTIONS:  - Assess the patient's behavior and symptoms that indicate continued need for restraint  - Identify and implement measures to help patient regain control  - Assess readiness for release of restraint   Outcome: Progressing

## 2021-02-25 LAB
ANION GAP SERPL CALCULATED.3IONS-SCNC: 9 MMOL/L (ref 4–13)
ANISOCYTOSIS BLD QL SMEAR: PRESENT
BASOPHILS # BLD MANUAL: 0.07 THOUSAND/UL (ref 0–0.1)
BASOPHILS NFR MAR MANUAL: 1 % (ref 0–1)
BUN SERPL-MCNC: 28 MG/DL (ref 5–25)
CALCIUM SERPL-MCNC: 9.1 MG/DL (ref 8.3–10.1)
CHLORIDE SERPL-SCNC: 112 MMOL/L (ref 100–108)
CO2 SERPL-SCNC: 22 MMOL/L (ref 21–32)
CREAT SERPL-MCNC: 1.23 MG/DL (ref 0.6–1.3)
EOSINOPHIL # BLD MANUAL: 0.67 THOUSAND/UL (ref 0–0.4)
EOSINOPHIL NFR BLD MANUAL: 9 % (ref 0–6)
ERYTHROCYTE [DISTWIDTH] IN BLOOD BY AUTOMATED COUNT: 25.3 % (ref 11.6–15.1)
GFR SERPL CREATININE-BSD FRML MDRD: 63 ML/MIN/1.73SQ M
GLUCOSE SERPL-MCNC: 110 MG/DL (ref 65–140)
GLUCOSE SERPL-MCNC: 113 MG/DL (ref 65–140)
GLUCOSE SERPL-MCNC: 113 MG/DL (ref 65–140)
GLUCOSE SERPL-MCNC: 120 MG/DL (ref 65–140)
GLUCOSE SERPL-MCNC: 124 MG/DL (ref 65–140)
GLUCOSE SERPL-MCNC: 144 MG/DL (ref 65–140)
HCT VFR BLD AUTO: 34.5 % (ref 36.5–49.3)
HGB BLD-MCNC: 9.3 G/DL (ref 12–17)
LYMPHOCYTES # BLD AUTO: 0.3 THOUSAND/UL (ref 0.6–4.47)
LYMPHOCYTES # BLD AUTO: 4 % (ref 14–44)
MCH RBC QN AUTO: 25.2 PG (ref 26.8–34.3)
MCHC RBC AUTO-ENTMCNC: 27 G/DL (ref 31.4–37.4)
MCV RBC AUTO: 94 FL (ref 82–98)
METAMYELOCYTES NFR BLD MANUAL: 2 % (ref 0–1)
MONOCYTES # BLD AUTO: 0.22 THOUSAND/UL (ref 0–1.22)
MONOCYTES NFR BLD: 3 % (ref 4–12)
MYELOCYTES NFR BLD MANUAL: 1 % (ref 0–1)
NEUTROPHILS # BLD MANUAL: 5.91 THOUSAND/UL (ref 1.85–7.62)
NEUTS BAND NFR BLD MANUAL: 6 % (ref 0–8)
NEUTS SEG NFR BLD AUTO: 74 % (ref 43–75)
NRBC BLD AUTO-RTO: 0 /100 WBCS
OVALOCYTES BLD QL SMEAR: PRESENT
PLATELET # BLD AUTO: 325 THOUSANDS/UL (ref 149–390)
PLATELET BLD QL SMEAR: ADEQUATE
PMV BLD AUTO: 11.3 FL (ref 8.9–12.7)
POIKILOCYTOSIS BLD QL SMEAR: PRESENT
POLYCHROMASIA BLD QL SMEAR: PRESENT
POTASSIUM SERPL-SCNC: 3.8 MMOL/L (ref 3.5–5.3)
RBC # BLD AUTO: 3.69 MILLION/UL (ref 3.88–5.62)
SODIUM SERPL-SCNC: 143 MMOL/L (ref 136–145)
TOTAL CELLS COUNTED SPEC: 100
WBC # BLD AUTO: 7.39 THOUSAND/UL (ref 4.31–10.16)

## 2021-02-25 PROCEDURE — 80048 BASIC METABOLIC PNL TOTAL CA: CPT | Performed by: FAMILY MEDICINE

## 2021-02-25 PROCEDURE — 85007 BL SMEAR W/DIFF WBC COUNT: CPT | Performed by: FAMILY MEDICINE

## 2021-02-25 PROCEDURE — 99231 SBSQ HOSP IP/OBS SF/LOW 25: CPT | Performed by: INTERNAL MEDICINE

## 2021-02-25 PROCEDURE — 82948 REAGENT STRIP/BLOOD GLUCOSE: CPT

## 2021-02-25 PROCEDURE — 85027 COMPLETE CBC AUTOMATED: CPT | Performed by: FAMILY MEDICINE

## 2021-02-25 PROCEDURE — 94762 N-INVAS EAR/PLS OXIMTRY CONT: CPT

## 2021-02-25 PROCEDURE — 94760 N-INVAS EAR/PLS OXIMETRY 1: CPT

## 2021-02-25 RX ADMIN — APIXABAN 2.5 MG: 2.5 TABLET, FILM COATED ORAL at 18:04

## 2021-02-25 RX ADMIN — MIDODRINE HYDROCHLORIDE 10 MG: 5 TABLET ORAL at 05:38

## 2021-02-25 RX ADMIN — BUMETANIDE 2 MG: 1 TABLET ORAL at 09:29

## 2021-02-25 RX ADMIN — DESMOPRESSIN ACETATE 40 MG: 0.2 TABLET ORAL at 21:36

## 2021-02-25 RX ADMIN — Medication 1 TABLET: at 09:29

## 2021-02-25 RX ADMIN — METOPROLOL TARTRATE 25 MG: 25 TABLET, FILM COATED ORAL at 21:38

## 2021-02-25 RX ADMIN — SERTRALINE 125 MG: 25 TABLET, FILM COATED ORAL at 09:29

## 2021-02-25 RX ADMIN — POTASSIUM CHLORIDE 40 MEQ: 1500 TABLET, EXTENDED RELEASE ORAL at 12:41

## 2021-02-25 RX ADMIN — Medication 6 MG: at 21:36

## 2021-02-25 RX ADMIN — ASPIRIN 81 MG CHEWABLE TABLET 81 MG: 81 TABLET CHEWABLE at 09:29

## 2021-02-25 RX ADMIN — APIXABAN 2.5 MG: 2.5 TABLET, FILM COATED ORAL at 09:29

## 2021-02-25 RX ADMIN — MIDODRINE HYDROCHLORIDE 10 MG: 5 TABLET ORAL at 16:22

## 2021-02-25 RX ADMIN — Medication 2000 UNITS: at 09:28

## 2021-02-25 RX ADMIN — Medication 20 MG: at 18:04

## 2021-02-25 RX ADMIN — Medication 20 MG: at 04:43

## 2021-02-25 RX ADMIN — METOPROLOL TARTRATE 25 MG: 25 TABLET, FILM COATED ORAL at 09:29

## 2021-02-25 RX ADMIN — MIDODRINE HYDROCHLORIDE 10 MG: 5 TABLET ORAL at 22:42

## 2021-02-25 RX ADMIN — LEVETIRACETAM 750 MG: 500 TABLET, FILM COATED ORAL at 09:28

## 2021-02-25 RX ADMIN — LEVETIRACETAM 750 MG: 500 TABLET, FILM COATED ORAL at 21:44

## 2021-02-25 RX ADMIN — POTASSIUM CHLORIDE 40 MEQ: 1500 TABLET, EXTENDED RELEASE ORAL at 16:22

## 2021-02-25 RX ADMIN — POTASSIUM CHLORIDE 40 MEQ: 1500 TABLET, EXTENDED RELEASE ORAL at 09:28

## 2021-02-25 NOTE — PLAN OF CARE
Problem: Prexisting or High Potential for Compromised Skin Integrity  Goal: Skin integrity is maintained or improved  Description: INTERVENTIONS:  - Identify patients at risk for skin breakdown  - Assess and monitor skin integrity  - Assess and monitor nutrition and hydration status  - Monitor labs   - Assess for incontinence   - Turn and reposition patient  - Assist with mobility/ambulation  - Relieve pressure over bony prominences  - Avoid friction and shearing  - Provide appropriate hygiene as needed including keeping skin clean and dry  - Evaluate need for skin moisturizer/barrier cream  - Collaborate with interdisciplinary team   - Patient/family teaching  - Consider wound care consult   Outcome: Progressing     Problem: Potential for Falls  Goal: Patient will remain free of falls  Description: INTERVENTIONS:  - Assess patient frequently for physical needs  -  Identify cognitive and physical deficits and behaviors that affect risk of falls  -  [place] fall precautions as indicated by assessment   - Educate patient/family on patient safety including physical limitations  - Instruct patient to call for assistance with activity based on assessment  - Modify environment to reduce risk of injury  - Consider OT/PT consult to assist with strengthening/mobility  Outcome: Progressing     Problem: Nutrition/Hydration-ADULT  Goal: Nutrient/Hydration intake appropriate for improving, restoring or maintaining nutritional needs  Description: Monitor and assess patient's nutrition/hydration status for malnutrition  Collaborate with interdisciplinary team and initiate plan and interventions as ordered  Monitor patient's weight and dietary intake as ordered or per policy  Utilize nutrition screening tool and intervene as necessary  Determine patient's food preferences and provide high-protein, high-caloric foods as appropriate       INTERVENTIONS:  - Monitor oral intake, urinary output, labs, and treatment plans  - Assess nutrition and hydration status and recommend course of action  - Evaluate amount of meals eaten  - Assist patient with eating if necessary   - Allow adequate time for meals  - Recommend/ encourage appropriate diets, oral nutritional supplements, and vitamin/mineral supplements  - Order, calculate, and assess calorie counts as needed  - Recommend, monitor, and adjust tube feedings and TPN/PPN based on assessed needs  - Assess need for intravenous fluids  - Provide specific nutrition/hydration education as appropriate  - Include patient/family/caregiver in decisions related to nutrition  Outcome: Progressing     Problem: NEUROSENSORY - ADULT  Goal: Achieves stable or improved neurological status  Description: INTERVENTIONS  - Monitor and report changes in neurological status  - Monitor vital signs such as temperature, blood pressure, glucose, and any other labs ordered   - Initiate measures to prevent increased intracranial pressure  - Monitor for seizure activity and implement precautions if appropriate      Outcome: Progressing  Goal: Achieves maximal functionality and self care  Description: INTERVENTIONS  - Monitor swallowing and airway patency with patient fatigue and changes in neurological status  - Encourage and assist patient to increase activity and self care     - Encourage visually impaired, hearing impaired and aphasic patients to use assistive/communication devices  Outcome: Progressing     Problem: CARDIOVASCULAR - ADULT  Goal: Maintains optimal cardiac output and hemodynamic stability  Description: INTERVENTIONS:  - Monitor I/O, vital signs and rhythm  - Monitor for S/S and trends of decreased cardiac output  - Administer and titrate ordered vasoactive medications to optimize hemodynamic stability  - Assess quality of pulses, skin color and temperature  - Assess for signs of decreased coronary artery perfusion  - Instruct patient to report change in severity of symptoms  Outcome: Progressing  Goal: Absence of cardiac dysrhythmias or at baseline rhythm  Description: INTERVENTIONS:  - Continuous cardiac monitoring, vital signs, obtain 12 lead EKG if ordered  - Administer antiarrhythmic and heart rate control medications as ordered  - Monitor electrolytes and administer replacement therapy as ordered  Outcome: Progressing     Problem: RESPIRATORY - ADULT  Goal: Achieves optimal ventilation and oxygenation  Description: INTERVENTIONS:  - Assess for changes in respiratory status  - Assess for changes in mentation and behavior  - Position to facilitate oxygenation and minimize respiratory effort  - Oxygen administered by appropriate delivery if ordered  - Initiate smoking cessation education as indicated  - Encourage broncho-pulmonary hygiene including cough, deep breathe, Incentive Spirometry  - Assess the need for suctioning and aspirate as needed  - Assess and instruct to report SOB or any respiratory difficulty  - Respiratory Therapy support as indicated  Outcome: Progressing     Problem: GENITOURINARY - ADULT  Goal: Maintains or returns to baseline urinary function  Description: INTERVENTIONS:  - Assess urinary function  - Encourage oral fluids to ensure adequate hydration if ordered  - Administer IV fluids as ordered to ensure adequate hydration  - Administer ordered medications as needed  - Offer frequent toileting  - Follow urinary retention protocol if ordered  Outcome: Progressing  Goal: Absence of urinary retention  Description: INTERVENTIONS:  - Assess patients ability to void and empty bladder  - Monitor I/O  - Bladder scan as needed  - Discuss with physician/AP medications to alleviate retention as needed  - Discuss catheterization for long term situations as appropriate  Outcome: Progressing  Goal: Urinary catheter remains patent  Description: INTERVENTIONS:  - Assess patency of urinary catheter  - If patient has a chronic bo, consider changing catheter if non-functioning  - Follow guidelines for intermittent irrigation of non-functioning urinary catheter  Outcome: Progressing     Problem: METABOLIC, FLUID AND ELECTROLYTES - ADULT  Goal: Electrolytes maintained within normal limits  Description: INTERVENTIONS:  - Monitor labs and assess patient for signs and symptoms of electrolyte imbalances  - Administer electrolyte replacement as ordered  - Monitor response to electrolyte replacements, including repeat lab results as appropriate  - Instruct patient on fluid and nutrition as appropriate  Outcome: Progressing  Goal: Fluid balance maintained  Description: INTERVENTIONS:  - Monitor labs   - Monitor I/O and WT  - Instruct patient on fluid and nutrition as appropriate  - Assess for signs & symptoms of volume excess or deficit  Outcome: Progressing  Goal: Glucose maintained within target range  Description: INTERVENTIONS:  - Monitor Blood Glucose as ordered  - Assess for signs and symptoms of hyperglycemia and hypoglycemia  - Administer ordered medications to maintain glucose within target range  - Assess nutritional intake and initiate nutrition service referral as needed  Outcome: Progressing     Problem: MUSCULOSKELETAL - ADULT  Goal: Maintain or return mobility to safest level of function  Description: INTERVENTIONS:  - Assess patient's ability to carry out ADLs; assess patient's baseline for ADL function and identify physical deficits which impact ability to perform ADLs (bathing, care of mouth/teeth, toileting, grooming, dressing, etc )  - Assess/evaluate cause of self-care deficits   - Assess range of motion  - Assess patient's mobility  - Assess patient's need for assistive devices and provide as appropriate  - Encourage maximum independence but intervene and supervise when necessary  - Involve family in performance of ADLs  - Assess for home care needs following discharge   - Consider OT consult to assist with ADL evaluation and planning for discharge  - Provide patient education as appropriate  Outcome: Progressing  Goal: Maintain proper alignment of affected body part  Description: INTERVENTIONS:  - Support, maintain and protect limb and body alignment  - Provide patient/ family with appropriate education  Outcome: Progressing     Problem: INFECTION - ADULT  Goal: Absence or prevention of progression during hospitalization  Description: INTERVENTIONS:  - Assess and monitor for signs and symptoms of infection  - Monitor lab/diagnostic results  - Monitor all insertion sites, i e  indwelling lines, tubes, and drains  - Monitor endotracheal if appropriate and nasal secretions for changes in amount and color  - [place] appropriate cooling/warming therapies per order  - Administer medications as ordered  - Instruct and encourage patient and family to use good hand hygiene technique  - Identify and instruct in appropriate isolation precautions for identified infection/condition  Outcome: Progressing     Problem: DISCHARGE PLANNING  Goal: Discharge to home or other facility with appropriate resources  Description: INTERVENTIONS:  - Identify barriers to discharge w/patient and caregiver  - Arrange for needed discharge resources and transportation as appropriate  - Identify discharge learning needs (meds, wound care, etc )  - Arrange for interpretive services to assist at discharge as needed  - Refer to Case Management Department for coordinating discharge planning if the patient needs post-hospital services based on physician/advanced practitioner order or complex needs related to functional status, cognitive ability, or social support system  Outcome: Progressing     Problem: Knowledge Deficit  Goal: Patient/family/caregiver demonstrates understanding of disease process, treatment plan, medications, and discharge instructions  Description: Complete learning assessment and assess knowledge base    Interventions:  - Provide teaching at level of understanding  - Provide teaching via preferred learning methods  Outcome: Progressing     Problem: SAFETY,RESTRAINT: NV/NON-SELF DESTRUCTIVE BEHAVIOR  Goal: Remains free of harm/injury (restraint for non violent/non self-detsructive behavior)  Description: INTERVENTIONS:  - Instruct patient/family regarding restraint use   - Assess and monitor physiologic and psychological status   - Provide interventions and comfort measures to meet assessed patient needs   - Identify and implement measures to help patient regain control  - Assess readiness for release of restraint   Outcome: Progressing  Goal: Returns to optimal restraint-free functioning  Description: INTERVENTIONS:  - Assess the patient's behavior and symptoms that indicate continued need for restraint  - Identify and implement measures to help patient regain control  - Assess readiness for release of restraint   Outcome: Progressing

## 2021-02-25 NOTE — PROGRESS NOTES
Progress Note - Emily Gannon 1960, 63 y o  male MRN: [id number]    Unit/Bed#: S -01 Encounter: [id number]    Primary Care Provider: Joo Jacqueline, DO   Date and time admitted to hospital: 1/17/2021  4:16 PM        COVID-19  Assessment & Plan  Patient confirmed [id number] positive 01/16/2021  Initially presented to [facility] with respiratory distress and altered mental status  While at [facility], was intubated due to hypercapnia and hypoxia  He became hypotensive requiring pressor therapy and was transferred to [address] [place] [address] for further critical care management of severe COVID-19 infection  Cam from [facility] intubated on 01/17 and was subsequently extubated on 01/18  Plan  ·  Patient was severe COVID treatment pathway  · Actemra given 1/28  · S/p Conv Plasma 1/17  · Patient was considered a candidate for remdesivir  · ESBL and E coli pneumonia superimposed on COVID-19 pneumonia Ertapenum completed a 10 day   · Completed 31 days of Decadron on 02/15/2021                 Acute kidney injury superimposed on CKD [facility])  Assessment & Plan  POA, likely multifactorial 2/2 ATN in setting of COVID-19 + contrast induced nephropathy + vanco toxicity  Baseline creatinine 1 0-1 3    Plan    · Turner catheter in place  · Monitor output, -1 7L/ 24 hours  · Monitor I/Os  · Currently on Bumex 2 mg twice daily  · Monitor renal indices    History of stroke  Assessment & Plan  History of stroke 1 year ago  Now baseline resident at [facility]  Plan  · Continue Eliquis daily  · Continue ASA daily         Seizure [facility])  Assessment & Plan  Patient does not have a seizure history but does have history of CVA in the past  On 02/01 patient had a witnessed tonic clonic seizure episode and Neurology was consulted      Plan  · Continue on Keppra 750mg BID  · EEG 2/2/21 Background activities are too slow suggesting moderate diffuse cerebral dysfunction of nonspecific etiology  · Seizure precautions  · 2/1/2021-2/16/2021- intubated    Hypernatremia  Assessment & Plan  Patient previously being followed by Nephrology, was on D5W which was d/c as of 2/17  Plan  · Na remains elevated but stable  · Will continue to monitor with BMP      Anemia  Assessment & Plan  · No active bleeding  · Trend CBC daily   · hgb stable    · Transfuse for Hgb < 7 0     Atrial fibrillation (HCC)  Assessment & Plan  · Continue eliquis 2 5mg BID  · Holding cardizem secondary to hypotension  · Continue metoprolol 25mg BID    Depression  Assessment & Plan  · Continue sertraline 125 mg   · Per psych eval-patient is not suicidal, does not require 1:1  · Required to return to nursing facility    Morbid obesity   Assessment & Plan  · Continue work with PT/OT  · Recommend therapeutic lifestyle changes to promote weight loss    Dysphagia  Assessment & Plan  · Tolerating puree and nectar thick liquids by straw w/o overt dysphagia  · Dysphagia-2 diet with NT per speech pathology    History of aortic valve replacement with bioprosthetic valve  Assessment & Plan  · Echo 1/2021 - EF 55%, bioprosthetic AV with gradient of 16  · Murmur present at baseline  · Resume home cardizem    Hypokalemia  Assessment & Plan  Hypokalemia this am at 3 0    Plan  · Continue Kdur 40mg BID  · Continue to replace PRN  · Monitor with BMP    Hyperlipidemia  Assessment & Plan  · Continue statin    Essential hypertension  Assessment & Plan  · Hold home cardizem in setting of hypotension  · Continue metoprolol 25mg BID for rate control  · BP's stable on Midodrine TID          VTE Pharmacologic Prophylaxis:   Pharmacologic: Apixaban (Eliquis)  Mechanical VTE Prophylaxis in Place: Yes    Discussions with Specialists or Other Care Team Provider: Psych, Speech pathology    Education and Discussions with Family / Patient:  Will update family    Current Length of Stay: 45 day(s)    Current Patient Status: Inpatient     Discharge Plan / Estimated Discharge Date: Patient was scheduled to discharge earlier in the week as he was deemed medically stable  However, per CM patient barriers to return to home facility and further review of his record is required as he was noted to be a target patient per facility  Code Status: Level 1 - Full Code      Subjective:   Patient rest comfortably in bed this afternoon on evaluation  Objective:     Vitals:   Temp (24hrs), Av °F (36 7 °C), Min:97 6 °F (36 4 °C), Max:98 5 °F (36 9 °C)    Temp:  [97 6 °F (36 4 °C)-98 5 °F (36 9 °C)] 98 °F (36 7 °C)  HR:  [50-92] 85  Resp:  [18] 18  BP: ()/(54-68) 100/54  SpO2:  [93 %-98 %] 96 %  Body mass index is 48 64 kg/m²  Input and Output Summary (last 24 hours): Intake/Output Summary (Last 24 hours) at 2021  Last data filed at 2021  Gross per 24 hour   Intake 1068 ml   Output 1900 ml   Net -832 ml       Physical Exam:     Physical Exam  Constitutional:       Appearance: He is obese  HENT:      Head: Normocephalic and atraumatic  Right Ear: External ear normal       Left Ear: External ear normal       Nose: Nose normal    Eyes:      Conjunctiva/sclera: Conjunctivae normal    Cardiovascular:      Heart sounds: Normal heart sounds  Pulmonary:      Effort: Pulmonary effort is normal       Breath sounds: No rhonchi or rales  Comments: On 6L NC    Abdominal:      General: Bowel sounds are normal    Genitourinary:     Comments: Turner catheter in place  Musculoskeletal:      Right lower leg: Edema present  Left lower leg: Edema present  Skin:     General: Skin is warm and dry  Neurological:      Mental Status: Mental status is at baseline     Psychiatric:         Mood and Affect: Mood normal          Additional Data:     Labs:    Results from last 7 days   Lab Units 21  0606   WBC Thousand/uL 7 56   HEMOGLOBIN g/dL 9 2*   HEMATOCRIT % 33 1*   PLATELETS Thousands/uL 282   NEUTROS PCT % 68   LYMPHS PCT % 9*   MONOS PCT % 9   EOS PCT % 11* Results from last 7 days   Lab Units 02/24/21  0606  02/21/21  0912   POTASSIUM mmol/L 3 7   < > 3 4*   CHLORIDE mmol/L 115*   < > 112*   CO2 mmol/L 22   < > 25   BUN mg/dL 30*   < > 33*   CREATININE mg/dL 1 27   < > 1 23   CALCIUM mg/dL 9 0   < > 8 7   ALK PHOS U/L  --   --  71   ALT U/L  --   --  31   AST U/L  --   --  22    < > = values in this interval not displayed  * I Have Reviewed All Lab Data Listed Above  * Additional Pertinent Lab Tests Reviewed:  All Labs Within Last 24 Hours Reviewed    Imaging:    Imaging Reports Reviewed Today Include: None  Imaging Personally Reviewed by Myself Includes:  As above    Recent Cultures (last 7 days):           Last 24 Hours Medication List:   Current Facility-Administered Medications   Medication Dose Route Frequency Provider Last Rate    acetaminophen  650 mg Oral Q6H PRN [facility], DO      apixaban  2 5 mg Oral BID [facility], DO      aspirin  81 mg Oral Daily [facility], DO      atorvastatin  40 mg Per NG Tube HS [facility], DO      bumetanide  2 mg Oral Daily Mirza Coburn MD      cholecalciferol  2,000 Units Per NG Tube Daily [facility], DO      insulin lispro  1-5 Units Subcutaneous [facility]      insulin lispro  2-12 Units Subcutaneous TID  KATHY Leonard      levETIRAcetam  750 mg Oral Q12H [address] [place] [address], DO      lidocaine  1 patch Topical Daily PRN Edna Mcclellan MD      melatonin  6 mg Oral HS [facility], DO      metoprolol tartrate  25 mg Oral Q12H [address] [place] [address], DO      midodrine  10 mg Oral Q8H [facility], DO      multivitamin-minerals  1 tablet Oral Daily [facility], DO      omeprazole (PRILOSEC) suspension 2 mg/mL  20 mg Oral Q12H [facility], DO      potassium chloride  40 mEq Oral TID With Meals Mirza Coburn MD      sertraline  125 mg Oral Daily Todd Choi MD          Today, Patient Was Seen By: Dimple Jarrett MD    ** Please Note: This note has been constructed using a voice recognition system   **

## 2021-02-25 NOTE — PROGRESS NOTES
Progress Note - Phil Horne 1960, 63 y o  male MRN: [id number]    Unit/Bed#: S -01 Encounter: [id number]    Primary Care Provider: María Torres DO   Date and time admitted to hospital: 1/17/2021  4:16 PM        COVID-19  Assessment & Plan  Patient confirmed [id number] positive 01/16/2021  Initially presented to [facility] [facility] with respiratory distress and altered mental status  While at [facility], was intubated due to hypercapnia and hypoxia  He became hypotensive requiring pressor therapy and was transferred to [facility] for further critical care management of severe COVID-19 infection  Cam from 's intubated on 01/17 and was subsequently extubated on 01/18  Plan  ·  Patient was severe COVID treatment pathway  · Actemra given 1/28  · S/p Conv Plasma 1/17  · Patient was considered a candidate for remdesivir  · ESBL and E coli pneumonia superimposed on COVID-19 pneumonia Ertapenum completed a 10 day   · Completed 31 days of Decadron on 02/15/2021                 Acute kidney injury superimposed on CKD [facility])  Assessment & Plan  POA, likely multifactorial 2/2 ATN in setting of COVID-19 + contrast induced nephropathy + vanco toxicity  Baseline creatinine 1 0-1 3    Plan    · Turner catheter in place  · Monitor output, -1 7L/ 24 hours  · Monitor I/Os  · Currently on Bumex 2 mg twice daily  · Monitor renal indices    History of stroke  Assessment & Plan  History of stroke 1 year ago  Now baseline resident at [facility]  Plan  · Continue Eliquis daily  · Continue ASA daily         Seizure [facility])  Assessment & Plan  Patient does not have a seizure history but does have history of CVA in the past  On 02/01 patient had a witnessed tonic clonic seizure episode and Neurology was consulted      Plan  · Continue on Keppra 750mg BID  · EEG 2/2/21 Background activities are too slow suggesting moderate diffuse cerebral dysfunction of nonspecific etiology  · Seizure precautions  · 2/1/2021-2/16/2021- intubated    Hypernatremia  Assessment & Plan  Patient previously being followed by Nephrology, was on D5W which was d/c as of 2/17  Plan  · Na remains elevated but stable  · Will continue to monitor with BMP      Anemia  Assessment & Plan  · No active bleeding  · Trend CBC daily   · hgb stable    · Transfuse for Hgb < 7 0     Atrial fibrillation (HCC)  Assessment & Plan  · Continue eliquis 2 5mg BID  · Holding cardizem secondary to hypotension  · Continue metoprolol 25mg BID    Depression  Assessment & Plan  · Continue sertraline 125 mg   · Per psych eval-patient is not suicidal, does not require 1:1  · Required to return to nursing facility    Morbid obesity   Assessment & Plan  · Continue work with PT/OT  · Recommend therapeutic lifestyle changes to promote weight loss    Dysphagia  Assessment & Plan  · Tolerating puree and nectar thick liquids by straw w/o overt dysphagia  · Dysphagia-2 diet with NT per speech pathology    History of aortic valve replacement with bioprosthetic valve  Assessment & Plan  · Echo 1/2021 - EF 55%, bioprosthetic AV with gradient of 16  · Murmur present at baseline  · Resume home cardizem    Hypokalemia  Assessment & Plan  Hypokalemia this am at 3 0    Plan  · Continue Kdur 40mg BID  · Continue to replace PRN  · Monitor with BMP    Hyperlipidemia  Assessment & Plan  · Continue statin    Essential hypertension  Assessment & Plan  · Hold home cardizem in setting of hypotension  · Continue metoprolol 25mg BID for rate control  · BP's stable on Midodrine TID          VTE Pharmacologic Prophylaxis:   Pharmacologic: Apixaban (Eliquis)  Mechanical VTE Prophylaxis in Place: Yes    Discussions with Specialists or Other Care Team Provider: Psych, Speech pathology  CM management following closely, per CM patient has been optioned and will require county to come and assess him      Education and Discussions with Family / Patient: As per chart called nursing home before calling wife  No answer and no option for VM  Called wife at listed number (home/mobile) no answer, no VM option  Current Length of Stay: 39 day(s)    Current Patient Status: Inpatient     Discharge Plan / Estimated Discharge Date: TBD- medically stable awaiting placement    Code Status: Level 1 - Full Code      Subjective:   Patient laying in bed comfortably  Per nursing, patient refusing to wear BiPaP overnight  Discussed with patient the importance of using his BiPAP agreed to try it overnight  Objective:     Vitals:   Temp (24hrs), Av 8 °F (36 6 °C), Min:97 6 °F (36 4 °C), Max:98 °F (36 7 °C)    Temp:  [97 6 °F (36 4 °C)-98 °F (36 7 °C)] 97 9 °F (36 6 °C)  HR:  [85-98] 98  Resp:  [18] 18  BP: (100-138)/(54-78) 138/78  SpO2:  [94 %-97 %] 96 %  Body mass index is 50 21 kg/m²  Input and Output Summary (last 24 hours): Intake/Output Summary (Last 24 hours) at 2021 1420  Last data filed at 2021 1412  Gross per 24 hour   Intake 698 ml   Output 1475 ml   Net -777 ml       Physical Exam:     Physical Exam  Constitutional:       Appearance: He is obese  HENT:      Head: Normocephalic and atraumatic  Right Ear: External ear normal       Left Ear: External ear normal       Nose: Nose normal    Eyes:      Conjunctiva/sclera: Conjunctivae normal    Cardiovascular:      Heart sounds: Normal heart sounds  Pulmonary:      Effort: Pulmonary effort is normal       Breath sounds: No rhonchi or rales  Comments: On 5L NC    Abdominal:      General: Bowel sounds are normal    Genitourinary:     Comments: Turner catheter in place  Musculoskeletal:      Right lower leg: Edema present  Left lower leg: Edema present  Skin:     General: Skin is warm and dry  Neurological:      Mental Status: Mental status is at baseline     Psychiatric:         Mood and Affect: Mood normal          Additional Data:     Labs:    Results from last 7 days   Lab Units 21  0535 21  3159 WBC Thousand/uL 7 39 7 56   HEMOGLOBIN g/dL 9 3* 9 2*   HEMATOCRIT % 34 5* 33 1*   PLATELETS Thousands/uL 325 282   NEUTROS PCT %  --  68   LYMPHS PCT %  --  9*   LYMPHO PCT % 4*  --    MONOS PCT %  --  9   MONO PCT % 3*  --    EOS PCT % 9* 11*     Results from last 7 days   Lab Units 02/25/21  0535  02/21/21  0912   POTASSIUM mmol/L 3 8   < > 3 4*   CHLORIDE mmol/L 112*   < > 112*   CO2 mmol/L 22   < > 25   BUN mg/dL 28*   < > 33*   CREATININE mg/dL 1 23   < > 1 23   CALCIUM mg/dL 9 1   < > 8 7   ALK PHOS U/L  --   --  71   ALT U/L  --   --  31   AST U/L  --   --  22    < > = values in this interval not displayed  * I Have Reviewed All Lab Data Listed Above  * Additional Pertinent Lab Tests Reviewed:  All Labs Within Last 24 Hours Reviewed    Imaging:    Imaging Reports Reviewed Today Include: None  Imaging Personally Reviewed by Myself Includes:  As above    Recent Cultures (last 7 days):           Last 24 Hours Medication List:   Current Facility-Administered Medications   Medication Dose Route Frequency Provider Last Rate    acetaminophen  650 mg Oral Q6H PRN Lonzell Cheeks, DO      apixaban  2 5 mg Oral BID Lonzell Cheeks, DO      aspirin  81 mg Oral Daily Lonzell Cheeks, DO      atorvastatin  40 mg Per NG Tube HS Lonzell Cheeks, DO      bumetanide  2 mg Oral Daily Rick Santana MD      cholecalciferol  2,000 Units Per NG Tube Daily Lonzell Cheeks, DO      insulin lispro  1-5 Units Subcutaneous HS KATHY Alejandro      insulin lispro  2-12 Units Subcutaneous TID AC KATHY Leonard      levETIRAcetam  750 mg Oral Q12H [address] [place] [address] Street, DO      lidocaine  1 patch Topical Daily PRN Jareth Castillo MD      melatonin  6 mg Oral HS Lonzell Cheeks, DO      metoprolol tartrate  [facility]H [facility]      midodrine  10 mg Oral Q8H Lonzell Cheeks, DO      multivitamin-minerals  1 tablet Oral Daily Lonzell Cheeks, DO      omeprazole (PRILOSEC) suspension 2 mg/mL  20 mg Oral Q12H Yao Houser DO      potassium chloride  40 mEq Oral TID With Meals Negin Dsouza MD      sertraline  125 mg Oral Daily Vivek Romo MD          Today, Patient Was Seen By: Serena Graham MD    ** Please Note: This note has been constructed using a voice recognition system   **

## 2021-02-25 NOTE — RESPIRATORY THERAPY NOTE
02/25/21 0004   Non-Invasive Information   SpO2 94 %   Resp Comments call from RN, pt angry and pulling off bipap, placed on 5lpm n/c

## 2021-02-26 LAB
ANION GAP SERPL CALCULATED.3IONS-SCNC: 10 MMOL/L (ref 4–13)
BUN SERPL-MCNC: 26 MG/DL (ref 5–25)
CALCIUM SERPL-MCNC: 9.2 MG/DL (ref 8.3–10.1)
CHLORIDE SERPL-SCNC: 115 MMOL/L (ref 100–108)
CO2 SERPL-SCNC: 24 MMOL/L (ref 21–32)
CREAT SERPL-MCNC: 1.29 MG/DL (ref 0.6–1.3)
GFR SERPL CREATININE-BSD FRML MDRD: 59 ML/MIN/1.73SQ M
GLUCOSE SERPL-MCNC: 100 MG/DL (ref 65–140)
GLUCOSE SERPL-MCNC: 101 MG/DL (ref 65–140)
GLUCOSE SERPL-MCNC: 102 MG/DL (ref 65–140)
GLUCOSE SERPL-MCNC: 108 MG/DL (ref 65–140)
GLUCOSE SERPL-MCNC: 123 MG/DL (ref 65–140)
GLUCOSE SERPL-MCNC: 146 MG/DL (ref 65–140)
POTASSIUM SERPL-SCNC: 4 MMOL/L (ref 3.5–5.3)
SODIUM SERPL-SCNC: 149 MMOL/L (ref 136–145)

## 2021-02-26 PROCEDURE — 99232 SBSQ HOSP IP/OBS MODERATE 35: CPT | Performed by: INTERNAL MEDICINE

## 2021-02-26 PROCEDURE — 80048 BASIC METABOLIC PNL TOTAL CA: CPT | Performed by: FAMILY MEDICINE

## 2021-02-26 PROCEDURE — 82948 REAGENT STRIP/BLOOD GLUCOSE: CPT

## 2021-02-26 PROCEDURE — 94762 N-INVAS EAR/PLS OXIMTRY CONT: CPT

## 2021-02-26 PROCEDURE — 94760 N-INVAS EAR/PLS OXIMETRY 1: CPT

## 2021-02-26 RX ADMIN — POTASSIUM CHLORIDE 40 MEQ: 1500 TABLET, EXTENDED RELEASE ORAL at 16:18

## 2021-02-26 RX ADMIN — MIDODRINE HYDROCHLORIDE 10 MG: 5 TABLET ORAL at 16:18

## 2021-02-26 RX ADMIN — ASPIRIN 81 MG CHEWABLE TABLET 81 MG: 81 TABLET CHEWABLE at 09:04

## 2021-02-26 RX ADMIN — Medication 1 TABLET: at 09:04

## 2021-02-26 RX ADMIN — MIDODRINE HYDROCHLORIDE 10 MG: 5 TABLET ORAL at 06:19

## 2021-02-26 RX ADMIN — BUMETANIDE 2 MG: 1 TABLET ORAL at 09:09

## 2021-02-26 RX ADMIN — Medication 20 MG: at 06:23

## 2021-02-26 RX ADMIN — POTASSIUM CHLORIDE 40 MEQ: 1500 TABLET, EXTENDED RELEASE ORAL at 12:06

## 2021-02-26 RX ADMIN — METOPROLOL TARTRATE 25 MG: 25 TABLET, FILM COATED ORAL at 09:04

## 2021-02-26 RX ADMIN — LEVETIRACETAM 750 MG: 500 TABLET, FILM COATED ORAL at 20:37

## 2021-02-26 RX ADMIN — ACETAMINOPHEN 650 MG: 325 TABLET, FILM COATED ORAL at 10:39

## 2021-02-26 RX ADMIN — LEVETIRACETAM 750 MG: 500 TABLET, FILM COATED ORAL at 09:05

## 2021-02-26 RX ADMIN — METOPROLOL TARTRATE 25 MG: 25 TABLET, FILM COATED ORAL at 20:41

## 2021-02-26 RX ADMIN — APIXABAN 2.5 MG: 2.5 TABLET, FILM COATED ORAL at 18:12

## 2021-02-26 RX ADMIN — APIXABAN 2.5 MG: 2.5 TABLET, FILM COATED ORAL at 09:05

## 2021-02-26 RX ADMIN — Medication 2000 UNITS: at 09:06

## 2021-02-26 RX ADMIN — Medication 6 MG: at 22:18

## 2021-02-26 RX ADMIN — ACETAMINOPHEN 650 MG: 325 TABLET, FILM COATED ORAL at 20:37

## 2021-02-26 RX ADMIN — DESMOPRESSIN ACETATE 40 MG: 0.2 TABLET ORAL at 22:18

## 2021-02-26 RX ADMIN — Medication 20 MG: at 18:12

## 2021-02-26 RX ADMIN — POTASSIUM CHLORIDE 40 MEQ: 1500 TABLET, EXTENDED RELEASE ORAL at 09:05

## 2021-02-26 RX ADMIN — SERTRALINE 125 MG: 25 TABLET, FILM COATED ORAL at 09:04

## 2021-02-26 NOTE — PHYSICAL THERAPY NOTE
PHYSICAL THERAPY SCREEN NOTE    Patient Name: Lizet PLATA Date: 0/[birth year]/1748     Duplicate orders  At baseline, pt requires a elo lift for OOB and is dependent to roll in the bed  At this time, pt is at his functional mobility and has no skilled acute PT needs  Will DC new PT orders      Landon Henao, PT, DPT

## 2021-02-26 NOTE — UTILIZATION REVIEW
Continued Stay Review    Date: 2/26/2021                          Current Patient Class: inpatient     Current Level of Care: med surg     HPI:60 y o  male initially admitted on *1/17/2021 Acute hypoxia respiratory failure due to prior COVID 19 pneumonia  Presented at [facility] w resp distress & AMS  Intubated became Hypotensive & transferred to [facility] ;     Assessment/Plan: *2/26/2021 Provider: COVID 19 infection was on severe pathway  2/1/2021-2/16/2021- intubated  actemra on 1/28, s/p conval plasma 1/17, received IV remdesivir, ESBL & E coli PNA superimposed & completed 10 day antibx  Completed 31 day sof Decadron on 2/15/2021  Cont Keppra - patient w no seizure hx but had witnessed tonic/clonic episode; EEG 2/2/21 Background activities are too slow suggesting moderate diffuse cerebral dysfunction of nonspecific etiology  Monitor BMP for Hypernatremia/ monitor Hypokalemia      CM management following closely, per CM patient has been optioned and will require county to come and assess him  2/26/2021 PT: At baseline, pt requires a elo lift for OOB and is dependent to roll in the bed  At this time, pt is at his functional mobility and has no skilled acute PT needs  Will DC new PT orders  2/26/2021 CM: Call received from Vic Valdez  that she spoke with the the state and pt will be needing a new PASRR and MA-51 completed even though pt did not have a plan  Clinicals just need to be reviewed to make sure pt is nursing home eligible   CM completed new PASRR and MA-51 and faxed over with the clinicals  Pertinent Labs/Diagnostic Results:       Results from last 7 days   Lab Units 02/25/21  0535 02/24/21  0606 02/23/21  0532 02/21/21  0915 02/20/21  0437   WBC Thousand/uL 7 39 7 56 7 70 9 20 7 08   HEMOGLOBIN g/dL 9 3* 9 2* 9 5* 8 4* 7 9*   HEMATOCRIT % 34 5* 33 1* 33 7* 31 0* 28 6*   PLATELETS Thousands/uL 325 282 265 205 162   NEUTROS ABS Thousands/µL  --  5 21 5 52 6 63 4 89   BANDS PCT % 6 --   --   --   --          Results from last 7 days   Lab Units 02/26/21  0548 02/25/21  0535 02/24/21  0606 02/23/21  0532 02/21/21  0912 02/20/21  0437   SODIUM mmol/L 149* 143 146* 144 146* 148*   POTASSIUM mmol/L 4 0 3 8 3 7 3 0* 3 4* 3 1*   CHLORIDE mmol/L 115* 112* 115* 111* 112* 112*   CO2 mmol/L 24 22 22 23 25 27   ANION GAP mmol/L 10 9 9 10 9 9   BUN mg/dL 26* 28* 30* 31* 33* 35*   CREATININE mg/dL 1 29 1 23 1 27 1 28 1 23 1 25   EGFR ml/min/1 73sq m 59 63 61 60 63 62   CALCIUM mg/dL 9 2 9 1 9 0 9 0 8 7 8 5   MAGNESIUM mg/dL  --   --   --   --   --  2 1     Results from last 7 days   Lab Units 02/21/21  0912 02/20/21  0437   AST U/L 22 19   ALT U/L 31 28   ALK PHOS U/L 71 68   TOTAL PROTEIN g/dL 6 8 6 5   ALBUMIN g/dL 2 7* 2 7*   TOTAL BILIRUBIN mg/dL 0 52 0 54     Results from last 7 days   Lab Units 02/26/21  1503 02/26/21  1107 02/26/21  0643 02/25/21  2223 02/25/21  1952 02/25/21  1750 02/25/21  1551 02/25/21  1113 02/25/21  0808 02/24/21 2012 02/24/21  1517 02/24/21  1033   POC GLUCOSE mg/dl 123 146* 108 120 113 144* 101 113 124 117 134 108     Results from last 7 days   Lab Units 02/26/21  0548 02/25/21  0535 02/24/21  0606 02/23/21  0532 02/21/21  0912 02/20/21  0437   GLUCOSE RANDOM mg/dL 102 110 101 100 100 97       Results from last 7 days   Lab Units 02/20/21  0437   NT-PRO BNP pg/mL 2,167*           Results from last 7 days   Lab Units 02/25/21  0535   TOTAL COUNTED  100           Vital Signs:   Date/Time  Temp  Pulse  Resp  BP  MAP (mmHg)  SpO2  FiO2 (%)  Calculated FIO2 (%) - Nasal Cannula  O2 Flow Rate (L/min)  Nasal Cannula O2 Flow Rate (L/min)  O2 Device  O2 Interface Device  Patient Position - Orthostatic VS   02/26/21 1643  --  --  --  --  --  100 %  --  --  4 L/min  --  Nasal cannula  --  --   02/26/21 1500  98 2 °F (36 8 °C)  55  18  123/67  --  99 %  --  --  --  --  Nasal cannula  --  Lying   02/26/21 0756  --  --  --  --  --  97 %  --  --  5 L/min  --  Nasal cannula  --  -- 02/26/21 0643  98 °F (36 7 °C)  99  19  115/75  --  99 %  --  --  --  --  --  --  Lying   02/26/21 0619  --  --  --  113/71  --  --  --  --  --  --  --  --  --         Medications:   Scheduled Medications:  apixaban, 2 5 mg, Oral, BID  aspirin, 81 mg, Oral, Daily  atorvastatin, 40 mg, Per NG Tube, HS  bumetanide, 2 mg, Oral, Daily  cholecalciferol, 2,000 Units, Per NG Tube, Daily  insulin lispro, 1-5 Units, Subcutaneous, HS  insulin lispro, 2-12 Units, Subcutaneous, TID AC  levETIRAcetam, 750 mg, Oral, Q12H [address]  melatonin, 6 mg, Oral, HS  metoprolol tartrate, 25 mg, Oral, Q12H ARACELIS  midodrine, 10 mg, Oral, Q8H  multivitamin-minerals, 1 tablet, Oral, Daily  omeprazole (PRILOSEC) suspension 2 mg/mL, 20 mg, Oral, Q12H  potassium chloride, 40 mEq, Oral, TID With Meals  sertraline, 125 mg, Oral, Daily      Continuous IV Infusions:     PRN Meds:  acetaminophen, 650 mg, Oral, Q6H PRN  lidocaine, 1 patch, Topical, Daily PRN        Discharge Plan: tbd, medically stable awaiting placement-     Network Utilization Review Department  ATTENTION: Please call with any questions or concerns to [phone] and carefully listen to the prompts so that you are directed to the right person  All voicemails are confidential   Duane Butler all requests for admission clinical reviews, approved or denied determinations and any other requests to dedicated fax number below belonging to the campus where the patient is receiving treatment   List of dedicated fax numbers for the Facilities:  [address] DENIALS (Administrative/Medical Necessity) [phone]   [address] [place] [address] (Maternity/NICU/Pediatrics) 261 [facility] [address] Wally Sykes 1948 (Addi Jim "Mila" [address]) [id number] [place] [address] [place] [address] Radha Gallagher 1481 [phone]   [place] [address] [phone]

## 2021-02-26 NOTE — CASE MANAGEMENT
Spoke with Jaquelin Morrison from [facility] to discuss if a new PASRR is required for pt to return back to [place] since pt had expressed suicidal ideation  Jaquelin Morrison stated she will reach out to the  and find out if one would be needed with a new MA-51  CM also reached out to Albert Joe Statin supervisor at [facility] she advised to complete a new PASRR and see if it comes out as positive and go from there

## 2021-02-26 NOTE — PROGRESS NOTES
Progress Note - Hermann Roque 1960, 63 y o  male MRN: [id number]    Unit/Bed#: S -01 Encounter: [id number]    Primary Care Provider: Alyssa Lyles DO   Date and time admitted to hospital: 1/17/2021  4:16 PM        COVID-19  Assessment & Plan  Patient confirmed BLGUH-19 positive 01/16/2021  Initially presented to [address] [facility] [facility] with respiratory distress and altered mental status  While at [facility], was intubated due to hypercapnia and hypoxia  He became hypotensive requiring pressor therapy and was transferred to [address] [place] [address] for further critical care management of severe COVID-19 infection  Cam from 's intubated on 01/17 and was subsequently extubated on 01/18  Plan  ·  Patient was severe COVID treatment pathway  · Actemra given 1/28  · S/p Conv Plasma 1/17  · Patient was considered a candidate for remdesivir  · ESBL and E coli pneumonia superimposed on COVID-19 pneumonia Ertapenum completed a 10 day   · Completed 31 days of Decadron on 02/15/2021                 Acute kidney injury superimposed on CKD [facility])  Assessment & Plan  POA, likely multifactorial 2/2 ATN in setting of COVID-19 + contrast induced nephropathy + vanco toxicity  Baseline creatinine 1 0-1 3    Plan    · Turner catheter in place  · Monitor output, -1 9L/ 24 hours  · Monitor I/Os  · Currently on Bumex 2 mg twice daily  · Monitor renal indices    History of stroke  Assessment & Plan  History of stroke 1 year ago  Now baseline resident at [facility]  Plan  · Continue Eliquis daily  · Continue ASA daily         Seizure [facility])  Assessment & Plan  Patient does not have a seizure history but does have history of CVA in the past  On 02/01 patient had a witnessed tonic clonic seizure episode and Neurology was consulted      Plan  · Continue on Keppra 750mg BID  · EEG 2/2/21 Background activities are too slow suggesting moderate diffuse cerebral dysfunction of nonspecific etiology  · Seizure precautions  · 2/1/2021-2/16/2021- intubated    Hypernatremia  Assessment & Plan  Patient previously being followed by Nephrology, was on D5W which was d/c as of 2/17  Plan  · Na remains elevated but stable  · Will continue to monitor with BMP      Anemia  Assessment & Plan  · No active bleeding  · Trend CBC daily   · hgb stable    · Transfuse for Hgb < 7 0     Atrial fibrillation (HCC)  Assessment & Plan  · Continue eliquis 2 5mg BID  · Holding cardizem secondary to hypotension  · Continue metoprolol 25mg BID    Depression  Assessment & Plan  · Continue sertraline 125 mg   · Per psych eval-patient is not suicidal, does not require 1:1  · Required to return to nursing facility    Morbid obesity   Assessment & Plan  · Continue work with PT/OT  · Recommend therapeutic lifestyle changes to promote weight loss    Dysphagia  Assessment & Plan  · Tolerating puree and nectar thick liquids by straw w/o overt dysphagia  · Dysphagia-2 diet with NT per speech pathology    History of aortic valve replacement with bioprosthetic valve  Assessment & Plan  · Echo 1/2021 - EF 55%, bioprosthetic AV with gradient of 16  · Murmur present at baseline  · Resume home cardizem    Hypokalemia  Assessment & Plan  Hypokalemia this am at 3 0    Plan  · Continue Kdur 40mg BID  · Continue to replace PRN  · Monitor with BMP    Hyperlipidemia  Assessment & Plan  · Continue statin    Essential hypertension  Assessment & Plan  · Hold home cardizem in setting of hypotension  · Continue metoprolol 25mg BID for rate control  · BP's stable on Midodrine TID          VTE Pharmacologic Prophylaxis:   Pharmacologic: Apixaban (Eliquis)  Mechanical VTE Prophylaxis in Place: Yes    Discussions with Specialists or Other Care Team Provider: Psych, Speech pathology  CM management following closely, per CM patient has been optioned and will require county to come and assess him      Education and Discussions with Family / Patient: Updated family (Spoke to wife- Sandra Godinez) stating she does not need a phone call everyday  Wants to be informed of only medical updates/discharge planning  Current Length of Stay: 40 day(s)    Current Patient Status: Inpatient     Discharge Plan / Estimated Discharge Date: TBD- medically stable awaiting placement    Code Status: Level 1 - Full Code      Subjective:   Patient laying in bed comfortably  Complains of headache this am, did contact nursing to give patient tylenol for HA  Objective:     Vitals:   Temp (24hrs), Av 9 °F (36 6 °C), Min:97 8 °F (36 6 °C), Max:98 °F (36 7 °C)    Temp:  [97 8 °F (36 6 °C)-98 °F (36 7 °C)] 98 °F (36 7 °C)  HR:  [88-99] 99  Resp:  [18-19] 19  BP: (105-126)/(51-75) 115/75  SpO2:  [93 %-99 %] 97 %  Body mass index is 48 8 kg/m²  Input and Output Summary (last 24 hours): Intake/Output Summary (Last 24 hours) at 2021 1320  Last data filed at 2021 1305  Gross per 24 hour   Intake 690 ml   Output 3000 ml   Net -2310 ml       Physical Exam:     Physical Exam  Constitutional:       Appearance: He is obese  HENT:      Head: Normocephalic and atraumatic  Right Ear: External ear normal       Left Ear: External ear normal       Nose: Nose normal    Eyes:      Conjunctiva/sclera: Conjunctivae normal    Cardiovascular:      Heart sounds: Normal heart sounds  Pulmonary:      Effort: Pulmonary effort is normal       Breath sounds: No rhonchi or rales  Comments: On 5L NC    Abdominal:      General: Bowel sounds are normal    Genitourinary:     Comments: Turner catheter in place  Rectal tube in palce  Musculoskeletal:      Right lower leg: Edema present  Left lower leg: Edema present  Skin:     General: Skin is warm and dry  Neurological:      Mental Status: Mental status is at baseline     Psychiatric:         Mood and Affect: Mood normal        Additional Data:     Labs:    Results from last 7 days   Lab Units 21  0535 21  0606   WBC Thousand/uL 7 39 7 56   HEMOGLOBIN g/dL 9 3* 9 2*   HEMATOCRIT % 34 5* 33 1*   PLATELETS Thousands/uL 325 282   NEUTROS PCT %  --  68   LYMPHS PCT %  --  9*   LYMPHO PCT % 4*  --    MONOS PCT %  --  9   MONO PCT % 3*  --    EOS PCT % 9* 11*     Results from last 7 days   Lab Units 02/26/21  0548  02/21/21  0912   POTASSIUM mmol/L 4 0   < > 3 4*   CHLORIDE mmol/L 115*   < > 112*   CO2 mmol/L 24   < > 25   BUN mg/dL 26*   < > 33*   CREATININE mg/dL 1 29   < > 1 23   CALCIUM mg/dL 9 2   < > 8 7   ALK PHOS U/L  --   --  71   ALT U/L  --   --  31   AST U/L  --   --  22    < > = values in this interval not displayed  * I Have Reviewed All Lab Data Listed Above  * Additional Pertinent Lab Tests Reviewed:  All Labs Within Last 24 Hours Reviewed    Imaging:    Imaging Reports Reviewed Today Include: None  Imaging Personally Reviewed by Myself Includes:  As above    Recent Cultures (last 7 days):           Last 24 Hours Medication List:   Current Facility-Administered Medications   Medication Dose Route Frequency Provider Last Rate    acetaminophen  650 mg Oral Q6H PRN Emma Hurst DO      apixaban  2 5 mg Oral BID Emma Hurst DO      aspirin  81 mg Oral Daily Emma Hurst DO      atorvastatin  40 mg Per NG Tube HS Emma Hurst DO      bumetanide  2 mg Oral Daily Moe Anne MD      cholecalciferol  2,000 Units Per NG Tube Daily Emma Hurst DO      insulin lispro  1-5 Units Subcutaneous HS KATHY Menendez      insulin lispro  2-12 Units Subcutaneous TID AC KATHY Leonard      levETIRAcetam  750 mg Oral Q12H [address] [place] [address], DO      lidocaine  1 patch Topical Daily PRN Fidelina Jha MD      melatonin  6 mg Oral HS Emma Hurst DO      metoprolol tartrate  25 mg Oral Q12H [address] [place] [address], DO      midodrine  10 mg Oral Q8H Emma Hurst DO      multivitamin-minerals  1 tablet Oral Daily Emma Hurst DO      omeprazole (PRILOSEC) suspension 2 mg/mL  20 mg Oral Q12H Emma Hurst DO  potassium chloride  40 mEq Oral TID With Meals Nidhi Galo MD      sertraline  125 mg Oral Daily Chema Rivera MD          Today, Patient Was Seen By: Teri Mcgraw MD    ** Please Note: This note has been constructed using a voice recognition system   **

## 2021-02-26 NOTE — CASE MANAGEMENT
Completed a new PASRR which came out to be negative  Section 3 of the PASRR for mental health section III- CM did check of that pt has a dx of Major Depressive Disorder but controlled with medication, section III-B pt has no recent hx of psychiatric stays or treatments in the last 2 years  Question 2 question asking if pt had any suicidal ideation the questions specifically asks if pt had a suicidal ideation WITH a plan which pt did not have a plan  Per psy note, pt denied any suicidal plan or intent  Pt has a hx of Seizures which pt stated was after the age of 24  Due to answering "no", there is no other indication that pt is a target  Discussed PASRR, with Marquis Santo and she will reach out to  and see if a new MA-51 will need to be completed for pt to return back to [place] [address] home  Will follow-up

## 2021-02-27 LAB
ANION GAP SERPL CALCULATED.3IONS-SCNC: 7 MMOL/L (ref 4–13)
BUN SERPL-MCNC: 27 MG/DL (ref 5–25)
CALCIUM SERPL-MCNC: 9.1 MG/DL (ref 8.3–10.1)
CHLORIDE SERPL-SCNC: 117 MMOL/L (ref 100–108)
CO2 SERPL-SCNC: 25 MMOL/L (ref 21–32)
CREAT SERPL-MCNC: 1.35 MG/DL (ref 0.6–1.3)
GFR SERPL CREATININE-BSD FRML MDRD: 56 ML/MIN/1.73SQ M
GLUCOSE SERPL-MCNC: 109 MG/DL (ref 65–140)
GLUCOSE SERPL-MCNC: 117 MG/DL (ref 65–140)
GLUCOSE SERPL-MCNC: 94 MG/DL (ref 65–140)
GLUCOSE SERPL-MCNC: 94 MG/DL (ref 65–140)
GLUCOSE SERPL-MCNC: 97 MG/DL (ref 65–140)
POTASSIUM SERPL-SCNC: 4.5 MMOL/L (ref 3.5–5.3)
SODIUM SERPL-SCNC: 149 MMOL/L (ref 136–145)

## 2021-02-27 PROCEDURE — 94762 N-INVAS EAR/PLS OXIMTRY CONT: CPT

## 2021-02-27 PROCEDURE — 94760 N-INVAS EAR/PLS OXIMETRY 1: CPT

## 2021-02-27 PROCEDURE — 99232 SBSQ HOSP IP/OBS MODERATE 35: CPT | Performed by: INTERNAL MEDICINE

## 2021-02-27 PROCEDURE — 82948 REAGENT STRIP/BLOOD GLUCOSE: CPT

## 2021-02-27 PROCEDURE — 80048 BASIC METABOLIC PNL TOTAL CA: CPT | Performed by: FAMILY MEDICINE

## 2021-02-27 RX ADMIN — METOPROLOL TARTRATE 25 MG: 25 TABLET, FILM COATED ORAL at 11:01

## 2021-02-27 RX ADMIN — APIXABAN 2.5 MG: 2.5 TABLET, FILM COATED ORAL at 11:01

## 2021-02-27 RX ADMIN — MIDODRINE HYDROCHLORIDE 10 MG: 5 TABLET ORAL at 10:00

## 2021-02-27 RX ADMIN — LEVETIRACETAM 750 MG: 500 TABLET, FILM COATED ORAL at 10:00

## 2021-02-27 RX ADMIN — POTASSIUM CHLORIDE 40 MEQ: 1500 TABLET, EXTENDED RELEASE ORAL at 17:53

## 2021-02-27 RX ADMIN — BUMETANIDE 2 MG: 1 TABLET ORAL at 11:00

## 2021-02-27 RX ADMIN — LEVETIRACETAM 750 MG: 500 TABLET, FILM COATED ORAL at 21:58

## 2021-02-27 RX ADMIN — ASPIRIN 81 MG CHEWABLE TABLET 81 MG: 81 TABLET CHEWABLE at 11:01

## 2021-02-27 RX ADMIN — Medication 1 TABLET: at 10:00

## 2021-02-27 RX ADMIN — APIXABAN 2.5 MG: 2.5 TABLET, FILM COATED ORAL at 17:53

## 2021-02-27 RX ADMIN — POTASSIUM CHLORIDE 40 MEQ: 1500 TABLET, EXTENDED RELEASE ORAL at 08:00

## 2021-02-27 RX ADMIN — POTASSIUM CHLORIDE 40 MEQ: 1500 TABLET, EXTENDED RELEASE ORAL at 11:00

## 2021-02-27 RX ADMIN — METOPROLOL TARTRATE 25 MG: 25 TABLET, FILM COATED ORAL at 21:59

## 2021-02-27 RX ADMIN — MIDODRINE HYDROCHLORIDE 10 MG: 5 TABLET ORAL at 00:59

## 2021-02-27 RX ADMIN — DESMOPRESSIN ACETATE 40 MG: 0.2 TABLET ORAL at 21:59

## 2021-02-27 RX ADMIN — MIDODRINE HYDROCHLORIDE 10 MG: 5 TABLET ORAL at 23:41

## 2021-02-27 RX ADMIN — Medication 2000 UNITS: at 10:00

## 2021-02-27 RX ADMIN — Medication 20 MG: at 17:54

## 2021-02-27 RX ADMIN — MIDODRINE HYDROCHLORIDE 10 MG: 5 TABLET ORAL at 17:53

## 2021-02-27 RX ADMIN — Medication 6 MG: at 21:59

## 2021-02-27 RX ADMIN — SERTRALINE 125 MG: 25 TABLET, FILM COATED ORAL at 10:00

## 2021-02-27 NOTE — RESPIRATORY THERAPY NOTE
02/27/21 0600   Non-Invasive Information   Resp Comments pt removed from bipap, pt is awake and placed on 4lpm

## 2021-02-27 NOTE — PLAN OF CARE
Problem: Prexisting or High Potential for Compromised Skin Integrity  Goal: Skin integrity is maintained or improved  Description: INTERVENTIONS:  - Identify patients at risk for skin breakdown  - Assess and monitor skin integrity  - Assess and monitor nutrition and hydration status  - Monitor labs   - Assess for incontinence   - Turn and reposition patient  - Assist with mobility/ambulation  - Relieve pressure over bony prominences  - Avoid friction and shearing  - Provide appropriate hygiene as needed including keeping skin clean and dry  - Evaluate need for skin moisturizer/barrier cream  - Collaborate with interdisciplinary team   - Patient/family teaching  - Consider wound care consult   Outcome: Progressing     Problem: Potential for Falls  Goal: Patient will remain free of falls  Description: INTERVENTIONS:  - Assess patient frequently for physical needs  -  Identify cognitive and physical deficits and behaviors that affect risk of falls    -  [place] fall precautions as indicated by assessment   - Educate patient/family on patient safety including physical limitations  - Instruct patient to call for assistance with activity based on assessment  - Modify environment to reduce risk of injury  - Consider OT/PT consult to assist with strengthening/mobility  Outcome: Progressing     Problem: SAFETY,RESTRAINT: NV/NON-SELF DESTRUCTIVE BEHAVIOR  Goal: Remains free of harm/injury (restraint for non violent/non self-detsructive behavior)  Description: INTERVENTIONS:  - Instruct patient/family regarding restraint use   - Assess and monitor physiologic and psychological status   - Provide interventions and comfort measures to meet assessed patient needs   - Identify and implement measures to help patient regain control  - Assess readiness for release of restraint   Outcome: Progressing  Goal: Returns to optimal restraint-free functioning  Description: INTERVENTIONS:  - Assess the patient's behavior and symptoms that indicate continued need for restraint  - Identify and implement measures to help patient regain control  - Assess readiness for release of restraint   Outcome: Progressing None

## 2021-02-27 NOTE — PROGRESS NOTES
Progress Note - Hermann Roque 1960, 63 y o  male MRN: [id number]    Unit/Bed#: S -01 Encounter: [id number]    Primary Care Provider: Alyssa Lyles DO   Date and time admitted to hospital: 1/17/2021  4:16 PM        COVID-19  Assessment & Plan  Patient confirmed CGDJS-09 positive 01/16/2021  [place]ially [place] [facility] with respiratory distress and altered mental status  While at [facility], was intubated due to hypercapnia and hypoxia  He became hypotensive requiring pressor therapy and was transferred to [facility] for further critical care management of severe COVID-19 infection  Cam from [place] intubated on 01/17 and was subsequently extubated on 01/18  Plan  ·  Patient was severe COVID treatment pathway  · Actemra given 1/28  · S/p Conv Plasma 1/17  · Patient was considered a candidate for remdesivir  · ESBL and E coli pneumonia superimposed on COVID-19 pneumonia Ertapenum completed a 10 day   · Completed 31 days of Decadron on 02/15/2021                 Acute kidney injury superimposed on CKD [facility])  Assessment & Plan  POA, likely multifactorial 2/2 ATN in setting of COVID-19 + contrast induced nephropathy + vanco toxicity  Baseline creatinine 1 0-1 3    Plan    · Turner catheter in place  · Monitor output, -1 9L/ 24 hours  · Monitor I/Os  · Currently on Bumex 2 mg twice daily  · Monitor renal indices    History of stroke  Assessment & Plan  History of stroke 1 year ago  Now baseline resident at [facility]  Plan  · Continue Eliquis daily  · Continue ASA daily         Seizure [facility])  Assessment & Plan  Patient does not have a seizure history but does have history of CVA in the past  On 02/01 patient had a witnessed tonic clonic seizure episode and Neurology was consulted      Plan  · Continue on Keppra 750mg BID  · EEG 2/2/21 Background activities are too slow suggesting moderate diffuse cerebral dysfunction of nonspecific etiology  · Seizure precautions  · 2/1/2021-2/16/2021- intubated    Hypernatremia  Assessment & Plan  Patient previously being followed by Nephrology, was on D5W which was d/c as of 2/17  Plan  · Na remains elevated but stable  · Will continue to monitor with BMP      Anemia  Assessment & Plan  · No active bleeding  · Trend CBC daily   · hgb stable    · Transfuse for Hgb < 7 0     Atrial fibrillation (HCC)  Assessment & Plan  · Continue eliquis 2 5mg BID  · Holding cardizem secondary to hypotension  · Continue metoprolol 25mg BID    Depression  Assessment & Plan  · Continue sertraline 125 mg   · Per psych eval-patient is not suicidal, does not require 1:1  · Required to return to nursing facility    Morbid obesity   Assessment & Plan  · Continue work with PT/OT  · Recommend therapeutic lifestyle changes to promote weight loss    Dysphagia  Assessment & Plan  · Tolerating puree and nectar thick liquids by straw w/o overt dysphagia  · Dysphagia-2 diet with NT per speech pathology    History of aortic valve replacement with bioprosthetic valve  Assessment & Plan  · Echo 1/2021 - EF 55%, bioprosthetic AV with gradient of 16  · Murmur present at baseline  · Resume home cardizem    Hypokalemia  Assessment & Plan  Hypokalemia this am at 3 0    Plan  · Continue Kdur 40mg BID  · Continue to replace PRN  · Monitor with BMP    Hyperlipidemia  Assessment & Plan  · Continue statin    Essential hypertension  Assessment & Plan  · Hold home cardizem in setting of hypotension  · Continue metoprolol 25mg BID for rate control  · BP's stable on Midodrine TID          VTE Pharmacologic Prophylaxis:   Pharmacologic: Apixaban (Eliquis)  Mechanical VTE Prophylaxis in Place: Yes    Discussions with Specialists or Other Care Team Provider: Psych, Speech pathology  CM management following closely, per CM patient has been optioned and will require county to come and assess him      Education and Discussions with Family / Patient: Updated family (Spoke to wife- Isabel Arciniega stating she does not need a phone call everyday  Wants to be informed of only medical updates/discharge planning  Current Length of Stay: 41 day(s)    Current Patient Status: Inpatient     Discharge Plan / Estimated Discharge Date: TBD- medically stable awaiting placement    Code Status: Level 1 - Full Code      Subjective:   Patient laying in bed comfortably  Per nursing rectal tube dysfunction noted this morning  Discussed to please contact [facility] if after rectal tube repositioning there are still issues  Objective:     Vitals:   Temp (24hrs), Av 5 °F (36 9 °C), Min:98 1 °F (36 7 °C), Max:98 8 °F (37 1 °C)    Temp:  [98 1 °F (36 7 °C)-98 8 °F (37 1 °C)] 98 8 °F (37 1 °C)  HR:  [74-99] 79  Resp:  [18-23] 20  BP: (113-129)/(61-74) 120/71  SpO2:  [96 %-100 %] 96 %  Body mass index is 49 32 kg/m²  Input and Output Summary (last 24 hours): Intake/Output Summary (Last 24 hours) at 2021 1732  Last data filed at 2021 0708  Gross per 24 hour   Intake 120 ml   Output 550 ml   Net -430 ml       Physical Exam:     Physical Exam  Constitutional:       Appearance: He is obese  HENT:      Head: Normocephalic and atraumatic  Right Ear: External ear normal       Left Ear: External ear normal       Nose: Nose normal    Eyes:      Conjunctiva/sclera: Conjunctivae normal    Cardiovascular:      Heart sounds: Normal heart sounds  Pulmonary:      Effort: Pulmonary effort is normal       Breath sounds: No rhonchi or rales  Comments: On 5L NC    Abdominal:      General: Bowel sounds are normal    Genitourinary:     Comments: Turner catheter in place  Rectal tube disloged  Musculoskeletal:      Right lower leg: Edema present  Left lower leg: Edema present  Skin:     General: Skin is warm and dry  Neurological:      Mental Status: Mental status is at baseline     Psychiatric:         Mood and Affect: Mood normal        Additional Data:     Labs:    Results from last 7 days   Lab Units 02/25/21  0535 02/24/21  0606   WBC Thousand/uL 7 39 7 56   HEMOGLOBIN g/dL 9 3* 9 2*   HEMATOCRIT % 34 5* 33 1*   PLATELETS Thousands/uL 325 282   NEUTROS PCT %  --  68   LYMPHS PCT %  --  9*   LYMPHO PCT % 4*  --    MONOS PCT %  --  9   MONO PCT % 3*  --    EOS PCT % 9* 11*     Results from last 7 days   Lab Units 02/27/21  0620  02/21/21  0912   POTASSIUM mmol/L 4 5   < > 3 4*   CHLORIDE mmol/L 117*   < > 112*   CO2 mmol/L 25   < > 25   BUN mg/dL 27*   < > 33*   CREATININE mg/dL 1 35*   < > 1 23   CALCIUM mg/dL 9 1   < > 8 7   ALK PHOS U/L  --   --  71   ALT U/L  --   --  31   AST U/L  --   --  22    < > = values in this interval not displayed  * I Have Reviewed All Lab Data Listed Above  * Additional Pertinent Lab Tests Reviewed:  All Labs Within Last 24 Hours Reviewed    Imaging:    Imaging Reports Reviewed Today Include: None  Imaging Personally Reviewed by Myself Includes:  As above    Recent Cultures (last 7 days):           Last 24 Hours Medication List:   Current Facility-Administered Medications   Medication Dose Route Frequency Provider Last Rate    acetaminophen  650 mg Oral Q6H PRN Nery Blood,       apixaban  2 5 mg Oral BID Nery Blood, DO      aspirin  81 mg Oral Daily Nery Blood, DO      atorvastatin  40 mg Per NG Tube HS Nery Blood,       bumetanide  2 mg Oral Daily Iesha Mckeon MD      cholecalciferol  2,000 Units Per NG Tube Daily Nery Blood DO      insulin lispro  1-5 Units Subcutaneous HS KATHY Mejia      insulin lispro  2-12 Units Subcutaneous TID AC KATHY Leonard      levETIRAcetam  750 [facility]12H [facility]      lidocaine  1 patch Topical Daily PRN Darren Freeman MD      melatonin  6 mg Oral HS Nery Blood,       metoprolol tartrate  25 mg Oral Q12H [facility], DO      midodrine  10 mg Oral Q8H Nery Blood, DO      multivitamin-minerals  1 tablet Oral Daily Nery Blood,       omeprazole (PRILOSEC) suspension 2 mg/mL  20 mg Oral Q12H Sherita Capone DO      potassium chloride  40 mEq Oral TID With Meals Clifton Bautista MD      sertraline  125 mg Oral Daily Bridger Bliss MD          Today, Patient Was Seen By: Holly Aguero MD    ** Please Note: This note has been constructed using a voice recognition system   **

## 2021-02-28 LAB
ANION GAP SERPL CALCULATED.3IONS-SCNC: 11 MMOL/L (ref 4–13)
BUN SERPL-MCNC: 28 MG/DL (ref 5–25)
CALCIUM SERPL-MCNC: 9.4 MG/DL (ref 8.3–10.1)
CHLORIDE SERPL-SCNC: 115 MMOL/L (ref 100–108)
CO2 SERPL-SCNC: 23 MMOL/L (ref 21–32)
CREAT SERPL-MCNC: 1.41 MG/DL (ref 0.6–1.3)
GFR SERPL CREATININE-BSD FRML MDRD: 53 ML/MIN/1.73SQ M
GLUCOSE SERPL-MCNC: 105 MG/DL (ref 65–140)
GLUCOSE SERPL-MCNC: 119 MG/DL (ref 65–140)
GLUCOSE SERPL-MCNC: 120 MG/DL (ref 65–140)
GLUCOSE SERPL-MCNC: 87 MG/DL (ref 65–140)
GLUCOSE SERPL-MCNC: 94 MG/DL (ref 65–140)
POTASSIUM SERPL-SCNC: 3.8 MMOL/L (ref 3.5–5.3)
SODIUM SERPL-SCNC: 149 MMOL/L (ref 136–145)

## 2021-02-28 PROCEDURE — 80048 BASIC METABOLIC PNL TOTAL CA: CPT | Performed by: FAMILY MEDICINE

## 2021-02-28 PROCEDURE — 94760 N-INVAS EAR/PLS OXIMETRY 1: CPT

## 2021-02-28 PROCEDURE — 82948 REAGENT STRIP/BLOOD GLUCOSE: CPT

## 2021-02-28 PROCEDURE — 99232 SBSQ HOSP IP/OBS MODERATE 35: CPT | Performed by: INTERNAL MEDICINE

## 2021-02-28 RX ORDER — BUMETANIDE 1 MG/1
2 TABLET ORAL DAILY
Status: DISCONTINUED | OUTPATIENT
Start: 2021-03-02 | End: 2021-03-04 | Stop reason: HOSPADM

## 2021-02-28 RX ADMIN — SERTRALINE 125 MG: 25 TABLET, FILM COATED ORAL at 09:24

## 2021-02-28 RX ADMIN — Medication 20 MG: at 06:31

## 2021-02-28 RX ADMIN — POTASSIUM CHLORIDE 40 MEQ: 1500 TABLET, EXTENDED RELEASE ORAL at 11:27

## 2021-02-28 RX ADMIN — MIDODRINE HYDROCHLORIDE 10 MG: 5 TABLET ORAL at 09:24

## 2021-02-28 RX ADMIN — MIDODRINE HYDROCHLORIDE 10 MG: 5 TABLET ORAL at 17:54

## 2021-02-28 RX ADMIN — LEVETIRACETAM 750 MG: 500 TABLET, FILM COATED ORAL at 09:23

## 2021-02-28 RX ADMIN — ACETAMINOPHEN 650 MG: 325 TABLET, FILM COATED ORAL at 17:54

## 2021-02-28 RX ADMIN — Medication 2000 UNITS: at 09:23

## 2021-02-28 RX ADMIN — MIDODRINE HYDROCHLORIDE 10 MG: 5 TABLET ORAL at 23:02

## 2021-02-28 RX ADMIN — BUMETANIDE 2 MG: 1 TABLET ORAL at 09:28

## 2021-02-28 RX ADMIN — APIXABAN 2.5 MG: 2.5 TABLET, FILM COATED ORAL at 17:54

## 2021-02-28 RX ADMIN — ASPIRIN 81 MG CHEWABLE TABLET 81 MG: 81 TABLET CHEWABLE at 09:24

## 2021-02-28 RX ADMIN — LIDOCAINE 1 PATCH: 50 PATCH TOPICAL at 09:23

## 2021-02-28 RX ADMIN — POTASSIUM CHLORIDE 40 MEQ: 1500 TABLET, EXTENDED RELEASE ORAL at 09:23

## 2021-02-28 RX ADMIN — LEVETIRACETAM 750 MG: 500 TABLET, FILM COATED ORAL at 21:18

## 2021-02-28 RX ADMIN — POTASSIUM CHLORIDE 40 MEQ: 1500 TABLET, EXTENDED RELEASE ORAL at 17:54

## 2021-02-28 RX ADMIN — DESMOPRESSIN ACETATE 40 MG: 0.2 TABLET ORAL at 21:18

## 2021-02-28 RX ADMIN — METOPROLOL TARTRATE 25 MG: 25 TABLET, FILM COATED ORAL at 09:24

## 2021-02-28 RX ADMIN — METOPROLOL TARTRATE 25 MG: 25 TABLET, FILM COATED ORAL at 21:18

## 2021-02-28 RX ADMIN — Medication 6 MG: at 21:18

## 2021-02-28 RX ADMIN — ACETAMINOPHEN 650 MG: 325 TABLET, FILM COATED ORAL at 09:24

## 2021-02-28 RX ADMIN — Medication 1 TABLET: at 09:24

## 2021-02-28 RX ADMIN — APIXABAN 2.5 MG: 2.5 TABLET, FILM COATED ORAL at 09:23

## 2021-02-28 NOTE — PLAN OF CARE
Problem: Prexisting or High Potential for Compromised Skin Integrity  Goal: Skin integrity is maintained or improved  Description: INTERVENTIONS:  - Identify patients at risk for skin breakdown  - Assess and monitor skin integrity  - Assess and monitor nutrition and hydration status  - Monitor labs   - Assess for incontinence   - Turn and reposition patient  - Assist with mobility/ambulation  - Relieve pressure over bony prominences  - Avoid friction and shearing  - Provide appropriate hygiene as needed including keeping skin clean and dry  - Evaluate need for skin moisturizer/barrier cream  - Collaborate with interdisciplinary team   - Patient/family teaching  - Consider wound care consult   Outcome: Progressing     Problem: Potential for Falls  Goal: Patient will remain free of falls  Description: INTERVENTIONS:  - Assess patient frequently for physical needs  -  Identify cognitive and physical deficits and behaviors that affect risk of falls  -  [place] fall precautions as indicated by assessment   - Educate patient/family on patient safety including physical limitations  - Instruct patient to call for assistance with activity based on assessment  - Modify environment to reduce risk of injury  - Consider OT/PT consult to assist with strengthening/mobility  Outcome: Progressing     Problem: Nutrition/Hydration-ADULT  Goal: Nutrient/Hydration intake appropriate for improving, restoring or maintaining nutritional needs  Description: Monitor and assess patient's nutrition/hydration status for malnutrition  Collaborate with interdisciplinary team and initiate plan and interventions as ordered  Monitor patient's weight and dietary intake as ordered or per policy  Utilize nutrition screening tool and intervene as necessary  Determine patient's food preferences and provide high-protein, high-caloric foods as appropriate       INTERVENTIONS:  - Monitor oral intake, urinary output, labs, and treatment plans  - Assess nutrition and hydration status and recommend course of action  - Evaluate amount of meals eaten  - Assist patient with eating if necessary   - Allow adequate time for meals  - Recommend/ encourage appropriate diets, oral nutritional supplements, and vitamin/mineral supplements  - Order, calculate, and assess calorie counts as needed  - Recommend, monitor, and adjust tube feedings and TPN/PPN based on assessed needs  - Assess need for intravenous fluids  - Provide specific nutrition/hydration education as appropriate  - Include patient/family/caregiver in decisions related to nutrition  Outcome: Progressing     Problem: NEUROSENSORY - ADULT  Goal: Achieves stable or improved neurological status  Description: INTERVENTIONS  - Monitor and report changes in neurological status  - Monitor vital signs such as temperature, blood pressure, glucose, and any other labs ordered   - Initiate measures to prevent increased intracranial pressure  - Monitor for seizure activity and implement precautions if appropriate      Outcome: Progressing  Goal: Achieves maximal functionality and self care  Description: INTERVENTIONS  - Monitor swallowing and airway patency with patient fatigue and changes in neurological status  - Encourage and assist patient to increase activity and self care     - Encourage visually impaired, hearing impaired and aphasic patients to use assistive/communication devices  Outcome: Progressing     Problem: CARDIOVASCULAR - ADULT  Goal: Maintains optimal cardiac output and hemodynamic stability  Description: INTERVENTIONS:  - Monitor I/O, vital signs and rhythm  - Monitor for S/S and trends of decreased cardiac output  - Administer and titrate ordered vasoactive medications to optimize hemodynamic stability  - Assess quality of pulses, skin color and temperature  - Assess for signs of decreased coronary artery perfusion  - Instruct patient to report change in severity of symptoms  Outcome: Progressing  Goal: Absence of cardiac dysrhythmias or at baseline rhythm  Description: INTERVENTIONS:  - Continuous cardiac monitoring, vital signs, obtain 12 lead EKG if ordered  - Administer antiarrhythmic and heart rate control medications as ordered  - Monitor electrolytes and administer replacement therapy as ordered  Outcome: Progressing     Problem: RESPIRATORY - ADULT  Goal: Achieves optimal ventilation and oxygenation  Description: INTERVENTIONS:  - Assess for changes in respiratory status  - Assess for changes in mentation and behavior  - Position to facilitate oxygenation and minimize respiratory effort  - Oxygen administered by appropriate delivery if ordered  - Initiate smoking cessation education as indicated  - Encourage broncho-pulmonary hygiene including cough, deep breathe, Incentive Spirometry  - Assess the need for suctioning and aspirate as needed  - Assess and instruct to report SOB or any respiratory difficulty  - Respiratory Therapy support as indicated  Outcome: Progressing     Problem: GENITOURINARY - ADULT  Goal: Maintains or returns to baseline urinary function  Description: INTERVENTIONS:  - Assess urinary function  - Encourage oral fluids to ensure adequate hydration if ordered  - Administer IV fluids as ordered to ensure adequate hydration  - Administer ordered medications as needed  - Offer frequent toileting  - Follow urinary retention protocol if ordered  Outcome: Progressing  Goal: Absence of urinary retention  Description: INTERVENTIONS:  - Assess patients ability to void and empty bladder  - Monitor I/O  - Bladder scan as needed  - Discuss with physician/AP medications to alleviate retention as needed  - Discuss catheterization for long term situations as appropriate  Outcome: Progressing  Goal: Urinary catheter remains patent  Description: INTERVENTIONS:  - Assess patency of urinary catheter  - If patient has a chronic bo, consider changing catheter if non-functioning  - Follow guidelines for intermittent irrigation of non-functioning urinary catheter  Outcome: Progressing     Problem: METABOLIC, FLUID AND ELECTROLYTES - ADULT  Goal: Electrolytes maintained within normal limits  Description: INTERVENTIONS:  - Monitor labs and assess patient for signs and symptoms of electrolyte imbalances  - Administer electrolyte replacement as ordered  - Monitor response to electrolyte replacements, including repeat lab results as appropriate  - Instruct patient on fluid and nutrition as appropriate  Outcome: Progressing  Goal: Fluid balance maintained  Description: INTERVENTIONS:  - Monitor labs   - Monitor I/O and WT  - Instruct patient on fluid and nutrition as appropriate  - Assess for signs & symptoms of volume excess or deficit  Outcome: Progressing  Goal: Glucose maintained within target range  Description: INTERVENTIONS:  - Monitor Blood Glucose as ordered  - Assess for signs and symptoms of hyperglycemia and hypoglycemia  - Administer ordered medications to maintain glucose within target range  - Assess nutritional intake and initiate nutrition service referral as needed  Outcome: Progressing     Problem: MUSCULOSKELETAL - ADULT  Goal: Maintain or return mobility to safest level of function  Description: INTERVENTIONS:  - Assess patient's ability to carry out ADLs; assess patient's baseline for ADL function and identify physical deficits which impact ability to perform ADLs (bathing, care of mouth/teeth, toileting, grooming, dressing, etc )  - Assess/evaluate cause of self-care deficits   - Assess range of motion  - Assess patient's mobility  - Assess patient's need for assistive devices and provide as appropriate  - Encourage maximum independence but intervene and supervise when necessary  - Involve family in performance of ADLs  - Assess for home care needs following discharge   - Consider OT consult to assist with ADL evaluation and planning for discharge  - Provide patient education as appropriate  Outcome: Progressing  Goal: Maintain proper alignment of affected body part  Description: INTERVENTIONS:  - Support, maintain and protect limb and body alignment  - Provide patient/ family with appropriate education  Outcome: Progressing     Problem: INFECTION - ADULT  Goal: Absence or prevention of progression during hospitalization  Description: INTERVENTIONS:  - Assess and monitor for signs and symptoms of infection  - Monitor lab/diagnostic results  - Monitor all insertion sites, i e  indwelling lines, tubes, and drains  - Monitor endotracheal if appropriate and nasal secretions for changes in amount and color  - Mayer appropriate cooling/warming therapies per order  - Administer medications as ordered  - Instruct and encourage patient and family to use good hand hygiene technique  - Identify and instruct in appropriate isolation precautions for identified infection/condition  Outcome: Progressing     Problem: DISCHARGE PLANNING  Goal: Discharge to home or other facility with appropriate resources  Description: INTERVENTIONS:  - Identify barriers to discharge w/patient and caregiver  - Arrange for needed discharge resources and transportation as appropriate  - Identify discharge learning needs (meds, wound care, etc )  - Arrange for interpretive services to assist at discharge as needed  - Refer to Case Management Department for coordinating discharge planning if the patient needs post-hospital services based on physician/advanced practitioner order or complex needs related to functional status, cognitive ability, or social support system  Outcome: Progressing     Problem: Knowledge Deficit  Goal: Patient/family/caregiver demonstrates understanding of disease process, treatment plan, medications, and discharge instructions  Description: Complete learning assessment and assess knowledge base    Interventions:  - Provide teaching at level of understanding  - Provide teaching via preferred learning methods  Outcome: Progressing     Problem: SAFETY,RESTRAINT: NV/NON-SELF DESTRUCTIVE BEHAVIOR  Goal: Remains free of harm/injury (restraint for non violent/non self-detsructive behavior)  Description: INTERVENTIONS:  - Instruct patient/family regarding restraint use   - Assess and monitor physiologic and psychological status   - Provide interventions and comfort measures to meet assessed patient needs   - Identify and implement measures to help patient regain control  - Assess readiness for release of restraint   Outcome: Progressing  Goal: Returns to optimal restraint-free functioning  Description: INTERVENTIONS:  - Assess the patient's behavior and symptoms that indicate continued need for restraint  - Identify and implement measures to help patient regain control  - Assess readiness for release of restraint   Outcome: Progressing

## 2021-02-28 NOTE — PROGRESS NOTES
Progress Note - Phil Horne 1960, 63 y o  male MRN: [id number]    Unit/Bed#: S -01 Encounter: [id number]    Primary Care Provider: María Torres DO   Date and time admitted to hospital: 1/17/2021  4:16 PM        COVID-19  Assessment & Plan  · Patient confirmed [id number] positive 01/16/2021  Initially presented to [address] [facility] with respiratory distress and altered mental status  While at [facility], was intubated due to hypercapnia and hypoxia  He became hypotensive requiring pressor therapy and was transferred to [facility] for further critical care management of severe COVID-19 infection  Intubated on 01/17 and was subsequently extubated on 01/18n  · Patient was severe COVID treatment pathway  · Actemra given 1/28  · S/p Conv Plasma 1/17  · Was not a candidate for Remdesivir  · Completed 31 days of Decadron on 2/15/21  · Completed 10 day course of Ertapenem  For ESBL and E coli pneumonia superimposed on COVID-19 pneumonia  · Respiratory status currently stable on 4 L nasal cannula O2 with sats 97% at rest            Acute kidney injury superimposed on CKD ([facility] [address] )  Assessment & Plan  · POA, likely multifactorial 2/2 ATN in setting of COVID-19 + contrast induced nephropathy + vanco toxicity  Baseline creatinine 1 0-1 3  · Seen by Nephrology during his hospital course   Renal function has improved , PATRICK resolved  · Currently on Bumex 2 mg daily  · Will hold Bumex dose tomorrow in view of rising creatinine levels with hypernatremia  · Continue to monitor renal function closely    Hypernatremia  Assessment & Plan  · Had been on D5W which was discontinued on 2/17/21  · Sodium level remains slightly elevated at 149 this morning  · Holding off on additional IV fluids at this time in view of edema and concerns for volume overload  · Will continue to monitor and encourage increased oral free water intake    Seizure (HCC)  Assessment & Plan  · No prior history of seizure disorder  · Had a witnessed tonic-clonic seizure episode on 2/1/21 and was seen by Neurology  · EEG 2/2/21 Background activities are too slow suggesting moderate diffuse cerebral dysfunction of nonspecific etiology  · Continue seizure precautions, Keppra 750 mg b i d  Hypokalemia  Assessment & Plan  · Resolved, potassium 3 8 today  · Continue to monitor    Essential hypertension  Assessment & Plan  · Has been hypotensive and on midodrine 10 mg q 8 hours  · Blood pressure stable and acceptable at this time      History of aortic valve replacement with bioprosthetic valve  Assessment & Plan  · Echo 1/2021 - EF 55%, bioprosthetic AV with gradient of 16  · No acute issues at this time  Anticoagulation not required    Atrial fibrillation (HCC)  Assessment & Plan  · Rate is well controlled  Continue eliquis 2 5mg BID  · Cardizem was discontinued due to hypotension  · He remains on metoprolol 25mg BID    Dysphagia  Assessment & Plan  · Tolerating puree and nectar thick liquids by straw w/o overt dysphagia  · Aspiration precautions    Depression  Assessment & Plan  ·  Continue Zoloft 125 mg daily    Anemia  Assessment & Plan  ·  Hemoglobin has been stable  Most recent check was 9 3    Morbid obesity   Assessment & Plan  · Continue work with PT/OT  · Recommend therapeutic lifestyle changes to promote weight loss    History of stroke  Assessment & Plan  · History of stroke 1 year ago  Currently long-term  resident at nursing facility  · Continue aspirin, statin, Eliquis    Hyperlipidemia  Assessment & Plan  · Continue statin    VTE Pharmacologic Prophylaxis:   Pharmacologic: Apixaban (Eliquis)  Mechanical VTE Prophylaxis in Place: No    Patient Centered Rounds: I have performed bedside rounds with nursing staff today      Discussions with Specialists or Other Care Team Provider:  nursing    Education and Discussions with Family / Patient:  Spouse does not want daily calls per documentation from resident    Current Length of Stay: 43 day(s)    Current Patient Status: Inpatient   Certification Statement: The patient will continue to require additional inpatient hospital stay due to Awaiting placement    Discharge Plan:  Medically stable for discharge and is awaiting placement    Code Status: Level 1 - Full Code      Subjective:   No acute overnight events  Patient reports left knee pain  Otherwise no new complaints    Objective:     Vitals:   Temp (24hrs), Av 2 °F (36 8 °C), Min:97 7 °F (36 5 °C), Max:98 8 °F (37 1 °C)    Temp:  [97 7 °F (36 5 °C)-98 8 °F (37 1 °C)] 98 2 °F (36 8 °C)  HR:  [55-97] 55  Resp:  [20] 20  BP: (113-137)/(56-71) 137/71  SpO2:  [95 %-99 %] 98 %  Body mass index is 49 32 kg/m²  Input and Output Summary (last 24 hours): Intake/Output Summary (Last 24 hours) at 2021 1402  Last data filed at 2021 0900  Gross per 24 hour   Intake 1060 ml   Output 1350 ml   Net -290 ml       Physical Exam:  General Appearance:    Alert, cooperative, no distress, appropriately responsive    Head:    Normocephalic,  Mucous membranes moist   Eyes:    Conjunctiva/corneas clear   Neck:   Supple   Lungs:      Clear bilaterally, decreased at the bases  On 4 L supplemental O2 with sats 97%, nonlabored respiration     Heart:    Regular rate and rhythm, S1 and S2    Abdomen:     Soft, non-tender,  Morbidly obese, nondistended   Extremities:   +3 bilateral pedal edema, +2 bilateral lower extremity edema   Neurologic:   Oriented to self and person  Thought he was at [facility]    Speech is clear         Additional Data:     Labs:    Results from last 7 days   Lab Units 21  0535 21  0606   WBC Thousand/uL 7 39 7 56   HEMOGLOBIN g/dL 9 3* 9 2*   HEMATOCRIT % 34 5* 33 1*   PLATELETS Thousands/uL 325 282   NEUTROS PCT %  --  68   LYMPHS PCT %  --  9*   LYMPHO PCT % 4*  --    MONOS PCT %  --  9   MONO PCT % 3*  --    EOS PCT % 9* 11*     Results from last 7 days   Lab Units 21  0704   POTASSIUM mmol/L 3 8   CHLORIDE mmol/L 115*   CO2 mmol/L 23   BUN mg/dL 28*   CREATININE mg/dL 1 41*   CALCIUM mg/dL 9 4           * I Have Reviewed All Lab Data Listed Above  * Additional Pertinent Lab Tests Reviewed: [address] Admission Reviewed    Cultures:   Blood Culture:   Lab Results   Component Value Date    BLOODCX No Growth After 5 Days  02/04/2021    BLOODCX No Growth After 5 Days  02/04/2021    BLOODCX No Growth After 5 Days  02/01/2021    BLOODCX No Growth After 5 Days  02/01/2021    BLOODCX No Growth After 5 Days  01/19/2021    BLOODCX No Growth After 5 Days  01/19/2021    BLOODCX Enterococcus faecalis (A) 01/17/2021    BLOODCX Staphylococcus haemolyticus (A) 01/17/2021    BLOODCX No Growth After 5 Days   01/17/2021     Urine Culture:   Lab Results   Component Value Date    URINECX 3630-3332 cfu/ml Gram Positive Cocci (A) 01/23/2019     Sputum Culture: No components found for: SPUTUMCX  Wound Culture: No results found for: WOUNDCULT    Last 24 Hours Medication List:   Current Facility-Administered Medications   Medication Dose Route Frequency Provider Last Rate    acetaminophen  650 mg Oral Q6H PRN Collette Stamp, DO      apixaban  2 5 mg Oral BID Collette Stamp, DO      aspirin  81 mg Oral Daily Collette Stamp, DO      atorvastatin  40 mg Per NG Tube HS Collette Stamp, DO      [START ON 3/2/2021] bumetanide  2 mg Oral Daily Gerry Ribeiro MD      cholecalciferol  2,000 Units Per NG Tube Daily Collette Stamp, DO      insulin lispro  1-5 Units Subcutaneous HS KATHY Bardales      insulin lispro  2-12 Units Subcutaneous TID AC KATHY Leonard      levETIRAcetam  750 mg Oral Q12H [facility], DO      lidocaine  1 patch Topical Daily PRN Chele Mancia MD      melatonin  6 mg Oral HS Collette Stamp, DO      metoprolol tartrate  25 mg Oral Q12H [facility], DO      midodrine  10 mg Oral Q8H Collette Stamp, DO      multivitamin-minerals  1 tablet Oral Daily Tonye Jonathon, DO      omeprazole (PRILOSEC) suspension 2 mg/mL  20 mg Oral Q12H Tonye Jonathon, DO      potassium chloride  40 mEq Oral TID With Meals Holly Cisse MD      sertraline  125 mg Oral Daily Allen Avilez MD          Today, Patient Was Seen By: Ze Mayfield MD    ** Please Note: Dragon 360 Dictation voice to text software may have been used in the creation of this document   **

## 2021-03-01 LAB
ANION GAP SERPL CALCULATED.3IONS-SCNC: 10 MMOL/L (ref 4–13)
ANISOCYTOSIS BLD QL SMEAR: PRESENT
BASOPHILS # BLD MANUAL: 0 THOUSAND/UL (ref 0–0.1)
BASOPHILS NFR MAR MANUAL: 0 % (ref 0–1)
BUN SERPL-MCNC: 30 MG/DL (ref 5–25)
CALCIUM SERPL-MCNC: 9.2 MG/DL (ref 8.3–10.1)
CHLORIDE SERPL-SCNC: 113 MMOL/L (ref 100–108)
CO2 SERPL-SCNC: 23 MMOL/L (ref 21–32)
CREAT SERPL-MCNC: 1.34 MG/DL (ref 0.6–1.3)
EOSINOPHIL # BLD MANUAL: 0.75 THOUSAND/UL (ref 0–0.4)
EOSINOPHIL NFR BLD MANUAL: 10 % (ref 0–6)
ERYTHROCYTE [DISTWIDTH] IN BLOOD BY AUTOMATED COUNT: 26.1 % (ref 11.6–15.1)
GFR SERPL CREATININE-BSD FRML MDRD: 57 ML/MIN/1.73SQ M
GLUCOSE SERPL-MCNC: 113 MG/DL (ref 65–140)
GLUCOSE SERPL-MCNC: 88 MG/DL (ref 65–140)
GLUCOSE SERPL-MCNC: 88 MG/DL (ref 65–140)
GLUCOSE SERPL-MCNC: 94 MG/DL (ref 65–140)
GLUCOSE SERPL-MCNC: 99 MG/DL (ref 65–140)
HCT VFR BLD AUTO: 35.8 % (ref 36.5–49.3)
HGB BLD-MCNC: 9.9 G/DL (ref 12–17)
LYMPHOCYTES # BLD AUTO: 0.75 THOUSAND/UL (ref 0.6–4.47)
LYMPHOCYTES # BLD AUTO: 10 % (ref 14–44)
MCH RBC QN AUTO: 24.8 PG (ref 26.8–34.3)
MCHC RBC AUTO-ENTMCNC: 27.7 G/DL (ref 31.4–37.4)
MCV RBC AUTO: 90 FL (ref 82–98)
METAMYELOCYTES NFR BLD MANUAL: 2 % (ref 0–1)
MONOCYTES # BLD AUTO: 1.13 THOUSAND/UL (ref 0–1.22)
MONOCYTES NFR BLD: 15 % (ref 4–12)
NEUTROPHILS # BLD MANUAL: 4.67 THOUSAND/UL (ref 1.85–7.62)
NEUTS BAND NFR BLD MANUAL: 2 % (ref 0–8)
NEUTS SEG NFR BLD AUTO: 60 % (ref 43–75)
NRBC BLD AUTO-RTO: 0 /100 WBCS
OVALOCYTES BLD QL SMEAR: PRESENT
PLATELET # BLD AUTO: 398 THOUSANDS/UL (ref 149–390)
PLATELET BLD QL SMEAR: ADEQUATE
PMV BLD AUTO: 11.3 FL (ref 8.9–12.7)
POLYCHROMASIA BLD QL SMEAR: PRESENT
POTASSIUM SERPL-SCNC: 3 MMOL/L (ref 3.5–5.3)
RBC # BLD AUTO: 4 MILLION/UL (ref 3.88–5.62)
RBC MORPH BLD: PRESENT
SODIUM SERPL-SCNC: 146 MMOL/L (ref 136–145)
TOTAL CELLS COUNTED SPEC: 100
VARIANT LYMPHS # BLD AUTO: 1 %
WBC # BLD AUTO: 7.54 THOUSAND/UL (ref 4.31–10.16)

## 2021-03-01 PROCEDURE — 85027 COMPLETE CBC AUTOMATED: CPT | Performed by: INTERNAL MEDICINE

## 2021-03-01 PROCEDURE — 82948 REAGENT STRIP/BLOOD GLUCOSE: CPT

## 2021-03-01 PROCEDURE — 94762 N-INVAS EAR/PLS OXIMTRY CONT: CPT

## 2021-03-01 PROCEDURE — 94760 N-INVAS EAR/PLS OXIMETRY 1: CPT

## 2021-03-01 PROCEDURE — 94660 CPAP INITIATION&MGMT: CPT

## 2021-03-01 PROCEDURE — 80048 BASIC METABOLIC PNL TOTAL CA: CPT | Performed by: FAMILY MEDICINE

## 2021-03-01 PROCEDURE — 99232 SBSQ HOSP IP/OBS MODERATE 35: CPT | Performed by: INTERNAL MEDICINE

## 2021-03-01 PROCEDURE — 85007 BL SMEAR W/DIFF WBC COUNT: CPT | Performed by: INTERNAL MEDICINE

## 2021-03-01 RX ORDER — POTASSIUM CHLORIDE 14.9 MG/ML
20 INJECTION INTRAVENOUS
Status: COMPLETED | OUTPATIENT
Start: 2021-03-01 | End: 2021-03-01

## 2021-03-01 RX ORDER — POTASSIUM CHLORIDE 20 MEQ/1
20 TABLET, EXTENDED RELEASE ORAL ONCE
Status: COMPLETED | OUTPATIENT
Start: 2021-03-01 | End: 2021-03-01

## 2021-03-01 RX ADMIN — Medication 2000 UNITS: at 10:30

## 2021-03-01 RX ADMIN — POTASSIUM CHLORIDE 20 MEQ: 14.9 INJECTION, SOLUTION INTRAVENOUS at 12:28

## 2021-03-01 RX ADMIN — Medication 20 MG: at 05:50

## 2021-03-01 RX ADMIN — LEVETIRACETAM 750 MG: 500 TABLET, FILM COATED ORAL at 23:05

## 2021-03-01 RX ADMIN — METOPROLOL TARTRATE 25 MG: 25 TABLET, FILM COATED ORAL at 10:32

## 2021-03-01 RX ADMIN — POTASSIUM CHLORIDE 20 MEQ: 1500 TABLET, EXTENDED RELEASE ORAL at 10:31

## 2021-03-01 RX ADMIN — ASPIRIN 81 MG CHEWABLE TABLET 81 MG: 81 TABLET CHEWABLE at 10:32

## 2021-03-01 RX ADMIN — LEVETIRACETAM 750 MG: 500 TABLET, FILM COATED ORAL at 10:32

## 2021-03-01 RX ADMIN — DESMOPRESSIN ACETATE 40 MG: 0.2 TABLET ORAL at 23:05

## 2021-03-01 RX ADMIN — APIXABAN 2.5 MG: 2.5 TABLET, FILM COATED ORAL at 17:50

## 2021-03-01 RX ADMIN — POTASSIUM CHLORIDE 40 MEQ: 1500 TABLET, EXTENDED RELEASE ORAL at 10:34

## 2021-03-01 RX ADMIN — MIDODRINE HYDROCHLORIDE 10 MG: 5 TABLET ORAL at 10:31

## 2021-03-01 RX ADMIN — POTASSIUM CHLORIDE 20 MEQ: 14.9 INJECTION, SOLUTION INTRAVENOUS at 10:38

## 2021-03-01 RX ADMIN — SERTRALINE 125 MG: 25 TABLET, FILM COATED ORAL at 10:31

## 2021-03-01 RX ADMIN — ACETAMINOPHEN 650 MG: 325 TABLET, FILM COATED ORAL at 11:15

## 2021-03-01 RX ADMIN — POTASSIUM CHLORIDE 40 MEQ: 1500 TABLET, EXTENDED RELEASE ORAL at 15:54

## 2021-03-01 RX ADMIN — MIDODRINE HYDROCHLORIDE 10 MG: 5 TABLET ORAL at 23:04

## 2021-03-01 RX ADMIN — APIXABAN 2.5 MG: 2.5 TABLET, FILM COATED ORAL at 10:41

## 2021-03-01 RX ADMIN — Medication 6 MG: at 23:04

## 2021-03-01 RX ADMIN — LIDOCAINE 1 PATCH: 50 PATCH TOPICAL at 11:15

## 2021-03-01 RX ADMIN — MIDODRINE HYDROCHLORIDE 10 MG: 5 TABLET ORAL at 15:54

## 2021-03-01 RX ADMIN — Medication 1 TABLET: at 10:31

## 2021-03-01 NOTE — PLAN OF CARE
Problem: Prexisting or High Potential for Compromised Skin Integrity  Goal: Skin integrity is maintained or improved  Description: INTERVENTIONS:  - Identify patients at risk for skin breakdown  - Assess and monitor skin integrity  - Assess and monitor nutrition and hydration status  - Monitor labs   - Assess for incontinence   - Turn and reposition patient  - Assist with mobility/ambulation  - Relieve pressure over bony prominences  - Avoid friction and shearing  - Provide appropriate hygiene as needed including keeping skin clean and dry  - Evaluate need for skin moisturizer/barrier cream  - Collaborate with interdisciplinary team   - Patient/family teaching  - Consider wound care consult   Outcome: Progressing     Problem: Potential for Falls  Goal: Patient will remain free of falls  Description: INTERVENTIONS:  - Assess patient frequently for physical needs  -  Identify cognitive and physical deficits and behaviors that affect risk of falls  -  [place] fall precautions as indicated by assessment   - Educate patient/family on patient safety including physical limitations  - Instruct patient to call for assistance with activity based on assessment  - Modify environment to reduce risk of injury  - Consider OT/PT consult to assist with strengthening/mobility  Outcome: Progressing     Problem: Nutrition/Hydration-ADULT  Goal: Nutrient/Hydration intake appropriate for improving, restoring or maintaining nutritional needs  Description: Monitor and assess patient's nutrition/hydration status for malnutrition  Collaborate with interdisciplinary team and initiate plan and interventions as ordered  Monitor patient's weight and dietary intake as ordered or per policy  Utilize nutrition screening tool and intervene as necessary  Determine patient's food preferences and provide high-protein, high-caloric foods as appropriate       INTERVENTIONS:  - Monitor oral intake, urinary output, labs, and treatment plans  - Assess nutrition and hydration status and recommend course of action  - Evaluate amount of meals eaten  - Assist patient with eating if necessary   - Allow adequate time for meals  - Recommend/ encourage appropriate diets, oral nutritional supplements, and vitamin/mineral supplements  - Order, calculate, and assess calorie counts as needed  - Recommend, monitor, and adjust tube feedings and TPN/PPN based on assessed needs  - Assess need for intravenous fluids  - Provide specific nutrition/hydration education as appropriate  - Include patient/family/caregiver in decisions related to nutrition  Outcome: Progressing     Problem: NEUROSENSORY - ADULT  Goal: Achieves stable or improved neurological status  Description: INTERVENTIONS  - Monitor and report changes in neurological status  - Monitor vital signs such as temperature, blood pressure, glucose, and any other labs ordered   - Initiate measures to prevent increased intracranial pressure  - Monitor for seizure activity and implement precautions if appropriate      Outcome: Progressing  Goal: Achieves maximal functionality and self care  Description: INTERVENTIONS  - Monitor swallowing and airway patency with patient fatigue and changes in neurological status  - Encourage and assist patient to increase activity and self care     - Encourage visually impaired, hearing impaired and aphasic patients to use assistive/communication devices  Outcome: Progressing     Problem: CARDIOVASCULAR - ADULT  Goal: Maintains optimal cardiac output and hemodynamic stability  Description: INTERVENTIONS:  - Monitor I/O, vital signs and rhythm  - Monitor for S/S and trends of decreased cardiac output  - Administer and titrate ordered vasoactive medications to optimize hemodynamic stability  - Assess quality of pulses, skin color and temperature  - Assess for signs of decreased coronary artery perfusion  - Instruct patient to report change in severity of symptoms  Outcome: Progressing  Goal: Absence of cardiac dysrhythmias or at baseline rhythm  Description: INTERVENTIONS:  - Continuous cardiac monitoring, vital signs, obtain 12 lead EKG if ordered  - Administer antiarrhythmic and heart rate control medications as ordered  - Monitor electrolytes and administer replacement therapy as ordered  Outcome: Progressing     Problem: RESPIRATORY - ADULT  Goal: Achieves optimal ventilation and oxygenation  Description: INTERVENTIONS:  - Assess for changes in respiratory status  - Assess for changes in mentation and behavior  - Position to facilitate oxygenation and minimize respiratory effort  - Oxygen administered by appropriate delivery if ordered  - Initiate smoking cessation education as indicated  - Encourage broncho-pulmonary hygiene including cough, deep breathe, Incentive Spirometry  - Assess the need for suctioning and aspirate as needed  - Assess and instruct to report SOB or any respiratory difficulty  - Respiratory Therapy support as indicated  Outcome: Progressing     Problem: GENITOURINARY - ADULT  Goal: Maintains or returns to baseline urinary function  Description: INTERVENTIONS:  - Assess urinary function  - Encourage oral fluids to ensure adequate hydration if ordered  - Administer IV fluids as ordered to ensure adequate hydration  - Administer ordered medications as needed  - Offer frequent toileting  - Follow urinary retention protocol if ordered  Outcome: Progressing  Goal: Absence of urinary retention  Description: INTERVENTIONS:  - Assess patients ability to void and empty bladder  - Monitor I/O  - Bladder scan as needed  - Discuss with physician/AP medications to alleviate retention as needed  - Discuss catheterization for long term situations as appropriate  Outcome: Progressing  Goal: Urinary catheter remains patent  Description: INTERVENTIONS:  - Assess patency of urinary catheter  - If patient has a chronic bo, consider changing catheter if non-functioning  - Follow guidelines for intermittent irrigation of non-functioning urinary catheter  Outcome: Progressing     Problem: METABOLIC, FLUID AND ELECTROLYTES - ADULT  Goal: Electrolytes maintained within normal limits  Description: INTERVENTIONS:  - Monitor labs and assess patient for signs and symptoms of electrolyte imbalances  - Administer electrolyte replacement as ordered  - Monitor response to electrolyte replacements, including repeat lab results as appropriate  - Instruct patient on fluid and nutrition as appropriate  Outcome: Progressing  Goal: Fluid balance maintained  Description: INTERVENTIONS:  - Monitor labs   - Monitor I/O and WT  - Instruct patient on fluid and nutrition as appropriate  - Assess for signs & symptoms of volume excess or deficit  Outcome: Progressing  Goal: Glucose maintained within target range  Description: INTERVENTIONS:  - Monitor Blood Glucose as ordered  - Assess for signs and symptoms of hyperglycemia and hypoglycemia  - Administer ordered medications to maintain glucose within target range  - Assess nutritional intake and initiate nutrition service referral as needed  Outcome: Progressing     Problem: MUSCULOSKELETAL - ADULT  Goal: Maintain or return mobility to safest level of function  Description: INTERVENTIONS:  - Assess patient's ability to carry out ADLs; assess patient's baseline for ADL function and identify physical deficits which impact ability to perform ADLs (bathing, care of mouth/teeth, toileting, grooming, dressing, etc )  - Assess/evaluate cause of self-care deficits   - Assess range of motion  - Assess patient's mobility  - Assess patient's need for assistive devices and provide as appropriate  - Encourage maximum independence but intervene and supervise when necessary  - Involve family in performance of ADLs  - Assess for home care needs following discharge   - Consider OT consult to assist with ADL evaluation and planning for discharge  - Provide patient education as appropriate  Outcome: Progressing  Goal: Maintain proper alignment of affected body part  Description: INTERVENTIONS:  - Support, maintain and protect limb and body alignment  - Provide patient/ family with appropriate education  Outcome: Progressing     Problem: INFECTION - ADULT  Goal: Absence or prevention of progression during hospitalization  Description: INTERVENTIONS:  - Assess and monitor for signs and symptoms of infection  - Monitor lab/diagnostic results  - Monitor all insertion sites, i e  indwelling lines, tubes, and drains  - Monitor endotracheal if appropriate and nasal secretions for changes in amount and color  - [place] appropriate cooling/warming therapies per order  - Administer medications as ordered  - Instruct and encourage patient and family to use good hand hygiene technique  - Identify and instruct in appropriate isolation precautions for identified infection/condition  Outcome: Progressing     Problem: DISCHARGE PLANNING  Goal: Discharge to home or other facility with appropriate resources  Description: INTERVENTIONS:  - Identify barriers to discharge w/patient and caregiver  - Arrange for needed discharge resources and transportation as appropriate  - Identify discharge learning needs (meds, wound care, etc )  - Arrange for interpretive services to assist at discharge as needed  - Refer to Case Management Department for coordinating discharge planning if the patient needs post-hospital services based on physician/advanced practitioner order or complex needs related to functional status, cognitive ability, or social support system  Outcome: Progressing     Problem: Knowledge Deficit  Goal: Patient/family/caregiver demonstrates understanding of disease process, treatment plan, medications, and discharge instructions  Description: Complete learning assessment and assess knowledge base    Interventions:  - Provide teaching at level of understanding  - Provide teaching via preferred learning methods  Outcome: Progressing     Problem: SAFETY,RESTRAINT: NV/NON-SELF DESTRUCTIVE BEHAVIOR  Goal: Remains free of harm/injury (restraint for non violent/non self-detsructive behavior)  Description: INTERVENTIONS:  - Instruct patient/family regarding restraint use   - Assess and monitor physiologic and psychological status   - Provide interventions and comfort measures to meet assessed patient needs   - Identify and implement measures to help patient regain control  - Assess readiness for release of restraint   Outcome: Progressing  Goal: Returns to optimal restraint-free functioning  Description: INTERVENTIONS:  - Assess the patient's behavior and symptoms that indicate continued need for restraint  - Identify and implement measures to help patient regain control  - Assess readiness for release of restraint   Outcome: Progressing

## 2021-03-01 NOTE — PLAN OF CARE
Problem: Prexisting or High Potential for Compromised Skin Integrity  Goal: Skin integrity is maintained or improved  Description: INTERVENTIONS:  - Identify patients at risk for skin breakdown  - Assess and monitor skin integrity  - Assess and monitor nutrition and hydration status  - Monitor labs   - Assess for incontinence   - Turn and reposition patient  - Assist with mobility/ambulation  - Relieve pressure over bony prominences  - Avoid friction and shearing  - Provide appropriate hygiene as needed including keeping skin clean and dry  - Evaluate need for skin moisturizer/barrier cream  - Collaborate with interdisciplinary team   - Patient/family teaching  - Consider wound care consult   Outcome: Progressing     Problem: Potential for Falls  Goal: Patient will remain free of falls  Description: INTERVENTIONS:  - Assess patient frequently for physical needs  -  Identify cognitive and physical deficits and behaviors that affect risk of falls  -  [place] fall precautions as indicated by assessment   - Educate patient/family on patient safety including physical limitations  - Instruct patient to call for assistance with activity based on assessment  - Modify environment to reduce risk of injury  - Consider OT/PT consult to assist with strengthening/mobility  Outcome: Progressing     Problem: Nutrition/Hydration-ADULT  Goal: Nutrient/Hydration intake appropriate for improving, restoring or maintaining nutritional needs  Description: Monitor and assess patient's nutrition/hydration status for malnutrition  Collaborate with interdisciplinary team and initiate plan and interventions as ordered  Monitor patient's weight and dietary intake as ordered or per policy  Utilize nutrition screening tool and intervene as necessary  Determine patient's food preferences and provide high-protein, high-caloric foods as appropriate       INTERVENTIONS:  - Monitor oral intake, urinary output, labs, and treatment plans  - Assess nutrition and hydration status and recommend course of action  - Evaluate amount of meals eaten  - Assist patient with eating if necessary   - Allow adequate time for meals  - Recommend/ encourage appropriate diets, oral nutritional supplements, and vitamin/mineral supplements  - Order, calculate, and assess calorie counts as needed  - Recommend, monitor, and adjust tube feedings and TPN/PPN based on assessed needs  - Assess need for intravenous fluids  - Provide specific nutrition/hydration education as appropriate  - Include patient/family/caregiver in decisions related to nutrition  Outcome: Progressing     Problem: NEUROSENSORY - ADULT  Goal: Achieves stable or improved neurological status  Description: INTERVENTIONS  - Monitor and report changes in neurological status  - Monitor vital signs such as temperature, blood pressure, glucose, and any other labs ordered   - Initiate measures to prevent increased intracranial pressure  - Monitor for seizure activity and implement precautions if appropriate      Outcome: Progressing  Goal: Achieves maximal functionality and self care  Description: INTERVENTIONS  - Monitor swallowing and airway patency with patient fatigue and changes in neurological status  - Encourage and assist patient to increase activity and self care     - Encourage visually impaired, hearing impaired and aphasic patients to use assistive/communication devices  Outcome: Progressing     Problem: CARDIOVASCULAR - ADULT  Goal: Maintains optimal cardiac output and hemodynamic stability  Description: INTERVENTIONS:  - Monitor I/O, vital signs and rhythm  - Monitor for S/S and trends of decreased cardiac output  - Administer and titrate ordered vasoactive medications to optimize hemodynamic stability  - Assess quality of pulses, skin color and temperature  - Assess for signs of decreased coronary artery perfusion  - Instruct patient to report change in severity of symptoms  Outcome: Progressing  Goal: Absence of cardiac dysrhythmias or at baseline rhythm  Description: INTERVENTIONS:  - Continuous cardiac monitoring, vital signs, obtain 12 lead EKG if ordered  - Administer antiarrhythmic and heart rate control medications as ordered  - Monitor electrolytes and administer replacement therapy as ordered  Outcome: Progressing     Problem: RESPIRATORY - ADULT  Goal: Achieves optimal ventilation and oxygenation  Description: INTERVENTIONS:  - Assess for changes in respiratory status  - Assess for changes in mentation and behavior  - Position to facilitate oxygenation and minimize respiratory effort  - Oxygen administered by appropriate delivery if ordered  - Initiate smoking cessation education as indicated  - Encourage broncho-pulmonary hygiene including cough, deep breathe, Incentive Spirometry  - Assess the need for suctioning and aspirate as needed  - Assess and instruct to report SOB or any respiratory difficulty  - Respiratory Therapy support as indicated  Outcome: Progressing     Problem: GENITOURINARY - ADULT  Goal: Maintains or returns to baseline urinary function  Description: INTERVENTIONS:  - Assess urinary function  - Encourage oral fluids to ensure adequate hydration if ordered  - Administer IV fluids as ordered to ensure adequate hydration  - Administer ordered medications as needed  - Offer frequent toileting  - Follow urinary retention protocol if ordered  Outcome: Progressing  Goal: Absence of urinary retention  Description: INTERVENTIONS:  - Assess patients ability to void and empty bladder  - Monitor I/O  - Bladder scan as needed  - Discuss with physician/AP medications to alleviate retention as needed  - Discuss catheterization for long term situations as appropriate  Outcome: Progressing  Goal: Urinary catheter remains patent  Description: INTERVENTIONS:  - Assess patency of urinary catheter  - If patient has a chronic bo, consider changing catheter if non-functioning  - Follow guidelines for intermittent irrigation of non-functioning urinary catheter  Outcome: Progressing     Problem: METABOLIC, FLUID AND ELECTROLYTES - ADULT  Goal: Electrolytes maintained within normal limits  Description: INTERVENTIONS:  - Monitor labs and assess patient for signs and symptoms of electrolyte imbalances  - Administer electrolyte replacement as ordered  - Monitor response to electrolyte replacements, including repeat lab results as appropriate  - Instruct patient on fluid and nutrition as appropriate  Outcome: Progressing  Goal: Fluid balance maintained  Description: INTERVENTIONS:  - Monitor labs   - Monitor I/O and WT  - Instruct patient on fluid and nutrition as appropriate  - Assess for signs & symptoms of volume excess or deficit  Outcome: Progressing  Goal: Glucose maintained within target range  Description: INTERVENTIONS:  - Monitor Blood Glucose as ordered  - Assess for signs and symptoms of hyperglycemia and hypoglycemia  - Administer ordered medications to maintain glucose within target range  - Assess nutritional intake and initiate nutrition service referral as needed  Outcome: Progressing     Problem: MUSCULOSKELETAL - ADULT  Goal: Maintain or return mobility to safest level of function  Description: INTERVENTIONS:  - Assess patient's ability to carry out ADLs; assess patient's baseline for ADL function and identify physical deficits which impact ability to perform ADLs (bathing, care of mouth/teeth, toileting, grooming, dressing, etc )  - Assess/evaluate cause of self-care deficits   - Assess range of motion  - Assess patient's mobility  - Assess patient's need for assistive devices and provide as appropriate  - Encourage maximum independence but intervene and supervise when necessary  - Involve family in performance of ADLs  - Assess for home care needs following discharge   - Consider OT consult to assist with ADL evaluation and planning for discharge  - Provide patient education as appropriate  Outcome: Progressing  Goal: Maintain proper alignment of affected body part  Description: INTERVENTIONS:  - Support, maintain and protect limb and body alignment  - Provide patient/ family with appropriate education  Outcome: Progressing     Problem: INFECTION - ADULT  Goal: Absence or prevention of progression during hospitalization  Description: INTERVENTIONS:  - Assess and monitor for signs and symptoms of infection  - Monitor lab/diagnostic results  - Monitor all insertion sites, i e  indwelling lines, tubes, and drains  - Monitor endotracheal if appropriate and nasal secretions for changes in amount and color  - [place] appropriate cooling/warming therapies per order  - Administer medications as ordered  - Instruct and encourage patient and family to use good hand hygiene technique  - Identify and instruct in appropriate isolation precautions for identified infection/condition  Outcome: Progressing     Problem: DISCHARGE PLANNING  Goal: Discharge to home or other facility with appropriate resources  Description: INTERVENTIONS:  - Identify barriers to discharge w/patient and caregiver  - Arrange for needed discharge resources and transportation as appropriate  - Identify discharge learning needs (meds, wound care, etc )  - Arrange for interpretive services to assist at discharge as needed  - Refer to Case Management Department for coordinating discharge planning if the patient needs post-hospital services based on physician/advanced practitioner order or complex needs related to functional status, cognitive ability, or social support system  Outcome: Progressing     Problem: Knowledge Deficit  Goal: Patient/family/caregiver demonstrates understanding of disease process, treatment plan, medications, and discharge instructions  Description: Complete learning assessment and assess knowledge base    Interventions:  - Provide teaching at level of understanding  - Provide teaching via preferred learning methods  Outcome: Progressing     Problem: SAFETY,RESTRAINT: NV/NON-SELF DESTRUCTIVE BEHAVIOR  Goal: Remains free of harm/injury (restraint for non violent/non self-detsructive behavior)  Description: INTERVENTIONS:  - Instruct patient/family regarding restraint use   - Assess and monitor physiologic and psychological status   - Provide interventions and comfort measures to meet assessed patient needs   - Identify and implement measures to help patient regain control  - Assess readiness for release of restraint   Outcome: Progressing  Goal: Returns to optimal restraint-free functioning  Description: INTERVENTIONS:  - Assess the patient's behavior and symptoms that indicate continued need for restraint  - Identify and implement measures to help patient regain control  - Assess readiness for release of restraint   Outcome: Progressing

## 2021-03-01 NOTE — ASSESSMENT & PLAN NOTE
· Hypokalemia improving, up to 3 4 this morning  · Replaced with IV and PO K+  · Continue to monitor

## 2021-03-01 NOTE — ASSESSMENT & PLAN NOTE
· Patient confirmed COVID-19 positive 01/16/2021  Initially presented to [facility] with respiratory distress and altered mental status  While at [facility], was intubated due to hypercapnia and hypoxia  He became hypotensive requiring pressor therapy and was transferred to [facility] for further critical care management of severe COVID-19 infection   Intubated on 01/17 and was subsequently extubated on 01/18n  · Patient was severe COVID treatment pathway  · Actemra given 1/28  · S/p Conv Plasma 1/17  · Was not a candidate for Remdesivir  · Completed 31 days of Decadron on 2/15/21  · Completed 10 day course of Ertapenem  For ESBL and E coli pneumonia superimposed on COVID-19 pneumonia  · Respiratory status currently stable on 4 L nasal cannula O2 with sats 97% at rest

## 2021-03-01 NOTE — CASE MANAGEMENT
CM received phonecall from [employer] of [facility] (tel [phone]) regarding the [address] who requested additional signature on MA-51 which CM provided; awaiting response from [facility] in order for patient to discharge back to [facility]

## 2021-03-01 NOTE — PROGRESS NOTES
Progress Note - Seda Camejo 1960, 63 y o  male MRN: [id number]    Unit/Bed#: S -01 Encounter: [id number]    Primary Care Provider: Oswald Mandujano DO   Date and time admitted to hospital: 1/17/2021  4:16 PM        COVID-19  Assessment & Plan  · Patient confirmed [id number] positive 01/16/2021  Initially presented to [address] [facility] [facility] with respiratory distress and altered mental status  While at [facility], was intubated due to hypercapnia and hypoxia  He became hypotensive requiring pressor therapy and was transferred to [facility] for further critical care management of severe COVID-19 infection  Intubated on 01/17 and was subsequently extubated on 01/18n  · Patient was severe COVID treatment pathway  · Actemra given 1/28  · S/p Conv Plasma 1/17  · Was not a candidate for Remdesivir  · Completed 31 days of Decadron on 2/15/21  · Completed 10 day course of Ertapenem  For ESBL and E coli pneumonia superimposed on COVID-19 pneumonia  · Respiratory status currently stable on 4 L nasal cannula O2 with sats 97% at rest            Acute kidney injury superimposed on CKD ([facility] [address] )  Assessment & Plan  · POA, likely multifactorial 2/2 ATN in setting of COVID-19 + contrast induced nephropathy + vanco toxicity  Baseline creatinine 1 0-1 3  · Seen by Nephrology during his hospital course  Renal function has improved , PATRICK resolved  · Currently on Bumex 2 mg daily  · Will hold Bumex dose tomorrow in view of rising creatinine levels with hypernatremia  · Continue to monitor renal function closely    History of stroke  Assessment & Plan  · History of stroke 1 year ago    Currently long-term  resident at nursing facility  · Continue aspirin, statin, Eliquis    Seizure [facility])  Assessment & Plan  · No prior history of seizure disorder  · Had a witnessed tonic-clonic seizure episode on 2/1/21 and was seen by Neurology  · EEG 2/2/21 Background activities are too slow suggesting moderate diffuse cerebral dysfunction of nonspecific etiology  · Continue seizure precautions, Keppra 750 mg b i d  Hypernatremia  Assessment & Plan  · Had been on D5W which was discontinued on 2/17/21  · Sodium level remains slightly elevated at 149 this morning  · Holding off on additional IV fluids at this time in view of edema and concerns for volume overload  · Will continue to monitor and encourage increased oral free water intake    Anemia  Assessment & Plan  ·  Hemoglobin has been stable  Most recent check was 9 3    Atrial fibrillation (HCC)  Assessment & Plan  · Rate is well controlled  Continue eliquis 2 5mg BID  · Cardizem was discontinued due to hypotension  · He remains on metoprolol 25mg BID    Depression  Assessment & Plan  ·  Continue Zoloft 125 mg daily    Morbid obesity   Assessment & Plan  · Continue work with PT/OT  · Recommend therapeutic lifestyle changes to promote weight loss    History of aortic valve replacement with bioprosthetic valve  Assessment & Plan  · Echo 1/2021 - EF 55%, bioprosthetic AV with gradient of 16  · No acute issues at this time  Anticoagulation not required    Hypokalemia  Assessment & Plan  · Hypokaliemia to 3 0 this morning  · Replace PRN  · Continue to monitor    Hyperlipidemia  Assessment & Plan  · Continue statin    Essential hypertension  Assessment & Plan  · Has been hypotensive and on midodrine 10 mg q 8 hours  · Blood pressure stable and acceptable at this time          VTE Pharmacologic Prophylaxis:   Pharmacologic: Apixaban (Eliquis)  Mechanical VTE Prophylaxis in Place: Yes    Discussions with Specialists or Other Care Team Provider: Psych, Speech pathology  CM management following closely, per CM patient has been optioned and will require county to come and assess him  Education and Discussions with Family / Patient: Updated family (Spoke to wife- Sandra Godinez) stating she does not need a phone call everyday  Wants to be informed of only medical updates/discharge planning      Current Length of Stay: 37 day(s)    Current Patient Status: Inpatient     Discharge Plan / Estimated Discharge Date: TBD- medically stable awaiting placement    Code Status: Level 1 - Full Code      Subjective:   Patient laying in bed comfortably  Only complains of leg cramping this am     Objective:     Vitals:   Temp (24hrs), Av 3 °F (36 8 °C), Min:98 2 °F (36 8 °C), Max:98 4 °F (36 9 °C)    Temp:  [98 2 °F (36 8 °C)-98 4 °F (36 9 °C)] 98 4 °F (36 9 °C)  HR:  [77-99] 77  Resp:  [16-18] 18  BP: (102-132)/(52-69) 102/52  SpO2:  [90 %-100 %] 100 %  Body mass index is 47 78 kg/m²  Input and Output Summary (last 24 hours): Intake/Output Summary (Last 24 hours) at 3/1/2021 1710  Last data filed at 3/1/2021 1602  Gross per 24 hour   Intake 880 ml   Output 850 ml   Net 30 ml       Physical Exam:     Physical Exam  Constitutional:       Appearance: He is obese  HENT:      Head: Normocephalic and atraumatic  Right Ear: External ear normal       Left Ear: External ear normal       Nose: Nose normal    Eyes:      Conjunctiva/sclera: Conjunctivae normal    Cardiovascular:      Heart sounds: Normal heart sounds  Pulmonary:      Effort: Pulmonary effort is normal       Breath sounds: No rhonchi or rales  Comments: On 5L NC    Abdominal:      General: Bowel sounds are normal    Genitourinary:     Comments: Turner catheter in place  Rectal tube in place  Musculoskeletal:      Right lower leg: Edema present  Left lower leg: Edema present  Skin:     General: Skin is warm and dry  Neurological:      Mental Status: Mental status is at baseline     Psychiatric:         Mood and Affect: Mood normal        Additional Data:     Labs:    Results from last 7 days   Lab Units 21  0550  21  0606   WBC Thousand/uL 7 54   < > 7 56   HEMOGLOBIN g/dL 9 9*   < > 9 2*   HEMATOCRIT % 35 8*   < > 33 1*   PLATELETS Thousands/uL 398*   < > 282   NEUTROS PCT %  --   --  68   LYMPHS PCT %  --   --  9*   LYMPHO PCT % 10*   < >  --    MONOS PCT %  --   --  9   MONO PCT % 15*   < >  --    EOS PCT % 10*   < > 11*    < > = values in this interval not displayed  Results from last 7 days   Lab Units 03/01/21  0550   POTASSIUM mmol/L 3 0*   CHLORIDE mmol/L 113*   CO2 mmol/L 23   BUN mg/dL 30*   CREATININE mg/dL 1 34*   CALCIUM mg/dL 9 2           * I Have Reviewed All Lab Data Listed Above  * Additional Pertinent Lab Tests Reviewed:  All Labs Within Last 24 Hours Reviewed    Imaging:    Imaging Reports Reviewed Today Include: None  Imaging Personally Reviewed by Myself Includes:  As above    Recent Cultures (last 7 days):           Last 24 Hours Medication List:   Current Facility-Administered Medications   Medication Dose Route Frequency Provider Last Rate    acetaminophen  650 mg Oral Q6H PRN Deandre Byron, DO      apixaban  2 5 mg Oral BID Deandre Byron, DO      aspirin  81 mg Oral Daily Deandre Byron, DO      atorvastatin  40 mg Per NG Tube HS Deandre Byron, DO      [START ON 3/2/2021] bumetanide  2 mg Oral Daily Rachell Hess MD      cholecalciferol  2,000 Units Per NG Tube Daily Deandre Byron, DO      insulin lispro  1-5 Units Subcutaneous HS KATHY Kate      insulin lispro  2-12 Units Subcutaneous TID AC KATHY Leonard      levETIRAcetam  750 mg Oral Q12H [address] [place] [address] Street, DO      lidocaine  1 patch Topical Daily PRN Temo Diego MD      melatonin  6 mg Oral HS [facility]      metoprolol tartrate  25 mg Oral Q12H [address] [place] [address], DO      midodrine  10 mg Oral Q8H Deandre Byron, DO      multivitamin-minerals  1 tablet Oral Daily Deandre Byron, DO      omeprazole (PRILOSEC) suspension 2 mg/mL  20 mg Oral Q12H Deandre Byron, DO      potassium chloride  40 mEq Oral TID With Meals Lisa Jackson MD      sertraline  125 mg Oral Daily Wilfred Conde MD          Today, Patient Was Seen By: Michelle Francis MD    ** Please Note: This note has been constructed using a voice recognition system   **

## 2021-03-01 NOTE — UTILIZATION REVIEW
Continued Stay Review    Date: 2/26/2021                          Current Patient Class: inpatient     Current Level of Care: med surg     HPI:60 y o  male initially admitted on *1/17/2021 Acute hypoxia respiratory failure due to prior COVID 19 pneumonia  [facility]ed at [facility] w resp distress & AMS  Intubated became Hypotensive & [place] [place] ;     Assessment/Plan: *2/26/2021 Provider: COVID 19 infection was on severe pathway  2/1/2021-2/16/2021- intubated  actemra on 1/28, s/p conval plasma 1/17, received IV remdesivir, ESBL & E coli PNA superimposed & completed 10 day antibx  Completed 31 day sof Decadron on 2/15/2021  Cont Keppra - patient w no seizure hx but had witnessed tonic/clonic episode; EEG 2/2/21 Background activities are too slow suggesting moderate diffuse cerebral dysfunction of nonspecific etiology  Monitor BMP for Hypernatremia/ monitor Hypokalemia      CM management following closely, per CM patient has been optioned and will require county to come and assess him  2/26/2021 PT: At baseline, pt requires a elo lift for OOB and is dependent to roll in the bed  At this time, pt is at his functional mobility and has no skilled acute PT needs  Will DC new PT orders  2/26/2021 CM: Call received from Vic Valdez  that she spoke with the the state and pt will be needing a new PASRR and MA-51 completed even though pt did not have a plan  Clinicals just need to be reviewed to make sure pt is nursing home eligible   CM completed new PASRR and MA-51 and faxed over with the clinicals  Pertinent Labs/Diagnostic Results:       Results from last 7 days   Lab Units 03/01/21  0550 02/25/21  0535 02/24/21  0606 02/23/21  0532   WBC Thousand/uL 7 54 7 39 7 56 7 70   HEMOGLOBIN g/dL 9 9* 9 3* 9 2* 9 5*   HEMATOCRIT % 35 8* 34 5* 33 1* 33 7*   PLATELETS Thousands/uL 398* 325 282 265   NEUTROS ABS Thousands/µL  --   --  5 21 5 52   BANDS PCT % 2 6  --   --          Results from last 7 days   Lab Units 03/01/21  0550 02/28/21  0704 02/27/21  0620 02/26/21  0548 02/25/21  0535   SODIUM mmol/L 146* 149* 149* 149* 143   POTASSIUM mmol/L 3 0* 3 8 4 5 4 0 3 8   CHLORIDE mmol/L 113* 115* 117* 115* 112*   CO2 mmol/L 23 23 25 24 22   ANION GAP mmol/L 10 11 7 10 9   BUN mg/dL 30* 28* 27* 26* 28*   CREATININE mg/dL 1 34* 1 41* 1 35* 1 29 1 23   EGFR ml/min/1 73sq m 57 53 56 59 63   CALCIUM mg/dL 9 2 9 4 9 1 9 2 9 1         Results from last 7 days   Lab Units 03/01/21  1523 03/01/21  1054 03/01/21  0641 02/28/21  2056 02/28/21  1524 02/28/21  1040 02/28/21  0747 02/27/21  2051 02/27/21  1544 02/27/21  1122 02/27/21  0711 02/26/21 2028   POC GLUCOSE mg/dl 88 94 113 120 87 119 94 94 97 94 117 100     Results from last 7 days   Lab Units 03/01/21  0550 02/28/21  0704 02/27/21  0620 02/26/21  0548 02/25/21  0535 02/24/21  0606 02/23/21  0532   GLUCOSE RANDOM mg/dL 99 105 109 102 110 101 100                 Results from last 7 days   Lab Units 03/01/21  0550 02/25/21  0535   TOTAL COUNTED  100 100           Vital Signs:   Date/Time  Temp  Pulse  Resp  BP  MAP (mmHg)  SpO2  FiO2 (%)  Calculated FIO2 (%) - Nasal Cannula  O2 Flow Rate (L/min)  Nasal Cannula O2 Flow Rate (L/min)  O2 Device  O2 Interface Device  Patient Position - Orthostatic VS   02/26/21 1643  --  --  --  --  --  100 %  --  --  4 L/min  --  Nasal cannula  --  --   02/26/21 1500  98 2 °F (36 8 °C)  55  18  123/67  --  99 %  --  --  --  --  Nasal cannula  --  Lying   02/26/21 0756  --  --  --  --  --  97 %  --  --  5 L/min  --  Nasal cannula  --  --   02/26/21 0643  98 °F (36 7 °C)  99  19  115/75  --  99 %  --  --  --  --  --  --  Lying   02/26/21 0619  --  --  --  113/71  --  --  --  --  --  --  --  --  --         Medications:   Scheduled Medications:  apixaban, 2 5 mg, Oral, BID  aspirin, 81 mg, Oral, Daily  atorvastatin, 40 mg, Per NG Tube, HS  bumetanide, 2 mg, Oral, Daily  cholecalciferol, 2,000 Units, Per NG Tube, Daily  insulin lispro, 1-5 Units, Subcutaneous, HS  insulin lispro, 2-12 Units, Subcutaneous, TID AC  levETIRAcetam, 750 mg, Oral, Q12H ARACELIS  melatonin, 6 mg, Oral, HS  metoprolol tartrate, 25 mg, Oral, Q12H ARACELIS  midodrine, 10 mg, Oral, Q8H  multivitamin-minerals, 1 tablet, Oral, Daily  omeprazole (PRILOSEC) suspension 2 mg/mL, 20 mg, Oral, Q12H  potassium chloride, 40 mEq, Oral, TID With Meals  sertraline, 125 mg, Oral, Daily      Continuous IV Infusions:     PRN Meds:  acetaminophen, 650 mg, Oral, Q6H PRN  lidocaine, 1 patch, Topical, Daily PRN        Discharge Plan: tbd, medically stable awaiting placement-     Network Utilization Review Department  ATTENTION: Please call with any questions or concerns to [phone] and carefully listen to the prompts so that you are directed to the right person  All voicemails are confidential   Tgce Crystal all requests for admission clinical reviews, approved or denied determinations and any other requests to dedicated fax number below belonging to the campus where the patient is receiving treatment   List of dedicated fax numbers for the Facilities:  [address] DENIALS (Administrative/Medical Necessity) [phone]   [address] (Maternity/NICU/Pediatrics) [phone]   [address] Wally Sykes [address] ([address] [address] [facility]) [address] [place] [address] Radha Gallagher 1481 [address] [place] [address] [phone]

## 2021-03-02 LAB
ANION GAP SERPL CALCULATED.3IONS-SCNC: 11 MMOL/L (ref 4–13)
BUN SERPL-MCNC: 28 MG/DL (ref 5–25)
CALCIUM SERPL-MCNC: 8.8 MG/DL (ref 8.3–10.1)
CHLORIDE SERPL-SCNC: 113 MMOL/L (ref 100–108)
CO2 SERPL-SCNC: 20 MMOL/L (ref 21–32)
CREAT SERPL-MCNC: 1.35 MG/DL (ref 0.6–1.3)
GFR SERPL CREATININE-BSD FRML MDRD: 56 ML/MIN/1.73SQ M
GLUCOSE SERPL-MCNC: 106 MG/DL (ref 65–140)
GLUCOSE SERPL-MCNC: 118 MG/DL (ref 65–140)
GLUCOSE SERPL-MCNC: 84 MG/DL (ref 65–140)
GLUCOSE SERPL-MCNC: 87 MG/DL (ref 65–140)
POTASSIUM SERPL-SCNC: 3.1 MMOL/L (ref 3.5–5.3)
SODIUM SERPL-SCNC: 144 MMOL/L (ref 136–145)

## 2021-03-02 PROCEDURE — 80048 BASIC METABOLIC PNL TOTAL CA: CPT | Performed by: FAMILY MEDICINE

## 2021-03-02 PROCEDURE — 94760 N-INVAS EAR/PLS OXIMETRY 1: CPT

## 2021-03-02 PROCEDURE — 99232 SBSQ HOSP IP/OBS MODERATE 35: CPT | Performed by: FAMILY MEDICINE

## 2021-03-02 PROCEDURE — 82948 REAGENT STRIP/BLOOD GLUCOSE: CPT

## 2021-03-02 RX ORDER — PHENAZOPYRIDINE HYDROCHLORIDE 100 MG/1
100 TABLET, FILM COATED ORAL
Status: DISCONTINUED | OUTPATIENT
Start: 2021-03-02 | End: 2021-03-04 | Stop reason: HOSPADM

## 2021-03-02 RX ORDER — POTASSIUM CHLORIDE 14.9 MG/ML
20 INJECTION INTRAVENOUS
Status: COMPLETED | OUTPATIENT
Start: 2021-03-02 | End: 2021-03-02

## 2021-03-02 RX ORDER — POTASSIUM CHLORIDE 20 MEQ/1
20 TABLET, EXTENDED RELEASE ORAL ONCE
Status: COMPLETED | OUTPATIENT
Start: 2021-03-02 | End: 2021-03-02

## 2021-03-02 RX ADMIN — DESMOPRESSIN ACETATE 40 MG: 0.2 TABLET ORAL at 23:13

## 2021-03-02 RX ADMIN — Medication 1 TABLET: at 12:00

## 2021-03-02 RX ADMIN — POTASSIUM CHLORIDE 20 MEQ: 14.9 INJECTION, SOLUTION INTRAVENOUS at 10:01

## 2021-03-02 RX ADMIN — POTASSIUM CHLORIDE 40 MEQ: 1500 TABLET, EXTENDED RELEASE ORAL at 17:30

## 2021-03-02 RX ADMIN — MIDODRINE HYDROCHLORIDE 10 MG: 5 TABLET ORAL at 12:00

## 2021-03-02 RX ADMIN — POTASSIUM CHLORIDE 40 MEQ: 1500 TABLET, EXTENDED RELEASE ORAL at 12:29

## 2021-03-02 RX ADMIN — Medication 2000 UNITS: at 12:00

## 2021-03-02 RX ADMIN — METOPROLOL TARTRATE 25 MG: 25 TABLET, FILM COATED ORAL at 23:13

## 2021-03-02 RX ADMIN — MIDODRINE HYDROCHLORIDE 10 MG: 5 TABLET ORAL at 17:30

## 2021-03-02 RX ADMIN — POTASSIUM CHLORIDE 20 MEQ: 1500 TABLET, EXTENDED RELEASE ORAL at 12:00

## 2021-03-02 RX ADMIN — Medication 6 MG: at 23:13

## 2021-03-02 RX ADMIN — APIXABAN 2.5 MG: 2.5 TABLET, FILM COATED ORAL at 17:30

## 2021-03-02 RX ADMIN — ASPIRIN 81 MG CHEWABLE TABLET 81 MG: 81 TABLET CHEWABLE at 12:00

## 2021-03-02 RX ADMIN — MIDODRINE HYDROCHLORIDE 10 MG: 5 TABLET ORAL at 23:13

## 2021-03-02 RX ADMIN — LEVETIRACETAM 750 MG: 500 TABLET, FILM COATED ORAL at 23:13

## 2021-03-02 RX ADMIN — PHENAZOPYRIDINE 100 MG: 100 TABLET ORAL at 17:30

## 2021-03-02 RX ADMIN — LEVETIRACETAM 750 MG: 500 TABLET, FILM COATED ORAL at 12:00

## 2021-03-02 RX ADMIN — POTASSIUM CHLORIDE 20 MEQ: 14.9 INJECTION, SOLUTION INTRAVENOUS at 13:29

## 2021-03-02 RX ADMIN — BUMETANIDE 2 MG: 1 TABLET ORAL at 12:00

## 2021-03-02 RX ADMIN — APIXABAN 2.5 MG: 2.5 TABLET, FILM COATED ORAL at 12:00

## 2021-03-02 NOTE — PLAN OF CARE
Problem: Prexisting or High Potential for Compromised Skin Integrity  Goal: Skin integrity is maintained or improved  Description: INTERVENTIONS:  - Identify patients at risk for skin breakdown  - Assess and monitor skin integrity  - Assess and monitor nutrition and hydration status  - Monitor labs   - Assess for incontinence   - Turn and reposition patient  - Assist with mobility/ambulation  - Relieve pressure over bony prominences  - Avoid friction and shearing  - Provide appropriate hygiene as needed including keeping skin clean and dry  - Evaluate need for skin moisturizer/barrier cream  - Collaborate with interdisciplinary team   - Patient/family teaching  - Consider wound care consult   Outcome: Progressing     Problem: Potential for Falls  Goal: Patient will remain free of falls  Description: INTERVENTIONS:  - Assess patient frequently for physical needs  -  Identify cognitive and physical deficits and behaviors that affect risk of falls  -  [place] fall precautions as indicated by assessment   - Educate patient/family on patient safety including physical limitations  - Instruct patient to call for assistance with activity based on assessment  - Modify environment to reduce risk of injury  - Consider OT/PT consult to assist with strengthening/mobility  Outcome: Progressing     Problem: Nutrition/Hydration-ADULT  Goal: Nutrient/Hydration intake appropriate for improving, restoring or maintaining nutritional needs  Description: Monitor and assess patient's nutrition/hydration status for malnutrition  Collaborate with interdisciplinary team and initiate plan and interventions as ordered  Monitor patient's weight and dietary intake as ordered or per policy  Utilize nutrition screening tool and intervene as necessary  Determine patient's food preferences and provide high-protein, high-caloric foods as appropriate       INTERVENTIONS:  - Monitor oral intake, urinary output, labs, and treatment plans  - Assess nutrition and hydration status and recommend course of action  - Evaluate amount of meals eaten  - Assist patient with eating if necessary   - Allow adequate time for meals  - Recommend/ encourage appropriate diets, oral nutritional supplements, and vitamin/mineral supplements  - Order, calculate, and assess calorie counts as needed  - Recommend, monitor, and adjust tube feedings and TPN/PPN based on assessed needs  - Assess need for intravenous fluids  - Provide specific nutrition/hydration education as appropriate  - Include patient/family/caregiver in decisions related to nutrition  Outcome: Progressing     Problem: NEUROSENSORY - ADULT  Goal: Achieves stable or improved neurological status  Description: INTERVENTIONS  - Monitor and report changes in neurological status  - Monitor vital signs such as temperature, blood pressure, glucose, and any other labs ordered   - Initiate measures to prevent increased intracranial pressure  - Monitor for seizure activity and implement precautions if appropriate      Outcome: Progressing  Goal: Achieves maximal functionality and self care  Description: INTERVENTIONS  - Monitor swallowing and airway patency with patient fatigue and changes in neurological status  - Encourage and assist patient to increase activity and self care     - Encourage visually impaired, hearing impaired and aphasic patients to use assistive/communication devices  Outcome: Progressing     Problem: CARDIOVASCULAR - ADULT  Goal: Maintains optimal cardiac output and hemodynamic stability  Description: INTERVENTIONS:  - Monitor I/O, vital signs and rhythm  - Monitor for S/S and trends of decreased cardiac output  - Administer and titrate ordered vasoactive medications to optimize hemodynamic stability  - Assess quality of pulses, skin color and temperature  - Assess for signs of decreased coronary artery perfusion  - Instruct patient to report change in severity of symptoms  Outcome: Progressing  Goal: Absence of cardiac dysrhythmias or at baseline rhythm  Description: INTERVENTIONS:  - Continuous cardiac monitoring, vital signs, obtain 12 lead EKG if ordered  - Administer antiarrhythmic and heart rate control medications as ordered  - Monitor electrolytes and administer replacement therapy as ordered  Outcome: Progressing     Problem: RESPIRATORY - ADULT  Goal: Achieves optimal ventilation and oxygenation  Description: INTERVENTIONS:  - Assess for changes in respiratory status  - Assess for changes in mentation and behavior  - Position to facilitate oxygenation and minimize respiratory effort  - Oxygen administered by appropriate delivery if ordered  - Initiate smoking cessation education as indicated  - Encourage broncho-pulmonary hygiene including cough, deep breathe, Incentive Spirometry  - Assess the need for suctioning and aspirate as needed  - Assess and instruct to report SOB or any respiratory difficulty  - Respiratory Therapy support as indicated  Outcome: Progressing     Problem: GENITOURINARY - ADULT  Goal: Maintains or returns to baseline urinary function  Description: INTERVENTIONS:  - Assess urinary function  - Encourage oral fluids to ensure adequate hydration if ordered  - Administer IV fluids as ordered to ensure adequate hydration  - Administer ordered medications as needed  - Offer frequent toileting  - Follow urinary retention protocol if ordered  Outcome: Progressing  Goal: Absence of urinary retention  Description: INTERVENTIONS:  - Assess patients ability to void and empty bladder  - Monitor I/O  - Bladder scan as needed  - Discuss with physician/AP medications to alleviate retention as needed  - Discuss catheterization for long term situations as appropriate  Outcome: Progressing  Goal: Urinary catheter remains patent  Description: INTERVENTIONS:  - Assess patency of urinary catheter  - If patient has a chronic bo, consider changing catheter if non-functioning  - Follow guidelines for intermittent irrigation of non-functioning urinary catheter  Outcome: Progressing     Problem: METABOLIC, FLUID AND ELECTROLYTES - ADULT  Goal: Electrolytes maintained within normal limits  Description: INTERVENTIONS:  - Monitor labs and assess patient for signs and symptoms of electrolyte imbalances  - Administer electrolyte replacement as ordered  - Monitor response to electrolyte replacements, including repeat lab results as appropriate  - Instruct patient on fluid and nutrition as appropriate  Outcome: Progressing  Goal: Fluid balance maintained  Description: INTERVENTIONS:  - Monitor labs   - Monitor I/O and WT  - Instruct patient on fluid and nutrition as appropriate  - Assess for signs & symptoms of volume excess or deficit  Outcome: Progressing  Goal: Glucose maintained within target range  Description: INTERVENTIONS:  - Monitor Blood Glucose as ordered  - Assess for signs and symptoms of hyperglycemia and hypoglycemia  - Administer ordered medications to maintain glucose within target range  - Assess nutritional intake and initiate nutrition service referral as needed  Outcome: Progressing     Problem: MUSCULOSKELETAL - ADULT  Goal: Maintain or return mobility to safest level of function  Description: INTERVENTIONS:  - Assess patient's ability to carry out ADLs; assess patient's baseline for ADL function and identify physical deficits which impact ability to perform ADLs (bathing, care of mouth/teeth, toileting, grooming, dressing, etc )  - Assess/evaluate cause of self-care deficits   - Assess range of motion  - Assess patient's mobility  - Assess patient's need for assistive devices and provide as appropriate  - Encourage maximum independence but intervene and supervise when necessary  - Involve family in performance of ADLs  - Assess for home care needs following discharge   - Consider OT consult to assist with ADL evaluation and planning for discharge  - Provide patient education as appropriate  Outcome: Progressing  Goal: Maintain proper alignment of affected body part  Description: INTERVENTIONS:  - Support, maintain and protect limb and body alignment  - Provide patient/ family with appropriate education  Outcome: Progressing     Problem: INFECTION - ADULT  Goal: Absence or prevention of progression during hospitalization  Description: INTERVENTIONS:  - Assess and monitor for signs and symptoms of infection  - Monitor lab/diagnostic results  - Monitor all insertion sites, i e  indwelling lines, tubes, and drains  - Monitor endotracheal if appropriate and nasal secretions for changes in amount and color  - [place] appropriate cooling/warming therapies per order  - Administer medications as ordered  - Instruct and encourage patient and family to use good hand hygiene technique  - Identify and instruct in appropriate isolation precautions for identified infection/condition  Outcome: Progressing     Problem: DISCHARGE PLANNING  Goal: Discharge to home or other facility with appropriate resources  Description: INTERVENTIONS:  - Identify barriers to discharge w/patient and caregiver  - Arrange for needed discharge resources and transportation as appropriate  - Identify discharge learning needs (meds, wound care, etc )  - Arrange for interpretive services to assist at discharge as needed  - Refer to Case Management Department for coordinating discharge planning if the patient needs post-hospital services based on physician/advanced practitioner order or complex needs related to functional status, cognitive ability, or social support system  Outcome: Progressing     Problem: Knowledge Deficit  Goal: Patient/family/caregiver demonstrates understanding of disease process, treatment plan, medications, and discharge instructions  Description: Complete learning assessment and assess knowledge base    Interventions:  - Provide teaching at level of understanding  - Provide teaching via preferred learning methods  Outcome: Progressing     Problem: SAFETY,RESTRAINT: NV/NON-SELF DESTRUCTIVE BEHAVIOR  Goal: Remains free of harm/injury (restraint for non violent/non self-detsructive behavior)  Description: INTERVENTIONS:  - Instruct patient/family regarding restraint use   - Assess and monitor physiologic and psychological status   - Provide interventions and comfort measures to meet assessed patient needs   - Identify and implement measures to help patient regain control  - Assess readiness for release of restraint   Outcome: Progressing  Goal: Returns to optimal restraint-free functioning  Description: INTERVENTIONS:  - Assess the patient's behavior and symptoms that indicate continued need for restraint  - Identify and implement measures to help patient regain control  - Assess readiness for release of restraint   Outcome: Progressing

## 2021-03-02 NOTE — CASE MANAGEMENT
CM received the Provider Level of Care Determination Notice from [address] [place] [address] [facility], uploaded to patient's chosen SNF, awaiting response from Jennifer Shearer

## 2021-03-02 NOTE — CASE MANAGEMENT
MARCIA spoke to Saad Garza in admissions at [facility] at [phone] to check on status of acceptance  Saad Garza acknowledged receipt of the Level of Care Determination Notice from [address] [facility] and requests latest medical progress note  MARCIA faxed today's SLIM note to Saad Garza in admissions at [phone]  Saad Garza in admissions reports that the DON at this facility is reviewing

## 2021-03-02 NOTE — PROGRESS NOTES
Progress Note - Moises Putnam 1960, 63 y o  male MRN: [id number]    Unit/Bed#: S -01 Encounter: [id number]    Primary Care Provider: Yogi Gill DO   Date and time admitted to hospital: 1/17/2021  4:16 PM        COVID-19  Assessment & Plan  · Patient confirmed [id number] positive 01/16/2021  Initially presented to [address] with respiratory distress and altered mental status  While at [facility], was intubated due to hypercapnia and hypoxia  He became hypotensive requiring pressor therapy and was transferred to [facility] for further critical care management of severe COVID-19 infection  Intubated on 01/17 and was subsequently extubated on 01/18n  · Patient was severe COVID treatment pathway  · Actemra given 1/28  · S/p Conv Plasma 1/17  · Was not a candidate for Remdesivir  · Completed 31 days of Decadron on 2/15/21  · Completed 10 day course of Ertapenem  For ESBL and E coli pneumonia superimposed on COVID-19 pneumonia  · Respiratory status currently stable on 4 L nasal cannula O2 with sats 97% at rest            Acute kidney injury superimposed on CKD ([facility] [address] )  Assessment & Plan  · POA, likely multifactorial 2/2 ATN in setting of COVID-19 + contrast induced nephropathy + vanco toxicity  Baseline creatinine 1 0-1 3  · Seen by Nephrology during his hospital course  Renal function has improved , PATRICK resolved  · Currently on Bumex 2 mg daily  · Will hold Bumex dose tomorrow in view of rising creatinine levels with hypernatremia  · Continue to monitor renal function closely    History of stroke  Assessment & Plan  · History of stroke 1 year ago    Currently long-term  resident at nursing facility  · Continue aspirin, statin, Eliquis    Seizure [facility])  Assessment & Plan  · No prior history of seizure disorder  · Had a witnessed tonic-clonic seizure episode on 2/1/21 and was seen by Neurology  · EEG 2/2/21 Background activities are too slow suggesting moderate diffuse cerebral dysfunction of nonspecific etiology  · Continue seizure precautions, Keppra 750 mg b i d  Hypernatremia  Assessment & Plan  · Had been on D5W which was discontinued on 2/17/21  · Sodium level remains slightly elevated at 149 this morning  · Holding off on additional IV fluids at this time in view of edema and concerns for volume overload  · Will continue to monitor and encourage increased oral free water intake    Anemia  Assessment & Plan  ·  Hemoglobin has been stable  Most recent check was 9 3    Atrial fibrillation (HCC)  Assessment & Plan  · Rate is well controlled  Continue eliquis 2 5mg BID  · Cardizem was discontinued due to hypotension  · He remains on metoprolol 25mg BID    Depression  Assessment & Plan  ·  Continue Zoloft 125 mg daily    Morbid obesity   Assessment & Plan  · Continue work with PT/OT  · Recommend therapeutic lifestyle changes to promote weight loss    History of aortic valve replacement with bioprosthetic valve  Assessment & Plan  · Echo 1/2021 - EF 55%, bioprosthetic AV with gradient of 16  · No acute issues at this time  Anticoagulation not required    Hypokalemia  Assessment & Plan  · Hypokalemia to 3 1 this morning  · Replaced with IV and PO K+  · Continue to monitor    Hyperlipidemia  Assessment & Plan  · Continue statin    Essential hypertension  Assessment & Plan  · Has been hypotensive and on midodrine 10 mg q 8 hours  · Blood pressure stable and acceptable at this time          VTE Pharmacologic Prophylaxis:   Pharmacologic: Apixaban (Eliquis)  Mechanical VTE Prophylaxis in Place: Yes    Discussions with Specialists or Other Care Team Provider: Psych, Speech pathology  CM management following closely, per CM patient has been optioned and will require county to come and assess him  Education and Discussions with Family / Patient: Updated family (Spoke to wife- Matheus Kuhn) stating she does not need a phone call everyday   Wants to be informed of only medical updates/discharge planning  Current Length of Stay: 44 day(s)    Current Patient Status: Inpatient     Discharge Plan / Estimated Discharge Date: TBD- medically stable awaiting placement    Code Status: Level 1 - Full Code      Subjective:   Patient laying in bed comfortably  No complaints this morning     Objective:     Vitals:   Temp (24hrs), Av 4 °F (36 9 °C), Min:98 4 °F (36 9 °C), Max:98 4 °F (36 9 °C)    Temp:  [98 4 °F (36 9 °C)] 98 4 °F (36 9 °C)  HR:  [77-84] 84  Resp:  [18] 18  BP: ()/(50-52) 98/52  SpO2:  [93 %-100 %] 93 %  Body mass index is 48 kg/m²  Input and Output Summary (last 24 hours): Intake/Output Summary (Last 24 hours) at 3/2/2021 1225  Last data filed at 3/2/2021 1105  Gross per 24 hour   Intake 1320 ml   Output 1750 ml   Net -430 ml       Physical Exam:     Physical Exam  Constitutional:       Appearance: He is obese  HENT:      Head: Normocephalic and atraumatic  Right Ear: External ear normal       Left Ear: External ear normal       Nose: Nose normal    Eyes:      Conjunctiva/sclera: Conjunctivae normal    Cardiovascular:      Heart sounds: Normal heart sounds  Pulmonary:      Effort: Pulmonary effort is normal       Breath sounds: No rhonchi or rales  Comments: On 5L NC    Abdominal:      General: Bowel sounds are normal    Genitourinary:     Comments: Turner catheter in place  Rectal tube in place  Musculoskeletal:      Right lower leg: Edema present  Left lower leg: Edema present  Skin:     General: Skin is warm and dry  Neurological:      Mental Status: Mental status is at baseline     Psychiatric:         Mood and Affect: Mood normal        Additional Data:     Labs:    Results from last 7 days   Lab Units 21  0550  21  0606   WBC Thousand/uL 7 54   < > 7 56   HEMOGLOBIN g/dL 9 9*   < > 9 2*   HEMATOCRIT % 35 8*   < > 33 1*   PLATELETS Thousands/uL 398*   < > 282   NEUTROS PCT %  --   --  68   LYMPHS PCT %  --   --  9*   LYMPHO PCT % 10*   < > --    MONOS PCT %  --   --  9   MONO PCT % 15*   < >  --    EOS PCT % 10*   < > 11*    < > = values in this interval not displayed  Results from last 7 days   Lab Units 03/02/21  0509   POTASSIUM mmol/L 3 1*   CHLORIDE mmol/L 113*   CO2 mmol/L 20*   BUN mg/dL 28*   CREATININE mg/dL 1 35*   CALCIUM mg/dL 8 8           * I Have Reviewed All Lab Data Listed Above  * Additional Pertinent Lab Tests Reviewed:  All Labs Within Last 24 Hours Reviewed    Imaging:    Imaging Reports Reviewed Today Include: None  Imaging Personally Reviewed by Myself Includes:  As above    Recent Cultures (last 7 days):           Last 24 Hours Medication List:   Current Facility-Administered Medications   Medication Dose Route Frequency Provider Last Rate    acetaminophen  650 mg Oral Q6H PRN Lonzell Cheeks, DO      apixaban  2 5 mg Oral BID Lonzell Cheeks, DO      aspirin  81 mg Oral Daily Lonzell Cheeks, DO      atorvastatin  40 mg Per NG Tube HS Lonzell Cheeks, DO      bumetanide  2 mg Oral Daily Keyur Zapata MD      cholecalciferol  2,000 Units Per NG Tube Daily Lonzell Cheeks, DO      insulin lispro  1-5 Units Subcutaneous HS KATHY Alejandro      insulin lispro  2-12 Units Subcutaneous TID AC KATHY Leonard      levETIRAcetam  750 mg Oral Q12H [address] [place] [address] Street, DO      lidocaine  1 patch Topical Daily PRN Jareth Castillo MD      melatonin  6 mg Oral HS Lonzell Cheeks, DO      metoprolol tartrate  25 mg Oral Q12H [address] [place] [address] Street, DO      midodrine  10 mg Oral Q8H Lonzell Cheeks, DO      multivitamin-minerals  1 tablet Oral Daily Lonzell Cheeks, DO      omeprazole (PRILOSEC) suspension 2 mg/mL  20 mg Oral Q12H Lonzell Cheeks, DO      potassium chloride  20 mEq Oral Once Lei Quarles MD      potassium chloride  40 mEq Oral TID With Meals Rick Santana MD      potassium chloride  20 mEq Intravenous Q2H Lei Quarles MD 20 mEq (03/02/21 1001)    sertraline  125 mg Oral Daily Vita Gonzalez MD          Today, Patient Was Seen By: Radha Cage MD    ** Please Note: This note has been constructed using a voice recognition system   **

## 2021-03-02 NOTE — PLAN OF CARE
Problem: Prexisting or High Potential for Compromised Skin Integrity  Goal: Skin integrity is maintained or improved  Description: INTERVENTIONS:  - Identify patients at risk for skin breakdown  - Assess and monitor skin integrity  - Assess and monitor nutrition and hydration status  - Monitor labs   - Assess for incontinence   - Turn and reposition patient  - Assist with mobility/ambulation  - Relieve pressure over bony prominences  - Avoid friction and shearing  - Provide appropriate hygiene as needed including keeping skin clean and dry  - Evaluate need for skin moisturizer/barrier cream  - Collaborate with interdisciplinary team   - Patient/family teaching  - Consider wound care consult   Outcome: Progressing     Problem: Potential for Falls  Goal: Patient will remain free of falls  Description: INTERVENTIONS:  - Assess patient frequently for physical needs  -  Identify cognitive and physical deficits and behaviors that affect risk of falls  -  [place] fall precautions as indicated by assessment   - Educate patient/family on patient safety including physical limitations  - Instruct patient to call for assistance with activity based on assessment  - Modify environment to reduce risk of injury  - Consider OT/PT consult to assist with strengthening/mobility  Outcome: Progressing     Problem: Nutrition/Hydration-ADULT  Goal: Nutrient/Hydration intake appropriate for improving, restoring or maintaining nutritional needs  Description: Monitor and assess patient's nutrition/hydration status for malnutrition  Collaborate with interdisciplinary team and initiate plan and interventions as ordered  Monitor patient's weight and dietary intake as ordered or per policy  Utilize nutrition screening tool and intervene as necessary  Determine patient's food preferences and provide high-protein, high-caloric foods as appropriate       INTERVENTIONS:  - Monitor oral intake, urinary output, labs, and treatment plans  - Assess nutrition and hydration status and recommend course of action  - Evaluate amount of meals eaten  - Assist patient with eating if necessary   - Allow adequate time for meals  - Recommend/ encourage appropriate diets, oral nutritional supplements, and vitamin/mineral supplements  - Order, calculate, and assess calorie counts as needed  - Recommend, monitor, and adjust tube feedings and TPN/PPN based on assessed needs  - Assess need for intravenous fluids  - Provide specific nutrition/hydration education as appropriate  - Include patient/family/caregiver in decisions related to nutrition  Outcome: Progressing     Problem: NEUROSENSORY - ADULT  Goal: Achieves stable or improved neurological status  Description: INTERVENTIONS  - Monitor and report changes in neurological status  - Monitor vital signs such as temperature, blood pressure, glucose, and any other labs ordered   - Initiate measures to prevent increased intracranial pressure  - Monitor for seizure activity and implement precautions if appropriate      Outcome: Progressing  Goal: Achieves maximal functionality and self care  Description: INTERVENTIONS  - Monitor swallowing and airway patency with patient fatigue and changes in neurological status  - Encourage and assist patient to increase activity and self care     - Encourage visually impaired, hearing impaired and aphasic patients to use assistive/communication devices  Outcome: Progressing     Problem: CARDIOVASCULAR - ADULT  Goal: Maintains optimal cardiac output and hemodynamic stability  Description: INTERVENTIONS:  - Monitor I/O, vital signs and rhythm  - Monitor for S/S and trends of decreased cardiac output  - Administer and titrate ordered vasoactive medications to optimize hemodynamic stability  - Assess quality of pulses, skin color and temperature  - Assess for signs of decreased coronary artery perfusion  - Instruct patient to report change in severity of symptoms  Outcome: Progressing  Goal: Absence of cardiac dysrhythmias or at baseline rhythm  Description: INTERVENTIONS:  - Continuous cardiac monitoring, vital signs, obtain 12 lead EKG if ordered  - Administer antiarrhythmic and heart rate control medications as ordered  - Monitor electrolytes and administer replacement therapy as ordered  Outcome: Progressing     Problem: RESPIRATORY - ADULT  Goal: Achieves optimal ventilation and oxygenation  Description: INTERVENTIONS:  - Assess for changes in respiratory status  - Assess for changes in mentation and behavior  - Position to facilitate oxygenation and minimize respiratory effort  - Oxygen administered by appropriate delivery if ordered  - Initiate smoking cessation education as indicated  - Encourage broncho-pulmonary hygiene including cough, deep breathe, Incentive Spirometry  - Assess the need for suctioning and aspirate as needed  - Assess and instruct to report SOB or any respiratory difficulty  - Respiratory Therapy support as indicated  Outcome: Progressing     Problem: GENITOURINARY - ADULT  Goal: Maintains or returns to baseline urinary function  Description: INTERVENTIONS:  - Assess urinary function  - Encourage oral fluids to ensure adequate hydration if ordered  - Administer IV fluids as ordered to ensure adequate hydration  - Administer ordered medications as needed  - Offer frequent toileting  - Follow urinary retention protocol if ordered  Outcome: Progressing  Goal: Absence of urinary retention  Description: INTERVENTIONS:  - Assess patients ability to void and empty bladder  - Monitor I/O  - Bladder scan as needed  - Discuss with physician/AP medications to alleviate retention as needed  - Discuss catheterization for long term situations as appropriate  Outcome: Progressing  Goal: Urinary catheter remains patent  Description: INTERVENTIONS:  - Assess patency of urinary catheter  - If patient has a chronic bo, consider changing catheter if non-functioning  - Follow guidelines for intermittent irrigation of non-functioning urinary catheter  Outcome: Progressing     Problem: METABOLIC, FLUID AND ELECTROLYTES - ADULT  Goal: Electrolytes maintained within normal limits  Description: INTERVENTIONS:  - Monitor labs and assess patient for signs and symptoms of electrolyte imbalances  - Administer electrolyte replacement as ordered  - Monitor response to electrolyte replacements, including repeat lab results as appropriate  - Instruct patient on fluid and nutrition as appropriate  Outcome: Progressing  Goal: Fluid balance maintained  Description: INTERVENTIONS:  - Monitor labs   - Monitor I/O and WT  - Instruct patient on fluid and nutrition as appropriate  - Assess for signs & symptoms of volume excess or deficit  Outcome: Progressing  Goal: Glucose maintained within target range  Description: INTERVENTIONS:  - Monitor Blood Glucose as ordered  - Assess for signs and symptoms of hyperglycemia and hypoglycemia  - Administer ordered medications to maintain glucose within target range  - Assess nutritional intake and initiate nutrition service referral as needed  Outcome: Progressing     Problem: MUSCULOSKELETAL - ADULT  Goal: Maintain or return mobility to safest level of function  Description: INTERVENTIONS:  - Assess patient's ability to carry out ADLs; assess patient's baseline for ADL function and identify physical deficits which impact ability to perform ADLs (bathing, care of mouth/teeth, toileting, grooming, dressing, etc )  - Assess/evaluate cause of self-care deficits   - Assess range of motion  - Assess patient's mobility  - Assess patient's need for assistive devices and provide as appropriate  - Encourage maximum independence but intervene and supervise when necessary  - Involve family in performance of ADLs  - Assess for home care needs following discharge   - Consider OT consult to assist with ADL evaluation and planning for discharge  - Provide patient education as appropriate  Outcome: Progressing  Goal: Maintain proper alignment of affected body part  Description: INTERVENTIONS:  - Support, maintain and protect limb and body alignment  - Provide patient/ family with appropriate education  Outcome: Progressing     Problem: INFECTION - ADULT  Goal: Absence or prevention of progression during hospitalization  Description: INTERVENTIONS:  - Assess and monitor for signs and symptoms of infection  - Monitor lab/diagnostic results  - Monitor all insertion sites, i e  indwelling lines, tubes, and drains  - Monitor endotracheal if appropriate and nasal secretions for changes in amount and color  - [place] appropriate cooling/warming therapies per order  - Administer medications as ordered  - Instruct and encourage patient and family to use good hand hygiene technique  - Identify and instruct in appropriate isolation precautions for identified infection/condition  Outcome: Progressing     Problem: DISCHARGE PLANNING  Goal: Discharge to home or other facility with appropriate resources  Description: INTERVENTIONS:  - Identify barriers to discharge w/patient and caregiver  - Arrange for needed discharge resources and transportation as appropriate  - Identify discharge learning needs (meds, wound care, etc )  - Arrange for interpretive services to assist at discharge as needed  - Refer to Case Management Department for coordinating discharge planning if the patient needs post-hospital services based on physician/advanced practitioner order or complex needs related to functional status, cognitive ability, or social support system  Outcome: Progressing     Problem: Knowledge Deficit  Goal: Patient/family/caregiver demonstrates understanding of disease process, treatment plan, medications, and discharge instructions  Description: Complete learning assessment and assess knowledge base    Interventions:  - Provide teaching at level of understanding  - Provide teaching via preferred learning methods  Outcome: Progressing     Problem: SAFETY,RESTRAINT: NV/NON-SELF DESTRUCTIVE BEHAVIOR  Goal: Remains free of harm/injury (restraint for non violent/non self-detsructive behavior)  Description: INTERVENTIONS:  - Instruct patient/family regarding restraint use   - Assess and monitor physiologic and psychological status   - Provide interventions and comfort measures to meet assessed patient needs   - Identify and implement measures to help patient regain control  - Assess readiness for release of restraint   Outcome: Progressing  Goal: Returns to optimal restraint-free functioning  Description: INTERVENTIONS:  - Assess the patient's behavior and symptoms that indicate continued need for restraint  - Identify and implement measures to help patient regain control  - Assess readiness for release of restraint   Outcome: Progressing

## 2021-03-03 LAB
ANION GAP SERPL CALCULATED.3IONS-SCNC: 13 MMOL/L (ref 4–13)
BUN SERPL-MCNC: 26 MG/DL (ref 5–25)
CALCIUM SERPL-MCNC: 9.2 MG/DL (ref 8.3–10.1)
CHLORIDE SERPL-SCNC: 115 MMOL/L (ref 100–108)
CO2 SERPL-SCNC: 18 MMOL/L (ref 21–32)
CREAT SERPL-MCNC: 1.32 MG/DL (ref 0.6–1.3)
GFR SERPL CREATININE-BSD FRML MDRD: 58 ML/MIN/1.73SQ M
GLUCOSE SERPL-MCNC: 83 MG/DL (ref 65–140)
GLUCOSE SERPL-MCNC: 87 MG/DL (ref 65–140)
GLUCOSE SERPL-MCNC: 88 MG/DL (ref 65–140)
GLUCOSE SERPL-MCNC: 88 MG/DL (ref 65–140)
GLUCOSE SERPL-MCNC: 92 MG/DL (ref 65–140)
GLUCOSE SERPL-MCNC: 95 MG/DL (ref 65–140)
POTASSIUM SERPL-SCNC: 3.4 MMOL/L (ref 3.5–5.3)
SODIUM SERPL-SCNC: 146 MMOL/L (ref 136–145)

## 2021-03-03 PROCEDURE — 80048 BASIC METABOLIC PNL TOTAL CA: CPT | Performed by: FAMILY MEDICINE

## 2021-03-03 PROCEDURE — 99233 SBSQ HOSP IP/OBS HIGH 50: CPT | Performed by: FAMILY MEDICINE

## 2021-03-03 PROCEDURE — 82948 REAGENT STRIP/BLOOD GLUCOSE: CPT

## 2021-03-03 PROCEDURE — 94760 N-INVAS EAR/PLS OXIMETRY 1: CPT

## 2021-03-03 PROCEDURE — 94660 CPAP INITIATION&MGMT: CPT

## 2021-03-03 RX ORDER — DIPHENHYDRAMINE HCL 25 MG
25 TABLET ORAL EVERY 6 HOURS PRN
Status: DISCONTINUED | OUTPATIENT
Start: 2021-03-03 | End: 2021-03-04 | Stop reason: HOSPADM

## 2021-03-03 RX ORDER — POTASSIUM CHLORIDE 20 MEQ/1
20 TABLET, EXTENDED RELEASE ORAL ONCE
Status: COMPLETED | OUTPATIENT
Start: 2021-03-03 | End: 2021-03-03

## 2021-03-03 RX ADMIN — POTASSIUM CHLORIDE 40 MEQ: 1500 TABLET, EXTENDED RELEASE ORAL at 07:47

## 2021-03-03 RX ADMIN — METOPROLOL TARTRATE 25 MG: 25 TABLET, FILM COATED ORAL at 08:27

## 2021-03-03 RX ADMIN — MIDODRINE HYDROCHLORIDE 10 MG: 5 TABLET ORAL at 08:27

## 2021-03-03 RX ADMIN — APIXABAN 2.5 MG: 2.5 TABLET, FILM COATED ORAL at 17:17

## 2021-03-03 RX ADMIN — POTASSIUM CHLORIDE 40 MEQ: 1500 TABLET, EXTENDED RELEASE ORAL at 13:00

## 2021-03-03 RX ADMIN — APIXABAN 2.5 MG: 2.5 TABLET, FILM COATED ORAL at 08:28

## 2021-03-03 RX ADMIN — LEVETIRACETAM 750 MG: 500 TABLET, FILM COATED ORAL at 22:13

## 2021-03-03 RX ADMIN — ASPIRIN 81 MG CHEWABLE TABLET 81 MG: 81 TABLET CHEWABLE at 08:27

## 2021-03-03 RX ADMIN — PHENAZOPYRIDINE 100 MG: 100 TABLET ORAL at 13:00

## 2021-03-03 RX ADMIN — Medication 20 MG: at 07:47

## 2021-03-03 RX ADMIN — BUMETANIDE 2 MG: 1 TABLET ORAL at 08:27

## 2021-03-03 RX ADMIN — DESMOPRESSIN ACETATE 40 MG: 0.2 TABLET ORAL at 22:13

## 2021-03-03 RX ADMIN — PHENAZOPYRIDINE 100 MG: 100 TABLET ORAL at 17:17

## 2021-03-03 RX ADMIN — PHENAZOPYRIDINE 100 MG: 100 TABLET ORAL at 07:47

## 2021-03-03 RX ADMIN — Medication 20 MG: at 19:32

## 2021-03-03 RX ADMIN — POTASSIUM CHLORIDE 40 MEQ: 1500 TABLET, EXTENDED RELEASE ORAL at 17:17

## 2021-03-03 RX ADMIN — SERTRALINE 125 MG: 25 TABLET, FILM COATED ORAL at 08:27

## 2021-03-03 RX ADMIN — MIDODRINE HYDROCHLORIDE 10 MG: 5 TABLET ORAL at 17:17

## 2021-03-03 RX ADMIN — POTASSIUM CHLORIDE 20 MEQ: 1500 TABLET, EXTENDED RELEASE ORAL at 08:27

## 2021-03-03 RX ADMIN — Medication 6 MG: at 22:14

## 2021-03-03 RX ADMIN — LEVETIRACETAM 750 MG: 500 TABLET, FILM COATED ORAL at 08:27

## 2021-03-03 NOTE — PLAN OF CARE
Problem: Prexisting or High Potential for Compromised Skin Integrity  Goal: Skin integrity is maintained or improved  Description: INTERVENTIONS:  - Identify patients at risk for skin breakdown  - Assess and monitor skin integrity  - Assess and monitor nutrition and hydration status  - Monitor labs   - Assess for incontinence   - Turn and reposition patient  - Assist with mobility/ambulation  - Relieve pressure over bony prominences  - Avoid friction and shearing  - Provide appropriate hygiene as needed including keeping skin clean and dry  - Evaluate need for skin moisturizer/barrier cream  - Collaborate with interdisciplinary team   - Patient/family teaching  - Consider wound care consult   Outcome: Progressing     Problem: Potential for Falls  Goal: Patient will remain free of falls  Description: INTERVENTIONS:  - Assess patient frequently for physical needs  -  Identify cognitive and physical deficits and behaviors that affect risk of falls  -  [place] fall precautions as indicated by assessment   - Educate patient/family on patient safety including physical limitations  - Instruct patient to call for assistance with activity based on assessment  - Modify environment to reduce risk of injury  - Consider OT/PT consult to assist with strengthening/mobility  Outcome: Progressing     Problem: Nutrition/Hydration-ADULT  Goal: Nutrient/Hydration intake appropriate for improving, restoring or maintaining nutritional needs  Description: Monitor and assess patient's nutrition/hydration status for malnutrition  Collaborate with interdisciplinary team and initiate plan and interventions as ordered  Monitor patient's weight and dietary intake as ordered or per policy  Utilize nutrition screening tool and intervene as necessary  Determine patient's food preferences and provide high-protein, high-caloric foods as appropriate       INTERVENTIONS:  - Monitor oral intake, urinary output, labs, and treatment plans  - Assess nutrition and hydration status and recommend course of action  - Evaluate amount of meals eaten  - Assist patient with eating if necessary   - Allow adequate time for meals  - Recommend/ encourage appropriate diets, oral nutritional supplements, and vitamin/mineral supplements  - Order, calculate, and assess calorie counts as needed  - Recommend, monitor, and adjust tube feedings and TPN/PPN based on assessed needs  - Assess need for intravenous fluids  - Provide specific nutrition/hydration education as appropriate  - Include patient/family/caregiver in decisions related to nutrition  Outcome: Progressing     Problem: NEUROSENSORY - ADULT  Goal: Achieves stable or improved neurological status  Description: INTERVENTIONS  - Monitor and report changes in neurological status  - Monitor vital signs such as temperature, blood pressure, glucose, and any other labs ordered   - Initiate measures to prevent increased intracranial pressure  - Monitor for seizure activity and implement precautions if appropriate      Outcome: Progressing  Goal: Achieves maximal functionality and self care  Description: INTERVENTIONS  - Monitor swallowing and airway patency with patient fatigue and changes in neurological status  - Encourage and assist patient to increase activity and self care     - Encourage visually impaired, hearing impaired and aphasic patients to use assistive/communication devices  Outcome: Progressing     Problem: CARDIOVASCULAR - ADULT  Goal: Maintains optimal cardiac output and hemodynamic stability  Description: INTERVENTIONS:  - Monitor I/O, vital signs and rhythm  - Monitor for S/S and trends of decreased cardiac output  - Administer and titrate ordered vasoactive medications to optimize hemodynamic stability  - Assess quality of pulses, skin color and temperature  - Assess for signs of decreased coronary artery perfusion  - Instruct patient to report change in severity of symptoms  Outcome: Progressing  Goal: Absence of cardiac dysrhythmias or at baseline rhythm  Description: INTERVENTIONS:  - Continuous cardiac monitoring, vital signs, obtain 12 lead EKG if ordered  - Administer antiarrhythmic and heart rate control medications as ordered  - Monitor electrolytes and administer replacement therapy as ordered  Outcome: Progressing     Problem: RESPIRATORY - ADULT  Goal: Achieves optimal ventilation and oxygenation  Description: INTERVENTIONS:  - Assess for changes in respiratory status  - Assess for changes in mentation and behavior  - Position to facilitate oxygenation and minimize respiratory effort  - Oxygen administered by appropriate delivery if ordered  - Initiate smoking cessation education as indicated  - Encourage broncho-pulmonary hygiene including cough, deep breathe, Incentive Spirometry  - Assess the need for suctioning and aspirate as needed  - Assess and instruct to report SOB or any respiratory difficulty  - Respiratory Therapy support as indicated  Outcome: Progressing     Problem: GENITOURINARY - ADULT  Goal: Maintains or returns to baseline urinary function  Description: INTERVENTIONS:  - Assess urinary function  - Encourage oral fluids to ensure adequate hydration if ordered  - Administer IV fluids as ordered to ensure adequate hydration  - Administer ordered medications as needed  - Offer frequent toileting  - Follow urinary retention protocol if ordered  Outcome: Progressing  Goal: Absence of urinary retention  Description: INTERVENTIONS:  - Assess patients ability to void and empty bladder  - Monitor I/O  - Bladder scan as needed  - Discuss with physician/AP medications to alleviate retention as needed  - Discuss catheterization for long term situations as appropriate  Outcome: Progressing  Goal: Urinary catheter remains patent  Description: INTERVENTIONS:  - Assess patency of urinary catheter  - If patient has a chronic bo, consider changing catheter if non-functioning  - Follow guidelines for intermittent irrigation of non-functioning urinary catheter  Outcome: Progressing     Problem: METABOLIC, FLUID AND ELECTROLYTES - ADULT  Goal: Electrolytes maintained within normal limits  Description: INTERVENTIONS:  - Monitor labs and assess patient for signs and symptoms of electrolyte imbalances  - Administer electrolyte replacement as ordered  - Monitor response to electrolyte replacements, including repeat lab results as appropriate  - Instruct patient on fluid and nutrition as appropriate  Outcome: Progressing  Goal: Fluid balance maintained  Description: INTERVENTIONS:  - Monitor labs   - Monitor I/O and WT  - Instruct patient on fluid and nutrition as appropriate  - Assess for signs & symptoms of volume excess or deficit  Outcome: Progressing  Goal: Glucose maintained within target range  Description: INTERVENTIONS:  - Monitor Blood Glucose as ordered  - Assess for signs and symptoms of hyperglycemia and hypoglycemia  - Administer ordered medications to maintain glucose within target range  - Assess nutritional intake and initiate nutrition service referral as needed  Outcome: Progressing     Problem: MUSCULOSKELETAL - ADULT  Goal: Maintain or return mobility to safest level of function  Description: INTERVENTIONS:  - Assess patient's ability to carry out ADLs; assess patient's baseline for ADL function and identify physical deficits which impact ability to perform ADLs (bathing, care of mouth/teeth, toileting, grooming, dressing, etc )  - Assess/evaluate cause of self-care deficits   - Assess range of motion  - Assess patient's mobility  - Assess patient's need for assistive devices and provide as appropriate  - Encourage maximum independence but intervene and supervise when necessary  - Involve family in performance of ADLs  - Assess for home care needs following discharge   - Consider OT consult to assist with ADL evaluation and planning for discharge  - Provide patient education as appropriate  Outcome: Progressing  Goal: Maintain proper alignment of affected body part  Description: INTERVENTIONS:  - Support, maintain and protect limb and body alignment  - Provide patient/ family with appropriate education  Outcome: Progressing     Problem: INFECTION - ADULT  Goal: Absence or prevention of progression during hospitalization  Description: INTERVENTIONS:  - Assess and monitor for signs and symptoms of infection  - Monitor lab/diagnostic results  - Monitor all insertion sites, i e  indwelling lines, tubes, and drains  - Monitor endotracheal if appropriate and nasal secretions for changes in amount and color  - Sanderson appropriate cooling/warming therapies per order  - Administer medications as ordered  - Instruct and encourage patient and family to use good hand hygiene technique  - Identify and instruct in appropriate isolation precautions for identified infection/condition  Outcome: Progressing     Problem: DISCHARGE PLANNING  Goal: Discharge to home or other facility with appropriate resources  Description: INTERVENTIONS:  - Identify barriers to discharge w/patient and caregiver  - Arrange for needed discharge resources and transportation as appropriate  - Identify discharge learning needs (meds, wound care, etc )  - Arrange for interpretive services to assist at discharge as needed  - Refer to Case Management Department for coordinating discharge planning if the patient needs post-hospital services based on physician/advanced practitioner order or complex needs related to functional status, cognitive ability, or social support system  Outcome: Progressing     Problem: Knowledge Deficit  Goal: Patient/family/caregiver demonstrates understanding of disease process, treatment plan, medications, and discharge instructions  Description: Complete learning assessment and assess knowledge base    Interventions:  - Provide teaching at level of understanding  - Provide teaching via preferred learning methods  Outcome: Progressing     Problem: SAFETY,RESTRAINT: NV/NON-SELF DESTRUCTIVE BEHAVIOR  Goal: Remains free of harm/injury (restraint for non violent/non self-detsructive behavior)  Description: INTERVENTIONS:  - Instruct patient/family regarding restraint use   - Assess and monitor physiologic and psychological status   - Provide interventions and comfort measures to meet assessed patient needs   - Identify and implement measures to help patient regain control  - Assess readiness for release of restraint   Outcome: Progressing

## 2021-03-03 NOTE — UTILIZATION REVIEW
Continued Stay Review    Date: 3/3/21                          Current Patient Class: inpatient  Current Level of Care: med surg since 2/19    HPI:60 y o  male w/hx stroke, htn initially admitted on 1/17/21 as inpatient due to COVID pneumonia and hypercapnic resp failure, intubated, then extubated on 1/18  IV antbx started for superimposed bacterial pna and positive blood cultures  He received several antbx courses and briefly was transferred back to critical care for altered mental status and hypercapnea  Guarded prognosis due to multiple comorbidities, body habitus, and respiratory status  He is undergoing the option process for long term care placement  Assessment/Plan: 3/3: today he was encephalopathic in the am, could have been from not using bipap, but co2 unremarkable  Receiving k replacement  Throughout the day, he improved and was more coherent  He then developed a diffuse itchy rash that started on thighs, and is now on his abdomen  No new meds or skin contacts  Could be contact dermatitis or folliculitis, could also be a petechial component  Checking another cbc in am to ensure platelets and other parameters are stable  Considering dermatology evaluation       Pertinent Labs/Diagnostic Results:      no new rads or EKG  Results from last 7 days   Lab Units 03/01/21  0550 02/25/21  0535   WBC Thousand/uL 7 54 7 39   HEMOGLOBIN g/dL 9 9* 9 3*   HEMATOCRIT % 35 8* 34 5*   PLATELETS Thousands/uL 398* 325   BANDS PCT % 2 6         Results from last 7 days   Lab Units 03/03/21  0514 03/02/21  0509 03/01/21  0550 02/28/21  0704 02/27/21  0620   SODIUM mmol/L 146* 144 146* 149* 149*   POTASSIUM mmol/L 3 4* 3 1* 3 0* 3 8 4 5   CHLORIDE mmol/L 115* 113* 113* 115* 117*   CO2 mmol/L 18* 20* 23 23 25   ANION GAP mmol/L 13 11 10 11 7   BUN mg/dL 26* 28* 30* 28* 27*   CREATININE mg/dL 1 32* 1 35* 1 34* 1 41* 1 35*   EGFR ml/min/1 73sq m 58 56 57 53 56   CALCIUM mg/dL 9 2 8 8 9 2 9 4 9 1         Results from last 7 days   Lab Units 03/02/21  1505 03/02/21  1045 03/02/21  0644 03/01/21 2059 03/01/21  1523 03/01/21  1054 03/01/21  0641 02/28/21 2056 02/28/21  1524 02/28/21  1040 02/28/21  0747 02/27/21 2051   POC GLUCOSE mg/dl 118 106 84 88 88 94 113 120 87 119 94 94     Results from last 7 days   Lab Units 03/03/21  0514 03/02/21  0509 03/01/21  0550 02/28/21  0704 02/27/21  0620 02/26/21  0548 02/25/21  0535   GLUCOSE RANDOM mg/dL 95 87 99 105 109 102 110           Results from last 7 days   Lab Units 03/01/21  0550 02/25/21  0535   TOTAL COUNTED  100 100           Vital Signs:   Date/Time  Temp  Pulse  Resp  BP  MAP (mmHg)  SpO2    O2 Flow Rate (L/min)  Nasal Cannula O2 Flow Rate (L/min)  O2 Device     03/03/21 1500  97 6 °F (36 4 °C)  104  18  108/61  79  91 %    --  --  None (Room air)     03/03/21 1113  --  --  --  --  --  96 %    --  3 L/min  Nasal cannula     03/03/21 0959  --  --  --  --  --  94 %    3 L/min  --  BiPAP     03/03/21 0646  98 2 °F (36 8 °C)  87  19  125/86  --  95 %    --  --  --     03/03/21 0545  --  --  --  --  --  --    --  2 L/min  Nasal cannula     03/02/21 2210  98 2 °F (36 8 °C)  97  18  127/80  --  94 %    --  --  Nasal cannula           Medications:   Scheduled Medications:  apixaban, 2 5 mg, Oral, BID  aspirin, 81 mg, Oral, Daily  atorvastatin, 40 mg, Per NG Tube, HS  bumetanide, 2 mg, Oral, Daily  cholecalciferol, 2,000 Units, Per NG Tube, Daily  insulin lispro, 1-5 Units, Subcutaneous, HS  insulin lispro, 2-12 Units, Subcutaneous, TID AC  levETIRAcetam, 750 mg, Oral, Q12H [address]  melatonin, 6 mg, Oral, HS  metoprolol tartrate, 25 mg, Oral, Q12H ARACELIS  midodrine, 10 mg, Oral, Q8H  multivitamin-minerals, 1 tablet, Oral, Daily  omeprazole (PRILOSEC) suspension 2 mg/mL, 20 mg, Oral, Q12H  phenazopyridine, 100 mg, Oral, TID With Meals  potassium chloride, 40 mEq, Oral, TID With Meals  sertraline, 125 mg, Oral, Daily  IV kdur x 2 3/1, x 2 3/2    Continuous IV Infusions:none     PRN Meds:  acetaminophen, 650 mg, Oral, Q6H PRN  diphenhydrAMINE, 25 mg, Oral, Q6H PRN  lidocaine, 1 patch, Topical, Daily PRN        Discharge Plan: D    Network Utilization Review Department  ATTENTION: Please call with any questions or concerns to [phone] and carefully listen to the prompts so that you are directed to the right person  All voicemails are confidential   Vimal Patterson all requests for admission clinical reviews, approved or denied determinations and any other requests to dedicated fax number below belonging to the campus where the patient is receiving treatment   List of dedicated fax numbers for the Facilities:  [address] DENIALS (Administrative/Medical Necessity) [phone]   [address] (Maternity/NICU/Pediatrics) [phone]   [address] [place] [address] [address] Wally [address]kes [address] ([address]) [address] [place] [address] Radha Gallagher 1481 [address] [place] [address] [phone]

## 2021-03-03 NOTE — PROGRESS NOTES
Progress Note - Danny Patel 1960, 63 y o  male MRN: [id number]    Unit/Bed#: S -01 Encounter: [id number]    Primary Care Provider: Trish Enamorado DO   Date and time admitted to hospital: 1/17/2021  4:16 PM        COVID-19  Assessment & Plan  · Patient confirmed [id number] positive 01/16/2021  Initially presented to [address] [place] [address] with respiratory distress and altered mental status  While at [facility], was intubated due to hypercapnia and hypoxia  He became hypotensive requiring pressor therapy and was transferred to [facility] for further critical care management of severe COVID-19 infection  Intubated on 01/17 and was subsequently extubated on 01/18n  · Patient was severe COVID treatment pathway  · Actemra given 1/28  · S/p Conv Plasma 1/17  · Was not a candidate for Remdesivir  · Completed 31 days of Decadron on 2/15/21  · Completed 10 day course of Ertapenem  For ESBL and E coli pneumonia superimposed on COVID-19 pneumonia  · Respiratory status currently stable on 4 L nasal cannula O2 with sats 97% at rest            Acute kidney injury superimposed on CKD ([facility] [address] )  Assessment & Plan  · POA, likely multifactorial 2/2 ATN in setting of COVID-19 + contrast induced nephropathy + vanco toxicity  Baseline creatinine 1 0-1 3  · Seen by Nephrology during his hospital course  Renal function has improved , PATRICK resolved  · Currently on Bumex 2 mg daily  · Will hold Bumex dose tomorrow in view of rising creatinine levels with hypernatremia  · Continue to monitor renal function closely    History of stroke  Assessment & Plan  · History of stroke 1 year ago    Currently long-term  resident at nursing facility  · Continue aspirin, statin, Eliquis    Seizure [facility])  Assessment & Plan  · No prior history of seizure disorder  · Had a witnessed tonic-clonic seizure episode on 2/1/21 and was seen by Neurology  · EEG 2/2/21 Background activities are too slow suggesting moderate diffuse cerebral dysfunction of nonspecific etiology  · Continue seizure precautions, Keppra 750 mg b i d  Hypernatremia  Assessment & Plan  · Had been on D5W which was discontinued on 2/17/21  · Sodium level remains slightly elevated at 149 this morning  · Holding off on additional IV fluids at this time in view of edema and concerns for volume overload  · Will continue to monitor and encourage increased oral free water intake    Anemia  Assessment & Plan  ·  Hemoglobin has been stable  Most recent check was 9 3    Atrial fibrillation (HCC)  Assessment & Plan  · Rate is well controlled  Continue eliquis 2 5mg BID  · Cardizem was discontinued due to hypotension  · He remains on metoprolol 25mg BID    Depression  Assessment & Plan  ·  Continue Zoloft 125 mg daily    Morbid obesity   Assessment & Plan  · Continue work with PT/OT  · Recommend therapeutic lifestyle changes to promote weight loss    History of aortic valve replacement with bioprosthetic valve  Assessment & Plan  · Echo 1/2021 - EF 55%, bioprosthetic AV with gradient of 16  · No acute issues at this time  Anticoagulation not required    Hypokalemia  Assessment & Plan  · Hypokalemia improving, up to 3 4 this morning  · Replaced with IV and PO K+  · Continue to monitor    Hyperlipidemia  Assessment & Plan  · Continue statin    Essential hypertension  Assessment & Plan  · Has been hypotensive and on midodrine 10 mg q 8 hours  · Blood pressure stable and acceptable at this time          VTE Pharmacologic Prophylaxis:   Pharmacologic: Apixaban (Eliquis)  Mechanical VTE Prophylaxis in Place: Yes    Discussions with Specialists or Other Care Team Provider: Psych, Speech pathology  CM management following closely, per CM patient has been optioned and will require county to come and assess him  Education and Discussions with Family / Patient: Updated family (Spoke to wife- Kim Brambila) stating she does not need a phone call everyday   Wants to be informed of only medical updates/discharge planning  Current Length of Stay: 45 day(s)    Current Patient Status: Inpatient     Discharge Plan / Estimated Discharge Date: Patient was scheduled for placement this afternoon, however, this was cancelled due to change in mentation noted this morning by care team  CM aware and has canceled transport and notified the facility  Code Status: Level 1 - Full Code      Subjective:   Patient laying able to carry conversation but was notably more confused from baseline this morning  Patient able to answer directed question regarding year and month  Per nursing, did report that unfortunately patient did not have BiPAP overnight which may be contributing to mentation change  Objective:     Vitals:   Temp (24hrs), Av 3 °F (36 8 °C), Min:98 2 °F (36 8 °C), Max:98 4 °F (36 9 °C)    Temp:  [98 2 °F (36 8 °C)-98 4 °F (36 9 °C)] 98 2 °F (36 8 °C)  HR:  [] 87  Resp:  [18-19] 19  BP: (115-127)/(66-86) 125/86  SpO2:  [94 %-96 %] 96 %  Body mass index is 47 69 kg/m²  Input and Output Summary (last 24 hours): Intake/Output Summary (Last 24 hours) at 3/3/2021 1244  Last data filed at 3/3/2021 0959  Gross per 24 hour   Intake 480 ml   Output 3400 ml   Net -2920 ml       Physical Exam:     Physical Exam  Constitutional:       Appearance: He is obese  He is not ill-appearing  HENT:      Head: Normocephalic and atraumatic  Right Ear: External ear normal       Left Ear: External ear normal       Nose: Nose normal    Eyes:      Conjunctiva/sclera: Conjunctivae normal    Cardiovascular:      Heart sounds: Normal heart sounds  Pulmonary:      Effort: Pulmonary effort is normal       Breath sounds: No rhonchi or rales  Comments: On 5L NC    Abdominal:      General: Bowel sounds are normal    Genitourinary:     Comments: Turner catheter in place  Rectal tube in place  Musculoskeletal:      Right lower leg: Edema present  Left lower leg: Edema present  Skin:     General: Skin is warm and dry  Neurological:      Mental Status: Mental status is at baseline  Psychiatric:         Mood and Affect: Mood normal        Additional Data:     Labs:    Results from last 7 days   Lab Units 03/01/21  0550   WBC Thousand/uL 7 54   HEMOGLOBIN g/dL 9 9*   HEMATOCRIT % 35 8*   PLATELETS Thousands/uL 398*   LYMPHO PCT % 10*   MONO PCT % 15*   EOS PCT % 10*     Results from last 7 days   Lab Units 03/03/21  0514   POTASSIUM mmol/L 3 4*   CHLORIDE mmol/L 115*   CO2 mmol/L 18*   BUN mg/dL 26*   CREATININE mg/dL 1 32*   CALCIUM mg/dL 9 2           * I Have Reviewed All Lab Data Listed Above  * Additional Pertinent Lab Tests Reviewed:  All Labs Within Last 24 Hours Reviewed    Imaging:    Imaging Reports Reviewed Today Include: None  Imaging Personally Reviewed by Myself Includes:  As above    Recent Cultures (last 7 days):           Last 24 Hours Medication List:   Current Facility-Administered Medications   Medication Dose Route Frequency Provider Last Rate    acetaminophen  650 mg Oral Q6H PRN Serene Center, DO      apixaban  2 5 mg Oral BID Serene Troy, DO      aspirin  81 mg Oral Daily Serene Troy, DO      atorvastatin  40 mg Per NG Tube HS Serene Troy, DO      bumetanide  2 mg Oral Daily Pricilla Lombardi MD      cholecalciferol  2,000 Units Per NG Tube Daily [facility]      insulin lispro  1-5 Units Subcutaneous HS KATHY Ca      insulin lispro  2-12 Units Subcutaneous TID AC KATHY Leonard      levETIRAcetam  750 mg Oral Q12H [address] [place] [address], DO      lidocaine  1 patch Topical Daily PRN Marychuy Johnson MD      melatonin  6 mg Oral HS Serene Troy, DO      metoprolol tartrate  25 mg Oral Q12H [address] [place] [address] Street, DO      midodrine  10 mg Oral Q8H Serene Center, DO      multivitamin-minerals  1 tablet Oral Daily Serene Center, DO      omeprazole (PRILOSEC) suspension 2 mg/mL  20 mg Oral Q12H Serene Center, DO      phenazopyridine  100 mg Oral TID With Tim Anne MD      potassium chloride  40 mEq Oral TID With Meals Shira Hines MD      sertraline  125 mg Oral Daily Jennifer Bateman MD          Today, Patient Was Seen By: Nicolette Koehler MD    ** Please Note: This note has been constructed using a voice recognition system   **

## 2021-03-03 NOTE — TRANSPORTATION MEDICAL NECESSITY
Section I - General Information    Name of Patient: Rico Andujar                 : 1960    Medicare #: [id number]  Transport Date: 21 (PCS is valid for round trips on this date and for all repetitive trips in the 60-day range as noted below )  Origin: [address] [place] [address] Road: [address] [place] [address], [address] [place] [address], [address], [address]  Is the pt's stay covered under Medicare Part A (PPS/DRG)   []     Closest appropriate facility? If no, why is transport to more distant facility required? Yes  If hospice pt, is this transport related to pt's terminal illness? NA       Section II - Medical Necessity Questionnaire  Ambulance transportation is medically necessary only if other means of transport are contraindicated or would be potentially harmful to the patient  To meet this requirement, the patient must either be "bed confined" or suffer from a condition such that transport by means other than ambulance is contraindicated by the patient's condition  The following questions must be answered by the medical professional signing below for this form to be valid:    1)  Describe the MEDICAL CONDITION (physical and/or mental) of this patient AT [address] [place] [address] that requires the patient to be transported in an ambulance and why transport by other means is contraindicated by the patient's condition: Per PT note, patient requires a elo lift for OOB and is dependent to roll in the bed related to hx of stroke 1 year ago and Covid-19 infection + 2021, patient also has a hx of seizures on 2021  Patient is on 3 L nasal cannula and cannot self-administer  Patient is 341 pounds  2) Is the patient "bed confined" as defined below?      No  To be "be confined" the patient must satisfy all three of the following conditions: (1) unable to get up from bed without Assistance; AND (2) unable to ambulate; AND (3) unable to sit in a chair or wheelchair  3) Can this patient safely be transported by car or wheelchair van (i e , seated during transport without a medical attendant or monitoring)? Yes    4) In addition to completing questions 1-3 above, please check any of the following conditions that apply*:   *Note: supporting documentation for any boxes checked must be maintained in the patient's medical records  If hosp-hosp transfer, describe services needed at 2nd facility not available at 1st facility? Medical attendant required   Requires oxygen-unable to self administer  Special handling/isolation/infection control precautions required   Unable to tolerate seated position for time needed to transport   Morbid obesity requires additional personnel/equipment to safely handle patient   Other(specify) Per PT note, patient requires a elo lift for OOB and is dependent to roll in the bed related to hx of stroke and Covid-19 infection + 1/16/2021, patient also has a hx of seizures on 2/1/2021  Section III - Signature of Physician or Healthcare Professional  I certify that the above information is true and correct based on my evaluation of this patient, and represent that the patient requires transport by ambulance and that other forms of transport are contraindicated  I understand that this information will be used by the Centers for Medicare and Medicaid Services (CMS) to support the determination of medical necessity for ambulance services, and I represent that I have personal knowledge of the patient's condition at time of transport  []  If this box is checked, I also certify that the patient is physically or mentally incapable of signing the ambulance service's claim and that the institution with which I am affiliated has furnished care, services, or assistance to the patient  My signature below is made on behalf of the patient pursuant to 42 CFR §424 36(b)(4)   In accordance with 42 CFR §424 37, the specific reason(s) that the patient is physically or mentally incapable of signing the claim form is as follows: NA      Signature of Physician* or Healthcare Professional______________________________________________________________  Signature Date 03/03/21 (For scheduled repetitive transports, this form is not valid for transports performed more than 60 days after this date)    Printed Name & Credentials of Physician or Healthcare Professional (MD, DO, RN, etc ) Thefaribaa Carla  *Form must be signed by patient's attending physician for scheduled, repetitive transports   For non-repetitive, unscheduled ambulance transports, if unable to obtain the signature of the attending physician, any of the following may sign (choose appropriate option below)  [] Physician Assistant []  Clinical Nurse Specialist []  Registered Nurse  []  Nurse Practitioner  [x] Discharge Planner

## 2021-03-03 NOTE — QUICK NOTE
Contacted by nursing via TT of new widespread rash noted on L lower ext > R, groin, abdomen, and upper extremities  I personally went and evaluated the patient, rash possibly folliculitis with irritation secondary to reported scratching by nursing  Discussed with nursing any changes to skin care regimen (ie soaps/ lotions) which they report has remained the same over the past week

## 2021-03-03 NOTE — CASE MANAGEMENT
CM was notified by [facility] that patient is not medically stable for discharge today  CM called [facility] and spoke to Ed to cancel transport as well as sending a note via [employer]  CM also spoke to [facility] in admissions at [address] at [phone] to notify of likely discharge tomorrow instead of today pending medical stability  Bed is available  Facility contacts updated in [address]  Patient [facility] [address] and [facility]  CM called insurance and spoke to [facility] to request S transportation auth for tomorrow  Awaiting callback from [place] [address] on company and time for tomorrow  Callback number is [phone]  CM called spouse Genie Torres (Spouse) [phone] (H) to update  Spouse Kelsi Owen reports that patient has been at [facility] for 2 years; she also states that she and her mother have appointments tomorrow so may not be home to answer the phone tomorrow, however she reports that patient may be transferred as planned and she will wait [facility] once he has arrived

## 2021-03-04 VITALS
HEIGHT: 71 IN | HEART RATE: 80 BPM | DIASTOLIC BLOOD PRESSURE: 64 MMHG | RESPIRATION RATE: 18 BRPM | OXYGEN SATURATION: 97 % | SYSTOLIC BLOOD PRESSURE: 128 MMHG | TEMPERATURE: 97.8 F | WEIGHT: 315 LBS | BODY MASS INDEX: 44.1 KG/M2

## 2021-03-04 LAB
ALBUMIN SERPL BCP-MCNC: 2.8 G/DL (ref 3.5–5)
ALP SERPL-CCNC: 112 U/L (ref 46–116)
ALT SERPL W P-5'-P-CCNC: 21 U/L (ref 12–78)
ANION GAP SERPL CALCULATED.3IONS-SCNC: 11 MMOL/L (ref 4–13)
ANISOCYTOSIS BLD QL SMEAR: PRESENT
AST SERPL W P-5'-P-CCNC: 28 U/L (ref 5–45)
BASOPHILS # BLD MANUAL: 0.07 THOUSAND/UL (ref 0–0.1)
BASOPHILS NFR MAR MANUAL: 1 % (ref 0–1)
BILIRUB DIRECT SERPL-MCNC: 0.09 MG/DL (ref 0–0.2)
BILIRUB SERPL-MCNC: 0.3 MG/DL (ref 0.2–1)
BUN SERPL-MCNC: 23 MG/DL (ref 5–25)
CALCIUM SERPL-MCNC: 9.3 MG/DL (ref 8.3–10.1)
CHLORIDE SERPL-SCNC: 119 MMOL/L (ref 100–108)
CO2 SERPL-SCNC: 20 MMOL/L (ref 21–32)
CREAT SERPL-MCNC: 1.19 MG/DL (ref 0.6–1.3)
EOSINOPHIL # BLD MANUAL: 0.82 THOUSAND/UL (ref 0–0.4)
EOSINOPHIL NFR BLD MANUAL: 11 % (ref 0–6)
ERYTHROCYTE [DISTWIDTH] IN BLOOD BY AUTOMATED COUNT: 26.5 % (ref 11.6–15.1)
ERYTHROCYTE [DISTWIDTH] IN BLOOD BY AUTOMATED COUNT: 26.5 % (ref 11.6–15.1)
GFR SERPL CREATININE-BSD FRML MDRD: 66 ML/MIN/1.73SQ M
GLUCOSE SERPL-MCNC: 87 MG/DL (ref 65–140)
GLUCOSE SERPL-MCNC: 93 MG/DL (ref 65–140)
GLUCOSE SERPL-MCNC: 97 MG/DL (ref 65–140)
HCT VFR BLD AUTO: 39.2 % (ref 36.5–49.3)
HCT VFR BLD AUTO: 39.2 % (ref 36.5–49.3)
HGB BLD-MCNC: 10.8 G/DL (ref 12–17)
HGB BLD-MCNC: 10.8 G/DL (ref 12–17)
LG PLATELETS BLD QL SMEAR: PRESENT
LYMPHOCYTES # BLD AUTO: 0.67 THOUSAND/UL (ref 0.6–4.47)
LYMPHOCYTES # BLD AUTO: 9 % (ref 14–44)
MCH RBC QN AUTO: 24.6 PG (ref 26.8–34.3)
MCH RBC QN AUTO: 24.6 PG (ref 26.8–34.3)
MCHC RBC AUTO-ENTMCNC: 27.6 G/DL (ref 31.4–37.4)
MCHC RBC AUTO-ENTMCNC: 27.6 G/DL (ref 31.4–37.4)
MCV RBC AUTO: 89 FL (ref 82–98)
MCV RBC AUTO: 89 FL (ref 82–98)
MONOCYTES # BLD AUTO: 0.44 THOUSAND/UL (ref 0–1.22)
MONOCYTES NFR BLD: 6 % (ref 4–12)
MYELOCYTES NFR BLD MANUAL: 3 % (ref 0–1)
NEUTROPHILS # BLD MANUAL: 5.04 THOUSAND/UL (ref 1.85–7.62)
NEUTS BAND NFR BLD MANUAL: 1 % (ref 0–8)
NEUTS SEG NFR BLD AUTO: 67 % (ref 43–75)
NRBC BLD AUTO-RTO: 0 /100 WBCS
NRBC BLD AUTO-RTO: 1 /100 WBC (ref 0–2)
OVALOCYTES BLD QL SMEAR: PRESENT
PLATELET # BLD AUTO: 324 THOUSANDS/UL (ref 149–390)
PLATELET # BLD AUTO: 324 THOUSANDS/UL (ref 149–390)
PLATELET BLD QL SMEAR: ADEQUATE
PMV BLD AUTO: 11.3 FL (ref 8.9–12.7)
PMV BLD AUTO: 11.3 FL (ref 8.9–12.7)
POLYCHROMASIA BLD QL SMEAR: PRESENT
POTASSIUM SERPL-SCNC: 3.2 MMOL/L (ref 3.5–5.3)
PROT SERPL-MCNC: 7.1 G/DL (ref 6.4–8.2)
RBC # BLD AUTO: 4.39 MILLION/UL (ref 3.88–5.62)
RBC # BLD AUTO: 4.39 MILLION/UL (ref 3.88–5.62)
SODIUM SERPL-SCNC: 150 MMOL/L (ref 136–145)
TOTAL CELLS COUNTED SPEC: 100
VARIANT LYMPHS # BLD AUTO: 2 %
WBC # BLD AUTO: 7.41 THOUSAND/UL (ref 4.31–10.16)
WBC # BLD AUTO: 7.41 THOUSAND/UL (ref 4.31–10.16)

## 2021-03-04 PROCEDURE — 99252 IP/OBS CONSLTJ NEW/EST SF 35: CPT | Performed by: DERMATOLOGY

## 2021-03-04 PROCEDURE — 80076 HEPATIC FUNCTION PANEL: CPT | Performed by: DERMATOLOGY

## 2021-03-04 PROCEDURE — 85025 COMPLETE CBC W/AUTO DIFF WBC: CPT | Performed by: DERMATOLOGY

## 2021-03-04 PROCEDURE — 82948 REAGENT STRIP/BLOOD GLUCOSE: CPT

## 2021-03-04 PROCEDURE — 99239 HOSP IP/OBS DSCHRG MGMT >30: CPT | Performed by: FAMILY MEDICINE

## 2021-03-04 PROCEDURE — 85007 BL SMEAR W/DIFF WBC COUNT: CPT | Performed by: DERMATOLOGY

## 2021-03-04 PROCEDURE — 85027 COMPLETE CBC AUTOMATED: CPT | Performed by: DERMATOLOGY

## 2021-03-04 PROCEDURE — 94760 N-INVAS EAR/PLS OXIMETRY 1: CPT

## 2021-03-04 PROCEDURE — 80048 BASIC METABOLIC PNL TOTAL CA: CPT | Performed by: FAMILY MEDICINE

## 2021-03-04 PROCEDURE — 85027 COMPLETE CBC AUTOMATED: CPT | Performed by: FAMILY MEDICINE

## 2021-03-04 PROCEDURE — 94003 VENT MGMT INPAT SUBQ DAY: CPT

## 2021-03-04 RX ORDER — POTASSIUM CHLORIDE 20 MEQ/1
40 TABLET, EXTENDED RELEASE ORAL ONCE
Status: COMPLETED | OUTPATIENT
Start: 2021-03-04 | End: 2021-03-04

## 2021-03-04 RX ORDER — MIDODRINE HYDROCHLORIDE 10 MG/1
10 TABLET ORAL EVERY 8 HOURS
Qty: 90 TABLET | Refills: 0
Start: 2021-03-04 | End: 2021-11-09 | Stop reason: HOSPADM

## 2021-03-04 RX ADMIN — PHENAZOPYRIDINE 100 MG: 100 TABLET ORAL at 11:52

## 2021-03-04 RX ADMIN — ASPIRIN 81 MG CHEWABLE TABLET 81 MG: 81 TABLET CHEWABLE at 08:59

## 2021-03-04 RX ADMIN — SERTRALINE 125 MG: 25 TABLET, FILM COATED ORAL at 08:50

## 2021-03-04 RX ADMIN — POTASSIUM CHLORIDE 40 MEQ: 1500 TABLET, EXTENDED RELEASE ORAL at 08:50

## 2021-03-04 RX ADMIN — Medication 20 MG: at 05:29

## 2021-03-04 RX ADMIN — METOPROLOL TARTRATE 25 MG: 25 TABLET, FILM COATED ORAL at 08:50

## 2021-03-04 RX ADMIN — Medication 2000 UNITS: at 08:50

## 2021-03-04 RX ADMIN — APIXABAN 2.5 MG: 2.5 TABLET, FILM COATED ORAL at 08:59

## 2021-03-04 RX ADMIN — MIDODRINE HYDROCHLORIDE 10 MG: 5 TABLET ORAL at 08:51

## 2021-03-04 RX ADMIN — LEVETIRACETAM 750 MG: 500 TABLET, FILM COATED ORAL at 08:50

## 2021-03-04 RX ADMIN — BUMETANIDE 2 MG: 1 TABLET ORAL at 08:59

## 2021-03-04 RX ADMIN — PHENAZOPYRIDINE 100 MG: 100 TABLET ORAL at 08:51

## 2021-03-04 RX ADMIN — POTASSIUM CHLORIDE 40 MEQ: 1500 TABLET, EXTENDED RELEASE ORAL at 11:52

## 2021-03-04 RX ADMIN — MIDODRINE HYDROCHLORIDE 10 MG: 5 TABLET ORAL at 01:00

## 2021-03-04 NOTE — CASE MANAGEMENT
CM received call from [id number] [place] [address] Avenue through [facility] reporting patient's insurance reached out to them to arrange transport for this morning  CM called and spoke to [facility] at [address] to confirm  Kwame reports insurance providing them authorization and they are set to  patient today at noon  CM spoke to [facility] who confirms patient is stable for DC today  CM spoke to [facility] in admissions at [facility] to make her aware of transportation today at noon  Nursing to call report to facility at [phone] ext [phone]  Facility contacts updated  Medical necessity previously completed and placed in chart  CM made patient and bedside RN aware of transport time as well  CM attempted to call wife at number on file, unable to leave message as asked for a pin  Per previous note wife would be away from phone today and is aware patient to DC today to [facility]

## 2021-03-04 NOTE — CONSULTS
Consultation - Dermatology   Hermann Roque 63 y o  male MRN: [id number]  Unit/Bed#: S -01 Encounter: [id number]      Consults      Assessment/Recommendations   Based on a thorough discussion of this condition and the management approach to it (including a comprehensive discussion of the known risks, side effects and potential benefits of treatment), the patient (family) agrees to implement the following specific plan:    Mild eczematous dermatitis, likely exacerbated from heat and laying in bed, some possible intertrigo in left axilla  Less likely drug eruption because limited in area, however would monitor for worsening  - ketoconazole cream 2x/day to axilla  - hydrocortisone 2 5% cream to rash  - cetirizine 10 mg daily  - eosinophils 11%    Patient expressed thoughts of not wanting to live  I conveyed this to nurse and attending Dr Stephanie Castro  Please set up discharge follow up by calling our office: [phone] ([phone])     Thank you for involving me in the care of your patient  Please call with questions, change in clinical status or if tests recommended above are abnormal      Discussed with the primary service  History:     HISTORY OF PRESENT ILLNESS:    Reason for Consult: rash  HPI: Hermann Roque is a 63y o  year old male originally admitted with covid  Dermatology consulted for pruritic rash x 1 day  Put on benadryl  On keppra for few weeks        ROS:  Itchy rash      PAST MEDICAL HISTORY:  Past Medical History:   Diagnosis Date    Allergic rhinitis     last assessed 9/12/12    Finger fracture, right     Closed fx of the middle phalanx of the right 5th finger  last assessed 1/30/14    GI bleed 2/22/2019    Hemorrhoids     last assessed 2/10/14    Hyperlipidemia     Hypertension     Stroke [facility])      Past Surgical History:   Procedure Laterality Date    AORTIC VALVE REPLACEMENT  07/30/2014    AVR with 25mm [facility] Ease bovine pericardial valve    CARDIAC CATHETERIZATION 07/18/2014    SLB left main-normal, circumflex-normal, ramus intermedius-normal, RCA was large and dominant giving rise to the PDA & a large posterolateral branch  No disease  last assessed 8/19/14     COLONOSCOPY N/A 2/25/2019    Procedure: COLONOSCOPY;  Surgeon: Jono Medrano DO;  [facility]; Service: Gastroenterology    ESOPHAGOGASTRODUODENOSCOPY N/A 2/22/2019    Procedure: ESOPHAGOGASTRODUODENOSCOPY (EGD)-roadshow overnight;  Surgeon: Jono Medrano DO;  [facility]; Service: Gastroenterology    ESOPHAGOGASTRODUODENOSCOPY N/A 2/23/2019    Procedure: ESOPHAGOGASTRODUODENOSCOPY (EGD); Surgeon: Sherman Young MD;  [facility]; Service: Gastroenterology    ESOPHAGOGASTRODUODENOSCOPY N/A 2/25/2019    Procedure: ESOPHAGOGASTRODUODENOSCOPY (EGD); Surgeon: Jono Medrano DO;  [facility];   Service: Gastroenterology    IR NON-TUNNELED CENTRAL LINE PLACEMENT  3/1/2019    IR NON-TUNNELED CENTRAL LINE PLACEMENT  2/4/2019    IR TUNNELED DIALYSIS CATHETER CHECK/CHANGE/REPOSITION/ANGIOPLASTY  4/18/2019    IR TUNNELED DIALYSIS CATHETER PLACEMENT  2/4/2019    IR TUNNELED DIALYSIS CATHETER PLACEMENT  2/18/2019    KNEE ARTHROSCOPY      KNEE CARTILAGE SURGERY Left     medial meniscus tear     TESTICLE SURGERY      TONSILLECTOMY AND ADENOIDECTOMY      TOOTH EXTRACTION         FAMILY HISTORY:  Non-contributory    SOCIAL HISTORY:  Social History   /Civil Union  Social History     Substance and Sexual Activity   Alcohol Use Never    Frequency: Never    Comment: ocassion /never drank alcohol per [employer]      Social History     Substance and Sexual Activity   Drug Use No     Social History     Tobacco Use   Smoking Status Never Smoker   Smokeless Tobacco Never Used       ALLERGIES:  Allergies   Allergen Reactions    Amiodarone      Developed Rash    Penicillins Rash     However, has subsequently tolerated Cefazolin and Cefepime       MEDICATIONS:  All current active medications have been reviewed  Physical exam:     Temp:  [97 8 °F (36 6 °C)] 97 8 °F (36 6 °C)  HR:  [] 80  Resp:  [18] 18  BP: (106-128)/(63-64) 128/64  SpO2:  [94 %-97 %] 97 %  Temp (24hrs), Av 8 °F (36 6 °C), Min:97 8 °F (36 6 °C), Max:97 8 °F (36 6 °C)  Current: Temperature: 97 8 °F (36 6 °C)    PSYCH: Normal mood and affect  EYES: Normal conjunctiva  ENT: Normal lips and oral mucosa  CARDIOVASCULAR: No edema  RESPIRATORY: Normal respirations  HEME/LYMPH/IMMUNO:  No regional lymphadenopathy except as noted below in ASSESSMENT AND PLAN BY DIAGNOSIS    Unable to turn patient, examined face, neck, left arm, chest, abdomen, anterior legs  Pink patch in left axilla  Pink, eczematous plaque on left flank  Face spared  Part of back visualized was clear            Labs, Imaging, & Other Studies     Lab Results:  I have personally reviewed pertinent labs       Results from last 7 days   Lab Units 21  0533 21  0550   WBC Thousand/uL 7 41  7 41 7 54   HEMOGLOBIN g/dL 10 8*  10 8* 9 9*   PLATELETS Thousands/uL 324  324 398*     Results from last 7 days   Lab Units 21  0533   POTASSIUM mmol/L 3 2*   CHLORIDE mmol/L 119*   CO2 mmol/L 20*   BUN mg/dL 23   CREATININE mg/dL 1 19   EGFR ml/min/1 73sq m 66   CALCIUM mg/dL 9 3   AST U/L 28   ALT U/L 21   ALK PHOS U/L 112

## 2021-03-05 NOTE — UTILIZATION REVIEW
Notification of Discharge  This is a Notification of Discharge from our facility [address]  Please be advised that this patient has been discharge from our facility  Below you will find the admission and discharge date and time including the patients disposition  PRESENTATION DATE: 1/17/2021  4:16 PM  OBS ADMISSION DATE:   IP ADMISSION DATE: 1/17/21 1616   DISCHARGE DATE: 3/4/2021 12:25 PM  DISPOSITION: Non [facility] Non [facility]   Admission Orders listed below:  Admission Orders (From admission, onward)     Ordered        01/17/21 1649  INPATIENT ADMISSION  Once                   Please contact the UR Department if additional information is required to close this patient's authorization/case  [address] [facility] Utilization Review Department  Main: [phone] x carefully listen to the prompts  All voicemails are confidential   [email or web address]  Send all requests for admission clinical reviews, approved or denied determinations and any other requests to dedicated fax number below belonging to the campus where the patient is receiving treatment   List of dedicated fax numbers:  [address] DENIALS (Administrative/Medical Necessity) [phone]   [address] [place] [address] (Maternity/NICU/Pediatrics) [phone]   [facility] [phone]   Carmen Torres [phone]   Paras Him [phone]   [facility] [address] [place] [address] [phone]   [facility]  [phone]   [address] [facility] [phone]

## 2021-03-08 ENCOUNTER — TELEPHONE (OUTPATIENT)
Dept: NEUROLOGY | Facility: CLINIC | Age: 61
End: 2021-03-08

## 2021-03-08 NOTE — TELEPHONE ENCOUNTER
[address] at [phone] ext [phone] spoke to [facility] who transferred me to [facility] appt for a virtual video visit 4/20/2021 with Jeannine Junior in [address]  Email- [email or web address]    [facility]/Concern for [facility]    NOTE FROM CHART:  Reason for Consult / Principal Problem: concern for seizure  Venusac Teressa will need follow up in in 6 weeks with epilepsy attending or advance practitioner  He will not require outpatient neurological testing

## 2021-03-10 ENCOUNTER — TELEMEDICINE (OUTPATIENT)
Dept: PULMONOLOGY | Facility: CLINIC | Age: 61
End: 2021-03-10
Payer: COMMERCIAL

## 2021-03-10 VITALS
HEART RATE: 76 BPM | RESPIRATION RATE: 16 BRPM | OXYGEN SATURATION: 96 % | TEMPERATURE: 97.8 F | DIASTOLIC BLOOD PRESSURE: 64 MMHG | SYSTOLIC BLOOD PRESSURE: 120 MMHG

## 2021-03-10 DIAGNOSIS — R60.0 BILATERAL LEG EDEMA: Chronic | ICD-10-CM

## 2021-03-10 DIAGNOSIS — E66.01 MORBID OBESITY (HCC): Chronic | ICD-10-CM

## 2021-03-10 DIAGNOSIS — J96.11 CHRONIC HYPOXEMIC RESPIRATORY FAILURE (HCC): ICD-10-CM

## 2021-03-10 DIAGNOSIS — E66.2 OBESITY HYPOVENTILATION SYNDROME (HCC): ICD-10-CM

## 2021-03-10 DIAGNOSIS — I10 ESSENTIAL HYPERTENSION: Primary | Chronic | ICD-10-CM

## 2021-03-10 DIAGNOSIS — U07.1 COVID-19: ICD-10-CM

## 2021-03-10 DIAGNOSIS — I48.19 PERSISTENT ATRIAL FIBRILLATION (HCC): Chronic | ICD-10-CM

## 2021-03-10 PROCEDURE — 99215 OFFICE O/P EST HI 40 MIN: CPT | Performed by: INTERNAL MEDICINE

## 2021-03-10 RX ORDER — ALBUTEROL SULFATE 90 UG/1
2 AEROSOL, METERED RESPIRATORY (INHALATION) EVERY 6 HOURS PRN
Qty: 8.5 G | Refills: 6 | Status: SHIPPED | OUTPATIENT
Start: 2021-03-10

## 2021-03-10 NOTE — ASSESSMENT & PLAN NOTE
·  Multifactorial secondary to recent EJPFI-09 infection, complicated with ESBL secondary bacterial pneumonia on top of possible also  ANGELINA/ohs  ·  2 L nasal cannula the patient saturations in the 96% today  ·  recommended more adherence to BiPAP nocturnally 16/8  ·  out of bed in a chair  ·  albuterol HFA for pulmonary  Toilet p r n  for shortness of breath

## 2021-03-10 NOTE — ASSESSMENT & PLAN NOTE
·  Patient had COVID-19 positive test on 01/16/2021 admitted in  [facility] then transferred to [facility] for further management he was intubated due to acute hypercapnic and hypoxemic respiratory failure he was in shock initially and then was extubated on 01/18/2021  ·  he was treated with the severe COVID treatment pathway taken Actemra on 01/28  ·  convalescent plasma on 01/17  ·  and he took 1 month's course of Decadron  ·  was discharged to [facility] on 2 L nasal cannula  ·  supposed to be wearing BiPAP at night with a setting of 16/8 however the patient has been using it because of tolerance issues, I did change the settings of the BiPAP adding ramp of 30 minutes at 4 cm H2O with smart ramp, counseled the patient extensively and the nurse for the importance of adherence to BiPAP to be referred to prevent further daytime hypoxemia and hypercapnia and to encourage the patient giving him more energy during the day so he participates morning physical therapy

## 2021-03-10 NOTE — PROGRESS NOTES
Virtual Regular Visit      Assessment/Plan:    Problem List Items Addressed This Visit        Respiratory    Chronic hypoxemic respiratory failure (HCC)     ·  Multifactorial secondary to recent ELEHV-84 infection, complicated with ESBL secondary bacterial pneumonia on top of possible also  ANGELINA/ohs  ·  2 L nasal cannula the patient saturations in the 96% today  ·  recommended more adherence to BiPAP nocturnally 16/8  ·  out of bed in a chair  ·  albuterol HFA for pulmonary  Toilet p r n  for shortness of breath           Obesity hypoventilation syndrome (HCC)     ·  In view of morbid obesity with body mass index of 47 1  ·  patient is supposedly wearing BiPAP at night unfortunately has been wearing it for tolerance issues I did add a ramp of 30  Minutes at 4 cm H2O  ·  he is currently having fullface mask  ·  counseled the patient extensively for the importance of wearing BiPAP at night he promised that he were start wearing it            Cardiovascular and Mediastinum    Essential hypertension - Primary (Chronic)     Untreated ANGELINA / ohs is at risk for uncontrolled hypertension         Persistent atrial fibrillation (HCC) (Chronic)      Untreated ANGELINA/ohs is at risk for uncontrolled AFib            Other    Bilateral leg edema (Chronic)      Still with +1 to 2 pitting edema patient is currently taking 2 mg of Bumex daily         Morbid obesity  (Chronic)      With possible underlying ANGELINA / ohs and nocturnal hypoxemia with  alveolar hypoventilation recommend continuing BiPAP therapy         COVID-19     ·  Patient had COVID-19 positive test on 01/16/2021 admitted in  [facility] then transferred to [facility] for further management he was intubated due to acute hypercapnic and hypoxemic respiratory failure he was in shock initially and then was extubated on 01/18/2021  ·  he was treated with the severe COVID treatment pathway taken Actemra on 01/28  ·  convalescent plasma on 01/17  ·  and he took 1 month's course of Decadron  ·  was discharged to [facility] on 2 L nasal cannula  ·  supposed to be wearing BiPAP at night with a setting of 16/8 however the patient has been using it because of tolerance issues, I did change the settings of the BiPAP adding ramp of 30 minutes at 4 cm H2O with smart ramp, counseled the patient extensively and the nurse for the importance of adherence to BiPAP to be referred to prevent further daytime hypoxemia and hypercapnia and to encourage the patient giving him more energy during the day so he participates morning physical therapy                    Reason for visit is   Chief Complaint   Patient presents with    Virtual Regular Visit        Encounter provider Santa Ferreira MD    Provider located at [address] [place] [id number] [phone]      Recent Visits  No visits were found meeting these conditions  Showing recent visits within past 7 days and meeting all other requirements     Today's Visits  Date Type Provider Dept   03/10/21 Telemedicine Elise Dos Santos MD [facility]   Showing today's visits and meeting all other requirements     Future Appointments  No visits were found meeting these conditions  Showing future appointments within next 150 days and meeting all other requirements        The patient was identified by name and date of birth  Kanu Johnson was informed that this is a telemedicine visit and that the visit is being conducted through [employer] and patient was informed that this is a secure, HIPAA-compliant platform  He agrees to proceed     My office door was closed  No one else was in the room  He acknowledged consent and understanding of privacy and security of the video platform  The patient has agreed to participate and understands they can discontinue the visit at any time  Patient is aware this is a billable service       Branden Diaz Karl Ruano is a 63 y o  male  With complicated past medical history as detailed below     unfortunately with recent admission for prolonged 2 months in [facility] secondary to recent COVID-19 infection got complicated with acute hypoxemic hypercapnic respiratory failure for which he was intubated for day for 2 days he was subsequently treated with convalescent plasma and Actemra his course also got complicated the secondary bacterial pneumonia with ESBL and he completed finished antibiotics and he took 30 days of Decadron for his COVID pneumonitis  He was subsequently discharged to [facility] for further management and rehabilitation  the interview would made through video call with the patient and his nurse Rhonda Womack,  He  Offers no new complaints however from the nursing report she still me that he has not been actively participating as much in rehabilitation as he is feeling very tired to be noted the patient is supposedly wearing BiPAP at night 16/8 however unfortunately he has not been wearing since he went over there I spoke with the patient he tells me that the problem is it is too much air on his face and he can sleep with the for which I adjusted his settings adding ramp of 4 cm water to 30 minutes and I counseled him extensively about the importance of wearing BiPAP and him the consequences of untreated and possible underlying OHS including but not limited to cardiovascular and neurological morbidity mortality and readmission to the hospital     He offers no respiratory complaints no significant cough or shortness of breath given the limitation of his activity levels it was hard to assess his respiratory status however he is wearing his oxygen which is set up at 2 L nasal cannula continuously and his saturations in the 96% per the nursing vitals today           Past Medical History:   Diagnosis Date    Allergic rhinitis     last assessed 9/12/12    Finger fracture, right     Closed fx of the middle phalanx of the right 5th finger  last assessed 1/30/14    GI bleed 2/22/2019    Hemorrhoids     last assessed 2/10/14    Hyperlipidemia     Hypertension     Stroke [facility])        Past Surgical History:   Procedure Laterality Date    AORTIC VALVE REPLACEMENT  07/30/2014    AVR with 25mm [facility] Ease bovine pericardial valve    CARDIAC CATHETERIZATION  07/18/2014    SLB left main-normal, circumflex-normal, ramus intermedius-normal, RCA was large and dominant giving rise to the PDA & a large posterolateral branch  No disease  last assessed 8/19/14     COLONOSCOPY N/A 2/25/2019    Procedure: COLONOSCOPY;  Surgeon: New Lepe DO;  Location: [facility]; Service: Gastroenterology    ESOPHAGOGASTRODUODENOSCOPY N/A 2/22/2019    Procedure: ESOPHAGOGASTRODUODENOSCOPY (EGD)-roadshow overnight;  Surgeon: New Lepe DO;  Location: [facility]; Service: Gastroenterology    ESOPHAGOGASTRODUODENOSCOPY N/A 2/23/2019    Procedure: ESOPHAGOGASTRODUODENOSCOPY (EGD); Surgeon: Steff Martinez MD;  [facility]: [facility]; Service: Gastroenterology    ESOPHAGOGASTRODUODENOSCOPY N/A 2/25/2019    Procedure: ESOPHAGOGASTRODUODENOSCOPY (EGD); Surgeon: New Lepe DO;  [facility]: [facility];   Service: Gastroenterology    IR NON-TUNNELED CENTRAL LINE PLACEMENT  3/1/2019    IR NON-TUNNELED CENTRAL LINE PLACEMENT  2/4/2019    IR TUNNELED DIALYSIS CATHETER CHECK/CHANGE/REPOSITION/ANGIOPLASTY  4/18/2019    IR TUNNELED DIALYSIS CATHETER PLACEMENT  2/4/2019    IR TUNNELED DIALYSIS CATHETER PLACEMENT  2/18/2019    KNEE ARTHROSCOPY      KNEE CARTILAGE SURGERY Left     medial meniscus tear     TESTICLE SURGERY      TONSILLECTOMY AND ADENOIDECTOMY      TOOTH EXTRACTION         Current Outpatient Medications   Medication Sig Dispense Refill    acetaminophen (TYLENOL) 325 mg tablet Take 2 tablets (650 mg total) by mouth every 6 (six) hours as needed for mild pain 30 tablet 0    allopurinol (ZYLOPRIM) 100 mg tablet Take 100 mg by mouth daily      aspirin (ECOTRIN LOW STRENGTH) 81 mg EC tablet Take 1 tablet (81 mg total) by mouth daily  0    atorvastatin (LIPITOR) 40 mg tablet Take 1 tablet (40 mg total) by mouth daily with dinner  0    b complex-vitamin C-folic acid (NEPHROCAPS) 1 mg capsule Take 1 capsule by mouth daily with dinner  0    bisacodyl (DULCOLAX) 10 mg suppository Insert 1 suppository (10 mg total) into the rectum daily as needed for constipation (Patient not taking: Reported on 1/17/2021) 12 suppository 0    bumetanide (BUMEX) 1 mg tablet Take 2 mg by mouth daily       diltiazem (CARDIZEM CD) 180 mg 24 hr capsule Take 1 capsule (180 mg total) by mouth daily  0    enoxaparin (LOVENOX) 40 mg/0 4 mL Inject 40 mg under the skin daily      magnesium oxide (MAG-OX) 400 mg Take 1 tablet (400 mg total) by mouth daily  0    metoprolol tartrate (LOPRESSOR) 50 mg tablet Take 1 tablet (50 mg total) by mouth 3 (three) times a day  0    midodrine (PROAMATINE) 10 MG tablet Take 1 tablet (10 mg total) by mouth every 8 (eight) hours 90 tablet 0    pantoprazole (PROTONIX) 40 mg tablet Take 1 tablet (40 mg total) by mouth 2 (two) times a day before meals (Patient taking differently: Take 40 mg by mouth daily )  0    POTASSIUM CHLORIDE PO Take 40 mEq by mouth 2 (two) times a day      Probiotic Product (BACID PO) Take by mouth      sertraline (ZOLOFT) 100 mg tablet Take 100 mg by mouth daily      sertraline (ZOLOFT) 25 mg tablet Take 1 tablet (25 mg total) by mouth daily after dinner (Patient taking differently: Take 50 mg by mouth daily after dinner 75mg daily)  0    traMADol (ULTRAM) 50 mg tablet Take 50 mg by mouth every 6 (six) hours as needed for moderate pain       No current facility-administered medications for this visit           Allergies   Allergen Reactions    Amiodarone      Developed Rash    Penicillins Rash     However, has subsequently tolerated Cefazolin and Cefepime       Review of Systems   All other systems reviewed and are negative  Video Exam    Vitals:    03/10/21 0911   BP: 120/64   Pulse: 76   Resp: 16   Temp: 97 8 °F (36 6 °C)   SpO2: 96%       Physical Exam  Constitutional:       General: He is not in acute distress  Appearance: He is obese  He is ill-appearing  He is not toxic-appearing or diaphoretic  HENT:      Head: Normocephalic and atraumatic  Nose: No rhinorrhea  Mouth/Throat:      Mouth: Mucous membranes are moist       Pharynx: Oropharynx is clear  No oropharyngeal exudate or posterior oropharyngeal erythema  Eyes:      General: No scleral icterus  Right eye: No discharge  Left eye: No discharge  Conjunctiva/sclera: Conjunctivae normal    Neck:      Musculoskeletal: Normal range of motion  Pulmonary:      Effort: Pulmonary effort is normal  No respiratory distress  Comments: Decreased breath sound bilaterally limited air entry  Musculoskeletal:      Right lower leg: Edema present  Left lower leg: Edema present  Skin:     General: Skin is warm  Coloration: Skin is not jaundiced or pale  Findings: Rash present  No bruising or erythema  Comments: Lower extremity scratch marks   Neurological:      General: No focal deficit present  Mental Status: He is alert and oriented to person, place, and time  Mental status is at baseline  Psychiatric:         Mood and Affect: Mood normal          Behavior: Behavior normal       Comments: Flat affect          I spent 45 minutes with patient today in which greater than 50% of the time was spent in counseling/coordination of care regarding Chronic hypoxemic respiratory failure, shortness of breath, BiPAP adherence, ANGELINA/ohs      VIRTUAL VISIT Emily acknowledges that he has consented to an online visit or consultation   He understands that the online visit is based solely on information provided by him, and that, in the absence of a face-to-face physical evaluation by the physician, the diagnosis he receives is both limited and provisional in terms of accuracy and completeness  This is not intended to replace a full medical face-to-face evaluation by the physician  oTbias Gottlieb understands and accepts these terms

## 2021-03-10 NOTE — ASSESSMENT & PLAN NOTE
With possible underlying ANGELINA / ohs and nocturnal hypoxemia with  alveolar hypoventilation recommend continuing BiPAP therapy

## 2021-03-10 NOTE — ASSESSMENT & PLAN NOTE
·  In view of morbid obesity with body mass index of 47 1  ·  patient is supposedly wearing BiPAP at night unfortunately has been wearing it for tolerance issues I did add a ramp of 30  Minutes at 4 cm H2O  ·  he is currently having fullface mask  ·  counseled the patient extensively for the importance of wearing BiPAP at night he promised that he were start wearing it

## 2021-03-10 NOTE — PATIENT INSTRUCTIONS
BiPAP   AMBULATORY CARE:   Bilevel positive airway pressure (BiPAP)  is a treatment that uses mild air pressure to keep your airways open while you sleep  BiPAP is used to treat obstructive sleep apnea (ANGELINA) in people who cannot tolerate CPAP treatment  It is also used in people with obesity hypoventilation syndrome, central apnea, or restrictive or obstructive lung problems  Difference between BiPAP and CPAP:  The BiPAP machine delivers a higher amount of air pressure when you breathe in than when you breathe out  CPAP delivers a constant level of air pressure during treatment  In both, the mask connects to the machine with a hose  Air pressure is delivered to the mask through the hose  Benefits of BiPAP:   · Improves quality of sleep     · Relieves daytime sleepiness     · Improves memory    · Reduces the risk of heart disease    · Improves your mood and quality of life    Make BiPAP easier to use:   · At first, try to use your BiPAP for a few hours every night  Then slowly increase the length of time you use your machine  It takes time to adjust to BiPAP treatment  · You may need a mask that is a different size, shape, or material   Talk to your healthcare provider if your mask feels uncomfortable or irritates your skin  You may need to use a special moisturizer made for users  · Use a saline nasal spray at bedtime to help relieve nasal irritation  A chin strap to help keep your mouth closed or a different type of mask can help dry mouth  Some machines come with a heated humidifier to help relieve these symptoms  · Talk to your healthcare provider if you are having problems adjusting to the air pressure  He or she can tell you how to adjust the air pressure on your BiPAP  You may need to start at a lower pressure and slowly increase it over time      Call your healthcare provider for any of the following:   · Continued sleepiness during the day, even after wearing your BiPAP device as directed    · Continued problems caused by BiPAP that do not improve    · Questions or concerns about your condition, care, or equipment  Follow up with your healthcare provider as directed:  Tell your healthcare provider if your mask no longer fits properly  Write down your questions so you remember to ask them during your visits  © Copyright [address] Information is for End User's use only and may not be sold, redistributed or otherwise used for commercial purposes  All illustrations and images included in CareNotes® are the copyrighted property of A D A M [employer] Inc  or [facility] Olivia Salamanca   The above information is an  only  It is not intended as medical advice for individual conditions or treatments  Talk to your doctor, nurse or pharmacist before following any medical regimen to see if it is safe and effective for you

## 2021-03-10 NOTE — PLAN OF CARE
Problem: Prexisting or High Potential for Compromised Skin Integrity  Goal: Skin integrity is maintained or improved  Description: INTERVENTIONS:  - Identify patients at risk for skin breakdown  - Assess and monitor skin integrity  - Assess and monitor nutrition and hydration status  - Monitor labs   - Assess for incontinence   - Turn and reposition patient  - Assist with mobility/ambulation  - Relieve pressure over bony prominences  - Avoid friction and shearing  - Provide appropriate hygiene as needed including keeping skin clean and dry  - Evaluate need for skin moisturizer/barrier cream  - Collaborate with interdisciplinary team   - Patient/family teaching  - Consider wound care consult   Outcome: Not Progressing     Problem: Potential for Falls  Goal: Patient will remain free of falls  Description: INTERVENTIONS:  - Assess patient frequently for physical needs  -  Identify cognitive and physical deficits and behaviors that affect risk of falls  -  [place] fall precautions as indicated by assessment   - Educate patient/family on patient safety including physical limitations  - Instruct patient to call for assistance with activity based on assessment  - Modify environment to reduce risk of injury  - Consider OT/PT consult to assist with strengthening/mobility  Outcome: Not Progressing     Problem: Nutrition/Hydration-ADULT  Goal: Nutrient/Hydration intake appropriate for improving, restoring or maintaining nutritional needs  Description: Monitor and assess patient's nutrition/hydration status for malnutrition  Collaborate with interdisciplinary team and initiate plan and interventions as ordered  Monitor patient's weight and dietary intake as ordered or per policy  Utilize nutrition screening tool and intervene as necessary  Determine patient's food preferences and provide high-protein, high-caloric foods as appropriate       INTERVENTIONS:  - Monitor oral intake, urinary output, labs, and treatment plans  - Assess nutrition and hydration status and recommend course of action  - Evaluate amount of meals eaten  - Assist patient with eating if necessary   - Allow adequate time for meals  - Recommend/ encourage appropriate diets, oral nutritional supplements, and vitamin/mineral supplements  - Order, calculate, and assess calorie counts as needed  - Recommend, monitor, and adjust tube feedings and TPN/PPN based on assessed needs  - Assess need for intravenous fluids  - Provide specific nutrition/hydration education as appropriate  - Include patient/family/caregiver in decisions related to nutrition  Outcome: Not Progressing     Problem: NEUROSENSORY - ADULT  Goal: Achieves stable or improved neurological status  Description: INTERVENTIONS  - Monitor and report changes in neurological status  - Monitor vital signs such as temperature, blood pressure, glucose, and any other labs ordered   - Initiate measures to prevent increased intracranial pressure  - Monitor for seizure activity and implement precautions if appropriate      Outcome: Not Progressing  Goal: Achieves maximal functionality and self care  Description: INTERVENTIONS  - Monitor swallowing and airway patency with patient fatigue and changes in neurological status  - Encourage and assist patient to increase activity and self care     - Encourage visually impaired, hearing impaired and aphasic patients to use assistive/communication devices  Outcome: Not Progressing     Problem: CARDIOVASCULAR - ADULT  Goal: Maintains optimal cardiac output and hemodynamic stability  Description: INTERVENTIONS:  - Monitor I/O, vital signs and rhythm  - Monitor for S/S and trends of decreased cardiac output  - Administer and titrate ordered vasoactive medications to optimize hemodynamic stability  - Assess quality of pulses, skin color and temperature  - Assess for signs of decreased coronary artery perfusion  - Instruct patient to report change in severity of symptoms  Outcome: Not Progressing  Goal: Absence of cardiac dysrhythmias or at baseline rhythm  Description: INTERVENTIONS:  - Continuous cardiac monitoring, vital signs, obtain 12 lead EKG if ordered  - Administer antiarrhythmic and heart rate control medications as ordered  - Monitor electrolytes and administer replacement therapy as ordered  Outcome: Not Progressing     Problem: RESPIRATORY - ADULT  Goal: Achieves optimal ventilation and oxygenation  Description: INTERVENTIONS:  - Assess for changes in respiratory status  - Assess for changes in mentation and behavior  - Position to facilitate oxygenation and minimize respiratory effort  - Oxygen administered by appropriate delivery if ordered  - Initiate smoking cessation education as indicated  - Encourage broncho-pulmonary hygiene including cough, deep breathe, Incentive Spirometry  - Assess the need for suctioning and aspirate as needed  - Assess and instruct to report SOB or any respiratory difficulty  - Respiratory Therapy support as indicated  Outcome: Not Progressing     Problem: GENITOURINARY - ADULT  Goal: Maintains or returns to baseline urinary function  Description: INTERVENTIONS:  - Assess urinary function  - Encourage oral fluids to ensure adequate hydration if ordered  - Administer IV fluids as ordered to ensure adequate hydration  - Administer ordered medications as needed  - Offer frequent toileting  - Follow urinary retention protocol if ordered  Outcome: Not Progressing  Goal: Absence of urinary retention  Description: INTERVENTIONS:  - Assess patients ability to void and empty bladder  - Monitor I/O  - Bladder scan as needed  - Discuss with physician/AP medications to alleviate retention as needed  - Discuss catheterization for long term situations as appropriate  Outcome: Not Progressing  Goal: Urinary catheter remains patent  Description: INTERVENTIONS:  - Assess patency of urinary catheter  - If patient has a chronic bo, consider changing catheter if non-functioning  - Follow guidelines for intermittent irrigation of non-functioning urinary catheter  Outcome: Not Progressing     Problem: METABOLIC, FLUID AND ELECTROLYTES - ADULT  Goal: Electrolytes maintained within normal limits  Description: INTERVENTIONS:  - Monitor labs and assess patient for signs and symptoms of electrolyte imbalances  - Administer electrolyte replacement as ordered  - Monitor response to electrolyte replacements, including repeat lab results as appropriate  - Instruct patient on fluid and nutrition as appropriate  Outcome: Not Progressing  Goal: Fluid balance maintained  Description: INTERVENTIONS:  - Monitor labs   - Monitor I/O and WT  - Instruct patient on fluid and nutrition as appropriate  - Assess for signs & symptoms of volume excess or deficit  Outcome: Not Progressing  Goal: Glucose maintained within target range  Description: INTERVENTIONS:  - Monitor Blood Glucose as ordered  - Assess for signs and symptoms of hyperglycemia and hypoglycemia  - Administer ordered medications to maintain glucose within target range  - Assess nutritional intake and initiate nutrition service referral as needed  Outcome: Not Progressing     Problem: MUSCULOSKELETAL - ADULT  Goal: Maintain or return mobility to safest level of function  Description: INTERVENTIONS:  - Assess patient's ability to carry out ADLs; assess patient's baseline for ADL function and identify physical deficits which impact ability to perform ADLs (bathing, care of mouth/teeth, toileting, grooming, dressing, etc )  - Assess/evaluate cause of self-care deficits   - Assess range of motion  - Assess patient's mobility  - Assess patient's need for assistive devices and provide as appropriate  - Encourage maximum independence but intervene and supervise when necessary  - Involve family in performance of ADLs  - Assess for home care needs following discharge   - Consider OT consult to assist with ADL evaluation and planning for discharge  - Provide patient education as appropriate  Outcome: Not Progressing  Goal: Maintain proper alignment of affected body part  Description: INTERVENTIONS:  - Support, maintain and protect limb and body alignment  - Provide patient/ family with appropriate education  Outcome: Not Progressing     Problem: INFECTION - ADULT  Goal: Absence or prevention of progression during hospitalization  Description: INTERVENTIONS:  - Assess and monitor for signs and symptoms of infection  - Monitor lab/diagnostic results  - Monitor all insertion sites, i e  indwelling lines, tubes, and drains  - Monitor endotracheal if appropriate and nasal secretions for changes in amount and color  - [place] appropriate cooling/warming therapies per order  - Administer medications as ordered  - Instruct and encourage patient and family to use good hand hygiene technique  - Identify and instruct in appropriate isolation precautions for identified infection/condition  Outcome: Not Progressing     Problem: DISCHARGE PLANNING  Goal: Discharge to home or other facility with appropriate resources  Description: INTERVENTIONS:  - Identify barriers to discharge w/patient and caregiver  - Arrange for needed discharge resources and transportation as appropriate  - Identify discharge learning needs (meds, wound care, etc )  - Arrange for interpretive services to assist at discharge as needed  - Refer to Case Management Department for coordinating discharge planning if the patient needs post-hospital services based on physician/advanced practitioner order or complex needs related to functional status, cognitive ability, or social support system  Outcome: Not Progressing     Problem: Knowledge Deficit  Goal: Patient/family/caregiver demonstrates understanding of disease process, treatment plan, medications, and discharge instructions  Description: Complete learning assessment and assess knowledge base    Interventions:  - Provide teaching at level of understanding  - Provide teaching via preferred learning methods  Outcome: Not Progressing     Problem: SAFETY,RESTRAINT: NV/NON-SELF DESTRUCTIVE BEHAVIOR  Goal: Remains free of harm/injury (restraint for non violent/non self-detsructive behavior)  Description: INTERVENTIONS:  - Instruct patient/family regarding restraint use   - Assess and monitor physiologic and psychological status   - Provide interventions and comfort measures to meet assessed patient needs   - Identify and implement measures to help patient regain control  - Assess readiness for release of restraint   Outcome: Not Progressing  Goal: Returns to optimal restraint-free functioning  Description: INTERVENTIONS:  - Assess the patient's behavior and symptoms that indicate continued need for restraint  - Identify and implement measures to help patient regain control  - Assess readiness for release of restraint   Outcome: Not Progressing English

## 2021-04-07 ENCOUNTER — OFFICE VISIT (OUTPATIENT)
Dept: PULMONOLOGY | Facility: CLINIC | Age: 61
End: 2021-04-07
Payer: COMMERCIAL

## 2021-04-07 ENCOUNTER — TELEPHONE (OUTPATIENT)
Dept: GASTROENTEROLOGY | Facility: CLINIC | Age: 61
End: 2021-04-07

## 2021-04-07 VITALS — SYSTOLIC BLOOD PRESSURE: 94 MMHG | DIASTOLIC BLOOD PRESSURE: 59 MMHG | OXYGEN SATURATION: 96 % | HEART RATE: 88 BPM

## 2021-04-07 DIAGNOSIS — E66.01 MORBID OBESITY (HCC): Chronic | ICD-10-CM

## 2021-04-07 DIAGNOSIS — I48.0 PAROXYSMAL ATRIAL FIBRILLATION (HCC): Chronic | ICD-10-CM

## 2021-04-07 DIAGNOSIS — J96.11 CHRONIC HYPOXEMIC RESPIRATORY FAILURE (HCC): ICD-10-CM

## 2021-04-07 DIAGNOSIS — R60.0 LOWER EXTREMITY EDEMA: ICD-10-CM

## 2021-04-07 DIAGNOSIS — E66.2 OBESITY HYPOVENTILATION SYNDROME (HCC): ICD-10-CM

## 2021-04-07 DIAGNOSIS — U07.1 COVID-19: Primary | ICD-10-CM

## 2021-04-07 PROCEDURE — 99214 OFFICE O/P EST MOD 30 MIN: CPT | Performed by: PHYSICIAN ASSISTANT

## 2021-04-07 NOTE — ASSESSMENT & PLAN NOTE
Suspected obesity hypoventilation syndrome in the setting of morbid obesity with BMI 47 1  I counseled him extensively on the importance of wearing BiPAP during all sleeping hours  Reviewed risks of untreated ANGELINA / ohs  He told me he will try to tolerated more  Recommend continued BiPAP 16/8  Check compliance report at next follow-up

## 2021-04-07 NOTE — ASSESSMENT & PLAN NOTE
Contributing to patient's multiple comorbidities    Patient will need massive weight loss for any chance of meaningful survival

## 2021-04-07 NOTE — ASSESSMENT & PLAN NOTE
Multifactorial secondary to recent HGOUE-15 infection complicated by ESBL bacterial pneumonia, ANGELINA/ ohs severe deconditioning, morbid obesity  Continue supplemental oxygen at 2 liters/minute during waking hours  Keep oxygen saturations above 88%  Unable to performed desaturation study in the office today as patient immobile  Recommend continued PT/OT and OOB as tolerated at nursing facility

## 2021-04-07 NOTE — ASSESSMENT & PLAN NOTE
Reviewed prolonged hospital course with patient today in the office  No need for COVID directed therapies at this time  Recommend rechecking chest x-ray has melasma we have to review was from February  If abnormalities persist would proceed with pulmonary function tests  Continue albuterol HFA for pulmonary toileting and shortness of breath  Continue supplemental oxygen during waking hours and BiPAP at night

## 2021-04-07 NOTE — ASSESSMENT & PLAN NOTE
Rate controlled on exam today  Continue medical management per Cardiology  Continue anticoagulation with Eliquis 2 5 mg b i d

## 2021-04-07 NOTE — PROGRESS NOTES
Pulmonary Follow Up Note   Tresa Mcmullen 63 y o  male MRN: [id number]  4/7/2021      Assessment:    Chronic hypoxemic respiratory failure ([address] )  Multifactorial secondary to recent IVIAX-77 infection complicated by ESBL bacterial pneumonia, ANGELINA/ ohs severe deconditioning, morbid obesity  Continue supplemental oxygen at 2 liters/minute during waking hours  Keep oxygen saturations above 88%  Unable to performed desaturation study in the office today as patient immobile  Recommend continued PT/OT and OOB as tolerated at nursing facility  Obesity hypoventilation syndrome (HCC)   Suspected obesity hypoventilation syndrome in the setting of morbid obesity with BMI 47 1  I counseled him extensively on the importance of wearing BiPAP during all sleeping hours  Reviewed risks of untreated ANGELINA / ohs  He told me he will try to tolerated more  Recommend continued BiPAP 16/8  Check compliance report at next follow-up  Atrial fibrillation ([address] )  Rate controlled on exam today  Continue medical management per Cardiology  Continue anticoagulation with Eliquis 2 5 mg b i d  COVID-19    Reviewed prolonged hospital course with patient today in the office  No need for COVID directed therapies at this time  Recommend rechecking chest x-ray has melasma we have to review was from February  If abnormalities persist would proceed with pulmonary function tests  Continue albuterol HFA for pulmonary toileting and shortness of breath  Continue supplemental oxygen during waking hours and BiPAP at night  Morbid obesity   Contributing to patient's multiple comorbidities  Patient will need massive weight loss for any chance of meaningful survival      Lower extremity edema  Secondary to CHF  Continue bumex 2 mg po daily per PCP         Plan:    Diagnoses and all orders for this visit:    COVID-19  -     XR chest pa & lateral; Future    Chronic hypoxemic respiratory failure (HCC)    Obesity hypoventilation syndrome ([facility] )    Paroxysmal atrial fibrillation ([facility] )    Morbid obesity     Lower extremity edema        No follow-ups on file  History of Present Illness   HPI:  Soni Hernandez is a 63 y o  male who presents to the office today for COVID 19 follow up  Past medical history positive for suspected ohs/ ANGELINA, a fib, hypertension, hyperlipidemia, morbid obesity with complicated prolonged hospitalization at [address] 1/17/21-3/4/21 for JUBCL-42 infection complicated by acute hypoxic and hypercapnic respiratory failure requiring mechanical ventilation, secondary bacterial pneumonia with ESBL  Inpatient, he was treated with convalescent plasma, Actemra, 10 days of Ertapenem, and >30 days of decadron  He was discharged to [address] on 2[address] NC and BiPAP 16/8  Since discharge, he had follow up with Dr Shannan Morrissey last month who changed his BiPAP setting after patient reported difficulty tolerated the machine  Unfortunately, not much has changed  He is supposed to wear it during all hours of sleep but admits to only wearing it off and on  All he says about his BiPAP is that it's annoying and he doesn't like it  He reports some cough that is nonproductive and SOB after cough but for the most part no other respiratory complaints  No wheezing  No fevers, chills, dizziness, headaches, chest pain, palpitations, GI symptoms, urinary sx  He has leg swelling  Patient wants to go home from nursing home but at the same time tells me that he has given up trying to work with PT/OT and can no longer walk  He admits that he wouldn't be able to take care of himself at home right now  Review of Systems   All other systems reviewed and are negative        Historical Information   Past Medical History:   Diagnosis Date    Allergic rhinitis     last assessed 9/12/12    Finger fracture, right     Closed fx of the middle phalanx of the right 5th finger  last assessed 1/30/14    GI bleed 2/22/2019    Hemorrhoids last assessed 2/10/14    Hyperlipidemia     Hypertension     Stroke [facility])      Past Surgical History:   Procedure Laterality Date    AORTIC VALVE REPLACEMENT  07/30/2014    AVR with 25mm [facility] Ease bovine pericardial valve    CARDIAC CATHETERIZATION  07/18/2014    SLB left main-normal, circumflex-normal, ramus intermedius-normal, RCA was large and dominant giving rise to the PDA & a large posterolateral branch  No disease  last assessed 8/19/14     COLONOSCOPY N/A 2/25/2019    Procedure: COLONOSCOPY;  Surgeon: Niko Bazan DO;  Location: [facility]; Service: Gastroenterology    ESOPHAGOGASTRODUODENOSCOPY N/A 2/22/2019    Procedure: ESOPHAGOGASTRODUODENOSCOPY (EGD)-roadshow overnight;  Surgeon: Niko Bazan DO;  [facility]; Service: Gastroenterology    ESOPHAGOGASTRODUODENOSCOPY N/A 2/23/2019    Procedure: ESOPHAGOGASTRODUODENOSCOPY (EGD); Surgeon: Aly Mae MD;  [facility]: [facility]; Service: Gastroenterology    ESOPHAGOGASTRODUODENOSCOPY N/A 2/25/2019    Procedure: ESOPHAGOGASTRODUODENOSCOPY (EGD); Surgeon: Niko Bazan DO;  [facility];   Service: Gastroenterology    IR NON-TUNNELED CENTRAL LINE PLACEMENT  3/1/2019    IR NON-TUNNELED CENTRAL LINE PLACEMENT  2/4/2019    IR TUNNELED DIALYSIS CATHETER CHECK/CHANGE/REPOSITION/ANGIOPLASTY  4/18/2019    IR TUNNELED DIALYSIS CATHETER PLACEMENT  2/4/2019    IR TUNNELED DIALYSIS CATHETER PLACEMENT  2/18/2019    KNEE ARTHROSCOPY      KNEE CARTILAGE SURGERY Left     medial meniscus tear     TESTICLE SURGERY      TONSILLECTOMY AND ADENOIDECTOMY      TOOTH EXTRACTION       Family History   Problem Relation Age of Onset    Lung cancer Mother     Heart disease Mother         pacemaker placement     Coronary artery disease Father     Hypertension Father     Heart attack Father     Cancer Family         bladder        Social History     Tobacco Use   Smoking Status Never Smoker   Smokeless Tobacco Never Used         Meds/Allergies     Current Outpatient Medications:     acetaminophen (TYLENOL) 325 mg tablet, Take 2 tablets (650 mg total) by mouth every 6 (six) hours as needed for mild pain, Disp: 30 tablet, Rfl: 0    albuterol (ProAir HFA) 90 mcg/act inhaler, Inhale 2 puffs every 6 (six) hours as needed for wheezing or shortness of breath, Disp: 8 5 g, Rfl: 6    allopurinol (ZYLOPRIM) 100 mg tablet, Take 100 mg by mouth daily, Disp: , Rfl:     aspirin (ECOTRIN LOW STRENGTH) 81 mg EC tablet, Take 1 tablet (81 mg total) by mouth daily, Disp: , Rfl: 0    atorvastatin (LIPITOR) 40 mg tablet, Take 1 tablet (40 mg total) by mouth daily with dinner, Disp: , Rfl: 0    b complex-vitamin C-folic acid (NEPHROCAPS) 1 mg capsule, Take 1 capsule by mouth daily with dinner, Disp: , Rfl: 0    bisacodyl (DULCOLAX) 10 mg suppository, Insert 1 suppository (10 mg total) into the rectum daily as needed for constipation (Patient not taking: Reported on 1/17/2021), Disp: 12 suppository, Rfl: 0    bumetanide (BUMEX) 1 mg tablet, Take 2 mg by mouth daily , Disp: , Rfl:     diltiazem (CARDIZEM CD) 180 mg 24 hr capsule, Take 1 capsule (180 mg total) by mouth daily, Disp: , Rfl: 0    enoxaparin (LOVENOX) 40 mg/0 4 mL, Inject 40 mg under the skin daily, Disp: , Rfl:     magnesium oxide (MAG-OX) 400 mg, Take 1 tablet (400 mg total) by mouth daily, Disp: , Rfl: 0    metoprolol tartrate (LOPRESSOR) 50 mg tablet, Take 1 tablet (50 mg total) by mouth 3 (three) times a day, Disp: , Rfl: 0    midodrine (PROAMATINE) 10 MG tablet, Take 1 tablet (10 mg total) by mouth every 8 (eight) hours, Disp: 90 tablet, Rfl: 0    pantoprazole (PROTONIX) 40 mg tablet, Take 1 tablet (40 mg total) by mouth 2 (two) times a day before meals (Patient taking differently: Take 40 mg by mouth daily ), Disp: , Rfl: 0    POTASSIUM CHLORIDE PO, Take 40 mEq by mouth 2 (two) times a day, Disp: , Rfl:     Probiotic Product (BACID PO), Take by mouth, Disp: , Rfl:   sertraline (ZOLOFT) 100 mg tablet, Take 100 mg by mouth daily, Disp: , Rfl:     sertraline (ZOLOFT) 25 mg tablet, Take 1 tablet (25 mg total) by mouth daily after dinner (Patient taking differently: Take 50 mg by mouth daily after dinner 75mg daily), Disp: , Rfl: 0    traMADol (ULTRAM) 50 mg tablet, Take 50 mg by mouth every 6 (six) hours as needed for moderate pain, Disp: , Rfl:   Allergies   Allergen Reactions    Amiodarone      Developed Rash    Penicillins Rash     However, has subsequently tolerated Cefazolin and Cefepime       Vitals: Blood pressure 94/59, pulse 88, SpO2 96 %  There is no height or weight on file to calculate BMI  Oxygen Therapy  SpO2: 96 %    Physical Exam  Physical Exam  Vitals signs reviewed  Constitutional:       Appearance: Normal appearance  He is well-developed  He is obese  Comments: Strapped into stretcher   HENT:      Head: Normocephalic and atraumatic  Right Ear: External ear normal       Left Ear: External ear normal       Nose: Nose normal       Mouth/Throat:      Mouth: Mucous membranes are moist       Pharynx: Oropharynx is clear  Eyes:      Extraocular Movements: Extraocular movements intact  Pupils: Pupils are equal, round, and reactive to light  Neck:      Musculoskeletal: Normal range of motion and neck supple  Cardiovascular:      Rate and Rhythm: Normal rate and regular rhythm  Pulses: Normal pulses  Heart sounds: Normal heart sounds  No murmur  Pulmonary:      Effort: Pulmonary effort is normal  No respiratory distress  Breath sounds: Normal breath sounds  No stridor  No wheezing, rhonchi or rales  Comments: Limited exam due to body habitus, diminished but otherwise CTA bilaterally  Abdominal:      Palpations: Abdomen is soft  Tenderness: There is no abdominal tenderness  Hernia: No hernia is present  Musculoskeletal: Normal range of motion  General: No swelling, tenderness or deformity        Right lower leg: Edema (+1 pitting edema) present  Left lower leg: Edema (+1 pitting edema) present  Skin:     General: Skin is warm and dry  Capillary Refill: Capillary refill takes less than 2 seconds  Neurological:      General: No focal deficit present  Mental Status: He is alert and oriented to person, place, and time  Mental status is at baseline  Psychiatric:         Behavior: Behavior normal          Thought Content: Thought content normal          Judgment: Judgment normal          Labs: I have personally reviewed pertinent lab results  , ABG: No results found for: PHART, [id number], PO2ART, EGI7UIG, Q9ITSNXQ, BEART, SOURCE, BNP: No results found for: BNP, CBC: No results found for: WBC, HGB, HCT, MCV, PLT, ADJUSTEDWBC, MCH, MCHC, RDW, MPV, NRBC, CMP: No results found for: SODIUM, K, CL, CO2, ANIONGAP, BUN, CREATININE, GLUCOSE, CALCIUM, AST, ALT, ALKPHOS, PROT, BILITOT, EGFR, PT/INR: No results found for: PT, INR, Troponin: No results found for: TROPONINI  Lab Results   Component Value Date    WBC 7 41 03/04/2021    WBC 7 41 03/04/2021    HGB 10 8 (L) 03/04/2021    HGB 10 8 (L) 03/04/2021    HCT 39 2 03/04/2021    HCT 39 2 03/04/2021    MCV 89 03/04/2021    MCV 89 03/04/2021     03/04/2021     03/04/2021     Lab Results   Component Value Date    GLUCOSE 105 02/02/2021    CALCIUM 9 3 03/04/2021     04/09/2015    K 3 2 (L) 03/04/2021    CO2 20 (L) 03/04/2021     (H) 03/04/2021    BUN 23 03/04/2021    CREATININE 1 19 03/04/2021     No results found for: IGE  Lab Results   Component Value Date    ALT 21 03/04/2021    AST 28 03/04/2021    ALKPHOS 112 03/04/2021    BILITOT 0 4 10/30/2014       Imaging and other studies: I have personally reviewed pertinent reports  and I have personally reviewed pertinent films in PACS     Chest x-ray 02/14/2021   Extensive bilateral reticulonodular and ground glass opacities  Intact median sternotomy wires present  ETT in place   Enteric tube appropriately placed  Low lung volumes  Pulmonary function testing:  None to review     Other Studies: I have personally reviewed pertinent reports  Echocardiogram 01/20/2021   EF 55%    Right ventricular size and systolic function normal

## 2021-04-08 DIAGNOSIS — R56.9 SEIZURE-LIKE ACTIVITY (HCC): Primary | ICD-10-CM

## 2021-04-20 ENCOUNTER — TELEMEDICINE (OUTPATIENT)
Dept: NEUROLOGY | Facility: CLINIC | Age: 61
End: 2021-04-20
Payer: COMMERCIAL

## 2021-04-20 VITALS — DIASTOLIC BLOOD PRESSURE: 72 MMHG | SYSTOLIC BLOOD PRESSURE: 130 MMHG

## 2021-04-20 DIAGNOSIS — R56.9 SEIZURE-LIKE ACTIVITY (HCC): ICD-10-CM

## 2021-04-20 DIAGNOSIS — Z86.73 HISTORY OF STROKE: Primary | Chronic | ICD-10-CM

## 2021-04-20 DIAGNOSIS — R56.9 SEIZURE (HCC): ICD-10-CM

## 2021-04-20 DIAGNOSIS — I48.0 PAROXYSMAL ATRIAL FIBRILLATION (HCC): Chronic | ICD-10-CM

## 2021-04-20 PROCEDURE — 99214 OFFICE O/P EST MOD 30 MIN: CPT | Performed by: PHYSICIAN ASSISTANT

## 2021-04-20 RX ORDER — HYDROXYZINE HYDROCHLORIDE 25 MG/1
12.5 TABLET, FILM COATED ORAL EVERY 8 HOURS PRN
COMMUNITY
End: 2021-08-19 | Stop reason: HOSPADM

## 2021-04-20 NOTE — PROGRESS NOTES
Virtual Regular Visit      Assessment:  57year old male presents for a hospital follow up  He has history of prior multifocal embolic strokes in 5365, felt to be caused by atrial fibrillation  He has not been on anticoagulation due to GIB and has been maintained on ASA  According to cardiology notes he refused Watchman  More recently hospitalized for severe COVID 19 requiring intubation  He was eventually extubated however on 2/1/21 sats dropped and he was started on cefipime and flagyl for pneumonia concern  The evening of 2/1, patient had witnessed tonic-clonic seizure activity, was intubated for airway protection, and received Ativan 2 mg, propofol, succinylcholine, and etomidate with cessation of seizure after approximately 9 minutes  Patient was loaded with Keppra 2 g and started on maintenance Keppra 1 g BID  CT head showed stable encephalomalacia in the bilateral posterior parietal lobes, left anterior frontal lobe, and left cerebellum, but no hemorrhage or acute intracranial abnormalities  keppra decreased to 750mg BID due to compromised renal function  Repeat CTH stable  Routine EEG showed diffuse cerebral dysfunction, non-specific  No epileptiform discharges  Etiology for seizure felt to be secondary to having a lowered seizure threshold from multiple prior strokes, current COVID-19 infection, transient hypoxia, and use of cefepime  Neurology did recommended discharging on Keppra 750mg BID  He is currently back at his nursing facility after a prolonged hospitalization  Nursing staff reports he has improved (less sedated, more interactive), although he is depressed (psych was following in the hospital)  He was interestingly not discharged on Keppra  He has not been taking the medication since his discharge on 3/4/21 and no events concerning for seizure      Discussed that he had a single seizure that was likely provoked due to severe illness, transient hypoxia, possibly cefepime use contributing as well  Would not recommend going back on an AED at this time since there have not been any seizure off AED  Due to his history of prior strokes is at higher risk for seizures and should be cautious to avoid lowering the seizure threshold  From a stroke standpoint, no concerns for recurrent TIA/CVA  He is now on Eliquis 2 5mg BID along with ASA and statin, previously not on AC due to GIB  Eliquis was just started during recent hospitalization  No concern for bleeding  Could consider going to 5mg BID of Eliquis and discontinuing ASA, but will leave as is for now  He can also follow up with his cardiologist     Plan:  -will remain off AED at this time  If any further seizures, would likely re-start AED and continue  -for secondary stroke prevention, continue Eliquis 2 5mg BID, ASA 81mg daily and Lipitor 40mg daily  -will defer management of blood pressure, cholesterol and blood sugar to the PCP  -continue to follow with cardiology for Afib management   -therapies and supportive care at his facility  -follow up in 6 months or sooner if needed    Signs and symptoms of stroke discussed  In addition if he were to have a fall and strike his head, he should be seen in the ED urgently for evaluation due to use of anticoagulation          Problem List Items Addressed This Visit        Cardiovascular and Mediastinum    Atrial fibrillation (HCC) (Chronic)       Other    History of stroke - Primary (Chronic)    Seizure ([facility] )      Other Visit Diagnoses     Seizure-like activity ([facility] )                   Reason for visit is   Chief Complaint   Patient presents with    Virtual Regular Visit        Encounter provider Milton Anne PA-C    Provider located at [address] [id number]      Recent Visits  Date Type Provider Dept   04/20/21 [address] [place] [address], [address], [facility] recent visits within past 7 days and meeting all other requirements     Future Appointments  No visits were found meeting these conditions  Showing future appointments within next 150 days and meeting all other requirements        The patient was identified by name and date of birth  Eduard Corral was informed that this is a telemedicine visit and that the visit is being conducted through [email or web address] and patient was informed that this is a secure, HIPAA-compliant platform  He agrees to proceed     My office door was closed  No one else was in the room  He acknowledged consent and understanding of privacy and security of the video platform  The patient has agreed to participate and understands they can discontinue the visit at any time  Patient is aware this is a billable service  Subjective    HPI   Eduard Corral is a 57 y o  male with history of multiple prior strokes with residual left arm weakness and bilateral LE weakness, atrial fibrillation previously not on anticoagulation due to history of GI bleeding, HTN, and HLD who presents today for a hospital follow up  Patient has previously followed in our outpatient clinic, last seen in 2019, regarding history of prior multifocal embolic strokes  In early 2019 he had a prolonged hospitalization for septic and cardiogenic shock, gram positive bacteremia, respiratory failure, renal failure, NSTEMI, and new stroke  Stroke felt to be cardioembolic due to AFib  He was initially placed on anticoagulation, but had GI bleeding and was taken off AC and placed on ASA  There was discussion of a Watchman  He has followed with cardiology in the outpatient setting and declined Watchman  He has been maintained on ASA  Following his 2019 hospitalization he has remained at [facility]  Patient more recently presented to [facility] ED on 1/17/2021 from [address] due to respiratory distress, fever, AMS, and COVID positive status    Patient had received his first COVID vaccine on January 8th  Due to recent outbreak of COVID at his nursing facility, the patients are swabbed every day  His COVID swab came back positive on 1/16/2021  Patient had been coughing for several weeks and on the 17th, became progressively encephalopathic  When he arrived at the [facility] ED, patient was sedated on ketamine and rocuronium and intubated due to hypercapnia and hypoxia  Approximately 1 hour later, patient became restless requiring propofol gtt  However, had subsequent hypotension, so was started on norepinephrine  Patient was transferred to [facility] for further management  Patient was started on severe protocol for COVID-19 ventilator settings, and was successfully extubated on 1/18  He received convalescent plasma on 1/17  Patient was transferred out of the ICU  Patient was found to be bacteremic with mixed gram positive blood cultures and was started on IV vancomycin per ID  However, appeared to be mixed contaminants, so antibiotics discontinued on 1/21  Nephrology was consulted due to PATRICK, likely multifactorial from COVID-19/sepsis/hypotension and vancomycin  During his hospitalization, patient expressed passive death wishes, so psychiatry was following  On 1/24, patient was found to be obtunded with acute respiratory acidosis with hypercapnia and was on BiPAP or high flow nasal cannula  On 2/1/2021, patient had O2 sats in the mid 80s with course breath sounds, so patient was again placed on BiPAP transferred back to the ICU  Broad spectrum antibiotics (cefepime and Flagyl) started on 2/1/2021 due to concern for superimposed bacterial pneumonia  That evening, patient was noted to have tonic clonic seizure activity  Due to inability to protect his airway, patient was emergently intubated  Initially ativan 2 mg, propofol, and succinylcholine were pushed through peripheral IV without abatement of seizure    After etomidate was given, seizure stopped  (It appeared that the seizure lasted for approximately 9 minutes per neurologys chart review of times medications were given)  Patient was loaded with Keppra 2 g and maintained on Keppra 1000 mg BID  Patient had been on propofol gtt, but due to hypotension, was started on norepinephrine gtt  STAT CT head showed stable encephalomalacia in the bilateral posterior parietal lobs, left anterior frontal lobe, left cerebellum, but no hemorrhage or acute intracranial abnormalities  Neurology decreased the Keppra to 750mg BID due to compromised renal function  Repeat CTH the next day was stable  Routine EEG showed diffuse cerebral dysfunction of nonspecific etiology, no epileptiform discharges  Patient was eventually extubated on 2/16/21  He had not further seizures  Etiology of seizure felt to be due to lowered seizure threshold from multiple prior strokes, current COVID-19 infection, transient hypoxia, and use of cefepime  Neurology suggested continuing Keppra 750mg BID  The patient was also placed on Eliquis 2 5mg BID due to COVID protocol, in addition to his ASA 81mg daily and Lipitor 40mg daily, which he had been maintained on in the outpatient setting  Patient was eventually discharged back to [facility] on 3/4/21  Today, patient appears on video from his nursing facility  I was also able to speak with his nurse Junious Grief  Junious Grief says he has been stable  Interestingly, he was never discharged from the hospital on [address]  I reviewed the discharge summary where it states he will be discharged on the medication, however the discharge med list does not include Keppra  Per Junious Grief, there have been no events concerning for breakthrough seizure  He is still on Eliquis 2 5mg BID along with ASA, and no issues with bleeding  Patient himself is frustrated and says he has given up on therapy  As above, had issues with depression and passive death wishes and was followed by psych    Junious Grief says this is still the case and PCP following at the facility  Willie Barney says he has seemed better in the last few weeks as he continues to recover, not as lethargic  Past Medical History:   Diagnosis Date    Allergic rhinitis     last assessed 9/12/12    Finger fracture, right     Closed fx of the middle phalanx of the right 5th finger  last assessed 1/30/14    GI bleed 2/22/2019    Hemorrhoids     last assessed 2/10/14    Hyperlipidemia     Hypertension     Stroke [facility])        Past Surgical History:   Procedure Laterality Date    AORTIC VALVE REPLACEMENT  07/30/2014    AVR with 25mm [facility] Ease bovine pericardial valve    CARDIAC CATHETERIZATION  07/18/2014    SLB left main-normal, circumflex-normal, ramus intermedius-normal, RCA was large and dominant giving rise to the PDA & a large posterolateral branch  No disease  last assessed 8/19/14     COLONOSCOPY N/A 2/25/2019    Procedure: COLONOSCOPY;  Surgeon: Marizol Cardoza DO;  Location: [facility]; Service: Gastroenterology    ESOPHAGOGASTRODUODENOSCOPY N/A 2/22/2019    Procedure: ESOPHAGOGASTRODUODENOSCOPY (EGD)-roadshow overnight;  Surgeon: Marizol Cardoza DO;  [facility]: [facility]; Service: Gastroenterology    ESOPHAGOGASTRODUODENOSCOPY N/A 2/23/2019    Procedure: ESOPHAGOGASTRODUODENOSCOPY (EGD); Surgeon: Daljit Morales MD;  [facility]: [facility]; Service: Gastroenterology    ESOPHAGOGASTRODUODENOSCOPY N/A 2/25/2019    Procedure: ESOPHAGOGASTRODUODENOSCOPY (EGD); Surgeon: Marizol Cardoza DO;  [facility];   Service: Gastroenterology    IR NON-TUNNELED CENTRAL LINE PLACEMENT  3/1/2019    IR NON-TUNNELED CENTRAL LINE PLACEMENT  2/4/2019    IR TUNNELED DIALYSIS CATHETER CHECK/CHANGE/REPOSITION/ANGIOPLASTY  4/18/2019    IR TUNNELED DIALYSIS CATHETER PLACEMENT  2/4/2019    IR TUNNELED DIALYSIS CATHETER PLACEMENT  2/18/2019    KNEE ARTHROSCOPY      KNEE CARTILAGE SURGERY Left     medial meniscus tear     TESTICLE SURGERY      TONSILLECTOMY AND ADENOIDECTOMY      TOOTH EXTRACTION         Current Outpatient Medications   Medication Sig Dispense Refill    acetaminophen (TYLENOL) 325 mg tablet Take 2 tablets (650 mg total) by mouth every 6 (six) hours as needed for mild pain 30 tablet 0    albuterol (ProAir HFA) 90 mcg/act inhaler Inhale 2 puffs every 6 (six) hours as needed for wheezing or shortness of breath 8 5 g 6    allopurinol (ZYLOPRIM) 100 mg tablet Take 100 mg by mouth daily      apixaban (Eliquis) 2 5 mg Take 2 5 mg by mouth 2 (two) times a day      aspirin (ECOTRIN LOW STRENGTH) 81 mg EC tablet Take 1 tablet (81 mg total) by mouth daily  0    atorvastatin (LIPITOR) 40 mg tablet Take 1 tablet (40 mg total) by mouth daily with dinner  0    b complex-vitamin C-folic acid (NEPHROCAPS) 1 mg capsule Take 1 capsule by mouth daily with dinner  0    bumetanide (BUMEX) 1 mg tablet Take 2 mg by mouth daily       diltiazem (CARDIZEM CD) 180 mg 24 hr capsule Take 1 capsule (180 mg total) by mouth daily  0    enoxaparin (LOVENOX) 40 mg/0 4 mL Inject 40 mg under the skin daily      hydrOXYzine HCL (ATARAX) 25 mg tablet Take 12 5 mg by mouth every 8 (eight) hours as needed for itching      magnesium oxide (MAG-OX) 400 mg Take 1 tablet (400 mg total) by mouth daily  0    Melatonin 3 MG CAPS Take 3 mg by mouth      metoprolol tartrate (LOPRESSOR) 50 mg tablet Take 1 tablet (50 mg total) by mouth 3 (three) times a day  0    pantoprazole (PROTONIX) 40 mg tablet Take 1 tablet (40 mg total) by mouth 2 (two) times a day before meals (Patient taking differently: Take 40 mg by mouth 2 (two) times a day )  0    POTASSIUM CHLORIDE PO Take 40 mEq by mouth 2 (two) times a day      sertraline (ZOLOFT) 100 mg tablet Take 100 mg by mouth daily      sertraline (ZOLOFT) 25 mg tablet Take 1 tablet (25 mg total) by mouth daily after dinner  0    traMADol (ULTRAM) 50 mg tablet Take 50 mg by mouth every 6 (six) hours as needed for moderate pain  bisacodyl (DULCOLAX) 10 mg suppository Insert 1 suppository (10 mg total) into the rectum daily as needed for constipation (Patient not taking: Reported on 1/17/2021) 12 suppository 0    midodrine (PROAMATINE) 10 MG tablet Take 1 tablet (10 mg total) by mouth every 8 (eight) hours 90 tablet 0    Probiotic Product (BACID PO) Take by mouth       No current facility-administered medications for this visit  Allergies   Allergen Reactions    Amiodarone      Developed Rash    Penicillins Rash     However, has subsequently tolerated Cefazolin and Cefepime       Review of Systems   Constitutional: Negative  Negative for appetite change and fever  HENT: Positive for trouble swallowing (slight)  Negative for hearing loss, tinnitus and voice change  Eyes: Negative  Negative for photophobia and pain  Respiratory: Negative  Negative for shortness of breath  Cardiovascular: Negative  Negative for palpitations  Gastrointestinal: Negative  Negative for nausea and vomiting  Endocrine: Negative  Negative for cold intolerance  Genitourinary: Negative  Negative for dysuria, frequency and urgency  Musculoskeletal: Positive for gait problem  Negative for myalgias and neck pain  Doesn't stand or walk     Skin: Negative  Negative for rash  Allergic/Immunologic: Negative  Neurological: Positive for tremors (from time to time) and weakness (legs)  Negative for dizziness, seizures, syncope, facial asymmetry, speech difficulty, light-headedness, numbness and headaches  Hematological: Bruises/bleeds easily (Bruise)  Psychiatric/Behavioral: Positive for confusion (At times) and sleep disturbance (Insomnia)  Negative for hallucinations  All other systems reviewed and are negative        Video Exam    Vitals:    04/20/21 1024   BP: 130/72   BP Location: Left arm   Patient Position: Sitting   Cuff Size: Standard       Physical Exam  Constitutional:       Comments: Lying in bed, no acute distress   HENT:      Head: Normocephalic and atraumatic  Neurological:      Mental Status: He is alert  Comments: Mental status: AO x 3, able to follow commands, no dysarthria or aphasia    CN 1: not tested  CN 2: not tested  CN 3, 4, 6: no noticeable palsy, EOMs intact  CN 5: not tested  CN 7: face symmetrical  CN 8: hearing intact to voice  CN 9: not tested  CN 10: not tested  CN 11: shoulder shrug symmetric   CN 12: tongue midline     Motor:  Able to lift both arms symmetrically and perform overhead movements, able to lift right leg up off the bed, left leg weaker and in a boot, more difficulty lifting off the bed  No abnormal movements appreciated     Coordination:  No dysmetria on FTN    Psychiatric:      Comments: Mood seems depressed based on the way he describes "giving up" on therapy          I spent 20 minutes directly with the patient during this visit      VIRTUAL VISIT Emily acknowledges that he has consented to an online visit or consultation  He understands that the online visit is based solely on information provided by him, and that, in the absence of a face-to-face physical evaluation by the physician, the diagnosis he receives is both limited and provisional in terms of accuracy and completeness  This is not intended to replace a full medical face-to-face evaluation by the physician  Lay Mendoza understands and accepts these terms

## 2021-04-20 NOTE — PATIENT INSTRUCTIONS
Continue current medications, no changes being made  Please contact the office if there are any events concerning for seizure  He is currently not on any anti-seizure medication  It was felt that the seizure he had in the hospital was provoked due to severe illness, metabolic derangements and medications  Will defer management of blood pressure, lipids and blood sugar to PCP  Continue to follow with cardiology for management of Afib  Therapies and supportive care at his facilities as needed  Follow up in 6 months  Call for any new symptoms     If patient experiences any facial droop, weakness on one side of the body, speech or swallowing difficulty, painless loss of vision in one eye, double vision, vertigo that does not resolve quickly/imbalance, go to the ER/call 911

## 2021-05-21 NOTE — PROGRESS NOTES
Progress Note - Marcellus Bloom 1960, 63 y o  male MRN: [id number]    Unit/Bed#: S -01 Encounter: [id number]    Primary Care Provider: Carlos Camejo DO   Date and time admitted to hospital: 1/17/2021  4:16 PM    * Acute respiratory failure with hypoxia and hypercarbia [facility])  Assessment & Plan  · Patient presented with acute hypoxic and hypercapnic respiratory failure requiring intubation and mechanical ventilation secondary to COVID-19 pneumonia  · Was extubated 1/18 and transferred out of ICU 1/20  · Obtunded 1/24, stat ABG showed acute respiratory acidosis with hypercapnia  Patient likely also has a component of obesity hypoventilation syndrome/ANGELINA that is undiagnosed given his morbid obesity/body habitus  · Became acutely worse this morning with O2 sats in the mid 80s on max high-flow O2 with non-rebreather mask  · Lungs revealed coarse breath sounds bilaterally with rales  He received 60 mg IV Lasix x1 dose, chest x-ray ordered  · Critical care consult obtain  Plan to transfer step-down level 1 under critical care service  · Continue BiPAP  Might require re-intubation    Pneumonia due to COVID-19 virus  Assessment & Plan  · Patient confirmed [id number] positive 01/16/2021  Initially presented to [address] [facility] [facility] with respiratory distress and altered mental status  While at [facility], was intubated due to hypercapnia and hypoxia  He became hypotensive requiring pressor therapy and was transferred to [facility] for further critical care management of severe COVID-19 infection  · Has been on severe COVID treatment pathway  · Patient received 1st COVID-19 vaccine 1/8  · Chest CT with minimal ground-glass opacities and left lower lobe consolidation concerning for superimposed bacterial process  · Received convalescent plasma 1/17  · Continue vitamin cocktail, on q daily Decadron  · Patient became increasingly more hypoxic earlier this morning    He was 86% on max high-flow with non-rebreather mask  Placed on BiPAP  Might require re-intubation  · Patient currently remains full code  Discussed with his spouse who confirms code status and wants everything done to get him better  · Stat repeat chest x-ray ordered  Will give Lasix 60 mg IV 1 dose now  · Transfer to step-down level 1 under critical care service  Discussed with critical care AP  Patient will go under service of Dr Gin Landaverde    Acute kidney injury superimposed on CKD [facility])  Assessment & Plan  · POA, likely multifactorial 2/2 ATN in setting of COVID-19 + contrast induced nephropathy + vanco toxicity  Baseline creatinine 1 0-1 3  · Creatinine has plateaued around 2 0-8 6 in the last several days  · Has Turner catheter in place for close monitoring of I's and O's  · Currently on Bumex 2 mg twice daily  He received albumin yesterday  · Has been > 10 L negative since admission  · Nephrology following  Appreciate input    Acute metabolic encephalopathy  Assessment & Plan  · Secondary to hypoxia and hypercapnia with history of stroke and residual left-sided weakness, patient initially was intubated at [facility] for both hypoxia and hypercapnia and was extubated in ICU here at [facility]  · Mental status has improved overall  Continue to monitor closely  · Patient appears to have severe depression  He has been at the nursing home for a prolonged period of time and family/spouse has not been able to see him since March of 2020  · Will continue supportive care  Consider psych eval once respiratory status improves  · Melatonin q h s  Hematuria  Assessment & Plan  · Currently resolved    Hemoglobin has been relatively stable  · Turner catheter in place draining dark hollie urine  · Continue to monitor    Essential hypertension  Assessment & Plan  · Blood pressure currently stable  · Monitor closely and avoid hypotension    Bacteremia  Assessment & Plan  · Mixed Gram Positive Blood cultures   Admission blood cultures were drawn on the 17th at [facility] with 1 of 2 sets growing 2 colony types of coag-negative staph   Repeat blood cultures were drawn on arrival to [address] [place] [address] 1 of 2 blood cultures here are growing Enterococcus faecalis and coag-negative staph  Blood culture contaminants highly suspected with 3 different colony types of CNS and 1 of Enterococcus in same set as CNS and known difficult IV/blood draw access  · Has hx of AVR 2014, TTE negative for vegetations  · 1/19/21 blood cultures negative   · Continue to monitor off abx   · Infectious Disease input noted  · Surveillance blood cultures with Infectious Disease     Atrial fibrillation ([address] )  Assessment & Plan  · Rate controlled on Cardizem and metoprolol  · Per last cardiology note 10/2020, pt was not on AC due to life threatening GI bleed  Dr Josi Jacobo has spoken with Pt on multiple occasions about Watchman device pt has been declining  Morbid obesity   Assessment & Plan  · BMI 57  · Recommend therapeutic lifestyle changes to promote weight loss      VTE Pharmacologic Prophylaxis:   Pharmacologic: Enoxaparin (Lovenox)  Mechanical VTE Prophylaxis in Place: Yes    Patient Centered Rounds: I have performed bedside rounds with nursing staff today  Discussions with Specialists or Other Care Team Provider:  Nursing/CC AP    Education and Discussions with Family / Patient:  Patient/updated patient's spouse on phone regarding declining respiratory status and need for transferred to the critical care unit  All questions answered and concerns addressed  She confirms full code status and wants everything done to help get him better      Current Length of Stay: 15 day(s)    Current Patient Status: Inpatient   Certification Statement: The patient will continue to require additional inpatient hospital stay due to Above diagnosis and care plan    Discharge Plan:  Not medically stable for discharge    Code Status: Level 1 - Full Code      Subjective:   Patient urgently evaluated this morning due to declining respiratory status  He has become more short of breath and hypoxic  On my evaluation, he is awake and responsive  He is conversationally dyspneic and with labored respirations  He denies chest pain    Objective:     Vitals:   Temp (24hrs), Av 3 °F (36 8 °C), Min:98 1 °F (36 7 °C), Max:98 4 °F (36 9 °C)    Temp:  [98 1 °F (36 7 °C)-98 4 °F (36 9 °C)] 98 1 °F (36 7 °C)  HR:  [74-77] 75  Resp:  [20] 20  BP: (102-127)/(56-67) 127/59  SpO2:  [85 %-93 %] 89 %  Body mass index is 56 64 kg/m²  Input and Output Summary (last 24 hours): Intake/Output Summary (Last 24 hours) at 2021 0935  Last data filed at 2021 1755  Gross per 24 hour   Intake 960 ml   Output 1000 ml   Net -40 ml       Physical Exam:  General Appearance:    Alert, cooperative, moderate distress, appropriately responsive    Head:    Normocephalic, mucous membranes moist   Eyes:    Conjunctiva/corneas clear   Neck:   Supple   Lungs:     Coarse breath sounds bilaterally with rhonchi and bilateral rales, labored respiration    Heart:    Regular rate and rhythm, S1 and S2    Abdomen:     Soft, obese, nontender   Extremities:   2+ bilateral lower extremity edema up to thighs   Neurologic:  Follows commands, speech is clear, responds appropriately         Additional Data:     Labs:    Results from last 7 days   Lab Units 21  0629   WBC Thousand/uL 6 58   HEMOGLOBIN g/dL 11 1*   HEMATOCRIT % 39 3   PLATELETS Thousands/uL 195   NEUTROS PCT % 91*   LYMPHS PCT % 4*   MONOS PCT % 3*   EOS PCT % 0     Results from last 7 days   Lab Units 21  0541  21  0629   POTASSIUM mmol/L 4 2   < > 4 2   CHLORIDE mmol/L 105   < > 104   CO2 mmol/L 34*   < > 31   BUN mg/dL 64*   < > 57*   CREATININE mg/dL 1 94*   < > 2 12*   CALCIUM mg/dL 8 3   < > 8 0*   ALK PHOS U/L  --   --  58   ALT U/L  --   --  27   AST U/L  --   --  21    < > = values in this interval not displayed  * I Have Reviewed All Lab Data Listed Above    * Additional Pertinent Lab Tests Reviewed: [address] Admission Reviewed    Cultures:   Blood Culture:   Lab Results   Component Value Date    BLOODCX No Growth After 5 Days  01/19/2021    BLOODCX No Growth After 5 Days  01/19/2021    BLOODCX Enterococcus faecalis (A) 01/17/2021    BLOODCX Staphylococcus haemolyticus (A) 01/17/2021    BLOODCX No Growth After 5 Days  01/17/2021    BLOODCX No Growth After 5 Days   01/17/2021    BLOODCX Staphylococcus hominis (A) 01/17/2021    BLOODCX Staphylococcus epidermidis (A) 01/17/2021     Urine Culture:   Lab Results   Component Value Date    URINECX 7209-1170 cfu/ml Gram Positive Cocci (A) 01/23/2019     Sputum Culture: No components found for: SPUTUMCX  Wound Culture: No results found for: WOUNDCULT    Last 24 Hours Medication List:   Current Facility-Administered Medications   Medication Dose Route Frequency Provider Last Rate    acetaminophen  650 mg Oral Q6H PRN Yareli Haynes PA-C      aspirin  81 mg Oral Daily Yareli Haynes PA-C      atorvastatin  40 mg Oral HS KATHY Hathaway      bisacodyl  10 mg Rectal Daily PRN Yareli Haynes PA-C      bumetanide  2 mg Oral BID Alonzo Horta DO      chlorhexidine  15 mL Swish & Spit Q12H [facility] Yareli Haynes PA-C      cholecalciferol  2,000 Units Per NG Tube Daily Yareli Haynes PA-C      dexamethasone  6 mg Intravenous Daily Lionel Robbins PA-C      diltiazem  90 mg Oral Q12H [facility] Snony Dias MD      docusate sodium  100 mg Oral BID Orien Chester, KENIANP      enoxaparin  40 mg Subcutaneous Q24H [facility] Lionel Robbins PA-C      melatonin  3 mg Oral HS PRN Orien Chester, CRJOSE DANIEL      methocarbamol  500 mg Oral Q8H PRN Orien Chester, KATHY      metoprolol  5 mg Intravenous Q6H PRN Sonny Dias MD      metoprolol tartrate  50 mg Oral TID Nicole Joshua PA-C      multivitamin with iron-minerals  15 mL Per NG Tube Daily Yareli Haynes PA-C      pantoprazole 40 mg Oral Early Morning Yareli Haynes PA-C      polyethylene glycol  17 g Oral BID KATHY Giron      senna-docusate sodium  1 tablet Oral HS Robert Guzman PA-C      sertraline  125 mg Oral Daily Augustine Gray MD      sodium chloride  1,000 mL Intravenous Once Blas Spear PA-C      traMADol  50 mg Oral Once KATHY Giron          Today, Patient Was Seen By: Michelle Sheth MD    ** Please Note: Dragon 360 Dictation voice to text software may have been used in the creation of this document   ** Hemigard Postcare Instructions: The HEMIGARD strips are to remain completely dry for at least 5-7 days.

## 2021-06-30 ENCOUNTER — OFFICE VISIT (OUTPATIENT)
Dept: PULMONOLOGY | Facility: CLINIC | Age: 61
End: 2021-06-30
Payer: COMMERCIAL

## 2021-06-30 VITALS
HEART RATE: 65 BPM | OXYGEN SATURATION: 99 % | DIASTOLIC BLOOD PRESSURE: 57 MMHG | TEMPERATURE: 97.5 F | SYSTOLIC BLOOD PRESSURE: 113 MMHG

## 2021-06-30 DIAGNOSIS — R60.0 LOWER EXTREMITY EDEMA: ICD-10-CM

## 2021-06-30 DIAGNOSIS — E66.2 OBESITY HYPOVENTILATION SYNDROME (HCC): ICD-10-CM

## 2021-06-30 DIAGNOSIS — J96.11 CHRONIC HYPOXEMIC RESPIRATORY FAILURE (HCC): Primary | ICD-10-CM

## 2021-06-30 PROCEDURE — 99214 OFFICE O/P EST MOD 30 MIN: CPT | Performed by: INTERNAL MEDICINE

## 2021-06-30 NOTE — ASSESSMENT & PLAN NOTE
Luis Antoniojerri Govea is here for a compliance check on his obesity hypoventilation and sleep apnea  He claims he is wearing his BiPAP all night every night however the nursing home was unable to provide me with compliance data for review  I will review it when it is made available to me and make comments on whether we need to change his settings

## 2021-06-30 NOTE — ASSESSMENT & PLAN NOTE
Leila Bliss has mild pitting edema in his lower extremities but otherwise looks fairly compensated with regards to his volume status on Bumex 2 mg daily

## 2021-06-30 NOTE — PROGRESS NOTES
Assessment/Plan:    Chronic hypoxemic respiratory failure (HCC)   Jayde Rivera is still hypoxic on room air at 85%, I added supplemental oxygen and his saturations increased to 95% on 3 L  I suggested decreasing his oxygen to 2 L at rest and 3 L with exertion  Given his continued hypoxia will get a chest x-ray as follow-up for his severe COVID infection  Obesity hypoventilation syndrome ([address] )   Jayde Rivera is here for a compliance check on his obesity hypoventilation and sleep apnea  He claims he is wearing his BiPAP all night every night however the nursing home was unable to provide me with compliance data for review  I will review it when it is made available to me and make comments on whether we need to change his settings  Lower extremity edema    Jayde Rivera has mild pitting edema in his lower extremities but otherwise looks fairly compensated with regards to his volume status on Bumex 2 mg daily  Diagnoses and all orders for this visit:    Chronic hypoxemic respiratory failure (HCC)    Obesity hypoventilation syndrome (HCC)    Lower extremity edema          Subjective:      Patient ID: Brooklyn Ray is a 57 y o  male  Jayde Rivera is here from the nursing home for follow-up of his hypoxic respiratory failure and obesity hypoventilation sleep apnea  He is not short of breath denies any complaints  Unfortunately he has been bed-bound for the past 3 months since her prolonged hospitalization for COVID  The following portions of the patient's history were reviewed and updated as appropriate: allergies, current medications, past family history, past medical history, past social history, past surgical history and problem list     Review of Systems   Constitutional: Negative  HENT: Negative  Eyes: Negative  Respiratory: Negative for shortness of breath  Cardiovascular: Negative  Gastrointestinal: Negative  Endocrine: Negative  Genitourinary: Negative      Allergic/Immunologic: Negative  Neurological: Negative  Hematological: Negative  Psychiatric/Behavioral: Negative  Objective:      /57   Pulse 65   Temp 97 5 °F (36 4 °C) (Tympanic)   SpO2 99%          Physical Exam  Constitutional:       Appearance: He is well-developed  HENT:      Head: Normocephalic  Eyes:      Pupils: Pupils are equal, round, and reactive to light  Cardiovascular:      Rate and Rhythm: Normal rate  Pulmonary:      Effort: Pulmonary effort is normal  No respiratory distress  Breath sounds: No wheezing or rales  Abdominal:      Palpations: Abdomen is soft  Musculoskeletal:         General: Normal range of motion  Cervical back: Neck supple  Skin:     General: Skin is warm and dry  Neurological:      Mental Status: He is alert and oriented to person, place, and time

## 2021-06-30 NOTE — ASSESSMENT & PLAN NOTE
Waycross Scooter is still hypoxic on room air at 85%, I added supplemental oxygen and his saturations increased to 95% on 3 L  I suggested decreasing his oxygen to 2 L at rest and 3 L with exertion  Given his continued hypoxia will get a chest x-ray as follow-up for his severe COVID infection

## 2021-07-14 ENCOUNTER — OFFICE VISIT (OUTPATIENT)
Dept: CARDIOLOGY CLINIC | Facility: HOSPITAL | Age: 61
End: 2021-07-14
Payer: MEDICARE

## 2021-07-14 VITALS
WEIGHT: 315 LBS | OXYGEN SATURATION: 98 % | SYSTOLIC BLOOD PRESSURE: 124 MMHG | BODY MASS INDEX: 47.73 KG/M2 | DIASTOLIC BLOOD PRESSURE: 76 MMHG | HEART RATE: 65 BPM

## 2021-07-14 DIAGNOSIS — I44.7 LEFT BUNDLE BRANCH BLOCK: Chronic | ICD-10-CM

## 2021-07-14 DIAGNOSIS — I51.81 STRESS-INDUCED CARDIOMYOPATHY: Chronic | ICD-10-CM

## 2021-07-14 DIAGNOSIS — Z95.3 HISTORY OF AORTIC VALVE REPLACEMENT WITH BIOPROSTHETIC VALVE: Chronic | ICD-10-CM

## 2021-07-14 DIAGNOSIS — I48.0 PAROXYSMAL ATRIAL FIBRILLATION (HCC): Primary | Chronic | ICD-10-CM

## 2021-07-14 DIAGNOSIS — E66.2 OBESITY HYPOVENTILATION SYNDROME (HCC): ICD-10-CM

## 2021-07-14 PROCEDURE — 99214 OFFICE O/P EST MOD 30 MIN: CPT | Performed by: INTERNAL MEDICINE

## 2021-07-14 RX ORDER — MELATONIN
2000 DAILY
COMMUNITY

## 2021-08-11 ENCOUNTER — APPOINTMENT (EMERGENCY)
Dept: CT IMAGING | Facility: HOSPITAL | Age: 61
DRG: 871 | End: 2021-08-11
Payer: MEDICARE

## 2021-08-11 ENCOUNTER — HOSPITAL ENCOUNTER (INPATIENT)
Facility: HOSPITAL | Age: 61
LOS: 8 days | Discharge: NURSING, REHAB OR LONG-TERM CARE | DRG: 871 | End: 2021-08-19
Attending: EMERGENCY MEDICINE | Admitting: FAMILY MEDICINE
Payer: MEDICARE

## 2021-08-11 ENCOUNTER — APPOINTMENT (EMERGENCY)
Dept: RADIOLOGY | Facility: HOSPITAL | Age: 61
DRG: 871 | End: 2021-08-11
Payer: MEDICARE

## 2021-08-11 DIAGNOSIS — N17.9 AKI (ACUTE KIDNEY INJURY) (HCC): ICD-10-CM

## 2021-08-11 DIAGNOSIS — R06.89 HYPERCARBIA: ICD-10-CM

## 2021-08-11 DIAGNOSIS — J18.9 PNEUMONIA: ICD-10-CM

## 2021-08-11 DIAGNOSIS — L27.0 DRUG RASH: ICD-10-CM

## 2021-08-11 DIAGNOSIS — N30.01 ACUTE CYSTITIS WITH HEMATURIA: ICD-10-CM

## 2021-08-11 DIAGNOSIS — I95.9 HYPOTENSION: ICD-10-CM

## 2021-08-11 DIAGNOSIS — R65.21 SEPTIC SHOCK (HCC): ICD-10-CM

## 2021-08-11 DIAGNOSIS — R09.02 HYPOXIA: Primary | ICD-10-CM

## 2021-08-11 DIAGNOSIS — N17.9 ACUTE KIDNEY INJURY SUPERIMPOSED ON CHRONIC KIDNEY DISEASE (HCC): ICD-10-CM

## 2021-08-11 DIAGNOSIS — R41.0 CONFUSION: ICD-10-CM

## 2021-08-11 DIAGNOSIS — N18.9 ACUTE KIDNEY INJURY SUPERIMPOSED ON CHRONIC KIDNEY DISEASE (HCC): ICD-10-CM

## 2021-08-11 DIAGNOSIS — A41.9 SEPTIC SHOCK (HCC): ICD-10-CM

## 2021-08-11 DIAGNOSIS — J18.9 MULTIFOCAL PNEUMONIA: ICD-10-CM

## 2021-08-11 PROBLEM — I50.33 ACUTE ON CHRONIC DIASTOLIC (CONGESTIVE) HEART FAILURE (HCC): Status: ACTIVE | Noted: 2021-08-11

## 2021-08-11 PROBLEM — J96.21 ACUTE ON CHRONIC RESPIRATORY FAILURE WITH HYPOXIA AND HYPERCAPNIA (HCC): Status: ACTIVE | Noted: 2021-03-10

## 2021-08-11 PROBLEM — G93.41 METABOLIC ENCEPHALOPATHY: Status: ACTIVE | Noted: 2019-02-16

## 2021-08-11 PROBLEM — I48.0 PAROXYSMAL ATRIAL FIBRILLATION (HCC): Status: ACTIVE | Noted: 2021-01-18

## 2021-08-11 PROBLEM — J96.22 ACUTE ON CHRONIC RESPIRATORY FAILURE WITH HYPOXIA AND HYPERCAPNIA (HCC): Status: ACTIVE | Noted: 2021-03-10

## 2021-08-11 LAB
ALBUMIN SERPL BCP-MCNC: 3.4 G/DL (ref 3.5–5)
ALP SERPL-CCNC: 90 U/L (ref 46–116)
ALT SERPL W P-5'-P-CCNC: 13 U/L (ref 12–78)
AMMONIA PLAS-SCNC: 13 UMOL/L (ref 11–35)
ANION GAP SERPL CALCULATED.3IONS-SCNC: -1 MMOL/L (ref 4–13)
APTT PPP: 26 SECONDS (ref 23–37)
ARTERIAL PATENCY WRIST A: YES
AST SERPL W P-5'-P-CCNC: 9 U/L (ref 5–45)
ATRIAL RATE: 89 BPM
BACTERIA UR QL AUTO: ABNORMAL /HPF
BASE EXCESS BLDA CALC-SCNC: 14.5 MMOL/L
BASE EXCESS BLDA CALC-SCNC: 16 MMOL/L (ref -2–3)
BASOPHILS # BLD AUTO: 0.03 THOUSANDS/ΜL (ref 0–0.1)
BASOPHILS NFR BLD AUTO: 0 % (ref 0–1)
BILIRUB SERPL-MCNC: 0.47 MG/DL (ref 0.2–1)
BILIRUB UR QL STRIP: NEGATIVE
BUN SERPL-MCNC: 33 MG/DL (ref 5–25)
CALCIUM ALBUM COR SERPL-MCNC: 9.6 MG/DL (ref 8.3–10.1)
CALCIUM SERPL-MCNC: 9.1 MG/DL (ref 8.3–10.1)
CHLORIDE SERPL-SCNC: 102 MMOL/L (ref 100–108)
CLARITY UR: CLEAR
CO2 SERPL-SCNC: 43 MMOL/L (ref 21–32)
COLOR UR: YELLOW
CREAT SERPL-MCNC: 1.45 MG/DL (ref 0.6–1.3)
D DIMER PPP FEU-MCNC: 0.54 UG/ML FEU
EOSINOPHIL # BLD AUTO: 0.08 THOUSAND/ΜL (ref 0–0.61)
EOSINOPHIL NFR BLD AUTO: 1 % (ref 0–6)
ERYTHROCYTE [DISTWIDTH] IN BLOOD BY AUTOMATED COUNT: 19.7 % (ref 11.6–15.1)
GFR SERPL CREATININE-BSD FRML MDRD: 51 ML/MIN/1.73SQ M
GLUCOSE SERPL-MCNC: 101 MG/DL (ref 65–140)
GLUCOSE SERPL-MCNC: 97 MG/DL (ref 65–140)
GLUCOSE UR STRIP-MCNC: NEGATIVE MG/DL
HCO3 BLDA-SCNC: 42.8 MMOL/L (ref 22–28)
HCO3 BLDA-SCNC: 45.4 MMOL/L (ref 22–28)
HCT VFR BLD AUTO: 35.2 % (ref 36.5–49.3)
HCT VFR BLD CALC: 34 % (ref 36.5–49.3)
HGB BLD-MCNC: 9.1 G/DL (ref 12–17)
HGB BLDA-MCNC: 11.6 G/DL (ref 12–17)
HGB UR QL STRIP.AUTO: ABNORMAL
IMM GRANULOCYTES # BLD AUTO: 0.04 THOUSAND/UL (ref 0–0.2)
IMM GRANULOCYTES NFR BLD AUTO: 1 % (ref 0–2)
INR PPP: 1.2 (ref 0.84–1.19)
IPAP: 14
KETONES UR STRIP-MCNC: NEGATIVE MG/DL
LACTATE SERPL-SCNC: 1.1 MMOL/L (ref 0.5–2)
LEUKOCYTE ESTERASE UR QL STRIP: ABNORMAL
LYMPHOCYTES # BLD AUTO: 0.43 THOUSANDS/ΜL (ref 0.6–4.47)
LYMPHOCYTES NFR BLD AUTO: 6 % (ref 14–44)
MAGNESIUM SERPL-MCNC: 2.6 MG/DL (ref 1.6–2.6)
MCH RBC QN AUTO: 22.1 PG (ref 26.8–34.3)
MCHC RBC AUTO-ENTMCNC: 25.9 G/DL (ref 31.4–37.4)
MCV RBC AUTO: 86 FL (ref 82–98)
MONOCYTES # BLD AUTO: 0.54 THOUSAND/ΜL (ref 0.17–1.22)
MONOCYTES NFR BLD AUTO: 7 % (ref 4–12)
NEUTROPHILS # BLD AUTO: 6.28 THOUSANDS/ΜL (ref 1.85–7.62)
NEUTS SEG NFR BLD AUTO: 85 % (ref 43–75)
NITRITE UR QL STRIP: POSITIVE
NON VENT- BIPAP: ABNORMAL
NON-SQ EPI CELLS URNS QL MICRO: ABNORMAL /HPF
NRBC BLD AUTO-RTO: 0 /100 WBCS
NT-PROBNP SERPL-MCNC: 2390 PG/ML
O2 CT BLDA-SCNC: 13.5 ML/DL (ref 16–23)
OXYHGB MFR BLDA: 96.5 % (ref 94–97)
P AXIS: 49 DEGREES
PCO2 BLD: 85.9 MM HG (ref 36–44)
PCO2 BLD: >45 MMOL/L (ref 21–32)
PCO2 BLDA: 79.8 MM HG (ref 36–44)
PEEP MAX SETTING VENT: 8 CM[H2O]
PH BLD: 7.33 [PH] (ref 7.35–7.45)
PH BLDA: 7.35 [PH] (ref 7.35–7.45)
PH UR STRIP.AUTO: 6 [PH]
PLATELET # BLD AUTO: 198 THOUSANDS/UL (ref 149–390)
PMV BLD AUTO: 11.9 FL (ref 8.9–12.7)
PO2 BLD: 68 MM HG (ref 75–129)
PO2 BLDA: 103.6 MM HG (ref 75–129)
POTASSIUM BLD-SCNC: 4.1 MMOL/L (ref 3.5–5.3)
POTASSIUM SERPL-SCNC: 4.7 MMOL/L (ref 3.5–5.3)
PR INTERVAL: 208 MS
PROT SERPL-MCNC: 7.6 G/DL (ref 6.4–8.2)
PROT UR STRIP-MCNC: ABNORMAL MG/DL
PROTHROMBIN TIME: 14.9 SECONDS (ref 11.6–14.5)
QRS AXIS: 42 DEGREES
QRSD INTERVAL: 148 MS
QT INTERVAL: 392 MS
QTC INTERVAL: 476 MS
RBC # BLD AUTO: 4.11 MILLION/UL (ref 3.88–5.62)
RBC #/AREA URNS AUTO: ABNORMAL /HPF
SAO2 % BLD FROM PO2: 90 % (ref 60–85)
SARS-COV-2 RNA RESP QL NAA+PROBE: NEGATIVE
SODIUM BLD-SCNC: 143 MMOL/L (ref 136–145)
SODIUM SERPL-SCNC: 144 MMOL/L (ref 136–145)
SP GR UR STRIP.AUTO: 1.01 (ref 1–1.03)
SPECIMEN SOURCE: ABNORMAL
SPECIMEN SOURCE: ABNORMAL
T WAVE AXIS: 107 DEGREES
TROPONIN I SERPL-MCNC: <0.02 NG/ML
UROBILINOGEN UR QL STRIP.AUTO: 0.2 E.U./DL
VENT BIPAP FIO2: 50 %
VENTRICULAR RATE: 89 BPM
WBC # BLD AUTO: 7.4 THOUSAND/UL (ref 4.31–10.16)
WBC #/AREA URNS AUTO: ABNORMAL /HPF

## 2021-08-11 PROCEDURE — 85025 COMPLETE CBC W/AUTO DIFF WBC: CPT | Performed by: PHYSICIAN ASSISTANT

## 2021-08-11 PROCEDURE — 99285 EMERGENCY DEPT VISIT HI MDM: CPT

## 2021-08-11 PROCEDURE — U0003 INFECTIOUS AGENT DETECTION BY NUCLEIC ACID (DNA OR RNA); SEVERE ACUTE RESPIRATORY SYNDROME CORONAVIRUS 2 (SARS-COV-2) (CORONAVIRUS DISEASE [COVID-19]), AMPLIFIED PROBE TECHNIQUE, MAKING USE OF HIGH THROUGHPUT TECHNOLOGIES AS DESCRIBED BY CMS-2020-01-R: HCPCS | Performed by: PHYSICIAN ASSISTANT

## 2021-08-11 PROCEDURE — 71045 X-RAY EXAM CHEST 1 VIEW: CPT

## 2021-08-11 PROCEDURE — 82947 ASSAY GLUCOSE BLOOD QUANT: CPT

## 2021-08-11 PROCEDURE — 84132 ASSAY OF SERUM POTASSIUM: CPT

## 2021-08-11 PROCEDURE — 84484 ASSAY OF TROPONIN QUANT: CPT | Performed by: PHYSICIAN ASSISTANT

## 2021-08-11 PROCEDURE — 83605 ASSAY OF LACTIC ACID: CPT | Performed by: PHYSICIAN ASSISTANT

## 2021-08-11 PROCEDURE — 36600 WITHDRAWAL OF ARTERIAL BLOOD: CPT

## 2021-08-11 PROCEDURE — 85730 THROMBOPLASTIN TIME PARTIAL: CPT | Performed by: PHYSICIAN ASSISTANT

## 2021-08-11 PROCEDURE — 85379 FIBRIN DEGRADATION QUANT: CPT | Performed by: PHYSICIAN ASSISTANT

## 2021-08-11 PROCEDURE — 81001 URINALYSIS AUTO W/SCOPE: CPT | Performed by: FAMILY MEDICINE

## 2021-08-11 PROCEDURE — 84295 ASSAY OF SERUM SODIUM: CPT

## 2021-08-11 PROCEDURE — 85610 PROTHROMBIN TIME: CPT | Performed by: PHYSICIAN ASSISTANT

## 2021-08-11 PROCEDURE — 99223 1ST HOSP IP/OBS HIGH 75: CPT | Performed by: FAMILY MEDICINE

## 2021-08-11 PROCEDURE — 36415 COLL VENOUS BLD VENIPUNCTURE: CPT | Performed by: PHYSICIAN ASSISTANT

## 2021-08-11 PROCEDURE — 93005 ELECTROCARDIOGRAM TRACING: CPT

## 2021-08-11 PROCEDURE — 82140 ASSAY OF AMMONIA: CPT | Performed by: PHYSICIAN ASSISTANT

## 2021-08-11 PROCEDURE — U0005 INFEC AGEN DETEC AMPLI PROBE: HCPCS | Performed by: PHYSICIAN ASSISTANT

## 2021-08-11 PROCEDURE — 94760 N-INVAS EAR/PLS OXIMETRY 1: CPT

## 2021-08-11 PROCEDURE — 85014 HEMATOCRIT: CPT

## 2021-08-11 PROCEDURE — 87449 NOS EACH ORGANISM AG IA: CPT | Performed by: FAMILY MEDICINE

## 2021-08-11 PROCEDURE — 83735 ASSAY OF MAGNESIUM: CPT | Performed by: PHYSICIAN ASSISTANT

## 2021-08-11 PROCEDURE — 83880 ASSAY OF NATRIURETIC PEPTIDE: CPT | Performed by: PHYSICIAN ASSISTANT

## 2021-08-11 PROCEDURE — 93010 ELECTROCARDIOGRAM REPORT: CPT | Performed by: INTERNAL MEDICINE

## 2021-08-11 PROCEDURE — 80053 COMPREHEN METABOLIC PANEL: CPT | Performed by: PHYSICIAN ASSISTANT

## 2021-08-11 PROCEDURE — 82805 BLOOD GASES W/O2 SATURATION: CPT | Performed by: PHYSICIAN ASSISTANT

## 2021-08-11 PROCEDURE — 99291 CRITICAL CARE FIRST HOUR: CPT | Performed by: PHYSICIAN ASSISTANT

## 2021-08-11 PROCEDURE — 82803 BLOOD GASES ANY COMBINATION: CPT

## 2021-08-11 PROCEDURE — 99255 IP/OBS CONSLTJ NEW/EST HI 80: CPT | Performed by: INTERNAL MEDICINE

## 2021-08-11 PROCEDURE — 84484 ASSAY OF TROPONIN QUANT: CPT | Performed by: FAMILY MEDICINE

## 2021-08-11 PROCEDURE — 94002 VENT MGMT INPAT INIT DAY: CPT

## 2021-08-11 PROCEDURE — 71250 CT THORAX DX C-: CPT

## 2021-08-11 PROCEDURE — 87081 CULTURE SCREEN ONLY: CPT | Performed by: FAMILY MEDICINE

## 2021-08-11 RX ORDER — CEFEPIME HYDROCHLORIDE 2 G/50ML
2000 INJECTION, SOLUTION INTRAVENOUS EVERY 12 HOURS
Status: DISCONTINUED | OUTPATIENT
Start: 2021-08-12 | End: 2021-08-16

## 2021-08-11 RX ORDER — BUMETANIDE 0.25 MG/ML
1 INJECTION, SOLUTION INTRAMUSCULAR; INTRAVENOUS 2 TIMES DAILY
Status: DISCONTINUED | OUTPATIENT
Start: 2021-08-11 | End: 2021-08-13

## 2021-08-11 RX ORDER — CHOLECALCIFEROL (VITAMIN D3) 10 MCG
1 TABLET ORAL
Status: DISCONTINUED | OUTPATIENT
Start: 2021-08-11 | End: 2021-08-19 | Stop reason: HOSPADM

## 2021-08-11 RX ORDER — ASPIRIN 81 MG/1
81 TABLET ORAL DAILY
Status: DISCONTINUED | OUTPATIENT
Start: 2021-08-12 | End: 2021-08-19 | Stop reason: HOSPADM

## 2021-08-11 RX ORDER — ATORVASTATIN CALCIUM 40 MG/1
40 TABLET, FILM COATED ORAL
Status: DISCONTINUED | OUTPATIENT
Start: 2021-08-11 | End: 2021-08-19 | Stop reason: HOSPADM

## 2021-08-11 RX ORDER — METOPROLOL TARTRATE 50 MG/1
50 TABLET, FILM COATED ORAL 3 TIMES DAILY
Status: DISCONTINUED | OUTPATIENT
Start: 2021-08-11 | End: 2021-08-16

## 2021-08-11 RX ORDER — SERTRALINE HYDROCHLORIDE 100 MG/1
100 TABLET, FILM COATED ORAL DAILY
Status: DISCONTINUED | OUTPATIENT
Start: 2021-08-12 | End: 2021-08-19 | Stop reason: HOSPADM

## 2021-08-11 RX ORDER — ALLOPURINOL 100 MG/1
100 TABLET ORAL DAILY
Status: DISCONTINUED | OUTPATIENT
Start: 2021-08-12 | End: 2021-08-19 | Stop reason: HOSPADM

## 2021-08-11 RX ORDER — CEFEPIME HYDROCHLORIDE 2 G/50ML
2000 INJECTION, SOLUTION INTRAVENOUS ONCE
Status: COMPLETED | OUTPATIENT
Start: 2021-08-11 | End: 2021-08-11

## 2021-08-11 RX ORDER — DILTIAZEM HYDROCHLORIDE 180 MG/1
180 CAPSULE, COATED, EXTENDED RELEASE ORAL DAILY
Status: DISCONTINUED | OUTPATIENT
Start: 2021-08-12 | End: 2021-08-14

## 2021-08-11 RX ORDER — ALBUTEROL SULFATE 2.5 MG/3ML
2.5 SOLUTION RESPIRATORY (INHALATION) EVERY 4 HOURS PRN
Status: DISCONTINUED | OUTPATIENT
Start: 2021-08-11 | End: 2021-08-19 | Stop reason: HOSPADM

## 2021-08-11 RX ORDER — PANTOPRAZOLE SODIUM 40 MG/1
40 TABLET, DELAYED RELEASE ORAL
Status: DISCONTINUED | OUTPATIENT
Start: 2021-08-11 | End: 2021-08-19 | Stop reason: HOSPADM

## 2021-08-11 RX ADMIN — CEFEPIME HYDROCHLORIDE 2000 MG: 2 INJECTION, SOLUTION INTRAVENOUS at 14:07

## 2021-08-11 RX ADMIN — VANCOMYCIN HYDROCHLORIDE 2000 MG: 1 INJECTION, POWDER, LYOPHILIZED, FOR SOLUTION INTRAVENOUS at 15:00

## 2021-08-11 RX ADMIN — BUMETANIDE 1 MG: 0.25 INJECTION, SOLUTION INTRAMUSCULAR; INTRAVENOUS at 17:10

## 2021-08-11 RX ADMIN — METRONIDAZOLE 500 MG: 500 INJECTION, SOLUTION INTRAVENOUS at 17:40

## 2021-08-11 NOTE — OCCUPATIONAL THERAPY NOTE
Occupational Therapy         Patient Name: Rachel Dietz  RBKPB'F Date: 8/11/2021 08/11/21 1502   OT Last Visit   OT Visit Date 08/11/21   Note Type   Note type Screen   Cancel Reasons Other     OT orders received and chart review completed  Per chart review patient is been a resident at [facility] for 2 years  Patient is bed bound, continent of bowel and bladder, and can feed himself  Recommend active participation in ADL w/ nursing staff as able in acute care  No acute OT needs at this time  Please re -consult if needs arise   Will complete OT orders at this time due to the same    [place], OT

## 2021-08-11 NOTE — ASSESSMENT & PLAN NOTE
Will hold cardizem due to hypotension  Continue metoprolol   Eliquis for [facility]

## 2021-08-11 NOTE — ASSESSMENT & PLAN NOTE
Wt Readings from Last 3 Encounters:   07/14/21 (!) 155 kg (342 lb 3 2 oz)   03/04/21 (!) 153 kg (338 lb 3 oz)   10/22/20 (!) 156 kg (344 lb)     Chronically on bumex 1mg BID ,   Day 2 Bumex 1 mg IV b i d   I&O, daily weights, fluid restrict  Renal function improving with diuresis

## 2021-08-11 NOTE — ED PROVIDER NOTES
History  Chief Complaint   Patient presents with    Shortness of Breath     pt comes in from [place] for increased shortness of breath starting lastnight  pt normally wears 2-4L of O2     Patient presents to the emergency department today via both advanced as well as basic life support emergency medical services  This is a resident of [facility] however unfortunately we did not get a call ahead to report his condition  Per EMS they were dispatched for respiratory difficulty arrived to find the patient with oxygen saturations the 70s on 6 L CPAP  He is placed on high-flow oxygen and ranged into the low 90 region  He initially reported to the EMS staff that he was short of breath  Here at bedside patient appears pleasantly confused  He is asking for his mother  He denies any chest pain or shortness of breath currently however states he had both of these yesterday which have resolved  Upon review of records this is a 25-year-old male who was seen by Cardiology about 1 month ago  History of mild aortic stenosis with paroxysmal atrial fibrillation as well as left bundle branch block  He has stress-induced cardiomyopathy as well as a history of prior aortic valve replacement  At that point he was refusing any further cardiac care  Review of a pulmonary note from an outside source a few months ago revealed a diagnosis of chronic hypoxic respiratory failure with recommendations to titrate oxygen to keep him around 88% or greater  It appears though he had an admission at [facility] in March of this year for COVID pneumonia  Prior to Admission Medications   Prescriptions Last Dose Informant Patient Reported? Taking?    Melatonin 3 MG CAPS   Yes No   Sig: Take 3 mg by mouth   POTASSIUM CHLORIDE PO  Self Yes No   Sig: Take 40 mEq by mouth 2 (two) times a day   Probiotic Product (BACID PO)   Yes No   Sig: Take by mouth   Patient not taking: Reported on 7/14/2021 acetaminophen (TYLENOL) 325 mg tablet   No No   Sig: Take 2 tablets (650 mg total) by mouth every 6 (six) hours as needed for mild pain   albuterol (ProAir HFA) 90 mcg/act inhaler   No No   Sig: Inhale 2 puffs every 6 (six) hours as needed for wheezing or shortness of breath   allopurinol (ZYLOPRIM) 100 mg tablet   Yes No   Sig: Take 100 mg by mouth daily   apixaban (Eliquis) 2 5 mg   Yes No   Sig: Take 2 5 mg by mouth 2 (two) times a day   aspirin (ECOTRIN LOW STRENGTH) 81 mg EC tablet   No No   Sig: Take 1 tablet (81 mg total) by mouth daily   atorvastatin (LIPITOR) 40 mg tablet   No No   Sig: Take 1 tablet (40 mg total) by mouth daily with dinner   b complex-vitamin C-folic acid (NEPHROCAPS) 1 mg capsule   No No   Sig: Take 1 capsule by mouth daily with dinner   Patient not taking: Reported on 7/14/2021   bisacodyl (DULCOLAX) 10 mg suppository   No No   Sig: Insert 1 suppository (10 mg total) into the rectum daily as needed for constipation   bumetanide (BUMEX) 1 mg tablet   Yes No   Sig: Take 2 mg by mouth daily    cholecalciferol (VITAMIN D3) 1,000 units tablet   Yes No   Sig: Take 2,000 Units by mouth daily   diltiazem (CARDIZEM CD) 180 mg 24 hr capsule   No No   Sig: Take 1 capsule (180 mg total) by mouth daily   enoxaparin (LOVENOX) 40 mg/0 4 mL   Yes No   Sig: Inject 40 mg under the skin daily   Patient not taking: Reported on 7/14/2021   hydrOXYzine HCL (ATARAX) 25 mg tablet   Yes No   Sig: Take 12 5 mg by mouth every 8 (eight) hours as needed for itching   Patient not taking: Reported on 6/30/2021   magnesium oxide (MAG-OX) 400 mg   No No   Sig: Take 1 tablet (400 mg total) by mouth daily   metoprolol tartrate (LOPRESSOR) 50 mg tablet   No No   Sig: Take 1 tablet (50 mg total) by mouth 3 (three) times a day   midodrine (PROAMATINE) 10 MG tablet   No No   Sig: Take 1 tablet (10 mg total) by mouth every 8 (eight) hours   Patient not taking: Reported on 7/14/2021   pantoprazole (PROTONIX) 40 mg tablet   No No   Sig: Take 1 tablet (40 mg total) by mouth 2 (two) times a day before meals   Patient taking differently: Take 40 mg by mouth 2 (two) times a day    sertraline (ZOLOFT) 100 mg tablet   Yes No   Sig: Take 100 mg by mouth daily   sertraline (ZOLOFT) 25 mg tablet   No No   Sig: Take 1 tablet (25 mg total) by mouth daily after dinner   traMADol (ULTRAM) 50 mg tablet  Outside Facility (Specify) Yes No   Sig: Take 50 mg by mouth every 6 (six) hours as needed for moderate pain   Patient not taking: Reported on 6/30/2021      Facility-Administered Medications: None       Past Medical History:   Diagnosis Date    Allergic rhinitis     last assessed 9/12/12    Finger fracture, right     Closed fx of the middle phalanx of the right 5th finger  last assessed 1/30/14    GI bleed 2/22/2019    Hemorrhoids     last assessed 2/10/14    Hyperlipidemia     Hypertension     Stroke [facility])        Past Surgical History:   Procedure Laterality Date    AORTIC VALVE REPLACEMENT  07/30/2014    AVR with 25mm [facility] Ease bovine pericardial valve    CARDIAC CATHETERIZATION  07/18/2014    SLB left main-normal, circumflex-normal, ramus intermedius-normal, RCA was large and dominant giving rise to the PDA & a large posterolateral branch  No disease  last assessed 8/19/14     COLONOSCOPY N/A 2/25/2019    Procedure: COLONOSCOPY;  Surgeon: Michael Grant DO;  [facility]: [facility]; Service: Gastroenterology    ESOPHAGOGASTRODUODENOSCOPY N/A 2/22/2019    Procedure: ESOPHAGOGASTRODUODENOSCOPY (EGD)-roadshow overnight;  Surgeon: Michael Grant DO;  [facility]; Service: Gastroenterology    ESOPHAGOGASTRODUODENOSCOPY N/A 2/23/2019    Procedure: ESOPHAGOGASTRODUODENOSCOPY (EGD); Surgeon: Tabitha Sandy MD;  [facility]; Service: Gastroenterology    ESOPHAGOGASTRODUODENOSCOPY N/A 2/25/2019    Procedure: ESOPHAGOGASTRODUODENOSCOPY (EGD); Surgeon: Michael Grant DO;  [facility];   Service: Gastroenterology    IR NON-TUNNELED CENTRAL LINE PLACEMENT  3/1/2019    IR NON-TUNNELED CENTRAL LINE PLACEMENT  2/4/2019    IR TUNNELED DIALYSIS CATHETER CHECK/CHANGE/REPOSITION/ANGIOPLASTY  4/18/2019    IR TUNNELED DIALYSIS CATHETER PLACEMENT  2/4/2019    IR TUNNELED DIALYSIS CATHETER PLACEMENT  2/18/2019    KNEE ARTHROSCOPY      KNEE CARTILAGE SURGERY Left     medial meniscus tear     TESTICLE SURGERY      TONSILLECTOMY AND ADENOIDECTOMY      TOOTH EXTRACTION         Family History   Problem Relation Age of Onset    Lung cancer Mother     Heart disease Mother         pacemaker placement     Coronary artery disease Father     Hypertension Father     Heart attack Father     Cancer Family         bladder      I have reviewed and agree with the history as documented  E-Cigarette/Vaping    E-Cigarette Use Never User      E-Cigarette/Vaping Substances    Nicotine No     THC No     CBD No     Flavoring No     Other No     Unknown No      Social History     Tobacco Use    Smoking status: Never Smoker    Smokeless tobacco: Never Used   Vaping Use    Vaping Use: Never used   Substance Use Topics    Alcohol use: Never     Comment: ocassion /never drank alcohol per [employer]     Drug use: No       Review of Systems   Constitutional: Negative for chills and fever  HENT: Negative for ear pain and sore throat  Eyes: Negative for pain and visual disturbance  Respiratory: Positive for shortness of breath  Negative for cough  Cardiovascular: Positive for leg swelling  Negative for chest pain and palpitations  Gastrointestinal: Negative for abdominal pain and vomiting  Genitourinary: Negative for dysuria and hematuria  Musculoskeletal: Negative for arthralgias and back pain  Skin: Negative for color change and rash  Neurological: Negative for seizures and syncope  Psychiatric/Behavioral: Positive for confusion  All other systems reviewed and are negative        Physical Exam  Physical Exam  Vitals and nursing note reviewed  Constitutional:       Appearance: He is well-developed  He is obese  He is not diaphoretic  Interventions: He is not intubated  HENT:      Head: Normocephalic and atraumatic  Mouth/Throat:      Comments: Dry mucous membranes  Eyes:      Extraocular Movements: Extraocular movements intact  Conjunctiva/sclera: Conjunctivae normal    Cardiovascular:      Rate and Rhythm: Normal rate and regular rhythm  No extrasystoles are present  Pulses: No decreased pulses  Heart sounds: Murmur heard  No friction rub  No gallop  Pulmonary:      Effort: Pulmonary effort is normal  Tachypnea present  No bradypnea or respiratory distress  He is not intubated  Breath sounds: Examination of the right-lower field reveals rales  Examination of the left-lower field reveals rales  Decreased breath sounds and rales present  Abdominal:      Palpations: Abdomen is soft  Tenderness: There is no abdominal tenderness  There is no rebound  Musculoskeletal:      Cervical back: Neck supple  Right lower leg: No tenderness  Edema present  Left lower leg: No tenderness  Edema present  Skin:     General: Skin is warm and dry  Findings: No rash  Neurological:      General: No focal deficit present  Mental Status: He is alert        Comments: Somewhat confused   Psychiatric:         Mood and Affect: Mood normal          Behavior: Behavior normal          Vital Signs  ED Triage Vitals   Temperature Pulse Respirations Blood Pressure SpO2   08/11/21 1052 08/11/21 1018 08/11/21 1018 08/11/21 1018 08/11/21 1018   98 °F (36 7 °C) 90 (!) 24 105/53 91 %      Temp Source Heart Rate Source Patient Position - Orthostatic VS BP Location FiO2 (%)   08/11/21 1052 08/11/21 1018 08/11/21 1018 08/11/21 1018 08/11/21 1036   Temporal Monitor Lying Right arm 50      Pain Score       --                  Vitals:    08/11/21 1130 08/11/21 1230 08/11/21 1300 08/11/21 1330   BP: 91/50 102/55 112/56 103/60   Pulse: 82 83 82 83   Patient Position - Orthostatic VS: Lying Lying Lying Lying         Visual Acuity      ED Medications  Medications   vancomycin (VANCOCIN) 2,250 mg in sodium chloride 0 9 % 500 mL IVPB (has no administration in time range)   cefepime (MAXIPIME) IVPB (premix in dextrose) 2,000 mg 50 mL (has no administration in time range)       Diagnostic Studies  Results Reviewed     Procedure Component Value Units Date/Time    Novel Coronavirus (Covid-19),PCR [facility] - 2 Hour Stat [[id number]]  (Normal) Collected: 08/11/21 1034    Lab Status: Final result Specimen: Nares from Nasopharyngeal Swab Updated: 08/11/21 1139     SARS-CoV-2 Negative    Narrative: The specimen collection materials, transport medium, and/or testing methodology utilized in the production of these test results have been proven to be reliable in a limited validation with an abbreviated program under the Emergency Utilization Authorization provided by the FDA  Testing reported as "Presumptive positive" will be confirmed with secondary testing to ensure result accuracy  Clinical caution and judgement should be used with the interpretation of these results with consideration of the clinical impression and other laboratory testing  Testing reported as "Positive" or "Negative" has been proven to be accurate according to standard laboratory validation requirements  All testing is performed with control materials showing appropriate reactivity at standard intervals        Blood gas, arterial [[id number]]  (Abnormal) Collected: 08/11/21 1111    Lab Status: Final result Specimen: Blood, Arterial from Brachial, Right Updated: 08/11/21 1122     pH, Arterial 7 347     pCO2, Arterial 79 8 mm Hg      pO2, Arterial 103 6 mm Hg      HCO3, Arterial 42 8 mmol/L      Base Excess, Arterial 14 5 mmol/L      O2 Content, Arterial 13 5 mL/dL      O2 HGB,Arterial  96 5 %      SOURCE Brachial, Right     MARLON TEST Yes     Non Vent type BIPAP BIPAP     IPAP 14     EPAP 8     BIPAP fio2 50 %     Magnesium [[id number]]  (Normal) Collected: 08/11/21 1034    Lab Status: Final result Specimen: Blood from Arm, Right Updated: 08/11/21 1113     Magnesium 2 6 mg/dL     NT-BNP PRO [[id number]]  (Abnormal) Collected: 08/11/21 1034    Lab Status: Final result Specimen: Blood from Arm, Right Updated: 08/11/21 1113     NT-proBNP 2,390 pg/mL     Comprehensive metabolic panel [[id number]]  (Abnormal) Collected: 08/11/21 1034    Lab Status: Final result Specimen: Blood from Arm, Right Updated: 08/11/21 1111     Sodium 144 mmol/L      Potassium 4 7 mmol/L      Chloride 102 mmol/L      CO2 43 mmol/L      ANION GAP -1 mmol/L      BUN 33 mg/dL      Creatinine 1 45 mg/dL      Glucose 97 mg/dL      Calcium 9 1 mg/dL      Corrected Calcium 9 6 mg/dL      AST 9 U/L      ALT 13 U/L      Alkaline Phosphatase 90 U/L      Total Protein 7 6 g/dL      Albumin 3 4 g/dL      Total Bilirubin 0 47 mg/dL      eGFR 51 ml/min/1 73sq m     Narrative:      [place] guidelines for Chronic Kidney Disease (CKD):     Stage 1 with normal or high GFR (GFR > 90 mL/min/1 73 square meters)    Stage 2 Mild CKD (GFR = 60-89 mL/min/1 73 square meters)    Stage 3A Moderate CKD (GFR = 45-59 mL/min/1 73 square meters)    Stage 3B Moderate CKD (GFR = 30-44 mL/min/1 73 square meters)    Stage 4 Severe CKD (GFR = 15-29 mL/min/1 73 square meters)    Stage 5 End Stage CKD (GFR <15 mL/min/1 73 square meters)  Note: GFR calculation is accurate only with a steady state creatinine    Lactic acid [[id number]]  (Normal) Collected: 08/11/21 1034    Lab Status: Final result Specimen: Blood from Arm, Right Updated: 08/11/21 1108     LACTIC ACID 1 1 mmol/L     Narrative:      Result may be elevated if tourniquet was used during collection      Troponin I [[id number]]  (Normal) Collected: 08/11/21 1034    Lab Status: Final result Specimen: Blood from Arm, Right Updated: 08/11/21 1108     Troponin I <0 02 ng/mL     CBC and differential [[id number]]  (Abnormal) Collected: 08/11/21 1034    Lab Status: Final result Specimen: Blood from Arm, Right Updated: 08/11/21 1107     WBC 7 40 Thousand/uL      RBC 4 11 Million/uL      Hemoglobin 9 1 g/dL      Hematocrit 35 2 %      MCV 86 fL      MCH 22 1 pg      MCHC 25 9 g/dL      RDW 19 7 %      MPV 11 9 fL      Platelets 408 Thousands/uL      nRBC 0 /100 WBCs      Neutrophils Relative 85 %      Immat GRANS % 1 %      Lymphocytes Relative 6 %      Monocytes Relative 7 %      Eosinophils Relative 1 %      Basophils Relative 0 %      Neutrophils Absolute 6 28 Thousands/µL      Immature Grans Absolute 0 04 Thousand/uL      Lymphocytes Absolute 0 43 Thousands/µL      Monocytes Absolute 0 54 Thousand/µL      Eosinophils Absolute 0 08 Thousand/µL      Basophils Absolute 0 03 Thousands/µL     Narrative: This is an appended report  These results have been appended to a previously verified report  Protime-INR [[id number]]  (Abnormal) Collected: 08/11/21 1034    Lab Status: Final result Specimen: Blood from Arm, Right Updated: 08/11/21 1102     Protime 14 9 seconds      INR 1 20    APTT [[id number]]  (Normal) Collected: 08/11/21 1034    Lab Status: Final result Specimen: Blood from Arm, Right Updated: 08/11/21 1102     PTT 26 seconds     D-Dimer [[id number]]  (Abnormal) Collected: 08/11/21 1034    Lab Status: Final result Specimen: Blood from Arm, Right Updated: 08/11/21 1102     D-Dimer, Quant 0 54 ug/ml FEU     Ammonia [[id number]]  (Normal) Collected: 08/11/21 1034    Lab Status: Final result Specimen: Blood from Arm, Right Updated: 08/11/21 1059     Ammonia 13 umol/L     UA (URINE) with reflex to Scope [[id number]]     Lab Status: No result Specimen: Urine                  CT chest without contrast   Final Result by Karis Thomas MD (08/11 1400)      1    Lungs are distorted by respiratory motion artifact but there is suggestion of focally increased groundglass opacity in the left upper lobe concerning for pneumonia  Recommend follow-up chest CT in 3 months to document resolution  2   Pulmonary vascular congestion  Mild pulmonary edema is difficult to exclude given central atelectatic opacities from expiratory phase of imaging  3   Persistent peripheral consolidation in the right lower lobe when compared to January, though with resolution of previously seen dense pneumonia/mucous plugging  Chronic consolidation may represent postinfectious pleuroparenchymal scarring, but also    warrants attention on follow-up CT, recommended above  4   Ascending thoracic aortic ectasia --unchanged since January  5   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  Patient is at increased risk for aspiration  Note: The study was marked in EPIC for significant notification  Imaging follow-up reminder notification was scheduled in the electronic medical record  Workstation performed: [id number]         XR chest 1 view portable   Final Result by Shabnam Bright MD (08/11 1211)      Right basilar and left midlung field consolidations concerning for multifocal pneumonia  The study was marked in EPIC for significant notification                    Workstation performed: [id number]                    Procedures  ECG 12 Lead Documentation Only    Date/Time: 8/11/2021 10:30 AM  Performed by: Enrique Johnson PA-C  Authorized by: Enrique Johnson PA-C     Indications / Diagnosis:  Shortness of breath  ECG reviewed by me, the ED Provider: yes    Patient location:  ED  Previous ECG:     Previous ECG:  Compared to current    Comparison ECG info:  No significant change from January 2021    Similarity:  No change    Comparison to cardiac monitor: Yes    Interpretation:     Interpretation: non-specific    Rate:     ECG rate:  89    ECG rate assessment: normal    Rhythm:     Rhythm: sinus rhythm    Ectopy: Ectopy: none    QRS:     QRS axis:  Normal  Conduction:     Conduction: abnormal      Abnormal conduction: complete LBBB    ST segments:     ST segments:  Normal  T waves:     T waves: normal      CriticalCare Time  Performed by: Long Dos Santos PA-C  Authorized by: Long Dos Santos PA-C     Critical care provider statement:     Critical care time (minutes):  35    Critical care was necessary to treat or prevent imminent or life-threatening deterioration of the following conditions:  Respiratory failure    Critical care was time spent personally by me on the following activities:  Blood draw for specimens, obtaining history from patient or surrogate, examination of patient, ordering and review of radiographic studies, ordering and review of laboratory studies, ordering and performing treatments and interventions, review of old charts, re-evaluation of patient's condition, evaluation of patient's response to treatment and development of treatment plan with patient or surrogate  Comments:      Patient is severely hypoxic infuse requiring BiPAP for elevated CO2 levels as well as pneumonia  ED Course  ED Course as of Aug 11 1404   Wed Aug 11, 2021   1038 Current BiPAP settings 14/8  Will obtain ABG after approximately 15-20 minutes of therapy      1106 WBC: 7 40   1106 Hemoglobin(!): 9 1   1106 Platelet Count: 901   1106 Age ruled out D-dimer   D-Dimer, Quant(!): 0 54   1106 INR(!): 1 20   1106 PTT: 26   1108 Troponin I: <0 02   1119 NT-proBNP(!): 2,390   1119 Sodium: 144   1119 Chloride: 102   1119 BUN(!): 33   1119 eGFR: 51   1125 pCO2, Arterial(!!): 79 8   1125 Patient on BiPAP        1147 SARS-COV-2: Negative   1147 Awaiting chest CT than likely admit      1155 Ammonia: 13   1257 IMPRESSION:     Right basilar and left midlung field consolidations concerning for multifocal pneumonia                    HEART Risk Score      Most Recent Value   Heart Score Risk Calculator   History  1 Filed at: 08/11/2021 1031   ECG  0 Filed at: 08/11/2021 1031   Age  1 Filed at: 08/11/2021 1031   Risk Factors  2 Filed at: 08/11/2021 1031   Troponin  0 Filed at: 08/11/2021 1031   HEART Score  4 Filed at: 08/11/2021 1031                      SBIRT 20yo+      Most Recent Value   SBIRT (23 yo +)   In order to provide better care to our patients, we are screening all of our patients for alcohol and drug use  Would it be okay to ask you these screening questions? Yes Filed at: 08/11/2021 1103   Initial Alcohol Screen: US AUDIT-C    1  How often do you have a drink containing alcohol?  0 Filed at: 08/11/2021 1103   2  How many drinks containing alcohol do you have on a typical day you are drinking? 0 Filed at: 08/11/2021 1103   3a  Male UNDER 65: How often do you have five or more drinks on one occasion? 0 Filed at: 08/11/2021 1103   Audit-C Score  0 Filed at: 08/11/2021 1103   ILIANA: How many times in the past year have you    Used an illegal drug or used a prescription medication for non-medical reasons? Never Filed at: 08/11/2021 1103                    MDM    Disposition  Final diagnoses:   Hypoxia   Confusion   Hypercarbia   Pneumonia     Time reflects when diagnosis was documented in both MDM as applicable and the Disposition within this note     Time User Action Codes Description Comment    8/11/2021 11:42 AM Garcia Flair D Add [R09 02] Hypoxia     8/11/2021 11:42 AM Garcia Flair D Add [R41 0] Confusion     8/11/2021 11:42 AM Garcia Flair D Add [R06 89] Hypercarbia     8/11/2021  2:04 PM Garcia Flair D Add [J18 9] Pneumonia       ED Disposition     ED Disposition Condition Date/Time Comment    Admit Stable Wed Aug 11, 2021  2:04 PM Case was discussed with Dr Martha Martinez and the patient's admission status was agreed to be Admission Status: inpatient status to the service of Dr Michael Bah           Follow-up Information    None         Patient's Medications   Discharge Prescriptions    No medications on file     No discharge procedures on file      PDMP Review     None          ED Provider  Electronically Signed by           Iván Augustin PA-C  08/11/21 5322

## 2021-08-11 NOTE — CONSULTS
Consultation - Pulmonary Medicine   Odilia Hines 57 y o  male MRN: [id number]  Unit/Bed#: NILES Encounter: [id number]      Physician Requesting Consult: Dr Ary Vera  Reason for Consult: Acute  Hypoxic and hypercarbic respiratory failure with pneumonia    Assessment/Plan:    Acute hypoxic and hypercarbic respiratory failure - likely multifactorial and related to underlying obesity hypoventilation syndrome ( is unclear whether not he was compliant with BiPAP therapy  Evidently when EMS arrived he was using CPAP set at 6 cm water )  Underlying pneumonia is also likely contributing to the acute respiratory failure  Additionally has underlying congestive heart failure  Pneumonia likely aspiration-   Previous barium swallow study was reviewed from 2019  Patient has right lower lobe infiltrates that are suggestive of recurrent aspirations  Obesity hypoventilation syndrome -   In the setting of morbid obesity  Congestive heart failure -  Presents with acute on chronic diastolic congestive Heart failure    History of COVID-19 pneumonia -  With previous prolonged hospitalization  Has been immobilized since that time  Acute encephalopathy likely due to underlying hypoxia and hypercapnia  Morbid obesity     Recommendations   Agree with current antibiotic regimen for empiric treatment of aspiration pneumonia: Cefepime and Flagyl  Also receiving vancomycin  Follow up on procalctonin, sputum culture, urine/legionella antigen  Would obtain blood cultures  Recommend MRSA swab  Deescalate antibiotic therapy depending on clinical improvement  Repeat ABG is pending  I anticipate that he will still be hypercarbic  At that point I would reinstitute BiPAP therapy with settings of 16/8 cm per water  Either way should use BiPAP 16/8 at night  Would continue NPO status for now  Once mental status is improved would restart diet with aspiration precautions      If hypercarbia and hypoxia have resolved  And encephalopathy continues will need evaluation for alternative causes     Diuresis per primary team for underlying heart failure  Continue other heart failure medications     Continue Eliquis for VTE prophylaxis     recommend repeat CT of the chest in 3 months to follow-up on infiltrates  Will need outpatient pulmonary follow-up     ______________________________________________________________________    HPI:    Raymond Kern is a 57 y o  male with multiple medical problems include morbid obesity, obesity hypoventilation syndrome, chronic hypoxic respiratory failure, atrial fibrillation,  Congestive heart failure  /Stress-induced cardiomyopathy, history of COVID-19 pneumonia with prolonged hospitalization from January 17th through March 4, 2021  He Required intubation and mechanical ventilation  And additionally was treated for  bacterial pneumonia and ESBL  congestive heart failure  Patient is prescribed BiPAP therapy but has been intolerant to it at times  Her most recent pulmonary documentation he is prescribed BiPAP with 16/8 cm of water  Patient uses 2 L of supplemental O2  At rest and 3 L supplemental O2 with exertion  He is immobile  Patient is [facility] a [facility]  Patient presented to the emergency room today  With respiratory difficulty  When EMS responded a was found to have oxygen saturations in the 70s on 6 L CPAP  He is placed on high-flow nasal cannula with improvement in saturations in the low 90s  workup in the ED included COVID PCR which was negative  ABG showed respiratory acidosis the pH 7 38 pCO2 of 79 8  Bicarb is elevated to 42 8, proBNP is elevated to 2390  When I went to assess the patient this afternoon he was lying in bed  He was pleasantly confused  He was only oriented to person  He was not able to supply me any medical history  I discussed this plan of care with his nurses and also the respiratory therapists    I also talked to the attending hospitalist       PFT results:   none on file  Review of Systems:   unable to obtain complete review of systems due to patient's encephalopathy  Historical Information   Past Medical History:   Diagnosis Date    Allergic rhinitis     last assessed 9/12/12    Finger fracture, right     Closed fx of the middle phalanx of the right 5th finger  last assessed 1/30/14    GI bleed 2/22/2019    Hemorrhoids     last assessed 2/10/14    Hyperlipidemia     Hypertension     Stroke [facility])      Past Surgical History:   Procedure Laterality Date    AORTIC VALVE REPLACEMENT  07/30/2014    AVR with 25mm [facility] Ease bovine pericardial valve    CARDIAC CATHETERIZATION  07/18/2014    SLB left main-normal, circumflex-normal, ramus intermedius-normal, RCA was large and dominant giving rise to the PDA & a large posterolateral branch  No disease  last assessed 8/19/14     COLONOSCOPY N/A 2/25/2019    Procedure: COLONOSCOPY;  Surgeon: Oriana Gill DO;  [facility]: [facility]; Service: Gastroenterology    ESOPHAGOGASTRODUODENOSCOPY N/A 2/22/2019    Procedure: ESOPHAGOGASTRODUODENOSCOPY (EGD)-roadshow overnight;  Surgeon: Oriana Gill DO;  [facility]; Service: Gastroenterology    ESOPHAGOGASTRODUODENOSCOPY N/A 2/23/2019    Procedure: ESOPHAGOGASTRODUODENOSCOPY (EGD); Surgeon: Sharlene Santiago MD;  [facility]; Service: Gastroenterology    ESOPHAGOGASTRODUODENOSCOPY N/A 2/25/2019    Procedure: ESOPHAGOGASTRODUODENOSCOPY (EGD); Surgeon: Oriana Gill DO;  [facility];   Service: Gastroenterology    IR NON-TUNNELED CENTRAL LINE PLACEMENT  3/1/2019    IR NON-TUNNELED CENTRAL LINE PLACEMENT  2/4/2019    IR TUNNELED DIALYSIS CATHETER CHECK/CHANGE/REPOSITION/ANGIOPLASTY  4/18/2019    IR TUNNELED DIALYSIS CATHETER PLACEMENT  2/4/2019    IR TUNNELED DIALYSIS CATHETER PLACEMENT  2/18/2019    KNEE ARTHROSCOPY      KNEE CARTILAGE SURGERY Left     medial meniscus tear     TESTICLE SURGERY      TONSILLECTOMY AND ADENOIDECTOMY      TOOTH EXTRACTION       Social History   Social History     Substance and Sexual Activity   Alcohol Use Never    Comment: ocassion /never drank alcohol per [employer]      Social History     Tobacco Use   Smoking Status Never Smoker   Smokeless Tobacco Never Used       Occupational history:      Family History:   Family History   Problem Relation Age of Onset    Lung cancer Mother     Heart disease Mother         pacemaker placement     Coronary artery disease Father     Hypertension Father     Heart attack Father     Cancer Family         bladder        Medications: The patient's active and prehospital medications were reviewed  Current Facility-Administered Medications   Medication Dose Route Frequency Provider Last Rate    bumetanide  1 mg Intravenous BID Elijah Johnson MD      cefepime  2,000 mg Intravenous Once Earma Ports, PA-C 2,000 mg (08/11/21 1407)    metroNIDAZOLE  500 mg Intravenous Q8H Elijah Johnson MD      vancomycin  2,000 mg Intravenous Once Earma Ports, PA-C           PhysicalExamination:  Vitals:   Vitals:    08/11/21 1230 08/11/21 1300 08/11/21 1330 08/11/21 1415   BP: 102/55 112/56 103/60 107/59   BP Location: Right arm Right arm Right arm Right arm   Pulse: 83 82 83 81   Resp: 18  18 21   Temp:       TempSrc:       SpO2: 100% 99% 97% 99%     There is no height or weight on file to calculate BMI  Temp  Min: 98 °F (36 7 °C)  Max: 98 °F (36 7 °C)     SpO2: 99 %,   SpO2 Activity: At Rest,   O2 Device: BiPAP    Gen:   Awake, did not appear in any significant distress  He was only oriented to person  He thought that he was at home [facility] nursing home  He believes the date was 25  He was unable to name the president  He kept mumbling about his father    Head: no masses, lesions  Eyes: anicteric, no conjunctival irritation  Oropharynx: no erythema, exudates, lesions   Lymph: no cervical, pre/post auricular, submental lymphadenopathy  Lungs:  Significantly diminished breath sounds bilaterally  No overt wheezing or rhonchi  Cardiac: rrr w  Harsh systolic murmur  Ab: soft, nontender  Ext: c/c/  Mild lower extremity edema nonpitting  Skin: warm, dry, no rashes, intact  Neuro: no focal neurological deficits       Diagnostic Data:  CBC:   Results from last 7 days   Lab Units 08/11/21  1034   WBC Thousand/uL 7 40   HEMOGLOBIN g/dL 9 1*   HEMATOCRIT % 35 2*   PLATELETS Thousands/uL 198       CMP:   Results from last 7 days   Lab Units 08/11/21  1034   SODIUM mmol/L 144   POTASSIUM mmol/L 4 7   CHLORIDE mmol/L 102   CO2 mmol/L 43*   BUN mg/dL 33*   CREATININE mg/dL 1 45*   CALCIUM mg/dL 9 1   ALK PHOS U/L 90   ALT U/L 13   AST U/L 9         Microbiology:        ABG:   Lab Results   Component Value Date    PHART 7 347 (L) 08/11/2021    ZLN2SGR 79 8 (HH) 08/11/2021    PO2ART 103 6 08/11/2021    ZSN6SUS 42 8 (H) 08/11/2021    BEART 14 5 08/11/2021    SOURCE Brachial, Right 08/11/2021       Imaging:      CT chest without contrast  August 11, 2021    IMPRESSION:     1   Lungs are distorted by respiratory motion artifact but there is suggestion of focally increased groundglass opacity in the left upper lobe concerning for pneumonia  Recommend follow-up chest CT in 3 months to document resolution      2   Pulmonary vascular congestion  Mild pulmonary edema is difficult to exclude given central atelectatic opacities from expiratory phase of imaging      3   Persistent peripheral consolidation in the right lower lobe when compared to January, though with resolution of previously seen dense pneumonia/mucous plugging  Chronic consolidation may represent postinfectious pleuroparenchymal scarring, but also   warrants attention on follow-up CT, recommended above      4  Ascending thoracic aortic ectasia --unchanged since January      5  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    Patient is at increased risk for aspiration        Cardiac lab/EKG/telemetry/ECHO:   Results from last 7 days   Lab Units 08/11/21  1034   TROPONIN I ng/mL <0 02   NT-PRO BNP pg/mL 2,390*      barium swallow evaluation April 2019       Assessment Summary; Pt presents with mild-moderate oropharyngeal dysphagia   characterized by reduced oral control with premature spillage,   decreased bolus breakdown with solid foods, mildly delayed   swallow initiation with mildly reduced hyolaryngeal elevation,   and penetration/intermittent trace aspiration of thin liquids       Recommendations:   1) Continue mechanical soft diet and nectar thick liquids as   primary diet   2) Continue working with speech therapy for use of safe swallow   strategies (I e  Rate control, brief bolus hold prior to swallow,   small bites/sips)   3) If available, consider VitalStim treatment for improved   timing/strength of swallow vs traditional sw tx for same   4) Begin trials of thin by tsp during speech therapy   5) Consider repeat VBS following additional swallow therapy         Shannan Ponce MD

## 2021-08-11 NOTE — PLAN OF CARE
Problem: Potential for Falls  Goal: Patient will remain free of falls  Description: INTERVENTIONS:  - Educate patient/family on patient safety including physical limitations  - Instruct patient to call for assistance with activity   - Consult OT/PT to assist with strengthening/mobility   - Keep Call bell within reach  - Keep bed low and locked with side rails adjusted as appropriate  - Keep care items and personal belongings within reach  - Initiate and maintain comfort rounds  - Make Fall Risk Sign visible to staff  - Apply yellow socks and bracelet for high fall risk patients  - Consider moving patient to room near nurses station  Outcome: Progressing     Problem: MOBILITY - ADULT  Goal: Maintain or return to baseline ADL function  Description: INTERVENTIONS:  -  Assess patient's ability to carry out ADLs; assess patient's baseline for ADL function and identify physical deficits which impact ability to perform ADLs (bathing, care of mouth/teeth, toileting, grooming, dressing, etc )  - Assess/evaluate cause of self-care deficits   - Assess range of motion  - Assess patient's mobility; develop plan if impaired  - Assess patient's need for assistive devices and provide as appropriate  - Encourage maximum independence but intervene and supervise when necessary  - Involve family in performance of ADLs  - Assess for home care needs following discharge   - Consider OT consult to assist with ADL evaluation and planning for discharge  - Provide patient education as appropriate  Outcome: Progressing  Goal: Maintains/Returns to pre admission functional level  Description: INTERVENTIONS:  - Perform BMAT or MOVE assessment daily    - Set and communicate daily mobility goal to care team and patient/family/caregiver     - Collaborate with rehabilitation services on mobility goals if consulted  - Out of bed for toileting  - Record patient progress and toleration of activity level   Outcome: Progressing     Problem: PAIN - ADULT  Goal: Verbalizes/displays adequate comfort level or baseline comfort level  Description: Interventions:  - Encourage patient to monitor pain and request assistance  - Assess pain using appropriate pain scale  - Administer analgesics based on type and severity of pain and evaluate response  - Implement non-pharmacological measures as appropriate and evaluate response  - Consider cultural and social influences on pain and pain management  - Notify physician/advanced practitioner if interventions unsuccessful or patient reports new pain  Outcome: Progressing     Problem: INFECTION - ADULT  Goal: Absence or prevention of progression during hospitalization  Description: INTERVENTIONS:  - Assess and monitor for signs and symptoms of infection  - Monitor lab/diagnostic results  - Monitor all insertion sites, i e  indwelling lines, tubes, and drains  - Administer medications as ordered  - Instruct and encourage patient and family to use good hand hygiene technique  - Identify and instruct in appropriate isolation precautions for identified infection/condition  Outcome: Progressing     Problem: SAFETY ADULT  Goal: Patient will remain free of falls  Description: INTERVENTIONS:  - Educate patient/family on patient safety including physical limitations  - Instruct patient to call for assistance with activity   - Consult OT/PT to assist with strengthening/mobility   - Keep Call bell within reach  - Keep bed low and locked with side rails adjusted as appropriate  - Keep care items and personal belongings within reach  - Initiate and maintain comfort rounds  - Make Fall Risk Sign visible to staff  - Apply yellow socks and bracelet for high fall risk patients  - Consider moving patient to room near nurses station  Outcome: Progressing  Goal: Maintain or return to baseline ADL function  Description: INTERVENTIONS:  -  Assess patient's ability to carry out ADLs; assess patient's baseline for ADL function and identify physical deficits which impact ability to perform ADLs (bathing, care of mouth/teeth, toileting, grooming, dressing, etc )  - Assess/evaluate cause of self-care deficits   - Assess range of motion  - Assess patient's mobility; develop plan if impaired  - Assess patient's need for assistive devices and provide as appropriate  - Encourage maximum independence but intervene and supervise when necessary  - Involve family in performance of ADLs  - Assess for home care needs following discharge   - Consider OT consult to assist with ADL evaluation and planning for discharge  - Provide patient education as appropriate  Outcome: Progressing  Goal: Maintains/Returns to pre admission functional level  Description: INTERVENTIONS:  - Perform BMAT or MOVE assessment daily    - Set and communicate daily mobility goal to care team and patient/family/caregiver  - Collaborate with rehabilitation services on mobility goals if consulted  - Perform Range of Motion 4 times a day  - Reposition patient every 2 hours    - Out of bed for toileting  - Record patient progress and toleration of activity level   Outcome: Progressing     Problem: DISCHARGE PLANNING  Goal: Discharge to home or other facility with appropriate resources  Description: INTERVENTIONS:  - Identify barriers to discharge w/patient and caregiver  - Arrange for needed discharge resources and transportation as appropriate  - Identify discharge learning needs (meds, wound care, etc )  - Refer to Case Management Department for coordinating discharge planning if the patient needs post-hospital services based on physician/advanced practitioner order or complex needs related to functional status, cognitive ability, or social support system  Outcome: Progressing     Problem: Knowledge Deficit  Goal: Patient/family/caregiver demonstrates understanding of disease process, treatment plan, medications, and discharge instructions  Description: Complete learning assessment and assess knowledge base    Interventions:  - Provide teaching at level of understanding  - Provide teaching via preferred learning methods  Outcome: Progressing     Problem: CARDIOVASCULAR - ADULT  Goal: Maintains optimal cardiac output and hemodynamic stability  Description: INTERVENTIONS:  - Monitor I/O, vital signs and rhythm  - Monitor for S/S and trends of decreased cardiac output  - Administer and titrate ordered vasoactive medications to optimize hemodynamic stability  - Assess quality of pulses, skin color and temperature  - Assess for signs of decreased coronary artery perfusion  - Instruct patient to report change in severity of symptoms  Outcome: Progressing  Goal: Absence of cardiac dysrhythmias or at baseline rhythm  Description: INTERVENTIONS:  - Continuous cardiac monitoring, vital signs, obtain 12 lead EKG if ordered  - Administer antiarrhythmic and heart rate control medications as ordered  - Monitor electrolytes and administer replacement therapy as ordered  Outcome: Progressing     Problem: RESPIRATORY - ADULT  Goal: Achieves optimal ventilation and oxygenation  Description: INTERVENTIONS:  - Assess for changes in respiratory status  - Assess for changes in mentation and behavior  - Position to facilitate oxygenation and minimize respiratory effort  - Oxygen administered by appropriate delivery if ordered  - Initiate smoking cessation education as indicated  - Encourage broncho-pulmonary hygiene including cough, deep breathe, Incentive Spirometry  - Assess the need for suctioning and aspirate as needed  - Assess and instruct to report SOB or any respiratory difficulty  - Respiratory Therapy support as indicated  Outcome: Progressing     Problem: SKIN/TISSUE INTEGRITY - ADULT  Goal: Skin Integrity remains intact(Skin Breakdown Prevention)  Description: Assess:  -Perform Lee assessment every 8 hrs  -Inspect skin when repositioning, toileting, and assisting with ADLS  -Assess under medical devices such as bipap every 8 hrs  -Assess extremities for adequate circulation and sensation     Bed Management:  -Have minimal linens on bed & keep smooth, unwrinkled  -Change linens as needed when moist or perspiring  -Avoid sitting or lying in one position for more than 2 hours while in bed  -Keep HOB at 30-45 degrees     Toileting:  -Offer bedside commode  -Assess for incontinence every 2 hrs    Activity:  -Encourage activity and walks on unit  -Encourage or provide ROM exercises   -Turn and reposition patient every 2 Hours  -Use appropriate equipment to lift or move patient in bed  -Instruct/ Assist with weight shifting every 2 hrs when out of bed in chair  -Consider limitation of chair time 4 hour intervals    Skin Care:  -Avoid use of baby powder, tape, friction and shearing, hot water or constrictive clothing  -Do not massage red bony areas    Outcome: Progressing  Goal: Pressure injury heals and does not worsen  Description: Interventions:  - Implement low air loss mattress or specialty surface (Criteria met)  - Apply silicone foam dressing  - Limit chair time to 4 hour intervals  - Use special pressure reducing interventions such as EHOB cushion when in chair  - Apply fecal or urinary incontinence containment device   - Perform passive or active ROM every 8 hrs  - Turn and reposition patient & offload bony prominences every 2 hours   - Utilize friction reducing device or surface for transfers   - Consider consults to  interdisciplinary teams such as wound care  - Consider nutrition services referral as needed  Outcome: Progressing     Problem: MUSCULOSKELETAL - ADULT  Goal: Maintain or return mobility to safest level of function  Description: INTERVENTIONS:  - Assess patient's ability to carry out ADLs; assess patient's baseline for ADL function and identify physical deficits which impact ability to perform ADLs (bathing, care of mouth/teeth, toileting, grooming, dressing, etc )  - Assess/evaluate cause of self-care deficits   - Assess range of motion  - Assess patient's mobility  - Assess patient's need for assistive devices and provide as appropriate  - Encourage maximum independence but intervene and supervise when necessary  - Involve family in performance of ADLs  - Assess for home care needs following discharge   - Consider OT consult to assist with ADL evaluation and planning for discharge  - Provide patient education as appropriate  Outcome: Progressing

## 2021-08-11 NOTE — ASSESSMENT & PLAN NOTE
Lab Results   Component Value Date    EGFR 51 08/11/2021    EGFR 66 03/04/2021    EGFR 58 03/03/2021    CREATININE 1 45 (H) 08/11/2021    CREATININE 1 19 03/04/2021    CREATININE 1 32 (H) 03/03/2021     Baseline creatinine 1 1-1 4  Monitor renal function closely with diuresis

## 2021-08-11 NOTE — ASSESSMENT & PLAN NOTE
Pulmonary input appreciated  Improving, patient saturating well on baseline utilization 2 L nasal cannula

## 2021-08-11 NOTE — RESPIRATORY THERAPY NOTE
RT Protocol Note  Miladis Richardson 57 y o  male MRN: [id number]  Unit/Bed#:  Encounter: [id number]    Assessment    Principal Problem:    Acute on chronic respiratory failure with hypoxia and hypercapnia (HCC)  Active Problems:    Metabolic encephalopathy    Dysphagia    Paroxysmal atrial fibrillation (HCC)    Chronic anemia    CKD (chronic kidney disease), stage III (HCC)    Acute on chronic diastolic (congestive) heart failure (HCC)    Multifocal pneumonia      Home Pulmonary Medications:  Albuterol PRN  Home Devices/Therapy: Home O2, BiPAP/CPAP (2L continuous, BiPAP 16/8)    Past Medical History:   Diagnosis Date    Allergic rhinitis     last assessed 9/12/12    Finger fracture, right     Closed fx of the middle phalanx of the right 5th finger  last assessed 1/30/14    GI bleed 2/22/2019    Hemorrhoids     last assessed 2/10/14    Hyperlipidemia     Hypertension     Stroke [facility])      Social History     Socioeconomic History    Marital status: /Civil Union     Spouse name: None    Number of children: None    Years of education: None    Highest education level: None   Occupational History    None   Tobacco Use    Smoking status: Never Smoker    Smokeless tobacco: Never Used   Vaping Use    Vaping Use: Never used   Substance and Sexual Activity    Alcohol use: Never     Comment: ocassion /never drank alcohol per [employer]     Drug use: No    Sexual activity: None   Other Topics Concern    None   Social History Narrative    None     Social Determinants of Health     Financial Resource Strain:     Difficulty of Paying Living Expenses:    Food Insecurity:     Worried About Running Out of Food in the Last Year:     Ran Out of Food in the Last Year:    Transportation Needs:     Lack of Transportation (Medical):      Lack of Transportation (Non-Medical):    Physical Activity:     Days of Exercise per Week:     Minutes of Exercise per Session:    Stress:     Feeling of Stress : Social Connections:     Frequency of Communication with Friends and Family:     Frequency of Social Gatherings with Friends and Family:     Attends Oriental orthodox Services:     Active Member of Clubs or Organizations:     Attends Club or Organization Meetings:     Marital Status:    Intimate Partner Violence:     Fear of Current or Ex-Partner:     Emotionally Abused:     Physically Abused:     Sexually Abused:        Subjective         Objective    Physical Exam:   Assessment Type: Assess only  General Appearance: Alert, Awake  Respiratory Pattern: Dyspnea with exertion  Chest Assessment: Chest expansion symmetrical  Bilateral Breath Sounds: Diminished    Vitals:  Blood pressure 103/56, pulse 79, temperature 98 °F (36 7 °C), temperature source Tympanic, resp  rate 21, height 5' 11" (1 803 m), weight (!) 157 kg (345 lb 10 9 oz), SpO2 100 %  Results from last 7 days   Lab Units 08/11/21  1111   PH ART  7 347*   PCO2 ART mm Hg 79 8*   PO2 ART mm Hg 103 6   HCO3 ART mmol/L 42 8*   BASE EXC ART mmol/L 14 5   O2 CONTENT ART mL/dL 13 5*   O2 HGB, ARTERIAL % 96 5   ABG SOURCE  Brachial, Right   MARLON TEST  Yes       Imaging and other studies: I have personally reviewed pertinent reports  Plan    Respiratory Plan: Home Bronchodilator Patient pathway, Vent/NIV/HFNC        Resp Comments: Patient transported to ICU on 6L NC  Patient decreased to 4L and ABG drawn  BiPAP reapplied with setting changes          Will order PRN Albuterol

## 2021-08-11 NOTE — PHYSICAL THERAPY NOTE
PHYSICAL THERAPY          Patient Name: Jenny Bolivar  GMWKM'B Date: 8/11/2021 08/11/21 1500   PT Last Visit   PT Visit Date 08/11/21   Note Type   Note type Screen       Order received and chart review performed  Pt known to be a resident of [address] [place] [address]  Pt is dependent/non-ambulatory at baseline and requires [facility] lift for all OOB mobility  No skilled PT needs identified at this time; will d/c current PT orders      Doron Franks, PT

## 2021-08-11 NOTE — PROGRESS NOTES
Vancomycin Assessment    Bayron Aelman is a 57 y o  male who is currently receiving vancomycin 2000mg one time followed by 1500mg Q12H for Pneumonia     Relevant clinical data and objective history reviewed:  Creatinine   Date Value Ref Range Status   08/11/2021 1 45 (H) 0 60 - 1 30 mg/dL Final     Comment:     Standardized to IDMS reference method   03/04/2021 1 19 0 60 - 1 30 mg/dL Final     Comment:     Standardized to IDMS reference method   03/03/2021 1 32 (H) 0 60 - 1 30 mg/dL Final     Comment:     Standardized to IDMS reference method   04/09/2015 0 67 0 60 - 1 30 mg/dL Final     Comment:     Standardized to IDMS reference method   11/15/2014 0 70 0 60 - 1 30 mg/dL Final     Comment:     Standardized to IDMS reference method   11/02/2014 0 86 0 60 - 1 30 mg/dL Final     Comment:     Standardized to IDMS reference method     Vancomycin Rm   Date Value Ref Range Status   02/07/2021 16 0 ug/mL Final     /56   Pulse 79   Temp 98 °F (36 7 °C) (Tympanic)   Resp 21   Ht 5' 11" (1 803 m)   Wt (!) 157 kg (345 lb 10 9 oz)   SpO2 100%   BMI 48 21 kg/m²   No intake/output data recorded  Lab Results   Component Value Date/Time    BUN 33 (H) 08/11/2021 10:34 AM    BUN 17 04/09/2015 11:41 AM    WBC 7 40 08/11/2021 10:34 AM    WBC 6 71 11/02/2014 05:56 AM    HGB 11 6 (L) 08/11/2021 03:15 PM    HGB 9 1 (L) 08/11/2021 10:34 AM    HGB 11 9 (L) 11/02/2014 05:56 AM    HCT 34 (L) 08/11/2021 03:15 PM    HCT 35 2 (L) 08/11/2021 10:34 AM    HCT 39 3 11/02/2014 05:56 AM    MCV 86 08/11/2021 10:34 AM    MCV 81 (L) 11/02/2014 05:56 AM     08/11/2021 10:34 AM     11/02/2014 05:56 AM     Temp Readings from Last 3 Encounters:   08/11/21 98 °F (36 7 °C) (Tympanic)   06/30/21 97 5 °F (36 4 °C) (Tympanic)   03/10/21 97 8 °F (36 6 °C)     Vancomycin Days of Therapy: 1    Assessment/Plan  The patient is currently on vancomycin utilizing scheduled dosing based on adjusted body weight (due to obesity)  Baseline risks associated with therapy include: pre-existing renal impairment and concomitant nephrotoxic medications  The patient is currently receiving 2000mg one time followed by 1500mg Q12H and is clinically appropriate and dose will be continued  Pharmacy will also follow closely for s/sx of nephrotoxicity, infusion reactions, and appropriateness of therapy  BMP and CBC will be ordered per protocol  Plan for trough as patient approaches steady state, prior to the 4th  dose at approximately 06:30 on 08/13/2021  Due to infection severity, will target a trough of 15-20 (appropriate for most indications)   Pharmacy will continue to follow the patients culture results and clinical progress daily      Jer Rosales, Pharmacist

## 2021-08-11 NOTE — ASSESSMENT & PLAN NOTE
Without fever or wbc count , it's unclear if findings are acute or sequale of covid infection this year  Possible aspiration vs  HCAP vs  Gram negative pna  Day 2 vanco/cefepime/Flagyl  Follow-up Pro-calcitonin and trend  Check urine legionella and strep-pneumo antigen  Pulmonary input appreciated

## 2021-08-11 NOTE — H&P
H&P Exam - Rosa M Kimble 57 y o  male MRN: [id number]    Unit/Bed#: RM04 Encounter: [id number]    Multifocal pneumonia  Assessment & Plan  Without fever or wbc count ,unclear if findings are acute or sequale of covid infection this year  Possible aspiration vs  HCAP vs  Gram negative pna  Will treat with vanco/cefepime/flagyl  Check Pro-calcitonin and trend  Check urine legionella and strep-pneumo antigen  Attempt to obtain sputum cultures   Pulmonary consult     Acute on chronic diastolic (congestive) heart failure (HCC)  Assessment & Plan  Wt Readings from Last 3 Encounters:   07/14/21 (!) 155 kg (342 lb 3 2 oz)   03/04/21 (!) 153 kg (338 lb 3 oz)   10/22/20 (!) 156 kg (344 lb)     Chronically on bumex 1mg BID , will transition to IV Bumex 1mg BID  I&O, daily weights, fluid restrict  Low salt diet when off bipap    Acute on chronic respiratory failure with hypoxia and hypercapnia (HCC)  Assessment & Plan  Chronically on 2 L NC due to history of covid and OHS  Admit to stepdown for continuous bipap  Repeat ABG  Consult pulm    CKD (chronic kidney disease), stage III [facility])  Assessment & Plan  Lab Results   Component Value Date    EGFR 51 08/11/2021    EGFR 66 03/04/2021    EGFR 58 03/03/2021    CREATININE 1 45 (H) 08/11/2021    CREATININE 1 19 03/04/2021    CREATININE 1 32 (H) 03/03/2021     Baseline creatinine 1 1-1 4  Monitor renal function closely with diuresis     Chronic anemia  Assessment & Plan  Baseline hemoglobin around 8-10     Paroxysmal atrial fibrillation (HCC)  Assessment & Plan  Will hold cardizem due to hypotension  Continue metoprolol   Eliquis for [facility]    Dysphagia  Assessment & Plan  History of dysphagia with CT concerning aspiration  Will obtain a speech eval    Metabolic encephalopathy  Assessment & Plan  Secondary to hypercapnia  CT head negative   Will monitor neurological status as hypercapnia is treated       History of Present Illness     History is obtained via review of medical records and discussion with the ED staff  Patient is a 71-year-old male with past medical history significant for atrial fibrillation, chronic respiratory failure hypoxia presents to the ED with hypoxia from [facility]  Unfortunately he is confused and unable to relate a history    Review of Systems   Unable to perform ROS: Mental status change       Historical Information   Past Medical History:   Diagnosis Date    Allergic rhinitis     last assessed 9/12/12    Finger fracture, right     Closed fx of the middle phalanx of the right 5th finger  last assessed 1/30/14    GI bleed 2/22/2019    Hemorrhoids     last assessed 2/10/14    Hyperlipidemia     Hypertension     Stroke [facility])      Past Surgical History:   Procedure Laterality Date    AORTIC VALVE REPLACEMENT  07/30/2014    AVR with 25mm [facility] Ease bovine pericardial valve    CARDIAC CATHETERIZATION  07/18/2014    SLB left main-normal, circumflex-normal, ramus intermedius-normal, RCA was large and dominant giving rise to the PDA & a large posterolateral branch  No disease  last assessed 8/19/14     COLONOSCOPY N/A 2/25/2019    Procedure: COLONOSCOPY;  Surgeon: Michael Grant DO;  [facility]: [facility]; Service: Gastroenterology    ESOPHAGOGASTRODUODENOSCOPY N/A 2/22/2019    Procedure: ESOPHAGOGASTRODUODENOSCOPY (EGD)-roadshow overnight;  Surgeon: Michael Grant DO;  [facility]; Service: Gastroenterology    ESOPHAGOGASTRODUODENOSCOPY N/A 2/23/2019    Procedure: ESOPHAGOGASTRODUODENOSCOPY (EGD); Surgeon: Tabitha Sandy MD;  [facility]; Service: Gastroenterology    ESOPHAGOGASTRODUODENOSCOPY N/A 2/25/2019    Procedure: ESOPHAGOGASTRODUODENOSCOPY (EGD); Surgeon: Michael Grant DO;  [facility];   Service: Gastroenterology    IR NON-TUNNELED CENTRAL LINE PLACEMENT  3/1/2019    IR NON-TUNNELED CENTRAL LINE PLACEMENT  2/4/2019    IR TUNNELED DIALYSIS CATHETER CHECK/CHANGE/REPOSITION/ANGIOPLASTY 4/18/2019    IR TUNNELED DIALYSIS CATHETER PLACEMENT  2/4/2019    IR TUNNELED DIALYSIS CATHETER PLACEMENT  2/18/2019    KNEE ARTHROSCOPY      KNEE CARTILAGE SURGERY Left     medial meniscus tear     TESTICLE SURGERY      TONSILLECTOMY AND ADENOIDECTOMY      TOOTH EXTRACTION       Social History   Social History     Substance and Sexual Activity   Alcohol Use Never    Comment: ocassion /never drank alcohol per [employer]      Social History     Substance and Sexual Activity   Drug Use No     Social History     Tobacco Use   Smoking Status Never Smoker   Smokeless Tobacco Never Used     E-Cigarette Use: Never User     E-Cigarette/Vaping Substances    Nicotine No     THC No     CBD No     Flavoring No     Other No     Unknown No        Family History: non-contributory    Meds/Allergies   all medications and allergies reviewed  Allergies   Allergen Reactions    Amiodarone      Developed Rash    Penicillins Rash     However, has subsequently tolerated Cefazolin and Cefepime       Objective   First Vitals:   Blood Pressure: 105/53 (08/11/21 1018)  Pulse: 90 (08/11/21 1018)  Temperature: 98 °F (36 7 °C) (08/11/21 1052)  Temp Source: Temporal (08/11/21 1052)  Respirations: (!) 24 (08/11/21 1018)  SpO2: 91 % (08/11/21 1018)    Current Vitals:   Blood Pressure: 103/60 (08/11/21 1330)  Pulse: 83 (08/11/21 1330)  Temperature: 98 °F (36 7 °C) (08/11/21 1052)  Temp Source: Temporal (08/11/21 1052)  Respirations: 18 (08/11/21 1330)  SpO2: 97 % (08/11/21 1330)    No intake or output data in the 24 hours ending 08/11/21 1424    Invasive Devices     Peripheral Intravenous Line            Peripheral IV 08/11/21 Left Antecubital <1 day          Drain            Urethral Catheter Straight-tip 16 Fr  206 days                Physical Exam  Vitals and nursing note reviewed  Constitutional:       General: He is not in acute distress  HENT:      Head: Normocephalic and atraumatic  Nose: Nose normal  No congestion  Mouth/Throat:      Pharynx: Oropharynx is clear  Cardiovascular:      Rate and Rhythm: Normal rate and regular rhythm  Pulmonary:      Effort: Pulmonary effort is normal  No respiratory distress  Breath sounds: Rales present  Abdominal:      General: Abdomen is flat  Bowel sounds are normal    Musculoskeletal:         General: No swelling  Normal range of motion  Cervical back: Normal range of motion  No rigidity  Skin:     General: Skin is warm  Capillary Refill: Capillary refill takes 2 to 3 seconds  Coloration: Skin is not jaundiced  Neurological:      General: No focal deficit present  Mental Status: He is disoriented  Lab Results:   Lab Results   Component Value Date    WBC 7 40 08/11/2021    HGB 11 6 (L) 08/11/2021    HCT 34 (L) 08/11/2021    MCV 86 08/11/2021     08/11/2021     Lab Results   Component Value Date     04/09/2015    SODIUM 144 08/11/2021    K 4 7 08/11/2021     08/11/2021    CO2 >45 (H) 08/11/2021    ANIONGAP 13 04/09/2015    AGAP -1 (L) 08/11/2021    BUN 33 (H) 08/11/2021    CREATININE 1 45 (H) 08/11/2021    GLUC 97 08/11/2021    GLUF 102 (H) 06/02/2018    CALCIUM 9 1 08/11/2021    AST 9 08/11/2021    ALT 13 08/11/2021    ALKPHOS 90 08/11/2021    PROT 7 7 10/30/2014    TP 7 6 08/11/2021    BILITOT 0 4 10/30/2014    TBILI 0 47 08/11/2021    EGFR 51 08/11/2021       Imaging:   CT chest without contrast   Final Result      1  Lungs are distorted by respiratory motion artifact but there is suggestion of focally increased groundglass opacity in the left upper lobe concerning for pneumonia  Recommend follow-up chest CT in 3 months to document resolution  2   Pulmonary vascular congestion  Mild pulmonary edema is difficult to exclude given central atelectatic opacities from expiratory phase of imaging        3   Persistent peripheral consolidation in the right lower lobe when compared to January, though with resolution of previously seen dense pneumonia/mucous plugging  Chronic consolidation may represent postinfectious pleuroparenchymal scarring, but also    warrants attention on follow-up CT, recommended above  4   Ascending thoracic aortic ectasia --unchanged since January 5   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  Patient is at increased risk for aspiration  Note: The study was marked in EPIC for significant notification  Imaging follow-up reminder notification was scheduled in the electronic medical record  Workstation performed: [id number]         XR chest 1 view portable   Final Result      Right basilar and left midlung field consolidations concerning for multifocal pneumonia  The study was marked in EPIC for significant notification                    Workstation performed: [id number]             EKG, Pathology, and Other Studies: NSR    Code Status: Prior  Advance Directive and Living Will:      Power of :    POLST:      Counseling / Coordination of Care:

## 2021-08-11 NOTE — RESPIRATORY THERAPY NOTE
08/11/21 153   Respiratory Assessment   Resp Comments Patient transported to ICU on 6L NC  Patient decreased to 4L and ABG drawn  BiPAP reapplied with setting changes        Non-Invasive Information   O2 Interface Device Full face mask   Non-Invasive Ventilation Mode BiPAP   SpO2 97 %   $ Pulse Oximetry Spot Check Charge Completed   Non-Invasive Settings   IPAP (cm) 16 cm   EPAP (cm) 8 cm   Rate (Set) 8   FiO2 (%) 40   Pressure Support (cm H2O) 8   Rise Time 3   Inspiratory Time (Set) 0 95   Non-Invasive Readings   Total Rate 19   MV (Mech) 7 8   Peak Pressure (Obs) 16   Spontaneous Vt (mL) 405   Leak (lpm) 0   Skin Intervention Skin intact   Non-Invasive Alarms   Insp Pressure High (cm H20) 24   Insp Pressure Low (cm H20) 4   Low Insp Pressure Time (sec) 20 sec   MV Low (L/min) 4   Vt High (mL) 1200   Vt Low (mL) 200   High Resp Rate (BPM) 40 BPM   Low Resp Rate (BPM) 6 BPM   Apnea Interval (sec) 20

## 2021-08-12 PROBLEM — N30.01 ACUTE CYSTITIS WITH HEMATURIA: Status: ACTIVE | Noted: 2021-08-12

## 2021-08-12 LAB
ANION GAP SERPL CALCULATED.3IONS-SCNC: 0 MMOL/L (ref 4–13)
BASOPHILS # BLD AUTO: 0.02 THOUSANDS/ΜL (ref 0–0.1)
BASOPHILS NFR BLD AUTO: 0 % (ref 0–1)
BUN SERPL-MCNC: 32 MG/DL (ref 5–25)
CALCIUM SERPL-MCNC: 8.8 MG/DL (ref 8.3–10.1)
CHLORIDE SERPL-SCNC: 103 MMOL/L (ref 100–108)
CO2 SERPL-SCNC: 40 MMOL/L (ref 21–32)
CREAT SERPL-MCNC: 1.34 MG/DL (ref 0.6–1.3)
EOSINOPHIL # BLD AUTO: 0.3 THOUSAND/ΜL (ref 0–0.61)
EOSINOPHIL NFR BLD AUTO: 5 % (ref 0–6)
ERYTHROCYTE [DISTWIDTH] IN BLOOD BY AUTOMATED COUNT: 20 % (ref 11.6–15.1)
GFR SERPL CREATININE-BSD FRML MDRD: 56 ML/MIN/1.73SQ M
GLUCOSE SERPL-MCNC: 96 MG/DL (ref 65–140)
HCT VFR BLD AUTO: 34.8 % (ref 36.5–49.3)
HGB BLD-MCNC: 9 G/DL (ref 12–17)
IMM GRANULOCYTES # BLD AUTO: 0.04 THOUSAND/UL (ref 0–0.2)
IMM GRANULOCYTES NFR BLD AUTO: 1 % (ref 0–2)
L PNEUMO1 AG UR QL IA.RAPID: NEGATIVE
LYMPHOCYTES # BLD AUTO: 0.19 THOUSANDS/ΜL (ref 0.6–4.47)
LYMPHOCYTES NFR BLD AUTO: 3 % (ref 14–44)
MCH RBC QN AUTO: 22 PG (ref 26.8–34.3)
MCHC RBC AUTO-ENTMCNC: 25.9 G/DL (ref 31.4–37.4)
MCV RBC AUTO: 85 FL (ref 82–98)
MONOCYTES # BLD AUTO: 0.37 THOUSAND/ΜL (ref 0.17–1.22)
MONOCYTES NFR BLD AUTO: 7 % (ref 4–12)
NEUTROPHILS # BLD AUTO: 4.71 THOUSANDS/ΜL (ref 1.85–7.62)
NEUTS SEG NFR BLD AUTO: 84 % (ref 43–75)
NRBC BLD AUTO-RTO: 0 /100 WBCS
PLATELET # BLD AUTO: 170 THOUSANDS/UL (ref 149–390)
PMV BLD AUTO: 11.4 FL (ref 8.9–12.7)
POTASSIUM SERPL-SCNC: 3.8 MMOL/L (ref 3.5–5.3)
PROCALCITONIN SERPL-MCNC: 0.07 NG/ML
RBC # BLD AUTO: 4.09 MILLION/UL (ref 3.88–5.62)
S PNEUM AG UR QL: NEGATIVE
SODIUM SERPL-SCNC: 143 MMOL/L (ref 136–145)
WBC # BLD AUTO: 5.63 THOUSAND/UL (ref 4.31–10.16)

## 2021-08-12 PROCEDURE — 99232 SBSQ HOSP IP/OBS MODERATE 35: CPT | Performed by: INTERNAL MEDICINE

## 2021-08-12 PROCEDURE — 85025 COMPLETE CBC W/AUTO DIFF WBC: CPT | Performed by: NURSE PRACTITIONER

## 2021-08-12 PROCEDURE — 94760 N-INVAS EAR/PLS OXIMETRY 1: CPT

## 2021-08-12 PROCEDURE — 80048 BASIC METABOLIC PNL TOTAL CA: CPT | Performed by: NURSE PRACTITIONER

## 2021-08-12 PROCEDURE — 94660 CPAP INITIATION&MGMT: CPT

## 2021-08-12 PROCEDURE — 84145 PROCALCITONIN (PCT): CPT | Performed by: FAMILY MEDICINE

## 2021-08-12 PROCEDURE — 94664 DEMO&/EVAL PT USE INHALER: CPT

## 2021-08-12 PROCEDURE — 99232 SBSQ HOSP IP/OBS MODERATE 35: CPT | Performed by: FAMILY MEDICINE

## 2021-08-12 PROCEDURE — 92610 EVALUATE SWALLOWING FUNCTION: CPT

## 2021-08-12 RX ORDER — ACETAMINOPHEN 325 MG/1
650 TABLET ORAL EVERY 6 HOURS PRN
Status: DISCONTINUED | OUTPATIENT
Start: 2021-08-12 | End: 2021-08-19 | Stop reason: HOSPADM

## 2021-08-12 RX ORDER — DIPHENHYDRAMINE HYDROCHLORIDE 50 MG/ML
12.5 INJECTION INTRAMUSCULAR; INTRAVENOUS ONCE
Status: COMPLETED | OUTPATIENT
Start: 2021-08-12 | End: 2021-08-12

## 2021-08-12 RX ORDER — DIPHENHYDRAMINE HCL 25 MG
25 TABLET ORAL EVERY 6 HOURS PRN
Status: DISCONTINUED | OUTPATIENT
Start: 2021-08-12 | End: 2021-08-19 | Stop reason: HOSPADM

## 2021-08-12 RX ORDER — OXYCODONE HYDROCHLORIDE 5 MG/1
5 TABLET ORAL EVERY 4 HOURS PRN
Status: DISCONTINUED | OUTPATIENT
Start: 2021-08-12 | End: 2021-08-19 | Stop reason: HOSPADM

## 2021-08-12 RX ADMIN — CEFEPIME HYDROCHLORIDE 2000 MG: 2 INJECTION, SOLUTION INTRAVENOUS at 02:11

## 2021-08-12 RX ADMIN — Medication 1 CAPSULE: at 15:38

## 2021-08-12 RX ADMIN — OXYCODONE HYDROCHLORIDE 5 MG: 5 TABLET ORAL at 18:52

## 2021-08-12 RX ADMIN — ATORVASTATIN CALCIUM 40 MG: 40 TABLET, FILM COATED ORAL at 15:39

## 2021-08-12 RX ADMIN — BUMETANIDE 1 MG: 0.25 INJECTION, SOLUTION INTRAMUSCULAR; INTRAVENOUS at 08:09

## 2021-08-12 RX ADMIN — DILTIAZEM HYDROCHLORIDE 180 MG: 180 CAPSULE, COATED, EXTENDED RELEASE ORAL at 08:08

## 2021-08-12 RX ADMIN — MAGNESIUM OXIDE TAB 400 MG (241.3 MG ELEMENTAL MG) 400 MG: 400 (241.3 MG) TAB at 08:07

## 2021-08-12 RX ADMIN — METRONIDAZOLE 500 MG: 500 INJECTION, SOLUTION INTRAVENOUS at 18:29

## 2021-08-12 RX ADMIN — CEFEPIME HYDROCHLORIDE 2000 MG: 2 INJECTION, SOLUTION INTRAVENOUS at 14:21

## 2021-08-12 RX ADMIN — DIPHENHYDRAMINE HYDROCHLORIDE 12.5 MG: 50 INJECTION, SOLUTION INTRAMUSCULAR; INTRAVENOUS at 20:45

## 2021-08-12 RX ADMIN — ALLOPURINOL 100 MG: 100 TABLET ORAL at 08:07

## 2021-08-12 RX ADMIN — PANTOPRAZOLE SODIUM 40 MG: 40 TABLET, DELAYED RELEASE ORAL at 15:39

## 2021-08-12 RX ADMIN — METOPROLOL TARTRATE 50 MG: 50 TABLET, FILM COATED ORAL at 08:08

## 2021-08-12 RX ADMIN — APIXABAN 2.5 MG: 2.5 TABLET, FILM COATED ORAL at 18:28

## 2021-08-12 RX ADMIN — METOPROLOL TARTRATE 50 MG: 50 TABLET, FILM COATED ORAL at 15:39

## 2021-08-12 RX ADMIN — METOPROLOL TARTRATE 50 MG: 50 TABLET, FILM COATED ORAL at 22:00

## 2021-08-12 RX ADMIN — SERTRALINE HYDROCHLORIDE 100 MG: 100 TABLET ORAL at 08:07

## 2021-08-12 RX ADMIN — VANCOMYCIN HYDROCHLORIDE 1500 MG: 1 INJECTION, POWDER, LYOPHILIZED, FOR SOLUTION INTRAVENOUS at 20:28

## 2021-08-12 RX ADMIN — METRONIDAZOLE 500 MG: 500 INJECTION, SOLUTION INTRAVENOUS at 01:33

## 2021-08-12 RX ADMIN — DIPHENHYDRAMINE HYDROCHLORIDE 12.5 MG: 50 INJECTION, SOLUTION INTRAMUSCULAR; INTRAVENOUS at 21:29

## 2021-08-12 RX ADMIN — ASPIRIN 81 MG: 81 TABLET, COATED ORAL at 08:07

## 2021-08-12 RX ADMIN — DIPHENHYDRAMINE HYDROCHLORIDE 25 MG: 25 TABLET ORAL at 20:07

## 2021-08-12 RX ADMIN — METRONIDAZOLE 500 MG: 500 INJECTION, SOLUTION INTRAVENOUS at 11:41

## 2021-08-12 RX ADMIN — APIXABAN 2.5 MG: 2.5 TABLET, FILM COATED ORAL at 08:07

## 2021-08-12 RX ADMIN — VANCOMYCIN HYDROCHLORIDE 1500 MG: 1 INJECTION, POWDER, LYOPHILIZED, FOR SOLUTION INTRAVENOUS at 06:14

## 2021-08-12 NOTE — CASE MANAGEMENT
Case Management Assessment    Patient name Edwin Vogel  Location / MRN [id number]  : 1960 Date 2021       Current Admission Date: 2021  Current Admission Diagnosis:  Acute on chronic respiratory failure with hypoxia and hypercapnia [facility])   Patient Active Problem List   Diagnosis    Aortic stenosis, mild    Essential hypertension    Hyperlipidemia    Left bundle branch block    Hypokalemia    Murmur    Osteoarthritis of both knees    Pericardial disease    Sciatica    Age-related cataract of right eye    Venous insufficiency    Bilateral leg edema    Stress-induced cardiomyopathy    History of aortic valve replacement with bioprosthetic valve    Persistent atrial fibrillation (HCC)    Leukocytosis    Metabolic encephalopathy    Skin rash    Coagulopathy (HCC)    Age-related nuclear cataract, bilateral    Open angle with borderline findings, low risk, bilateral    Presence of intraocular lens    Vitreous degeneration of both eyes    Left arm swelling    Dysphagia    Left internal jugular vein thrombus    Morbid obesity     Depression    Gastric ulcer    Paroxysmal atrial fibrillation (HCC)    COVID-19    Acute kidney injury superimposed on CKD (HCC)    Chronic anemia    Hypernatremia    Seizure (HCC)    History of stroke    CKD (chronic kidney disease), stage III ([address] )    H/O heart valve replacement with tissue graft    Lower extremity edema    Acute on chronic respiratory failure with hypoxia and hypercapnia (HCC)    Obesity hypoventilation syndrome (HCC)    Acute on chronic diastolic (congestive) heart failure (HCC)    Multifocal pneumonia    Acute cystitis with hematuria    Previous Admission - Discharge Date:3/4/21   LOS (days): 1  Geometric Mean LOS (GMLOS) (days):   Days to GMLOS: Previous Discharge Diagnosis:  There are no discharge diagnoses documented for the most recent discharge         Risk of Unplanned Readmission Score  Predictive Model Details          24 (Low)  Factor Value    Calculated 8/12/2021 12:03 22% Number of active Rx orders 37    Risk of Unplanned Readmission Model 8% ECG/EKG order present in last 6 months     7% Latest BUN high (32 mg/dL)     7% Encounter of ten days or longer in last year present     7% Diagnosis of electrolyte disorder present     6% Latest INR high (1 20)     6% Imaging order present in last 6 months     5% Latest hemoglobin low (9 0 g/dL)     5% Number of ED visits in last six months 1     4% Age 57     4% Number of hospitalizations in last year 1     4% Diagnosis of deficiency anemia present     4% Active anticoagulant Rx order present     4% Latest creatinine high (1 34 mg/dL)     3% Diagnosis of renal failure present     2% Charlson Comorbidity Index 2     2% Future appointment scheduled     1% Active ulcer medication Rx order present     1% Current length of stay 0 917 days         BUNDLE:      OBJECTIVE:  Pt is a 57y o  year old /Civil Union, white or  [1], male with Scientologist preference of [address] 5 admitted on 1/64/2905 10:17 AM  Pt is admitted to  at [address] [place] [address] [address] with complaints of Acute on chronic respiratory failure with hypoxia and hypercapnia ([facility] [address] )   Current admission status: Inpatient       Preferred Pharmacy:   [id number] [address] [place] [address]  Phone: [phone] Fax: [phone]    Primary Care Provider: Niko Millard DO    Primary Insurance: [address] [place] [address]  Secondary Insurance:     ASSESSMENT:  Active Health Care Agents    There are no active Health Care Agents on file  Ryne Bagley 29 of Residence: [facility]    Readmission Root Cause  30 Day Readmission: No    Patient Information  Mental Status: Alert  During Assessment patient was accompanied by: Sister  Primary Caregiver:  Other (Comment) (Aides at [facility] home)  Type of Current Residence: Facility (Pt is a resident at [place] [address])  Living Arrangements: Other (Comment)    Activities of Daily Living Prior to Admission  Functional Status: Total dependent  Completes ADLs independently?: No  Level of ADL dependence: Total Dependent  Ambulates independently?: No  Level of ambulatory dependence: Total Dependent  Does patient use assisted devices?: Yes  Assisted Devices (DME) used: Wheelchair (Pt is elo lifted  )  Does patient currently own DME?: No  Does patient have a history of Outpatient Therapy (PT/OT)?: No  Does patient currently have [facility]?: No         Patient Information Continued  Does patient have prescription coverage?: Yes  Does patient receive dialysis treatments?: No  Does patient have a history of substance abuse?: No  Does patient have a history of Mental Health Diagnosis?: No    Means of Transportation  Means of Transport to Appts[de-identified]  (Pt requires ambulette transport)  In the past 12 months, has lack of transportation kept you from medical appointments or from getting medications?: No  In the past 12 months, has lack of transportation kept you from meetings, work, or from getting things needed for daily living?: No     Pt is a long term resident at [address] [place] [address]   Pt is a 15 day MA bedhold   Pt is bed bound and needs to be Dewane Javier lifted OOB  Pt needs Max assist with ADL's  I will continue to follow for any Case Management needs

## 2021-08-12 NOTE — UTILIZATION REVIEW
Inpatient Admission Authorization Request   NOTIFICATION OF INPATIENT ADMISSION/INPATIENT AUTHORIZATION REQUEST   SERVICING FACILITY:   [address] [place] [address]  [address]  Tax ID:  [id number]  NPI: [id number]  Place of Service: [place] [address]  Place of Service Code: 24     ATTENDING PROVIDER:  Attending Name and NPI#: Earlineine Burkitt, [place] [[id number]]  Address: [address]  Phone: [phone]     UTILIZATION REVIEW CONTACT:  Brittany Randall, Utilization   Network Utilization Review Department  Phone: [phone]  Fax [phone]  Email: [email or web address]     PHYSICIAN ADVISORY SERVICES:  FOR [id number] REVIEW - MEDICAL NECESSITY DENIAL  Phone: [phone]  Fax: [phone]  Email: [email or web address]     TYPE OF REQUEST:  Inpatient Status     ADMISSION INFORMATION:  ADMISSION DATE/TIME: 8/11/21  2:04 PM  PATIENT DIAGNOSIS CODE/DESCRIPTION:  Confusion [R41 0]  Hypercarbia [R06 89]  Pneumonia [J18 9]  SOB (shortness of breath) [R06 02]  Hypoxia [R09 02]  Multifocal pneumonia [J18 9]  DISCHARGE DATE/TIME: No discharge date for patient encounter  DISCHARGE DISPOSITION (IF DISCHARGED): Non [facility] SNF/TCU/SNU     IMPORTANT INFORMATION:  Please contact the Brittany Randall directly with any questions or concerns regarding this request  Department voicemails are confidential     Send requests for admission clinical reviews, concurrent reviews, approvals, and administrative denials due to lack of clinical to fax [phone]

## 2021-08-12 NOTE — RESPIRATORY THERAPY NOTE
08/11/21 2033   Respiratory Assessment   Assessment Type Assess only   General Appearance Sleeping   Respiratory Pattern Assisted   Chest Assessment Chest expansion symmetrical;Trachea midline   Resp Comments Patient received in his room, he is sleeping in no respiratory distress on the bipap, I have decreased the oxygen down from 40 to 30%   Nurse was made aware of   Non-Invasive Information   O2 Interface Device Full face mask   Non-Invasive Ventilation Mode BiPAP   SpO2 98 %   $ Pulse Oximetry Spot Check Charge Completed   Non-Invasive Settings   IPAP (cm) 16 cm   EPAP (cm) 8 cm   Rate (Set) 8   FiO2 (%) 30   Pressure Support (cm H2O) 8   Rise Time 3   Inspiratory Time (Set) 0 95   Non-Invasive Readings   Total Rate 19   Vt (mL) (Mech) 540   MV (Mech) 11   Peak Pressure (Obs) 16   Skin Intervention Skin intact   Non-Invasive Alarms   Insp Pressure High (cm H20) 24   Insp Pressure Low (cm H20) 4   Low Insp Pressure Time (sec) 20 sec   MV Low (L/min) 4   Vt High (mL) 1200   Vt Low (mL) 200   High Resp Rate (BPM) 40 BPM   Low Resp Rate (BPM) 6 BPM   Apnea Interval (sec) 20   Apnea Volume (mL) 10

## 2021-08-12 NOTE — PROGRESS NOTES
Progress Note - Pulmonary   Job Enoch 57 y o  male MRN: [id number]  Unit/Bed#:  Encounter: [id number]    Assessment/Plan:    1  Acute hypoxic and hypercapnic respiratory failure   1  Currently on 3 L nasal cannula  Oxygen requirement at baseline 2 L nasal cannula  2  Titrate oxygen to maintain SpO2 greater than or equal to 88%   3  Pulmonary toilet: Increase activity as tolerated, out of bed as tolerated, cough and deep breathing as encouraged, incentive spirometry q 1 hour  4  Continue BiPAP 16/8 during all hours of sleep  2  Pneumonia   1  Imaging shows right lower lobe infiltrate suggestive of recurrent aspirations  2   continue cefepime, Flagyl, vancomycin  3  Trend WBC, procalcitonin, temps  3  OHS/ANGELINA in the setting of morbid obesity   1  Recommend BiPAP while inpatient  2  Resume home BIPAP at nursing home  3  Will need compliance report to review at next follow up to determine whether or not we need to make changes  4  Acute cystitis with hematuria   1  May be contributing to encephalopathy   2  Treatment per primary team   5  Metabolic encephalopathy   1  Secondary to hypercapnia vs UTI   2  CT head negative  3  Continue to monitor- improving  6  History of COVID-19 pneumonia underlying acute encephalopathy  1  With previous prolonged hospitalization   2  No need for COVID directed therapies at this time      Chief Complaint:    "I feel okay "    Subjective:    Patient was seen examined today  No overnight events reported  Patient slept with BiPAP without incident  Patient reports that he feels better today  He does have some dyspnea with minimal exertion  Offers no other acute complaints  No chest pain or palpitations  No fevers or chills  Objective:    Vitals: Blood pressure 101/55, pulse 79, temperature (!) 97 2 °F (36 2 °C), temperature source Tympanic, resp  rate (!) 41, height 5' 11" (1 803 m), weight (!) 156 kg (344 lb 2 2 oz), SpO2 96 %   3L NC,Body mass index is 48 kg/m²  Intake/Output Summary (Last 24 hours) at 8/12/2021 1402  Last data filed at 8/12/2021 1333  Gross per 24 hour   Intake 820 ml   Output 1015 ml   Net -195 ml       Invasive Devices     Peripheral Intravenous Line            Peripheral IV 08/11/21 Left Antecubital 1 day                Physical Exam:     Physical Exam  Vitals and nursing note reviewed  Constitutional:       General: He is not in acute distress  Appearance: He is obese  HENT:      Head: Normocephalic and atraumatic  Right Ear: External ear normal       Left Ear: External ear normal       Nose: Nose normal       Mouth/Throat:      Mouth: Mucous membranes are moist       Pharynx: Oropharynx is clear  Eyes:      Extraocular Movements: Extraocular movements intact  Conjunctiva/sclera: Conjunctivae normal       Pupils: Pupils are equal, round, and reactive to light  Cardiovascular:      Rate and Rhythm: Normal rate and regular rhythm  Pulses: Normal pulses  Heart sounds: No murmur heard  Pulmonary:      Effort: Pulmonary effort is normal  No respiratory distress  Breath sounds: No wheezing, rhonchi or rales  Abdominal:      General: Bowel sounds are normal       Palpations: Abdomen is soft  There is no mass  Tenderness: There is no abdominal tenderness  Hernia: No hernia is present  Musculoskeletal:         General: No tenderness or deformity  Normal range of motion  Cervical back: Normal range of motion and neck supple  No muscular tenderness  Right lower leg: No edema  Left lower leg: No edema  Skin:     General: Skin is warm and dry  Neurological:      General: No focal deficit present  Mental Status: He is alert and oriented to person, place, and time  Mental status is at baseline  Psychiatric:         Mood and Affect: Mood normal          Behavior: Behavior normal          Thought Content:  Thought content normal          Judgment: Judgment normal          Labs: I have personally reviewed pertinent lab results  , ABG: No results found for: PHART, WSJ1YIN, PO2ART, OQV5MBG, Y6JRREWW, BEART, SOURCE, BNP: No results found for: BNP, CBC:   Lab Results   Component Value Date    WBC 5 63 08/12/2021    HGB 9 0 (L) 08/12/2021    HCT 34 8 (L) 08/12/2021    MCV 85 08/12/2021     08/12/2021    MCH 22 0 (L) 08/12/2021    MCHC 25 9 (L) 08/12/2021    RDW 20 0 (H) 08/12/2021    MPV 11 4 08/12/2021    NRBC 0 08/12/2021   , CMP:   Lab Results   Component Value Date    SODIUM 143 08/12/2021    K 3 8 08/12/2021     08/12/2021    CO2 40 (H) 08/12/2021    BUN 32 (H) 08/12/2021    CREATININE 1 34 (H) 08/12/2021    CALCIUM 8 8 08/12/2021    EGFR 56 08/12/2021   , PT/INR: No results found for: PT, INR, Troponin:   Lab Results   Component Value Date    TROPONINI <0 02 08/11/2021       Imaging and other studies: I have personally reviewed pertinent films in PACS

## 2021-08-12 NOTE — PROGRESS NOTES
Vancomycin IV Pharmacy-to-Dose Consultation    Brody Kendall is a 57 y o  male who is currently receiving Vancomycin IV with management by the Pharmacy Consult service  Assessment/Plan:  The patient was reviewed  Renal function is stable and no signs or symptoms of nephrotoxicity and/or infusion reactions were documented in the chart  Based on todays assessment, continue current vancomycin (day # 2) dosing of 1500mg q12h, with a plan for trough to be drawn at 0630 on 8/13/21  We will continue to follow the patients culture results and clinical progress daily      Vane Chaidez, Pharmacist

## 2021-08-12 NOTE — PLAN OF CARE
Problem: Potential for Falls  Goal: Patient will remain free of falls  Description: INTERVENTIONS:  - Educate patient/family on patient safety including physical limitations  - Instruct patient to call for assistance with activity   - Consult OT/PT to assist with strengthening/mobility   - Keep Call bell within reach  - Keep bed low and locked with side rails adjusted as appropriate  - Keep care items and personal belongings within reach  - Initiate and maintain comfort rounds  - Make Fall Risk Sign visible to staff  - Offer Toileting every 2 Hours, in advance of need  - Initiate/Maintain bed alarm  - Apply yellow socks and bracelet for high fall risk patients  - Consider moving patient to room near nurses station  Outcome: Progressing     Problem: MOBILITY - ADULT  Goal: Maintain or return to baseline ADL function  Description: INTERVENTIONS:  -  Assess patient's ability to carry out ADLs; assess patient's baseline for ADL function and identify physical deficits which impact ability to perform ADLs (bathing, care of mouth/teeth, toileting, grooming, dressing, etc )  - Assess/evaluate cause of self-care deficits   - Assess range of motion  - Assess patient's mobility; develop plan if impaired  - Assess patient's need for assistive devices and provide as appropriate  - Encourage maximum independence but intervene and supervise when necessary  - Involve family in performance of ADLs  - Assess for home care needs following discharge   - Consider OT consult to assist with ADL evaluation and planning for discharge  - Provide patient education as appropriate  Outcome: Progressing  Goal: Maintains/Returns to pre admission functional level  Description: INTERVENTIONS:  - Perform BMAT or MOVE assessment daily    - Set and communicate daily mobility goal to care team and patient/family/caregiver  - Collaborate with rehabilitation services on mobility goals if consulted  - Perform Range of Motion 4 times a day    - Reposition patient every 2 hours    - Record patient progress and toleration of activity level   Outcome: Progressing     Problem: PAIN - ADULT  Goal: Verbalizes/displays adequate comfort level or baseline comfort level  Description: Interventions:  - Encourage patient to monitor pain and request assistance  - Assess pain using appropriate pain scale  - Administer analgesics based on type and severity of pain and evaluate response  - Implement non-pharmacological measures as appropriate and evaluate response  - Consider cultural and social influences on pain and pain management  - Notify physician/advanced practitioner if interventions unsuccessful or patient reports new pain  Outcome: Progressing     Problem: INFECTION - ADULT  Goal: Absence or prevention of progression during hospitalization  Description: INTERVENTIONS:  - Assess and monitor for signs and symptoms of infection  - Monitor lab/diagnostic results  - Monitor all insertion sites, i e  indwelling lines, tubes, and drains  - Administer medications as ordered  - Instruct and encourage patient and family to use good hand hygiene technique  - Identify and instruct in appropriate isolation precautions for identified infection/condition  Outcome: Progressing     Problem: SAFETY ADULT  Goal: Patient will remain free of falls  Description: INTERVENTIONS:  - Educate patient/family on patient safety including physical limitations  - Instruct patient to call for assistance with activity   - Consult OT/PT to assist with strengthening/mobility   - Keep Call bell within reach  - Keep bed low and locked with side rails adjusted as appropriate  - Keep care items and personal belongings within reach  - Initiate and maintain comfort rounds  - Make Fall Risk Sign visible to staff  - Offer Toileting every 2 Hours, in advance of need  - Initiate/Maintain bed alarm  - Apply yellow socks and bracelet for high fall risk patients  - Consider moving patient to room near nurses station  Outcome: Progressing  Goal: Maintain or return to baseline ADL function  Description: INTERVENTIONS:  -  Assess patient's ability to carry out ADLs; assess patient's baseline for ADL function and identify physical deficits which impact ability to perform ADLs (bathing, care of mouth/teeth, toileting, grooming, dressing, etc )  - Assess/evaluate cause of self-care deficits   - Assess range of motion  - Assess patient's mobility; develop plan if impaired  - Assess patient's need for assistive devices and provide as appropriate  - Encourage maximum independence but intervene and supervise when necessary  - Involve family in performance of ADLs  - Assess for home care needs following discharge   - Consider OT consult to assist with ADL evaluation and planning for discharge  - Provide patient education as appropriate  Outcome: Progressing  Goal: Maintains/Returns to pre admission functional level  Description: INTERVENTIONS:  - Perform BMAT or MOVE assessment daily    - Set and communicate daily mobility goal to care team and patient/family/caregiver  - Collaborate with rehabilitation services on mobility goals if consulted  - Perform Range of Motion 4 times a day  - Reposition patient every 2 hours    - Record patient progress and toleration of activity level   Outcome: Progressing     Problem: DISCHARGE PLANNING  Goal: Discharge to home or other facility with appropriate resources  Description: INTERVENTIONS:  - Identify barriers to discharge w/patient and caregiver  - Arrange for needed discharge resources and transportation as appropriate  - Identify discharge learning needs (meds, wound care, etc )  - Refer to Case Management Department for coordinating discharge planning if the patient needs post-hospital services based on physician/advanced practitioner order or complex needs related to functional status, cognitive ability, or social support system  Outcome: Progressing     Problem: Knowledge Deficit  Goal: Patient/family/caregiver demonstrates understanding of disease process, treatment plan, medications, and discharge instructions  Description: Complete learning assessment and assess knowledge base    Interventions:  - Provide teaching at level of understanding  - Provide teaching via preferred learning methods  Outcome: Progressing     Problem: CARDIOVASCULAR - ADULT  Goal: Maintains optimal cardiac output and hemodynamic stability  Description: INTERVENTIONS:  - Monitor I/O, vital signs and rhythm  - Monitor for S/S and trends of decreased cardiac output  - Administer and titrate ordered vasoactive medications to optimize hemodynamic stability  - Assess quality of pulses, skin color and temperature  - Assess for signs of decreased coronary artery perfusion  - Instruct patient to report change in severity of symptoms  Outcome: Progressing  Goal: Absence of cardiac dysrhythmias or at baseline rhythm  Description: INTERVENTIONS:  - Continuous cardiac monitoring, vital signs, obtain 12 lead EKG if ordered  - Administer antiarrhythmic and heart rate control medications as ordered  - Monitor electrolytes and administer replacement therapy as ordered  Outcome: Progressing     Problem: RESPIRATORY - ADULT  Goal: Achieves optimal ventilation and oxygenation  Description: INTERVENTIONS:  - Assess for changes in respiratory status  - Assess for changes in mentation and behavior  - Position to facilitate oxygenation and minimize respiratory effort  - Oxygen administered by appropriate delivery if ordered  - Initiate smoking cessation education as indicated  - Encourage broncho-pulmonary hygiene including cough, deep breathe, Incentive Spirometry  - Assess the need for suctioning and aspirate as needed  - Assess and instruct to report SOB or any respiratory difficulty  - Respiratory Therapy support as indicated  Outcome: Progressing  Note: Requiring continuous bipap at this time     Problem: SKIN/TISSUE INTEGRITY - ADULT  Goal: Skin Integrity remains intact(Skin Breakdown Prevention)  Description: Assess:  -Perform Lee assessment every shift  -Clean and moisturize skin every shift  -Inspect skin when repositioning, toileting, and assisting with ADLS  -Assess extremities for adequate circulation and sensation     Bed Management:  -Have minimal linens on bed & keep smooth, unwrinkled  -Change linens as needed when moist or perspiring  -Avoid sitting or lying in one position for more than 2 hours while in bed  -Keep HOB at 30 degrees     Toileting:  -Offer bedside commode  -Assess for incontinence   -Use incontinent care products after each incontinent episode such as hyrdoguard    Activity:  -Mobilize patient as medically able  -Encourage activity and walks on unit  -Encourage or provide ROM exercises   -Turn and reposition patient every 2 Hours  -Use appropriate equipment to lift or move patient in bed    Skin Care:  -Avoid use of baby powder, tape, friction and shearing, hot water or constrictive clothing  -Relieve pressure over bony prominences using Alleyvn foam pads  -Do not massage red bony areas    Goal: Pressure injury heals and does not worsen  Description: Interventions:  - Implement low air loss mattress or specialty surface (Criteria met)  - Apply silicone foam dressing  -- Apply fecal or urinary incontinence containment device   - Perform passive or active ROM every shift  - Turn and reposition patient & offload bony prominences every 2 hours   - Utilize friction reducing device or surface for transfers   -- Use incontinent care products after each incontinent episode such as Hydroguard  - Consider nutrition services referral as needed  Outcome: Progressing     Problem: MUSCULOSKELETAL - ADULT  Goal: Maintain or return mobility to safest level of function  Description: INTERVENTIONS:  - Assess patient's ability to carry out ADLs; assess patient's baseline for ADL function and identify physical deficits which impact ability to perform ADLs (bathing, care of mouth/teeth, toileting, grooming, dressing, etc )  - Assess/evaluate cause of self-care deficits   - Assess range of motion  - Assess patient's mobility  - Assess patient's need for assistive devices and provide as appropriate  - Encourage maximum independence but intervene and supervise when necessary  - Involve family in performance of ADLs  - Assess for home care needs following discharge   - Consider OT consult to assist with ADL evaluation and planning for discharge  - Provide patient education as appropriate  Outcome: Progressing     Problem: Prexisting or High Potential for Compromised Skin Integrity  Goal: Skin integrity is maintained or improved  Description: INTERVENTIONS:  - Identify patients at risk for skin breakdown  - Assess and monitor skin integrity  - Assess and monitor nutrition and hydration status  - Monitor labs   - Assess for incontinence   - Turn and reposition patient  - Assist with mobility/ambulation  - Relieve pressure over bony prominences  - Avoid friction and shearing  - Provide appropriate hygiene as needed including keeping skin clean and dry  - Evaluate need for skin moisturizer/barrier cream  - Collaborate with interdisciplinary team   - Patient/family teaching  - Consider wound care consult   Outcome: Progressing

## 2021-08-12 NOTE — SPEECH THERAPY NOTE
Speech Language/Pathology  Speech/Language Pathology  Assessment    Patient Name: Lucille BAUTISTA Date: 8/12/2021     Problem List  Principal Problem:    Acute on chronic respiratory failure with hypoxia and hypercapnia (HCC)  Active Problems:    Metabolic encephalopathy    Dysphagia    Paroxysmal atrial fibrillation (HCC)    Chronic anemia    CKD (chronic kidney disease), stage III (HCC)    Acute on chronic diastolic (congestive) heart failure (HCC)    Multifocal pneumonia    Acute cystitis with hematuria    Past Medical History  Past Medical History:   Diagnosis Date    Allergic rhinitis     last assessed 9/12/12    Finger fracture, right     Closed fx of the middle phalanx of the right 5th finger  last assessed 1/30/14    GI bleed 2/22/2019    Hemorrhoids     last assessed 2/10/14    Hyperlipidemia     Hypertension     Stroke [facility])      Past Surgical History  Past Surgical History:   Procedure Laterality Date    AORTIC VALVE REPLACEMENT  07/30/2014    AVR with 25mm [facility] Ease bovine pericardial valve    CARDIAC CATHETERIZATION  07/18/2014    SLB left main-normal, circumflex-normal, ramus intermedius-normal, RCA was large and dominant giving rise to the PDA & a large posterolateral branch  No disease  last assessed 8/19/14     COLONOSCOPY N/A 2/25/2019    Procedure: COLONOSCOPY;  Surgeon: Phyllis Baldwin DO;  [facility]; Service: Gastroenterology    ESOPHAGOGASTRODUODENOSCOPY N/A 2/22/2019    Procedure: ESOPHAGOGASTRODUODENOSCOPY (EGD)-roadshow overnight;  Surgeon: Phyllis Baldwin DO;  [facility]; Service: Gastroenterology    ESOPHAGOGASTRODUODENOSCOPY N/A 2/23/2019    Procedure: ESOPHAGOGASTRODUODENOSCOPY (EGD); Surgeon: Mehnaz Santana MD;  [facility]; Service: Gastroenterology    ESOPHAGOGASTRODUODENOSCOPY N/A 2/25/2019    Procedure: ESOPHAGOGASTRODUODENOSCOPY (EGD); Surgeon: Phyllis Baldwin DO;  [facility];   Service: Gastroenterology    IR NON-TUNNELED CENTRAL LINE PLACEMENT  3/1/2019    IR NON-TUNNELED CENTRAL LINE PLACEMENT  2/4/2019    IR TUNNELED DIALYSIS CATHETER CHECK/CHANGE/REPOSITION/ANGIOPLASTY  4/18/2019    IR TUNNELED DIALYSIS CATHETER PLACEMENT  2/4/2019    IR TUNNELED DIALYSIS CATHETER PLACEMENT  2/18/2019    KNEE ARTHROSCOPY      KNEE CARTILAGE SURGERY Left     medial meniscus tear     TESTICLE SURGERY      TONSILLECTOMY AND ADENOIDECTOMY      TOOTH EXTRACTION       Speech-Language Pathology Bedside Swallow Evaluation      Patient Name: Tyson Alvarez    WLVTG'X Date: 8/12/2021     Problem List  Principal Problem:    Acute on chronic respiratory failure with hypoxia and hypercapnia (HCC)  Active Problems:    Metabolic encephalopathy    Dysphagia    Paroxysmal atrial fibrillation (HCC)    Chronic anemia    CKD (chronic kidney disease), stage III (HCC)    Acute on chronic diastolic (congestive) heart failure (HCC)    Multifocal pneumonia    Acute cystitis with hematuria      Past Medical History  Past Medical History:   Diagnosis Date    Allergic rhinitis     last assessed 9/12/12    Finger fracture, right     Closed fx of the middle phalanx of the right 5th finger  last assessed 1/30/14    GI bleed 2/22/2019    Hemorrhoids     last assessed 2/10/14    Hyperlipidemia     Hypertension     Stroke [facility])        Past Surgical History  Past Surgical History:   Procedure Laterality Date    AORTIC VALVE REPLACEMENT  07/30/2014    [facility]    CARDIAC CATHETERIZATION  07/18/2014    SLB left main-normal, circumflex-normal, ramus intermedius-normal, RCA was large and dominant giving rise to the PDA & a large posterolateral branch  No disease  last assessed 8/19/14     COLONOSCOPY N/A 2/25/2019    Procedure: COLONOSCOPY;  Surgeon: Paige Carrera DO;  [facility];   Service: Gastroenterology    ESOPHAGOGASTRODUODENOSCOPY N/A 2/22/2019    Procedure: ESOPHAGOGASTRODUODENOSCOPY (EGD)-roadshow overnight;  Surgeon: Nico Pike DO;  [facility]; Service: Gastroenterology    ESOPHAGOGASTRODUODENOSCOPY N/A 2/23/2019    Procedure: ESOPHAGOGASTRODUODENOSCOPY (EGD); Surgeon: Jodie Haro MD;  [facility]; Service: Gastroenterology    ESOPHAGOGASTRODUODENOSCOPY N/A 2/25/2019    Procedure: ESOPHAGOGASTRODUODENOSCOPY (EGD); Surgeon: Nico Pike DO;  [facility]; Service: Gastroenterology    IR NON-TUNNELED CENTRAL LINE PLACEMENT  3/1/2019    IR NON-TUNNELED CENTRAL LINE PLACEMENT  2/4/2019    IR TUNNELED DIALYSIS CATHETER CHECK/CHANGE/REPOSITION/ANGIOPLASTY  4/18/2019    IR TUNNELED DIALYSIS CATHETER PLACEMENT  2/4/2019    IR TUNNELED DIALYSIS CATHETER PLACEMENT  2/18/2019    KNEE ARTHROSCOPY      KNEE CARTILAGE SURGERY Left     medial meniscus tear     TESTICLE SURGERY      TONSILLECTOMY AND ADENOIDECTOMY      TOOTH EXTRACTION         Summary   Pt presented s/s suggestive of at least mild-moderate oropharyngeal dysphagia  Symptoms or concerns included decreased mastication and suspected decreased control of liquids , as well as suspected pharyngeal swallow delay and suspected decreased hyolaryngeal elevation upon palpation  Pt known to have hx of silent aspiration, baseline diet suspected to be soft solids and ?nectar/thin  No overt s/s aspiration at bedside w any solids/liquids presented, however d/t hx of silent aspiration and recent chest imaging recommend downgrade to level 3/dental soft and nectar liquids  ST to follow for tolerance, ?consider MBS to r/o aspiration and determine the safest, least restrictive diet      Risk/s for Aspiration: present, suspect mild-moderate     Recommended Diet: soft/level 3 diet and nectar thick liquids   Recommended Form of Meds: as tolerated   Aspiration precautions and swallowing strategies: upright posture, slow rate of feeding, small bites/sips and alternating bites and sips  Other Recommendations: Continue frequent oral care        Current Medical Status  History is obtained via review of medical records and discussion with the ED staff  Patient is a 48-year-old male with past medical history significant for atrial fibrillation, chronic respiratory failure hypoxia [facility] ED [facility]  Unfortunately he is confused and unable to relate a history    Current Precautions:  Fall  Aspiration  Contact    Allergies:  No known food allergies    Past medical history:  Please see H&P for details    Special Studies:  CT chest 8/11/2021: Lungs are distorted by respiratory motion artifact but there is suggestion of focally increased groundglass opacity in the left upper lobe concerning for pneumonia  Recommend follow-up chest CT in 3 months to document resolution  Pulmonary vascular congestion  Mild pulmonary edema is difficult to exclude given central atelectatic opacities from expiratory phase of imaging  Persistent peripheral consolidation in the right lower lobe when compared to January, though with resolution of previously seen dense pneumonia/mucous plugging  Chronic consolidation may represent postinfectious pleuroparenchymal scarring, but also warrants attention on follow-up CT, recommended above  Ascending thoracic aortic ectasia --unchanged since January  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  Patient is at increased risk for aspiration  XR chest 8/11/2021: Right basilar and left midlung field consolidations concerning for multifocal pneumonia      Social/Education/Vocational Hx:  Pt lives in SNF/ECF    Swallow Information   Current Risks for Dysphagia & Aspiration: known history of dysphagia and AMS  Current Symptoms/Concerns: chest imaging w concerns of aspiration PNA  Current Diet: regular diet and thin liquids   Baseline Diet: ? per last ST note diet of level 2/nectar was recommended however RN reports pt was tolerating thin liquids at [facility]      Baseline Assessment   Behavior/Cognition: alert  Speech/Language Status: able to participate in basic conversation and able to follow commands  Patient Positioning: upright in bed  Pain Status/Interventions/Response to Interventions: Perseverated on neck pain however repositioning was unsuccessful       Swallow Mechanism Exam  Facial: symmetrical  Labial: bilateral decreased ROM  Lingual: decreased ROM and decreased coordination  Velum: unable to visualize  Mandible: adequate ROM  Dentition: edentulous  Vocal quality:clear/adequate   Volitional Cough: fair   Respiratory Status: on 3L O2      Consistencies Assessed and Performance   Consistencies Administered: thin liquids, nectar thick, honey thick, puree, mechanical soft solids and hard solids      Oral Stage: mild-moderate  Mastication was mildly rapid and suspected to be decreased with the upgraded solids administered today  Bolus formation and transfer were functional with no significant oral residue noted  No overt s/s reduced oral control  Pharyngeal Stage: mild-moderate  Swallow Mechanics:  Swallowing initiation appeared mildly delayed  Laryngeal rise was difficult to palpate d/t positioning in bed and complaints of neck pain however suspected to be at least mildly reduced, however  No coughing, throat clearing, change in vocal quality or respiratory status noted today, however it was noted pt has hx of silent aspiration       Esophageal Concerns: none reported      Summary and Recommendations (see above)    Results Reviewed with: patient and RN     Treatment Recommended: yes     Frequency of treatment: ?consider MBS to r/o aspiration as pt is known to have h/o silent aspiration; 2-3x f/u for tolerance of diet      Dysphagia LTG  -Patient will demonstrate safe and effective oral intake (without overt s/s significant oral/pharyngeal dysphagia including s/s penetration or aspiration) for the highest appropriate diet level      Short Term Goals:  -Pt will tolerate Dysphagia 3/advanced (dental soft) diet and thin liquid with no significant s/s oral or pharyngeal dysphagia across 1-3 diagnostic session/s     -Patient will comply with a Video/Modified Barium Swallow study for more complete assessment of swallowing anatomy/physiology/aspiration risk and to assess efficacy of treatment techniques so as to best guide treatment plan

## 2021-08-12 NOTE — UTILIZATION REVIEW
Initial Clinical Review    Admission: Date/Time/Statement:   Admission Orders (From admission, onward)     Ordered        08/11/21 1404  Inpatient Admission  Once                   Orders Placed This Encounter   Procedures    Inpatient Admission     Standing Status:   Standing     Number of Occurrences:   1     Order Specific Question:   Level of Care     Answer:   Level 1 Stepdown [13]     Order Specific Question:   Estimated length of stay     Answer:   More than 2 Midnights     Order Specific Question:   Certification     Answer:   I certify that inpatient services are medically necessary for this patient for a duration of greater than two midnights  See H&P and MD Progress Notes for additional information about the patient's course of treatment  ED Arrival Information     Expected Arrival Acuity    8/11/2021 8/11/2021 10:17 Emergent         Means of arrival Escorted by Service Admission type    Ambulance [facility]  [facility]) Hospitalist Emergency         Arrival complaint    Shortness of breath        Chief Complaint   Patient presents with    Shortness of Breath     [place] from [place] for increased shortness of breath starting lastnight  pt normally wears 2-4L of O2       Initial Presentation:     57year old male presents to ed from nursing home via ems for evaluation and treatment of increased shortness of breath while on normal 2-4 L O2  PMHX:  A fib, HTN, CVA  Clinical assessment significant for tachypnea, 91% O2 sat on 6L O2nc above baseline  ABG:  ph 7 34  pco2 79 8,  po2 103, hco3 42 8, imaging shows ROXANNA pneumonia, persistent consolidation in RLL  Ekg shows LBBB  Treated in ed with BIPAP, iv cefepime  Admit to inpatient for step down  acute on chronic respiratory failure with hypoxia, multifocal pneumonia, consult pulmonary, obtain sputum cultures, iv bumex bid        Consult pulmonology    Acute hypoxic and hypercarbic respiratory failure - likely multifactorial and related to underlying obesity hypoventilation syndrome ( is unclear whether not he was compliant with BiPAP therapy  Evidently when EMS arrived he was using CPAP set at 6 cm water )  Underlying pneumonia is also likely contributing to the acute respiratory failure  Additionally has underlying congestive heart failure      Pneumonia likely aspiration-   Previous barium swallow study was reviewed from 2019  Patient has right lower lobe infiltrates that are suggestive of recurrent aspirations      Obesity hypoventilation syndrome -   In the setting of morbid obesity  Congestive heart failure -  Presents with acute on chronic diastolic congestive Heart failure     History of COVID-19 pneumonia -  With previous prolonged hospitalization  Has been immobilized since that time      Acute encephalopathy likely due to underlying hypoxia and hypercapnia      Morbid obesity      Recommendations   Agree with current antibiotic regimen for empiric treatment of aspiration pneumonia: Cefepime and Flagyl  Also receiving vancomycin       Follow up on procalctonin, sputum culture, urine/legionella antigen  Would obtain blood cultures  Recommend MRSA swab  Deescalate antibiotic therapy depending on clinical improvement       Repeat ABG is pending  I anticipate that he will still be hypercarbic  At that point I would reinstitute BiPAP therapy with settings of 16/8 cm per water  Either way should use BiPAP 16/8 at night       Would continue NPO status for now  Once mental status is improved would restart diet with aspiration precautions      If hypercarbia and hypoxia have resolved  And encephalopathy continues will need evaluation for alternative causes      Diuresis per primary team for underlying heart failure  Continue other heart failure medications      Continue Eliquis for VTE prophylaxis      recommend repeat CT of the chest in 3 months to follow-up on infiltrates    Will need outpatient pulmonary follow-up  Date:  8-12-21 Day 2: inpatient     Continue  iv antibiotics, iv bumex  Wean from BIPAP to  O2 via nasal cannula           ED Triage Vitals   08/11/21 1052 08/11/21 1018 08/11/21 1018 08/11/21 1018 08/11/21 1018   98 °F (36 7 °C) 90 (!) 24 105/53 91 %      Temporal Monitor         Pain Score       3          08/12/21 (!) 156 kg (344 lb 2 2 oz)     Additional Vital Signs:     Date/  Time  Temp  Pulse  Resp  BP  MAP  SpO2  FiO2 (%)  Nasal Cannula O2 Flow Rate (L/min)  O2 Device    08/12/21 0804  97 2 °F (36 2°C)  Abnormal   96  --  130/74  94  98 %  --  3 L/min  Nasal cannula    08/12/21 0736  --  95  34  Abnormal   --  --  98 %  --  3 L/min  Nasal cannula    08/12/21 0600  --  88  22  95/51  69  96 %  30  --  BiPAP    08/12/21 0500  --  88  17  110/56  81  96 %  30  --  BiPAP    08/12/21 0450  --  --  --  --  --  96 %  30  --  BiPAP    08/12/21 0400  --  85  21  103/56  75  95 %  30  --  BiPAP    08/12/21 0308  --  83  19  142/65  94  95 %  30  --  BiPAP    08/12/21 0303  98 4 °F (36 9 °C)  --  --  --  --  --  --  --  --    08/12/21 0200  --  83  17  94/51  69  97 %  30  --  BiPAP    08/12/21 0109  --  82  19  102/52  73  97 %  30  --  BiPAP    08/12/21 0026  --  --  --  --  --  95 %  30  --  BiPAP    08/12/21 0000  --  85  18  92/54  70  97 %  30  --  BiPAP    08/11/21 2300  --  89  24Abnormal   145/65  93  95 %  30  --  BiPAP    08/11/21 2257  98 °F (36 7 °C)  --  --  --  --  --  --  --  --    08/11/21 2200  --  76  22  92/53  67  95 %  30  --  BiPAP    08/11/21 2100  --  84  21  118/62  84  94 %  30  --  BiPAP    08/11/21 2033  --  --  --  --  --  98 %  30  --  BiPAP    08/11/21 2000  --  80  21  97/53  71  98 %  40  --  BiPAP    08/11/21 1915  --  --  --  114/82  94  --  --  --  --    08/11/21 1902  98 2 °F (36 8 °C)  84  18  --  --  99 %  40  --  BiPAP    08/11/21 1810  --  90  23  Abnormal   108/56  73  98 %  40  --  BiPAP    08/11/21 1700  --  79  21  103/56  74  100 %  --  --  --    08/11/21 1600  --  82  25  Abnormal   102/58  75  99 %  --  --  --    08/11/21 1535  --  80  20  --  --  97 %  --  --  --    08/11/21 1500  98 °F (36 7 °C)  85  21  137/63  90  97 %  --  4 L/min  Nasal cannula    08/11/21 1415  --  81  21  107/59  79  99 %  --  --  BiPAP    08/11/21 1330  --  83  18  103/60  71  97 %  --  --  None (Room air)    08/11/21 1300  --  82  --  112/56  80  99 %  --  --  BiPAP    08/11/21 1230  --  83  18  102/55  75  100 %  --  --  BiPAP    08/11/21 1130  --  82  24Abnormal   91/50  65  99 %  --  --  BiPAP    08/11/21 1103  --  --  --  --  --  --  --  --  BiPAP    08/11/21 1100  --  83  18  93/51  66  91 %  --  --  BiPAP    08/11/21 1052  98 °F (36 7 °C)  --  --  --  --  --  --  --  --                Pertinent Labs/Diagnostic Test Results:   Collection Time Result Time Vent R Atrial R MI Int  QRSD Int  QT Int  QTC Int  P Axis QRS Axis T Wave Ax    08/11/21 10:23:01 08/11/21 12:05:41 89 89 208 148 392 476 49 42 107                     Normal sinus rhythm   Left bundle branch block   Abnormal ECG                   CT chest without contrast   Final  (08/11 1400)      1  Lungs are distorted by respiratory motion artifact but there is suggestion of focally increased groundglass opacity in the left upper lobe concerning for pneumonia  Recommend follow-up chest CT in 3 months to document resolution  2   Pulmonary vascular congestion  Mild pulmonary edema is difficult to exclude given central atelectatic opacities from expiratory phase of imaging  3   Persistent peripheral consolidation in the right lower lobe when compared to January, though with resolution of previously seen dense pneumonia/mucous plugging  Chronic consolidation may represent postinfectious pleuroparenchymal scarring, but also warrants attention on follow-up CT, recommended above  4   Ascending thoracic aortic ectasia --unchanged since January        5   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  Patient is at increased risk for aspiration  XR chest 1 view portable   Final  (08/11 1211)      Right basilar and left midlung field consolidations concerning for multifocal pneumonia          Results from last 7 days   Lab Units 08/11/21  1034   SARS-COV-2  Negative     Results from last 7 days   Lab Units 08/12/21  0444 08/11/21  1515 08/11/21  1034   WBC Thousand/uL 5 63  --  7 40   HEMOGLOBIN g/dL 9 0*  --  9 1*   I STAT HEMOGLOBIN g/dl  --  11 6*  --    HEMATOCRIT % 34 8*  --  35 2*   HEMATOCRIT, ISTAT %  --  34*  --    PLATELETS Thousands/uL 170  --  198   NEUTROS ABS Thousands/µL 4 71  --  6 28         Results from last 7 days   Lab Units 08/12/21  0444 08/11/21  1515 08/11/21  1034   SODIUM mmol/L 143  --  144   POTASSIUM mmol/L 3 8  --  4 7   CHLORIDE mmol/L 103  --  102   CO2 mmol/L 40*  --  43*   CO2, I-STAT mmol/L  --  >45*  --    ANION GAP mmol/L 0*  --  -1*   BUN mg/dL 32*  --  33*   CREATININE mg/dL 1 34*  --  1 45*   EGFR ml/min/1 73sq m 56  --  51   CALCIUM mg/dL 8 8  --  9 1   MAGNESIUM mg/dL  --   --  2 6     Results from last 7 days   Lab Units 08/11/21  1034   AST U/L 9   ALT U/L 13   ALK PHOS U/L 90   TOTAL PROTEIN g/dL 7 6   ALBUMIN g/dL 3 4*   TOTAL BILIRUBIN mg/dL 0 47   AMMONIA umol/L 13         Results from last 7 days   Lab Units 08/12/21  0444 08/11/21  1034   GLUCOSE RANDOM mg/dL 96 97        Results from last 7 days   Lab Units 08/11/21  1111   PH ART  7 347*   PCO2 ART mm Hg 79 8*   PO2 ART mm Hg 103 6   HCO3 ART mmol/L 42 8*   BASE EXC ART mmol/L 14 5   O2 CONTENT ART mL/dL 13 5*   O2 HGB, ARTERIAL % 96 5   ABG SOURCE  Brachial, Right         Results from last 7 days   Lab Units 08/11/21  1515   I STAT BASE EXC mmol/L 16*   I STAT O2 SAT % 90*   ISTAT PH ART  7 331*   I STAT ART PCO2 mm HG 85 9*   I STAT ART PO2 mm HG 68 0*   I STAT ART HCO3 mmol/L 45 4*         Results from last 7 days   Lab Units 08/11/21  1754 08/11/21  1522 08/11/21  1034   TROPONIN I ng/mL <0 02 <0 02 <0 02     Results from last 7 days   Lab Units 08/11/21  1034   D-DIMER QUANTITATIVE ug/ml FEU 0 54*     Results from last 7 days   Lab Units 08/11/21  1034   PROTIME seconds 14 9*   INR  1 20*   PTT seconds 26       Results from last 7 days   Lab Units 08/11/21  1034   LACTIC ACID mmol/L 1 1       Results from last 7 days   Lab Units 08/11/21  1034   NT-PRO BNP pg/mL 2,390*       Results from last 7 days   Lab Units 08/11/21  1749   CLARITY UA  Clear   COLOR UA  Yellow   SPEC GRAV UA  1 015   PH UA  6 0   GLUCOSE UA mg/dl Negative   KETONES UA mg/dl Negative   BLOOD UA  Small*   PROTEIN UA mg/dl Trace*   NITRITE UA  Positive*   BILIRUBIN UA  Negative   UROBILINOGEN UA E U /dl 0 2   LEUKOCYTES UA  Moderate*   WBC UA /hpf 30-50*   RBC UA /hpf 2-4   BACTERIA UA /hpf Occasional   EPITHELIAL CELLS WET PREP /hpf Occasional     Results from last 7 days   Lab Units 08/11/21  1749   STREP PNEUMONIAE ANTIGEN, URINE  Negative   LEGIONELLA URINARY ANTIGEN  Negative       ED Treatment:   Medication Administration from 08/11/2021 1017 to 08/11/2021 1440       Date/Time Order Dose Route Action     08/11/2021 1407 cefepime (MAXIPIME) IVPB (premix in dextrose) 2,000 mg 50 mL 2,000 mg Intravenous New Bag        Past Medical History:   Diagnosis Date    Allergic rhinitis     last assessed 9/12/12    Finger fracture, right     Closed fx of the middle phalanx of the right 5th finger  last assessed 1/30/14    GI bleed 2/22/2019    Hemorrhoids     last assessed 2/10/14    Hyperlipidemia     Hypertension     Stroke [facility])      Present on Admission:   Paroxysmal atrial fibrillation (HCC)   Dysphagia   Acute on chronic respiratory failure with hypoxia and hypercapnia (HCC)   CKD (chronic kidney disease), stage III (HCC)   Chronic anemia      Admitting Diagnosis:     Confusion [R41 0]  Hypercarbia [R06 89]  Pneumonia [J18 9]  SOB (shortness of breath) [R06 02]  Hypoxia [R09 02]  Multifocal pneumonia [J18 9]      Age/Sex: 57 y o  male    Scheduled Medications:    allopurinol, 100 mg, Oral, Daily  apixaban, 2 5 mg, Oral, BID  aspirin, 81 mg, Oral, Daily  atorvastatin, 40 mg, Oral, Daily With Dinner  b complex-vitamin C-folic acid, 1 capsule, Oral, Daily With Dinner  bumetanide, 1 mg, Intravenous, BID  cefepime, 2,000 mg, Intravenous, Q12H  diltiazem, 180 mg, Oral, Daily  magnesium oxide, 400 mg, Oral, Daily  metoprolol tartrate, 50 mg, Oral, TID  metroNIDAZOLE, 500 mg, Intravenous, Q8H  pantoprazole, 40 mg, Oral, BID AC  sertraline, 100 mg, Oral, Daily  vancomycin, 1,500 mg, Intravenous, Q12H      Continuous IV Infusions:     PRN Meds:  albuterol, 2 5 mg, Nebulization, Q4H PRN        IP CONSULT TO PULMONOLOGY  IP CONSULT TO PHARMACY    Network Utilization Review Department  ATTENTION: Please call with any questions or concerns to [phone] and carefully listen to the prompts so that you are directed to the right person  All voicemails are confidential   Frances Keenan all requests for admission clinical reviews, approved or denied determinations and any other requests to dedicated fax number below belonging to the campus where the patient is receiving treatment   List of dedicated fax numbers for the Facilities:  [address] DENIALS (Administrative/Medical Necessity) [phone]   [address] (Maternity/NICU/Pediatrics) [phone]   [address] Wally Sykes [id number] [place] [address] Radha Henry 1481 [address] [facility] [phone]

## 2021-08-12 NOTE — NURSING NOTE
Pt c/o itching, continues to scratch  Pt is red on upper torso/b/l arms  Pt stating it was after I bathed him with chlorhexidine but that was much earlier in the day  He started complaining after the flagyl was almost completed infusing  Pt was bathed again with normal soap, also  Dr Hugo Yusuf made aware  Will continue to monitor patient

## 2021-08-12 NOTE — PROGRESS NOTES
[facility]  Progress Note - Mayte Johnson 1960, 57 y o  male MRN: [id number]  Unit/Bed#:  Encounter: [id number]  Primary Care Provider: Carole Rashid DO   Date and time admitted to hospital: 8/11/2021 10:17 AM    Acute cystitis with hematuria  Assessment & Plan  Day 2 cefepime  Follow-up urine culture    Multifocal pneumonia  Assessment & Plan  Without fever or wbc count , it's unclear if findings are acute or sequale of covid infection this year  Possible aspiration vs  HCAP vs  Gram negative pna  Day 2 vanco/cefepime/Flagyl  Follow-up Pro-calcitonin and trend  Check urine legionella and strep-pneumo antigen  Pulmonary input appreciated    Acute on chronic diastolic (congestive) heart failure (HCC)  Assessment & Plan  Wt Readings from Last 3 Encounters:   07/14/21 (!) 155 kg (342 lb 3 2 oz)   03/04/21 (!) 153 kg (338 lb 3 oz)   10/22/20 (!) 156 kg (344 lb)     Chronically on bumex 1mg BID ,   Day 2 Bumex 1 mg IV b i d   I&O, daily weights, fluid restrict  Renal function improving with diuresis    CKD (chronic kidney disease), stage III [facility])  Assessment & Plan  Lab Results   Component Value Date    EGFR 51 08/11/2021    EGFR 66 03/04/2021    EGFR 58 03/03/2021    CREATININE 1 45 (H) 08/11/2021    CREATININE 1 19 03/04/2021    CREATININE 1 32 (H) 03/03/2021     Baseline creatinine 1 1-1 4  Monitor renal function closely with diuresis     Chronic anemia  Assessment & Plan  Baseline hemoglobin around 8-10     Paroxysmal atrial fibrillation (HCC)  Assessment & Plan  Will hold cardizem due to hypotension  Continue metoprolol   [facility]    Dysphagia  Assessment & Plan  History of dysphagia with CT concerning aspiration  Will obtain a speech eval      Metabolic encephalopathy  Assessment & Plan  Secondary to hypercapnia versus UTI  CT head negative   Significantly improved    * Acute on chronic respiratory failure with hypoxia and hypercapnia (HCC)  Assessment & Plan  Pulmonary input appreciated  Improving, patient saturating well on baseline utilization 2 L nasal cannula        Progress Note - Quique Nolan 57 y o  male MRN: [id number]    Unit/Bed#:  Encounter: [id number]        [facility]ive:   [facility]  [facility]'s awake and oriented this morning  No acute complaints    Objective:     Vitals:   Vitals:    08/12/21 0804   BP: 130/74   Pulse: 96   Resp:    Temp: (!) 97 2 °F (36 2 °C)   SpO2: 98%     Body mass index is 48 kg/m²      Intake/Output Summary (Last 24 hours) at 8/12/2021 0821  Last data filed at 8/12/2021 3195  Gross per 24 hour   Intake 340 ml   Output 640 ml   Net -300 ml       Physical Exam:   /74 (BP Location: Right arm)   Pulse 96   Temp (!) 97 2 °F (36 2 °C) (Tympanic)   Resp (!) 34   Ht 5' 11" (1 803 m)   Wt (!) 156 kg (344 lb 2 2 oz)   SpO2 98%   BMI 48 00 kg/m²   General appearance: alert and oriented, in no acute distress  Head: Normocephalic, without obvious abnormality, atraumatic  Lungs: clear to auscultation bilaterally  Heart: regular rate and rhythm, S1, S2 normal, no murmur, click, rub or gallop  Abdomen: soft, non-tender; bowel sounds normal; no masses,  no organomegaly  Extremities: extremities normal, warm and well-perfused; no cyanosis, clubbing, or edema  Pulses: 2+ and symmetric  Neurologic: Grossly normal     Invasive Devices     Peripheral Intravenous Line            Peripheral IV 08/11/21 Left Antecubital 1 day                Results from last 7 days   Lab Units 08/12/21 0444 08/11/21  1515 08/11/21  1034   WBC Thousand/uL 5 63  --  7 40   HEMOGLOBIN g/dL 9 0*  --  9 1*   I STAT HEMOGLOBIN g/dl  --  11 6*  --    HEMATOCRIT % 34 8*  --  35 2*   HEMATOCRIT, ISTAT %  --  34*  --    PLATELETS Thousands/uL 170  --  198       Results from last 7 days   Lab Units 08/12/21  0444 08/11/21  1515 08/11/21  1034   POTASSIUM mmol/L 3 8  --  4 7   CHLORIDE mmol/L 103  --  102   CO2 mmol/L 40*  --  43*   CO2, I-STAT mmol/L  --  >45*  --    BUN mg/dL 32*  --  33*   CREATININE mg/dL 1 34*  --  1 45*   CALCIUM mg/dL 8 8  --  9 1   ALK PHOS U/L  --   --  90   ALT U/L  --   --  13   AST U/L  --   --  9   GLUCOSE, ISTAT mg/dl  --  101  --        Medication Administration - last 24 hours from 08/11/2021 0821 to 08/12/2021 8966       Date/Time Order Dose Route Action Action by     08/11/2021 1500 vancomycin (VANCOCIN) 2,000 mg in sodium chloride 0 9 % 500 mL IVPB 2,000 mg Intravenous New Bag formerly [facility]     08/11/2021 1450 cefepime (MAXIPIME) IVPB (premix in dextrose) 2,000 mg 50 mL 0 mg Intravenous Stopped Cort Homans, YUDITH     08/11/2021 1407 cefepime (MAXIPIME) IVPB (premix in dextrose) 2,000 mg 50 mL 2,000 mg Intravenous Jorge Murray Se, RN     08/12/2021 0807 allopurinol (ZYLOPRIM) tablet 100 mg 100 mg Oral Given Roslyn Moreland, RN     08/12/2021 2260 apixaban (ELIQUIS) tablet 2 5 mg 2 5 mg Oral Given Roslyn Moreland, YUDITH     08/11/2021 1701 apixaban (ELIQUIS) tablet 2 5 mg 2 5 mg Oral Not Given Padmini Jimenez, YUDITH     08/12/2021 6482 aspirin (ECOTRIN LOW STRENGTH) EC tablet 81 mg 81 mg Oral Given Roslyn Moreland, YUDITH     08/11/2021 1536 atorvastatin (LIPITOR) tablet 40 mg 40 mg Oral Not Given Padmini Jimenez RN     08/11/2021 1536 b complex-vitamin C-folic acid (NEPHROCAPS) capsule 1 capsule 1 capsule Oral Not Given Padmini Jimenez RN     08/12/2021 6063 diltiazem (CARDIZEM CD) 24 hr capsule 180 mg 180 mg Oral Given Roslyn Moreland RN     08/12/2021 0614 pantoprazole (PROTONIX) EC tablet 40 mg 40 mg Oral Not Given Alfredia Holter, YUDITH     08/11/2021 1536 pantoprazole (PROTONIX) EC tablet 40 mg 40 mg Oral Not Given Padmini Jimenez, RN     08/12/2021 4966 metoprolol tartrate (LOPRESSOR) tablet 50 mg 50 mg Oral Given Roslyn Moreland, RN     08/11/2021 2024 metoprolol tartrate (LOPRESSOR) tablet 50 mg 50 mg Oral Not Given Alfredia Holter, YUDITH     08/11/2021 1536 metoprolol tartrate (LOPRESSOR) tablet 50 mg 50 mg Oral Not Given Sharon Figueroa RN     08/12/2021 4783 sertraline (ZOLOFT) tablet 100 mg 100 mg Oral Given Marielena Greene RN     08/12/2021 1601 magnesium oxide (MAG-OX) tablet 400 mg 400 mg Oral Given Marielena Greene RN     08/12/2021 6178 vancomycin (VANCOCIN) 1,500 mg in sodium chloride 0 9 % 500 mL IVPB 1,500 mg Intravenous [address] Haydee Edwards RN     08/12/2021 0251 cefepime (MAXIPIME) IVPB (premix in dextrose) 2,000 mg 50 mL 0 mg Intravenous Stopped Haydee Edwards RN     08/12/2021 0211 cefepime (MAXIPIME) IVPB (premix in dextrose) 2,000 mg 50 mL 2,000 mg Intravenous New [address] [place] [address] Abhinav Greenwood RN     08/12/2021 0809 bumetanide (BUMEX) injection 1 mg 1 mg Intravenous Given Marielena Greene RN     08/11/2021 1710 bumetanide (BUMEX) injection 1 mg 1 mg Intravenous Given Sharon Figueroa RN     08/12/2021 0211 metroNIDAZOLE (FLAGYL) IVPB (premix) 500 mg 100 mL 0 mg Intravenous Stopped Haydee Edwards RN     08/12/2021 0133 metroNIDAZOLE (FLAGYL) IVPB (premix) 500 mg 100 mL 500 mg Intravenous New [address] [facility] Haydee Edwards RN     08/11/2021 1822 metroNIDAZOLE (FLAGYL) IVPB (premix) 500 mg 100 mL 0 mg Intravenous Stopped Haydee Edwards RN     08/11/2021 1740 metroNIDAZOLE (FLAGYL) IVPB (premix) 500 mg 100 mL 500 mg Intravenous New Bag Sharon Figueroa RN            Lab, Imaging and other studies: I have personally reviewed pertinent reports      VTE Pharmacologic Prophylaxis: eliquis   VTE Mechanical Prophylaxis: sequential compression device     Elijah Hutchins MD  8/12/2021,8:21 AM

## 2021-08-13 LAB
ALBUMIN SERPL BCP-MCNC: 2.8 G/DL (ref 3.5–5)
ALP SERPL-CCNC: 78 U/L (ref 46–116)
ALT SERPL W P-5'-P-CCNC: 13 U/L (ref 12–78)
ANION GAP SERPL CALCULATED.3IONS-SCNC: 2 MMOL/L (ref 4–13)
AST SERPL W P-5'-P-CCNC: 12 U/L (ref 5–45)
BASOPHILS # BLD AUTO: 0.02 THOUSANDS/ΜL (ref 0–0.1)
BASOPHILS NFR BLD AUTO: 0 % (ref 0–1)
BILIRUB SERPL-MCNC: 0.57 MG/DL (ref 0.2–1)
BUN SERPL-MCNC: 35 MG/DL (ref 5–25)
CALCIUM ALBUM COR SERPL-MCNC: 9.2 MG/DL (ref 8.3–10.1)
CALCIUM SERPL-MCNC: 8.2 MG/DL (ref 8.3–10.1)
CHLORIDE SERPL-SCNC: 101 MMOL/L (ref 100–108)
CO2 SERPL-SCNC: 37 MMOL/L (ref 21–32)
CREAT SERPL-MCNC: 1.58 MG/DL (ref 0.6–1.3)
EOSINOPHIL # BLD AUTO: 1.04 THOUSAND/ΜL (ref 0–0.61)
EOSINOPHIL NFR BLD AUTO: 9 % (ref 0–6)
ERYTHROCYTE [DISTWIDTH] IN BLOOD BY AUTOMATED COUNT: 20.2 % (ref 11.6–15.1)
GFR SERPL CREATININE-BSD FRML MDRD: 46 ML/MIN/1.73SQ M
GLUCOSE SERPL-MCNC: 111 MG/DL (ref 65–140)
HCT VFR BLD AUTO: 36.1 % (ref 36.5–49.3)
HGB BLD-MCNC: 9.5 G/DL (ref 12–17)
IMM GRANULOCYTES # BLD AUTO: 0.09 THOUSAND/UL (ref 0–0.2)
IMM GRANULOCYTES NFR BLD AUTO: 1 % (ref 0–2)
LYMPHOCYTES # BLD AUTO: 0.25 THOUSANDS/ΜL (ref 0.6–4.47)
LYMPHOCYTES NFR BLD AUTO: 2 % (ref 14–44)
MCH RBC QN AUTO: 21.7 PG (ref 26.8–34.3)
MCHC RBC AUTO-ENTMCNC: 26.3 G/DL (ref 31.4–37.4)
MCV RBC AUTO: 82 FL (ref 82–98)
MONOCYTES # BLD AUTO: 0.82 THOUSAND/ΜL (ref 0.17–1.22)
MONOCYTES NFR BLD AUTO: 7 % (ref 4–12)
MRSA NOSE QL CULT: ABNORMAL
MRSA NOSE QL CULT: ABNORMAL
NEUTROPHILS # BLD AUTO: 9.84 THOUSANDS/ΜL (ref 1.85–7.62)
NEUTS SEG NFR BLD AUTO: 81 % (ref 43–75)
NRBC BLD AUTO-RTO: 0 /100 WBCS
PLATELET # BLD AUTO: 177 THOUSANDS/UL (ref 149–390)
PMV BLD AUTO: 11 FL (ref 8.9–12.7)
POTASSIUM SERPL-SCNC: 3.5 MMOL/L (ref 3.5–5.3)
PROCALCITONIN SERPL-MCNC: 0.18 NG/ML
PROT SERPL-MCNC: 6.5 G/DL (ref 6.4–8.2)
RBC # BLD AUTO: 4.38 MILLION/UL (ref 3.88–5.62)
SODIUM SERPL-SCNC: 140 MMOL/L (ref 136–145)
VANCOMYCIN TROUGH SERPL-MCNC: 37.2 UG/ML (ref 10–20)
WBC # BLD AUTO: 12.06 THOUSAND/UL (ref 4.31–10.16)

## 2021-08-13 PROCEDURE — 84145 PROCALCITONIN (PCT): CPT | Performed by: FAMILY MEDICINE

## 2021-08-13 PROCEDURE — 87086 URINE CULTURE/COLONY COUNT: CPT | Performed by: FAMILY MEDICINE

## 2021-08-13 PROCEDURE — 80053 COMPREHEN METABOLIC PANEL: CPT | Performed by: FAMILY MEDICINE

## 2021-08-13 PROCEDURE — 85025 COMPLETE CBC W/AUTO DIFF WBC: CPT | Performed by: FAMILY MEDICINE

## 2021-08-13 PROCEDURE — 99232 SBSQ HOSP IP/OBS MODERATE 35: CPT | Performed by: FAMILY MEDICINE

## 2021-08-13 PROCEDURE — 94760 N-INVAS EAR/PLS OXIMETRY 1: CPT

## 2021-08-13 PROCEDURE — 80202 ASSAY OF VANCOMYCIN: CPT | Performed by: FAMILY MEDICINE

## 2021-08-13 PROCEDURE — 99232 SBSQ HOSP IP/OBS MODERATE 35: CPT | Performed by: INTERNAL MEDICINE

## 2021-08-13 PROCEDURE — 94660 CPAP INITIATION&MGMT: CPT

## 2021-08-13 RX ADMIN — ASPIRIN 81 MG: 81 TABLET, COATED ORAL at 08:25

## 2021-08-13 RX ADMIN — DIPHENHYDRAMINE HYDROCHLORIDE 25 MG: 25 TABLET ORAL at 02:59

## 2021-08-13 RX ADMIN — ALLOPURINOL 100 MG: 100 TABLET ORAL at 08:25

## 2021-08-13 RX ADMIN — PANTOPRAZOLE SODIUM 40 MG: 40 TABLET, DELAYED RELEASE ORAL at 06:01

## 2021-08-13 RX ADMIN — VANCOMYCIN HYDROCHLORIDE 1500 MG: 1 INJECTION, POWDER, LYOPHILIZED, FOR SOLUTION INTRAVENOUS at 06:09

## 2021-08-13 RX ADMIN — CEFEPIME HYDROCHLORIDE 2000 MG: 2 INJECTION, SOLUTION INTRAVENOUS at 14:20

## 2021-08-13 RX ADMIN — DIPHENHYDRAMINE HYDROCHLORIDE 25 MG: 25 TABLET ORAL at 21:59

## 2021-08-13 RX ADMIN — MAGNESIUM OXIDE TAB 400 MG (241.3 MG ELEMENTAL MG) 400 MG: 400 (241.3 MG) TAB at 09:49

## 2021-08-13 RX ADMIN — Medication 1 CAPSULE: at 15:55

## 2021-08-13 RX ADMIN — ACETAMINOPHEN 650 MG: 325 TABLET, FILM COATED ORAL at 19:08

## 2021-08-13 RX ADMIN — APIXABAN 2.5 MG: 2.5 TABLET, FILM COATED ORAL at 18:41

## 2021-08-13 RX ADMIN — ATORVASTATIN CALCIUM 40 MG: 40 TABLET, FILM COATED ORAL at 15:55

## 2021-08-13 RX ADMIN — APIXABAN 2.5 MG: 2.5 TABLET, FILM COATED ORAL at 08:25

## 2021-08-13 RX ADMIN — PANTOPRAZOLE SODIUM 40 MG: 40 TABLET, DELAYED RELEASE ORAL at 15:30

## 2021-08-13 RX ADMIN — SERTRALINE HYDROCHLORIDE 100 MG: 100 TABLET ORAL at 08:25

## 2021-08-13 RX ADMIN — DIPHENHYDRAMINE HYDROCHLORIDE 25 MG: 25 TABLET ORAL at 15:29

## 2021-08-13 RX ADMIN — CEFEPIME HYDROCHLORIDE 2000 MG: 2 INJECTION, SOLUTION INTRAVENOUS at 02:54

## 2021-08-13 NOTE — RESPIRATORY THERAPY NOTE
08/12/21 2300   Respiratory Assessment   Assessment Type Assess only   General Appearance Awake;Drowsy   Respiratory Pattern Normal   Chest Assessment Chest expansion symmetrical   Bilateral Breath Sounds Diminished;Clear   Resp Comments pt placed on bipap for hs blanca well    O2 Device bipap    Non-Invasive Information   O2 Interface Device Full face mask   Non-Invasive Ventilation Mode BiPAP  (v60)   $ Pulse Oximetry Spot Check Charge Completed   Non-Invasive Settings   IPAP (cm) 16 cm   EPAP (cm) 8 cm   Rate (Set) 8   FiO2 (%) 30   Flow (lpm) 48   Pressure Support (cm H2O) 8   Rise Time 3   Inspiratory Time (Set) 1 5   Non-Invasive Readings   Total Rate 30   Peak Pressure (Obs) 16   Spontaneous Vt (mL) 426   I/E Ratio (Obs) 1:5   Leak (lpm) 0   Skin Intervention Skin intact   Spontaneous MV (mL) 13 7   Non-Invasive Alarms   Insp Pressure High (cm H20) 24   Insp Pressure Low (cm H20) 4   Low Insp Pressure Time (sec) 20 sec   MV Low (L/min) 4   Vt High (mL) 1500   Vt Low (mL) 200   High Resp Rate (BPM) 40 BPM   Low Resp Rate (BPM) 6 BPM   Apnea Interval (sec) 20   Apnea Rate 8   Apnea Pressure 16

## 2021-08-13 NOTE — PROGRESS NOTES
Progress Note - Pulmonary   Harry Babcock 57 y o  male MRN: [id number]  Unit/Bed#:  Encounter: [id number]    Assessment/Plan:    1  Acute hypoxic and hypercapnic respiratory failure   1  Currently on 2 L nasal cannula  Oxygen requirement at baseline are 2 L nasal cannula  2  Titrate oxygen to maintain SpO2 greater than or equal to 88%   3  Pulmonary toilet: Increase activity as tolerated, out of bed as tolerated, cough and deep breathing as encouraged, incentive spirometry q 1 hour  4  Continue BiPAP 16/8 during all hours of sleep-patient tolerated well overnight  2  Pneumonia   1  Imaging shows right lower lobe infiltrate suggestive of recurrent aspirations  2  Continue cefepime and vancomycin  3  Stopped Flagyl due to possible allergic reaction   4  Trend WBC, procalcitonin, temps  5  Speech evaluation revealed mild to moderate or pharyngeal decision recommend soft/level 3 diet and nectar thick liquids  6  Continue aspiration precautions  3  OHS/ANGELINA in the setting of morbid obesity   1  Recommend BiPAP while inpatient  2  Resume home BIPAP at nursing home  3  Will need compliance report to review at next follow up to determine whether or not we need to make changes  4  Acute cystitis with hematuria   1  May be contributing to encephalopathy   2  Treatment per primary team   5  Metabolic encephalopathy   1  Secondary to hypercapnia vs UTI   2  CT head negative  3  Continue to monitor- improving  6  History of COVID-19 pneumonia underlying acute encephalopathy  1  With previous prolonged hospitalization   2  No need for COVID directed therapies at this time    Chief Complaint:    "I'm okay "    Subjective:    Patient was seen examined today  No overnight events reported  Patient states that his breathing feels better compared to yesterday  Does not like the food that use being given  Denies other complaints      Objective:    Vitals: Blood pressure 92/51, pulse 97, temperature 98 7 °F (37 1 °C), temperature source Tympanic, resp  rate (!) 31, height 5' 11" (1 803 m), weight (!) 158 kg (348 lb 1 7 oz), SpO2 93 %  2L NC,Body mass index is 48 55 kg/m²  Intake/Output Summary (Last 24 hours) at 8/13/2021 1151  Last data filed at 8/13/2021 1001  Gross per 24 hour   Intake 1964 ml   Output 1075 ml   Net 889 ml       Invasive Devices     Peripheral Intravenous Line            Peripheral IV 08/13/21 Right Antecubital <1 day                Physical Exam:     Physical Exam  Vitals and nursing note reviewed  Constitutional:       General: He is not in acute distress  Appearance: He is obese  HENT:      Head: Normocephalic and atraumatic  Right Ear: External ear normal       Left Ear: External ear normal       Nose: Nose normal       Mouth/Throat:      Mouth: Mucous membranes are moist       Pharynx: Oropharynx is clear  Eyes:      Extraocular Movements: Extraocular movements intact  Conjunctiva/sclera: Conjunctivae normal       Pupils: Pupils are equal, round, and reactive to light  Cardiovascular:      Rate and Rhythm: Normal rate and regular rhythm  Pulses: Normal pulses  Heart sounds: No murmur heard  Pulmonary:      Effort: Pulmonary effort is normal  No respiratory distress  Breath sounds: No wheezing, rhonchi or rales  Abdominal:      General: Bowel sounds are normal       Palpations: Abdomen is soft  There is no mass  Tenderness: There is no abdominal tenderness  Hernia: No hernia is present  Musculoskeletal:         General: No tenderness or deformity  Normal range of motion  Cervical back: Normal range of motion and neck supple  No muscular tenderness  Right lower leg: Edema present  Left lower leg: Edema present  Skin:     General: Skin is warm and dry  Neurological:      General: No focal deficit present  Mental Status: He is alert and oriented to person, place, and time  Mental status is at baseline     Psychiatric: Mood and Affect: Mood normal          Behavior: Behavior normal          Thought Content: Thought content normal          Judgment: Judgment normal          Labs: I have personally reviewed pertinent lab results  , ABG: No results found for: PHART, JSP1WTQ, PO2ART, OUC1HPO, Z5UDWEYH, BEART, SOURCE, BNP: No results found for: BNP, CBC:   Lab Results   Component Value Date    WBC 12 06 (H) 08/13/2021    HGB 9 5 (L) 08/13/2021    HCT 36 1 (L) 08/13/2021    MCV 82 08/13/2021     08/13/2021    MCH 21 7 (L) 08/13/2021    MCHC 26 3 (L) 08/13/2021    RDW 20 2 (H) 08/13/2021    MPV 11 0 08/13/2021    NRBC 0 08/13/2021   , CMP:   Lab Results   Component Value Date    SODIUM 140 08/13/2021    K 3 5 08/13/2021     08/13/2021    CO2 37 (H) 08/13/2021    BUN 35 (H) 08/13/2021    CREATININE 1 58 (H) 08/13/2021    CALCIUM 8 2 (L) 08/13/2021    AST 12 08/13/2021    ALT 13 08/13/2021    ALKPHOS 78 08/13/2021    EGFR 46 08/13/2021   , PT/INR: No results found for: PT, INR, Troponin: No results found for: TROPONINI      Imaging and other studies: I have personally reviewed pertinent films in PACS

## 2021-08-13 NOTE — PROGRESS NOTES
[id number] [facility] [address] [place]  Progress Note - Yanna Chu 1960, 57 y o  male MRN: [id number]  Unit/Bed#:  Encounter: [id number]  Primary Care Provider: Rosana Pond DO   Date and time admitted to hospital: 8/11/2021 10:17 AM    Acute cystitis with hematuria  Assessment & Plan  Day 3 cefepime  Follow-up urine culture    Multifocal pneumonia  Assessment & Plan  Without fever or wbc count , it's unclear if findings are acute or sequale of covid infection this year  Possible aspiration vs  HCAP vs  Gram negative pna  Day 3 vanco/cefepime/Flagyl  Follow-up Pro-calcitonin and trend  Procalcitonin, urine Legionella and pneumococcal antigens negative  Flagyl discontinued due to allergy, allergy added  Continue vancomycin and cefepime, cefepime for UTI    Acute on chronic diastolic (congestive) heart failure (HCC)  Assessment & Plan  Wt Readings from Last 3 Encounters:   08/13/21 (!) 158 kg (348 lb 1 7 oz)   07/14/21 (!) 155 kg (342 lb 3 2 oz)   03/04/21 (!) 153 kg (338 lb 3 oz)     Chronically on bumex 1mg BID ,   Day 2 Bumex 1 mg IV b i d  Renal function slightly worse, hold diuretics    Paroxysmal atrial fibrillation (HCC)  Assessment & Plan  Continue to hold cardizem   Continue metoprolol   Eliquis for [facility]    Dysphagia  Assessment & Plan  History of dysphagia with CT concerning aspiration  Modified diet instituted after speech evaluation    Metabolic encephalopathy  Assessment & Plan  Secondary to hypercapnia versus UTI  He continues to improve, but still has moments of confusion          Progress Note - Yanna Chu 57 y o  male MRN: [id number]    Unit/Bed#:  Encounter: [id number]        Subjective:   Patient seen and examined at bedside  He's more alert today, but has episodes of intermittent confusion    Objective:     Vitals:   Vitals:    08/13/21 0950   BP: 92/51   Pulse: 97   Resp:    Temp:    SpO2:      Body mass index is 48 55 kg/m²      Intake/Output Summary (Last 24 hours) at 8/13/2021 0957  Last data filed at 8/13/2021 0283  Gross per 24 hour   Intake 1964 ml   Output 1075 ml   Net 889 ml       Physical Exam:   BP 92/51   Pulse 97   Temp 98 7 °F (37 1 °C) (Tympanic)   Resp (!) 31   Ht 5' 11" (1 803 m)   Wt (!) 158 kg (348 lb 1 7 oz)   SpO2 93%   BMI 48 55 kg/m²   General appearance: alert  Head: Normocephalic, without obvious abnormality, atraumatic  Lungs: clear to auscultation bilaterally  Heart: regular rate and rhythm, S1, S2 normal, no murmur, click, rub or gallop  Abdomen: soft, non-tender; bowel sounds normal; no masses,  no organomegaly  Extremities: extremities normal, warm and well-perfused; no cyanosis, clubbing, or edema  Pulses: 2+ and symmetric  Neurologic: Grossly normal     Invasive Devices     Peripheral Intravenous Line            Peripheral IV 08/13/21 Right Antecubital <1 day                Results from last 7 days   Lab Units 08/13/21 0558 08/12/21 0444 08/11/21  1515 08/11/21  1034   WBC Thousand/uL 12 06* 5 63  --  7 40   HEMOGLOBIN g/dL 9 5* 9 0*  --  9 1*   I STAT HEMOGLOBIN g/dl  --   --  11 6*  --    HEMATOCRIT % 36 1* 34 8*  --  35 2*   HEMATOCRIT, ISTAT %  --   --  34*  --    PLATELETS Thousands/uL 177 170  --  198       Results from last 7 days   Lab Units 08/13/21 0558 08/12/21 0444 08/11/21  1515 08/11/21  1034   POTASSIUM mmol/L 3 5 3 8  --  4 7   CHLORIDE mmol/L 101 103  --  102   CO2 mmol/L 37* 40*  --  43*   CO2, I-STAT mmol/L  --   --  >45*  --    BUN mg/dL 35* 32*  --  33*   CREATININE mg/dL 1 58* 1 34*  --  1 45*   CALCIUM mg/dL 8 2* 8 8  --  9 1   ALK PHOS U/L 78  --   --  90   ALT U/L 13  --   --  13   AST U/L 12  --   --  9   GLUCOSE, ISTAT mg/dl  --   --  101  --        Medication Administration - last 24 hours from 08/12/2021 0957 to 08/13/2021 0957       Date/Time Order Dose Route Action Action by     08/13/2021 0825 allopurinol (ZYLOPRIM) tablet 100 mg 100 mg Oral Given Gennaro Larios RN     08/13/2021 0825 apixaban (ELIQUIS) tablet 2 5 mg 2 5 mg Oral Given Mellissa Suze, RN     08/12/2021 1828 apixaban (ELIQUIS) tablet 2 5 mg 2 5 mg Oral Given Mellissa Suze, RN     08/13/2021 0825 aspirin (ECOTRIN LOW STRENGTH) EC tablet 81 mg 81 mg Oral Given Mellissa Suze, RN     08/12/2021 1539 atorvastatin (LIPITOR) tablet 40 mg 40 mg Oral Given Mellissa Suze, RN     08/12/2021 1538 b complex-vitamin C-folic acid (NEPHROCAPS) capsule 1 capsule 1 capsule Oral Given Mellissa Suze, RN     08/13/2021 0950 diltiazem (CARDIZEM CD) 24 hr capsule 180 mg 180 mg Oral Not Given Mellissa Suze, RN     08/13/2021 0601 pantoprazole (PROTONIX) EC tablet 40 mg 40 mg Oral Given [facility], RN     08/12/2021 1539 pantoprazole (PROTONIX) EC tablet 40 mg 40 mg Oral Given Mellissa Suze, RN     08/13/2021 0950 metoprolol tartrate (LOPRESSOR) tablet 50 mg 50 mg Oral Not Given Mellissa Snooks, RN     08/12/2021 2200 metoprolol tartrate (LOPRESSOR) tablet 50 mg 50 mg Oral Given Desirae Blenarciso, RN     08/12/2021 1539 metoprolol tartrate (LOPRESSOR) tablet 50 mg 50 mg Oral Given Mellissa ooks, RN     08/13/2021 0825 sertraline (ZOLOFT) tablet 100 mg 100 mg Oral Given Mellissa Suze, RN     08/13/2021 0949 magnesium oxide (MAG-OX) tablet 400 mg 400 mg Oral Given Mellissa Snooks, RN     08/13/2021 0722 vancomycin (VANCOCIN) 1,500 mg in sodium chloride 0 9 % 500 mL IVPB 0 mg Intravenous Stopped Mellissa Suze, RN     08/13/2021 4103 vancomycin (VANCOCIN) 1,500 mg in sodium chloride 0 9 % 500 mL IVPB 1,500 mg Intravenous New Bag [facility], RN     08/12/2021 2216 vancomycin (VANCOCIN) 1,500 mg in sodium chloride 0 9 % 500 mL IVPB   Intravenous Restarted Desirae Elw, RN     08/12/2021 2058 vancomycin (VANCOCIN) 1,500 mg in sodium chloride 0 9 % 500 mL IVPB 0 mg Intravenous Hold Desirae Cabello RN     08/12/2021 2028 vancomycin (VANCOCIN) 1,500 mg in sodium chloride 0 9 % 500 mL IVPB 1,500 mg Intravenous New Aparna Bethea RN 08/13/2021 0254 cefepime (MAXIPIME) IVPB (premix in dextrose) 2,000 mg 50 mL 2,000 mg Intravenous [address] Fernanda Damon, YUDITH     08/12/2021 1421 cefepime (MAXIPIME) IVPB (premix in dextrose) 2,000 mg 50 mL 2,000 mg Intravenous New [address] [facility] [address] Mellissa Arciniega, YUDITH     08/12/2021 1838 bumetanide (BUMEX) injection 1 mg 1 mg Intravenous Not Given Mellissa Arciniega, RN     08/13/2021 0254 metroNIDAZOLE (FLAGYL) IVPB (premix) 500 mg 100 mL 500 mg Intravenous Not Given Fernanda Damon, YUDITH     08/12/2021 2025 metroNIDAZOLE (FLAGYL) IVPB (premix) 500 mg 100 mL 0 mg Intravenous Stopped Desirae Blew, RN     08/12/2021 1829 metroNIDAZOLE (FLAGYL) IVPB (premix) 500 mg 100 mL 500 mg Intravenous [address] Mellissa Arciniega, RN     08/12/2021 1141 metroNIDAZOLE (FLAGYL) IVPB (premix) 500 mg 100 mL 500 mg Intravenous [address] Mellissa Arciniega, RN     08/13/2021 0259 diphenhydrAMINE (BENADRYL) tablet 25 mg 25 mg [facility]     08/12/2021 2007 diphenhydrAMINE (BENADRYL) tablet 25 mg 25 mg Oral Given Desirae Blew, RN     08/12/2021 1852 oxyCODONE (ROXICODONE) IR tablet 5 mg 5 mg Oral Given Mellissa Arciniega, RN     08/12/2021 2045 diphenhydrAMINE (BENADRYL) injection 12 5 mg 12 5 mg Intravenous Given Desirae Blew, RN     08/12/2021 2129 diphenhydrAMINE (BENADRYL) injection 12 5 mg 12 5 mg Intravenous Given Desirae Blew, RN            Lab, Imaging and other studies: I have personally reviewed pertinent reports      VTE Pharmacologic Prophylaxis: eliquis  VTE Mechanical Prophylaxis: sequential compression device     Elijah Cervantes MD  8/13/2021,9:57 AM

## 2021-08-13 NOTE — ASSESSMENT & PLAN NOTE
Without fever or wbc count , it's unclear if findings are acute or sequale of covid infection this year  Possible aspiration vs  HCAP vs  Gram negative pna  Day 3 vanco/cefepime/Flagyl  Follow-up Pro-calcitonin and trend  Procalcitonin, urine Legionella and pneumococcal antigens negative  Flagyl discontinued due to allergy, allergy added  Continue vancomycin and cefepime, cefepime for UTI

## 2021-08-13 NOTE — PROGRESS NOTES
Vanco trough level came back elevated at 37  Instructed nursing to stop the bag the was hanging, and we will draw a random level daily   Redosing to be done when level falls below 20

## 2021-08-13 NOTE — MALNUTRITION/BMI
This medical record reflects one or more clinical indicators suggestive of morbid obesity  Malnutrition Findings:       None       BMI Findings: Body mass index is 48 55 kg/m²  See Nutrition note dated 8/13/21 for additional details  Completed nutrition assessment is viewable in the nutrition documentation  Patient not appropriate for diet education at this time

## 2021-08-13 NOTE — ASSESSMENT & PLAN NOTE
Wt Readings from Last 3 Encounters:   08/13/21 (!) 158 kg (348 lb 1 7 oz)   07/14/21 (!) 155 kg (342 lb 3 2 oz)   03/04/21 (!) 153 kg (338 lb 3 oz)     Chronically on bumex 1mg BID ,   Day 2 Bumex 1 mg IV b i d    Renal function slightly worse, hold diuretics

## 2021-08-13 NOTE — RESPIRATORY THERAPY NOTE
08/13/21 0300   Respiratory Assessment   Assessment Type Assess only   General Appearance Awake;Drowsy   Respiratory Pattern Normal   Chest Assessment Chest expansion symmetrical   Bilateral Breath Sounds Diminished;Clear   Resp Comments pt blanca well was off  per rn for snack now back on    O2 Device bipap    Non-Invasive Information   O2 Interface Device Full face mask   Non-Invasive Ventilation Mode BiPAP  (v60)   $ Intermittent NIV Yes   $ Pulse Oximetry Spot Check Charge Completed   Non-Invasive Settings   IPAP (cm) 16 cm   EPAP (cm) 8 cm   Rate (Set) 8   FiO2 (%) 30   Flow (lpm) 45   Pressure Support (cm H2O) 8   Rise Time 3   Inspiratory Time (Set) 1 5   Non-Invasive Readings   Total Rate 31   Peak Pressure (Obs) 17   Spontaneous Vt (mL) 401   I/E Ratio (Obs) 1:5   Leak (lpm) 2   Skin Intervention Skin intact;Mask rotated   Spontaneous MV (mL) 13   Non-Invasive Alarms   Insp Pressure High (cm H20) 24   Insp Pressure Low (cm H20) 4   Low Insp Pressure Time (sec) 20 sec   MV Low (L/min) 4   Vt High (mL) 1500   Vt Low (mL) 200   High Resp Rate (BPM) 40 BPM   Low Resp Rate (BPM) 6 BPM   Apnea Interval (sec) 20   Apnea Rate 8   Apnea Pressure 16

## 2021-08-13 NOTE — NURSING NOTE
Patient having persistent itching and resdness--Gamal Andrade made aware & ordered Po benadryl  Med explained & given to patient

## 2021-08-13 NOTE — QUICK NOTE
Notified by  RN that patient complained of increased intense itching after starting Flagyl infusion  Apparently patient had similar complaint after dose of of flagyl yesterday and benadryl was administered  Flagyl dose was held and benadryl 12 5 mg IV administered  Patient had some relief of itching after second dose of 12 5 mg IV benadryl         Will continue to hold additional doses of flagyl for now  Will continue with IV vancomycin and IV Cefepime for now  Continue with PRN benadryl  Continue to monitor closely

## 2021-08-14 ENCOUNTER — APPOINTMENT (INPATIENT)
Dept: RADIOLOGY | Facility: HOSPITAL | Age: 61
DRG: 871 | End: 2021-08-14
Payer: MEDICARE

## 2021-08-14 LAB
ALBUMIN SERPL BCP-MCNC: 2.8 G/DL (ref 3.5–5)
ALP SERPL-CCNC: 81 U/L (ref 46–116)
ALT SERPL W P-5'-P-CCNC: 14 U/L (ref 12–78)
ANION GAP SERPL CALCULATED.3IONS-SCNC: 4 MMOL/L (ref 4–13)
ANION GAP SERPL CALCULATED.3IONS-SCNC: 5 MMOL/L (ref 4–13)
AST SERPL W P-5'-P-CCNC: 12 U/L (ref 5–45)
BACTERIA UR CULT: NORMAL
BASOPHILS # BLD AUTO: 0.02 THOUSANDS/ΜL (ref 0–0.1)
BASOPHILS NFR BLD AUTO: 0 % (ref 0–1)
BILIRUB SERPL-MCNC: 0.55 MG/DL (ref 0.2–1)
BUN SERPL-MCNC: 34 MG/DL (ref 5–25)
BUN SERPL-MCNC: 39 MG/DL (ref 5–25)
CALCIUM ALBUM COR SERPL-MCNC: 9.4 MG/DL (ref 8.3–10.1)
CALCIUM SERPL-MCNC: 8 MG/DL (ref 8.3–10.1)
CALCIUM SERPL-MCNC: 8.4 MG/DL (ref 8.3–10.1)
CHLORIDE SERPL-SCNC: 100 MMOL/L (ref 100–108)
CHLORIDE SERPL-SCNC: 99 MMOL/L (ref 100–108)
CO2 SERPL-SCNC: 34 MMOL/L (ref 21–32)
CO2 SERPL-SCNC: 35 MMOL/L (ref 21–32)
CREAT SERPL-MCNC: 1.55 MG/DL (ref 0.6–1.3)
CREAT SERPL-MCNC: 1.71 MG/DL (ref 0.6–1.3)
EOSINOPHIL # BLD AUTO: 1.51 THOUSAND/ΜL (ref 0–0.61)
EOSINOPHIL NFR BLD AUTO: 10 % (ref 0–6)
ERYTHROCYTE [DISTWIDTH] IN BLOOD BY AUTOMATED COUNT: 20.6 % (ref 11.6–15.1)
GFR SERPL CREATININE-BSD FRML MDRD: 42 ML/MIN/1.73SQ M
GFR SERPL CREATININE-BSD FRML MDRD: 47 ML/MIN/1.73SQ M
GLUCOSE SERPL-MCNC: 162 MG/DL (ref 65–140)
GLUCOSE SERPL-MCNC: 94 MG/DL (ref 65–140)
HCT VFR BLD AUTO: 38.2 % (ref 36.5–49.3)
HGB BLD-MCNC: 10.4 G/DL (ref 12–17)
IMM GRANULOCYTES # BLD AUTO: 0.15 THOUSAND/UL (ref 0–0.2)
IMM GRANULOCYTES NFR BLD AUTO: 1 % (ref 0–2)
LACTATE SERPL-SCNC: 0.8 MMOL/L (ref 0.5–2)
LYMPHOCYTES # BLD AUTO: 0.22 THOUSANDS/ΜL (ref 0.6–4.47)
LYMPHOCYTES NFR BLD AUTO: 1 % (ref 14–44)
MCH RBC QN AUTO: 22.3 PG (ref 26.8–34.3)
MCHC RBC AUTO-ENTMCNC: 27.2 G/DL (ref 31.4–37.4)
MCV RBC AUTO: 82 FL (ref 82–98)
MONOCYTES # BLD AUTO: 0.8 THOUSAND/ΜL (ref 0.17–1.22)
MONOCYTES NFR BLD AUTO: 5 % (ref 4–12)
NEUTROPHILS # BLD AUTO: 13.06 THOUSANDS/ΜL (ref 1.85–7.62)
NEUTS SEG NFR BLD AUTO: 83 % (ref 43–75)
NRBC BLD AUTO-RTO: 0 /100 WBCS
PLATELET # BLD AUTO: 203 THOUSANDS/UL (ref 149–390)
POTASSIUM SERPL-SCNC: 2.8 MMOL/L (ref 3.5–5.3)
POTASSIUM SERPL-SCNC: 3.5 MMOL/L (ref 3.5–5.3)
PROT SERPL-MCNC: 6.7 G/DL (ref 6.4–8.2)
RBC # BLD AUTO: 4.67 MILLION/UL (ref 3.88–5.62)
SODIUM SERPL-SCNC: 138 MMOL/L (ref 136–145)
SODIUM SERPL-SCNC: 139 MMOL/L (ref 136–145)
VANCOMYCIN SERPL-MCNC: 26.5 UG/ML
WBC # BLD AUTO: 15.76 THOUSAND/UL (ref 4.31–10.16)

## 2021-08-14 PROCEDURE — 80202 ASSAY OF VANCOMYCIN: CPT | Performed by: FAMILY MEDICINE

## 2021-08-14 PROCEDURE — 85025 COMPLETE CBC W/AUTO DIFF WBC: CPT | Performed by: FAMILY MEDICINE

## 2021-08-14 PROCEDURE — 83605 ASSAY OF LACTIC ACID: CPT | Performed by: FAMILY MEDICINE

## 2021-08-14 PROCEDURE — 99291 CRITICAL CARE FIRST HOUR: CPT | Performed by: FAMILY MEDICINE

## 2021-08-14 PROCEDURE — 80048 BASIC METABOLIC PNL TOTAL CA: CPT | Performed by: FAMILY MEDICINE

## 2021-08-14 PROCEDURE — 71045 X-RAY EXAM CHEST 1 VIEW: CPT

## 2021-08-14 PROCEDURE — 36556 INSERT NON-TUNNEL CV CATH: CPT | Performed by: SPECIALIST

## 2021-08-14 PROCEDURE — 87040 BLOOD CULTURE FOR BACTERIA: CPT | Performed by: FAMILY MEDICINE

## 2021-08-14 PROCEDURE — 94660 CPAP INITIATION&MGMT: CPT

## 2021-08-14 PROCEDURE — 93005 ELECTROCARDIOGRAM TRACING: CPT

## 2021-08-14 PROCEDURE — 94760 N-INVAS EAR/PLS OXIMETRY 1: CPT

## 2021-08-14 PROCEDURE — 80053 COMPREHEN METABOLIC PANEL: CPT | Performed by: FAMILY MEDICINE

## 2021-08-14 PROCEDURE — 99222 1ST HOSP IP/OBS MODERATE 55: CPT | Performed by: SPECIALIST

## 2021-08-14 PROCEDURE — 02HV33Z INSERTION OF INFUSION DEVICE INTO SUPERIOR VENA CAVA, PERCUTANEOUS APPROACH: ICD-10-PCS | Performed by: SPECIALIST

## 2021-08-14 PROCEDURE — NC001 PR NO CHARGE: Performed by: SPECIALIST

## 2021-08-14 RX ORDER — ALBUMIN (HUMAN) 12.5 G/50ML
25 SOLUTION INTRAVENOUS EVERY 6 HOURS
Status: COMPLETED | OUTPATIENT
Start: 2021-08-14 | End: 2021-08-14

## 2021-08-14 RX ORDER — POTASSIUM CHLORIDE 14.9 MG/ML
20 INJECTION INTRAVENOUS ONCE
Status: COMPLETED | OUTPATIENT
Start: 2021-08-14 | End: 2021-08-14

## 2021-08-14 RX ORDER — LIDOCAINE HYDROCHLORIDE 10 MG/ML
INJECTION, SOLUTION EPIDURAL; INFILTRATION; INTRACAUDAL; PERINEURAL
Status: COMPLETED
Start: 2021-08-14 | End: 2021-08-14

## 2021-08-14 RX ORDER — METOPROLOL TARTRATE 5 MG/5ML
5 INJECTION INTRAVENOUS ONCE
Status: COMPLETED | OUTPATIENT
Start: 2021-08-14 | End: 2021-08-14

## 2021-08-14 RX ORDER — ALBUMIN (HUMAN) 12.5 G/50ML
25 SOLUTION INTRAVENOUS ONCE
Status: COMPLETED | OUTPATIENT
Start: 2021-08-14 | End: 2021-08-14

## 2021-08-14 RX ORDER — POTASSIUM CHLORIDE 20 MEQ/1
40 TABLET, EXTENDED RELEASE ORAL 2 TIMES DAILY
Status: COMPLETED | OUTPATIENT
Start: 2021-08-14 | End: 2021-08-14

## 2021-08-14 RX ADMIN — POTASSIUM CHLORIDE 40 MEQ: 1500 TABLET, EXTENDED RELEASE ORAL at 17:41

## 2021-08-14 RX ADMIN — PANTOPRAZOLE SODIUM 40 MG: 40 TABLET, DELAYED RELEASE ORAL at 06:18

## 2021-08-14 RX ADMIN — SODIUM CHLORIDE 500 ML: 0.9 INJECTION, SOLUTION INTRAVENOUS at 09:00

## 2021-08-14 RX ADMIN — CEFEPIME HYDROCHLORIDE 2000 MG: 2 INJECTION, SOLUTION INTRAVENOUS at 01:32

## 2021-08-14 RX ADMIN — SERTRALINE HYDROCHLORIDE 100 MG: 100 TABLET ORAL at 11:41

## 2021-08-14 RX ADMIN — ATORVASTATIN CALCIUM 40 MG: 40 TABLET, FILM COATED ORAL at 17:41

## 2021-08-14 RX ADMIN — PANTOPRAZOLE SODIUM 40 MG: 40 TABLET, DELAYED RELEASE ORAL at 17:46

## 2021-08-14 RX ADMIN — ALBUMIN (HUMAN) 25 G: 12.5 SOLUTION INTRAVENOUS at 12:15

## 2021-08-14 RX ADMIN — POTASSIUM CHLORIDE 40 MEQ: 1500 TABLET, EXTENDED RELEASE ORAL at 11:43

## 2021-08-14 RX ADMIN — ALLOPURINOL 100 MG: 100 TABLET ORAL at 11:44

## 2021-08-14 RX ADMIN — DIPHENHYDRAMINE HYDROCHLORIDE 25 MG: 25 TABLET ORAL at 05:43

## 2021-08-14 RX ADMIN — ALBUMIN (HUMAN) 25 G: 12.5 SOLUTION INTRAVENOUS at 17:53

## 2021-08-14 RX ADMIN — ACETAMINOPHEN 650 MG: 325 TABLET, FILM COATED ORAL at 13:00

## 2021-08-14 RX ADMIN — ACETAMINOPHEN 650 MG: 325 TABLET, FILM COATED ORAL at 06:38

## 2021-08-14 RX ADMIN — METOPROLOL TARTRATE 50 MG: 50 TABLET, FILM COATED ORAL at 06:53

## 2021-08-14 RX ADMIN — APIXABAN 2.5 MG: 2.5 TABLET, FILM COATED ORAL at 17:41

## 2021-08-14 RX ADMIN — METOROPROLOL TARTRATE 5 MG: 5 INJECTION, SOLUTION INTRAVENOUS at 06:50

## 2021-08-14 RX ADMIN — HYDROCORTISONE SODIUM SUCCINATE 50 MG: 100 INJECTION, POWDER, FOR SOLUTION INTRAMUSCULAR; INTRAVENOUS at 09:36

## 2021-08-14 RX ADMIN — POTASSIUM CHLORIDE 20 MEQ: 14.9 INJECTION, SOLUTION INTRAVENOUS at 17:00

## 2021-08-14 RX ADMIN — HYDROCORTISONE SODIUM SUCCINATE 50 MG: 100 INJECTION, POWDER, FOR SOLUTION INTRAMUSCULAR; INTRAVENOUS at 14:38

## 2021-08-14 RX ADMIN — HYDROCORTISONE SODIUM SUCCINATE 50 MG: 100 INJECTION, POWDER, FOR SOLUTION INTRAMUSCULAR; INTRAVENOUS at 21:05

## 2021-08-14 RX ADMIN — ALBUMIN (HUMAN) 25 G: 12.5 SOLUTION INTRAVENOUS at 09:50

## 2021-08-14 RX ADMIN — Medication 1 CAPSULE: at 17:41

## 2021-08-14 RX ADMIN — POTASSIUM CHLORIDE 20 MEQ: 14.9 INJECTION, SOLUTION INTRAVENOUS at 06:36

## 2021-08-14 RX ADMIN — MAGNESIUM OXIDE TAB 400 MG (241.3 MG ELEMENTAL MG) 400 MG: 400 (241.3 MG) TAB at 11:45

## 2021-08-14 RX ADMIN — CEFEPIME HYDROCHLORIDE 2000 MG: 2 INJECTION, SOLUTION INTRAVENOUS at 14:35

## 2021-08-14 RX ADMIN — PHENYLEPHRINE HYDROCHLORIDE 5 MCG/MIN: 10 INJECTION INTRAVENOUS at 17:00

## 2021-08-14 RX ADMIN — SODIUM CHLORIDE 250 ML: 0.9 INJECTION, SOLUTION INTRAVENOUS at 06:49

## 2021-08-14 RX ADMIN — ACETAMINOPHEN 650 MG: 325 TABLET, FILM COATED ORAL at 21:13

## 2021-08-14 RX ADMIN — LIDOCAINE HYDROCHLORIDE 300 MG: 10 INJECTION, SOLUTION EPIDURAL; INFILTRATION; INTRACAUDAL; PERINEURAL at 17:03

## 2021-08-14 RX ADMIN — DIPHENHYDRAMINE HYDROCHLORIDE 25 MG: 25 TABLET ORAL at 22:31

## 2021-08-14 RX ADMIN — METOPROLOL TARTRATE 50 MG: 50 TABLET, FILM COATED ORAL at 21:13

## 2021-08-14 NOTE — RESPIRATORY THERAPY NOTE
08/14/21 0825   Respiratory Assessment   Assessment Type Assess only   General Appearance Awake   Respiratory Pattern Normal   Chest Assessment Chest expansion symmetrical   Bilateral Breath Sounds Diminished   O2 Device 3L NC   Additional Assessments   Pulse 96   Respirations 20   SpO2 93 %

## 2021-08-14 NOTE — PLAN OF CARE
Problem: Potential for Falls  Goal: Patient will remain free of falls  Description: INTERVENTIONS:  - Educate patient/family on patient safety including physical limitations  - Instruct patient to call for assistance with activity   - Consult OT/PT to assist with strengthening/mobility   - Keep Call bell within reach  - Keep bed low and locked with side rails adjusted as appropriate  - Keep care items and personal belongings within reach  - Initiate and maintain comfort rounds  - Make Fall Risk Sign visible to staff  - Apply yellow socks and bracelet for high fall risk patients  - Consider moving patient to room near nurses station  Outcome: Progressing     Problem: MOBILITY - ADULT  Goal: Maintain or return to baseline ADL function  Description: INTERVENTIONS:  -  Assess patient's ability to carry out ADLs; assess patient's baseline for ADL function and identify physical deficits which impact ability to perform ADLs (bathing, care of mouth/teeth, toileting, grooming, dressing, etc )  - Assess/evaluate cause of self-care deficits   - Assess range of motion  - Assess patient's mobility; develop plan if impaired  - Assess patient's need for assistive devices and provide as appropriate  - Encourage maximum independence but intervene and supervise when necessary  - Involve family in performance of ADLs  - Assess for home care needs following discharge   - Consider OT consult to assist with ADL evaluation and planning for discharge  - Provide patient education as appropriate  Outcome: Progressing  Goal: Maintains/Returns to pre admission functional level  Description: INTERVENTIONS:  - Perform BMAT or MOVE assessment daily    - Set and communicate daily mobility goal to care team and patient/family/caregiver     - Collaborate with rehabilitation services on mobility goals if consulted  - Out of bed for toileting  - Record patient progress and toleration of activity level   Outcome: Progressing     Problem: PAIN - ADULT  Goal: Verbalizes/displays adequate comfort level or baseline comfort level  Description: Interventions:  - Encourage patient to monitor pain and request assistance  - Assess pain using appropriate pain scale  - Administer analgesics based on type and severity of pain and evaluate response  - Implement non-pharmacological measures as appropriate and evaluate response  - Consider cultural and social influences on pain and pain management  - Notify physician/advanced practitioner if interventions unsuccessful or patient reports new pain  Outcome: Progressing     Problem: INFECTION - ADULT  Goal: Absence or prevention of progression during hospitalization  Description: INTERVENTIONS:  - Assess and monitor for signs and symptoms of infection  - Monitor lab/diagnostic results  - Monitor all insertion sites, i e  indwelling lines, tubes, and drains  - Administer medications as ordered  - Instruct and encourage patient and family to use good hand hygiene technique  - Identify and instruct in appropriate isolation precautions for identified infection/condition  Outcome: Progressing     Problem: SAFETY ADULT  Goal: Patient will remain free of falls  Description: INTERVENTIONS:  - Educate patient/family on patient safety including physical limitations  - Instruct patient to call for assistance with activity   - Consult OT/PT to assist with strengthening/mobility   - Keep Call bell within reach  - Keep bed low and locked with side rails adjusted as appropriate  - Keep care items and personal belongings within reach  - Initiate and maintain comfort rounds  - Make Fall Risk Sign visible to staff  - Apply yellow socks and bracelet for high fall risk patients  - Consider moving patient to room near nurses station  Outcome: Progressing  Goal: Maintain or return to baseline ADL function  Description: INTERVENTIONS:  -  Assess patient's ability to carry out ADLs; assess patient's baseline for ADL function and identify physical deficits which impact ability to perform ADLs (bathing, care of mouth/teeth, toileting, grooming, dressing, etc )  - Assess/evaluate cause of self-care deficits   - Assess range of motion  - Assess patient's mobility; develop plan if impaired  - Assess patient's need for assistive devices and provide as appropriate  - Encourage maximum independence but intervene and supervise when necessary  - Involve family in performance of ADLs  - Assess for home care needs following discharge   - Consider OT consult to assist with ADL evaluation and planning for discharge  - Provide patient education as appropriate  Outcome: Progressing  Goal: Maintains/Returns to pre admission functional level  Description: INTERVENTIONS:  - Perform BMAT or MOVE assessment daily    - Set and communicate daily mobility goal to care team and patient/family/caregiver  - Collaborate with rehabilitation services on mobility goals if consulted  - Out of bed for toileting  - Record patient progress and toleration of activity level   Outcome: Progressing     Problem: DISCHARGE PLANNING  Goal: Discharge to home or other facility with appropriate resources  Description: INTERVENTIONS:  - Identify barriers to discharge w/patient and caregiver  - Arrange for needed discharge resources and transportation as appropriate  - Identify discharge learning needs (meds, wound care, etc )  - Refer to Case Management Department for coordinating discharge planning if the patient needs post-hospital services based on physician/advanced practitioner order or complex needs related to functional status, cognitive ability, or social support system  Outcome: Progressing     Problem: Knowledge Deficit  Goal: Patient/family/caregiver demonstrates understanding of disease process, treatment plan, medications, and discharge instructions  Description: Complete learning assessment and assess knowledge base    Interventions:  - Provide teaching at level of understanding  - Provide teaching via preferred learning methods  Outcome: Progressing     Problem: CARDIOVASCULAR - ADULT  Goal: Maintains optimal cardiac output and hemodynamic stability  Description: INTERVENTIONS:  - Monitor I/O, vital signs and rhythm  - Monitor for S/S and trends of decreased cardiac output  - Administer and titrate ordered vasoactive medications to optimize hemodynamic stability  - Assess quality of pulses, skin color and temperature  - Assess for signs of decreased coronary artery perfusion  - Instruct patient to report change in severity of symptoms  Outcome: Progressing  Goal: Absence of cardiac dysrhythmias or at baseline rhythm  Description: INTERVENTIONS:  - Continuous cardiac monitoring, vital signs, obtain 12 lead EKG if ordered  - Administer antiarrhythmic and heart rate control medications as ordered  - Monitor electrolytes and administer replacement therapy as ordered  Outcome: Progressing     Problem: RESPIRATORY - ADULT  Goal: Achieves optimal ventilation and oxygenation  Description: INTERVENTIONS:  - Assess for changes in respiratory status  - Assess for changes in mentation and behavior  - Position to facilitate oxygenation and minimize respiratory effort  - Oxygen administered by appropriate delivery if ordered  - Initiate smoking cessation education as indicated  - Encourage broncho-pulmonary hygiene including cough, deep breathe, Incentive Spirometry  - Assess the need for suctioning and aspirate as needed  - Assess and instruct to report SOB or any respiratory difficulty  - Respiratory Therapy support as indicated  Outcome: Progressing     Problem: SKIN/TISSUE INTEGRITY - ADULT  Goal: Skin Integrity remains intact(Skin Breakdown Prevention)  Description: Assess:  -Perform Lee assessment every 8 hrs  -Clean and moisturize skin every 2 hrs  -Inspect skin when repositioning, toileting, and assisting with ADLS  -Assess under medical devices such as oxygen tubing every shift  -Assess extremities for adequate circulation and sensation     Bed Management:  -Have minimal linens on bed & keep smooth, unwrinkled  -Change linens as needed when moist or perspiring  -Avoid sitting or lying in one position for more than 2 hours while in bed  -Keep HOB at 30-45 degrees     Toileting:  -Offer bedside commode  -Assess for incontinence every 2 hrs    Activity:  -Mobilize patient 4 times a day  -Encourage activity and walks on unit  -Encourage or provide ROM exercises   -Turn and reposition patient every 2 Hours  -Use appropriate equipment to lift or move patient in bed    Skin Care:  -Avoid use of baby powder, tape, friction and shearing, hot water or constrictive clothing  -Relieve pressure over bony prominences using allevyn  -Do not massage red bony areas      Outcome: Progressing  Goal: Pressure injury heals and does not worsen  Description: Interventions:  - Implement low air loss mattress or specialty surface (Criteria met)  - Apply silicone foam dressing  - Instruct/assist with weight shifting every 2 minutes when in chair   - Apply fecal or urinary incontinence containment device   - Perform passive or active ROM every 8 hrs  - Turn and reposition patient & offload bony prominences every 2 hours   - Utilize friction reducing device or surface for transfers   - Consider nutrition services referral as needed  Outcome: Progressing     Problem: MUSCULOSKELETAL - ADULT  Goal: Maintain or return mobility to safest level of function  Description: INTERVENTIONS:  - Assess patient's ability to carry out ADLs; assess patient's baseline for ADL function and identify physical deficits which impact ability to perform ADLs (bathing, care of mouth/teeth, toileting, grooming, dressing, etc )  - Assess/evaluate cause of self-care deficits   - Assess range of motion  - Assess patient's mobility  - Assess patient's need for assistive devices and provide as appropriate  - Encourage maximum independence but intervene and supervise when necessary  - Involve family in performance of ADLs  - Assess for home care needs following discharge   - Consider OT consult to assist with ADL evaluation and planning for discharge  - Provide patient education as appropriate  Outcome: Progressing     Problem: Prexisting or High Potential for Compromised Skin Integrity  Goal: Skin integrity is maintained or improved  Description: INTERVENTIONS:  - Identify patients at risk for skin breakdown  - Assess and monitor skin integrity  - Assess and monitor nutrition and hydration status  - Monitor labs   - Assess for incontinence   - Turn and reposition patient  - Assist with mobility/ambulation  - Relieve pressure over bony prominences  - Avoid friction and shearing  - Provide appropriate hygiene as needed including keeping skin clean and dry  - Evaluate need for skin moisturizer/barrier cream  - Collaborate with interdisciplinary team   - Patient/family teaching  - Consider wound care consult   Outcome: Progressing     Problem: Nutrition/Hydration-ADULT  Goal: Nutrient/Hydration intake appropriate for improving, restoring or maintaining nutritional needs  Description: Monitor and assess patient's nutrition/hydration status for malnutrition  Collaborate with interdisciplinary team and initiate plan and interventions as ordered  Monitor patient's weight and dietary intake as ordered or per policy  Utilize nutrition screening tool and intervene as necessary  Determine patient's food preferences and provide high-protein, high-caloric foods as appropriate       INTERVENTIONS:  - Monitor oral intake, urinary output, labs, and treatment plans  - Assess nutrition and hydration status and recommend course of action  - Evaluate amount of meals eaten  - Assist patient with eating if necessary   - Allow adequate time for meals  - Recommend/ encourage appropriate diets, oral nutritional supplements, and vitamin/mineral supplements  - Assess need for intravenous fluids  - Provide specific nutrition/hydration education as appropriate  - Include patient/family/caregiver in decisions related to nutrition  Outcome: Progressing

## 2021-08-14 NOTE — ASSESSMENT & PLAN NOTE
Wt Readings from Last 3 Encounters:   08/14/21 (!) 157 kg (345 lb 10 9 oz)   07/14/21 (!) 155 kg (342 lb 3 2 oz)   03/04/21 (!) 153 kg (338 lb 3 oz)     Chronically on bumex 1mg BID ,   Day 2 Bumex 1 mg IV b i d    Renal function slightly worse, hold diuretics 8/13/21  Renal function slightly improved continue to hold diuretics and avoid nephrotixns and improve hypotension

## 2021-08-14 NOTE — PROGRESS NOTES
[address] [facility] [address]  Progress Note Cherri Avila 1960, 57 y o  male MRN: [id number]  Unit/Bed#:  Encounter: [id number]  Primary Care Provider: Chinedu Kulkarni DO   Date and time admitted to hospital: 8/11/2021 10:17 AM    Septic shock [facility])  Assessment & Plan  Patient with worsening wbc count, new fever and hypotension, reviewed with Critical care at [facility]  Per review of chart his BP is usually on the lower side  So we elected to trial albumin and solucrtef initally and assess response  If MAP < 65 will start dominick  Lactic normal  Blood cultures obtained x 2 today   procal negative, continue to trend  Will consider PAN CT if wbc worsens   Surgical consult for Central line   Continue vanco/cefepime for now    Multifocal pneumonia  Assessment & Plan  Without fever or wbc count , it's unclear if findings are acute or sequale of covid infection this year  Possible aspiration vs  HCAP vs  Gram negative pna  Day 4 vanco/cefepime  procal negative but wbc worsening with new febrile episodes 8/14/21  Procalcitonin, urine Legionella and pneumococcal antigens negative  Flagyl discontinued due to allergy, allergy added  Continue vancomycin and cefepime, cefepime for UTI    Acute cystitis with hematuria  Assessment & Plan  Day 4 cefepime  Follow-up urine culture    Acute on chronic diastolic (congestive) heart failure (HCC)  Assessment & Plan  Wt Readings from Last 3 Encounters:   08/14/21 (!) 157 kg (345 lb 10 9 oz)   07/14/21 (!) 155 kg (342 lb 3 2 oz)   03/04/21 (!) 153 kg (338 lb 3 oz)     Chronically on bumex 1mg BID ,   Day 2 Bumex 1 mg IV b i d    Renal function slightly worse, hold diuretics 8/13/21  Renal function slightly improved continue to hold diuretics and avoid nephrotixns and improve hypotension    Paroxysmal atrial fibrillation (HCC)  Assessment & Plan  Continue to hold cardizem   Continue metoprolol   [facility]    Metabolic encephalopathy  Assessment & Plan  Secondary to hypercapnia versus UTI  He continues to improve, but still has moments of confusion          Progress Note - Ady Rodriguez 57 y o  male MRN: [id number]    Unit/Bed#:  Encounter: [id number]        Subjective:   Patient seen and examined at bedside, he had an episode of SVT this morning that resolved with resumption of oral BB  He then dumped his BP but was totally asymptomatic , warm and mentating clearly  Objective:     Vitals:   Vitals:    08/14/21 1015   BP: (!) 80/45   Pulse: 92   Resp: (!) 33   Temp:    SpO2: 90%     Body mass index is 48 21 kg/m²  Intake/Output Summary (Last 24 hours) at 8/14/2021 1045  Last data filed at 8/14/2021 0950  Gross per 24 hour   Intake 1370 ml   Output 580 ml   Net 790 ml       Physical Exam:   BP (!) 80/45   Pulse 92   Temp 100 1 °F (37 8 °C) (Temporal)   Resp (!) 33   Ht 5' 11" (1 803 m)   Wt (!) 157 kg (345 lb 10 9 oz)   SpO2 90%   BMI 48 21 kg/m²      General appearance: alert and oriented, in no acute distress  Head: Normocephalic, without obvious abnormality, atraumatic  Lungs: clear to auscultation bilaterally  Heart: regular rate and rhythm, S1, S2 normal, no murmur, click, rub or gallop  Abdomen: soft, non-tender; bowel sounds normal; no masses,  no organomegaly  Extremities: extremities normal, warm and well-perfused; no cyanosis, clubbing, or edema  Pulses: 2+ and symmetric  Skin: Skin color, texture, turgor normal  No rashes or lesions  Neurologic: Grossly normal     Invasive Devices     Peripheral Intravenous Line            Peripheral IV 08/13/21 Right Antecubital 1 day    Peripheral IV 08/14/21 Dorsal (posterior); Left Hand <1 day                Results from last 7 days   Lab Units 08/14/21  0431 08/13/21  0558 08/12/21  0444   WBC Thousand/uL 15 76* 12 06* 5 63   HEMOGLOBIN g/dL 10 4* 9 5* 9 0*   HEMATOCRIT % 38 2 36 1* 34 8*   PLATELETS Thousands/uL 203 177 170       Results from last 7 days   Lab Units 08/14/21  0431 08/13/21  6110 08/12/21  0444 08/11/21  1515 08/11/21  1034   POTASSIUM mmol/L 2 8* 3 5 3 8  --  4 7   CHLORIDE mmol/L 99* 101 103  --  102   CO2 mmol/L 35* 37* 40*  --  43*   CO2, I-STAT mmol/L  --   --   --  >45*  --    BUN mg/dL 34* 35* 32*  --  33*   CREATININE mg/dL 1 55* 1 58* 1 34*  --  1 45*   CALCIUM mg/dL 8 4 8 2* 8 8  --  9 1   ALK PHOS U/L 81 78  --   --  90   ALT U/L 14 13  --   --  13   AST U/L 12 12  --   --  9   GLUCOSE, ISTAT mg/dl  --   --   --  101  --        Medication Administration - last 24 hours from 08/13/2021 1045 to 08/14/2021 1045       Date/Time Order Dose Route Action Action by     08/14/2021 1031 apixaban (ELIQUIS) tablet 2 5 mg 2 5 mg Oral Not Given Todd Martines RN     08/13/2021 1841 apixaban (ELIQUIS) tablet 2 5 mg 2 5 mg Oral Given Marcio Levy RN     08/14/2021 1032 aspirin (ECOTRIN LOW STRENGTH) EC tablet 81 mg 81 mg Oral Not Given Todd Martines RN     08/13/2021 1555 atorvastatin (LIPITOR) tablet 40 mg 40 mg Oral Given Marcio Levy RN     08/13/2021 1555 b complex-vitamin C-folic acid (NEPHROCAPS) capsule 1 capsule 1 capsule Oral Given Marcio Levy RN     08/14/2021 0916 diltiazem (CARDIZEM CD) 24 hr capsule 180 mg 180 mg Oral Not Given Todd Martines RN     08/14/2021 0618 pantoprazole (PROTONIX) EC tablet 40 mg 40 mg Oral Given Wilman Brock RN     08/13/2021 1530 pantoprazole (PROTONIX) EC tablet 40 mg 40 mg Oral Given Marcio Levy RN     08/14/2021 0842 metoprolol tartrate (LOPRESSOR) tablet 50 mg 50 mg Oral Not Given Todd Martines RN     08/14/2021 0653 metoprolol tartrate (LOPRESSOR) tablet 50 mg 50 mg Oral Given Wilman Brock RN     08/13/2021 2202 metoprolol tartrate (LOPRESSOR) tablet 50 mg 50 mg Oral Not Given Wilman Brock RN     08/13/2021 1529 metoprolol tartrate (LOPRESSOR) tablet 50 mg 50 mg Oral Not Given Marcio Levy RN     08/14/2021 0213 cefepime (MAXIPIME) IVPB (premix in dextrose) 2,000 mg 50 mL 0 mg Intravenous Stopped Yuri Chu, YUDITH     08/14/2021 0132 cefepime (MAXIPIME) IVPB (premix in dextrose) 2,000 mg 50 mL 2,000 mg Intravenous [address] Yuri Chu, YUDITH     08/13/2021 1420 cefepime (MAXIPIME) IVPB (premix in dextrose) 2,000 mg 50 mL 2,000 mg Intravenous New Bag Ainsley Jon, YUDITH     08/14/2021 0543 diphenhydrAMINE (BENADRYL) tablet 25 mg 25 mg Oral Given Yuri Chu, YUDITH     08/13/2021 2159 diphenhydrAMINE (BENADRYL) tablet 25 mg 25 mg Oral Given Yuri Chu, RN     08/13/2021 1529 diphenhydrAMINE (BENADRYL) tablet 25 mg 25 mg [facility]     08/14/2021 1632 acetaminophen (TYLENOL) tablet 650 mg 650 mg Oral Given Yuri Chu, YUDITH     08/13/2021 1908 acetaminophen (TYLENOL) tablet 650 mg 650 mg Oral Given Yuri Chu, RN     08/14/2021 0636 potassium chloride 20 mEq IVPB (premix) 20 mEq Intravenous 2600 Anders Morrison, RN     08/14/2021 0900 sodium chloride 0 9 % bolus 250 mL 0 mL Intravenous Stopped [facility], RN     08/14/2021 0649 sodium chloride 0 9 % bolus 250 mL 250 mL Intravenous [address] [facility] Teresita Lim, RN     08/14/2021 0650 metoprolol (LOPRESSOR) injection 5 mg 5 mg Intravenous Given Yuri Chu RN     08/14/2021 0950 sodium chloride 0 9 % bolus 500 mL 0 mL Intravenous Stopped [facility], RN     08/14/2021 0900 sodium chloride 0 9 % bolus 500 mL 500 mL Intravenous [address] [facility]     08/14/2021 6530 hydrocortisone sodium succinate (PF) (Solu-CORTEF) injection 50 mg 50 mg Intravenous Given Halie Hood, RN     08/14/2021 1028 albumin human (FLEXBUMIN) 25 % injection 25 g 0 g Intravenous Stopped [facility], RN     08/14/2021 0950 albumin human (FLEXBUMIN) 25 % injection 25 g 25 g Intravenous Jorge Hood RN            Lab, Imaging and other studies: I have personally reviewed pertinent reports      VTE Pharmacologic Prophylaxis: eliquis  VTE Mechanical Prophylaxis: sequential compression device Zee López MD  8/19/5087,76:36 AM

## 2021-08-14 NOTE — CONSULTS
Consultation - General Surgery   Francisco Pace 57 y o  male MRN: [id number]  Unit/Bed#:  Encounter: [id number]    Assessment/Plan     Assessment:  64yo M presenting to hospital with acute on chronic hypoxic and hypercapnic respiratory failure in the setting of multifocal pneumonia  - Patient has been improving clinically since time of admission  Since admission Pressures have been low averaging 90s-100s / 50s  - this AM patients BP began to drop, lowest recorded at 0905 at 53/23  Albumin and solu-cortef were administered and since this time pressures have gradually increased  General surgery was consulted for central line placement due to septic shock and need for pressors     - Last set of vitals: pulse 92, Resp 33, BP 80/45, SpO2 90% on 2L NC  - Lactic acid normal 0 8 this AM   - CBC 15 76 (12 06, 5 63)  - Patient is awake, alert, and oriented, warm with +2 palpable pulses on examination and states he feels well  Acute on Chronic CHF  Afib  Metabolic Encephalopathy     Plan:  - Central line placement once patient is seen and evaluated by Dr Adia Medina, on call general surgeon  - ASA and Eliquis held this AM,  Last dose 8/13 At 1800  - monitor vitals closely   - continue care per primary team     History of Present Illness   HPI:  Francisco Pace is a 57 y o  male  with multiple comorbidities including obesity, CKD,  Afib,  Chronic anemia,  CHF who is currently admitted at [facility]  for acute on chronic hypoxic and hypercapnic respiratory failure in the setting of multifocal pneumonia  Since admission patient has had low pressures averaging 90-100s / 50s  This morning his pressure dropped to 53/23, he was given albumin and solu-cortef and BP has been gradually increasing since this time  General surgery was consulted for placement of central line due to septic shock and potential need for pressors  While speaking to patient he is  awake, alert, oriented and has no complaints     He states he feels well  Tolerating diet well  Denies fever/chills, lightheadedness/dizziness  Inpatient consult to Acute Care Surgery  Consult performed by: Radha White PA-C  Consult ordered by: Kellee Su MD        Review of Systems   Constitutional: Negative for appetite change, chills, diaphoresis and fever  HENT: Negative  Eyes: Negative  Respiratory: Negative for cough, chest tightness, shortness of breath, wheezing and stridor  Cardiovascular: Positive for leg swelling  Negative for chest pain and palpitations  Gastrointestinal: Negative for abdominal pain, blood in stool, constipation, diarrhea, nausea and vomiting  Genitourinary: Negative for decreased urine volume, difficulty urinating, frequency, hematuria and urgency  Skin: Negative for color change and pallor  Neurological: Negative for dizziness, syncope, weakness, light-headedness, numbness and headaches  Psychiatric/Behavioral: Negative for agitation, behavioral problems and confusion  All other ROS negative unless stated above     Historical Information   Past Medical History:   Diagnosis Date    Allergic rhinitis     last assessed 9/12/12    Finger fracture, right     Closed fx of the middle phalanx of the right 5th finger  last assessed 1/30/14    GI bleed 2/22/2019    Hemorrhoids     last assessed 2/10/14    Hyperlipidemia     Hypertension     Stroke [facility])      Past Surgical History:   Procedure Laterality Date    AORTIC VALVE REPLACEMENT  07/30/2014    AVR with 25mm [facility] Ease bovine pericardial valve    CARDIAC CATHETERIZATION  07/18/2014    SLB left main-normal, circumflex-normal, ramus intermedius-normal, RCA was large and dominant giving rise to the PDA & a large posterolateral branch  No disease  last assessed 8/19/14     COLONOSCOPY N/A 2/25/2019    Procedure: COLONOSCOPY;  Surgeon: Torito Mcqueen DO;  [facility]: [facility];   Service: Gastroenterology    ESOPHAGOGASTRODUODENOSCOPY N/A 2/22/2019    Procedure: ESOPHAGOGASTRODUODENOSCOPY (EGD)-roadshow overnight;  Surgeon: Paige Carrera DO;  Location: [facility]; Service: Gastroenterology    ESOPHAGOGASTRODUODENOSCOPY N/A 2/23/2019    Procedure: ESOPHAGOGASTRODUODENOSCOPY (EGD); Surgeon: Arben Gan MD;  Location: [facility]; Service: Gastroenterology    ESOPHAGOGASTRODUODENOSCOPY N/A 2/25/2019    Procedure: ESOPHAGOGASTRODUODENOSCOPY (EGD); Surgeon: Paige Carrera DO;  [facility];   Service: Gastroenterology    IR NON-TUNNELED CENTRAL LINE PLACEMENT  3/1/2019    IR NON-TUNNELED CENTRAL LINE PLACEMENT  2/4/2019    IR TUNNELED DIALYSIS CATHETER CHECK/CHANGE/REPOSITION/ANGIOPLASTY  4/18/2019    IR TUNNELED DIALYSIS CATHETER PLACEMENT  2/4/2019    IR TUNNELED DIALYSIS CATHETER PLACEMENT  2/18/2019    KNEE ARTHROSCOPY      KNEE CARTILAGE SURGERY Left     medial meniscus tear     TESTICLE SURGERY      TONSILLECTOMY AND ADENOIDECTOMY      TOOTH EXTRACTION       Social History   Social History     Substance and Sexual Activity   Alcohol Use Never    Comment: ocassion /never drank alcohol per [employer]      Social History     Substance and Sexual Activity   Drug Use No     E-Cigarette/Vaping    E-Cigarette Use Never User      E-Cigarette/Vaping Substances    Nicotine No     THC No     CBD No     Flavoring No     Other No     Unknown No      Social History     Tobacco Use   Smoking Status Never Smoker   Smokeless Tobacco Never Used     Family History: non-contributory    Meds/Allergies   all current active meds have been reviewed  Allergies   Allergen Reactions    Amiodarone      Developed Rash    Flagyl [Metronidazole] Itching     Patient had 2 episodes of itching and redness with flagyl     Penicillins Rash     However, has subsequently tolerated Cefazolin and Cefepime       Objective   First Vitals:   Blood Pressure: 105/53 (08/11/21 1018)  Pulse: 90 (08/11/21 1018)  Temperature: 98 °F (36 7 °C) (08/11/21 1052)  Temp Source: Temporal (08/11/21 1052)  Respirations: (!) 24 (08/11/21 1018)  Height: 5' 11" (180 3 cm) (08/11/21 1543)  Weight - Scale: (!) 157 kg (345 lb 10 9 oz) (08/11/21 1543)  SpO2: 91 % (08/11/21 1018)    Current Vitals:   Blood Pressure: (!) 71/48 (08/14/21 0920)  Pulse: 90 (08/14/21 0920)  Temperature: 100 1 °F (37 8 °C) (08/14/21 0845)  Temp Source: Temporal (08/14/21 0845)  Respirations: (!) 30 (08/14/21 0920)  Height: 5' 11" (180 3 cm) (08/11/21 1543)  Weight - Scale: (!) 157 kg (345 lb 10 9 oz) (08/14/21 0445)  SpO2: 98 % (08/14/21 0920)      Intake/Output Summary (Last 24 hours) at 8/14/2021 1021  Last data filed at 8/14/2021 0950  Gross per 24 hour   Intake 1370 ml   Output 580 ml   Net 790 ml       Invasive Devices     Peripheral Intravenous Line            Peripheral IV 08/13/21 Right Antecubital 1 day    Peripheral IV 08/14/21 Dorsal (posterior); Left Hand <1 day                Physical Exam  Vitals reviewed  Constitutional:       General: He is not in acute distress  Appearance: Normal appearance  He is obese  He is not ill-appearing or diaphoretic  HENT:      Head: Normocephalic and atraumatic  Mouth/Throat:      Mouth: Mucous membranes are dry  Eyes:      General: No scleral icterus  Extraocular Movements: Extraocular movements intact  Conjunctiva/sclera: Conjunctivae normal       Pupils: Pupils are equal, round, and reactive to light  Cardiovascular:      Rate and Rhythm: Normal rate  Rhythm irregular  Pulses: Normal pulses  Pulmonary:      Effort: Pulmonary effort is normal       Comments: Breath sounds diminished bilaterally   Abdominal:      General: Bowel sounds are normal       Palpations: Abdomen is soft  Tenderness: There is no abdominal tenderness  There is no guarding or rebound  Musculoskeletal:      Cervical back: Normal range of motion and neck supple  No rigidity  Skin:     General: Skin is warm and dry        Coloration: Skin is not jaundiced or pale  Neurological:      General: No focal deficit present  Mental Status: He is alert and oriented to person, place, and time  Sensory: No sensory deficit  Motor: No weakness  Psychiatric:         Mood and Affect: Mood normal          Behavior: Behavior normal          Lab Results:   I have personally reviewed pertinent lab results  , CBC:   Lab Results   Component Value Date    WBC 15 76 (H) 08/14/2021    HGB 10 4 (L) 08/14/2021    HCT 38 2 08/14/2021    MCV 82 08/14/2021     08/14/2021    MCH 22 3 (L) 08/14/2021    MCHC 27 2 (L) 08/14/2021    RDW 20 6 (H) 08/14/2021    NRBC 0 08/14/2021   , CMP:   Lab Results   Component Value Date    SODIUM 139 08/14/2021    K 2 8 (L) 08/14/2021    CL 99 (L) 08/14/2021    CO2 35 (H) 08/14/2021    BUN 34 (H) 08/14/2021    CREATININE 1 55 (H) 08/14/2021    CALCIUM 8 4 08/14/2021    AST 12 08/14/2021    ALT 14 08/14/2021    ALKPHOS 81 08/14/2021    EGFR 47 08/14/2021     Imaging: I have personally reviewed pertinent reports  EKG, Pathology, and Other Studies: I have personally reviewed pertinent reports  Counseling / Coordination of Care  Total floor / unit time spent today 45 minutes  Greater than 50% of total time was spent with the patient and / or family counseling and / or coordination of care  A description of the counseling / coordination of care: Discussion with surgeon, discussion with primary team and nursing        Lauryn Torres PA-C  08/14/21

## 2021-08-14 NOTE — ASSESSMENT & PLAN NOTE
Patient with worsening wbc count, new fever and hypotension, reviewed with Critical care at [facility]  Per review of chart his BP is usually on the lower side   So we elected to trial albumin and solucrtef initally and assess response  If MAP < 65 will start dominick  Lactic normal  Blood cultures obtained x 2 today   procal negative, continue to trend  Will consider PAN CT if wbc worsens   Surgical consult for Central line   Continue vanco/cefepime for now

## 2021-08-14 NOTE — RESPIRATORY THERAPY NOTE
08/13/21 2229   Respiratory Assessment   Assessment Type During-treatment   General Appearance Drowsy   Respiratory Pattern Dyspnea with exertion   Chest Assessment Chest expansion symmetrical   Bilateral Breath Sounds Diminished   Non-Invasive Information   O2 Interface Device Full face mask   Non-Invasive Ventilation Mode BiPAP   SpO2 96 %   $ Pulse Oximetry Spot Check Charge Completed   Non-Invasive Settings   IPAP (cm) 16 cm   EPAP (cm) 8 cm   Rate (Set) 8   FiO2 (%) 30   Pressure Support (cm H2O) 8   Rise Time 3   Inspiratory Time (Set) 1 5   Non-Invasive Readings   Total Rate 25   Vt (mL) ([facility]) 432   MV ([facility]) 10 2   Peak Pressure (Obs) 16   Skin Intervention Mask rotated;Skin intact   Non-Invasive Alarms   Insp Pressure High (cm H20) 24   Insp Pressure Low (cm H20) 4   Low Insp Pressure Time (sec) 20 sec   MV Low (L/min) 4   Vt High (mL) 1500   Vt Low (mL) 200   High Resp Rate (BPM) 40 BPM   Low Resp Rate (BPM) 6 BPM   Apnea Interval (sec) 20   Apnea Rate 8   Apnea Volume (mL) 10

## 2021-08-14 NOTE — PLAN OF CARE
Problem: Potential for Falls  Goal: Patient will remain free of falls  Description: INTERVENTIONS:  - Educate patient/family on patient safety including physical limitations  - Instruct patient to call for assistance with activity   - Consult OT/PT to assist with strengthening/mobility   - Keep Call bell within reach  - Keep bed low and locked with side rails adjusted as appropriate  - Keep care items and personal belongings within reach  - Initiate and maintain comfort rounds  - Make Fall Risk Sign visible to staff  - Offer Toileting every 2 Hours, in advance of need  - Initiate/Maintain bed alarm  - Apply yellow socks and bracelet for high fall risk patients  - Consider moving patient to room near nurses station  Outcome: Progressing     Problem: MOBILITY - ADULT  Goal: Maintain or return to baseline ADL function  Description: INTERVENTIONS:  -  Assess patient's ability to carry out ADLs; assess patient's baseline for ADL function and identify physical deficits which impact ability to perform ADLs (bathing, care of mouth/teeth, toileting, grooming, dressing, etc )  - Assess/evaluate cause of self-care deficits   - Assess range of motion  - Assess patient's mobility; develop plan if impaired  - Assess patient's need for assistive devices and provide as appropriate  - Encourage maximum independence but intervene and supervise when necessary  - Involve family in performance of ADLs  - Assess for home care needs following discharge   - Consider OT consult to assist with ADL evaluation and planning for discharge  - Provide patient education as appropriate  Outcome: Progressing  Goal: Maintains/Returns to pre admission functional level  Description: INTERVENTIONS:  - Perform BMAT or MOVE assessment daily    - Set and communicate daily mobility goal to care team and patient/family/caregiver  - Collaborate with rehabilitation services on mobility goals if consulted  - Perform Range of Motion 3 times a day    - Reposition patient every 2 hours    - Dangle patient as medically able  - Record patient progress and toleration of activity level   Outcome: Progressing     Problem: PAIN - ADULT  Goal: Verbalizes/displays adequate comfort level or baseline comfort level  Description: Interventions:  - Encourage patient to monitor pain and request assistance  - Assess pain using appropriate pain scale  - Administer analgesics based on type and severity of pain and evaluate response  - Implement non-pharmacological measures as appropriate and evaluate response  - Consider cultural and social influences on pain and pain management  - Notify physician/advanced practitioner if interventions unsuccessful or patient reports new pain  Outcome: Adequate for Discharge     Problem: INFECTION - ADULT  Goal: Absence or prevention of progression during hospitalization  Description: INTERVENTIONS:  - Assess and monitor for signs and symptoms of infection  - Monitor lab/diagnostic results  - Monitor all insertion sites, i e  indwelling lines, tubes, and drains  - Administer medications as ordered  - Instruct and encourage patient and family to use good hand hygiene technique  - Identify and instruct in appropriate isolation precautions for identified infection/condition  Outcome: Progressing     Problem: SAFETY ADULT  Goal: Patient will remain free of falls  Description: INTERVENTIONS:  - Educate patient/family on patient safety including physical limitations  - Instruct patient to call for assistance with activity   - Consult OT/PT to assist with strengthening/mobility   - Keep Call bell within reach  - Keep bed low and locked with side rails adjusted as appropriate  - Keep care items and personal belongings within reach  - Initiate and maintain comfort rounds  - Make Fall Risk Sign visible to staff  - Offer Toileting every 2 Hours, in advance of need  - Initiate/Maintain bed alarm  - Apply yellow socks and bracelet for high fall risk patients  - Consider moving patient to room near nurses station  Outcome: Progressing  Goal: Maintain or return to baseline ADL function  Description: INTERVENTIONS:  -  Assess patient's ability to carry out ADLs; assess patient's baseline for ADL function and identify physical deficits which impact ability to perform ADLs (bathing, care of mouth/teeth, toileting, grooming, dressing, etc )  - Assess/evaluate cause of self-care deficits   - Assess range of motion  - Assess patient's mobility; develop plan if impaired  - Assess patient's need for assistive devices and provide as appropriate  - Encourage maximum independence but intervene and supervise when necessary  - Involve family in performance of ADLs  - Assess for home care needs following discharge   - Consider OT consult to assist with ADL evaluation and planning for discharge  - Provide patient education as appropriate  Outcome: Progressing  Goal: Maintains/Returns to pre admission functional level  Description: INTERVENTIONS:  - Perform BMAT or MOVE assessment daily    - Set and communicate daily mobility goal to care team and patient/family/caregiver  - Collaborate with rehabilitation services on mobility goals if consulted  - Perform Range of Motion 3 times a day  - Reposition patient every 2 hours    - Dangle patient as medically able  - Record patient progress and toleration of activity level   Outcome: Progressing     Problem: DISCHARGE PLANNING  Goal: Discharge to home or other facility with appropriate resources  Description: INTERVENTIONS:  - Identify barriers to discharge w/patient and caregiver  - Arrange for needed discharge resources and transportation as appropriate  - Identify discharge learning needs (meds, wound care, etc )  - Refer to Case Management Department for coordinating discharge planning if the patient needs post-hospital services based on physician/advanced practitioner order or complex needs related to functional status, cognitive ability, or social support system  Outcome: Progressing     Problem: Knowledge Deficit  Goal: Patient/family/caregiver demonstrates understanding of disease process, treatment plan, medications, and discharge instructions  Description: Complete learning assessment and assess knowledge base    Interventions:  - Provide teaching at level of understanding  - Provide teaching via preferred learning methods  Outcome: Progressing     Problem: CARDIOVASCULAR - ADULT  Goal: Maintains optimal cardiac output and hemodynamic stability  Description: INTERVENTIONS:  - Monitor I/O, vital signs and rhythm  - Monitor for S/S and trends of decreased cardiac output  - Administer and titrate ordered vasoactive medications to optimize hemodynamic stability  - Assess quality of pulses, skin color and temperature  - Assess for signs of decreased coronary artery perfusion  - Instruct patient to report change in severity of symptoms  Outcome: Progressing  Goal: Absence of cardiac dysrhythmias or at baseline rhythm  Description: INTERVENTIONS:  - Continuous cardiac monitoring, vital signs, obtain 12 lead EKG if ordered  - Administer antiarrhythmic and heart rate control medications as ordered  - Monitor electrolytes and administer replacement therapy as ordered  Outcome: Progressing     Problem: RESPIRATORY - ADULT  Goal: Achieves optimal ventilation and oxygenation  Description: INTERVENTIONS:  - Assess for changes in respiratory status  - Assess for changes in mentation and behavior  - Position to facilitate oxygenation and minimize respiratory effort  - Oxygen administered by appropriate delivery if ordered  - Initiate smoking cessation education as indicated  - Encourage broncho-pulmonary hygiene including cough, deep breathe, Incentive Spirometry  - Assess the need for suctioning and aspirate as needed  - Assess and instruct to report SOB or any respiratory difficulty  - Respiratory Therapy support as indicated  Outcome: Progressing     Problem: SKIN/TISSUE INTEGRITY - ADULT  Goal: Skin Integrity remains intact(Skin Breakdown Prevention)  Description: Assess:  -Perform Lee assessment every shift  -Clean and moisturize skin every shift  -Inspect skin when repositioning, toileting, and assisting with ADLS  -Assess extremities for adequate circulation and sensation     Bed Management:  -Have minimal linens on bed & keep smooth, unwrinkled  -Change linens as needed when moist or perspiring  -Avoid sitting or lying in one position for more than 2 hours while in bed  -Keep HOB at 30 degrees     Toileting:  -Offer bedside commode  -Assess for incontinence every 2 hours  -Use incontinent care products after each incontinent episode such as Hydroguard    Activity:  -Mobilize patient as medically able  -Encourage or provide ROM exercises   -Turn and reposition patient every 2 Hours  -Use appropriate equipment to lift or move patient in bed        Skin Care:  -Avoid use of baby powder, tape, friction and shearing, hot water or constrictive clothing  -Relieve pressure over bony prominences using Alleyvn foam pads  -Do not massage red bony areas    Outcome: Progressing  Goal: Pressure injury heals and does not worsen  Description: Interventions:  - Implement low air loss mattress or specialty surface (Criteria met)  - Apply silicone foam dressing  - Apply fecal or urinary incontinence containment device   - Perform passive or active ROM every shift  - Turn and reposition patient & offload bony prominences every 2 hours   - Utilize friction reducing device or surface for transfers   - Use incontinent care products after each incontinent episode such as hyrdoguard  - Consider nutrition services referral as needed  Outcome: Progressing     Problem: MUSCULOSKELETAL - ADULT  Goal: Maintain or return mobility to safest level of function  Description: INTERVENTIONS:  - Assess patient's ability to carry out ADLs; assess patient's baseline for ADL function and identify physical deficits which impact ability to perform ADLs (bathing, care of mouth/teeth, toileting, grooming, dressing, etc )  - Assess/evaluate cause of self-care deficits   - Assess range of motion  - Assess patient's mobility  - Assess patient's need for assistive devices and provide as appropriate  - Encourage maximum independence but intervene and supervise when necessary  - Involve family in performance of ADLs  - Assess for home care needs following discharge   - Consider OT consult to assist with ADL evaluation and planning for discharge  - Provide patient education as appropriate  Outcome: Progressing     Problem: Prexisting or High Potential for Compromised Skin Integrity  Goal: Skin integrity is maintained or improved  Description: INTERVENTIONS:  - Identify patients at risk for skin breakdown  - Assess and monitor skin integrity  - Assess and monitor nutrition and hydration status  - Monitor labs   - Assess for incontinence   - Turn and reposition patient  - Assist with mobility/ambulation  - Relieve pressure over bony prominences  - Avoid friction and shearing  - Provide appropriate hygiene as needed including keeping skin clean and dry  - Evaluate need for skin moisturizer/barrier cream  - Collaborate with interdisciplinary team   - Patient/family teaching  - Consider wound care consult   Outcome: Progressing     Problem: Nutrition/Hydration-ADULT  Goal: Nutrient/Hydration intake appropriate for improving, restoring or maintaining nutritional needs  Description: Monitor and assess patient's nutrition/hydration status for malnutrition  Collaborate with interdisciplinary team and initiate plan and interventions as ordered  Monitor patient's weight and dietary intake as ordered or per policy  Utilize nutrition screening tool and intervene as necessary  Determine patient's food preferences and provide high-protein, high-caloric foods as appropriate       INTERVENTIONS:  - Monitor oral intake, urinary output, labs, and treatment plans  - Assess nutrition and hydration status and recommend course of action  - Evaluate amount of meals eaten  - Assist patient with eating if necessary   - Allow adequate time for meals  - Recommend/ encourage appropriate diets, oral nutritional supplements, and vitamin/mineral supplements  - Order, calculate, and assess calorie counts as needed  - Recommend, monitor, and adjust tube feedings and TPN/PPN based on assessed needs  - Assess need for intravenous fluids  - Provide specific nutrition/hydration education as appropriate  - Include patient/family/caregiver in decisions related to nutrition  Outcome: Progressing

## 2021-08-14 NOTE — ASSESSMENT & PLAN NOTE
Without fever or wbc count , it's unclear if findings are acute or sequale of covid infection this year  Possible aspiration vs  HCAP vs  Gram negative pna  Day 4 vanco/cefepime  procal negative but wbc worsening with new febrile episodes 8/14/21  Procalcitonin, urine Legionella and pneumococcal antigens negative  Flagyl discontinued due to allergy, allergy added  Continue vancomycin and cefepime, cefepime for UTI

## 2021-08-14 NOTE — ASSESSMENT & PLAN NOTE
Continue to hold cardizem   Continue metoprolol   Eliquis for [facility]

## 2021-08-14 NOTE — QUICK NOTE
I was notified by the nursing staff that the patient refused central line placement  I spoke with the patient and explained the risks of untreated hypotension  The patient is now agreeable to central line placement  I called the patient's wife and left a message on her voicemail requesting a return phone call

## 2021-08-14 NOTE — PROCEDURES
Central Line Insertion    Date/Time: 8/14/2021 4:04 PM  Performed by: Royer Chu MD  Authorized by: Royer Chu MD     Patient location:  ICU  Consent:     Consent obtained:  Written    Consent given by:  Patient    Risks discussed:  Arterial puncture, infection, bleeding and pneumothorax    Alternatives discussed:  Delayed treatment  Universal protocol:     Procedure explained and questions answered to patient or proxy's satisfaction: yes      Immediately prior to procedure, a time out was called: yes      Patient identity confirmed:  Verbally with patient  Pre-procedure details:     Hand hygiene: Hand hygiene performed prior to insertion      Skin preparation:  ChloraPrep    Skin preparation agent: Skin preparation agent completely dried prior to procedure    Indications:     Central line indications: medications requiring central line      Site selection rationale:  Rt IJ allows easiest access  Anesthesia (see MAR for exact dosages): Anesthesia method:  Local infiltration    Local anesthetic:  Lidocaine 1% w/o epi  Procedure details:     Location:  Right internal jugular    Vessel type: vein      Laterality:  Right    Approach: percutaneous technique used      Patient position:  Trendelenburg    Catheter type:  Triple lumen    Catheter size:  7 5 Fr    Landmarks identified: yes      Ultrasound guidance: yes      Ultrasound image availability:  Not saved    Sterile ultrasound techniques: Sterile gel and sterile probe covers were used      Manometry confirmation: no      Number of attempts:  3    Successful placement: yes      Vessel of catheter tip end: In SVC  Post-procedure details:     Post-procedure:  Dressing applied    Assessment:  Blood return through all ports and no pneumothorax on x-ray    Patient tolerance of procedure: Tolerated well, no immediate complications  Comments:      Central line flushes easily, good blood return all ports, however, XRAY shows that it is doubled back on itself  Ultimately this should be repositioned, but with Fluoroscopic guidance

## 2021-08-15 ENCOUNTER — APPOINTMENT (INPATIENT)
Dept: CT IMAGING | Facility: HOSPITAL | Age: 61
DRG: 871 | End: 2021-08-15
Payer: MEDICARE

## 2021-08-15 ENCOUNTER — TELEPHONE (OUTPATIENT)
Dept: CT IMAGING | Facility: HOSPITAL | Age: 61
End: 2021-08-15

## 2021-08-15 PROBLEM — N18.9 ACUTE KIDNEY INJURY SUPERIMPOSED ON CHRONIC KIDNEY DISEASE (HCC): Status: ACTIVE | Noted: 2021-02-09

## 2021-08-15 PROBLEM — L27.0 DRUG RASH: Status: ACTIVE | Noted: 2019-02-16

## 2021-08-15 PROBLEM — N17.9 ACUTE KIDNEY INJURY SUPERIMPOSED ON CHRONIC KIDNEY DISEASE (HCC): Status: ACTIVE | Noted: 2021-02-09

## 2021-08-15 LAB
ANION GAP SERPL CALCULATED.3IONS-SCNC: 4 MMOL/L (ref 4–13)
BASOPHILS # BLD AUTO: 0.02 THOUSANDS/ΜL (ref 0–0.1)
BASOPHILS NFR BLD AUTO: 0 % (ref 0–1)
BUN SERPL-MCNC: 38 MG/DL (ref 5–25)
CALCIUM SERPL-MCNC: 8.2 MG/DL (ref 8.3–10.1)
CHLORIDE SERPL-SCNC: 104 MMOL/L (ref 100–108)
CO2 SERPL-SCNC: 34 MMOL/L (ref 21–32)
CREAT SERPL-MCNC: 1.66 MG/DL (ref 0.6–1.3)
CREAT UR-MCNC: 60.6 MG/DL
CREAT UR-MCNC: 60.6 MG/DL
EOSINOPHIL # BLD AUTO: 1.09 THOUSAND/ΜL (ref 0–0.61)
EOSINOPHIL NFR BLD AUTO: 6 % (ref 0–6)
ERYTHROCYTE [DISTWIDTH] IN BLOOD BY AUTOMATED COUNT: 20.5 % (ref 11.6–15.1)
GFR SERPL CREATININE-BSD FRML MDRD: 44 ML/MIN/1.73SQ M
GLUCOSE SERPL-MCNC: 121 MG/DL (ref 65–140)
HCT VFR BLD AUTO: 30.7 % (ref 36.5–49.3)
HGB BLD-MCNC: 8.3 G/DL (ref 12–17)
IMM GRANULOCYTES # BLD AUTO: 0.24 THOUSAND/UL (ref 0–0.2)
IMM GRANULOCYTES NFR BLD AUTO: 1 % (ref 0–2)
LYMPHOCYTES # BLD AUTO: 0.35 THOUSANDS/ΜL (ref 0.6–4.47)
LYMPHOCYTES NFR BLD AUTO: 2 % (ref 14–44)
MAGNESIUM SERPL-MCNC: 2.2 MG/DL (ref 1.6–2.6)
MCH RBC QN AUTO: 22 PG (ref 26.8–34.3)
MCHC RBC AUTO-ENTMCNC: 27 G/DL (ref 31.4–37.4)
MCV RBC AUTO: 81 FL (ref 82–98)
MONOCYTES # BLD AUTO: 0.53 THOUSAND/ΜL (ref 0.17–1.22)
MONOCYTES NFR BLD AUTO: 3 % (ref 4–12)
NEUTROPHILS # BLD AUTO: 16.42 THOUSANDS/ΜL (ref 1.85–7.62)
NEUTS SEG NFR BLD AUTO: 88 % (ref 43–75)
NRBC BLD AUTO-RTO: 0 /100 WBCS
PLATELET # BLD AUTO: 191 THOUSANDS/UL (ref 149–390)
PMV BLD AUTO: 10.6 FL (ref 8.9–12.7)
POTASSIUM SERPL-SCNC: 3.5 MMOL/L (ref 3.5–5.3)
PROT UR-MCNC: 109 MG/DL
PROT/CREAT UR: 1.8 MG/G{CREAT} (ref 0–0.1)
RBC # BLD AUTO: 3.78 MILLION/UL (ref 3.88–5.62)
SODIUM 24H UR-SCNC: <5 MOL/L
SODIUM SERPL-SCNC: 142 MMOL/L (ref 136–145)
UUN 24H UR-MCNC: 571 MG/DL
VANCOMYCIN SERPL-MCNC: 16.7 UG/ML
WBC # BLD AUTO: 18.65 THOUSAND/UL (ref 4.31–10.16)

## 2021-08-15 PROCEDURE — 94760 N-INVAS EAR/PLS OXIMETRY 1: CPT

## 2021-08-15 PROCEDURE — 80202 ASSAY OF VANCOMYCIN: CPT | Performed by: FAMILY MEDICINE

## 2021-08-15 PROCEDURE — 80048 BASIC METABOLIC PNL TOTAL CA: CPT | Performed by: FAMILY MEDICINE

## 2021-08-15 PROCEDURE — 83735 ASSAY OF MAGNESIUM: CPT | Performed by: FAMILY MEDICINE

## 2021-08-15 PROCEDURE — 84300 ASSAY OF URINE SODIUM: CPT | Performed by: FAMILY MEDICINE

## 2021-08-15 PROCEDURE — 84156 ASSAY OF PROTEIN URINE: CPT | Performed by: FAMILY MEDICINE

## 2021-08-15 PROCEDURE — 84540 ASSAY OF URINE/UREA-N: CPT | Performed by: FAMILY MEDICINE

## 2021-08-15 PROCEDURE — 99291 CRITICAL CARE FIRST HOUR: CPT | Performed by: FAMILY MEDICINE

## 2021-08-15 PROCEDURE — NC001 PR NO CHARGE: Performed by: RADIOLOGY

## 2021-08-15 PROCEDURE — 85025 COMPLETE CBC W/AUTO DIFF WBC: CPT | Performed by: FAMILY MEDICINE

## 2021-08-15 PROCEDURE — G1004 CDSM NDSC: HCPCS

## 2021-08-15 PROCEDURE — 82570 ASSAY OF URINE CREATININE: CPT | Performed by: FAMILY MEDICINE

## 2021-08-15 PROCEDURE — 99254 IP/OBS CNSLTJ NEW/EST MOD 60: CPT | Performed by: INTERNAL MEDICINE

## 2021-08-15 PROCEDURE — 74176 CT ABD & PELVIS W/O CONTRAST: CPT

## 2021-08-15 PROCEDURE — 87205 SMEAR GRAM STAIN: CPT | Performed by: INTERNAL MEDICINE

## 2021-08-15 PROCEDURE — 94660 CPAP INITIATION&MGMT: CPT

## 2021-08-15 PROCEDURE — 71250 CT THORAX DX C-: CPT

## 2021-08-15 RX ORDER — MIDODRINE HYDROCHLORIDE 5 MG/1
10 TABLET ORAL
Status: DISCONTINUED | OUTPATIENT
Start: 2021-08-15 | End: 2021-08-19 | Stop reason: HOSPADM

## 2021-08-15 RX ADMIN — DIPHENHYDRAMINE HYDROCHLORIDE 25 MG: 25 TABLET ORAL at 12:53

## 2021-08-15 RX ADMIN — CEFEPIME HYDROCHLORIDE 2000 MG: 2 INJECTION, SOLUTION INTRAVENOUS at 14:34

## 2021-08-15 RX ADMIN — PANTOPRAZOLE SODIUM 40 MG: 40 TABLET, DELAYED RELEASE ORAL at 16:35

## 2021-08-15 RX ADMIN — PANTOPRAZOLE SODIUM 40 MG: 40 TABLET, DELAYED RELEASE ORAL at 06:22

## 2021-08-15 RX ADMIN — HYDROCORTISONE SODIUM SUCCINATE 50 MG: 100 INJECTION, POWDER, FOR SOLUTION INTRAMUSCULAR; INTRAVENOUS at 14:34

## 2021-08-15 RX ADMIN — MIDODRINE HYDROCHLORIDE 10 MG: 5 TABLET ORAL at 09:11

## 2021-08-15 RX ADMIN — CEFEPIME HYDROCHLORIDE 2000 MG: 2 INJECTION, SOLUTION INTRAVENOUS at 01:31

## 2021-08-15 RX ADMIN — SERTRALINE HYDROCHLORIDE 100 MG: 100 TABLET ORAL at 09:56

## 2021-08-15 RX ADMIN — MIDODRINE HYDROCHLORIDE 10 MG: 5 TABLET ORAL at 12:53

## 2021-08-15 RX ADMIN — MIDODRINE HYDROCHLORIDE 10 MG: 5 TABLET ORAL at 16:34

## 2021-08-15 RX ADMIN — DIPHENHYDRAMINE HYDROCHLORIDE 25 MG: 25 TABLET ORAL at 05:21

## 2021-08-15 RX ADMIN — DIPHENHYDRAMINE HYDROCHLORIDE 25 MG: 25 TABLET ORAL at 21:08

## 2021-08-15 RX ADMIN — HYDROCORTISONE SODIUM SUCCINATE 50 MG: 100 INJECTION, POWDER, FOR SOLUTION INTRAMUSCULAR; INTRAVENOUS at 05:20

## 2021-08-15 RX ADMIN — ALLOPURINOL 100 MG: 100 TABLET ORAL at 09:55

## 2021-08-15 RX ADMIN — PHENYLEPHRINE HYDROCHLORIDE 55 MCG/MIN: 10 INJECTION INTRAVENOUS at 05:30

## 2021-08-15 RX ADMIN — APIXABAN 2.5 MG: 2.5 TABLET, FILM COATED ORAL at 17:00

## 2021-08-15 RX ADMIN — HYDROCORTISONE SODIUM SUCCINATE 50 MG: 100 INJECTION, POWDER, FOR SOLUTION INTRAMUSCULAR; INTRAVENOUS at 21:06

## 2021-08-15 RX ADMIN — APIXABAN 2.5 MG: 2.5 TABLET, FILM COATED ORAL at 09:55

## 2021-08-15 RX ADMIN — VANCOMYCIN HYDROCHLORIDE 1000 MG: 1 INJECTION, POWDER, LYOPHILIZED, FOR SOLUTION INTRAVENOUS at 10:37

## 2021-08-15 RX ADMIN — MAGNESIUM OXIDE TAB 400 MG (241.3 MG ELEMENTAL MG) 400 MG: 400 (241.3 MG) TAB at 09:54

## 2021-08-15 RX ADMIN — ACETAMINOPHEN 650 MG: 325 TABLET, FILM COATED ORAL at 05:20

## 2021-08-15 RX ADMIN — ATORVASTATIN CALCIUM 40 MG: 40 TABLET, FILM COATED ORAL at 16:33

## 2021-08-15 RX ADMIN — ASPIRIN 81 MG: 81 TABLET, COATED ORAL at 09:56

## 2021-08-15 RX ADMIN — METOPROLOL TARTRATE 50 MG: 50 TABLET, FILM COATED ORAL at 21:05

## 2021-08-15 RX ADMIN — Medication 1 CAPSULE: at 16:33

## 2021-08-15 NOTE — PLAN OF CARE
Problem: Potential for Falls  Goal: Patient will remain free of falls  Description: INTERVENTIONS:  - Educate patient/family on patient safety including physical limitations  - Instruct patient to call for assistance with activity   - Consult OT/PT to assist with strengthening/mobility   - Keep Call bell within reach  - Keep bed low and locked with side rails adjusted as appropriate  - Keep care items and personal belongings within reach  - Initiate and maintain comfort rounds  - Make Fall Risk Sign visible to staff  - Apply yellow socks and bracelet for high fall risk patients  - Consider moving patient to room near nurses station  Outcome: Progressing     Problem: MOBILITY - ADULT  Goal: Maintain or return to baseline ADL function  Description: INTERVENTIONS:  -  Assess patient's ability to carry out ADLs; assess patient's baseline for ADL function and identify physical deficits which impact ability to perform ADLs (bathing, care of mouth/teeth, toileting, grooming, dressing, etc )  - Assess/evaluate cause of self-care deficits   - Assess range of motion  - Assess patient's mobility; develop plan if impaired  - Assess patient's need for assistive devices and provide as appropriate  - Encourage maximum independence but intervene and supervise when necessary  - Involve family in performance of ADLs  - Assess for home care needs following discharge   - Consider OT consult to assist with ADL evaluation and planning for discharge  - Provide patient education as appropriate  Outcome: Progressing  Goal: Maintains/Returns to pre admission functional level  Description: INTERVENTIONS:  - Perform BMAT or MOVE assessment daily    - Set and communicate daily mobility goal to care team and patient/family/caregiver     - Collaborate with rehabilitation services on mobility goals if consulted  - Out of bed for toileting  - Record patient progress and toleration of activity level   Outcome: Progressing     Problem: PAIN - ADULT  Goal: Verbalizes/displays adequate comfort level or baseline comfort level  Description: Interventions:  - Encourage patient to monitor pain and request assistance  - Assess pain using appropriate pain scale  - Administer analgesics based on type and severity of pain and evaluate response  - Implement non-pharmacological measures as appropriate and evaluate response  - Consider cultural and social influences on pain and pain management  - Notify physician/advanced practitioner if interventions unsuccessful or patient reports new pain  Outcome: Progressing     Problem: INFECTION - ADULT  Goal: Absence or prevention of progression during hospitalization  Description: INTERVENTIONS:  - Assess and monitor for signs and symptoms of infection  - Monitor lab/diagnostic results  - Monitor all insertion sites, i e  indwelling lines, tubes, and drains  - Administer medications as ordered  - Instruct and encourage patient and family to use good hand hygiene technique  - Identify and instruct in appropriate isolation precautions for identified infection/condition  Outcome: Progressing     Problem: SAFETY ADULT  Goal: Patient will remain free of falls  Description: INTERVENTIONS:  - Educate patient/family on patient safety including physical limitations  - Instruct patient to call for assistance with activity   - Consult OT/PT to assist with strengthening/mobility   - Keep Call bell within reach  - Keep bed low and locked with side rails adjusted as appropriate  - Keep care items and personal belongings within reach  - Initiate and maintain comfort rounds  - Make Fall Risk Sign visible to staff  - Apply yellow socks and bracelet for high fall risk patients  - Consider moving patient to room near nurses station  Outcome: Progressing  Goal: Maintain or return to baseline ADL function  Description: INTERVENTIONS:  -  Assess patient's ability to carry out ADLs; assess patient's baseline for ADL function and identify physical deficits which impact ability to perform ADLs (bathing, care of mouth/teeth, toileting, grooming, dressing, etc )  - Assess/evaluate cause of self-care deficits   - Assess range of motion  - Assess patient's mobility; develop plan if impaired  - Assess patient's need for assistive devices and provide as appropriate  - Encourage maximum independence but intervene and supervise when necessary  - Involve family in performance of ADLs  - Assess for home care needs following discharge   - Consider OT consult to assist with ADL evaluation and planning for discharge  - Provide patient education as appropriate  Outcome: Progressing  Goal: Maintains/Returns to pre admission functional level  Description: INTERVENTIONS:  - Perform BMAT or MOVE assessment daily    - Set and communicate daily mobility goal to care team and patient/family/caregiver  - Collaborate with rehabilitation services on mobility goals if consulted  - Out of bed for toileting  - Record patient progress and toleration of activity level   Outcome: Progressing     Problem: DISCHARGE PLANNING  Goal: Discharge to home or other facility with appropriate resources  Description: INTERVENTIONS:  - Identify barriers to discharge w/patient and caregiver  - Arrange for needed discharge resources and transportation as appropriate  - Identify discharge learning needs (meds, wound care, etc )  - Refer to Case Management Department for coordinating discharge planning if the patient needs post-hospital services based on physician/advanced practitioner order or complex needs related to functional status, cognitive ability, or social support system  Outcome: Progressing     Problem: Knowledge Deficit  Goal: Patient/family/caregiver demonstrates understanding of disease process, treatment plan, medications, and discharge instructions  Description: Complete learning assessment and assess knowledge base    Interventions:  - Provide teaching at level of understanding  - Provide teaching via preferred learning methods  Outcome: Progressing     Problem: CARDIOVASCULAR - ADULT  Goal: Maintains optimal cardiac output and hemodynamic stability  Description: INTERVENTIONS:  - Monitor I/O, vital signs and rhythm  - Monitor for S/S and trends of decreased cardiac output  - Administer and titrate ordered vasoactive medications to optimize hemodynamic stability  - Assess quality of pulses, skin color and temperature  - Assess for signs of decreased coronary artery perfusion  - Instruct patient to report change in severity of symptoms  Outcome: Progressing  Goal: Absence of cardiac dysrhythmias or at baseline rhythm  Description: INTERVENTIONS:  - Continuous cardiac monitoring, vital signs, obtain 12 lead EKG if ordered  - Administer antiarrhythmic and heart rate control medications as ordered  - Monitor electrolytes and administer replacement therapy as ordered  Outcome: Progressing     Problem: RESPIRATORY - ADULT  Goal: Achieves optimal ventilation and oxygenation  Description: INTERVENTIONS:  - Assess for changes in respiratory status  - Assess for changes in mentation and behavior  - Position to facilitate oxygenation and minimize respiratory effort  - Oxygen administered by appropriate delivery if ordered  - Encourage broncho-pulmonary hygiene including cough, deep breathe, Incentive Spirometry  - Assess the need for suctioning and aspirate as needed  - Assess and instruct to report SOB or any respiratory difficulty  - Respiratory Therapy support as indicated  Outcome: Progressing     Problem: SKIN/TISSUE INTEGRITY - ADULT  Goal: Skin Integrity remains intact(Skin Breakdown Prevention)  Description: Assess:  -Perform Lee assessment every 12 hrs  -Clean and moisturize skin every shift  -Inspect skin when repositioning, toileting, and assisting with ADLS  -Assess under medical devices such as oxygen tubing every shift  -Assess extremities for adequate circulation and sensation     Bed Management:  -Have minimal linens on bed & keep smooth, unwrinkled  -Change linens as needed when moist or perspiring  -Avoid sitting or lying in one position for more than 2 hours while in bed  -Keep HOB at 30-45 degrees     Toileting:  -Assess for incontinence every 2 hrs    Activity:  -Encourage activity and walks on unit  -Encourage or provide ROM exercises   -Turn and reposition patient every 2 Hours  -Use appropriate equipment to lift or move patient in bed    Skin Care:  -Avoid use of baby powder, tape, friction and shearing, hot water or constrictive clothing  -Relieve pressure over bony prominences using allevyn foam  -Do not massage red bony areas    Outcome: Progressing  Goal: Pressure injury heals and does not worsen  Description: Interventions:  - Implement low air loss mattress or specialty surface (Criteria met)  - Apply silicone foam dressing  - Limit chair time to 4 hour intervals  - Apply fecal or urinary incontinence containment device   - Perform passive or active ROM every shift  - Turn and reposition patient & offload bony prominences every 2 hours   - Utilize friction reducing device or surface for transfers   - Use incontinent care products after each incontinent episode such as foam cleanser  - Consider nutrition services referral as needed  Outcome: Progressing     Problem: MUSCULOSKELETAL - ADULT  Goal: Maintain or return mobility to safest level of function  Description: INTERVENTIONS:  - Assess patient's ability to carry out ADLs; assess patient's baseline for ADL function and identify physical deficits which impact ability to perform ADLs (bathing, care of mouth/teeth, toileting, grooming, dressing, etc )  - Assess/evaluate cause of self-care deficits   - Assess range of motion  - Assess patient's mobility  - Assess patient's need for assistive devices and provide as appropriate  - Encourage maximum independence but intervene and supervise when necessary  - Involve family in performance of ADLs  - Assess for home care needs following discharge   - Consider OT consult to assist with ADL evaluation and planning for discharge  - Provide patient education as appropriate  Outcome: Progressing     Problem: Prexisting or High Potential for Compromised Skin Integrity  Goal: Skin integrity is maintained or improved  Description: INTERVENTIONS:  - Identify patients at risk for skin breakdown  - Assess and monitor skin integrity  - Assess and monitor nutrition and hydration status  - Monitor labs   - Assess for incontinence   - Turn and reposition patient  - Assist with mobility/ambulation  - Relieve pressure over bony prominences  - Avoid friction and shearing  - Provide appropriate hygiene as needed including keeping skin clean and dry  - Evaluate need for skin moisturizer/barrier cream  - Collaborate with interdisciplinary team   - Patient/family teaching  - Consider wound care consult   Outcome: Progressing     Problem: Nutrition/Hydration-ADULT  Goal: Nutrient/Hydration intake appropriate for improving, restoring or maintaining nutritional needs  Description: Monitor and assess patient's nutrition/hydration status for malnutrition  Collaborate with interdisciplinary team and initiate plan and interventions as ordered  Monitor patient's weight and dietary intake as ordered or per policy  Utilize nutrition screening tool and intervene as necessary  Determine patient's food preferences and provide high-protein, high-caloric foods as appropriate       INTERVENTIONS:  - Monitor oral intake, urinary output, labs, and treatment plans  - Assess nutrition and hydration status and recommend course of action  - Evaluate amount of meals eaten  - Assist patient with eating if necessary   - Allow adequate time for meals  - Recommend/ encourage appropriate diets, oral nutritional supplements, and vitamin/mineral supplements  - Assess need for intravenous fluids  - Provide specific nutrition/hydration education as appropriate  - Include patient/family/caregiver in decisions related to nutrition  Outcome: Progressing

## 2021-08-15 NOTE — ASSESSMENT & PLAN NOTE
Without fever or wbc count , it's unclear if findings are acute or sequale of covid infection this year  Possible aspiration vs  HCAP vs  Gram negative pna  Day 5 vanco/cefepime  procal negative but wbc worsening with new febrile episodes 8/14/21   Procalcitonin, urine Legionella and pneumococcal antigens negative  Flagyl discontinued due to allergy, allergy added  Continue vancomycin and cefepime, cefepime for UTI  Patient no longer having febrile episodes, but wbc continues to worsen   iD Consult in am

## 2021-08-15 NOTE — PLAN OF CARE
Problem: Potential for Falls  Goal: Patient will remain free of falls  Description: INTERVENTIONS:  - Educate patient/family on patient safety including physical limitations  - Instruct patient to call for assistance with activity   - Consult OT/PT to assist with strengthening/mobility   - Keep Call bell within reach  - Keep bed low and locked with side rails adjusted as appropriate  - Keep care items and personal belongings within reach  - Initiate and maintain comfort rounds  - Make Fall Risk Sign visible to staff  - Offer Toileting every 2 Hours, in advance of need  - Initiate/Maintain bed alarm  - Apply yellow socks and bracelet for high fall risk patients  - Consider moving patient to room near nurses station  8/15/2021 0007 by Tacos Schultz RN  Outcome: Progressing  8/15/2021 0006 by Tacos Schultz RN  Outcome: Progressing     Problem: MOBILITY - ADULT  Goal: Maintain or return to baseline ADL function  Description: INTERVENTIONS:  -  Assess patient's ability to carry out ADLs; assess patient's baseline for ADL function and identify physical deficits which impact ability to perform ADLs (bathing, care of mouth/teeth, toileting, grooming, dressing, etc )  - Assess/evaluate cause of self-care deficits   - Assess range of motion  - Assess patient's mobility; develop plan if impaired  - Assess patient's need for assistive devices and provide as appropriate  - Encourage maximum independence but intervene and supervise when necessary  - Involve family in performance of ADLs  - Assess for home care needs following discharge   - Consider OT consult to assist with ADL evaluation and planning for discharge  - Provide patient education as appropriate  8/15/2021 0007 by Tacos Schultz RN  Outcome: Progressing  8/15/2021 0006 by Tacos Schultz RN  Outcome: Progressing  Goal: Maintains/Returns to pre admission functional level  Description: INTERVENTIONS:  - Perform BMAT or MOVE assessment daily    - Set and communicate daily mobility goal to care team and patient/family/caregiver  - Collaborate with rehabilitation services on mobility goals if consulted  - Perform Range of Motion 3 times a day  - Reposition patient every 2 hours    - Dangle patient as medically able  -- Record patient progress and toleration of activity level   8/15/2021 0007 by Elayne Moreno RN  Outcome: Progressing  8/15/2021 0006 by Elayne Moreno RN  Outcome: Progressing     Problem: PAIN - ADULT  Goal: Verbalizes/displays adequate comfort level or baseline comfort level  Description: Interventions:  - Encourage patient to monitor pain and request assistance  - Assess pain using appropriate pain scale  - Administer analgesics based on type and severity of pain and evaluate response  - Implement non-pharmacological measures as appropriate and evaluate response  - Consider cultural and social influences on pain and pain management  - Notify physician/advanced practitioner if interventions unsuccessful or patient reports new pain  8/15/2021 0007 by Elayne Moreno RN  Outcome: Progressing  8/15/2021 0006 by Elayne Moreno RN  Outcome: Progressing     Problem: INFECTION - ADULT  Goal: Absence or prevention of progression during hospitalization  Description: INTERVENTIONS:  - Assess and monitor for signs and symptoms of infection  - Monitor lab/diagnostic results  - Monitor all insertion sites, i e  indwelling lines, tubes, and drains  - Administer medications as ordered  - Instruct and encourage patient and family to use good hand hygiene technique  - Identify and instruct in appropriate isolation precautions for identified infection/condition  8/15/2021 0007 by Elayne Moreno RN  Outcome: Progressing  8/15/2021 0006 by Elayne Moreno RN  Outcome: Progressing     Problem: SAFETY ADULT  Goal: Patient will remain free of falls  Description: INTERVENTIONS:  - Educate patient/family on patient safety including physical limitations  - Instruct patient to call for assistance with activity   - Consult OT/PT to assist with strengthening/mobility   - Keep Call bell within reach  - Keep bed low and locked with side rails adjusted as appropriate  - Keep care items and personal belongings within reach  - Initiate and maintain comfort rounds  - Make Fall Risk Sign visible to staff  - Offer Toileting every 2 Hours, in advance of need  - Initiate/Maintain bed alarm  - Apply yellow socks and bracelet for high fall risk patients  - Consider moving patient to room near nurses station  8/15/2021 0007 by Charles Wallace RN  Outcome: Progressing  8/15/2021 0006 by Charles Wallace RN  Outcome: Progressing  Goal: Maintain or return to baseline ADL function  Description: INTERVENTIONS:  -  Assess patient's ability to carry out ADLs; assess patient's baseline for ADL function and identify physical deficits which impact ability to perform ADLs (bathing, care of mouth/teeth, toileting, grooming, dressing, etc )  - Assess/evaluate cause of self-care deficits   - Assess range of motion  - Assess patient's mobility; develop plan if impaired  - Assess patient's need for assistive devices and provide as appropriate  - Encourage maximum independence but intervene and supervise when necessary  - Involve family in performance of ADLs  - Assess for home care needs following discharge   - Consider OT consult to assist with ADL evaluation and planning for discharge  - Provide patient education as appropriate  8/15/2021 0007 by Charles Wallace RN  Outcome: Progressing  8/15/2021 0006 by Charles Wallace RN  Outcome: Progressing  Goal: Maintains/Returns to pre admission functional level  Description: INTERVENTIONS:  - Perform BMAT or MOVE assessment daily    - Set and communicate daily mobility goal to care team and patient/family/caregiver  - Collaborate with rehabilitation services on mobility goals if consulted  - Perform Range of Motion 3 times a day    - Reposition patient every 2 hours   - Dangle patient as medially able  - Record patient progress and toleration of activity level   8/15/2021 0007 by Zandra Celestin RN  Outcome: Progressing  8/15/2021 0006 by Zandra Celestin RN  Outcome: Progressing     Problem: DISCHARGE PLANNING  Goal: Discharge to home or other facility with appropriate resources  Description: INTERVENTIONS:  - Identify barriers to discharge w/patient and caregiver  - Arrange for needed discharge resources and transportation as appropriate  - Identify discharge learning needs (meds, wound care, etc )  - Refer to Case Management Department for coordinating discharge planning if the patient needs post-hospital services based on physician/advanced practitioner order or complex needs related to functional status, cognitive ability, or social support system  8/15/2021 0007 by Zandra Celestin RN  Outcome: Progressing  8/15/2021 0006 by Zandra Celestin RN  Outcome: Progressing     Problem: Knowledge Deficit  Goal: Patient/family/caregiver demonstrates understanding of disease process, treatment plan, medications, and discharge instructions  Description: Complete learning assessment and assess knowledge base    Interventions:  - Provide teaching at level of understanding  - Provide teaching via preferred learning methods  8/15/2021 0007 by Zandra Celestin RN  Outcome: Progressing  8/15/2021 0006 by Zandra Celestin RN  Outcome: Progressing     Problem: CARDIOVASCULAR - ADULT  Goal: Maintains optimal cardiac output and hemodynamic stability  Description: INTERVENTIONS:  - Monitor I/O, vital signs and rhythm  - Monitor for S/S and trends of decreased cardiac output  - Administer and titrate ordered vasoactive medications to optimize hemodynamic stability  - Assess quality of pulses, skin color and temperature  - Assess for signs of decreased coronary artery perfusion  - Instruct patient to report change in severity of symptoms  8/15/2021 0007 by Zandra Celestin RN  Outcome: Progressing  Note: Pt requiring Master-synephrine at this time  8/15/2021 0006 by Naty Guzman RN  Outcome: Progressing  Note: Pt requiring Master-synephrine at this time  Goal: Absence of cardiac dysrhythmias or at baseline rhythm  Description: INTERVENTIONS:  - Continuous cardiac monitoring, vital signs, obtain 12 lead EKG if ordered  - Administer antiarrhythmic and heart rate control medications as ordered  - Monitor electrolytes and administer replacement therapy as ordered  8/15/2021 0007 by Naty Guzman RN  Outcome: Progressing  8/15/2021 0006 by Naty Guzman RN  Outcome: Progressing     Problem: RESPIRATORY - ADULT  Goal: Achieves optimal ventilation and oxygenation  Description: INTERVENTIONS:  - Assess for changes in respiratory status  - Assess for changes in mentation and behavior  - Position to facilitate oxygenation and minimize respiratory effort  - Oxygen administered by appropriate delivery if ordered  - Initiate smoking cessation education as indicated  - Encourage broncho-pulmonary hygiene including cough, deep breathe, Incentive Spirometry  - Assess the need for suctioning and aspirate as needed  - Assess and instruct to report SOB or any respiratory difficulty  - Respiratory Therapy support as indicated  8/15/2021 0007 by Naty Guzman RN  Outcome: Progressing  8/15/2021 0006 by Naty Guzman RN  Outcome: Progressing     Problem: SKIN/TISSUE INTEGRITY - ADULT  Goal: Skin Integrity remains intact(Skin Breakdown Prevention)  Description: Assess:  -Perform Lee assessment every shift  -Clean and moisturize skin every shift  -Inspect skin when repositioning, toileting, and assisting with ADLS  -Assess extremities for adequate circulation and sensation     Bed Management:  -Have minimal linens on bed & keep smooth, unwrinkled  -Change linens as needed when moist or perspiring  -Avoid sitting or lying in one position for more than 2 hours while in bed  -Keep HOB at 30 degrees Toileting:  -Offer bedside commode  -Assess for incontinence every 2 hours  -Use incontinent care products after each incontinent episode such as calazime    Activity:  -Mobilize patient as medically able  -Encourage activity and walks on unit  -Encourage or provide ROM exercises   -Turn and reposition patient every 2 Hours        Skin Care:  -Avoid use of baby powder, tape, friction and shearing, hot water or constrictive clothing  -Relieve pressure over bony prominences using Alleyvn  -Do not massage red bony areas      8/15/2021 0007 by Darren Blanco RN  Outcome: Progressing  8/15/2021 0006 by Darren Blanco RN  Outcome: Progressing  Goal: Pressure injury heals and does not worsen  Description: Interventions:  - Implement low air loss mattress or specialty surface (Criteria met)  - Apply silicone foam dressing  - Apply fecal or urinary incontinence containment device   - Perform passive or active ROM every shift  - Turn and reposition patient & offload bony prominences every 2 hours   - Utilize friction reducing device or surface for transfers   - Use incontinent care products after each incontinent episode such as calazime  - Consider nutrition services referral as needed  8/15/2021 0007 by Darren Blanco RN  Outcome: Progressing  8/15/2021 0006 by Darren Blanco RN  Outcome: Progressing     Problem: MUSCULOSKELETAL - ADULT  Goal: Maintain or return mobility to safest level of function  Description: INTERVENTIONS:  - Assess patient's ability to carry out ADLs; assess patient's baseline for ADL function and identify physical deficits which impact ability to perform ADLs (bathing, care of mouth/teeth, toileting, grooming, dressing, etc )  - Assess/evaluate cause of self-care deficits   - Assess range of motion  - Assess patient's mobility  - Assess patient's need for assistive devices and provide as appropriate  - Encourage maximum independence but intervene and supervise when necessary  - Involve family in performance of ADLs  - Assess for home care needs following discharge   - Consider OT consult to assist with ADL evaluation and planning for discharge  - Provide patient education as appropriate  8/15/2021 0007 by Lenard Castillo RN  Outcome: Progressing  8/15/2021 0006 by Lenard Castillo RN  Outcome: Progressing     Problem: Prexisting or High Potential for Compromised Skin Integrity  Goal: Skin integrity is maintained or improved  Description: INTERVENTIONS:  - Identify patients at risk for skin breakdown  - Assess and monitor skin integrity  - Assess and monitor nutrition and hydration status  - Monitor labs   - Assess for incontinence   - Turn and reposition patient  - Assist with mobility/ambulation  - Relieve pressure over bony prominences  - Avoid friction and shearing  - Provide appropriate hygiene as needed including keeping skin clean and dry  - Evaluate need for skin moisturizer/barrier cream  - Collaborate with interdisciplinary team   - Patient/family teaching  - Consider wound care consult   8/15/2021 0007 by Lenard Castillo RN  Outcome: Progressing  8/15/2021 0006 by Lenard Castillo RN  Outcome: Progressing     Problem: Nutrition/Hydration-ADULT  Goal: Nutrient/Hydration intake appropriate for improving, restoring or maintaining nutritional needs  Description: Monitor and assess patient's nutrition/hydration status for malnutrition  Collaborate with interdisciplinary team and initiate plan and interventions as ordered  Monitor patient's weight and dietary intake as ordered or per policy  Utilize nutrition screening tool and intervene as necessary  Determine patient's food preferences and provide high-protein, high-caloric foods as appropriate       INTERVENTIONS:  - Monitor oral intake, urinary output, labs, and treatment plans  - Assess nutrition and hydration status and recommend course of action  - Evaluate amount of meals eaten  - Assist patient with eating if necessary   - Allow adequate time for meals  - Recommend/ encourage appropriate diets, oral nutritional supplements, and vitamin/mineral supplements  - Order, calculate, and assess calorie counts as needed  - Recommend, monitor, and adjust tube feedings and TPN/PPN based on assessed needs  - Assess need for intravenous fluids  - Provide specific nutrition/hydration education as appropriate  - Include patient/family/caregiver in decisions related to nutrition  8/15/2021 0007 by Nikita Sauceda RN  Outcome: Progressing  8/15/2021 0006 by Nikita Sauceda RN  Outcome: Progressing

## 2021-08-15 NOTE — ASSESSMENT & PLAN NOTE
Secondary to hypercapnia versus UTI  He continues to improve, but still has moments of confusion  He's oriented to self / place / situation, thinks its 2012 and soloomn is president   Overall I suspect he's close to baseline

## 2021-08-15 NOTE — ASSESSMENT & PLAN NOTE
Wt Readings from Last 3 Encounters:   08/15/21 (!) 159 kg (349 lb 10 4 oz)   07/14/21 (!) 155 kg (342 lb 3 2 oz)   03/04/21 (!) 153 kg (338 lb 3 oz)     Chronically on bumex 1mg BID ,   Day 2 Bumex 1 mg IV b i d    Renal function slightly worse, hold diuretics 8/13/21  Holding diuretics, will consult nephro, obtain urine studies 8/15/21

## 2021-08-15 NOTE — ASSESSMENT & PLAN NOTE
History of dysphagia with CT concerning aspiration  Modified diet instituted after speech evaluation

## 2021-08-15 NOTE — ASSESSMENT & PLAN NOTE
This was initally felt due to flagyl, and there was concern for DRESS syndrome, however after review with critical care he's had eosinophilia in the past      It also appears that he is sensitive to chlorhexidine, as the rash worsened today after chlorhexidine bath

## 2021-08-15 NOTE — ASSESSMENT & PLAN NOTE
Patient with worsening wbc count, new fever and hypotension, reviewed with Critical care at [facility]  Per review of chart his BP is usually on the lower side  So we elected to trial albumin and solucrtef initally and assess response  If MAP < 65 will start dominick  Lactic normal  Repeat Blood cultures obtained x 2   8/14/21  procal negative, continue to trend  CT chest / abdomen / pelvis reviewed but patient had 1 formed bowel movement yesterday     Will consult ID for input tomorrow   Central line placed by surgical team    Continue vanco/cefepime for now  Midodrine restarted, wean Neosynepherine to maintain MAP > 65

## 2021-08-15 NOTE — TELEMEDICINE
e-Consult (IPC)  - Interventional Radiology  Garland Godfrey 57 y o  male MRN: [id number]  Unit/Bed#:  Encounter: [id number]    IR has been consulted to evaluate the patient, determine the appropriate procedure, and whether or not a procedure can and should be performed regarding the care of Garland Godfrey  IP Consult to IR  Consult performed by: Carlos Ovalle MD  Consult ordered by: Ary Elam MD        08/15/21      Assessment/Recommendation:   The patient presented with septic shock secondary to multifocal pneumonia and currently in the ICU  Right IJ central line placement was attempted today but radiograph showed that the catheter coils back with the tip in the right IJ  Catheter repositioning was requested  I reviewed the radiograph personally  May consider left IJ central line placement or removal of the indwelling right IJ central line with placement of a new right IJ central line  An IR image guided central line placement order has been placed and please call interventional radiology at [facility] tomorrow morning to confirm plan for this patient  Total time spent in review of data, discussion with requesting provider and rendering advice was 10 minutes       Patient or appropriate family member was verbally informed by Dr Lizette Francis of this consultative service on their behalf to provide more timely access to specialty care in lieu of an in person consultation  Verbal consent was obtained  Thank you for allowing Interventional Radiology to participate in the care of Garland Godfrey  Please don't hesitate to call or TigerText us with any questions       Carlos Ovalle MD

## 2021-08-15 NOTE — CONSULTS
Consultation - Nephrology   Miladis Richardson 57 y o  male MRN: [id number]  Unit/Bed#:  Encounter: [id number]      A/P:  1  Acute kidney injury on top of chronic kidney disease   Creatinine slightly improved over last 24 hours, possible acute tubular necrosis given the patient's history of this disorder  Patient has urine studies pending, will follow up on those results  Patient also just had a CT scan the abdomen pelvis performed, will look to ensure that there are no anatomic issues from the renal standpoint  Given history of potential allergy to Flagyl, will check urine eosinophils  Patient has eosinophilia at this time  2  Chronic kidney disease stage 3 with baseline creatinine between 1 1-1 3 mg/dL  3  Hypokalemia   Will provide the patient with potassium supplementation continue monitor closely  4  Acute on chronic diastolic congestive heart failure   May continue to provide diuretics as indicated to appropriately manage the patient's overall volume status  This will become tricky as the patient also has a history of chronic hypotension which requires midodrine, at the moment the patient remains on phenylephrine  Also, the patient has a rash at this time which will contribute to hypotension  5  Chronic hypotension   Continue midodrine 10 mg 3 times a day  6  Septic shock   Currently being treated for acute cystitis as well as a multifocal pneumonia  Continue phenylephrine according to hospitalist/Critical Care  7  Paroxysmal atrial fibrillation   Patient is on metoprolol 50 mg 3 times a day  Thank you for allowing us to participate in the care of your patient  Please feel free to contact us regarding the care of this patient, or any other questions/concerns that may be applicable      Patient Active Problem List   Diagnosis    Aortic stenosis, mild    Essential hypertension    Hyperlipidemia    Left bundle branch block    Hypokalemia    Murmur    Osteoarthritis of both knees  Pericardial disease    Sciatica    Age-related cataract of right eye    Venous insufficiency    Bilateral leg edema    Stress-induced cardiomyopathy    History of aortic valve replacement with bioprosthetic valve    Persistent atrial fibrillation (HCC)    Septic shock (HCC)    Leukocytosis    Metabolic encephalopathy    Skin rash    Coagulopathy (HCC)    Age-related nuclear cataract, bilateral    Open angle with borderline findings, low risk, bilateral    Presence of intraocular lens    Vitreous degeneration of both eyes    Left arm swelling    Dysphagia    Left internal jugular vein thrombus    Morbid obesity     Depression    Gastric ulcer    Paroxysmal atrial fibrillation (HCC)    COVID-19    Acute kidney injury superimposed on CKD (HCC)    Chronic anemia    Hypernatremia    Seizure (HCC)    History of stroke    CKD (chronic kidney disease), stage III (HCC)    H/O heart valve replacement with tissue graft    Lower extremity edema    Acute on chronic respiratory failure with hypoxia and hypercapnia (HCC)    Obesity hypoventilation syndrome (HCC)    Acute on chronic diastolic (congestive) heart failure (HCC)    Multifocal pneumonia    Acute cystitis with hematuria       History of Present Illness   Physician Requesting Consult: Claudette Sine, MD  Reason for Consult / Principal Problem:  Acute kidney injury   Hx and PE limited by:   HPI: Rico Cooper is a 57y o  year old male who presented to the emergency room and admitted to the hospital on August 11th due to hypoxia from home time nursing home  The patient was noted to have a multifocal pneumonia, placed on cefepime, vancomycin, potentially metronidazole and admitted to the intensive care unit  The patient at that time was suffering with septic shock  Of note, apparently the patient is chronically on midodrine, which he was not initially placed on  At this time the patient remains on phenylephrine    During the course of the admission, the patient is also noted to have acute episode of cystitis, and as volume expansion was given and other underlying medical conditions were treated, the patient volume status need to be controlled with diuretics  In addition to this, the patient began to develop a rash which was noted to be around the time of metronidazole infusion, at which time this medication was discontinued  He has not had exposures to IV contrast since admission  From the renal standpoint, patient appears to have a baseline creatinine between 1 1-1 3 mg/dL, he presents to the hospital with a creatinine of 1 45 mg/dL, improved slightly 1 34 mg/dL, however since then it has been slowly increasing with a peak at 1 71 mg/dL yesterday, August 14th, this morning is 1 66 mg/dL  Electrolytes in general have been okay however, there was an episode of hypokalemia on the 14th which improved and this morning his potassium is 3 5 millimole/L  Patient's blood sugars appear to be well controlled  Previous electronic medical records reviewed during the course this consultation, earlier this calendar year the patient had admission at which time he suffered an episode of acute tubular necrosis  History obtained from chart review and the patient    Review of Systems - Negative except as mentioned above in HPI, more specifics as mentioned below    Review of Systems - General ROS: positive for  - fatigue  Psychological ROS: positive for - anxiety  Ophthalmic ROS: negative  ENT ROS: negative  Allergy and Immunology ROS: negative  Hematological and Lymphatic ROS: negative  Endocrine ROS: negative  Respiratory ROS: negative  Cardiovascular ROS: negative  Gastrointestinal ROS: negative  Genito-Urinary ROS: negative  Musculoskeletal ROS: negative  Neurological ROS: negative  Dermatological ROS: negative    Historical Information   Past Medical History:   Diagnosis Date    Allergic rhinitis     last assessed 9/12/12   Reggie Parker fracture, right     Closed fx of the middle phalanx of the right 5th finger  last assessed 1/30/14    GI bleed 2/22/2019    Hemorrhoids     last assessed 2/10/14    Hyperlipidemia     Hypertension     Stroke [facility])      Past Surgical History:   Procedure Laterality Date    AORTIC VALVE REPLACEMENT  07/30/2014    AVR with 25mm [facility] Ease bovine pericardial valve    CARDIAC CATHETERIZATION  07/18/2014    SLB left main-normal, circumflex-normal, ramus intermedius-normal, RCA was large and dominant giving rise to the PDA & a large posterolateral branch  No disease  last assessed 8/19/14     COLONOSCOPY N/A 2/25/2019    Procedure: COLONOSCOPY;  Surgeon: Richard Zhang DO;  Location: [facility]; Service: Gastroenterology    ESOPHAGOGASTRODUODENOSCOPY N/A 2/22/2019    Procedure: ESOPHAGOGASTRODUODENOSCOPY (EGD)-roadshow overnight;  Surgeon: Richard Zhang DO;  Location: [facility]; Service: Gastroenterology    ESOPHAGOGASTRODUODENOSCOPY N/A 2/23/2019    Procedure: ESOPHAGOGASTRODUODENOSCOPY (EGD); Surgeon: Sarah Gallo MD;  [facility]; Service: Gastroenterology    ESOPHAGOGASTRODUODENOSCOPY N/A 2/25/2019    Procedure: ESOPHAGOGASTRODUODENOSCOPY (EGD); Surgeon: Richard Zhang DO;  Location[facility];   Service: Gastroenterology    IR NON-TUNNELED CENTRAL LINE PLACEMENT  3/1/2019    IR NON-TUNNELED CENTRAL LINE PLACEMENT  2/4/2019    IR TUNNELED DIALYSIS CATHETER CHECK/CHANGE/REPOSITION/ANGIOPLASTY  4/18/2019    IR TUNNELED DIALYSIS CATHETER PLACEMENT  2/4/2019    IR TUNNELED DIALYSIS CATHETER PLACEMENT  2/18/2019    KNEE ARTHROSCOPY      KNEE CARTILAGE SURGERY Left     medial meniscus tear     TESTICLE SURGERY      TONSILLECTOMY AND ADENOIDECTOMY      TOOTH EXTRACTION       Social History   Social History     Substance and Sexual Activity   Alcohol Use Never    Comment: ocassion /never drank alcohol per [employer]      Social History     Substance and Sexual Activity Drug Use No     Social History     Tobacco Use   Smoking Status Never Smoker   Smokeless Tobacco Never Used     Family History   Problem Relation Age of Onset    Lung cancer Mother     Heart disease Mother         pacemaker placement     Coronary artery disease Father     Hypertension Father     Heart attack Father     Cancer Family         bladder        Meds/Allergies   all current active meds have been reviewed, current meds:   Current Facility-Administered Medications   Medication Dose Route Frequency    acetaminophen (TYLENOL) tablet 650 mg  650 mg Oral Q6H PRN    albuterol inhalation solution 2 5 mg  2 5 mg Nebulization Q4H PRN    allopurinol (ZYLOPRIM) tablet 100 mg  100 mg Oral Daily    apixaban (ELIQUIS) tablet 2 5 mg  2 5 mg Oral BID    aspirin (ECOTRIN LOW STRENGTH) EC tablet 81 mg  81 mg Oral Daily    atorvastatin (LIPITOR) tablet 40 mg  40 mg Oral Daily With Dinner    b complex-vitamin C-folic acid (NEPHROCAPS) capsule 1 capsule  1 capsule Oral Daily With Dinner    cefepime (MAXIPIME) IVPB (premix in dextrose) 2,000 mg 50 mL  2,000 mg Intravenous Q12H    diphenhydrAMINE (BENADRYL) tablet 25 mg  25 mg Oral Q6H PRN    hydrocortisone sodium succinate (PF) (Solu-CORTEF) injection 50 mg  50 mg Intravenous Q8H [address]    magnesium oxide (MAG-OX) tablet 400 mg  400 mg Oral Daily    metoprolol tartrate (LOPRESSOR) tablet 50 mg  50 mg Oral TID    midodrine (PROAMATINE) tablet 10 mg  10 mg Oral TID AC    oxyCODONE (ROXICODONE) IR tablet 5 mg  5 mg Oral Q4H PRN    pantoprazole (PROTONIX) EC tablet 40 mg  40 mg Oral BID AC    phenylephrine (COLE-SYNEPHRINE) 50 mg (STANDARD CONCENTRATION) in sodium chloride 0 9% 250 mL   mcg/min Intravenous Titrated    sertraline (ZOLOFT) tablet 100 mg  100 mg Oral Daily    vancomycin (VANCOCIN) 1,000 mg in sodium chloride 0 9 % 250 mL IVPB  1,000 mg Intravenous Daily PRN    and PTA meds:    Medications Prior to Admission   Medication    acetaminophen (TYLENOL) 325 mg tablet    allopurinol (ZYLOPRIM) 100 mg tablet    apixaban (Eliquis) 2 5 mg    aspirin (ECOTRIN LOW STRENGTH) 81 mg EC tablet    atorvastatin (LIPITOR) 40 mg tablet    bumetanide (BUMEX) 1 mg tablet    cholecalciferol (VITAMIN D3) 1,000 units tablet    diltiazem (CARDIZEM CD) 180 mg 24 hr capsule    magnesium oxide (MAG-OX) 400 mg    Melatonin 3 MG CAPS    metoprolol tartrate (LOPRESSOR) 50 mg tablet    OXYGEN-HELIUM IN    pantoprazole (PROTONIX) 40 mg tablet    POTASSIUM CHLORIDE PO    sertraline (ZOLOFT) 100 mg tablet    sertraline (ZOLOFT) 25 mg tablet    albuterol (ProAir HFA) 90 mcg/act inhaler    b complex-vitamin C-folic acid (NEPHROCAPS) 1 mg capsule    bisacodyl (DULCOLAX) 10 mg suppository    enoxaparin (LOVENOX) 40 mg/0 4 mL    hydrOXYzine HCL (ATARAX) 25 mg tablet    midodrine (PROAMATINE) 10 MG tablet    Probiotic Product (BACID PO)    traMADol (ULTRAM) 50 mg tablet         Allergies   Allergen Reactions    Amiodarone      Developed Rash    Flagyl [Metronidazole] Itching     Patient had 2 episodes of itching and redness with flagyl     Penicillins Rash     However, has subsequently tolerated Cefazolin and Cefepime       Objective     Intake/Output Summary (Last 24 hours) at 8/15/2021 1019  Last data filed at 8/15/2021 0520  Gross per 24 hour   Intake 1604 4 ml   Output 950 ml   Net 654 4 ml       Invasive Devices:        Physical Exam      I/O last 3 completed shifts: In: 2734 4 [P O :1620;  I V :164 4; IV Piggyback:950]  Out: 1350 [Urine:1350]    Vitals:    08/15/21 0630   BP: 93/52   Pulse: 64   Resp: 22   Temp:    SpO2: 99%       Gen: in NAD, Alert/Awake  HEENT: no sclerous icterus, MMM, neck supple  CV: +S1/S2, RRR  Lungs:  Reduced bilaterally  Abd: +BS, soft NT/ND  Ext: all four extremities are warm, +1 +2 bilateral lower extremity edema  Skin: no rashes noted  Neuro: CN II-XII intact    Current Weight: Weight - Scale: (!) 159 kg (349 lb 10 4 oz)  First Weight: Weight - Scale: (!) 157 kg (345 lb 10 9 oz)    Lab Results:  I have personally reviewed pertinent labs      CBC:   Lab Results   Component Value Date    WBC 18 65 (H) 08/15/2021    HGB 8 3 (L) 08/15/2021    HCT 30 7 (L) 08/15/2021    MCV 81 (L) 08/15/2021     08/15/2021    MCH 22 0 (L) 08/15/2021    MCHC 27 0 (L) 08/15/2021    RDW 20 5 (H) 08/15/2021    MPV 10 6 08/15/2021    NRBC 0 08/15/2021     CMP:   Lab Results   Component Value Date    K 3 5 08/15/2021     08/15/2021    CO2 34 (H) 08/15/2021    BUN 38 (H) 08/15/2021    CREATININE 1 66 (H) 08/15/2021    CALCIUM 8 2 (L) 08/15/2021    EGFR 44 08/15/2021     Phosphorus: No results found for: PHOS  Magnesium:   Lab Results   Component Value Date    MG 2 2 08/15/2021     Urinalysis: No results found for: COLORU, CLARITYU, SPECGRAV, PHUR, LEUKOCYTESUR, NITRITE, PROTEINUA, GLUCOSEU, KETONESU, BILIRUBINUR, BLOODU  Ionized Calcium: No results found for: CAION  Coagulation: No results found for: PT, INR, APTT  Troponin: No results found for: TROPONINI  ABG: No results found for: PHART, VYX9RZK, PO2ART, NNA5UHA, O5IDBYSZ, BEART, SOURCE    Results from last 7 days   Lab Units 08/15/21  0509 08/14/21  1340 08/14/21  0431 08/13/21  0558 08/11/21  1515 08/11/21  1034   POTASSIUM mmol/L 3 5 3 5 2 8* 3 5  --  4 7   CHLORIDE mmol/L 104 100 99* 101  --  102   CO2 mmol/L 34* 34* 35* 37*  --  43*   CO2, I-STAT mmol/L  --   --   --   --  >45*  --    BUN mg/dL 38* 39* 34* 35*  --  33*   CREATININE mg/dL 1 66* 1 71* 1 55* 1 58*  --  1 45*   CALCIUM mg/dL 8 2* 8 0* 8 4 8 2*  --  9 1   ALK PHOS U/L  --   --  81 78  --  90   ALT U/L  --   --  14 13  --  13   AST U/L  --   --  12 12  --  9   GLUCOSE, ISTAT mg/dl  --   --   --   --  101  --        Radiology review:  Procedure: CT chest abdomen pelvis wo contrast    Result Date: 8/15/2021  Narrative: CT CHEST, ABDOMEN AND PELVIS WITHOUT IV CONTRAST INDICATION:   septic shock , no source other than pneumonia which is being treated with vanco/cefepim  COMPARISON:  CT chest from August 11, 2021 and CTA of the abdomen and pelvis from February 22, 2019  TECHNIQUE: CT examination of the chest, abdomen and pelvis was performed without intravenous contrast   Axial, sagittal, and coronal 2D reformatted images were created from the source data and submitted for interpretation  Radiation dose length product (DLP) for this visit:  6844 mGy-cm   This examination, like all CT scans performed in the [facility], was performed utilizing techniques to minimize radiation dose exposure, including the use of iterative reconstruction and automated exposure control  Enteric contrast was not administered  FINDINGS: CHEST LUNGS:  Mild respiratory motion and prominence of the interstitial markings with small bilateral pleural effusions and persistent areas of subsegmental atelectasis at the right base  Mild groundglass opacities in the upper lobes  No new dense consolidations  PLEURA:  Small pleural effusions  HEART/GREAT VESSELS:  Median sternotomy, cardiomegaly, coronary vascular calcifications, aortic valve replacement  No pericardial effusion  MEDIASTINUM AND JAMAR:  No mediastinal or hilar adenopathy  CHEST WALL AND LOWER NECK:   Unremarkable  ABDOMEN LIVER/BILIARY TREE:  Stable cyst within the quadrate lobe  GALLBLADDER:  No calcified gallstones  No pericholecystic inflammatory change  SPLEEN:  Unremarkable  PANCREAS:  Unremarkable  ADRENAL GLANDS:  Unremarkable  KIDNEYS/URETERS:  Exophytic left upper pole renal cyst   Additional exophytic cyst arising from the lower pole of the right kidney  No obstructive uropathy, perinephric fluid collection or perinephric inflammatory changes  STOMACH AND BOWEL:  Fluid-filled sigmoid colon with moderate distention of which the maximal transverse diameter is approximately 9 9 cm  No findings to suggest obstruction  APPENDIX:  No findings to suggest appendicitis   ABDOMINOPELVIC CAVITY: No ascites  No pneumoperitoneum  No lymphadenopathy  VESSELS:  Unremarkable for patient's age  Stable asymmetric prominent collateral vessel along the right lateral chest wall extending from the axillary region to the right flank  Mild anasarca and nonspecific soft tissue inflammatory changes in the left  lateral thigh  Similar changes noted in the right flank region without a discrete fluid collection  Portions of the right lateral abdominal wall are not imaged given the patient's body habitus and the prescribed field of view  PELVIS REPRODUCTIVE ORGANS:  Unremarkable for patient's age  URINARY BLADDER:  Unremarkable  ABDOMINAL WALL/INGUINAL REGIONS:  Unremarkable  OSSEOUS STRUCTURES:  No acute fracture or destructive osseous lesion  Impression: Cardiomegaly with mild interstitial and pulmonary edema and small bilateral pleural effusions with stable right basilar subsegmental atelectasis  Distended sigmoid colon with an air-fluid level  Correlate for diarrheal illness  No evidence of bowel obstruction  Mild anasarca with scattered areas of mild inflammatory change in the subcutaneous soft tissues of the right lateral abdominal wall and left proximal thigh  Stable hepatic and renal cysts  Workstation performed: [id number]     Procedure: XR chest portable    Result Date: 8/15/2021  Narrative: CHEST INDICATION:   central line insertion  COMPARISON:  Chest radiograph and CT from 8/11/2021  EXAM PERFORMED/VIEWS:  XR CHEST PORTABLE FINDINGS:  Central line in the right supraclavicular region, coiled in the right neck with the tip pointing cephalad  Mild cardiomegaly  Median sternotomy  Mild pulmonary edema  Trace effusions  No pneumothorax  Osseous structures appear within normal limits for patient age  Impression: Malpositioned central catheter in the right supraclavicular region  Dr Menendez Found is aware of this per his procedure note on 8/14/2021  Mild pulmonary edema with trace effusions   No pneumothorax  Workstation performed: [id number]         Ese Repress, DO      This consultation note was produced in part using a dictation device which may document imprecise wording from author's original intent

## 2021-08-15 NOTE — PROGRESS NOTES
[address] [facility] [address] [place]  Progress Note Mildred Landon 1960, 57 y o  male MRN: [id number]  Unit/Bed#:  Encounter: [id number]  Primary Care Provider: Laron Mejia DO   Date and time admitted to hospital: 8/11/2021 10:17 AM    Septic shock [facility])  Assessment & Plan  Patient with worsening wbc count, new fever and hypotension, reviewed with Critical care at [facility]  Per review of chart his BP is usually on the lower side  So we elected to trial albumin and solucrtef initally and assess response  If MAP < 65 will start dominick  Lactic normal  Repeat Blood cultures obtained x 2   8/14/21  procal negative, continue to trend  CT chest / abdomen / pelvis reviewed but patient had 1 formed bowel movement yesterday  Will consult ID for input tomorrow   Central line placed by surgical team    Continue vanco/cefepime for now  Midodrine restarted, wean Neosynepherine to maintain MAP > 65    Multifocal pneumonia  Assessment & Plan  Without fever or wbc count , it's unclear if findings are acute or sequale of covid infection this year  Possible aspiration vs  HCAP vs  Gram negative pna  Day 5 vanco/cefepime  procal negative but wbc worsening with new febrile episodes 8/14/21   Procalcitonin, urine Legionella and pneumococcal antigens negative  Flagyl discontinued due to allergy, allergy added  Continue vancomycin and cefepime, cefepime for UTI  Patient no longer having febrile episodes, but wbc continues to worsen  iD Consult in am     Acute cystitis with hematuria  Assessment & Plan  Ruled out  Urine culture negative     Acute on chronic diastolic (congestive) heart failure [facility])  Assessment & Plan  Wt Readings from Last 3 Encounters:   08/15/21 (!) 159 kg (349 lb 10 4 oz)   07/14/21 (!) 155 kg (342 lb 3 2 oz)   03/04/21 (!) 153 kg (338 lb 3 oz)     Chronically on bumex 1mg BID ,   Day 2 Bumex 1 mg IV b i d    Renal function slightly worse, hold diuretics 8/13/21  Holding diuretics, will consult nephro, obtain urine studies 8/15/21    Paroxysmal atrial fibrillation (HCC)  Assessment & Plan  Continue to hold cardizem   Continue metoprolol   [facility]    Dysphagia  Assessment & Plan  History of dysphagia with CT concerning aspiration  Modified diet instituted after speech evaluation    Drug rash  Assessment & Plan  This was initally felt due to flagyl, and there was concern for DRESS syndrome, however after review with critical care he's had eosinophilia in the past      It also appears that he is sensitive to chlorhexidine, as the rash worsened today after chlorhexidine bath    Metabolic encephalopathy  Assessment & Plan  Secondary to hypercapnia versus UTI  He continues to improve, but still has moments of confusion  He's oriented to self / place / situation, thinks its 2012 and carbajal is president  Overall I suspect he's close to baseline      * Acute on chronic respiratory failure with hypoxia and hypercapnia (HCC)  Assessment & Plan  Pulmonary input appreciated  Improving, patient saturating well on baseline utilization 2 L nasal cannula        Progress Note - Sarah Garcia 57 y o  male MRN: [id number]    Unit/Bed#:  Encounter: [id number]        Subjective:     Patient seen and examined at bedside  He's awake and alert and offers no acute complaints other than being itchy      Objective:     Vitals:   Vitals:    08/15/21 1050   BP: (!) 92/49   Pulse: 69   Resp: (!) 35   Temp:    SpO2: 96%     Body mass index is 48 77 kg/m²      Intake/Output Summary (Last 24 hours) at 8/15/2021 1107  Last data filed at 8/15/2021 0520  Gross per 24 hour   Intake 1454 4 ml   Output 950 ml   Net 504 4 ml       Physical Exam:   BP (!) 92/49   Pulse 69   Temp 99 °F (37 2 °C) (Temporal)   Resp (!) 35   Ht 5' 11" (1 803 m)   Wt (!) 159 kg (349 lb 10 4 oz)   SpO2 96%   BMI 48 77 kg/m²      General appearance: alert and oriented, in no acute distress  Head: Normocephalic, without obvious abnormality, atraumatic  Lungs: diminished bilaterally, no rales/ronchi  Heart: regular rate and rhythm, S1, S2 normal, no murmur, click, rub or gallop  Abdomen: soft, non-tender; bowel sounds normal; no masses,  no organomegaly  Extremities: +1 edema  Pulses: 2+ and symmetric  Neurologic: Grossly normal   Skin: Diffuse macular rash, that is blanching and erythematous     Invasive Devices     Central Venous Catheter Line            CVC Central Lines 08/14/21 Right Internal jugular <1 day          Peripheral Intravenous Line            Peripheral IV 08/13/21 Right Antecubital 2 days    Peripheral IV 08/14/21 Dorsal (posterior); Left Hand 1 day                Results from last 7 days   Lab Units 08/15/21  0509 08/14/21  0431 08/13/21  0558   WBC Thousand/uL 18 65* 15 76* 12 06*   HEMOGLOBIN g/dL 8 3* 10 4* 9 5*   HEMATOCRIT % 30 7* 38 2 36 1*   PLATELETS Thousands/uL 191 203 177       Results from last 7 days   Lab Units 08/15/21  0509 08/14/21  1340 08/14/21  0431 08/13/21  0558 08/11/21  1515 08/11/21  1034   POTASSIUM mmol/L 3 5 3 5 2 8* 3 5  --  4 7   CHLORIDE mmol/L 104 100 99* 101  --  102   CO2 mmol/L 34* 34* 35* 37*  --  43*   CO2, I-STAT mmol/L  --   --   --   --  >45*  --    BUN mg/dL 38* 39* 34* 35*  --  33*   CREATININE mg/dL 1 66* 1 71* 1 55* 1 58*  --  1 45*   CALCIUM mg/dL 8 2* 8 0* 8 4 8 2*  --  9 1   ALK PHOS U/L  --   --  81 78  --  90   ALT U/L  --   --  14 13  --  13   AST U/L  --   --  12 12  --  9   GLUCOSE, ISTAT mg/dl  --   --   --   --  101  --        Medication Administration - last 24 hours from 08/14/2021 1107 to 08/15/2021 1107       Date/Time Order Dose Route Action Action by     08/15/2021 0955 allopurinol (ZYLOPRIM) tablet 100 mg 100 mg Oral Given Isis Cárdenas RN     08/14/2021 1144 allopurinol (ZYLOPRIM) tablet 100 mg 100 mg Oral Given Isis Cárdenas RN     08/15/2021 0955 apixaban (ELIQUIS) tablet 2 5 mg 2 5 mg Oral Given Isis Cárdenas RN     08/14/2021 1741 apixaban (ELIQUIS) tablet 2 5 mg 2 5 mg Oral Given [facility], RN     08/15/2021 8894 aspirin (ECOTRIN LOW STRENGTH) EC tablet 81 mg 81 mg Oral Given [facility], RN     08/14/2021 1741 atorvastatin (LIPITOR) tablet 40 mg 40 mg Oral Given [facility], RN     08/14/2021 1741 b complex-vitamin C-folic acid (NEPHROCAPS) capsule 1 capsule 1 capsule Oral Given [facility], RN     08/15/2021 0622 pantoprazole (PROTONIX) EC tablet 40 mg 40 mg Oral Given [facility], RN     08/14/2021 1746 pantoprazole (PROTONIX) EC tablet 40 mg 40 mg Oral Given [facility], RN     08/15/2021 0954 metoprolol tartrate (LOPRESSOR) tablet 50 mg 50 mg Oral Not Given [facility], RN     08/14/2021 2113 metoprolol tartrate (LOPRESSOR) tablet 50 mg 50 mg Oral Given [facility], RN     08/14/2021 1710 metoprolol tartrate (LOPRESSOR) tablet 50 mg 50 mg Oral Not Given [facility], RN     08/15/2021 5382 sertraline (ZOLOFT) tablet 100 mg 100 mg Oral Given [facility], RN     08/14/2021 1141 sertraline (ZOLOFT) tablet 100 mg 100 mg Oral Given [facility], RN     08/15/2021 0954 magnesium oxide (MAG-OX) tablet 400 mg 400 mg Oral Given [facility], RN     08/14/2021 1145 magnesium oxide (MAG-OX) tablet 400 mg 400 mg Oral Given [facility], RN     08/15/2021 0227 cefepime (MAXIPIME) IVPB (premix in dextrose) 2,000 mg 50 mL 0 mg Intravenous Stopped [employer], RN     08/15/2021 0131 cefepime (MAXIPIME) IVPB (premix in dextrose) 2,000 mg 50 mL 2,000 mg Intravenous [address] Grenola Don, RN     08/14/2021 1510 cefepime (MAXIPIME) IVPB (premix in dextrose) 2,000 mg 50 mL 0 mg Intravenous Stopped [facility], RN     08/14/2021 1435 cefepime (MAXIPIME) IVPB (premix in dextrose) 2,000 mg 50 mL 2,000 mg Intravenous [address] [facility], [address] [place] [address]     08/15/2021 5859 diphenhydrAMINE (BENADRYL) tablet 25 mg 25 mg Oral Given Clarisa Ochoa RN     08/14/2021 1220 diphenhydrAMINE (BENADRYL) tablet 25 mg 25 mg Oral Given Ade CARLOS Bao Alfonso, RN     08/15/2021 0520 acetaminophen (TYLENOL) tablet 650 mg 650 mg Oral Given Aimee Lisa, RN     08/14/2021 2113 acetaminophen (TYLENOL) tablet 650 mg 650 mg Oral Given Aimee Lisa RN     08/14/2021 1300 acetaminophen (TYLENOL) tablet 650 mg 650 mg Oral Given Deborah Prieto RN     08/15/2021 1037 vancomycin (VANCOCIN) 1,000 mg in sodium chloride 0 9 % 250 mL IVPB 1,000 mg Intravenous [facility], [address] [facility]     08/14/2021 1928 potassium chloride 20 mEq IVPB (premix) 0 mEq Intravenous Stopped Aimee Lisa RN     08/14/2021 1700 potassium chloride 20 mEq IVPB (premix) 20 mEq Intravenous New [address] [place] [address], [address] [facility]     08/14/2021 1741 potassium chloride (K-DUR,KLOR-CON) CR tablet 40 mEq 40 mEq Oral Given Deborah Prieto RN     08/14/2021 1143 potassium chloride (K-DUR,KLOR-CON) CR tablet 40 mEq 40 mEq Oral Given Deborah Prieto RN     08/15/2021 1038 phenylephrine (COLE-SYNEPHRINE) 50 mg (STANDARD CONCENTRATION) in sodium chloride 0 9% 250 mL 45 mcg/min Intravenous Rate/Dose Change Deborah Prieto RN     08/15/2021 0720 phenylephrine (COLE-SYNEPHRINE) 50 mg (STANDARD CONCENTRATION) in sodium chloride 0 9% 250 mL 55 mcg/min Intravenous Rate/Dose Change Deborah Pireto RN     08/15/2021 6909 phenylephrine (COLE-SYNEPHRINE) 50 mg (STANDARD CONCENTRATION) in sodium chloride 0 9% 250 mL 65 mcg/min Intravenous Rate/Dose Change Aimee Lisa RN     08/15/2021 0530 phenylephrine (COLE-SYNEPHRINE) 50 mg (STANDARD CONCENTRATION) in sodium chloride 0 9% 250 mL 55 mcg/min Intravenous 26[address] Anders Yip, RN     08/15/2021 0529 phenylephrine (COLE-SYNEPHRINE) 50 mg (STANDARD CONCENTRATION) in sodium chloride 0 9% 250 mL 0 mcg/min Intravenous Stopped Aimee Lisa RN     08/15/2021 0450 phenylephrine (COLE-SYNEPHRINE) 50 mg (STANDARD CONCENTRATION) in sodium chloride 0 9% 250 mL 55 mcg/min Intravenous Rate/Dose Change Aimee Lisa RN     08/15/2021 0315 phenylephrine (COLE-SYNEPHRINE) 50 mg (STANDARD CONCENTRATION) in sodium chloride 0 9% 250 mL 45 mcg/min Intravenous Rate/Dose Change Lendon Locket, RN     08/15/2021 0250 phenylephrine (COLE-SYNEPHRINE) 50 mg (STANDARD CONCENTRATION) in sodium chloride 0 9% 250 mL 55 mcg/min Intravenous Rate/Dose Change Lendon Locket, RN     08/15/2021 0205 phenylephrine (COLE-SYNEPHRINE) 50 mg (STANDARD CONCENTRATION) in sodium chloride 0 9% 250 mL 65 mcg/min Intravenous Rate/Dose Change Lendon Locket, RN     08/15/2021 0134 phenylephrine (COLE-SYNEPHRINE) 50 mg (STANDARD CONCENTRATION) in sodium chloride 0 9% 250 mL 55 mcg/min Intravenous Rate/Dose Change Lendon Locket, RN     08/15/2021 0118 phenylephrine (COLE-SYNEPHRINE) 50 mg (STANDARD CONCENTRATION) in sodium chloride 0 9% 250 mL 65 mcg/min Intravenous Rate/Dose Change Lendon Locket, RN     08/15/2021 0100 phenylephrine (COLE-SYNEPHRINE) 50 mg (STANDARD CONCENTRATION) in sodium chloride 0 9% 250 mL 75 mcg/min Intravenous Rate/Dose Change Lendon Locket, RN     08/15/2021 0025 phenylephrine (COLE-SYNEPHRINE) 50 mg (STANDARD CONCENTRATION) in sodium chloride 0 9% 250 mL 85 mcg/min Intravenous Rate/Dose Change Lendon Locket, RN     08/14/2021 2310 phenylephrine (COLE-SYNEPHRINE) 50 mg (STANDARD CONCENTRATION) in sodium chloride 0 9% 250 mL 95 mcg/min Intravenous Rate/Dose Change Lendon Locket, RN     08/14/2021 2238 phenylephrine (COLE-SYNEPHRINE) 50 mg (STANDARD CONCENTRATION) in sodium chloride 0 9% 250 mL 85 mcg/min Intravenous Rate/Dose Change Lendon Locket, RN     08/14/2021 2224 phenylephrine (COLE-SYNEPHRINE) 50 mg (STANDARD CONCENTRATION) in sodium chloride 0 9% 250 mL 75 mcg/min Intravenous Rate/Dose Change Lendon Locket, RN     08/14/2021 2100 phenylephrine (COLE-SYNEPHRINE) 50 mg (STANDARD CONCENTRATION) in sodium chloride 0 9% 250 mL 65 mcg/min Intravenous Rate/Dose Change Lendon Locket, RN     08/14/2021 2030 phenylephrine (COLE-SYNEPHRINE) 50 mg (STANDARD CONCENTRATION) in sodium chloride 0 9% 250 mL 55 mcg/min Intravenous Rate/Dose Change Rebecca Lino RN     08/14/2021 2010 phenylephrine (COLE-SYNEPHRINE) 50 mg (STANDARD CONCENTRATION) in sodium chloride 0 9% 250 mL 45 mcg/min Intravenous Rate/Dose Change Rebecca Lino RN     08/14/2021 1937 phenylephrine (COLE-SYNEPHRINE) 50 mg (STANDARD CONCENTRATION) in sodium chloride 0 9% 250 mL 35 mcg/min Intravenous Rate/Dose Change Rebecca Lino RN     08/14/2021 1912 phenylephrine (COLE-SYNEPHRINE) 50 mg (STANDARD CONCENTRATION) in sodium chloride 0 9% 250 mL 25 mcg/min Intravenous Rate/Dose Change Rebecca Lino RN     08/14/2021 1806 phenylephrine (COLE-SYNEPHRINE) 50 mg (STANDARD CONCENTRATION) in sodium chloride 0 9% 250 mL 15 mcg/min Intravenous Rate/Dose Change Nicolle Nava RN     08/14/2021 1700 phenylephrine (COLE-SYNEPHRINE) 50 mg (STANDARD CONCENTRATION) in sodium chloride 0 9% 250 mL 5 mcg/min Intravenous 345 [facility], [address] [place] [address]     08/14/2021 1130 phenylephrine (COLE-SYNEPHRINE) 50 mg (STANDARD CONCENTRATION) in sodium chloride 0 9% 250 mL 0 mcg/min Intravenous Hold Nicolle Nava RN     08/15/2021 0520 hydrocortisone sodium succinate (PF) (Solu-CORTEF) injection 50 mg 50 mg Intravenous Given Rebecca iLno RN     08/14/2021 2105 hydrocortisone sodium succinate (PF) (Solu-CORTEF) injection 50 mg 50 mg Intravenous Given Rebecca Lino RN     08/14/2021 1438 hydrocortisone sodium succinate (PF) (Solu-CORTEF) injection 50 mg 50 mg Intravenous Given Nicolle Nava RN     08/14/2021 1849 albumin human (FLEXBUMIN) 25 % injection 25 g 0 g Intravenous Stopped Nicolle Nava RN     08/14/2021 1753 albumin human (FLEXBUMIN) 25 % injection 25 g 25 g Intravenous [address] [place] [address], [address] [place] [address]     08/14/2021 1300 albumin human (FLEXBUMIN) 25 % injection 25 g 0 g Intravenous Stopped Nicolle Nava RN     08/14/2021 1215 albumin human (FLEXBUMIN) 25 % injection 25 g 25 g Intravenous [address]     08/14/2021 4334 lidocaine (PF) (XYLOCAINE-MPF) 1 % injection **ADS Override Pull** 300 mg  Given by Other Yesenia Purdy RN     08/15/2021 0911 midodrine (PROAMATINE) tablet 10 mg 10 mg Oral Given Yesenia Purdy RN            Lab, Imaging and other studies: I have personally reviewed pertinent reports      VTE Pharmacologic Prophylaxis: eliquis  VTE Mechanical Prophylaxis: sequential compression device     Elijah Pena MD  8/15/2021,11:07 AM

## 2021-08-15 NOTE — PROGRESS NOTES
Random level of 16 5 this morning  Reduced 1000mg dose was then given  Will draw another random level tomorrow AM and continue pulse dosing

## 2021-08-16 ENCOUNTER — APPOINTMENT (INPATIENT)
Dept: RADIOLOGY | Facility: HOSPITAL | Age: 61
DRG: 871 | End: 2021-08-16
Payer: MEDICARE

## 2021-08-16 LAB
ALBUMIN SERPL BCP-MCNC: 2.9 G/DL (ref 3.5–5)
ALP SERPL-CCNC: 64 U/L (ref 46–116)
ALT SERPL W P-5'-P-CCNC: 14 U/L (ref 12–78)
ANION GAP SERPL CALCULATED.3IONS-SCNC: 4 MMOL/L (ref 4–13)
AST SERPL W P-5'-P-CCNC: 8 U/L (ref 5–45)
ATRIAL RATE: 95 BPM
BASOPHILS # BLD AUTO: 0.03 THOUSANDS/ΜL (ref 0–0.1)
BASOPHILS NFR BLD AUTO: 0 % (ref 0–1)
BILIRUB SERPL-MCNC: 0.22 MG/DL (ref 0.2–1)
BUN SERPL-MCNC: 36 MG/DL (ref 5–25)
CALCIUM ALBUM COR SERPL-MCNC: 9.1 MG/DL (ref 8.3–10.1)
CALCIUM SERPL-MCNC: 8.2 MG/DL (ref 8.3–10.1)
CHLORIDE SERPL-SCNC: 104 MMOL/L (ref 100–108)
CO2 SERPL-SCNC: 32 MMOL/L (ref 21–32)
CREAT SERPL-MCNC: 1.62 MG/DL (ref 0.6–1.3)
EOSINOPHIL # BLD AUTO: 1.07 THOUSAND/ΜL (ref 0–0.61)
EOSINOPHIL NFR BLD AUTO: 7 % (ref 0–6)
EOSINOPHIL NFR URNS MANUAL: 0 %
ERYTHROCYTE [DISTWIDTH] IN BLOOD BY AUTOMATED COUNT: 20.6 % (ref 11.6–15.1)
GFR SERPL CREATININE-BSD FRML MDRD: 45 ML/MIN/1.73SQ M
GLUCOSE SERPL-MCNC: 147 MG/DL (ref 65–140)
HCT VFR BLD AUTO: 31.2 % (ref 36.5–49.3)
HGB BLD-MCNC: 8.2 G/DL (ref 12–17)
IMM GRANULOCYTES # BLD AUTO: 0.15 THOUSAND/UL (ref 0–0.2)
IMM GRANULOCYTES NFR BLD AUTO: 1 % (ref 0–2)
LYMPHOCYTES # BLD AUTO: 0.45 THOUSANDS/ΜL (ref 0.6–4.47)
LYMPHOCYTES NFR BLD AUTO: 3 % (ref 14–44)
MAGNESIUM SERPL-MCNC: 2.2 MG/DL (ref 1.6–2.6)
MCH RBC QN AUTO: 21.8 PG (ref 26.8–34.3)
MCHC RBC AUTO-ENTMCNC: 26.3 G/DL (ref 31.4–37.4)
MCV RBC AUTO: 83 FL (ref 82–98)
MONOCYTES # BLD AUTO: 0.71 THOUSAND/ΜL (ref 0.17–1.22)
MONOCYTES NFR BLD AUTO: 5 % (ref 4–12)
NEUTROPHILS # BLD AUTO: 13.4 THOUSANDS/ΜL (ref 1.85–7.62)
NEUTS SEG NFR BLD AUTO: 84 % (ref 43–75)
NRBC BLD AUTO-RTO: 0 /100 WBCS
P AXIS: 46 DEGREES
PLATELET # BLD AUTO: 172 THOUSANDS/UL (ref 149–390)
PMV BLD AUTO: 10.9 FL (ref 8.9–12.7)
POTASSIUM SERPL-SCNC: 3 MMOL/L (ref 3.5–5.3)
PR INTERVAL: 168 MS
PROT SERPL-MCNC: 6.3 G/DL (ref 6.4–8.2)
QRS AXIS: -5 DEGREES
QRSD INTERVAL: 162 MS
QT INTERVAL: 412 MS
QTC INTERVAL: 517 MS
RBC # BLD AUTO: 3.76 MILLION/UL (ref 3.88–5.62)
SODIUM SERPL-SCNC: 140 MMOL/L (ref 136–145)
T WAVE AXIS: 121 DEGREES
VANCOMYCIN SERPL-MCNC: 17 UG/ML
VENTRICULAR RATE: 95 BPM
WBC # BLD AUTO: 15.81 THOUSAND/UL (ref 4.31–10.16)

## 2021-08-16 PROCEDURE — 83735 ASSAY OF MAGNESIUM: CPT | Performed by: FAMILY MEDICINE

## 2021-08-16 PROCEDURE — 85025 COMPLETE CBC W/AUTO DIFF WBC: CPT | Performed by: FAMILY MEDICINE

## 2021-08-16 PROCEDURE — 94760 N-INVAS EAR/PLS OXIMETRY 1: CPT

## 2021-08-16 PROCEDURE — 99232 SBSQ HOSP IP/OBS MODERATE 35: CPT | Performed by: INTERNAL MEDICINE

## 2021-08-16 PROCEDURE — 94660 CPAP INITIATION&MGMT: CPT

## 2021-08-16 PROCEDURE — 99232 SBSQ HOSP IP/OBS MODERATE 35: CPT | Performed by: PHYSICIAN ASSISTANT

## 2021-08-16 PROCEDURE — 80053 COMPREHEN METABOLIC PANEL: CPT | Performed by: FAMILY MEDICINE

## 2021-08-16 PROCEDURE — 71045 X-RAY EXAM CHEST 1 VIEW: CPT

## 2021-08-16 PROCEDURE — 99255 IP/OBS CONSLTJ NEW/EST HI 80: CPT | Performed by: INTERNAL MEDICINE

## 2021-08-16 PROCEDURE — 80202 ASSAY OF VANCOMYCIN: CPT | Performed by: FAMILY MEDICINE

## 2021-08-16 PROCEDURE — 93010 ELECTROCARDIOGRAM REPORT: CPT | Performed by: INTERNAL MEDICINE

## 2021-08-16 PROCEDURE — 99232 SBSQ HOSP IP/OBS MODERATE 35: CPT | Performed by: FAMILY MEDICINE

## 2021-08-16 RX ORDER — POTASSIUM CHLORIDE 20 MEQ/1
40 TABLET, EXTENDED RELEASE ORAL ONCE
Status: COMPLETED | OUTPATIENT
Start: 2021-08-16 | End: 2021-08-16

## 2021-08-16 RX ORDER — POTASSIUM CHLORIDE 14.9 MG/ML
20 INJECTION INTRAVENOUS ONCE
Status: COMPLETED | OUTPATIENT
Start: 2021-08-16 | End: 2021-08-16

## 2021-08-16 RX ORDER — BUMETANIDE 1 MG/1
1 TABLET ORAL 2 TIMES DAILY
Status: DISCONTINUED | OUTPATIENT
Start: 2021-08-16 | End: 2021-08-17

## 2021-08-16 RX ADMIN — BUMETANIDE 1 MG: 1 TABLET ORAL at 17:22

## 2021-08-16 RX ADMIN — POTASSIUM CHLORIDE 20 MEQ: 14.9 INJECTION, SOLUTION INTRAVENOUS at 06:20

## 2021-08-16 RX ADMIN — ACETAMINOPHEN 650 MG: 325 TABLET, FILM COATED ORAL at 21:33

## 2021-08-16 RX ADMIN — MIDODRINE HYDROCHLORIDE 10 MG: 5 TABLET ORAL at 06:02

## 2021-08-16 RX ADMIN — APIXABAN 2.5 MG: 2.5 TABLET, FILM COATED ORAL at 08:21

## 2021-08-16 RX ADMIN — MIDODRINE HYDROCHLORIDE 10 MG: 5 TABLET ORAL at 13:06

## 2021-08-16 RX ADMIN — DIPHENHYDRAMINE HYDROCHLORIDE 25 MG: 25 TABLET ORAL at 04:56

## 2021-08-16 RX ADMIN — MAGNESIUM OXIDE TAB 400 MG (241.3 MG ELEMENTAL MG) 400 MG: 400 (241.3 MG) TAB at 08:21

## 2021-08-16 RX ADMIN — SERTRALINE HYDROCHLORIDE 100 MG: 100 TABLET ORAL at 08:21

## 2021-08-16 RX ADMIN — HYDROCORTISONE SODIUM SUCCINATE 50 MG: 100 INJECTION, POWDER, FOR SOLUTION INTRAMUSCULAR; INTRAVENOUS at 21:36

## 2021-08-16 RX ADMIN — ATORVASTATIN CALCIUM 40 MG: 40 TABLET, FILM COATED ORAL at 16:42

## 2021-08-16 RX ADMIN — METOPROLOL TARTRATE 50 MG: 50 TABLET, FILM COATED ORAL at 09:50

## 2021-08-16 RX ADMIN — DIPHENHYDRAMINE HYDROCHLORIDE 25 MG: 25 TABLET ORAL at 21:34

## 2021-08-16 RX ADMIN — METOPROLOL TARTRATE 25 MG: 25 TABLET, FILM COATED ORAL at 16:42

## 2021-08-16 RX ADMIN — ASPIRIN 81 MG: 81 TABLET, COATED ORAL at 08:21

## 2021-08-16 RX ADMIN — Medication 1 CAPSULE: at 16:42

## 2021-08-16 RX ADMIN — POTASSIUM CHLORIDE 40 MEQ: 1500 TABLET, EXTENDED RELEASE ORAL at 09:50

## 2021-08-16 RX ADMIN — VANCOMYCIN HYDROCHLORIDE 1000 MG: 5 INJECTION, POWDER, LYOPHILIZED, FOR SOLUTION INTRAVENOUS at 13:04

## 2021-08-16 RX ADMIN — HYDROCORTISONE SODIUM SUCCINATE 50 MG: 100 INJECTION, POWDER, FOR SOLUTION INTRAMUSCULAR; INTRAVENOUS at 05:01

## 2021-08-16 RX ADMIN — MIDODRINE HYDROCHLORIDE 10 MG: 5 TABLET ORAL at 16:42

## 2021-08-16 RX ADMIN — ALLOPURINOL 100 MG: 100 TABLET ORAL at 08:21

## 2021-08-16 RX ADMIN — APIXABAN 2.5 MG: 2.5 TABLET, FILM COATED ORAL at 17:22

## 2021-08-16 RX ADMIN — ACETAMINOPHEN 650 MG: 325 TABLET, FILM COATED ORAL at 04:56

## 2021-08-16 RX ADMIN — PANTOPRAZOLE SODIUM 40 MG: 40 TABLET, DELAYED RELEASE ORAL at 16:42

## 2021-08-16 RX ADMIN — CEFEPIME HYDROCHLORIDE 2000 MG: 2 INJECTION, SOLUTION INTRAVENOUS at 01:39

## 2021-08-16 RX ADMIN — PANTOPRAZOLE SODIUM 40 MG: 40 TABLET, DELAYED RELEASE ORAL at 06:02

## 2021-08-16 RX ADMIN — HYDROCORTISONE SODIUM SUCCINATE 50 MG: 100 INJECTION, POWDER, FOR SOLUTION INTRAMUSCULAR; INTRAVENOUS at 13:06

## 2021-08-16 NOTE — CONSULTS
Consultation - Infectious Disease   Sarah Garcia 57 y o  male MRN: [id number]  Unit/Bed#:  Encounter: [id number]      Inpatient consult to Infectious Diseases  Consult performed by: Anisha Smith MD  Consult ordered by: Stephon Weathers MD            REQUIRED DOCUMENTATION:     1  This service was provided via Telemedicine  2  Provider located at [facility]  3  TeleMed provider: Anisha Smith MD   4  Identify all parties in room with patient during tele consult:RN  5  After connecting through [employer], patient was identified by name and date of birth and assistant checked wristband  Patient was then informed that this was a Telemedicine visit and that the exam was being conducted confidentially over secure lines  My office door was closed  No one else was in the room  Patient acknowledged consent and understanding of privacy and security of the Telemedicine visit, and gave us permission to have the assistant stay in the room in order to assist with the history and to conduct the exam   I informed the patient that I have reviewed their record in Epic and presented the opportunity for them to ask any questions regarding the visit today  The patient agreed to participate  Assessment/Recommendations     1  Sepsis, present on admission  - Source of sepsis is suspected aspiration pneumonia  - Has chronic hypotension now off pressors, clinically improved, afebrile but with persistent leukocytosis    · Management as below    2  Acute hypoxic and hypercarbic respiratory failure  - likely multifactorial, precipitated by pneumonia  - clinically improving     Continue supportive care, additional management as below    3   Possible aspiration pneumonia  - Diagnosis suspected in the setting of sepsis and bilateral upper lobe ground glass opacities and underlying risk factors for aspiration  - nasal mrsa screen positive    · Completed 5 days of Vancomycin, cefepime, recommend discontinuing antibiotics today and monitor clinical course  · Continue modified diet and additional aspiration precautions    4  Leukocytosis  - Possibly reactive in the setting of worsening rash , has chronic eosinophilia, low suspicion for DRESS  - Repeat CT imaging with no focus of infection, has small uncomplicated appearing pleural effusions and repeat blood cultures negative    · Discontinue current antibiotics and monitor for improvement in rash and white count    5  Drug rash  - Hx penicillin allergy  - Thought to be secondary to metronidazole as patient developed intense itching after flagyl infusion x 2  Also thought to be related to topical chlorhexidine  However rash has worsened, possibly reaction to cefepime v/s vancomycin    Discontinue antibiotics as above and monitor rash    6  Acute on chronic CHF, PATRICK, chronic hypotension  - Suspect pressor need is multifactorial related to tenuous volume status rather that ongoing sepsis    · Management per primary team    Thank you for involving me in the care of your patient  Will sign off  Please call with questions, change in clinical status or if tests recommended above are abnormal      Discussed with the primary service  History     Reason for Consult: Septic shock, leukocytosis  HPI: Bridgette Berkowitz is a 57y o  year old male with hx stroke, hx bioprosthetic aortic valve replacement in 2014 and hx covid pneumonia in March with prolonged hospitalization  He is a long-term resident of a nursing home  He presented to the ER on 08/11 with acute onset shortness of breath  In the ER, vitals were notable for tachycardia, tachypnea, hypoxia   Labs were notable for absence of leukocytosis and elevated creatinine  EKG noted no acute ST changes  CXRand CT chest was concerning for pneumonia  He was admitted and started on cefepime and vancomycin and flagyl  Blood cultures are negative   3 days ago developed itching and rash over trunk and arms right after flagyl infusion, flagyl was stopped but rash progressed  He had a fever and leukocytosis on 8/14, fever has resolved  White count had increased yesterday and is improved today  He reports feeling well, has no cough or dyspnea but rash has worsened over torso and extremities and is very itchy  Denies nausea, vomiting, diarrhea or rash and is tolerating antibiotics well  Infectious disease is being consulted for diagnostic work up and antibiotic management  Review of Systems  Pertinent positives and negatives as noted in HPI  Rest complete 12 point system-based review of systems is otherwise negative  PAST MEDICAL HISTORY:  Past Medical History:   Diagnosis Date    Allergic rhinitis     last assessed 9/12/12    Finger fracture, right     Closed fx of the middle phalanx of the right 5th finger  last assessed 1/30/14    GI bleed 2/22/2019    Hemorrhoids     last assessed 2/10/14    Hyperlipidemia     Hypertension     Stroke [facility])      Past Surgical History:   Procedure Laterality Date    AORTIC VALVE REPLACEMENT  07/30/2014    AVR with 25mm [facility] Ease bovine pericardial valve    CARDIAC CATHETERIZATION  07/18/2014    SLB left main-normal, circumflex-normal, ramus intermedius-normal, RCA was large and dominant giving rise to the PDA & a large posterolateral branch  No disease  last assessed 8/19/14     COLONOSCOPY N/A 2/25/2019    Procedure: COLONOSCOPY;  Surgeon: Beata Davidson DO;  Location: [facility]; Service: Gastroenterology    ESOPHAGOGASTRODUODENOSCOPY N/A 2/22/2019    Procedure: ESOPHAGOGASTRODUODENOSCOPY (EGD)-roadshow overnight;  Surgeon: Beata Davidson DO;  [facility]: [facility]; Service: Gastroenterology    ESOPHAGOGASTRODUODENOSCOPY N/A 2/23/2019    Procedure: ESOPHAGOGASTRODUODENOSCOPY (EGD); Surgeon: Marko Paige MD;  [facility]; Service: Gastroenterology    ESOPHAGOGASTRODUODENOSCOPY N/A 2/25/2019    Procedure: ESOPHAGOGASTRODUODENOSCOPY (EGD);   Surgeon: Beata Davidson DO; [facility]: [facility]; Service: Gastroenterology    IR NON-TUNNELED CENTRAL LINE PLACEMENT  3/1/2019    IR NON-TUNNELED CENTRAL LINE PLACEMENT  2019    IR TUNNELED DIALYSIS CATHETER CHECK/CHANGE/REPOSITION/ANGIOPLASTY  2019    IR TUNNELED DIALYSIS CATHETER PLACEMENT  2019    IR TUNNELED DIALYSIS CATHETER PLACEMENT  2019    KNEE ARTHROSCOPY      KNEE CARTILAGE SURGERY Left     medial meniscus tear     TESTICLE SURGERY      TONSILLECTOMY AND ADENOIDECTOMY      TOOTH EXTRACTION         FAMILY HISTORY:  Non-contributory    SOCIAL HISTORY:  Social History   /Civil Union  Social History     Substance and Sexual Activity   Alcohol Use Never    Comment: ocassion /never drank alcohol per [employer]      Social History     Substance and Sexual Activity   Drug Use No     Social History     Tobacco Use   Smoking Status Never Smoker   Smokeless Tobacco Never Used       ALLERGIES:  Allergies   Allergen Reactions    Amiodarone      Developed Rash    Flagyl [Metronidazole] Itching     Patient had 2 episodes of itching and redness with flagyl     Penicillins Rash     However, has subsequently tolerated Cefazolin and Cefepime       MEDICATIONS:  All current active medications have been reviewed      Physical Exam     Temp:  [96 2 °F (35 7 °C)-98 1 °F (36 7 °C)] 96 9 °F (36 1 °C)  HR:  [60-83] 61  Resp:  [14-47] 14  BP: ()/(45-71) 105/54  SpO2:  [92 %-99 %] 98 %  Temp (24hrs), Av 4 °F (36 3 °C), Min:96 2 °F (35 7 °C), Max:98 1 °F (36 7 °C)  Current: Temperature: (!) 96 9 °F (36 1 °C)    Intake/Output Summary (Last 24 hours) at 2021 0926  Last data filed at 2021 6721  Gross per 24 hour   Intake 1149 65 ml   Output 975 ml   Net 174 65 ml         Physical exam findings reported by bedside and primary medical team staff    General Appearance:  Appearing well, nontoxic, and in no distress, appears stated age   Head:  Normocephalic, without obvious abnormality, atraumatic   Eyes: PERRL, conjunctiva pink and sclera anicteric, both eyes   Nose: Nares normal, mucosa normal, no drainage   Throat: Oropharynx moist without lesions; lips, mucosa, and tongue normal; teeth and gums normal   Neck: Supple, symmetrical, trachea midline, no adenopathy, no tenderness/mass/nodules   Back:   Symmetric, no curvature, ROM normal, no CVA tenderness   Lungs:   Clear to auscultation bilaterally, no audible wheezes, rhonchi and rales, respirations unlabored   Chest Wall:  No tenderness or deformity   Heart:  Regular rate and rhythm, S1, S2 normal, no murmur, rub or gallop   Abdomen:   Soft, non-tender, non-distended, positive bowel sounds, no masses, no organomegaly    No CVA tenderness   Extremities: Extremities normal, atraumatic, no cyanosis, clubbing or edema   Skin: Diffuse maculopapular rash over trunk, back and all extremities   Lymph nodes: Cervical, supraclavicular, and axillary nodes normal   Neurologic: Alert and oriented times 3, extremity strength 5/5 and symmetric       Invasive Devices:   CVC Central Lines 08/14/21 Right Internal jugular (Active)   Reasons to continue CVC line  Medications requiring central line 08/15/21 1548   Goal for Removal D/C when meds requiring line finished 08/15/21 1548   Line Necessity Reviewed Yes, reviewed with provider 08/15/21 1548   Site Assessment Clean;Dry; Intact 08/16/21 0529   Proximal Lumen Status Capped 08/16/21 0529   Medial Lumen Status Flushed;Blood return noted;Normal saline locked 08/16/21 0529   Distal Lumen Status Flushed;Blood return noted;Normal saline locked 08/16/21 0529   Dressing Type Chlorhexidine dressing;Transparent 08/16/21 0529   Dressing Status Clean;Dry; Intact 08/16/21 0529   Dressing Intervention Dressing changed 08/14/21 1700   Dressing Change Due 08/22/21 08/14/21 1700       Peripheral IV 08/13/21 Right Antecubital (Active)   Site Assessment Positional 08/16/21 0529   Dressing Type Transparent;Gauze 08/16/21 0529   Line Status Flushed;Saline locked 08/16/21 0529   Dressing Status Old drainage; Intact 08/16/21 0529       Peripheral IV 08/16/21 Left Hand (Active)   Site Assessment Clean;Dry; Intact 08/16/21 0529   Dressing Type Transparent;Securing device;Gauze 08/16/21 0529   Line Status Flushed;Blood return noted; Saline locked 08/16/21 0529   Dressing Status Clean;Dry; Intact 08/16/21 0529       Labs, Imaging, & Other Studies     Lab Results:    I have personally reviewed pertinent labs  Results from last 7 days   Lab Units 08/16/21  0506 08/15/21  0509 08/14/21  0431   WBC Thousand/uL 15 81* 18 65* 15 76*   HEMOGLOBIN g/dL 8 2* 8 3* 10 4*   PLATELETS Thousands/uL 172 191 203     Results from last 7 days   Lab Units 08/16/21  0506 08/14/21  0431 08/13/21  0558 08/11/21  1515   POTASSIUM mmol/L 3 0* 2 8* 3 5  --    CHLORIDE mmol/L 104 99* 101  --    CO2 mmol/L 32 35* 37*  --    CO2, I-STAT mmol/L  --   --   --  >45*   BUN mg/dL 36* 34* 35*  --    CREATININE mg/dL 1 62* 1 55* 1 58*  --    EGFR ml/min/1 73sq m 45 47 46  --    GLUCOSE, ISTAT mg/dl  --   --   --  101   CALCIUM mg/dL 8 2* 8 4 8 2*  --    AST U/L 8 12 12  --    ALT U/L 14 14 13  --    ALK PHOS U/L 64 81 78  --      Results from last 7 days   Lab Units 08/14/21  1002 08/13/21  1323 08/11/21  1749 08/11/21  1543   BLOOD CULTURE  No Growth at 24 hrs  No Growth at 24 hrs   --   --   --    URINE CULTURE   --  No Growth <1000 cfu/mL  --   --    MRSA CULTURE ONLY   --   --   --  Methicillin Resistant Staphylococcus aureus isolated*  This patient requires contact isolation precautions per [place] law  Contact precautions are not required in [place] for nasal surveillance cultures  LEGIONELLA URINARY ANTIGEN   --   --  Negative  --        Imaging Studies:   I have personally reviewed pertinent imaging study reports and images in PACS  EKG, Pathology, and Other Studies:   I have personally reviewed pertinent reports  Counseling/Coordination of care:        Total 70 minutes communication with the patient via telehealth  Labs, medical tests and imaging studies were independently and extensively reviewed by me as noted above in HPI and old records were obtained and summarized as noted above in HPI  My recommendations were discussed with the patient in detail who verbalized understanding

## 2021-08-16 NOTE — PROGRESS NOTES
Progress Note - Nephrology   Min Blackmon 57 y o  male MRN: [id number]  Unit/Bed#:  Encounter: [id number]    Elisabet Bergmna is a 57 y o  M with HTN, HLD, hx CVA admitted to [address] [place] [address] ICU on 08/11/2021 for acute hypoxic and hypercarbic respiratory failure likely related to obesity hypoventilation syndrome and underlying pneumonia, as well as congestive heart failure  Nephrology was consulted for acute kidney injury with creatinine increased to 1 71 mg/dL on 08/14/2021, increased from creatinine 1 45 mg/dL on presentation  Assessment and Plan    Summary:  Challenging management due to comorbidities  Renal function stable around creatinine of 1 7 mg/dL  Volume overload is worsening  Diuresis will be challenging due to hypotension, decreased dose of metoprolol may assist   Restart home bumetanide 1 mg p o  twice daily  Acute kidney injury superimposed on chronic kidney disease  · With baseline creatinine about 1 19-1 3 with presenting creatinine 1 45 mg/dL on 08/11/2021, with peak creatinine 1 71 mg/dL on 08/14/2021, stable to improved at 1 62 mg/dL on 08/16/2021  · No evidence of obstruction on CT of chest abdomen and pelvis without IV contrast on 08/15/2021  · Patient has received no appreciable nephrotoxins, such as IV contrast   · Vancomycin trough is acceptable, in therapy has been completed today  · Nonoliguric with 1150 mL urine output yesterday  · Fractional excretion of urea is elevated at 41 16%, consistent with acute tubular necrosis, likely secondary to septic shock  · Urine eosinophils negative on 08/15/2021  · Continue to provide supportive care  Optimize hemodynamics  Maintain mean arterial pressure greater than 60 mmHg  Please discuss with renal any diuretics or possible nephrotoxic agents, such as NSAIDs or IV contrast   Recommend avoidance of PPIs in favor of H2 blocker, if possible  Periodically monitor for urinary retention with bladder scan     Chronic kidney disease, stage 3A with baseline creatinine 1 19 to 1 3 mg/dL  · Follows with a nephrologist in the [facility] area  History of dialysis dependence in 2019, per 2/12/21 nephrology note  Proteinuria  · Protein to creatinine ratio 1 8 on 08/15/2021  Would hold Ace or Arb due to current acute kidney injury  Further workup should be obtained in the outpatient setting, if not already performed  Defer to patient's primary nephrologist   Hypokalemia  · Potassium 3 0 millimole per L with sufficient magnesium of 2 2 mg/dL on supplementation with magnesium oxide 400 mg daily on 08/16/2021  Replete potassium, per primary  Volume overload  · Weight is increased about 16 lb since admission  Assume diuresis as above  Acute on chronic diastolic congestive heart failure  · Continue 2 g sodium restriction and 2 L fluid restriction  Add daily weights and I&Os  Resume diuresis as above  Hypotension, chronic  · On home midodrine 10 mg 3 times daily, which is continued  · Follow with reduced dose of metoprolol, 08/06/2021  Monitor with resumption of diuresis  Septic shock likely secondary to acute cystitis a multifocal pneumonia  · On cefepime and vancomycin, per primary  Paroxysmal atrial fibrillation  · On home diltiazem 180 mg daily, metoprolol 50 mg 3 times daily  · Metoprolol tartrate has been decreased from 50 mg 3 times daily to twice daily today, 08/16/2021  Renal cysts  · Exophytic left upper pole renal cyst additional exophytic cyst arising from the lower pole of the right kidney seen on CT of chest abdomen pelvis without contrast on 08/15/2021  · Recommend outpatient ultrasound in 6 months to monitor for stability if clinically indicated      Follow up reason for today's visit:  Acute kidney injury superimposed on chronic kidney disease, stage IIIA / electrolyte abnormalities / congestive heart failure    Acute on chronic respiratory failure with hypoxia and hypercapnia [facility])    Patient Active Problem List   Diagnosis    Aortic stenosis, mild    Essential hypertension    Hyperlipidemia    Left bundle branch block    Hypokalemia    Murmur    Osteoarthritis of both knees    Pericardial disease    Sciatica    Age-related cataract of right eye    Venous insufficiency    Bilateral leg edema    Stress-induced cardiomyopathy    History of aortic valve replacement with bioprosthetic valve    Persistent atrial fibrillation (HCC)    Septic shock (HCC)    Leukocytosis    Metabolic encephalopathy    Drug rash    Coagulopathy (HCC)    Age-related nuclear cataract, bilateral    Open angle with borderline findings, low risk, bilateral    Presence of intraocular lens    Vitreous degeneration of both eyes    Left arm swelling    Dysphagia    Left internal jugular vein thrombus    Morbid obesity     Depression    Gastric ulcer    Paroxysmal atrial fibrillation (HCC)    COVID-19    Acute kidney injury superimposed on CKD (HCC)    Chronic anemia    Hypernatremia    Seizure (HCC)    History of stroke    Acute kidney injury superimposed on chronic kidney disease ([facility] [address] )    H/O heart valve replacement with tissue graft    Lower extremity edema    Acute on chronic respiratory failure with hypoxia and hypercapnia (HCC)    Obesity hypoventilation syndrome (HCC)    Acute on chronic diastolic (congestive) heart failure (HCC)    Multifocal pneumonia    Acute cystitis with hematuria         Subjective:   Patient asks why he cannot have Gatorade  I provided assistance to dial out to call a family member per patient request   He denies chest pain or shortness of breath  A complete 10 point review of systems was performed and is otherwise negative  Objective:     Vitals: Blood pressure 114/55, pulse 69, temperature (!) 96 9 °F (36 1 °C), temperature source Tympanic, resp  rate 14, height 5' 11" (1 803 m), weight (!) 162 kg (357 lb 5 9 oz), SpO2 95 %  ,Body mass index is 49 84 kg/m²      Weight (last 2 days)     Date/Time Weight    08/16/21 0549   (!) 162 (357 37)    08/15/21 0520   (!) 159 (349 65)    08/14/21 0445   (!) 157 (345 68)                Intake/Output Summary (Last 24 hours) at 8/16/2021 1153  Last data filed at 8/16/2021 0627  Gross per 24 hour   Intake 1116 65 ml   Output 975 ml   Net 141 65 ml     I/O last 3 completed shifts: In: 2442 [P O :1800; I V :442; IV Piggyback:200]  Out: 0601 [Urine:1825]         Physical Exam: /55   Pulse 69   Temp (!) 96 9 °F (36 1 °C) (Tympanic)   Resp 14   Ht 5' 11" (1 803 m)   Wt (!) 162 kg (357 lb 5 9 oz)   SpO2 95%   BMI 49 84 kg/m²     General Appearance:    Alert, cooperative, no distress, appears stated age, morbidly obese   Head:    Normocephalic, without obvious abnormality, atraumatic   Eyes:    Conjunctiva/corneas clear   Ears:    Normal external ears   Nose:   Nasal cannula, Nares normal, septum midline, mucosa normal, no drainage  or sinus tenderness   Throat:   Lips, mucosa, and tongue normal; teeth and gums normal   Neck:   Supple, symmetrical   Back:     Symmetric, no curvature, ROM normal, no CVA tenderness   Lungs:    Decreased lung sounds, respirations unlabored   Chest wall:    No tenderness or deformity   Heart:    Regular rate and rhythm, S1 and S2 normal, +murmur, rub   or gallop   Abdomen:     Soft, non-tender, bowel sounds active   Extremities:   Extremities normal, atraumatic, no cyanosis, 3+ lower extremity pitting edema to his waist   Skin:   Skin color, texture, turgor normal, no rashes or lesions   Lymph nodes:   Cervical normal   Neurologic:   CNII-XII intact            Lab, Imaging and other studies: I have personally reviewed pertinent labs    CBC:   Lab Results   Component Value Date    WBC 15 81 (H) 08/16/2021    HGB 8 2 (L) 08/16/2021    HCT 31 2 (L) 08/16/2021    MCV 83 08/16/2021     08/16/2021    MCH 21 8 (L) 08/16/2021    MCHC 26 3 (L) 08/16/2021    RDW 20 6 (H) 08/16/2021    MPV 10 9 08/16/2021    NRBC 0 08/16/2021     CMP:   Lab Results   Component Value Date    K 3 0 (L) 08/16/2021     08/16/2021    CO2 32 08/16/2021    BUN 36 (H) 08/16/2021    CREATININE 1 62 (H) 08/16/2021    CALCIUM 8 2 (L) 08/16/2021    AST 8 08/16/2021    ALT 14 08/16/2021    ALKPHOS 64 08/16/2021    EGFR 45 08/16/2021         Results from last 7 days   Lab Units 08/16/21  0506 08/15/21  0509 08/14/21  1340 08/14/21  0431 08/13/21  0558 08/11/21  1515   POTASSIUM mmol/L 3 0* 3 5 3 5 2 8* 3 5  --    CHLORIDE mmol/L 104 104 100 99* 101  --    CO2 mmol/L 32 34* 34* 35* 37*  --    CO2, I-STAT mmol/L  --   --   --   --   --  >45*   BUN mg/dL 36* 38* 39* 34* 35*  --    CREATININE mg/dL 1 62* 1 66* 1 71* 1 55* 1 58*  --    CALCIUM mg/dL 8 2* 8 2* 8 0* 8 4 8 2*  --    ALK PHOS U/L 64  --   --  81 78  --    ALT U/L 14  --   --  14 13  --    AST U/L 8  --   --  12 12  --    GLUCOSE, ISTAT mg/dl  --   --   --   --   --  101         Phosphorus: No results found for: PHOS  Magnesium:   Lab Results   Component Value Date    MG 2 2 08/16/2021     Urinalysis: No results found for: COLORU, CLARITYU, SPECGRAV, PHUR, LEUKOCYTESUR, NITRITE, PROTEINUA, GLUCOSEU, KETONESU, BILIRUBINUR, BLOODU  Ionized Calcium: No results found for: CAION  Coagulation: No results found for: PT, INR, APTT  Troponin: No results found for: TROPONINI  ABG: No results found for: PHART, CZI1ZLY, PO2ART, TXE1GAK, K1DRWICB, BEART, SOURCE  Radiology review:     IMAGING  Procedure: CT chest abdomen pelvis wo contrast    Result Date: 8/15/2021  Narrative: CT CHEST, ABDOMEN AND PELVIS WITHOUT IV CONTRAST INDICATION:   septic shock , no source other than pneumonia which is being treated with vanco/cefepim  COMPARISON:  CT chest from August 11, 2021 and CTA of the abdomen and pelvis from February 22, 2019   TECHNIQUE: CT examination of the chest, abdomen and pelvis was performed without intravenous contrast   Axial, sagittal, and coronal 2D reformatted images were created from the source data and submitted for interpretation  Radiation dose length product (DLP) for this visit:  4101 mGy-cm   This examination, like all CT scans performed in the [facility], was performed utilizing techniques to minimize radiation dose exposure, including the use of iterative reconstruction and automated exposure control  Enteric contrast was not administered  FINDINGS: CHEST LUNGS:  Mild respiratory motion and prominence of the interstitial markings with small bilateral pleural effusions and persistent areas of subsegmental atelectasis at the right base  Mild groundglass opacities in the upper lobes  No new dense consolidations  PLEURA:  Small pleural effusions  HEART/GREAT VESSELS:  Median sternotomy, cardiomegaly, coronary vascular calcifications, aortic valve replacement  No pericardial effusion  MEDIASTINUM AND JAMAR:  No mediastinal or hilar adenopathy  CHEST WALL AND LOWER NECK:   Unremarkable  ABDOMEN LIVER/BILIARY TREE:  Stable cyst within the quadrate lobe  GALLBLADDER:  No calcified gallstones  No pericholecystic inflammatory change  SPLEEN:  Unremarkable  PANCREAS:  Unremarkable  ADRENAL GLANDS:  Unremarkable  KIDNEYS/URETERS:  Exophytic left upper pole renal cyst   Additional exophytic cyst arising from the lower pole of the right kidney  No obstructive uropathy, perinephric fluid collection or perinephric inflammatory changes  STOMACH AND BOWEL:  Fluid-filled sigmoid colon with moderate distention of which the maximal transverse diameter is approximately 9 9 cm  No findings to suggest obstruction  APPENDIX:  No findings to suggest appendicitis  ABDOMINOPELVIC CAVITY:  No ascites  No pneumoperitoneum  No lymphadenopathy  VESSELS:  Unremarkable for patient's age  Stable asymmetric prominent collateral vessel along the right lateral chest wall extending from the axillary region to the right flank  Mild anasarca and nonspecific soft tissue inflammatory changes in the left  lateral thigh    Similar changes noted in the right flank region without a discrete fluid collection  Portions of the right lateral abdominal wall are not imaged given the patient's body habitus and the prescribed field of view  PELVIS REPRODUCTIVE ORGANS:  Unremarkable for patient's age  URINARY BLADDER:  Unremarkable  ABDOMINAL WALL/INGUINAL REGIONS:  Unremarkable  OSSEOUS STRUCTURES:  No acute fracture or destructive osseous lesion  Impression: Cardiomegaly with mild interstitial and pulmonary edema and small bilateral pleural effusions with stable right basilar subsegmental atelectasis  Distended sigmoid colon with an air-fluid level  Correlate for diarrheal illness  No evidence of bowel obstruction  Mild anasarca with scattered areas of mild inflammatory change in the subcutaneous soft tissues of the right lateral abdominal wall and left proximal thigh  Stable hepatic and renal cysts  Workstation performed: [id number]     Procedure: XR chest portable    Result Date: 8/15/2021  Narrative: CHEST INDICATION:   central line insertion  COMPARISON:  Chest radiograph and CT from 8/11/2021  EXAM PERFORMED/VIEWS:  XR CHEST PORTABLE FINDINGS:  Central line in the right supraclavicular region, coiled in the right neck with the tip pointing cephalad  Mild cardiomegaly  Median sternotomy  Mild pulmonary edema  Trace effusions  No pneumothorax  Osseous structures appear within normal limits for patient age  Impression: Malpositioned central catheter in the right supraclavicular region  Dr Rhonda Contreras is aware of this per his procedure note on 8/14/2021  Mild pulmonary edema with trace effusions  No pneumothorax   Workstation performed: [id number]       Current Facility-Administered Medications   Medication Dose Route Frequency    acetaminophen (TYLENOL) tablet 650 mg  650 mg Oral Q6H PRN    albuterol inhalation solution 2 5 mg  2 5 mg Nebulization Q4H PRN    allopurinol (ZYLOPRIM) tablet 100 mg  100 mg Oral Daily    apixaban (ELIQUIS) tablet 2 5 mg  2 5 mg Oral BID    aspirin (ECOTRIN LOW STRENGTH) EC tablet 81 mg  81 mg Oral Daily    atorvastatin (LIPITOR) tablet 40 mg  40 mg Oral Daily With Dinner    b complex-vitamin C-folic acid (NEPHROCAPS) capsule 1 capsule  1 capsule Oral Daily With Dinner    cefepime (MAXIPIME) IVPB (premix in dextrose) 2,000 mg 50 mL  2,000 mg Intravenous Q12H    diphenhydrAMINE (BENADRYL) tablet 25 mg  25 mg Oral Q6H PRN    hydrocortisone sodium succinate (PF) (Solu-CORTEF) injection 50 mg  50 mg Intravenous Q8H [address]    magnesium oxide (MAG-OX) tablet 400 mg  400 mg Oral Daily    metoprolol tartrate (LOPRESSOR) tablet 25 mg  25 mg Oral TID    midodrine (PROAMATINE) tablet 10 mg  10 mg Oral TID AC    oxyCODONE (ROXICODONE) IR tablet 5 mg  5 mg Oral Q4H PRN    pantoprazole (PROTONIX) EC tablet 40 mg  40 mg Oral BID AC    phenylephrine (COLE-SYNEPHRINE) 50 mg (STANDARD CONCENTRATION) in sodium chloride 0 9% 250 mL   mcg/min Intravenous Titrated    sertraline (ZOLOFT) tablet 100 mg  100 mg Oral Daily    vancomycin (VANCOCIN) 1,000 mg in sodium chloride 0 9 % 250 mL IVPB  1,000 mg Intravenous Daily     Medications Discontinued During This Encounter   Medication Reason    metroNIDAZOLE (FLAGYL) IVPB (premix) 500 mg 100 mL     bumetanide (BUMEX) injection 1 mg     vancomycin (VANCOCIN) 1,500 mg in sodium chloride 0 9 % 500 mL IVPB     diltiazem (CARDIZEM CD) 24 hr capsule 180 mg     vancomycin (VANCOCIN) 2,250 mg in sodium chloride 0 9 % 500 mL IVPB     metoprolol tartrate (LOPRESSOR) tablet 50 mg     vancomycin (VANCOCIN) 1,000 mg in sodium chloride 0 9 % 250 mL IVPB        La Chisholm PA-C    Portions of the record may have been created with voice recognition software  Occasional wrong word or "sound a like" substitutions may have occurred due to the inherent limitations of voice recognition software  Read the chart carefully and recognize, using context, where substitutions have occurred

## 2021-08-16 NOTE — ASSESSMENT & PLAN NOTE
Continue to hold cardizem due to hypotension  Continue metoprolol   Eliquis for [facility]

## 2021-08-16 NOTE — ASSESSMENT & PLAN NOTE
Chronic hypotension previously on midodrine home regimen, possible septic shock now resolved  Patient was noted for worsening leukocytosis, new fever and worsening hypotension   Lactic acid normal  Repeat Blood cultures obtained x 2 8/14/21 no growth to date  CT chest / abdomen / pelvis reviewed   The patient completed 5 days of antibiotic therapy for possible pneumonia, discussed with ID, input appreciated, will discontinue antibiotics and observe  Central line placed by surgical team    Midodrine restarted, weaned off Neosynepherine

## 2021-08-16 NOTE — ASSESSMENT & PLAN NOTE
Lab Results   Component Value Date    EGFR 45 08/16/2021    EGFR 44 08/15/2021    EGFR 42 08/14/2021    CREATININE 1 62 (H) 08/16/2021    CREATININE 1 66 (H) 08/15/2021    CREATININE 1 71 (H) 08/14/2021     Baseline creatinine 1 1-1 4  Creatinine slightly above baseline and stable along 1 6 today  Monitor renal function closely with diuresis

## 2021-08-16 NOTE — ASSESSMENT & PLAN NOTE
Without fever or wbc count , it's unclear if findings are acute or sequale of recent covid infection   Possible aspiration vs  HCAP vs  Gram negative pna  Received 5 days therapy of vanco/cefepime - discussed with ID, will discontinue especially with worsening rash, suspected drug reaction  Procalcitonin, urine Legionella and pneumococcal antigens negative  Flagyl discontinued due to suspected allergy, allergy added

## 2021-08-16 NOTE — PLAN OF CARE
Problem: Potential for Falls  Goal: Patient will remain free of falls  Description: INTERVENTIONS:  - Educate patient/family on patient safety including physical limitations  - Instruct patient to call for assistance with activity   - Consult OT/PT to assist with strengthening/mobility   - Keep Call bell within reach  - Keep bed low and locked with side rails adjusted as appropriate  - Keep care items and personal belongings within reach  - Initiate and maintain comfort rounds  - Make Fall Risk Sign visible to staff  - Offer Toileting every 2 Hours, in advance of need  - Initiate/Maintain bed alarm  - Apply yellow socks and bracelet for high fall risk patients  - Consider moving patient to room near nurses station  Outcome: Progressing     Problem: MOBILITY - ADULT  Goal: Maintain or return to baseline ADL function  Description: INTERVENTIONS:  -  Assess patient's ability to carry out ADLs; assess patient's baseline for ADL function and identify physical deficits which impact ability to perform ADLs (bathing, care of mouth/teeth, toileting, grooming, dressing, etc )  - Assess/evaluate cause of self-care deficits   - Assess range of motion  - Assess patient's mobility; develop plan if impaired  - Assess patient's need for assistive devices and provide as appropriate  - Encourage maximum independence but intervene and supervise when necessary  - Involve family in performance of ADLs  - Assess for home care needs following discharge   - Consider OT consult to assist with ADL evaluation and planning for discharge  - Provide patient education as appropriate  Outcome: Progressing  Goal: Maintains/Returns to pre admission functional level  Description: INTERVENTIONS:  - Perform BMAT or MOVE assessment daily    - Set and communicate daily mobility goal to care team and patient/family/caregiver  - Collaborate with rehabilitation services on mobility goals if consulted  - Perform Range of Motion 3 times a day    - Reposition patient every 2 hours    - Dangle patient as medically able  - Record patient progress and toleration of activity level   Outcome: Progressing     Problem: PAIN - ADULT  Goal: Verbalizes/displays adequate comfort level or baseline comfort level  Description: Interventions:  - Encourage patient to monitor pain and request assistance  - Assess pain using appropriate pain scale  - Administer analgesics based on type and severity of pain and evaluate response  - Implement non-pharmacological measures as appropriate and evaluate response  - Consider cultural and social influences on pain and pain management  - Notify physician/advanced practitioner if interventions unsuccessful or patient reports new pain  Outcome: Progressing     Problem: INFECTION - ADULT  Goal: Absence or prevention of progression during hospitalization  Description: INTERVENTIONS:  - Assess and monitor for signs and symptoms of infection  - Monitor lab/diagnostic results  - Monitor all insertion sites, i e  indwelling lines, tubes, and drains  - Administer medications as ordered  - Instruct and encourage patient and family to use good hand hygiene technique  - Identify and instruct in appropriate isolation precautions for identified infection/condition  Outcome: Progressing     Problem: SAFETY ADULT  Goal: Patient will remain free of falls  Description: INTERVENTIONS:  - Educate patient/family on patient safety including physical limitations  - Instruct patient to call for assistance with activity   - Consult OT/PT to assist with strengthening/mobility   - Keep Call bell within reach  - Keep bed low and locked with side rails adjusted as appropriate  - Keep care items and personal belongings within reach  - Initiate and maintain comfort rounds  - Make Fall Risk Sign visible to staff  - Offer Toileting every 2 Hours, in advance of need  - Initiate/Maintain bed alarm  - Apply yellow socks and bracelet for high fall risk patients  - Consider moving patient to room near nurses station  Outcome: Progressing  Goal: Maintain or return to baseline ADL function  Description: INTERVENTIONS:  -  Assess patient's ability to carry out ADLs; assess patient's baseline for ADL function and identify physical deficits which impact ability to perform ADLs (bathing, care of mouth/teeth, toileting, grooming, dressing, etc )  - Assess/evaluate cause of self-care deficits   - Assess range of motion  - Assess patient's mobility; develop plan if impaired  - Assess patient's need for assistive devices and provide as appropriate  - Encourage maximum independence but intervene and supervise when necessary  - Involve family in performance of ADLs  - Assess for home care needs following discharge   - Consider OT consult to assist with ADL evaluation and planning for discharge  - Provide patient education as appropriate  Outcome: Progressing  Goal: Maintains/Returns to pre admission functional level  Description: INTERVENTIONS:  - Perform BMAT or MOVE assessment daily    - Set and communicate daily mobility goal to care team and patient/family/caregiver  - Collaborate with rehabilitation services on mobility goals if consulted  - Perform Range of Motion 3 times a day  - Reposition patient every 2 hours    - Dangle patient as medically able  - Record patient progress and toleration of activity level   Outcome: Progressing     Problem: DISCHARGE PLANNING  Goal: Discharge to home or other facility with appropriate resources  Description: INTERVENTIONS:  - Identify barriers to discharge w/patient and caregiver  - Arrange for needed discharge resources and transportation as appropriate  - Identify discharge learning needs (meds, wound care, etc )  - Refer to Case Management Department for coordinating discharge planning if the patient needs post-hospital services based on physician/advanced practitioner order or complex needs related to functional status, cognitive ability, or social support system  Outcome: Progressing     Problem: Knowledge Deficit  Goal: Patient/family/caregiver demonstrates understanding of disease process, treatment plan, medications, and discharge instructions  Description: Complete learning assessment and assess knowledge base    Interventions:  - Provide teaching at level of understanding  - Provide teaching via preferred learning methods  Outcome: Progressing     Problem: CARDIOVASCULAR - ADULT  Goal: Maintains optimal cardiac output and hemodynamic stability  Description: INTERVENTIONS:  - Monitor I/O, vital signs and rhythm  - Monitor for S/S and trends of decreased cardiac output  - Administer and titrate ordered vasoactive medications to optimize hemodynamic stability  - Assess quality of pulses, skin color and temperature  - Assess for signs of decreased coronary artery perfusion  - Instruct patient to report change in severity of symptoms  Outcome: Progressing  Goal: Absence of cardiac dysrhythmias or at baseline rhythm  Description: INTERVENTIONS:  - Continuous cardiac monitoring, vital signs, obtain 12 lead EKG if ordered  - Administer antiarrhythmic and heart rate control medications as ordered  - Monitor electrolytes and administer replacement therapy as ordered  Outcome: Progressing     Problem: RESPIRATORY - ADULT  Goal: Achieves optimal ventilation and oxygenation  Description: INTERVENTIONS:  - Assess for changes in respiratory status  - Assess for changes in mentation and behavior  - Position to facilitate oxygenation and minimize respiratory effort  - Oxygen administered by appropriate delivery if ordered  - Initiate smoking cessation education as indicated  - Encourage broncho-pulmonary hygiene including cough, deep breathe, Incentive Spirometry  - Assess the need for suctioning and aspirate as needed  - Assess and instruct to report SOB or any respiratory difficulty  - Respiratory Therapy support as indicated  Outcome: Progressing     Problem: SKIN/TISSUE INTEGRITY - ADULT  Goal: Skin Integrity remains intact(Skin Breakdown Prevention)  Description: Assess:  -Perform Lee assessment every shift  -Clean and moisturize skin every shift and after incontinence  -Inspect skin when repositioning, toileting, and assisting with ADLS  -Assess extremities for adequate circulation and sensation     Bed Management:  -Have minimal linens on bed & keep smooth, unwrinkled  -Change linens as needed when moist or perspiring  -Avoid sitting or lying in one position for more than 2 hours while in bed  -Keep HOB at 30 degrees     Toileting:  -Offer bedside commode  -Assess for incontinence every 2 hours  -Use incontinent care products after each incontinent episode such as calazime/hyroguard    Activity:  -Mobilize patient as medically able  -Encourage or provide ROM exercises   -Turn and reposition patient every 2 Hours  -  Skin Care:  -Avoid use of baby powder, tape, friction and shearing, hot water or constrictive clothing    -Do not massage red bony areas    Outcome: Progressing  Goal: Pressure injury heals and does not worsen  Description: Interventions:  - Implement low air loss mattress or specialty surface (Criteria met)  - Apply silicone foam dressing    - Apply fecal or urinary incontinence containment device   - Perform passive or active ROM every shift  - Turn and reposition patient & offload bony prominences every 2 hours   - Utilize friction reducing device or surface for transfers   - Use incontinent care products after each incontinent episode such as hydroguard  - Consider nutrition services referral as needed  Outcome: Progressing     Problem: Prexisting or High Potential for Compromised Skin Integrity  Goal: Skin integrity is maintained or improved  Description: INTERVENTIONS:  - Identify patients at risk for skin breakdown  - Assess and monitor skin integrity  - Assess and monitor nutrition and hydration status  - Monitor labs   - Assess for incontinence   - Turn and reposition patient  - Assist with mobility/ambulation  - Relieve pressure over bony prominences  - Avoid friction and shearing  - Provide appropriate hygiene as needed including keeping skin clean and dry  - Evaluate need for skin moisturizer/barrier cream  - Collaborate with interdisciplinary team   - Patient/family teaching  - Consider wound care consult   Outcome: Progressing     Problem: Nutrition/Hydration-ADULT  Goal: Nutrient/Hydration intake appropriate for improving, restoring or maintaining nutritional needs  Description: Monitor and assess patient's nutrition/hydration status for malnutrition  Collaborate with interdisciplinary team and initiate plan and interventions as ordered  Monitor patient's weight and dietary intake as ordered or per policy  Utilize nutrition screening tool and intervene as necessary  Determine patient's food preferences and provide high-protein, high-caloric foods as appropriate       INTERVENTIONS:  - Monitor oral intake, urinary output, labs, and treatment plans  - Assess nutrition and hydration status and recommend course of action  - Evaluate amount of meals eaten  - Assist patient with eating if necessary   - Allow adequate time for meals  - Recommend/ encourage appropriate diets, oral nutritional supplements, and vitamin/mineral supplements  - Order, calculate, and assess calorie counts as needed  - Assess need for intravenous fluids  - Provide specific nutrition/hydration education as appropriate  - Include patient/family/caregiver in decisions related to nutrition  Outcome: Progressing

## 2021-08-16 NOTE — ASSESSMENT & PLAN NOTE
Pulmonary input appreciated  Acute component has resolved, patient saturating well on baseline utilization 2 L nasal cannula

## 2021-08-16 NOTE — PROGRESS NOTES
Progress Note - Pulmonary   Sarah Garcia 57 y o  male MRN: [id number]  Unit/Bed#:  Encounter: [id number]    Assessment/Plan:    1  Acute hypoxic and hypercapnic respiratory failure   1    Currently on 2 L nasal cannula  Oxygen requirement is 2 L nasal cannula continuously  2  Titrate oxygen to maintain SpO2 greater than or equal to 88%   3  Pulmonary toilet: Increase activity as tolerated, out of bed as tolerated, cough and deep breathing as encouraged, incentive spirometry q 1 hour  4  Continue BiPAP 18/8 during all hours of sleep-  Patient tolerates well overnight without complaints  2  Sepsis secondary to aspiration pneumonia   1  Imaging shows right lower lobe infiltrate suggestive of recurrent aspirations  2  Completed 5 days of cefepime and vancomycin- ID recommending discontinuing ABX today and monitor clinical response   3  Stopped Flagyl 8/13/21 due to possible allergic reaction (noted to have eosinophilia on differential)  4   Trend WBC, procalcitonin, temps  5  Speech evaluation revealed mild to moderate or pharyngeal decision recommend soft/level 3 diet and nectar thick liquids  6  Continue aspiration precautions  7  Of note, patient has chronic hypotension and has now been weaned off vasopressors   8  Discontinue Solucortef   9  Check CXR today   10  Check sputum   3  Drug rash   1  Thought to be caused by Flagyl then chlorhexidinebut has worsened   2  ?? Secondary to cefepime/vanco  3  Monitor off ABX per ID recommendations    4  O[facility]/ANGELINA in the setting of morbid obesity   1  Recommend BiPAP while inpatient  2  Resume home BIPAP at nursing home  3  Will need compliance report to review at next follow up to determine whether or not we need to make changes  5  Acute on chronic diastolic CHF  1  Recommend resuming diuretic therapy as patient is markedly volume overloaded on exam today-  Defer to primary team   2  Monitor I/ O and daily weights  6  Metabolic encephalopathy   1   Secondary to hypercapnia vs UTI   2  CT head negative  3  Continue to monitor- resolved, back at baseline   7  History of COVID-19 pneumonia underlying acute encephalopathy  1  With previous prolonged hospitalization   2  No need for COVID directed therapies at this time    Chief Complaint:    "My breathing is fine "    Subjective:     patient was seen examined today  No overnight events reported  Patient slept on BiPAP without incident  This morning he requests that he wants to drink ginger ale  He is very frustrated that he is unable to eat and drink the things that he wants  He is very dissatisfied with his modified diet  Patient reports that his breathing is stable  No chest pain, palpitations, worsening shortness of breath  No fevers or chills  He does have a cough but does not note significant sputum production  No wheezing or chest tightness  No hemoptysis  He does have significant lower extremity edema  Objective:    Vitals: Blood pressure 104/51, pulse 63, temperature 97 8 °F (36 6 °C), temperature source Temporal, resp  rate 19, height 5' 11" (1 803 m), weight (!) 162 kg (357 lb 5 9 oz), SpO2 97 %  2L NC,Body mass index is 49 84 kg/m²  Intake/Output Summary (Last 24 hours) at 8/16/2021 1421  Last data filed at 8/16/2021 1250  Gross per 24 hour   Intake 1087 75 ml   Output 1175 ml   Net -87 25 ml       Invasive Devices     Central Venous Catheter Line            CVC Central Lines 08/14/21 Right Internal jugular 1 day          Peripheral Intravenous Line            Peripheral IV 08/13/21 Right Antecubital 3 days    Peripheral IV 08/16/21 Left Hand <1 day                Physical Exam:     Physical Exam  Vitals and nursing note reviewed  Constitutional:       General: He is not in acute distress  Appearance: Normal appearance  HENT:      Head: Normocephalic and atraumatic        Right Ear: External ear normal       Left Ear: External ear normal       Nose: Nose normal       Mouth/Throat: Mouth: Mucous membranes are moist       Pharynx: Oropharynx is clear  Eyes:      Extraocular Movements: Extraocular movements intact  Conjunctiva/sclera: Conjunctivae normal       Pupils: Pupils are equal, round, and reactive to light  Cardiovascular:      Rate and Rhythm: Normal rate and regular rhythm  Pulses: Normal pulses  Heart sounds: No murmur heard  Abdominal:      General: Bowel sounds are normal       Palpations: Abdomen is soft  There is no mass  Tenderness: There is no abdominal tenderness  Hernia: No hernia is present  Musculoskeletal:         General: No tenderness or deformity  Normal range of motion  Cervical back: Normal range of motion and neck supple  No muscular tenderness  Right lower leg: No edema  Left lower leg: No edema  Skin:     General: Skin is warm and dry  Neurological:      General: No focal deficit present  Mental Status: He is alert and oriented to person, place, and time  Mental status is at baseline  Psychiatric:         Mood and Affect: Mood normal          Behavior: Behavior normal          Thought Content: Thought content normal          Judgment: Judgment normal          Labs: I have personally reviewed pertinent lab results  , ABG: No results found for: PHART, NRS1JFN, PO2ART, MOL3GDJ, X9ELEUDW, BEART, SOURCE, BNP: No results found for: BNP, CBC:   Lab Results   Component Value Date    WBC 15 81 (H) 08/16/2021    HGB 8 2 (L) 08/16/2021    HCT 31 2 (L) 08/16/2021    MCV 83 08/16/2021     08/16/2021    MCH 21 8 (L) 08/16/2021    MCHC 26 3 (L) 08/16/2021    RDW 20 6 (H) 08/16/2021    MPV 10 9 08/16/2021    NRBC 0 08/16/2021   , CMP:   Lab Results   Component Value Date    SODIUM 140 08/16/2021    K 3 0 (L) 08/16/2021     08/16/2021    CO2 32 08/16/2021    BUN 36 (H) 08/16/2021    CREATININE 1 62 (H) 08/16/2021    CALCIUM 8 2 (L) 08/16/2021    AST 8 08/16/2021    ALT 14 08/16/2021    ALKPHOS 64 08/16/2021 EGFR 45 08/16/2021   , PT/INR: No results found for: PT, INR, Troponin: No results found for: TROPONINI      Imaging and other studies: CXR pending

## 2021-08-16 NOTE — ASSESSMENT & PLAN NOTE
Secondary to hypercapnia versus UTI  He continues to improve, but still has moments of confusion  He's oriented to self / place / situation  Wife updated via telephone

## 2021-08-16 NOTE — PROGRESS NOTES
Random vanco level of 17 0 this morning    Will start 1000mg q24h starting today with a trough scheduled for 0930 on 8/18/21

## 2021-08-16 NOTE — ASSESSMENT & PLAN NOTE
Wt Readings from Last 3 Encounters:   08/16/21 (!) 162 kg (357 lb 5 9 oz)   07/14/21 (!) 155 kg (342 lb 3 2 oz)   03/04/21 (!) 153 kg (338 lb 3 oz)     Chronically on bumex 1mg BID - held due to hypotension  Patient fluid overload, will plan to discuss with Nephrology with hope to resume diuretic therapy  Monitor daily weights, strict I's and O's

## 2021-08-16 NOTE — ASSESSMENT & PLAN NOTE
This was initally felt due to flagyl, and there was concern for DRESS syndrome, however after review with critical care he's had eosinophilia in the past    - Flagyl has been discontinued, patient has on been cefepime and vancomycin with rash continuing to worsen - discontinue antibiotics with the patient having completed a 5-day course

## 2021-08-16 NOTE — RESPIRATORY THERAPY NOTE
08/15/21 2342   Respiratory Assessment   Assessment Type During-treatment   General Appearance Alert; Awake   Respiratory Pattern Normal   Chest Assessment Chest expansion symmetrical;Trachea midline   Bilateral Breath Sounds Diminished   Resp Comments Patient is not in respiratory distress  The machine was wiped down   Patient does have a prn Albuteral treatment if needed   Non-Invasive Information   O2 Interface Device Full face mask   Non-Invasive Ventilation Mode BiPAP   SpO2 97 %   $ Pulse Oximetry Spot Check Charge Completed   Non-Invasive Settings   IPAP (cm) 18 cm   EPAP (cm) 8 cm   Rate (Set) 8   FiO2 (%) 30   Pressure Support (cm H2O) 10   Rise Time 3   Inspiratory Time (Set) 1 5   Non-Invasive Readings   Total Rate 24   Vt (mL) (Mech) 889   MV (Mech) 21 6   Peak Pressure (Obs) 18   Skin Intervention Skin intact   Non-Invasive Alarms   Insp Pressure High (cm H20) 24   Insp Pressure Low (cm H20) 4   Low Insp Pressure Time (sec) 20 sec   MV Low (L/min) 4   Vt High (mL) 1500   Vt Low (mL) 200   High Resp Rate (BPM) 40 BPM   Low Resp Rate (BPM) 6 BPM   Apnea Interval (sec) 20   Apnea Volume (mL) 10

## 2021-08-16 NOTE — PROGRESS NOTES
[address] [facility] [address] [place]  Progress Note - Farida Hush 1960, 57 y o  male MRN: [id number]  Unit/Bed#:  Encounter: [id number]  Primary Care Provider: Hemnath Gu DO   Date and time admitted to hospital: 8/11/2021 10:17 AM    * Septic shock [facility])  Assessment & Plan  Chronic hypotension previously on midodrine home regimen, possible septic shock now resolved  Patient was noted for worsening leukocytosis, new fever and worsening hypotension   Lactic acid normal  Repeat Blood cultures obtained x 2 8/14/21 no growth to date  CT chest / abdomen / pelvis reviewed   The patient completed 5 days of antibiotic therapy for possible pneumonia, discussed with ID, input appreciated, will discontinue antibiotics and observe  Central line placed by surgical team    Midodrine restarted, weaned off Neosynepherine    Acute cystitis with hematuria  Assessment & Plan  Ruled out  Urine culture negative     Multifocal pneumonia  Assessment & Plan  Without fever or wbc count , it's unclear if findings are acute or sequale of recent covid infection   Possible aspiration vs  HCAP vs  Gram negative pna  Received 5 days therapy of vanco/cefepime - discussed with ID, will discontinue especially with worsening rash, suspected drug reaction  Procalcitonin, urine Legionella and pneumococcal antigens negative  Flagyl discontinued due to suspected allergy, allergy added      Acute on chronic diastolic (congestive) heart failure (HCC)  Assessment & Plan  Wt Readings from Last 3 Encounters:   08/16/21 (!) 162 kg (357 lb 5 9 oz)   07/14/21 (!) 155 kg (342 lb 3 2 oz)   03/04/21 (!) 153 kg (338 lb 3 oz)     Chronically on bumex 1mg BID - held due to hypotension  Patient fluid overload, will plan to discuss with Nephrology with hope to resume diuretic therapy  Monitor daily weights, strict I's and O's      Acute on chronic respiratory failure with hypoxia and hypercapnia (HCC)  Assessment & Plan  Pulmonary input appreciated  Acute component has resolved, patient saturating well on baseline utilization 2 L nasal cannula    Acute kidney injury superimposed on chronic kidney disease [facility])  Assessment & Plan  Lab Results   Component Value Date    EGFR 45 08/16/2021    EGFR 44 08/15/2021    EGFR 42 08/14/2021    CREATININE 1 62 (H) 08/16/2021    CREATININE 1 66 (H) 08/15/2021    CREATININE 1 71 (H) 08/14/2021     Baseline creatinine 1 1-1 4  Creatinine slightly above baseline and stable along 1 6 today  Monitor renal function closely with diuresis     Chronic anemia  Assessment & Plan  Baseline hemoglobin around 8-10, remains at baseline     Paroxysmal atrial fibrillation (HCC)  Assessment & Plan  Continue to hold cardizem due to hypotension  Continue metoprolol   Eliquis for [facility]    Dysphagia  Assessment & Plan  History of dysphagia with CT concerning aspiration  Modified diet instituted after speech evaluation    Drug rash  Assessment & Plan  This was initally felt due to flagyl, and there was concern for DRESS syndrome, however after review with critical care he's had eosinophilia in the past    - Flagyl has been discontinued, patient has on been cefepime and vancomycin with rash continuing to worsen - discontinue antibiotics with the patient having completed a 5-day course        Metabolic encephalopathy  Assessment & Plan  Secondary to hypercapnia versus UTI  He continues to improve, but still has moments of confusion  He's oriented to self / place / situation  Wife updated via telephone      VTE Pharmacologic Prophylaxis:   Pharmacologic: Apixaban (Eliquis)  Mechanical VTE Prophylaxis in Place: Yes    Patient Centered Rounds: I have performed bedside rounds with nursing staff today  Discussions with Specialists or Other Care Team Provider:  Multidisciplinary meeting    Education and Discussions with Family / Patient:  Patient's wife    Time Spent for Care: 45 minutes    More than 50% of total time spent on counseling and coordination of care as described above  Current Length of Stay: 5 day(s)    Current Patient Status: Inpatient   Certification Statement: The patient will continue to require additional inpatient hospital stay due to ICU monitoring    Discharge Plan:  TBD    Code Status: Level 1 - Full Code      Subjective:   Patient seen and examined  He is sleeping but arousable  He appears at his baseline neurologic status  He has reports generalized worsening rash associated with occasional itchiness  Objective:     Vitals:   Temp (24hrs), Av 6 °F (36 4 °C), Min:96 9 °F (36 1 °C), Max:98 1 °F (36 7 °C)    Temp:  [96 9 °F (36 1 °C)-98 1 °F (36 7 °C)] 97 8 °F (36 6 °C)  HR:  [60-81] 63  Resp:  [14-47] 19  BP: ()/(47-68) 104/51  SpO2:  [94 %-99 %] 97 %  Body mass index is 49 84 kg/m²  Input and Output Summary (last 24 hours): Intake/Output Summary (Last 24 hours) at 2021 1358  Last data filed at 2021 1250  Gross per 24 hour   Intake 1087 75 ml   Output 1175 ml   Net -87 25 ml       Physical Exam:     Physical Exam  Constitutional:       General: He is not in acute distress  Appearance: He is ill-appearing  HENT:      Head: Normocephalic and atraumatic  Mouth/Throat:      Pharynx: Oropharynx is clear  Eyes:      Conjunctiva/sclera: Conjunctivae normal    Cardiovascular:      Rate and Rhythm: Normal rate and regular rhythm  Heart sounds: No murmur heard  Pulmonary:      Effort: No respiratory distress  Breath sounds: No wheezing or rales  Abdominal:      General: There is no distension  Tenderness: There is no abdominal tenderness  There is no guarding  Musculoskeletal:      Right lower leg: No edema  Left lower leg: No edema  Skin:     Findings: Rash (generalized fine erythematous rash ) present  Neurological:      Mental Status: Mental status is at baseline     Psychiatric:         Mood and Affect: Mood normal          Additional Data: Labs:    Results from last 7 days   Lab Units 08/16/21  0506   WBC Thousand/uL 15 81*   HEMOGLOBIN g/dL 8 2*   HEMATOCRIT % 31 2*   PLATELETS Thousands/uL 172   NEUTROS PCT % 84*   LYMPHS PCT % 3*   MONOS PCT % 5   EOS PCT % 7*     Results from last 7 days   Lab Units 08/16/21  0506   SODIUM mmol/L 140   POTASSIUM mmol/L 3 0*   CHLORIDE mmol/L 104   CO2 mmol/L 32   BUN mg/dL 36*   CREATININE mg/dL 1 62*   ANION GAP mmol/L 4   CALCIUM mg/dL 8 2*   ALBUMIN g/dL 2 9*   TOTAL BILIRUBIN mg/dL 0 22   ALK PHOS U/L 64   ALT U/L 14   AST U/L 8   GLUCOSE RANDOM mg/dL 147*     Results from last 7 days   Lab Units 08/11/21  1034   INR  1 20*             Results from last 7 days   Lab Units 08/14/21  1002 08/13/21  0558 08/12/21  0444 08/11/21  1034   LACTIC ACID mmol/L 0 8  --   --  1 1   PROCALCITONIN ng/ml  --  0 18 0 07  --            * I Have Reviewed All Lab Data Listed Above  * Additional Pertinent Lab Tests Reviewed: [facility]    Imaging:    Imaging Reports Reviewed Today Include: CT chest abd pelvis wo contrast       Recent Cultures (last 7 days):     Results from last 7 days   Lab Units 08/14/21  1002 08/13/21  1323 08/11/21  1749   BLOOD CULTURE  No Growth at 24 hrs    No Growth at 24 hrs   --   --    URINE CULTURE   --  No Growth <1000 cfu/mL  --    LEGIONELLA URINARY ANTIGEN   --   --  Negative       Last 24 Hours Medication List:   Current Facility-Administered Medications   Medication Dose Route Frequency Provider Last Rate    acetaminophen  650 mg Oral Q6H PRN Shyam Risen, DO      albuterol  2 5 mg Nebulization Q4H PRN Elijah Huggins MD      allopurinol  100 mg Oral Daily Elijah Huggins MD      apixaban  2 5 mg Oral BID Elijah Huggins MD      aspirin  81 mg Oral Daily Lucille Corbett MD      atorvastatin  40 mg Oral Daily With Sol Glez MD      b complex-vitamin C-folic acid  1 capsule Oral Daily With MD [place] diphenhydrAMINE  25 mg Oral Q6H PRN Elijah Cruz MD      hydrocortisone sodium succinate  50 mg Intravenous [facility] Elijah Cruz MD      magnesium oxide  400 mg Oral Daily Elijah Cruz MD      metoprolol tartrate  25 mg Oral TID Geovanni Martin MD      midodrine  10 mg Oral TID AC Elijah Cruz MD      oxyCODONE  5 mg Oral Q4H PRN Fermin Montgomery DO      pantoprazole  40 mg Oral BID AC Elijah Cruz MD      phenylephine   mcg/min Intravenous Titrated Elijah Cruz MD Stopped (08/15/21 1652)    sertraline  100 mg Oral Daily Sandra Lerner MD          Today, Patient Was Seen By: Marline Carey MD    ** Please Note: Dictation voice to text software may have been used in the creation of this document   **

## 2021-08-17 ENCOUNTER — APPOINTMENT (INPATIENT)
Dept: INTERVENTIONAL RADIOLOGY/VASCULAR | Facility: HOSPITAL | Age: 61
DRG: 871 | End: 2021-08-17
Payer: MEDICARE

## 2021-08-17 LAB
ANION GAP SERPL CALCULATED.3IONS-SCNC: 6 MMOL/L (ref 4–13)
ANISOCYTOSIS BLD QL SMEAR: PRESENT
ATRIAL RATE: 103 BPM
ATRIAL RATE: 159 BPM
BASO STIPL BLD QL SMEAR: PRESENT
BASOPHILS # BLD MANUAL: 0 THOUSAND/UL (ref 0–0.1)
BASOPHILS NFR MAR MANUAL: 0 % (ref 0–1)
BUN SERPL-MCNC: 39 MG/DL (ref 5–25)
CALCIUM SERPL-MCNC: 8.4 MG/DL (ref 8.3–10.1)
CHLORIDE SERPL-SCNC: 104 MMOL/L (ref 100–108)
CO2 SERPL-SCNC: 33 MMOL/L (ref 21–32)
CREAT SERPL-MCNC: 1.57 MG/DL (ref 0.6–1.3)
EOSINOPHIL # BLD MANUAL: 1.38 THOUSAND/UL (ref 0–0.4)
EOSINOPHIL NFR BLD MANUAL: 11 % (ref 0–6)
ERYTHROCYTE [DISTWIDTH] IN BLOOD BY AUTOMATED COUNT: 20.8 % (ref 11.6–15.1)
GFR SERPL CREATININE-BSD FRML MDRD: 47 ML/MIN/1.73SQ M
GLUCOSE SERPL-MCNC: 104 MG/DL (ref 65–140)
HCT VFR BLD AUTO: 30.9 % (ref 36.5–49.3)
HGB BLD-MCNC: 8.3 G/DL (ref 12–17)
LG PLATELETS BLD QL SMEAR: PRESENT
LYMPHOCYTES # BLD AUTO: 0.75 THOUSAND/UL (ref 0.6–4.47)
LYMPHOCYTES # BLD AUTO: 6 % (ref 14–44)
MAGNESIUM SERPL-MCNC: 2.4 MG/DL (ref 1.6–2.6)
MCH RBC QN AUTO: 22.3 PG (ref 26.8–34.3)
MCHC RBC AUTO-ENTMCNC: 26.9 G/DL (ref 31.4–37.4)
MCV RBC AUTO: 83 FL (ref 82–98)
MONOCYTES # BLD AUTO: 0.75 THOUSAND/UL (ref 0–1.22)
MONOCYTES NFR BLD: 6 % (ref 4–12)
NEUTROPHILS # BLD MANUAL: 9.67 THOUSAND/UL (ref 1.85–7.62)
NEUTS BAND NFR BLD MANUAL: 2 % (ref 0–8)
NEUTS SEG NFR BLD AUTO: 75 % (ref 43–75)
NRBC BLD AUTO-RTO: 0 /100 WBCS
OVALOCYTES BLD QL SMEAR: PRESENT
P AXIS: 23 DEGREES
PLATELET # BLD AUTO: 207 THOUSANDS/UL (ref 149–390)
PLATELET BLD QL SMEAR: ADEQUATE
PMV BLD AUTO: 11.7 FL (ref 8.9–12.7)
POLYCHROMASIA BLD QL SMEAR: PRESENT
POTASSIUM SERPL-SCNC: 3.3 MMOL/L (ref 3.5–5.3)
PR INTERVAL: 168 MS
QRS AXIS: -37 DEGREES
QRS AXIS: 27 DEGREES
QRSD INTERVAL: 148 MS
QRSD INTERVAL: 158 MS
QT INTERVAL: 346 MS
QT INTERVAL: 388 MS
QTC INTERVAL: 508 MS
QTC INTERVAL: 555 MS
RBC # BLD AUTO: 3.73 MILLION/UL (ref 3.88–5.62)
SODIUM SERPL-SCNC: 143 MMOL/L (ref 136–145)
T WAVE AXIS: 168 DEGREES
T WAVE AXIS: 97 DEGREES
TOTAL CELLS COUNTED SPEC: 100
VENTRICULAR RATE: 103 BPM
VENTRICULAR RATE: 155 BPM
WBC # BLD AUTO: 12.56 THOUSAND/UL (ref 4.31–10.16)

## 2021-08-17 PROCEDURE — 36570 INSERT PICVAD CATH: CPT

## 2021-08-17 PROCEDURE — 99232 SBSQ HOSP IP/OBS MODERATE 35: CPT | Performed by: FAMILY MEDICINE

## 2021-08-17 PROCEDURE — 36410 VNPNXR 3YR/> PHY/QHP DX/THER: CPT | Performed by: RADIOLOGY

## 2021-08-17 PROCEDURE — 83735 ASSAY OF MAGNESIUM: CPT | Performed by: FAMILY MEDICINE

## 2021-08-17 PROCEDURE — NC001 PR NO CHARGE: Performed by: RADIOLOGY

## 2021-08-17 PROCEDURE — 76937 US GUIDE VASCULAR ACCESS: CPT | Performed by: RADIOLOGY

## 2021-08-17 PROCEDURE — 94760 N-INVAS EAR/PLS OXIMETRY 1: CPT

## 2021-08-17 PROCEDURE — C1751 CATH, INF, PER/CENT/MIDLINE: HCPCS

## 2021-08-17 PROCEDURE — 80048 BASIC METABOLIC PNL TOTAL CA: CPT | Performed by: FAMILY MEDICINE

## 2021-08-17 PROCEDURE — 85007 BL SMEAR W/DIFF WBC COUNT: CPT | Performed by: FAMILY MEDICINE

## 2021-08-17 PROCEDURE — 85027 COMPLETE CBC AUTOMATED: CPT | Performed by: FAMILY MEDICINE

## 2021-08-17 PROCEDURE — 93010 ELECTROCARDIOGRAM REPORT: CPT | Performed by: INTERNAL MEDICINE

## 2021-08-17 PROCEDURE — 99231 SBSQ HOSP IP/OBS SF/LOW 25: CPT | Performed by: PHYSICIAN ASSISTANT

## 2021-08-17 PROCEDURE — 03PYX3Z REMOVAL OF INFUSION DEVICE FROM UPPER ARTERY, EXTERNAL APPROACH: ICD-10-PCS | Performed by: RADIOLOGY

## 2021-08-17 PROCEDURE — C1894 INTRO/SHEATH, NON-LASER: HCPCS

## 2021-08-17 PROCEDURE — 94660 CPAP INITIATION&MGMT: CPT

## 2021-08-17 PROCEDURE — 99232 SBSQ HOSP IP/OBS MODERATE 35: CPT | Performed by: PHYSICIAN ASSISTANT

## 2021-08-17 RX ORDER — POTASSIUM CHLORIDE 20 MEQ/1
40 TABLET, EXTENDED RELEASE ORAL ONCE
Status: COMPLETED | OUTPATIENT
Start: 2021-08-17 | End: 2021-08-17

## 2021-08-17 RX ORDER — BUMETANIDE 1 MG/1
2 TABLET ORAL 2 TIMES DAILY
Status: DISCONTINUED | OUTPATIENT
Start: 2021-08-17 | End: 2021-08-19 | Stop reason: HOSPADM

## 2021-08-17 RX ORDER — LIDOCAINE WITH 8.4% SOD BICARB 0.9%(10ML)
SYRINGE (ML) INJECTION CODE/TRAUMA/SEDATION MEDICATION
Status: COMPLETED | OUTPATIENT
Start: 2021-08-17 | End: 2021-08-17

## 2021-08-17 RX ADMIN — SERTRALINE HYDROCHLORIDE 100 MG: 100 TABLET ORAL at 10:45

## 2021-08-17 RX ADMIN — ATORVASTATIN CALCIUM 40 MG: 40 TABLET, FILM COATED ORAL at 18:25

## 2021-08-17 RX ADMIN — HYDROCORTISONE SODIUM SUCCINATE 50 MG: 100 INJECTION, POWDER, FOR SOLUTION INTRAMUSCULAR; INTRAVENOUS at 14:55

## 2021-08-17 RX ADMIN — METOPROLOL TARTRATE 25 MG: 25 TABLET, FILM COATED ORAL at 18:26

## 2021-08-17 RX ADMIN — ASPIRIN 81 MG: 81 TABLET, COATED ORAL at 10:45

## 2021-08-17 RX ADMIN — APIXABAN 2.5 MG: 2.5 TABLET, FILM COATED ORAL at 18:29

## 2021-08-17 RX ADMIN — APIXABAN 2.5 MG: 2.5 TABLET, FILM COATED ORAL at 10:46

## 2021-08-17 RX ADMIN — Medication 10 ML: at 15:53

## 2021-08-17 RX ADMIN — HYDROCORTISONE SODIUM SUCCINATE 50 MG: 100 INJECTION, POWDER, FOR SOLUTION INTRAMUSCULAR; INTRAVENOUS at 23:00

## 2021-08-17 RX ADMIN — BUMETANIDE 1 MG: 1 TABLET ORAL at 10:45

## 2021-08-17 RX ADMIN — DIPHENHYDRAMINE HYDROCHLORIDE 25 MG: 25 TABLET ORAL at 10:57

## 2021-08-17 RX ADMIN — MIDODRINE HYDROCHLORIDE 10 MG: 5 TABLET ORAL at 06:46

## 2021-08-17 RX ADMIN — PANTOPRAZOLE SODIUM 40 MG: 40 TABLET, DELAYED RELEASE ORAL at 18:28

## 2021-08-17 RX ADMIN — MIDODRINE HYDROCHLORIDE 10 MG: 5 TABLET ORAL at 10:57

## 2021-08-17 RX ADMIN — ALLOPURINOL 100 MG: 100 TABLET ORAL at 10:46

## 2021-08-17 RX ADMIN — METOPROLOL TARTRATE 25 MG: 25 TABLET, FILM COATED ORAL at 10:45

## 2021-08-17 RX ADMIN — MAGNESIUM OXIDE TAB 400 MG (241.3 MG ELEMENTAL MG) 400 MG: 400 (241.3 MG) TAB at 10:46

## 2021-08-17 RX ADMIN — MIDODRINE HYDROCHLORIDE 10 MG: 5 TABLET ORAL at 18:26

## 2021-08-17 RX ADMIN — BUMETANIDE 2 MG: 1 TABLET ORAL at 18:29

## 2021-08-17 RX ADMIN — PANTOPRAZOLE SODIUM 40 MG: 40 TABLET, DELAYED RELEASE ORAL at 06:46

## 2021-08-17 RX ADMIN — HYDROCORTISONE SODIUM SUCCINATE 50 MG: 100 INJECTION, POWDER, FOR SOLUTION INTRAMUSCULAR; INTRAVENOUS at 05:50

## 2021-08-17 RX ADMIN — POTASSIUM CHLORIDE 40 MEQ: 1500 TABLET, EXTENDED RELEASE ORAL at 10:43

## 2021-08-17 NOTE — ASSESSMENT & PLAN NOTE
Chronic hypotension previously on midodrine home regimen, possible septic shock now resolved  Patient was noted for worsening leukocytosis, new fever and worsening hypotension   Lactic acid normal  Repeat Blood cultures obtained x 2 8/14/21 no growth 48 hours  CT chest / abdomen / pelvis reviewed - no clear focal infection identified  The patient completed 5 days of antibiotic therapy for possible pneumonia, discussed with ID, input appreciated, will discontinue antibiotics and observe  Central line placed by surgical team    Midodrine restarted, weaned off Neosynepherine 8/15/21  Downgrade to [facility] surg

## 2021-08-17 NOTE — PROGRESS NOTES
Progress Note - Pulmonary   Francisco Pace 57 y o  male MRN: [id number]  Unit/Bed#:  Encounter: [id number]    Assessment/Plan:    1  Acute on chronic hypoxic and hypercapnic respiratory failure   1  Currently on 2 L nasal cannula  Oxygen requirement is 2 L nasal cannula continuously  2  Titrate oxygen to maintain SpO2 greater than or equal to 88%   3  Pulmonary toilet: Increase activity as tolerated, out of bed as tolerated, cough and deep breathing as encouraged, incentive spirometry q 1 hour  4  Continue BiPAP 16/8 during all hours of sleep-  Patient tolerates well overnight without complaints  2  Sepsis secondary to aspiration pneumonia   1  Imaging shows right lower lobe infiltrate suggestive of recurrent aspirations  2  Completed 5 days of cefepime and vancomycin- ID recommending discontinuing ABX today and monitor clinical response, remains stable today- will continue to monitor off ABX  3  Stopped Flagyl 8/13/21 due to possible allergic reaction (noted to have eosinophilia on differential)  4  Trend WBC, procalcitonin, temps  5  Speech evaluation revealed mild to moderate or pharyngeal decision recommend soft/level 3 diet and nectar thick liquids  6  Continue aspiration precautions  7  Of note, patient has chronic hypotension and has now been weaned off vasopressors and restarted on his midodrine  8  Check sputum- unable to obtain today  3  Drug rash   1  Improving   2  Thought to be caused by Flagyl then chlorhexidinebut has worsened   3  ?? Secondary to cefepime/vanco  4  Monitor off ABX per ID recommendations    4  OHS/ANGELINA in the setting of morbid obesity   1  Recommend BiPAP while inpatient  2  Resume home BIPAP at nursing home  3  Will need compliance report to review at next follow up to determine whether or not we need to make changes  5  Acute on chronic diastolic CHF  1    Recommend resuming diuretic therapy as patient is markedly volume overloaded on exam today-  Defer to primary team 2  Monitor I/ O and daily weights  6  Metabolic encephalopathy   1  Secondary to hypercapnia vs UTI   2  CT head negative  3  Continue to monitor- resolved, back at baseline   7  History of COVID-19 pneumonia underlying acute encephalopathy  1  With previous prolonged hospitalization   2  No need for COVID directed therapies at this time     Plan of care discussed with patient and Dr Christiano Trejo and Dina Álvarez RN    Chief Complaint:    I feel okay      Subjective:    Patient seen examined today  No overnight events reported  Patient states that he his breathing is fine  He has a mild cough  Is on able to expectorates sputum  No wheeze, chest tightness, chest pain or palpitations  No fevers or chills  He thinks the rash is getting better  He does admit to significant lower extremity swelling  Objective:    Vitals: Blood pressure 136/64, pulse 65, temperature 97 9 °F (36 6 °C), temperature source Temporal, resp  rate 22, height 5' 11" (1 803 m), weight (!) 161 kg (354 lb 15 1 oz), SpO2 99 %  2L NC,Body mass index is 49 5 kg/m²  Intake/Output Summary (Last 24 hours) at 8/17/2021 1222  Last data filed at 8/17/2021 0900  Gross per 24 hour   Intake 1080 ml   Output 1000 ml   Net 80 ml       Invasive Devices     Central Venous Catheter Line            [facility] Central Lines 08/14/21 Right Internal jugular 2 days                Physical Exam:     Physical Exam  Vitals and nursing note reviewed  Constitutional:       General: He is not in acute distress  Appearance: Normal appearance  He is obese  HENT:      Head: Normocephalic and atraumatic  Right Ear: External ear normal       Left Ear: External ear normal       Nose: Nose normal       Mouth/Throat:      Mouth: Mucous membranes are moist       Pharynx: Oropharynx is clear  Eyes:      Extraocular Movements: Extraocular movements intact  Conjunctiva/sclera: Conjunctivae normal       Pupils: Pupils are equal, round, and reactive to light  Cardiovascular:      Rate and Rhythm: Normal rate and regular rhythm  Pulses: Normal pulses  Heart sounds: No murmur heard  Pulmonary:      Effort: Pulmonary effort is normal  No respiratory distress  Breath sounds: No wheezing, rhonchi or rales  Comments: Diminished breath sounds bibasilarly  Abdominal:      General: Bowel sounds are normal       Palpations: Abdomen is soft  There is no mass  Tenderness: There is no abdominal tenderness  Hernia: No hernia is present  Musculoskeletal:         General: No tenderness or deformity  Normal range of motion  Cervical back: Normal range of motion and neck supple  No muscular tenderness  Right lower leg: Edema present  Left lower leg: Edema present  Skin:     General: Skin is warm and dry  Neurological:      General: No focal deficit present  Mental Status: He is alert and oriented to person, place, and time  Mental status is at baseline  Psychiatric:         Mood and Affect: Mood normal          Behavior: Behavior normal          Thought Content: Thought content normal          Judgment: Judgment normal          Labs: I have personally reviewed pertinent lab results  , ABG: No results found for: PHART, XDA1EGV, PO2ART, ETO0YHP, I0EDDHMJ, BEART, SOURCE, BNP: No results found for: BNP, CBC:   Lab Results   Component Value Date    WBC 12 56 (H) 08/17/2021    HGB 8 3 (L) 08/17/2021    HCT 30 9 (L) 08/17/2021    MCV 83 08/17/2021     08/17/2021    MCH 22 3 (L) 08/17/2021    MCHC 26 9 (L) 08/17/2021    RDW 20 8 (H) 08/17/2021    MPV 11 7 08/17/2021    NRBC 0 08/17/2021   , CMP:   Lab Results   Component Value Date    SODIUM 143 08/17/2021    K 3 3 (L) 08/17/2021     08/17/2021    CO2 33 (H) 08/17/2021    BUN 39 (H) 08/17/2021    CREATININE 1 57 (H) 08/17/2021    CALCIUM 8 4 08/17/2021    EGFR 47 08/17/2021   , PT/INR: No results found for: PT, INR, Troponin: No results found for: TROPONINI    Imaging and other studies: I have personally reviewed pertinent films in PACS

## 2021-08-17 NOTE — ASSESSMENT & PLAN NOTE
Continue to hold cardizem due to hypotension  Continue metoprolol   Eliquis for [facility]

## 2021-08-17 NOTE — ASSESSMENT & PLAN NOTE
Lab Results   Component Value Date    EGFR 47 08/17/2021    EGFR 45 08/16/2021    EGFR 44 08/15/2021    CREATININE 1 57 (H) 08/17/2021    CREATININE 1 62 (H) 08/16/2021    CREATININE 1 66 (H) 08/15/2021     Baseline creatinine 1 1-1 4  Creatinine slightly above baseline and stable along 1 6 today  Monitor renal function closely with diuresis

## 2021-08-17 NOTE — ASSESSMENT & PLAN NOTE
Wt Readings from Last 3 Encounters:   08/17/21 (!) 161 kg (354 lb 15 1 oz)   07/14/21 (!) 155 kg (342 lb 3 2 oz)   03/04/21 (!) 153 kg (338 lb 3 oz)     Chronically on bumex 1mg BID - was held initially now resumed   Diuretic therapy per Nephrology   Monitor daily weights, strict I's and O's

## 2021-08-17 NOTE — CASE MANAGEMENT
Case Management Progress Note    Patient name Leslie Villafana  Location / MRN [id number]  : 1960 Date 2021       LOS (days): 6  Geometric Mean LOS (GMLOS) (days):   Days to GMLOS:        BUNDLE:      OBJECTIVE:  Pt is a 57y o  year old /[employer], white or  [1], male with [employer] preference of [address] admitted on  10:17 AM  Pt is admitted to  at [facility] with complaints of Septic shock ([facility] [place] [address] )   Current admission status: Inpatient  Preferred Pharmacy:   [id number] [address], [address] [place] [address]  Phone: [phone] Fax: [phone]    Primary Care Provider: Sandra Millan DO    Primary Insurance: [facility] [address]  Secondary Insurance:     PROGRESS NOTE:  Continuing to follow pt's medical status and assist in dc planning  Possible dc in 2-3 days back to [facility]  CM will continue to follow and update [facility]

## 2021-08-17 NOTE — PROGRESS NOTES
Progress Note - Nephrology   Ady Rodriguez 57 y o  male MRN: [id number]  Unit/Bed#:  Encounter: [id number]    Magali Lopes is a 57 y o  M with HTN, HLD, hx CVA admitted to [address] [place] [address] ICU on 08/11/2021 for acute hypoxic and hypercarbic respiratory failure likely related to obesity hypoventilation syndrome and underlying pneumonia, as well as congestive heart failure  Nephrology was consulted for acute kidney injury with creatinine increased to 1 71 mg/dL on 08/14/2021, increased from creatinine 1 45 mg/dL on presentation  Assessment and Plan    Summary:  Renal function is not improved but remains stable with resumption of diuresis with bumetanide 1 mg p o  Twice daily  There is no significant increased urine output  Will increase bumetanide to 2 mg twice daily  Will gently tight fluid restriction from 2 L daily to 1 8 L daily  Blood pressures are acceptable  Patient requires potassium repletion  Acute kidney injury likely due to ATN superimposed on chronic kidney disease  · Renal function is unchanged with creatinine 1 5 mg/dL, but not worsened with diuresis  · Urine output is 900 mL yesterday as recorded  Chronic kidney disease, stage 3A with baseline creatinine 1 19 to 1 3 mg/dL  ? Follows with a nephrologist in the [facility]  History of dialysis dependence in 2019, per 2/12/21 nephrology note  Proteinuria  ? Protein to creatinine ratio 1 8 on 08/15/2021  Would hold Ace or Arb due to current acute kidney injury  Further workup should be obtained in the outpatient setting, if not already performed  Defer to patient's primary nephrologist   Hypokalemia  · Potassium 3 3 millimole per L on 08/17/2021  Magnesium is sufficient to 0 2 mg/dL on 08/16/2021  Replete  Volume overload  ? Weight is increased about 16 lb since admission but is decreased 6 lb since yesterday, per bed scale  Note that patient is not able to get out of bed for standing weights    Acute on chronic diastolic congestive heart failure  ? Continue 2 g sodium restriction and 2 L fluid restriction  Add daily weights and I&Os  Resume diuresis as above  Hypotension, chronic  ? On home midodrine 10 mg 3 times daily, which is continued  ? Follow with reduced dose of metoprolol, 08/06/2021     ? Blood pressures are acceptable, 08/17/2021  Septic shock likely secondary to acute cystitis a multifocal pneumonia  ? On cefepime and vancomycin, per primary  Paroxysmal atrial fibrillation  ? On home diltiazem 180 mg daily, metoprolol 50 mg 3 times daily  ? Metoprolol tartrate has been decreased from 50 mg 3 times daily to twice daily today, 08/16/2021  Renal cysts  ? Exophytic left upper pole renal cyst additional exophytic cyst arising from the lower pole of the right kidney seen on CT of chest abdomen pelvis without contrast on 08/15/2021   ? Recommend outpatient ultrasound in 6 months to monitor for stability if clinically indicated    Follow up reason for today's visit: Acute kidney injury superimposed on chronic kidney disease, stage IIIA / electrolyte abnormalities / congestive heart failure    Septic shock [facility])    Patient Active Problem List   Diagnosis    Aortic stenosis, mild    Essential hypertension    Hyperlipidemia    Left bundle branch block    Hypokalemia    Murmur    Osteoarthritis of both knees    Pericardial disease    Sciatica    Age-related cataract of right eye    Venous insufficiency    Bilateral leg edema    Stress-induced cardiomyopathy    History of aortic valve replacement with bioprosthetic valve    Persistent atrial fibrillation (HCC)    Septic shock (HCC)    Leukocytosis    Metabolic encephalopathy    Drug rash    Coagulopathy (HCC)    Age-related nuclear cataract, bilateral    Open angle with borderline findings, low risk, bilateral    Presence of intraocular lens    Vitreous degeneration of both eyes    Left arm swelling    Dysphagia    Left internal jugular vein thrombus  Morbid obesity     Depression    Gastric ulcer    Paroxysmal atrial fibrillation (HCC)    COVID-19    Acute kidney injury superimposed on CKD (HCC)    Chronic anemia    Hypernatremia    Seizure (HCC)    History of stroke    Acute kidney injury superimposed on chronic kidney disease ([facility] [address] )    H/O heart valve replacement with tissue graft    Lower extremity edema    Acute on chronic respiratory failure with hypoxia and hypercapnia (HCC)    Obesity hypoventilation syndrome (HCC)    Acute on chronic diastolic (congestive) heart failure (HCC)    Multifocal pneumonia    Acute cystitis with hematuria         Subjective:   Patient is sleeping on rounds, appears comfortable  No acute events overnight  Nursing has no concerns  Objective:     Vitals: Blood pressure 136/64, pulse 65, temperature 97 9 °F (36 6 °C), temperature source Temporal, resp  rate 22, height 5' 11" (1 803 m), weight (!) 161 kg (354 lb 15 1 oz), SpO2 99 %  ,Body mass index is 49 5 kg/m²  Weight (last 2 days)     Date/Time   Weight    08/17/21 0550   (!) 161 (354 94)    08/16/21 1226   (!) 164 (360 89)    08/16/21 0549   (!) 162 (357 37)    08/15/21 0520   (!) 159 (349 65)                Intake/Output Summary (Last 24 hours) at 8/17/2021 1422  Last data filed at 8/17/2021 0900  Gross per 24 hour   Intake 840 ml   Output 800 ml   Net 40 ml     I/O last 3 completed shifts: In: 1400 [P O :1320;  I V :30; IV Piggyback:50]  Out: 1425 [Urine:1425]         Physical Exam: /64   Pulse 65   Temp 97 9 °F (36 6 °C) (Temporal)   Resp 22   Ht 5' 11" (1 803 m)   Wt (!) 161 kg (354 lb 15 1 oz)   SpO2 99%   BMI 49 50 kg/m²                                                                        General Appearance:    Alert, cooperative, no distress, appears stated age, morbidly obese   Head:    Normocephalic, without obvious abnormality, atraumatic   Eyes:    Conjunctiva/corneas clear   Ears:    Normal external ears   Nose:   Nasal cannula, Nares normal, septum midline, mucosa normal, no drainage  or sinus tenderness   Throat:   Lips, mucosa, and tongue normal; teeth and gums normal   Neck:   Supple, symmetrical   Back:     Symmetric, no curvature, ROM normal, no CVA tenderness   Lungs:    Decreased lung sounds, respirations unlabored   Chest wall:    No tenderness or deformity   Heart:    Regular rate and rhythm, S1 and S2 normal, +murmur, rub   or gallop   Abdomen:     Soft, non-tender, bowel sounds active   Extremities:   Extremities normal, atraumatic, no cyanosis, 3+ lower extremity pitting edema to his waist   Skin:   Skin color, texture, turgor normal, no rashes or lesions   Lymph nodes:   Cervical normal   Neurologic:   CNII-XII intact           Lab, Imaging and other studies: I have personally reviewed pertinent labs  CBC:   Lab Results   Component Value Date    WBC 12 56 (H) 08/17/2021    HGB 8 3 (L) 08/17/2021    HCT 30 9 (L) 08/17/2021    MCV 83 08/17/2021     08/17/2021    MCH 22 3 (L) 08/17/2021    MCHC 26 9 (L) 08/17/2021    RDW 20 8 (H) 08/17/2021    MPV 11 7 08/17/2021    NRBC 0 08/17/2021     CMP:   Lab Results   Component Value Date    K 3 3 (L) 08/17/2021     08/17/2021    CO2 33 (H) 08/17/2021    BUN 39 (H) 08/17/2021    CREATININE 1 57 (H) 08/17/2021    CALCIUM 8 4 08/17/2021    EGFR 47 08/17/2021           Results from last 7 days   Lab Units 08/17/21  0549 08/16/21  0506 08/15/21  0509 08/14/21  0431 08/13/21  0558 08/11/21  1515   POTASSIUM mmol/L 3 3* 3 0* 3 5 2 8* 3 5  --    CHLORIDE mmol/L 104 104 104 99* 101  --    CO2 mmol/L 33* 32 34* 35* 37*  --    CO2, I-STAT mmol/L  --   --   --   --   --  >45*   BUN mg/dL 39* 36* 38* 34* 35*  --    CREATININE mg/dL 1 57* 1 62* 1 66* 1 55* 1 58*  --    CALCIUM mg/dL 8 4 8 2* 8 2* 8 4 8 2*  --    ALK PHOS U/L  --  64  --  81 78  --    ALT U/L  --  14  --  14 13  --    AST U/L  --  8  --  12 12  --    GLUCOSE, ISTAT mg/dl  --   --   --   --   --  101 Phosphorus: No results found for: PHOS  Magnesium:   Lab Results   Component Value Date    MG 2 4 08/17/2021     Urinalysis: No results found for: Master Campi, SPECGRAV, PHUR, LEUKOCYTESUR, NITRITE, PROTEINUA, GLUCOSEU, KETONESU, BILIRUBINUR, BLOODU  Ionized Calcium: No results found for: CAION  Coagulation: No results found for: PT, INR, APTT  Troponin: No results found for: TROPONINI  ABG: No results found for: PHART, JDK8HBC, PO2ART, MCC0ACT, S2FXWGYQ, BEART, SOURCE  Radiology review:     IMAGING  Procedure: CT chest abdomen pelvis wo contrast    Result Date: 8/15/2021  Narrative: CT CHEST, ABDOMEN AND PELVIS WITHOUT IV CONTRAST INDICATION:   septic shock , no source other than pneumonia which is being treated with vanco/cefepim  COMPARISON:  CT chest from August 11, 2021 and CTA of the abdomen and pelvis from February 22, 2019  TECHNIQUE: CT examination of the chest, abdomen and pelvis was performed without intravenous contrast   Axial, sagittal, and coronal 2D reformatted images were created from the source data and submitted for interpretation  Radiation dose length product (DLP) for this visit:  2566 mGy-cm   This examination, like all CT scans performed in the [facility], was performed utilizing techniques to minimize radiation dose exposure, including the use of iterative reconstruction and automated exposure control  Enteric contrast was not administered  FINDINGS: CHEST LUNGS:  Mild respiratory motion and prominence of the interstitial markings with small bilateral pleural effusions and persistent areas of subsegmental atelectasis at the right base  Mild groundglass opacities in the upper lobes  No new dense consolidations  PLEURA:  Small pleural effusions  HEART/GREAT VESSELS:  Median sternotomy, cardiomegaly, coronary vascular calcifications, aortic valve replacement  No pericardial effusion  MEDIASTINUM AND JAMAR:  No mediastinal or hilar adenopathy   CHEST WALL AND LOWER NECK:   Unremarkable  ABDOMEN LIVER/BILIARY TREE:  Stable cyst within the quadrate lobe  GALLBLADDER:  No calcified gallstones  No pericholecystic inflammatory change  SPLEEN:  Unremarkable  PANCREAS:  Unremarkable  ADRENAL GLANDS:  Unremarkable  KIDNEYS/URETERS:  Exophytic left upper pole renal cyst   Additional exophytic cyst arising from the lower pole of the right kidney  No obstructive uropathy, perinephric fluid collection or perinephric inflammatory changes  STOMACH AND BOWEL:  Fluid-filled sigmoid colon with moderate distention of which the maximal transverse diameter is approximately 9 9 cm  No findings to suggest obstruction  APPENDIX:  No findings to suggest appendicitis  ABDOMINOPELVIC CAVITY:  No ascites  No pneumoperitoneum  No lymphadenopathy  VESSELS:  Unremarkable for patient's age  Stable asymmetric prominent collateral vessel along the right lateral chest wall extending from the axillary region to the right flank  Mild anasarca and nonspecific soft tissue inflammatory changes in the left  lateral thigh  Similar changes noted in the right flank region without a discrete fluid collection  Portions of the right lateral abdominal wall are not imaged given the patient's body habitus and the prescribed field of view  PELVIS REPRODUCTIVE ORGANS:  Unremarkable for patient's age  URINARY BLADDER:  Unremarkable  ABDOMINAL WALL/INGUINAL REGIONS:  Unremarkable  OSSEOUS STRUCTURES:  No acute fracture or destructive osseous lesion  Impression: Cardiomegaly with mild interstitial and pulmonary edema and small bilateral pleural effusions with stable right basilar subsegmental atelectasis  Distended sigmoid colon with an air-fluid level  Correlate for diarrheal illness  No evidence of bowel obstruction  Mild anasarca with scattered areas of mild inflammatory change in the subcutaneous soft tissues of the right lateral abdominal wall and left proximal thigh  Stable hepatic and renal cysts  Workstation performed: [id number]     Procedure: XR chest portable    Result Date: 8/15/2021  Narrative: CHEST INDICATION:   central line insertion  COMPARISON:  Chest radiograph and CT from 8/11/2021  EXAM PERFORMED/VIEWS:  XR CHEST PORTABLE FINDINGS:  Central line in the right supraclavicular region, coiled in the right neck with the tip pointing cephalad  Mild cardiomegaly  Median sternotomy  Mild pulmonary edema  Trace effusions  No pneumothorax  Osseous structures appear within normal limits for patient age  Impression: Malpositioned central catheter in the right supraclavicular region  Dr Kaushik Browne is aware of this per his procedure note on 8/14/2021  Mild pulmonary edema with trace effusions  No pneumothorax   Workstation performed: [id number]       Current Facility-Administered Medications   Medication Dose Route Frequency    acetaminophen (TYLENOL) tablet 650 mg  650 mg Oral Q6H PRN    albuterol inhalation solution 2 5 mg  2 5 mg Nebulization Q4H PRN    allopurinol (ZYLOPRIM) tablet 100 mg  100 mg Oral Daily    apixaban (ELIQUIS) tablet 2 5 mg  2 5 mg Oral BID    aspirin (ECOTRIN LOW STRENGTH) EC tablet 81 mg  81 mg Oral Daily    atorvastatin (LIPITOR) tablet 40 mg  40 mg Oral Daily With Dinner    b complex-vitamin C-folic acid (NEPHROCAPS) capsule 1 capsule  1 capsule Oral Daily With Dinner    bumetanide (BUMEX) tablet 1 mg  1 mg Oral BID    diphenhydrAMINE (BENADRYL) tablet 25 mg  25 mg Oral Q6H PRN    hydrocortisone sodium succinate (PF) (Solu-CORTEF) injection 50 mg  50 mg Intravenous Q8H [address]    magnesium oxide (MAG-OX) tablet 400 mg  400 mg Oral Daily    metoprolol tartrate (LOPRESSOR) tablet 25 mg  25 mg Oral TID    midodrine (PROAMATINE) tablet 10 mg  10 mg Oral TID AC    oxyCODONE (ROXICODONE) IR tablet 5 mg  5 mg Oral Q4H PRN    pantoprazole (PROTONIX) EC tablet 40 mg  40 mg Oral BID AC    phenylephrine (COLE-SYNEPHRINE) 50 mg (STANDARD CONCENTRATION) in sodium chloride 0 9% 250 mL   mcg/min Intravenous Titrated    sertraline (ZOLOFT) tablet 100 mg  100 mg Oral Daily     Medications Discontinued During This Encounter   Medication Reason    metroNIDAZOLE (FLAGYL) IVPB (premix) 500 mg 100 mL     bumetanide (BUMEX) injection 1 mg     vancomycin (VANCOCIN) 1,500 mg in sodium chloride 0 9 % 500 mL IVPB     diltiazem (CARDIZEM CD) 24 hr capsule 180 mg     vancomycin (VANCOCIN) 2,250 mg in sodium chloride 0 9 % 500 mL IVPB     metoprolol tartrate (LOPRESSOR) tablet 50 mg     vancomycin (VANCOCIN) 1,000 mg in sodium chloride 0 9 % 250 mL IVPB     cefepime (MAXIPIME) IVPB (premix in dextrose) 2,000 mg 50 mL     vancomycin (VANCOCIN) 1,000 mg in sodium chloride 0 9 % 250 mL IVPB        Julia Marquez PA-C    Portions of the record may have been created with voice recognition software  Occasional wrong word or "sound a like" substitutions may have occurred due to the inherent limitations of voice recognition software  Read the chart carefully and recognize, using context, where substitutions have occurred

## 2021-08-17 NOTE — ASSESSMENT & PLAN NOTE
Without fever or wbc count , it's unclear if findings are acute or sequale of recent covid infection   Possible aspiration vs  HCAP vs  Gram negative pna  Received 5 days therapy of vanco/cefepime - discussed with ID, discontinued antibiotics 8/16 especially with generalized rash, suspected drug reaction  Procalcitonin, urine Legionella and pneumococcal antigens negative  Flagyl discontinued due to suspected allergy, allergy added  Patient remains stable in his respiratory status

## 2021-08-17 NOTE — ASSESSMENT & PLAN NOTE
Secondary to hypercapnia versus UTI  Continues to improve in appears near his baseline  He's oriented to self / place / situation  Wife updated via telephone

## 2021-08-17 NOTE — PROCEDURES
Interventional Radiology Procedure Note    PATIENT NAME: Ady Rodriguez  : 1960  MRN: [id number]     Pre-op Diagnosis:   1  Hypoxia    2  Confusion    3  Hypercarbia    4  Pneumonia    5  Multifocal pneumonia    6  Acute cystitis with hematuria    7  Septic shock ([address] )    8  PATRICK (acute kidney injury) ([address] )      2  Malposition right internal jugular central line    Post-op Diagnosis:   1  Hypoxia    2  Confusion    3  Hypercarbia    4  Pneumonia    5  Multifocal pneumonia    6  Acute cystitis with hematuria    7  Septic shock ([address] )    8  PATRICK (acute kidney injury) ([address] )      2  Same    Procedure:  Midline placement    Surgeon:   Camryn Garza MD  Assistants:     No qualified resident was available, Resident is only observing    Estimated Blood Loss: Minimal     Findings: The original intention was to reposition the central line  The patient could not lay flat on the table  I placed a midline via the right arm  Ultrasound guidance was used  The central line was removed      Specimens: None     Complications:  None     Anesthesia: local    Camryn Garza MD     Date: 2021  Time: 4:36 PM

## 2021-08-17 NOTE — UTILIZATION REVIEW
Continued Stay Review    Date: 8/16/2021                    Current Patient Class: Inpatient  Current Level of Care: Critical care  HPI:61 y o  male initially admitted on 8/11 with septic shock, chronic hypotension previously on midodrine home regimen, possible septic shock now resolved  Assessment/Plan: Pt appears at baseline mental status today   Reports generalized worsening rash a/w occasional itchiness  Rash initially felt to be d/t flagyl with concern for DRESS syndrome, however later noted he's had eosinophilia in the past  Flagyl has been d/c'd, had been on cefepime and vanco with rash continued to worsen, d/c abx now having completed a 5 day course for possible PNA  Mitra Haque Blood cxs neg to date  Continue to monitor  Cr 1 6 today (baseline 1 1-1 4)  continue to monitor  Chronically on bumex 1mg BID - held d/t hypotension  Restart home bumetanide 1 mg p o  twice daily per nephrology  Monitor daily weights, strict I/O's  Continue to hold ACE/ARB  Low sodium diet, 2L fluid restriction  Continue other po meds      Vital Signs:   Time  Temp  Pulse  Resp  BP  MAP (mmHg)  SpO2  FiO2 (%)  Calculated FIO2 (%) - Nasal Cannula  O2 Flow Rate (L/min)  Nasal Cannula O2 Flow Rate (L/min)  O2 Device  O2 Interface Device  Patient Position - Orthostatic VS   08/16/21 2200  --  76  20  133/92  107  92 %  --  --  --  --  --  --  Lying   08/16/21 2000  --  66  --  110/57  74  96 %  --  --  --  --  --  --  Lying   08/16/21 1300  --  63  28Abnormal   103/50  72  98 %  --  --  --  --  --  --  --   08/16/21 0804  96 9 °F (36 1 °C)Abnormal   61  14  105/54  75  98 %  --  28  --  2 L/min  Nasal cannula  --  Lying   08/16/21 0500  --  68  21  83/68Abnormal   73  98 %  --  28  --  2 L/min  Nasal cannula  --  Lying   08/16/21 0435  --  75  25Abnormal   97/47Abnormal   68  95 %  30  --  --  --  BiPAP  --  Lying   08/16/21 0135  --  62  22  94/48Abnormal   68  98 %  30  --  --  --  BiPAP  --  Lying   08/16/21 0100  --  62  21  87/49Abnormal   65 98 %  30  --  --  --  BiPAP  --  Lying   08/16/21 0009  --  64  21  103/52  75  97 %  30  --  --  --  BiPAP  --  Lying   08/15/21 2340  98 °F (36 7 °C)  75  25Abnormal   122/59  85  97 %  --  28  --  2 L/min  Nasal cannula  --  Lying   08/15/21 2300  --  71  22  90/53  64  97 %  --  28  --  2 L/min  Nasal cannula  --  Lying   08/15/21 2236  --  75  23Abnormal   108/49Abnormal   70  97 %  --  28  --  2 L/min  Nasal cannula  --  Lying   08/15/21 2200  --  76  24Abnormal   100/48Abnormal   67  96 %  --  28  --  2 L/min  Nasal cannula  --  Lying   08/15/21 2000  --  71  22  107/55  79  98 %  --  28  --  2 L/min  Nasal cannula  --  Lying   08/15/21 1926  98 1 °F (36 7 °C)  --  --  --  --  --  --  --  --  --  --  --  --   08/15/21 1800  --  76  47Abnormal   98/53  69  94 %  --  --  --  --  --  --  --   08/15/21 1600  --  76  31Abnormal   104/49Abnormal   71  97 %  --  --  --  --  --  --  --   08/15/21 1548  97 2 °F (36 2 °C)Abnormal   70  28Abnormal   99/49Abnormal   71  98 %  --  28  --  2 L/min  Nasal cannula  --  Lying   08/15/21 1400  --  63  23Abnormal   95/51  70  98 %  --  --  --  --  --  --  --   08/15/21 1255  96 2 °F (35 7 °C)Abnormal   76  22  97/51  72  97 %  --  28  --  2 L/min  Nasal cannula  --  Lying   08/15/21 1200  --  70  43Abnormal   99/54  70  97 %  --  --  --  --  --  --  --   08/15/21 1000  --  83  28Abnormal   124/56  81  92 %  --  --  --  --  --  --  --         Pertinent Labs/Diagnostic Results:   CXR 8/15 -- Malpositioned central catheter in the right supraclavicular region   Dr Roxi Hassan is aware of this per his procedure note on 8/14/2021  Mild pulmonary edema with trace effusions  No pneumothorax    CT c/a/p 8/15 --   Cardiomegaly with mild interstitial and pulmonary edema and small bilateral pleural effusions with stable right basilar subsegmental atelectasis  Distended sigmoid colon with an air-fluid level   Correlate for diarrheal illness   No evidence of bowel obstruction  Mild anasarca with scattered areas of mild inflammatory change in the subcutaneous soft tissues of the right lateral abdominal wall and left proximal thigh  Stable hepatic and renal cysts         Results from last 7 days   Lab Units 08/16/21  0506 08/15/21  0509 08/14/21  0431 08/13/21  0558   WBC Thousand/uL 15 81* 18 65* 15 76* 12 06*   HEMOGLOBIN g/dL 8 2* 8 3* 10 4* 9 5*   HEMATOCRIT % 31 2* 30 7* 38 2 36 1*   PLATELETS Thousands/uL 172 191 203 177   NEUTROS ABS Thousands/µL 13 40* 16 42* 13 06* 9 84*   BANDS PCT %  --   --   --   --        Results from last 7 days   Lab Units 08/16/21  0506 08/15/21  0509 08/14/21  1340 08/14/21  0431 08/11/21  1515 08/11/21  1034   SODIUM mmol/L 140 142 138 139   < > 144   POTASSIUM mmol/L 3 0* 3 5 3 5 2 8*   < > 4 7   CHLORIDE mmol/L 104 104 100 99*   < > 102   CO2 mmol/L 32 34* 34* 35*   < > 43*   ANION GAP mmol/L 4 4 4 5   < > -1*   BUN mg/dL 36* 38* 39* 34*   < > 33*   CREATININE mg/dL 1 62* 1 66* 1 71* 1 55*   < > 1 45*   EGFR ml/min/1 73sq m 45 44 42 47   < > 51   CALCIUM mg/dL 8 2* 8 2* 8 0* 8 4   < > 9 1   MAGNESIUM mg/dL 2 2 2 2  --   --   --  2 6     Results from last 7 days   Lab Units 08/16/21  0506 08/14/21  0431 08/13/21  0558 08/11/21  1034   AST U/L 8 12 12 9   ALT U/L 14 14 13 13   ALK PHOS U/L 64 81 78 90   TOTAL PROTEIN g/dL 6 3* 6 7 6 5 7 6   ALBUMIN g/dL 2 9* 2 8* 2 8* 3 4*   TOTAL BILIRUBIN mg/dL 0 22 0 55 0 57 0 47   AMMONIA umol/L  --   --   --  13     Results from last 7 days   Lab Units 08/16/21  0506 08/15/21  0509 08/14/21  1340 08/14/21  0431 08/13/21  0558 08/12/21  0444 08/11/21  1034   GLUCOSE RANDOM mg/dL 147* 121 162* 94 111 96 97     Results from last 7 days   Lab Units 08/13/21  0558 08/12/21  0444   PROCALCITONIN ng/ml 0 18 0 07     Results from last 7 days   Lab Units 08/14/21  1002 08/11/21  1034   LACTIC ACID mmol/L 0 8 1 1     Results from last 7 days   Lab Units 08/15/21  1134 08/11/21  1749   CLARITY UA   --  Clear   COLOR UA   -- Yellow   SPEC GRAV UA   --  1 015   PH UA   --  6 0   GLUCOSE UA mg/dl  --  Negative   KETONES UA mg/dl  --  Negative   BLOOD UA   --  Small*   PROTEIN UA mg/dl  --  Trace*   NITRITE UA   --  Positive*   BILIRUBIN UA   --  Negative   UROBILINOGEN UA E U /dl  --  0 2   LEUKOCYTES UA   --  Moderate*   WBC UA /hpf  --  30-50*   RBC UA /hpf  --  2-4   BACTERIA UA /hpf  --  Occasional   EPITHELIAL CELLS WET PREP /hpf  --  Occasional   SODIUM UR  <5  --    CREATININE UR mg/dL 60 6  60 6  --    PROTEIN UR mg/dL 109  --    PROT/CREAT RATIO UR  1 80*  --      Results from last 7 days   Lab Units 08/14/21  1002 08/13/21  1323   BLOOD CULTURE  No Growth at 48 hrs  No Growth at 48 hrs  --    URINE CULTURE   --  No Growth <1000 cfu/mL     Results from last 7 days   Lab Units 08/17/21  0549   TOTAL COUNTED  100       Medications:   Scheduled Medications:  allopurinol, 100 mg, Oral, Daily  apixaban, 2 5 mg, Oral, BID  aspirin, 81 mg, Oral, Daily  atorvastatin, 40 mg, Oral, Daily With Dinner  b complex-vitamin C-folic acid, 1 capsule, Oral, Daily With Dinner  bumetanide, 1 mg, Oral, BID  hydrocortisone sodium succinate, 50 mg, Intravenous, Q8H ARACELIS  magnesium oxide, 400 mg, Oral, Daily  metoprolol tartrate, 25 mg, Oral, TID  midodrine, 10 mg, Oral, TID AC  pantoprazole, 40 mg, Oral, BID AC  potassium chloride, 40 mEq, Oral, Once  sertraline, 100 mg, Oral, Daily    Continuous IV Infusions:  phenylephine,  mcg/min, Intravenous, Titrated    PRN Meds:  acetaminophen, 650 mg, Oral, Q6H PRN  albuterol, 2 5 mg, Nebulization, Q4H PRN  diphenhydrAMINE, 25 mg, Oral, Q6H PRN  oxyCODONE, 5 mg, Oral, Q4H PRN        Discharge Plan: D    Network Utilization Review Department  ATTENTION: Please call with any questions or concerns to [phone] and carefully listen to the prompts so that you are directed to the right person   All voicemails are confidential   Vazquez Duarte all requests for admission clinical reviews, approved or denied determinations and any other requests to dedicated fax number below belonging to the campus where the patient is receiving treatment   List of dedicated fax numbers for the Facilities:  [address] [place] [address] DENIALS (Administrative/Medical Necessity) [phone]   [address] (Maternity/NICU/Pediatrics) [phone] [address] [place] [address] [phone] [place] [address] [address] [facility] [place] [address] [phone]

## 2021-08-17 NOTE — PROGRESS NOTES
[address] [facility] [address] [place]  Progress Note - Emely Himanshu 1960, 57 y o  male MRN: [id number]  Unit/Bed#:  Encounter: [id number]  Primary Care Provider: Earline Carnes DO   Date and time admitted to hospital: 8/11/2021 10:17 AM    * Septic shock [facility])  Assessment & Plan  Chronic hypotension previously on midodrine home regimen, possible septic shock now resolved  Patient was noted for worsening leukocytosis, new fever and worsening hypotension   Lactic acid normal  Repeat Blood cultures obtained x 2 8/14/21 no growth 48 hours  CT chest / abdomen / pelvis reviewed - no clear focal infection identified  The patient completed 5 days of antibiotic therapy for possible pneumonia, discussed with ID, input appreciated, will discontinue antibiotics and observe  Central line placed by surgical team    Midodrine restarted, weaned off Neosynepherine 8/15/21  Downgrade to med surg    Multifocal pneumonia  Assessment & Plan  Without fever or wbc count , it's unclear if findings are acute or sequale of recent covid infection   Possible aspiration vs  HCAP vs  Gram negative pna  Received 5 days therapy of vanco/cefepime - discussed with ID, discontinued antibiotics 8/16 especially with generalized rash, suspected drug reaction  Procalcitonin, urine Legionella and pneumococcal antigens negative  Flagyl discontinued due to suspected allergy, allergy added  Patient remains stable in his respiratory status       Acute on chronic diastolic (congestive) heart failure (HCC)  Assessment & Plan  Wt Readings from Last 3 Encounters:   08/17/21 (!) 161 kg (354 lb 15 1 oz)   07/14/21 (!) 155 kg (342 lb 3 2 oz)   03/04/21 (!) 153 kg (338 lb 3 oz)     Chronically on bumex 1mg BID - was held initially now resumed   Diuretic therapy per Nephrology   Monitor daily weights, strict I's and O's      Acute on chronic respiratory failure with hypoxia and hypercapnia ([address] )  Assessment & Plan  Pulmonary input appreciated  Acute component has resolved, patient saturating well on baseline utilization 2 L nasal cannula    Acute kidney injury superimposed on chronic kidney disease [facility])  Assessment & Plan  Lab Results   Component Value Date    EGFR 47 08/17/2021    EGFR 45 08/16/2021    EGFR 44 08/15/2021    CREATININE 1 57 (H) 08/17/2021    CREATININE 1 62 (H) 08/16/2021    CREATININE 1 66 (H) 08/15/2021     Baseline creatinine 1 1-1 4  Creatinine slightly above baseline and stable along 1 6 today  Monitor renal function closely with diuresis     Chronic anemia  Assessment & Plan  Baseline hemoglobin around 8-10, remains at baseline     Paroxysmal atrial fibrillation (HCC)  Assessment & Plan  Continue to hold cardizem due to hypotension  Continue metoprolol   Eliquis for [facility], operated by [facility]    Dysphagia  Assessment & Plan  History of dysphagia with CT concerning aspiration  Modified diet instituted after speech evaluation    Drug rash  Assessment & Plan  This was initally felt due to flagyl, and there was concern for DRESS syndrome, however after review with critical care he's had eosinophilia in the past    - Flagyl has been discontinued, patient has on been cefepime and vancomycin with rash continuing to worsen - discontinue antibiotics with the patient having completed a 5-day course  - continue serial examinations      Metabolic encephalopathy  Assessment & Plan  Secondary to hypercapnia versus UTI  Continues to improve in appears near his baseline  He's oriented to self / place / situation  Wife updated via telephone      VTE Pharmacologic Prophylaxis:   Pharmacologic: Apixaban (Eliquis)  Mechanical VTE Prophylaxis in Place: Yes    Patient Centered Rounds: I have performed bedside rounds with nursing staff today  Discussions with Specialists or Other Care Team Provider:  Will discuss with Nephrology    Education and Discussions with Family / Patient:  Patient's wife    Time Spent for Care: 45 minutes    More than 50% of total time spent on counseling and coordination of care as described above  Current Length of Stay: 6 day(s)    Current Patient Status: Inpatient   Certification Statement: The patient will continue to require additional inpatient hospital stay due to Close monitoring    Discharge Plan:  2-3 days    Code Status: Level 1 - Full Code      Subjective:   Patient seen and examined  He is sleeping but easily aroused  Complains of pruritic generalized rash, no other complaints, no overnight events  Good appetite  Objective:     Vitals:   Temp (24hrs), Av 7 °F (36 5 °C), Min:97 5 °F (36 4 °C), Max:97 9 °F (36 6 °C)    Temp:  [97 5 °F (36 4 °C)-97 9 °F (36 6 °C)] 97 9 °F (36 6 °C)  HR:  [55-77] 65  Resp:  [18-32] 22  BP: ()/(47-92) 136/64  SpO2:  [92 %-99 %] 99 %  Body mass index is 49 5 kg/m²  Input and Output Summary (last 24 hours): Intake/Output Summary (Last 24 hours) at 2021 1440  Last data filed at 2021 0900  Gross per 24 hour   Intake 840 ml   Output 800 ml   Net 40 ml       Physical Exam:     Physical Exam  Constitutional:       General: He is not in acute distress  HENT:      Head: Normocephalic and atraumatic  Nose: No congestion  Mouth/Throat:      Pharynx: Oropharynx is clear  Eyes:      Conjunctiva/sclera: Conjunctivae normal    Cardiovascular:      Rate and Rhythm: Normal rate and regular rhythm  Heart sounds: No murmur heard  Pulmonary:      Effort: No respiratory distress  Breath sounds: No wheezing or rales  Abdominal:      General: There is no distension  Tenderness: There is no abdominal tenderness  There is no guarding  Musculoskeletal:      Right lower leg: No edema  Left lower leg: No edema  Skin:     Findings: Rash (Generalized erythematous pruritic fine maculopapular) present  Neurological:      Mental Status: He is oriented to person, place, and time     Psychiatric:         Mood and Affect: Mood normal          Additional Data: Labs:    Results from last 7 days   Lab Units 08/17/21  0549 08/16/21  0506   WBC Thousand/uL 12 56* 15 81*   HEMOGLOBIN g/dL 8 3* 8 2*   HEMATOCRIT % 30 9* 31 2*   PLATELETS Thousands/uL 207 172   BANDS PCT % 2  --    NEUTROS PCT %  --  84*   LYMPHS PCT %  --  3*   LYMPHO PCT % 6*  --    MONOS PCT %  --  5   MONO PCT % 6  --    EOS PCT % 11* 7*     Results from last 7 days   Lab Units 08/17/21  0549 08/16/21  0506   SODIUM mmol/L 143 140   POTASSIUM mmol/L 3 3* 3 0*   CHLORIDE mmol/L 104 104   CO2 mmol/L 33* 32   BUN mg/dL 39* 36*   CREATININE mg/dL 1 57* 1 62*   ANION GAP mmol/L 6 4   CALCIUM mg/dL 8 4 8 2*   ALBUMIN g/dL  --  2 9*   TOTAL BILIRUBIN mg/dL  --  0 22   ALK PHOS U/L  --  64   ALT U/L  --  14   AST U/L  --  8   GLUCOSE RANDOM mg/dL 104 147*     Results from last 7 days   Lab Units 08/11/21  1034   INR  1 20*             Results from last 7 days   Lab Units 08/14/21  1002 08/13/21  0558 08/12/21  0444 08/11/21  1034   LACTIC ACID mmol/L 0 8  --   --  1 1   PROCALCITONIN ng/ml  --  0 18 0 07  --            * I Have Reviewed All Lab Data Listed Above  * Additional Pertinent Lab Tests Reviewed: [facility] 65 Admission Reviewed    Imaging:    Imaging Reports Reviewed Today Include: no new       Recent Cultures (last 7 days):     Results from last 7 days   Lab Units 08/14/21  1002 08/13/21  1323 08/11/21  1749   BLOOD CULTURE  No Growth at 48 hrs    No Growth at 48 hrs   --   --    URINE CULTURE   --  No Growth <1000 cfu/mL  --    LEGIONELLA URINARY ANTIGEN   --   --  Negative       Last 24 Hours Medication List:   Current Facility-Administered Medications   Medication Dose Route Frequency Provider Last Rate    acetaminophen  650 mg Oral Q6H PRN Thyra Lowers, DO      albuterol  2 5 mg Nebulization Q4H PRN Elijah Suárez MD      allopurinol  100 mg Oral Daily Elijah Suárez MD      apixaban  2 5 mg Oral BID Elijah Suárez MD      aspirin  81 mg Oral Daily Chele Duval MD  atorvastatin  40 mg Oral Daily With Malika Horner MD      b complex-vitamin C-folic acid  1 capsule Oral Daily With Malika Horner MD      bumetanide  1 mg Oral BID Eloina Vásquez PA-C      diphenhydrAMINE  25 mg Oral Q6H PRN Elijah Costa MD      hydrocortisone sodium succinate  50 mg Intravenous [facility] Elijah Costa MD      magnesium oxide  400 mg Oral Daily Elijah Costa MD      metoprolol tartrate  25 mg Oral TID Javon Ram MD      midodrine  10 mg Oral TID AC Elijah Costa MD      oxyCODONE  5 mg Oral Q4H PRN Glory Busby DO      pantoprazole  40 mg Oral BID AC Elijah Costa MD      sertraline  100 mg Oral Daily Kellee Su MD          Today, Patient Was Seen By: Indy Vásquez MD    ** Please Note: Dictation voice to text software may have been used in the creation of this document   **

## 2021-08-17 NOTE — ASSESSMENT & PLAN NOTE
This was initally felt due to flagyl, and there was concern for DRESS syndrome, however after review with critical care he's had eosinophilia in the past    - Flagyl has been discontinued, patient has on been cefepime and vancomycin with rash continuing to worsen - discontinue antibiotics with the patient having completed a 5-day course  - continue serial examinations

## 2021-08-18 LAB
ANION GAP SERPL CALCULATED.3IONS-SCNC: 4 MMOL/L (ref 4–13)
BUN SERPL-MCNC: 44 MG/DL (ref 5–25)
CALCIUM SERPL-MCNC: 8.8 MG/DL (ref 8.3–10.1)
CHLORIDE SERPL-SCNC: 106 MMOL/L (ref 100–108)
CO2 SERPL-SCNC: 34 MMOL/L (ref 21–32)
CREAT SERPL-MCNC: 1.61 MG/DL (ref 0.6–1.3)
ERYTHROCYTE [DISTWIDTH] IN BLOOD BY AUTOMATED COUNT: 21.1 % (ref 11.6–15.1)
GFR SERPL CREATININE-BSD FRML MDRD: 45 ML/MIN/1.73SQ M
GLUCOSE SERPL-MCNC: 119 MG/DL (ref 65–140)
HCT VFR BLD AUTO: 32.5 % (ref 36.5–49.3)
HGB BLD-MCNC: 8.5 G/DL (ref 12–17)
MCH RBC QN AUTO: 22.1 PG (ref 26.8–34.3)
MCHC RBC AUTO-ENTMCNC: 26.2 G/DL (ref 31.4–37.4)
MCV RBC AUTO: 84 FL (ref 82–98)
NRBC BLD AUTO-RTO: 0 /100 WBCS
PLATELET # BLD AUTO: 214 THOUSANDS/UL (ref 149–390)
PMV BLD AUTO: 10.9 FL (ref 8.9–12.7)
POTASSIUM SERPL-SCNC: 3.6 MMOL/L (ref 3.5–5.3)
RBC # BLD AUTO: 3.85 MILLION/UL (ref 3.88–5.62)
SODIUM SERPL-SCNC: 144 MMOL/L (ref 136–145)
VANCOMYCIN TROUGH SERPL-MCNC: 9.1 UG/ML (ref 10–20)
WBC # BLD AUTO: 10.71 THOUSAND/UL (ref 4.31–10.16)

## 2021-08-18 PROCEDURE — 80048 BASIC METABOLIC PNL TOTAL CA: CPT | Performed by: FAMILY MEDICINE

## 2021-08-18 PROCEDURE — 99232 SBSQ HOSP IP/OBS MODERATE 35: CPT | Performed by: INTERNAL MEDICINE

## 2021-08-18 PROCEDURE — 99232 SBSQ HOSP IP/OBS MODERATE 35: CPT | Performed by: PHYSICIAN ASSISTANT

## 2021-08-18 PROCEDURE — 80202 ASSAY OF VANCOMYCIN: CPT | Performed by: FAMILY MEDICINE

## 2021-08-18 PROCEDURE — 99232 SBSQ HOSP IP/OBS MODERATE 35: CPT | Performed by: FAMILY MEDICINE

## 2021-08-18 PROCEDURE — 94760 N-INVAS EAR/PLS OXIMETRY 1: CPT

## 2021-08-18 PROCEDURE — 94660 CPAP INITIATION&MGMT: CPT

## 2021-08-18 PROCEDURE — 85027 COMPLETE CBC AUTOMATED: CPT | Performed by: FAMILY MEDICINE

## 2021-08-18 RX ADMIN — HYDROCORTISONE SODIUM SUCCINATE 50 MG: 100 INJECTION, POWDER, FOR SOLUTION INTRAMUSCULAR; INTRAVENOUS at 13:28

## 2021-08-18 RX ADMIN — BUMETANIDE 2 MG: 1 TABLET ORAL at 10:36

## 2021-08-18 RX ADMIN — MIDODRINE HYDROCHLORIDE 10 MG: 5 TABLET ORAL at 13:27

## 2021-08-18 RX ADMIN — METOPROLOL TARTRATE 25 MG: 25 TABLET, FILM COATED ORAL at 10:35

## 2021-08-18 RX ADMIN — PANTOPRAZOLE SODIUM 40 MG: 40 TABLET, DELAYED RELEASE ORAL at 16:45

## 2021-08-18 RX ADMIN — BUMETANIDE 2 MG: 1 TABLET ORAL at 17:31

## 2021-08-18 RX ADMIN — METOPROLOL TARTRATE 25 MG: 25 TABLET, FILM COATED ORAL at 16:45

## 2021-08-18 RX ADMIN — MIDODRINE HYDROCHLORIDE 10 MG: 5 TABLET ORAL at 16:45

## 2021-08-18 RX ADMIN — MIDODRINE HYDROCHLORIDE 10 MG: 5 TABLET ORAL at 06:42

## 2021-08-18 RX ADMIN — MAGNESIUM OXIDE TAB 400 MG (241.3 MG ELEMENTAL MG) 400 MG: 400 (241.3 MG) TAB at 10:35

## 2021-08-18 RX ADMIN — METOPROLOL TARTRATE 25 MG: 25 TABLET, FILM COATED ORAL at 22:10

## 2021-08-18 RX ADMIN — HYDROCORTISONE SODIUM SUCCINATE 50 MG: 100 INJECTION, POWDER, FOR SOLUTION INTRAMUSCULAR; INTRAVENOUS at 06:42

## 2021-08-18 RX ADMIN — Medication 1 CAPSULE: at 16:44

## 2021-08-18 RX ADMIN — APIXABAN 2.5 MG: 2.5 TABLET, FILM COATED ORAL at 17:31

## 2021-08-18 RX ADMIN — APIXABAN 2.5 MG: 2.5 TABLET, FILM COATED ORAL at 10:36

## 2021-08-18 RX ADMIN — SERTRALINE HYDROCHLORIDE 100 MG: 100 TABLET ORAL at 10:36

## 2021-08-18 RX ADMIN — ALLOPURINOL 100 MG: 100 TABLET ORAL at 10:35

## 2021-08-18 RX ADMIN — PANTOPRAZOLE SODIUM 40 MG: 40 TABLET, DELAYED RELEASE ORAL at 06:42

## 2021-08-18 RX ADMIN — HYDROCORTISONE SODIUM SUCCINATE 50 MG: 100 INJECTION, POWDER, FOR SOLUTION INTRAMUSCULAR; INTRAVENOUS at 22:10

## 2021-08-18 RX ADMIN — ASPIRIN 81 MG: 81 TABLET, COATED ORAL at 10:36

## 2021-08-18 RX ADMIN — ATORVASTATIN CALCIUM 40 MG: 40 TABLET, FILM COATED ORAL at 16:44

## 2021-08-18 NOTE — PROGRESS NOTES
Progress Note - Nephrology   Tyson Alvarez 57 y o  male MRN: [id number]  Unit/Bed#: 410-01 Encounter: [id number]    Pershing Kanner is a 57 y o  M with HTN, HLD, hx CVA admitted to [address] [place] [address] ICU on 08/11/2021 for acute hypoxic and hypercarbic respiratory failure likely related to obesity hypoventilation syndrome and underlying pneumonia, as well as congestive heart failure   Nephrology was consulted for acute kidney injury with creatinine increased to 1 71 mg/dL on 08/14/2021, increased from creatinine 1 45 mg/dL on presentation  Assessment and Plan    Summary:  Renal function remains stable with increased dose of diuresis to bumetanide 2 mg p o  Twice daily  Edema appears improved  Will follow with this current plan  Acute kidney injury likely due to ATN superimposed on chronic kidney disease  ? Renal function is stable with creatinine 1 6 mg/dL on 8/18/21   ? Urine output is 800 mL yesterday as recorded  Uncertain of accuracy of urine sample collection  ? Continue current diuresis  Monitor renal function  Chronic kidney disease, stage 3A with baseline creatinine 1 19 to 1 3 mg/dL  ? Follows with a nephrologist in the [facility]   History of dialysis dependence in 2019, per 2/12/21 nephrology note  Proteinuria  ? Protein to creatinine ratio 1 8 on 08/15/2021   Would hold Ace or Arb due to current acute kidney injury   Further workup should be obtained in the outpatient setting, if not already performed   Defer to patient's primary nephrologist   Hypokalemia  ? Potassium 3  6 millimole per L on 08/18/2021  Continue to monitor  Replete as needed  Volume overload  ? Which is increased overnight as recorded, but edema is improved  Continue bumetanide 2 mg p o  Twice daily  Acute on chronic diastolic congestive heart failure  ? Continue 2 g sodium restriction and 2 L fluid restriction   Add daily weights and I&Os  Continue diuresis as above  Hypotension, chronic  ?  On home midodrine 10 mg 3 times daily, which is continued  ? Follow with reduced dose of metoprolol, 08/06/2021     ? On phenylephrine 8/14 through 8/15  ? Blood pressures are acceptable on midodrine, 08/18/2021  Septic shock (resolved) likely secondary to acute cystitis a multifocal pneumonia  ? Off pressors, transitioned to midodrine  S/p 5 days vancomycin and cefepime  Antibiotics discontinued per ID due to rash  Paroxysmal atrial fibrillation  ? On home diltiazem 180 mg daily, metoprolol 50 mg 3 times daily  ? Metoprolol tartrate has been decreased from 50 mg 3 times daily to twice daily today, 08/16/2021  Renal cysts  ? Exophytic left upper pole renal cyst additional exophytic cyst arising from the lower pole of the right kidney seen on CT of chest abdomen pelvis without contrast on 08/15/2021   ? Recommend outpatient ultrasound in 6 months to monitor for stability if clinically indicated    Follow up reason for today's visit: Acute kidney injury superimposed on chronic kidney disease, stage IIIA / electrolyte abnormalities / congestive heart failure      Septic shock [facility])    Patient Active Problem List   Diagnosis    Aortic stenosis, mild    Essential hypertension    Hyperlipidemia    Left bundle branch block    Hypokalemia    Murmur    Osteoarthritis of both knees    Pericardial disease    Sciatica    Age-related cataract of right eye    Venous insufficiency    Bilateral leg edema    Stress-induced cardiomyopathy    History of aortic valve replacement with bioprosthetic valve    Persistent atrial fibrillation (HCC)    Septic shock (HCC)    Leukocytosis    Metabolic encephalopathy    Drug rash    Coagulopathy (HCC)    Age-related nuclear cataract, bilateral    Open angle with borderline findings, low risk, bilateral    Presence of intraocular lens    Vitreous degeneration of both eyes    Left arm swelling    Dysphagia    Left internal jugular vein thrombus    Morbid obesity     Depression    Gastric ulcer  Paroxysmal atrial fibrillation (HCC)    COVID-19    Acute kidney injury superimposed on CKD (HCC)    Chronic anemia    Hypernatremia    Seizure (HCC)    History of stroke    Acute kidney injury superimposed on chronic kidney disease ([facility] [address] )    H/O heart valve replacement with tissue graft    Lower extremity edema    Acute on chronic respiratory failure with hypoxia and hypercapnia (HCC)    Obesity hypoventilation syndrome (HCC)    Acute on chronic diastolic (congestive) heart failure (HCC)    Multifocal pneumonia    Acute cystitis with hematuria         Subjective:   Patient denies chest pain, shortness of breath  He reports he is thirsty for a stone  He was assisted to utilize the telephone  A complete 10 point review of systems was performed and is otherwise negative  Objective:     Vitals: Blood pressure 125/65, pulse 55, temperature 97 9 °F (36 6 °C), resp  rate 16, height 5' 11" (1 803 m), weight (!) 162 kg (357 lb 2 3 oz), SpO2 99 %  ,Body mass index is 49 81 kg/m²  Weight (last 2 days)     Date/Time   Weight    08/18/21 0600   (!) 162 (357 15)    08/17/21 0550   (!) 161 (354 94)    08/16/21 1226   (!) 164 (360 89)    08/16/21 0549   (!) 162 (357 37)                Intake/Output Summary (Last 24 hours) at 8/18/2021 1232  Last data filed at 8/18/2021 0301  Gross per 24 hour   Intake 240 ml   Output 625 ml   Net -385 ml     I/O last 3 completed shifts:   In: 840 [P O :840]  Out: 1425 [Urine:1425]         Physical Exam: /65   Pulse 55   Temp 97 9 °F (36 6 °C)   Resp 16   Ht 5' 11" (1 803 m)   Wt (!) 162 kg (357 lb 2 3 oz)   SpO2 99%   BMI 49 81 kg/m²     General Appearance:    Alert, cooperative, no distress, appears stated age, obese   Head:    Normocephalic, without obvious abnormality, atraumatic   Eyes:    Conjunctiva/corneas clear   Ears:    Normal external ears   Nose:   Nares normal, septum midline, mucosa normal, no drainage  or sinus tenderness   Throat:   Lips, mucosa, and tongue normal; teeth and gums normal   Neck:   Supple, symmetrical   Back:     Symmetric, no curvature, ROM normal, no CVA tenderness   Lungs:     Clear to auscultation bilaterally, respirations unlabored   Chest wall:    No tenderness or deformity   Heart:    Regular rate and rhythm, S1 and S2 normal, no murmur, rub   or gallop   Abdomen:     Soft, non-tender, bowel sounds active   Extremities:   Extremities normal, atraumatic, no cyanosis, 2+ edema   Skin:   Skin color, texture, turgor normal, no rashes or lesions   Lymph nodes:   Cervical normal   Neurologic:   CNII-XII intact            Lab, Imaging and other studies: I have personally reviewed pertinent labs  CBC:   Lab Results   Component Value Date    WBC 10 71 (H) 08/18/2021    HGB 8 5 (L) 08/18/2021    HCT 32 5 (L) 08/18/2021    MCV 84 08/18/2021     08/18/2021    MCH 22 1 (L) 08/18/2021    MCHC 26 2 (L) 08/18/2021    RDW 21 1 (H) 08/18/2021    MPV 10 9 08/18/2021    NRBC 0 08/18/2021     CMP:   Lab Results   Component Value Date    K 3 6 08/18/2021     08/18/2021    CO2 34 (H) 08/18/2021    BUN 44 (H) 08/18/2021    CREATININE 1 61 (H) 08/18/2021    CALCIUM 8 8 08/18/2021    EGFR 45 08/18/2021           Results from last 7 days   Lab Units 08/18/21  0654 08/17/21  0549 08/16/21  0506 08/14/21  0431 08/13/21  0558 08/11/21  1515   POTASSIUM mmol/L 3 6 3 3* 3 0* 2 8* 3 5  --    CHLORIDE mmol/L 106 104 104 99* 101  --    CO2 mmol/L 34* 33* 32 35* 37*  --    CO2, I-STAT mmol/L  --   --   --   --   --  >45*   BUN mg/dL 44* 39* 36* 34* 35*  --    CREATININE mg/dL 1 61* 1 57* 1 62* 1 55* 1 58*  --    CALCIUM mg/dL 8 8 8 4 8 2* 8 4 8 2*  --    ALK PHOS U/L  --   --  64 81 78  --    ALT U/L  --   --  14 14 13  --    AST U/L  --   --  8 12 12  --    GLUCOSE, ISTAT mg/dl  --   --   --   --   --  101         Phosphorus: No results found for: PHOS  Magnesium: No results found for: MG  Urinalysis: No results found for: Hina Salter, RAQUEL ELIAS, LEUKOCYTESUR, NITRITE, PROTEINUA, GLUCOSEU, KETONESU, BILIRUBINUR, BLOODU  Ionized Calcium: No results found for: CAION  Coagulation: No results found for: PT, INR, APTT  Troponin: No results found for: TROPONINI  ABG: No results found for: PHART, VFA7GQA, PO2ART, TVG9RTC, R6HERPFS, BEART, SOURCE  Radiology review:     IMAGING  Procedure: IR midline placement    Result Date: 8/17/2021  Narrative: EXAMINATION: Midline, with only sonographic guidance  HISTORY: For long-term antibiotics  Expected duration of therapy of less than 30 days  Patient had a central line which was suboptimal in position  The original intention was to reposition under fluoroscopy  The patient could not lie flat on the table  We moved him back onto his bed, and sacrum are not  He noted much relief  I then placed a midline  TECHNIQUE: Informed consent was obtained  The right arm was prepped and draped in the usual sterile fashion  Timeout was performed  Lidocaine was given as local anesthesia  Using ultrasound guidance, a 21 gauge needle was used to cannulate the basilic vein  The needle was exchanged, over the wire, for a peel away sheath  An IV line was trimmed to 20 centimeters  The line was sterilely dressed  The patient's arm conference, at the catheter entry site, measures 31 centimeters The patient tolerated the procedure well  There were no immediate complications or complaints  FINDINGS: Ultrasound images show, the vein is compressible, and free of thrombus  Good caliber basilic vein     Impression: Technically successful placement of midline using sonographic guidance  This is the end of the clinically relevant portion of this report  Subsequent information is for compliance, documentation, and coding purposes  In the process of informed consent, information was communicated, verbally, to the patient regarding the procedure    The patient was informed of; the name of the procedure, nature of the procedure, nature of the condition, risks, benefits, alternatives, and potential complications, as well as the consequences of not having the procedure  All the patient's questions were answered  Informed consent was signed  Quoted risks include infection, as well as a 5% risk of thrombosis of the entry vein  Chlorhexidine and alcohol was used for cleansing and sterile preparation of the skin  This was allowed to dry prior to the application of sterile draping  Timeout was performed, with all team members present, and in agreement  Confirmation of patient, procedure, laterally, allergies, and all needed equipment was performed  When ultrasound was used for needle entry guidance, into the vein, static and real-time images of needle entry are obtained, and archived   PROCEDURE: Mid line PREPROCEDURE DIAGNOSIS: Please see "history ", above POSTPROCEDURE DIAGNOSIS: Same ANTIBIOTICS: None SPECIMEN: None ESTIMATED BLOOD LOSS: None ANESTHESIA: Local Workstation performed: [id number]       Current Facility-Administered Medications   Medication Dose Route Frequency    acetaminophen (TYLENOL) tablet 650 mg  650 mg Oral Q6H PRN    albuterol inhalation solution 2 5 mg  2 5 mg Nebulization Q4H PRN    allopurinol (ZYLOPRIM) tablet 100 mg  100 mg Oral Daily    apixaban (ELIQUIS) tablet 2 5 mg  2 5 mg Oral BID    aspirin (ECOTRIN LOW STRENGTH) EC tablet 81 mg  81 mg Oral Daily    atorvastatin (LIPITOR) tablet 40 mg  40 mg Oral Daily With Dinner    b complex-vitamin C-folic acid (NEPHROCAPS) capsule 1 capsule  1 capsule Oral Daily With Dinner    bumetanide (BUMEX) tablet 2 mg  2 mg Oral BID    diphenhydrAMINE (BENADRYL) tablet 25 mg  25 mg Oral Q6H PRN    hydrocortisone sodium succinate (PF) (Solu-CORTEF) injection 50 mg  50 mg Intravenous Q8H [address]    magnesium oxide (MAG-OX) tablet 400 mg  400 mg Oral Daily    metoprolol tartrate (LOPRESSOR) tablet 25 mg  25 mg Oral TID    midodrine (PROAMATINE) tablet 10 mg  10 mg Oral TID AC    oxyCODONE (ROXICODONE) IR tablet 5 mg  5 mg Oral Q4H PRN    pantoprazole (PROTONIX) EC tablet 40 mg  40 mg Oral BID AC    sertraline (ZOLOFT) tablet 100 mg  100 mg Oral Daily     Medications Discontinued During This Encounter   Medication Reason    metroNIDAZOLE (FLAGYL) IVPB (premix) 500 mg 100 mL     bumetanide (BUMEX) injection 1 mg     vancomycin (VANCOCIN) 1,500 mg in sodium chloride 0 9 % 500 mL IVPB     diltiazem (CARDIZEM CD) 24 hr capsule 180 mg     vancomycin (VANCOCIN) 2,250 mg in sodium chloride 0 9 % 500 mL IVPB     metoprolol tartrate (LOPRESSOR) tablet 50 mg     vancomycin (VANCOCIN) 1,000 mg in sodium chloride 0 9 % 250 mL IVPB     cefepime (MAXIPIME) IVPB (premix in dextrose) 2,000 mg 50 mL     vancomycin (VANCOCIN) 1,000 mg in sodium chloride 0 9 % 250 mL IVPB     phenylephrine (COLE-SYNEPHRINE) 50 mg (STANDARD CONCENTRATION) in sodium chloride 0 9% 250 mL     bumetanide (BUMEX) tablet 1 mg        Gallo Millan PA-C    Portions of the record may have been created with voice recognition software  Occasional wrong word or "sound a like" substitutions may have occurred due to the inherent limitations of voice recognition software  Read the chart carefully and recognize, using context, where substitutions have occurred

## 2021-08-18 NOTE — PLAN OF CARE
Problem: Potential for Falls  Goal: Patient will remain free of falls  Description: INTERVENTIONS:  - Educate patient/family on patient safety including physical limitations  - Instruct patient to call for assistance with activity   - Consult OT/PT to assist with strengthening/mobility   - Keep Call bell within reach  - Keep bed low and locked with side rails adjusted as appropriate  - Keep care items and personal belongings within reach  - Initiate and maintain comfort rounds  - Make Fall Risk Sign visible to staff  - Offer Toileting every 2 Hours, in advance of need  - Initiate/Maintain bed  alarm  - Obtain necessary fall risk management equipment: alarms  - Apply yellow socks and bracelet for high fall risk patients  - Consider moving patient to room near nurses station  Outcome: Progressing     Problem: MOBILITY - ADULT  Goal: Maintain or return to baseline ADL function  Description: INTERVENTIONS:  -  Assess patient's ability to carry out ADLs; assess patient's baseline for ADL function and identify physical deficits which impact ability to perform ADLs (bathing, care of mouth/teeth, toileting, grooming, dressing, etc )  - Assess/evaluate cause of self-care deficits   - Assess range of motion  - Assess patient's mobility; develop plan if impaired  - Assess patient's need for assistive devices and provide as appropriate  - Encourage maximum independence but intervene and supervise when necessary  - Involve family in performance of ADLs  - Assess for home care needs following discharge   - Consider OT consult to assist with ADL evaluation and planning for discharge  - Provide patient education as appropriate  Outcome: Progressing  Goal: Maintains/Returns to pre admission functional level  Description: INTERVENTIONS:  - Perform BMAT or MOVE assessment daily    - Set and communicate daily mobility goal to care team and patient/family/caregiver     - Collaborate with rehabilitation services on mobility goals if consulted  - Perform Range of Motion 2 times a day  - Reposition patient every 2 hours    - Dangle patient 2 times a day            - Record patient progress and toleration of activity level   Outcome: Progressing     Problem: PAIN - ADULT  Goal: Verbalizes/displays adequate comfort level or baseline comfort level  Description: Interventions:  - Encourage patient to monitor pain and request assistance  - Assess pain using appropriate pain scale  - Administer analgesics based on type and severity of pain and evaluate response  - Implement non-pharmacological measures as appropriate and evaluate response  - Consider cultural and social influences on pain and pain management  - Notify physician/advanced practitioner if interventions unsuccessful or patient reports new pain  Outcome: Progressing     Problem: INFECTION - ADULT  Goal: Absence or prevention of progression during hospitalization  Description: INTERVENTIONS:  - Assess and monitor for signs and symptoms of infection  - Monitor lab/diagnostic results  - Monitor all insertion sites, i e  indwelling lines, tubes, and drains  - Administer medications as ordered  - Instruct and encourage patient and family to use good hand hygiene technique  - Identify and instruct in appropriate isolation precautions for identified infection/condition  Outcome: Progressing     Problem: SAFETY ADULT  Goal: Patient will remain free of falls  Description: INTERVENTIONS:  - Educate patient/family on patient safety including physical limitations  - Instruct patient to call for assistance with activity   - Consult OT/PT to assist with strengthening/mobility   - Keep Call bell within reach  - Keep bed low and locked with side rails adjusted as appropriate  - Keep care items and personal belongings within reach  - Initiate and maintain comfort rounds  - Make Fall Risk Sign visible to staff  - Offer Toileting every 2 Hours, in advance of need  - Initiate/Maintain bed alarm  - Obtain necessary fall risk management equipment: alarms  - Apply yellow socks and bracelet for high fall risk patients  - Consider moving patient to room near nurses station  Outcome: Progressing  Goal: Maintain or return to baseline ADL function  Description: INTERVENTIONS:  -  Assess patient's ability to carry out ADLs; assess patient's baseline for ADL function and identify physical deficits which impact ability to perform ADLs (bathing, care of mouth/teeth, toileting, grooming, dressing, etc )  - Assess/evaluate cause of self-care deficits   - Assess range of motion  - Assess patient's mobility; develop plan if impaired  - Assess patient's need for assistive devices and provide as appropriate  - Encourage maximum independence but intervene and supervise when necessary  - Involve family in performance of ADLs  - Assess for home care needs following discharge   - Consider OT consult to assist with ADL evaluation and planning for discharge  - Provide patient education as appropriate  Outcome: Progressing  Goal: Maintains/Returns to pre admission functional level  Description: INTERVENTIONS:  - Perform BMAT or MOVE assessment daily    - Set and communicate daily mobility goal to care team and patient/family/caregiver  - Collaborate with rehabilitation services on mobility goals if consulted  - Perform Range of Motion  2 times a day  - Reposition patient every 2 hours      - Record patient progress and toleration of activity level   Outcome: Progressing     Problem: DISCHARGE PLANNING  Goal: Discharge to home or other facility with appropriate resources  Description: INTERVENTIONS:  - Identify barriers to discharge w/patient and caregiver  - Arrange for needed discharge resources and transportation as appropriate  - Identify discharge learning needs (meds, wound care, etc )  - Refer to Case Management Department for coordinating discharge planning if the patient needs post-hospital services based on physician/advanced practitioner order or complex needs related to functional status, cognitive ability, or social support system  Outcome: Progressing     Problem: Knowledge Deficit  Goal: Patient/family/caregiver demonstrates understanding of disease process, treatment plan, medications, and discharge instructions  Description: Complete learning assessment and assess knowledge base    Interventions:  - Provide teaching at level of understanding  - Provide teaching via preferred learning methods  Outcome: Progressing     Problem: CARDIOVASCULAR - ADULT  Goal: Maintains optimal cardiac output and hemodynamic stability  Description: INTERVENTIONS:  - Monitor I/O, vital signs and rhythm  - Monitor for S/S and trends of decreased cardiac output  - Administer and titrate ordered vasoactive medications to optimize hemodynamic stability  - Assess quality of pulses, skin color and temperature  - Assess for signs of decreased coronary artery perfusion  - Instruct patient to report change in severity of symptoms  Outcome: Progressing  Goal: Absence of cardiac dysrhythmias or at baseline rhythm  Description: INTERVENTIONS:  - Continuous cardiac monitoring, vital signs, obtain 12 lead EKG if ordered  - Administer antiarrhythmic and heart rate control medications as ordered  - Monitor electrolytes and administer replacement therapy as ordered  Outcome: Progressing     Problem: RESPIRATORY - ADULT  Goal: Achieves optimal ventilation and oxygenation  Description: INTERVENTIONS:  - Assess for changes in respiratory status  - Assess for changes in mentation and behavior  - Position to facilitate oxygenation and minimize respiratory effort  - Oxygen administered by appropriate delivery if ordered  - Initiate smoking cessation education as indicated  - Encourage broncho-pulmonary hygiene including cough, deep breathe, Incentive Spirometry  - Assess the need for suctioning and aspirate as needed  - Assess and instruct to report SOB or any respiratory difficulty  - Respiratory Therapy support as indicated  Outcome: Progressing     Problem: SKIN/TISSUE INTEGRITY - ADULT  Goal: Skin Integrity remains intact(Skin Breakdown Prevention)  Description: Assess:  -Perform Lee assessment every 4  -Clean and moisturize skin every 2  -Inspect skin when repositioning, toileting, and assisting with ADLS  -Assess under medical devices such as mosimo every 4  -Assess extremities for adequate circulation and sensation     Bed Management:  -Have minimal linens on bed & keep smooth, unwrinkled  -Change linens as needed when moist or perspiring    -Keep HOB at 30 degrees     Toileting:  -Offer bedside commode  -Assess for incontinence every 2  -Use incontinent care products after each incontinent episode such as spray        Skin Care:  -Avoid use of baby powder, tape, friction and shearing, hot water or constrictive clothing  -Relieve pressure over bony prominences using 2  -Do not massage red bony areas    Next Steps:  -Teach patient strategies to minimize risks such as repo q2 hrs   -Consider consults to  interdisciplinary teams such as pt/ot  Outcome: Progressing  Goal: Pressure injury heals and does not worsen  Description: Interventions:  - Implement low air loss mattress or specialty surface (Criteria met)  - Apply silicone foam dressing    - Consider nutrition services referral as needed  Outcome: Progressing     Problem: MUSCULOSKELETAL - ADULT  Goal: Maintain or return mobility to safest level of function  Description: INTERVENTIONS:  - Assess patient's ability to carry out ADLs; assess patient's baseline for ADL function and identify physical deficits which impact ability to perform ADLs (bathing, care of mouth/teeth, toileting, grooming, dressing, etc )  - Assess/evaluate cause of self-care deficits   - Assess range of motion  - Assess patient's mobility  - Assess patient's need for assistive devices and provide as appropriate  - Encourage maximum independence but intervene and supervise when necessary  - Involve family in performance of ADLs  - Assess for home care needs following discharge   - Consider OT consult to assist with ADL evaluation and planning for discharge  - Provide patient education as appropriate  Outcome: Progressing     Problem: Prexisting or High Potential for Compromised Skin Integrity  Goal: Skin integrity is maintained or improved  Description: INTERVENTIONS:  - Identify patients at risk for skin breakdown  - Assess and monitor skin integrity  - Assess and monitor nutrition and hydration status  - Monitor labs   - Assess for incontinence   - Turn and reposition patient  - Assist with mobility/ambulation  - Relieve pressure over bony prominences  - Avoid friction and shearing  - Provide appropriate hygiene as needed including keeping skin clean and dry  - Evaluate need for skin moisturizer/barrier cream  - Collaborate with interdisciplinary team   - Patient/family teaching  - Consider wound care consult   Outcome: Progressing     Problem: Nutrition/Hydration-ADULT  Goal: Nutrient/Hydration intake appropriate for improving, restoring or maintaining nutritional needs  Description: Monitor and assess patient's nutrition/hydration status for malnutrition  Collaborate with interdisciplinary team and initiate plan and interventions as ordered  Monitor patient's weight and dietary intake as ordered or per policy  Utilize nutrition screening tool and intervene as necessary  Determine patient's food preferences and provide high-protein, high-caloric foods as appropriate       INTERVENTIONS:  - Monitor oral intake, urinary output, labs, and treatment plans  - Assess nutrition and hydration status and recommend course of action  - Evaluate amount of meals eaten  - Assist patient with eating if necessary   - Allow adequate time for meals  - Recommend/ encourage appropriate diets, oral nutritional supplements, and vitamin/mineral supplements  - Order, calculate, and assess calorie counts as needed  - Recommend, monitor, and adjust tube feedings and TPN/PPN based on assessed needs  - Assess need for intravenous fluids  - Provide specific nutrition/hydration education as appropriate  - Include patient/family/caregiver in decisions related to nutrition  Outcome: Progressing

## 2021-08-18 NOTE — ASSESSMENT & PLAN NOTE
Lab Results   Component Value Date    EGFR 45 08/18/2021    EGFR 47 08/17/2021    EGFR 45 08/16/2021    CREATININE 1 61 (H) 08/18/2021    CREATININE 1 57 (H) 08/17/2021    CREATININE 1 62 (H) 08/16/2021     Baseline creatinine 1 1-1 4  Creatinine slightly above baseline and stable around 1 6 today  Monitor renal function closely with diuresis

## 2021-08-18 NOTE — CASE MANAGEMENT
Case Management Progress Note    Patient name Quique Nolan  Location /577-96 MRN [id number]  : 1960 Date 2021       LOS (days): 7  Geometric Mean LOS (GMLOS) (days):   Days to GMLOS:        BUNDLE:      OBJECTIVE:  Pt is a 57y o  year old /[employer], white or  [1], male with [facility] preference of [address] admitted on  10:17 AM  Pt is admitted to [facility] at [address] [place] [address] [facility] with complaints of Septic shock ([facility] [address] )   Current admission status: Inpatient  Preferred Pharmacy:   [facility]/pharmacy #[id number] [address], [address] [place] [address]  Phone: [phone] Fax: [phone]    Primary Care Provider: Sulaiman Munoz DO    Primary Insurance: [address] [place] [address]  Secondary Insurance:     PROGRESS NOTE:  As per the physician during inner disciplinary discharge planning meeting the patient will medically be ready for discharge  I called Darin Mosley at [facility] and notified her of possible discharge in the am and that patient needs Bipap at   I called patient's wife Sue Roberson and notified her of discharge in am with ambulance transport set up for 1230   [facility] will transport the patient at 12:30

## 2021-08-18 NOTE — ASSESSMENT & PLAN NOTE
Without fever or leukocytosis, it's unclear if findings are acute or sequale of recent covid infection   Possible aspiration vs  possible gram negative pneumonia   Received 5 days therapy of vanco/cefepime - discussed with ID, discontinued antibiotics 8/16 especially with generalized rash, suspected drug reaction  Procalcitonin, urine Legionella and pneumococcal antigens negative  Flagyl discontinued due to suspected allergy, allergy added  Patient remains stable in his respiratory status

## 2021-08-18 NOTE — ASSESSMENT & PLAN NOTE
Chronic hypotension previously on midodrine home regimen, possible septic shock now resolved  Patient was noted for worsening leukocytosis, new fever and worsening hypotension   Lactic acid normal  Repeat Blood cultures obtained x 2 8/14/21 no growth 48 hours  CT chest / abdomen / pelvis reviewed - no clear focal infection identified  The patient completed 5 days of antibiotic therapy for possible pneumonia, discussed with ID, input appreciated, discontinued antibiotics 8/16  Central line placed by surgical team, now replaced with midline by IR 8/18  Midodrine restarted, weaned off Neosynepherine 8/15/21  Remains stable

## 2021-08-18 NOTE — ASSESSMENT & PLAN NOTE
Continue to hold cardizem due to hypotension  Continue metoprolol   Eliquis for [facility]

## 2021-08-18 NOTE — PROGRESS NOTES
[address] [facility] [address]  Progress Note - Leslie Villafana 1960, 57 y o  male MRN: [id number]  Unit/Bed#: 410-01 Encounter: [id number]  Primary Care Provider: Sandra Millan DO   Date and time admitted to hospital: 8/11/2021 10:17 AM    * Septic shock [facility])  Assessment & Plan  Chronic hypotension previously on midodrine home regimen, possible septic shock now resolved  Patient was noted for worsening leukocytosis, new fever and worsening hypotension   Lactic acid normal  Repeat Blood cultures obtained x 2 8/14/21 no growth 48 hours  CT chest / abdomen / pelvis reviewed - no clear focal infection identified  The patient completed 5 days of antibiotic therapy for possible pneumonia, discussed with ID, input appreciated, discontinued antibiotics 8/16  Central line placed by surgical team, now replaced with midline by IR 8/18  Midodrine restarted, weaned off Neosynepherine 8/15/21  Remains stable     Acute cystitis with hematuria  Assessment & Plan  Ruled out  Urine culture negative     Multifocal pneumonia  Assessment & Plan  Without fever or leukocytosis, it's unclear if findings are acute or sequale of recent covid infection   Possible aspiration vs  possible gram negative pneumonia   Received 5 days therapy of vanco/cefepime - discussed with ID, discontinued antibiotics 8/16 especially with generalized rash, suspected drug reaction  Procalcitonin, urine Legionella and pneumococcal antigens negative  Flagyl discontinued due to suspected allergy, allergy added  Patient remains stable in his respiratory status       Acute on chronic diastolic (congestive) heart failure (HCC)  Assessment & Plan  Wt Readings from Last 3 Encounters:   08/18/21 (!) 162 kg (357 lb 2 3 oz)   07/14/21 (!) 155 kg (342 lb 3 2 oz)   03/04/21 (!) 153 kg (338 lb 3 oz)     Chronically on bumex 1mg BID - was held initially now resumed and increased dose to 2 mg b i d  by Nephrology   Diuretic therapy per Nephrology   Monitor daily weights, strict I's and O's      Acute on chronic respiratory failure with hypoxia and hypercapnia (HCC)  Assessment & Plan  Pulmonary input appreciated  Acute component has resolved, patient saturating well on baseline utilization 2 L nasal cannula    Acute kidney injury superimposed on chronic kidney disease [facility])  Assessment & Plan  Lab Results   Component Value Date    EGFR 45 08/18/2021    EGFR 47 08/17/2021    EGFR 45 08/16/2021    CREATININE 1 61 (H) 08/18/2021    CREATININE 1 57 (H) 08/17/2021    CREATININE 1 62 (H) 08/16/2021     Baseline creatinine 1 1-1 4  Creatinine slightly above baseline and stable around 1 6 today  Monitor renal function closely with diuresis     Chronic anemia  Assessment & Plan  Baseline hemoglobin around 8-10, remains at baseline     Paroxysmal atrial fibrillation (HCC)  Assessment & Plan  Continue to hold cardizem due to hypotension  Continue metoprolol   Eliquis for [facility]    Dysphagia  Assessment & Plan  History of dysphagia with CT concerning aspiration  Modified diet instituted after speech evaluation    Drug rash  Assessment & Plan  This was initally felt due to flagyl, and there was concern for DRESS syndrome, however after review with critical care and ID, the patient has had eosinophilia in the past    - Flagyl has been discontinued, patient has on been cefepime and vancomycin with rash continuing to worsen - discontinue antibiotics with the patient having completed a 5-day course  - continue serial examinations      Metabolic encephalopathy  Assessment & Plan  Multifactorial and appears resolved to baseline  Ongoing update of family          VTE Pharmacologic Prophylaxis:   Pharmacologic: Apixaban (Eliquis)  Mechanical VTE Prophylaxis in Place: Yes    Patient Centered Rounds: I have performed bedside rounds with nursing staff today      Discussions with Specialists or Other Care Team Provider:  Multidisciplinary meeting    Education and Discussions with Family / Patient: Ongoing update of wife    Time Spent for Care: 45 minutes  More than 50% of total time spent on counseling and coordination of care as described above  Current Length of Stay: 7 day(s)    Current Patient Status: Inpatient   Certification Statement: The patient will continue to require additional inpatient hospital stay due to Close monitoring    Discharge Plan:  1-2 days    Code Status: Level 1 - Full Code      Subjective:    patient seen and examined  He reports pruritic generalized rash  No other complaints, no overnight events  Objective:     Vitals:   Temp (24hrs), Av °F (36 7 °C), Min:97 8 °F (36 6 °C), Max:98 2 °F (36 8 °C)    Temp:  [97 8 °F (36 6 °C)-98 2 °F (36 8 °C)] 97 9 °F (36 6 °C)  HR:  [55-76] 55  Resp:  [16-23] 16  BP: (109-125)/(60-65) 125/65  SpO2:  [96 %-99 %] 99 %  Body mass index is 49 81 kg/m²  Input and Output Summary (last 24 hours): Intake/Output Summary (Last 24 hours) at 2021 1412  Last data filed at 2021 1245  Gross per 24 hour   Intake 600 ml   Output 625 ml   Net -25 ml       Physical Exam:     Physical Exam  Constitutional:       General: He is not in acute distress  HENT:      Head: Normocephalic and atraumatic  Nose: No congestion  Cardiovascular:      Rate and Rhythm: Normal rate and regular rhythm  Heart sounds: No murmur heard  Pulmonary:      Effort: No respiratory distress  Breath sounds: No wheezing or rales  Abdominal:      General: There is no distension  Tenderness: There is no abdominal tenderness  There is no guarding  Skin:     Findings: Rash (Generalized erythematous pruritic maculopapular rash improving upper extremities and torso, still present bilateral lower extremities) present  Neurological:      Mental Status: Mental status is at baseline     Psychiatric:         Mood and Affect: Mood normal            Additional Data:     Labs:    Results from last 7 days   Lab Units 21  0654 21  8053 08/16/21  0506   WBC Thousand/uL 10 71* 12 56* 15 81*   HEMOGLOBIN g/dL 8 5* 8 3* 8 2*   HEMATOCRIT % 32 5* 30 9* 31 2*   PLATELETS Thousands/uL 214 207 172   BANDS PCT %  --  2  --    NEUTROS PCT %  --   --  84*   LYMPHS PCT %  --   --  3*   LYMPHO PCT %  --  6*  --    MONOS PCT %  --   --  5   MONO PCT %  --  6  --    EOS PCT %  --  11* 7*     Results from last 7 days   Lab Units 08/18/21  0654 08/16/21  0506   SODIUM mmol/L 144 140   POTASSIUM mmol/L 3 6 3 0*   CHLORIDE mmol/L 106 104   CO2 mmol/L 34* 32   BUN mg/dL 44* 36*   CREATININE mg/dL 1 61* 1 62*   ANION GAP mmol/L 4 4   CALCIUM mg/dL 8 8 8 2*   ALBUMIN g/dL  --  2 9*   TOTAL BILIRUBIN mg/dL  --  0 22   ALK PHOS U/L  --  64   ALT U/L  --  14   AST U/L  --  8   GLUCOSE RANDOM mg/dL 119 147*                 Results from last 7 days   Lab Units 08/14/21  1002 08/13/21  0558 08/12/21  0444   LACTIC ACID mmol/L 0 8  --   --    PROCALCITONIN ng/ml  --  0 18 0 07           * I Have Reviewed All Lab Data Listed Above  * Additional Pertinent Lab Tests Reviewed: [address] Admission Reviewed    Imaging:    Imaging Reports Reviewed Today Include: no new      Recent Cultures (last 7 days):     Results from last 7 days   Lab Units 08/14/21  1002 08/13/21  1323 08/11/21  1749   BLOOD CULTURE  No Growth at 72 hrs    No Growth at 72 hrs   --   --    URINE CULTURE   --  No Growth <1000 cfu/mL  --    LEGIONELLA URINARY ANTIGEN   --   --  Negative       Last 24 Hours Medication List:   Current Facility-Administered Medications   Medication Dose Route Frequency Provider Last Rate    acetaminophen  650 mg Oral Q6H PRN Alanna Smyth MD      albuterol  2 5 mg Nebulization Q4H PRN Alanna Smyht MD      allopurinol  100 mg Oral Daily Alanna Smyth MD      apixaban  2 5 mg Oral BID Alanna Smyth MD      aspirin  81 mg Oral Daily Alanna Smyth MD      atorvastatin  40 mg Oral Daily With Chely García MD      b complex-vitamin C-folic acid  1 capsule Oral Daily With Edgard Feng MD      bumetanide  2 mg Oral BID Jermaine Carson MD      diphenhydrAMINE  25 mg Oral Q6H PRN Jermaine Carson MD      hydrocortisone sodium succinate  50 mg Intravenous Q8H [facility] Jermaine Carson MD      magnesium oxide  400 mg Oral Daily Jermaine Carson MD      metoprolol tartrate  25 mg Oral TID Jermaine Carson MD      midodrine  10 mg Oral TID AC Jermaine Carson MD      oxyCODONE  5 mg Oral Q4H PRN Jermaine Carson MD      pantoprazole  40 mg Oral BID AC Jermaine Carson MD      sertraline  100 mg Oral Daily Jermaine Carson MD          Today, Patient Was Seen By: Wayne Blanco MD    ** Please Note: Dictation voice to text software may have been used in the creation of this document   **

## 2021-08-18 NOTE — ASSESSMENT & PLAN NOTE
This was initally felt due to flagyl, and there was concern for DRESS syndrome, however after review with critical care and ID, the patient has had eosinophilia in the past    - Flagyl has been discontinued, patient has on been cefepime and vancomycin with rash continuing to worsen - discontinue antibiotics with the patient having completed a 5-day course  - continue serial examinations

## 2021-08-18 NOTE — ASSESSMENT & PLAN NOTE
Wt Readings from Last 3 Encounters:   08/18/21 (!) 162 kg (357 lb 2 3 oz)   07/14/21 (!) 155 kg (342 lb 3 2 oz)   03/04/21 (!) 153 kg (338 lb 3 oz)     Chronically on bumex 1mg BID - was held initially now resumed and increased dose to 2 mg b i d  by Nephrology   Diuretic therapy per Nephrology   Monitor daily weights, strict I's and O's

## 2021-08-18 NOTE — PROGRESS NOTES
Progress Note - Pulmonary   Jeff Montaño 57 y o  male MRN: [id number]  Unit/Bed#: 410-01 Encounter: [id number]    Assessment/Plan:    1  Acute on chronic hypoxic and hypercapnic respiratory failure   1  Currently on 2 L nasal cannula oxygen requirement is 2 L nasal cannula continuously  2  Titrate oxygen to maintain SpO2 greater than or equal to 88%   3  Pulmonary toilet: Increase activity as tolerated, out of bed as tolerated, cough and deep breathing as encouraged, incentive spirometry q 1 hour  4  Continue BiPAP 16/8 during all hours of sleep-  patient tolerated well overnight without issues   2  Sepsis secondary to aspiration pneumonia   1  Imaging shows right lower lobe infiltrate suggestive of recurrent aspirations  2  Completed 5 days of cefepime and vancomycin- ID recommending discontinuing ABX today and monitor clinical response, remains stable today- will continue to monitor off ABX  3  Stopped Flagyl 8/13/21 due to possible allergic reaction (noted to have eosinophilia on differential)  4  Trend WBC, procalcitonin, temps  5  Speech evaluation revealed mild to moderate or pharyngeal decision recommend soft/level 3 diet and nectar thick liquids  6  Continue aspiration precautions  7  Of note, patient has chronic hypotension and has now been weaned off vasopressors and restarted on his midodrine  8  Check sputum- unable to obtain today  3  Drug rash   1  Improving   2  Thought to be caused by Flagyl then chlorhexidine then possible other ABX- now improving   3  Monitor off ABX per ID recommendations    4  OHS/ANGELINA in the setting of morbid obesity   1  Recommend BiPAP while inpatient  2  Resume home BIPAP at nursing home  3  Will need compliance report to review at next follow up to determine whether or not we need to make changes  5    Acute on chronic diastolic CHF  1    continue diuretics per primary team /Nephrology  2  Monitor I/ O and daily weights  6  Metabolic encephalopathy   1   Back at baseline 2  Secondary to hypercapnia vs UTI   3  CT head negative  4  Continue to monitor- resolved, back at baseline   7  History of COVID-19 pneumonia underlying acute encephalopathy  1  With previous prolonged hospitalization   2  No need for COVID directed therapies at this time      patient is stable from pulmonary standpoint  Will follow-up her call questions  Chief Complaint:    "I'm fine "    Subjective:      Patient seen examined today  No overnight events reported  Patient any worsening respiratory symptoms  No worsening shortness of breath, dyspnea on exertion, cough, sputum production, hemoptysis or wheeze  No chest pain or palpitations  He is itchy from his rash  Objective:    Vitals: Blood pressure 125/65, pulse 55, temperature 97 9 °F (36 6 °C), resp  rate 16, height 5' 11" (1 803 m), weight (!) 162 kg (357 lb 2 3 oz), SpO2 99 %  2L NC,Body mass index is 49 81 kg/m²  Intake/Output Summary (Last 24 hours) at 8/18/2021 1150  Last data filed at 8/18/2021 0301  Gross per 24 hour   Intake 240 ml   Output 625 ml   Net -385 ml       Invasive Devices     Peripheral Intravenous Line            Long-Dwell Peripheral IV (Midline) 62/62/70 Right Basilic <1 day                Physical Exam:     Physical Exam  Vitals and nursing note reviewed  Constitutional:       General: He is not in acute distress  Appearance: Normal appearance  He is obese  HENT:      Head: Normocephalic and atraumatic  Right Ear: External ear normal       Left Ear: External ear normal       Nose: Nose normal       Mouth/Throat:      Mouth: Mucous membranes are moist       Pharynx: Oropharynx is clear  Eyes:      Extraocular Movements: Extraocular movements intact  Conjunctiva/sclera: Conjunctivae normal       Pupils: Pupils are equal, round, and reactive to light  Cardiovascular:      Rate and Rhythm: Normal rate and regular rhythm  Pulses: Normal pulses  Heart sounds: No murmur heard       Pulmonary: Effort: Pulmonary effort is normal  No respiratory distress  Breath sounds: No wheezing, rhonchi or rales  Abdominal:      General: Bowel sounds are normal       Palpations: Abdomen is soft  There is no mass  Tenderness: There is no abdominal tenderness  Hernia: No hernia is present  Musculoskeletal:         General: No tenderness or deformity  Normal range of motion  Cervical back: Normal range of motion and neck supple  No muscular tenderness  Right lower leg: Edema present  Left lower leg: Edema present  Skin:     General: Skin is warm and dry  Neurological:      General: No focal deficit present  Mental Status: He is alert and oriented to person, place, and time  Mental status is at baseline  Psychiatric:         Mood and Affect: Mood normal          Behavior: Behavior normal          Thought Content: Thought content normal          Judgment: Judgment normal          Labs: I have personally reviewed pertinent lab results  , ABG: No results found for: PHART, QHU5EUA, PO2ART, NQV6JLT, E9LZKHXX, BEART, SOURCE, BNP: No results found for: BNP, CBC:   Lab Results   Component Value Date    WBC 10 71 (H) 08/18/2021    HGB 8 5 (L) 08/18/2021    HCT 32 5 (L) 08/18/2021    MCV 84 08/18/2021     08/18/2021    MCH 22 1 (L) 08/18/2021    MCHC 26 2 (L) 08/18/2021    RDW 21 1 (H) 08/18/2021    MPV 10 9 08/18/2021    NRBC 0 08/18/2021   , CMP:   Lab Results   Component Value Date    SODIUM 144 08/18/2021    K 3 6 08/18/2021     08/18/2021    CO2 34 (H) 08/18/2021    BUN 44 (H) 08/18/2021    CREATININE 1 61 (H) 08/18/2021    CALCIUM 8 8 08/18/2021    EGFR 45 08/18/2021   , PT/INR: No results found for: PT, INR, Troponin: No results found for: TROPONINI      Imaging and other studies: none to review

## 2021-08-19 VITALS
HEART RATE: 62 BPM | RESPIRATION RATE: 16 BRPM | OXYGEN SATURATION: 99 % | SYSTOLIC BLOOD PRESSURE: 121 MMHG | WEIGHT: 315 LBS | HEIGHT: 71 IN | DIASTOLIC BLOOD PRESSURE: 55 MMHG | BODY MASS INDEX: 44.1 KG/M2 | TEMPERATURE: 98.1 F

## 2021-08-19 LAB
ANION GAP SERPL CALCULATED.3IONS-SCNC: 6 MMOL/L (ref 4–13)
ANISOCYTOSIS BLD QL SMEAR: PRESENT
BASO STIPL BLD QL SMEAR: PRESENT
BASOPHILS # BLD MANUAL: 0.21 THOUSAND/UL (ref 0–0.1)
BASOPHILS NFR MAR MANUAL: 2 % (ref 0–1)
BUN SERPL-MCNC: 48 MG/DL (ref 5–25)
CALCIUM SERPL-MCNC: 8.6 MG/DL (ref 8.3–10.1)
CHLORIDE SERPL-SCNC: 105 MMOL/L (ref 100–108)
CO2 SERPL-SCNC: 35 MMOL/L (ref 21–32)
CREAT SERPL-MCNC: 1.47 MG/DL (ref 0.6–1.3)
EOSINOPHIL # BLD MANUAL: 0.64 THOUSAND/UL (ref 0–0.4)
EOSINOPHIL NFR BLD MANUAL: 6 % (ref 0–6)
ERYTHROCYTE [DISTWIDTH] IN BLOOD BY AUTOMATED COUNT: 21.1 % (ref 11.6–15.1)
GFR SERPL CREATININE-BSD FRML MDRD: 50 ML/MIN/1.73SQ M
GLUCOSE SERPL-MCNC: 103 MG/DL (ref 65–140)
HCT VFR BLD AUTO: 33 % (ref 36.5–49.3)
HGB BLD-MCNC: 8.7 G/DL (ref 12–17)
LG PLATELETS BLD QL SMEAR: PRESENT
LYMPHOCYTES # BLD AUTO: 1.59 THOUSAND/UL (ref 0.6–4.47)
LYMPHOCYTES # BLD AUTO: 15 % (ref 14–44)
MAGNESIUM SERPL-MCNC: 2.4 MG/DL (ref 1.6–2.6)
MCH RBC QN AUTO: 22 PG (ref 26.8–34.3)
MCHC RBC AUTO-ENTMCNC: 26.4 G/DL (ref 31.4–37.4)
MCV RBC AUTO: 84 FL (ref 82–98)
MONOCYTES # BLD AUTO: 0.85 THOUSAND/UL (ref 0–1.22)
MONOCYTES NFR BLD: 8 % (ref 4–12)
NEUTROPHILS # BLD MANUAL: 7 THOUSAND/UL (ref 1.85–7.62)
NEUTS BAND NFR BLD MANUAL: 2 % (ref 0–8)
NEUTS SEG NFR BLD AUTO: 64 % (ref 43–75)
NRBC BLD AUTO-RTO: 1 /100 WBC (ref 0–2)
OVALOCYTES BLD QL SMEAR: PRESENT
PLATELET # BLD AUTO: 225 THOUSANDS/UL (ref 149–390)
PLATELET BLD QL SMEAR: ADEQUATE
PMV BLD AUTO: 11.1 FL (ref 8.9–12.7)
POTASSIUM SERPL-SCNC: 3.1 MMOL/L (ref 3.5–5.3)
RBC # BLD AUTO: 3.95 MILLION/UL (ref 3.88–5.62)
SARS-COV-2 RNA RESP QL NAA+PROBE: NEGATIVE
SODIUM SERPL-SCNC: 146 MMOL/L (ref 136–145)
VARIANT LYMPHS # BLD AUTO: 3 %
WBC # BLD AUTO: 10.6 THOUSAND/UL (ref 4.31–10.16)

## 2021-08-19 PROCEDURE — U0003 INFECTIOUS AGENT DETECTION BY NUCLEIC ACID (DNA OR RNA); SEVERE ACUTE RESPIRATORY SYNDROME CORONAVIRUS 2 (SARS-COV-2) (CORONAVIRUS DISEASE [COVID-19]), AMPLIFIED PROBE TECHNIQUE, MAKING USE OF HIGH THROUGHPUT TECHNOLOGIES AS DESCRIBED BY CMS-2020-01-R: HCPCS | Performed by: FAMILY MEDICINE

## 2021-08-19 PROCEDURE — 99239 HOSP IP/OBS DSCHRG MGMT >30: CPT | Performed by: FAMILY MEDICINE

## 2021-08-19 PROCEDURE — 85027 COMPLETE CBC AUTOMATED: CPT | Performed by: FAMILY MEDICINE

## 2021-08-19 PROCEDURE — 85007 BL SMEAR W/DIFF WBC COUNT: CPT | Performed by: FAMILY MEDICINE

## 2021-08-19 PROCEDURE — 80048 BASIC METABOLIC PNL TOTAL CA: CPT | Performed by: FAMILY MEDICINE

## 2021-08-19 PROCEDURE — U0005 INFEC AGEN DETEC AMPLI PROBE: HCPCS | Performed by: FAMILY MEDICINE

## 2021-08-19 PROCEDURE — 92526 ORAL FUNCTION THERAPY: CPT

## 2021-08-19 PROCEDURE — 83735 ASSAY OF MAGNESIUM: CPT | Performed by: FAMILY MEDICINE

## 2021-08-19 PROCEDURE — 94760 N-INVAS EAR/PLS OXIMETRY 1: CPT

## 2021-08-19 RX ORDER — DIPHENHYDRAMINE HCL 25 MG
25 TABLET ORAL EVERY 6 HOURS PRN
Qty: 30 TABLET | Refills: 0 | Status: SHIPPED | OUTPATIENT
Start: 2021-08-19

## 2021-08-19 RX ORDER — HYDROCORTISONE 10 MG/1
TABLET ORAL
Refills: 0
Start: 2021-08-19 | End: 2021-08-31

## 2021-08-19 RX ORDER — HYDROCORTISONE 10 MG/1
40 TABLET ORAL 3 TIMES DAILY
Refills: 0
Start: 2021-08-19 | End: 2021-08-19 | Stop reason: SDUPTHER

## 2021-08-19 RX ORDER — POTASSIUM CHLORIDE 20 MEQ/1
40 TABLET, EXTENDED RELEASE ORAL ONCE
Status: COMPLETED | OUTPATIENT
Start: 2021-08-19 | End: 2021-08-19

## 2021-08-19 RX ADMIN — SERTRALINE HYDROCHLORIDE 100 MG: 100 TABLET ORAL at 09:03

## 2021-08-19 RX ADMIN — ASPIRIN 81 MG: 81 TABLET, COATED ORAL at 09:05

## 2021-08-19 RX ADMIN — ALLOPURINOL 100 MG: 100 TABLET ORAL at 09:03

## 2021-08-19 RX ADMIN — BUMETANIDE 2 MG: 1 TABLET ORAL at 09:03

## 2021-08-19 RX ADMIN — HYDROCORTISONE SODIUM SUCCINATE 50 MG: 100 INJECTION, POWDER, FOR SOLUTION INTRAMUSCULAR; INTRAVENOUS at 06:19

## 2021-08-19 RX ADMIN — PANTOPRAZOLE SODIUM 40 MG: 40 TABLET, DELAYED RELEASE ORAL at 06:19

## 2021-08-19 RX ADMIN — MIDODRINE HYDROCHLORIDE 10 MG: 5 TABLET ORAL at 06:19

## 2021-08-19 RX ADMIN — POTASSIUM CHLORIDE 40 MEQ: 1500 TABLET, EXTENDED RELEASE ORAL at 10:38

## 2021-08-19 RX ADMIN — MAGNESIUM OXIDE TAB 400 MG (241.3 MG ELEMENTAL MG) 400 MG: 400 (241.3 MG) TAB at 09:03

## 2021-08-19 RX ADMIN — MIDODRINE HYDROCHLORIDE 10 MG: 5 TABLET ORAL at 10:38

## 2021-08-19 RX ADMIN — APIXABAN 2.5 MG: 2.5 TABLET, FILM COATED ORAL at 09:03

## 2021-08-19 RX ADMIN — METOPROLOL TARTRATE 25 MG: 25 TABLET, FILM COATED ORAL at 09:03

## 2021-08-19 NOTE — NURSING NOTE
Pt discharged back to [facility]  Midline site removed, intact 20cm in length  No belongings with him  Escorted off floor via stretcher, discharge paperwork given to transport personnel  Report called to Manan Mays, supervisor at [facility]

## 2021-08-19 NOTE — SPEECH THERAPY NOTE
Speech/Language Pathology Progress Note    Patient Name: Rob DAMONBMIKE Date: 8/19/2021     Subjective:  Pt awake and alert, positioned upright in bed, slight lean to R side  Currently on 3L O2 via nasal cannula  Objective:  Pt seen for diagnostic swallow therapy  Current diet is dysphagia 3 with nectar thick liquids  He has a hx of silent aspiration on VBS, therefore thin liquids not recommended  Pt was seen during lunch, which included sliced beef, cooked veggies, and mashed potatoes  Pt was able to feed self, noted to have a fast/impulsive rate of intake  When cued to slow down, pt stated he has always eaten too fast and did not slow his rate of intake  Suspect incomplete mastication prior to swallow  Large consecutive gulps of NTL were taken  Despite fast rate of intake, no s/s aspiration noted with current modified diet  Assessment:  Due to fast rate of intake and hx of silent aspiration pt is recommended to continue with current diet  Should he desire to resume thin liquids, he should be seen for repeat VBS as an outpatient  Plan/Recommendations:  Continue dysphagia 3 diet and NTL  Pt is for d/c today to [facility]  Consider VBS as outpatient prior to advancing to thin liquids

## 2021-08-19 NOTE — CASE MANAGEMENT
Case Management Discharge Planning Note    Patient name Chiquita Olp  Location /458-23 MRN [id number]  : 1960 Date 2021       Current Admission Date: 2021  Current Admission Diagnosis:  Septic shock [facility])   Patient Active Problem List   Diagnosis    Aortic stenosis, mild    Essential hypertension    Hyperlipidemia    Left bundle branch block    Hypokalemia    Murmur    Osteoarthritis of both knees    Pericardial disease    Sciatica    Age-related cataract of right eye    Venous insufficiency    Bilateral leg edema    Stress-induced cardiomyopathy    History of aortic valve replacement with bioprosthetic valve    Persistent atrial fibrillation (HCC)    Septic shock (HCC)    Leukocytosis    Metabolic encephalopathy    Drug rash    Coagulopathy (HCC)    Age-related nuclear cataract, bilateral    Open angle with borderline findings, low risk, bilateral    Presence of intraocular lens    Vitreous degeneration of both eyes    Left arm swelling    Dysphagia    Left internal jugular vein thrombus    Morbid obesity     Depression    Gastric ulcer    Paroxysmal atrial fibrillation (HCC)    COVID-19    Acute kidney injury superimposed on CKD (HCC)    Chronic anemia    Hypernatremia    Seizure (HCC)    History of stroke    Acute kidney injury superimposed on chronic kidney disease ([address] )    H/O heart valve replacement with tissue graft    Lower extremity edema    Acute on chronic respiratory failure with hypoxia and hypercapnia (HCC)    Obesity hypoventilation syndrome (HCC)    Acute on chronic diastolic (congestive) heart failure (HCC)    Multifocal pneumonia    Acute cystitis with hematuria    Previous Admission - Discharge Date:3/4/21   LOS (days): 8  Geometric Mean LOS (GMLOS) (days):   Days to GMLOS: Previous Discharge Diagnosis:  There are no discharge diagnoses documented for the most recent discharge       Risk of Unplanned Readmission Score  Predictive Model Details          26 (Low)  Factor Value    Calculated 8/19/2021 12:03 23% Number of active Rx orders 40    Risk of Unplanned Readmission Model 8% ECG/EKG order present in last 6 months     7% Latest BUN high (48 mg/dL)     6% Encounter of ten days or longer in last year present     6% Current length of stay 7 917 days     6% Diagnosis of electrolyte disorder present     5% Imaging order present in last 6 months     5% Latest hemoglobin low (8 7 g/dL)     5% Number of ED visits in last six months 1     4% Age 57     4% Number of hospitalizations in last year 1     4% Diagnosis of deficiency anemia present     4% Active anticoagulant Rx order present     4% Active corticosteroid Rx order present     4% Latest creatinine high (1 47 mg/dL)     3% Diagnosis of renal failure present     2% Charlson Comorbidity Index 2     2% Future appointment scheduled     1% Active ulcer medication Rx order present         BUNDLE:      OBJECTIVE:  Pt is a 57y o  year old /[employer], white or  [1], male with Sikhism preference of Rastafari admitted on 0/72/9958 10:17 AM  Pt is admitted to [facility] at [address] [place] [address] with complaints of Septic shock ([facility] [address] )   Current admission status: Inpatient  Preferred Pharmacy:   [facility]/pharmacy #[id number]- [place] [address] [place] [address]  Phone: [phone] Fax: [phone]    Primary Care Provider: Hemanth Gu DO    Primary Insurance: [address] [place] [address]  Secondary Insurance:     DISCHARGE DETAILS:    Discharge planning discussed with[de-identified] Pt's wife  Freedom of Choice: Yes   Pt is being discharged today   Pt will return to [address] ambulance will pick the patient up at 12:30 pm   They were notified he is a bariatric transport  I notified patient's wife and Coty Fearing at [place] of transport time   I in basket messaged Nephrology to call for a follow up appointment at [address] [place] [address]   Pt has a follow up with Pulmonary already arranged                                                                 IMM Given (Date):: 08/18/21  IMM Given to[de-identified] Family (I reviewed IMM on phone with patient's wife Keyur Rodriguez)

## 2021-08-19 NOTE — RESPIRATORY THERAPY NOTE
08/18/21 0830   Respiratory Assessment   Assessment Type Assess only   General Appearance Sleeping   Respiratory Pattern Dyspnea with exertion   Chest Assessment Chest expansion symmetrical;Trachea midline   Bilateral Breath Sounds Diminished   Resp Comments Patient placed on bipap without incident  He was on his 3 lpm nasal cannula  Machine was wiped down  BS: no wheezing   Patient does not c/o sob   Non-Invasive Information   O2 Interface Device Full face mask   Non-Invasive Ventilation Mode BiPAP   SpO2 96 %   $ Pulse Oximetry Spot Check Charge Completed   Non-Invasive Settings   IPAP (cm) 16 cm   EPAP (cm) 8 cm   Rate (Set) 8   FiO2 (%) 30   Pressure Support (cm H2O) 8   Rise Time 3   Inspiratory Time (Set) 1   Non-Invasive Readings   Total Rate 21   Vt (mL) (Mech) 765   MV (Mech) 14 2   Peak Pressure (Obs) 16   Skin Intervention Mask rotated;Skin intact   Non-Invasive Alarms   Insp Pressure High (cm H20) 24   Insp Pressure Low (cm H20) 4   Low Insp Pressure Time (sec) 20 sec   MV Low (L/min) 4   Vt High (mL) 1500   Vt Low (mL) 200   High Resp Rate (BPM) 40 BPM   Low Resp Rate (BPM) 6 BPM   Apnea Interval (sec) 20   Apnea Volume (mL) 10

## 2021-08-20 LAB
BACTERIA BLD CULT: NORMAL
BACTERIA BLD CULT: NORMAL

## 2021-08-20 NOTE — ASSESSMENT & PLAN NOTE
This was initally felt due to flagyl, and there was concern for DRESS syndrome, however after review with critical care and ID, the patient has had eosinophilia in the past    - Flagyl has been discontinued, patient has on been cefepime and vancomycin with rash continuing to worsen - discontinue antibiotics with the patient having completed a 5-day course  - rash continues to improve  - complete steroid taper on discharge

## 2021-08-20 NOTE — ASSESSMENT & PLAN NOTE
Wt Readings from Last 3 Encounters:   08/19/21 (!) 162 kg (357 lb 12 9 oz)   07/14/21 (!) 155 kg (342 lb 3 2 oz)   03/04/21 (!) 153 kg (338 lb 3 oz)     Chronically on bumex 1mg BID - continue on discharge   Diuretic therapy per Nephrology   Monitor daily weights, strict I's and O's

## 2021-08-20 NOTE — ASSESSMENT & PLAN NOTE
Chronic hypotension previously on midodrine home regimen, possible septic shock now resolved  Patient was noted for worsening leukocytosis, new fever and worsening hypotension   Lactic acid normal  Repeat Blood cultures obtained x 2 8/14/21 no growth 48 hours  CT chest / abdomen / pelvis reviewed - no clear focal infection identified  The patient completed 5 days of antibiotic therapy for possible pneumonia, discussed with ID, input appreciated, discontinued antibiotics 8/16  Central line placed by surgical team, now replaced with midline by IR 8/18  Midodrine restarted, weaned off Neosynepherine 8/15/21  Remains stable and appropriate for discharge back to nursing home facility

## 2021-08-20 NOTE — DISCHARGE SUMMARY
[address] [facility] [address]  Discharge- Yanna Chu 1960, 57 y o  male MRN: [id number]  Unit/Bed#: 410-01 Encounter: [id number]  Primary Care Provider: Rosana Pond DO   Date and time admitted to hospital: 8/11/2021 10:17 AM    * Septic shock [facility])  Assessment & Plan  Chronic hypotension previously on midodrine home regimen, possible septic shock now resolved  Patient was noted for worsening leukocytosis, new fever and worsening hypotension   Lactic acid normal  Repeat Blood cultures obtained x 2 8/14/21 no growth 48 hours  CT chest / abdomen / pelvis reviewed - no clear focal infection identified  The patient completed 5 days of antibiotic therapy for possible pneumonia, discussed with ID, input appreciated, discontinued antibiotics 8/16  Central line placed by surgical team, now replaced with midline by IR 8/18  Midodrine restarted, weaned off Neosynepherine 8/15/21  Remains stable and appropriate for discharge back to nursing home facility    Acute cystitis with hematuria  Assessment & Plan  Ruled out  Urine culture negative     Multifocal pneumonia  Assessment & Plan  Without fever or leukocytosis, it's unclear if findings are acute or sequale of recent covid infection   Possible aspiration vs  possible gram negative pneumonia   Received 5 days therapy of vanco/cefepime - discussed with ID, discontinued antibiotics 8/16 especially with generalized rash, suspected drug reaction  Procalcitonin, urine Legionella and pneumococcal antigens negative  Flagyl discontinued due to suspected allergy, allergy added  Patient remains stable at baseline respiratory status     Acute on chronic diastolic (congestive) heart failure (HCC)  Assessment & Plan  Wt Readings from Last 3 Encounters:   08/19/21 (!) 162 kg (357 lb 12 9 oz)   07/14/21 (!) 155 kg (342 lb 3 2 oz)   03/04/21 (!) 153 kg (338 lb 3 oz)     Chronically on bumex 1mg BID - continue on discharge   Diuretic therapy per Nephrology   Monitor daily weights, strict I's and O's      Acute on chronic respiratory failure with hypoxia and hypercapnia (HCC)  Assessment & Plan  Pulmonary input appreciated  Acute component has resolved, patient saturating well on baseline utilization 2 L nasal cannula    Acute kidney injury superimposed on chronic kidney disease [facility])  Assessment & Plan  Lab Results   Component Value Date    EGFR 50 08/19/2021    EGFR 45 08/18/2021    EGFR 47 08/17/2021    CREATININE 1 47 (H) 08/19/2021    CREATININE 1 61 (H) 08/18/2021    CREATININE 1 57 (H) 08/17/2021     Baseline creatinine 1 1-1 4  Creatinine at baseline on discharge  Monitor renal function closely with diuresis     Chronic anemia  Assessment & Plan  Baseline hemoglobin around 8-10, remains at baseline     Paroxysmal atrial fibrillation (HCC)  Assessment & Plan  Continue to hold cardizem due to hypotension  Continue metoprolol   Eliquis for [facility]    Dysphagia  Assessment & Plan  History of dysphagia with CT concerning aspiration  Modified diet instituted after speech evaluation    Drug rash  Assessment & Plan  This was initally felt due to flagyl, and there was concern for DRESS syndrome, however after review with critical care and ID, the patient has had eosinophilia in the past    - Flagyl has been discontinued, patient has on been cefepime and vancomycin with rash continuing to worsen - discontinue antibiotics with the patient having completed a 5-day course  - rash continues to improve  - complete steroid taper on discharge      Metabolic encephalopathy  Assessment & Plan  Multifactorial and appears resolved to baseline  Ongoing update of family        Discharging Physician / Practitioner: Petra Avila MD  PCP: Tomma Lefort, DO  Admission Date:   Admission Orders (From admission, onward)     Ordered        08/11/21 1404  Inpatient Admission  Once                   Discharge Date: 08/19/21    Medical Problems     Resolved Problems  Date Reviewed: 8/20/2021    None Consultations During Hospital Stay:  · Nephrology, ID    Procedures Performed:   IR midline placement   Final Result by Esteban Sanabria MD (08/17 1642)   Technically successful placement of midline using sonographic guidance  This is the end of the clinically relevant portion of this report  Subsequent information is for compliance, documentation, and coding purposes  In the process of informed consent, information was communicated, verbally, to the patient regarding the procedure  The patient was informed of; the name of the procedure, nature of the procedure, nature of the condition, risks, benefits, alternatives,    and potential complications, as well as the consequences of not having the procedure  All the patient's questions were answered  Informed consent was signed  Quoted risks include infection, as well as a 5% risk of thrombosis of the entry vein  Chlorhexidine and alcohol was used for cleansing and sterile preparation of the skin  This was allowed to dry prior to the application of sterile draping  Timeout was performed, with all team members present, and in agreement  Confirmation of patient, procedure, laterally, allergies, and all needed equipment was performed  When ultrasound was used for needle entry guidance, into the vein, static and real-time images of needle entry are obtained, and archived  PROCEDURE: Mid line   PREPROCEDURE DIAGNOSIS: Please see "history ", above   POSTPROCEDURE DIAGNOSIS: Same   ANTIBIOTICS: None   SPECIMEN: None   ESTIMATED BLOOD LOSS: None   ANESTHESIA: Local            Workstation performed: [id number]         XR chest portable   Final Result by Dimple Elam MD (08/18 1600)      Patchy lateral pulmonary opacification, most likely pulmonary edema, little change since 8/14/2021                    Workstation performed: [id number]         CT chest abdomen pelvis wo contrast   Final Result by Dyllan Alfonso MD (08/15 1015)      Cardiomegaly with mild interstitial and pulmonary edema and small bilateral pleural effusions with stable right basilar subsegmental atelectasis  Distended sigmoid colon with an air-fluid level  Correlate for diarrheal illness  No evidence of bowel obstruction  Mild anasarca with scattered areas of mild inflammatory change in the subcutaneous soft tissues of the right lateral abdominal wall and left proximal thigh  Stable hepatic and renal cysts  Workstation performed: [id number]         XR chest portable   Final Result by Devante Horowitz MD (08/15 0756)      Malpositioned central catheter in the right supraclavicular region  Dr Jessica Wilcox is aware of this per his procedure note on 8/14/2021  Mild pulmonary edema with trace effusions  No pneumothorax  Workstation performed: [id number]         CT chest without contrast   Final Result by Mary Alice Gallardo MD (08/11 1400)      1  Lungs are distorted by respiratory motion artifact but there is suggestion of focally increased groundglass opacity in the left upper lobe concerning for pneumonia  Recommend follow-up chest CT in 3 months to document resolution  2   Pulmonary vascular congestion  Mild pulmonary edema is difficult to exclude given central atelectatic opacities from expiratory phase of imaging  3   Persistent peripheral consolidation in the right lower lobe when compared to January, though with resolution of previously seen dense pneumonia/mucous plugging  Chronic consolidation may represent postinfectious pleuroparenchymal scarring, but also    warrants attention on follow-up CT, recommended above  4   Ascending thoracic aortic ectasia --unchanged since January  5   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  Patient is at increased risk for aspiration  Note: The study was marked in EPIC for significant notification    Imaging follow-up reminder notification was scheduled in the electronic medical record  Workstation performed: [id number]         XR chest 1 view portable   Final Result by Wilberto Abreu MD (08/11 1211)      Right basilar and left midlung field consolidations concerning for multifocal pneumonia  The study was marked in EPIC for significant notification  Workstation performed: [id number]               Significant Findings / Test Results:   Results from last 7 days   Lab Units 08/19/21  0439   WBC Thousand/uL 10 60*   HEMOGLOBIN g/dL 8 7*   HEMATOCRIT % 33 0*   PLATELETS Thousands/uL 225     Results from last 7 days   Lab Units 08/19/21  0439   SODIUM mmol/L 146*   POTASSIUM mmol/L 3 1*   CHLORIDE mmol/L 105   CO2 mmol/L 35*   BUN mg/dL 48*   CREATININE mg/dL 1 47*   CALCIUM mg/dL 8 6         Incidental Findings:   · None     Test Results Pending at Discharge (will require follow up): · None     Outpatient Tests Requested:  · None    Complications:  None    Reason for Admission:  Hypoxia    Hospital Course:     Enrique Pretty is a 57 y o  male patient with history of stroke, AFib, and ambulatory dysfunction who originally presented to the hospital on 8/11/2021 due to hypoxia  Found to have septic shock secondary to aspiration pneumonia  Initially required ICU admission and pressors, successfully weaned off of pressors on 08/15/2021  During hospitalization the patient developed generalized rash attributed to a antibiotic reaction  Patient completed a 6 day therapy of IV antibiotics and remained hemodynamically stable and without signs of infection subsequently  The patient was at his baseline respiratory status at time of discharge  His home medications were resumed  The patient was discharged to complete steroid taper with Cortef  To follow-up with PCP and Nephrology  Please see above list of diagnoses and related plan for additional information       Condition at Discharge: stable     Discharge Day Visit / Exam:     Subjective:  Patient seen and examined  He reports feeling well and is hoping to be discharged back to facility  No complaints  Improving rash  Vitals: Blood Pressure: 121/55 (08/19/21 0702)  Pulse: 62 (08/19/21 0702)  Temperature: 98 1 °F (36 7 °C) (08/19/21 0702)  Temp Source: Temporal (08/17/21 2307)  Respirations: 16 (08/19/21 0702)  Height: 5' 11" (180 3 cm) (08/11/21 1543)  Weight - Scale: (!) 162 kg (357 lb 12 9 oz) (08/19/21 0600)  SpO2: 99 % (08/19/21 0702)  Exam:   Physical Exam  Constitutional:       General: He is not in acute distress  HENT:      Head: Normocephalic and atraumatic  Nose: No congestion  Mouth/Throat:      Pharynx: Oropharynx is clear  Eyes:      Conjunctiva/sclera: Conjunctivae normal    Cardiovascular:      Rate and Rhythm: Normal rate and regular rhythm  Heart sounds: No murmur heard  Pulmonary:      Effort: No respiratory distress  Breath sounds: No wheezing or rales  Abdominal:      General: There is no distension  Tenderness: There is no abdominal tenderness  There is no guarding  Musculoskeletal:      Right lower leg: No edema  Left lower leg: No edema  Skin:     Findings: Rash (Improving generalized rash) present  Neurological:      Mental Status: He is oriented to person, place, and time  Psychiatric:         Mood and Affect: Mood normal          Discussion with Family:  Wife    Discharge instructions/Information to patient and family:   See after visit summary for information provided to patient and family  Provisions for Follow-Up Care:  See after visit summary for information related to follow-up care and any pertinent home health orders        Disposition:     [facility] [address] to [facility] SNF:   · Not Applicable to this Patient - Not Applicable to this Patient    Planned Readmission:  No Discharge Statement:  I spent 35 minutes discharging the patient  This time was spent on the day of discharge  I had direct contact with the patient on the day of discharge  Greater than 50% of the total time was spent examining patient, answering all patient questions, arranging and discussing plan of care with patient as well as directly providing post-discharge instructions  Additional time then spent on discharge activities  Discharge Medications:  See after visit summary for reconciled discharge medications provided to patient and family        ** Please Note: This note has been constructed using a voice recognition system **

## 2021-08-20 NOTE — ASSESSMENT & PLAN NOTE
Continue to hold cardizem due to hypotension  Continue metoprolol   Eliquis for [facility] [place], operated [facility]

## 2021-08-20 NOTE — ASSESSMENT & PLAN NOTE
Lab Results   Component Value Date    EGFR 50 08/19/2021    EGFR 45 08/18/2021    EGFR 47 08/17/2021    CREATININE 1 47 (H) 08/19/2021    CREATININE 1 61 (H) 08/18/2021    CREATININE 1 57 (H) 08/17/2021     Baseline creatinine 1 1-1 4  Creatinine at baseline on discharge  Monitor renal function closely with diuresis

## 2021-08-20 NOTE — ASSESSMENT & PLAN NOTE
Without fever or leukocytosis, it's unclear if findings are acute or sequale of recent covid infection   Possible aspiration vs  possible gram negative pneumonia   Received 5 days therapy of vanco/cefepime - discussed with ID, discontinued antibiotics 8/16 especially with generalized rash, suspected drug reaction  Procalcitonin, urine Legionella and pneumococcal antigens negative  Flagyl discontinued due to suspected allergy, allergy added  Patient remains stable at baseline respiratory status

## 2021-09-13 ENCOUNTER — OFFICE VISIT (OUTPATIENT)
Dept: PULMONOLOGY | Facility: CLINIC | Age: 61
End: 2021-09-13
Payer: MEDICARE

## 2021-09-13 VITALS
SYSTOLIC BLOOD PRESSURE: 119 MMHG | OXYGEN SATURATION: 98 % | HEART RATE: 133 BPM | DIASTOLIC BLOOD PRESSURE: 89 MMHG | TEMPERATURE: 97.7 F

## 2021-09-13 DIAGNOSIS — E66.01 MORBID OBESITY (HCC): Chronic | ICD-10-CM

## 2021-09-13 DIAGNOSIS — I48.0 PAROXYSMAL ATRIAL FIBRILLATION (HCC): ICD-10-CM

## 2021-09-13 DIAGNOSIS — E66.2 OBESITY HYPOVENTILATION SYNDROME (HCC): ICD-10-CM

## 2021-09-13 DIAGNOSIS — J18.9 MULTIFOCAL PNEUMONIA: ICD-10-CM

## 2021-09-13 DIAGNOSIS — J96.12 CHRONIC RESPIRATORY FAILURE WITH HYPOXIA AND HYPERCAPNIA (HCC): Primary | ICD-10-CM

## 2021-09-13 DIAGNOSIS — J96.11 CHRONIC RESPIRATORY FAILURE WITH HYPOXIA AND HYPERCAPNIA (HCC): Primary | ICD-10-CM

## 2021-09-13 PROBLEM — I50.32 CHRONIC DIASTOLIC HEART FAILURE (HCC): Status: ACTIVE | Noted: 2021-08-11

## 2021-09-13 PROCEDURE — 99214 OFFICE O/P EST MOD 30 MIN: CPT | Performed by: PHYSICIAN ASSISTANT

## 2021-09-13 NOTE — ASSESSMENT & PLAN NOTE
Contributory to patient's dyspnea  Patient needs massive weight loss renal if the modifications including decreased caloric intake, healthier diet options, and increased activity as tolerated were all recommended

## 2021-09-13 NOTE — ASSESSMENT & PLAN NOTE
Continue 2-3 L nasal cannula during waking hours  Keep oxygen saturations above 88%  Increase activity as tolerated  When the ambulation in pairs can consider pulmonary rehab  During sleeping hours, recommend BiPAP  16/8  He is up-to-date on COVID vaccinations

## 2021-09-13 NOTE — ASSESSMENT & PLAN NOTE
Wt Readings from Last 3 Encounters:   08/19/21 (!) 162 kg (357 lb 12 9 oz)   07/14/21 (!) 155 kg (342 lb 3 2 oz)   03/04/21 (!) 153 kg (338 lb 3 oz)       Continue medical management including Bumex per primary team   Monitor I/ O and daily weights at nursing home

## 2021-09-13 NOTE — PROGRESS NOTES
Pulmonary Follow Up Note   Ady Rodriguez 57 y o  male MRN: [id number]  9/13/2021      Assessment:    Chronic respiratory failure with hypoxia and hypercapnia (HCC)    Continue 2-3 L nasal cannula during waking hours  Keep oxygen saturations above 88%  Increase activity as tolerated  When the ambulation in pairs can consider pulmonary rehab  During sleeping hours, recommend BiPAP  16/8  He is up-to-date on COVID vaccinations  Obesity hypoventilation syndrome (HCC)   Continue BiPAP 16/8 during all hours of sleep, naps included  Patient reports improvement in his symptoms since initiating PAP therapy and will continue this as it is medically necessary to treat his obesity hypoventilation syndrome  Morbid obesity     Contributory to patient's dyspnea  Patient needs massive weight loss renal if the modifications including decreased caloric intake, healthier diet options, and increased activity as tolerated were all recommended  Paroxysmal atrial fibrillation [facility])    Medical management per primary team   Continue anticoagulation with Eliquis  Multifocal pneumonia    Resolved  Chronic diastolic heart failure (HCC)  Wt Readings from Last 3 Encounters:   08/19/21 (!) 162 kg (357 lb 12 9 oz)   07/14/21 (!) 155 kg (342 lb 3 2 oz)   03/04/21 (!) 153 kg (338 lb 3 oz)       Continue medical management including Bumex per primary team   Monitor I/ O and daily weights at nursing home  Plan:    Diagnoses and all orders for this visit:    Chronic respiratory failure with hypoxia and hypercapnia (HCC)    Obesity hypoventilation syndrome (HCC)    Morbid obesity     Paroxysmal atrial fibrillation (HCC)    Multifocal pneumonia        Return in about 6 months (around 3/13/2022)  History of Present Illness   HPI:  Ady Rodriguez is a 57 y o  male who  Presents to the  Office today for hospital follow-up    Patient was hospitalized in August for acute on chronic hypoxic and hypercapnic respiratory failure in the setting of OHS, sepsis secondary to aspiration pneumonia  Initially patient presented meeting sepsis criteria there is concern for aspiration pneumonia  He was treated adequately with 5 days of cefepime and vancomycin before infectious Disease recommended discontinuing antibiotics  Due to concern for possible drug rash  Patient then was restarted on his home midodrine and hypotension improved significantly  He was diuresed per primary/ Nephrology team   From pulmonary standpoint we manage his BiPAP which she uses during all hours of sleep at 16/8 and 2-3 L nasal cannula during waking hours  Presently, patient states that he feels like he is at his baseline  No worsening respiratory symptoms but does report chronic dyspnea on exertion, chronic cough and off and wheeze  Albuterol HFA helps  His BiPAP and his oxygen help as well  Review of Systems   All other systems reviewed and are negative  Historical Information   Past Medical History:   Diagnosis Date    Allergic rhinitis     last assessed 9/12/12    Finger fracture, right     Closed fx of the middle phalanx of the right 5th finger  last assessed 1/30/14    GI bleed 2/22/2019    Hemorrhoids     last assessed 2/10/14    Hyperlipidemia     Hypertension     Stroke [facility])      Past Surgical History:   Procedure Laterality Date    AORTIC VALVE REPLACEMENT  07/30/2014    AVR with 25mm [facility] Ease bovine pericardial valve    CARDIAC CATHETERIZATION  07/18/2014    SLB left main-normal, circumflex-normal, ramus intermedius-normal, RCA was large and dominant giving rise to the PDA & a large posterolateral branch  No disease  last assessed 8/19/14     COLONOSCOPY N/A 2/25/2019    Procedure: COLONOSCOPY;  Surgeon: Richard Zhang DO;  [facility];   Service: Gastroenterology    ESOPHAGOGASTRODUODENOSCOPY N/A 2/22/2019    Procedure: ESOPHAGOGASTRODUODENOSCOPY (EGD)-roadshow overnight;  Surgeon: Richard Zhang DO; [facility]; Service: Gastroenterology    ESOPHAGOGASTRODUODENOSCOPY N/A 2/23/2019    Procedure: ESOPHAGOGASTRODUODENOSCOPY (EGD); Surgeon: Sarah Gallo MD;  [facility]; Service: Gastroenterology    ESOPHAGOGASTRODUODENOSCOPY N/A 2/25/2019    Procedure: ESOPHAGOGASTRODUODENOSCOPY (EGD); Surgeon: Richard Zhang DO;  [facility];   Service: Gastroenterology    IR MIDLINE PLACEMENT  8/17/2021    IR NON-TUNNELED CENTRAL LINE PLACEMENT  3/1/2019    IR NON-TUNNELED CENTRAL LINE PLACEMENT  2/4/2019    IR TUNNELED DIALYSIS CATHETER CHECK/CHANGE/REPOSITION/ANGIOPLASTY  4/18/2019    IR TUNNELED DIALYSIS CATHETER PLACEMENT  2/4/2019    IR TUNNELED DIALYSIS CATHETER PLACEMENT  2/18/2019    KNEE ARTHROSCOPY      KNEE CARTILAGE SURGERY Left     medial meniscus tear     TESTICLE SURGERY      TONSILLECTOMY AND ADENOIDECTOMY      TOOTH EXTRACTION       Family History   Problem Relation Age of Onset    Lung cancer Mother     Heart disease Mother         pacemaker placement     Coronary artery disease Father     Hypertension Father     Heart attack Father     Cancer Family         bladder        Social History     Tobacco Use   Smoking Status Never Smoker   Smokeless Tobacco Never Used         Meds/Allergies     Current Outpatient Medications:     acetaminophen (TYLENOL) 325 mg tablet, Take 2 tablets (650 mg total) by mouth every 6 (six) hours as needed for mild pain (Patient taking differently: Take 650 mg by mouth every 12 (twelve) hours Patient takes tylenol every 12 hours at the nursing home), Disp: 30 tablet, Rfl: 0    albuterol (ProAir HFA) 90 mcg/act inhaler, Inhale 2 puffs every 6 (six) hours as needed for wheezing or shortness of breath, Disp: 8 5 g, Rfl: 6    allopurinol (ZYLOPRIM) 100 mg tablet, Take 100 mg by mouth daily, Disp: , Rfl:     apixaban (Eliquis) 2 5 mg, Take 2 5 mg by mouth 2 (two) times a day, Disp: , Rfl:     aspirin (ECOTRIN LOW STRENGTH) 81 mg EC tablet, Take 1 tablet (81 mg total) by mouth daily, Disp: , Rfl: 0    atorvastatin (LIPITOR) 40 mg tablet, Take 1 tablet (40 mg total) by mouth daily with dinner, Disp: , Rfl: 0    bisacodyl (DULCOLAX) 10 mg suppository, Insert 1 suppository (10 mg total) into the rectum daily as needed for constipation, Disp: 12 suppository, Rfl: 0    bumetanide (BUMEX) 1 mg tablet, Take 2 mg by mouth 2 (two) times a day , Disp: , Rfl:     cholecalciferol (VITAMIN D3) 1,000 units tablet, Take 2,000 Units by mouth daily, Disp: , Rfl:     diphenhydrAMINE (BENADRYL) 25 mg tablet, Take 1 tablet (25 mg total) by mouth every 6 (six) hours as needed for itching, Disp: 30 tablet, Rfl: 0    magnesium oxide (MAG-OX) 400 mg, Take 1 tablet (400 mg total) by mouth daily, Disp: , Rfl: 0    Melatonin 3 MG CAPS, Take 3 mg by mouth, Disp: , Rfl:     metoprolol tartrate (LOPRESSOR) 50 mg tablet, Take 1 tablet (50 mg total) by mouth 3 (three) times a day, Disp: , Rfl: 0    OXYGEN-HELIUM IN, 2 L into each nostril 2L NC continuous for sob, keep O2>90%, Disp: , Rfl:     pantoprazole (PROTONIX) 40 mg tablet, Take 1 tablet (40 mg total) by mouth 2 (two) times a day before meals (Patient taking differently: Take 40 mg by mouth 2 (two) times a day ), Disp: , Rfl: 0    POTASSIUM CHLORIDE PO, Take 40 mEq by mouth 2 (two) times a day, Disp: , Rfl:     sertraline (ZOLOFT) 100 mg tablet, Take 100 mg by mouth daily, Disp: , Rfl:     sertraline (ZOLOFT) 25 mg tablet, Take 1 tablet (25 mg total) by mouth daily after dinner, Disp: , Rfl: 0    midodrine (PROAMATINE) 10 MG tablet, Take 1 tablet (10 mg total) by mouth every 8 (eight) hours (Patient not taking: Reported on 7/14/2021), Disp: 90 tablet, Rfl: 0  Allergies   Allergen Reactions    Amiodarone      Developed Rash    Flagyl [Metronidazole] Itching     Patient had 2 episodes of itching and redness with flagyl     Penicillins Rash     However, has subsequently tolerated Cefazolin and Cefepime Vitals: Blood pressure 119/89, pulse (!) 133, temperature 97 7 °F (36 5 °C), temperature source Tympanic, SpO2 98 %  There is no height or weight on file to calculate BMI  Oxygen Therapy  SpO2: 98 %  Oxygen Therapy: Supplemental oxygen  O2 Delivery Method: Nasal cannula  O2 Flow Rate (L/min): 3 L/min    Physical Exam  Physical Exam  Constitutional:       Appearance: Normal appearance  He is well-developed  HENT:      Head: Normocephalic and atraumatic  Right Ear: External ear normal       Left Ear: External ear normal    Eyes:      Extraocular Movements: Extraocular movements intact  Pupils: Pupils are equal, round, and reactive to light  Cardiovascular:      Rate and Rhythm: Normal rate and regular rhythm  Pulses: Normal pulses  Heart sounds: Normal heart sounds  No murmur heard  Pulmonary:      Effort: Pulmonary effort is normal  No respiratory distress  Breath sounds: Normal breath sounds  No stridor  No wheezing, rhonchi or rales  Abdominal:      Palpations: Abdomen is soft  Tenderness: There is no abdominal tenderness  Hernia: No hernia is present  Musculoskeletal:         General: No swelling, tenderness or deformity  Cervical back: Normal range of motion and neck supple  Right lower leg: Edema (trace) present  Left lower leg: Edema (trace) present  Skin:     General: Skin is warm and dry  Capillary Refill: Capillary refill takes less than 2 seconds  Neurological:      General: No focal deficit present  Mental Status: He is alert and oriented to person, place, and time  Mental status is at baseline  Psychiatric:         Behavior: Behavior normal          Thought Content: Thought content normal          Judgment: Judgment normal          Labs: I have personally reviewed pertinent lab results  , ABG: No results found for: PHART, FTF8IRM, PO2ART, QYV9KTB, J3OGDNAX, BEART, SOURCE, BNP: No results found for: BNP, CBC: No results found for: WBC, HGB, HCT, MCV, PLT, ADJUSTEDWBC, MCH, MCHC, RDW, MPV, NRBC, CMP: No results found for: SODIUM, K, CL, CO2, ANIONGAP, BUN, CREATININE, GLUCOSE, CALCIUM, AST, ALT, ALKPHOS, PROT, BILITOT, EGFR, PT/INR: No results found for: PT, INR, Troponin: No results found for: TROPONINI  Lab Results   Component Value Date    WBC 10 60 (H) 08/19/2021    HGB 8 7 (L) 08/19/2021    HCT 33 0 (L) 08/19/2021    MCV 84 08/19/2021     08/19/2021     Lab Results   Component Value Date    GLUCOSE 101 08/11/2021    CALCIUM 8 6 08/19/2021     04/09/2015    K 3 1 (L) 08/19/2021    CO2 35 (H) 08/19/2021     08/19/2021    BUN 48 (H) 08/19/2021    CREATININE 1 47 (H) 08/19/2021     No results found for: IGE  Lab Results   Component Value Date    ALT 14 08/16/2021    AST 8 08/16/2021    ALKPHOS 64 08/16/2021    BILITOT 0 4 10/30/2014       Imaging and other studies: I have personally reviewed pertinent reports  and I have personally reviewed pertinent films in PACS       CT chest abdomen pelvis 08/15/2021   Cardiomegaly with mild interstitial pulmonary edema and small bilateral pleural effusions with associated compressive atelectasis  Distended sigmoid colon with an air-fluid level  Mild anasarca with scattered areas of mild inflammatory change in the subcutaneous soft tissue of the right lateral abdominal wall left proximal thigh      Pulmonary function testing: none to review

## 2021-09-13 NOTE — ASSESSMENT & PLAN NOTE
Continue BiPAP 16/8 during all hours of sleep, naps included  Patient reports improvement in his symptoms since initiating PAP therapy and will continue this as it is medically necessary to treat his obesity hypoventilation syndrome

## 2021-09-23 NOTE — PROGRESS NOTES
Nephrology   Office Follow-Up  Chiquita Vazquez 57 y o  male MRN: [id number]    Encounter: [id number]        Assessment & Plan    Chiquita Vazquez was seen in the [place] office today  All diagnoses and orders for visit:     1  Acute kidney injury superimposed on chronic kidney disease ([address] )  · Resolved  Patient has had multiple AKIs in the past     -     Ambulatory referral to Nephrology   -      kidney and bladder with pvr; Future; Expected date: 09/24/2021   -     Comprehensive metabolic panel; Future; Expected date: 11/04/2021   -     CBC and differential; Future; Expected date: 11/04/2021  2  Stage 3a chronic kidney disease  · Record review indicates baseline creatinine around 1 1-1 3 mg/dL  Most recent sCr 1 14 mg/dL  eGFR may be over-estimated as I presume he has decreased muscle mass, not very mobile  Continue to avoid potential nephrotoxins  Obtain renal ultrasound  RTO 4 months  3  Persistent proteinuria  · Rule out monoclonal gammopathy  Can consider very low dose lisinopril given hypotension once electrophoresis is deemed negative    -     Protein electrophoresis, serum; Future; Expected date: 11/04/2021   -     Protein electrophoresis, urine; Future; Expected date: 11/04/2021  4  Iron deficiency   -     Iron Panel (Includes Ferritin, Iron Sat%, Iron, and TIBC); Future; Expected date: 11/04/2021  5  Lower extremity edema  · Continue diuretics as ordered  Recommend low sodium diet with gentle fluid restriction, daily weights   6  Chronic diastolic heart failure (HCC)  · Echocardiogram earlier this year revealed LVEF 55%  Diuretics as above   7  Paroxysmal atrial fibrillation (HCC)  · On eliquis  8  Essential hypertension now with idiopathic hypotension  · On midodrine 10 mg TID  Recommend weaning as able  Consider decreasing evening dose  9  Chronic respiratory failure with hypoxia (HCC)  · On regular oxygen prescription   Follows with Pulmonology       HPI: Chiquita Vazquez is a 57 y o  male who is here for hospital follow-up regarding recent PATRICK, hypotension CKD stage 3  Other medical issues include history of CVA, seizure disorder, chronic oxygen dependence, atrial fibrillation, aortic valve replacement, hyperlipidemia, history of hypertension, depression  Mr Ilene Acosta was hospitalized at [address] [place] [address] from 1/17-3/4/21 for acute hypoxic respiratory failure secondary to COVID19 pneumonia requiring mechanical ventilation and superimposed ESBL and E  Coli pneumonia  Other issues included: PATRICK on CKD 3, hypernatremia, idiopathic hypotension  He was then hospitalized at [address] [place] [address] from 8/11-8/19/21 for septic shock secondary to aspiration pneumonia, drug rash  Renal followed along for PATRICK on CKD, peak sCr 1 6 mg/dL  Most recent sCr within baseline  He presents today from SNF for follow-up  Stable from a renal perspective  He will have a renal ultrasound done to evaluate anatomy  Blood work will be repeated in 6 weeks and return to office in 4 months with primary nephrologist      ROS:   Review of Systems   Constitutional: Negative for chills and fever  HENT: Negative for ear pain and sore throat  Eyes: Negative for pain and visual disturbance  Respiratory: Negative for cough and shortness of breath  Cardiovascular: Negative for chest pain and palpitations  Gastrointestinal: Negative for abdominal pain and vomiting  Genitourinary: Negative for dysuria and hematuria  Musculoskeletal: Negative for arthralgias and back pain  Skin: Negative for color change and rash  Neurological: Negative for seizures and syncope  All other systems reviewed and are negative        Allergies: Amiodarone, Flagyl [metronidazole], and Penicillins    Medications:   Current Outpatient Medications:     acetaminophen (TYLENOL) 325 mg tablet, Take 2 tablets (650 mg total) by mouth every 6 (six) hours as needed for mild pain (Patient taking differently: Take 650 mg by mouth every 12 (twelve) hours Patient takes tylenol every 12 hours at the nursing home), Disp: 30 tablet, Rfl: 0    albuterol (ProAir HFA) 90 mcg/act inhaler, Inhale 2 puffs every 6 (six) hours as needed for wheezing or shortness of breath, Disp: 8 5 g, Rfl: 6    allopurinol (ZYLOPRIM) 100 mg tablet, Take 100 mg by mouth daily, Disp: , Rfl:     apixaban (Eliquis) 2 5 mg, Take 2 5 mg by mouth 2 (two) times a day, Disp: , Rfl:     aspirin (ECOTRIN LOW STRENGTH) 81 mg EC tablet, Take 1 tablet (81 mg total) by mouth daily, Disp: , Rfl: 0    atorvastatin (LIPITOR) 40 mg tablet, Take 1 tablet (40 mg total) by mouth daily with dinner, Disp: , Rfl: 0    bisacodyl (DULCOLAX) 10 mg suppository, Insert 1 suppository (10 mg total) into the rectum daily as needed for constipation, Disp: 12 suppository, Rfl: 0    bumetanide (BUMEX) 1 mg tablet, Take 2 mg by mouth 2 (two) times a day , Disp: , Rfl:     bumetanide (BUMEX) 2 mg tablet, Take 2 mg by mouth daily, Disp: , Rfl:     cholecalciferol (VITAMIN D3) 1,000 units tablet, Take 2,000 Units by mouth daily, Disp: , Rfl:     diphenhydrAMINE (BENADRYL) 25 mg tablet, Take 1 tablet (25 mg total) by mouth every 6 (six) hours as needed for itching, Disp: 30 tablet, Rfl: 0    hydrocortisone (CORTEF) 10 mg tablet, Take 10 mg by mouth 3 (three) times a day, Disp: , Rfl:     magnesium hydroxide (MILK OF MAGNESIA) 400 mg/5 mL oral suspension, Take 30 mL by mouth daily at bedtime as needed for constipation, Disp: , Rfl:     magnesium oxide (MAG-OX) 400 mg, Take 1 tablet (400 mg total) by mouth daily, Disp: , Rfl: 0    Melatonin 3 MG CAPS, Take 3 mg by mouth, Disp: , Rfl:     metoprolol tartrate (LOPRESSOR) 50 mg tablet, Take 1 tablet (50 mg total) by mouth 3 (three) times a day, Disp: , Rfl: 0    midodrine (PROAMATINE) 10 MG tablet, Take 1 tablet (10 mg total) by mouth every 8 (eight) hours (Patient taking differently: Take 10 mg by mouth 3 (three) times a day ), Disp: 90 tablet, Rfl: 0    OXYGEN-HELIUM IN, 2 L into each nostril 2L NC continuous for sob, keep O2>90%, Disp: , Rfl:     pantoprazole (PROTONIX) 40 mg tablet, Take 1 tablet (40 mg total) by mouth 2 (two) times a day before meals (Patient taking differently: Take 40 mg by mouth 2 (two) times a day ), Disp: , Rfl: 0    POTASSIUM CHLORIDE PO, Take 40 mEq by mouth 2 (two) times a day, Disp: , Rfl:     sertraline (ZOLOFT) 100 mg tablet, Take 100 mg by mouth daily, Disp: , Rfl:     sertraline (ZOLOFT) 25 mg tablet, Take 1 tablet (25 mg total) by mouth daily after dinner, Disp: , Rfl: 0    Past Medical History:   Diagnosis Date    Allergic rhinitis     last assessed 9/12/12    Finger fracture, right     Closed fx of the middle phalanx of the right 5th finger  last assessed 1/30/14    GI bleed 2/22/2019    Hemorrhoids     last assessed 2/10/14    Hyperlipidemia     Hypertension     Stroke [facility])      Past Surgical History:   Procedure Laterality Date    AORTIC VALVE REPLACEMENT  07/30/2014    AVR with 25mm [facility] Ease bovine pericardial valve    CARDIAC CATHETERIZATION  07/18/2014    SLB left main-normal, circumflex-normal, ramus intermedius-normal, RCA was large and dominant giving rise to the PDA & a large posterolateral branch  No disease  last assessed 8/19/14     COLONOSCOPY N/A 2/25/2019    Procedure: COLONOSCOPY;  Surgeon: Madhu Rose DO;  [facility]; Service: Gastroenterology    ESOPHAGOGASTRODUODENOSCOPY N/A 2/22/2019    Procedure: ESOPHAGOGASTRODUODENOSCOPY (EGD)-roadshow overnight;  Surgeon: Madhu Rose DO;  [facility]; Service: Gastroenterology    ESOPHAGOGASTRODUODENOSCOPY N/A 2/23/2019    Procedure: ESOPHAGOGASTRODUODENOSCOPY (EGD); Surgeon: Rowan Garsia MD;  [facility]; Service: Gastroenterology    ESOPHAGOGASTRODUODENOSCOPY N/A 2/25/2019    Procedure: ESOPHAGOGASTRODUODENOSCOPY (EGD); Surgeon: Madhu Rose DO;  [facility];   Service: Gastroenterology    IR MIDLINE PLACEMENT  8/17/2021    IR NON-TUNNELED CENTRAL LINE PLACEMENT  3/1/2019    IR NON-TUNNELED CENTRAL LINE PLACEMENT  2/4/2019    IR TUNNELED DIALYSIS CATHETER CHECK/CHANGE/REPOSITION/ANGIOPLASTY  4/18/2019    IR TUNNELED DIALYSIS CATHETER PLACEMENT  2/4/2019    IR TUNNELED DIALYSIS CATHETER PLACEMENT  2/18/2019    KNEE ARTHROSCOPY      KNEE CARTILAGE SURGERY Left     medial meniscus tear     TESTICLE SURGERY      TONSILLECTOMY AND ADENOIDECTOMY      TOOTH EXTRACTION       Family History   Problem Relation Age of Onset    Lung cancer Mother     Heart disease Mother         pacemaker placement     Coronary artery disease Father     Hypertension Father     Heart attack Father     Cancer Family         bladder       reports that he has never smoked  He has never used smokeless tobacco  He reports that he does not drink alcohol and does not use drugs  Physical Exam:   Vitals:    09/24/21 0932   BP: 130/74   Pulse: 67   SpO2: 97%   Height: 5' 11" (1 803 m)     Body mass index is 49 9 kg/m²  General: conscious, cooperative, in no acute distress, appears stated age on nasal cannula  Eyes: conjunctivae pale, anicteric sclerae  ENT: lips and mucous membranes moist  Neck: supple, no JVD, no masses  Chest:  essentially clear breath sounds bilaterally, no crackles, ronchus or wheezings  CVS: S1 & S2, normal rate, regular rhythm  Abdomen: soft, non-tender, non-distended, normoactive bowel sounds, rounded, obese  Extremities: moderate edema of both legs  Skin: no rash   Neuro: awake, alert, oriented       Diagnostic Data:  Lab: I have personally reviewed pertinent lab results  ,   CBC:       CMP: No results found for: SODIUM, K, CL, CO2, ANIONGAP, BUN, CREATININE, GLUCOSE, CALCIUM, AST, ALT, ALKPHOS, PROT, BILITOT, EGFR,   PT/INR: No results found for: PT, INR,   Magnesium: No components found for: MAG,  Phosphorous: No results found for: PHOS    Patient Instructions   Blood work in 6 weeks  Get kidney ultrasound done  Recommend low sodium diet, daily weights  Return to office in 4 months      Portions of the record may have been created with voice recognition software  Occasional wrong word or "sound a like" substitutions may have occurred due to the inherent limitations of voice recognition software  Read the chart carefully and recognize, using context, where substitutions have occurred  If you have any questions, please contact the dictating provider

## 2021-09-24 ENCOUNTER — TELEPHONE (OUTPATIENT)
Dept: NEPHROLOGY | Facility: CLINIC | Age: 61
End: 2021-09-24

## 2021-09-24 ENCOUNTER — OFFICE VISIT (OUTPATIENT)
Dept: NEPHROLOGY | Facility: CLINIC | Age: 61
End: 2021-09-24
Payer: MEDICARE

## 2021-09-24 VITALS
BODY MASS INDEX: 49.9 KG/M2 | HEIGHT: 71 IN | DIASTOLIC BLOOD PRESSURE: 74 MMHG | SYSTOLIC BLOOD PRESSURE: 130 MMHG | OXYGEN SATURATION: 97 % | HEART RATE: 67 BPM

## 2021-09-24 DIAGNOSIS — N18.9 ACUTE KIDNEY INJURY SUPERIMPOSED ON CHRONIC KIDNEY DISEASE (HCC): Primary | ICD-10-CM

## 2021-09-24 DIAGNOSIS — E61.1 IRON DEFICIENCY: ICD-10-CM

## 2021-09-24 DIAGNOSIS — R60.0 LOWER EXTREMITY EDEMA: ICD-10-CM

## 2021-09-24 DIAGNOSIS — R80.1 PERSISTENT PROTEINURIA: ICD-10-CM

## 2021-09-24 DIAGNOSIS — N17.9 ACUTE KIDNEY INJURY SUPERIMPOSED ON CHRONIC KIDNEY DISEASE (HCC): Primary | ICD-10-CM

## 2021-09-24 DIAGNOSIS — I48.0 PAROXYSMAL ATRIAL FIBRILLATION (HCC): ICD-10-CM

## 2021-09-24 DIAGNOSIS — N18.31 STAGE 3A CHRONIC KIDNEY DISEASE (HCC): ICD-10-CM

## 2021-09-24 DIAGNOSIS — J96.11 CHRONIC RESPIRATORY FAILURE WITH HYPOXIA (HCC): ICD-10-CM

## 2021-09-24 DIAGNOSIS — I10 ESSENTIAL HYPERTENSION: Chronic | ICD-10-CM

## 2021-09-24 DIAGNOSIS — I50.32 CHRONIC DIASTOLIC HEART FAILURE (HCC): ICD-10-CM

## 2021-09-24 PROBLEM — A41.9 SEPTIC SHOCK (HCC): Status: RESOLVED | Noted: 2019-01-24 | Resolved: 2021-09-24

## 2021-09-24 PROBLEM — R65.21 SEPTIC SHOCK (HCC): Status: RESOLVED | Noted: 2019-01-24 | Resolved: 2021-09-24

## 2021-09-24 PROBLEM — Z99.2 VASCULAR DIALYSIS CATHETER IN PLACE (HCC): Status: ACTIVE | Noted: 2021-09-24

## 2021-09-24 PROBLEM — E87.0 HYPERNATREMIA: Status: RESOLVED | Noted: 2021-01-27 | Resolved: 2021-09-24

## 2021-09-24 PROBLEM — Z99.2 VASCULAR DIALYSIS CATHETER IN PLACE (HCC): Status: RESOLVED | Noted: 2021-09-24 | Resolved: 2021-09-24

## 2021-09-24 PROCEDURE — 99214 OFFICE O/P EST MOD 30 MIN: CPT | Performed by: NURSE PRACTITIONER

## 2021-09-24 RX ORDER — BUMETANIDE 2 MG/1
2 TABLET ORAL DAILY
COMMUNITY
Start: 2021-08-12

## 2021-09-24 RX ORDER — HYDROCORTISONE 10 MG/1
10 TABLET ORAL 3 TIMES DAILY
COMMUNITY
End: 2021-11-09 | Stop reason: HOSPADM

## 2021-09-24 NOTE — PATIENT INSTRUCTIONS
Blood work in 6 weeks  Get kidney ultrasound done  Recommend low sodium diet, daily weights  Return to office in 4 months

## 2021-09-24 NOTE — TELEPHONE ENCOUNTER
Sonia Chavez from [facility] called  She asked what SPEP and UPEP meant on the summary

## 2021-09-24 NOTE — TELEPHONE ENCOUNTER
----- Message from Catalina De Anda sent at 9/24/2021  1:17 PM EDT -----    ----- Message -----  From: KATHY Ba  Sent: 9/24/2021  11:56 AM EDT  To: Nephrology [facility]    Please send my note from today to SNF

## 2021-09-24 NOTE — TELEPHONE ENCOUNTER
Spoke with Florinda Alan  She is aware  She stats they did not receive at note   Their fax number is [phone]

## 2021-09-24 NOTE — TELEPHONE ENCOUNTER
Serum protein electrophoresis  Urinary protein electrophoresis  To rule out monoclonal gammopathy  I think I had my note faxed to them, please verify this

## 2021-10-25 ENCOUNTER — HOSPITAL ENCOUNTER (EMERGENCY)
Facility: HOSPITAL | Age: 61
End: 2021-10-25
Attending: EMERGENCY MEDICINE | Admitting: EMERGENCY MEDICINE
Payer: MEDICARE

## 2021-10-25 ENCOUNTER — APPOINTMENT (EMERGENCY)
Dept: RADIOLOGY | Facility: HOSPITAL | Age: 61
End: 2021-10-25
Payer: MEDICARE

## 2021-10-25 ENCOUNTER — APPOINTMENT (EMERGENCY)
Dept: CT IMAGING | Facility: HOSPITAL | Age: 61
End: 2021-10-25
Payer: MEDICARE

## 2021-10-25 VITALS
TEMPERATURE: 97.3 F | HEART RATE: 100 BPM | WEIGHT: 315 LBS | BODY MASS INDEX: 44.1 KG/M2 | HEIGHT: 71 IN | OXYGEN SATURATION: 98 % | RESPIRATION RATE: 18 BRPM | DIASTOLIC BLOOD PRESSURE: 66 MMHG | SYSTOLIC BLOOD PRESSURE: 105 MMHG

## 2021-10-25 DIAGNOSIS — J96.01 ACUTE HYPOXEMIC RESPIRATORY FAILURE (HCC): Primary | ICD-10-CM

## 2021-10-25 DIAGNOSIS — I47.1 SUPRAVENTRICULAR TACHYCARDIA (HCC): ICD-10-CM

## 2021-10-25 DIAGNOSIS — J18.9 PNEUMONIA OF RIGHT LUNG DUE TO INFECTIOUS ORGANISM: ICD-10-CM

## 2021-10-25 DIAGNOSIS — R79.89 ELEVATED BRAIN NATRIURETIC PEPTIDE (BNP) LEVEL: ICD-10-CM

## 2021-10-25 DIAGNOSIS — R56.9 NEW ONSET SEIZURE (HCC): ICD-10-CM

## 2021-10-25 LAB
ALBUMIN SERPL BCP-MCNC: 3.5 G/DL (ref 3.5–5)
ALP SERPL-CCNC: 92 U/L (ref 46–116)
ALT SERPL W P-5'-P-CCNC: 23 U/L (ref 12–78)
ANION GAP SERPL CALCULATED.3IONS-SCNC: 2 MMOL/L (ref 4–13)
APTT PPP: 29 SECONDS (ref 23–37)
ARTERIAL PATENCY WRIST A: YES
ARTERIAL PATENCY WRIST A: YES
AST SERPL W P-5'-P-CCNC: 14 U/L (ref 5–45)
BACTERIA UR QL AUTO: ABNORMAL /HPF
BASE EXCESS BLDA CALC-SCNC: 3.8 MMOL/L
BASE EXCESS BLDA CALC-SCNC: 4.6 MMOL/L
BASOPHILS # BLD AUTO: 0.06 THOUSANDS/ΜL (ref 0–0.1)
BASOPHILS NFR BLD AUTO: 1 % (ref 0–1)
BILIRUB SERPL-MCNC: 0.4 MG/DL (ref 0.2–1)
BILIRUB UR QL STRIP: NEGATIVE
BUN SERPL-MCNC: 14 MG/DL (ref 5–25)
CALCIUM SERPL-MCNC: 9 MG/DL (ref 8.3–10.1)
CHLORIDE SERPL-SCNC: 103 MMOL/L (ref 100–108)
CLARITY UR: ABNORMAL
CO2 SERPL-SCNC: 40 MMOL/L (ref 21–32)
COLOR UR: YELLOW
CREAT SERPL-MCNC: 1.15 MG/DL (ref 0.6–1.3)
EOSINOPHIL # BLD AUTO: 0.41 THOUSAND/ΜL (ref 0–0.61)
EOSINOPHIL NFR BLD AUTO: 5 % (ref 0–6)
ERYTHROCYTE [DISTWIDTH] IN BLOOD BY AUTOMATED COUNT: 19.1 % (ref 11.6–15.1)
FLUAV RNA RESP QL NAA+PROBE: NEGATIVE
FLUBV RNA RESP QL NAA+PROBE: NEGATIVE
GFR SERPL CREATININE-BSD FRML MDRD: 68 ML/MIN/1.73SQ M
GLUCOSE SERPL-MCNC: 96 MG/DL (ref 65–140)
GLUCOSE UR STRIP-MCNC: NEGATIVE MG/DL
HCO3 BLDA-SCNC: 27.5 MMOL/L (ref 22–28)
HCO3 BLDA-SCNC: 32.7 MMOL/L (ref 22–28)
HCT VFR BLD AUTO: 40.2 % (ref 36.5–49.3)
HGB BLD-MCNC: 10.3 G/DL (ref 12–17)
HGB UR QL STRIP.AUTO: ABNORMAL
HOROWITZ INDEX BLDA+IHG-RTO: 50 MM[HG]
IMM GRANULOCYTES # BLD AUTO: 0.03 THOUSAND/UL (ref 0–0.2)
IMM GRANULOCYTES NFR BLD AUTO: 0 % (ref 0–2)
INR PPP: 1.08 (ref 0.84–1.19)
KETONES UR STRIP-MCNC: NEGATIVE MG/DL
LACTATE SERPL-SCNC: 0.7 MMOL/L (ref 0.5–2)
LEUKOCYTE ESTERASE UR QL STRIP: ABNORMAL
LYMPHOCYTES # BLD AUTO: 0.7 THOUSANDS/ΜL (ref 0.6–4.47)
LYMPHOCYTES NFR BLD AUTO: 9 % (ref 14–44)
MAGNESIUM SERPL-MCNC: 2 MG/DL (ref 1.6–2.6)
MCH RBC QN AUTO: 21.6 PG (ref 26.8–34.3)
MCHC RBC AUTO-ENTMCNC: 25.6 G/DL (ref 31.4–37.4)
MCV RBC AUTO: 85 FL (ref 82–98)
MONOCYTES # BLD AUTO: 0.64 THOUSAND/ΜL (ref 0.17–1.22)
MONOCYTES NFR BLD AUTO: 8 % (ref 4–12)
MUCOUS THREADS UR QL AUTO: ABNORMAL
NASAL CANNULA: 6
NEUTROPHILS # BLD AUTO: 5.88 THOUSANDS/ΜL (ref 1.85–7.62)
NEUTS SEG NFR BLD AUTO: 77 % (ref 43–75)
NITRITE UR QL STRIP: POSITIVE
NON-SQ EPI CELLS URNS QL MICRO: ABNORMAL /HPF
NRBC BLD AUTO-RTO: 0 /100 WBCS
NT-PROBNP SERPL-MCNC: ABNORMAL PG/ML
O2 CT BLDA-SCNC: 13.8 ML/DL (ref 16–23)
O2 CT BLDA-SCNC: 15.7 ML/DL (ref 16–23)
OTHER STN SPEC: ABNORMAL
OXYHGB MFR BLDA: 91.1 % (ref 94–97)
OXYHGB MFR BLDA: 97.2 % (ref 94–97)
PCO2 BLDA: 38.2 MM HG (ref 36–44)
PCO2 BLDA: 69.3 MM HG (ref 36–44)
PEEP RESPIRATORY: 5 CM[H2O]
PH BLDA: 7.29 [PH] (ref 7.35–7.45)
PH BLDA: 7.47 [PH] (ref 7.35–7.45)
PH UR STRIP.AUTO: 6 [PH]
PLATELET # BLD AUTO: 204 THOUSANDS/UL (ref 149–390)
PO2 BLDA: 100.4 MM HG (ref 75–129)
PO2 BLDA: 66.3 MM HG (ref 75–129)
POTASSIUM SERPL-SCNC: 5 MMOL/L (ref 3.5–5.3)
PROCALCITONIN SERPL-MCNC: <0.05 NG/ML
PROT SERPL-MCNC: 7.6 G/DL (ref 6.4–8.2)
PROT UR STRIP-MCNC: ABNORMAL MG/DL
PROTHROMBIN TIME: 13.5 SECONDS (ref 11.6–14.5)
RBC # BLD AUTO: 4.76 MILLION/UL (ref 3.88–5.62)
RBC #/AREA URNS AUTO: ABNORMAL /HPF
RSV RNA RESP QL NAA+PROBE: NEGATIVE
SARS-COV-2 RNA RESP QL NAA+PROBE: NEGATIVE
SODIUM SERPL-SCNC: 145 MMOL/L (ref 136–145)
SP GR UR STRIP.AUTO: 1.02 (ref 1–1.03)
SPECIMEN SOURCE: ABNORMAL
SPECIMEN SOURCE: ABNORMAL
TROPONIN I SERPL-MCNC: <0.02 NG/ML
UROBILINOGEN UR QL STRIP.AUTO: 0.2 E.U./DL
VENT AC: 22
VENT- AC: AC
VT SETTING VENT: 500 ML
WBC # BLD AUTO: 7.72 THOUSAND/UL (ref 4.31–10.16)
WBC #/AREA URNS AUTO: ABNORMAL /HPF

## 2021-10-25 PROCEDURE — 96375 TX/PRO/DX INJ NEW DRUG ADDON: CPT

## 2021-10-25 PROCEDURE — 87186 SC STD MICRODIL/AGAR DIL: CPT | Performed by: EMERGENCY MEDICINE

## 2021-10-25 PROCEDURE — 94760 N-INVAS EAR/PLS OXIMETRY 1: CPT

## 2021-10-25 PROCEDURE — 85025 COMPLETE CBC W/AUTO DIFF WBC: CPT | Performed by: EMERGENCY MEDICINE

## 2021-10-25 PROCEDURE — 99291 CRITICAL CARE FIRST HOUR: CPT | Performed by: EMERGENCY MEDICINE

## 2021-10-25 PROCEDURE — 36600 WITHDRAWAL OF ARTERIAL BLOOD: CPT

## 2021-10-25 PROCEDURE — 84145 PROCALCITONIN (PCT): CPT | Performed by: EMERGENCY MEDICINE

## 2021-10-25 PROCEDURE — 96376 TX/PRO/DX INJ SAME DRUG ADON: CPT

## 2021-10-25 PROCEDURE — 96367 TX/PROPH/DG ADDL SEQ IV INF: CPT

## 2021-10-25 PROCEDURE — 92960 CARDIOVERSION ELECTRIC EXT: CPT | Performed by: EMERGENCY MEDICINE

## 2021-10-25 PROCEDURE — 80053 COMPREHEN METABOLIC PANEL: CPT | Performed by: EMERGENCY MEDICINE

## 2021-10-25 PROCEDURE — 93005 ELECTROCARDIOGRAM TRACING: CPT

## 2021-10-25 PROCEDURE — 36415 COLL VENOUS BLD VENIPUNCTURE: CPT

## 2021-10-25 PROCEDURE — 87040 BLOOD CULTURE FOR BACTERIA: CPT | Performed by: EMERGENCY MEDICINE

## 2021-10-25 PROCEDURE — 0241U HB NFCT DS VIR RESP RNA 4 TRGT: CPT | Performed by: EMERGENCY MEDICINE

## 2021-10-25 PROCEDURE — 81001 URINALYSIS AUTO W/SCOPE: CPT | Performed by: EMERGENCY MEDICINE

## 2021-10-25 PROCEDURE — 96368 THER/DIAG CONCURRENT INF: CPT

## 2021-10-25 PROCEDURE — 87077 CULTURE AEROBIC IDENTIFY: CPT | Performed by: EMERGENCY MEDICINE

## 2021-10-25 PROCEDURE — 71045 X-RAY EXAM CHEST 1 VIEW: CPT

## 2021-10-25 PROCEDURE — 85730 THROMBOPLASTIN TIME PARTIAL: CPT | Performed by: EMERGENCY MEDICINE

## 2021-10-25 PROCEDURE — 70450 CT HEAD/BRAIN W/O DYE: CPT

## 2021-10-25 PROCEDURE — 82805 BLOOD GASES W/O2 SATURATION: CPT | Performed by: EMERGENCY MEDICINE

## 2021-10-25 PROCEDURE — 85610 PROTHROMBIN TIME: CPT | Performed by: EMERGENCY MEDICINE

## 2021-10-25 PROCEDURE — 83605 ASSAY OF LACTIC ACID: CPT | Performed by: EMERGENCY MEDICINE

## 2021-10-25 PROCEDURE — 84484 ASSAY OF TROPONIN QUANT: CPT | Performed by: EMERGENCY MEDICINE

## 2021-10-25 PROCEDURE — 83880 ASSAY OF NATRIURETIC PEPTIDE: CPT | Performed by: EMERGENCY MEDICINE

## 2021-10-25 PROCEDURE — 31500 INSERT EMERGENCY AIRWAY: CPT | Performed by: EMERGENCY MEDICINE

## 2021-10-25 PROCEDURE — 99292 CRITICAL CARE ADDL 30 MIN: CPT | Performed by: EMERGENCY MEDICINE

## 2021-10-25 PROCEDURE — G1004 CDSM NDSC: HCPCS

## 2021-10-25 PROCEDURE — 83735 ASSAY OF MAGNESIUM: CPT | Performed by: EMERGENCY MEDICINE

## 2021-10-25 PROCEDURE — 99285 EMERGENCY DEPT VISIT HI MDM: CPT

## 2021-10-25 PROCEDURE — 71275 CT ANGIOGRAPHY CHEST: CPT

## 2021-10-25 PROCEDURE — 96365 THER/PROPH/DIAG IV INF INIT: CPT

## 2021-10-25 PROCEDURE — 87086 URINE CULTURE/COLONY COUNT: CPT | Performed by: EMERGENCY MEDICINE

## 2021-10-25 PROCEDURE — 94002 VENT MGMT INPAT INIT DAY: CPT

## 2021-10-25 PROCEDURE — 96366 THER/PROPH/DIAG IV INF ADDON: CPT

## 2021-10-25 RX ORDER — MIDAZOLAM HYDROCHLORIDE 2 MG/2ML
INJECTION, SOLUTION INTRAMUSCULAR; INTRAVENOUS
Status: COMPLETED
Start: 2021-10-25 | End: 2021-10-25

## 2021-10-25 RX ORDER — PROPOFOL 10 MG/ML
INJECTION, EMULSION INTRAVENOUS
Status: COMPLETED
Start: 2021-10-25 | End: 2021-10-25

## 2021-10-25 RX ORDER — BUMETANIDE 0.25 MG/ML
1 INJECTION, SOLUTION INTRAMUSCULAR; INTRAVENOUS ONCE
Status: DISCONTINUED | OUTPATIENT
Start: 2021-10-25 | End: 2021-10-26 | Stop reason: HOSPADM

## 2021-10-25 RX ORDER — LORAZEPAM 2 MG/ML
2 INJECTION INTRAMUSCULAR ONCE
Status: COMPLETED | OUTPATIENT
Start: 2021-10-25 | End: 2021-10-25

## 2021-10-25 RX ORDER — HYDROMORPHONE HCL/PF 1 MG/ML
0.25 SYRINGE (ML) INJECTION ONCE
Status: COMPLETED | OUTPATIENT
Start: 2021-10-25 | End: 2021-10-25

## 2021-10-25 RX ORDER — MIDAZOLAM HYDROCHLORIDE 2 MG/2ML
4 INJECTION, SOLUTION INTRAMUSCULAR; INTRAVENOUS ONCE
Status: COMPLETED | OUTPATIENT
Start: 2021-10-25 | End: 2021-10-25

## 2021-10-25 RX ORDER — ETOMIDATE 2 MG/ML
40 INJECTION INTRAVENOUS ONCE
Status: COMPLETED | OUTPATIENT
Start: 2021-10-25 | End: 2021-10-25

## 2021-10-25 RX ORDER — METHYLPREDNISOLONE SOD SUCC 125 MG
1 VIAL (EA) INJECTION ONCE
Status: COMPLETED | OUTPATIENT
Start: 2021-10-25 | End: 2021-10-25

## 2021-10-25 RX ORDER — CEFTRIAXONE 2 G/50ML
2000 INJECTION, SOLUTION INTRAVENOUS ONCE
Status: COMPLETED | OUTPATIENT
Start: 2021-10-25 | End: 2021-10-25

## 2021-10-25 RX ORDER — LORAZEPAM 2 MG/ML
3 INJECTION INTRAMUSCULAR ONCE
Status: COMPLETED | OUTPATIENT
Start: 2021-10-25 | End: 2021-10-25

## 2021-10-25 RX ORDER — LORAZEPAM 2 MG/ML
4 INJECTION INTRAMUSCULAR ONCE
Status: COMPLETED | OUTPATIENT
Start: 2021-10-25 | End: 2021-10-25

## 2021-10-25 RX ORDER — VECURONIUM BROMIDE 1 MG/ML
15 INJECTION, POWDER, LYOPHILIZED, FOR SOLUTION INTRAVENOUS ONCE
Status: COMPLETED | OUTPATIENT
Start: 2021-10-25 | End: 2021-10-25

## 2021-10-25 RX ORDER — MAGNESIUM SULFATE HEPTAHYDRATE 40 MG/ML
2 INJECTION, SOLUTION INTRAVENOUS
Status: DISPENSED | OUTPATIENT
Start: 2021-10-25 | End: 2021-10-25

## 2021-10-25 RX ORDER — DILTIAZEM HYDROCHLORIDE 5 MG/ML
15 INJECTION INTRAVENOUS ONCE
Status: COMPLETED | OUTPATIENT
Start: 2021-10-25 | End: 2021-10-25

## 2021-10-25 RX ORDER — HALOPERIDOL 5 MG/ML
2.5 INJECTION INTRAMUSCULAR ONCE
Status: COMPLETED | OUTPATIENT
Start: 2021-10-25 | End: 2021-10-25

## 2021-10-25 RX ORDER — PROPOFOL 10 MG/ML
5-50 INJECTION, EMULSION INTRAVENOUS
Status: DISCONTINUED | OUTPATIENT
Start: 2021-10-25 | End: 2021-10-26 | Stop reason: HOSPADM

## 2021-10-25 RX ORDER — LORAZEPAM 2 MG/ML
4 INJECTION INTRAMUSCULAR ONCE
Status: DISCONTINUED | OUTPATIENT
Start: 2021-10-25 | End: 2021-10-25

## 2021-10-25 RX ORDER — DILTIAZEM HYDROCHLORIDE 5 MG/ML
INJECTION INTRAVENOUS
Status: COMPLETED
Start: 2021-10-25 | End: 2021-10-25

## 2021-10-25 RX ORDER — ALBUTEROL SULFATE 2.5 MG/3ML
2 SOLUTION RESPIRATORY (INHALATION) ONCE
Status: COMPLETED | OUTPATIENT
Start: 2021-10-25 | End: 2021-10-25

## 2021-10-25 RX ORDER — IPRATROPIUM BROMIDE AND ALBUTEROL SULFATE .5; 3 MG/3ML; MG/3ML
1 SOLUTION RESPIRATORY (INHALATION) ONCE
Status: COMPLETED | OUTPATIENT
Start: 2021-10-25 | End: 2021-10-25

## 2021-10-25 RX ADMIN — LORAZEPAM 2 MG: 2 INJECTION INTRAMUSCULAR; INTRAVENOUS at 18:45

## 2021-10-25 RX ADMIN — DILTIAZEM HYDROCHLORIDE 5 MG/HR: 5 INJECTION, SOLUTION INTRAVENOUS at 17:32

## 2021-10-25 RX ADMIN — LORAZEPAM 4 MG: 2 INJECTION INTRAMUSCULAR; INTRAVENOUS at 22:36

## 2021-10-25 RX ADMIN — LORAZEPAM 3 MG: 2 INJECTION INTRAMUSCULAR; INTRAVENOUS at 20:27

## 2021-10-25 RX ADMIN — DILTIAZEM HYDROCHLORIDE 15 MG: 5 INJECTION INTRAVENOUS at 15:47

## 2021-10-25 RX ADMIN — ETOMIDATE 40 MG: 20 INJECTION, SOLUTION INTRAVENOUS at 16:28

## 2021-10-25 RX ADMIN — PROPOFOL 10 MCG/KG/MIN: 10 INJECTION, EMULSION INTRAVENOUS at 16:41

## 2021-10-25 RX ADMIN — LORAZEPAM 2 MG: 2 INJECTION INTRAMUSCULAR; INTRAVENOUS at 19:18

## 2021-10-25 RX ADMIN — MIDAZOLAM 4 MG: 1 INJECTION INTRAMUSCULAR; INTRAVENOUS at 16:12

## 2021-10-25 RX ADMIN — IOHEXOL 100 ML: 350 INJECTION, SOLUTION INTRAVENOUS at 17:12

## 2021-10-25 RX ADMIN — DILTIAZEM HYDROCHLORIDE 15 MG: 5 INJECTION INTRAVENOUS at 17:56

## 2021-10-25 RX ADMIN — LEVETIRACETAM 4000 MG: 100 INJECTION, SOLUTION INTRAVENOUS at 19:28

## 2021-10-25 RX ADMIN — MAGNESIUM SULFATE HEPTAHYDRATE 2 G: 40 INJECTION, SOLUTION INTRAVENOUS at 20:49

## 2021-10-25 RX ADMIN — MIDAZOLAM HYDROCHLORIDE 4 MG: 2 INJECTION, SOLUTION INTRAMUSCULAR; INTRAVENOUS at 16:12

## 2021-10-25 RX ADMIN — CEFTRIAXONE 2000 MG: 2 INJECTION, SOLUTION INTRAVENOUS at 13:23

## 2021-10-25 RX ADMIN — HYDROMORPHONE HYDROCHLORIDE 0.25 MG: 1 INJECTION, SOLUTION INTRAMUSCULAR; INTRAVENOUS; SUBCUTANEOUS at 18:48

## 2021-10-25 RX ADMIN — HALOPERIDOL LACTATE 2.5 MG: 5 INJECTION, SOLUTION INTRAMUSCULAR at 15:55

## 2021-10-25 RX ADMIN — VANCOMYCIN HYDROCHLORIDE 1750 MG: 1 INJECTION, POWDER, LYOPHILIZED, FOR SOLUTION INTRAVENOUS at 18:23

## 2021-10-25 RX ADMIN — VECURONIUM BROMIDE 15 MG: 1 INJECTION, POWDER, LYOPHILIZED, FOR SOLUTION INTRAVENOUS at 16:29

## 2021-10-26 ENCOUNTER — APPOINTMENT (INPATIENT)
Dept: NEUROLOGY | Facility: CLINIC | Age: 61
DRG: 100 | End: 2021-10-26
Payer: MEDICARE

## 2021-10-26 ENCOUNTER — APPOINTMENT (INPATIENT)
Dept: RADIOLOGY | Facility: HOSPITAL | Age: 61
DRG: 100 | End: 2021-10-26
Payer: MEDICARE

## 2021-10-26 ENCOUNTER — HOSPITAL ENCOUNTER (INPATIENT)
Facility: HOSPITAL | Age: 61
LOS: 14 days | Discharge: NURSING, REHAB OR LONG-TERM CARE | DRG: 100 | End: 2021-11-09
Attending: EMERGENCY MEDICINE | Admitting: EMERGENCY MEDICINE
Payer: MEDICARE

## 2021-10-26 DIAGNOSIS — N18.9 ACUTE RENAL FAILURE WITH ACUTE TUBULAR NECROSIS SUPERIMPOSED ON CHRONIC KIDNEY DISEASE, ON CHRONIC DIALYSIS (HCC): ICD-10-CM

## 2021-10-26 DIAGNOSIS — R56.9 SEIZURE (HCC): Primary | ICD-10-CM

## 2021-10-26 DIAGNOSIS — Z99.2 ACUTE RENAL FAILURE WITH ACUTE TUBULAR NECROSIS SUPERIMPOSED ON CHRONIC KIDNEY DISEASE, ON CHRONIC DIALYSIS (HCC): ICD-10-CM

## 2021-10-26 DIAGNOSIS — F32.A DEPRESSION: ICD-10-CM

## 2021-10-26 DIAGNOSIS — I48.0 PAROXYSMAL ATRIAL FIBRILLATION (HCC): ICD-10-CM

## 2021-10-26 DIAGNOSIS — N17.0 ACUTE RENAL FAILURE WITH ACUTE TUBULAR NECROSIS SUPERIMPOSED ON CHRONIC KIDNEY DISEASE, ON CHRONIC DIALYSIS (HCC): ICD-10-CM

## 2021-10-26 DIAGNOSIS — G93.40 ENCEPHALOPATHY: ICD-10-CM

## 2021-10-26 DIAGNOSIS — R60.0 LOWER EXTREMITY EDEMA: ICD-10-CM

## 2021-10-26 DIAGNOSIS — I47.2 WIDE-COMPLEX TACHYCARDIA (HCC): ICD-10-CM

## 2021-10-26 PROBLEM — N39.0 UTI (URINARY TRACT INFECTION): Status: ACTIVE | Noted: 2021-10-26

## 2021-10-26 PROBLEM — J96.22 ACUTE ON CHRONIC RESPIRATORY FAILURE WITH HYPOXIA AND HYPERCAPNIA (HCC): Status: ACTIVE | Noted: 2021-09-13

## 2021-10-26 PROBLEM — J96.21 ACUTE ON CHRONIC RESPIRATORY FAILURE WITH HYPOXIA AND HYPERCAPNIA (HCC): Status: ACTIVE | Noted: 2021-09-13

## 2021-10-26 LAB
ALBUMIN SERPL BCP-MCNC: 2.8 G/DL (ref 3.5–5)
ALP SERPL-CCNC: 72 U/L (ref 46–116)
ALT SERPL W P-5'-P-CCNC: 19 U/L (ref 12–78)
ANION GAP SERPL CALCULATED.3IONS-SCNC: 5 MMOL/L (ref 4–13)
APTT PPP: 26 SECONDS (ref 23–37)
APTT PPP: 32 SECONDS (ref 23–37)
AST SERPL W P-5'-P-CCNC: 19 U/L (ref 5–45)
ATRIAL RATE: 100 BPM
ATRIAL RATE: 84 BPM
ATRIAL RATE: 91 BPM
BASOPHILS # BLD AUTO: 0.01 THOUSANDS/ΜL (ref 0–0.1)
BASOPHILS NFR BLD AUTO: 0 % (ref 0–1)
BILIRUB SERPL-MCNC: 0.98 MG/DL (ref 0.2–1)
BUN SERPL-MCNC: 17 MG/DL (ref 5–25)
CALCIUM ALBUM COR SERPL-MCNC: 9.7 MG/DL (ref 8.3–10.1)
CALCIUM SERPL-MCNC: 8.7 MG/DL (ref 8.3–10.1)
CHLORIDE SERPL-SCNC: 103 MMOL/L (ref 100–108)
CO2 SERPL-SCNC: 31 MMOL/L (ref 21–32)
CREAT SERPL-MCNC: 1.07 MG/DL (ref 0.6–1.3)
EOSINOPHIL # BLD AUTO: 0 THOUSAND/ΜL (ref 0–0.61)
EOSINOPHIL NFR BLD AUTO: 0 % (ref 0–6)
ERYTHROCYTE [DISTWIDTH] IN BLOOD BY AUTOMATED COUNT: 18.9 % (ref 11.6–15.1)
GFR SERPL CREATININE-BSD FRML MDRD: 74 ML/MIN/1.73SQ M
GLUCOSE SERPL-MCNC: 106 MG/DL (ref 65–140)
GLUCOSE SERPL-MCNC: 114 MG/DL (ref 65–140)
HCT VFR BLD AUTO: 33.3 % (ref 36.5–49.3)
HGB BLD-MCNC: 9 G/DL (ref 12–17)
IMM GRANULOCYTES # BLD AUTO: 0.05 THOUSAND/UL (ref 0–0.2)
IMM GRANULOCYTES NFR BLD AUTO: 1 % (ref 0–2)
INR PPP: 1.12 (ref 0.84–1.19)
LYMPHOCYTES # BLD AUTO: 0.19 THOUSANDS/ΜL (ref 0.6–4.47)
LYMPHOCYTES NFR BLD AUTO: 3 % (ref 14–44)
MCH RBC QN AUTO: 21.9 PG (ref 26.8–34.3)
MCHC RBC AUTO-ENTMCNC: 27 G/DL (ref 31.4–37.4)
MCV RBC AUTO: 81 FL (ref 82–98)
MONOCYTES # BLD AUTO: 0.59 THOUSAND/ΜL (ref 0.17–1.22)
MONOCYTES NFR BLD AUTO: 9 % (ref 4–12)
NEUTROPHILS # BLD AUTO: 5.99 THOUSANDS/ΜL (ref 1.85–7.62)
NEUTS SEG NFR BLD AUTO: 87 % (ref 43–75)
NRBC BLD AUTO-RTO: 1 /100 WBCS
P AXIS: 149 DEGREES
P AXIS: 42 DEGREES
P AXIS: 45 DEGREES
PLATELET # BLD AUTO: 152 THOUSANDS/UL (ref 149–390)
POTASSIUM SERPL-SCNC: 4.7 MMOL/L (ref 3.5–5.3)
PR INTERVAL: 192 MS
PR INTERVAL: 204 MS
PR INTERVAL: 206 MS
PROCALCITONIN SERPL-MCNC: <0.05 NG/ML
PROT SERPL-MCNC: 6.5 G/DL (ref 6.4–8.2)
PROTHROMBIN TIME: 14 SECONDS (ref 11.6–14.5)
QRS AXIS: 190 DEGREES
QRS AXIS: 23 DEGREES
QRS AXIS: 5 DEGREES
QRSD INTERVAL: 138 MS
QRSD INTERVAL: 138 MS
QRSD INTERVAL: 144 MS
QT INTERVAL: 390 MS
QT INTERVAL: 394 MS
QT INTERVAL: 433 MS
QTC INTERVAL: 479 MS
QTC INTERVAL: 508 MS
QTC INTERVAL: 512 MS
RBC # BLD AUTO: 4.11 MILLION/UL (ref 3.88–5.62)
SODIUM SERPL-SCNC: 139 MMOL/L (ref 136–145)
T WAVE AXIS: 115 DEGREES
T WAVE AXIS: 141 DEGREES
T WAVE AXIS: 93 DEGREES
VENTRICULAR RATE: 100 BPM
VENTRICULAR RATE: 84 BPM
VENTRICULAR RATE: 91 BPM
WBC # BLD AUTO: 6.83 THOUSAND/UL (ref 4.31–10.16)

## 2021-10-26 PROCEDURE — 5A1945Z RESPIRATORY VENTILATION, 24-96 CONSECUTIVE HOURS: ICD-10-PCS | Performed by: INTERNAL MEDICINE

## 2021-10-26 PROCEDURE — 85025 COMPLETE CBC W/AUTO DIFF WBC: CPT | Performed by: EMERGENCY MEDICINE

## 2021-10-26 PROCEDURE — 80053 COMPREHEN METABOLIC PANEL: CPT | Performed by: EMERGENCY MEDICINE

## 2021-10-26 PROCEDURE — 4A133J1 MONITORING OF ARTERIAL PULSE, PERIPHERAL, PERCUTANEOUS APPROACH: ICD-10-PCS | Performed by: EMERGENCY MEDICINE

## 2021-10-26 PROCEDURE — 87081 CULTURE SCREEN ONLY: CPT | Performed by: PHYSICIAN ASSISTANT

## 2021-10-26 PROCEDURE — 36620 INSERTION CATHETER ARTERY: CPT | Performed by: EMERGENCY MEDICINE

## 2021-10-26 PROCEDURE — 4A133B1 MONITORING OF ARTERIAL PRESSURE, PERIPHERAL, PERCUTANEOUS APPROACH: ICD-10-PCS | Performed by: EMERGENCY MEDICINE

## 2021-10-26 PROCEDURE — 71045 X-RAY EXAM CHEST 1 VIEW: CPT

## 2021-10-26 PROCEDURE — 93010 ELECTROCARDIOGRAM REPORT: CPT | Performed by: INTERNAL MEDICINE

## 2021-10-26 PROCEDURE — 99292 CRITICAL CARE ADDL 30 MIN: CPT | Performed by: EMERGENCY MEDICINE

## 2021-10-26 PROCEDURE — 94002 VENT MGMT INPAT INIT DAY: CPT

## 2021-10-26 PROCEDURE — 99291 CRITICAL CARE FIRST HOUR: CPT | Performed by: PSYCHIATRY & NEUROLOGY

## 2021-10-26 PROCEDURE — 95700 EEG CONT REC W/VID EEG TECH: CPT

## 2021-10-26 PROCEDURE — 85730 THROMBOPLASTIN TIME PARTIAL: CPT | Performed by: EMERGENCY MEDICINE

## 2021-10-26 PROCEDURE — 03HY32Z INSERTION OF MONITORING DEVICE INTO UPPER ARTERY, PERCUTANEOUS APPROACH: ICD-10-PCS | Performed by: EMERGENCY MEDICINE

## 2021-10-26 PROCEDURE — 99291 CRITICAL CARE FIRST HOUR: CPT | Performed by: PHYSICIAN ASSISTANT

## 2021-10-26 PROCEDURE — 36556 INSERT NON-TUNNEL CV CATH: CPT | Performed by: EMERGENCY MEDICINE

## 2021-10-26 PROCEDURE — 85730 THROMBOPLASTIN TIME PARTIAL: CPT | Performed by: NURSE PRACTITIONER

## 2021-10-26 PROCEDURE — 82948 REAGENT STRIP/BLOOD GLUCOSE: CPT

## 2021-10-26 PROCEDURE — 5A2204Z RESTORATION OF CARDIAC RHYTHM, SINGLE: ICD-10-PCS | Performed by: INTERNAL MEDICINE

## 2021-10-26 PROCEDURE — 94003 VENT MGMT INPAT SUBQ DAY: CPT

## 2021-10-26 PROCEDURE — 95715 VEEG EA 12-26HR INTMT MNTR: CPT

## 2021-10-26 PROCEDURE — 87040 BLOOD CULTURE FOR BACTERIA: CPT | Performed by: PHYSICIAN ASSISTANT

## 2021-10-26 PROCEDURE — NC001 PR NO CHARGE: Performed by: EMERGENCY MEDICINE

## 2021-10-26 PROCEDURE — 93005 ELECTROCARDIOGRAM TRACING: CPT

## 2021-10-26 PROCEDURE — 94760 N-INVAS EAR/PLS OXIMETRY 1: CPT

## 2021-10-26 PROCEDURE — 02HV33Z INSERTION OF INFUSION DEVICE INTO SUPERIOR VENA CAVA, PERCUTANEOUS APPROACH: ICD-10-PCS | Performed by: EMERGENCY MEDICINE

## 2021-10-26 PROCEDURE — 85610 PROTHROMBIN TIME: CPT | Performed by: NURSE PRACTITIONER

## 2021-10-26 RX ORDER — ATORVASTATIN CALCIUM 40 MG/1
1 TABLET, FILM COATED ORAL EVERY 24 HOURS
COMMUNITY
Start: 2021-08-20 | End: 2021-11-09 | Stop reason: HOSPADM

## 2021-10-26 RX ORDER — CHOLECALCIFEROL (VITAMIN D3) 10 MCG
TABLET ORAL EVERY 24 HOURS
COMMUNITY
Start: 2021-10-15

## 2021-10-26 RX ORDER — DIPHENHYDRAMINE HYDROCHLORIDE 50 MG/ML
25 INJECTION INTRAMUSCULAR; INTRAVENOUS EVERY 6 HOURS PRN
Status: DISCONTINUED | OUTPATIENT
Start: 2021-10-26 | End: 2021-10-28

## 2021-10-26 RX ORDER — SERTRALINE HYDROCHLORIDE 25 MG/1
1 TABLET, FILM COATED ORAL EVERY 24 HOURS
COMMUNITY
Start: 2021-08-20 | End: 2021-11-09 | Stop reason: HOSPADM

## 2021-10-26 RX ORDER — HEPARIN SODIUM 5000 [USP'U]/ML
7500 INJECTION, SOLUTION INTRAVENOUS; SUBCUTANEOUS EVERY 8 HOURS SCHEDULED
Status: DISCONTINUED | OUTPATIENT
Start: 2021-10-26 | End: 2021-10-26

## 2021-10-26 RX ORDER — BISACODYL 10 MG
SUPPOSITORY, RECTAL RECTAL
COMMUNITY
Start: 2021-08-19 | End: 2021-11-09 | Stop reason: HOSPADM

## 2021-10-26 RX ORDER — CLOTRIMAZOLE AND BETAMETHASONE DIPROPIONATE 10; .64 MG/G; MG/G
CREAM TOPICAL
COMMUNITY
Start: 2021-10-24

## 2021-10-26 RX ORDER — CALCIUM CARBONATE/VITAMIN D3 500-10/5ML
400 LIQUID (ML) ORAL EVERY 24 HOURS
COMMUNITY
Start: 2021-08-20 | End: 2021-11-09 | Stop reason: HOSPADM

## 2021-10-26 RX ORDER — LORAZEPAM 2 MG/ML
4 INJECTION INTRAMUSCULAR ONCE
Status: COMPLETED | OUTPATIENT
Start: 2021-10-26 | End: 2021-10-26

## 2021-10-26 RX ORDER — LORAZEPAM 2 MG/ML
INJECTION INTRAMUSCULAR
Status: COMPLETED
Start: 2021-10-26 | End: 2021-10-26

## 2021-10-26 RX ORDER — FUROSEMIDE 10 MG/ML
40 INJECTION INTRAMUSCULAR; INTRAVENOUS ONCE
Status: COMPLETED | OUTPATIENT
Start: 2021-10-26 | End: 2021-10-26

## 2021-10-26 RX ORDER — BISACODYL 10 MG
10 SUPPOSITORY, RECTAL RECTAL DAILY PRN
Status: DISCONTINUED | OUTPATIENT
Start: 2021-10-26 | End: 2021-11-09 | Stop reason: HOSPADM

## 2021-10-26 RX ORDER — IPRATROPIUM BROMIDE AND ALBUTEROL SULFATE 2.5; .5 MG/3ML; MG/3ML
SOLUTION RESPIRATORY (INHALATION)
COMMUNITY
Start: 2021-09-28

## 2021-10-26 RX ORDER — HEPARIN SODIUM 10000 [USP'U]/100ML
3-20 INJECTION, SOLUTION INTRAVENOUS
Status: DISCONTINUED | OUTPATIENT
Start: 2021-10-26 | End: 2021-11-01

## 2021-10-26 RX ORDER — CHLORHEXIDINE GLUCONATE 0.12 MG/ML
15 RINSE ORAL EVERY 12 HOURS SCHEDULED
Status: DISCONTINUED | OUTPATIENT
Start: 2021-10-26 | End: 2021-10-29

## 2021-10-26 RX ORDER — PANTOPRAZOLE SODIUM 40 MG/1
1 TABLET, DELAYED RELEASE ORAL EVERY 12 HOURS
COMMUNITY
Start: 2021-08-20

## 2021-10-26 RX ORDER — ASPIRIN 81 MG/1
81 TABLET, CHEWABLE ORAL DAILY
Status: DISCONTINUED | OUTPATIENT
Start: 2021-10-26 | End: 2021-11-01

## 2021-10-26 RX ORDER — SODIUM CHLORIDE, SODIUM GLUCONATE, SODIUM ACETATE, POTASSIUM CHLORIDE, MAGNESIUM CHLORIDE, SODIUM PHOSPHATE, DIBASIC, AND POTASSIUM PHOSPHATE .53; .5; .37; .037; .03; .012; .00082 G/100ML; G/100ML; G/100ML; G/100ML; G/100ML; G/100ML; G/100ML
2250 INJECTION, SOLUTION INTRAVENOUS ONCE
Status: COMPLETED | OUTPATIENT
Start: 2021-10-26 | End: 2021-10-26

## 2021-10-26 RX ORDER — HEPARIN SODIUM 1000 [USP'U]/ML
4000 INJECTION, SOLUTION INTRAVENOUS; SUBCUTANEOUS
Status: DISCONTINUED | OUTPATIENT
Start: 2021-10-26 | End: 2021-11-01

## 2021-10-26 RX ORDER — METOPROLOL TARTRATE 50 MG/1
50 TABLET, FILM COATED ORAL 3 TIMES DAILY
COMMUNITY
Start: 2021-08-19 | End: 2021-11-09 | Stop reason: HOSPADM

## 2021-10-26 RX ORDER — LANOLIN ALCOHOL/MO/W.PET/CERES
1 CREAM (GRAM) TOPICAL
COMMUNITY
Start: 2021-08-19

## 2021-10-26 RX ORDER — SERTRALINE HYDROCHLORIDE 100 MG/1
1 TABLET, FILM COATED ORAL EVERY 24 HOURS
COMMUNITY
Start: 2021-08-20

## 2021-10-26 RX ORDER — ALLOPURINOL 100 MG/1
1 TABLET ORAL EVERY 24 HOURS
COMMUNITY
Start: 2021-08-20 | End: 2021-11-09 | Stop reason: HOSPADM

## 2021-10-26 RX ORDER — PROPOFOL 10 MG/ML
INJECTION, EMULSION INTRAVENOUS
Status: COMPLETED
Start: 2021-10-26 | End: 2021-10-26

## 2021-10-26 RX ORDER — ASPIRIN 81 MG/1
81 TABLET, CHEWABLE ORAL EVERY 24 HOURS
COMMUNITY
Start: 2021-08-20

## 2021-10-26 RX ORDER — ATORVASTATIN CALCIUM 40 MG/1
40 TABLET, FILM COATED ORAL
Status: DISCONTINUED | OUTPATIENT
Start: 2021-10-26 | End: 2021-11-09 | Stop reason: HOSPADM

## 2021-10-26 RX ORDER — NOREPINEPHRINE BITARTRATE 1 MG/ML
INJECTION, SOLUTION INTRAVENOUS
Status: DISPENSED
Start: 2021-10-26 | End: 2021-10-26

## 2021-10-26 RX ORDER — LORAZEPAM 2 MG/ML
2 INJECTION INTRAMUSCULAR EVERY 4 HOURS PRN
Status: DISCONTINUED | OUTPATIENT
Start: 2021-10-26 | End: 2021-10-31

## 2021-10-26 RX ORDER — ACETAMINOPHEN 325 MG/1
2 TABLET ORAL
Status: ON HOLD | COMMUNITY
Start: 2021-08-20 | End: 2021-11-09 | Stop reason: SDUPTHER

## 2021-10-26 RX ORDER — PROPOFOL 10 MG/ML
5-50 INJECTION, EMULSION INTRAVENOUS
Status: DISCONTINUED | OUTPATIENT
Start: 2021-10-26 | End: 2021-10-28

## 2021-10-26 RX ORDER — HEPARIN SODIUM 1000 [USP'U]/ML
2000 INJECTION, SOLUTION INTRAVENOUS; SUBCUTANEOUS
Status: DISCONTINUED | OUTPATIENT
Start: 2021-10-26 | End: 2021-11-01

## 2021-10-26 RX ADMIN — LORAZEPAM 4 MG: 2 INJECTION INTRAMUSCULAR; INTRAVENOUS at 03:55

## 2021-10-26 RX ADMIN — PROPOFOL 15 MCG/KG/MIN: 10 INJECTION, EMULSION INTRAVENOUS at 16:58

## 2021-10-26 RX ADMIN — LORAZEPAM 2 MG: 2 INJECTION INTRAMUSCULAR; INTRAVENOUS at 01:12

## 2021-10-26 RX ADMIN — Medication 20 MG: at 13:10

## 2021-10-26 RX ADMIN — HEPARIN SODIUM 7500 UNITS: 5000 INJECTION INTRAVENOUS; SUBCUTANEOUS at 03:10

## 2021-10-26 RX ADMIN — NOREPINEPHRINE BITARTRATE 3 MCG/MIN: 1 INJECTION, SOLUTION, CONCENTRATE INTRAVENOUS at 01:15

## 2021-10-26 RX ADMIN — CEFEPIME HYDROCHLORIDE 2000 MG: 2 INJECTION, POWDER, FOR SOLUTION INTRAVENOUS at 13:10

## 2021-10-26 RX ADMIN — LORAZEPAM 2 MG: 2 INJECTION INTRAMUSCULAR; INTRAVENOUS at 01:11

## 2021-10-26 RX ADMIN — LEVETIRACETAM 1500 MG: 100 INJECTION INTRAVENOUS at 07:34

## 2021-10-26 RX ADMIN — LEVETIRACETAM 1500 MG: 100 INJECTION INTRAVENOUS at 19:19

## 2021-10-26 RX ADMIN — FUROSEMIDE 40 MG: 10 INJECTION, SOLUTION INTRAMUSCULAR; INTRAVENOUS at 14:12

## 2021-10-26 RX ADMIN — LORAZEPAM 4 MG: 2 INJECTION INTRAMUSCULAR; INTRAVENOUS at 01:21

## 2021-10-26 RX ADMIN — ASPIRIN 81 MG CHEWABLE TABLET 81 MG: 81 TABLET CHEWABLE at 09:17

## 2021-10-26 RX ADMIN — PROPOFOL 10 MCG/KG/MIN: 10 INJECTION, EMULSION INTRAVENOUS at 01:13

## 2021-10-26 RX ADMIN — SODIUM CHLORIDE, SODIUM GLUCONATE, SODIUM ACETATE, POTASSIUM CHLORIDE, MAGNESIUM CHLORIDE, SODIUM PHOSPHATE, DIBASIC, AND POTASSIUM PHOSPHATE 2250 ML: .53; .5; .37; .037; .03; .012; .00082 INJECTION, SOLUTION INTRAVENOUS at 03:10

## 2021-10-26 RX ADMIN — CHLORHEXIDINE GLUCONATE 15 ML: 1.2 SOLUTION ORAL at 03:11

## 2021-10-26 RX ADMIN — MIDAZOLAM 4 MG/HR: 5 INJECTION INTRAMUSCULAR; INTRAVENOUS at 02:15

## 2021-10-26 RX ADMIN — HEPARIN SODIUM 11.1 UNITS/KG/HR: 10000 INJECTION, SOLUTION INTRAVENOUS at 11:08

## 2021-10-26 RX ADMIN — ATORVASTATIN CALCIUM 40 MG: 40 TABLET, FILM COATED ORAL at 16:34

## 2021-10-26 RX ADMIN — CEFEPIME HYDROCHLORIDE 2000 MG: 2 INJECTION, POWDER, FOR SOLUTION INTRAVENOUS at 03:11

## 2021-10-26 RX ADMIN — LORAZEPAM 4 MG: 2 INJECTION INTRAMUSCULAR; INTRAVENOUS at 04:23

## 2021-10-26 RX ADMIN — MIDAZOLAM 4 MG/HR: 5 INJECTION INTRAMUSCULAR; INTRAVENOUS at 12:24

## 2021-10-26 RX ADMIN — DIPHENHYDRAMINE HYDROCHLORIDE 25 MG: 50 INJECTION, SOLUTION INTRAMUSCULAR; INTRAVENOUS at 03:10

## 2021-10-26 RX ADMIN — CHLORHEXIDINE GLUCONATE 15 ML: 1.2 SOLUTION ORAL at 09:17

## 2021-10-26 RX ADMIN — Medication 20 MG: at 03:10

## 2021-10-26 RX ADMIN — PROPOFOL 15 MCG/KG/MIN: 10 INJECTION, EMULSION INTRAVENOUS at 06:38

## 2021-10-27 ENCOUNTER — APPOINTMENT (INPATIENT)
Dept: NEUROLOGY | Facility: CLINIC | Age: 61
DRG: 100 | End: 2021-10-27
Payer: MEDICARE

## 2021-10-27 ENCOUNTER — APPOINTMENT (INPATIENT)
Dept: NON INVASIVE DIAGNOSTICS | Facility: HOSPITAL | Age: 61
DRG: 100 | End: 2021-10-27
Payer: MEDICARE

## 2021-10-27 LAB
ABO GROUP BLD: NORMAL
ALBUMIN SERPL BCP-MCNC: 2.8 G/DL (ref 3.5–5)
ALP SERPL-CCNC: 73 U/L (ref 46–116)
ALT SERPL W P-5'-P-CCNC: 18 U/L (ref 12–78)
ANION GAP SERPL CALCULATED.3IONS-SCNC: 6 MMOL/L (ref 4–13)
ANION GAP SERPL CALCULATED.3IONS-SCNC: 7 MMOL/L (ref 4–13)
AORTIC ROOT: 3.6 CM
AORTIC VALVE MEAN VELOCITY: 20.2 M/S
APICAL FOUR CHAMBER EJECTION FRACTION: 50 %
APTT PPP: 36 SECONDS (ref 23–37)
APTT PPP: 69 SECONDS (ref 23–37)
APTT PPP: 70 SECONDS (ref 23–37)
AST SERPL W P-5'-P-CCNC: 22 U/L (ref 5–45)
ATRIAL RATE: 153 BPM
ATRIAL RATE: 74 BPM
ATRIAL RATE: 81 BPM
ATRIAL RATE: 92 BPM
AV AREA BY CONTINUOUS VTI: 1.3 CM2
AV AREA PEAK VELOCITY: 1.2 CM2
AV LVOT MEAN GRADIENT: 3 MMHG
AV LVOT PEAK GRADIENT: 4 MMHG
AV MEAN GRADIENT: 18 MMHG
AV PEAK GRADIENT: 32 MMHG
AV VALVE AREA: 1.43 CM2
AV VELOCITY RATIO: 0.36
BACTERIA UR CULT: ABNORMAL
BILIRUB SERPL-MCNC: 1.02 MG/DL (ref 0.2–1)
BLD GP AB SCN SERPL QL: NEGATIVE
BUN SERPL-MCNC: 19 MG/DL (ref 5–25)
BUN SERPL-MCNC: 19 MG/DL (ref 5–25)
CALCIUM ALBUM COR SERPL-MCNC: 9.8 MG/DL (ref 8.3–10.1)
CALCIUM SERPL-MCNC: 8.6 MG/DL (ref 8.3–10.1)
CALCIUM SERPL-MCNC: 8.8 MG/DL (ref 8.3–10.1)
CHLORIDE SERPL-SCNC: 105 MMOL/L (ref 100–108)
CHLORIDE SERPL-SCNC: 106 MMOL/L (ref 100–108)
CO2 SERPL-SCNC: 30 MMOL/L (ref 21–32)
CO2 SERPL-SCNC: 30 MMOL/L (ref 21–32)
CREAT SERPL-MCNC: 1.36 MG/DL (ref 0.6–1.3)
CREAT SERPL-MCNC: 1.45 MG/DL (ref 0.6–1.3)
DOP CALC AO PEAK VEL: 2.8 M/S
DOP CALC AO VTI: 59.72 CM
DOP CALC LVOT AREA: 3.46 CM2
DOP CALC LVOT DIAMETER: 2.1 CM
DOP CALC LVOT PEAK VEL VTI: 24.6 CM
DOP CALC LVOT PEAK VEL: 1.01 M/S
DOP CALC LVOT STROKE INDEX: 30.7 ML/M2
DOP CALC LVOT STROKE VOLUME: 85.16 CM3
E WAVE DECELERATION TIME: 114 MS
ERYTHROCYTE [DISTWIDTH] IN BLOOD BY AUTOMATED COUNT: 19.3 % (ref 11.6–15.1)
FERRITIN SERPL-MCNC: 37 NG/ML (ref 8–388)
FRACTIONAL SHORTENING: 28 % (ref 28–44)
GFR SERPL CREATININE-BSD FRML MDRD: 51 ML/MIN/1.73SQ M
GFR SERPL CREATININE-BSD FRML MDRD: 55 ML/MIN/1.73SQ M
GLUCOSE SERPL-MCNC: 104 MG/DL (ref 65–140)
GLUCOSE SERPL-MCNC: 83 MG/DL (ref 65–140)
GLUCOSE SERPL-MCNC: 89 MG/DL (ref 65–140)
GLUCOSE SERPL-MCNC: 90 MG/DL (ref 65–140)
GLUCOSE SERPL-MCNC: 92 MG/DL (ref 65–140)
HCT VFR BLD AUTO: 23.8 % (ref 36.5–49.3)
HCT VFR BLD AUTO: 32.7 % (ref 36.5–49.3)
HGB BLD-MCNC: 6.8 G/DL (ref 12–17)
HGB BLD-MCNC: 9.3 G/DL (ref 12–17)
INTERVENTRICULAR SEPTUM IN DIASTOLE (PARASTERNAL SHORT AXIS VIEW): 1.2 CM
IRON SATN MFR SERPL: 9 % (ref 20–50)
IRON SERPL-MCNC: 25 UG/DL (ref 65–175)
LEFT INTERNAL DIMENSION IN SYSTOLE: 4.1 CM (ref 2.1–4)
LEFT VENTRICULAR INTERNAL DIMENSION IN DIASTOLE: 5.7 CM (ref 26.05–38.85)
LEFT VENTRICULAR POSTERIOR WALL IN END DIASTOLE: 1.2 CM
LEFT VENTRICULAR STROKE VOLUME: 84 ML
LV EF: 48 %
MAGNESIUM SERPL-MCNC: 2.9 MG/DL (ref 1.6–2.6)
MCH RBC QN AUTO: 22.2 PG (ref 26.8–34.3)
MCHC RBC AUTO-ENTMCNC: 27.7 G/DL (ref 31.4–37.4)
MCV RBC AUTO: 80 FL (ref 82–98)
MRSA NOSE QL CULT: ABNORMAL
MRSA NOSE QL CULT: ABNORMAL
MV E'TISSUE VEL-SEP: 7 CM/S
MV PEAK A VEL: 0.88 M/S
MV PEAK E VEL: 76 CM/S
MV STENOSIS PRESSURE HALF TIME: 0 MS
P AXIS: 154 DEGREES
P AXIS: 28 DEGREES
P AXIS: 37 DEGREES
P AXIS: 90 DEGREES
PHOSPHATE SERPL-MCNC: <0.5 MG/DL (ref 2.3–4.1)
PLATELET # BLD AUTO: 129 THOUSANDS/UL (ref 149–390)
PMV BLD AUTO: 12.7 FL (ref 8.9–12.7)
POTASSIUM SERPL-SCNC: 3.3 MMOL/L (ref 3.5–5.3)
POTASSIUM SERPL-SCNC: 3.5 MMOL/L (ref 3.5–5.3)
PR INTERVAL: 188 MS
PR INTERVAL: 200 MS
PR INTERVAL: 208 MS
PR INTERVAL: 242 MS
PROT SERPL-MCNC: 6.5 G/DL (ref 6.4–8.2)
QRS AXIS: -4 DEGREES
QRS AXIS: 177 DEGREES
QRS AXIS: 202 DEGREES
QRS AXIS: 4 DEGREES
QRSD INTERVAL: 138 MS
QRSD INTERVAL: 150 MS
QRSD INTERVAL: 150 MS
QRSD INTERVAL: 154 MS
QT INTERVAL: 329 MS
QT INTERVAL: 404 MS
QT INTERVAL: 467 MS
QT INTERVAL: 467 MS
QTC INTERVAL: 500 MS
QTC INTERVAL: 519 MS
QTC INTERVAL: 525 MS
QTC INTERVAL: 543 MS
RBC # BLD AUTO: 2.97 MILLION/UL (ref 3.88–5.62)
RH BLD: POSITIVE
RIGHT VENTRICLE ID DIMENSION: 4.3 CM
RV PSP: 45 MMHG
SL CV ECHO AV MEAN VELOCITY RETROGRADE: 2.1 M/S
SL CV LV EF: 50
SL CV PED ECHO LEFT VENTRICLE DIASTOLIC VOLUME (MOD BIPLANE) 2D: 158 ML
SL CV PED ECHO LEFT VENTRICLE SYSTOLIC VOLUME (MOD BIPLANE) 2D: 75 ML
SODIUM SERPL-SCNC: 142 MMOL/L (ref 136–145)
SODIUM SERPL-SCNC: 142 MMOL/L (ref 136–145)
SPECIMEN EXPIRATION DATE: NORMAL
T WAVE AXIS: 141 DEGREES
T WAVE AXIS: 148 DEGREES
T WAVE AXIS: 49 DEGREES
T WAVE AXIS: 83 DEGREES
TIBC SERPL-MCNC: 294 UG/DL (ref 250–450)
TR PEAK VELOCITY: 3.2 M/S
TRICUSPID VALVE PEAK REGURGITATION VELOCITY: 3.16 M/S
TRICUSPID VALVE S': 1 CM/S
TV PEAK GRADIENT: 40 MMHG
VENTRICULAR RATE: 153 BPM
VENTRICULAR RATE: 74 BPM
VENTRICULAR RATE: 81 BPM
VENTRICULAR RATE: 92 BPM
WBC # BLD AUTO: 9.29 THOUSAND/UL (ref 4.31–10.16)
Z-SCORE OF LEFT VENTRICULAR DIMENSION IN END SYSTOLE: -17.19

## 2021-10-27 PROCEDURE — 80053 COMPREHEN METABOLIC PANEL: CPT | Performed by: NURSE PRACTITIONER

## 2021-10-27 PROCEDURE — 93010 ELECTROCARDIOGRAM REPORT: CPT | Performed by: INTERNAL MEDICINE

## 2021-10-27 PROCEDURE — 94760 N-INVAS EAR/PLS OXIMETRY 1: CPT

## 2021-10-27 PROCEDURE — 92960 CARDIOVERSION ELECTRIC EXT: CPT | Performed by: PHYSICIAN ASSISTANT

## 2021-10-27 PROCEDURE — 5A2204Z RESTORATION OF CARDIAC RHYTHM, SINGLE: ICD-10-PCS | Performed by: INTERNAL MEDICINE

## 2021-10-27 PROCEDURE — 83540 ASSAY OF IRON: CPT

## 2021-10-27 PROCEDURE — 93005 ELECTROCARDIOGRAM TRACING: CPT

## 2021-10-27 PROCEDURE — 94003 VENT MGMT INPAT SUBQ DAY: CPT

## 2021-10-27 PROCEDURE — 82728 ASSAY OF FERRITIN: CPT

## 2021-10-27 PROCEDURE — 95720 EEG PHY/QHP EA INCR W/VEEG: CPT | Performed by: STUDENT IN AN ORGANIZED HEALTH CARE EDUCATION/TRAINING PROGRAM

## 2021-10-27 PROCEDURE — 85730 THROMBOPLASTIN TIME PARTIAL: CPT | Performed by: EMERGENCY MEDICINE

## 2021-10-27 PROCEDURE — 83550 IRON BINDING TEST: CPT

## 2021-10-27 PROCEDURE — 99223 1ST HOSP IP/OBS HIGH 75: CPT | Performed by: INTERNAL MEDICINE

## 2021-10-27 PROCEDURE — 86901 BLOOD TYPING SEROLOGIC RH(D): CPT

## 2021-10-27 PROCEDURE — 99233 SBSQ HOSP IP/OBS HIGH 50: CPT | Performed by: PSYCHIATRY & NEUROLOGY

## 2021-10-27 PROCEDURE — 83735 ASSAY OF MAGNESIUM: CPT | Performed by: NURSE PRACTITIONER

## 2021-10-27 PROCEDURE — 82948 REAGENT STRIP/BLOOD GLUCOSE: CPT

## 2021-10-27 PROCEDURE — 84100 ASSAY OF PHOSPHORUS: CPT | Performed by: NURSE PRACTITIONER

## 2021-10-27 PROCEDURE — C8929 TTE W OR WO FOL WCON,DOPPLER: HCPCS

## 2021-10-27 PROCEDURE — 85027 COMPLETE CBC AUTOMATED: CPT | Performed by: NURSE PRACTITIONER

## 2021-10-27 PROCEDURE — 85014 HEMATOCRIT: CPT

## 2021-10-27 PROCEDURE — 99291 CRITICAL CARE FIRST HOUR: CPT | Performed by: INTERNAL MEDICINE

## 2021-10-27 PROCEDURE — 93306 TTE W/DOPPLER COMPLETE: CPT | Performed by: INTERNAL MEDICINE

## 2021-10-27 PROCEDURE — 86850 RBC ANTIBODY SCREEN: CPT

## 2021-10-27 PROCEDURE — 85018 HEMOGLOBIN: CPT

## 2021-10-27 PROCEDURE — 86900 BLOOD TYPING SEROLOGIC ABO: CPT

## 2021-10-27 PROCEDURE — 80048 BASIC METABOLIC PNL TOTAL CA: CPT | Performed by: PHYSICIAN ASSISTANT

## 2021-10-27 PROCEDURE — 95712 VEEG 2-12 HR INTMT MNTR: CPT

## 2021-10-27 RX ORDER — ALBUMIN (HUMAN) 12.5 G/50ML
25 SOLUTION INTRAVENOUS ONCE
Status: COMPLETED | OUTPATIENT
Start: 2021-10-27 | End: 2021-10-27

## 2021-10-27 RX ORDER — DEXMEDETOMIDINE 100 UG/ML
.1-.7 INJECTION, SOLUTION, CONCENTRATE INTRAVENOUS
Status: DISCONTINUED | OUTPATIENT
Start: 2021-10-27 | End: 2021-10-28

## 2021-10-27 RX ORDER — METOPROLOL TARTRATE 5 MG/5ML
5 INJECTION INTRAVENOUS ONCE
Status: COMPLETED | OUTPATIENT
Start: 2021-10-27 | End: 2021-10-27

## 2021-10-27 RX ORDER — MIDAZOLAM HYDROCHLORIDE 2 MG/2ML
INJECTION, SOLUTION INTRAMUSCULAR; INTRAVENOUS
Status: COMPLETED
Start: 2021-10-27 | End: 2021-10-27

## 2021-10-27 RX ORDER — FENTANYL CITRATE 50 UG/ML
INJECTION, SOLUTION INTRAMUSCULAR; INTRAVENOUS
Status: COMPLETED
Start: 2021-10-27 | End: 2021-10-27

## 2021-10-27 RX ORDER — POTASSIUM CHLORIDE 20MEQ/15ML
20 LIQUID (ML) ORAL ONCE
Status: COMPLETED | OUTPATIENT
Start: 2021-10-27 | End: 2021-10-27

## 2021-10-27 RX ORDER — METOPROLOL TARTRATE 5 MG/5ML
5 INJECTION INTRAVENOUS ONCE
Status: DISCONTINUED | OUTPATIENT
Start: 2021-10-27 | End: 2021-10-27

## 2021-10-27 RX ADMIN — CHLORHEXIDINE GLUCONATE 15 ML: 1.2 SOLUTION ORAL at 21:51

## 2021-10-27 RX ADMIN — NOREPINEPHRINE BITARTRATE 10 MCG/MIN: 1 INJECTION, SOLUTION, CONCENTRATE INTRAVENOUS at 06:34

## 2021-10-27 RX ADMIN — Medication 20 MG: at 02:37

## 2021-10-27 RX ADMIN — ALBUMIN (HUMAN) 25 G: 0.25 INJECTION, SOLUTION INTRAVENOUS at 03:45

## 2021-10-27 RX ADMIN — POTASSIUM PHOSPHATE, MONOBASIC AND POTASSIUM PHOSPHATE, DIBASIC 30 MMOL: 224; 236 INJECTION, SOLUTION, CONCENTRATE INTRAVENOUS at 08:33

## 2021-10-27 RX ADMIN — POTASSIUM CHLORIDE 20 MEQ: 20 SOLUTION ORAL at 08:26

## 2021-10-27 RX ADMIN — POTASSIUM & SODIUM PHOSPHATES POWDER PACK 280-160-250 MG 2 PACKET: 280-160-250 PACK at 21:51

## 2021-10-27 RX ADMIN — POTASSIUM & SODIUM PHOSPHATES POWDER PACK 280-160-250 MG 2 PACKET: 280-160-250 PACK at 16:03

## 2021-10-27 RX ADMIN — POTASSIUM & SODIUM PHOSPHATES POWDER PACK 280-160-250 MG 2 PACKET: 280-160-250 PACK at 11:33

## 2021-10-27 RX ADMIN — CEFEPIME HYDROCHLORIDE 2000 MG: 2 INJECTION, POWDER, FOR SOLUTION INTRAVENOUS at 20:23

## 2021-10-27 RX ADMIN — LEVETIRACETAM 1500 MG: 100 INJECTION INTRAVENOUS at 18:22

## 2021-10-27 RX ADMIN — DIPHENHYDRAMINE HYDROCHLORIDE 25 MG: 50 INJECTION, SOLUTION INTRAMUSCULAR; INTRAVENOUS at 00:14

## 2021-10-27 RX ADMIN — ATORVASTATIN CALCIUM 40 MG: 40 TABLET, FILM COATED ORAL at 16:03

## 2021-10-27 RX ADMIN — METOPROLOL TARTRATE 5 MG: 1 INJECTION, SOLUTION INTRAVENOUS at 05:34

## 2021-10-27 RX ADMIN — LEVETIRACETAM 1500 MG: 100 INJECTION INTRAVENOUS at 06:28

## 2021-10-27 RX ADMIN — ASPIRIN 81 MG CHEWABLE TABLET 81 MG: 81 TABLET CHEWABLE at 08:02

## 2021-10-27 RX ADMIN — CEFEPIME HYDROCHLORIDE 2000 MG: 2 INJECTION, POWDER, FOR SOLUTION INTRAVENOUS at 03:34

## 2021-10-27 RX ADMIN — SODIUM CHLORIDE 0.2 MCG/KG/HR: 9 INJECTION, SOLUTION INTRAVENOUS at 11:40

## 2021-10-27 RX ADMIN — METOPROLOL TARTRATE 5 MG: 1 INJECTION, SOLUTION INTRAVENOUS at 08:02

## 2021-10-27 RX ADMIN — HEPARIN SODIUM 15 UNITS/KG/HR: 10000 INJECTION, SOLUTION INTRAVENOUS at 07:30

## 2021-10-27 RX ADMIN — NOREPINEPHRINE BITARTRATE 2 MCG/MIN: 1 INJECTION, SOLUTION, CONCENTRATE INTRAVENOUS at 22:03

## 2021-10-27 RX ADMIN — HEPARIN SODIUM 4000 UNITS: 1000 INJECTION INTRAVENOUS; SUBCUTANEOUS at 02:15

## 2021-10-27 RX ADMIN — MIDAZOLAM 2 MG: 1 INJECTION INTRAMUSCULAR; INTRAVENOUS at 05:51

## 2021-10-27 RX ADMIN — CEFEPIME HYDROCHLORIDE 2000 MG: 2 INJECTION, POWDER, FOR SOLUTION INTRAVENOUS at 11:53

## 2021-10-27 RX ADMIN — SODIUM CHLORIDE 0.2 MCG/KG/HR: 9 INJECTION, SOLUTION INTRAVENOUS at 02:50

## 2021-10-27 RX ADMIN — CHLORHEXIDINE GLUCONATE 15 ML: 1.2 SOLUTION ORAL at 08:02

## 2021-10-27 RX ADMIN — PERFLUTREN 0.6 ML/MIN: 6.52 INJECTION, SUSPENSION INTRAVENOUS at 15:15

## 2021-10-27 RX ADMIN — Medication 20 MG: at 12:49

## 2021-10-27 RX ADMIN — CHLORHEXIDINE GLUCONATE 15 ML: 1.2 SOLUTION ORAL at 00:13

## 2021-10-27 RX ADMIN — LORAZEPAM 2 MG: 2 INJECTION INTRAMUSCULAR; INTRAVENOUS at 05:49

## 2021-10-27 RX ADMIN — POTASSIUM & SODIUM PHOSPHATES POWDER PACK 280-160-250 MG 2 PACKET: 280-160-250 PACK at 08:26

## 2021-10-28 LAB
ANION GAP SERPL CALCULATED.3IONS-SCNC: 5 MMOL/L (ref 4–13)
APTT PPP: 74 SECONDS (ref 23–37)
BASE EXCESS BLDA CALC-SCNC: 4.6 MMOL/L
BUN SERPL-MCNC: 20 MG/DL (ref 5–25)
CA-I BLD-SCNC: 1.04 MMOL/L (ref 1.12–1.32)
CALCIUM SERPL-MCNC: 8 MG/DL (ref 8.3–10.1)
CHLORIDE SERPL-SCNC: 107 MMOL/L (ref 100–108)
CO2 SERPL-SCNC: 30 MMOL/L (ref 21–32)
CREAT SERPL-MCNC: 1.24 MG/DL (ref 0.6–1.3)
ERYTHROCYTE [DISTWIDTH] IN BLOOD BY AUTOMATED COUNT: 19.7 % (ref 11.6–15.1)
GFR SERPL CREATININE-BSD FRML MDRD: 62 ML/MIN/1.73SQ M
GLUCOSE SERPL-MCNC: 100 MG/DL (ref 65–140)
GLUCOSE SERPL-MCNC: 93 MG/DL (ref 65–140)
GLUCOSE SERPL-MCNC: 94 MG/DL (ref 65–140)
HCO3 BLDA-SCNC: 28.5 MMOL/L (ref 22–28)
HCT VFR BLD AUTO: 32.2 % (ref 36.5–49.3)
HGB BLD-MCNC: 9.3 G/DL (ref 12–17)
HOROWITZ INDEX BLDA+IHG-RTO: 40 MM[HG]
MAGNESIUM SERPL-MCNC: 2.4 MG/DL (ref 1.6–2.6)
MCH RBC QN AUTO: 22.1 PG (ref 26.8–34.3)
MCHC RBC AUTO-ENTMCNC: 28.9 G/DL (ref 31.4–37.4)
MCV RBC AUTO: 77 FL (ref 82–98)
O2 CT BLDA-SCNC: 13.6 ML/DL (ref 16–23)
OXYHGB MFR BLDA: 97.8 % (ref 94–97)
PCO2 BLDA: 39.6 MM HG (ref 36–44)
PEEP RESPIRATORY: 6 CM[H2O]
PH BLDA: 7.47 [PH] (ref 7.35–7.45)
PHOSPHATE SERPL-MCNC: 2.7 MG/DL (ref 2.3–4.1)
PLATELET # BLD AUTO: 174 THOUSANDS/UL (ref 149–390)
PO2 BLDA: 126.7 MM HG (ref 75–129)
POTASSIUM SERPL-SCNC: 3.2 MMOL/L (ref 3.5–5.3)
RBC # BLD AUTO: 4.2 MILLION/UL (ref 3.88–5.62)
SODIUM SERPL-SCNC: 142 MMOL/L (ref 136–145)
SPECIMEN SOURCE: ABNORMAL
VENT AC: 15
VENT- AC: AC
VT SETTING VENT: 500 ML
WBC # BLD AUTO: 8.9 THOUSAND/UL (ref 4.31–10.16)

## 2021-10-28 PROCEDURE — 82805 BLOOD GASES W/O2 SATURATION: CPT | Performed by: PHYSICIAN ASSISTANT

## 2021-10-28 PROCEDURE — 82330 ASSAY OF CALCIUM: CPT | Performed by: PHYSICIAN ASSISTANT

## 2021-10-28 PROCEDURE — 82948 REAGENT STRIP/BLOOD GLUCOSE: CPT

## 2021-10-28 PROCEDURE — 80048 BASIC METABOLIC PNL TOTAL CA: CPT | Performed by: PHYSICIAN ASSISTANT

## 2021-10-28 PROCEDURE — 85730 THROMBOPLASTIN TIME PARTIAL: CPT | Performed by: PHYSICIAN ASSISTANT

## 2021-10-28 PROCEDURE — 95718 EEG PHYS/QHP 2-12 HR W/VEEG: CPT | Performed by: STUDENT IN AN ORGANIZED HEALTH CARE EDUCATION/TRAINING PROGRAM

## 2021-10-28 PROCEDURE — 94760 N-INVAS EAR/PLS OXIMETRY 1: CPT

## 2021-10-28 PROCEDURE — 84100 ASSAY OF PHOSPHORUS: CPT | Performed by: PHYSICIAN ASSISTANT

## 2021-10-28 PROCEDURE — 83735 ASSAY OF MAGNESIUM: CPT | Performed by: PHYSICIAN ASSISTANT

## 2021-10-28 PROCEDURE — 99233 SBSQ HOSP IP/OBS HIGH 50: CPT | Performed by: INTERNAL MEDICINE

## 2021-10-28 PROCEDURE — 94003 VENT MGMT INPAT SUBQ DAY: CPT

## 2021-10-28 PROCEDURE — 99291 CRITICAL CARE FIRST HOUR: CPT | Performed by: INTERNAL MEDICINE

## 2021-10-28 PROCEDURE — 85027 COMPLETE CBC AUTOMATED: CPT | Performed by: PHYSICIAN ASSISTANT

## 2021-10-28 RX ORDER — DIPHENHYDRAMINE HYDROCHLORIDE 50 MG/ML
25 INJECTION INTRAMUSCULAR; INTRAVENOUS ONCE
Status: COMPLETED | OUTPATIENT
Start: 2021-10-28 | End: 2021-10-28

## 2021-10-28 RX ORDER — CALCIUM GLUCONATE 20 MG/ML
1 INJECTION, SOLUTION INTRAVENOUS ONCE
Status: COMPLETED | OUTPATIENT
Start: 2021-10-28 | End: 2021-10-28

## 2021-10-28 RX ORDER — DEXMEDETOMIDINE 100 UG/ML
.1-.7 INJECTION, SOLUTION, CONCENTRATE INTRAVENOUS
Status: DISCONTINUED | OUTPATIENT
Start: 2021-10-28 | End: 2021-10-29

## 2021-10-28 RX ORDER — CALCIUM ACETATE 667 MG/1
1334 CAPSULE ORAL ONCE
Status: DISCONTINUED | OUTPATIENT
Start: 2021-10-28 | End: 2021-10-28

## 2021-10-28 RX ORDER — ALBUMIN (HUMAN) 12.5 G/50ML
12.5 SOLUTION INTRAVENOUS ONCE
Status: COMPLETED | OUTPATIENT
Start: 2021-10-28 | End: 2021-10-28

## 2021-10-28 RX ORDER — FUROSEMIDE 10 MG/ML
40 INJECTION INTRAMUSCULAR; INTRAVENOUS ONCE
Status: DISCONTINUED | OUTPATIENT
Start: 2021-10-28 | End: 2021-10-28

## 2021-10-28 RX ORDER — CALCIUM CHLORIDE 100 MG/ML
1 INJECTION INTRAVENOUS; INTRAVENTRICULAR ONCE
Status: DISCONTINUED | OUTPATIENT
Start: 2021-10-28 | End: 2021-10-28

## 2021-10-28 RX ORDER — LEVETIRACETAM 100 MG/ML
1500 SOLUTION ORAL EVERY 12 HOURS SCHEDULED
Status: DISCONTINUED | OUTPATIENT
Start: 2021-10-28 | End: 2021-10-30

## 2021-10-28 RX ORDER — FUROSEMIDE 10 MG/ML
40 INJECTION INTRAMUSCULAR; INTRAVENOUS ONCE
Status: COMPLETED | OUTPATIENT
Start: 2021-10-28 | End: 2021-10-28

## 2021-10-28 RX ORDER — DIPHENHYDRAMINE HYDROCHLORIDE 50 MG/ML
25 INJECTION INTRAMUSCULAR; INTRAVENOUS EVERY 6 HOURS PRN
Status: DISCONTINUED | OUTPATIENT
Start: 2021-10-28 | End: 2021-10-29

## 2021-10-28 RX ORDER — POTASSIUM CHLORIDE 20MEQ/15ML
20 LIQUID (ML) ORAL ONCE
Status: COMPLETED | OUTPATIENT
Start: 2021-10-28 | End: 2021-10-28

## 2021-10-28 RX ADMIN — CEFEPIME HYDROCHLORIDE 2000 MG: 2 INJECTION, POWDER, FOR SOLUTION INTRAVENOUS at 20:34

## 2021-10-28 RX ADMIN — ASPIRIN 81 MG CHEWABLE TABLET 81 MG: 81 TABLET CHEWABLE at 09:03

## 2021-10-28 RX ADMIN — SODIUM CHLORIDE 0.2 MCG/KG/HR: 9 INJECTION, SOLUTION INTRAVENOUS at 21:41

## 2021-10-28 RX ADMIN — CHLORHEXIDINE GLUCONATE 15 ML: 1.2 SOLUTION ORAL at 09:03

## 2021-10-28 RX ADMIN — POTASSIUM CHLORIDE 20 MEQ: 20 SOLUTION ORAL at 06:57

## 2021-10-28 RX ADMIN — LEVETIRACETAM 1500 MG: 100 INJECTION INTRAVENOUS at 06:01

## 2021-10-28 RX ADMIN — POTASSIUM & SODIUM PHOSPHATES POWDER PACK 280-160-250 MG 2 PACKET: 280-160-250 PACK at 16:29

## 2021-10-28 RX ADMIN — CEFEPIME HYDROCHLORIDE 2000 MG: 2 INJECTION, POWDER, FOR SOLUTION INTRAVENOUS at 12:10

## 2021-10-28 RX ADMIN — ALBUMIN (HUMAN) 12.5 G: 0.25 INJECTION, SOLUTION INTRAVENOUS at 06:57

## 2021-10-28 RX ADMIN — CEFEPIME HYDROCHLORIDE 2000 MG: 2 INJECTION, POWDER, FOR SOLUTION INTRAVENOUS at 03:53

## 2021-10-28 RX ADMIN — DIPHENHYDRAMINE HYDROCHLORIDE 25 MG: 50 INJECTION, SOLUTION INTRAMUSCULAR; INTRAVENOUS at 18:32

## 2021-10-28 RX ADMIN — SODIUM CHLORIDE 0.2 MCG/KG/HR: 9 INJECTION, SOLUTION INTRAVENOUS at 04:42

## 2021-10-28 RX ADMIN — POTASSIUM & SODIUM PHOSPHATES POWDER PACK 280-160-250 MG 2 PACKET: 280-160-250 PACK at 21:55

## 2021-10-28 RX ADMIN — HEPARIN SODIUM 15 UNITS/KG/HR: 10000 INJECTION, SOLUTION INTRAVENOUS at 21:38

## 2021-10-28 RX ADMIN — CALCIUM GLUCONATE 1 G: 20 INJECTION, SOLUTION INTRAVENOUS at 06:56

## 2021-10-28 RX ADMIN — FUROSEMIDE 40 MG: 10 INJECTION, SOLUTION INTRAMUSCULAR; INTRAVENOUS at 10:12

## 2021-10-28 RX ADMIN — POTASSIUM & SODIUM PHOSPHATES POWDER PACK 280-160-250 MG 2 PACKET: 280-160-250 PACK at 06:56

## 2021-10-28 RX ADMIN — HEPARIN SODIUM 15 UNITS/KG/HR: 10000 INJECTION, SOLUTION INTRAVENOUS at 02:53

## 2021-10-28 RX ADMIN — METOPROLOL TARTRATE 25 MG: 25 TABLET, FILM COATED ORAL at 09:03

## 2021-10-28 RX ADMIN — METOPROLOL TARTRATE 25 MG: 25 TABLET, FILM COATED ORAL at 21:55

## 2021-10-28 RX ADMIN — DIPHENHYDRAMINE HYDROCHLORIDE 25 MG: 50 INJECTION, SOLUTION INTRAMUSCULAR; INTRAVENOUS at 15:03

## 2021-10-28 RX ADMIN — Medication 20 MG: at 12:52

## 2021-10-28 RX ADMIN — Medication 20 MG: at 03:53

## 2021-10-28 RX ADMIN — POTASSIUM & SODIUM PHOSPHATES POWDER PACK 280-160-250 MG 2 PACKET: 280-160-250 PACK at 12:10

## 2021-10-28 RX ADMIN — ATORVASTATIN CALCIUM 40 MG: 40 TABLET, FILM COATED ORAL at 16:29

## 2021-10-28 RX ADMIN — LEVETIRACETAM 1500 MG: 100 SOLUTION ORAL at 21:54

## 2021-10-29 PROBLEM — I47.2 WIDE-COMPLEX TACHYCARDIA (HCC): Status: ACTIVE | Noted: 2021-10-29

## 2021-10-29 LAB
ANION GAP SERPL CALCULATED.3IONS-SCNC: 3 MMOL/L (ref 4–13)
APTT PPP: 56 SECONDS (ref 23–37)
APTT PPP: 83 SECONDS (ref 23–37)
APTT PPP: 91 SECONDS (ref 23–37)
BASE EXCESS BLDA CALC-SCNC: 6.3 MMOL/L
BUN SERPL-MCNC: 21 MG/DL (ref 5–25)
CALCIUM SERPL-MCNC: 8.2 MG/DL (ref 8.3–10.1)
CHLORIDE SERPL-SCNC: 108 MMOL/L (ref 100–108)
CO2 SERPL-SCNC: 32 MMOL/L (ref 21–32)
CREAT SERPL-MCNC: 1.2 MG/DL (ref 0.6–1.3)
ERYTHROCYTE [DISTWIDTH] IN BLOOD BY AUTOMATED COUNT: 19.9 % (ref 11.6–15.1)
GFR SERPL CREATININE-BSD FRML MDRD: 64 ML/MIN/1.73SQ M
GLUCOSE SERPL-MCNC: 110 MG/DL (ref 65–140)
GLUCOSE SERPL-MCNC: 131 MG/DL (ref 65–140)
GLUCOSE SERPL-MCNC: 137 MG/DL (ref 65–140)
HCO3 BLDA-SCNC: 32.7 MMOL/L (ref 22–28)
HCT VFR BLD AUTO: 33.4 % (ref 36.5–49.3)
HGB BLD-MCNC: 9.4 G/DL (ref 12–17)
MAGNESIUM SERPL-MCNC: 2.3 MG/DL (ref 1.6–2.6)
MCH RBC QN AUTO: 21.9 PG (ref 26.8–34.3)
MCHC RBC AUTO-ENTMCNC: 28.1 G/DL (ref 31.4–37.4)
MCV RBC AUTO: 78 FL (ref 82–98)
O2 CT BLDA-SCNC: 14.7 ML/DL (ref 16–23)
OXYHGB MFR BLDA: 98 % (ref 94–97)
PCO2 BLDA: 56.5 MM HG (ref 36–44)
PH BLDA: 7.38 [PH] (ref 7.35–7.45)
PHOSPHATE SERPL-MCNC: 5.2 MG/DL (ref 2.3–4.1)
PLATELET # BLD AUTO: 132 THOUSANDS/UL (ref 149–390)
PO2 BLDA: 139.8 MM HG (ref 75–129)
POTASSIUM SERPL-SCNC: 3.1 MMOL/L (ref 3.5–5.3)
PROCALCITONIN SERPL-MCNC: 0.13 NG/ML
RBC # BLD AUTO: 4.3 MILLION/UL (ref 3.88–5.62)
SODIUM SERPL-SCNC: 143 MMOL/L (ref 136–145)
SPECIMEN SOURCE: ABNORMAL
WBC # BLD AUTO: 11.49 THOUSAND/UL (ref 4.31–10.16)

## 2021-10-29 PROCEDURE — 85730 THROMBOPLASTIN TIME PARTIAL: CPT | Performed by: EMERGENCY MEDICINE

## 2021-10-29 PROCEDURE — 94003 VENT MGMT INPAT SUBQ DAY: CPT

## 2021-10-29 PROCEDURE — 85730 THROMBOPLASTIN TIME PARTIAL: CPT | Performed by: INTERNAL MEDICINE

## 2021-10-29 PROCEDURE — 84145 PROCALCITONIN (PCT): CPT | Performed by: PHYSICIAN ASSISTANT

## 2021-10-29 PROCEDURE — 82805 BLOOD GASES W/O2 SATURATION: CPT | Performed by: EMERGENCY MEDICINE

## 2021-10-29 PROCEDURE — 82948 REAGENT STRIP/BLOOD GLUCOSE: CPT

## 2021-10-29 PROCEDURE — 93005 ELECTROCARDIOGRAM TRACING: CPT

## 2021-10-29 PROCEDURE — 99231 SBSQ HOSP IP/OBS SF/LOW 25: CPT | Performed by: INTERNAL MEDICINE

## 2021-10-29 PROCEDURE — 99291 CRITICAL CARE FIRST HOUR: CPT | Performed by: INTERNAL MEDICINE

## 2021-10-29 PROCEDURE — 80048 BASIC METABOLIC PNL TOTAL CA: CPT | Performed by: PHYSICIAN ASSISTANT

## 2021-10-29 PROCEDURE — 84100 ASSAY OF PHOSPHORUS: CPT

## 2021-10-29 PROCEDURE — 83735 ASSAY OF MAGNESIUM: CPT

## 2021-10-29 PROCEDURE — 85027 COMPLETE CBC AUTOMATED: CPT | Performed by: PHYSICIAN ASSISTANT

## 2021-10-29 PROCEDURE — 94760 N-INVAS EAR/PLS OXIMETRY 1: CPT

## 2021-10-29 RX ORDER — LIDOCAINE 50 MG/G
2 PATCH TOPICAL DAILY
Status: DISCONTINUED | OUTPATIENT
Start: 2021-10-29 | End: 2021-11-09 | Stop reason: HOSPADM

## 2021-10-29 RX ORDER — CETIRIZINE HYDROCHLORIDE 10 MG/1
10 TABLET ORAL DAILY
Status: DISCONTINUED | OUTPATIENT
Start: 2021-10-29 | End: 2021-10-29

## 2021-10-29 RX ORDER — METOPROLOL TARTRATE 5 MG/5ML
5 INJECTION INTRAVENOUS ONCE
Status: COMPLETED | OUTPATIENT
Start: 2021-10-29 | End: 2021-10-29

## 2021-10-29 RX ORDER — POTASSIUM CHLORIDE 20MEQ/15ML
40 LIQUID (ML) ORAL ONCE
Status: COMPLETED | OUTPATIENT
Start: 2021-10-29 | End: 2021-10-29

## 2021-10-29 RX ORDER — IODINE/SODIUM IODIDE 2 %
TINCTURE TOPICAL 4 TIMES DAILY
Status: DISCONTINUED | OUTPATIENT
Start: 2021-10-29 | End: 2021-10-31

## 2021-10-29 RX ORDER — LORATADINE 10 MG/1
10 TABLET ORAL DAILY
Status: DISCONTINUED | OUTPATIENT
Start: 2021-10-29 | End: 2021-10-31

## 2021-10-29 RX ORDER — METHYLPREDNISOLONE 4 MG/1
8 TABLET ORAL DAILY
Status: COMPLETED | OUTPATIENT
Start: 2021-11-02 | End: 2021-11-02

## 2021-10-29 RX ORDER — METHYLPREDNISOLONE 4 MG/1
12 TABLET ORAL DAILY
Status: COMPLETED | OUTPATIENT
Start: 2021-11-01 | End: 2021-11-01

## 2021-10-29 RX ORDER — METHYLPREDNISOLONE 4 MG/1
4 TABLET ORAL DAILY
Status: COMPLETED | OUTPATIENT
Start: 2021-11-03 | End: 2021-11-03

## 2021-10-29 RX ORDER — METHYLPREDNISOLONE 16 MG/1
16 TABLET ORAL DAILY
Status: COMPLETED | OUTPATIENT
Start: 2021-10-31 | End: 2021-10-31

## 2021-10-29 RX ORDER — ONDANSETRON 2 MG/ML
4 INJECTION INTRAMUSCULAR; INTRAVENOUS ONCE
Status: COMPLETED | OUTPATIENT
Start: 2021-10-29 | End: 2021-10-29

## 2021-10-29 RX ORDER — METOPROLOL TARTRATE 50 MG/1
50 TABLET, FILM COATED ORAL EVERY 12 HOURS SCHEDULED
Status: DISCONTINUED | OUTPATIENT
Start: 2021-10-29 | End: 2021-10-30

## 2021-10-29 RX ADMIN — FERRIC OXIDE RED: 8; 8 LOTION TOPICAL at 21:12

## 2021-10-29 RX ADMIN — LEVETIRACETAM 1500 MG: 100 SOLUTION ORAL at 21:12

## 2021-10-29 RX ADMIN — Medication 20 MG: at 03:14

## 2021-10-29 RX ADMIN — HEPARIN SODIUM 15 UNITS/KG/HR: 10000 INJECTION, SOLUTION INTRAVENOUS at 16:35

## 2021-10-29 RX ADMIN — Medication 12.5 MG: at 17:18

## 2021-10-29 RX ADMIN — METOROPROLOL TARTRATE 5 MG: 5 INJECTION, SOLUTION INTRAVENOUS at 21:06

## 2021-10-29 RX ADMIN — HEPARIN SODIUM 2000 UNITS: 1000 INJECTION INTRAVENOUS; SUBCUTANEOUS at 15:05

## 2021-10-29 RX ADMIN — ATORVASTATIN CALCIUM 40 MG: 40 TABLET, FILM COATED ORAL at 17:19

## 2021-10-29 RX ADMIN — FERRIC OXIDE RED: 8; 8 LOTION TOPICAL at 18:00

## 2021-10-29 RX ADMIN — ONDANSETRON 4 MG: 2 INJECTION INTRAMUSCULAR; INTRAVENOUS at 21:10

## 2021-10-29 RX ADMIN — ASPIRIN 81 MG CHEWABLE TABLET 81 MG: 81 TABLET CHEWABLE at 09:24

## 2021-10-29 RX ADMIN — METOPROLOL TARTRATE 50 MG: 50 TABLET, FILM COATED ORAL at 21:12

## 2021-10-29 RX ADMIN — POTASSIUM CHLORIDE 40 MEQ: 20 SOLUTION ORAL at 09:29

## 2021-10-29 RX ADMIN — DILTIAZEM HYDROCHLORIDE 2.5 MG/HR: 5 INJECTION INTRAVENOUS at 22:25

## 2021-10-29 RX ADMIN — CEFEPIME HYDROCHLORIDE 2000 MG: 2 INJECTION, POWDER, FOR SOLUTION INTRAVENOUS at 03:15

## 2021-10-29 RX ADMIN — POTASSIUM & SODIUM PHOSPHATES POWDER PACK 280-160-250 MG 2 PACKET: 280-160-250 PACK at 07:41

## 2021-10-29 RX ADMIN — FERRIC OXIDE RED: 8; 8 LOTION TOPICAL at 10:49

## 2021-10-29 RX ADMIN — Medication 20 MG: at 13:19

## 2021-10-29 RX ADMIN — LIDOCAINE 5% 2 PATCH: 700 PATCH TOPICAL at 15:30

## 2021-10-29 RX ADMIN — FERRIC OXIDE RED: 8; 8 LOTION TOPICAL at 12:27

## 2021-10-29 RX ADMIN — POTASSIUM CHLORIDE 40 MEQ: 20 SOLUTION ORAL at 07:41

## 2021-10-29 RX ADMIN — LEVETIRACETAM 1500 MG: 100 SOLUTION ORAL at 09:24

## 2021-10-29 RX ADMIN — METOROPROLOL TARTRATE 5 MG: 5 INJECTION, SOLUTION INTRAVENOUS at 20:48

## 2021-10-29 RX ADMIN — METHYLPREDNISOLONE 24 MG: 16 TABLET ORAL at 14:16

## 2021-10-29 RX ADMIN — PHENYLEPHRINE HYDROCHLORIDE 25 MCG/MIN: 10 INJECTION INTRAVENOUS at 22:11

## 2021-10-29 RX ADMIN — NOREPINEPHRINE BITARTRATE 3 MCG/MIN: 1 INJECTION, SOLUTION, CONCENTRATE INTRAVENOUS at 04:07

## 2021-10-29 RX ADMIN — LORATADINE 10 MG: 10 TABLET ORAL at 10:17

## 2021-10-29 RX ADMIN — SODIUM CHLORIDE 0.4 MCG/KG/HR: 9 INJECTION, SOLUTION INTRAVENOUS at 04:06

## 2021-10-30 LAB
ANION GAP SERPL CALCULATED.3IONS-SCNC: 3 MMOL/L (ref 4–13)
APTT PPP: 88 SECONDS (ref 23–37)
BACTERIA BLD CULT: NORMAL
BACTERIA BLD CULT: NORMAL
BUN SERPL-MCNC: 19 MG/DL (ref 5–25)
CALCIUM SERPL-MCNC: 8.1 MG/DL (ref 8.3–10.1)
CHLORIDE SERPL-SCNC: 107 MMOL/L (ref 100–108)
CO2 SERPL-SCNC: 33 MMOL/L (ref 21–32)
CREAT SERPL-MCNC: 1.16 MG/DL (ref 0.6–1.3)
ERYTHROCYTE [DISTWIDTH] IN BLOOD BY AUTOMATED COUNT: 20 % (ref 11.6–15.1)
GFR SERPL CREATININE-BSD FRML MDRD: 67 ML/MIN/1.73SQ M
GLUCOSE SERPL-MCNC: 108 MG/DL (ref 65–140)
GLUCOSE SERPL-MCNC: 124 MG/DL (ref 65–140)
GLUCOSE SERPL-MCNC: 85 MG/DL (ref 65–140)
GLUCOSE SERPL-MCNC: 90 MG/DL (ref 65–140)
HCT VFR BLD AUTO: 34.5 % (ref 36.5–49.3)
HGB BLD-MCNC: 9.4 G/DL (ref 12–17)
MAGNESIUM SERPL-MCNC: 2.4 MG/DL (ref 1.6–2.6)
MCH RBC QN AUTO: 22.2 PG (ref 26.8–34.3)
MCHC RBC AUTO-ENTMCNC: 27.2 G/DL (ref 31.4–37.4)
MCV RBC AUTO: 82 FL (ref 82–98)
PHOSPHATE SERPL-MCNC: 5.1 MG/DL (ref 2.3–4.1)
PLATELET # BLD AUTO: 155 THOUSANDS/UL (ref 149–390)
POTASSIUM SERPL-SCNC: 3.8 MMOL/L (ref 3.5–5.3)
PROCALCITONIN SERPL-MCNC: 0.12 NG/ML
RBC # BLD AUTO: 4.23 MILLION/UL (ref 3.88–5.62)
SODIUM SERPL-SCNC: 143 MMOL/L (ref 136–145)
WBC # BLD AUTO: 15.03 THOUSAND/UL (ref 4.31–10.16)

## 2021-10-30 PROCEDURE — 94660 CPAP INITIATION&MGMT: CPT

## 2021-10-30 PROCEDURE — NC001 PR NO CHARGE: Performed by: INTERNAL MEDICINE

## 2021-10-30 PROCEDURE — 82948 REAGENT STRIP/BLOOD GLUCOSE: CPT

## 2021-10-30 PROCEDURE — 84100 ASSAY OF PHOSPHORUS: CPT

## 2021-10-30 PROCEDURE — 93005 ELECTROCARDIOGRAM TRACING: CPT

## 2021-10-30 PROCEDURE — 85027 COMPLETE CBC AUTOMATED: CPT

## 2021-10-30 PROCEDURE — 94760 N-INVAS EAR/PLS OXIMETRY 1: CPT

## 2021-10-30 PROCEDURE — 84145 PROCALCITONIN (PCT): CPT | Performed by: PHYSICIAN ASSISTANT

## 2021-10-30 PROCEDURE — 83735 ASSAY OF MAGNESIUM: CPT

## 2021-10-30 PROCEDURE — 85730 THROMBOPLASTIN TIME PARTIAL: CPT | Performed by: INTERNAL MEDICINE

## 2021-10-30 PROCEDURE — 99291 CRITICAL CARE FIRST HOUR: CPT | Performed by: INTERNAL MEDICINE

## 2021-10-30 PROCEDURE — 99232 SBSQ HOSP IP/OBS MODERATE 35: CPT | Performed by: INTERNAL MEDICINE

## 2021-10-30 PROCEDURE — 80048 BASIC METABOLIC PNL TOTAL CA: CPT

## 2021-10-30 RX ORDER — PANTOPRAZOLE SODIUM 40 MG/1
40 TABLET, DELAYED RELEASE ORAL
Status: DISCONTINUED | OUTPATIENT
Start: 2021-10-30 | End: 2021-11-09 | Stop reason: HOSPADM

## 2021-10-30 RX ORDER — METOPROLOL TARTRATE 5 MG/5ML
2.5 INJECTION INTRAVENOUS ONCE
Status: COMPLETED | OUTPATIENT
Start: 2021-10-30 | End: 2021-10-30

## 2021-10-30 RX ORDER — METOPROLOL TARTRATE 50 MG/1
50 TABLET, FILM COATED ORAL 3 TIMES DAILY
Status: DISCONTINUED | OUTPATIENT
Start: 2021-10-30 | End: 2021-11-02

## 2021-10-30 RX ORDER — POTASSIUM CHLORIDE 20MEQ/15ML
20 LIQUID (ML) ORAL ONCE
Status: COMPLETED | OUTPATIENT
Start: 2021-10-30 | End: 2021-10-30

## 2021-10-30 RX ORDER — LEVETIRACETAM 750 MG/1
1500 TABLET ORAL EVERY 12 HOURS SCHEDULED
Status: DISCONTINUED | OUTPATIENT
Start: 2021-10-30 | End: 2021-11-09 | Stop reason: HOSPADM

## 2021-10-30 RX ADMIN — FERRIC OXIDE RED: 8; 8 LOTION TOPICAL at 12:00

## 2021-10-30 RX ADMIN — FERRIC OXIDE RED: 8; 8 LOTION TOPICAL at 21:53

## 2021-10-30 RX ADMIN — POTASSIUM CHLORIDE 20 MEQ: 20 SOLUTION ORAL at 06:42

## 2021-10-30 RX ADMIN — METOROPROLOL TARTRATE 2.5 MG: 5 INJECTION, SOLUTION INTRAVENOUS at 18:05

## 2021-10-30 RX ADMIN — LEVETIRACETAM 1500 MG: 100 SOLUTION ORAL at 09:30

## 2021-10-30 RX ADMIN — LIDOCAINE 5% 2 PATCH: 700 PATCH TOPICAL at 09:30

## 2021-10-30 RX ADMIN — METOPROLOL TARTRATE 50 MG: 50 TABLET, FILM COATED ORAL at 16:42

## 2021-10-30 RX ADMIN — ASPIRIN 81 MG CHEWABLE TABLET 81 MG: 81 TABLET CHEWABLE at 09:29

## 2021-10-30 RX ADMIN — LEVETIRACETAM 1500 MG: 750 TABLET, FILM COATED ORAL at 20:11

## 2021-10-30 RX ADMIN — DILTIAZEM HYDROCHLORIDE 30 MG: 30 TABLET, FILM COATED ORAL at 17:55

## 2021-10-30 RX ADMIN — DILTIAZEM HYDROCHLORIDE 2.5 MG/HR: 5 INJECTION INTRAVENOUS at 11:26

## 2021-10-30 RX ADMIN — METHYLPREDNISOLONE 20 MG: 16 TABLET ORAL at 09:29

## 2021-10-30 RX ADMIN — ATORVASTATIN CALCIUM 40 MG: 40 TABLET, FILM COATED ORAL at 16:43

## 2021-10-30 RX ADMIN — FERRIC OXIDE RED: 8; 8 LOTION TOPICAL at 18:05

## 2021-10-30 RX ADMIN — LORATADINE 10 MG: 10 TABLET ORAL at 09:29

## 2021-10-30 RX ADMIN — METOPROLOL TARTRATE 50 MG: 50 TABLET, FILM COATED ORAL at 20:11

## 2021-10-30 RX ADMIN — PANTOPRAZOLE SODIUM 40 MG: 40 TABLET, DELAYED RELEASE ORAL at 05:58

## 2021-10-30 RX ADMIN — HEPARIN SODIUM 15 UNITS/KG/HR: 10000 INJECTION, SOLUTION INTRAVENOUS at 13:44

## 2021-10-30 RX ADMIN — FERRIC OXIDE RED: 8; 8 LOTION TOPICAL at 08:20

## 2021-10-30 RX ADMIN — LORAZEPAM 2 MG: 2 INJECTION INTRAMUSCULAR; INTRAVENOUS at 00:46

## 2021-10-31 PROBLEM — R56.9 UNSPECIFIED CONVULSIONS (HCC): Status: ACTIVE | Noted: 2021-03-04

## 2021-10-31 PROBLEM — I48.20 CHRONIC ATRIAL FIBRILLATION, UNSPECIFIED (HCC): Status: ACTIVE | Noted: 2021-05-03

## 2021-10-31 PROBLEM — N17.9 ACUTE KIDNEY INJURY SUPERIMPOSED ON CHRONIC KIDNEY DISEASE (HCC): Status: RESOLVED | Noted: 2021-02-09 | Resolved: 2021-10-31

## 2021-10-31 PROBLEM — J18.9 MULTIFOCAL PNEUMONIA: Status: RESOLVED | Noted: 2021-08-11 | Resolved: 2021-10-31

## 2021-10-31 PROBLEM — N18.9 ACUTE KIDNEY INJURY SUPERIMPOSED ON CHRONIC KIDNEY DISEASE (HCC): Status: RESOLVED | Noted: 2021-02-09 | Resolved: 2021-10-31

## 2021-10-31 PROBLEM — J96.22 ACUTE ON CHRONIC RESPIRATORY FAILURE WITH HYPOXIA AND HYPERCAPNIA (HCC): Status: RESOLVED | Noted: 2021-09-13 | Resolved: 2021-10-31

## 2021-10-31 PROBLEM — J96.21 ACUTE ON CHRONIC RESPIRATORY FAILURE WITH HYPOXIA AND HYPERCAPNIA (HCC): Status: RESOLVED | Noted: 2021-09-13 | Resolved: 2021-10-31

## 2021-10-31 LAB
ANION GAP SERPL CALCULATED.3IONS-SCNC: 1 MMOL/L (ref 4–13)
APTT PPP: 84 SECONDS (ref 23–37)
BACTERIA BLD CULT: NORMAL
BACTERIA BLD CULT: NORMAL
BUN SERPL-MCNC: 21 MG/DL (ref 5–25)
CALCIUM SERPL-MCNC: 8.2 MG/DL (ref 8.3–10.1)
CHLORIDE SERPL-SCNC: 107 MMOL/L (ref 100–108)
CO2 SERPL-SCNC: 32 MMOL/L (ref 21–32)
CREAT SERPL-MCNC: 1.35 MG/DL (ref 0.6–1.3)
ERYTHROCYTE [DISTWIDTH] IN BLOOD BY AUTOMATED COUNT: 19.9 % (ref 11.6–15.1)
GFR SERPL CREATININE-BSD FRML MDRD: 56 ML/MIN/1.73SQ M
GLUCOSE SERPL-MCNC: 136 MG/DL (ref 65–140)
GLUCOSE SERPL-MCNC: 73 MG/DL (ref 65–140)
GLUCOSE SERPL-MCNC: 75 MG/DL (ref 65–140)
GLUCOSE SERPL-MCNC: 95 MG/DL (ref 65–140)
HCT VFR BLD AUTO: 33.9 % (ref 36.5–49.3)
HGB BLD-MCNC: 8.9 G/DL (ref 12–17)
MAGNESIUM SERPL-MCNC: 2.3 MG/DL (ref 1.6–2.6)
MCH RBC QN AUTO: 21.7 PG (ref 26.8–34.3)
MCHC RBC AUTO-ENTMCNC: 26.3 G/DL (ref 31.4–37.4)
MCV RBC AUTO: 83 FL (ref 82–98)
PHOSPHATE SERPL-MCNC: 3.8 MG/DL (ref 2.3–4.1)
PLATELET # BLD AUTO: 150 THOUSANDS/UL (ref 149–390)
POTASSIUM SERPL-SCNC: 3.9 MMOL/L (ref 3.5–5.3)
RBC # BLD AUTO: 4.1 MILLION/UL (ref 3.88–5.62)
SODIUM SERPL-SCNC: 140 MMOL/L (ref 136–145)
WBC # BLD AUTO: 11.73 THOUSAND/UL (ref 4.31–10.16)

## 2021-10-31 PROCEDURE — 94660 CPAP INITIATION&MGMT: CPT

## 2021-10-31 PROCEDURE — 83735 ASSAY OF MAGNESIUM: CPT | Performed by: FAMILY MEDICINE

## 2021-10-31 PROCEDURE — NC001 PR NO CHARGE: Performed by: INTERNAL MEDICINE

## 2021-10-31 PROCEDURE — 84100 ASSAY OF PHOSPHORUS: CPT | Performed by: FAMILY MEDICINE

## 2021-10-31 PROCEDURE — 82948 REAGENT STRIP/BLOOD GLUCOSE: CPT

## 2021-10-31 PROCEDURE — 99232 SBSQ HOSP IP/OBS MODERATE 35: CPT | Performed by: INTERNAL MEDICINE

## 2021-10-31 PROCEDURE — 94760 N-INVAS EAR/PLS OXIMETRY 1: CPT

## 2021-10-31 PROCEDURE — 99291 CRITICAL CARE FIRST HOUR: CPT | Performed by: INTERNAL MEDICINE

## 2021-10-31 PROCEDURE — 80048 BASIC METABOLIC PNL TOTAL CA: CPT | Performed by: FAMILY MEDICINE

## 2021-10-31 PROCEDURE — 85730 THROMBOPLASTIN TIME PARTIAL: CPT | Performed by: INTERNAL MEDICINE

## 2021-10-31 PROCEDURE — 85027 COMPLETE CBC AUTOMATED: CPT | Performed by: FAMILY MEDICINE

## 2021-10-31 RX ORDER — NOREPINEPHRINE BITARTRATE 1 MG/ML
INJECTION, SOLUTION INTRAVENOUS
Status: COMPLETED
Start: 2021-10-31 | End: 2021-10-31

## 2021-10-31 RX ADMIN — FERRIC OXIDE RED 1 APPLICATION: 8; 8 LOTION TOPICAL at 09:14

## 2021-10-31 RX ADMIN — LEVETIRACETAM 1500 MG: 750 TABLET, FILM COATED ORAL at 08:46

## 2021-10-31 RX ADMIN — DILTIAZEM HYDROCHLORIDE 30 MG: 30 TABLET, FILM COATED ORAL at 05:05

## 2021-10-31 RX ADMIN — ATORVASTATIN CALCIUM 40 MG: 40 TABLET, FILM COATED ORAL at 15:54

## 2021-10-31 RX ADMIN — LIDOCAINE 5% 2 PATCH: 700 PATCH TOPICAL at 08:33

## 2021-10-31 RX ADMIN — FERRIC OXIDE RED: 8; 8 LOTION TOPICAL at 11:58

## 2021-10-31 RX ADMIN — ASPIRIN 81 MG CHEWABLE TABLET 81 MG: 81 TABLET CHEWABLE at 08:34

## 2021-10-31 RX ADMIN — DILTIAZEM HYDROCHLORIDE 30 MG: 30 TABLET, FILM COATED ORAL at 01:27

## 2021-10-31 RX ADMIN — DILTIAZEM HYDROCHLORIDE 30 MG: 30 TABLET, FILM COATED ORAL at 18:36

## 2021-10-31 RX ADMIN — LORATADINE 10 MG: 10 TABLET ORAL at 08:34

## 2021-10-31 RX ADMIN — METOPROLOL TARTRATE 50 MG: 50 TABLET, FILM COATED ORAL at 20:53

## 2021-10-31 RX ADMIN — METOPROLOL TARTRATE 50 MG: 50 TABLET, FILM COATED ORAL at 15:54

## 2021-10-31 RX ADMIN — METHYLPREDNISOLONE 16 MG: 16 TABLET ORAL at 08:34

## 2021-10-31 RX ADMIN — METOPROLOL TARTRATE 50 MG: 50 TABLET, FILM COATED ORAL at 08:46

## 2021-10-31 RX ADMIN — DILTIAZEM HYDROCHLORIDE 30 MG: 30 TABLET, FILM COATED ORAL at 12:02

## 2021-10-31 RX ADMIN — PANTOPRAZOLE SODIUM 40 MG: 40 TABLET, DELAYED RELEASE ORAL at 05:05

## 2021-10-31 RX ADMIN — HEPARIN SODIUM 15 UNITS/KG/HR: 10000 INJECTION, SOLUTION INTRAVENOUS at 09:15

## 2021-10-31 RX ADMIN — LEVETIRACETAM 1500 MG: 750 TABLET, FILM COATED ORAL at 20:53

## 2021-11-01 ENCOUNTER — APPOINTMENT (INPATIENT)
Dept: RADIOLOGY | Facility: HOSPITAL | Age: 61
DRG: 100 | End: 2021-11-01
Payer: MEDICARE

## 2021-11-01 PROBLEM — R21 RASH: Status: ACTIVE | Noted: 2021-11-01

## 2021-11-01 PROBLEM — N18.9 CKD (CHRONIC KIDNEY DISEASE): Status: ACTIVE | Noted: 2021-11-01

## 2021-11-01 LAB
ANION GAP SERPL CALCULATED.3IONS-SCNC: 1 MMOL/L (ref 4–13)
APTT PPP: 76 SECONDS (ref 23–37)
ATRIAL RATE: 122 BPM
ATRIAL RATE: 135 BPM
ATRIAL RATE: 87 BPM
BUN SERPL-MCNC: 23 MG/DL (ref 5–25)
CALCIUM SERPL-MCNC: 8.6 MG/DL (ref 8.3–10.1)
CHLORIDE SERPL-SCNC: 105 MMOL/L (ref 100–108)
CO2 SERPL-SCNC: 33 MMOL/L (ref 21–32)
CREAT SERPL-MCNC: 1.58 MG/DL (ref 0.6–1.3)
ERYTHROCYTE [DISTWIDTH] IN BLOOD BY AUTOMATED COUNT: 20.2 % (ref 11.6–15.1)
GFR SERPL CREATININE-BSD FRML MDRD: 46 ML/MIN/1.73SQ M
GLUCOSE SERPL-MCNC: 106 MG/DL (ref 65–140)
GLUCOSE SERPL-MCNC: 113 MG/DL (ref 65–140)
GLUCOSE SERPL-MCNC: 117 MG/DL (ref 65–140)
GLUCOSE SERPL-MCNC: 98 MG/DL (ref 65–140)
HCT VFR BLD AUTO: 33.9 % (ref 36.5–49.3)
HGB BLD-MCNC: 8.9 G/DL (ref 12–17)
MAGNESIUM SERPL-MCNC: 2.4 MG/DL (ref 1.6–2.6)
MCH RBC QN AUTO: 22.3 PG (ref 26.8–34.3)
MCHC RBC AUTO-ENTMCNC: 26.3 G/DL (ref 31.4–37.4)
MCV RBC AUTO: 85 FL (ref 82–98)
P AXIS: 36 DEGREES
PHOSPHATE SERPL-MCNC: 3.3 MG/DL (ref 2.3–4.1)
PLATELET # BLD AUTO: 156 THOUSANDS/UL (ref 149–390)
POTASSIUM SERPL-SCNC: 3.6 MMOL/L (ref 3.5–5.3)
PR INTERVAL: 225 MS
PR INTERVAL: 470 MS
PR INTERVAL: 520 MS
QRS AXIS: 22 DEGREES
QRS AXIS: 22 DEGREES
QRS AXIS: 47 DEGREES
QRSD INTERVAL: 150 MS
QRSD INTERVAL: 154 MS
QRSD INTERVAL: 158 MS
QT INTERVAL: 371 MS
QT INTERVAL: 383 MS
QT INTERVAL: 429 MS
QTC INTERVAL: 517 MS
QTC INTERVAL: 529 MS
QTC INTERVAL: 575 MS
RBC # BLD AUTO: 4 MILLION/UL (ref 3.88–5.62)
SODIUM SERPL-SCNC: 139 MMOL/L (ref 136–145)
T WAVE AXIS: 197 DEGREES
T WAVE AXIS: 203 DEGREES
T WAVE AXIS: 221 DEGREES
VENTRICULAR RATE: 122 BPM
VENTRICULAR RATE: 135 BPM
VENTRICULAR RATE: 87 BPM
WBC # BLD AUTO: 9.65 THOUSAND/UL (ref 4.31–10.16)

## 2021-11-01 PROCEDURE — 85027 COMPLETE CBC AUTOMATED: CPT

## 2021-11-01 PROCEDURE — 94660 CPAP INITIATION&MGMT: CPT

## 2021-11-01 PROCEDURE — 84100 ASSAY OF PHOSPHORUS: CPT | Performed by: STUDENT IN AN ORGANIZED HEALTH CARE EDUCATION/TRAINING PROGRAM

## 2021-11-01 PROCEDURE — 99233 SBSQ HOSP IP/OBS HIGH 50: CPT | Performed by: INTERNAL MEDICINE

## 2021-11-01 PROCEDURE — 82948 REAGENT STRIP/BLOOD GLUCOSE: CPT

## 2021-11-01 PROCEDURE — 83735 ASSAY OF MAGNESIUM: CPT | Performed by: STUDENT IN AN ORGANIZED HEALTH CARE EDUCATION/TRAINING PROGRAM

## 2021-11-01 PROCEDURE — 85730 THROMBOPLASTIN TIME PARTIAL: CPT

## 2021-11-01 PROCEDURE — 93010 ELECTROCARDIOGRAM REPORT: CPT | Performed by: INTERNAL MEDICINE

## 2021-11-01 PROCEDURE — 80048 BASIC METABOLIC PNL TOTAL CA: CPT

## 2021-11-01 PROCEDURE — 99232 SBSQ HOSP IP/OBS MODERATE 35: CPT | Performed by: INTERNAL MEDICINE

## 2021-11-01 PROCEDURE — 71045 X-RAY EXAM CHEST 1 VIEW: CPT

## 2021-11-01 PROCEDURE — 94760 N-INVAS EAR/PLS OXIMETRY 1: CPT

## 2021-11-01 RX ORDER — ASPIRIN 81 MG/1
81 TABLET, CHEWABLE ORAL DAILY
Status: DISCONTINUED | OUTPATIENT
Start: 2021-11-02 | End: 2021-11-09 | Stop reason: HOSPADM

## 2021-11-01 RX ORDER — BUMETANIDE 2 MG/1
2 TABLET ORAL DAILY
Status: DISCONTINUED | OUTPATIENT
Start: 2021-11-02 | End: 2021-11-09 | Stop reason: HOSPADM

## 2021-11-01 RX ORDER — BUMETANIDE 2 MG/1
2 TABLET ORAL DAILY
Status: DISCONTINUED | OUTPATIENT
Start: 2021-11-01 | End: 2021-11-01

## 2021-11-01 RX ADMIN — BUMETANIDE 2 MG: 2 TABLET ORAL at 17:03

## 2021-11-01 RX ADMIN — PANTOPRAZOLE SODIUM 40 MG: 40 TABLET, DELAYED RELEASE ORAL at 05:45

## 2021-11-01 RX ADMIN — ATORVASTATIN CALCIUM 40 MG: 40 TABLET, FILM COATED ORAL at 17:02

## 2021-11-01 RX ADMIN — APIXABAN 5 MG: 5 TABLET, FILM COATED ORAL at 17:03

## 2021-11-01 RX ADMIN — METHYLPREDNISOLONE 12 MG: 4 TABLET ORAL at 11:47

## 2021-11-01 RX ADMIN — METOPROLOL TARTRATE 50 MG: 50 TABLET, FILM COATED ORAL at 09:29

## 2021-11-01 RX ADMIN — METOPROLOL TARTRATE 50 MG: 50 TABLET, FILM COATED ORAL at 22:05

## 2021-11-01 RX ADMIN — HEPARIN SODIUM 15 UNITS/KG/HR: 10000 INJECTION, SOLUTION INTRAVENOUS at 05:43

## 2021-11-01 RX ADMIN — ASPIRIN 81 MG CHEWABLE TABLET 81 MG: 81 TABLET CHEWABLE at 09:29

## 2021-11-01 RX ADMIN — DILTIAZEM HYDROCHLORIDE 30 MG: 30 TABLET, FILM COATED ORAL at 05:45

## 2021-11-01 RX ADMIN — LEVETIRACETAM 1500 MG: 750 TABLET, FILM COATED ORAL at 21:47

## 2021-11-01 RX ADMIN — METOPROLOL TARTRATE 50 MG: 50 TABLET, FILM COATED ORAL at 17:03

## 2021-11-01 RX ADMIN — LIDOCAINE 5% 2 PATCH: 700 PATCH TOPICAL at 09:29

## 2021-11-01 RX ADMIN — DILTIAZEM HYDROCHLORIDE 30 MG: 30 TABLET, FILM COATED ORAL at 00:23

## 2021-11-01 RX ADMIN — APIXABAN 5 MG: 5 TABLET, FILM COATED ORAL at 11:47

## 2021-11-01 RX ADMIN — LEVETIRACETAM 1500 MG: 750 TABLET, FILM COATED ORAL at 09:29

## 2021-11-02 LAB
ATRIAL RATE: 146 BPM
GLUCOSE SERPL-MCNC: 101 MG/DL (ref 65–140)
GLUCOSE SERPL-MCNC: 106 MG/DL (ref 65–140)
GLUCOSE SERPL-MCNC: 115 MG/DL (ref 65–140)
GLUCOSE SERPL-MCNC: 74 MG/DL (ref 65–140)
PR INTERVAL: 441 MS
QRS AXIS: 32 DEGREES
QRSD INTERVAL: 146 MS
QT INTERVAL: 350 MS
QTC INTERVAL: 546 MS
T WAVE AXIS: 199 DEGREES
VENTRICULAR RATE: 146 BPM

## 2021-11-02 PROCEDURE — 94760 N-INVAS EAR/PLS OXIMETRY 1: CPT

## 2021-11-02 PROCEDURE — 82948 REAGENT STRIP/BLOOD GLUCOSE: CPT

## 2021-11-02 PROCEDURE — 93010 ELECTROCARDIOGRAM REPORT: CPT | Performed by: INTERNAL MEDICINE

## 2021-11-02 PROCEDURE — 99232 SBSQ HOSP IP/OBS MODERATE 35: CPT | Performed by: INTERNAL MEDICINE

## 2021-11-02 PROCEDURE — 99232 SBSQ HOSP IP/OBS MODERATE 35: CPT | Performed by: PHYSICIAN ASSISTANT

## 2021-11-02 PROCEDURE — 94660 CPAP INITIATION&MGMT: CPT

## 2021-11-02 RX ORDER — METOPROLOL TARTRATE 5 MG/5ML
5 INJECTION INTRAVENOUS EVERY 6 HOURS
Status: DISCONTINUED | OUTPATIENT
Start: 2021-11-02 | End: 2021-11-03

## 2021-11-02 RX ORDER — METOPROLOL TARTRATE 5 MG/5ML
2.5 INJECTION INTRAVENOUS ONCE
Status: COMPLETED | OUTPATIENT
Start: 2021-11-02 | End: 2021-11-02

## 2021-11-02 RX ORDER — MAGNESIUM SULFATE HEPTAHYDRATE 40 MG/ML
2 INJECTION, SOLUTION INTRAVENOUS ONCE
Status: COMPLETED | OUTPATIENT
Start: 2021-11-02 | End: 2021-11-02

## 2021-11-02 RX ORDER — METOPROLOL TARTRATE 50 MG/1
50 TABLET, FILM COATED ORAL EVERY 6 HOURS
Status: DISCONTINUED | OUTPATIENT
Start: 2021-11-02 | End: 2021-11-02

## 2021-11-02 RX ADMIN — APIXABAN 5 MG: 5 TABLET, FILM COATED ORAL at 09:17

## 2021-11-02 RX ADMIN — MAGNESIUM SULFATE HEPTAHYDRATE 2 G: 40 INJECTION, SOLUTION INTRAVENOUS at 20:29

## 2021-11-02 RX ADMIN — PANTOPRAZOLE SODIUM 40 MG: 40 TABLET, DELAYED RELEASE ORAL at 06:44

## 2021-11-02 RX ADMIN — METOROPROLOL TARTRATE 2.5 MG: 5 INJECTION, SOLUTION INTRAVENOUS at 04:45

## 2021-11-02 RX ADMIN — LEVETIRACETAM 1500 MG: 750 TABLET, FILM COATED ORAL at 20:36

## 2021-11-02 RX ADMIN — METOPROLOL TARTRATE 50 MG: 50 TABLET, FILM COATED ORAL at 11:53

## 2021-11-02 RX ADMIN — ASPIRIN 81 MG CHEWABLE TABLET 81 MG: 81 TABLET CHEWABLE at 09:16

## 2021-11-02 RX ADMIN — METHYLPREDNISOLONE 8 MG: 4 TABLET ORAL at 09:22

## 2021-11-02 RX ADMIN — METOROPROLOL TARTRATE 5 MG: 5 INJECTION, SOLUTION INTRAVENOUS at 19:14

## 2021-11-02 RX ADMIN — LEVETIRACETAM 1500 MG: 750 TABLET, FILM COATED ORAL at 09:17

## 2021-11-03 LAB
ANION GAP SERPL CALCULATED.3IONS-SCNC: 2 MMOL/L (ref 4–13)
ANISOCYTOSIS BLD QL SMEAR: PRESENT
ARTIFACT: PRESENT
BASO STIPL BLD QL SMEAR: PRESENT
BASOPHILS # BLD MANUAL: 0.07 THOUSAND/UL (ref 0–0.1)
BASOPHILS NFR MAR MANUAL: 1 % (ref 0–1)
BUN SERPL-MCNC: 24 MG/DL (ref 5–25)
CALCIUM SERPL-MCNC: 8.5 MG/DL (ref 8.3–10.1)
CHLORIDE SERPL-SCNC: 106 MMOL/L (ref 100–108)
CO2 SERPL-SCNC: 34 MMOL/L (ref 21–32)
CREAT SERPL-MCNC: 1.14 MG/DL (ref 0.6–1.3)
EOSINOPHIL # BLD MANUAL: 0.55 THOUSAND/UL (ref 0–0.4)
EOSINOPHIL NFR BLD MANUAL: 8 % (ref 0–6)
ERYTHROCYTE [DISTWIDTH] IN BLOOD BY AUTOMATED COUNT: 20.8 % (ref 11.6–15.1)
GFR SERPL CREATININE-BSD FRML MDRD: 69 ML/MIN/1.73SQ M
GLUCOSE SERPL-MCNC: 118 MG/DL (ref 65–140)
GLUCOSE SERPL-MCNC: 82 MG/DL (ref 65–140)
GLUCOSE SERPL-MCNC: 84 MG/DL (ref 65–140)
GLUCOSE SERPL-MCNC: 94 MG/DL (ref 65–140)
GLUCOSE SERPL-MCNC: 95 MG/DL (ref 65–140)
HCT VFR BLD AUTO: 32.1 % (ref 36.5–49.3)
HGB BLD-MCNC: 8.7 G/DL (ref 12–17)
LYMPHOCYTES # BLD AUTO: 0.82 THOUSAND/UL (ref 0.6–4.47)
LYMPHOCYTES # BLD AUTO: 12 % (ref 14–44)
MCH RBC QN AUTO: 22 PG (ref 26.8–34.3)
MCHC RBC AUTO-ENTMCNC: 27.1 G/DL (ref 31.4–37.4)
MCV RBC AUTO: 81 FL (ref 82–98)
MICROCYTES BLD QL AUTO: PRESENT
MONOCYTES # BLD AUTO: 0.48 THOUSAND/UL (ref 0–1.22)
MONOCYTES NFR BLD: 7 % (ref 4–12)
MYELOCYTES NFR BLD MANUAL: 2 % (ref 0–1)
NEUTROPHILS # BLD MANUAL: 4.67 THOUSAND/UL (ref 1.85–7.62)
NEUTS BAND NFR BLD MANUAL: 1 % (ref 0–8)
NEUTS SEG NFR BLD AUTO: 67 % (ref 43–75)
OVALOCYTES BLD QL SMEAR: PRESENT
PLATELET # BLD AUTO: 180 THOUSANDS/UL (ref 149–390)
PLATELET BLD QL SMEAR: ADEQUATE
PMV BLD AUTO: 12.4 FL (ref 8.9–12.7)
POIKILOCYTOSIS BLD QL SMEAR: PRESENT
POLYCHROMASIA BLD QL SMEAR: PRESENT
POTASSIUM SERPL-SCNC: 3.5 MMOL/L (ref 3.5–5.3)
RBC # BLD AUTO: 3.95 MILLION/UL (ref 3.88–5.62)
RBC MORPH BLD: PRESENT
SODIUM SERPL-SCNC: 142 MMOL/L (ref 136–145)
TARGETS BLD QL SMEAR: PRESENT
VARIANT LYMPHS # BLD AUTO: 2 %
WBC # BLD AUTO: 6.87 THOUSAND/UL (ref 4.31–10.16)

## 2021-11-03 PROCEDURE — 80048 BASIC METABOLIC PNL TOTAL CA: CPT | Performed by: PHYSICIAN ASSISTANT

## 2021-11-03 PROCEDURE — 82948 REAGENT STRIP/BLOOD GLUCOSE: CPT

## 2021-11-03 PROCEDURE — 94660 CPAP INITIATION&MGMT: CPT

## 2021-11-03 PROCEDURE — 94760 N-INVAS EAR/PLS OXIMETRY 1: CPT

## 2021-11-03 PROCEDURE — 85027 COMPLETE CBC AUTOMATED: CPT | Performed by: PHYSICIAN ASSISTANT

## 2021-11-03 PROCEDURE — 99232 SBSQ HOSP IP/OBS MODERATE 35: CPT | Performed by: PHYSICIAN ASSISTANT

## 2021-11-03 PROCEDURE — 99232 SBSQ HOSP IP/OBS MODERATE 35: CPT | Performed by: INTERNAL MEDICINE

## 2021-11-03 PROCEDURE — 85007 BL SMEAR W/DIFF WBC COUNT: CPT | Performed by: PHYSICIAN ASSISTANT

## 2021-11-03 RX ORDER — BUMETANIDE 0.25 MG/ML
2 INJECTION, SOLUTION INTRAMUSCULAR; INTRAVENOUS ONCE
Status: COMPLETED | OUTPATIENT
Start: 2021-11-03 | End: 2021-11-03

## 2021-11-03 RX ORDER — LANOLIN ALCOHOL/MO/W.PET/CERES
CREAM (GRAM) TOPICAL DAILY
Status: DISCONTINUED | OUTPATIENT
Start: 2021-11-03 | End: 2021-11-09 | Stop reason: HOSPADM

## 2021-11-03 RX ORDER — METOPROLOL SUCCINATE 50 MG/1
50 TABLET, EXTENDED RELEASE ORAL ONCE
Status: COMPLETED | OUTPATIENT
Start: 2021-11-03 | End: 2021-11-03

## 2021-11-03 RX ORDER — POTASSIUM CHLORIDE 20 MEQ/1
40 TABLET, EXTENDED RELEASE ORAL 2 TIMES DAILY
Status: COMPLETED | OUTPATIENT
Start: 2021-11-03 | End: 2021-11-03

## 2021-11-03 RX ORDER — METOPROLOL SUCCINATE 100 MG/1
100 TABLET, EXTENDED RELEASE ORAL 2 TIMES DAILY
Status: DISCONTINUED | OUTPATIENT
Start: 2021-11-03 | End: 2021-11-09 | Stop reason: HOSPADM

## 2021-11-03 RX ADMIN — BUMETANIDE 2 MG: 2 TABLET ORAL at 09:14

## 2021-11-03 RX ADMIN — METOPROLOL SUCCINATE 100 MG: 100 TABLET, EXTENDED RELEASE ORAL at 22:11

## 2021-11-03 RX ADMIN — METOROPROLOL TARTRATE 5 MG: 5 INJECTION, SOLUTION INTRAVENOUS at 06:17

## 2021-11-03 RX ADMIN — BUMETANIDE 2 MG: 0.25 INJECTION, SOLUTION INTRAMUSCULAR; INTRAVENOUS at 16:13

## 2021-11-03 RX ADMIN — ASPIRIN 81 MG CHEWABLE TABLET 81 MG: 81 TABLET CHEWABLE at 09:09

## 2021-11-03 RX ADMIN — METHYLPREDNISOLONE 4 MG: 4 TABLET ORAL at 09:15

## 2021-11-03 RX ADMIN — ATORVASTATIN CALCIUM 40 MG: 40 TABLET, FILM COATED ORAL at 17:29

## 2021-11-03 RX ADMIN — LEVETIRACETAM 1500 MG: 750 TABLET, FILM COATED ORAL at 22:11

## 2021-11-03 RX ADMIN — POTASSIUM CHLORIDE 40 MEQ: 1500 TABLET, EXTENDED RELEASE ORAL at 12:11

## 2021-11-03 RX ADMIN — APIXABAN 5 MG: 5 TABLET, FILM COATED ORAL at 17:29

## 2021-11-03 RX ADMIN — LEVETIRACETAM 1500 MG: 750 TABLET, FILM COATED ORAL at 09:09

## 2021-11-03 RX ADMIN — APIXABAN 5 MG: 5 TABLET, FILM COATED ORAL at 09:09

## 2021-11-03 RX ADMIN — Medication: at 12:13

## 2021-11-03 RX ADMIN — METOPROLOL SUCCINATE 50 MG: 50 TABLET, EXTENDED RELEASE ORAL at 12:13

## 2021-11-03 RX ADMIN — PANTOPRAZOLE SODIUM 40 MG: 40 TABLET, DELAYED RELEASE ORAL at 06:17

## 2021-11-03 RX ADMIN — POTASSIUM CHLORIDE 40 MEQ: 1500 TABLET, EXTENDED RELEASE ORAL at 17:29

## 2021-11-04 LAB
ANION GAP SERPL CALCULATED.3IONS-SCNC: 1 MMOL/L (ref 4–13)
ATRIAL RATE: 133 BPM
ATRIAL RATE: 147 BPM
BUN SERPL-MCNC: 18 MG/DL (ref 5–25)
CALCIUM SERPL-MCNC: 8.9 MG/DL (ref 8.3–10.1)
CHLORIDE SERPL-SCNC: 103 MMOL/L (ref 100–108)
CO2 SERPL-SCNC: 35 MMOL/L (ref 21–32)
CREAT SERPL-MCNC: 0.95 MG/DL (ref 0.6–1.3)
GFR SERPL CREATININE-BSD FRML MDRD: 85 ML/MIN/1.73SQ M
GLUCOSE SERPL-MCNC: 104 MG/DL (ref 65–140)
GLUCOSE SERPL-MCNC: 114 MG/DL (ref 65–140)
GLUCOSE SERPL-MCNC: 83 MG/DL (ref 65–140)
MAGNESIUM SERPL-MCNC: 1.8 MG/DL (ref 1.6–2.6)
POTASSIUM SERPL-SCNC: 3.1 MMOL/L (ref 3.5–5.3)
PROCALCITONIN SERPL-MCNC: <0.05 NG/ML
QRS AXIS: 22 DEGREES
QRS AXIS: 28 DEGREES
QRSD INTERVAL: 152 MS
QRSD INTERVAL: 156 MS
QT INTERVAL: 360 MS
QT INTERVAL: 364 MS
QTC INTERVAL: 559 MS
QTC INTERVAL: 561 MS
SODIUM SERPL-SCNC: 139 MMOL/L (ref 136–145)
T WAVE AXIS: 149 DEGREES
T WAVE AXIS: 156 DEGREES
VENTRICULAR RATE: 143 BPM
VENTRICULAR RATE: 145 BPM

## 2021-11-04 PROCEDURE — 84145 PROCALCITONIN (PCT): CPT | Performed by: INTERNAL MEDICINE

## 2021-11-04 PROCEDURE — NC001 PR NO CHARGE: Performed by: INTERNAL MEDICINE

## 2021-11-04 PROCEDURE — 94760 N-INVAS EAR/PLS OXIMETRY 1: CPT

## 2021-11-04 PROCEDURE — 99232 SBSQ HOSP IP/OBS MODERATE 35: CPT | Performed by: PHYSICIAN ASSISTANT

## 2021-11-04 PROCEDURE — 83735 ASSAY OF MAGNESIUM: CPT | Performed by: STUDENT IN AN ORGANIZED HEALTH CARE EDUCATION/TRAINING PROGRAM

## 2021-11-04 PROCEDURE — 82948 REAGENT STRIP/BLOOD GLUCOSE: CPT

## 2021-11-04 PROCEDURE — 93010 ELECTROCARDIOGRAM REPORT: CPT | Performed by: INTERNAL MEDICINE

## 2021-11-04 PROCEDURE — 80048 BASIC METABOLIC PNL TOTAL CA: CPT | Performed by: PHYSICIAN ASSISTANT

## 2021-11-04 PROCEDURE — 93005 ELECTROCARDIOGRAM TRACING: CPT

## 2021-11-04 RX ORDER — DIGOXIN 0.25 MG/ML
125 INJECTION INTRAMUSCULAR; INTRAVENOUS ONCE
Status: COMPLETED | OUTPATIENT
Start: 2021-11-04 | End: 2021-11-04

## 2021-11-04 RX ORDER — DILTIAZEM HYDROCHLORIDE 5 MG/ML
10 INJECTION INTRAVENOUS ONCE
Status: COMPLETED | OUTPATIENT
Start: 2021-11-04 | End: 2021-11-04

## 2021-11-04 RX ORDER — POTASSIUM CHLORIDE 20 MEQ/1
40 TABLET, EXTENDED RELEASE ORAL ONCE
Status: COMPLETED | OUTPATIENT
Start: 2021-11-04 | End: 2021-11-04

## 2021-11-04 RX ORDER — METOPROLOL TARTRATE 5 MG/5ML
5 INJECTION INTRAVENOUS EVERY 6 HOURS PRN
Status: DISCONTINUED | OUTPATIENT
Start: 2021-11-04 | End: 2021-11-09 | Stop reason: HOSPADM

## 2021-11-04 RX ADMIN — LEVETIRACETAM 1500 MG: 750 TABLET, FILM COATED ORAL at 22:23

## 2021-11-04 RX ADMIN — DILTIAZEM HYDROCHLORIDE 10 MG: 5 INJECTION INTRAVENOUS at 22:20

## 2021-11-04 RX ADMIN — DILTIAZEM HYDROCHLORIDE 30 MG: 30 TABLET, FILM COATED ORAL at 22:22

## 2021-11-04 RX ADMIN — APIXABAN 5 MG: 5 TABLET, FILM COATED ORAL at 09:24

## 2021-11-04 RX ADMIN — Medication: at 09:27

## 2021-11-04 RX ADMIN — LEVETIRACETAM 1500 MG: 750 TABLET, FILM COATED ORAL at 09:24

## 2021-11-04 RX ADMIN — PANTOPRAZOLE SODIUM 40 MG: 40 TABLET, DELAYED RELEASE ORAL at 05:59

## 2021-11-04 RX ADMIN — DIGOXIN 125 MCG: 0.25 INJECTION INTRAMUSCULAR; INTRAVENOUS at 18:33

## 2021-11-04 RX ADMIN — SODIUM CHLORIDE 250 ML: 0.9 INJECTION, SOLUTION INTRAVENOUS at 18:34

## 2021-11-04 RX ADMIN — ASPIRIN 81 MG CHEWABLE TABLET 81 MG: 81 TABLET CHEWABLE at 09:24

## 2021-11-04 RX ADMIN — METOPROLOL SUCCINATE 100 MG: 100 TABLET, EXTENDED RELEASE ORAL at 22:22

## 2021-11-04 RX ADMIN — METOROPROLOL TARTRATE 5 MG: 5 INJECTION, SOLUTION INTRAVENOUS at 15:23

## 2021-11-04 RX ADMIN — POTASSIUM CHLORIDE 40 MEQ: 1500 TABLET, EXTENDED RELEASE ORAL at 18:33

## 2021-11-04 RX ADMIN — METOPROLOL SUCCINATE 100 MG: 100 TABLET, EXTENDED RELEASE ORAL at 09:24

## 2021-11-04 RX ADMIN — DIGOXIN 125 MCG: 0.25 INJECTION INTRAMUSCULAR; INTRAVENOUS at 17:48

## 2021-11-04 RX ADMIN — POTASSIUM CHLORIDE 40 MEQ: 1500 TABLET, EXTENDED RELEASE ORAL at 15:23

## 2021-11-04 RX ADMIN — APIXABAN 5 MG: 5 TABLET, FILM COATED ORAL at 17:56

## 2021-11-04 RX ADMIN — SODIUM CHLORIDE 250 ML: 0.9 INJECTION, SOLUTION INTRAVENOUS at 20:35

## 2021-11-04 RX ADMIN — ATORVASTATIN CALCIUM 40 MG: 40 TABLET, FILM COATED ORAL at 15:23

## 2021-11-05 LAB
ANION GAP SERPL CALCULATED.3IONS-SCNC: 2 MMOL/L (ref 4–13)
BASOPHILS # BLD AUTO: 0.06 THOUSANDS/ΜL (ref 0–0.1)
BASOPHILS NFR BLD AUTO: 1 % (ref 0–1)
BUN SERPL-MCNC: 17 MG/DL (ref 5–25)
CALCIUM SERPL-MCNC: 8.4 MG/DL (ref 8.3–10.1)
CHLORIDE SERPL-SCNC: 105 MMOL/L (ref 100–108)
CO2 SERPL-SCNC: 35 MMOL/L (ref 21–32)
CREAT SERPL-MCNC: 0.89 MG/DL (ref 0.6–1.3)
EOSINOPHIL # BLD AUTO: 0.85 THOUSAND/ΜL (ref 0–0.61)
EOSINOPHIL NFR BLD AUTO: 7 % (ref 0–6)
ERYTHROCYTE [DISTWIDTH] IN BLOOD BY AUTOMATED COUNT: 20.5 % (ref 11.6–15.1)
EST. AVERAGE GLUCOSE BLD GHB EST-MCNC: 120 MG/DL
FERRITIN SERPL-MCNC: 38 NG/ML (ref 8–388)
FLUAV RNA RESP QL NAA+PROBE: NEGATIVE
FLUBV RNA RESP QL NAA+PROBE: NEGATIVE
GFR SERPL CREATININE-BSD FRML MDRD: 92 ML/MIN/1.73SQ M
GLUCOSE SERPL-MCNC: 87 MG/DL (ref 65–140)
GLUCOSE SERPL-MCNC: 97 MG/DL (ref 65–140)
HBA1C MFR BLD: 5.8 %
HCT VFR BLD AUTO: 34.7 % (ref 36.5–49.3)
HGB BLD-MCNC: 9.8 G/DL (ref 12–17)
IMM GRANULOCYTES # BLD AUTO: 0.26 THOUSAND/UL (ref 0–0.2)
IMM GRANULOCYTES NFR BLD AUTO: 2 % (ref 0–2)
IRON SATN MFR SERPL: 9 % (ref 20–50)
IRON SERPL-MCNC: 27 UG/DL (ref 65–175)
LYMPHOCYTES # BLD AUTO: 1.27 THOUSANDS/ΜL (ref 0.6–4.47)
LYMPHOCYTES NFR BLD AUTO: 10 % (ref 14–44)
MAGNESIUM SERPL-MCNC: 1.4 MG/DL (ref 1.6–2.6)
MCH RBC QN AUTO: 22.3 PG (ref 26.8–34.3)
MCHC RBC AUTO-ENTMCNC: 28.2 G/DL (ref 31.4–37.4)
MCV RBC AUTO: 79 FL (ref 82–98)
MONOCYTES # BLD AUTO: 0.97 THOUSAND/ΜL (ref 0.17–1.22)
MONOCYTES NFR BLD AUTO: 8 % (ref 4–12)
NEUTROPHILS # BLD AUTO: 8.78 THOUSANDS/ΜL (ref 1.85–7.62)
NEUTS SEG NFR BLD AUTO: 72 % (ref 43–75)
NRBC BLD AUTO-RTO: 0 /100 WBCS
PLATELET # BLD AUTO: 184 THOUSANDS/UL (ref 149–390)
PMV BLD AUTO: 11.5 FL (ref 8.9–12.7)
POTASSIUM SERPL-SCNC: 3.2 MMOL/L (ref 3.5–5.3)
PROCALCITONIN SERPL-MCNC: 0.08 NG/ML
RBC # BLD AUTO: 4.4 MILLION/UL (ref 3.88–5.62)
RSV RNA RESP QL NAA+PROBE: NEGATIVE
SARS-COV-2 RNA RESP QL NAA+PROBE: NEGATIVE
SODIUM SERPL-SCNC: 142 MMOL/L (ref 136–145)
TIBC SERPL-MCNC: 296 UG/DL (ref 250–450)
WBC # BLD AUTO: 12.19 THOUSAND/UL (ref 4.31–10.16)

## 2021-11-05 PROCEDURE — 84145 PROCALCITONIN (PCT): CPT | Performed by: INTERNAL MEDICINE

## 2021-11-05 PROCEDURE — 99232 SBSQ HOSP IP/OBS MODERATE 35: CPT | Performed by: INTERNAL MEDICINE

## 2021-11-05 PROCEDURE — 80048 BASIC METABOLIC PNL TOTAL CA: CPT | Performed by: PHYSICIAN ASSISTANT

## 2021-11-05 PROCEDURE — 83540 ASSAY OF IRON: CPT | Performed by: INTERNAL MEDICINE

## 2021-11-05 PROCEDURE — 99222 1ST HOSP IP/OBS MODERATE 55: CPT | Performed by: PSYCHIATRY & NEUROLOGY

## 2021-11-05 PROCEDURE — 82728 ASSAY OF FERRITIN: CPT | Performed by: INTERNAL MEDICINE

## 2021-11-05 PROCEDURE — 83036 HEMOGLOBIN GLYCOSYLATED A1C: CPT | Performed by: INTERNAL MEDICINE

## 2021-11-05 PROCEDURE — 83550 IRON BINDING TEST: CPT | Performed by: INTERNAL MEDICINE

## 2021-11-05 PROCEDURE — 85025 COMPLETE CBC W/AUTO DIFF WBC: CPT | Performed by: PHYSICIAN ASSISTANT

## 2021-11-05 PROCEDURE — 83735 ASSAY OF MAGNESIUM: CPT | Performed by: PHYSICIAN ASSISTANT

## 2021-11-05 PROCEDURE — 82948 REAGENT STRIP/BLOOD GLUCOSE: CPT

## 2021-11-05 PROCEDURE — 0241U HB NFCT DS VIR RESP RNA 4 TRGT: CPT | Performed by: PHYSICIAN ASSISTANT

## 2021-11-05 RX ORDER — MAGNESIUM SULFATE HEPTAHYDRATE 40 MG/ML
2 INJECTION, SOLUTION INTRAVENOUS ONCE
Status: COMPLETED | OUTPATIENT
Start: 2021-11-05 | End: 2021-11-05

## 2021-11-05 RX ORDER — POTASSIUM CHLORIDE 20 MEQ/1
40 TABLET, EXTENDED RELEASE ORAL 2 TIMES DAILY
Status: COMPLETED | OUTPATIENT
Start: 2021-11-05 | End: 2021-11-05

## 2021-11-05 RX ADMIN — MAGNESIUM SULFATE HEPTAHYDRATE 2 G: 40 INJECTION, SOLUTION INTRAVENOUS at 11:49

## 2021-11-05 RX ADMIN — ATORVASTATIN CALCIUM 40 MG: 40 TABLET, FILM COATED ORAL at 17:35

## 2021-11-05 RX ADMIN — DILTIAZEM HYDROCHLORIDE 30 MG: 30 TABLET, FILM COATED ORAL at 06:46

## 2021-11-05 RX ADMIN — APIXABAN 5 MG: 5 TABLET, FILM COATED ORAL at 08:55

## 2021-11-05 RX ADMIN — DILTIAZEM HYDROCHLORIDE 30 MG: 30 TABLET, FILM COATED ORAL at 17:35

## 2021-11-05 RX ADMIN — LEVETIRACETAM 1500 MG: 750 TABLET, FILM COATED ORAL at 08:55

## 2021-11-05 RX ADMIN — APIXABAN 5 MG: 5 TABLET, FILM COATED ORAL at 17:35

## 2021-11-05 RX ADMIN — PANTOPRAZOLE SODIUM 40 MG: 40 TABLET, DELAYED RELEASE ORAL at 06:46

## 2021-11-05 RX ADMIN — ASPIRIN 81 MG CHEWABLE TABLET 81 MG: 81 TABLET CHEWABLE at 08:55

## 2021-11-05 RX ADMIN — LEVETIRACETAM 1500 MG: 750 TABLET, FILM COATED ORAL at 22:16

## 2021-11-05 RX ADMIN — POTASSIUM CHLORIDE 40 MEQ: 1500 TABLET, EXTENDED RELEASE ORAL at 08:55

## 2021-11-05 RX ADMIN — Medication: at 08:56

## 2021-11-05 RX ADMIN — DILTIAZEM HYDROCHLORIDE 30 MG: 30 TABLET, FILM COATED ORAL at 11:52

## 2021-11-05 RX ADMIN — METOPROLOL SUCCINATE 100 MG: 100 TABLET, EXTENDED RELEASE ORAL at 22:16

## 2021-11-05 RX ADMIN — METOPROLOL SUCCINATE 100 MG: 100 TABLET, EXTENDED RELEASE ORAL at 08:55

## 2021-11-05 RX ADMIN — BUMETANIDE 2 MG: 2 TABLET ORAL at 08:55

## 2021-11-05 RX ADMIN — POTASSIUM CHLORIDE 40 MEQ: 1500 TABLET, EXTENDED RELEASE ORAL at 11:52

## 2021-11-06 LAB
ANION GAP SERPL CALCULATED.3IONS-SCNC: 5 MMOL/L (ref 4–13)
BASOPHILS # BLD AUTO: 0.07 THOUSANDS/ΜL (ref 0–0.1)
BASOPHILS NFR BLD AUTO: 1 % (ref 0–1)
BUN SERPL-MCNC: 15 MG/DL (ref 5–25)
CALCIUM SERPL-MCNC: 8.7 MG/DL (ref 8.3–10.1)
CHLORIDE SERPL-SCNC: 102 MMOL/L (ref 100–108)
CO2 SERPL-SCNC: 35 MMOL/L (ref 21–32)
CREAT SERPL-MCNC: 0.9 MG/DL (ref 0.6–1.3)
EOSINOPHIL # BLD AUTO: 0.79 THOUSAND/ΜL (ref 0–0.61)
EOSINOPHIL NFR BLD AUTO: 7 % (ref 0–6)
ERYTHROCYTE [DISTWIDTH] IN BLOOD BY AUTOMATED COUNT: 21.4 % (ref 11.6–15.1)
GFR SERPL CREATININE-BSD FRML MDRD: 91 ML/MIN/1.73SQ M
GLUCOSE SERPL-MCNC: 77 MG/DL (ref 65–140)
HCT VFR BLD AUTO: 38.9 % (ref 36.5–49.3)
HGB BLD-MCNC: 10.8 G/DL (ref 12–17)
IMM GRANULOCYTES # BLD AUTO: 0.23 THOUSAND/UL (ref 0–0.2)
IMM GRANULOCYTES NFR BLD AUTO: 2 % (ref 0–2)
LYMPHOCYTES # BLD AUTO: 0.94 THOUSANDS/ΜL (ref 0.6–4.47)
LYMPHOCYTES NFR BLD AUTO: 8 % (ref 14–44)
MAGNESIUM SERPL-MCNC: 2.2 MG/DL (ref 1.6–2.6)
MCH RBC QN AUTO: 22.2 PG (ref 26.8–34.3)
MCHC RBC AUTO-ENTMCNC: 27.8 G/DL (ref 31.4–37.4)
MCV RBC AUTO: 80 FL (ref 82–98)
MONOCYTES # BLD AUTO: 0.62 THOUSAND/ΜL (ref 0.17–1.22)
MONOCYTES NFR BLD AUTO: 6 % (ref 4–12)
NEUTROPHILS # BLD AUTO: 8.66 THOUSANDS/ΜL (ref 1.85–7.62)
NEUTS SEG NFR BLD AUTO: 76 % (ref 43–75)
NRBC BLD AUTO-RTO: 0 /100 WBCS
PLATELET # BLD AUTO: 209 THOUSANDS/UL (ref 149–390)
POTASSIUM SERPL-SCNC: 3.2 MMOL/L (ref 3.5–5.3)
RBC # BLD AUTO: 4.86 MILLION/UL (ref 3.88–5.62)
SODIUM SERPL-SCNC: 142 MMOL/L (ref 136–145)
WBC # BLD AUTO: 11.31 THOUSAND/UL (ref 4.31–10.16)

## 2021-11-06 PROCEDURE — 85025 COMPLETE CBC W/AUTO DIFF WBC: CPT | Performed by: INTERNAL MEDICINE

## 2021-11-06 PROCEDURE — 80048 BASIC METABOLIC PNL TOTAL CA: CPT | Performed by: INTERNAL MEDICINE

## 2021-11-06 PROCEDURE — 83735 ASSAY OF MAGNESIUM: CPT | Performed by: INTERNAL MEDICINE

## 2021-11-06 PROCEDURE — 99232 SBSQ HOSP IP/OBS MODERATE 35: CPT | Performed by: INTERNAL MEDICINE

## 2021-11-06 PROCEDURE — 94760 N-INVAS EAR/PLS OXIMETRY 1: CPT

## 2021-11-06 PROCEDURE — 94660 CPAP INITIATION&MGMT: CPT

## 2021-11-06 RX ADMIN — PANTOPRAZOLE SODIUM 40 MG: 40 TABLET, DELAYED RELEASE ORAL at 06:16

## 2021-11-06 RX ADMIN — METOPROLOL SUCCINATE 100 MG: 100 TABLET, EXTENDED RELEASE ORAL at 21:42

## 2021-11-06 RX ADMIN — METOPROLOL SUCCINATE 100 MG: 100 TABLET, EXTENDED RELEASE ORAL at 09:05

## 2021-11-06 RX ADMIN — Medication: at 09:07

## 2021-11-06 RX ADMIN — BUMETANIDE 2 MG: 2 TABLET ORAL at 09:06

## 2021-11-06 RX ADMIN — LEVETIRACETAM 1500 MG: 750 TABLET, FILM COATED ORAL at 21:42

## 2021-11-06 RX ADMIN — LEVETIRACETAM 1500 MG: 750 TABLET, FILM COATED ORAL at 09:05

## 2021-11-06 RX ADMIN — DILTIAZEM HYDROCHLORIDE 30 MG: 30 TABLET, FILM COATED ORAL at 23:24

## 2021-11-06 RX ADMIN — DILTIAZEM HYDROCHLORIDE 30 MG: 30 TABLET, FILM COATED ORAL at 00:20

## 2021-11-06 RX ADMIN — ASPIRIN 81 MG CHEWABLE TABLET 81 MG: 81 TABLET CHEWABLE at 09:05

## 2021-11-06 RX ADMIN — DILTIAZEM HYDROCHLORIDE 30 MG: 30 TABLET, FILM COATED ORAL at 06:16

## 2021-11-06 RX ADMIN — APIXABAN 5 MG: 5 TABLET, FILM COATED ORAL at 09:05

## 2021-11-06 RX ADMIN — APIXABAN 5 MG: 5 TABLET, FILM COATED ORAL at 17:19

## 2021-11-06 RX ADMIN — DILTIAZEM HYDROCHLORIDE 30 MG: 30 TABLET, FILM COATED ORAL at 17:20

## 2021-11-06 RX ADMIN — ATORVASTATIN CALCIUM 40 MG: 40 TABLET, FILM COATED ORAL at 17:19

## 2021-11-07 LAB
ANION GAP SERPL CALCULATED.3IONS-SCNC: 2 MMOL/L (ref 4–13)
ATRIAL RATE: 72 BPM
BASOPHILS # BLD AUTO: 0.04 THOUSANDS/ΜL (ref 0–0.1)
BASOPHILS NFR BLD AUTO: 0 % (ref 0–1)
BUN SERPL-MCNC: 15 MG/DL (ref 5–25)
CALCIUM SERPL-MCNC: 8.5 MG/DL (ref 8.3–10.1)
CHLORIDE SERPL-SCNC: 101 MMOL/L (ref 100–108)
CO2 SERPL-SCNC: 37 MMOL/L (ref 21–32)
CREAT SERPL-MCNC: 0.86 MG/DL (ref 0.6–1.3)
EOSINOPHIL # BLD AUTO: 0.75 THOUSAND/ΜL (ref 0–0.61)
EOSINOPHIL NFR BLD AUTO: 7 % (ref 0–6)
ERYTHROCYTE [DISTWIDTH] IN BLOOD BY AUTOMATED COUNT: 21.2 % (ref 11.6–15.1)
GFR SERPL CREATININE-BSD FRML MDRD: 93 ML/MIN/1.73SQ M
GLUCOSE SERPL-MCNC: 82 MG/DL (ref 65–140)
HCT VFR BLD AUTO: 34.7 % (ref 36.5–49.3)
HGB BLD-MCNC: 9.7 G/DL (ref 12–17)
IMM GRANULOCYTES # BLD AUTO: 0.19 THOUSAND/UL (ref 0–0.2)
IMM GRANULOCYTES NFR BLD AUTO: 2 % (ref 0–2)
LYMPHOCYTES # BLD AUTO: 0.99 THOUSANDS/ΜL (ref 0.6–4.47)
LYMPHOCYTES NFR BLD AUTO: 9 % (ref 14–44)
MAGNESIUM SERPL-MCNC: 1.8 MG/DL (ref 1.6–2.6)
MCH RBC QN AUTO: 22.1 PG (ref 26.8–34.3)
MCHC RBC AUTO-ENTMCNC: 28 G/DL (ref 31.4–37.4)
MCV RBC AUTO: 79 FL (ref 82–98)
MONOCYTES # BLD AUTO: 0.71 THOUSAND/ΜL (ref 0.17–1.22)
MONOCYTES NFR BLD AUTO: 7 % (ref 4–12)
NEUTROPHILS # BLD AUTO: 7.89 THOUSANDS/ΜL (ref 1.85–7.62)
NEUTS SEG NFR BLD AUTO: 75 % (ref 43–75)
NRBC BLD AUTO-RTO: 0 /100 WBCS
P AXIS: 50 DEGREES
PLATELET # BLD AUTO: 211 THOUSANDS/UL (ref 149–390)
POTASSIUM SERPL-SCNC: 3.1 MMOL/L (ref 3.5–5.3)
PR INTERVAL: 212 MS
QRS AXIS: 65 DEGREES
QRSD INTERVAL: 158 MS
QT INTERVAL: 434 MS
QTC INTERVAL: 475 MS
RBC # BLD AUTO: 4.38 MILLION/UL (ref 3.88–5.62)
SODIUM SERPL-SCNC: 140 MMOL/L (ref 136–145)
T WAVE AXIS: 79 DEGREES
VENTRICULAR RATE: 72 BPM
WBC # BLD AUTO: 10.57 THOUSAND/UL (ref 4.31–10.16)

## 2021-11-07 PROCEDURE — 93010 ELECTROCARDIOGRAM REPORT: CPT | Performed by: INTERNAL MEDICINE

## 2021-11-07 PROCEDURE — 93005 ELECTROCARDIOGRAM TRACING: CPT

## 2021-11-07 PROCEDURE — 99232 SBSQ HOSP IP/OBS MODERATE 35: CPT | Performed by: INTERNAL MEDICINE

## 2021-11-07 PROCEDURE — 80048 BASIC METABOLIC PNL TOTAL CA: CPT | Performed by: PHYSICIAN ASSISTANT

## 2021-11-07 PROCEDURE — 83735 ASSAY OF MAGNESIUM: CPT | Performed by: PHYSICIAN ASSISTANT

## 2021-11-07 PROCEDURE — 85025 COMPLETE CBC W/AUTO DIFF WBC: CPT | Performed by: PHYSICIAN ASSISTANT

## 2021-11-07 PROCEDURE — 94760 N-INVAS EAR/PLS OXIMETRY 1: CPT

## 2021-11-07 PROCEDURE — 94660 CPAP INITIATION&MGMT: CPT

## 2021-11-07 RX ORDER — DILTIAZEM HYDROCHLORIDE 60 MG/1
60 TABLET, FILM COATED ORAL EVERY 6 HOURS SCHEDULED
Status: DISCONTINUED | OUTPATIENT
Start: 2021-11-07 | End: 2021-11-08

## 2021-11-07 RX ORDER — POTASSIUM CHLORIDE 14.9 MG/ML
20 INJECTION INTRAVENOUS ONCE
Status: COMPLETED | OUTPATIENT
Start: 2021-11-07 | End: 2021-11-07

## 2021-11-07 RX ORDER — POTASSIUM CHLORIDE 20 MEQ/1
40 TABLET, EXTENDED RELEASE ORAL ONCE
Status: COMPLETED | OUTPATIENT
Start: 2021-11-07 | End: 2021-11-07

## 2021-11-07 RX ADMIN — ATORVASTATIN CALCIUM 40 MG: 40 TABLET, FILM COATED ORAL at 17:04

## 2021-11-07 RX ADMIN — LEVETIRACETAM 1500 MG: 750 TABLET, FILM COATED ORAL at 08:15

## 2021-11-07 RX ADMIN — LEVETIRACETAM 1500 MG: 750 TABLET, FILM COATED ORAL at 22:25

## 2021-11-07 RX ADMIN — PANTOPRAZOLE SODIUM 40 MG: 40 TABLET, DELAYED RELEASE ORAL at 05:51

## 2021-11-07 RX ADMIN — DILTIAZEM HYDROCHLORIDE 60 MG: 60 TABLET, FILM COATED ORAL at 20:37

## 2021-11-07 RX ADMIN — METOROPROLOL TARTRATE 5 MG: 5 INJECTION, SOLUTION INTRAVENOUS at 05:52

## 2021-11-07 RX ADMIN — METOPROLOL SUCCINATE 100 MG: 100 TABLET, EXTENDED RELEASE ORAL at 09:47

## 2021-11-07 RX ADMIN — DILTIAZEM HYDROCHLORIDE 60 MG: 60 TABLET, FILM COATED ORAL at 08:15

## 2021-11-07 RX ADMIN — POTASSIUM CHLORIDE 20 MEQ: 14.9 INJECTION, SOLUTION INTRAVENOUS at 08:00

## 2021-11-07 RX ADMIN — ASPIRIN 81 MG CHEWABLE TABLET 81 MG: 81 TABLET CHEWABLE at 08:16

## 2021-11-07 RX ADMIN — Medication: at 09:22

## 2021-11-07 RX ADMIN — APIXABAN 5 MG: 5 TABLET, FILM COATED ORAL at 08:16

## 2021-11-07 RX ADMIN — BUMETANIDE 2 MG: 2 TABLET ORAL at 08:17

## 2021-11-07 RX ADMIN — APIXABAN 5 MG: 5 TABLET, FILM COATED ORAL at 17:04

## 2021-11-07 RX ADMIN — POTASSIUM CHLORIDE 40 MEQ: 1500 TABLET, EXTENDED RELEASE ORAL at 05:51

## 2021-11-07 RX ADMIN — DILTIAZEM HYDROCHLORIDE 30 MG: 30 TABLET, FILM COATED ORAL at 01:50

## 2021-11-07 RX ADMIN — MAGNESIUM OXIDE TAB 400 MG (241.3 MG ELEMENTAL MG) 800 MG: 400 (241.3 MG) TAB at 05:51

## 2021-11-07 RX ADMIN — DILTIAZEM HYDROCHLORIDE 60 MG: 60 TABLET, FILM COATED ORAL at 14:32

## 2021-11-07 RX ADMIN — METOPROLOL SUCCINATE 100 MG: 100 TABLET, EXTENDED RELEASE ORAL at 22:25

## 2021-11-08 PROCEDURE — NC001 PR NO CHARGE: Performed by: PHYSICIAN ASSISTANT

## 2021-11-08 PROCEDURE — 99232 SBSQ HOSP IP/OBS MODERATE 35: CPT | Performed by: INTERNAL MEDICINE

## 2021-11-08 PROCEDURE — NC001 PR NO CHARGE: Performed by: INTERNAL MEDICINE

## 2021-11-08 RX ORDER — DILTIAZEM HYDROCHLORIDE 120 MG/1
120 CAPSULE, EXTENDED RELEASE ORAL EVERY 12 HOURS SCHEDULED
Status: DISCONTINUED | OUTPATIENT
Start: 2021-11-09 | End: 2021-11-09 | Stop reason: HOSPADM

## 2021-11-08 RX ORDER — DILTIAZEM HYDROCHLORIDE 60 MG/1
60 TABLET, FILM COATED ORAL EVERY 6 HOURS
Status: COMPLETED | OUTPATIENT
Start: 2021-11-08 | End: 2021-11-08

## 2021-11-08 RX ORDER — POTASSIUM CHLORIDE 20 MEQ/1
40 TABLET, EXTENDED RELEASE ORAL ONCE
Status: COMPLETED | OUTPATIENT
Start: 2021-11-08 | End: 2021-11-08

## 2021-11-08 RX ORDER — MAGNESIUM SULFATE HEPTAHYDRATE 40 MG/ML
2 INJECTION, SOLUTION INTRAVENOUS ONCE
Status: COMPLETED | OUTPATIENT
Start: 2021-11-08 | End: 2021-11-08

## 2021-11-08 RX ADMIN — PANTOPRAZOLE SODIUM 40 MG: 40 TABLET, DELAYED RELEASE ORAL at 05:29

## 2021-11-08 RX ADMIN — METOPROLOL SUCCINATE 100 MG: 100 TABLET, EXTENDED RELEASE ORAL at 08:12

## 2021-11-08 RX ADMIN — LEVETIRACETAM 1500 MG: 750 TABLET, FILM COATED ORAL at 21:27

## 2021-11-08 RX ADMIN — DILTIAZEM HYDROCHLORIDE 60 MG: 60 TABLET, FILM COATED ORAL at 08:12

## 2021-11-08 RX ADMIN — APIXABAN 5 MG: 5 TABLET, FILM COATED ORAL at 08:12

## 2021-11-08 RX ADMIN — BUMETANIDE 2 MG: 2 TABLET ORAL at 08:12

## 2021-11-08 RX ADMIN — DILTIAZEM HYDROCHLORIDE 60 MG: 60 TABLET, FILM COATED ORAL at 01:23

## 2021-11-08 RX ADMIN — POTASSIUM CHLORIDE 40 MEQ: 1500 TABLET, EXTENDED RELEASE ORAL at 08:19

## 2021-11-08 RX ADMIN — METOPROLOL SUCCINATE 100 MG: 100 TABLET, EXTENDED RELEASE ORAL at 21:30

## 2021-11-08 RX ADMIN — DILTIAZEM HYDROCHLORIDE 60 MG: 60 TABLET, FILM COATED ORAL at 16:35

## 2021-11-08 RX ADMIN — APIXABAN 5 MG: 5 TABLET, FILM COATED ORAL at 17:37

## 2021-11-08 RX ADMIN — DILTIAZEM HYDROCHLORIDE 60 MG: 60 TABLET, FILM COATED ORAL at 21:30

## 2021-11-08 RX ADMIN — MAGNESIUM SULFATE HEPTAHYDRATE 2 G: 40 INJECTION, SOLUTION INTRAVENOUS at 08:21

## 2021-11-08 RX ADMIN — ASPIRIN 81 MG CHEWABLE TABLET 81 MG: 81 TABLET CHEWABLE at 08:12

## 2021-11-08 RX ADMIN — ATORVASTATIN CALCIUM 40 MG: 40 TABLET, FILM COATED ORAL at 16:35

## 2021-11-08 RX ADMIN — LEVETIRACETAM 1500 MG: 750 TABLET, FILM COATED ORAL at 08:12

## 2021-11-08 RX ADMIN — Medication: at 08:13

## 2021-11-09 VITALS
SYSTOLIC BLOOD PRESSURE: 99 MMHG | BODY MASS INDEX: 44.1 KG/M2 | DIASTOLIC BLOOD PRESSURE: 61 MMHG | WEIGHT: 315 LBS | HEART RATE: 64 BPM | OXYGEN SATURATION: 94 % | HEIGHT: 71 IN | RESPIRATION RATE: 19 BRPM | TEMPERATURE: 97.8 F

## 2021-11-09 LAB
ANION GAP SERPL CALCULATED.3IONS-SCNC: 5 MMOL/L (ref 4–13)
BASOPHILS # BLD AUTO: 0.08 THOUSANDS/ΜL (ref 0–0.1)
BASOPHILS NFR BLD AUTO: 1 % (ref 0–1)
BUN SERPL-MCNC: 14 MG/DL (ref 5–25)
CALCIUM SERPL-MCNC: 8.4 MG/DL (ref 8.3–10.1)
CHLORIDE SERPL-SCNC: 102 MMOL/L (ref 100–108)
CO2 SERPL-SCNC: 34 MMOL/L (ref 21–32)
CREAT SERPL-MCNC: 1.15 MG/DL (ref 0.6–1.3)
EOSINOPHIL # BLD AUTO: 0.67 THOUSAND/ΜL (ref 0–0.61)
EOSINOPHIL NFR BLD AUTO: 6 % (ref 0–6)
ERYTHROCYTE [DISTWIDTH] IN BLOOD BY AUTOMATED COUNT: 21.4 % (ref 11.6–15.1)
FLUAV RNA RESP QL NAA+PROBE: NEGATIVE
FLUBV RNA RESP QL NAA+PROBE: NEGATIVE
GFR SERPL CREATININE-BSD FRML MDRD: 68 ML/MIN/1.73SQ M
GLUCOSE SERPL-MCNC: 97 MG/DL (ref 65–140)
HCT VFR BLD AUTO: 36.7 % (ref 36.5–49.3)
HGB BLD-MCNC: 10 G/DL (ref 12–17)
IMM GRANULOCYTES # BLD AUTO: 0.16 THOUSAND/UL (ref 0–0.2)
IMM GRANULOCYTES NFR BLD AUTO: 2 % (ref 0–2)
LYMPHOCYTES # BLD AUTO: 0.95 THOUSANDS/ΜL (ref 0.6–4.47)
LYMPHOCYTES NFR BLD AUTO: 9 % (ref 14–44)
MCH RBC QN AUTO: 22.2 PG (ref 26.8–34.3)
MCHC RBC AUTO-ENTMCNC: 27.2 G/DL (ref 31.4–37.4)
MCV RBC AUTO: 81 FL (ref 82–98)
MONOCYTES # BLD AUTO: 0.83 THOUSAND/ΜL (ref 0.17–1.22)
MONOCYTES NFR BLD AUTO: 8 % (ref 4–12)
NEUTROPHILS # BLD AUTO: 7.86 THOUSANDS/ΜL (ref 1.85–7.62)
NEUTS SEG NFR BLD AUTO: 74 % (ref 43–75)
NRBC BLD AUTO-RTO: 0 /100 WBCS
PLATELET # BLD AUTO: 174 THOUSANDS/UL (ref 149–390)
PMV BLD AUTO: 10.7 FL (ref 8.9–12.7)
POTASSIUM SERPL-SCNC: 2.6 MMOL/L (ref 3.5–5.3)
POTASSIUM SERPL-SCNC: 3.4 MMOL/L (ref 3.5–5.3)
RBC # BLD AUTO: 4.51 MILLION/UL (ref 3.88–5.62)
RSV RNA RESP QL NAA+PROBE: NEGATIVE
SARS-COV-2 RNA RESP QL NAA+PROBE: NEGATIVE
SODIUM SERPL-SCNC: 141 MMOL/L (ref 136–145)
WBC # BLD AUTO: 10.55 THOUSAND/UL (ref 4.31–10.16)

## 2021-11-09 PROCEDURE — 85025 COMPLETE CBC W/AUTO DIFF WBC: CPT | Performed by: INTERNAL MEDICINE

## 2021-11-09 PROCEDURE — 99239 HOSP IP/OBS DSCHRG MGMT >30: CPT | Performed by: INTERNAL MEDICINE

## 2021-11-09 PROCEDURE — 84132 ASSAY OF SERUM POTASSIUM: CPT

## 2021-11-09 PROCEDURE — 80048 BASIC METABOLIC PNL TOTAL CA: CPT | Performed by: INTERNAL MEDICINE

## 2021-11-09 PROCEDURE — 0241U HB NFCT DS VIR RESP RNA 4 TRGT: CPT | Performed by: INTERNAL MEDICINE

## 2021-11-09 RX ORDER — LIDOCAINE 50 MG/G
2 PATCH TOPICAL DAILY
Refills: 0
Start: 2021-11-10

## 2021-11-09 RX ORDER — POTASSIUM CHLORIDE 14.9 MG/ML
20 INJECTION INTRAVENOUS
Status: COMPLETED | OUTPATIENT
Start: 2021-11-09 | End: 2021-11-09

## 2021-11-09 RX ORDER — METOPROLOL SUCCINATE 100 MG/1
100 TABLET, EXTENDED RELEASE ORAL 2 TIMES DAILY
Refills: 0
Start: 2021-11-09

## 2021-11-09 RX ORDER — MAGNESIUM SULFATE 1 G/100ML
1 INJECTION INTRAVENOUS ONCE
Status: COMPLETED | OUTPATIENT
Start: 2021-11-09 | End: 2021-11-09

## 2021-11-09 RX ORDER — POTASSIUM CHLORIDE 20 MEQ/1
40 TABLET, EXTENDED RELEASE ORAL ONCE
Status: COMPLETED | OUTPATIENT
Start: 2021-11-09 | End: 2021-11-09

## 2021-11-09 RX ORDER — LEVETIRACETAM 750 MG/1
1500 TABLET ORAL EVERY 12 HOURS SCHEDULED
Refills: 0
Start: 2021-11-09

## 2021-11-09 RX ORDER — DILTIAZEM HYDROCHLORIDE 120 MG/1
120 CAPSULE, EXTENDED RELEASE ORAL EVERY 12 HOURS SCHEDULED
Refills: 0
Start: 2021-11-09

## 2021-11-09 RX ORDER — ACETAMINOPHEN 325 MG/1
650 TABLET ORAL EVERY 6 HOURS PRN
Refills: 0
Start: 2021-11-09

## 2021-11-09 RX ADMIN — PANTOPRAZOLE SODIUM 40 MG: 40 TABLET, DELAYED RELEASE ORAL at 07:07

## 2021-11-09 RX ADMIN — ASPIRIN 81 MG CHEWABLE TABLET 81 MG: 81 TABLET CHEWABLE at 09:24

## 2021-11-09 RX ADMIN — ATORVASTATIN CALCIUM 40 MG: 40 TABLET, FILM COATED ORAL at 16:52

## 2021-11-09 RX ADMIN — POTASSIUM CHLORIDE 40 MEQ: 1500 TABLET, EXTENDED RELEASE ORAL at 09:24

## 2021-11-09 RX ADMIN — Medication 1 APPLICATION: at 09:31

## 2021-11-09 RX ADMIN — POTASSIUM CHLORIDE 20 MEQ: 14.9 INJECTION, SOLUTION INTRAVENOUS at 09:23

## 2021-11-09 RX ADMIN — BUMETANIDE 2 MG: 2 TABLET ORAL at 09:25

## 2021-11-09 RX ADMIN — LEVETIRACETAM 1500 MG: 750 TABLET, FILM COATED ORAL at 09:24

## 2021-11-09 RX ADMIN — POTASSIUM CHLORIDE 20 MEQ: 14.9 INJECTION, SOLUTION INTRAVENOUS at 12:28

## 2021-11-09 RX ADMIN — APIXABAN 5 MG: 5 TABLET, FILM COATED ORAL at 09:23

## 2021-11-09 RX ADMIN — MAGNESIUM SULFATE HEPTAHYDRATE 1 G: 1 INJECTION, SOLUTION INTRAVENOUS at 16:51

## 2021-11-11 ENCOUNTER — PATIENT OUTREACH (OUTPATIENT)
Dept: CASE MANAGEMENT | Facility: OTHER | Age: 61
End: 2021-11-11

## 2021-11-15 ENCOUNTER — PATIENT OUTREACH (OUTPATIENT)
Dept: CASE MANAGEMENT | Facility: OTHER | Age: 61
End: 2021-11-15

## 2021-11-17 ENCOUNTER — HOSPITAL ENCOUNTER (INPATIENT)
Facility: HOSPITAL | Age: 61
LOS: 1 days | Discharge: NURSING, REHAB OR LONG-TERM CARE | DRG: 189 | End: 2021-11-19
Attending: EMERGENCY MEDICINE | Admitting: FAMILY MEDICINE
Payer: MEDICARE

## 2021-11-17 ENCOUNTER — APPOINTMENT (EMERGENCY)
Dept: CT IMAGING | Facility: HOSPITAL | Age: 61
DRG: 189 | End: 2021-11-17
Payer: MEDICARE

## 2021-11-17 DIAGNOSIS — I50.32 CHRONIC DIASTOLIC HEART FAILURE (HCC): ICD-10-CM

## 2021-11-17 DIAGNOSIS — J96.21 ACUTE ON CHRONIC RESPIRATORY FAILURE WITH HYPOXIA AND HYPERCAPNIA (HCC): ICD-10-CM

## 2021-11-17 DIAGNOSIS — R60.9 EDEMA: ICD-10-CM

## 2021-11-17 DIAGNOSIS — I50.9 CHF (CONGESTIVE HEART FAILURE) (HCC): ICD-10-CM

## 2021-11-17 DIAGNOSIS — R09.02 HYPOXIA: Primary | ICD-10-CM

## 2021-11-17 DIAGNOSIS — R06.00 DYSPNEA: ICD-10-CM

## 2021-11-17 DIAGNOSIS — J96.22 ACUTE ON CHRONIC RESPIRATORY FAILURE WITH HYPOXIA AND HYPERCAPNIA (HCC): ICD-10-CM

## 2021-11-17 LAB
2HR DELTA HS TROPONIN: 0 NG/L
ALBUMIN SERPL BCP-MCNC: 2.8 G/DL (ref 3.5–5)
ALP SERPL-CCNC: 77 U/L (ref 46–116)
ALT SERPL W P-5'-P-CCNC: 20 U/L (ref 12–78)
ANION GAP SERPL CALCULATED.3IONS-SCNC: 3 MMOL/L (ref 4–13)
APTT PPP: 30 SECONDS (ref 23–37)
AST SERPL W P-5'-P-CCNC: 17 U/L (ref 5–45)
BASE EX.OXY STD BLDV CALC-SCNC: 67.3 % (ref 60–80)
BASE EXCESS BLDV CALC-SCNC: 7.7 MMOL/L
BASOPHILS # BLD AUTO: 0.04 THOUSANDS/ΜL (ref 0–0.1)
BASOPHILS NFR BLD AUTO: 0 % (ref 0–1)
BILIRUB SERPL-MCNC: 0.25 MG/DL (ref 0.2–1)
BUN SERPL-MCNC: 26 MG/DL (ref 5–25)
CALCIUM ALBUM COR SERPL-MCNC: 9.5 MG/DL (ref 8.3–10.1)
CALCIUM SERPL-MCNC: 8.5 MG/DL (ref 8.3–10.1)
CARDIAC TROPONIN I PNL SERPL HS: 14 NG/L
CARDIAC TROPONIN I PNL SERPL HS: 14 NG/L
CHLORIDE SERPL-SCNC: 103 MMOL/L (ref 100–108)
CO2 SERPL-SCNC: 38 MMOL/L (ref 21–32)
CREAT SERPL-MCNC: 1.47 MG/DL (ref 0.6–1.3)
D DIMER PPP FEU-MCNC: 0.54 UG/ML FEU
EOSINOPHIL # BLD AUTO: 1.07 THOUSAND/ΜL (ref 0–0.61)
EOSINOPHIL NFR BLD AUTO: 12 % (ref 0–6)
ERYTHROCYTE [DISTWIDTH] IN BLOOD BY AUTOMATED COUNT: 20.9 % (ref 11.6–15.1)
FLUAV RNA RESP QL NAA+PROBE: NEGATIVE
FLUBV RNA RESP QL NAA+PROBE: NEGATIVE
GFR SERPL CREATININE-BSD FRML MDRD: 50 ML/MIN/1.73SQ M
GLUCOSE SERPL-MCNC: 109 MG/DL (ref 65–140)
HCO3 BLDV-SCNC: 36.7 MMOL/L (ref 24–30)
HCT VFR BLD AUTO: 33.6 % (ref 36.5–49.3)
HGB BLD-MCNC: 9.3 G/DL (ref 12–17)
IMM GRANULOCYTES # BLD AUTO: 0.04 THOUSAND/UL (ref 0–0.2)
IMM GRANULOCYTES NFR BLD AUTO: 0 % (ref 0–2)
INR PPP: 1.22 (ref 0.84–1.19)
LACTATE SERPL-SCNC: 1 MMOL/L (ref 0.5–2)
LYMPHOCYTES # BLD AUTO: 0.73 THOUSANDS/ΜL (ref 0.6–4.47)
LYMPHOCYTES NFR BLD AUTO: 8 % (ref 14–44)
MCH RBC QN AUTO: 22.7 PG (ref 26.8–34.3)
MCHC RBC AUTO-ENTMCNC: 27.7 G/DL (ref 31.4–37.4)
MCV RBC AUTO: 82 FL (ref 82–98)
MONOCYTES # BLD AUTO: 0.42 THOUSAND/ΜL (ref 0.17–1.22)
MONOCYTES NFR BLD AUTO: 5 % (ref 4–12)
NEUTROPHILS # BLD AUTO: 6.84 THOUSANDS/ΜL (ref 1.85–7.62)
NEUTS SEG NFR BLD AUTO: 75 % (ref 43–75)
NRBC BLD AUTO-RTO: 0 /100 WBCS
NT-PROBNP SERPL-MCNC: 3536 PG/ML
O2 CT BLDV-SCNC: 9.8 ML/DL
PCO2 BLDV: 81.2 MM HG (ref 42–50)
PH BLDV: 7.27 [PH] (ref 7.3–7.4)
PLATELET # BLD AUTO: 234 THOUSANDS/UL (ref 149–390)
PMV BLD AUTO: 11.4 FL (ref 8.9–12.7)
PO2 BLDV: 41.2 MM HG (ref 35–45)
POTASSIUM SERPL-SCNC: 3.2 MMOL/L (ref 3.5–5.3)
PROT SERPL-MCNC: 7.1 G/DL (ref 6.4–8.2)
PROTHROMBIN TIME: 14.7 SECONDS (ref 11.6–14.5)
RBC # BLD AUTO: 4.1 MILLION/UL (ref 3.88–5.62)
RSV RNA RESP QL NAA+PROBE: NEGATIVE
SARS-COV-2 RNA RESP QL NAA+PROBE: NEGATIVE
SODIUM SERPL-SCNC: 144 MMOL/L (ref 136–145)
WBC # BLD AUTO: 9.14 THOUSAND/UL (ref 4.31–10.16)

## 2021-11-17 PROCEDURE — 84145 PROCALCITONIN (PCT): CPT | Performed by: EMERGENCY MEDICINE

## 2021-11-17 PROCEDURE — 83605 ASSAY OF LACTIC ACID: CPT | Performed by: EMERGENCY MEDICINE

## 2021-11-17 PROCEDURE — 80053 COMPREHEN METABOLIC PANEL: CPT | Performed by: EMERGENCY MEDICINE

## 2021-11-17 PROCEDURE — G1004 CDSM NDSC: HCPCS

## 2021-11-17 PROCEDURE — 82805 BLOOD GASES W/O2 SATURATION: CPT | Performed by: EMERGENCY MEDICINE

## 2021-11-17 PROCEDURE — 85730 THROMBOPLASTIN TIME PARTIAL: CPT | Performed by: EMERGENCY MEDICINE

## 2021-11-17 PROCEDURE — 93005 ELECTROCARDIOGRAM TRACING: CPT

## 2021-11-17 PROCEDURE — 0241U HB NFCT DS VIR RESP RNA 4 TRGT: CPT | Performed by: EMERGENCY MEDICINE

## 2021-11-17 PROCEDURE — 87040 BLOOD CULTURE FOR BACTERIA: CPT | Performed by: EMERGENCY MEDICINE

## 2021-11-17 PROCEDURE — 71260 CT THORAX DX C+: CPT

## 2021-11-17 PROCEDURE — 94640 AIRWAY INHALATION TREATMENT: CPT

## 2021-11-17 PROCEDURE — 84484 ASSAY OF TROPONIN QUANT: CPT | Performed by: EMERGENCY MEDICINE

## 2021-11-17 PROCEDURE — 99285 EMERGENCY DEPT VISIT HI MDM: CPT | Performed by: EMERGENCY MEDICINE

## 2021-11-17 PROCEDURE — 85025 COMPLETE CBC W/AUTO DIFF WBC: CPT | Performed by: EMERGENCY MEDICINE

## 2021-11-17 PROCEDURE — 83880 ASSAY OF NATRIURETIC PEPTIDE: CPT | Performed by: EMERGENCY MEDICINE

## 2021-11-17 PROCEDURE — 85610 PROTHROMBIN TIME: CPT | Performed by: EMERGENCY MEDICINE

## 2021-11-17 PROCEDURE — 99285 EMERGENCY DEPT VISIT HI MDM: CPT

## 2021-11-17 PROCEDURE — 85379 FIBRIN DEGRADATION QUANT: CPT | Performed by: EMERGENCY MEDICINE

## 2021-11-17 PROCEDURE — 36415 COLL VENOUS BLD VENIPUNCTURE: CPT | Performed by: EMERGENCY MEDICINE

## 2021-11-17 RX ADMIN — IOHEXOL 85 ML: 350 INJECTION, SOLUTION INTRAVENOUS at 21:37

## 2021-11-18 ENCOUNTER — PATIENT OUTREACH (OUTPATIENT)
Dept: CASE MANAGEMENT | Facility: OTHER | Age: 61
End: 2021-11-18

## 2021-11-18 LAB
4HR DELTA HS TROPONIN: 0 NG/L
ANION GAP SERPL CALCULATED.3IONS-SCNC: 2 MMOL/L (ref 4–13)
BUN SERPL-MCNC: 21 MG/DL (ref 5–25)
CALCIUM SERPL-MCNC: 8.2 MG/DL (ref 8.3–10.1)
CARDIAC TROPONIN I PNL SERPL HS: 14 NG/L
CHLORIDE SERPL-SCNC: 101 MMOL/L (ref 100–108)
CO2 SERPL-SCNC: 39 MMOL/L (ref 21–32)
CREAT SERPL-MCNC: 1.28 MG/DL (ref 0.6–1.3)
ERYTHROCYTE [DISTWIDTH] IN BLOOD BY AUTOMATED COUNT: 20.7 % (ref 11.6–15.1)
GFR SERPL CREATININE-BSD FRML MDRD: 60 ML/MIN/1.73SQ M
GLUCOSE SERPL-MCNC: 94 MG/DL (ref 65–140)
HCT VFR BLD AUTO: 30.3 % (ref 36.5–49.3)
HGB BLD-MCNC: 8.5 G/DL (ref 12–17)
MCH RBC QN AUTO: 22.8 PG (ref 26.8–34.3)
MCHC RBC AUTO-ENTMCNC: 28.1 G/DL (ref 31.4–37.4)
MCV RBC AUTO: 81 FL (ref 82–98)
PLATELET # BLD AUTO: 206 THOUSANDS/UL (ref 149–390)
PMV BLD AUTO: 10.7 FL (ref 8.9–12.7)
POTASSIUM SERPL-SCNC: 3 MMOL/L (ref 3.5–5.3)
PROCALCITONIN SERPL-MCNC: 0.06 NG/ML
RBC # BLD AUTO: 3.73 MILLION/UL (ref 3.88–5.62)
SODIUM SERPL-SCNC: 142 MMOL/L (ref 136–145)
WBC # BLD AUTO: 8.7 THOUSAND/UL (ref 4.31–10.16)

## 2021-11-18 PROCEDURE — 94668 MNPJ CHEST WALL SBSQ: CPT

## 2021-11-18 PROCEDURE — 94660 CPAP INITIATION&MGMT: CPT

## 2021-11-18 PROCEDURE — 99222 1ST HOSP IP/OBS MODERATE 55: CPT | Performed by: PHYSICIAN ASSISTANT

## 2021-11-18 PROCEDURE — 99223 1ST HOSP IP/OBS HIGH 75: CPT | Performed by: INTERNAL MEDICINE

## 2021-11-18 PROCEDURE — 84484 ASSAY OF TROPONIN QUANT: CPT | Performed by: EMERGENCY MEDICINE

## 2021-11-18 PROCEDURE — 94664 DEMO&/EVAL PT USE INHALER: CPT

## 2021-11-18 PROCEDURE — 99219 PR INITIAL OBSERVATION CARE/DAY 50 MINUTES: CPT | Performed by: FAMILY MEDICINE

## 2021-11-18 PROCEDURE — 80048 BASIC METABOLIC PNL TOTAL CA: CPT | Performed by: PHYSICIAN ASSISTANT

## 2021-11-18 PROCEDURE — 94640 AIRWAY INHALATION TREATMENT: CPT

## 2021-11-18 PROCEDURE — 94760 N-INVAS EAR/PLS OXIMETRY 1: CPT

## 2021-11-18 PROCEDURE — 85027 COMPLETE CBC AUTOMATED: CPT | Performed by: PHYSICIAN ASSISTANT

## 2021-11-18 PROCEDURE — 36415 COLL VENOUS BLD VENIPUNCTURE: CPT | Performed by: EMERGENCY MEDICINE

## 2021-11-18 RX ORDER — PANTOPRAZOLE SODIUM 40 MG/1
40 TABLET, DELAYED RELEASE ORAL
Status: DISCONTINUED | OUTPATIENT
Start: 2021-11-18 | End: 2021-11-19 | Stop reason: HOSPADM

## 2021-11-18 RX ORDER — SERTRALINE HYDROCHLORIDE 100 MG/1
100 TABLET, FILM COATED ORAL
Status: DISCONTINUED | OUTPATIENT
Start: 2021-11-18 | End: 2021-11-19 | Stop reason: HOSPADM

## 2021-11-18 RX ORDER — ALLOPURINOL 100 MG/1
100 TABLET ORAL DAILY
Status: DISCONTINUED | OUTPATIENT
Start: 2021-11-18 | End: 2021-11-19 | Stop reason: HOSPADM

## 2021-11-18 RX ORDER — SERTRALINE HYDROCHLORIDE 25 MG/1
25 TABLET, FILM COATED ORAL
Status: DISCONTINUED | OUTPATIENT
Start: 2021-11-18 | End: 2021-11-19 | Stop reason: HOSPADM

## 2021-11-18 RX ORDER — BUMETANIDE 0.25 MG/ML
2 INJECTION, SOLUTION INTRAMUSCULAR; INTRAVENOUS ONCE
Status: COMPLETED | OUTPATIENT
Start: 2021-11-18 | End: 2021-11-18

## 2021-11-18 RX ORDER — IPRATROPIUM BROMIDE AND ALBUTEROL SULFATE 2.5; .5 MG/3ML; MG/3ML
3 SOLUTION RESPIRATORY (INHALATION)
Status: DISCONTINUED | OUTPATIENT
Start: 2021-11-18 | End: 2021-11-18

## 2021-11-18 RX ORDER — METOPROLOL SUCCINATE 100 MG/1
100 TABLET, EXTENDED RELEASE ORAL 2 TIMES DAILY
Status: DISCONTINUED | OUTPATIENT
Start: 2021-11-18 | End: 2021-11-19 | Stop reason: HOSPADM

## 2021-11-18 RX ORDER — ALBUTEROL SULFATE 90 UG/1
2 AEROSOL, METERED RESPIRATORY (INHALATION) EVERY 6 HOURS PRN
Status: DISCONTINUED | OUTPATIENT
Start: 2021-11-18 | End: 2021-11-19 | Stop reason: HOSPADM

## 2021-11-18 RX ORDER — POTASSIUM CHLORIDE 20 MEQ/1
20 TABLET, EXTENDED RELEASE ORAL ONCE
Status: COMPLETED | OUTPATIENT
Start: 2021-11-18 | End: 2021-11-18

## 2021-11-18 RX ORDER — GUAIFENESIN 600 MG
600 TABLET, EXTENDED RELEASE 12 HR ORAL EVERY 12 HOURS SCHEDULED
Status: DISCONTINUED | OUTPATIENT
Start: 2021-11-18 | End: 2021-11-19 | Stop reason: HOSPADM

## 2021-11-18 RX ORDER — POTASSIUM CHLORIDE 20 MEQ/1
20 TABLET, EXTENDED RELEASE ORAL 2 TIMES DAILY
Status: COMPLETED | OUTPATIENT
Start: 2021-11-18 | End: 2021-11-18

## 2021-11-18 RX ORDER — ATORVASTATIN CALCIUM 40 MG/1
40 TABLET, FILM COATED ORAL
Status: DISCONTINUED | OUTPATIENT
Start: 2021-11-18 | End: 2021-11-19 | Stop reason: HOSPADM

## 2021-11-18 RX ORDER — LEVALBUTEROL 1.25 MG/.5ML
1.25 SOLUTION, CONCENTRATE RESPIRATORY (INHALATION)
Status: DISCONTINUED | OUTPATIENT
Start: 2021-11-18 | End: 2021-11-19 | Stop reason: HOSPADM

## 2021-11-18 RX ORDER — ACETAMINOPHEN 325 MG/1
650 TABLET ORAL EVERY 6 HOURS PRN
Status: DISCONTINUED | OUTPATIENT
Start: 2021-11-18 | End: 2021-11-19 | Stop reason: HOSPADM

## 2021-11-18 RX ORDER — DILTIAZEM HYDROCHLORIDE 60 MG/1
120 CAPSULE, EXTENDED RELEASE ORAL EVERY 12 HOURS SCHEDULED
Status: DISCONTINUED | OUTPATIENT
Start: 2021-11-18 | End: 2021-11-19 | Stop reason: HOSPADM

## 2021-11-18 RX ORDER — LEVETIRACETAM 500 MG/1
1500 TABLET ORAL EVERY 12 HOURS SCHEDULED
Status: DISCONTINUED | OUTPATIENT
Start: 2021-11-18 | End: 2021-11-19 | Stop reason: HOSPADM

## 2021-11-18 RX ORDER — ASPIRIN 81 MG/1
81 TABLET, CHEWABLE ORAL EVERY 24 HOURS
Status: DISCONTINUED | OUTPATIENT
Start: 2021-11-18 | End: 2021-11-19 | Stop reason: HOSPADM

## 2021-11-18 RX ADMIN — METOPROLOL SUCCINATE 100 MG: 100 TABLET, EXTENDED RELEASE ORAL at 08:03

## 2021-11-18 RX ADMIN — LEVALBUTEROL 1.25 MG: 1.25 SOLUTION, CONCENTRATE RESPIRATORY (INHALATION) at 08:07

## 2021-11-18 RX ADMIN — PANTOPRAZOLE SODIUM 40 MG: 40 TABLET, DELAYED RELEASE ORAL at 05:55

## 2021-11-18 RX ADMIN — APIXABAN 5 MG: 5 TABLET, FILM COATED ORAL at 08:04

## 2021-11-18 RX ADMIN — LEVETIRACETAM 1500 MG: 500 TABLET ORAL at 02:47

## 2021-11-18 RX ADMIN — ALLOPURINOL 100 MG: 100 TABLET ORAL at 08:03

## 2021-11-18 RX ADMIN — APIXABAN 5 MG: 5 TABLET, FILM COATED ORAL at 17:00

## 2021-11-18 RX ADMIN — IPRATROPIUM BROMIDE 0.5 MG: 0.5 SOLUTION RESPIRATORY (INHALATION) at 08:07

## 2021-11-18 RX ADMIN — METOPROLOL SUCCINATE 100 MG: 100 TABLET, EXTENDED RELEASE ORAL at 02:49

## 2021-11-18 RX ADMIN — SERTRALINE 25 MG: 25 TABLET, FILM COATED ORAL at 17:00

## 2021-11-18 RX ADMIN — PANTOPRAZOLE SODIUM 40 MG: 40 TABLET, DELAYED RELEASE ORAL at 16:59

## 2021-11-18 RX ADMIN — ASPIRIN 81 MG CHEWABLE TABLET 81 MG: 81 TABLET CHEWABLE at 02:49

## 2021-11-18 RX ADMIN — POTASSIUM CHLORIDE 20 MEQ: 1500 TABLET, EXTENDED RELEASE ORAL at 00:32

## 2021-11-18 RX ADMIN — LEVETIRACETAM 1500 MG: 500 TABLET ORAL at 22:28

## 2021-11-18 RX ADMIN — SERTRALINE 100 MG: 100 TABLET, FILM COATED ORAL at 22:28

## 2021-11-18 RX ADMIN — BUMETANIDE 2 MG: 0.25 INJECTION INTRAMUSCULAR; INTRAVENOUS at 01:16

## 2021-11-18 RX ADMIN — DILTIAZEM HYDROCHLORIDE 120 MG: 60 CAPSULE, EXTENDED RELEASE ORAL at 02:50

## 2021-11-18 RX ADMIN — ATORVASTATIN CALCIUM 40 MG: 40 TABLET, FILM COATED ORAL at 16:59

## 2021-11-18 RX ADMIN — BUMETANIDE 2 MG: 0.25 INJECTION, SOLUTION INTRAMUSCULAR; INTRAVENOUS at 12:17

## 2021-11-18 RX ADMIN — GUAIFENESIN 600 MG: 600 TABLET ORAL at 22:28

## 2021-11-18 RX ADMIN — POTASSIUM CHLORIDE 20 MEQ: 1500 TABLET, EXTENDED RELEASE ORAL at 17:00

## 2021-11-18 RX ADMIN — SERTRALINE 100 MG: 100 TABLET, FILM COATED ORAL at 02:49

## 2021-11-18 RX ADMIN — LEVETIRACETAM 1500 MG: 500 TABLET ORAL at 08:06

## 2021-11-18 RX ADMIN — DILTIAZEM HYDROCHLORIDE 120 MG: 60 CAPSULE, EXTENDED RELEASE ORAL at 08:04

## 2021-11-18 RX ADMIN — ACETAMINOPHEN 650 MG: 325 TABLET, FILM COATED ORAL at 20:23

## 2021-11-18 RX ADMIN — LEVALBUTEROL 1.25 MG: 1.25 SOLUTION, CONCENTRATE RESPIRATORY (INHALATION) at 14:10

## 2021-11-18 RX ADMIN — POTASSIUM CHLORIDE 20 MEQ: 1500 TABLET, EXTENDED RELEASE ORAL at 12:18

## 2021-11-18 RX ADMIN — IPRATROPIUM BROMIDE 0.5 MG: 0.5 SOLUTION RESPIRATORY (INHALATION) at 14:10

## 2021-11-19 VITALS
HEIGHT: 72 IN | OXYGEN SATURATION: 100 % | TEMPERATURE: 97.8 F | RESPIRATION RATE: 18 BRPM | SYSTOLIC BLOOD PRESSURE: 116 MMHG | DIASTOLIC BLOOD PRESSURE: 62 MMHG | HEART RATE: 63 BPM | WEIGHT: 315 LBS | BODY MASS INDEX: 42.66 KG/M2

## 2021-11-19 LAB
ANION GAP SERPL CALCULATED.3IONS-SCNC: 1 MMOL/L (ref 4–13)
BASOPHILS # BLD AUTO: 0.05 THOUSANDS/ΜL (ref 0–0.1)
BASOPHILS NFR BLD AUTO: 1 % (ref 0–1)
BUN SERPL-MCNC: 28 MG/DL (ref 5–25)
CALCIUM SERPL-MCNC: 8.5 MG/DL (ref 8.3–10.1)
CHLORIDE SERPL-SCNC: 101 MMOL/L (ref 100–108)
CO2 SERPL-SCNC: 38 MMOL/L (ref 21–32)
CREAT SERPL-MCNC: 1.52 MG/DL (ref 0.6–1.3)
EOSINOPHIL # BLD AUTO: 0.88 THOUSAND/ΜL (ref 0–0.61)
EOSINOPHIL NFR BLD AUTO: 9 % (ref 0–6)
ERYTHROCYTE [DISTWIDTH] IN BLOOD BY AUTOMATED COUNT: 20.9 % (ref 11.6–15.1)
FLUAV RNA RESP QL NAA+PROBE: NEGATIVE
FLUBV RNA RESP QL NAA+PROBE: NEGATIVE
GFR SERPL CREATININE-BSD FRML MDRD: 48 ML/MIN/1.73SQ M
GLUCOSE SERPL-MCNC: 101 MG/DL (ref 65–140)
HCT VFR BLD AUTO: 30.1 % (ref 36.5–49.3)
HGB BLD-MCNC: 8.5 G/DL (ref 12–17)
IMM GRANULOCYTES # BLD AUTO: 0.03 THOUSAND/UL (ref 0–0.2)
IMM GRANULOCYTES NFR BLD AUTO: 0 % (ref 0–2)
LYMPHOCYTES # BLD AUTO: 0.66 THOUSANDS/ΜL (ref 0.6–4.47)
LYMPHOCYTES NFR BLD AUTO: 7 % (ref 14–44)
MCH RBC QN AUTO: 23 PG (ref 26.8–34.3)
MCHC RBC AUTO-ENTMCNC: 28.2 G/DL (ref 31.4–37.4)
MCV RBC AUTO: 82 FL (ref 82–98)
MONOCYTES # BLD AUTO: 0.56 THOUSAND/ΜL (ref 0.17–1.22)
MONOCYTES NFR BLD AUTO: 6 % (ref 4–12)
NEUTROPHILS # BLD AUTO: 7.21 THOUSANDS/ΜL (ref 1.85–7.62)
NEUTS SEG NFR BLD AUTO: 77 % (ref 43–75)
NRBC BLD AUTO-RTO: 0 /100 WBCS
PLATELET # BLD AUTO: 204 THOUSANDS/UL (ref 149–390)
PMV BLD AUTO: 10.9 FL (ref 8.9–12.7)
POTASSIUM SERPL-SCNC: 3.3 MMOL/L (ref 3.5–5.3)
RBC # BLD AUTO: 3.69 MILLION/UL (ref 3.88–5.62)
RSV RNA RESP QL NAA+PROBE: NEGATIVE
SARS-COV-2 RNA RESP QL NAA+PROBE: NEGATIVE
SODIUM SERPL-SCNC: 140 MMOL/L (ref 136–145)
WBC # BLD AUTO: 9.39 THOUSAND/UL (ref 4.31–10.16)

## 2021-11-19 PROCEDURE — 90682 RIV4 VACC RECOMBINANT DNA IM: CPT | Performed by: INTERNAL MEDICINE

## 2021-11-19 PROCEDURE — 0241U HB NFCT DS VIR RESP RNA 4 TRGT: CPT | Performed by: FAMILY MEDICINE

## 2021-11-19 PROCEDURE — 80048 BASIC METABOLIC PNL TOTAL CA: CPT | Performed by: FAMILY MEDICINE

## 2021-11-19 PROCEDURE — 99239 HOSP IP/OBS DSCHRG MGMT >30: CPT | Performed by: FAMILY MEDICINE

## 2021-11-19 PROCEDURE — 94664 DEMO&/EVAL PT USE INHALER: CPT

## 2021-11-19 PROCEDURE — 94668 MNPJ CHEST WALL SBSQ: CPT

## 2021-11-19 PROCEDURE — 99232 SBSQ HOSP IP/OBS MODERATE 35: CPT | Performed by: INTERNAL MEDICINE

## 2021-11-19 PROCEDURE — 94760 N-INVAS EAR/PLS OXIMETRY 1: CPT

## 2021-11-19 PROCEDURE — G0008 ADMIN INFLUENZA VIRUS VAC: HCPCS | Performed by: INTERNAL MEDICINE

## 2021-11-19 PROCEDURE — 85025 COMPLETE CBC W/AUTO DIFF WBC: CPT | Performed by: FAMILY MEDICINE

## 2021-11-19 PROCEDURE — 94640 AIRWAY INHALATION TREATMENT: CPT

## 2021-11-19 PROCEDURE — 99232 SBSQ HOSP IP/OBS MODERATE 35: CPT | Performed by: PHYSICIAN ASSISTANT

## 2021-11-19 PROCEDURE — 87081 CULTURE SCREEN ONLY: CPT | Performed by: INTERNAL MEDICINE

## 2021-11-19 RX ADMIN — IPRATROPIUM BROMIDE 0.5 MG: 0.5 SOLUTION RESPIRATORY (INHALATION) at 15:49

## 2021-11-19 RX ADMIN — LEVALBUTEROL 1.25 MG: 1.25 SOLUTION, CONCENTRATE RESPIRATORY (INHALATION) at 15:49

## 2021-11-19 RX ADMIN — PANTOPRAZOLE SODIUM 40 MG: 40 TABLET, DELAYED RELEASE ORAL at 16:36

## 2021-11-19 RX ADMIN — DILTIAZEM HYDROCHLORIDE 120 MG: 60 CAPSULE, EXTENDED RELEASE ORAL at 09:56

## 2021-11-19 RX ADMIN — ASPIRIN 81 MG CHEWABLE TABLET 81 MG: 81 TABLET CHEWABLE at 02:37

## 2021-11-19 RX ADMIN — ALLOPURINOL 100 MG: 100 TABLET ORAL at 09:49

## 2021-11-19 RX ADMIN — INFLUENZA A VIRUS A/WISCONSIN/588/2019 (H1N1) RECOMBINANT HEMAGGLUTININ ANTIGEN, INFLUENZA A VIRUS A/TASMANIA/503/2020 (H3N2) RECOMBINANT HEMAGGLUTININ ANTIGEN, INFLUENZA B VIRUS B/WASHINGTON/02/2019 RECOMBINANT HEMAGGLUTININ ANTIGEN, AND INFLUENZA B VIRUS B/PHUKET/3073/2013 RECOMBINANT HEMAGGLUTININ ANTIGEN 0.5 ML: 45; 45; 45; 45 INJECTION INTRAMUSCULAR at 09:50

## 2021-11-19 RX ADMIN — IPRATROPIUM BROMIDE 0.5 MG: 0.5 SOLUTION RESPIRATORY (INHALATION) at 09:40

## 2021-11-19 RX ADMIN — GUAIFENESIN 600 MG: 600 TABLET ORAL at 09:49

## 2021-11-19 RX ADMIN — LEVALBUTEROL 1.25 MG: 1.25 SOLUTION, CONCENTRATE RESPIRATORY (INHALATION) at 09:40

## 2021-11-19 RX ADMIN — LEVETIRACETAM 1500 MG: 500 TABLET ORAL at 09:49

## 2021-11-19 RX ADMIN — APIXABAN 5 MG: 5 TABLET, FILM COATED ORAL at 09:49

## 2021-11-19 RX ADMIN — ATORVASTATIN CALCIUM 40 MG: 40 TABLET, FILM COATED ORAL at 16:36

## 2021-11-19 RX ADMIN — METOPROLOL SUCCINATE 100 MG: 100 TABLET, EXTENDED RELEASE ORAL at 09:56

## 2021-11-19 RX ADMIN — SERTRALINE 25 MG: 25 TABLET, FILM COATED ORAL at 16:37

## 2021-11-19 RX ADMIN — APIXABAN 5 MG: 5 TABLET, FILM COATED ORAL at 16:37

## 2021-11-19 RX ADMIN — PANTOPRAZOLE SODIUM 40 MG: 40 TABLET, DELAYED RELEASE ORAL at 05:49

## 2021-11-21 LAB
MRSA NOSE QL CULT: ABNORMAL
MRSA NOSE QL CULT: ABNORMAL

## 2021-11-22 ENCOUNTER — PATIENT OUTREACH (OUTPATIENT)
Dept: CASE MANAGEMENT | Facility: OTHER | Age: 61
End: 2021-11-22

## 2021-11-22 LAB
ATRIAL RATE: 68 BPM
P AXIS: 31 DEGREES
PR INTERVAL: 236 MS
QRS AXIS: 69 DEGREES
QRSD INTERVAL: 170 MS
QT INTERVAL: 466 MS
QTC INTERVAL: 495 MS
T WAVE AXIS: 45 DEGREES
VENTRICULAR RATE: 68 BPM

## 2021-11-22 PROCEDURE — 93010 ELECTROCARDIOGRAM REPORT: CPT | Performed by: INTERNAL MEDICINE

## 2021-11-23 LAB
BACTERIA BLD CULT: NORMAL
BACTERIA BLD CULT: NORMAL

## 2021-11-29 ENCOUNTER — PATIENT OUTREACH (OUTPATIENT)
Dept: CASE MANAGEMENT | Facility: OTHER | Age: 61
End: 2021-11-29

## 2021-11-30 ENCOUNTER — PATIENT OUTREACH (OUTPATIENT)
Dept: CASE MANAGEMENT | Facility: OTHER | Age: 61
End: 2021-11-30

## 2021-11-30 ENCOUNTER — EPISODE CHANGES (OUTPATIENT)
Dept: CASE MANAGEMENT | Facility: HOSPITAL | Age: 61
End: 2021-11-30

## 2022-01-01 NOTE — ASSESSMENT & PLAN NOTE
- Likely secondary to COVID-19 infection  - Patient initially intubated on 1/17 and was extubated on 1/18 maintained on BiPAP and HFNC  - However, reintubated on 2/1/2021 for airway protection following seizure activity  - Wean vent as able per critical care team 2 seconds or less

## 2023-07-17 NOTE — CASE MANAGEMENT
Call received from Nilesh Ibarra that she spoke with the the state and pt will be needing a new PASRR and MA-51 completed even though pt did not have a plan  Clinicals just need to be reviewed to make sure pt is nursing home eligible  CM completed new PASRR and MA-51 and faxed over with the clinicals to [phone]  Patient assisted to the restroom and back to stretcher without incident. Call bell within reach and no further needs at this time.

## 2023-09-14 NOTE — ASSESSMENT & PLAN NOTE
History of dysphagia with CT concerning aspiration  Modified diet instituted after speech evaluation Qbrexza Pregnancy And Lactation Text: There is no available data on Qbrexza use in pregnant women.  There is no available data on Qbrexza use in lactation.

## 2024-04-15 NOTE — OCCUPATIONAL THERAPY NOTE
[address] Progress Note     Patient Name: Brennon Baptiste  QRLWD'M Date: 3/7/2019  Problem List  Patient Active Problem List   Diagnosis    Aortic stenosis, mild    Essential hypertension    Hyperlipidemia    Left bundle branch block    Murmur    Osteoarthritis of both knees    Pericardial disease    Sciatica    Age-related cataract of right eye    Venous insufficiency    Bilateral leg edema    Stress-induced cardiomyopathy    Acute respiratory failure with hypoxia ([facility] [address] )    History of aortic valve replacement with bioprosthetic valve    Persistent atrial fibrillation (HCC)    MSSA bacteremia - Resolved septic shock    Thrombocytopenia (HCC)    Acute blood loss anemia - Gastric ulcer    Leukocytosis    Cerebrovascular accident ([address] )    Acute metabolic encephalopathy    Skin rash    Acute renal failure    Hypovolemic shock (HCC)    GI bleed    Coagulopathy (HCC)    GI bleeding    Age-related nuclear cataract, bilateral    Open angle with borderline findings, low risk, bilateral    Presence of intraocular lens    Vitreous degeneration of both eyes    Left arm swelling    Deep tissue injury    Dysphagia    Cellulitis/myositis of left forearm    Left internal jugular vein thrombus    Atrial flutter/fibrillation - Stress-induced cardiomyopathy     Morbid obesity            03/07/19 1345   Restrictions/Precautions   Weight Bearing Precautions Per Order Yes   LUE Weight Bearing Per Order Other  (in sling)   Other Precautions Cognitive;Multiple lines; Fall Risk;Pain   General   Response to Previous Treatment Patient with no complaints from previous session   Pain Assessment   Pain Assessment FLACC   Pain Rating: FLACC (Rest) - Face 0   Pain Rating: FLACC (Rest) - Legs 0   Pain Rating: FLACC (Rest) - Activity 0   Pain Rating: FLACC (Rest) - Cry 0   Pain Rating: FLACC (Rest) - Consolability 0   Score: FLACC (Rest) 0   Pain Rating: FLACC (Activity) - Face 1   Pain Rating: FLACC Patient needs to be seen by PCP before orders can be placed for home health care. Notified daughter and appointment was set up.   (Activity) - Legs 0   Pain Rating: FLACC (Activity) - Activity 0   Pain Rating: FLACC (Activity) - Cry 1   Pain Rating: FLACC (Activity) - Consolability 0   Score: FLACC (Activity) 2   ADL   Eating Assistance 4  Minimal Assistance   Eating Deficit Bringing food to mouth assist;Supervision/safety   Eating Comments seated EOB  modified diet  ocassional assist to bring food to mouth 2' spilling  performed w/ SLP Sara   Bed Mobility   Supine to Sit 3  Moderate assistance   Additional items Assist x 2;HOB elevated; Bedrails; Increased time required;LE management   Activity Tolerance   Activity Tolerance Patient limited by fatigue   Medical Staff Made Aware RN, SLP Sara   Assessment   Assessment Patient participated in Skilled OT session this date with interventions consisting of ADL re-training, bed mobility  Patient agreeable to OT treatment session, upon arrival patient was found supine in bed  Pt performed supine to sit w/ MOD A x2  Pt initially able to maintain sitting balance EOB with MIN-MOD A x1 during self-feeding task  Pt began sliding forward while seated EOB w/ writer and speech therapist Blessing Jaffe present  Restorative tech and PCA entered room as sliding began and assisted in lowering pt to floor  Pt assisted back to bed via [facility]  Nursing notified physician  Event report completed  Patient continues to be functioning below baseline level, occupational performance remains limited secondary to factors listed above and increased risk for falls and injury  From OT standpoint, recommendation at time of d/c would be Short Term Rehab  Patient to benefit from continued Occupational Therapy treatment while in the hospital to address deficits as defined above and maximize level of functional independence with ADLs and functional mobility      Plan   Treatment Interventions ADL retraining;Functional transfer training   Goal Expiration Date 03/13/19   Treatment Day 3   OT Frequency 3-5x/wk   Recommendation   OT Discharge Recommendation Short Term Rehab   OT - OK to Discharge Yes  (when medically appropriate)       Mirtha Carvalho, OT

## 2024-09-23 NOTE — PROGRESS NOTES
Vancomycin IV Pharmacy-to-Dose Consultation    Lay Catherine is a 63 y o  male who is currently receiving Vancomycin IV with management by the Pharmacy Consult service  Assessment/Plan:  The patient was reviewed  Renal function is fairly stable and no signs or symptoms of nephrotoxicity and/or infusion reactions were documented in the chart  Based on todays assessment, continue current vancomycin (day # 2) dosing of 2000 every 12 hours, with a plan for trough to be drawn at 0530 on 01/19/2021  We will continue to follow the patients culture results and clinical progress daily      Sb Acosta, Pharmacist Paranoid thought- people after me

## 2025-01-23 NOTE — QUICK NOTE
Informed mom to change PCP on insurance card.    Updated Narendra Pond spouse about patient's status and treatment plan including the bloody secretions from this morning and unchanged vent settings  Also let her know about adjustments to lasix and starting abx for procal increase  Answered questions to best of my ability  Spouse is appreciative of update

## 2025-06-23 NOTE — PROGRESS NOTES
Assessment   Assessed    1  Essential hypertension (401 9) (I10)   2  Aortic stenosis, mild (424 1) (I35 0)   3  Hyperlipidemia (272 4) (E78 5)   4  Left bundle-branch block, unspecified (426 3) (I44 7)   5  Bioprosthetic aortic valve replacement during current hospitalization (V42 2) (Z95 3)   6  S/P aortic valve replacement (V43 3) (Z95 2)    Plan   Aortic stenosis, mild, Bioprosthetic aortic valve replacement during current    hospitalization, Essential hypertension, Hyperlipidemia, Left bundle-branch block,    unspecified, S/P aortic valve replacement    · Follow-up visit in 6 months Evaluation and Treatment  Follow-up  Status: Hold For -    Scheduling  Requested for: [id number]   Ordered; For: Aortic stenosis, mild, Bioprosthetic aortic valve replacement during current hospitalization, Essential hypertension, Hyperlipidemia, Left bundle-branch block, unspecified, S/P aortic valve replacement; Ordered By: Ghislaine Clemens Performed:  Due: [id number]  Essential hypertension    · AmLODIPine Besylate 5 MG Oral Tablet; TAKE 1 TABLET DAILY   Rx By: Ghislaine Clemens; Dispense: 30 Days ; #:30 Tablet; Refill: 11; For: Essential hypertension; FILI = N; Verified Transmission to [email or web address][id number] Last Updated By: System, [employer]; 12/20/2017 2:19:08 PM  Hyperlipidemia    · Pravastatin Sodium 40 MG Oral Tablet; TAKE 1 TABLET DAILY   Rx By: Ghislaine Clemens; Dispense: 30 Days ; #:30 Tablet; Refill: 6;For: Hyperlipidemia; FILI = N; Verified Transmission to [email or web address] #[id number] Last Updated By: System, [employer]; 12/20/2017 2:19:07 PM    Discussion/Summary   Cardiology Discussion Summary Free Text Note Form [facility]:    Blood pressures we will high I've added Norvasc 5 mg daily  His valve appears unchanged in the recent echo  LDL cholesterol trending up increase pravastatin 40  Follow-up with him in 6 months        History of Present Illness   Cardiology HPI Free Text Note Form [facility]: Mr Alvarez Harris Is a pleasant 61 year-old gentleman with a history of severe aortic stenosis status post bioprosthetic aVR and he also had postop surgery Pericardial effusion and tamponade Physiology  He underwent a drainage procedure and had no recurrence  Over last 6 months he's been feeling quite well  Back to work his functional capacity has been very good  He denies any exertional chest pressure heaviness  He denies any palpitations, lightheadedness, dizziness, or syncope  He's been taking all medications as prescribed  He is gain some weight over the past 6 months but says that his eating habits have not and that good area he also does not get much in terms of regular exercise  Her recent echocardiogram showed no recurrent pericardial fluid  His bioprosthetic aVR showed a slight increase in the mean gradient 21 mmHg  He has not had any signs of decompensated heart failure  He denies any PND orthopnea  His functional capacity has been good and he still working 5 days a week  he's been doing well since her last office visit he continues to work several days a week with no significant physical limitations  He does have some arthritis in lower extremity joint complaints  Occasionally gets lower extremity edema but he says this is mild and well controlled  His aortic valve showed some increasing gradients in mild stenosis last year  He'll be due for yearly follow-up area he denies any chest pain, lightheadedness, venous, or syncope  There's been no PND orthopnea  He's been taking all medications as prescribed  presents today for routine scheduled follow-up visit  He's had some mild shortness of breath since her last office Appointment and is usually occurs with strenuous activity  He denies any chest pain or discomfort  Echocardiogram was reviewed and he denies any significant change in his chronic lower extremity edema, PND, orthopnea  He denies palpitations, lightheadedness, dizziness, or syncope   He's been taking all medications as prescribed and his diet has not been good  Review of Systems   Cardiology Male ROS:         Cardiac: No complaints of chest pain, no palpitations, no fainiting  Skin: No complaints of nonhealing sores or skin rash  Genitourinary: No complaints of recurrent urinary tract infections, frequent urination at night, difficult urination, blood in urine, kidney stones, loss of bladder control, no kidney or prostate problems, no erectile dysfunction  Psychological: No complaints of feeling depressed, anxiety, panic attacks, or difficulty concentrating  General: No complaints of trouble sleeping, lack of energy, fatigue, appetite changes, weight changes, fever, frequent infections, or night sweats  Respiratory: No complaints of shortness of breath, cough with sputum, or wheezing  HEENT: No complaints of serious problems, hearing problems, nose problems, throat problems, or snoring  Gastrointestinal: No complaints of liver problems, nausea, vomiting, heartburn, constipation, bloody stools, diarrhea, problems swallowing, adbominal pain, or rectal bleeding  Hematologic: No complaints of bleeding disorders, anemia, blood clots, or excessive brusing  Neurological: No complaints of numbness, tingling, dizziness, weakness, seizures, headaches, syncope or fainting, AM fatigue, daytime sleepiness, no witnessed apnea episodes  Musculoskeletal: No complaints of arthritis, back pain, or painfull swelling  Active Problems   Problems    1  Age-related cataract of left eye, unspecified age-related cataract type   2  Aortic stenosis, mild (424 1) (I35 0)   3  Bioprosthetic aortic valve replacement during current hospitalization (V42 2) (Z95 3)   4  Essential hypertension (401 9) (I10)   5  Hyperlipidemia (272 4) (E78 5)   6  Hypokalemia (276 8) (E87 6)   7  Increased BMI (783 9) (R63 8)   8  Left bundle-branch block, unspecified (426 3) (I44 7)   9  Murmur (785 2) (R01 1)   10  Osteoarthritis of both knees (715 96) (M17 0)   11  Pericardial disease (423 9) (I31 9)   12  S/P aortic valve replacement (V43 3) (Z95 2)   13  Sciatica (724 3) (M54 30)   14  Screening for depression (V79 0) (Z13 89)   15  Senile cataract of right eye, unspecified age-related cataract type (366 10) (H25 9)   16  Venous insufficiency (459 81) (I87 2)    Past Medical History   Problems    1  History of Acute bacterial conjunctivitis (372 03) (H10 30)   2  History of Acute sinusitis (461 9) (J01 90)   3  History of Allergic rhinitis (477 9) (J30 9)   4  History of Back Strain (847 9)   5  History of Cellulitis of leg (682 6) (L03 119)   6  History of Closed Fracture Of The Middle Phalanx Of The Right Fifth Finger (816 01)   7  History of Hemorrhoids (455 6) (K64 9)   8  History of acute sinusitis (V12 69) (Z87 09)   9  History of low back pain (V13 59) (Z87 39)   10  History of Influenza (487 1) (J11 1)   11  History of Internal hemorrhoid, bleeding (455 2) (K64 8)   12  History of Joint pain, knee (719 46) (M25 569)   13  History of Need for influenza vaccination (V04 81) (Z23)   14  History of Shortness of breath (786 05) (R06 02)  Active Problems And Past Medical History Reviewed: The active problems and past medical history were reviewed and updated today  Surgical History   Problems    1  History of Aortic Valve Replacement   2  History of Cardiac Cath Procedure Summary   3  History of Knee Surgery   4  History of Oral Surgery Tooth Extraction   5  History of Surgery Testis   6  History of Tonsillectomy With Adenoidectomy  Surgical History Reviewed: The surgical history was reviewed and updated today  Family History   Mother    1  Family history of Malignant neoplasm of lung, unspecified laterality   2  Family history of Pacemaker Placement  Father    3  Family history of coronary artery disease (V17 3) (Z82 49)   4  Family history of Hypertension (V17 49)   5   Family history of Prior Myocardial Infarction  Family History    6  Family history of malignant neoplasm of urinary bladder (V16 52) (Z80 52)  Family History Reviewed: The family history was reviewed and updated today  Social History   Problems    · Denied: History of Drug Use   · Marital History - Currently    · Never a smoker   · Never Drank Alcohol  Social History Reviewed: The social history was reviewed and updated today  Current Meds    1  [employer] Vitamin C 500 MG TABS; Take 1 tablet twice daily Recorded   2  Aspirin 325 MG Oral Tablet; TAKE 1 TABLET DAILY; Therapy: 21KSS2817 to (Evaluate:18Feb2015)  Requested for: 50Ixf3714; Last     Rx:[id number] Ordered   3  Claritin 10 MG Oral Tablet; TAKE 1 TABLET EVERY MORNING AS NEEDED Recorded   4  Fluticasone Propionate 50 MCG/ACT Nasal Suspension; USE 1 SPRAY IN EACH     NOSTRIL TWICE DAILY; Therapy: 54BAN7651 to (Last Rx:[id number])  Requested for: 13VAM5471 Ordered   5  Metoprolol Tartrate 100 MG Oral Tablet; Take 1 tablet twice daily; Therapy: 08OCR5676 to (Evaluate:19Jan2018)  Requested for: 99COJ0558; Last     Rx:[id number] Ordered   6  Potassium Chloride [place] ER 20 MEQ Oral Tablet Extended Release; Take 1 tablet daily; Therapy: 36XTW0645 to (Evaluate:80Ndt8475)  Requested for: 73XLC0840; Last     Rx:[id number] Ordered   7  Pravastatin Sodium 20 MG Oral Tablet; TAKE 1 TABLET Bedtime; Therapy: 88UVR4519 to (Evaluate:39Ese7689)  Requested for: 73Qtm9256; Last     Rx:[id number] Ordered   8  Torsemide 20 MG Oral Tablet; Take 1 tablet daily  Requested for: 85Jlz5819; Last     Rx:[id number] Ordered  Medication List Reviewed: The medication list was reviewed and updated today  Allergies   Medication    1  Penicillins    Physical Exam        Constitutional      General appearance: No acute distress, well appearing and well nourished  Eyes      Conjunctiva and Sclera examination: Conjunctiva pink, sclera anicteric         Ears, Nose, Mouth, and Throat - Oropharynx: Clear, nares are clear, mucous membranes are moist       Neck      Neck and thyroid: Normal, supple, trachea midline, no thyromegaly  Pulmonary      Respiratory effort: No increased work of breathing or signs of respiratory distress  Auscultation of lungs: Clear to auscultation, no rales, no rhonchi, no wheezing, good air movement  Cardiovascular      Auscultation of heart: Normal rate and rhythm, normal S1 and S2, no murmurs  Carotid pulses: Normal, 2+ bilaterally  Peripheral vascular exam: Normal pulses throughout, no tenderness, erythema or swelling  Pedal pulses: Normal, 2+ bilaterally  Examination of extremities for edema and/or varicosities: Abnormal   bilateral ankle trace+ pitting edema  Abdomen      Abdomen: Non-tender and no distention  Liver and spleen: No hepatomegaly or splenomegaly  Musculoskeletal Gait and station: Normal gait  -- Digits and nails: Normal without clubbing or cyanosis  -- Inspection/palpation of joints, bones, and muscles: Normal, ROM normal        Skin - Skin and subcutaneous tissue: Normal without rashes or lesions  Skin is warm and well perfused, normal turgor  Neurologic - Cranial nerves: II - XII intact  -- Speech: Normal        Psychiatric - Orientation to person, place, and time: Normal -- Mood and affect: Normal       Signatures    Electronically signed by : Latha Borrero DO; Dec 21 2017 12:16PM EST                       (Author) - LAD seen on chest CT c/f malignancy  - Planned for supraclavicular LN bx today with IR

## (undated) DEVICE — BIPOLAR ELECTROHEMOSTASIS CATHETER: Brand: GOLD PROBE 350CM

## (undated) DEVICE — FORCEP ENDO RESCUE RAT TOOTH 230CM

## (undated) DEVICE — WORKING LENGTH 235CM, WORKING CHANNEL 2.8MM: Brand: RESOLUTION 360 CLIP